# Patient Record
Sex: MALE | Race: BLACK OR AFRICAN AMERICAN | NOT HISPANIC OR LATINO
[De-identification: names, ages, dates, MRNs, and addresses within clinical notes are randomized per-mention and may not be internally consistent; named-entity substitution may affect disease eponyms.]

---

## 2021-10-08 ENCOUNTER — APPOINTMENT (OUTPATIENT)
Age: 61
End: 2021-10-08

## 2021-10-27 ENCOUNTER — INPATIENT (INPATIENT)
Facility: HOSPITAL | Age: 61
LOS: 6 days | Discharge: HOME | DRG: 194 | End: 2021-11-03
Attending: FAMILY MEDICINE | Admitting: INTERNAL MEDICINE
Payer: MEDICAID

## 2021-10-27 VITALS — WEIGHT: 188.05 LBS | HEIGHT: 72 IN

## 2021-10-27 DIAGNOSIS — I50.23 ACUTE ON CHRONIC SYSTOLIC (CONGESTIVE) HEART FAILURE: ICD-10-CM

## 2021-10-27 LAB
ALBUMIN SERPL ELPH-MCNC: 3.6 G/DL — SIGNIFICANT CHANGE UP (ref 3.3–5)
ALP SERPL-CCNC: 194 U/L — HIGH (ref 40–120)
ALT FLD-CCNC: 34 U/L — SIGNIFICANT CHANGE UP (ref 12–78)
AMPHET UR-MCNC: NEGATIVE — SIGNIFICANT CHANGE UP
ANION GAP SERPL CALC-SCNC: 3 MMOL/L — LOW (ref 5–17)
ANION GAP SERPL CALC-SCNC: 5 MMOL/L — SIGNIFICANT CHANGE UP (ref 5–17)
AST SERPL-CCNC: 39 U/L — HIGH (ref 15–37)
B PERT DNA SPEC QL NAA+PROBE: SIGNIFICANT CHANGE UP
BARBITURATES UR SCN-MCNC: NEGATIVE — SIGNIFICANT CHANGE UP
BASOPHILS # BLD AUTO: 0.04 K/UL — SIGNIFICANT CHANGE UP (ref 0–0.2)
BASOPHILS NFR BLD AUTO: 1.1 % — SIGNIFICANT CHANGE UP (ref 0–2)
BENZODIAZ UR-MCNC: NEGATIVE — SIGNIFICANT CHANGE UP
BILIRUB SERPL-MCNC: 1.3 MG/DL — HIGH (ref 0.2–1.2)
BUN SERPL-MCNC: 26 MG/DL — HIGH (ref 7–23)
BUN SERPL-MCNC: 30 MG/DL — HIGH (ref 7–23)
C PNEUM DNA SPEC QL NAA+PROBE: SIGNIFICANT CHANGE UP
CALCIUM SERPL-MCNC: 8.4 MG/DL — LOW (ref 8.5–10.1)
CALCIUM SERPL-MCNC: 8.7 MG/DL — SIGNIFICANT CHANGE UP (ref 8.5–10.1)
CHLORIDE SERPL-SCNC: 104 MMOL/L — SIGNIFICANT CHANGE UP (ref 96–108)
CHLORIDE SERPL-SCNC: 107 MMOL/L — SIGNIFICANT CHANGE UP (ref 96–108)
CO2 SERPL-SCNC: 29 MMOL/L — SIGNIFICANT CHANGE UP (ref 22–31)
CO2 SERPL-SCNC: 31 MMOL/L — SIGNIFICANT CHANGE UP (ref 22–31)
COCAINE METAB.OTHER UR-MCNC: NEGATIVE — SIGNIFICANT CHANGE UP
CREAT SERPL-MCNC: 1.31 MG/DL — HIGH (ref 0.5–1.3)
CREAT SERPL-MCNC: 1.32 MG/DL — HIGH (ref 0.5–1.3)
EOSINOPHIL # BLD AUTO: 0.06 K/UL — SIGNIFICANT CHANGE UP (ref 0–0.5)
EOSINOPHIL NFR BLD AUTO: 1.7 % — SIGNIFICANT CHANGE UP (ref 0–6)
FLUAV H1 2009 PAND RNA SPEC QL NAA+PROBE: SIGNIFICANT CHANGE UP
FLUAV H1 RNA SPEC QL NAA+PROBE: SIGNIFICANT CHANGE UP
FLUAV H3 RNA SPEC QL NAA+PROBE: SIGNIFICANT CHANGE UP
FLUAV SUBTYP SPEC NAA+PROBE: SIGNIFICANT CHANGE UP
FLUBV RNA SPEC QL NAA+PROBE: SIGNIFICANT CHANGE UP
GLUCOSE SERPL-MCNC: 105 MG/DL — HIGH (ref 70–99)
GLUCOSE SERPL-MCNC: 113 MG/DL — HIGH (ref 70–99)
HADV DNA SPEC QL NAA+PROBE: SIGNIFICANT CHANGE UP
HCOV PNL SPEC NAA+PROBE: SIGNIFICANT CHANGE UP
HCT VFR BLD CALC: 48.5 % — SIGNIFICANT CHANGE UP (ref 39–50)
HGB BLD-MCNC: 15.3 G/DL — SIGNIFICANT CHANGE UP (ref 13–17)
HMPV RNA SPEC QL NAA+PROBE: SIGNIFICANT CHANGE UP
HPIV1 RNA SPEC QL NAA+PROBE: SIGNIFICANT CHANGE UP
HPIV2 RNA SPEC QL NAA+PROBE: SIGNIFICANT CHANGE UP
HPIV3 RNA SPEC QL NAA+PROBE: SIGNIFICANT CHANGE UP
HPIV4 RNA SPEC QL NAA+PROBE: SIGNIFICANT CHANGE UP
IMM GRANULOCYTES NFR BLD AUTO: 0 % — SIGNIFICANT CHANGE UP (ref 0–1.5)
LYMPHOCYTES # BLD AUTO: 1.03 K/UL — SIGNIFICANT CHANGE UP (ref 1–3.3)
LYMPHOCYTES # BLD AUTO: 29.2 % — SIGNIFICANT CHANGE UP (ref 13–44)
MAGNESIUM SERPL-MCNC: 2.3 MG/DL — SIGNIFICANT CHANGE UP (ref 1.6–2.6)
MCHC RBC-ENTMCNC: 30.2 PG — SIGNIFICANT CHANGE UP (ref 27–34)
MCHC RBC-ENTMCNC: 31.5 GM/DL — LOW (ref 32–36)
MCV RBC AUTO: 95.8 FL — SIGNIFICANT CHANGE UP (ref 80–100)
METHADONE UR-MCNC: NEGATIVE — SIGNIFICANT CHANGE UP
MONOCYTES # BLD AUTO: 0.4 K/UL — SIGNIFICANT CHANGE UP (ref 0–0.9)
MONOCYTES NFR BLD AUTO: 11.3 % — SIGNIFICANT CHANGE UP (ref 2–14)
NEUTROPHILS # BLD AUTO: 2 K/UL — SIGNIFICANT CHANGE UP (ref 1.8–7.4)
NEUTROPHILS NFR BLD AUTO: 56.7 % — SIGNIFICANT CHANGE UP (ref 43–77)
NT-PROBNP SERPL-SCNC: HIGH PG/ML (ref 0–125)
OPIATES UR-MCNC: NEGATIVE — SIGNIFICANT CHANGE UP
PCP SPEC-MCNC: SIGNIFICANT CHANGE UP
PCP UR-MCNC: NEGATIVE — SIGNIFICANT CHANGE UP
PHOSPHATE SERPL-MCNC: 3.4 MG/DL — SIGNIFICANT CHANGE UP (ref 2.5–4.5)
PLATELET # BLD AUTO: 168 K/UL — SIGNIFICANT CHANGE UP (ref 150–400)
POTASSIUM SERPL-MCNC: 4.2 MMOL/L — SIGNIFICANT CHANGE UP (ref 3.5–5.3)
POTASSIUM SERPL-MCNC: 4.3 MMOL/L — SIGNIFICANT CHANGE UP (ref 3.5–5.3)
POTASSIUM SERPL-SCNC: 4.2 MMOL/L — SIGNIFICANT CHANGE UP (ref 3.5–5.3)
POTASSIUM SERPL-SCNC: 4.3 MMOL/L — SIGNIFICANT CHANGE UP (ref 3.5–5.3)
PROT SERPL-MCNC: 6.9 GM/DL — SIGNIFICANT CHANGE UP (ref 6–8.3)
RAPID RVP RESULT: SIGNIFICANT CHANGE UP
RBC # BLD: 5.06 M/UL — SIGNIFICANT CHANGE UP (ref 4.2–5.8)
RBC # FLD: 16.3 % — HIGH (ref 10.3–14.5)
RSV RNA SPEC QL NAA+PROBE: SIGNIFICANT CHANGE UP
RV+EV RNA SPEC QL NAA+PROBE: SIGNIFICANT CHANGE UP
SARS-COV-2 RNA SPEC QL NAA+PROBE: SIGNIFICANT CHANGE UP
SODIUM SERPL-SCNC: 138 MMOL/L — SIGNIFICANT CHANGE UP (ref 135–145)
SODIUM SERPL-SCNC: 141 MMOL/L — SIGNIFICANT CHANGE UP (ref 135–145)
THC UR QL: POSITIVE — SIGNIFICANT CHANGE UP
TROPONIN I, HIGH SENSITIVITY RESULT: 52.52 NG/L — SIGNIFICANT CHANGE UP
WBC # BLD: 3.53 K/UL — LOW (ref 3.8–10.5)
WBC # FLD AUTO: 3.53 K/UL — LOW (ref 3.8–10.5)

## 2021-10-27 PROCEDURE — 93005 ELECTROCARDIOGRAM TRACING: CPT

## 2021-10-27 PROCEDURE — 83735 ASSAY OF MAGNESIUM: CPT

## 2021-10-27 PROCEDURE — 83880 ASSAY OF NATRIURETIC PEPTIDE: CPT

## 2021-10-27 PROCEDURE — 80048 BASIC METABOLIC PNL TOTAL CA: CPT

## 2021-10-27 PROCEDURE — 86769 SARS-COV-2 COVID-19 ANTIBODY: CPT

## 2021-10-27 PROCEDURE — 80061 LIPID PANEL: CPT

## 2021-10-27 PROCEDURE — 80053 COMPREHEN METABOLIC PANEL: CPT

## 2021-10-27 PROCEDURE — 85027 COMPLETE CBC AUTOMATED: CPT

## 2021-10-27 PROCEDURE — U0003: CPT

## 2021-10-27 PROCEDURE — 84100 ASSAY OF PHOSPHORUS: CPT

## 2021-10-27 PROCEDURE — 99285 EMERGENCY DEPT VISIT HI MDM: CPT

## 2021-10-27 PROCEDURE — 99223 1ST HOSP IP/OBS HIGH 75: CPT

## 2021-10-27 PROCEDURE — U0005: CPT

## 2021-10-27 PROCEDURE — 93010 ELECTROCARDIOGRAM REPORT: CPT | Mod: 76

## 2021-10-27 PROCEDURE — 93306 TTE W/DOPPLER COMPLETE: CPT

## 2021-10-27 PROCEDURE — 80307 DRUG TEST PRSMV CHEM ANLYZR: CPT

## 2021-10-27 PROCEDURE — 36415 COLL VENOUS BLD VENIPUNCTURE: CPT

## 2021-10-27 PROCEDURE — 83036 HEMOGLOBIN GLYCOSYLATED A1C: CPT

## 2021-10-27 PROCEDURE — 71045 X-RAY EXAM CHEST 1 VIEW: CPT | Mod: 26

## 2021-10-27 RX ORDER — FUROSEMIDE 40 MG
40 TABLET ORAL DAILY
Refills: 0 | Status: DISCONTINUED | OUTPATIENT
Start: 2021-10-27 | End: 2021-10-28

## 2021-10-27 RX ORDER — CARVEDILOL PHOSPHATE 80 MG/1
3.12 CAPSULE, EXTENDED RELEASE ORAL EVERY 12 HOURS
Refills: 0 | Status: DISCONTINUED | OUTPATIENT
Start: 2021-10-27 | End: 2021-10-29

## 2021-10-27 RX ORDER — FUROSEMIDE 40 MG
80 TABLET ORAL ONCE
Refills: 0 | Status: COMPLETED | OUTPATIENT
Start: 2021-10-27 | End: 2021-10-27

## 2021-10-27 RX ORDER — HEPARIN SODIUM 5000 [USP'U]/ML
5000 INJECTION INTRAVENOUS; SUBCUTANEOUS EVERY 12 HOURS
Refills: 0 | Status: DISCONTINUED | OUTPATIENT
Start: 2021-10-27 | End: 2021-11-03

## 2021-10-27 RX ORDER — ASPIRIN/CALCIUM CARB/MAGNESIUM 324 MG
162 TABLET ORAL ONCE
Refills: 0 | Status: COMPLETED | OUTPATIENT
Start: 2021-10-27 | End: 2021-10-27

## 2021-10-27 RX ORDER — SENNA PLUS 8.6 MG/1
2 TABLET ORAL AT BEDTIME
Refills: 0 | Status: DISCONTINUED | OUTPATIENT
Start: 2021-10-27 | End: 2021-11-03

## 2021-10-27 RX ORDER — NICOTINE POLACRILEX 2 MG
1 GUM BUCCAL DAILY
Refills: 0 | Status: DISCONTINUED | OUTPATIENT
Start: 2021-10-27 | End: 2021-11-03

## 2021-10-27 RX ORDER — ASPIRIN/CALCIUM CARB/MAGNESIUM 324 MG
81 TABLET ORAL DAILY
Refills: 0 | Status: DISCONTINUED | OUTPATIENT
Start: 2021-10-27 | End: 2021-11-03

## 2021-10-27 RX ADMIN — Medication 1 PATCH: at 11:44

## 2021-10-27 RX ADMIN — CARVEDILOL PHOSPHATE 3.12 MILLIGRAM(S): 80 CAPSULE, EXTENDED RELEASE ORAL at 16:17

## 2021-10-27 RX ADMIN — Medication 162 MILLIGRAM(S): at 11:44

## 2021-10-27 RX ADMIN — Medication 80 MILLIGRAM(S): at 11:44

## 2021-10-27 NOTE — H&P ADULT - HISTORY OF PRESENT ILLNESS
62 y/o male with a PMHx of CAD, Nonischemic Cardiomyopathy - CHF (EF in 2016 15-20%), no stents per prior caths noted, HTN, MI presents to the ED c/o worsening SOB since yesterday. Pt unable to walk short distances without feeling SOB. Pt lives at the shelter where they have a high sodium diet. No other complaints at this time. 60 y/o male with a PMHx of CAD, Nonischemic Cardiomyopathy - CHF (EF in 2016 15-20%), no stents per prior caths noted, HTN, MI presents to the ED c/o worsening SOB. Pt unable to walk short distances without feeling SOB, +orthopnea, +productive cough. Pt lives at the shelter where they have a high sodium diet. No chest pain, palp, fever, Gi issues, urinary issues.  Cardio - [facility] Crissy Rosas

## 2021-10-27 NOTE — ED STATDOCS - NSICDXPASTMEDICALHX_GEN_ALL_CORE_FT
PAST MEDICAL HISTORY:  CAD (coronary artery disease)     Heart failure, systolic     Hypertension

## 2021-10-27 NOTE — ED ADULT NURSE REASSESSMENT NOTE - NS ED NURSE REASSESS COMMENT FT1
Pt had 1 episode of svt, hr into 160's. Pt endorsed chest discomfort at time. EKG performed which shows pt went back into normal sinus rhythm. Pt reports chest discomfort has subsided. Dr. Stevens, night time senior made aware. No additional orders at this time.

## 2021-10-27 NOTE — H&P ADULT - NSHPLABSRESULTS_GEN_ALL_CORE
LABS: All Labs Reviewed:                        15.3   3.53  )-----------( 168      ( 27 Oct 2021 11:07 )             48.5     10-27    141  |  107  |  26<H>  ----------------------------<  105<H>  4.3   |  29  |  1.31<H>    Ca    8.7      27 Oct 2021 11:07    TPro  6.9  /  Alb  3.6  /  TBili  1.3<H>  /  DBili  x   /  AST  39<H>  /  ALT  34  /  AlkPhos  194<H>  10-27    Serum Pro-Brain Natriuretic Peptide: 72174 pg/mL (10.27.21 @ 11:07)     RADIOLOGY:  CXR pending    EKG:    TELEMETRY REVIEW: LABS: All Labs Reviewed:                        15.3   3.53  )-----------( 168      ( 27 Oct 2021 11:07 )             48.5     10-27    141  |  107  |  26<H>  ----------------------------<  105<H>  4.3   |  29  |  1.31<H>    Ca    8.7      27 Oct 2021 11:07    TPro  6.9  /  Alb  3.6  /  TBili  1.3<H>  /  DBili  x   /  AST  39<H>  /  ALT  34  /  AlkPhos  194<H>  10-27    Serum Pro-Brain Natriuretic Peptide: 56751 pg/mL (10.27.21 @ 11:07)     RADIOLOGY:  CXR pending    EKG: N/A, will order for AM    TELEMETRY REVIEW: NSR, no events

## 2021-10-27 NOTE — ED PROVIDER NOTE - CHPI ED SYMPTOMS POS
----- Message from Renny Bear MD sent at 1/18/2017  1:01 PM CST -----  Looks ok so far  
Patient called in regards to the messages that were submitted over the weekend.  Patient feels that he needs to have a catheter put in due to issues urinating.  Patient would like to have Bernie, to have a catheter put in.    Please contact the patient in regards to this.    Patient stated that he was going to be headed the the Emergency Room due to unable to urinate & constipation.    Patient would prefer to have Dr Medrano nurse put in the catheter.      
Pt notified of final ua results and recommendations, to finish antibx from [facility], then start cipro as prescribed prior to cysto, bx. Pt aware.  
Spoke with pt- dawna to come in for catheter placement, \"unable to go\". Will make MD aware.  
SHORTNESS OF BREATH

## 2021-10-27 NOTE — H&P ADULT - NSHPPHYSICALEXAM_GEN_ALL_CORE
PHYSICAL EXAM:  Constitutional: NAD, awake and alert  HEENT: PERR, EOMI, Normal Hearing, MMM  Neck: Soft and supple, No LAD, No JVD  Respiratory: Breath sounds are clear bilaterally, No wheezing, rales or rhonchi  Cardiovascular: S1 and S2, regular rate and rhythm, no Murmurs, gallops or rubs  Gastrointestinal: Bowel Sounds present, soft, nontender, nondistended, no guarding, no rebound  Extremities: No peripheral edema  Vascular: 2+ peripheral pulses  Neurological: A/O x 3, no focal deficits  Musculoskeletal: 5/5 strength b/l upper and lower extremities  Skin: No rashes    Vital Signs Last 24 Hrs  T(C): --  T(F): --  HR: 112 (27 Oct 2021 10:36) (112 - 112)  BP: 118/87 (27 Oct 2021 10:36) (118/87 - 118/87)  BP(mean): 97 (27 Oct 2021 10:36) (97 - 97)  RR: 18 (27 Oct 2021 10:36) (18 - 18)  SpO2: 100% (27 Oct 2021 10:36) (100% - 100%) PHYSICAL EXAM:  Constitutional: Awake and alert  HEENT: PERR, EOMI, Normal Hearing, MMM  Neck: Soft and supple, No LAD, No JVD  Respiratory: Breath sounds are slight crackles b/l  Cardiovascular: S1 and S2, regular rate and rhythm, no Murmurs, gallops or rubs. ICD  Gastrointestinal: Bowel Sounds present, soft, nontender, nondistended, no guarding, no rebound  Extremities: No peripheral edema  Vascular: 2+ peripheral pulses  Neurological: A/O x 3, no focal deficits  Musculoskeletal: 5/5 strength b/l upper and lower extremities  Skin: No rashes    Vital Signs Last 24 Hrs  T(C): --  T(F): --  HR: 112 (27 Oct 2021 10:36) (112 - 112)  BP: 118/87 (27 Oct 2021 10:36) (118/87 - 118/87)  BP(mean): 97 (27 Oct 2021 10:36) (97 - 97)  RR: 18 (27 Oct 2021 10:36) (18 - 18)  SpO2: 100% (27 Oct 2021 10:36) (100% - 100%) ROOM AIR

## 2021-10-27 NOTE — ED ADULT NURSE REASSESSMENT NOTE - NS ED NURSE REASSESS COMMENT FT1
Pt with 4 min run of SVT, pt broke on his own.  Currently NSR in the 90's.  Pt with no complaints at this time.  Repeat EKG completed.  Night Senior Rodney made aware.  No new orders at this time. Cyclophosphamide Pregnancy And Lactation Text: This medication is Pregnancy Category D and it isn't considered safe during pregnancy. This medication is excreted in breast milk.

## 2021-10-27 NOTE — ED PROVIDER NOTE - PROGRESS NOTE DETAILS
Saran Hernandez for attending Dr. Aburto: Rectal Exam: Lot [id number]. Expiration 2/28/22. Brown stool. Guaiac negative. QC passed. Criselda DURAN: patient to be admitted for chf; endorsed to Dr. Winters

## 2021-10-27 NOTE — ED PROVIDER NOTE - OBJECTIVE STATEMENT
62 y/o male with a PMHx of CAD, CHF, heart failure, HTN, MI  presents to the ED c/o worsening SOB since yesterday. Pt unable to walk short distances without feeling SOB. Pt lives at the shelter where they have a high sodium diet. No other complaints at this time.

## 2021-10-27 NOTE — ED ADULT NURSE REASSESSMENT NOTE - NS ED NURSE REASSESS COMMENT FT1
Received pt from prior RN. Pt resting comfortably. Pt's lungs diminished at bases but sat on RA 95%. Pt has been voiding large amts after lasix, 1000ml emptied from urinal. Pt resp unlabored. Monitored brandy. Call bell in reach.

## 2021-10-27 NOTE — ED STATDOCS - PROGRESS NOTE DETAILS
Saran MARTINEZ for Dr. Odell   60 y/o male with PMHx of HTN, CAD, CHF, presents to the ED c/o LE swelling, increased SOB, increased abdominal distension. Pt lives at a shelter. Received first Moderna vaccine 2 weeks. has been having diarrhea x2 days and worsening cough. No fever. Sat low 90s, Will send pt to main ED for further evaluation.

## 2021-10-27 NOTE — H&P ADULT - NSICDXPASTMEDICALHX_GEN_ALL_CORE_FT
PAST MEDICAL HISTORY:  CAD (coronary artery disease)     Heart failure, systolic     Hypertension     Nonischemic cardiomyopathy

## 2021-10-27 NOTE — H&P ADULT - NSHPSOCIALHISTORY_GEN_ALL_CORE
Living Situation: Shelter  Tobacco Use  Alcohol Use  Drug Use    COVID VACCINE > Living Situation: Shelter in [place]  Tobacco Use: Cigarettes, 3 per days x 35 years  Alcohol Use: occasionally  Drug Use: THC occasionally    COVID VACCINE > Moderna x1, will get second dose in 2 weeks

## 2021-10-27 NOTE — ED ADULT NURSE NOTE - PRIMARY CARE PROVIDER
Strong peripheral pulses/Capillary refill less/equal to 2 seconds/No visible significant external bleeding
see md note

## 2021-10-27 NOTE — H&P ADULT - ASSESSMENT
60 y/o male with a PMHx of CAD, Nonischemic Cardiomyopathy - CHF (EF in 2016 15-20%), no stents per prior caths noted, HTN, MI presents to the ED c/o worsening SOB. Admitted for:    #Acute hypoxic respiratory failure due to acute on chronic HFrEF exacerbation  #Hx Nonischemic Cardiomyopathy  Last known EF from 2016 (25-20%)  Supplemental 02 - wean as tolerated  Cont. IV Lasix - on PO torsemide outpatient  Cont. BB  Hold ARB for now given elevated scr  Daily weights, low sodium diet. FR 1500mL  F/u repeat echo  Nutrition eval for HF education  Cardio consult     #HTN  Cont. BB  Hold ARB for now given elevated scr    #DVT ppx  SQ Heparin    Dispo: Monitor on tele. Cardio eval. IV diuresis for HF exacerbation   60 y/o male with a PMHx of CAD, Nonischemic Cardiomyopathy - CHF (EF in 2016 15-20%), no stents per prior caths noted, HTN, MI presents to the ED c/o worsening SOB. Admitted for:    #Acute hypoxic respiratory failure due to acute on chronic HFrEF exacerbation  #Hx Nonischemic Cardiomyopathy  Last known EF from 2016 (25-20%)  Supplemental 02 - wean as tolerated  Cont. IV Lasix - on PO torsemide outpatient  Cont. BB  Hold ARB for now given elevated scr  Daily weights, low sodium diet. FR 1500mL  F/u repeat echo  Nutrition eval for HF education  Cardio consult: Lance    #HTN  Cont. BB  Hold ARB for now given elevated scr    #DVT ppx  SQ Heparin    GOC/ ADVANCED DIRECTIVES: DNR/DNI, MOLST    Dispo: Monitor on tele. Cardio eval. IV diuresis for HF exacerbation   60 y/o male with a PMHx of CAD, Nonischemic Cardiomyopathy - CHF (EF in 2016 15-20%), no stents per prior caths noted, HTN, MI presents to the ED c/o worsening SOB. Admitted for:    #Acute hypoxic respiratory failure due to acute on chronic HFrEF exacerbation  #Hx Nonischemic Cardiomyopathy  Last known EF from 2016 (25-20%)  Supplemental 02 - wean as tolerated  Cont. IV Lasix - on PO torsemide outpatient  Cont. BB  Hold ARB for now given elevated scr  Daily weights, low sodium diet. FR 1500mL  F/u repeat echo  Nutrition eval for HF education  Cardio consult: Lance    #HTN  Cont. BB  Hold ARB for now given elevated scr    #Nicotine Dependence  Cessation encouraged, nicotine patch     #DVT ppx  SQ Heparin    GOC/ ADVANCED DIRECTIVES: DNR/DNI, MOLST    Dispo: Monitor on tele. Cardio eval. IV diuresis for HF exacerbation   62 y/o male with a PMHx of CAD, Nonischemic Cardiomyopathy - CHF (EF in 2016 15-20%), no stents per prior caths noted, HTN, MI presents to the ED c/o worsening SOB. Admitted for:    #Acute hypoxic respiratory failure due to acute on chronic HFrEF exacerbation  #Hx Nonischemic Cardiomyopathy  Last known EF from 2016 (25-20%)  Supplemental 02 - wean as tolerated  Cont. IV Lasix - on PO torsemide outpatient  Cont. BB  Hold ARB for now given elevated scr  Daily weights, low sodium diet. FR 1500mL  F/u repeat echo  Nutrition eval for HF education  Cardio consult: Lance    #HTN  Cont. BB  Hold ARB for now given elevated scr    #Nicotine Dependence  Cessation encouraged, nicotine patch   F/U UTOX    #DVT ppx  SQ Heparin    GOC/ ADVANCED DIRECTIVES: DNR/DNI, MOLST    Dispo: Monitor on tele. Cardio eval. IV diuresis for HF exacerbation   60 y/o male with a PMHx of CAD, Nonischemic Cardiomyopathy - CHF (EF in 2016 15-20%), no stents per prior caths noted, HTN, MI presents to the ED c/o worsening SOB. Admitted for:    #Acute hypoxic respiratory failure due to acute on chronic HFrEF exacerbation  #Hx Nonischemic Cardiomyopathy  Last known EF from 2016 (25-20%)  Supplemental 02 - wean as tolerated  Cont. IV Lasix - on PO torsemide outpatient  Cont. BB  Hold ARB for now given elevated scr  Daily weights, low sodium diet. FR 1500mL  F/u repeat echo  Nutrition eval for HF education  Cardio consult: Lance    #DERRICK- potentially cardiorenal   -trend Cr w diuresis  -hold ARB    #HTN  Cont. BB  Hold ARB for now given elevated scr    #Nicotine Dependence  Cessation encouraged, nicotine patch   F/U UTOX    #DVT ppx  SQ Heparin    GOC/ ADVANCED DIRECTIVES: DNR/DNI, MOLST    Dispo: Monitor on tele. Cardio eval. IV diuresis for HF exacerbation

## 2021-10-28 PROBLEM — Z00.00 ENCOUNTER FOR PREVENTIVE HEALTH EXAMINATION: Status: ACTIVE | Noted: 2021-10-28

## 2021-10-28 LAB
A1C WITH ESTIMATED AVERAGE GLUCOSE RESULT: 6.2 % — HIGH (ref 4–5.6)
ANION GAP SERPL CALC-SCNC: 5 MMOL/L — SIGNIFICANT CHANGE UP (ref 5–17)
BUN SERPL-MCNC: 28 MG/DL — HIGH (ref 7–23)
CALCIUM SERPL-MCNC: 8.4 MG/DL — LOW (ref 8.5–10.1)
CHLORIDE SERPL-SCNC: 107 MMOL/L — SIGNIFICANT CHANGE UP (ref 96–108)
CHOLEST SERPL-MCNC: 104 MG/DL — SIGNIFICANT CHANGE UP
CO2 SERPL-SCNC: 28 MMOL/L — SIGNIFICANT CHANGE UP (ref 22–31)
COVID-19 SPIKE DOMAIN AB INTERP: POSITIVE
COVID-19 SPIKE DOMAIN ANTIBODY RESULT: 2.49 U/ML — HIGH
CREAT SERPL-MCNC: 1.2 MG/DL — SIGNIFICANT CHANGE UP (ref 0.5–1.3)
ESTIMATED AVERAGE GLUCOSE: 131 MG/DL — HIGH (ref 68–114)
GLUCOSE SERPL-MCNC: 90 MG/DL — SIGNIFICANT CHANGE UP (ref 70–99)
HCT VFR BLD CALC: 43.4 % — SIGNIFICANT CHANGE UP (ref 39–50)
HDLC SERPL-MCNC: 23 MG/DL — LOW
HGB BLD-MCNC: 14.1 G/DL — SIGNIFICANT CHANGE UP (ref 13–17)
LIPID PNL WITH DIRECT LDL SERPL: 71 MG/DL — SIGNIFICANT CHANGE UP
MAGNESIUM SERPL-MCNC: 2.3 MG/DL — SIGNIFICANT CHANGE UP (ref 1.6–2.6)
MCHC RBC-ENTMCNC: 30.3 PG — SIGNIFICANT CHANGE UP (ref 27–34)
MCHC RBC-ENTMCNC: 32.5 GM/DL — SIGNIFICANT CHANGE UP (ref 32–36)
MCV RBC AUTO: 93.3 FL — SIGNIFICANT CHANGE UP (ref 80–100)
NON HDL CHOLESTEROL: 82 MG/DL — SIGNIFICANT CHANGE UP
PLATELET # BLD AUTO: 148 K/UL — LOW (ref 150–400)
POTASSIUM SERPL-MCNC: 4.3 MMOL/L — SIGNIFICANT CHANGE UP (ref 3.5–5.3)
POTASSIUM SERPL-SCNC: 4.3 MMOL/L — SIGNIFICANT CHANGE UP (ref 3.5–5.3)
RBC # BLD: 4.65 M/UL — SIGNIFICANT CHANGE UP (ref 4.2–5.8)
RBC # FLD: 16 % — HIGH (ref 10.3–14.5)
SARS-COV-2 IGG+IGM SERPL QL IA: 2.49 U/ML — HIGH
SARS-COV-2 IGG+IGM SERPL QL IA: POSITIVE
SODIUM SERPL-SCNC: 140 MMOL/L — SIGNIFICANT CHANGE UP (ref 135–145)
TRIGL SERPL-MCNC: 55 MG/DL — SIGNIFICANT CHANGE UP
WBC # BLD: 3.78 K/UL — LOW (ref 3.8–10.5)
WBC # FLD AUTO: 3.78 K/UL — LOW (ref 3.8–10.5)

## 2021-10-28 PROCEDURE — 93010 ELECTROCARDIOGRAM REPORT: CPT

## 2021-10-28 PROCEDURE — 99233 SBSQ HOSP IP/OBS HIGH 50: CPT

## 2021-10-28 PROCEDURE — 93306 TTE W/DOPPLER COMPLETE: CPT | Mod: 26

## 2021-10-28 RX ORDER — LANOLIN ALCOHOL/MO/W.PET/CERES
5 CREAM (GRAM) TOPICAL AT BEDTIME
Refills: 0 | Status: DISCONTINUED | OUTPATIENT
Start: 2021-10-28 | End: 2021-11-03

## 2021-10-28 RX ORDER — FUROSEMIDE 40 MG
40 TABLET ORAL
Refills: 0 | Status: DISCONTINUED | OUTPATIENT
Start: 2021-10-28 | End: 2021-10-31

## 2021-10-28 RX ORDER — LOSARTAN POTASSIUM 100 MG/1
25 TABLET, FILM COATED ORAL DAILY
Refills: 0 | Status: DISCONTINUED | OUTPATIENT
Start: 2021-10-28 | End: 2021-10-28

## 2021-10-28 RX ORDER — ATORVASTATIN CALCIUM 80 MG/1
40 TABLET, FILM COATED ORAL AT BEDTIME
Refills: 0 | Status: DISCONTINUED | OUTPATIENT
Start: 2021-10-28 | End: 2021-11-03

## 2021-10-28 RX ORDER — SACUBITRIL AND VALSARTAN 24; 26 MG/1; MG/1
1 TABLET, FILM COATED ORAL
Refills: 0 | Status: DISCONTINUED | OUTPATIENT
Start: 2021-10-29 | End: 2021-11-03

## 2021-10-28 RX ORDER — LANOLIN ALCOHOL/MO/W.PET/CERES
5 CREAM (GRAM) TOPICAL AT BEDTIME
Refills: 0 | Status: COMPLETED | OUTPATIENT
Start: 2021-10-28 | End: 2021-10-28

## 2021-10-28 RX ORDER — SACUBITRIL AND VALSARTAN 24; 26 MG/1; MG/1
1 TABLET, FILM COATED ORAL
Qty: 60 | Refills: 0
Start: 2021-10-28 | End: 2021-11-26

## 2021-10-28 RX ADMIN — LOSARTAN POTASSIUM 25 MILLIGRAM(S): 100 TABLET, FILM COATED ORAL at 10:51

## 2021-10-28 RX ADMIN — Medication 1 PATCH: at 07:00

## 2021-10-28 RX ADMIN — Medication 81 MILLIGRAM(S): at 10:50

## 2021-10-28 RX ADMIN — CARVEDILOL PHOSPHATE 3.12 MILLIGRAM(S): 80 CAPSULE, EXTENDED RELEASE ORAL at 05:40

## 2021-10-28 RX ADMIN — ATORVASTATIN CALCIUM 40 MILLIGRAM(S): 80 TABLET, FILM COATED ORAL at 21:04

## 2021-10-28 RX ADMIN — Medication 40 MILLIGRAM(S): at 10:50

## 2021-10-28 RX ADMIN — Medication 5 MILLIGRAM(S): at 01:25

## 2021-10-28 RX ADMIN — CARVEDILOL PHOSPHATE 3.12 MILLIGRAM(S): 80 CAPSULE, EXTENDED RELEASE ORAL at 17:52

## 2021-10-28 NOTE — PHARMACOTHERAPY INTERVENTION NOTE - COMMENTS
Medication history complete, [facility]
As per [facility] copay for one month supply of Entresto is $3

## 2021-10-28 NOTE — DIETITIAN INITIAL EVALUATION ADULT. - MALNUTRITION
Meets criteria for mild protein-calorie malnutrition in the context of social and environmental circumstance.

## 2021-10-28 NOTE — DIETITIAN INITIAL EVALUATION ADULT. - PERTINENT MEDS FT
MEDICATIONS  (STANDING):  aspirin enteric coated 81 milliGRAM(s) Oral daily  carvedilol 3.125 milliGRAM(s) Oral every 12 hours  furosemide   Injectable 40 milliGRAM(s) IV Push daily  heparin   Injectable 5000 Unit(s) SubCutaneous every 12 hours  nicotine - 21 mG/24Hr(s) Patch 1 patch Transdermal daily    MEDICATIONS  (PRN):  senna 2 Tablet(s) Oral at bedtime PRN Constipation

## 2021-10-28 NOTE — PROGRESS NOTE ADULT - ASSESSMENT
61M hx  CAD, prior MI, NICM w HFrEF (15%), s/p AICD, HTN, p/w worsening SOB, found to have acute on chronic HFrEF in setting of dietary indiscretion.    #Acute on chronic HFrEF, hx NICM  -reports diet at shelter is high in salt, pt has limited options for fruit/veg. Alot of cold cut sandwiches  -tele  -on RA at rest, should ambulate for pulsox on RA  -trend daily weight, i/o  -BNP 15K on admission, repeat tmrw  -lipids checked  -A1c 6.2 (pre DM)  -CXR and echo as above, should review echo w cards  -AICD interrogation as above  -on torsemide 20BID at home, will give lasix 40iv BID here  -continue home BB, coreg  -will change losartan to entresto to optimize CHF management, cost run $3    #DERRICK   -Cr improving, ok to resume Losartan today and will change to Entresto tmrw    #thrombocytopenia- borderline, trend    #CAD, hx MI, HTN  -continue BB  -start entresto  -continue ASA  -start statin    #nicotine dependence  -refusing patch    #DVT ppx- SQH    #DNR/DNI, MOSLT done    #from shelter, return after adequate diuresis

## 2021-10-28 NOTE — DIETITIAN NUTRITION RISK NOTIFICATION - TREATMENT: THE FOLLOWING DIET HAS BEEN RECOMMENDED
Diet, DASH/TLC:   Sodium & Cholesterol Restricted  1500mL Fluid Restriction ([id number])  Supplement Feeding Modality:  Oral  Ensure Enlive Cans or Servings Per Day:  1       Frequency:  Two Times a day (10-27-21 @ 14:47) [Active]

## 2021-10-28 NOTE — PROGRESS NOTE ADULT - SUBJECTIVE AND OBJECTIVE BOX
HOSPITALIST ATTENDING PROGRESS NOTE    Chart and meds reviewed.  Patient seen and examined.    CC: SOB    Subjective: Reports urinated alot yesterday but not as much today. Reports breathing improving, reports hard to maintain low salt diet at shelter, never being given a choice of fruit or veg    All other systems reviewed and found to be negative with the exception of what has been described above.    MEDICATIONS  (STANDING):  aspirin enteric coated 81 milliGRAM(s) Oral daily  atorvastatin 40 milliGRAM(s) Oral at bedtime  carvedilol 3.125 milliGRAM(s) Oral every 12 hours  furosemide   Injectable 40 milliGRAM(s) IV Push two times a day  heparin   Injectable 5000 Unit(s) SubCutaneous every 12 hours  nicotine - 21 mG/24Hr(s) Patch 1 patch Transdermal daily    MEDICATIONS  (PRN):  melatonin 5 milliGRAM(s) Oral at bedtime PRN Insomnia  senna 2 Tablet(s) Oral at bedtime PRN Constipation      VITALS:  T(F): 99.1 (10-28-21 @ 19:52), Max: 99.1 (10-28-21 @ 19:52)  HR: 85 (10-28-21 @ 19:52) (85 - 97)  BP: 111/73 (10-28-21 @ 19:52) (111/73 - 115/85)  RR: 18 (10-28-21 @ 19:52) (17 - 18)  SpO2: 97% (10-28-21 @ 19:52) (91% - 97%)  Wt(kg): --    I&O's Summary    28 Oct 2021 07:01  -  28 Oct 2021 20:59  --------------------------------------------------------  IN: 924 mL / OUT: 500 mL / NET: 424 mL        CAPILLARY BLOOD GLUCOSE          PHYSICAL EXAM:  Gen: NAD  HEENT:  pupils equal and reactive, EOMI, no oropharyngeal lesions, erythema, exudates, oral thrush  NECK:   supple, no carotid bruits, no palpable lymph nodes, no thyromegaly  CV:  +S1, +S2, regular, no murmurs or rubs  RESP:   lungs clear to auscultation bilaterally, no wheezing, rales, rhonchi, good air entry bilaterally, + bibasilar crackles  BREAST:  not examined  GI:  abdomen soft, non-tender, non-distended, normal BS, no bruits, no abdominal masses, no palpable masses  RECTAL:  not examined  :  not examined  MSK:   normal muscle tone, no atrophy, no rigidity, no contractions  EXT:  no clubbing, no cyanosis, no edema, no calf pain, swelling or erythema  VASCULAR:  pulses equal and symmetric in the upper and lower extremities  NEURO:  AAOX3, no focal neurological deficits, follows all commands, able to move extremities spontaneously  SKIN:  no ulcers, lesions or rashes    LABS:                            14.1   3.78  )-----------( 148      ( 28 Oct 2021 06:58 )             43.4     10-28    140  |  107  |  28<H>  ----------------------------<  90  4.3   |  28  |  1.20    Ca    8.4<L>      28 Oct 2021 06:58  Phos  3.4     10-27  Mg     2.3     10-28    TPro  6.9  /  Alb  3.6  /  TBili  1.3<H>  /  DBili  x   /  AST  39<H>  /  ALT  34  /  AlkPhos  194<H>  10-27        LIVER FUNCTIONS - ( 27 Oct 2021 11:07 )  Alb: 3.6 g/dL / Pro: 6.9 gm/dL / ALK PHOS: 194 U/L / ALT: 34 U/L / AST: 39 U/L / GGT: x           CULTURES:  no new    Additional results/Imaging, I have personally reviewed:  CXR 10/27/21: Heart enlargement defibrillator. Right base pleuropulmonary reaction.    Echo 10/28/21: The left ventricle cavity is severely dilated. Left ventricle systolic function appears severely and globally impaired; segmental wall motion abnormalities noted. Estimated EjectionFraction is 16 %. The left atrium is mildly dilated. The right atrium appears moderately dilated. A device wire is seen in the RV and RA. Normal appearing right ventricle structure and function. A device wire is seen in the RV and RA. Fibrocalcific changes noted to the Aortic valve leaflets with preserved leaflet excursion. Fibrocalcific changes noted to the mitral valve leaflets with preserved leaflet excursion. EA reversal of the mitral inflow consistent with reduced compliance of the left ventricle. Moderate to severe (3+) mitral regurgitation is present. Normal appearing tricuspid valve structure. Moderate to severe (3+) tricuspid valve regurgitation is present. Moderate pulmonary hypertension. No evidence of pericardial effusion. IVC is dilated and not collapsing with inspiration.    AICD interrogation 10/28/21:  Episodes: episodes of NSVT, most recent on 10/19/21, no ICD therapies delivered.   Changes made: None  Conclusion: Normal device function.    Telemetry, personally reviewed:  10/28/21: sinus 90-110s, APCs

## 2021-10-28 NOTE — DIETITIAN INITIAL EVALUATION ADULT. - OTHER INFO
60 y/o male with a PMHx of CAD, Nonischemic Cardiomyopathy - CHF (EF in 2016 15-20%), no stents per prior caths noted, HTN, MI presents to the ED c/o worsening SOB. Pt unable to walk short distances without feeling SOB, +orthopnea, +productive cough. Pt lives at the shelter where they have a high sodium diet. No chest pain, palp, fever, Gi issues, urinary issues.  Cardio - [facility] [place]     Pt seen for assessment and education. Pt in difficult situation 2/2 to living in shelter and unable to get food that meets therapeutic diet needs. Pt states a lot of the food at the shelter is high in sodium. Pt has no cold storage and only a microwave. Pt overall knows he needs to avoid high sodium foods, but is often provided with deli meats. Pt also noted as thin and shows some mild signs of muscle and fat wasting. Pt with possible weight loss as pt states he avoids a lot of food and tries to be mindful about fluid intake. Pt denies c/s difficulty and denied n/v/d/c. Pt does not eat pork, no food allergies. Pt provided support for challenging social/economic situation. Recommend Ensure Enlive Daily to help meet protein needs. Will continue to monitor pt

## 2021-10-28 NOTE — CONSULT NOTE ADULT - ASSESSMENT
62 y/o male with a PMHx of CAD, Nonischemic Cardiomyopathy - CHF (EF in 2016 15-20%) s/p ICD (reports placed about 5 months ago by Dr. Shaikh), nonobstructive CAD, HTN presents to the ED c/o worsening SOB 2/2 acute on chronic systolic congestive heart failure exacerbation 2/2 dietary noncompliance    -Patient struggles with dietary compliance since at his shelter they only service high salt foods such as deli meats. ?if able to get him low salt foods, may need  to help get him a better diet so he does not get re-admitted due to his diet.  -Symptoms improving but still hypervolemic.  -At home on torsemide 20mg BID which is equivalent to about lasix PO 40mg BID or lasix 40 IV Qday, therefore not on higher dose than at home. Would monitor strict I/O and daily weights with goal to be net negative 1.5-2 liters in the next 24 hours, will likely need BID dosing of his lasix if he is not meeting this goal.  -On losartan, ?if he is able to afford entresto. If he is would switch him to entresto 24-26 BID, which is a better HF medications  -C/W Coreg, currently tolerating. Will hold off on increasing during his acute CHF exacerbation.

## 2021-10-28 NOTE — CHART NOTE - NSCHARTNOTEFT_GEN_A_CORE
ICD interrogation performed by [employer] rep.     Device implanted 6/9/21    Device :    [employer] - Acticor 7 VR single chamber ICD      Programmed VDI 40         Battery status: beginning of service                       Voltage: 3.11 V                               Underlying rhythm: sinus rhythm    Mode:  VDI    Lower rate: 40    Tachy Settings:  VT1 (bpm) 182bpm  VF (bpm): 214bpm                 Leads:    RV    Sensing: P-wave  2mV, R-wave 6.0mV    Impedance:  500ohms    Threshold:  0.5V @ 0.4ms    Shock Impedance 61ohms       Pulse amplitude 3.5V @0.4ms               Episodes: episodes of NSVT, most recent on 10/19/21, no ICD therapies delivered.     Changes made: None    Conclusion: Normal device function

## 2021-10-29 LAB
ANION GAP SERPL CALC-SCNC: 5 MMOL/L — SIGNIFICANT CHANGE UP (ref 5–17)
BUN SERPL-MCNC: 26 MG/DL — HIGH (ref 7–23)
CALCIUM SERPL-MCNC: 8.4 MG/DL — LOW (ref 8.5–10.1)
CHLORIDE SERPL-SCNC: 107 MMOL/L — SIGNIFICANT CHANGE UP (ref 96–108)
CO2 SERPL-SCNC: 28 MMOL/L — SIGNIFICANT CHANGE UP (ref 22–31)
CREAT SERPL-MCNC: 1.16 MG/DL — SIGNIFICANT CHANGE UP (ref 0.5–1.3)
GLUCOSE SERPL-MCNC: 120 MG/DL — HIGH (ref 70–99)
HCT VFR BLD CALC: 45.3 % — SIGNIFICANT CHANGE UP (ref 39–50)
HGB BLD-MCNC: 14.4 G/DL — SIGNIFICANT CHANGE UP (ref 13–17)
MAGNESIUM SERPL-MCNC: 2.2 MG/DL — SIGNIFICANT CHANGE UP (ref 1.6–2.6)
MCHC RBC-ENTMCNC: 30 PG — SIGNIFICANT CHANGE UP (ref 27–34)
MCHC RBC-ENTMCNC: 31.8 GM/DL — LOW (ref 32–36)
MCV RBC AUTO: 94.4 FL — SIGNIFICANT CHANGE UP (ref 80–100)
NT-PROBNP SERPL-SCNC: HIGH PG/ML (ref 0–125)
PLATELET # BLD AUTO: 146 K/UL — LOW (ref 150–400)
POTASSIUM SERPL-MCNC: 4.1 MMOL/L — SIGNIFICANT CHANGE UP (ref 3.5–5.3)
POTASSIUM SERPL-SCNC: 4.1 MMOL/L — SIGNIFICANT CHANGE UP (ref 3.5–5.3)
RBC # BLD: 4.8 M/UL — SIGNIFICANT CHANGE UP (ref 4.2–5.8)
RBC # FLD: 16.1 % — HIGH (ref 10.3–14.5)
SODIUM SERPL-SCNC: 140 MMOL/L — SIGNIFICANT CHANGE UP (ref 135–145)
WBC # BLD: 3.76 K/UL — LOW (ref 3.8–10.5)
WBC # FLD AUTO: 3.76 K/UL — LOW (ref 3.8–10.5)

## 2021-10-29 PROCEDURE — 99232 SBSQ HOSP IP/OBS MODERATE 35: CPT

## 2021-10-29 RX ORDER — CARVEDILOL PHOSPHATE 80 MG/1
6.25 CAPSULE, EXTENDED RELEASE ORAL EVERY 12 HOURS
Refills: 0 | Status: DISCONTINUED | OUTPATIENT
Start: 2021-10-29 | End: 2021-10-31

## 2021-10-29 RX ADMIN — SACUBITRIL AND VALSARTAN 1 TABLET(S): 24; 26 TABLET, FILM COATED ORAL at 10:09

## 2021-10-29 RX ADMIN — CARVEDILOL PHOSPHATE 6.25 MILLIGRAM(S): 80 CAPSULE, EXTENDED RELEASE ORAL at 21:17

## 2021-10-29 RX ADMIN — CARVEDILOL PHOSPHATE 3.12 MILLIGRAM(S): 80 CAPSULE, EXTENDED RELEASE ORAL at 05:30

## 2021-10-29 RX ADMIN — Medication 81 MILLIGRAM(S): at 10:08

## 2021-10-29 RX ADMIN — Medication 40 MILLIGRAM(S): at 16:59

## 2021-10-29 RX ADMIN — ATORVASTATIN CALCIUM 40 MILLIGRAM(S): 80 TABLET, FILM COATED ORAL at 21:17

## 2021-10-29 RX ADMIN — SACUBITRIL AND VALSARTAN 1 TABLET(S): 24; 26 TABLET, FILM COATED ORAL at 21:17

## 2021-10-29 RX ADMIN — Medication 40 MILLIGRAM(S): at 10:09

## 2021-10-29 NOTE — PROGRESS NOTE ADULT - ASSESSMENT
61M hx  CAD, prior MI, NICM w HFrEF (15%), s/p AICD, HTN, p/w worsening SOB, found to have acute on chronic HFrEF in setting of dietary indiscretion.    #Acute on chronic HFrEF, hx NICM  -reports diet at shelter is high in salt, pt has limited options for fruit/veg. Alot of cold cut sandwiches  -tele  -on RA at rest, should ambulate for pulsox on RA  -trend daily weight, i/o  -BNP 15K on admission, repeat tmrw  -lipids checked  -A1c 6.2 (pre DM)  -CXR and echo as above, should review echo w cards  -AICD interrogation as above  -on torsemide 20BID at home, will give Lasix 40iv BID here  -continue home BB, coreg  -off losartan now on entresto to optimize CHF management, cost run $3  10/29: up titrate coreg and monitor BP    #DERRICK   -Cr improving, ok to resume Losartan today and will change to Entresto tmrw    #thrombocytopenia- borderline, trend    #CAD, hx MI, HTN  -continue BB  -start entresto  -continue ASA  -start statin    #nicotine dependence  -refusing patch    #Moderate protein-calorie malnutrition    #DVT ppx- SQH    #DNR/DNI, MOSLT done    #from shelter, return after adequate diuresis

## 2021-10-29 NOTE — PROGRESS NOTE ADULT - SUBJECTIVE AND OBJECTIVE BOX
CHIEF COMPLAINT:  Patient is a 61y old  Male who presents with a chief complaint of Shortness of breath    HPI:  60 y/o male with a PMHx of CAD, Nonischemic Cardiomyopathy - CHF (EF in 2016 15-20%) s/p ICD (reports placed about 5 months ago by Dr. Shaikh), nonobstructive CAD, HTN presents to the ED c/o worsening SOB. His symptoms progressed over the last few days. Pt unable to walk short distances without feeling SOB, +orthopnea, +productive cough and abd bloating. Pt lives at the shelter where they have a high sodium diet. No chest pain, palp, fever, Gi issues, urinary issues.  Cardio - [facility]     Patient dx with CHF and started on lasix and admitted to tele.    10/29: Patient's nausea is resolving and his SOB is improving but not at baseline. He still has some abd bloating.      He denies fevers, chills, CP, palp, dizziness, syncope, orthopnea, PND.    PMHx:  PAST MEDICAL & SURGICAL HISTORY:  Heart failure, systolic s/p ICD    CAD (coronary artery disease)    Hypertension    Nonischemic cardiomyopathy    Social History:  Living Situation: Shelter in [place]  Tobacco Use: Cigarettes, 3 per days x 35 years - used to be more  Alcohol Use: occasionally  Drug Use: THC occasionally    COVID VACCINE > Moderna x1, will get second dose in 2 weeks      FAMILY HISTORY:   FAMILY HISTORY:  No pertinent family history in first degree relatives    ALLERGIES:  Allergies  No Known Allergies    Intolerances  pork (Other)    REVIEW OF SYSTEMS:  10 system ROS was obtained, all pertinent positives and negatives are in HPI otherwise they were negative.    Daily Weight in k (29 Oct 2021 08:00)  Daily Weight in k.4 (28 Oct 2021 07:07)    Vital Signs Last 24 Hrs  T(C): 37.3 (28 Oct 2021 19:52), Max: 37.3 (28 Oct 2021 19:52)  T(F): 99.1 (28 Oct 2021 19:52), Max: 99.1 (28 Oct 2021 19:52)  HR: 85 (28 Oct 2021 19:52) (85 - 97)  BP: 111/73 (28 Oct 2021 19:52) (111/73 - 115/85)  BP(mean): 91 (28 Oct 2021 17:50) (91 - 91)  RR: 18 (28 Oct 2021 19:52) (18 - 18)  SpO2: 97% (28 Oct 2021 19:52) (95% - 97%)    I&O's Summary    28 Oct 2021 07:01  -  29 Oct 2021 07:00  --------------------------------------------------------  IN: 924 mL / OUT: 500 mL / NET: 424 mL  -?accurate      PHYSICAL EXAM:   Constitutional: awake and alert in NAD  HEENT: EOMI, Normal Hearing, MMM  Neck: Soft and supple, No LAD, + JVD, no carotid bruit  Respiratory: Breath sounds are clear bilaterally, No wheezing, rales or rhonchi, good air movement  Cardiovascular: S1 and S2, regular rate and rhythm, soft systolic murmur at LSB  Gastrointestinal: RLL crackles  Extremities: Warm and well perfused, no peripheral edema  Neurological: A/O x 3, no focal deficits appreciated  Skin: No rashes appreciated    MEDICATIONS  (STANDING):  aspirin enteric coated 81 milliGRAM(s) Oral daily  atorvastatin 40 milliGRAM(s) Oral at bedtime  carvedilol 3.125 milliGRAM(s) Oral every 12 hours  furosemide   Injectable 40 milliGRAM(s) IV Push two times a day  heparin   Injectable 5000 Unit(s) SubCutaneous every 12 hours  nicotine - 21 mG/24Hr(s) Patch 1 patch Transdermal daily  sacubitril 24 mG/valsartan 26 mG 1 Tablet(s) Oral two times a day    MEDICATIONS  (PRN):  melatonin 5 milliGRAM(s) Oral at bedtime PRN Insomnia  senna 2 Tablet(s) Oral at bedtime PRN Constipation        LABS: All Labs Reviewed:                          14.4   3.76  )-----------( 146      ( 29 Oct 2021 07:43 )             45.3                       14.1   3.78  )-----------( 148      ( 28 Oct 2021 06:58 )             43.4     10-    140  |  107  |  26<H>  ----------------------------<  120<H>  4.1   |  28  |  1.16    Ca    8.4<L>      29 Oct 2021 07:43  Phos  3.4     10  Mg     2.2     10-29    TPro  6.9  /  Alb  3.6  /  TBili  1.3<H>  /  DBili  x   /  AST  39<H>  /  ALT  34  /  AlkPhos  194<H>  10-27    10-28    140  |  107  |  28<H>  ----------------------------<  90  4.3   |  28  |  1.20    Ca    8.4<L>      28 Oct 2021 06:58  Phos  3.4     10-27  Mg     2.3     10-28    TPro  6.9  /  Alb  3.6  /  TBili  1.3<H>  /  DBili  x   /  AST  39<H>  /  ALT  34  /  AlkPhos  194<H>  10-27    Serum Pro-Brain Natriuretic Peptide: 41981 pg/mL (10-27 @ 11:07)    RADIOLOGY:    Reviewed in EMR    EKG:    10/27/21, my interpretation: NSR at 92bpm, LAE, LAD, PRWP    TELEMETRY:    Reviewed. Remains in sinus with few episodes of NSVT (5 beats and 7 beats), asymptomatic    ECHO:    EXAM:  ECHO TTE WO CON COMP W DOPP    PROCEDURE DATE:  10/28/2021      M-Mode Measurements (cm)     LVEDd: 7.38 cm            LVESd: 6.75 cm   IVSEd: 0.7 cm   LVPWd: 0.77 cm            AO Root Dimension: 3.4 cm                ACS: 2.2 cm                             LA: 4.7 cm    Doppler Measurements:     AV Velocity:59.7 cm/s              MV Peak E-Wave: 24.7 cm/s   AV Peak Gradient: 1.43 mmHg        MV Peak A-Wave: 40 cm/s    MV E/A Ratio: 0.62 %   TR Velocity:312 cm/s               MV Peak Gradient: 0.24 mmHg   TR Gradient:38.9376 mmHg   Estimated RAP:15 mmHg   RVSP:53 mmHg     Summary     The left ventricle cavity is severely dilated.   Left ventricle systolic function appears severely and globally impaired;   segmental wall motion abnormalities noted.   Estimated EjectionFraction is 16 %.   The left atrium is mildly dilated.   The right atrium appears moderately dilated.   A device wire is seen in the RV and RA.   Normal appearing right ventricle structure and function.   A device wire is seen in the RV and RA.   Fibrocalcific changes noted to the Aortic valve leaflets with preserved   leaflet excursion.   Fibrocalcific changes noted to the mitral valve leaflets with preserved   leaflet excursion.   EA reversal of the mitral inflow consistent with reduced compliance of the   left ventricle.   Moderate to severe (3+) mitral regurgitation is present.   Normal appearing tricuspid valve structure.   Moderate to severe (3+) tricuspid valve regurgitation is present.   Moderate pulmonary hypertension.   No evidence of pericardial effusion.   IVC is dilated and not collapsing with inspiration.

## 2021-10-29 NOTE — PROGRESS NOTE ADULT - ASSESSMENT
60 y/o male with a PMHx of CAD, Nonischemic Cardiomyopathy - CHF (EF in 2016 15-20%) s/p ICD (reports placed about 5 months ago by Dr. Shaikh), nonobstructive CAD, HTN presents to the ED c/o worsening SOB 2/2 acute on chronic systolic congestive heart failure exacerbation 2/2 dietary noncompliance    -Improving. agree with increase to lasix BID (patient refused yesterday's PM dose because it was offered at 9PM but he will take it this afternoon as long as it is not too late).  -Agree with switching his losartan to entresto  -Will monitor BP on entresto. If BP remains stable and can tolerate will consider increasing his coreg.  -C/W diuresis. I/O not accurate. Recommend making sure both the intake and output are accurate. C/W Daily standing weights.  -Patient has plan with two of his friends from the shelter to try to buy fresh food and share so that they aren't eating the high salt foods. Explained importance of dietary compliance.  -NSVT - has NICM s/p ICD, asymptomatic. C/W BB. Continue to monitor electrolytes which are WNL.

## 2021-10-29 NOTE — PROGRESS NOTE ADULT - SUBJECTIVE AND OBJECTIVE BOX
NP HOSPITALIST PROGRESS NOTE    Chart and meds reviewed.  Patient seen and examined.    CC: SOB    Subjective:  Reports breathing improving, reports hard to maintain low salt diet at shelter, never being given a choice of fruit or veg. "eats a lot of sodium sandwiches"     All other systems reviewed and found to be negative with the exception of what has been described above.    MEDICATIONS  (STANDING):  aspirin enteric coated 81 milliGRAM(s) Oral daily  atorvastatin 40 milliGRAM(s) Oral at bedtime  carvedilol 6.25 milliGRAM(s) Oral every 12 hours  furosemide   Injectable 40 milliGRAM(s) IV Push two times a day  heparin   Injectable 5000 Unit(s) SubCutaneous every 12 hours  nicotine - 21 mG/24Hr(s) Patch 1 patch Transdermal daily  sacubitril 24 mG/valsartan 26 mG 1 Tablet(s) Oral two times a day    MEDICATIONS  (PRN):  melatonin 5 milliGRAM(s) Oral at bedtime PRN Insomnia  senna 2 Tablet(s) Oral at bedtime PRN Constipation    Home Medications:  carvedilol 3.125 mg oral tablet: 1 tab(s) orally 2 times a day (27 Oct 2021 13:50)  losartan 25 mg oral tablet: 1 tab(s) orally once a day (27 Oct 2021 13:50)  Moderna COVID-19 Vaccine  mcg/0.5 mL intramuscular suspension: 0.5 milliliter(s) intramuscular once  ***2nd dose 10/17*** (27 Oct 2021 13:50)  torsemide 20 mg oral tablet: 1 tab(s) orally 2 times a day (27 Oct 2021 13:50)    Vital Signs Last 24 Hrs  T(C): 37.3 (28 Oct 2021 19:52), Max: 37.3 (28 Oct 2021 19:52)  T(F): 99.1 (28 Oct 2021 19:52), Max: 99.1 (28 Oct 2021 19:52)  HR: 85 (28 Oct 2021 19:52) (85 - 97)  BP: 111/73 (28 Oct 2021 19:52) (111/73 - 115/85)  BP(mean): 91 (28 Oct 2021 17:50) (91 - 91)  RR: 18 (28 Oct 2021 19:52) (18 - 18)  SpO2: 97% (28 Oct 2021 19:52) (95% - 97%) ROOM AIR    PHYSICAL EXAM:  Gen: NAD  HEENT:  pupils equal and reactive, EOMI, no oropharyngeal lesions, erythema, exudates, oral thrush  NECK:   supple, no carotid bruits, no palpable lymph nodes, no thyromegaly  CV:  +S1, +S2, regular, no murmurs or rubs  RESP:   lungs w/ + SLIGHT bibasilar crackles  GI:  abdomen soft, non-tender, non-distended, normal BS, no bruits, no abdominal masses, no palpable masses  MSK:   normal muscle tone, no atrophy, no rigidity, no contractions  EXT:  no clubbing, no cyanosis, no edema, no calf pain, swelling or erythema  VASCULAR:  pulses equal and symmetric in the upper and lower extremities  NEURO:  AAOX3, no focal neurological deficits, follows all commands, able to move extremities spontaneously  SKIN:  no ulcers, lesions or rashes    LABS: All Labs Reviewed:                        14.4   3.76  )-----------( 146      ( 29 Oct 2021 07:43 )             45.3     10-29    140  |  107  |  26<H>  ----------------------------<  120<H>  4.1   |  28  |  1.16    Ca    8.4<L>      29 Oct 2021 07:43  Phos  3.4     10-27  Mg     2.2     10-29    CULTURES:  no new    Additional results/Imaging, I have personally reviewed:  CXR 10/27/21: Heart enlargement defibrillator. Right base pleuropulmonary reaction.    Echo 10/28/21: The left ventricle cavity is severely dilated. Left ventricle systolic function appears severely and globally impaired; segmental wall motion abnormalities noted. Estimated EjectionFraction is 16 %. The left atrium is mildly dilated. The right atrium appears moderately dilated. A device wire is seen in the RV and RA. Normal appearing right ventricle structure and function. A device wire is seen in the RV and RA. Fibrocalcific changes noted to the Aortic valve leaflets with preserved leaflet excursion. Fibrocalcific changes noted to the mitral valve leaflets with preserved leaflet excursion. EA reversal of the mitral inflow consistent with reduced compliance of the left ventricle. Moderate to severe (3+) mitral regurgitation is present. Normal appearing tricuspid valve structure. Moderate to severe (3+) tricuspid valve regurgitation is present. Moderate pulmonary hypertension. No evidence of pericardial effusion. IVC is dilated and not collapsing with inspiration.    AICD interrogation 10/28/21:  Episodes: episodes of NSVT, most recent on 10/19/21, no ICD therapies delivered.   Changes made: None  Conclusion: Normal device function.    Telemetry, personally reviewed:  10/29/21: sinus 90-110s, APCs

## 2021-10-30 LAB
ANION GAP SERPL CALC-SCNC: 5 MMOL/L — SIGNIFICANT CHANGE UP (ref 5–17)
BUN SERPL-MCNC: 31 MG/DL — HIGH (ref 7–23)
CALCIUM SERPL-MCNC: 8.3 MG/DL — LOW (ref 8.5–10.1)
CHLORIDE SERPL-SCNC: 106 MMOL/L — SIGNIFICANT CHANGE UP (ref 96–108)
CO2 SERPL-SCNC: 29 MMOL/L — SIGNIFICANT CHANGE UP (ref 22–31)
CREAT SERPL-MCNC: 1.23 MG/DL — SIGNIFICANT CHANGE UP (ref 0.5–1.3)
GLUCOSE SERPL-MCNC: 107 MG/DL — HIGH (ref 70–99)
POTASSIUM SERPL-MCNC: 4.1 MMOL/L — SIGNIFICANT CHANGE UP (ref 3.5–5.3)
POTASSIUM SERPL-SCNC: 4.1 MMOL/L — SIGNIFICANT CHANGE UP (ref 3.5–5.3)
SODIUM SERPL-SCNC: 140 MMOL/L — SIGNIFICANT CHANGE UP (ref 135–145)

## 2021-10-30 PROCEDURE — 99232 SBSQ HOSP IP/OBS MODERATE 35: CPT

## 2021-10-30 RX ADMIN — SACUBITRIL AND VALSARTAN 1 TABLET(S): 24; 26 TABLET, FILM COATED ORAL at 10:43

## 2021-10-30 RX ADMIN — Medication 40 MILLIGRAM(S): at 10:44

## 2021-10-30 RX ADMIN — SACUBITRIL AND VALSARTAN 1 TABLET(S): 24; 26 TABLET, FILM COATED ORAL at 20:56

## 2021-10-30 RX ADMIN — ATORVASTATIN CALCIUM 40 MILLIGRAM(S): 80 TABLET, FILM COATED ORAL at 20:56

## 2021-10-30 RX ADMIN — CARVEDILOL PHOSPHATE 6.25 MILLIGRAM(S): 80 CAPSULE, EXTENDED RELEASE ORAL at 20:56

## 2021-10-30 RX ADMIN — Medication 81 MILLIGRAM(S): at 10:44

## 2021-10-30 RX ADMIN — Medication 40 MILLIGRAM(S): at 17:59

## 2021-10-30 NOTE — PROGRESS NOTE ADULT - SUBJECTIVE AND OBJECTIVE BOX
HOSPITALIST ATTENDING PROGRESS NOTE    Chart and meds reviewed.  Patient seen and examined.    CC: SOB    Subjective: Pt reports good UOP, improved SOB and decreasing ZABRINA.    All other systems reviewed and found to be negative with the exception of what has been described above.    MEDICATIONS  (STANDING):  aspirin enteric coated 81 milliGRAM(s) Oral daily  atorvastatin 40 milliGRAM(s) Oral at bedtime  carvedilol 6.25 milliGRAM(s) Oral every 12 hours  furosemide   Injectable 40 milliGRAM(s) IV Push two times a day  heparin   Injectable 5000 Unit(s) SubCutaneous every 12 hours  nicotine - 21 mG/24Hr(s) Patch 1 patch Transdermal daily  sacubitril 24 mG/valsartan 26 mG 1 Tablet(s) Oral two times a day    MEDICATIONS  (PRN):  melatonin 5 milliGRAM(s) Oral at bedtime PRN Insomnia  senna 2 Tablet(s) Oral at bedtime PRN Constipation      VITALS:  T(F): 98.4 (10-30-21 @ 20:28), Max: 98.4 (10-30-21 @ 20:28)  HR: 89 (10-30-21 @ 20:28) (77 - 92)  BP: 108/83 (10-30-21 @ 20:28) (96/69 - 113/76)  RR: 18 (10-30-21 @ 20:28) (18 - 19)  SpO2: 96% (10-30-21 @ 20:28) (94% - 96%)  Wt(kg): --    I&O's Summary      CAPILLARY BLOOD GLUCOSE          PHYSICAL EXAM:  Gen: NAD  HEENT:  pupils equal and reactive, EOMI, no oropharyngeal lesions, erythema, exudates, oral thrush  NECK:   supple, no carotid bruits, no palpable lymph nodes, no thyromegaly  CV:  +S1, +S2, regular, no murmurs or rubs  RESP:   lungs clear to auscultation bilaterally, no wheezing, rales, rhonchi, good air entry bilaterally, decreased BS at bases b/l  BREAST:  not examined  GI:  abdomen soft, non-tender, non-distended, normal BS, no bruits, no abdominal masses, no palpable masses  RECTAL:  not examined  :  not examined  MSK:   normal muscle tone, no atrophy, no rigidity, no contractions  EXT:  no clubbing, no cyanosis, 1+ LE edema, no calf pain, swelling or erythema  VASCULAR:  pulses equal and symmetric in the upper and lower extremities  NEURO:  AAOX3, no focal neurological deficits, follows all commands, able to move extremities spontaneously  SKIN:  no ulcers, lesions or rashes    LABS:                            14.4   3.76  )-----------( 146      ( 29 Oct 2021 07:43 )             45.3     10-30    140  |  106  |  31<H>  ----------------------------<  107<H>  4.1   |  29  |  1.23    Ca    8.3<L>      30 Oct 2021 07:20  Mg     2.2     10-29    CULTURES:  no new    Additional results/Imaging, I have personally reviewed:  CXR 10/27/21: Heart enlargement defibrillator. Right base pleuropulmonary reaction.    Echo 10/28/21: The left ventricle cavity is severely dilated. Left ventricle systolic function appears severely and globally impaired; segmental wall motion abnormalities noted. Estimated EjectionFraction is 16 %. The left atrium is mildly dilated. The right atrium appears moderately dilated. A device wire is seen in the RV and RA. Normal appearing right ventricle structure and function. A device wire is seen in the RV and RA. Fibrocalcific changes noted to the Aortic valve leaflets with preserved leaflet excursion. Fibrocalcific changes noted to the mitral valve leaflets with preserved leaflet excursion. EA reversal of the mitral inflow consistent with reduced compliance of the left ventricle. Moderate to severe (3+) mitral regurgitation is present. Normal appearing tricuspid valve structure. Moderate to severe (3+) tricuspid valve regurgitation is present. Moderate pulmonary hypertension. No evidence of pericardial effusion. IVC is dilated and not collapsing with inspiration.    AICD interrogation 10/28/21:  Episodes: episodes of NSVT, most recent on 10/19/21, no ICD therapies delivered.   Changes made: None  Conclusion: Normal device function.    Telemetry, personally reviewed:  10/29/21: sinus 90-110s, APCs  10/30/21: NSR, NSVT

## 2021-10-30 NOTE — PROGRESS NOTE ADULT - ASSESSMENT
61M hx  CAD, prior MI, NICM w HFrEF (15%), s/p AICD, HTN, p/w worsening SOB, found to have acute on chronic HFrEF in setting of dietary indiscretion.    #Acute on chronic HFrEF, hx NICM  -reports diet at shelter is high in salt, pt has limited options for fruit/veg. Alot of cold cut sandwiches  -tele  -on RA at rest, should ambulate for pulsox on RA  -trend daily weight, i/o  -BNP 15K on admission, improing  -lipids checked  -A1c 6.2 (pre DM)  -CXR and echo as above  -AICD interrogation as above  -on torsemide 20BID at home, giving Lasix 40iv BID here, potentially change back to torsemide tmrw  -continue coreg, uptitrate as needed/tolerated  -off losartan now on entresto to optimize CHF management, cost run $3    #DERRICK   -Cr improving, trend w diuresis    #thrombocytopenia- borderline, trend    #CAD, hx MI, HTN  -coreg  -start entresto  -continue ASA  -started statin    #nicotine dependence  -refusing patch    #Moderate protein-calorie malnutrition    #DVT ppx- SQH    #DNR/DNI, MOSLT done    #from shelter, return after adequate diuresis

## 2021-10-30 NOTE — PROGRESS NOTE ADULT - SUBJECTIVE AND OBJECTIVE BOX
CHIEF COMPLAINT:  Patient is a 61y old  Male who presents with a chief complaint of Shortness of breath    HPI:  60 y/o male with a PMHx of CAD, Nonischemic Cardiomyopathy - CHF (EF in 2016 15-20%) s/p ICD (reports placed about 5 months ago by Dr. Shaikh), nonobstructive CAD, HTN presents to the ED c/o worsening SOB. His symptoms progressed over the last few days. Pt unable to walk short distances without feeling SOB, +orthopnea, +productive cough and abd bloating. Pt lives at the shelter where they have a high sodium diet. No chest pain, palp, fever, Gi issues, urinary issues.  Cardio - [facility]     Patient dx with CHF and started on lasix and admitted to tele.    10/29: Patient's nausea is resolving and his SOB is improving but not at baseline. He still has some abd bloating.  10/30: patient continues to improve. He has some episodes of asymptomatic NSVT on tele.      He denies fevers, chills, CP, palp, dizziness, syncope, orthopnea, PND.    PMHx:  PAST MEDICAL & SURGICAL HISTORY:  Heart failure, systolic s/p ICD    CAD (coronary artery disease)    Hypertension    Nonischemic cardiomyopathy    Social History:  Living Situation: Shelter in [place]  Tobacco Use: Cigarettes, 3 per days x 35 years - used to be more  Alcohol Use: occasionally  Drug Use: THC occasionally    COVID VACCINE > Moderna x1, will get second dose in 2 weeks      FAMILY HISTORY:   FAMILY HISTORY:  No pertinent family history in first degree relatives    ALLERGIES:  Allergies  No Known Allergies    Intolerances  pork (Other)    REVIEW OF SYSTEMS:  10 system ROS was obtained, all pertinent positives and negatives are in HPI otherwise they were negative.    Weight not done yet today 10/30  Daily Weight in k (29 Oct 2021 08:00)  Daily Weight in k.4 (28 Oct 2021 07:07)    Vital Signs Last 24 Hrs  T(C): 36.6 (30 Oct 2021 07:38), Max: 36.8 (29 Oct 2021 21:08)  T(F): 97.8 (30 Oct 2021 07:38), Max: 98.2 (29 Oct 2021 21:08)  HR: 77 (30 Oct 2021 07:38) (77 - 150)  BP: 96/69 (30 Oct 2021 07:38) (96/69 - 113/76)  BP(mean): --  RR: 19 (30 Oct 2021 07:38) (18 - 19)  SpO2: 96% (30 Oct 2021 07:38) (94% - 96%)    I&O's Summary  -not being done    PHYSICAL EXAM:   Constitutional: awake and alert in NAD  HEENT: EOMI, Normal Hearing, MMM  Neck: Soft and supple, No LAD, + JVD, no carotid bruit  Respiratory: Breath sounds are clear bilaterally, No wheezing, rales or rhonchi, good air movement  Cardiovascular: S1 and S2, regular rate and rhythm, III/VI systolic murmur at LSB and apex  Gastrointestinal: Decreased BS on RLL  Extremities: Warm and well perfused, no peripheral edema  Neurological: A/O x 3, no focal deficits appreciated  Skin: No rashes appreciated    MEDICATIONS  (STANDING):  aspirin enteric coated 81 milliGRAM(s) Oral daily  atorvastatin 40 milliGRAM(s) Oral at bedtime  carvedilol 6.25 milliGRAM(s) Oral every 12 hours  furosemide   Injectable 40 milliGRAM(s) IV Push two times a day  heparin   Injectable 5000 Unit(s) SubCutaneous every 12 hours  nicotine - 21 mG/24Hr(s) Patch 1 patch Transdermal daily  sacubitril 24 mG/valsartan 26 mG 1 Tablet(s) Oral two times a day    MEDICATIONS  (PRN):  melatonin 5 milliGRAM(s) Oral at bedtime PRN Insomnia  senna 2 Tablet(s) Oral at bedtime PRN Constipation      LABS: All Labs Reviewed:                                     14.4   3.76  )-----------( 146      ( 29 Oct 2021 07:43 )             45.3                       14.1   3.78  )-----------( 148      ( 28 Oct 2021 06:58 )             43.4     10-30    140  |  106  |  31<H>  ----------------------------<  107<H>  4.1   |  29  |  1.23    Ca    8.3<L>      30 Oct 2021 07:20  Mg     2.2     10-29      10-29    140  |  107  |  26<H>  ----------------------------<  120<H>  4.1   |  28  |  1.16    Ca    8.4<L>      29 Oct 2021 07:43  Phos  3.4     10-27  Mg     2.2     10-29    TPro  6.9  /  Alb  3.6  /  TBili  1.3<H>  /  DBili  x   /  AST  39<H>  /  ALT  34  /  AlkPhos  194<H>  10-27    10-28    140  |  107  |  28<H>  ----------------------------<  90  4.3   |  28  |  1.20    Ca    8.4<L>      28 Oct 2021 06:58  Phos  3.4     10-27  Mg     2.3     10-28    TPro  6.9  /  Alb  3.6  /  TBili  1.3<H>  /  DBili  x   /  AST  39<H>  /  ALT  34  /  AlkPhos  194<H>  10-27    Serum Pro-Brain Natriuretic Peptide: 64867 pg/mL (10-27 @ 11:07)    RADIOLOGY:    Reviewed in EMR    EKG:    10/27/21, my interpretation: NSR at 92bpm, LAE, LAD, PRWP    TELEMETRY:    Reviewed. Remains in sinus with few episodes of NSVT, asymptomatic    ECHO:    EXAM:  ECHO TTE WO CON COMP W DOPP    PROCEDURE DATE:  10/28/2021      M-Mode Measurements (cm)     LVEDd: 7.38 cm            LVESd: 6.75 cm   IVSEd: 0.7 cm   LVPWd: 0.77 cm            AO Root Dimension: 3.4 cm                ACS: 2.2 cm                             LA: 4.7 cm    Doppler Measurements:     AV Velocity:59.7 cm/s              MV Peak E-Wave: 24.7 cm/s   AV Peak Gradient: 1.43 mmHg        MV Peak A-Wave: 40 cm/s    MV E/A Ratio: 0.62 %   TR Velocity:312 cm/s               MV Peak Gradient: 0.24 mmHg   TR Gradient:38.9376 mmHg   Estimated RAP:15 mmHg   RVSP:53 mmHg     Summary     The left ventricle cavity is severely dilated.   Left ventricle systolic function appears severely and globally impaired;   segmental wall motion abnormalities noted.   Estimated EjectionFraction is 16 %.   The left atrium is mildly dilated.   The right atrium appears moderately dilated.   A device wire is seen in the RV and RA.   Normal appearing right ventricle structure and function.   A device wire is seen in the RV and RA.   Fibrocalcific changes noted to the Aortic valve leaflets with preserved   leaflet excursion.   Fibrocalcific changes noted to the mitral valve leaflets with preserved   leaflet excursion.   EA reversal of the mitral inflow consistent with reduced compliance of the   left ventricle.   Moderate to severe (3+) mitral regurgitation is present.   Normal appearing tricuspid valve structure.   Moderate to severe (3+) tricuspid valve regurgitation is present.   Moderate pulmonary hypertension.   No evidence of pericardial effusion.   IVC is dilated and not collapsing with inspiration.

## 2021-10-30 NOTE — PROGRESS NOTE ADULT - ASSESSMENT
62 y/o male with a PMHx of CAD, Nonischemic Cardiomyopathy - CHF (EF in 2016 15-20%) s/p ICD (reports placed about 5 months ago by Dr. Shaikh), nonobstructive CAD, HTN presents to the ED c/o worsening SOB 2/2 acute on chronic systolic congestive heart failure exacerbation 2/2 dietary noncompliance    -Continues to improve.  -BP on lower side but tolerating well  -Still a little hypervolemic, BUN/Cr slightly increase. Agree with another day of IV lasix, but will likely need to transition to PO tomorrow. Can go back on his home torsemide.  -With low BP will not make any other adjustments to his CM medications, C/W entresto and Coreg at current doses.  -daily weights and strict I/O are not being done despite them being ordered and patient urinating into urinal. Need to make sure this is getting done to help determine how well he is diuresing.  -C/W monitoring renal function and electrolytes.

## 2021-10-31 LAB
ANION GAP SERPL CALC-SCNC: 6 MMOL/L — SIGNIFICANT CHANGE UP (ref 5–17)
BUN SERPL-MCNC: 33 MG/DL — HIGH (ref 7–23)
CALCIUM SERPL-MCNC: 8.4 MG/DL — LOW (ref 8.5–10.1)
CHLORIDE SERPL-SCNC: 105 MMOL/L — SIGNIFICANT CHANGE UP (ref 96–108)
CO2 SERPL-SCNC: 30 MMOL/L — SIGNIFICANT CHANGE UP (ref 22–31)
CREAT SERPL-MCNC: 1.17 MG/DL — SIGNIFICANT CHANGE UP (ref 0.5–1.3)
GLUCOSE SERPL-MCNC: 95 MG/DL — SIGNIFICANT CHANGE UP (ref 70–99)
MAGNESIUM SERPL-MCNC: 2.3 MG/DL — SIGNIFICANT CHANGE UP (ref 1.6–2.6)
POTASSIUM SERPL-MCNC: 4.3 MMOL/L — SIGNIFICANT CHANGE UP (ref 3.5–5.3)
POTASSIUM SERPL-SCNC: 4.3 MMOL/L — SIGNIFICANT CHANGE UP (ref 3.5–5.3)
SODIUM SERPL-SCNC: 141 MMOL/L — SIGNIFICANT CHANGE UP (ref 135–145)

## 2021-10-31 PROCEDURE — 99232 SBSQ HOSP IP/OBS MODERATE 35: CPT

## 2021-10-31 RX ORDER — CARVEDILOL PHOSPHATE 80 MG/1
12.5 CAPSULE, EXTENDED RELEASE ORAL EVERY 12 HOURS
Refills: 0 | Status: DISCONTINUED | OUTPATIENT
Start: 2021-10-31 | End: 2021-11-03

## 2021-10-31 RX ADMIN — Medication 20 MILLIGRAM(S): at 21:08

## 2021-10-31 RX ADMIN — SACUBITRIL AND VALSARTAN 1 TABLET(S): 24; 26 TABLET, FILM COATED ORAL at 11:39

## 2021-10-31 RX ADMIN — Medication 81 MILLIGRAM(S): at 11:43

## 2021-10-31 RX ADMIN — CARVEDILOL PHOSPHATE 12.5 MILLIGRAM(S): 80 CAPSULE, EXTENDED RELEASE ORAL at 21:09

## 2021-10-31 RX ADMIN — ATORVASTATIN CALCIUM 40 MILLIGRAM(S): 80 TABLET, FILM COATED ORAL at 21:08

## 2021-10-31 RX ADMIN — SACUBITRIL AND VALSARTAN 1 TABLET(S): 24; 26 TABLET, FILM COATED ORAL at 21:08

## 2021-10-31 RX ADMIN — Medication 20 MILLIGRAM(S): at 11:40

## 2021-10-31 RX ADMIN — CARVEDILOL PHOSPHATE 6.25 MILLIGRAM(S): 80 CAPSULE, EXTENDED RELEASE ORAL at 11:43

## 2021-10-31 NOTE — PROGRESS NOTE ADULT - ASSESSMENT
60 y/o male with a PMHx of CAD, Nonischemic Cardiomyopathy - CHF (EF in 2016 15-20%) s/p ICD (reports placed about 5 months ago by Dr. Shaikh), nonobstructive CAD, HTN presents to the ED c/o worsening SOB 2/2 acute on chronic systolic congestive heart failure exacerbation 2/2 dietary noncompliance    -NSVT - asymptomatic, agree with increasing coreg. electrolytes WNL  -diuresed well and much improved, transitioned to his home torsimide  -C/W low salt diet.  -C/w entresto at current dose with lower BP  -Dispo as per primary team. Plan for possible D/C tomorrow. Recommend close outpatient f/u with his outside cardiologist.

## 2021-10-31 NOTE — PROGRESS NOTE ADULT - SUBJECTIVE AND OBJECTIVE BOX
CHIEF COMPLAINT:  Patient is a 61y old  Male who presents with a chief complaint of Shortness of breath    HPI:  60 y/o male with a PMHx of CAD, Nonischemic Cardiomyopathy - CHF (EF in 2016 15-20%) s/p ICD (reports placed about 5 months ago by Dr. Shaikh), nonobstructive CAD, HTN presents to the ED c/o worsening SOB. His symptoms progressed over the last few days. Pt unable to walk short distances without feeling SOB, +orthopnea, +productive cough and abd bloating. Pt lives at the shelter where they have a high sodium diet. No chest pain, palp, fever, Gi issues, urinary issues.  Cardio - [facility]     Patient dx with CHF and started on lasix and admitted to tele.    10/29: Patient's nausea is resolving and his SOB is improving but not at baseline. He still has some abd bloating.  10/30: patient continues to improve. He has some episodes of asymptomatic NSVT on tele.  10/31: SOB and abd bloating continue to improve. Had some asymptomatic NSVT      He denies fevers, chills, CP, palp, dizziness, syncope, orthopnea, PND.    PMHx:  PAST MEDICAL & SURGICAL HISTORY:  Heart failure, systolic s/p ICD    CAD (coronary artery disease)    Hypertension    Nonischemic cardiomyopathy    Social History:  Living Situation: Shelter in [place]  Tobacco Use: Cigarettes, 3 per days x 35 years - used to be more  Alcohol Use: occasionally  Drug Use: THC occasionally    COVID VACCINE > Moderna x1, will get second dose in 2 weeks      FAMILY HISTORY:   FAMILY HISTORY:  No pertinent family history in first degree relatives    ALLERGIES:  Allergies  No Known Allergies    Intolerances  pork (Other)    REVIEW OF SYSTEMS:  10 system ROS was obtained, all pertinent positives and negatives are in HPI otherwise they were negative.       Daily Weight in k.3 (31 Oct 2021 06:57)  Weight not done yet today 10/30  Daily Weight in k (29 Oct 2021 08:00)  Daily Weight in k.4 (28 Oct 2021 07:07)    Vital Signs Last 24 Hrs  T(C): 37.1 (31 Oct 2021 08:49), Max: 37.1 (31 Oct 2021 08:49)  T(F): 98.8 (31 Oct 2021 08:49), Max: 98.8 (31 Oct 2021 08:49)  HR: 82 (31 Oct 2021 08:49) (82 - 89)  BP: 110/78 (31 Oct 2021 08:49) (108/83 - 110/78)  BP(mean): --  RR: 18 (31 Oct 2021 08:49) (18 - 18)  SpO2: 93% (31 Oct 2021 08:49) (93% - 96%)    I/O not accurate    PHYSICAL EXAM:   Constitutional: awake and alert in NAD  HEENT: EOMI, Normal Hearing, MMM  Neck: Soft and supple, No LAD, + JVD, no carotid bruit  Respiratory: Breath sounds are clear bilaterally, No wheezing, rales or rhonchi, good air movement  Cardiovascular: S1 and S2, regular rate and rhythm, III/VI systolic murmur at LSB and apex  Gastrointestinal: Decreased BS on RLL  Extremities: Warm and well perfused, no peripheral edema  Neurological: A/O x 3, no focal deficits appreciated  Skin: No rashes appreciated    MEDICATIONS  (STANDING):  aspirin enteric coated 81 milliGRAM(s) Oral daily  atorvastatin 40 milliGRAM(s) Oral at bedtime  carvedilol 6.25 milliGRAM(s) Oral every 12 hours  heparin   Injectable 5000 Unit(s) SubCutaneous every 12 hours  nicotine - 21 mG/24Hr(s) Patch 1 patch Transdermal daily  sacubitril 24 mG/valsartan 26 mG 1 Tablet(s) Oral two times a day  torsemide 20 milliGRAM(s) Oral two times a day    MEDICATIONS  (PRN):  melatonin 5 milliGRAM(s) Oral at bedtime PRN Insomnia  senna 2 Tablet(s) Oral at bedtime PRN Constipation      LABS: All Labs Reviewed:                                     14.4   3.76  )-----------( 146      ( 29 Oct 2021 07:43 )             45.3                       14.1   3.78  )-----------( 148      ( 28 Oct 2021 06:58 )             43.4     10-31    141  |  105  |  33<H>  ----------------------------<  95  4.3   |  30  |  1.17    Ca    8.4<L>      31 Oct 2021 06:40  Mg     2.3     10-31      10-30    140  |  106  |  31<H>  ----------------------------<  107<H>  4.1   |  29  |  1.23    Ca    8.3<L>      30 Oct 2021 07:20  Mg     2.2     10-29      10-29    140  |  107  |  26<H>  ----------------------------<  120<H>  4.1   |  28  |  1.16    Ca    8.4<L>      29 Oct 2021 07:43  Phos  3.4     10-27  Mg     2.2     10-29    TPro  6.9  /  Alb  3.6  /  TBili  1.3<H>  /  DBili  x   /  AST  39<H>  /  ALT  34  /  AlkPhos  194<H>  10-27    10-28    140  |  107  |  28<H>  ----------------------------<  90  4.3   |  28  |  1.20    Ca    8.4<L>      28 Oct 2021 06:58  Phos  3.4     10-27  Mg     2.3     10-28    TPro  6.9  /  Alb  3.6  /  TBili  1.3<H>  /  DBili  x   /  AST  39<H>  /  ALT  34  /  AlkPhos  194<H>  10-27    Serum Pro-Brain Natriuretic Peptide: 80704 pg/mL (10-27 @ 11:07)    RADIOLOGY:    Reviewed in EMR    EKG:    10/27/21, my interpretation: NSR at 92bpm, LAE, LAD, PRWP    TELEMETRY:    Reviewed. Remains in sinus with few episodes of NSVT, asymptomatic    ECHO:    EXAM:  ECHO TTE WO CON COMP W DOPP    PROCEDURE DATE:  10/28/2021      M-Mode Measurements (cm)     LVEDd: 7.38 cm            LVESd: 6.75 cm   IVSEd: 0.7 cm   LVPWd: 0.77 cm            AO Root Dimension: 3.4 cm                ACS: 2.2 cm                             LA: 4.7 cm    Doppler Measurements:     AV Velocity:59.7 cm/s              MV Peak E-Wave: 24.7 cm/s   AV Peak Gradient: 1.43 mmHg        MV Peak A-Wave: 40 cm/s    MV E/A Ratio: 0.62 %   TR Velocity:312 cm/s               MV Peak Gradient: 0.24 mmHg   TR Gradient:38.9376 mmHg   Estimated RAP:15 mmHg   RVSP:53 mmHg     Summary     The left ventricle cavity is severely dilated.   Left ventricle systolic function appears severely and globally impaired;   segmental wall motion abnormalities noted.   Estimated EjectionFraction is 16 %.   The left atrium is mildly dilated.   The right atrium appears moderately dilated.   A device wire is seen in the RV and RA.   Normal appearing right ventricle structure and function.   A device wire is seen in the RV and RA.   Fibrocalcific changes noted to the Aortic valve leaflets with preserved   leaflet excursion.   Fibrocalcific changes noted to the mitral valve leaflets with preserved   leaflet excursion.   EA reversal of the mitral inflow consistent with reduced compliance of the   left ventricle.   Moderate to severe (3+) mitral regurgitation is present.   Normal appearing tricuspid valve structure.   Moderate to severe (3+) tricuspid valve regurgitation is present.   Moderate pulmonary hypertension.   No evidence of pericardial effusion.   IVC is dilated and not collapsing with inspiration.

## 2021-10-31 NOTE — PROGRESS NOTE ADULT - ASSESSMENT
61M hx  CAD, prior MI, NICM w HFrEF (15%), s/p AICD, HTN, p/w worsening SOB, found to have acute on chronic HFrEF in setting of dietary indiscretion.    #Acute on chronic HFrEF, hx NICM  -reports diet at shelter is high in salt, pt has limited options for fruit/veg. Alot of cold cut sandwiches  -tele  -on RA at rest, should ambulate for pulsox on RA prior to d/c  -trend daily weight, i/o  -BNP 15K on admission, improving  -lipids checked  -A1c 6.2 (pre DM)  -CXR and echo as above  -AICD interrogation as above  -on torsemide 20BID at home, giving Lasix 40iv BID here, changed back to home torsemide dose today  -continue coreg, uptitrate as needed/tolerated  -off losartan, now on entresto to optimize CHF management, cost run $3    #NSVT  -uptitrated coreg again today    #DERRICK   -Cr improving, trend w diuresis    #thrombocytopenia- borderline, trend    #CAD, hx MI, HTN  -coreg  -start entresto  -continue ASA  -started statin    #nicotine dependence  -refusing patch    #Moderate protein-calorie malnutrition    #DVT ppx- SQH    #DNR/DNI, MOSLT done    #from shelter, anticipate return on 11/1

## 2021-10-31 NOTE — PROGRESS NOTE ADULT - SUBJECTIVE AND OBJECTIVE BOX
HOSPITALIST ATTENDING PROGRESS NOTE    Chart and meds reviewed.  Patient seen and examined.    CC: SOB    Subjective: Resting comfortably today. Changed diuresis back to po torsemide, increased coreg.    All other systems reviewed and found to be negative with the exception of what has been described above.    MEDICATIONS  (STANDING):  aspirin enteric coated 81 milliGRAM(s) Oral daily  atorvastatin 40 milliGRAM(s) Oral at bedtime  carvedilol 12.5 milliGRAM(s) Oral every 12 hours  heparin   Injectable 5000 Unit(s) SubCutaneous every 12 hours  nicotine - 21 mG/24Hr(s) Patch 1 patch Transdermal daily  sacubitril 24 mG/valsartan 26 mG 1 Tablet(s) Oral two times a day  torsemide 20 milliGRAM(s) Oral two times a day    MEDICATIONS  (PRN):  melatonin 5 milliGRAM(s) Oral at bedtime PRN Insomnia  senna 2 Tablet(s) Oral at bedtime PRN Constipation      VITALS:  T(F): 98.8 (10-31-21 @ 08:49), Max: 98.8 (10-31-21 @ 08:49)  HR: 82 (10-31-21 @ 08:49) (82 - 89)  BP: 110/78 (10-31-21 @ 08:49) (108/83 - 110/78)  RR: 18 (10-31-21 @ 08:49) (18 - 18)  SpO2: 93% (10-31-21 @ 08:49) (93% - 96%)  Wt(kg): --    I&O's Summary    31 Oct 2021 07:01  -  31 Oct 2021 15:27  --------------------------------------------------------  IN: 0 mL / OUT: 300 mL / NET: -300 mL        CAPILLARY BLOOD GLUCOSE          PHYSICAL EXAM:  Gen: NAD  HEENT:  pupils equal and reactive, EOMI, no oropharyngeal lesions, erythema, exudates, oral thrush  NECK:   supple, no carotid bruits, no palpable lymph nodes, no thyromegaly  CV:  +S1, +S2, regular, no murmurs or rubs  RESP:   lungs clear to auscultation bilaterally, no wheezing, rales, rhonchi, good air entry bilaterally, scattered crackles b/l  BREAST:  not examined  GI:  abdomen soft, non-tender, non-distended, normal BS, no bruits, no abdominal masses, no palpable masses  RECTAL:  not examined  :  not examined  MSK:   normal muscle tone, no atrophy, no rigidity, no contractions  EXT:  no clubbing, no cyanosis, no edema, no calf pain, swelling or erythema  VASCULAR:  pulses equal and symmetric in the upper and lower extremities  NEURO:  AAOX3, no focal neurological deficits, follows all commands, able to move extremities spontaneously  SKIN:  no ulcers, lesions or rashes    LABS:        10-31    141  |  105  |  33<H>  ----------------------------<  95  4.3   |  30  |  1.17    Ca    8.4<L>      31 Oct 2021 06:40  Mg     2.3     10-31    CULTURES:  no new    Additional results/Imaging, I have personally reviewed:  CXR 10/27/21: Heart enlargement defibrillator. Right base pleuropulmonary reaction.    Echo 10/28/21: The left ventricle cavity is severely dilated. Left ventricle systolic function appears severely and globally impaired; segmental wall motion abnormalities noted. Estimated EjectionFraction is 16 %. The left atrium is mildly dilated. The right atrium appears moderately dilated. A device wire is seen in the RV and RA. Normal appearing right ventricle structure and function. A device wire is seen in the RV and RA. Fibrocalcific changes noted to the Aortic valve leaflets with preserved leaflet excursion. Fibrocalcific changes noted to the mitral valve leaflets with preserved leaflet excursion. EA reversal of the mitral inflow consistent with reduced compliance of the left ventricle. Moderate to severe (3+) mitral regurgitation is present. Normal appearing tricuspid valve structure. Moderate to severe (3+) tricuspid valve regurgitation is present. Moderate pulmonary hypertension. No evidence of pericardial effusion. IVC is dilated and not collapsing with inspiration.    AICD interrogation 10/28/21:  Episodes: episodes of NSVT, most recent on 10/19/21, no ICD therapies delivered.   Changes made: None  Conclusion: Normal device function.    Telemetry, personally reviewed:  10/29/21: sinus 90-110s, APCs  10/30/21: NSR, NSVT  10/31/21: sinus 70-90, NSVT, SVT earlier in stay

## 2021-11-01 ENCOUNTER — OUTPATIENT (OUTPATIENT)
Dept: OUTPATIENT SERVICES | Facility: HOSPITAL | Age: 61
LOS: 1 days | End: 2021-11-01
Payer: MEDICAID

## 2021-11-01 ENCOUNTER — TRANSCRIPTION ENCOUNTER (OUTPATIENT)
Age: 61
End: 2021-11-01

## 2021-11-01 DIAGNOSIS — I47.2 VENTRICULAR TACHYCARDIA: ICD-10-CM

## 2021-11-01 DIAGNOSIS — I25.10 ATHEROSCLEROTIC HEART DISEASE OF NATIVE CORONARY ARTERY WITHOUT ANGINA PECTORIS: ICD-10-CM

## 2021-11-01 DIAGNOSIS — I42.8 OTHER CARDIOMYOPATHIES: ICD-10-CM

## 2021-11-01 DIAGNOSIS — F17.200 NICOTINE DEPENDENCE, UNSPECIFIED, UNCOMPLICATED: ICD-10-CM

## 2021-11-01 DIAGNOSIS — I50.20 UNSPECIFIED SYSTOLIC (CONGESTIVE) HEART FAILURE: ICD-10-CM

## 2021-11-01 LAB
ANION GAP SERPL CALC-SCNC: 6 MMOL/L — SIGNIFICANT CHANGE UP (ref 5–17)
BUN SERPL-MCNC: 38 MG/DL — HIGH (ref 7–23)
CALCIUM SERPL-MCNC: 8.2 MG/DL — LOW (ref 8.5–10.1)
CHLORIDE SERPL-SCNC: 105 MMOL/L — SIGNIFICANT CHANGE UP (ref 96–108)
CO2 SERPL-SCNC: 29 MMOL/L — SIGNIFICANT CHANGE UP (ref 22–31)
CREAT SERPL-MCNC: 1.37 MG/DL — HIGH (ref 0.5–1.3)
GLUCOSE SERPL-MCNC: 87 MG/DL — SIGNIFICANT CHANGE UP (ref 70–99)
MAGNESIUM SERPL-MCNC: 2.2 MG/DL — SIGNIFICANT CHANGE UP (ref 1.6–2.6)
POTASSIUM SERPL-MCNC: 4.4 MMOL/L — SIGNIFICANT CHANGE UP (ref 3.5–5.3)
POTASSIUM SERPL-SCNC: 4.4 MMOL/L — SIGNIFICANT CHANGE UP (ref 3.5–5.3)
SARS-COV-2 RNA SPEC QL NAA+PROBE: SIGNIFICANT CHANGE UP
SODIUM SERPL-SCNC: 140 MMOL/L — SIGNIFICANT CHANGE UP (ref 135–145)

## 2021-11-01 PROCEDURE — 99232 SBSQ HOSP IP/OBS MODERATE 35: CPT

## 2021-11-01 RX ADMIN — Medication 20 MILLIGRAM(S): at 21:48

## 2021-11-01 RX ADMIN — HEPARIN SODIUM 5000 UNIT(S): 5000 INJECTION INTRAVENOUS; SUBCUTANEOUS at 21:48

## 2021-11-01 RX ADMIN — ATORVASTATIN CALCIUM 40 MILLIGRAM(S): 80 TABLET, FILM COATED ORAL at 21:49

## 2021-11-01 RX ADMIN — CARVEDILOL PHOSPHATE 12.5 MILLIGRAM(S): 80 CAPSULE, EXTENDED RELEASE ORAL at 10:17

## 2021-11-01 RX ADMIN — Medication 20 MILLIGRAM(S): at 10:15

## 2021-11-01 RX ADMIN — SACUBITRIL AND VALSARTAN 1 TABLET(S): 24; 26 TABLET, FILM COATED ORAL at 21:49

## 2021-11-01 RX ADMIN — Medication 81 MILLIGRAM(S): at 10:17

## 2021-11-01 RX ADMIN — CARVEDILOL PHOSPHATE 12.5 MILLIGRAM(S): 80 CAPSULE, EXTENDED RELEASE ORAL at 21:49

## 2021-11-01 RX ADMIN — SACUBITRIL AND VALSARTAN 1 TABLET(S): 24; 26 TABLET, FILM COATED ORAL at 10:14

## 2021-11-01 NOTE — DISCHARGE NOTE PROVIDER - NSDCMRMEDTOKEN_GEN_ALL_CORE_FT
aspirin 81 mg oral delayed release tablet: 1 tab(s) orally once a day  carvedilol 3.125 mg oral tablet: 1 tab(s) orally 2 times a day  Entresto 24 mg-26 mg oral tablet: 1 tab(s) orally 2 times a day   losartan 25 mg oral tablet: 1 tab(s) orally once a day  Moderna COVID-19 Vaccine  mcg/0.5 mL intramuscular suspension: 0.5 milliliter(s) intramuscular once  ***2nd dose 10/17***  torsemide 20 mg oral tablet: 1 tab(s) orally 2 times a day   aspirin 81 mg oral delayed release tablet: 1 tab(s) orally once a day  atorvastatin 40 mg oral tablet: 1 tab(s) orally once a day (at bedtime)  carvedilol 12.5 mg oral tablet: 1 tab(s) orally every 12 hours  losartan 25 mg oral tablet: 1 tab(s) orally once a day  nicotine 21 mg/24 hr transdermal film, extended release: 1 patch transdermal once a day   torsemide 20 mg oral tablet: 1 tab(s) orally 2 times a day

## 2021-11-01 NOTE — DISCHARGE NOTE NURSING/CASE MANAGEMENT/SOCIAL WORK - NSDCFUADDAPPT_GEN_ALL_CORE_FT
APPOINTMENT: Thursday 11/4/2021  9a.m; Dr Bella Rasmussen-[facility] [address] [place]; [phone] APPOINTMENT: Thursday 11/4/2021  9a.m; Dr Bella Rasmussen-[place] [id number] [facility]; [phone]  Appointment made for monday 11/08/21 at 12 noon with dr. cristino hanson.

## 2021-11-01 NOTE — PROGRESS NOTE ADULT - ASSESSMENT
62 y/o male with a PMHx of CAD, Nonischemic Cardiomyopathy - CHF (EF in 2016 15-20%) s/p ICD (reports placed about 5 months ago by Dr. Shaikh), nonobstructive CAD, HTN presents to the ED c/o worsening SOB 2/2 acute on chronic systolic congestive heart failure exacerbation 2/2 dietary noncompliance    -Patient much improved. Agree with current CM medication. Unable to make changes due to low BP  -C/W low salt diet.  -Dispo as per primary team. OK to D/C from cardiac standpoint with close outpatient f/u with his outside cardiologist.

## 2021-11-01 NOTE — DISCHARGE NOTE PROVIDER - NSDCCPCAREPLAN_GEN_ALL_CORE_FT
PRINCIPAL DISCHARGE DIAGNOSIS  Diagnosis: Acute on chronic systolic congestive heart failure  Assessment and Plan of Treatment: WHAT IS HEART FAILURE? Heart failure (HF) is a condition in which the heart cannot pump enough blood to meet the body’s needs. The weakening of the heart’s pumping ability results in the buildup of fluid in the feet, ankles and legs resulting in edema, tiredness, and shortness of breath.  THINGS TO DO: (1) Weigh yourself daily – keep a log for you cardiologist (2) Maintain a heart healthy diet and limit dietary sodium (3) Drink liquids as directed – you may need to limit the amount of liquid you drink to prevent fluid buildup (4) Maintain a healthy weight (5) Stay active with exercise  MONITOR THESE SIGNS AND SYMPTOMS: (1) Worsening shortness of breath at rest or at night (2) worsening leg swelling (3) Abdominal pain or swelling (4) Chest pain or palpitations (5) Weight gain of 5lbs or more in 1 week or 2-3lbs in 24hours. If you experience any of these, DO alert your primary care provider, or return to the Emergency Department if you feel very sick.      SECONDARY DISCHARGE DIAGNOSES  Diagnosis: Stage 2 chronic kidney disease  Assessment and Plan of Treatment: Follow up with your primary care provider after discharge, obtain repeat labs in 3 to 5 days. Creatinine on discharge is 1.31.     PRINCIPAL DISCHARGE DIAGNOSIS  Diagnosis: Acute on chronic systolic congestive heart failure  Assessment and Plan of Treatment: WHAT IS HEART FAILURE? Heart failure (HF) is a condition in which the heart cannot pump enough blood to meet the body’s needs. The weakening of the heart’s pumping ability results in the buildup of fluid in the feet, ankles and legs resulting in edema, tiredness, and shortness of breath.  THINGS TO DO: (1) Weigh yourself daily – keep a log for you cardiologist (2) Maintain a heart healthy diet and limit dietary sodium (3) Drink liquids as directed – you may need to limit the amount of liquid you drink to prevent fluid buildup (4) Maintain a healthy weight (5) Stay active with exercise  MONITOR THESE SIGNS AND SYMPTOMS: (1) Worsening shortness of breath at rest or at night (2) worsening leg swelling (3) Abdominal pain or swelling (4) Chest pain or palpitations (5) Weight gain of 5lbs or more in 1 week or 2-3lbs in 24hours. If you experience any of these, DO alert your primary care provider, or return to the Emergency Department if you feel very sick.  FOLLOW UP: Cardiologist (Dr. Rasmussen) after discharge in 1 to 2 weeks for further management.      SECONDARY DISCHARGE DIAGNOSES  Diagnosis: Stage 2 chronic kidney disease  Assessment and Plan of Treatment: Follow up with your primary care provider after discharge, obtain repeat labs in 3 to 5 days after discharge. Creatinine on discharge is 1.3    Diagnosis: CAD (coronary artery disease)  Assessment and Plan of Treatment: Continue aspirin and atorvastatin (new medication, sent to pharmacy)     PRINCIPAL DISCHARGE DIAGNOSIS  Diagnosis: Acute on chronic systolic congestive heart failure  Assessment and Plan of Treatment: WHAT IS HEART FAILURE? Heart failure (HF) is a condition in which the heart cannot pump enough blood to meet the body’s needs. The weakening of the heart’s pumping ability results in the buildup of fluid in the feet, ankles and legs resulting in edema, tiredness, and shortness of breath.  THINGS TO DO: (1) Weigh yourself daily – keep a log for you cardiologist (2) Maintain a heart healthy diet and limit dietary sodium (3) Drink liquids as directed – you may need to limit the amount of liquid you drink to prevent fluid buildup (4) Maintain a healthy weight (5) Stay active with exercise  MONITOR THESE SIGNS AND SYMPTOMS: (1) Worsening shortness of breath at rest or at night (2) worsening leg swelling (3) Abdominal pain or swelling (4) Chest pain or palpitations (5) Weight gain of 5lbs or more in 1 week or 2-3lbs in 24hours. If you experience any of these, DO alert your primary care provider, or return to the Emergency Department if you feel very sick.  FOLLOW UP: Cardiologist (Dr. Rosas) after discharge in 1 to 2 weeks for further management.      SECONDARY DISCHARGE DIAGNOSES  Diagnosis: Stage 2 chronic kidney disease  Assessment and Plan of Treatment: Follow up with your primary care provider after discharge, obtain repeat labs in 3 to 5 days after discharge. Creatinine on discharge is 1.3    Diagnosis: CAD (coronary artery disease)  Assessment and Plan of Treatment: Continue aspirin and atorvastatin (new medication, sent to pharmacy)

## 2021-11-01 NOTE — DISCHARGE NOTE PROVIDER - HOSPITAL COURSE
SEE FULL PROGRESS NOTE FROM 11/1/21:  ASSESSMENT AND PLAN:    61M hx  CAD, prior MI, NICM w HFrEF (15%), s/p AICD, HTN, p/w worsening SOB, found to have acute on chronic HFrEF in setting of dietary indiscretion.    #Acute on chronic HFrEF, hx NICM  -reports diet at shelter is high in salt, pt has limited options for fruit/veg. Alot of cold cut sandwiches  -tele  -on RA at rest, check pulse ox on RA prior to d/c  -trend daily weight, i/o  -BNP 15K on admission, improving  -lipids checked  -A1c 6.2 (pre DM)  -CXR and echo as above  -AICD interrogation as above  -on torsemide 20BID at home, giving Lasix 40iv BID here, changed back to home torsemide dose 20mg BID  -continue coreg, uptitrate as needed/tolerated  -off losartan, now on entresto to optimize CHF management, cost run $3    #NSVT  - no episodes today thus far. now on coreg 12.5mg BID  - need cardio f/u outpt.    #DERRICK, stable, suspect chronic component   - repeat labs w/ pcp in 3-5 days after d/c    #thrombocytopenia- borderline, trend    #CAD, hx MI, HTN  -coreg, Entresto, asa, statin    #nicotine dependence  -refusing patch    #Moderate protein-calorie malnutrition    #DVT ppx- SQH    #DNR/DNI, MOSLT done    #D/c shelter today.    Dispo: discharge to halfway in stable condition    Final diagnosis, treatment plan, and follow-up recommendations were discussed and explained to the patient. The patient was given an opportunity to ask questions concerning the diagnosis and treatment plan. The patient acknowledged understanding of the diagnosis, treatment, and follow-up recommendations. The patient was advised to seek urgent care upon discharge if worsening symptoms develop prior to scheduled follow-up. Time spent on discharge included time with the patient, and also coordinating discharge care as outlined below.    Total time spent: 50 min SEE FULL PROGRESS NOTE FROM 11/3/21:  ASSESSMENT AND PLAN:    61M hx  CAD, prior MI, NICM w HFrEF (15%), s/p AICD, HTN, p/w worsening SOB, found to have acute on chronic HFrEF in setting of dietary indiscretion.    #Acute on chronic HFrEF, hx NICM  -reports diet at shelter is high in salt, pt has limited options for fruit/veg. Alot of cold cut sandwiches  -tele  -on RA at rest, pulse ox on RA stable amb.  -trend daily weight, i/o  -BNP 15K on admission, improving  -lipids checked  -A1c 6.2 (pre DM)  -CXR and echo as above  -AICD interrogation as above  -on torsemide 20BID at home, giving Lasix 40iv BID here, changed back to home torsemide dose today  -continue coreg, uptitrate as needed/tolerated  -off losartan, now on entresto to optimize CHF management, cost run $3  **REFUSING ENTRESTO DUE TO GI S/E WILL PLACE BACK ON LOSARTAN ADVISED FOR CARDIO F/U OUTPATIENT W/ PRIMARY CARDIOLOGIST*    #NSVT  - no episodes today thus far. now on coreg 12.5mg BID  - need cardio f/u outpt.    #DERRICK   -Cr improving, trend w diuresis    #thrombocytopenia- borderline, trend    #CAD, hx MI, HTN  -coreg, entresto, asa, statin (refusing, advised importance of taking)    #nicotine dependence  -refusing patch    #Moderate protein-calorie malnutrition    #DVT ppx- SQH    #DNR/DNI, MOSLT done    #D/c shelter    ~Thursday 11/4/2021  9a.m; Dr Bella Rasmussen-[facility] [address] [place]; [phone]    [facility]    Final diagnosis, treatment plan, and follow-up recommendations were discussed and explained to the patient. The patient was given an opportunity to ask questions concerning the diagnosis and treatment plan. The patient acknowledged understanding of the diagnosis, treatment, and follow-up recommendations. The patient was advised to seek urgent care upon discharge if worsening symptoms develop prior to scheduled follow-up. Time spent on discharge included time with the patient, and also coordinating discharge care as outlined below.    Total time spent: 50 min SEE FULL PROGRESS NOTE FROM 11/3/21:  ASSESSMENT AND PLAN:    61M hx  CAD, prior MI, NICM w HFrEF (15%), s/p AICD, HTN, p/w worsening SOB, found to have acute on chronic HFrEF in setting of dietary indiscretion.    #Acute on chronic HFrEF, hx NICM  -reports diet at shelter is high in salt, pt has limited options for fruit/veg. Alot of cold cut sandwiches  -tele  -on RA at rest, pulse ox on RA stable amb.  -trend daily weight, i/o  -BNP 15K on admission, improving  -lipids checked  -A1c 6.2 (pre DM)  -CXR and echo as above  -AICD interrogation as above  -on torsemide 20BID at home, giving Lasix 40iv BID here, changed back to home torsemide dose today  -continue coreg, uptitrate as needed/tolerated  -off losartan, now on entresto to optimize CHF management, cost run $3  **REFUSING ENTRESTO DUE TO GI S/E WILL PLACE BACK ON LOSARTAN ADVISED FOR CARDIO F/U OUTPATIENT W/ PRIMARY CARDIOLOGIST (Dr. Rosas)*    #NSVT  - no episodes today thus far. now on coreg 12.5mg BID  - need cardio f/u outpt.    #DERRICK   -Cr improving, trend w diuresis    #thrombocytopenia- borderline, trend    #CAD, hx MI, HTN  -coreg, entresto, asa, statin (refusing, advised importance of taking)    #nicotine dependence  -refusing patch    #Moderate protein-calorie malnutrition    #DVT ppx- SQH    #DNR/DNI, MOSLT done    #D/c shelter    ~Thursday 11/4/2021  9a.m; Dr Bella Rasmussen-[facility] [address] [place]; [phone]    Dispo: discharge to [facility] in stable condition    Final diagnosis, treatment plan, and follow-up recommendations were discussed and explained to the patient. The patient was given an opportunity to ask questions concerning the diagnosis and treatment plan. The patient acknowledged understanding of the diagnosis, treatment, and follow-up recommendations. The patient was advised to seek urgent care upon discharge if worsening symptoms develop prior to scheduled follow-up. Time spent on discharge included time with the patient, and also coordinating discharge care as outlined below.    Total time spent: 50 min

## 2021-11-01 NOTE — DISCHARGE NOTE NURSING/CASE MANAGEMENT/SOCIAL WORK - NSDCVIVACCINE_GEN_ALL_CORE_FT
influenza, injectable, quadrivalent, preservative free; 27-Nov-2016 15:07; Tala Wynne (RN); [employer]; [id number]; IntraMuscular; Deltoid Right.; 0.5 milliLiter(s); VIS (VIS Published: 07-Aug-2015, VIS Presented: 27-Nov-2016);

## 2021-11-01 NOTE — DISCHARGE NOTE PROVIDER - NSDCFUADDAPPT_GEN_ALL_CORE_FT
APPOINTMENT: Thursday 11/4/2021  9a.m; Dr Bella Rasmussen-[place] [address] [facility]; [phone]  Appointment made for monday 11/08/21 at 12 noon with dr. cristino hanson. APPOINTMENT: Thursday 11/4/2021  9a.m; Dr Bella Rasmussen-[place] [address] [facility]; [phone]  Appointment made for Monday 11/08/21 at 12 noon with Dr. Asia Gaviria

## 2021-11-01 NOTE — DISCHARGE NOTE NURSING/CASE MANAGEMENT/SOCIAL WORK - PATIENT PORTAL LINK FT
You can access the FollowMyHealth Patient Portal offered by [facility] by registering at the following website: [email or web address]. By joining [facility]’s FollowMyHealth portal, you will also be able to view your health information using other applications (apps) compatible with our system.

## 2021-11-01 NOTE — PROGRESS NOTE ADULT - SUBJECTIVE AND OBJECTIVE BOX
NP HOSPITALIST PROGRESS NOTE    Chart and meds reviewed.  Patient seen and examined.    CC: SOB    Subjective: Resting comfortably today. no issues. d/c home today - advised cardio f/u    All other systems reviewed and found to be negative with the exception of what has been described above.    MEDICATIONS  (STANDING):  aspirin enteric coated 81 milliGRAM(s) Oral daily  atorvastatin 40 milliGRAM(s) Oral at bedtime  carvedilol 12.5 milliGRAM(s) Oral every 12 hours  heparin   Injectable 5000 Unit(s) SubCutaneous every 12 hours  nicotine - 21 mG/24Hr(s) Patch 1 patch Transdermal daily  sacubitril 24 mG/valsartan 26 mG 1 Tablet(s) Oral two times a day  torsemide 20 milliGRAM(s) Oral two times a day    MEDICATIONS  (PRN):  melatonin 5 milliGRAM(s) Oral at bedtime PRN Insomnia  senna 2 Tablet(s) Oral at bedtime PRN Constipation    Vital Signs Last 24 Hrs  T(C): 36.4 (01 Nov 2021 08:11), Max: 36.8 (01 Nov 2021 00:04)  T(F): 97.5 (01 Nov 2021 08:11), Max: 98.3 (01 Nov 2021 00:04)  HR: 74 (01 Nov 2021 08:11) (74 - 91)  BP: 102/75 (01 Nov 2021 08:11) (99/72 - 114/79)  BP(mean): --  RR: 18 (01 Nov 2021 08:11) (18 - 18)  SpO2: 98% (01 Nov 2021 08:11) (94% - 98%)    PHYSICAL EXAM:  Gen: NAD  HEENT:  pupils equal and reactive, EOMI, no oropharyngeal lesions, erythema, exudates, oral thrush  NECK:   supple, no carotid bruits, no palpable lymph nodes, no thyromegaly  CV:  +S1, +S2, regular, no murmurs or rubs  RESP:   lungs clear to auscultation bilaterally, no wheezing, rales, rhonchi, good air entry bilaterally, scattered crackles b/l  BREAST:  not examined  GI:  abdomen soft, non-tender, non-distended, normal BS, no bruits, no abdominal masses, no palpable masses  RECTAL:  not examined  :  not examined  MSK:   normal muscle tone, no atrophy, no rigidity, no contractions  EXT:  no clubbing, no cyanosis, no edema, no calf pain, swelling or erythema  VASCULAR:  pulses equal and symmetric in the upper and lower extremities  NEURO:  AAOX3, no focal neurological deficits, follows all commands, able to move extremities spontaneously  SKIN:  no ulcers, lesions or rashes    LABS: All Labs Reviewed:    11-01    140  |  105  |  38<H>  ----------------------------<  87  4.4   |  29  |  1.37<H>    Ca    8.2<L>      01 Nov 2021 07:27  Mg     2.2     11-01    CULTURES:  no new    Additional results/Imaging, I have personally reviewed:  CXR 10/27/21: Heart enlargement defibrillator. Right base pleuropulmonary reaction.    Echo 10/28/21: The left ventricle cavity is severely dilated. Left ventricle systolic function appears severely and globally impaired; segmental wall motion abnormalities noted. Estimated EjectionFraction is 16 %. The left atrium is mildly dilated. The right atrium appears moderately dilated. A device wire is seen in the RV and RA. Normal appearing right ventricle structure and function. A device wire is seen in the RV and RA. Fibrocalcific changes noted to the Aortic valve leaflets with preserved leaflet excursion. Fibrocalcific changes noted to the mitral valve leaflets with preserved leaflet excursion. EA reversal of the mitral inflow consistent with reduced compliance of the left ventricle. Moderate to severe (3+) mitral regurgitation is present. Normal appearing tricuspid valve structure. Moderate to severe (3+) tricuspid valve regurgitation is present. Moderate pulmonary hypertension. No evidence of pericardial effusion. IVC is dilated and not collapsing with inspiration.    AICD interrogation 10/28/21:  Episodes: episodes of NSVT, most recent on 10/19/21, no ICD therapies delivered.   Changes made: None  Conclusion: Normal device function.    Telemetry, personally reviewed:  10/29/21: sinus 90-110s, APCs  10/30/21: NSR, NSVT  10/31/21: sinus 70-90, NSVT, SVT earlier in stay   NP HOSPITALIST PROGRESS NOTE    Chart and meds reviewed.  Patient seen and examined.    CC: SOB    Subjective: Resting comfortably today. no issues. d/c home today - advised cardio f/u    All other systems reviewed and found to be negative with the exception of what has been described above.    MEDICATIONS  (STANDING):  aspirin enteric coated 81 milliGRAM(s) Oral daily  atorvastatin 40 milliGRAM(s) Oral at bedtime  carvedilol 12.5 milliGRAM(s) Oral every 12 hours  heparin   Injectable 5000 Unit(s) SubCutaneous every 12 hours  nicotine - 21 mG/24Hr(s) Patch 1 patch Transdermal daily  sacubitril 24 mG/valsartan 26 mG 1 Tablet(s) Oral two times a day  torsemide 20 milliGRAM(s) Oral two times a day    MEDICATIONS  (PRN):  melatonin 5 milliGRAM(s) Oral at bedtime PRN Insomnia  senna 2 Tablet(s) Oral at bedtime PRN Constipation    Vital Signs Last 24 Hrs  T(C): 36.4 (01 Nov 2021 08:11), Max: 36.8 (01 Nov 2021 00:04)  T(F): 97.5 (01 Nov 2021 08:11), Max: 98.3 (01 Nov 2021 00:04)  HR: 74 (01 Nov 2021 08:11) (74 - 91)  BP: 102/75 (01 Nov 2021 08:11) (99/72 - 114/79)  BP(mean): --  RR: 18 (01 Nov 2021 08:11) (18 - 18)  SpO2: 98% (01 Nov 2021 08:11) (94% - 98%)    PHYSICAL EXAM:  Gen: NAD  HEENT:  pupils equal and reactive, EOMI, no oropharyngeal lesions, erythema, exudates, oral thrush  NECK:   supple, no carotid bruits, no palpable lymph nodes, no thyromegaly  CV:  +S1, +S2, regular, no murmurs or rubs  RESP:   lungs clear to auscultation bilaterally, no wheezing, rales, rhonchi, good air entry bilaterally, scattered crackles b/l  BREAST:  not examined  GI:  abdomen soft, non-tender, non-distended, normal BS, no bruits, no abdominal masses, no palpable masses  RECTAL:  not examined  :  not examined  MSK:   normal muscle tone, no atrophy, no rigidity, no contractions  EXT:  no clubbing, no cyanosis, no edema, no calf pain, swelling or erythema  VASCULAR:  pulses equal and symmetric in the upper and lower extremities  NEURO:  AAOX3, no focal neurological deficits, follows all commands, able to move extremities spontaneously  SKIN:  no ulcers, lesions or rashes    LABS: All Labs Reviewed:    11-01    140  |  105  |  38<H>  ----------------------------<  87  4.4   |  29  |  1.37<H>    Ca    8.2<L>      01 Nov 2021 07:27  Mg     2.2     11-01    CULTURES:  no new    Additional results/Imaging, I have personally reviewed:  CXR 10/27/21: Heart enlargement defibrillator. Right base pleuropulmonary reaction.    Echo 10/28/21: The left ventricle cavity is severely dilated. Left ventricle systolic function appears severely and globally impaired; segmental wall motion abnormalities noted. Estimated EjectionFraction is 16 %. The left atrium is mildly dilated. The right atrium appears moderately dilated. A device wire is seen in the RV and RA. Normal appearing right ventricle structure and function. A device wire is seen in the RV and RA. Fibrocalcific changes noted to the Aortic valve leaflets with preserved leaflet excursion. Fibrocalcific changes noted to the mitral valve leaflets with preserved leaflet excursion. EA reversal of the mitral inflow consistent with reduced compliance of the left ventricle. Moderate to severe (3+) mitral regurgitation is present. Normal appearing tricuspid valve structure. Moderate to severe (3+) tricuspid valve regurgitation is present. Moderate pulmonary hypertension. No evidence of pericardial effusion. IVC is dilated and not collapsing with inspiration.    AICD interrogation 10/28/21:  Episodes: episodes of NSVT, most recent on 10/19/21, no ICD therapies delivered.   Changes made: None  Conclusion: Normal device function.    Telemetry, personally reviewed:  10/29/21: sinus 90-110s, APCs  10/30/21: NSR, NSVT  10/31/21: sinus 70-90, NSVT, SVT earlier in stay  11/1/21: no events, nsr

## 2021-11-01 NOTE — DISCHARGE NOTE PROVIDER - CARE PROVIDER_API CALL
Asia Gaviria (DO)  Cardiovascular Disease; Internal Medicine  [address], [address]  [place] [address]  Phone: [phone]  Fax: [phone]  Follow Up Time:    Asia Gaviria ()  Cardiovascular Disease; Internal Medicine  [address], [address]  [place] [address]  Phone: [phone]  Fax: [phone]  Follow Up Time:     Sanya Rosas)  Cardiovascular Disease  [address] [place] [address]  Phone: [phone]  Fax: [phone]  Follow Up Time:

## 2021-11-01 NOTE — PROGRESS NOTE ADULT - ASSESSMENT
61M hx  CAD, prior MI, NICM w HFrEF (15%), s/p AICD, HTN, p/w worsening SOB, found to have acute on chronic HFrEF in setting of dietary indiscretion.    #Acute on chronic HFrEF, hx NICM  -reports diet at shelter is high in salt, pt has limited options for fruit/veg. Alot of cold cut sandwiches  -tele  -on RA at rest, should ambulate for pulsox on RA prior to d/c  -trend daily weight, i/o  -BNP 15K on admission, improving  -lipids checked  -A1c 6.2 (pre DM)  -CXR and echo as above  -AICD interrogation as above  -on torsemide 20BID at home, giving Lasix 40iv BID here, changed back to home torsemide dose today  -continue coreg, uptitrate as needed/tolerated  -off losartan, now on entresto to optimize CHF management, cost run $3    #NSVT  -uptitrated coreg again today    #DERRICK   -Cr improving, trend w diuresis    #thrombocytopenia- borderline, trend    #CAD, hx MI, HTN  -coreg  -start entresto  -continue ASA  -started statin    #nicotine dependence  -refusing patch    #Moderate protein-calorie malnutrition    #DVT ppx- SQH    #DNR/DNI, MOSLT done    #from shelter, anticipate return on 11/1   61M hx  CAD, prior MI, NICM w HFrEF (15%), s/p AICD, HTN, p/w worsening SOB, found to have acute on chronic HFrEF in setting of dietary indiscretion.    #Acute on chronic HFrEF, hx NICM  -reports diet at shelter is high in salt, pt has limited options for fruit/veg. Alot of cold cut sandwiches  -tele  -on RA at rest, should ambulate for pulse ox on RA prior to d/c  -trend daily weight, i/o  -BNP 15K on admission, improving  -lipids checked  -A1c 6.2 (pre DM)  -CXR and echo as above  -AICD interrogation as above  -on torsemide 20BID at home, giving Lasix 40iv BID here, changed back to home torsemide dose today  -continue coreg, uptitrate as needed/tolerated  -off losartan, now on entresto to optimize CHF management, cost run $3    #NSVT  - no episodes today thus far. now on coreg 12.5mg BID  - need cardio f/u outpt.    #DERRICK   -Cr improving, trend w diuresis    #thrombocytopenia- borderline, trend    #CAD, hx MI, HTN  -coreg, entresto, asa, statin    #nicotine dependence  -refusing patch    #Moderate protein-calorie malnutrition    #DVT ppx- SQH    #DNR/DNI, MOSLT done    #D/c shelter today.

## 2021-11-01 NOTE — DISCHARGE NOTE PROVIDER - PROVIDER TOKENS
PROVIDER:[TOKEN:[[id number]]] PROVIDER:[TOKEN:[[id number]]],PROVIDER:[TOKEN:[[id number]]]

## 2021-11-02 LAB
ALBUMIN SERPL ELPH-MCNC: 3.3 G/DL — SIGNIFICANT CHANGE UP (ref 3.3–5)
ALP SERPL-CCNC: 189 U/L — HIGH (ref 40–120)
ALT FLD-CCNC: 30 U/L — SIGNIFICANT CHANGE UP (ref 12–78)
ANION GAP SERPL CALC-SCNC: 8 MMOL/L — SIGNIFICANT CHANGE UP (ref 5–17)
AST SERPL-CCNC: 33 U/L — SIGNIFICANT CHANGE UP (ref 15–37)
BILIRUB SERPL-MCNC: 0.8 MG/DL — SIGNIFICANT CHANGE UP (ref 0.2–1.2)
BUN SERPL-MCNC: 39 MG/DL — HIGH (ref 7–23)
CALCIUM SERPL-MCNC: 8.4 MG/DL — LOW (ref 8.5–10.1)
CHLORIDE SERPL-SCNC: 105 MMOL/L — SIGNIFICANT CHANGE UP (ref 96–108)
CO2 SERPL-SCNC: 31 MMOL/L — SIGNIFICANT CHANGE UP (ref 22–31)
CREAT SERPL-MCNC: 1.3 MG/DL — SIGNIFICANT CHANGE UP (ref 0.5–1.3)
GLUCOSE SERPL-MCNC: 94 MG/DL — SIGNIFICANT CHANGE UP (ref 70–99)
HCT VFR BLD CALC: 45.4 % — SIGNIFICANT CHANGE UP (ref 39–50)
HGB BLD-MCNC: 14.6 G/DL — SIGNIFICANT CHANGE UP (ref 13–17)
MCHC RBC-ENTMCNC: 30.2 PG — SIGNIFICANT CHANGE UP (ref 27–34)
MCHC RBC-ENTMCNC: 32.2 GM/DL — SIGNIFICANT CHANGE UP (ref 32–36)
MCV RBC AUTO: 94 FL — SIGNIFICANT CHANGE UP (ref 80–100)
PLATELET # BLD AUTO: 180 K/UL — SIGNIFICANT CHANGE UP (ref 150–400)
POTASSIUM SERPL-MCNC: 4.2 MMOL/L — SIGNIFICANT CHANGE UP (ref 3.5–5.3)
POTASSIUM SERPL-SCNC: 4.2 MMOL/L — SIGNIFICANT CHANGE UP (ref 3.5–5.3)
PROT SERPL-MCNC: 6.4 GM/DL — SIGNIFICANT CHANGE UP (ref 6–8.3)
RBC # BLD: 4.83 M/UL — SIGNIFICANT CHANGE UP (ref 4.2–5.8)
RBC # FLD: 16.1 % — HIGH (ref 10.3–14.5)
SODIUM SERPL-SCNC: 144 MMOL/L — SIGNIFICANT CHANGE UP (ref 135–145)
WBC # BLD: 3.73 K/UL — LOW (ref 3.8–10.5)
WBC # FLD AUTO: 3.73 K/UL — LOW (ref 3.8–10.5)

## 2021-11-02 PROCEDURE — 99231 SBSQ HOSP IP/OBS SF/LOW 25: CPT

## 2021-11-02 RX ADMIN — Medication 81 MILLIGRAM(S): at 10:45

## 2021-11-02 RX ADMIN — SACUBITRIL AND VALSARTAN 1 TABLET(S): 24; 26 TABLET, FILM COATED ORAL at 10:47

## 2021-11-02 RX ADMIN — Medication 20 MILLIGRAM(S): at 10:45

## 2021-11-02 RX ADMIN — CARVEDILOL PHOSPHATE 12.5 MILLIGRAM(S): 80 CAPSULE, EXTENDED RELEASE ORAL at 10:45

## 2021-11-02 NOTE — PROGRESS NOTE ADULT - SUBJECTIVE AND OBJECTIVE BOX
NP HOSPITALIST PROGRESS NOTE    Chart and meds reviewed.  Patient seen and examined.    CC: SOB    Subjective: Resting comfortably today. no issues. d/c when shelter available     All other systems reviewed and found to be negative with the exception of what has been described above.    MEDICATIONS  (STANDING):  aspirin enteric coated 81 milliGRAM(s) Oral daily  atorvastatin 40 milliGRAM(s) Oral at bedtime  carvedilol 12.5 milliGRAM(s) Oral every 12 hours  heparin   Injectable 5000 Unit(s) SubCutaneous every 12 hours  nicotine - 21 mG/24Hr(s) Patch 1 patch Transdermal daily  sacubitril 24 mG/valsartan 26 mG 1 Tablet(s) Oral two times a day  torsemide 20 milliGRAM(s) Oral two times a day    MEDICATIONS  (PRN):  melatonin 5 milliGRAM(s) Oral at bedtime PRN Insomnia  senna 2 Tablet(s) Oral at bedtime PRN Constipation    Home Medications:  carvedilol 3.125 mg oral tablet: 1 tab(s) orally 2 times a day (27 Oct 2021 13:50)  losartan 25 mg oral tablet: 1 tab(s) orally once a day (27 Oct 2021 13:50)  Moderna COVID-19 Vaccine  mcg/0.5 mL intramuscular suspension: 0.5 milliliter(s) intramuscular once  ***2nd dose 10/17*** (27 Oct 2021 13:50)  torsemide 20 mg oral tablet: 1 tab(s) orally 2 times a day (27 Oct 2021 13:50)    Vital Signs Last 24 Hrs  T(C): 36.6 (02 Nov 2021 08:33), Max: 36.6 (01 Nov 2021 20:07)  T(F): 97.8 (02 Nov 2021 08:33), Max: 97.9 (01 Nov 2021 20:07)  HR: 75 (02 Nov 2021 08:33) (75 - 78)  BP: 119/79 (02 Nov 2021 08:33) (103/76 - 119/79)  BP(mean): --  RR: 18 (02 Nov 2021 08:33) (18 - 19)  SpO2: 95% (02 Nov 2021 08:33) (95% - 99%)    PHYSICAL EXAM:  Gen: NAD  HEENT:  pupils equal and reactive, EOMI, no oropharyngeal lesions, erythema, exudates, oral thrush  NECK:   supple, no carotid bruits, no palpable lymph nodes, no thyromegaly  CV:  +S1, +S2, regular, no murmurs or rubs  RESP:   lungs clear to auscultation bilaterally, no wheezing, rales, rhonchi, good air entry bilaterally, scattered crackles b/l  BREAST:  not examined  GI:  abdomen soft, non-tender, non-distended, normal BS, no bruits, no abdominal masses, no palpable masses  RECTAL:  not examined  :  not examined  MSK:   normal muscle tone, no atrophy, no rigidity, no contractions  EXT:  no clubbing, no cyanosis, no edema, no calf pain, swelling or erythema  VASCULAR:  pulses equal and symmetric in the upper and lower extremities  NEURO:  AAOX3, no focal neurological deficits, follows all commands, able to move extremities spontaneously  SKIN:  no ulcers, lesions or rashes    LABS: All Labs Reviewed:                        14.6   3.73  )-----------( 180      ( 02 Nov 2021 06:39 )             45.4     11-02    144  |  105  |  39<H>  ----------------------------<  94  4.2   |  31  |  1.30    Ca    8.4<L>      02 Nov 2021 06:39  Mg     2.2     11-01    TPro  6.4  /  Alb  3.3  /  TBili  0.8  /  DBili  x   /  AST  33  /  ALT  30  /  AlkPhos  189<H>  11-02    CULTURES:  no new    Additional results/Imaging, I have personally reviewed:  CXR 10/27/21: Heart enlargement defibrillator. Right base pleuropulmonary reaction.    Echo 10/28/21: The left ventricle cavity is severely dilated. Left ventricle systolic function appears severely and globally impaired; segmental wall motion abnormalities noted. Estimated EjectionFraction is 16 %. The left atrium is mildly dilated. The right atrium appears moderately dilated. A device wire is seen in the RV and RA. Normal appearing right ventricle structure and function. A device wire is seen in the RV and RA. Fibrocalcific changes noted to the Aortic valve leaflets with preserved leaflet excursion. Fibrocalcific changes noted to the mitral valve leaflets with preserved leaflet excursion. EA reversal of the mitral inflow consistent with reduced compliance of the left ventricle. Moderate to severe (3+) mitral regurgitation is present. Normal appearing tricuspid valve structure. Moderate to severe (3+) tricuspid valve regurgitation is present. Moderate pulmonary hypertension. No evidence of pericardial effusion. IVC is dilated and not collapsing with inspiration.    AICD interrogation 10/28/21:  Episodes: episodes of NSVT, most recent on 10/19/21, no ICD therapies delivered.   Changes made: None  Conclusion: Normal device function.    Telemetry, personally reviewed:  10/29/21: sinus 90-110s, APCs  10/30/21: NSR, NSVT  10/31/21: sinus 70-90, NSVT, SVT earlier in stay  11/1/21: no events, nsr

## 2021-11-02 NOTE — PROGRESS NOTE ADULT - ASSESSMENT
61M hx  CAD, prior MI, NICM w HFrEF (15%), s/p AICD, HTN, p/w worsening SOB, found to have acute on chronic HFrEF in setting of dietary indiscretion.    #Acute on chronic HFrEF, hx NICM  -reports diet at shelter is high in salt, pt has limited options for fruit/veg. Alot of cold cut sandwiches  -tele  -on RA at rest, check pulse ox on RA prior to d/c  -trend daily weight, i/o  -BNP 15K on admission, improving  -lipids checked  -A1c 6.2 (pre DM)  -CXR and echo as above  -AICD interrogation as above  -on torsemide 20BID at home, giving Lasix 40iv BID here, changed back to home torsemide dose today  -continue coreg, uptitrate as needed/tolerated  -off losartan, now on entresto to optimize CHF management, cost run $3    #NSVT  - no episodes today thus far. now on coreg 12.5mg BID  - need cardio f/u outpt.    #DERRICK   -Cr improving, trend w diuresis    #thrombocytopenia- borderline, trend    #CAD, hx MI, HTN  -coreg, entresto, asa, statin    #nicotine dependence  -refusing patch    #Moderate protein-calorie malnutrition    #DVT ppx- SQH    #DNR/DNI, MOSLT done    #D/c shelter when available

## 2021-11-02 NOTE — PROGRESS NOTE ADULT - SUBJECTIVE AND OBJECTIVE BOX
CHIEF COMPLAINT:  Patient is a 61y old  Male who presents with a chief complaint of Shortness of breath    HPI:  60 y/o male with a PMHx of CAD, Nonischemic Cardiomyopathy - CHF (EF in 2016 15-20%) s/p ICD (reports placed about 5 months ago by Dr. hSaikh), nonobstructive CAD, HTN presents to the ED c/o worsening SOB. His symptoms progressed over the last few days. Pt unable to walk short distances without feeling SOB, +orthopnea, +productive cough and abd bloating. Pt lives at the shelter where they have a high sodium diet. No chest pain, palp, fever, Gi issues, urinary issues.  Cardio - [facility]     Patient dx with CHF and started on lasix and admitted to tele.    10/29: Patient's nausea is resolving and his SOB is improving but not at baseline. He still has some abd bloating.  10/30: patient continues to improve. He has some episodes of asymptomatic NSVT on tele.  10/31: SOB and abd bloating continue to improve. Had some asymptomatic NSVT  11/1: 8 beats of asymptomatic NSVT, continues to feel better each day.  11/2: No acute events O/N. Waiting for shelter placement, off tele. He is feeling well, rare palpitations.      He denies fevers, chills, CP, dizziness, syncope, orthopnea, PND.    PMHx:  PAST MEDICAL & SURGICAL HISTORY:  Heart failure, systolic s/p ICD    CAD (coronary artery disease)    Hypertension    Nonischemic cardiomyopathy    Social History:  Living Situation: Shelter in [place]  Tobacco Use: Cigarettes, 3 per days x 35 years - used to be more  Alcohol Use: occasionally  Drug Use: THC occasionally    COVID VACCINE > Moderna x1, will get second dose in 2 weeks      FAMILY HISTORY:   FAMILY HISTORY:  No pertinent family history in first degree relatives    ALLERGIES:  Allergies  No Known Allergies    Intolerances  pork (Other)    REVIEW OF SYSTEMS:  10 system ROS was obtained, all pertinent positives and negatives are in HPI otherwise they were negative.    Vital Signs Last 24 Hrs  T(C): 36.6 (01 Nov 2021 20:07), Max: 36.6 (01 Nov 2021 20:07)  T(F): 97.9 (01 Nov 2021 20:07), Max: 97.9 (01 Nov 2021 20:07)  HR: 78 (01 Nov 2021 20:07) (78 - 78)  BP: 103/76 (01 Nov 2021 20:07) (103/76 - 103/76)  BP(mean): --  RR: 19 (01 Nov 2021 20:07) (19 - 19)  SpO2: 99% (01 Nov 2021 20:07) (99% - 99%)    PHYSICAL EXAM:   Constitutional: awake and alert in NAD  HEENT: EOMI, Normal Hearing, MMM  Neck: Soft and supple, No LAD, + JVD, no carotid bruit  Respiratory: Breath sounds are clear bilaterally, No wheezing, rales or rhonchi, good air movement  Cardiovascular: S1 and S2, regular rate and rhythm, III/VI systolic murmur at LSB and apex  Gastrointestinal: Decreased BS on RLL  Extremities: Warm and well perfused, no peripheral edema  Neurological: A/O x 3, no focal deficits appreciated  Skin: No rashes appreciated    MEDICATIONS  (STANDING):  aspirin enteric coated 81 milliGRAM(s) Oral daily  atorvastatin 40 milliGRAM(s) Oral at bedtime  carvedilol 12.5 milliGRAM(s) Oral every 12 hours  heparin   Injectable 5000 Unit(s) SubCutaneous every 12 hours  nicotine - 21 mG/24Hr(s) Patch 1 patch Transdermal daily  sacubitril 24 mG/valsartan 26 mG 1 Tablet(s) Oral two times a day  torsemide 20 milliGRAM(s) Oral two times a day    MEDICATIONS  (PRN):  melatonin 5 milliGRAM(s) Oral at bedtime PRN Insomnia  senna 2 Tablet(s) Oral at bedtime PRN Constipation    LABS: All Labs Reviewed:                         14.6   3.73  )-----------( 180      ( 02 Nov 2021 06:39 )             45.4                                   14.4   3.76  )-----------( 146      ( 29 Oct 2021 07:43 )             45.3                       14.1   3.78  )-----------( 148      ( 28 Oct 2021 06:58 )             43.4     11-02    144  |  105  |  39<H>  ----------------------------<  94  4.2   |  31  |  1.30    Ca    8.4<L>      02 Nov 2021 06:39  Mg     2.2     11-01    TPro  6.4  /  Alb  3.3  /  TBili  0.8  /  DBili  x   /  AST  33  /  ALT  30  /  AlkPhos  189<H>  11-02 11-01    140  |  105  |  38<H>  ----------------------------<  87  4.4   |  29  |  1.37<H>    Ca    8.2<L>      01 Nov 2021 07:27  Mg     2.2     11-01      10-31    141  |  105  |  33<H>  ----------------------------<  95  4.3   |  30  |  1.17    Ca    8.4<L>      31 Oct 2021 06:40  Mg     2.3     10-31      10-30    140  |  106  |  31<H>  ----------------------------<  107<H>  4.1   |  29  |  1.23    Ca    8.3<L>      30 Oct 2021 07:20  Mg     2.2     10-29      10-29    140  |  107  |  26<H>  ----------------------------<  120<H>  4.1   |  28  |  1.16    Ca    8.4<L>      29 Oct 2021 07:43  Phos  3.4     10-27  Mg     2.2     10-29    TPro  6.9  /  Alb  3.6  /  TBili  1.3<H>  /  DBili  x   /  AST  39<H>  /  ALT  34  /  AlkPhos  194<H>  10-27    10-28    140  |  107  |  28<H>  ----------------------------<  90  4.3   |  28  |  1.20    Ca    8.4<L>      28 Oct 2021 06:58  Phos  3.4     10-27  Mg     2.3     10-28    TPro  6.9  /  Alb  3.6  /  TBili  1.3<H>  /  DBili  x   /  AST  39<H>  /  ALT  34  /  AlkPhos  194<H>  10-27    Serum Pro-Brain Natriuretic Peptide: 22742 pg/mL (10-27 @ 11:07)    RADIOLOGY:    Reviewed in EMR    EKG:    10/27/21, my interpretation: NSR at 92bpm, LAE, LAD, PRWP    TELEMETRY:    Off tele    ECHO:    EXAM:  ECHO TTE WO CON COMP W DOPP    PROCEDURE DATE:  10/28/2021      M-Mode Measurements (cm)     LVEDd: 7.38 cm            LVESd: 6.75 cm   IVSEd: 0.7 cm   LVPWd: 0.77 cm            AO Root Dimension: 3.4 cm                ACS: 2.2 cm                             LA: 4.7 cm    Doppler Measurements:     AV Velocity:59.7 cm/s              MV Peak E-Wave: 24.7 cm/s   AV Peak Gradient: 1.43 mmHg        MV Peak A-Wave: 40 cm/s    MV E/A Ratio: 0.62 %   TR Velocity:312 cm/s               MV Peak Gradient: 0.24 mmHg   TR Gradient:38.9376 mmHg   Estimated RAP:15 mmHg   RVSP:53 mmHg     Summary     The left ventricle cavity is severely dilated.   Left ventricle systolic function appears severely and globally impaired;   segmental wall motion abnormalities noted.   Estimated EjectionFraction is 16 %.   The left atrium is mildly dilated.   The right atrium appears moderately dilated.   A device wire is seen in the RV and RA.   Normal appearing right ventricle structure and function.   A device wire is seen in the RV and RA.   Fibrocalcific changes noted to the Aortic valve leaflets with preserved   leaflet excursion.   Fibrocalcific changes noted to the mitral valve leaflets with preserved   leaflet excursion.   EA reversal of the mitral inflow consistent with reduced compliance of the   left ventricle.   Moderate to severe (3+) mitral regurgitation is present.   Normal appearing tricuspid valve structure.   Moderate to severe (3+) tricuspid valve regurgitation is present.   Moderate pulmonary hypertension.   No evidence of pericardial effusion.   IVC is dilated and not collapsing with inspiration.

## 2021-11-02 NOTE — PROGRESS NOTE ADULT - ASSESSMENT
62 y/o male with a PMHx of CAD, Nonischemic Cardiomyopathy - CHF (EF in 2016 15-20%) s/p ICD (reports placed about 5 months ago by Dr. Shaikh), nonobstructive CAD, HTN presents to the ED c/o worsening SOB 2/2 acute on chronic systolic congestive heart failure exacerbation 2/2 dietary noncompliance    -Patient continues to improve and remains stable from a cardiac standpoint.  -Ready for D/C but awaiting bed from shelter. Should F/U with his outside cardiologist in the next 1-2 weeks.  -C/W CM medications. Patient's BP is on lower side and he also refuses any other medications to be added.  -C/W low salt diet.    Thank you for allowing me to partake in the care of this patient. I will continue to follow with you intermittently/PRN. If you have any cardiac questions or concerns please do not hesitate to call me or my service.

## 2021-11-03 VITALS
SYSTOLIC BLOOD PRESSURE: 109 MMHG | TEMPERATURE: 98 F | HEART RATE: 85 BPM | OXYGEN SATURATION: 100 % | DIASTOLIC BLOOD PRESSURE: 73 MMHG | RESPIRATION RATE: 18 BRPM

## 2021-11-03 PROCEDURE — 99238 HOSP IP/OBS DSCHRG MGMT 30/<: CPT

## 2021-11-03 RX ORDER — NICOTINE POLACRILEX 2 MG
1 GUM BUCCAL
Qty: 30 | Refills: 0
Start: 2021-11-03 | End: 2021-12-02

## 2021-11-03 RX ORDER — CARVEDILOL PHOSPHATE 80 MG/1
1 CAPSULE, EXTENDED RELEASE ORAL
Qty: 60 | Refills: 0
Start: 2021-11-03 | End: 2021-12-02

## 2021-11-03 RX ORDER — CARVEDILOL PHOSPHATE 80 MG/1
1 CAPSULE, EXTENDED RELEASE ORAL
Qty: 0 | Refills: 0 | DISCHARGE

## 2021-11-03 RX ORDER — CX-024414 0.2 MG/ML
0.5 INJECTION, SUSPENSION INTRAMUSCULAR
Qty: 0 | Refills: 0 | DISCHARGE

## 2021-11-03 RX ORDER — LOSARTAN POTASSIUM 100 MG/1
25 TABLET, FILM COATED ORAL DAILY
Refills: 0 | Status: DISCONTINUED | OUTPATIENT
Start: 2021-11-03 | End: 2021-11-03

## 2021-11-03 RX ORDER — ATORVASTATIN CALCIUM 80 MG/1
1 TABLET, FILM COATED ORAL
Qty: 30 | Refills: 0
Start: 2021-11-03 | End: 2021-12-02

## 2021-11-03 RX ADMIN — Medication 20 MILLIGRAM(S): at 09:25

## 2021-11-03 RX ADMIN — CARVEDILOL PHOSPHATE 12.5 MILLIGRAM(S): 80 CAPSULE, EXTENDED RELEASE ORAL at 09:24

## 2021-11-03 RX ADMIN — Medication 81 MILLIGRAM(S): at 09:30

## 2021-11-03 NOTE — PROVIDER CONTACT NOTE (OTHER) - SITUATION
I was told to called  for this consult.
8 beats of vtach on cardiac monitor
answering service aware of consult.
as per  he asked me to call  office for this consult
notified Dr Rasmussen's office of admission please fax over discharged paperwork to [phone] once pt is discharged

## 2021-11-03 NOTE — PROGRESS NOTE ADULT - NUTRITIONAL ASSESSMENT
This patient has been assessed with a concern for Malnutrition and has been determined to have a diagnosis/diagnoses of Moderate protein-calorie malnutrition.    This patient is being managed with:   Diet DASH/TLC-  Sodium & Cholesterol Restricted  Consistent Carbohydrate {Evening Snack} (CSTCHOSN)  1500mL Fluid Restriction ([id number])  Supplement Feeding Modality:  Oral  Ensure Enlive Cans or Servings Per Day:  1       Frequency:  Two Times a day  Entered: Oct 28 2021  2:52PM    
This patient has been assessed with a concern for Malnutrition and has been determined to have a diagnosis/diagnoses of Moderate protein-calorie malnutrition.    This patient is being managed with:   Diet DASH/TLC-  Sodium & Cholesterol Restricted  Consistent Carbohydrate {Evening Snack} (CSTCHOSN)  1500mL Fluid Restriction ([id number])  Supplement Feeding Modality:  Oral  Ensure Enlive Cans or Servings Per Day:  1       Frequency:  Two Times a day  Entered: Oct 28 2021  2:52PM    
This patient has been assessed with a concern for Malnutrition and has been determined to have a diagnosis/diagnoses of Moderate protein-calorie malnutrition.    This patient is being managed with:   Diet DASH/TLC-  Sodium & Cholesterol Restricted  Consistent Carbohydrate {Evening Snack} (CSTCHOSN)  1500mL Fluid Restriction ([id number])  Supplement Feeding Modality:  Oral  Ensure Enlive Cans or Servings Per Day:  1       Frequency:  Two Times a day  Entered: Oct 28 2021  2:52PM    
This patient has been assessed with a concern for Malnutrition and has been determined to have a diagnosis/diagnoses of Moderate protein-calorie malnutrition.    This patient is being managed with:   Diet DASH/TLC-  Sodium & Cholesterol Restricted  Consistent Carbohydrate {Evening Snack} (CSTCHOSN)  1500mL Fluid Restriction ([id number])  Supplement Feeding Modality:  Oral  Ensure Enlive Cans or Servings Per Day:  1       Frequency:  Two Times a day  Entered: Oct 28 2021  2:52PM    
This patient has been assessed with a concern for Malnutrition and has been determined to have a diagnosis/diagnoses of Moderate protein-calorie malnutrition.    This patient is being managed with:   Diet DASH/TLC-  Sodium & Cholesterol Restricted  Consistent Carbohydrate {Evening Snack} (CSTCHOSN)  1500mL Fluid Restriction ([id number])  Supplement Feeding Modality:  Oral  Ensure Enlive Cans or Servings Per Day:  1       Frequency:  Two Times a day  Entered: Oct 28 2021  2:52PM

## 2021-11-03 NOTE — PROGRESS NOTE ADULT - PROVIDER SPECIALTY LIST ADULT
Hospitalist
Cardiology
Hospitalist
Cardiology
Hospitalist
Cardiology

## 2021-11-03 NOTE — PROGRESS NOTE ADULT - ASSESSMENT
61M hx  CAD, prior MI, NICM w HFrEF (15%), s/p AICD, HTN, p/w worsening SOB, found to have acute on chronic HFrEF in setting of dietary indiscretion.    #Acute on chronic HFrEF, hx NICM  -reports diet at shelter is high in salt, pt has limited options for fruit/veg. Alot of cold cut sandwiches  -tele  -on RA at rest, pulse ox on RA stable amb.  -trend daily weight, i/o  -BNP 15K on admission, improving  -lipids checked  -A1c 6.2 (pre DM)  -CXR and echo as above  -AICD interrogation as above  -on torsemide 20BID at home, giving Lasix 40iv BID here, changed back to home torsemide dose today  -continue coreg, uptitrate as needed/tolerated  -off losartan, now on entresto to optimize CHF management, cost run $3  **REFUSING ENTRESTO DUE TO GI S/E WILL PLACE BACK ON LOSARTAN ADVISED FOR CARDIO F/U OUTPATIENT W/ PRIMARY CARDIOLOGIST*    #NSVT  - no episodes today thus far. now on coreg 12.5mg BID  - need cardio f/u outpt.    #DERRICK   -Cr improving, trend w diuresis    #thrombocytopenia- borderline, trend    #CAD, hx MI, HTN  -coreg, entresto, asa, statin    #nicotine dependence  -refusing patch    #Moderate protein-calorie malnutrition    #DVT ppx- SQH    #DNR/DNI, MOSLT done    #D/c shelter   61M hx  CAD, prior MI, NICM w HFrEF (15%), s/p AICD, HTN, p/w worsening SOB, found to have acute on chronic HFrEF in setting of dietary indiscretion.    #Acute on chronic HFrEF, hx NICM  -reports diet at shelter is high in salt, pt has limited options for fruit/veg. Alot of cold cut sandwiches  -tele  -on RA at rest, pulse ox on RA stable amb.  -trend daily weight, i/o  -BNP 15K on admission, improving  -lipids checked  -A1c 6.2 (pre DM)  -CXR and echo as above  -AICD interrogation as above  -on torsemide 20BID at home, giving Lasix 40iv BID here, changed back to home torsemide dose today  -continue coreg, uptitrate as needed/tolerated  -off losartan, now on entresto to optimize CHF management, cost run $3  **REFUSING ENTRESTO DUE TO GI S/E WILL PLACE BACK ON LOSARTAN ADVISED FOR CARDIO F/U OUTPATIENT W/ PRIMARY CARDIOLOGIST Dr. Rosas from [facility]*    #NSVT  - no episodes today thus far. now on coreg 12.5mg BID  - need cardio f/u outpt.    #DERRICK   -Cr improving, trend w diuresis    #thrombocytopenia- borderline, trend    #CAD, hx MI, HTN  -coreg, entresto, asa, statin    #nicotine dependence  -refusing patch    #Moderate protein-calorie malnutrition    #DVT ppx- SQH    #DNR/DNI, MOSLT done    #D/c shelter

## 2021-11-03 NOTE — PROGRESS NOTE ADULT - REASON FOR ADMISSION
Shortness of breath

## 2021-11-03 NOTE — PROVIDER CONTACT NOTE (OTHER) - DATE AND TIME:
03-Nov-2021 11:26
27-Oct-2021 14:59
27-Oct-2021 15:04
27-Oct-2021 18:00
27-Oct-2021 17:53
01-Nov-2021 01:41

## 2021-11-03 NOTE — PROGRESS NOTE ADULT - SUBJECTIVE AND OBJECTIVE BOX
NP HOSPITALIST PROGRESS NOTE    Chart and meds reviewed.  Patient seen and examined.    CC: SOB    Subjective: Resting comfortably today. no issues. d/c when shelter available  refusing to take entresto due to GI issues, requesting losartan     All other systems reviewed and found to be negative with the exception of what has been described above.    MEDICATIONS  (STANDING):  aspirin enteric coated 81 milliGRAM(s) Oral daily  atorvastatin 40 milliGRAM(s) Oral at bedtime  carvedilol 12.5 milliGRAM(s) Oral every 12 hours  heparin   Injectable 5000 Unit(s) SubCutaneous every 12 hours  nicotine - 21 mG/24Hr(s) Patch 1 patch Transdermal daily  sacubitril 24 mG/valsartan 26 mG 1 Tablet(s) Oral two times a day  torsemide 20 milliGRAM(s) Oral two times a day    MEDICATIONS  (PRN):  melatonin 5 milliGRAM(s) Oral at bedtime PRN Insomnia  senna 2 Tablet(s) Oral at bedtime PRN Constipation    Home Medications:  carvedilol 3.125 mg oral tablet: 1 tab(s) orally 2 times a day (27 Oct 2021 13:50)  losartan 25 mg oral tablet: 1 tab(s) orally once a day (27 Oct 2021 13:50)  Moderna COVID-19 Vaccine  mcg/0.5 mL intramuscular suspension: 0.5 milliliter(s) intramuscular once  ***2nd dose 10/17*** (27 Oct 2021 13:50)  torsemide 20 mg oral tablet: 1 tab(s) orally 2 times a day (27 Oct 2021 13:50)    Vital Signs Last 24 Hrs  T(C): 36.7 (03 Nov 2021 08:14), Max: 36.9 (02 Nov 2021 15:42)  T(F): 98.1 (03 Nov 2021 08:14), Max: 98.4 (02 Nov 2021 15:42)  HR: 85 (03 Nov 2021 08:14) (73 - 85)  BP: 109/73 (03 Nov 2021 08:14) (94/65 - 109/73)  BP(mean): --  RR: 18 (03 Nov 2021 08:14) (18 - 18)  SpO2: 100% (03 Nov 2021 08:14) (95% - 100%)    PHYSICAL EXAM:  Gen: NAD  HEENT:  pupils equal and reactive, EOMI, no oropharyngeal lesions, erythema, exudates, oral thrush  NECK:   supple, no carotid bruits, no palpable lymph nodes, no thyromegaly  CV:  +S1, +S2, regular, no murmurs or rubs  RESP:   lungs clear to auscultation bilaterally, no wheezing, rales, rhonchi, good air entry bilaterally, scattered crackles b/l  BREAST:  not examined  GI:  abdomen soft, non-tender, non-distended, normal BS, no bruits, no abdominal masses, no palpable masses  RECTAL:  not examined  :  not examined  MSK:   normal muscle tone, no atrophy, no rigidity, no contractions  EXT:  no clubbing, no cyanosis, no edema, no calf pain, swelling or erythema  VASCULAR:  pulses equal and symmetric in the upper and lower extremities  NEURO:  AAOX3, no focal neurological deficits, follows all commands, able to move extremities spontaneously  SKIN:  no ulcers, lesions or rashes    LABS: All Labs Reviewed:                        14.6   3.73  )-----------( 180      ( 02 Nov 2021 06:39 )             45.4     11-02    144  |  105  |  39<H>  ----------------------------<  94  4.2   |  31  |  1.30    Ca    8.4<L>      02 Nov 2021 06:39    TPro  6.4  /  Alb  3.3  /  TBili  0.8  /  DBili  x   /  AST  33  /  ALT  30  /  AlkPhos  189<H>  11-02    CULTURES:  no new    Additional results/Imaging, I have personally reviewed:  CXR 10/27/21: Heart enlargement defibrillator. Right base pleuropulmonary reaction.    Echo 10/28/21: The left ventricle cavity is severely dilated. Left ventricle systolic function appears severely and globally impaired; segmental wall motion abnormalities noted. Estimated EjectionFraction is 16 %. The left atrium is mildly dilated. The right atrium appears moderately dilated. A device wire is seen in the RV and RA. Normal appearing right ventricle structure and function. A device wire is seen in the RV and RA. Fibrocalcific changes noted to the Aortic valve leaflets with preserved leaflet excursion. Fibrocalcific changes noted to the mitral valve leaflets with preserved leaflet excursion. EA reversal of the mitral inflow consistent with reduced compliance of the left ventricle. Moderate to severe (3+) mitral regurgitation is present. Normal appearing tricuspid valve structure. Moderate to severe (3+) tricuspid valve regurgitation is present. Moderate pulmonary hypertension. No evidence of pericardial effusion. IVC is dilated and not collapsing with inspiration.    AICD interrogation 10/28/21:  Episodes: episodes of NSVT, most recent on 10/19/21, no ICD therapies delivered.   Changes made: None  Conclusion: Normal device function.    Telemetry, personally reviewed:  10/29/21: sinus 90-110s, APCs  10/30/21: NSR, NSVT  10/31/21: sinus 70-90, NSVT, SVT earlier in stay  11/1/21: no events, nsr   NP HOSPITALIST PROGRESS NOTE    Chart and meds reviewed.  Patient seen and examined.    CC: SOB    Subjective: Resting comfortably today. no issues. d/c when shelter available  refusing to take entresto due to GI issues, requesting losartan and refusing statin    All other systems reviewed and found to be negative with the exception of what has been described above.    MEDICATIONS  (STANDING):  aspirin enteric coated 81 milliGRAM(s) Oral daily  atorvastatin 40 milliGRAM(s) Oral at bedtime  carvedilol 12.5 milliGRAM(s) Oral every 12 hours  heparin   Injectable 5000 Unit(s) SubCutaneous every 12 hours  nicotine - 21 mG/24Hr(s) Patch 1 patch Transdermal daily  sacubitril 24 mG/valsartan 26 mG 1 Tablet(s) Oral two times a day  torsemide 20 milliGRAM(s) Oral two times a day    MEDICATIONS  (PRN):  melatonin 5 milliGRAM(s) Oral at bedtime PRN Insomnia  senna 2 Tablet(s) Oral at bedtime PRN Constipation    Home Medications:  carvedilol 3.125 mg oral tablet: 1 tab(s) orally 2 times a day (27 Oct 2021 13:50)  losartan 25 mg oral tablet: 1 tab(s) orally once a day (27 Oct 2021 13:50)  Moderna COVID-19 Vaccine  mcg/0.5 mL intramuscular suspension: 0.5 milliliter(s) intramuscular once  ***2nd dose 10/17*** (27 Oct 2021 13:50)  torsemide 20 mg oral tablet: 1 tab(s) orally 2 times a day (27 Oct 2021 13:50)    Vital Signs Last 24 Hrs  T(C): 36.7 (03 Nov 2021 08:14), Max: 36.9 (02 Nov 2021 15:42)  T(F): 98.1 (03 Nov 2021 08:14), Max: 98.4 (02 Nov 2021 15:42)  HR: 85 (03 Nov 2021 08:14) (73 - 85)  BP: 109/73 (03 Nov 2021 08:14) (94/65 - 109/73)  BP(mean): --  RR: 18 (03 Nov 2021 08:14) (18 - 18)  SpO2: 100% (03 Nov 2021 08:14) (95% - 100%)    PHYSICAL EXAM:  Gen: NAD  HEENT:  pupils equal and reactive, EOMI, no oropharyngeal lesions, erythema, exudates, oral thrush  NECK:   supple, no carotid bruits, no palpable lymph nodes, no thyromegaly  CV:  +S1, +S2, regular, no murmurs or rubs  RESP:   lungs clear to auscultation bilaterally, no wheezing, rales, rhonchi, good air entry bilaterally, scattered crackles b/l  BREAST:  not examined  GI:  abdomen soft, non-tender, non-distended, normal BS, no bruits, no abdominal masses, no palpable masses  RECTAL:  not examined  :  not examined  MSK:   normal muscle tone, no atrophy, no rigidity, no contractions  EXT:  no clubbing, no cyanosis, no edema, no calf pain, swelling or erythema  VASCULAR:  pulses equal and symmetric in the upper and lower extremities  NEURO:  AAOX3, no focal neurological deficits, follows all commands, able to move extremities spontaneously  SKIN:  no ulcers, lesions or rashes    LABS: All Labs Reviewed:                        14.6   3.73  )-----------( 180      ( 02 Nov 2021 06:39 )             45.4     11-02    144  |  105  |  39<H>  ----------------------------<  94  4.2   |  31  |  1.30    Ca    8.4<L>      02 Nov 2021 06:39    TPro  6.4  /  Alb  3.3  /  TBili  0.8  /  DBili  x   /  AST  33  /  ALT  30  /  AlkPhos  189<H>  11-02    CULTURES:  no new    Additional results/Imaging, I have personally reviewed:  CXR 10/27/21: Heart enlargement defibrillator. Right base pleuropulmonary reaction.    Echo 10/28/21: The left ventricle cavity is severely dilated. Left ventricle systolic function appears severely and globally impaired; segmental wall motion abnormalities noted. Estimated EjectionFraction is 16 %. The left atrium is mildly dilated. The right atrium appears moderately dilated. A device wire is seen in the RV and RA. Normal appearing right ventricle structure and function. A device wire is seen in the RV and RA. Fibrocalcific changes noted to the Aortic valve leaflets with preserved leaflet excursion. Fibrocalcific changes noted to the mitral valve leaflets with preserved leaflet excursion. EA reversal of the mitral inflow consistent with reduced compliance of the left ventricle. Moderate to severe (3+) mitral regurgitation is present. Normal appearing tricuspid valve structure. Moderate to severe (3+) tricuspid valve regurgitation is present. Moderate pulmonary hypertension. No evidence of pericardial effusion. IVC is dilated and not collapsing with inspiration.    AICD interrogation 10/28/21:  Episodes: episodes of NSVT, most recent on 10/19/21, no ICD therapies delivered.   Changes made: None  Conclusion: Normal device function.    Telemetry, personally reviewed:  10/29/21: sinus 90-110s, APCs  10/30/21: NSR, NSVT  10/31/21: sinus 70-90, NSVT, SVT earlier in stay  11/1/21: no events, nsr

## 2021-11-05 ENCOUNTER — APPOINTMENT (OUTPATIENT)
Age: 61
End: 2021-11-05

## 2021-11-08 DIAGNOSIS — E44.0 MODERATE PROTEIN-CALORIE MALNUTRITION: ICD-10-CM

## 2021-11-08 DIAGNOSIS — I25.10 ATHEROSCLEROTIC HEART DISEASE OF NATIVE CORONARY ARTERY WITHOUT ANGINA PECTORIS: ICD-10-CM

## 2021-11-08 DIAGNOSIS — I50.23 ACUTE ON CHRONIC SYSTOLIC (CONGESTIVE) HEART FAILURE: ICD-10-CM

## 2021-11-08 DIAGNOSIS — Z66 DO NOT RESUSCITATE: ICD-10-CM

## 2021-11-08 DIAGNOSIS — I47.2 VENTRICULAR TACHYCARDIA: ICD-10-CM

## 2021-11-08 DIAGNOSIS — Z95.810 PRESENCE OF AUTOMATIC (IMPLANTABLE) CARDIAC DEFIBRILLATOR: ICD-10-CM

## 2021-11-08 DIAGNOSIS — F12.90 CANNABIS USE, UNSPECIFIED, UNCOMPLICATED: ICD-10-CM

## 2021-11-08 DIAGNOSIS — J96.01 ACUTE RESPIRATORY FAILURE WITH HYPOXIA: ICD-10-CM

## 2021-11-08 DIAGNOSIS — D69.6 THROMBOCYTOPENIA, UNSPECIFIED: ICD-10-CM

## 2021-11-08 DIAGNOSIS — Z91.11 PATIENT'S NONCOMPLIANCE WITH DIETARY REGIMEN: ICD-10-CM

## 2021-11-08 DIAGNOSIS — Z79.82 LONG TERM (CURRENT) USE OF ASPIRIN: ICD-10-CM

## 2021-11-08 DIAGNOSIS — I13.0 HYPERTENSIVE HEART AND CHRONIC KIDNEY DISEASE WITH HEART FAILURE AND STAGE 1 THROUGH STAGE 4 CHRONIC KIDNEY DISEASE, OR UNSPECIFIED CHRONIC KIDNEY DISEASE: ICD-10-CM

## 2021-11-08 DIAGNOSIS — F17.210 NICOTINE DEPENDENCE, CIGARETTES, UNCOMPLICATED: ICD-10-CM

## 2021-11-08 DIAGNOSIS — N18.2 CHRONIC KIDNEY DISEASE, STAGE 2 (MILD): ICD-10-CM

## 2021-11-08 DIAGNOSIS — I42.8 OTHER CARDIOMYOPATHIES: ICD-10-CM

## 2021-11-08 DIAGNOSIS — I25.2 OLD MYOCARDIAL INFARCTION: ICD-10-CM

## 2021-11-08 DIAGNOSIS — N17.9 ACUTE KIDNEY FAILURE, UNSPECIFIED: ICD-10-CM

## 2021-11-18 ENCOUNTER — INPATIENT (INPATIENT)
Facility: HOSPITAL | Age: 61
LOS: 13 days | Discharge: HOME | DRG: 291 | End: 2021-12-02
Attending: INTERNAL MEDICINE | Admitting: STUDENT IN AN ORGANIZED HEALTH CARE EDUCATION/TRAINING PROGRAM
Payer: MEDICAID

## 2021-11-18 VITALS
OXYGEN SATURATION: 98 % | RESPIRATION RATE: 20 BRPM | DIASTOLIC BLOOD PRESSURE: 74 MMHG | TEMPERATURE: 98 F | SYSTOLIC BLOOD PRESSURE: 127 MMHG | HEIGHT: 72 IN | HEART RATE: 80 BPM | WEIGHT: 160.06 LBS

## 2021-11-18 DIAGNOSIS — I48.92 UNSPECIFIED ATRIAL FLUTTER: ICD-10-CM

## 2021-11-18 LAB
ALBUMIN SERPL ELPH-MCNC: 3.9 G/DL — SIGNIFICANT CHANGE UP (ref 3.3–5.2)
ALP SERPL-CCNC: 211 U/L — HIGH (ref 40–120)
ALT FLD-CCNC: 20 U/L — SIGNIFICANT CHANGE UP
ANION GAP SERPL CALC-SCNC: 17 MMOL/L — SIGNIFICANT CHANGE UP (ref 5–17)
APTT BLD: 38.8 SEC — HIGH (ref 27.5–35.5)
AST SERPL-CCNC: 31 U/L — SIGNIFICANT CHANGE UP
BASOPHILS # BLD AUTO: 0.02 K/UL — SIGNIFICANT CHANGE UP (ref 0–0.2)
BASOPHILS NFR BLD AUTO: 0.4 % — SIGNIFICANT CHANGE UP (ref 0–2)
BILIRUB SERPL-MCNC: 1 MG/DL — SIGNIFICANT CHANGE UP (ref 0.4–2)
BUN SERPL-MCNC: 28.1 MG/DL — HIGH (ref 8–20)
CALCIUM SERPL-MCNC: 8.7 MG/DL — SIGNIFICANT CHANGE UP (ref 8.6–10.2)
CHLORIDE SERPL-SCNC: 99 MMOL/L — SIGNIFICANT CHANGE UP (ref 98–107)
CO2 SERPL-SCNC: 24 MMOL/L — SIGNIFICANT CHANGE UP (ref 22–29)
CREAT SERPL-MCNC: 1.17 MG/DL — SIGNIFICANT CHANGE UP (ref 0.5–1.3)
EOSINOPHIL # BLD AUTO: 0.01 K/UL — SIGNIFICANT CHANGE UP (ref 0–0.5)
EOSINOPHIL NFR BLD AUTO: 0.2 % — SIGNIFICANT CHANGE UP (ref 0–6)
GLUCOSE SERPL-MCNC: 97 MG/DL — SIGNIFICANT CHANGE UP (ref 70–99)
HCT VFR BLD CALC: 47.8 % — SIGNIFICANT CHANGE UP (ref 39–50)
HGB BLD-MCNC: 15.4 G/DL — SIGNIFICANT CHANGE UP (ref 13–17)
IMM GRANULOCYTES NFR BLD AUTO: 0.2 % — SIGNIFICANT CHANGE UP (ref 0–1.5)
INR BLD: 1.55 RATIO — HIGH (ref 0.88–1.16)
LYMPHOCYTES # BLD AUTO: 0.76 K/UL — LOW (ref 1–3.3)
LYMPHOCYTES # BLD AUTO: 14.3 % — SIGNIFICANT CHANGE UP (ref 13–44)
MAGNESIUM SERPL-MCNC: 2 MG/DL — SIGNIFICANT CHANGE UP (ref 1.6–2.6)
MCHC RBC-ENTMCNC: 30 PG — SIGNIFICANT CHANGE UP (ref 27–34)
MCHC RBC-ENTMCNC: 32.2 GM/DL — SIGNIFICANT CHANGE UP (ref 32–36)
MCV RBC AUTO: 93 FL — SIGNIFICANT CHANGE UP (ref 80–100)
MONOCYTES # BLD AUTO: 0.6 K/UL — SIGNIFICANT CHANGE UP (ref 0–0.9)
MONOCYTES NFR BLD AUTO: 11.3 % — SIGNIFICANT CHANGE UP (ref 2–14)
NEUTROPHILS # BLD AUTO: 3.9 K/UL — SIGNIFICANT CHANGE UP (ref 1.8–7.4)
NEUTROPHILS NFR BLD AUTO: 73.6 % — SIGNIFICANT CHANGE UP (ref 43–77)
NT-PROBNP SERPL-SCNC: HIGH PG/ML (ref 0–300)
PHOSPHATE SERPL-MCNC: 2.9 MG/DL — SIGNIFICANT CHANGE UP (ref 2.4–4.7)
PLATELET # BLD AUTO: 182 K/UL — SIGNIFICANT CHANGE UP (ref 150–400)
POTASSIUM SERPL-MCNC: 3.7 MMOL/L — SIGNIFICANT CHANGE UP (ref 3.5–5.3)
POTASSIUM SERPL-SCNC: 3.7 MMOL/L — SIGNIFICANT CHANGE UP (ref 3.5–5.3)
PROT SERPL-MCNC: 6.9 G/DL — SIGNIFICANT CHANGE UP (ref 6.6–8.7)
PROTHROM AB SERPL-ACNC: 17.6 SEC — HIGH (ref 10.6–13.6)
RBC # BLD: 5.14 M/UL — SIGNIFICANT CHANGE UP (ref 4.2–5.8)
RBC # FLD: 15.2 % — HIGH (ref 10.3–14.5)
SODIUM SERPL-SCNC: 140 MMOL/L — SIGNIFICANT CHANGE UP (ref 135–145)
TROPONIN T SERPL-MCNC: 0.03 NG/ML — SIGNIFICANT CHANGE UP (ref 0–0.06)
WBC # BLD: 5.3 K/UL — SIGNIFICANT CHANGE UP (ref 3.8–10.5)
WBC # FLD AUTO: 5.3 K/UL — SIGNIFICANT CHANGE UP (ref 3.8–10.5)

## 2021-11-18 PROCEDURE — 93010 ELECTROCARDIOGRAM REPORT: CPT

## 2021-11-18 PROCEDURE — 71045 X-RAY EXAM CHEST 1 VIEW: CPT | Mod: 26

## 2021-11-18 PROCEDURE — 99285 EMERGENCY DEPT VISIT HI MDM: CPT

## 2021-11-18 RX ORDER — METOPROLOL TARTRATE 50 MG
5 TABLET ORAL ONCE
Refills: 0 | Status: COMPLETED | OUTPATIENT
Start: 2021-11-18 | End: 2021-11-18

## 2021-11-18 RX ADMIN — Medication 5 MILLIGRAM(S): at 20:45

## 2021-11-18 NOTE — ED PROVIDER NOTE - CLINICAL SUMMARY MEDICAL DECISION MAKING FREE TEXT BOX
History of ischemic cardiomyopathy with ICD reports 14 shocks prior to arrival, currently in rate controlled atrial flutter s/p diltiazem by EMS, currently asymptomatic. Unable to interrogate device, will be seen in early morning by device rep for interrogation. Labs with no actionable findings, will admit on telemetry, cardiology consulted.

## 2021-11-18 NOTE — ED ADULT NURSE REASSESSMENT NOTE - GENERAL PATIENT STATE
Patient is alert and oriented x 3, breathing 2L/min of oxygen via nasal cannula with no sign of acute distress noted. Patient is admitted for A. flutter with rapid ventricular response. Cardiology consult in place. Patient is in stable condition./comfortable appearance/cooperative/improvement verbalized

## 2021-11-18 NOTE — ED PROVIDER NOTE - PROGRESS NOTE DETAILS
Jered: [employer] rep contacted for interrogation Jered: [employer] rep to interrogate device tomorrow am ~7am

## 2021-11-18 NOTE — ED PROVIDER NOTE - OBJECTIVE STATEMENT
61yom w/ CHF s/p AICD p/w multiple defibrillations. Pt states he was at rest with his friends at home and suddenly felt his defibrillator fire. He states it fired 14 times over the course of a half hour or so. On EMS arrival pt in AF w/ RVR, given diltiazem IV with rate control into the 90s. Pt denies any symptoms prior to device activation.

## 2021-11-19 DIAGNOSIS — Z45.02 ENCOUNTER FOR ADJUSTMENT AND MANAGEMENT OF AUTOMATIC IMPLANTABLE CARDIAC DEFIBRILLATOR: ICD-10-CM

## 2021-11-19 DIAGNOSIS — I10 ESSENTIAL (PRIMARY) HYPERTENSION: ICD-10-CM

## 2021-11-19 DIAGNOSIS — I50.23 ACUTE ON CHRONIC SYSTOLIC (CONGESTIVE) HEART FAILURE: ICD-10-CM

## 2021-11-19 DIAGNOSIS — I48.92 UNSPECIFIED ATRIAL FLUTTER: ICD-10-CM

## 2021-11-19 PROBLEM — I42.8 OTHER CARDIOMYOPATHIES: Chronic | Status: ACTIVE | Noted: 2021-10-27

## 2021-11-19 LAB
ANION GAP SERPL CALC-SCNC: 17 MMOL/L — SIGNIFICANT CHANGE UP (ref 5–17)
BUN SERPL-MCNC: 32.1 MG/DL — HIGH (ref 8–20)
CALCIUM SERPL-MCNC: 8.7 MG/DL — SIGNIFICANT CHANGE UP (ref 8.6–10.2)
CHLORIDE SERPL-SCNC: 99 MMOL/L — SIGNIFICANT CHANGE UP (ref 98–107)
CO2 SERPL-SCNC: 22 MMOL/L — SIGNIFICANT CHANGE UP (ref 22–29)
CREAT SERPL-MCNC: 1.29 MG/DL — SIGNIFICANT CHANGE UP (ref 0.5–1.3)
GLUCOSE SERPL-MCNC: 161 MG/DL — HIGH (ref 70–99)
HCT VFR BLD CALC: 47.7 % — SIGNIFICANT CHANGE UP (ref 39–50)
HGB BLD-MCNC: 15.3 G/DL — SIGNIFICANT CHANGE UP (ref 13–17)
MAGNESIUM SERPL-MCNC: 1.9 MG/DL — SIGNIFICANT CHANGE UP (ref 1.6–2.6)
MCHC RBC-ENTMCNC: 29 PG — SIGNIFICANT CHANGE UP (ref 27–34)
MCHC RBC-ENTMCNC: 32.1 GM/DL — SIGNIFICANT CHANGE UP (ref 32–36)
MCV RBC AUTO: 90.3 FL — SIGNIFICANT CHANGE UP (ref 80–100)
PHOSPHATE SERPL-MCNC: 3.8 MG/DL — SIGNIFICANT CHANGE UP (ref 2.4–4.7)
PLATELET # BLD AUTO: 165 K/UL — SIGNIFICANT CHANGE UP (ref 150–400)
POTASSIUM SERPL-MCNC: 4.3 MMOL/L — SIGNIFICANT CHANGE UP (ref 3.5–5.3)
POTASSIUM SERPL-SCNC: 4.3 MMOL/L — SIGNIFICANT CHANGE UP (ref 3.5–5.3)
RBC # BLD: 5.28 M/UL — SIGNIFICANT CHANGE UP (ref 4.2–5.8)
RBC # FLD: 15.6 % — HIGH (ref 10.3–14.5)
SODIUM SERPL-SCNC: 138 MMOL/L — SIGNIFICANT CHANGE UP (ref 135–145)
WBC # BLD: 5.47 K/UL — SIGNIFICANT CHANGE UP (ref 3.8–10.5)
WBC # FLD AUTO: 5.47 K/UL — SIGNIFICANT CHANGE UP (ref 3.8–10.5)

## 2021-11-19 PROCEDURE — 71046 X-RAY EXAM CHEST 2 VIEWS: CPT | Mod: 26

## 2021-11-19 PROCEDURE — 99223 1ST HOSP IP/OBS HIGH 75: CPT

## 2021-11-19 PROCEDURE — 12345: CPT | Mod: NC

## 2021-11-19 RX ORDER — LOSARTAN POTASSIUM 100 MG/1
25 TABLET, FILM COATED ORAL DAILY
Refills: 0 | Status: DISCONTINUED | OUTPATIENT
Start: 2021-11-19 | End: 2021-11-21

## 2021-11-19 RX ORDER — BUDESONIDE AND FORMOTEROL FUMARATE DIHYDRATE 160; 4.5 UG/1; UG/1
2 AEROSOL RESPIRATORY (INHALATION)
Refills: 0 | Status: DISCONTINUED | OUTPATIENT
Start: 2021-11-19 | End: 2021-12-02

## 2021-11-19 RX ORDER — ONDANSETRON 8 MG/1
4 TABLET, FILM COATED ORAL EVERY 8 HOURS
Refills: 0 | Status: DISCONTINUED | OUTPATIENT
Start: 2021-11-19 | End: 2021-12-02

## 2021-11-19 RX ORDER — BISOPROLOL FUMARATE 10 MG/1
2.5 TABLET, FILM COATED ORAL ONCE
Refills: 0 | Status: COMPLETED | OUTPATIENT
Start: 2021-11-19 | End: 2021-11-19

## 2021-11-19 RX ORDER — FUROSEMIDE 40 MG
40 TABLET ORAL
Refills: 0 | Status: DISCONTINUED | OUTPATIENT
Start: 2021-11-19 | End: 2021-11-21

## 2021-11-19 RX ORDER — TIOTROPIUM BROMIDE 18 UG/1
1 CAPSULE ORAL; RESPIRATORY (INHALATION) DAILY
Refills: 0 | Status: DISCONTINUED | OUTPATIENT
Start: 2021-11-19 | End: 2021-12-02

## 2021-11-19 RX ORDER — ENOXAPARIN SODIUM 100 MG/ML
40 INJECTION SUBCUTANEOUS DAILY
Refills: 0 | Status: DISCONTINUED | OUTPATIENT
Start: 2021-11-19 | End: 2021-11-19

## 2021-11-19 RX ORDER — IPRATROPIUM/ALBUTEROL SULFATE 18-103MCG
3 AEROSOL WITH ADAPTER (GRAM) INHALATION EVERY 6 HOURS
Refills: 0 | Status: DISCONTINUED | OUTPATIENT
Start: 2021-11-19 | End: 2021-11-29

## 2021-11-19 RX ORDER — INFLUENZA VIRUS VACCINE 15; 15; 15; 15 UG/.5ML; UG/.5ML; UG/.5ML; UG/.5ML
0.5 SUSPENSION INTRAMUSCULAR ONCE
Refills: 0 | Status: DISCONTINUED | OUTPATIENT
Start: 2021-11-19 | End: 2021-12-02

## 2021-11-19 RX ORDER — ATORVASTATIN CALCIUM 80 MG/1
40 TABLET, FILM COATED ORAL AT BEDTIME
Refills: 0 | Status: DISCONTINUED | OUTPATIENT
Start: 2021-11-19 | End: 2021-12-02

## 2021-11-19 RX ORDER — CARVEDILOL PHOSPHATE 80 MG/1
6.25 CAPSULE, EXTENDED RELEASE ORAL EVERY 12 HOURS
Refills: 0 | Status: DISCONTINUED | OUTPATIENT
Start: 2021-11-19 | End: 2021-11-19

## 2021-11-19 RX ORDER — LANOLIN ALCOHOL/MO/W.PET/CERES
3 CREAM (GRAM) TOPICAL AT BEDTIME
Refills: 0 | Status: DISCONTINUED | OUTPATIENT
Start: 2021-11-19 | End: 2021-12-02

## 2021-11-19 RX ORDER — NICOTINE POLACRILEX 2 MG
1 GUM BUCCAL DAILY
Refills: 0 | Status: DISCONTINUED | OUTPATIENT
Start: 2021-11-19 | End: 2021-12-02

## 2021-11-19 RX ORDER — ASPIRIN/CALCIUM CARB/MAGNESIUM 324 MG
81 TABLET ORAL DAILY
Refills: 0 | Status: DISCONTINUED | OUTPATIENT
Start: 2021-11-19 | End: 2021-11-22

## 2021-11-19 RX ORDER — ENOXAPARIN SODIUM 100 MG/ML
70 INJECTION SUBCUTANEOUS
Refills: 0 | Status: DISCONTINUED | OUTPATIENT
Start: 2021-11-19 | End: 2021-11-21

## 2021-11-19 RX ORDER — BISOPROLOL FUMARATE 10 MG/1
5 TABLET, FILM COATED ORAL AT BEDTIME
Refills: 0 | Status: DISCONTINUED | OUTPATIENT
Start: 2021-11-20 | End: 2021-11-21

## 2021-11-19 RX ORDER — ALBUTEROL 90 UG/1
2 AEROSOL, METERED ORAL EVERY 6 HOURS
Refills: 0 | Status: COMPLETED | OUTPATIENT
Start: 2021-11-19 | End: 2022-01-01

## 2021-11-19 RX ORDER — ACETAMINOPHEN 500 MG
650 TABLET ORAL EVERY 6 HOURS
Refills: 0 | Status: DISCONTINUED | OUTPATIENT
Start: 2021-11-19 | End: 2021-12-02

## 2021-11-19 RX ADMIN — Medication 3 MILLILITER(S): at 21:02

## 2021-11-19 RX ADMIN — Medication 650 MILLIGRAM(S): at 03:17

## 2021-11-19 RX ADMIN — CARVEDILOL PHOSPHATE 6.25 MILLIGRAM(S): 80 CAPSULE, EXTENDED RELEASE ORAL at 06:46

## 2021-11-19 RX ADMIN — LOSARTAN POTASSIUM 25 MILLIGRAM(S): 100 TABLET, FILM COATED ORAL at 06:46

## 2021-11-19 RX ADMIN — ATORVASTATIN CALCIUM 40 MILLIGRAM(S): 80 TABLET, FILM COATED ORAL at 22:35

## 2021-11-19 RX ADMIN — Medication 40 MILLIGRAM(S): at 02:02

## 2021-11-19 RX ADMIN — Medication 40 MILLIGRAM(S): at 02:01

## 2021-11-19 RX ADMIN — Medication 81 MILLIGRAM(S): at 11:21

## 2021-11-19 RX ADMIN — Medication 40 MILLIGRAM(S): at 17:53

## 2021-11-19 RX ADMIN — Medication 3 MILLILITER(S): at 08:16

## 2021-11-19 RX ADMIN — Medication 650 MILLIGRAM(S): at 00:48

## 2021-11-19 RX ADMIN — Medication 40 MILLIGRAM(S): at 17:27

## 2021-11-19 NOTE — CONSULT NOTE ADULT - ATTENDING COMMENTS
Patient was seen and examined at bedside and notes that he was discharged from [facility] on 11/3 and since then was doing well with only exertional shortness of breath   Patient notes that yesterday in the evening that his AICD shocked him ? 14 times   Notes he smokes marijuana with no other elicit drug use and still smokes   Cardiac Cath 2016: CORONARY VESSELS: The coronary circulation is right dominant.  LM:   --  LM: Angiography showed minor luminal irregularities with no flow  limiting lesions.  LAD:   --  Mid LAD: There was a 50 % stenosis.  CX:   --  Circumflex: Angiography showed minor luminal irregularities with  no flow limiting lesions.  RCA:   --  Proximal RCA: There was a 60 % stenosis.  --  Mid RCA: There was a 85 % stenosis.  COMPLICATIONS: There were no complications.  DIAGNOSTIC RECOMMENDATIONS: The patient should continue with the present medications.    Dx: Obstructive CAD/ Ischemic cardiomyopathy (EF 16%) s/p Biotronik AICD (single lead device), New onset Atrial flutter (CHADS-VAsc 2 points  Stroke risk was 2.2% per year), and HTN   - patient is on exam short of breath and wheezing   - proBNP 35679  - s/p Lasix 40mg IVP q 12hrs and will continue, monitor I/Os and monitor electrolytes keep K ~4 and Mag ~2   - for new onset atrial flutter, will start Lovenox 70 mg  sq q 12hrs, awaiting interrogation of device, but likely defibrillation activated or shocked patient possibly due to AFl vs VT. will consult EP and may even consider DCCV vs ablation   - will consider repeat LHC in light of records showing obstructive disease of the RCA and non obstructive disease of the LAD  - continue ASA and start Lipitor 40mg po q daily   - continue with Coreg 6.25 mg po q 12hrs   - will follow up     Aracelis Thompson D.O. [facility]  Cardiology/Vascular Cardiology -[facility]   Telephone # [phone] Patient was seen and examined at bedside and notes that he was discharged from [facility] on 11/3 and since then was doing well with only exertional shortness of breath   Patient notes that yesterday in the evening that his AICD shocked him ? 14 times   Notes he smokes marijuana with no other elicit drug use and still smokes   Cardiac Cath 2016: CORONARY VESSELS: The coronary circulation is right dominant.  LM:   --  LM: Angiography showed minor luminal irregularities with no flow  limiting lesions.  LAD:   --  Mid LAD: There was a 50 % stenosis.  CX:   --  Circumflex: Angiography showed minor luminal irregularities with  no flow limiting lesions.  RCA:   --  Proximal RCA: There was a 60 % stenosis.  --  Mid RCA: There was a 85 % stenosis.  COMPLICATIONS: There were no complications.  DIAGNOSTIC RECOMMENDATIONS: The patient should continue with the present medications.    Dx: Obstructive CAD/Non- Ischemic cardiomyopathy (EF 16%) s/p Biotronik AICD (single lead device), New onset Atrial flutter (CHADS-VAsc 2 points  Stroke risk was 2.2% per year), and HTN   - patient is on exam short of breath and wheezing   - proBNP 46296  - s/p Lasix 40mg IVP q 12hrs and will continue, monitor I/Os and monitor electrolytes keep K ~4 and Mag ~2   - for new onset atrial flutter, will start Lovenox 70 mg  sq q 12hrs, awaiting interrogation of device, but likely defibrillation activated or shocked patient possibly due to AFl vs VT. will consult EP and may even consider DCCV vs ablation   - will consider repeat LHC in light of records showing obstructive disease of the RCA and non obstructive disease of the LAD  - continue ASA and start Lipitor 40mg po q daily   - continue with Coreg 6.25 mg po q 12hrs   - will follow up     Aracelis Thompson D.O. [facility]  Cardiology/Vascular Cardiology -[facility]   Telephone # [phone]

## 2021-11-19 NOTE — H&P ADULT - NSHPPHYSICALEXAM_GEN_ALL_CORE
T(C): 37.4 (11-18-21 @ 20:45), Max: 37.4 (11-18-21 @ 20:45)  HR: 92 (11-19-21 @ 00:35) (80 - 92)  BP: 109/73 (11-19-21 @ 00:35) (94/73 - 127/74)  RR: 20 (11-19-21 @ 00:35) (20 - 25)  SpO2: 94% (11-19-21 @ 00:35) (94% - 99%)    GENERAL: patient appears well, no acute distress, appropriate, pleasant  EYES: sclera clear, no exudates  ENMT: oropharynx clear without erythema, no exudates, moist mucous membranes  NECK: supple, soft, no thyromegaly noted  LUNGS: good air entry bilaterally, clear to auscultation, symmetric breath sounds, no wheezing or rhonchi appreciated  HEART: soft S1/S2, regular rate and rhythm, no murmurs noted, no lower extremity edema  GASTROINTESTINAL: abdomen is soft, nontender, nondistended, normoactive bowel sounds, no palpable masses  INTEGUMENT: good skin turgor, warm skin, appears well perfused  MUSCULOSKELETAL: no clubbing or cyanosis, no obvious deformity  NEUROLOGIC: awake, alert, oriented x3, good muscle tone in 4 extremities, no obvious sensory deficits  PSYCHIATRIC: mood is good, affect is congruent, linear and logical thought process  HEME/LYMPH: no palpable supraclavicular nodules, no obvious ecchymosis or petechiae T(C): 37.4 (11-18-21 @ 20:45), Max: 37.4 (11-18-21 @ 20:45)  HR: 92 (11-19-21 @ 00:35) (80 - 92)  BP: 109/73 (11-19-21 @ 00:35) (94/73 - 127/74)  RR: 20 (11-19-21 @ 00:35) (20 - 25)  SpO2: 94% (11-19-21 @ 00:35) (94% - 99%)    GENERAL: patient appears well, no acute distress, appropriate, pleasant  EYES: sclera clear, no exudates  ENMT: oropharynx clear without erythema, no exudates, moist mucous membranes  NECK: supple, soft, no thyromegaly noted  LUNGS: diffused wheezing, rhonchi and decreased breath sounds at the bases.    HEART: soft S1/S2, regular rate and rhythm, no murmurs noted, no lower extremity edema  GASTROINTESTINAL: abdomen is soft, nontender, nondistended, normoactive bowel sounds, no palpable masses  INTEGUMENT: chronic b/l LE venous skin changes   MUSCULOSKELETAL: no clubbing or cyanosis, no obvious deformity  NEUROLOGIC: awake, alert, oriented x3, good muscle tone in 4 extremities, no obvious sensory deficits  PSYCHIATRIC: mood is good, affect is congruent, linear and logical thought process  HEME/LYMPH: no palpable supraclavicular nodules, no obvious ecchymosis or petechiae

## 2021-11-19 NOTE — CONSULT NOTE ADULT - ASSESSMENT
61M with pmh of  HTN, nonobstructive CAD, NICM, HFrEF (15%) s/p Biotronik ICD (VDD; Shaikh; 6/2021),presented to [facility] ED w/ new atrial flutter and several inappropriate ICD shocks for AFL w/ periods of 1:1 VR. EP is consulted for new AFL and inappropriate ICD shocks.    Recommendations:   Atrial flutter w/ RVR:   - Pt would benefit from CTI ablation. Will plan for inpatient ablation Monday 11/22/21.   - Not previously on a/c; will require VANESSA prior to ablation/cardioversion.   - D/C Coreg in favor of bisoprolol for better rate management.   - Lovenox started; plan to transition to Eliquis.   - AF seen on device interrogation only following inappropriate shocks for AFL w/ RVR. AFL is clearly the predominant current arrhythmia and cause of inappropriate shocks. VDD lead will facilitate monitoring for future AF.      Inappropriate ICD shocks:   - Will adjust VT & VF zones to prevent further inappropriate shocks.   - Rate & rhythm control of atrial flutter as above.     Will d/w Dr. Tran; further recs to follow.  61M with pmh of  HTN, nonobstructive CAD, NICM, HFrEF (15%) s/p Biotronik ICD (VDD; Shaikh; 6/2021),presented to [facility] ED w/ new atrial flutter and several inappropriate ICD shocks for AFL w/ periods of 1:1 VR. EP is consulted for new AFL and inappropriate ICD shocks.    Recommendations:   Atrial flutter w/ RVR:   - Pt would benefit from CTI ablation. Will plan for inpatient ablation Monday 11/22/21.   - Not previously on a/c; will require VANESSA prior to ablation/cardioversion.   - D/C Coreg in favor of bisoprolol for better rate management.   - Lovenox started; plan to transition to Eliquis.   - AF seen on device interrogation only following inappropriate shocks for AFL w/ RVR. AFL is clearly the predominant current arrhythmia and cause of inappropriate shocks. VDD lead will facilitate monitoring for future AF.      Inappropriate ICD shocks:   - VT2 zone deactivated and VF zone detection rate increased to 240bpm to prevent further inappropriate shocks while awaiting ablation. Will re-optimize programming post ablation.   - Rate & rhythm control of atrial flutter as above.     Will d/w Dr. Tran; further recs to follow.  61M with pmh of  HTN, CAD (50%mLAD, 60%pRCA and 85% mRCA on cath 2016 - no intervention), NICM, HFrEF (15%) s/p Biotronik ICD (VDD; Neel; 6/2021),presented to [facility] ED w/ new atrial flutter and several inappropriate ICD shocks for AFL w/ periods of 1:1 VR. EP is consulted for new AFL and inappropriate ICD shocks.    Recommendations:   Atrial flutter w/ RVR:   - Pt would benefit from CTI ablation. Will plan for inpatient ablation Monday 11/22/21.   - Not previously on a/c; will require VANESSA prior to ablation/cardioversion.   - D/C Coreg in favor of bisoprolol for better rate management.   - Lovenox started; plan to transition to Eliquis.   - AF seen on device interrogation only following inappropriate shocks for AFL w/ RVR. AFL is clearly the predominant current arrhythmia and cause of inappropriate shocks. VDD lead will facilitate monitoring for future AF.      Inappropriate ICD shocks:   - VT2 zone deactivated and VF zone detection rate increased to 240bpm to prevent further inappropriate shocks while awaiting ablation. Will re-optimize programming post ablation.   - Rate & rhythm control of atrial flutter as above.     Will d/w Dr. Tran; further recs to follow.  61M with pmh of  HTN, CAD (50%mLAD, 60%pRCA and 85% mRCA on cath 2016 - no intervention), NICM, HFrEF (15%) s/p Biotronik ICD (VDD; Neel; 6/2021),presented to [facility] ED w/ new atrial flutter and several inappropriate ICD shocks for AFL w/ periods of 1:1 VR. EP is consulted for new AFL and inappropriate ICD shocks.    Recommendations:   Atrial flutter w/ RVR:   - Pt would benefit from AFL ablation. Will plan for inpatient ablation Monday 11/22/21.   - Not previously on a/c; will require VANESSA prior to ablation/cardioversion.   - D/C Coreg in favor of bisoprolol for better rate management.   - Lovenox started; plan to transition to Eliquis.   - AF seen on device interrogation only following inappropriate shocks for AFL w/ RVR. AFL is clearly the predominant current arrhythmia and cause of inappropriate shocks. VDD lead will facilitate monitoring for future AF.      Inappropriate ICD shocks:   - VT2 zone deactivated and VF zone detection rate increased to 240bpm to prevent further inappropriate shocks while awaiting ablation. Will re-optimize programming post ablation.   - Rate & rhythm control of atrial flutter as above.     Will d/w Dr. Tran; further recs to follow.

## 2021-11-19 NOTE — H&P ADULT - NSICDXFAMILYHX_GEN_ALL_CORE_FT
FAMILY HISTORY:  No pertinent family history in first degree relatives     FAMILY HISTORY:  FH: lung cancer

## 2021-11-19 NOTE — CONSULT NOTE ADULT - ATTENDING COMMENTS
Seen and examined, agree with above unless specified below. Spoke with Alexa Thompson and Neel. Offered patient the option of transfer to [facility] for his EP care, but he is refusing transfer and want his EP inpatient care to be done here and possibly even his outpatient care after.     1. New AFL, likely CTI dependant. Will be difficult to rate control, soft BP on Coreg, not a good candidate for AAT other than amiodarone. Has episodes of 1:1 AV conduction resulting in inappropriate ICD shocks. DCCV wont be helpful as patient was in and out of AFL several times with the ICD shocks, so likely to go back to AFL if did DCCV only.  - Change coreg to bisoprolol to allow for titration of BB with less drop in BP.  - LMWH,   - VANESSA-DCCV and AFL ablation on Monday PM, NPO Sunday MN.  - Counseled about new diagnosis of AFL. Pt has no awareness of his rapid HR    2. AFIB, only after ICDs hocks for AFL and rates were better controlled, so unlikely to lead to inappropriate ICD shocks.  - Will monitor rhythm over the weekend, if became predominantly in AFIb, then will consider adding AFIB ablation to the AFL ablation    3. NICM, will consider measuring filling pressures during ablation. Cardiology is involved. Could not tolerate Entresto earlier this month. High BNP. Possibly a component of AF induced as he had no symptoms during his AF. He has VDD ICD for almost 6 months and no prior AF reported, so this makes it less likely that he had high AF burden leading to NICM.   - Can consider genetic and/or cardiac MRI if not done  - Has primary prevention ICD. RV lead placement seems very high. Will do PA and lateral CXR to further assess lead position.      4. CAD, no current CP or signs of ACS. D/w Dr. Thompson, will defer repeat cath for now    5. Inappropriate ICD shocks. Device interrogation showed several episodes of AFL with atrial under-sensing leading to inappropriate identification of these events as VT and then ICD shocks.  - Changed his ICD setting to VF only zone that starts at 240 bpm to minimize risk of recurrent ICD shocks for AFL.   - Will reprogram post ablation. Will need to be on tele till then.    - If recurrent inappropriate shocks then contact EP and apply magnet and give IV BB.     Can NOT use CCB due to severe LV dysfunction Seen and examined, agree with above unless specified below. Spoke with Alexa Thompson and Neel. Offered patient the option of transfer to [facility] for his EP care, but he is refusing transfer and want his EP inpatient care to be done here and possibly even his outpatient care after.     1. New AFL, unlikely CTI dependant. Will be difficult to rate control, soft BP on Coreg, not a good candidate for AAT other than amiodarone. Has episodes of 1:1 AV conduction resulting in inappropriate ICD shocks. DCCV wont be helpful as patient was in and out of AFL several times with the ICD shocks, so likely to go back to AFL if did DCCV only.  - Change coreg to bisoprolol to allow for titration of BB with less drop in BP.  - LMWH,   - VANESSA-DCCV and AFL ablation on Monday PM, NPO Sunday MN.  - Counseled about new diagnosis of AFL. Pt has no awareness of his rapid HR    2. AFIB, only after ICDs hocks for AFL and rates were better controlled, so unlikely to lead to inappropriate ICD shocks.  - Will monitor rhythm over the weekend, if became predominantly in AFIb, then will consider adding AFIB ablation to the AFL ablation  - If the AFL turned out to be left sided then will favor adding PVI     3. NICM, will consider measuring filling pressures during ablation. Cardiology is involved. Could not tolerate Entresto earlier this month. High BNP. Possibly a component of AF induced as he had no symptoms during his AF. He has VDD ICD for almost 6 months and no prior AF reported, so this makes it less likely that he had high AF burden leading to NICM.   - Can consider genetic and/or cardiac MRI if not done  - Has primary prevention ICD. RV lead placement seems very high. Will do PA and lateral CXR to further assess lead position.      4. CAD, no current CP or signs of ACS. D/w Dr. Thompson, will defer repeat cath for now    5. Inappropriate ICD shocks. Device interrogation showed several episodes of AFL with atrial under-sensing leading to inappropriate identification of these events as VT and then ICD shocks.  - Changed his ICD setting to VF only zone that starts at 240 bpm to minimize risk of recurrent ICD shocks for AFL.   - Will reprogram post ablation. Will need to be on tele till then.    - If recurrent inappropriate shocks then contact EP and apply magnet and give IV BB.     Can NOT use CCB due to severe LV dysfunction

## 2021-11-19 NOTE — CONSULT NOTE ADULT - ASSESSMENT
62yo M w/ PMHx of CAD, Nonischemic Cardiomyopathy - CHF (EF in 2016 15-20%) s/p ICD (reports placed about 5 months ago by Dr. Shaikh), nonobstructive CAD, HTN admitted after his ICD fired multiple times, worsening dyspnea due to acute on chronic systolic congestive heart failure exacerbation likely due to dietary noncompliance

## 2021-11-19 NOTE — CONSULT NOTE ADULT - PROBLEM SELECTOR RECOMMENDATION 4
Patient w/ newly diagnosed A flutter, no prior history based on record  - currently in flutter on monitor  - [id number]: Patient w/ newly diagnosed A flutter, no prior history based on record  - currently in flutter on monitor  - [id number]: 2  Plan to control of the ventricular rate, reversion to normal sinus rhythm (NSR), maintenance of NSR and prevention of systemic embolization  - since patient is hemodynamically stable, he'll not require urgent catheter ablation, watchful waiting under anticoagulation and rate control medicines may be reasonable at this time  - sustained Atrial flutter while much less common than atrial fibrillation, carries an elevated thromboembolic risk  - start weight based Lovenox 1mg/kg BID  - rate control w/ Coreg 6.25mg oral BID w/ strict monitoring of BP and HR    case d/w Dr. Thompson

## 2021-11-19 NOTE — H&P ADULT - ASSESSMENT
61M with pmh of CAD NICM, HFrEF (15%) s/p AICD, HTN presented to ED c/o palpitations and defibrillator firing.    ICD discharge   -defibrillator discharged ~14 times at home per patient, no event on tele monitor so far   -lytes wnl, keep K>4.0 and Mg>2.0  -awaiting Biotronik rep to interrogate device in the am   -cont with tele monitor   -Cardiology consult noted     Atrial flutter with controlled response  -possibly w/ newly diagnosed A flutter as there is no prior history based on record  -currently in flutter with rate controlled on tele monitor  -s/p diltiazem and lopressor IVP   -[id number]: 2  -started on weight based Lovenox 1mg/Kg BID   -cont with oreg 6.25mg oral BID     Acute on chronic hypoxic respiratory failure   -multifactorial, in the setting of acute chf, underlying copd   -pt active smoker over 35yrs and recently started to cut down   -he states he been having smoker cough for several months now   -diffused wheezing on exam  -started on Duoneb, short course of Solu-Medrol, inhalers   -cont with NC for now   -need pulm eval as outpatient        Acute on chronic systolic CHF   -decompensated, orthopnea, +cough  -started Lasix 40 IV BID, cont lisinopril 25mg daily, increased coreg to 6.25mg BID  -strict I/Os, daily weights   -low Na diet, fluid restriction   -Cont NC 2L for now, titrated off as tolerated   -TTE from 10/28 noted, no need for repeat  -Cardiology following     HTN  -BP soft   -cont with Lasix, coreg and Lisinopril with strict holding parameter    DVT ppx   full dose lovenox       61M with pmh of CAD NICM, HFrEF (15%) s/p AICD, HTN presented to ED c/o palpitations and defibrillator firing.    ICD discharge   -defibrillator discharged ~14 times at home per patient, no event on tele monitor so far   -lytes wnl, keep K>4.0 and Mg>2.0  -awaiting Biotronik rep to interrogate device in the am   -cont with tele monitor   -Cardiology consult noted     Atrial flutter with controlled response  -possibly w/ newly diagnosed A flutter as there is no prior history based on record  -currently in flutter with rate controlled on tele monitor  -s/p diltiazem and lopressor IVP   -[id number]: 2  -started on weight based Lovenox 1mg/Kg BID   -cont with oreg 6.25mg oral BID     Acute on chronic hypoxic respiratory failure   -multifactorial, in the setting of acute chf, underlying copd   -pt active smoker over 35yrs and recently started to cut down   -he states he been having smoker cough for several months now   -diffused wheezing on exam  -started on Duoneb, short course of Solu-Medrol, inhalers   -cont with NC for now   -need pulm eval as outpatient        Acute on chronic systolic CHF   -decompensated, orthopnea, +cough  -started Lasix 40 IV BID, cont lisinopril 25mg daily, increased coreg to 6.25mg BID  -strict I/Os, daily weights   -low Na diet, fluid restriction   -Cont NC 2L for now, titrated off as tolerated   -TTE from 10/28 noted, no need for repeat  -Cardiology following     HTN  -BP soft   -cont with Lasix, coreg and Lisinopril with strict holding parameter    Nicotine use disorder   -active smoker   -nicotine patch ordered   -smoking cessation provided     DVT ppx   full dose lovenox

## 2021-11-19 NOTE — CONSULT NOTE ADULT - SUBJECTIVE AND OBJECTIVE BOX
[place] CARDIAC ELECTROPHYSIOLOGY  [facility]   Office: [address] [address]  Telephone: [phone] Fax:[phone]      61M with pmh of  HTN, nonobstructive CAD, NICM, HFrEF (15%) s/p Biotronik ICD (VDD; Neel; 6/2021),presented to [facility] ED c/o multiple ICD shocks. He states he was feeling well without complaint, seated outside of his new home talking to his roommate, when he suddenly felt like he had been punched in the chest. He was then shocked 13 more times over the next ~40 minutes. He states he felt fine prior to the first shock, with no chest pain, palpitation, dizziness, near/true syncope, although after a few shocks he began to feel shaky and lightheaded. On arrival, EMS reportedly found the pt to be in AFib w/ RVR and administered Diltiazem IV with rate control into the 90s. On arrival to [facility] ED, the patient was noted to be in atrial flutter and received Lopressor 5mg IVP. He currently remains in atrial flutter with general well controlled VR, but salvos of WCT most c/w AFL w/ aberrancy. ICD interrogation this AM demonstrated several ICD shocks for AFL w/ periods of 1:1 VR. EP is consulted for new AFL and inappropriate ICD shocks. Patient currently denies chest pain, shortness of breath at rest, near/true syncope, fevers/chills, N/V/D, or other cardiac or constitutional symptoms.       PAST MEDICAL & SURGICAL HISTORY:  Heart failure, systolic  CAD (coronary artery disease)  Hypertension  Nonischemic cardiomyopathy      REVIEW OF SYSTEMS:    CONSTITUTIONAL: No fever, weight loss, or fatigue  EYES: No visual disturbances  NECK: No pain or stiffness  RESPIRATORY: No cough, wheezing, chills or hemoptysis; No shortness of breath  CARDIOVASCULAR: see HPI  GASTROINTESTINAL: No abdominal or epigastric pain. No nausea, vomiting, or hematemesis; No diarrhea or constipation. No melena or hematochezia.  NEUROLOGICAL: No headaches, memory loss, loss of strength, numbness, or tremors  SKIN: No itching, burning, rashes, or lesions   LYMPH NODES: No enlarged glands  ENDOCRINE: No heat or cold intolerance; No hair loss  PSYCHIATRIC: No depression, anxiety, mood swings, or difficulty sleeping  HEME/LYMPH: No easy bruising, or bleeding gums      MEDICATIONS  (STANDING):  ALBUTerol    90 MICROgram(s) HFA Inhaler 2 Puff(s) Inhalation every 6 hours  albuterol/ipratropium for Nebulization 3 milliLiter(s) Nebulizer every 6 hours  aspirin enteric coated 81 milliGRAM(s) Oral daily  atorvastatin 40 milliGRAM(s) Oral at bedtime  budesonide 160 MICROgram(s)/formoterol 4.5 MICROgram(s) Inhaler 2 Puff(s) Inhalation two times a day  carvedilol 6.25 milliGRAM(s) Oral every 12 hours  enoxaparin Injectable 70 milliGRAM(s) SubCutaneous two times a day  furosemide   Injectable 40 milliGRAM(s) IV Push two times a day  influenza   Vaccine 0.5 milliLiter(s) IntraMuscular once  losartan 25 milliGRAM(s) Oral daily  methylPREDNISolone sodium succinate Injectable 40 milliGRAM(s) IV Push every 12 hours  nicotine -  14 mG/24Hr(s) Patch 1 patch Transdermal daily  tiotropium 18 MICROgram(s) Capsule 1 Capsule(s) Inhalation daily    MEDICATIONS  (PRN):  acetaminophen     Tablet .. 650 milliGRAM(s) Oral every 6 hours PRN Temp greater or equal to 38C (100.4F), Mild Pain (1 - 3)  aluminum hydroxide/magnesium hydroxide/simethicone Suspension 30 milliLiter(s) Oral every 4 hours PRN Dyspepsia  melatonin 3 milliGRAM(s) Oral at bedtime PRN Insomnia  ondansetron Injectable 4 milliGRAM(s) IV Push every 8 hours PRN Nausea and/or Vomiting      Allergies  No Known Allergies    Intolerances  pork (Other)      SOCIAL HISTORY: long-term smoker - currently smokes <1/2 pk per day; occasional ETOH; occasional marijuana; no illicit drug use.      FAMILY HISTORY:  lung cancer; No significant cardiac FHx      Vital Signs Last 24 Hrs  T(C): 37.1 (19 Nov 2021 11:42), Max: 37.4 (18 Nov 2021 20:45)  T(F): 98.7 (19 Nov 2021 11:42), Max: 99.3 (18 Nov 2021 20:45)  HR: 84 (19 Nov 2021 11:42) (80 - 99)  BP: 109/76 (19 Nov 2021 11:42) (94/73 - 127/74)  BP(mean): 85 (19 Nov 2021 00:35) (85 - 86)  RR: 18 (19 Nov 2021 11:42) (18 - 25)  SpO2: 100% (19 Nov 2021 11:42) (94% - 100%)    Physical Exam:  Constitutional: AAOx3, NAD  Neck: supple, No JVD  Cardiovascular: +S1S2 mildly tachycardic, mildly irregular, no m/g/r  Pulmonary: somewhat diminished to right base, but otherwise CTA b/l, unlabored, now/r/r  Abdomen: +BS, soft NTND  Extremities: trace pitting edema to b/l mid calves, +distal pulses b/l  Neuro: non focal, speech clear, JOHSNON x 4    LABS:                      15.3   5.47  )-----------( 165      ( 19 Nov 2021 07:49 )             47.7     138  |  99  |  32.1<H>  ----------------------------<  161<H>  4.3   |  22.0  |  1.29    Ca    8.7      19 Nov 2021 07:49  Phos  3.8     11-19  Mg     1.9     11-19    TPro  6.9  /  Alb  3.9  /  TBili  1.0  /  DBili  x   /  AST  31  /  ALT  20  /  AlkPhos  211<H>  11-18  LIVER FUNCTIONS - ( 18 Nov 2021 20:51 )  Alb: 3.9 g/dL / Pro: 6.9 g/dL / ALK PHOS: 211 U/L / ALT: 20 U/L / AST: 31 U/L / GGT: x           PT/INR - ( 18 Nov 2021 20:51 )   PT: 17.6 sec;   INR: 1.55 ratio       PTT - ( 18 Nov 2021 20:51 )  PTT:38.8 secCARDIAC MARKERS ( 18 Nov 2021 20:51 )  x     / 0.03 ng/mL / x     / x     / x          RADIOLOGY & ADDITIONAL STUDIES:  < from: TTE Echo Complete w/o Contrast w/ Doppler (10.28.21 @ 10:00) >  Summary     The left ventricle cavity is severely dilated.   Left ventricle systolic function appears severely and globally impaired;   segmental wall motion abnormalities noted.   Estimated EjectionFraction is 16 %.   The left atrium is mildly dilated.   The right atrium appears moderately dilated.   A device wire is seen in the RV and RA.   Normal appearing right ventricle structure and function.   A device wire is seen in the RV and RA.   Fibrocalcific changes noted to the Aortic valve leaflets with preserved   leaflet excursion.   Fibrocalcific changes noted to the mitral valve leaflets with preserved   leaflet excursion.   EA reversal of the mitral inflow consistent with reduced compliance of the   left ventricle.   Moderate to severe (3+) mitral regurgitation is present.   Normal appearing tricuspid valve structure.   Moderate to severe (3+) tricuspid valve regurgitation is present.   Moderate pulmonary hypertension.   No evidence of pericardial effusion.   IVC is dilated and not collapsing with inspiration.     Signature     ----------------------------------------------------------------   Electronically signed by Gordy Gould MD(Interpreting physician)   on 10/28/2021 06:55 PM  < end of copied text >      < from: Cardiac Cath Lab - Adult (06.22.16 @ 12:51) >  CORONARY VESSELS: The coronary circulation is right dominant.  LM:   --  LM: Angiography showed minor luminal irregularities with no flow  limiting lesions.  LAD:   --  Mid LAD: There was a 50 % stenosis.  CX:   --  Circumflex: Angiography showed minor luminal irregularities with  no flow limiting lesions.  RCA:   --  Proximal RCA: There was a 60 % stenosis.  --  Mid RCA: There was a 85 % stenosis.  COMPLICATIONS: There were no complications.  DIAGNOSTIC RECOMMENDATIONS: The patient should continue with the present  medications.  Prepared and signed by  Tahir Wilks M.D.    < end of copied text >     [place] CARDIAC ELECTROPHYSIOLOGY  [facility]   Office: [address] [address]  Telephone: [phone] Fax:[phone]      61M with pmh of  HTN, CAD (50%mLAD, 60%pRCA and 85% mRCA on cath 2016 - no intervention), NICM, HFrEF (15%) s/p Biotronik ICD (VDD; Neel; 6/2021),presented to [facility] ED c/o multiple ICD shocks. He states he was feeling well without complaint, seated outside of his new home talking to his roommate, when he suddenly felt like he had been punched in the chest. He was then shocked 13 more times over the next ~40 minutes. He states he felt fine prior to the first shock, with no chest pain, palpitation, dizziness, near/true syncope, although after a few shocks he began to feel shaky and lightheaded. On arrival, EMS reportedly found the pt to be in AFib w/ RVR and administered Diltiazem IV with rate control into the 90s. On arrival to [facility] ED, the patient was noted to be in atrial flutter and received Lopressor 5mg IVP. He currently remains in atrial flutter with general well controlled VR, but salvos of WCT most c/w AFL w/ aberrancy. ICD interrogation this AM demonstrated several ICD shocks for AFL w/ periods of 1:1 VR. EP is consulted for new AFL and inappropriate ICD shocks. Patient currently denies chest pain, shortness of breath at rest, near/true syncope, fevers/chills, N/V/D, or other cardiac or constitutional symptoms.       PAST MEDICAL & SURGICAL HISTORY:  Heart failure, systolic  CAD (coronary artery disease)  Hypertension  Nonischemic cardiomyopathy      REVIEW OF SYSTEMS:    CONSTITUTIONAL: No fever, weight loss, or fatigue  EYES: No visual disturbances  NECK: No pain or stiffness  RESPIRATORY: No cough, wheezing, chills or hemoptysis; No shortness of breath  CARDIOVASCULAR: see HPI  GASTROINTESTINAL: No abdominal or epigastric pain. No nausea, vomiting, or hematemesis; No diarrhea or constipation. No melena or hematochezia.  NEUROLOGICAL: No headaches, memory loss, loss of strength, numbness, or tremors  SKIN: No itching, burning, rashes, or lesions   LYMPH NODES: No enlarged glands  ENDOCRINE: No heat or cold intolerance; No hair loss  PSYCHIATRIC: No depression, anxiety, mood swings, or difficulty sleeping  HEME/LYMPH: No easy bruising, or bleeding gums      MEDICATIONS  (STANDING):  ALBUTerol    90 MICROgram(s) HFA Inhaler 2 Puff(s) Inhalation every 6 hours  albuterol/ipratropium for Nebulization 3 milliLiter(s) Nebulizer every 6 hours  aspirin enteric coated 81 milliGRAM(s) Oral daily  atorvastatin 40 milliGRAM(s) Oral at bedtime  budesonide 160 MICROgram(s)/formoterol 4.5 MICROgram(s) Inhaler 2 Puff(s) Inhalation two times a day  carvedilol 6.25 milliGRAM(s) Oral every 12 hours  enoxaparin Injectable 70 milliGRAM(s) SubCutaneous two times a day  furosemide   Injectable 40 milliGRAM(s) IV Push two times a day  influenza   Vaccine 0.5 milliLiter(s) IntraMuscular once  losartan 25 milliGRAM(s) Oral daily  methylPREDNISolone sodium succinate Injectable 40 milliGRAM(s) IV Push every 12 hours  nicotine -  14 mG/24Hr(s) Patch 1 patch Transdermal daily  tiotropium 18 MICROgram(s) Capsule 1 Capsule(s) Inhalation daily    MEDICATIONS  (PRN):  acetaminophen     Tablet .. 650 milliGRAM(s) Oral every 6 hours PRN Temp greater or equal to 38C (100.4F), Mild Pain (1 - 3)  aluminum hydroxide/magnesium hydroxide/simethicone Suspension 30 milliLiter(s) Oral every 4 hours PRN Dyspepsia  melatonin 3 milliGRAM(s) Oral at bedtime PRN Insomnia  ondansetron Injectable 4 milliGRAM(s) IV Push every 8 hours PRN Nausea and/or Vomiting      Allergies  No Known Allergies    Intolerances  pork (Other)      SOCIAL HISTORY: long-term smoker - currently smokes <1/2 pk per day; occasional ETOH; occasional marijuana; no illicit drug use.      FAMILY HISTORY:  lung cancer; No significant cardiac FHx      Vital Signs Last 24 Hrs  T(C): 37.1 (19 Nov 2021 11:42), Max: 37.4 (18 Nov 2021 20:45)  T(F): 98.7 (19 Nov 2021 11:42), Max: 99.3 (18 Nov 2021 20:45)  HR: 84 (19 Nov 2021 11:42) (80 - 99)  BP: 109/76 (19 Nov 2021 11:42) (94/73 - 127/74)  BP(mean): 85 (19 Nov 2021 00:35) (85 - 86)  RR: 18 (19 Nov 2021 11:42) (18 - 25)  SpO2: 100% (19 Nov 2021 11:42) (94% - 100%)    Physical Exam:  Constitutional: AAOx3, NAD  Neck: supple, No JVD  Cardiovascular: +S1S2 mildly tachycardic, mildly irregular, no m/g/r  Pulmonary: somewhat diminished to right base, but otherwise CTA b/l, unlabored, now/r/r  Abdomen: +BS, soft NTND  Extremities: trace pitting edema to b/l mid calves, +distal pulses b/l  Neuro: non focal, speech clear, JOHNSON x 4    LABS:                      15.3   5.47  )-----------( 165      ( 19 Nov 2021 07:49 )             47.7     138  |  99  |  32.1<H>  ----------------------------<  161<H>  4.3   |  22.0  |  1.29    Ca    8.7      19 Nov 2021 07:49  Phos  3.8     11-19  Mg     1.9     11-19    TPro  6.9  /  Alb  3.9  /  TBili  1.0  /  DBili  x   /  AST  31  /  ALT  20  /  AlkPhos  211<H>  11-18  LIVER FUNCTIONS - ( 18 Nov 2021 20:51 )  Alb: 3.9 g/dL / Pro: 6.9 g/dL / ALK PHOS: 211 U/L / ALT: 20 U/L / AST: 31 U/L / GGT: x           PT/INR - ( 18 Nov 2021 20:51 )   PT: 17.6 sec;   INR: 1.55 ratio       PTT - ( 18 Nov 2021 20:51 )  PTT:38.8 secCARDIAC MARKERS ( 18 Nov 2021 20:51 )  x     / 0.03 ng/mL / x     / x     / x          RADIOLOGY & ADDITIONAL STUDIES:  < from: TTE Echo Complete w/o Contrast w/ Doppler (10.28.21 @ 10:00) >  Summary     The left ventricle cavity is severely dilated.   Left ventricle systolic function appears severely and globally impaired;   segmental wall motion abnormalities noted.   Estimated EjectionFraction is 16 %.   The left atrium is mildly dilated.   The right atrium appears moderately dilated.   A device wire is seen in the RV and RA.   Normal appearing right ventricle structure and function.   A device wire is seen in the RV and RA.   Fibrocalcific changes noted to the Aortic valve leaflets with preserved   leaflet excursion.   Fibrocalcific changes noted to the mitral valve leaflets with preserved   leaflet excursion.   EA reversal of the mitral inflow consistent with reduced compliance of the   left ventricle.   Moderate to severe (3+) mitral regurgitation is present.   Normal appearing tricuspid valve structure.   Moderate to severe (3+) tricuspid valve regurgitation is present.   Moderate pulmonary hypertension.   No evidence of pericardial effusion.   IVC is dilated and not collapsing with inspiration.     Signature     ----------------------------------------------------------------   Electronically signed by Gordy Gould MD(Interpreting physician)   on 10/28/2021 06:55 PM  < end of copied text >      < from: Cardiac Cath Lab - Adult (06.22.16 @ 12:51) >  CORONARY VESSELS: The coronary circulation is right dominant.  LM:   --  LM: Angiography showed minor luminal irregularities with no flow  limiting lesions.  LAD:   --  Mid LAD: There was a 50 % stenosis.  CX:   --  Circumflex: Angiography showed minor luminal irregularities with  no flow limiting lesions.  RCA:   --  Proximal RCA: There was a 60 % stenosis.  --  Mid RCA: There was a 85 % stenosis.  COMPLICATIONS: There were no complications.  DIAGNOSTIC RECOMMENDATIONS: The patient should continue with the present  medications.  Prepared and signed by  Tahir Wilks M.D.    < end of copied text >

## 2021-11-19 NOTE — CONSULT NOTE ADULT - SUBJECTIVE AND OBJECTIVE BOX
[facility]                                                        Office: [address] [address]                                                       Telephone: [phone]. Fax:[phone]                                                              CARDIOLOGY CONSULTATION NOTE                                                                                             Consult requested by:      Reason for Consultation:     History obtained by: Patient and medical record     obtained: No    Chief complaint:  AICD fired     HPI: Patient is a 60yo M with PMHx of CAD, Nonischemic Cardiomyopathy - CHF (EF in 2016 15-20%) s/p ICD (reports placed about 5 months ago by Dr. Shaikh), nonobstructive CAD, HTN admitted after his ICD fired multiple times last night.  Patient states he was home with friends and suddenly felt his defibrillator fire.  Patient remembers device "fired 14 times over the course of a half hour or so."  On monitor EMS found patient in A Fib w/ RVR and administered Diltiazem IV with rate control into the 90s.  Currently w/ A Flutter on Tele monitor and ECG also new onset A Flutter.  Patient denies any symptoms prior to device activation.  Admits to feeling SOB, +orthopnea, +cough and led swelling.  Patient lives at a shelter where he has a high sodium diet.  No chest pain, fever, sick contacts reported.  Goes to Cardio - [facility] - Dr. Rosas    REVIEW OF SYMPTOMS:   Cardiovascular: See HPI. No chest pain, + dyspnea, No syncope, + palpitations, no dizziness, no Orthopnea, no Paroxsymal nocturnal dyspnea;    Respiratory: + Dyspnea, No cough,     Genitourinary: No dysuria, no hematuria;   Gastrointestinal: No nausea, no vomiting. No diarrhea, No abdominal pain, No dark color stool, no melena;   Neurological: No headache, no dizziness, no slurred speech;    Psychiatric: No agitation, no anxiety.  ALL OTHER REVIEW OF SYSTEMS ARE NEGATIVE.    ALLERGIES: No Known Allergies    CURRENT MEDICATIONS:  enoxaparin Injectable    HOME MEDICATIONS:    PAST MEDICAL HISTORY  Heart failure, systolic  CAD (coronary artery disease)  Hypertension  Nonischemic cardiomyopathy    PAST SURGICAL HISTORY  s/p ICD    FAMILY HISTORY:  No pertinent family history in first degree relatives    SOCIAL HISTORY:  Denies smoking/alcohol/drugs    Vital Signs Last 24 Hrs  T(C): 37.4 (18 Nov 2021 20:45), Max: 37.4 (18 Nov 2021 20:45)  T(F): 99.3 (18 Nov 2021 20:45), Max: 99.3 (18 Nov 2021 20:45)  HR: 92 (19 Nov 2021 00:35) (80 - 92)  BP: 109/73 (19 Nov 2021 00:35) (94/73 - 127/74)  BP(mean): 85 (19 Nov 2021 00:35) (85 - 86)  RR: 20 (19 Nov 2021 00:35) (20 - 25)  SpO2: 94% (19 Nov 2021 00:35) (94% - 99%)    PHYSICAL EXAM:  Constitutional: Comfortable . No acute distress.   HEENT: Atraumatic and normcephalic , neck is supple . no JVD. No carotid bruit. PEERL   CNS: A&Ox3. No focal deficits. EOMI. Cranial nerves II-IX are intact.   Lymph Nodes: Cervical : Not palpable.  Respiratory: CTAB  Cardiovascular: S1S2 RRR. No murmur/rubs or gallop.  Gastrointestinal: Soft non-tender and non distended . +Bowel sounds. negative Diaz's sign.  Extremities: No edema.   Psychiatric: Calm . no agitation.  Skin: No skin rash/ulcers visualized to face, hands or feet.    Intake and output:     LABS:                        15.4   5.30  )-----------( 182      ( 18 Nov 2021 20:51 )             47.8     11-18    140  |  99  |  28.1<H>  ----------------------------<  97  3.7   |  24.0  |  1.17    Ca    8.7      18 Nov 2021 20:51  Phos  2.9     11-18  Mg     2.0     11-18    TPro  6.9  /  Alb  3.9  /  TBili  1.0  /  DBili  x   /  AST  31  /  ALT  20  /  AlkPhos  211<H>  11-18    CARDIAC MARKERS ( 18 Nov 2021 20:51 )  x     / 0.03 ng/mL / x     / x     / x        ;p-BNP=Serum Pro-Brain Natriuretic Peptide: 70788 pg/mL (11-18 @ 20:51)    PT: 17.6 sec;   INR: 1.55 ratio   PTT:38.8 sec    INTERPRETATION OF TELEMETRY:  A Flutter   ECG:  A flutter at 80bpm, LAD, non specific T and ST changes    RADIOLOGY & ADDITIONAL STUDIES:   X-ray: Pen      ECHO FINDINGS: Date:    Summary   The left ventricle cavity is severely dilated.   Left ventricle systolic function appears severely and globally impaired;   segmental wall motion abnormalities noted.   Estimated Ejection Fraction is 16 %.   The left atrium is mildly dilated.   The right atrium appears moderately dilated.   A device wire is seen in the RV and RA.   Normal appearing right ventricle structure and function.   A device wire is seen in the RV and RA.   Fibrocalcific changes noted to the Aortic valve leaflets with preserved   leaflet excursion.   Fibrocalcific changes noted to the mitral valve leaflets with preserved   leaflet excursion.   EA reversal of the mitral inflow consistent with reduced compliance of the   left ventricle.   Moderate to severe (3+) mitral regurgitation is present.   Normal appearing tricuspid valve structure.   Moderate to severe (3+) tricuspid valve regurgitation is present.   Moderate pulmonary hypertension.   No evidence of pericardial effusion.   IVC is dilated and not collapsing with inspiration.     Electronically signed by Gordy Gould MD(Interpreting physician) on 10/28/2021                                                                       [facility]                                                        Office: [address] [address]                                                       Telephone: [phone]. Fax:[phone]                                                              CARDIOLOGY CONSULTATION NOTE                                                                                             Consult requested by:  ER     Reason for Consultation: Nonischemic cardiomyopathy (EF 15-20%)      History obtained by: Patient and medical record     obtained: No    Chief complaint:  AICD fired     HPI: Patient is a 62yo M with PMHx of CAD, Nonischemic Cardiomyopathy - CHF (EF in 2016 15-20%) s/p ICD (reports placed about 5 months ago by Dr. Shaikh), nonobstructive CAD, HTN admitted after his ICD fired multiple times last night.  Patient states he was home with friends and suddenly felt his defibrillator fire.  Patient remembers device "fired 14 times over the course of a half hour or so."  On monitor EMS found patient in A Fib w/ RVR and administered Diltiazem IV with rate control into the 90s.  Currently w/ A Flutter on Tele monitor and ECG also new onset A Flutter.  Patient denies any symptoms prior to device activation.  Admits to feeling SOB, +orthopnea, +cough and led swelling.  Patient lives at a shelter where he has a high sodium diet.  No chest pain, fever, sick contacts reported.  Goes to Cardio - [facility] - Dr. Rosas    REVIEW OF SYMPTOMS:   Cardiovascular: See HPI. No chest pain, + dyspnea, No syncope, + palpitations, no dizziness, no Orthopnea, no Paroxsymal nocturnal dyspnea;    Respiratory: + Dyspnea, No cough,     Genitourinary: No dysuria, no hematuria;   Gastrointestinal: No nausea, no vomiting. No diarrhea, No abdominal pain, No dark color stool, no melena;   Neurological: No headache, no dizziness, no slurred speech;    Psychiatric: No agitation, no anxiety.  ALL OTHER REVIEW OF SYSTEMS ARE NEGATIVE.    ALLERGIES: No Known Allergies    CURRENT MEDICATIONS:  enoxaparin Injectable    HOME MEDICATIONS:    PAST MEDICAL HISTORY  Heart failure, systolic  CAD (coronary artery disease)  Hypertension  Nonischemic cardiomyopathy    PAST SURGICAL HISTORY  s/p ICD    FAMILY HISTORY:  No pertinent family history in first degree relatives    SOCIAL HISTORY:  Denies smoking/alcohol/drugs    Vital Signs Last 24 Hrs  T(C): 37.4 (18 Nov 2021 20:45), Max: 37.4 (18 Nov 2021 20:45)  T(F): 99.3 (18 Nov 2021 20:45), Max: 99.3 (18 Nov 2021 20:45)  HR: 92 (19 Nov 2021 00:35) (80 - 92)  BP: 109/73 (19 Nov 2021 00:35) (94/73 - 127/74)  BP(mean): 85 (19 Nov 2021 00:35) (85 - 86)  RR: 20 (19 Nov 2021 00:35) (20 - 25)  SpO2: 94% (19 Nov 2021 00:35) (94% - 99%)    PHYSICAL EXAM:  Constitutional: Comfortable . No acute distress.   HEENT: Atraumatic and normcephalic , neck is supple . no JVD. No carotid bruit. PEERL   CNS: A&Ox3. No focal deficits. EOMI. Cranial nerves II-IX are intact.   Lymph Nodes: Cervical : Not palpable.  Respiratory: CTAB  Cardiovascular: S1S2 RRR. No murmur/rubs or gallop.  Gastrointestinal: Soft non-tender and non distended . +Bowel sounds. negative Diaz's sign.  Extremities: No edema.   Psychiatric: Calm . no agitation.  Skin: No skin rash/ulcers visualized to face, hands or feet.    Intake and output:     LABS:                        15.4   5.30  )-----------( 182      ( 18 Nov 2021 20:51 )             47.8     11-18    140  |  99  |  28.1<H>  ----------------------------<  97  3.7   |  24.0  |  1.17    Ca    8.7      18 Nov 2021 20:51  Phos  2.9     11-18  Mg     2.0     11-18    TPro  6.9  /  Alb  3.9  /  TBili  1.0  /  DBili  x   /  AST  31  /  ALT  20  /  AlkPhos  211<H>  11-18    CARDIAC MARKERS ( 18 Nov 2021 20:51 )  x     / 0.03 ng/mL / x     / x     / x        ;p-BNP=Serum Pro-Brain Natriuretic Peptide: 93121 pg/mL (11-18 @ 20:51)    PT: 17.6 sec;   INR: 1.55 ratio   PTT:38.8 sec    INTERPRETATION OF TELEMETRY:  A Flutter   ECG:  A flutter at 80bpm, LAD, non specific T and ST changes    RADIOLOGY & ADDITIONAL STUDIES:   X-ray: Pen      ECHO FINDINGS: Date:    Summary   The left ventricle cavity is severely dilated.   Left ventricle systolic function appears severely and globally impaired;   segmental wall motion abnormalities noted.   Estimated Ejection Fraction is 16 %.   The left atrium is mildly dilated.   The right atrium appears moderately dilated.   A device wire is seen in the RV and RA.   Normal appearing right ventricle structure and function.   A device wire is seen in the RV and RA.   Fibrocalcific changes noted to the Aortic valve leaflets with preserved   leaflet excursion.   Fibrocalcific changes noted to the mitral valve leaflets with preserved   leaflet excursion.   EA reversal of the mitral inflow consistent with reduced compliance of the   left ventricle.   Moderate to severe (3+) mitral regurgitation is present.   Normal appearing tricuspid valve structure.   Moderate to severe (3+) tricuspid valve regurgitation is present.   Moderate pulmonary hypertension.   No evidence of pericardial effusion.   IVC is dilated and not collapsing with inspiration.     Electronically signed by Gordy Gould MD(Interpreting physician) on 10/28/2021

## 2021-11-19 NOTE — CONSULT NOTE ADULT - TIME BILLING
Obstructive CAD/ Ischemic cardiomyopathy (EF 16%) s/p Biotronik AICD (single lead device), New onset Atrial flutter (CHADS-VAsc 2 points  Stroke risk was 2.2% per year), and HTN

## 2021-11-19 NOTE — H&P ADULT - HISTORY OF PRESENT ILLNESS
61M with pmh of CAD NICM, HFrEF (15%) s/p AICD, HTN presented to ED c/o palpitations and AICD firing.  61M with pmh of CAD NICM, HFrEF (15%) s/p AICD, HTN presented to ED c/o palpitations and AICD firing. Pt reports to ICD firing 15 times. Pt states he just arrived to [facility] and was outside talking to one of the guys that lives in the same house as him when he suddenly felt sharp electrical shock in his chest, this happed 14 times within 40 minutes with associated palpitations and lightheadedness. Pt stated he had AICD placed 6month ago by Dr. Shaikh at [facility] and never experiencing defibrillator firing until tody. Pt also reports to productive cough, worsening exertional dyspnea. He was admitted to  for chf discharged on 11/3 to [facility], states his dietary indiscretion has been very poor as he lives from [facility]. He admits to medication compliance. Denies cp, fever, chills, abdominal pain.   61M with pmh of CAD NICM, HFrEF (15%) s/p AICD, HTN presented to ED c/o palpitations and defibrillator firing. Pt reports to ICD firing 15 times. Pt states he just arrived to [facility] and was outside talking to one of the guys that lives in the same house as him when he suddenly felt sharp electrical shock in his chest, this happed 14 times within 40 minutes with associated palpitations and lightheadedness. Pt stated he had AICD placed 6month ago by Dr. Shaikh at [facility] and never experiencing defibrillator firing until today. Pt also reports to productive cough, worsening exertional dyspnea. He was admitted to  for chf discharged on 11/3 to [facility], states his dietary indiscretion has been very poor as he lives from [facility]. He admits to medications compliance. Denies cp, fever, chills, abdominal pain. as per EMS pt was in  AFib w/ RVR and administered Diltiazem IV with rate control into the 90s and pt was given lopressor 5mg IVP in the ED.  Currently w/ A Flutter on Tele monitor and ECG also new onset A Flutter as there is no reports of prior hx.

## 2021-11-19 NOTE — CHART NOTE - NSCHARTNOTEFT_GEN_A_CORE
61 year old male with PMH HTN, CAD, NICM s/p ICD admitted with ICD shocks.  patient chart reviewed, patient seen and examined in ER CDU 15L  Awaiting interrogation, no recurrent shocks  A/P as outlines in admission H&P  Hospital service will follow

## 2021-11-20 LAB
A1C WITH ESTIMATED AVERAGE GLUCOSE RESULT: 6.3 % — HIGH (ref 4–5.6)
ALBUMIN SERPL ELPH-MCNC: 3.9 G/DL — SIGNIFICANT CHANGE UP (ref 3.3–5.2)
ALP SERPL-CCNC: 202 U/L — HIGH (ref 40–120)
ALT FLD-CCNC: 23 U/L — SIGNIFICANT CHANGE UP
ANION GAP SERPL CALC-SCNC: 15 MMOL/L — SIGNIFICANT CHANGE UP (ref 5–17)
AST SERPL-CCNC: 39 U/L — SIGNIFICANT CHANGE UP
BILIRUB SERPL-MCNC: 0.9 MG/DL — SIGNIFICANT CHANGE UP (ref 0.4–2)
BUN SERPL-MCNC: 43.6 MG/DL — HIGH (ref 8–20)
CALCIUM SERPL-MCNC: 8.8 MG/DL — SIGNIFICANT CHANGE UP (ref 8.6–10.2)
CHLORIDE SERPL-SCNC: 98 MMOL/L — SIGNIFICANT CHANGE UP (ref 98–107)
CK MB CFR SERPL CALC: 4.4 NG/ML — SIGNIFICANT CHANGE UP (ref 0–6.7)
CK SERPL-CCNC: 458 U/L — HIGH (ref 30–200)
CO2 SERPL-SCNC: 22 MMOL/L — SIGNIFICANT CHANGE UP (ref 22–29)
COVID-19 NUCLEOCAPSID GAM AB INTERP: NEGATIVE — SIGNIFICANT CHANGE UP
COVID-19 NUCLEOCAPSID TOTAL GAM ANTIBODY RESULT: 0.09 INDEX — SIGNIFICANT CHANGE UP
COVID-19 SPIKE DOMAIN AB INTERP: POSITIVE
COVID-19 SPIKE DOMAIN ANTIBODY RESULT: 249 U/ML — HIGH
CREAT SERPL-MCNC: 1.28 MG/DL — SIGNIFICANT CHANGE UP (ref 0.5–1.3)
ESTIMATED AVERAGE GLUCOSE: 134 MG/DL — HIGH (ref 68–114)
GLUCOSE SERPL-MCNC: 170 MG/DL — HIGH (ref 70–99)
HCT VFR BLD CALC: 46.9 % — SIGNIFICANT CHANGE UP (ref 39–50)
HGB BLD-MCNC: 15 G/DL — SIGNIFICANT CHANGE UP (ref 13–17)
MAGNESIUM SERPL-MCNC: 2.2 MG/DL — SIGNIFICANT CHANGE UP (ref 1.6–2.6)
MCHC RBC-ENTMCNC: 29.2 PG — SIGNIFICANT CHANGE UP (ref 27–34)
MCHC RBC-ENTMCNC: 32 GM/DL — SIGNIFICANT CHANGE UP (ref 32–36)
MCV RBC AUTO: 91.2 FL — SIGNIFICANT CHANGE UP (ref 80–100)
PLATELET # BLD AUTO: 174 K/UL — SIGNIFICANT CHANGE UP (ref 150–400)
POTASSIUM SERPL-MCNC: 5.1 MMOL/L — SIGNIFICANT CHANGE UP (ref 3.5–5.3)
POTASSIUM SERPL-SCNC: 5.1 MMOL/L — SIGNIFICANT CHANGE UP (ref 3.5–5.3)
PROT SERPL-MCNC: 6.8 G/DL — SIGNIFICANT CHANGE UP (ref 6.6–8.7)
RBC # BLD: 5.14 M/UL — SIGNIFICANT CHANGE UP (ref 4.2–5.8)
RBC # FLD: 15.5 % — HIGH (ref 10.3–14.5)
SARS-COV-2 IGG+IGM SERPL QL IA: 0.09 INDEX — SIGNIFICANT CHANGE UP
SARS-COV-2 IGG+IGM SERPL QL IA: 249 U/ML — HIGH
SARS-COV-2 IGG+IGM SERPL QL IA: NEGATIVE — SIGNIFICANT CHANGE UP
SARS-COV-2 IGG+IGM SERPL QL IA: POSITIVE
SODIUM SERPL-SCNC: 135 MMOL/L — SIGNIFICANT CHANGE UP (ref 135–145)
TROPONIN T SERPL-MCNC: 0.04 NG/ML — SIGNIFICANT CHANGE UP (ref 0–0.06)
TROPONIN T SERPL-MCNC: 0.04 NG/ML — SIGNIFICANT CHANGE UP (ref 0–0.06)
TSH SERPL-MCNC: 1.67 UIU/ML — SIGNIFICANT CHANGE UP (ref 0.27–4.2)
WBC # BLD: 8.14 K/UL — SIGNIFICANT CHANGE UP (ref 3.8–10.5)
WBC # FLD AUTO: 8.14 K/UL — SIGNIFICANT CHANGE UP (ref 3.8–10.5)

## 2021-11-20 PROCEDURE — 99233 SBSQ HOSP IP/OBS HIGH 50: CPT

## 2021-11-20 PROCEDURE — 93010 ELECTROCARDIOGRAM REPORT: CPT

## 2021-11-20 PROCEDURE — 99232 SBSQ HOSP IP/OBS MODERATE 35: CPT

## 2021-11-20 PROCEDURE — 93306 TTE W/DOPPLER COMPLETE: CPT | Mod: 26

## 2021-11-20 RX ORDER — MAGNESIUM SULFATE 500 MG/ML
2 VIAL (ML) INJECTION ONCE
Refills: 0 | Status: COMPLETED | OUTPATIENT
Start: 2021-11-20 | End: 2021-11-30

## 2021-11-20 RX ORDER — METOPROLOL TARTRATE 50 MG
5 TABLET ORAL ONCE
Refills: 0 | Status: COMPLETED | OUTPATIENT
Start: 2021-11-20 | End: 2021-11-20

## 2021-11-20 RX ADMIN — Medication 40 MILLIGRAM(S): at 18:07

## 2021-11-20 RX ADMIN — Medication 3 MILLILITER(S): at 16:04

## 2021-11-20 RX ADMIN — Medication 3 MILLILITER(S): at 20:28

## 2021-11-20 RX ADMIN — ATORVASTATIN CALCIUM 40 MILLIGRAM(S): 80 TABLET, FILM COATED ORAL at 22:06

## 2021-11-20 RX ADMIN — ENOXAPARIN SODIUM 70 MILLIGRAM(S): 100 INJECTION SUBCUTANEOUS at 18:06

## 2021-11-20 RX ADMIN — Medication 40 MILLIGRAM(S): at 18:06

## 2021-11-20 RX ADMIN — Medication 81 MILLIGRAM(S): at 13:16

## 2021-11-20 RX ADMIN — BISOPROLOL FUMARATE 5 MILLIGRAM(S): 10 TABLET, FILM COATED ORAL at 22:06

## 2021-11-20 RX ADMIN — Medication 40 MILLIGRAM(S): at 05:26

## 2021-11-20 NOTE — PROGRESS NOTE ADULT - ASSESSMENT
· Assessment	  61M with pmh of  HTN, CAD (50%mLAD, 60%pRCA and 85% mRCA on cath 2016 - no intervention), NICM, HFrEF (15%) s/p Biotronik ICD (VDD; [facility]; 6/2021),presented to [facility] ED w/ new atrial tachycardia (atrial flutter- possibly atypical) and several inappropriate ICD shocks for AFL w/ periods of rapid conduction.     Planning for ablation of atrial arrhythmia on Monday. Mainteance of sinus rhythm needed to improve CHF status, improve symptoms, and reduce indicidence of further ICD shocks.   -VANESSA and ablation Monday, keep NPO p MD. arrhythmia may be atypical flutter, in which case LA ablation including PVI may be needed.   -continue bisoprolol for HR control. increase dose to 5mg tonight. can increase 5mg bid if more HR needed in interim and BP can tolerate.   -continue lovenox through sunday night. do not dose monday am. Will start DOAC medication following ablation.

## 2021-11-20 NOTE — PROGRESS NOTE ADULT - SUBJECTIVE AND OBJECTIVE BOX
[facility]                                                        Office: [address] [address]                                                       Telephone: [phone]. Fax:[phone]                                                                             PROGRESS NOTE   Reason for follow up:    Palipations                            Overnight: No new events.   Update:   No further palpitations seen by EP and planned for ablation on Monday and a RHC.  TTE today EF < 20%,  Moderate to severe mitral valve regurgitation, and boarder line Pulmonary HTN.    Subjective: "My friend had this as well"   Complains of:  Nothing  Review of symptoms: Cardiac:  No chest pain. No dyspnea. No palpitations.  Respiratory: no cough. No dyspnea  Gastrointestinal: No diarrhea. No abdominal pain. No bleeding.     Past medical history: No updates.   Chronic conditions:  PAST MEDICAL & SURGICAL HISTORY:  Heart failure, systolic  CAD (coronary artery disease)  Hypertension  Nonischemic cardiomyopathy  No significant past surgical history  	  Vitals:  T(C): 36.6 (11-20-21 @ 10:15), Max: 36.8 (11-19-21 @ 19:23)  HR: 93 (11-20-21 @ 16:04) (66 - 133)  BP: 113/76 (11-20-21 @ 10:15) (98/71 - 113/76)  RR: 17 (11-20-21 @ 10:15) (17 - 20)  SpO2: 95% (11-20-21 @ 16:04) (92% - 100%)    I&O's Summary  19 Nov 2021 07:01  -  20 Nov 2021 07:00  --------------------------------------------------------  IN: 0 mL / OUT: 100 mL / NET: -100 mL    Weight (kg): 72.6 (11-19 @ 22:21)    PHYSICAL EXAM:  Appearance: Comfortable. No acute distress, thin male  HEENT:  Head and neck: Atraumatic. Normocephalic.  Normal oral mucosa, PERRL, Neck is supple. No JVD  Neurologic: A & O x 3, no focal deficits. EOMI , Cranial nerves are intact.  Lymphatic: No cervical lymphadenopathy  Cardiovascular: Normal S1 S2, No murmur, rubs/gallops. No JVD, No edema  Respiratory: Lungs clear to auscultation  Gastrointestinal:  Soft, Non-tender, + BS  Lower Extremities: No edema  Psychiatry: Patient is calm. No agitation. Mood & affect appropriate  Skin: No rashes/ ecchymoses/cyanosis/ulcers visualized on the face, hands or feet.    CURRENT MEDICATIONS:  bisoprolol   Tablet 2.5 milliGRAM(s) Oral once  bisoprolol   Tablet 5 milliGRAM(s) Oral at bedtime  furosemide   Injectable 40 milliGRAM(s) IV Push two times a day  losartan 25 milliGRAM(s) Oral daily  ALBUTerol    90 MICROgram(s) HFA Inhaler  albuterol/ipratropium for Nebulization  budesonide 160 MICROgram(s)/formoterol 4.5 MICROgram(s) Inhaler  tiotropium 18 MICROgram(s) Capsule  atorvastatin  methylPREDNISolone sodium succinate Injectable  aspirin enteric coated  enoxaparin Injectable  influenza   Vaccine  magnesium sulfate  IVPB    LABS:	 	  CARDIAC MARKERS ( 20 Nov 2021 14:02 )  x     / 0.04 ng/mL / x     / x     / x      p-BNP 20 Nov 2021 14:02: x    , CARDIAC MARKERS ( 20 Nov 2021 07:58 )  x     / 0.04 ng/mL / 458 U/L / x     / 4.4 ng/mL  p-BNP 20 Nov 2021 07:58: x    , CARDIAC MARKERS ( 18 Nov 2021 20:51 )  x     / 0.03 ng/mL / x     / x     / x      p-BNP 18 Nov 2021 20:51: 52724 pg/mL                       15.0   8.14  )-----------( 174      ( 20 Nov 2021 07:58 )             46.9   11-20  135  |  98  |  43.6<H>  ----------------------------<  170<H>  5.1   |  22.0  |  1.28  Ca    8.8      20 Nov 2021 07:58  Phos  3.8     11-19  Mg     2.2     11-20  TPro  6.8  /  Alb  3.9  /  TBili  0.9  /  DBili  x   /  AST  39  /  ALT  23  /  AlkPhos  202<H>  11-20  proBNP: Serum Pro-Brain Natriuretic Peptide: 23889 pg/mL (11-18 @ 20:51)  HgA1c: TSH: Thyroid Stimulating Hormone, Serum: 1.67 uIU/mL    TELEMETRY: Reviewed    ECG:  Reviewed by me. 	    Summary:   1. Left ventricular ejection fraction, by visual estimation, is <20%.   2. Technically good study.   3. Severely enlarged left atrium.   4. Severely enlarged right atrium.   5. Severely enlarged right ventricle.   6. Severely reduced RV systolic function.   7. Moderate to severe mitral valve regurgitation.   8. Thickening of the anterior mitral valve leaflet.   9. Moderate tricuspid regurgitation.  10. Estimated pulmonary artery systolic pressure is 37.8 mmHg assuming a right atrial pressure of 8 mmHg, which is consistent with borderline pulmonary hypertension.

## 2021-11-20 NOTE — PROGRESS NOTE ADULT - ASSESSMENT
62yo M w/ PMHx of CAD, Nonischemic Cardiomyopathy - CHF (EF in 2016 15-20%) s/p ICD (reports placed about 5 months ago by Dr. Shaikh),and  HTN admitted after his ICD fired multiple times, worsening dyspnea , pt is seen by cardiology and EPS found to have A flutter       1-ICD discharge   -defibrillator discharged ~14 times at home per patient  EP consult appreciated, interrogation adjustment to AICD done   monitor electrolytes  keep K>4.0 and Mg>2.0  -cont with tele monitor   for ablation on monday     2-Atrial flutter with high rate - NSR   -s/p diltiazem and lopressor IVP   cont  Lovenox 1mg/Kg BID   -on Coreg  6.25mg oral BID   EP follow up re rate control, antiarrythmic use     3- CAD with CP this am associated   -troponin neg , serial cardiac enzymes ordered        4-Acute on chronic systolic CHF   -decompensated, orthopnea, +cough  cont  Lasix 40 IV BID, cont lisinopril 25mg daily and coreg   -strict I/Os, daily weights   -low Na diet, fluid restriction   -Cont NC 2L for now, titrated off as tolerated   -TTE from 10/28 noted  -Cardiology following     5- Respiratory failure acute hypoxic   likely  multifactorial CHF , A flutter , underlying lung disease ? active smoker   cont neb therapy s/p short course iv steroid   counseling provided to quite smoking     6-HTN, soft controlled   -cont with Lasix, coreg and Lisinopril with strict holding parameter    7-Nicotine use disorder   -nicotine patch ordered   -smoking cessation provided     DVT ppx   full dose lovenox for Atrial flutter

## 2021-11-20 NOTE — PROGRESS NOTE ADULT - SUBJECTIVE AND OBJECTIVE BOX
Patient is a 61y old  Male admitted for Aflutter, ICD firing   seen earlier today ., he is with c/o not getting good sleep , mild left sided CP since this am associated with palpitations     monitor reviewed ,  SR at present     Vital Signs Last 24 Hrs  T(C): 36.6 (20 Nov 2021 10:15), Max: 37.2 (19 Nov 2021 17:41)  T(F): 97.9 (20 Nov 2021 10:15), Max: 99 (19 Nov 2021 17:41)  HR: 93 (20 Nov 2021 10:15) (66 - 133)  BP: 113/76 (20 Nov 2021 10:15) (94/77 - 113/76)  BP(mean): 80 (20 Nov 2021 06:03) (80 - 80)  RR: 17 (20 Nov 2021 10:15) (17 - 20)  SpO2: 95% (20 Nov 2021 10:15) (92% - 100%)    PHYSICAL EXAM:  General: awake alert lying in the bed   ENT: moist , oxygen via NC   Neck: supple ,no JVD   Lungs: CTA bilateral   Cardio: RRR, S1/S2, No murmur  Abdomen: Soft, Nontender, Nondistended; Bowel sounds present  Extremities: No calf tenderness, No pitting edema        LABS:                        15.0   8.14  )-----------( 174      ( 20 Nov 2021 07:58 )             46.9     11-20    135  |  98  |  43.6  ----------------------------<  170  5.1   |  22.0  |  1.28    Ca    8.8      20 Nov 2021 07:58  Phos  3.8     11-19  Mg     1.9     11-19    TPro  6.8  /  Alb  3.9  /  TBili  0.9  /  DBili  x   /  AST  39  /  ALT  23  /  AlkPhos  202  11-20        eGFR if Non African American: 61 mL/min/1.73M2 (11-20-21 @ 07:58)  eGFR if : 71 mL/min/1.73M2 (11-20-21 @ 07:58)    PT/INR - ( 18 Nov 2021 20:51 )   PT: 17.6 sec;   INR: 1.55 ratio         PTT - ( 18 Nov 2021 20:51 )  PTT:38.8 sec      CARDIAC MARKERS ( 20 Nov 2021 07:58 )  x     / 0.04 ng/mL / 458 U/L / x     / 4.4 ng/mL  CARDIAC MARKERS ( 18 Nov 2021 20:51 )  x     / 0.03 ng/mL / x     / x     / x          10-28 Chol 104 mg/dL LDL -- HDL 23 mg/dL Trig 55 mg/dL                      COVID-19 PCR: NotDetec (11-18-21 @ 20:48)  COVID-19 PCR: NotDetec (11-01-21 @ 06:21)    RADIOLOGY & ADDITIONAL TESTS:

## 2021-11-20 NOTE — CHART NOTE - NSCHARTNOTEFT_GEN_A_CORE
RN called to report patient having chest pain and HR accelerated and sustaining 135 afib.    Patient seen at bedside was up in bathroom ambulated with mild sob noted.  States pain is subsiding midsternal 2/10.  RN held B/P medications for hypotension    Vital Signs Last 24 Hrs  T(C): 36.6 (20 Nov 2021 06:03), Max: 37.2 (19 Nov 2021 17:41)  T(F): 97.9 (20 Nov 2021 06:03), Max: 99 (19 Nov 2021 17:41)  HR: 133 (20 Nov 2021 06:03) (66 - 133)  BP: 98/71 (20 Nov 2021 06:03) (94/77 - 109/76)  BP(mean): 80 (20 Nov 2021 06:03) (80 - 80)  RR: 20 (20 Nov 2021 06:03) (18 - 20)  SpO2: 94% (20 Nov 2021 06:03) (92% - 100%)    Plan:  Stat EKG ordered  CE ordered  Cardio Consult called spoke with JOSE CRUZ De who will follow up.

## 2021-11-20 NOTE — CHART NOTE - NSCHARTNOTEFT_GEN_A_CORE
Call by medicine PA pt co chest pain    At my arrival pt asleep conformably. States chest pain and palpitations stated after he received IV steroids this AM. Chest pain was midsternal, non-radiated, 3/10. Pt currently asymptomatic. Chest pain resolved                                    REVIEW OF SYMPTOMS: See HPI.  Cardiovascular:  No chest pain,  No dyspnea,  No fatigue, No syncope,  No palpitations, No dizziness, No Orthopnea, No Paroxsymal nocturnal dyspnea.  Respiratory:  No Dyspnea, No cough.  Genitourinary:  No dysuria, no hematuria.  Gastrointestinal:  No nausea, no vomiting. No diarrhea.  No abdominal pain.   Neurological: No headache, no dizziness, no slurred speech.  Psychiatric: No agitation, no anxiety.    ALL OTHER REVIEW OF SYSTEMS ARE NEGATIVE.    Vital Signs Last 24 Hrs  T(C): 36.6 (20 Nov 2021 06:03), Max: 37.2 (19 Nov 2021 17:41)  T(F): 97.9 (20 Nov 2021 06:03), Max: 99 (19 Nov 2021 17:41)  HR: 133 (20 Nov 2021 06:03) (66 - 133)  BP: 98/71 (20 Nov 2021 06:03) (94/77 - 109/76)  BP(mean): 80 (20 Nov 2021 06:03) (80 - 80)  RR: 20 (20 Nov 2021 06:03) (18 - 20)  SpO2: 94% (20 Nov 2021 06:03) (92% - 100%)    PHYSICAL EXAM:  Constitutional: Comfortable . No acute distress.   HEENT: Atraumatic and normcephalic , neck is supple . no JVD.  PEERL   CNS: A&O x3. No focal neurologic deficits.   Respiratory: CTAB. No wheezing, rhonchi or crackles   Cardiovascular: Tachycardia.  S1S2  No murmur or rubs  Gastrointestinal: Soft non-tender and non distended . +Bowel sounds.   Extremities: No pitting  edema x 4 extremities  Psychiatric: Calm . no agitation.    A&P  Chest pain and palpitation sp methylPREDNISolone IVP  Continue telemonitor   EKG was ordered and still pending  Trend trops, ck, sed rate, serial EKGs  Continue monitoring  Pt hemodynamically stable Call by medicine PA pt co chest pain    At my arrival pt asleep conformably. States chest pain and palpitations stated after he received IV steroids this AM. Chest pain was midsternal, non-radiated, 3/10. Pt currently asymptomatic. Chest pain resolved                                    REVIEW OF SYMPTOMS: See HPI.  Cardiovascular:  No chest pain,  No dyspnea,  No fatigue, No syncope,  No palpitations, No dizziness, No Orthopnea, No Paroxsymal nocturnal dyspnea.  Respiratory:  No Dyspnea, No cough.  Genitourinary:  No dysuria, no hematuria.  Gastrointestinal:  No nausea, no vomiting. No diarrhea.  No abdominal pain.   Neurological: No headache, no dizziness, no slurred speech.  Psychiatric: No agitation, no anxiety.    ALL OTHER REVIEW OF SYSTEMS ARE NEGATIVE.    Vital Signs Last 24 Hrs  T(C): 36.6 (20 Nov 2021 06:03), Max: 37.2 (19 Nov 2021 17:41)  T(F): 97.9 (20 Nov 2021 06:03), Max: 99 (19 Nov 2021 17:41)  HR: 133 (20 Nov 2021 06:03) (66 - 133)  BP: 98/71 (20 Nov 2021 06:03) (94/77 - 109/76)  BP(mean): 80 (20 Nov 2021 06:03) (80 - 80)  RR: 20 (20 Nov 2021 06:03) (18 - 20)  SpO2: 94% (20 Nov 2021 06:03) (92% - 100%)    PHYSICAL EXAM:  Constitutional: Comfortable . No acute distress.   HEENT: Atraumatic and normcephalic , neck is supple . no JVD.  PEERL   CNS: A&O x3. No focal neurologic deficits.   Respiratory: CTAB. No wheezing, rhonchi or crackles   Cardiovascular: Tachycardia.  S1S2  No murmur or rubs  Gastrointestinal: Soft non-tender and non distended . +Bowel sounds.   Extremities: No pitting  edema x 4 extremities  Psychiatric: Calm . no agitation.    A&P  Chest pain and palpitation sp methylPREDNISolone IVP  Continue telemonitor   EKG was ordered and still pending  Trend trops, ck, sed rate, serial EKGs  Heart rate improved 115 BPM  Continue monitoring  Pt hemodynamically stable  Day JOSE CRUZ leach f/u results

## 2021-11-20 NOTE — PROGRESS NOTE ADULT - SUBJECTIVE AND OBJECTIVE BOX
Pt resting comfortably.   Tele with ongoing atrial flutter/atrial tachycardia- HRs 90s occasionally goes to 2:1 conduction and HRs 130s bpm.     MEDICATIONS  (STANDING):  ALBUTerol    90 MICROgram(s) HFA Inhaler 2 Puff(s) Inhalation every 6 hours  albuterol/ipratropium for Nebulization 3 milliLiter(s) Nebulizer every 6 hours  aspirin enteric coated 81 milliGRAM(s) Oral daily  atorvastatin 40 milliGRAM(s) Oral at bedtime  bisoprolol   Tablet 2.5 milliGRAM(s) Oral once  bisoprolol   Tablet 5 milliGRAM(s) Oral at bedtime  budesonide 160 MICROgram(s)/formoterol 4.5 MICROgram(s) Inhaler 2 Puff(s) Inhalation two times a day  enoxaparin Injectable 70 milliGRAM(s) SubCutaneous two times a day  furosemide   Injectable 40 milliGRAM(s) IV Push two times a day  influenza   Vaccine 0.5 milliLiter(s) IntraMuscular once  losartan 25 milliGRAM(s) Oral daily  magnesium sulfate  IVPB 2 Gram(s) IV Intermittent once  methylPREDNISolone sodium succinate Injectable 40 milliGRAM(s) IV Push every 12 hours  nicotine -  14 mG/24Hr(s) Patch 1 patch Transdermal daily  tiotropium 18 MICROgram(s) Capsule 1 Capsule(s) Inhalation daily    MEDICATIONS  (PRN):  acetaminophen     Tablet .. 650 milliGRAM(s) Oral every 6 hours PRN Temp greater or equal to 38C (100.4F), Mild Pain (1 - 3)  aluminum hydroxide/magnesium hydroxide/simethicone Suspension 30 milliLiter(s) Oral every 4 hours PRN Dyspepsia  melatonin 3 milliGRAM(s) Oral at bedtime PRN Insomnia  ondansetron Injectable 4 milliGRAM(s) IV Push every 8 hours PRN Nausea and/or Vomiting      Allergies    No Known Allergies    Intolerances    pork (Other)    PAST MEDICAL & SURGICAL HISTORY:  Heart failure, systolic    CAD (coronary artery disease)    Hypertension    Nonischemic cardiomyopathy    No significant past surgical history        Vital Signs Last 24 Hrs  T(C): 36.6 (20 Nov 2021 10:15), Max: 37.2 (19 Nov 2021 17:41)  T(F): 97.9 (20 Nov 2021 10:15), Max: 99 (19 Nov 2021 17:41)  HR: 93 (20 Nov 2021 10:15) (66 - 133)  BP: 113/76 (20 Nov 2021 10:15) (94/77 - 113/76)  BP(mean): 80 (20 Nov 2021 06:03) (80 - 80)  RR: 17 (20 Nov 2021 10:15) (17 - 20)  SpO2: 95% (20 Nov 2021 10:15) (92% - 100%)    Physical Exam:    Constitutional: NAD, AAOx3  Cardiovascular: regularly irregular  Pulmonary: CTA b/l, unlabored  GI: soft NTND +BS  Extremities: trace-1+ pitting edema to calfs    LABS:                        15.0   8.14  )-----------( 174      ( 20 Nov 2021 07:58 )             46.9     11-20    135  |  98  |  43.6<H>  ----------------------------<  170<H>  5.1   |  22.0  |  1.28    Ca    8.8      20 Nov 2021 07:58  Phos  3.8     11-19  Mg     1.9     11-19    TPro  6.8  /  Alb  3.9  /  TBili  0.9  /  DBili  x   /  AST  39  /  ALT  23  /  AlkPhos  202<H>  11-20    PT/INR - ( 18 Nov 2021 20:51 )   PT: 17.6 sec;   INR: 1.55 ratio         PTT - ( 18 Nov 2021 20:51 )  PTT:38.8 sec      RADIOLOGY & ADDITIONAL TESTS:  < from: TTE Echo Complete w/o Contrast w/ Doppler (11.20.21 @ 08:33) >   1. Left ventricular ejection fraction, by visual estimation, is <20%.   2. Technically good study.   3. Severely enlarged left atrium.   4. Severely enlarged right atrium.   5. Severely enlarged right ventricle.   6. Severely reduced RV systolic function.   7. Moderate to severe mitral valve regurgitation.   8. Thickening of the anterior mitral valve leaflet.   9. Moderate tricuspid regurgitation.  10. Estimated pulmonary artery systolic pressure is 37.8 mmHg assuming a right atrial pressure of 8 mmHg, which is consistent with borderline pulmonary hypertension.    < end of copied text >    < from: Cardiac Cath Lab - Adult (06.22.16 @ 12:51) >  CORONARY VESSELS: The coronary circulation is right dominant.  LM:   --  LM: Angiography showed minor luminal irregularities with no flow  limiting lesions.  LAD:   --  Mid LAD: There was a 50 % stenosis.  CX:   --  Circumflex: Angiography showed minor luminal irregularities with  no flow limiting lesions.  RCA:   --  Proximal RCA: There was a 60 % stenosis.  --  Mid RCA: There was a 85 % stenosis.  COMPLICATIONS: There were no complications.  DIAGNOSTIC RECOMMENDATIONS: The patient should continue with the present  medications.  Prepared and signed by  Tahir Wilks M.D.

## 2021-11-20 NOTE — PROGRESS NOTE ADULT - ATTENDING COMMENTS
Pt is seen, examined, chart reviewed, d/w np/pa.  Management as outlined above.  Seen by EP and plan for an ablation on Monday   Hold Lovenox Monday morning  NPO Sunday after midnight.  VANESSA on monday

## 2021-11-20 NOTE — PROGRESS NOTE ADULT - ASSESSMENT
62yo M w/ PMHx of CAD, Nonischemic Cardiomyopathy - CHF (EF in 2016 15-20%) s/p ICD (reports placed about 5 months ago by Dr. Shaikh), nonobstructive CAD, HTN admitted after his ICD fired multiple times, worsening dyspnea due to acute on chronic systolic congestive heart failure exacerbation likely due to dietary noncompliance.  TTE with EF < 20%.      EP Recommendations:    VANESSA and ablation Monday, keep NPO p MD. arrhythmia may be atypical flutter, in which case LA ablation including PVI may be needed.   -continue bisoprolol for HR control. increase dose to 5mg tonight. can increase 5mg bid if more HR needed in interim and BP can tolerate.   -continue lovenox through sunday night. do not dose Monday am. Will start DOAC medication following ablation.      Problem/Recommendation - 1:  ·  Problem: Implantable cardioverter-defibrillator (ICD) discharge.   ·  Recommendation:   keep K>4.0 and Mg>2.0  - Seen by EP and plan for an ablation on Monday   Hold Lovenox Monday morning  NPO Sunday after midnight.      Problem/Recommendation - 2:  ·  Problem: Acute on chronic systolic congestive heart failure.   ·  Recommendation: A  - Continue Lasix 40 IV 2 x daily,   monitor strict I's & O's and daily weights with goal to be net negative 1.5-2 liters in the next 24 hours - I and O's not accurate - d/w nurisng  - low Na diet, fluid restriction 1200ml daily  - continue Losartan for now, might benefit better from HF perspective if he is able to start Entresto  - Lipitor 40mg po q daily        Problem/Recommendation - 3:  ·  Problem: HTN (hypertension).   ·  Recommendation: Low Na diet, 105/77 - stable.    - continue Lasix, and Losartan.     Problem/Recommendation - 4:  ·  Problem: Atrial flutter with controlled response.   ·  Recommendation: Patient w/ newly diagnosed A flutter  - currently in flutter on monitor  - [id number]: 2  - continue weight based Lovenox 1mg/kg BID - hold Monday morning for procedure.    - Hold coreg and start bisprophol as recommended by EP.

## 2021-11-21 LAB
ANION GAP SERPL CALC-SCNC: 17 MMOL/L — SIGNIFICANT CHANGE UP (ref 5–17)
BUN SERPL-MCNC: 52 MG/DL — HIGH (ref 8–20)
CALCIUM SERPL-MCNC: 9.3 MG/DL — SIGNIFICANT CHANGE UP (ref 8.6–10.2)
CHLORIDE SERPL-SCNC: 96 MMOL/L — LOW (ref 98–107)
CO2 SERPL-SCNC: 21 MMOL/L — LOW (ref 22–29)
CREAT SERPL-MCNC: 1.6 MG/DL — HIGH (ref 0.5–1.3)
GLUCOSE BLDC GLUCOMTR-MCNC: 138 MG/DL — HIGH (ref 70–99)
GLUCOSE BLDC GLUCOMTR-MCNC: 153 MG/DL — HIGH (ref 70–99)
GLUCOSE SERPL-MCNC: 145 MG/DL — HIGH (ref 70–99)
MAGNESIUM SERPL-MCNC: 2.3 MG/DL — SIGNIFICANT CHANGE UP (ref 1.6–2.6)
PHOSPHATE SERPL-MCNC: 4.9 MG/DL — HIGH (ref 2.4–4.7)
POTASSIUM SERPL-MCNC: 5.4 MMOL/L — HIGH (ref 3.5–5.3)
POTASSIUM SERPL-SCNC: 5.4 MMOL/L — HIGH (ref 3.5–5.3)
SARS-COV-2 RNA SPEC QL NAA+PROBE: SIGNIFICANT CHANGE UP
SODIUM SERPL-SCNC: 134 MMOL/L — LOW (ref 135–145)
TROPONIN T SERPL-MCNC: 0.06 NG/ML — SIGNIFICANT CHANGE UP (ref 0–0.06)

## 2021-11-21 PROCEDURE — 99232 SBSQ HOSP IP/OBS MODERATE 35: CPT

## 2021-11-21 PROCEDURE — 71045 X-RAY EXAM CHEST 1 VIEW: CPT | Mod: 26

## 2021-11-21 PROCEDURE — 93010 ELECTROCARDIOGRAM REPORT: CPT

## 2021-11-21 PROCEDURE — 99233 SBSQ HOSP IP/OBS HIGH 50: CPT

## 2021-11-21 PROCEDURE — 76705 ECHO EXAM OF ABDOMEN: CPT | Mod: 26

## 2021-11-21 RX ORDER — INSULIN LISPRO 100/ML
VIAL (ML) SUBCUTANEOUS
Refills: 0 | Status: DISCONTINUED | OUTPATIENT
Start: 2021-11-21 | End: 2021-12-02

## 2021-11-21 RX ORDER — BISOPROLOL FUMARATE 10 MG/1
5 TABLET, FILM COATED ORAL DAILY
Refills: 0 | Status: DISCONTINUED | OUTPATIENT
Start: 2021-11-21 | End: 2021-12-02

## 2021-11-21 RX ORDER — SODIUM ZIRCONIUM CYCLOSILICATE 10 G/10G
5 POWDER, FOR SUSPENSION ORAL
Refills: 0 | Status: DISCONTINUED | OUTPATIENT
Start: 2021-11-21 | End: 2021-11-26

## 2021-11-21 RX ORDER — DEXTROSE 50 % IN WATER 50 %
25 SYRINGE (ML) INTRAVENOUS ONCE
Refills: 0 | Status: DISCONTINUED | OUTPATIENT
Start: 2021-11-21 | End: 2021-12-02

## 2021-11-21 RX ORDER — FUROSEMIDE 40 MG
20 TABLET ORAL DAILY
Refills: 0 | Status: DISCONTINUED | OUTPATIENT
Start: 2021-11-22 | End: 2021-11-22

## 2021-11-21 RX ORDER — DEXTROSE 50 % IN WATER 50 %
12.5 SYRINGE (ML) INTRAVENOUS ONCE
Refills: 0 | Status: DISCONTINUED | OUTPATIENT
Start: 2021-11-21 | End: 2021-12-02

## 2021-11-21 RX ORDER — SODIUM CHLORIDE 9 MG/ML
1000 INJECTION, SOLUTION INTRAVENOUS
Refills: 0 | Status: DISCONTINUED | OUTPATIENT
Start: 2021-11-21 | End: 2021-12-02

## 2021-11-21 RX ORDER — CALCIUM ACETATE 667 MG
667 TABLET ORAL
Refills: 0 | Status: DISCONTINUED | OUTPATIENT
Start: 2021-11-21 | End: 2021-11-28

## 2021-11-21 RX ORDER — OXYCODONE AND ACETAMINOPHEN 5; 325 MG/1; MG/1
1 TABLET ORAL ONCE
Refills: 0 | Status: DISCONTINUED | OUTPATIENT
Start: 2021-11-21 | End: 2021-11-21

## 2021-11-21 RX ORDER — GLUCAGON INJECTION, SOLUTION 0.5 MG/.1ML
1 INJECTION, SOLUTION SUBCUTANEOUS ONCE
Refills: 0 | Status: DISCONTINUED | OUTPATIENT
Start: 2021-11-21 | End: 2021-12-02

## 2021-11-21 RX ORDER — ENOXAPARIN SODIUM 100 MG/ML
70 INJECTION SUBCUTANEOUS ONCE
Refills: 0 | Status: COMPLETED | OUTPATIENT
Start: 2021-11-21 | End: 2021-11-21

## 2021-11-21 RX ORDER — DEXTROSE 50 % IN WATER 50 %
15 SYRINGE (ML) INTRAVENOUS ONCE
Refills: 0 | Status: DISCONTINUED | OUTPATIENT
Start: 2021-11-21 | End: 2021-12-02

## 2021-11-21 RX ORDER — LANOLIN ALCOHOL/MO/W.PET/CERES
3 CREAM (GRAM) TOPICAL AT BEDTIME
Refills: 0 | Status: DISCONTINUED | OUTPATIENT
Start: 2021-11-21 | End: 2021-12-02

## 2021-11-21 RX ADMIN — OXYCODONE AND ACETAMINOPHEN 1 TABLET(S): 5; 325 TABLET ORAL at 01:08

## 2021-11-21 RX ADMIN — Medication 81 MILLIGRAM(S): at 17:30

## 2021-11-21 RX ADMIN — Medication 3 MILLILITER(S): at 09:08

## 2021-11-21 RX ADMIN — Medication 3 MILLILITER(S): at 15:47

## 2021-11-21 RX ADMIN — Medication 5 MILLIGRAM(S): at 00:13

## 2021-11-21 RX ADMIN — Medication 650 MILLIGRAM(S): at 21:50

## 2021-11-21 RX ADMIN — OXYCODONE AND ACETAMINOPHEN 1 TABLET(S): 5; 325 TABLET ORAL at 00:20

## 2021-11-21 RX ADMIN — BISOPROLOL FUMARATE 5 MILLIGRAM(S): 10 TABLET, FILM COATED ORAL at 21:49

## 2021-11-21 RX ADMIN — LOSARTAN POTASSIUM 25 MILLIGRAM(S): 100 TABLET, FILM COATED ORAL at 05:24

## 2021-11-21 RX ADMIN — ENOXAPARIN SODIUM 70 MILLIGRAM(S): 100 INJECTION SUBCUTANEOUS at 05:33

## 2021-11-21 RX ADMIN — SODIUM ZIRCONIUM CYCLOSILICATE 5 GRAM(S): 10 POWDER, FOR SUSPENSION ORAL at 16:15

## 2021-11-21 RX ADMIN — Medication 2: at 17:31

## 2021-11-21 RX ADMIN — Medication 3 MILLILITER(S): at 20:35

## 2021-11-21 RX ADMIN — Medication 3 MILLIGRAM(S): at 00:19

## 2021-11-21 NOTE — PROGRESS NOTE ADULT - SUBJECTIVE AND OBJECTIVE BOX
[facility]                                                        Office: [address] [address]                                                       Telephone: [phone]. Fax:[phone]                                                                             PROGRESS NOTE   Reason for follow up:   Aflutter                            Overnight: No new events.   Update:   stefania states his abdomen is slight more distended, states his last paracentesis  was two years ago, would recommend Abdominal ultrasound.  Also new diabetes, A1C is 6.3, recommend adding sliding scale, carb controlled diet, and  nutritional diabetic assessment.  Patients with elevated bun/creatinine 52/1.60, recommend lowering Lasix dosage.    D/W with MD Bush  Subjective: "I am very upset, my bathroom is a mess"   Complains of: Nothing  Review of symptoms: Cardiac:  No chest pain. No dyspnea. No palpitations.  Respiratory: no cough. No dyspnea  Gastrointestinal: No diarrhea. No abdominal pain. No bleeding.     Past medical history: No updates.   Chronic conditions: PAST MEDICAL & SURGICAL HISTORY:  Heart failure, systolic  CAD (coronary artery disease)  Hypertension  Nonischemic cardiomyopathy  No significant past surgical history  	  Vitals:  T(C): 36.8 (11-20-21 @ 20:45), Max: 36.8 (11-20-21 @ 20:45)  HR: 85 (11-21-21 @ 09:08) (85 - 138)  BP: 96/78 (11-21-21 @ 09:02) (96/78 - 116/58)  RR: 18 (11-21-21 @ 02:54) (18 - 20)  SpO2: 96% (11-21-21 @ 09:08) (95% - 99%)    I&O's Summary  20 Nov 2021 07:01  -  21 Nov 2021 07:00  --------------------------------------------------------  IN: 0 mL / OUT: 100 mL / NET: -100 mL  21 Nov 2021 07:01  -  21 Nov 2021 14:25  --------------------------------------------------------  IN: 0 mL / OUT: 300 mL / NET: -300 mL    Weight (kg): 72.6 (11-19 @ 22:21)    PHYSICAL EXAM:  Appearance: Comfortable. No acute distress, thin frail [place]  HEENT:  Head and neck: Atraumatic. Normocephalic.  Normal oral mucosa, PERRL, Neck is supple.    Neurologic: A & O x 3, no focal deficits  Cardiovascular: Irregular, tachycardia,  S1 S2, No murmur, rubs/gallops. No JVD, No edema  Respiratory: Lungs clear to auscultation, diminished bilaterally bases.    Gastrointestinal:  Slightly hard, distended, Non-tender, + BS  Lower Extremities: No edema  Psychiatry: Patient is calm. No agitation. Mood & affect appropriate  Skin: No rash, warm and dry.      CURRENT MEDICATIONS:  bisoprolol   Tablet 5 milliGRAM(s) Oral daily  ALBUTerol    90 MICROgram(s) HFA Inhaler  albuterol/ipratropium for Nebulization  budesonide 160 MICROgram(s)/formoterol 4.5 MICROgram(s) Inhaler  tiotropium 18 MICROgram(s) Capsule  melatonin  atorvastatin  aspirin enteric coated  enoxaparin Injectable  influenza   Vaccine  magnesium sulfate  IVPB    LABS:	 	  CARDIAC MARKERS ( 21 Nov 2021 07:20 )  x     / 0.06 ng/mL / x     / x     / x      p-BNP 21 Nov 2021 07:20: x    , CARDIAC MARKERS ( 20 Nov 2021 14:02 )  x     / 0.04 ng/mL / x     / x     / x      p-BNP 20 Nov 2021 14:02: x    , CARDIAC MARKERS ( 20 Nov 2021 07:58 )  x     / 0.04 ng/mL / 458 U/L / x     / 4.4 ng/mL  p-BNP 20 Nov 2021 07:58: x    , CARDIAC MARKERS ( 18 Nov 2021 20:51 )  x     / 0.03 ng/mL / x     / x     / x      p-BNP 18 Nov 2021 20:51: 79249 pg/mL                       15.0   8.14  )-----------( 174      ( 20 Nov 2021 07:58 )             46.9   11-21  134<L>  |  96<L>  |  52.0<H>  ----------------------------<  145<H>  5.4<H>   |  21.0<L>  |  1.60<H>  Ca    9.3      21 Nov 2021 07:20  Phos  4.9     11-21  Mg     2.3     11-21  TPro  6.8  /  Alb  3.9  /  TBili  0.9  /  DBili  x   /  AST  39  /  ALT  23  /  AlkPhos  202<H>  11-20  proBNP: Serum Pro-Brain Natriuretic Peptide: 14593 pg/mL (11-18 @ 20:51)    HgA1c: TSH: Thyroid Stimulating Hormone, Serum: 1.67 uIU/mL    TELEMETRY: Reviewed

## 2021-11-21 NOTE — PROGRESS NOTE ADULT - ASSESSMENT
61M with pmh of  HTN, CAD (50%mLAD, 60%pRCA and 85% mRCA on cath 2016 - no intervention), NICM, HFrEF (15%) s/p Biotronik ICD (VDD; [place]el; 6/2021),presented to [facility] ED w/ new atrial tachycardia (atrial flutter- possibly atypical) and several inappropriate ICD shocks for AFL w/ periods of rapid conduction.   HRs now better controlled on bisoprolol.   Planning for ablation of atrial arrhythmia on Monday. Maintenance of sinus rhythm needed to improve CHF status, improve symptoms, and reduce likelihood of further ICD shocks.   -VANESSA and ablation Monday, keep NPO p MD. Arrhythmia may be atypical flutter, in which case LA ablation including PVI may be needed.   -continue bisoprolol for HR control.   -continue lovenox through sunday night. do not dose monday am. Will start DOAC medication following ablation.

## 2021-11-21 NOTE — PROGRESS NOTE ADULT - ASSESSMENT
60yo M w/ PMHx of CAD, Nonischemic Cardiomyopathy - CHF (EF in 2016 15-20%) s/p ICD (reports placed about 5 months ago by Dr. Shaikh),and  HTN admitted after his ICD fired multiple times, worsening dyspnea , pt is seen by cardiology and EPS found to have A flutter       1-ICD discharge   -defibrillator discharged ~14 times at home per patient  EP consult appreciated, interrogation adjustment to AICD done   monitor electrolytes  keep K>4.0 and Mg>2.0  -cont with tele monitor   for ablation on monday     2-Atrial flutter with high rate - NSR   -s/p diltiazem and lopressor IVP   cont  Lovenox 1mg/Kg BID   -on Coreg  6.25mg oral BID   EP follow up re rate control, antiarrythmic use     3- CAD with CP this am associated   -troponin neg , serial cardiac enzymes ordered        4-Acute on chronic systolic CHF   -decompensated, orthopnea, +cough  cont  Lasix 40 IV BID, cont lisinopril 25mg daily and coreg   -strict I/Os, daily weights   -low Na diet, fluid restriction   -Cont NC 2L for now, titrated off as tolerated   -TTE from 10/28 noted  -Cardiology following     5- Respiratory failure acute hypoxic   likely  multifactorial CHF , A flutter , underlying lung disease ? active smoker   cont neb therapy s/p short course iv steroid   counseling provided to quite smoking     6-HTN, soft controlled   -cont with Lasix, coreg and Lisinopril with strict holding parameter    7-Nicotine use disorder   -nicotine patch ordered   -smoking cessation provided     DVT ppx   full dose lovenox for Atrial flutter         60yo M w/ PMHx of CAD, Nonischemic Cardiomyopathy - CHF (EF in 2016 15-20%) s/p ICD (reports placed about 5 months ago by Dr. Shaikh),and  HTN admitted after his ICD fired multiple times, worsening dyspnea ,found to have rapid atrial f;lutter  pt is seen by cardiology and EPS , treated with iv lasix , oxygen ., medication adjusted for rate conctrol,m pt is with unco      1-ICD discharge   -defibrillator discharged ~14 times at home per patient. interrogated seen by EP   stable   -cont with tele monitor      2-Atrial flutter with high rate , new onset   for [place] in am per EP   NPO after MN ,. will DC lovenox after pm dose   on bisoprolol 5 daily per EP     3- CAD with CP, resolved pain   serial troponin neg   cont aspirin       4-Acute on chronic systolic CHF   on iv lasix , cr is up , will change to daily 40 mg  cont  Lasix 40 IV BID, cont lisinopril 25mg daily and coreg   -strict I/Os, daily weights   -low Na diet, fluid restriction   -TTE from 10/28 noted  -Cardiology following     5- Respiratory failure acute hypoxic   likely  multifactorial CHF , A flutter , underlying lung disease ?COPD ,  active smoker   cont neb therapy s/p short course iv steroid , discontinue   counseling provided to quite smoking     6- ARF with hyperkalemia   decrease dose of lasix   bladder scan cont to monitor closely     7-HTN, soft  cont to monitor on bisoprolol     8-Nicotine use disorder   -nicotine patch ordered   -smoking cessation provided     DVT ppx   full dose lovenox for Atrial flutter           62yo M w/ PMHx of CAD, Nonischemic Cardiomyopathy - CHF (EF in 2016 15-20%) s/p ICD (reports placed about 5 months ago by Dr. Shaikh),and  HTN admitted after his ICD fired multiple times, worsening dyspnea ,found to have rapid atrial f;lutter  pt is seen by cardiology and EPS , treated with iv lasix , oxygen ., medication adjusted for rate conctrol,m pt is with unco      1-ICD discharge   -defibrillator discharged ~14 times at home per patient. interrogated seen by EP   stable   -cont with tele monitor      2-Atrial flutter with high rate , new onset   for [place] in am per EP   NPO after MN ,. will DC lovenox after pm dose   on bisoprolol 5 daily per EP     3- CAD with CP, resolved pain   serial troponin neg   cont aspirin       4-Acute on chronic systolic CHF   on iv lasix , cr is up , will change to daily 40 mg  cont  Lasix 40 IV BID, cont lisinopril 25mg daily and coreg   -strict I/Os, daily weights   -low Na diet, fluid restriction   -TTE from 10/28 noted  -Cardiology following     5- Respiratory failure acute hypoxic   likely  multifactorial CHF , A flutter , underlying lung disease ?COPD ,  active smoker   cont neb therapy s/p short course iv steroid , discontinue   counseling provided to quite smoking     6- ARF with hyperkalemia   decrease dose of lasix   bladder scan cont to monitor closely     7-HTN, soft  cont to monitor on bisoprolol    8- - New dx of Diabetes Mellitus , hba1c 6.3   fasting  in labs  change diet add accucheck and sliding scale   monitor   diabetic education     9-Nicotine use disorder   -nicotine patch ordered   -smoking cessation provided     DVT ppx   full dose lovenox for Atrial flutter

## 2021-11-21 NOTE — PROGRESS NOTE ADULT - SUBJECTIVE AND OBJECTIVE BOX
resting comfortably.     remains in atrial tachycardia, rates improved today mostly 90s-100s bpm.     MEDICATIONS  (STANDING):  ALBUTerol    90 MICROgram(s) HFA Inhaler 2 Puff(s) Inhalation every 6 hours  albuterol/ipratropium for Nebulization 3 milliLiter(s) Nebulizer every 6 hours  aspirin enteric coated 81 milliGRAM(s) Oral daily  atorvastatin 40 milliGRAM(s) Oral at bedtime  bisoprolol   Tablet 5 milliGRAM(s) Oral daily  budesonide 160 MICROgram(s)/formoterol 4.5 MICROgram(s) Inhaler 2 Puff(s) Inhalation two times a day  enoxaparin Injectable 70 milliGRAM(s) SubCutaneous two times a day  influenza   Vaccine 0.5 milliLiter(s) IntraMuscular once  magnesium sulfate  IVPB 2 Gram(s) IV Intermittent once  melatonin 3 milliGRAM(s) Oral at bedtime  nicotine -  14 mG/24Hr(s) Patch 1 patch Transdermal daily  sodium zirconium cyclosilicate 5 Gram(s) Oral two times a day  tiotropium 18 MICROgram(s) Capsule 1 Capsule(s) Inhalation daily    MEDICATIONS  (PRN):  acetaminophen     Tablet .. 650 milliGRAM(s) Oral every 6 hours PRN Temp greater or equal to 38C (100.4F), Mild Pain (1 - 3)  aluminum hydroxide/magnesium hydroxide/simethicone Suspension 30 milliLiter(s) Oral every 4 hours PRN Dyspepsia  melatonin 3 milliGRAM(s) Oral at bedtime PRN Insomnia  ondansetron Injectable 4 milliGRAM(s) IV Push every 8 hours PRN Nausea and/or Vomiting      Allergies    No Known Allergies    Intolerances    pork (Other)    PAST MEDICAL & SURGICAL HISTORY:  Heart failure, systolic    CAD (coronary artery disease)    Hypertension    Nonischemic cardiomyopathy    No significant past surgical history        Vital Signs Last 24 Hrs  T(C): 36.8 (20 Nov 2021 20:45), Max: 36.8 (20 Nov 2021 20:45)  T(F): 98.2 (20 Nov 2021 20:45), Max: 98.2 (20 Nov 2021 20:45)  HR: 98 (21 Nov 2021 02:54) (93 - 138)  BP: 96/78 (21 Nov 2021 09:02) (96/78 - 116/58)  BP(mean): --  RR: 18 (21 Nov 2021 02:54) (18 - 20)  SpO2: 96% (21 Nov 2021 02:54) (95% - 99%)    Physical Exam:  Constitutional: NAD, AAOx3  Cardiovascular: regularly irregular  Pulmonary: CTA b/l, unlabored  GI: soft NTND +BS  Extremities: trace-1+ pitting edema to calfs    LABS:                        15.0   8.14  )-----------( 174      ( 20 Nov 2021 07:58 )             46.9     11-21    134<L>  |  96<L>  |  52.0<H>  ----------------------------<  145<H>  5.4<H>   |  21.0<L>  |  1.60<H>    Ca    9.3      21 Nov 2021 07:20  Phos  4.9     11-21  Mg     2.3     11-21    TPro  6.8  /  Alb  3.9  /  TBili  0.9  /  DBili  x   /  AST  39  /  ALT  23  /  AlkPhos  202<H>  11-20          RADIOLOGY & ADDITIONAL TESTS:  < from: TTE Echo Complete w/o Contrast w/ Doppler (11.20.21 @ 08:33) >   1. Left ventricular ejection fraction, by visual estimation, is <20%.   2. Technically good study.   3. Severely enlarged left atrium.   4. Severely enlarged right atrium.   5. Severely enlarged right ventricle.   6. Severely reduced RV systolic function.   7. Moderate to severe mitral valve regurgitation.   8. Thickening of the anterior mitral valve leaflet.   9. Moderate tricuspid regurgitation.  10. Estimated pulmonary artery systolic pressure is 37.8 mmHg assuming a right atrial pressure of 8 mmHg, which is consistent with borderline pulmonary hypertension.    < end of copied text >    < from: Cardiac Cath Lab - Adult (06.22.16 @ 12:51) >  CORONARY VESSELS: The coronary circulation is right dominant.  LM:   --  LM: Angiography showed minor luminal irregularities with no flow  limiting lesions.  LAD:   --  Mid LAD: There was a 50 % stenosis.  CX:   --  Circumflex: Angiography showed minor luminal irregularities with  no flow limiting lesions.  RCA:   --  Proximal RCA: There was a 60 % stenosis.  --  Mid RCA: There was a 85 % stenosis.  COMPLICATIONS: There were no complications.  DIAGNOSTIC RECOMMENDATIONS: The patient should continue with the present  medications.  Prepared and signed by

## 2021-11-21 NOTE — PROGRESS NOTE ADULT - ATTENDING COMMENTS
Pt is seen, examined, chart reviewed, d/w np/pa.  Management as outlined above.  Atrial flutter  VANESSA/ablation Monday morning  NPO Jeison night

## 2021-11-21 NOTE — PROGRESS NOTE ADULT - SUBJECTIVE AND OBJECTIVE BOX
Patient is a 61y old  Male admitted for Aflutter, ICD firing   Pt is seen at the bedside   earlier today , c/o not urinating much despite diuretics   no CP , cardiac monitor reviewed 's     Pt denies SOB         PHYSICAL EXAM:  General: awake alert lying in the bed   ENT: moist , oxygen via NC   Neck: supple ,no JVD   Lungs: CTA bilateral   Cardio: RRR, S1/S2, No murmur  Abdomen: Soft, Nontender, Nondistended; Bowel sounds present  Extremities: No calf tenderness, No pitting edema        LABS:                        15.0   8.14  )-----------( 174      ( 20 Nov 2021 07:58 )             46.9     11-20    135  |  98  |  43.6  ----------------------------<  170  5.1   |  22.0  |  1.28    Ca    8.8      20 Nov 2021 07:58  Phos  3.8     11-19  Mg     1.9     11-19    TPro  6.8  /  Alb  3.9  /  TBili  0.9  /  DBili  x   /  AST  39  /  ALT  23  /  AlkPhos  202  11-20        eGFR if Non African American: 61 mL/min/1.73M2 (11-20-21 @ 07:58)  eGFR if : 71 mL/min/1.73M2 (11-20-21 @ 07:58)    PT/INR - ( 18 Nov 2021 20:51 )   PT: 17.6 sec;   INR: 1.55 ratio         PTT - ( 18 Nov 2021 20:51 )  PTT:38.8 sec      CARDIAC MARKERS ( 20 Nov 2021 07:58 )  x     / 0.04 ng/mL / 458 U/L / x     / 4.4 ng/mL  CARDIAC MARKERS ( 18 Nov 2021 20:51 )  x     / 0.03 ng/mL / x     / x     / x          10-28 Chol 104 mg/dL LDL -- HDL 23 mg/dL Trig 55 mg/dL                      COVID-19 PCR: NotDetec (11-18-21 @ 20:48)  COVID-19 PCR: NotDetec (11-01-21 @ 06:21)    RADIOLOGY & ADDITIONAL TESTS:       Patient is a 61y old  Male admitted for Aflutter, ICD firing   Pt is seen at the bedside   earlier today , c/o not urinating much despite diuretics   no CP , cardiac monitor reviewed 's     Pt denies SOB at present     EP and cardiology input appreciated     Vital Signs Last 24 Hrs  T(C): 36.8 (20 Nov 2021 20:45), Max: 36.8 (20 Nov 2021 20:45)  T(F): 98.2 (20 Nov 2021 20:45), Max: 98.2 (20 Nov 2021 20:45)  HR: 98 (21 Nov 2021 02:54) (93 - 138)  BP: 96/78 (21 Nov 2021 09:02) (96/78 - 116/58)  BP(mean): --  RR: 18 (21 Nov 2021 02:54) (18 - 20)  SpO2: 96% (21 Nov 2021 02:54) (95% - 99%)    PHYSICAL EXAM:  General: awake alert lying in the bed   ENT: moist , oxygen via NC   Neck: supple ,no JVD   Lungs: CTA bilateral anteriorly diminished BS   Cardio: RRR ,tachycardic   S1/S2, No murmur  Abdomen: Soft, Nontender, Nondistended; Bowel sounds present  Extremities: No calf tenderness, No pitting edema  Skin : warm , normal color                           15.0   8.14  )-----------( 174      ( 20 Nov 2021 07:58 )             46.9   11-21    134<L>  |  96<L>  |  52.0<H>  ----------------------------<  145<H>  5.4<H>   |  21.0<L>  |  1.60<H>    Ca    9.3      21 Nov 2021 07:20  Phos  4.9     11-21  Mg     2.3     11-21    TPro  6.8  /  Alb  3.9  /  TBili  0.9  /  DBili  x   /  AST  39  /  ALT  23  /  AlkPhos  202<H>  11-20      CAPILLARY BLOOD GLUCOSE       Patient is a 61y old  Male admitted for Aflutter, ICD firing   Pt is seen at the bedside   earlier today , c/o not urinating much despite diuretics   no CP , cardiac monitor reviewed 's     Pt denies SOB at present     EP and cardiology input appreciated     Vital Signs Last 24 Hrs  T(C): 36.8 (20 Nov 2021 20:45), Max: 36.8 (20 Nov 2021 20:45)  T(F): 98.2 (20 Nov 2021 20:45), Max: 98.2 (20 Nov 2021 20:45)  HR: 98 (21 Nov 2021 02:54) (93 - 138)  BP: 96/78 (21 Nov 2021 09:02) (96/78 - 116/58)  BP(mean): --  RR: 18 (21 Nov 2021 02:54) (18 - 20)  SpO2: 96% (21 Nov 2021 02:54) (95% - 99%)    PHYSICAL EXAM:  General: awake alert lying in the bed   ENT: moist , oxygen via NC   Neck: supple ,no JVD   Lungs: CTA bilateral anteriorly diminished BS   Cardio: RRR ,tachycardic   S1/S2, No murmur  Abdomen: Soft, Nontender, Nondistended; Bowel sounds present  Extremities: No calf tenderness, No pitting edema  Skin : warm , normal color                           15.0   8.14  )-----------( 174      ( 20 Nov 2021 07:58 )             46.9   11-21    134<L>  |  96<L>  |  52.0<H>  ----------------------------<  145<H>  5.4<H>   |  21.0<L>  |  1.60<H>    Ca    9.3      21 Nov 2021 07:20  Phos  4.9     11-21  Mg     2.3     11-21    TPro  6.8  /  Alb  3.9  /  TBili  0.9  /  DBili  x   /  AST  39  /  ALT  23  /  AlkPhos  202<H>  11-20      hmoA1C with Estimated Average Glucose Result: 6.3 % (11.20.21 @ 15:24)

## 2021-11-21 NOTE — PROGRESS NOTE ADULT - ASSESSMENT
60yo M w/ PMHx of CAD, Nonischemic Cardiomyopathy - CHF (EF in 2016 15-20%) s/p ICD (reports placed about 5 months ago by Dr. Shaikh), nonobstructive CAD, HTN admitted after his ICD fired multiple times, worsening dyspnea due to acute on chronic systolic congestive heart failure exacerbation likely due to dietary noncompliance.  TTE with EF < 20%.      Patient states his abdomen is slight more distended, states his last paracentesis  was two years ago, would recommend Abdominal ultrasound.  Also new diabetes, A1C is 6.3, recommend adding sliding scale, carb controlled diet, and  nutritional diabetic assessment.  Patients with elevated bun/creatinine 52/1.60, recommend lowering Lasix dosage.    D/W with MD Zachariah Bush     Problem/Recommendation - 1:  ·  Problem: Implantable cardioverter-defibrillator (ICD) discharge.   ·  Recommendation:   keep K>4.0 and Mg>2.0  - EP following     Problem/Recommendation - 2:  ·  Problem: Acute on chronic systolic congestive heart failure.   ·  Recommendation:   - elevated bun/creatinine 52/1.60, recommend lowering Lasix dosage.     Repeat bmp in am   monitor strict I's & O's and daily weights   - low Na diet, fluid restriction 1200ml daily  - Lipitor 40mg po q daily   -VANESSA Monday morning  NPO Sunday night  -High phosphorous/hyperkalemia/hyponatremia - decrease Lasix/repeat labs in am/if no improvement renal consult     Problem/Recommendation - 3:  ·  Problem: HTN (hypertension).  96//78  ·  Recommendation: Low Na diet, -   - Decrease Lasix, and Losartan.     Problem/Recommendation - 4:  ·  Problem: Atrial flutter  - continue bisoprolol for HR control - aflutter rates upto 130 with movement/activity  - [id number]: 2  - continue weight based Lovenox 1mg/kg BID - hold Monday morning for procedure.      Problem/recommendations - 5  Abd distention - patient states his last paracentesis was 2 years ago.   Recommend ultrasound abdomen, may need paracentesis      Problem / recommendations - 6  New diabetes  insulin sliding scale  carb controlled diet  diabetic education with nutritional consult    Please for attending for finale recomendations

## 2021-11-22 LAB
ALBUMIN SERPL ELPH-MCNC: 3.5 G/DL — SIGNIFICANT CHANGE UP (ref 3.3–5.2)
ALP SERPL-CCNC: 144 U/L — HIGH (ref 40–120)
ALT FLD-CCNC: 19 U/L — SIGNIFICANT CHANGE UP
AMPHET UR-MCNC: NEGATIVE — SIGNIFICANT CHANGE UP
ANION GAP SERPL CALC-SCNC: 18 MMOL/L — HIGH (ref 5–17)
ANION GAP SERPL CALC-SCNC: 18 MMOL/L — HIGH (ref 5–17)
APPEARANCE UR: CLEAR — SIGNIFICANT CHANGE UP
AST SERPL-CCNC: 22 U/L — SIGNIFICANT CHANGE UP
BACTERIA # UR AUTO: ABNORMAL
BARBITURATES UR SCN-MCNC: NEGATIVE — SIGNIFICANT CHANGE UP
BASE EXCESS BLDV CALC-SCNC: -8.3 MMOL/L — LOW (ref -2–3)
BENZODIAZ UR-MCNC: NEGATIVE — SIGNIFICANT CHANGE UP
BILIRUB SERPL-MCNC: 1.2 MG/DL — SIGNIFICANT CHANGE UP (ref 0.4–2)
BILIRUB UR-MCNC: NEGATIVE — SIGNIFICANT CHANGE UP
BUN SERPL-MCNC: 67.5 MG/DL — HIGH (ref 8–20)
BUN SERPL-MCNC: 67.6 MG/DL — HIGH (ref 8–20)
CA-I SERPL-SCNC: 0.92 MMOL/L — LOW (ref 1.15–1.33)
CALCIUM SERPL-MCNC: 7.5 MG/DL — LOW (ref 8.6–10.2)
CALCIUM SERPL-MCNC: 8.9 MG/DL — SIGNIFICANT CHANGE UP (ref 8.6–10.2)
CHLORIDE BLDV-SCNC: 108 MMOL/L — HIGH (ref 98–107)
CHLORIDE SERPL-SCNC: 102 MMOL/L — SIGNIFICANT CHANGE UP (ref 98–107)
CHLORIDE SERPL-SCNC: 97 MMOL/L — LOW (ref 98–107)
CO2 SERPL-SCNC: 18 MMOL/L — LOW (ref 22–29)
CO2 SERPL-SCNC: 19 MMOL/L — LOW (ref 22–29)
COCAINE METAB.OTHER UR-MCNC: POSITIVE
COLOR SPEC: YELLOW — SIGNIFICANT CHANGE UP
CREAT SERPL-MCNC: 1.67 MG/DL — HIGH (ref 0.5–1.3)
CREAT SERPL-MCNC: 1.83 MG/DL — HIGH (ref 0.5–1.3)
DIFF PNL FLD: NEGATIVE — SIGNIFICANT CHANGE UP
EPI CELLS # UR: SIGNIFICANT CHANGE UP
GAS PNL BLDV: 132 MMOL/L — LOW (ref 136–145)
GAS PNL BLDV: SIGNIFICANT CHANGE UP
GAS PNL BLDV: SIGNIFICANT CHANGE UP
GLUCOSE BLDC GLUCOMTR-MCNC: 130 MG/DL — HIGH (ref 70–99)
GLUCOSE BLDC GLUCOMTR-MCNC: 141 MG/DL — HIGH (ref 70–99)
GLUCOSE BLDC GLUCOMTR-MCNC: 79 MG/DL — SIGNIFICANT CHANGE UP (ref 70–99)
GLUCOSE BLDC GLUCOMTR-MCNC: 88 MG/DL — SIGNIFICANT CHANGE UP (ref 70–99)
GLUCOSE BLDC GLUCOMTR-MCNC: 98 MG/DL — SIGNIFICANT CHANGE UP (ref 70–99)
GLUCOSE BLDV-MCNC: 65 MG/DL — LOW (ref 70–99)
GLUCOSE SERPL-MCNC: 104 MG/DL — HIGH (ref 70–99)
GLUCOSE SERPL-MCNC: 71 MG/DL — SIGNIFICANT CHANGE UP (ref 70–99)
GLUCOSE UR QL: NEGATIVE MG/DL — SIGNIFICANT CHANGE UP
HCO3 BLDV-SCNC: 19 MMOL/L — LOW (ref 22–29)
HCT VFR BLD CALC: 49.1 % — SIGNIFICANT CHANGE UP (ref 39–50)
HCT VFR BLDA CALC: 41 % — SIGNIFICANT CHANGE UP (ref 39–51)
HGB BLD CALC-MCNC: 13.7 G/DL — SIGNIFICANT CHANGE UP (ref 12.6–17.4)
HGB BLD-MCNC: 16 G/DL — SIGNIFICANT CHANGE UP (ref 13–17)
HYALINE CASTS # UR AUTO: ABNORMAL /LPF
KETONES UR-MCNC: ABNORMAL
LACTATE BLDV-MCNC: 1.5 MMOL/L — SIGNIFICANT CHANGE UP (ref 0.5–2)
LEUKOCYTE ESTERASE UR-ACNC: ABNORMAL
MCHC RBC-ENTMCNC: 30.1 PG — SIGNIFICANT CHANGE UP (ref 27–34)
MCHC RBC-ENTMCNC: 32.6 GM/DL — SIGNIFICANT CHANGE UP (ref 32–36)
MCV RBC AUTO: 92.3 FL — SIGNIFICANT CHANGE UP (ref 80–100)
METHADONE UR-MCNC: NEGATIVE — SIGNIFICANT CHANGE UP
NITRITE UR-MCNC: NEGATIVE — SIGNIFICANT CHANGE UP
OPIATES UR-MCNC: NEGATIVE — SIGNIFICANT CHANGE UP
OSMOLALITY UR: 417 MOSM/KG — SIGNIFICANT CHANGE UP (ref 300–1000)
PCO2 BLDV: 42 MMHG — SIGNIFICANT CHANGE UP (ref 42–55)
PCP SPEC-MCNC: SIGNIFICANT CHANGE UP
PCP UR-MCNC: NEGATIVE — SIGNIFICANT CHANGE UP
PH BLDV: 7.26 — LOW (ref 7.32–7.43)
PH UR: 5 — SIGNIFICANT CHANGE UP (ref 5–8)
PLATELET # BLD AUTO: 178 K/UL — SIGNIFICANT CHANGE UP (ref 150–400)
PO2 BLDV: 44 MMHG — SIGNIFICANT CHANGE UP (ref 25–45)
POTASSIUM BLDV-SCNC: 3.6 MMOL/L — SIGNIFICANT CHANGE UP (ref 3.5–5.1)
POTASSIUM SERPL-MCNC: 4.3 MMOL/L — SIGNIFICANT CHANGE UP (ref 3.5–5.3)
POTASSIUM SERPL-MCNC: 5.9 MMOL/L — HIGH (ref 3.5–5.3)
POTASSIUM SERPL-SCNC: 4.3 MMOL/L — SIGNIFICANT CHANGE UP (ref 3.5–5.3)
POTASSIUM SERPL-SCNC: 5.9 MMOL/L — HIGH (ref 3.5–5.3)
POTASSIUM UR-SCNC: 54 MMOL/L — SIGNIFICANT CHANGE UP
PROT SERPL-MCNC: 5.6 G/DL — LOW (ref 6.6–8.7)
PROT UR-MCNC: 30 MG/DL
RBC # BLD: 5.32 M/UL — SIGNIFICANT CHANGE UP (ref 4.2–5.8)
RBC # FLD: 15.4 % — HIGH (ref 10.3–14.5)
RBC CASTS # UR COMP ASSIST: SIGNIFICANT CHANGE UP /HPF (ref 0–4)
SAO2 % BLDV: 68.7 % — SIGNIFICANT CHANGE UP
SODIUM SERPL-SCNC: 134 MMOL/L — LOW (ref 135–145)
SODIUM SERPL-SCNC: 138 MMOL/L — SIGNIFICANT CHANGE UP (ref 135–145)
SODIUM UR-SCNC: <30 MMOL/L — SIGNIFICANT CHANGE UP
SP GR SPEC: 1.02 — SIGNIFICANT CHANGE UP (ref 1.01–1.02)
THC UR QL: POSITIVE
UROBILINOGEN FLD QL: NEGATIVE MG/DL — SIGNIFICANT CHANGE UP
WBC # BLD: 12.42 K/UL — HIGH (ref 3.8–10.5)
WBC # FLD AUTO: 12.42 K/UL — HIGH (ref 3.8–10.5)
WBC UR QL: SIGNIFICANT CHANGE UP

## 2021-11-22 PROCEDURE — 71250 CT THORAX DX C-: CPT | Mod: 26

## 2021-11-22 PROCEDURE — 74176 CT ABD & PELVIS W/O CONTRAST: CPT | Mod: 26

## 2021-11-22 PROCEDURE — 93010 ELECTROCARDIOGRAM REPORT: CPT

## 2021-11-22 PROCEDURE — 93320 DOPPLER ECHO COMPLETE: CPT | Mod: 26

## 2021-11-22 PROCEDURE — 93312 ECHO TRANSESOPHAGEAL: CPT | Mod: 26,59

## 2021-11-22 PROCEDURE — 92960 CARDIOVERSION ELECTRIC EXT: CPT | Mod: 59

## 2021-11-22 PROCEDURE — 99233 SBSQ HOSP IP/OBS HIGH 50: CPT

## 2021-11-22 PROCEDURE — 99233 SBSQ HOSP IP/OBS HIGH 50: CPT | Mod: 25

## 2021-11-22 PROCEDURE — 93325 DOPPLER ECHO COLOR FLOW MAPG: CPT | Mod: 26

## 2021-11-22 RX ORDER — FUROSEMIDE 40 MG
80 TABLET ORAL ONCE
Refills: 0 | Status: DISCONTINUED | OUTPATIENT
Start: 2021-11-22 | End: 2021-11-22

## 2021-11-22 RX ORDER — ENOXAPARIN SODIUM 100 MG/ML
70 INJECTION SUBCUTANEOUS ONCE
Refills: 0 | Status: DISCONTINUED | OUTPATIENT
Start: 2021-11-22 | End: 2021-11-22

## 2021-11-22 RX ORDER — HEPARIN SODIUM 5000 [USP'U]/ML
6000 INJECTION INTRAVENOUS; SUBCUTANEOUS ONCE
Refills: 0 | Status: COMPLETED | OUTPATIENT
Start: 2021-11-22 | End: 2021-11-22

## 2021-11-22 RX ORDER — DEXTROSE 50 % IN WATER 50 %
50 SYRINGE (ML) INTRAVENOUS ONCE
Refills: 0 | Status: COMPLETED | OUTPATIENT
Start: 2021-11-22 | End: 2021-11-22

## 2021-11-22 RX ORDER — PIPERACILLIN AND TAZOBACTAM 4; .5 G/20ML; G/20ML
3.38 INJECTION, POWDER, LYOPHILIZED, FOR SOLUTION INTRAVENOUS ONCE
Refills: 0 | Status: COMPLETED | OUTPATIENT
Start: 2021-11-22 | End: 2021-11-22

## 2021-11-22 RX ORDER — LIDOCAINE 4 G/100G
1 CREAM TOPICAL ONCE
Refills: 0 | Status: COMPLETED | OUTPATIENT
Start: 2021-11-22 | End: 2021-11-22

## 2021-11-22 RX ORDER — AMIODARONE HYDROCHLORIDE 400 MG/1
200 TABLET ORAL DAILY
Refills: 0 | Status: DISCONTINUED | OUTPATIENT
Start: 2021-11-28 | End: 2021-12-02

## 2021-11-22 RX ORDER — FUROSEMIDE 40 MG
40 TABLET ORAL ONCE
Refills: 0 | Status: COMPLETED | OUTPATIENT
Start: 2021-11-22 | End: 2021-11-22

## 2021-11-22 RX ORDER — FUROSEMIDE 40 MG
60 TABLET ORAL ONCE
Refills: 0 | Status: COMPLETED | OUTPATIENT
Start: 2021-11-22 | End: 2021-11-22

## 2021-11-22 RX ORDER — HEPARIN SODIUM 5000 [USP'U]/ML
3000 INJECTION INTRAVENOUS; SUBCUTANEOUS EVERY 6 HOURS
Refills: 0 | Status: DISCONTINUED | OUTPATIENT
Start: 2021-11-22 | End: 2021-12-01

## 2021-11-22 RX ORDER — FUROSEMIDE 40 MG
40 TABLET ORAL ONCE
Refills: 0 | Status: DISCONTINUED | OUTPATIENT
Start: 2021-11-22 | End: 2021-11-22

## 2021-11-22 RX ORDER — HEPARIN SODIUM 5000 [USP'U]/ML
6000 INJECTION INTRAVENOUS; SUBCUTANEOUS EVERY 6 HOURS
Refills: 0 | Status: DISCONTINUED | OUTPATIENT
Start: 2021-11-22 | End: 2021-12-01

## 2021-11-22 RX ORDER — INSULIN HUMAN 100 [IU]/ML
10 INJECTION, SOLUTION SUBCUTANEOUS ONCE
Refills: 0 | Status: COMPLETED | OUTPATIENT
Start: 2021-11-22 | End: 2021-11-22

## 2021-11-22 RX ORDER — PIPERACILLIN AND TAZOBACTAM 4; .5 G/20ML; G/20ML
3.38 INJECTION, POWDER, LYOPHILIZED, FOR SOLUTION INTRAVENOUS EVERY 8 HOURS
Refills: 0 | Status: DISCONTINUED | OUTPATIENT
Start: 2021-11-22 | End: 2021-11-22

## 2021-11-22 RX ORDER — HEPARIN SODIUM 5000 [USP'U]/ML
INJECTION INTRAVENOUS; SUBCUTANEOUS
Qty: 25000 | Refills: 0 | Status: DISCONTINUED | OUTPATIENT
Start: 2021-11-22 | End: 2021-11-26

## 2021-11-22 RX ORDER — AMIODARONE HYDROCHLORIDE 400 MG/1
400 TABLET ORAL THREE TIMES A DAY
Refills: 0 | Status: COMPLETED | OUTPATIENT
Start: 2021-11-22 | End: 2021-11-27

## 2021-11-22 RX ORDER — OXYCODONE AND ACETAMINOPHEN 5; 325 MG/1; MG/1
1 TABLET ORAL ONCE
Refills: 0 | Status: DISCONTINUED | OUTPATIENT
Start: 2021-11-22 | End: 2021-11-22

## 2021-11-22 RX ADMIN — LIDOCAINE 1 PATCH: 4 CREAM TOPICAL at 03:13

## 2021-11-22 RX ADMIN — ATORVASTATIN CALCIUM 40 MILLIGRAM(S): 80 TABLET, FILM COATED ORAL at 22:02

## 2021-11-22 RX ADMIN — LIDOCAINE 1 PATCH: 4 CREAM TOPICAL at 16:15

## 2021-11-22 RX ADMIN — PIPERACILLIN AND TAZOBACTAM 200 GRAM(S): 4; .5 INJECTION, POWDER, LYOPHILIZED, FOR SOLUTION INTRAVENOUS at 11:04

## 2021-11-22 RX ADMIN — Medication 1 PATCH: at 22:05

## 2021-11-22 RX ADMIN — Medication 3 MILLILITER(S): at 16:22

## 2021-11-22 RX ADMIN — Medication 3 MILLILITER(S): at 03:27

## 2021-11-22 RX ADMIN — AMIODARONE HYDROCHLORIDE 400 MILLIGRAM(S): 400 TABLET ORAL at 22:01

## 2021-11-22 RX ADMIN — Medication 40 MILLIGRAM(S): at 11:03

## 2021-11-22 RX ADMIN — OXYCODONE AND ACETAMINOPHEN 1 TABLET(S): 5; 325 TABLET ORAL at 12:30

## 2021-11-22 RX ADMIN — SODIUM ZIRCONIUM CYCLOSILICATE 5 GRAM(S): 10 POWDER, FOR SUSPENSION ORAL at 05:16

## 2021-11-22 RX ADMIN — INSULIN HUMAN 10 UNIT(S): 100 INJECTION, SOLUTION SUBCUTANEOUS at 13:08

## 2021-11-22 RX ADMIN — OXYCODONE AND ACETAMINOPHEN 1 TABLET(S): 5; 325 TABLET ORAL at 11:03

## 2021-11-22 RX ADMIN — Medication 50 MILLILITER(S): at 12:56

## 2021-11-22 RX ADMIN — Medication 3 MILLIGRAM(S): at 22:01

## 2021-11-22 RX ADMIN — Medication 60 MILLIGRAM(S): at 19:37

## 2021-11-22 RX ADMIN — Medication 3 MILLILITER(S): at 21:36

## 2021-11-22 RX ADMIN — Medication 40 MILLIGRAM(S): at 15:49

## 2021-11-22 RX ADMIN — HEPARIN SODIUM 6000 UNIT(S): 5000 INJECTION INTRAVENOUS; SUBCUTANEOUS at 17:09

## 2021-11-22 RX ADMIN — SODIUM ZIRCONIUM CYCLOSILICATE 5 GRAM(S): 10 POWDER, FOR SUSPENSION ORAL at 22:02

## 2021-11-22 RX ADMIN — HEPARIN SODIUM 1300 UNIT(S)/HR: 5000 INJECTION INTRAVENOUS; SUBCUTANEOUS at 17:24

## 2021-11-22 RX ADMIN — Medication 1 PATCH: at 11:04

## 2021-11-22 NOTE — PROGRESS NOTE ADULT - SUBJECTIVE AND OBJECTIVE BOX
[facility]                                                               Office: [address] [address]                                                              Telephone: [phone]. Fax:[phone]                                                                             PROGRESS NOTE  Reason for follow up: Atrial flutter, Decompensated HFrEF   Update: Patient feels like he is retaining a lot more fluid today then he was yesterday with worsening dyspnea. Patient states that the vein in his neck, with very apparent JVD, is causing him pain as well. Patient also with worsening ascites, awaiting to be evaluated for paracentesis. EP following as well, plan to defer ablation today due to respiratory status.   Covid Status: Negative 11/21/21    Review of symptoms:   Cardiac:  No chest pain. +dyspnea. No palpitations.  Respiratory: No cough. + dyspnea  Gastrointestinal: No diarrhea. No abdominal pain. No bleeding.     Past medical history: No updates.   	  Vitals:  T(C): 36.8 (11-22-21 @ 09:46), Max: 36.8 (11-22-21 @ 09:46)  HR: 55 (11-22-21 @ 09:46) (54 - 100)  BP: 94/61 (11-22-21 @ 09:46) (89/51 - 99/70)  RR: 20 (11-22-21 @ 09:46) (16 - 22)  SpO2: 96% (11-22-21 @ 09:46) (94% - 98%)  Wt(kg): --  I&O's Summary    21 Nov 2021 07:01  -  22 Nov 2021 07:00  --------------------------------------------------------  IN: 0 mL / OUT: 300 mL / NET: -300 mL    22 Nov 2021 07:01  -  22 Nov 2021 15:15  --------------------------------------------------------  IN: 0 mL / OUT: 200 mL / NET: -200 mL      Weight (kg): 72.6 (11-19 @ 22:21)      PHYSICAL EXAM:  Appearance: Comfortable. No acute distress  HEENT:  Head and neck: Atraumatic. Normocephalic.  Normal oral mucosa, PERRL, Neck is supple. + JVD, No carotid bruit.   Neurologic: A&Ox 3, no focal deficits. EOMI, Cranial nerves are intact.  Lymphatic: No cervical lymphadenopathy  Cardiovascular: Tachycardic S1 S2, No rubs/gallops. + JVD,   Respiratory: Coarse rhonchi with rales at the bases  Gastrointestinal:  Soft, Non-tender, + BS +ascites   Lower Extremities: 1+ b/l LE edema  Psychiatry: Patient is calm. No agitation. Mood & affect appropriate  Skin: No rashes/ecchymoses/cyanosis/ulcers visualized on the face, hands or feet.      CURRENT MEDICATIONS:  bisoprolol   Tablet 5 milliGRAM(s) Oral daily  furosemide   Injectable 40 milliGRAM(s) IV Push once    ALBUTerol    90 MICROgram(s) HFA Inhaler  albuterol/ipratropium for Nebulization  budesonide 160 MICROgram(s)/formoterol 4.5 MICROgram(s) Inhaler  tiotropium 18 MICROgram(s) Capsule  melatonin  atorvastatin  dextrose 40% Gel  dextrose 50% Injectable  dextrose 50% Injectable  dextrose 50% Injectable  glucagon  Injectable  insulin lispro (ADMELOG) corrective regimen sliding scale  predniSONE   Tablet  predniSONE   Tablet  aspirin enteric coated  calcium acetate  dextrose 5%.  dextrose 5%.  enoxaparin Injectable  influenza   Vaccine  magnesium sulfate  IVPB      DIAGNOSTIC TESTING:  [ ] Echocardiogram:   < from: TTE Echo Complete w/o Contrast w/ Doppler (11.20.21 @ 08:33) >  PHYSICIAN INTERPRETATION:  Left Ventricle: The left ventricular internal cavity size is severely increased.  Left ventricular ejection fraction, by visual estimation, is <20%.  Right Ventricle: The right ventricular size is severely enlarged. RV systolic function is severely reduced. TV S' 0.1 m/s.  Left Atrium: Severely enlarged left atrium.  Right Atrium: Severely enlarged right atrium.  Pericardium: There is no evidence of pericardial effusion.  Mitral Valve: Thickening of the anterior mitral valve leaflet. Moderate to severe mitral valve regurgitation is seen.  Tricuspid Valve: Moderate tricuspid regurgitation is visualized. Estimated pulmonary artery systolic pressureis 37.8 mmHg assuming a right atrial pressure of 8 mmHg, which is consistent with borderline pulmonary hypertension.  Aortic Valve: Peak transaortic gradient equals 1.0 mmHg, mean transaortic gradient equals 0.8 mmHg, the calculated aortic valve areaequals 2.85 cm² by the continuity equation consistent with normally opening aortic valve. No evidence of aortic valve regurgitation is seen.  Pulmonic Valve: The pulmonic valve was not well visualized. No indication of pulmonic valve regurgitation.  Aorta: The aortic root is normal in size and structure.  Pulmonary Artery: The pulmonary artery is of normal size and origin.  Venous: The inferior vena cava was dilated, with respiratory size variation less than 50%.      Summary:   1. Left ventricular ejection fraction, by visual estimation, is <20%.   2. Technically good study.   3. Severely enlarged left atrium.   4. Severely enlarged right atrium.   5. Severely enlarged right ventricle.   6. Severely reduced RV systolic function.   7. Moderate to severe mitral valve regurgitation.   8. Thickening of the anterior mitral valve leaflet.   9. Moderate tricuspid regurgitation.  10. Estimated pulmonary artery systolic pressure is 37.8 mmHg assuming a right atrial pressure of 8 mmHg, which is consistent with borderline pulmonary hypertension.    MD Greyson Electronically signed on 11/20/2021 at 12:51:39 PM      < end of copied text >    [ ]  Catheterization:  < from: Cardiac Cath Lab - Adult (06.22.16 @ 12:51) >  VENTRICLES: EF estimated was 15 %.  CORONARY VESSELS: The coronary circulation is right dominant.  LM:   --  LM: Angiography showed minor luminal irregularities with no flow  limiting lesions.  LAD:   --  Mid LAD: There was a 50 % stenosis.  CX:   --  Circumflex: Angiography showed minor luminal irregularities with  no flow limiting lesions.  RCA:   --  Proximal RCA: There was a 60 % stenosis.  --  Mid RCA: There was a 85 % stenosis.  COMPLICATIONS: There were no complications.  DIAGNOSTIC RECOMMENDATIONS: The patient should continue with the present  medications.  Prepared and signed by  Tahir Wilks M.D.  Signed 06/22/2016 15:44:49    < end of copied text >    [ ] Stress Test:    OTHER: 	      LABS:	 	  CARDIAC MARKERS ( 21 Nov 2021 07:20 )  x     / 0.06 ng/mL / x     / x     / x      p-BNP 21 Nov 2021 07:20: x    , CARDIAC MARKERS ( 20 Nov 2021 14:02 )  x     / 0.04 ng/mL / x     / x     / x      p-BNP 20 Nov 2021 14:02: x    , CARDIAC MARKERS ( 20 Nov 2021 07:58 )  x     / 0.04 ng/mL / 458 U/L / x     / 4.4 ng/mL  p-BNP 20 Nov 2021 07:58: x                              16.0   12.42 )-----------( 178      ( 22 Nov 2021 07:19 )             49.1     11-22    134<L>  |  97<L>  |  67.5<H>  ----------------------------<  104<H>  5.9<H>   |  19.0<L>  |  1.83<H>    Ca    8.9      22 Nov 2021 07:19  Phos  4.9     11-21  Mg     2.3     11-21      proBNP: Serum Pro-Brain Natriuretic Peptide: 00320 pg/mL (11-18 @ 20:51)    Lipid Profile:   HgA1c:   TSH: Thyroid Stimulating Hormone, Serum: 1.67 uIU/mL        TELEMETRY: Reviewed  atrial flutter, 90-130s

## 2021-11-22 NOTE — CONSULT NOTE ADULT - SUBJECTIVE AND OBJECTIVE BOX
Patient is a 61y old  Male who presents with a chief complaint of Aflutter, AICD firing (22 Nov 2021 11:56)    61M with pmh of CAD NICM, HFrEF (15%) s/p AICD, HTN presented to ED c/o palpitations and defibrillator firing. Pt reports to ICD firing 15 times. Pt states he just arrived to [facility] and was outside talking to one of the guys that lives in the same house as him when he suddenly felt sharp electrical shock in his chest, this happed 14 times within 40 minutes with associated palpitations and lightheadedness. Pt stated he had AICD placed 6month ago by Dr. Shaikh at [facility] and never experiencing defibrillator firing until today. Pt also reports to productive cough, worsening exertional dyspnea. He was admitted to  for chf discharged on 11/3 to [facility], states his dietary indiscretion has been very poor as he lives from [facility] to [facility]. He admits to medications compliance. Denies cp, fever, chills, abdominal pain. as per EMS pt was in  AFib w/ RVR and administered Diltiazem IV with rate control into the 90s and pt was given lopressor 5mg IVP in the ED.  Currently w/ A Flutter on Tele monitor and ECG also new onset A Flutter as there is no reports of prior hx.  (19 Nov 2021 00:35)  Now creat K high for which called    PAST MEDICAL & SURGICAL HISTORY:  Heart failure, systolic    CAD (coronary artery disease)    Hypertension    Nonischemic cardiomyopathy    No significant past surgical history except  AICD    FAMILY HISTORY:  FH: lung cancer         REVIEW OF SYSTEMS:  CONSTITUTIONAL: No fever, weight loss, or fatigue  EYES: No eye pain, No visual disturbances,   RESPIRATORY: No cough, hemoptysis; - SOB  CARDIOVASCULAR: No chest pain, No palpitations,   -leg swelling  GASTROINTESTINAL: No abdominal pain but feels distended , NVD or GIB  GENITOURINARY: No UTI sx/hematuria  NEUROLOGICAL: No HA/wk/numbness  MUSCULOSKELETAL: No joint pain, No swelling      Vital Signs Last 24 Hrs  T(C): 36.8 (22 Nov 2021 09:46), Max: 36.8 (22 Nov 2021 09:46)  T(F): 98.2 (22 Nov 2021 09:46), Max: 98.2 (22 Nov 2021 09:46)  HR: 55 (22 Nov 2021 09:46) (54 - 100)  BP: 94/61 (22 Nov 2021 09:46) (89/51 - 99/70)  BP(mean): --  RR: 20 (22 Nov 2021 09:46) (16 - 22)  SpO2: 96% (22 Nov 2021 09:46) (94% - 98%)    PHYSICAL EXAM:    GENERAL: NAD,   EYES:  conjunctiva and sclera clear  NECK: Supple, No JVD/Carotid Bruit -ve   NERVOUS SYSTEM:  A/O x3,   Lungs : No rales, No rhonchi,   HEART:  No murmur,  No rub, No gallops  ABDOMEN: Soft, NT/distended, ascitis likely BS+  EXTREMITIES:  + Peripheral Pulses, - edema  SKIN: No rashes or lesions      LABS:                        16.0   12.42 )-----------( 178      ( 22 Nov 2021 07:19 )             49.1     11-22    134<L>  |  97<L>  |  67.5<H>  ----------------------------<  104<H>  5.9<H>   |  19.0<L>  |  1.83<H>    Ca    8.9      22 Nov 2021 07:19  Phos  4.9     11-21  Mg     2.3     11-21              RADIOLOGY & ADDITIONAL TESTS:    MEDICATIONS  (STANDING):  acetaminophen     Tablet .. PRN  ALBUTerol    90 MICROgram(s) HFA Inhaler  albuterol/ipratropium for Nebulization  aluminum hydroxide/magnesium hydroxide/simethicone Suspension PRN  aspirin enteric coated  atorvastatin  bisoprolol   Tablet  budesonide 160 MICROgram(s)/formoterol 4.5 MICROgram(s) Inhaler  calcium acetate  dextrose 40% Gel  dextrose 5%.  dextrose 5%.  dextrose 50% Injectable  dextrose 50% Injectable  dextrose 50% Injectable  furosemide   Injectable  glucagon  Injectable  influenza   Vaccine  insulin lispro (ADMELOG) corrective regimen sliding scale  magnesium sulfate  IVPB  melatonin  melatonin PRN  nicotine -  14 mG/24Hr(s) Patch  ondansetron Injectable PRN  piperacillin/tazobactam IVPB..  predniSONE   Tablet  predniSONE   Tablet  sodium zirconium cyclosilicate  tiotropium 18 MICROgram(s) Capsule

## 2021-11-22 NOTE — CONSULT NOTE ADULT - ATTENDING COMMENTS
61M w/ PMHx HTN, multi vessel CAD, ICM, biventricular heart failure (~EF 15%) s/p AICD admitted w/ multiple ICD discharges on Friday in setting of AT/ AFL w/ rapid conduction. Hospital stay c/b progressive heart failure in setting of tachycardia and DERRICK. Currently s/p DCCV today w/ conversion back to NSR. ICU consulted post procedure ?   On eval pt is asymptomatic and had already been transferred back to 5T, HDS w/ HR < high 90s, NSR. Not in any distress. VANESSA w/ no FADUMO thrombus w/ low EF. Cocaine/ THC + on Utox. CT w/ cirrhosis and mod ascites   Will need to d/w Cards as pt is on Bisoprolol. Continue PO amiodarone load and hep gtt   No ICU needs at this time. Called and left msg w/ EP service (awaiting call back)

## 2021-11-22 NOTE — PROGRESS NOTE ADULT - SUBJECTIVE AND OBJECTIVE BOX
Pt doing well s/p uncomplicated VANESSA & DCCV. Remains very sleepy; otherwise denies complaint.     Exam:   VSS, NAD, lethargic  Skin: no erythema/edema/blistering at defib pad sites.   Card: S1/S2, RRR  Resp: diminished to right base, b/l crackles R>L  Abd: moderately distended, nontender  Ext: 1+ pitting edema to b/l mid calves, distal pulses intact  Neuro: non-focal, JOHNSON w/ strength intact    EKG: sinus rhythm w/ intact conduction; left axis, mild IVCD (QRS 100ms)      Assessment:   61M with pmh of  HTN, CAD (50%mLAD, 60%pRCA and 85% mRCA on cath 2016 - no intervention), NICM, HFrEF (15%) s/p Biotronik ICD (VDD; Neel; 6/2021),presented to [facility] ED w/ new atrial tachycardia (atrial flutter- possibly atypical) and several inappropriate ICD shocks for AFL w/ periods of rapid conduction. He was subsequently noted to have progressive heart failure, likely exacerbated by difficult to control RVR , now status post VANESSA negative for FADUMO thrombus and uncomplicated DCCV with restoration of sinus rhythm.     Plan:   Observation on telemetry per post op protocol.    Start amiodarone 400mg TID x 5 days, then reduce to 200mg daily. TFTs & LFTs should be monitored q 3-6 months while on amiodarone therapy. Anticipate <1 year of amiodarone therapy for this patient; however if > 1 year, patient will need annual fundoscopic exam and PFTs.  Resume PO intake, ambulate w/ assist once fully awake & back to baseline mental status w/ VSS.  Lasix to resume, increase to 60mg IV BID.   EP will follow.  Pt doing well s/p uncomplicated VANESSA & DCCV. Remains very sleepy; otherwise denies complaint.     Exam:   VSS, NAD, lethargic  Skin: no erythema/edema/blistering at defib pad sites.   ENT: Oropharynx clear; no erythema/edema.   Card: S1/S2, RRR  Resp: diminished to right base, b/l crackles R>L  Abd: moderately distended, nontender  Ext: 1+ pitting edema to b/l mid calves, distal pulses intact  Neuro: non-focal, JOHNSON w/ strength intact    EKG: sinus rhythm w/ intact conduction; left axis, mild IVCD (QRS 100ms)      Assessment:   61M with pmh of  HTN, CAD (50%mLAD, 60%pRCA and 85% mRCA on cath 2016 - no intervention), NICM, HFrEF (15%) s/p Biotronik ICD (VDD; Neel; 6/2021),presented to [facility] ED w/ new atrial tachycardia (atrial flutter- possibly atypical) and several inappropriate ICD shocks for AFL w/ periods of rapid conduction. He was subsequently noted to have progressive heart failure, likely exacerbated by difficult to control RVR , now status post VANESSA negative for FADUMO thrombus and uncomplicated DCCV with restoration of sinus rhythm.     Plan:   Observation on telemetry per post op protocol.    Given worsening renal function, pt transitioned to heparin for now.   Continue bisoprolol as tolerated.   Start amiodarone 400mg TID x 5 days, then reduce to 200mg daily. TFTs & LFTs should be monitored q 3-6 months while on amiodarone therapy. Anticipate <1 year of amiodarone therapy for this patient; however if > 1 year, patient will need annual fundoscopic exam and PFTs.  Resume PO intake, ambulate w/ assist once fully awake & back to baseline mental status w/ VSS.  Lasix to resume, increase to 60mg IV BID.   EP will follow.

## 2021-11-22 NOTE — CHART NOTE - NSCHARTNOTEFT_GEN_A_CORE
I called renal consult on this patient as per Dr maradiaga. Patient has acute renal failure with BUN 67.5 and Cr 1.83. I spoke with Dr Rain, Barry @ [phone]. Dr Rain will arrive to evalute patient on 5-T asap. I called renal consult on this patient as per Dr maradiaga. Patient has new onset Diabetes Mellitus and acute renal failure with BUN 67.5 and Cr 1.83. I spoke with Dr Rain, Barry @ [phone]. Dr Rain will arrive to evalute patient on 5-T asap.

## 2021-11-22 NOTE — PROGRESS NOTE ADULT - SUBJECTIVE AND OBJECTIVE BOX
Today's history:   - seen and examined, remains in AFL with sub-optimal rate control despite max tolerated BB (bisoprolol). Now with DERRICK and hyper-kalemia, probably cardio-renal in the setting of severe biventricular dysfunction, right pleural effusion, new/worsening ascites. He is also having worse SOB at rest and feels full in his abdomin. His chest and abdo/pelvic CT scans showed right side pleural effusion and ascitics but no obstruction or acute findings that require surgical intervention.   - Did not get this morning Lovenox in preparation for AFL ablation. He is not in a good shape for ablation today due to worsening HF, probably early cardiogenic shock, DERRICK, high potassium, and symptomatic ascitis/pleural effusion.  - Got Zosyn empirically yesterday for ? pneumonia and also started on high dose steroid yesterday.   - Seen by renal, and started on treatment for hyperkalemia, losartan held.  - Still NPO  - PA - LATERAL CXR showed RV lead in the RVOT       MEDICATIONS  (STANDING):  ALBUTerol    90 MICROgram(s) HFA Inhaler 2 Puff(s) Inhalation every 6 hours  albuterol/ipratropium for Nebulization 3 milliLiter(s) Nebulizer every 6 hours  aspirin enteric coated 81 milliGRAM(s) Oral daily  atorvastatin 40 milliGRAM(s) Oral at bedtime  bisoprolol   Tablet 5 milliGRAM(s) Oral daily  budesonide 160 MICROgram(s)/formoterol 4.5 MICROgram(s) Inhaler 2 Puff(s) Inhalation two times a day  calcium acetate 667 milliGRAM(s) Oral three times a day with meals  dextrose 40% Gel 15 Gram(s) Oral once  dextrose 5%. 1000 milliLiter(s) (50 mL/Hr) IV Continuous <Continuous>  dextrose 5%. 1000 milliLiter(s) (100 mL/Hr) IV Continuous <Continuous>  dextrose 50% Injectable 25 Gram(s) IV Push once  dextrose 50% Injectable 12.5 Gram(s) IV Push once  dextrose 50% Injectable 25 Gram(s) IV Push once  enoxaparin Injectable 70 milliGRAM(s) SubCutaneous once  glucagon  Injectable 1 milliGRAM(s) IntraMuscular once  influenza   Vaccine 0.5 milliLiter(s) IntraMuscular once  insulin lispro (ADMELOG) corrective regimen sliding scale   SubCutaneous three times a day before meals  magnesium sulfate  IVPB 2 Gram(s) IV Intermittent once  melatonin 3 milliGRAM(s) Oral at bedtime  nicotine -  14 mG/24Hr(s) Patch 1 patch Transdermal daily  predniSONE   Tablet 40 milliGRAM(s) Oral daily  predniSONE   Tablet   Oral   sodium zirconium cyclosilicate 5 Gram(s) Oral two times a day  tiotropium 18 MICROgram(s) Capsule 1 Capsule(s) Inhalation daily    MEDICATIONS  (PRN):  acetaminophen     Tablet .. 650 milliGRAM(s) Oral every 6 hours PRN Temp greater or equal to 38C (100.4F), Mild Pain (1 - 3)  aluminum hydroxide/magnesium hydroxide/simethicone Suspension 30 milliLiter(s) Oral every 4 hours PRN Dyspepsia  melatonin 3 milliGRAM(s) Oral at bedtime PRN Insomnia  ondansetron Injectable 4 milliGRAM(s) IV Push every 8 hours PRN Nausea and/or Vomiting        Allergies    No Known Allergies    Intolerances    pork (Other)    PAST MEDICAL & SURGICAL HISTORY:  Heart failure, systolic    CAD (coronary artery disease)    Hypertension    Nonischemic cardiomyopathy    No significant past surgical history        Vital Signs Last 24 Hrs  T(C): 36.8 (22 Nov 2021 09:46), Max: 36.8 (22 Nov 2021 09:46)  T(F): 98.2 (22 Nov 2021 09:46), Max: 98.2 (22 Nov 2021 09:46)  HR: 55 (22 Nov 2021 09:46) (54 - 100)  BP: 94/61 (22 Nov 2021 09:46) (89/51 - 99/70)  BP(mean): --  RR: 20 (22 Nov 2021 09:46) (16 - 22)  SpO2: 96% (22 Nov 2021 09:46) (94% - 98%)    Physical Exam:  Constitutional: NAD, AAOx3  Cardiovascular: regularly irregular  Pulmonary: decrease BS to right base, mild resp distress  GI: distended, + ascites   Extremities: trace-1+ pitting edema to calfs    LABS:                        16.0   12.42 )-----------( 178      ( 22 Nov 2021 07:19 )             49.1   11-22    134<L>  |  97<L>  |  67.5<H>  ----------------------------<  104<H>  5.9<H>   |  19.0<L>  |  1.83<H>    Ca    8.9      22 Nov 2021 07:19  Phos  4.9     11-21  Mg     2.3     11-21              RADIOLOGY & ADDITIONAL TESTS:  < from: TTE Echo Complete w/o Contrast w/ Doppler (11.20.21 @ 08:33) >   1. Left ventricular ejection fraction, by visual estimation, is <20%.   2. Technically good study.   3. Severely enlarged left atrium.   4. Severely enlarged right atrium.   5. Severely enlarged right ventricle.   6. Severely reduced RV systolic function.   7. Moderate to severe mitral valve regurgitation.   8. Thickening of the anterior mitral valve leaflet.   9. Moderate tricuspid regurgitation.  10. Estimated pulmonary artery systolic pressure is 37.8 mmHg assuming a right atrial pressure of 8 mmHg, which is consistent with borderline pulmonary hypertension.    < end of copied text >    < from: CT Abdomen and Pelvis No Cont (11.22.21 @ 11:51) >  CHEST:  LUNGS AND LARGE AIRWAYS: Patent central airways. Moderate centrilobular emphysematous changes, most prominent in the right upper lobe. Stable 4 mm pleural tag in the left lower lobe on image 89. No new discrete pulmonary nodule or confluent airspace opacity is identified.  PLEURA: Stable mild to moderate layering right pleural effusion with moderate rounded subpleural atelectasis in the right middle lobe and right lower lobe.  VESSELS: Within normal limits.  HEART: Left-sided dual-lead cardiac pacer. The heart is enlarged. No pericardial effusion.  MEDIASTINUM AND AIDA: No lymphadenopathy.  CHEST WALL AND LOWER NECK: Severe bilateral gynecomastia.    ABDOMEN AND PELVIS:  LIVER: The liver is bulbous in contour with prominence of the left lobe laterally and caudate lobe, suggestive of a cirrhotic morphology.  BILE DUCTS: Normal caliber.  GALLBLADDER: Small layering gallstones in the gallbladder.  SPLEEN: Within normal limits.  PANCREAS: Moderate atrophy and fatty replacement of the pancreas.  ADRENALS: Within normal limits.  KIDNEYS/URETERS: Within normal limits.    BLADDER: Minimally distended.  REPRODUCTIVE ORGANS: The prostate is not enlarged.    BOWEL: Scattered sigmoid diverticula. No bowel obstruction.  PERITONEUM: Moderate free fluid in the peritoneal cavity, suggestive of ascites.  VESSELS: Within normal limits.  RETROPERITONEUM/LYMPH NODES: No lymphadenopathy.  ABDOMINAL WALL: Right inguinal hernia containing peritoneal fluid.  BONES: Within normallimits.    IMPRESSION:  1. Stable mild to moderate right pleural effusion with moderate rounded atelectasis in the right middle lobe and right lower lobe.  2. Stable cardiomegaly.  3. Cirrhotic liver morphology with moderate ascites.    < end of copied text >    < from: Cardiac Cath Lab - Adult (06.22.16 @ 12:51) >  CORONARY VESSELS: The coronary circulation is right dominant.  LM:   --  LM: Angiography showed minor luminal irregularities with no flow  limiting lesions.  LAD:   --  Mid LAD: There was a 50 % stenosis.  CX:   --  Circumflex: Angiography showed minor luminal irregularities with  no flow limiting lesions.  RCA:   --  Proximal RCA: There was a 60 % stenosis.  --  Mid RCA: There was a 85 % stenosis.  COMPLICATIONS: There were no complications.  DIAGNOSTIC RECOMMENDATIONS: The patient should continue with the present  medications.  Prepared and signed by

## 2021-11-22 NOTE — CHART NOTE - NSCHARTNOTEFT_GEN_A_CORE
Medicine PA-  Cd for pt c/o R. shoulder/ neck pain, same pain he's had intermittently, denies trauma/ injury. Pt's hx aflutter, CHF w/ EF 20%, pending a VANESSA today, has ICD. Pt's been hypotension, last received bisoprolol  82/52> 97/64.    On monitor- +aflutter 80's  Vital Signs Last 24 Hrs  T(C): 36.4 (22 Nov 2021 04:17), Max: 36.6 (21 Nov 2021 19:30)  T(F): 97.5 (22 Nov 2021 04:17), Max: 97.9 (21 Nov 2021 19:30)  HR: 54 (22 Nov 2021 04:17) (54 - 96)  BP: 89/51 (22 Nov 2021 04:17) (89/51 - 99/70)  BP(mean): --  RR: 16 (22 Nov 2021 04:17) (16 - 18)  SpO2: 98% (22 Nov 2021 04:17) (94% - 98%)     Exam:  R shoulder/ neck- + tenderness along trapezius/ deltoid, no swelling or redness, FROM    >Muscle spasm R trapezius / shoulder  -tylenol  -lidoderm patch  -continue to monitor VS, call PA if no improvement

## 2021-11-22 NOTE — PROGRESS NOTE ADULT - ASSESSMENT
60yo M w/ PMHx of CAD, Nonischemic Cardiomyopathy - CHF (EF in 2016 15-20%) s/p ICD (reports placed about 5 months ago by Dr. Shaikh),and  HTN admitted after his ICD fired multiple times, worsening dyspnea ,found to have rapid atrial f;lutter  pt is seen by cardiology and EPS , treated with iv lasix , oxygen ., medication adjusted for rate conctrol,m pt is with unco    1- Acute on cronic sytolic herat failure   on iv diuretics   ace ing held due to worsening cr   cont diuretics monitor cr closely   pt is with ascities pn US   will d/w  IR for possible paracentesis     2- Right chest pain / neck pain  CT of chest stat ordered   r/o pneumonia   start empirical iv zosyn   cont neb oxygen     3-Acute hypoxic resp failure   likely  multifactorial CHF , A flutter , underlying lung disease ?COPD ,  active smoker   cont neb therapy s/p iv steroid   cont po taper and neb therapy   cont oxygen supplement   incentive spirometry     4- DERRICK with hyperkalemia   on diuretics   avoid nephrotoxic meds , lisinopril stopped     treat hyperkalemia  , insulin dextrose treatment ordered   worsening K   add lokelma daily   repeat K later and in am   nephrology consulted     5--ICD discharge   -defibrillator discharged ~14 times at home per patient. interrogated seen by EP on admission   cont to follow   stable   -cont with tele monitor      6--Atrial flutter with high rate , new onset   fablation today on hold due to CP , abd distention  NPO after MN ,. will DC lovenox after pm dose   on bisoprolol 5 daily per EP     7- CAD   serial troponin neg   cont aspirin       8- - New dx of Diabetes Mellitus , hba1c 6.3   fasting  in labs  change diet add accucheck and sliding scale   monitor   diabetic education     9-Nicotine use disorder   -nicotine patch ordered   -smoking cessation provided     DVT ppx   lovenox held for possible ablation   contacted IR re paracentesis   resume after procedure

## 2021-11-22 NOTE — PROGRESS NOTE ADULT - ATTENDING COMMENTS
Patient was seen and examined prior to and after VANESSA/ DCCV and noted to very volume overloaded with distended abdomen and fluid wave   s/p Lasix 40mg IVP x 1 and Recommend to give Lasix 60mg IVP stat  DERRICK likely seocndary to under perfusion due to volume overload Patient was seen and examined prior to and after VANESSA/ DCCV and noted to very volume overloaded with distended abdomen and fluid wave   s/p Lasix 40mg IVP x 1 and Recommend to give Lasix 60mg IVP stat, may even consider Lasix ggt   HF consulted will eval   DERRICK likely secondary to under perfusion due to volume overload, will recommend to follow up Cr later tonight   Aflutter with RVR, on Hep ggt s/p DCCV now in sinus and continue with Bisoprolol and starting Amiodarone as per EP   VBG obtain lactate 1.5  consider ICU eval, if in ICU would recommend swan placement and assess of PCWP     will follow up in PEARL Thompson D.O. [facility]  Cardiology/Vascular Cardiology -[facility]   Telephone # [phone]

## 2021-11-22 NOTE — CONSULT NOTE ADULT - ASSESSMENT
Impression:  1. atrial flutter with RVR sp DCCV with restoration of NSR -tele now show NSR at 92bpm- ? exacerbated by cocaine + tox screen  2. acute on chronic systolic heart failure - hemodynamics stable, on RA, resting comfortably in bed, no CP/anginal equivalents  3. DERRICK   4. R pleural effusion  5. emphysema    Plan:  pt does not meet ICU criteria at this time, please reconsult if status changes, case and plan discussed with Dr Park.    Neuro - avoid neurosuppressants, nonfocal    CV -  hemodynamics stable currently, remains in NSR on tele, lactate normal          cont amio as per EP          ongoing GDT for HF, HF consult pending, cont IV lasix          keep K~4 and Mg>2 for optimal arrhythmia suppresion          full AC with IV heparin as per nomogram          statin            Pulm -  stable on RA, nonlabored in bed currently              at this point no acute indication for drainage of effusion, given need to leave on AC would drain only if decompensates from respiratory standpoint              aggressive smoking cessation counseling, CT with mod emphysematous changes    GI -  PO diet with renal and cardiac restrictions,     Renal - Cr downtrending, avoid MAP<65, renally adjust all meds, no nephrotoxins (ACE/NSAIDS/ARB) for now, strict I/Os, trend BMP    Heme -  Full AC with IV heparin, adjustments as per nomogram, no signs of active bleeding, H/H stable    ID - stable    Endo -  TSH normal, BS stable, A1c 6.3

## 2021-11-22 NOTE — PROGRESS NOTE ADULT - ASSESSMENT
60yo M w/ PMHx of CAD, Nonischemic Cardiomyopathy - CHF (EF in 2016 15-20%) s/p ICD (reports placed about 5 months ago by Dr. Shaikh), nonobstructive CAD, HTN admitted after his ICD fired multiple times, worsening dyspnea due to acute on chronic systolic congestive heart failure exacerbation likely due to dietary noncompliance.  TTE with EF < 20%.      Acute Decompensated HFrEF  - EF <20%.   - Patient with worsening fluid overload now with moderate ascites, rales on exam, and JVD.   - Lasix 40mg IV x 1 stat.   - If with no significant improvement, would give another dose of Lasix 40mg IV x 1 this evening.   - Check lactate and VBG.   - Continue bisoprolol.   - Nephrology consulted.   - Paracentesis for abdominal distension.   - Will add further GDMT when renal function allows.   - Advanced Heart Failure consult placed.     Atrial Flutter  - EP following.   - Initially planned for ablation today, but patient is not euvolemic for the procedure.   - NPO.   - Possible VANESSA/DCCV today.   - Continue bisoprolol.   - Lovenox for AC.     Non-Obstructive CAD  - Continue aspirin, statin, and beta blocker.     Assessment and recommendations are final when note is signed by the attending.

## 2021-11-22 NOTE — PROGRESS NOTE ADULT - SUBJECTIVE AND OBJECTIVE BOX
Patient is a 61y old  Male who presents with a chief complaint of Aflutter, AICD firing     Patient seen and examined at bedside in am, overnight events reported   pt is with dyspnea , abd distention , c/o neck pain right neck and chest upper   labs reviewed , Pt is with worsening cr and hyperkalemia         ALLERGIES:  No Known Allergies  pork (Other)    MEDICATIONS  (STANDING):  ALBUTerol    90 MICROgram(s) HFA Inhaler 2 Puff(s) Inhalation every 6 hours  albuterol/ipratropium for Nebulization 3 milliLiter(s) Nebulizer every 6 hours  aspirin enteric coated 81 milliGRAM(s) Oral daily  atorvastatin 40 milliGRAM(s) Oral at bedtime  bisoprolol   Tablet 5 milliGRAM(s) Oral daily  budesonide 160 MICROgram(s)/formoterol 4.5 MICROgram(s) Inhaler 2 Puff(s) Inhalation two times a day  calcium acetate 667 milliGRAM(s) Oral three times a day with meals  dextrose 40% Gel 15 Gram(s) Oral once  dextrose 5%. 1000 milliLiter(s) (50 mL/Hr) IV Continuous <Continuous>  dextrose 5%. 1000 milliLiter(s) (100 mL/Hr) IV Continuous <Continuous>  dextrose 50% Injectable 50 milliLiter(s) IV Push once  dextrose 50% Injectable 25 Gram(s) IV Push once  dextrose 50% Injectable 12.5 Gram(s) IV Push once  dextrose 50% Injectable 25 Gram(s) IV Push once  glucagon  Injectable 1 milliGRAM(s) IntraMuscular once  influenza   Vaccine 0.5 milliLiter(s) IntraMuscular once  insulin lispro (ADMELOG) corrective regimen sliding scale   SubCutaneous three times a day before meals  insulin regular  human recombinant 10 Unit(s) IV Push once  magnesium sulfate  IVPB 2 Gram(s) IV Intermittent once  melatonin 3 milliGRAM(s) Oral at bedtime  nicotine -  14 mG/24Hr(s) Patch 1 patch Transdermal daily  piperacillin/tazobactam IVPB.. 3.375 Gram(s) IV Intermittent every 8 hours  predniSONE   Tablet 40 milliGRAM(s) Oral daily  predniSONE   Tablet   Oral   sodium zirconium cyclosilicate 5 Gram(s) Oral two times a day  tiotropium 18 MICROgram(s) Capsule 1 Capsule(s) Inhalation daily    MEDICATIONS  (PRN):  acetaminophen     Tablet .. 650 milliGRAM(s) Oral every 6 hours PRN Temp greater or equal to 38C (100.4F), Mild Pain (1 - 3)  aluminum hydroxide/magnesium hydroxide/simethicone Suspension 30 milliLiter(s) Oral every 4 hours PRN Dyspepsia  melatonin 3 milliGRAM(s) Oral at bedtime PRN Insomnia  ondansetron Injectable 4 milliGRAM(s) IV Push every 8 hours PRN Nausea and/or Vomiting    Vital Signs Last 24 Hrs  T(F): 98.2 (22 Nov 2021 09:46), Max: 98.2 (22 Nov 2021 09:46)  HR: 55 (22 Nov 2021 09:46) (54 - 100)  BP: 94/61 (22 Nov 2021 09:46) (89/51 - 99/70)  RR: 20 (22 Nov 2021 09:46) (16 - 22)  SpO2: 96% (22 Nov 2021 09:46) (94% - 98%)  I&O's Summary    21 Nov 2021 07:01  -  22 Nov 2021 07:00  --------------------------------------------------------  IN: 0 mL / OUT: 300 mL / NET: -300 mL    22 Nov 2021 07:01  -  22 Nov 2021 11:57  --------------------------------------------------------  IN: 0 mL / OUT: 200 mL / NET: -200 mL        PHYSICAL EXAM:  General: awake alert , no distress  Neck: right sided discomfort on the right neck   Lungs: CTA bilateral , poor inspiratory effort   Cardio: RRR, S1/S2, No murmur  Abdomen: Soft, Nontender, Nondistended; Bowel sounds present  Extremities: No calf tenderness, No pitting edema  Skin warm color     LABS:                        16.0   12.42 )-----------( 178      ( 22 Nov 2021 07:19 )             49.1     11-22    134  |  97  |  67.5  ----------------------------<  104  5.9   |  19.0  |  1.83    Ca    8.9      22 Nov 2021 07:19  Phos  4.9     11-21  Mg     2.3     11-21    TPro  6.8  /  Alb  3.9  /  TBili  0.9  /  DBili  x   /  AST  39  /  ALT  23  /  AlkPhos  202  11-20        eGFR if Non African American: 40 mL/min/1.73M2 (11-22-21 @ 07:19)  eGFR if African American: 46 mL/min/1.73M2 (11-22-21 @ 07:19)          CARDIAC MARKERS ( 21 Nov 2021 07:20 )  x     / 0.06 ng/mL / x     / x     / x      CARDIAC MARKERS ( 20 Nov 2021 14:02 )  x     / 0.04 ng/mL / x     / x     / x      CARDIAC MARKERS ( 20 Nov 2021 07:58 )  x     / 0.04 ng/mL / 458 U/L / x     / 4.4 ng/mL      10-28 Chol 104 mg/dL LDL -- HDL 23 mg/dL Trig 55 mg/dL  TSH 1.67   TSH with FT4 reflex --  Total T3 --              POCT Blood Glucose.: 98 mg/dL (22 Nov 2021 09:00)  POCT Blood Glucose.: 138 mg/dL (21 Nov 2021 21:42)  POCT Blood Glucose.: 153 mg/dL (21 Nov 2021 17:28)          COVID-19 PCR: NotDetec (11-21-21 @ 06:51)  COVID-19 PCR: NotDetec (11-18-21 @ 20:48)  COVID-19 PCR: NotDetec (11-01-21 @ 06:21)    RADIOLOGY & ADDITIONAL TESTS:      u< from: US Abdomen Complete (US Abdomen Complete .) (11.16.16 @ 10:36) >  Impression: Mild to moderate ascites with largest pockets in the lower   quadrants.    Right pleural effusion.      < end of copied text >

## 2021-11-22 NOTE — CHART NOTE - NSCHARTNOTEFT_GEN_A_CORE
seen and examined, remains in AFL with sub-optimal rate control despite max tolerated BB (bisoprolol). Now with DERRICK and hyper-kalemia, probably cardio-renal in the setting of severe biventricular dysfunction, right pleural effusion, new/worsening ascites. He is also having worse SOB at rest and feels full in his abdomin. His chest and abdo/pelvic CT scans showed right side pleural effusion and ascitics but no obstruction or acute findings that require surgical intervention.     Did not get this morning Lovenox in preparation for AFL ablation. He is not in a good shape for ablation today due to worsening HF, probably early cardiogenic shock, DERRICK, high potassium, and symptomatic ascitis/pleural effusion.    Seen by renal, and started on treatment for hyperkalemia, losartan held.  Still NPO    D/W anesthesia, cardiology, primary team, and pt.    1. Keep NPO  2. VANESSA/DCCV today, benefits of conscious sedation outweigh risk of keeping him in rapid AFL given the high risk of worsening HF and cardiogenic shock without rhythm control. unable to rate control   3. Consider HF consult after  4. Change Lovenox to full dose IV heparin with bolus at time of DCCV.   5. May need inotropic support +/- PAC for HF management guidance if did not respond to IV diuresis and rhythm control.   6. Should consider paracentesis and/or pleural drainage (if needed MUST be done on full dose heparin). Can NOT stop anticoagulation to do this.   7. Hyperkalemia measures and monitor blood work   8. Will consider starting oral amiodarone after DCCV.   9. Will need ICD reprogramming prior to discharge

## 2021-11-22 NOTE — CONSULT NOTE ADULT - ASSESSMENT
61M with pmh of CAD NICM, HFrEF (15%) s/p AICD, HTN presented to ED c/o palpitations and defibrillator firing. Pt reports to ICD firing 15 times. Pt states he just arrived to [facility] and was outside talking to one of the guys that lives in the same house as him when he suddenly felt sharp electrical shock in his chest, this happed 14 times within 40 minutes with associated palpitations and lightheadedness. Pt stated he had AICD placed 6month ago by Dr. Shaikh at [facility] and never experiencing defibrillator firing until today. Pt also reports to productive cough, worsening exertional dyspnea. He was admitted to  for chf discharged on 11/3 to [facility], states his dietary indiscretion has been very poor as he lives from [facility] to [facility]. He admits to medications compliance. Denies cp, fever, chills, abdominal pain. as per EMS pt was in  AFib w/ RVR and administered Diltiazem IV with rate control into the 90s and pt was given lopressor 5mg IVP in the ED.  Currently w/ A Flutter on Tele monitor and ECG also new onset A  Now has rising creatinine- prev 1.17 to 1.37> 1.83  Likely 2/2 Losartan and Lasix   K also high - off Losartan after yesterdy's dose; Recd D50 and Lokelma  CT > Kidneys bladder ok. Bladder scan - no retention  Continue to watch, hopefully improves off ARB  check UA, Urine Na/Osm/K  Lasix only as needed  shall follow; Thanks

## 2021-11-22 NOTE — CONSULT NOTE ADULT - SUBJECTIVE AND OBJECTIVE BOX
Patient is a 61y old  Male who presents with a chief complaint of Aflutter, AICD firing (2021 19:11)      BRIEF HOSPITAL COURSE:   61M with PMHx  HTN, CAD (50%mLAD, 60%pRCA and 85% mRCA on cath 2016 - no intervention), ICM, HFrEF (EF 15%) s/p Biotronik ICD (VDD; Shaikh; 2021) who was admitted with new atrial tachycardia following multiple inappropriate ICD shocks for AFL w/ periods of rapid conduction at home. Course complicated by DERRICK, acute on chronic HF exacerbated by RVR.  He was subsequently noted to have progressive heart failure, likely exacerbated by difficult to control RVR , now status post VANESSA negative for FADUMO thrombus and uncomplicated DCCV with restoration of sinus rhythm.     Events last 24 hours: earlier today underwent DCCV following negative VANESSA (no FADUMO thrombus) with restoration of SR, no intra/post procedural complications. Remains hemodynamically stable in cath lab recovery in NSR, AC continued with IV heparin. ICU consulted to evaluate following transfer to floor by cath lab service.     PAST MEDICAL & SURGICAL HISTORY:  Heart failure, systolic    CAD (coronary artery disease)    Hypertension    Nonischemic cardiomyopathy    No significant past surgical history        Review of Systems:  12 pt ROS negative unless otherwise stated above      Medications:    aMIOdarone    Tablet 400 milliGRAM(s) Oral three times a day  bisoprolol   Tablet 5 milliGRAM(s) Oral daily    ALBUTerol    90 MICROgram(s) HFA Inhaler 2 Puff(s) Inhalation every 6 hours  albuterol/ipratropium for Nebulization 3 milliLiter(s) Nebulizer every 6 hours  budesonide 160 MICROgram(s)/formoterol 4.5 MICROgram(s) Inhaler 2 Puff(s) Inhalation two times a day  tiotropium 18 MICROgram(s) Capsule 1 Capsule(s) Inhalation daily    acetaminophen     Tablet .. 650 milliGRAM(s) Oral every 6 hours PRN  melatonin 3 milliGRAM(s) Oral at bedtime  melatonin 3 milliGRAM(s) Oral at bedtime PRN  ondansetron Injectable 4 milliGRAM(s) IV Push every 8 hours PRN      heparin   Injectable 6000 Unit(s) IV Push every 6 hours PRN  heparin   Injectable 3000 Unit(s) IV Push every 6 hours PRN  heparin  Infusion.  Unit(s)/Hr IV Continuous <Continuous>    aluminum hydroxide/magnesium hydroxide/simethicone Suspension 30 milliLiter(s) Oral every 4 hours PRN      atorvastatin 40 milliGRAM(s) Oral at bedtime  dextrose 40% Gel 15 Gram(s) Oral once  dextrose 50% Injectable 25 Gram(s) IV Push once  dextrose 50% Injectable 12.5 Gram(s) IV Push once  dextrose 50% Injectable 25 Gram(s) IV Push once  glucagon  Injectable 1 milliGRAM(s) IntraMuscular once  insulin lispro (ADMELOG) corrective regimen sliding scale   SubCutaneous three times a day before meals    calcium acetate 667 milliGRAM(s) Oral three times a day with meals  dextrose 5%. 1000 milliLiter(s) IV Continuous <Continuous>  dextrose 5%. 1000 milliLiter(s) IV Continuous <Continuous>  magnesium sulfate  IVPB 2 Gram(s) IV Intermittent once    influenza   Vaccine 0.5 milliLiter(s) IntraMuscular once      nicotine -  14 mG/24Hr(s) Patch 1 patch Transdermal daily  sodium zirconium cyclosilicate 5 Gram(s) Oral two times a day          ICU Vital Signs Last 24 Hrs  T(C): 36.6 (2021 21:00), Max: 36.8 (2021 09:46)  T(F): 97.8 (2021 21:00), Max: 98.2 (2021 09:46)  HR: 100 (2021 21:00) (54 - 131)  BP: 98/68 (2021 21:00) (89/51 - 118/63)  BP(mean): 82 (2021 19:30) (81 - 82)  ABP: --  ABP(mean): --  RR: 15 (2021 21:00) (13 - 22)  SpO2: 100% (2021 21:00) (94% - 100%)      I&O's Summary    2021 07:  -  2021 07:00  --------------------------------------------------------  IN: 0 mL / OUT: 300 mL / NET: -300 mL    2021 07:01  -  2021 21:56  --------------------------------------------------------  IN: 0 mL / OUT: 300 mL / NET: -300 mL        LABS:                        16.0   12.42 )-----------( 178      ( 2021 07:19 )             49.1         138  |  102  |  67.6<H>  ----------------------------<  71  4.3   |  18.0<L>  |  1.67<H>    Ca    7.5<L>      2021 18:02  Phos  4.9       Mg     2.3         TPro  5.6<L>  /  Alb  3.5  /  TBili  1.2  /  DBili  x   /  AST  22  /  ALT  19  /  AlkPhos  144<H>        CARDIAC MARKERS ( 2021 07:20 )  x     / 0.06 ng/mL / x     / x     / x          CAPILLARY BLOOD GLUCOSE      POCT Blood Glucose.: 79 mg/dL (2021 16:14)      Urinalysis Basic - ( 2021 20:37 )    Color: Yellow / Appearance: Clear / S.020 / pH: x  Gluc: x / Ketone: Trace  / Bili: Negative / Urobili: Negative mg/dL   Blood: x / Protein: 30 mg/dL / Nitrite: Negative   Leuk Esterase: Trace / RBC: 0-2 /HPF / WBC 0-2   Sq Epi: x / Non Sq Epi: Occasional / Bacteria: Few          Physical Examination:    General: No acute distress.  Alert, oriented, interactive, nonfocal    HEENT: Pupils equal, reactive to light.  Symmetric.    PULM: mildly diminished at bases, no salena rales or wheezing    CVS: Regular rate and rhythm, NSR on tele    ABD: Softly distended, nontender, normoactive bowel sounds, no rebound or guarding    EXT: + LE edema, nontender    SKIN: Warm and well perfused, no rashes noted.    RADIOLOGY:   EXAM:  ECHO TRANSESOPHAGEAL    EXAM:  DOPPLER ECHO COMP W SPECTRAL    EXAM:  DOPPLER ECHOCARD COLOR FLOW      PROCEDURE DATE:  2021   .      INTERPRETATION:  TRANSESOPHAGEAL ECHOCARDIOGRAM REPORT        Patient Name:   DOROTHY VOSS Patient Location: Inpatient  Medical Rec #:  [id number]       Accession #:      [id number]  Account #:                     Height:           72.0 in 182.9 cm  YOB: 1960      Weight:           160.0 lb 72.58 kg  Patient Age:    61 years       BSA:              1.94 m²  Patient Gender: M              BP:               63/29 mmHg      Date of Exam:        2021 6:34:52 PM  Sonographer:         Merlin Leyva  Referring Physician: Romie Tran MD  Copy report sent to: [id number] Aracelis Thompson MD    Procedure:   Transesophageal Echocardiogram.  Indications: Unspecified atrial flutter - I48.92  Diagnosis:   Cardiomyopathy, unspecified - I42.9    SPECTRAL DOPPLER ANALYSIS (where applicable):    PHYSICIAN INTERPRETATION:  Left Ventricle:  Global LV systolic function was severely decreased. Left ventricular ejection fraction, by visual estimation, is <20%.  In comparison to the previous echocardiogram(s): There are no prior VANESSA studies on this patient for comparison purposes.      Summary:   1. Very limited study due to patient hemodynamic instability.   2. Left ventricular ejection fraction, by visual estimation, is <20% with dilated cardiomyopathy   3. Limited visualization of the left atrial appendage, no evidence of thrombus.    Micah Lua DO Electronically signed on 2021 at 6:58:45 PM        *** Final ***              MISAEL ALBERTO   This document has been electronically signed. 2021  6:34PM      EXAM:  CT ABDOMEN AND PELVIS                         EXAM:  CT CHEST                          PROCEDURE DATE:  2021          INTERPRETATION:  CLINICAL INFORMATION: Shortness of breath, right-sided chest pain with breathing.    COMPARISON: 2016.    CONTRAST/COMPLICATIONS:  IV Contrast: NONE  Oral Contrast: NONE  Complications: None reported at time of study completion    PROCEDURE:  CT of the Chest, Abdomen and Pelvis was performed.  Sagittal and coronal reformats were performed.    FINDINGS:  CHEST:  LUNGS AND LARGE AIRWAYS: Patent central airways. Moderate centrilobular emphysematous changes, most prominent in the right upper lobe. Stable 4 mm pleural tag in the left lower lobe on image 89. No new discrete pulmonary nodule or confluent airspace opacity is identified.  PLEURA: Stable mild to moderate layering right pleural effusion with moderate rounded subpleural atelectasis in the right middle lobe and right lower lobe.  VESSELS: Within normal limits.  HEART: Left-sided dual-lead cardiac pacer. The heart is enlarged. No pericardial effusion.  MEDIASTINUM AND AIDA: No lymphadenopathy.  CHEST WALL AND LOWER NECK: Severe bilateral gynecomastia.    ABDOMEN AND PELVIS:  LIVER: The liver is bulbous in contour with prominence of the left lobe laterally and caudate lobe, suggestive of a cirrhotic morphology.  BILE DUCTS: Normal caliber.  GALLBLADDER: Small layering gallstones in the gallbladder.  SPLEEN: Within normal limits.  PANCREAS: Moderate atrophy and fatty replacement of the pancreas.  ADRENALS: Within normal limits.  KIDNEYS/URETERS: Within normal limits.    BLADDER: Minimally distended.  REPRODUCTIVE ORGANS: The prostate is not enlarged.    BOWEL: Scattered sigmoid diverticula. No bowel obstruction.  PERITONEUM: Moderate free fluid in the peritoneal cavity, suggestive of ascites.  VESSELS: Within normal limits.  RETROPERITONEUM/LYMPH NODES: No lymphadenopathy.  ABDOMINAL WALL: Right inguinal hernia containing peritoneal fluid.  BONES: Within normal limits.    IMPRESSION:  1. Stable mild to moderate right pleural effusion with moderate rounded atelectasis in the right middle lobe and right lower lobe.  2. Stable cardiomegaly.  3. Cirrhotic liver morphology with moderate ascites.    --- End of Report ---            ELLIOTT HICKEY MD; Attending Radiologist  This document has been electronically signed. 2021  2:46PM

## 2021-11-23 LAB
ANION GAP SERPL CALC-SCNC: 22 MMOL/L — HIGH (ref 5–17)
APTT BLD: 115.6 SEC — HIGH (ref 27.5–35.5)
APTT BLD: 122.3 SEC — CRITICAL HIGH (ref 27.5–35.5)
APTT BLD: >200 SEC — CRITICAL HIGH (ref 27.5–35.5)
BUN SERPL-MCNC: 88.1 MG/DL — HIGH (ref 8–20)
CALCIUM SERPL-MCNC: 9 MG/DL — SIGNIFICANT CHANGE UP (ref 8.6–10.2)
CHLORIDE SERPL-SCNC: 93 MMOL/L — LOW (ref 98–107)
CO2 SERPL-SCNC: 18 MMOL/L — LOW (ref 22–29)
CREAT SERPL-MCNC: 2.18 MG/DL — HIGH (ref 0.5–1.3)
GLUCOSE BLDC GLUCOMTR-MCNC: 118 MG/DL — HIGH (ref 70–99)
GLUCOSE BLDC GLUCOMTR-MCNC: 135 MG/DL — HIGH (ref 70–99)
GLUCOSE BLDC GLUCOMTR-MCNC: 149 MG/DL — HIGH (ref 70–99)
GLUCOSE BLDC GLUCOMTR-MCNC: 151 MG/DL — HIGH (ref 70–99)
GLUCOSE SERPL-MCNC: 135 MG/DL — HIGH (ref 70–99)
HCT VFR BLD CALC: 48 % — SIGNIFICANT CHANGE UP (ref 39–50)
HGB BLD-MCNC: 15.6 G/DL — SIGNIFICANT CHANGE UP (ref 13–17)
MCHC RBC-ENTMCNC: 29.8 PG — SIGNIFICANT CHANGE UP (ref 27–34)
MCHC RBC-ENTMCNC: 32.5 GM/DL — SIGNIFICANT CHANGE UP (ref 32–36)
MCV RBC AUTO: 91.8 FL — SIGNIFICANT CHANGE UP (ref 80–100)
PLATELET # BLD AUTO: 176 K/UL — SIGNIFICANT CHANGE UP (ref 150–400)
POTASSIUM SERPL-MCNC: 5.3 MMOL/L — SIGNIFICANT CHANGE UP (ref 3.5–5.3)
POTASSIUM SERPL-SCNC: 5.3 MMOL/L — SIGNIFICANT CHANGE UP (ref 3.5–5.3)
RBC # BLD: 5.23 M/UL — SIGNIFICANT CHANGE UP (ref 4.2–5.8)
RBC # FLD: 15.3 % — HIGH (ref 10.3–14.5)
SODIUM SERPL-SCNC: 133 MMOL/L — LOW (ref 135–145)
WBC # BLD: 8.42 K/UL — SIGNIFICANT CHANGE UP (ref 3.8–10.5)
WBC # FLD AUTO: 8.42 K/UL — SIGNIFICANT CHANGE UP (ref 3.8–10.5)

## 2021-11-23 PROCEDURE — 99232 SBSQ HOSP IP/OBS MODERATE 35: CPT | Mod: 24

## 2021-11-23 PROCEDURE — 99232 SBSQ HOSP IP/OBS MODERATE 35: CPT

## 2021-11-23 PROCEDURE — 99233 SBSQ HOSP IP/OBS HIGH 50: CPT

## 2021-11-23 PROCEDURE — 93880 EXTRACRANIAL BILAT STUDY: CPT | Mod: 26

## 2021-11-23 RX ORDER — HYDROMORPHONE HYDROCHLORIDE 2 MG/ML
0.5 INJECTION INTRAMUSCULAR; INTRAVENOUS; SUBCUTANEOUS ONCE
Refills: 0 | Status: DISCONTINUED | OUTPATIENT
Start: 2021-11-23 | End: 2021-11-23

## 2021-11-23 RX ORDER — MORPHINE SULFATE 50 MG/1
1 CAPSULE, EXTENDED RELEASE ORAL ONCE
Refills: 0 | Status: DISCONTINUED | OUTPATIENT
Start: 2021-11-23 | End: 2021-11-23

## 2021-11-23 RX ORDER — TRAMADOL HYDROCHLORIDE 50 MG/1
50 TABLET ORAL EVERY 4 HOURS
Refills: 0 | Status: DISCONTINUED | OUTPATIENT
Start: 2021-11-23 | End: 2021-11-24

## 2021-11-23 RX ORDER — ASPIRIN/CALCIUM CARB/MAGNESIUM 324 MG
81 TABLET ORAL DAILY
Refills: 0 | Status: DISCONTINUED | OUTPATIENT
Start: 2021-11-23 | End: 2021-12-02

## 2021-11-23 RX ORDER — METHOCARBAMOL 500 MG/1
500 TABLET, FILM COATED ORAL THREE TIMES A DAY
Refills: 0 | Status: COMPLETED | OUTPATIENT
Start: 2021-11-23 | End: 2021-11-26

## 2021-11-23 RX ADMIN — HEPARIN SODIUM 700 UNIT(S)/HR: 5000 INJECTION INTRAVENOUS; SUBCUTANEOUS at 19:51

## 2021-11-23 RX ADMIN — Medication 667 MILLIGRAM(S): at 10:40

## 2021-11-23 RX ADMIN — HEPARIN SODIUM 900 UNIT(S)/HR: 5000 INJECTION INTRAVENOUS; SUBCUTANEOUS at 13:44

## 2021-11-23 RX ADMIN — HEPARIN SODIUM 1100 UNIT(S)/HR: 5000 INJECTION INTRAVENOUS; SUBCUTANEOUS at 03:40

## 2021-11-23 RX ADMIN — Medication 3 MILLILITER(S): at 16:22

## 2021-11-23 RX ADMIN — METHOCARBAMOL 500 MILLIGRAM(S): 500 TABLET, FILM COATED ORAL at 21:18

## 2021-11-23 RX ADMIN — Medication 81 MILLIGRAM(S): at 21:17

## 2021-11-23 RX ADMIN — Medication 1 PATCH: at 14:02

## 2021-11-23 RX ADMIN — HYDROMORPHONE HYDROCHLORIDE 0.5 MILLIGRAM(S): 2 INJECTION INTRAMUSCULAR; INTRAVENOUS; SUBCUTANEOUS at 09:45

## 2021-11-23 RX ADMIN — Medication 1 PATCH: at 11:59

## 2021-11-23 RX ADMIN — Medication 3 MILLILITER(S): at 08:59

## 2021-11-23 RX ADMIN — Medication 1 PATCH: at 11:00

## 2021-11-23 RX ADMIN — Medication 650 MILLIGRAM(S): at 06:24

## 2021-11-23 RX ADMIN — Medication 1 PATCH: at 19:00

## 2021-11-23 RX ADMIN — Medication 667 MILLIGRAM(S): at 17:42

## 2021-11-23 RX ADMIN — ATORVASTATIN CALCIUM 40 MILLIGRAM(S): 80 TABLET, FILM COATED ORAL at 21:17

## 2021-11-23 RX ADMIN — Medication 2: at 17:41

## 2021-11-23 RX ADMIN — TRAMADOL HYDROCHLORIDE 50 MILLIGRAM(S): 50 TABLET ORAL at 21:09

## 2021-11-23 RX ADMIN — Medication 3 MILLILITER(S): at 21:31

## 2021-11-23 RX ADMIN — TRAMADOL HYDROCHLORIDE 50 MILLIGRAM(S): 50 TABLET ORAL at 20:09

## 2021-11-23 RX ADMIN — MORPHINE SULFATE 1 MILLIGRAM(S): 50 CAPSULE, EXTENDED RELEASE ORAL at 23:36

## 2021-11-23 RX ADMIN — AMIODARONE HYDROCHLORIDE 400 MILLIGRAM(S): 400 TABLET ORAL at 21:17

## 2021-11-23 RX ADMIN — Medication 3 MILLILITER(S): at 03:49

## 2021-11-23 RX ADMIN — AMIODARONE HYDROCHLORIDE 400 MILLIGRAM(S): 400 TABLET ORAL at 14:05

## 2021-11-23 RX ADMIN — HYDROMORPHONE HYDROCHLORIDE 0.5 MILLIGRAM(S): 2 INJECTION INTRAMUSCULAR; INTRAVENOUS; SUBCUTANEOUS at 08:47

## 2021-11-23 RX ADMIN — AMIODARONE HYDROCHLORIDE 400 MILLIGRAM(S): 400 TABLET ORAL at 05:24

## 2021-11-23 RX ADMIN — SODIUM ZIRCONIUM CYCLOSILICATE 5 GRAM(S): 10 POWDER, FOR SUSPENSION ORAL at 17:42

## 2021-11-23 RX ADMIN — Medication 3 MILLIGRAM(S): at 21:18

## 2021-11-23 RX ADMIN — Medication 650 MILLIGRAM(S): at 05:24

## 2021-11-23 RX ADMIN — SODIUM ZIRCONIUM CYCLOSILICATE 5 GRAM(S): 10 POWDER, FOR SUSPENSION ORAL at 11:59

## 2021-11-23 RX ADMIN — HEPARIN SODIUM 0 UNIT(S)/HR: 5000 INJECTION INTRAVENOUS; SUBCUTANEOUS at 02:19

## 2021-11-23 RX ADMIN — BISOPROLOL FUMARATE 5 MILLIGRAM(S): 10 TABLET, FILM COATED ORAL at 05:25

## 2021-11-23 NOTE — DIETITIAN NUTRITION RISK NOTIFICATION - TREATMENT: THE FOLLOWING DIET HAS BEEN RECOMMENDED
Diet, DASH/TLC:   Sodium & Cholesterol Restricted  Consistent Carbohydrate {Evening Snack} (CSTCHOSN) (11-23-21 @ 14:00) [Active]

## 2021-11-23 NOTE — PROGRESS NOTE ADULT - ASSESSMENT
61M with pmh of CAD NICM, HFrEF (15%) s/p AICD, HTN presented to ED c/o palpitations and defibrillator firing. Pt reports to ICD firing 15 times. Pt states he just arrived to [facility] and was outside talking to one of the guys that lives in the same house as him when he suddenly felt sharp electrical shock in his chest, this happed 14 times within 40 minutes with associated palpitations and lightheadedness. Pt stated he had AICD placed 6month ago by Dr. Shaikh at [facility] and never experiencing defibrillator firing until today. Pt also reports to productive cough, worsening exertional dyspnea. He was admitted to  for chf discharged on 11/3 to [facility], states his dietary indiscretion has been very poor as he lives from [facility] to [facility]. He admits to medications compliance. Denies cp, fever, chills, abdominal pain. as per EMS pt was in  AFib w/ RVR and administered Diltiazem IV with rate control into the 90s and pt was given lopressor 5mg IVP in the ED.  Currently w/ A Flutter on Tele monitor and ECG also new onset A  Now has rising creatinine- prev 1.17 to 1.37> 1.83  Likely 2/2 Losartan and Lasix   K also high - off Losartan after yesterdy's dose; Recd D50 and Lokelma  CT > Kidneys bladder ok. Bladder scan - no retention  Continue to watch, hopefully improves off ARB - last evening K creat better  Pt concerned regarding ascitis  UA, Urine Na/Osm/K reviewed  EP noted  labs am

## 2021-11-23 NOTE — DIETITIAN INITIAL EVALUATION ADULT. - PERTINENT LABORATORY DATA
11-22 Na138 mmol/L Glu 71 mg/dL K+ 4.3 mmol/L Cr  1.67 mg/dL<H> BUN 67.6 mg/dL<H> Phos n/a   Alb 3.5 g/dL PAB n/a

## 2021-11-23 NOTE — CHART NOTE - NSCHARTNOTEFT_GEN_A_CORE
Called by RN after pt c/o right sided neck pain.  Reviewed chart, pt been c/o neck pain, on tramadol prn pain.  Pt c/o right sided neck pain for last 3 days, PMD aware, states pain is due to JVD.  Denies trauma.  Rates @ 5/10.    VSS  Neck - JVD b/l, ROM intact, no point tenderness  Neck pain likely musculoskeletal  JVD from RH failure  Tramadol @20:30, will reassess and medicate prn.    23:15  Called by RN after pt c/o pain not improved with tramadol; Morphine 1mg IVPx1 ordered.  Monitor and escalate prn

## 2021-11-23 NOTE — DIETITIAN INITIAL EVALUATION ADULT. - OTHER INFO
60yo M w/ PMHx of CAD, Nonischemic Cardiomyopathy - CHF (EF in 2016 15-20%) s/p ICD (reports placed about 5 months ago by Dr. Shaikh),and  HTN admitted after his ICD fired multiple times, worsening dyspnea ,found to have rapid atrial f;christianer  pt is seen by cardiology and EPS , treated with iv lasix, oxygen ., medication adjusted for rate control. Pt had VANESSA with cardio conversion.

## 2021-11-23 NOTE — PROGRESS NOTE ADULT - ASSESSMENT
62yo M w/ PMHx of CAD, Nonischemic Cardiomyopathy - CHF (EF in 2016 15-20%) s/p ICD (reports placed about 5 months ago by Dr. Shaikh),and  HTN admitted after his ICD fired multiple times, worsening dyspnea ,found to have rapid atrial f;lutter  pt is seen by cardiology and EPS , treated with iv lasix , oxygen ., medication adjusted , pt is with worsening renal disease , fluid overload Rate uncontrolled , VANESSA CV performed , cont anticoagulation v heparin     1- Acute on cronic systolic heart failure   cont IV lasix , rate control  moderate ascites may benefit from paracentesis however can not stop anticoagulation per EP   will d/w IR if can do with anticoagulation on   otherwise can be done later in 1 week if he remains in the hospital      2- Right neck pain,CP on the right  musculoskletal ?   US of doppler ordered   CT of chest result  reviewed   stable effusion   pneumonia is ruled out     3-Acute hypoxic resp failure   likely  multifactorial CHF , A flutter , underlying lung disease ?COPD ,  active smoker   cont neb therapy s/p iv steroid   stop[ed  prednisone may exacerbate CHF   cont oxygen supplement   incentive spirometry   likely  will need to go home with oxygen if no improvement     4- DERRICK with hyperkalemia   on diuretics   avoid nephrotoxic meds , losartan stopped 11/21   cont to monitor closely   BMP in am    hyperkalemia treated   on po lokelma daily   repeat BMP is P   renal consult appreciated     5--ICD discharge   -defibrillator discharged ~14 times at home per patient. interrogated seen by EP on admission   cont to follow   stable   -cont with tele monitor      6--Atrial flutter with high rate , new onset   s/p VANESSA cardioversion   cont bisoprolol and amio added post ablation   cont anticoagulation do not stop per EP for paracentesis     7- CAD with CP   stable , cont aspirin statin   pain atypical resolved   troponin neg     8- - Controlled Diabetes Mellitus , hba1c 6.3   cont diet , diabeic education   ISS     9-Nicotine use disorder   -nicotine patch   -smoking cessation provided     10- Moderate Protein spencer malnutrition   cont diet      DVT ppx on iv heparin     discharge planning   home once improved stable      ambulate           62yo M w/ PMHx of CAD, Nonischemic Cardiomyopathy - CHF (EF in 2016 15-20%) s/p ICD (reports placed about 5 months ago by Dr. Shaikh),and  HTN admitted after his ICD fired multiple times, worsening dyspnea ,found to have rapid atrial f;lutter  pt is seen by cardiology and EPS , treated with iv lasix , oxygen ., medication adjusted , pt is with worsening renal disease , fluid overload Rate uncontrolled , VANESSA CV performed , cont anticoagulation v heparin     1- Acute on cronic systolic heart failure   cont IV lasix , rate control  moderate ascites may benefit from paracentesis however can not stop anticoagulation per EP   will defer paracentesis for now , IR informed   cardiology / HF team follow up     2- ARF / hyperkalemia   cr is rising likely due to losartan / diuretics   avoid ARB/ ACE inh   off lasix diuretics as needed   nephrology follow up   lokelma dextrose insulin given for high K       3 Right neck pain,CP on the right likely muscular ?   US of C doppler ordered  CT of chest result  reviewed   stable effusion   pneumonia is ruled out   4-Atrial flutter with high rate , new onset   s/p VANESSA cardioversion   NSR controlled , cont amio,bystolic , anticoagulation iv heparin     5-Acute hypoxic resp failure   likely  multifactorial CHF , A flutter , underlying lung disease ?COPD ,  active smoker   cont neb therapy s/p iv steroid   stoppd  prednisone may exacerbate CHF   cont oxygen supplement   incentive spirometry   likely  will need to go home with oxygen if no improvement     6--ICD discharge   -defibrillator discharged ~14 times at home per patient. interrogated seen by EP on admission   cont to follow   stable   -cont with tele monitor      7- CAD , non obstructive    cont aspirin statin   CP atypical resolved   troponin neg     8- - Controlled Diabetes Mellitus , new ?   hba1c 6.3   cont diet , diabetic education   ISS     9-Nicotine use disorder   -nicotine patch   -smoking cessation provided     10- Substance use   urine tox cocaine and THC +     11-Moderate protein spencer malnutrition   cont diet   cont diet      DVT ppx on iv heparin     discharge   home once improved stable

## 2021-11-23 NOTE — PROGRESS NOTE ADULT - ASSESSMENT
61M with pmh of  HTN, CAD (50%mLAD, 60%pRCA and 85% mRCA on cath 2016 - no intervention), NICM, HFrEF (15%) s/p Biotronik ICD (VDD; Neel; 6/2021),presented to [facility] ED w/ new atrial tachycardia (atrial flutter- possibly atypical) and several inappropriate ICD shocks for AFL w/ periods of rapid conduction.     Today's history: s/p VANESSA DCCV yesterday  - seen and examined, remains in sinus, feeling a bit better but still SOB and feels abdominal fullness. C/O right neck pain  - Tele, no events  - started on oral amiodarone, remains on IV heparin full dose. counseled about use of amiodarone  - Urine tox screen last night positive for cocaine and THC. Cocaine was negative on 10/27. Pt has been admitted since 11/18   - Still concerns about very little UOP    1. AFL, s/p VANESSA DCCV yesterday, in sinus now  - c/w amiodarone 400 mg TID for total of one week, then 200 mg daily, will need periodic testing, and skin protection. Will provide written education material about use of amiodarone  - c/w bisoprolol 5 mg daily  - c/w full  AC, cannot stop for any reason, unless life threatening cause/need    2. HF, still in massive volume overload, will defer to primary team, cardiology and HF. Pt reports very little UOP    3. Inappropriate ICD shocks,  - will reprogram ICD tomorrow      Will follow up. Will advise pt to go back to follow up with his primary EP doctor (Dr. Shaikh) upon discharge. Possibly to benefit from AF ablation.

## 2021-11-23 NOTE — PROGRESS NOTE ADULT - SUBJECTIVE AND OBJECTIVE BOX
Today's history: s/p VANESSA DCCV yesterday  - seen and examined, remains in sinus, feeling a bit better but still SOB and feels abdominal fullness. C/O right neck pain  - Tele, no events  - started on oral amiodarone, remains on IV heparin full dose. counseled about use of amiodarone  - Urine tox screen last night positive for cocaine and THC. Cocaine was negative on 10/27. Pt has been admitted since 11/18   - Still concerns about very little UOP      MEDICATIONS  (STANDING):  ALBUTerol    90 MICROgram(s) HFA Inhaler 2 Puff(s) Inhalation every 6 hours  albuterol/ipratropium for Nebulization 3 milliLiter(s) Nebulizer every 6 hours  aMIOdarone    Tablet 400 milliGRAM(s) Oral three times a day  atorvastatin 40 milliGRAM(s) Oral at bedtime  bisoprolol   Tablet 5 milliGRAM(s) Oral daily  budesonide 160 MICROgram(s)/formoterol 4.5 MICROgram(s) Inhaler 2 Puff(s) Inhalation two times a day  calcium acetate 667 milliGRAM(s) Oral three times a day with meals  dextrose 40% Gel 15 Gram(s) Oral once  dextrose 5%. 1000 milliLiter(s) (100 mL/Hr) IV Continuous <Continuous>  dextrose 5%. 1000 milliLiter(s) (50 mL/Hr) IV Continuous <Continuous>  dextrose 50% Injectable 25 Gram(s) IV Push once  dextrose 50% Injectable 12.5 Gram(s) IV Push once  dextrose 50% Injectable 25 Gram(s) IV Push once  glucagon  Injectable 1 milliGRAM(s) IntraMuscular once  heparin  Infusion.  Unit(s)/Hr (13 mL/Hr) IV Continuous <Continuous>  influenza   Vaccine 0.5 milliLiter(s) IntraMuscular once  insulin lispro (ADMELOG) corrective regimen sliding scale   SubCutaneous three times a day before meals  magnesium sulfate  IVPB 2 Gram(s) IV Intermittent once  melatonin 3 milliGRAM(s) Oral at bedtime  nicotine -  14 mG/24Hr(s) Patch 1 patch Transdermal daily  sodium zirconium cyclosilicate 5 Gram(s) Oral two times a day  tiotropium 18 MICROgram(s) Capsule 1 Capsule(s) Inhalation daily    MEDICATIONS  (PRN):  acetaminophen     Tablet .. 650 milliGRAM(s) Oral every 6 hours PRN Temp greater or equal to 38C (100.4F), Mild Pain (1 - 3)  aluminum hydroxide/magnesium hydroxide/simethicone Suspension 30 milliLiter(s) Oral every 4 hours PRN Dyspepsia  heparin   Injectable 6000 Unit(s) IV Push every 6 hours PRN For aPTT less than 40  heparin   Injectable 3000 Unit(s) IV Push every 6 hours PRN For aPTT between 40 - 57  melatonin 3 milliGRAM(s) Oral at bedtime PRN Insomnia  ondansetron Injectable 4 milliGRAM(s) IV Push every 8 hours PRN Nausea and/or Vomiting  traMADol 50 milliGRAM(s) Oral every 4 hours PRN Severe Pain (7 - 10)        Allergies  - No Known Allergies    Intolerances  - pork (Other)    PAST MEDICAL & SURGICAL HISTORY:  Heart failure, systolic, NICM  CAD (coronary artery disease)  Hypertension  Nonischemic cardiomyopathy  AF, diagnosed this admission     No significant past surgical history        Vital Signs Last 24 Hrs  T(C): 36.8 (22 Nov 2021 09:46), Max: 36.8 (22 Nov 2021 09:46)  T(F): 98.2 (22 Nov 2021 09:46), Max: 98.2 (22 Nov 2021 09:46)  HR: 55 (22 Nov 2021 09:46) (54 - 100)  BP: 94/61 (22 Nov 2021 09:46) (89/51 - 99/70)  BP(mean): --  RR: 20 (22 Nov 2021 09:46) (16 - 22)  SpO2: 96% (22 Nov 2021 09:46) (94% - 98%)    Physical Exam:  Constitutional: NAD, AAOx3  Cardiovascular: regular pulse, + JVD   Pulmonary: decrease BS to right base, mild resp distress.     GI: distended, + ascites   Extremities: trace-1+ pitting edema to calfs    LABS:                        16.0   12.42 )-----------( 178      ( 22 Nov 2021 07:19 )             49.1   11-22    134<L>  |  97<L>  |  67.5<H>  ----------------------------<  104<H>  5.9<H>   |  19.0<L>  |  1.83<H>    Ca    8.9      22 Nov 2021 07:19  Phos  4.9     11-21  Mg     2.3     11-21              RADIOLOGY & ADDITIONAL TESTS:  < from: TTE Echo Complete w/o Contrast w/ Doppler (11.20.21 @ 08:33) >   1. Left ventricular ejection fraction, by visual estimation, is <20%.   2. Technically good study.   3. Severely enlarged left atrium.   4. Severely enlarged right atrium.   5. Severely enlarged right ventricle.   6. Severely reduced RV systolic function.   7. Moderate to severe mitral valve regurgitation.   8. Thickening of the anterior mitral valve leaflet.   9. Moderate tricuspid regurgitation.  10. Estimated pulmonary artery systolic pressure is 37.8 mmHg assuming a right atrial pressure of 8 mmHg, which is consistent with borderline pulmonary hypertension.    < end of copied text >    < from: CT Abdomen and Pelvis No Cont (11.22.21 @ 11:51) >  CHEST:  LUNGS AND LARGE AIRWAYS: Patent central airways. Moderate centrilobular emphysematous changes, most prominent in the right upper lobe. Stable 4 mm pleural tag in the left lower lobe on image 89. No new discrete pulmonary nodule or confluent airspace opacity is identified.  PLEURA: Stable mild to moderate layering right pleural effusion with moderate rounded subpleural atelectasis in the right middle lobe and right lower lobe.  VESSELS: Within normal limits.  HEART: Left-sided dual-lead cardiac pacer. The heart is enlarged. No pericardial effusion.  MEDIASTINUM AND AIDA: No lymphadenopathy.  CHEST WALL AND LOWER NECK: Severe bilateral gynecomastia.    ABDOMEN AND PELVIS:  LIVER: The liver is bulbous in contour with prominence of the left lobe laterally and caudate lobe, suggestive of a cirrhotic morphology.  BILE DUCTS: Normal caliber.  GALLBLADDER: Small layering gallstones in the gallbladder.  SPLEEN: Within normal limits.  PANCREAS: Moderate atrophy and fatty replacement of the pancreas.  ADRENALS: Within normal limits.  KIDNEYS/URETERS: Within normal limits.    BLADDER: Minimally distended.  REPRODUCTIVE ORGANS: The prostate is not enlarged.    BOWEL: Scattered sigmoid diverticula. No bowel obstruction.  PERITONEUM: Moderate free fluid in the peritoneal cavity, suggestive of ascites.  VESSELS: Within normal limits.  RETROPERITONEUM/LYMPH NODES: No lymphadenopathy.  ABDOMINAL WALL: Right inguinal hernia containing peritoneal fluid.  BONES: Within normallimits.    IMPRESSION:  1. Stable mild to moderate right pleural effusion with moderate rounded atelectasis in the right middle lobe and right lower lobe.  2. Stable cardiomegaly.  3. Cirrhotic liver morphology with moderate ascites.    < end of copied text >    < from: Cardiac Cath Lab - Adult (06.22.16 @ 12:51) >  CORONARY VESSELS: The coronary circulation is right dominant.  LM:   --  LM: Angiography showed minor luminal irregularities with no flow  limiting lesions.  LAD:   --  Mid LAD: There was a 50 % stenosis.  CX:   --  Circumflex: Angiography showed minor luminal irregularities with  no flow limiting lesions.  RCA:   --  Proximal RCA: There was a 60 % stenosis.  --  Mid RCA: There was a 85 % stenosis.  COMPLICATIONS: There were no complications.  DIAGNOSTIC RECOMMENDATIONS: The patient should continue with the present  medications.  Prepared and signed by

## 2021-11-23 NOTE — DIETITIAN INITIAL EVALUATION ADULT. - ORAL INTAKE PTA/DIET HISTORY
Pt with 15# weight loss x 3 mo. Pt lives in [facility] housing. plan is [place] vs home.

## 2021-11-23 NOTE — PROGRESS NOTE ADULT - SUBJECTIVE AND OBJECTIVE BOX
Patient is a 61y old  Male who presents with a chief complaint of Aflutter, AICD firing , CHF exacerbation     pt is with c/o severe neck pain seen in am   d/w EP DR Tran   Pt is resting in the bedside , + dyspnea , on oxygen via NC     s/p VANESSA CV yesterday tolerated procedure     Vital Signs Last 24 Hrs  T(C): 36.7 (23 Nov 2021 09:15), Max: 36.7 (23 Nov 2021 09:15)  T(F): 98 (23 Nov 2021 09:15), Max: 98 (23 Nov 2021 09:15)  HR: 78 (23 Nov 2021 09:15) (54 - 131)  BP: 100/78 (23 Nov 2021 09:15) (91/73 - 118/63)  BP(mean): 82 (22 Nov 2021 19:30) (81 - 82)  RR: 18 (23 Nov 2021 09:15) (13 - 20)  SpO2: 98% (23 Nov 2021 09:15) (94% - 100%)  21 Nov 2021 07:01  -  22 Nov 2021 07:00  -  PHYSICAL EXAM:  General: awake alert , no distress  Neck: right sided neck tenderness on palpation , JVD +   Lungs: CTA bilateral , poor inspiratory effort rare rhonchi   Cardio: RRR, S1/S2, No murmur  Abdomen: Soft, Nontender, Nondistended; Bowel sounds present  Extremities: No calf tenderness, No pitting edema  Skin warm color       CAPILLARY BLOOD GLUCOSE      POCT Blood Glucose.: 135 mg/dL (23 Nov 2021 11:58)  POCT Blood Glucose.: 149 mg/dL (23 Nov 2021 08:41)  POCT Blood Glucose.: 141 mg/dL (22 Nov 2021 23:19)  POCT Blood Glucose.: 79 mg/dL (22 Nov 2021 16:14)                          15.6   8.42  )-----------( 176      ( 23 Nov 2021 01:29 )             48.0     BMP is pending

## 2021-11-23 NOTE — PROGRESS NOTE ADULT - ASSESSMENT
62yo M w/ PMHx of CAD, Nonischemic Cardiomyopathy - CHF (EF in 2016 15-20%) s/p ICD (reports placed about 5 months ago by Dr. Shaikh), nonobstructive CAD, HTN admitted after his ICD fired multiple times, worsening dyspnea due to acute on chronic systolic congestive heart failure exacerbation likely due to dietary noncompliance.  TTE with EF < 20%.      Acute Decompensated HFrEF EF <20%.   - Patient is still fluid overload now with moderate ascites, rales on exam, and JVD.   - s/p Lasix 100mg with no significant improvement in fluid status   - Continue bisoprolol and Amiodarone   - Nephrology consulted and recs appreciated   - Paracentesis for abdominal distension.   - Will add further GDMT when renal function allows.   - Advanced Heart Failure consult placed.     Atrial Flutter  - s/p VAENSSA and DCCV   - - Continue bisoprolol and Amiodarone   - EP following   - continue with Hep ggt     Obstructive CAD  - Continue aspirin, statin, and beta blocker.     DERRICK likely secondary to diuresis/ prerenal azotemia vs ATN / hypoperfusion   - patient was hypotensive yesterday and this could contribute to elevation in Cr as well   - Cr 2.18  - will follow up in AM   - continue to hold diuretics ACEI/ARBs     Aracelis Thompson D.O. FAC  Cardiology/Vascular Cardiology -[facility]   Telephone # [phone]

## 2021-11-23 NOTE — PROGRESS NOTE ADULT - SUBJECTIVE AND OBJECTIVE BOX
[place] [facility]                                                               Office: [address] [address]                                                              Telephone: [phone]. Fax:[phone]                                                                             PROGRESS NOTE  Reason for follow up: Aflutter and Acute on chronic decompensated HFpEF   Overnight: No new events.   Update:   Patient is s/p VANESSA and DCCV yesterday and noted to be in sinus rhythm   patient still noted to be volume overloaded this AM  S/P Lasix 100mg IVP (60mg and 40mg)   Patient is cocaine positive   Subjective: "  ______________________"      	  Vitals:  T(C): 35.8 (11-23-21 @ 16:39), Max: 36.7 (11-23-21 @ 09:15)  HR: 80 (11-23-21 @ 16:39) (78 - 100)  BP: 102/73 (11-23-21 @ 16:39) (91/73 - 118/63)  RR: 18 (11-23-21 @ 09:15) (13 - 18)  SpO2: 94% (11-23-21 @ 16:39) (94% - 100%)  Wt(kg): --  I&O's Summary    22 Nov 2021 07:01  -  23 Nov 2021 07:00  --------------------------------------------------------  IN: 0 mL / OUT: 500 mL / NET: -500 mL      Weight (kg): 72.6 (11-19 @ 22:21)      PHYSICAL EXAM:  Appearance: Comfortable. No acute distress  HEENT:  Head and neck: Atraumatic. Normocephalic.  Normal oral mucosa, PERRL, Neck is supple. + JVD, No carotid bruit.   Neurologic: A & O x 3, no focal deficits. EOMI.  Lymphatic: No cervical lymphadenopathy  Cardiovascular: Normal S1 S2, No murmur, rubs/gallops. No JVD, No edema  Respiratory: decreased breath sounds on the RL lung field   Gastrointestinal:  Soft, Non-tender, + BS + distention   Lower Extremities: No edema  Psychiatry: Patient is calm. No agitation. Mood & affect appropriate  Skin: No rashes/ ecchymoses/cyanosis/ulcers visualized on the face, hands or feet.      CURRENT MEDICATIONS:  aMIOdarone    Tablet 400 milliGRAM(s) Oral three times a day  bisoprolol   Tablet 5 milliGRAM(s) Oral daily    ALBUTerol    90 MICROgram(s) HFA Inhaler  albuterol/ipratropium for Nebulization  budesonide 160 MICROgram(s)/formoterol 4.5 MICROgram(s) Inhaler  tiotropium 18 MICROgram(s) Capsule  melatonin  methocarbamol  atorvastatin  dextrose 40% Gel  dextrose 50% Injectable  dextrose 50% Injectable  dextrose 50% Injectable  glucagon  Injectable  insulin lispro (ADMELOG) corrective regimen sliding scale  calcium acetate  dextrose 5%.  dextrose 5%.  heparin  Infusion.  influenza   Vaccine  magnesium sulfate  IVPB      DIAGNOSTIC TESTING:  [x ] Echocardiogram:   < from: TTE Echo Complete w/o Contrast w/ Doppler (11.20.21 @ 08:33) >    Summary:   1. Left ventricular ejection fraction, by visual estimation, is <20%.   2. Technically good study.   3. Severely enlarged left atrium.   4. Severely enlarged right atrium.   5. Severely enlarged right ventricle.   6. Severely reduced RV systolic function.   7. Moderate to severe mitral valve regurgitation.   8. Thickening of the anterior mitral valve leaflet.   9. Moderate tricuspid regurgitation.  10. Estimated pulmonary artery systolic pressure is 37.8 mmHg assuming a right atrial pressure of 8 mmHg, which is consistent with borderline pulmonary hypertension.    MD Greyson Electronically signed on 11/20/2021 at 12:51:39 PM    < end of copied text >    [ ]  Catheterization:  [ ] Stress Test:    OTHER: 	      LABS:	 	  CARDIAC MARKERS ( 21 Nov 2021 07:20 )  x     / 0.06 ng/mL / x     / x     / x      p-BNP 21 Nov 2021 07:20: x                              15.6   8.42  )-----------( 176      ( 23 Nov 2021 01:29 )             48.0     11-23    133<L>  |  93<L>  |  88.1<H>  ----------------------------<  135<H>  5.3   |  18.0<L>  |  2.18<H>    Ca    9.0      23 Nov 2021 17:42    TPro  5.6<L>  /  Alb  3.5  /  TBili  1.2  /  DBili  x   /  AST  22  /  ALT  19  /  AlkPhos  144<H>  11-22    proBNP: Serum Pro-Brain Natriuretic Peptide: 42095 pg/mL (11-18 @ 20:51)    Lipid Profile:   HgA1c:   TSH: Thyroid Stimulating Hormone, Serum: 1.67 uIU/mL        TELEMETRY: Reviewed    ECG:  Reviewed by me.

## 2021-11-23 NOTE — PROGRESS NOTE ADULT - SUBJECTIVE AND OBJECTIVE BOX
Patient seen and examined    Feels better ex pain still present R side neck  no c/o CP SOB NV or palp  no further shocks  No swelling feet    Vital Signs Last 24 Hrs  T(C): 36.7 (23 Nov 2021 09:15), Max: 36.7 (23 Nov 2021 09:15)  T(F): 98 (23 Nov 2021 09:15), Max: 98 (23 Nov 2021 09:15)  HR: 78 (23 Nov 2021 09:15) (78 - 131)  BP: 100/78 (23 Nov 2021 09:15) (91/73 - 118/63)  BP(mean): 82 (22 Nov 2021 19:30) (81 - 82)  RR: 18 (23 Nov 2021 09:15) (13 - 20)  SpO2: 98% (23 Nov 2021 09:15) (97% - 100%)    PHYSICAL EXAM    GENERAL: NAD,   EYES:  conjunctiva and sclera clear  NECK: Supple, No JVD/Bruit  NERVOUS SYSTEM:  A/O x3,   CHEST:  No rales, No rhonchi  HEART:  RRR, gr 2 murmur  ABDOMEN: Soft, NT, mod ascitis + BS+  EXTREMITIES:  No Edema;  SKIN: No rashes    22 Nov 2021 18:02    138    |  102    |  67.6   ----------------------------<  71     4.3     |  18.0   |  1.67     Ca    7.5        22 Nov 2021 18:02    TPro  5.6    /  Alb  3.5    /  TBili  1.2    /  DBili  x      /  AST  22     /  ALT  19     /  AlkPhos  144    22 Nov 2021 18:02                          15.6   8.42  )-----------( 176      ( 23 Nov 2021 01:29 )             48.0     Urine Cocaine and THC +

## 2021-11-24 DIAGNOSIS — I25.10 ATHEROSCLEROTIC HEART DISEASE OF NATIVE CORONARY ARTERY WITHOUT ANGINA PECTORIS: ICD-10-CM

## 2021-11-24 DIAGNOSIS — F14.10 COCAINE ABUSE, UNCOMPLICATED: ICD-10-CM

## 2021-11-24 DIAGNOSIS — I48.92 UNSPECIFIED ATRIAL FLUTTER: ICD-10-CM

## 2021-11-24 DIAGNOSIS — E80.6 OTHER DISORDERS OF BILIRUBIN METABOLISM: ICD-10-CM

## 2021-11-24 DIAGNOSIS — R73.09 OTHER ABNORMAL GLUCOSE: ICD-10-CM

## 2021-11-24 LAB
ALBUMIN SERPL ELPH-MCNC: 4.1 G/DL — SIGNIFICANT CHANGE UP (ref 3.3–5.2)
ALP SERPL-CCNC: 197 U/L — HIGH (ref 40–120)
ALT FLD-CCNC: 21 U/L — SIGNIFICANT CHANGE UP
ANION GAP SERPL CALC-SCNC: 16 MMOL/L — SIGNIFICANT CHANGE UP (ref 5–17)
ANION GAP SERPL CALC-SCNC: 21 MMOL/L — HIGH (ref 5–17)
APTT BLD: 79 SEC — HIGH (ref 27.5–35.5)
APTT BLD: 80.3 SEC — HIGH (ref 27.5–35.5)
AST SERPL-CCNC: 25 U/L — SIGNIFICANT CHANGE UP
BILIRUB SERPL-MCNC: 2.1 MG/DL — HIGH (ref 0.4–2)
BUN SERPL-MCNC: 95.5 MG/DL — HIGH (ref 8–20)
BUN SERPL-MCNC: 98.5 MG/DL — HIGH (ref 8–20)
CALCIUM SERPL-MCNC: 8.8 MG/DL — SIGNIFICANT CHANGE UP (ref 8.6–10.2)
CALCIUM SERPL-MCNC: 8.9 MG/DL — SIGNIFICANT CHANGE UP (ref 8.6–10.2)
CHLORIDE SERPL-SCNC: 92 MMOL/L — LOW (ref 98–107)
CHLORIDE SERPL-SCNC: 92 MMOL/L — LOW (ref 98–107)
CO2 SERPL-SCNC: 17 MMOL/L — LOW (ref 22–29)
CO2 SERPL-SCNC: 24 MMOL/L — SIGNIFICANT CHANGE UP (ref 22–29)
CREAT SERPL-MCNC: 2.57 MG/DL — HIGH (ref 0.5–1.3)
CREAT SERPL-MCNC: 2.64 MG/DL — HIGH (ref 0.5–1.3)
GLUCOSE BLDC GLUCOMTR-MCNC: 119 MG/DL — HIGH (ref 70–99)
GLUCOSE BLDC GLUCOMTR-MCNC: 141 MG/DL — HIGH (ref 70–99)
GLUCOSE BLDC GLUCOMTR-MCNC: 150 MG/DL — HIGH (ref 70–99)
GLUCOSE BLDC GLUCOMTR-MCNC: 170 MG/DL — HIGH (ref 70–99)
GLUCOSE SERPL-MCNC: 123 MG/DL — HIGH (ref 70–99)
GLUCOSE SERPL-MCNC: 142 MG/DL — HIGH (ref 70–99)
HCT VFR BLD CALC: 50 % — SIGNIFICANT CHANGE UP (ref 39–50)
HGB BLD-MCNC: 16.4 G/DL — SIGNIFICANT CHANGE UP (ref 13–17)
MCHC RBC-ENTMCNC: 29.8 PG — SIGNIFICANT CHANGE UP (ref 27–34)
MCHC RBC-ENTMCNC: 32.8 GM/DL — SIGNIFICANT CHANGE UP (ref 32–36)
MCV RBC AUTO: 90.7 FL — SIGNIFICANT CHANGE UP (ref 80–100)
PHOSPHATE SERPL-MCNC: 6.5 MG/DL — HIGH (ref 2.4–4.7)
PLATELET # BLD AUTO: 144 K/UL — LOW (ref 150–400)
POTASSIUM SERPL-MCNC: 4.5 MMOL/L — SIGNIFICANT CHANGE UP (ref 3.5–5.3)
POTASSIUM SERPL-MCNC: 5.1 MMOL/L — SIGNIFICANT CHANGE UP (ref 3.5–5.3)
POTASSIUM SERPL-SCNC: 4.5 MMOL/L — SIGNIFICANT CHANGE UP (ref 3.5–5.3)
POTASSIUM SERPL-SCNC: 5.1 MMOL/L — SIGNIFICANT CHANGE UP (ref 3.5–5.3)
PROT SERPL-MCNC: 6.9 G/DL — SIGNIFICANT CHANGE UP (ref 6.6–8.7)
RBC # BLD: 5.51 M/UL — SIGNIFICANT CHANGE UP (ref 4.2–5.8)
RBC # FLD: 15.5 % — HIGH (ref 10.3–14.5)
SODIUM SERPL-SCNC: 130 MMOL/L — LOW (ref 135–145)
SODIUM SERPL-SCNC: 132 MMOL/L — LOW (ref 135–145)
TSH SERPL-MCNC: 4.18 UIU/ML — SIGNIFICANT CHANGE UP (ref 0.27–4.2)
WBC # BLD: 6.24 K/UL — SIGNIFICANT CHANGE UP (ref 3.8–10.5)
WBC # FLD AUTO: 6.24 K/UL — SIGNIFICANT CHANGE UP (ref 3.8–10.5)

## 2021-11-24 PROCEDURE — 99232 SBSQ HOSP IP/OBS MODERATE 35: CPT

## 2021-11-24 PROCEDURE — 93282 PRGRMG EVAL IMPLANTABLE DFB: CPT | Mod: 26

## 2021-11-24 PROCEDURE — 99231 SBSQ HOSP IP/OBS SF/LOW 25: CPT

## 2021-11-24 PROCEDURE — 99223 1ST HOSP IP/OBS HIGH 75: CPT

## 2021-11-24 RX ORDER — BUMETANIDE 0.25 MG/ML
0.5 INJECTION INTRAMUSCULAR; INTRAVENOUS
Qty: 20 | Refills: 0 | Status: DISCONTINUED | OUTPATIENT
Start: 2021-11-24 | End: 2021-11-30

## 2021-11-24 RX ORDER — MILRINONE LACTATE 1 MG/ML
0.25 INJECTION, SOLUTION INTRAVENOUS
Qty: 20 | Refills: 0 | Status: DISCONTINUED | OUTPATIENT
Start: 2021-11-24 | End: 2021-11-30

## 2021-11-24 RX ADMIN — Medication 667 MILLIGRAM(S): at 08:58

## 2021-11-24 RX ADMIN — HEPARIN SODIUM 700 UNIT(S)/HR: 5000 INJECTION INTRAVENOUS; SUBCUTANEOUS at 16:14

## 2021-11-24 RX ADMIN — Medication 3 MILLILITER(S): at 04:16

## 2021-11-24 RX ADMIN — Medication 1 PATCH: at 19:00

## 2021-11-24 RX ADMIN — ATORVASTATIN CALCIUM 40 MILLIGRAM(S): 80 TABLET, FILM COATED ORAL at 21:32

## 2021-11-24 RX ADMIN — HEPARIN SODIUM 700 UNIT(S)/HR: 5000 INJECTION INTRAVENOUS; SUBCUTANEOUS at 02:19

## 2021-11-24 RX ADMIN — Medication 81 MILLIGRAM(S): at 11:12

## 2021-11-24 RX ADMIN — TRAMADOL HYDROCHLORIDE 50 MILLIGRAM(S): 50 TABLET ORAL at 11:15

## 2021-11-24 RX ADMIN — Medication 3 MILLILITER(S): at 15:49

## 2021-11-24 RX ADMIN — AMIODARONE HYDROCHLORIDE 400 MILLIGRAM(S): 400 TABLET ORAL at 05:35

## 2021-11-24 RX ADMIN — TRAMADOL HYDROCHLORIDE 50 MILLIGRAM(S): 50 TABLET ORAL at 06:35

## 2021-11-24 RX ADMIN — Medication 1 PATCH: at 11:12

## 2021-11-24 RX ADMIN — TRAMADOL HYDROCHLORIDE 50 MILLIGRAM(S): 50 TABLET ORAL at 11:11

## 2021-11-24 RX ADMIN — BUMETANIDE 2.5 MG/HR: 0.25 INJECTION INTRAMUSCULAR; INTRAVENOUS at 17:01

## 2021-11-24 RX ADMIN — Medication 3 MILLILITER(S): at 21:13

## 2021-11-24 RX ADMIN — SODIUM ZIRCONIUM CYCLOSILICATE 5 GRAM(S): 10 POWDER, FOR SUSPENSION ORAL at 17:39

## 2021-11-24 RX ADMIN — Medication 667 MILLIGRAM(S): at 11:11

## 2021-11-24 RX ADMIN — METHOCARBAMOL 500 MILLIGRAM(S): 500 TABLET, FILM COATED ORAL at 21:33

## 2021-11-24 RX ADMIN — MILRINONE LACTATE 5.45 MICROGRAM(S)/KG/MIN: 1 INJECTION, SOLUTION INTRAVENOUS at 17:01

## 2021-11-24 RX ADMIN — TRAMADOL HYDROCHLORIDE 50 MILLIGRAM(S): 50 TABLET ORAL at 05:36

## 2021-11-24 RX ADMIN — SODIUM ZIRCONIUM CYCLOSILICATE 5 GRAM(S): 10 POWDER, FOR SUSPENSION ORAL at 05:36

## 2021-11-24 RX ADMIN — METHOCARBAMOL 500 MILLIGRAM(S): 500 TABLET, FILM COATED ORAL at 05:35

## 2021-11-24 RX ADMIN — MORPHINE SULFATE 1 MILLIGRAM(S): 50 CAPSULE, EXTENDED RELEASE ORAL at 00:36

## 2021-11-24 RX ADMIN — Medication 3 MILLIGRAM(S): at 21:32

## 2021-11-24 RX ADMIN — Medication 3 MILLILITER(S): at 10:55

## 2021-11-24 RX ADMIN — BISOPROLOL FUMARATE 5 MILLIGRAM(S): 10 TABLET, FILM COATED ORAL at 05:35

## 2021-11-24 RX ADMIN — Medication 1 PATCH: at 06:50

## 2021-11-24 RX ADMIN — AMIODARONE HYDROCHLORIDE 400 MILLIGRAM(S): 400 TABLET ORAL at 21:32

## 2021-11-24 RX ADMIN — Medication 667 MILLIGRAM(S): at 17:39

## 2021-11-24 RX ADMIN — METHOCARBAMOL 500 MILLIGRAM(S): 500 TABLET, FILM COATED ORAL at 15:57

## 2021-11-24 NOTE — CONSULT NOTE ADULT - SUBJECTIVE AND OBJECTIVE BOX
HPI:    PMH:     Medications:  acetaminophen     Tablet .. 650 milliGRAM(s) Oral every 6 hours PRN  ALBUTerol    90 MICROgram(s) HFA Inhaler 2 Puff(s) Inhalation every 6 hours  albuterol/ipratropium for Nebulization 3 milliLiter(s) Nebulizer every 6 hours  aluminum hydroxide/magnesium hydroxide/simethicone Suspension 30 milliLiter(s) Oral every 4 hours PRN  aMIOdarone    Tablet 400 milliGRAM(s) Oral three times a day  aspirin enteric coated 81 milliGRAM(s) Oral daily  atorvastatin 40 milliGRAM(s) Oral at bedtime  bisoprolol   Tablet 5 milliGRAM(s) Oral daily  budesonide 160 MICROgram(s)/formoterol 4.5 MICROgram(s) Inhaler 2 Puff(s) Inhalation two times a day  calcium acetate 667 milliGRAM(s) Oral three times a day with meals  dextrose 40% Gel 15 Gram(s) Oral once  dextrose 5%. 1000 milliLiter(s) IV Continuous <Continuous>  dextrose 5%. 1000 milliLiter(s) IV Continuous <Continuous>  dextrose 50% Injectable 25 Gram(s) IV Push once  dextrose 50% Injectable 12.5 Gram(s) IV Push once  dextrose 50% Injectable 25 Gram(s) IV Push once  glucagon  Injectable 1 milliGRAM(s) IntraMuscular once  heparin   Injectable 6000 Unit(s) IV Push every 6 hours PRN  heparin   Injectable 3000 Unit(s) IV Push every 6 hours PRN  heparin  Infusion.  Unit(s)/Hr IV Continuous <Continuous>  influenza   Vaccine 0.5 milliLiter(s) IntraMuscular once  insulin lispro (ADMELOG) corrective regimen sliding scale   SubCutaneous three times a day before meals  magnesium sulfate  IVPB 2 Gram(s) IV Intermittent once  melatonin 3 milliGRAM(s) Oral at bedtime  melatonin 3 milliGRAM(s) Oral at bedtime PRN  methocarbamol 500 milliGRAM(s) Oral three times a day  nicotine -  14 mG/24Hr(s) Patch 1 patch Transdermal daily  ondansetron Injectable 4 milliGRAM(s) IV Push every 8 hours PRN  sodium zirconium cyclosilicate 5 Gram(s) Oral two times a day  tiotropium 18 MICROgram(s) Capsule 1 Capsule(s) Inhalation daily  traMADol 50 milliGRAM(s) Oral every 4 hours PRN    Vitals:  T(C): 36.4 (11-24-21 @ 04:12), Max: 36.7 (11-23-21 @ 09:15)  HR: 78 (11-24-21 @ 04:17) (78 - 90)  BP: 106/82 (11-24-21 @ 04:12) (100/78 - 106/82)  BP(mean): --  ABP: --  ABP(mean): --  RR: 18 (11-24-21 @ 04:12) (18 - 18)  SpO2: 96% (11-24-21 @ 04:17) (94% - 99%)  Wt(kg): --  CVP(cm H2O): --  CO: --  CI: --  PA: --  PA(mean): --  PCWP: --  SVR: --  PVR: --    Daily     Daily         I&O's Summary    23 Nov 2021 07:01  -  24 Nov 2021 07:00  --------------------------------------------------------  IN: 0 mL / OUT: 50 mL / NET: -50 mL        Physical Exam:  Appearance: No Acute Distress  HEENT: JVP ___ cm H2O, no HJR  Cardiovascular: RRR, Normal S1 S2, No murmurs/rubs/gallops  Respiratory: Clear to auscultation bilaterally  Gastrointestinal: Soft, Non-tender, non-distended	  Skin: no skin lesions  Neurologic: Non-focal  Extremities: No LE edema, warm and well perfused  Psychiatry: A & O x 3, Mood & affect appropriate      Labs:                        16.4   6.24  )-----------( 144      ( 24 Nov 2021 01:58 )             50.0     11-24    130<L>  |  92<L>  |  95.5<H>  ----------------------------<  123<H>  5.1   |  17.0<L>  |  2.57<H>    Ca    8.8      24 Nov 2021 01:58  Phos  6.5     11-24    TPro  6.9  /  Alb  4.1  /  TBili  2.1<H>  /  DBili  x   /  AST  25  /  ALT  21  /  AlkPhos  197<H>  11-24      PTT - ( 24 Nov 2021 01:57 )  PTT:80.3 sec      Serum Pro-Brain Natriuretic Peptide: 48274 pg/mL (11-18 @ 20:51)              TELEMETRY:    [ ] Echocardiogram:   HPI:  Chronic systolic HF ACC/AHA stage C, NICMP LVEF <20% LVIDd, CAD, s/p ICD, HTN, active tobacco use, ?COPD who p/w inappropriate shocks in setting of new onset aflutter with RVR.   Admitted to OSH with CHF exac discharged on 11/3. Unfortunately, has dietary indiscretion as he lives from shelter to shelter.       PMH:     Medications:  acetaminophen     Tablet .. 650 milliGRAM(s) Oral every 6 hours PRN  ALBUTerol    90 MICROgram(s) HFA Inhaler 2 Puff(s) Inhalation every 6 hours  albuterol/ipratropium for Nebulization 3 milliLiter(s) Nebulizer every 6 hours  aluminum hydroxide/magnesium hydroxide/simethicone Suspension 30 milliLiter(s) Oral every 4 hours PRN  aMIOdarone    Tablet 400 milliGRAM(s) Oral three times a day  aspirin enteric coated 81 milliGRAM(s) Oral daily  atorvastatin 40 milliGRAM(s) Oral at bedtime  bisoprolol   Tablet 5 milliGRAM(s) Oral daily  budesonide 160 MICROgram(s)/formoterol 4.5 MICROgram(s) Inhaler 2 Puff(s) Inhalation two times a day  calcium acetate 667 milliGRAM(s) Oral three times a day with meals  dextrose 40% Gel 15 Gram(s) Oral once  dextrose 5%. 1000 milliLiter(s) IV Continuous <Continuous>  dextrose 5%. 1000 milliLiter(s) IV Continuous <Continuous>  dextrose 50% Injectable 25 Gram(s) IV Push once  dextrose 50% Injectable 12.5 Gram(s) IV Push once  dextrose 50% Injectable 25 Gram(s) IV Push once  glucagon  Injectable 1 milliGRAM(s) IntraMuscular once  heparin   Injectable 6000 Unit(s) IV Push every 6 hours PRN  heparin   Injectable 3000 Unit(s) IV Push every 6 hours PRN  heparin  Infusion.  Unit(s)/Hr IV Continuous <Continuous>  influenza   Vaccine 0.5 milliLiter(s) IntraMuscular once  insulin lispro (ADMELOG) corrective regimen sliding scale   SubCutaneous three times a day before meals  magnesium sulfate  IVPB 2 Gram(s) IV Intermittent once  melatonin 3 milliGRAM(s) Oral at bedtime  melatonin 3 milliGRAM(s) Oral at bedtime PRN  methocarbamol 500 milliGRAM(s) Oral three times a day  nicotine -  14 mG/24Hr(s) Patch 1 patch Transdermal daily  ondansetron Injectable 4 milliGRAM(s) IV Push every 8 hours PRN  sodium zirconium cyclosilicate 5 Gram(s) Oral two times a day  tiotropium 18 MICROgram(s) Capsule 1 Capsule(s) Inhalation daily  traMADol 50 milliGRAM(s) Oral every 4 hours PRN    Vitals:  T(C): 36.4 (11-24-21 @ 04:12), Max: 36.7 (11-23-21 @ 09:15)  HR: 78 (11-24-21 @ 04:17) (78 - 90)  BP: 106/82 (11-24-21 @ 04:12) (100/78 - 106/82)  BP(mean): --  ABP: --  ABP(mean): --  RR: 18 (11-24-21 @ 04:12) (18 - 18)  SpO2: 96% (11-24-21 @ 04:17) (94% - 99%)  Wt(kg): --  CVP(cm H2O): --  CO: --  CI: --  PA: --  PA(mean): --  PCWP: --  SVR: --  PVR: --    Daily     Daily         I&O's Summary    23 Nov 2021 07:01  -  24 Nov 2021 07:00  --------------------------------------------------------  IN: 0 mL / OUT: 50 mL / NET: -50 mL        Physical Exam:  Appearance: No Acute Distress  HEENT: JVP ___ cm H2O, no HJR  Cardiovascular: RRR, Normal S1 S2, No murmurs/rubs/gallops  Respiratory: Clear to auscultation bilaterally  Gastrointestinal: Soft, Non-tender, non-distended	  Skin: no skin lesions  Neurologic: Non-focal  Extremities: No LE edema, warm and well perfused  Psychiatry: A & O x 3, Mood & affect appropriate      Labs:                        16.4   6.24  )-----------( 144      ( 24 Nov 2021 01:58 )             50.0     11-24    130<L>  |  92<L>  |  95.5<H>  ----------------------------<  123<H>  5.1   |  17.0<L>  |  2.57<H>    Ca    8.8      24 Nov 2021 01:58  Phos  6.5     11-24    TPro  6.9  /  Alb  4.1  /  TBili  2.1<H>  /  DBili  x   /  AST  25  /  ALT  21  /  AlkPhos  197<H>  11-24      PTT - ( 24 Nov 2021 01:57 )  PTT:80.3 sec      Serum Pro-Brain Natriuretic Peptide: 16722 pg/mL (11-18 @ 20:51)              TELEMETRY:    [ ] Echocardiogram:   HPI:  60 y/o with h/o chronic systolic HF ACC/AHA stage C, NICMP LVEF <20% LVIDd 6.96cm, CAD, s/p ICD, HTN, active tobacco use, ?COPD who p/w inappropriate shocks in setting of new onset aflutter with RVR. He was also found to be in anasarca with ascites. Was started on low dose diuretics with no significant diuresis and worsening creatinine. Patient states he take shis medictaions regularly. Unfortunately, he's had dietary indiscretion as he lives from shelter to shelter. This is his 3rd HF related hospitalization in the past 12 months. He was last admitted to an OSH with CHF exac in October and he was discharged on 11/3.       PMH:   As above    Medications:  acetaminophen     Tablet .. 650 milliGRAM(s) Oral every 6 hours PRN  ALBUTerol    90 MICROgram(s) HFA Inhaler 2 Puff(s) Inhalation every 6 hours  albuterol/ipratropium for Nebulization 3 milliLiter(s) Nebulizer every 6 hours  aluminum hydroxide/magnesium hydroxide/simethicone Suspension 30 milliLiter(s) Oral every 4 hours PRN  aMIOdarone    Tablet 400 milliGRAM(s) Oral three times a day  aspirin enteric coated 81 milliGRAM(s) Oral daily  atorvastatin 40 milliGRAM(s) Oral at bedtime  bisoprolol   Tablet 5 milliGRAM(s) Oral daily  budesonide 160 MICROgram(s)/formoterol 4.5 MICROgram(s) Inhaler 2 Puff(s) Inhalation two times a day  calcium acetate 667 milliGRAM(s) Oral three times a day with meals  dextrose 40% Gel 15 Gram(s) Oral once  dextrose 5%. 1000 milliLiter(s) IV Continuous <Continuous>  dextrose 5%. 1000 milliLiter(s) IV Continuous <Continuous>  dextrose 50% Injectable 25 Gram(s) IV Push once  dextrose 50% Injectable 12.5 Gram(s) IV Push once  dextrose 50% Injectable 25 Gram(s) IV Push once  glucagon  Injectable 1 milliGRAM(s) IntraMuscular once  heparin   Injectable 6000 Unit(s) IV Push every 6 hours PRN  heparin   Injectable 3000 Unit(s) IV Push every 6 hours PRN  heparin  Infusion.  Unit(s)/Hr IV Continuous <Continuous>  influenza   Vaccine 0.5 milliLiter(s) IntraMuscular once  insulin lispro (ADMELOG) corrective regimen sliding scale   SubCutaneous three times a day before meals  magnesium sulfate  IVPB 2 Gram(s) IV Intermittent once  melatonin 3 milliGRAM(s) Oral at bedtime  melatonin 3 milliGRAM(s) Oral at bedtime PRN  methocarbamol 500 milliGRAM(s) Oral three times a day  nicotine -  14 mG/24Hr(s) Patch 1 patch Transdermal daily  ondansetron Injectable 4 milliGRAM(s) IV Push every 8 hours PRN  sodium zirconium cyclosilicate 5 Gram(s) Oral two times a day  tiotropium 18 MICROgram(s) Capsule 1 Capsule(s) Inhalation daily  traMADol 50 milliGRAM(s) Oral every 4 hours PRN    Vitals:  T(C): 36.4 (11-24-21 @ 04:12), Max: 36.7 (11-23-21 @ 09:15)  HR: 78 (11-24-21 @ 04:17) (78 - 90)  BP: 106/82 (11-24-21 @ 04:12) (100/78 - 106/82)  BP(mean): --  ABP: --  ABP(mean): --  RR: 18 (11-24-21 @ 04:12) (18 - 18)  SpO2: 96% (11-24-21 @ 04:17) (94% - 99%)  Wt(kg): --  CVP(cm H2O): --  CO: --  CI: --  PA: --  PA(mean): --  PCWP: --  SVR: --  PVR: --    Daily     Daily         I&O's Summary    23 Nov 2021 07:01  -  24 Nov 2021 07:00  --------------------------------------------------------  IN: 0 mL / OUT: 50 mL / NET: -50 mL        Physical Exam:  Appearance: No Acute Distress  HEENT: JVP jaw level   Cardiovascular: RRR, Normal S1 S2, 3/6 holosyst @ apex  Respiratory: decreased breath sounds at the bases  Gastrointestinal: +ascites  Neurologic: Non-focal  Extremities: coool extremities, +2 LE edema  Psychiatry: A & O x 3, Mood & affect appropriate      Labs:                        16.4   6.24  )-----------( 144      ( 24 Nov 2021 01:58 )             50.0     11-24    130<L>  |  92<L>  |  95.5<H>  ----------------------------<  123<H>  5.1   |  17.0<L>  |  2.57<H>    Ca    8.8      24 Nov 2021 01:58  Phos  6.5     11-24    TPro  6.9  /  Alb  4.1  /  TBili  2.1<H>  /  DBili  x   /  AST  25  /  ALT  21  /  AlkPhos  197<H>  11-24      PTT - ( 24 Nov 2021 01:57 )  PTT:80.3 sec      Serum Pro-Brain Natriuretic Peptide: 88016 pg/mL (11-18 @ 20:51)

## 2021-11-24 NOTE — CONSULT NOTE ADULT - TIME BILLING
- Review of records, telemetry, vital signs and daily labs.   - General and cardiovascular physical examination.  - Generation of cardiovascular treatment plan.  - Coordination of care with primary team.

## 2021-11-24 NOTE — CONSULT NOTE ADULT - REASON FOR ADMISSION
Aflutter, AICD firing
Aflutter, ICD firing
Aflutter, AICD firing
Aflutter, AICD firing
Afib with rvr, AICD fire

## 2021-11-24 NOTE — PROCEDURE NOTE - ADDITIONAL PROCEDURE DETAILS
Pt is admitted with inappropriate shocks due to 1:1 AFL and under-sensing of the AFL, now s/p VANESSA-DCCV and amiodarone. Was programmed to single zone  BPM while in hospital to minimize risk of shocks.     Changed VT/VF to VT monitor to start from 150, VT from 188 and VF from 231 bpm  Confirmed that inappropriate shocks happened due to atrial under-sensing when in AFL. Changed discriminators to single chamber (turned SMART detection off)

## 2021-11-24 NOTE — CONSULT NOTE ADULT - CONSULT REASON
ICD shocks, atrial flutter
Shortness of breath and ICD firing
HF
CKD/DERRICK, High K
Decompensated HF

## 2021-11-24 NOTE — PROGRESS NOTE ADULT - SUBJECTIVE AND OBJECTIVE BOX
NEPHROLOGY INTERVAL HPI/OVERNIGHT EVENTS:    Examined  No distress  Denies HA CP, no SOB  events noted    MEDICATIONS  (STANDING):  ALBUTerol    90 MICROgram(s) HFA Inhaler 2 Puff(s) Inhalation every 6 hours  albuterol/ipratropium for Nebulization 3 milliLiter(s) Nebulizer every 6 hours  aMIOdarone    Tablet 400 milliGRAM(s) Oral three times a day  aspirin enteric coated 81 milliGRAM(s) Oral daily  atorvastatin 40 milliGRAM(s) Oral at bedtime  bisoprolol   Tablet 5 milliGRAM(s) Oral daily  budesonide 160 MICROgram(s)/formoterol 4.5 MICROgram(s) Inhaler 2 Puff(s) Inhalation two times a day  buMETAnide Infusion 0.5 mG/Hr (2.5 mL/Hr) IV Continuous <Continuous>  calcium acetate 667 milliGRAM(s) Oral three times a day with meals  dextrose 40% Gel 15 Gram(s) Oral once  dextrose 5%. 1000 milliLiter(s) (50 mL/Hr) IV Continuous <Continuous>  dextrose 5%. 1000 milliLiter(s) (100 mL/Hr) IV Continuous <Continuous>  dextrose 50% Injectable 25 Gram(s) IV Push once  dextrose 50% Injectable 12.5 Gram(s) IV Push once  dextrose 50% Injectable 25 Gram(s) IV Push once  glucagon  Injectable 1 milliGRAM(s) IntraMuscular once  heparin  Infusion.  Unit(s)/Hr (13 mL/Hr) IV Continuous <Continuous>  influenza   Vaccine 0.5 milliLiter(s) IntraMuscular once  insulin lispro (ADMELOG) corrective regimen sliding scale   SubCutaneous three times a day before meals  magnesium sulfate  IVPB 2 Gram(s) IV Intermittent once  melatonin 3 milliGRAM(s) Oral at bedtime  methocarbamol 500 milliGRAM(s) Oral three times a day  milrinone Infusion 0.25 MICROgram(s)/kG/Min (5.45 mL/Hr) IV Continuous <Continuous>  nicotine -  14 mG/24Hr(s) Patch 1 patch Transdermal daily  sodium zirconium cyclosilicate 5 Gram(s) Oral two times a day  tiotropium 18 MICROgram(s) Capsule 1 Capsule(s) Inhalation daily    MEDICATIONS  (PRN):  acetaminophen     Tablet .. 650 milliGRAM(s) Oral every 6 hours PRN Temp greater or equal to 38C (100.4F), Mild Pain (1 - 3)  aluminum hydroxide/magnesium hydroxide/simethicone Suspension 30 milliLiter(s) Oral every 4 hours PRN Dyspepsia  heparin   Injectable 6000 Unit(s) IV Push every 6 hours PRN For aPTT less than 40  heparin   Injectable 3000 Unit(s) IV Push every 6 hours PRN For aPTT between 40 - 57  melatonin 3 milliGRAM(s) Oral at bedtime PRN Insomnia  ondansetron Injectable 4 milliGRAM(s) IV Push every 8 hours PRN Nausea and/or Vomiting  traMADol 50 milliGRAM(s) Oral every 4 hours PRN Severe Pain (7 - 10)      Allergies    No Known Allergies    Intolerances    pork (Other)      Vital Signs Last 24 Hrs  T(C): 36.4 (2021 10:55), Max: 36.5 (2021 20:00)  T(F): 97.6 (2021 10:55), Max: 97.7 (2021 20:00)  HR: 72 (2021 10:55) (72 - 80)  BP: 103/41 (2021 10:55) (102/73 - 106/82)  BP(mean): --  RR: 18 (2021 10:55) (18 - 18)  SpO2: 99% (2021 10:55) (94% - 99%)  Daily     Daily     PHYSICAL EXAM:  GENERAL: NAD  EYES:  EOMI  NECK: Supple, No JVD/Bruit  NERVOUS SYSTEM:  A/O x3,   CHEST:  No rales, No rhonchi  HEART:  RRR, gr 2 murmur  ABDOMEN: Soft, NT, mod ascitis + BS+  EXTREMITIES:  No Edema    LABS:                        16.4   6.24  )-----------( 144      ( 2021 01:58 )             50.0     11-24    130<L>  |  92<L>  |  95.5<H>  ----------------------------<  123<H>  5.1   |  17.0<L>  |  2.57<H>    Ca    8.8      2021 01:58  Phos  6.5     11-24    TPro  6.9  /  Alb  4.1  /  TBili  2.1<H>  /  DBili  x   /  AST  25  /  ALT  21  /  AlkPhos  197<H>  11-24    PTT - ( 2021 09:24 )  PTT:79.0 sec  Urinalysis Basic - ( 2021 20:37 )    Color: Yellow / Appearance: Clear / S.020 / pH: x  Gluc: x / Ketone: Trace  / Bili: Negative / Urobili: Negative mg/dL   Blood: x / Protein: 30 mg/dL / Nitrite: Negative   Leuk Esterase: Trace / RBC: 0-2 /HPF / WBC 0-2   Sq Epi: x / Non Sq Epi: Occasional / Bacteria: Few      Phosphorus Level, Serum: 6.5 mg/dL ( @ 01:58)          RADIOLOGY & ADDITIONAL TESTS:

## 2021-11-24 NOTE — CONSULT NOTE ADULT - ASSESSMENT
Cards: Alison ([facility])    Pertinent Cardiac Studies    11/18/21 EKG Aflutter LAD. PRWP. NS T wave changes  11/20/21 LVEF <20% LVIDd 6.96cm VTI 5cm, RV severely enlarged with severely reduced systolic HF, sev biatrial enlargement, mod-sev MR, moderate TR, RVSP 38mmHg  11/22/21 VANESSA limited studye, LVEF <20%, no FADUMO thrombus  2016 [facility] LM minor irreg, mid LAD 50%, LCx minor irreg, RCA prox 60% mid 85%

## 2021-11-24 NOTE — PROVIDER CONTACT NOTE (CRITICAL VALUE NOTIFICATION) - ACTION/TREATMENT ORDERED:
Following heparin nomogram, drop heparin rate down 2cc. Infusion is now running at 7cc per hr.
Following nomogram, heparin is to be help for 1 hour and when resumed decrease the rate by 2 ml per hour. New rate will be 11 ml/hr after 1 hour of holding.

## 2021-11-24 NOTE — CONSULT NOTE ADULT - PROBLEM SELECTOR PROBLEM 1
Acute on chronic systolic congestive heart failure
Implantable cardioverter-defibrillator (ICD) discharge

## 2021-11-24 NOTE — PROGRESS NOTE ADULT - SUBJECTIVE AND OBJECTIVE BOX
Today's history: s/p VANESSA DCCV 11/22/21  - seen and examined, remains in sinus. C/O right neck and face pain as well as SOB. Still very low UOP as by pt  - Tele, no events  - started on oral amiodarone, remains on IV heparin full dose. counseled about use of amiodarone again today, discussed potential risks of neuro SEs, vision injury, thyroid injury, lung injury, skin burns,. liver injury and others.   - Urine tox screen last night positive for cocaine and THC. Cocaine was negative on 10/27. Pt has been admitted since 11/18       MEDICATIONS  (STANDING):  ALBUTerol    90 MICROgram(s) HFA Inhaler 2 Puff(s) Inhalation every 6 hours  albuterol/ipratropium for Nebulization 3 milliLiter(s) Nebulizer every 6 hours  aMIOdarone    Tablet 400 milliGRAM(s) Oral three times a day  aspirin enteric coated 81 milliGRAM(s) Oral daily  atorvastatin 40 milliGRAM(s) Oral at bedtime  bisoprolol   Tablet 5 milliGRAM(s) Oral daily  budesonide 160 MICROgram(s)/formoterol 4.5 MICROgram(s) Inhaler 2 Puff(s) Inhalation two times a day  calcium acetate 667 milliGRAM(s) Oral three times a day with meals  dextrose 40% Gel 15 Gram(s) Oral once  dextrose 5%. 1000 milliLiter(s) (50 mL/Hr) IV Continuous <Continuous>  dextrose 5%. 1000 milliLiter(s) (100 mL/Hr) IV Continuous <Continuous>  dextrose 50% Injectable 25 Gram(s) IV Push once  dextrose 50% Injectable 12.5 Gram(s) IV Push once  dextrose 50% Injectable 25 Gram(s) IV Push once  glucagon  Injectable 1 milliGRAM(s) IntraMuscular once  heparin  Infusion.  Unit(s)/Hr (13 mL/Hr) IV Continuous <Continuous>  influenza   Vaccine 0.5 milliLiter(s) IntraMuscular once  insulin lispro (ADMELOG) corrective regimen sliding scale   SubCutaneous three times a day before meals  magnesium sulfate  IVPB 2 Gram(s) IV Intermittent once  melatonin 3 milliGRAM(s) Oral at bedtime  methocarbamol 500 milliGRAM(s) Oral three times a day  nicotine -  14 mG/24Hr(s) Patch 1 patch Transdermal daily  sodium zirconium cyclosilicate 5 Gram(s) Oral two times a day  tiotropium 18 MICROgram(s) Capsule 1 Capsule(s) Inhalation daily    MEDICATIONS  (PRN):  acetaminophen     Tablet .. 650 milliGRAM(s) Oral every 6 hours PRN Temp greater or equal to 38C (100.4F), Mild Pain (1 - 3)  aluminum hydroxide/magnesium hydroxide/simethicone Suspension 30 milliLiter(s) Oral every 4 hours PRN Dyspepsia  heparin   Injectable 6000 Unit(s) IV Push every 6 hours PRN For aPTT less than 40  heparin   Injectable 3000 Unit(s) IV Push every 6 hours PRN For aPTT between 40 - 57  melatonin 3 milliGRAM(s) Oral at bedtime PRN Insomnia  ondansetron Injectable 4 milliGRAM(s) IV Push every 8 hours PRN Nausea and/or Vomiting  traMADol 50 milliGRAM(s) Oral every 4 hours PRN Severe Pain (7 - 10)        Allergies  - No Known Allergies    Intolerances  - pork (Other)    PAST MEDICAL & SURGICAL HISTORY:  Heart failure, systolic, NICM  CAD (coronary artery disease)  Hypertension  Nonischemic cardiomyopathy  AF, diagnosed this admission     No significant past surgical history      Vital Signs Last 24 Hrs  T(C): 36.4 (24 Nov 2021 04:12), Max: 36.5 (23 Nov 2021 20:00)  T(F): 97.6 (24 Nov 2021 04:12), Max: 97.7 (23 Nov 2021 20:00)  HR: 78 (24 Nov 2021 04:17) (78 - 80)  BP: 106/82 (24 Nov 2021 04:12) (102/73 - 106/82)  BP(mean): --  RR: 18 (24 Nov 2021 04:12) (18 - 18)  SpO2: 96% (24 Nov 2021 04:17) (94% - 99%)    Physical Exam:  Constitutional: NAD, AAOx3  Cardiovascular: regular pulse, + JVD   Pulmonary: decrease BS to right base, mild resp distress.     GI: distended, + ascites   Extremities: trace-1+ pitting edema to calfs    LABS:                                   16.4   6.24  )-----------( 144      ( 24 Nov 2021 01:58 )             50.0   11-24    130<L>  |  92<L>  |  95.5<H>  ----------------------------<  123<H>  5.1   |  17.0<L>  |  2.57<H>    Ca    8.8      24 Nov 2021 01:58  Phos  6.5     11-24    TPro  6.9  /  Alb  4.1  /  TBili  2.1<H>  /  DBili  x   /  AST  25  /  ALT  21  /  AlkPhos  197<H>  11-24              RADIOLOGY & ADDITIONAL TESTS:  < from: TTE Echo Complete w/o Contrast w/ Doppler (11.20.21 @ 08:33) >   1. Left ventricular ejection fraction, by visual estimation, is <20%.   2. Technically good study.   3. Severely enlarged left atrium.   4. Severely enlarged right atrium.   5. Severely enlarged right ventricle.   6. Severely reduced RV systolic function.   7. Moderate to severe mitral valve regurgitation.   8. Thickening of the anterior mitral valve leaflet.   9. Moderate tricuspid regurgitation.  10. Estimated pulmonary artery systolic pressure is 37.8 mmHg assuming a right atrial pressure of 8 mmHg, which is consistent with borderline pulmonary hypertension.    < end of copied text >    < from: CT Abdomen and Pelvis No Cont (11.22.21 @ 11:51) >  CHEST:  LUNGS AND LARGE AIRWAYS: Patent central airways. Moderate centrilobular emphysematous changes, most prominent in the right upper lobe. Stable 4 mm pleural tag in the left lower lobe on image 89. No new discrete pulmonary nodule or confluent airspace opacity is identified.  PLEURA: Stable mild to moderate layering right pleural effusion with moderate rounded subpleural atelectasis in the right middle lobe and right lower lobe.  VESSELS: Within normal limits.  HEART: Left-sided dual-lead cardiac pacer. The heart is enlarged. No pericardial effusion.  MEDIASTINUM AND AIDA: No lymphadenopathy.  CHEST WALL AND LOWER NECK: Severe bilateral gynecomastia.    ABDOMEN AND PELVIS:  LIVER: The liver is bulbous in contour with prominence of the left lobe laterally and caudate lobe, suggestive of a cirrhotic morphology.  BILE DUCTS: Normal caliber.  GALLBLADDER: Small layering gallstones in the gallbladder.  SPLEEN: Within normal limits.  PANCREAS: Moderate atrophy and fatty replacement of the pancreas.  ADRENALS: Within normal limits.  KIDNEYS/URETERS: Within normal limits.    BLADDER: Minimally distended.  REPRODUCTIVE ORGANS: The prostate is not enlarged.    BOWEL: Scattered sigmoid diverticula. No bowel obstruction.  PERITONEUM: Moderate free fluid in the peritoneal cavity, suggestive of ascites.  VESSELS: Within normal limits.  RETROPERITONEUM/LYMPH NODES: No lymphadenopathy.  ABDOMINAL WALL: Right inguinal hernia containing peritoneal fluid.  BONES: Within normallimits.    IMPRESSION:  1. Stable mild to moderate right pleural effusion with moderate rounded atelectasis in the right middle lobe and right lower lobe.  2. Stable cardiomegaly.  3. Cirrhotic liver morphology with moderate ascites.    < end of copied text >    < from: Cardiac Cath Lab - Adult (06.22.16 @ 12:51) >  CORONARY VESSELS: The coronary circulation is right dominant.  LM:   --  LM: Angiography showed minor luminal irregularities with no flow  limiting lesions.  LAD:   --  Mid LAD: There was a 50 % stenosis.  CX:   --  Circumflex: Angiography showed minor luminal irregularities with  no flow limiting lesions.  RCA:   --  Proximal RCA: There was a 60 % stenosis.  --  Mid RCA: There was a 85 % stenosis.  COMPLICATIONS: There were no complications.  DIAGNOSTIC RECOMMENDATIONS: The patient should continue with the present  medications.  Prepared and signed by

## 2021-11-24 NOTE — CONSULT NOTE ADULT - PROBLEM SELECTOR RECOMMENDATION 2
Acute decompensated HF symptoms patient still hypervolemic  - start Lasix 40 IV 2 x daily, monitor strict I's & O's and daily weights with goal to be net negative 1.5-2 liters in the next 24 hours  - low Na diet, fluid restriction 1200ml daily  - continue Losartan for now, might benefit better from HF perspective if he is able to start Entresto  - continue Coreg, and Os NC 2L for now  - monitor urine output- no need for repeat TTE
Currently SR.   Bisoprolol for rate control  Monitor arrhythmia burden while on inotropic support  AC: heparin gtt  EP is following and is planning ablation next week.

## 2021-11-24 NOTE — PROGRESS NOTE ADULT - ASSESSMENT
61M with pmh of  HTN, CAD (50%mLAD, 60%pRCA and 85% mRCA on cath 2016 - no intervention), NICM, HFrEF (15%) s/p Biotronik ICD (VDD; [place]el; 6/2021),presented to [facility] ED w/ new atrial tachycardia (atrial flutter- possibly atypical) and several inappropriate ICD shocks for AFL w/ periods of rapid conduction.     Today's history: s/p VANESSA DCCV 11/22/21  - seen and examined, remains in sinus. C/O right neck and face pain as well as SOB. Still very low UOP as by pt  - Tele, no events  - started on oral amiodarone, remains on IV heparin full dose. counseled about use of amiodarone again today, discussed potential risks of neuro SEs, vision injury, thyroid injury, lung injury, skin burns,. liver injury and others.   - Urine tox screen last night positive for cocaine and THC. Cocaine was negative on 10/27. Pt has been admitted since 11/18     1. AFL, s/p VANESSA DCCV 11/22/21, in sinus now  - c/w amiodarone 400 mg TID till 11/30/21, then 200 mg daily, will need periodic testing, and skin protection. Will provide written education material about use of amiodarone  - c/w bisoprolol 5 mg daily  - c/w full  AC, cannot stop for any reason, unless life threatening cause/need. change to NOAC prior to DC when renal function improves and stabilize Strict adherence with AC discussed in length with pt and primary team     2. HF, still in massive volume overload, will defer to primary team, cardiology and HF. Pt reports very little UOP    3. Inappropriate ICD shocks,  - will reprogram ICD today. Discussed potential risk of recurrent inappropriate shocks, hope this will be lower now we started amiodarone.     No EP intervention as an inpatient, will sign off after ICD reprogramming, call us if needed   Advised to follow up with his primary EP doctor (Dr. Shaikh) upon discharge. Possibly to benefit from AF ablation.

## 2021-11-24 NOTE — CONSULT NOTE ADULT - PROBLEM SELECTOR RECOMMENDATION 9
Admit to minitord settings, check lytes and keep K>4.0 and Mg>2.0  - check TFTs and FLP, ECG  - awaiting [employer] rep to interrogate device this morning ~7am   - bedside monitor, bedrest and Cardiology will follow in am
NICMP with biventricular involvement LVEF <20% LVIDd 6.96cm  Clinically in anasarca and low output state  Check lactic acid at next blood draw as well as iron panel  Inotropes: start milrinone 0.250 mcg/kg/min  Diuretics: start bumex gtt @ 0.5mg/h. Will uptitrate as BP allows  Check BMP Q12h while on bumex gtt. Keep K > 4 and Mg > 2  GDMT: bisoprolol 5mg daily. Will optimize as BP/kidney function allow.   Strict I/O, daily weights  Not a candidate for advanced therapies due to +cocaine use and unstable housing.

## 2021-11-24 NOTE — PROGRESS NOTE ADULT - ASSESSMENT
62yo M w/ PMHx of CAD, Nonischemic Cardiomyopathy - CHF (EF in 2016 15-20%) s/p ICD (reports placed about 5 months ago by Dr. Shaikh),and  HTN admitted after his ICD fired multiple times, worsening dyspnea ,found to have rapid atrial f;lutter  pt is seen by cardiology and EPS , treated with iv lasix , oxygen ., medication adjusted , pt is with worsening renal disease , fluid overload Rate uncontrolled , VANESSA CV performed , cont anticoagulation v heparin     1- Acute on cronic systolic heart failure   cont IV lasix , rate control  moderate ascites may benefit from paracentesis however can not stop anticoagulation per EP   will defer paracentesis for now , IR informed   cardiology / HF team follow up   Start Bumex and milrinone gtt today   f/u BMP BID     2- ARF / hyperkalemia likely secondary to cardiorenal syndrome   cr is rising likely due to losartan / diuretics   avoid ARB/ ACE inh   nephrology follow up   lokelma dextrose insulin given for high K       3 Right neck pain,CP on the right likely muscular ?   US of C doppler ordered  CT of chest result  reviewed   stable effusion   pneumonia is ruled out   4-Atrial flutter with high rate , new onset   s/p VANESSA cardioversion   NSR controlled , cont amio, bystolic , anticoagulation iv heparin     5-Acute hypoxic resp failure   likely  multifactorial CHF , A flutter , underlying lung disease ?COPD ,  active smoker   cont neb therapy s/p iv steroid   stoppd  prednisone may exacerbate CHF   cont oxygen supplement   incentive spirometry   likely  will need to go home with oxygen if no improvement     6--ICD discharge   -defibrillator discharged ~14 times at home per patient. interrogated seen by EP on admission   cont to follow   stable   -cont with tele monitor      7- CAD , non obstructive    cont aspirin statin   CP atypical resolved   troponin neg     8- - Controlled Diabetes Mellitus , new ?   hba1c 6.3   cont diet , diabetic education   ISS     9-Nicotine use disorder   -nicotine patch   -smoking cessation provided     10- Substance use   urine tox cocaine and THC +     11-Moderate protein spencer malnutrition   cont diet   cont diet      DVT ppx on iv heparin     discharge   home once improved stable

## 2021-11-24 NOTE — PROGRESS NOTE ADULT - SUBJECTIVE AND OBJECTIVE BOX
HPI  Pt is a 60yo M admitted to [facility] for ICD firing and HFr EF and Afib RVR  Pt was seen and examined at bedside. Pt states his belly is getting bigger and pee about 3-4x a day.     Vital Signs Last 24 Hrs  T(C): 36.4 (24 Nov 2021 10:55), Max: 36.5 (23 Nov 2021 20:00)  T(F): 97.6 (24 Nov 2021 10:55), Max: 97.7 (23 Nov 2021 20:00)  HR: 72 (24 Nov 2021 10:55) (72 - 80)  BP: 103/41 (24 Nov 2021 10:55) (102/73 - 106/82)  BP(mean): --  RR: 18 (24 Nov 2021 10:55) (18 - 18)  SpO2: 99% (24 Nov 2021 10:55) (94% - 99%)    I&O's Summary    23 Nov 2021 07:01  -  24 Nov 2021 07:00  --------------------------------------------------------  IN: 0 mL / OUT: 50 mL / NET: -50 mL        CAPILLARY BLOOD GLUCOSE      POCT Blood Glucose.: 150 mg/dL (24 Nov 2021 12:29)  POCT Blood Glucose.: 119 mg/dL (24 Nov 2021 08:53)  POCT Blood Glucose.: 118 mg/dL (23 Nov 2021 21:22)  POCT Blood Glucose.: 151 mg/dL (23 Nov 2021 17:40)      PHYSICAL EXAM:    Constitutional: NAD,   HEENT: PERR, EOMI, Normal Hearing, MMM  Neck: Soft and supple, No LAD, No JVD  Respiratory: Breath sounds are clear bilaterally, No wheezing, rales or rhonchi  Cardiovascular: S1 and S2,   Gastrointestinal: Bowel Sounds present, soft, nontender, distension noted   Extremities: No peripheral edema  Vascular: 2+ peripheral pulses, bilateral lower ext pitting edema noted   Neurological: A/O x 3, no focal deficits  Musculoskeletal: 5/5 strength b/l upper and lower extremities  Skin: No rashes    MEDICATIONS:  MEDICATIONS  (STANDING):  ALBUTerol    90 MICROgram(s) HFA Inhaler 2 Puff(s) Inhalation every 6 hours  albuterol/ipratropium for Nebulization 3 milliLiter(s) Nebulizer every 6 hours  aMIOdarone    Tablet 400 milliGRAM(s) Oral three times a day  aspirin enteric coated 81 milliGRAM(s) Oral daily  atorvastatin 40 milliGRAM(s) Oral at bedtime  bisoprolol   Tablet 5 milliGRAM(s) Oral daily  budesonide 160 MICROgram(s)/formoterol 4.5 MICROgram(s) Inhaler 2 Puff(s) Inhalation two times a day  buMETAnide Infusion 0.5 mG/Hr (2.5 mL/Hr) IV Continuous <Continuous>  calcium acetate 667 milliGRAM(s) Oral three times a day with meals  dextrose 40% Gel 15 Gram(s) Oral once  dextrose 5%. 1000 milliLiter(s) (50 mL/Hr) IV Continuous <Continuous>  dextrose 5%. 1000 milliLiter(s) (100 mL/Hr) IV Continuous <Continuous>  dextrose 50% Injectable 25 Gram(s) IV Push once  dextrose 50% Injectable 12.5 Gram(s) IV Push once  dextrose 50% Injectable 25 Gram(s) IV Push once  glucagon  Injectable 1 milliGRAM(s) IntraMuscular once  heparin  Infusion.  Unit(s)/Hr (13 mL/Hr) IV Continuous <Continuous>  influenza   Vaccine 0.5 milliLiter(s) IntraMuscular once  insulin lispro (ADMELOG) corrective regimen sliding scale   SubCutaneous three times a day before meals  magnesium sulfate  IVPB 2 Gram(s) IV Intermittent once  melatonin 3 milliGRAM(s) Oral at bedtime  methocarbamol 500 milliGRAM(s) Oral three times a day  milrinone Infusion 0.25 MICROgram(s)/kG/Min (5.45 mL/Hr) IV Continuous <Continuous>  nicotine -  14 mG/24Hr(s) Patch 1 patch Transdermal daily  sodium zirconium cyclosilicate 5 Gram(s) Oral two times a day  tiotropium 18 MICROgram(s) Capsule 1 Capsule(s) Inhalation daily      LABS: All Labs Reviewed:                        16.4   6.24  )-----------( 144      ( 24 Nov 2021 01:58 )             50.0     11-24    130<L>  |  92<L>  |  95.5<H>  ----------------------------<  123<H>  5.1   |  17.0<L>  |  2.57<H>    Ca    8.8      24 Nov 2021 01:58  Phos  6.5     11-24    TPro  6.9  /  Alb  4.1  /  TBili  2.1<H>  /  DBili  x   /  AST  25  /  ALT  21  /  AlkPhos  197<H>  11-24    PTT - ( 24 Nov 2021 09:24 )  PTT:79.0 sec      Blood Culture:     RADIOLOGY/EKG:    DVT PPX:    ADVANCED DIRECTIVE:    DISPOSITION:

## 2021-11-25 LAB
ANION GAP SERPL CALC-SCNC: 18 MMOL/L — HIGH (ref 5–17)
ANION GAP SERPL CALC-SCNC: 19 MMOL/L — HIGH (ref 5–17)
APTT BLD: 192.8 SEC — CRITICAL HIGH (ref 27.5–35.5)
APTT BLD: 54.9 SEC — HIGH (ref 27.5–35.5)
BUN SERPL-MCNC: 83.5 MG/DL — HIGH (ref 8–20)
BUN SERPL-MCNC: 94.1 MG/DL — HIGH (ref 8–20)
CALCIUM SERPL-MCNC: 9 MG/DL — SIGNIFICANT CHANGE UP (ref 8.6–10.2)
CALCIUM SERPL-MCNC: 9.1 MG/DL — SIGNIFICANT CHANGE UP (ref 8.6–10.2)
CHLORIDE SERPL-SCNC: 90 MMOL/L — LOW (ref 98–107)
CHLORIDE SERPL-SCNC: 92 MMOL/L — LOW (ref 98–107)
CO2 SERPL-SCNC: 25 MMOL/L — SIGNIFICANT CHANGE UP (ref 22–29)
CO2 SERPL-SCNC: 27 MMOL/L — SIGNIFICANT CHANGE UP (ref 22–29)
CREAT SERPL-MCNC: 1.91 MG/DL — HIGH (ref 0.5–1.3)
CREAT SERPL-MCNC: 2.25 MG/DL — HIGH (ref 0.5–1.3)
GLUCOSE BLDC GLUCOMTR-MCNC: 112 MG/DL — HIGH (ref 70–99)
GLUCOSE BLDC GLUCOMTR-MCNC: 99 MG/DL — SIGNIFICANT CHANGE UP (ref 70–99)
GLUCOSE SERPL-MCNC: 110 MG/DL — HIGH (ref 70–99)
GLUCOSE SERPL-MCNC: 94 MG/DL — SIGNIFICANT CHANGE UP (ref 70–99)
HCT VFR BLD CALC: 43.6 % — SIGNIFICANT CHANGE UP (ref 39–50)
HGB BLD-MCNC: 14.9 G/DL — SIGNIFICANT CHANGE UP (ref 13–17)
MAGNESIUM SERPL-MCNC: 2.4 MG/DL — SIGNIFICANT CHANGE UP (ref 1.6–2.6)
MCHC RBC-ENTMCNC: 29.9 PG — SIGNIFICANT CHANGE UP (ref 27–34)
MCHC RBC-ENTMCNC: 34.2 GM/DL — SIGNIFICANT CHANGE UP (ref 32–36)
MCV RBC AUTO: 87.6 FL — SIGNIFICANT CHANGE UP (ref 80–100)
PLATELET # BLD AUTO: 165 K/UL — SIGNIFICANT CHANGE UP (ref 150–400)
POTASSIUM SERPL-MCNC: 3.5 MMOL/L — SIGNIFICANT CHANGE UP (ref 3.5–5.3)
POTASSIUM SERPL-MCNC: 3.7 MMOL/L — SIGNIFICANT CHANGE UP (ref 3.5–5.3)
POTASSIUM SERPL-SCNC: 3.5 MMOL/L — SIGNIFICANT CHANGE UP (ref 3.5–5.3)
POTASSIUM SERPL-SCNC: 3.7 MMOL/L — SIGNIFICANT CHANGE UP (ref 3.5–5.3)
RBC # BLD: 4.98 M/UL — SIGNIFICANT CHANGE UP (ref 4.2–5.8)
RBC # FLD: 15.5 % — HIGH (ref 10.3–14.5)
SARS-COV-2 RNA SPEC QL NAA+PROBE: SIGNIFICANT CHANGE UP
SODIUM SERPL-SCNC: 135 MMOL/L — SIGNIFICANT CHANGE UP (ref 135–145)
SODIUM SERPL-SCNC: 136 MMOL/L — SIGNIFICANT CHANGE UP (ref 135–145)
WBC # BLD: 6.11 K/UL — SIGNIFICANT CHANGE UP (ref 3.8–10.5)
WBC # FLD AUTO: 6.11 K/UL — SIGNIFICANT CHANGE UP (ref 3.8–10.5)

## 2021-11-25 PROCEDURE — 99232 SBSQ HOSP IP/OBS MODERATE 35: CPT

## 2021-11-25 RX ADMIN — ATORVASTATIN CALCIUM 40 MILLIGRAM(S): 80 TABLET, FILM COATED ORAL at 21:34

## 2021-11-25 RX ADMIN — Medication 3 MILLILITER(S): at 03:31

## 2021-11-25 RX ADMIN — MILRINONE LACTATE 5.45 MICROGRAM(S)/KG/MIN: 1 INJECTION, SOLUTION INTRAVENOUS at 05:37

## 2021-11-25 RX ADMIN — Medication 3 MILLILITER(S): at 15:57

## 2021-11-25 RX ADMIN — Medication 3 MILLIGRAM(S): at 21:38

## 2021-11-25 RX ADMIN — Medication 667 MILLIGRAM(S): at 09:20

## 2021-11-25 RX ADMIN — BUMETANIDE 2.5 MG/HR: 0.25 INJECTION INTRAMUSCULAR; INTRAVENOUS at 05:37

## 2021-11-25 RX ADMIN — AMIODARONE HYDROCHLORIDE 400 MILLIGRAM(S): 400 TABLET ORAL at 05:35

## 2021-11-25 RX ADMIN — SODIUM ZIRCONIUM CYCLOSILICATE 5 GRAM(S): 10 POWDER, FOR SUSPENSION ORAL at 18:24

## 2021-11-25 RX ADMIN — MILRINONE LACTATE 5.45 MICROGRAM(S)/KG/MIN: 1 INJECTION, SOLUTION INTRAVENOUS at 11:06

## 2021-11-25 RX ADMIN — METHOCARBAMOL 500 MILLIGRAM(S): 500 TABLET, FILM COATED ORAL at 21:33

## 2021-11-25 RX ADMIN — METHOCARBAMOL 500 MILLIGRAM(S): 500 TABLET, FILM COATED ORAL at 17:32

## 2021-11-25 RX ADMIN — Medication 3 MILLILITER(S): at 21:36

## 2021-11-25 RX ADMIN — METHOCARBAMOL 500 MILLIGRAM(S): 500 TABLET, FILM COATED ORAL at 05:35

## 2021-11-25 RX ADMIN — Medication 667 MILLIGRAM(S): at 11:03

## 2021-11-25 RX ADMIN — Medication 1 PATCH: at 07:00

## 2021-11-25 RX ADMIN — HEPARIN SODIUM 0 UNIT(S)/HR: 5000 INJECTION INTRAVENOUS; SUBCUTANEOUS at 20:11

## 2021-11-25 RX ADMIN — Medication 667 MILLIGRAM(S): at 17:32

## 2021-11-25 RX ADMIN — HEPARIN SODIUM 900 UNIT(S)/HR: 5000 INJECTION INTRAVENOUS; SUBCUTANEOUS at 12:08

## 2021-11-25 RX ADMIN — BISOPROLOL FUMARATE 5 MILLIGRAM(S): 10 TABLET, FILM COATED ORAL at 05:36

## 2021-11-25 RX ADMIN — HEPARIN SODIUM 3000 UNIT(S): 5000 INJECTION INTRAVENOUS; SUBCUTANEOUS at 12:14

## 2021-11-25 RX ADMIN — Medication 81 MILLIGRAM(S): at 11:03

## 2021-11-25 RX ADMIN — SODIUM ZIRCONIUM CYCLOSILICATE 5 GRAM(S): 10 POWDER, FOR SUSPENSION ORAL at 05:36

## 2021-11-25 RX ADMIN — Medication 3 MILLILITER(S): at 10:36

## 2021-11-25 RX ADMIN — BUMETANIDE 2.5 MG/HR: 0.25 INJECTION INTRAMUSCULAR; INTRAVENOUS at 21:34

## 2021-11-25 NOTE — PROGRESS NOTE ADULT - ASSESSMENT
60yo M w/ PMHx of CAD, Nonischemic Cardiomyopathy - CHF (EF in 2016 15-20%) s/p ICD (reports placed about 5 months ago by Dr. Shaikh),and  HTN admitted after his ICD fired multiple times, worsening dyspnea ,found to have rapid atrial f;lutter  pt is seen by cardiology and EPS , treated with iv lasix , oxygen ., medication adjusted , pt is with worsening renal disease , fluid overload Rate uncontrolled , VANESSA CV performed , cont anticoagulation v heparin     1- Acute on cronic systolic heart failure   on Milrinone drip , rate controlled   moderate ascites may benefit from paracentesis however can not stop anticoagulation per EP   will defer paracentesis for now , IR informed   cardiology / HF team follow up   Start Bumex and milrinone gtt 11/24, tolerate well   f/u BMP     2- ARF / hyperkalemia likely secondary to cardiorenal syndrome   cr is rising likely due to losartan / diuretics   avoid ARB/ ACE inh   nephrology follow up   lokelma dextrose insulin given for high K   Crt 2.6, if continue to be high, might need to have diuretic break       3 Right neck pain,CP on the right likely muscular ?   US of C doppler ordered  CT of chest result  reviewed   stable effusion   pneumonia is ruled out     4-Atrial flutter with high rate , new onset   s/p VANESSA cardioversion   NSR controlled , cont amino, bystolic , anticoagulation iv heparin     5-Acute hypoxic resp failure   likely  multifactorial CHF , A flutter , underlying lung disease ?COPD ,  active smoker   cont neb therapy s/p iv steroid   stoppd  prednisone may exacerbate CHF   cont oxygen supplement   incentive spirometry   likely  will need to go home with oxygen if no improvement     6--ICD discharge   -defibrillator discharged ~14 times at home per patient. interrogated seen by EP on admission   cont to follow   stable   -cont with tele monitor      7- CAD , non obstructive    cont aspirin statin   CP atypical resolved   troponin neg     8- - Controlled Diabetes Mellitus , new ?   hba1c 6.3   cont diet , diabetic education   ISS     9-Nicotine use disorder   -nicotine patch   -smoking cessation provided     10- Substance use   urine tox cocaine and THC +     11-Moderate protein spencer malnutrition   cont diet   cont diet      DVT ppx on iv heparin   Cont Milrinone and Bumex drip

## 2021-11-25 NOTE — PROGRESS NOTE ADULT - SUBJECTIVE AND OBJECTIVE BOX
[facility]                                                               Office: [address] [address]                                                              Telephone: [phone]. Fax:[phone]                                                                             PROGRESS NOTE  Reason for follow up: Decompensated HFrEF  Update: Patient feels much better today, states his breathing has greatly improved and his neck pain is resolving. Patient negative almost 2 L from yesterday, and BUN/Cr are downtrending. Patient a little hypotensive this morning.   Covid Status: Negative 11/21/21    Review of symptoms:   Cardiac:  No chest pain. No dyspnea. No palpitations.  Respiratory: No cough. No dyspnea  Gastrointestinal: No diarrhea. No abdominal pain. No bleeding.     Past medical history: No updates.   	  Vitals:  T(C): 36.4 (11-25-21 @ 11:10), Max: 37.2 (11-24-21 @ 22:26)  HR: 78 (11-25-21 @ 11:10) (66 - 78)  BP: 103/68 (11-25-21 @ 11:10) (90/65 - 113/70)  RR: 20 (11-25-21 @ 11:10) (17 - 20)  SpO2: 96% (11-25-21 @ 11:10) (83% - 99%)  Wt(kg): --  I&O's Summary    24 Nov 2021 07:01  -  25 Nov 2021 07:00  --------------------------------------------------------  IN: 0 mL / OUT: 2675 mL / NET: -2675 mL    25 Nov 2021 07:01  -  25 Nov 2021 13:40  --------------------------------------------------------  IN: 0 mL / OUT: 600 mL / NET: -600 mL      PHYSICAL EXAM:  Appearance: Comfortable. No acute distress  HEENT:  Head and neck: Atraumatic. Normocephalic.  Normal oral mucosa, PERRL, Neck is supple. + JVD, No carotid bruit.   Neurologic: A&Ox 3, no focal deficits. EOMI, Cranial nerves are intact.  Lymphatic: No cervical lymphadenopathy  Cardiovascular: Normal S1 S2, No rubs/gallops. III/VI systolic ejection murmur lsb  Respiratory: Decreased at b/l bases, L>R  Gastrointestinal:  Soft, Non-tender, + BS, + ascite  Lower Extremities: 2+ b/l LE edema   Psychiatry: Patient is calm. No agitation. Mood & affect appropriate  Skin: No rashes/ecchymoses/cyanosis/ulcers visualized on the face, hands or feet.      CURRENT MEDICATIONS:  aMIOdarone    Tablet 400 milliGRAM(s) Oral three times a day  bisoprolol   Tablet 5 milliGRAM(s) Oral daily  buMETAnide Infusion 0.5 mG/Hr IV Continuous <Continuous>  milrinone Infusion 0.25 MICROgram(s)/kG/Min IV Continuous <Continuous>    ALBUTerol    90 MICROgram(s) HFA Inhaler  albuterol/ipratropium for Nebulization  budesonide 160 MICROgram(s)/formoterol 4.5 MICROgram(s) Inhaler  tiotropium 18 MICROgram(s) Capsule  melatonin  methocarbamol  atorvastatin  dextrose 40% Gel  dextrose 50% Injectable  dextrose 50% Injectable  dextrose 50% Injectable  glucagon  Injectable  insulin lispro (ADMELOG) corrective regimen sliding scale  aspirin enteric coated  calcium acetate  dextrose 5%.  dextrose 5%.  heparin  Infusion.  influenza   Vaccine  magnesium sulfate  IVPB      DIAGNOSTIC TESTING:  [ ] Echocardiogram:   < from: VANESSA Echo Doppler (11.22.21 @ 18:34) >  PHYSICIAN INTERPRETATION:  Left Ventricle:  Global LV systolic function was severely decreased. Left ventricular ejection fraction, by visual estimation, is <20%.  In comparison to the previous echocardiogram(s): There are no prior VANESSA studies on this patient for comparison purposes.      Summary:   1. Very limited study due to patient hemodynamic instability.   2. Left ventricular ejection fraction, by visual estimation, is <20% with dilated cardiomyopathy   3. Limited visualization of the left atrial appendage, no evidence of thrombus.    Micah Lua DO Electronically signed on 11/22/2021 at 6:58:45 PM    < end of copied text >    [ ]  Catheterization:  < from: Cardiac Cath Lab - Adult (06.22.16 @ 12:51) >  VENTRICLES: EF estimated was 15 %.  CORONARY VESSELS: The coronary circulation is right dominant.  LM:   --  LM: Angiography showed minor luminal irregularities with no flow  limiting lesions.  LAD:   --  Mid LAD: There was a 50 % stenosis.  CX:   --  Circumflex: Angiography showed minor luminal irregularities with  no flow limiting lesions.  RCA:   --  Proximal RCA: There was a 60 % stenosis.  --  Mid RCA: There was a 85 % stenosis.  COMPLICATIONS: There were no complications.  DIAGNOSTIC RECOMMENDATIONS: The patient should continue with the present  medications.  Prepared and signed by  Tahir Wilks M.D.  Signed 06/22/2016 15:44:49    < end of copied text >      OTHER: 	      LABS:	 	                            14.9   6.11  )-----------( 165      ( 25 Nov 2021 08:33 )             43.6     11-25    136  |  92<L>  |  94.1<H>  ----------------------------<  110<H>  3.7   |  25.0  |  2.25<H>    Ca    9.0      25 Nov 2021 08:33  Phos  6.5     11-24  Mg     2.4     11-25    TPro  6.9  /  Alb  4.1  /  TBili  2.1<H>  /  DBili  x   /  AST  25  /  ALT  21  /  AlkPhos  197<H>  11-24    proBNP:   Lipid Profile:   HgA1c:   TSH: Thyroid Stimulating Hormone, Serum: 4.18 uIU/mL  Thyroid Stimulating Hormone, Serum: 1.67 uIU/mL        TELEMETRY: Reviewed  SR, PVCs

## 2021-11-25 NOTE — CHART NOTE - NSCHARTNOTEFT_GEN_A_CORE
Pt on Heparin Infusion  Paused x 1 hr and restated at 8pm  PTT ordered for 2 am  Continue to monitor

## 2021-11-25 NOTE — PROGRESS NOTE ADULT - ASSESSMENT
61M with pmh of CAD NICM, HFrEF (15%) s/p AICD, HTN presented to ED c/o palpitations and defibrillator firing. Pt reports to ICD firing 15 times. Pt states he just arrived to [facility] and was outside talking to one of the guys that lives in the same house as him when he suddenly felt sharp electrical shock in his chest, this happed 14 times within 40 minutes with associated palpitations and lightheadedness. Pt stated he had AICD placed 6month ago by Dr. Shaikh at [facility] and never experiencing defibrillator firing before. Utox + Cocaine    DERRICK on CKD suspect stage III  worsening renal function Cr 2.1--> 2.5--> 2.6  Agree w bumex drip- if renal function cont to worsens may need diuretic deescalation   HyperKalemia treated medically- better  CT > Kidneys bladder ok. No hydronephrosis  Bladder scan - no retention  Continue to watch, hopefully improves off ARB    AM labs, will follow

## 2021-11-25 NOTE — PROGRESS NOTE ADULT - SUBJECTIVE AND OBJECTIVE BOX
NEPHROLOGY INTERVAL HPI/OVERNIGHT EVENTS:    Examined  No distress  denies HA CP, no SOB    MEDICATIONS  (STANDING):  ALBUTerol    90 MICROgram(s) HFA Inhaler 2 Puff(s) Inhalation every 6 hours  albuterol/ipratropium for Nebulization 3 milliLiter(s) Nebulizer every 6 hours  aMIOdarone    Tablet 400 milliGRAM(s) Oral three times a day  aspirin enteric coated 81 milliGRAM(s) Oral daily  atorvastatin 40 milliGRAM(s) Oral at bedtime  bisoprolol   Tablet 5 milliGRAM(s) Oral daily  budesonide 160 MICROgram(s)/formoterol 4.5 MICROgram(s) Inhaler 2 Puff(s) Inhalation two times a day  buMETAnide Infusion 0.5 mG/Hr (2.5 mL/Hr) IV Continuous <Continuous>  calcium acetate 667 milliGRAM(s) Oral three times a day with meals  dextrose 40% Gel 15 Gram(s) Oral once  dextrose 5%. 1000 milliLiter(s) (50 mL/Hr) IV Continuous <Continuous>  dextrose 5%. 1000 milliLiter(s) (100 mL/Hr) IV Continuous <Continuous>  dextrose 50% Injectable 25 Gram(s) IV Push once  dextrose 50% Injectable 12.5 Gram(s) IV Push once  dextrose 50% Injectable 25 Gram(s) IV Push once  glucagon  Injectable 1 milliGRAM(s) IntraMuscular once  heparin  Infusion.  Unit(s)/Hr (13 mL/Hr) IV Continuous <Continuous>  influenza   Vaccine 0.5 milliLiter(s) IntraMuscular once  insulin lispro (ADMELOG) corrective regimen sliding scale   SubCutaneous three times a day before meals  magnesium sulfate  IVPB 2 Gram(s) IV Intermittent once  melatonin 3 milliGRAM(s) Oral at bedtime  methocarbamol 500 milliGRAM(s) Oral three times a day  milrinone Infusion 0.25 MICROgram(s)/kG/Min (5.45 mL/Hr) IV Continuous <Continuous>  nicotine -  14 mG/24Hr(s) Patch 1 patch Transdermal daily  sodium zirconium cyclosilicate 5 Gram(s) Oral two times a day  tiotropium 18 MICROgram(s) Capsule 1 Capsule(s) Inhalation daily    MEDICATIONS  (PRN):  acetaminophen     Tablet .. 650 milliGRAM(s) Oral every 6 hours PRN Temp greater or equal to 38C (100.4F), Mild Pain (1 - 3)  aluminum hydroxide/magnesium hydroxide/simethicone Suspension 30 milliLiter(s) Oral every 4 hours PRN Dyspepsia  heparin   Injectable 6000 Unit(s) IV Push every 6 hours PRN For aPTT less than 40  heparin   Injectable 3000 Unit(s) IV Push every 6 hours PRN For aPTT between 40 - 57  melatonin 3 milliGRAM(s) Oral at bedtime PRN Insomnia  ondansetron Injectable 4 milliGRAM(s) IV Push every 8 hours PRN Nausea and/or Vomiting  traMADol 50 milliGRAM(s) Oral every 4 hours PRN Severe Pain (7 - 10)      Allergies    No Known Allergies    Intolerances    pork (Other)      Vital Signs Last 24 Hrs  T(C): 36.6 (25 Nov 2021 05:12), Max: 37.2 (24 Nov 2021 22:26)  T(F): 97.9 (25 Nov 2021 05:12), Max: 98.9 (24 Nov 2021 22:26)  HR: 71 (25 Nov 2021 05:12) (66 - 75)  BP: 95/57 (25 Nov 2021 05:12) (95/57 - 113/70)  BP(mean): --  RR: 18 (25 Nov 2021 05:12) (17 - 18)  SpO2: 83% (25 Nov 2021 05:12) (83% - 99%)  Daily     Daily     PHYSICAL EXAM:  GENERAL: NAD  EYES:  EOMI  NECK: Supple, No JVD/Bruit  NERVOUS SYSTEM:  A/O x3,   CHEST:  No rales, No rhonchi  HEART:  RRR, gr 2 murmur  ABDOMEN: Soft, NT, mod ascitis + BS+  EXTREMITIES:  No Edema    LABS:                        16.4   6.24  )-----------( 144      ( 24 Nov 2021 01:58 )             50.0     11-24    132<L>  |  92<L>  |  98.5<H>  ----------------------------<  142<H>  4.5   |  24.0  |  2.64<H>    Ca    8.9      24 Nov 2021 21:26  Phos  6.5     11-24    TPro  6.9  /  Alb  4.1  /  TBili  2.1<H>  /  DBili  x   /  AST  25  /  ALT  21  /  AlkPhos  197<H>  11-24    PTT - ( 24 Nov 2021 09:24 )  PTT:79.0 sec            RADIOLOGY & ADDITIONAL TESTS:

## 2021-11-25 NOTE — CHART NOTE - NSCHARTNOTEFT_GEN_A_CORE
noted that pt had positive tox screen for cocaine. He is on BB. Discussed with pt, denied any intentional use of cocaine "maybe there was some accidentally in the weed I smoked". Emphasized the importance of avoiding cocaine and potential life threatening complications if cocaine combined with BB. He is aware of this and will avoid cocaine for sure.   remains in sinus  no other active EP issues, follow up with Dr. Shaikh on discharge

## 2021-11-25 NOTE — PROGRESS NOTE ADULT - ASSESSMENT
60yo M w/ PMHx of CAD, Nonischemic Cardiomyopathy - CHF (EF in 2016 15-20%) s/p ICD (reports placed about 5 months ago by Dr. Shaikh), nonobstructive CAD, HTN admitted after his ICD fired multiple times, worsening dyspnea due to acute on chronic systolic congestive heart failure exacerbation likely due to dietary noncompliance.  TTE with EF < 20%.      Acute Decompensated HFrEF EF <20%.   - Improving.   - Continue Bumex gtt at 0.5mg/hr, but unable to increase at this time due to hypotension.   - Continue milrinone 0.25mcg/hr.   - Renal function improving.   - Check BID BMP while on aggressive diuresis.   - Continue bisoprolol and Amiodarone   - Nephrology consulted and recs appreciated   - Will add further GDMT when renal function allows.   - Advanced Heart Failure following.     Atrial Flutter  - s/p VANESSA and DCCV   - Continue bisoprolol and Amiodarone   - EP following   - Continue with Hep ggt     Obstructive CAD  - Continue aspirin, statin, and beta blocker.     Assessment and recommendations are final when note is signed by the attending.

## 2021-11-25 NOTE — PROGRESS NOTE ADULT - SUBJECTIVE AND OBJECTIVE BOX
HPI  Pt is a 62yo M admitted to [facility] for ICD firing and HFr EF and Afib RVR  Pt was seen and examined at bedside. Pt states his belly is getting slightly smaller and has been feeling better compared to yesterday     Vital Signs Last 24 Hrs  T(C): 36.4 (25 Nov 2021 11:10), Max: 37.2 (24 Nov 2021 22:26)  T(F): 97.5 (25 Nov 2021 11:10), Max: 98.9 (24 Nov 2021 22:26)  HR: 78 (25 Nov 2021 11:10) (66 - 78)  BP: 103/68 (25 Nov 2021 11:10) (90/65 - 113/70)  BP(mean): --  RR: 20 (25 Nov 2021 11:10) (17 - 20)  SpO2: 96% (25 Nov 2021 11:10) (83% - 99%)    I&O's Summary    24 Nov 2021 07:01  -  25 Nov 2021 07:00  --------------------------------------------------------  IN: 0 mL / OUT: 2675 mL / NET: -2675 mL    25 Nov 2021 07:01  -  25 Nov 2021 13:03  --------------------------------------------------------  IN: 0 mL / OUT: 600 mL / NET: -600 mL        CAPILLARY BLOOD GLUCOSE      POCT Blood Glucose.: 112 mg/dL (25 Nov 2021 08:23)  POCT Blood Glucose.: 170 mg/dL (24 Nov 2021 21:31)  POCT Blood Glucose.: 141 mg/dL (24 Nov 2021 17:33)      PHYSICAL EXAM:  Constitutional: NAD,   HEENT: PERR, EOMI, Normal Hearing, MMM  Neck: Soft and supple, No LAD, No JVD  Respiratory: Breath sounds are clear bilaterally, No wheezing, rales or rhonchi  Cardiovascular: S1 and S2,   Gastrointestinal: Bowel Sounds present, soft, nontender, distension noted   Extremities: +2 bilateral lower ext swelling noted   Vascular: 2+ peripheral pulses, bilateral lower ext pitting edema noted   Neurological: A/O x 3, no focal deficits  Musculoskeletal: 5/5 strength b/l upper and lower extremities  Skin: No rashes      MEDICATIONS:  MEDICATIONS  (STANDING):  ALBUTerol    90 MICROgram(s) HFA Inhaler 2 Puff(s) Inhalation every 6 hours  albuterol/ipratropium for Nebulization 3 milliLiter(s) Nebulizer every 6 hours  aMIOdarone    Tablet 400 milliGRAM(s) Oral three times a day  aspirin enteric coated 81 milliGRAM(s) Oral daily  atorvastatin 40 milliGRAM(s) Oral at bedtime  bisoprolol   Tablet 5 milliGRAM(s) Oral daily  budesonide 160 MICROgram(s)/formoterol 4.5 MICROgram(s) Inhaler 2 Puff(s) Inhalation two times a day  buMETAnide Infusion 0.5 mG/Hr (2.5 mL/Hr) IV Continuous <Continuous>  calcium acetate 667 milliGRAM(s) Oral three times a day with meals  dextrose 40% Gel 15 Gram(s) Oral once  dextrose 5%. 1000 milliLiter(s) (50 mL/Hr) IV Continuous <Continuous>  dextrose 5%. 1000 milliLiter(s) (100 mL/Hr) IV Continuous <Continuous>  dextrose 50% Injectable 12.5 Gram(s) IV Push once  dextrose 50% Injectable 25 Gram(s) IV Push once  dextrose 50% Injectable 25 Gram(s) IV Push once  glucagon  Injectable 1 milliGRAM(s) IntraMuscular once  heparin  Infusion.  Unit(s)/Hr (13 mL/Hr) IV Continuous <Continuous>  influenza   Vaccine 0.5 milliLiter(s) IntraMuscular once  insulin lispro (ADMELOG) corrective regimen sliding scale   SubCutaneous three times a day before meals  magnesium sulfate  IVPB 2 Gram(s) IV Intermittent once  melatonin 3 milliGRAM(s) Oral at bedtime  methocarbamol 500 milliGRAM(s) Oral three times a day  milrinone Infusion 0.25 MICROgram(s)/kG/Min (5.45 mL/Hr) IV Continuous <Continuous>  nicotine -  14 mG/24Hr(s) Patch 1 patch Transdermal daily  sodium zirconium cyclosilicate 5 Gram(s) Oral two times a day  tiotropium 18 MICROgram(s) Capsule 1 Capsule(s) Inhalation daily      LABS: All Labs Reviewed:                        14.9   6.11  )-----------( 165      ( 25 Nov 2021 08:33 )             43.6     11-25    136  |  92<L>  |  94.1<H>  ----------------------------<  110<H>  3.7   |  25.0  |  2.25<H>    Ca    9.0      25 Nov 2021 08:33  Phos  6.5     11-24  Mg     2.4     11-25    TPro  6.9  /  Alb  4.1  /  TBili  2.1<H>  /  DBili  x   /  AST  25  /  ALT  21  /  AlkPhos  197<H>  11-24    PTT - ( 25 Nov 2021 08:35 )  PTT:54.9 sec      Blood Culture:     RADIOLOGY/EKG:    DVT PPX:    ADVANCED DIRECTIVE:    DISPOSITION:

## 2021-11-25 NOTE — PROGRESS NOTE ADULT - ATTENDING COMMENTS
Patient is feeling fine. No complaints.  Patient is diuresing on current medical regimen.   Unable to increase the medication dose due low BP  Continue current medical therapy

## 2021-11-26 LAB
ANION GAP SERPL CALC-SCNC: 18 MMOL/L — HIGH (ref 5–17)
APTT BLD: 182 SEC — CRITICAL HIGH (ref 27.5–35.5)
APTT BLD: 38.4 SEC — HIGH (ref 27.5–35.5)
BUN SERPL-MCNC: 69.3 MG/DL — HIGH (ref 8–20)
CALCIUM SERPL-MCNC: 8.9 MG/DL — SIGNIFICANT CHANGE UP (ref 8.6–10.2)
CHLORIDE SERPL-SCNC: 93 MMOL/L — LOW (ref 98–107)
CO2 SERPL-SCNC: 28 MMOL/L — SIGNIFICANT CHANGE UP (ref 22–29)
CREAT SERPL-MCNC: 1.59 MG/DL — HIGH (ref 0.5–1.3)
GLUCOSE BLDC GLUCOMTR-MCNC: 100 MG/DL — HIGH (ref 70–99)
GLUCOSE BLDC GLUCOMTR-MCNC: 162 MG/DL — HIGH (ref 70–99)
GLUCOSE BLDC GLUCOMTR-MCNC: 98 MG/DL — SIGNIFICANT CHANGE UP (ref 70–99)
GLUCOSE SERPL-MCNC: 94 MG/DL — SIGNIFICANT CHANGE UP (ref 70–99)
HCT VFR BLD CALC: 46.4 % — SIGNIFICANT CHANGE UP (ref 39–50)
HCT VFR BLD CALC: 49.6 % — SIGNIFICANT CHANGE UP (ref 39–50)
HGB BLD-MCNC: 15.6 G/DL — SIGNIFICANT CHANGE UP (ref 13–17)
HGB BLD-MCNC: 16.1 G/DL — SIGNIFICANT CHANGE UP (ref 13–17)
MAGNESIUM SERPL-MCNC: 1.9 MG/DL — SIGNIFICANT CHANGE UP (ref 1.6–2.6)
MCHC RBC-ENTMCNC: 29.4 PG — SIGNIFICANT CHANGE UP (ref 27–34)
MCHC RBC-ENTMCNC: 29.5 PG — SIGNIFICANT CHANGE UP (ref 27–34)
MCHC RBC-ENTMCNC: 32.5 GM/DL — SIGNIFICANT CHANGE UP (ref 32–36)
MCHC RBC-ENTMCNC: 33.6 GM/DL — SIGNIFICANT CHANGE UP (ref 32–36)
MCV RBC AUTO: 87.4 FL — SIGNIFICANT CHANGE UP (ref 80–100)
MCV RBC AUTO: 90.8 FL — SIGNIFICANT CHANGE UP (ref 80–100)
PHOSPHATE SERPL-MCNC: 2.4 MG/DL — SIGNIFICANT CHANGE UP (ref 2.4–4.7)
PLATELET # BLD AUTO: 146 K/UL — LOW (ref 150–400)
PLATELET # BLD AUTO: SIGNIFICANT CHANGE UP K/UL (ref 150–400)
POTASSIUM SERPL-MCNC: 3 MMOL/L — LOW (ref 3.5–5.3)
POTASSIUM SERPL-SCNC: 3 MMOL/L — LOW (ref 3.5–5.3)
RBC # BLD: 5.31 M/UL — SIGNIFICANT CHANGE UP (ref 4.2–5.8)
RBC # BLD: 5.46 M/UL — SIGNIFICANT CHANGE UP (ref 4.2–5.8)
RBC # FLD: 15.3 % — HIGH (ref 10.3–14.5)
RBC # FLD: 16 % — HIGH (ref 10.3–14.5)
SODIUM SERPL-SCNC: 139 MMOL/L — SIGNIFICANT CHANGE UP (ref 135–145)
WBC # BLD: 6 K/UL — SIGNIFICANT CHANGE UP (ref 3.8–10.5)
WBC # BLD: 6 K/UL — SIGNIFICANT CHANGE UP (ref 3.8–10.5)
WBC # FLD AUTO: 6 K/UL — SIGNIFICANT CHANGE UP (ref 3.8–10.5)
WBC # FLD AUTO: 6 K/UL — SIGNIFICANT CHANGE UP (ref 3.8–10.5)

## 2021-11-26 PROCEDURE — 99233 SBSQ HOSP IP/OBS HIGH 50: CPT

## 2021-11-26 RX ORDER — HEPARIN SODIUM 5000 [USP'U]/ML
1000 INJECTION INTRAVENOUS; SUBCUTANEOUS
Qty: 25000 | Refills: 0 | Status: DISCONTINUED | OUTPATIENT
Start: 2021-11-26 | End: 2021-12-01

## 2021-11-26 RX ORDER — POTASSIUM CHLORIDE 20 MEQ
40 PACKET (EA) ORAL EVERY 4 HOURS
Refills: 0 | Status: DISCONTINUED | OUTPATIENT
Start: 2021-11-26 | End: 2021-11-26

## 2021-11-26 RX ORDER — SPIRONOLACTONE 25 MG/1
25 TABLET, FILM COATED ORAL DAILY
Refills: 0 | Status: DISCONTINUED | OUTPATIENT
Start: 2021-11-26 | End: 2021-12-02

## 2021-11-26 RX ADMIN — Medication 3 MILLILITER(S): at 21:42

## 2021-11-26 RX ADMIN — Medication 667 MILLIGRAM(S): at 13:03

## 2021-11-26 RX ADMIN — HEPARIN SODIUM 800 UNIT(S)/HR: 5000 INJECTION INTRAVENOUS; SUBCUTANEOUS at 18:15

## 2021-11-26 RX ADMIN — Medication 3 MILLILITER(S): at 03:48

## 2021-11-26 RX ADMIN — HEPARIN SODIUM 0 UNIT(S)/HR: 5000 INJECTION INTRAVENOUS; SUBCUTANEOUS at 17:15

## 2021-11-26 RX ADMIN — AMIODARONE HYDROCHLORIDE 400 MILLIGRAM(S): 400 TABLET ORAL at 05:10

## 2021-11-26 RX ADMIN — Medication 81 MILLIGRAM(S): at 10:22

## 2021-11-26 RX ADMIN — Medication 3 MILLIGRAM(S): at 22:11

## 2021-11-26 RX ADMIN — MILRINONE LACTATE 5.45 MICROGRAM(S)/KG/MIN: 1 INJECTION, SOLUTION INTRAVENOUS at 23:36

## 2021-11-26 RX ADMIN — Medication 40 MILLIEQUIVALENT(S): at 13:19

## 2021-11-26 RX ADMIN — Medication 3 MILLILITER(S): at 16:04

## 2021-11-26 RX ADMIN — Medication 3 MILLILITER(S): at 10:23

## 2021-11-26 RX ADMIN — BISOPROLOL FUMARATE 5 MILLIGRAM(S): 10 TABLET, FILM COATED ORAL at 05:09

## 2021-11-26 RX ADMIN — Medication 2: at 17:26

## 2021-11-26 RX ADMIN — Medication 667 MILLIGRAM(S): at 17:27

## 2021-11-26 RX ADMIN — SPIRONOLACTONE 25 MILLIGRAM(S): 25 TABLET, FILM COATED ORAL at 17:30

## 2021-11-26 RX ADMIN — ATORVASTATIN CALCIUM 40 MILLIGRAM(S): 80 TABLET, FILM COATED ORAL at 22:12

## 2021-11-26 RX ADMIN — METHOCARBAMOL 500 MILLIGRAM(S): 500 TABLET, FILM COATED ORAL at 05:10

## 2021-11-26 RX ADMIN — MILRINONE LACTATE 5.45 MICROGRAM(S)/KG/MIN: 1 INJECTION, SOLUTION INTRAVENOUS at 05:10

## 2021-11-26 RX ADMIN — HEPARIN SODIUM 6000 UNIT(S): 5000 INJECTION INTRAVENOUS; SUBCUTANEOUS at 07:02

## 2021-11-26 RX ADMIN — AMIODARONE HYDROCHLORIDE 400 MILLIGRAM(S): 400 TABLET ORAL at 13:03

## 2021-11-26 RX ADMIN — Medication 667 MILLIGRAM(S): at 08:59

## 2021-11-26 RX ADMIN — Medication 40 MILLIEQUIVALENT(S): at 10:22

## 2021-11-26 RX ADMIN — METHOCARBAMOL 500 MILLIGRAM(S): 500 TABLET, FILM COATED ORAL at 13:17

## 2021-11-26 RX ADMIN — HEPARIN SODIUM 1000 UNIT(S)/HR: 5000 INJECTION INTRAVENOUS; SUBCUTANEOUS at 07:09

## 2021-11-26 RX ADMIN — AMIODARONE HYDROCHLORIDE 400 MILLIGRAM(S): 400 TABLET ORAL at 22:11

## 2021-11-26 NOTE — PROGRESS NOTE ADULT - SUBJECTIVE AND OBJECTIVE BOX
Subjective:  No overnight events  S/p VANESSA/DCCV 11/22    Medications:  acetaminophen     Tablet .. 650 milliGRAM(s) Oral every 6 hours PRN  ALBUTerol    90 MICROgram(s) HFA Inhaler 2 Puff(s) Inhalation every 6 hours  albuterol/ipratropium for Nebulization 3 milliLiter(s) Nebulizer every 6 hours  aluminum hydroxide/magnesium hydroxide/simethicone Suspension 30 milliLiter(s) Oral every 4 hours PRN  aMIOdarone    Tablet 400 milliGRAM(s) Oral three times a day  aspirin enteric coated 81 milliGRAM(s) Oral daily  atorvastatin 40 milliGRAM(s) Oral at bedtime  bisoprolol   Tablet 5 milliGRAM(s) Oral daily  budesonide 160 MICROgram(s)/formoterol 4.5 MICROgram(s) Inhaler 2 Puff(s) Inhalation two times a day  buMETAnide Infusion 0.5 mG/Hr IV Continuous <Continuous>  calcium acetate 667 milliGRAM(s) Oral three times a day with meals  dextrose 40% Gel 15 Gram(s) Oral once  dextrose 5%. 1000 milliLiter(s) IV Continuous <Continuous>  dextrose 5%. 1000 milliLiter(s) IV Continuous <Continuous>  dextrose 50% Injectable 25 Gram(s) IV Push once  dextrose 50% Injectable 12.5 Gram(s) IV Push once  dextrose 50% Injectable 25 Gram(s) IV Push once  glucagon  Injectable 1 milliGRAM(s) IntraMuscular once  heparin   Injectable 6000 Unit(s) IV Push every 6 hours PRN  heparin   Injectable 3000 Unit(s) IV Push every 6 hours PRN  heparin  Infusion. 1000 Unit(s)/Hr IV Continuous <Continuous>  influenza   Vaccine 0.5 milliLiter(s) IntraMuscular once  insulin lispro (ADMELOG) corrective regimen sliding scale   SubCutaneous three times a day before meals  magnesium sulfate  IVPB 2 Gram(s) IV Intermittent once  melatonin 3 milliGRAM(s) Oral at bedtime  melatonin 3 milliGRAM(s) Oral at bedtime PRN  methocarbamol 500 milliGRAM(s) Oral three times a day  milrinone Infusion 0.25 MICROgram(s)/kG/Min IV Continuous <Continuous>  nicotine -  14 mG/24Hr(s) Patch 1 patch Transdermal daily  ondansetron Injectable 4 milliGRAM(s) IV Push every 8 hours PRN  potassium chloride    Tablet ER 40 milliEquivalent(s) Oral every 4 hours  tiotropium 18 MICROgram(s) Capsule 1 Capsule(s) Inhalation daily  traMADol 50 milliGRAM(s) Oral every 4 hours PRN    Vitals:  T(C): 36.2 (11-26-21 @ 04:49), Max: 36.6 (11-25-21 @ 21:20)  HR: 79 (11-26-21 @ 04:49) (66 - 93)  BP: 106/62 (11-26-21 @ 04:49) (103/68 - 110/80)  BP(mean): --  RR: 18 (11-26-21 @ 04:49) (18 - 20)  SpO2: 92% (11-26-21 @ 04:49) (92% - 97%)    Daily     Daily         I&O's Summary    25 Nov 2021 07:01  -  26 Nov 2021 07:00  --------------------------------------------------------  IN: 0 mL / OUT: 1800 mL / NET: -1800 mL        Physical Exam:  Appearance: No Acute Distress  HEENT: JVP 8-9cm  Cardiovascular: RRR, Normal S1 S2, 3/6 holosyst LLSB  Respiratory: Clear to auscultation bilaterally  Gastrointestinal: Soft, Non-tender, mildly distended	  Skin: no skin lesions  Neurologic: Non-focal  Extremities: +1-2 LE edema  Psychiatry: A & O x 3, Mood & affect appropriate      Labs:                        15.6   6.00  )-----------( 146      ( 26 Nov 2021 05:26 )             46.4     11-26    139  |  93<L>  |  69.3<H>  ----------------------------<  94  3.0<L>   |  28.0  |  1.59<H>    Ca    8.9      26 Nov 2021 05:26  Phos  2.4     11-26  Mg     1.9     11-26        PTT - ( 26 Nov 2021 05:26 )  PTT:38.4 sec

## 2021-11-26 NOTE — PROGRESS NOTE ADULT - ASSESSMENT
62yo M w/ PMHx of CAD, Nonischemic Cardiomyopathy - CHF (EF in 2016 15-20%) s/p ICD (reports placed about 5 months ago by Dr. Shaikh),and  HTN admitted after his ICD fired multiple times, worsening dyspnea ,found to have rapid atrial f;lutter  pt is seen by cardiology and EPS , treated with iv lasix , oxygen ., medication adjusted , pt is with worsening renal disease , fluid overload Rate uncontrolled , VANESSA CV performed , cont anticoagulation v heparin     1- Acute on cronic systolic heart failure   on Milrinone drip , rate controlled   moderate ascites may benefit from paracentesis however can not stop anticoagulation per EP   will defer paracentesis for now , IR informed   cardiology / HF team follow up   Bumex and milrinone gtt 11/24, tolerate well   f/u BMP BID f/u electrolyte   likely will need to cont Bumex and milrinone drip over the weekend  Start Spironolactone today   Likely ok to start Losartan 25mg tomorrow     2- ARF / hyperkalemia likely secondary to cardiorenal syndrome   cr is rising likely due to losartan / diuretics   nephrology follow up   crt improving       3 Right neck pain,CP on the right likely muscular ?   US of C doppler ordered  CT of chest result  reviewed   stable effusion   pneumonia is ruled out     4-Atrial flutter with high rate , new onset   s/p VANESSA cardioversion   NSR controlled , cont amino, bystolic , anticoagulation iv heparin     5-Acute hypoxic resp failure   likely  multifactorial CHF , A flutter , underlying lung disease ?COPD ,  active smoker   cont neb therapy s/p iv steroid   stoppd  prednisone may exacerbate CHF   cont oxygen supplement   incentive spirometry   likely  will need to go home with oxygen if no improvement     6--ICD discharge   -defibrillator discharged ~14 times at home per patient. interrogated seen by EP on admission   cont to follow   stable   -cont with tele monitor      7- CAD , non obstructive    cont aspirin statin   CP atypical resolved   troponin neg     8- - Controlled Diabetes Mellitus , new ?   hba1c 6.3   cont diet , diabetic education   ISS     9-Nicotine use disorder   -nicotine patch   -smoking cessation provided     10- Substance use   urine tox cocaine and THC +   pt aware not use cocaine on BB     11-Moderate protein spencer malnutrition   cont diet   cont diet      DVT ppx on iv heparin, likely transition to PO at time of discharge pending renal function   Cont Milrinone and Bumex drip over the weekend

## 2021-11-26 NOTE — CHART NOTE - NSCHARTNOTEFT_GEN_A_CORE
Pt on heparin infusion for afib. Notified by RN pt heparin infusion held by day RN x1 hr per heparin nomogram and infusion resumed at 19:00. Will order repeat aPTT for 01:00 11/27. RN to continue to monitor pt and escalate PRN.

## 2021-11-26 NOTE — PROGRESS NOTE ADULT - ASSESSMENT
DERRICK: continues to recover  Prerenal--> CM (EF < 20%) with decompensated CHF  No hydronephrosis seen on CT scan  - avoid potential nephrotoxins  - cont Bumex infusion, inotrope as per cardiology  - follow labs--> will need to tolerate a degree of azotemia

## 2021-11-26 NOTE — PROGRESS NOTE ADULT - ASSESSMENT
60 y/o with h/o chronic systolic HF ACC/AHA stage C, NICMP LVEF <20% LVIDd 6.96cm, CAD, s/p ICD, HTN, active tobacco use, ?COPD who p/w inappropriate shocks in setting of new onset aflutter with RVR. He was also found to be in anasarca with ascites. Was started on low dose diuretics with no significant diuresis and worsening creatinine. Patient states he take shis medictaions regularly. Unfortunately, he's had dietary indiscretion as he lives from shelter to shelter. This is his 3rd HF related hospitalization in the past 12 months. He was last admitted to an OSH with CHF exac in October and he was discharged on 11/3.   He was started on milrinone gtt and bumex gtt with excellent response. He also underwent successful VANESSA/DCCV on 11/22 and i on amiodarone for rhythm control. Will introduce GDMT as tolerated.     Cards: Alison ([facility])    Pertinent Cardiac Studies  11/18/21 EKG Aflutter LAD. PRWP. NS T wave changes  11/20/21 LVEF <20% LVIDd 6.96cm VTI 5cm, RV severely enlarged with severely reduced systolic HF, sev biatrial enlargement, mod-sev MR, moderate TR, RVSP 38mmHg  11/22/21 VANESSA limited studye, LVEF <20%, no FADUMO thrombus  2016 [facility] LM minor irreg, mid LAD 50%, LCx minor irreg, RCA prox 60% mid 85%

## 2021-11-26 NOTE — PROGRESS NOTE ADULT - SUBJECTIVE AND OBJECTIVE BOX
NEPHROLOGY INTERVAL HPI/OVERNIGHT EVENTS:  pt resting comfortably  no acute distress noted    MEDICATIONS  (STANDING):  ALBUTerol    90 MICROgram(s) HFA Inhaler 2 Puff(s) Inhalation every 6 hours  albuterol/ipratropium for Nebulization 3 milliLiter(s) Nebulizer every 6 hours  aMIOdarone    Tablet 400 milliGRAM(s) Oral three times a day  aspirin enteric coated 81 milliGRAM(s) Oral daily  atorvastatin 40 milliGRAM(s) Oral at bedtime  bisoprolol   Tablet 5 milliGRAM(s) Oral daily  budesonide 160 MICROgram(s)/formoterol 4.5 MICROgram(s) Inhaler 2 Puff(s) Inhalation two times a day  buMETAnide Infusion 0.5 mG/Hr (2.5 mL/Hr) IV Continuous <Continuous>  calcium acetate 667 milliGRAM(s) Oral three times a day with meals  dextrose 40% Gel 15 Gram(s) Oral once  dextrose 5%. 1000 milliLiter(s) (50 mL/Hr) IV Continuous <Continuous>  dextrose 5%. 1000 milliLiter(s) (100 mL/Hr) IV Continuous <Continuous>  dextrose 50% Injectable 25 Gram(s) IV Push once  dextrose 50% Injectable 12.5 Gram(s) IV Push once  dextrose 50% Injectable 25 Gram(s) IV Push once  glucagon  Injectable 1 milliGRAM(s) IntraMuscular once  heparin  Infusion. 1000 Unit(s)/Hr (10 mL/Hr) IV Continuous <Continuous>  influenza   Vaccine 0.5 milliLiter(s) IntraMuscular once  insulin lispro (ADMELOG) corrective regimen sliding scale   SubCutaneous three times a day before meals  magnesium sulfate  IVPB 2 Gram(s) IV Intermittent once  melatonin 3 milliGRAM(s) Oral at bedtime  methocarbamol 500 milliGRAM(s) Oral three times a day  milrinone Infusion 0.25 MICROgram(s)/kG/Min (5.45 mL/Hr) IV Continuous <Continuous>  nicotine -  14 mG/24Hr(s) Patch 1 patch Transdermal daily  potassium chloride    Tablet ER 40 milliEquivalent(s) Oral every 4 hours  tiotropium 18 MICROgram(s) Capsule 1 Capsule(s) Inhalation daily    MEDICATIONS  (PRN):  acetaminophen     Tablet .. 650 milliGRAM(s) Oral every 6 hours PRN Temp greater or equal to 38C (100.4F), Mild Pain (1 - 3)  aluminum hydroxide/magnesium hydroxide/simethicone Suspension 30 milliLiter(s) Oral every 4 hours PRN Dyspepsia  heparin   Injectable 6000 Unit(s) IV Push every 6 hours PRN For aPTT less than 40  heparin   Injectable 3000 Unit(s) IV Push every 6 hours PRN For aPTT between 40 - 57  melatonin 3 milliGRAM(s) Oral at bedtime PRN Insomnia  ondansetron Injectable 4 milliGRAM(s) IV Push every 8 hours PRN Nausea and/or Vomiting  traMADol 50 milliGRAM(s) Oral every 4 hours PRN Severe Pain (7 - 10)      Allergies    No Known Allergies        Vital Signs Last 24 Hrs  T(C): 36.2 (26 Nov 2021 04:49), Max: 36.6 (25 Nov 2021 21:20)  T(F): 97.1 (26 Nov 2021 04:49), Max: 97.8 (25 Nov 2021 21:20)  HR: 82 (26 Nov 2021 10:28) (66 - 93)  BP: 106/62 (26 Nov 2021 04:49) (106/62 - 110/80)  BP(mean): --  RR: 18 (26 Nov 2021 04:49) (18 - 18)  SpO2: 93% (26 Nov 2021 10:28) (92% - 97%)    PHYSICAL EXAM:  GENERAL: Weak, tired  EYES:  EOMI  NECK: Supple, No JVD  NERVOUS SYSTEM:  A/O x3, non focal  CHEST: Diminished BS noted  HEART:  RRR, no rub  ABDOMEN: Soft, + distension +BS  EXTREMITIES: + dependent edema    LABS:                        15.6   6.00  )-----------( 146      ( 26 Nov 2021 05:26 )             46.4     11-26    139  |  93<L>  |  69.3<H>  ----------------------------<  94  3.0<L>   |  28.0  |  1.59<H>    Creatinine, Serum: 1.91 mg/dL (11.25.21)    Ca    8.9      26 Nov 2021 05:26  Phos  2.4     11-26  Mg     1.9     11-26      PTT - ( 26 Nov 2021 05:26 )  PTT:38.4 sec    Magnesium, Serum: 1.9 mg/dL (11-26 @ 05:26)  Phosphorus Level, Serum: 2.4 mg/dL (11-26 @ 05:26)      RADIOLOGY & ADDITIONAL TESTS:  < from: CT Abdomen and Pelvis No Cont (11.22.21 @ 11:51) >     EXAM:  CT ABDOMEN AND PELVIS                         EXAM:  CT CHEST                          PROCEDURE DATE:  11/22/2021          INTERPRETATION:  CLINICAL INFORMATION: Shortness of breath, right-sided chest pain with breathing.    COMPARISON: November 23, 2016.    CONTRAST/COMPLICATIONS:  IV Contrast: NONE  Oral Contrast: NONE  Complications: None reported at time of study completion    PROCEDURE:  CT of the Chest, Abdomen and Pelvis was performed.  Sagittal and coronal reformats were performed.    FINDINGS:  CHEST:  LUNGS AND LARGE AIRWAYS: Patent central airways. Moderate centrilobular emphysematous changes, most prominent in the right upper lobe. Stable 4 mm pleural tag in the left lower lobe on image 89. No new discrete pulmonary nodule or confluent airspace opacity is identified.  PLEURA: Stable mild to moderate layering right pleural effusion with moderate rounded subpleural atelectasis in the right middle lobe and right lower lobe.  VESSELS: Within normal limits.  HEART: Left-sided dual-lead cardiac pacer. The heart is enlarged. No pericardial effusion.  MEDIASTINUM AND AIDA: No lymphadenopathy.  CHEST WALL AND LOWER NECK: Severe bilateral gynecomastia.    ABDOMEN AND PELVIS:  LIVER: The liver is bulbous in contour with prominence of the left lobe laterally and caudate lobe, suggestive of a cirrhotic morphology.  BILE DUCTS: Normal caliber.  GALLBLADDER: Small layering gallstones in the gallbladder.  SPLEEN: Within normal limits.  PANCREAS: Moderate atrophy and fatty replacement of the pancreas.  ADRENALS: Within normal limits.  KIDNEYS/URETERS: Within normal limits.    BLADDER: Minimally distended.  REPRODUCTIVE ORGANS: The prostate is not enlarged.    BOWEL: Scattered sigmoid diverticula. No bowel obstruction.  PERITONEUM: Moderate free fluid in the peritoneal cavity, suggestive of ascites.  VESSELS: Within normal limits.  RETROPERITONEUM/LYMPH NODES: No lymphadenopathy.  ABDOMINAL WALL: Right inguinal hernia containing peritoneal fluid.  BONES: Within normallimits.    IMPRESSION:  1. Stable mild to moderate right pleural effusion with moderate rounded atelectasis in the right middle lobe and right lower lobe.  2. Stable cardiomegaly.  3. Cirrhotic liver morphology with moderate ascites.    < end of copied text >   regular

## 2021-11-26 NOTE — PROGRESS NOTE ADULT - SUBJECTIVE AND OBJECTIVE BOX
HPI  Pt is a 60yo M admitted to [facility] for ICD firing and HFr EF and Afib RVR  Pt was seen and examined at bedside. Pt states he is peeing a lot, however still have distended abdomen and leg swelling.     Vital Signs Last 24 Hrs  T(C): 36.2 (26 Nov 2021 04:49), Max: 36.6 (25 Nov 2021 21:20)  T(F): 97.1 (26 Nov 2021 04:49), Max: 97.8 (25 Nov 2021 21:20)  HR: 82 (26 Nov 2021 10:28) (66 - 93)  BP: 106/62 (26 Nov 2021 04:49) (106/62 - 110/80)  BP(mean): --  RR: 18 (26 Nov 2021 04:49) (18 - 18)  SpO2: 93% (26 Nov 2021 10:28) (92% - 97%)    I&O's Summary    25 Nov 2021 07:01  -  26 Nov 2021 07:00  --------------------------------------------------------  IN: 0 mL / OUT: 1800 mL / NET: -1800 mL        CAPILLARY BLOOD GLUCOSE      POCT Blood Glucose.: 98 mg/dL (26 Nov 2021 11:25)  POCT Blood Glucose.: 100 mg/dL (26 Nov 2021 08:58)  POCT Blood Glucose.: 99 mg/dL (25 Nov 2021 17:36)      PHYSICAL EXAM:  Constitutional: NAD,   HEENT: PERR, EOMI, Normal Hearing, MMM  Neck: Soft and supple, No LAD, No JVD  Respiratory: Breath sounds are clear bilaterally, No wheezing, rales or rhonchi  Cardiovascular: S1 and S2,   Gastrointestinal: Bowel Sounds present, soft, nontender, distension noted   Extremities: +2 bilateral lower ext swelling noted   Vascular: 2+ peripheral pulses, bilateral lower ext pitting edema noted   Neurological: A/O x 3, no focal deficits  Musculoskeletal: 5/5 strength b/l upper and lower extremities  Skin: No rashes      MEDICATIONS:  MEDICATIONS  (STANDING):  ALBUTerol    90 MICROgram(s) HFA Inhaler 2 Puff(s) Inhalation every 6 hours  albuterol/ipratropium for Nebulization 3 milliLiter(s) Nebulizer every 6 hours  aMIOdarone    Tablet 400 milliGRAM(s) Oral three times a day  aspirin enteric coated 81 milliGRAM(s) Oral daily  atorvastatin 40 milliGRAM(s) Oral at bedtime  bisoprolol   Tablet 5 milliGRAM(s) Oral daily  budesonide 160 MICROgram(s)/formoterol 4.5 MICROgram(s) Inhaler 2 Puff(s) Inhalation two times a day  buMETAnide Infusion 0.5 mG/Hr (2.5 mL/Hr) IV Continuous <Continuous>  calcium acetate 667 milliGRAM(s) Oral three times a day with meals  dextrose 40% Gel 15 Gram(s) Oral once  dextrose 5%. 1000 milliLiter(s) (50 mL/Hr) IV Continuous <Continuous>  dextrose 5%. 1000 milliLiter(s) (100 mL/Hr) IV Continuous <Continuous>  dextrose 50% Injectable 25 Gram(s) IV Push once  dextrose 50% Injectable 12.5 Gram(s) IV Push once  dextrose 50% Injectable 25 Gram(s) IV Push once  glucagon  Injectable 1 milliGRAM(s) IntraMuscular once  heparin  Infusion. 1000 Unit(s)/Hr (10 mL/Hr) IV Continuous <Continuous>  influenza   Vaccine 0.5 milliLiter(s) IntraMuscular once  insulin lispro (ADMELOG) corrective regimen sliding scale   SubCutaneous three times a day before meals  magnesium sulfate  IVPB 2 Gram(s) IV Intermittent once  melatonin 3 milliGRAM(s) Oral at bedtime  methocarbamol 500 milliGRAM(s) Oral three times a day  milrinone Infusion 0.25 MICROgram(s)/kG/Min (5.45 mL/Hr) IV Continuous <Continuous>  nicotine -  14 mG/24Hr(s) Patch 1 patch Transdermal daily  potassium chloride    Tablet ER 40 milliEquivalent(s) Oral every 4 hours  tiotropium 18 MICROgram(s) Capsule 1 Capsule(s) Inhalation daily      LABS: All Labs Reviewed:                        15.6   6.00  )-----------( 146      ( 26 Nov 2021 05:26 )             46.4     11-26    139  |  93<L>  |  69.3<H>  ----------------------------<  94  3.0<L>   |  28.0  |  1.59<H>    Ca    8.9      26 Nov 2021 05:26  Phos  2.4     11-26  Mg     1.9     11-26      PTT - ( 26 Nov 2021 05:26 )  PTT:38.4 sec      Blood Culture:     RADIOLOGY/EKG:    DVT PPX:    ADVANCED DIRECTIVE:    DISPOSITION:

## 2021-11-27 LAB
ANION GAP SERPL CALC-SCNC: 14 MMOL/L — SIGNIFICANT CHANGE UP (ref 5–17)
APTT BLD: 59.4 SEC — HIGH (ref 27.5–35.5)
APTT BLD: 69.8 SEC — HIGH (ref 27.5–35.5)
BUN SERPL-MCNC: 45.6 MG/DL — HIGH (ref 8–20)
CALCIUM SERPL-MCNC: 9.2 MG/DL — SIGNIFICANT CHANGE UP (ref 8.6–10.2)
CHLORIDE SERPL-SCNC: 89 MMOL/L — LOW (ref 98–107)
CO2 SERPL-SCNC: 33 MMOL/L — HIGH (ref 22–29)
CREAT SERPL-MCNC: 1.47 MG/DL — HIGH (ref 0.5–1.3)
GLUCOSE BLDC GLUCOMTR-MCNC: 109 MG/DL — HIGH (ref 70–99)
GLUCOSE BLDC GLUCOMTR-MCNC: 118 MG/DL — HIGH (ref 70–99)
GLUCOSE BLDC GLUCOMTR-MCNC: 133 MG/DL — HIGH (ref 70–99)
GLUCOSE BLDC GLUCOMTR-MCNC: 99 MG/DL — SIGNIFICANT CHANGE UP (ref 70–99)
GLUCOSE SERPL-MCNC: 159 MG/DL — HIGH (ref 70–99)
HCT VFR BLD CALC: 47.5 % — SIGNIFICANT CHANGE UP (ref 39–50)
HGB BLD-MCNC: 15.6 G/DL — SIGNIFICANT CHANGE UP (ref 13–17)
IRON SATN MFR SERPL: 44 UG/DL — LOW (ref 59–158)
IRON SATN MFR SERPL: 9 % — LOW (ref 16–55)
MAGNESIUM SERPL-MCNC: 1.9 MG/DL — SIGNIFICANT CHANGE UP (ref 1.6–2.6)
MCHC RBC-ENTMCNC: 29.1 PG — SIGNIFICANT CHANGE UP (ref 27–34)
MCHC RBC-ENTMCNC: 32.8 GM/DL — SIGNIFICANT CHANGE UP (ref 32–36)
MCV RBC AUTO: 88.5 FL — SIGNIFICANT CHANGE UP (ref 80–100)
PHOSPHATE SERPL-MCNC: 2 MG/DL — LOW (ref 2.4–4.7)
PLATELET # BLD AUTO: 148 K/UL — LOW (ref 150–400)
POTASSIUM SERPL-MCNC: 4.3 MMOL/L — SIGNIFICANT CHANGE UP (ref 3.5–5.3)
POTASSIUM SERPL-SCNC: 4.3 MMOL/L — SIGNIFICANT CHANGE UP (ref 3.5–5.3)
RBC # BLD: 5.37 M/UL — SIGNIFICANT CHANGE UP (ref 4.2–5.8)
RBC # FLD: 15.9 % — HIGH (ref 10.3–14.5)
SODIUM SERPL-SCNC: 136 MMOL/L — SIGNIFICANT CHANGE UP (ref 135–145)
TIBC SERPL-MCNC: 473 UG/DL — HIGH (ref 220–430)
TRANSFERRIN SERPL-MCNC: 331 MG/DL — HIGH (ref 180–329)
WBC # BLD: 5.56 K/UL — SIGNIFICANT CHANGE UP (ref 3.8–10.5)
WBC # FLD AUTO: 5.56 K/UL — SIGNIFICANT CHANGE UP (ref 3.8–10.5)

## 2021-11-27 PROCEDURE — 99233 SBSQ HOSP IP/OBS HIGH 50: CPT

## 2021-11-27 RX ADMIN — Medication 3 MILLIGRAM(S): at 21:53

## 2021-11-27 RX ADMIN — Medication 3 MILLILITER(S): at 10:46

## 2021-11-27 RX ADMIN — MILRINONE LACTATE 5.45 MICROGRAM(S)/KG/MIN: 1 INJECTION, SOLUTION INTRAVENOUS at 18:03

## 2021-11-27 RX ADMIN — AMIODARONE HYDROCHLORIDE 400 MILLIGRAM(S): 400 TABLET ORAL at 06:07

## 2021-11-27 RX ADMIN — Medication 3 MILLILITER(S): at 16:41

## 2021-11-27 RX ADMIN — BUMETANIDE 2.5 MG/HR: 0.25 INJECTION INTRAMUSCULAR; INTRAVENOUS at 06:09

## 2021-11-27 RX ADMIN — Medication 81 MILLIGRAM(S): at 12:32

## 2021-11-27 RX ADMIN — BISOPROLOL FUMARATE 5 MILLIGRAM(S): 10 TABLET, FILM COATED ORAL at 06:08

## 2021-11-27 RX ADMIN — HEPARIN SODIUM 800 UNIT(S)/HR: 5000 INJECTION INTRAVENOUS; SUBCUTANEOUS at 09:39

## 2021-11-27 RX ADMIN — ATORVASTATIN CALCIUM 40 MILLIGRAM(S): 80 TABLET, FILM COATED ORAL at 21:54

## 2021-11-27 RX ADMIN — HEPARIN SODIUM 800 UNIT(S)/HR: 5000 INJECTION INTRAVENOUS; SUBCUTANEOUS at 01:32

## 2021-11-27 RX ADMIN — Medication 667 MILLIGRAM(S): at 08:53

## 2021-11-27 RX ADMIN — Medication 667 MILLIGRAM(S): at 17:15

## 2021-11-27 RX ADMIN — SPIRONOLACTONE 25 MILLIGRAM(S): 25 TABLET, FILM COATED ORAL at 06:08

## 2021-11-27 RX ADMIN — Medication 667 MILLIGRAM(S): at 12:32

## 2021-11-27 NOTE — PROGRESS NOTE ADULT - ASSESSMENT
DERRICK: improved  Prerenal--> CM (EF < 20%), decompensated CHF  No hydronephrosis seen on CT scan  - avoid potential nephrotoxins  - cont Bumex infusion, inotrope as per cardiology  - daily weights, follow labs--> will need to tolerate a degree of azotemia long term

## 2021-11-27 NOTE — PROGRESS NOTE ADULT - ATTENDING COMMENTS
pt was seen and examined. plan of care DW NP.  Pt with HFrEF, on GDMT. Add low dose losartan 12.5 mg as tolerated.   Cont with diuretics and primacor for now.  We will follow with you.

## 2021-11-27 NOTE — PROGRESS NOTE ADULT - SUBJECTIVE AND OBJECTIVE BOX
CC: Jhonny, DAVEYD firing (27 Nov 2021 06:36)    INTERVAL HPI/OVERNIGHT EVENTS:  no acute events overnight  without acute complaints    Vital Signs Last 24 Hrs  T(C): 36.8 (27 Nov 2021 13:00), Max: 37.2 (27 Nov 2021 04:28)  T(F): 98.2 (27 Nov 2021 13:00), Max: 98.9 (27 Nov 2021 04:28)  HR: 68 (27 Nov 2021 13:00) (68 - 85)  BP: 96/68 (27 Nov 2021 13:00) (96/68 - 116/81)  BP(mean): --  RR: 18 (27 Nov 2021 13:00) (18 - 18)  SpO2: 97% (27 Nov 2021 13:00) (91% - 99%)    PHYSICAL EXAM:  General: in no acute distress  Eyes: PERRLA, EOMI; conjunctiva and sclera clear  Head: Normocephalic; atraumatic  ENMT: No nasal discharge; airway clear  Neck: Supple; non tender; no masses  Respiratory: No wheezes, rales or rhonchi  Cardiovascular: Regular rate and rhythm. S1 and S2 Normal; No murmurs, gallops or rubs  Gastrointestinal: Soft non-tender non-distended; Normal bowel sounds  Genitourinary: No costovertebral angle tenderness  Extremities: bilateral pitting edema to the shins  Vascular: Peripheral pulses palpable 2+ bilaterally  Neurological: Alert and oriented x4  Skin: Warm and dry. No acute rash  Psychiatric: Cooperative and appropriate    I&O's Detail    26 Nov 2021 07:01  -  27 Nov 2021 07:00  --------------------------------------------------------  IN:  Total IN: 0 mL    OUT:    Voided (mL): 3350 mL  Total OUT: 3350 mL    Total NET: -3350 mL               15.6   5.56  )-----------( 148      ( 27 Nov 2021 08:26 )             47.5     26 Nov 2021 05:26    139    |  93     |  69.3   ----------------------------<  94     3.0     |  28.0   |  1.59     Ca    8.9        26 Nov 2021 05:26  Phos  2.0       27 Nov 2021 08:26  Mg     1.9       27 Nov 2021 08:26    PTT - ( 27 Nov 2021 08:26 )  PTT:69.8 sec    CAPILLARY BLOOD GLUCOSE  POCT Blood Glucose.: 118 mg/dL (27 Nov 2021 12:31)  POCT Blood Glucose.: 99 mg/dL (27 Nov 2021 08:51)  POCT Blood Glucose.: 162 mg/dL (26 Nov 2021 16:29)    MEDICATIONS  (STANDING):  ALBUTerol    90 MICROgram(s) HFA Inhaler 2 Puff(s) Inhalation every 6 hours  albuterol/ipratropium for Nebulization 3 milliLiter(s) Nebulizer every 6 hours  aMIOdarone    Tablet 400 milliGRAM(s) Oral three times a day  aspirin enteric coated 81 milliGRAM(s) Oral daily  atorvastatin 40 milliGRAM(s) Oral at bedtime  bisoprolol   Tablet 5 milliGRAM(s) Oral daily  budesonide 160 MICROgram(s)/formoterol 4.5 MICROgram(s) Inhaler 2 Puff(s) Inhalation two times a day  buMETAnide Infusion 0.5 mG/Hr (2.5 mL/Hr) IV Continuous <Continuous>  calcium acetate 667 milliGRAM(s) Oral three times a day with meals  dextrose 40% Gel 15 Gram(s) Oral once  dextrose 5%. 1000 milliLiter(s) (50 mL/Hr) IV Continuous <Continuous>  dextrose 5%. 1000 milliLiter(s) (100 mL/Hr) IV Continuous <Continuous>  dextrose 50% Injectable 25 Gram(s) IV Push once  dextrose 50% Injectable 12.5 Gram(s) IV Push once  dextrose 50% Injectable 25 Gram(s) IV Push once  glucagon  Injectable 1 milliGRAM(s) IntraMuscular once  heparin  Infusion. 1000 Unit(s)/Hr (10 mL/Hr) IV Continuous <Continuous>  influenza   Vaccine 0.5 milliLiter(s) IntraMuscular once  insulin lispro (ADMELOG) corrective regimen sliding scale   SubCutaneous three times a day before meals  magnesium sulfate  IVPB 2 Gram(s) IV Intermittent once  melatonin 3 milliGRAM(s) Oral at bedtime  milrinone Infusion 0.25 MICROgram(s)/kG/Min (5.45 mL/Hr) IV Continuous <Continuous>  nicotine -  14 mG/24Hr(s) Patch 1 patch Transdermal daily  spironolactone 25 milliGRAM(s) Oral daily  tiotropium 18 MICROgram(s) Capsule 1 Capsule(s) Inhalation daily    MEDICATIONS  (PRN):  acetaminophen     Tablet .. 650 milliGRAM(s) Oral every 6 hours PRN Temp greater or equal to 38C (100.4F), Mild Pain (1 - 3)  aluminum hydroxide/magnesium hydroxide/simethicone Suspension 30 milliLiter(s) Oral every 4 hours PRN Dyspepsia  heparin   Injectable 6000 Unit(s) IV Push every 6 hours PRN For aPTT less than 40  heparin   Injectable 3000 Unit(s) IV Push every 6 hours PRN For aPTT between 40 - 57  melatonin 3 milliGRAM(s) Oral at bedtime PRN Insomnia  ondansetron Injectable 4 milliGRAM(s) IV Push every 8 hours PRN Nausea and/or Vomiting  traMADol 50 milliGRAM(s) Oral every 4 hours PRN Severe Pain (7 - 10)    RADIOLOGY & ADDITIONAL TESTS:

## 2021-11-27 NOTE — PROGRESS NOTE ADULT - SUBJECTIVE AND OBJECTIVE BOX
NEPHROLOGY INTERVAL HPI/OVERNIGHT EVENTS:  pt clinically stable  no adverse overnight events    MEDICATIONS  (STANDING):  ALBUTerol    90 MICROgram(s) HFA Inhaler 2 Puff(s) Inhalation every 6 hours  albuterol/ipratropium for Nebulization 3 milliLiter(s) Nebulizer every 6 hours  aMIOdarone    Tablet 400 milliGRAM(s) Oral three times a day  aspirin enteric coated 81 milliGRAM(s) Oral daily  atorvastatin 40 milliGRAM(s) Oral at bedtime  bisoprolol   Tablet 5 milliGRAM(s) Oral daily  budesonide 160 MICROgram(s)/formoterol 4.5 MICROgram(s) Inhaler 2 Puff(s) Inhalation two times a day  buMETAnide Infusion 0.5 mG/Hr (2.5 mL/Hr) IV Continuous <Continuous>  calcium acetate 667 milliGRAM(s) Oral three times a day with meals  dextrose 40% Gel 15 Gram(s) Oral once  dextrose 5%. 1000 milliLiter(s) (50 mL/Hr) IV Continuous <Continuous>  dextrose 5%. 1000 milliLiter(s) (100 mL/Hr) IV Continuous <Continuous>  dextrose 50% Injectable 25 Gram(s) IV Push once  dextrose 50% Injectable 12.5 Gram(s) IV Push once  dextrose 50% Injectable 25 Gram(s) IV Push once  glucagon  Injectable 1 milliGRAM(s) IntraMuscular once  heparin  Infusion. 1000 Unit(s)/Hr (10 mL/Hr) IV Continuous <Continuous>  influenza   Vaccine 0.5 milliLiter(s) IntraMuscular once  insulin lispro (ADMELOG) corrective regimen sliding scale   SubCutaneous three times a day before meals  magnesium sulfate  IVPB 2 Gram(s) IV Intermittent once  melatonin 3 milliGRAM(s) Oral at bedtime  milrinone Infusion 0.25 MICROgram(s)/kG/Min (5.45 mL/Hr) IV Continuous <Continuous>  nicotine -  14 mG/24Hr(s) Patch 1 patch Transdermal daily  spironolactone 25 milliGRAM(s) Oral daily  tiotropium 18 MICROgram(s) Capsule 1 Capsule(s) Inhalation daily    MEDICATIONS  (PRN):  acetaminophen     Tablet .. 650 milliGRAM(s) Oral every 6 hours PRN Temp greater or equal to 38C (100.4F), Mild Pain (1 - 3)  aluminum hydroxide/magnesium hydroxide/simethicone Suspension 30 milliLiter(s) Oral every 4 hours PRN Dyspepsia  heparin   Injectable 6000 Unit(s) IV Push every 6 hours PRN For aPTT less than 40  heparin   Injectable 3000 Unit(s) IV Push every 6 hours PRN For aPTT between 40 - 57  melatonin 3 milliGRAM(s) Oral at bedtime PRN Insomnia  ondansetron Injectable 4 milliGRAM(s) IV Push every 8 hours PRN Nausea and/or Vomiting  traMADol 50 milliGRAM(s) Oral every 4 hours PRN Severe Pain (7 - 10)      Allergies    No Known Allergies    Intolerances    pork (Other)        Vital Signs Last 24 Hrs  T(C): 37.2 (27 Nov 2021 04:28), Max: 37.2 (27 Nov 2021 04:28)  T(F): 98.9 (27 Nov 2021 04:28), Max: 98.9 (27 Nov 2021 04:28)  HR: 70 (27 Nov 2021 04:28) (70 - 85)  BP: 100/60 (27 Nov 2021 04:28) (96/61 - 116/81)  BP(mean): --  RR: 18 (27 Nov 2021 04:28) (18 - 18)  SpO2: 99% (27 Nov 2021 04:28) (91% - 99%)    PHYSICAL EXAM:  GENERAL: Debilitated  NECK: Supple, No JVD  NERVOUS SYSTEM:  A/O x3, non focal  CHEST: Diminished BS noted  HEART:  RRR, no rub  ABDOMEN: Soft, + distension +BS  EXTREMITIES: + dependent edema    LABS:                        16.1   6.00  )-----------( CLUMPED    ( 26 Nov 2021 13:57 )             49.6     11-26    139  |  93<L>  |  69.3<H>  ----------------------------<  94  3.0<L>   |  28.0  |  1.59<H>    Ca    8.9      26 Nov 2021 05:26  Phos  2.4     11-26  Mg     1.9     11-26      PTT - ( 27 Nov 2021 00:01 )  PTT:59.4 sec        RADIOLOGY & ADDITIONAL TESTS:  < from: CT Abdomen and Pelvis No Cont (11.22.21 @ 11:51) >     EXAM:  CT ABDOMEN AND PELVIS                         EXAM:  CT CHEST                          PROCEDURE DATE:  11/22/2021          INTERPRETATION:  CLINICAL INFORMATION: Shortness of breath, right-sided chest pain with breathing.    COMPARISON: November 23, 2016.    CONTRAST/COMPLICATIONS:  IV Contrast: NONE  Oral Contrast: NONE  Complications: None reported at time of study completion    PROCEDURE:  CT of the Chest, Abdomen and Pelvis was performed.  Sagittal and coronal reformats were performed.    FINDINGS:  CHEST:  LUNGS AND LARGE AIRWAYS: Patent central airways. Moderate centrilobular emphysematous changes, most prominent in the right upper lobe. Stable 4 mm pleural tag in the left lower lobe on image 89. No new discrete pulmonary nodule or confluent airspace opacity is identified.  PLEURA: Stable mild to moderate layering right pleural effusion with moderate rounded subpleural atelectasis in the right middle lobe and right lower lobe.  VESSELS: Within normal limits.  HEART: Left-sided dual-lead cardiac pacer. The heart is enlarged. No pericardial effusion.  MEDIASTINUM AND AIDA: No lymphadenopathy.  CHEST WALL AND LOWER NECK: Severe bilateral gynecomastia.    ABDOMEN AND PELVIS:  LIVER: The liver is bulbous in contour with prominence of the left lobe laterally and caudate lobe, suggestive of a cirrhotic morphology.  BILE DUCTS: Normal caliber.  GALLBLADDER: Small layering gallstones in the gallbladder.  SPLEEN: Within normal limits.  PANCREAS: Moderate atrophy and fatty replacement of the pancreas.  ADRENALS: Within normal limits.  KIDNEYS/URETERS: Within normal limits.    BLADDER: Minimally distended.  REPRODUCTIVE ORGANS: The prostate is not enlarged.    BOWEL: Scattered sigmoid diverticula. No bowel obstruction.  PERITONEUM: Moderate free fluid in the peritoneal cavity, suggestive of ascites.  VESSELS: Within normal limits.  RETROPERITONEUM/LYMPH NODES: No lymphadenopathy.  ABDOMINAL WALL: Right inguinal hernia containing peritoneal fluid.  BONES: Within normallimits.    IMPRESSION:  1. Stable mild to moderate right pleural effusion with moderate rounded atelectasis in the right middle lobe and right lower lobe.  2. Stable cardiomegaly.  3. Cirrhotic liver morphology with moderate ascites.    < end of copied text >

## 2021-11-27 NOTE — PROGRESS NOTE ADULT - SUBJECTIVE AND OBJECTIVE BOX
[place] [facility]                                                        Office: [address] [address]                                                       Telephone: [phone]. Fax:[phone]                                                                             PROGRESS NOTE   Reason for follow up:    Aflutter, AICD firing                            Overnight: No new events.   Update:   Sitting up in bed, Bumex heparin and milrinone infusing.  Telemetry continues in SR with PVC's  Subjective: "Nothing"   Complains of:  Nothing  Review of symptoms: Cardiac:  No chest pain. No dyspnea. No palpitations.  Respiratory: no cough. No dyspnea  Gastrointestinal: No diarrhea. No abdominal pain. No bleeding.     Past medical history: No updates.   Chronic conditions:  PAST MEDICAL & SURGICAL HISTORY:  Heart failure, systolic  CAD (coronary artery disease)  Hypertension  Nonischemic cardiomyopathy  No significant past surgical history  	  Vitals:  T(C): 36.4 (11-27-21 @ 16:15), Max: 37.2 (11-27-21 @ 04:28)  HR: 69 (11-27-21 @ 16:15) (68 - 85)  BP: 102/73 (11-27-21 @ 16:15) (96/68 - 116/81)  RR: 18 (11-27-21 @ 16:15) (18 - 18)  SpO2: 95% (11-27-21 @ 16:15) (91% - 99%)  Wt(kg): --  I&O's Summary    26 Nov 2021 07:01  -  27 Nov 2021 07:00  --------------------------------------------------------  IN: 0 mL / OUT: 3350 mL / NET: -3350 mL    PHYSICAL EXAM:  Appearance: Comfortable. No acute distress, ill appearing male  HEENT:  Head and neck: Atraumatic, , Neck is supple.    Neurologic: A & O x 3, no focal deficits.   Cardiovascular: Normal S1 S2, No murmur, rubs/gallops.  Respiratory: Lungs clear to auscultation  Gastrointestinal:  Soft, Non-tender, + BS, distended.   Lower Extremities: + 1 edema  Psychiatry: Patient is calm. No agitation. Mood & affect appropriate  Skin: No rash , warm and dry.     CURRENT MEDICATIONS:  bisoprolol   Tablet 5 milliGRAM(s) Oral daily  buMETAnide Infusion 0.5 mG/Hr IV Continuous <Continuous>  milrinone Infusion 0.25 MICROgram(s)/kG/Min IV Continuous <Continuous>  spironolactone 25 milliGRAM(s) Oral daily  ALBUTerol    90 MICROgram(s) HFA Inhaler  albuterol/ipratropium for Nebulization  budesonide 160 MICROgram(s)/formoterol 4.5 MICROgram(s) Inhaler  tiotropium 18 MICROgram(s) Capsule  melatonin  atorvastatin  glucagon  Injectable  insulin lispro (ADMELOG) corrective regimen sliding scale  aspirin enteric coated  calcium acetate  heparin  Infusion.  influenza   Vaccine  magnesium sulfate  IVPB    LABS:	 	                       15.6   5.56  )-----------( 148      ( 27 Nov 2021 08:26 )             47.5   11/26  139  |  93<L>  |  69.3<H>  ----------------------------<  94  3.0<L>   |  28.0  |  1.59<H>  Ca    8.9      26 Nov 2021 05:26  Phos  2.0     11-27  Mg     1.9     11-27    HgA1c: TSH: Thyroid Stimulating Hormone, Serum: 4.18 uIU/mL    TELEMETRY:  Sr with occassional PVC's   	                                                                [place] [facility]                                                        Office: [address] [address]                                                       Telephone: [phone]. Fax:[phone]                                                                             PROGRESS NOTE   Reason for follow up:    Aflutter, AICD firing/Decompensated HFrEF                            Overnight: No new events.   Update:   Sitting up in bed, Bumex heparin and milrinone infusing.  Telemetry continues in SR with PVC's  Subjective: "Nothing"   Complains of:  Nothing  Review of symptoms: Cardiac:  No chest pain. No dyspnea. No palpitations.  Respiratory: no cough. No dyspnea  Gastrointestinal: No diarrhea. No abdominal pain. No bleeding.     Past medical history: No updates.   Chronic conditions:  PAST MEDICAL & SURGICAL HISTORY:  Heart failure, systolic  CAD (coronary artery disease)  Hypertension  Nonischemic cardiomyopathy  No significant past surgical history  	  Vitals:  T(C): 36.4 (11-27-21 @ 16:15), Max: 37.2 (11-27-21 @ 04:28)  HR: 69 (11-27-21 @ 16:15) (68 - 85)  BP: 102/73 (11-27-21 @ 16:15) (96/68 - 116/81)  RR: 18 (11-27-21 @ 16:15) (18 - 18)  SpO2: 95% (11-27-21 @ 16:15) (91% - 99%)  Wt(kg): --  I&O's Summary    26 Nov 2021 07:01  -  27 Nov 2021 07:00  --------------------------------------------------------  IN: 0 mL / OUT: 3350 mL / NET: -3350 mL    PHYSICAL EXAM:  Appearance: Comfortable. No acute distress, ill appearing male  HEENT:  Head and neck: Atraumatic, , Neck is supple.    Neurologic: A & O x 3, no focal deficits.   Cardiovascular: Normal S1 S2, No murmur, rubs/gallops.  Respiratory: Lungs clear to auscultation  Gastrointestinal:  Soft, Non-tender, + BS, distended, ascites    Lower Extremities: + 1 edema  Psychiatry: Patient is calm. No agitation. Mood & affect appropriate  Skin: No rash , warm and dry.     CURRENT MEDICATIONS:  bisoprolol   Tablet 5 milliGRAM(s) Oral daily  buMETAnide Infusion 0.5 mG/Hr IV Continuous <Continuous>  milrinone Infusion 0.25 MICROgram(s)/kG/Min IV Continuous <Continuous>  spironolactone 25 milliGRAM(s) Oral daily  ALBUTerol    90 MICROgram(s) HFA Inhaler  albuterol/ipratropium for Nebulization  budesonide 160 MICROgram(s)/formoterol 4.5 MICROgram(s) Inhaler  tiotropium 18 MICROgram(s) Capsule  melatonin  atorvastatin  glucagon  Injectable  insulin lispro (ADMELOG) corrective regimen sliding scale  aspirin enteric coated  calcium acetate  heparin  Infusion.  influenza   Vaccine  magnesium sulfate  IVPB    LABS:	 	                       15.6   5.56  )-----------( 148      ( 27 Nov 2021 08:26 )             47.5   11/26  139  |  93<L>  |  69.3<H>  ----------------------------<  94  3.0<L>   |  28.0  |  1.59<H>  Ca    8.9      26 Nov 2021 05:26  Phos  2.0     11-27  Mg     1.9     11-27    HgA1c: TSH: Thyroid Stimulating Hormone, Serum: 4.18 uIU/mL    TELEMETRY:  Sr with occasional PVC's

## 2021-11-27 NOTE — PROGRESS NOTE ADULT - ASSESSMENT
60yo M w/ PMHx of CAD, Nonischemic Cardiomyopathy - CHF (EF in 2016 15-20%) s/p ICD (reports placed about 5 months ago by Dr. Shaikh), nonobstructive CAD, HTN admitted after his ICD fired multiple times, worsening dyspnea due to acute on chronic systolic congestive heart failure exacerbation likely due to dietary noncompliance.  TTE with EF < 20%.       Acute Decompensated HFrEF EF <20%.   - Improving.  bp remains soft low 100's.    - Continue Bumex gtt at 0.5mg/hr, but unable to increase at this time due to hypotension.   - Continue milrinone 0.25mcg/hr.   - continue spironolactone  - IN: 0 mL / OUT: 3350 mL / NET: -3350 mL  - Daily bmp  - Continue bisoprolol  - Nephrology following  - Advanced Heart Failure following.     Atrial Flutter - Sr controlled.   - s/p VANESSA and DCCV - rate controlled in SR today.    - Continue bisoprolol and Amiodarone   - Continue with Heprin   -DOAC for before discharge.     Wait for MD for final recommendations

## 2021-11-27 NOTE — PROGRESS NOTE ADULT - ASSESSMENT
60yo M w/ PMHx of CAD, Nonischemic Cardiomyopathy - CHF (EF in 2016 15-20%) s/p ICD (reports placed about 5 months ago by Dr. Shaikh),and  HTN admitted after his ICD fired multiple times, worsening dyspnea ,found to have rapid atrial f;lutter  pt is seen by cardiology and EPS , treated with iv lasix , oxygen ., medication adjusted , pt is with worsening renal disease , fluid overload Rate uncontrolled , VANESSA CV performed , cont anticoagulation v heparin     #Acute on cronic systolic heart failure   - on Milrinone drip , rate controlled   - moderate ascites may benefit from paracentesis however can not stop anticoagulation per EP   - will defer paracentesis for now , continue diuresis  - cardiology / HF team follow up   - Bumex and milrinone gtt 11/24, tolerate well - continue for now  - daily BMP  - Started on Spironolactone  - losartan 25mg daily today  - continue CM for now    #ARF / hyperkalemia likely secondary to cardiorenal syndrome   - cr is rising likely due to losartan / diuretics   - nephrology follow up   - crt improving     #Atrial flutter with high rate , new onset   - s/p VANESSA cardioversion   - NSR controlled , cont amino, bystolic , anticoagulation iv heparin     #Acute hypoxic resp failure   - likely  multifactorial CHF , A flutter , underlying lung disease ?COPD ,  active smoker   - continue to monitor for now    #ICD discharge - innappropriate shocks as per EP; adjusted  - cont to follow   - stable     #CAD , non obstructive   - cont aspirin statin   - CP atypical resolved   - troponin neg     #Controlled Diabetes Mellitus , new ?   - hba1c 6.3   - cont diet , diabetic education   - ISS     #Nicotine use disorder   - nicotine patch   - smoking cessation provided     #Substance use   - urine tox cocaine and THC +   - pt aware not use cocaine on BB     #Moderate protein spencer malnutrition   - cont diet   - cont diet      DVT ppx on iv heparin, likely transition to PO at time of discharge pending renal function   Cont Milrinone and Bumex drip over the weekend

## 2021-11-28 DIAGNOSIS — Z71.89 OTHER SPECIFIED COUNSELING: ICD-10-CM

## 2021-11-28 LAB
ANION GAP SERPL CALC-SCNC: 13 MMOL/L — SIGNIFICANT CHANGE UP (ref 5–17)
APTT BLD: 40 SEC — HIGH (ref 27.5–35.5)
APTT BLD: 57.1 SEC — HIGH (ref 27.5–35.5)
BUN SERPL-MCNC: 44.9 MG/DL — HIGH (ref 8–20)
CALCIUM SERPL-MCNC: 9.2 MG/DL — SIGNIFICANT CHANGE UP (ref 8.6–10.2)
CHLORIDE SERPL-SCNC: 90 MMOL/L — LOW (ref 98–107)
CO2 SERPL-SCNC: 28 MMOL/L — SIGNIFICANT CHANGE UP (ref 22–29)
CREAT SERPL-MCNC: 1.36 MG/DL — HIGH (ref 0.5–1.3)
GLUCOSE BLDC GLUCOMTR-MCNC: 112 MG/DL — HIGH (ref 70–99)
GLUCOSE BLDC GLUCOMTR-MCNC: 118 MG/DL — HIGH (ref 70–99)
GLUCOSE BLDC GLUCOMTR-MCNC: 132 MG/DL — HIGH (ref 70–99)
GLUCOSE SERPL-MCNC: 119 MG/DL — HIGH (ref 70–99)
HCT VFR BLD CALC: 45.7 % — SIGNIFICANT CHANGE UP (ref 39–50)
HGB BLD-MCNC: 15.3 G/DL — SIGNIFICANT CHANGE UP (ref 13–17)
MCHC RBC-ENTMCNC: 29.5 PG — SIGNIFICANT CHANGE UP (ref 27–34)
MCHC RBC-ENTMCNC: 33.5 GM/DL — SIGNIFICANT CHANGE UP (ref 32–36)
MCV RBC AUTO: 88.2 FL — SIGNIFICANT CHANGE UP (ref 80–100)
PLATELET # BLD AUTO: 131 K/UL — LOW (ref 150–400)
POTASSIUM SERPL-MCNC: 4.3 MMOL/L — SIGNIFICANT CHANGE UP (ref 3.5–5.3)
POTASSIUM SERPL-SCNC: 4.3 MMOL/L — SIGNIFICANT CHANGE UP (ref 3.5–5.3)
RBC # BLD: 5.18 M/UL — SIGNIFICANT CHANGE UP (ref 4.2–5.8)
RBC # FLD: 15.6 % — HIGH (ref 10.3–14.5)
SODIUM SERPL-SCNC: 131 MMOL/L — LOW (ref 135–145)
WBC # BLD: 4.47 K/UL — SIGNIFICANT CHANGE UP (ref 3.8–10.5)
WBC # FLD AUTO: 4.47 K/UL — SIGNIFICANT CHANGE UP (ref 3.8–10.5)

## 2021-11-28 PROCEDURE — 99233 SBSQ HOSP IP/OBS HIGH 50: CPT

## 2021-11-28 PROCEDURE — 99232 SBSQ HOSP IP/OBS MODERATE 35: CPT

## 2021-11-28 RX ORDER — LOSARTAN POTASSIUM 100 MG/1
12.5 TABLET, FILM COATED ORAL DAILY
Refills: 0 | Status: DISCONTINUED | OUTPATIENT
Start: 2021-11-28 | End: 2021-11-29

## 2021-11-28 RX ADMIN — Medication 81 MILLIGRAM(S): at 10:35

## 2021-11-28 RX ADMIN — SPIRONOLACTONE 25 MILLIGRAM(S): 25 TABLET, FILM COATED ORAL at 05:45

## 2021-11-28 RX ADMIN — HEPARIN SODIUM 1200 UNIT(S)/HR: 5000 INJECTION INTRAVENOUS; SUBCUTANEOUS at 17:12

## 2021-11-28 RX ADMIN — HEPARIN SODIUM 3000 UNIT(S): 5000 INJECTION INTRAVENOUS; SUBCUTANEOUS at 10:12

## 2021-11-28 RX ADMIN — Medication 3 MILLILITER(S): at 21:38

## 2021-11-28 RX ADMIN — Medication 3 MILLILITER(S): at 16:35

## 2021-11-28 RX ADMIN — HEPARIN SODIUM 1000 UNIT(S)/HR: 5000 INJECTION INTRAVENOUS; SUBCUTANEOUS at 10:09

## 2021-11-28 RX ADMIN — Medication 3 MILLILITER(S): at 09:23

## 2021-11-28 RX ADMIN — Medication 3 MILLIGRAM(S): at 23:21

## 2021-11-28 RX ADMIN — BISOPROLOL FUMARATE 5 MILLIGRAM(S): 10 TABLET, FILM COATED ORAL at 05:45

## 2021-11-28 RX ADMIN — HEPARIN SODIUM 3000 UNIT(S): 5000 INJECTION INTRAVENOUS; SUBCUTANEOUS at 17:19

## 2021-11-28 RX ADMIN — Medication 3 MILLILITER(S): at 04:47

## 2021-11-28 RX ADMIN — AMIODARONE HYDROCHLORIDE 200 MILLIGRAM(S): 400 TABLET ORAL at 05:45

## 2021-11-28 RX ADMIN — ATORVASTATIN CALCIUM 40 MILLIGRAM(S): 80 TABLET, FILM COATED ORAL at 23:21

## 2021-11-28 NOTE — PROGRESS NOTE ADULT - ATTENDING COMMENTS
Above noted, pt was seen and examined  Plan of care NP  Pt is on primacor 0.25 mcg/kg/min and bumex 0.5 mg/hr  Making good urine  HF meds are increased as tolerated.  Nephrology input is appreciated.  s/p TTE/DCC and is on AC with heparin.

## 2021-11-28 NOTE — PROGRESS NOTE ADULT - ASSESSMENT
DERRICK: improved but still with superimposed prerenal component  CM (EF < 20%), decompensated CHF---> compensated  No hydronephrosis seen on CT scan  - cont to avoid potential nephrotoxins  - cont Bumex infusion, inotrope, aldactone as per cardiology  - daily weights, follow labs--> accept a degree of azotemia for long term benefits  - monitor K+ closely on Aldactone; would be cautious with ACE or ARB    RO: serum phos low  - d/c Phoslo

## 2021-11-28 NOTE — PROGRESS NOTE ADULT - SUBJECTIVE AND OBJECTIVE BOX
NEPHROLOGY INTERVAL HPI/OVERNIGHT EVENTS:  pt clinically stable  no acute sob, cp, n/v/d    MEDICATIONS  (STANDING):  ALBUTerol    90 MICROgram(s) HFA Inhaler 2 Puff(s) Inhalation every 6 hours  albuterol/ipratropium for Nebulization 3 milliLiter(s) Nebulizer every 6 hours  aMIOdarone    Tablet 200 milliGRAM(s) Oral daily  aspirin enteric coated 81 milliGRAM(s) Oral daily  atorvastatin 40 milliGRAM(s) Oral at bedtime  bisoprolol   Tablet 5 milliGRAM(s) Oral daily  budesonide 160 MICROgram(s)/formoterol 4.5 MICROgram(s) Inhaler 2 Puff(s) Inhalation two times a day  buMETAnide Infusion 0.5 mG/Hr (2.5 mL/Hr) IV Continuous <Continuous>  calcium acetate 667 milliGRAM(s) Oral three times a day with meals  dextrose 40% Gel 15 Gram(s) Oral once  dextrose 5%. 1000 milliLiter(s) (50 mL/Hr) IV Continuous <Continuous>  dextrose 5%. 1000 milliLiter(s) (100 mL/Hr) IV Continuous <Continuous>  dextrose 50% Injectable 25 Gram(s) IV Push once  dextrose 50% Injectable 12.5 Gram(s) IV Push once  dextrose 50% Injectable 25 Gram(s) IV Push once  glucagon  Injectable 1 milliGRAM(s) IntraMuscular once  heparin  Infusion. 1000 Unit(s)/Hr (10 mL/Hr) IV Continuous <Continuous>  influenza   Vaccine 0.5 milliLiter(s) IntraMuscular once  insulin lispro (ADMELOG) corrective regimen sliding scale   SubCutaneous three times a day before meals  magnesium sulfate  IVPB 2 Gram(s) IV Intermittent once  melatonin 3 milliGRAM(s) Oral at bedtime  milrinone Infusion 0.25 MICROgram(s)/kG/Min (5.45 mL/Hr) IV Continuous <Continuous>  nicotine -  14 mG/24Hr(s) Patch 1 patch Transdermal daily  spironolactone 25 milliGRAM(s) Oral daily  tiotropium 18 MICROgram(s) Capsule 1 Capsule(s) Inhalation daily    MEDICATIONS  (PRN):  acetaminophen     Tablet .. 650 milliGRAM(s) Oral every 6 hours PRN Temp greater or equal to 38C (100.4F), Mild Pain (1 - 3)  aluminum hydroxide/magnesium hydroxide/simethicone Suspension 30 milliLiter(s) Oral every 4 hours PRN Dyspepsia  heparin   Injectable 6000 Unit(s) IV Push every 6 hours PRN For aPTT less than 40  heparin   Injectable 3000 Unit(s) IV Push every 6 hours PRN For aPTT between 40 - 57  melatonin 3 milliGRAM(s) Oral at bedtime PRN Insomnia  ondansetron Injectable 4 milliGRAM(s) IV Push every 8 hours PRN Nausea and/or Vomiting  traMADol 50 milliGRAM(s) Oral every 4 hours PRN Severe Pain (7 - 10)      Allergies    No Known Allergies    Intolerances        Vital Signs Last 24 Hrs  T(C): 36.4 (28 Nov 2021 04:44), Max: 36.9 (28 Nov 2021 00:08)  T(F): 97.6 (28 Nov 2021 04:44), Max: 98.5 (28 Nov 2021 00:08)  HR: 71 (28 Nov 2021 04:44) (68 - 80)  BP: 100/64 (28 Nov 2021 04:44) (96/62 - 110/72)  BP(mean): --  RR: 18 (28 Nov 2021 04:44) (16 - 18)  SpO2: 95% (28 Nov 2021 04:50) (94% - 100%)    PHYSICAL EXAM:  GENERAL: Comfortable, fatigued  NECK: Supple, No JVD  NERVOUS SYSTEM:  A/O x3, non focal  CHEST: Diminished BS noted  HEART:  RRR, no rub  ABDOMEN: Soft, + distension +BS  EXTREMITIES: + dependent edema improved    LABS:                        15.6   5.56  )-----------( 148      ( 27 Nov 2021 08:26 )             47.5     11-27    136  |  89<L>  |  45.6<H>  ----------------------------<  159<H>  4.3   |  33.0<H>  |  1.47<H>    Ca    9.2      27 Nov 2021 19:19  Phos  2.0     11-27  Mg     1.9     11-27      PTT - ( 27 Nov 2021 08:26 )  PTT:69.8 sec    Magnesium, Serum: 1.9 mg/dL (11-27 @ 08:26)  Phosphorus Level, Serum: 2.0 mg/dL (11-27 @ 08:26)      RADIOLOGY & ADDITIONAL TESTS:  < from: CT Abdomen and Pelvis No Cont (11.22.21 @ 11:51) >   EXAM:  CT ABDOMEN AND PELVIS                         EXAM:  CT CHEST                          PROCEDURE DATE:  11/22/2021          INTERPRETATION:  CLINICAL INFORMATION: Shortness of breath, right-sided chest pain with breathing.    COMPARISON: November 23, 2016.    CONTRAST/COMPLICATIONS:  IV Contrast: NONE  Oral Contrast: NONE  Complications: None reported at time of study completion    PROCEDURE:  CT of the Chest, Abdomen and Pelvis was performed.  Sagittal and coronal reformats were performed.    FINDINGS:  CHEST:  LUNGS AND LARGE AIRWAYS: Patent central airways. Moderate centrilobular emphysematous changes, most prominent in the right upper lobe. Stable 4 mm pleural tag in the left lower lobe on image 89. No new discrete pulmonary nodule or confluent airspace opacity is identified.  PLEURA: Stable mild to moderate layering right pleural effusion with moderate rounded subpleural atelectasis in the right middle lobe and right lower lobe.  VESSELS: Within normal limits.  HEART: Left-sided dual-lead cardiac pacer. The heart is enlarged. No pericardial effusion.  MEDIASTINUM AND AIDA: No lymphadenopathy.  CHEST WALL AND LOWER NECK: Severe bilateral gynecomastia.    ABDOMEN AND PELVIS:  LIVER: The liver is bulbous in contour with prominence of the left lobe laterally and caudate lobe, suggestive of a cirrhotic morphology.  BILE DUCTS: Normal caliber.  GALLBLADDER: Small layering gallstones in the gallbladder.  SPLEEN: Within normal limits.  PANCREAS: Moderate atrophy and fatty replacement of the pancreas.  ADRENALS: Within normal limits.  KIDNEYS/URETERS: Within normal limits.    BLADDER: Minimally distended.  REPRODUCTIVE ORGANS: The prostate is not enlarged.    BOWEL: Scattered sigmoid diverticula. No bowel obstruction.  PERITONEUM: Moderate free fluid in the peritoneal cavity, suggestive of ascites.  VESSELS: Within normal limits.  RETROPERITONEUM/LYMPH NODES: No lymphadenopathy.  ABDOMINAL WALL: Right inguinal hernia containing peritoneal fluid.  BONES: Within normallimits.    IMPRESSION:  1. Stable mild to moderate right pleural effusion with moderate rounded atelectasis in the right middle lobe and right lower lobe.  2. Stable cardiomegaly.  3. Cirrhotic liver morphology with moderate ascites.    < end of copied text >

## 2021-11-28 NOTE — PROGRESS NOTE ADULT - SUBJECTIVE AND OBJECTIVE BOX
[place] [facility]                                                        Office: [address] [address]                                                       Telephone: [phone]. Fax:[phone]                                                                             PROGRESS NOTE   Reason for follow up:   Aflutter, AICD firing/Decompensated HFrEF                            Overnight: No new events.   Update:   Patient lying in bed, continues with Bumex milrinone and spirolactone  patient states he is putting out of urine, clinically appears uvolemic, lungs clear, legs are not swolline and ascites has decreased.  Renal states to monitor K+ closely on Aldactone; would be cautious with ACE or ARB.  IN: 875.6 mL / OUT: 2000 mL / NET: -1124.4 mL  Subjective: "I feel the best I felt, been walking around my room"   Complains of:  Room  Review of symptoms: Cardiac:  No chest pain. No dyspnea. No palpitations.  Respiratory: no cough. No dyspnea  Gastrointestinal: No diarrhea. No abdominal pain. No bleeding.     Past medical history: No updates.   Chronic conditions: PAST MEDICAL & SURGICAL HISTORY:  Heart failure, systolic  CAD (coronary artery disease)  Hypertension  Nonischemic cardiomyopathy  No significant past surgical history    Vitals:  T(C): 36.5 (11-28-21 @ 16:07), Max: 36.9 (11-28-21 @ 00:08)  HR: 75 (11-28-21 @ 16:07) (69 - 78)  BP: 99/63 (11-28-21 @ 16:07) (96/62 - 106/62)  RR: 18 (11-28-21 @ 16:07) (16 - 18)  SpO2: 95% (11-28-21 @ 16:07) (93% - 100%)    I&O's Summary  27 Nov 2021 07:01  -  28 Nov 2021 07:00  --------------------------------------------------------  IN: 875.6 mL / OUT: 2000 mL / NET: -1124.4 mL  28 Nov 2021 07:01  -  28 Nov 2021 16:13  --------------------------------------------------------  IN: 160.7 mL / OUT: 800 mL / NET: -639.3 mL    PHYSICAL EXAM:  Appearance: Comfortable. No acute distress  HEENT:  Head and neck: Atraumatic. Normocephalic.  Normal oral mucosa, PERRL, Neck is supple.  Neurologic: A & O x 3, no focal deficits  Cardiovascular: Normal S1 S2, No murmur, rubs/gallops. No JVD, No edema  Respiratory: Lungs clear to auscultation  Gastrointestinal:  Soft, Non-tender, + BS, decreasing ascites abdomen still remains dull  Lower Extremities: No edema  Psychiatry: Patient is calm. No agitation. Mood & affect appropriate  Skin: No rash, warm and dry      CURRENT MEDICATIONS:  aMIOdarone    Tablet 200 milliGRAM(s) Oral daily  bisoprolol   Tablet 5 milliGRAM(s) Oral daily  buMETAnide Infusion 0.5 mG/Hr IV Continuous <Continuous>  milrinone Infusion 0.25 MICROgram(s)/kG/Min IV Continuous <Continuous>  spironolactone 25 milliGRAM(s) Oral daily  ALBUTerol    90 MICROgram(s) HFA Inhaler  albuterol/ipratropium for Nebulization  budesonide 160 MICROgram(s)/formoterol 4.5 MICROgram(s) Inhaler  tiotropium 18 MICROgram(s) Capsule  melatonin  atorvastatin  insulin lispro (ADMELOG) corrective regimen sliding scale  aspirin enteric coated  heparin  Infusion.  influenza   Vaccine  magnesium sulfate  IVPB    LABS:	 	                       15.3   4.47  )-----------( 131      ( 28 Nov 2021 08:24 )             45.7   11-28  131<L>  |  90<L>  |  44.9<H>  ----------------------------<  119<H>  4.3   |  28.0  |  1.36<H>  Ca    9.2      28 Nov 2021 08:21  Phos  2.0     11-27  Mg     1.9     11-27  HgA1c: TSH: Thyroid Stimulating Hormone, Serum: 4.18 uIU/mL    TELEMETRY: Reviewed

## 2021-11-28 NOTE — PROGRESS NOTE ADULT - ASSESSMENT
60yo M w/ PMHx of CAD, Nonischemic Cardiomyopathy - CHF (EF in 2016 15-20%) s/p ICD (reports placed about 5 months ago by Dr. Shaikh), nonobstructive CAD, HTN admitted after his ICD fired multiple times, worsening dyspnea due to acute on chronic systolic congestive heart failure exacerbation likely due to dietary noncompliance.  TTE with EF < 20%.       11/28/21:  Patient lying in bed, continues with Bumex milrinone and spirolactone  patient states he is putting out of urine, clinically appears uvolemic, lungs clear, legs are not swollen and ascites has decreased.  Renal states to monitor K+ closely on Aldactone; would be cautious with ACE or ARB.  IN: 875.6 mL / OUT: 2000 mL / NET: -1124.4 mL      Acute Decompensated HFrEF EF <20%.   - Improving.  bp remains soft low 100's.    -Added losartan 12.5 po daily   - Continue Bumex gtt at 0.5mg/hr, but unable to increase at this time due to hypotension.   - Continue milrinone 0.25mcg/hr.   - continue spironolactone  - Monitor K closely as recommended by nephrology with ARB, spironolactone    -IN: 875.6 mL / OUT: 2000 mL / NET: -1124.4 mL  - Daily bmp  - Continue bisoprolol  - Nephrology following  - Advanced Heart Failure following.     Atrial Flutter - Sr controlled.   - s/p VANESSA and DCCV - Continues with SR with occasional PVC.    - Continue bisoprolol and Amiodarone   - Continue with Heprin   -DOAC for before discharge.     Wait for MD for final recommendations

## 2021-11-28 NOTE — PROGRESS NOTE ADULT - ASSESSMENT
60yo M w/ PMHx of CAD, Nonischemic Cardiomyopathy - CHF (EF in 2016 15-20%) s/p ICD (reports placed about 5 months ago by Dr. Shaikh),and  HTN admitted after his ICD fired multiple times, worsening dyspnea ,found to have rapid atrial f;lutter  pt is seen by cardiology and EPS , treated with iv lasix , oxygen ., medication adjusted , pt is with worsening renal disease , fluid overload Rate uncontrolled , VANESSA CV performed , cont anticoagulation v heparin     #Acute on cronic systolic heart failure   - on Milrinone drip , rate controlled   - moderate ascites may benefit from paracentesis however can not stop anticoagulation per EP   - will defer paracentesis for now , continue diuresis  - cardiology / HF team follow up   - Bumex and milrinone gtt 11/24, tolerate well - bumex dose decreased as per nephrology  - daily BMP  - Started on Spironolactone  - losartan 25mg daily today  - continue CM for now    #ARF / hyperkalemia likely secondary to cardiorenal syndrome   - cr is rising likely due to losartan / diuretics   - nephrology follow up   - crt improving     #Atrial flutter with high rate , new onset   - s/p VANESSA cardioversion   - NSR controlled , cont amino, bystolic , anticoagulation iv heparin     #Acute hypoxic resp failure   - likely  multifactorial CHF , A flutter , underlying lung disease ?COPD ,  active smoker   - continue to monitor for now    #ICD discharge - innappropriate shocks as per EP; adjusted  - cont to follow   - stable     #CAD , non obstructive   - cont aspirin statin   - CP atypical resolved   - troponin neg     #Controlled Diabetes Mellitus , new ?   - hba1c 6.3   - cont diet , diabetic education   - ISS     #Nicotine use disorder   - nicotine patch   - smoking cessation provided     #Substance use   - urine tox cocaine and THC +   - pt aware not use cocaine on BB     #Moderate protein spencer malnutrition   - cont diet   - cont diet      DVT ppx on iv heparin, likely transition to PO at time of discharge pending renal function   Cont Milrinone and Bumex drip over the weekend

## 2021-11-28 NOTE — PROGRESS NOTE ADULT - SUBJECTIVE AND OBJECTIVE BOX
CC: Jhonny, AICD firing (28 Nov 2021 07:00)    INTERVAL HPI/OVERNIGHT EVENTS:  no acute events overnight  without acute complaints aside from frequent urination    Vital Signs Last 24 Hrs  T(C): 36.7 (28 Nov 2021 10:36), Max: 36.9 (28 Nov 2021 00:08)  T(F): 98 (28 Nov 2021 10:36), Max: 98.5 (28 Nov 2021 00:08)  HR: 78 (28 Nov 2021 14:04) (69 - 78)  BP: 106/62 (28 Nov 2021 14:04) (96/62 - 106/62)  BP(mean): --  RR: 18 (28 Nov 2021 10:36) (16 - 18)  SpO2: 93% (28 Nov 2021 10:36) (93% - 100%)    PHYSICAL EXAM:  General: in no acute distress  Eyes: PERRLA, EOMI; conjunctiva and sclera clear  Head: Normocephalic; atraumatic  ENMT: No nasal discharge; airway clear  Neck: Supple; non tender; no masses  Respiratory: No wheezes, rales or rhonchi  Cardiovascular: Regular rate and rhythm. S1 and S2 Normal; No murmurs, gallops or rubs  Gastrointestinal: Soft non-tender non-distended; Normal bowel sounds  Genitourinary: No costovertebral angle tenderness  Extremities: Normal range of motion, No clubbing, cyanosis; pitting edema in the lower ext appears improved  Neurological: Alert and oriented x4  Skin: Warm and dry. No acute rash  Psychiatric: Cooperative and appropriate    I&O's Detail    27 Nov 2021 07:01  -  28 Nov 2021 07:00  --------------------------------------------------------  IN:    Bumetanide: 30 mL    Heparin Infusion: 96 mL    Milrinone: 69.6 mL    Oral Fluid: 680 mL  Total IN: 875.6 mL    OUT:    Voided (mL): 2000 mL  Total OUT: 2000 mL    Total NET: -1124.4 mL    28 Nov 2021 07:01  -  28 Nov 2021 15:01  --------------------------------------------------------  IN:    Bumetanide: 17.5 mL    Heparin Infusion: 66 mL    Milrinone: 40.6 mL  Total IN: 124.1 mL    OUT:    Voided (mL): 800 mL  Total OUT: 800 mL    Total NET: -675.9 mL                        15.3   4.47  )-----------( 131      ( 28 Nov 2021 08:24 )             45.7     28 Nov 2021 08:21    131    |  90     |  44.9   ----------------------------<  119    4.3     |  28.0   |  1.36     Ca    9.2        28 Nov 2021 08:21  Phos  2.0       27 Nov 2021 08:26  Mg     1.9       27 Nov 2021 08:26    PTT - ( 28 Nov 2021 08:22 )  PTT:57.1 sec    CAPILLARY BLOOD GLUCOSE  POCT Blood Glucose.: 132 mg/dL (28 Nov 2021 12:23)  POCT Blood Glucose.: 112 mg/dL (28 Nov 2021 08:42)  POCT Blood Glucose.: 133 mg/dL (27 Nov 2021 21:52)  POCT Blood Glucose.: 109 mg/dL (27 Nov 2021 16:56)    MEDICATIONS  (STANDING):  ALBUTerol    90 MICROgram(s) HFA Inhaler 2 Puff(s) Inhalation every 6 hours  albuterol/ipratropium for Nebulization 3 milliLiter(s) Nebulizer every 6 hours  aMIOdarone    Tablet 200 milliGRAM(s) Oral daily  aspirin enteric coated 81 milliGRAM(s) Oral daily  atorvastatin 40 milliGRAM(s) Oral at bedtime  bisoprolol   Tablet 5 milliGRAM(s) Oral daily  budesonide 160 MICROgram(s)/formoterol 4.5 MICROgram(s) Inhaler 2 Puff(s) Inhalation two times a day  buMETAnide Infusion 0.5 mG/Hr (2.5 mL/Hr) IV Continuous <Continuous>  dextrose 40% Gel 15 Gram(s) Oral once  dextrose 5%. 1000 milliLiter(s) (50 mL/Hr) IV Continuous <Continuous>  dextrose 5%. 1000 milliLiter(s) (100 mL/Hr) IV Continuous <Continuous>  dextrose 50% Injectable 25 Gram(s) IV Push once  dextrose 50% Injectable 12.5 Gram(s) IV Push once  dextrose 50% Injectable 25 Gram(s) IV Push once  glucagon  Injectable 1 milliGRAM(s) IntraMuscular once  heparin  Infusion. 1000 Unit(s)/Hr (10 mL/Hr) IV Continuous <Continuous>  influenza   Vaccine 0.5 milliLiter(s) IntraMuscular once  insulin lispro (ADMELOG) corrective regimen sliding scale   SubCutaneous three times a day before meals  magnesium sulfate  IVPB 2 Gram(s) IV Intermittent once  melatonin 3 milliGRAM(s) Oral at bedtime  milrinone Infusion 0.25 MICROgram(s)/kG/Min (5.45 mL/Hr) IV Continuous <Continuous>  nicotine -  14 mG/24Hr(s) Patch 1 patch Transdermal daily  spironolactone 25 milliGRAM(s) Oral daily  tiotropium 18 MICROgram(s) Capsule 1 Capsule(s) Inhalation daily    MEDICATIONS  (PRN):  acetaminophen     Tablet .. 650 milliGRAM(s) Oral every 6 hours PRN Temp greater or equal to 38C (100.4F), Mild Pain (1 - 3)  aluminum hydroxide/magnesium hydroxide/simethicone Suspension 30 milliLiter(s) Oral every 4 hours PRN Dyspepsia  heparin   Injectable 6000 Unit(s) IV Push every 6 hours PRN For aPTT less than 40  heparin   Injectable 3000 Unit(s) IV Push every 6 hours PRN For aPTT between 40 - 57  melatonin 3 milliGRAM(s) Oral at bedtime PRN Insomnia  ondansetron Injectable 4 milliGRAM(s) IV Push every 8 hours PRN Nausea and/or Vomiting  traMADol 50 milliGRAM(s) Oral every 4 hours PRN Severe Pain (7 - 10)      RADIOLOGY & ADDITIONAL TESTS:

## 2021-11-29 LAB
ANION GAP SERPL CALC-SCNC: 16 MMOL/L — SIGNIFICANT CHANGE UP (ref 5–17)
APTT BLD: 110.4 SEC — HIGH (ref 27.5–35.5)
APTT BLD: 74.1 SEC — HIGH (ref 27.5–35.5)
APTT BLD: 76.4 SEC — HIGH (ref 27.5–35.5)
APTT BLD: 90.6 SEC — HIGH (ref 27.5–35.5)
BUN SERPL-MCNC: 46.2 MG/DL — HIGH (ref 8–20)
CALCIUM SERPL-MCNC: 9.3 MG/DL — SIGNIFICANT CHANGE UP (ref 8.6–10.2)
CHLORIDE SERPL-SCNC: 90 MMOL/L — LOW (ref 98–107)
CO2 SERPL-SCNC: 28 MMOL/L — SIGNIFICANT CHANGE UP (ref 22–29)
CREAT SERPL-MCNC: 1.63 MG/DL — HIGH (ref 0.5–1.3)
GLUCOSE BLDC GLUCOMTR-MCNC: 115 MG/DL — HIGH (ref 70–99)
GLUCOSE BLDC GLUCOMTR-MCNC: 99 MG/DL — SIGNIFICANT CHANGE UP (ref 70–99)
GLUCOSE SERPL-MCNC: 132 MG/DL — HIGH (ref 70–99)
HCT VFR BLD CALC: 45.9 % — SIGNIFICANT CHANGE UP (ref 39–50)
HGB BLD-MCNC: 15.1 G/DL — SIGNIFICANT CHANGE UP (ref 13–17)
MCHC RBC-ENTMCNC: 29.2 PG — SIGNIFICANT CHANGE UP (ref 27–34)
MCHC RBC-ENTMCNC: 32.9 GM/DL — SIGNIFICANT CHANGE UP (ref 32–36)
MCV RBC AUTO: 88.6 FL — SIGNIFICANT CHANGE UP (ref 80–100)
PLATELET # BLD AUTO: 149 K/UL — LOW (ref 150–400)
POTASSIUM SERPL-MCNC: 3.9 MMOL/L — SIGNIFICANT CHANGE UP (ref 3.5–5.3)
POTASSIUM SERPL-SCNC: 3.9 MMOL/L — SIGNIFICANT CHANGE UP (ref 3.5–5.3)
RBC # BLD: 5.18 M/UL — SIGNIFICANT CHANGE UP (ref 4.2–5.8)
RBC # FLD: 15.4 % — HIGH (ref 10.3–14.5)
SODIUM SERPL-SCNC: 134 MMOL/L — LOW (ref 135–145)
WBC # BLD: 5.37 K/UL — SIGNIFICANT CHANGE UP (ref 3.8–10.5)
WBC # FLD AUTO: 5.37 K/UL — SIGNIFICANT CHANGE UP (ref 3.8–10.5)

## 2021-11-29 PROCEDURE — 99233 SBSQ HOSP IP/OBS HIGH 50: CPT

## 2021-11-29 RX ORDER — ALBUTEROL 90 UG/1
2 AEROSOL, METERED ORAL EVERY 6 HOURS
Refills: 0 | Status: DISCONTINUED | OUTPATIENT
Start: 2021-11-29 | End: 2021-12-02

## 2021-11-29 RX ORDER — LOSARTAN POTASSIUM 100 MG/1
12.5 TABLET, FILM COATED ORAL DAILY
Refills: 0 | Status: DISCONTINUED | OUTPATIENT
Start: 2021-11-29 | End: 2021-12-02

## 2021-11-29 RX ORDER — LOSARTAN POTASSIUM 100 MG/1
25 TABLET, FILM COATED ORAL DAILY
Refills: 0 | Status: DISCONTINUED | OUTPATIENT
Start: 2021-11-29 | End: 2021-11-29

## 2021-11-29 RX ADMIN — Medication 3 MILLILITER(S): at 03:15

## 2021-11-29 RX ADMIN — BISOPROLOL FUMARATE 5 MILLIGRAM(S): 10 TABLET, FILM COATED ORAL at 06:08

## 2021-11-29 RX ADMIN — MILRINONE LACTATE 5.45 MICROGRAM(S)/KG/MIN: 1 INJECTION, SOLUTION INTRAVENOUS at 11:10

## 2021-11-29 RX ADMIN — AMIODARONE HYDROCHLORIDE 200 MILLIGRAM(S): 400 TABLET ORAL at 06:12

## 2021-11-29 RX ADMIN — LOSARTAN POTASSIUM 12.5 MILLIGRAM(S): 100 TABLET, FILM COATED ORAL at 15:35

## 2021-11-29 RX ADMIN — Medication 650 MILLIGRAM(S): at 06:56

## 2021-11-29 RX ADMIN — HEPARIN SODIUM 1200 UNIT(S)/HR: 5000 INJECTION INTRAVENOUS; SUBCUTANEOUS at 01:41

## 2021-11-29 RX ADMIN — Medication 650 MILLIGRAM(S): at 01:46

## 2021-11-29 RX ADMIN — HEPARIN SODIUM 1000 UNIT(S)/HR: 5000 INJECTION INTRAVENOUS; SUBCUTANEOUS at 23:26

## 2021-11-29 RX ADMIN — Medication 81 MILLIGRAM(S): at 11:11

## 2021-11-29 RX ADMIN — HEPARIN SODIUM 1000 UNIT(S)/HR: 5000 INJECTION INTRAVENOUS; SUBCUTANEOUS at 16:51

## 2021-11-29 RX ADMIN — HEPARIN SODIUM 1000 UNIT(S)/HR: 5000 INJECTION INTRAVENOUS; SUBCUTANEOUS at 10:08

## 2021-11-29 RX ADMIN — ALBUTEROL 2 PUFF(S): 90 AEROSOL, METERED ORAL at 08:46

## 2021-11-29 RX ADMIN — SPIRONOLACTONE 25 MILLIGRAM(S): 25 TABLET, FILM COATED ORAL at 06:09

## 2021-11-29 RX ADMIN — BUMETANIDE 2.5 MG/HR: 0.25 INJECTION INTRAMUSCULAR; INTRAVENOUS at 22:01

## 2021-11-29 NOTE — PHYSICAL THERAPY INITIAL EVALUATION ADULT - ADDITIONAL COMMENTS
Pt reports independent PTA, owns no DME. Pt resides at [facility], unsure of d/c plan

## 2021-11-29 NOTE — PROGRESS NOTE ADULT - ASSESSMENT
60yo M w/ PMHx of CAD, Nonischemic Cardiomyopathy - CHF (EF in 2016 15-20%) s/p ICD (reports placed about 5 months ago by Dr. Shaikh),and  HTN admitted after his ICD fired multiple times, worsening dyspnea ,found to have rapid atrial f;lutter  pt is seen by cardiology and EPS , treated with iv lasix , oxygen ., medication adjusted , pt is with worsening renal disease , fluid overload Rate uncontrolled , VANESSA CV performed , cont anticoagulation v heparin     #Acute on cronic systolic heart failure with edema, ascites; acute hypoxic resp failure present on admission is now improved (off O2)  - on Milrinone drip , rate controlled   - moderate ascites - paracentesis was deferred since couldn't stop AC - appears to be improving with diuresis  - cardiology / HF team follow up   - Bumex and milrinone gtt 11/24, tolerate well - bumex dose decreased as per nephrology  - daily BMP  - check mag/phos in the AM  - c/w Spironolactone  - losartan 25mg daily today  - continue CM for now until off milrinone/bumex drip    #ARF / hyperkalemia likely secondary to cardiorenal syndrome   - cr is rising likely due to losartan / diuretics   - nephrology follow up   - crt improving     #Atrial flutter with high rate , new onset   - s/p VANESSA cardioversion   - NSR controlled , cont amino, bystolic , anticoagulation iv heparin     #Acute hypoxic resp failure   - likely  multifactorial CHF , A flutter , underlying lung disease ?COPD ,  active smoker   - continue to monitor for now    #ICD discharge - inappropriate shocks as per EP; adjusted  - cont to follow   - stable     #CAD , non obstructive   - cont aspirin statin   - CP atypical resolved   - troponin neg     #Controlled Diabetes Mellitus , new ?   - hba1c 6.3   - cont diet , diabetic education   - ISS     #Nicotine use disorder   - nicotine patch   - smoking cessation provided     #Substance use   - urine tox cocaine and THC +   - pt aware not use cocaine on BB     #Moderate protein spencer malnutrition   - cont diet   - cont diet      DVT ppx on iv heparin, likely transition to PO at time of discharge pending renal function   Cont Milrinone and Bumex drip; HF attending to adjust

## 2021-11-29 NOTE — PROGRESS NOTE ADULT - SUBJECTIVE AND OBJECTIVE BOX
Subjective:  No overnight events    Medications:  acetaminophen     Tablet .. 650 milliGRAM(s) Oral every 6 hours PRN  ALBUTerol    90 MICROgram(s) HFA Inhaler 2 Puff(s) Inhalation every 6 hours  albuterol/ipratropium for Nebulization 3 milliLiter(s) Nebulizer every 6 hours  aluminum hydroxide/magnesium hydroxide/simethicone Suspension 30 milliLiter(s) Oral every 4 hours PRN  aMIOdarone    Tablet 400 milliGRAM(s) Oral three times a day  aspirin enteric coated 81 milliGRAM(s) Oral daily  atorvastatin 40 milliGRAM(s) Oral at bedtime  bisoprolol   Tablet 5 milliGRAM(s) Oral daily  budesonide 160 MICROgram(s)/formoterol 4.5 MICROgram(s) Inhaler 2 Puff(s) Inhalation two times a day  buMETAnide Infusion 0.5 mG/Hr IV Continuous <Continuous>  calcium acetate 667 milliGRAM(s) Oral three times a day with meals  dextrose 40% Gel 15 Gram(s) Oral once  dextrose 5%. 1000 milliLiter(s) IV Continuous <Continuous>  dextrose 5%. 1000 milliLiter(s) IV Continuous <Continuous>  dextrose 50% Injectable 25 Gram(s) IV Push once  dextrose 50% Injectable 12.5 Gram(s) IV Push once  dextrose 50% Injectable 25 Gram(s) IV Push once  glucagon  Injectable 1 milliGRAM(s) IntraMuscular once  heparin   Injectable 6000 Unit(s) IV Push every 6 hours PRN  heparin   Injectable 3000 Unit(s) IV Push every 6 hours PRN  heparin  Infusion. 1000 Unit(s)/Hr IV Continuous <Continuous>  influenza   Vaccine 0.5 milliLiter(s) IntraMuscular once  insulin lispro (ADMELOG) corrective regimen sliding scale   SubCutaneous three times a day before meals  magnesium sulfate  IVPB 2 Gram(s) IV Intermittent once  melatonin 3 milliGRAM(s) Oral at bedtime  melatonin 3 milliGRAM(s) Oral at bedtime PRN  methocarbamol 500 milliGRAM(s) Oral three times a day  milrinone Infusion 0.25 MICROgram(s)/kG/Min IV Continuous <Continuous>  nicotine -  14 mG/24Hr(s) Patch 1 patch Transdermal daily  ondansetron Injectable 4 milliGRAM(s) IV Push every 8 hours PRN  potassium chloride    Tablet ER 40 milliEquivalent(s) Oral every 4 hours  tiotropium 18 MICROgram(s) Capsule 1 Capsule(s) Inhalation daily  traMADol 50 milliGRAM(s) Oral every 4 hours PRN    Vitals:  T(C): 36.2 (11-26-21 @ 04:49), Max: 36.6 (11-25-21 @ 21:20)  HR: 79 (11-26-21 @ 04:49) (66 - 93)  BP: 106/62 (11-26-21 @ 04:49) (103/68 - 110/80)  BP(mean): --  RR: 18 (11-26-21 @ 04:49) (18 - 20)  SpO2: 92% (11-26-21 @ 04:49) (92% - 97%)    Daily     Daily         I&O's Summary    25 Nov 2021 07:01  -  26 Nov 2021 07:00  --------------------------------------------------------  IN: 0 mL / OUT: 1800 mL / NET: -1800 mL        Physical Exam:  Appearance: No Acute Distress  HEENT: JVP 5cm  Cardiovascular: RRR, Normal S1 S2, 3/6 holosyst LLSB  Respiratory: Clear to auscultation bilaterally  Gastrointestinal: Soft, Non-tender, mildly distended	  Skin: no skin lesions  Neurologic: Non-focal  Extremities: trace lower extremity edema  Psychiatry: A & O x 3, Mood & affect appropriate      Labs:                        15.6   6.00  )-----------( 146      ( 26 Nov 2021 05:26 )             46.4     11-26    139  |  93<L>  |  69.3<H>  ----------------------------<  94  3.0<L>   |  28.0  |  1.59<H>    Ca    8.9      26 Nov 2021 05:26  Phos  2.4     11-26  Mg     1.9     11-26        PTT - ( 26 Nov 2021 05:26 )  PTT:38.4 sec

## 2021-11-29 NOTE — PROGRESS NOTE ADULT - ASSESSMENT
DERRICK: improved   Superimposed prerenal component  CM (EF < 20%), decompensated CHF---> compensated  No hydronephrosis seen on CT scan  - cont to avoid potential nephrotoxins  - cont Bumex infusion, inotrope, aldactone as per cardiology  - monitor K+ and Screat closely on Aldactone and ARB    RO: serum phos low  - d/c'd Phoslo  - trend serum phos

## 2021-11-29 NOTE — PROGRESS NOTE ADULT - SUBJECTIVE AND OBJECTIVE BOX
CC: Aflutter, AICD firing (29 Nov 2021 06:49)    HPI:  61M with pmh of CAD NICM, HFrEF (15%) s/p AICD, HTN presented to ED c/o palpitations and defibrillator firing. Pt reports to ICD firing 15 times. Pt states he just arrived to [facility] and was outside talking to one of the guys that lives in the same house as him when he suddenly felt sharp electrical shock in his chest, this happed 14 times within 40 minutes with associated palpitations and lightheadedness. Pt stated he had AICD placed 6month ago by Dr. Shaikh at [facility] and never experiencing defibrillator firing until today. Pt also reports to productive cough, worsening exertional dyspnea. He was admitted to  for chf discharged on 11/3 to [facility], states his dietary indiscretion has been very poor as he lives from [facility] to [facility]. He admits to medications compliance. Denies cp, fever, chills, abdominal pain. as per EMS pt was in  AFib w/ RVR and administered Diltiazem IV with rate control into the 90s and pt was given lopressor 5mg IVP in the ED.  Currently w/ A Flutter on Tele monitor and ECG also new onset A Flutter as there is no reports of prior hx.  (19 Nov 2021 00:35)    INTERVAL HPI/OVERNIGHT EVENTS:    Vital Signs Last 24 Hrs  T(C): 36.4 (29 Nov 2021 06:12), Max: 37.2 (28 Nov 2021 19:36)  T(F): 97.6 (29 Nov 2021 06:12), Max: 99 (28 Nov 2021 19:36)  HR: 55 (29 Nov 2021 06:12) (55 - 83)  BP: 100/65 (29 Nov 2021 06:12) (95/64 - 106/62)  BP(mean): --  RR: 18 (29 Nov 2021 06:12) (16 - 18)  SpO2: 99% (29 Nov 2021 06:12) (92% - 99%)      PHYSICAL EXAM:  General: in no acute distress  Eyes: PERRLA, EOMI; conjunctiva and sclera clear  Head: Normocephalic; atraumatic  ENMT: No nasal discharge; airway clear  Neck: Supple; non tender; no masses  Respiratory: No wheezes, rales or rhonchi  Cardiovascular: Regular rate and rhythm. S1 and S2 Normal; No murmurs, gallops or rubs  Gastrointestinal: Soft non-tender non-distended; Normal bowel sounds  Genitourinary: No costovertebral angle tenderness  Extremities: Normal range of motion, No clubbing, cyanosis; pitting edema in the lower ext appears improved  Neurological: Alert and oriented x4  Skin: Warm and dry. No acute rash  Psychiatric: Cooperative and appropriate    I&O's Detail    28 Nov 2021 07:01  -  29 Nov 2021 07:00  --------------------------------------------------------  IN:    Bumetanide: 32.5 mL    Heparin Infusion: 134 mL    Milrinone: 75.4 mL    Oral Fluid: 120 mL  Total IN: 361.9 mL    OUT:    Voided (mL): 3000 mL  Total OUT: 3000 mL    Total NET: -2638.1 mL                        15.1   5.37  )-----------( 149      ( 29 Nov 2021 08:08 )             45.9     29 Nov 2021 08:08    134    |  90     |  46.2   ----------------------------<  132    3.9     |  28.0   |  1.63     Ca    9.3        29 Nov 2021 08:08      PTT - ( 29 Nov 2021 08:08 )  PTT:110.4 sec    CAPILLARY BLOOD GLUCOSE  POCT Blood Glucose.: 115 mg/dL (29 Nov 2021 08:33)  POCT Blood Glucose.: 118 mg/dL (28 Nov 2021 17:29)  POCT Blood Glucose.: 132 mg/dL (28 Nov 2021 12:23)    MEDICATIONS  (STANDING):  ALBUTerol    90 MICROgram(s) HFA Inhaler 2 Puff(s) Inhalation every 6 hours  aMIOdarone    Tablet 200 milliGRAM(s) Oral daily  aspirin enteric coated 81 milliGRAM(s) Oral daily  atorvastatin 40 milliGRAM(s) Oral at bedtime  bisoprolol   Tablet 5 milliGRAM(s) Oral daily  budesonide 160 MICROgram(s)/formoterol 4.5 MICROgram(s) Inhaler 2 Puff(s) Inhalation two times a day  buMETAnide Infusion 0.5 mG/Hr (2.5 mL/Hr) IV Continuous <Continuous>  dextrose 40% Gel 15 Gram(s) Oral once  dextrose 5%. 1000 milliLiter(s) (100 mL/Hr) IV Continuous <Continuous>  dextrose 5%. 1000 milliLiter(s) (50 mL/Hr) IV Continuous <Continuous>  dextrose 50% Injectable 25 Gram(s) IV Push once  dextrose 50% Injectable 12.5 Gram(s) IV Push once  dextrose 50% Injectable 25 Gram(s) IV Push once  glucagon  Injectable 1 milliGRAM(s) IntraMuscular once  heparin  Infusion. 1000 Unit(s)/Hr (10 mL/Hr) IV Continuous <Continuous>  influenza   Vaccine 0.5 milliLiter(s) IntraMuscular once  insulin lispro (ADMELOG) corrective regimen sliding scale   SubCutaneous three times a day before meals  losartan 12.5 milliGRAM(s) Oral daily  magnesium sulfate  IVPB 2 Gram(s) IV Intermittent once  melatonin 3 milliGRAM(s) Oral at bedtime  milrinone Infusion 0.25 MICROgram(s)/kG/Min (5.45 mL/Hr) IV Continuous <Continuous>  nicotine -  14 mG/24Hr(s) Patch 1 patch Transdermal daily  spironolactone 25 milliGRAM(s) Oral daily  tiotropium 18 MICROgram(s) Capsule 1 Capsule(s) Inhalation daily    MEDICATIONS  (PRN):  acetaminophen     Tablet .. 650 milliGRAM(s) Oral every 6 hours PRN Temp greater or equal to 38C (100.4F), Mild Pain (1 - 3)  aluminum hydroxide/magnesium hydroxide/simethicone Suspension 30 milliLiter(s) Oral every 4 hours PRN Dyspepsia  heparin   Injectable 6000 Unit(s) IV Push every 6 hours PRN For aPTT less than 40  heparin   Injectable 3000 Unit(s) IV Push every 6 hours PRN For aPTT between 40 - 57  melatonin 3 milliGRAM(s) Oral at bedtime PRN Insomnia  ondansetron Injectable 4 milliGRAM(s) IV Push every 8 hours PRN Nausea and/or Vomiting  traMADol 50 milliGRAM(s) Oral every 4 hours PRN Severe Pain (7 - 10)      RADIOLOGY & ADDITIONAL TESTS:

## 2021-11-29 NOTE — PHYSICAL THERAPY INITIAL EVALUATION ADULT - PERTINENT HX OF CURRENT PROBLEM, REHAB EVAL
60yo M w/ PMHx of CAD, Nonischemic Cardiomyopathy - CHF (EF in 2016 15-20%) s/p ICD (reports placed about 5 months ago by Dr. Shaikh),and  HTN admitted after his ICD fired multiple times, worsening dyspnea ,found to have rapid atrial f;lutter  pt is seen by cardiology and EPS , treated with iv lasix , oxygen ., medication adjusted , pt is with worsening renal disease , fluid overload Rate uncontrolled , VANESSA CV performed , cont anticoagulation v heparin

## 2021-11-29 NOTE — PROGRESS NOTE ADULT - SUBJECTIVE AND OBJECTIVE BOX
NEPHROLOGY INTERVAL HPI/OVERNIGHT EVENTS:  pt remains comfortable  no adverse overnight events noted    MEDICATIONS  (STANDING):  ALBUTerol    90 MICROgram(s) HFA Inhaler 2 Puff(s) Inhalation every 6 hours  albuterol/ipratropium for Nebulization 3 milliLiter(s) Nebulizer every 6 hours  aMIOdarone    Tablet 200 milliGRAM(s) Oral daily  aspirin enteric coated 81 milliGRAM(s) Oral daily  atorvastatin 40 milliGRAM(s) Oral at bedtime  bisoprolol   Tablet 5 milliGRAM(s) Oral daily  budesonide 160 MICROgram(s)/formoterol 4.5 MICROgram(s) Inhaler 2 Puff(s) Inhalation two times a day  buMETAnide Infusion 0.5 mG/Hr (2.5 mL/Hr) IV Continuous <Continuous>  dextrose 40% Gel 15 Gram(s) Oral once  dextrose 5%. 1000 milliLiter(s) (100 mL/Hr) IV Continuous <Continuous>  dextrose 5%. 1000 milliLiter(s) (50 mL/Hr) IV Continuous <Continuous>  dextrose 50% Injectable 25 Gram(s) IV Push once  dextrose 50% Injectable 12.5 Gram(s) IV Push once  dextrose 50% Injectable 25 Gram(s) IV Push once  glucagon  Injectable 1 milliGRAM(s) IntraMuscular once  heparin  Infusion. 1000 Unit(s)/Hr (10 mL/Hr) IV Continuous <Continuous>  influenza   Vaccine 0.5 milliLiter(s) IntraMuscular once  insulin lispro (ADMELOG) corrective regimen sliding scale   SubCutaneous three times a day before meals  losartan 12.5 milliGRAM(s) Oral daily  magnesium sulfate  IVPB 2 Gram(s) IV Intermittent once  melatonin 3 milliGRAM(s) Oral at bedtime  milrinone Infusion 0.25 MICROgram(s)/kG/Min (5.45 mL/Hr) IV Continuous <Continuous>  nicotine -  14 mG/24Hr(s) Patch 1 patch Transdermal daily  spironolactone 25 milliGRAM(s) Oral daily  tiotropium 18 MICROgram(s) Capsule 1 Capsule(s) Inhalation daily    MEDICATIONS  (PRN):  acetaminophen     Tablet .. 650 milliGRAM(s) Oral every 6 hours PRN Temp greater or equal to 38C (100.4F), Mild Pain (1 - 3)  aluminum hydroxide/magnesium hydroxide/simethicone Suspension 30 milliLiter(s) Oral every 4 hours PRN Dyspepsia  heparin   Injectable 6000 Unit(s) IV Push every 6 hours PRN For aPTT less than 40  heparin   Injectable 3000 Unit(s) IV Push every 6 hours PRN For aPTT between 40 - 57  melatonin 3 milliGRAM(s) Oral at bedtime PRN Insomnia  ondansetron Injectable 4 milliGRAM(s) IV Push every 8 hours PRN Nausea and/or Vomiting  traMADol 50 milliGRAM(s) Oral every 4 hours PRN Severe Pain (7 - 10)      Allergies    No Known Allergies    Intolerances          Vital Signs Last 24 Hrs  T(C): 36.4 (29 Nov 2021 06:12), Max: 37.2 (28 Nov 2021 19:36)  T(F): 97.6 (29 Nov 2021 06:12), Max: 99 (28 Nov 2021 19:36)  HR: 55 (29 Nov 2021 06:12) (55 - 83)  BP: 100/65 (29 Nov 2021 06:12) (95/64 - 106/62)  BP(mean): --  RR: 18 (29 Nov 2021 06:12) (16 - 18)  SpO2: 99% (29 Nov 2021 06:12) (92% - 99%)    PHYSICAL EXAM:  GENERAL: Enervated  NECK: Supple, No JVD  NERVOUS SYSTEM:  A/O x3, non focal  CHEST: Diminished BS noted  HEART:  RRR, no rub  ABDOMEN: Soft, + distension +BS  EXTREMITIES: + dependent edema better    LABS:                        15.3   4.47  )-----------( 131      ( 28 Nov 2021 08:24 )             45.7     11-28    131<L>  |  90<L>  |  44.9<H>  ----------------------------<  119<H>  4.3   |  28.0  |  1.36<H>    Ca    9.2      28 Nov 2021 08:21  Phos  2.0     11-27  Mg     1.9     11-27      PTT - ( 29 Nov 2021 00:16 )  PTT:90.6 sec        RADIOLOGY & ADDITIONAL TESTS:

## 2021-11-29 NOTE — CHART NOTE - NSCHARTNOTEFT_GEN_A_CORE
RN called in regard to pt refusing meds. He is A&Ox4 and is aware of the consequences of refusing his meds. RN called in regard to pt refusing meds and routine bp monitoring. He is A&Ox4 and is aware of the consequences of refusing his meds.

## 2021-11-29 NOTE — PROGRESS NOTE ADULT - ASSESSMENT
62 y/o with h/o chronic systolic HF ACC/AHA stage C, NICMP LVEF <20% LVIDd 6.96cm, CAD, s/p ICD, HTN, active tobacco use, ?COPD who p/w inappropriate shocks in setting of new onset aflutter with RVR. He was also found to be in anasarca with ascites. Was started on low dose diuretics with no significant diuresis and worsening creatinine. Patient states he take shis medictaions regularly. Unfortunately, he's had dietary indiscretion as [place] from shelter to shelter. This is his 3rd HF related hospitalization in the past 12 months. He was last admitted to an OSH with CHF exac in October and he was discharged on 11/3.   He was started on milrinone gtt and bumex gtt with excellent response. He also underwent successful VANESSA/DCCV on 11/22 and i on amiodarone for rhythm control. Will introduce GDMT as tolerated.     Cards: Alison ([facility])    Pertinent Cardiac Studies  11/18/21 EKG Aflutter LAD. PRWP. NS T wave changes  11/20/21 LVEF <20% LVIDd 6.96cm VTI 5cm, RV severely enlarged with severely reduced systolic HF, sev biatrial enlargement, mod-sev MR, moderate TR, RVSP 38mmHg  11/22/21 VANESSA limited studye, LVEF <20%, no FADUMO thrombus  2016 [facility] LM minor irreg, mid LAD 50%, LCx minor irreg, RCA prox 60% mid 85%

## 2021-11-29 NOTE — CHART NOTE - NSCHARTNOTEFT_GEN_A_CORE
Source: Patient [ ]  Family [ ]   other [x ]EMR    Current Diet: dash, Consistent CHO  r:     PO intake:  < 50% [ ]   50-75%  [x ]   %  [ ]  other :    Source for PO intake [ ] Patient [ ] family [x ] chart [ ] staff [ ] other    Enteral /Parenteral Nutrition:     Current Weight: 160# 11/19  no edema    % Weight Change     Pertinent Medications: MEDICATIONS  (STANDING):  ALBUTerol    90 MICROgram(s) HFA Inhaler 2 Puff(s) Inhalation every 6 hours  aMIOdarone    Tablet 200 milliGRAM(s) Oral daily  aspirin enteric coated 81 milliGRAM(s) Oral daily  atorvastatin 40 milliGRAM(s) Oral at bedtime  bisoprolol   Tablet 5 milliGRAM(s) Oral daily  budesonide 160 MICROgram(s)/formoterol 4.5 MICROgram(s) Inhaler 2 Puff(s) Inhalation two times a day  buMETAnide Infusion 0.5 mG/Hr (2.5 mL/Hr) IV Continuous <Continuous>  dextrose 40% Gel 15 Gram(s) Oral once  dextrose 5%. 1000 milliLiter(s) (100 mL/Hr) IV Continuous <Continuous>  dextrose 5%. 1000 milliLiter(s) (50 mL/Hr) IV Continuous <Continuous>  dextrose 50% Injectable 25 Gram(s) IV Push once  dextrose 50% Injectable 12.5 Gram(s) IV Push once  dextrose 50% Injectable 25 Gram(s) IV Push once  glucagon  Injectable 1 milliGRAM(s) IntraMuscular once  heparin  Infusion. 1000 Unit(s)/Hr (10 mL/Hr) IV Continuous <Continuous>  influenza   Vaccine 0.5 milliLiter(s) IntraMuscular once  insulin lispro (ADMELOG) corrective regimen sliding scale   SubCutaneous three times a day before meals  losartan 12.5 milliGRAM(s) Oral daily  losartan 25 milliGRAM(s) Oral daily  magnesium sulfate  IVPB 2 Gram(s) IV Intermittent once  melatonin 3 milliGRAM(s) Oral at bedtime  milrinone Infusion 0.25 MICROgram(s)/kG/Min (5.45 mL/Hr) IV Continuous <Continuous>  nicotine -  14 mG/24Hr(s) Patch 1 patch Transdermal daily  spironolactone 25 milliGRAM(s) Oral daily  tiotropium 18 MICROgram(s) Capsule 1 Capsule(s) Inhalation daily    MEDICATIONS  (PRN):  acetaminophen     Tablet .. 650 milliGRAM(s) Oral every 6 hours PRN Temp greater or equal to 38C (100.4F), Mild Pain (1 - 3)  aluminum hydroxide/magnesium hydroxide/simethicone Suspension 30 milliLiter(s) Oral every 4 hours PRN Dyspepsia  heparin   Injectable 6000 Unit(s) IV Push every 6 hours PRN For aPTT less than 40  heparin   Injectable 3000 Unit(s) IV Push every 6 hours PRN For aPTT between 40 - 57  melatonin 3 milliGRAM(s) Oral at bedtime PRN Insomnia  ondansetron Injectable 4 milliGRAM(s) IV Push every 8 hours PRN Nausea and/or Vomiting  traMADol 50 milliGRAM(s) Oral every 4 hours PRN Severe Pain (7 - 10)    Pertinent Labs: CBC Full  -  ( 29 Nov 2021 08:08 )  WBC Count : 5.37 K/uL  RBC Count : 5.18 M/uL  Hemoglobin : 15.1 g/dL  Hematocrit : 45.9 %  Platelet Count - Automated : 149 K/uL  Mean Cell Volume : 88.6 fl  Mean Cell Hemoglobin : 29.2 pg  Mean Cell Hemoglobin Concentration : 32.9 gm/dL  Auto Neutrophil # : x  Auto Lymphocyte # : x  Auto Monocyte # : x  Auto Eosinophil # : x  Auto Basophil # : x  Auto Neutrophil % : x  Auto Lymphocyte % : x  Auto Monocyte % : x  Auto Eosinophil % : x  Auto Basophil % : x      11-29 Na134 mmol/L<L> Glu 132 mg/dL<H> K+ 3.9 mmol/L Cr  1.63 mg/dL<H> BUN 46.2 mg/dL<H> Phos n/a   Alb n/a   PAB n/a           Skin:     Nutrition focused physical exam conducted - found signs of malnutrition [ ]absent [x ]present    Subcutaneous fat loss: [ ] Orbital fat pads region, [ ]Buccal fat region, [x ]Triceps region,  [ x]Ribs region    Muscle wasting: [ x]Temples region, [x ]Clavicle region, [x ]Shoulder region, [ ]Scapula region, [ ]Interosseous region,  [ x]thigh region, [x ]Calf region    Estimated Needs:   [ x] no change since previous assessment  [ ] recalculated:     Current Nutrition Diagnosis: Malnutrition Mod chronic related to inadequate energy intake in setting of CHF, ICD discharge as evidenced by mild to mod fat/muscle depletion, 15# (8.5%) weight loss x 3 mo. Pt from a shelter, PT states plan is home.       Recommendations: Glucerna shake TID    Monitoring and Evaluation:   [x ] PO intake [x ] Tolerance to diet prescription [X] Weights  [X] Follow up per protocol [X] Labs:

## 2021-11-29 NOTE — PHYSICAL THERAPY INITIAL EVALUATION ADULT - LEVEL OF INDEPENDENCE, REHAB EVAL
independent
Ears: no ear pain and no hearing problems.Nose: no nasal congestion and no nasal drainage.Mouth/Throat: no dysphagia, no hoarseness and no throat pain.Neck: no lumps, no pain, no stiffness and no swollen glands.

## 2021-11-30 LAB
ANION GAP SERPL CALC-SCNC: 15 MMOL/L — SIGNIFICANT CHANGE UP (ref 5–17)
APTT BLD: 63.2 SEC — HIGH (ref 27.5–35.5)
BUN SERPL-MCNC: 46.5 MG/DL — HIGH (ref 8–20)
CALCIUM SERPL-MCNC: 8.4 MG/DL — LOW (ref 8.6–10.2)
CHLORIDE SERPL-SCNC: 91 MMOL/L — LOW (ref 98–107)
CO2 SERPL-SCNC: 27 MMOL/L — SIGNIFICANT CHANGE UP (ref 22–29)
CREAT SERPL-MCNC: 1.63 MG/DL — HIGH (ref 0.5–1.3)
GLUCOSE BLDC GLUCOMTR-MCNC: 132 MG/DL — HIGH (ref 70–99)
GLUCOSE BLDC GLUCOMTR-MCNC: 90 MG/DL — SIGNIFICANT CHANGE UP (ref 70–99)
GLUCOSE BLDC GLUCOMTR-MCNC: 92 MG/DL — SIGNIFICANT CHANGE UP (ref 70–99)
GLUCOSE SERPL-MCNC: 136 MG/DL — HIGH (ref 70–99)
HCT VFR BLD CALC: 42.8 % — SIGNIFICANT CHANGE UP (ref 39–50)
HGB BLD-MCNC: 14.4 G/DL — SIGNIFICANT CHANGE UP (ref 13–17)
LACTATE BLDV-MCNC: 1.8 MMOL/L — SIGNIFICANT CHANGE UP (ref 0.5–2)
MAGNESIUM SERPL-MCNC: 1.6 MG/DL — SIGNIFICANT CHANGE UP (ref 1.6–2.6)
MCHC RBC-ENTMCNC: 29.3 PG — SIGNIFICANT CHANGE UP (ref 27–34)
MCHC RBC-ENTMCNC: 33.6 GM/DL — SIGNIFICANT CHANGE UP (ref 32–36)
MCV RBC AUTO: 87.2 FL — SIGNIFICANT CHANGE UP (ref 80–100)
PHOSPHATE SERPL-MCNC: 3.2 MG/DL — SIGNIFICANT CHANGE UP (ref 2.4–4.7)
PLATELET # BLD AUTO: 145 K/UL — LOW (ref 150–400)
POTASSIUM SERPL-MCNC: 3.7 MMOL/L — SIGNIFICANT CHANGE UP (ref 3.5–5.3)
POTASSIUM SERPL-SCNC: 3.7 MMOL/L — SIGNIFICANT CHANGE UP (ref 3.5–5.3)
RBC # BLD: 4.91 M/UL — SIGNIFICANT CHANGE UP (ref 4.2–5.8)
RBC # FLD: 15.7 % — HIGH (ref 10.3–14.5)
SARS-COV-2 RNA SPEC QL NAA+PROBE: SIGNIFICANT CHANGE UP
SODIUM SERPL-SCNC: 133 MMOL/L — LOW (ref 135–145)
WBC # BLD: 5.1 K/UL — SIGNIFICANT CHANGE UP (ref 3.8–10.5)
WBC # FLD AUTO: 5.1 K/UL — SIGNIFICANT CHANGE UP (ref 3.8–10.5)

## 2021-11-30 PROCEDURE — 99233 SBSQ HOSP IP/OBS HIGH 50: CPT

## 2021-11-30 RX ORDER — MILRINONE LACTATE 1 MG/ML
0.12 INJECTION, SOLUTION INTRAVENOUS
Qty: 20 | Refills: 0 | Status: DISCONTINUED | OUTPATIENT
Start: 2021-11-30 | End: 2021-12-01

## 2021-11-30 RX ORDER — MAGNESIUM SULFATE 500 MG/ML
2 VIAL (ML) INJECTION ONCE
Refills: 0 | Status: COMPLETED | OUTPATIENT
Start: 2021-11-30 | End: 2021-11-30

## 2021-11-30 RX ORDER — MAGNESIUM SULFATE 500 MG/ML
2 VIAL (ML) INJECTION DAILY
Refills: 0 | Status: DISCONTINUED | OUTPATIENT
Start: 2021-11-30 | End: 2021-11-30

## 2021-11-30 RX ORDER — BUMETANIDE 0.25 MG/ML
4 INJECTION INTRAMUSCULAR; INTRAVENOUS
Refills: 0 | Status: DISCONTINUED | OUTPATIENT
Start: 2021-11-30 | End: 2021-12-02

## 2021-11-30 RX ADMIN — MILRINONE LACTATE 5.45 MICROGRAM(S)/KG/MIN: 1 INJECTION, SOLUTION INTRAVENOUS at 06:50

## 2021-11-30 RX ADMIN — ALBUTEROL 2 PUFF(S): 90 AEROSOL, METERED ORAL at 14:40

## 2021-11-30 RX ADMIN — Medication 3 MILLIGRAM(S): at 23:05

## 2021-11-30 RX ADMIN — BUMETANIDE 4 MILLIGRAM(S): 0.25 INJECTION INTRAMUSCULAR; INTRAVENOUS at 17:34

## 2021-11-30 RX ADMIN — Medication 50 GRAM(S): at 17:33

## 2021-11-30 RX ADMIN — MILRINONE LACTATE 2.72 MICROGRAM(S)/KG/MIN: 1 INJECTION, SOLUTION INTRAVENOUS at 11:52

## 2021-11-30 RX ADMIN — ALBUTEROL 2 PUFF(S): 90 AEROSOL, METERED ORAL at 08:16

## 2021-11-30 RX ADMIN — ATORVASTATIN CALCIUM 40 MILLIGRAM(S): 80 TABLET, FILM COATED ORAL at 23:04

## 2021-11-30 RX ADMIN — HEPARIN SODIUM 1000 UNIT(S)/HR: 5000 INJECTION INTRAVENOUS; SUBCUTANEOUS at 12:00

## 2021-11-30 RX ADMIN — Medication 81 MILLIGRAM(S): at 08:15

## 2021-11-30 NOTE — PROGRESS NOTE ADULT - SUBJECTIVE AND OBJECTIVE BOX
NEPHROLOGY INTERVAL HPI/OVERNIGHT EVENTS:  pt comfortable  no adverse overnight events noted    MEDICATIONS  (STANDING):  ALBUTerol    90 MICROgram(s) HFA Inhaler 2 Puff(s) Inhalation every 6 hours  aMIOdarone    Tablet 200 milliGRAM(s) Oral daily  aspirin enteric coated 81 milliGRAM(s) Oral daily  atorvastatin 40 milliGRAM(s) Oral at bedtime  bisoprolol   Tablet 5 milliGRAM(s) Oral daily  budesonide 160 MICROgram(s)/formoterol 4.5 MICROgram(s) Inhaler 2 Puff(s) Inhalation two times a day  buMETAnide Infusion 0.5 mG/Hr (2.5 mL/Hr) IV Continuous <Continuous>  dextrose 40% Gel 15 Gram(s) Oral once  dextrose 5%. 1000 milliLiter(s) (50 mL/Hr) IV Continuous <Continuous>  dextrose 5%. 1000 milliLiter(s) (100 mL/Hr) IV Continuous <Continuous>  dextrose 50% Injectable 25 Gram(s) IV Push once  dextrose 50% Injectable 12.5 Gram(s) IV Push once  dextrose 50% Injectable 25 Gram(s) IV Push once  glucagon  Injectable 1 milliGRAM(s) IntraMuscular once  heparin  Infusion. 1000 Unit(s)/Hr (10 mL/Hr) IV Continuous <Continuous>  influenza   Vaccine 0.5 milliLiter(s) IntraMuscular once  insulin lispro (ADMELOG) corrective regimen sliding scale   SubCutaneous three times a day before meals  losartan 12.5 milliGRAM(s) Oral daily  magnesium sulfate  IVPB 2 Gram(s) IV Intermittent once  melatonin 3 milliGRAM(s) Oral at bedtime  milrinone Infusion 0.25 MICROgram(s)/kG/Min (5.45 mL/Hr) IV Continuous <Continuous>  nicotine -  14 mG/24Hr(s) Patch 1 patch Transdermal daily  spironolactone 25 milliGRAM(s) Oral daily  tiotropium 18 MICROgram(s) Capsule 1 Capsule(s) Inhalation daily    MEDICATIONS  (PRN):  acetaminophen     Tablet .. 650 milliGRAM(s) Oral every 6 hours PRN Temp greater or equal to 38C (100.4F), Mild Pain (1 - 3)  aluminum hydroxide/magnesium hydroxide/simethicone Suspension 30 milliLiter(s) Oral every 4 hours PRN Dyspepsia  heparin   Injectable 6000 Unit(s) IV Push every 6 hours PRN For aPTT less than 40  heparin   Injectable 3000 Unit(s) IV Push every 6 hours PRN For aPTT between 40 - 57  melatonin 3 milliGRAM(s) Oral at bedtime PRN Insomnia  ondansetron Injectable 4 milliGRAM(s) IV Push every 8 hours PRN Nausea and/or Vomiting  traMADol 50 milliGRAM(s) Oral every 4 hours PRN Severe Pain (7 - 10)      Allergies    No Known Allergies    Intolerances          Vital Signs Last 24 Hrs  T(C): 36.9 (30 Nov 2021 05:06), Max: 37.2 (29 Nov 2021 17:47)  T(F): 98.5 (30 Nov 2021 05:06), Max: 99 (29 Nov 2021 17:47)  HR: 75 (30 Nov 2021 05:06) (75 - 78)  BP: 93/61 (30 Nov 2021 05:06) (93/61 - 106/70)  BP(mean): --  RR: 18 (30 Nov 2021 05:06) (18 - 20)  SpO2: 91% (30 Nov 2021 05:06) (91% - 96%)    PHYSICAL EXAM:  GENERAL: Comfortable  NECK: Supple, No JVD  NERVOUS SYSTEM:  A/O x3, non focal  CHEST: Clear bilaterally  HEART:  RRR, no rub  ABDOMEN: Soft, + distension +BS  EXTREMITIES: + LE edema improved    LABS:                        15.1   5.37  )-----------( 149      ( 29 Nov 2021 08:08 )             45.9     11-29    134<L>  |  90<L>  |  46.2<H>  ----------------------------<  132<H>  3.9   |  28.0  |  1.63<H>        Ca    9.3      29 Nov 2021 08:08      PTT - ( 29 Nov 2021 22:52 )  PTT:76.4 sec        RADIOLOGY & ADDITIONAL TESTS:

## 2021-11-30 NOTE — PROGRESS NOTE ADULT - ASSESSMENT
60 y/o with h/o chronic systolic HF ACC/AHA stage C, NICMP LVEF <20% LVIDd 6.96cm, CAD, s/p ICD, HTN, active tobacco use, ?COPD who p/w inappropriate shocks in setting of new onset aflutter with RVR. He was also found to be in anasarca with ascites. Was started on low dose diuretics with no significant diuresis and worsening creatinine. Patient states he take shis medictaions regularly. Unfortunately, he's had dietary indiscretion as he lives from shelter to shelter. This is his 3rd HF related hospitalization in the past 12 months. He was last admitted to an OSH with CHF exac in October and he was discharged on 11/3.   He was started on milrinone gtt and bumex gtt with excellent response. He also underwent successful VANESSA/DCCV on 11/22 and i on amiodarone for rhythm control. Will introduce GDMT as tolerated.     Cards: Alison ([facility])    Pertinent Cardiac Studies  11/18/21 EKG Aflutter LAD. PRWP. NS T wave changes  11/20/21 LVEF <20% LVIDd 6.96cm VTI 5cm, RV severely enlarged with severely reduced systolic HF, sev biatrial enlargement, mod-sev MR, moderate TR, RVSP 38mmHg  11/22/21 VANESSA limited studye, LVEF <20%, no FADUMO thrombus  2016 [facility] LM minor irreg, mid LAD 50%, LCx minor irreg, RCA prox 60% mid 85%

## 2021-11-30 NOTE — PROGRESS NOTE ADULT - ASSESSMENT
DERRICK: improved since admission  CM (EF < 20%), decompensated CHF---> superimposed prerenal component due to diuretics; further renal hypoperfusion from ARB  No hydronephrosis seen on CT scan  - cont to avoid potential nephrotoxins  - cont Bumex infusion, inotrope, aldactone as per cardiology  - monitoring K+ and Screat closely on Aldactone and ARB  - will need to accept a significant degree of azotemia to keep CHF compensated    RO: hypophosphatemia  - monitor off Phoslo

## 2021-11-30 NOTE — PROGRESS NOTE ADULT - ASSESSMENT
62yo M w/ PMHx of CAD, Nonischemic Cardiomyopathy - CHF (EF in 2016 15-20%) s/p ICD (reports placed about 5 months ago by Dr. Shaikh),and  HTN admitted after his ICD fired multiple times, worsening dyspnea ,found to have rapid atrial f;lutter  pt is seen by cardiology and EPS , treated with iv lasix , oxygen ., medication adjusted , pt is with worsening renal disease , fluid overload Rate uncontrolled , VANESSA CV performed , cont anticoagulation v heparin     #Acute on cronic systolic heart failure with edema, ascites; acute hypoxic resp failure present on admission is now improved (off O2)  - on Milrinone drip , rate controlled   - moderate ascites - paracentesis was deferred since couldn't stop AC - appears to be improving with diuresis  - cardiology / HF team follow up   - Bumex and milrinone gtt 11/24, tolerate well  - changed bumex to 4mg BID  - decreased milrinone drip as well  - daily BMP  - replace mangesium today  - c/w Spironolactone  - losartan 12.5mg daily     #ARF / hyperkalemia likely secondary to cardiorenal syndrome   - cr stable today  - nephrology follow up     #Atrial flutter with high rate , new onset   - s/p VANESSA cardioversion   - NSR controlled , cont amino, bystolic , anticoagulation iv heparin  - eventual change to DOAC    #Acute hypoxic resp failure   - likely  multifactorial CHF , A flutter , underlying lung disease ?COPD ,  active smoker   - continue to monitor for now    #ICD discharge - inappropriate shocks as per EP; adjusted  - cont to follow   - stable     #CAD , non obstructive   - cont aspirin statin   - CP atypical resolved   - troponin neg     #Controlled Diabetes Mellitus , new ?   - hba1c 6.3   - cont diet , diabetic education   - ISS     #Nicotine use disorder   - nicotine patch   - smoking cessation provided     #Substance use   - urine tox cocaine and THC +   - pt aware not use cocaine on BB     #Moderate protein spencer malnutrition   - cont diet   - cont diet      DVT ppx on iv heparin, likely transition to PO at time of discharge pending renal function   Cont Milrinone drip; HF attending consulting

## 2021-11-30 NOTE — PROGRESS NOTE ADULT - SUBJECTIVE AND OBJECTIVE BOX
Interval History: No acute events    Medications:  acetaminophen     Tablet .. 650 milliGRAM(s) Oral every 6 hours PRN  ALBUTerol    90 MICROgram(s) HFA Inhaler 2 Puff(s) Inhalation every 6 hours  aluminum hydroxide/magnesium hydroxide/simethicone Suspension 30 milliLiter(s) Oral every 4 hours PRN  aMIOdarone    Tablet 200 milliGRAM(s) Oral daily  aspirin enteric coated 81 milliGRAM(s) Oral daily  atorvastatin 40 milliGRAM(s) Oral at bedtime  bisoprolol   Tablet 5 milliGRAM(s) Oral daily  budesonide 160 MICROgram(s)/formoterol 4.5 MICROgram(s) Inhaler 2 Puff(s) Inhalation two times a day  buMETAnide 4 milliGRAM(s) Oral two times a day  dextrose 40% Gel 15 Gram(s) Oral once  dextrose 5%. 1000 milliLiter(s) IV Continuous <Continuous>  dextrose 5%. 1000 milliLiter(s) IV Continuous <Continuous>  dextrose 50% Injectable 25 Gram(s) IV Push once  dextrose 50% Injectable 12.5 Gram(s) IV Push once  dextrose 50% Injectable 25 Gram(s) IV Push once  glucagon  Injectable 1 milliGRAM(s) IntraMuscular once  heparin   Injectable 6000 Unit(s) IV Push every 6 hours PRN  heparin   Injectable 3000 Unit(s) IV Push every 6 hours PRN  heparin  Infusion. 1000 Unit(s)/Hr IV Continuous <Continuous>  influenza   Vaccine 0.5 milliLiter(s) IntraMuscular once  insulin lispro (ADMELOG) corrective regimen sliding scale   SubCutaneous three times a day before meals  losartan 12.5 milliGRAM(s) Oral daily  magnesium sulfate  IVPB 2 Gram(s) IV Intermittent once  magnesium sulfate  IVPB 2 Gram(s) IV Intermittent daily  melatonin 3 milliGRAM(s) Oral at bedtime  melatonin 3 milliGRAM(s) Oral at bedtime PRN  milrinone Infusion 0.125 MICROgram(s)/kG/Min IV Continuous <Continuous>  nicotine -  14 mG/24Hr(s) Patch 1 patch Transdermal daily  ondansetron Injectable 4 milliGRAM(s) IV Push every 8 hours PRN  spironolactone 25 milliGRAM(s) Oral daily  tiotropium 18 MICROgram(s) Capsule 1 Capsule(s) Inhalation daily  traMADol 50 milliGRAM(s) Oral every 4 hours PRN      Vitals:  T(C): 37.2 (11-30-21 @ 11:09), Max: 37.2 (11-29-21 @ 17:47)  HR: 81 (11-30-21 @ 11:09) (75 - 81)  BP: 88/52 (11-30-21 @ 13:19) (83/50 - 102/69)  BP(mean): --  RR: 18 (11-30-21 @ 11:09) (18 - 18)  SpO2: 90% (11-30-21 @ 11:09) (90% - 96%)    Daily     Daily         I&O's Summary    29 Nov 2021 07:01  -  30 Nov 2021 07:00  --------------------------------------------------------  IN: 0 mL / OUT: 1400 mL / NET: -1400 mL        Physical Exam:  Appearance: No Acute Distress  HEENT: PERRL  Neck: No JVD  Cardiovascular: Normal S1 S2, No murmurs/rubs/gallops  Respiratory: Clear to auscultation bilaterally  Gastrointestinal: Soft, Non-tender	  Skin: No cyanosis	  Neurologic: Non-focal  Extremities: No LE edema  Psychiatry: A & O x 3, Mood & affect appropriate    Labs:                        14.4   5.10  )-----------( 145      ( 30 Nov 2021 07:38 )             42.8     11-30    133<L>  |  91<L>  |  46.5<H>  ----------------------------<  136<H>  3.7   |  27.0  |  1.63<H>    Ca    8.4<L>      30 Nov 2021 07:38  Phos  3.2     11-30  Mg     1.6     11-30      PTT - ( 30 Nov 2021 07:38 )  PTT:63.2 sec

## 2021-11-30 NOTE — PROGRESS NOTE ADULT - SUBJECTIVE AND OBJECTIVE BOX
Has Your Skin Lesion Been Treated?: not been treated Is This A New Presentation, Or A Follow-Up?: Skin Lesion CC: Jhonny, AICD firing (29 Nov 2021 06:49)    INTERVAL HPI/OVERNIGHT EVENTS:  no acute events overnight  without acute complaints    Vital Signs Last 24 Hrs  T(C): 37.2 (30 Nov 2021 11:09), Max: 37.2 (29 Nov 2021 17:47)  T(F): 99 (30 Nov 2021 11:09), Max: 99 (29 Nov 2021 17:47)  HR: 81 (30 Nov 2021 11:09) (75 - 81)  BP: 83/50 (30 Nov 2021 11:09) (83/50 - 106/70)  BP(mean): --  RR: 18 (30 Nov 2021 11:09) (18 - 20)  SpO2: 90% (30 Nov 2021 11:09) (90% - 96%)    PHYSICAL EXAM:  General: in no acute distress  Eyes: PERRLA, EOMI; conjunctiva and sclera clear  Head: Normocephalic; atraumatic  ENMT: No nasal discharge; airway clear  Neck: Supple; non tender; no masses  Respiratory: No wheezes, rales or rhonchi  Cardiovascular: Regular rate and rhythm. S1 and S2 Normal; No murmurs, gallops or rubs  Gastrointestinal: Soft non-tender non-distended; Normal bowel sounds  Genitourinary: No costovertebral angle tenderness  Extremities: Normal range of motion, No clubbing, cyanosis; pitting edema in the lower ext appears improved  Neurological: Alert and oriented x4  Skin: Warm and dry. No acute rash  Psychiatric: Cooperative and appropriate    I&O's Detail    28 Nov 2021 07:01  -  29 Nov 2021 07:00  --------------------------------------------------------  IN:    Bumetanide: 32.5 mL    Heparin Infusion: 134 mL    Milrinone: 75.4 mL    Oral Fluid: 120 mL  Total IN: 361.9 mL    OUT:    Voided (mL): 3000 mL  Total OUT: 3000 mL    Total NET: -2638.1 mL                                   14.4   5.10  )-----------( 145      ( 30 Nov 2021 07:38 )             42.8     11-30    133<L>  |  91<L>  |  46.5<H>  ----------------------------<  136<H>  3.7   |  27.0  |  1.63<H>    Ca    8.4<L>      30 Nov 2021 07:38  Phos  3.2     11-30  Mg     1.6     11-30    MEDICATIONS  (STANDING):  ALBUTerol    90 MICROgram(s) HFA Inhaler 2 Puff(s) Inhalation every 6 hours  aMIOdarone    Tablet 200 milliGRAM(s) Oral daily  aspirin enteric coated 81 milliGRAM(s) Oral daily  atorvastatin 40 milliGRAM(s) Oral at bedtime  bisoprolol   Tablet 5 milliGRAM(s) Oral daily  budesonide 160 MICROgram(s)/formoterol 4.5 MICROgram(s) Inhaler 2 Puff(s) Inhalation two times a day  buMETAnide Infusion 0.5 mG/Hr (2.5 mL/Hr) IV Continuous <Continuous>  dextrose 40% Gel 15 Gram(s) Oral once  dextrose 5%. 1000 milliLiter(s) (100 mL/Hr) IV Continuous <Continuous>  dextrose 5%. 1000 milliLiter(s) (50 mL/Hr) IV Continuous <Continuous>  dextrose 50% Injectable 25 Gram(s) IV Push once  dextrose 50% Injectable 12.5 Gram(s) IV Push once  dextrose 50% Injectable 25 Gram(s) IV Push once  glucagon  Injectable 1 milliGRAM(s) IntraMuscular once  heparin  Infusion. 1000 Unit(s)/Hr (10 mL/Hr) IV Continuous <Continuous>  influenza   Vaccine 0.5 milliLiter(s) IntraMuscular once  insulin lispro (ADMELOG) corrective regimen sliding scale   SubCutaneous three times a day before meals  losartan 12.5 milliGRAM(s) Oral daily  magnesium sulfate  IVPB 2 Gram(s) IV Intermittent once  melatonin 3 milliGRAM(s) Oral at bedtime  milrinone Infusion 0.25 MICROgram(s)/kG/Min (5.45 mL/Hr) IV Continuous <Continuous>  nicotine -  14 mG/24Hr(s) Patch 1 patch Transdermal daily  spironolactone 25 milliGRAM(s) Oral daily  tiotropium 18 MICROgram(s) Capsule 1 Capsule(s) Inhalation daily    MEDICATIONS  (PRN):  acetaminophen     Tablet .. 650 milliGRAM(s) Oral every 6 hours PRN Temp greater or equal to 38C (100.4F), Mild Pain (1 - 3)  aluminum hydroxide/magnesium hydroxide/simethicone Suspension 30 milliLiter(s) Oral every 4 hours PRN Dyspepsia  heparin   Injectable 6000 Unit(s) IV Push every 6 hours PRN For aPTT less than 40  heparin   Injectable 3000 Unit(s) IV Push every 6 hours PRN For aPTT between 40 - 57  melatonin 3 milliGRAM(s) Oral at bedtime PRN Insomnia  ondansetron Injectable 4 milliGRAM(s) IV Push every 8 hours PRN Nausea and/or Vomiting  traMADol 50 milliGRAM(s) Oral every 4 hours PRN Severe Pain (7 - 10)      RADIOLOGY & ADDITIONAL TESTS:

## 2021-12-01 ENCOUNTER — TRANSCRIPTION ENCOUNTER (OUTPATIENT)
Age: 61
End: 2021-12-01

## 2021-12-01 LAB
ALBUMIN SERPL ELPH-MCNC: 4 G/DL — SIGNIFICANT CHANGE UP (ref 3.3–5.2)
ALP SERPL-CCNC: 225 U/L — HIGH (ref 40–120)
ALT FLD-CCNC: 31 U/L — SIGNIFICANT CHANGE UP
ANION GAP SERPL CALC-SCNC: 15 MMOL/L — SIGNIFICANT CHANGE UP (ref 5–17)
APTT BLD: 61.5 SEC — HIGH (ref 27.5–35.5)
AST SERPL-CCNC: 41 U/L — HIGH
BASE EXCESS BLDA CALC-SCNC: 6.6 MMOL/L — HIGH (ref -2–3)
BILIRUB SERPL-MCNC: 1.3 MG/DL — SIGNIFICANT CHANGE UP (ref 0.4–2)
BLOOD GAS COMMENTS ARTERIAL: SIGNIFICANT CHANGE UP
BUN SERPL-MCNC: 48.1 MG/DL — HIGH (ref 8–20)
CALCIUM SERPL-MCNC: 8.6 MG/DL — SIGNIFICANT CHANGE UP (ref 8.6–10.2)
CHLORIDE SERPL-SCNC: 90 MMOL/L — LOW (ref 98–107)
CO2 SERPL-SCNC: 28 MMOL/L — SIGNIFICANT CHANGE UP (ref 22–29)
CREAT SERPL-MCNC: 1.5 MG/DL — HIGH (ref 0.5–1.3)
GAS PNL BLDA: SIGNIFICANT CHANGE UP
GLUCOSE BLDC GLUCOMTR-MCNC: 114 MG/DL — HIGH (ref 70–99)
GLUCOSE BLDC GLUCOMTR-MCNC: 116 MG/DL — HIGH (ref 70–99)
GLUCOSE BLDC GLUCOMTR-MCNC: 118 MG/DL — HIGH (ref 70–99)
GLUCOSE SERPL-MCNC: 129 MG/DL — HIGH (ref 70–99)
HCO3 BLDA-SCNC: 31 MMOL/L — HIGH (ref 21–28)
HCT VFR BLD CALC: 43.5 % — SIGNIFICANT CHANGE UP (ref 39–50)
HGB BLD-MCNC: 14.7 G/DL — SIGNIFICANT CHANGE UP (ref 13–17)
HOROWITZ INDEX BLDA+IHG-RTO: SIGNIFICANT CHANGE UP
MCHC RBC-ENTMCNC: 29.3 PG — SIGNIFICANT CHANGE UP (ref 27–34)
MCHC RBC-ENTMCNC: 33.8 GM/DL — SIGNIFICANT CHANGE UP (ref 32–36)
MCV RBC AUTO: 86.7 FL — SIGNIFICANT CHANGE UP (ref 80–100)
PCO2 BLDA: 44 MMHG — SIGNIFICANT CHANGE UP (ref 35–48)
PH BLDA: 7.45 — SIGNIFICANT CHANGE UP (ref 7.35–7.45)
PLATELET # BLD AUTO: 157 K/UL — SIGNIFICANT CHANGE UP (ref 150–400)
PO2 BLDA: 87 MMHG — SIGNIFICANT CHANGE UP (ref 83–108)
POTASSIUM SERPL-MCNC: 4.2 MMOL/L — SIGNIFICANT CHANGE UP (ref 3.5–5.3)
POTASSIUM SERPL-SCNC: 4.2 MMOL/L — SIGNIFICANT CHANGE UP (ref 3.5–5.3)
PROT SERPL-MCNC: 7 G/DL — SIGNIFICANT CHANGE UP (ref 6.6–8.7)
RBC # BLD: 5.02 M/UL — SIGNIFICANT CHANGE UP (ref 4.2–5.8)
RBC # FLD: 15.6 % — HIGH (ref 10.3–14.5)
SAO2 % BLDA: 97.5 % — SIGNIFICANT CHANGE UP (ref 94–98)
SODIUM SERPL-SCNC: 133 MMOL/L — LOW (ref 135–145)
WBC # BLD: 10.44 K/UL — SIGNIFICANT CHANGE UP (ref 3.8–10.5)
WBC # FLD AUTO: 10.44 K/UL — SIGNIFICANT CHANGE UP (ref 3.8–10.5)

## 2021-12-01 PROCEDURE — 93971 EXTREMITY STUDY: CPT | Mod: 26,RT

## 2021-12-01 PROCEDURE — 71045 X-RAY EXAM CHEST 1 VIEW: CPT | Mod: 26

## 2021-12-01 PROCEDURE — 99233 SBSQ HOSP IP/OBS HIGH 50: CPT

## 2021-12-01 PROCEDURE — G9005: CPT

## 2021-12-01 RX ORDER — LIDOCAINE 4 G/100G
1 CREAM TOPICAL DAILY
Refills: 0 | Status: DISCONTINUED | OUTPATIENT
Start: 2021-12-01 | End: 2021-12-02

## 2021-12-01 RX ORDER — APIXABAN 2.5 MG/1
5 TABLET, FILM COATED ORAL EVERY 12 HOURS
Refills: 0 | Status: DISCONTINUED | OUTPATIENT
Start: 2021-12-01 | End: 2021-12-02

## 2021-12-01 RX ORDER — DAPAGLIFLOZIN 10 MG/1
10 TABLET, FILM COATED ORAL DAILY
Refills: 0 | Status: DISCONTINUED | OUTPATIENT
Start: 2021-12-01 | End: 2021-12-02

## 2021-12-01 RX ADMIN — Medication 3 MILLIGRAM(S): at 21:40

## 2021-12-01 RX ADMIN — AMIODARONE HYDROCHLORIDE 200 MILLIGRAM(S): 400 TABLET ORAL at 07:00

## 2021-12-01 RX ADMIN — APIXABAN 5 MILLIGRAM(S): 2.5 TABLET, FILM COATED ORAL at 18:17

## 2021-12-01 RX ADMIN — ATORVASTATIN CALCIUM 40 MILLIGRAM(S): 80 TABLET, FILM COATED ORAL at 21:41

## 2021-12-01 RX ADMIN — BUMETANIDE 4 MILLIGRAM(S): 0.25 INJECTION INTRAMUSCULAR; INTRAVENOUS at 18:18

## 2021-12-01 RX ADMIN — ALBUTEROL 2 PUFF(S): 90 AEROSOL, METERED ORAL at 12:02

## 2021-12-01 RX ADMIN — BUMETANIDE 4 MILLIGRAM(S): 0.25 INJECTION INTRAMUSCULAR; INTRAVENOUS at 06:59

## 2021-12-01 RX ADMIN — Medication 81 MILLIGRAM(S): at 12:02

## 2021-12-01 RX ADMIN — DAPAGLIFLOZIN 10 MILLIGRAM(S): 10 TABLET, FILM COATED ORAL at 18:18

## 2021-12-01 RX ADMIN — HEPARIN SODIUM 1000 UNIT(S)/HR: 5000 INJECTION INTRAVENOUS; SUBCUTANEOUS at 05:57

## 2021-12-01 RX ADMIN — ALBUTEROL 2 PUFF(S): 90 AEROSOL, METERED ORAL at 21:50

## 2021-12-01 RX ADMIN — SPIRONOLACTONE 25 MILLIGRAM(S): 25 TABLET, FILM COATED ORAL at 07:00

## 2021-12-01 RX ADMIN — LIDOCAINE 1 PATCH: 4 CREAM TOPICAL at 21:40

## 2021-12-01 NOTE — PROGRESS NOTE ADULT - PROBLEM SELECTOR PLAN 1
NICMP with biventricular involvement LVEF <20% LVIDd 6.96cm  Clinically in anasarca and low output state  Check iron panel, may benefit from IV iron based on results  Inotropes: milrinone 0.250 mcg/kg/min  Diuretics:  bumex gtt @ 0.5mg/h. Will uptitrate as BP allows  Check BMP Q12h while on bumex gtt. Keep K > 4 and Mg > 2  GDMT: bisoprolol 5mg daily. Start aldactone 25mg daily. Will optimize as BP/kidney function allow.   Strict I/O, daily weights  Should keep milrinone/bumex gtt over the weekend. PLan to add low dose ARB and slowly downtitrate milrinone gtt. Can consider Losartan 25mg QHS starting Sunday 11/28  Not a candidate for advanced therapies due to +cocaine use and unstable housing.
NICMP with biventricular involvement LVEF <20% LVIDd 6.96cm  Clinically in anasarca and low output state  Check iron panel, may benefit from IV iron based on results  Inotropes: milrinone 0.250 mcg/kg/min, after initiation of losartan will attempt to wean to 0.125  Diuretics:  switch to bumex 4mg IV BID, appears euvolemic today  Check BMP Q12h while on bumex gtt. Keep K > 4 and Mg > 2  GDMT: c/w bisoprolol and aldactone 25mg daily. Will start losartan 12.5mg daily today, was held due to higher holding parameters yesterday, will lower BP holding parameter  Not a candidate for advanced therapies due to +cocaine use and unstable housing.
NICMP with biventricular involvement LVEF <20% LVIDd 6.96cm  Clinically in anasarca and low output state  Check iron panel, may benefit from IV iron based on results  Inotropes:  wean to 0.125  Diuretics:  switch to bumex 4mg BID, appears euvolemic today  Check BMP Q12h while on bumex gtt. Keep K > 4 and Mg > 2  GDMT: c/w bisoprolol and aldactone 25mg daily. Will start losartan 12.5mg daily today, was held due to higher holding parameters yesterday, will lower BP holding parameter  Not a candidate for advanced therapies due to +cocaine use and unstable housing.
NICMP with biventricular involvement LVEF <20% LVIDd 6.96cm  Appears euvolemic now  Inotropes:  wean off milrinone today  Diuretics:  c/w bumex 4mg BID   Keep K > 4 and Mg > 2  GDMT: c/w bisoprolol, aldactone 25mg daily, losartan 12.5mg daily  Not a candidate for advanced therapies due to +cocaine use and unstable housing.

## 2021-12-01 NOTE — PROGRESS NOTE ADULT - SUBJECTIVE AND OBJECTIVE BOX
NEPHROLOGY INTERVAL HPI/OVERNIGHT EVENTS:    No new events.    MEDICATIONS  (STANDING):  ALBUTerol    90 MICROgram(s) HFA Inhaler 2 Puff(s) Inhalation every 6 hours  aMIOdarone    Tablet 200 milliGRAM(s) Oral daily  aspirin enteric coated 81 milliGRAM(s) Oral daily  atorvastatin 40 milliGRAM(s) Oral at bedtime  bisoprolol   Tablet 5 milliGRAM(s) Oral daily  budesonide 160 MICROgram(s)/formoterol 4.5 MICROgram(s) Inhaler 2 Puff(s) Inhalation two times a day  buMETAnide Infusion 0.5 mG/Hr (2.5 mL/Hr) IV Continuous <Continuous>  dextrose 40% Gel 15 Gram(s) Oral once  dextrose 5%. 1000 milliLiter(s) (50 mL/Hr) IV Continuous <Continuous>  dextrose 5%. 1000 milliLiter(s) (100 mL/Hr) IV Continuous <Continuous>  dextrose 50% Injectable 25 Gram(s) IV Push once  dextrose 50% Injectable 12.5 Gram(s) IV Push once  dextrose 50% Injectable 25 Gram(s) IV Push once  glucagon  Injectable 1 milliGRAM(s) IntraMuscular once  heparin  Infusion. 1000 Unit(s)/Hr (10 mL/Hr) IV Continuous <Continuous>  influenza   Vaccine 0.5 milliLiter(s) IntraMuscular once  insulin lispro (ADMELOG) corrective regimen sliding scale   SubCutaneous three times a day before meals  losartan 12.5 milliGRAM(s) Oral daily  magnesium sulfate  IVPB 2 Gram(s) IV Intermittent once  melatonin 3 milliGRAM(s) Oral at bedtime  milrinone Infusion 0.25 MICROgram(s)/kG/Min (5.45 mL/Hr) IV Continuous <Continuous>  nicotine -  14 mG/24Hr(s) Patch 1 patch Transdermal daily  spironolactone 25 milliGRAM(s) Oral daily  tiotropium 18 MICROgram(s) Capsule 1 Capsule(s) Inhalation daily    MEDICATIONS  (PRN):  acetaminophen     Tablet .. 650 milliGRAM(s) Oral every 6 hours PRN Temp greater or equal to 38C (100.4F), Mild Pain (1 - 3)  aluminum hydroxide/magnesium hydroxide/simethicone Suspension 30 milliLiter(s) Oral every 4 hours PRN Dyspepsia  heparin   Injectable 6000 Unit(s) IV Push every 6 hours PRN For aPTT less than 40  heparin   Injectable 3000 Unit(s) IV Push every 6 hours PRN For aPTT between 40 - 57  melatonin 3 milliGRAM(s) Oral at bedtime PRN Insomnia  ondansetron Injectable 4 milliGRAM(s) IV Push every 8 hours PRN Nausea and/or Vomiting  traMADol 50 milliGRAM(s) Oral every 4 hours PRN Severe Pain (7 - 10)      Allergies    No Known Allergies            Vital Signs Last 24 Hrs  T(C): 36.6 (30 Nov 2021 21:35), Max: 36.9 (30 Nov 2021 13:55)  T(F): 97.8 (30 Nov 2021 21:35), Max: 98.4 (30 Nov 2021 13:55)  HR: 82 (01 Dec 2021 04:33) (62 - 82)  BP: 99/65 (01 Dec 2021 04:33) (88/52 - 113/73)  BP(mean): --  RR: 20 (01 Dec 2021 04:33) (18 - 20)  SpO2: 95% (01 Dec 2021 04:33) (94% - 95%)  T(C): 36.9 (30 Nov 2021 05:06), Max: 37.2 (29 Nov 2021 17:47)  T(F): 98.5 (30 Nov 2021 05:06), Max: 99 (29 Nov 2021 17:47)  HR: 75 (30 Nov 2021 05:06) (75 - 78)  BP: 93/61 (30 Nov 2021 05:06) (93/61 - 106/70)      PHYSICAL EXAM:      GENERAL: Comfortable  NECK: Supple, No JVD  NERVOUS SYSTEM:  awake in bed  CHEST: Clear bilaterally  HEART:  RRR, no rub  ABDOMEN: Soft, + distension +BS  EXTREMITIES: legs  same  : neg    LABS:    12-01    133<L>  |  90<L>  |  48.1<H>  ----------------------------<  129<H>  4.2   |  28.0  |  1.50<H>    Ca    8.6      01 Dec 2021 10:16  Phos  3.2     11-30  Mg     1.6     11-30    TPro  7.0  /  Alb  4.0  /  TBili  1.3  /  DBili  x   /  AST  41<H>  /  ALT  31  /  AlkPhos  225<H>  12-01                          15.1   5.37  )-----------( 149      ( 29 Nov 2021 08:08 )             45.9     11-29    134<L>  |  90<L>  |  46.2<H>  ----------------------------<  132<H>  3.9   |  28.0  |  1.63<H>        Ca    9.3      29 Nov 2021 08:08      PTT - ( 29 Nov 2021 22:52 )  PTT:76.4 sec        RADIOLOGY & ADDITIONAL TESTS:

## 2021-12-01 NOTE — PROGRESS NOTE ADULT - PROBLEM SELECTOR PLAN 4
Discussed importance of abstinence.

## 2021-12-01 NOTE — PROGRESS NOTE ADULT - PROBLEM SELECTOR PLAN 2
Currently SR. VANESSA/DCCV on 11/22.   Bisoprolol and amiodarone  Monitor arrhythmia burden while on inotropic support  AC: heparin gtt, eventually can transition to NOAC

## 2021-12-01 NOTE — PROGRESS NOTE ADULT - PROBLEM SELECTOR PLAN 6
?Congestive hepatopathy  Cirrhotic liver reported on recent CT  Consider RUQ US  Optimize HF.

## 2021-12-01 NOTE — CHART NOTE - NSCHARTNOTEFT_GEN_A_CORE
RN called in regard to edema w/ pain of the R arm. Pt was seen at bedside reports that he had an IV removed previously from the site of edema and noticed the area today when he attempted to stand up. He also complains of SOB that is worse than his baseline. VSS.    ROS: No chest pain, palpitations, lightheadedness, dizziness, headache, nausea/vomiting, fevers/chills, abdominal pain, dysuria or increased urinary frequency.      Vital Signs Last 24 Hrs  T(C): 36.6 (30 Nov 2021 21:35), Max: 37.2 (30 Nov 2021 11:09)  T(F): 97.8 (30 Nov 2021 21:35), Max: 99 (30 Nov 2021 11:09)  HR: 82 (01 Dec 2021 04:33) (62 - 82)  BP: 99/65 (01 Dec 2021 04:33) (83/50 - 113/73)  BP(mean): --  RR: 20 (01 Dec 2021 04:33) (18 - 20)  SpO2: 95% (01 Dec 2021 04:33) (90% - 95%)      GENERAL: NAD, sitting comfortably  HEAD: Atraumatic, Normocephalic  EYES: EOMI, conjunctiva and sclera clear  NERVOUS SYSTEM: Alert and awake, Good concentration  CHEST/LUNG: Clear to auscultation b/l; Accessory muscle use  HEART: Regular rate and rhythm  ABDOMEN: Soft, Nontender, Nondistended;   EXTREMITIES:  2+ Peripheral Pulses, significant edema and ecchymosis of the RUE, area is TTP and boggy, distal UE sensation is intact and symmetrical    EXAM:  US DPLX UPR EXT VEINS LTD RT                        PROCEDURE DATE:  12/01/2021    INTERPRETATION:  CLINICAL INDICATION: Edema, pain.  TECHNIQUE: Grayscale, color Doppler and spectral Doppler ultrasound was utilized to evaluate the right upper extremity deep venous system.  COMPARISON: None.  FINDINGS: This study was technically difficult due to patient's inability to cooperate.  There is no obvious thrombus in the right internal jugular vein, subclavian vein, axillary vein or brachial vein. Visualized right radial vein and ulnar vein are patent. Visualized right basilic vein and cephalic vein are patent without thrombosis.  There is right upper extremity soft tissue edema.  IMPRESSION:  No obvious deep vein thrombosis in the right upper extremity.    ABG pending  CXR official read pending, shows possible worsening of L sided pulmonary edema  Bumex and Milrenone already ordered   RN to notify for any changes RN called in regard to edema w/ pain of the R arm. Pt was seen at bedside reports that he had an IV removed previously from the site of edema and noticed the area today when he attempted to stand up. He also complains of SOB that is worse than his baseline. VSS.    ROS: No chest pain, palpitations, lightheadedness, dizziness, headache, nausea/vomiting, fevers/chills, abdominal pain, dysuria or increased urinary frequency.      Vital Signs Last 24 Hrs  T(C): 36.6 (30 Nov 2021 21:35), Max: 37.2 (30 Nov 2021 11:09)  T(F): 97.8 (30 Nov 2021 21:35), Max: 99 (30 Nov 2021 11:09)  HR: 82 (01 Dec 2021 04:33) (62 - 82)  BP: 99/65 (01 Dec 2021 04:33) (83/50 - 113/73)  BP(mean): --  RR: 20 (01 Dec 2021 04:33) (18 - 20)  SpO2: 95% (01 Dec 2021 04:33) (90% - 95%)      GENERAL: NAD, sitting comfortably  HEAD: Atraumatic, Normocephalic  EYES: EOMI, conjunctiva and sclera clear  NERVOUS SYSTEM: Alert and awake, Good concentration  CHEST/LUNG: Clear to auscultation b/l; Accessory muscle use  HEART: Regular rate and rhythm  ABDOMEN: Soft, Nontender, Nondistended;   EXTREMITIES:  2+ Peripheral Pulses, significant edema and ecchymosis of the RUE, area is TTP and boggy, distal UE sensation is intact and symmetrical    EXAM:  US DPLX UPR EXT VEINS LTD RT                        PROCEDURE DATE:  12/01/2021    INTERPRETATION:  CLINICAL INDICATION: Edema, pain.  TECHNIQUE: Grayscale, color Doppler and spectral Doppler ultrasound was utilized to evaluate the right upper extremity deep venous system.  COMPARISON: None.  FINDINGS: This study was technically difficult due to patient's inability to cooperate.  There is no obvious thrombus in the right internal jugular vein, subclavian vein, axillary vein or brachial vein. Visualized right radial vein and ulnar vein are patent. Visualized right basilic vein and cephalic vein are patent without thrombosis.  There is right upper extremity soft tissue edema.  IMPRESSION:  No obvious deep vein thrombosis in the right upper extremity.    ABG pending  CXR official read pending, shows possible worsening of L sided pulmonary edema  Bumex and Milrenone already ordered   Warm compress was applied and elevated  RN to notify for any changes

## 2021-12-01 NOTE — PROGRESS NOTE ADULT - SUBJECTIVE AND OBJECTIVE BOX
Interval History: no acute events overnight    Medications:  acetaminophen     Tablet .. 650 milliGRAM(s) Oral every 6 hours PRN  ALBUTerol    90 MICROgram(s) HFA Inhaler 2 Puff(s) Inhalation every 6 hours  aluminum hydroxide/magnesium hydroxide/simethicone Suspension 30 milliLiter(s) Oral every 4 hours PRN  aMIOdarone    Tablet 200 milliGRAM(s) Oral daily  aspirin enteric coated 81 milliGRAM(s) Oral daily  atorvastatin 40 milliGRAM(s) Oral at bedtime  bisoprolol   Tablet 5 milliGRAM(s) Oral daily  budesonide 160 MICROgram(s)/formoterol 4.5 MICROgram(s) Inhaler 2 Puff(s) Inhalation two times a day  buMETAnide 4 milliGRAM(s) Oral two times a day  dextrose 40% Gel 15 Gram(s) Oral once  dextrose 5%. 1000 milliLiter(s) IV Continuous <Continuous>  dextrose 5%. 1000 milliLiter(s) IV Continuous <Continuous>  dextrose 50% Injectable 25 Gram(s) IV Push once  dextrose 50% Injectable 12.5 Gram(s) IV Push once  dextrose 50% Injectable 25 Gram(s) IV Push once  glucagon  Injectable 1 milliGRAM(s) IntraMuscular once  heparin   Injectable 6000 Unit(s) IV Push every 6 hours PRN  heparin   Injectable 3000 Unit(s) IV Push every 6 hours PRN  heparin  Infusion. 1000 Unit(s)/Hr IV Continuous <Continuous>  influenza   Vaccine 0.5 milliLiter(s) IntraMuscular once  insulin lispro (ADMELOG) corrective regimen sliding scale   SubCutaneous three times a day before meals  losartan 12.5 milliGRAM(s) Oral daily  melatonin 3 milliGRAM(s) Oral at bedtime  melatonin 3 milliGRAM(s) Oral at bedtime PRN  milrinone Infusion 0.125 MICROgram(s)/kG/Min IV Continuous <Continuous>  nicotine -  14 mG/24Hr(s) Patch 1 patch Transdermal daily  ondansetron Injectable 4 milliGRAM(s) IV Push every 8 hours PRN  spironolactone 25 milliGRAM(s) Oral daily  tiotropium 18 MICROgram(s) Capsule 1 Capsule(s) Inhalation daily      Vitals:  T(C): 36.6 (11-30-21 @ 21:35), Max: 36.9 (11-30-21 @ 13:55)  HR: 82 (12-01-21 @ 04:33) (62 - 82)  BP: 99/65 (12-01-21 @ 04:33) (88/52 - 113/73)  BP(mean): --  RR: 20 (12-01-21 @ 04:33) (18 - 20)  SpO2: 95% (12-01-21 @ 04:33) (94% - 95%)    Daily     Daily         I&O's Summary    30 Nov 2021 07:01  -  01 Dec 2021 07:00  --------------------------------------------------------  IN: 0 mL / OUT: 800 mL / NET: -800 mL        Physical Exam:  Appearance: No Acute Distress  HEENT: PERRL  Neck: No JVD  Cardiovascular: Normal S1 S2, No murmurs/rubs/gallops  Respiratory: Clear to auscultation bilaterally  Gastrointestinal: Soft, Non-tender	  Skin: No cyanosis	  Neurologic: Non-focal  Extremities: No LE edema  Psychiatry: A & O x 3, Mood & affect appropriate    Labs:                        14.7   10.44 )-----------( 157      ( 01 Dec 2021 04:30 )             43.5     12-01    133<L>  |  90<L>  |  48.1<H>  ----------------------------<  129<H>  4.2   |  28.0  |  1.50<H>    Ca    8.6      01 Dec 2021 10:16  Phos  3.2     11-30  Mg     1.6     11-30    TPro  7.0  /  Alb  4.0  /  TBili  1.3  /  DBili  x   /  AST  41<H>  /  ALT  31  /  AlkPhos  225<H>  12-01    PTT - ( 01 Dec 2021 04:23 )  PTT:61.5 sec

## 2021-12-01 NOTE — DISCHARGE NOTE NURSING/CASE MANAGEMENT/SOCIAL WORK - NSDCPEFALRISK_GEN_ALL_CORE
For information on Fall & Injury Prevention, visit: [email or web address] OR  [email or web address] OR  https://www.cdc.gov/steadi/patient.html

## 2021-12-01 NOTE — DISCHARGE NOTE NURSING/CASE MANAGEMENT/SOCIAL WORK - NSDCFUADDAPPT_GEN_ALL_CORE_FT
FOLLOW-UP APPOINTMENTS:    Friday 12/10/2021 at 10:00am, Dr. Rasmussen, [facility] ([place]).     FOLLOW-UP APPOINTMENTS:    CARDIOLOGY: Tuesday 12/7/2021 at 2:45pm, JOSE CRUZ [place] & Dr. Thompson, [address] [place] [address], Phone: [phone]    [facility] CLINIC: Friday 12/10/2021 at 10:00am, Dr. Rasmussen, [facility] ([place]).

## 2021-12-01 NOTE — CHART NOTE - NSCHARTNOTESELECT_GEN_ALL_CORE
Event Note
Nutrition Services
Event Note
Off Service Note

## 2021-12-01 NOTE — PROGRESS NOTE ADULT - ASSESSMENT
62yo M w/ PMHx of CAD, Nonischemic Cardiomyopathy - CHF (EF in 2016 15-20%) s/p ICD (reports placed about 5 months ago by Dr. Shaikh),and  HTN admitted after his ICD fired multiple times, worsening dyspnea ,found to have rapid atrial f;lutter  pt is seen by cardiology and EPS , treated with iv lasix , oxygen ., medication adjusted , pt is with worsening renal disease , fluid overload Rate uncontrolled , VANESSA CV performed , cont anticoagulation v heparin     #Acute on cronic systolic heart failure with edema, ascites; acute hypoxic resp failure present on admission is now improved (off O2)  - on Milrinone drip , rate controlled   - moderate ascites - paracentesis was deferred since couldn't stop AC - appears to be improving with diuresis  - cardiology / HF team follow up   - Bumex and milrinone gtt 11/24, tolerate well  - changed bumex to 4mg BID  - discontinued milrinone drip  - daily BMP  - c/w Spironolactone  - losartan 12.5mg daily    #hematoma of the left arm  - tender to touch  - likely hemotoma  - lidocaine patch  - change heparin drip to DOAC  - US without obvious collection, appears to be a soft tissue edema  - continue to monitor for now    #ARF / hyperkalemia likely secondary to cardiorenal syndrome   - cr stable today  - nephrology follow up     #Atrial flutter with high rate , new onset   - s/p VANESSA cardioversion   - NSR controlled , cont amino, bystolic , anticoagulation iv heparin  - eventual change to DOAC    #Acute hypoxic resp failure   - likely  multifactorial CHF , A flutter , underlying lung disease ?COPD ,  active smoker   - continue to monitor for now    #ICD discharge - inappropriate shocks as per EP; adjusted  - cont to follow   - stable     #CAD , non obstructive   - cont aspirin statin   - CP atypical resolved   - troponin neg     #Controlled Diabetes Mellitus , new ?   - hba1c 6.3   - cont diet , diabetic education   - ISS     #Nicotine use disorder   - nicotine patch   - smoking cessation provided     #Substance use   - urine tox cocaine and THC +   - pt aware not use cocaine on BB     #Moderate protein spencer malnutrition   - cont diet   - cont diet      DISPO: transition to PO eliquis today; off milrinone, continue bumex; discharge planning

## 2021-12-01 NOTE — PROGRESS NOTE ADULT - SUBJECTIVE AND OBJECTIVE BOX
CC: HANNAH Barry firing (29 Nov 2021 06:49)    INTERVAL HPI/OVERNIGHT EVENTS:  no acute events overnight  without acute complaints    Vital Signs Last 24 Hrs  T(C): 36.8 (01 Dec 2021 12:34), Max: 36.8 (01 Dec 2021 12:34)  T(F): 98.2 (01 Dec 2021 12:34), Max: 98.2 (01 Dec 2021 12:34)  HR: 76 (01 Dec 2021 12:34) (62 - 82)  BP: 94/65 (01 Dec 2021 12:34) (94/65 - 113/73)  BP(mean): --  RR: 20 (01 Dec 2021 04:33) (18 - 20)  SpO2: 96% (01 Dec 2021 12:34) (95% - 96%)    PHYSICAL EXAM:  General: in no acute distress  Eyes: PERRLA, EOMI; conjunctiva and sclera clear  Head: Normocephalic; atraumatic  ENMT: No nasal discharge; airway clear  Neck: Supple; non tender; no masses  Respiratory: No wheezes, rales or rhonchi  Cardiovascular: Regular rate and rhythm. S1 and S2 Normal; No murmurs, gallops or rubs  Gastrointestinal: Soft non-tender non-distended; Normal bowel sounds  Genitourinary: No costovertebral angle tenderness  Extremities: Normal range of motion, No clubbing, cyanosis; pitting edema in the lower ext appears improved  Neurological: Alert and oriented x4  Skin: Warm and dry. No acute rash  Psychiatric: Cooperative and appropriate    I&O's Detail    28 Nov 2021 07:01  -  29 Nov 2021 07:00  --------------------------------------------------------  IN:    Bumetanide: 32.5 mL    Heparin Infusion: 134 mL    Milrinone: 75.4 mL    Oral Fluid: 120 mL  Total IN: 361.9 mL    OUT:    Voided (mL): 3000 mL  Total OUT: 3000 mL    Total NET: -2638.1 mL                                   14.4   5.10  )-----------( 145      ( 30 Nov 2021 07:38 )             42.8     11-30    133<L>  |  91<L>  |  46.5<H>  ----------------------------<  136<H>  3.7   |  27.0  |  1.63<H>    Ca    8.4<L>      30 Nov 2021 07:38  Phos  3.2     11-30  Mg     1.6     11-30    MEDICATIONS  (STANDING):  ALBUTerol    90 MICROgram(s) HFA Inhaler 2 Puff(s) Inhalation every 6 hours  aMIOdarone    Tablet 200 milliGRAM(s) Oral daily  aspirin enteric coated 81 milliGRAM(s) Oral daily  atorvastatin 40 milliGRAM(s) Oral at bedtime  bisoprolol   Tablet 5 milliGRAM(s) Oral daily  budesonide 160 MICROgram(s)/formoterol 4.5 MICROgram(s) Inhaler 2 Puff(s) Inhalation two times a day  buMETAnide Infusion 0.5 mG/Hr (2.5 mL/Hr) IV Continuous <Continuous>  dextrose 40% Gel 15 Gram(s) Oral once  dextrose 5%. 1000 milliLiter(s) (100 mL/Hr) IV Continuous <Continuous>  dextrose 5%. 1000 milliLiter(s) (50 mL/Hr) IV Continuous <Continuous>  dextrose 50% Injectable 25 Gram(s) IV Push once  dextrose 50% Injectable 12.5 Gram(s) IV Push once  dextrose 50% Injectable 25 Gram(s) IV Push once  glucagon  Injectable 1 milliGRAM(s) IntraMuscular once  heparin  Infusion. 1000 Unit(s)/Hr (10 mL/Hr) IV Continuous <Continuous>  influenza   Vaccine 0.5 milliLiter(s) IntraMuscular once  insulin lispro (ADMELOG) corrective regimen sliding scale   SubCutaneous three times a day before meals  losartan 12.5 milliGRAM(s) Oral daily  magnesium sulfate  IVPB 2 Gram(s) IV Intermittent once  melatonin 3 milliGRAM(s) Oral at bedtime  milrinone Infusion 0.25 MICROgram(s)/kG/Min (5.45 mL/Hr) IV Continuous <Continuous>  nicotine -  14 mG/24Hr(s) Patch 1 patch Transdermal daily  spironolactone 25 milliGRAM(s) Oral daily  tiotropium 18 MICROgram(s) Capsule 1 Capsule(s) Inhalation daily    MEDICATIONS  (PRN):  acetaminophen     Tablet .. 650 milliGRAM(s) Oral every 6 hours PRN Temp greater or equal to 38C (100.4F), Mild Pain (1 - 3)  aluminum hydroxide/magnesium hydroxide/simethicone Suspension 30 milliLiter(s) Oral every 4 hours PRN Dyspepsia  heparin   Injectable 6000 Unit(s) IV Push every 6 hours PRN For aPTT less than 40  heparin   Injectable 3000 Unit(s) IV Push every 6 hours PRN For aPTT between 40 - 57  melatonin 3 milliGRAM(s) Oral at bedtime PRN Insomnia  ondansetron Injectable 4 milliGRAM(s) IV Push every 8 hours PRN Nausea and/or Vomiting  traMADol 50 milliGRAM(s) Oral every 4 hours PRN Severe Pain (7 - 10)      RADIOLOGY & ADDITIONAL TESTS:

## 2021-12-01 NOTE — PROGRESS NOTE ADULT - TIME BILLING
- Review of records, telemetry, vital signs and daily labs.   - General and cardiovascular physical examination.  - Generation of cardiovascular treatment plan.  - Coordination of care with primary team.
Acute decompensated HFrEF and New onset Atrial flutter
- Review of records, telemetry, vital signs and daily labs.   - General and cardiovascular physical examination.  - Generation of cardiovascular treatment plan.  - Coordination of care with primary team.

## 2021-12-01 NOTE — DISCHARGE NOTE NURSING/CASE MANAGEMENT/SOCIAL WORK - PATIENT PORTAL LINK FT
You can access the FollowMyHealth Patient Portal offered by [facility] by registering at the following website: [email or web address]. By joining [facility]’s FollowMyHealth portal, you will also be able to view your health information using other applications (apps) compatible with our system.

## 2021-12-01 NOTE — PROGRESS NOTE ADULT - PROBLEM SELECTOR PLAN 3
Nonobstructive  Medical therapy.

## 2021-12-01 NOTE — PROGRESS NOTE ADULT - ASSESSMENT
62 y/o with h/o chronic systolic HF ACC/AHA stage C, NICMP LVEF <20% LVIDd 6.96cm, CAD, s/p ICD, HTN, active tobacco use, ?COPD who p/w inappropriate shocks in setting of new onset aflutter with RVR. He was also found to be in anasarca with ascites. Was started on low dose diuretics with no significant diuresis and worsening creatinine. Patient states he take shis medictaions regularly. Unfortunately, he's had dietary indiscretion as he lives from shelter to shelter. This is his 3rd HF related hospitalization in the past 12 months. He was last admitted to an OSH with CHF exac in October and he was discharged on 11/3.   He was started on milrinone gtt and bumex gtt with excellent response. He also underwent successful VANESSA/DCCV on 11/22 and i on amiodarone for rhythm control. Will introduce GDMT as tolerated.     Cards: Alison ([facility])    Pertinent Cardiac Studies  11/18/21 EKG Aflutter LAD. PRWP. NS T wave changes  11/20/21 LVEF <20% LVIDd 6.96cm VTI 5cm, RV severely enlarged with severely reduced systolic HF, sev biatrial enlargement, mod-sev MR, moderate TR, RVSP 38mmHg  11/22/21 VANESSA limited studye, LVEF <20%, no FADUMO thrombus  2016 [facility] LM minor irreg, mid LAD 50%, LCx minor irreg, RCA prox 60% mid 85%

## 2021-12-01 NOTE — PROGRESS NOTE ADULT - NUTRITIONAL ASSESSMENT
This patient has been assessed with a concern for Malnutrition and has been determined to have a diagnosis/diagnoses of Moderate protein-calorie malnutrition.    This patient is being managed with:   Diet DASH/TLC-  Sodium & Cholesterol Restricted  Consistent Carbohydrate {Evening Snack} (CSTCHOSN)  Entered: Nov 23 2021 12:41PM    

## 2021-12-01 NOTE — DISCHARGE NOTE NURSING/CASE MANAGEMENT/SOCIAL WORK - NSDCVIVACCINE_GEN_ALL_CORE_FT
influenza, injectable, quadrivalent, preservative free; 27-Nov-2016 15:07; Tala Wynne (RN); [employer]; [id number]; IntraMuscular; Deltoid Right.; 0.5 milliLiter(s); VIS (VIS Published: 07-Aug-2015, VIS Presented: 27-Nov-2016);

## 2021-12-01 NOTE — PROGRESS NOTE ADULT - PROBLEM SELECTOR PLAN 5
?new - 6.3  As per medicine.
6.3  As per medicine.

## 2021-12-02 ENCOUNTER — TRANSCRIPTION ENCOUNTER (OUTPATIENT)
Age: 61
End: 2021-12-02

## 2021-12-02 VITALS
TEMPERATURE: 99 F | DIASTOLIC BLOOD PRESSURE: 71 MMHG | SYSTOLIC BLOOD PRESSURE: 94 MMHG | RESPIRATION RATE: 20 BRPM | HEART RATE: 78 BPM | OXYGEN SATURATION: 93 %

## 2021-12-02 LAB
ANION GAP SERPL CALC-SCNC: 16 MMOL/L — SIGNIFICANT CHANGE UP (ref 5–17)
APTT BLD: 38.2 SEC — HIGH (ref 27.5–35.5)
BUN SERPL-MCNC: 47 MG/DL — HIGH (ref 8–20)
CALCIUM SERPL-MCNC: 8.8 MG/DL — SIGNIFICANT CHANGE UP (ref 8.6–10.2)
CHLORIDE SERPL-SCNC: 92 MMOL/L — LOW (ref 98–107)
CO2 SERPL-SCNC: 26 MMOL/L — SIGNIFICANT CHANGE UP (ref 22–29)
CREAT SERPL-MCNC: 1.61 MG/DL — HIGH (ref 0.5–1.3)
GLUCOSE BLDC GLUCOMTR-MCNC: 118 MG/DL — HIGH (ref 70–99)
GLUCOSE BLDC GLUCOMTR-MCNC: 123 MG/DL — HIGH (ref 70–99)
GLUCOSE SERPL-MCNC: 112 MG/DL — HIGH (ref 70–99)
HCT VFR BLD CALC: 45.3 % — SIGNIFICANT CHANGE UP (ref 39–50)
HGB BLD-MCNC: 15 G/DL — SIGNIFICANT CHANGE UP (ref 13–17)
MCHC RBC-ENTMCNC: 29.3 PG — SIGNIFICANT CHANGE UP (ref 27–34)
MCHC RBC-ENTMCNC: 33.1 GM/DL — SIGNIFICANT CHANGE UP (ref 32–36)
MCV RBC AUTO: 88.5 FL — SIGNIFICANT CHANGE UP (ref 80–100)
PLATELET # BLD AUTO: 163 K/UL — SIGNIFICANT CHANGE UP (ref 150–400)
POTASSIUM SERPL-MCNC: 4.5 MMOL/L — SIGNIFICANT CHANGE UP (ref 3.5–5.3)
POTASSIUM SERPL-SCNC: 4.5 MMOL/L — SIGNIFICANT CHANGE UP (ref 3.5–5.3)
RBC # BLD: 5.12 M/UL — SIGNIFICANT CHANGE UP (ref 4.2–5.8)
RBC # FLD: 15.3 % — HIGH (ref 10.3–14.5)
SODIUM SERPL-SCNC: 134 MMOL/L — LOW (ref 135–145)
WBC # BLD: 7.02 K/UL — SIGNIFICANT CHANGE UP (ref 3.8–10.5)
WBC # FLD AUTO: 7.02 K/UL — SIGNIFICANT CHANGE UP (ref 3.8–10.5)

## 2021-12-02 PROCEDURE — 93306 TTE W/DOPPLER COMPLETE: CPT

## 2021-12-02 PROCEDURE — 82947 ASSAY GLUCOSE BLOOD QUANT: CPT

## 2021-12-02 PROCEDURE — 87635 SARS-COV-2 COVID-19 AMP PRB: CPT

## 2021-12-02 PROCEDURE — 84133 ASSAY OF URINE POTASSIUM: CPT

## 2021-12-02 PROCEDURE — U0003: CPT

## 2021-12-02 PROCEDURE — 83036 HEMOGLOBIN GLYCOSYLATED A1C: CPT

## 2021-12-02 PROCEDURE — 83935 ASSAY OF URINE OSMOLALITY: CPT

## 2021-12-02 PROCEDURE — 84132 ASSAY OF SERUM POTASSIUM: CPT

## 2021-12-02 PROCEDURE — 82550 ASSAY OF CK (CPK): CPT

## 2021-12-02 PROCEDURE — 93005 ELECTROCARDIOGRAM TRACING: CPT

## 2021-12-02 PROCEDURE — 80053 COMPREHEN METABOLIC PANEL: CPT

## 2021-12-02 PROCEDURE — 96374 THER/PROPH/DIAG INJ IV PUSH: CPT

## 2021-12-02 PROCEDURE — 94640 AIRWAY INHALATION TREATMENT: CPT

## 2021-12-02 PROCEDURE — U0005: CPT

## 2021-12-02 PROCEDURE — 99285 EMERGENCY DEPT VISIT HI MDM: CPT | Mod: 25

## 2021-12-02 PROCEDURE — 85014 HEMATOCRIT: CPT

## 2021-12-02 PROCEDURE — 84484 ASSAY OF TROPONIN QUANT: CPT

## 2021-12-02 PROCEDURE — 82553 CREATINE MB FRACTION: CPT

## 2021-12-02 PROCEDURE — 83605 ASSAY OF LACTIC ACID: CPT

## 2021-12-02 PROCEDURE — 71250 CT THORAX DX C-: CPT

## 2021-12-02 PROCEDURE — 92960 CARDIOVERSION ELECTRIC EXT: CPT

## 2021-12-02 PROCEDURE — 94760 N-INVAS EAR/PLS OXIMETRY 1: CPT

## 2021-12-02 PROCEDURE — 93971 EXTREMITY STUDY: CPT

## 2021-12-02 PROCEDURE — 83550 IRON BINDING TEST: CPT

## 2021-12-02 PROCEDURE — 86769 SARS-COV-2 COVID-19 ANTIBODY: CPT

## 2021-12-02 PROCEDURE — 97530 THERAPEUTIC ACTIVITIES: CPT

## 2021-12-02 PROCEDURE — 71046 X-RAY EXAM CHEST 2 VIEWS: CPT

## 2021-12-02 PROCEDURE — 80307 DRUG TEST PRSMV CHEM ANLYZR: CPT

## 2021-12-02 PROCEDURE — 85027 COMPLETE CBC AUTOMATED: CPT

## 2021-12-02 PROCEDURE — 93320 DOPPLER ECHO COMPLETE: CPT

## 2021-12-02 PROCEDURE — 82962 GLUCOSE BLOOD TEST: CPT

## 2021-12-02 PROCEDURE — 84466 ASSAY OF TRANSFERRIN: CPT

## 2021-12-02 PROCEDURE — 36415 COLL VENOUS BLD VENIPUNCTURE: CPT

## 2021-12-02 PROCEDURE — 99239 HOSP IP/OBS DSCHRG MGMT >30: CPT

## 2021-12-02 PROCEDURE — 84300 ASSAY OF URINE SODIUM: CPT

## 2021-12-02 PROCEDURE — 83880 ASSAY OF NATRIURETIC PEPTIDE: CPT

## 2021-12-02 PROCEDURE — 71045 X-RAY EXAM CHEST 1 VIEW: CPT

## 2021-12-02 PROCEDURE — 93312 ECHO TRANSESOPHAGEAL: CPT

## 2021-12-02 PROCEDURE — 85610 PROTHROMBIN TIME: CPT

## 2021-12-02 PROCEDURE — 82435 ASSAY OF BLOOD CHLORIDE: CPT

## 2021-12-02 PROCEDURE — 82803 BLOOD GASES ANY COMBINATION: CPT

## 2021-12-02 PROCEDURE — 76705 ECHO EXAM OF ABDOMEN: CPT

## 2021-12-02 PROCEDURE — 93325 DOPPLER ECHO COLOR FLOW MAPG: CPT

## 2021-12-02 PROCEDURE — 97116 GAIT TRAINING THERAPY: CPT

## 2021-12-02 PROCEDURE — 74176 CT ABD & PELVIS W/O CONTRAST: CPT

## 2021-12-02 PROCEDURE — 82330 ASSAY OF CALCIUM: CPT

## 2021-12-02 PROCEDURE — 85730 THROMBOPLASTIN TIME PARTIAL: CPT

## 2021-12-02 PROCEDURE — 85025 COMPLETE CBC W/AUTO DIFF WBC: CPT

## 2021-12-02 PROCEDURE — 83540 ASSAY OF IRON: CPT

## 2021-12-02 PROCEDURE — 84443 ASSAY THYROID STIM HORMONE: CPT

## 2021-12-02 PROCEDURE — 84100 ASSAY OF PHOSPHORUS: CPT

## 2021-12-02 PROCEDURE — 81001 URINALYSIS AUTO W/SCOPE: CPT

## 2021-12-02 PROCEDURE — 85018 HEMOGLOBIN: CPT

## 2021-12-02 PROCEDURE — 93880 EXTRACRANIAL BILAT STUDY: CPT

## 2021-12-02 PROCEDURE — 83735 ASSAY OF MAGNESIUM: CPT

## 2021-12-02 PROCEDURE — 84295 ASSAY OF SERUM SODIUM: CPT

## 2021-12-02 PROCEDURE — 80048 BASIC METABOLIC PNL TOTAL CA: CPT

## 2021-12-02 RX ORDER — LOSARTAN POTASSIUM 100 MG/1
1 TABLET, FILM COATED ORAL
Qty: 0 | Refills: 0 | DISCHARGE

## 2021-12-02 RX ORDER — SPIRONOLACTONE 25 MG/1
1 TABLET, FILM COATED ORAL
Qty: 0 | Refills: 0 | DISCHARGE
Start: 2021-12-02

## 2021-12-02 RX ORDER — LOSARTAN POTASSIUM 100 MG/1
0.5 TABLET, FILM COATED ORAL
Qty: 15 | Refills: 0
Start: 2021-12-02 | End: 2021-12-31

## 2021-12-02 RX ORDER — CARVEDILOL PHOSPHATE 80 MG/1
1 CAPSULE, EXTENDED RELEASE ORAL
Qty: 0 | Refills: 0 | DISCHARGE

## 2021-12-02 RX ORDER — ATORVASTATIN CALCIUM 80 MG/1
1 TABLET, FILM COATED ORAL
Qty: 0 | Refills: 0 | DISCHARGE
Start: 2021-12-02

## 2021-12-02 RX ORDER — BISOPROLOL FUMARATE 10 MG/1
1 TABLET, FILM COATED ORAL
Qty: 0 | Refills: 0 | DISCHARGE
Start: 2021-12-02

## 2021-12-02 RX ORDER — AMIODARONE HYDROCHLORIDE 400 MG/1
1 TABLET ORAL
Qty: 0 | Refills: 0 | DISCHARGE
Start: 2021-12-02

## 2021-12-02 RX ORDER — AMIODARONE HYDROCHLORIDE 400 MG/1
1 TABLET ORAL
Qty: 30 | Refills: 0
Start: 2021-12-02 | End: 2021-12-31

## 2021-12-02 RX ORDER — LOSARTAN POTASSIUM 100 MG/1
0.5 TABLET, FILM COATED ORAL
Qty: 0 | Refills: 0 | DISCHARGE

## 2021-12-02 RX ORDER — BUMETANIDE 0.25 MG/ML
2 INJECTION INTRAMUSCULAR; INTRAVENOUS
Qty: 0 | Refills: 0 | DISCHARGE
Start: 2021-12-02

## 2021-12-02 RX ORDER — BISOPROLOL FUMARATE 10 MG/1
1 TABLET, FILM COATED ORAL
Qty: 30 | Refills: 0
Start: 2021-12-02 | End: 2021-12-31

## 2021-12-02 RX ORDER — APIXABAN 2.5 MG/1
1 TABLET, FILM COATED ORAL
Qty: 0 | Refills: 0 | DISCHARGE
Start: 2021-12-02

## 2021-12-02 RX ORDER — ASPIRIN/CALCIUM CARB/MAGNESIUM 324 MG
1 TABLET ORAL
Qty: 30 | Refills: 0
Start: 2021-12-02 | End: 2021-12-31

## 2021-12-02 RX ORDER — SPIRONOLACTONE 25 MG/1
1 TABLET, FILM COATED ORAL
Qty: 30 | Refills: 0
Start: 2021-12-02 | End: 2021-12-31

## 2021-12-02 RX ORDER — APIXABAN 2.5 MG/1
1 TABLET, FILM COATED ORAL
Qty: 60 | Refills: 0
Start: 2021-12-02 | End: 2021-12-31

## 2021-12-02 RX ORDER — BUMETANIDE 0.25 MG/ML
2 INJECTION INTRAMUSCULAR; INTRAVENOUS
Qty: 120 | Refills: 0
Start: 2021-12-02 | End: 2021-12-31

## 2021-12-02 RX ORDER — ATORVASTATIN CALCIUM 80 MG/1
1 TABLET, FILM COATED ORAL
Qty: 30 | Refills: 0
Start: 2021-12-02 | End: 2021-12-31

## 2021-12-02 RX ADMIN — APIXABAN 5 MILLIGRAM(S): 2.5 TABLET, FILM COATED ORAL at 17:56

## 2021-12-02 RX ADMIN — BUMETANIDE 4 MILLIGRAM(S): 0.25 INJECTION INTRAMUSCULAR; INTRAVENOUS at 17:45

## 2021-12-02 RX ADMIN — ALBUTEROL 2 PUFF(S): 90 AEROSOL, METERED ORAL at 16:27

## 2021-12-02 RX ADMIN — Medication 81 MILLIGRAM(S): at 11:58

## 2021-12-02 RX ADMIN — ALBUTEROL 2 PUFF(S): 90 AEROSOL, METERED ORAL at 11:59

## 2021-12-02 RX ADMIN — LIDOCAINE 1 PATCH: 4 CREAM TOPICAL at 10:32

## 2021-12-02 RX ADMIN — Medication 1 PATCH: at 11:58

## 2021-12-02 NOTE — DISCHARGE NOTE PROVIDER - PROVIDER TOKENS
PROVIDER:[TOKEN:[[id number]],FOLLOWUP:[2 weeks]] FREE:[LAST:[Samara],FIRST:[Rolando],PHONE:[phone]],FAX:[phone]],ADDRESS:[[address] [place] [address]],SCHEDULEDAPPT:[12/07/2021],SCHEDULEDAPPTTIME:[01:00 PM]]

## 2021-12-02 NOTE — DISCHARGE NOTE PROVIDER - CARE PROVIDER_API CALL
Rolando Pascual)  Cardiovascular Disease; Internal Medicine  [address] [place] [id number]  Phone: [phone]  Fax: [phone]  Follow Up Time: 2 weeks   Rolando Pascual  [address] [place] [address]  Phone: [phone]  Fax: [phone]  Scheduled Appointment: 12/07/2021 01:00 PM

## 2021-12-02 NOTE — PROGRESS NOTE ADULT - ASSESSMENT
DERRICK: improved since admission  Renal function stavle Cr 1.6 suspect new baseline  CM (EF < 20%), decompensated CHF- improved  No hydronephrosis seen on CT scan  - cont to avoid potential nephrotoxins  - cont Bumex infusion, inotrope, aldactone as per cardiology  - monitoring K+ and Screat closely on Aldactone and ARB  - will need to accept a significant degree of azotemia to keep CHF compensated    RO: hypophosphatemia  - monitor off Phoslo    AM labs, will follow

## 2021-12-02 NOTE — PROGRESS NOTE ADULT - SUBJECTIVE AND OBJECTIVE BOX
NEPHROLOGY INTERVAL HPI/OVERNIGHT EVENTS:    Examined  No distress    MEDICATIONS  (STANDING):  ALBUTerol    90 MICROgram(s) HFA Inhaler 2 Puff(s) Inhalation every 6 hours  aMIOdarone    Tablet 200 milliGRAM(s) Oral daily  apixaban 5 milliGRAM(s) Oral every 12 hours  aspirin enteric coated 81 milliGRAM(s) Oral daily  atorvastatin 40 milliGRAM(s) Oral at bedtime  bisoprolol   Tablet 5 milliGRAM(s) Oral daily  budesonide 160 MICROgram(s)/formoterol 4.5 MICROgram(s) Inhaler 2 Puff(s) Inhalation two times a day  buMETAnide 4 milliGRAM(s) Oral two times a day  dextrose 40% Gel 15 Gram(s) Oral once  dextrose 5%. 1000 milliLiter(s) (50 mL/Hr) IV Continuous <Continuous>  dextrose 5%. 1000 milliLiter(s) (100 mL/Hr) IV Continuous <Continuous>  dextrose 50% Injectable 25 Gram(s) IV Push once  dextrose 50% Injectable 12.5 Gram(s) IV Push once  dextrose 50% Injectable 25 Gram(s) IV Push once  glucagon  Injectable 1 milliGRAM(s) IntraMuscular once  influenza   Vaccine 0.5 milliLiter(s) IntraMuscular once  insulin lispro (ADMELOG) corrective regimen sliding scale   SubCutaneous three times a day before meals  lidocaine   4% Patch 1 Patch Transdermal daily  losartan 12.5 milliGRAM(s) Oral daily  melatonin 3 milliGRAM(s) Oral at bedtime  nicotine -  14 mG/24Hr(s) Patch 1 patch Transdermal daily  spironolactone 25 milliGRAM(s) Oral daily  tiotropium 18 MICROgram(s) Capsule 1 Capsule(s) Inhalation daily    MEDICATIONS  (PRN):  acetaminophen     Tablet .. 650 milliGRAM(s) Oral every 6 hours PRN Temp greater or equal to 38C (100.4F), Mild Pain (1 - 3)  aluminum hydroxide/magnesium hydroxide/simethicone Suspension 30 milliLiter(s) Oral every 4 hours PRN Dyspepsia  melatonin 3 milliGRAM(s) Oral at bedtime PRN Insomnia  ondansetron Injectable 4 milliGRAM(s) IV Push every 8 hours PRN Nausea and/or Vomiting      Allergies    No Known Allergies    Intolerances    pork (Other)      Vital Signs Last 24 Hrs  T(C): 36.6 (02 Dec 2021 12:28), Max: 37 (02 Dec 2021 05:01)  T(F): 97.9 (02 Dec 2021 12:28), Max: 98.6 (02 Dec 2021 05:01)  HR: 80 (02 Dec 2021 12:28) (75 - 89)  BP: 98/76 (02 Dec 2021 12:28) (92/62 - 100/67)  BP(mean): --  RR: 18 (02 Dec 2021 05:01) (18 - 20)  SpO2: 95% (02 Dec 2021 12:28) (90% - 95%)  Daily     Daily     PHYSICAL EXAM:  GENERAL: NAD  NECK: Supple, No JVD  NERVOUS SYSTEM:  A/O x3, non focal  CHEST: Clear bilaterally  HEART:  RRR, no rub  ABDOMEN: Soft, + distension +BS  EXTREMITIES: + LE edema improved    LABS:                        15.0   7.02  )-----------( 163      ( 02 Dec 2021 07:40 )             45.3     12-02    134<L>  |  92<L>  |  47.0<H>  ----------------------------<  112<H>  4.5   |  26.0  |  1.61<H>    Ca    8.8      02 Dec 2021 07:40    TPro  7.0  /  Alb  4.0  /  TBili  1.3  /  DBili  x   /  AST  41<H>  /  ALT  31  /  AlkPhos  225<H>  12-01    PTT - ( 02 Dec 2021 07:40 )  PTT:38.2 sec      ABG - ( 01 Dec 2021 07:00 )  pH, Arterial: 7.450 pH, Blood: x     /  pCO2: 44    /  pO2: 87    / HCO3: 31    / Base Excess: 6.6   /  SaO2: 97.5                  RADIOLOGY & ADDITIONAL TESTS:

## 2021-12-02 NOTE — PROGRESS NOTE ADULT - REASON FOR ADMISSION
Aflutter, AICD firing

## 2021-12-02 NOTE — DISCHARGE NOTE PROVIDER - HOSPITAL COURSE
60 y/o with h/o chronic systolic HF ACC/AHA stage C, NICMP LVEF <20% LVIDd 6.96cm, CAD, s/p ICD, HTN, active tobacco use, ?COPD who p/w inappropriate shocks of ICD in setting of new onset aflutter with RVR. He was also found to be anasarca with ascites. Pt was started on low dose diuretics with no significant diuresis and worsening creatinine. Pt was started on a milrinone gtt and bumex gtt with good response. Eventually weaned off milrinone and bumex GTT and transitioned to GDMT per cardio. Pt also underwent successfull VANESSA/DCCV on 11/22 for rapid afib and was place don amiodarone for rhythm control. Pt is now medically stable for DC home. Seen in consult by sw for dispo issues. Plan to DC to a place where he rents a room.     Vital Signs Last 24 Hrs  T(C): 36.6 (02 Dec 2021 12:28), Max: 37 (02 Dec 2021 05:01)  T(F): 97.9 (02 Dec 2021 12:28), Max: 98.6 (02 Dec 2021 05:01)  HR: 80 (02 Dec 2021 12:28) (75 - 89)  BP: 98/76 (02 Dec 2021 12:28) (92/62 - 100/67)  BP(mean): --  RR: 18 (02 Dec 2021 05:01) (18 - 20)  SpO2: 95% (02 Dec 2021 12:28) (90% - 95%)       DC 35 mins

## 2021-12-02 NOTE — DISCHARGE NOTE PROVIDER - DETAILS OF MALNUTRITION DIAGNOSIS/DIAGNOSES
This patient has been assessed with a concern for Malnutrition and was treated during this hospitalization for the following Nutrition diagnosis/diagnoses:     -  11/23/2021: Moderate protein-calorie malnutrition

## 2021-12-02 NOTE — DISCHARGE NOTE PROVIDER - NSDCFUADDAPPT_GEN_ALL_CORE_FT
FOLLOW-UP APPOINTMENTS:    CARDIOLOGY: Tuesday 12/7/2021 at 2:45pm, JOSE [facility] & Dr. Thompson, [address] [place] [address], Phone: [phone]    [facility] CLINIC: Friday 12/10/2021 at 10:00am, Dr. Rasmussen, [facility] ([place]

## 2021-12-02 NOTE — PROGRESS NOTE ADULT - PROVIDER SPECIALTY LIST ADULT
Cardiology
Electrophysiology
Nephrology
Nephrology
Cardiology
Cardiology
Electrophysiology
Electrophysiology
Hospitalist
Hospitalist
Nephrology
Cardiology
Cardiology
Electrophysiology
Electrophysiology
Hospitalist
Nephrology
Nephrology
Cardiology
Cardiology
Electrophysiology
Hospitalist
Nephrology
Nephrology
Hospitalist
Heart Failure

## 2021-12-02 NOTE — DISCHARGE NOTE PROVIDER - NSDCMRMEDTOKEN_GEN_ALL_CORE_FT
amiodarone 200 mg oral tablet: 1 tab(s) orally once a day  apixaban 5 mg oral tablet: 1 tab(s) orally every 12 hours  aspirin 81 mg oral delayed release tablet: 1 tab(s) orally once a day  atorvastatin 40 mg oral tablet: 1 tab(s) orally once a day (at bedtime)  bisoprolol 5 mg oral tablet: 1 tab(s) orally once a day  bumetanide 2 mg oral tablet: 2 tab(s) orally 2 times a day  losartan 25 mg oral tablet: 0.5 tab(s) orally once a day  spironolactone 25 mg oral tablet: 1 tab(s) orally once a day

## 2021-12-02 NOTE — DISCHARGE NOTE PROVIDER - NSDCCPCAREPLAN_GEN_ALL_CORE_FT
PRINCIPAL DISCHARGE DIAGNOSIS  Diagnosis: Atrial flutter with rapid ventricular response  Assessment and Plan of Treatment: S/p VANESSA/DCCV, continue amiodarone and eliquis      SECONDARY DISCHARGE DIAGNOSES  Diagnosis: Acute systolic congestive heart failure  Assessment and Plan of Treatment: EF of < 20%. Continue with bisoprolol, aldactone and  losartan as prescribed. Outpatient cardio follow up, contact info provided.    Diagnosis: CAD (coronary artery disease)  Assessment and Plan of Treatment: Continue ASA statin     PRINCIPAL DISCHARGE DIAGNOSIS  Diagnosis: Atrial flutter with rapid ventricular response  Assessment and Plan of Treatment: S/p VANESSA/DCCV, continue amiodarone and eliquis      SECONDARY DISCHARGE DIAGNOSES  Diagnosis: Acute systolic congestive heart failure  Assessment and Plan of Treatment: EF of < 20%. Continue with bisoprolol, aldactone and  losartan as prescribed. Outpatient cardio follow up, contact info provided.    Diagnosis: CAD (coronary artery disease)  Assessment and Plan of Treatment: Continue ASA statin    Diagnosis: Prediabetes  Assessment and Plan of Treatment: hga1c of 6.3. Pt counselled on diet and exercise. Repeat hga1c in 3 months with PMD at clinic.     PRINCIPAL DISCHARGE DIAGNOSIS  Diagnosis: Atrial flutter with rapid ventricular response  Assessment and Plan of Treatment: S/p VANESSA/DCCV, continue amiodarone and eliquis      SECONDARY DISCHARGE DIAGNOSES  Diagnosis: Acute systolic congestive heart failure  Assessment and Plan of Treatment: EF of < 20%. Continue with bisoprolol, aldactone and  losartan as prescribed. Outpatient cardio follow up, contact info provided.    Diagnosis: CAD (coronary artery disease)  Assessment and Plan of Treatment: Continue ASA statin    Diagnosis: Prediabetes  Assessment and Plan of Treatment: hga1c of 6.3. Pt counselled on diet and exercise. Repeat hga1c in 3 months with PMD at clinic.    Diagnosis: DERRICK (acute kidney injury)  Assessment and Plan of Treatment: Now improved. Outpatient follow up [facility] PMD at clinic

## 2021-12-07 ENCOUNTER — APPOINTMENT (OUTPATIENT)
Dept: HEART FAILURE | Facility: CLINIC | Age: 61
End: 2021-12-07

## 2021-12-07 ENCOUNTER — APPOINTMENT (OUTPATIENT)
Dept: CARDIOLOGY | Facility: CLINIC | Age: 61
End: 2021-12-07

## 2021-12-09 DIAGNOSIS — I25.10 ATHEROSCLEROTIC HEART DISEASE OF NATIVE CORONARY ARTERY W/OUT ANGINA PECTORIS: ICD-10-CM

## 2021-12-09 DIAGNOSIS — N04.9 NEPHROTIC SYNDROME WITH UNSPECIFIED MORPHOLOGIC CHANGES: ICD-10-CM

## 2021-12-09 DIAGNOSIS — I50.9 HEART FAILURE, UNSPECIFIED: ICD-10-CM

## 2021-12-09 DIAGNOSIS — Z72.89 OTHER PROBLEMS RELATED TO LIFESTYLE: ICD-10-CM

## 2021-12-09 DIAGNOSIS — F14.90 COCAINE USE, UNSPECIFIED, UNCOMPLICATED: ICD-10-CM

## 2021-12-09 DIAGNOSIS — I48.92 UNSPECIFIED ATRIAL FLUTTER: ICD-10-CM

## 2021-12-09 DIAGNOSIS — F17.200 NICOTINE DEPENDENCE, UNSPECIFIED, UNCOMPLICATED: ICD-10-CM

## 2021-12-09 DIAGNOSIS — I10 ESSENTIAL (PRIMARY) HYPERTENSION: ICD-10-CM

## 2021-12-09 DIAGNOSIS — J44.9 CHRONIC OBSTRUCTIVE PULMONARY DISEASE, UNSPECIFIED: ICD-10-CM

## 2021-12-09 DIAGNOSIS — Z87.898 PERSONAL HISTORY OF OTHER SPECIFIED CONDITIONS: ICD-10-CM

## 2021-12-09 RX ORDER — NICOTINE TRANSDERMAL SYSTEM 21 MG/24H
21 PATCH, EXTENDED RELEASE TRANSDERMAL
Qty: 28 | Refills: 0 | Status: ACTIVE | COMMUNITY
Start: 2021-11-03

## 2021-12-09 RX ORDER — BISOPROLOL FUMARATE 5 MG/1
5 TABLET, FILM COATED ORAL DAILY
Qty: 90 | Refills: 1 | Status: ACTIVE | COMMUNITY
Start: 2021-12-02

## 2021-12-09 RX ORDER — TORSEMIDE 20 MG/1
20 TABLET ORAL
Qty: 60 | Refills: 0 | Status: ACTIVE | COMMUNITY
Start: 2021-11-15

## 2021-12-09 RX ORDER — ATORVASTATIN CALCIUM 40 MG/1
40 TABLET, FILM COATED ORAL
Qty: 1 | Refills: 1 | Status: ACTIVE | COMMUNITY
Start: 2021-12-02

## 2021-12-09 RX ORDER — AMIODARONE HYDROCHLORIDE 200 MG/1
200 TABLET ORAL
Qty: 90 | Refills: 0 | Status: ACTIVE | COMMUNITY
Start: 2021-12-02

## 2021-12-09 RX ORDER — CARVEDILOL 12.5 MG/1
12.5 TABLET, FILM COATED ORAL
Qty: 60 | Refills: 0 | Status: ACTIVE | COMMUNITY
Start: 2021-11-03

## 2021-12-09 RX ORDER — BUMETANIDE 2 MG/1
2 TABLET ORAL TWICE DAILY
Refills: 0 | Status: ACTIVE | COMMUNITY
Start: 2021-12-02

## 2021-12-09 RX ORDER — ASPIRIN ENTERIC COATED TABLETS 81 MG 81 MG/1
81 TABLET, DELAYED RELEASE ORAL
Qty: 90 | Refills: 3 | Status: ACTIVE | COMMUNITY
Start: 2021-12-02

## 2021-12-09 RX ORDER — SACUBITRIL AND VALSARTAN 24; 26 MG/1; MG/1
24-26 TABLET, FILM COATED ORAL
Qty: 60 | Refills: 0 | Status: ACTIVE | COMMUNITY
Start: 2021-11-13

## 2021-12-09 RX ORDER — SPIRONOLACTONE 25 MG/1
25 TABLET ORAL DAILY
Qty: 30 | Refills: 3 | Status: ACTIVE | COMMUNITY
Start: 2021-12-02

## 2021-12-09 RX ORDER — APIXABAN 5 MG/1
5 TABLET, FILM COATED ORAL
Qty: 30 | Refills: 6 | Status: ACTIVE | COMMUNITY
Start: 2021-12-02

## 2021-12-09 RX ORDER — LOSARTAN POTASSIUM 25 MG/1
25 TABLET, FILM COATED ORAL DAILY
Refills: 0 | Status: ACTIVE | COMMUNITY
Start: 2021-11-15

## 2021-12-09 RX ORDER — CARVEDILOL 3.12 MG/1
3.12 TABLET, FILM COATED ORAL
Qty: 60 | Refills: 0 | Status: ACTIVE | COMMUNITY
Start: 2021-11-15

## 2021-12-10 NOTE — HISTORY OF PRESENT ILLNESS
[de-identified] : This is a 61 years old man with CAD, non-ischemic CM, severe biventricular failure, primary prevention ICD at OSH (RV ICD lead in the RVOT), and HTN. He got admitted (with recurrent ICD shocks due to 1:1 AFL. He was in and out of the AFL after ICD shocks. He was admitted on the night of 11/18 and was seen on by us 11/19. He was started on rate control (coreg changed to bisoprolol) and started on Lovenox. His HF was managed by cardiology. Iinital plan was for inpatient VANESSA-ablation today (Monday 11/22), but over the weekend he had worsening of HF, new DERRICK (probable cardio-renal), ascites, and worsening pleural effusion. It was deemed unsafe to proceed with ablation and instead a VANESSA-DCCV was planned. Lovenox changed to heparin IV given the DERRICK. He was seen by renal. His ICD rates were elevated to minimize risk of recurrent ICD shocks while admitted. He subsequently underwent cardioversion followed by amiodarone initiation on 11/22/21. \par \par Pre procedure VANESSA revealed EF <20%.\par \par INCOMPLETE NOTE NOTE YET SEEN

## 2021-12-13 ENCOUNTER — APPOINTMENT (OUTPATIENT)
Dept: ELECTROPHYSIOLOGY | Facility: CLINIC | Age: 61
End: 2021-12-13

## 2021-12-23 ENCOUNTER — INPATIENT (INPATIENT)
Facility: HOSPITAL | Age: 61
LOS: 7 days | Discharge: HOME | DRG: 291 | End: 2021-12-31
Attending: GENERAL ACUTE CARE HOSPITAL | Admitting: HOSPITALIST
Payer: MEDICAID

## 2021-12-23 VITALS
OXYGEN SATURATION: 97 % | DIASTOLIC BLOOD PRESSURE: 76 MMHG | TEMPERATURE: 98 F | SYSTOLIC BLOOD PRESSURE: 118 MMHG | HEIGHT: 72 IN | RESPIRATION RATE: 18 BRPM | HEART RATE: 83 BPM

## 2021-12-23 LAB
ALBUMIN SERPL ELPH-MCNC: 3.8 G/DL — SIGNIFICANT CHANGE UP (ref 3.3–5.2)
ALP SERPL-CCNC: 227 U/L — HIGH (ref 40–120)
ALT FLD-CCNC: 19 U/L — SIGNIFICANT CHANGE UP
ANION GAP SERPL CALC-SCNC: 16 MMOL/L — SIGNIFICANT CHANGE UP (ref 5–17)
APTT BLD: 34 SEC — SIGNIFICANT CHANGE UP (ref 27.5–35.5)
AST SERPL-CCNC: 42 U/L — HIGH
BASOPHILS # BLD AUTO: 0.03 K/UL — SIGNIFICANT CHANGE UP (ref 0–0.2)
BASOPHILS NFR BLD AUTO: 0.6 % — SIGNIFICANT CHANGE UP (ref 0–2)
BILIRUB SERPL-MCNC: 1.4 MG/DL — SIGNIFICANT CHANGE UP (ref 0.4–2)
BUN SERPL-MCNC: 71.2 MG/DL — HIGH (ref 8–20)
CALCIUM SERPL-MCNC: 8.8 MG/DL — SIGNIFICANT CHANGE UP (ref 8.6–10.2)
CHLORIDE SERPL-SCNC: 90 MMOL/L — LOW (ref 98–107)
CO2 SERPL-SCNC: 20 MMOL/L — LOW (ref 22–29)
CREAT SERPL-MCNC: 1.94 MG/DL — HIGH (ref 0.5–1.3)
EOSINOPHIL # BLD AUTO: 0.03 K/UL — SIGNIFICANT CHANGE UP (ref 0–0.5)
EOSINOPHIL NFR BLD AUTO: 0.6 % — SIGNIFICANT CHANGE UP (ref 0–6)
GLUCOSE SERPL-MCNC: 97 MG/DL — SIGNIFICANT CHANGE UP (ref 70–99)
HCT VFR BLD CALC: 45.7 % — SIGNIFICANT CHANGE UP (ref 39–50)
HGB BLD-MCNC: 14.8 G/DL — SIGNIFICANT CHANGE UP (ref 13–17)
IMM GRANULOCYTES NFR BLD AUTO: 0.6 % — SIGNIFICANT CHANGE UP (ref 0–1.5)
INR BLD: 2.06 RATIO — HIGH (ref 0.88–1.16)
LYMPHOCYTES # BLD AUTO: 0.79 K/UL — LOW (ref 1–3.3)
LYMPHOCYTES # BLD AUTO: 14.8 % — SIGNIFICANT CHANGE UP (ref 13–44)
MAGNESIUM SERPL-MCNC: 2.5 MG/DL — SIGNIFICANT CHANGE UP (ref 1.8–2.6)
MCHC RBC-ENTMCNC: 28.3 PG — SIGNIFICANT CHANGE UP (ref 27–34)
MCHC RBC-ENTMCNC: 32.4 GM/DL — SIGNIFICANT CHANGE UP (ref 32–36)
MCV RBC AUTO: 87.4 FL — SIGNIFICANT CHANGE UP (ref 80–100)
MONOCYTES # BLD AUTO: 0.87 K/UL — SIGNIFICANT CHANGE UP (ref 0–0.9)
MONOCYTES NFR BLD AUTO: 16.3 % — HIGH (ref 2–14)
NEUTROPHILS # BLD AUTO: 3.59 K/UL — SIGNIFICANT CHANGE UP (ref 1.8–7.4)
NEUTROPHILS NFR BLD AUTO: 67.1 % — SIGNIFICANT CHANGE UP (ref 43–77)
NT-PROBNP SERPL-SCNC: HIGH PG/ML (ref 0–300)
PLATELET # BLD AUTO: 283 K/UL — SIGNIFICANT CHANGE UP (ref 150–400)
POTASSIUM SERPL-MCNC: 5.9 MMOL/L — HIGH (ref 3.5–5.3)
POTASSIUM SERPL-SCNC: 5.9 MMOL/L — HIGH (ref 3.5–5.3)
PROT SERPL-MCNC: 6.8 G/DL — SIGNIFICANT CHANGE UP (ref 6.6–8.7)
PROTHROM AB SERPL-ACNC: 23.1 SEC — HIGH (ref 10.6–13.6)
RBC # BLD: 5.23 M/UL — SIGNIFICANT CHANGE UP (ref 4.2–5.8)
RBC # FLD: 15.5 % — HIGH (ref 10.3–14.5)
SODIUM SERPL-SCNC: 126 MMOL/L — LOW (ref 135–145)
TROPONIN T SERPL-MCNC: <0.01 NG/ML — SIGNIFICANT CHANGE UP (ref 0–0.06)
WBC # BLD: 5.34 K/UL — SIGNIFICANT CHANGE UP (ref 3.8–10.5)
WBC # FLD AUTO: 5.34 K/UL — SIGNIFICANT CHANGE UP (ref 3.8–10.5)

## 2021-12-23 PROCEDURE — 93971 EXTREMITY STUDY: CPT | Mod: 26,RT

## 2021-12-23 PROCEDURE — 71045 X-RAY EXAM CHEST 1 VIEW: CPT | Mod: 26

## 2021-12-23 PROCEDURE — 73630 X-RAY EXAM OF FOOT: CPT | Mod: 26,RT

## 2021-12-23 PROCEDURE — 99285 EMERGENCY DEPT VISIT HI MDM: CPT

## 2021-12-23 PROCEDURE — 93970 EXTREMITY STUDY: CPT | Mod: 26

## 2021-12-23 RX ORDER — ACETAMINOPHEN 500 MG
975 TABLET ORAL ONCE
Refills: 0 | Status: COMPLETED | OUTPATIENT
Start: 2021-12-23 | End: 2021-12-24

## 2021-12-23 RX ORDER — OXYCODONE HYDROCHLORIDE 5 MG/1
5 TABLET ORAL ONCE
Refills: 0 | Status: DISCONTINUED | OUTPATIENT
Start: 2021-12-23 | End: 2021-12-23

## 2021-12-23 RX ADMIN — OXYCODONE HYDROCHLORIDE 5 MILLIGRAM(S): 5 TABLET ORAL at 21:44

## 2021-12-23 RX ADMIN — OXYCODONE HYDROCHLORIDE 5 MILLIGRAM(S): 5 TABLET ORAL at 21:20

## 2021-12-23 NOTE — ED ADULT NURSE REASSESSMENT NOTE - NS ED NURSE REASSESS COMMENT FT1
patient A&Ox3 in no acute distress no pain or disc at this time , no difficulty breathing at this time no chest pain , keep on cardiac monitor continue to monitor

## 2021-12-23 NOTE — ED PROVIDER NOTE - MUSCULOSKELETAL, MLM
RUE atraumatic, TTP along the medial biceps. RLE with pitting edema, strong DP pulse and normal sensation, ttp to the plantar surface around 1st/2nd toes proximal to MTP

## 2021-12-23 NOTE — ED PROVIDER NOTE - CLINICAL SUMMARY MEDICAL DECISION MAKING FREE TEXT BOX
Pt with right upper arm pain after recent line place, DVT ruled out by US. Also with atraumatic foot pain, no obvious fractures on XR but still unable to bear weight, will get Ct to evaluate for occult fractures. Respiratory and volume status appear at baseline.

## 2021-12-23 NOTE — ED PROVIDER NOTE - OBJECTIVE STATEMENT
61yom with HF p/w right upper arm pain, right foot pain and increased shortness of breath from baseline. Admitted last month for exacerbation of CHF and new onset AF, was discharged 3 weeks ago and since then has been having pain in the right upper arm, he states he had a procedure there (IV access?) and since has been having pain. This morning he woke up with a sharp, burning pain in the plantar side of the foot proximal to the toes. Denies any trauma or inciting event. Legs are a little more swollen than usual and he does endorse some increased dyspnea.

## 2021-12-23 NOTE — ED ADULT NURSE NOTE - CCCP TRG CHIEF CMPLNT
shortness of breath PAST SURGICAL HISTORY:  H/O:  x 76071,    History of partial thyroidectomy  PAST SURGICAL HISTORY:  Class 1 obesity with body mass index (BMI) of 33.0 to 33.9 in adult     H/O:  x 2 -    H/O: hysterectomy partial     H/O: osteoarthritis     S/P laminectomy 19- Anterior Cervical Laminectomy

## 2021-12-23 NOTE — ED PROVIDER NOTE - RESPIRATORY, MLM
Chart reviewed.   Requested updates from Care Everywhere.  Immunizations not able to be reconciled. Patient not in LINKS.   HM updated.      
Bibasilar crackles

## 2021-12-23 NOTE — ED PROVIDER NOTE - CARE PLAN
1 Principal Discharge DX:	Acute pulmonary edema with congestive heart failure  Secondary Diagnosis:	Hypoxia  Secondary Diagnosis:	Acute hyponatremia  Secondary Diagnosis:	Inability to ambulate due to right ankle or foot

## 2021-12-23 NOTE — ED ADULT NURSE REASSESSMENT NOTE - NS ED NURSE REASSESS COMMENT FT1
patient in sono at this time , Dr Lawton awake patient BNP, K+ and Na level no other orders at this time

## 2021-12-23 NOTE — ED ADULT NURSE NOTE - OBJECTIVE STATEMENT
patient A&Ox3 in no acute diasters c/o of SOB and R foot pain for 1 week , patient denied chest pain at this time

## 2021-12-23 NOTE — ED PROVIDER NOTE - NS ED MD TWO NIGHTS YN
How Severe Is Your Skin Lesion?: mild Has Your Skin Lesion Been Treated?: not been treated Is This A New Presentation, Or A Follow-Up?: Skin Lesion Yes

## 2021-12-24 DIAGNOSIS — I50.1 LEFT VENTRICULAR FAILURE, UNSPECIFIED: ICD-10-CM

## 2021-12-24 LAB
ANION GAP SERPL CALC-SCNC: 17 MMOL/L — SIGNIFICANT CHANGE UP (ref 5–17)
BUN SERPL-MCNC: 72.4 MG/DL — HIGH (ref 8–20)
CALCIUM SERPL-MCNC: 8.9 MG/DL — SIGNIFICANT CHANGE UP (ref 8.6–10.2)
CHLORIDE SERPL-SCNC: 90 MMOL/L — LOW (ref 98–107)
CO2 SERPL-SCNC: 17 MMOL/L — LOW (ref 22–29)
CREAT SERPL-MCNC: 2.22 MG/DL — HIGH (ref 0.5–1.3)
GLUCOSE BLDC GLUCOMTR-MCNC: 116 MG/DL — HIGH (ref 70–99)
GLUCOSE BLDC GLUCOMTR-MCNC: 93 MG/DL — SIGNIFICANT CHANGE UP (ref 70–99)
GLUCOSE SERPL-MCNC: 124 MG/DL — HIGH (ref 70–99)
POTASSIUM SERPL-MCNC: 5.2 MMOL/L — SIGNIFICANT CHANGE UP (ref 3.5–5.3)
POTASSIUM SERPL-SCNC: 5.2 MMOL/L — SIGNIFICANT CHANGE UP (ref 3.5–5.3)
RAPID RVP RESULT: SIGNIFICANT CHANGE UP
SARS-COV-2 RNA SPEC QL NAA+PROBE: SIGNIFICANT CHANGE UP
SODIUM SERPL-SCNC: 124 MMOL/L — LOW (ref 135–145)

## 2021-12-24 PROCEDURE — 99221 1ST HOSP IP/OBS SF/LOW 40: CPT

## 2021-12-24 PROCEDURE — 99223 1ST HOSP IP/OBS HIGH 75: CPT

## 2021-12-24 PROCEDURE — 73700 CT LOWER EXTREMITY W/O DYE: CPT | Mod: 26,RT,MA

## 2021-12-24 RX ORDER — INSULIN LISPRO 100/ML
VIAL (ML) SUBCUTANEOUS AT BEDTIME
Refills: 0 | Status: DISCONTINUED | OUTPATIENT
Start: 2021-12-24 | End: 2021-12-31

## 2021-12-24 RX ORDER — SODIUM CHLORIDE 9 MG/ML
1000 INJECTION, SOLUTION INTRAVENOUS
Refills: 0 | Status: DISCONTINUED | OUTPATIENT
Start: 2021-12-24 | End: 2021-12-31

## 2021-12-24 RX ORDER — FUROSEMIDE 40 MG
20 TABLET ORAL ONCE
Refills: 0 | Status: COMPLETED | OUTPATIENT
Start: 2021-12-24 | End: 2021-12-24

## 2021-12-24 RX ORDER — MORPHINE SULFATE 50 MG/1
4 CAPSULE, EXTENDED RELEASE ORAL ONCE
Refills: 0 | Status: DISCONTINUED | OUTPATIENT
Start: 2021-12-24 | End: 2021-12-24

## 2021-12-24 RX ORDER — LANOLIN ALCOHOL/MO/W.PET/CERES
3 CREAM (GRAM) TOPICAL AT BEDTIME
Refills: 0 | Status: DISCONTINUED | OUTPATIENT
Start: 2021-12-24 | End: 2021-12-31

## 2021-12-24 RX ORDER — DEXTROSE 50 % IN WATER 50 %
25 SYRINGE (ML) INTRAVENOUS ONCE
Refills: 0 | Status: DISCONTINUED | OUTPATIENT
Start: 2021-12-24 | End: 2021-12-31

## 2021-12-24 RX ORDER — BUMETANIDE 0.25 MG/ML
2 INJECTION INTRAMUSCULAR; INTRAVENOUS
Refills: 0 | Status: DISCONTINUED | OUTPATIENT
Start: 2021-12-24 | End: 2021-12-24

## 2021-12-24 RX ORDER — ATORVASTATIN CALCIUM 80 MG/1
40 TABLET, FILM COATED ORAL AT BEDTIME
Refills: 0 | Status: DISCONTINUED | OUTPATIENT
Start: 2021-12-24 | End: 2021-12-31

## 2021-12-24 RX ORDER — DEXTROSE 50 % IN WATER 50 %
12.5 SYRINGE (ML) INTRAVENOUS ONCE
Refills: 0 | Status: DISCONTINUED | OUTPATIENT
Start: 2021-12-24 | End: 2021-12-31

## 2021-12-24 RX ORDER — SODIUM BICARBONATE 1 MEQ/ML
1300 SYRINGE (ML) INTRAVENOUS THREE TIMES A DAY
Refills: 0 | Status: DISCONTINUED | OUTPATIENT
Start: 2021-12-24 | End: 2021-12-28

## 2021-12-24 RX ORDER — INSULIN LISPRO 100/ML
VIAL (ML) SUBCUTANEOUS
Refills: 0 | Status: DISCONTINUED | OUTPATIENT
Start: 2021-12-24 | End: 2021-12-31

## 2021-12-24 RX ORDER — INFLUENZA VIRUS VACCINE 15; 15; 15; 15 UG/.5ML; UG/.5ML; UG/.5ML; UG/.5ML
0.5 SUSPENSION INTRAMUSCULAR ONCE
Refills: 0 | Status: DISCONTINUED | OUTPATIENT
Start: 2021-12-24 | End: 2021-12-31

## 2021-12-24 RX ORDER — GLUCAGON INJECTION, SOLUTION 0.5 MG/.1ML
1 INJECTION, SOLUTION SUBCUTANEOUS ONCE
Refills: 0 | Status: DISCONTINUED | OUTPATIENT
Start: 2021-12-24 | End: 2021-12-31

## 2021-12-24 RX ORDER — AMIODARONE HYDROCHLORIDE 400 MG/1
200 TABLET ORAL DAILY
Refills: 0 | Status: DISCONTINUED | OUTPATIENT
Start: 2021-12-24 | End: 2021-12-31

## 2021-12-24 RX ORDER — APIXABAN 2.5 MG/1
5 TABLET, FILM COATED ORAL EVERY 12 HOURS
Refills: 0 | Status: DISCONTINUED | OUTPATIENT
Start: 2021-12-24 | End: 2021-12-27

## 2021-12-24 RX ORDER — GABAPENTIN 400 MG/1
100 CAPSULE ORAL THREE TIMES A DAY
Refills: 0 | Status: DISCONTINUED | OUTPATIENT
Start: 2021-12-24 | End: 2021-12-31

## 2021-12-24 RX ORDER — GABAPENTIN 400 MG/1
300 CAPSULE ORAL ONCE
Refills: 0 | Status: COMPLETED | OUTPATIENT
Start: 2021-12-24 | End: 2021-12-24

## 2021-12-24 RX ORDER — BUMETANIDE 0.25 MG/ML
2 INJECTION INTRAMUSCULAR; INTRAVENOUS EVERY 12 HOURS
Refills: 0 | Status: DISCONTINUED | OUTPATIENT
Start: 2021-12-24 | End: 2021-12-27

## 2021-12-24 RX ORDER — DEXTROSE 50 % IN WATER 50 %
15 SYRINGE (ML) INTRAVENOUS ONCE
Refills: 0 | Status: DISCONTINUED | OUTPATIENT
Start: 2021-12-24 | End: 2021-12-31

## 2021-12-24 RX ORDER — ONDANSETRON 8 MG/1
4 TABLET, FILM COATED ORAL EVERY 8 HOURS
Refills: 0 | Status: DISCONTINUED | OUTPATIENT
Start: 2021-12-24 | End: 2021-12-31

## 2021-12-24 RX ORDER — ASPIRIN/CALCIUM CARB/MAGNESIUM 324 MG
81 TABLET ORAL DAILY
Refills: 0 | Status: DISCONTINUED | OUTPATIENT
Start: 2021-12-24 | End: 2021-12-31

## 2021-12-24 RX ADMIN — GABAPENTIN 300 MILLIGRAM(S): 400 CAPSULE ORAL at 01:41

## 2021-12-24 RX ADMIN — MORPHINE SULFATE 4 MILLIGRAM(S): 50 CAPSULE, EXTENDED RELEASE ORAL at 08:33

## 2021-12-24 RX ADMIN — Medication 975 MILLIGRAM(S): at 22:40

## 2021-12-24 RX ADMIN — APIXABAN 5 MILLIGRAM(S): 2.5 TABLET, FILM COATED ORAL at 18:12

## 2021-12-24 RX ADMIN — Medication 20 MILLIGRAM(S): at 13:09

## 2021-12-24 RX ADMIN — Medication 975 MILLIGRAM(S): at 22:09

## 2021-12-24 RX ADMIN — MORPHINE SULFATE 4 MILLIGRAM(S): 50 CAPSULE, EXTENDED RELEASE ORAL at 09:10

## 2021-12-24 RX ADMIN — Medication 1300 MILLIGRAM(S): at 22:09

## 2021-12-24 RX ADMIN — ATORVASTATIN CALCIUM 40 MILLIGRAM(S): 80 TABLET, FILM COATED ORAL at 22:09

## 2021-12-24 RX ADMIN — BUMETANIDE 2 MILLIGRAM(S): 0.25 INJECTION INTRAMUSCULAR; INTRAVENOUS at 18:12

## 2021-12-24 RX ADMIN — GABAPENTIN 100 MILLIGRAM(S): 400 CAPSULE ORAL at 22:09

## 2021-12-24 NOTE — CONSULT NOTE ADULT - SUBJECTIVE AND OBJECTIVE BOX
[facility] CARDIOLOGY-[facility]                                                       [facility]                                                           Office:  [address] [place] [address]                                                          Telephone: [phone]. Fax:[phone]                                                                        CARDIOLOGY CONSULTATION NOTE                                                                                               History obtained by: Patient and medical record   obtained: No  Cardiologist: Kash, has not followed up    Chief complaint:    Patient is a 61y old  Male who presents with a chief complaint of arm pain      HPI: 60 y/o M PMHx of CAD, Nonischemic Cardiomyopathy - CHF (EF in 2016 15-20%) s/p ICD (reports placed about 5 months ago by Dr. Shaikh), nonobstructive CAD, HTN, Afib s/p VANESSA DCCV 11/22/2021 presents to ED with c/o R arm pain, R foot pain and increaseing SOB. ProBNP >86078. CXR with pulm congestion.       REVIEW OF SYMPTOMS:   CONSTITUTIONAL: No fever, no weight loss, no fatigue, no weight gain   ENMT:  No difficulty hearing, tinnitus, vertigo; No sinus or throat pain  NECK: No pain or stiffness  CARDIOVASCULAR: No chest pain, no dyspnea, no syncope, no palpitations, no dizziness, no Orthopnea, no Paroxsymal nocturnal dyspnea  RESPIRATORY: No Dyspnea on exertion, no Shortness of breath, no cough, no wheezing  : No dysuria, no hematuria   GI: No dark color stool, no melena, no diarrhea, no constipation, no abdominal pain   NEURO: No headache, no dizziness, no slurred speech   MUSCULOSKELETAL: No joint pain or swelling; No muscle, back, or extremity pain  PSYCH: No agitation, no anxiety.    ALL OTHER REVIEW OF SYSTEMS ARE NEGATIVE.      PREVIOUS DIAGNOSTIC TESTING  ECHO FINDINGS:  < from: VANESSA Echo Doppler (11.22.21 @ 18:34) >  PHYSICIAN INTERPRETATION:  Left Ventricle:  Global LV systolic function was severely decreased. Left ventricular ejection fraction, by visual estimation, is <20%.  In comparison to the previous echocardiogram(s): There are no prior VANESSA studies on this patient for comparison purposes.      Summary:   1. Very limited study due to patient hemodynamic instability.   2. Left ventricular ejection fraction, by visual estimation, is <20% with dilated cardiomyopathy   3. Limited visualization of the left atrial appendage, no evidence of thrombus.    Micah Lua DO Electronically signed on 11/22/2021 at 6:58:45 PM    < end of copied text >  < from: TTE Echo Complete w/o Contrast w/ Doppler (11.20.21 @ 08:33) >    Summary:   1. Left ventricular ejection fraction, by visual estimation, is <20%.   2. Technically good study.   3. Severely enlarged left atrium.   4. Severely enlarged right atrium.   5. Severely enlarged right ventricle.   6. Severely reduced RV systolic function.   7. Moderate to severe mitral valve regurgitation.   8. Thickening of the anterior mitral valve leaflet.   9. Moderate tricuspid regurgitation.  10. Estimated pulmonary artery systolic pressure is 37.8 mmHg assuming a right atrial pressure of 8 mmHg, which is consistent with borderline pulmonary hypertension.    MD Greyson Electronically signed on 11/20/2021 at 12:51:39 PM    < end of copied text >        ALLERGIES: Allergies  No Known Allergies    Intolerances  pork (Other)        PAST MEDICAL HISTORY  Heart failure, systolic  CAD (coronary artery disease)  Hypertension  Nonischemic cardiomyopathy        PAST SURGICAL HISTORY  ICD        FAMILY HISTORY:  FH: lung cancer        SOCIAL HISTORY:   CIGARETTES: current smoker    ALCOHOL: denies  DRUGS: cocaine       CURRENT MEDICATIONS:   acetaminophen     Tablet .      HOME MEDICATIONS:      Vital Signs Last 24 Hrs  T(C): 36.8 (24 Dec 2021 11:36), Max: 36.8 (24 Dec 2021 11:36)  T(F): 98.3 (24 Dec 2021 11:36), Max: 98.3 (24 Dec 2021 11:36)  HR: 80 (24 Dec 2021 13:17) (71 - 83)  BP: 124/62 (24 Dec 2021 13:17) (115/80 - 125/84)  RR: 20 (24 Dec 2021 11:36) (18 - 27)  SpO2: 88% (24 Dec 2021 13:17) (88% - 98%)      PHYSICAL EXAM:  Constitutional: Comfortable . No acute distress.   HEENT: Atraumatic and normocephalic , neck is supple . no JVD. No carotid bruit. PEERL   CNS: A&Ox3. No focal deficits. EOMI. Cranial nerves II-IX are intact.   Respiratory: CTAB, unlabored   Cardiovascular: S1S2 RRR. No murmur/rubs or gallop.  Gastrointestinal: Soft non-tender and non distended . +Bowel sounds.  Extremities: No edema.   Psychiatric: Calm . no agitation.  Skin: No skin rash/ulcers visualized to face, hands or feet.      LABS:                        14.8   5.34  )-----------( 283      ( 23 Dec 2021 21:40 )             45.7     12-24    124<L>  |  90<L>  |  72.4<H>  ----------------------------<  124<H>  5.2   |  17.0<L>  |  2.22<H>    Ca    8.9      24 Dec 2021 09:45  Mg     2.5     12-23    TPro  6.8  /  Alb  3.8  /  TBili  1.4  /  DBili  x   /  AST  42<H>  /  ALT  19  /  AlkPhos  227<H>  12-23    CARDIAC MARKERS ( 23 Dec 2021 21:40 )  x     / <0.01 ng/mL / x     / x     / x        ;p-BNP=Serum Pro-Brain Natriuretic Peptide: 84982 pg/mL (12-23 @ 21:40)    PT/INR - ( 23 Dec 2021 21:40 )   PT: 23.1 sec;   INR: 2.06 ratio         PTT - ( 23 Dec 2021 21:40 )  PTT:34.0 sec      INTERPRETATION OF TELEMETRY: Reviewed by me.   ECG: Reviewed by me.     RADIOLOGY & ADDITIONAL STUDIES:    X-ray:  reviewed by me.                                                          [facility] CARDIOLOGY[facility]                                                       [facility]                                                           Office:  [address] [place] [address]                                                          Telephone: [phone]. Fax:[phone]                                                                        CARDIOLOGY CONSULTATION NOTE                                                                                               History obtained by: Patient and medical record   obtained: No  Cardiologist: Kash, has not followed up    Chief complaint:    Patient is a 61y old  Male who presents with a chief complaint of arm pain      HPI: 62 y/o M PMHx of CAD, Nonischemic Cardiomyopathy - CHF (EF in 2016 15-20%) s/p ICD (reports placed about 5 months ago by Dr. Shaikh), nonobstructive CAD, HTN, Afib s/p VANESSA DCCV 11/22/2021 presents to ED with c/o R arm pain, R foot pain and increaseing SOB. ProBNP >20735. CXR with pulm congestion. Pt states taking all medications, but when probed further admits to only taking 1/2 doses Bumex at night, unclear if compliant with other medications, also states has not used cocaine/crack "in years".       REVIEW OF SYMPTOMS:   CONSTITUTIONAL: No fever, no weight loss, no fatigue, no weight gain   ENMT:  No difficulty hearing, tinnitus, vertigo; No sinus or throat pain  NECK: No pain or stiffness  CARDIOVASCULAR: No chest pain, no dyspnea, no syncope, no palpitations, no dizziness, no Orthopnea, no Paroxsymal nocturnal dyspnea  RESPIRATORY: No Dyspnea on exertion, no Shortness of breath, no cough, no wheezing  : No dysuria, no hematuria   GI: No dark color stool, no melena, no diarrhea, no constipation, no abdominal pain   NEURO: No headache, no dizziness, no slurred speech   MUSCULOSKELETAL: No joint pain. + R foot pain, + r arm pain  PSYCH: No agitation, no anxiety.    ALL OTHER REVIEW OF SYSTEMS ARE NEGATIVE.      PREVIOUS DIAGNOSTIC TESTING  ECHO FINDINGS:  < from: VANESSA Echo Doppler (11.22.21 @ 18:34) >  PHYSICIAN INTERPRETATION:  Left Ventricle:  Global LV systolic function was severely decreased. Left ventricular ejection fraction, by visual estimation, is <20%.  In comparison to the previous echocardiogram(s): There are no prior VANESSA studies on this patient for comparison purposes.      Summary:   1. Very limited study due to patient hemodynamic instability.   2. Left ventricular ejection fraction, by visual estimation, is <20% with dilated cardiomyopathy   3. Limited visualization of the left atrial appendage, no evidence of thrombus.    Micah Lua DO Electronically signed on 11/22/2021 at 6:58:45 PM    < end of copied text >  < from: TTE Echo Complete w/o Contrast w/ Doppler (11.20.21 @ 08:33) >    Summary:   1. Left ventricular ejection fraction, by visual estimation, is <20%.   2. Technically good study.   3. Severely enlarged left atrium.   4. Severely enlarged right atrium.   5. Severely enlarged right ventricle.   6. Severely reduced RV systolic function.   7. Moderate to severe mitral valve regurgitation.   8. Thickening of the anterior mitral valve leaflet.   9. Moderate tricuspid regurgitation.  10. Estimated pulmonary artery systolic pressure is 37.8 mmHg assuming a right atrial pressure of 8 mmHg, which is consistent with borderline pulmonary hypertension.    MD Greyson Electronically signed on 11/20/2021 at 12:51:39 PM    < end of copied text >        ALLERGIES: Allergies  No Known Allergies    Intolerances  pork (Other)        PAST MEDICAL HISTORY  Heart failure, systolic  CAD (coronary artery disease)  Hypertension  Nonischemic cardiomyopathy        PAST SURGICAL HISTORY  ICD        FAMILY HISTORY:  FH: lung cancer        SOCIAL HISTORY:   CIGARETTES: current smoker    ALCOHOL: denies  DRUGS: cocaine       CURRENT MEDICATIONS:   acetaminophen     Tablet .      HOME MEDICATIONS:      Vital Signs Last 24 Hrs  T(C): 36.8 (24 Dec 2021 11:36), Max: 36.8 (24 Dec 2021 11:36)  T(F): 98.3 (24 Dec 2021 11:36), Max: 98.3 (24 Dec 2021 11:36)  HR: 80 (24 Dec 2021 13:17) (71 - 83)  BP: 124/62 (24 Dec 2021 13:17) (115/80 - 125/84)  RR: 20 (24 Dec 2021 11:36) (18 - 27)  SpO2: 88% (24 Dec 2021 13:17) (88% - 98%)      PHYSICAL EXAM:  Constitutional:  No acute distress.   HEENT: Atraumatic and normocephalic , neck is supple . + JVD. PEERL   CNS: A&Ox3. No focal deficits. EOMI.   Respiratory: diminished, unlabored   Cardiovascular: S1S2 RRR. No murmur/rubs or gallop.  Gastrointestinal: Soft non-tender and distended . +Bowel sounds.  Extremities: + edema.   Psychiatric: Calm . no agitation.  Skin: No skin rash/ulcers visualized to face, hands or feet.      LABS:                        14.8   5.34  )-----------( 283      ( 23 Dec 2021 21:40 )             45.7     12-24    124<L>  |  90<L>  |  72.4<H>  ----------------------------<  124<H>  5.2   |  17.0<L>  |  2.22<H>    Ca    8.9      24 Dec 2021 09:45  Mg     2.5     12-23    TPro  6.8  /  Alb  3.8  /  TBili  1.4  /  DBili  x   /  AST  42<H>  /  ALT  19  /  AlkPhos  227<H>  12-23    CARDIAC MARKERS ( 23 Dec 2021 21:40 )  x     / <0.01 ng/mL / x     / x     / x        ;p-BNP=Serum Pro-Brain Natriuretic Peptide: 65201 pg/mL (12-23 @ 21:40)    PT/INR - ( 23 Dec 2021 21:40 )   PT: 23.1 sec;   INR: 2.06 ratio         PTT - ( 23 Dec 2021 21:40 )  PTT:34.0 sec      INTERPRETATION OF TELEMETRY: Reviewed by me.   ECG: Reviewed by me.     RADIOLOGY & ADDITIONAL STUDIES:    X-ray:  reviewed by me.

## 2021-12-24 NOTE — PROVIDER CONTACT NOTE (OTHER) - BACKGROUND
61yom with HF p/w right upper arm pain, right foot pain and increased shortness of breath from baseline. Admitted last month for exacerbation of CHF and new onset AF.

## 2021-12-24 NOTE — PHYSICAL THERAPY INITIAL EVALUATION ADULT - ADDITIONAL COMMENTS
Pt lives alone in an apartment. 8-9 steps to enter without handrails, no steps inside. Pt was ambulatory PTA without an assist device. Pt owns no DME.

## 2021-12-24 NOTE — PROVIDER CONTACT NOTE (OTHER) - ASSESSMENT
supine to Sit at EOB with Min Assist, pt c/o nonresolving dizziness and SPO2 decreased to 88% on 3Lnc. further functional assessment deferred.

## 2021-12-24 NOTE — PATIENT PROFILE ADULT - FALL [place] RISK - UNIVERSAL INTERVENTIONS
Bed in lowest position, wheels locked, appropriate side rails in place/Call bell, personal items and telephone in reach/Instruct patient to call for assistance before getting out of bed or chair/Non-slip footwear when patient is out of bed/[place] to call system/Physically safe environment - no spills, clutter or unnecessary equipment/Purposeful Proactive Rounding/Room/bathroom lighting operational, light cord in reach

## 2021-12-24 NOTE — H&P ADULT - NSHPREVIEWOFSYSTEMS_GEN_ALL_CORE
Review of Systems:  CONSTITUTIONAL: No fevers or chills; +weakness  EYES/ENT: No visual changes;  No vertigo or throat pain   NECK: No pain or stiffness  RESPIRATORY: No cough, wheezing, hemoptysis; +shortness of breath  CARDIOVASCULAR: No chest pain or palpitations  GASTROINTESTINAL: No abdominal or epigastric pain. No nausea, vomiting, or hematemesis; No diarrhea or constipation.   GENITOURINARY: No dysuria, frequency or hematuria  NEUROLOGICAL: No numbness or weakness  SKIN: No itching, burning, rashes, or lesions   MSK +right arm and leg pain

## 2021-12-24 NOTE — PHYSICAL THERAPY INITIAL EVALUATION ADULT - CRITERIA FOR SKILLED THERAPEUTIC INTERVENTIONS
[facility] vs home with assist, Home PT, RW (pending progress)/impairments found/anticipated discharge recommendation

## 2021-12-24 NOTE — CONSULT NOTE ADULT - ASSESSMENT
A) CRF with metabolic acidosis, hyponatremia, Cardiomyopathy with edema plus ascites.    P) Fluid restriction, po Sodium bicarbonate, follow up labs, sono abdomin.

## 2021-12-24 NOTE — CONSULT NOTE ADULT - ASSESSMENT
62 y/o M PMHx of CAD, Nonischemic Cardiomyopathy - CHF (EF in 2016 15-20%) s/p ICD (reports placed about 5 months ago by Dr. Shaikh), nonobstructive CAD, HTN, Afib s/p VANESSA DCCV 11/22/2021 presents to ED with c/o R arm pain, R foot pain and increaseing SOB. ProBNP >37930. CXR with pulm congestion.     Heart failure, acute decompensated  pro BNP 11799  Bumex 2mg IV BID  monitor I and Os  no BB 2/2 cocaine use  hold losartan, spironolactone 2/2 DERRICK  cont bisoprolol     DERRICK on CKD  worsening renal fucnction   nephrology consult    AFib  VANESSA/DCCV 11/22/21  cont amiodarone  cont eliquis     Substance use disorder  send u tox 60 y/o M PMHx of CAD, Nonischemic Cardiomyopathy - CHF (EF in 2016 15-20%) s/p ICD (reports placed about 5 months ago by Dr. Shaikh), nonobstructive CAD, HTN, Afib s/p VANESSA DCCV 11/22/2021 presents to ED with c/o R arm pain, R foot pain and increaseing SOB. ProBNP >59383. CXR with pulm congestion. Pt states taking all medications, but when probed further admits to only taking 1/2 doses Bumex at night, unclear if compliant with other medications, also states has not used cocaine/crack "in years".     Heart failure, acute decompensated  pro BNP 86856  Bumex 2mg IV BID  monitor I and Os  no BB 2/2 cocaine use  hold losartan, spironolactone 2/2 DERRICK  cont bisoprolol     DERRICK on CKD  worsening renal fucnction   nephrology consult    AFib  VANESSA/DCCV 11/22/21  cont amiodarone  cont eliquis     Substance use disorder  denies cocaine "haven't used in years"  send u tox

## 2021-12-24 NOTE — PROVIDER CONTACT NOTE (OTHER) - SITUATION
This morning he woke up with a sharp, burning pain in the plantar side of the foot proximal to the toes. CT foot (-) for fx

## 2021-12-24 NOTE — H&P ADULT - HISTORY OF PRESENT ILLNESS
61 year old male withpmh of cad, chf (combined) w/ reduced ef s/p ICD, substance abuse, coming to hospital with complaints of right arm and foot pain with increased shortness of breath. per patient states started 2 days prior and has been taking most of his medications however does not remember all of the names states has not changed since last discharge. states sob worsens with activity improves with rest. denies nausea vomiting diarrea or chest pain at this time. of note patient never followed up with cardiology since last discharge. right leg and arm pain described as electric and intermittent in nature no correlation to activity.

## 2021-12-24 NOTE — PHYSICAL THERAPY INITIAL EVALUATION ADULT - IMPAIRMENTS CONTRIBUTING IMPAIRED BED MOBILITY, REHAB EVAL
ANDREWS, SPO2 decreased to 88% upon sitting at EOB on 3L O2nc. Pt c/o dizziness, unresolved with seated rest break therefore, pt was assisted back to supine position and remaining functional assessment deferred./impaired postural control/decreased strength

## 2021-12-24 NOTE — CONSULT NOTE ADULT - SUBJECTIVE AND OBJECTIVE BOX
Patient is a 61y old  Male who presents with a chief complaint of SOB.    HPI:   Hx Cardiomyopathy with pacer presents with sob and abdominal distention. Pt has CRF as well but has not been seen by his nephrologist for several months he states. No fever, no hematuria. Pt states legs have become swollen. Gives Hx of pacer  shocks in past as well but not recent. Hx of severly depressed EF.    PAST MEDICAL & SURGICAL HISTORY:  Heart failure, systolic    CAD (coronary artery disease)    Hypertension    Nonischemic cardiomyopathy    No significant past surgical history     DM 2, MO, hypothyroidism, prior DVT and IVC filter; Cholecystectomy    FAMILY HISTORY:  FH: lung cancer    [place]    Social History:  Active  smoker    MEDICATIONS  (STANDING):  acetaminophen     Tablet .. 975 milliGRAM(s) Oral once  aMIOdarone    Tablet 200 milliGRAM(s) Oral daily  apixaban 5 milliGRAM(s) Oral every 12 hours  aspirin enteric coated 81 milliGRAM(s) Oral daily  atorvastatin 40 milliGRAM(s) Oral at bedtime  buMETAnide Injectable 2 milliGRAM(s) IV Push every 12 hours  sodium bicarbonate 1300 milliGRAM(s) Oral three times a day    MEDICATIONS  (PRN):   Meds reviewed    Allergies    No Known Allergies    Intolerances    pork (Other)      REVIEW OF SYSTEMS:    CONSTITUTIONAL:  sob, weight gain, feels weak  EYES: No eye pain, visual disturbances, or discharge  ENMT:  No difficulty hearing, tinnitus, vertigo; No sinus or throat pain  NECK: No pain or stiffness  BREASTS: No pain, masses, or nipple discharge  RESPIRATORY: sobe  CARDIOVASCULAR: No chest pain, palpitations, dizziness,   GASTROINTESTINAL: No abdominal or epigastric pain. No nausea, vomiting, or hematemesis; No diarrhea or constipation. No melena   GENITOURINARY: No dysuria, frequency, hematuria, or incontinence  NEUROLOGICAL: No headaches, memory loss, loss of strength, numbness, or tremors  SKIN: neg  LYMPH NODES: No enlarged glands  ENDOCRINE: No heat or cold intolerance; No hair loss  MUSCULOSKELETAL: Right  shoulder pain; swelling legs  PSYCHIATRIC: + depression  HEME/LYMPH: No easy bruising, or bleeding gums  ALLERY AND IMMUNOLOGIC: No hives or eczema      Vital Signs Last 24 Hrs  T(C): 36.8 (24 Dec 2021 11:36), Max: 36.8 (24 Dec 2021 11:36)  T(F): 98.3 (24 Dec 2021 11:36), Max: 98.3 (24 Dec 2021 11:36)  HR: 80 (24 Dec 2021 13:17) (71 - 83)  BP: 124/62 (24 Dec 2021 13:17) (115/80 - 125/84)  BP(mean): --  RR: 20 (24 Dec 2021 11:36) (18 - 27)  SpO2: 88% (24 Dec 2021 13:17) (88% - 98%)  Daily Height in cm: 182.88 (23 Dec 2021 18:27)        PHYSICAL EXAM:    GENERAL: appears chronically ill, oriented.  HEAD:  Atraumatic, Normocephalic  EYES: EOMI, PERRLA, conjunctiva and sclera clear  ENMT: No tonsillar erythema, exudates, or enlargement; Moist mucous membranes,   NECK: Supple, neck  veins wnl sitting up  NERVOUS SYSTEM:  Alert & Oriented X3, Good concentration; Motor Strength wnl upper and lower extremities;  CHEST/LUNG: Nasal 02, decreased bs both lower lungs  HEART: Regular rate and rhythm; No rub, pacer left chest  ABDOMEN: Soft, Nontender, distended; Bowel sounds present, body wall edema   EXTREMITIES:  +2 - +3 leg edema   LYMPH: No lymphadenopathy noted  SKIN: No rashes or lesions, pale  : Neg    LABS:                        14.8   5.34  )-----------( 283      ( 23 Dec 2021 21:40 )             45.7     12-24    124<L>  |  90<L>  |  72.4<H>  ----------------------------<  124<H>  5.2   |  17.0<L>  |  2.22<H>    Ca    8.9      24 Dec 2021 09:45  Mg     2.5     12-23    TPro  6.8  /  Alb  3.8  /  TBili  1.4  /  DBili  x   /  AST  42<H>  /  ALT  19  /  AlkPhos  227<H>  12-23    PT/INR - ( 23 Dec 2021 21:40 )   PT: 23.1 sec;   INR: 2.06 ratio         PTT - ( 23 Dec 2021 21:40 )  PTT:34.0 sec    Magnesium, Serum: 2.5 mg/dL (12-23 @ 21:40)          RADIOLOGY & ADDITIONAL TESTS:

## 2021-12-24 NOTE — PHYSICAL THERAPY INITIAL EVALUATION ADULT - PERTINENT HX OF CURRENT PROBLEM, REHAB EVAL
61yom with HF p/w right upper arm pain, right foot pain and increased shortness of breath from baseline. Admitted last month for exacerbation of CHF and new onset AF. This morning he woke up with a sharp, burning pain in the plantar side of the foot proximal to the toes. CT foot (-) for fx.

## 2021-12-24 NOTE — H&P ADULT - ASSESSMENT
61 year old male withpmh of cad, chf (combined) w/ reduced ef s/p ICD, substance abuse, coming to hospital with compalints of right arm and foot pain with increased shortness of breath. per patient states started 2 days prior and has been taking most of his medications however does not remember all of the names states has not changed since last discharge. states sob worsens with activity improves with rest. denies nausea vomiting diarrea or chest pain at this time.     #acute hypoxic respiratory failure   - 2/2 acute on chronic combined systolic and diastolic heart failure ?noncompliance  - echo 11/20/2021 moderate tricupic regurg lvef <20%  - cardio consult appreciated  - bumex  - strict i and o   - daily weight  - in patient with icd    #hyponatremia  - monitor na   - fluid restrict   - nephro consult    #DERRICK on CKD3  - monitor cr   - avoid nephrotoxic meds   - nephro consult pending   - hold losartan and spironolactone    #Atrial flutter  - amiodarone, eliquis  - hold bisoprolol given ?cocaine use in past -> check utox if negative can possible start coreg and metoprolol    #CAD  - aspirin statin     #Type 2 Diabetes  - a1c 6.3 11/20/2021  - iss   - monitor fingersticks   - w/ neuropathy- start gabapentin    #Substance use  - hx of cocaine use- patient stating has not used for long time   - patient stating thc use   - patient counselled on cessation       #DVT Prophylaxis  - eliquis  IMPROVE VTE Individual Risk Assessment  RISK                                                                Points  [  ] Previous VTE                                                  3  [  ] Thrombophilia                                               2  [  ] Lower limb paralysis                                      2        (unable to hold up >15 seconds)    [  ] Current Cancer                                              2         (within 6 months)  [  ] Immobilization > 24 hrs                                1  [  ] ICU/CCU stay > 24 hours                              1  [  ] Age > 60                                                      1    IMPROVE VTE Score ____0_____    IMPROVE Score 0-1: Low Risk, No VTE prophylaxis required for most patients, encourage ambulation.   IMPROVE Score 2-3: At risk, pharmacologic VTE prophylaxis is indicated for most patients (in the absence of a contraindication)  IMPROVE Score > or = 4: High Risk, pharmacologic VTE prophylaxis is indicated for most patients (in the absence of a contraindication)

## 2021-12-24 NOTE — H&P ADULT - NSHPPHYSICALEXAM_GEN_ALL_CORE
Vital Signs Last 24 Hrs  T(F): 98.3 (24 Dec 2021 11:36), Max: 98.3 (24 Dec 2021 11:36)  HR: 80 (24 Dec 2021 13:17) (71 - 83)  BP: 124/62 (24 Dec 2021 13:17) (115/80 - 125/84)  RR: 20 (24 Dec 2021 11:36) (18 - 27)  SpO2: 88% (24 Dec 2021 13:17) (88% - 98%)    Physical Exam:  Constitutional: NAD, awake and alert, well-developed  Neck: Soft and supple, No LAD, No JVD  Respiratory: +rhonchi; decreased breath sounds b/l base  Cardiovascular: +s1/s2 +1 edema b/l le  Gastrointestinal: soft nt nd bs+  Vascular: 2+ peripheral pulses  Neurological: A/O x 3, no focal deficits  Musculoskeletal: 4/5 strength b/l upper and lower extremities  : Contraindicated  Breasts: Contraindicated  Rectal: Contraindicated

## 2021-12-25 LAB
ALBUMIN SERPL ELPH-MCNC: 3.9 G/DL — SIGNIFICANT CHANGE UP (ref 3.3–5.2)
ALP SERPL-CCNC: 214 U/L — HIGH (ref 40–120)
ALT FLD-CCNC: 15 U/L — SIGNIFICANT CHANGE UP
AMPHET UR-MCNC: NEGATIVE — SIGNIFICANT CHANGE UP
ANION GAP SERPL CALC-SCNC: 18 MMOL/L — HIGH (ref 5–17)
APPEARANCE UR: CLEAR — SIGNIFICANT CHANGE UP
AST SERPL-CCNC: 22 U/L — SIGNIFICANT CHANGE UP
BARBITURATES UR SCN-MCNC: NEGATIVE — SIGNIFICANT CHANGE UP
BENZODIAZ UR-MCNC: NEGATIVE — SIGNIFICANT CHANGE UP
BILIRUB SERPL-MCNC: 2.3 MG/DL — HIGH (ref 0.4–2)
BILIRUB UR-MCNC: NEGATIVE — SIGNIFICANT CHANGE UP
BUN SERPL-MCNC: 77.1 MG/DL — HIGH (ref 8–20)
CALCIUM SERPL-MCNC: 8.6 MG/DL — SIGNIFICANT CHANGE UP (ref 8.6–10.2)
CHLORIDE SERPL-SCNC: 92 MMOL/L — LOW (ref 98–107)
CO2 SERPL-SCNC: 20 MMOL/L — LOW (ref 22–29)
COCAINE METAB.OTHER UR-MCNC: NEGATIVE — SIGNIFICANT CHANGE UP
COLOR SPEC: YELLOW — SIGNIFICANT CHANGE UP
CREAT SERPL-MCNC: 2.29 MG/DL — HIGH (ref 0.5–1.3)
DIFF PNL FLD: NEGATIVE — SIGNIFICANT CHANGE UP
GLUCOSE BLDC GLUCOMTR-MCNC: 119 MG/DL — HIGH (ref 70–99)
GLUCOSE BLDC GLUCOMTR-MCNC: 127 MG/DL — HIGH (ref 70–99)
GLUCOSE BLDC GLUCOMTR-MCNC: 141 MG/DL — HIGH (ref 70–99)
GLUCOSE BLDC GLUCOMTR-MCNC: 170 MG/DL — HIGH (ref 70–99)
GLUCOSE SERPL-MCNC: 119 MG/DL — HIGH (ref 70–99)
GLUCOSE UR QL: NEGATIVE MG/DL — SIGNIFICANT CHANGE UP
KETONES UR-MCNC: NEGATIVE — SIGNIFICANT CHANGE UP
LEUKOCYTE ESTERASE UR-ACNC: NEGATIVE — SIGNIFICANT CHANGE UP
MAGNESIUM SERPL-MCNC: 2.5 MG/DL — SIGNIFICANT CHANGE UP (ref 1.6–2.6)
METHADONE UR-MCNC: NEGATIVE — SIGNIFICANT CHANGE UP
NITRITE UR-MCNC: NEGATIVE — SIGNIFICANT CHANGE UP
OPIATES UR-MCNC: POSITIVE
OSMOLALITY UR: 349 MOSM/KG — SIGNIFICANT CHANGE UP (ref 300–1000)
PCP SPEC-MCNC: SIGNIFICANT CHANGE UP
PCP UR-MCNC: NEGATIVE — SIGNIFICANT CHANGE UP
PH UR: 6 — SIGNIFICANT CHANGE UP (ref 5–8)
PHOSPHATE SERPL-MCNC: 4.5 MG/DL — SIGNIFICANT CHANGE UP (ref 2.4–4.7)
POTASSIUM SERPL-MCNC: 5.1 MMOL/L — SIGNIFICANT CHANGE UP (ref 3.5–5.3)
POTASSIUM SERPL-SCNC: 5.1 MMOL/L — SIGNIFICANT CHANGE UP (ref 3.5–5.3)
POTASSIUM UR-SCNC: 30 MMOL/L — SIGNIFICANT CHANGE UP
PROT SERPL-MCNC: 6.7 G/DL — SIGNIFICANT CHANGE UP (ref 6.6–8.7)
PROT UR-MCNC: NEGATIVE — SIGNIFICANT CHANGE UP
SODIUM SERPL-SCNC: 130 MMOL/L — LOW (ref 135–145)
SODIUM UR-SCNC: <30 MMOL/L — SIGNIFICANT CHANGE UP
SP GR SPEC: 1.02 — SIGNIFICANT CHANGE UP (ref 1.01–1.02)
THC UR QL: POSITIVE
TSH SERPL-MCNC: 6.78 UIU/ML — HIGH (ref 0.27–4.2)
UROBILINOGEN FLD QL: NEGATIVE MG/DL — SIGNIFICANT CHANGE UP

## 2021-12-25 PROCEDURE — 99233 SBSQ HOSP IP/OBS HIGH 50: CPT

## 2021-12-25 PROCEDURE — 99232 SBSQ HOSP IP/OBS MODERATE 35: CPT

## 2021-12-25 PROCEDURE — 76700 US EXAM ABDOM COMPLETE: CPT | Mod: 26

## 2021-12-25 RX ORDER — ACETAMINOPHEN 500 MG
975 TABLET ORAL ONCE
Refills: 0 | Status: COMPLETED | OUTPATIENT
Start: 2021-12-25 | End: 2021-12-25

## 2021-12-25 RX ADMIN — Medication 81 MILLIGRAM(S): at 12:12

## 2021-12-25 RX ADMIN — APIXABAN 5 MILLIGRAM(S): 2.5 TABLET, FILM COATED ORAL at 16:40

## 2021-12-25 RX ADMIN — BUMETANIDE 2 MILLIGRAM(S): 0.25 INJECTION INTRAMUSCULAR; INTRAVENOUS at 05:30

## 2021-12-25 RX ADMIN — Medication 975 MILLIGRAM(S): at 23:45

## 2021-12-25 RX ADMIN — BUMETANIDE 2 MILLIGRAM(S): 0.25 INJECTION INTRAMUSCULAR; INTRAVENOUS at 20:42

## 2021-12-25 RX ADMIN — Medication 1300 MILLIGRAM(S): at 20:46

## 2021-12-25 RX ADMIN — GABAPENTIN 100 MILLIGRAM(S): 400 CAPSULE ORAL at 06:22

## 2021-12-25 RX ADMIN — GABAPENTIN 100 MILLIGRAM(S): 400 CAPSULE ORAL at 16:40

## 2021-12-25 RX ADMIN — ATORVASTATIN CALCIUM 40 MILLIGRAM(S): 80 TABLET, FILM COATED ORAL at 20:45

## 2021-12-25 RX ADMIN — GABAPENTIN 100 MILLIGRAM(S): 400 CAPSULE ORAL at 20:44

## 2021-12-25 RX ADMIN — Medication 1300 MILLIGRAM(S): at 05:29

## 2021-12-25 RX ADMIN — APIXABAN 5 MILLIGRAM(S): 2.5 TABLET, FILM COATED ORAL at 05:29

## 2021-12-25 RX ADMIN — Medication 1300 MILLIGRAM(S): at 16:40

## 2021-12-25 RX ADMIN — AMIODARONE HYDROCHLORIDE 200 MILLIGRAM(S): 400 TABLET ORAL at 05:29

## 2021-12-25 NOTE — PROGRESS NOTE ADULT - ASSESSMENT
60 y/o M PMHx of CAD, Nonischemic Cardiomyopathy - CHF (EF in 2016 15-20%) s/p ICD (reports placed about 5 months ago by Dr. Shaikh), nonobstructive CAD, HTN, Afib s/p VANESSA DCCV 11/22/2021 presents to ED with c/o R arm pain, R foot pain and increaseing SOB. ProBNP >27733. CXR with pulm congestion. Pt states taking all medications, but when probed further admits to only taking 1/2 doses Bumex at night, unclear if compliant with other medications, also states has not used cocaine/crack "in years".    12/25: Pt denies chest pain, palpitations, SOB. Tele-NSR HR 60-70s. Cont. current medication regimen.     Heart failure, acute decompensated  pro BNP >22K  Bumex 2mg IV BID  monitor I and Os  no BB 2/2 cocaine use  Hold losartan, spironolactone 2/2 DERRICK      DERRICK on CKD  BUN/Creatnin-77.1/2.29  nephrology consult appreciated  Hold losartan, spironolactone 2/2 DERRICK    AFib  VANESSA/DCCV 11/22/21  Cont amiodarone  Cont eliquis     Substance Use Disorder  denies cocaine "haven't used in years"  Urine Tox-  + THC, +Opiates

## 2021-12-25 NOTE — PROGRESS NOTE ADULT - SUBJECTIVE AND OBJECTIVE BOX
CHIEF COMPLAINT/INTERVAL HISTORY:    Patient is a 61y old  Male who presents with a chief complaint of CHF (24 Dec 2021 16:13)    SUBJECTIVE & OBJECTIVE: Pt seen and examined at bedside. No overnight events. Patient is a poor historian; sitting in bed. NAD. States breathing is "on and off." Continue IV bumex.     ROS: No chest pain, palpitations, light headedness, dizziness, headache, nausea/vomiting, fevers/chills, abdominal pain, dysuria.    ICU Vital Signs Last 24 Hrs  T(C): 37 (25 Dec 2021 08:46), Max: 37 (25 Dec 2021 08:46)  T(F): 98.6 (25 Dec 2021 08:46), Max: 98.6 (25 Dec 2021 08:46)  HR: 85 (25 Dec 2021 08:46) (69 - 100)  BP: 119/78 (25 Dec 2021 08:46) (103/72 - 125/84)  RR: 18 (25 Dec 2021 08:46) (18 - 20)  SpO2: 96% (25 Dec 2021 08:46) (93% - 97%)    MEDICATIONS  (STANDING):  aMIOdarone    Tablet 200 milliGRAM(s) Oral daily  apixaban 5 milliGRAM(s) Oral every 12 hours  aspirin enteric coated 81 milliGRAM(s) Oral daily  atorvastatin 40 milliGRAM(s) Oral at bedtime  buMETAnide Injectable 2 milliGRAM(s) IV Push every 12 hours  dextrose 40% Gel 15 Gram(s) Oral once  dextrose 5%. 1000 milliLiter(s) (50 mL/Hr) IV Continuous <Continuous>  dextrose 5%. 1000 milliLiter(s) (100 mL/Hr) IV Continuous <Continuous>  dextrose 50% Injectable 25 Gram(s) IV Push once  dextrose 50% Injectable 12.5 Gram(s) IV Push once  dextrose 50% Injectable 25 Gram(s) IV Push once  gabapentin 100 milliGRAM(s) Oral three times a day  glucagon  Injectable 1 milliGRAM(s) IntraMuscular once  influenza   Vaccine 0.5 milliLiter(s) IntraMuscular once  insulin lispro (ADMELOG) corrective regimen sliding scale   SubCutaneous three times a day before meals  insulin lispro (ADMELOG) corrective regimen sliding scale   SubCutaneous at bedtime  sodium bicarbonate 1300 milliGRAM(s) Oral three times a day    MEDICATIONS  (PRN):  aluminum hydroxide/magnesium hydroxide/simethicone Suspension 30 milliLiter(s) Oral every 4 hours PRN Dyspepsia  melatonin 3 milliGRAM(s) Oral at bedtime PRN Insomnia  ondansetron Injectable 4 milliGRAM(s) IV Push every 8 hours PRN Nausea and/or Vomiting      LABS:                        14.8   5.34  )-----------( 283      ( 23 Dec 2021 21:40 )             45.7     12-    130<L>  |  92<L>  |  77.1<H>  ----------------------------<  119<H>  5.1   |  20.0<L>  |  2.29<H>    Ca    8.6      25 Dec 2021 07:33  Phos  4.5     12  Mg     2.5         TPro  6.7  /  Alb  3.9  /  TBili  2.3<H>  /  DBili  x   /  AST  22  /  ALT  15  /  AlkPhos  214<H>  1225    PT/INR - ( 23 Dec 2021 21:40 )   PT: 23.1 sec;   INR: 2.06 ratio         PTT - ( 23 Dec 2021 21:40 )  PTT:34.0 sec  Urinalysis Basic - ( 25 Dec 2021 05:53 )    Color: Yellow / Appearance: Clear / S.020 / pH: x  Gluc: x / Ketone: Negative  / Bili: Negative / Urobili: Negative mg/dL   Blood: x / Protein: Negative / Nitrite: Negative   Leuk Esterase: Negative / RBC: x / WBC x   Sq Epi: x / Non Sq Epi: x / Bacteria: x      CAPILLARY BLOOD GLUCOSE      POCT Blood Glucose.: 141 mg/dL (25 Dec 2021 12:09)  POCT Blood Glucose.: 119 mg/dL (25 Dec 2021 08:20)  POCT Blood Glucose.: 116 mg/dL (24 Dec 2021 22:07)  POCT Blood Glucose.: 93 mg/dL (24 Dec 2021 16:47)      PHYSICAL EXAM:    GENERAL: elderly male, sitting in bed, NAD  HEAD:  Atraumatic, Normocephalic  EYES: EOMI, PERRLA, conjunctiva and sclera clear  ENMT: Moist mucous membranes  NECK: Supple   NERVOUS SYSTEM:  Alert & Oriented X3   CHEST/LUNG: bilateral air entry  HEART: Regular rate and rhythm; +S1/S2  ABDOMEN: Soft, Nontender, Nondistended; Bowel sounds present  EXTREMITIES:  + pedal edema

## 2021-12-25 NOTE — PROGRESS NOTE ADULT - SUBJECTIVE AND OBJECTIVE BOX
[facility]                                                               Office: [address] [address]                                                              Telephone: [phone]. Fax:[phone]                                                                             PROGRESS NOTE  Reason for follow up: CHF  Overnight: No new events.   Update:  12/25: Pt denies chest pain, palpitations, SOB. Tele-NSR HR 60-70s. Cont. current medication regimen.       Subjective: No new events      	  Vitals:  T(C): 36.6 (12-25-21 @ 16:40), Max: 37 (12-25-21 @ 08:46)  HR: 76 (12-25-21 @ 16:40) (69 - 85)  BP: 109/74 (12-25-21 @ 16:40) (103/72 - 119/78)  RR: 18 (12-25-21 @ 16:40) (18 - 18)  SpO2: 96% (12-25-21 @ 16:40) (93% - 97%)    I&O's Summary      PHYSICAL EXAM:  Appearance: Comfortable. No acute distress  HEENT:  Head and neck: Atraumatic. Normocephalic.  Normal oral mucosa, PERRL, Neck is supple. No JVD, No carotid bruit.   Neurologic: A & O x 3, no focal deficits. EOMI.  Lymphatic: No cervical lymphadenopathy  Cardiovascular: Normal S1 S2, No murmur, rubs/gallops. No JVD, No edema  Respiratory: Lungs clear to auscultation  Gastrointestinal:  Soft, Non-tender, + BS  Lower Extremities: bilateral lower extremity pitting +3/+3 edema  Psychiatry: Patient is calm. No agitation. Mood & affect appropriate  Skin: No rashes/ ecchymoses/cyanosis/ulcers visualized on the face, hands or feet      CURRENT MEDICATIONS:  aMIOdarone    Tablet 200 milliGRAM(s) Oral daily  buMETAnide Injectable 2 milliGRAM(s) IV Push every 12 hours  gabapentin  atorvastatin  dextrose 40% Gel  dextrose 50% Injectable  dextrose 50% Injectable  dextrose 50% Injectable  glucagon  Injectable  insulin lispro (ADMELOG) corrective regimen sliding scale  insulin lispro (ADMELOG) corrective regimen sliding scale  apixaban  aspirin enteric coated  dextrose 5%.  dextrose 5%.  influenza   Vaccine  sodium bicarbonate      DIAGNOSTIC TESTING:    [ ] Echocardiogram: < from: VANESSA Echo Doppler (11.22.21 @ 18:34) >  Summary:   1. Very limited study due to patient hemodynamic instability.   2. Left ventricular ejection fraction, by visual estimation, is <20% with dilated cardiomyopathy   3. Limited visualization of the left atrial appendage, no evidence of thrombus.      [ ]  Catheterization:< from: Cardiac Cath Lab - Adult (06.22.16 @ 12:51) >  CORONARY VESSELS: The coronary circulation is right dominant.  LM:   --  LM: Angiography showed minor luminal irregularities with no flow  limiting lesions.  LAD:   --  Mid LAD: There was a 50 % stenosis.  CX:   --  Circumflex: Angiography showed minor luminal irregularities with  no flow limiting lesions.  RCA:   --  Proximal RCA: There was a 60 % stenosis.  --  Mid RCA: There was a 85 % stenosis.  COMPLICATIONS: There were no complications.      OTHER: 	    US:< from: US Abdomen Complete (US Abdomen Complete .) (12.25.21 @ 11:36) >  IMPRESSION: Nodular liver suggests cirrhosis  Moderate amount of ascites      LABS:	 	  CARDIAC MARKERS ( 23 Dec 2021 21:40 )  x     / <0.01 ng/mL / x     / x     / x      p-BNP 23 Dec 2021 21:40: 23007 pg/mL                          14.8   5.34  )-----------( 283      ( 23 Dec 2021 21:40 )             45.7     12-25    130<L>  |  92<L>  |  77.1<H>  ----------------------------<  119<H>  5.1   |  20.0<L>  |  2.29<H>    Ca    8.6      25 Dec 2021 07:33  Phos  4.5     12-25  Mg     2.5     12-25    TPro  6.7  /  Alb  3.9  /  TBili  2.3<H>  /  DBili  x   /  AST  22  /  ALT  15  /  AlkPhos  214<H>  12-25    proBNP: Serum Pro-Brain Natriuretic Peptide: 33382 pg/mL (12-23 @ 21:40)    TSH: Thyroid Stimulating Hormone, Serum: 6.78 uIU/mL    TELEMETRY: NSR HR 60-70s

## 2021-12-25 NOTE — PROGRESS NOTE ADULT - ASSESSMENT
61 year old male with history of CAD, Severely Dilated CM/HFrEF s/p ICD, Substance abuse. Atrial Flutter on Eliquis, ETOH use who presented to the ED with complaints of right arm and foot pain with increased shortness of breath due to medication noncompliace and admitted with decompensated heart failure.     #Acute Hypoxic Respiratory Failure secondary to Severely Dilated Cardiomyopathy/HFrEF  - remains hypervolemic although hypoxia has resolved  - continue humex  - TTE 11/20/2021 moderate TR and EF <20%  - avoid ACE/ARB due to DERRICK  - strict I/os, daily weights  - f/u further cardio recs    #Hypervolemic Hyponatremia in the setting of decompensated heart failure  -sodium improved to 130 from 124  -urine lytes reviewed  -continue bumex  -free water restriction  -monitor I/os  -renal consult appreciated    #DERRICK on CKD stage 3  -likely due to cardiorenal syndrome and use of ARB  -baseline creatinine 1.5-1.6  -creatinine 2.25 today  -continue bumex and will need to accept a degree of azotemia  -avoid ACE/ARB at this time  -UA bland, urine sodium < 30  -continue to hold losartan and aldactone  -continue sodium bicarb TID  -avoid nephrotoxic agents and renally dose medications   -strict I/os, daily weights  -f/u further renal recommendations    #Atrial flutter  - continue amiodarone and eliquis    #CAD  -asymptomatic  - continue aspirin and statin      #DM  -HbA1c 6.3  -ISS  -ADA diet    #Neuropathy  -continue gabapentin    #Substance use  - hx of cocaine use- patient stating has not used for long time   - UDS positive for THC and opoids  - cessation discussed     #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 PT home with assist vs [facility]; f/u final recs

## 2021-12-26 LAB
ALBUMIN SERPL ELPH-MCNC: 3.8 G/DL — SIGNIFICANT CHANGE UP (ref 3.3–5.2)
ALP SERPL-CCNC: 239 U/L — HIGH (ref 40–120)
ALT FLD-CCNC: 17 U/L — SIGNIFICANT CHANGE UP
ANION GAP SERPL CALC-SCNC: 16 MMOL/L — SIGNIFICANT CHANGE UP (ref 5–17)
APTT BLD: 37 SEC — HIGH (ref 27.5–35.5)
AST SERPL-CCNC: 27 U/L — SIGNIFICANT CHANGE UP
BASOPHILS # BLD AUTO: 0.03 K/UL — SIGNIFICANT CHANGE UP (ref 0–0.2)
BASOPHILS NFR BLD AUTO: 0.5 % — SIGNIFICANT CHANGE UP (ref 0–2)
BILIRUB SERPL-MCNC: 1.1 MG/DL — SIGNIFICANT CHANGE UP (ref 0.4–2)
BUN SERPL-MCNC: 76.6 MG/DL — HIGH (ref 8–20)
CALCIUM SERPL-MCNC: 8.5 MG/DL — LOW (ref 8.6–10.2)
CHLORIDE SERPL-SCNC: 92 MMOL/L — LOW (ref 98–107)
CO2 SERPL-SCNC: 22 MMOL/L — SIGNIFICANT CHANGE UP (ref 22–29)
CREAT SERPL-MCNC: 2.02 MG/DL — HIGH (ref 0.5–1.3)
EOSINOPHIL # BLD AUTO: 0.04 K/UL — SIGNIFICANT CHANGE UP (ref 0–0.5)
EOSINOPHIL NFR BLD AUTO: 0.7 % — SIGNIFICANT CHANGE UP (ref 0–6)
GLUCOSE BLDC GLUCOMTR-MCNC: 103 MG/DL — HIGH (ref 70–99)
GLUCOSE BLDC GLUCOMTR-MCNC: 113 MG/DL — HIGH (ref 70–99)
GLUCOSE BLDC GLUCOMTR-MCNC: 146 MG/DL — HIGH (ref 70–99)
GLUCOSE BLDC GLUCOMTR-MCNC: 171 MG/DL — HIGH (ref 70–99)
GLUCOSE SERPL-MCNC: 99 MG/DL — SIGNIFICANT CHANGE UP (ref 70–99)
HCT VFR BLD CALC: 43.7 % — SIGNIFICANT CHANGE UP (ref 39–50)
HGB BLD-MCNC: 14.3 G/DL — SIGNIFICANT CHANGE UP (ref 13–17)
IMM GRANULOCYTES NFR BLD AUTO: 0.3 % — SIGNIFICANT CHANGE UP (ref 0–1.5)
INR BLD: 3.2 RATIO — HIGH (ref 0.88–1.16)
LYMPHOCYTES # BLD AUTO: 0.86 K/UL — LOW (ref 1–3.3)
LYMPHOCYTES # BLD AUTO: 14.6 % — SIGNIFICANT CHANGE UP (ref 13–44)
MAGNESIUM SERPL-MCNC: 2.4 MG/DL — SIGNIFICANT CHANGE UP (ref 1.6–2.6)
MCHC RBC-ENTMCNC: 28.4 PG — SIGNIFICANT CHANGE UP (ref 27–34)
MCHC RBC-ENTMCNC: 32.7 GM/DL — SIGNIFICANT CHANGE UP (ref 32–36)
MCV RBC AUTO: 86.7 FL — SIGNIFICANT CHANGE UP (ref 80–100)
MONOCYTES # BLD AUTO: 0.81 K/UL — SIGNIFICANT CHANGE UP (ref 0–0.9)
MONOCYTES NFR BLD AUTO: 13.8 % — SIGNIFICANT CHANGE UP (ref 2–14)
NEUTROPHILS # BLD AUTO: 4.12 K/UL — SIGNIFICANT CHANGE UP (ref 1.8–7.4)
NEUTROPHILS NFR BLD AUTO: 70.1 % — SIGNIFICANT CHANGE UP (ref 43–77)
PHOSPHATE SERPL-MCNC: 3 MG/DL — SIGNIFICANT CHANGE UP (ref 2.4–4.7)
PLATELET # BLD AUTO: 239 K/UL — SIGNIFICANT CHANGE UP (ref 150–400)
POTASSIUM SERPL-MCNC: 4.6 MMOL/L — SIGNIFICANT CHANGE UP (ref 3.5–5.3)
POTASSIUM SERPL-SCNC: 4.6 MMOL/L — SIGNIFICANT CHANGE UP (ref 3.5–5.3)
PROT SERPL-MCNC: 6.6 G/DL — SIGNIFICANT CHANGE UP (ref 6.6–8.7)
PROTHROM AB SERPL-ACNC: 35.1 SEC — HIGH (ref 10.6–13.6)
RBC # BLD: 5.04 M/UL — SIGNIFICANT CHANGE UP (ref 4.2–5.8)
RBC # FLD: 15.8 % — HIGH (ref 10.3–14.5)
SODIUM SERPL-SCNC: 130 MMOL/L — LOW (ref 135–145)
T3FREE SERPL-MCNC: 2 PG/ML — SIGNIFICANT CHANGE UP (ref 1.8–4.6)
T4 FREE SERPL-MCNC: 1.5 NG/DL — SIGNIFICANT CHANGE UP (ref 0.9–1.8)
WBC # BLD: 5.88 K/UL — SIGNIFICANT CHANGE UP (ref 3.8–10.5)
WBC # FLD AUTO: 5.88 K/UL — SIGNIFICANT CHANGE UP (ref 3.8–10.5)

## 2021-12-26 PROCEDURE — 99233 SBSQ HOSP IP/OBS HIGH 50: CPT

## 2021-12-26 RX ORDER — ACETAMINOPHEN 500 MG
650 TABLET ORAL ONCE
Refills: 0 | Status: COMPLETED | OUTPATIENT
Start: 2021-12-26 | End: 2021-12-26

## 2021-12-26 RX ADMIN — Medication 1300 MILLIGRAM(S): at 12:09

## 2021-12-26 RX ADMIN — Medication 3 MILLIGRAM(S): at 21:28

## 2021-12-26 RX ADMIN — Medication 650 MILLIGRAM(S): at 22:08

## 2021-12-26 RX ADMIN — Medication 1300 MILLIGRAM(S): at 21:28

## 2021-12-26 RX ADMIN — GABAPENTIN 100 MILLIGRAM(S): 400 CAPSULE ORAL at 05:07

## 2021-12-26 RX ADMIN — Medication 650 MILLIGRAM(S): at 21:27

## 2021-12-26 RX ADMIN — BUMETANIDE 2 MILLIGRAM(S): 0.25 INJECTION INTRAMUSCULAR; INTRAVENOUS at 05:04

## 2021-12-26 RX ADMIN — APIXABAN 5 MILLIGRAM(S): 2.5 TABLET, FILM COATED ORAL at 05:05

## 2021-12-26 RX ADMIN — ATORVASTATIN CALCIUM 40 MILLIGRAM(S): 80 TABLET, FILM COATED ORAL at 21:28

## 2021-12-26 RX ADMIN — BUMETANIDE 2 MILLIGRAM(S): 0.25 INJECTION INTRAMUSCULAR; INTRAVENOUS at 17:56

## 2021-12-26 RX ADMIN — Medication 81 MILLIGRAM(S): at 12:09

## 2021-12-26 RX ADMIN — AMIODARONE HYDROCHLORIDE 200 MILLIGRAM(S): 400 TABLET ORAL at 05:05

## 2021-12-26 RX ADMIN — Medication 1300 MILLIGRAM(S): at 05:05

## 2021-12-26 RX ADMIN — GABAPENTIN 100 MILLIGRAM(S): 400 CAPSULE ORAL at 21:27

## 2021-12-26 RX ADMIN — APIXABAN 5 MILLIGRAM(S): 2.5 TABLET, FILM COATED ORAL at 17:06

## 2021-12-26 RX ADMIN — GABAPENTIN 100 MILLIGRAM(S): 400 CAPSULE ORAL at 12:08

## 2021-12-26 NOTE — PROGRESS NOTE ADULT - ASSESSMENT
61 year old male with history of CAD, Severely Dilated CM/HFrEF s/p ICD, Substance abuse. Atrial Flutter on Eliquis, ETOH use who presented to the ED with complaints of right arm and foot pain with increased shortness of breath due to medication noncompliace and admitted with decompensated heart failure.     #Acute Hypoxic Respiratory Failure secondary to Severely Dilated Cardiomyopathy/HFrEF  - remains hypervolemic although hypoxia has resolved  - continue bumex IV BID  - TTE 11/20/2021 moderate TR and EF <20%  - avoid ACE/ARB due to DERRICK  - strict I/os, daily weights  - cardio recs appreciated    #Hypervolemic Hyponatremia in the setting of decompensated heart failure  -sodium stable at 130  -urine lytes reviewed  -continue bumex  -free water restriction reinforced  -monitor I/os  -renal consult appreciated    #DERRICK on CKD stage 3  -likely due to cardiorenal syndrome and use of ARB  -baseline creatinine 1.5-1.6  -creatinine 2.02 today  -continue bumex and monitor renal function closely  -avoid ACE/ARB at this time  -UA bland, urine sodium < 30  -continue to hold losartan and aldactone  -continue sodium bicarb TID; serum sodium 22  -avoid nephrotoxic agents and renally dose medications   -strict I/os, daily weights  -f/u further renal recommendations    #Atrial flutter  - continue amiodarone and eliquis  - INR elevated in the setting of liver disease  - Child Veras Score B as discussed with cardio, therefore can proceed with eliquis    #CAD  -asymptomatic  - continue aspirin and statin      #DM  -HbA1c 6.3  -ISS  -ADA diet    #Neuropathy  -continue gabapentin    #Substance use  - hx of cocaine use- patient stating has not used for long time   - UDS positive for THC and opoids  - cessation discussed     #Cirrhosis likely ETOH related   -patient reports cutting down ETOH intake  -abdominal sono with cirrhotic morphology and moderate ascites  -INR elevated; will hold off on vitamin K given the patient is on eliquis  -monitor LFTs  -continue diuretics  -outpatient screening EGD for varices  -will need hepatology referral as outpatient  -alcohol cessation  -will discuss paracentesis with IR given elevated INR    #Elevated TSH  -normal free T3/T4  -no indication for synthroid    DVT ppx - Eliquis    Dispo - Continue IV bumex. PT home with assist vs [facility]IN[facility]O; f/u final recs.    Plan discussed with patient, Dr. Pelletier, RN

## 2021-12-26 NOTE — PROGRESS NOTE ADULT - SUBJECTIVE AND OBJECTIVE BOX
CHIEF COMPLAINT/INTERVAL HISTORY:    Patient is a 61y old  Male who presents with a chief complaint of CHF (26 Dec 2021 14:36)    SUBJECTIVE & OBJECTIVE: Pt seen and examined at bedside. No overnight events. Sitting in bed, denies CP or SOB. Complaining of right arm pain where previous IV was. Ecchymosis noted; no hematoma and no swelling.     ROS: No chest pain, palpitations, SOB, light headedness, dizziness, headache, nausea/vomiting, fevers/chills, abdominal pain, dysuria.    ICU Vital Signs Last 24 Hrs  T(C): 36.4 (26 Dec 2021 13:13), Max: 36.6 (25 Dec 2021 16:40)  T(F): 97.5 (26 Dec 2021 13:13), Max: 97.9 (25 Dec 2021 16:40)  HR: 87 (26 Dec 2021 13:13) (68 - 87)  BP: 116/83 (26 Dec 2021 13:13) (109/74 - 116/83)  RR: 18 (26 Dec 2021 13:13) (18 - 18)  SpO2: 99% (26 Dec 2021 13:13) (91% - 99%)      MEDICATIONS  (STANDING):  aMIOdarone    Tablet 200 milliGRAM(s) Oral daily  apixaban 5 milliGRAM(s) Oral every 12 hours  aspirin enteric coated 81 milliGRAM(s) Oral daily  atorvastatin 40 milliGRAM(s) Oral at bedtime  buMETAnide Injectable 2 milliGRAM(s) IV Push every 12 hours  dextrose 40% Gel 15 Gram(s) Oral once  dextrose 5%. 1000 milliLiter(s) (50 mL/Hr) IV Continuous <Continuous>  dextrose 5%. 1000 milliLiter(s) (100 mL/Hr) IV Continuous <Continuous>  dextrose 50% Injectable 25 Gram(s) IV Push once  dextrose 50% Injectable 12.5 Gram(s) IV Push once  dextrose 50% Injectable 25 Gram(s) IV Push once  gabapentin 100 milliGRAM(s) Oral three times a day  glucagon  Injectable 1 milliGRAM(s) IntraMuscular once  influenza   Vaccine 0.5 milliLiter(s) IntraMuscular once  insulin lispro (ADMELOG) corrective regimen sliding scale   SubCutaneous three times a day before meals  insulin lispro (ADMELOG) corrective regimen sliding scale   SubCutaneous at bedtime  sodium bicarbonate 1300 milliGRAM(s) Oral three times a day    MEDICATIONS  (PRN):  aluminum hydroxide/magnesium hydroxide/simethicone Suspension 30 milliLiter(s) Oral every 4 hours PRN Dyspepsia  melatonin 3 milliGRAM(s) Oral at bedtime PRN Insomnia  ondansetron Injectable 4 milliGRAM(s) IV Push every 8 hours PRN Nausea and/or Vomiting      LABS:                        14.3   5.88  )-----------( 239      ( 26 Dec 2021 09:22 )             43.7         130<L>  |  92<L>  |  76.6<H>  ----------------------------<  99  4.6   |  22.0  |  2.02<H>    Ca    8.5<L>      26 Dec 2021 09:22  Phos  3.0       Mg     2.4         TPro  6.6  /  Alb  3.8  /  TBili  1.1  /  DBili  x   /  AST  27  /  ALT  17  /  AlkPhos  239<H>  12-    PT/INR - ( 26 Dec 2021 09:22 )   PT: 35.1 sec;   INR: 3.20 ratio         PTT - ( 26 Dec 2021 09:22 )  PTT:37.0 sec  Urinalysis Basic - ( 25 Dec 2021 05:53 )    Color: Yellow / Appearance: Clear / S.020 / pH: x  Gluc: x / Ketone: Negative  / Bili: Negative / Urobili: Negative mg/dL   Blood: x / Protein: Negative / Nitrite: Negative   Leuk Esterase: Negative / RBC: x / WBC x   Sq Epi: x / Non Sq Epi: x / Bacteria: x      CAPILLARY BLOOD GLUCOSE      POCT Blood Glucose.: 146 mg/dL (26 Dec 2021 11:37)  POCT Blood Glucose.: 113 mg/dL (26 Dec 2021 07:46)  POCT Blood Glucose.: 170 mg/dL (25 Dec 2021 20:34)  POCT Blood Glucose.: 127 mg/dL (25 Dec 2021 16:38)      PHYSICAL EXAM:    GENERAL: elderly male, sitting in bed, NAD  HEAD:  Atraumatic, Normocephalic  EYES: EOMI, PERRLA, conjunctiva and sclera clear  ENMT: Moist mucous membranes  NECK: Supple   NERVOUS SYSTEM:  Alert & Oriented X3   CHEST/LUNG: bilateral air entry  HEART: Regular rate and rhythm; +S1/S2  ABDOMEN: Soft, Nontender, Nondistended; Bowel sounds present  EXTREMITIES:  + pedal edema

## 2021-12-26 NOTE — PROGRESS NOTE ADULT - SUBJECTIVE AND OBJECTIVE BOX
[place] [facility]                                                               Office: [address] [address]                                                              Telephone: [phone]. Fax:[phone]                                                                             PROGRESS NOTE  Reason for follow up: CHF  Overnight: No new events.   Update: 12/26: Pt denies chest pain, palpitations, SOB. Tele-NSR HR @ 60-70s, intermittent pacing. Cont. current medication regimen. Start Ace Wrap.      Subjective: No new events    	  Vitals:  T(C): 36.4 (12-26-21 @ 13:13), Max: 36.6 (12-25-21 @ 16:40)  HR: 87 (12-26-21 @ 13:13) (68 - 87)  BP: 116/83 (12-26-21 @ 13:13) (109/74 - 116/83)  RR: 18 (12-26-21 @ 13:13) (18 - 18)  SpO2: 99% (12-26-21 @ 13:13) (91% - 99%)    I&O's Summary    25 Dec 2021 07:01  -  26 Dec 2021 07:00  --------------------------------------------------------  IN: 0 mL / OUT: 250 mL / NET: -250 mL      PHYSICAL EXAM:  Appearance: Comfortable. No acute distress  HEENT:  Head and neck: Atraumatic. Normocephalic.  Normal oral mucosa, PERRL, Neck is supple. No JVD, No carotid bruit.   Neurologic: A & O x 3, no focal deficits. EOMI.  Lymphatic: No cervical lymphadenopathy  Cardiovascular: Normal S1 S2, No murmur, rubs/gallops. No JVD, No edema  Respiratory: RLL rales, Right lung diminished  Gastrointestinal:  Soft, Non-tender, + BS, slight abdomen distaention  Lower Extremities: lower extremity edema, +2/+2 pitting  Psychiatry: Patient is calm. No agitation. Mood & affect appropriate  Skin: No rashes/ ecchymoses/cyanosis/ulcers visualized on the face, hands or feet.      CURRENT MEDICATIONS:  aMIOdarone    Tablet 200 milliGRAM(s) Oral daily  buMETAnide Injectable 2 milliGRAM(s) IV Push every 12 hours  gabapentin  atorvastatin  dextrose 40% Gel  dextrose 50% Injectable  dextrose 50% Injectable  dextrose 50% Injectable  glucagon  Injectable  insulin lispro (ADMELOG) corrective regimen sliding scale  insulin lispro (ADMELOG) corrective regimen sliding scale  apixaban  aspirin enteric coated  dextrose 5%.  dextrose 5%.  influenza   Vaccine  sodium bicarbonate      DIAGNOSTIC TESTING:    [ ] Echocardiogram: < from: VANESSA Echo Doppler (11.22.21 @ 18:34) >  Summary:   1. Very limited study due to patient hemodynamic instability.   2. Left ventricular ejection fraction, by visual estimation, is <20% with dilated cardiomyopathy   3. Limited visualization of the left atrial appendage, no evidence of thrombus.      [ ]  Catheterization:< from: Cardiac Cath Lab - Adult (06.22.16 @ 12:51) >  CORONARY VESSELS: The coronary circulation is right dominant.  LM:   --  LM: Angiography showed minor luminal irregularities with no flow  limiting lesions.  LAD:   --  Mid LAD: There was a 50 % stenosis.  CX:   --  Circumflex: Angiography showed minor luminal irregularities with  no flow limiting lesions.  RCA:   --  Proximal RCA: There was a 60 % stenosis.  --  Mid RCA: There was a 85 % stenosis.  COMPLICATIONS: There were no complications.  DIAGNOSTIC RECOMMENDATIONS: The patient should continue with the present  medications.      OTHER: 	    US:< from: US Abdomen Complete (US Abdomen Complete .) (12.25.21 @ 11:36) >  IMPRESSION: Nodular liver suggests cirrhosis  Moderate amount of ascites    < from: US Duplex Venous Lower Ext Complete, Bilateral (12.23.21 @ 23:08) >  IMPRESSION:  No evidence of deep venous thrombosis in either lower extremity.      Xray:< from: Xray Foot AP + Lateral + Oblique, Right (12.23.21 @ 20:25) >  FINDINGS:    The bones and joint spaces are intact.  No fracture or dislocation.  Soft tissues unremarkable.  There are diffuse arterial vascular wall calcifications.  IMPRESSION:    No acute radiographic osseous pathology.      LABS:	 	  CARDIAC MARKERS ( 23 Dec 2021 21:40 )  x     / <0.01 ng/mL / x     / x     / x      p-BNP 23 Dec 2021 21:40: 25026 pg/mL                          14.3   5.88  )-----------( 239      ( 26 Dec 2021 09:22 )             43.7     12-26    130<L>  |  92<L>  |  76.6<H>  ----------------------------<  99  4.6   |  22.0  |  2.02<H>    Ca    8.5<L>      26 Dec 2021 09:22  Phos  3.0     12-26  Mg     2.4     12-26    TPro  6.6  /  Alb  3.8  /  TBili  1.1  /  DBili  x   /  AST  27  /  ALT  17  /  AlkPhos  239<H>  12-26    proBNP: Serum Pro-Brain Natriuretic Peptide: 20529 pg/mL (12-23 @ 21:40)       TSH: Thyroid Stimulating Hormone, Serum: 6.78 uIU/mL    TELEMETRY: NSR HR @ 60-70s, intermittent pacing

## 2021-12-26 NOTE — PROGRESS NOTE ADULT - ASSESSMENT
62 y/o M PMHx of CAD, Nonischemic Cardiomyopathy - CHF (EF in 2016 15-20%) s/p ICD (reports placed about 5 months ago by Dr. Shaikh), nonobstructive CAD, HTN, Afib s/p VANESSA DCCV 11/22/2021 presents to ED with c/o R arm pain, R foot pain and increaseing SOB. ProBNP >61545. CXR with pulm congestion. Pt states taking all medications, but when probed further admits to only taking 1/2 doses Bumex at night, unclear if compliant with other medications, also states has not used cocaine/crack "in years".    12/26: Pt denies chest pain, palpitations, SOB. Tele-NSR HR @ 60-70s, intermittent pacing. Cont. current medication regimen. Start Ace Wrap.      Heart failure, acute decompensated  pro BNP >22K  Bumex 2mg IV BID  monitor I and Os  no BB 2/2 cocaine use  Hold losartan, spironolactone 2/2 DERRICK      DERRICK on CKD  BUN/Creatnin-76.6/2.02  nephrology consult appreciated   Hold losartan, spironolactone 2/2 DERRICK    AFib  VANESSA/DCCV 11/22/21  Cont Amiodarone  Cont Eliquis     Substance Use Disorder  denies cocaine "haven't used in years"  Urine Tox-  + THC, +Opiates

## 2021-12-27 DIAGNOSIS — R18.8 OTHER ASCITES: ICD-10-CM

## 2021-12-27 DIAGNOSIS — M79.601 PAIN IN RIGHT ARM: ICD-10-CM

## 2021-12-27 DIAGNOSIS — Z86.79 PERSONAL HISTORY OF OTHER DISEASES OF THE CIRCULATORY SYSTEM: ICD-10-CM

## 2021-12-27 DIAGNOSIS — I50.23 ACUTE ON CHRONIC SYSTOLIC (CONGESTIVE) HEART FAILURE: ICD-10-CM

## 2021-12-27 DIAGNOSIS — N17.9 ACUTE KIDNEY FAILURE, UNSPECIFIED: ICD-10-CM

## 2021-12-27 LAB
ANION GAP SERPL CALC-SCNC: 14 MMOL/L — SIGNIFICANT CHANGE UP (ref 5–17)
APTT BLD: 36 SEC — HIGH (ref 27.5–35.5)
BASOPHILS # BLD AUTO: 0.04 K/UL — SIGNIFICANT CHANGE UP (ref 0–0.2)
BASOPHILS NFR BLD AUTO: 0.7 % — SIGNIFICANT CHANGE UP (ref 0–2)
BUN SERPL-MCNC: 71.9 MG/DL — HIGH (ref 8–20)
CALCIUM SERPL-MCNC: 8.1 MG/DL — LOW (ref 8.6–10.2)
CHLORIDE SERPL-SCNC: 93 MMOL/L — LOW (ref 98–107)
CO2 SERPL-SCNC: 23 MMOL/L — SIGNIFICANT CHANGE UP (ref 22–29)
CREAT SERPL-MCNC: 1.75 MG/DL — HIGH (ref 0.5–1.3)
EOSINOPHIL # BLD AUTO: 0.04 K/UL — SIGNIFICANT CHANGE UP (ref 0–0.5)
EOSINOPHIL NFR BLD AUTO: 0.7 % — SIGNIFICANT CHANGE UP (ref 0–6)
GLUCOSE BLDC GLUCOMTR-MCNC: 118 MG/DL — HIGH (ref 70–99)
GLUCOSE BLDC GLUCOMTR-MCNC: 119 MG/DL — HIGH (ref 70–99)
GLUCOSE BLDC GLUCOMTR-MCNC: 120 MG/DL — HIGH (ref 70–99)
GLUCOSE BLDC GLUCOMTR-MCNC: 99 MG/DL — SIGNIFICANT CHANGE UP (ref 70–99)
GLUCOSE SERPL-MCNC: 138 MG/DL — HIGH (ref 70–99)
HCT VFR BLD CALC: 41.5 % — SIGNIFICANT CHANGE UP (ref 39–50)
HGB BLD-MCNC: 13.3 G/DL — SIGNIFICANT CHANGE UP (ref 13–17)
IMM GRANULOCYTES NFR BLD AUTO: 0.6 % — SIGNIFICANT CHANGE UP (ref 0–1.5)
INR BLD: 2.15 RATIO — HIGH (ref 0.88–1.16)
LYMPHOCYTES # BLD AUTO: 0.58 K/UL — LOW (ref 1–3.3)
LYMPHOCYTES # BLD AUTO: 10.7 % — LOW (ref 13–44)
MAGNESIUM SERPL-MCNC: 2.3 MG/DL — SIGNIFICANT CHANGE UP (ref 1.6–2.6)
MCHC RBC-ENTMCNC: 27.9 PG — SIGNIFICANT CHANGE UP (ref 27–34)
MCHC RBC-ENTMCNC: 32 GM/DL — SIGNIFICANT CHANGE UP (ref 32–36)
MCV RBC AUTO: 87.2 FL — SIGNIFICANT CHANGE UP (ref 80–100)
MONOCYTES # BLD AUTO: 0.75 K/UL — SIGNIFICANT CHANGE UP (ref 0–0.9)
MONOCYTES NFR BLD AUTO: 13.9 % — SIGNIFICANT CHANGE UP (ref 2–14)
NEUTROPHILS # BLD AUTO: 3.97 K/UL — SIGNIFICANT CHANGE UP (ref 1.8–7.4)
NEUTROPHILS NFR BLD AUTO: 73.4 % — SIGNIFICANT CHANGE UP (ref 43–77)
PHOSPHATE SERPL-MCNC: 3.3 MG/DL — SIGNIFICANT CHANGE UP (ref 2.4–4.7)
PLATELET # BLD AUTO: 237 K/UL — SIGNIFICANT CHANGE UP (ref 150–400)
POTASSIUM SERPL-MCNC: 4.3 MMOL/L — SIGNIFICANT CHANGE UP (ref 3.5–5.3)
POTASSIUM SERPL-SCNC: 4.3 MMOL/L — SIGNIFICANT CHANGE UP (ref 3.5–5.3)
PROTHROM AB SERPL-ACNC: 24 SEC — HIGH (ref 10.6–13.6)
RBC # BLD: 4.76 M/UL — SIGNIFICANT CHANGE UP (ref 4.2–5.8)
RBC # FLD: 15.9 % — HIGH (ref 10.3–14.5)
SODIUM SERPL-SCNC: 130 MMOL/L — LOW (ref 135–145)
WBC # BLD: 5.41 K/UL — SIGNIFICANT CHANGE UP (ref 3.8–10.5)
WBC # FLD AUTO: 5.41 K/UL — SIGNIFICANT CHANGE UP (ref 3.8–10.5)

## 2021-12-27 PROCEDURE — 99233 SBSQ HOSP IP/OBS HIGH 50: CPT

## 2021-12-27 PROCEDURE — 99223 1ST HOSP IP/OBS HIGH 75: CPT

## 2021-12-27 RX ORDER — TRAMADOL HYDROCHLORIDE 50 MG/1
25 TABLET ORAL EVERY 6 HOURS
Refills: 0 | Status: DISCONTINUED | OUTPATIENT
Start: 2021-12-27 | End: 2021-12-31

## 2021-12-27 RX ORDER — BUMETANIDE 0.25 MG/ML
1 INJECTION INTRAMUSCULAR; INTRAVENOUS
Qty: 20 | Refills: 0 | Status: DISCONTINUED | OUTPATIENT
Start: 2021-12-27 | End: 2021-12-31

## 2021-12-27 RX ORDER — ACETAMINOPHEN 500 MG
650 TABLET ORAL ONCE
Refills: 0 | Status: COMPLETED | OUTPATIENT
Start: 2021-12-27 | End: 2021-12-27

## 2021-12-27 RX ORDER — MILRINONE LACTATE 1 MG/ML
0.12 INJECTION, SOLUTION INTRAVENOUS
Qty: 20 | Refills: 0 | Status: DISCONTINUED | OUTPATIENT
Start: 2021-12-27 | End: 2021-12-31

## 2021-12-27 RX ADMIN — GABAPENTIN 100 MILLIGRAM(S): 400 CAPSULE ORAL at 18:39

## 2021-12-27 RX ADMIN — Medication 650 MILLIGRAM(S): at 05:22

## 2021-12-27 RX ADMIN — ATORVASTATIN CALCIUM 40 MILLIGRAM(S): 80 TABLET, FILM COATED ORAL at 22:18

## 2021-12-27 RX ADMIN — Medication 81 MILLIGRAM(S): at 09:59

## 2021-12-27 RX ADMIN — Medication 975 MILLIGRAM(S): at 18:15

## 2021-12-27 RX ADMIN — APIXABAN 5 MILLIGRAM(S): 2.5 TABLET, FILM COATED ORAL at 05:24

## 2021-12-27 RX ADMIN — Medication 1300 MILLIGRAM(S): at 14:02

## 2021-12-27 RX ADMIN — BUMETANIDE 5 MG/HR: 0.25 INJECTION INTRAMUSCULAR; INTRAVENOUS at 15:38

## 2021-12-27 RX ADMIN — MILRINONE LACTATE 2.92 MICROGRAM(S)/KG/MIN: 1 INJECTION, SOLUTION INTRAVENOUS at 15:38

## 2021-12-27 RX ADMIN — Medication 650 MILLIGRAM(S): at 05:27

## 2021-12-27 RX ADMIN — AMIODARONE HYDROCHLORIDE 200 MILLIGRAM(S): 400 TABLET ORAL at 05:24

## 2021-12-27 RX ADMIN — GABAPENTIN 100 MILLIGRAM(S): 400 CAPSULE ORAL at 05:27

## 2021-12-27 RX ADMIN — GABAPENTIN 100 MILLIGRAM(S): 400 CAPSULE ORAL at 14:02

## 2021-12-27 RX ADMIN — TRAMADOL HYDROCHLORIDE 25 MILLIGRAM(S): 50 TABLET ORAL at 21:18

## 2021-12-27 RX ADMIN — TRAMADOL HYDROCHLORIDE 25 MILLIGRAM(S): 50 TABLET ORAL at 20:34

## 2021-12-27 RX ADMIN — BUMETANIDE 2 MILLIGRAM(S): 0.25 INJECTION INTRAMUSCULAR; INTRAVENOUS at 05:23

## 2021-12-27 RX ADMIN — Medication 1300 MILLIGRAM(S): at 18:39

## 2021-12-27 RX ADMIN — Medication 1300 MILLIGRAM(S): at 05:24

## 2021-12-27 NOTE — PROGRESS NOTE ADULT - SUBJECTIVE AND OBJECTIVE BOX
[place] [facility]                                                               Office: [address] [address]                                                              Telephone: [phone]. Fax:[phone]                                                                             PROGRESS NOTE  Reason for follow up: Decompensated HFrEF  Update: Patient states that he doesn't feel like he is urinating as much today as he did initially with IV Bumex, and he still feels short of breath. Patient denies any chest pain at this time.   Covid Status: Negative 12/23/21    Review of symptoms:   Cardiac:  No chest pain. + dyspnea. No palpitations.  Respiratory: No cough. + dyspnea  Gastrointestinal: No diarrhea. No abdominal pain. No bleeding.     Past medical history: No updates.   	  Vitals:  T(C): 36.6 (12-27-21 @ 04:13), Max: 36.6 (12-26-21 @ 15:54)  HR: 76 (12-27-21 @ 04:13) (68 - 87)  BP: 118/83 (12-27-21 @ 04:13) (110/70 - 118/83)  RR: 18 (12-27-21 @ 04:13) (18 - 18)  SpO2: 94% (12-27-21 @ 04:13) (94% - 99%)  Wt(kg): --  I&O's Summary    26 Dec 2021 07:01  -  27 Dec 2021 07:00  --------------------------------------------------------  IN: 0 mL / OUT: 650 mL / NET: -650 mL        PHYSICAL EXAM:  Appearance: Comfortable. No acute distress  HEENT:  Head and neck: Atraumatic. Normocephalic.  Normal oral mucosa, PERRL, Neck is supple. + JVD, No carotid bruit.   Neurologic: A&Ox 3, no focal deficits. EOMI, Cranial nerves are intact.  Lymphatic: No cervical lymphadenopathy  Cardiovascular: Normal S1 S2, No rubs/gallops. II/VI systolic murmur lsb   Respiratory: Bibasilar rales  Gastrointestinal:  Soft, Non-tender, + BS  Lower Extremities: 2+ b/l LE edema  Psychiatry: Patient is calm. No agitation. Mood & affect appropriate  Skin: No rashes/ecchymoses/cyanosis/ulcers visualized on the face, hands or feet.      CURRENT MEDICATIONS:  aMIOdarone    Tablet 200 milliGRAM(s) Oral daily  buMETAnide Injectable 2 milliGRAM(s) IV Push every 12 hours    gabapentin  atorvastatin  dextrose 40% Gel  dextrose 50% Injectable  dextrose 50% Injectable  dextrose 50% Injectable  glucagon  Injectable  insulin lispro (ADMELOG) corrective regimen sliding scale  insulin lispro (ADMELOG) corrective regimen sliding scale  apixaban  aspirin enteric coated  dextrose 5%.  dextrose 5%.  influenza   Vaccine  sodium bicarbonate      DIAGNOSTIC TESTING:  [ ] Echocardiogram:   < from: VANESSA Echo Doppler (11.22.21 @ 18:34) >  PHYSICIAN INTERPRETATION:  Left Ventricle:  Global LV systolic function was severely decreased. Left ventricular ejection fraction, by visual estimation, is <20%.  In comparison to the previous echocardiogram(s): There are no prior VANESSA studies on this patient for comparison purposes.      Summary:   1. Very limited study due to patient hemodynamic instability.   2. Left ventricular ejection fraction, by visual estimation, is <20% with dilated cardiomyopathy   3. Limited visualization of the left atrial appendage, no evidence of thrombus.    Micah Lua DO Electronically signed on 11/22/2021 at 6:58:45 PM    < end of copied text >    [ ]  Catheterization:  < from: Cardiac Cath Lab - Adult (06.22.16 @ 12:51) >  VENTRICLES: EF estimated was 15 %.  CORONARY VESSELS: The coronary circulation is right dominant.  LM:   --  LM: Angiography showed minor luminal irregularities with no flow  limiting lesions.  LAD:   --  Mid LAD: There was a 50 % stenosis.  CX:   --  Circumflex: Angiography showed minor luminal irregularities with  no flow limiting lesions.  RCA:   --  Proximal RCA: There was a 60 % stenosis.  --  Mid RCA: There was a 85 % stenosis.  COMPLICATIONS: There were no complications.  DIAGNOSTIC RECOMMENDATIONS: The patient should continue with the present  medications.  Prepared and signed by  Tahir Wilks M.D.  Signed 06/22/2016 15:44:49    < end of copied text >    OTHER: 	      LABS:	 	                            13.3   5.41  )-----------( 237      ( 27 Dec 2021 07:29 )             41.5     12-27    130<L>  |  93<L>  |  71.9<H>  ----------------------------<  138<H>  4.3   |  23.0  |  1.75<H>    Ca    8.1<L>      27 Dec 2021 07:29  Phos  3.3     12-27  Mg     2.3     12-27    TPro  6.6  /  Alb  3.8  /  TBili  1.1  /  DBili  x   /  AST  27  /  ALT  17  /  AlkPhos  239<H>  12-26    proBNP: Serum Pro-Brain Natriuretic Peptide: 66969 pg/mL (12-23 @ 21:40)    Lipid Profile:   HgA1c:   TSH: Thyroid Stimulating Hormone, Serum: 6.78 uIU/mL        TELEMETRY: Reviewed  SR, PVCs, NSVT,

## 2021-12-27 NOTE — PROGRESS NOTE ADULT - ASSESSMENT
62 y/o M PMHx of CAD, Nonischemic Cardiomyopathy - CHF (EF in 2016 15-20%) s/p ICD (reports placed about 5 months ago by Dr. Shaikh), nonobstructive CAD, HTN, Afib s/p VANESSA DCCV 11/22/2021 presents to ED with c/o R arm pain, R foot pain and increaseing SOB. ProBNP >80558. CXR with pulm congestion. Pt states taking all medications, but when probed further admits to only taking 1/2 doses Bumex at night, unclear if compliant with other medications, also states has not used cocaine/crack "in years".    Acute Decompensate HFrEF  - EF <20%.   - Patient states no longer urinating as much on IV Bumex.   - Will likely need to add inotropic support.   - Advanced Heart Failure consult pending.   - Continue IV Bumex at this time, can consider switching to Bumex gtt.   - GDMT: losartan and aldactone on hold for DERRICK.   - Unable to add beta blockade due to cocaine use (noted on previous admission).   - Monitor on telemetry.   - Strict i/o and daily weights.   - Keep K > 4, Mg > 2.   - Monitor renal function with ongoing diuresis.      DERRICK on CKD  - BUN/Creatnine improving with diuresis.   - Nephrology consult appreciated   - Hold losartan, spironolactone 2/2 DERRICK    AFib  - VANESSA/DCCV 11/22/21  - Cont Amiodarone  - Cont Eliquis     Substance Use Disorder  - denies cocaine "haven't used in years" despite positive Utox in 11/2021  - Urine Tox-  + THC, +Opiates  - Social work consult.     Assessment and recommendations are final when note is signed by the attending.

## 2021-12-27 NOTE — CONSULT NOTE ADULT - ASSESSMENT
62 y/o with h/o chronic systolic HF ACC/AHA stage C, NICMP LVEF <20% LVIDd 6.96cm, CAD, s/p ICD, HTN, active tobacco use, ?COPD who presented with right arm pain and SOB. He also endorses increasing lower extremity edema.     Of note, the patient was recently admitted for acute decompensated HF in the setting of aflutter w/ RVR. He underwent successful VANESSA/DCCV on 11/22/21. This is his 4th hospitalization in the past year.     Cards: Alison ([facility])    Pertinent Cardiac Studies  11/18/21 EKG Aflutter LAD. PRWP. NS T wave changes  11/20/21 LVEF <20% LVIDd 6.96cm VTI 5cm, RV severely enlarged with severely reduced systolic HF, sev biatrial enlargement, mod-sev MR, moderate TR, RVSP 38mmHg  11/22/21 VANESSA limited study, LVEF <20%, no FADUMO thrombus  2016 [facility] LM minor irreg, mid LAD 50%, LCx minor irreg, RCA prox 60% mid 85% 62 y/o with h/o chronic systolic HF ACC/AHA stage C, NICMP LVEF <20% LVIDd 6.96cm, CAD, s/p ICD, HTN, active tobacco use, ?COPD who presented with right arm pain and SOB. He also endorses increasing lower extremity edema.     Of note, the patient was recently admitted for acute decompensated HF in the setting of aflutter w/ RVR. He underwent successful VANESSA/DCCV on 11/22/21. This is his 4th hospitalization in the past year.     Cards: Alison ([facility])    12/27/21: Pt. denies cp, palpitations, sob at rest. C/O right arm discomfort and discoloration. US negative for DVT. No fevers. Denies N/V/D. Clinically appears to be cool and wet. Hyponatremia likely in setting of hypervolemia. Recommend initiating low dose inotropic support and IV diuresis.     Pertinent Cardiac Studies  11/18/21 EKG Aflutter LAD. PRWP. NS T wave changes  11/20/21 LVEF <20% LVIDd 6.96cm VTI 5cm, RV severely enlarged with severely reduced systolic HF, sev biatrial enlargement, mod-sev MR, moderate TR, RVSP 38mmHg  11/22/21 VANESSA limited study, LVEF <20%, no FADUMO thrombus  2016 [facility] LM minor irreg, mid LAD 50%, LCx minor irreg, RCA prox 60% mid 85%

## 2021-12-27 NOTE — CONSULT NOTE ADULT - PROBLEM SELECTOR RECOMMENDATION 2
In setting of ADHF  Improving, sCr 1.75 today (peak 2.6)  Continue aggressive diuresis as above  Monitor closely with diuresis  Avoid nephrotoxics

## 2021-12-27 NOTE — PROGRESS NOTE ADULT - SUBJECTIVE AND OBJECTIVE BOX
NEPHROLOGY INTERVAL HPI/OVERNIGHT EVENTS:    MEDICATIONS  (STANDING):  aMIOdarone    Tablet 200 milliGRAM(s) Oral daily  aspirin enteric coated 81 milliGRAM(s) Oral daily  atorvastatin 40 milliGRAM(s) Oral at bedtime  dextrose 40% Gel 15 Gram(s) Oral once  dextrose 5%. 1000 milliLiter(s) (50 mL/Hr) IV Continuous <Continuous>  dextrose 5%. 1000 milliLiter(s) (100 mL/Hr) IV Continuous <Continuous>  dextrose 50% Injectable 25 Gram(s) IV Push once  dextrose 50% Injectable 12.5 Gram(s) IV Push once  dextrose 50% Injectable 25 Gram(s) IV Push once  gabapentin 100 milliGRAM(s) Oral three times a day  glucagon  Injectable 1 milliGRAM(s) IntraMuscular once  influenza   Vaccine 0.5 milliLiter(s) IntraMuscular once  insulin lispro (ADMELOG) corrective regimen sliding scale   SubCutaneous three times a day before meals  insulin lispro (ADMELOG) corrective regimen sliding scale   SubCutaneous at bedtime  sodium bicarbonate 1300 milliGRAM(s) Oral three times a day    MEDICATIONS  (PRN):  aluminum hydroxide/magnesium hydroxide/simethicone Suspension 30 milliLiter(s) Oral every 4 hours PRN Dyspepsia  melatonin 3 milliGRAM(s) Oral at bedtime PRN Insomnia  ondansetron Injectable 4 milliGRAM(s) IV Push every 8 hours PRN Nausea and/or Vomiting      Allergies    No Known Allergies    Intolerances    pork (Other)      Vital Signs Last 24 Hrs  T(C): 36.6 (27 Dec 2021 04:13), Max: 36.6 (26 Dec 2021 15:54)  T(F): 97.8 (27 Dec 2021 04:13), Max: 97.9 (26 Dec 2021 15:54)  HR: 77 (27 Dec 2021 10:04) (68 - 77)  BP: 125/88 (27 Dec 2021 10:04) (110/70 - 125/88)  BP(mean): --  RR: 18 (27 Dec 2021 10:04) (18 - 18)  SpO2: 95% (27 Dec 2021 10:04) (94% - 95%)  Daily     Daily     PHYSICAL EXAM:    GENERAL: comfortable  supine in bed  HEAD:  wnl  EYES:   ENMT:   NECK: veins  same  NERVOUS SYSTEM: alert, oriented   CHEST/LUNG: no 02, decreased bs bases  HEART: Same  ABDOMEN: Distended , ascites   EXTREMITIES:  Muscle wasting  same  LYMPH:   SKIN: no rash  : Neg    LABS:                        13.3   5.41  )-----------( 237      ( 27 Dec 2021 07:29 )             41.5     12-27    130<L>  |  93<L>  |  71.9<H>  ----------------------------<  138<H>  4.3   |  23.0  |  1.75<H>    Ca    8.1<L>      27 Dec 2021 07:29  Phos  3.3     12-27  Mg     2.3     12-27    TPro  6.6  /  Alb  3.8  /  TBili  1.1  /  DBili  x   /  AST  27  /  ALT  17  /  AlkPhos  239<H>  12-26    PT/INR - ( 27 Dec 2021 07:29 )   PT: 24.0 sec;   INR: 2.15 ratio         PTT - ( 27 Dec 2021 07:29 )  PTT:36.0 sec    Magnesium, Serum: 2.3 mg/dL (12-27 @ 07:29)  Phosphorus Level, Serum: 3.3 mg/dL (12-27 @ 07:29)          RADIOLOGY & ADDITIONAL TESTS:

## 2021-12-27 NOTE — CONSULT NOTE ADULT - PROBLEM SELECTOR RECOMMENDATION 4
s/p VANESSA cardioversion 11/22/21  Remains in SR  AC: Eliquis s/p VANESSA cardioversion 11/22/21  Remains in SR  On Amiodarone 200mg PO daily  AC: Eliquis

## 2021-12-27 NOTE — CONSULT NOTE ADULT - PROBLEM SELECTOR RECOMMENDATION 9
ACC/AHA stage C, NICMP (LVEF <20%, LVIDd 6.96cm)  Clinically hypervolemic, cool extremities  Recommend initiating inotropic support w/ Milrinone 0.125mcg/kg/min  Diuretics: Start Bumex infusion at 1mg/hr. Please check BMP q12 hrs while on gtt. Maintain K+ >4.0 and Mg >2.0  Device: ICD  Will reintroduce GDMT as tolerated after diuresis.  Not candidate for advanced therapies due to smoking/drug use history and non-compliance. ACC/AHA stage C, NICMP (LVEF <20%, LVIDd 6.96cm)  Clinically hypervolemic, cool extremities  Recommend initiating inotropic support w/ Milrinone 0.125mcg/kg/min  Diuretics: Start Bumex infusion at 1mg/hr. Please check BMP q12 hrs while on gtt. Maintain K+ >4.0 and Mg >2.0.  Monitor markers of perfusion daily including lactate, LFTs  Device: ICD  Will reintroduce GDMT as tolerated after diuresis.  Not candidate for advanced therapies due to smoking/drug use history and non-compliance.

## 2021-12-27 NOTE — CONSULT NOTE ADULT - ATTENDING COMMENTS
61M history as listed significant for chronic severely dilated cardiomyopathy, HFrEF <20% with prior cocaine abuse, recently admitted in 11/2021 in setting of inappropriate ICD shocks due to Aflutter RVR s/p very limited VANESSA/CV with low output HF, noncompliant with office follow up and meds (was only taking Bumex once daily at times), p/w L-arm/leg pain, found hypoxic 80s, grossly fluid overloaded on exam, hyponatremia  -start IV Bumex 2mg BID  -may need inotrope assisted diuresis, hold beta-blocker for now  -continue Amio/Eliquis for recent pAflutter s/p CV  -hold ARB/MRA in setting of DERRICK  -if worsening hyponatremia consider tolvaptan use      Micah Mullins DO, [facility]  Faculty Non-Invasive Cardiologist  [phone]
Patient known to use from prior admission.   Presents again with decompensated HF and low output state in setting of medication and dietary noncompliance. He states he is unhappy with his current living situation. Of note he did not showed up for his post hospitalization follow up visit.  Clinically is hypervolemic with ascites, pleural effusions and cool extremities.   Needs aggressive diuresis with inotrope assistance.   Has clear evidence of advanced stage D HF. He is not a candidate due to recent cocaine use and non compliance.

## 2021-12-27 NOTE — PROGRESS NOTE ADULT - ASSESSMENT
61 year old male with history of CAD, Severely Dilated CM/HFrEF s/p ICD, Substance abuse. Atrial Flutter on Eliquis, ETOH use who presented to the ED with complaints of right arm and foot pain with increased shortness of breath due to medication noncompliace and admitted with decompensated heart failure.     #Acute Hypoxic Respiratory Failure secondary to Severely Dilated Cardiomyopathy/HFrEF  - remains hypervolemic and hypoxic today requiring O2  - start bumex gtt and milrinone gtt per heart failure team  - TTE 11/20/2021 moderate TR and EF <20%  - avoid ACE/ARB due to DERRICK  - telemonitoring   - strict I/os, daily weights  - will discuss possible ischemic evaluation once optimized  - cardio/HF recs appreciated    #Hypervolemic Hyponatremia in the setting of decompensated heart failure  -sodium stable at 130  -urine lytes reviewed  -continue diuretics as above  -free water restriction    -monitor I/os  -renal consult appreciated    #DERRICK on CKD stage 3  -likely due to cardiorenal syndrome and use of ARB  -baseline creatinine 1.5-1.6  -creatinine improved to 1.75 today  -UA bland, urine sodium < 30  -continue bumex and monitor renal function closely  -continue to hold losartan and aldactone  -continue sodium bicarb TID; serum sodium 23  -avoid nephrotoxic agents and renally dose medications   -strict I/os, daily weights  -renal recommendations appreciated    #Atrial flutter  - continue amiodarone  - INR 2.12; hold eliquis tonight for paracentesis    #CAD  -asymptomatic  -continue aspirin and statin      #DM  -HbA1c 6.3  -ISS  -ADA diet    #Neuropathy  -continue gabapentin    #Substance use  - hx of cocaine use- patient stating has not used for long time   - UDS positive for THC and opoids  - cessation discussed     #Cirrhosis likely ETOH related   -patient reports cutting down ETOH intake  -abdominal sono with cirrhotic morphology and moderate ascites  -monitor LFTs  -continue diuretics  -outpatient screening EGD for varices  -will need hepatology referral as outpatient  -alcohol cessation  -tentative plans for paracentesis tomorrow if INR < 2 per IR    #Elevated TSH  -normal free T3/T4  -no indication for synthroid    DVT ppx - Eliquis held tonight for paracentesis    Dispo - Started on bumex and milrinone gtts. PT home with assist vs CHAPINCITO; f/u final recs.    Plan discussed with patient, heart failure and cardio teams, RN

## 2021-12-27 NOTE — CONSULT NOTE ADULT - SUBJECTIVE AND OBJECTIVE BOX
[facility] Advanced Heart Failure  Office: [place] [address]  Telephone: [phone]/Fax: [phone]    HPI:  61 year old male with pmh of cad, chf (combined) w/ reduced ef s/p ICD, substance abuse, coming to hospital with complaints of right arm and foot pain with increased shortness of breath. per patient states started 2 days prior and has been taking most of his medications however does not remember all of the names states has not changed since last discharge. states sob worsens with activity improves with rest. denies nausea vomiting diarrea or chest pain at this time. of note patient never followed up with cardiology since last discharge. right leg and arm pain described as electric and intermittent in nature no correlation to activity.  (24 Dec 2021 16:13)    PAST MEDICAL & SURGICAL HISTORY:  Heart failure, systolic    CAD (coronary artery disease)    Hypertension    Nonischemic cardiomyopathy    No significant past surgical history      REVIEW OF SYSTEMS:  14 point ROS negative in detail apart from as documented in HPI.    MEDICATIONS  (STANDING):  aMIOdarone    Tablet 200 milliGRAM(s) Oral daily  aspirin enteric coated 81 milliGRAM(s) Oral daily  atorvastatin 40 milliGRAM(s) Oral at bedtime  buMETAnide Injectable 2 milliGRAM(s) IV Push every 12 hours  dextrose 40% Gel 15 Gram(s) Oral once  dextrose 5%. 1000 milliLiter(s) (50 mL/Hr) IV Continuous <Continuous>  dextrose 5%. 1000 milliLiter(s) (100 mL/Hr) IV Continuous <Continuous>  dextrose 50% Injectable 25 Gram(s) IV Push once  dextrose 50% Injectable 12.5 Gram(s) IV Push once  dextrose 50% Injectable 25 Gram(s) IV Push once  gabapentin 100 milliGRAM(s) Oral three times a day  glucagon  Injectable 1 milliGRAM(s) IntraMuscular once  influenza   Vaccine 0.5 milliLiter(s) IntraMuscular once  insulin lispro (ADMELOG) corrective regimen sliding scale   SubCutaneous three times a day before meals  insulin lispro (ADMELOG) corrective regimen sliding scale   SubCutaneous at bedtime  sodium bicarbonate 1300 milliGRAM(s) Oral three times a day    MEDICATIONS  (PRN):  aluminum hydroxide/magnesium hydroxide/simethicone Suspension 30 milliLiter(s) Oral every 4 hours PRN Dyspepsia  melatonin 3 milliGRAM(s) Oral at bedtime PRN Insomnia  ondansetron Injectable 4 milliGRAM(s) IV Push every 8 hours PRN Nausea and/or Vomiting    Allergies: No Known Allergies    Intolerances: pork (Other)    Social History:  Lives at apartment with 3 roommates, reports exposure frequent drug activity  Denies tobacco use  +marijuana use  Denies etoh use (24 Dec 2021 16:13)    FAMILY HISTORY:  FH: lung cancer    Vital Signs Last 24 Hrs  T(C): 36.6, Max: 36.6 (12-26 @ 15:54)  HR: 77 (68 - 87)  BP: 125/88 (110/70 - 125/88)  RR: 18 (18 - 18)  SpO2: 95% (94% - 99%)    I&O's Summary Last 24 Hrs    IN: 0 mL / OUT: 650 mL / NET: -650 mL    Tele:    Physical Exam:  General: No distress. Comfortable.  HEENT: EOM intact.  Neck: Neck supple. JVP not elevated. No masses  Chest: Clear to auscultation bilaterally  CV: Normal S1 and S2. No murmurs, rub, or gallops. Radial pulses normal.  Abdomen: Soft, non-distended, non-tender  Skin: No rashes or skin breakdown  Neurology: Alert and oriented times three. Sensation intact  Psych: Affect normal    Labs:    ( 12-27-21 @ 07:29 )               13.3   5.41  )--------( 237                  41.5     ( 12-27-21 @ 07:29 )     130  |  93  |  71.9  ---------------------<  138  4.3  |  23.0  |  1.75    Ca 8.1  Phos 3.3  Mg 2.3    ( 12-26-21 @ 09:22 )  TPro  6.6  /  Alb  3.8  /  TBili  1.1  /  DBili  x   /  AST  27  /  ALT  17  /  AlkPhos  239  ( 12-25-21 @ 07:33 )  TPro  6.7  /  Alb  3.9  /  TBili  2.3  /  DBili  x   /  AST  22  /  ALT  15  /  AlkPhos  214    PTT/PT/INR - ( 12-27-21 @ 07:29 )  PTT: 36.0 sec / PT: 24.0 sec / INR: 2.15 ratio      Serum Pro-Brain Natriuretic Peptide: [id number] (12-23-21 @ 21:40)     [facility] Advanced Heart Failure  Office: [place] [address]  Telephone: [phone]/Fax: [phone]    HPI:  60 y/o AA male with systolic HF s/p ICD, CAD, Aflutter s/p cardioversion, HTN, active tobacco use, ?COPD and substance abuse history who presented with c/o right upper arm pain, LE edema and shortness of breath. He reports that he has been taking his Bumex at home although he finds that his living situation makes it tough to always be compliant.      PAST MEDICAL & SURGICAL HISTORY:  Heart failure, systolic    CAD (coronary artery disease)    Hypertension    Nonischemic cardiomyopathy    No significant past surgical history      REVIEW OF SYSTEMS:  14 point ROS negative in detail apart from as documented in HPI.    MEDICATIONS  (STANDING):  aMIOdarone    Tablet 200 milliGRAM(s) Oral daily  aspirin enteric coated 81 milliGRAM(s) Oral daily  atorvastatin 40 milliGRAM(s) Oral at bedtime  buMETAnide Injectable 2 milliGRAM(s) IV Push every 12 hours  dextrose 40% Gel 15 Gram(s) Oral once  dextrose 5%. 1000 milliLiter(s) (50 mL/Hr) IV Continuous <Continuous>  dextrose 5%. 1000 milliLiter(s) (100 mL/Hr) IV Continuous <Continuous>  dextrose 50% Injectable 25 Gram(s) IV Push once  dextrose 50% Injectable 12.5 Gram(s) IV Push once  dextrose 50% Injectable 25 Gram(s) IV Push once  gabapentin 100 milliGRAM(s) Oral three times a day  glucagon  Injectable 1 milliGRAM(s) IntraMuscular once  influenza   Vaccine 0.5 milliLiter(s) IntraMuscular once  insulin lispro (ADMELOG) corrective regimen sliding scale   SubCutaneous three times a day before meals  insulin lispro (ADMELOG) corrective regimen sliding scale   SubCutaneous at bedtime  sodium bicarbonate 1300 milliGRAM(s) Oral three times a day    MEDICATIONS  (PRN):  aluminum hydroxide/magnesium hydroxide/simethicone Suspension 30 milliLiter(s) Oral every 4 hours PRN Dyspepsia  melatonin 3 milliGRAM(s) Oral at bedtime PRN Insomnia  ondansetron Injectable 4 milliGRAM(s) IV Push every 8 hours PRN Nausea and/or Vomiting    Allergies: No Known Allergies    Intolerances: pork (Other)    Social History:  Lives at apartment with 3 roommates, reports exposure frequent drug activity  Denies tobacco use  +marijuana use  Denies etoh use (24 Dec 2021 16:13)    FAMILY HISTORY:  FH: lung cancer    Vital Signs Last 24 Hrs  T(C): 36.6, Max: 36.6 (12-26 @ 15:54)  HR: 77 (68 - 87)  BP: 125/88 (110/70 - 125/88)  RR: 18 (18 - 18)  SpO2: 95% (94% - 99%)    I&O's Summary Last 24 Hrs    IN: 0 mL / OUT: 650 mL / NET: -650 mL    Tele: Sinus rhythm, APC    Physical Exam:  General: No distress. Comfortable.  HEENT: EOMs intact, sclera non-icteric   Neck: JVP significantly elevated  Chest: expiratory wheezes appreciated posteriorly   CV: RRR. Normal S1 and S2. No murmurs, rub, or gallops. Cool peripherally.   PV: 2+ pitting bilaterally LE edema  Abdomen: distended  Skin: cool, dry, intact  Neurology: Alert and oriented times three. Sensation intact  Psych: Affect normal    Labs:    ( 12-27-21 @ 07:29 )               13.3   5.41  )--------( 237                  41.5     ( 12-27-21 @ 07:29 )     130  |  93  |  71.9  ---------------------<  138  4.3  |  23.0  |  1.75    Ca 8.1  Phos 3.3  Mg 2.3    ( 12-26-21 @ 09:22 )  TPro  6.6  /  Alb  3.8  /  TBili  1.1  /  DBili  x   /  AST  27  /  ALT  17  /  AlkPhos  239  ( 12-25-21 @ 07:33 )  TPro  6.7  /  Alb  3.9  /  TBili  2.3  /  DBili  x   /  AST  22  /  ALT  15  /  AlkPhos  214    PTT/PT/INR - ( 12-27-21 @ 07:29 )  PTT: 36.0 sec / PT: 24.0 sec / INR: 2.15 ratio      Serum Pro-Brain Natriuretic Peptide: 34670 (12-23-21 @ 21:40)    US Abdomen Complete (US Abdomen Complete .) (12.25.21 @ 11:36)   IMPRESSION: Nodular liver suggests cirrhosis  Moderate amount of ascites    US Duplex Venous Lower Ext Complete, Bilateral (12.23.21 @ 23:08)   IMPRESSION:  No evidence of deep venous thrombosis in either lower extremity.    US Duplex Venous Upper Ext Ltd, Right (12.23.21 @ 23:06)   COMPARISON: Right upper extremity venous duplex 12/1/2021    FINDINGS:  The right internal jugular, subclavian, axillary and brachial veins are   patent and compressible where applicable.  The basilic and cephalic veins   (superficial veins) are patent and without thrombus.    Doppler examination shows normal spontaneous and phasic flow.    IMPRESSION:  No evidence of right upper extremity deep venous thrombosis.    TTE Echo Complete w/o Contrast w/ Doppler (11.20.21 @ 08:33)   Summary:   1. Left ventricular ejection fraction, by visual estimation, is <20%.   2. Technically good study.   3. Severely enlarged left atrium.   4. Severely enlarged right atrium.   5. Severely enlarged right ventricle.   6. Severely reduced RV systolic function.   7. Moderate to severe mitral valve regurgitation.   8. Thickening of the anterior mitral valve leaflet.   9. Moderate tricuspid regurgitation.  10. Estimated pulmonary artery systolic pressure is 37.8 mmHg assuming a right atrial pressure of 8 mmHg, which is consistent with borderline pulmonary hypertension.    < end of copied text >

## 2021-12-27 NOTE — PROGRESS NOTE ADULT - SUBJECTIVE AND OBJECTIVE BOX
CHIEF COMPLAINT/INTERVAL HISTORY:    Patient is a 61y old  Male who presents with a chief complaint of CHF (27 Dec 2021 13:31)    SUBJECTIVE & OBJECTIVE: Pt seen and examined at bedside. No overnight events. Patient complaining of right arm pain, no obvious hematoma; full ROM. Remains hypervolemic; started on bumex/milrinone gtt.     ROS: No chest pain, palpitations, SOB, light headedness, dizziness, headache, nausea/vomiting, fevers/chills, abdominal pain, dysuria or increased urinary frequency.    ICU Vital Signs Last 24 Hrs  T(C): 36.6 (27 Dec 2021 04:13), Max: 36.6 (26 Dec 2021 15:54)  T(F): 97.8 (27 Dec 2021 04:13), Max: 97.9 (26 Dec 2021 15:54)  HR: 77 (27 Dec 2021 10:04) (68 - 77)  BP: 125/88 (27 Dec 2021 10:04) (110/70 - 125/88)  RR: 18 (27 Dec 2021 10:04) (18 - 18)  SpO2: 95% (27 Dec 2021 10:04) (94% - 95%)    MEDICATIONS  (STANDING):  aMIOdarone    Tablet 200 milliGRAM(s) Oral daily  aspirin enteric coated 81 milliGRAM(s) Oral daily  atorvastatin 40 milliGRAM(s) Oral at bedtime  buMETAnide Infusion 1 mG/Hr (5 mL/Hr) IV Continuous <Continuous>  dextrose 40% Gel 15 Gram(s) Oral once  dextrose 5%. 1000 milliLiter(s) (50 mL/Hr) IV Continuous <Continuous>  dextrose 5%. 1000 milliLiter(s) (100 mL/Hr) IV Continuous <Continuous>  dextrose 50% Injectable 25 Gram(s) IV Push once  dextrose 50% Injectable 25 Gram(s) IV Push once  dextrose 50% Injectable 12.5 Gram(s) IV Push once  gabapentin 100 milliGRAM(s) Oral three times a day  glucagon  Injectable 1 milliGRAM(s) IntraMuscular once  influenza   Vaccine 0.5 milliLiter(s) IntraMuscular once  insulin lispro (ADMELOG) corrective regimen sliding scale   SubCutaneous three times a day before meals  insulin lispro (ADMELOG) corrective regimen sliding scale   SubCutaneous at bedtime  milrinone Infusion 0.125 MICROgram(s)/kG/Min (2.92 mL/Hr) IV Continuous <Continuous>  sodium bicarbonate 1300 milliGRAM(s) Oral three times a day    MEDICATIONS  (PRN):  aluminum hydroxide/magnesium hydroxide/simethicone Suspension 30 milliLiter(s) Oral every 4 hours PRN Dyspepsia  melatonin 3 milliGRAM(s) Oral at bedtime PRN Insomnia  ondansetron Injectable 4 milliGRAM(s) IV Push every 8 hours PRN Nausea and/or Vomiting  traMADol 25 milliGRAM(s) Oral every 6 hours PRN Severe Pain (7 - 10)      LABS:                        13.3   5.41  )-----------( 237      ( 27 Dec 2021 07:29 )             41.5     12-27    130<L>  |  93<L>  |  71.9<H>  ----------------------------<  138<H>  4.3   |  23.0  |  1.75<H>    Ca    8.1<L>      27 Dec 2021 07:29  Phos  3.3     12-27  Mg     2.3     12-27    TPro  6.6  /  Alb  3.8  /  TBili  1.1  /  DBili  x   /  AST  27  /  ALT  17  /  AlkPhos  239<H>  12-26    PT/INR - ( 27 Dec 2021 07:29 )   PT: 24.0 sec;   INR: 2.15 ratio         PTT - ( 27 Dec 2021 07:29 )  PTT:36.0 sec      CAPILLARY BLOOD GLUCOSE      POCT Blood Glucose.: 120 mg/dL (27 Dec 2021 11:50)  POCT Blood Glucose.: 99 mg/dL (27 Dec 2021 08:27)  POCT Blood Glucose.: 171 mg/dL (26 Dec 2021 21:27)  POCT Blood Glucose.: 103 mg/dL (26 Dec 2021 16:33)       PHYSICAL EXAM:    GENERAL: elderly male, sitting in bed, NAD  HEAD:  Atraumatic, Normocephalic  EYES: EOMI, PERRLA, conjunctiva and sclera clear  ENMT: Moist mucous membranes  NECK: Supple   NERVOUS SYSTEM:  Alert & Oriented X3   CHEST/LUNG: bilateral air entry  HEART: Regular rate and rhythm; +S1/S2  ABDOMEN: Soft, Nontender, distended, + ascites; Bowel sounds present  EXTREMITIES:  + pedal edema

## 2021-12-28 LAB
ALBUMIN SERPL ELPH-MCNC: 3.8 G/DL — SIGNIFICANT CHANGE UP (ref 3.3–5.2)
ALP SERPL-CCNC: 237 U/L — HIGH (ref 40–120)
ALT FLD-CCNC: 19 U/L — SIGNIFICANT CHANGE UP
ANION GAP SERPL CALC-SCNC: 16 MMOL/L — SIGNIFICANT CHANGE UP (ref 5–17)
AST SERPL-CCNC: 32 U/L — SIGNIFICANT CHANGE UP
BASOPHILS # BLD AUTO: 0.03 K/UL — SIGNIFICANT CHANGE UP (ref 0–0.2)
BASOPHILS NFR BLD AUTO: 0.5 % — SIGNIFICANT CHANGE UP (ref 0–2)
BILIRUB SERPL-MCNC: 1 MG/DL — SIGNIFICANT CHANGE UP (ref 0.4–2)
BUN SERPL-MCNC: 57.8 MG/DL — HIGH (ref 8–20)
CALCIUM SERPL-MCNC: 8.1 MG/DL — LOW (ref 8.6–10.2)
CHLORIDE SERPL-SCNC: 95 MMOL/L — LOW (ref 98–107)
CO2 SERPL-SCNC: 27 MMOL/L — SIGNIFICANT CHANGE UP (ref 22–29)
CREAT SERPL-MCNC: 1.41 MG/DL — HIGH (ref 0.5–1.3)
EOSINOPHIL # BLD AUTO: 0.03 K/UL — SIGNIFICANT CHANGE UP (ref 0–0.5)
EOSINOPHIL NFR BLD AUTO: 0.5 % — SIGNIFICANT CHANGE UP (ref 0–6)
GLUCOSE BLDC GLUCOMTR-MCNC: 100 MG/DL — HIGH (ref 70–99)
GLUCOSE BLDC GLUCOMTR-MCNC: 112 MG/DL — HIGH (ref 70–99)
GLUCOSE BLDC GLUCOMTR-MCNC: 117 MG/DL — HIGH (ref 70–99)
GLUCOSE BLDC GLUCOMTR-MCNC: 120 MG/DL — HIGH (ref 70–99)
GLUCOSE SERPL-MCNC: 97 MG/DL — SIGNIFICANT CHANGE UP (ref 70–99)
HCT VFR BLD CALC: 43.6 % — SIGNIFICANT CHANGE UP (ref 39–50)
HGB BLD-MCNC: 14 G/DL — SIGNIFICANT CHANGE UP (ref 13–17)
IMM GRANULOCYTES NFR BLD AUTO: 0.5 % — SIGNIFICANT CHANGE UP (ref 0–1.5)
INR BLD: 1.47 RATIO — HIGH (ref 0.88–1.16)
LDH SERPL L TO P-CCNC: 262 U/L — HIGH (ref 98–192)
LYMPHOCYTES # BLD AUTO: 0.61 K/UL — LOW (ref 1–3.3)
LYMPHOCYTES # BLD AUTO: 10.6 % — LOW (ref 13–44)
MAGNESIUM SERPL-MCNC: 2.1 MG/DL — SIGNIFICANT CHANGE UP (ref 1.6–2.6)
MCHC RBC-ENTMCNC: 28.3 PG — SIGNIFICANT CHANGE UP (ref 27–34)
MCHC RBC-ENTMCNC: 32.1 GM/DL — SIGNIFICANT CHANGE UP (ref 32–36)
MCV RBC AUTO: 88.1 FL — SIGNIFICANT CHANGE UP (ref 80–100)
MONOCYTES # BLD AUTO: 0.76 K/UL — SIGNIFICANT CHANGE UP (ref 0–0.9)
MONOCYTES NFR BLD AUTO: 13.2 % — SIGNIFICANT CHANGE UP (ref 2–14)
NEUTROPHILS # BLD AUTO: 4.31 K/UL — SIGNIFICANT CHANGE UP (ref 1.8–7.4)
NEUTROPHILS NFR BLD AUTO: 74.7 % — SIGNIFICANT CHANGE UP (ref 43–77)
NT-PROBNP SERPL-SCNC: HIGH PG/ML (ref 0–300)
PHOSPHATE SERPL-MCNC: 2.6 MG/DL — SIGNIFICANT CHANGE UP (ref 2.4–4.7)
PLATELET # BLD AUTO: 188 K/UL — SIGNIFICANT CHANGE UP (ref 150–400)
POTASSIUM SERPL-MCNC: 4.3 MMOL/L — SIGNIFICANT CHANGE UP (ref 3.5–5.3)
POTASSIUM SERPL-SCNC: 4.3 MMOL/L — SIGNIFICANT CHANGE UP (ref 3.5–5.3)
PROT SERPL-MCNC: 6.7 G/DL — SIGNIFICANT CHANGE UP (ref 6.6–8.7)
PROTHROM AB SERPL-ACNC: 16.7 SEC — HIGH (ref 10.6–13.6)
RBC # BLD: 4.95 M/UL — SIGNIFICANT CHANGE UP (ref 4.2–5.8)
RBC # FLD: 15.7 % — HIGH (ref 10.3–14.5)
SODIUM SERPL-SCNC: 138 MMOL/L — SIGNIFICANT CHANGE UP (ref 135–145)
WBC # BLD: 5.77 K/UL — SIGNIFICANT CHANGE UP (ref 3.8–10.5)
WBC # FLD AUTO: 5.77 K/UL — SIGNIFICANT CHANGE UP (ref 3.8–10.5)

## 2021-12-28 PROCEDURE — 99233 SBSQ HOSP IP/OBS HIGH 50: CPT

## 2021-12-28 RX ORDER — APIXABAN 2.5 MG/1
5 TABLET, FILM COATED ORAL
Refills: 0 | Status: DISCONTINUED | OUTPATIENT
Start: 2021-12-28 | End: 2021-12-31

## 2021-12-28 RX ADMIN — BUMETANIDE 5 MG/HR: 0.25 INJECTION INTRAMUSCULAR; INTRAVENOUS at 09:09

## 2021-12-28 RX ADMIN — GABAPENTIN 100 MILLIGRAM(S): 400 CAPSULE ORAL at 05:38

## 2021-12-28 RX ADMIN — Medication 1300 MILLIGRAM(S): at 05:38

## 2021-12-28 RX ADMIN — GABAPENTIN 100 MILLIGRAM(S): 400 CAPSULE ORAL at 23:32

## 2021-12-28 RX ADMIN — MILRINONE LACTATE 2.92 MICROGRAM(S)/KG/MIN: 1 INJECTION, SOLUTION INTRAVENOUS at 20:07

## 2021-12-28 RX ADMIN — Medication 81 MILLIGRAM(S): at 13:31

## 2021-12-28 RX ADMIN — GABAPENTIN 100 MILLIGRAM(S): 400 CAPSULE ORAL at 13:31

## 2021-12-28 RX ADMIN — ATORVASTATIN CALCIUM 40 MILLIGRAM(S): 80 TABLET, FILM COATED ORAL at 23:32

## 2021-12-28 RX ADMIN — APIXABAN 5 MILLIGRAM(S): 2.5 TABLET, FILM COATED ORAL at 17:14

## 2021-12-28 RX ADMIN — Medication 1300 MILLIGRAM(S): at 13:31

## 2021-12-28 RX ADMIN — AMIODARONE HYDROCHLORIDE 200 MILLIGRAM(S): 400 TABLET ORAL at 05:39

## 2021-12-28 NOTE — CHART NOTE - NSCHARTNOTEFT_GEN_A_CORE
IR paracentesis was to be done bedside. Pt states that he thinks his abdomen has been improving due to medications he's been on so he doesn't think he needs a paracentesis now. His abdomen appears only mildly distended.  Will hold off on paracentesis for now. Discussed with Dr. Chacon.

## 2021-12-28 NOTE — CHART NOTE - NSCHARTNOTEFT_GEN_A_CORE
Vascular & Interventional Radiology Pre-Procedure Note    Procedure Name: ___paracentesis____    HPI: 61y Male with __ascites_____    61 year old male with history of CAD, Severely Dilated CM/HFrEF s/p ICD, Substance abuse. Atrial Flutter on Eliquis, ETOH use, cirrhosis and ascites who presented to the ED with complaints of right arm and foot pain with increased shortness of breath due to medication noncompliace and admitted with decompensated heart failure.  Now for paracentesis    PMH/PSH  CAD (coronary artery disease)  Hypertension  Nonischemic cardiomyopathy  Acute on chronic systolic heart failure, NYHA class 3  DERRICK (acute kidney injury)  Ascites  H/O atrial flutter      Allergies: No Known Allergies  pork (Other)      Medications (Abx/Cardiac/Anticoagulation/Blood Products)  aluminum hydroxide/magnesium hydroxide/simethicone Suspension 30 milliLiter(s) Oral every 4 hours PRN  aMIOdarone    Tablet 200 milliGRAM(s) Oral daily  aspirin enteric coated 81 milliGRAM(s) Oral daily  atorvastatin 40 milliGRAM(s) Oral at bedtime  buMETAnide Infusion 1 mG/Hr IV Continuous <Continuous>  dextrose 40% Gel 15 Gram(s) Oral once  dextrose 5%. 1000 milliLiter(s) IV Continuous <Continuous>  dextrose 5%. 1000 milliLiter(s) IV Continuous <Continuous>  dextrose 50% Injectable 25 Gram(s) IV Push once  dextrose 50% Injectable 12.5 Gram(s) IV Push once  dextrose 50% Injectable 25 Gram(s) IV Push once  gabapentin 100 milliGRAM(s) Oral three times a day  glucagon  Injectable 1 milliGRAM(s) IntraMuscular once  influenza   Vaccine 0.5 milliLiter(s) IntraMuscular once  insulin lispro (ADMELOG) corrective regimen sliding scale   SubCutaneous three times a day before meals  insulin lispro (ADMELOG) corrective regimen sliding scale   SubCutaneous at bedtime  melatonin 3 milliGRAM(s) Oral at bedtime PRN  milrinone Infusion 0.125 MICROgram(s)/kG/Min IV Continuous <Continuous>  ondansetron Injectable 4 milliGRAM(s) IV Push every 8 hours PRN  sodium bicarbonate 1300 milliGRAM(s) Oral three times a day  traMADol 25 milliGRAM(s) Oral every 6 hours PRN  Was on Eliquis (last dose 12/27 5AM)    Data:  182.9  77.8    T(C): 36.5  HR: 75  BP: 114/74  RR: 18  SpO2: 98%        -WBC 5.77 / HgB 14.0 / Hct 43.6 / Plt 188  -Na 138 / Cl 95 / BUN 57.8 / Glucose 97  -K 4.3 / CO2 27.0 / Cr 1.41  -ALT 19 / Alk Phos 237 / T.Bili 1.0  -INR1.47      Imaging: ___ascites____    Plan:   -61y Male presents for ___paracentesis____  -Risks/Benefits/alternatives explained with the patient and witnessed informed consent obtained. Vascular & Interventional Radiology Pre-Procedure Note    Procedure Name: ___paracentesis____    HPI: 61y Male with __ascites_____    61 year old male with history of CAD, Severely Dilated CM/HFrEF s/p ICD, Substance abuse. Atrial Flutter on Eliquis, ETOH use, cirrhosis and ascites who presented to the ED with complaints of right arm and foot pain with increased shortness of breath due to medication noncompliace and admitted with decompensated heart failure.  Now for paracentesis    PMH/PSH  CAD (coronary artery disease)  Hypertension  Nonischemic cardiomyopathy  Acute on chronic systolic heart failure, NYHA class 3  DERRICK (acute kidney injury)  Ascites  H/O atrial flutter      Allergies: No Known Allergies  pork (Other)      Medications (Abx/Cardiac/Anticoagulation/Blood Products)  aluminum hydroxide/magnesium hydroxide/simethicone Suspension 30 milliLiter(s) Oral every 4 hours PRN  aMIOdarone    Tablet 200 milliGRAM(s) Oral daily  aspirin enteric coated 81 milliGRAM(s) Oral daily  atorvastatin 40 milliGRAM(s) Oral at bedtime  buMETAnide Infusion 1 mG/Hr IV Continuous <Continuous>  dextrose 40% Gel 15 Gram(s) Oral once  dextrose 5%. 1000 milliLiter(s) IV Continuous <Continuous>  dextrose 5%. 1000 milliLiter(s) IV Continuous <Continuous>  dextrose 50% Injectable 25 Gram(s) IV Push once  dextrose 50% Injectable 12.5 Gram(s) IV Push once  dextrose 50% Injectable 25 Gram(s) IV Push once  gabapentin 100 milliGRAM(s) Oral three times a day  glucagon  Injectable 1 milliGRAM(s) IntraMuscular once  influenza   Vaccine 0.5 milliLiter(s) IntraMuscular once  insulin lispro (ADMELOG) corrective regimen sliding scale   SubCutaneous three times a day before meals  insulin lispro (ADMELOG) corrective regimen sliding scale   SubCutaneous at bedtime  melatonin 3 milliGRAM(s) Oral at bedtime PRN  milrinone Infusion 0.125 MICROgram(s)/kG/Min IV Continuous <Continuous>  ondansetron Injectable 4 milliGRAM(s) IV Push every 8 hours PRN  sodium bicarbonate 1300 milliGRAM(s) Oral three times a day  traMADol 25 milliGRAM(s) Oral every 6 hours PRN  Was on Eliquis (last dose 12/27 5AM)    Data:  182.9  77.8    T(C): 36.5  HR: 75  BP: 114/74  RR: 18  SpO2: 98%        -WBC 5.77 / HgB 14.0 / Hct 43.6 / Plt 188  -Na 138 / Cl 95 / BUN 57.8 / Glucose 97  -K 4.3 / CO2 27.0 / Cr 1.41  -ALT 19 / Alk Phos 237 / T.Bili 1.0  -INR1.47      Imaging: ___ascites____    Plan:   -61y Male presents for ___paracentesis____

## 2021-12-28 NOTE — PROGRESS NOTE ADULT - ASSESSMENT
61 year old male with history of CAD, Severely Dilated CM/HFrEF s/p ICD, Substance abuse. Atrial Flutter on Eliquis, ETOH use who presented to the ED with complaints of right arm and foot pain with increased shortness of breath due to medication non-compliance and admitted with decompensated heart failure.     #Acute Hypoxic Respiratory Failure secondary to Severely Dilated Cardiomyopathy/HFrEF  - remains hypervolemic but improving  - continue bumex and milrinone gtt per heart failure team  - TTE 11/20/2021 moderate TR and EF <20%  - avoid ACE/ARB due to DERRICK  - telemonitoring   - strict I/os, daily weights  - will discuss possible ischemic evaluation once optimized  - cardio/HF recs appreciated    #Hypervolemic Hyponatremia in the setting of decompensated heart failure - resolved  -sodium stable at 138  -urine lytes reviewed  -continue diuretics as above  -free water restriction    -monitor I/os  -renal consult appreciated    #DERRICK on CKD stage 3  -likely due to cardiorenal syndrome and use of ARB  -baseline creatinine 1.5-1.6  -creatinine improved to 1.4 today  -UA bland, urine sodium < 30  -continue bumex and monitor renal function closely  -continue to hold losartan and aldactone  -continue sodium bicarb TID   -avoid nephrotoxic agents and renally dose medications   -strict I/os, daily weights  -renal recommendations appreciated    #Atrial flutter  - continue amiodarone  - resume eliquis    #CAD  -asymptomatic  -continue aspirin and statin      #DM  -HbA1c 6.3  -ISS  -ADA diet    #Neuropathy  -continue gabapentin    #Substance use  - hx of cocaine use- patient stating has not used for long time   - UDS positive for THC and opoids  - cessation discussed     #Cirrhosis 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 PT home with assist vs [facility]; f/u final recs.    Plan discussed with patient, Dr. Hull, medicine PA, RN

## 2021-12-28 NOTE — PROGRESS NOTE ADULT - SUBJECTIVE AND OBJECTIVE BOX
Subjective:  INCOMPLETE     Medications:  aluminum hydroxide/magnesium hydroxide/simethicone Suspension 30 milliLiter(s) Oral every 4 hours PRN  aMIOdarone    Tablet 200 milliGRAM(s) Oral daily  aspirin enteric coated 81 milliGRAM(s) Oral daily  atorvastatin 40 milliGRAM(s) Oral at bedtime  buMETAnide Infusion 1 mG/Hr IV Continuous <Continuous>  dextrose 40% Gel 15 Gram(s) Oral once  dextrose 5%. 1000 milliLiter(s) IV Continuous <Continuous>  dextrose 5%. 1000 milliLiter(s) IV Continuous <Continuous>  dextrose 50% Injectable 25 Gram(s) IV Push once  dextrose 50% Injectable 12.5 Gram(s) IV Push once  dextrose 50% Injectable 25 Gram(s) IV Push once  gabapentin 100 milliGRAM(s) Oral three times a day  glucagon  Injectable 1 milliGRAM(s) IntraMuscular once  influenza   Vaccine 0.5 milliLiter(s) IntraMuscular once  insulin lispro (ADMELOG) corrective regimen sliding scale   SubCutaneous three times a day before meals  insulin lispro (ADMELOG) corrective regimen sliding scale   SubCutaneous at bedtime  melatonin 3 milliGRAM(s) Oral at bedtime PRN  milrinone Infusion 0.125 MICROgram(s)/kG/Min IV Continuous <Continuous>  ondansetron Injectable 4 milliGRAM(s) IV Push every 8 hours PRN  sodium bicarbonate 1300 milliGRAM(s) Oral three times a day  traMADol 25 milliGRAM(s) Oral every 6 hours PRN    Vitals:  T(C): 36.5 (12-28-21 @ 04:32), Max: 36.7 (12-27-21 @ 19:08)  HR: 75 (12-28-21 @ 04:32) (60 - 77)  BP: 114/74 (12-28-21 @ 04:32) (114/74 - 125/88)  BP(mean): --  RR: 18 (12-28-21 @ 04:32) (18 - 18)  SpO2: 98% (12-28-21 @ 04:32) (92% - 100%)    Daily Height in cm: 182.88 (27 Dec 2021 14:10)    Daily     Weight (kg): 77.8 (12-27 @ 14:10)    I&O's Summary    27 Dec 2021 07:01  -  28 Dec 2021 07:00  --------------------------------------------------------  IN: 480 mL / OUT: 3750 mL / NET: -3270 mL        Physical Exam:  Appearance: No Acute Distress  HEENT: JVP non elevated  Cardiovascular: RRR, Normal S1 S2, No murmurs/rubs/gallops  Respiratory: Clear to auscultation bilaterally  Gastrointestinal: Soft, Non-tender, non-distended	  Skin: no skin lesions  Neurologic: Non-focal  Extremities: No LE edema, warm and well perfused  Psychiatry: A & O x 3, Mood & affect appropriate      Labs:                        14.0   5.77  )-----------( 188      ( 28 Dec 2021 05:52 )             43.6     12-28    138  |  95<L>  |  57.8<H>  ----------------------------<  97  4.3   |  27.0  |  1.41<H>    Ca    8.1<L>      28 Dec 2021 05:48  Phos  2.6     12-28  Mg     2.1     12-28    TPro  6.7  /  Alb  3.8  /  TBili  1.0  /  DBili  x   /  AST  32  /  ALT  19  /  AlkPhos  237<H>  12-28      PT/INR - ( 28 Dec 2021 05:48 )   PT: 16.7 sec;   INR: 1.47 ratio         PTT - ( 27 Dec 2021 07:29 )  PTT:36.0 sec      Serum Pro-Brain Natriuretic Peptide: 41379 pg/mL (12-28 @ 05:48)  Serum Pro-Brain Natriuretic Peptide: 85513 pg/mL (12-23 @ 21:40)      Lactate Dehydrogenase, Serum: 262 U/L (12-28 @ 05:48)       Subjective:  No acute events overnight.   Feels better.     Medications:  aluminum hydroxide/magnesium hydroxide/simethicone Suspension 30 milliLiter(s) Oral every 4 hours PRN  aMIOdarone    Tablet 200 milliGRAM(s) Oral daily  aspirin enteric coated 81 milliGRAM(s) Oral daily  atorvastatin 40 milliGRAM(s) Oral at bedtime  buMETAnide Infusion 1 mG/Hr IV Continuous <Continuous>  dextrose 40% Gel 15 Gram(s) Oral once  dextrose 5%. 1000 milliLiter(s) IV Continuous <Continuous>  dextrose 5%. 1000 milliLiter(s) IV Continuous <Continuous>  dextrose 50% Injectable 25 Gram(s) IV Push once  dextrose 50% Injectable 12.5 Gram(s) IV Push once  dextrose 50% Injectable 25 Gram(s) IV Push once  gabapentin 100 milliGRAM(s) Oral three times a day  glucagon  Injectable 1 milliGRAM(s) IntraMuscular once  influenza   Vaccine 0.5 milliLiter(s) IntraMuscular once  insulin lispro (ADMELOG) corrective regimen sliding scale   SubCutaneous three times a day before meals  insulin lispro (ADMELOG) corrective regimen sliding scale   SubCutaneous at bedtime  melatonin 3 milliGRAM(s) Oral at bedtime PRN  milrinone Infusion 0.125 MICROgram(s)/kG/Min IV Continuous <Continuous>  ondansetron Injectable 4 milliGRAM(s) IV Push every 8 hours PRN  sodium bicarbonate 1300 milliGRAM(s) Oral three times a day  traMADol 25 milliGRAM(s) Oral every 6 hours PRN    Vitals:  T(C): 36.5 (12-28-21 @ 04:32), Max: 36.7 (12-27-21 @ 19:08)  HR: 75 (12-28-21 @ 04:32) (60 - 77)  BP: 114/74 (12-28-21 @ 04:32) (114/74 - 125/88)  BP(mean): --  RR: 18 (12-28-21 @ 04:32) (18 - 18)  SpO2: 98% (12-28-21 @ 04:32) (92% - 100%)    Daily Height in cm: 182.88 (27 Dec 2021 14:10)    Daily     Weight (kg): 77.8 (12-27 @ 14:10)    I&O's Summary    27 Dec 2021 07:01  -  28 Dec 2021 07:00  --------------------------------------------------------  IN: 480 mL / OUT: 3750 mL / NET: -3270 mL        Physical Exam:  Appearance: No Acute Distress  HEENT: JVP 9cm  Cardiovascular: RRR, Normal S1 S2, No murmurs/rubs/gallops  Respiratory: Clear to auscultation bilaterally  Gastrointestinal: Soft, Non-tender, mildly-distended	  Skin: no skin lesions  Neurologic: Non-focal  Extremities: +1-2 LE edema, lukewarm extremities  Psychiatry: A & O x 3, Mood & affect appropriate      Labs:                        14.0   5.77  )-----------( 188      ( 28 Dec 2021 05:52 )             43.6     12-28    138  |  95<L>  |  57.8<H>  ----------------------------<  97  4.3   |  27.0  |  1.41<H>    Ca    8.1<L>      28 Dec 2021 05:48  Phos  2.6     12-28  Mg     2.1     12-28    TPro  6.7  /  Alb  3.8  /  TBili  1.0  /  DBili  x   /  AST  32  /  ALT  19  /  AlkPhos  237<H>  12-28      PT/INR - ( 28 Dec 2021 05:48 )   PT: 16.7 sec;   INR: 1.47 ratio         PTT - ( 27 Dec 2021 07:29 )  PTT:36.0 sec      Serum Pro-Brain Natriuretic Peptide: 95845 pg/mL (12-28 @ 05:48)  Serum Pro-Brain Natriuretic Peptide: 98695 pg/mL (12-23 @ 21:40)      Lactate Dehydrogenase, Serum: 262 U/L (12-28 @ 05:48)

## 2021-12-28 NOTE — PROGRESS NOTE ADULT - ASSESSMENT
We have attempted to contact your patient three times by telephone and have mailed a letter to their home address.  We requested they contact our office to schedule their colonoscopy.  Patient has not returned our messages.  Please discuss with your patient at their next office visit. Please submit a new referral when patient is ready to schedule.           60 y/o with h/o chronic systolic HF ACC/AHA stage C, NICMP LVEF <20% LVIDd 6.96cm, nonobstructive CAD, s/p ICD, HTN, active tobacco use, ?COPD who presented with decompensated HF. HE reports being compliant with medications but not so much with low Na diet due to living in a group home.   Of note, the patient was recently admitted for acute decompensated HF in the setting of aflutter w/ RVR. He underwent successful VANESSA/DCCV on 11/22/21. This is his 4th hospitalization in the past year.     Cards: Alison ([facility])    Pertinent Cardiac Studies  11/18/21 EKG Aflutter LAD. PRWP. NS T wave changes  11/20/21 LVEF <20% LVIDd 6.96cm VTI 5cm, RV severely enlarged with severely reduced systolic HF, sev biatrial enlargement, mod-sev MR, moderate TR, RVSP 38mmHg  11/22/21 VANESSA limited study, LVEF <20%, no FADUMO thrombus  2016 [facility] LM minor irreg, mid LAD 50%, LCx minor irreg, RCA prox 60% mid 85%

## 2021-12-28 NOTE — PROGRESS NOTE ADULT - SUBJECTIVE AND OBJECTIVE BOX
NEPHROLOGY INTERVAL HPI/OVERNIGHT EVENTS:    No new events.    MEDICATIONS  (STANDING):  aMIOdarone    Tablet 200 milliGRAM(s) Oral daily  aspirin enteric coated 81 milliGRAM(s) Oral daily  atorvastatin 40 milliGRAM(s) Oral at bedtime  dextrose 40% Gel 15 Gram(s) Oral once  dextrose 5%. 1000 milliLiter(s) (50 mL/Hr) IV Continuous <Continuous>  dextrose 5%. 1000 milliLiter(s) (100 mL/Hr) IV Continuous <Continuous>  dextrose 50% Injectable 25 Gram(s) IV Push once  dextrose 50% Injectable 12.5 Gram(s) IV Push once  dextrose 50% Injectable 25 Gram(s) IV Push once  gabapentin 100 milliGRAM(s) Oral three times a day  glucagon  Injectable 1 milliGRAM(s) IntraMuscular once  influenza   Vaccine 0.5 milliLiter(s) IntraMuscular once  insulin lispro (ADMELOG) corrective regimen sliding scale   SubCutaneous three times a day before meals  insulin lispro (ADMELOG) corrective regimen sliding scale   SubCutaneous at bedtime  sodium bicarbonate 1300 milliGRAM(s) Oral three times a day    MEDICATIONS  (PRN):  aluminum hydroxide/magnesium hydroxide/simethicone Suspension 30 milliLiter(s) Oral every 4 hours PRN Dyspepsia  melatonin 3 milliGRAM(s) Oral at bedtime PRN Insomnia  ondansetron Injectable 4 milliGRAM(s) IV Push every 8 hours PRN Nausea and/or Vomiting      Allergies    No Known Allergies    Intolerances    pork (Other)      Vital Signs Last 24 Hrs  T(C): 36.3 (28 Dec 2021 10:53), Max: 36.7 (27 Dec 2021 19:08)  T(F): 97.3 (28 Dec 2021 10:53), Max: 98 (27 Dec 2021 19:08)  HR: 80 (28 Dec 2021 10:53) (60 - 80)  BP: 122/85 (28 Dec 2021 10:53) (114/74 - 122/85)  BP(mean): --  RR: 18 (28 Dec 2021 10:53) (18 - 18)  SpO2: 99% (28 Dec 2021 10:53) (92% - 100%)  T(C): 36.6 (27 Dec 2021 04:13), Max: 36.6 (26 Dec 2021 15:54)  T(F): 97.8 (27 Dec 2021 04:13), Max: 97.9 (26 Dec 2021 15:54)  HR: 77 (27 Dec 2021 10:04) (68 - 77)  BP: 125/88 (27 Dec 2021 10:04) (110/70 - 125/88)      PHYSICAL EXAM:    GENERAL: comfortable in bed  HEAD:  wnl  EYES:   ENMT:   NECK: veins  same  NERVOUS SYSTEM: alert, oriented   CHEST/LUNG: no 02, decreased bs bases  HEART: Same  ABDOMEN: Distended , ascites   EXTREMITIES:  Muscle wasting  same  LYMPH:   SKIN: no rash  : Neg    LABS:    12-28    138  |  95<L>  |  57.8<H>  ----------------------------<  97  4.3   |  27.0  |  1.41<H>    Ca    8.1<L>      28 Dec 2021 05:48  Phos  2.6     12-28  Mg     2.1     12-28    TPro  6.7  /  Alb  3.8  /  TBili  1.0  /  DBili  x   /  AST  32  /  ALT  19  /  AlkPhos  237<H>  12-28                          13.3   5.41  )-----------( 237      ( 27 Dec 2021 07:29 )             41.5     12-27    130<L>  |  93<L>  |  71.9<H>  ----------------------------<  138<H>  4.3   |  23.0  |  1.75<H>    Ca    8.1<L>      27 Dec 2021 07:29  Phos  3.3     12-27  Mg     2.3     12-27    TPro  6.6  /  Alb  3.8  /  TBili  1.1  /  DBili  x   /  AST  27  /  ALT  17  /  AlkPhos  239<H>  12-26    PT/INR - ( 27 Dec 2021 07:29 )   PT: 24.0 sec;   INR: 2.15 ratio         PTT - ( 27 Dec 2021 07:29 )  PTT:36.0 sec    Magnesium, Serum: 2.3 mg/dL (12-27 @ 07:29)  Phosphorus Level, Serum: 3.3 mg/dL (12-27 @ 07:29)          RADIOLOGY & ADDITIONAL TESTS:

## 2021-12-28 NOTE — PROGRESS NOTE ADULT - SUBJECTIVE AND OBJECTIVE BOX
CHIEF COMPLAINT/INTERVAL HISTORY:    Patient is a 61y old  Male who presents with a chief complaint of CHF (28 Dec 2021 08:13)      SUBJECTIVE & OBJECTIVE: Pt seen and examined at bedside. No overnight events. Reports feeling better; diuresing well. Off O2. Abdomen improved; declined paracentesis.     ROS: No chest pain, palpitations, SOB, light headedness, dizziness, headache, nausea/vomiting, fevers/chills, abdominal pain, dysuria or increased urinary frequency.    ICU Vital Signs Last 24 Hrs  T(C): 36.3 (28 Dec 2021 10:53), Max: 36.7 (27 Dec 2021 19:08)  T(F): 97.3 (28 Dec 2021 10:53), Max: 98 (27 Dec 2021 19:08)  HR: 80 (28 Dec 2021 10:53) (60 - 80)  BP: 122/85 (28 Dec 2021 10:53) (114/74 - 122/85)  RR: 18 (28 Dec 2021 10:53) (18 - 18)  SpO2: 99% (28 Dec 2021 10:53) (92% - 100%)    MEDICATIONS  (STANDING):  aMIOdarone    Tablet 200 milliGRAM(s) Oral daily  apixaban 5 milliGRAM(s) Oral two times a day  aspirin enteric coated 81 milliGRAM(s) Oral daily  atorvastatin 40 milliGRAM(s) Oral at bedtime  buMETAnide Infusion 1 mG/Hr (5 mL/Hr) IV Continuous <Continuous>  dextrose 40% Gel 15 Gram(s) Oral once  dextrose 5%. 1000 milliLiter(s) (50 mL/Hr) IV Continuous <Continuous>  dextrose 5%. 1000 milliLiter(s) (100 mL/Hr) IV Continuous <Continuous>  dextrose 50% Injectable 25 Gram(s) IV Push once  dextrose 50% Injectable 12.5 Gram(s) IV Push once  dextrose 50% Injectable 25 Gram(s) IV Push once  gabapentin 100 milliGRAM(s) Oral three times a day  glucagon  Injectable 1 milliGRAM(s) IntraMuscular once  influenza   Vaccine 0.5 milliLiter(s) IntraMuscular once  insulin lispro (ADMELOG) corrective regimen sliding scale   SubCutaneous three times a day before meals  insulin lispro (ADMELOG) corrective regimen sliding scale   SubCutaneous at bedtime  milrinone Infusion 0.125 MICROgram(s)/kG/Min (2.92 mL/Hr) IV Continuous <Continuous>  sodium bicarbonate 1300 milliGRAM(s) Oral three times a day    MEDICATIONS  (PRN):  aluminum hydroxide/magnesium hydroxide/simethicone Suspension 30 milliLiter(s) Oral every 4 hours PRN Dyspepsia  melatonin 3 milliGRAM(s) Oral at bedtime PRN Insomnia  ondansetron Injectable 4 milliGRAM(s) IV Push every 8 hours PRN Nausea and/or Vomiting  traMADol 25 milliGRAM(s) Oral every 6 hours PRN Severe Pain (7 - 10)      LABS:                        14.0   5.77  )-----------( 188      ( 28 Dec 2021 05:52 )             43.6     12-28    138  |  95<L>  |  57.8<H>  ----------------------------<  97  4.3   |  27.0  |  1.41<H>    Ca    8.1<L>      28 Dec 2021 05:48  Phos  2.6     12-28  Mg     2.1     12-28    TPro  6.7  /  Alb  3.8  /  TBili  1.0  /  DBili  x   /  AST  32  /  ALT  19  /  AlkPhos  237<H>  12-28    PT/INR - ( 28 Dec 2021 05:48 )   PT: 16.7 sec;   INR: 1.47 ratio         PTT - ( 27 Dec 2021 07:29 )  PTT:36.0 sec      CAPILLARY BLOOD GLUCOSE      POCT Blood Glucose.: 117 mg/dL (28 Dec 2021 11:56)  POCT Blood Glucose.: 120 mg/dL (28 Dec 2021 08:28)  POCT Blood Glucose.: 119 mg/dL (27 Dec 2021 22:16)  POCT Blood Glucose.: 118 mg/dL (27 Dec 2021 17:02)    PHYSICAL EXAM:    GENERAL: elderly male, sitting in bed, NAD  HEAD:  Atraumatic, Normocephalic  EYES: EOMI, PERRLA, conjunctiva and sclera clear  ENMT: Moist mucous membranes  NECK: Supple   NERVOUS SYSTEM:  Alert & Oriented X3   CHEST/LUNG: bilateral air entry  HEART: Regular rate and rhythm; +S1/S2  ABDOMEN: Soft, Nontender, distended, + ascites; Bowel sounds present  EXTREMITIES:  + pedal edema

## 2021-12-29 LAB
ANION GAP SERPL CALC-SCNC: 12 MMOL/L — SIGNIFICANT CHANGE UP (ref 5–17)
APPEARANCE UR: CLEAR — SIGNIFICANT CHANGE UP
BACTERIA # UR AUTO: ABNORMAL
BASOPHILS # BLD AUTO: 0.03 K/UL — SIGNIFICANT CHANGE UP (ref 0–0.2)
BASOPHILS NFR BLD AUTO: 0.6 % — SIGNIFICANT CHANGE UP (ref 0–2)
BILIRUB UR-MCNC: NEGATIVE — SIGNIFICANT CHANGE UP
BUN SERPL-MCNC: 43.7 MG/DL — HIGH (ref 8–20)
CALCIUM SERPL-MCNC: 8.1 MG/DL — LOW (ref 8.6–10.2)
CHLORIDE SERPL-SCNC: 93 MMOL/L — LOW (ref 98–107)
CO2 SERPL-SCNC: 31 MMOL/L — HIGH (ref 22–29)
COLOR SPEC: YELLOW — SIGNIFICANT CHANGE UP
CREAT SERPL-MCNC: 1.19 MG/DL — SIGNIFICANT CHANGE UP (ref 0.5–1.3)
DIFF PNL FLD: NEGATIVE — SIGNIFICANT CHANGE UP
EOSINOPHIL # BLD AUTO: 0.03 K/UL — SIGNIFICANT CHANGE UP (ref 0–0.5)
EOSINOPHIL NFR BLD AUTO: 0.6 % — SIGNIFICANT CHANGE UP (ref 0–6)
EPI CELLS # UR: SIGNIFICANT CHANGE UP
GLUCOSE BLDC GLUCOMTR-MCNC: 113 MG/DL — HIGH (ref 70–99)
GLUCOSE BLDC GLUCOMTR-MCNC: 114 MG/DL — HIGH (ref 70–99)
GLUCOSE BLDC GLUCOMTR-MCNC: 147 MG/DL — HIGH (ref 70–99)
GLUCOSE SERPL-MCNC: 95 MG/DL — SIGNIFICANT CHANGE UP (ref 70–99)
GLUCOSE UR QL: NEGATIVE MG/DL — SIGNIFICANT CHANGE UP
HCT VFR BLD CALC: 42.5 % — SIGNIFICANT CHANGE UP (ref 39–50)
HGB BLD-MCNC: 13.5 G/DL — SIGNIFICANT CHANGE UP (ref 13–17)
IMM GRANULOCYTES NFR BLD AUTO: 0.4 % — SIGNIFICANT CHANGE UP (ref 0–1.5)
KETONES UR-MCNC: NEGATIVE — SIGNIFICANT CHANGE UP
LEUKOCYTE ESTERASE UR-ACNC: NEGATIVE — SIGNIFICANT CHANGE UP
LYMPHOCYTES # BLD AUTO: 0.5 K/UL — LOW (ref 1–3.3)
LYMPHOCYTES # BLD AUTO: 9.3 % — LOW (ref 13–44)
MAGNESIUM SERPL-MCNC: 2 MG/DL — SIGNIFICANT CHANGE UP (ref 1.8–2.6)
MCHC RBC-ENTMCNC: 28.1 PG — SIGNIFICANT CHANGE UP (ref 27–34)
MCHC RBC-ENTMCNC: 31.8 GM/DL — LOW (ref 32–36)
MCV RBC AUTO: 88.5 FL — SIGNIFICANT CHANGE UP (ref 80–100)
MONOCYTES # BLD AUTO: 0.7 K/UL — SIGNIFICANT CHANGE UP (ref 0–0.9)
MONOCYTES NFR BLD AUTO: 13 % — SIGNIFICANT CHANGE UP (ref 2–14)
NEUTROPHILS # BLD AUTO: 4.1 K/UL — SIGNIFICANT CHANGE UP (ref 1.8–7.4)
NEUTROPHILS NFR BLD AUTO: 76.1 % — SIGNIFICANT CHANGE UP (ref 43–77)
NITRITE UR-MCNC: NEGATIVE — SIGNIFICANT CHANGE UP
PH UR: 7 — SIGNIFICANT CHANGE UP (ref 5–8)
PHOSPHATE SERPL-MCNC: 2.4 MG/DL — SIGNIFICANT CHANGE UP (ref 2.4–4.7)
PLATELET # BLD AUTO: 177 K/UL — SIGNIFICANT CHANGE UP (ref 150–400)
POTASSIUM SERPL-MCNC: 4.1 MMOL/L — SIGNIFICANT CHANGE UP (ref 3.5–5.3)
POTASSIUM SERPL-SCNC: 4.1 MMOL/L — SIGNIFICANT CHANGE UP (ref 3.5–5.3)
PROT UR-MCNC: 15 MG/DL
RBC # BLD: 4.8 M/UL — SIGNIFICANT CHANGE UP (ref 4.2–5.8)
RBC # FLD: 15.7 % — HIGH (ref 10.3–14.5)
RBC CASTS # UR COMP ASSIST: NEGATIVE /HPF — SIGNIFICANT CHANGE UP (ref 0–4)
SODIUM SERPL-SCNC: 136 MMOL/L — SIGNIFICANT CHANGE UP (ref 135–145)
SP GR SPEC: 1 — LOW (ref 1.01–1.02)
UROBILINOGEN FLD QL: NEGATIVE MG/DL — SIGNIFICANT CHANGE UP
WBC # BLD: 5.38 K/UL — SIGNIFICANT CHANGE UP (ref 3.8–10.5)
WBC # FLD AUTO: 5.38 K/UL — SIGNIFICANT CHANGE UP (ref 3.8–10.5)
WBC UR QL: SIGNIFICANT CHANGE UP

## 2021-12-29 PROCEDURE — 99233 SBSQ HOSP IP/OBS HIGH 50: CPT

## 2021-12-29 RX ORDER — METOPROLOL TARTRATE 50 MG
100 TABLET ORAL DAILY
Refills: 0 | Status: DISCONTINUED | OUTPATIENT
Start: 2021-12-29 | End: 2021-12-31

## 2021-12-29 RX ORDER — ACETAZOLAMIDE 250 MG/1
500 TABLET ORAL ONCE
Refills: 0 | Status: COMPLETED | OUTPATIENT
Start: 2021-12-29 | End: 2021-12-29

## 2021-12-29 RX ORDER — METOPROLOL TARTRATE 50 MG
100 TABLET ORAL DAILY
Refills: 0 | Status: DISCONTINUED | OUTPATIENT
Start: 2021-12-29 | End: 2021-12-29

## 2021-12-29 RX ADMIN — APIXABAN 5 MILLIGRAM(S): 2.5 TABLET, FILM COATED ORAL at 16:04

## 2021-12-29 RX ADMIN — BUMETANIDE 5 MG/HR: 0.25 INJECTION INTRAMUSCULAR; INTRAVENOUS at 05:23

## 2021-12-29 RX ADMIN — AMIODARONE HYDROCHLORIDE 200 MILLIGRAM(S): 400 TABLET ORAL at 06:03

## 2021-12-29 RX ADMIN — Medication 100 MILLIGRAM(S): at 16:06

## 2021-12-29 RX ADMIN — Medication 81 MILLIGRAM(S): at 11:20

## 2021-12-29 RX ADMIN — APIXABAN 5 MILLIGRAM(S): 2.5 TABLET, FILM COATED ORAL at 06:03

## 2021-12-29 RX ADMIN — ACETAZOLAMIDE 110 MILLIGRAM(S): 250 TABLET ORAL at 17:29

## 2021-12-29 RX ADMIN — GABAPENTIN 100 MILLIGRAM(S): 400 CAPSULE ORAL at 11:17

## 2021-12-29 RX ADMIN — GABAPENTIN 100 MILLIGRAM(S): 400 CAPSULE ORAL at 06:03

## 2021-12-29 NOTE — DIETITIAN INITIAL EVALUATION ADULT. - SIGNS/SYMPTOMS
as evidenced by Acute Hypoxic Respiratory Failure secondary to Severely Dilated Cardiomyopathy/HFrEF

## 2021-12-29 NOTE — PROGRESS NOTE ADULT - SUBJECTIVE AND OBJECTIVE BOX
CC:   INTERVAL HPI/OVERNIGHT EVENTS: Pt seen and examined at bedside this AM by PA. Pt without any acute complaints. Pt denies CP, SOB, abdominal pain, HA, N/V.     REVIEW OF SYSTEMS:  Negative     Allergies: No Known Allergies  Intolerances: pork (Other)    HEALTH ISSUES - PROBLEM Dx:  Acute on chronic systolic heart failure, NYHA class 3  DERRICK (acute kidney injury)  Ascites  H/O atrial flutter  Right arm pain    PAST MEDICAL & SURGICAL HISTORY:  Heart failure, systolic  CAD (coronary artery disease)  Hypertension  Nonischemic cardiomyopathy  No significant past surgical history    VITAL SIGNS:  T(C): 36.7 (12-29-21 @ 11:19), Max: 37 (12-28-21 @ 23:07)  HR: 82 (12-29-21 @ 11:19) (68 - 84)  BP: 105/68 (12-29-21 @ 11:19) (105/68 - 130/78)  RR: 18 (12-29-21 @ 11:19) (18 - 18)  SpO2: 94% (12-29-21 @ 11:19) (94% - 96%)  Wt(kg): --Vital Signs Last 24 Hrs  T(C): 36.7 (29 Dec 2021 11:19), Max: 37 (28 Dec 2021 23:07)  T(F): 98.1 (29 Dec 2021 11:19), Max: 98.6 (28 Dec 2021 23:07)  HR: 82 (29 Dec 2021 11:19) (68 - 84)  BP: 105/68 (29 Dec 2021 11:19) (105/68 - 130/78)  BP(mean): --  RR: 18 (29 Dec 2021 11:19) (18 - 18)  SpO2: 94% (29 Dec 2021 11:19) (94% - 96%)    PHYSICAL EXAM:  GENERAL: Pt lying in bed comfortably in NAD  HEAD:  Atraumatic  EYES: EOMI, PERRL, conjunctiva and sclera clear  ENT: Moist mucous membranes  NECK: Supple, No JVD  CHEST/LUNG: Clear to auscultation bilaterally; No rales, rhonchi, wheezing, or rubs. Unlabored respirations  HEART: Regular rate and rhythm; No murmurs, rubs, or gallops  ABDOMEN: Bowel sounds present; Soft, Nontender, Nondistended. No guarding or rigidity   EXTREMITIES:  2+ Peripheral Pulses, brisk capillary refill. No clubbing, cyanosis, or edema. FROM and strength x 4 extremities.  NERVOUS SYSTEM:  Alert & Oriented X3, speech clear,  No deficits   SKIN: Warm and dry     LABS:                        13.5   5.38  )-----------( 177      ( 29 Dec 2021 07:05 )             42.5     12-29    136  |  93<L>  |  43.7<H>  ----------------------------<  95  4.1   |  31.0<H>  |  1.19    Ca    8.1<L>      29 Dec 2021 07:05  Phos  2.4     12-29  Mg     2.0     12-29    TPro  6.7  /  Alb  3.8  /  TBili  1.0  /  DBili  x   /  AST  32  /  ALT  19  /  AlkPhos  237<H>  12-28    PT/INR - ( 28 Dec 2021 05:48 )   PT: 16.7 sec;   INR: 1.47 ratio              CC:   INTERVAL HPI/OVERNIGHT EVENTS: Pt seen and examined at bedside this AM by PA. Pt without any acute complaints. Pt with 6 beats Vtach overnight, Asx, VSS. Pt denies CP, SOB, abdominal pain, HA, N/V.     REVIEW OF SYSTEMS:  Negative     Allergies: No Known Allergies  Intolerances: pork (Other)    HEALTH ISSUES - PROBLEM Dx:  Acute on chronic systolic heart failure, NYHA class 3  DERRICK (acute kidney injury)  Ascites  H/O atrial flutter  Right arm pain    PAST MEDICAL & SURGICAL HISTORY:  Heart failure, systolic  CAD (coronary artery disease)  Hypertension  Nonischemic cardiomyopathy  No significant past surgical history    VITAL SIGNS:  T(C): 36.7 (12-29-21 @ 11:19), Max: 37 (12-28-21 @ 23:07)  HR: 82 (12-29-21 @ 11:19) (68 - 84)  BP: 105/68 (12-29-21 @ 11:19) (105/68 - 130/78)  RR: 18 (12-29-21 @ 11:19) (18 - 18)  SpO2: 94% (12-29-21 @ 11:19) (94% - 96%)  Wt(kg): --Vital Signs Last 24 Hrs  T(C): 36.7 (29 Dec 2021 11:19), Max: 37 (28 Dec 2021 23:07)  T(F): 98.1 (29 Dec 2021 11:19), Max: 98.6 (28 Dec 2021 23:07)  HR: 82 (29 Dec 2021 11:19) (68 - 84)  BP: 105/68 (29 Dec 2021 11:19) (105/68 - 130/78)  BP(mean): --  RR: 18 (29 Dec 2021 11:19) (18 - 18)  SpO2: 94% (29 Dec 2021 11:19) (94% - 96%)    PHYSICAL EXAM:  GENERAL: Pt lying in bed comfortably in NAD  HEAD:  Atraumatic  CHEST/LUNG: Decreased breath sounds b/l; Unlabored respirations  HEART: Regular rate and rhythm  ABDOMEN: Bowel sounds present; Soft, Nontender, Nondistended  NERVOUS SYSTEM:  Alert & Oriented X3, speech clear  SKIN: Warm and dry     LABS:                        13.5   5.38  )-----------( 177      ( 29 Dec 2021 07:05 )             42.5     12-29    136  |  93<L>  |  43.7<H>  ----------------------------<  95  4.1   |  31.0<H>  |  1.19    Ca    8.1<L>      29 Dec 2021 07:05  Phos  2.4     12-29  Mg     2.0     12-29    TPro  6.7  /  Alb  3.8  /  TBili  1.0  /  DBili  x   /  AST  32  /  ALT  19  /  AlkPhos  237<H>  12-28    PT/INR - ( 28 Dec 2021 05:48 )   PT: 16.7 sec;   INR: 1.47 ratio

## 2021-12-29 NOTE — CHART NOTE - NSCHARTNOTEFT_GEN_A_CORE
Medicine PA- Cd for pt that had x6 beats Vtach, asymptomatic, baseline SR, VSS, labs are pending, continue to monitor. Also earlier shift pt had a nosebleed L. nare that spontaneously stopped with applied pressure, placed on humidified O2, on eliquis, no bleeding since.

## 2021-12-29 NOTE — DIETITIAN INITIAL EVALUATION ADULT. - PERTINENT LABORATORY DATA
12-29 Na136 mmol/L Glu 95 mg/dL K+ 4.1 mmol/L Cr  1.19 mg/dL BUN 43.7 mg/dL<H> Phos 2.4 mg/dL Alb n/a   PAB n/a

## 2021-12-29 NOTE — PROGRESS NOTE ADULT - SUBJECTIVE AND OBJECTIVE BOX
NEPHROLOGY INTERVAL HPI/OVERNIGHT EVENTS:    feels better    + Primacor and Bumex drips  He says he filled up 3 urinals in 5 hours  Cardio follow up noted     MEDICATIONS  (STANDING):  aMIOdarone    Tablet 200 milliGRAM(s) Oral daily  apixaban 5 milliGRAM(s) Oral two times a day  aspirin enteric coated 81 milliGRAM(s) Oral daily  atorvastatin 40 milliGRAM(s) Oral at bedtime  buMETAnide Infusion 1 mG/Hr (5 mL/Hr) IV Continuous <Continuous>  dextrose 40% Gel 15 Gram(s) Oral once  dextrose 5%. 1000 milliLiter(s) (50 mL/Hr) IV Continuous <Continuous>  dextrose 5%. 1000 milliLiter(s) (100 mL/Hr) IV Continuous <Continuous>  dextrose 50% Injectable 25 Gram(s) IV Push once  dextrose 50% Injectable 12.5 Gram(s) IV Push once  dextrose 50% Injectable 25 Gram(s) IV Push once  gabapentin 100 milliGRAM(s) Oral three times a day  glucagon  Injectable 1 milliGRAM(s) IntraMuscular once  influenza   Vaccine 0.5 milliLiter(s) IntraMuscular once  insulin lispro (ADMELOG) corrective regimen sliding scale   SubCutaneous three times a day before meals  insulin lispro (ADMELOG) corrective regimen sliding scale   SubCutaneous at bedtime  metoprolol succinate  milliGRAM(s) Oral daily  milrinone Infusion 0.125 MICROgram(s)/kG/Min (2.92 mL/Hr) IV Continuous <Continuous>    MEDICATIONS  (PRN):  aluminum hydroxide/magnesium hydroxide/simethicone Suspension 30 milliLiter(s) Oral every 4 hours PRN Dyspepsia  melatonin 3 milliGRAM(s) Oral at bedtime PRN Insomnia  ondansetron Injectable 4 milliGRAM(s) IV Push every 8 hours PRN Nausea and/or Vomiting  traMADol 25 milliGRAM(s) Oral every 6 hours PRN Severe Pain (7 - 10)      Allergies    No Known Allergies    Intolerances    pork (Other)          Vital Signs Last 24 Hrs  T(C): 36.7 (29 Dec 2021 11:19), Max: 37 (28 Dec 2021 23:07)  T(F): 98.1 (29 Dec 2021 11:19), Max: 98.6 (28 Dec 2021 23:07)  HR: 82 (29 Dec 2021 11:19) (68 - 84)  BP: 105/68 (29 Dec 2021 11:19) (105/68 - 130/78)  BP(mean): --  RR: 18 (29 Dec 2021 11:19) (18 - 18)  SpO2: 94% (29 Dec 2021 11:19) (94% - 96%)  Daily     Daily   I&O's Detail    28 Dec 2021 07:01  -  29 Dec 2021 07:00  --------------------------------------------------------  IN:    Bumetanide: 15 mL    Milrinone: 8.8 mL    Oral Fluid: 480 mL  Total IN: 503.8 mL    OUT:    Voided (mL): 3150 mL  Total OUT: 3150 mL    Total NET: -2646.2 mL      29 Dec 2021 07:  -  29 Dec 2021 13:49  --------------------------------------------------------  IN:    Bumetanide: 25 mL    Milrinone: 14.5 mL    Oral Fluid: 275 mL  Total IN: 314.5 mL    OUT:    Voided (mL): 450 mL  Total OUT: 450 mL    Total NET: -135.5 mL        I&O's Summary    28 Dec 2021 07:01  -  29 Dec 2021 07:00  --------------------------------------------------------  IN: 503.8 mL / OUT: 3150 mL / NET: -2646.2 mL    29 Dec 2021 07:01  -  29 Dec 2021 13:49  --------------------------------------------------------  IN: 314.5 mL / OUT: 450 mL / NET: -135.5 mL        PHYSICAL EXAM:    GENERAL: NAD,   HEAD:  Atraumatic, No edema   NECK: Supple, + JVP   CHEST/LUNG: EAE , Dec BS at bases   HEART: RRR , No rub   ABDOMEN: decreased distention , no rigidity or rebound   EXTREMITIES:  pitting edema , improved perfusion         LABS:                        13.5   5.38  )-----------( 177      ( 29 Dec 2021 07:05 )             42.5         136  |  93<L>  |  43.7<H>  ----------------------------<  95  4.1   |  31.0<H>  |  1.19    Ca    8.1<L>      29 Dec 2021 07:05  Phos  2.4       Mg     2.0         TPro  6.7  /  Alb  3.8  /  TBili  1.0  /  DBili  x   /  AST  32  /  ALT  19  /  AlkPhos  237<H>      PT/INR - ( 28 Dec 2021 05:48 )   PT: 16.7 sec;   INR: 1.47 ratio           Urinalysis Basic - ( 29 Dec 2021 13:24 )    Color: Yellow / Appearance: Clear / S.005 / pH: x  Gluc: x / Ketone: Negative  / Bili: Negative / Urobili: Negative mg/dL   Blood: x / Protein: 15 mg/dL / Nitrite: Negative   Leuk Esterase: Negative / RBC: Negative /HPF / WBC 0-2   Sq Epi: x / Non Sq Epi: Occasional / Bacteria: Occasional      Magnesium, Serum: 2.0 mg/dL ( @ 07:05)  Phosphorus Level, Serum: 2.4 mg/dL ( @ 07:05)      < from: US Abdomen Complete (US Abdomen Complete .) (21 @ 11:36) >    ACC: [id number] EXAM:  US ABDOMEN COMPLETE                          PROCEDURE DATE:  2021          INTERPRETATION:  CLINICAL INFORMATION: Abdominal distention. Chronic   renal failure    COMPARISON: 2021, ultrasound and CT scan.    TECHNIQUE: Sonography of the abdomen.    FINDINGS:    Liver: Nodular liver suggesting cirrhosis.  Bile ducts: Normal caliber. Common bile duct was not seen.  Gallbladder: Within normal limits.  Pancreas: Not well seen.  Spleen: 11.4 cm. Within normal limits.  Right kidney: 9.7 cm. No hydronephrosis.  Left kidney: 9.1 cm.  No hydronephrosis.  Ascites: Moderate.  Aorta and IVC: Visualized portions are within normal limits.    IMPRESSION: Nodular liver suggests cirrhosis  Moderate amount of ascites    --- End of Report ---            UNIQUE GAMING MD; Attending Radiologist  This document has been electronically signed. Dec 25 2021  1:12PM      Pertinent Cardiac Studies  21 EKG Aflutter LAD. PRWP. NS T wave changes  21 LVEF <20% LVIDd 6.96cm VTI 5cm, RV severely enlarged with severely reduced systolic HF, sev biatrial enlargement, mod-sev MR, moderate TR, RVSP 38mmHg  21 VANESSA limited study, LVEF <20%, no FADUMO thrombus   [facility] LM minor irreg, mid LAD 50%, LCx minor irreg, RCA prox 60% mid 85%      RADIOLOGY & ADDITIONAL TESTS:

## 2021-12-29 NOTE — PROGRESS NOTE ADULT - PROBLEM SELECTOR PLAN 1
ACC/AHA stage C, NICMP (LVEF <20%, LVIDd 6.96cm)  Clinically hypervolemic, cool extremities  Inotropes: Milrinone 0.125mcg/kg/min  Diuretics: Bumex gtt @ 1mg/hr.   Please check BMP q12 hrs while on gtt. Maintain K+ >4.0 and Mg >2.0.  Monitor markers of perfusion daily including lactate, LFTs  Device: s/p ICD  Will reintroduce GDMT as tolerated after diuresis.  Not candidate for advanced therapies due to smoking/cocaine use history and non-compliance. ACC/AHA stage C, NICMP (LVEF <20%, LVIDd 6.96cm)  Clinically hypervolemic, cool extremities. Improving.   Inotropes: Milrinone 0.125mcg/kg/min  Diuretics: Bumex gtt @ 1mg/hr.   Please check BMP q12 hrs while on gtt. Maintain K+ >4.0 and Mg >2.0.  Monitor markers of perfusion daily including lactate, LFTs  Device: s/p ICD  Will reintroduce GDMT as tolerated after diuresis.   GDMT: Please restart bisoprolol 5mg daily today. PLan to add aldactone 25mg daily tomorrow and eventually ACEi/ARB/ARNi if BP allows.   Not candidate for advanced therapies due to smoking/cocaine use history and non-compliance. ACC/AHA stage C, NICMP (LVEF <20%, LVIDd 6.96cm)  Clinically hypervolemic, cool extremities. Improving.   Inotropes: Milrinone 0.125mcg/kg/min  Diuretics: Bumex gtt @ 1mg/hr.   Please check BMP q12 hrs while on gtt. Maintain K+ >4.0 and Mg >2.0.  Monitor markers of perfusion daily including lactate, LFTs  Device: s/p ICD  Will reintroduce GDMT as tolerated after diuresis.   GDMT: Please restart bisoprolol 5mg daily today. PLan to add aldactone 25mg daily tomorrow and eventually ACEi/ARB/ARNi if BP allows.   PLan to start weaning inotropes/bumex gtt in 1-2 days  Not candidate for advanced therapies due to smoking/cocaine use history and non-compliance. ACC/AHA stage C, NICMP (LVEF <20%, LVIDd 6.96cm)  Clinically hypervolemic, cool extremities. Improving.   Inotropes: Milrinone 0.125mcg/kg/min  Diuretics: Bumex gtt @ 1mg/hr. PLease add diamox 500mg IV x 1.  Please check BMP q12 hrs while on gtt. Maintain K+ >4.0 and Mg >2.0.  Monitor markers of perfusion daily including lactate, LFTs  Device: s/p ICD  Will reintroduce GDMT as tolerated after diuresis.   GDMT: Please restart bisoprolol 5mg daily today. PLan to add aldactone 25mg daily tomorrow and eventually ACEi/ARB/ARNi if BP allows.   PLan to start weaning inotropes/bumex gtt in 1-2 days  Not candidate for advanced therapies due to smoking/cocaine use history and non-compliance.

## 2021-12-29 NOTE — PROGRESS NOTE ADULT - ASSESSMENT
Improved DERRICK   Acute on chronic decompensated NICM   Clinically improving   Normal kidneys on ultrasound   [place] [facility]  Cardio plans noted   labs in am

## 2021-12-29 NOTE — DIETITIAN INITIAL EVALUATION ADULT. - ADD RECOMMEND
Continue diet as tolerated. Add glucerna TID to optimize po intake and provide an additional 220kcal, 10g protein per serving. Monitor wts and labs.

## 2021-12-29 NOTE — PROGRESS NOTE ADULT - ASSESSMENT
61 year old male with history of CAD, Severely Dilated CM/HFrEF s/p ICD, Substance abuse. Atrial Flutter on Eliquis, ETOH use who presented to the ED with complaints of right arm and foot pain with increased shortness of breath due to medication non-compliance and admitted with decompensated heart failure.     Acute Hypoxic Respiratory Failure secondary to Severely Dilated Cardiomyopathy/ HFrEF  - Remains hypervolemic but improving  - Continue bumex and milrinone gtt per heart failure team  - TTE 11/20/2021 moderate TR and EF <20%  - Avoid ACE/ARB due to DERRICK  - C/w tele- monitoring   - Strict I/os, daily weights  - Will reintroduce GDMT as tolerated after diuresis   - GDMT: Please restart bisoprolol 5mg daily today. Plan to add aldactone 25mg daily tomorrow and eventually ACEi/ARB/ARNi if BP allows.   - Plan to start weaning inotropes/bumex gtt in 1-2 days  - Not candidate for advanced therapies due to smoking/cocaine use history and non-compliance.  - Cardio/HF reccs appreciated    #Hypervolemic Hyponatremia in the setting of decompensated heart failure - resolved  -sodium stable at 138  -urine lytes reviewed  -continue diuretics as above  -free water restriction    -monitor I/os  -renal consult appreciated    #DERRICK on CKD stage 3  -likely due to cardiorenal syndrome and use of ARB  -baseline creatinine 1.5-1.6  -creatinine improved to 1.4 today  -UA bland, urine sodium < 30  -continue bumex and monitor renal function closely  -continue to hold losartan and aldactone  -continue sodium bicarb TID   -avoid nephrotoxic agents and renally dose medications   -strict I/os, daily weights  -renal recommendations appreciated    #Atrial flutter  - continue amiodarone  - resume eliquis    #CAD  -asymptomatic  -continue aspirin and statin      #DM  -HbA1c 6.3  -ISS  -ADA diet    #Neuropathy  -continue gabapentin    #Substance use  - hx of cocaine use- patient stating has not used for long time   - UDS positive for THC and opoids  - cessation discussed     #Cirrhosis 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 PT home with assist vs [facility]; f/u final recs. 61 year old male with history of CAD, Severely Dilated CM/HFrEF s/p ICD, Substance abuse. Atrial Flutter on Eliquis, ETOH use who presented to the ED with complaints of right arm and foot pain with increased shortness of breath due to medication non-compliance and admitted with decompensated heart failure.     Acute Hypoxic Respiratory Failure secondary to Severely Dilated Cardiomyopathy/ HFrEF  - Remains hypervolemic but improving  - Continue bumex and milrinone gtt per heart failure team  - TTE 11/20/2021 moderate TR and EF <20%  - Avoid ACE/ARB due to DERRICK  - C/w tele- monitoring   - Strict I/os, daily weights  - Restarted bisoprolol 5mg daily today. Plan to add aldactone 25mg daily tomorrow and eventually ACEi/ARB/ARNi if BP allows.   - Plan to start weaning inotropes/bumex gtt in 1-2 days  - Not candidate for advanced therapies due to smoking/cocaine use history and non-compliance.  - Cardio/HF reccs appreciated    Hypervolemic Hyponatremia in the setting of decompensated heart failure - resolved  - Na 136 today   - Urine lytes reviewed  - Continue diuretics as above  - Free water restriction    - Monitor I/os  - Renal consult appreciated    DERRICK on CKD stage 3  - Likely due to cardiorenal syndrome and use of ARB  - Baseline creatinine 1.5-1.6  - Creatinine improved  - UA bland, urine sodium < 30  - Continue bumex and monitor renal function closely  - Continue to hold losartan and aldactone  - Continue sodium bicarb TID   - Avoid nephrotoxic agents and renally dose medications   - Strict I/os, daily weights  - Renal recommendations appreciated    Atrial flutter  - Continue amiodarone  - Resume eliquis    CAD  - Asymptomatic  - Continue aspirin and statin      DM  - HbA1c 6.3  - ISS  - ADA diet    Neuropathy  - Continue gabapentin    Substance use  - Hx of cocaine use- patient stating has not used for long time   - UDS positive for THC and opioids  - Cessation discussed     Cirrhosis likely ETOH related   - Patient reports cutting down ETOH intake  - Abdominal sono with cirrhotic morphology and moderate ascites  - Monitor LFTs  - Continue diuretics  - Outpatient screening EGD for varices  - Will need hepatology referral as outpatient  - Alcohol cessation  - Refused paracentesis    Elevated TSH  - Normal free T3/T4  - No indication for synthroid    DVT ppx - Eliquis      Dispo - Continue bumex and milrinone gtts. PT home with assist vs [facility]; f/u final recs. 61 year old male with history of CAD, Severely Dilated CM/HFrEF s/p ICD, Substance abuse. Atrial Flutter on Eliquis, ETOH use who presented to the ED with complaints of right arm and foot pain with increased shortness of breath due to medication non-compliance and admitted with decompensated heart failure.     Acute Hypoxic Respiratory Failure secondary to Severely Dilated Cardiomyopathy/ HFrEF  - Remains hypervolemic but improving  - Continue bumex and milrinone gtt per heart failure team  - TTE 11/20/2021 moderate TR and EF <20%  - Avoid ACE/ARB due to DERRICK  - C/w tele- monitoring   - Strict I/os, daily weights  - Restarted bisoprolol 5mg daily today. Plan to add aldactone 25mg daily tomorrow and eventually ACEi/ARB/ARNi if BP allows.   - Plan to start weaning inotropes/bumex gtt in 1-2 days  - Will check LFTs and lactate in AM  - Maintain K > 4 and Mag > 2  - Not candidate for advanced therapies due to smoking/cocaine use history and non-compliance.  - Cardio/HF reccs appreciated    Hypervolemic Hyponatremia in the setting of decompensated heart failure - resolved  - Na 136 today   - Urine lytes reviewed  - Continue diuretics as above  - Free water restriction    - Monitor I/os  - Renal consult appreciated    DERRICK on CKD stage 3  - Likely due to cardiorenal syndrome and use of ARB  - Baseline creatinine 1.5-1.6  - Creatinine improved to 1.19  - UA bland, urine sodium < 30  - Continue bumex and monitor renal function closely  - Continue to hold losartan and aldactone  - Continue sodium bicarb TID   - Avoid nephrotoxic agents and renally dose medications   - Strict I/os, daily weights  - Renal recommendations appreciated    Atrial flutter  - Continue amiodarone  - Resume eliquis    CAD  - Asymptomatic  - Continue aspirin and statin      DM  - HbA1c 6.3  - ISS  - ADA diet    Neuropathy  - Continue gabapentin    Substance use  - Hx of cocaine use- patient stating has not used for long time   - UDS positive for THC and opioids  - Cessation discussed     Cirrhosis likely ETOH related   - Patient reports cutting down ETOH intake  - Abdominal sono with cirrhotic morphology and moderate ascites  - Monitor LFTs  - Continue diuretics  - Outpatient screening EGD for varices  - Will need hepatology referral as outpatient  - Alcohol cessation  - Refused paracentesis    Elevated TSH  - Normal free T3/T4  - No indication for synthroid    DVT ppx - Eliquis      Dispo - Continue bumex and milrinone gtts. PT home with assist vs [facility]; f/u final recs.

## 2021-12-29 NOTE — PROGRESS NOTE ADULT - ASSESSMENT
62 y/o with h/o chronic systolic HF ACC/AHA stage C, NICMP LVEF <20% LVIDd 6.96cm, nonobstructive CAD, s/p ICD, HTN, active tobacco use, ?COPD who presented with decompensated HF. HE reports being compliant with medications but not so much with low Na diet due to living in a group home.   Of note, the patient was recently admitted for acute decompensated HF in the setting of aflutter w/ RVR. He underwent successful VANESSA/DCCV on 11/22/21. This is his 4th hospitalization in the past year.     Cards: Alison ([facility])    Pertinent Cardiac Studies  11/18/21 EKG Aflutter LAD. PRWP. NS T wave changes  11/20/21 LVEF <20% LVIDd 6.96cm VTI 5cm, RV severely enlarged with severely reduced systolic HF, sev biatrial enlargement, mod-sev MR, moderate TR, RVSP 38mmHg  11/22/21 VANESSA limited study, LVEF <20%, no FADUMO thrombus  2016 [facility] LM minor irreg, mid LAD 50%, LCx minor irreg, RCA prox 60% mid 85%

## 2021-12-29 NOTE — PROGRESS NOTE ADULT - SUBJECTIVE AND OBJECTIVE BOX
Subjective:  INCOMPLETE NOTE    Medications:  aluminum hydroxide/magnesium hydroxide/simethicone Suspension 30 milliLiter(s) Oral every 4 hours PRN  aMIOdarone    Tablet 200 milliGRAM(s) Oral daily  apixaban 5 milliGRAM(s) Oral two times a day  aspirin enteric coated 81 milliGRAM(s) Oral daily  atorvastatin 40 milliGRAM(s) Oral at bedtime  buMETAnide Infusion 1 mG/Hr IV Continuous <Continuous>  dextrose 40% Gel 15 Gram(s) Oral once  dextrose 5%. 1000 milliLiter(s) IV Continuous <Continuous>  dextrose 5%. 1000 milliLiter(s) IV Continuous <Continuous>  dextrose 50% Injectable 25 Gram(s) IV Push once  dextrose 50% Injectable 12.5 Gram(s) IV Push once  dextrose 50% Injectable 25 Gram(s) IV Push once  gabapentin 100 milliGRAM(s) Oral three times a day  glucagon  Injectable 1 milliGRAM(s) IntraMuscular once  influenza   Vaccine 0.5 milliLiter(s) IntraMuscular once  insulin lispro (ADMELOG) corrective regimen sliding scale   SubCutaneous three times a day before meals  insulin lispro (ADMELOG) corrective regimen sliding scale   SubCutaneous at bedtime  melatonin 3 milliGRAM(s) Oral at bedtime PRN  milrinone Infusion 0.125 MICROgram(s)/kG/Min IV Continuous <Continuous>  ondansetron Injectable 4 milliGRAM(s) IV Push every 8 hours PRN  traMADol 25 milliGRAM(s) Oral every 6 hours PRN    Vitals:  T(C): 36.8 (12-29-21 @ 04:57), Max: 37 (12-28-21 @ 23:07)  HR: 82 (12-29-21 @ 04:57) (68 - 84)  BP: 130/78 (12-29-21 @ 04:57) (110/67 - 130/78)  BP(mean): --  RR: 18 (12-29-21 @ 04:57) (18 - 18)  SpO2: 95% (12-29-21 @ 04:57) (94% - 99%)    Daily     Daily     Weight (kg): 77.8 (12-27 @ 14:10)    I&O's Summary    28 Dec 2021 07:01  -  29 Dec 2021 07:00  --------------------------------------------------------  IN: 503.8 mL / OUT: 3150 mL / NET: -2646.2 mL        Physical Exam:  Appearance: No Acute Distress  HEENT: JVP non elevated  Cardiovascular: RRR, Normal S1 S2, No murmurs/rubs/gallops  Respiratory: Clear to auscultation bilaterally  Gastrointestinal: Soft, Non-tender, non-distended	  Skin: no skin lesions  Neurologic: Non-focal  Extremities: No LE edema, warm and well perfused  Psychiatry: A & O x 3, Mood & affect appropriate      Labs:                        13.5   5.38  )-----------( 177      ( 29 Dec 2021 07:05 )             42.5     12-29    136  |  93<L>  |  43.7<H>  ----------------------------<  95  4.1   |  31.0<H>  |  1.19    Ca    8.1<L>      29 Dec 2021 07:05  Phos  2.4     12-29  Mg     2.0     12-29    TPro  6.7  /  Alb  3.8  /  TBili  1.0  /  DBili  x   /  AST  32  /  ALT  19  /  AlkPhos  237<H>  12-28      PT/INR - ( 28 Dec 2021 05:48 )   PT: 16.7 sec;   INR: 1.47 ratio               Serum Pro-Brain Natriuretic Peptide: 69319 pg/mL (12-28 @ 05:48)  Serum Pro-Brain Natriuretic Peptide: 98611 pg/mL (12-23 @ 21:40)      Lactate Dehydrogenase, Serum: 262 U/L (12-28 @ 05:48)       Subjective:  No acute events overnight. Feels much improved.     Medications:  aluminum hydroxide/magnesium hydroxide/simethicone Suspension 30 milliLiter(s) Oral every 4 hours PRN  aMIOdarone    Tablet 200 milliGRAM(s) Oral daily  apixaban 5 milliGRAM(s) Oral two times a day  aspirin enteric coated 81 milliGRAM(s) Oral daily  atorvastatin 40 milliGRAM(s) Oral at bedtime  buMETAnide Infusion 1 mG/Hr IV Continuous <Continuous>  dextrose 40% Gel 15 Gram(s) Oral once  dextrose 5%. 1000 milliLiter(s) IV Continuous <Continuous>  dextrose 5%. 1000 milliLiter(s) IV Continuous <Continuous>  dextrose 50% Injectable 25 Gram(s) IV Push once  dextrose 50% Injectable 12.5 Gram(s) IV Push once  dextrose 50% Injectable 25 Gram(s) IV Push once  gabapentin 100 milliGRAM(s) Oral three times a day  glucagon  Injectable 1 milliGRAM(s) IntraMuscular once  influenza   Vaccine 0.5 milliLiter(s) IntraMuscular once  insulin lispro (ADMELOG) corrective regimen sliding scale   SubCutaneous three times a day before meals  insulin lispro (ADMELOG) corrective regimen sliding scale   SubCutaneous at bedtime  melatonin 3 milliGRAM(s) Oral at bedtime PRN  milrinone Infusion 0.125 MICROgram(s)/kG/Min IV Continuous <Continuous>  ondansetron Injectable 4 milliGRAM(s) IV Push every 8 hours PRN  traMADol 25 milliGRAM(s) Oral every 6 hours PRN    Vitals:  T(C): 36.8 (12-29-21 @ 04:57), Max: 37 (12-28-21 @ 23:07)  HR: 82 (12-29-21 @ 04:57) (68 - 84)  BP: 130/78 (12-29-21 @ 04:57) (110/67 - 130/78)  BP(mean): --  RR: 18 (12-29-21 @ 04:57) (18 - 18)  SpO2: 95% (12-29-21 @ 04:57) (94% - 99%)    Daily     Daily     Weight (kg): 77.8 (12-27 @ 14:10)    I&O's Summary    28 Dec 2021 07:01  -  29 Dec 2021 07:00  --------------------------------------------------------  IN: 503.8 mL / OUT: 3150 mL / NET: -2646.2 mL        Physical Exam:  Appearance: No Acute Distress  HEENT: JVP 9cm  Cardiovascular: RRR, Normal S1 S2, No murmurs/rubs/gallops  Respiratory: decreased breath sounds at the bases  Gastrointestinal: Soft, Non-tender, minimally-distended	  Skin: no skin lesions  Neurologic: Non-focal  Extremities: +1 LE edema. lukewarm extremities  Psychiatry: A & O x 3, Mood & affect appropriate      Labs:                        13.5   5.38  )-----------( 177      ( 29 Dec 2021 07:05 )             42.5     12-29    136  |  93<L>  |  43.7<H>  ----------------------------<  95  4.1   |  31.0<H>  |  1.19    Ca    8.1<L>      29 Dec 2021 07:05  Phos  2.4     12-29  Mg     2.0     12-29    TPro  6.7  /  Alb  3.8  /  TBili  1.0  /  DBili  x   /  AST  32  /  ALT  19  /  AlkPhos  237<H>  12-28      PT/INR - ( 28 Dec 2021 05:48 )   PT: 16.7 sec;   INR: 1.47 ratio               Serum Pro-Brain Natriuretic Peptide: 82120 pg/mL (12-28 @ 05:48)  Serum Pro-Brain Natriuretic Peptide: 47948 pg/mL (12-23 @ 21:40)      Lactate Dehydrogenase, Serum: 262 U/L (12-28 @ 05:48)

## 2021-12-30 LAB
ALBUMIN SERPL ELPH-MCNC: 4.1 G/DL — SIGNIFICANT CHANGE UP (ref 3.3–5.2)
ALP SERPL-CCNC: 253 U/L — HIGH (ref 40–120)
ALT FLD-CCNC: 27 U/L — SIGNIFICANT CHANGE UP
ANION GAP SERPL CALC-SCNC: 15 MMOL/L — SIGNIFICANT CHANGE UP (ref 5–17)
AST SERPL-CCNC: 43 U/L — HIGH
BASOPHILS # BLD AUTO: 0.05 K/UL — SIGNIFICANT CHANGE UP (ref 0–0.2)
BASOPHILS NFR BLD AUTO: 0.9 % — SIGNIFICANT CHANGE UP (ref 0–2)
BILIRUB DIRECT SERPL-MCNC: 0.6 MG/DL — HIGH (ref 0–0.3)
BILIRUB INDIRECT FLD-MCNC: 0.7 MG/DL — SIGNIFICANT CHANGE UP (ref 0.2–1)
BILIRUB SERPL-MCNC: 1.3 MG/DL — SIGNIFICANT CHANGE UP (ref 0.4–2)
BUN SERPL-MCNC: 42.5 MG/DL — HIGH (ref 8–20)
CALCIUM SERPL-MCNC: 8.7 MG/DL — SIGNIFICANT CHANGE UP (ref 8.6–10.2)
CHLORIDE SERPL-SCNC: 92 MMOL/L — LOW (ref 98–107)
CO2 SERPL-SCNC: 28 MMOL/L — SIGNIFICANT CHANGE UP (ref 22–29)
CREAT SERPL-MCNC: 1.3 MG/DL — SIGNIFICANT CHANGE UP (ref 0.5–1.3)
EOSINOPHIL # BLD AUTO: 0.06 K/UL — SIGNIFICANT CHANGE UP (ref 0–0.5)
EOSINOPHIL NFR BLD AUTO: 1.1 % — SIGNIFICANT CHANGE UP (ref 0–6)
GLUCOSE BLDC GLUCOMTR-MCNC: 106 MG/DL — HIGH (ref 70–99)
GLUCOSE BLDC GLUCOMTR-MCNC: 125 MG/DL — HIGH (ref 70–99)
GLUCOSE BLDC GLUCOMTR-MCNC: 135 MG/DL — HIGH (ref 70–99)
GLUCOSE BLDC GLUCOMTR-MCNC: 148 MG/DL — HIGH (ref 70–99)
GLUCOSE BLDC GLUCOMTR-MCNC: 243 MG/DL — HIGH (ref 70–99)
GLUCOSE SERPL-MCNC: 97 MG/DL — SIGNIFICANT CHANGE UP (ref 70–99)
HCT VFR BLD CALC: 43.9 % — SIGNIFICANT CHANGE UP (ref 39–50)
HGB BLD-MCNC: 14 G/DL — SIGNIFICANT CHANGE UP (ref 13–17)
IMM GRANULOCYTES NFR BLD AUTO: 0.4 % — SIGNIFICANT CHANGE UP (ref 0–1.5)
LACTATE SERPL-SCNC: 1.7 MMOL/L — SIGNIFICANT CHANGE UP (ref 0.5–2)
LYMPHOCYTES # BLD AUTO: 0.78 K/UL — LOW (ref 1–3.3)
LYMPHOCYTES # BLD AUTO: 14.3 % — SIGNIFICANT CHANGE UP (ref 13–44)
MAGNESIUM SERPL-MCNC: 2 MG/DL — SIGNIFICANT CHANGE UP (ref 1.6–2.6)
MCHC RBC-ENTMCNC: 27.9 PG — SIGNIFICANT CHANGE UP (ref 27–34)
MCHC RBC-ENTMCNC: 31.9 GM/DL — LOW (ref 32–36)
MCV RBC AUTO: 87.6 FL — SIGNIFICANT CHANGE UP (ref 80–100)
MONOCYTES # BLD AUTO: 0.8 K/UL — SIGNIFICANT CHANGE UP (ref 0–0.9)
MONOCYTES NFR BLD AUTO: 14.7 % — HIGH (ref 2–14)
NEUTROPHILS # BLD AUTO: 3.74 K/UL — SIGNIFICANT CHANGE UP (ref 1.8–7.4)
NEUTROPHILS NFR BLD AUTO: 68.6 % — SIGNIFICANT CHANGE UP (ref 43–77)
PHOSPHATE SERPL-MCNC: 2.8 MG/DL — SIGNIFICANT CHANGE UP (ref 2.4–4.7)
PLATELET # BLD AUTO: 209 K/UL — SIGNIFICANT CHANGE UP (ref 150–400)
POTASSIUM SERPL-MCNC: 3.4 MMOL/L — LOW (ref 3.5–5.3)
POTASSIUM SERPL-SCNC: 3.4 MMOL/L — LOW (ref 3.5–5.3)
PROT SERPL-MCNC: 7.3 G/DL — SIGNIFICANT CHANGE UP (ref 6.6–8.7)
RBC # BLD: 5.01 M/UL — SIGNIFICANT CHANGE UP (ref 4.2–5.8)
RBC # FLD: 16 % — HIGH (ref 10.3–14.5)
SODIUM SERPL-SCNC: 135 MMOL/L — SIGNIFICANT CHANGE UP (ref 135–145)
WBC # BLD: 5.45 K/UL — SIGNIFICANT CHANGE UP (ref 3.8–10.5)
WBC # FLD AUTO: 5.45 K/UL — SIGNIFICANT CHANGE UP (ref 3.8–10.5)

## 2021-12-30 PROCEDURE — 99233 SBSQ HOSP IP/OBS HIGH 50: CPT

## 2021-12-30 RX ORDER — SPIRONOLACTONE 25 MG/1
25 TABLET, FILM COATED ORAL DAILY
Refills: 0 | Status: DISCONTINUED | OUTPATIENT
Start: 2021-12-30 | End: 2021-12-31

## 2021-12-30 RX ORDER — POTASSIUM CHLORIDE 20 MEQ
40 PACKET (EA) ORAL EVERY 4 HOURS
Refills: 0 | Status: COMPLETED | OUTPATIENT
Start: 2021-12-30 | End: 2021-12-30

## 2021-12-30 RX ADMIN — MILRINONE LACTATE 2.92 MICROGRAM(S)/KG/MIN: 1 INJECTION, SOLUTION INTRAVENOUS at 03:22

## 2021-12-30 RX ADMIN — ATORVASTATIN CALCIUM 40 MILLIGRAM(S): 80 TABLET, FILM COATED ORAL at 21:56

## 2021-12-30 RX ADMIN — APIXABAN 5 MILLIGRAM(S): 2.5 TABLET, FILM COATED ORAL at 17:40

## 2021-12-30 RX ADMIN — SPIRONOLACTONE 25 MILLIGRAM(S): 25 TABLET, FILM COATED ORAL at 17:50

## 2021-12-30 RX ADMIN — Medication 40 MILLIEQUIVALENT(S): at 21:55

## 2021-12-30 RX ADMIN — APIXABAN 5 MILLIGRAM(S): 2.5 TABLET, FILM COATED ORAL at 06:11

## 2021-12-30 RX ADMIN — GABAPENTIN 100 MILLIGRAM(S): 400 CAPSULE ORAL at 13:26

## 2021-12-30 RX ADMIN — GABAPENTIN 100 MILLIGRAM(S): 400 CAPSULE ORAL at 00:13

## 2021-12-30 RX ADMIN — BUMETANIDE 5 MG/HR: 0.25 INJECTION INTRAMUSCULAR; INTRAVENOUS at 03:14

## 2021-12-30 RX ADMIN — Medication 40 MILLIEQUIVALENT(S): at 17:40

## 2021-12-30 RX ADMIN — Medication 100 MILLIGRAM(S): at 06:11

## 2021-12-30 RX ADMIN — ATORVASTATIN CALCIUM 40 MILLIGRAM(S): 80 TABLET, FILM COATED ORAL at 00:13

## 2021-12-30 RX ADMIN — GABAPENTIN 100 MILLIGRAM(S): 400 CAPSULE ORAL at 06:11

## 2021-12-30 RX ADMIN — Medication 81 MILLIGRAM(S): at 11:58

## 2021-12-30 RX ADMIN — AMIODARONE HYDROCHLORIDE 200 MILLIGRAM(S): 400 TABLET ORAL at 06:11

## 2021-12-30 RX ADMIN — GABAPENTIN 100 MILLIGRAM(S): 400 CAPSULE ORAL at 21:58

## 2021-12-30 RX ADMIN — Medication 4: at 11:58

## 2021-12-30 NOTE — PROGRESS NOTE ADULT - ATTENDING COMMENTS
-grossly edematous with +2 pitting LE edema and distended abdomen despite IV Bumex x3 days, agree with HF team switching to Bumex gtt and milrinone for inotrope assisted diuresis and augment renal perfusion, Cr. stable  -remains in sinus on tele, continue PO Amio and Eliquis on hold for possible paracentesis for ascites tomorrow if INR <2 per IR, if >24hr interruption in anticoagulant consider LMWH bridge as recent cardioversion 1 month ago  -still with R-arm pain from bicep region with ecchymosis, warm compress, pain control  -HF team will continue follow, general cardiology will sign off, reconsult if new issue arise            Micah Mullins DO, [facility]  Faculty Non-Invasive Cardiologist  [phone]
I have seen and examined the patient and agree with the above assessment and plan. No overnight events. Off O2 and feels better; serum bicarb improved after diamox. Continue milrinone and bumex per heart failure team. Start aldactone. Replace potassium while on bumex. Rest of the management as above. Prognosis guarded. Patient states he will need to sign out AMA on 12/31 due to personal matters.
61M admitted with acute CHF  Continue IV Bumex 2mg BID  Monitor I's and O's, daily weights, renal function and electrolytes   Continue Amio/Eliquis for recent pAflutter s/p CV  Hold ARB/MRA in setting of DERRICK
61M admitted with acute CHF  Continue IV Bumex 2mg BID  Recorded output not accurate  Monitor I's and O's, daily weights, renal function and electrolytes   Continue Amio/Eliquis for recent pAflutter s/p CV  Hold ARB/MRA in setting of DERRICK (Cr 2 today)  Hyponatremia stable; continue diuresis     Discussed with Dr. Chacon
I have seen and examined the patient and agree with the above and assessment and plan. Patient's edema and abdominal distention improving; continue bumex and milrinone gtts. Restarted on beta-blocker. Monitor BMP, LFTs and lactate. Start aldactone in AM. Renal function continues to improve. Complaining of intermittent right arm pain; no swelling or hematoma appreciated. RUE duplex and XRAY were negative. Rest of the management as outlined above.     PHYSICAL EXAM:    GENERAL: elderly male, sitting in bed, NAD  HEAD:  Atraumatic, Normocephalic  EYES: EOMI, PERRLA, conjunctiva and sclera clear  ENMT: Moist mucous membranes  NECK: Supple   NERVOUS SYSTEM:  Alert & Oriented X3   CHEST/LUNG: bilateral air entry  HEART: Regular rate and rhythm; +S1/S2  ABDOMEN: Soft, Nontender, distended, + ascites; Bowel sounds present  EXTREMITIES:  + pedal edema    Plan discussed with patient, medicine PA, RN

## 2021-12-30 NOTE — PROGRESS NOTE ADULT - SUBJECTIVE AND OBJECTIVE BOX
CC:   INTERVAL HPI/OVERNIGHT EVENTS: Pt seen and examined at bedside this AM by PA. Pt without any acute complaints. Pt denies CP, SOB, abdominal pain, fevers/ chills.    REVIEW OF SYSTEMS:  Negative     Allergies: No Known Allergies    Intolerances: pork (Other)    HEALTH ISSUES - PROBLEM Dx:  Acute on chronic systolic heart failure, NYHA class 3  DERRICK (acute kidney injury)  Ascites  H/O atrial flutter  Right arm pain    PAST MEDICAL & SURGICAL HISTORY:  Heart failure, systolic  CAD (coronary artery disease)  Hypertension  Nonischemic cardiomyopathy  No significant past surgical history    VITAL SIGNS:  T(C): 36.4 (21 @ 11:42), Max: 36.6 (21 @ 05:59)  HR: 72 (21 @ 11:42) (60 - 72)  BP: 101/74 (21 @ 11:42) (101/74 - 114/68)  RR: 18 (21 @ 11:42) (18 - 18)  SpO2: 95% (21 @ 11:42) (95% - 95%)  Wt(kg): --Vital Signs Last 24 Hrs  T(C): 36.4 (30 Dec 2021 11:42), Max: 36.6 (30 Dec 2021 05:59)  T(F): 97.5 (30 Dec 2021 11:42), Max: 97.9 (30 Dec 2021 05:59)  HR: 72 (30 Dec 2021 11:42) (60 - 72)  BP: 101/74 (30 Dec 2021 11:42) (101/74 - 114/68)  BP(mean): --  RR: 18 (30 Dec 2021 11:42) (18 - 18)  SpO2: 95% (30 Dec 2021 11:42) (95% - 95%)    PHYSICAL EXAM:  GENERAL: Pt lying in bed comfortably in NAD  HEAD:  Atraumatic  CHEST/LUNG: Clear to auscultation bilaterally; Unlabored respirations  HEART: Regular rate and rhythm  ABDOMEN: Bowel sounds present; Soft, Nontender, Nondistended  EXTREMITIES:  b/l LE wrapped   NERVOUS SYSTEM:  Alert & Oriented X3, speech clear  SKIN: Warm and dry     LABS:                        14.0   5.45  )-----------( 209      ( 30 Dec 2021 08:22 )             43.9     12    135  |  92<L>  |  42.5<H>  ----------------------------<  97  3.4<L>   |  28.0  |  1.30    Ca    8.7      30 Dec 2021 08:22  Phos  2.8     12  Mg     2.0         TPro  7.3  /  Alb  4.1  /  TBili  1.3  /  DBili  0.6<H>  /  AST  43<H>  /  ALT  27  /  AlkPhos  253<H>  1230        Urinalysis Basic - ( 29 Dec 2021 13:24 )    Color: Yellow / Appearance: Clear / S.005 / pH: x  Gluc: x / Ketone: Negative  / Bili: Negative / Urobili: Negative mg/dL   Blood: x / Protein: 15 mg/dL / Nitrite: Negative   Leuk Esterase: Negative / RBC: Negative /HPF / WBC 0-2   Sq Epi: x / Non Sq Epi: Occasional / Bacteria: Occasional

## 2021-12-30 NOTE — PROGRESS NOTE ADULT - ASSESSMENT
60 y/o with h/o chronic systolic HF ACC/AHA stage C, NICMP LVEF <20% LVIDd 6.96cm, nonobstructive CAD, s/p ICD, HTN, active tobacco use, ?COPD who presented with decompensated HF. HE reports being compliant with medications but not so much with low Na diet due to living in a group home.   Of note, the patient was recently admitted for acute decompensated HF in the setting of aflutter w/ RVR. He underwent successful VANESSA/DCCV on 11/22/21. This is his 4th hospitalization in the past year.     Cards: Alison ([facility])    Pertinent Cardiac Studies  11/18/21 EKG Aflutter LAD. PRWP. NS T wave changes  11/20/21 LVEF <20% LVIDd 6.96cm VTI 5cm, RV severely enlarged with severely reduced systolic HF, sev biatrial enlargement, mod-sev MR, moderate TR, RVSP 38mmHg  11/22/21 VANESSA limited study, LVEF <20%, no FADUMO thrombus  2016 [facility] LM minor irreg, mid LAD 50%, LCx minor irreg, RCA prox 60% mid 85%   62 y/o with h/o chronic systolic HF ACC/AHA stage C, NICMP LVEF <20% LVIDd 6.96cm, nonobstructive CAD, s/p ICD, HTN, active tobacco use, ?COPD who presented with decompensated HF. He reports being compliant with medications but not so much with low Na diet due to living in a group home.   Of note, the patient was recently admitted for acute decompensated HF in the setting of aflutter w/ RVR. He underwent successful VANESSA/DCCV on 11/22/21. This is his 4th hospitalization in the past year. He was started on bumex/milrinone gtt with good response.     Cards: Alison ([facility])    Pertinent Cardiac Studies  11/18/21 EKG Aflutter LAD. PRWP. NS T wave changes  11/20/21 LVEF <20% LVIDd 6.96cm VTI 5cm, RV severely enlarged with severely reduced systolic HF, sev biatrial enlargement, mod-sev MR, moderate TR, RVSP 38mmHg  11/22/21 VANESSA limited study, LVEF <20%, no FADUMO thrombus  2016 [facility] LM minor irreg, mid LAD 50%, LCx minor irreg, RCA prox 60% mid 85%

## 2021-12-30 NOTE — PROGRESS NOTE ADULT - PROBLEM SELECTOR PLAN 1
ACC/AHA stage C, NICMP (LVEF <20%, LVIDd 6.96cm)  Clinically hypervolemic, cool extremities. Improving.   Inotropes: Milrinone 0.125mcg/kg/min  Diuretics: Bumex gtt @ 1mg/hr. PLease add diamox 500mg IV x 1.  Please check BMP q12 hrs while on gtt. Maintain K+ >4.0 and Mg >2.0.  Monitor markers of perfusion daily including lactate, LFTs  Device: s/p ICD  Will reintroduce GDMT as tolerated after diuresis.   GDMT: Please restart bisoprolol 5mg daily today. PLan to add aldactone 25mg daily tomorrow and eventually ACEi/ARB/ARNi if BP allows.   PLan to start weaning inotropes/bumex gtt in 1-2 days  Not candidate for advanced therapies due to smoking/cocaine use history and non-compliance. ACC/AHA stage C, NICMP (LVEF <20%, LVIDd 6.96cm)  Clinically hypervolemic, cool extremities. Improving.   Inotropes: Milrinone 0.125mcg/kg/min  Diuretics: Bumex gtt @ 1mg/hr.   Please check BMP q12 hrs while on gtt. Maintain K+ >4.0 and Mg >2.0.  Monitor markers of perfusion daily including lactate, LFTs  Device: s/p ICD  Will reintroduce GDMT as tolerated after diuresis.   GDMT: bisoprolol 5mg daily today (started on Toprol 100mg daily instead-should monitor BP). Continue aldactone 25mg daily. PLan to ACEi/ARB/ARNi if BP allows. SGLT2i as outpt.  PLan to start weaning inotropes/bumex gtt in 1-2 days  Not candidate for advanced therapies due to smoking/cocaine use history and non-compliance.

## 2021-12-30 NOTE — PROGRESS NOTE ADULT - ASSESSMENT
Improved DERRICK   Acute on chronic decompensated NICM   Clinically improving   Normal kidneys on ultrasound   Lima UA  Cardio plans and med adjustments noted     Hypo K + Supplement     labs in am

## 2021-12-30 NOTE — PROGRESS NOTE ADULT - SUBJECTIVE AND OBJECTIVE BOX
Subjective:  INCOMPLETE NOTE    Medications:  aluminum hydroxide/magnesium hydroxide/simethicone Suspension 30 milliLiter(s) Oral every 4 hours PRN  aMIOdarone    Tablet 200 milliGRAM(s) Oral daily  apixaban 5 milliGRAM(s) Oral two times a day  aspirin enteric coated 81 milliGRAM(s) Oral daily  atorvastatin 40 milliGRAM(s) Oral at bedtime  buMETAnide Infusion 1 mG/Hr IV Continuous <Continuous>  dextrose 40% Gel 15 Gram(s) Oral once  dextrose 5%. 1000 milliLiter(s) IV Continuous <Continuous>  dextrose 5%. 1000 milliLiter(s) IV Continuous <Continuous>  dextrose 50% Injectable 25 Gram(s) IV Push once  dextrose 50% Injectable 12.5 Gram(s) IV Push once  dextrose 50% Injectable 25 Gram(s) IV Push once  gabapentin 100 milliGRAM(s) Oral three times a day  glucagon  Injectable 1 milliGRAM(s) IntraMuscular once  influenza   Vaccine 0.5 milliLiter(s) IntraMuscular once  insulin lispro (ADMELOG) corrective regimen sliding scale   SubCutaneous three times a day before meals  insulin lispro (ADMELOG) corrective regimen sliding scale   SubCutaneous at bedtime  melatonin 3 milliGRAM(s) Oral at bedtime PRN  metoprolol succinate  milliGRAM(s) Oral daily  milrinone Infusion 0.125 MICROgram(s)/kG/Min IV Continuous <Continuous>  ondansetron Injectable 4 milliGRAM(s) IV Push every 8 hours PRN  traMADol 25 milliGRAM(s) Oral every 6 hours PRN    Vitals:  T(C): 36.6 (12-30-21 @ 05:59), Max: 36.7 (12-29-21 @ 11:19)  HR: 68 (12-30-21 @ 05:59) (60 - 82)  BP: 114/68 (12-30-21 @ 05:59) (105/68 - 114/68)  BP(mean): --  RR: 18 (12-30-21 @ 05:59) (18 - 18)  SpO2: 95% (12-30-21 @ 05:59) (94% - 95%)    Daily     Daily     Weight (kg): 72.6 (12-29 @ 10:28)    I&O's Summary    29 Dec 2021 07:01  -  30 Dec 2021 07:00  --------------------------------------------------------  IN: 954 mL / OUT: 2000 mL / NET: -1046 mL        Physical Exam:  Appearance: No Acute Distress  HEENT: JVP non elevated  Cardiovascular: RRR, Normal S1 S2, No murmurs/rubs/gallops  Respiratory: Clear to auscultation bilaterally  Gastrointestinal: Soft, Non-tender, non-distended	  Skin: no skin lesions  Neurologic: Non-focal  Extremities: No LE edema, warm and well perfused  Psychiatry: A & O x 3, Mood & affect appropriate      Labs:                        13.5   5.38  )-----------( 177      ( 29 Dec 2021 07:05 )             42.5     12-29    136  |  93<L>  |  43.7<H>  ----------------------------<  95  4.1   |  31.0<H>  |  1.19    Ca    8.1<L>      29 Dec 2021 07:05  Phos  2.4     12-29  Mg     2.0     12-29              Serum Pro-Brain Natriuretic Peptide: 34456 pg/mL (12-28 @ 05:48)  Serum Pro-Brain Natriuretic Peptide: 23103 pg/mL (12-23 @ 21:40)      Lactate Dehydrogenase, Serum: 262 U/L (12-28 @ 05:48)       Subjective:  No acute events overnight.   Telemetry showed NSVT 3 beats.     Medications:  aluminum hydroxide/magnesium hydroxide/simethicone Suspension 30 milliLiter(s) Oral every 4 hours PRN  aMIOdarone    Tablet 200 milliGRAM(s) Oral daily  apixaban 5 milliGRAM(s) Oral two times a day  aspirin enteric coated 81 milliGRAM(s) Oral daily  atorvastatin 40 milliGRAM(s) Oral at bedtime  buMETAnide Infusion 1 mG/Hr IV Continuous <Continuous>  dextrose 40% Gel 15 Gram(s) Oral once  dextrose 5%. 1000 milliLiter(s) IV Continuous <Continuous>  dextrose 5%. 1000 milliLiter(s) IV Continuous <Continuous>  dextrose 50% Injectable 25 Gram(s) IV Push once  dextrose 50% Injectable 12.5 Gram(s) IV Push once  dextrose 50% Injectable 25 Gram(s) IV Push once  gabapentin 100 milliGRAM(s) Oral three times a day  glucagon  Injectable 1 milliGRAM(s) IntraMuscular once  influenza   Vaccine 0.5 milliLiter(s) IntraMuscular once  insulin lispro (ADMELOG) corrective regimen sliding scale   SubCutaneous three times a day before meals  insulin lispro (ADMELOG) corrective regimen sliding scale   SubCutaneous at bedtime  melatonin 3 milliGRAM(s) Oral at bedtime PRN  metoprolol succinate  milliGRAM(s) Oral daily  milrinone Infusion 0.125 MICROgram(s)/kG/Min IV Continuous <Continuous>  ondansetron Injectable 4 milliGRAM(s) IV Push every 8 hours PRN  traMADol 25 milliGRAM(s) Oral every 6 hours PRN    Vitals:  T(C): 36.6 (12-30-21 @ 05:59), Max: 36.7 (12-29-21 @ 11:19)  HR: 68 (12-30-21 @ 05:59) (60 - 82)  BP: 114/68 (12-30-21 @ 05:59) (105/68 - 114/68)  BP(mean): --  RR: 18 (12-30-21 @ 05:59) (18 - 18)  SpO2: 95% (12-30-21 @ 05:59) (94% - 95%)    Daily     Daily     Weight (kg): 72.6 (12-29 @ 10:28)    I&O's Summary    29 Dec 2021 07:01  -  30 Dec 2021 07:00  --------------------------------------------------------  IN: 954 mL / OUT: 2000 mL / NET: -1046 mL        Physical Exam:  Appearance: No Acute Distress  HEENT: JVP 9cm  Cardiovascular: RRR, Normal S1 S2, No murmurs/rubs/gallops  Respiratory: Clear to auscultation bilaterally  Gastrointestinal: Soft, Non-tender, non-distended	  Skin: no skin lesions  Neurologic: Non-focal  Extremities: +1 LE edema, lukewarm extremities  Psychiatry: A & O x 3, Mood & affect appropriate      Labs:                        13.5   5.38  )-----------( 177      ( 29 Dec 2021 07:05 )             42.5     12-29    136  |  93<L>  |  43.7<H>  ----------------------------<  95  4.1   |  31.0<H>  |  1.19    Ca    8.1<L>      29 Dec 2021 07:05  Phos  2.4     12-29  Mg     2.0     12-29              Serum Pro-Brain Natriuretic Peptide: 06476 pg/mL (12-28 @ 05:48)  Serum Pro-Brain Natriuretic Peptide: 86512 pg/mL (12-23 @ 21:40)      Lactate Dehydrogenase, Serum: 262 U/L (12-28 @ 05:48)

## 2021-12-30 NOTE — PROGRESS NOTE ADULT - ASSESSMENT
61 year old male with history of CAD, Severely Dilated CM/HFrEF s/p ICD, Substance abuse. Atrial Flutter on Eliquis, ETOH use who presented to the ED with complaints of right arm and foot pain with increased shortness of breath due to medication non-compliance and admitted with decompensated heart failure.     Acute Hypoxic Respiratory Failure secondary to Severely Dilated Cardiomyopathy/ HFrEF  - Remains hypervolemic but improving  - Continue bumex and milrinone gtt per heart failure team  - TTE 11/20/2021 moderate TR and EF <20%  - Avoid ACE/ARB due to DERRICK  - C/w tele- monitoring   - Strict I/os, daily weights  - C/w bisoprolol 5mg daily today  - Added aldactone 25mg daily with holding parameters and eventually ACEi/ARB/ARNi if BP allows.   - Plan to start weaning inotropes/ bumex gtt in 1-2 days  - Maintain K > 4 and Mag > 2  - Not candidate for advanced therapies due to smoking/cocaine use history and non-compliance  - Cardio/HF reccs appreciated    Hypervolemic Hyponatremia in the setting of decompensated heart failure - resolved  - Na 135 today   - Urine lytes reviewed  - Continue diuretics as above  - Free water restriction    - Monitor I/os  - Renal consult appreciated    DERRICK on CKD stage 3  - Likely due to cardiorenal syndrome and use of ARB  - Baseline creatinine 1.5-1.6  - Creatinine improved to 1.30  - UA bland, urine sodium < 30  - Continue bumex and monitor renal function closely  - Continue to hold losartan and aldactone  - Continue sodium bicarb TID   - Avoid nephrotoxic agents and renally dose medications   - Strict I/os, daily weights  - Renal recommendations appreciated    Atrial flutter  - Continue amiodarone  - Resume eliquis    CAD  - Asymptomatic  - Continue aspirin and statin      DM  - HbA1c 6.3  - ISS  - ADA diet    Neuropathy  - Continue gabapentin    Substance use  - Hx of cocaine use- patient stating has not used for long time   - UDS positive for THC and opioids  - Cessation discussed     Cirrhosis 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

## 2021-12-30 NOTE — PROGRESS NOTE ADULT - SUBJECTIVE AND OBJECTIVE BOX
NEPHROLOGY INTERVAL HPI/OVERNIGHT EVENTS:    Feels better   cardio follow up and med adjustments noted   No new events     MEDICATIONS  (STANDING):  aMIOdarone    Tablet 200 milliGRAM(s) Oral daily  apixaban 5 milliGRAM(s) Oral two times a day  aspirin enteric coated 81 milliGRAM(s) Oral daily  atorvastatin 40 milliGRAM(s) Oral at bedtime  buMETAnide Infusion 1 mG/Hr (5 mL/Hr) IV Continuous <Continuous>  dextrose 40% Gel 15 Gram(s) Oral once  dextrose 5%. 1000 milliLiter(s) (50 mL/Hr) IV Continuous <Continuous>  dextrose 5%. 1000 milliLiter(s) (100 mL/Hr) IV Continuous <Continuous>  dextrose 50% Injectable 25 Gram(s) IV Push once  dextrose 50% Injectable 12.5 Gram(s) IV Push once  dextrose 50% Injectable 25 Gram(s) IV Push once  gabapentin 100 milliGRAM(s) Oral three times a day  glucagon  Injectable 1 milliGRAM(s) IntraMuscular once  influenza   Vaccine 0.5 milliLiter(s) IntraMuscular once  insulin lispro (ADMELOG) corrective regimen sliding scale   SubCutaneous three times a day before meals  insulin lispro (ADMELOG) corrective regimen sliding scale   SubCutaneous at bedtime  metoprolol succinate  milliGRAM(s) Oral daily  milrinone Infusion 0.125 MICROgram(s)/kG/Min (2.92 mL/Hr) IV Continuous <Continuous>    MEDICATIONS  (PRN):  aluminum hydroxide/magnesium hydroxide/simethicone Suspension 30 milliLiter(s) Oral every 4 hours PRN Dyspepsia  melatonin 3 milliGRAM(s) Oral at bedtime PRN Insomnia  ondansetron Injectable 4 milliGRAM(s) IV Push every 8 hours PRN Nausea and/or Vomiting  traMADol 25 milliGRAM(s) Oral every 6 hours PRN Severe Pain (7 - 10)      Allergies    No Known Allergies    Intolerances    pork (Other)      Vital Signs Last 24 Hrs  T(C): 36.4 (30 Dec 2021 11:42), Max: 36.6 (30 Dec 2021 05:59)  T(F): 97.5 (30 Dec 2021 11:42), Max: 97.9 (30 Dec 2021 05:59)  HR: 72 (30 Dec 2021 11:42) (60 - 72)  BP: 101/74 (30 Dec 2021 11:42) (101/74 - 114/68)  BP(mean): --  RR: 18 (30 Dec 2021 11:42) (18 - 18)  SpO2: 95% (30 Dec 2021 11:42) (95% - 95%)  Daily     Daily   I&O's Detail    29 Dec 2021 07:01  -  30 Dec 2021 07:00  --------------------------------------------------------  IN:    Bumetanide: 50 mL    Milrinone: 29 mL    Oral Fluid: 875 mL  Total IN: 954 mL    OUT:    Voided (mL): 2000 mL  Total OUT: 2000 mL    Total NET: -1046 mL        I&O's Summary    29 Dec 2021 07:01  -  30 Dec 2021 07:00  --------------------------------------------------------  IN: 954 mL / OUT: 2000 mL / NET: -1046 mL      GENERAL: NAD,   HEAD:  Atraumatic, No edema   NECK: Supple, + JVP   CHEST/LUNG: EAE , Dec BS at bases   HEART: RRR , No rub   ABDOMEN: decreased distention , no rigidity or rebound   EXTREMITIES:  pitting edema , improved perfusion   LABS:                        14.0   5.45  )-----------( 209      ( 30 Dec 2021 08:22 )             43.9         135  |  92<L>  |  42.5<H>  ----------------------------<  97  3.4<L>   |  28.0  |  1.30    Ca    8.7      30 Dec 2021 08:22  Phos  2.8       Mg     2.0         TPro  7.3  /  Alb  4.1  /  TBili  1.3  /  DBili  0.6<H>  /  AST  43<H>  /  ALT  27  /  AlkPhos  253<H>  12      Urinalysis Basic - ( 29 Dec 2021 13:24 )    Color: Yellow / Appearance: Clear / S.005 / pH: x  Gluc: x / Ketone: Negative  / Bili: Negative / Urobili: Negative mg/dL   Blood: x / Protein: 15 mg/dL / Nitrite: Negative   Leuk Esterase: Negative / RBC: Negative /HPF / WBC 0-2   Sq Epi: x / Non Sq Epi: Occasional / Bacteria: Occasional      Magnesium, Serum: 2.0 mg/dL ( @ 08:22)  Phosphorus Level, Serum: 2.8 mg/dL ( @ 08:22)          RADIOLOGY & ADDITIONAL TESTS:

## 2021-12-31 ENCOUNTER — TRANSCRIPTION ENCOUNTER (OUTPATIENT)
Age: 61
End: 2021-12-31

## 2021-12-31 ENCOUNTER — INPATIENT (INPATIENT)
Facility: HOSPITAL | Age: 61
LOS: 17 days | Discharge: NURSING, REHAB OR LONG-TERM CARE | DRG: 291 | End: 2022-01-18
Attending: HOSPITALIST | Admitting: HOSPITALIST
Payer: MEDICAID

## 2021-12-31 VITALS
SYSTOLIC BLOOD PRESSURE: 106 MMHG | DIASTOLIC BLOOD PRESSURE: 69 MMHG | HEIGHT: 72 IN | RESPIRATION RATE: 18 BRPM | TEMPERATURE: 98 F | HEART RATE: 68 BPM | OXYGEN SATURATION: 99 %

## 2021-12-31 VITALS
SYSTOLIC BLOOD PRESSURE: 108 MMHG | DIASTOLIC BLOOD PRESSURE: 73 MMHG | OXYGEN SATURATION: 94 % | TEMPERATURE: 98 F | HEART RATE: 70 BPM | RESPIRATION RATE: 18 BRPM

## 2021-12-31 DIAGNOSIS — I42.0 DILATED CARDIOMYOPATHY: ICD-10-CM

## 2021-12-31 LAB
ANION GAP SERPL CALC-SCNC: 18 MMOL/L — HIGH (ref 5–17)
BASOPHILS # BLD AUTO: 0.06 K/UL — SIGNIFICANT CHANGE UP (ref 0–0.2)
BASOPHILS NFR BLD AUTO: 1.2 % — SIGNIFICANT CHANGE UP (ref 0–2)
BUN SERPL-MCNC: 50.8 MG/DL — HIGH (ref 8–20)
CALCIUM SERPL-MCNC: 8.4 MG/DL — LOW (ref 8.6–10.2)
CHLORIDE SERPL-SCNC: 94 MMOL/L — LOW (ref 98–107)
CO2 SERPL-SCNC: 22 MMOL/L — SIGNIFICANT CHANGE UP (ref 22–29)
CREAT SERPL-MCNC: 1.5 MG/DL — HIGH (ref 0.5–1.3)
EOSINOPHIL # BLD AUTO: 0.07 K/UL — SIGNIFICANT CHANGE UP (ref 0–0.5)
EOSINOPHIL NFR BLD AUTO: 1.4 % — SIGNIFICANT CHANGE UP (ref 0–6)
GLUCOSE BLDC GLUCOMTR-MCNC: 111 MG/DL — HIGH (ref 70–99)
GLUCOSE BLDC GLUCOMTR-MCNC: 127 MG/DL — HIGH (ref 70–99)
GLUCOSE SERPL-MCNC: 102 MG/DL — HIGH (ref 70–99)
HCT VFR BLD CALC: 43.2 % — SIGNIFICANT CHANGE UP (ref 39–50)
HGB BLD-MCNC: 13.9 G/DL — SIGNIFICANT CHANGE UP (ref 13–17)
IMM GRANULOCYTES NFR BLD AUTO: 0.2 % — SIGNIFICANT CHANGE UP (ref 0–1.5)
LYMPHOCYTES # BLD AUTO: 0.69 K/UL — LOW (ref 1–3.3)
LYMPHOCYTES # BLD AUTO: 13.9 % — SIGNIFICANT CHANGE UP (ref 13–44)
MAGNESIUM SERPL-MCNC: 2 MG/DL — SIGNIFICANT CHANGE UP (ref 1.8–2.6)
MCHC RBC-ENTMCNC: 27.9 PG — SIGNIFICANT CHANGE UP (ref 27–34)
MCHC RBC-ENTMCNC: 32.2 GM/DL — SIGNIFICANT CHANGE UP (ref 32–36)
MCV RBC AUTO: 86.6 FL — SIGNIFICANT CHANGE UP (ref 80–100)
MONOCYTES # BLD AUTO: 0.83 K/UL — SIGNIFICANT CHANGE UP (ref 0–0.9)
MONOCYTES NFR BLD AUTO: 16.7 % — HIGH (ref 2–14)
NEUTROPHILS # BLD AUTO: 3.32 K/UL — SIGNIFICANT CHANGE UP (ref 1.8–7.4)
NEUTROPHILS NFR BLD AUTO: 66.6 % — SIGNIFICANT CHANGE UP (ref 43–77)
PHOSPHATE SERPL-MCNC: 3.4 MG/DL — SIGNIFICANT CHANGE UP (ref 2.4–4.7)
PLATELET # BLD AUTO: 209 K/UL — SIGNIFICANT CHANGE UP (ref 150–400)
POTASSIUM SERPL-MCNC: 3.8 MMOL/L — SIGNIFICANT CHANGE UP (ref 3.5–5.3)
POTASSIUM SERPL-SCNC: 3.8 MMOL/L — SIGNIFICANT CHANGE UP (ref 3.5–5.3)
RBC # BLD: 4.99 M/UL — SIGNIFICANT CHANGE UP (ref 4.2–5.8)
RBC # FLD: 15.9 % — HIGH (ref 10.3–14.5)
SODIUM SERPL-SCNC: 134 MMOL/L — LOW (ref 135–145)
WBC # BLD: 4.98 K/UL — SIGNIFICANT CHANGE UP (ref 3.8–10.5)
WBC # FLD AUTO: 4.98 K/UL — SIGNIFICANT CHANGE UP (ref 3.8–10.5)

## 2021-12-31 PROCEDURE — 84484 ASSAY OF TROPONIN QUANT: CPT

## 2021-12-31 PROCEDURE — 80307 DRUG TEST PRSMV CHEM ANLYZR: CPT

## 2021-12-31 PROCEDURE — 71045 X-RAY EXAM CHEST 1 VIEW: CPT

## 2021-12-31 PROCEDURE — 80076 HEPATIC FUNCTION PANEL: CPT

## 2021-12-31 PROCEDURE — 84133 ASSAY OF URINE POTASSIUM: CPT

## 2021-12-31 PROCEDURE — 80048 BASIC METABOLIC PNL TOTAL CA: CPT

## 2021-12-31 PROCEDURE — 83605 ASSAY OF LACTIC ACID: CPT

## 2021-12-31 PROCEDURE — 81003 URINALYSIS AUTO W/O SCOPE: CPT

## 2021-12-31 PROCEDURE — 85730 THROMBOPLASTIN TIME PARTIAL: CPT

## 2021-12-31 PROCEDURE — 73630 X-RAY EXAM OF FOOT: CPT

## 2021-12-31 PROCEDURE — 84100 ASSAY OF PHOSPHORUS: CPT

## 2021-12-31 PROCEDURE — 73700 CT LOWER EXTREMITY W/O DYE: CPT | Mod: MA

## 2021-12-31 PROCEDURE — 99233 SBSQ HOSP IP/OBS HIGH 50: CPT

## 2021-12-31 PROCEDURE — 93005 ELECTROCARDIOGRAM TRACING: CPT

## 2021-12-31 PROCEDURE — 36415 COLL VENOUS BLD VENIPUNCTURE: CPT

## 2021-12-31 PROCEDURE — 99285 EMERGENCY DEPT VISIT HI MDM: CPT

## 2021-12-31 PROCEDURE — 80053 COMPREHEN METABOLIC PANEL: CPT

## 2021-12-31 PROCEDURE — 85610 PROTHROMBIN TIME: CPT

## 2021-12-31 PROCEDURE — 76700 US EXAM ABDOM COMPLETE: CPT

## 2021-12-31 PROCEDURE — 84443 ASSAY THYROID STIM HORMONE: CPT

## 2021-12-31 PROCEDURE — 84439 ASSAY OF FREE THYROXINE: CPT

## 2021-12-31 PROCEDURE — 83615 LACTATE (LD) (LDH) ENZYME: CPT

## 2021-12-31 PROCEDURE — 93971 EXTREMITY STUDY: CPT

## 2021-12-31 PROCEDURE — 0225U NFCT DS DNA&RNA 21 SARSCOV2: CPT

## 2021-12-31 PROCEDURE — 81001 URINALYSIS AUTO W/SCOPE: CPT

## 2021-12-31 PROCEDURE — 83880 ASSAY OF NATRIURETIC PEPTIDE: CPT

## 2021-12-31 PROCEDURE — 84300 ASSAY OF URINE SODIUM: CPT

## 2021-12-31 PROCEDURE — 96374 THER/PROPH/DIAG INJ IV PUSH: CPT

## 2021-12-31 PROCEDURE — 83735 ASSAY OF MAGNESIUM: CPT

## 2021-12-31 PROCEDURE — 93970 EXTREMITY STUDY: CPT

## 2021-12-31 PROCEDURE — 93010 ELECTROCARDIOGRAM REPORT: CPT

## 2021-12-31 PROCEDURE — 85025 COMPLETE CBC W/AUTO DIFF WBC: CPT

## 2021-12-31 PROCEDURE — 82962 GLUCOSE BLOOD TEST: CPT

## 2021-12-31 PROCEDURE — 97163 PT EVAL HIGH COMPLEX 45 MIN: CPT

## 2021-12-31 PROCEDURE — 84481 FREE ASSAY (FT-3): CPT

## 2021-12-31 PROCEDURE — 99239 HOSP IP/OBS DSCHRG MGMT >30: CPT

## 2021-12-31 PROCEDURE — 83935 ASSAY OF URINE OSMOLALITY: CPT

## 2021-12-31 RX ADMIN — APIXABAN 5 MILLIGRAM(S): 2.5 TABLET, FILM COATED ORAL at 05:54

## 2021-12-31 RX ADMIN — Medication 81 MILLIGRAM(S): at 11:22

## 2021-12-31 RX ADMIN — BUMETANIDE 5 MG/HR: 0.25 INJECTION INTRAMUSCULAR; INTRAVENOUS at 05:12

## 2021-12-31 RX ADMIN — SPIRONOLACTONE 25 MILLIGRAM(S): 25 TABLET, FILM COATED ORAL at 06:09

## 2021-12-31 RX ADMIN — GABAPENTIN 100 MILLIGRAM(S): 400 CAPSULE ORAL at 05:53

## 2021-12-31 RX ADMIN — AMIODARONE HYDROCHLORIDE 200 MILLIGRAM(S): 400 TABLET ORAL at 05:54

## 2021-12-31 RX ADMIN — Medication 100 MILLIGRAM(S): at 06:09

## 2021-12-31 NOTE — PROGRESS NOTE ADULT - PROBLEM SELECTOR PLAN 4
s/p VANESSA cardioversion 11/22/21  Remains in SR  On Amiodarone 200mg PO daily  AC: Eliquis.

## 2021-12-31 NOTE — ED PROVIDER NOTE - OBJECTIVE STATEMENT
60 y/o male with PMHx of CHF c/o SOB. Pt was an admitted pt and signed out AMA earlier today to get his stuff his out of his house because his landlord was going to throw it out. Pt denies chest pain at this time.

## 2021-12-31 NOTE — PROGRESS NOTE ADULT - ASSESSMENT
62 y/o with h/o chronic systolic HF ACC/AHA stage C, NICMP LVEF <20% LVIDd 6.96cm, nonobstructive CAD, s/p ICD, HTN, active tobacco use, ?COPD who presented with decompensated HF. He reports being compliant with medications but not so much with low Na diet due to living in a group home.   Of note, the patient was recently admitted for acute decompensated HF in the setting of aflutter w/ RVR. He underwent successful VANESSA/DCCV on 11/22/21. This is his 4th hospitalization in the past year. He was started on bumex/milrinone gtt with good response.     Cards: Alison ([facility])    Pertinent Cardiac Studies  11/18/21 EKG Aflutter LAD. PRWP. NS T wave changes  11/20/21 LVEF <20% LVIDd 6.96cm VTI 5cm, RV severely enlarged with severely reduced systolic HF, sev biatrial enlargement, mod-sev MR, moderate TR, RVSP 38mmHg  11/22/21 VANESSA limited study, LVEF <20%, no FADUMO thrombus  2016 [facility] LM minor irreg, mid LAD 50%, LCx minor irreg, RCA prox 60% mid 85%

## 2021-12-31 NOTE — DISCHARGE NOTE PROVIDER - HOSPITAL COURSE
61 year old male with history of CAD, Severely Dilated CM/HFrEF s/p ICD, Substance abuse. Atrial Flutter on Eliquis, ETOH use who presented to the ED with complaints of right arm and foot pain with increased shortness of breath due to medication non-compliance and admitted with decompensated heart failure.     Acute Hypoxic Respiratory Failure secondary to Severely Dilated Cardiomyopathy/ HFrEF  - improving on milrinone and bumex drips   - TTE 11/20/2021 moderate TR and EF <20%  - Avoid ACE/ARB due to DERRICK  - Strict I/os, daily weights  - C/w toprol 100 mg daily and aldactone 25 mg daily  - Not candidate for advanced therapies due to smoking/cocaine use history and non-compliance  - Cardio/HF reccs appreciated; patient is signing out AMA given personal issues and states he will return back to the ED later today    Hypervolemic Hyponatremia in the setting of decompensated heart failure - resolved  - Na 134  - Urine lytes reviewed  - Continue diuretics as above  - Free water restriction    - Monitor I/os  - Renal consult appreciated    DERRICK on CKD stage 3  - Likely due to cardiorenal syndrome and use of ARB  - Baseline creatinine 1.5-1.6  - Creatinine uptrending to 1.5 after reinitiation of aldactone but patient is signing out AMA given personal issues  - UA bland, urine sodium < 30  - Continue to hold losartan    - Continue sodium bicarb TID   - Avoid nephrotoxic agents and renally dose medications   - Renal recommendations appreciated    Atrial flutter  - Continue amiodarone and eliquis     CAD  - Asymptomatic  - Continue aspirin and statin      DM  - HbA1c 6.3  - ISS  - ADA diet    Neuropathy  - Continue gabapentin    Substance use  - Hx of cocaine use- patient stating has not used for long time   - UDS positive for THC and opioids  - Cessation discussed     Cirrhosis 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 related   - Patient reports cutting down ETOH intake  - Abdominal sono with cirrhotic morphology and moderate ascites  - Monitor LFTs  - Continue diuretics  - Outpatient screening EGD for varices  - Will need hepatology referral as outpatient  - Alcohol cessation  - Refused paracentesis    Elevated TSH  - Normal free T3/T4  - No indication for synthroid    Patient remains acute requiring management of heart failure, but has a personal matter (getting evicted and needs to get all his belongings) that he must attend to and is opting to sign out against medical advice despite knowing the risks of further cardiac decompensation, including arrhthymias and death. Patient states he will return back to the ED later tonight after he moves out all his belongings. 61 year old male with history of CAD, Severely Dilated CM/HFrEF s/p ICD, Substance abuse. Atrial Flutter on Eliquis, ETOH use who presented to the ED with complaints of right arm and foot pain with increased shortness of breath due to medication non-compliance and admitted with decompensated heart failure.     Acute Hypoxic Respiratory Failure secondary to Severely Dilated Cardiomyopathy/ HFrEF  - improving on milrinone and bumex drips   - TTE 11/20/2021 moderate TR and EF <20%  - Avoid ACE/ARB due to DERRICK  - Strict I/os, daily weights  - C/w toprol 100 mg daily    - Not candidate for advanced therapies due to smoking/cocaine use history and non-compliance  - Cardio/HF reccs appreciated; patient is signing out AMA given personal issues and states he will return back to the ED later today    Hypervolemic Hyponatremia in the setting of decompensated heart failure - resolved  - Na 134  - Urine lytes reviewed  - Continue diuretics as above  - Free water restriction    - Monitor I/os  - Renal consult appreciated    DERRICK on CKD stage 3  - Likely due to cardiorenal syndrome and use of ARB  - Baseline creatinine 1.5-1.6  - Creatinine uptrending to 1.5 after reinitiation of aldactone; hold bumex and aldactone   - UA bland, urine sodium < 30  - Continue to hold losartan, aldactone, bumex  - Continue sodium bicarb TID   - Avoid nephrotoxic agents and renally dose medications   - Renal recommendations appreciated    Atrial flutter  - Continue amiodarone and eliquis     CAD  - Asymptomatic  - Continue aspirin and statin      DM  - HbA1c 6.3  - ISS  - ADA diet    Neuropathy  - Continue gabapentin    Substance use  - Hx of cocaine use- patient stating has not used for long time   - UDS positive for THC and opioids  - Cessation discussed     Cirrhosis likely ETOH related   - Patient reports cutting down ETOH intake  - Abdominal sono with cirrhotic morphology and moderate ascites  - Monitor LFTs  - Continue diuretics  - Outpatient screening EGD for varices  - Will need hepatology referral as outpatient  - Alcohol cessation  - Refused paracentesis    Elevated TSH  - Normal free T3/T4  - No indication for synthroid    Patient remains acute requiring management of heart failure, but has a personal matter (getting evicted and needs to get all his belongings) that he must attend to and is opting to sign out against medical advice despite knowing the risks of further cardiac decompensation, including arrhthymias and death. Patient states he will return back to the ED later tonight after he moves out all his belongings.

## 2021-12-31 NOTE — DISCHARGE NOTE PROVIDER - CARE PROVIDER_API CALL
Genesis Hewitt)  [facility]; Cardiovascular Disease; Internal Medicine  [address] [place] [address]  Phone: [phone]  Fax: [phone]  Follow Up Time:

## 2021-12-31 NOTE — PROGRESS NOTE ADULT - SUBJECTIVE AND OBJECTIVE BOX
NEPHROLOGY INTERVAL HPI/OVERNIGHT EVENTS:  pt comfortable  no adverse overnight events noted    MEDICATIONS  (STANDING):  aMIOdarone    Tablet 200 milliGRAM(s) Oral daily  apixaban 5 milliGRAM(s) Oral two times a day  aspirin enteric coated 81 milliGRAM(s) Oral daily  atorvastatin 40 milliGRAM(s) Oral at bedtime  buMETAnide Infusion 1 mG/Hr (5 mL/Hr) IV Continuous <Continuous>  dextrose 40% Gel 15 Gram(s) Oral once  dextrose 5%. 1000 milliLiter(s) (50 mL/Hr) IV Continuous <Continuous>  dextrose 5%. 1000 milliLiter(s) (100 mL/Hr) IV Continuous <Continuous>  dextrose 50% Injectable 25 Gram(s) IV Push once  dextrose 50% Injectable 12.5 Gram(s) IV Push once  dextrose 50% Injectable 25 Gram(s) IV Push once  gabapentin 100 milliGRAM(s) Oral three times a day  glucagon  Injectable 1 milliGRAM(s) IntraMuscular once  influenza   Vaccine 0.5 milliLiter(s) IntraMuscular once  insulin lispro (ADMELOG) corrective regimen sliding scale   SubCutaneous three times a day before meals  insulin lispro (ADMELOG) corrective regimen sliding scale   SubCutaneous at bedtime  metoprolol succinate  milliGRAM(s) Oral daily  milrinone Infusion 0.125 MICROgram(s)/kG/Min (2.92 mL/Hr) IV Continuous <Continuous>  spironolactone 25 milliGRAM(s) Oral daily    MEDICATIONS  (PRN):  aluminum hydroxide/magnesium hydroxide/simethicone Suspension 30 milliLiter(s) Oral every 4 hours PRN Dyspepsia  melatonin 3 milliGRAM(s) Oral at bedtime PRN Insomnia  ondansetron Injectable 4 milliGRAM(s) IV Push every 8 hours PRN Nausea and/or Vomiting  traMADol 25 milliGRAM(s) Oral every 6 hours PRN Severe Pain (7 - 10)      Allergies    No Known Allergies          Vital Signs Last 24 Hrs  T(C): 36.3 (31 Dec 2021 04:57), Max: 36.8 (30 Dec 2021 18:01)  T(F): 97.3 (31 Dec 2021 04:57), Max: 98.2 (30 Dec 2021 18:01)  HR: 71 (31 Dec 2021 04:57) (71 - 74)  BP: 107/75 (31 Dec 2021 04:57) (101/74 - 109/68)  BP(mean): --  RR: 17 (31 Dec 2021 04:57) (17 - 18)  SpO2: 96% (31 Dec 2021 04:57) (91% - 96%)    PHYSICAL EXAM:  GENERAL: Comfortable  HEENT:  Moist mucous membranes  NECK: Supple, + JVD  CHEST/LUNG: Diminished BS   HEART: RRR , No rub   ABDOMEN: Distended, nontender +BS  EXTREMITIES:  + pitting edema    LABS:                        14.0   5.45  )-----------( 209      ( 30 Dec 2021 08:22 )             43.9     12    135  |  92<L>  |  42.5<H>  ----------------------------<  97  3.4<L>   |  28.0  |  1.30    Ca    8.7      30 Dec 2021 08:22  Phos  2.8       Mg     2.0         TPro  7.3  /  Alb  4.1  /  TBili  1.3  /  DBili  0.6<H>  /  AST  43<H>  /  ALT  27  /  AlkPhos  253<H>  1230      Urinalysis Basic - ( 29 Dec 2021 13:24 )    Color: Yellow / Appearance: Clear / S.005 / pH: x  Gluc: x / Ketone: Negative  / Bili: Negative / Urobili: Negative mg/dL   Blood: x / Protein: 15 mg/dL / Nitrite: Negative   Leuk Esterase: Negative / RBC: Negative /HPF / WBC 0-2   Sq Epi: x / Non Sq Epi: Occasional / Bacteria: Occasional      Magnesium, Serum: 2.0 mg/dL ( @ 08:22)  Phosphorus Level, Serum: 2.8 mg/dL ( @ 08:22)      RADIOLOGY & ADDITIONAL TESTS:  < from: US Abdomen Complete (US Abdomen Complete .) (21 @ 11:36) >    ACC: [id number] EXAM:  US ABDOMEN COMPLETE                          PROCEDURE DATE:  2021          INTERPRETATION:  CLINICAL INFORMATION: Abdominal distention. Chronic   renal failure    COMPARISON: 2021, ultrasound and CT scan.    TECHNIQUE: Sonography of the abdomen.    FINDINGS:    Liver: Nodular liver suggesting cirrhosis.  Bile ducts: Normal caliber. Common bile duct was not seen.  Gallbladder: Within normal limits.  Pancreas: Not well seen.  Spleen: 11.4 cm. Within normal limits.  Right kidney: 9.7 cm. No hydronephrosis.  Left kidney: 9.1 cm.  No hydronephrosis.  Ascites: Moderate.  Aorta and IVC: Visualized portions are within normal limits.    IMPRESSION: Nodular liver suggests cirrhosis  Moderate amount of ascites    < end of copied text >   NEPHROLOGY INTERVAL HPI/OVERNIGHT EVENTS:  pt comfortable  no adverse overnight events noted    MEDICATIONS  (STANDING):  aMIOdarone    Tablet 200 milliGRAM(s) Oral daily  apixaban 5 milliGRAM(s) Oral two times a day  aspirin enteric coated 81 milliGRAM(s) Oral daily  atorvastatin 40 milliGRAM(s) Oral at bedtime  buMETAnide Infusion 1 mG/Hr (5 mL/Hr) IV Continuous <Continuous>  dextrose 40% Gel 15 Gram(s) Oral once  dextrose 5%. 1000 milliLiter(s) (50 mL/Hr) IV Continuous <Continuous>  dextrose 5%. 1000 milliLiter(s) (100 mL/Hr) IV Continuous <Continuous>  dextrose 50% Injectable 25 Gram(s) IV Push once  dextrose 50% Injectable 12.5 Gram(s) IV Push once  dextrose 50% Injectable 25 Gram(s) IV Push once  gabapentin 100 milliGRAM(s) Oral three times a day  glucagon  Injectable 1 milliGRAM(s) IntraMuscular once  influenza   Vaccine 0.5 milliLiter(s) IntraMuscular once  insulin lispro (ADMELOG) corrective regimen sliding scale   SubCutaneous three times a day before meals  insulin lispro (ADMELOG) corrective regimen sliding scale   SubCutaneous at bedtime  metoprolol succinate  milliGRAM(s) Oral daily  milrinone Infusion 0.125 MICROgram(s)/kG/Min (2.92 mL/Hr) IV Continuous <Continuous>  spironolactone 25 milliGRAM(s) Oral daily    MEDICATIONS  (PRN):  aluminum hydroxide/magnesium hydroxide/simethicone Suspension 30 milliLiter(s) Oral every 4 hours PRN Dyspepsia  melatonin 3 milliGRAM(s) Oral at bedtime PRN Insomnia  ondansetron Injectable 4 milliGRAM(s) IV Push every 8 hours PRN Nausea and/or Vomiting  traMADol 25 milliGRAM(s) Oral every 6 hours PRN Severe Pain (7 - 10)      Allergies    No Known Allergies          Vital Signs Last 24 Hrs  T(C): 36.3 (31 Dec 2021 04:57), Max: 36.8 (30 Dec 2021 18:01)  T(F): 97.3 (31 Dec 2021 04:57), Max: 98.2 (30 Dec 2021 18:01)  HR: 71 (31 Dec 2021 04:57) (71 - 74)  BP: 107/75 (31 Dec 2021 04:57) (101/74 - 109/68)  BP(mean): --  RR: 17 (31 Dec 2021 04:57) (17 - 18)  SpO2: 96% (31 Dec 2021 04:57) (91% - 96%)    PHYSICAL EXAM:  GENERAL: Comfortable  HEENT:  Moist mucous membranes  NECK: Supple, + JVD  CHEST/LUNG: Diminished BS   HEART: RRR , No rub   ABDOMEN: Distended, nontender +BS  EXTREMITIES:  + pitting edema; legs wrapped    LABS:                        14.0   5.45  )-----------( 209      ( 30 Dec 2021 08:22 )             43.9     12    135  |  92<L>  |  42.5<H>  ----------------------------<  97  3.4<L>   |  28.0  |  1.30    Ca    8.7      30 Dec 2021 08:22  Phos  2.8       Mg     2.0         TPro  7.3  /  Alb  4.1  /  TBili  1.3  /  DBili  0.6<H>  /  AST  43<H>  /  ALT  27  /  AlkPhos  253<H>        Urinalysis Basic - ( 29 Dec 2021 13:24 )    Color: Yellow / Appearance: Clear / S.005 / pH: x  Gluc: x / Ketone: Negative  / Bili: Negative / Urobili: Negative mg/dL   Blood: x / Protein: 15 mg/dL / Nitrite: Negative   Leuk Esterase: Negative / RBC: Negative /HPF / WBC 0-2   Sq Epi: x / Non Sq Epi: Occasional / Bacteria: Occasional      Magnesium, Serum: 2.0 mg/dL ( @ 08:22)  Phosphorus Level, Serum: 2.8 mg/dL ( @ 08:22)      RADIOLOGY & ADDITIONAL TESTS:  < from: US Abdomen Complete (US Abdomen Complete .) (21 @ 11:36) >    ACC: [id number] EXAM:  US ABDOMEN COMPLETE                          PROCEDURE DATE:  2021          INTERPRETATION:  CLINICAL INFORMATION: Abdominal distention. Chronic   renal failure    COMPARISON: 2021, ultrasound and CT scan.    TECHNIQUE: Sonography of the abdomen.    FINDINGS:    Liver: Nodular liver suggesting cirrhosis.  Bile ducts: Normal caliber. Common bile duct was not seen.  Gallbladder: Within normal limits.  Pancreas: Not well seen.  Spleen: 11.4 cm. Within normal limits.  Right kidney: 9.7 cm. No hydronephrosis.  Left kidney: 9.1 cm.  No hydronephrosis.  Ascites: Moderate.  Aorta and IVC: Visualized portions are within normal limits.    IMPRESSION: Nodular liver suggests cirrhosis  Moderate amount of ascites    < end of copied text >

## 2021-12-31 NOTE — DISCHARGE NOTE PROVIDER - ATTENDING DISCHARGE PHYSICAL EXAMINATION:
PHYSICAL EXAM:    GENERAL: elderly male, sitting in bed, NAD  HEAD:  Atraumatic, Normocephalic  EYES: EOMI, PERRLA, conjunctiva and sclera clear  ENMT: Moist mucous membranes  NECK: Supple   NERVOUS SYSTEM:  Alert & Oriented X3   CHEST/LUNG: bilateral air entry, coarse breath sounds  HEART: Regular rate and rhythm; +S1/S2  ABDOMEN: Soft, Nontender, distended, ascites improved; Bowel sounds present  EXTREMITIES:  + pedal edema

## 2021-12-31 NOTE — PROGRESS NOTE ADULT - PROBLEM SELECTOR PLAN 2
Cardiorenal component  Improving with diuresis and inotropes  Avoid nephrotoxics. Cardiorenal component  Will plan for diuretics holiday today and start oral diuretics tomorrow  Avoid nephrotoxics.

## 2021-12-31 NOTE — PROGRESS NOTE ADULT - PROBLEM SELECTOR PROBLEM 2
DERRICK (acute kidney injury)

## 2021-12-31 NOTE — PROGRESS NOTE ADULT - ASSESSMENT
DERRICK better  CM/CHF with prerenal azotemia   Normal kidneys on ultrasound   [place] UA  Hypokalemia  - avoid potential nephrotoxins  - adjust diuretics as needed clinically  - cont Aldactone; supplement K+   - inotrope as per cardiology  - trend labs

## 2021-12-31 NOTE — PROGRESS NOTE ADULT - PROVIDER SPECIALTY LIST ADULT
Cardiology
Hospitalist
Nephrology
Heart Failure
Hospitalist
Cardiology
Hospitalist
Hospitalist
Nephrology
Cardiology
Hospitalist
Hospitalist
Nephrology
Heart Failure

## 2021-12-31 NOTE — DISCHARGE NOTE PROVIDER - NSDCCPCAREPLAN_GEN_ALL_CORE_FT
PRINCIPAL DISCHARGE DIAGNOSIS  Diagnosis: Acute pulmonary edema with congestive heart failure  Assessment and Plan of Treatment: signing out AMA  will resume treatment once he returns to the ED later today      SECONDARY DISCHARGE DIAGNOSES  Diagnosis: Hypoxia  Assessment and Plan of Treatment: resolved  signing out AMA    Diagnosis: Acute hyponatremia  Assessment and Plan of Treatment: sodium 134  signing out AMA    Diagnosis: H/O atrial flutter  Assessment and Plan of Treatment: signing out AMA     PRINCIPAL DISCHARGE DIAGNOSIS  Diagnosis: Acute pulmonary edema with congestive heart failure  Assessment and Plan of Treatment: signing out AMA  will resume treatment once he returns to the ED later today      SECONDARY DISCHARGE DIAGNOSES  Diagnosis: Hypoxia  Assessment and Plan of Treatment: resolved  signing out AMA    Diagnosis: Acute hyponatremia  Assessment and Plan of Treatment: sodium 134  signing out AMA    Diagnosis: H/O atrial flutter  Assessment and Plan of Treatment: signing out AMA    Diagnosis: DERRICK (acute kidney injury)  Assessment and Plan of Treatment: creatinine 1.5  diuretics held today  signing out AMA but will return to the ED later

## 2021-12-31 NOTE — PROGRESS NOTE ADULT - TIME BILLING
Acute on chronic HFrEF, Ascites, Acute on CKD, pAflutter
Acute on chronic HFrEF, Hyponatremia, pAflutter, DERRICK on CKD
- Review of notes/records, telemetry, vital signs and daily labs.   - General and cardiovascular physical examination.  - Generation of cardiovascular treatment plan.  - Coordination of care with primary team.
Acute on chronic HFrEF, Hyponatremia, pAflutter, DERRICK on CKD
- Review of records, telemetry, vital signs and daily labs.   - General and cardiovascular physical examination.  - Generation of cardiovascular treatment plan.  - Coordination of care with primary team.
- Review of notes/records, telemetry, vital signs and daily labs.   - General and cardiovascular physical examination.  - Generation of cardiovascular treatment plan.  - Coordination of care with primary team.
- Review of notes/records, telemetry, vital signs and daily labs.   - General and cardiovascular physical examination.  - Generation of cardiovascular treatment plan.  - Coordination of care with primary team.

## 2021-12-31 NOTE — PROGRESS NOTE ADULT - SUBJECTIVE AND OBJECTIVE BOX
Subjective:  INCOMPLETE NOTE    Medications:  aluminum hydroxide/magnesium hydroxide/simethicone Suspension 30 milliLiter(s) Oral every 4 hours PRN  aMIOdarone    Tablet 200 milliGRAM(s) Oral daily  apixaban 5 milliGRAM(s) Oral two times a day  aspirin enteric coated 81 milliGRAM(s) Oral daily  atorvastatin 40 milliGRAM(s) Oral at bedtime  buMETAnide Infusion 1 mG/Hr IV Continuous <Continuous>  dextrose 40% Gel 15 Gram(s) Oral once  dextrose 5%. 1000 milliLiter(s) IV Continuous <Continuous>  dextrose 5%. 1000 milliLiter(s) IV Continuous <Continuous>  dextrose 50% Injectable 25 Gram(s) IV Push once  dextrose 50% Injectable 12.5 Gram(s) IV Push once  dextrose 50% Injectable 25 Gram(s) IV Push once  gabapentin 100 milliGRAM(s) Oral three times a day  glucagon  Injectable 1 milliGRAM(s) IntraMuscular once  influenza   Vaccine 0.5 milliLiter(s) IntraMuscular once  insulin lispro (ADMELOG) corrective regimen sliding scale   SubCutaneous three times a day before meals  insulin lispro (ADMELOG) corrective regimen sliding scale   SubCutaneous at bedtime  melatonin 3 milliGRAM(s) Oral at bedtime PRN  metoprolol succinate  milliGRAM(s) Oral daily  milrinone Infusion 0.125 MICROgram(s)/kG/Min IV Continuous <Continuous>  ondansetron Injectable 4 milliGRAM(s) IV Push every 8 hours PRN  spironolactone 25 milliGRAM(s) Oral daily  traMADol 25 milliGRAM(s) Oral every 6 hours PRN    Vitals:  T(C): 36.3 (12-31-21 @ 04:57), Max: 36.8 (12-30-21 @ 18:01)  HR: 71 (12-31-21 @ 04:57) (71 - 74)  BP: 107/75 (12-31-21 @ 04:57) (101/74 - 109/68)  BP(mean): --  RR: 17 (12-31-21 @ 04:57) (17 - 18)  SpO2: 96% (12-31-21 @ 04:57) (91% - 96%)    Daily     Daily     Weight (kg): 72.6 (12-29 @ 10:28)    I&O's Summary      Physical Exam:  Appearance: No Acute Distress  HEENT: JVP non elevated  Cardiovascular: RRR, Normal S1 S2, No murmurs/rubs/gallops  Respiratory: Clear to auscultation bilaterally  Gastrointestinal: Soft, Non-tender, non-distended	  Skin: no skin lesions  Neurologic: Non-focal  Extremities: No LE edema, warm and well perfused  Psychiatry: A & O x 3, Mood & affect appropriate      Labs:                        14.0   5.45  )-----------( 209      ( 30 Dec 2021 08:22 )             43.9     12-30    135  |  92<L>  |  42.5<H>  ----------------------------<  97  3.4<L>   |  28.0  |  1.30    Ca    8.7      30 Dec 2021 08:22  Phos  2.8     12-30  Mg     2.0     12-30    TPro  7.3  /  Alb  4.1  /  TBili  1.3  /  DBili  0.6<H>  /  AST  43<H>  /  ALT  27  /  AlkPhos  253<H>  12-30            Serum Pro-Brain Natriuretic Peptide: 95816 pg/mL (12-28 @ 05:48)        Lactate, Blood: 1.7 mmol/L (12-30 @ 08:22)     Subjective:  No acute events overnight.       Medications:  aluminum hydroxide/magnesium hydroxide/simethicone Suspension 30 milliLiter(s) Oral every 4 hours PRN  aMIOdarone    Tablet 200 milliGRAM(s) Oral daily  apixaban 5 milliGRAM(s) Oral two times a day  aspirin enteric coated 81 milliGRAM(s) Oral daily  atorvastatin 40 milliGRAM(s) Oral at bedtime  buMETAnide Infusion 1 mG/Hr IV Continuous <Continuous>  dextrose 40% Gel 15 Gram(s) Oral once  dextrose 5%. 1000 milliLiter(s) IV Continuous <Continuous>  dextrose 5%. 1000 milliLiter(s) IV Continuous <Continuous>  dextrose 50% Injectable 25 Gram(s) IV Push once  dextrose 50% Injectable 12.5 Gram(s) IV Push once  dextrose 50% Injectable 25 Gram(s) IV Push once  gabapentin 100 milliGRAM(s) Oral three times a day  glucagon  Injectable 1 milliGRAM(s) IntraMuscular once  influenza   Vaccine 0.5 milliLiter(s) IntraMuscular once  insulin lispro (ADMELOG) corrective regimen sliding scale   SubCutaneous three times a day before meals  insulin lispro (ADMELOG) corrective regimen sliding scale   SubCutaneous at bedtime  melatonin 3 milliGRAM(s) Oral at bedtime PRN  metoprolol succinate  milliGRAM(s) Oral daily  milrinone Infusion 0.125 MICROgram(s)/kG/Min IV Continuous <Continuous>  ondansetron Injectable 4 milliGRAM(s) IV Push every 8 hours PRN  spironolactone 25 milliGRAM(s) Oral daily  traMADol 25 milliGRAM(s) Oral every 6 hours PRN    Vitals:  T(C): 36.3 (12-31-21 @ 04:57), Max: 36.8 (12-30-21 @ 18:01)  HR: 71 (12-31-21 @ 04:57) (71 - 74)  BP: 107/75 (12-31-21 @ 04:57) (101/74 - 109/68)  BP(mean): --  RR: 17 (12-31-21 @ 04:57) (17 - 18)  SpO2: 96% (12-31-21 @ 04:57) (91% - 96%)    Daily     Daily     Weight (kg): 72.6 (12-29 @ 10:28)    I&O's Summary      Physical Exam:  Appearance: No Acute Distress  HEENT: JVP 8cm  Cardiovascular: RRR, Normal S1 S2, No murmurs/rubs/gallops  Respiratory: Clear to auscultation bilaterally  Gastrointestinal: Soft, Non-tender, non-distended	  Skin: no skin lesions  Neurologic: Non-focal  Extremities: +1 LE edema  Psychiatry: A & O x 3, Mood & affect appropriate      Labs:                        14.0   5.45  )-----------( 209      ( 30 Dec 2021 08:22 )             43.9     12-30    135  |  92<L>  |  42.5<H>  ----------------------------<  97  3.4<L>   |  28.0  |  1.30    Ca    8.7      30 Dec 2021 08:22  Phos  2.8     12-30  Mg     2.0     12-30    TPro  7.3  /  Alb  4.1  /  TBili  1.3  /  DBili  0.6<H>  /  AST  43<H>  /  ALT  27  /  AlkPhos  253<H>  12-30            Serum Pro-Brain Natriuretic Peptide: 39235 pg/mL (12-28 @ 05:48)        Lactate, Blood: 1.7 mmol/L (12-30 @ 08:22)

## 2021-12-31 NOTE — CHART NOTE - NSCHARTNOTEFT_GEN_A_CORE
Late entry - patient seen for AMA at 1340    Was called by RN due to patient wanting to sign out AMA. Asked patient why she wanted to leave, reports..........................    Patient is AAO x 3. I explained at length to the patient the risks of signing out AMA including but not limited to harm, injury or death. I explained the risks, benefits and alternatives to treatment as well as the attendant risks of refusing treatment at this time. I offered to answer any questions and fully answered any such questions. We believe that the patient fully understands what has been explained and answered. I informed hospitalist  .........of this, aware. Patient signed form to sign out AMA and accepts responsibility for any and all results of this decision. Late entry - patient seen for AMA at 1440    Was called by RN due to patient wanting to sign out AMA. Asked patient why she wanted to leave, reports he needs to return home to get personal items out of house,  will not let others in to get his effects. He states his intent is to collect his items, put them in storage and then return to the hospital. He understands he needs more treatment for his cardiac condition. He is aware he will have to come back in through the ER once he leaves and will not be brought back to his current room.     Patient is AAO x 3. Dr Chacon and I had explained at length to the patient the risks of signing out AMA including but not limited to harm, injury or death. I offered to answer any questions and fully answered any such questions. We believe that the patient fully understands what has been explained and answered.  Patient signed form to sign out AMA and accepts responsibility for any and all results of this decision.

## 2021-12-31 NOTE — PROGRESS NOTE ADULT - PROBLEM SELECTOR PROBLEM 1
Acute on chronic systolic heart failure, NYHA class 3

## 2022-01-01 ENCOUNTER — TRANSCRIPTION ENCOUNTER (OUTPATIENT)
Age: 62
End: 2022-01-01

## 2022-01-01 ENCOUNTER — INPATIENT (INPATIENT)
Facility: HOSPITAL | Age: 62
LOS: 13 days | Discharge: NURSING, REHAB OR LONG-TERM CARE | DRG: 871 | End: 2022-02-25
Attending: INTERNAL MEDICINE | Admitting: INTERNAL MEDICINE
Payer: MEDICAID

## 2022-01-01 ENCOUNTER — APPOINTMENT (OUTPATIENT)
Dept: ULTRASOUND IMAGING | Facility: CLINIC | Age: 62
End: 2022-01-01

## 2022-01-01 ENCOUNTER — APPOINTMENT (OUTPATIENT)
Dept: MAMMOGRAPHY | Facility: CLINIC | Age: 62
End: 2022-01-01

## 2022-01-01 ENCOUNTER — INPATIENT (INPATIENT)
Facility: HOSPITAL | Age: 62
LOS: 24 days | DRG: 207 | End: 2023-01-17
Attending: INTERNAL MEDICINE | Admitting: INTERNAL MEDICINE
Payer: MEDICAID

## 2022-01-01 VITALS
SYSTOLIC BLOOD PRESSURE: 114 MMHG | OXYGEN SATURATION: 97 % | HEART RATE: 76 BPM | DIASTOLIC BLOOD PRESSURE: 70 MMHG | TEMPERATURE: 98 F | RESPIRATION RATE: 16 BRPM

## 2022-01-01 VITALS
RESPIRATION RATE: 47 BRPM | OXYGEN SATURATION: 87 % | SYSTOLIC BLOOD PRESSURE: 146 MMHG | HEIGHT: 69 IN | WEIGHT: 179.9 LBS | HEART RATE: 107 BPM | DIASTOLIC BLOOD PRESSURE: 102 MMHG | TEMPERATURE: 100 F

## 2022-01-01 VITALS — DIASTOLIC BLOOD PRESSURE: 71 MMHG | SYSTOLIC BLOOD PRESSURE: 99 MMHG | HEART RATE: 54 BPM

## 2022-01-01 VITALS
OXYGEN SATURATION: 100 % | TEMPERATURE: 100 F | WEIGHT: 160.06 LBS | SYSTOLIC BLOOD PRESSURE: 118 MMHG | DIASTOLIC BLOOD PRESSURE: 85 MMHG | HEART RATE: 101 BPM | RESPIRATION RATE: 20 BRPM | HEIGHT: 72 IN

## 2022-01-01 DIAGNOSIS — Z95.810 PRESENCE OF AUTOMATIC (IMPLANTABLE) CARDIAC DEFIBRILLATOR: Chronic | ICD-10-CM

## 2022-01-01 DIAGNOSIS — I50.22 CHRONIC SYSTOLIC (CONGESTIVE) HEART FAILURE: ICD-10-CM

## 2022-01-01 DIAGNOSIS — U07.1 COVID-19: ICD-10-CM

## 2022-01-01 DIAGNOSIS — J96.01 ACUTE RESPIRATORY FAILURE WITH HYPOXIA: ICD-10-CM

## 2022-01-01 DIAGNOSIS — R50.9 FEVER, UNSPECIFIED: ICD-10-CM

## 2022-01-01 DIAGNOSIS — Z29.9 ENCOUNTER FOR PROPHYLACTIC MEASURES, UNSPECIFIED: ICD-10-CM

## 2022-01-01 DIAGNOSIS — E87.1 HYPO-OSMOLALITY AND HYPONATREMIA: ICD-10-CM

## 2022-01-01 DIAGNOSIS — R18.8 OTHER ASCITES: ICD-10-CM

## 2022-01-01 DIAGNOSIS — N17.9 ACUTE KIDNEY FAILURE, UNSPECIFIED: ICD-10-CM

## 2022-01-01 DIAGNOSIS — R09.89 OTHER SPECIFIED SYMPTOMS AND SIGNS INVOLVING THE CIRCULATORY AND RESPIRATORY SYSTEMS: Chronic | ICD-10-CM

## 2022-01-01 DIAGNOSIS — I50.9 HEART FAILURE, UNSPECIFIED: ICD-10-CM

## 2022-01-01 DIAGNOSIS — A41.9 SEPSIS, UNSPECIFIED ORGANISM: ICD-10-CM

## 2022-01-01 DIAGNOSIS — R78.81 BACTEREMIA: ICD-10-CM

## 2022-01-01 DIAGNOSIS — I50.23 ACUTE ON CHRONIC SYSTOLIC (CONGESTIVE) HEART FAILURE: ICD-10-CM

## 2022-01-01 DIAGNOSIS — I48.92 UNSPECIFIED ATRIAL FLUTTER: ICD-10-CM

## 2022-01-01 LAB
-  AMIKACIN: SIGNIFICANT CHANGE UP
-  AMIKACIN: SIGNIFICANT CHANGE UP
-  AMOXICILLIN/CLAVULANIC ACID: SIGNIFICANT CHANGE UP
-  AMPICILLIN/SULBACTAM: SIGNIFICANT CHANGE UP
-  AMPICILLIN/SULBACTAM: SIGNIFICANT CHANGE UP
-  AMPICILLIN: SIGNIFICANT CHANGE UP
-  AZTREONAM: SIGNIFICANT CHANGE UP
-  AZTREONAM: SIGNIFICANT CHANGE UP
-  CEFAZOLIN: SIGNIFICANT CHANGE UP
-  CEFAZOLIN: SIGNIFICANT CHANGE UP
-  CEFEPIME: SIGNIFICANT CHANGE UP
-  CEFEPIME: SIGNIFICANT CHANGE UP
-  CEFOXITIN: SIGNIFICANT CHANGE UP
-  CEFTAZIDIME: SIGNIFICANT CHANGE UP
-  CEFTRIAXONE: SIGNIFICANT CHANGE UP
-  CIPROFLOXACIN: SIGNIFICANT CHANGE UP
-  CIPROFLOXACIN: SIGNIFICANT CHANGE UP
-  CLINDAMYCIN: SIGNIFICANT CHANGE UP
-  ERTAPENEM: SIGNIFICANT CHANGE UP
-  ERYTHROMYCIN: SIGNIFICANT CHANGE UP
-  GENTAMICIN: SIGNIFICANT CHANGE UP
-  IMIPENEM: SIGNIFICANT CHANGE UP
-  IMIPENEM: SIGNIFICANT CHANGE UP
-  LEVOFLOXACIN: SIGNIFICANT CHANGE UP
-  LEVOFLOXACIN: SIGNIFICANT CHANGE UP
-  MEROPENEM: SIGNIFICANT CHANGE UP
-  MEROPENEM: SIGNIFICANT CHANGE UP
-  NITROFURANTOIN: SIGNIFICANT CHANGE UP
-  OXACILLIN: SIGNIFICANT CHANGE UP
-  PENICILLIN: SIGNIFICANT CHANGE UP
-  PIPERACILLIN/TAZOBACTAM: SIGNIFICANT CHANGE UP
-  PIPERACILLIN/TAZOBACTAM: SIGNIFICANT CHANGE UP
-  RIFAMPIN: SIGNIFICANT CHANGE UP
-  TETRACYCLINE: SIGNIFICANT CHANGE UP
-  TOBRAMYCIN: SIGNIFICANT CHANGE UP
-  TOBRAMYCIN: SIGNIFICANT CHANGE UP
-  TRIMETHOPRIM/SULFAMETHOXAZOLE: SIGNIFICANT CHANGE UP
-  TRIMETHOPRIM/SULFAMETHOXAZOLE: SIGNIFICANT CHANGE UP
-  VANCOMYCIN: SIGNIFICANT CHANGE UP
A1C WITH ESTIMATED AVERAGE GLUCOSE RESULT: 6.4 % — HIGH (ref 4–5.6)
A1C WITH ESTIMATED AVERAGE GLUCOSE RESULT: 7.4 % — HIGH (ref 4–5.6)
ALBUMIN SERPL ELPH-MCNC: 1.9 G/DL — LOW (ref 3.3–5)
ALBUMIN SERPL ELPH-MCNC: 2.1 G/DL — LOW (ref 3.3–5)
ALBUMIN SERPL ELPH-MCNC: 2.3 G/DL — LOW (ref 3.3–5)
ALBUMIN SERPL ELPH-MCNC: 2.4 G/DL — LOW (ref 3.3–5)
ALBUMIN SERPL ELPH-MCNC: 2.5 G/DL — LOW (ref 3.3–5)
ALBUMIN SERPL ELPH-MCNC: 2.7 G/DL — LOW (ref 3.3–5)
ALBUMIN SERPL ELPH-MCNC: 2.9 G/DL — LOW (ref 3.3–5)
ALBUMIN SERPL ELPH-MCNC: 3.2 G/DL — LOW (ref 3.3–5)
ALBUMIN SERPL ELPH-MCNC: 3.7 G/DL — SIGNIFICANT CHANGE UP (ref 3.3–5)
ALBUMIN SERPL ELPH-MCNC: 3.9 G/DL — SIGNIFICANT CHANGE UP (ref 3.3–5)
ALBUMIN SERPL ELPH-MCNC: 4.7 G/DL — SIGNIFICANT CHANGE UP (ref 3.3–5)
ALP SERPL-CCNC: 250 U/L — HIGH (ref 30–120)
ALP SERPL-CCNC: 287 U/L — HIGH (ref 30–120)
ALP SERPL-CCNC: 293 U/L — HIGH (ref 40–120)
ALP SERPL-CCNC: 295 U/L — HIGH (ref 30–120)
ALP SERPL-CCNC: 306 U/L — HIGH (ref 30–120)
ALP SERPL-CCNC: 312 U/L — HIGH (ref 30–120)
ALP SERPL-CCNC: 326 U/L — HIGH (ref 30–120)
ALP SERPL-CCNC: 363 U/L — HIGH (ref 40–120)
ALP SERPL-CCNC: 390 U/L — HIGH (ref 30–120)
ALP SERPL-CCNC: 414 U/L — HIGH (ref 40–120)
ALP SERPL-CCNC: 434 U/L — HIGH (ref 30–120)
ALP SERPL-CCNC: 458 U/L — HIGH (ref 30–120)
ALP SERPL-CCNC: 552 U/L — HIGH (ref 40–120)
ALT FLD-CCNC: 1041 U/L DA — HIGH (ref 10–60)
ALT FLD-CCNC: 122 U/L DA — HIGH (ref 10–60)
ALT FLD-CCNC: 1673 U/L DA — HIGH (ref 10–60)
ALT FLD-CCNC: 1824 U/L DA — HIGH (ref 10–60)
ALT FLD-CCNC: 20 U/L — SIGNIFICANT CHANGE UP (ref 12–78)
ALT FLD-CCNC: 2066 U/L DA — HIGH (ref 10–60)
ALT FLD-CCNC: 22 U/L — SIGNIFICANT CHANGE UP (ref 12–78)
ALT FLD-CCNC: 236 U/L DA — HIGH (ref 10–60)
ALT FLD-CCNC: 2405 U/L DA — HIGH (ref 10–60)
ALT FLD-CCNC: 411 U/L DA — HIGH (ref 10–60)
ALT FLD-CCNC: 42 U/L — SIGNIFICANT CHANGE UP (ref 12–78)
ALT FLD-CCNC: 53 U/L — SIGNIFICANT CHANGE UP (ref 12–78)
ALT FLD-CCNC: 735 U/L DA — HIGH (ref 10–60)
AMMONIA BLD-MCNC: 43 UMOL/L — HIGH (ref 11–32)
AMPHET UR-MCNC: NEGATIVE — SIGNIFICANT CHANGE UP
ANION GAP SERPL CALC-SCNC: 11 MMOL/L — SIGNIFICANT CHANGE UP (ref 5–17)
ANION GAP SERPL CALC-SCNC: 12 MMOL/L — SIGNIFICANT CHANGE UP (ref 5–17)
ANION GAP SERPL CALC-SCNC: 14 MMOL/L — SIGNIFICANT CHANGE UP (ref 5–17)
ANION GAP SERPL CALC-SCNC: 16 MMOL/L — SIGNIFICANT CHANGE UP (ref 5–17)
ANION GAP SERPL CALC-SCNC: 17 MMOL/L — SIGNIFICANT CHANGE UP (ref 5–17)
ANION GAP SERPL CALC-SCNC: 19 MMOL/L — HIGH (ref 5–17)
ANION GAP SERPL CALC-SCNC: 20 MMOL/L — HIGH (ref 5–17)
ANION GAP SERPL CALC-SCNC: 3 MMOL/L — LOW (ref 5–17)
ANION GAP SERPL CALC-SCNC: 4 MMOL/L — LOW (ref 5–17)
ANION GAP SERPL CALC-SCNC: 5 MMOL/L — SIGNIFICANT CHANGE UP (ref 5–17)
ANION GAP SERPL CALC-SCNC: 6 MMOL/L — SIGNIFICANT CHANGE UP (ref 5–17)
ANION GAP SERPL CALC-SCNC: 6 MMOL/L — SIGNIFICANT CHANGE UP (ref 5–17)
ANION GAP SERPL CALC-SCNC: 7 MMOL/L — SIGNIFICANT CHANGE UP (ref 5–17)
ANION GAP SERPL CALC-SCNC: 8 MMOL/L — SIGNIFICANT CHANGE UP (ref 5–17)
ANION GAP SERPL CALC-SCNC: 9 MMOL/L — SIGNIFICANT CHANGE UP (ref 5–17)
ANION GAP SERPL CALC-SCNC: 9 MMOL/L — SIGNIFICANT CHANGE UP (ref 5–17)
APPEARANCE UR: ABNORMAL
APPEARANCE UR: CLEAR — SIGNIFICANT CHANGE UP
APTT BLD: 34.9 SEC — SIGNIFICANT CHANGE UP (ref 27.5–35.5)
APTT BLD: 38.7 SEC — HIGH (ref 27.5–35.5)
AST SERPL-CCNC: 117 U/L — HIGH (ref 10–40)
AST SERPL-CCNC: 1766 U/L — HIGH (ref 10–40)
AST SERPL-CCNC: 212 U/L — HIGH (ref 10–40)
AST SERPL-CCNC: 231 U/L — HIGH (ref 10–40)
AST SERPL-CCNC: 2349 U/L — HIGH (ref 10–40)
AST SERPL-CCNC: 2524 U/L — HIGH (ref 10–40)
AST SERPL-CCNC: 34 U/L — SIGNIFICANT CHANGE UP (ref 15–37)
AST SERPL-CCNC: 53 U/L — HIGH (ref 15–37)
AST SERPL-CCNC: 55 U/L — HIGH (ref 10–40)
AST SERPL-CCNC: 55 U/L — HIGH (ref 15–37)
AST SERPL-CCNC: 670 U/L — HIGH (ref 10–40)
AST SERPL-CCNC: 72 U/L — HIGH (ref 15–37)
AST SERPL-CCNC: 80 U/L — HIGH (ref 10–40)
BARBITURATES UR SCN-MCNC: NEGATIVE — SIGNIFICANT CHANGE UP
BASE EXCESS BLDA CALC-SCNC: -3.8 MMOL/L — LOW (ref -2–3)
BASE EXCESS BLDA CALC-SCNC: -4.2 MMOL/L — LOW (ref -2–3)
BASE EXCESS BLDA CALC-SCNC: 0.4 MMOL/L — SIGNIFICANT CHANGE UP (ref -2–3)
BASE EXCESS BLDA CALC-SCNC: 4.9 MMOL/L — HIGH (ref -2–3)
BASE EXCESS BLDA CALC-SCNC: 5.4 MMOL/L — HIGH (ref -2–3)
BASE EXCESS BLDA CALC-SCNC: 9.9 MMOL/L — HIGH (ref -2–3)
BASOPHILS # BLD AUTO: 0 K/UL — SIGNIFICANT CHANGE UP (ref 0–0.2)
BASOPHILS # BLD AUTO: 0.01 K/UL — SIGNIFICANT CHANGE UP (ref 0–0.2)
BASOPHILS # BLD AUTO: 0.02 K/UL — SIGNIFICANT CHANGE UP (ref 0–0.2)
BASOPHILS # BLD AUTO: 0.03 K/UL — SIGNIFICANT CHANGE UP (ref 0–0.2)
BASOPHILS # BLD AUTO: 0.04 K/UL — SIGNIFICANT CHANGE UP (ref 0–0.2)
BASOPHILS # BLD AUTO: 0.08 K/UL — SIGNIFICANT CHANGE UP (ref 0–0.2)
BASOPHILS NFR BLD AUTO: 0 % — SIGNIFICANT CHANGE UP (ref 0–2)
BASOPHILS NFR BLD AUTO: 0.1 % — SIGNIFICANT CHANGE UP (ref 0–2)
BASOPHILS NFR BLD AUTO: 0.1 % — SIGNIFICANT CHANGE UP (ref 0–2)
BASOPHILS NFR BLD AUTO: 0.2 % — SIGNIFICANT CHANGE UP (ref 0–2)
BASOPHILS NFR BLD AUTO: 0.4 % — SIGNIFICANT CHANGE UP (ref 0–2)
BASOPHILS NFR BLD AUTO: 0.8 % — SIGNIFICANT CHANGE UP (ref 0–2)
BENZODIAZ UR-MCNC: NEGATIVE — SIGNIFICANT CHANGE UP
BILIRUB DIRECT SERPL-MCNC: 1.6 MG/DL — HIGH (ref 0–0.3)
BILIRUB DIRECT SERPL-MCNC: 1.6 MG/DL — HIGH (ref 0–0.3)
BILIRUB INDIRECT FLD-MCNC: 0.7 MG/DL — SIGNIFICANT CHANGE UP (ref 0.2–1)
BILIRUB INDIRECT FLD-MCNC: 0.9 MG/DL — SIGNIFICANT CHANGE UP (ref 0.2–1)
BILIRUB SERPL-MCNC: 0.8 MG/DL — SIGNIFICANT CHANGE UP (ref 0.2–1.2)
BILIRUB SERPL-MCNC: 1.1 MG/DL — SIGNIFICANT CHANGE UP (ref 0.2–1.2)
BILIRUB SERPL-MCNC: 1.2 MG/DL — SIGNIFICANT CHANGE UP (ref 0.2–1.2)
BILIRUB SERPL-MCNC: 1.4 MG/DL — HIGH (ref 0.2–1.2)
BILIRUB SERPL-MCNC: 1.5 MG/DL — HIGH (ref 0.2–1.2)
BILIRUB SERPL-MCNC: 1.8 MG/DL — HIGH (ref 0.2–1.2)
BILIRUB SERPL-MCNC: 2.1 MG/DL — HIGH (ref 0.2–1.2)
BILIRUB SERPL-MCNC: 2.2 MG/DL — HIGH (ref 0.2–1.2)
BILIRUB SERPL-MCNC: 2.3 MG/DL — HIGH (ref 0.2–1.2)
BILIRUB SERPL-MCNC: 2.5 MG/DL — HIGH (ref 0.2–1.2)
BILIRUB SERPL-MCNC: 2.7 MG/DL — HIGH (ref 0.2–1.2)
BILIRUB SERPL-MCNC: 2.8 MG/DL — HIGH (ref 0.2–1.2)
BILIRUB SERPL-MCNC: 2.9 MG/DL — HIGH (ref 0.2–1.2)
BILIRUB UR-MCNC: NEGATIVE — SIGNIFICANT CHANGE UP
BILIRUB UR-MCNC: NEGATIVE — SIGNIFICANT CHANGE UP
BLOOD GAS COMMENTS ARTERIAL: SIGNIFICANT CHANGE UP
BUN SERPL-MCNC: 114 MG/DL — HIGH (ref 7–23)
BUN SERPL-MCNC: 115 MG/DL — HIGH (ref 7–23)
BUN SERPL-MCNC: 119 MG/DL — HIGH (ref 7–23)
BUN SERPL-MCNC: 123 MG/DL — HIGH (ref 7–23)
BUN SERPL-MCNC: 124 MG/DL — HIGH (ref 7–23)
BUN SERPL-MCNC: 127 MG/DL — HIGH (ref 7–23)
BUN SERPL-MCNC: 32 MG/DL — HIGH (ref 7–23)
BUN SERPL-MCNC: 39 MG/DL — HIGH (ref 7–23)
BUN SERPL-MCNC: 51 MG/DL — HIGH (ref 7–23)
BUN SERPL-MCNC: 54 MG/DL — HIGH (ref 7–23)
BUN SERPL-MCNC: 55 MG/DL — HIGH (ref 7–23)
BUN SERPL-MCNC: 55 MG/DL — HIGH (ref 8–20)
BUN SERPL-MCNC: 58 MG/DL — HIGH (ref 7–23)
BUN SERPL-MCNC: 58 MG/DL — HIGH (ref 7–23)
BUN SERPL-MCNC: 59 MG/DL — HIGH (ref 7–23)
BUN SERPL-MCNC: 62 MG/DL — HIGH (ref 7–23)
BUN SERPL-MCNC: 65 MG/DL — HIGH (ref 7–23)
BUN SERPL-MCNC: 67 MG/DL — HIGH (ref 7–23)
BUN SERPL-MCNC: 68 MG/DL — HIGH (ref 7–23)
BUN SERPL-MCNC: 71 MG/DL — HIGH (ref 7–23)
BUN SERPL-MCNC: 76 MG/DL — HIGH (ref 7–23)
BUN SERPL-MCNC: 94 MG/DL — HIGH (ref 7–23)
BUN SERPL-MCNC: 99.1 MG/DL — HIGH (ref 8–20)
CALCIUM SERPL-MCNC: 7.8 MG/DL — LOW (ref 8.5–10.1)
CALCIUM SERPL-MCNC: 7.9 MG/DL — LOW (ref 8.5–10.1)
CALCIUM SERPL-MCNC: 7.9 MG/DL — LOW (ref 8.5–10.1)
CALCIUM SERPL-MCNC: 8 MG/DL — LOW (ref 8.5–10.1)
CALCIUM SERPL-MCNC: 8.2 MG/DL — LOW (ref 8.5–10.1)
CALCIUM SERPL-MCNC: 8.3 MG/DL — LOW (ref 8.4–10.5)
CALCIUM SERPL-MCNC: 8.3 MG/DL — LOW (ref 8.4–10.5)
CALCIUM SERPL-MCNC: 8.3 MG/DL — LOW (ref 8.6–10.2)
CALCIUM SERPL-MCNC: 8.4 MG/DL — LOW (ref 8.5–10.1)
CALCIUM SERPL-MCNC: 8.4 MG/DL — SIGNIFICANT CHANGE UP (ref 8.4–10.5)
CALCIUM SERPL-MCNC: 8.6 MG/DL — SIGNIFICANT CHANGE UP (ref 8.5–10.1)
CALCIUM SERPL-MCNC: 8.7 MG/DL — SIGNIFICANT CHANGE UP (ref 8.4–10.5)
CALCIUM SERPL-MCNC: 8.7 MG/DL — SIGNIFICANT CHANGE UP (ref 8.4–10.5)
CALCIUM SERPL-MCNC: 8.7 MG/DL — SIGNIFICANT CHANGE UP (ref 8.5–10.1)
CALCIUM SERPL-MCNC: 8.8 MG/DL — SIGNIFICANT CHANGE UP (ref 8.4–10.5)
CALCIUM SERPL-MCNC: 8.8 MG/DL — SIGNIFICANT CHANGE UP (ref 8.4–10.5)
CALCIUM SERPL-MCNC: 8.8 MG/DL — SIGNIFICANT CHANGE UP (ref 8.5–10.1)
CALCIUM SERPL-MCNC: 8.9 MG/DL — SIGNIFICANT CHANGE UP (ref 8.4–10.5)
CALCIUM SERPL-MCNC: 9 MG/DL — SIGNIFICANT CHANGE UP (ref 8.5–10.1)
CALCIUM SERPL-MCNC: 9 MG/DL — SIGNIFICANT CHANGE UP (ref 8.5–10.1)
CALCIUM SERPL-MCNC: 9 MG/DL — SIGNIFICANT CHANGE UP (ref 8.6–10.2)
CALCIUM SERPL-MCNC: 9.1 MG/DL — SIGNIFICANT CHANGE UP (ref 8.4–10.5)
CALCIUM SERPL-MCNC: 9.7 MG/DL — SIGNIFICANT CHANGE UP (ref 8.4–10.5)
CHLORIDE SERPL-SCNC: 100 MMOL/L — SIGNIFICANT CHANGE UP (ref 96–108)
CHLORIDE SERPL-SCNC: 100 MMOL/L — SIGNIFICANT CHANGE UP (ref 96–108)
CHLORIDE SERPL-SCNC: 101 MMOL/L — SIGNIFICANT CHANGE UP (ref 96–108)
CHLORIDE SERPL-SCNC: 103 MMOL/L — SIGNIFICANT CHANGE UP (ref 96–108)
CHLORIDE SERPL-SCNC: 104 MMOL/L — SIGNIFICANT CHANGE UP (ref 96–108)
CHLORIDE SERPL-SCNC: 104 MMOL/L — SIGNIFICANT CHANGE UP (ref 96–108)
CHLORIDE SERPL-SCNC: 105 MMOL/L — SIGNIFICANT CHANGE UP (ref 96–108)
CHLORIDE SERPL-SCNC: 105 MMOL/L — SIGNIFICANT CHANGE UP (ref 96–108)
CHLORIDE SERPL-SCNC: 106 MMOL/L — SIGNIFICANT CHANGE UP (ref 96–108)
CHLORIDE SERPL-SCNC: 107 MMOL/L — SIGNIFICANT CHANGE UP (ref 96–108)
CHLORIDE SERPL-SCNC: 108 MMOL/L — SIGNIFICANT CHANGE UP (ref 96–108)
CHLORIDE SERPL-SCNC: 109 MMOL/L — HIGH (ref 96–108)
CHLORIDE SERPL-SCNC: 109 MMOL/L — HIGH (ref 96–108)
CHLORIDE SERPL-SCNC: 85 MMOL/L — LOW (ref 98–107)
CHLORIDE SERPL-SCNC: 95 MMOL/L — LOW (ref 98–107)
CHLORIDE SERPL-SCNC: 97 MMOL/L — SIGNIFICANT CHANGE UP (ref 96–108)
CHLORIDE SERPL-SCNC: 97 MMOL/L — SIGNIFICANT CHANGE UP (ref 96–108)
CHLORIDE SERPL-SCNC: 98 MMOL/L — SIGNIFICANT CHANGE UP (ref 96–108)
CHLORIDE SERPL-SCNC: 99 MMOL/L — SIGNIFICANT CHANGE UP (ref 96–108)
CHLORIDE SERPL-SCNC: 99 MMOL/L — SIGNIFICANT CHANGE UP (ref 96–108)
CHOLEST SERPL-MCNC: 139 MG/DL — SIGNIFICANT CHANGE UP
CK MB BLD-MCNC: 1.2 % — SIGNIFICANT CHANGE UP (ref 0–3.5)
CK MB CFR SERPL CALC: 0.5 NG/ML — SIGNIFICANT CHANGE UP (ref 0–3.6)
CK SERPL-CCNC: 40 U/L — SIGNIFICANT CHANGE UP (ref 39–308)
CO2 SERPL-SCNC: 21 MMOL/L — LOW (ref 22–31)
CO2 SERPL-SCNC: 22 MMOL/L — SIGNIFICANT CHANGE UP (ref 22–29)
CO2 SERPL-SCNC: 22 MMOL/L — SIGNIFICANT CHANGE UP (ref 22–31)
CO2 SERPL-SCNC: 24 MMOL/L — SIGNIFICANT CHANGE UP (ref 22–31)
CO2 SERPL-SCNC: 24 MMOL/L — SIGNIFICANT CHANGE UP (ref 22–31)
CO2 SERPL-SCNC: 25 MMOL/L — SIGNIFICANT CHANGE UP (ref 22–29)
CO2 SERPL-SCNC: 26 MMOL/L — SIGNIFICANT CHANGE UP (ref 22–31)
CO2 SERPL-SCNC: 26 MMOL/L — SIGNIFICANT CHANGE UP (ref 22–31)
CO2 SERPL-SCNC: 27 MMOL/L — SIGNIFICANT CHANGE UP (ref 22–31)
CO2 SERPL-SCNC: 28 MMOL/L — SIGNIFICANT CHANGE UP (ref 22–31)
CO2 SERPL-SCNC: 29 MMOL/L — SIGNIFICANT CHANGE UP (ref 22–31)
CO2 SERPL-SCNC: 29 MMOL/L — SIGNIFICANT CHANGE UP (ref 22–31)
CO2 SERPL-SCNC: 30 MMOL/L — SIGNIFICANT CHANGE UP (ref 22–31)
CO2 SERPL-SCNC: 31 MMOL/L — SIGNIFICANT CHANGE UP (ref 22–31)
CO2 SERPL-SCNC: 32 MMOL/L — HIGH (ref 22–31)
CO2 SERPL-SCNC: 32 MMOL/L — HIGH (ref 22–31)
CO2 SERPL-SCNC: 33 MMOL/L — HIGH (ref 22–31)
CO2 SERPL-SCNC: 34 MMOL/L — HIGH (ref 22–31)
COCAINE METAB.OTHER UR-MCNC: NEGATIVE — SIGNIFICANT CHANGE UP
COLOR SPEC: YELLOW — SIGNIFICANT CHANGE UP
COLOR SPEC: YELLOW — SIGNIFICANT CHANGE UP
CREAT SERPL-MCNC: 1.1 MG/DL — SIGNIFICANT CHANGE UP (ref 0.5–1.3)
CREAT SERPL-MCNC: 1.2 MG/DL — SIGNIFICANT CHANGE UP (ref 0.5–1.3)
CREAT SERPL-MCNC: 1.4 MG/DL — HIGH (ref 0.5–1.3)
CREAT SERPL-MCNC: 1.4 MG/DL — HIGH (ref 0.5–1.3)
CREAT SERPL-MCNC: 1.5 MG/DL — HIGH (ref 0.5–1.3)
CREAT SERPL-MCNC: 1.6 MG/DL — HIGH (ref 0.5–1.3)
CREAT SERPL-MCNC: 1.6 MG/DL — HIGH (ref 0.5–1.3)
CREAT SERPL-MCNC: 1.62 MG/DL — HIGH (ref 0.5–1.3)
CREAT SERPL-MCNC: 1.7 MG/DL — HIGH (ref 0.5–1.3)
CREAT SERPL-MCNC: 1.72 MG/DL — HIGH (ref 0.5–1.3)
CREAT SERPL-MCNC: 2.65 MG/DL — HIGH (ref 0.5–1.3)
CREAT SERPL-MCNC: 3.19 MG/DL — HIGH (ref 0.5–1.3)
CREAT SERPL-MCNC: 3.85 MG/DL — HIGH (ref 0.5–1.3)
CREAT SERPL-MCNC: 3.88 MG/DL — HIGH (ref 0.5–1.3)
CREAT SERPL-MCNC: 3.93 MG/DL — HIGH (ref 0.5–1.3)
CREAT SERPL-MCNC: 4.04 MG/DL — HIGH (ref 0.5–1.3)
CREAT SERPL-MCNC: 4.04 MG/DL — HIGH (ref 0.5–1.3)
CREAT SERPL-MCNC: 4.2 MG/DL — HIGH (ref 0.5–1.3)
CREAT SERPL-MCNC: 4.58 MG/DL — HIGH (ref 0.5–1.3)
CULTURE RESULTS: NO GROWTH — SIGNIFICANT CHANGE UP
CULTURE RESULTS: SIGNIFICANT CHANGE UP
DIFF PNL FLD: ABNORMAL
DIFF PNL FLD: NEGATIVE — SIGNIFICANT CHANGE UP
EGFR: 14 ML/MIN/1.73M2 — LOW
EGFR: 15 ML/MIN/1.73M2 — LOW
EGFR: 16 ML/MIN/1.73M2 — LOW
EGFR: 16 ML/MIN/1.73M2 — LOW
EGFR: 17 ML/MIN/1.73M2 — LOW
EGFR: 21 ML/MIN/1.73M2 — LOW
EGFR: 27 ML/MIN/1.73M2 — LOW
EGFR: 46 ML/MIN/1.73M2 — LOW
EOSINOPHIL # BLD AUTO: 0 K/UL — SIGNIFICANT CHANGE UP (ref 0–0.5)
EOSINOPHIL # BLD AUTO: 0.02 K/UL — SIGNIFICANT CHANGE UP (ref 0–0.5)
EOSINOPHIL # BLD AUTO: 0.18 K/UL — SIGNIFICANT CHANGE UP (ref 0–0.5)
EOSINOPHIL # BLD AUTO: 0.31 K/UL — SIGNIFICANT CHANGE UP (ref 0–0.5)
EOSINOPHIL NFR BLD AUTO: 0 % — SIGNIFICANT CHANGE UP (ref 0–6)
EOSINOPHIL NFR BLD AUTO: 0.3 % — SIGNIFICANT CHANGE UP (ref 0–6)
EOSINOPHIL NFR BLD AUTO: 1.9 % — SIGNIFICANT CHANGE UP (ref 0–6)
EOSINOPHIL NFR BLD AUTO: 2.8 % — SIGNIFICANT CHANGE UP (ref 0–6)
ESTIMATED AVERAGE GLUCOSE: 137 MG/DL — HIGH (ref 68–114)
ESTIMATED AVERAGE GLUCOSE: 166 MG/DL — HIGH (ref 68–114)
FLUAV AG NPH QL: SIGNIFICANT CHANGE UP
FLUAV AG NPH QL: SIGNIFICANT CHANGE UP
FLUBV AG NPH QL: SIGNIFICANT CHANGE UP
FLUBV AG NPH QL: SIGNIFICANT CHANGE UP
GAS PNL BLDA: SIGNIFICANT CHANGE UP
GAS PNL BLDA: SIGNIFICANT CHANGE UP
GLUCOSE BLDC GLUCOMTR-MCNC: 101 MG/DL — HIGH (ref 70–99)
GLUCOSE BLDC GLUCOMTR-MCNC: 117 MG/DL — HIGH (ref 70–99)
GLUCOSE BLDC GLUCOMTR-MCNC: 117 MG/DL — HIGH (ref 70–99)
GLUCOSE BLDC GLUCOMTR-MCNC: 118 MG/DL — HIGH (ref 70–99)
GLUCOSE BLDC GLUCOMTR-MCNC: 121 MG/DL — HIGH (ref 70–99)
GLUCOSE BLDC GLUCOMTR-MCNC: 124 MG/DL — HIGH (ref 70–99)
GLUCOSE BLDC GLUCOMTR-MCNC: 126 MG/DL — HIGH (ref 70–99)
GLUCOSE BLDC GLUCOMTR-MCNC: 127 MG/DL — HIGH (ref 70–99)
GLUCOSE BLDC GLUCOMTR-MCNC: 127 MG/DL — HIGH (ref 70–99)
GLUCOSE BLDC GLUCOMTR-MCNC: 130 MG/DL — HIGH (ref 70–99)
GLUCOSE BLDC GLUCOMTR-MCNC: 131 MG/DL — HIGH (ref 70–99)
GLUCOSE BLDC GLUCOMTR-MCNC: 131 MG/DL — HIGH (ref 70–99)
GLUCOSE BLDC GLUCOMTR-MCNC: 132 MG/DL — HIGH (ref 70–99)
GLUCOSE BLDC GLUCOMTR-MCNC: 135 MG/DL — HIGH (ref 70–99)
GLUCOSE BLDC GLUCOMTR-MCNC: 138 MG/DL — HIGH (ref 70–99)
GLUCOSE BLDC GLUCOMTR-MCNC: 138 MG/DL — HIGH (ref 70–99)
GLUCOSE BLDC GLUCOMTR-MCNC: 144 MG/DL — HIGH (ref 70–99)
GLUCOSE BLDC GLUCOMTR-MCNC: 145 MG/DL — HIGH (ref 70–99)
GLUCOSE BLDC GLUCOMTR-MCNC: 146 MG/DL — HIGH (ref 70–99)
GLUCOSE BLDC GLUCOMTR-MCNC: 148 MG/DL — HIGH (ref 70–99)
GLUCOSE BLDC GLUCOMTR-MCNC: 152 MG/DL — HIGH (ref 70–99)
GLUCOSE BLDC GLUCOMTR-MCNC: 155 MG/DL — HIGH (ref 70–99)
GLUCOSE BLDC GLUCOMTR-MCNC: 156 MG/DL — HIGH (ref 70–99)
GLUCOSE BLDC GLUCOMTR-MCNC: 157 MG/DL — HIGH (ref 70–99)
GLUCOSE BLDC GLUCOMTR-MCNC: 159 MG/DL — HIGH (ref 70–99)
GLUCOSE BLDC GLUCOMTR-MCNC: 163 MG/DL — HIGH (ref 70–99)
GLUCOSE BLDC GLUCOMTR-MCNC: 163 MG/DL — HIGH (ref 70–99)
GLUCOSE BLDC GLUCOMTR-MCNC: 164 MG/DL — HIGH (ref 70–99)
GLUCOSE BLDC GLUCOMTR-MCNC: 167 MG/DL — HIGH (ref 70–99)
GLUCOSE BLDC GLUCOMTR-MCNC: 174 MG/DL — HIGH (ref 70–99)
GLUCOSE BLDC GLUCOMTR-MCNC: 175 MG/DL — HIGH (ref 70–99)
GLUCOSE BLDC GLUCOMTR-MCNC: 175 MG/DL — HIGH (ref 70–99)
GLUCOSE BLDC GLUCOMTR-MCNC: 185 MG/DL — HIGH (ref 70–99)
GLUCOSE BLDC GLUCOMTR-MCNC: 198 MG/DL — HIGH (ref 70–99)
GLUCOSE BLDC GLUCOMTR-MCNC: 235 MG/DL — HIGH (ref 70–99)
GLUCOSE BLDC GLUCOMTR-MCNC: 98 MG/DL — SIGNIFICANT CHANGE UP (ref 70–99)
GLUCOSE SERPL-MCNC: 103 MG/DL — HIGH (ref 70–99)
GLUCOSE SERPL-MCNC: 105 MG/DL — HIGH (ref 70–99)
GLUCOSE SERPL-MCNC: 111 MG/DL — HIGH (ref 70–99)
GLUCOSE SERPL-MCNC: 116 MG/DL — HIGH (ref 70–99)
GLUCOSE SERPL-MCNC: 122 MG/DL — HIGH (ref 70–99)
GLUCOSE SERPL-MCNC: 130 MG/DL — HIGH (ref 70–99)
GLUCOSE SERPL-MCNC: 132 MG/DL — HIGH (ref 70–99)
GLUCOSE SERPL-MCNC: 135 MG/DL — HIGH (ref 70–99)
GLUCOSE SERPL-MCNC: 137 MG/DL — HIGH (ref 70–99)
GLUCOSE SERPL-MCNC: 151 MG/DL — HIGH (ref 70–99)
GLUCOSE SERPL-MCNC: 161 MG/DL — HIGH (ref 70–99)
GLUCOSE SERPL-MCNC: 162 MG/DL — HIGH (ref 70–99)
GLUCOSE SERPL-MCNC: 164 MG/DL — HIGH (ref 70–99)
GLUCOSE SERPL-MCNC: 164 MG/DL — HIGH (ref 70–99)
GLUCOSE SERPL-MCNC: 165 MG/DL — HIGH (ref 70–99)
GLUCOSE SERPL-MCNC: 168 MG/DL — HIGH (ref 70–99)
GLUCOSE SERPL-MCNC: 179 MG/DL — HIGH (ref 70–99)
GLUCOSE SERPL-MCNC: 182 MG/DL — HIGH (ref 70–99)
GLUCOSE SERPL-MCNC: 192 MG/DL — HIGH (ref 70–99)
GLUCOSE SERPL-MCNC: 198 MG/DL — HIGH (ref 70–99)
GLUCOSE SERPL-MCNC: 198 MG/DL — HIGH (ref 70–99)
GLUCOSE SERPL-MCNC: 213 MG/DL — HIGH (ref 70–99)
GLUCOSE SERPL-MCNC: 238 MG/DL — HIGH (ref 70–99)
GLUCOSE UR QL: NEGATIVE MG/DL — SIGNIFICANT CHANGE UP
GLUCOSE UR QL: NEGATIVE — SIGNIFICANT CHANGE UP
GRAM STN FLD: SIGNIFICANT CHANGE UP
HAV IGM SER-ACNC: SIGNIFICANT CHANGE UP
HBV CORE IGM SER-ACNC: SIGNIFICANT CHANGE UP
HBV SURFACE AG SER-ACNC: SIGNIFICANT CHANGE UP
HCO3 BLDA-SCNC: 22 MMOL/L — SIGNIFICANT CHANGE UP (ref 21–28)
HCO3 BLDA-SCNC: 24 MMOL/L — SIGNIFICANT CHANGE UP (ref 21–28)
HCO3 BLDA-SCNC: 26 MMOL/L — SIGNIFICANT CHANGE UP (ref 21–28)
HCO3 BLDA-SCNC: 29 MMOL/L — HIGH (ref 21–28)
HCO3 BLDA-SCNC: 29 MMOL/L — HIGH (ref 21–28)
HCO3 BLDA-SCNC: 34 MMOL/L — HIGH (ref 21–28)
HCT VFR BLD CALC: 39.4 % — SIGNIFICANT CHANGE UP (ref 39–50)
HCT VFR BLD CALC: 40.4 % — SIGNIFICANT CHANGE UP (ref 39–50)
HCT VFR BLD CALC: 41.2 % — SIGNIFICANT CHANGE UP (ref 39–50)
HCT VFR BLD CALC: 42 % — SIGNIFICANT CHANGE UP (ref 39–50)
HCT VFR BLD CALC: 42.3 % — SIGNIFICANT CHANGE UP (ref 39–50)
HCT VFR BLD CALC: 42.7 % — SIGNIFICANT CHANGE UP (ref 39–50)
HCT VFR BLD CALC: 43.7 % — SIGNIFICANT CHANGE UP (ref 39–50)
HCT VFR BLD CALC: 43.7 % — SIGNIFICANT CHANGE UP (ref 39–50)
HCT VFR BLD CALC: 44 % — SIGNIFICANT CHANGE UP (ref 39–50)
HCT VFR BLD CALC: 44.3 % — SIGNIFICANT CHANGE UP (ref 39–50)
HCT VFR BLD CALC: 45.2 % — SIGNIFICANT CHANGE UP (ref 39–50)
HCT VFR BLD CALC: 47.2 % — SIGNIFICANT CHANGE UP (ref 39–50)
HCT VFR BLD CALC: 47.2 % — SIGNIFICANT CHANGE UP (ref 39–50)
HCT VFR BLD CALC: 47.6 % — SIGNIFICANT CHANGE UP (ref 39–50)
HCT VFR BLD CALC: 48 % — SIGNIFICANT CHANGE UP (ref 39–50)
HCT VFR BLD CALC: 48.4 % — SIGNIFICANT CHANGE UP (ref 39–50)
HCT VFR BLD CALC: 49.4 % — SIGNIFICANT CHANGE UP (ref 39–50)
HCT VFR BLD CALC: 49.6 % — SIGNIFICANT CHANGE UP (ref 39–50)
HCT VFR BLD CALC: 50.6 % — HIGH (ref 39–50)
HCT VFR BLD CALC: 51.7 % — HIGH (ref 39–50)
HCT VFR BLD CALC: 52.2 % — HIGH (ref 39–50)
HCT VFR BLD CALC: 54.9 % — HIGH (ref 39–50)
HCV AB S/CO SERPL IA: 0.1 S/CO — SIGNIFICANT CHANGE UP (ref 0–0.99)
HCV AB SERPL-IMP: SIGNIFICANT CHANGE UP
HDLC SERPL-MCNC: 20 MG/DL — LOW
HGB BLD-MCNC: 12.7 G/DL — LOW (ref 13–17)
HGB BLD-MCNC: 13 G/DL — SIGNIFICANT CHANGE UP (ref 13–17)
HGB BLD-MCNC: 13.2 G/DL — SIGNIFICANT CHANGE UP (ref 13–17)
HGB BLD-MCNC: 13.3 G/DL — SIGNIFICANT CHANGE UP (ref 13–17)
HGB BLD-MCNC: 13.6 G/DL — SIGNIFICANT CHANGE UP (ref 13–17)
HGB BLD-MCNC: 13.6 G/DL — SIGNIFICANT CHANGE UP (ref 13–17)
HGB BLD-MCNC: 13.7 G/DL — SIGNIFICANT CHANGE UP (ref 13–17)
HGB BLD-MCNC: 13.8 G/DL — SIGNIFICANT CHANGE UP (ref 13–17)
HGB BLD-MCNC: 13.8 G/DL — SIGNIFICANT CHANGE UP (ref 13–17)
HGB BLD-MCNC: 13.9 G/DL — SIGNIFICANT CHANGE UP (ref 13–17)
HGB BLD-MCNC: 14 G/DL — SIGNIFICANT CHANGE UP (ref 13–17)
HGB BLD-MCNC: 14 G/DL — SIGNIFICANT CHANGE UP (ref 13–17)
HGB BLD-MCNC: 14.5 G/DL — SIGNIFICANT CHANGE UP (ref 13–17)
HGB BLD-MCNC: 14.9 G/DL — SIGNIFICANT CHANGE UP (ref 13–17)
HGB BLD-MCNC: 15.2 G/DL — SIGNIFICANT CHANGE UP (ref 13–17)
HGB BLD-MCNC: 15.2 G/DL — SIGNIFICANT CHANGE UP (ref 13–17)
HGB BLD-MCNC: 15.4 G/DL — SIGNIFICANT CHANGE UP (ref 13–17)
HGB BLD-MCNC: 15.5 G/DL — SIGNIFICANT CHANGE UP (ref 13–17)
HGB BLD-MCNC: 15.6 G/DL — SIGNIFICANT CHANGE UP (ref 13–17)
HGB BLD-MCNC: 15.9 G/DL — SIGNIFICANT CHANGE UP (ref 13–17)
HGB BLD-MCNC: 16.3 G/DL — SIGNIFICANT CHANGE UP (ref 13–17)
HGB BLD-MCNC: 16.4 G/DL — SIGNIFICANT CHANGE UP (ref 13–17)
HGB BLD-MCNC: 16.6 G/DL — SIGNIFICANT CHANGE UP (ref 13–17)
HGB BLD-MCNC: 16.8 G/DL — SIGNIFICANT CHANGE UP (ref 13–17)
HOROWITZ INDEX BLDA+IHG-RTO: 100 — SIGNIFICANT CHANGE UP
HOROWITZ INDEX BLDA+IHG-RTO: 100 — SIGNIFICANT CHANGE UP
HOROWITZ INDEX BLDA+IHG-RTO: 32 — SIGNIFICANT CHANGE UP
HOROWITZ INDEX BLDA+IHG-RTO: 40 — SIGNIFICANT CHANGE UP
HOROWITZ INDEX BLDA+IHG-RTO: 50 — SIGNIFICANT CHANGE UP
HOROWITZ INDEX BLDA+IHG-RTO: 60 — SIGNIFICANT CHANGE UP
IMM GRANULOCYTES NFR BLD AUTO: 0.5 % — SIGNIFICANT CHANGE UP (ref 0–0.9)
IMM GRANULOCYTES NFR BLD AUTO: 0.5 % — SIGNIFICANT CHANGE UP (ref 0–0.9)
IMM GRANULOCYTES NFR BLD AUTO: 0.5 % — SIGNIFICANT CHANGE UP (ref 0–1.5)
IMM GRANULOCYTES NFR BLD AUTO: 1 % — SIGNIFICANT CHANGE UP (ref 0–1.5)
IMM GRANULOCYTES NFR BLD AUTO: 1.1 % — HIGH (ref 0–0.9)
IMM GRANULOCYTES NFR BLD AUTO: 1.5 % — SIGNIFICANT CHANGE UP (ref 0–1.5)
IMM GRANULOCYTES NFR BLD AUTO: 3.3 % — HIGH (ref 0–0.9)
INR BLD: 1.46 RATIO — HIGH (ref 0.88–1.16)
INR BLD: 2.69 RATIO — HIGH (ref 0.88–1.16)
INR BLD: 2.85 RATIO — HIGH (ref 0.88–1.16)
INR BLD: 3.12 RATIO — HIGH (ref 0.88–1.16)
KETONES UR-MCNC: NEGATIVE — SIGNIFICANT CHANGE UP
KETONES UR-MCNC: NEGATIVE — SIGNIFICANT CHANGE UP
LACTATE SERPL-SCNC: 0.7 MMOL/L — SIGNIFICANT CHANGE UP (ref 0.7–2)
LACTATE SERPL-SCNC: 1.8 MMOL/L — SIGNIFICANT CHANGE UP (ref 0.7–2)
LACTATE SERPL-SCNC: 1.9 MMOL/L — SIGNIFICANT CHANGE UP (ref 0.7–2)
LACTATE SERPL-SCNC: 2.5 MMOL/L — HIGH (ref 0.7–2)
LACTATE SERPL-SCNC: 3.2 MMOL/L — HIGH (ref 0.7–2)
LACTATE SERPL-SCNC: 3.2 MMOL/L — HIGH (ref 0.7–2)
LACTATE SERPL-SCNC: 5.5 MMOL/L — CRITICAL HIGH (ref 0.7–2)
LDH SERPL L TO P-CCNC: 224 U/L — SIGNIFICANT CHANGE UP (ref 50–242)
LEUKOCYTE ESTERASE UR-ACNC: ABNORMAL
LEUKOCYTE ESTERASE UR-ACNC: NEGATIVE — SIGNIFICANT CHANGE UP
LIDOCAIN IGE QN: 106 U/L — SIGNIFICANT CHANGE UP (ref 73–393)
LIPID PNL WITH DIRECT LDL SERPL: 101 MG/DL — HIGH
LYMPHOCYTES # BLD AUTO: 0.45 K/UL — LOW (ref 1–3.3)
LYMPHOCYTES # BLD AUTO: 0.53 K/UL — LOW (ref 1–3.3)
LYMPHOCYTES # BLD AUTO: 0.6 K/UL — LOW (ref 1–3.3)
LYMPHOCYTES # BLD AUTO: 0.61 K/UL — LOW (ref 1–3.3)
LYMPHOCYTES # BLD AUTO: 0.63 K/UL — LOW (ref 1–3.3)
LYMPHOCYTES # BLD AUTO: 0.7 K/UL — LOW (ref 1–3.3)
LYMPHOCYTES # BLD AUTO: 0.7 K/UL — LOW (ref 1–3.3)
LYMPHOCYTES # BLD AUTO: 10 % — LOW (ref 13–44)
LYMPHOCYTES # BLD AUTO: 11.6 % — LOW (ref 13–44)
LYMPHOCYTES # BLD AUTO: 2.3 % — LOW (ref 13–44)
LYMPHOCYTES # BLD AUTO: 2.9 % — LOW (ref 13–44)
LYMPHOCYTES # BLD AUTO: 3 % — LOW (ref 13–44)
LYMPHOCYTES # BLD AUTO: 3.8 % — LOW (ref 13–44)
LYMPHOCYTES # BLD AUTO: 4.89 K/UL — HIGH (ref 1–3.3)
LYMPHOCYTES # BLD AUTO: 5.4 % — LOW (ref 13–44)
LYMPHOCYTES # BLD AUTO: 50.6 % — HIGH (ref 13–44)
MAGNESIUM SERPL-MCNC: 2.1 MG/DL — SIGNIFICANT CHANGE UP (ref 1.6–2.6)
MAGNESIUM SERPL-MCNC: 2.2 MG/DL — SIGNIFICANT CHANGE UP (ref 1.6–2.6)
MAGNESIUM SERPL-MCNC: 2.6 MG/DL — SIGNIFICANT CHANGE UP (ref 1.6–2.6)
MAGNESIUM SERPL-MCNC: 2.7 MG/DL — HIGH (ref 1.6–2.6)
MAGNESIUM SERPL-MCNC: 5.8 MG/DL — HIGH (ref 1.6–2.6)
MCHC RBC-ENTMCNC: 26.6 PG — LOW (ref 27–34)
MCHC RBC-ENTMCNC: 26.6 PG — LOW (ref 27–34)
MCHC RBC-ENTMCNC: 26.7 PG — LOW (ref 27–34)
MCHC RBC-ENTMCNC: 26.7 PG — LOW (ref 27–34)
MCHC RBC-ENTMCNC: 26.8 PG — LOW (ref 27–34)
MCHC RBC-ENTMCNC: 26.9 PG — LOW (ref 27–34)
MCHC RBC-ENTMCNC: 27 PG — SIGNIFICANT CHANGE UP (ref 27–34)
MCHC RBC-ENTMCNC: 27 PG — SIGNIFICANT CHANGE UP (ref 27–34)
MCHC RBC-ENTMCNC: 27.1 PG — SIGNIFICANT CHANGE UP (ref 27–34)
MCHC RBC-ENTMCNC: 27.1 PG — SIGNIFICANT CHANGE UP (ref 27–34)
MCHC RBC-ENTMCNC: 27.2 PG — SIGNIFICANT CHANGE UP (ref 27–34)
MCHC RBC-ENTMCNC: 27.9 PG — SIGNIFICANT CHANGE UP (ref 27–34)
MCHC RBC-ENTMCNC: 29.9 PG — SIGNIFICANT CHANGE UP (ref 27–34)
MCHC RBC-ENTMCNC: 30.1 PG — SIGNIFICANT CHANGE UP (ref 27–34)
MCHC RBC-ENTMCNC: 30.1 PG — SIGNIFICANT CHANGE UP (ref 27–34)
MCHC RBC-ENTMCNC: 30.2 GM/DL — LOW (ref 32–36)
MCHC RBC-ENTMCNC: 30.2 PG — SIGNIFICANT CHANGE UP (ref 27–34)
MCHC RBC-ENTMCNC: 30.3 PG — SIGNIFICANT CHANGE UP (ref 27–34)
MCHC RBC-ENTMCNC: 30.4 PG — SIGNIFICANT CHANGE UP (ref 27–34)
MCHC RBC-ENTMCNC: 30.6 PG — SIGNIFICANT CHANGE UP (ref 27–34)
MCHC RBC-ENTMCNC: 30.7 PG — SIGNIFICANT CHANGE UP (ref 27–34)
MCHC RBC-ENTMCNC: 30.8 GM/DL — LOW (ref 32–36)
MCHC RBC-ENTMCNC: 30.9 GM/DL — LOW (ref 32–36)
MCHC RBC-ENTMCNC: 31 GM/DL — LOW (ref 32–36)
MCHC RBC-ENTMCNC: 31.1 GM/DL — LOW (ref 32–36)
MCHC RBC-ENTMCNC: 31.4 GM/DL — LOW (ref 32–36)
MCHC RBC-ENTMCNC: 31.4 GM/DL — LOW (ref 32–36)
MCHC RBC-ENTMCNC: 31.5 GM/DL — LOW (ref 32–36)
MCHC RBC-ENTMCNC: 31.5 GM/DL — LOW (ref 32–36)
MCHC RBC-ENTMCNC: 31.6 GM/DL — LOW (ref 32–36)
MCHC RBC-ENTMCNC: 31.6 GM/DL — LOW (ref 32–36)
MCHC RBC-ENTMCNC: 31.8 GM/DL — LOW (ref 32–36)
MCHC RBC-ENTMCNC: 31.8 GM/DL — LOW (ref 32–36)
MCHC RBC-ENTMCNC: 32.1 GM/DL — SIGNIFICANT CHANGE UP (ref 32–36)
MCHC RBC-ENTMCNC: 32.2 GM/DL — SIGNIFICANT CHANGE UP (ref 32–36)
MCHC RBC-ENTMCNC: 32.3 GM/DL — SIGNIFICANT CHANGE UP (ref 32–36)
MCHC RBC-ENTMCNC: 32.4 GM/DL — SIGNIFICANT CHANGE UP (ref 32–36)
MCHC RBC-ENTMCNC: 32.6 GM/DL — SIGNIFICANT CHANGE UP (ref 32–36)
MCHC RBC-ENTMCNC: 32.7 GM/DL — SIGNIFICANT CHANGE UP (ref 32–36)
MCHC RBC-ENTMCNC: 33 GM/DL — SIGNIFICANT CHANGE UP (ref 32–36)
MCHC RBC-ENTMCNC: 33 GM/DL — SIGNIFICANT CHANGE UP (ref 32–36)
MCV RBC AUTO: 82.2 FL — SIGNIFICANT CHANGE UP (ref 80–100)
MCV RBC AUTO: 82.4 FL — SIGNIFICANT CHANGE UP (ref 80–100)
MCV RBC AUTO: 83 FL — SIGNIFICANT CHANGE UP (ref 80–100)
MCV RBC AUTO: 84.4 FL — SIGNIFICANT CHANGE UP (ref 80–100)
MCV RBC AUTO: 84.5 FL — SIGNIFICANT CHANGE UP (ref 80–100)
MCV RBC AUTO: 85.4 FL — SIGNIFICANT CHANGE UP (ref 80–100)
MCV RBC AUTO: 85.5 FL — SIGNIFICANT CHANGE UP (ref 80–100)
MCV RBC AUTO: 85.9 FL — SIGNIFICANT CHANGE UP (ref 80–100)
MCV RBC AUTO: 86.1 FL — SIGNIFICANT CHANGE UP (ref 80–100)
MCV RBC AUTO: 86.3 FL — SIGNIFICANT CHANGE UP (ref 80–100)
MCV RBC AUTO: 86.3 FL — SIGNIFICANT CHANGE UP (ref 80–100)
MCV RBC AUTO: 86.6 FL — SIGNIFICANT CHANGE UP (ref 80–100)
MCV RBC AUTO: 87.2 FL — SIGNIFICANT CHANGE UP (ref 80–100)
MCV RBC AUTO: 87.8 FL — SIGNIFICANT CHANGE UP (ref 80–100)
MCV RBC AUTO: 93 FL — SIGNIFICANT CHANGE UP (ref 80–100)
MCV RBC AUTO: 93.5 FL — SIGNIFICANT CHANGE UP (ref 80–100)
MCV RBC AUTO: 93.5 FL — SIGNIFICANT CHANGE UP (ref 80–100)
MCV RBC AUTO: 93.6 FL — SIGNIFICANT CHANGE UP (ref 80–100)
MCV RBC AUTO: 94 FL — SIGNIFICANT CHANGE UP (ref 80–100)
MCV RBC AUTO: 94.7 FL — SIGNIFICANT CHANGE UP (ref 80–100)
MCV RBC AUTO: 94.9 FL — SIGNIFICANT CHANGE UP (ref 80–100)
MCV RBC AUTO: 95.2 FL — SIGNIFICANT CHANGE UP (ref 80–100)
MCV RBC AUTO: 95.7 FL — SIGNIFICANT CHANGE UP (ref 80–100)
MCV RBC AUTO: 99.6 FL — SIGNIFICANT CHANGE UP (ref 80–100)
METHADONE UR-MCNC: NEGATIVE — SIGNIFICANT CHANGE UP
METHOD TYPE: SIGNIFICANT CHANGE UP
MONOCYTES # BLD AUTO: 0.7 K/UL — SIGNIFICANT CHANGE UP (ref 0–0.9)
MONOCYTES # BLD AUTO: 0.74 K/UL — SIGNIFICANT CHANGE UP (ref 0–0.9)
MONOCYTES # BLD AUTO: 0.78 K/UL — SIGNIFICANT CHANGE UP (ref 0–0.9)
MONOCYTES # BLD AUTO: 0.79 K/UL — SIGNIFICANT CHANGE UP (ref 0–0.9)
MONOCYTES # BLD AUTO: 0.87 K/UL — SIGNIFICANT CHANGE UP (ref 0–0.9)
MONOCYTES # BLD AUTO: 0.9 K/UL — SIGNIFICANT CHANGE UP (ref 0–0.9)
MONOCYTES # BLD AUTO: 0.91 K/UL — HIGH (ref 0–0.9)
MONOCYTES # BLD AUTO: 1.18 K/UL — HIGH (ref 0–0.9)
MONOCYTES NFR BLD AUTO: 15.1 % — HIGH (ref 2–14)
MONOCYTES NFR BLD AUTO: 17 % — HIGH (ref 2–14)
MONOCYTES NFR BLD AUTO: 3.2 % — SIGNIFICANT CHANGE UP (ref 2–14)
MONOCYTES NFR BLD AUTO: 4.1 % — SIGNIFICANT CHANGE UP (ref 2–14)
MONOCYTES NFR BLD AUTO: 5 % — SIGNIFICANT CHANGE UP (ref 2–14)
MONOCYTES NFR BLD AUTO: 5.1 % — SIGNIFICANT CHANGE UP (ref 2–14)
MONOCYTES NFR BLD AUTO: 7.7 % — SIGNIFICANT CHANGE UP (ref 2–14)
MONOCYTES NFR BLD AUTO: 7.7 % — SIGNIFICANT CHANGE UP (ref 2–14)
MRSA PCR RESULT.: DETECTED
MSSA DNA SPEC QL NAA+PROBE: SIGNIFICANT CHANGE UP
NEUTROPHILS # BLD AUTO: 14.2 K/UL — HIGH (ref 1.8–7.4)
NEUTROPHILS # BLD AUTO: 15.84 K/UL — HIGH (ref 1.8–7.4)
NEUTROPHILS # BLD AUTO: 17.87 K/UL — HIGH (ref 1.8–7.4)
NEUTROPHILS # BLD AUTO: 20.16 K/UL — HIGH (ref 1.8–7.4)
NEUTROPHILS # BLD AUTO: 3.72 K/UL — SIGNIFICANT CHANGE UP (ref 1.8–7.4)
NEUTROPHILS # BLD AUTO: 4.36 K/UL — SIGNIFICANT CHANGE UP (ref 1.8–7.4)
NEUTROPHILS # BLD AUTO: 5.09 K/UL — SIGNIFICANT CHANGE UP (ref 1.8–7.4)
NEUTROPHILS # BLD AUTO: 9.05 K/UL — HIGH (ref 1.8–7.4)
NEUTROPHILS NFR BLD AUTO: 38.5 % — LOW (ref 43–77)
NEUTROPHILS NFR BLD AUTO: 72.3 % — SIGNIFICANT CHANGE UP (ref 43–77)
NEUTROPHILS NFR BLD AUTO: 73 % — SIGNIFICANT CHANGE UP (ref 43–77)
NEUTROPHILS NFR BLD AUTO: 80.4 % — HIGH (ref 43–77)
NEUTROPHILS NFR BLD AUTO: 90.1 % — HIGH (ref 43–77)
NEUTROPHILS NFR BLD AUTO: 90.2 % — HIGH (ref 43–77)
NEUTROPHILS NFR BLD AUTO: 92.7 % — HIGH (ref 43–77)
NEUTROPHILS NFR BLD AUTO: 92.9 % — HIGH (ref 43–77)
NITRITE UR-MCNC: NEGATIVE — SIGNIFICANT CHANGE UP
NITRITE UR-MCNC: NEGATIVE — SIGNIFICANT CHANGE UP
NON HDL CHOLESTEROL: 119 MG/DL — SIGNIFICANT CHANGE UP
NRBC # BLD: 0 /100 WBCS — SIGNIFICANT CHANGE UP (ref 0–0)
NRBC # BLD: SIGNIFICANT CHANGE UP /100 WBCS (ref 0–0)
NT-PROBNP SERPL-SCNC: HIGH PG/ML (ref 0–125)
OPIATES UR-MCNC: NEGATIVE — SIGNIFICANT CHANGE UP
ORGANISM # SPEC MICROSCOPIC CNT: SIGNIFICANT CHANGE UP
PCO2 BLDA: 34 MMHG — LOW (ref 35–48)
PCO2 BLDA: 41 MMHG — SIGNIFICANT CHANGE UP (ref 35–48)
PCO2 BLDA: 42 MMHG — SIGNIFICANT CHANGE UP (ref 35–48)
PCO2 BLDA: 44 MMHG — SIGNIFICANT CHANGE UP (ref 35–48)
PCO2 BLDA: 53 MMHG — HIGH (ref 35–48)
PCO2 BLDA: 95 MMHG — CRITICAL HIGH (ref 35–48)
PCP SPEC-MCNC: SIGNIFICANT CHANGE UP
PCP UR-MCNC: NEGATIVE — SIGNIFICANT CHANGE UP
PH BLDA: 7.05 — CRITICAL LOW (ref 7.35–7.45)
PH BLDA: 7.33 — LOW (ref 7.35–7.45)
PH BLDA: 7.42 — SIGNIFICANT CHANGE UP (ref 7.35–7.45)
PH BLDA: 7.43 — SIGNIFICANT CHANGE UP (ref 7.35–7.45)
PH BLDA: 7.46 — HIGH (ref 7.35–7.45)
PH BLDA: 7.46 — HIGH (ref 7.35–7.45)
PH UR: 5 — SIGNIFICANT CHANGE UP (ref 5–8)
PH UR: 6 — SIGNIFICANT CHANGE UP (ref 5–8)
PHOSPHATE SERPL-MCNC: 4.1 MG/DL — SIGNIFICANT CHANGE UP (ref 2.5–4.5)
PHOSPHATE SERPL-MCNC: 4.1 MG/DL — SIGNIFICANT CHANGE UP (ref 2.5–4.5)
PHOSPHATE SERPL-MCNC: 4.4 MG/DL — SIGNIFICANT CHANGE UP (ref 2.5–4.5)
PHOSPHATE SERPL-MCNC: 5.1 MG/DL — HIGH (ref 2.4–4.7)
PLATELET # BLD AUTO: 100 K/UL — LOW (ref 150–400)
PLATELET # BLD AUTO: 102 K/UL — LOW (ref 150–400)
PLATELET # BLD AUTO: 116 K/UL — LOW (ref 150–400)
PLATELET # BLD AUTO: 132 K/UL — LOW (ref 150–400)
PLATELET # BLD AUTO: 136 K/UL — LOW (ref 150–400)
PLATELET # BLD AUTO: 146 K/UL — LOW (ref 150–400)
PLATELET # BLD AUTO: 146 K/UL — LOW (ref 150–400)
PLATELET # BLD AUTO: 154 K/UL — SIGNIFICANT CHANGE UP (ref 150–400)
PLATELET # BLD AUTO: 157 K/UL — SIGNIFICANT CHANGE UP (ref 150–400)
PLATELET # BLD AUTO: 162 K/UL — SIGNIFICANT CHANGE UP (ref 150–400)
PLATELET # BLD AUTO: 173 K/UL — SIGNIFICANT CHANGE UP (ref 150–400)
PLATELET # BLD AUTO: 174 K/UL — SIGNIFICANT CHANGE UP (ref 150–400)
PLATELET # BLD AUTO: 175 K/UL — SIGNIFICANT CHANGE UP (ref 150–400)
PLATELET # BLD AUTO: 176 K/UL — SIGNIFICANT CHANGE UP (ref 150–400)
PLATELET # BLD AUTO: 176 K/UL — SIGNIFICANT CHANGE UP (ref 150–400)
PLATELET # BLD AUTO: 180 K/UL — SIGNIFICANT CHANGE UP (ref 150–400)
PLATELET # BLD AUTO: 182 K/UL — SIGNIFICANT CHANGE UP (ref 150–400)
PLATELET # BLD AUTO: 182 K/UL — SIGNIFICANT CHANGE UP (ref 150–400)
PLATELET # BLD AUTO: 184 K/UL — SIGNIFICANT CHANGE UP (ref 150–400)
PLATELET # BLD AUTO: 189 K/UL — SIGNIFICANT CHANGE UP (ref 150–400)
PLATELET # BLD AUTO: 193 K/UL — SIGNIFICANT CHANGE UP (ref 150–400)
PLATELET # BLD AUTO: 195 K/UL — SIGNIFICANT CHANGE UP (ref 150–400)
PLATELET # BLD AUTO: 204 K/UL — SIGNIFICANT CHANGE UP (ref 150–400)
PLATELET # BLD AUTO: 206 K/UL — SIGNIFICANT CHANGE UP (ref 150–400)
PO2 BLDA: 108 MMHG — SIGNIFICANT CHANGE UP (ref 83–108)
PO2 BLDA: 190 MMHG — HIGH (ref 83–108)
PO2 BLDA: 414 MMHG — HIGH (ref 83–108)
PO2 BLDA: 68 MMHG — LOW (ref 83–108)
PO2 BLDA: 78 MMHG — LOW (ref 83–108)
PO2 BLDA: 85 MMHG — SIGNIFICANT CHANGE UP (ref 83–108)
POTASSIUM SERPL-MCNC: 3.5 MMOL/L — SIGNIFICANT CHANGE UP (ref 3.5–5.3)
POTASSIUM SERPL-MCNC: 3.6 MMOL/L — SIGNIFICANT CHANGE UP (ref 3.5–5.3)
POTASSIUM SERPL-MCNC: 4.1 MMOL/L — SIGNIFICANT CHANGE UP (ref 3.5–5.3)
POTASSIUM SERPL-MCNC: 4.3 MMOL/L — SIGNIFICANT CHANGE UP (ref 3.5–5.3)
POTASSIUM SERPL-MCNC: 4.3 MMOL/L — SIGNIFICANT CHANGE UP (ref 3.5–5.3)
POTASSIUM SERPL-MCNC: 4.4 MMOL/L — SIGNIFICANT CHANGE UP (ref 3.5–5.3)
POTASSIUM SERPL-MCNC: 4.4 MMOL/L — SIGNIFICANT CHANGE UP (ref 3.5–5.3)
POTASSIUM SERPL-MCNC: 4.5 MMOL/L — SIGNIFICANT CHANGE UP (ref 3.5–5.3)
POTASSIUM SERPL-MCNC: 4.7 MMOL/L — SIGNIFICANT CHANGE UP (ref 3.5–5.3)
POTASSIUM SERPL-MCNC: 4.8 MMOL/L — SIGNIFICANT CHANGE UP (ref 3.5–5.3)
POTASSIUM SERPL-MCNC: 4.9 MMOL/L — SIGNIFICANT CHANGE UP (ref 3.5–5.3)
POTASSIUM SERPL-MCNC: 4.9 MMOL/L — SIGNIFICANT CHANGE UP (ref 3.5–5.3)
POTASSIUM SERPL-MCNC: 5 MMOL/L — SIGNIFICANT CHANGE UP (ref 3.5–5.3)
POTASSIUM SERPL-MCNC: 5.1 MMOL/L — SIGNIFICANT CHANGE UP (ref 3.5–5.3)
POTASSIUM SERPL-MCNC: 5.7 MMOL/L — HIGH (ref 3.5–5.3)
POTASSIUM SERPL-SCNC: 3.5 MMOL/L — SIGNIFICANT CHANGE UP (ref 3.5–5.3)
POTASSIUM SERPL-SCNC: 3.6 MMOL/L — SIGNIFICANT CHANGE UP (ref 3.5–5.3)
POTASSIUM SERPL-SCNC: 4.1 MMOL/L — SIGNIFICANT CHANGE UP (ref 3.5–5.3)
POTASSIUM SERPL-SCNC: 4.3 MMOL/L — SIGNIFICANT CHANGE UP (ref 3.5–5.3)
POTASSIUM SERPL-SCNC: 4.3 MMOL/L — SIGNIFICANT CHANGE UP (ref 3.5–5.3)
POTASSIUM SERPL-SCNC: 4.4 MMOL/L — SIGNIFICANT CHANGE UP (ref 3.5–5.3)
POTASSIUM SERPL-SCNC: 4.4 MMOL/L — SIGNIFICANT CHANGE UP (ref 3.5–5.3)
POTASSIUM SERPL-SCNC: 4.5 MMOL/L — SIGNIFICANT CHANGE UP (ref 3.5–5.3)
POTASSIUM SERPL-SCNC: 4.7 MMOL/L — SIGNIFICANT CHANGE UP (ref 3.5–5.3)
POTASSIUM SERPL-SCNC: 4.8 MMOL/L — SIGNIFICANT CHANGE UP (ref 3.5–5.3)
POTASSIUM SERPL-SCNC: 4.9 MMOL/L — SIGNIFICANT CHANGE UP (ref 3.5–5.3)
POTASSIUM SERPL-SCNC: 4.9 MMOL/L — SIGNIFICANT CHANGE UP (ref 3.5–5.3)
POTASSIUM SERPL-SCNC: 5 MMOL/L — SIGNIFICANT CHANGE UP (ref 3.5–5.3)
POTASSIUM SERPL-SCNC: 5.1 MMOL/L — SIGNIFICANT CHANGE UP (ref 3.5–5.3)
POTASSIUM SERPL-SCNC: 5.7 MMOL/L — HIGH (ref 3.5–5.3)
PROCALCITONIN SERPL-MCNC: 0.23 NG/ML — HIGH (ref 0.02–0.1)
PROCALCITONIN SERPL-MCNC: 0.94 NG/ML — HIGH (ref 0–0.04)
PROCALCITONIN SERPL-MCNC: 24.04 NG/ML — HIGH (ref 0–0.04)
PROCALCITONIN SERPL-MCNC: 24.5 NG/ML — HIGH (ref 0.02–0.1)
PROCALCITONIN SERPL-MCNC: 31.1 NG/ML — HIGH (ref 0.02–0.1)
PROT SERPL-MCNC: 5.3 G/DL — LOW (ref 6–8.3)
PROT SERPL-MCNC: 5.3 G/DL — LOW (ref 6–8.3)
PROT SERPL-MCNC: 5.6 G/DL — LOW (ref 6–8.3)
PROT SERPL-MCNC: 5.8 G/DL — LOW (ref 6–8.3)
PROT SERPL-MCNC: 6 G/DL — SIGNIFICANT CHANGE UP (ref 6–8.3)
PROT SERPL-MCNC: 6.1 G/DL — SIGNIFICANT CHANGE UP (ref 6–8.3)
PROT SERPL-MCNC: 6.4 G/DL — SIGNIFICANT CHANGE UP (ref 6–8.3)
PROT SERPL-MCNC: 6.5 G/DL — SIGNIFICANT CHANGE UP (ref 6–8.3)
PROT SERPL-MCNC: 6.5 G/DL — SIGNIFICANT CHANGE UP (ref 6–8.3)
PROT SERPL-MCNC: 6.9 G/DL — SIGNIFICANT CHANGE UP (ref 6–8.3)
PROT SERPL-MCNC: 7.4 G/DL — SIGNIFICANT CHANGE UP (ref 6–8.3)
PROT SERPL-MCNC: 7.5 G/DL — SIGNIFICANT CHANGE UP (ref 6–8.3)
PROT SERPL-MCNC: 9.2 G/DL — HIGH (ref 6–8.3)
PROT UR-MCNC: 100 MG/DL
PROT UR-MCNC: 15
PROTHROM AB SERPL-ACNC: 16.9 SEC — HIGH (ref 10.5–13.4)
PROTHROM AB SERPL-ACNC: 30 SEC — HIGH (ref 10.6–13.6)
PROTHROM AB SERPL-ACNC: 31.7 SEC — HIGH (ref 10.6–13.6)
PROTHROM AB SERPL-ACNC: 37.2 SEC — HIGH (ref 10.5–13.4)
RBC # BLD: 4.32 M/UL — SIGNIFICANT CHANGE UP (ref 4.2–5.8)
RBC # BLD: 4.5 M/UL — SIGNIFICANT CHANGE UP (ref 4.2–5.8)
RBC # BLD: 4.67 M/UL — SIGNIFICANT CHANGE UP (ref 4.2–5.8)
RBC # BLD: 4.88 M/UL — SIGNIFICANT CHANGE UP (ref 4.2–5.8)
RBC # BLD: 4.9 M/UL — SIGNIFICANT CHANGE UP (ref 4.2–5.8)
RBC # BLD: 5.01 M/UL — SIGNIFICANT CHANGE UP (ref 4.2–5.8)
RBC # BLD: 5.01 M/UL — SIGNIFICANT CHANGE UP (ref 4.2–5.8)
RBC # BLD: 5.02 M/UL — SIGNIFICANT CHANGE UP (ref 4.2–5.8)
RBC # BLD: 5.07 M/UL — SIGNIFICANT CHANGE UP (ref 4.2–5.8)
RBC # BLD: 5.08 M/UL — SIGNIFICANT CHANGE UP (ref 4.2–5.8)
RBC # BLD: 5.09 M/UL — SIGNIFICANT CHANGE UP (ref 4.2–5.8)
RBC # BLD: 5.11 M/UL — SIGNIFICANT CHANGE UP (ref 4.2–5.8)
RBC # BLD: 5.12 M/UL — SIGNIFICANT CHANGE UP (ref 4.2–5.8)
RBC # BLD: 5.21 M/UL — SIGNIFICANT CHANGE UP (ref 4.2–5.8)
RBC # BLD: 5.21 M/UL — SIGNIFICANT CHANGE UP (ref 4.2–5.8)
RBC # BLD: 5.22 M/UL — SIGNIFICANT CHANGE UP (ref 4.2–5.8)
RBC # BLD: 5.28 M/UL — SIGNIFICANT CHANGE UP (ref 4.2–5.8)
RBC # BLD: 5.34 M/UL — SIGNIFICANT CHANGE UP (ref 4.2–5.8)
RBC # BLD: 5.4 M/UL — SIGNIFICANT CHANGE UP (ref 4.2–5.8)
RBC # BLD: 5.41 M/UL — SIGNIFICANT CHANGE UP (ref 4.2–5.8)
RBC # BLD: 5.47 M/UL — SIGNIFICANT CHANGE UP (ref 4.2–5.8)
RBC # BLD: 5.51 M/UL — SIGNIFICANT CHANGE UP (ref 4.2–5.8)
RBC # BLD: 5.67 M/UL — SIGNIFICANT CHANGE UP (ref 4.2–5.8)
RBC # BLD: 6.29 M/UL — HIGH (ref 4.2–5.8)
RBC # FLD: 14.6 % — HIGH (ref 10.3–14.5)
RBC # FLD: 14.7 % — HIGH (ref 10.3–14.5)
RBC # FLD: 14.7 % — HIGH (ref 10.3–14.5)
RBC # FLD: 15 % — HIGH (ref 10.3–14.5)
RBC # FLD: 15.1 % — HIGH (ref 10.3–14.5)
RBC # FLD: 15.1 % — HIGH (ref 10.3–14.5)
RBC # FLD: 15.2 % — HIGH (ref 10.3–14.5)
RBC # FLD: 15.2 % — HIGH (ref 10.3–14.5)
RBC # FLD: 15.4 % — HIGH (ref 10.3–14.5)
RBC # FLD: 15.5 % — HIGH (ref 10.3–14.5)
RBC # FLD: 16 % — HIGH (ref 10.3–14.5)
RBC # FLD: 19.7 % — HIGH (ref 10.3–14.5)
RBC # FLD: 19.8 % — HIGH (ref 10.3–14.5)
RBC # FLD: 19.9 % — HIGH (ref 10.3–14.5)
RBC # FLD: 20.1 % — HIGH (ref 10.3–14.5)
RBC # FLD: 20.5 % — HIGH (ref 10.3–14.5)
RBC # FLD: 20.7 % — HIGH (ref 10.3–14.5)
RBC # FLD: 20.9 % — HIGH (ref 10.3–14.5)
RBC # FLD: 21 % — HIGH (ref 10.3–14.5)
RBC # FLD: 21.7 % — HIGH (ref 10.3–14.5)
RBC # FLD: 21.7 % — HIGH (ref 10.3–14.5)
RBC # FLD: 21.9 % — HIGH (ref 10.3–14.5)
RSV RNA NPH QL NAA+NON-PROBE: SIGNIFICANT CHANGE UP
RSV RNA NPH QL NAA+NON-PROBE: SIGNIFICANT CHANGE UP
S AUREUS DNA NOSE QL NAA+PROBE: DETECTED
SAO2 % BLDA: 89.3 % — LOW (ref 94–98)
SAO2 % BLDA: 97.3 % — SIGNIFICANT CHANGE UP (ref 94–98)
SAO2 % BLDA: 97.8 % — SIGNIFICANT CHANGE UP (ref 94–98)
SAO2 % BLDA: 98.8 % — HIGH (ref 94–98)
SAO2 % BLDA: 99.3 % — HIGH (ref 94–98)
SAO2 % BLDA: 99.7 % — HIGH (ref 94–98)
SARS-COV-2 RNA SPEC QL NAA+PROBE: DETECTED
SARS-COV-2 RNA SPEC QL NAA+PROBE: SIGNIFICANT CHANGE UP
SODIUM SERPL-SCNC: 127 MMOL/L — LOW (ref 135–145)
SODIUM SERPL-SCNC: 129 MMOL/L — LOW (ref 135–145)
SODIUM SERPL-SCNC: 133 MMOL/L — LOW (ref 135–145)
SODIUM SERPL-SCNC: 133 MMOL/L — LOW (ref 135–145)
SODIUM SERPL-SCNC: 134 MMOL/L — LOW (ref 135–145)
SODIUM SERPL-SCNC: 134 MMOL/L — LOW (ref 135–145)
SODIUM SERPL-SCNC: 135 MMOL/L — SIGNIFICANT CHANGE UP (ref 135–145)
SODIUM SERPL-SCNC: 138 MMOL/L — SIGNIFICANT CHANGE UP (ref 135–145)
SODIUM SERPL-SCNC: 138 MMOL/L — SIGNIFICANT CHANGE UP (ref 135–145)
SODIUM SERPL-SCNC: 139 MMOL/L — SIGNIFICANT CHANGE UP (ref 135–145)
SODIUM SERPL-SCNC: 139 MMOL/L — SIGNIFICANT CHANGE UP (ref 135–145)
SODIUM SERPL-SCNC: 141 MMOL/L — SIGNIFICANT CHANGE UP (ref 135–145)
SODIUM SERPL-SCNC: 142 MMOL/L — SIGNIFICANT CHANGE UP (ref 135–145)
SODIUM SERPL-SCNC: 142 MMOL/L — SIGNIFICANT CHANGE UP (ref 135–145)
SODIUM SERPL-SCNC: 144 MMOL/L — SIGNIFICANT CHANGE UP (ref 135–145)
SODIUM SERPL-SCNC: 144 MMOL/L — SIGNIFICANT CHANGE UP (ref 135–145)
SODIUM SERPL-SCNC: 146 MMOL/L — HIGH (ref 135–145)
SODIUM SERPL-SCNC: 147 MMOL/L — HIGH (ref 135–145)
SODIUM SERPL-SCNC: 148 MMOL/L — HIGH (ref 135–145)
SP GR SPEC: 1.01 — SIGNIFICANT CHANGE UP (ref 1.01–1.02)
SP GR SPEC: 1.01 — SIGNIFICANT CHANGE UP (ref 1.01–1.02)
SPECIMEN SOURCE: SIGNIFICANT CHANGE UP
THC UR QL: NEGATIVE — SIGNIFICANT CHANGE UP
TRIGL SERPL-MCNC: 90 MG/DL — SIGNIFICANT CHANGE UP
TROPONIN I, HIGH SENSITIVITY RESULT: 139.8 NG/L — HIGH
TROPONIN I, HIGH SENSITIVITY RESULT: 259.2 NG/L — HIGH
TROPONIN I, HIGH SENSITIVITY RESULT: 41.9 NG/L — SIGNIFICANT CHANGE UP
TROPONIN I, HIGH SENSITIVITY RESULT: 77.4 NG/L — HIGH
TSH SERPL-MCNC: 1.46 UIU/ML — SIGNIFICANT CHANGE UP (ref 0.36–3.74)
UROBILINOGEN FLD QL: NEGATIVE MG/DL — SIGNIFICANT CHANGE UP
UROBILINOGEN FLD QL: NEGATIVE — SIGNIFICANT CHANGE UP
WBC # BLD: 11.25 K/UL — HIGH (ref 3.8–10.5)
WBC # BLD: 11.78 K/UL — HIGH (ref 3.8–10.5)
WBC # BLD: 11.96 K/UL — HIGH (ref 3.8–10.5)
WBC # BLD: 12.51 K/UL — HIGH (ref 3.8–10.5)
WBC # BLD: 13.56 K/UL — HIGH (ref 3.8–10.5)
WBC # BLD: 13.69 K/UL — HIGH (ref 3.8–10.5)
WBC # BLD: 14.34 K/UL — HIGH (ref 3.8–10.5)
WBC # BLD: 15.77 K/UL — HIGH (ref 3.8–10.5)
WBC # BLD: 17.56 K/UL — HIGH (ref 3.8–10.5)
WBC # BLD: 19.23 K/UL — HIGH (ref 3.8–10.5)
WBC # BLD: 20.28 K/UL — HIGH (ref 3.8–10.5)
WBC # BLD: 21.77 K/UL — HIGH (ref 3.8–10.5)
WBC # BLD: 5.18 K/UL — SIGNIFICANT CHANGE UP (ref 3.8–10.5)
WBC # BLD: 5.24 K/UL — SIGNIFICANT CHANGE UP (ref 3.8–10.5)
WBC # BLD: 5.52 K/UL — SIGNIFICANT CHANGE UP (ref 3.8–10.5)
WBC # BLD: 6.03 K/UL — SIGNIFICANT CHANGE UP (ref 3.8–10.5)
WBC # BLD: 6.17 K/UL — SIGNIFICANT CHANGE UP (ref 3.8–10.5)
WBC # BLD: 6.33 K/UL — SIGNIFICANT CHANGE UP (ref 3.8–10.5)
WBC # BLD: 6.97 K/UL — SIGNIFICANT CHANGE UP (ref 3.8–10.5)
WBC # BLD: 9.66 K/UL — SIGNIFICANT CHANGE UP (ref 3.8–10.5)
WBC # BLD: 9.7 K/UL — SIGNIFICANT CHANGE UP (ref 3.8–10.5)
WBC # BLD: 9.75 K/UL — SIGNIFICANT CHANGE UP (ref 3.8–10.5)
WBC # BLD: 9.76 K/UL — SIGNIFICANT CHANGE UP (ref 3.8–10.5)
WBC # BLD: 9.9 K/UL — SIGNIFICANT CHANGE UP (ref 3.8–10.5)
WBC # FLD AUTO: 11.25 K/UL — HIGH (ref 3.8–10.5)
WBC # FLD AUTO: 11.78 K/UL — HIGH (ref 3.8–10.5)
WBC # FLD AUTO: 11.96 K/UL — HIGH (ref 3.8–10.5)
WBC # FLD AUTO: 12.51 K/UL — HIGH (ref 3.8–10.5)
WBC # FLD AUTO: 13.56 K/UL — HIGH (ref 3.8–10.5)
WBC # FLD AUTO: 13.69 K/UL — HIGH (ref 3.8–10.5)
WBC # FLD AUTO: 14.34 K/UL — HIGH (ref 3.8–10.5)
WBC # FLD AUTO: 15.77 K/UL — HIGH (ref 3.8–10.5)
WBC # FLD AUTO: 17.56 K/UL — HIGH (ref 3.8–10.5)
WBC # FLD AUTO: 19.23 K/UL — HIGH (ref 3.8–10.5)
WBC # FLD AUTO: 20.28 K/UL — HIGH (ref 3.8–10.5)
WBC # FLD AUTO: 21.77 K/UL — HIGH (ref 3.8–10.5)
WBC # FLD AUTO: 5.18 K/UL — SIGNIFICANT CHANGE UP (ref 3.8–10.5)
WBC # FLD AUTO: 5.24 K/UL — SIGNIFICANT CHANGE UP (ref 3.8–10.5)
WBC # FLD AUTO: 5.52 K/UL — SIGNIFICANT CHANGE UP (ref 3.8–10.5)
WBC # FLD AUTO: 6.03 K/UL — SIGNIFICANT CHANGE UP (ref 3.8–10.5)
WBC # FLD AUTO: 6.17 K/UL — SIGNIFICANT CHANGE UP (ref 3.8–10.5)
WBC # FLD AUTO: 6.33 K/UL — SIGNIFICANT CHANGE UP (ref 3.8–10.5)
WBC # FLD AUTO: 6.97 K/UL — SIGNIFICANT CHANGE UP (ref 3.8–10.5)
WBC # FLD AUTO: 9.66 K/UL — SIGNIFICANT CHANGE UP (ref 3.8–10.5)
WBC # FLD AUTO: 9.7 K/UL — SIGNIFICANT CHANGE UP (ref 3.8–10.5)
WBC # FLD AUTO: 9.75 K/UL — SIGNIFICANT CHANGE UP (ref 3.8–10.5)
WBC # FLD AUTO: 9.76 K/UL — SIGNIFICANT CHANGE UP (ref 3.8–10.5)
WBC # FLD AUTO: 9.9 K/UL — SIGNIFICANT CHANGE UP (ref 3.8–10.5)

## 2022-01-01 PROCEDURE — 99291 CRITICAL CARE FIRST HOUR: CPT

## 2022-01-01 PROCEDURE — 93005 ELECTROCARDIOGRAM TRACING: CPT

## 2022-01-01 PROCEDURE — 87635 SARS-COV-2 COVID-19 AMP PRB: CPT

## 2022-01-01 PROCEDURE — U0005: CPT

## 2022-01-01 PROCEDURE — 99223 1ST HOSP IP/OBS HIGH 75: CPT

## 2022-01-01 PROCEDURE — 36573 INSJ PICC RS&I 5 YR+: CPT

## 2022-01-01 PROCEDURE — 81001 URINALYSIS AUTO W/SCOPE: CPT

## 2022-01-01 PROCEDURE — 85025 COMPLETE CBC W/AUTO DIFF WBC: CPT

## 2022-01-01 PROCEDURE — 83735 ASSAY OF MAGNESIUM: CPT

## 2022-01-01 PROCEDURE — 99285 EMERGENCY DEPT VISIT HI MDM: CPT | Mod: 25

## 2022-01-01 PROCEDURE — 71045 X-RAY EXAM CHEST 1 VIEW: CPT | Mod: 26

## 2022-01-01 PROCEDURE — 99233 SBSQ HOSP IP/OBS HIGH 50: CPT

## 2022-01-01 PROCEDURE — 94640 AIRWAY INHALATION TREATMENT: CPT

## 2022-01-01 PROCEDURE — 82962 GLUCOSE BLOOD TEST: CPT

## 2022-01-01 PROCEDURE — 12345: CPT | Mod: NC

## 2022-01-01 PROCEDURE — 97110 THERAPEUTIC EXERCISES: CPT

## 2022-01-01 PROCEDURE — 77001 FLUOROGUIDE FOR VEIN DEVICE: CPT

## 2022-01-01 PROCEDURE — U0003: CPT

## 2022-01-01 PROCEDURE — 87186 SC STD MICRODIL/AGAR DIL: CPT

## 2022-01-01 PROCEDURE — 80048 BASIC METABOLIC PNL TOTAL CA: CPT

## 2022-01-01 PROCEDURE — C1751: CPT

## 2022-01-01 PROCEDURE — 76937 US GUIDE VASCULAR ACCESS: CPT | Mod: 26

## 2022-01-01 PROCEDURE — 85730 THROMBOPLASTIN TIME PARTIAL: CPT

## 2022-01-01 PROCEDURE — 80053 COMPREHEN METABOLIC PANEL: CPT

## 2022-01-01 PROCEDURE — 99232 SBSQ HOSP IP/OBS MODERATE 35: CPT

## 2022-01-01 PROCEDURE — 85027 COMPLETE CBC AUTOMATED: CPT

## 2022-01-01 PROCEDURE — 70450 CT HEAD/BRAIN W/O DYE: CPT | Mod: 26

## 2022-01-01 PROCEDURE — 82248 BILIRUBIN DIRECT: CPT

## 2022-01-01 PROCEDURE — 87150 DNA/RNA AMPLIFIED PROBE: CPT

## 2022-01-01 PROCEDURE — 74176 CT ABD & PELVIS W/O CONTRAST: CPT | Mod: 26

## 2022-01-01 PROCEDURE — 76937 US GUIDE VASCULAR ACCESS: CPT

## 2022-01-01 PROCEDURE — 97112 NEUROMUSCULAR REEDUCATION: CPT

## 2022-01-01 PROCEDURE — 85610 PROTHROMBIN TIME: CPT

## 2022-01-01 PROCEDURE — 84100 ASSAY OF PHOSPHORUS: CPT

## 2022-01-01 PROCEDURE — 71250 CT THORAX DX C-: CPT | Mod: 26

## 2022-01-01 PROCEDURE — 87040 BLOOD CULTURE FOR BACTERIA: CPT

## 2022-01-01 PROCEDURE — 93306 TTE W/DOPPLER COMPLETE: CPT

## 2022-01-01 PROCEDURE — 83880 ASSAY OF NATRIURETIC PEPTIDE: CPT

## 2022-01-01 PROCEDURE — 84443 ASSAY THYROID STIM HORMONE: CPT

## 2022-01-01 PROCEDURE — 78802 RP LOCLZJ TUM WHBDY 1 D IMG: CPT

## 2022-01-01 PROCEDURE — 84145 PROCALCITONIN (PCT): CPT

## 2022-01-01 PROCEDURE — 83615 LACTATE (LD) (LDH) ENZYME: CPT

## 2022-01-01 PROCEDURE — 93010 ELECTROCARDIOGRAM REPORT: CPT

## 2022-01-01 PROCEDURE — 83605 ASSAY OF LACTIC ACID: CPT

## 2022-01-01 PROCEDURE — 99239 HOSP IP/OBS DSCHRG MGMT >30: CPT

## 2022-01-01 PROCEDURE — 97116 GAIT TRAINING THERAPY: CPT

## 2022-01-01 PROCEDURE — 99233 SBSQ HOSP IP/OBS HIGH 50: CPT | Mod: GC

## 2022-01-01 PROCEDURE — 87086 URINE CULTURE/COLONY COUNT: CPT

## 2022-01-01 PROCEDURE — 70491 CT SOFT TISSUE NECK W/DYE: CPT | Mod: 26

## 2022-01-01 PROCEDURE — 87077 CULTURE AEROBIC IDENTIFY: CPT

## 2022-01-01 PROCEDURE — 71045 X-RAY EXAM CHEST 1 VIEW: CPT | Mod: 26,76,77

## 2022-01-01 PROCEDURE — 71045 X-RAY EXAM CHEST 1 VIEW: CPT

## 2022-01-01 PROCEDURE — 97163 PT EVAL HIGH COMPLEX 45 MIN: CPT

## 2022-01-01 PROCEDURE — 72125 CT NECK SPINE W/O DYE: CPT | Mod: 26

## 2022-01-01 PROCEDURE — 36415 COLL VENOUS BLD VENIPUNCTURE: CPT

## 2022-01-01 PROCEDURE — 87637 SARSCOV2&INF A&B&RSV AMP PRB: CPT

## 2022-01-01 PROCEDURE — A9556: CPT

## 2022-01-01 PROCEDURE — 36600 WITHDRAWAL OF ARTERIAL BLOOD: CPT

## 2022-01-01 PROCEDURE — 82803 BLOOD GASES ANY COMBINATION: CPT

## 2022-01-01 PROCEDURE — 70491 CT SOFT TISSUE NECK W/DYE: CPT

## 2022-01-01 PROCEDURE — 80061 LIPID PANEL: CPT

## 2022-01-01 PROCEDURE — 72125 CT NECK SPINE W/O DYE: CPT

## 2022-01-01 PROCEDURE — 83036 HEMOGLOBIN GLYCOSYLATED A1C: CPT

## 2022-01-01 PROCEDURE — 97162 PT EVAL MOD COMPLEX 30 MIN: CPT

## 2022-01-01 PROCEDURE — 94760 N-INVAS EAR/PLS OXIMETRY 1: CPT

## 2022-01-01 PROCEDURE — 78802 RP LOCLZJ TUM WHBDY 1 D IMG: CPT | Mod: 26

## 2022-01-01 RX ORDER — SIMETHICONE 80 MG/1
80 TABLET, CHEWABLE ORAL THREE TIMES A DAY
Refills: 0 | Status: DISCONTINUED | OUTPATIENT
Start: 2022-01-01 | End: 2022-01-01

## 2022-01-01 RX ORDER — BUDESONIDE AND FORMOTEROL FUMARATE DIHYDRATE 160; 4.5 UG/1; UG/1
2 AEROSOL RESPIRATORY (INHALATION)
Qty: 0 | Refills: 0 | DISCHARGE
Start: 2022-01-01

## 2022-01-01 RX ORDER — METOPROLOL TARTRATE 50 MG
1 TABLET ORAL
Qty: 0 | Refills: 0 | DISCHARGE
Start: 2022-01-01

## 2022-01-01 RX ORDER — ATORVASTATIN CALCIUM 80 MG/1
40 TABLET, FILM COATED ORAL AT BEDTIME
Refills: 0 | Status: DISCONTINUED | OUTPATIENT
Start: 2022-01-01 | End: 2022-01-01

## 2022-01-01 RX ORDER — CEFAZOLIN SODIUM 1 G
2000 VIAL (EA) INJECTION EVERY 8 HOURS
Refills: 0 | Status: DISCONTINUED | OUTPATIENT
Start: 2022-01-01 | End: 2022-01-01

## 2022-01-01 RX ORDER — LACTOBACILLUS ACIDOPHILUS 100MM CELL
1 CAPSULE ORAL
Refills: 0 | Status: DISCONTINUED | OUTPATIENT
Start: 2022-01-01 | End: 2022-01-01

## 2022-01-01 RX ORDER — INFLUENZA VIRUS VACCINE 15; 15; 15; 15 UG/.5ML; UG/.5ML; UG/.5ML; UG/.5ML
0.5 SUSPENSION INTRAMUSCULAR ONCE
Refills: 0 | Status: DISCONTINUED | OUTPATIENT
Start: 2022-01-01 | End: 2022-01-01

## 2022-01-01 RX ORDER — FENTANYL CITRATE 50 UG/ML
100 INJECTION INTRAVENOUS EVERY 4 HOURS
Refills: 0 | Status: DISCONTINUED | OUTPATIENT
Start: 2022-01-01 | End: 2022-01-01

## 2022-01-01 RX ORDER — DEXTROSE 50 % IN WATER 50 %
25 SYRINGE (ML) INTRAVENOUS ONCE
Refills: 0 | Status: DISCONTINUED | OUTPATIENT
Start: 2022-01-01 | End: 2023-01-01

## 2022-01-01 RX ORDER — SIMETHICONE 80 MG/1
2 TABLET, CHEWABLE ORAL
Qty: 0 | Refills: 0 | DISCHARGE

## 2022-01-01 RX ORDER — SODIUM CHLORIDE 9 MG/ML
1000 INJECTION, SOLUTION INTRAVENOUS
Refills: 0 | Status: DISCONTINUED | OUTPATIENT
Start: 2022-01-01 | End: 2022-01-01

## 2022-01-01 RX ORDER — ACETAMINOPHEN 500 MG
650 TABLET ORAL EVERY 6 HOURS
Refills: 0 | Status: DISCONTINUED | OUTPATIENT
Start: 2022-01-01 | End: 2022-01-01

## 2022-01-01 RX ORDER — ASPIRIN/CALCIUM CARB/MAGNESIUM 324 MG
81 TABLET ORAL DAILY
Refills: 0 | Status: DISCONTINUED | OUTPATIENT
Start: 2022-01-01 | End: 2022-01-01

## 2022-01-01 RX ORDER — MIDODRINE HYDROCHLORIDE 2.5 MG/1
5 TABLET ORAL ONCE
Refills: 0 | Status: DISCONTINUED | OUTPATIENT
Start: 2022-01-01 | End: 2022-01-01

## 2022-01-01 RX ORDER — SODIUM CHLORIDE 9 MG/ML
10 INJECTION INTRAMUSCULAR; INTRAVENOUS; SUBCUTANEOUS
Refills: 0 | Status: DISCONTINUED | OUTPATIENT
Start: 2022-01-01 | End: 2022-01-01

## 2022-01-01 RX ORDER — ALBUTEROL 90 UG/1
2 AEROSOL, METERED ORAL
Qty: 0 | Refills: 0 | DISCHARGE
Start: 2022-01-01

## 2022-01-01 RX ORDER — PIPERACILLIN AND TAZOBACTAM 4; .5 G/20ML; G/20ML
3.38 INJECTION, POWDER, LYOPHILIZED, FOR SOLUTION INTRAVENOUS EVERY 12 HOURS
Refills: 0 | Status: COMPLETED | OUTPATIENT
Start: 2022-01-01 | End: 2023-01-01

## 2022-01-01 RX ORDER — VANCOMYCIN HCL 1 G
1000 VIAL (EA) INTRAVENOUS ONCE
Refills: 0 | Status: COMPLETED | OUTPATIENT
Start: 2022-01-01 | End: 2022-01-01

## 2022-01-01 RX ORDER — SODIUM ZIRCONIUM CYCLOSILICATE 10 G/10G
5 POWDER, FOR SUSPENSION ORAL ONCE
Refills: 0 | Status: COMPLETED | OUTPATIENT
Start: 2022-01-01 | End: 2022-01-01

## 2022-01-01 RX ORDER — BUMETANIDE 0.25 MG/ML
2 INJECTION INTRAMUSCULAR; INTRAVENOUS EVERY 12 HOURS
Refills: 0 | Status: DISCONTINUED | OUTPATIENT
Start: 2022-01-01 | End: 2022-01-01

## 2022-01-01 RX ORDER — TRAMADOL HYDROCHLORIDE 50 MG/1
25 TABLET ORAL EVERY 6 HOURS
Refills: 0 | Status: DISCONTINUED | OUTPATIENT
Start: 2022-01-01 | End: 2022-01-01

## 2022-01-01 RX ORDER — SPIRONOLACTONE 25 MG/1
25 TABLET, FILM COATED ORAL DAILY
Refills: 0 | Status: DISCONTINUED | OUTPATIENT
Start: 2022-01-01 | End: 2022-01-01

## 2022-01-01 RX ORDER — SODIUM CHLORIDE 9 MG/ML
250 INJECTION INTRAMUSCULAR; INTRAVENOUS; SUBCUTANEOUS ONCE
Refills: 0 | Status: COMPLETED | OUTPATIENT
Start: 2022-01-01 | End: 2022-01-01

## 2022-01-01 RX ORDER — SACUBITRIL AND VALSARTAN 24; 26 MG/1; MG/1
1 TABLET, FILM COATED ORAL
Qty: 0 | Refills: 0 | DISCHARGE
Start: 2022-01-01

## 2022-01-01 RX ORDER — ONDANSETRON 8 MG/1
4 TABLET, FILM COATED ORAL EVERY 8 HOURS
Refills: 0 | Status: DISCONTINUED | OUTPATIENT
Start: 2022-01-01 | End: 2022-01-01

## 2022-01-01 RX ORDER — SODIUM CHLORIDE 9 MG/ML
500 INJECTION, SOLUTION INTRAVENOUS ONCE
Refills: 0 | Status: COMPLETED | OUTPATIENT
Start: 2022-01-01 | End: 2022-01-01

## 2022-01-01 RX ORDER — LACTULOSE 10 G/15ML
20 SOLUTION ORAL
Refills: 0 | Status: DISCONTINUED | OUTPATIENT
Start: 2022-01-01 | End: 2023-01-01

## 2022-01-01 RX ORDER — DEXAMETHASONE 0.5 MG/5ML
6 ELIXIR ORAL DAILY
Refills: 0 | Status: ACTIVE | OUTPATIENT
Start: 2022-01-01 | End: 2022-01-01

## 2022-01-01 RX ORDER — LACTOBACILLUS ACIDOPHILUS 100MM CELL
2 CAPSULE ORAL
Qty: 0 | Refills: 0 | DISCHARGE

## 2022-01-01 RX ORDER — GABAPENTIN 400 MG/1
100 CAPSULE ORAL THREE TIMES A DAY
Refills: 0 | Status: DISCONTINUED | OUTPATIENT
Start: 2022-01-01 | End: 2022-01-01

## 2022-01-01 RX ORDER — GLUCAGON INJECTION, SOLUTION 0.5 MG/.1ML
1 INJECTION, SOLUTION SUBCUTANEOUS ONCE
Refills: 0 | Status: DISCONTINUED | OUTPATIENT
Start: 2022-01-01 | End: 2022-01-12

## 2022-01-01 RX ORDER — METOPROLOL TARTRATE 50 MG
100 TABLET ORAL DAILY
Refills: 0 | Status: DISCONTINUED | OUTPATIENT
Start: 2022-01-01 | End: 2022-01-01

## 2022-01-01 RX ORDER — ETOMIDATE 2 MG/ML
10 INJECTION INTRAVENOUS ONCE
Refills: 0 | Status: COMPLETED | OUTPATIENT
Start: 2022-01-01 | End: 2022-01-01

## 2022-01-01 RX ORDER — SODIUM CHLORIDE 9 MG/ML
1000 INJECTION, SOLUTION INTRAVENOUS
Refills: 0 | Status: DISCONTINUED | OUTPATIENT
Start: 2022-01-01 | End: 2022-01-12

## 2022-01-01 RX ORDER — CEFAZOLIN SODIUM 1 G
1000 VIAL (EA) INJECTION ONCE
Refills: 0 | Status: COMPLETED | OUTPATIENT
Start: 2022-01-01 | End: 2022-01-01

## 2022-01-01 RX ORDER — APIXABAN 2.5 MG/1
5 TABLET, FILM COATED ORAL EVERY 12 HOURS
Refills: 0 | Status: DISCONTINUED | OUTPATIENT
Start: 2022-01-01 | End: 2022-01-01

## 2022-01-01 RX ORDER — DEXTROSE 50 % IN WATER 50 %
25 SYRINGE (ML) INTRAVENOUS ONCE
Refills: 0 | Status: DISCONTINUED | OUTPATIENT
Start: 2022-01-01 | End: 2022-01-12

## 2022-01-01 RX ORDER — MIDODRINE HYDROCHLORIDE 2.5 MG/1
5 TABLET ORAL ONCE
Refills: 0 | Status: COMPLETED | OUTPATIENT
Start: 2022-01-01 | End: 2022-01-01

## 2022-01-01 RX ORDER — ASPIRIN/CALCIUM CARB/MAGNESIUM 324 MG
81 TABLET ORAL DAILY
Refills: 0 | Status: DISCONTINUED | OUTPATIENT
Start: 2022-01-01 | End: 2023-01-01

## 2022-01-01 RX ORDER — PANTOPRAZOLE SODIUM 20 MG/1
40 TABLET, DELAYED RELEASE ORAL
Refills: 0 | Status: DISCONTINUED | OUTPATIENT
Start: 2022-01-01 | End: 2022-01-01

## 2022-01-01 RX ORDER — AMIODARONE HYDROCHLORIDE 400 MG/1
200 TABLET ORAL DAILY
Refills: 0 | Status: DISCONTINUED | OUTPATIENT
Start: 2022-01-01 | End: 2022-01-01

## 2022-01-01 RX ORDER — LANOLIN ALCOHOL/MO/W.PET/CERES
1 CREAM (GRAM) TOPICAL
Qty: 0 | Refills: 0 | DISCHARGE
Start: 2022-01-01

## 2022-01-01 RX ORDER — BUDESONIDE AND FORMOTEROL FUMARATE DIHYDRATE 160; 4.5 UG/1; UG/1
2 AEROSOL RESPIRATORY (INHALATION)
Refills: 0 | Status: DISCONTINUED | OUTPATIENT
Start: 2022-01-01 | End: 2022-01-01

## 2022-01-01 RX ORDER — MULTIVIT-MIN/FERROUS GLUCONATE 9 MG/15 ML
1 LIQUID (ML) ORAL
Qty: 0 | Refills: 0 | DISCHARGE
Start: 2022-01-01

## 2022-01-01 RX ORDER — LIDOCAINE 4 G/100G
1 CREAM TOPICAL DAILY
Refills: 0 | Status: DISCONTINUED | OUTPATIENT
Start: 2022-01-01 | End: 2022-01-01

## 2022-01-01 RX ORDER — CHLORHEXIDINE GLUCONATE 213 G/1000ML
15 SOLUTION TOPICAL EVERY 12 HOURS
Refills: 0 | Status: DISCONTINUED | OUTPATIENT
Start: 2022-01-01 | End: 2023-01-01

## 2022-01-01 RX ORDER — PANTOPRAZOLE SODIUM 20 MG/1
40 TABLET, DELAYED RELEASE ORAL DAILY
Refills: 0 | Status: DISCONTINUED | OUTPATIENT
Start: 2022-01-01 | End: 2023-01-01

## 2022-01-01 RX ORDER — INSULIN LISPRO 100/ML
VIAL (ML) SUBCUTANEOUS EVERY 6 HOURS
Refills: 0 | Status: DISCONTINUED | OUTPATIENT
Start: 2022-01-01 | End: 2023-01-01

## 2022-01-01 RX ORDER — CEFEPIME 1 G/1
2000 INJECTION, POWDER, FOR SOLUTION INTRAMUSCULAR; INTRAVENOUS ONCE
Refills: 0 | Status: COMPLETED | OUTPATIENT
Start: 2022-01-01 | End: 2022-01-01

## 2022-01-01 RX ORDER — DEXTROSE 50 % IN WATER 50 %
15 SYRINGE (ML) INTRAVENOUS ONCE
Refills: 0 | Status: DISCONTINUED | OUTPATIENT
Start: 2022-01-01 | End: 2023-01-01

## 2022-01-01 RX ORDER — LANOLIN ALCOHOL/MO/W.PET/CERES
3 CREAM (GRAM) TOPICAL AT BEDTIME
Refills: 0 | Status: DISCONTINUED | OUTPATIENT
Start: 2022-01-01 | End: 2022-01-01

## 2022-01-01 RX ORDER — CEFAZOLIN SODIUM 1 G
2 VIAL (EA) INJECTION
Qty: 0 | Refills: 0 | DISCHARGE
Start: 2022-01-01

## 2022-01-01 RX ORDER — SACUBITRIL AND VALSARTAN 24; 26 MG/1; MG/1
1 TABLET, FILM COATED ORAL
Refills: 0 | Status: DISCONTINUED | OUTPATIENT
Start: 2022-01-01 | End: 2022-01-01

## 2022-01-01 RX ORDER — SUCCINYLCHOLINE CHLORIDE 100 MG/5ML
100 SYRINGE (ML) INTRAVENOUS ONCE
Refills: 0 | Status: COMPLETED | OUTPATIENT
Start: 2022-01-01 | End: 2022-01-01

## 2022-01-01 RX ORDER — DEXTROSE 50 % IN WATER 50 %
12.5 SYRINGE (ML) INTRAVENOUS ONCE
Refills: 0 | Status: DISCONTINUED | OUTPATIENT
Start: 2022-01-01 | End: 2023-01-01

## 2022-01-01 RX ORDER — ASCORBIC ACID 60 MG
1 TABLET,CHEWABLE ORAL
Qty: 0 | Refills: 0 | DISCHARGE
Start: 2022-01-01

## 2022-01-01 RX ORDER — BUDESONIDE, MICRONIZED 100 %
0.5 POWDER (GRAM) MISCELLANEOUS
Refills: 0 | Status: DISCONTINUED | OUTPATIENT
Start: 2022-01-01 | End: 2022-01-01

## 2022-01-01 RX ORDER — ACETAMINOPHEN 500 MG
650 TABLET ORAL ONCE
Refills: 0 | Status: COMPLETED | OUTPATIENT
Start: 2022-01-01 | End: 2022-01-01

## 2022-01-01 RX ORDER — BUDESONIDE AND FORMOTEROL FUMARATE DIHYDRATE 160; 4.5 UG/1; UG/1
2 AEROSOL RESPIRATORY (INHALATION)
Qty: 0 | Refills: 0 | DISCHARGE

## 2022-01-01 RX ORDER — SPIRONOLACTONE 25 MG/1
1 TABLET, FILM COATED ORAL
Qty: 0 | Refills: 0 | DISCHARGE
Start: 2022-01-01

## 2022-01-01 RX ORDER — GLUCAGON INJECTION, SOLUTION 0.5 MG/.1ML
1 INJECTION, SOLUTION SUBCUTANEOUS ONCE
Refills: 0 | Status: DISCONTINUED | OUTPATIENT
Start: 2022-01-01 | End: 2023-01-01

## 2022-01-01 RX ORDER — IPRATROPIUM/ALBUTEROL SULFATE 18-103MCG
1 AEROSOL WITH ADAPTER (GRAM) INHALATION EVERY 6 HOURS
Refills: 0 | Status: DISCONTINUED | OUTPATIENT
Start: 2022-01-01 | End: 2022-01-01

## 2022-01-01 RX ORDER — METOPROLOL TARTRATE 50 MG
25 TABLET ORAL DAILY
Refills: 0 | Status: DISCONTINUED | OUTPATIENT
Start: 2022-01-01 | End: 2022-01-01

## 2022-01-01 RX ORDER — MIDODRINE HYDROCHLORIDE 2.5 MG/1
1 TABLET ORAL
Qty: 0 | Refills: 0 | DISCHARGE
Start: 2022-01-01

## 2022-01-01 RX ORDER — IPRATROPIUM/ALBUTEROL SULFATE 18-103MCG
3 AEROSOL WITH ADAPTER (GRAM) INHALATION
Qty: 0 | Refills: 0 | DISCHARGE

## 2022-01-01 RX ORDER — CEFAZOLIN SODIUM 1 G
VIAL (EA) INJECTION
Refills: 0 | Status: DISCONTINUED | OUTPATIENT
Start: 2022-01-01 | End: 2022-01-01

## 2022-01-01 RX ORDER — SODIUM CHLORIDE 9 MG/ML
1000 INJECTION, SOLUTION INTRAVENOUS
Refills: 0 | Status: DISCONTINUED | OUTPATIENT
Start: 2022-01-01 | End: 2023-01-01

## 2022-01-01 RX ORDER — APIXABAN 2.5 MG/1
1 TABLET, FILM COATED ORAL
Qty: 0 | Refills: 0 | DISCHARGE
Start: 2022-01-01

## 2022-01-01 RX ORDER — HEPARIN SODIUM 5000 [USP'U]/ML
5000 INJECTION INTRAVENOUS; SUBCUTANEOUS EVERY 12 HOURS
Refills: 0 | Status: DISCONTINUED | OUTPATIENT
Start: 2022-01-01 | End: 2023-01-01

## 2022-01-01 RX ORDER — DEXTROSE 50 % IN WATER 50 %
12.5 SYRINGE (ML) INTRAVENOUS ONCE
Refills: 0 | Status: DISCONTINUED | OUTPATIENT
Start: 2022-01-01 | End: 2022-01-12

## 2022-01-01 RX ORDER — IPRATROPIUM/ALBUTEROL SULFATE 18-103MCG
3 AEROSOL WITH ADAPTER (GRAM) INHALATION ONCE
Refills: 0 | Status: COMPLETED | OUTPATIENT
Start: 2022-01-01 | End: 2022-01-01

## 2022-01-01 RX ORDER — ALBUTEROL 90 UG/1
2 AEROSOL, METERED ORAL
Qty: 0 | Refills: 0 | DISCHARGE

## 2022-01-01 RX ORDER — GABAPENTIN 400 MG/1
100 CAPSULE ORAL ONCE
Refills: 0 | Status: COMPLETED | OUTPATIENT
Start: 2022-01-01 | End: 2022-01-01

## 2022-01-01 RX ORDER — BUMETANIDE 0.25 MG/ML
2 INJECTION INTRAMUSCULAR; INTRAVENOUS
Refills: 0 | Status: DISCONTINUED | OUTPATIENT
Start: 2022-01-01 | End: 2022-01-01

## 2022-01-01 RX ORDER — BUMETANIDE 0.25 MG/ML
1 INJECTION INTRAMUSCULAR; INTRAVENOUS
Qty: 0 | Refills: 0 | DISCHARGE
Start: 2022-01-01

## 2022-01-01 RX ORDER — ASCORBIC ACID 60 MG
500 TABLET,CHEWABLE ORAL DAILY
Refills: 0 | Status: CANCELLED | OUTPATIENT
Start: 2022-01-01 | End: 2022-01-01

## 2022-01-01 RX ORDER — FUROSEMIDE 40 MG
40 TABLET ORAL ONCE
Refills: 0 | Status: COMPLETED | OUTPATIENT
Start: 2022-01-01 | End: 2022-01-01

## 2022-01-01 RX ORDER — ACETAMINOPHEN 500 MG
2 TABLET ORAL
Qty: 0 | Refills: 0 | DISCHARGE
Start: 2022-01-01

## 2022-01-01 RX ORDER — AMIODARONE HYDROCHLORIDE 400 MG/1
200 TABLET ORAL DAILY
Refills: 0 | Status: DISCONTINUED | OUTPATIENT
Start: 2022-01-01 | End: 2023-01-01

## 2022-01-01 RX ORDER — MUPIROCIN 20 MG/G
1 OINTMENT TOPICAL
Refills: 0 | Status: DISCONTINUED | OUTPATIENT
Start: 2022-01-01 | End: 2022-01-01

## 2022-01-01 RX ORDER — PANTOPRAZOLE SODIUM 20 MG/1
1 TABLET, DELAYED RELEASE ORAL
Qty: 0 | Refills: 0 | DISCHARGE
Start: 2022-01-01

## 2022-01-01 RX ORDER — INSULIN LISPRO 100/ML
VIAL (ML) SUBCUTANEOUS
Refills: 0 | Status: DISCONTINUED | OUTPATIENT
Start: 2022-01-01 | End: 2022-01-12

## 2022-01-01 RX ORDER — BUMETANIDE 0.25 MG/ML
4 INJECTION INTRAMUSCULAR; INTRAVENOUS EVERY 12 HOURS
Refills: 0 | Status: DISCONTINUED | OUTPATIENT
Start: 2022-01-01 | End: 2022-01-02

## 2022-01-01 RX ORDER — MULTIVIT-MIN/FERROUS GLUCONATE 9 MG/15 ML
1 LIQUID (ML) ORAL DAILY
Refills: 0 | Status: CANCELLED | OUTPATIENT
Start: 2022-01-01 | End: 2022-01-01

## 2022-01-01 RX ORDER — IPRATROPIUM BROMIDE 0.2 MG/ML
1 SOLUTION, NON-ORAL INHALATION EVERY 6 HOURS
Refills: 0 | Status: DISCONTINUED | OUTPATIENT
Start: 2022-01-01 | End: 2023-01-01

## 2022-01-01 RX ORDER — MUPIROCIN 20 MG/G
1 OINTMENT TOPICAL
Refills: 0 | Status: COMPLETED | OUTPATIENT
Start: 2022-01-01 | End: 2023-01-01

## 2022-01-01 RX ORDER — PIPERACILLIN AND TAZOBACTAM 4; .5 G/20ML; G/20ML
3.38 INJECTION, POWDER, LYOPHILIZED, FOR SOLUTION INTRAVENOUS EVERY 8 HOURS
Refills: 0 | Status: DISCONTINUED | OUTPATIENT
Start: 2022-01-01 | End: 2022-01-01

## 2022-01-01 RX ORDER — FUROSEMIDE 40 MG
1 TABLET ORAL
Qty: 0 | Refills: 0 | DISCHARGE

## 2022-01-01 RX ORDER — SODIUM CHLORIDE 9 MG/ML
1000 INJECTION INTRAMUSCULAR; INTRAVENOUS; SUBCUTANEOUS
Refills: 0 | Status: DISCONTINUED | OUTPATIENT
Start: 2022-01-01 | End: 2022-01-01

## 2022-01-01 RX ORDER — PETROLATUM,WHITE
0 JELLY (GRAM) TOPICAL
Qty: 0 | Refills: 0 | DISCHARGE

## 2022-01-01 RX ORDER — ALBUTEROL 90 UG/1
2 AEROSOL, METERED ORAL EVERY 6 HOURS
Refills: 0 | Status: DISCONTINUED | OUTPATIENT
Start: 2022-01-01 | End: 2023-01-01

## 2022-01-01 RX ORDER — DEXTROSE 50 % IN WATER 50 %
15 SYRINGE (ML) INTRAVENOUS ONCE
Refills: 0 | Status: DISCONTINUED | OUTPATIENT
Start: 2022-01-01 | End: 2022-01-12

## 2022-01-01 RX ORDER — GABAPENTIN 400 MG/1
1 CAPSULE ORAL
Qty: 0 | Refills: 0 | DISCHARGE
Start: 2022-01-01

## 2022-01-01 RX ORDER — CEFAZOLIN SODIUM 1 G
1000 VIAL (EA) INJECTION EVERY 8 HOURS
Refills: 0 | Status: DISCONTINUED | OUTPATIENT
Start: 2022-01-01 | End: 2022-01-01

## 2022-01-01 RX ORDER — ZINC SULFATE TAB 220 MG (50 MG ZINC EQUIVALENT) 220 (50 ZN) MG
220 TAB ORAL DAILY
Refills: 0 | Status: CANCELLED | OUTPATIENT
Start: 2022-01-01 | End: 2022-01-01

## 2022-01-01 RX ORDER — ALBUTEROL 90 UG/1
2 AEROSOL, METERED ORAL EVERY 6 HOURS
Refills: 0 | Status: DISCONTINUED | OUTPATIENT
Start: 2022-01-01 | End: 2022-01-01

## 2022-01-01 RX ORDER — DEXMEDETOMIDINE HYDROCHLORIDE IN 0.9% SODIUM CHLORIDE 4 UG/ML
0.2 INJECTION INTRAVENOUS
Qty: 200 | Refills: 0 | Status: DISCONTINUED | OUTPATIENT
Start: 2022-01-01 | End: 2023-01-01

## 2022-01-01 RX ORDER — PIPERACILLIN AND TAZOBACTAM 4; .5 G/20ML; G/20ML
3.38 INJECTION, POWDER, LYOPHILIZED, FOR SOLUTION INTRAVENOUS ONCE
Refills: 0 | Status: COMPLETED | OUTPATIENT
Start: 2022-01-01 | End: 2022-01-01

## 2022-01-01 RX ORDER — CHLORHEXIDINE GLUCONATE 213 G/1000ML
1 SOLUTION TOPICAL
Refills: 0 | Status: DISCONTINUED | OUTPATIENT
Start: 2022-01-01 | End: 2022-01-01

## 2022-01-01 RX ADMIN — PIPERACILLIN AND TAZOBACTAM 25 GRAM(S): 4; .5 INJECTION, POWDER, LYOPHILIZED, FOR SOLUTION INTRAVENOUS at 05:51

## 2022-01-01 RX ADMIN — SPIRONOLACTONE 25 MILLIGRAM(S): 25 TABLET, FILM COATED ORAL at 05:08

## 2022-01-01 RX ADMIN — Medication 40 MILLIGRAM(S): at 05:52

## 2022-01-01 RX ADMIN — Medication 1: at 17:20

## 2022-01-01 RX ADMIN — APIXABAN 5 MILLIGRAM(S): 2.5 TABLET, FILM COATED ORAL at 18:25

## 2022-01-01 RX ADMIN — Medication 40 MILLIGRAM(S): at 14:05

## 2022-01-01 RX ADMIN — PANTOPRAZOLE SODIUM 40 MILLIGRAM(S): 20 TABLET, DELAYED RELEASE ORAL at 12:35

## 2022-01-01 RX ADMIN — BUMETANIDE 2 MILLIGRAM(S): 0.25 INJECTION INTRAMUSCULAR; INTRAVENOUS at 05:43

## 2022-01-01 RX ADMIN — Medication 81 MILLIGRAM(S): at 11:35

## 2022-01-01 RX ADMIN — PIPERACILLIN AND TAZOBACTAM 25 GRAM(S): 4; .5 INJECTION, POWDER, LYOPHILIZED, FOR SOLUTION INTRAVENOUS at 05:15

## 2022-01-01 RX ADMIN — Medication 1 PUFF(S): at 20:35

## 2022-01-01 RX ADMIN — CHLORHEXIDINE GLUCONATE 15 MILLILITER(S): 213 SOLUTION TOPICAL at 17:19

## 2022-01-01 RX ADMIN — Medication 1 PUFF(S): at 19:55

## 2022-01-01 RX ADMIN — PANTOPRAZOLE SODIUM 40 MILLIGRAM(S): 20 TABLET, DELAYED RELEASE ORAL at 05:43

## 2022-01-01 RX ADMIN — ETOMIDATE 10 MILLIGRAM(S): 2 INJECTION INTRAVENOUS at 11:37

## 2022-01-01 RX ADMIN — Medication 1 PUFF(S): at 08:34

## 2022-01-01 RX ADMIN — ALBUTEROL 2 PUFF(S): 90 AEROSOL, METERED ORAL at 12:45

## 2022-01-01 RX ADMIN — SACUBITRIL AND VALSARTAN 1 TABLET(S): 24; 26 TABLET, FILM COATED ORAL at 17:31

## 2022-01-01 RX ADMIN — Medication 1 TABLET(S): at 06:03

## 2022-01-01 RX ADMIN — Medication 1 PUFF(S): at 13:15

## 2022-01-01 RX ADMIN — HEPARIN SODIUM 5000 UNIT(S): 5000 INJECTION INTRAVENOUS; SUBCUTANEOUS at 05:37

## 2022-01-01 RX ADMIN — SACUBITRIL AND VALSARTAN 1 TABLET(S): 24; 26 TABLET, FILM COATED ORAL at 05:15

## 2022-01-01 RX ADMIN — LIDOCAINE 1 PATCH: 4 CREAM TOPICAL at 12:55

## 2022-01-01 RX ADMIN — Medication 81 MILLIGRAM(S): at 13:20

## 2022-01-01 RX ADMIN — Medication 650 MILLIGRAM(S): at 10:44

## 2022-01-01 RX ADMIN — Medication 100 MILLIGRAM(S): at 21:37

## 2022-01-01 RX ADMIN — ATORVASTATIN CALCIUM 40 MILLIGRAM(S): 80 TABLET, FILM COATED ORAL at 21:55

## 2022-01-01 RX ADMIN — CHLORHEXIDINE GLUCONATE 1 APPLICATION(S): 213 SOLUTION TOPICAL at 05:29

## 2022-01-01 RX ADMIN — ATORVASTATIN CALCIUM 40 MILLIGRAM(S): 80 TABLET, FILM COATED ORAL at 22:42

## 2022-01-01 RX ADMIN — Medication 25 MILLIGRAM(S): at 05:53

## 2022-01-01 RX ADMIN — Medication 100 MILLIGRAM(S): at 13:15

## 2022-01-01 RX ADMIN — GABAPENTIN 100 MILLIGRAM(S): 400 CAPSULE ORAL at 05:41

## 2022-01-01 RX ADMIN — PANTOPRAZOLE SODIUM 40 MILLIGRAM(S): 20 TABLET, DELAYED RELEASE ORAL at 05:28

## 2022-01-01 RX ADMIN — Medication 25 MILLIGRAM(S): at 05:11

## 2022-01-01 RX ADMIN — SODIUM ZIRCONIUM CYCLOSILICATE 5 GRAM(S): 10 POWDER, FOR SUSPENSION ORAL at 01:53

## 2022-01-01 RX ADMIN — Medication 1 PUFF(S): at 02:14

## 2022-01-01 RX ADMIN — SODIUM CHLORIDE 75 MILLILITER(S): 9 INJECTION, SOLUTION INTRAVENOUS at 02:34

## 2022-01-01 RX ADMIN — Medication 1 TABLET(S): at 18:30

## 2022-01-01 RX ADMIN — Medication 1: at 08:36

## 2022-01-01 RX ADMIN — MIDODRINE HYDROCHLORIDE 5 MILLIGRAM(S): 2.5 TABLET ORAL at 19:49

## 2022-01-01 RX ADMIN — PIPERACILLIN AND TAZOBACTAM 25 GRAM(S): 4; .5 INJECTION, POWDER, LYOPHILIZED, FOR SOLUTION INTRAVENOUS at 05:25

## 2022-01-01 RX ADMIN — ATORVASTATIN CALCIUM 40 MILLIGRAM(S): 80 TABLET, FILM COATED ORAL at 23:02

## 2022-01-01 RX ADMIN — ATORVASTATIN CALCIUM 40 MILLIGRAM(S): 80 TABLET, FILM COATED ORAL at 22:08

## 2022-01-01 RX ADMIN — PIPERACILLIN AND TAZOBACTAM 25 GRAM(S): 4; .5 INJECTION, POWDER, LYOPHILIZED, FOR SOLUTION INTRAVENOUS at 00:42

## 2022-01-01 RX ADMIN — Medication 100 MILLIGRAM(S): at 06:04

## 2022-01-01 RX ADMIN — PANTOPRAZOLE SODIUM 40 MILLIGRAM(S): 20 TABLET, DELAYED RELEASE ORAL at 05:30

## 2022-01-01 RX ADMIN — Medication 81 MILLIGRAM(S): at 12:56

## 2022-01-01 RX ADMIN — AMIODARONE HYDROCHLORIDE 200 MILLIGRAM(S): 400 TABLET ORAL at 05:07

## 2022-01-01 RX ADMIN — Medication 1 TABLET(S): at 05:17

## 2022-01-01 RX ADMIN — SPIRONOLACTONE 25 MILLIGRAM(S): 25 TABLET, FILM COATED ORAL at 05:33

## 2022-01-01 RX ADMIN — SPIRONOLACTONE 25 MILLIGRAM(S): 25 TABLET, FILM COATED ORAL at 05:15

## 2022-01-01 RX ADMIN — Medication 81 MILLIGRAM(S): at 11:33

## 2022-01-01 RX ADMIN — LIDOCAINE 1 PATCH: 4 CREAM TOPICAL at 13:20

## 2022-01-01 RX ADMIN — SPIRONOLACTONE 25 MILLIGRAM(S): 25 TABLET, FILM COATED ORAL at 05:43

## 2022-01-01 RX ADMIN — SPIRONOLACTONE 25 MILLIGRAM(S): 25 TABLET, FILM COATED ORAL at 05:09

## 2022-01-01 RX ADMIN — Medication 100 MILLIGRAM(S): at 21:15

## 2022-01-01 RX ADMIN — BUMETANIDE 2 MILLIGRAM(S): 0.25 INJECTION INTRAMUSCULAR; INTRAVENOUS at 05:30

## 2022-01-01 RX ADMIN — PANTOPRAZOLE SODIUM 40 MILLIGRAM(S): 20 TABLET, DELAYED RELEASE ORAL at 12:09

## 2022-01-01 RX ADMIN — LIDOCAINE 1 PATCH: 4 CREAM TOPICAL at 11:06

## 2022-01-01 RX ADMIN — Medication 0.5 MILLIGRAM(S): at 20:37

## 2022-01-01 RX ADMIN — AMIODARONE HYDROCHLORIDE 200 MILLIGRAM(S): 400 TABLET ORAL at 05:30

## 2022-01-01 RX ADMIN — Medication 0.5 MILLIGRAM(S): at 08:01

## 2022-01-01 RX ADMIN — Medication 1 PUFF(S): at 13:30

## 2022-01-01 RX ADMIN — Medication 100 MILLIGRAM(S): at 22:50

## 2022-01-01 RX ADMIN — GABAPENTIN 100 MILLIGRAM(S): 400 CAPSULE ORAL at 22:05

## 2022-01-01 RX ADMIN — APIXABAN 5 MILLIGRAM(S): 2.5 TABLET, FILM COATED ORAL at 18:48

## 2022-01-01 RX ADMIN — Medication 100 MILLIGRAM(S): at 13:01

## 2022-01-01 RX ADMIN — ATORVASTATIN CALCIUM 40 MILLIGRAM(S): 80 TABLET, FILM COATED ORAL at 21:54

## 2022-01-01 RX ADMIN — APIXABAN 5 MILLIGRAM(S): 2.5 TABLET, FILM COATED ORAL at 05:43

## 2022-01-01 RX ADMIN — SODIUM CHLORIDE 250 MILLILITER(S): 9 INJECTION INTRAMUSCULAR; INTRAVENOUS; SUBCUTANEOUS at 14:44

## 2022-01-01 RX ADMIN — Medication 1 TABLET(S): at 17:22

## 2022-01-01 RX ADMIN — Medication 650 MILLIGRAM(S): at 21:30

## 2022-01-01 RX ADMIN — Medication 100 MILLIGRAM(S): at 15:20

## 2022-01-01 RX ADMIN — Medication 81 MILLIGRAM(S): at 11:16

## 2022-01-01 RX ADMIN — GABAPENTIN 100 MILLIGRAM(S): 400 CAPSULE ORAL at 21:15

## 2022-01-01 RX ADMIN — GABAPENTIN 100 MILLIGRAM(S): 400 CAPSULE ORAL at 21:55

## 2022-01-01 RX ADMIN — APIXABAN 5 MILLIGRAM(S): 2.5 TABLET, FILM COATED ORAL at 05:41

## 2022-01-01 RX ADMIN — SODIUM CHLORIDE 75 MILLILITER(S): 9 INJECTION, SOLUTION INTRAVENOUS at 01:05

## 2022-01-01 RX ADMIN — Medication 650 MILLIGRAM(S): at 14:58

## 2022-01-01 RX ADMIN — AMIODARONE HYDROCHLORIDE 200 MILLIGRAM(S): 400 TABLET ORAL at 06:05

## 2022-01-01 RX ADMIN — ALBUTEROL 2 PUFF(S): 90 AEROSOL, METERED ORAL at 12:31

## 2022-01-01 RX ADMIN — LACTULOSE 20 GRAM(S): 10 SOLUTION ORAL at 05:37

## 2022-01-01 RX ADMIN — APIXABAN 5 MILLIGRAM(S): 2.5 TABLET, FILM COATED ORAL at 17:19

## 2022-01-01 RX ADMIN — AMIODARONE HYDROCHLORIDE 200 MILLIGRAM(S): 400 TABLET ORAL at 05:38

## 2022-01-01 RX ADMIN — APIXABAN 5 MILLIGRAM(S): 2.5 TABLET, FILM COATED ORAL at 06:04

## 2022-01-01 RX ADMIN — AMIODARONE HYDROCHLORIDE 200 MILLIGRAM(S): 400 TABLET ORAL at 06:03

## 2022-01-01 RX ADMIN — Medication 40 MILLIGRAM(S): at 12:36

## 2022-01-01 RX ADMIN — AMIODARONE HYDROCHLORIDE 200 MILLIGRAM(S): 400 TABLET ORAL at 05:44

## 2022-01-01 RX ADMIN — APIXABAN 5 MILLIGRAM(S): 2.5 TABLET, FILM COATED ORAL at 05:33

## 2022-01-01 RX ADMIN — FENTANYL CITRATE 100 MICROGRAM(S): 50 INJECTION INTRAVENOUS at 14:43

## 2022-01-01 RX ADMIN — SODIUM CHLORIDE 75 MILLILITER(S): 9 INJECTION, SOLUTION INTRAVENOUS at 15:22

## 2022-01-01 RX ADMIN — Medication 81 MILLIGRAM(S): at 13:27

## 2022-01-01 RX ADMIN — SODIUM CHLORIDE 75 MILLILITER(S): 9 INJECTION, SOLUTION INTRAVENOUS at 18:16

## 2022-01-01 RX ADMIN — Medication 100 MILLIGRAM(S): at 05:44

## 2022-01-01 RX ADMIN — BUMETANIDE 2 MILLIGRAM(S): 0.25 INJECTION INTRAMUSCULAR; INTRAVENOUS at 18:05

## 2022-01-01 RX ADMIN — Medication 81 MILLIGRAM(S): at 12:10

## 2022-01-01 RX ADMIN — Medication 25 MILLIGRAM(S): at 05:43

## 2022-01-01 RX ADMIN — GABAPENTIN 100 MILLIGRAM(S): 400 CAPSULE ORAL at 13:11

## 2022-01-01 RX ADMIN — Medication 81 MILLIGRAM(S): at 12:47

## 2022-01-01 RX ADMIN — Medication 2: at 00:02

## 2022-01-01 RX ADMIN — Medication 100 MILLIGRAM(S): at 17:33

## 2022-01-01 RX ADMIN — Medication 40 MILLIGRAM(S): at 05:10

## 2022-01-01 RX ADMIN — HEPARIN SODIUM 5000 UNIT(S): 5000 INJECTION INTRAVENOUS; SUBCUTANEOUS at 17:40

## 2022-01-01 RX ADMIN — CHLORHEXIDINE GLUCONATE 1 APPLICATION(S): 213 SOLUTION TOPICAL at 05:39

## 2022-01-01 RX ADMIN — GABAPENTIN 100 MILLIGRAM(S): 400 CAPSULE ORAL at 13:01

## 2022-01-01 RX ADMIN — ALBUTEROL 2 PUFF(S): 90 AEROSOL, METERED ORAL at 20:24

## 2022-01-01 RX ADMIN — LIDOCAINE 1 PATCH: 4 CREAM TOPICAL at 19:31

## 2022-01-01 RX ADMIN — LIDOCAINE 1 PATCH: 4 CREAM TOPICAL at 13:28

## 2022-01-01 RX ADMIN — Medication 100 MILLIGRAM(S): at 06:32

## 2022-01-01 RX ADMIN — Medication 1: at 01:00

## 2022-01-01 RX ADMIN — PANTOPRAZOLE SODIUM 40 MILLIGRAM(S): 20 TABLET, DELAYED RELEASE ORAL at 12:19

## 2022-01-01 RX ADMIN — APIXABAN 5 MILLIGRAM(S): 2.5 TABLET, FILM COATED ORAL at 05:17

## 2022-01-01 RX ADMIN — GABAPENTIN 100 MILLIGRAM(S): 400 CAPSULE ORAL at 13:20

## 2022-01-01 RX ADMIN — ATORVASTATIN CALCIUM 40 MILLIGRAM(S): 80 TABLET, FILM COATED ORAL at 21:36

## 2022-01-01 RX ADMIN — FENTANYL CITRATE 100 MICROGRAM(S): 50 INJECTION INTRAVENOUS at 17:19

## 2022-01-01 RX ADMIN — APIXABAN 5 MILLIGRAM(S): 2.5 TABLET, FILM COATED ORAL at 17:02

## 2022-01-01 RX ADMIN — CHLORHEXIDINE GLUCONATE 15 MILLILITER(S): 213 SOLUTION TOPICAL at 17:15

## 2022-01-01 RX ADMIN — Medication 100 MILLIGRAM(S): at 05:09

## 2022-01-01 RX ADMIN — GABAPENTIN 100 MILLIGRAM(S): 400 CAPSULE ORAL at 15:52

## 2022-01-01 RX ADMIN — ALBUTEROL 2 PUFF(S): 90 AEROSOL, METERED ORAL at 01:35

## 2022-01-01 RX ADMIN — Medication 6 MILLIGRAM(S): at 05:43

## 2022-01-01 RX ADMIN — SODIUM CHLORIDE 100 MILLILITER(S): 9 INJECTION, SOLUTION INTRAVENOUS at 05:51

## 2022-01-01 RX ADMIN — Medication 40 MILLIGRAM(S): at 21:41

## 2022-01-01 RX ADMIN — Medication 250 MILLIGRAM(S): at 13:34

## 2022-01-01 RX ADMIN — SACUBITRIL AND VALSARTAN 1 TABLET(S): 24; 26 TABLET, FILM COATED ORAL at 05:16

## 2022-01-01 RX ADMIN — CHLORHEXIDINE GLUCONATE 15 MILLILITER(S): 213 SOLUTION TOPICAL at 06:49

## 2022-01-01 RX ADMIN — Medication 40 MILLIGRAM(S): at 16:26

## 2022-01-01 RX ADMIN — BUMETANIDE 2 MILLIGRAM(S): 0.25 INJECTION INTRAMUSCULAR; INTRAVENOUS at 18:48

## 2022-01-01 RX ADMIN — PIPERACILLIN AND TAZOBACTAM 25 GRAM(S): 4; .5 INJECTION, POWDER, LYOPHILIZED, FOR SOLUTION INTRAVENOUS at 12:10

## 2022-01-01 RX ADMIN — Medication 3 MILLIGRAM(S): at 00:34

## 2022-01-01 RX ADMIN — Medication 1 PUFF(S): at 08:03

## 2022-01-01 RX ADMIN — ALBUTEROL 2 PUFF(S): 90 AEROSOL, METERED ORAL at 19:39

## 2022-01-01 RX ADMIN — GABAPENTIN 100 MILLIGRAM(S): 400 CAPSULE ORAL at 07:28

## 2022-01-01 RX ADMIN — SACUBITRIL AND VALSARTAN 1 TABLET(S): 24; 26 TABLET, FILM COATED ORAL at 05:28

## 2022-01-01 RX ADMIN — PIPERACILLIN AND TAZOBACTAM 25 GRAM(S): 4; .5 INJECTION, POWDER, LYOPHILIZED, FOR SOLUTION INTRAVENOUS at 00:59

## 2022-01-01 RX ADMIN — Medication 0.5 MILLIGRAM(S): at 08:47

## 2022-01-01 RX ADMIN — LIDOCAINE 1 PATCH: 4 CREAM TOPICAL at 19:49

## 2022-01-01 RX ADMIN — PIPERACILLIN AND TAZOBACTAM 25 GRAM(S): 4; .5 INJECTION, POWDER, LYOPHILIZED, FOR SOLUTION INTRAVENOUS at 13:01

## 2022-01-01 RX ADMIN — BUMETANIDE 2 MILLIGRAM(S): 0.25 INJECTION INTRAMUSCULAR; INTRAVENOUS at 17:32

## 2022-01-01 RX ADMIN — CHLORHEXIDINE GLUCONATE 15 MILLILITER(S): 213 SOLUTION TOPICAL at 17:16

## 2022-01-01 RX ADMIN — Medication 40 MILLIGRAM(S): at 17:03

## 2022-01-01 RX ADMIN — Medication 6 MILLIGRAM(S): at 06:33

## 2022-01-01 RX ADMIN — Medication 1 TABLET(S): at 18:45

## 2022-01-01 RX ADMIN — APIXABAN 5 MILLIGRAM(S): 2.5 TABLET, FILM COATED ORAL at 18:30

## 2022-01-01 RX ADMIN — ATORVASTATIN CALCIUM 40 MILLIGRAM(S): 80 TABLET, FILM COATED ORAL at 21:07

## 2022-01-01 RX ADMIN — Medication 650 MILLIGRAM(S): at 21:08

## 2022-01-01 RX ADMIN — LIDOCAINE 1 PATCH: 4 CREAM TOPICAL at 00:00

## 2022-01-01 RX ADMIN — HEPARIN SODIUM 5000 UNIT(S): 5000 INJECTION INTRAVENOUS; SUBCUTANEOUS at 17:06

## 2022-01-01 RX ADMIN — PIPERACILLIN AND TAZOBACTAM 25 GRAM(S): 4; .5 INJECTION, POWDER, LYOPHILIZED, FOR SOLUTION INTRAVENOUS at 20:38

## 2022-01-01 RX ADMIN — GABAPENTIN 100 MILLIGRAM(S): 400 CAPSULE ORAL at 23:02

## 2022-01-01 RX ADMIN — GABAPENTIN 100 MILLIGRAM(S): 400 CAPSULE ORAL at 13:45

## 2022-01-01 RX ADMIN — Medication 100 MILLIGRAM(S): at 21:26

## 2022-01-01 RX ADMIN — Medication 40 MILLIGRAM(S): at 13:01

## 2022-01-01 RX ADMIN — Medication 100 MILLIGRAM(S): at 13:28

## 2022-01-01 RX ADMIN — SACUBITRIL AND VALSARTAN 1 TABLET(S): 24; 26 TABLET, FILM COATED ORAL at 18:48

## 2022-01-01 RX ADMIN — ATORVASTATIN CALCIUM 40 MILLIGRAM(S): 80 TABLET, FILM COATED ORAL at 21:28

## 2022-01-01 RX ADMIN — LIDOCAINE 1 PATCH: 4 CREAM TOPICAL at 11:27

## 2022-01-01 RX ADMIN — Medication 200 MILLIGRAM(S): at 11:28

## 2022-01-01 RX ADMIN — GABAPENTIN 100 MILLIGRAM(S): 400 CAPSULE ORAL at 05:36

## 2022-01-01 RX ADMIN — BUMETANIDE 2 MILLIGRAM(S): 0.25 INJECTION INTRAMUSCULAR; INTRAVENOUS at 05:16

## 2022-01-01 RX ADMIN — BUMETANIDE 4 MILLIGRAM(S): 0.25 INJECTION INTRAMUSCULAR; INTRAVENOUS at 16:27

## 2022-01-01 RX ADMIN — SODIUM CHLORIDE 50 MILLILITER(S): 9 INJECTION, SOLUTION INTRAVENOUS at 19:34

## 2022-01-01 RX ADMIN — PIPERACILLIN AND TAZOBACTAM 25 GRAM(S): 4; .5 INJECTION, POWDER, LYOPHILIZED, FOR SOLUTION INTRAVENOUS at 01:08

## 2022-01-01 RX ADMIN — SODIUM CHLORIDE 100 MILLILITER(S): 9 INJECTION, SOLUTION INTRAVENOUS at 19:57

## 2022-01-01 RX ADMIN — LIDOCAINE 1 PATCH: 4 CREAM TOPICAL at 13:11

## 2022-01-01 RX ADMIN — SPIRONOLACTONE 25 MILLIGRAM(S): 25 TABLET, FILM COATED ORAL at 05:28

## 2022-01-01 RX ADMIN — SACUBITRIL AND VALSARTAN 1 TABLET(S): 24; 26 TABLET, FILM COATED ORAL at 18:04

## 2022-01-01 RX ADMIN — PIPERACILLIN AND TAZOBACTAM 25 GRAM(S): 4; .5 INJECTION, POWDER, LYOPHILIZED, FOR SOLUTION INTRAVENOUS at 01:00

## 2022-01-01 RX ADMIN — Medication 1 TABLET(S): at 06:05

## 2022-01-01 RX ADMIN — CHLORHEXIDINE GLUCONATE 15 MILLILITER(S): 213 SOLUTION TOPICAL at 05:43

## 2022-01-01 RX ADMIN — Medication 1 TABLET(S): at 18:48

## 2022-01-01 RX ADMIN — GABAPENTIN 100 MILLIGRAM(S): 400 CAPSULE ORAL at 05:30

## 2022-01-01 RX ADMIN — GABAPENTIN 100 MILLIGRAM(S): 400 CAPSULE ORAL at 05:07

## 2022-01-01 RX ADMIN — HEPARIN SODIUM 5000 UNIT(S): 5000 INJECTION INTRAVENOUS; SUBCUTANEOUS at 05:52

## 2022-01-01 RX ADMIN — ALBUTEROL 2 PUFF(S): 90 AEROSOL, METERED ORAL at 08:34

## 2022-01-01 RX ADMIN — ALBUTEROL 2 PUFF(S): 90 AEROSOL, METERED ORAL at 01:53

## 2022-01-01 RX ADMIN — Medication 0.5 MILLIGRAM(S): at 20:07

## 2022-01-01 RX ADMIN — GABAPENTIN 100 MILLIGRAM(S): 400 CAPSULE ORAL at 21:08

## 2022-01-01 RX ADMIN — PIPERACILLIN AND TAZOBACTAM 25 GRAM(S): 4; .5 INJECTION, POWDER, LYOPHILIZED, FOR SOLUTION INTRAVENOUS at 21:34

## 2022-01-01 RX ADMIN — ALBUTEROL 2 PUFF(S): 90 AEROSOL, METERED ORAL at 01:12

## 2022-01-01 RX ADMIN — HEPARIN SODIUM 5000 UNIT(S): 5000 INJECTION INTRAVENOUS; SUBCUTANEOUS at 05:30

## 2022-01-01 RX ADMIN — Medication 1 PUFF(S): at 07:30

## 2022-01-01 RX ADMIN — GABAPENTIN 100 MILLIGRAM(S): 400 CAPSULE ORAL at 14:35

## 2022-01-01 RX ADMIN — Medication 1 PUFF(S): at 20:31

## 2022-01-01 RX ADMIN — SPIRONOLACTONE 25 MILLIGRAM(S): 25 TABLET, FILM COATED ORAL at 05:38

## 2022-01-01 RX ADMIN — Medication 1 TABLET(S): at 05:41

## 2022-01-01 RX ADMIN — Medication 1 TABLET(S): at 17:35

## 2022-01-01 RX ADMIN — Medication 1: at 12:45

## 2022-01-01 RX ADMIN — AMIODARONE HYDROCHLORIDE 200 MILLIGRAM(S): 400 TABLET ORAL at 05:41

## 2022-01-01 RX ADMIN — Medication 650 MILLIGRAM(S): at 19:20

## 2022-01-01 RX ADMIN — Medication 81 MILLIGRAM(S): at 08:40

## 2022-01-01 RX ADMIN — GABAPENTIN 100 MILLIGRAM(S): 400 CAPSULE ORAL at 05:17

## 2022-01-01 RX ADMIN — BUMETANIDE 2 MILLIGRAM(S): 0.25 INJECTION INTRAMUSCULAR; INTRAVENOUS at 18:46

## 2022-01-01 RX ADMIN — PANTOPRAZOLE SODIUM 40 MILLIGRAM(S): 20 TABLET, DELAYED RELEASE ORAL at 06:33

## 2022-01-01 RX ADMIN — Medication 1 PUFF(S): at 19:32

## 2022-01-01 RX ADMIN — AMIODARONE HYDROCHLORIDE 200 MILLIGRAM(S): 400 TABLET ORAL at 06:31

## 2022-01-01 RX ADMIN — SACUBITRIL AND VALSARTAN 1 TABLET(S): 24; 26 TABLET, FILM COATED ORAL at 22:08

## 2022-01-01 RX ADMIN — CHLORHEXIDINE GLUCONATE 15 MILLILITER(S): 213 SOLUTION TOPICAL at 17:03

## 2022-01-01 RX ADMIN — LIDOCAINE 1 PATCH: 4 CREAM TOPICAL at 18:30

## 2022-01-01 RX ADMIN — Medication 1 TABLET(S): at 12:37

## 2022-01-01 RX ADMIN — Medication 1: at 06:36

## 2022-01-01 RX ADMIN — APIXABAN 5 MILLIGRAM(S): 2.5 TABLET, FILM COATED ORAL at 17:32

## 2022-01-01 RX ADMIN — GABAPENTIN 100 MILLIGRAM(S): 400 CAPSULE ORAL at 05:09

## 2022-01-01 RX ADMIN — PANTOPRAZOLE SODIUM 40 MILLIGRAM(S): 20 TABLET, DELAYED RELEASE ORAL at 06:05

## 2022-01-01 RX ADMIN — Medication 81 MILLIGRAM(S): at 10:53

## 2022-01-01 RX ADMIN — GABAPENTIN 100 MILLIGRAM(S): 400 CAPSULE ORAL at 12:37

## 2022-01-01 RX ADMIN — PANTOPRAZOLE SODIUM 40 MILLIGRAM(S): 20 TABLET, DELAYED RELEASE ORAL at 13:27

## 2022-01-01 RX ADMIN — BUMETANIDE 2 MILLIGRAM(S): 0.25 INJECTION INTRAMUSCULAR; INTRAVENOUS at 05:17

## 2022-01-01 RX ADMIN — Medication 250 MILLIGRAM(S): at 17:06

## 2022-01-01 RX ADMIN — HEPARIN SODIUM 5000 UNIT(S): 5000 INJECTION INTRAVENOUS; SUBCUTANEOUS at 05:42

## 2022-01-01 RX ADMIN — Medication 100 MILLIGRAM(S): at 05:14

## 2022-01-01 RX ADMIN — Medication 81 MILLIGRAM(S): at 14:35

## 2022-01-01 RX ADMIN — Medication 1: at 05:37

## 2022-01-01 RX ADMIN — Medication 100 MILLIGRAM(S): at 13:21

## 2022-01-01 RX ADMIN — LIDOCAINE 1 PATCH: 4 CREAM TOPICAL at 07:43

## 2022-01-01 RX ADMIN — CHLORHEXIDINE GLUCONATE 15 MILLILITER(S): 213 SOLUTION TOPICAL at 17:23

## 2022-01-01 RX ADMIN — ALBUTEROL 2 PUFF(S): 90 AEROSOL, METERED ORAL at 07:30

## 2022-01-01 RX ADMIN — AMIODARONE HYDROCHLORIDE 200 MILLIGRAM(S): 400 TABLET ORAL at 05:17

## 2022-01-01 RX ADMIN — ALBUTEROL 2 PUFF(S): 90 AEROSOL, METERED ORAL at 01:30

## 2022-01-01 RX ADMIN — Medication 6 MILLIGRAM(S): at 05:44

## 2022-01-01 RX ADMIN — Medication 1 TABLET(S): at 06:33

## 2022-01-01 RX ADMIN — BUMETANIDE 2 MILLIGRAM(S): 0.25 INJECTION INTRAMUSCULAR; INTRAVENOUS at 05:28

## 2022-01-01 RX ADMIN — ALBUTEROL 2 PUFF(S): 90 AEROSOL, METERED ORAL at 00:36

## 2022-01-01 RX ADMIN — Medication 650 MILLIGRAM(S): at 00:34

## 2022-01-01 RX ADMIN — Medication 100 MILLIGRAM(S): at 22:26

## 2022-01-01 RX ADMIN — APIXABAN 5 MILLIGRAM(S): 2.5 TABLET, FILM COATED ORAL at 16:27

## 2022-01-01 RX ADMIN — HEPARIN SODIUM 5000 UNIT(S): 5000 INJECTION INTRAVENOUS; SUBCUTANEOUS at 05:16

## 2022-01-01 RX ADMIN — LIDOCAINE 1 PATCH: 4 CREAM TOPICAL at 19:45

## 2022-01-01 RX ADMIN — Medication 1: at 05:41

## 2022-01-01 RX ADMIN — BUMETANIDE 2 MILLIGRAM(S): 0.25 INJECTION INTRAMUSCULAR; INTRAVENOUS at 17:31

## 2022-01-01 RX ADMIN — Medication 200 MILLIGRAM(S): at 18:48

## 2022-01-01 RX ADMIN — Medication 1 TABLET(S): at 05:15

## 2022-01-01 RX ADMIN — GABAPENTIN 100 MILLIGRAM(S): 400 CAPSULE ORAL at 05:29

## 2022-01-01 RX ADMIN — LACTULOSE 20 GRAM(S): 10 SOLUTION ORAL at 17:19

## 2022-01-01 RX ADMIN — PIPERACILLIN AND TAZOBACTAM 25 GRAM(S): 4; .5 INJECTION, POWDER, LYOPHILIZED, FOR SOLUTION INTRAVENOUS at 12:32

## 2022-01-01 RX ADMIN — PANTOPRAZOLE SODIUM 40 MILLIGRAM(S): 20 TABLET, DELAYED RELEASE ORAL at 05:11

## 2022-01-01 RX ADMIN — SODIUM CHLORIDE 75 MILLILITER(S): 9 INJECTION, SOLUTION INTRAVENOUS at 09:11

## 2022-01-01 RX ADMIN — APIXABAN 5 MILLIGRAM(S): 2.5 TABLET, FILM COATED ORAL at 17:30

## 2022-01-01 RX ADMIN — ALBUTEROL 2 PUFF(S): 90 AEROSOL, METERED ORAL at 13:39

## 2022-01-01 RX ADMIN — SODIUM CHLORIDE 75 MILLILITER(S): 9 INJECTION, SOLUTION INTRAVENOUS at 05:43

## 2022-01-01 RX ADMIN — LIDOCAINE 1 PATCH: 4 CREAM TOPICAL at 18:23

## 2022-01-01 RX ADMIN — BUMETANIDE 2 MILLIGRAM(S): 0.25 INJECTION INTRAMUSCULAR; INTRAVENOUS at 17:23

## 2022-01-01 RX ADMIN — ATORVASTATIN CALCIUM 40 MILLIGRAM(S): 80 TABLET, FILM COATED ORAL at 21:15

## 2022-01-01 RX ADMIN — Medication 1 PUFF(S): at 12:45

## 2022-01-01 RX ADMIN — CHLORHEXIDINE GLUCONATE 15 MILLILITER(S): 213 SOLUTION TOPICAL at 17:06

## 2022-01-01 RX ADMIN — Medication 6 MILLIGRAM(S): at 05:11

## 2022-01-01 RX ADMIN — Medication 1 TABLET(S): at 05:11

## 2022-01-01 RX ADMIN — CHLORHEXIDINE GLUCONATE 15 MILLILITER(S): 213 SOLUTION TOPICAL at 06:47

## 2022-01-01 RX ADMIN — Medication 100 MILLIGRAM(S): at 13:02

## 2022-01-01 RX ADMIN — AMIODARONE HYDROCHLORIDE 200 MILLIGRAM(S): 400 TABLET ORAL at 05:16

## 2022-01-01 RX ADMIN — Medication 1 PUFF(S): at 20:39

## 2022-01-01 RX ADMIN — CHLORHEXIDINE GLUCONATE 1 APPLICATION(S): 213 SOLUTION TOPICAL at 05:37

## 2022-01-01 RX ADMIN — Medication 81 MILLIGRAM(S): at 11:06

## 2022-01-01 RX ADMIN — Medication 25 MILLIGRAM(S): at 05:44

## 2022-01-01 RX ADMIN — HEPARIN SODIUM 5000 UNIT(S): 5000 INJECTION INTRAVENOUS; SUBCUTANEOUS at 05:40

## 2022-01-01 RX ADMIN — Medication 40 MILLIGRAM(S): at 05:07

## 2022-01-01 RX ADMIN — Medication 100 MILLIGRAM(S): at 21:54

## 2022-01-01 RX ADMIN — DEXMEDETOMIDINE HYDROCHLORIDE IN 0.9% SODIUM CHLORIDE 4.08 MICROGRAM(S)/KG/HR: 4 INJECTION INTRAVENOUS at 23:30

## 2022-01-01 RX ADMIN — CEFEPIME 100 MILLIGRAM(S): 1 INJECTION, POWDER, FOR SOLUTION INTRAMUSCULAR; INTRAVENOUS at 12:46

## 2022-01-01 RX ADMIN — HEPARIN SODIUM 5000 UNIT(S): 5000 INJECTION INTRAVENOUS; SUBCUTANEOUS at 06:47

## 2022-01-01 RX ADMIN — GABAPENTIN 100 MILLIGRAM(S): 400 CAPSULE ORAL at 13:10

## 2022-01-01 RX ADMIN — Medication 1 PUFF(S): at 13:39

## 2022-01-01 RX ADMIN — CHLORHEXIDINE GLUCONATE 1 APPLICATION(S): 213 SOLUTION TOPICAL at 05:17

## 2022-01-01 RX ADMIN — PANTOPRAZOLE SODIUM 40 MILLIGRAM(S): 20 TABLET, DELAYED RELEASE ORAL at 05:33

## 2022-01-01 RX ADMIN — APIXABAN 5 MILLIGRAM(S): 2.5 TABLET, FILM COATED ORAL at 05:44

## 2022-01-01 RX ADMIN — Medication 1 TABLET(S): at 17:03

## 2022-01-01 RX ADMIN — Medication 100 MILLIGRAM(S): at 21:57

## 2022-01-01 RX ADMIN — Medication 100 MILLIGRAM(S): at 05:07

## 2022-01-01 RX ADMIN — GABAPENTIN 100 MILLIGRAM(S): 400 CAPSULE ORAL at 12:12

## 2022-01-01 RX ADMIN — GABAPENTIN 100 MILLIGRAM(S): 400 CAPSULE ORAL at 21:54

## 2022-01-01 RX ADMIN — ALBUTEROL 2 PUFF(S): 90 AEROSOL, METERED ORAL at 02:13

## 2022-01-01 RX ADMIN — LIDOCAINE 1 PATCH: 4 CREAM TOPICAL at 11:34

## 2022-01-01 RX ADMIN — SPIRONOLACTONE 25 MILLIGRAM(S): 25 TABLET, FILM COATED ORAL at 12:26

## 2022-01-01 RX ADMIN — AMIODARONE HYDROCHLORIDE 200 MILLIGRAM(S): 400 TABLET ORAL at 05:43

## 2022-01-01 RX ADMIN — FENTANYL CITRATE 100 MICROGRAM(S): 50 INJECTION INTRAVENOUS at 17:41

## 2022-01-01 RX ADMIN — Medication 100 MILLIGRAM(S): at 05:32

## 2022-01-01 RX ADMIN — Medication 100 MILLIGRAM(S): at 21:39

## 2022-01-01 RX ADMIN — Medication 1 TABLET(S): at 17:12

## 2022-01-01 RX ADMIN — Medication 40 MILLIGRAM(S): at 05:16

## 2022-01-01 RX ADMIN — GABAPENTIN 100 MILLIGRAM(S): 400 CAPSULE ORAL at 22:26

## 2022-01-01 RX ADMIN — PIPERACILLIN AND TAZOBACTAM 25 GRAM(S): 4; .5 INJECTION, POWDER, LYOPHILIZED, FOR SOLUTION INTRAVENOUS at 12:19

## 2022-01-01 RX ADMIN — Medication 1 PUFF(S): at 19:39

## 2022-01-01 RX ADMIN — Medication 6 MILLIGRAM(S): at 05:15

## 2022-01-01 RX ADMIN — ALBUTEROL 2 PUFF(S): 90 AEROSOL, METERED ORAL at 20:35

## 2022-01-01 RX ADMIN — MUPIROCIN 1 APPLICATION(S): 20 OINTMENT TOPICAL at 17:20

## 2022-01-01 RX ADMIN — APIXABAN 5 MILLIGRAM(S): 2.5 TABLET, FILM COATED ORAL at 06:31

## 2022-01-01 RX ADMIN — FENTANYL CITRATE 100 MICROGRAM(S): 50 INJECTION INTRAVENOUS at 19:29

## 2022-01-01 RX ADMIN — HEPARIN SODIUM 5000 UNIT(S): 5000 INJECTION INTRAVENOUS; SUBCUTANEOUS at 17:24

## 2022-01-01 RX ADMIN — APIXABAN 5 MILLIGRAM(S): 2.5 TABLET, FILM COATED ORAL at 05:16

## 2022-01-01 RX ADMIN — Medication 1 PUFF(S): at 13:05

## 2022-01-01 RX ADMIN — Medication 25 MILLIGRAM(S): at 05:38

## 2022-01-01 RX ADMIN — MUPIROCIN 1 APPLICATION(S): 20 OINTMENT TOPICAL at 06:50

## 2022-01-01 RX ADMIN — SPIRONOLACTONE 25 MILLIGRAM(S): 25 TABLET, FILM COATED ORAL at 05:52

## 2022-01-01 RX ADMIN — SACUBITRIL AND VALSARTAN 1 TABLET(S): 24; 26 TABLET, FILM COATED ORAL at 18:45

## 2022-01-01 RX ADMIN — MUPIROCIN 1 APPLICATION(S): 20 OINTMENT TOPICAL at 05:43

## 2022-01-01 RX ADMIN — Medication 40 MILLIGRAM(S): at 05:30

## 2022-01-01 RX ADMIN — Medication 1 PUFF(S): at 12:31

## 2022-01-01 RX ADMIN — GABAPENTIN 100 MILLIGRAM(S): 400 CAPSULE ORAL at 12:45

## 2022-01-01 RX ADMIN — PANTOPRAZOLE SODIUM 40 MILLIGRAM(S): 20 TABLET, DELAYED RELEASE ORAL at 05:38

## 2022-01-01 RX ADMIN — SACUBITRIL AND VALSARTAN 1 TABLET(S): 24; 26 TABLET, FILM COATED ORAL at 05:30

## 2022-01-01 RX ADMIN — Medication 1: at 17:18

## 2022-01-01 RX ADMIN — Medication 100 MILLIGRAM(S): at 05:15

## 2022-01-01 RX ADMIN — Medication 100 MILLIGRAM(S): at 13:10

## 2022-01-01 RX ADMIN — PANTOPRAZOLE SODIUM 40 MILLIGRAM(S): 20 TABLET, DELAYED RELEASE ORAL at 06:31

## 2022-01-01 RX ADMIN — Medication 1 TABLET(S): at 08:40

## 2022-01-01 RX ADMIN — ALBUTEROL 2 PUFF(S): 90 AEROSOL, METERED ORAL at 14:21

## 2022-01-01 RX ADMIN — Medication 1 PUFF(S): at 15:23

## 2022-01-01 RX ADMIN — Medication 1 TABLET(S): at 05:30

## 2022-01-01 RX ADMIN — PANTOPRAZOLE SODIUM 40 MILLIGRAM(S): 20 TABLET, DELAYED RELEASE ORAL at 11:29

## 2022-01-01 RX ADMIN — Medication 1 TABLET(S): at 05:43

## 2022-01-01 RX ADMIN — Medication 1 TABLET(S): at 05:44

## 2022-01-01 RX ADMIN — APIXABAN 5 MILLIGRAM(S): 2.5 TABLET, FILM COATED ORAL at 05:30

## 2022-01-01 RX ADMIN — Medication 650 MILLIGRAM(S): at 18:55

## 2022-01-01 RX ADMIN — Medication 1 TABLET(S): at 05:38

## 2022-01-01 RX ADMIN — GABAPENTIN 100 MILLIGRAM(S): 400 CAPSULE ORAL at 05:43

## 2022-01-01 RX ADMIN — Medication 100 MILLIGRAM(S): at 22:03

## 2022-01-01 RX ADMIN — Medication 1 PUFF(S): at 06:32

## 2022-01-01 RX ADMIN — BUMETANIDE 2 MILLIGRAM(S): 0.25 INJECTION INTRAMUSCULAR; INTRAVENOUS at 17:35

## 2022-01-01 RX ADMIN — ALBUTEROL 2 PUFF(S): 90 AEROSOL, METERED ORAL at 07:47

## 2022-01-01 RX ADMIN — LIDOCAINE 1 PATCH: 4 CREAM TOPICAL at 01:38

## 2022-01-01 RX ADMIN — PIPERACILLIN AND TAZOBACTAM 25 GRAM(S): 4; .5 INJECTION, POWDER, LYOPHILIZED, FOR SOLUTION INTRAVENOUS at 05:29

## 2022-01-01 RX ADMIN — Medication 81 MILLIGRAM(S): at 12:45

## 2022-01-01 RX ADMIN — Medication 25 MILLIGRAM(S): at 05:33

## 2022-01-01 RX ADMIN — Medication 81 MILLIGRAM(S): at 12:26

## 2022-01-01 RX ADMIN — Medication 1 TABLET(S): at 06:31

## 2022-01-01 RX ADMIN — ALBUTEROL 2 PUFF(S): 90 AEROSOL, METERED ORAL at 20:39

## 2022-01-01 RX ADMIN — GABAPENTIN 100 MILLIGRAM(S): 400 CAPSULE ORAL at 06:36

## 2022-01-01 RX ADMIN — Medication 25 MILLIGRAM(S): at 05:28

## 2022-01-01 RX ADMIN — Medication 81 MILLIGRAM(S): at 13:28

## 2022-01-01 RX ADMIN — APIXABAN 5 MILLIGRAM(S): 2.5 TABLET, FILM COATED ORAL at 05:09

## 2022-01-01 RX ADMIN — ALBUTEROL 2 PUFF(S): 90 AEROSOL, METERED ORAL at 13:30

## 2022-01-01 RX ADMIN — Medication 1 TABLET(S): at 05:33

## 2022-01-01 RX ADMIN — SODIUM CHLORIDE 50 MILLILITER(S): 9 INJECTION, SOLUTION INTRAVENOUS at 05:36

## 2022-01-01 RX ADMIN — APIXABAN 5 MILLIGRAM(S): 2.5 TABLET, FILM COATED ORAL at 05:38

## 2022-01-01 RX ADMIN — Medication 1 TABLET(S): at 05:28

## 2022-01-01 RX ADMIN — Medication 1 PUFF(S): at 07:51

## 2022-01-01 RX ADMIN — APIXABAN 5 MILLIGRAM(S): 2.5 TABLET, FILM COATED ORAL at 05:08

## 2022-01-01 RX ADMIN — PIPERACILLIN AND TAZOBACTAM 25 GRAM(S): 4; .5 INJECTION, POWDER, LYOPHILIZED, FOR SOLUTION INTRAVENOUS at 13:49

## 2022-01-01 RX ADMIN — LIDOCAINE 1 PATCH: 4 CREAM TOPICAL at 11:33

## 2022-01-01 RX ADMIN — MUPIROCIN 1 APPLICATION(S): 20 OINTMENT TOPICAL at 05:38

## 2022-01-01 RX ADMIN — CHLORHEXIDINE GLUCONATE 1 APPLICATION(S): 213 SOLUTION TOPICAL at 07:36

## 2022-01-01 RX ADMIN — Medication 100 MILLIGRAM(S): at 05:45

## 2022-01-01 RX ADMIN — HEPARIN SODIUM 5000 UNIT(S): 5000 INJECTION INTRAVENOUS; SUBCUTANEOUS at 17:16

## 2022-01-01 RX ADMIN — AMIODARONE HYDROCHLORIDE 200 MILLIGRAM(S): 400 TABLET ORAL at 05:09

## 2022-01-01 RX ADMIN — BUMETANIDE 2 MILLIGRAM(S): 0.25 INJECTION INTRAMUSCULAR; INTRAVENOUS at 18:51

## 2022-01-01 RX ADMIN — HEPARIN SODIUM 5000 UNIT(S): 5000 INJECTION INTRAVENOUS; SUBCUTANEOUS at 06:49

## 2022-01-01 RX ADMIN — Medication 650 MILLIGRAM(S): at 20:00

## 2022-01-01 RX ADMIN — GABAPENTIN 100 MILLIGRAM(S): 400 CAPSULE ORAL at 13:02

## 2022-01-01 RX ADMIN — Medication 81 MILLIGRAM(S): at 12:19

## 2022-01-01 RX ADMIN — PIPERACILLIN AND TAZOBACTAM 25 GRAM(S): 4; .5 INJECTION, POWDER, LYOPHILIZED, FOR SOLUTION INTRAVENOUS at 21:41

## 2022-01-01 RX ADMIN — AMIODARONE HYDROCHLORIDE 200 MILLIGRAM(S): 400 TABLET ORAL at 06:33

## 2022-01-01 RX ADMIN — GABAPENTIN 100 MILLIGRAM(S): 400 CAPSULE ORAL at 22:42

## 2022-01-01 RX ADMIN — ALBUTEROL 2 PUFF(S): 90 AEROSOL, METERED ORAL at 15:23

## 2022-01-01 RX ADMIN — PANTOPRAZOLE SODIUM 40 MILLIGRAM(S): 20 TABLET, DELAYED RELEASE ORAL at 05:41

## 2022-01-01 RX ADMIN — GABAPENTIN 100 MILLIGRAM(S): 400 CAPSULE ORAL at 21:27

## 2022-01-01 RX ADMIN — Medication 100 MILLIGRAM(S): at 13:14

## 2022-01-01 RX ADMIN — APIXABAN 5 MILLIGRAM(S): 2.5 TABLET, FILM COATED ORAL at 18:52

## 2022-01-01 RX ADMIN — GABAPENTIN 100 MILLIGRAM(S): 400 CAPSULE ORAL at 21:30

## 2022-01-01 RX ADMIN — Medication 100 MILLIGRAM(S): at 05:29

## 2022-01-01 RX ADMIN — ALBUTEROL 2 PUFF(S): 90 AEROSOL, METERED ORAL at 19:55

## 2022-01-01 RX ADMIN — PANTOPRAZOLE SODIUM 40 MILLIGRAM(S): 20 TABLET, DELAYED RELEASE ORAL at 12:11

## 2022-01-01 RX ADMIN — ALBUTEROL 2 PUFF(S): 90 AEROSOL, METERED ORAL at 07:37

## 2022-01-01 RX ADMIN — GABAPENTIN 100 MILLIGRAM(S): 400 CAPSULE ORAL at 21:36

## 2022-01-01 RX ADMIN — GABAPENTIN 100 MILLIGRAM(S): 400 CAPSULE ORAL at 21:39

## 2022-01-01 RX ADMIN — HEPARIN SODIUM 5000 UNIT(S): 5000 INJECTION INTRAVENOUS; SUBCUTANEOUS at 17:19

## 2022-01-01 RX ADMIN — Medication 1 PUFF(S): at 07:37

## 2022-01-01 RX ADMIN — Medication 1 TABLET(S): at 17:32

## 2022-01-01 RX ADMIN — CHLORHEXIDINE GLUCONATE 15 MILLILITER(S): 213 SOLUTION TOPICAL at 05:30

## 2022-01-01 RX ADMIN — ATORVASTATIN CALCIUM 40 MILLIGRAM(S): 80 TABLET, FILM COATED ORAL at 22:05

## 2022-01-01 RX ADMIN — LIDOCAINE 1 PATCH: 4 CREAM TOPICAL at 01:55

## 2022-01-01 RX ADMIN — CHLORHEXIDINE GLUCONATE 15 MILLILITER(S): 213 SOLUTION TOPICAL at 05:52

## 2022-01-01 RX ADMIN — Medication 81 MILLIGRAM(S): at 13:11

## 2022-01-01 RX ADMIN — PIPERACILLIN AND TAZOBACTAM 25 GRAM(S): 4; .5 INJECTION, POWDER, LYOPHILIZED, FOR SOLUTION INTRAVENOUS at 13:27

## 2022-01-01 RX ADMIN — GABAPENTIN 100 MILLIGRAM(S): 400 CAPSULE ORAL at 22:08

## 2022-01-01 RX ADMIN — ATORVASTATIN CALCIUM 40 MILLIGRAM(S): 80 TABLET, FILM COATED ORAL at 21:30

## 2022-01-01 RX ADMIN — APIXABAN 5 MILLIGRAM(S): 2.5 TABLET, FILM COATED ORAL at 05:11

## 2022-01-01 RX ADMIN — ATORVASTATIN CALCIUM 40 MILLIGRAM(S): 80 TABLET, FILM COATED ORAL at 22:03

## 2022-01-01 RX ADMIN — FENTANYL CITRATE 100 MICROGRAM(S): 50 INJECTION INTRAVENOUS at 13:04

## 2022-01-01 RX ADMIN — GABAPENTIN 100 MILLIGRAM(S): 400 CAPSULE ORAL at 06:31

## 2022-01-01 RX ADMIN — Medication 100 MILLIGRAM(S): at 15:52

## 2022-01-01 RX ADMIN — Medication 100 MILLIGRAM(S): at 05:17

## 2022-01-01 RX ADMIN — PIPERACILLIN AND TAZOBACTAM 200 GRAM(S): 4; .5 INJECTION, POWDER, LYOPHILIZED, FOR SOLUTION INTRAVENOUS at 13:03

## 2022-01-01 RX ADMIN — Medication 650 MILLIGRAM(S): at 22:40

## 2022-01-01 RX ADMIN — Medication 81 MILLIGRAM(S): at 12:38

## 2022-01-01 RX ADMIN — SACUBITRIL AND VALSARTAN 1 TABLET(S): 24; 26 TABLET, FILM COATED ORAL at 17:32

## 2022-01-01 RX ADMIN — LACTULOSE 20 GRAM(S): 10 SOLUTION ORAL at 17:23

## 2022-01-01 RX ADMIN — Medication 40 MILLIGRAM(S): at 05:41

## 2022-01-01 RX ADMIN — LIDOCAINE 1 PATCH: 4 CREAM TOPICAL at 05:57

## 2022-01-01 RX ADMIN — SODIUM CHLORIDE 75 MILLILITER(S): 9 INJECTION, SOLUTION INTRAVENOUS at 17:29

## 2022-01-01 RX ADMIN — Medication 40 MILLIGRAM(S): at 17:19

## 2022-01-01 RX ADMIN — Medication 1 TABLET(S): at 17:31

## 2022-01-01 RX ADMIN — Medication 1 PUFF(S): at 00:36

## 2022-01-01 RX ADMIN — GABAPENTIN 100 MILLIGRAM(S): 400 CAPSULE ORAL at 06:38

## 2022-01-01 RX ADMIN — SPIRONOLACTONE 25 MILLIGRAM(S): 25 TABLET, FILM COATED ORAL at 05:11

## 2022-01-01 RX ADMIN — ALBUTEROL 2 PUFF(S): 90 AEROSOL, METERED ORAL at 08:03

## 2022-01-01 RX ADMIN — GABAPENTIN 100 MILLIGRAM(S): 400 CAPSULE ORAL at 21:37

## 2022-01-01 RX ADMIN — PIPERACILLIN AND TAZOBACTAM 25 GRAM(S): 4; .5 INJECTION, POWDER, LYOPHILIZED, FOR SOLUTION INTRAVENOUS at 12:36

## 2022-01-01 RX ADMIN — Medication 100 MILLIGRAM(S): at 22:08

## 2022-01-01 RX ADMIN — ALBUTEROL 2 PUFF(S): 90 AEROSOL, METERED ORAL at 13:16

## 2022-01-01 RX ADMIN — Medication 250 MILLIGRAM(S): at 06:31

## 2022-01-01 RX ADMIN — Medication 1: at 17:47

## 2022-01-01 RX ADMIN — Medication 100 MILLIGRAM(S): at 21:36

## 2022-01-01 RX ADMIN — BUMETANIDE 2 MILLIGRAM(S): 0.25 INJECTION INTRAMUSCULAR; INTRAVENOUS at 06:34

## 2022-01-01 RX ADMIN — GABAPENTIN 100 MILLIGRAM(S): 400 CAPSULE ORAL at 13:28

## 2022-01-01 RX ADMIN — Medication 100 MILLIGRAM(S): at 05:38

## 2022-01-01 RX ADMIN — Medication 0.5 MILLIGRAM(S): at 08:08

## 2022-01-01 RX ADMIN — Medication 81 MILLIGRAM(S): at 12:12

## 2022-01-01 RX ADMIN — SACUBITRIL AND VALSARTAN 1 TABLET(S): 24; 26 TABLET, FILM COATED ORAL at 05:43

## 2022-01-01 RX ADMIN — AMIODARONE HYDROCHLORIDE 200 MILLIGRAM(S): 400 TABLET ORAL at 05:33

## 2022-01-01 RX ADMIN — BUMETANIDE 2 MILLIGRAM(S): 0.25 INJECTION INTRAMUSCULAR; INTRAVENOUS at 17:12

## 2022-01-01 RX ADMIN — MUPIROCIN 1 APPLICATION(S): 20 OINTMENT TOPICAL at 17:24

## 2022-01-01 RX ADMIN — Medication 3 MILLIGRAM(S): at 21:27

## 2022-01-01 RX ADMIN — PANTOPRAZOLE SODIUM 40 MILLIGRAM(S): 20 TABLET, DELAYED RELEASE ORAL at 06:03

## 2022-01-01 RX ADMIN — SODIUM CHLORIDE 75 MILLILITER(S): 9 INJECTION, SOLUTION INTRAVENOUS at 19:44

## 2022-01-01 RX ADMIN — Medication 100 MILLIGRAM(S): at 22:42

## 2022-01-01 RX ADMIN — LIDOCAINE 1 PATCH: 4 CREAM TOPICAL at 11:05

## 2022-01-01 RX ADMIN — GABAPENTIN 100 MILLIGRAM(S): 400 CAPSULE ORAL at 13:15

## 2022-01-01 RX ADMIN — APIXABAN 5 MILLIGRAM(S): 2.5 TABLET, FILM COATED ORAL at 17:22

## 2022-01-01 RX ADMIN — LIDOCAINE 1 PATCH: 4 CREAM TOPICAL at 19:54

## 2022-01-01 RX ADMIN — Medication 100 MILLIGRAM(S): at 13:03

## 2022-01-01 RX ADMIN — Medication 3 MILLIGRAM(S): at 21:55

## 2022-01-01 RX ADMIN — FENTANYL CITRATE 100 MICROGRAM(S): 50 INJECTION INTRAVENOUS at 19:07

## 2022-01-01 RX ADMIN — SODIUM CHLORIDE 1000 MILLILITER(S): 9 INJECTION, SOLUTION INTRAVENOUS at 23:35

## 2022-01-01 RX ADMIN — APIXABAN 5 MILLIGRAM(S): 2.5 TABLET, FILM COATED ORAL at 03:51

## 2022-01-01 RX ADMIN — LACTULOSE 20 GRAM(S): 10 SOLUTION ORAL at 05:42

## 2022-01-01 RX ADMIN — Medication 1 PUFF(S): at 01:12

## 2022-01-01 RX ADMIN — LIDOCAINE 1 PATCH: 4 CREAM TOPICAL at 19:56

## 2022-01-01 RX ADMIN — Medication 650 MILLIGRAM(S): at 08:04

## 2022-01-01 RX ADMIN — ATORVASTATIN CALCIUM 40 MILLIGRAM(S): 80 TABLET, FILM COATED ORAL at 21:40

## 2022-01-01 RX ADMIN — ALBUTEROL 2 PUFF(S): 90 AEROSOL, METERED ORAL at 20:31

## 2022-01-01 RX ADMIN — Medication 1 TABLET(S): at 18:26

## 2022-01-01 RX ADMIN — Medication 1 TABLET(S): at 18:06

## 2022-01-01 RX ADMIN — CHLORHEXIDINE GLUCONATE 15 MILLILITER(S): 213 SOLUTION TOPICAL at 17:40

## 2022-01-01 RX ADMIN — LIDOCAINE 1 PATCH: 4 CREAM TOPICAL at 23:22

## 2022-01-01 RX ADMIN — Medication 1 PUFF(S): at 01:30

## 2022-01-01 RX ADMIN — Medication 100 MILLIGRAM(S): at 05:28

## 2022-01-01 RX ADMIN — Medication 1: at 11:29

## 2022-01-01 RX ADMIN — Medication 250 MILLIGRAM(S): at 17:03

## 2022-01-01 RX ADMIN — Medication 100 MILLIGRAM(S): at 11:38

## 2022-01-01 RX ADMIN — Medication 650 MILLIGRAM(S): at 19:16

## 2022-01-01 RX ADMIN — Medication 25 MILLIGRAM(S): at 06:33

## 2022-01-01 RX ADMIN — PANTOPRAZOLE SODIUM 40 MILLIGRAM(S): 20 TABLET, DELAYED RELEASE ORAL at 05:17

## 2022-01-01 RX ADMIN — Medication 1 PUFF(S): at 01:58

## 2022-01-01 RX ADMIN — LACTULOSE 20 GRAM(S): 10 SOLUTION ORAL at 21:38

## 2022-01-01 RX ADMIN — LACTULOSE 20 GRAM(S): 10 SOLUTION ORAL at 06:47

## 2022-01-01 RX ADMIN — Medication 125 MILLIGRAM(S): at 13:03

## 2022-01-01 RX ADMIN — Medication 25 MILLIGRAM(S): at 12:12

## 2022-01-01 RX ADMIN — Medication 81 MILLIGRAM(S): at 11:27

## 2022-01-01 RX ADMIN — PANTOPRAZOLE SODIUM 40 MILLIGRAM(S): 20 TABLET, DELAYED RELEASE ORAL at 05:44

## 2022-01-01 RX ADMIN — LIDOCAINE 1 PATCH: 4 CREAM TOPICAL at 12:26

## 2022-01-01 RX ADMIN — PANTOPRAZOLE SODIUM 40 MILLIGRAM(S): 20 TABLET, DELAYED RELEASE ORAL at 11:16

## 2022-01-01 RX ADMIN — AMIODARONE HYDROCHLORIDE 200 MILLIGRAM(S): 400 TABLET ORAL at 05:11

## 2022-01-01 RX ADMIN — PIPERACILLIN AND TAZOBACTAM 25 GRAM(S): 4; .5 INJECTION, POWDER, LYOPHILIZED, FOR SOLUTION INTRAVENOUS at 12:09

## 2022-01-01 RX ADMIN — Medication 1: at 23:12

## 2022-01-01 RX ADMIN — BUMETANIDE 2 MILLIGRAM(S): 0.25 INJECTION INTRAMUSCULAR; INTRAVENOUS at 17:03

## 2022-01-01 RX ADMIN — GABAPENTIN 100 MILLIGRAM(S): 400 CAPSULE ORAL at 22:48

## 2022-01-01 RX ADMIN — APIXABAN 5 MILLIGRAM(S): 2.5 TABLET, FILM COATED ORAL at 06:33

## 2022-01-01 RX ADMIN — LIDOCAINE 1 PATCH: 4 CREAM TOPICAL at 12:45

## 2022-01-01 RX ADMIN — APIXABAN 5 MILLIGRAM(S): 2.5 TABLET, FILM COATED ORAL at 18:46

## 2022-01-01 RX ADMIN — Medication 1: at 17:52

## 2022-01-01 RX ADMIN — Medication 81 MILLIGRAM(S): at 11:05

## 2022-01-01 RX ADMIN — Medication 1 PUFF(S): at 14:21

## 2022-01-01 RX ADMIN — BUMETANIDE 2 MILLIGRAM(S): 0.25 INJECTION INTRAMUSCULAR; INTRAVENOUS at 05:44

## 2022-01-01 RX ADMIN — GABAPENTIN 100 MILLIGRAM(S): 400 CAPSULE ORAL at 05:37

## 2022-01-01 RX ADMIN — Medication 40 MILLIGRAM(S): at 21:09

## 2022-01-01 RX ADMIN — Medication 1 PUFF(S): at 01:35

## 2022-01-01 RX ADMIN — MUPIROCIN 1 APPLICATION(S): 20 OINTMENT TOPICAL at 17:19

## 2022-01-01 RX ADMIN — CHLORHEXIDINE GLUCONATE 15 MILLILITER(S): 213 SOLUTION TOPICAL at 05:40

## 2022-01-01 RX ADMIN — Medication 1 PUFF(S): at 20:23

## 2022-01-01 RX ADMIN — Medication 650 MILLIGRAM(S): at 14:14

## 2022-01-01 RX ADMIN — Medication 6 MILLIGRAM(S): at 06:31

## 2022-01-01 RX ADMIN — CHLORHEXIDINE GLUCONATE 1 APPLICATION(S): 213 SOLUTION TOPICAL at 05:38

## 2022-01-01 RX ADMIN — GABAPENTIN 100 MILLIGRAM(S): 400 CAPSULE ORAL at 13:04

## 2022-01-01 RX ADMIN — CHLORHEXIDINE GLUCONATE 15 MILLILITER(S): 213 SOLUTION TOPICAL at 05:37

## 2022-01-01 RX ADMIN — GABAPENTIN 100 MILLIGRAM(S): 400 CAPSULE ORAL at 05:15

## 2022-01-01 RX ADMIN — ATORVASTATIN CALCIUM 40 MILLIGRAM(S): 80 TABLET, FILM COATED ORAL at 22:48

## 2022-01-01 RX ADMIN — ATORVASTATIN CALCIUM 40 MILLIGRAM(S): 80 TABLET, FILM COATED ORAL at 21:37

## 2022-01-01 RX ADMIN — MUPIROCIN 1 APPLICATION(S): 20 OINTMENT TOPICAL at 21:39

## 2022-01-01 RX ADMIN — APIXABAN 5 MILLIGRAM(S): 2.5 TABLET, FILM COATED ORAL at 17:04

## 2022-01-01 RX ADMIN — APIXABAN 5 MILLIGRAM(S): 2.5 TABLET, FILM COATED ORAL at 17:31

## 2022-01-01 RX ADMIN — MUPIROCIN 1 APPLICATION(S): 20 OINTMENT TOPICAL at 06:47

## 2022-01-01 RX ADMIN — APIXABAN 5 MILLIGRAM(S): 2.5 TABLET, FILM COATED ORAL at 17:12

## 2022-01-01 RX ADMIN — AMIODARONE HYDROCHLORIDE 200 MILLIGRAM(S): 400 TABLET ORAL at 05:28

## 2022-01-01 RX ADMIN — APIXABAN 5 MILLIGRAM(S): 2.5 TABLET, FILM COATED ORAL at 18:04

## 2022-01-01 RX ADMIN — CHLORHEXIDINE GLUCONATE 15 MILLILITER(S): 213 SOLUTION TOPICAL at 05:16

## 2022-01-01 RX ADMIN — Medication 1 PUFF(S): at 00:37

## 2022-01-01 RX ADMIN — Medication 1 PUFF(S): at 07:47

## 2022-01-01 RX ADMIN — APIXABAN 5 MILLIGRAM(S): 2.5 TABLET, FILM COATED ORAL at 17:35

## 2022-01-01 RX ADMIN — GABAPENTIN 100 MILLIGRAM(S): 400 CAPSULE ORAL at 06:03

## 2022-01-01 RX ADMIN — PANTOPRAZOLE SODIUM 40 MILLIGRAM(S): 20 TABLET, DELAYED RELEASE ORAL at 05:16

## 2022-01-01 RX ADMIN — Medication 40 MILLIGRAM(S): at 10:15

## 2022-01-01 RX ADMIN — GABAPENTIN 100 MILLIGRAM(S): 400 CAPSULE ORAL at 22:03

## 2022-01-01 RX ADMIN — Medication 100 MILLIGRAM(S): at 14:13

## 2022-01-01 RX ADMIN — MUPIROCIN 1 APPLICATION(S): 20 OINTMENT TOPICAL at 17:16

## 2022-01-01 RX ADMIN — ALBUTEROL 2 PUFF(S): 90 AEROSOL, METERED ORAL at 07:51

## 2022-01-01 RX ADMIN — GABAPENTIN 100 MILLIGRAM(S): 400 CAPSULE ORAL at 14:13

## 2022-01-01 RX ADMIN — ALBUTEROL 2 PUFF(S): 90 AEROSOL, METERED ORAL at 06:33

## 2022-01-01 RX ADMIN — Medication 25 MILLIGRAM(S): at 05:15

## 2022-01-01 RX ADMIN — APIXABAN 5 MILLIGRAM(S): 2.5 TABLET, FILM COATED ORAL at 05:28

## 2022-01-01 RX ADMIN — Medication 100 MILLIGRAM(S): at 22:05

## 2022-01-01 RX ADMIN — ATORVASTATIN CALCIUM 40 MILLIGRAM(S): 80 TABLET, FILM COATED ORAL at 22:26

## 2022-01-01 RX ADMIN — Medication 650 MILLIGRAM(S): at 01:25

## 2022-01-01 RX ADMIN — GABAPENTIN 100 MILLIGRAM(S): 400 CAPSULE ORAL at 05:16

## 2022-01-01 RX ADMIN — PANTOPRAZOLE SODIUM 40 MILLIGRAM(S): 20 TABLET, DELAYED RELEASE ORAL at 11:35

## 2022-01-01 RX ADMIN — BUMETANIDE 2 MILLIGRAM(S): 0.25 INJECTION INTRAMUSCULAR; INTRAVENOUS at 06:32

## 2022-01-01 NOTE — CONSULT NOTE ADULT - PROBLEM SELECTOR RECOMMENDATION 3
s/p VANESSA cardioversion 11/22/21  Remains in SR  - continue Amiodarone 200mg PO daily  - continue Eliquis

## 2022-01-01 NOTE — PROGRESS NOTE ADULT - SUBJECTIVE AND OBJECTIVE BOX
INTERVAL HPI/OVERNIGHT EVENTS:    CC:    Vital Signs Last 24 Hrs  T(C): 37 (01 Jan 2022 11:37), Max: 37.1 (01 Jan 2022 08:15)  T(F): 98.6 (01 Jan 2022 11:37), Max: 98.8 (01 Jan 2022 08:15)  HR: 73 (01 Jan 2022 11:37) (63 - 74)  BP: 107/78 (01 Jan 2022 11:37) (97/73 - 107/78)  BP(mean): --  RR: 20 (01 Jan 2022 11:37) (18 - 20)  SpO2: 95% (01 Jan 2022 11:37) (95% - 99%)    PHYSICAL EXAM:    GENERAL:   CHEST/LUNG:   HEART:   ABDOMEN:   EXTREMITIES:      MEDICATIONS  (STANDING):  aMIOdarone    Tablet 200 milliGRAM(s) Oral daily  apixaban 5 milliGRAM(s) Oral every 12 hours  aspirin enteric coated 81 milliGRAM(s) Oral daily  atorvastatin 40 milliGRAM(s) Oral at bedtime  buMETAnide 4 milliGRAM(s) Oral every 12 hours  dextrose 40% Gel 15 Gram(s) Oral once  dextrose 5%. 1000 milliLiter(s) (50 mL/Hr) IV Continuous <Continuous>  dextrose 5%. 1000 milliLiter(s) (100 mL/Hr) IV Continuous <Continuous>  dextrose 50% Injectable 25 Gram(s) IV Push once  dextrose 50% Injectable 12.5 Gram(s) IV Push once  dextrose 50% Injectable 25 Gram(s) IV Push once  gabapentin 100 milliGRAM(s) Oral three times a day  glucagon  Injectable 1 milliGRAM(s) IntraMuscular once  insulin lispro (ADMELOG) corrective regimen sliding scale   SubCutaneous three times a day before meals  metoprolol succinate  milliGRAM(s) Oral daily    MEDICATIONS  (PRN):  acetaminophen     Tablet .. 650 milliGRAM(s) Oral every 6 hours PRN Temp greater or equal to 38C (100.4F), Mild Pain (1 - 3)  aluminum hydroxide/magnesium hydroxide/simethicone Suspension 30 milliLiter(s) Oral every 4 hours PRN Dyspepsia  melatonin 3 milliGRAM(s) Oral at bedtime PRN Insomnia  ondansetron Injectable 4 milliGRAM(s) IV Push every 8 hours PRN Nausea and/or Vomiting  traMADol 25 milliGRAM(s) Oral every 6 hours PRN Severe Pain (7 - 10)      Allergies    No Known Allergies    Intolerances    pork (Other)        LABS:                          14.0   5.18  )-----------( 184      ( 01 Jan 2022 09:36 )             44.0     01-01    133<L>  |  95<L>  |  55.0<H>  ----------------------------<  164<H>  4.1   |  22.0  |  1.62<H>    Ca    8.3<L>      01 Jan 2022 09:36  Phos  3.4     12-31  Mg     2.0     12-31            RADIOLOGY & ADDITIONAL TESTS:   INTERVAL HPI/OVERNIGHT EVENTS:    CC: decompensated CHF, substance abuse, CAD, atrial flutter    Chart and course reviewed. No complaints at present. Reports was evicted and has no home now. Denies shortness of breath.    Vital Signs Last 24 Hrs  T(C): 37 (01 Jan 2022 11:37), Max: 37.1 (01 Jan 2022 08:15)  T(F): 98.6 (01 Jan 2022 11:37), Max: 98.8 (01 Jan 2022 08:15)  HR: 73 (01 Jan 2022 11:37) (63 - 74)  BP: 107/78 (01 Jan 2022 11:37) (97/73 - 107/78)  BP(mean): --  RR: 20 (01 Jan 2022 11:37) (18 - 20)  SpO2: 95% (01 Jan 2022 11:37) (95% - 99%)    PHYSICAL EXAM:    GENERAL: alert, not in distress  CHEST/LUNG: b/l air entry, decreased in bases  HEART: reg  ABDOMEN: distended, non tender, soft, bs+  EXTREMITIES:  dressing in place, 1+ edema, non tender    MEDICATIONS  (STANDING):  aMIOdarone    Tablet 200 milliGRAM(s) Oral daily  apixaban 5 milliGRAM(s) Oral every 12 hours  aspirin enteric coated 81 milliGRAM(s) Oral daily  atorvastatin 40 milliGRAM(s) Oral at bedtime  buMETAnide 4 milliGRAM(s) Oral every 12 hours  dextrose 40% Gel 15 Gram(s) Oral once  dextrose 5%. 1000 milliLiter(s) (50 mL/Hr) IV Continuous <Continuous>  dextrose 5%. 1000 milliLiter(s) (100 mL/Hr) IV Continuous <Continuous>  dextrose 50% Injectable 25 Gram(s) IV Push once  dextrose 50% Injectable 12.5 Gram(s) IV Push once  dextrose 50% Injectable 25 Gram(s) IV Push once  gabapentin 100 milliGRAM(s) Oral three times a day  glucagon  Injectable 1 milliGRAM(s) IntraMuscular once  insulin lispro (ADMELOG) corrective regimen sliding scale   SubCutaneous three times a day before meals  metoprolol succinate  milliGRAM(s) Oral daily    MEDICATIONS  (PRN):  acetaminophen     Tablet .. 650 milliGRAM(s) Oral every 6 hours PRN Temp greater or equal to 38C (100.4F), Mild Pain (1 - 3)  aluminum hydroxide/magnesium hydroxide/simethicone Suspension 30 milliLiter(s) Oral every 4 hours PRN Dyspepsia  melatonin 3 milliGRAM(s) Oral at bedtime PRN Insomnia  ondansetron Injectable 4 milliGRAM(s) IV Push every 8 hours PRN Nausea and/or Vomiting  traMADol 25 milliGRAM(s) Oral every 6 hours PRN Severe Pain (7 - 10)      Allergies    No Known Allergies    Intolerances    pork (Other)        LABS:                          14.0   5.18  )-----------( 184      ( 01 Jan 2022 09:36 )             44.0     01-01    133<L>  |  95<L>  |  55.0<H>  ----------------------------<  164<H>  4.1   |  22.0  |  1.62<H>    Ca    8.3<L>      01 Jan 2022 09:36  Phos  3.4     12-31  Mg     2.0     12-31            RADIOLOGY & ADDITIONAL TESTS:

## 2022-01-01 NOTE — CONSULT NOTE ADULT - SUBJECTIVE AND OBJECTIVE BOX
[place] [facility]                                                        Office: [address] [address]                                                       Telephone: [phone]. Fax:[phone]                                                              CARDIOLOGY CONSULTATION NOTE                                                                                               History obtained by: Patient and medical record     obtained: No    Chief complaint:  dyspnea and lower extremity edema    HPI: Patient is a 62yo M with PMHx of CAD, severely Dilated CM/HFrEF s/p ICD, Substance abuse, atrial Flutter on Eliquis, ETOH use initially admitted on 12/24 with decompensated heart failure agfter c/o right arm and foot pain, swelling and increased shortness of breath, due to medication non-compliance.  Signed out on 12/31 against medical advice for personal reasons and readmitted overnight.  States he was getting evicted and needs to get all his belongings including a photo frame of him and his mother that means a lot to him.  He states that he dropped his stuff off at his friend's house, then took taxi back to the hospital.  Currently speaking in full sentences, admits to abdominal and LE edema, but denies CP, palpitations, HA, N/V, orthopnea, PND or leg pain.    REVIEW OF SYMPTOMS:   Cardiovascular: See HPI. No chest pain, + dyspnea, No syncope, No palpitations, No dizziness, No Orthopnea, No Paroxsymal nocturnal dyspnea;    Respiratory:  +Dyspnea, No cough,     Genitourinary: No dysuria, no hematuria;   Gastrointestinal: no nausea, no vomiting, no diarrhea, no abdominal pain. No dark color stool, no melena;   Neurological: No headache, no dizziness, no slurred speech;    Extremities: LE edema  Psychiatric: No agitation, no anxiety.  ALL OTHER REVIEW OF SYSTEMS ARE NEGATIVE.    ALLERGIES: No Known Allergies    Intolerances: pork (Other)    CURRENT MEDICATIONS:  aMIOdarone    Tablet 200 milliGRAM(s) Oral daily  buMETAnide 4 milliGRAM(s) Oral every 12 hours  metoprolol succinate  milliGRAM(s) Oral daily  gabapentin  apixaban  aspirin enteric coated  atorvastatin  glucagon  Injectable  insulin lispro (ADMELOG) corrective regimen sliding scale    HOME MEDICATIONS:  Spironolactone 25 mg oral tablet: Last Dose Taken:  , 1 tab(s) orally once a day  Bumetanide 2 mg oral tablet: Last Dose Taken:  , 2 tab(s) orally 2 times a day  Bisoprolol 5 mg oral tablet: Last Dose Taken:  , 1 tab(s) orally once a day  Aspirin 81 mg oral delayed release tablet: Last Dose Taken:  , 1 tab(s) orally once a day  Atorvastatin 40 mg oral tablet: Last Dose Taken:  , 1 tab(s) orally once a day (at bedtime)  Apixaban 5 mg oral tablet: Last Dose Taken:  , 1 tab(s) orally every 12 hours  Amiodarone 200 mg oral tablet: Last Dose Taken:  , 1 tab(s) orally once a day  Losartan 25 mg oral tablet: Last Dose Taken:  , 0.5 tab(s) orally once a day    PAST MEDICAL HISTORY  Heart failure, systolic  CAD (coronary artery disease)  Hypertension  Nonischemic cardiomyopathy    PAST SURGICAL HISTORY  No significant past surgical history    FAMILY HISTORY: FH: lung cancer    SOCIAL HISTORY: + smoking/alcohol/drugs    Vital Signs Last 24 Hrs  T(C): 36.9 (31 Dec 2021 22:40), Max: 36.9 (31 Dec 2021 22:40)  T(F): 98.4 (31 Dec 2021 22:40), Max: 98.4 (31 Dec 2021 22:40)  HR: 65 (01 Jan 2022 01:58) (65 - 74)  BP: 103/70 (01 Jan 2022 01:58) (103/70 - 108/73)  BP(mean): --  RR: 20 (01 Jan 2022 01:58) (18 - 20)  SpO2: 98% (01 Jan 2022 01:58) (94% - 99%)    PHYSICAL EXAM:  Constitutional: Comfortable and no acute distress   HEENT: Atraumatic, EOMI, neck is supple, +JVD   CNS: A&Ox3. No focal motor deficits  Respiratory: CTAB  Cardiovascular: S1S2 RRR. No murmur/rubs  Gastrointestinal: Soft, non distended, +Bowel sounds  Extremities: 1+ LE edema   Psychiatric: Calm, no agitation  Skin: warm to touch, no skin rash visualized to face, hands    Intake and output:     LABS:                        13.9   4.98  )-----------( 209      ( 31 Dec 2021 07:42 )             43.2     12-31    134<L>  |  94<L>  |  50.8<H>  ----------------------------<  102<H>  3.8   |  22.0  |  1.50<H>    Ca    8.4<L>      31 Dec 2021 07:42  Phos  3.4     12-31  Mg     2.0     12-31    TPro  7.3  /  Alb  4.1  /  TBili  1.3  /  DBili  0.6<H>  /  AST  43<H>  /  ALT  27  /  AlkPhos  253<H>  12-30    ;p-BNP=    INTERPRETATION OF TELEMETRY:  ECG: NSR @ 70 bpm    RADIOLOGY & ADDITIONAL STUDIES:     ECHO FINDINGS: Date: Summary:   1. Very limited study due to patient hemodynamic instability.   2. Left ventricular ejection fraction, by visual estimation, is <20% with dilated cardiomyopathy   3. Limited visualization of the left atrial appendage, no evidence of thrombus.    Micah Lua DO Electronically signed on 11/22/2021

## 2022-01-01 NOTE — CONSULT NOTE ADULT - ASSESSMENT
60 y/o with h/o chronic systolic HF ACC/AHA stage C, NICMP LVEF <20% LVIDd 6.96cm, nonobstructive CAD, s/p ICD, HTN, active tobacco use, signed out AMA last night and readmitted this morning with decompensated HF.  Recently admitted for acute decompensated HF in the setting of aflutter w/ RVR. He underwent successful VANESSA/DCCV on 11/22/21. This is his 4th hospitalization in the past year. Placed on Bumex/Milrinone gtt with good response.     Pertinent Cardiac Studies:  11/18/21 EKG Aflutter LAD. PRWP. NS T wave changes  11/20/21 LVEF <20% LVIDd 6.96cm VTI 5cm, RV severely enlarged with severely reduced systolic HF, sev biatrial enlargement, mod-sev MR, moderate TR, RVSP 38mmHg  11/22/21 VANESSA limited study, LVEF <20%, no FADUMO thrombus  2016 [facility] LM minor irreg, mid LAD 50%, LCx minor irreg, RCA prox 60% mid 85%

## 2022-01-01 NOTE — H&P ADULT - HISTORY OF PRESENT ILLNESS
61 year old male with history of CAD, Severely Dilated CM/HFrEF s/p ICD, Substance abuse. Atrial Flutter on Eliquis, ETOH use who presented to the ED with complaints of right arm and foot pain with increased shortness of breath due to medication non-compliance and admitted with decompensated heart failure on 12/24 then pt left against medical advice on 12/31 for personal matter. Pt returned this evening to be readmitted. Pt states he was getting evicted and needs to get all his belongings including a photo frame of him and his mother that means a lot to him. He states that he dropped his stuff off at his friend house then took taxi back to the hospital. currently stable without cp, sob, denies substance abuse or drinking alcoholic beverages.

## 2022-01-01 NOTE — PROGRESS NOTE ADULT - ASSESSMENT
61 yr old male with CAD, dilated cardiomyopathy, s/p ICD, atrial flutter on Eliquis, substance abuse was admitted initially from 12/24 for decompensated CHF, was on Milrinone and Bumex drips, left AMA on 12/31 and returned to the ED later in the day as he had to take care of personal matter. He reported shortness of breath and right arm and foot pain on presentation. Cardiology was consulted, he was placed on oral Bumex.  61 yr old male with CAD, dilated cardiomyopathy, s/p ICD, atrial flutter on Eliquis, substance abuse was admitted initially from 12/24 for decompensated CHF, was on Milrinone and Bumex drips, left AMA on 12/31 and returned to the ED later in the day as he had to take care of personal matter. He reported shortness of breath and right arm and foot pain on presentation. Cardiology was consulted, he was placed on oral Bumex.     1. Decompensated CHF s/p ICD:  Cardiology following  On PO Bumex  Continue statin, metoprolol    2. Atrial flutter:  Continue Amiodarone,  Metoprolol, Eliquis.    3. Diabetes mellitus:  Sliding scale coverage, monitor fingersticks.    4. DVT prophylaxis:  On Eliquis.    Social work eval for placement, discussed with patient.

## 2022-01-01 NOTE — PATIENT PROFILE ADULT - FALL HARM RISK - UNIVERSAL INTERVENTIONS
Bed in lowest position, wheels locked, appropriate side rails in place/Call bell, personal items and telephone in reach/Instruct patient to call for assistance before getting out of bed or chair/Non-slip footwear when patient is out of bed/[place] to call system/Physically safe environment - no spills, clutter or unnecessary equipment/Purposeful Proactive Rounding/Room/bathroom lighting operational, light cord in reach

## 2022-01-01 NOTE — CONSULT NOTE ADULT - PROBLEM SELECTOR RECOMMENDATION 9
Monitor on Tele, labs, ECG  - ACC/AHA stage C, NICMP (LVEF <20%, LVIDd 6.96cm)  Clinically hypervolemic, cool extremities. Improving.   Inotropes: Milrinone 0.125mcg/kg/min  Diuretics: stopped Bumex gtt and will start patient on oral diuretics   Please check BMP q12 hrs while on gtt. Maintain K+ >4.0 and Mg >2.0.  Monitor markers of perfusion daily including lactate, LFTs  Device: s/p ICD  Will reintroduce GDMT as tolerated after diuresis.   GDMT: bisoprolol 5mg daily (started on Toprol 100mg daily instead-should monitor BP). STOP aldactone 25mg daily. Plan to ACEi/ARB/ARNi if BP and renal function allows. SGLT2i as outpt.  Not candidate for advanced therapies due to smoking/cocaine use history and non-compliance.

## 2022-01-01 NOTE — ED ADULT NURSE REASSESSMENT NOTE - NS ED NURSE REASSESS COMMENT FT1
patient A&Ox3 in no acute distress no pain or disc at  this time no SOB at this time , continue to monitor

## 2022-01-01 NOTE — H&P ADULT - ASSESSMENT
61 year old male with history of CAD, Severely Dilated CM/HFrEF s/p ICD, Substance abuse. Atrial Flutter on Eliquis, ETOH use who presented to the ED with complaints of right arm and foot pain with increased shortness of breath due to medication non-compliance and admitted with decompensated heart failure on 12/24 then pt left against medical advice on 12/31 for personal matter.    Severely Dilated Cardiomyopathy/HFrEF  -Left AMA on 12/31, returned tonight   -s/p milrinone and bumex drips   -resumed Bumex 4mg bid   -holding ACE/ARB due to DERRICK  -Strict I/os, daily weights  -C/w toprol 100 mg daily    -as per chart tot candidate for advanced therapies due to smoking/cocaine use history and non-compliance  -cardiology reconsulted     Hypervolemic Hyponatremia in the setting of decompensated heart failure - resolved  - Na 134  - Urine lytes reviewed  - Continue diuretics as above  - Free water restriction    - Monitor I/os  - Renal consult appreciated    DERRICK on CKD stage 3  - Likely due to cardiorenal syndrome and use of ARB  - Baseline creatinine 1.5-1.6  - Creatinine uptrending to 1.5 after reinitiation of aldactone; hold bumex and aldactone   - UA bland, urine sodium < 30  - Continue to hold losartan, aldactone, bumex  - Continue sodium bicarb TID   - Avoid nephrotoxic agents and renally dose medications   - Renal recommendations appreciated    Atrial flutter  - Continue amiodarone and eliquis     CAD  - Asymptomatic  - Continue aspirin and statin      DM  - HbA1c 6.3  - ISS  - ADA diet    Neuropathy  - Continue gabapentin    Substance use  - Hx of cocaine use- patient stating has not used for long time   - UDS positive for THC and opioids  - Cessation discussed     Cirrhosis 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 Atrial Flutter on Eliquis, ETOH use who presented to the ED with complaints of right arm and foot pain with increased shortness of breath due to medication non-compliance and admitted with decompensated heart failure on 12/24 then pt left against medical advice on 12/31 for personal matter.    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

## 2022-01-02 LAB
ANION GAP SERPL CALC-SCNC: 15 MMOL/L — SIGNIFICANT CHANGE UP (ref 5–17)
ANISOCYTOSIS BLD QL: SLIGHT — SIGNIFICANT CHANGE UP
BASOPHILS # BLD AUTO: 0.05 K/UL — SIGNIFICANT CHANGE UP (ref 0–0.2)
BASOPHILS NFR BLD AUTO: 0.9 % — SIGNIFICANT CHANGE UP (ref 0–2)
BUN SERPL-MCNC: 53.8 MG/DL — HIGH (ref 8–20)
BURR CELLS BLD QL SMEAR: PRESENT — SIGNIFICANT CHANGE UP
CALCIUM SERPL-MCNC: 8 MG/DL — LOW (ref 8.6–10.2)
CHLORIDE SERPL-SCNC: 97 MMOL/L — LOW (ref 98–107)
CO2 SERPL-SCNC: 24 MMOL/L — SIGNIFICANT CHANGE UP (ref 22–29)
CREAT SERPL-MCNC: 1.57 MG/DL — HIGH (ref 0.5–1.3)
EOSINOPHIL # BLD AUTO: 0.2 K/UL — SIGNIFICANT CHANGE UP (ref 0–0.5)
EOSINOPHIL NFR BLD AUTO: 3.5 % — SIGNIFICANT CHANGE UP (ref 0–6)
GIANT PLATELETS BLD QL SMEAR: PRESENT — SIGNIFICANT CHANGE UP
GLUCOSE BLDC GLUCOMTR-MCNC: 110 MG/DL — HIGH (ref 70–99)
GLUCOSE BLDC GLUCOMTR-MCNC: 123 MG/DL — HIGH (ref 70–99)
GLUCOSE BLDC GLUCOMTR-MCNC: 139 MG/DL — HIGH (ref 70–99)
GLUCOSE SERPL-MCNC: 97 MG/DL — SIGNIFICANT CHANGE UP (ref 70–99)
HCT VFR BLD CALC: 41.4 % — SIGNIFICANT CHANGE UP (ref 39–50)
HGB BLD-MCNC: 13.1 G/DL — SIGNIFICANT CHANGE UP (ref 13–17)
LYMPHOCYTES # BLD AUTO: 0.44 K/UL — LOW (ref 1–3.3)
LYMPHOCYTES # BLD AUTO: 7.9 % — LOW (ref 13–44)
MAGNESIUM SERPL-MCNC: 2.1 MG/DL — SIGNIFICANT CHANGE UP (ref 1.8–2.6)
MANUAL SMEAR VERIFICATION: SIGNIFICANT CHANGE UP
MCHC RBC-ENTMCNC: 27.6 PG — SIGNIFICANT CHANGE UP (ref 27–34)
MCHC RBC-ENTMCNC: 31.6 GM/DL — LOW (ref 32–36)
MCV RBC AUTO: 87.3 FL — SIGNIFICANT CHANGE UP (ref 80–100)
MONOCYTES # BLD AUTO: 0.64 K/UL — SIGNIFICANT CHANGE UP (ref 0–0.9)
MONOCYTES NFR BLD AUTO: 11.4 % — SIGNIFICANT CHANGE UP (ref 2–14)
NEUTROPHILS # BLD AUTO: 4.03 K/UL — SIGNIFICANT CHANGE UP (ref 1.8–7.4)
NEUTROPHILS NFR BLD AUTO: 71.9 % — SIGNIFICANT CHANGE UP (ref 43–77)
OVALOCYTES BLD QL SMEAR: SLIGHT — SIGNIFICANT CHANGE UP
PHOSPHATE SERPL-MCNC: 3.6 MG/DL — SIGNIFICANT CHANGE UP (ref 2.4–4.7)
PLAT MORPH BLD: NORMAL — SIGNIFICANT CHANGE UP
PLATELET # BLD AUTO: 194 K/UL — SIGNIFICANT CHANGE UP (ref 150–400)
POIKILOCYTOSIS BLD QL AUTO: SLIGHT — SIGNIFICANT CHANGE UP
POTASSIUM SERPL-MCNC: 3.7 MMOL/L — SIGNIFICANT CHANGE UP (ref 3.5–5.3)
POTASSIUM SERPL-SCNC: 3.7 MMOL/L — SIGNIFICANT CHANGE UP (ref 3.5–5.3)
RBC # BLD: 4.74 M/UL — SIGNIFICANT CHANGE UP (ref 4.2–5.8)
RBC # FLD: 15.9 % — HIGH (ref 10.3–14.5)
RBC BLD AUTO: ABNORMAL
SODIUM SERPL-SCNC: 136 MMOL/L — SIGNIFICANT CHANGE UP (ref 135–145)
VARIANT LYMPHS # BLD: 4.4 % — SIGNIFICANT CHANGE UP (ref 0–6)
WBC # BLD: 5.61 K/UL — SIGNIFICANT CHANGE UP (ref 3.8–10.5)
WBC # FLD AUTO: 5.61 K/UL — SIGNIFICANT CHANGE UP (ref 3.8–10.5)

## 2022-01-02 PROCEDURE — 99233 SBSQ HOSP IP/OBS HIGH 50: CPT

## 2022-01-02 RX ORDER — BUMETANIDE 0.25 MG/ML
0.5 INJECTION INTRAMUSCULAR; INTRAVENOUS
Qty: 20 | Refills: 0 | Status: DISCONTINUED | OUTPATIENT
Start: 2022-01-02 | End: 2022-01-03

## 2022-01-02 RX ORDER — BUMETANIDE 0.25 MG/ML
1 INJECTION INTRAMUSCULAR; INTRAVENOUS ONCE
Refills: 0 | Status: COMPLETED | OUTPATIENT
Start: 2022-01-02 | End: 2022-01-02

## 2022-01-02 RX ADMIN — BUMETANIDE 4 MILLIGRAM(S): 0.25 INJECTION INTRAMUSCULAR; INTRAVENOUS at 05:24

## 2022-01-02 RX ADMIN — AMIODARONE HYDROCHLORIDE 200 MILLIGRAM(S): 400 TABLET ORAL at 05:24

## 2022-01-02 RX ADMIN — BUMETANIDE 2.5 MG/HR: 0.25 INJECTION INTRAMUSCULAR; INTRAVENOUS at 17:07

## 2022-01-02 RX ADMIN — ATORVASTATIN CALCIUM 40 MILLIGRAM(S): 80 TABLET, FILM COATED ORAL at 21:20

## 2022-01-02 RX ADMIN — GABAPENTIN 100 MILLIGRAM(S): 400 CAPSULE ORAL at 05:28

## 2022-01-02 RX ADMIN — Medication 81 MILLIGRAM(S): at 11:02

## 2022-01-02 RX ADMIN — Medication 100 MILLIGRAM(S): at 05:53

## 2022-01-02 RX ADMIN — GABAPENTIN 100 MILLIGRAM(S): 400 CAPSULE ORAL at 15:05

## 2022-01-02 RX ADMIN — APIXABAN 5 MILLIGRAM(S): 2.5 TABLET, FILM COATED ORAL at 17:07

## 2022-01-02 RX ADMIN — BUMETANIDE 1 MILLIGRAM(S): 0.25 INJECTION INTRAMUSCULAR; INTRAVENOUS at 17:07

## 2022-01-02 RX ADMIN — GABAPENTIN 100 MILLIGRAM(S): 400 CAPSULE ORAL at 21:19

## 2022-01-02 RX ADMIN — APIXABAN 5 MILLIGRAM(S): 2.5 TABLET, FILM COATED ORAL at 05:24

## 2022-01-02 RX ADMIN — BUMETANIDE 2.5 MG/HR: 0.25 INJECTION INTRAMUSCULAR; INTRAVENOUS at 21:19

## 2022-01-02 NOTE — PROGRESS NOTE ADULT - SUBJECTIVE AND OBJECTIVE BOX
[facility]                                                         CARDIOLOGY AT [facility]                                                                  [address], [place] [address]                                                         Telephone: [phone]. Fax:[phone]                                                                             PROGRESS NOTE    Reason for follow up: Heart failure   Update:  c/o difficulty breathing. States went to sleep woke up because i couldn't breath.       Review of symptoms:   Cardiac:  No chest pain. No dyspnea. No palpitations. + orthopnea  Respiratory: no cough. No dyspnea  Gastrointestinal: No diarrhea. No abdominal pain. No bleeding.   Neuro: No focal neuro complaints.      Vitals:  T(C): 36.8 (01-02-22 @ 05:30), Max: 37.3 (01-01-22 @ 16:00)  HR: 70 (01-02-22 @ 05:30) (61 - 73)  BP: 132/78 (01-02-22 @ 05:30) (97/73 - 135/72)  RR: 18 (01-02-22 @ 05:30) (18 - 20)  SpO2: 98% (01-02-22 @ 05:30) (95% - 98%)      Weight (kg): 72.6 (12-29 @ 10:28)      PHYSICAL EXAM:  Appearance: Comfortable. No acute distress  HEENT:  Atraumatic. Normocephalic.  Normal oral mucosa  Neurologic: A & O x 3, no focal deficits. EOMI.  Cardiovascular: Normal S1 S2, No murmur, no rubs/gallops.   Respiratory: R side diminished, unlabored   Gastrointestinal:  Soft, Non-tender, + BS  Lower Extremities: + edema, Ace wraps b/l LE   Psychiatry: Patient is calm. No agitation.   Skin: No rashes/ ecchymoses/cyanosis/ulcers visualized on the face, hands or feet.      CURRENT MEDICATIONS:  aMIOdarone    Tablet 200 milliGRAM(s) Oral daily  buMETAnide 4 milliGRAM(s) Oral every 12 hours  metoprolol succinate  milliGRAM(s) Oral daily    gabapentin  atorvastatin  dextrose 40% Gel  dextrose 50% Injectable  glucagon  Injectable  insulin lispro (ADMELOG) corrective regimen sliding scale  apixaban  aspirin enteric coated  dextrose 5%.  influenza   Vaccine        LABS:	 	             13.1   5.61  )-----------( 194      ( 02 Jan 2022 03:41 )             41.4     01-02    136  |  97<L>  |  53.8<H>  ----------------------------<  97  3.7   |  24.0  |  1.57<H>    Ca    8.0<L>      02 Jan 2022 03:41  Phos  3.6     01-02  Mg     2.1     01-02      TELEMETRY: SR rate 69      DIAGNOSTIC TESTING:  [ ] Echocardiogram:   < from: TTE Echo Complete w/o Contrast w/ Doppler (11.20.21 @ 08:33) >    Summary:   1. Left ventricular ejection fraction, by visual estimation, is <20%.   2. Technically good study.   3. Severely enlarged left atrium.   4. Severely enlarged right atrium.   5. Severely enlarged right ventricle.   6. Severely reduced RV systolic function.   7. Moderate to severe mitral valve regurgitation.   8. Thickening of the anterior mitral valve leaflet.   9. Moderate tricuspid regurgitation.  10. Estimated pulmonary artery systolic pressure is 37.8 mmHg assuming a right atrial pressure of 8 mmHg, which is consistent with borderline pulmonary hypertension.    MD Greyson Electronically signed on 11/20/2021 at 12:51:39 PM    < end of copied text >

## 2022-01-02 NOTE — PROGRESS NOTE ADULT - SUBJECTIVE AND OBJECTIVE BOX
INTERVAL HPI/OVERNIGHT EVENTS:    CC: decompensated CHF, substance abuse, CAD, atrial flutter      Feels better now, earlier this am had sudden onset of shortness of breath per patient which improved when he lay on his side. Denies cough or chest pain.    Vital Signs Last 24 Hrs  T(C): 36.7 (02 Jan 2022 12:53), Max: 37.3 (01 Jan 2022 16:00)  T(F): 98.1 (02 Jan 2022 12:53), Max: 99.2 (01 Jan 2022 16:00)  HR: 71 (02 Jan 2022 12:53) (61 - 71)  BP: 110/83 (02 Jan 2022 12:53) (106/69 - 135/72)  BP(mean): --  RR: 18 (02 Jan 2022 12:53) (18 - 18)  SpO2: 98% (02 Jan 2022 12:53) (97% - 99%)    PHYSICAL EXAM:    GENERAL: alert, not in distress  CHEST/LUNG: b/l air entry, decreased in bases  HEART: reg  ABDOMEN: distended, soft, non tender  EXTREMITIES:  2+ edema, non tender.    MEDICATIONS  (STANDING):  aMIOdarone    Tablet 200 milliGRAM(s) Oral daily  apixaban 5 milliGRAM(s) Oral every 12 hours  aspirin enteric coated 81 milliGRAM(s) Oral daily  atorvastatin 40 milliGRAM(s) Oral at bedtime  buMETAnide Infusion 0.5 mG/Hr (2.5 mL/Hr) IV Continuous <Continuous>  buMETAnide Injectable 1 milliGRAM(s) IV Push once  dextrose 40% Gel 15 Gram(s) Oral once  dextrose 5%. 1000 milliLiter(s) (50 mL/Hr) IV Continuous <Continuous>  dextrose 5%. 1000 milliLiter(s) (100 mL/Hr) IV Continuous <Continuous>  dextrose 50% Injectable 25 Gram(s) IV Push once  dextrose 50% Injectable 12.5 Gram(s) IV Push once  dextrose 50% Injectable 25 Gram(s) IV Push once  gabapentin 100 milliGRAM(s) Oral three times a day  glucagon  Injectable 1 milliGRAM(s) IntraMuscular once  influenza   Vaccine 0.5 milliLiter(s) IntraMuscular once  insulin lispro (ADMELOG) corrective regimen sliding scale   SubCutaneous three times a day before meals  metoprolol succinate  milliGRAM(s) Oral daily    MEDICATIONS  (PRN):  acetaminophen     Tablet .. 650 milliGRAM(s) Oral every 6 hours PRN Temp greater or equal to 38C (100.4F), Mild Pain (1 - 3)  aluminum hydroxide/magnesium hydroxide/simethicone Suspension 30 milliLiter(s) Oral every 4 hours PRN Dyspepsia  melatonin 3 milliGRAM(s) Oral at bedtime PRN Insomnia  ondansetron Injectable 4 milliGRAM(s) IV Push every 8 hours PRN Nausea and/or Vomiting  traMADol 25 milliGRAM(s) Oral every 6 hours PRN Severe Pain (7 - 10)      Allergies    No Known Allergies    Intolerances    pork (Other)        LABS:                          13.1   5.61  )-----------( 194      ( 02 Jan 2022 03:41 )             41.4     01-02    136  |  97<L>  |  53.8<H>  ----------------------------<  97  3.7   |  24.0  |  1.57<H>    Ca    8.0<L>      02 Jan 2022 03:41  Phos  3.6     01-02  Mg     2.1     01-02            RADIOLOGY & ADDITIONAL TESTS:

## 2022-01-02 NOTE — PROGRESS NOTE ADULT - ASSESSMENT
61 yr old male with CAD, dilated cardiomyopathy, s/p ICD, atrial flutter on Eliquis, substance abuse was admitted initially from 12/24 for decompensated CHF, was on Milrinone and Bumex drips, left AMA on 12/31 and returned to the ED later in the day as he had to take care of personal matter. He reported shortness of breath and right arm and foot pain on presentation. Cardiology was consulted, he was placed on oral Bumex., subsequently transitioned to Bumex gtt. Patient reported he was recently evicted from his apartment and is homeless, social work consult placed.      1. Decompensated CHF s/p ICD:  Start Bumex gtt  daily weight and strict I/O, cardiology following.  Continue statin, metoprolol    2. Atrial flutter:  Continue Amiodarone,  Metoprolol, Eliquis.    3. Diabetes mellitus:  Sliding scale coverage, monitor fingersticks.    4. DVT prophylaxis:  On Eliquis.    Social work eval for placement, discussed with patient.

## 2022-01-02 NOTE — PROGRESS NOTE ADULT - ASSESSMENT
60 y/o with h/o chronic systolic HF ACC/AHA stage C, NICMP LVEF <20% LVIDd 6.96cm, nonobstructive CAD, s/p ICD, HTN, active tobacco use, signed out AMA last night and readmitted this morning with decompensated HF.  Recently admitted for acute decompensated HF in the setting of aflutter w/ RVR. He underwent successful VANESSA/DCCV on 11/22/21.     Pertinent Cardiac Studies:  11/18/21 EKG Aflutter LAD. PRWP. NS T wave changes  11/20/21 LVEF <20% LVIDd 6.96cm VTI 5cm, RV severely enlarged with severely reduced systolic HF, sev biatrial enlargement, mod-sev MR, moderate TR, RVSP 38mmHg  11/22/21 VANESSA limited study, LVEF <20%, no FADUMO thrombus  2016 [facility] LM minor irreg, mid LAD 50%, LCx minor irreg, RCA prox 60% mid 85%    Acute on Chronic HFrEF  previously on milrinone prior to AMA  cont bumex 4mg PO BID  cont metorpolol   volume overload  c/o orthopnea, remains without oxygen  Cr 1.57 today, hold on ARB/ARNi    Aflutter  s/p VANESSA  DCCV 11/22  cont Eliquis   cont amiodarone    DERRICK  monitor renal function closely  BMP daily, avoid nephrotoxic agents    Ascities  improving on diuretics  60 y/o with h/o chronic systolic HF ACC/AHA stage C, NICMP LVEF <20% LVIDd 6.96cm, nonobstructive CAD, s/p ICD, HTN, active tobacco use, signed out AMA last night and readmitted this morning with decompensated HF.  Recently admitted for acute decompensated HF in the setting of aflutter w/ RVR. He underwent successful VANESSA/DCCV on 11/22/21.     Pertinent Cardiac Studies:  11/18/21 EKG Aflutter LAD. PRWP. NS T wave changes  11/20/21 LVEF <20% LVIDd 6.96cm VTI 5cm, RV severely enlarged with severely reduced systolic HF, sev biatrial enlargement, mod-sev MR, moderate TR, RVSP 38mmHg  11/22/21 VANESAS limited study, LVEF <20%, no FADUMO thrombus  2016 [facility] LM minor irreg, mid LAD 50%, LCx minor irreg, RCA prox 60% mid 85%    Acute on Chronic HFrEF  previously on milrinone prior to AMA  pt refusing IV at this time  if agreeable change PO bumex to BUMEX gtt 1mg/hr  cont metorpolol   volume overload  c/o orthopnea, remains without oxygen  Cr 1.57 today, hold on ARB/ARNi    Aflutter  s/p VANESSA  DCCV 11/22  cont Eliquis   cont amiodarone    DERRICK  monitor renal function closely  BMP daily, avoid nephrotoxic agents    Ascities  improving on diuretics

## 2022-01-02 NOTE — PROGRESS NOTE ADULT - ATTENDING COMMENTS
Still volume overloaded. IV is out. only taking PO diuresis.   JVD and Leg edema nd ascitis.   discussed with patietn to increase his diuresis.   He is ok to get IV access now.  will put him on bumez drip. aggressive diuresis potasisum suppllment.  efrain [place]

## 2022-01-03 LAB
ANION GAP SERPL CALC-SCNC: 15 MMOL/L — SIGNIFICANT CHANGE UP (ref 5–17)
BUN SERPL-MCNC: 51 MG/DL — HIGH (ref 8–20)
CALCIUM SERPL-MCNC: 8.2 MG/DL — LOW (ref 8.6–10.2)
CHLORIDE SERPL-SCNC: 98 MMOL/L — SIGNIFICANT CHANGE UP (ref 98–107)
CO2 SERPL-SCNC: 22 MMOL/L — SIGNIFICANT CHANGE UP (ref 22–29)
CREAT SERPL-MCNC: 1.44 MG/DL — HIGH (ref 0.5–1.3)
DRUG SCREEN, SERUM: SIGNIFICANT CHANGE UP
GLUCOSE BLDC GLUCOMTR-MCNC: 111 MG/DL — HIGH (ref 70–99)
GLUCOSE BLDC GLUCOMTR-MCNC: 112 MG/DL — HIGH (ref 70–99)
GLUCOSE BLDC GLUCOMTR-MCNC: 130 MG/DL — HIGH (ref 70–99)
GLUCOSE BLDC GLUCOMTR-MCNC: 88 MG/DL — SIGNIFICANT CHANGE UP (ref 70–99)
GLUCOSE SERPL-MCNC: 100 MG/DL — HIGH (ref 70–99)
HCT VFR BLD CALC: 42 % — SIGNIFICANT CHANGE UP (ref 39–50)
HGB BLD-MCNC: 13.5 G/DL — SIGNIFICANT CHANGE UP (ref 13–17)
MAGNESIUM SERPL-MCNC: 2.2 MG/DL — SIGNIFICANT CHANGE UP (ref 1.6–2.6)
MCHC RBC-ENTMCNC: 27.7 PG — SIGNIFICANT CHANGE UP (ref 27–34)
MCHC RBC-ENTMCNC: 32.1 GM/DL — SIGNIFICANT CHANGE UP (ref 32–36)
MCV RBC AUTO: 86.1 FL — SIGNIFICANT CHANGE UP (ref 80–100)
PHOSPHATE SERPL-MCNC: 3.2 MG/DL — SIGNIFICANT CHANGE UP (ref 2.4–4.7)
PLATELET # BLD AUTO: 195 K/UL — SIGNIFICANT CHANGE UP (ref 150–400)
POTASSIUM SERPL-MCNC: 3.6 MMOL/L — SIGNIFICANT CHANGE UP (ref 3.5–5.3)
POTASSIUM SERPL-SCNC: 3.6 MMOL/L — SIGNIFICANT CHANGE UP (ref 3.5–5.3)
RBC # BLD: 4.88 M/UL — SIGNIFICANT CHANGE UP (ref 4.2–5.8)
RBC # FLD: 16.1 % — HIGH (ref 10.3–14.5)
SODIUM SERPL-SCNC: 135 MMOL/L — SIGNIFICANT CHANGE UP (ref 135–145)
WBC # BLD: 5.77 K/UL — SIGNIFICANT CHANGE UP (ref 3.8–10.5)
WBC # FLD AUTO: 5.77 K/UL — SIGNIFICANT CHANGE UP (ref 3.8–10.5)

## 2022-01-03 PROCEDURE — 99233 SBSQ HOSP IP/OBS HIGH 50: CPT

## 2022-01-03 RX ORDER — BUMETANIDE 0.25 MG/ML
1 INJECTION INTRAMUSCULAR; INTRAVENOUS
Qty: 20 | Refills: 0 | Status: DISCONTINUED | OUTPATIENT
Start: 2022-01-03 | End: 2022-01-05

## 2022-01-03 RX ADMIN — APIXABAN 5 MILLIGRAM(S): 2.5 TABLET, FILM COATED ORAL at 17:05

## 2022-01-03 RX ADMIN — GABAPENTIN 100 MILLIGRAM(S): 400 CAPSULE ORAL at 13:09

## 2022-01-03 RX ADMIN — ATORVASTATIN CALCIUM 40 MILLIGRAM(S): 80 TABLET, FILM COATED ORAL at 21:21

## 2022-01-03 RX ADMIN — AMIODARONE HYDROCHLORIDE 200 MILLIGRAM(S): 400 TABLET ORAL at 06:26

## 2022-01-03 RX ADMIN — Medication 100 MILLIGRAM(S): at 05:25

## 2022-01-03 RX ADMIN — APIXABAN 5 MILLIGRAM(S): 2.5 TABLET, FILM COATED ORAL at 05:26

## 2022-01-03 RX ADMIN — GABAPENTIN 100 MILLIGRAM(S): 400 CAPSULE ORAL at 06:26

## 2022-01-03 RX ADMIN — GABAPENTIN 100 MILLIGRAM(S): 400 CAPSULE ORAL at 21:22

## 2022-01-03 RX ADMIN — Medication 81 MILLIGRAM(S): at 11:03

## 2022-01-03 NOTE — PROGRESS NOTE ADULT - ATTENDING COMMENTS
Patient known to us. He left the hospital AMA because he was getting evicted and he needed to get all his belongings.   He is now more hypervolemic than when before he left.  Will need aggressive diuresis with inotrope assistance.   Needs SW c/s for placement, he is currently homeless.

## 2022-01-03 NOTE — PROGRESS NOTE ADULT - ASSESSMENT
60 y/o with h/o chronic systolic HF ACC/AHA stage C, NICMP LVEF <20% LVIDd 6.96cm, nonobstructive CAD, s/p ICD, HTN, active tobacco use, ?COPD who presented with decompensated HF. He reports being compliant with medications but not so much with low Na diet due to living in a group home.   Of note, the patient was recently admitted for acute decompensated HF in the setting of aflutter w/ RVR. He underwent successful VANESSA/DCCV on 11/22/21. This is his 4th hospitalization in the past year. He was started on bumex/milrinone gtt with good response.     Cards: Alison ([facility])    Pertinent Cardiac Studies  11/18/21 EKG Aflutter LAD. PRWP. NS T wave changes  11/20/21 LVEF <20% LVIDd 6.96cm VTI 5cm, RV severely enlarged with severely reduced systolic HF, sev biatrial enlargement, mod-sev MR, moderate TR, RVSP 38mmHg  11/22/21 VANESSA limited study, LVEF <20%, no FADUMO thrombus  2016 [facility] LM minor irreg, mid LAD 50%, LCx minor irreg, RCA prox 60% mid 85%

## 2022-01-03 NOTE — PROGRESS NOTE ADULT - SUBJECTIVE AND OBJECTIVE BOX
NOTE INCOMPLETE    [facility] Advanced Heart Failure  Office: [address] [place] [address]  Telephone: [phone]/Fax: [phone]    Subjective:    Medications:  acetaminophen     Tablet .. 650 milliGRAM(s) Oral every 6 hours PRN  aluminum hydroxide/magnesium hydroxide/simethicone Suspension 30 milliLiter(s) Oral every 4 hours PRN  aMIOdarone    Tablet 200 milliGRAM(s) Oral daily  apixaban 5 milliGRAM(s) Oral every 12 hours  aspirin enteric coated 81 milliGRAM(s) Oral daily  atorvastatin 40 milliGRAM(s) Oral at bedtime  buMETAnide Infusion 0.5 mG/Hr IV Continuous <Continuous>  dextrose 40% Gel 15 Gram(s) Oral once  dextrose 5%. 1000 milliLiter(s) IV Continuous <Continuous>  dextrose 5%. 1000 milliLiter(s) IV Continuous <Continuous>  dextrose 50% Injectable 25 Gram(s) IV Push once  dextrose 50% Injectable 12.5 Gram(s) IV Push once  dextrose 50% Injectable 25 Gram(s) IV Push once  gabapentin 100 milliGRAM(s) Oral three times a day  glucagon  Injectable 1 milliGRAM(s) IntraMuscular once  influenza   Vaccine 0.5 milliLiter(s) IntraMuscular once  insulin lispro (ADMELOG) corrective regimen sliding scale   SubCutaneous three times a day before meals  melatonin 3 milliGRAM(s) Oral at bedtime PRN  metoprolol succinate  milliGRAM(s) Oral daily  ondansetron Injectable 4 milliGRAM(s) IV Push every 8 hours PRN  traMADol 25 milliGRAM(s) Oral every 6 hours PRN    Vital Signs Last 24 Hours  T(C): 37 (01-03-22 @ 07:36), Max: 37.3 (01-02-22 @ 15:31)  HR: 66 (01-03-22 @ 07:36) (66 - 74)  BP: 110/81 (01-03-22 @ 07:36) (99/73 - 111/77)  RR: 18 (01-03-22 @ 07:36) (18 - 18)  SpO2: 93% (01-03-22 @ 07:36) (92% - 98%)    I&O's Summary    02 Jan 2022 07:01  -  03 Jan 2022 07:00  --------------------------------------------------------  IN: 722.5 mL / OUT: 300 mL / NET: 422.5 mL    Tele:    Physical Exam:  General: No distress. Comfortable.  HEENT: EOM intact.  Neck: Neck supple. JVP not elevated. No masses  Chest: Clear to auscultation bilaterally  CV: Normal S1 and S2. No murmurs, rub, or gallops. Radial pulses normal.  Abdomen: Soft, non-distended, non-tender  Skin: No rashes or skin breakdown  Neurology: Alert and oriented times three. Sensation intact  Psych: Affect normal    Labs:                        13.5   5.77  )-----------( 195      ( 03 Jan 2022 07:12 )             42.0     01-03    135  |  98  |  51.0<H>  ----------------------------<  100<H>  3.6   |  22.0  |  1.44<H>    Ca    8.2<L>      03 Jan 2022 07:12  Phos  3.2     01-03  Mg     2.2     01-03    Serum Pro-Brain Natriuretic Peptide: 86161 pg/mL (12-28 @ 05:48)    TTE Echo Complete w/o Contrast w/ Doppler (11.20.21 @ 08:33)   Summary:   1. Left ventricular ejection fraction, by visual estimation, is <20%.   2. Technically good study.   3. Severely enlarged left atrium.   4. Severely enlarged right atrium.   5. Severely enlarged right ventricle.   6. Severely reduced RV systolic function.   7. Moderate to severe mitral valve regurgitation.   8. Thickening of the anterior mitral valve leaflet.   9. Moderate tricuspid regurgitation.  10. Estimated pulmonary artery systolic pressure is 37.8 mmHg assuming a right atrial pressure of 8 mmHg, which is consistent with borderline pulmonary hypertension.    < end of copied text >               [facility] Advanced Heart Failure  Office: [address] [place] [address]  Telephone: [phone]/Fax: [phone]    Subjective: NAEO. Pt. denies CP, palpitations, SOB at rest.    Medications:  acetaminophen     Tablet .. 650 milliGRAM(s) Oral every 6 hours PRN  aluminum hydroxide/magnesium hydroxide/simethicone Suspension 30 milliLiter(s) Oral every 4 hours PRN  aMIOdarone    Tablet 200 milliGRAM(s) Oral daily  apixaban 5 milliGRAM(s) Oral every 12 hours  aspirin enteric coated 81 milliGRAM(s) Oral daily  atorvastatin 40 milliGRAM(s) Oral at bedtime  buMETAnide Infusion 0.5 mG/Hr IV Continuous <Continuous>  dextrose 40% Gel 15 Gram(s) Oral once  dextrose 5%. 1000 milliLiter(s) IV Continuous <Continuous>  dextrose 5%. 1000 milliLiter(s) IV Continuous <Continuous>  dextrose 50% Injectable 25 Gram(s) IV Push once  dextrose 50% Injectable 12.5 Gram(s) IV Push once  dextrose 50% Injectable 25 Gram(s) IV Push once  gabapentin 100 milliGRAM(s) Oral three times a day  glucagon  Injectable 1 milliGRAM(s) IntraMuscular once  influenza   Vaccine 0.5 milliLiter(s) IntraMuscular once  insulin lispro (ADMELOG) corrective regimen sliding scale   SubCutaneous three times a day before meals  melatonin 3 milliGRAM(s) Oral at bedtime PRN  metoprolol succinate  milliGRAM(s) Oral daily  ondansetron Injectable 4 milliGRAM(s) IV Push every 8 hours PRN  traMADol 25 milliGRAM(s) Oral every 6 hours PRN    Vital Signs Last 24 Hours  T(C): 37 (01-03-22 @ 07:36), Max: 37.3 (01-02-22 @ 15:31)  HR: 66 (01-03-22 @ 07:36) (66 - 74)  BP: 110/81 (01-03-22 @ 07:36) (99/73 - 111/77)  RR: 18 (01-03-22 @ 07:36) (18 - 18)  SpO2: 93% (01-03-22 @ 07:36) (92% - 98%)    I&O's Summary    02 Jan 2022 07:01  -  03 Jan 2022 07:00  --------------------------------------------------------  IN: 722.5 mL / OUT: 300 mL / NET: 422.5 mL    Tele: Sinus rhythm, rare PVC    Physical Exam:  General: No distress. Comfortable.  HEENT: EOMs intact.  Neck: Neck supple. JVP elevated.  Chest: RLL diminished  CV: RRR. Normal S1 and S2. No murmurs, rub, or gallops. Cool peripherally.  PV: 2+ B/L LE pitting edema upto knees. Palpable pulses in all four extremities.  Abdomen: abdominal ascites   Skin: No rashes, dry  Neurology: Alert and oriented times three. Sensation intact  Psych: Affect normal    Labs:                        13.5   5.77  )-----------( 195      ( 03 Jan 2022 07:12 )             42.0     01-03    135  |  98  |  51.0<H>  ----------------------------<  100<H>  3.6   |  22.0  |  1.44<H>    Ca    8.2<L>      03 Jan 2022 07:12  Phos  3.2     01-03  Mg     2.2     01-03    Serum Pro-Brain Natriuretic Peptide: 05421 pg/mL (12-28 @ 05:48)    TTE Echo Complete w/o Contrast w/ Doppler (11.20.21 @ 08:33)   Summary:   1. Left ventricular ejection fraction, by visual estimation, is <20%.   2. Technically good study.   3. Severely enlarged left atrium.   4. Severely enlarged right atrium.   5. Severely enlarged right ventricle.   6. Severely reduced RV systolic function.   7. Moderate to severe mitral valve regurgitation.   8. Thickening of the anterior mitral valve leaflet.   9. Moderate tricuspid regurgitation.  10. Estimated pulmonary artery systolic pressure is 37.8 mmHg assuming a right atrial pressure of 8 mmHg, which is consistent with borderline pulmonary hypertension.    < end of copied text >

## 2022-01-03 NOTE — PROGRESS NOTE ADULT - SUBJECTIVE AND OBJECTIVE BOX
INTERVAL HPI/OVERNIGHT EVENTS:    CC: decompensated CHF, substance abuse, CAD, atrial flutter    No new complaints, feels the same. No cough. No overnight events.    Vital Signs Last 24 Hrs  T(C): 36.7 (03 Jan 2022 15:24), Max: 37.2 (03 Jan 2022 04:15)  T(F): 98 (03 Jan 2022 15:24), Max: 99 (03 Jan 2022 04:15)  HR: 70 (03 Jan 2022 15:24) (66 - 70)  BP: 109/77 (03 Jan 2022 15:24) (108/72 - 111/77)  BP(mean): --  RR: 18 (03 Jan 2022 15:24) (18 - 18)  SpO2: 92% (03 Jan 2022 15:24) (92% - 93%)    PHYSICAL EXAM:    GENERAL: alert, not in distress  CHEST/LUNG: b/l air entry, decreased in bases  HEART: reg  ABDOMEN: soft, bs+  EXTREMITIES:  2+ edema, non tender    MEDICATIONS  (STANDING):  aMIOdarone    Tablet 200 milliGRAM(s) Oral daily  apixaban 5 milliGRAM(s) Oral every 12 hours  aspirin enteric coated 81 milliGRAM(s) Oral daily  atorvastatin 40 milliGRAM(s) Oral at bedtime  buMETAnide Infusion 1 mG/Hr (5 mL/Hr) IV Continuous <Continuous>  dextrose 40% Gel 15 Gram(s) Oral once  dextrose 5%. 1000 milliLiter(s) (50 mL/Hr) IV Continuous <Continuous>  dextrose 5%. 1000 milliLiter(s) (100 mL/Hr) IV Continuous <Continuous>  dextrose 50% Injectable 25 Gram(s) IV Push once  dextrose 50% Injectable 12.5 Gram(s) IV Push once  dextrose 50% Injectable 25 Gram(s) IV Push once  gabapentin 100 milliGRAM(s) Oral three times a day  glucagon  Injectable 1 milliGRAM(s) IntraMuscular once  influenza   Vaccine 0.5 milliLiter(s) IntraMuscular once  insulin lispro (ADMELOG) corrective regimen sliding scale   SubCutaneous three times a day before meals  metoprolol succinate  milliGRAM(s) Oral daily    MEDICATIONS  (PRN):  acetaminophen     Tablet .. 650 milliGRAM(s) Oral every 6 hours PRN Temp greater or equal to 38C (100.4F), Mild Pain (1 - 3)  aluminum hydroxide/magnesium hydroxide/simethicone Suspension 30 milliLiter(s) Oral every 4 hours PRN Dyspepsia  melatonin 3 milliGRAM(s) Oral at bedtime PRN Insomnia  ondansetron Injectable 4 milliGRAM(s) IV Push every 8 hours PRN Nausea and/or Vomiting  traMADol 25 milliGRAM(s) Oral every 6 hours PRN Severe Pain (7 - 10)      Allergies    No Known Allergies    Intolerances    pork (Other)        LABS:                          13.5   5.77  )-----------( 195      ( 03 Jan 2022 07:12 )             42.0     01-03    135  |  98  |  51.0<H>  ----------------------------<  100<H>  3.6   |  22.0  |  1.44<H>    Ca    8.2<L>      03 Jan 2022 07:12  Phos  3.2     01-03  Mg     2.2     01-03            RADIOLOGY & ADDITIONAL TESTS:

## 2022-01-03 NOTE — PROGRESS NOTE ADULT - PROBLEM SELECTOR PLAN 1
ACC/AHA stage C, NICMP (LVEF <20%, LVIDd 6.96cm)  Clinically hypervolemic, cool extremities.    Diuretics: Bumex gtt. resumed @ 0.5mg/hr on 1/2  Please check BMP q12 hrs while on gtt. Maintain K+ >4.0 and Mg >2.0.  Monitor markers of perfusion daily including lactate, LFTs  Device: s/p ICD  Will reintroduce GDMT as tolerated after diuresis.   GDMT: bisoprolol 5mg daily (started on Toprol 100mg daily instead-should monitor BP). Plan to ACEi/ARB/ARNi if BP and renal function allows. SGLT2i as outpt.  Not candidate for advanced therapies due to smoking/cocaine use history and non-compliance. ACC/AHA stage C, NICMP (LVEF <20%, LVIDd 6.96cm)  Clinically hypervolemic, cool extremities.    Recommend resuming inotropic support-Dobutamine 0.125mcg/kg/min.  Monitor markers of perfusion daily including lactate, LFTs  Diuretics: Increase Bumex infusion to 1mg/hr.   Please check BMP q12 hrs while on gtt. Maintain K+ >4.0 and Mg >2.0.  1.5L Fluid restriction, strict I&O and daily STANDING weights.  Device: s/p ICD   Will reintroduce GDMT as tolerated after diuresis.   GDMT: bisoprolol 5mg daily (started on Toprol 100mg daily instead-should monitor BP). Plan to ACEi/ARB/ARNi if BP and renal function allows. SGLT2i as outpt.  Not candidate for advanced therapies due to smoking/cocaine use history and non-compliance. ACC/AHA stage C, NICMP (LVEF <20%, LVIDd 6.96cm)  Clinically hypervolemic, cool extremities.    Recommend resuming inotropic support-Milrinone 0.125mcg/kg/min.  Monitor markers of perfusion daily including lactate, LFTs  Diuretics: Increase Bumex infusion to 1mg/hr.   Please check BMP q12 hrs while on gtt. Maintain K+ >4.0 and Mg >2.0.  1.5L Fluid restriction, strict I&O and daily STANDING weights.  Device: s/p ICD   Will reintroduce GDMT as tolerated after diuresis.   GDMT: bisoprolol 5mg daily (started on Toprol 100mg daily instead-should monitor BP). Plan to ACEi/ARB/ARNi if BP and renal function allows. SGLT2i as outpt.  Not candidate for advanced therapies due to smoking/cocaine use history and non-compliance. ACC/AHA stage C, NICMP (LVEF <20%, LVIDd 6.96cm)  Clinically hypervolemic, cool extremities.    Recommend resuming inotropic support: Milrinone 0.125mcg/kg/min.  Monitor markers of perfusion daily including lactate, LFTs  Diuretics: Increase Bumex infusion to 1mg/hr.   Please check BMP q12 hrs while on gtt. Maintain K+ >4.0 and Mg >2.0.  1.5L Fluid restriction, strict I&O and daily STANDING weights.  Device: s/p ICD   Will reintroduce GDMT as tolerated after diuresis.   GDMT: bisoprolol 5mg daily (started on Toprol 100mg daily instead-should monitor BP). Plan to ACEi/ARB/ARNi if BP and renal function allows. SGLT2i as outpt.  Not candidate for advanced therapies due to smoking/cocaine use history and non-compliance.

## 2022-01-03 NOTE — PROGRESS NOTE ADULT - ASSESSMENT
61 yr old male with CAD, dilated cardiomyopathy, s/p ICD, atrial flutter on Eliquis, substance abuse was admitted initially from 12/24 for decompensated CHF, was on Milrinone and Bumex drips, left AMA on 12/31 and returned to the ED later in the day as he had to take care of personal matter. He reported shortness of breath and right arm and foot pain on presentation. Cardiology was consulted, he was placed on oral Bumex., subsequently transitioned to Bumex gtt. Patient reported he was recently evicted from his apartment and is homeless, social work consult placed.      1. Decompensated CHF s/p ICD:  On  Bumex gtt  Start Milrinone as per cardiology.  daily weight and strict I/O, cardiology following.  Continue statin, metoprolol    2. Atrial flutter:  Continue Amiodarone,  Metoprolol, Eliquis.    3. Diabetes mellitus:  Sliding scale coverage, monitor fingersticks.    4. DVT prophylaxis:  On Eliquis.    Discussed with patient and cardiology.

## 2022-01-04 LAB
ANION GAP SERPL CALC-SCNC: 17 MMOL/L — SIGNIFICANT CHANGE UP (ref 5–17)
BUN SERPL-MCNC: 50.4 MG/DL — HIGH (ref 8–20)
CALCIUM SERPL-MCNC: 7.9 MG/DL — LOW (ref 8.6–10.2)
CHLORIDE SERPL-SCNC: 99 MMOL/L — SIGNIFICANT CHANGE UP (ref 98–107)
CO2 SERPL-SCNC: 21 MMOL/L — LOW (ref 22–29)
CREAT SERPL-MCNC: 1.35 MG/DL — HIGH (ref 0.5–1.3)
GLUCOSE BLDC GLUCOMTR-MCNC: 121 MG/DL — HIGH (ref 70–99)
GLUCOSE BLDC GLUCOMTR-MCNC: 131 MG/DL — HIGH (ref 70–99)
GLUCOSE BLDC GLUCOMTR-MCNC: 154 MG/DL — HIGH (ref 70–99)
GLUCOSE BLDC GLUCOMTR-MCNC: 99 MG/DL — SIGNIFICANT CHANGE UP (ref 70–99)
GLUCOSE SERPL-MCNC: 118 MG/DL — HIGH (ref 70–99)
POTASSIUM SERPL-MCNC: 4 MMOL/L — SIGNIFICANT CHANGE UP (ref 3.5–5.3)
POTASSIUM SERPL-SCNC: 4 MMOL/L — SIGNIFICANT CHANGE UP (ref 3.5–5.3)
SODIUM SERPL-SCNC: 136 MMOL/L — SIGNIFICANT CHANGE UP (ref 135–145)

## 2022-01-04 PROCEDURE — 99233 SBSQ HOSP IP/OBS HIGH 50: CPT

## 2022-01-04 RX ORDER — MILRINONE LACTATE 1 MG/ML
0.12 INJECTION, SOLUTION INTRAVENOUS
Qty: 20 | Refills: 0 | Status: DISCONTINUED | OUTPATIENT
Start: 2022-01-04 | End: 2022-01-05

## 2022-01-04 RX ADMIN — Medication 81 MILLIGRAM(S): at 15:56

## 2022-01-04 RX ADMIN — BUMETANIDE 5 MG/HR: 0.25 INJECTION INTRAMUSCULAR; INTRAVENOUS at 11:23

## 2022-01-04 RX ADMIN — GABAPENTIN 100 MILLIGRAM(S): 400 CAPSULE ORAL at 05:31

## 2022-01-04 RX ADMIN — APIXABAN 5 MILLIGRAM(S): 2.5 TABLET, FILM COATED ORAL at 16:20

## 2022-01-04 RX ADMIN — MILRINONE LACTATE 2.81 MICROGRAM(S)/KG/MIN: 1 INJECTION, SOLUTION INTRAVENOUS at 16:18

## 2022-01-04 RX ADMIN — GABAPENTIN 100 MILLIGRAM(S): 400 CAPSULE ORAL at 15:56

## 2022-01-04 RX ADMIN — ATORVASTATIN CALCIUM 40 MILLIGRAM(S): 80 TABLET, FILM COATED ORAL at 21:16

## 2022-01-04 RX ADMIN — BUMETANIDE 5 MG/HR: 0.25 INJECTION INTRAMUSCULAR; INTRAVENOUS at 20:00

## 2022-01-04 RX ADMIN — Medication 100 MILLIGRAM(S): at 05:31

## 2022-01-04 RX ADMIN — BUMETANIDE 5 MG/HR: 0.25 INJECTION INTRAMUSCULAR; INTRAVENOUS at 00:18

## 2022-01-04 RX ADMIN — APIXABAN 5 MILLIGRAM(S): 2.5 TABLET, FILM COATED ORAL at 05:32

## 2022-01-04 RX ADMIN — AMIODARONE HYDROCHLORIDE 200 MILLIGRAM(S): 400 TABLET ORAL at 05:32

## 2022-01-04 RX ADMIN — MILRINONE LACTATE 2.81 MICROGRAM(S)/KG/MIN: 1 INJECTION, SOLUTION INTRAVENOUS at 20:00

## 2022-01-04 RX ADMIN — Medication 3 MILLIGRAM(S): at 21:17

## 2022-01-04 RX ADMIN — GABAPENTIN 100 MILLIGRAM(S): 400 CAPSULE ORAL at 21:16

## 2022-01-04 NOTE — PROGRESS NOTE ADULT - PROBLEM SELECTOR PLAN 1
ACC/AHA stage C, NICMP (LVEF <20%, LVIDd 6.96cm)  Clinically hypervolemic, cool extremities.    Recommend resuming inotropic support: Milrinone 0.125mcg/kg/min.  Monitor markers of perfusion daily including lactate, LFTs  Diuretics: Increase Bumex infusion to 1mg/hr.   Please check BMP q12 hrs while on gtt. Maintain K+ >4.0 and Mg >2.0.  1.5L Fluid restriction, strict I&O and daily STANDING weights.  Device: s/p ICD   Will reintroduce GDMT as tolerated after diuresis.   GDMT: bisoprolol 5mg daily (started on Toprol 100mg daily instead-should monitor BP). Plan to ACEi/ARB/ARNi if BP and renal function allows. SGLT2i as outpt.  Not candidate for advanced therapies due to smoking/cocaine use history and non-compliance. ACC/AHA stage C, NICMP (LVEF <20%, LVIDd 6.96cm)  Clinically hypervolemic, cool extremities.    Recommend resuming inotropic support: Milrinone 0.125mcg/kg/min (not started yet)  Monitor markers of perfusion daily including lactate, LFTs  Diuretics: Increase Bumex infusion 1 mg/hr.   Please check BMP q12 hrs while on gtt. Maintain K+ >4.0 and Mg >2.0.  1.5L Fluid restriction, strict I&O and daily STANDING weights.  Device: s/p ICD   Will reintroduce GDMT as tolerated after diuresis.   GDMT: bisoprolol 5mg daily (started on Toprol 100mg daily instead-should monitor BP). Plan to ACEi/ARB/ARNi if BP and renal function allows. SGLT2i as outpt.  Not candidate for advanced therapies due to smoking/cocaine use history and non-compliance.

## 2022-01-04 NOTE — PROGRESS NOTE ADULT - SUBJECTIVE AND OBJECTIVE BOX
INTERVAL HPI/OVERNIGHT EVENTS:    CC: : decompensated CHF, substance abuse, CAD, atrial flutter    No overnight events, was short of breath prior to my assessment patient reports, feels better on supplemental oxygen. saturating 98% on 2l      Vital Signs Last 24 Hrs  T(C): 36.7 (04 Jan 2022 11:17), Max: 37 (04 Jan 2022 04:29)  T(F): 98 (04 Jan 2022 11:17), Max: 98.6 (04 Jan 2022 04:29)  HR: 68 (04 Jan 2022 11:17) (68 - 96)  BP: 105/76 (04 Jan 2022 11:17) (90/63 - 113/69)  BP(mean): --  RR: 18 (04 Jan 2022 11:17) (18 - 18)  SpO2: 94% (04 Jan 2022 11:17) (91% - 96%)    PHYSICAL EXAM:    GENERAL: alert, not in distress  CHEST/LUNG: b/l air entry, decreased in bases  HEART: reg  ABDOMEN: soft, bs+  EXTREMITIES:  2+ edema, non tender    MEDICATIONS  (STANDING):  aMIOdarone    Tablet 200 milliGRAM(s) Oral daily  apixaban 5 milliGRAM(s) Oral every 12 hours  aspirin enteric coated 81 milliGRAM(s) Oral daily  atorvastatin 40 milliGRAM(s) Oral at bedtime  buMETAnide Infusion 1 mG/Hr (5 mL/Hr) IV Continuous <Continuous>  dextrose 40% Gel 15 Gram(s) Oral once  dextrose 5%. 1000 milliLiter(s) (50 mL/Hr) IV Continuous <Continuous>  dextrose 5%. 1000 milliLiter(s) (100 mL/Hr) IV Continuous <Continuous>  dextrose 50% Injectable 25 Gram(s) IV Push once  dextrose 50% Injectable 12.5 Gram(s) IV Push once  dextrose 50% Injectable 25 Gram(s) IV Push once  gabapentin 100 milliGRAM(s) Oral three times a day  glucagon  Injectable 1 milliGRAM(s) IntraMuscular once  influenza   Vaccine 0.5 milliLiter(s) IntraMuscular once  insulin lispro (ADMELOG) corrective regimen sliding scale   SubCutaneous three times a day before meals  metoprolol succinate  milliGRAM(s) Oral daily  milrinone Infusion 0.125 MICROgram(s)/kG/Min (2.81 mL/Hr) IV Continuous <Continuous>    MEDICATIONS  (PRN):  acetaminophen     Tablet .. 650 milliGRAM(s) Oral every 6 hours PRN Temp greater or equal to 38C (100.4F), Mild Pain (1 - 3)  aluminum hydroxide/magnesium hydroxide/simethicone Suspension 30 milliLiter(s) Oral every 4 hours PRN Dyspepsia  melatonin 3 milliGRAM(s) Oral at bedtime PRN Insomnia  ondansetron Injectable 4 milliGRAM(s) IV Push every 8 hours PRN Nausea and/or Vomiting  traMADol 25 milliGRAM(s) Oral every 6 hours PRN Severe Pain (7 - 10)      Allergies    No Known Allergies    Intolerances    pork (Other)        LABS:                          13.5   5.77  )-----------( 195      ( 03 Jan 2022 07:12 )             42.0     01-04    136  |  99  |  50.4<H>  ----------------------------<  118<H>  4.0   |  21.0<L>  |  1.35<H>    Ca    7.9<L>      04 Jan 2022 09:13  Phos  3.2     01-03  Mg     2.2     01-03            RADIOLOGY & ADDITIONAL TESTS:

## 2022-01-04 NOTE — PROGRESS NOTE ADULT - ASSESSMENT
60 y/o with h/o chronic systolic HF ACC/AHA stage C, NICMP LVEF <20% LVIDd 6.96cm, nonobstructive CAD, s/p ICD, HTN, active tobacco use, ?COPD who presented with decompensated HF. He reports being compliant with medications but not so much with low Na diet due to living in a group home.   Of note, the patient was recently admitted for acute decompensated HF in the setting of aflutter w/ RVR. He underwent successful VANESSA/DCCV on 11/22/21. This is his 4th hospitalization in the past year. He was started on bumex/milrinone gtt with good response.     Cards: Alison ([facility])    Pertinent Cardiac Studies  11/18/21 EKG Aflutter LAD. PRWP. NS T wave changes  11/20/21 LVEF <20% LVIDd 6.96cm VTI 5cm, RV severely enlarged with severely reduced systolic HF, sev biatrial enlargement, mod-sev MR, moderate TR, RVSP 38mmHg  11/22/21 VANESSA limited study, LVEF <20%, no FADUMO thrombus  2016 [facility] LM minor irreg, mid LAD 50%, LCx minor irreg, RCA prox 60% mid 85%

## 2022-01-04 NOTE — PROGRESS NOTE ADULT - SUBJECTIVE AND OBJECTIVE BOX
Subjective:  INCOMPLETE NOTe    Medications:  acetaminophen     Tablet .. 650 milliGRAM(s) Oral every 6 hours PRN  aluminum hydroxide/magnesium hydroxide/simethicone Suspension 30 milliLiter(s) Oral every 4 hours PRN  aMIOdarone    Tablet 200 milliGRAM(s) Oral daily  apixaban 5 milliGRAM(s) Oral every 12 hours  aspirin enteric coated 81 milliGRAM(s) Oral daily  atorvastatin 40 milliGRAM(s) Oral at bedtime  buMETAnide Infusion 1 mG/Hr IV Continuous <Continuous>  dextrose 40% Gel 15 Gram(s) Oral once  dextrose 5%. 1000 milliLiter(s) IV Continuous <Continuous>  dextrose 5%. 1000 milliLiter(s) IV Continuous <Continuous>  dextrose 50% Injectable 25 Gram(s) IV Push once  dextrose 50% Injectable 12.5 Gram(s) IV Push once  dextrose 50% Injectable 25 Gram(s) IV Push once  gabapentin 100 milliGRAM(s) Oral three times a day  glucagon  Injectable 1 milliGRAM(s) IntraMuscular once  influenza   Vaccine 0.5 milliLiter(s) IntraMuscular once  insulin lispro (ADMELOG) corrective regimen sliding scale   SubCutaneous three times a day before meals  melatonin 3 milliGRAM(s) Oral at bedtime PRN  metoprolol succinate  milliGRAM(s) Oral daily  ondansetron Injectable 4 milliGRAM(s) IV Push every 8 hours PRN  traMADol 25 milliGRAM(s) Oral every 6 hours PRN    Vitals:  T(C): 36.6 (01-04-22 @ 07:24), Max: 37.2 (01-03-22 @ 11:05)  HR: 96 (01-04-22 @ 07:24) (66 - 96)  BP: 107/73 (01-04-22 @ 07:24) (90/63 - 113/69)  BP(mean): --  RR: 18 (01-04-22 @ 07:24) (18 - 18)  SpO2: 94% (01-04-22 @ 07:24) (91% - 96%)    Daily     Daily         I&O's Summary      Physical Exam:  Appearance: No Acute Distress  HEENT: JVP non elevated  Cardiovascular: RRR, Normal S1 S2, No murmurs/rubs/gallops  Respiratory: Clear to auscultation bilaterally  Gastrointestinal: Soft, Non-tender, non-distended	  Skin: no skin lesions  Neurologic: Non-focal  Extremities: No LE edema, warm and well perfused  Psychiatry: A & O x 3, Mood & affect appropriate      Labs:                        13.5   5.77  )-----------( 195      ( 03 Jan 2022 07:12 )             42.0     01-03    135  |  98  |  51.0<H>  ----------------------------<  100<H>  3.6   |  22.0  |  1.44<H>    Ca    8.2<L>      03 Jan 2022 07:12  Phos  3.2     01-03  Mg     2.2     01-03                       Subjective:  No complaints.   Feels better, remains with LE edema.     Medications:  acetaminophen     Tablet .. 650 milliGRAM(s) Oral every 6 hours PRN  aluminum hydroxide/magnesium hydroxide/simethicone Suspension 30 milliLiter(s) Oral every 4 hours PRN  aMIOdarone    Tablet 200 milliGRAM(s) Oral daily  apixaban 5 milliGRAM(s) Oral every 12 hours  aspirin enteric coated 81 milliGRAM(s) Oral daily  atorvastatin 40 milliGRAM(s) Oral at bedtime  buMETAnide Infusion 1 mG/Hr IV Continuous <Continuous>  dextrose 40% Gel 15 Gram(s) Oral once  dextrose 5%. 1000 milliLiter(s) IV Continuous <Continuous>  dextrose 5%. 1000 milliLiter(s) IV Continuous <Continuous>  dextrose 50% Injectable 25 Gram(s) IV Push once  dextrose 50% Injectable 12.5 Gram(s) IV Push once  dextrose 50% Injectable 25 Gram(s) IV Push once  gabapentin 100 milliGRAM(s) Oral three times a day  glucagon  Injectable 1 milliGRAM(s) IntraMuscular once  influenza   Vaccine 0.5 milliLiter(s) IntraMuscular once  insulin lispro (ADMELOG) corrective regimen sliding scale   SubCutaneous three times a day before meals  melatonin 3 milliGRAM(s) Oral at bedtime PRN  metoprolol succinate  milliGRAM(s) Oral daily  ondansetron Injectable 4 milliGRAM(s) IV Push every 8 hours PRN  traMADol 25 milliGRAM(s) Oral every 6 hours PRN    Vitals:  T(C): 36.6 (01-04-22 @ 07:24), Max: 37.2 (01-03-22 @ 11:05)  HR: 96 (01-04-22 @ 07:24) (66 - 96)  BP: 107/73 (01-04-22 @ 07:24) (90/63 - 113/69)  BP(mean): --  RR: 18 (01-04-22 @ 07:24) (18 - 18)  SpO2: 94% (01-04-22 @ 07:24) (91% - 96%)    Daily     Daily         I&O's Summary      Physical Exam:  Appearance: No Acute Distress  HEENT: JVP 9cm  Cardiovascular: RRR, Normal S1 S2, No murmurs/rubs/gallops  Respiratory: decreased BS at the bases b/  Gastrointestinal: Soft, Non-tender, non-distended	  Skin: no skin lesions  Neurologic: Non-focal  Extremities: LE edema b/l up to knees, cool extremities  Psychiatry: A & O x 3, Mood & affect appropriate      Labs:                        13.5   5.77  )-----------( 195      ( 03 Jan 2022 07:12 )             42.0     01-03    135  |  98  |  51.0<H>  ----------------------------<  100<H>  3.6   |  22.0  |  1.44<H>    Ca    8.2<L>      03 Jan 2022 07:12  Phos  3.2     01-03  Mg     2.2     01-03

## 2022-01-04 NOTE — PROGRESS NOTE ADULT - ASSESSMENT
61 yr old male with CAD, dilated cardiomyopathy, s/p ICD, atrial flutter on Eliquis, substance abuse was admitted initially from 12/24 for decompensated CHF, was on Milrinone and Bumex drips, left AMA on 12/31 and returned to the ED later in the day as he had to take care of personal matter. He reported shortness of breath and right arm and foot pain on presentation. Cardiology was consulted, he was placed on oral Bumex., subsequently transitioned to Bumex gtt. Patient reported he was recently evicted from his apartment and is homeless, social work consult placed.      1. Decompensated CHF s/p ICD:  On  Bumex gtt  Milrinone as per cardiology.  daily weight and strict I/O, cardiology following.  Continue statin, metoprolol  Supplemental oxygen  monitor lytes and renal function  continue telemetry    2. Atrial flutter:  Continue Amiodarone,  Metoprolol, Eliquis.    3. Diabetes mellitus:  Sliding scale coverage, monitor fingersticks.    4. DVT prophylaxis:  On Eliquis.    Discussed with patient.

## 2022-01-05 LAB
ANION GAP SERPL CALC-SCNC: 13 MMOL/L — SIGNIFICANT CHANGE UP (ref 5–17)
BUN SERPL-MCNC: 60.3 MG/DL — HIGH (ref 8–20)
CALCIUM SERPL-MCNC: 8.8 MG/DL — SIGNIFICANT CHANGE UP (ref 8.6–10.2)
CHLORIDE SERPL-SCNC: 96 MMOL/L — LOW (ref 98–107)
CO2 SERPL-SCNC: 27 MMOL/L — SIGNIFICANT CHANGE UP (ref 22–29)
CREAT SERPL-MCNC: 1.89 MG/DL — HIGH (ref 0.5–1.3)
GLUCOSE BLDC GLUCOMTR-MCNC: 100 MG/DL — HIGH (ref 70–99)
GLUCOSE BLDC GLUCOMTR-MCNC: 112 MG/DL — HIGH (ref 70–99)
GLUCOSE BLDC GLUCOMTR-MCNC: 124 MG/DL — HIGH (ref 70–99)
GLUCOSE BLDC GLUCOMTR-MCNC: 137 MG/DL — HIGH (ref 70–99)
GLUCOSE SERPL-MCNC: 121 MG/DL — HIGH (ref 70–99)
MAGNESIUM SERPL-MCNC: 2.4 MG/DL — SIGNIFICANT CHANGE UP (ref 1.6–2.6)
POTASSIUM SERPL-MCNC: 4.8 MMOL/L — SIGNIFICANT CHANGE UP (ref 3.5–5.3)
POTASSIUM SERPL-SCNC: 4.8 MMOL/L — SIGNIFICANT CHANGE UP (ref 3.5–5.3)
SODIUM SERPL-SCNC: 136 MMOL/L — SIGNIFICANT CHANGE UP (ref 135–145)

## 2022-01-05 PROCEDURE — 99233 SBSQ HOSP IP/OBS HIGH 50: CPT

## 2022-01-05 RX ORDER — BUMETANIDE 0.25 MG/ML
2 INJECTION INTRAMUSCULAR; INTRAVENOUS
Qty: 20 | Refills: 0 | Status: DISCONTINUED | OUTPATIENT
Start: 2022-01-05 | End: 2022-01-12

## 2022-01-05 RX ORDER — MILRINONE LACTATE 1 MG/ML
0.25 INJECTION, SOLUTION INTRAVENOUS
Qty: 20 | Refills: 0 | Status: DISCONTINUED | OUTPATIENT
Start: 2022-01-05 | End: 2022-01-13

## 2022-01-05 RX ADMIN — GABAPENTIN 100 MILLIGRAM(S): 400 CAPSULE ORAL at 05:33

## 2022-01-05 RX ADMIN — APIXABAN 5 MILLIGRAM(S): 2.5 TABLET, FILM COATED ORAL at 18:39

## 2022-01-05 RX ADMIN — BUMETANIDE 10 MG/HR: 0.25 INJECTION INTRAMUSCULAR; INTRAVENOUS at 14:26

## 2022-01-05 RX ADMIN — GABAPENTIN 100 MILLIGRAM(S): 400 CAPSULE ORAL at 15:18

## 2022-01-05 RX ADMIN — AMIODARONE HYDROCHLORIDE 200 MILLIGRAM(S): 400 TABLET ORAL at 05:33

## 2022-01-05 RX ADMIN — APIXABAN 5 MILLIGRAM(S): 2.5 TABLET, FILM COATED ORAL at 05:33

## 2022-01-05 RX ADMIN — MILRINONE LACTATE 5.63 MICROGRAM(S)/KG/MIN: 1 INJECTION, SOLUTION INTRAVENOUS at 14:27

## 2022-01-05 RX ADMIN — GABAPENTIN 100 MILLIGRAM(S): 400 CAPSULE ORAL at 21:06

## 2022-01-05 RX ADMIN — Medication 100 MILLIGRAM(S): at 05:33

## 2022-01-05 RX ADMIN — ATORVASTATIN CALCIUM 40 MILLIGRAM(S): 80 TABLET, FILM COATED ORAL at 21:06

## 2022-01-05 RX ADMIN — Medication 81 MILLIGRAM(S): at 15:18

## 2022-01-05 NOTE — PROGRESS NOTE ADULT - SUBJECTIVE AND OBJECTIVE BOX
INTERVAL HPI/OVERNIGHT EVENTS:    CC:  decompensated CHF, substance abuse, CAD, atrial flutter    No overnight events, feels the same. No chest pain. On supplemental oxygen.    Vital Signs Last 24 Hrs  T(C): 36.3 (05 Jan 2022 11:36), Max: 37 (05 Jan 2022 04:35)  T(F): 97.3 (05 Jan 2022 11:36), Max: 98.6 (05 Jan 2022 04:35)  HR: 70 (05 Jan 2022 11:36) (61 - 70)  BP: 117/79 (05 Jan 2022 11:36) (101/75 - 118/71)  BP(mean): --  RR: 18 (05 Jan 2022 11:36) (18 - 20)  SpO2: 92% (05 Jan 2022 11:36) (91% - 95%)    PHYSICAL EXAM:    GENERAL: alert, not in distress  CHEST/LUNG: b/l air entry, decreased in bases  HEART: reg  ABDOMEN: soft, non tender, bs+  EXTREMITIES:  2+ edema, non tender    MEDICATIONS  (STANDING):  aMIOdarone    Tablet 200 milliGRAM(s) Oral daily  apixaban 5 milliGRAM(s) Oral every 12 hours  aspirin enteric coated 81 milliGRAM(s) Oral daily  atorvastatin 40 milliGRAM(s) Oral at bedtime  buMETAnide Infusion 2 mG/Hr (10 mL/Hr) IV Continuous <Continuous>  dextrose 40% Gel 15 Gram(s) Oral once  dextrose 5%. 1000 milliLiter(s) (50 mL/Hr) IV Continuous <Continuous>  dextrose 5%. 1000 milliLiter(s) (100 mL/Hr) IV Continuous <Continuous>  dextrose 50% Injectable 25 Gram(s) IV Push once  dextrose 50% Injectable 12.5 Gram(s) IV Push once  dextrose 50% Injectable 25 Gram(s) IV Push once  gabapentin 100 milliGRAM(s) Oral three times a day  glucagon  Injectable 1 milliGRAM(s) IntraMuscular once  influenza   Vaccine 0.5 milliLiter(s) IntraMuscular once  insulin lispro (ADMELOG) corrective regimen sliding scale   SubCutaneous three times a day before meals  metoprolol succinate  milliGRAM(s) Oral daily  milrinone Infusion 0.25 MICROgram(s)/kG/Min (5.63 mL/Hr) IV Continuous <Continuous>    MEDICATIONS  (PRN):  acetaminophen     Tablet .. 650 milliGRAM(s) Oral every 6 hours PRN Temp greater or equal to 38C (100.4F), Mild Pain (1 - 3)  aluminum hydroxide/magnesium hydroxide/simethicone Suspension 30 milliLiter(s) Oral every 4 hours PRN Dyspepsia  melatonin 3 milliGRAM(s) Oral at bedtime PRN Insomnia  ondansetron Injectable 4 milliGRAM(s) IV Push every 8 hours PRN Nausea and/or Vomiting  traMADol 25 milliGRAM(s) Oral every 6 hours PRN Severe Pain (7 - 10)      Allergies    No Known Allergies    Intolerances    pork (Other)        LABS:      01-05    136  |  96<L>  |  60.3<H>  ----------------------------<  121<H>  4.8   |  27.0  |  1.89<H>    Ca    8.8      05 Jan 2022 04:31  Mg     2.4     01-05            RADIOLOGY & ADDITIONAL TESTS:

## 2022-01-05 NOTE — PROGRESS NOTE ADULT - SUBJECTIVE AND OBJECTIVE BOX
Subjective:  INCOMPLETE NOTE    Medications:  acetaminophen     Tablet .. 650 milliGRAM(s) Oral every 6 hours PRN  aluminum hydroxide/magnesium hydroxide/simethicone Suspension 30 milliLiter(s) Oral every 4 hours PRN  aMIOdarone    Tablet 200 milliGRAM(s) Oral daily  apixaban 5 milliGRAM(s) Oral every 12 hours  aspirin enteric coated 81 milliGRAM(s) Oral daily  atorvastatin 40 milliGRAM(s) Oral at bedtime  buMETAnide Infusion 1 mG/Hr IV Continuous <Continuous>  dextrose 40% Gel 15 Gram(s) Oral once  dextrose 5%. 1000 milliLiter(s) IV Continuous <Continuous>  dextrose 5%. 1000 milliLiter(s) IV Continuous <Continuous>  dextrose 50% Injectable 25 Gram(s) IV Push once  dextrose 50% Injectable 12.5 Gram(s) IV Push once  dextrose 50% Injectable 25 Gram(s) IV Push once  gabapentin 100 milliGRAM(s) Oral three times a day  glucagon  Injectable 1 milliGRAM(s) IntraMuscular once  influenza   Vaccine 0.5 milliLiter(s) IntraMuscular once  insulin lispro (ADMELOG) corrective regimen sliding scale   SubCutaneous three times a day before meals  melatonin 3 milliGRAM(s) Oral at bedtime PRN  metoprolol succinate  milliGRAM(s) Oral daily  milrinone Infusion 0.125 MICROgram(s)/kG/Min IV Continuous <Continuous>  ondansetron Injectable 4 milliGRAM(s) IV Push every 8 hours PRN  traMADol 25 milliGRAM(s) Oral every 6 hours PRN    Vitals:  T(C): 37 (01-05-22 @ 04:35), Max: 37 (01-05-22 @ 04:35)  HR: 61 (01-05-22 @ 04:35) (61 - 68)  BP: 118/71 (01-05-22 @ 04:35) (101/75 - 118/71)  BP(mean): --  RR: 18 (01-05-22 @ 04:35) (18 - 20)  SpO2: 95% (01-05-22 @ 04:35) (91% - 95%)    Daily     Daily     Weight (kg): 75 (01-04 @ 10:12)    I&O's Summary    04 Jan 2022 07:01  -  05 Jan 2022 07:00  --------------------------------------------------------  IN: 281.4 mL / OUT: 1345 mL / NET: -1063.6 mL        Physical Exam:  Appearance: No Acute Distress  HEENT: JVP non elevated  Cardiovascular: RRR, Normal S1 S2, No murmurs/rubs/gallops  Respiratory: Clear to auscultation bilaterally  Gastrointestinal: Soft, Non-tender, non-distended	  Skin: no skin lesions  Neurologic: Non-focal  Extremities: No LE edema, warm and well perfused  Psychiatry: A & O x 3, Mood & affect appropriate      Labs:    01-05    136  |  96<L>  |  60.3<H>  ----------------------------<  121<H>  4.8   |  27.0  |  1.89<H>    Ca    8.8      05 Jan 2022 04:31  Mg     2.4     01-05                       Subjective:  Feels 'okay'    Medications:  acetaminophen     Tablet .. 650 milliGRAM(s) Oral every 6 hours PRN  aluminum hydroxide/magnesium hydroxide/simethicone Suspension 30 milliLiter(s) Oral every 4 hours PRN  aMIOdarone    Tablet 200 milliGRAM(s) Oral daily  apixaban 5 milliGRAM(s) Oral every 12 hours  aspirin enteric coated 81 milliGRAM(s) Oral daily  atorvastatin 40 milliGRAM(s) Oral at bedtime  buMETAnide Infusion 1 mG/Hr IV Continuous <Continuous>  dextrose 40% Gel 15 Gram(s) Oral once  dextrose 5%. 1000 milliLiter(s) IV Continuous <Continuous>  dextrose 5%. 1000 milliLiter(s) IV Continuous <Continuous>  dextrose 50% Injectable 25 Gram(s) IV Push once  dextrose 50% Injectable 12.5 Gram(s) IV Push once  dextrose 50% Injectable 25 Gram(s) IV Push once  gabapentin 100 milliGRAM(s) Oral three times a day  glucagon  Injectable 1 milliGRAM(s) IntraMuscular once  influenza   Vaccine 0.5 milliLiter(s) IntraMuscular once  insulin lispro (ADMELOG) corrective regimen sliding scale   SubCutaneous three times a day before meals  melatonin 3 milliGRAM(s) Oral at bedtime PRN  metoprolol succinate  milliGRAM(s) Oral daily  milrinone Infusion 0.125 MICROgram(s)/kG/Min IV Continuous <Continuous>  ondansetron Injectable 4 milliGRAM(s) IV Push every 8 hours PRN  traMADol 25 milliGRAM(s) Oral every 6 hours PRN    Vitals:  T(C): 37 (01-05-22 @ 04:35), Max: 37 (01-05-22 @ 04:35)  HR: 61 (01-05-22 @ 04:35) (61 - 68)  BP: 118/71 (01-05-22 @ 04:35) (101/75 - 118/71)  BP(mean): --  RR: 18 (01-05-22 @ 04:35) (18 - 20)  SpO2: 95% (01-05-22 @ 04:35) (91% - 95%)    Daily     Daily     Weight (kg): 75 (01-04 @ 10:12)    I&O's Summary    04 Jan 2022 07:01  -  05 Jan 2022 07:00  --------------------------------------------------------  IN: 281.4 mL / OUT: 1345 mL / NET: -1063.6 mL        Physical Exam:  Appearance: No Acute Distress  HEENT: JVP non 11cm  Cardiovascular: RRR, Normal S1 S2, No murmurs/rubs/gallops  Respiratory: Clear to auscultation bilaterally  Gastrointestinal: Soft, Non-tender, mildly-distended	  Skin: no skin lesions  Neurologic: Non-focal  Extremities: +1 LE edema up to knees, lukewarm extremities  Psychiatry: A & O x 3, Mood & affect appropriate      Labs:    01-05    136  |  96<L>  |  60.3<H>  ----------------------------<  121<H>  4.8   |  27.0  |  1.89<H>    Ca    8.8      05 Jan 2022 04:31  Mg     2.4     01-05

## 2022-01-05 NOTE — PROGRESS NOTE ADULT - ASSESSMENT
62 y/o with h/o chronic systolic HF ACC/AHA stage C, NICMP LVEF <20% LVIDd 6.96cm, nonobstructive CAD, s/p ICD, HTN, active tobacco use, ?COPD who presented with decompensated HF. He reports being compliant with medications but not so much with low Na diet due to living in a group home.   Of note, the patient was recently admitted for acute decompensated HF in the setting of aflutter w/ RVR. He underwent successful VANESSA/DCCV on 11/22/21. This is his 4th hospitalization in the past year. He was started on bumex/milrinone gtt with good response.     Cards: Alison ([facility])    Pertinent Cardiac Studies  11/18/21 EKG Aflutter LAD. PRWP. NS T wave changes  11/20/21 LVEF <20% LVIDd 6.96cm VTI 5cm, RV severely enlarged with severely reduced systolic HF, sev biatrial enlargement, mod-sev MR, moderate TR, RVSP 38mmHg  11/22/21 VANESSA limited study, LVEF <20%, no FADUMO thrombus  2016 [facility] LM minor irreg, mid LAD 50%, LCx minor irreg, RCA prox 60% mid 85%

## 2022-01-05 NOTE — PROGRESS NOTE ADULT - PROBLEM SELECTOR PLAN 1
ACC/AHA stage C, NICMP (LVEF <20%, LVIDd 6.96cm)  Clinically hypervolemic, cool extremities.    Recommend resuming inotropic support: Milrinone 0.125mcg/kg/min (not started yet)  Monitor markers of perfusion daily including lactate, LFTs  Diuretics: Increase Bumex infusion 1 mg/hr.   Please check BMP q12 hrs while on gtt. Maintain K+ >4.0 and Mg >2.0.  1.5L Fluid restriction, strict I&O and daily STANDING weights.  Device: s/p ICD   Will reintroduce GDMT as tolerated after diuresis.   GDMT: bisoprolol 5mg daily (started on Toprol 100mg daily instead-should monitor BP). Plan to ACEi/ARB/ARNi if BP and renal function allows. SGLT2i as outpt.  Not candidate for advanced therapies due to smoking/cocaine use history and non-compliance. ACC/AHA stage C, NICMP (LVEF <20%, LVIDd 6.96cm)  Clinically hypervolemic, cool extremities.    Recommend resuming inotropic support: Milrinone increase to 0.250 mcg/kg/min  Monitor markers of perfusion daily including lactate, LFTs  Diuretics: Increase Bumex infusion increase to 2 mg/hr.   Please check BMP q12 hrs while on gtt. Maintain K+ >4.0 and Mg >2.0.  1.5L Fluid restriction, strict I&O and daily STANDING weights.  Device: s/p ICD   Will reintroduce GDMT as tolerated after diuresis.   GDMT: bisoprolol 5mg daily (started on Toprol 100mg daily instead-should monitor BP). Plan to ACEi/ARB/ARNi if BP and renal function allows. SGLT2i as outpt.  Not candidate for advanced therapies due to smoking/cocaine use history and non-compliance.

## 2022-01-05 NOTE — PROGRESS NOTE ADULT - ASSESSMENT
61 yr old male with CAD, dilated cardiomyopathy, s/p ICD, atrial flutter on Eliquis, substance abuse was admitted initially from 12/24 for decompensated CHF, was on Milrinone and Bumex drips, left AMA on 12/31 and returned to the ED later in the day as he had to take care of personal matter. He reported shortness of breath and right arm and foot pain on presentation. Cardiology was consulted, he was placed on oral Bumex., subsequently transitioned to Bumex gtt. Patient reported he was recently evicted from his apartment and is homeless, social work consult placed.      1. Decompensated CHF s/p ICD:  On Bumex and Milrinone, rates adjusted as per cardiology recommendations  in negative fluid balance today, but still with crackles on exam,  daily weight and strict I/O  Continue statin, metoprolol  Supplemental oxygen  monitor lytes and renal function  continue telemetry    2. Atrial flutter:  Continue Amiodarone,  Metoprolol, Eliquis.    3. Diabetes mellitus:  Sliding scale coverage, monitor fingersticks.    4. DVT prophylaxis:  On Eliquis.    5. DERRICK on CKD:  Likely pre renal sec to underlying cardiomyopathy  monitor renal function.    Discussed with patient and cardiology.

## 2022-01-06 LAB
ANION GAP SERPL CALC-SCNC: 13 MMOL/L — SIGNIFICANT CHANGE UP (ref 5–17)
BUN SERPL-MCNC: 46.6 MG/DL — HIGH (ref 8–20)
CALCIUM SERPL-MCNC: 6.9 MG/DL — LOW (ref 8.6–10.2)
CHLORIDE SERPL-SCNC: 98 MMOL/L — SIGNIFICANT CHANGE UP (ref 98–107)
CO2 SERPL-SCNC: 29 MMOL/L — SIGNIFICANT CHANGE UP (ref 22–29)
CREAT SERPL-MCNC: 1.46 MG/DL — HIGH (ref 0.5–1.3)
GLUCOSE BLDC GLUCOMTR-MCNC: 127 MG/DL — HIGH (ref 70–99)
GLUCOSE BLDC GLUCOMTR-MCNC: 91 MG/DL — SIGNIFICANT CHANGE UP (ref 70–99)
GLUCOSE BLDC GLUCOMTR-MCNC: 97 MG/DL — SIGNIFICANT CHANGE UP (ref 70–99)
GLUCOSE SERPL-MCNC: 110 MG/DL — HIGH (ref 70–99)
HCT VFR BLD CALC: 41.9 % — SIGNIFICANT CHANGE UP (ref 39–50)
HGB BLD-MCNC: 13.1 G/DL — SIGNIFICANT CHANGE UP (ref 13–17)
MAGNESIUM SERPL-MCNC: 2.3 MG/DL — SIGNIFICANT CHANGE UP (ref 1.6–2.6)
MCHC RBC-ENTMCNC: 27.6 PG — SIGNIFICANT CHANGE UP (ref 27–34)
MCHC RBC-ENTMCNC: 31.3 GM/DL — LOW (ref 32–36)
MCV RBC AUTO: 88.2 FL — SIGNIFICANT CHANGE UP (ref 80–100)
PLATELET # BLD AUTO: 203 K/UL — SIGNIFICANT CHANGE UP (ref 150–400)
POTASSIUM SERPL-MCNC: 4.2 MMOL/L — SIGNIFICANT CHANGE UP (ref 3.5–5.3)
POTASSIUM SERPL-SCNC: 4.2 MMOL/L — SIGNIFICANT CHANGE UP (ref 3.5–5.3)
RBC # BLD: 4.75 M/UL — SIGNIFICANT CHANGE UP (ref 4.2–5.8)
RBC # FLD: 16.3 % — HIGH (ref 10.3–14.5)
SODIUM SERPL-SCNC: 140 MMOL/L — SIGNIFICANT CHANGE UP (ref 135–145)
WBC # BLD: 5.69 K/UL — SIGNIFICANT CHANGE UP (ref 3.8–10.5)
WBC # FLD AUTO: 5.69 K/UL — SIGNIFICANT CHANGE UP (ref 3.8–10.5)

## 2022-01-06 PROCEDURE — 99233 SBSQ HOSP IP/OBS HIGH 50: CPT

## 2022-01-06 RX ORDER — SPIRONOLACTONE 25 MG/1
25 TABLET, FILM COATED ORAL DAILY
Refills: 0 | Status: DISCONTINUED | OUTPATIENT
Start: 2022-01-06 | End: 2022-01-01

## 2022-01-06 RX ADMIN — ATORVASTATIN CALCIUM 40 MILLIGRAM(S): 80 TABLET, FILM COATED ORAL at 21:00

## 2022-01-06 RX ADMIN — APIXABAN 5 MILLIGRAM(S): 2.5 TABLET, FILM COATED ORAL at 05:10

## 2022-01-06 RX ADMIN — BUMETANIDE 10 MG/HR: 0.25 INJECTION INTRAMUSCULAR; INTRAVENOUS at 05:09

## 2022-01-06 RX ADMIN — GABAPENTIN 100 MILLIGRAM(S): 400 CAPSULE ORAL at 05:09

## 2022-01-06 RX ADMIN — APIXABAN 5 MILLIGRAM(S): 2.5 TABLET, FILM COATED ORAL at 19:51

## 2022-01-06 RX ADMIN — Medication 100 MILLIGRAM(S): at 05:09

## 2022-01-06 RX ADMIN — Medication 81 MILLIGRAM(S): at 12:42

## 2022-01-06 RX ADMIN — GABAPENTIN 100 MILLIGRAM(S): 400 CAPSULE ORAL at 16:28

## 2022-01-06 RX ADMIN — GABAPENTIN 100 MILLIGRAM(S): 400 CAPSULE ORAL at 21:00

## 2022-01-06 RX ADMIN — MILRINONE LACTATE 5.63 MICROGRAM(S)/KG/MIN: 1 INJECTION, SOLUTION INTRAVENOUS at 05:08

## 2022-01-06 RX ADMIN — SPIRONOLACTONE 25 MILLIGRAM(S): 25 TABLET, FILM COATED ORAL at 19:52

## 2022-01-06 RX ADMIN — AMIODARONE HYDROCHLORIDE 200 MILLIGRAM(S): 400 TABLET ORAL at 05:09

## 2022-01-06 NOTE — PROGRESS NOTE ADULT - SUBJECTIVE AND OBJECTIVE BOX
INTERVAL HPI/OVERNIGHT EVENTS:    CC:  decompensated CHF, substance abuse, CAD, atrial flutter    No overnight events, feels the same.     Vital Signs Last 24 Hrs  T(C): 36.7 (06 Jan 2022 11:04), Max: 36.8 (05 Jan 2022 21:50)  T(F): 98.1 (06 Jan 2022 11:04), Max: 98.2 (05 Jan 2022 21:50)  HR: 68 (06 Jan 2022 11:04) (63 - 76)  BP: 112/74 (06 Jan 2022 11:04) (106/71 - 136/78)  BP(mean): --  RR: 20 (06 Jan 2022 11:04) (20 - 20)  SpO2: 93% (06 Jan 2022 11:04) (93% - 99%)    PHYSICAL EXAM:    GENERAL: alert, not in distress  CHEST/LUNG: b/l air entry, decreased in bases  HEART: reg  ABDOMEN: soft, bs+  EXTREMITIES:  2+ edema, non tender    MEDICATIONS  (STANDING):  aMIOdarone    Tablet 200 milliGRAM(s) Oral daily  apixaban 5 milliGRAM(s) Oral every 12 hours  aspirin enteric coated 81 milliGRAM(s) Oral daily  atorvastatin 40 milliGRAM(s) Oral at bedtime  buMETAnide Infusion 2 mG/Hr (10 mL/Hr) IV Continuous <Continuous>  dextrose 40% Gel 15 Gram(s) Oral once  dextrose 5%. 1000 milliLiter(s) (50 mL/Hr) IV Continuous <Continuous>  dextrose 5%. 1000 milliLiter(s) (100 mL/Hr) IV Continuous <Continuous>  dextrose 50% Injectable 25 Gram(s) IV Push once  dextrose 50% Injectable 12.5 Gram(s) IV Push once  dextrose 50% Injectable 25 Gram(s) IV Push once  gabapentin 100 milliGRAM(s) Oral three times a day  glucagon  Injectable 1 milliGRAM(s) IntraMuscular once  influenza   Vaccine 0.5 milliLiter(s) IntraMuscular once  insulin lispro (ADMELOG) corrective regimen sliding scale   SubCutaneous three times a day before meals  metoprolol succinate  milliGRAM(s) Oral daily  milrinone Infusion 0.25 MICROgram(s)/kG/Min (5.63 mL/Hr) IV Continuous <Continuous>  spironolactone 25 milliGRAM(s) Oral daily    MEDICATIONS  (PRN):  acetaminophen     Tablet .. 650 milliGRAM(s) Oral every 6 hours PRN Temp greater or equal to 38C (100.4F), Mild Pain (1 - 3)  aluminum hydroxide/magnesium hydroxide/simethicone Suspension 30 milliLiter(s) Oral every 4 hours PRN Dyspepsia  melatonin 3 milliGRAM(s) Oral at bedtime PRN Insomnia  ondansetron Injectable 4 milliGRAM(s) IV Push every 8 hours PRN Nausea and/or Vomiting  traMADol 25 milliGRAM(s) Oral every 6 hours PRN Severe Pain (7 - 10)      Allergies    No Known Allergies    Intolerances    pork (Other)        LABS:                          13.1   5.69  )-----------( 203      ( 06 Jan 2022 07:43 )             41.9     01-06    140  |  98  |  46.6<H>  ----------------------------<  110<H>  4.2   |  29.0  |  1.46<H>    Ca    6.9<L>      06 Jan 2022 07:43  Mg     2.3     01-06            RADIOLOGY & ADDITIONAL TESTS:

## 2022-01-06 NOTE — PROGRESS NOTE ADULT - SUBJECTIVE AND OBJECTIVE BOX
Subjective:  No overnight events  Tele shows NSVT 4 beats    Medications:  acetaminophen     Tablet .. 650 milliGRAM(s) Oral every 6 hours PRN  aluminum hydroxide/magnesium hydroxide/simethicone Suspension 30 milliLiter(s) Oral every 4 hours PRN  aMIOdarone    Tablet 200 milliGRAM(s) Oral daily  apixaban 5 milliGRAM(s) Oral every 12 hours  aspirin enteric coated 81 milliGRAM(s) Oral daily  atorvastatin 40 milliGRAM(s) Oral at bedtime  buMETAnide Infusion 2 mG/Hr IV Continuous <Continuous>  dextrose 40% Gel 15 Gram(s) Oral once  dextrose 5%. 1000 milliLiter(s) IV Continuous <Continuous>  dextrose 5%. 1000 milliLiter(s) IV Continuous <Continuous>  dextrose 50% Injectable 25 Gram(s) IV Push once  dextrose 50% Injectable 12.5 Gram(s) IV Push once  dextrose 50% Injectable 25 Gram(s) IV Push once  gabapentin 100 milliGRAM(s) Oral three times a day  glucagon  Injectable 1 milliGRAM(s) IntraMuscular once  influenza   Vaccine 0.5 milliLiter(s) IntraMuscular once  insulin lispro (ADMELOG) corrective regimen sliding scale   SubCutaneous three times a day before meals  melatonin 3 milliGRAM(s) Oral at bedtime PRN  metoprolol succinate  milliGRAM(s) Oral daily  milrinone Infusion 0.25 MICROgram(s)/kG/Min IV Continuous <Continuous>  ondansetron Injectable 4 milliGRAM(s) IV Push every 8 hours PRN  traMADol 25 milliGRAM(s) Oral every 6 hours PRN    Vitals:  T(C): 36.4 (01-06-22 @ 07:43), Max: 36.8 (01-05-22 @ 21:50)  HR: 63 (01-06-22 @ 07:43) (63 - 76)  BP: 106/71 (01-06-22 @ 07:43) (106/71 - 136/78)  BP(mean): --  RR: 20 (01-06-22 @ 07:43) (18 - 20)  SpO2: 99% (01-06-22 @ 07:43) (92% - 99%)    Daily     Daily         I&O's Summary      Physical Exam:  Appearance: No Acute Distress  HEENT: JVP 9cm  Cardiovascular: RRR, Normal S1 S2, 3/6 holosyst LLSB  Respiratory: Clear to auscultation bilaterally  Gastrointestinal: Soft, Non-tender, non-distended	  Skin: no skin lesions  Neurologic: Non-focal  Extremities: +1-2 LE edema  Psychiatry: A & O x 3, Mood & affect appropriate      Labs:                        13.1   5.69  )-----------( 203      ( 06 Jan 2022 07:43 )             41.9     01-06    140  |  98  |  46.6<H>  ----------------------------<  110<H>  4.2   |  29.0  |  1.46<H>    Ca    6.9<L>      06 Jan 2022 07:43  Mg     2.3     01-06

## 2022-01-06 NOTE — PROGRESS NOTE ADULT - ASSESSMENT
61 yr old male with CAD, dilated cardiomyopathy, s/p ICD, atrial flutter on Eliquis, substance abuse was admitted initially from 12/24 for decompensated CHF, was on Milrinone and Bumex drips, left AMA on 12/31 and returned to the ED later in the day as he had to take care of personal matter. He reported shortness of breath and right arm and foot pain on presentation. Cardiology was consulted, he was placed on oral Bumex., subsequently transitioned to Bumex gtt. Patient reported he was recently evicted from his apartment and is homeless, social work consult placed.      1. Decompensated CHF s/p ICD:  Continue Bumex and Milrinone at current rates  Add Aldactone.  daily weight and strict I/O  Continue statin, metoprolol  Supplemental oxygen  monitor lytes and renal function  continue telemetry  Not candidate for advanced therapies due to smoking/cocaine use history and non-compliance.    2. Atrial flutter:  Continue Amiodarone,  Metoprolol, Eliquis.    3. Diabetes mellitus:  Sliding scale coverage, monitor fingersticks.    4. DVT prophylaxis:  On Eliquis.    5. DERRICK on CKD:  Improving  Pre renal  Monitor renal function on Aldactone.    Discussed with patient and cardiology.

## 2022-01-06 NOTE — PROGRESS NOTE ADULT - PROBLEM SELECTOR PLAN 1
ACC/AHA stage C, NICMP (LVEF <20%, LVIDd 6.96cm)  Clinically hypervolemic, cool extremities.    Inotropic support: Milrinone gtt 0.250 mcg/kg/min  Monitor markers of perfusion daily including lactate, LFTs  Diuretics: Bumex gtt 2 mg/hr.   Please check BMP q12 hrs while on gtt. Maintain K+ >4.0 and Mg >2.0.  1.5L Fluid restriction, strict I&O and daily STANDING weights.  Please START aldactone 25mg daily today  Device: s/p ICD   Will reintroduce GDMT as tolerated after diuresis.   GDMT: bisoprolol 5mg daily (started on Toprol 100mg daily instead-should monitor BP). Plan to ACEi/ARB/ARNi if BP and renal function allows. SGLT2i as outpt.  Not candidate for advanced therapies due to smoking/cocaine use history and non-compliance.

## 2022-01-06 NOTE — PROGRESS NOTE ADULT - ASSESSMENT
62 y/o with h/o chronic systolic HF ACC/AHA stage C, NICMP LVEF <20% LVIDd 6.96cm, nonobstructive CAD, s/p ICD, HTN, active tobacco use, ?COPD who presented with decompensated HF. He reports being compliant with medications but not so much with low Na diet due to living in a group home.   Of note, the patient was recently admitted for acute decompensated HF in the setting of aflutter w/ RVR. He underwent successful VANESSA/DCCV on 11/22/21. This is his 4th hospitalization in the past year. He was started on bumex/milrinone gtt with good response.     Cards: Alison ([facility])    Pertinent Cardiac Studies  11/18/21 EKG Aflutter LAD. PRWP. NS T wave changes  11/20/21 LVEF <20% LVIDd 6.96cm VTI 5cm, RV severely enlarged with severely reduced systolic HF, sev biatrial enlargement, mod-sev MR, moderate TR, RVSP 38mmHg  11/22/21 VANESSA limited study, LVEF <20%, no FADUMO thrombus  2016 [facility] LM minor irreg, mid LAD 50%, LCx minor irreg, RCA prox 60% mid 85%

## 2022-01-07 LAB
ALBUMIN SERPL ELPH-MCNC: 3.7 G/DL — SIGNIFICANT CHANGE UP (ref 3.3–5.2)
ALP SERPL-CCNC: 294 U/L — HIGH (ref 40–120)
ALT FLD-CCNC: 39 U/L — SIGNIFICANT CHANGE UP
ANION GAP SERPL CALC-SCNC: 16 MMOL/L — SIGNIFICANT CHANGE UP (ref 5–17)
AST SERPL-CCNC: 56 U/L — HIGH
BILIRUB SERPL-MCNC: 1.2 MG/DL — SIGNIFICANT CHANGE UP (ref 0.4–2)
BUN SERPL-MCNC: 42.1 MG/DL — HIGH (ref 8–20)
CALCIUM SERPL-MCNC: 8.2 MG/DL — LOW (ref 8.6–10.2)
CHLORIDE SERPL-SCNC: 94 MMOL/L — LOW (ref 98–107)
CO2 SERPL-SCNC: 27 MMOL/L — SIGNIFICANT CHANGE UP (ref 22–29)
CREAT SERPL-MCNC: 1.19 MG/DL — SIGNIFICANT CHANGE UP (ref 0.5–1.3)
GLUCOSE BLDC GLUCOMTR-MCNC: 112 MG/DL — HIGH (ref 70–99)
GLUCOSE BLDC GLUCOMTR-MCNC: 119 MG/DL — HIGH (ref 70–99)
GLUCOSE BLDC GLUCOMTR-MCNC: 120 MG/DL — HIGH (ref 70–99)
GLUCOSE BLDC GLUCOMTR-MCNC: 83 MG/DL — SIGNIFICANT CHANGE UP (ref 70–99)
GLUCOSE SERPL-MCNC: 112 MG/DL — HIGH (ref 70–99)
MAGNESIUM SERPL-MCNC: 2.1 MG/DL — SIGNIFICANT CHANGE UP (ref 1.6–2.6)
POTASSIUM SERPL-MCNC: 3.6 MMOL/L — SIGNIFICANT CHANGE UP (ref 3.5–5.3)
POTASSIUM SERPL-SCNC: 3.6 MMOL/L — SIGNIFICANT CHANGE UP (ref 3.5–5.3)
PROT SERPL-MCNC: 6.8 G/DL — SIGNIFICANT CHANGE UP (ref 6.6–8.7)
SODIUM SERPL-SCNC: 137 MMOL/L — SIGNIFICANT CHANGE UP (ref 135–145)

## 2022-01-07 PROCEDURE — 99233 SBSQ HOSP IP/OBS HIGH 50: CPT

## 2022-01-07 RX ADMIN — AMIODARONE HYDROCHLORIDE 200 MILLIGRAM(S): 400 TABLET ORAL at 05:26

## 2022-01-07 RX ADMIN — GABAPENTIN 100 MILLIGRAM(S): 400 CAPSULE ORAL at 05:26

## 2022-01-07 RX ADMIN — GABAPENTIN 100 MILLIGRAM(S): 400 CAPSULE ORAL at 13:25

## 2022-01-07 RX ADMIN — Medication 650 MILLIGRAM(S): at 01:39

## 2022-01-07 RX ADMIN — APIXABAN 5 MILLIGRAM(S): 2.5 TABLET, FILM COATED ORAL at 17:49

## 2022-01-07 RX ADMIN — BUMETANIDE 10 MG/HR: 0.25 INJECTION INTRAMUSCULAR; INTRAVENOUS at 13:50

## 2022-01-07 RX ADMIN — Medication 81 MILLIGRAM(S): at 13:25

## 2022-01-07 RX ADMIN — APIXABAN 5 MILLIGRAM(S): 2.5 TABLET, FILM COATED ORAL at 05:27

## 2022-01-07 RX ADMIN — SPIRONOLACTONE 25 MILLIGRAM(S): 25 TABLET, FILM COATED ORAL at 05:26

## 2022-01-07 RX ADMIN — ATORVASTATIN CALCIUM 40 MILLIGRAM(S): 80 TABLET, FILM COATED ORAL at 21:13

## 2022-01-07 RX ADMIN — Medication 650 MILLIGRAM(S): at 02:10

## 2022-01-07 RX ADMIN — GABAPENTIN 100 MILLIGRAM(S): 400 CAPSULE ORAL at 21:13

## 2022-01-07 NOTE — DIETITIAN NUTRITION RISK NOTIFICATION - TREATMENT: THE FOLLOWING DIET HAS BEEN RECOMMENDED
Diet, DASH/TLC:   Sodium & Cholesterol Restricted  1500mL Fluid Restriction ([id number]) (01-03-22 @ 14:13) [Active]

## 2022-01-07 NOTE — DIETITIAN INITIAL EVALUATION ADULT. - ORAL INTAKE PTA/DIET HISTORY
Pt reports good po intake at this time.  Pt with inconsistent po intake PTA due to housing issues.  Pt states possible wt loss PTA, but unsure how much.  Pt states he is unable to follow low na meal plan consistently due to housing inconsistencies and limited access to healthful foods.  Pt demonstrates good understanding of heart failure nutrition guidelines and would like to follow when able to.  Reinforced meal plan and provided nutrition literature.  Notified social work per pt request.

## 2022-01-07 NOTE — PROGRESS NOTE ADULT - ASSESSMENT
61 yr old male with CAD, dilated cardiomyopathy, s/p ICD, atrial flutter on Eliquis, substance abuse was admitted initially from 12/24 for decompensated CHF, was on Milrinone and Bumex drips, left AMA on 12/31 and returned to the ED later in the day as he had to take care of personal matter. He reported shortness of breath and right arm and foot pain on presentation. Cardiology was consulted, he was placed on oral Bumex., subsequently transitioned to Bumex gtt. Patient reported he was recently evicted from his apartment and is homeless, social work consult placed.      1. Decompensated CHF s/p ICD:  Continue Bumex and Milrinone at current rates  Continue Aldactone.  daily weight and strict I/O  Continue statin, metoprolol  Supplemental oxygen  monitor lytes and renal function  continue telemetry  Not candidate for advanced therapies due to smoking/cocaine use history and non-compliance.    2. Atrial flutter:  Continue Amiodarone,  Metoprolol, Eliquis.    3. Diabetes mellitus:  Sliding scale coverage, monitor fingersticks.    4. DVT prophylaxis:  On Eliquis.    5. DERRICK on CKD:  Improving  Pre renal  Monitor renal function on Aldactone and Lasix.

## 2022-01-07 NOTE — PROGRESS NOTE ADULT - PROBLEM SELECTOR PLAN 1
ACC/AHA stage C, NICMP (LVEF <20%, LVIDd 6.96cm)  Clinically hypervolemic, cool extremities.    Inotropic support: Continue Milrinone gtt 0.250 mcg/kg/min  Monitor markers of perfusion daily including lactate, LFTs  Diuretics: Bumex gtt 2 mg/hr.   Please check BMP q12 hrs while on gtt. Maintain K+ >4.0 and Mg >2.0.  1.5L Fluid restriction, strict I&O and daily STANDING weights.  GDMT: Continue Toprol-XL 100mg daily and aldactone 25mg daily. Plan to optimize w/ ACEi/ARB/ARNi once labwork is obtained and reviewed. SGLT2i as outpt.  Device: s/p ICD   Not candidate for advanced therapies due to smoking/cocaine use history and non-compliance.

## 2022-01-07 NOTE — DIETITIAN INITIAL EVALUATION ADULT. - OTHER INFO
Pt with h/o CAD, dilated cardiomyopathy, s/p ICD, atrial flutter on Eliquis, substance abuse was admitted initially from 12/24 for decompensated CHF, was on Milrinone and Bumex drips, left AMA on 12/31 and returned to the ED later in the day as he had to take care of personal matter. He reported shortness of breath and right arm and foot pain on presentation.  Patient reported he was recently evicted from his apartment and is homeless, social work consult placed.

## 2022-01-07 NOTE — PROGRESS NOTE ADULT - ATTENDING COMMENTS
Feels better.   Bumex and milrinone gtt increased yesterday. Should continue same regimen until euvolemic. May need sequential nephron blockade based on response.   Plan as above.

## 2022-01-07 NOTE — PROGRESS NOTE ADULT - SUBJECTIVE AND OBJECTIVE BOX
[facility] Advanced Heart Failure  Office: [facility] [place] [address]  Telephone: [phone]/Fax: [phone]    Subjective: NAEO. Pt. reported some difficulty sleeping last night related to breathing. Currently denies SOB, CP, palpitations, dizziness/LH. Endorses good UOP.     Medications:  acetaminophen     Tablet .. 650 milliGRAM(s) Oral every 6 hours PRN  aluminum hydroxide/magnesium hydroxide/simethicone Suspension 30 milliLiter(s) Oral every 4 hours PRN  aMIOdarone    Tablet 200 milliGRAM(s) Oral daily  apixaban 5 milliGRAM(s) Oral every 12 hours  aspirin enteric coated 81 milliGRAM(s) Oral daily  atorvastatin 40 milliGRAM(s) Oral at bedtime  buMETAnide Infusion 2 mG/Hr IV Continuous <Continuous>  dextrose 40% Gel 15 Gram(s) Oral once  dextrose 5%. 1000 milliLiter(s) IV Continuous <Continuous>  dextrose 5%. 1000 milliLiter(s) IV Continuous <Continuous>  dextrose 50% Injectable 25 Gram(s) IV Push once  dextrose 50% Injectable 12.5 Gram(s) IV Push once  dextrose 50% Injectable 25 Gram(s) IV Push once  gabapentin 100 milliGRAM(s) Oral three times a day  glucagon  Injectable 1 milliGRAM(s) IntraMuscular once  influenza   Vaccine 0.5 milliLiter(s) IntraMuscular once  insulin lispro (ADMELOG) corrective regimen sliding scale   SubCutaneous three times a day before meals  melatonin 3 milliGRAM(s) Oral at bedtime PRN  metoprolol succinate  milliGRAM(s) Oral daily  milrinone Infusion 0.25 MICROgram(s)/kG/Min IV Continuous <Continuous>  ondansetron Injectable 4 milliGRAM(s) IV Push every 8 hours PRN  spironolactone 25 milliGRAM(s) Oral daily  traMADol 25 milliGRAM(s) Oral every 6 hours PRN    Vital Signs Last 24 Hours  T(C): 36.3 (22 @ 08:05), Max: 37.1 (22 @ 15:14)  HR: 69 (22 @ 08:05) (68 - 73)  BP: 122/80 (22 @ 08:05) (106/70 - 134/94)  RR: 18 (22 @ 08:05) (18 - 20)  SpO2: 98% (22 @ 08:05) (91% - 98%)    Weight in k.7 ( @ 06:49)    I&O's Summary    2022 07:01  -  2022 07:00  --------------------------------------------------------  IN: 380.7 mL / OUT: 1240 mL / NET: -859.3 mL    Tele: Sinus rhythm    Physical Exam:  General: No distress. Comfortable.  HEENT: EOMs intact.  Neck: JVP ~8cm H20  Chest: Clear to auscultation bilaterally  CV: RRR. Normal S1 and S2. II/VI CLARA LLSB  PV: 2+ B/L LE edema slightly improved  Abdomen: Soft, mildly distended  Skin: warm, dry  Neurology: Alert and oriented times three. Sensation intact  Psych: Affect normal    Labs:                        13.1   5.69  )-----------( 203      ( 2022 07:43 )             41.9         140  |  98  |  46.6<H>  ----------------------------<  110<H>  4.2   |  29.0  |  1.46<H>    Ca    6.9<L>      2022 07:43  Mg     2.3           Serum Pro-Brain Natriuretic Peptide: 37421 pg/mL (12- @ 05:48)    TTE Echo Complete w/o Contrast w/ Doppler (21 @ 08:33)   Summary:   1. Left ventricular ejection fraction, by visual estimation, is <20%.   2. Technically good study.   3. Severely enlarged left atrium.   4. Severely enlarged right atrium.   5. Severely enlarged right ventricle.   6. Severely reduced RV systolic function.   7. Moderate to severe mitral valve regurgitation.   8. Thickening of the anterior mitral valve leaflet.   9. Moderate tricuspid regurgitation.  10. Estimated pulmonary artery systolic pressure is 37.8 mmHg assuming a right atrial pressure of 8 mmHg, which is consistent with borderline pulmonary hypertension.    < end of copied text >

## 2022-01-07 NOTE — PROGRESS NOTE ADULT - ASSESSMENT
60 y/o with h/o chronic systolic HF ACC/AHA stage C, NICMP LVEF <20% LVIDd 6.96cm, nonobstructive CAD, s/p ICD, HTN, active tobacco use, ?COPD who presented with decompensated HF. He reports being compliant with medications but not so much with low Na diet due to living in a group home.   Of note, the patient was recently admitted for acute decompensated HF in the setting of aflutter w/ RVR. He underwent successful VANESSA/DCCV on 11/22/21. This is his 4th hospitalization in the past year. He was started on bumex/milrinone gtt with good response.     Cards: Alison ([facility])    Pertinent Cardiac Studies  11/18/21 EKG Aflutter LAD. PRWP. NS T wave changes    11/20/21 LVEF <20% LVIDd 6.96cm VTI 5cm, RV severely enlarged with severely reduced systolic HF, sev biatrial enlargement, mod-sev MR, moderate TR, RVSP 38mmHg    11/22/21 VANESSA limited study, LVEF <20%, no FADUMO thrombus    2016 [facility] LM minor irreg, mid LAD 50%, LCx minor irreg, RCA prox 60% mid 85%

## 2022-01-07 NOTE — DIETITIAN INITIAL EVALUATION ADULT. - ETIOLOGY
related to inability to consume sufficient protein energy intake in setting of limited access to healthful food at times due to housing issues

## 2022-01-07 NOTE — DIETITIAN INITIAL EVALUATION ADULT. - PERTINENT LABORATORY DATA
01-06 Na140 mmol/L Glu 110 mg/dL<H> K+ 4.2 mmol/L Cr  1.46 mg/dL<H> BUN 46.6 mg/dL<H> Phos n/a   Alb n/a   PAB n/a

## 2022-01-07 NOTE — PROGRESS NOTE ADULT - SUBJECTIVE AND OBJECTIVE BOX
CC: CHF (07 Jan 2022 11:57)    HPI:  61 year old male with history of CAD, Severely Dilated CM/HFrEF s/p ICD, Substance abuse, Atrial Flutter on Eliquis, ETOH use who presented to the ED with complaints of right arm and foot pain with increased shortness of breath due to medication non-compliance and admitted with decompensated heart failure on 12/24 then pt left against medical advice on 12/31 for personal matter. Pt returned the next evening to be readmitted. Pt states he was getting evicted and needs to get all his belongings including a photo frame of him and his mother that means a lot to him. He states that he dropped his stuff off at his friend house then took taxi back to the hospital.     INTERVAL HPI/OVERNIGHT EVENTS: Patient seen and examined sitting up on side of bed.  Patient complaining of epistaxis, intermittently refusing O2.  Patient denies any headache, dizziness, SOB, CP, abdominal pain, nausea, vomiting, dysuria.  Other ROS reviewed and are negative.  Tele with NSVT.    Vital Signs Last 24 Hrs  T(C): 36.3 (07 Jan 2022 08:05), Max: 36.9 (06 Jan 2022 21:53)  T(F): 97.4 (07 Jan 2022 08:05), Max: 98.5 (06 Jan 2022 21:53)  HR: 69 (07 Jan 2022 08:05) (68 - 73)  BP: 122/80 (07 Jan 2022 08:05) (106/70 - 126/77)  BP(mean): --  RR: 18 (07 Jan 2022 08:05) (18 - 19)  SpO2: 98% (07 Jan 2022 08:05) (96% - 98%)  I&O's Detail    06 Jan 2022 07:01  -  07 Jan 2022 07:00  --------------------------------------------------------  IN:    Bumetanide: 90 mL    Milrinone: 50.7 mL    Oral Fluid: 240 mL  Total IN: 380.7 mL    OUT:    Voided (mL): 1240 mL  Total OUT: 1240 mL    Total NET: -859.3 mL      07 Jan 2022 07:01  -  07 Jan 2022 15:36  --------------------------------------------------------  IN:    Oral Fluid: 340 mL  Total IN: 340 mL    OUT:    Voided (mL): 1250 mL  Total OUT: 1250 mL    Total NET: -910 mL      PHYSICAL EXAM:  GENERAL: NAD  HEAD:  Atraumatic, Normocephalic  NECK: Supple, No JVD, Normal thyroid  NERVOUS SYSTEM:  Alert & Oriented X3, Good concentration; Motor Strength 5/5 B/L upper and lower extremities  CHEST/LUNG: Bilateral air entry  HEART: Regular rate and rhythm; No murmurs, rubs, or gallops  ABDOMEN: Soft, Nontender, Nondistended; Bowel sounds present  EXTREMITIES:  2+ Peripheral Pulses, LE edema                            13.1   5.69  )-----------( 203      ( 06 Jan 2022 07:43 )             41.9     06 Jan 2022 07:43    140    |  98     |  46.6   ----------------------------<  110    4.2     |  29.0   |  1.46     Ca    6.9        06 Jan 2022 07:43  Mg     2.3       06 Jan 2022 07:43        CAPILLARY BLOOD GLUCOSE  POCT Blood Glucose.: 112 mg/dL (07 Jan 2022 12:05)  POCT Blood Glucose.: 83 mg/dL (07 Jan 2022 07:56)  POCT Blood Glucose.: 127 mg/dL (06 Jan 2022 17:34)          MEDICATIONS  (STANDING):  aMIOdarone    Tablet 200 milliGRAM(s) Oral daily  apixaban 5 milliGRAM(s) Oral every 12 hours  aspirin enteric coated 81 milliGRAM(s) Oral daily  atorvastatin 40 milliGRAM(s) Oral at bedtime  buMETAnide Infusion 2 mG/Hr (10 mL/Hr) IV Continuous <Continuous>  dextrose 40% Gel 15 Gram(s) Oral once  dextrose 5%. 1000 milliLiter(s) (50 mL/Hr) IV Continuous <Continuous>  dextrose 5%. 1000 milliLiter(s) (100 mL/Hr) IV Continuous <Continuous>  dextrose 50% Injectable 25 Gram(s) IV Push once  dextrose 50% Injectable 12.5 Gram(s) IV Push once  dextrose 50% Injectable 25 Gram(s) IV Push once  gabapentin 100 milliGRAM(s) Oral three times a day  glucagon  Injectable 1 milliGRAM(s) IntraMuscular once  influenza   Vaccine 0.5 milliLiter(s) IntraMuscular once  insulin lispro (ADMELOG) corrective regimen sliding scale   SubCutaneous three times a day before meals  metoprolol succinate  milliGRAM(s) Oral daily  milrinone Infusion 0.25 MICROgram(s)/kG/Min (5.63 mL/Hr) IV Continuous <Continuous>  spironolactone 25 milliGRAM(s) Oral daily    MEDICATIONS  (PRN):  acetaminophen     Tablet .. 650 milliGRAM(s) Oral every 6 hours PRN Temp greater or equal to 38C (100.4F), Mild Pain (1 - 3)  aluminum hydroxide/magnesium hydroxide/simethicone Suspension 30 milliLiter(s) Oral every 4 hours PRN Dyspepsia  melatonin 3 milliGRAM(s) Oral at bedtime PRN Insomnia  ondansetron Injectable 4 milliGRAM(s) IV Push every 8 hours PRN Nausea and/or Vomiting  traMADol 25 milliGRAM(s) Oral every 6 hours PRN Severe Pain (7 - 10)

## 2022-01-08 LAB
ALBUMIN SERPL ELPH-MCNC: 3.6 G/DL — SIGNIFICANT CHANGE UP (ref 3.3–5.2)
ALP SERPL-CCNC: 310 U/L — HIGH (ref 40–120)
ALT FLD-CCNC: 40 U/L — SIGNIFICANT CHANGE UP
ANION GAP SERPL CALC-SCNC: 14 MMOL/L — SIGNIFICANT CHANGE UP (ref 5–17)
AST SERPL-CCNC: 58 U/L — HIGH
BASOPHILS # BLD AUTO: 0.04 K/UL — SIGNIFICANT CHANGE UP (ref 0–0.2)
BASOPHILS NFR BLD AUTO: 0.7 % — SIGNIFICANT CHANGE UP (ref 0–2)
BILIRUB DIRECT SERPL-MCNC: 0.5 MG/DL — HIGH (ref 0–0.3)
BILIRUB INDIRECT FLD-MCNC: 0.6 MG/DL — SIGNIFICANT CHANGE UP (ref 0.2–1)
BILIRUB SERPL-MCNC: 1.1 MG/DL — SIGNIFICANT CHANGE UP (ref 0.4–2)
BUN SERPL-MCNC: 41.1 MG/DL — HIGH (ref 8–20)
CALCIUM SERPL-MCNC: 8.2 MG/DL — LOW (ref 8.6–10.2)
CHLORIDE SERPL-SCNC: 95 MMOL/L — LOW (ref 98–107)
CO2 SERPL-SCNC: 25 MMOL/L — SIGNIFICANT CHANGE UP (ref 22–29)
CREAT SERPL-MCNC: 1.11 MG/DL — SIGNIFICANT CHANGE UP (ref 0.5–1.3)
EOSINOPHIL # BLD AUTO: 0.08 K/UL — SIGNIFICANT CHANGE UP (ref 0–0.5)
EOSINOPHIL NFR BLD AUTO: 1.4 % — SIGNIFICANT CHANGE UP (ref 0–6)
GLUCOSE BLDC GLUCOMTR-MCNC: 101 MG/DL — HIGH (ref 70–99)
GLUCOSE BLDC GLUCOMTR-MCNC: 102 MG/DL — HIGH (ref 70–99)
GLUCOSE BLDC GLUCOMTR-MCNC: 114 MG/DL — HIGH (ref 70–99)
GLUCOSE BLDC GLUCOMTR-MCNC: 131 MG/DL — HIGH (ref 70–99)
GLUCOSE SERPL-MCNC: 109 MG/DL — HIGH (ref 70–99)
HCT VFR BLD CALC: 40.8 % — SIGNIFICANT CHANGE UP (ref 39–50)
HGB BLD-MCNC: 13.1 G/DL — SIGNIFICANT CHANGE UP (ref 13–17)
IMM GRANULOCYTES NFR BLD AUTO: 0.4 % — SIGNIFICANT CHANGE UP (ref 0–1.5)
LYMPHOCYTES # BLD AUTO: 0.63 K/UL — LOW (ref 1–3.3)
LYMPHOCYTES # BLD AUTO: 11.1 % — LOW (ref 13–44)
MAGNESIUM SERPL-MCNC: 2.1 MG/DL — SIGNIFICANT CHANGE UP (ref 1.6–2.6)
MCHC RBC-ENTMCNC: 27.5 PG — SIGNIFICANT CHANGE UP (ref 27–34)
MCHC RBC-ENTMCNC: 32.1 GM/DL — SIGNIFICANT CHANGE UP (ref 32–36)
MCV RBC AUTO: 85.5 FL — SIGNIFICANT CHANGE UP (ref 80–100)
MONOCYTES # BLD AUTO: 0.74 K/UL — SIGNIFICANT CHANGE UP (ref 0–0.9)
MONOCYTES NFR BLD AUTO: 13.1 % — SIGNIFICANT CHANGE UP (ref 2–14)
NEUTROPHILS # BLD AUTO: 4.16 K/UL — SIGNIFICANT CHANGE UP (ref 1.8–7.4)
NEUTROPHILS NFR BLD AUTO: 73.3 % — SIGNIFICANT CHANGE UP (ref 43–77)
PLATELET # BLD AUTO: 205 K/UL — SIGNIFICANT CHANGE UP (ref 150–400)
POTASSIUM SERPL-MCNC: 4 MMOL/L — SIGNIFICANT CHANGE UP (ref 3.5–5.3)
POTASSIUM SERPL-SCNC: 4 MMOL/L — SIGNIFICANT CHANGE UP (ref 3.5–5.3)
PROT SERPL-MCNC: 6.7 G/DL — SIGNIFICANT CHANGE UP (ref 6.6–8.7)
RBC # BLD: 4.77 M/UL — SIGNIFICANT CHANGE UP (ref 4.2–5.8)
RBC # FLD: 16.3 % — HIGH (ref 10.3–14.5)
SARS-COV-2 RNA SPEC QL NAA+PROBE: SIGNIFICANT CHANGE UP
SODIUM SERPL-SCNC: 134 MMOL/L — LOW (ref 135–145)
WBC # BLD: 5.67 K/UL — SIGNIFICANT CHANGE UP (ref 3.8–10.5)
WBC # FLD AUTO: 5.67 K/UL — SIGNIFICANT CHANGE UP (ref 3.8–10.5)

## 2022-01-08 PROCEDURE — 99233 SBSQ HOSP IP/OBS HIGH 50: CPT

## 2022-01-08 RX ADMIN — Medication 81 MILLIGRAM(S): at 12:57

## 2022-01-08 RX ADMIN — ATORVASTATIN CALCIUM 40 MILLIGRAM(S): 80 TABLET, FILM COATED ORAL at 21:33

## 2022-01-08 RX ADMIN — AMIODARONE HYDROCHLORIDE 200 MILLIGRAM(S): 400 TABLET ORAL at 05:56

## 2022-01-08 RX ADMIN — APIXABAN 5 MILLIGRAM(S): 2.5 TABLET, FILM COATED ORAL at 05:56

## 2022-01-08 RX ADMIN — SPIRONOLACTONE 25 MILLIGRAM(S): 25 TABLET, FILM COATED ORAL at 05:56

## 2022-01-08 RX ADMIN — GABAPENTIN 100 MILLIGRAM(S): 400 CAPSULE ORAL at 05:56

## 2022-01-08 RX ADMIN — GABAPENTIN 100 MILLIGRAM(S): 400 CAPSULE ORAL at 21:33

## 2022-01-08 RX ADMIN — APIXABAN 5 MILLIGRAM(S): 2.5 TABLET, FILM COATED ORAL at 17:24

## 2022-01-08 RX ADMIN — GABAPENTIN 100 MILLIGRAM(S): 400 CAPSULE ORAL at 12:56

## 2022-01-08 NOTE — PROGRESS NOTE ADULT - SUBJECTIVE AND OBJECTIVE BOX
[facility] Division of Hospital Medicine    Chief Complaint:  CHF    SUBJECTIVE / OVERNIGHT EVENTS: Patient seen and examined. Breathing is getting better. He is making urine.     Patient denies chest pain, SOB, abd pain, N/V, fever, chills, dysuria or any other complaints. All remainder ROS negative.     MEDICATIONS  (STANDING):  aMIOdarone    Tablet 200 milliGRAM(s) Oral daily  apixaban 5 milliGRAM(s) Oral every 12 hours  aspirin enteric coated 81 milliGRAM(s) Oral daily  atorvastatin 40 milliGRAM(s) Oral at bedtime  buMETAnide Infusion 2 mG/Hr (10 mL/Hr) IV Continuous <Continuous>  dextrose 40% Gel 15 Gram(s) Oral once  dextrose 5%. 1000 milliLiter(s) (50 mL/Hr) IV Continuous <Continuous>  dextrose 5%. 1000 milliLiter(s) (100 mL/Hr) IV Continuous <Continuous>  dextrose 50% Injectable 25 Gram(s) IV Push once  dextrose 50% Injectable 12.5 Gram(s) IV Push once  dextrose 50% Injectable 25 Gram(s) IV Push once  gabapentin 100 milliGRAM(s) Oral three times a day  glucagon  Injectable 1 milliGRAM(s) IntraMuscular once  influenza   Vaccine 0.5 milliLiter(s) IntraMuscular once  insulin lispro (ADMELOG) corrective regimen sliding scale   SubCutaneous three times a day before meals  metoprolol succinate  milliGRAM(s) Oral daily  milrinone Infusion 0.25 MICROgram(s)/kG/Min (5.63 mL/Hr) IV Continuous <Continuous>  spironolactone 25 milliGRAM(s) Oral daily    MEDICATIONS  (PRN):  acetaminophen     Tablet .. 650 milliGRAM(s) Oral every 6 hours PRN Temp greater or equal to 38C (100.4F), Mild Pain (1 - 3)  aluminum hydroxide/magnesium hydroxide/simethicone Suspension 30 milliLiter(s) Oral every 4 hours PRN Dyspepsia  melatonin 3 milliGRAM(s) Oral at bedtime PRN Insomnia  ondansetron Injectable 4 milliGRAM(s) IV Push every 8 hours PRN Nausea and/or Vomiting  traMADol 25 milliGRAM(s) Oral every 6 hours PRN Severe Pain (7 - 10)        I&O's Summary    07 Jan 2022 07:01  -  08 Jan 2022 07:00  --------------------------------------------------------  IN: 1435.1 mL / OUT: 3820 mL / NET: -2384.9 mL    08 Jan 2022 07:01  -  08 Jan 2022 14:25  --------------------------------------------------------  IN: 240 mL / OUT: 1400 mL / NET: -1160 mL        PHYSICAL EXAM:  Vital Signs Last 24 Hrs  T(C): 36.7 (08 Jan 2022 05:50), Max: 36.9 (07 Jan 2022 21:10)  T(F): 98.1 (08 Jan 2022 05:50), Max: 98.4 (07 Jan 2022 21:10)  HR: 88 (08 Jan 2022 05:50) (80 - 88)  BP: 105/65 (08 Jan 2022 05:50) (103/65 - 113/72)  BP(mean): --  RR: 18 (08 Jan 2022 05:50) (18 - 19)  SpO2: 97% (08 Jan 2022 05:50) (97% - 98%)        CONSTITUTIONAL: NAD  ENMT: Moist oral mucosa, no pharyngeal injection or exudates  RESPIRATORY: Normal respiratory effort; lungs are clear to auscultation bilaterally  CARDIOVASCULAR: Regular rate and rhythm, normal S1 and S2, + lower extremity edema;  ABDOMEN: Nontender to palpation, normoactive bowel sounds, no rebound/guarding;   MUSCLOSKELETAL:  no clubbing or cyanosis of digits; no joint swelling or tenderness to palpation  PSYCH: A+O to person, place, and time; affect appropriate  NEUROLOGY: CN 2-12 are intact and symmetric; no gross sensory deficits;   SKIN: No rashes; no palpable lesions    LABS:                        13.1   5.67  )-----------( 205      ( 08 Jan 2022 05:58 )             40.8     01-08    134<L>  |  95<L>  |  41.1<H>  ----------------------------<  109<H>  4.0   |  25.0  |  1.11    Ca    8.2<L>      08 Jan 2022 05:58  Mg     2.1     01-08    TPro  6.7  /  Alb  3.6  /  TBili  1.1  /  DBili  0.5<H>  /  AST  58<H>  /  ALT  40  /  AlkPhos  310<H>  01-08              CAPILLARY BLOOD GLUCOSE      POCT Blood Glucose.: 101 mg/dL (08 Jan 2022 12:17)  POCT Blood Glucose.: 131 mg/dL (08 Jan 2022 07:53)  POCT Blood Glucose.: 120 mg/dL (07 Jan 2022 21:12)  POCT Blood Glucose.: 119 mg/dL (07 Jan 2022 18:15)        RADIOLOGY & ADDITIONAL TESTS:  Results Reviewed:   Imaging Personally Reviewed:  Electrocardiogram Personally Reviewed:

## 2022-01-08 NOTE — PROGRESS NOTE ADULT - ATTENDING COMMENTS
-diuresing well on milrinone assisted Bumex gtt, remains grossly fluid overloaded, continue current regimen likely another 10 liters to go; Cr. improving with diuresis  -poor social living situation and no outpatient follow up, at risk for recurrent ADHF, advised compliance with meds/outpatient follow up  -pAflutter s/p prior VANESSA/CV- remains in sinus on Amio, metoprolol and Eliquis        Micah Mullins DO, [facility]  Faculty Non-Invasive Cardiologist  [phone]

## 2022-01-08 NOTE — PROGRESS NOTE ADULT - SUBJECTIVE AND OBJECTIVE BOX
[facility] NXROIPACNY-OSOG-B            [facility]                          [address] [address]                       Phone: [phone]. Fax:[phone]                      ________________________________________________    HPI:  61 year old male with history of CAD, Severely Dilated CM/HFrEF s/p ICD, Substance abuse. Atrial Flutter on Eliquis, ETOH use who presented to the ED with complaints of right arm and foot pain with increased shortness of breath due to medication non-compliance and admitted with decompensated heart failure on  then pt left against medical advice on  for personal matter. Pt returned this evening to be readmitted. Pt states he was getting evicted and needs to get all his belongings including a photo frame of him and his mother that means a lot to him. He states that he dropped his stuff off at his friend house then took taxi back to the hospital. currently stable without cp, sob, denies substance abuse or drinking alcoholic beverages.    (2022 03:37)    HOME MEDICATIONS:      ROS: All review of systems negative unless indicated otherwise below.                         PHYSICAL EXAM:    GENERAL: NAD  NECK: Supple, significant JVD but improving   NERVOUS SYSTEM:  Alert & Oriented X3, non focal neuro exam.   CHEST/LUNG: diminished lungs, bibasilar crackles   HEART: Regular rate and rhythm; s1 and s2 auscultated, No murmurs, rubs, or gallops  ABDOMEN: Soft, Nontender, Nondistended; Bowel sounds present and normoactive.   EXTREMITIES:  2+ Peripheral Pulses, No clubbing, cyanosis. LE edema +2 wrapped in ACE       Vital Signs Last 24 Hrs  T(C): 36.7 (2022 05:50), Max: 36.9 (2022 21:10)  T(F): 98.1 (2022 05:50), Max: 98.4 (2022 21:10)  HR: 88 (2022 05:50) (80 - 88)  BP: 105/65 (2022 05:50) (103/65 - 113/72)  BP(mean): --  RR: 18 (2022 05:50) (18 - 19)  SpO2: 97% (2022 05:50) (97% - 98%)                                                   DAILY WEIGHTS - 48 HOUR TREND   Daily Weight in k.7 (2022 06:49)                             INTAKE AND OUTPUT - 48 HOUR TREND   22 @ 07:01  -  22 @ 07:00  --------------------------------------------------------  IN:  Total IN: 0 mL    OUT:    Voided (mL): 1240 mL  Total OUT: 1240 mL    Total NET: -1240 mL      22 @ 07:01  -  22 @ 07:00  --------------------------------------------------------  IN:  Total IN: 0 mL    OUT:    Voided (mL): 3820 mL  Total OUT: 3820 mL    Total NET: -3820 mL                                                           LAB RESULTS                 COMPLETE BLOOD COUNT( 2022 05:58 )                            13.1 g/dL  5.67 K/uL )---------------( 205 K/uL                        40.8 %      Automated Differential     Auto Basophil # - 0.04 K/uL  Auto Basophil % - 0.7 %  Auto Eosinophil # - 0.08 K/uL  Auto Eosinophil % - 1.4 %  Auto Immature Granulocyte # - X      Auto Immature Granulocyte % - 0.4 %  Auto Lymphocyte # - 0.63 K/uL<L>  Auto Lymphocyte % - 11.1 %<L>  Auto Monocyte # - 0.74 K/uL  Auto Monocyte % - 13.1 %  Auto Neutrophil # - 4.16 K/uL  Auto Neutrophil % - 73.3 %                                  CHEMISTRY                 Basic Metabolic Panel (22 @ 05:58)    134<L>  |  95<L>  |  41.1<H>  ----------------------------<  109<H>  4.0   |  25.0  |  1.11    Ca    8.2<L>      2022 05:58  Phos  3.2       Mg     2.1                         Liver Functions (22 @ 05:58))  TPro  6.7  /  Alb  3.6  /  TBili  1.1  /  DBili  0.5  /  AST  58  /  ALT  40  /  AlkPhos  310     PT/INR/PTT ( 28 Dec 2021 05:48 )                        :                       :      16.7         :       X                     .        .                   .              .           .       1.47        .                                                                 Cardiac Enzymes   ( 28 Dec 2021 05:48 )  Troponin T  X    ,  CPK  X    , CKMB  X    , BNP 70973<H>                                       Current Admission Active Medications    acetaminophen     Tablet .. 650 milliGRAM(s) Oral every 6 hours PRN Temp greater or equal to 38C (100.4F), Mild Pain (1 - 3)  aluminum hydroxide/magnesium hydroxide/simethicone Suspension 30 milliLiter(s) Oral every 4 hours PRN Dyspepsia  aMIOdarone    Tablet 200 milliGRAM(s) Oral daily  apixaban 5 milliGRAM(s) Oral every 12 hours  aspirin enteric coated 81 milliGRAM(s) Oral daily  atorvastatin 40 milliGRAM(s) Oral at bedtime  buMETAnide Infusion 2 mG/Hr (10 mL/Hr) IV Continuous <Continuous>  dextrose 40% Gel 15 Gram(s) Oral once  dextrose 5%. 1000 milliLiter(s) (50 mL/Hr) IV Continuous <Continuous>  dextrose 5%. 1000 milliLiter(s) (100 mL/Hr) IV Continuous <Continuous>  dextrose 50% Injectable 25 Gram(s) IV Push once  dextrose 50% Injectable 12.5 Gram(s) IV Push once  dextrose 50% Injectable 25 Gram(s) IV Push once  gabapentin 100 milliGRAM(s) Oral three times a day  glucagon  Injectable 1 milliGRAM(s) IntraMuscular once  influenza   Vaccine 0.5 milliLiter(s) IntraMuscular once  insulin lispro (ADMELOG) corrective regimen sliding scale   SubCutaneous three times a day before meals  melatonin 3 milliGRAM(s) Oral at bedtime PRN Insomnia  metoprolol succinate  milliGRAM(s) Oral daily  milrinone Infusion 0.25 MICROgram(s)/kG/Min (5.63 mL/Hr) IV Continuous <Continuous>  ondansetron Injectable 4 milliGRAM(s) IV Push every 8 hours PRN Nausea and/or Vomiting  spironolactone 25 milliGRAM(s) Oral daily  traMADol 25 milliGRAM(s) Oral every 6 hours PRN Severe Pain (7 - 10)

## 2022-01-08 NOTE — PROGRESS NOTE ADULT - ASSESSMENT
62 y/o with h/o chronic systolic HF ACC/AHA stage C, NICMP LVEF <20% LVIDd 6.96cm, nonobstructive CAD, s/p ICD, HTN, active tobacco use, ?COPD who presented with decompensated HF. He reports being compliant with medications but not so much with low Na diet due to living in a group home.   Of note, the patient was recently admitted for acute decompensated HF in the setting of aflutter w/ RVR. He underwent successful VANESSA/DCCV on 11/22/21. This is his 4th hospitalization in the past year. He was started on bumex/milrinone gtt with good response.     Cards: Alison ([facility])    Pertinent Cardiac Studies  11/18/21 EKG Aflutter LAD. PRWP. NS T wave changes    11/20/21 LVEF <20% LVIDd 6.96cm VTI 5cm, RV severely enlarged with severely reduced systolic HF, sev biatrial enlargement, mod-sev MR, moderate TR, RVSP 38mmHg    11/22/21 VANESSA limited study, LVEF <20%, no FADUMO thrombus    2016 [facility] LM minor irreg, mid LAD 50%, LCx minor irreg, RCA prox 60% mid 85%

## 2022-01-08 NOTE — PROGRESS NOTE ADULT - ASSESSMENT
61 yr old male with CAD, dilated cardiomyopathy, s/p ICD, atrial flutter on Eliquis, substance abuse was admitted initially from 12/24 for decompensated CHF, was on Milrinone and Bumex drips, left AMA on 12/31 and returned to the ED later in the day as he had to take care of personal matter. He reported shortness of breath and right arm and foot pain on presentation. Cardiology was consulted, he was placed on oral Bumex., subsequently transitioned to Bumex gtt. Patient reported he was recently evicted from his apartment and is homeless, social work consult placed.      Decompensated CHF s/p ICD:  Continue Bumex and Milrinone at current rates  Continue Aldactone.  daily weight and strict I/O  Continue statin, metoprolol  Supplemental oxygen  monitor lytes and renal function  continue telemetry  Not candidate for advanced therapies due to smoking/cocaine use history and non-compliance.    Atrial flutter:  Continue Amiodarone,  Metoprolol, Eliquis.    Diabetes mellitus:  Sliding scale coverage, monitor fingersticks.    DERRICK on CKD:  Improving  Pre renal  Monitor renal function on Aldactone and Lasix.    DVT prophylaxis: On Eliquis.           61 yr old male with CAD, dilated cardiomyopathy, s/p ICD, atrial flutter on Eliquis, substance abuse was admitted initially from 12/24 for decompensated CHF, was on Milrinone and Bumex drips, left AMA on 12/31 and returned to the ED later in the day as he had to take care of personal matter. He reported shortness of breath and right arm and foot pain on presentation. Cardiology was consulted, he was placed on oral Bumex., subsequently transitioned to Bumex gtt. Patient reported he was recently evicted from his apartment and is homeless, social work consult placed.      Decompensated HFrEF s/p ICD:  Continue Bumex and Milrinone at current rates  Continue Aldactone.  daily weight and strict I/O  Continue statin, metoprolol  Supplemental oxygen  monitor lytes and renal function  continue telemetry  Not candidate for advanced therapies due to smoking/cocaine use history and non-compliance.    Atrial flutter:  Continue Amiodarone,  Metoprolol, Eliquis.    Diabetes mellitus:  Sliding scale coverage, monitor fingersticks.    DERRICK on CKD:  Improving  Pre renal  Monitor renal function on Aldactone and Lasix.    DVT prophylaxis: On Eliquis.

## 2022-01-08 NOTE — PROGRESS NOTE ADULT - PROBLEM SELECTOR PLAN 1
ACC/AHA stage C, NICMP (LVEF <20%, LVIDd 6.96cm)  previously w/ cool extremities, now warm on inotropic support  Inotropic support: Continue Milrinone gtt 0.250 mcg/kg/min  Monitor markers of perfusion daily including lactate, LFTs  Diuretics: Bumex gtt 2 mg/hr.   Please check BMP q12 hrs while on gtt. Maintain K+ >4.0 and Mg >2.0.  1.5L Fluid restriction, strict I&O and daily STANDING weights.  GDMT: Continue Toprol-XL 100mg daily and aldactone 25mg daily.  Renal function has normalized, start low dose entresto 24/26 BID when off inotropic support. start  SGLT2i as outpt.  Device: s/p ICD   Not candidate for advanced therapies due to smoking/cocaine use history and non-compliance.

## 2022-01-09 LAB
ANION GAP SERPL CALC-SCNC: 16 MMOL/L — SIGNIFICANT CHANGE UP (ref 5–17)
BUN SERPL-MCNC: 36.5 MG/DL — HIGH (ref 8–20)
CALCIUM SERPL-MCNC: 8.5 MG/DL — LOW (ref 8.6–10.2)
CHLORIDE SERPL-SCNC: 97 MMOL/L — LOW (ref 98–107)
CO2 SERPL-SCNC: 25 MMOL/L — SIGNIFICANT CHANGE UP (ref 22–29)
CREAT SERPL-MCNC: 1.05 MG/DL — SIGNIFICANT CHANGE UP (ref 0.5–1.3)
GLUCOSE BLDC GLUCOMTR-MCNC: 110 MG/DL — HIGH (ref 70–99)
GLUCOSE BLDC GLUCOMTR-MCNC: 90 MG/DL — SIGNIFICANT CHANGE UP (ref 70–99)
GLUCOSE BLDC GLUCOMTR-MCNC: 97 MG/DL — SIGNIFICANT CHANGE UP (ref 70–99)
GLUCOSE SERPL-MCNC: 104 MG/DL — HIGH (ref 70–99)
HCT VFR BLD CALC: 40.6 % — SIGNIFICANT CHANGE UP (ref 39–50)
HGB BLD-MCNC: 12.8 G/DL — LOW (ref 13–17)
MAGNESIUM SERPL-MCNC: 2.1 MG/DL — SIGNIFICANT CHANGE UP (ref 1.6–2.6)
MCHC RBC-ENTMCNC: 27.4 PG — SIGNIFICANT CHANGE UP (ref 27–34)
MCHC RBC-ENTMCNC: 31.5 GM/DL — LOW (ref 32–36)
MCV RBC AUTO: 86.8 FL — SIGNIFICANT CHANGE UP (ref 80–100)
PLATELET # BLD AUTO: 222 K/UL — SIGNIFICANT CHANGE UP (ref 150–400)
POTASSIUM SERPL-MCNC: 3.7 MMOL/L — SIGNIFICANT CHANGE UP (ref 3.5–5.3)
POTASSIUM SERPL-SCNC: 3.7 MMOL/L — SIGNIFICANT CHANGE UP (ref 3.5–5.3)
RBC # BLD: 4.68 M/UL — SIGNIFICANT CHANGE UP (ref 4.2–5.8)
RBC # FLD: 16.4 % — HIGH (ref 10.3–14.5)
SARS-COV-2 RNA SPEC QL NAA+PROBE: SIGNIFICANT CHANGE UP
SODIUM SERPL-SCNC: 138 MMOL/L — SIGNIFICANT CHANGE UP (ref 135–145)
WBC # BLD: 6 K/UL — SIGNIFICANT CHANGE UP (ref 3.8–10.5)
WBC # FLD AUTO: 6 K/UL — SIGNIFICANT CHANGE UP (ref 3.8–10.5)

## 2022-01-09 PROCEDURE — 99234 HOSP IP/OBS SM DT SF/LOW 45: CPT

## 2022-01-09 PROCEDURE — 99233 SBSQ HOSP IP/OBS HIGH 50: CPT

## 2022-01-09 RX ADMIN — GABAPENTIN 100 MILLIGRAM(S): 400 CAPSULE ORAL at 05:08

## 2022-01-09 RX ADMIN — APIXABAN 5 MILLIGRAM(S): 2.5 TABLET, FILM COATED ORAL at 05:09

## 2022-01-09 RX ADMIN — ATORVASTATIN CALCIUM 40 MILLIGRAM(S): 80 TABLET, FILM COATED ORAL at 21:27

## 2022-01-09 RX ADMIN — Medication 81 MILLIGRAM(S): at 09:03

## 2022-01-09 RX ADMIN — BUMETANIDE 10 MG/HR: 0.25 INJECTION INTRAMUSCULAR; INTRAVENOUS at 21:27

## 2022-01-09 RX ADMIN — GABAPENTIN 100 MILLIGRAM(S): 400 CAPSULE ORAL at 21:27

## 2022-01-09 RX ADMIN — Medication 100 MILLIGRAM(S): at 05:09

## 2022-01-09 RX ADMIN — Medication 650 MILLIGRAM(S): at 05:09

## 2022-01-09 RX ADMIN — AMIODARONE HYDROCHLORIDE 200 MILLIGRAM(S): 400 TABLET ORAL at 05:08

## 2022-01-09 RX ADMIN — APIXABAN 5 MILLIGRAM(S): 2.5 TABLET, FILM COATED ORAL at 17:35

## 2022-01-09 RX ADMIN — SPIRONOLACTONE 25 MILLIGRAM(S): 25 TABLET, FILM COATED ORAL at 05:08

## 2022-01-09 RX ADMIN — Medication 650 MILLIGRAM(S): at 05:39

## 2022-01-09 RX ADMIN — MILRINONE LACTATE 5.63 MICROGRAM(S)/KG/MIN: 1 INJECTION, SOLUTION INTRAVENOUS at 09:03

## 2022-01-09 RX ADMIN — GABAPENTIN 100 MILLIGRAM(S): 400 CAPSULE ORAL at 13:58

## 2022-01-09 NOTE — PROGRESS NOTE ADULT - SUBJECTIVE AND OBJECTIVE BOX
[facility] Division of Hospital Medicine    Chief Complaint:  CHF    SUBJECTIVE / OVERNIGHT EVENTS: Patient seen and examined. Reports less leg swelling and improvement in breathing. Having BMs. Asking for shrimp and tartar sauce,     Patient denies chest pain, SOB, abd pain, N/V, fever, chills, dysuria or any other complaints. All remainder ROS negative.     MEDICATIONS  (STANDING):  aMIOdarone    Tablet 200 milliGRAM(s) Oral daily  apixaban 5 milliGRAM(s) Oral every 12 hours  aspirin enteric coated 81 milliGRAM(s) Oral daily  atorvastatin 40 milliGRAM(s) Oral at bedtime  buMETAnide Infusion 2 mG/Hr (10 mL/Hr) IV Continuous <Continuous>  dextrose 40% Gel 15 Gram(s) Oral once  dextrose 5%. 1000 milliLiter(s) (100 mL/Hr) IV Continuous <Continuous>  dextrose 5%. 1000 milliLiter(s) (50 mL/Hr) IV Continuous <Continuous>  dextrose 50% Injectable 25 Gram(s) IV Push once  dextrose 50% Injectable 12.5 Gram(s) IV Push once  dextrose 50% Injectable 25 Gram(s) IV Push once  gabapentin 100 milliGRAM(s) Oral three times a day  glucagon  Injectable 1 milliGRAM(s) IntraMuscular once  influenza   Vaccine 0.5 milliLiter(s) IntraMuscular once  insulin lispro (ADMELOG) corrective regimen sliding scale   SubCutaneous three times a day before meals  metoprolol succinate  milliGRAM(s) Oral daily  milrinone Infusion 0.25 MICROgram(s)/kG/Min (5.63 mL/Hr) IV Continuous <Continuous>  spironolactone 25 milliGRAM(s) Oral daily    MEDICATIONS  (PRN):  acetaminophen     Tablet .. 650 milliGRAM(s) Oral every 6 hours PRN Temp greater or equal to 38C (100.4F), Mild Pain (1 - 3)  aluminum hydroxide/magnesium hydroxide/simethicone Suspension 30 milliLiter(s) Oral every 4 hours PRN Dyspepsia  melatonin 3 milliGRAM(s) Oral at bedtime PRN Insomnia  ondansetron Injectable 4 milliGRAM(s) IV Push every 8 hours PRN Nausea and/or Vomiting        I&O's Summary    08 Jan 2022 07:01  -  09 Jan 2022 07:00  --------------------------------------------------------  IN: 583.2 mL / OUT: 5150 mL / NET: -4566.8 mL        PHYSICAL EXAM:  Vital Signs Last 24 Hrs  T(C): 36.4 (09 Jan 2022 09:06), Max: 36.7 (08 Jan 2022 21:09)  T(F): 97.5 (09 Jan 2022 09:06), Max: 98 (08 Jan 2022 21:09)  HR: 74 (09 Jan 2022 09:06) (74 - 95)  BP: 108/72 (09 Jan 2022 09:06) (104/62 - 124/68)  BP(mean): --  RR: 18 (09 Jan 2022 09:06) (18 - 18)  SpO2: 98% (09 Jan 2022 09:06) (95% - 98%)        CONSTITUTIONAL: NAD  ENMT: Moist oral mucosa, no pharyngeal injection or exudates  RESPIRATORY: Normal respiratory effort; lungs are clear to auscultation bilaterally  CARDIOVASCULAR: Regular rate and rhythm, normal S1 and S2, + lower extremity edema;  ABDOMEN: Nontender to palpation, normoactive bowel sounds, no rebound/guarding;   MUSCLOSKELETAL:  no clubbing or cyanosis of digits; no joint swelling or tenderness to palpation  PSYCH: A+O to person, place, and time; affect appropriate  NEUROLOGY: CN 2-12 are intact and symmetric; no gross sensory deficits;   SKIN: No rashes; no palpable lesions    LABS:                        12.8   6.00  )-----------( 222      ( 09 Jan 2022 05:58 )             40.6     01-09    138  |  97<L>  |  36.5<H>  ----------------------------<  104<H>  3.7   |  25.0  |  1.05    Ca    8.5<L>      09 Jan 2022 05:58  Mg     2.1     01-09    TPro  6.7  /  Alb  3.6  /  TBili  1.1  /  DBili  0.5<H>  /  AST  58<H>  /  ALT  40  /  AlkPhos  310<H>  01-08              CAPILLARY BLOOD GLUCOSE      POCT Blood Glucose.: 90 mg/dL (09 Jan 2022 08:08)  POCT Blood Glucose.: 114 mg/dL (08 Jan 2022 21:06)  POCT Blood Glucose.: 102 mg/dL (08 Jan 2022 17:15)  POCT Blood Glucose.: 101 mg/dL (08 Jan 2022 12:17)        RADIOLOGY & ADDITIONAL TESTS:  Results Reviewed:   Imaging Personally Reviewed:  Electrocardiogram Personally Reviewed:

## 2022-01-09 NOTE — PROGRESS NOTE ADULT - ATTENDING COMMENTS
-diuresing very well, >5 liters urine output continue current milrinone/bumex gtt as still with >20Kg fluid build up  -continue Eliquis, metoprolol/Amio for pAflutter s/p prior VANESSA/CV  -continue spironolactone, eventual ARNi when near euvolemic   -patient has no family/relative and living in shelter housing and not able to follow up outpatient, lack of social support limiting future advanced HF therapy, need social work intervention        Micah Mullins DO, [facility]  Faculty Non-Invasive Cardiologist  [phone]

## 2022-01-09 NOTE — PROGRESS NOTE ADULT - ASSESSMENT
61 yr old male with CAD, dilated cardiomyopathy, s/p ICD, atrial flutter on Eliquis, substance abuse was admitted initially from 12/24 for decompensated CHF, was on Milrinone and Bumex drips, left AMA on 12/31 and returned to the ED later in the day as he had to take care of personal matter. He reported shortness of breath and right arm and foot pain on presentation. Cardiology was consulted, he was placed on oral Bumex., subsequently transitioned to Bumex gtt. Patient reported he was recently evicted from his apartment and is homeless, social work consult placed.      Decompensated HFrEF s/p ICD:  Continue Bumex and Milrinone at current rates  Continue Aldactone.  daily weight and strict I/O  Continue statin, metoprolol  Supplemental oxygen  monitor lytes and renal function  continue telemetry  Not candidate for advanced therapies due to smoking/cocaine use history and non-compliance.    Atrial flutter:  Continue Amiodarone,  Metoprolol, Eliquis.    Diabetes mellitus:  Sliding scale coverage, monitor fingersticks.    DERRICK on CKD:  Improving  Pre renal  Monitor renal function on Aldactone and Lasix.    DVT prophylaxis: On Eliquis.  Dispo: patient remains medically acute on IV Bumex and Milrinone

## 2022-01-09 NOTE — PROGRESS NOTE ADULT - PROBLEM SELECTOR PLAN 1
ACC/AHA stage C, NICMP (LVEF <20%, LVIDd 6.96cm)  previously w/ cool extremities, now warm on inotropic support  still grossly volume overloaded but with good urine output   goal net negative 2-3L net negative in 24h   Inotropic support: Continue Milrinone gtt 0.250 mcg/kg/min  Monitor markers of perfusion daily including lactate, LFTs  Diuretics: Bumex gtt 2 mg/hr.   Please check BMP q12 hrs while on gtt. Maintain K+ >4.0 and Mg >2.0.  1.5L Fluid restriction, strict I&O and daily STANDING weights.  GDMT: Continue Toprol-XL 100mg daily and aldactone 25mg daily.  Renal function has normalized, start low dose entresto 24/26 BID when off inotropic support. start  SGLT2i as outpt.  Device: s/p ICD   Not candidate for advanced therapies due to smoking/cocaine use history and non-compliance.

## 2022-01-09 NOTE — PROGRESS NOTE ADULT - ASSESSMENT
60 y/o with h/o chronic systolic HF ACC/AHA stage C, NICMP LVEF <20% LVIDd 6.96cm, nonobstructive CAD, s/p ICD, HTN, active tobacco use, ?COPD who presented with decompensated HF. He reports being compliant with medications but not so much with low Na diet due to living in a group home.   Of note, the patient was recently admitted for acute decompensated HF in the setting of aflutter w/ RVR. He underwent successful VANESSA/DCCV on 11/22/21. This is his 4th hospitalization in the past year. He was started on bumex/milrinone gtt with good response.     Cards: Alison ([facility])    Pertinent Cardiac Studies  11/18/21 EKG Aflutter LAD. PRWP. NS T wave changes    11/20/21 LVEF <20% LVIDd 6.96cm VTI 5cm, RV severely enlarged with severely reduced systolic HF, sev biatrial enlargement, mod-sev MR, moderate TR, RVSP 38mmHg    11/22/21 VANESSA limited study, LVEF <20%, no FADUMO thrombus    2016 [facility] LM minor irreg, mid LAD 50%, LCx minor irreg, RCA prox 60% mid 85%

## 2022-01-09 NOTE — PROGRESS NOTE ADULT - SUBJECTIVE AND OBJECTIVE BOX
[facility] [id number]            [facility]                          [address] [address]                       Phone: [phone]. Fax:[phone]                      ________________________________________________    HPI:  61 year old male with history of CAD, Severely Dilated CM/HFrEF s/p ICD, Substance abuse. Atrial Flutter on Eliquis, ETOH use who presented to the ED with complaints of right arm and foot pain with increased shortness of breath due to medication non-compliance and admitted with decompensated heart failure on  then pt left against medical advice on  for personal matter. Pt returned this evening to be readmitted. Pt states he was getting evicted and needs to get all his belongings including a photo frame of him and his mother that means a lot to him. He states that he dropped his stuff off at his friend house then took taxi back to the hospital. currently stable without cp, sob, denies substance abuse or drinking alcoholic beverages.    (2022 03:37)    ROS: All review of systems negative unless indicated otherwise below.                         PHYSICAL EXAM:    GENERAL: NAD  NECK: Supple, significant JVD but improving   NERVOUS SYSTEM:  Alert & Oriented X3, non focal neuro exam.   CHEST/LUNG: diminished lungs, bibasilar crackles   HEART: Regular rate and rhythm; s1 and s2 auscultated, No murmurs, rubs, or gallops  ABDOMEN: Soft, Nontender, Nondistended; Bowel sounds present and normoactive.   EXTREMITIES:  2+ Peripheral Pulses, No clubbing, cyanosis. LE edema +2 wrapped in ACE       Vital Signs Last 24 Hrs  T(C): 36.4 (2022 09:06), Max: 36.7 (2022 21:09)  T(F): 97.5 (2022 09:06), Max: 98 (2022 21:09)  HR: 74 (2022 09:06) (74 - 95)  BP: 108/72 (2022 09:06) (104/62 - 124/68)  BP(mean): --  RR: 18 (2022 09:06) (18 - 18)  SpO2: 98% (2022 09:06) (95% - 98%)                                                   DAILY WEIGHTS - 48 HOUR TREND     Daily Weight in k.9 (2022 05:36)                             INTAKE AND OUTPUT - 48 HOUR TREND     22 @ 07:01  -  22 @ 07:00  --------------------------------------------------------  IN:  Total IN: 0 mL    OUT:    Voided (mL): 3820 mL  Total OUT: 3820 mL    Total NET: -3820 mL      22 @ 07:01  -  22 @ 07:00  --------------------------------------------------------  IN:  Total IN: 0 mL    OUT:    Voided (mL): 5150 mL  Total OUT: 5150 mL    Total NET: -5150 mL                                                           LAB RESULTS                 COMPLETE BLOOD COUNT( 2022 05:58 )                            12.8 g/dL<L>  6.00 K/uL )---------------( 222 K/uL                        40.6 %      Automated Differential     Auto Basophil # - X      Auto Basophil % - X      Auto Eosinophil # - X      Auto Eosinophil % - X      Auto Immature Granulocyte # - X      Auto Immature Granulocyte % - X      Auto Lymphocyte # - X      Auto Lymphocyte % - X      Auto Monocyte # - X      Auto Monocyte % - X      Auto Neutrophil # - X      Auto Neutrophil % - X                                      CHEMISTRY                 Basic Metabolic Panel (22 @ 05:58)    138  |  97<L>  |  36.5<H>  ----------------------------<  104<H>  3.7   |  25.0  |  1.05    Ca    8.5<L>      2022 05:58  Phos  3.2     01-03  Mg     2.1     01-09                    Liver Functions (22 @ 05:58))  TPro  6.7  /  Alb  3.6  /  TBili  1.1  /  DBili  0.5  /  AST  58  /  ALT  40  /  AlkPhos  310     PT/INR/PTT ( 28 Dec 2021 05:48 )                        :                       :      16.7         :       X                     .        .                   .              .           .       1.47        .                                                                 Cardiac Enzymes   ( 28 Dec 2021 05:48 )  Troponin T  X    ,  CPK  X    , CKMB  X    , BNP 12393<H>                             Current Admission Active Medications  acetaminophen     Tablet .. 650 milliGRAM(s) Oral every 6 hours PRN Temp greater or equal to 38C (100.4F), Mild Pain (1 - 3)  aluminum hydroxide/magnesium hydroxide/simethicone Suspension 30 milliLiter(s) Oral every 4 hours PRN Dyspepsia  aMIOdarone    Tablet 200 milliGRAM(s) Oral daily  apixaban 5 milliGRAM(s) Oral every 12 hours  aspirin enteric coated 81 milliGRAM(s) Oral daily  atorvastatin 40 milliGRAM(s) Oral at bedtime  buMETAnide Infusion 2 mG/Hr (10 mL/Hr) IV Continuous <Continuous>  dextrose 40% Gel 15 Gram(s) Oral once  dextrose 5%. 1000 milliLiter(s) (50 mL/Hr) IV Continuous <Continuous>  dextrose 5%. 1000 milliLiter(s) (100 mL/Hr) IV Continuous <Continuous>  dextrose 50% Injectable 25 Gram(s) IV Push once  dextrose 50% Injectable 12.5 Gram(s) IV Push once  dextrose 50% Injectable 25 Gram(s) IV Push once  gabapentin 100 milliGRAM(s) Oral three times a day  glucagon  Injectable 1 milliGRAM(s) IntraMuscular once  influenza   Vaccine 0.5 milliLiter(s) IntraMuscular once  insulin lispro (ADMELOG) corrective regimen sliding scale   SubCutaneous three times a day before meals  melatonin 3 milliGRAM(s) Oral at bedtime PRN Insomnia  metoprolol succinate  milliGRAM(s) Oral daily  milrinone Infusion 0.25 MICROgram(s)/kG/Min (5.63 mL/Hr) IV Continuous <Continuous>  ondansetron Injectable 4 milliGRAM(s) IV Push every 8 hours PRN Nausea and/or Vomiting  spironolactone 25 milliGRAM(s) Oral daily

## 2022-01-10 LAB
ALBUMIN SERPL ELPH-MCNC: 3.8 G/DL — SIGNIFICANT CHANGE UP (ref 3.3–5.2)
ALP SERPL-CCNC: 346 U/L — HIGH (ref 40–120)
ALT FLD-CCNC: 45 U/L — HIGH
ANION GAP SERPL CALC-SCNC: 15 MMOL/L — SIGNIFICANT CHANGE UP (ref 5–17)
AST SERPL-CCNC: 52 U/L — HIGH
BILIRUB SERPL-MCNC: 1 MG/DL — SIGNIFICANT CHANGE UP (ref 0.4–2)
BUN SERPL-MCNC: 38 MG/DL — HIGH (ref 8–20)
CALCIUM SERPL-MCNC: 8.7 MG/DL — SIGNIFICANT CHANGE UP (ref 8.6–10.2)
CHLORIDE SERPL-SCNC: 96 MMOL/L — LOW (ref 98–107)
CO2 SERPL-SCNC: 26 MMOL/L — SIGNIFICANT CHANGE UP (ref 22–29)
CREAT SERPL-MCNC: 1.21 MG/DL — SIGNIFICANT CHANGE UP (ref 0.5–1.3)
GLUCOSE BLDC GLUCOMTR-MCNC: 103 MG/DL — HIGH (ref 70–99)
GLUCOSE BLDC GLUCOMTR-MCNC: 126 MG/DL — HIGH (ref 70–99)
GLUCOSE BLDC GLUCOMTR-MCNC: 136 MG/DL — HIGH (ref 70–99)
GLUCOSE BLDC GLUCOMTR-MCNC: 94 MG/DL — SIGNIFICANT CHANGE UP (ref 70–99)
GLUCOSE SERPL-MCNC: 96 MG/DL — SIGNIFICANT CHANGE UP (ref 70–99)
HCT VFR BLD CALC: 43.7 % — SIGNIFICANT CHANGE UP (ref 39–50)
HGB BLD-MCNC: 13.9 G/DL — SIGNIFICANT CHANGE UP (ref 13–17)
MAGNESIUM SERPL-MCNC: 2 MG/DL — SIGNIFICANT CHANGE UP (ref 1.6–2.6)
MCHC RBC-ENTMCNC: 27.9 PG — SIGNIFICANT CHANGE UP (ref 27–34)
MCHC RBC-ENTMCNC: 31.8 GM/DL — LOW (ref 32–36)
MCV RBC AUTO: 87.8 FL — SIGNIFICANT CHANGE UP (ref 80–100)
PLATELET # BLD AUTO: 233 K/UL — SIGNIFICANT CHANGE UP (ref 150–400)
POTASSIUM SERPL-MCNC: 4 MMOL/L — SIGNIFICANT CHANGE UP (ref 3.5–5.3)
POTASSIUM SERPL-SCNC: 4 MMOL/L — SIGNIFICANT CHANGE UP (ref 3.5–5.3)
PROT SERPL-MCNC: 7.4 G/DL — SIGNIFICANT CHANGE UP (ref 6.6–8.7)
RBC # BLD: 4.98 M/UL — SIGNIFICANT CHANGE UP (ref 4.2–5.8)
RBC # FLD: 16.4 % — HIGH (ref 10.3–14.5)
SODIUM SERPL-SCNC: 137 MMOL/L — SIGNIFICANT CHANGE UP (ref 135–145)
WBC # BLD: 6.28 K/UL — SIGNIFICANT CHANGE UP (ref 3.8–10.5)
WBC # FLD AUTO: 6.28 K/UL — SIGNIFICANT CHANGE UP (ref 3.8–10.5)

## 2022-01-10 PROCEDURE — 99233 SBSQ HOSP IP/OBS HIGH 50: CPT

## 2022-01-10 RX ADMIN — Medication 81 MILLIGRAM(S): at 18:07

## 2022-01-10 RX ADMIN — GABAPENTIN 100 MILLIGRAM(S): 400 CAPSULE ORAL at 05:30

## 2022-01-10 RX ADMIN — APIXABAN 5 MILLIGRAM(S): 2.5 TABLET, FILM COATED ORAL at 18:07

## 2022-01-10 RX ADMIN — GABAPENTIN 100 MILLIGRAM(S): 400 CAPSULE ORAL at 13:18

## 2022-01-10 RX ADMIN — BUMETANIDE 10 MG/HR: 0.25 INJECTION INTRAMUSCULAR; INTRAVENOUS at 20:15

## 2022-01-10 RX ADMIN — SPIRONOLACTONE 25 MILLIGRAM(S): 25 TABLET, FILM COATED ORAL at 05:28

## 2022-01-10 RX ADMIN — Medication 100 MILLIGRAM(S): at 05:28

## 2022-01-10 RX ADMIN — AMIODARONE HYDROCHLORIDE 200 MILLIGRAM(S): 400 TABLET ORAL at 05:28

## 2022-01-10 RX ADMIN — Medication 1 TABLET(S): at 18:08

## 2022-01-10 RX ADMIN — GABAPENTIN 100 MILLIGRAM(S): 400 CAPSULE ORAL at 20:14

## 2022-01-10 RX ADMIN — APIXABAN 5 MILLIGRAM(S): 2.5 TABLET, FILM COATED ORAL at 05:28

## 2022-01-10 RX ADMIN — ATORVASTATIN CALCIUM 40 MILLIGRAM(S): 80 TABLET, FILM COATED ORAL at 20:14

## 2022-01-10 NOTE — PROGRESS NOTE ADULT - ASSESSMENT
62 y/o with h/o chronic systolic HF ACC/AHA stage C, NICMP LVEF <20% LVIDd 6.96cm, nonobstructive CAD, s/p ICD, HTN, active tobacco use, ?COPD who presented with decompensated HF. He reports being compliant with medications but not so much with low Na diet due to living in a group home.   Of note, the patient was recently admitted for acute decompensated HF in the setting of aflutter w/ RVR. He underwent successful VANESSA/DCCV on 11/22/21. This is his 4th hospitalization in the past year. He was started on bumex/milrinone gtt with good response.     Cards: Alison ([facility])    Pertinent Cardiac Studies  11/18/21 EKG Aflutter LAD. PRWP. NS T wave changes    11/20/21 LVEF <20% LVIDd 6.96cm VTI 5cm, RV severely enlarged with severely reduced systolic HF, sev biatrial enlargement, mod-sev MR, moderate TR, RVSP 38mmHg    11/22/21 VANESSA limited study, LVEF <20%, no FADUMO thrombus    2016 [facility] LM minor irreg, mid LAD 50%, LCx minor irreg, RCA prox 60% mid 85%

## 2022-01-10 NOTE — PROGRESS NOTE ADULT - ASSESSMENT
61 yr old male with CAD, dilated cardiomyopathy, s/p ICD, atrial flutter on Eliquis, substance abuse was admitted initially from 12/24 for decompensated CHF, was on Milrinone and Bumex drips, left AMA on 12/31 and returned to the ED later in the day as he had to take care of personal matter. He reported shortness of breath and right arm and foot pain on presentation. Cardiology was consulted, he was placed on oral Bumex., subsequently transitioned to Bumex gtt. Patient reported he was recently evicted from his apartment and is homeless, social work consult placed.      Decompensated HFrEF s/p ICD:  Continue Bumex and Milrinone at current rates  Metoloazone 5mg x1 dose today  Continue Aldactone.  daily weight and strict I/O  Continue statin, metoprolol  Supplemental oxygen  monitor lytes and renal function  continue telemetry  Not candidate for advanced therapies due to smoking/cocaine use history and non-compliance.    Atrial flutter:  Continue Amiodarone,  Metoprolol, Eliquis.    Diabetes mellitus:  Sliding scale coverage, monitor fingersticks.    DERRICK on CKD:  Improving  Pre renal  Monitor renal function on Aldactone and Lasix.    DVT prophylaxis: On Eliquis.  Dispo: patient remains medically acute on IV Bumex and Milrinone  Discussed with HF team.

## 2022-01-10 NOTE — CHART NOTE - NSCHARTNOTEFT_GEN_A_CORE
Source: Patient [ ]  Family [ ]   other [ x]    Current Diet: Diet, DASH/TLC:   Sodium & Cholesterol Restricted  1500mL Fluid Restriction ([id number])  Supplement Feeding Modality:  Oral  Ensure Enlive Cans or Servings Per Day:  1       Frequency:  Two Times a day (01-10-22 @ 07:48)    PO intake:  < 50% [ ]   50-75%  [ ]   %  [x ]      Current Weight:   (1/10) 163.5 lbs  (1/8) 166.2 lbs    % Weight Change - minimal wt loss noted, likely due to fluid loss.  Aware pt with 1+ trace generalized edema noted per documentation.     Pertinent Medications: MEDICATIONS  (STANDING):  aMIOdarone    Tablet 200 milliGRAM(s) Oral daily  apixaban 5 milliGRAM(s) Oral every 12 hours  aspirin enteric coated 81 milliGRAM(s) Oral daily  atorvastatin 40 milliGRAM(s) Oral at bedtime  buMETAnide Infusion 2 mG/Hr (10 mL/Hr) IV Continuous <Continuous>  dextrose 40% Gel 15 Gram(s) Oral once  dextrose 5%. 1000 milliLiter(s) (50 mL/Hr) IV Continuous <Continuous>  dextrose 5%. 1000 milliLiter(s) (100 mL/Hr) IV Continuous <Continuous>  dextrose 50% Injectable 25 Gram(s) IV Push once  dextrose 50% Injectable 12.5 Gram(s) IV Push once  dextrose 50% Injectable 25 Gram(s) IV Push once  gabapentin 100 milliGRAM(s) Oral three times a day  glucagon  Injectable 1 milliGRAM(s) IntraMuscular once  influenza   Vaccine 0.5 milliLiter(s) IntraMuscular once  insulin lispro (ADMELOG) corrective regimen sliding scale   SubCutaneous three times a day before meals  metolazone 5 milliGRAM(s) Oral once  metoprolol succinate  milliGRAM(s) Oral daily  milrinone Infusion 0.25 MICROgram(s)/kG/Min (5.63 mL/Hr) IV Continuous <Continuous>  spironolactone 25 milliGRAM(s) Oral daily    MEDICATIONS  (PRN):  acetaminophen     Tablet .. 650 milliGRAM(s) Oral every 6 hours PRN Temp greater or equal to 38C (100.4F), Mild Pain (1 - 3)  aluminum hydroxide/magnesium hydroxide/simethicone Suspension 30 milliLiter(s) Oral every 4 hours PRN Dyspepsia  melatonin 3 milliGRAM(s) Oral at bedtime PRN Insomnia  ondansetron Injectable 4 milliGRAM(s) IV Push every 8 hours PRN Nausea and/or Vomiting    Pertinent Labs: CBC Full  -  ( 10 David 2022 06:15 )  WBC Count : 6.28 K/uL  RBC Count : 4.98 M/uL  Hemoglobin : 13.9 g/dL  Hematocrit : 43.7 %  Platelet Count - Automated : 233 K/uL  Mean Cell Volume : 87.8 fl  Mean Cell Hemoglobin : 27.9 pg  Mean Cell Hemoglobin Concentration : 31.8 gm/dL  01-10 Na137 mmol/L Glu 96 mg/dL K+ 4.0 mmol/L Cr  1.21 mg/dL BUN 38.0 mg/dL<H> Phos n/a   Alb 3.8 g/dL PAB n/a       Skin: no skin breakdown noted     Nutrition focused physical exam conducted - found signs of malnutrition [ ]absent [ ]present    Subcutaneous fat loss: [x ] Orbital fat pads region, [ ]Buccal fat region, [ ]Triceps region,  [ ]Ribs region    Muscle wasting: [ x]Temples region, [x ]Clavicle region, [x ]Shoulder region, [ ]Scapula region, [ ]Interosseous region,  [ ]thigh region, [ ]Calf region    Current Nutrition Diagnosis: Pt remains at nutrition risk secondary to moderate protein calorie malnutrition (chronic) related to inability to consume sufficient protein energy intake in setting of limited access to healthful food at times due to housing issues as evidenced by pt meeting <75% estimated energy intake > 1 month and moderate muscle wasting.  Pt sleeping at time of interview, but continues with good po intake at meals.  RD to remain available.     Recommendations:   1. Continue with Ensure BID  2. RX: MVI daily   3. Obtain daily weight and monitor trend     Monitoring and Evaluation:   [x ] PO intake [x ] Tolerance to diet prescription [X] Weights  [X] Follow up per protocol [X] Labs:

## 2022-01-10 NOTE — PROGRESS NOTE ADULT - PROBLEM SELECTOR PLAN 1
ACC/AHA stage C, NICMP (LVEF <20%, LVIDd 6.96cm)  Clinically hypervolemic, cool extremities.    Inotropic support: Continue Milrinone gtt 0.250 mcg/kg/min  Monitor markers of perfusion daily including lactate, LFTs  Diuretics: Bumex gtt 2 mg/hr. PLease give Metolazone 5mg PO x 1.   Please check BMP q12 hrs while on gtt. Maintain K+ >4.0 and Mg >2.0.  1.5L Fluid restriction, strict I&O and daily STANDING weights.  GDMT: Continue Toprol-XL 100mg daily and aldactone 25mg daily. Plan to optimize w/ ACEi/ARB/ARNi once euvolemic. SGLT2i as outpt.  Device: s/p ICD   Not candidate for advanced therapies due to smoking/cocaine use history and non-compliance.  Please obtain CxR today

## 2022-01-10 NOTE — PROGRESS NOTE ADULT - SUBJECTIVE AND OBJECTIVE BOX
[facility] Division of Hospital Medicine    Chief Complaint:  CHF    SUBJECTIVE / OVERNIGHT EVENTS: Patient seen and examined. Legs are getting less swollen but his breathing waxes and wanes.     Patient denies chest pain, abd pain, N/V, fever, chills, dysuria or any other complaints. All remainder ROS negative.     MEDICATIONS  (STANDING):  aMIOdarone    Tablet 200 milliGRAM(s) Oral daily  apixaban 5 milliGRAM(s) Oral every 12 hours  aspirin enteric coated 81 milliGRAM(s) Oral daily  atorvastatin 40 milliGRAM(s) Oral at bedtime  buMETAnide Infusion 2 mG/Hr (10 mL/Hr) IV Continuous <Continuous>  dextrose 40% Gel 15 Gram(s) Oral once  dextrose 5%. 1000 milliLiter(s) (50 mL/Hr) IV Continuous <Continuous>  dextrose 5%. 1000 milliLiter(s) (100 mL/Hr) IV Continuous <Continuous>  dextrose 50% Injectable 25 Gram(s) IV Push once  dextrose 50% Injectable 12.5 Gram(s) IV Push once  dextrose 50% Injectable 25 Gram(s) IV Push once  gabapentin 100 milliGRAM(s) Oral three times a day  glucagon  Injectable 1 milliGRAM(s) IntraMuscular once  influenza   Vaccine 0.5 milliLiter(s) IntraMuscular once  insulin lispro (ADMELOG) corrective regimen sliding scale   SubCutaneous three times a day before meals  metolazone 5 milliGRAM(s) Oral once  metoprolol succinate  milliGRAM(s) Oral daily  milrinone Infusion 0.25 MICROgram(s)/kG/Min (5.63 mL/Hr) IV Continuous <Continuous>  spironolactone 25 milliGRAM(s) Oral daily    MEDICATIONS  (PRN):  acetaminophen     Tablet .. 650 milliGRAM(s) Oral every 6 hours PRN Temp greater or equal to 38C (100.4F), Mild Pain (1 - 3)  aluminum hydroxide/magnesium hydroxide/simethicone Suspension 30 milliLiter(s) Oral every 4 hours PRN Dyspepsia  melatonin 3 milliGRAM(s) Oral at bedtime PRN Insomnia  ondansetron Injectable 4 milliGRAM(s) IV Push every 8 hours PRN Nausea and/or Vomiting        I&O's Summary    09 Jan 2022 07:01  -  10 David 2022 07:00  --------------------------------------------------------  IN: 669.4 mL / OUT: 1900 mL / NET: -1230.6 mL        PHYSICAL EXAM:  Vital Signs Last 24 Hrs  T(C): 36.7 (10 David 2022 08:00), Max: 36.9 (09 Jan 2022 21:20)  T(F): 98.1 (10 David 2022 08:00), Max: 98.4 (09 Jan 2022 21:20)  HR: 81 (10 David 2022 08:00) (78 - 82)  BP: 115/80 (10 David 2022 08:00) (109/75 - 116/72)  BP(mean): 87 (09 Jan 2022 15:50) (87 - 87)  RR: 17 (10 David 2022 08:00) (17 - 18)  SpO2: 97% (10 David 2022 08:00) (92% - 98%)      CONSTITUTIONAL: NAD  ENMT: Moist oral mucosa, no pharyngeal injection or exudates  RESPIRATORY: Normal respiratory effort; lungs are clear to auscultation bilaterally  CARDIOVASCULAR: Regular rate and rhythm, normal S1 and S2, + lower extremity edema;  ABDOMEN: Nontender to palpation, normoactive bowel sounds, no rebound/guarding;   MUSCLOSKELETAL:  no clubbing or cyanosis of digits; no joint swelling or tenderness to palpation  PSYCH: A+O to person, place, and time; affect appropriate  NEUROLOGY: CN 2-12 are intact and symmetric; no gross sensory deficits;   SKIN: No rashes; no palpable lesions    LABS:                        13.9   6.28  )-----------( 233      ( 10 David 2022 06:15 )             43.7     01-10    137  |  96<L>  |  38.0<H>  ----------------------------<  96  4.0   |  26.0  |  1.21    Ca    8.7      10 David 2022 06:15  Mg     2.0     01-10    TPro  7.4  /  Alb  3.8  /  TBili  1.0  /  DBili  x   /  AST  52<H>  /  ALT  45<H>  /  AlkPhos  346<H>  01-10              CAPILLARY BLOOD GLUCOSE      POCT Blood Glucose.: 103 mg/dL (10 David 2022 08:31)  POCT Blood Glucose.: 110 mg/dL (09 Jan 2022 16:59)  POCT Blood Glucose.: 97 mg/dL (09 Jan 2022 13:17)        RADIOLOGY & ADDITIONAL TESTS:  Results Reviewed:   Imaging Personally Reviewed:  Electrocardiogram Personally Reviewed:

## 2022-01-10 NOTE — PROGRESS NOTE ADULT - SUBJECTIVE AND OBJECTIVE BOX
Subjective:  No overnight events    Medications:  acetaminophen     Tablet .. 650 milliGRAM(s) Oral every 6 hours PRN  aluminum hydroxide/magnesium hydroxide/simethicone Suspension 30 milliLiter(s) Oral every 4 hours PRN  aMIOdarone    Tablet 200 milliGRAM(s) Oral daily  apixaban 5 milliGRAM(s) Oral every 12 hours  aspirin enteric coated 81 milliGRAM(s) Oral daily  atorvastatin 40 milliGRAM(s) Oral at bedtime  buMETAnide Infusion 2 mG/Hr IV Continuous <Continuous>  dextrose 40% Gel 15 Gram(s) Oral once  dextrose 5%. 1000 milliLiter(s) IV Continuous <Continuous>  dextrose 5%. 1000 milliLiter(s) IV Continuous <Continuous>  dextrose 50% Injectable 25 Gram(s) IV Push once  dextrose 50% Injectable 12.5 Gram(s) IV Push once  dextrose 50% Injectable 25 Gram(s) IV Push once  gabapentin 100 milliGRAM(s) Oral three times a day  glucagon  Injectable 1 milliGRAM(s) IntraMuscular once  influenza   Vaccine 0.5 milliLiter(s) IntraMuscular once  insulin lispro (ADMELOG) corrective regimen sliding scale   SubCutaneous three times a day before meals  melatonin 3 milliGRAM(s) Oral at bedtime PRN  metolazone 5 milliGRAM(s) Oral once  metoprolol succinate  milliGRAM(s) Oral daily  milrinone Infusion 0.25 MICROgram(s)/kG/Min IV Continuous <Continuous>  ondansetron Injectable 4 milliGRAM(s) IV Push every 8 hours PRN  spironolactone 25 milliGRAM(s) Oral daily    Vitals:  T(C): 36.7 (01-10-22 @ 08:00), Max: 36.9 (22 @ 21:20)  HR: 81 (01-10-22 @ 08:00) (78 - 82)  BP: 115/80 (01-10-22 @ 08:00) (109/75 - 116/72)  BP(mean): 87 (22 @ 15:50) (87 - 87)  RR: 17 (01-10-22 @ 08:00) (17 - 18)  SpO2: 97% (01-10-22 @ 08:00) (92% - 98%)    Daily     Daily Weight in k.2 (10 David 2022 05:20)        I&O's Summary    2022 07:01  -  10 2022 07:00  --------------------------------------------------------  IN: 669.4 mL / OUT: 1900 mL / NET: -1230.6 mL        Physical Exam:  Appearance: No Acute Distress  HEENT: JVP ~9cm  Cardiovascular: RRR, Normal S1 S2, No murmurs/rubs/gallops  Respiratory: decreased BS R>L  Gastrointestinal: Soft, Non-tender, non-distended	  Skin: no skin lesions  Neurologic: Non-focal  Extremities: +1 LE edema up to mid thigh, lukewarm extremities  Psychiatry: A & O x 3, Mood & affect appropriate      Labs:                        13.9   6.28  )-----------( 233      ( 10 David 2022 06:15 )             43.7     01-10    137  |  96<L>  |  38.0<H>  ----------------------------<  96  4.0   |  26.0  |  1.21    Ca    8.7      10 David 2022 06:15  Mg     2.0     01-10    TPro  7.4  /  Alb  3.8  /  TBili  1.0  /  DBili  x   /  AST  52<H>  /  ALT  45<H>  /  AlkPhos  346<H>  01-10

## 2022-01-11 LAB
ALBUMIN SERPL ELPH-MCNC: 3.7 G/DL — SIGNIFICANT CHANGE UP (ref 3.3–5.2)
ALP SERPL-CCNC: 334 U/L — HIGH (ref 40–120)
ALT FLD-CCNC: 41 U/L — HIGH
ANION GAP SERPL CALC-SCNC: 13 MMOL/L — SIGNIFICANT CHANGE UP (ref 5–17)
ANION GAP SERPL CALC-SCNC: 18 MMOL/L — HIGH (ref 5–17)
AST SERPL-CCNC: 48 U/L — HIGH
BILIRUB SERPL-MCNC: 1 MG/DL — SIGNIFICANT CHANGE UP (ref 0.4–2)
BUN SERPL-MCNC: 45.9 MG/DL — HIGH (ref 8–20)
BUN SERPL-MCNC: 56.5 MG/DL — HIGH (ref 8–20)
CALCIUM SERPL-MCNC: 8.8 MG/DL — SIGNIFICANT CHANGE UP (ref 8.6–10.2)
CALCIUM SERPL-MCNC: 9.2 MG/DL — SIGNIFICANT CHANGE UP (ref 8.6–10.2)
CHLORIDE SERPL-SCNC: 90 MMOL/L — LOW (ref 98–107)
CHLORIDE SERPL-SCNC: 93 MMOL/L — LOW (ref 98–107)
CO2 SERPL-SCNC: 25 MMOL/L — SIGNIFICANT CHANGE UP (ref 22–29)
CO2 SERPL-SCNC: 29 MMOL/L — SIGNIFICANT CHANGE UP (ref 22–29)
CREAT SERPL-MCNC: 1.38 MG/DL — HIGH (ref 0.5–1.3)
CREAT SERPL-MCNC: 1.42 MG/DL — HIGH (ref 0.5–1.3)
GLUCOSE BLDC GLUCOMTR-MCNC: 113 MG/DL — HIGH (ref 70–99)
GLUCOSE BLDC GLUCOMTR-MCNC: 117 MG/DL — HIGH (ref 70–99)
GLUCOSE BLDC GLUCOMTR-MCNC: 129 MG/DL — HIGH (ref 70–99)
GLUCOSE BLDC GLUCOMTR-MCNC: 181 MG/DL — HIGH (ref 70–99)
GLUCOSE SERPL-MCNC: 122 MG/DL — HIGH (ref 70–99)
GLUCOSE SERPL-MCNC: 145 MG/DL — HIGH (ref 70–99)
HCT VFR BLD CALC: 42.5 % — SIGNIFICANT CHANGE UP (ref 39–50)
HGB BLD-MCNC: 13.2 G/DL — SIGNIFICANT CHANGE UP (ref 13–17)
MAGNESIUM SERPL-MCNC: 2.1 MG/DL — SIGNIFICANT CHANGE UP (ref 1.6–2.6)
MAGNESIUM SERPL-MCNC: 2.1 MG/DL — SIGNIFICANT CHANGE UP (ref 1.6–2.6)
MCHC RBC-ENTMCNC: 27.2 PG — SIGNIFICANT CHANGE UP (ref 27–34)
MCHC RBC-ENTMCNC: 31.1 GM/DL — LOW (ref 32–36)
MCV RBC AUTO: 87.4 FL — SIGNIFICANT CHANGE UP (ref 80–100)
PLATELET # BLD AUTO: 232 K/UL — SIGNIFICANT CHANGE UP (ref 150–400)
POTASSIUM SERPL-MCNC: 3.8 MMOL/L — SIGNIFICANT CHANGE UP (ref 3.5–5.3)
POTASSIUM SERPL-MCNC: 4.2 MMOL/L — SIGNIFICANT CHANGE UP (ref 3.5–5.3)
POTASSIUM SERPL-SCNC: 3.8 MMOL/L — SIGNIFICANT CHANGE UP (ref 3.5–5.3)
POTASSIUM SERPL-SCNC: 4.2 MMOL/L — SIGNIFICANT CHANGE UP (ref 3.5–5.3)
PROT SERPL-MCNC: 6.9 G/DL — SIGNIFICANT CHANGE UP (ref 6.6–8.7)
RBC # BLD: 4.86 M/UL — SIGNIFICANT CHANGE UP (ref 4.2–5.8)
RBC # FLD: 16.5 % — HIGH (ref 10.3–14.5)
SODIUM SERPL-SCNC: 133 MMOL/L — LOW (ref 135–145)
SODIUM SERPL-SCNC: 134 MMOL/L — LOW (ref 135–145)
WBC # BLD: 6.01 K/UL — SIGNIFICANT CHANGE UP (ref 3.8–10.5)
WBC # FLD AUTO: 6.01 K/UL — SIGNIFICANT CHANGE UP (ref 3.8–10.5)

## 2022-01-11 PROCEDURE — 99233 SBSQ HOSP IP/OBS HIGH 50: CPT

## 2022-01-11 RX ORDER — POTASSIUM CHLORIDE 20 MEQ
20 PACKET (EA) ORAL ONCE
Refills: 0 | Status: COMPLETED | OUTPATIENT
Start: 2022-01-11 | End: 2022-01-11

## 2022-01-11 RX ADMIN — AMIODARONE HYDROCHLORIDE 200 MILLIGRAM(S): 400 TABLET ORAL at 05:48

## 2022-01-11 RX ADMIN — Medication 20 MILLIEQUIVALENT(S): at 09:24

## 2022-01-11 RX ADMIN — MILRINONE LACTATE 5.63 MICROGRAM(S)/KG/MIN: 1 INJECTION, SOLUTION INTRAVENOUS at 16:35

## 2022-01-11 RX ADMIN — BUMETANIDE 10 MG/HR: 0.25 INJECTION INTRAMUSCULAR; INTRAVENOUS at 17:55

## 2022-01-11 RX ADMIN — SPIRONOLACTONE 25 MILLIGRAM(S): 25 TABLET, FILM COATED ORAL at 05:48

## 2022-01-11 RX ADMIN — APIXABAN 5 MILLIGRAM(S): 2.5 TABLET, FILM COATED ORAL at 17:55

## 2022-01-11 RX ADMIN — Medication 1 TABLET(S): at 09:24

## 2022-01-11 RX ADMIN — Medication 100 MILLIGRAM(S): at 05:48

## 2022-01-11 RX ADMIN — Medication 81 MILLIGRAM(S): at 09:24

## 2022-01-11 RX ADMIN — ATORVASTATIN CALCIUM 40 MILLIGRAM(S): 80 TABLET, FILM COATED ORAL at 21:03

## 2022-01-11 RX ADMIN — GABAPENTIN 100 MILLIGRAM(S): 400 CAPSULE ORAL at 13:56

## 2022-01-11 RX ADMIN — APIXABAN 5 MILLIGRAM(S): 2.5 TABLET, FILM COATED ORAL at 05:48

## 2022-01-11 RX ADMIN — GABAPENTIN 100 MILLIGRAM(S): 400 CAPSULE ORAL at 05:48

## 2022-01-11 RX ADMIN — GABAPENTIN 100 MILLIGRAM(S): 400 CAPSULE ORAL at 21:03

## 2022-01-11 NOTE — PROGRESS NOTE ADULT - ASSESSMENT
61 yr old male with CAD, dilated cardiomyopathy, s/p ICD, atrial flutter on Eliquis, substance abuse was admitted initially from 12/24 for decompensated CHF, was on Milrinone and Bumex drips, left AMA on 12/31 and returned to the ED later in the day as he had to take care of personal matter. He reported shortness of breath and right arm and foot pain on presentation. Cardiology was consulted, he was placed on oral Bumex., subsequently transitioned to Bumex gtt. Patient reported he was recently evicted from his apartment and is homeless, social work consult placed.    Decompensated HFrEF s/p ICD:  Continue Bumex and Milrinone at current rates  Continue Aldactone.  daily weight and strict I/O  Continue statin, metoprolol  Supplemental oxygen  monitor lytes and renal function  continue telemetry  Not candidate for advanced therapies due to smoking/cocaine use history and non-compliance.  Wrap legs with ACE wraps    Atrial flutter:  Continue Amiodarone,  Metoprolol, Eliquis.    Diabetes mellitus:  Sliding scale coverage, monitor fingersticks.    DERIRCK on CKD:  increase in cr  but has been making Urine.   Monitor renal function on Aldactone and Lasix.    DVT prophylaxis: On Eliquis.  Dispo: patient remains medically acute on IV Bumex and Milrinone.  Discussed with HF team.

## 2022-01-11 NOTE — PROGRESS NOTE ADULT - SUBJECTIVE AND OBJECTIVE BOX
Subjective:  INCOMPLETE NOTE    Medications:  acetaminophen     Tablet .. 650 milliGRAM(s) Oral every 6 hours PRN  aluminum hydroxide/magnesium hydroxide/simethicone Suspension 30 milliLiter(s) Oral every 4 hours PRN  aMIOdarone    Tablet 200 milliGRAM(s) Oral daily  apixaban 5 milliGRAM(s) Oral every 12 hours  aspirin enteric coated 81 milliGRAM(s) Oral daily  atorvastatin 40 milliGRAM(s) Oral at bedtime  buMETAnide Infusion 2 mG/Hr IV Continuous <Continuous>  dextrose 40% Gel 15 Gram(s) Oral once  dextrose 5%. 1000 milliLiter(s) IV Continuous <Continuous>  dextrose 5%. 1000 milliLiter(s) IV Continuous <Continuous>  dextrose 50% Injectable 25 Gram(s) IV Push once  dextrose 50% Injectable 12.5 Gram(s) IV Push once  dextrose 50% Injectable 25 Gram(s) IV Push once  gabapentin 100 milliGRAM(s) Oral three times a day  glucagon  Injectable 1 milliGRAM(s) IntraMuscular once  influenza   Vaccine 0.5 milliLiter(s) IntraMuscular once  insulin lispro (ADMELOG) corrective regimen sliding scale   SubCutaneous three times a day before meals  melatonin 3 milliGRAM(s) Oral at bedtime PRN  metoprolol succinate  milliGRAM(s) Oral daily  milrinone Infusion 0.25 MICROgram(s)/kG/Min IV Continuous <Continuous>  multivitamin 1 Tablet(s) Oral daily  ondansetron Injectable 4 milliGRAM(s) IV Push every 8 hours PRN  spironolactone 25 milliGRAM(s) Oral daily    Vitals:  T(C): 36.6 (22 @ 05:22), Max: 36.7 (01-10-22 @ 20:18)  HR: 76 (22 @ 09:34) (76 - 86)  BP: 113/74 (22 @ 09:34) (109/74 - 117/81)  BP(mean): --  RR: 18 (22 @ 09:34) (18 - 18)  SpO2: 99% (22 @ 09:34) (99% - 100%)    Daily     Daily Weight in k.2 (2022 05:05)        I&O's Summary    10 David 2022 07:01  -  2022 07:00  --------------------------------------------------------  IN: 745.2 mL / OUT: 2125 mL / NET: -1379.8 mL        Physical Exam:  Appearance: No Acute Distress  HEENT: JVP non elevated  Cardiovascular: RRR, Normal S1 S2, No murmurs/rubs/gallops  Respiratory: Clear to auscultation bilaterally  Gastrointestinal: Soft, Non-tender, non-distended	  Skin: no skin lesions  Neurologic: Non-focal  Extremities: No LE edema, warm and well perfused  Psychiatry: A & O x 3, Mood & affect appropriate      Labs:                        13.2   6.01  )-----------( 232      ( 2022 06:11 )             42.5     -    134<L>  |  93<L>  |  45.9<H>  ----------------------------<  122<H>  3.8   |  29.0  |  1.42<H>    Ca    8.8      2022 06:11  Mg     2.1         TPro  6.9  /  Alb  3.7  /  TBili  1.0  /  DBili  x   /  AST  48<H>  /  ALT  41<H>  /  AlkPhos  334<H>                       Subjective:  Feels well. No acute events overnight.     Medications:  acetaminophen     Tablet .. 650 milliGRAM(s) Oral every 6 hours PRN  aluminum hydroxide/magnesium hydroxide/simethicone Suspension 30 milliLiter(s) Oral every 4 hours PRN  aMIOdarone    Tablet 200 milliGRAM(s) Oral daily  apixaban 5 milliGRAM(s) Oral every 12 hours  aspirin enteric coated 81 milliGRAM(s) Oral daily  atorvastatin 40 milliGRAM(s) Oral at bedtime  buMETAnide Infusion 2 mG/Hr IV Continuous <Continuous>  dextrose 40% Gel 15 Gram(s) Oral once  dextrose 5%. 1000 milliLiter(s) IV Continuous <Continuous>  dextrose 5%. 1000 milliLiter(s) IV Continuous <Continuous>  dextrose 50% Injectable 25 Gram(s) IV Push once  dextrose 50% Injectable 12.5 Gram(s) IV Push once  dextrose 50% Injectable 25 Gram(s) IV Push once  gabapentin 100 milliGRAM(s) Oral three times a day  glucagon  Injectable 1 milliGRAM(s) IntraMuscular once  influenza   Vaccine 0.5 milliLiter(s) IntraMuscular once  insulin lispro (ADMELOG) corrective regimen sliding scale   SubCutaneous three times a day before meals  melatonin 3 milliGRAM(s) Oral at bedtime PRN  metoprolol succinate  milliGRAM(s) Oral daily  milrinone Infusion 0.25 MICROgram(s)/kG/Min IV Continuous <Continuous>  multivitamin 1 Tablet(s) Oral daily  ondansetron Injectable 4 milliGRAM(s) IV Push every 8 hours PRN  spironolactone 25 milliGRAM(s) Oral daily    Vitals:  T(C): 36.6 (22 @ 05:22), Max: 36.7 (01-10-22 @ 20:18)  HR: 76 (22 @ 09:34) (76 - 86)  BP: 113/74 (22 @ 09:34) (109/74 - 117/81)  BP(mean): --  RR: 18 (22 @ 09:34) (18 - 18)  SpO2: 99% (22 @ 09:34) (99% - 100%)    Daily     Daily Weight in k.2 (2022 05:05)        I&O's Summary    10 David 2022 07:01  -  2022 07:00  --------------------------------------------------------  IN: 745.2 mL / OUT: 2125 mL / NET: -1379.8 mL        Physical Exam:  Appearance: No Acute Distress  HEENT: JVP 9cm  Cardiovascular: RRR, Normal S1 S2, No murmurs/rubs/gallops  Respiratory: Clear to auscultation bilaterally  Gastrointestinal: Soft, Non-tender, non-distended	  Skin: no skin lesions  Neurologic: Non-focal  Extremities: b/l LE edema minimal change from yesterday, [place] extremities  Psychiatry: A & O x 3, Mood & affect appropriate      Labs:                        13.2   6.01  )-----------( 232      ( 2022 06:11 )             42.5         134<L>  |  93<L>  |  45.9<H>  ----------------------------<  122<H>  3.8   |  29.0  |  1.42<H>    Ca    8.8      2022 06:11  Mg     2.1         TPro  6.9  /  Alb  3.7  /  TBili  1.0  /  DBili  x   /  AST  48<H>  /  ALT  41<H>  /  AlkPhos  334<H>

## 2022-01-11 NOTE — PHYSICAL THERAPY INITIAL EVALUATION ADULT - ADDITIONAL COMMENTS
Pt reports that he was living in social security housing/shelter. States that he can no longer return there. Is currently homeless. Pt independent at baseline. States he was able to negotiate 5 steps with rail. Denies owning any DME.

## 2022-01-11 NOTE — PROGRESS NOTE ADULT - SUBJECTIVE AND OBJECTIVE BOX
[facility] Division of Hospital Medicine    Chief Complaint:  CHF    SUBJECTIVE / OVERNIGHT EVENTS: Patient seen and examined. Reports he is urinating a lot. No chest pain and no shortness of breath.     Patient denies chest pain, SOB, abd pain, N/V, fever, chills, dysuria or any other complaints. All remainder ROS negative.     MEDICATIONS  (STANDING):  aMIOdarone    Tablet 200 milliGRAM(s) Oral daily  apixaban 5 milliGRAM(s) Oral every 12 hours  aspirin enteric coated 81 milliGRAM(s) Oral daily  atorvastatin 40 milliGRAM(s) Oral at bedtime  buMETAnide Infusion 2 mG/Hr (10 mL/Hr) IV Continuous <Continuous>  dextrose 40% Gel 15 Gram(s) Oral once  dextrose 5%. 1000 milliLiter(s) (50 mL/Hr) IV Continuous <Continuous>  dextrose 5%. 1000 milliLiter(s) (100 mL/Hr) IV Continuous <Continuous>  dextrose 50% Injectable 25 Gram(s) IV Push once  dextrose 50% Injectable 12.5 Gram(s) IV Push once  dextrose 50% Injectable 25 Gram(s) IV Push once  gabapentin 100 milliGRAM(s) Oral three times a day  glucagon  Injectable 1 milliGRAM(s) IntraMuscular once  influenza   Vaccine 0.5 milliLiter(s) IntraMuscular once  insulin lispro (ADMELOG) corrective regimen sliding scale   SubCutaneous three times a day before meals  metoprolol succinate  milliGRAM(s) Oral daily  milrinone Infusion 0.25 MICROgram(s)/kG/Min (5.63 mL/Hr) IV Continuous <Continuous>  multivitamin 1 Tablet(s) Oral daily  spironolactone 25 milliGRAM(s) Oral daily    MEDICATIONS  (PRN):  acetaminophen     Tablet .. 650 milliGRAM(s) Oral every 6 hours PRN Temp greater or equal to 38C (100.4F), Mild Pain (1 - 3)  aluminum hydroxide/magnesium hydroxide/simethicone Suspension 30 milliLiter(s) Oral every 4 hours PRN Dyspepsia  melatonin 3 milliGRAM(s) Oral at bedtime PRN Insomnia  ondansetron Injectable 4 milliGRAM(s) IV Push every 8 hours PRN Nausea and/or Vomiting        I&O's Summary    10 David 2022 07:01  -  11 Jan 2022 07:00  --------------------------------------------------------  IN: 745.2 mL / OUT: 2125 mL / NET: -1379.8 mL        PHYSICAL EXAM:  Vital Signs Last 24 Hrs  T(C): 36.6 (11 Jan 2022 05:22), Max: 36.7 (10 David 2022 20:18)  T(F): 97.9 (11 Jan 2022 05:22), Max: 98 (10 David 2022 20:18)  HR: 76 (11 Jan 2022 09:34) (76 - 86)  BP: 113/74 (11 Jan 2022 09:34) (109/74 - 117/81)  BP(mean): --  RR: 18 (11 Jan 2022 09:34) (18 - 18)  SpO2: 99% (11 Jan 2022 09:34) (99% - 100%)    CONSTITUTIONAL: NAD  ENMT: Moist oral mucosa, no pharyngeal injection or exudates  RESPIRATORY: Normal respiratory effort; lungs are clear to auscultation bilaterally  CARDIOVASCULAR: Regular rate and rhythm, normal S1 and S2, + lower extremity edema;  ABDOMEN: Nontender to palpation, normoactive bowel sounds, no rebound/guarding;   MUSCLOSKELETAL:  no clubbing or cyanosis of digits; no joint swelling or tenderness to palpation  PSYCH: A+O to person, place, and time; affect appropriate  NEUROLOGY: CN 2-12 are intact and symmetric; no gross sensory deficits;   SKIN: No rashes; no palpable lesions     LABS:                        13.2   6.01  )-----------( 232      ( 11 Jan 2022 06:11 )             42.5     01-11    134<L>  |  93<L>  |  45.9<H>  ----------------------------<  122<H>  3.8   |  29.0  |  1.42<H>    Ca    8.8      11 Jan 2022 06:11  Mg     2.1     01-11    TPro  6.9  /  Alb  3.7  /  TBili  1.0  /  DBili  x   /  AST  48<H>  /  ALT  41<H>  /  AlkPhos  334<H>  01-11              CAPILLARY BLOOD GLUCOSE      POCT Blood Glucose.: 117 mg/dL (11 Jan 2022 09:17)  POCT Blood Glucose.: 136 mg/dL (10 David 2022 21:45)  POCT Blood Glucose.: 94 mg/dL (10 David 2022 17:18)  POCT Blood Glucose.: 126 mg/dL (10 David 2022 12:27)        RADIOLOGY & ADDITIONAL TESTS:  Results Reviewed:   Imaging Personally Reviewed:  Electrocardiogram Personally Reviewed:                                           [facility] Division of Hospital Medicine    Chief Complaint:  CHF    SUBJECTIVE / OVERNIGHT EVENTS: Patient seen and examined. Reports he is urinating a lot. No chest pain and no shortness of breath.     Patient denies chest pain, SOB, abd pain, N/V, fever, chills, dysuria or any other complaints. All remainder ROS negative.     MEDICATIONS  (STANDING):  aMIOdarone    Tablet 200 milliGRAM(s) Oral daily  apixaban 5 milliGRAM(s) Oral every 12 hours  aspirin enteric coated 81 milliGRAM(s) Oral daily  atorvastatin 40 milliGRAM(s) Oral at bedtime  buMETAnide Infusion 2 mG/Hr (10 mL/Hr) IV Continuous <Continuous>  dextrose 40% Gel 15 Gram(s) Oral once  dextrose 5%. 1000 milliLiter(s) (50 mL/Hr) IV Continuous <Continuous>  dextrose 5%. 1000 milliLiter(s) (100 mL/Hr) IV Continuous <Continuous>  dextrose 50% Injectable 25 Gram(s) IV Push once  dextrose 50% Injectable 12.5 Gram(s) IV Push once  dextrose 50% Injectable 25 Gram(s) IV Push once  gabapentin 100 milliGRAM(s) Oral three times a day  glucagon  Injectable 1 milliGRAM(s) IntraMuscular once  influenza   Vaccine 0.5 milliLiter(s) IntraMuscular once  insulin lispro (ADMELOG) corrective regimen sliding scale   SubCutaneous three times a day before meals  metoprolol succinate  milliGRAM(s) Oral daily  milrinone Infusion 0.25 MICROgram(s)/kG/Min (5.63 mL/Hr) IV Continuous <Continuous>  multivitamin 1 Tablet(s) Oral daily  spironolactone 25 milliGRAM(s) Oral daily    MEDICATIONS  (PRN):  acetaminophen     Tablet .. 650 milliGRAM(s) Oral every 6 hours PRN Temp greater or equal to 38C (100.4F), Mild Pain (1 - 3)  aluminum hydroxide/magnesium hydroxide/simethicone Suspension 30 milliLiter(s) Oral every 4 hours PRN Dyspepsia  melatonin 3 milliGRAM(s) Oral at bedtime PRN Insomnia  ondansetron Injectable 4 milliGRAM(s) IV Push every 8 hours PRN Nausea and/or Vomiting        I&O's Summary    10 David 2022 07:01  -  11 Jan 2022 07:00  --------------------------------------------------------  IN: 745.2 mL / OUT: 2125 mL / NET: -1379.8 mL        PHYSICAL EXAM:  Vital Signs Last 24 Hrs  T(C): 36.6 (11 Jan 2022 05:22), Max: 36.7 (10 David 2022 20:18)  T(F): 97.9 (11 Jan 2022 05:22), Max: 98 (10 David 2022 20:18)  HR: 76 (11 Jan 2022 09:34) (76 - 86)  BP: 113/74 (11 Jan 2022 09:34) (109/74 - 117/81)  BP(mean): --  RR: 18 (11 Jan 2022 09:34) (18 - 18)  SpO2: 99% (11 Jan 2022 09:34) (99% - 100%)    CONSTITUTIONAL: NAD  ENMT: Moist oral mucosa, no pharyngeal injection or exudates  RESPIRATORY: Normal respiratory effort; lungs are clear to auscultation bilaterally  CARDIOVASCULAR: Regular rate and rhythm, normal S1 and S2, + lower extremity edema; legs wrapped in ace bandages  ABDOMEN: Nontender to palpation, normoactive bowel sounds, no rebound/guarding;   MUSCLOSKELETAL:  no clubbing or cyanosis of digits; no joint swelling or tenderness to palpation  PSYCH: A+O to person, place, and time; affect appropriate  NEUROLOGY: CN 2-12 are intact and symmetric; no gross sensory deficits;   SKIN: No rashes; no palpable lesions     LABS:                        13.2   6.01  )-----------( 232      ( 11 Jan 2022 06:11 )             42.5     01-11    134<L>  |  93<L>  |  45.9<H>  ----------------------------<  122<H>  3.8   |  29.0  |  1.42<H>    Ca    8.8      11 Jan 2022 06:11  Mg     2.1     01-11    TPro  6.9  /  Alb  3.7  /  TBili  1.0  /  DBili  x   /  AST  48<H>  /  ALT  41<H>  /  AlkPhos  334<H>  01-11              CAPILLARY BLOOD GLUCOSE      POCT Blood Glucose.: 117 mg/dL (11 Jan 2022 09:17)  POCT Blood Glucose.: 136 mg/dL (10 David 2022 21:45)  POCT Blood Glucose.: 94 mg/dL (10 David 2022 17:18)  POCT Blood Glucose.: 126 mg/dL (10 David 2022 12:27)        RADIOLOGY & ADDITIONAL TESTS:  Results Reviewed:   Imaging Personally Reviewed:  Electrocardiogram Personally Reviewed:

## 2022-01-11 NOTE — PHYSICAL THERAPY INITIAL EVALUATION ADULT - ACTIVE RANGE OF MOTION EXAMINATION, REHAB EVAL
vamshi. upper extremity Active ROM was WNL (within normal limits)/bilateral  lower extremity Active ROM was WFL (within functional limits)

## 2022-01-11 NOTE — PHYSICAL THERAPY INITIAL EVALUATION ADULT - GENERAL OBSERVATIONS, REHAB EVAL
Pt received sitting at edge of bed on 4 tower, (+) IV left UE, (+) cardiac monitor, (+) ACE wrap right LE, NAD, Agreeable to PT with encouragement.

## 2022-01-11 NOTE — PHYSICAL THERAPY INITIAL EVALUATION ADULT - PERTINENT HX OF CURRENT PROBLEM, REHAB EVAL
62 y/o male admitted with c/o right LE and UE pain, SOB. Found to have acute on chronic CHF (Pt initially presented on 12/24, left AMA 12/31 due to personal issues). Has h/o ETOH and substance abuse. Currently homeless.  CT LE (-), LE dopplers (-).

## 2022-01-11 NOTE — PROGRESS NOTE ADULT - PROBLEM SELECTOR PLAN 1
ACC/AHA stage C, NICMP (LVEF <20%, LVIDd 6.96cm)  Clinically hypervolemic, cool extremities.    Inotropic support: Continue Milrinone gtt 0.250 mcg/kg/min  Monitor markers of perfusion daily including lactate, LFTs  Diuretics: Bumex gtt 2 mg/hr. PLease give Metolazone 5mg PO x 1.   Please check BMP q12 hrs while on gtt. Maintain K+ >4.0 and Mg >2.0.  1.5L Fluid restriction, strict I&O and daily STANDING weights.  GDMT: Continue Toprol-XL 100mg daily and aldactone 25mg daily. Plan to optimize w/ ACEi/ARB/ARNi once euvolemic. SGLT2i as outpt.  Device: s/p ICD   Not candidate for advanced therapies due to smoking/cocaine use history and non-compliance.  Please obtain CxR today ACC/AHA stage C, NICMP (LVEF <20%, LVIDd 6.96cm)  Clinically hypervolemic, cool extremities.    Inotropic support: Continue Milrinone gtt 0.250 mcg/kg/min  Monitor markers of perfusion daily including lactate, LFTs  Diuretics: Bumex gtt 2 mg/hr  Please check BMP q12 hrs while on gtt. Maintain K+ >4.0 and Mg >2.0.  1.5L Fluid restriction, strict I&O and daily STANDING weights.  GDMT: Continue Toprol-XL 100mg daily and aldactone 25mg daily. Plan to optimize w/ ACEi/ARB/ARNi once euvolemic. SGLT2i as outpt.  Device: s/p ICD   Not candidate for advanced therapies due to smoking/cocaine use history and non-compliance.

## 2022-01-11 NOTE — PROGRESS NOTE ADULT - ASSESSMENT
62 y/o with h/o chronic systolic HF ACC/AHA stage C, NICMP LVEF <20% LVIDd 6.96cm, nonobstructive CAD, s/p ICD, HTN, active tobacco use, ?COPD who presented with decompensated HF. He reports being compliant with medications but not so much with low Na diet due to living in a group home.   Of note, the patient was recently admitted for acute decompensated HF in the setting of aflutter w/ RVR. He underwent successful VANESSA/DCCV on 11/22/21. This is his 4th hospitalization in the past year. He was started on bumex/milrinone gtt with good response.     Cards: Alison ([facility])    Pertinent Cardiac Studies  11/18/21 EKG Aflutter LAD. PRWP. NS T wave changes    11/20/21 LVEF <20% LVIDd 6.96cm VTI 5cm, RV severely enlarged with severely reduced systolic HF, sev biatrial enlargement, mod-sev MR, moderate TR, RVSP 38mmHg    11/22/21 VANESSA limited study, LVEF <20%, no FADUMO thrombus    2016 [facility] LM minor irreg, mid LAD 50%, LCx minor irreg, RCA prox 60% mid 85%

## 2022-01-12 LAB
ANION GAP SERPL CALC-SCNC: 18 MMOL/L — HIGH (ref 5–17)
BUN SERPL-MCNC: 60.6 MG/DL — HIGH (ref 8–20)
CALCIUM SERPL-MCNC: 9.3 MG/DL — SIGNIFICANT CHANGE UP (ref 8.6–10.2)
CHLORIDE SERPL-SCNC: 86 MMOL/L — LOW (ref 98–107)
CO2 SERPL-SCNC: 28 MMOL/L — SIGNIFICANT CHANGE UP (ref 22–29)
CREAT SERPL-MCNC: 1.46 MG/DL — HIGH (ref 0.5–1.3)
GLUCOSE BLDC GLUCOMTR-MCNC: 100 MG/DL — HIGH (ref 70–99)
GLUCOSE BLDC GLUCOMTR-MCNC: 114 MG/DL — HIGH (ref 70–99)
GLUCOSE BLDC GLUCOMTR-MCNC: 116 MG/DL — HIGH (ref 70–99)
GLUCOSE BLDC GLUCOMTR-MCNC: 126 MG/DL — HIGH (ref 70–99)
GLUCOSE SERPL-MCNC: 102 MG/DL — HIGH (ref 70–99)
HCT VFR BLD CALC: 43.6 % — SIGNIFICANT CHANGE UP (ref 39–50)
HGB BLD-MCNC: 13.8 G/DL — SIGNIFICANT CHANGE UP (ref 13–17)
MAGNESIUM SERPL-MCNC: 2.3 MG/DL — SIGNIFICANT CHANGE UP (ref 1.8–2.6)
MCHC RBC-ENTMCNC: 26.8 PG — LOW (ref 27–34)
MCHC RBC-ENTMCNC: 31.7 GM/DL — LOW (ref 32–36)
MCV RBC AUTO: 84.8 FL — SIGNIFICANT CHANGE UP (ref 80–100)
PLATELET # BLD AUTO: 253 K/UL — SIGNIFICANT CHANGE UP (ref 150–400)
POTASSIUM SERPL-MCNC: 4.2 MMOL/L — SIGNIFICANT CHANGE UP (ref 3.5–5.3)
POTASSIUM SERPL-SCNC: 4.2 MMOL/L — SIGNIFICANT CHANGE UP (ref 3.5–5.3)
RBC # BLD: 5.14 M/UL — SIGNIFICANT CHANGE UP (ref 4.2–5.8)
RBC # FLD: 16.7 % — HIGH (ref 10.3–14.5)
SODIUM SERPL-SCNC: 132 MMOL/L — LOW (ref 135–145)
WBC # BLD: 6.1 K/UL — SIGNIFICANT CHANGE UP (ref 3.8–10.5)
WBC # FLD AUTO: 6.1 K/UL — SIGNIFICANT CHANGE UP (ref 3.8–10.5)

## 2022-01-12 PROCEDURE — 99232 SBSQ HOSP IP/OBS MODERATE 35: CPT

## 2022-01-12 PROCEDURE — 99233 SBSQ HOSP IP/OBS HIGH 50: CPT

## 2022-01-12 RX ORDER — BUMETANIDE 0.25 MG/ML
2 INJECTION INTRAMUSCULAR; INTRAVENOUS
Qty: 20 | Refills: 0 | Status: DISCONTINUED | OUTPATIENT
Start: 2022-01-12 | End: 2022-01-12

## 2022-01-12 RX ADMIN — SPIRONOLACTONE 25 MILLIGRAM(S): 25 TABLET, FILM COATED ORAL at 05:17

## 2022-01-12 RX ADMIN — Medication 3 MILLIGRAM(S): at 22:19

## 2022-01-12 RX ADMIN — APIXABAN 5 MILLIGRAM(S): 2.5 TABLET, FILM COATED ORAL at 05:17

## 2022-01-12 RX ADMIN — GABAPENTIN 100 MILLIGRAM(S): 400 CAPSULE ORAL at 05:17

## 2022-01-12 RX ADMIN — GABAPENTIN 100 MILLIGRAM(S): 400 CAPSULE ORAL at 14:35

## 2022-01-12 RX ADMIN — Medication 1 TABLET(S): at 08:42

## 2022-01-12 RX ADMIN — GABAPENTIN 100 MILLIGRAM(S): 400 CAPSULE ORAL at 22:19

## 2022-01-12 RX ADMIN — AMIODARONE HYDROCHLORIDE 200 MILLIGRAM(S): 400 TABLET ORAL at 05:17

## 2022-01-12 RX ADMIN — APIXABAN 5 MILLIGRAM(S): 2.5 TABLET, FILM COATED ORAL at 17:17

## 2022-01-12 RX ADMIN — BUMETANIDE 10 MG/HR: 0.25 INJECTION INTRAMUSCULAR; INTRAVENOUS at 18:01

## 2022-01-12 RX ADMIN — MILRINONE LACTATE 5.63 MICROGRAM(S)/KG/MIN: 1 INJECTION, SOLUTION INTRAVENOUS at 08:43

## 2022-01-12 RX ADMIN — Medication 100 MILLIGRAM(S): at 05:17

## 2022-01-12 RX ADMIN — BUMETANIDE 10 MG/HR: 0.25 INJECTION INTRAMUSCULAR; INTRAVENOUS at 08:47

## 2022-01-12 RX ADMIN — Medication 81 MILLIGRAM(S): at 08:42

## 2022-01-12 RX ADMIN — ATORVASTATIN CALCIUM 40 MILLIGRAM(S): 80 TABLET, FILM COATED ORAL at 22:20

## 2022-01-12 NOTE — PROGRESS NOTE ADULT - PROBLEM SELECTOR PLAN 1
ACC/AHA stage C, NICMP (LVEF <20%, LVIDd 6.96cm)  Clinically hypervolemic, cool extremities.    Inotropic support: Continue Milrinone gtt 0.250 mcg/kg/min  Monitor markers of perfusion daily including lactate, LFTs  Diuretics: Bumex gtt 2 mg/hr  Please check BMP q12 hrs while on gtt. Maintain K+ >4.0 and Mg >2.0.  1.5L Fluid restriction, strict I&O and daily STANDING weights.  GDMT: Continue Toprol-XL 100mg daily and aldactone 25mg daily. Plan to optimize w/ ACEi/ARB/ARNi once euvolemic. SGLT2i as outpt.  Device: s/p ICD   Not candidate for advanced therapies due to smoking/cocaine use history and non-compliance. ACC/AHA stage C, NICMP (LVEF <20%, LVIDd 6.96cm)  Clinically hypervolemic, cool extremities.    Inotropic support: Continue Milrinone gtt 0.250 mcg/kg/min  Monitor markers of perfusion daily including lactate, LFTs  Diuretics: Bumex gtt 2 mg/hr until 6pm. PLan to start Torsemide 60mg PO BID tomorrow.  Please check BMP q12 hrs while on gtt. Maintain K+ >4.0 and Mg >2.0.  1.5L Fluid restriction, strict I&O and daily STANDING weights.  GDMT: Continue Toprol-XL 100mg daily and aldactone 25mg daily. Plan to optimize w/ ACEi/ARB/ARNi once euvolemic. SGLT2i as outpt.  Device: s/p ICD   Not candidate for advanced therapies due to smoking/cocaine use history and non-compliance.

## 2022-01-12 NOTE — PROGRESS NOTE ADULT - SUBJECTIVE AND OBJECTIVE BOX
Subjective:  INCOMPLETE NOTE    Medications:  acetaminophen     Tablet .. 650 milliGRAM(s) Oral every 6 hours PRN  aluminum hydroxide/magnesium hydroxide/simethicone Suspension 30 milliLiter(s) Oral every 4 hours PRN  aMIOdarone    Tablet 200 milliGRAM(s) Oral daily  apixaban 5 milliGRAM(s) Oral every 12 hours  aspirin enteric coated 81 milliGRAM(s) Oral daily  atorvastatin 40 milliGRAM(s) Oral at bedtime  buMETAnide Infusion 2 mG/Hr IV Continuous <Continuous>  dextrose 40% Gel 15 Gram(s) Oral once  dextrose 5%. 1000 milliLiter(s) IV Continuous <Continuous>  dextrose 5%. 1000 milliLiter(s) IV Continuous <Continuous>  dextrose 50% Injectable 25 Gram(s) IV Push once  dextrose 50% Injectable 12.5 Gram(s) IV Push once  dextrose 50% Injectable 25 Gram(s) IV Push once  gabapentin 100 milliGRAM(s) Oral three times a day  glucagon  Injectable 1 milliGRAM(s) IntraMuscular once  influenza   Vaccine 0.5 milliLiter(s) IntraMuscular once  insulin lispro (ADMELOG) corrective regimen sliding scale   SubCutaneous three times a day before meals  melatonin 3 milliGRAM(s) Oral at bedtime PRN  metoprolol succinate  milliGRAM(s) Oral daily  milrinone Infusion 0.25 MICROgram(s)/kG/Min IV Continuous <Continuous>  multivitamin 1 Tablet(s) Oral daily  ondansetron Injectable 4 milliGRAM(s) IV Push every 8 hours PRN  spironolactone 25 milliGRAM(s) Oral daily    Vitals:  T(C): 36.6 (22 @ 08:00), Max: 36.9 (22 @ 15:45)  HR: 71 (22 @ 08:00) (71 - 82)  BP: 109/78 (22 @ 08:00) (109/78 - 124/77)  BP(mean): --  RR: 17 (22 @ 08:00) (17 - 18)  SpO2: 98% (22 @ 08:00) (98% - 100%)    Daily     Daily Weight in k.5 (2022 04:40)        I&O's Summary    2022 07:01  -  2022 07:00  --------------------------------------------------------  IN: 1064.4 mL / OUT: 3950 mL / NET: -2885.6 mL    2022 07:01  -  2022 12:06  --------------------------------------------------------  IN: 5.6 mL / OUT: 100 mL / NET: -94.4 mL        Physical Exam:  Appearance: No Acute Distress  HEENT: JVP non elevated  Cardiovascular: RRR, Normal S1 S2, No murmurs/rubs/gallops  Respiratory: Clear to auscultation bilaterally  Gastrointestinal: Soft, Non-tender, non-distended	  Skin: no skin lesions  Neurologic: Non-focal  Extremities: No LE edema, warm and well perfused  Psychiatry: A & O x 3, Mood & affect appropriate      Labs:                        13.8   6.10  )-----------( 253      ( 2022 06:10 )             43.6         132<L>  |  86<L>  |  60.6<H>  ----------------------------<  102<H>  4.2   |  28.0  |  1.46<H>    Ca    9.3      2022 06:10  Mg     2.3         TPro  6.9  /  Alb  3.7  /  TBili  1.0  /  DBili  x   /  AST  48<H>  /  ALT  41<H>  /  AlkPhos  334<H>                       Subjective:  No acute events overnight.  Feels well, he would like to walk more.     Medications:  acetaminophen     Tablet .. 650 milliGRAM(s) Oral every 6 hours PRN  aluminum hydroxide/magnesium hydroxide/simethicone Suspension 30 milliLiter(s) Oral every 4 hours PRN  aMIOdarone    Tablet 200 milliGRAM(s) Oral daily  apixaban 5 milliGRAM(s) Oral every 12 hours  aspirin enteric coated 81 milliGRAM(s) Oral daily  atorvastatin 40 milliGRAM(s) Oral at bedtime  buMETAnide Infusion 2 mG/Hr IV Continuous <Continuous>  dextrose 40% Gel 15 Gram(s) Oral once  dextrose 5%. 1000 milliLiter(s) IV Continuous <Continuous>  dextrose 5%. 1000 milliLiter(s) IV Continuous <Continuous>  dextrose 50% Injectable 25 Gram(s) IV Push once  dextrose 50% Injectable 12.5 Gram(s) IV Push once  dextrose 50% Injectable 25 Gram(s) IV Push once  gabapentin 100 milliGRAM(s) Oral three times a day  glucagon  Injectable 1 milliGRAM(s) IntraMuscular once  influenza   Vaccine 0.5 milliLiter(s) IntraMuscular once  insulin lispro (ADMELOG) corrective regimen sliding scale   SubCutaneous three times a day before meals  melatonin 3 milliGRAM(s) Oral at bedtime PRN  metoprolol succinate  milliGRAM(s) Oral daily  milrinone Infusion 0.25 MICROgram(s)/kG/Min IV Continuous <Continuous>  multivitamin 1 Tablet(s) Oral daily  ondansetron Injectable 4 milliGRAM(s) IV Push every 8 hours PRN  spironolactone 25 milliGRAM(s) Oral daily    Vitals:  T(C): 36.6 (22 @ 08:00), Max: 36.9 (22 @ 15:45)  HR: 71 (22 @ 08:00) (71 - 82)  BP: 109/78 (22 @ 08:00) (109/78 - 124/77)  BP(mean): --  RR: 17 (22 @ 08:00) (17 - 18)  SpO2: 98% (22 @ 08:00) (98% - 100%)    Daily     Daily Weight in k.5 (2022 04:40)        I&O's Summary    2022 07:01  -  2022 07:00  --------------------------------------------------------  IN: 1064.4 mL / OUT: 3950 mL / NET: -2885.6 mL    2022 07:01  -  2022 12:06  --------------------------------------------------------  IN: 5.6 mL / OUT: 100 mL / NET: -94.4 mL        Physical Exam:  Appearance: No Acute Distress  HEENT: JVP 9cm  Cardiovascular: RRR, Normal S1 S2, No murmurs/rubs/gallops  Respiratory: Clear to auscultation bilaterally  Gastrointestinal: Soft, Non-tender, non-distended	  Skin: no skin lesions  Neurologic: Non-focal  Extremities: +1 LE edema b/l  Psychiatry: A & O x 3, Mood & affect appropriate      Labs:                        13.8   6.10  )-----------( 253      ( 2022 06:10 )             43.6         132<L>  |  86<L>  |  60.6<H>  ----------------------------<  102<H>  4.2   |  28.0  |  1.46<H>    Ca    9.3      2022 06:10  Mg     2.3         TPro  6.9  /  Alb  3.7  /  TBili  1.0  /  DBili  x   /  AST  48<H>  /  ALT  41<H>  /  AlkPhos  334<H>

## 2022-01-12 NOTE — PROGRESS NOTE ADULT - ATTENDING COMMENTS
Patient seen and exmined  Discussed with NP Nilda and Agree with above.     PHYSICAL EXAM:  Constitutional: No acute distress, alert and oriented by 3  HEENT: AT/NC, EOMI, PERRLA, Normal conjunctiva, no pharyngeal erythema, moist oral mucosa  Respiratory: CTA BL, equal breath sounds, no crackles or wheezing  Cardiovascular: RRR, + edema, legs wrapped in ace wraps  Gastrointestinal: soft, Non-tender, Non-distended + Bowel sounds, no rebound or guarding  Musculoskeletal: No joint edema  Neurological: CN 2-12 grossly intact, no focal deficits  Skin: warm, dry and intact  Psychiatric: normal mood and affect      Agree with above assessement and plan Patient seen and exmined  Discussed with NP Nilda and Agree with above.     PHYSICAL EXAM:  Constitutional: No acute distress, alert and oriented by 3  HEENT: AT/NC, EOMI, PERRLA, Normal conjunctiva, no pharyngeal erythema, moist oral mucosa  Respiratory: CTA BL, equal breath sounds, no crackles or wheezing  Cardiovascular: RRR, + edema, legs wrapped in ace wraps  Gastrointestinal: soft, Non-tender, Non-distended + Bowel sounds, no rebound or guarding  Musculoskeletal: No joint edema  Neurological: CN 2-12 grossly intact, no focal deficits  Skin: warm, dry and intact  Psychiatric: normal mood and affect      Agree with above assessment and plan  Discussed with HF Dr. Hewitt, planning to stop Bumex at 6pm and start Torsemide tomorrow.

## 2022-01-12 NOTE — PROGRESS NOTE ADULT - SUBJECTIVE AND OBJECTIVE BOX
CC: CHF   INTERVAL HPI/OVERNIGHT EVENTS: Patient seen and examined. No acute events overnight. Remains on Milrinone and Lasix drips. Voiding in good amounts. Denies chest pain, SOB, dizziness, nausea, vomiting, fever, chills. ACE wraps in place with some improvement in leg swelling.     Vital Signs Last 24 Hrs  T(C): 36.6 (12 Jan 2022 08:00), Max: 36.9 (11 Jan 2022 15:45)  T(F): 97.8 (12 Jan 2022 08:00), Max: 98.4 (11 Jan 2022 15:45)  HR: 71 (12 Jan 2022 08:00) (71 - 82)  BP: 109/78 (12 Jan 2022 08:00) (109/78 - 124/77)  BP(mean): --  RR: 17 (12 Jan 2022 08:00) (17 - 18)  SpO2: 98% (12 Jan 2022 08:00) (98% - 100%)    PE:  CONSTITUTIONAL: NAD, sitting up at side of bed  ENMT: Moist oral mucosa, no pharyngeal injection or exudates  RESPIRATORY: Normal respiratory effort; lungs are clear to auscultation bilaterally  CARDIOVASCULAR: Regular rate and rhythm, normal S1 and S2, + lower extremity edema; legs wrapped in ace bandages  ABDOMEN: Nontender to palpation, normoactive bowel sounds, no rebound/guarding;   MUSCLOSKELETAL:  no clubbing or cyanosis of digits; no joint swelling or tenderness to palpation  PSYCH: Calm and cooperative  NEUROLOGY: CN 2-12 are intact and symmetric; no gross sensory deficits;   SKIN: No rashes; no palpable lesions      I&O's Detail    11 Jan 2022 07:01  -  12 Jan 2022 07:00  --------------------------------------------------------  IN:    Bumetanide: 240 mL    Milrinone: 134.4 mL    Oral Fluid: 690 mL  Total IN: 1064.4 mL    OUT:    Voided (mL): 3950 mL  Total OUT: 3950 mL    Total NET: -2885.6 mL      12 Jan 2022 07:01  -  12 Jan 2022 13:13  --------------------------------------------------------  IN:    Milrinone: 5.6 mL  Total IN: 5.6 mL    OUT:    Voided (mL): 100 mL  Total OUT: 100 mL    Total NET: -94.4 mL                                    13.8   6.10  )-----------( 253      ( 12 Jan 2022 06:10 )             43.6     12 Jan 2022 06:10    132    |  86     |  60.6   ----------------------------<  102    4.2     |  28.0   |  1.46     Ca    9.3        12 Jan 2022 06:10  Mg     2.3       12 Jan 2022 06:10    TPro  6.9    /  Alb  3.7    /  TBili  1.0    /  DBili  x      /  AST  48     /  ALT  41     /  AlkPhos  334    11 Jan 2022 06:11      CAPILLARY BLOOD GLUCOSE      POCT Blood Glucose.: 114 mg/dL (12 Jan 2022 12:11)  POCT Blood Glucose.: 126 mg/dL (12 Jan 2022 08:19)  POCT Blood Glucose.: 181 mg/dL (11 Jan 2022 21:08)  POCT Blood Glucose.: 113 mg/dL (11 Jan 2022 17:03)    LIVER FUNCTIONS - ( 11 Jan 2022 06:11 )  Alb: 3.7 g/dL / Pro: 6.9 g/dL / ALK PHOS: 334 U/L / ALT: 41 U/L / AST: 48 U/L / GGT: x               MEDICATIONS  (STANDING):  aMIOdarone    Tablet 200 milliGRAM(s) Oral daily  apixaban 5 milliGRAM(s) Oral every 12 hours  aspirin enteric coated 81 milliGRAM(s) Oral daily  atorvastatin 40 milliGRAM(s) Oral at bedtime  buMETAnide Infusion 2 mG/Hr (10 mL/Hr) IV Continuous <Continuous>  dextrose 40% Gel 15 Gram(s) Oral once  dextrose 5%. 1000 milliLiter(s) (50 mL/Hr) IV Continuous <Continuous>  dextrose 5%. 1000 milliLiter(s) (100 mL/Hr) IV Continuous <Continuous>  dextrose 50% Injectable 25 Gram(s) IV Push once  dextrose 50% Injectable 12.5 Gram(s) IV Push once  dextrose 50% Injectable 25 Gram(s) IV Push once  gabapentin 100 milliGRAM(s) Oral three times a day  glucagon  Injectable 1 milliGRAM(s) IntraMuscular once  influenza   Vaccine 0.5 milliLiter(s) IntraMuscular once  insulin lispro (ADMELOG) corrective regimen sliding scale   SubCutaneous three times a day before meals  metoprolol succinate  milliGRAM(s) Oral daily  milrinone Infusion 0.25 MICROgram(s)/kG/Min (5.63 mL/Hr) IV Continuous <Continuous>  multivitamin 1 Tablet(s) Oral daily  spironolactone 25 milliGRAM(s) Oral daily    MEDICATIONS  (PRN):  acetaminophen     Tablet .. 650 milliGRAM(s) Oral every 6 hours PRN Temp greater or equal to 38C (100.4F), Mild Pain (1 - 3)  aluminum hydroxide/magnesium hydroxide/simethicone Suspension 30 milliLiter(s) Oral every 4 hours PRN Dyspepsia  melatonin 3 milliGRAM(s) Oral at bedtime PRN Insomnia  ondansetron Injectable 4 milliGRAM(s) IV Push every 8 hours PRN Nausea and/or Vomiting      RADIOLOGY & ADDITIONAL TESTS:

## 2022-01-12 NOTE — PROGRESS NOTE ADULT - ASSESSMENT
61 yr old male with CAD, dilated cardiomyopathy, s/p ICD, atrial flutter on Eliquis, substance abuse was admitted initially from 12/24 for decompensated CHF, was on Milrinone and Bumex drips, left AMA on 12/31 and returned to the ED later in the day as he had to take care of personal matter. He reported shortness of breath and right arm and foot pain on presentation. Cardiology was consulted, he was placed on oral Bumex., subsequently transitioned to Bumex gtt. Patient reported he was recently evicted from his apartment and is homeless, social work consult placed. Seen by PT and recommend  CHAPINCITO    Decompensated HFrEF s/p ICD:  Continue Bumex and Milrinone at current rates  Continue Aldactone.  daily weight and strict I/O  Continue statin, metoprolol  Supplemental oxygen  monitor lytes and renal function  continue telemetry  Not candidate for advanced therapies due to smoking/cocaine use history and non-compliance.  Wrap legs with ACE wraps    Atrial flutter:  Continue Amiodarone,  Metoprolol, Eliquis.    Diabetes mellitus:  HGA1c 6.2 in November, blood sugar tolerable on current diet, will dc accuchecks    DERRICK on CKD:  increase in cr  but has been making Urine.   Monitor renal function on Aldactone and Lasix.    DVT prophylaxis: On Eliquis.  Dispo: patient remains medically acute on IV Bumex and Milrinone.

## 2022-01-12 NOTE — PROGRESS NOTE ADULT - ASSESSMENT
62 y/o with h/o chronic systolic HF ACC/AHA stage C, NICMP LVEF <20% LVIDd 6.96cm, nonobstructive CAD, s/p ICD, HTN, active tobacco use, ?COPD who presented with decompensated HF. He reports being compliant with medications but not so much with low Na diet due to living in a group home.   Of note, the patient was recently admitted for acute decompensated HF in the setting of aflutter w/ RVR. He underwent successful VANESSA/DCCV on 11/22/21. This is his 4th hospitalization in the past year. He was started on bumex/milrinone gtt with good response.     Cards: Alison ([facility])    Pertinent Cardiac Studies  11/18/21 EKG Aflutter LAD. PRWP. NS T wave changes    11/20/21 LVEF <20% LVIDd 6.96cm VTI 5cm, RV severely enlarged with severely reduced systolic HF, sev biatrial enlargement, mod-sev MR, moderate TR, RVSP 38mmHg    11/22/21 VANESSA limited study, LVEF <20%, no FADUMO thrombus    2016 [facility] LM minor irreg, mid LAD 50%, LCx minor irreg, RCA prox 60% mid 85%   60 y/o with h/o chronic systolic HF ACC/AHA stage C, NICMP LVEF <20% LVIDd 6.96cm, nonobstructive CAD, s/p ICD, HTN, active tobacco use, ?COPD who presented with decompensated HF. He reports being compliant with medications but not so much with low Na diet due to living in a group home.   Of note, the patient was recently admitted for acute decompensated HF in the setting of aflutter w/ RVR. He underwent successful VANESSA/DCCV on 11/22/21. This is his 4th hospitalization in the past year. He was started on bumex/milrinone gtt with moderate response.     Cards: Alison ([facility])    Pertinent Cardiac Studies  11/18/21 EKG Aflutter LAD. PRWP. NS T wave changes    11/20/21 LVEF <20% LVIDd 6.96cm VTI 5cm, RV severely enlarged with severely reduced systolic HF, sev biatrial enlargement, mod-sev MR, moderate TR, RVSP 38mmHg    11/22/21 VANESSA limited study, LVEF <20%, no FADUOM thrombus    2016 [facility] LM minor irreg, mid LAD 50%, LCx minor irreg, RCA prox 60% mid 85%

## 2022-01-13 LAB
ANION GAP SERPL CALC-SCNC: 15 MMOL/L — SIGNIFICANT CHANGE UP (ref 5–17)
BUN SERPL-MCNC: 69.8 MG/DL — HIGH (ref 8–20)
CALCIUM SERPL-MCNC: 9.1 MG/DL — SIGNIFICANT CHANGE UP (ref 8.6–10.2)
CHLORIDE SERPL-SCNC: 87 MMOL/L — LOW (ref 98–107)
CO2 SERPL-SCNC: 31 MMOL/L — HIGH (ref 22–29)
CREAT SERPL-MCNC: 1.74 MG/DL — HIGH (ref 0.5–1.3)
GLUCOSE SERPL-MCNC: 103 MG/DL — HIGH (ref 70–99)
HCT VFR BLD CALC: 41.9 % — SIGNIFICANT CHANGE UP (ref 39–50)
HGB BLD-MCNC: 13.4 G/DL — SIGNIFICANT CHANGE UP (ref 13–17)
MAGNESIUM SERPL-MCNC: 2.3 MG/DL — SIGNIFICANT CHANGE UP (ref 1.8–2.6)
MCHC RBC-ENTMCNC: 27 PG — SIGNIFICANT CHANGE UP (ref 27–34)
MCHC RBC-ENTMCNC: 32 GM/DL — SIGNIFICANT CHANGE UP (ref 32–36)
MCV RBC AUTO: 84.5 FL — SIGNIFICANT CHANGE UP (ref 80–100)
PLATELET # BLD AUTO: 267 K/UL — SIGNIFICANT CHANGE UP (ref 150–400)
POTASSIUM SERPL-MCNC: 4.2 MMOL/L — SIGNIFICANT CHANGE UP (ref 3.5–5.3)
POTASSIUM SERPL-SCNC: 4.2 MMOL/L — SIGNIFICANT CHANGE UP (ref 3.5–5.3)
RBC # BLD: 4.96 M/UL — SIGNIFICANT CHANGE UP (ref 4.2–5.8)
RBC # FLD: 16.4 % — HIGH (ref 10.3–14.5)
SARS-COV-2 RNA SPEC QL NAA+PROBE: SIGNIFICANT CHANGE UP
SODIUM SERPL-SCNC: 133 MMOL/L — LOW (ref 135–145)
WBC # BLD: 5.12 K/UL — SIGNIFICANT CHANGE UP (ref 3.8–10.5)
WBC # FLD AUTO: 5.12 K/UL — SIGNIFICANT CHANGE UP (ref 3.8–10.5)

## 2022-01-13 PROCEDURE — 99233 SBSQ HOSP IP/OBS HIGH 50: CPT

## 2022-01-13 PROCEDURE — 99232 SBSQ HOSP IP/OBS MODERATE 35: CPT

## 2022-01-13 RX ORDER — MILRINONE LACTATE 1 MG/ML
0.12 INJECTION, SOLUTION INTRAVENOUS
Qty: 20 | Refills: 0 | Status: DISCONTINUED | OUTPATIENT
Start: 2022-01-13 | End: 2022-01-14

## 2022-01-13 RX ADMIN — ATORVASTATIN CALCIUM 40 MILLIGRAM(S): 80 TABLET, FILM COATED ORAL at 21:47

## 2022-01-13 RX ADMIN — Medication 100 MILLIGRAM(S): at 11:16

## 2022-01-13 RX ADMIN — APIXABAN 5 MILLIGRAM(S): 2.5 TABLET, FILM COATED ORAL at 16:32

## 2022-01-13 RX ADMIN — MILRINONE LACTATE 5.63 MICROGRAM(S)/KG/MIN: 1 INJECTION, SOLUTION INTRAVENOUS at 01:10

## 2022-01-13 RX ADMIN — GABAPENTIN 100 MILLIGRAM(S): 400 CAPSULE ORAL at 05:42

## 2022-01-13 RX ADMIN — Medication 81 MILLIGRAM(S): at 11:16

## 2022-01-13 RX ADMIN — GABAPENTIN 100 MILLIGRAM(S): 400 CAPSULE ORAL at 21:46

## 2022-01-13 RX ADMIN — AMIODARONE HYDROCHLORIDE 200 MILLIGRAM(S): 400 TABLET ORAL at 05:39

## 2022-01-13 RX ADMIN — GABAPENTIN 100 MILLIGRAM(S): 400 CAPSULE ORAL at 16:28

## 2022-01-13 RX ADMIN — SPIRONOLACTONE 25 MILLIGRAM(S): 25 TABLET, FILM COATED ORAL at 11:16

## 2022-01-13 RX ADMIN — Medication 1 TABLET(S): at 11:16

## 2022-01-13 RX ADMIN — MILRINONE LACTATE 2.58 MICROGRAM(S)/KG/MIN: 1 INJECTION, SOLUTION INTRAVENOUS at 20:10

## 2022-01-13 RX ADMIN — APIXABAN 5 MILLIGRAM(S): 2.5 TABLET, FILM COATED ORAL at 05:33

## 2022-01-13 NOTE — PROGRESS NOTE ADULT - ASSESSMENT
61 yr old male with CAD, dilated cardiomyopathy, s/p ICD, atrial flutter on Eliquis, substance abuse was admitted initially from 12/24 for decompensated CHF, was on Milrinone and Bumex drips, left AMA on 12/31 and returned to the ED later in the day as he had to take care of personal matter. He reported shortness of breath and right arm and foot pain on presentation. Cardiology was consulted, he was placed on oral Bumex., subsequently transitioned to Bumex gtt. Patient reported he was recently evicted from his apartment and is homeless, social work consult placed. Seen by PT and recommend  CHAPINCITO    Decompensated HFrEF s/p ICD:  Bumex Drip stopped on 1/12.  Decrease Milrinone today and hopefully will stop tomorrow.   Diuretic holiday today  Continue Aldactone.  daily weight and strict I/O  Continue statin, metoprolol  monitor lytes and renal function  continue telemetry  Not candidate for advanced therapies due to smoking/cocaine use history and non-compliance.  Wrap legs with ACE wraps    Atrial flutter:  Continue Amiodarone,  Metoprolol, Eliquis.    Diabetes mellitus:  HGA1c 6.2 in November, blood sugar tolerable on current diet, will dc accuchecks    DERRICK on CKD:  increase in cr  but has been making Urine.   Monitor renal function on Aldactone  Diuretic holiday today.     DVT prophylaxis: On Eliquis.  Dispo: LASHONDA planning in 48 hours, SW notified plan is for [facility]IN[facility]O.

## 2022-01-13 NOTE — PROGRESS NOTE ADULT - PROBLEM SELECTOR PLAN 1
ACC/AHA stage C, NICMP (LVEF <20%, LVIDd 6.96cm)  Clinically hypervolemic, cool extremities.    Inotropic support: Continue Milrinone gtt 0.250 mcg/kg/min  Monitor markers of perfusion daily including lactate, LFTs  Diuretics: Bumex gtt 2 mg/hr until 6pm. PLan to start Torsemide 60mg PO BID tomorrow.  Please check BMP q12 hrs while on gtt. Maintain K+ >4.0 and Mg >2.0.  1.5L Fluid restriction, strict I&O and daily STANDING weights.  GDMT: Continue Toprol-XL 100mg daily and aldactone 25mg daily. Plan to optimize w/ ACEi/ARB/ARNi once euvolemic. SGLT2i as outpt.  Device: s/p ICD   Not candidate for advanced therapies due to smoking/cocaine use history and non-compliance. ACC/AHA stage C, NICMP (LVEF <20%, LVIDd 6.96cm)  Clinically close to euvolemia, cool extremities.    Inotropic support: Begin to wean. Reduce Milrinone gtt  to 0.125 mcg/kg/min  Monitor markers of perfusion daily including lactate, LFTs  Diuretics: Diuretic holiday today. Plan to start Torsemide 60mg PO daily tomorrow if sCr remains stable.  Please check BMP q12 hrs while on gtt. Maintain K+ >4.0 and Mg >2.0.  1.5L Fluid restriction, strict I&O and daily STANDING weights.  GDMT: Continue Toprol-XL 100mg daily and aldactone 25mg daily. Plan to optimize w/ ACEi/ARB/ARNi once euvolemic. SGLT2i as outpt.  Device: s/p ICD   Not candidate for advanced therapies due to smoking/cocaine use history and non-compliance. ACC/AHA stage C, NICMP (LVEF <20%, LVIDd 6.96cm)  Clinically close to euvolemia, cool extremities.    Inotropic support: Begin to wean. Reduce Milrinone gtt  to 0.125 mcg/kg/min  Monitor markers of perfusion daily including lactate, LFTs  Diuretics: Diuretic holiday today. Plan to start Torsemide 60mg PO daily tomorrow if sCr remains stable.  1.5L Fluid restriction, strict I&O and daily STANDING weights.  GDMT: Continue Toprol-XL 100mg daily and aldactone 25mg daily. Plan to optimize w/ ACEi/ARB/ARNi once euvolemic. SGLT2i as outpt.  Device: s/p ICD   Not candidate for advanced therapies due to smoking/cocaine use history and non-compliance.

## 2022-01-13 NOTE — PROGRESS NOTE ADULT - SUBJECTIVE AND OBJECTIVE BOX
[facility] Division of Hospital Medicine    Chief Complaint:  CHF    SUBJECTIVE / OVERNIGHT EVENTS: Patient seen and examined. His leg swelling is much better. He has concerns about his medications telling me a nurse told him he is  on alot of blood pressures pills but his blood pressure is not high. I explained that for him these medications are for his heart failure.     Patient denies chest pain, SOB, abd pain, N/V, fever, chills, dysuria or any other complaints. All remainder ROS negative.     MEDICATIONS  (STANDING):  aMIOdarone    Tablet 200 milliGRAM(s) Oral daily  apixaban 5 milliGRAM(s) Oral every 12 hours  aspirin enteric coated 81 milliGRAM(s) Oral daily  atorvastatin 40 milliGRAM(s) Oral at bedtime  gabapentin 100 milliGRAM(s) Oral three times a day  influenza   Vaccine 0.5 milliLiter(s) IntraMuscular once  metoprolol succinate  milliGRAM(s) Oral daily  milrinone Infusion 0.125 MICROgram(s)/kG/Min (2.58 mL/Hr) IV Continuous <Continuous>  multivitamin 1 Tablet(s) Oral daily  spironolactone 25 milliGRAM(s) Oral daily    MEDICATIONS  (PRN):  acetaminophen     Tablet .. 650 milliGRAM(s) Oral every 6 hours PRN Temp greater or equal to 38C (100.4F), Mild Pain (1 - 3)  aluminum hydroxide/magnesium hydroxide/simethicone Suspension 30 milliLiter(s) Oral every 4 hours PRN Dyspepsia  melatonin 3 milliGRAM(s) Oral at bedtime PRN Insomnia  ondansetron Injectable 4 milliGRAM(s) IV Push every 8 hours PRN Nausea and/or Vomiting        I&O's Summary    12 Jan 2022 07:01  -  13 Jan 2022 07:00  --------------------------------------------------------  IN: 1684.4 mL / OUT: 3270 mL / NET: -1585.6 mL        PHYSICAL EXAM:  Vital Signs Last 24 Hrs  T(C): 36.7 (13 Jan 2022 11:00), Max: 36.8 (12 Jan 2022 15:23)  T(F): 98.1 (13 Jan 2022 11:00), Max: 98.2 (12 Jan 2022 15:23)  HR: 75 (13 Jan 2022 11:00) (70 - 76)  BP: 111/71 (13 Jan 2022 11:00) (101/72 - 111/71)  BP(mean): 85 (13 Jan 2022 11:00) (85 - 85)  RR: 18 (13 Jan 2022 11:00) (18 - 18)  SpO2: 94% (13 Jan 2022 11:00) (93% - 95%)        CONSTITUTIONAL: NAD  ENMT: Moist oral mucosa, no pharyngeal injection or exudates  RESPIRATORY: Normal respiratory effort; lungs are clear to auscultation bilaterally  CARDIOVASCULAR: Regular rate and rhythm, normal S1 and S2, 1 + lower extremity edema  ABDOMEN: Nontender to palpation, normoactive bowel sounds, no rebound/guarding;   MUSCLOSKELETAL:  no clubbing or cyanosis of digits; no joint swelling or tenderness to palpation  PSYCH: A+O to person, place, and time; affect appropriate  NEUROLOGY: CN 2-12 are intact and symmetric; no gross sensory deficits;   SKIN: No rashes; no palpable lesions       LABS:                        13.4   5.12  )-----------( 267      ( 13 Jan 2022 06:45 )             41.9     01-13    133<L>  |  87<L>  |  69.8<H>  ----------------------------<  103<H>  4.2   |  31.0<H>  |  1.74<H>    Ca    9.1      13 Jan 2022 06:45  Mg     2.3     01-13                CAPILLARY BLOOD GLUCOSE      POCT Blood Glucose.: 116 mg/dL (12 Jan 2022 20:58)  POCT Blood Glucose.: 100 mg/dL (12 Jan 2022 16:54)        RADIOLOGY & ADDITIONAL TESTS:  Results Reviewed:   Imaging Personally Reviewed:  Electrocardiogram Personally Reviewed:

## 2022-01-13 NOTE — PROGRESS NOTE ADULT - ASSESSMENT
62 y/o with h/o chronic systolic HF ACC/AHA stage C, NICMP LVEF <20% LVIDd 6.96cm, nonobstructive CAD, s/p ICD, HTN, active tobacco use, ?COPD who presented with decompensated HF. He reports being compliant with medications but not so much with low Na diet due to living in a group home.   Of note, the patient was recently admitted for acute decompensated HF in the setting of aflutter w/ RVR. He underwent successful VANESSA/DCCV on 11/22/21. This is his 4th hospitalization in the past year. He was started on bumex/milrinone gtt with moderate response.     Cards: Alison ([facility])    Pertinent Cardiac Studies  11/18/21 EKG Aflutter LAD. PRWP. NS T wave changes    11/20/21 LVEF <20% LVIDd 6.96cm VTI 5cm, RV severely enlarged with severely reduced systolic HF, sev biatrial enlargement, mod-sev MR, moderate TR, RVSP 38mmHg    11/22/21 VANESSA limited study, LVEF <20%, no FADUMO thrombus    2016 [facility] LM minor irreg, mid LAD 50%, LCx minor irreg, RCA prox 60% mid 85%

## 2022-01-13 NOTE — PROGRESS NOTE ADULT - SUBJECTIVE AND OBJECTIVE BOX
[facility] Advanced Heart Failure  Office: [address] [place] [address]  Telephone: [phone]/Fax: [phone]    Subjective:    Medications:  acetaminophen     Tablet .. 650 milliGRAM(s) Oral every 6 hours PRN  aluminum hydroxide/magnesium hydroxide/simethicone Suspension 30 milliLiter(s) Oral every 4 hours PRN  aMIOdarone    Tablet 200 milliGRAM(s) Oral daily  apixaban 5 milliGRAM(s) Oral every 12 hours  aspirin enteric coated 81 milliGRAM(s) Oral daily  atorvastatin 40 milliGRAM(s) Oral at bedtime  gabapentin 100 milliGRAM(s) Oral three times a day  influenza   Vaccine 0.5 milliLiter(s) IntraMuscular once  melatonin 3 milliGRAM(s) Oral at bedtime PRN  metoprolol succinate  milliGRAM(s) Oral daily  milrinone Infusion 0.25 MICROgram(s)/kG/Min IV Continuous <Continuous>  multivitamin 1 Tablet(s) Oral daily  ondansetron Injectable 4 milliGRAM(s) IV Push every 8 hours PRN  spironolactone 25 milliGRAM(s) Oral daily    Vital Signs Last 24 Hours  T(C): 36.7 (22 @ 11:00), Max: 36.8 (22 @ 15:23)  HR: 75 (22 @ 11:00) (70 - 76)  BP: 111/71 (22 @ 11:00) (101/72 - 111/71)  RR: 18 (22 @ 11:00) (18 - 18)  SpO2: 94% (22 @ 11:00) (93% - 95%)    Weight in k.3 ( @ 05:47)    I&O's Summary    2022 07:01  -  2022 07:00  --------------------------------------------------------  IN: 1684.4 mL / OUT: 3270 mL / NET: -1585.6 mL    Tele: Sinus rhythm    Physical Exam:  General: No distress. Comfortable.  HEENT: EOMs intact. Sclera non-icteric   Neck: JVP ~9-10cm H20  Chest: fine bibasilar crackles  CV: RRR. Normal S1 and S2. No murmurs, rub, or gallops.   LE: Cool to touch, trace B/L  LE edema - significantly improved  Abdomen: Soft  Skin: dry, intact  Neurology: Alert and oriented times three. Sensation intact  Psych: appears frustrated    Labs:                        13.4   5.12  )-----------( 267      ( 2022 06:45 )             41.9         133<L>  |  87<L>  |  69.8<H>  ----------------------------<  103<H>  4.2   |  31.0<H>  |  1.74<H>    Ca    9.1      2022 06:45  Mg     2.3                              [facility] Advanced Heart Failure  Office: [address] [place] [address]  Telephone: [phone]/Fax: [phone]    Subjective: NAEO. Pt. c/o several bloody noses during this admission, no longer wearing supplemental 02. Endorses very good UOP and intermittent dizziness upon standing. Denies SOB.     Medications:  acetaminophen     Tablet .. 650 milliGRAM(s) Oral every 6 hours PRN  aluminum hydroxide/magnesium hydroxide/simethicone Suspension 30 milliLiter(s) Oral every 4 hours PRN  aMIOdarone    Tablet 200 milliGRAM(s) Oral daily  apixaban 5 milliGRAM(s) Oral every 12 hours  aspirin enteric coated 81 milliGRAM(s) Oral daily  atorvastatin 40 milliGRAM(s) Oral at bedtime  gabapentin 100 milliGRAM(s) Oral three times a day  influenza   Vaccine 0.5 milliLiter(s) IntraMuscular once  melatonin 3 milliGRAM(s) Oral at bedtime PRN  metoprolol succinate  milliGRAM(s) Oral daily  milrinone Infusion 0.25 MICROgram(s)/kG/Min IV Continuous <Continuous>  multivitamin 1 Tablet(s) Oral daily  ondansetron Injectable 4 milliGRAM(s) IV Push every 8 hours PRN  spironolactone 25 milliGRAM(s) Oral daily    Vital Signs Last 24 Hours  T(C): 36.7 (22 @ 11:00), Max: 36.8 (22 @ 15:23)  HR: 75 (22 @ 11:00) (70 - 76)  BP: 111/71 (22 @ 11:00) (101/72 - 111/71)  RR: 18 (22 @ 11:00) (18 - 18)  SpO2: 94% (22 @ 11:00) (93% - 95%)    Weight in k.3 ( @ 05:47)    I&O's Summary    2022 07:01  -  2022 07:00  --------------------------------------------------------  IN: 1684.4 mL / OUT: 3270 mL / NET: -1585.6 mL    Tele: Sinus rhythm    Physical Exam:  General: No distress. Comfortable.  HEENT: EOMs intact. Sclera non-icteric   Neck: JVP ~9-10cm H20  Chest: fine bibasilar crackles  CV: RRR. Normal S1 and S2. No murmurs, rub, or gallops.   LE: Cool to touch, trace B/L  LE edema - significantly improved  Abdomen: Soft  Skin: dry, intact  Neurology: Alert and oriented times three. Sensation intact  Psych: appears frustrated    Labs:                        13.4   5.12  )-----------( 267      ( 2022 06:45 )             41.9         133<L>  |  87<L>  |  69.8<H>  ----------------------------<  103<H>  4.2   |  31.0<H>  |  1.74<H>    Ca    9.1      2022 06:45  Mg     2.3

## 2022-01-14 LAB
ALBUMIN SERPL ELPH-MCNC: 3.8 G/DL — SIGNIFICANT CHANGE UP (ref 3.3–5.2)
ALP SERPL-CCNC: 387 U/L — HIGH (ref 40–120)
ALT FLD-CCNC: 44 U/L — HIGH
ANION GAP SERPL CALC-SCNC: 18 MMOL/L — HIGH (ref 5–17)
AST SERPL-CCNC: 51 U/L — HIGH
BILIRUB SERPL-MCNC: 1 MG/DL — SIGNIFICANT CHANGE UP (ref 0.4–2)
BUN SERPL-MCNC: 80.9 MG/DL — HIGH (ref 8–20)
CALCIUM SERPL-MCNC: 9.2 MG/DL — SIGNIFICANT CHANGE UP (ref 8.6–10.2)
CHLORIDE SERPL-SCNC: 89 MMOL/L — LOW (ref 98–107)
CO2 SERPL-SCNC: 27 MMOL/L — SIGNIFICANT CHANGE UP (ref 22–29)
CREAT SERPL-MCNC: 1.65 MG/DL — HIGH (ref 0.5–1.3)
GLUCOSE SERPL-MCNC: 130 MG/DL — HIGH (ref 70–99)
HCT VFR BLD CALC: 42.6 % — SIGNIFICANT CHANGE UP (ref 39–50)
HGB BLD-MCNC: 13.4 G/DL — SIGNIFICANT CHANGE UP (ref 13–17)
MAGNESIUM SERPL-MCNC: 2.4 MG/DL — SIGNIFICANT CHANGE UP (ref 1.6–2.6)
MCHC RBC-ENTMCNC: 27.1 PG — SIGNIFICANT CHANGE UP (ref 27–34)
MCHC RBC-ENTMCNC: 31.5 GM/DL — LOW (ref 32–36)
MCV RBC AUTO: 86.1 FL — SIGNIFICANT CHANGE UP (ref 80–100)
PLATELET # BLD AUTO: 301 K/UL — SIGNIFICANT CHANGE UP (ref 150–400)
POTASSIUM SERPL-MCNC: 4.3 MMOL/L — SIGNIFICANT CHANGE UP (ref 3.5–5.3)
POTASSIUM SERPL-SCNC: 4.3 MMOL/L — SIGNIFICANT CHANGE UP (ref 3.5–5.3)
PROT SERPL-MCNC: 7.3 G/DL — SIGNIFICANT CHANGE UP (ref 6.6–8.7)
RBC # BLD: 4.95 M/UL — SIGNIFICANT CHANGE UP (ref 4.2–5.8)
RBC # FLD: 16.9 % — HIGH (ref 10.3–14.5)
SARS-COV-2 RNA SPEC QL NAA+PROBE: SIGNIFICANT CHANGE UP
SODIUM SERPL-SCNC: 134 MMOL/L — LOW (ref 135–145)
WBC # BLD: 5.54 K/UL — SIGNIFICANT CHANGE UP (ref 3.8–10.5)
WBC # FLD AUTO: 5.54 K/UL — SIGNIFICANT CHANGE UP (ref 3.8–10.5)

## 2022-01-14 PROCEDURE — 99232 SBSQ HOSP IP/OBS MODERATE 35: CPT

## 2022-01-14 PROCEDURE — 71045 X-RAY EXAM CHEST 1 VIEW: CPT | Mod: 26

## 2022-01-14 PROCEDURE — 99233 SBSQ HOSP IP/OBS HIGH 50: CPT

## 2022-01-14 RX ADMIN — APIXABAN 5 MILLIGRAM(S): 2.5 TABLET, FILM COATED ORAL at 17:29

## 2022-01-14 RX ADMIN — ATORVASTATIN CALCIUM 40 MILLIGRAM(S): 80 TABLET, FILM COATED ORAL at 22:26

## 2022-01-14 RX ADMIN — GABAPENTIN 100 MILLIGRAM(S): 400 CAPSULE ORAL at 13:10

## 2022-01-14 RX ADMIN — Medication 1 TABLET(S): at 08:25

## 2022-01-14 RX ADMIN — GABAPENTIN 100 MILLIGRAM(S): 400 CAPSULE ORAL at 05:32

## 2022-01-14 RX ADMIN — APIXABAN 5 MILLIGRAM(S): 2.5 TABLET, FILM COATED ORAL at 05:32

## 2022-01-14 RX ADMIN — SPIRONOLACTONE 25 MILLIGRAM(S): 25 TABLET, FILM COATED ORAL at 05:32

## 2022-01-14 RX ADMIN — Medication 81 MILLIGRAM(S): at 08:25

## 2022-01-14 RX ADMIN — GABAPENTIN 100 MILLIGRAM(S): 400 CAPSULE ORAL at 22:27

## 2022-01-14 RX ADMIN — AMIODARONE HYDROCHLORIDE 200 MILLIGRAM(S): 400 TABLET ORAL at 05:32

## 2022-01-14 RX ADMIN — Medication 3 MILLIGRAM(S): at 22:27

## 2022-01-14 NOTE — PROGRESS NOTE ADULT - PROBLEM SELECTOR PROBLEM 2
DERRICK (acute kidney injury)

## 2022-01-14 NOTE — PROGRESS NOTE ADULT - ASSESSMENT
61 yr old male with CAD, dilated cardiomyopathy, s/p ICD, atrial flutter on Eliquis, substance abuse was admitted initially from 12/24 for decompensated CHF, was on Milrinone and Bumex drips, left AMA on 12/31 and returned to the ED later in the day as he had to take care of personal matter. He reported shortness of breath and right arm and foot pain on presentation. Cardiology was consulted, he was placed on oral Bumex., subsequently transitioned to Bumex gtt. Patient reported he was recently evicted from his apartment and is homeless, social work consult placed. Seen by PT and recommend  CHAPINCITO    Decompensated HFrEF s/p ICD: improving  Bumex Drip stopped on 1/12.   Stop Milrinone 1/14  Diuretic holiday again today  Start Torsemdie 60mg daily tomorrow.   Continue Aldactone.  daily weight and strict I/O  Continue statin, metoprolol  monitor lytes and renal function  continue telemetry  Not candidate for advanced therapies due to smoking/cocaine use history and non-compliance.  Wrap legs with ACE wraps  repeat CXR    Atrial flutter:  Continue Amiodarone,  Metoprolol, Eliquis.    Diabetes mellitus:  HGA1c 6.2 in November, blood sugar tolerable on current diet,     DERRICK on CKD:  increase in cr  but has been making Urine.   Monitor renal function on Aldactone  Diuretic holiday today.     DVT prophylaxis: On Eliquis.  Dispo: LASHONDA planing to rehab Sunday or Monday. SW working on this.  61 yr old male with CAD, dilated cardiomyopathy, s/p ICD, atrial flutter on Eliquis, substance abuse was admitted initially from 12/24 for decompensated CHF, was on Milrinone and Bumex drips, left AMA on 12/31 and returned to the ED later in the day as he had to take care of personal matter. He reported shortness of breath and right arm and foot pain on presentation. Cardiology was consulted, he was placed on oral Bumex., subsequently transitioned to Bumex gtt. Patient reported he was recently evicted from his apartment and is homeless, social work consult placed. Seen by PT and recommend  CHAPINCITO    Decompensated HFrEF s/p ICD: improving  Bumex Drip stopped on 1/12.   Stop Milrinone 1/14  Diuretic holiday again today  Start Torsemdie 40mg daily tomorrow.   Continue Aldactone.  daily weight and strict I/O  Continue statin, metoprolol  monitor lytes and renal function  continue telemetry  Not candidate for advanced therapies due to smoking/cocaine use history and non-compliance.  Wrap legs with ACE wraps  repeat CXR    Atrial flutter:  Continue Amiodarone,  Metoprolol, Eliquis.    Diabetes mellitus:  HGA1c 6.2 in November, blood sugar tolerable on current diet,     DERRICK on CKD:  increase in cr  but has been making Urine.   Monitor renal function on Aldactone  Diuretic holiday today.     DVT prophylaxis: On Eliquis.  Dispo: LASHONDA planing to rehab Sunday or Monday. SW working on this.

## 2022-01-14 NOTE — PROGRESS NOTE ADULT - PROBLEM SELECTOR PROBLEM 4
H/O atrial flutter

## 2022-01-14 NOTE — PROGRESS NOTE ADULT - SUBJECTIVE AND OBJECTIVE BOX
[facility] Division of Hospital Medicine    Chief Complaint:  CHF    SUBJECTIVE / OVERNIGHT EVENTS: Patient seen and examined. Off Bumex drip. He offers no complaints. Agreeable to going to rehab on discharge.      Patient denies chest pain, SOB, abd pain, N/V, fever, chills, dysuria or any other complaints. All remainder ROS negative.     MEDICATIONS  (STANDING):  aMIOdarone    Tablet 200 milliGRAM(s) Oral daily  apixaban 5 milliGRAM(s) Oral every 12 hours  aspirin enteric coated 81 milliGRAM(s) Oral daily  atorvastatin 40 milliGRAM(s) Oral at bedtime  gabapentin 100 milliGRAM(s) Oral three times a day  influenza   Vaccine 0.5 milliLiter(s) IntraMuscular once  metoprolol succinate  milliGRAM(s) Oral daily  multivitamin 1 Tablet(s) Oral daily  spironolactone 25 milliGRAM(s) Oral daily    MEDICATIONS  (PRN):  acetaminophen     Tablet .. 650 milliGRAM(s) Oral every 6 hours PRN Temp greater or equal to 38C (100.4F), Mild Pain (1 - 3)  aluminum hydroxide/magnesium hydroxide/simethicone Suspension 30 milliLiter(s) Oral every 4 hours PRN Dyspepsia  melatonin 3 milliGRAM(s) Oral at bedtime PRN Insomnia  ondansetron Injectable 4 milliGRAM(s) IV Push every 8 hours PRN Nausea and/or Vomiting        I&O's Summary    13 Jan 2022 07:01  -  14 Jan 2022 07:00  --------------------------------------------------------  IN: 1741.4 mL / OUT: 1850 mL / NET: -108.6 mL    14 Jan 2022 07:01  -  14 Jan 2022 11:23  --------------------------------------------------------  IN: 240 mL / OUT: 200 mL / NET: 40 mL        PHYSICAL EXAM:  Vital Signs Last 24 Hrs  T(C): 36.8 (14 Jan 2022 08:22), Max: 36.9 (13 Jan 2022 21:48)  T(F): 98.2 (14 Jan 2022 08:22), Max: 98.5 (13 Jan 2022 21:48)  HR: 67 (14 Jan 2022 08:22) (67 - 75)  BP: 97/70 (14 Jan 2022 08:22) (97/70 - 119/84)  BP(mean): --  RR: 18 (14 Jan 2022 08:22) (18 - 18)  SpO2: 96% (14 Jan 2022 08:22) (94% - 96%)      CONSTITUTIONAL: NAD  ENMT: Moist oral mucosa, no pharyngeal injection or exudates  RESPIRATORY: Normal respiratory effort; lungs are clear to auscultation bilaterally  CARDIOVASCULAR: Regular rate and rhythm, normal S1 and S2, 1 + lower extremity edema  ABDOMEN: Nontender to palpation, normoactive bowel sounds, no rebound/guarding;   MUSCLOSKELETAL:  no clubbing or cyanosis of digits; no joint swelling or tenderness to palpation  PSYCH: A+O to person, place, and time; affect appropriate  NEUROLOGY: CN 2-12 are intact and symmetric; no gross sensory deficits;   SKIN: No rashes; no palpable lesions       LABS:                        13.4   5.54  )-----------( 301      ( 14 Jan 2022 06:45 )             42.6     01-14    134<L>  |  89<L>  |  80.9<H>  ----------------------------<  130<H>  4.3   |  27.0  |  1.65<H>    Ca    9.2      14 Jan 2022 06:45  Mg     2.4     01-14    TPro  7.3  /  Alb  3.8  /  TBili  1.0  /  DBili  x   /  AST  51<H>  /  ALT  44<H>  /  AlkPhos  387<H>  01-14              CAPILLARY BLOOD GLUCOSE            RADIOLOGY & ADDITIONAL TESTS:  Results Reviewed:   Imaging Personally Reviewed:  Electrocardiogram Personally Reviewed:

## 2022-01-14 NOTE — PROGRESS NOTE ADULT - TIME BILLING
- Review of notes/records, telemetry, vital signs and daily labs.   - General and cardiovascular physical examination.  - Generation of cardiovascular treatment plan.  - Coordination of care with primary team.
- Review of notes/records, telemetry, vital signs and daily labs.   - General and cardiovascular physical examination.  - Generation of cardiovascular treatment plan.  - Coordination of care with primary team.
as above.
- Review of notes/records, telemetry, vital signs and daily labs.   - General and cardiovascular physical examination.  - Generation of cardiovascular treatment plan.  - Coordination of care with primary team.
- Review of notes/records, telemetry, vital signs and daily labs.   - General and cardiovascular physical examination.  - Generation of cardiovascular treatment plan.  - Coordination of care with primary team.
ACC/AHD stage D acute on chronic HFrEF, pAflutter
Closer to euvolemia. Total I/O since admission -16 liters.   Labs with some DERRICK and contraction alkalosis. Diuretic holiday today.  Probable DC over the weekend.
- Review of notes/records, telemetry, vital signs and daily labs.   - General and cardiovascular physical examination.  - Generation of cardiovascular treatment plan.  - Coordination of care with primary team.
- Review of notes/records, telemetry, vital signs and daily labs.   - General and cardiovascular physical examination.  - Generation of cardiovascular treatment plan.  - Coordination of care with primary team.
ACC/AHA stage D HFrEF, pAflutter
- Review of notes/records, telemetry, vital signs and daily labs.   - General and cardiovascular physical examination.  - Generation of cardiovascular treatment plan.  - Coordination of care with primary team.

## 2022-01-14 NOTE — PROGRESS NOTE ADULT - PROBLEM SELECTOR PROBLEM 1
Acute on chronic systolic heart failure, NYHA class 3

## 2022-01-14 NOTE — PROGRESS NOTE ADULT - PROBLEM SELECTOR PLAN 2
Cardiorenal component  Monitor closely with diuresis  Avoid nephrotoxics Cardiorenal component  sCr 1.74 yesterday, 1.65 today   Monitor closely   Avoid nephrotoxics

## 2022-01-14 NOTE — PROGRESS NOTE ADULT - PROBLEM SELECTOR PLAN 4
s/p VANESSA cardioversion 11/22/21  Remains in SR  On Amiodarone 200mg PO daily  AC: Eliquis.

## 2022-01-14 NOTE — PROGRESS NOTE ADULT - SUBJECTIVE AND OBJECTIVE BOX
NOTE INCOMPLETE    [facility] Advanced Heart Failure  Office: [address] [place] [address]  Telephone: [phone]/Fax: [phone]    Subjective:    Medications:  acetaminophen     Tablet .. 650 milliGRAM(s) Oral every 6 hours PRN  aluminum hydroxide/magnesium hydroxide/simethicone Suspension 30 milliLiter(s) Oral every 4 hours PRN  aMIOdarone    Tablet 200 milliGRAM(s) Oral daily  apixaban 5 milliGRAM(s) Oral every 12 hours  aspirin enteric coated 81 milliGRAM(s) Oral daily  atorvastatin 40 milliGRAM(s) Oral at bedtime  gabapentin 100 milliGRAM(s) Oral three times a day  influenza   Vaccine 0.5 milliLiter(s) IntraMuscular once  melatonin 3 milliGRAM(s) Oral at bedtime PRN  metoprolol succinate  milliGRAM(s) Oral daily  milrinone Infusion 0.125 MICROgram(s)/kG/Min IV Continuous <Continuous>  multivitamin 1 Tablet(s) Oral daily  ondansetron Injectable 4 milliGRAM(s) IV Push every 8 hours PRN  spironolactone 25 milliGRAM(s) Oral daily    Vital Signs Last 24 Hours  T(C): 36.8 (22 @ 08:22), Max: 36.9 (22 @ 21:48)  HR: 67 (22 @ 08:22) (67 - 75)  BP: 97/70 (22 @ 08:22) (97/70 - 119/84)  RR: 18 (22 @ 08:22) (18 - 18)  SpO2: 96% (22 @ 08:22) (94% - 96%)    Weight in k.4 ( @ 04:28)    I&O's Summary    2022 07:01  -  2022 07:00  --------------------------------------------------------  IN: 1741.4 mL / OUT: 1850 mL / NET: -108.6 mL    Tele:    Physical Exam:  General: No distress. Comfortable.  HEENT: EOM intact.  Neck: Neck supple. JVP not elevated. No masses  Chest: Clear to auscultation bilaterally  CV: Normal S1 and S2. No murmurs, rub, or gallops. Radial pulses normal.  Abdomen: Soft, non-distended, non-tender  Skin: No rashes or skin breakdown  Neurology: Alert and oriented times three. Sensation intact  Psych: Affect normal    Labs:                        13.4   5.54  )-----------( 301      ( 2022 06:45 )             42.6         134<L>  |  89<L>  |  80.9<H>  ----------------------------<  130<H>  4.3   |  27.0  |  1.65<H>    Ca    9.2      2022 06:45  Mg     2.4         TPro  7.3  /  Alb  3.8  /  TBili  1.0  /  DBili  x   /  AST  51<H>  /  ALT  44<H>  /  AlkPhos  387<H>                         [facility] Advanced Heart Failure  Office: [address] [place] [address]  Telephone: [phone]/Fax: [phone]    Subjective: NAEO. Pt sitting up in chair. Reports feeling generally well.    Medications:  acetaminophen     Tablet .. 650 milliGRAM(s) Oral every 6 hours PRN  aluminum hydroxide/magnesium hydroxide/simethicone Suspension 30 milliLiter(s) Oral every 4 hours PRN  aMIOdarone    Tablet 200 milliGRAM(s) Oral daily  apixaban 5 milliGRAM(s) Oral every 12 hours  aspirin enteric coated 81 milliGRAM(s) Oral daily  atorvastatin 40 milliGRAM(s) Oral at bedtime  gabapentin 100 milliGRAM(s) Oral three times a day  influenza   Vaccine 0.5 milliLiter(s) IntraMuscular once  melatonin 3 milliGRAM(s) Oral at bedtime PRN  metoprolol succinate  milliGRAM(s) Oral daily  milrinone Infusion 0.125 MICROgram(s)/kG/Min IV Continuous <Continuous>  multivitamin 1 Tablet(s) Oral daily  ondansetron Injectable 4 milliGRAM(s) IV Push every 8 hours PRN  spironolactone 25 milliGRAM(s) Oral daily    Vital Signs Last 24 Hours  T(C): 36.8 (22 @ 08:22), Max: 36.9 (22 @ 21:48)  HR: 67 (22 @ 08:22) (67 - 75)  BP: 97/70 (22 @ 08:22) (97/70 - 119/84)  RR: 18 (22 @ 08:22) (18 - 18)  SpO2: 96% (22 @ 08:22) (94% - 96%)    Weight in k.4 ( @ 04:28)    I&O's Summary    2022 07:01  -  2022 07:00  --------------------------------------------------------  IN: 1741.4 mL / OUT: 1850 mL / NET: -108.6 mL    Tele: Sinus rhythm    Physical Exam:  General: No distress. Comfortable.  HEENT: EOMs intact. Sclera non-icteric   Neck: JVP ~9cm H20  Chest: RLL diminished  CV: RRR. Normal S1 and S2. No murmurs, rub, or gallops. Lukewarm peripherally.  LE: Trace B/L  LE edema - significantly improved  Abdomen: Soft  Skin: dry, intact  Neurology: Alert and oriented times three. Sensation intact  Psych: appears frustrated    Labs:                        13.4   5.54  )-----------( 301      ( 2022 06:45 )             42.6         134<L>  |  89<L>  |  80.9<H>  ----------------------------<  130<H>  4.3   |  27.0  |  1.65<H>    Ca    9.2      2022 06:45  Mg     2.4         TPro  7.3  /  Alb  3.8  /  TBili  1.0  /  DBili  x   /  AST  51<H>  /  ALT  44<H>  /  AlkPhos  387<H>

## 2022-01-14 NOTE — PROGRESS NOTE ADULT - ATTENDING COMMENTS
Clinically close to euvolemia, lukewarm extremities.   Will add PO diuretics tomorrow.   Medicine team plans to DC to rehab over the weekend. Recommend SW/CM assistance with dispo as patient is homeless.   High risk of readmission.   OK to DC home over the weekend if creatinine remains stable and pt has good urine output on oral diuretics.

## 2022-01-14 NOTE — PROGRESS NOTE ADULT - PROBLEM SELECTOR PLAN 1
ACC/AHA stage C, NICMP (LVEF <20%, LVIDd 6.96cm)  Clinically close to euvolemia, cool extremities.    Inotropic support: Begin to wean. Reduce Milrinone gtt  to 0.125 mcg/kg/min  Monitor markers of perfusion daily including lactate, LFTs  Diuretics: Start Torsemide 60mg PO daily   1.5L Fluid restriction, strict I&O and daily STANDING weights.  GDMT: Continue Toprol-XL 100mg daily and aldactone 25mg daily. Plan to optimize w/ ACEi/ARB/ARNi once euvolemic. SGLT2i as outpt.  Device: s/p ICD   Not candidate for advanced therapies due to smoking/cocaine use history and non-compliance. ACC/AHA stage C, NICMP (LVEF <20%, LVIDd 6.96cm)  Clinically close to euvolemia, lukewarm extremities.    Inotropic support: Discontinue Milrinone infusion. Likely inotrope dependent but not candidate for home inotropes given non-compliance.  Monitor markers of perfusion daily including lactate, LFTs  Diuretics: Diuretic holiday today. sCr improving. Plan to start Torsemide 40mg PO daily tomorrow.   Repeat CXR for comparison post aggressive diuresis (h/o right pleural effusion).  1.5L Fluid restriction, strict I&O and daily STANDING weights.  GDMT: Continue Toprol-XL 100mg daily and aldactone 25mg daily. Will eventually need ACEi/ARB/ARNi and SGLT2i as outpt.  Device: s/p ICD   Not candidate for advanced therapies due to smoking/cocaine use history and non-compliance.

## 2022-01-14 NOTE — PROGRESS NOTE ADULT - ASSESSMENT
62 y/o with h/o chronic systolic HF ACC/AHA stage C, NICMP LVEF <20% LVIDd 6.96cm, nonobstructive CAD, s/p ICD, HTN, active tobacco use, ?COPD who presented with decompensated HF. He reports being compliant with medications but not so much with low Na diet due to living in a group home.   Of note, the patient was recently admitted for acute decompensated HF in the setting of aflutter w/ RVR. He underwent successful VANESSA/DCCV on 11/22/21. This is his 4th hospitalization in the past year. He was started on bumex/milrinone gtt with moderate response.     Cards: Alison ([facility])    Pertinent Cardiac Studies  11/18/21 EKG Aflutter LAD. PRWP. NS T wave changes    11/20/21 LVEF <20% LVIDd 6.96cm VTI 5cm, RV severely enlarged with severely reduced systolic HF, sev biatrial enlargement, mod-sev MR, moderate TR, RVSP 38mmHg    11/22/21 VANESSA limited study, LVEF <20%, no FADUMO thrombus    2016 [facility] LM minor irreg, mid LAD 50%, LCx minor irreg, RCA prox 60% mid 85%

## 2022-01-15 LAB
ALBUMIN SERPL ELPH-MCNC: 3.8 G/DL — SIGNIFICANT CHANGE UP (ref 3.3–5.2)
ALP SERPL-CCNC: 342 U/L — HIGH (ref 40–120)
ALT FLD-CCNC: 38 U/L — SIGNIFICANT CHANGE UP
ANION GAP SERPL CALC-SCNC: 12 MMOL/L — SIGNIFICANT CHANGE UP (ref 5–17)
AST SERPL-CCNC: 42 U/L — HIGH
BILIRUB SERPL-MCNC: 1 MG/DL — SIGNIFICANT CHANGE UP (ref 0.4–2)
BUN SERPL-MCNC: 74.8 MG/DL — HIGH (ref 8–20)
CALCIUM SERPL-MCNC: 8.8 MG/DL — SIGNIFICANT CHANGE UP (ref 8.6–10.2)
CHLORIDE SERPL-SCNC: 88 MMOL/L — LOW (ref 98–107)
CO2 SERPL-SCNC: 27 MMOL/L — SIGNIFICANT CHANGE UP (ref 22–29)
CREAT SERPL-MCNC: 1.44 MG/DL — HIGH (ref 0.5–1.3)
GLUCOSE SERPL-MCNC: 106 MG/DL — HIGH (ref 70–99)
HCT VFR BLD CALC: 43.5 % — SIGNIFICANT CHANGE UP (ref 39–50)
HGB BLD-MCNC: 13.8 G/DL — SIGNIFICANT CHANGE UP (ref 13–17)
MAGNESIUM SERPL-MCNC: 2.6 MG/DL — SIGNIFICANT CHANGE UP (ref 1.8–2.6)
MCHC RBC-ENTMCNC: 27.4 PG — SIGNIFICANT CHANGE UP (ref 27–34)
MCHC RBC-ENTMCNC: 31.7 GM/DL — LOW (ref 32–36)
MCV RBC AUTO: 86.3 FL — SIGNIFICANT CHANGE UP (ref 80–100)
PLATELET # BLD AUTO: 289 K/UL — SIGNIFICANT CHANGE UP (ref 150–400)
POTASSIUM SERPL-MCNC: 4.3 MMOL/L — SIGNIFICANT CHANGE UP (ref 3.5–5.3)
POTASSIUM SERPL-SCNC: 4.3 MMOL/L — SIGNIFICANT CHANGE UP (ref 3.5–5.3)
PROT SERPL-MCNC: 6.9 G/DL — SIGNIFICANT CHANGE UP (ref 6.6–8.7)
RBC # BLD: 5.04 M/UL — SIGNIFICANT CHANGE UP (ref 4.2–5.8)
RBC # FLD: 16.9 % — HIGH (ref 10.3–14.5)
SODIUM SERPL-SCNC: 127 MMOL/L — LOW (ref 135–145)
WBC # BLD: 5.54 K/UL — SIGNIFICANT CHANGE UP (ref 3.8–10.5)
WBC # FLD AUTO: 5.54 K/UL — SIGNIFICANT CHANGE UP (ref 3.8–10.5)

## 2022-01-15 PROCEDURE — 99232 SBSQ HOSP IP/OBS MODERATE 35: CPT

## 2022-01-15 RX ORDER — FUROSEMIDE 40 MG
60 TABLET ORAL ONCE
Refills: 0 | Status: COMPLETED | OUTPATIENT
Start: 2022-01-15 | End: 2022-01-15

## 2022-01-15 RX ORDER — IPRATROPIUM/ALBUTEROL SULFATE 18-103MCG
3 AEROSOL WITH ADAPTER (GRAM) INHALATION EVERY 6 HOURS
Refills: 0 | Status: DISCONTINUED | OUTPATIENT
Start: 2022-01-15 | End: 2022-01-01

## 2022-01-15 RX ADMIN — SPIRONOLACTONE 25 MILLIGRAM(S): 25 TABLET, FILM COATED ORAL at 05:53

## 2022-01-15 RX ADMIN — APIXABAN 5 MILLIGRAM(S): 2.5 TABLET, FILM COATED ORAL at 05:53

## 2022-01-15 RX ADMIN — Medication 81 MILLIGRAM(S): at 13:04

## 2022-01-15 RX ADMIN — Medication 100 MILLIGRAM(S): at 05:53

## 2022-01-15 RX ADMIN — APIXABAN 5 MILLIGRAM(S): 2.5 TABLET, FILM COATED ORAL at 16:53

## 2022-01-15 RX ADMIN — Medication 3 MILLILITER(S): at 22:05

## 2022-01-15 RX ADMIN — GABAPENTIN 100 MILLIGRAM(S): 400 CAPSULE ORAL at 05:53

## 2022-01-15 RX ADMIN — GABAPENTIN 100 MILLIGRAM(S): 400 CAPSULE ORAL at 13:04

## 2022-01-15 RX ADMIN — ATORVASTATIN CALCIUM 40 MILLIGRAM(S): 80 TABLET, FILM COATED ORAL at 21:24

## 2022-01-15 RX ADMIN — Medication 650 MILLIGRAM(S): at 20:40

## 2022-01-15 RX ADMIN — AMIODARONE HYDROCHLORIDE 200 MILLIGRAM(S): 400 TABLET ORAL at 06:37

## 2022-01-15 RX ADMIN — Medication 60 MILLIGRAM(S): at 16:53

## 2022-01-15 RX ADMIN — Medication 1 TABLET(S): at 13:05

## 2022-01-15 RX ADMIN — GABAPENTIN 100 MILLIGRAM(S): 400 CAPSULE ORAL at 21:24

## 2022-01-15 RX ADMIN — Medication 650 MILLIGRAM(S): at 19:38

## 2022-01-15 RX ADMIN — Medication 3 MILLILITER(S): at 16:25

## 2022-01-15 NOTE — PROGRESS NOTE ADULT - ASSESSMENT
61 yr old male with CAD, dilated cardiomyopathy, s/p ICD, atrial flutter on Eliquis, substance abuse was admitted initially from 12/24 for decompensated CHF, was on Milrinone and Bumex drips, left AMA on 12/31 and returned to the ED later in the day as he had to take care of personal matter. He reported shortness of breath and right arm and foot pain on presentation. Cardiology was consulted, he was placed on oral Bumex., subsequently transitioned to Bumex gtt. Patient reported he was recently evicted from his apartment and is homeless, social work consult placed. Seen by PT and recommend  CHAPINCITO    1-Decompensated HFrEF s/p ICD:   improving  s/p iv bumex infusion   Stopped  Milrinone 1/14    Start Torsemdie 40mg daily today    daily weight and strict I/O  Continue statin, metoprolol  monitor lytes and renal function  counseling given re diet and medication compliance   Not candidate for advanced therapies due to smoking/cocaine use history and non-compliance.    2- Attrial flutter:  Continue Amiodarone,  Metoprolol, Eliquis.  controlled     3-DM type 2 , no on insulin at home     HGA1c 6.2 in November,   blood sugar tolerable on current diet,     4-DERRICK and CRF   Monitor renal function on Aldactone and torsemide    5- h/o smoking stopped 1 year ago   COPD suspected   will start neb therapy   6- Moderate protein spencer malnutrition   cont diet add glucerna    DVT prophylaxis: On Eliquis.  Dispo: DC planing to rehab Sunday or Monday. TRACE on board

## 2022-01-15 NOTE — PROGRESS NOTE ADULT - SUBJECTIVE AND OBJECTIVE BOX
[facility]  [facility]ACT[facility]  [address] [id number]    REASON FOR VISIT:  Follow up on chf  UPDATE:  TELEMETRY:  I 360   0 600          01-15    127<L>  |  88<L>  |  74.8<H>  ----------------------------<  106<H>  4.3   |  27.0  |  1.44<H>    Ca    8.8      15 David 2022 06:37  Mg     2.6     01-15    TPro  6.9  /  Alb  3.8  /  TBili  1.0  /  DBili  x   /  AST  42<H>  /  ALT  38  /  AlkPhos  342<H>  01-15    LIVER FUNCTIONS - ( 15 David 2022 06:37 )  Alb: 3.8 g/dL / Pro: 6.9 g/dL / ALK PHOS: 342 U/L / ALT: 38 U/L / AST: 42 U/L / GGT: x             MEDICATIONS  (STANDING):  albuterol/ipratropium for Nebulization 3 milliLiter(s) Nebulizer every 6 hours  aMIOdarone    Tablet 200 milliGRAM(s) Oral daily  apixaban 5 milliGRAM(s) Oral every 12 hours  aspirin enteric coated 81 milliGRAM(s) Oral daily  atorvastatin 40 milliGRAM(s) Oral at bedtime  gabapentin 100 milliGRAM(s) Oral three times a day  influenza   Vaccine 0.5 milliLiter(s) IntraMuscular once  metoprolol succinate  milliGRAM(s) Oral daily  multivitamin 1 Tablet(s) Oral daily  spironolactone 25 milliGRAM(s) Oral daily  torsemide 40 milliGRAM(s) Oral daily    ROS:    No fever chills  Cardiac  No cp sob or palp  Resp  no cough no mucus production  Gi  no abd pain no melana  Ext No calf tenderness, no edema    Vital Signs Last 24 Hrs  T(C): 36.7 (15 David 2022 10:00), Max: 37.1 (14 Jan 2022 22:29)  T(F): 98.1 (15 David 2022 10:00), Max: 98.7 (14 Jan 2022 22:29)  HR: 77 (15 David 2022 10:00) (76 - 78)  BP: 113/82 (15 David 2022 10:00) (109/75 - 113/82)  BP(mean): --  RR: 18 (15 David 2022 10:00) (18 - 18)  SpO2: 94% (15 David 2022 10:00) (94% - 95%)  T(C): 36.7 (01-15-22 @ 10:00), Max: 37.1 (01-14-22 @ 22:29)  HR: 77 (01-15-22 @ 10:00) (76 - 78)  BP: 113/82 (01-15-22 @ 10:00) (109/75 - 113/82)  RR: 18 (01-15-22 @ 10:00) (18 - 18)  SpO2: 94% (01-15-22 @ 10:00) (94% - 95%)    HEENT Head atraumatic eomi, oral mucosa moist  CV S1&S2      No r/g/ m   RESP    GI  Soft active bowel sounds non tender  EXT  No clubbing/Cyanosis /Edema  NEURO  Alert oriented  No gross motor or sensory deficits       [facility]  [facility]  [address], [place] [address]    REASON FOR VISIT:  Follow up on chf  UPDATE:  I cant get air  i feel sob  was on a diuretic holiday and he did not restart diuretic this am due to scheduling error  TELEMETRY:  vvi pacing  I 360   0 600      01-15    127<L>  |  88<L>  |  74.8<H>  ----------------------------<  106<H>  4.3   |  27.0  |  1.44<H>    Ca    8.8      15 David 2022 06:37  Mg     2.6     01-15    TPro  6.9  /  Alb  3.8  /  TBili  1.0  /  DBili  x   /  AST  42<H>  /  ALT  38  /  AlkPhos  342<H>  01-15    LIVER FUNCTIONS - ( 15 David 2022 06:37 )  Alb: 3.8 g/dL / Pro: 6.9 g/dL / ALK PHOS: 342 U/L / ALT: 38 U/L / AST: 42 U/L / GGT: x             MEDICATIONS  (STANDING):  albuterol/ipratropium for Nebulization 3 milliLiter(s) Nebulizer every 6 hours  aMIOdarone    Tablet 200 milliGRAM(s) Oral daily  apixaban 5 milliGRAM(s) Oral every 12 hours  aspirin enteric coated 81 milliGRAM(s) Oral daily  atorvastatin 40 milliGRAM(s) Oral at bedtime  gabapentin 100 milliGRAM(s) Oral three times a day  influenza   Vaccine 0.5 milliLiter(s) IntraMuscular once  metoprolol succinate  milliGRAM(s) Oral daily  multivitamin 1 Tablet(s) Oral daily  spironolactone 25 milliGRAM(s) Oral daily  torsemide 40 milliGRAM(s) Oral daily    ROS:    No fever chills  Cardiac  No cp sob or palp  Resp  no cough no mucus production  Gi  no abd pain no melana  Ext No calf tenderness, no edema    Vital Signs Last 24 Hrs  T(C): 36.7 (15 David 2022 10:00), Max: 37.1 (2022 22:29)  T(F): 98.1 (15 David 2022 10:00), Max: 98.7 (2022 22:29)  HR: 77 (15 David 2022 10:00) (76 - 78)  BP: 113/82 (15 David 2022 10:00) (109/75 - 113/82)  BP(mean): --  RR: 18 (15 David 2022 10:00) (18 - 18)  SpO2: 94% (15 David 2022 10:00) (94% - 95%)  T(C): 36.7 (01-15-22 @ 10:00), Max: 37.1 (22 @ 22:29)  HR: 77 (01-15-22 @ 10:00) (76 - 78)  BP: 113/82 (01-15-22 @ 10:00) (109/75 - 113/82)  RR: 18 (01-15-22 @ 10:00) (18 - 18)  SpO2: 94% (01-15-22 @ 10:00) (94% - 95%)    HEENT Head atraumatic eomi, oral mucosa moist  CV S1&S2      No r/g/ m   RESP  ins/exp wheeze  GI  Soft active bowel sounds non tender  EXT  No clubbing/Cyanosis /trace Edema  NEURO  Alert oriented  No gross motor or sensory deficits    < from: TTE Echo Complete w/o Contrast w/ Doppler (21 @ 08:33) >  Summary:   1. Left ventricular ejection fraction, by visual estimation, is <20%.   2. Technically good study.   3. Severely enlarged left atrium.   4. Severely enlarged right atrium.   5. Severely enlarged right ventricle.   6. Severely reduced RV systolic function.   7. Moderate to severe mitral valve regurgitation.   8. Thickening of the anterior mitral valve leaflet.   9. Moderate tricuspid regurgitation.  10. Estimated pulmonary artery systolic pressure is 37.8 mmHg assuming a right atrial pressure of 8 mmHg, which is consistent with borderline pulmonary hypertension.    < end of copied text >  < from: Cardiac Cath Lab - Adult (16 @ 12:51) >  CORONARY VESSELS: The coronary circulation is right dominant.  LM:   --  LM: Angiography showed minor luminal irregularities with no flow  limiting lesions.  LAD:   --  Mid LAD: There was a 50 % stenosis.  CX:   --  Circumflex: Angiography showed minor luminal irregularities with  no flow limiting lesions.  RCA:   --  Proximal RCA: There was a 60 % stenosis.  --  Mid RCA: There was a 85 % stenosis.  COMPLICATIONS: There were no complications.  DIAGNOSTIC RECOMMENDATIONS: The patient should continue with the present  medications.  Prepared and signed by  Tahir Wilks M.D.  Signed 2016 15:44:49  HEMODYNAMIC TABLES  Pressures:  Baseline/ Room Air  Pressures:  - HR: 94  Pressures:  - Rhythm:  Pressures:  -- Left Ventricle (s/edp): 107/39/--  Pressures:  -- Pulmonary Artery (S/D/M): 58/29/40  Pressures:  -- Right Atrium (a/v/M): 29/28/26  Pressures:  -- Right Ventricle (s/edp): 56/25/--  O2 Sats:  Baseline/ Room Air  O2 Sats:  - HR: 94  O2 Sats:  - Rhythm:  O2 Sats:  -- AO: 13.1/95/16.93  O2 Sats:  -- PA: 13.1/35.5/6.32  Outputs:  Baseline/ Room Air  Outputs:  -- CALCULATIONS[age]  Outputs:  -- CALCULATIONS: Body Surface Area: 2.01  Outputs:  -- CALCULATIONS: Height in cm: 183.00  Outputs:  -- CALCULATIONS: Sex: Male  Outputs:  -- CALCULATIONS: Weight in k.40  Outputs:  -- OUTPUTS:Blood Oxygen Difference: 10.60  Outputs:  -- OUTPUTS: CO by Nahid: 2.54  Outputs:  -- OUTPUTS: Nahid cardiac index: 1.26  Outputs:  -- OUTPUTS: O2 consumption: 269.00  Outputs:  -- OUTPUTS: Vo2 Indexed: 133.56  Outputs:  -- RESISTANCES: Right ventricular stroke work: 5.89  Outputs:  -- RESISTANCES: Right ventricular stroke work index: 2.93  Outputs:  -- RESISTANCES: Total pulmonary index (dsc): 2539.12  Outputs:  -- RESISTANCES: Total pulmonary index (Wood Units): 31.75  Outputs:  -- RESISTANCES: Total pulmonary resistance (dsc): 1260.71  Outputs:  -- RESISTANCES: Total pulmonary resistance (Wood Units): 15.76    < end of copied text >

## 2022-01-15 NOTE — PROGRESS NOTE ADULT - ASSESSMENT
62 y/o with h/o chronic systolic HF ACC/AHA stage C, NICMP LVEF <20% LVIDd 6.96cm, nonobstructive CAD, s/p ICD, HTN, active tobacco use, ?COPD who presented with decompensated HF. He reports being compliant with medications but not so much with low Na diet due to living in a group home. Of note, the patient was recently admitted for acute decompensated HF in the setting of aflutter w/ RVR. He underwent successful VANESSA/DCCV on 11/22/21. This is his 4th hospitalization in the past year. He was started on bumex/milrinone gtt with good response. S.P A drug holiday now starting back on demaex today              CARDIAC MEDS  aMIOdarone    Tablet 200 milliGRAM(s) Oral daily  apixaban 5 milliGRAM(s) Oral every 12 hours  aspirin enteric coated 81 milliGRAM(s) Oral daily  atorvastatin 40 milliGRAM(s) Oral at bedtime  metoprolol succinate  milliGRAM(s) Oral daily  spironolactone 25 milliGRAM(s) Oral daily  torsemide 40 milliGRAM(s) Oral daily 60 y/o with h/o chronic systolic HF ACC/AHA stage C, NICMP LVEF <20% LVIDd 6.96cm, nonobstructive CAD, s/p ICD, HTN, active tobacco use, ?COPD who presented with decompensated HF. He reports being compliant with medications but not so much with low Na diet due to living in a group home. Of note, the patient was recently admitted for acute decompensated HF in the setting of aflutter w/ RVR. He underwent successful VANESSA/DCCV on 11/22/21. This is his 4th hospitalization in the past year. He was started on bumex/milrinone gtt with good response. S.P A drug holiday now starting back on Demadex today but he is dyspneic     DECOMPENSATED CHF  IV Lasix now  restart torsemide in the am  keep I > O  Spirolactone  s/p milrinone   ACEi/ARB/ARNi once euvolemic. SGLT2i as outpt.  Fluid restriction  Metoprolol 100 qd    ATRIAL FLUTTER  S/P CARDIOVERSION 11/22/21  Eliquis  Aminodarone    HLD    Lipitor    CARDIAC MEDS  aMIOdarone    Tablet 200 milliGRAM(s) Oral daily  apixaban 5 milliGRAM(s) Oral every 12 hours  aspirin enteric coated 81 milliGRAM(s) Oral daily  atorvastatin 40 milliGRAM(s) Oral at bedtime  metoprolol succinate  milliGRAM(s) Oral daily  spironolactone 25 milliGRAM(s) Oral daily  torsemide 40 milliGRAM(s) Oral daily

## 2022-01-15 NOTE — PROGRESS NOTE ADULT - SUBJECTIVE AND OBJECTIVE BOX
Pt is seen for  CHF , dsypnea   chart reviewed   he is seen this am   c/w SOB on minimal  exertion , no CP         Patient denies chest pain, SOB, abd pain, N/V, fever, chills, dysuria or any other complaints. All remainder ROS negative.       MEDICATIONS  (STANDING):  aMIOdarone    Tablet 200 milliGRAM(s) Oral daily  apixaban 5 milliGRAM(s) Oral every 12 hours  aspirin enteric coated 81 milliGRAM(s) Oral daily  atorvastatin 40 milliGRAM(s) Oral at bedtime  gabapentin 100 milliGRAM(s) Oral three times a day  influenza   Vaccine 0.5 milliLiter(s) IntraMuscular once  metoprolol succinate  milliGRAM(s) Oral daily  multivitamin 1 Tablet(s) Oral daily  spironolactone 25 milliGRAM(s) Oral daily  torsemide 40 milliGRAM(s) Oral daily      PHYSICAL EXAM:  Vital Signs Last 24 Hrs  T(C): 36.8 (14 Jan 2022 08:22), Max: 36.9 (13 Jan 2022 21:48)  T(F): 98.2 (14 Jan 2022 08:22), Max: 98.5 (13 Jan 2022 21:48)  HR: 67 (14 Jan 2022 08:22) (67 - 75)  BP: 97/70 (14 Jan 2022 08:22) (97/70 - 119/84)  BP(mean): --  RR: 18 (14 Jan 2022 08:22) (18 - 18)  SpO2: 96% (14 Jan 2022 08:22) (94% - 96%)      CONSTITUTIONAL: NAD  ENMT: Moist oral mucosa, no pharyngeal injection or exudates  RESPIRATORY: Normal respiratory effort; lungs are clear to auscultation bilaterally  CARDIOVASCULAR: Regular rate and rhythm, normal S1 and S2  ABDOMEN: Nontender to palpation, normoactive bowel sounds, no rebound/guarding;   MUSCLOSKELETAL: no pretibial edema ,  no clubbing or cyanosis of digits; no joint swelling or tenderness to palpation  PSYCH: A+O to person, place, and time; affect appropriate  NEUROLOGY: CN 2-12 are intact and symmetric; no gross sensory deficits;          LABS:                        13.4   5.54  )-----------( 301      ( 14 Jan 2022 06:45 )             42.6     01-14    134<L>  |  89<L>  |  80.9<H>  ----------------------------<  130<H>  4.3   |  27.0  |  1.65<H>    Ca    9.2      14 Jan 2022 06:45  Mg     2.4     01-14    TPro  7.3  /  Alb  3.8  /  TBili  1.0  /  DBili  x   /  AST  51<H>  /  ALT  44<H>  /  AlkPhos  387<H>  01-14              CAPILLARY BLOOD GLUCOSE

## 2022-01-15 NOTE — PROGRESS NOTE ADULT - ATTENDING COMMENTS
pt seen and examined  NICM, getting more short of breath, received IV diuretics as well  Cont with HF meds  I/Os.

## 2022-01-16 LAB
ALBUMIN SERPL ELPH-MCNC: 3.8 G/DL — SIGNIFICANT CHANGE UP (ref 3.3–5.2)
ALP SERPL-CCNC: 323 U/L — HIGH (ref 40–120)
ALT FLD-CCNC: 34 U/L — SIGNIFICANT CHANGE UP
ANION GAP SERPL CALC-SCNC: 16 MMOL/L — SIGNIFICANT CHANGE UP (ref 5–17)
AST SERPL-CCNC: 34 U/L — SIGNIFICANT CHANGE UP
BILIRUB SERPL-MCNC: 0.9 MG/DL — SIGNIFICANT CHANGE UP (ref 0.4–2)
BUN SERPL-MCNC: 80.7 MG/DL — HIGH (ref 8–20)
CALCIUM SERPL-MCNC: 8.9 MG/DL — SIGNIFICANT CHANGE UP (ref 8.6–10.2)
CHLORIDE SERPL-SCNC: 88 MMOL/L — LOW (ref 98–107)
CO2 SERPL-SCNC: 26 MMOL/L — SIGNIFICANT CHANGE UP (ref 22–29)
CREAT SERPL-MCNC: 1.65 MG/DL — HIGH (ref 0.5–1.3)
GLUCOSE SERPL-MCNC: 109 MG/DL — HIGH (ref 70–99)
HCT VFR BLD CALC: 44.8 % — SIGNIFICANT CHANGE UP (ref 39–50)
HGB BLD-MCNC: 13.9 G/DL — SIGNIFICANT CHANGE UP (ref 13–17)
MCHC RBC-ENTMCNC: 26.8 PG — LOW (ref 27–34)
MCHC RBC-ENTMCNC: 31 GM/DL — LOW (ref 32–36)
MCV RBC AUTO: 86.5 FL — SIGNIFICANT CHANGE UP (ref 80–100)
NT-PROBNP SERPL-SCNC: HIGH PG/ML (ref 0–300)
PLATELET # BLD AUTO: 346 K/UL — SIGNIFICANT CHANGE UP (ref 150–400)
POTASSIUM SERPL-MCNC: 4.3 MMOL/L — SIGNIFICANT CHANGE UP (ref 3.5–5.3)
POTASSIUM SERPL-SCNC: 4.3 MMOL/L — SIGNIFICANT CHANGE UP (ref 3.5–5.3)
PROT SERPL-MCNC: 6.8 G/DL — SIGNIFICANT CHANGE UP (ref 6.6–8.7)
RBC # BLD: 5.18 M/UL — SIGNIFICANT CHANGE UP (ref 4.2–5.8)
RBC # FLD: 16.8 % — HIGH (ref 10.3–14.5)
SODIUM SERPL-SCNC: 130 MMOL/L — LOW (ref 135–145)
WBC # BLD: 6.26 K/UL — SIGNIFICANT CHANGE UP (ref 3.8–10.5)
WBC # FLD AUTO: 6.26 K/UL — SIGNIFICANT CHANGE UP (ref 3.8–10.5)

## 2022-01-16 PROCEDURE — 99232 SBSQ HOSP IP/OBS MODERATE 35: CPT

## 2022-01-16 RX ORDER — FUROSEMIDE 40 MG
40 TABLET ORAL EVERY 12 HOURS
Refills: 0 | Status: DISCONTINUED | OUTPATIENT
Start: 2022-01-16 | End: 2022-01-01

## 2022-01-16 RX ADMIN — APIXABAN 5 MILLIGRAM(S): 2.5 TABLET, FILM COATED ORAL at 20:06

## 2022-01-16 RX ADMIN — Medication 40 MILLIGRAM(S): at 04:50

## 2022-01-16 RX ADMIN — Medication 3 MILLILITER(S): at 21:04

## 2022-01-16 RX ADMIN — SPIRONOLACTONE 25 MILLIGRAM(S): 25 TABLET, FILM COATED ORAL at 04:50

## 2022-01-16 RX ADMIN — Medication 3 MILLILITER(S): at 08:59

## 2022-01-16 RX ADMIN — GABAPENTIN 100 MILLIGRAM(S): 400 CAPSULE ORAL at 20:05

## 2022-01-16 RX ADMIN — Medication 81 MILLIGRAM(S): at 13:06

## 2022-01-16 RX ADMIN — Medication 40 MILLIGRAM(S): at 20:05

## 2022-01-16 RX ADMIN — APIXABAN 5 MILLIGRAM(S): 2.5 TABLET, FILM COATED ORAL at 04:51

## 2022-01-16 RX ADMIN — Medication 1 TABLET(S): at 13:07

## 2022-01-16 RX ADMIN — Medication 100 MILLIGRAM(S): at 06:20

## 2022-01-16 RX ADMIN — AMIODARONE HYDROCHLORIDE 200 MILLIGRAM(S): 400 TABLET ORAL at 04:50

## 2022-01-16 RX ADMIN — Medication 3 MILLILITER(S): at 03:50

## 2022-01-16 RX ADMIN — Medication 650 MILLIGRAM(S): at 13:01

## 2022-01-16 RX ADMIN — GABAPENTIN 100 MILLIGRAM(S): 400 CAPSULE ORAL at 13:06

## 2022-01-16 RX ADMIN — Medication 3 MILLILITER(S): at 15:23

## 2022-01-16 RX ADMIN — GABAPENTIN 100 MILLIGRAM(S): 400 CAPSULE ORAL at 04:50

## 2022-01-16 RX ADMIN — Medication 650 MILLIGRAM(S): at 11:27

## 2022-01-16 NOTE — PROGRESS NOTE ADULT - ATTENDING COMMENTS
I saw and examined Mr Allen today.   His sat is normal but with slight movement he feels short of breath and can not take full breaths.  Agree with IV diuretics today. pt is s/p cardioversion, on AC as well.  We will follow with you.

## 2022-01-16 NOTE — PROGRESS NOTE ADULT - ASSESSMENT
61 yr old male with CAD, dilated cardiomyopathy, s/p ICD, atrial flutter on Eliquis, substance abuse was admitted initially from 12/24 for decompensated CHF, was on Milrinone and Bumex drips, left AMA on 12/31 and returned to the ED later in the day as he had to take care of personal matter. He reported shortness of breath and right arm and foot pain on presentation. Cardiology was consulted, he was placed on oral Bumex., subsequently transitioned to Bumex gtt. Patient reported he was recently evicted from his apartment and is homeless, social work consult placed. Seen by PT and recommend  CHAPINCITO    1-Decompensated HFrEF s/p ICD  s/p iv bumex infusion   Stopped  Milrinone 1/14  cont Torsemide , metoprolol   monitor lytes and renal function  counseling given re diet and medication compliance   Not candidate for advanced therapies due to smoking/cocaine use history and non-compliance.    2- Atrial  flutter  Continue Amiodarone,  Metoprolol, Eliquis.  controlled     3-DM type 2 , no on insulin at home   BG is normal range   HGA1c 6.2 in November,      4-DERRICK and CRF stage 3b  Monitor renal function on Aldactone and torsemide    5- h/o smoking stopped 1 year ago   COPD suspected   will start neb therapy     6- Moderate protein spencer malnutrition   cont diet and  glucerna    DVT prophylaxis: On Eliquis.  Dispo: [facility] planing to rehab Sunday or Monday. TRACE on board

## 2022-01-16 NOTE — PROGRESS NOTE ADULT - ASSESSMENT
60 y/o with h/o chronic systolic HF ACC/AHA stage C, NICMP LVEF <20% LVIDd 6.96cm, nonobstructive CAD, s/p ICD, HTN, active tobacco use, ?COPD who presented with decompensated HF. He reports being compliant with medications but not so much with low Na diet due to living in a group home. Of note, the patient was recently admitted for acute decompensated HF in the setting of aflutter w/ RVR. He underwent successful VANESSA/DCCV on 11/22/21. This is his 4th hospitalization in the past year. He was started on bumex/milrinone gtt with good response. S.P A drug holiday now starting back on Demadex today but he is dyspneic     DILATED CARDIOMYOPATHY  torsemide 40MG QD  keep I > O  Spirolactone  ACEi/ARB/ARNi once euvolemic. SGLT2i as outpt.  Fluid restriction  Metoprolol 100 qd  Hyponatremia due to fluid overload    ATRIAL FLUTTER  S/P CARDIOVERSION 11/22/21  Eliquis  Amiodarone    HLD    Lipitor    RENAL INSUFF  Stable    ELEVATED LFT  Stable due to hepatic congestion due to chf                CARDIAC MEDS  aMIOdarone    Tablet 200 milliGRAM(s) Oral daily  apixaban 5 milliGRAM(s) Oral every 12 hours  aspirin enteric coated 81 milliGRAM(s) Oral daily  atorvastatin 40 milliGRAM(s) Oral at bedtime  metoprolol succinate  milliGRAM(s) Oral daily  spironolactone 25 milliGRAM(s) Oral daily  torsemide 40 milliGRAM(s) Oral daily 60 y/o with h/o chronic systolic HF ACC/AHA stage C, NICMP LVEF <20% LVIDd 6.96cm, nonobstructive CAD, s/p ICD, HTN, active tobacco use, ?COPD who presented with decompensated HF. He reports being compliant with medications but not so much with low Na diet due to living in a group home. Of note, the patient was recently admitted for acute decompensated HF in the setting of aflutter w/ RVR. He underwent successful VANESSA/DCCV on 11/22/21. This is his 4th hospitalization in the past year. He was started on bumex/milrinone gtt with good response. S.P A drug holiday now starting back on Demadex today but he is dyspneic     DILATED CARDIOMYOPATHY  increased edema will initiated lasix 40 ivp bid  keep I > O  Spirolactone  ACEi/ARB/ARNi once euvolemic. SGLT2i as outpt.  Fluid restriction  Metoprolol 100 qd  Hyponatremia due to fluid overload    ATRIAL FLUTTER  S/P CARDIOVERSION 11/22/21  Eliquis  Amiodarone    HLD    Lipitor    RENAL INSUFF  Stable    ELEVATED LFT  Stable due to hepatic congestion due to chf                CARDIAC MEDS  aMIOdarone    Tablet 200 milliGRAM(s) Oral daily  apixaban 5 milliGRAM(s) Oral every 12 hours  aspirin enteric coated 81 milliGRAM(s) Oral daily  atorvastatin 40 milliGRAM(s) Oral at bedtime  metoprolol succinate  milliGRAM(s) Oral daily  spironolactone 25 milliGRAM(s) Oral daily  torsemide 40 milliGRAM(s) Oral daily

## 2022-01-16 NOTE — PROGRESS NOTE ADULT - SUBJECTIVE AND OBJECTIVE BOX
Pt is seen for  CHF , dyspnea      chart reviewed , he is sitting in the bedside   he is feeling well   off oxygen       Vital Signs Last 24 Hrs  T(C): 36.5 (16 Jan 2022 08:17), Max: 36.8 (15 David 2022 21:30)  T(F): 97.7 (16 Jan 2022 08:17), Max: 98.2 (15 David 2022 21:30)  HR: 67 (16 Jan 2022 09:03) (63 - 82)  BP: 104/68 (16 Jan 2022 08:17) (96/70 - 124/78)  BP(mean): --  RR: 18 (16 Jan 2022 08:17) (18 - 19)  SpO2: 97% (16 Jan 2022 09:03) (96% - 100%)    CONSTITUTIONAL: NAD  ENMT: Moist oral mucosa, no pharyngeal injection or exudates  RESPIRATORY: Normal respiratory effort; lungs are clear to auscultation bilaterally  CARDIOVASCULAR: Regular rate and rhythm, normal S1 and S2  ABDOMEN: Nontender to palpation, normoactive bowel sounds, no rebound/guarding;   MUSCLOSKELETAL: no pretibial edema ,  no clubbing or cyanosis of digits; no joint swelling or tenderness to palpation  PSYCH: A+O to person, place, and time; affect appropriate  NEUROLOGY: CN 2-12 are intact and symmetric; no gross sensory deficits;                              13.9   6.26  )-----------( 346      ( 16 Jan 2022 07:16 )             44.8     01-16    130<L>  |  88<L>  |  80.7<H>  ----------------------------<  109<H>  4.3   |  26.0  |  1.65<H>    Ca    8.9      16 Jan 2022 07:16  Mg     2.6     01-15    TPro  6.8  /  Alb  3.8  /  TBili  0.9  /  DBili  x   /  AST  34  /  ALT  34  /  AlkPhos  323<H>  01-16            CAPILLARY BLOOD GLUCOSE

## 2022-01-16 NOTE — PROGRESS NOTE ADULT - SUBJECTIVE AND OBJECTIVE BOX
[facility]  [address], [place] [address]    REASON FOR VISIT:  Follow up on chf  UPDATE:  patient is much better this am much dyspenic  TELEMETRY:  Sinus  Intake 480  O 1625 cc          01-16    130<L>  |  88<L>  |  80.7<H>  ----------------------------<  109<H>  4.3   |  26.0  |  1.65<H>    Ca    8.9      16 Jan 2022 07:16  Mg     2.6     01-15    TPro  6.8  /  Alb  3.8  /  TBili  0.9  /  DBili  x   /  AST  34  /  ALT  34  /  AlkPhos  323<H>  01-16    LIVER FUNCTIONS - ( 16 Jan 2022 07:16 )  Alb: 3.8 g/dL / Pro: 6.8 g/dL / ALK PHOS: 323 U/L / ALT: 34 U/L / AST: 34 U/L / GGT: x             MEDICATIONS  (STANDING):  albuterol/ipratropium for Nebulization 3 milliLiter(s) Nebulizer every 6 hours  aMIOdarone    Tablet 200 milliGRAM(s) Oral daily  apixaban 5 milliGRAM(s) Oral every 12 hours  aspirin enteric coated 81 milliGRAM(s) Oral daily  atorvastatin 40 milliGRAM(s) Oral at bedtime  gabapentin 100 milliGRAM(s) Oral three times a day  influenza   Vaccine 0.5 milliLiter(s) IntraMuscular once  metoprolol succinate  milliGRAM(s) Oral daily  multivitamin 1 Tablet(s) Oral daily  spironolactone 25 milliGRAM(s) Oral daily  torsemide 40 milliGRAM(s) Oral daily    ROS:    No fever chills  Cardiac  No cp sob or palp  Resp  no cough no mucus production  Gi  no abd pain no melana  Ext No calf tenderness, no edema    Vital Signs Last 24 Hrs  T(C): 36.8 (16 Jan 2022 04:57), Max: 36.8 (15 David 2022 21:30)  T(F): 98.2 (16 Jan 2022 04:57), Max: 98.2 (15 David 2022 21:30)  HR: 82 (16 Jan 2022 06:15) (63 - 82)  BP: 124/78 (16 Jan 2022 06:15) (96/70 - 124/78)  BP(mean): --  RR: 18 (16 Jan 2022 04:57) (18 - 19)  SpO2: 100% (16 Jan 2022 04:57) (94% - 100%)  T(C): 36.8 (01-16-22 @ 04:57), Max: 36.8 (01-15-22 @ 21:30)  HR: 82 (01-16-22 @ 06:15) (63 - 82)  BP: 124/78 (01-16-22 @ 06:15) (96/70 - 124/78)  RR: 18 (01-16-22 @ 04:57) (18 - 19)  SpO2: 100% (01-16-22 @ 04:57) (94% - 100%)    HEENT Head atraumatic eomi, oral mucosa moist  CV S1&S2      No r/g/ m   RESP    GI  Soft active bowel sounds non tender  EXT  No clubbing/Cyanosis /Edema  NEURO  Alert oriented  No gross motor or sensory deficits       [facility]  [address], [place] [address]    REASON FOR VISIT:  Follow up on chf  UPDATE:  patient is much better this am, dyspnea improved with iv lasix  Diuresed to start torsemide today  + edema today  No further wheezing  TELEMETRY:  Sinus  Intake 480  O 1625 cc      01-16    130<L>  |  88<L>  |  80.7<H>  ----------------------------<  109<H>  4.3   |  26.0  |  1.65<H>    Ca    8.9      16 Jan 2022 07:16  Mg     2.6     01-15    TPro  6.8  /  Alb  3.8  /  TBili  0.9  /  DBili  x   /  AST  34  /  ALT  34  /  AlkPhos  323<H>  01-16    LIVER FUNCTIONS - ( 16 Jan 2022 07:16 )  Alb: 3.8 g/dL / Pro: 6.8 g/dL / ALK PHOS: 323 U/L / ALT: 34 U/L / AST: 34 U/L / GGT: x             MEDICATIONS  (STANDING):  albuterol/ipratropium for Nebulization 3 milliLiter(s) Nebulizer every 6 hours  aMIOdarone    Tablet 200 milliGRAM(s) Oral daily  apixaban 5 milliGRAM(s) Oral every 12 hours  aspirin enteric coated 81 milliGRAM(s) Oral daily  atorvastatin 40 milliGRAM(s) Oral at bedtime  gabapentin 100 milliGRAM(s) Oral three times a day  influenza   Vaccine 0.5 milliLiter(s) IntraMuscular once  metoprolol succinate  milliGRAM(s) Oral daily  multivitamin 1 Tablet(s) Oral daily  spironolactone 25 milliGRAM(s) Oral daily  torsemide 40 milliGRAM(s) Oral daily    ROS:  No fever chills  Cardiac  No cp + sob with exertion or palp  Resp  no cough no mucus production  Gi  no abd pain no melana  Ext No calf tenderness, no edema    Vital Signs Last 24 Hrs  T(C): 36.8 (16 Jan 2022 04:57), Max: 36.8 (15 David 2022 21:30)  T(F): 98.2 (16 Jan 2022 04:57), Max: 98.2 (15 David 2022 21:30)  HR: 82 (16 Jan 2022 06:15) (63 - 82)  BP: 124/78 (16 Jan 2022 06:15) (96/70 - 124/78)  BP(mean): --  RR: 18 (16 Jan 2022 04:57) (18 - 19)  SpO2: 100% (16 Jan 2022 04:57) (94% - 100%)  T(C): 36.8 (01-16-22 @ 04:57), Max: 36.8 (01-15-22 @ 21:30)  HR: 82 (01-16-22 @ 06:15) (63 - 82)  BP: 124/78 (01-16-22 @ 06:15) (96/70 - 124/78)  RR: 18 (01-16-22 @ 04:57) (18 - 19)  SpO2: 100% (01-16-22 @ 04:57) (94% - 100%)    HEENT Head atraumatic eomi, oral mucosa moist  CV S1&S2      No r/g/ m   RESP  CLEAR NO RALES  GI  Soft active bowel sounds non tender  EXT  No clubbing/Cyanosis /Edema 1+ EDEMA TODAY  NEURO  Alert oriented  No gross motor or sensory deficits    < from: TTE Echo Complete w/o Contrast w/ Doppler (11.20.21 @ 08:33) >  Summary:   1. Left ventricular ejection fraction, by visual estimation, is <20%.   2. Technically good study.   3. Severely enlarged left atrium.   4. Severely enlarged right atrium.   5. Severely enlarged right ventricle.   6. Severely reduced RV systolic function.   7. Moderate to severe mitral valve regurgitation.   8. Thickening of the anterior mitral valve leaflet.   9. Moderate tricuspid regurgitation.  10. Estimated pulmonary artery systolic pressure is 37.8 mmHg assuming a right atrial pressure of 8 mmHg, which is consistent with borderline pulmonary hypertension.    < end of copied text >  < from: Cardiac Cath Lab - Adult (06.22.16 @ 12:51) >  CORONARY VESSELS: The coronary circulation is right dominant.  LM:   --  LM: Angiography showed minor luminal irregularities with no flow  limiting lesions.  LAD:   --  Mid LAD: There was a 50 % stenosis.  CX:   --  Circumflex: Angiography showed minor luminal irregularities with  no flow limiting lesions.  RCA:   --  Proximal RCA: There was a 60 % stenosis.  --  Mid RCA: There was a 85 % stenosis.  COMPLICATIONS: There were no complications.  DIAGNOSTIC RECOMMENDATIONS: The patient should continue with the present  medications.  Prepared and signed by  Tahir Wilks M.D.  Signed 06/22/2016 15:44:49  HEMODYNAMIC TABLES  Pressures:  Baseline/ Room Air  Pressures:  - HR: 94  Pressures:  - Rhythm:  Pressures:  -- Left Ventricle (s/edp): 107/39/--  Pressures:  -- Pulmonary Artery (S/D/M): 58/29/40  Pressures:  -- Right Atrium (a/v/M): 29/28/26  Pressures:  -- Right Ventricle (s/edp): 56/25/--  O2 Sats:  Baseline/ Room Air  O2 Sats:  - HR: 94  O2 Sats:  - Rhythm:  O2 Sats:  -- AO: 13.1/95/16.93  O2 Sats:  -- PA: 13.1/35.5/6.32       [facility]  [address], [place] [address]    REASON FOR VISIT:  Follow up on chf  UPDATE:  patient is much better this am, dyspnea improved with iv lasix  Diuresed to start torsemide today  + edema today  No further wheezing  TELEMETRY:  Sinus  Intake 480  O 1625 cc      01-16    130<L>  |  88<L>  |  80.7<H>  ----------------------------<  109<H>  4.3   |  26.0  |  1.65<H>    Ca    8.9      16 Jan 2022 07:16  Mg     2.6     01-15    TPro  6.8  /  Alb  3.8  /  TBili  0.9  /  DBili  x   /  AST  34  /  ALT  34  /  AlkPhos  323<H>  01-16    LIVER FUNCTIONS - ( 16 Jan 2022 07:16 )  Alb: 3.8 g/dL / Pro: 6.8 g/dL / ALK PHOS: 323 U/L / ALT: 34 U/L / AST: 34 U/L / GGT: x             MEDICATIONS  (STANDING):  albuterol/ipratropium for Nebulization 3 milliLiter(s) Nebulizer every 6 hours  aMIOdarone    Tablet 200 milliGRAM(s) Oral daily  apixaban 5 milliGRAM(s) Oral every 12 hours  aspirin enteric coated 81 milliGRAM(s) Oral daily  atorvastatin 40 milliGRAM(s) Oral at bedtime  gabapentin 100 milliGRAM(s) Oral three times a day  influenza   Vaccine 0.5 milliLiter(s) IntraMuscular once  metoprolol succinate  milliGRAM(s) Oral daily  multivitamin 1 Tablet(s) Oral daily  spironolactone 25 milliGRAM(s) Oral daily  torsemide 40 milliGRAM(s) Oral daily    ROS:  No fever chills  Cardiac  No cp + sob with exertion or palp  Resp  no cough no mucus production  Gi  no abd pain no melana  Ext No calf tenderness, no edema    Vital Signs Last 24 Hrs  T(C): 36.8 (16 Jan 2022 04:57), Max: 36.8 (15 David 2022 21:30)  T(F): 98.2 (16 Jan 2022 04:57), Max: 98.2 (15 David 2022 21:30)  HR: 82 (16 Jan 2022 06:15) (63 - 82)  BP: 124/78 (16 Jan 2022 06:15) (96/70 - 124/78)  BP(mean): --  RR: 18 (16 Jan 2022 04:57) (18 - 19)  SpO2: 100% (16 Jan 2022 04:57) (94% - 100%)  T(C): 36.8 (01-16-22 @ 04:57), Max: 36.8 (01-15-22 @ 21:30)  HR: 82 (01-16-22 @ 06:15) (63 - 82)  BP: 124/78 (01-16-22 @ 06:15) (96/70 - 124/78)  RR: 18 (01-16-22 @ 04:57) (18 - 19)  SpO2: 100% (01-16-22 @ 04:57) (94% - 100%)    HEENT Head atraumatic eomi, oral mucosa moist  CV S1&S2      No r/g/ m   RESP  CLEAR NO RALES  GI  Soft active bowel sounds non tender  EXT  No clubbing/Cyanosis /Edema 1+ EDEMA TODAY  NEURO  Alert oriented  No gross motor or sensory deficits  Skin warm well perfused    < from: TTE Echo Complete w/o Contrast w/ Doppler (11.20.21 @ 08:33) >  Summary:   1. Left ventricular ejection fraction, by visual estimation, is <20%.   2. Technically good study.   3. Severely enlarged left atrium.   4. Severely enlarged right atrium.   5. Severely enlarged right ventricle.   6. Severely reduced RV systolic function.   7. Moderate to severe mitral valve regurgitation.   8. Thickening of the anterior mitral valve leaflet.   9. Moderate tricuspid regurgitation.  10. Estimated pulmonary artery systolic pressure is 37.8 mmHg assuming a right atrial pressure of 8 mmHg, which is consistent with borderline pulmonary hypertension.    < end of copied text >  < from: Cardiac Cath Lab - Adult (06.22.16 @ 12:51) >  CORONARY VESSELS: The coronary circulation is right dominant.  LM:   --  LM: Angiography showed minor luminal irregularities with no flow  limiting lesions.  LAD:   --  Mid LAD: There was a 50 % stenosis.  CX:   --  Circumflex: Angiography showed minor luminal irregularities with  no flow limiting lesions.  RCA:   --  Proximal RCA: There was a 60 % stenosis.  --  Mid RCA: There was a 85 % stenosis.  COMPLICATIONS: There were no complications.  DIAGNOSTIC RECOMMENDATIONS: The patient should continue with the present  medications.  Prepared and signed by  Tahir Wilks M.D.  Signed 06/22/2016 15:44:49  HEMODYNAMIC TABLES  Pressures:  Baseline/ Room Air  Pressures:  - HR: 94  Pressures:  - Rhythm:  Pressures:  -- Left Ventricle (s/edp): 107/39/--  Pressures:  -- Pulmonary Artery (S/D/M): 58/29/40  Pressures:  -- Right Atrium (a/v/M): 29/28/26  Pressures:  -- Right Ventricle (s/edp): 56/25/--  O2 Sats:  Baseline/ Room Air  O2 Sats:  - HR: 94  O2 Sats:  - Rhythm:  O2 Sats:  -- AO: 13.1/95/16.93  O2 Sats:  -- PA: 13.1/35.5/6.32

## 2022-01-17 NOTE — PROGRESS NOTE ADULT - REASON FOR ADMISSION
CHF

## 2022-01-17 NOTE — PROGRESS NOTE ADULT - PROVIDER SPECIALTY LIST ADULT
Hospitalist
Hospitalist
Cardiology
Heart Failure
Hospitalist
Cardiology
Cardiology
Heart Failure
Heart Failure
Hospitalist
Cardiology
Heart Failure
Hospitalist
Heart Failure
Hospitalist
Heart Failure
Cardiology
Heart Failure
Cardiology
Heart Failure

## 2022-01-17 NOTE — PROGRESS NOTE ADULT - SUBJECTIVE AND OBJECTIVE BOX
[id number]            [facility]                          [address] [address]                       Phone: [phone]. Fax:[phone]                      ________________________________________________    HPI:  61 year old male with history of CAD, Severely Dilated CM/HFrEF s/p ICD, Substance abuse. Atrial Flutter on Eliquis, ETOH use who presented to the ED with complaints of right arm and foot pain with increased shortness of breath due to medication non-compliance and admitted with decompensated heart failure on  then pt left against medical advice on  for personal matter. Pt returned this evening to be readmitted. Pt states he was getting evicted and needs to get all his belongings including a photo frame of him and his mother that means a lot to him. He states that he dropped his stuff off at his friend house then took taxi back to the hospital. currently stable without cp, sob, denies substance abuse or drinking alcoholic beverages.    (2022 03:37)    HOME MEDICATIONS:      ROS: All review of systems negative unless indicated otherwise below.                         PHYSICAL EXAM:    GENERAL: Volume overloaded, NAD   NECK: Supple, No JVD  NERVOUS SYSTEM:  Alert & Oriented X3, non focal neuro exam.   CHEST/LUNG: clear lungs, No rales, rhonchi, wheezing, or rubs  HEART: Regular rate and rhythm; s1 and s2 auscultated, No murmurs, rubs, or gallops  ABDOMEN: Soft, Nontender, Nondistended; Bowel sounds present and normoactive.   EXTREMITIES:  2+ Peripheral Pulses, No clubbing, cyanosis, LE edema        Vital Signs Last 24 Hrs  T(C): 36.6 (2022 10:00), Max: 36.6 (2022 10:00)  T(F): 97.8 (2022 10:00), Max: 97.8 (2022 10:00)  HR: 76 (2022 10:00) (68 - 76)  BP: 117/71 (2022 10:00) (90/57 - 117/71)  BP(mean): --  RR: 18 (2022 10:00) (18 - 18)  SpO2: 95% (2022 10:00) (93% - 96%)                                                   DAILY WEIGHTS - 48 HOUR TREND     Daily Weight in k.2 (2022 05:48), Weight in k.9 (2022 05:57)                             INTAKE AND OUTPUT - 48 HOUR TREND     01-15-22 @ 07:01  -  22 @ 07:00  --------------------------------------------------------  IN:  Total IN: 0 mL    OUT:    Voided (mL): 1625 mL  Total OUT: 1625 mL    Total NET: -1625 mL      22 @ 07:01  -  22 @ 07:00  --------------------------------------------------------  IN:  Total IN: 0 mL    OUT:    Voided (mL): 600 mL  Total OUT: 600 mL    Total NET: -600 mL                                                           LAB RESULTS                 COMPLETE BLOOD COUNT( 2022 07:16 )                            13.9 g/dL  6.26 K/uL )---------------( 346 K/uL                        44.8 %      Automated Differential     Auto Basophil # - X      Auto Basophil % - X      Auto Eosinophil # - X      Auto Eosinophil % - X      Auto Immature Granulocyte # - X      Auto Immature Granulocyte % - X      Auto Lymphocyte # - X      Auto Lymphocyte % - X      Auto Monocyte # - X      Auto Monocyte % - X      Auto Neutrophil # - X      Auto Neutrophil % - X                                      CHEMISTRY                 Basic Metabolic Panel (22 @ 07:16)    130<L>  |  88<L>  |  80.7<H>  ----------------------------<  109<H>  4.3   |  26.0  |  1.65<H>    Ca    8.9      2022 07:16  Mg     2.6     01-15                    Liver Functions (22 @ 07:16))  TPro  6.8  /  Alb  3.8  /  TBili  0.9  /  DBili  x   /  AST  34  /  ALT  34  /  AlkPhos  323     PT/INR/PTT ( 28 Dec 2021 05:48 )                        :                       :      16.7         :       X                     .        .                   .              .           .       1.47        .                                                                 Cardiac Enzymes   ( 2022 07:16 )  Troponin T  X    ,  CPK  X    , CKMB  X    , BNP 74751<H>                             Current Admission Active Medications    acetaminophen     Tablet .. 650 milliGRAM(s) Oral every 6 hours PRN Temp greater or equal to 38C (100.4F), Mild Pain (1 - 3)  albuterol/ipratropium for Nebulization 3 milliLiter(s) Nebulizer every 6 hours  aluminum hydroxide/magnesium hydroxide/simethicone Suspension 30 milliLiter(s) Oral every 4 hours PRN Dyspepsia  aMIOdarone    Tablet 200 milliGRAM(s) Oral daily  apixaban 5 milliGRAM(s) Oral every 12 hours  aspirin enteric coated 81 milliGRAM(s) Oral daily  atorvastatin 40 milliGRAM(s) Oral at bedtime  furosemide   Injectable 40 milliGRAM(s) IV Push every 12 hours  gabapentin 100 milliGRAM(s) Oral three times a day  influenza   Vaccine 0.5 milliLiter(s) IntraMuscular once  melatonin 3 milliGRAM(s) Oral at bedtime PRN Insomnia  methylPREDNISolone sodium succinate Injectable 40 milliGRAM(s) IV Push every 8 hours  metoprolol succinate  milliGRAM(s) Oral daily  multivitamin 1 Tablet(s) Oral daily  ondansetron Injectable 4 milliGRAM(s) IV Push every 8 hours PRN Nausea and/or Vomiting  spironolactone 25 milliGRAM(s) Oral daily

## 2022-01-17 NOTE — PROGRESS NOTE ADULT - ASSESSMENT
61 yr old male with CAD, dilated cardiomyopathy, s/p ICD, atrial flutter on Eliquis, substance abuse was admitted initially from 12/24 for decompensated CHF, was on Milrinone and Bumex drips, left AMA on 12/31 and returned to the ED later in the day as he had to take care of personal matter. He reported shortness of breath and right arm and foot pain on presentation. Cardiology was consulted, he was placed on oral Bumex., subsequently transitioned to Bumex gtt. Patient reported he was recently evicted from his apartment and is homeless, social work consult placed. Seen by PT and recommend  [facility]IN[facility]O    1-Decompensated HFrEF s/p ICD  s/p iv bumex infusion   Stopped  Milrinone 1/14  cont Torsemide , metoprolol   monitor lytes and renal function  counseling given re diet and medication compliance   Not candidate for advanced therapies due to smoking/cocaine use history and non-compliance.    2- Atrial  flutter  Continue Amiodarone,  Metoprolol, Eliquis.  controlled     3- Dyspnea 'likely multifactorial   CHF and COPD   add short course of steroind   on neb   'will add inhaler     4-DM type 2 , no on insulin at home   BG is normal range   HGA1c 6.2 in November,      5--DERRICK and CRF stage 3b  Monitor renal function on Aldactone and torsemide  stable     6- h/o smoking stopped 1 year ago   emphyseam . COPD   refer to pulmonologist on discharge for PFTS   add symbicort inahler on discharge     7- Moderate protein spencer malnutrition   cont diet and  glucerna    DVT prophylaxis: On Eliquis.  Dispo: [facility] planing to rehab    SW on board

## 2022-01-17 NOTE — PROGRESS NOTE ADULT - SUBJECTIVE AND OBJECTIVE BOX
Pt is seen for  CHF , dyspnea  , COPD     Pt is seen examined   c/o SOB on off at rest and miniaml exertion   worse today per pt   lying in  the bed looks comfortable , no SOB noted   off oxygen        MEDICATIONS  (STANDING):  albuterol/ipratropium for Nebulization 3 milliLiter(s) Nebulizer every 6 hours    apixaban 5 milliGRAM(s) Oral every 12 hours  aspirin enteric coated 81 milliGRAM(s) Oral daily  atorvastatin 40 milliGRAM(s) Oral at bedtime  furosemide   Injectable 40 milliGRAM(s) IV Push every 12 hours  gabapentin 100 milliGRAM(s) Oral three times a day  influenza   Vaccine 0.5 milliLiter(s) IntraMuscular once  methylPREDNISolone sodium succinate Injectable 40 milliGRAM(s) IV Push every 8 hours  metoprolol succinate  milliGRAM(s) Oral daily  multivitamin 1 Tablet(s) Oral daily  spironolactone 25 milliGRAM(s) Oral daily  MIOdarone    Tablet 200 milliGRAM(s) Oral daily      Vital Signs Last 24 Hrs  T(C): 36.6 (17 Jan 2022 10:00), Max: 36.8 (16 Jan 2022 15:40)  T(F): 97.8 (17 Jan 2022 10:00), Max: 98.3 (16 Jan 2022 15:40)  HR: 76 (17 Jan 2022 10:00) (61 - 76)  BP: 117/71 (17 Jan 2022 10:00) (90/57 - 117/71)  BP(mean): --  RR: 18 (17 Jan 2022 10:00) (18 - 18)  SpO2: 95% (17 Jan 2022 10:00) (93% - 99%)    CONSTITUTIONAL: NAD  ENMT: Moist oral mucosa, no pharyngeal injection or exudates  RESPIRATORY: Normal respiratory effort; lungs are clear to auscultation bilaterally  CARDIOVASCULAR: Regular rate and rhythm, normal S1 and S2  ABDOMEN: Nontender to palpation, normoactive bowel sounds, no rebound/guarding;   MUSCLOSKELETAL: no pretibial edema ,  no clubbing or cyanosis of digits; no joint swelling or tenderness to palpation  PSYCH: A+O to person, place, and time; affect appropriate  NEUROLOGY: CN 2-12 are intact and symmetric; no gross sensory deficits;                                    13.9   6.26  )-----------( 346      ( 16 Jan 2022 07:16 )             44.8   01-16    130<L>  |  88<L>  |  80.7<H>  ----------------------------<  109<H>  4.3   |  26.0  |  1.65<H>    Ca    8.9      16 Jan 2022 07:16    TPro  6.8  /  Alb  3.8  /  TBili  0.9  /  DBili  x   /  AST  34  /  ALT  34  /  AlkPhos  323<H>  01-16

## 2022-01-17 NOTE — PROGRESS NOTE ADULT - ASSESSMENT
62 y/o with h/o chronic systolic HF ACC/AHA stage C, NICMP LVEF <20% LVIDd 6.96cm, nonobstructive CAD, s/p ICD, HTN, active tobacco use, ?COPD who presented with decompensated HF. He reports being compliant with medications but not so much with low Na diet due to living in a group home. Of note, the patient was recently admitted for acute decompensated HF in the setting of aflutter w/ RVR. He underwent successful VANESSA/DCCV on 11/22/21. This is his 4th hospitalization in the past year. He was started on bumex/milrinone gtt with good response. S.P A drug holiday now starting back on Demadex today but he is dyspneic     DILATED CARDIOMYOPATHY  increased edema will continue lasix 40 ivp bid  loculated effusion on CXR, consider CTS eval   goal net negative urine output 1-2L in 24h   GDMT: continue metoprolol, Spirolactone  ACEi/ARB/ARNi once euvolemic. SGLT2i as outpt.  Fluid restriction  Hyponatremia due to fluid overload    ATRIAL FLUTTER  S/P CARDIOVERSION 11/22/21  Eliquis  Amiodarone    HLD    Lipitor    RENAL INSUFF  Stable    ELEVATED LFT  Stable due to hepatic congestion due to chf

## 2022-01-17 NOTE — PROGRESS NOTE ADULT - ATTENDING COMMENTS
pt see and examined  plan of care gemma np  pt remains dyspneic despite having good o2 sat.   on ac with afib s/p cardioversion. Cont with iv lasix.  If needed add metolazone tomorrow  monitor pt's i/os. gemma nurse.

## 2022-01-17 NOTE — PROGRESS NOTE ADULT - NUTRITIONAL ASSESSMENT
This patient has been assessed with a concern for Malnutrition and has been determined to have a diagnosis/diagnoses of Moderate protein-calorie malnutrition.    This patient is being managed with:   Diet DASH/TLC-  Sodium & Cholesterol Restricted  1500mL Fluid Restriction ([id number])  Supplement Feeding Modality:  Oral  Ensure Enlive Cans or Servings Per Day:  1       Frequency:  Two Times a day  Entered: David 10 2022  7:48AM    
This patient has been assessed with a concern for Malnutrition and has been determined to have a diagnosis/diagnoses of Moderate protein-calorie malnutrition.    This patient is being managed with:   Diet DASH/TLC-  Sodium & Cholesterol Restricted  1500mL Fluid Restriction ([id number])  Supplement Feeding Modality:  Oral  Ensure Enlive Cans or Servings Per Day:  1       Frequency:  Two Times a day  Entered: David 10 2022  7:48AM    
This patient has been assessed with a concern for Malnutrition and has been determined to have a diagnosis/diagnoses of Moderate protein-calorie malnutrition.    This patient is being managed with:   Diet DASH/TLC-  Sodium & Cholesterol Restricted  1500mL Fluid Restriction ([id number])  Entered: David  3 2022  2:13PM    
This patient has been assessed with a concern for Malnutrition and has been determined to have a diagnosis/diagnoses of Moderate protein-calorie malnutrition.    This patient is being managed with:   Diet DASH/TLC-  Sodium & Cholesterol Restricted  1500mL Fluid Restriction ([id number])  Supplement Feeding Modality:  Oral  Ensure Enlive Cans or Servings Per Day:  1       Frequency:  Two Times a day  Entered: David 10 2022  7:48AM    
This patient has been assessed with a concern for Malnutrition and has been determined to have a diagnosis/diagnoses of Moderate protein-calorie malnutrition.    This patient is being managed with:   Diet DASH/TLC-  Sodium & Cholesterol Restricted  1500mL Fluid Restriction ([id number])  Supplement Feeding Modality:  Oral  Ensure Enlive Cans or Servings Per Day:  1       Frequency:  Two Times a day  Entered: David 10 2022  7:48AM    
This patient has been assessed with a concern for Malnutrition and has been determined to have a diagnosis/diagnoses of Moderate protein-calorie malnutrition.    This patient is being managed with:   Diet DASH/TLC-  Sodium & Cholesterol Restricted  1500mL Fluid Restriction ([id number])  Entered: David  3 2022  2:13PM    
This patient has been assessed with a concern for Malnutrition and has been determined to have a diagnosis/diagnoses of Moderate protein-calorie malnutrition.    This patient is being managed with:   Diet DASH/TLC-  Sodium & Cholesterol Restricted  1500mL Fluid Restriction ([id number])  Supplement Feeding Modality:  Oral  Ensure Enlive Cans or Servings Per Day:  1       Frequency:  Two Times a day  Entered: David 10 2022  7:48AM    
This patient has been assessed with a concern for Malnutrition and has been determined to have a diagnosis/diagnoses of Moderate protein-calorie malnutrition.    This patient is being managed with:   Diet DASH/TLC-  Sodium & Cholesterol Restricted  1500mL Fluid Restriction ([id number])  Supplement Feeding Modality:  Oral  Ensure Enlive Cans or Servings Per Day:  1       Frequency:  Two Times a day  Entered: David 10 2022  7:48AM    
This patient has been assessed with a concern for Malnutrition and has been determined to have a diagnosis/diagnoses of Moderate protein-calorie malnutrition.    This patient is being managed with:   Diet DASH/TLC-  Sodium & Cholesterol Restricted  1500mL Fluid Restriction ([id number])  Supplement Feeding Modality:  Oral  Ensure Enlive Cans or Servings Per Day:  1       Frequency:  Two Times a day  Entered: David 10 2022  7:48AM    
This patient has been assessed with a concern for Malnutrition and has been determined to have a diagnosis/diagnoses of Moderate protein-calorie malnutrition.    This patient is being managed with:   Diet DASH/TLC-  Sodium & Cholesterol Restricted  1500mL Fluid Restriction ([id number])  Supplement Feeding Modality:  Oral  Ensure Enlive Cans or Servings Per Day:  1       Frequency:  Two Times a day  Entered: David 10 2022  7:48AM    
This patient has been assessed with a concern for Malnutrition and has been determined to have a diagnosis/diagnoses of Moderate protein-calorie malnutrition.    This patient is being managed with:   Diet DASH/TLC-  Sodium & Cholesterol Restricted  1500mL Fluid Restriction ([id number])  Supplement Feeding Modality:  Oral  Ensure Enlive Cans or Servings Per Day:  1       Frequency:  Two Times a day  Entered: David 10 2022  7:48AM    
This patient has been assessed with a concern for Malnutrition and has been determined to have a diagnosis/diagnoses of Moderate protein-calorie malnutrition.    This patient is being managed with:   Diet DASH/TLC-  Sodium & Cholesterol Restricted  1500mL Fluid Restriction ([id number])  Supplement Feeding Modality:  Oral  Ensure Enlive Cans or Servings Per Day:  1       Frequency:  Two Times a day  Entered: David 10 2022  7:48AM    
This patient has been assessed with a concern for Malnutrition and has been determined to have a diagnosis/diagnoses of Moderate protein-calorie malnutrition.    This patient is being managed with:   Diet DASH/TLC-  Sodium & Cholesterol Restricted  1500mL Fluid Restriction ([id number])  Entered: David  3 2022  2:13PM

## 2022-01-18 NOTE — DISCHARGE NOTE PROVIDER - CARE PROVIDER_API CALL
Hugo Shabazz)  Cardiovascular Disease  [address], [address]  [place] [address]  Phone: [phone]  Fax: [phone]  Follow Up Time:     Genesis Hewitt)  Adv Heart Fail Trnsplnt Cardio; Cardiovascular Disease; Internal Medicine  [address] [place] [address]  Phone: [phone]  Fax: [phone]  Follow Up Time:    Hugo Shabazz)  Cardiovascular Disease  [address], [address]  [place] [address]  Phone: [phone]  Fax: [phone]  Follow Up Time:     Genesis Hewitt)  Adv Heart Fail Trnsplnt Cardio; Cardiovascular Disease; Internal Medicine  [address] [place] [address]  Phone: [phone]  Fax: [phone]  Follow Up Time:     Mynor Calzada)  Internal Medicine; Pulmonary Disease  [address], [address]  [place], [place] [address]  Phone: [phone]  Fax: [phone]  Follow Up Time: 2 weeks

## 2022-01-18 NOTE — DISCHARGE NOTE NURSING/CASE MANAGEMENT/SOCIAL WORK - PATIENT PORTAL LINK FT
You can access the FollowMyHealth Patient Portal offered by [facility] by registering at the following website: [email or web address]. By joining [employer]’s FollowMyHealth portal, you will also be able to view your health information using other applications (apps) compatible with our system.

## 2022-01-18 NOTE — DISCHARGE NOTE PROVIDER - DETAILS OF MALNUTRITION DIAGNOSIS/DIAGNOSES
This patient has been assessed with a concern for Malnutrition and was treated during this hospitalization for the following Nutrition diagnosis/diagnoses:     -  01/07/2022: Moderate protein-calorie malnutrition

## 2022-01-18 NOTE — DISCHARGE NOTE PROVIDER - NSDCCPCAREPLAN_GEN_ALL_CORE_FT
PRINCIPAL DISCHARGE DIAGNOSIS  Diagnosis: Dilated cardiomyopathy  Assessment and Plan of Treatment: Continue your current medications as prescribed  Limit fluid intake to 1500 cc's daily  Weigh yourself daily and call your Heart specialist if you gain more than 2 lbs in one day or 5 lbs in one week.      SECONDARY DISCHARGE DIAGNOSES  Diagnosis: Atrial flutter  Assessment and Plan of Treatment: Continue amiodarone/BB for rate control  Continue Eliquis for AC    Diagnosis: Acute on chronic systolic congestive heart failure  Assessment and Plan of Treatment: Plan as above    Diagnosis: DERRICK (acute kidney injury)  Assessment and Plan of Treatment: Your renal function is at baseline  Monitor BMP on diuretics/aldactone     PRINCIPAL DISCHARGE DIAGNOSIS  Diagnosis: Dilated cardiomyopathy  Assessment and Plan of Treatment: Continue your current medications as prescribed  Limit fluid intake to 1500 cc's daily  Weigh yourself daily and call your Heart specialist if you gain more than 2 lbs in one day or 5 lbs in one week.      SECONDARY DISCHARGE DIAGNOSES  Diagnosis: Atrial flutter  Assessment and Plan of Treatment: Continue amiodarone/BB for rate control  Continue Eliquis for AC    Diagnosis: DERRICK (acute kidney injury)  Assessment and Plan of Treatment: Your renal function is at baseline  Monitor BMP on diuretics/aldactone    Diagnosis: Acute on chronic systolic congestive heart failure  Assessment and Plan of Treatment: Plan as above    Diagnosis: COPD without exacerbation  Assessment and Plan of Treatment: do not smoke   take your inahler   follow up with pulmonologist after discharge from rehab

## 2022-01-18 NOTE — DISCHARGE NOTE PROVIDER - PROVIDER TOKENS
PROVIDER:[TOKEN:[[id number]]],PROVIDER:[TOKEN:[[id number]]] PROVIDER:[TOKEN:[[id number]]],PROVIDER:[TOKEN:[[id number]]],PROVIDER:[TOKEN:[[id number]],FOLLOWUP:[2 weeks]]

## 2022-01-18 NOTE — DISCHARGE NOTE PROVIDER - CARE PROVIDERS DIRECT ADDRESSES
,[email or web address],DirectAddress_Unknown ,[email or web address],DirectAddress_Unknown,DirectAddress_Unknown

## 2022-01-18 NOTE — DISCHARGE NOTE NURSING/CASE MANAGEMENT/SOCIAL WORK - NSDCPEFALRISK_GEN_ALL_CORE
For information on Fall & Injury Prevention, visit: [email or web address] OR  [email or web address] OR  https://www.cdc.gov/steadi/patient.html

## 2022-01-18 NOTE — DISCHARGE NOTE PROVIDER - NSDCPNSUBOBJ_GEN_ALL_CORE
pt is seen in am , off oxygen   doing well resting in the bedside   no overnight events     MEDICATIONS  (STANDING):  albuterol/ipratropium for Nebulization 3 milliLiter(s) Nebulizer every 6 hours  aMIOdarone    Tablet 200 milliGRAM(s) Oral daily  apixaban 5 milliGRAM(s) Oral every 12 hours  aspirin enteric coated 81 milliGRAM(s) Oral daily  atorvastatin 40 milliGRAM(s) Oral at bedtime  budesonide 160 MICROgram(s)/formoterol 4.5 MICROgram(s) Inhaler 2 Puff(s) Inhalation two times a day  gabapentin 100 milliGRAM(s) Oral three times a day  influenza   Vaccine 0.5 milliLiter(s) IntraMuscular once  metoprolol succinate  milliGRAM(s) Oral daily  multivitamin 1 Tablet(s) Oral daily  spironolactone 25 milliGRAM(s) Oral daily  torsemide 40 milliGRAM(s) Oral daily          Vital Signs Last 24 Hrs  T(C): 36.4 (18 Jan 2022 10:06), Max: 36.6 (17 Jan 2022 20:00)  T(F): 97.6 (18 Jan 2022 10:06), Max: 97.8 (17 Jan 2022 20:00)  HR: 70 (18 Jan 2022 10:06) (65 - 75)  BP: 115/82 (18 Jan 2022 10:06) (100/67 - 115/82)  BP(mean): --  RR: 16 (18 Jan 2022 10:06) (16 - 18)  SpO2: 95% (18 Jan 2022 05:00) (93% - 95%)        18 Jan 2022 08:    127    |  85     |  99.1   ----------------------------<  192    5.1     |  25.0   |  1.72     Ca    9.0        18 Jan 2022 08:18  Phos  5.1       18 Jan 2022 08:18  Mg     2.7       18 Jan 2022 08:18        CAPILLARY BLOOD GLUCOSE      CONSTITUTIONAL: NAD  ENMT: Moist oral mucosa, no pharyngeal injection or exudates  RESPIRATORY: Normal respiratory effort; lungs are clear to auscultation bilaterally  CARDIOVASCULAR: Regular rate and rhythm, no  Abdo soft no tenderness   EXt : no pretibial edema

## 2022-01-18 NOTE — DISCHARGE NOTE PROVIDER - NSDCMRMEDTOKEN_GEN_ALL_CORE_FT
acetaminophen 325 mg oral tablet: 2 tab(s) orally every 6 hours, As needed, Temp greater or equal to 38C (100.4F), Mild Pain (1 - 3)  aluminum hydroxide-magnesium hydroxide 200 mg-200 mg/5 mL oral suspension: 30 milliliter(s) orally every 4 hours, As needed, Dyspepsia  amiodarone 200 mg oral tablet: 1 tab(s) orally once a day  apixaban 5 mg oral tablet: 1 tab(s) orally every 12 hours  aspirin 81 mg oral delayed release tablet: 1 tab(s) orally once a day  atorvastatin 40 mg oral tablet: 1 tab(s) orally once a day (at bedtime)  budesonide-formoterol 160 mcg-4.5 mcg/inh inhalation aerosol: 2 puff(s) inhaled 2 times a day  gabapentin 100 mg oral capsule: 1 cap(s) orally 3 times a day  melatonin 3 mg oral tablet: 1 tab(s) orally once a day (at bedtime), As needed, Insomnia  metoprolol succinate 100 mg oral tablet, extended release: 1 tab(s) orally once a day  Multiple Vitamins oral tablet: 1 tab(s) orally once a day  spironolactone 25 mg oral tablet: 1 tab(s) orally once a day  torsemide 20 mg oral tablet: 2 tab(s) orally once a day

## 2022-01-18 NOTE — DISCHARGE NOTE PROVIDER - HOSPITAL COURSE
61 yr old male with CAD, dilated cardiomyopathy, s/p ICD, atrial flutter on Eliquis, substance abuse was admitted initially from 12/24 for decompensated CHF, was on Milrinone and Bumex drips, left AMA on 12/31 and returned to the ED later in the day as he had to take care of personal matter. He reported shortness of breath and right arm and foot pain on presentation. Cardiology and Heart Failure specialist were consulted, he was placed on oral Bumex., subsequently transitioned to Bumex gtt. Bumex drip stopped on 1/12. Stopped  Milrinone 1/14 transitioned to oral Torsemide , metoprolol . Counseling given re diet and medication compliance .Not candidate for advanced therapies due to smoking/cocaine use history and non-compliance. Patient reported he was recently evicted from his apartment and is homeless, social work consult placed. Seen by PT and recommend  CHAPINCITO. The patient is medically stable for discharge.   Vital Signs Last 24 Hrs  T(C): 36.4 (18 Jan 2022 10:06), Max: 36.6 (17 Jan 2022 20:00)  T(F): 97.6 (18 Jan 2022 10:06), Max: 97.8 (17 Jan 2022 20:00)  HR: 70 (18 Jan 2022 10:06) (65 - 75)  BP: 115/82 (18 Jan 2022 10:06) (100/67 - 115/82)  BP(mean): --  RR: 16 (18 Jan 2022 10:06) (16 - 18)  SpO2: 95% (18 Jan 2022 05:00) (93% - 95%)        18 Jan 2022 08:18    127    |  85     |  99.1   ----------------------------<  192    5.1     |  25.0   |  1.72     Ca    9.0        18 Jan 2022 08:18  Phos  5.1       18 Jan 2022 08:18  Mg     2.7       18 Jan 2022 08:18        CAPILLARY BLOOD GLUCOSE      CONSTITUTIONAL: NAD  ENMT: Moist oral mucosa, no pharyngeal injection or exudates  RESPIRATORY: Normal respiratory effort; lungs are clear to auscultation bilaterally  CARDIOVASCULAR: Regular rate and rhythm, normal S1 and S2  ABDOMEN: Nontender to palpation, normoactive bowel sounds, no rebound/guarding;   MUSCLOSKELETAL: no pretibial edema ,  no clubbing or cyanosis of digits; no joint swelling or tenderness to palpation  PSYCH: A+O to person, place, and time; affect appropriate  NEUROLOGY: CN 2-12 are intact and symmetric; no gross sensory deficits;

## 2022-01-18 NOTE — DISCHARGE NOTE NURSING/CASE MANAGEMENT/SOCIAL WORK - NSDCVIVACCINE_GEN_ALL_CORE_FT
influenza, injectable, quadrivalent, preservative free; 27-Nov-2016 15:07; Tala Wynne (RN); [employer]; [id number]; IntraMuscular; Deltoid Right.; 0.5 milliLiter(s); VIS (VIS Published: 07-Aug-2015, VIS Presented: 27-Nov-2016);

## 2022-02-11 NOTE — H&P ADULT - TIME BILLING
75minutes spent on this visit, 50% visit time spent in care co-ordination with other attendings and counselling patient ,writing admission orders ( see complete and current orders and order section) ,requesting necessary consults ,informing family about status & plan of care .I have discussed care plan with [facility], [facility] wellness/admitting /nursing   department ,outpatient PCP , hospital consultants , ER physician & med staff .

## 2022-02-11 NOTE — CONSULT NOTE ADULT - CONSULT REASON
ckd and ki
dysp
Acute hypoxic respiratory failure
Acute on chronic systolic heart failure
resp distress  THC use  homeless  sob  melendez  weakness

## 2022-02-11 NOTE — ED PROVIDER NOTE - OBJECTIVE STATEMENT
61 yr old male with CAD, dilated cardiomyopathy, s/p ICD, atrial flutter on Eliquis, substance abuse was admitted initially from 12/24 for decompensated CHF, was on Milrinone and Bumex drips, left AMA on 12/31 and returned to the ED later in the day as he had to take care of personal matter. He reported shortness of breath and right arm and foot pain on presentation. Cardiology and Heart Failure specialist were consulted, he was placed on oral Bumex., subsequently transitioned to Bumex gtt. Bumex drip stopped on 1/12. Stopped  Milrinone 1/14 transitioned to oral Torsemide , metoprolol . Counseling given re diet and medication compliance .Not candidate for advanced therapies due to smoking/cocaine use history and non-compliance. Patient reported he was recently evicted from his apartment and is homeless, social work consult placed. Seen by PT and recommend  CHAPINCITO. The patient is medically stable for discharge. 61 yr old male with CAD, dilated cardiomyopathy, s/p ICD, atrial flutter on Eliquis, substance abuse was admitted initially from 12/24 for decompensated CHF, was on Milrinone and Bumex drips, left AMA on 12/31 and returned to the ED later in the day as he had to take care of personal matter. He reported shortness of breath and right arm and foot pain on presentation. Cardiology and Heart Failure specialist were consulted, he was placed on oral Bumex., subsequently transitioned to Bumex gtt. Bumex drip stopped on 1/12. Stopped  Milrinone 1/14 transitioned to oral Torsemide , metoprolol . Counseling given re diet and medication compliance .Not candidate for advanced therapies due to smoking/cocaine use history and non-compliance. Patient reported he was recently evicted from his apartment and is homeless, social work consult placed. Seen by PT and recommend  CHAPINCITO. The patient was medically stable for discharge.  Subsequently pt developed COVID-19 infection was admitted 61 yr old male with CAD, dilated cardiomyopathy, s/p ICD, atrial flutter on Eliquis, substance abuse was admitted initially from 12/24 for decompensated CHF, was on Milrinone and Bumex drips, left AMA on 12/31 and returned to the ED later in the day as he had to take care of personal matter. He reported shortness of breath and right arm and foot pain on presentation. Cardiology and Heart Failure specialist were consulted, he was placed on oral Bumex., subsequently transitioned to Bumex gtt. Bumex drip stopped on 1/12. Stopped  Milrinone 1/14 transitioned to oral Torsemide , metoprolol . Counseling given re diet and medication compliance .Not candidate for advanced therapies due to smoking/cocaine use history and non-compliance. Patient reported he was recently evicted from his apartment and is homeless, social work consult placed. Seen by PT and recommend  CHAPINCITO. The patient was medically stable for discharge.  Subsequently pt developed COVID-19 infection was admitted to [facility] and discharged a few days ago, now returns to ER because of increasing sob and fevers.

## 2022-02-11 NOTE — CONSULT NOTE ADULT - ASSESSMENT
61 yr old male with CAD, dilated cardiomyopathy, s/p ICD, atrial flutter on Eliquis, substance abuse was admitted initially from 12/24 for decompensated CHF, was on Milrinone and Bumex drips, left AMA on 12/31 and returned to the ED later in the day as he had to take care of personal matter. He reported shortness of breath and right arm and foot pain on presentation. Cardiology and Heart Failure specialist were consulted, he was placed on oral Bumex., subsequently transitioned to Bumex gtt. Bumex drip stopped on 1/12. Stopped  Milrinone 1/14 transitioned to oral Torsemide , metoprolol . Counseling given re diet and medication compliance .Not candidate for advanced therapies due to smoking/cocaine use history and non-compliance. Patient reported he was recently evicted from his apartment and is homeless, social work consult placed. Seen by PT and recommend  CHAPINCITO. The patient was medically stable for discharge.  Subsequently pt developed COVID-19 infection was admitted to [facility] and discharged a few days ago, now returns to ER because of increasing sob and fevers.    remains COVID positive  Hypoxemia - resp distress  CKD -   Cardiomyopathy - Systolic CHF  Non - Domicile  AF hx  Substance Use Disorder     61 yr old male with CAD, dilated cardiomyopathy, s/p ICD, atrial flutter on Eliquis, substance abuse was admitted initially from 12/24 for decompensated CHF, was on Milrinone and Bumex drips, left AMA on 12/31 and returned to the ED later in the day as he had to take care of personal matter. He reported shortness of breath and right arm and foot pain on presentation. Cardiology and Heart Failure specialist were consulted, he was placed on oral Bumex., subsequently transitioned to Bumex gtt. Bumex drip stopped on 1/12. Stopped  Milrinone 1/14 transitioned to oral Torsemide , metoprolol . Counseling given re diet and medication compliance .Not candidate for advanced therapies due to smoking/cocaine use history and non-compliance. Patient reported he was recently evicted from his apartment and is homeless, social work consult placed. Seen by PT and recommend  CHAPINCITO. The patient was medically stable for discharge.  Subsequently pt developed COVID-19 infection was admitted to [facility] and discharged a few days ago, now returns to ER because of increasing sob and fevers.    remains COVID positive  Hypoxemia - resp distress  CKD -   Cardiomyopathy - Systolic CHF  Non - Domicile  AF hx  Substance Use Disorder    old records reviewed  tox screen reviewed  pt is homeless at present - no immediate family  uses [facility] - THC - Smoker - Opioids   remains Covid Positive  advanced Heart disease and end stage CHF  recent hospitalization at [facility]  fio2 support  counseling  emotional support  SW involvement  SBIRT  assist with needs  prognosis guarded

## 2022-02-11 NOTE — H&P ADULT - ASSESSMENT
61 yr old male with CAD, dilated cardiomyopathy, s/p ICD, atrial flutter on Eliquis, substance abuse was admitted initially from 12/24 for decompensated CHF, was on Milrinone and Bumex drips, left AMA on 12/31 and returned to the ED later in the day as he had to take care of personal matter. He reported shortness of breath and right arm and foot pain on presentation. Cardiology and Heart Failure specialist were consulted, he was placed on oral Bumex., subsequently transitioned to Bumex gtt. Bumex drip stopped on 1/12. Stopped  Milrinone 1/14 transitioned to oral Torsemide , metoprolol . Counseling given re diet and medication compliance .Not candidate for advanced therapies due to smoking/cocaine use history and non-compliance. Patient reported he was recently evicted from his apartment and is homeless, social work consult placed. Seen by PT and recommend  [facility]. The patient was medically stable for discharge.  Subsequently pt developed COVID-19 infection was admitted to [facility] and discharged a few days ago, now returns to ER from rehab  because of increasing sob and fevers .Found to have positive COVID test ,seen by ID & pulm consult requested Palliative care consult requested ,to discuss advance directives and complete MOLST

## 2022-02-11 NOTE — H&P ADULT - NSICDXPASTSURGICALHX_GEN_ALL_CORE_FT
PAST SURGICAL HISTORY:  No significant past surgical history      PAST SURGICAL HISTORY:  AICD (automatic cardioverter/defibrillator) present     Cardiac LV ejection fraction 10-20%

## 2022-02-11 NOTE — CONSULT NOTE ADULT - SUBJECTIVE AND OBJECTIVE BOX
History of Present Illness: The patient is a 61 year old male with a history of CAD, chronic systolic heart failure s/p ICD, atrial flutter s/p DCCV, substance abuse, CKD who presents with shortness of breath and fevers. He has had multiple recent admissions for decompensated and end stage heart failure. He was hospitalized at [facility] for COVID and discharged earlier in the week.    Past Medical/Surgical History:  CAD, chronic systolic heart failure s/p ICD, atrial flutter s/p DCCV, substance abuse, CKD     Medications:  Home Medications:  acetaminophen 325 mg oral tablet: 2 tab(s) orally every 6 hours, As needed, Temp greater or equal to 38C (100.4F), Mild Pain (1 - 3) (18 Jan 2022 12:51)  aluminum hydroxide-magnesium hydroxide 200 mg-200 mg/5 mL oral suspension: 30 milliliter(s) orally every 4 hours, As needed, Dyspepsia (18 Jan 2022 12:51)  budesonide-formoterol 160 mcg-4.5 mcg/inh inhalation aerosol: 2 puff(s) inhaled 2 times a day (18 Jan 2022 12:51)  gabapentin 100 mg oral capsule: 1 cap(s) orally 3 times a day (18 Jan 2022 12:51)  melatonin 3 mg oral tablet: 1 tab(s) orally once a day (at bedtime), As needed, Insomnia (18 Jan 2022 12:51)  metoprolol succinate 100 mg oral tablet, extended release: 1 tab(s) orally once a day (18 Jan 2022 12:51)  Multiple Vitamins oral tablet: 1 tab(s) orally once a day (18 Jan 2022 12:51)  spironolactone 25 mg oral tablet: 1 tab(s) orally once a day (18 Jan 2022 12:51)  torsemide 20 mg oral tablet: 2 tab(s) orally once a day (18 Jan 2022 12:51)      Family History: Non-contributory family history of premature cardiovascular atherosclerotic disease    Social History: No tobacco, alcohol or drug use    Review of Systems:  General: No fevers, chills, weight gain  Skin: No rashes, color changes  Cardiovascular: No chest pain, orthopnea  Respiratory: No shortness of breath, cough  Gastrointestinal: No nausea, abdominal pain  Genitourinary: No incontinence, pain with urination  Musculoskeletal: No pain, swelling, decreased range of motion  Neurological: No headache, weakness  Psychiatric: No depression, anxiety  Endocrine: No weight gain, increased thirst  All other systems are comprehensively negative.    Physical Exam:  Vitals:        Vital Signs Last 24 Hrs  T(C): 37.4 (11 Feb 2022 12:53), Max: 37.5 (11 Feb 2022 09:20)  T(F): 99.3 (11 Feb 2022 12:53), Max: 99.5 (11 Feb 2022 09:20)  HR: 78 (11 Feb 2022 12:53) (78 - 101)  BP: 102/68 (11 Feb 2022 12:53) (102/68 - 118/85)  BP(mean): --  RR: 20 (11 Feb 2022 12:53) (20 - 20)  SpO2: 94% (11 Feb 2022 12:53) (93% - 100%)  General: NAD  HEENT: MMM  Neck: No JVD, no carotid bruit  Lungs: CTAB  CV: RRR, nl S1/S2, no M/R/G  Abdomen: S/NT/ND, +BS  Extremities: No LE edema, no cyanosis  Neuro: AAOx3, non-focal  Skin: No rash    Labs:                        14.5   17.56 )-----------( 204      ( 11 Feb 2022 10:10 )             44.0     02-11    129<L>  |  99  |  55<H>  ----------------------------<  164<H>  5.7<H>   |  21<L>  |  1.60<H>    Ca    8.0<L>      11 Feb 2022 10:10    TPro  6.4  /  Alb  2.5<L>  /  TBili  2.7<H>  /  DBili  x   /  AST  55<H>  /  ALT  53  /  AlkPhos  363<H>  02-11        PT/INR - ( 11 Feb 2022 10:10 )   PT: 31.7 sec;   INR: 2.85 ratio         PTT - ( 11 Feb 2022 10:10 )  PTT:38.7 sec    ECG: NSR, LAD, IVCD, LVH with secondary repolarization abnormality     History of Present Illness: The patient is a 61 year old male with a history of CAD, chronic systolic heart failure s/p ICD, atrial flutter s/p DCCV, substance abuse, CKD who presents with shortness of breath and fevers. He has had multiple recent admissions for decompensated and end stage heart failure. He was hospitalized at [facility] for COVID and discharged earlier in the week.    Past Medical/Surgical History:  CAD, chronic systolic heart failure s/p ICD, atrial flutter s/p DCCV, substance abuse, CKD     Medications:  Home Medications:  acetaminophen 325 mg oral tablet: 2 tab(s) orally every 6 hours, As needed, Temp greater or equal to 38C (100.4F), Mild Pain (1 - 3) (18 Jan 2022 12:51)  aluminum hydroxide-magnesium hydroxide 200 mg-200 mg/5 mL oral suspension: 30 milliliter(s) orally every 4 hours, As needed, Dyspepsia (18 Jan 2022 12:51)  budesonide-formoterol 160 mcg-4.5 mcg/inh inhalation aerosol: 2 puff(s) inhaled 2 times a day (18 Jan 2022 12:51)  gabapentin 100 mg oral capsule: 1 cap(s) orally 3 times a day (18 Jan 2022 12:51)  melatonin 3 mg oral tablet: 1 tab(s) orally once a day (at bedtime), As needed, Insomnia (18 Jan 2022 12:51)  metoprolol succinate 100 mg oral tablet, extended release: 1 tab(s) orally once a day (18 Jan 2022 12:51)  Multiple Vitamins oral tablet: 1 tab(s) orally once a day (18 Jan 2022 12:51)  spironolactone 25 mg oral tablet: 1 tab(s) orally once a day (18 Jan 2022 12:51)  torsemide 20 mg oral tablet: 2 tab(s) orally once a day (18 Jan 2022 12:51)      Family History: Non-contributory family history of premature cardiovascular atherosclerotic disease    Social History: No tobacco, alcohol or drug use    Review of Systems:  General: No fevers, chills, weight gain  Skin: No rashes, color changes  Cardiovascular: No chest pain, orthopnea  Respiratory: No shortness of breath, cough  Gastrointestinal: No nausea, abdominal pain  Genitourinary: No incontinence, pain with urination  Musculoskeletal: No pain, swelling, decreased range of motion  Neurological: No headache, weakness  Psychiatric: No depression, anxiety  Endocrine: No weight gain, increased thirst  All other systems are comprehensively negative.    Physical Exam:  Vitals:        Vital Signs Last 24 Hrs  T(C): 37.4 (11 Feb 2022 12:53), Max: 37.5 (11 Feb 2022 09:20)  T(F): 99.3 (11 Feb 2022 12:53), Max: 99.5 (11 Feb 2022 09:20)  HR: 78 (11 Feb 2022 12:53) (78 - 101)  BP: 102/68 (11 Feb 2022 12:53) (102/68 - 118/85)  BP(mean): --  RR: 20 (11 Feb 2022 12:53) (20 - 20)  SpO2: 94% (11 Feb 2022 12:53) (93% - 100%)  General: NAD  HEENT: MMM  Neck: No JVD, no carotid bruit  Lungs: CTAB  CV: RRR, nl S1/S2, no M/R/G  Abdomen: S/NT/ND, +BS  Extremities: 2+ LE edema, no cyanosis  Neuro: AAOx3, non-focal  Skin: No rash    Labs:                        14.5   17.56 )-----------( 204      ( 11 Feb 2022 10:10 )             44.0     02-11    129<L>  |  99  |  55<H>  ----------------------------<  164<H>  5.7<H>   |  21<L>  |  1.60<H>    Ca    8.0<L>      11 Feb 2022 10:10    TPro  6.4  /  Alb  2.5<L>  /  TBili  2.7<H>  /  DBili  x   /  AST  55<H>  /  ALT  53  /  AlkPhos  363<H>  02-11        PT/INR - ( 11 Feb 2022 10:10 )   PT: 31.7 sec;   INR: 2.85 ratio         PTT - ( 11 Feb 2022 10:10 )  PTT:38.7 sec    ECG: NSR, LAD, IVCD, LVH with secondary repolarization abnormality

## 2022-02-11 NOTE — CONSULT NOTE ADULT - ASSESSMENT
The patient is a 61 year old male with a history of CAD, chronic systolic heart failure s/p ICD, atrial flutter s/p DCCV, substance abuse, CKD who is admitted with acute on chronic systolic heart failure, COVID.    Plan:  - ECG with IVCD and LVH related abnormality  - BNP significantly elevated at 17[address] - was [place] [address] on discharge last month  - Echo 11/21 with severely reduced LV and RV function, mod/severe MR, mod TR  - CXR with bilateral pleural effusions  - Last admission required bumetanide and milrinone drips  - Start bumetanide 2 mg IV q12h - if poor urine output then may need to change to bumetanide drip  - Hold metoprolol for now  - Hold spironolactone due to hyperkalemia  - Continue amiodarone 200 mg daily  - Continue apixaban 5 mg bid  - Continue aspirin 81 mg daily  - Continue atorvastatin 40 mg daily  - COVID-19 positive  - On dexamethasone  - Due to substance abuse and poor compliance, the patient was deemed by the heart failure team at [facility] to not be a candidate for advanced heart failure therapies. Given that the patient is end stage heart failure with multiple recent admissions for decompensated heart failure, palliative care options are likely the most suitable for the patient.

## 2022-02-11 NOTE — CONSULT NOTE ADULT - ASSESSMENT
61 yr old male ,known to me from [facility]  with med hx of  CAD, dilated cardiomyopathy, s/p ICD, atrial flutter on Eliquis, substance abuse was admitted initially from 12/24 for decompensated CHF, was on Milrinone and Bumex drips, left AMA on 12/31 and returned to the ED later in the day as he had to take care of personal matter. He reported shortness of breath and right arm and foot pain on presentation. Cardiology and Heart Failure specialist were consulted, he was placed on oral Bumex., subsequently transitioned to Bumex gtt. Bumex drip stopped on 1/12. Stopped  Milrinone 1/14 transitioned to oral Torsemide , metoprolol . Counseling given re diet and medication compliance .Not candidate for advanced therapies due to smoking/cocaine use history and non-compliance. Patient reported he was recently evicted from his apartment and is homeless, social work consult placed. Seen by PT and recommend  [facility]. The patient was medically stable for discharge.  Subsequently pt developed COVID-19 infection was admitted to [facility] and discharged a few days ago, now returns to ER from rehab  because of increasing sob and fevers .Found to have positive COVID test ,seen by ID & pulm consult requested Palliative care consult requested ,to discuss advance directives and complete MOLST  (11 Feb 2022 13:41)      ACUTE RENAL FAILURE:   Serum creatinine is  at 1.6    , approximating GFR decreased .   There is no progression . No uremic symptoms  No evidence of anemia .  Fluid status stable.  Will continue to avoid nephrotoxic drugs.  Patient remains asymptomatic.   Continue current therapy.  hold  diuretic.  hold   ACE inhibitor.  hold   ARB.  Additional evaluation:   ECG,    echocardiogram,     CXR,  will obtained recent   renal ultrasound to evalaute kidney size and possible stones ,    BP monitoring,continue current antihypertensive meds, low salt diet,followup with PMD in 1-2 weeks

## 2022-02-11 NOTE — CONSULT NOTE ADULT - SUBJECTIVE AND OBJECTIVE BOX
Patient is a 61y Male whom presented to the hospital with     PAST MEDICAL & SURGICAL HISTORY:  Heart failure, systolic    CAD (coronary artery disease)    Hypertension    Nonischemic cardiomyopathy    COPD, moderate    2019 novel coronavirus disease (COVID-19)    Substance abuse    HLD (hyperlipidemia)    Cardiac LV ejection fraction 10-20%    AICD (automatic cardioverter/defibrillator) present    Cardiac LV ejection fraction 10-20%        MEDICATIONS  (STANDING):  apixaban 5 milliGRAM(s) Oral every 12 hours  atorvastatin 40 milliGRAM(s) Oral at bedtime  buDESOnide    Inhalation Suspension 0.5 milliGRAM(s) Inhalation two times a day  buMETAnide Injectable 2 milliGRAM(s) IV Push every 12 hours  dexAMETHasone     Tablet 6 milliGRAM(s) Oral daily  gabapentin 100 milliGRAM(s) Oral three times a day  lactobacillus acidophilus 1 Tablet(s) Oral two times a day  pantoprazole    Tablet 40 milliGRAM(s) Oral before breakfast      Allergies    No Known Allergies    Intolerances    pork (Other)      SOCIAL HISTORY:  Denies ETOh,Smoking,     FAMILY HISTORY:  FH: lung cancer        REVIEW OF SYSTEMS:    CONSTITUTIONAL: No weakness, fevers or chills  EYES/ENT: No visual changes;  no throat pain   NECK: No pain or stiffness  RESPIRATORY: No cough, wheezing, hemoptysis; No shortness of breath  CARDIOVASCULAR: No chest pain or palpitations  GASTROINTESTINAL: No abdominal or epigastric pain. No nausea, vomiting,     No diarrhea or constipation. No melena   GENITOURINARY: No dysuria, frequency or hematuria  NEUROLOGICAL: No numbness or weakness  SKIN: dry      VITAL:  T(C): , Max: 37.5 (02-11-22 @ 09:20)  T(F): , Max: 99.5 (02-11-22 @ 09:20)  HR: 82 (02-11-22 @ 19:00)  BP: 102/68 (02-11-22 @ 12:53)  BP(mean): --  RR: 20 (02-11-22 @ 19:00)  SpO2: 100% (02-11-22 @ 19:00)  Wt(kg): --    I and O's:    Height (cm): 182.9 (02-11 @ 09:20)  Weight (kg): 72.6 (02-11 @ 09:24)  BMI (kg/m2): 21.7 (02-11 @ 09:24)  BSA (m2): 1.94 (02-11 @ 09:24)    PHYSICAL EXAM:    Constitutional: NAD  HEENT: conjunctive   clear   Neck:  No JVD  Respiratory: CTAB  Cardiovascular: S1 and S2  Gastrointestinal: BS+, soft, NT/ND  Extremities: No peripheral edema  Neurological: A/O x 3, no focal deficits  Psychiatric: Normal mood, normal affect  : No Bailey  Skin: No rashes  Access: Not applicable    LABS:                        14.5   17.56 )-----------( 204      ( 11 Feb 2022 10:10 )             44.0     02-11    129<L>  |  99  |  55<H>  ----------------------------<  164<H>  5.7<H>   |  21<L>  |  1.60<H>    Ca    8.0<L>      11 Feb 2022 10:10    TPro  6.4  /  Alb  2.5<L>  /  TBili  2.7<H>  /  DBili  x   /  AST  55<H>  /  ALT  53  /  AlkPhos  363<H>  02-11      Urine Studies:          RADIOLOGY & ADDITIONAL STUDIES:                   Patient is a 61y Male whom presented to the hospital with ckd and ki     PAST MEDICAL & SURGICAL HISTORY:  Heart failure, systolic    CAD (coronary artery disease)    Hypertension    Nonischemic cardiomyopathy    COPD, moderate    2019 novel coronavirus disease (COVID-19)    Substance abuse    HLD (hyperlipidemia)    Cardiac LV ejection fraction 10-20%    AICD (automatic cardioverter/defibrillator) present    Cardiac LV ejection fraction 10-20%        MEDICATIONS  (STANDING):  apixaban 5 milliGRAM(s) Oral every 12 hours  atorvastatin 40 milliGRAM(s) Oral at bedtime  buDESOnide    Inhalation Suspension 0.5 milliGRAM(s) Inhalation two times a day  buMETAnide Injectable 2 milliGRAM(s) IV Push every 12 hours  dexAMETHasone     Tablet 6 milliGRAM(s) Oral daily  gabapentin 100 milliGRAM(s) Oral three times a day  lactobacillus acidophilus 1 Tablet(s) Oral two times a day  pantoprazole    Tablet 40 milliGRAM(s) Oral before breakfast      Allergies    No Known Allergies    Intolerances    pork (Other)      SOCIAL HISTORY:  Denies ETOh,Smoking,     FAMILY HISTORY:  FH: lung cancer        REVIEW OF SYSTEMS:  unable to obtained a good review system        VITAL:  T(C): , Max: 37.5 (02-11-22 @ 09:20)  T(F): , Max: 99.5 (02-11-22 @ 09:20)  HR: 82 (02-11-22 @ 19:00)  BP: 102/68 (02-11-22 @ 12:53)  BP(mean): --  RR: 20 (02-11-22 @ 19:00)  SpO2: 100% (02-11-22 @ 19:00)  Wt(kg): --    I and O's:    Height (cm): 182.9 (02-11 @ 09:20)  Weight (kg): 72.6 (02-11 @ 09:24)  BMI (kg/m2): 21.7 (02-11 @ 09:24)  BSA (m2): 1.94 (02-11 @ 09:24)    PHYSICAL EXAM:    Constitutional: NAD  HEENT: conjunctive   clear   Neck:  No JVD  Respiratory: decrease bs b/l   Cardiovascular: S1 and S2  Gastrointestinal: BS+, soft,   Extremities: No peripheral edema    LABS:                        14.5   17.56 )-----------( 204      ( 11 Feb 2022 10:10 )             44.0     02-11    129<L>  |  99  |  55<H>  ----------------------------<  164<H>  5.7<H>   |  21<L>  |  1.60<H>    Ca    8.0<L>      11 Feb 2022 10:10    TPro  6.4  /  Alb  2.5<L>  /  TBili  2.7<H>  /  DBili  x   /  AST  55<H>  /  ALT  53  /  AlkPhos  363<H>  02-11      Urine Studies:          RADIOLOGY & ADDITIONAL STUDIES:

## 2022-02-11 NOTE — ED ADULT TRIAGE NOTE - NS ED NURSE AMBULANCES
[facility] 42 yo obese male with pmh COPD, chronic ETOH, Lupus, AFIB, chronic abd pain, chronic diarrhea/constipation, presents with dizziness. Pt also with LE edema x 1 week seen by PMD placed on Lasix. Being managed for ETOH withdrawal, electrolytes imbalance, Rhabdo and SANDRA. Initially IV fluid given, later dc-ed because of CHF. Pt declined TTE as he wants to follow up with his cardiology. Electrolytes were replaced. Ask to rpt before discharge but Pt wants to get dc-ed before checking labs. Pt signed AMA. script sent to the pharmacy.

## 2022-02-11 NOTE — H&P ADULT - PROBLEM SELECTOR PLAN 2
Monitor for fevers and hypoxia ,monitor  route of 02 and saturation closely ,trend CBC  AC plan as  per pulmonologist Dr Goins   Critical period for decompensation  (7-14 days post symptom onset), Continue avoid aerosolizing procedures, NSAIDs   -isolation precautions per facility protocol  -Try to avoid excessive blood draws, blood cultures and frequent CXRs  -monitor biomarkers- CBC w diff  for NLR <3 low vs >5 high) Ferritin (lower risk <450 vs >850) CRP (low risk <2 and higher risk >6) and LDH, D Dimer, procalcitonin serum levels   -antibiotics only if superimosed  bacterial process is suspected   -Steroids short course (usually  5 days)  --for hypoxia/ARDS /shock

## 2022-02-11 NOTE — ED PROVIDER NOTE - CARE PLAN
Principal Discharge DX:	Acute respiratory failure with hypoxia  Secondary Diagnosis:	2019 novel coronavirus disease (COVID-19)   1

## 2022-02-11 NOTE — ED PROVIDER NOTE - CLINICAL SUMMARY MEDICAL DECISION MAKING FREE TEXT BOX
pt with covid 19 infection, now with hypoxia and fever, will need admission for continued O2 support

## 2022-02-11 NOTE — H&P ADULT - NSHPLABSRESULTS_GEN_ALL_CORE
EXAM:  ECHO TRANSESOPHAGEAL    EXAM:  DOPPLER ECHO COMP W SPECTRAL    EXAM:  DOPPLER ECHOCARD COLOR FLOW      PROCEDURE DATE:  Nov 22 2021   .      INTERPRETATION:  TRANSESOPHAGEAL ECHOCARDIOGRAM REPORT        Patient Name:   DOROTHY VOSS PatientLocation: Inpatient  Medical Rec #:  [id number]       Accession #:      [id number]  Account #:                     Height:           72.0 in 182.9 cm  YOB: 1960      Weight:           160.0 lb 72.58 kg  Patient Age:    61 years       BSA:             1.94 m²  Patient Gender: M              BP:               63/29 mmHg      Date of Exam:        11/22/2021 6:34:52 PM  Sonographer:         Merlin Leyva  Referring Physician: Romie Tran MD  Copy report sent to: [id number] Aracelis Thompson MD    Procedure:   Transesophageal Echocardiogram.  Indications: Unspecified atrial flutter - I48.92  Diagnosis:   Cardiomyopathy, unspecified - I42.9    SPECTRAL DOPPLER ANALYSIS (where applicable):    PHYSICIAN INTERPRETATION:  Left Ventricle:  Global LV systolic function was severely decreased. Left ventricular ejection fraction, by visual estimation, is <20%.  In comparison to the previous echocardiogram(s): There are no prior VANESSA studies on this patient for comparison purposes.      Summary:   1. Very limited study due to patient hemodynamic instability.   2. Left ventricular ejection fraction, by visual estimation, is <20% with dilated cardiomyopathy   3. Limited visualization of the left atrial appendage, no evidence of thrombus.    < end of copied text >

## 2022-02-11 NOTE — H&P ADULT - NSCORESITESY/N_GEN_A_CORE_RD
Yes [Grade: ____] : Grade: [unfilled] [Has had sexual intercourse] : has had sexual intercourse [Vaginal] : vaginal [de-identified] : Lives with father [de-identified] : Last sex 11/9/18 (coitarche), used condom, never had testing for sexually transmitted infections

## 2022-02-11 NOTE — ED PROVIDER NOTE - PHYSICAL EXAMINATION
Gen: alert, NAD  HEENT:  NC/AT, PERR, no exophthalmos  CV:  well perfused, rrr, moderate pedal edema (chronic)  Pulm:  increased RR, normal I/E ratio and chest excursion  Abd: s/nt/nd  MSK: moving all extremities  Neuro:  non-focal  Skin:  visualized areas intact  Psych: AOx3

## 2022-02-11 NOTE — H&P ADULT - HISTORY OF PRESENT ILLNESS
61 yr old male ,known to me from [facility]  with med hx of  CAD, dilated cardiomyopathy, s/p ICD, atrial flutter on Eliquis, substance abuse was admitted initially from 12/24 for decompensated CHF, was on Milrinone and Bumex drips, left AMA on 12/31 and returned to the ED later in the day as he had to take care of personal matter. He reported shortness of breath and right arm and foot pain on presentation. Cardiology and Heart Failure specialist were consulted, he was placed on oral Bumex., subsequently transitioned to Bumex gtt. Bumex drip stopped on 1/12. Stopped  Milrinone 1/14 transitioned to oral Torsemide , metoprolol . Counseling given re diet and medication compliance .Not candidate for advanced therapies due to smoking/cocaine use history and non-compliance. Patient reported he was recently evicted from his apartment and is homeless, social work consult placed. Seen by PT and recommend  [facility]. The patient was medically stable for discharge.  Subsequently pt developed COVID-19 infection was admitted to [facility] and discharged a few days ago, now returns to ER from rehab  because of increasing sob and fevers .Found to have positive COVID test ,seen by ID & pulm consult requested Palliative care consult requested ,to discuss advance directives and complete MOLST

## 2022-02-11 NOTE — ED ADULT NURSE NOTE - OBJECTIVE STATEMENT
pt to er by ambulance sent from facility for eval sob upon arrival is awake and alert resp labored placed on nrm o2 sat on ra 85 rectal temp 102 given tylenol 2 stage 2 sacral ulcer noted to buttocks approx dime sized right bittocks and mid sacral dressings applied trace pedal edema noted

## 2022-02-11 NOTE — H&P ADULT - NSICDXPASTMEDICALHX_GEN_ALL_CORE_FT
PAST MEDICAL HISTORY:  CAD (coronary artery disease)     Heart failure, systolic     Hypertension     Nonischemic cardiomyopathy      PAST MEDICAL HISTORY:  2019 novel coronavirus disease (COVID-19)     CAD (coronary artery disease)     Cardiac LV ejection fraction 10-20%     COPD, moderate     Heart failure, systolic     HLD (hyperlipidemia)     Hypertension     Nonischemic cardiomyopathy     Substance abuse

## 2022-02-11 NOTE — CONSULT NOTE ADULT - SUBJECTIVE AND OBJECTIVE BOX
DOROTHY VOSS    [facility] APER 23    Allergies    No Known Allergies    Intolerances    pork (Other)      PAST MEDICAL & SURGICAL HISTORY:  Heart failure, systolic    CAD (coronary artery disease)    Hypertension    Nonischemic cardiomyopathy    COPD, moderate    2019 novel coronavirus disease (COVID-19)    Substance abuse    HLD (hyperlipidemia)    Cardiac LV ejection fraction 10-20%    AICD (automatic cardioverter/defibrillator) present    Cardiac LV ejection fraction 10-20%        FAMILY HISTORY:  FH: lung cancer        Home Medications:  acetaminophen 325 mg oral tablet: 2 tab(s) orally every 6 hours, As needed, Temp greater or equal to 38C (100.4F), Mild Pain (1 - 3) (11 Feb 2022 13:52)  Aquaphor Healing topical ointment: Apply topically to affected area once a day (11 Feb 2022 13:52)  Bacid (LAC) oral capsule: 2 cap(s) orally once a day (11 Feb 2022 13:52)  DuoNeb 0.5 mg-2.5 mg/3 mL inhalation solution: 3 milliliter(s) inhaled 4 times a day, As Needed (11 Feb 2022 13:52)  furosemide 20 mg oral tablet: 1 tab(s) orally once a day (11 Feb 2022 13:52)  gabapentin 100 mg oral capsule: 1 cap(s) orally 3 times a day (11 Feb 2022 13:52)  melatonin 3 mg oral tablet: 1 tab(s) orally once a day (at bedtime), As needed, Insomnia (11 Feb 2022 13:52)  metoprolol succinate 100 mg oral tablet, extended release: 1 tab(s) orally once a day (11 Feb 2022 13:52)  simethicone 80 mg oral tablet: 2 tab(s) orally every 6 hours, As Needed (11 Feb 2022 13:52)  Symbicort 160 mcg-4.5 mcg/inh inhalation aerosol: 2 puff(s) inhaled 2 times a day (11 Feb 2022 13:52)  Ventolin HFA 90 mcg/inh inhalation aerosol: 2 puff(s) inhaled 3 times a day for 5 days. Start: 02/10/22 (11 Feb 2022 13:52)      MEDICATIONS  (STANDING):  apixaban 5 milliGRAM(s) Oral every 12 hours  atorvastatin 40 milliGRAM(s) Oral at bedtime  buDESOnide    Inhalation Suspension 0.5 milliGRAM(s) Inhalation two times a day  buMETAnide Injectable 2 milliGRAM(s) IV Push every 12 hours  dexAMETHasone     Tablet 6 milliGRAM(s) Oral daily  gabapentin 100 milliGRAM(s) Oral three times a day  lactobacillus acidophilus 1 Tablet(s) Oral two times a day  pantoprazole    Tablet 40 milliGRAM(s) Oral before breakfast    MEDICATIONS  (PRN):  acetaminophen     Tablet .. 650 milliGRAM(s) Oral every 6 hours PRN Temp greater or equal to 38C (100.4F), Mild Pain (1 - 3)  aluminum hydroxide/magnesium hydroxide/simethicone Suspension 30 milliLiter(s) Oral every 4 hours PRN Dyspepsia  melatonin 3 milliGRAM(s) Oral at bedtime PRN Insomnia  ondansetron Injectable 4 milliGRAM(s) IV Push every 8 hours PRN Nausea and/or Vomiting  simethicone 80 milliGRAM(s) Chew three times a day PRN Indigestion              Vital Signs Last 24 Hrs  T(C): 37.4 (11 Feb 2022 19:00), Max: 37.5 (11 Feb 2022 09:20)  T(F): 99.4 (11 Feb 2022 19:00), Max: 99.5 (11 Feb 2022 09:20)  HR: 82 (11 Feb 2022 19:00) (73 - 101)  BP: 102/68 (11 Feb 2022 12:53) (102/68 - 118/85)  BP(mean): --  RR: 20 (11 Feb 2022 19:00) (20 - 20)  SpO2: 100% (11 Feb 2022 19:00) (93% - 100%)              LABS:                        14.5   17.56 )-----------( 204      ( 11 Feb 2022 10:10 )             44.0     02-11    129<L>  |  99  |  55<H>  ----------------------------<  164<H>  5.7<H>   |  21<L>  |  1.60<H>    Ca    8.0<L>      11 Feb 2022 10:10    TPro  6.4  /  Alb  2.5<L>  /  TBili  2.7<H>  /  DBili  x   /  AST  55<H>  /  ALT  53  /  AlkPhos  363<H>  02-11    PT/INR - ( 11 Feb 2022 10:10 )   PT: 31.7 sec;   INR: 2.85 ratio         PTT - ( 11 Feb 2022 10:10 )  PTT:38.7 sec          WBC:  WBC Count: 17.56 K/uL (02-11 @ 10:10)      MICROBIOLOGY:  RECENT CULTURES:              PT/INR - ( 11 Feb 2022 10:10 )   PT: 31.7 sec;   INR: 2.85 ratio         PTT - ( 11 Feb 2022 10:10 )  PTT:38.7 sec    Sodium:  Sodium, Serum: 129 mmol/L (02-11 @ 10:10)      1.60 mg/dL 02-11 @ 10:10      Hemoglobin:  Hemoglobin: 14.5 g/dL (02-11 @ 10:10)      Platelets: Platelet Count - Automated: 204 K/uL (02-11 @ 10:10)      LIVER FUNCTIONS - ( 11 Feb 2022 10:10 )  Alb: 2.5 g/dL / Pro: 6.4 g/dL / ALK PHOS: 363 U/L / ALT: 53 U/L / AST: 55 U/L / GGT: x                 RADIOLOGY & ADDITIONAL STUDIES:      MICROBIOLOGY:  RECENT CULTURES:

## 2022-02-11 NOTE — CONSULT NOTE ADULT - SUBJECTIVE AND OBJECTIVE BOX
Date/Time Patient Seen:  		  Referring MD:   Data Reviewed	       Patient is a 61y old  Male who presents with a chief complaint of     Subjective/HPI     PAST MEDICAL & SURGICAL HISTORY:  Heart failure, systolic    CAD (coronary artery disease)    Hypertension    Nonischemic cardiomyopathy    No significant past surgical history          Medication list         MEDICATIONS  (STANDING):  acetaminophen     Tablet .. 650 milliGRAM(s) Oral once  cefepime   IVPB 2000 milliGRAM(s) IV Intermittent once    MEDICATIONS  (PRN):         Vitals log        ICU Vital Signs Last 24 Hrs  T(C): 37.5 (11 Feb 2022 09:20), Max: 37.5 (11 Feb 2022 09:20)  T(F): 99.5 (11 Feb 2022 09:20), Max: 99.5 (11 Feb 2022 09:20)  HR: 101 (11 Feb 2022 09:20) (101 - 101)  BP: 118/85 (11 Feb 2022 09:20) (118/85 - 118/85)  BP(mean): --  ABP: --  ABP(mean): --  RR: 20 (11 Feb 2022 09:20) (20 - 20)  SpO2: 100% (11 Feb 2022 09:20) (100% - 100%)           Input and Output:  I&O's Detail      Lab Data                        14.5   17.56 )-----------( 204      ( 11 Feb 2022 10:10 )             44.0                   Review of Systems	      Objective     Physical Examination        Pertinent Lab findings & Imaging      Bailey:  NO   Adequate UO     I&O's Detail           Discussed with:     Cultures:	        Radiology                             Date/Time Patient Seen:  		  Referring MD:   Data Reviewed	       Patient is a 61y old  Male who presents with a chief complaint of     Subjective/HPI  in bed  seen and examined  old records reviewed  imaging reviewed  ECHO reviewed  pt is homeless         PAST MEDICAL & SURGICAL HISTORY:  Heart failure, systolic    CAD (coronary artery disease)    Hypertension    Nonischemic cardiomyopathy    No significant past surgical history          Medication list         MEDICATIONS  (STANDING):  acetaminophen     Tablet .. 650 milliGRAM(s) Oral once  cefepime   IVPB 2000 milliGRAM(s) IV Intermittent once    MEDICATIONS  (PRN):         Vitals log        ICU Vital Signs Last 24 Hrs  T(C): 37.5 (11 Feb 2022 09:20), Max: 37.5 (11 Feb 2022 09:20)  T(F): 99.5 (11 Feb 2022 09:20), Max: 99.5 (11 Feb 2022 09:20)  HR: 101 (11 Feb 2022 09:20) (101 - 101)  BP: 118/85 (11 Feb 2022 09:20) (118/85 - 118/85)  BP(mean): --  ABP: --  ABP(mean): --  RR: 20 (11 Feb 2022 09:20) (20 - 20)  SpO2: 100% (11 Feb 2022 09:20) (100% - 100%)           Input and Output:  I&O's Detail      Lab Data                        14.5   17.56 )-----------( 204      ( 11 Feb 2022 10:10 )             44.0                   Review of Systems	  sob  melendez  weakness      Objective     Physical Examination    verbal  alert  head nc  head at  heart s1s2  lung dec BS      Pertinent Lab findings & Imaging      Bailey:  NO   Adequate UO     I&O's Detail           Discussed with:     Cultures:	        Radiology        Summary:   1. Very limited study due to patient hemodynamic instability.   2. Left ventricular ejection fraction, by visual estimation, is <20% with dilated cardiomyopathy   3. Limited visualization of the left atrial appendage, no evidence of thrombus.    Micah Lua DO Electronically signed on 11/22/2021 at 6:58:45 PM        *** Final ***              MISAEL ALBERTO   This document has been electronically signed. Nov 22 2021  6:34PM                       Date/Time Patient Seen:  		  Referring MD:   Data Reviewed	       Patient is a 61y old  Male who presents with a chief complaint of     Subjective/HPI  in bed  seen and examined  old records reviewed  imaging reviewed  ECHO reviewed  pt is homeless    61 yr old male with CAD, dilated cardiomyopathy, s/p ICD, atrial flutter on Eliquis, substance abuse was admitted initially from 12/24 for decompensated CHF, was on Milrinone and Bumex drips, left AMA on 12/31 and returned to the ED later in the day as he had to take care of personal matter. He reported shortness of breath and right arm and foot pain on presentation. Cardiology and Heart Failure specialist were consulted, he was placed on oral Bumex., subsequently transitioned to Bumex gtt. Bumex drip stopped on 1/12. Stopped  Milrinone 1/14 transitioned to oral Torsemide , metoprolol . Counseling given re diet and medication compliance .Not candidate for advanced therapies due to smoking/cocaine use history and non-compliance. Patient reported he was recently evicted from his apartment and is homeless, social work consult placed. Seen by PT and recommend  CHAPINCITO. The patient was medically stable for discharge.  Subsequently pt developed COVID-19 infection was admitted to [facility] and discharged a few days ago, now returns to ER because of increasing sob and fevers.     PAST MEDICAL & SURGICAL HISTORY:  Heart failure, systolic    CAD (coronary artery disease)    Hypertension    Nonischemic cardiomyopathy    No significant past surgical history          Medication list         MEDICATIONS  (STANDING):  acetaminophen     Tablet .. 650 milliGRAM(s) Oral once  cefepime   IVPB 2000 milliGRAM(s) IV Intermittent once    MEDICATIONS  (PRN):         Vitals log        ICU Vital Signs Last 24 Hrs  T(C): 37.5 (11 Feb 2022 09:20), Max: 37.5 (11 Feb 2022 09:20)  T(F): 99.5 (11 Feb 2022 09:20), Max: 99.5 (11 Feb 2022 09:20)  HR: 101 (11 Feb 2022 09:20) (101 - 101)  BP: 118/85 (11 Feb 2022 09:20) (118/85 - 118/85)  BP(mean): --  ABP: --  ABP(mean): --  RR: 20 (11 Feb 2022 09:20) (20 - 20)  SpO2: 100% (11 Feb 2022 09:20) (100% - 100%)           Input and Output:  I&O's Detail      Lab Data                        14.5   17.56 )-----------( 204      ( 11 Feb 2022 10:10 )             44.0         smoker  THC use  no family  homeless            Review of Systems	  sob  melendez  weakness      Objective     Physical Examination    verbal  alert  head nc  head at  heart s1s2  lung dec BS      Pertinent Lab findings & Imaging      Bailey:  NO   Adequate UO     I&O's Detail           Discussed with:     Cultures:	        Radiology        Summary:   1. Very limited study due to patient hemodynamic instability.   2. Left ventricular ejection fraction, by visual estimation, is <20% with dilated cardiomyopathy   3. Limited visualization of the left atrial appendage, no evidence of thrombus.    Micah Lua DO Electronically signed on 11/22/2021 at 6:58:45 PM        *** Final ***          Ventricular Rate 98 BPM    Atrial Rate 98 BPM    P-R Interval 204 ms    QRS Duration 140 ms    Q-T Interval 386 ms    QTC Calculation(Bazett) 492 ms    P Axis 73 degrees    R Axis -55 degrees    T Axis 93 degrees    Diagnosis Line Normal sinus rhythm  Possible Left atrial enlargement  Left axis deviation  Nonspecific intraventricular block  T wave abnormality, consider lateral ischemia    Confirmed by BILL MAY ([id number]) on 2/11/2022 10:56:42 AM      MISAEL ALBERTO   This document has been electronically signed. Nov 22 2021  6:34PM

## 2022-02-11 NOTE — CONSULT NOTE ADULT - ASSESSMENT
BIPIN DE LA CRUZ 61 m 2/11/2022 1960 DR KELVIN VANESSA     Initial evaluation/Pulmonary Critical Care consultation requested on  2/11/2022 by Dr Vanessa    from Dr Goins   Patient examined chart reviewed    HOSPITAL ADMISSION   PATIENT CAME  FROM (if information available)      BIPIN DE LA CRUZ 61 m 2/11/2022 1960 DR KELVIN VANESSA     REVIEW OF SYMPTOMS      Able to give (reliable) ROS  NO     PHYSICAL EXAM    HEENT Unremarkable  atraumatic   RESP Fair air entry EXP prolonged    Harsh breath sound Resp distres mild   CARDIAC S1 S2 No S3     NO JVD    ABDOMEN SOFT BS PRESENT NOT DISTENDED No hepatosplenomegaly   PEDAL EDEMA present No calf tenderness  NO rash     PATIENT PRESENTATION.  2/11/2022 ·  61 yr old male with CAD, dilated cardiomyopathy, s/p ICD, atrial flutter on Eliquis, substance abuse was admitted initially from 12/24 for decompensated CHF, was on Milrinone and Bumex drips, left AMA on 12/31 and returned to the ED later in the day as he had to take care of personal matter. He reported shortness of breath and right arm and foot pain on presentation. Cardiology and Heart Failure specialist were consulted, he was placed on oral Bumex., subsequently transitioned to Bumex gtt. Bumex drip stopped on 1/12. Stopped  Milrinone 1/14 transitioned to oral Torsemide , metoprolol . Counseling given re diet and medication compliance .Not candidate for advanced therapies due to smoking/cocaine use history and non-compliance. Patient reported he was recently evicted from his apartment and is homeless, social work consult placed. Seen by PT and recommend  CHAPINCITO. The patient was medically stable for discharge.  Subsequently pt developed COVID-19 infection was admitted to [facility] and discharged a few days ago, now returns to ER because of increasing sob and fevers.  pulm consulted 2/11/2022                                            DOA/CC/PROBLEMS poa .  2/11/2022 A61 m as per EMS, SOB (D/C'D from [facility] 2/1 for covid)  shortness of breath    PMH A fl eliquis cmpthy recent covid dced 2/1       PMH-PSH .  pmh CAD  pmh Dilated cmpthy  pmh AICD  pmh Atrial fluttr on eliquis   pmh substance abuse cocaine   pmh admission CHF  12/24-12/31 left ama Rxed with milrinone and bumex drips left AMA   Readmitted bumex drip till 1/12 miltrinone stoppd 1/14   pmh COVID dced from [facility] 2/1       PROBLEMS.    Resp failure poa 2/11/2022  HFNC 50% on 2/11/2022   COPD pmh    COVID (+) 2/11/2022     Lacticemia poa 2/11/2022 la 2.5   AOC CHF poa 2/11/2022 bnp [id number]  A fib pmh     Hyponatremia poa 2/11/2022   Hyperkalemia poa 2/11/2022   DERRICK poa 2/11/2022       MISC ISSUES.                       COVID  STATUS.   scv2 2/11/2022 (+)         ICU STAY. none  GOC.      BEST PRACTICE ISSUES.                                                  HEAD OF BED ELEVATION. Yes  DVT PROPHYLAXIS.  2/11/2022 apixaba 5.2  a fib     WHITMORE PROPHYLAXIS.     2/11/2022 protonix 40                                                                                    DIET.   soft bite 1.2 l fr     GENERAL ISSUES .                                       ALLERGY. nka pork                            WEIGHT.     2/11/2022 72                               BMI.                 2/11/2022 21         RHYTHM/EKG.  2/11/2022 ekg nsr t wave abd consider lateral ischemia       VITALS/PO/IO/VENT/DRIPS.     2/11/2022 99 78 100/60         ASSESSMENT/RECOMMENDATIONS.    HEMODYNAMICS.   Monitor bp Target MAP 65 (+)      PMH/PSH issues  were addressed.  Management continued/adjusted as indicated         RESP.   Monitor po Target po 90-95%    OXYGEN REQUIREMENTS.  2/11/2022 hfnc 50% po 94%     COVID.  2/11/2022 Dexa 6x 10 d  (Dr Dyson)       RESP FAILURE.  2/11/2022 Pt has been COVID (+) since 1/23 so ID does not think resp failure is sec to covid     COPD.  2/11/2022 pulmicort .5x2         PROSTH/TUBES/LINES.  poa AICD     INFECTION.  W 2/11/2022 w 17  cxr 2/11/2022 cxr nsc 1/14/2022   nsc mod r pl effsn or basal airspace disease   cm  Flu AB 2/11/2022 (-)   RSV 2/11/2022 (-)   abio 2/11/2022 cefepime 2g one dose given already   ID Dr Phan on case   2/11/2022 ID wants to defer abio       CARDIAC.  HEMODYNAMICS.  Lacticemia poa 2/11/2022   la 2/11/2022 la 2.  AOC CHF poa 2/11/2022  ECHO 11/20/2021 ef 20% deidra rve all severely enlargd pasp 37 mod to severe mr   bnp 2/11/2022 bnp [id number]  cxr 2/11/2022 cxr nsc 1/14/2022   nsc mod r pl effsn or basal airspace disease   cm  2/11/2022 bumetanide 2.2   A fib   2/11/2022 apixaba 5.2  2/11/2022 amiodarine    CAD  2/11/2022 ekg nsr t wave abd consider lateral ischemia   2/11/2022 ASA 81   2/11/2022 atorvastat 40  A/R  Rx CHF   Cont amiodarone apixaban   Cardio on case           Hyponatremia   Na 2/11/2022 Na 129   monitor     Hyperkalemia   K 2/11/2022 K 5.7   monitor     DERRICK   Cr 2/11/2022 Cr 1.6   monitor     Elevated LFTS   LFTS 2/11/2022     AST 55  ALT 53   Likely sec to chf      TIME SPENT   Over 55 minutes aggregate care time spent on encounter; activities included   direct patient care, counseling and/or coordinating care reviewing notes, lab data/ imaging , discussion with multidisciplinary team/ patient  /family and explaining in detail risks, benefits, alternatives  of the recommendations     BIPIN DE LA CRUZ 61 m 2/11/2022 1960 DR KELVIN VANESSA

## 2022-02-11 NOTE — CONSULT NOTE ADULT - SUBJECTIVE AND OBJECTIVE BOX
HPI:  61 year old male with a history of CAD, end stage chronic systolic heart failure s/p ICD, atrial flutter s/p DCCV, substance abuse, CKD COVID 19 1/23/22 who presents with worsening shortness of breath and fevers. He has had multiple recent admissions for decompensated and end stage heart failure. He was hospitalized at [facility] for COVID and discharged earlier in the week. On HFNC. Afebrile in ED. WBC 17K. No n/v/d abd pain urinary symptoms.    Infectious Disease consult was called to evaluate pt.    Past Medical & Surgical Hx:  PAST MEDICAL & SURGICAL HISTORY:  Heart failure, systolic  CAD (coronary artery disease)  Hypertension  Nonischemic cardiomyopathy  No significant past surgical history      Social History--  EtOH: denies   Tobacco: denies  Drug Use: denies     FAMILY HISTORY:  FH: lung cancer      Allergies  No Known Allergies    Intolerances  pork (Other)    Home Medications:  acetaminophen 325 mg oral tablet: 2 tab(s) orally every 6 hours, As needed, Temp greater or equal to 38C (100.4F), Mild Pain (1 - 3) (11 Feb 2022 13:52)  Aquaphor Healing topical ointment: Apply topically to affected area once a day (11 Feb 2022 13:52)  Bacid (LAC) oral capsule: 2 cap(s) orally once a day (11 Feb 2022 13:52)  DuoNeb 0.5 mg-2.5 mg/3 mL inhalation solution: 3 milliliter(s) inhaled 4 times a day, As Needed (11 Feb 2022 13:52)  furosemide 20 mg oral tablet: 1 tab(s) orally once a day (11 Feb 2022 13:52)  gabapentin 100 mg oral capsule: 1 cap(s) orally 3 times a day (11 Feb 2022 13:52)  melatonin 3 mg oral tablet: 1 tab(s) orally once a day (at bedtime), As needed, Insomnia (11 Feb 2022 13:52)  metoprolol succinate 100 mg oral tablet, extended release: 1 tab(s) orally once a day (11 Feb 2022 13:52)  simethicone 80 mg oral tablet: 2 tab(s) orally every 6 hours, As Needed (11 Feb 2022 13:52)  Symbicort 160 mcg-4.5 mcg/inh inhalation aerosol: 2 puff(s) inhaled 2 times a day (11 Feb 2022 13:52)  Ventolin HFA 90 mcg/inh inhalation aerosol: 2 puff(s) inhaled 3 times a day for 5 days. Start: 02/10/22 (11 Feb 2022 13:52)    Current Inpatient Medications :    ANTIBIOTICS:       OTHER RELEVANT MEDICATIONS :  acetaminophen     Tablet .. 650 milliGRAM(s) Oral every 6 hours PRN  aluminum hydroxide/magnesium hydroxide/simethicone Suspension 30 milliLiter(s) Oral every 4 hours PRN  apixaban 5 milliGRAM(s) Oral every 12 hours  atorvastatin 40 milliGRAM(s) Oral at bedtime  buMETAnide Injectable 2 milliGRAM(s) IV Push every 12 hours  dexAMETHasone     Tablet 6 milliGRAM(s) Oral daily  melatonin 3 milliGRAM(s) Oral at bedtime PRN  ondansetron Injectable 4 milliGRAM(s) IV Push every 8 hours PRN      ROS:  CONSTITUTIONAL:  Negative fever or chills  EYES:  Negative  blurry vision or double vision  CARDIOVASCULAR:  Negative for chest pain or palpitations  RESPIRATORY:  Negative for cough, wheezing, + SOB   GASTROINTESTINAL:  Negative for nausea, vomiting, diarrhea, constipation, or abdominal pain  GENITOURINARY:  Negative frequency, urgency , dysuria or hematuria   NEUROLOGIC:  No headache, confusion, dizziness, lightheadedness  All other systems were reviewed and are negative      Physical Exam:  Vital Signs Last 24 Hrs  T(C): 37.4 (11 Feb 2022 12:53), Max: 37.5 (11 Feb 2022 09:20)  T(F): 99.3 (11 Feb 2022 12:53), Max: 99.5 (11 Feb 2022 09:20)  HR: 78 (11 Feb 2022 12:53) (78 - 101)  BP: 102/68 (11 Feb 2022 12:53) (102/68 - 118/85)  RR: 20 (11 Feb 2022 12:53) (20 - 20)  SpO2: 94% (11 Feb 2022 12:53) (93% - 100%)  Height (cm): 182.9 (02-11 @ 09:20)  Weight (kg): 72.6 (02-11 @ 09:24)  BMI (kg/m2): 21.7 (02-11 @ 09:24)  BSA (m2): 1.94 (02-11 @ 09:24)    General: On HFNC no acute distress  Neck: supple, trachea midline  Lungs: Decreased no wheeze/rhonchi  Cardiovascular: regular rate and rhythm, S1 S2  Abdomen: soft, nontender, ND, bowel sounds normal  Neurological:  alert and oriented x3  Skin: no rash  Extremities: + edema    Labs:                         14.5   17.56 )-----------( 204      ( 11 Feb 2022 10:10 )             44.0   02-11    129<L>  |  99  |  55<H>  ----------------------------<  164<H>  5.7<H>   |  21<L>  |  1.60<H>    Ca    8.0<L>      11 Feb 2022 10:10    TPro  6.4  /  Alb  2.5<L>  /  TBili  2.7<H>  /  DBili  x   /  AST  55<H>  /  ALT  53  /  AlkPhos  363<H>  02-11      RECENT CULTURES:          RADIOLOGY & ADDITIONAL STUDIES:  ACC: [id number] EXAM:  XR CHEST PORTABLE IMMED 1V                          PROCEDURE DATE:  02/11/2022          INTERPRETATION:  Portable chest radiograph    CLINICAL INFORMATION: Dyspnea, shortness of breath.    TECHNIQUE:  Portable  AP chest radiograph.    COMPARISON: 1/14/2022 chest .    FINDINGS:  CATHETERS AND TUBES: None    PULMONARY: Unchanged moderate RIGHT pleural effusion and/or basilar   airspace disease obscuring diaphragm contour. RIGHT upper zone and LEFT   lung parenchyma clear..  No pneumothorax.    HEART/VASCULAR: The  heart is grossly enlarged in transverse diameter. No   hilar mass.  Cardiac device wire lead is within right ventricle. .    BONES: Visualized osseous structures are intact.    IMPRESSION:   No interval change..      Assessment :   61 year old male with a history of CAD, end stage chronic systolic heart failure s/p ICD, atrial flutter s/p DCCV, substance abuse, CKD COVID 19 1/23/22 admitted with Acute hypoxic respiratory failure likely sec CHF exacerbation Markedly elevated BNP. On HFNC. Afebrile in ED. WBC 17K.   Leukocytosis likely reactive  Doubt resp failure sec COVID 19- pt had been positive since 1/23/22.  Pt is vaccinated against COVID 19.    Plan :   Defer antibiotics  Cont Decadron 6mg po daily short course  No need for Remdesivir  Diurese per cardiology  Fu cultures  Trend temps and cbc  Wean down 02 requirements as tolerated  Trend inflammatory biomarkers    Continue with present regiment .  Approptiate use of antibiotics and adverse effects reviewed.        > 45 minutes spent in direct patient care reviewing  the notes, lab data/ imaging , discussion with multidisciplinary team. All questions were addressed and answered to the best of my capacity .    Thank you for allowing me to participate in the care of your patient .      Kelley Phan MD  Infectious Disease  [phone]

## 2022-02-12 NOTE — PHYSICAL THERAPY INITIAL EVALUATION ADULT - ADDITIONAL COMMENTS
Patient was recently at [facility], prior was homeless, was independent in all ADLS and ambulated independently without device

## 2022-02-12 NOTE — PROGRESS NOTE ADULT - ASSESSMENT
BIPIN DE LA CRUZ 61 m 2/11/2022 1960 DR KELVIN VANESSA     REVIEW OF SYMPTOMS      Able to give (reliable) ROS  NO     PHYSICAL EXAM    HEENT Unremarkable  atraumatic   RESP Fair air entry EXP prolonged    Harsh breath sound Resp distres mild   CARDIAC S1 S2 No S3     NO JVD    ABDOMEN SOFT BS PRESENT NOT DISTENDED No hepatosplenomegaly   PEDAL EDEMA present No calf tenderness  NO rash       DOA/CC/PROBLEMS poa .  2/11/2022 61 m as per EMS, SOB (D/C'D from [facility] 2/1 for covid)  shortness of breath    PROBLEMS.    Resp failure poa 2/11/2022  HFNC 50% on 2/11/2022   r Pl effs 2/11 cxr   COPD pmh    COVID (+) 2/11/2022   AICD  S aureus bacteremia mssa 2/11  Ancef 2/12/2022 Dr Phan    Lacticemia poa 2/11/2022 la 2.5   AOC CHF poa 2/11/2022 bnp 29672  pmh Dilated cmpthy  ECHO 11/20/2021 ef 20%  A fib (on eliquis) pmh   2/11/2022 apixaba 5.2     Hyponatremia poa 2/11/2022 Na 129-133  Hyperkalemia poa 2/11-2/12/2022 K 5.7-5  DERRICK poa 2/11-2/12/2022 Cr 1.6-1.6      MISC ISSUES.                       COVID  STATUS.   scv2 2/11/2022 (+)         ICU STAY. none  GOC.      BEST PRACTICE ISSUES.                                                  HEAD OF BED ELEVATION. Yes  DVT PROPHYLAXIS.  2/11/2022 apixaba 5.2  a fib     WHITMORE PROPHYLAXIS.     2/11/2022 protonix 40                                                                                    DIET.   soft bite 1.2 l fr              VITALS/PO/IO/VENT/DRIPS.  2/12/2022 afeb 90 120/80         ASSESSMENT/RECOMMENDATIONS.    HEMODYNAMICS.   Monitor bp Target MAP 65 (+)      PMH/PSH issues  were addressed.  Management continued/adjusted as indicated         RESP.   Monitor po Target po 90-95%    ABG.   2/11 50% hfnc 746/34/78    OXYGEN REQUIREMENTS.  2/12/2022 50% 30l po 95%    COVID.  Pt has been COVID (+) since 1/23 2/11/2022 Dexa 6x 10 d  (Dr Dyson)       RESP FAILURE.  pt has hypoxemic failure   2/11/2022 Pt has been COVID (+) since 1/23 so ID does not think resp failure is sec to covid     COPD.  2/11/2022 pulmicort .5x2         PROSTH/TUBES/LINES.  poa AICD     INFECTION.  Staph aureus bacteremia 2/11  W 2/11-2/12/2022 w 17-15   pr 2/12/2022 24  cxr 2/11/2022 cxr nsc 1/14/2022   nsc mod r pl effsn or basal airspace disease   cm  Flu AB 2/11/2022 (-)   RSV 2/11/2022 (-)   blod c 2/11 mssa   abio 2/12/2022 ancef 1.3 Dr Garcia sa     CARDIAC.  HEMODYNAMICS.  Lacticemia poa 2/11/2022   la 2/11/2022 la 2.  AOC CHF poa 2/11/2022  ECHO 11/20/2021 ef 20% deidra rve all severely enlargd pasp 37 mod to severe mr   bnp 2/11-2/12/2022 bnp 47954 - 68158   cxr 2/11/2022 cxr nsc 1/14/2022   nsc mod r pl effsn or basal airspace disease   cm  2/11/2022 bumetanide 2.2 iv   A fib   2/11/2022 apixaba 5.2  2/11/2022 amiodarine  200   2/12/2022 metoprolol 25   CAD  2/11/2022 ekg nsr t wave abd consider lateral ischemia   2/11/2022 ASA 81   2/11/2022 atorvastat 40  A/R  Rx CHF   Cont amiodarone apixaban   Cardio on case           Hyponatremia   Na 2/11-2/12/2022 Na 129 - 133  monitor     Hyperkalemia   K 2/11-2/12/2022 K 5.7 - 5   monitor     DERRICK   Cr 2/11-2/12/2022 Cr 1.6 - 1.6  monitor     Elevated LFTS   LFTS 2/11-2/12/2022   -293  AST 55-34  ALT 53 -42  Likely sec to chf      TIME SPENT   Over 25 minutes aggregate care time spent on encounter; activities included   direct patient care, counseling and/or coordinating care reviewing notes, lab data/ imaging , discussion with multidisciplinary team/ patient  /family and explaining in detail risks, benefits, alternatives  of the recommendations     BIPIN Narvaez m 2/11/2022 1960 DR KELVIN VANESSA

## 2022-02-12 NOTE — PHYSICAL THERAPY INITIAL EVALUATION ADULT - PERTINENT HX OF CURRENT PROBLEM, REHAB EVAL
61 yr old male ,known to me from [facility]  with med hx of  CAD, dilated cardiomyopathy, s/p ICD, atrial flutter on Eliquis, substance abuse was admitted initially from 12/24 for decompensated CHF, was on Milrinone and Bumex drips, left AMA on 12/31 and returned to the ED later in the day as he had to take care of personal matter.

## 2022-02-12 NOTE — PROGRESS NOTE ADULT - ASSESSMENT
The patient is a 61 year old male with a history of CAD, chronic systolic heart failure s/p ICD, atrial flutter s/p DCCV, substance abuse, CKD who is admitted with acute on chronic systolic heart failure, COVID.    Plan:  - ECG with IVCD and LVH related abnormality  - [place] elevated at [address] - was down [place] 48041 on discharge last month  - Echo 11/21 with severely reduced LV and RV function, mod/severe MR, mod TR  - CXR with bilateral pleural effusions  - Has required inotropes during previous admissions  - Continue bumetanide 2 mg IV q12h  - Hold metoprolol for now  - Hold spironolactone due to hyperkalemia  - Continue amiodarone 200 mg daily  - Continue apixaban 5 mg bid  - Continue aspirin 81 mg daily  - Continue atorvastatin 40 mg daily  - COVID-19 positive  - On dexamethasone  - Staph bacteremia - on vancomycin. ID follow-up.  - Due to substance abuse and poor compliance, the patient was deemed by the heart failure team at [facility] to not be a candidate for advanced heart failure therapies. Given that the patient is end stage heart failure with multiple recent admissions for decompensated heart failure, palliative care options are likely the most suitable for the patient. Recommend palliative care evaluation. The patient is a 61 year old male with a history of CAD, chronic systolic heart failure s/p ICD, atrial flutter s/p DCCV, substance abuse, CKD who is admitted with acute on chronic systolic heart failure, COVID.    Plan:  - ECG with IVCD and LVH related abnormality  - BNP significantly elevated at 34961 - was down to 54678 on discharge last month  - Echo 11/21 with severely reduced LV and RV function, mod/severe MR, mod TR  - CXR with bilateral pleural effusions  - Has required inotropes during previous admissions  - Continue bumetanide 2 mg IV q12h  - Will resume metoprolol succinate at 25 mg daily for now  - Hold spironolactone due to hyperkalemia  - Continue amiodarone 200 mg daily  - Continue apixaban 5 mg bid  - Continue aspirin 81 mg daily  - Continue atorvastatin 40 mg daily  - COVID-19 positive  - On dexamethasone  - Staph bacteremia - on vancomycin. ID follow-up.  - Due to substance abuse and poor compliance, the patient was deemed by the heart failure team at [facility] to not be a candidate for advanced heart failure therapies. Given that the patient is end stage heart failure with multiple recent admissions for decompensated heart failure, palliative care options are likely the most suitable for the patient. Recommend palliative care evaluation.

## 2022-02-12 NOTE — DIETITIAN INITIAL EVALUATION ADULT. - PERTINENT MEDS FT
eliquis, amiodarone, atorvastatin, decadron, neurontin, lactobacillus, melatonin, pantoprazole, simethicone

## 2022-02-12 NOTE — PATIENT PROFILE ADULT - FALL HARM RISK - UNIVERSAL INTERVENTIONS
Bed in lowest position, wheels locked, appropriate side rails in place/Call bell, personal items and telephone in reach/Instruct patient to call for assistance before getting out of bed or chair/Non-slip footwear when patient is out of bed/[place] to call system/Physically safe environment - no spills, clutter or unnecessary equipment/Purposeful Proactive Rounding/Room/bathroom lighting operational, light cord in reach

## 2022-02-12 NOTE — PROGRESS NOTE ADULT - ASSESSMENT
61 yr old male ,known to me from [facility]  with med hx of  CAD, dilated cardiomyopathy, s/p ICD, atrial flutter on Eliquis, substance abuse was admitted initially from 12/24 for decompensated CHF, was on Milrinone and Bumex drips, left AMA on 12/31 and returned to the ED later in the day as he had to take care of personal matter. He reported shortness of breath and right arm and foot pain on presentation. Cardiology and Heart Failure specialist were consulted, he was placed on oral Bumex., subsequently transitioned to Bumex gtt. Bumex drip stopped on 1/12. Stopped  Milrinone 1/14 transitioned to oral Torsemide , metoprolol . Counseling given re diet and medication compliance .Not candidate for advanced therapies due to smoking/cocaine use history and non-compliance. Patient reported he was recently evicted from his apartment and is homeless, social work consult placed. Seen by PT and recommend  [facility]. The patient was medically stable for discharge.  Subsequently pt developed COVID-19 infection was admitted to [facility] and discharged a few days ago, now returns to ER from rehab  because of increasing sob and fevers .Found to have positive COVID test ,seen by ID & pulm consult requested Palliative care consult requested ,to discuss advance directives and complete MOLST  (11 Feb 2022 13:41)      ACUTE RENAL FAILURE:   Serum creatinine is  at 1.6    , approximating GFR decreased .   There is no progression . No uremic symptoms  No evidence of anemia .  Fluid status stable.  Will continue to avoid nephrotoxic drugs.  Patient remains asymptomatic.   Continue current therapy.  hold  diuretic.  hold   ACE inhibitor.  hold   ARB.  Additional evaluation:   ECG,    echocardiogram,     CXR,  will obtained recent   renal ultrasound to evalaute kidney size and possible stones ,    BP monitoring,continue current antihypertensive meds, low salt diet,followup with PMD in 1-2 weeks

## 2022-02-12 NOTE — PROVIDER CONTACT NOTE (CRITICAL VALUE NOTIFICATION) - TEST AND RESULT REPORTED:
Blood culture from 2/11/22 growth in aerobic bottle gram positive cocci in clusters
lactic acid 2.5
blood culture positive  : gram positive cocci in clusters

## 2022-02-12 NOTE — DIETITIAN INITIAL EVALUATION ADULT. - OTHER INFO
The patient is a 61 year old male with a history of CAD, chronic systolic heart failure s/p ICD, atrial flutter s/p DCCV, substance abuse, CKD who is admitted with acute on chronic systolic heart failure, COVID. The patient is a 61 year old male with a history of CAD, chronic systolic heart failure s/p ICD, atrial flutter s/p DCCV, substance abuse, CKD who is admitted with acute on chronic systolic heart failure, COVID.  In view of ht/wt , covid dx  and no edema at this time FR appears to severe, would liberalize. would change diet to cho consistent JHONNY soft bite sized and d/c FR at this time. Would supplement diet with Glucerna 8 oz po BID until po intake is markedly improved. Pt is not very learner ready for diet education at this time. He has ^ nutrient needs due to the above pressure injuries. Would rx with mvi with minerals 1 tab daily, vit C 500 mg daily and Zns04 220 mg po for 10 days

## 2022-02-12 NOTE — PHYSICAL THERAPY INITIAL EVALUATION ADULT - GAIT TRAINING, PT EVAL
Patient will ambulate x 100 feet with appropriate device in 2 weeks in order to increase mobility at home

## 2022-02-12 NOTE — PROGRESS NOTE ADULT - ASSESSMENT
61 yr old male with CAD, dilated cardiomyopathy, s/p ICD, atrial flutter on Eliquis, substance abuse was admitted initially from 12/24 for decompensated CHF, was on Milrinone and Bumex drips, left AMA on 12/31 and returned to the ED later in the day as he had to take care of personal matter. He reported shortness of breath and right arm and foot pain on presentation. Cardiology and Heart Failure specialist were consulted, he was placed on oral Bumex., subsequently transitioned to Bumex gtt. Bumex drip stopped on 1/12. Stopped  Milrinone 1/14 transitioned to oral Torsemide , metoprolol . Counseling given re diet and medication compliance .Not candidate for advanced therapies due to smoking/cocaine use history and non-compliance. Patient reported he was recently evicted from his apartment and is homeless, social work consult placed. Seen by PT and recommend  [facility]. The patient was medically stable for discharge.  Subsequently pt developed COVID-19 infection was admitted to [facility] and discharged a few days ago, now returns to ER because of increasing sob and fevers.    remains COVID positive  Hypoxemia - resp distress  CKD -   Cardiomyopathy - Systolic CHF  Non - Domicile  AF hx  Substance Use Disorder    on HF  on ABX  Blood cx noted  VS noted  LABS reviewed    old records reviewed  tox screen reviewed  pt is homeless at present - no immediate family  uses [facility] - Smoker - Opioids   remains Covid Positive  advanced Heart disease and end stage CHF  recent hospitalization at [facility]  fio2 support  counseling  emotional support  SW involvement  SBIRT  assist with needs  prognosis guarded

## 2022-02-12 NOTE — DIETITIAN INITIAL EVALUATION ADULT. - PERTINENT LABORATORY DATA
( 2/12/22)  133  99  116                   5.0  28   58/1.6( H)   alb 2.3( L) ca ( 8.4) 9.76   ( 11/ 20/21) A1c 6.3 c/w pre DM

## 2022-02-12 NOTE — PHYSICAL THERAPY INITIAL EVALUATION ADULT - TRANSFER TRAINING, PT EVAL
Patient will transfer independently from all surfaces in 2 weeks in order to increase mobility at home

## 2022-02-12 NOTE — CHART NOTE - NSCHARTNOTEFT_GEN_A_CORE
Called by RN for overnight telemetry monitor event. Around 12am patient had 28 beats of vtach on telemonitor. Patient asymptomatic at this time and resting comfortably in bed. Patient has ICD in place. Patient in rate controlled afib on monitor at this time. Will add mag and phos to morning labs    Vital Signs Last 24 Hrs  T(C): 37.1 (12 Feb 2022 06:28), Max: 37.6 (11 Feb 2022 21:23)  T(F): 98.7 (12 Feb 2022 06:28), Max: 99.6 (11 Feb 2022 21:23)  HR: 90 (12 Feb 2022 06:28) (73 - 101)  BP: 146/81 (12 Feb 2022 06:28) (102/68 - 146/81)  BP(mean): --  RR: 19 (12 Feb 2022 06:28) (17 - 20)  SpO2: 99% (12 Feb 2022 06:28) (93% - 100%)

## 2022-02-12 NOTE — PHYSICAL THERAPY INITIAL EVALUATION ADULT - GENERAL OBSERVATIONS, REHAB EVAL
Patient received supine in bed, cooperative with physical therapy, + O2 via high flow, + heart monitor

## 2022-02-12 NOTE — PROGRESS NOTE ADULT - ASSESSMENT
61 yr old male with CAD, dilated cardiomyopathy, s/p ICD, atrial flutter on Eliquis, substance abuse was admitted initially from 12/24 for decompensated CHF, was on Milrinone and Bumex drips, left AMA on 12/31 and returned to the ED later in the day as he had to take care of personal matter. He reported shortness of breath and right arm and foot pain on presentation. Cardiology and Heart Failure specialist were consulted, he was placed on oral Bumex., subsequently transitioned to Bumex gtt. Bumex drip stopped on 1/12. Stopped  Milrinone 1/14 transitioned to oral Torsemide , metoprolol . Counseling given re diet and medication compliance .Not candidate for advanced therapies due to smoking/cocaine use history and non-compliance. Patient reported he was recently evicted from his apartment and is homeless, social work consult placed. Seen by PT and recommend  [facility]. The patient was medically stable for discharge.  Subsequently pt developed COVID-19 infection was admitted to [facility] and discharged a few days ago, now returns to ER from rehab  because of increasing sob and fevers .Found to have positive COVID test ,seen by ID & pulm consult requested Palliative care consult requested ,to discuss advance directives and complete MOLST

## 2022-02-13 NOTE — PROGRESS NOTE ADULT - ASSESSMENT
61 yr old male ,known to me from [facility]  with med hx of  CAD, dilated cardiomyopathy, s/p ICD, atrial flutter on Eliquis, substance abuse was admitted initially from 12/24 for decompensated CHF, was on Milrinone and Bumex drips, left AMA on 12/31 and returned to the ED later in the day as he had to take care of personal matter. He reported shortness of breath and right arm and foot pain on presentation. Cardiology and Heart Failure specialist were consulted, he was placed on oral Bumex., subsequently transitioned to Bumex gtt. Bumex drip stopped on 1/12. Stopped  Milrinone 1/14 transitioned to oral Torsemide , metoprolol . Counseling given re diet and medication compliance .Not candidate for advanced therapies due to smoking/cocaine use history and non-compliance. Patient reported he was recently evicted from his apartment and is homeless, social work consult placed. Seen by PT and recommend  [facility]. The patient was medically stable for discharge.  Subsequently pt developed COVID-19 infection was admitted to [facility] and discharged a few days ago, now returns to ER from rehab  because of increasing sob and fevers .Found to have positive COVID test ,seen by ID & pulm consult requested Palliative care consult requested ,to discuss advance directives and complete MOL  (11 Feb 2022 13:41)      ACUTE RENAL FAILURE:   Serum creatinine is  at 1.6    , approximating GFR decreased .   There is no progression . No uremic symptoms  No evidence of anemia .  Fluid status stable.  Will continue to avoid nephrotoxic drugs.  Patient remains asymptomatic.   Continue current therapy.    BP monitoring,continue current antihypertensive meds, low salt diet,followup with PMD in 1-2 weeks  metoprolol succinate ER 25 milliGRAM(s) Oral daily    Admit for septic workup and ID evaluation,send blood and urine cx,serial lactate levels,monitor vitals closley,ivfs hydration,monitor urine output and renal profile,iv abx as per id cons  ceFAZolin   IVPB 2000 milliGRAM(s) IV Intermittent every 8 hours

## 2022-02-13 NOTE — PROGRESS NOTE ADULT - ASSESSMENT
61 yr old male with CAD, dilated cardiomyopathy, s/p ICD, atrial flutter on Eliquis, substance abuse was admitted initially from 12/24 for decompensated CHF, was on Milrinone and Bumex drips, left AMA on 12/31 and returned to the ED later in the day as he had to take care of personal matter. He reported shortness of breath and right arm and foot pain on presentation. Cardiology and Heart Failure specialist were consulted, he was placed on oral Bumex., subsequently transitioned to Bumex gtt. Bumex drip stopped on 1/12. Stopped  Milrinone 1/14 transitioned to oral Torsemide , metoprolol . Counseling given re diet and medication compliance .Not candidate for advanced therapies due to smoking/cocaine use history and non-compliance. Patient reported he was recently evicted from his apartment and is homeless, social work consult placed. Seen by PT and recommend  [facility]. The patient was medically stable for discharge.  Subsequently pt developed COVID-19 infection was admitted to [facility] and discharged a few days ago, now returns to ER because of increasing sob and fevers.    remains COVID positive  Hypoxemia - resp distress  CKD -   Cardiomyopathy - Systolic CHF  Non - Domicile  AF hx  Substance Use Disorder    on HF  on ABX  Blood cx noted  VS noted  LABS reviewed    old records reviewed  tox screen reviewed  pt is homeless at present - no immediate family  uses [facility] - Smoker - Opioids   remains Covid Positive  advanced Heart disease and end stage CHF  recent hospitalization at [facility]  fio2 support  counseling  emotional support  SW involvement  SBIRT  assist with needs  prognosis guarded

## 2022-02-13 NOTE — PROGRESS NOTE ADULT - ASSESSMENT
BIPIN DE LA CRUZ 61 m 2/11/2022 1960 DR KELVIN VANESSA     REVIEW OF SYMPTOMS      Able to give (reliable) ROS  NO     PHYSICAL EXAM    HEENT Unremarkable  atraumatic   RESP Fair air entry EXP prolonged    Harsh breath sound Resp distres mild   CARDIAC S1 S2 No S3     NO JVD    ABDOMEN SOFT BS PRESENT NOT DISTENDED No hepatosplenomegaly   PEDAL EDEMA present No calf tenderness  NO rash       DOA/CC/PROBLEMS poa .  2/11/2022 61 m as per EMS, SOB (D/C'D from [facility] 2/1 for covid)  shortness of breath    PROBLEMS.    Resp failure poa 2/11/2022  HFNC 50% on 2/11/2022   r Pl effs 2/11 cxr   COPD pmh    COVID (+) 2/11/2022   AICD  S aureus bacteremia mssa 2/11  Ancef 2/12/2022 Dr Phan    Lacticemia poa 2/11/2022 la 2.5   AOC CHF poa 2/11/2022 bnp 65644  pmh Dilated cmpthy  ECHO 11/20/2021 ef 20%  A fib (on eliquis) pmh   2/11/2022 apixaba 5.2       Hyponatremia poa 2/11/2022 Na 129-133  Hyperkalemia poa 2/11-2/12/2022 K 5.7-5  DERRICK poa 2/11-2/12/2022 Cr 1.6-1.6      MISC ISSUES.                       COVID  STATUS.   scv2 2/11/2022 (+)         ICU STAY. none  GOC.  2/13/2022 full code     BEST PRACTICE ISSUES.                                                  HEAD OF BED ELEVATION. Yes  DVT PROPHYLAXIS.  2/11/2022 apixaba 5.2  a fib     WHITMORE PROPHYLAXIS.     2/11/2022 protonix 40                                                                                    DIET.   soft bite 1.2 l fr              VITALS/PO/IO/VENT/DRIPS.  2/13/2022 afeb 71 120/90      ASSESSMENT/RECOMMENDATIONS.    HEMODYNAMICS.   Monitor bp Target MAP 65 (+)      PMH/PSH issues  were addressed.  Management continued/adjusted as indicated         RESP.   Monitor po Target po 90-95%    ABG.   2/11 50% hfnc 746/34/78    OXYGEN REQUIREMENTS.  2/13/2022 50% 30l po 96%    COVID.  Pt has been COVID (+) since 1/23 2/11/2022 Dexa 6x 10 d  (Dr Dyson)       RESP FAILURE.  pt has hypoxemic failure   2/11/2022 Pt has been COVID (+) since 1/23 so ID does not think resp failure is sec to covid     COPD.  2/11/2022 pulmicort .5x2       PROSTH/TUBES/LINES.  poa AICD     INFECTION.  Staph aureus bacteremia 2/11  W 2/11-2/12-2/13/2022 w 17-15 - 13   pr 2/12/2022 24  cxr 2/11/2022 cxr nsc 1/14/2022   nsc mod r pl effsn or basal airspace disease   cm  Flu AB 2/11/2022 (-)   RSV 2/11/2022 (-)  blod c 2/12 staph a   blod c 2/11 mssa   abio 2/12/2022 ancef 1.3 Dr Jose hernandez     CARDIAC.  HEMODYNAMICS.  Lacticemia poa 2/11/2022   la 2/11/2022 la 2.  AOC CHF poa 2/11/2022  ECHO 11/20/2021 ef 20% deidra rve all severely enlargd pasp 37 mod to severe mr   bnp 2/11-2/12/2022 bnp 58645 - 98392   cxr 2/11/2022 cxr nsc 1/14/2022   nsc mod r pl effsn or basal airspace disease   cm  2/11/2022 bumetanide 2.2 iv   A fib   2/11/2022 apixaba 5.2  2/11/2022 amiodarine  200   2/12/2022 metoprolol 25   CAD  2/11/2022 ekg nsr t wave abd consider lateral ischemia   2/11/2022 ASA 81   2/11/2022 atorvastat 40  A/R  Rx CHF   Cont amiodarone apixaban   Cardio on case             Hyponatremia   Na 2/11-2/12-2/13/2022 Na 129 - 133- 134   monitor     Hyperkalemia   K 2/11-2/12/2022 K 5.7 - 5   monitor     DERRICK   Cr 2/11-2/12-2/13/2022 Cr 1.6 - 1.6- 1.5  monitor     Elevated LFTS   LFTS 2/11-2/12/2022   -293  AST 55-34  ALT 53 -42  Likely sec to chf      TIME SPENT   Over 25 minutes aggregate care time spent on encounter; activities included   direct patient care, counseling and/or coordinating care reviewing notes, lab data/ imaging , discussion with multidisciplinary team/ patient  /family and explaining in detail risks, benefits, alternatives  of the recommendations     BIPIN DE LA CRUZ 61 m 2/11/2022 1960 DR KELVIN VANESSA

## 2022-02-13 NOTE — PROGRESS NOTE ADULT - ASSESSMENT
61 yr old male with CAD, dilated cardiomyopathy, s/p ICD, atrial flutter on Eliquis, substance abuse was admitted initially from 12/24 for decompensated CHF, was on Milrinone and Bumex drips, left AMA on 12/31 and returned to the ED later in the day as he had to take care of personal matter. He reported shortness of breath and right arm and foot pain on presentation. Cardiology and Heart Failure specialist were consulted, he was placed on oral Bumex., subsequently transitioned to Bumex gtt. Bumex drip stopped on 1/12. Stopped  Milrinone 1/14 transitioned to oral Torsemide , metoprolol . Counseling given re diet and medication compliance .Not candidate for advanced therapies due to smoking/cocaine use history and non-compliance. Patient reported he was recently evicted from his apartment and is homeless, social work consult placed. Seen by PT and recommend  [facility]. The patient was medically stable for discharge.  Subsequently pt developed COVID-19 infection was admitted to [facility] and discharged a few days ago, now returns to ER from rehab  because of increasing sob and fevers .Found to have positive COVID test ,seen by ID & pulm consult requested Palliative care consult requested ,to discuss advance directives and complete MOLST

## 2022-02-13 NOTE — PROGRESS NOTE ADULT - ASSESSMENT
The patient is a 61 year old male with a history of CAD, chronic systolic heart failure s/p ICD, atrial flutter s/p DCCV, substance abuse, CKD who is admitted with acute on chronic systolic heart failure, COVID.    Plan:  - ECG with IVCD and LVH related abnormality  - B[place] significantly elevated at 264[address] - was down [place] 61849 on discharge last month  - Echo 11/21 with severely reduced LV and RV function, mod/severe MR, mod TR  - CXR with bilateral pleural effusions  - Has required inotropes during previous admissions  - Continue bumetanide 2 mg IV q12h  - Continue metoprolol succinate 25 mg daily  - Hold spironolactone due to hyperkalemia  - Continue amiodarone 200 mg daily  - Continue apixaban 5 mg bid  - Continue aspirin 81 mg daily  - Continue atorvastatin 40 mg daily  - COVID-19 positive  - On dexamethasone  - Staph bacteremia - on vancomycin. ID follow-up. Echo pending. If any additional imaging needed (i.e. VANESSA), recommend transfer back to [facility].  - Due to substance abuse and poor compliance, the patient was deemed by the heart failure team at [facility] to not be a candidate for advanced heart failure therapies. Given that the patient is end stage heart failure with multiple recent admissions for decompensated heart failure, palliative care options are likely the most suitable for the patient. Recommend palliative care evaluation.

## 2022-02-14 NOTE — PROGRESS NOTE ADULT - ASSESSMENT
BIPIN DE LA CRUZ 61 m 2/11/2022 1960 DR KELVIN VANESSA     REVIEW OF SYMPTOMS      Able to give (reliable) ROS  NO     PHYSICAL EXAM    HEENT Unremarkable  atraumatic   RESP Fair air entry EXP prolonged    Harsh breath sound Resp distres mild   CARDIAC S1 S2 No S3     NO JVD    ABDOMEN SOFT BS PRESENT NOT DISTENDED No hepatosplenomegaly   PEDAL EDEMA present No calf tenderness  NO rash       DOA/CC/PROBLEMS poa .  2/11/2022 61 m as per EMS, SOB (D/C'D from [facility] 2/1 for covid)  shortness of breath    PROBLEMS.    Resp failure poa 2/11/2022  HFNC 50% on 2/11/2022   r Pl effs 2/11 cxr   COPD pmh    COVID (+) 2/11/2022   AICD  S aureus bacteremia mssa 2/11  Ancef 2/12/2022 Dr Phan    Lacticemia poa 2/11/2022 la 2.5   AOC CHF poa 2/11/2022 bnp 74963  pmh Dilated cmpthy  ECHO 11/20/2021 ef 20%  A fib (on eliquis) pmh   2/11/2022 apixaba 5.2       Hyponatremia poa 2/11/2022 Na 129-133  Hyperkalemia poa 2/11-2/12/2022 K 5.7-5  DERRICK poa 2/11-2/12/2022 Cr 1.6-1.6    MISC ISSUES.                       COVID  STATUS.   scv2 2/11/2022 (+)         ICU STAY. none  GOC.  2/13/2022 full code     BEST PRACTICE ISSUES.                                                  HEAD OF BED ELEVATION. Yes  DVT PROPHYLAXIS.  2/11/2022 apixaba 5.2  a fib     WHITMORE PROPHYLAXIS.     2/11/2022 protonix 40                                                                                    DIET.   soft bite 1.2 l fr              VITALS/PO/IO/VENT/DRIPS.  2/14/2022 afeb 68 110/70      ASSESSMENT/RECOMMENDATIONS.    HEMODYNAMICS.   Monitor bp Target MAP 65 (+)      PMH/PSH issues  were addressed.  Management continued/adjusted as indicated         RESP.   Monitor po Target po 90-95%    ABG.   2/11 50% hfnc 746/34/78    OXYGEN REQUIREMENTS.  2/14/2022 50% 30l po 99%    COVID.  Pt has been COVID (+) since 1/23 2/11/2022 Dexa 6x 10 d  (Dr Dyson)       RESP FAILURE.  pt has hypoxemic failure   2/11/2022 Pt has been COVID (+) since 1/23 so ID does not think resp failure is sec to covid     COPD.  2/11/2022 pulmicort .5x2 DCed 2/14/2022      PROSTH/TUBES/LINES.  poa AICD     INFECTION.  Staph aureus bacteremia 2/11  W 2/11-2/12-2/13/2022 w 17-15 - 13   pr 2/12/2022 24  ECHO 2/13/2022 no endocarditis  cxr 2/11/2022 cxr nsc 1/14/2022   nsc mod r pl effsn or basal airspace disease   cm  Flu AB 2/11/2022 (-)   RSV 2/11/2022 (-)  blod c 2/12 staph a   blod c 2/11 mssa   abio 2/12/2022 ancef 1.3 Dr Garcia sa     CARDIAC.  HEMODYNAMICS.  Lacticemia poa 2/11/2022   la 2/11/2022 la 2.  AOC CHF poa 2/11/2022  ECHO 2/13/2022 no endocarditis severe lvsd mod mr mild ph pa 42   ECHO 11/20/2021 ef 20% deidra rve all severely enlargd pasp 37 mod to severe mr   bnp 2/11-2/12/2022 bnp 50048 - 01602   cxr 2/11/2022 cxr nsc 1/14/2022   nsc mod r pl effsn or basal airspace disease   cm  2/11/2022 bumetanide 2.2 iv   2/14/2022 sacubitril 24 valsartan 25 bid   2/14/2022 spironolact 25   A fib   2/11/2022 apixaba 5.2  2/11/2022 amiodarine  200   2/12/2022 metoprolol 25   CAD  2/11/2022 ekg nsr t wave abd consider lateral ischemia   2/11/2022 ASA 81   2/11/2022 atorvastat 40  A/R  Rx CHF   monitor k   Cont amiodarone apixaban   Cardio on case             Hyponatremia   Na 2/11-2/12-2/13-2/14/2022 Na 129 - 133- 134 -135  monitor     Hyperkalemia   K 2/11-2/12-2/14/2022 K 5.7 - 5 - 4.8  monitor     DERRICK   Cr 2/11-2/12-2/13-2/14/2022 Cr 1.6 - 1.6- 1.5-1.4  monitor     Elevated LFTS   LFTS 2/11-2/12/2022   -293  AST 55-34  ALT 53 -42  Likely sec to chf      TIME SPENT   Over 25 minutes aggregate care time spent on encounter; activities included   direct patient care, counseling and/or coordinating care reviewing notes, lab data/ imaging , discussion with multidisciplinary team/ patient  /family and explaining in detail risks, benefits, alternatives  of the recommendations     BIPIN DE LA CRUZ 61 m 2/11/2022 1960 DR KELVIN VANESSA

## 2022-02-14 NOTE — PROGRESS NOTE ADULT - ASSESSMENT
61 yr old male ,known to me from [facility]  with med hx of  CAD, dilated cardiomyopathy, s/p ICD, atrial flutter on Eliquis, substance abuse was admitted initially from 12/24 for decompensated CHF, was on Milrinone and Bumex drips, left AMA on 12/31 and returned to the ED later in the day as he had to take care of personal matter. He reported shortness of breath and right arm and foot pain on presentation. Cardiology and Heart Failure specialist were consulted, he was placed on oral Bumex., subsequently transitioned to Bumex gtt. Bumex drip stopped on 1/12. Stopped  Milrinone 1/14 transitioned to oral Torsemide , metoprolol . Counseling given re diet and medication compliance .Not candidate for advanced therapies due to smoking/cocaine use history and non-compliance. Patient reported he was recently evicted from his apartment and is homeless, social work consult placed. Seen by PT and recommend  [facility]. The patient was medically stable for discharge.  Subsequently pt developed COVID-19 infection was admitted to [facility] and discharged a few days ago, now returns to ER from rehab  because of increasing sob and fevers .Found to have positive COVID test ,seen by ID & pulm consult requested Palliative care consult requested ,to discuss advance directives and complete MOL  (11 Feb 2022 13:41)      ACUTE RENAL FAILURE:   Serum creatinine is  at 1.6    , approximating GFR decreased .   There is no progression . No uremic symptoms  No evidence of anemia .  Fluid status stable.  Will continue to avoid nephrotoxic drugs.  Patient remains asymptomatic.   Continue current therapy.    BP monitoring,continue current antihypertensive meds, low salt diet,followup with PMD in 1-2 weeks  metoprolol succinate ER 25 milliGRAM(s) Oral daily    Admit for septic workup and ID evaluation,send blood and urine cx,serial lactate levels,monitor vitals closley,ivfs hydration,monitor urine output and renal profile,iv abx as per id cons  ceFAZolin   IVPB 2000 milliGRAM(s) IV Intermittent every 8 hours

## 2022-02-14 NOTE — PROGRESS NOTE ADULT - ASSESSMENT
The patient is a 61 year old male with a history of CAD, chronic systolic heart failure s/p ICD, atrial flutter s/p DCCV, substance abuse, CKD who is admitted with acute on chronic systolic heart failure, COVID.    Plan:  - ECG with IVCD and LVH related abnormality  - [place] significantly elevated at [address] - was down to 33353 on discharge last month  - Echo 11/21 with severely reduced LV and RV function, mod/severe MR, mod TR  - CXR with bilateral pleural effusions  - Has required inotropes during previous admissions  - Continue bumetanide 2 mg IV q12h  - Continue metoprolol succinate 25 mg daily  - Resume spironolactone 25 mg daily  - Add Entresto 24-26 mg bid  - Monitor potassium  - Continue amiodarone 200 mg daily  - Continue apixaban 5 mg bid  - Continue aspirin 81 mg daily  - Continue atorvastatin 40 mg daily  - COVID-19 positive  - On dexamethasone  - Staph bacteremia - on cefazolin. ID follow-up. Echo pending. If any additional imaging needed (i.e. VANESSA), recommend transfer back to [facility].  - Due to substance abuse and poor compliance, the patient was deemed by the heart failure team at [facility] to not be a candidate for advanced heart failure therapies. Given that the patient is end stage heart failure with multiple recent admissions for decompensated heart failure, palliative care options are likely the most suitable for the patient. Recommend palliative care evaluation.

## 2022-02-14 NOTE — PROGRESS NOTE ADULT - ASSESSMENT
61 yr old male with CAD, dilated cardiomyopathy, s/p ICD, atrial flutter on Eliquis, substance abuse was admitted initially from 12/24 for decompensated CHF, was on Milrinone and Bumex drips, left AMA on 12/31 and returned to the ED later in the day as he had to take care of personal matter. He reported shortness of breath and right arm and foot pain on presentation. Cardiology and Heart Failure specialist were consulted, he was placed on oral Bumex., subsequently transitioned to Bumex gtt. Bumex drip stopped on 1/12. Stopped  Milrinone 1/14 transitioned to oral Torsemide , metoprolol . Counseling given re diet and medication compliance .Not candidate for advanced therapies due to smoking/cocaine use history and non-compliance. Patient reported he was recently evicted from his apartment and is homeless, social work consult placed. Seen by PT and recommend  [facility]. The patient was medically stable for discharge.  Subsequently pt developed COVID-19 infection was admitted to [facility] and discharged a few days ago, now returns to ER because of increasing sob and fevers.    remains COVID positive  Hypoxemia - resp distress  CKD -   Cardiomyopathy - Systolic CHF  Non - Domicile  AF hx  Substance Use Disorder    on HF  on ABX - Ancef  Blood cx noted  VS noted  LABS reviewed    old records reviewed  tox screen reviewed  pt is homeless at present - no immediate family  uses [facility] - [facility] - Smoker - Opioids   remains Covid Positive  advanced Heart disease and end stage CHF  recent hospitalization at [facility]  fio2 support  counseling  emotional support  SW involvement  SBIRT  assist with needs  prognosis guarded

## 2022-02-14 NOTE — PROGRESS NOTE ADULT - ASSESSMENT
61 yr old male with CAD, dilated cardiomyopathy, s/p ICD, atrial flutter on Eliquis, substance abuse was admitted initially from 12/24 for decompensated CHF, was on Milrinone and Bumex drips, left AMA on 12/31 and returned to the ED later in the day as he had to take care of personal matter. He reported shortness of breath and right arm and foot pain on presentation. Cardiology and Heart Failure specialist were consulted, he was placed on oral Bumex., subsequently transitioned to Bumex gtt. Bumex drip stopped on 1/12. Stopped  Milrinone 1/14 transitioned to oral Torsemide , metoprolol . Counseling given re diet and medication compliance .Not candidate for advanced therapies due to smoking/cocaine use history and non-compliance. Patient reported he was recently evicted from his apartment and is homeless, social work consult placed. Seen by PT and recommend  [facility]. The patient was medically stable for discharge.  Subsequently pt developed COVID-19 infection was admitted to [facility] and discharged a few days ago, now returns to ER from rehab  because of increasing sob and fevers .Found to have positive COVID test ,seen by ID & pulm consult requested Palliative care consult requested ,to discuss advance directives and complete MOLST

## 2022-02-15 NOTE — PROGRESS NOTE ADULT - ASSESSMENT
61 yr old male with CAD, dilated cardiomyopathy, s/p ICD, atrial flutter on Eliquis, substance abuse was admitted initially from 12/24 for decompensated CHF, was on Milrinone and Bumex drips, left AMA on 12/31 and returned to the ED later in the day as he had to take care of personal matter. He reported shortness of breath and right arm and foot pain on presentation. Cardiology and Heart Failure specialist were consulted, he was placed on oral Bumex., subsequently transitioned to Bumex gtt. Bumex drip stopped on 1/12. Stopped  Milrinone 1/14 transitioned to oral Torsemide , metoprolol . Counseling given re diet and medication compliance .Not candidate for advanced therapies due to smoking/cocaine use history and non-compliance. Patient reported he was recently evicted from his apartment and is homeless, social work consult placed. Seen by PT and recommend  [facility]. The patient was medically stable for discharge.  Subsequently pt developed COVID-19 infection was admitted to [facility] and discharged a few days ago, now returns to ER from rehab  because of increasing sob and fevers .Found to have positive COVID test ,seen by ID & pulm consult requested Palliative care consult requested ,to discuss advance directives and complete MOLST

## 2022-02-15 NOTE — PROGRESS NOTE ADULT - ASSESSMENT
61 yr old male ,known to me from [facility]  with med hx of  CAD, dilated cardiomyopathy, s/p ICD, atrial flutter on Eliquis, substance abuse was admitted initially from 12/24 for decompensated CHF, was on Milrinone and Bumex drips, left AMA on 12/31 and returned to the ED later in the day as he had to take care of personal matter. He reported shortness of breath and right arm and foot pain on presentation. Cardiology and Heart Failure specialist were consulted, he was placed on oral Bumex., subsequently transitioned to Bumex gtt. Bumex drip stopped on 1/12. Stopped  Milrinone 1/14 transitioned to oral Torsemide , metoprolol . Counseling given re diet and medication compliance .Not candidate for advanced therapies due to smoking/cocaine use history and non-compliance. Patient reported he was recently evicted from his apartment and is homeless, social work consult placed. Seen by PT and recommend  [facility]. The patient was medically stable for discharge.  Subsequently pt developed COVID-19 infection was admitted to [facility] and discharged a few days ago, now returns to ER from rehab  because of increasing sob and fevers .Found to have positive COVID test ,seen by ID & pulm consult requested Palliative care consult requested ,to discuss advance directives and complete MOL  (11 Feb 2022 13:41)      ACUTE RENAL FAILURE:   Serum creatinine is  at 1.6    , approximating GFR decreased .   There is no progression . No uremic symptoms  No evidence of anemia .  Fluid status stable.  Will continue to avoid nephrotoxic drugs.  Patient remains asymptomatic.   Continue current therapy.    BP monitoring,continue current antihypertensive meds, low salt diet,followup with PMD in 1-2 weeks  metoprolol succinate ER 25 milliGRAM(s) Oral daily    Admit for septic workup and ID evaluation,send blood and urine cx,serial lactate levels,monitor vitals closley,ivfs hydration,monitor urine output and renal profile,iv abx as per id cons  ceFAZolin   IVPB 2000 milliGRAM(s) IV Intermittent every 8 hours

## 2022-02-15 NOTE — PROGRESS NOTE ADULT - ASSESSMENT
The patient is a 61 year old male with a history of CAD, chronic systolic heart failure s/p ICD, atrial flutter s/p DCCV, substance abuse, CKD who is admitted with acute on chronic systolic heart failure, COVID.    Plan:  - ECG with IVCD and LVH related abnormality  - BNP significantly elevated at 26737 - was down to 08532 on discharge last month  - Echo 11/21 with severely reduced LV and RV function, mod/severe MR, mod TR  - CXR with bilateral pleural effusions  - Has required inotropes during previous admissions  - Continue bumetanide 2 mg IV q12h  - Continue metoprolol succinate 25 mg daily  - Resume spironolactone 25 mg daily  - Continue Entresto 24-26 mg bid  - Monitor potassium  - Continue amiodarone 200 mg daily  - Continue apixaban 5 mg bid  - Continue aspirin 81 mg daily  - Continue atorvastatin 40 mg daily  - COVID-19 positive  - On dexamethasone  - Staph bacteremia - on cefazolin. ID follow-up. Echo pending. If any additional imaging needed (i.e. VANESSA), recommend transfer back to [facility].  - Wean off HFNC

## 2022-02-15 NOTE — PROGRESS NOTE ADULT - ASSESSMENT
61 yr old male with CAD, dilated cardiomyopathy, s/p ICD, atrial flutter on Eliquis, substance abuse was admitted initially from 12/24 for decompensated CHF, was on Milrinone and Bumex drips, left AMA on 12/31 and returned to the ED later in the day as he had to take care of personal matter. He reported shortness of breath and right arm and foot pain on presentation. Cardiology and Heart Failure specialist were consulted, he was placed on oral Bumex., subsequently transitioned to Bumex gtt. Bumex drip stopped on 1/12. Stopped  Milrinone 1/14 transitioned to oral Torsemide , metoprolol . Counseling given re diet and medication compliance .Not candidate for advanced therapies due to smoking/cocaine use history and non-compliance. Patient reported he was recently evicted from his apartment and is homeless, social work consult placed. Seen by PT and recommend  [facility]. The patient was medically stable for discharge.  Subsequently pt developed COVID-19 infection was admitted to [facility] and discharged a few days ago, now returns to ER because of increasing sob and fevers.    remains COVID positive  Hypoxemia - resp distress  CKD -   Cardiomyopathy - Systolic CHF  Non - Domicile  AF hx  Substance Use Disorder    Emtresto added - Aldactone added -   covid pcr repeat NEG  on HF  on ABX - Ancef  Blood cx noted  VS noted  LABS reviewed    old records reviewed  tox screen reviewed  pt is homeless at present - no immediate family  uses [facility] - [facility] - Smoker - Opioids   remains Covid Positive  advanced Heart disease and end stage CHF  recent hospitalization at [facility]  fio2 support  counseling  emotional support  SW involvement  SBIRT  assist with needs  prognosis guarded

## 2022-02-15 NOTE — PROGRESS NOTE ADULT - ASSESSMENT
BIPIN DE LA CRUZ 61 m 2/11/2022 1960 DR KELVIN VANESSA     REVIEW OF SYMPTOMS      Able to give (reliable) ROS  NO     PHYSICAL EXAM    HEENT Unremarkable  atraumatic   RESP Fair air entry EXP prolonged    Harsh breath sound Resp distres mild   CARDIAC S1 S2 No S3     NO JVD    ABDOMEN SOFT BS PRESENT NOT DISTENDED No hepatosplenomegaly   PEDAL EDEMA present No calf tenderness  NO rash       DOA/CC/PROBLEMS poa .  2/11/2022 61 m as per EMS, SOB (D/C'D from [facility] 2/1 for covid)  shortness of breath    PROBLEMS.    Resp failure poa 2/11/2022  HFNC 50% on 2/11/2022   r Pl effs 2/11 cxr   COPD pmh    COVID (+) 2/11/2022   AICD  S aureus bacteremia mssa 2/11  Ancef 2/12/2022 Dr Phan    Lacticemia poa 2/11/2022 la 2.5   AOC CHF poa 2/11/2022 bnp 32537  pmh Dilated cmpthy  ECHO 11/20/2021 ef 20%  A fib (on eliquis) pmh   2/11/2022 apixaba 5.2       Hyponatremia poa 2/11/2022 Na 129-133  Hyperkalemia poa 2/11-2/12/2022 K 5.7-5  DERRICK poa 2/11-2/12/2022 Cr 1.6-1.6      MISC ISSUES.                       COVID  STATUS.   scv2 2/11/2022 (+)         ICU STAY. none  GOC.  2/13/2022 full code     BEST PRACTICE ISSUES.                                                  HEAD OF BED ELEVATION. Yes  DVT PROPHYLAXIS.  2/11/2022 apixaba 5.2  a fib     WHITMORE PROPHYLAXIS.     2/11/2022 protonix 40                                                                                    DIET.   soft bite 1.2 l fr              VITALS/PO/IO/VENT/DRIPS.  2/15/2022 afeb 70 120/80      ASSESSMENT/RECOMMENDATIONS.    HEMODYNAMICS.   Monitor bp Target MAP 65 (+)      PMH/PSH issues  were addressed.  Management continued/adjusted as indicated         RESP.   Monitor po Target po 90-95%    ABG.   2/11 50% hfnc 746/34/78    OXYGEN REQUIREMENTS.  2/15/2022  40% 20l 92%     COVID.  Pt has been COVID (+) since 1/23 2/11/2022 Dexa 6x 10 d  (Dr Dyson)       RESP FAILURE.  pt has hypoxemic failure   2/11/2022 Pt has been COVID (+) since 1/23 so ID does not think resp failure is sec to covid     COPD.  2/11/2022 pulmicort .5x2 DCed 2/14/2022        PROSTH/TUBES/LINES.  poa AICD     INFECTION.  Staph aureus bacteremia 2/11  W 2/11-2/12-2/13-2/15/2022 w 17-15 - 13 - 9.7   pr 2/12/2022 24  ECHO 2/13/2022 no endocarditis  cxr 2/11/2022 cxr nsc 1/14/2022   nsc mod r pl effsn or basal airspace disease   cm  Flu AB 2/11/2022 (-)   RSV 2/11/2022 (-)  blod c 2/14 (-)  blod c 2/12 staph a   blod c 2/11 mssa   abio 2/12/2022 ancef 1.3 Dr Garcia sa     CARDIAC.  HEMODYNAMICS.  Lacticemia poa 2/11/2022   la 2/11/2022 la 2.  AOC CHF poa 2/11/2022  ECHO 2/13/2022 no endocarditis severe lvsd mod mr mild ph pa 42   ECHO 11/20/2021 ef 20% deidra rve all severely enlargd pasp 37 mod to severe mr   bnp 2/11-2/12/2022 bnp 32407 - 73173   cxr 2/11/2022 cxr nsc 1/14/2022   nsc mod r pl effsn or basal airspace disease   cm  2/11/2022 bumetanide 2.2 iv   2/14/2022 sacubitril 24 valsartan 25 bid   2/14/2022 spironolact 25   A fib   2/11/2022 apixaba 5.2  2/11/2022 amiodarine  200   2/12/2022 metoprolol 25   CAD  2/11/2022 ekg nsr t wave abd consider lateral ischemia   2/11/2022 ASA 81   2/11/2022 atorvastat 40  A/R  Rx CHF   monitor k   Cont amiodarone apixaban   Cardio on case     TIME SPENT   Over 25 minutes aggregate care time spent on encounter; activities included   direct patient care, counseling and/or coordinating care reviewing notes, lab data/ imaging , discussion with multidisciplinary team/ patient  /family and explaining in detail risks, benefits, alternatives  of the recommendations     BIPIN DE LA CRUZ 61 m 2/11/2022 1960 DR KELVIN VANESSA

## 2022-02-16 NOTE — PROGRESS NOTE ADULT - ASSESSMENT
61 yr old male ,known to me from [facility]  with med hx of  CAD, dilated cardiomyopathy, s/p ICD, atrial flutter on Eliquis, substance abuse was admitted initially from 12/24 for decompensated CHF, was on Milrinone and Bumex drips, left AMA on 12/31 and returned to the ED later in the day as he had to take care of personal matter. He reported shortness of breath and right arm and foot pain on presentation. Cardiology and Heart Failure specialist were consulted, he was placed on oral Bumex., subsequently transitioned to Bumex gtt. Bumex drip stopped on 1/12. Stopped  Milrinone 1/14 transitioned to oral Torsemide , metoprolol . Counseling given re diet and medication compliance .Not candidate for advanced therapies due to smoking/cocaine use history and non-compliance. Patient reported he was recently evicted from his apartment and is homeless, social work consult placed. Seen by PT and recommend  [facility]. The patient was medically stable for discharge.  Subsequently pt developed COVID-19 infection was admitted to [facility] and discharged a few days ago, now returns to ER from rehab  because of increasing sob and fevers .Found to have positive COVID test ,seen by ID & pulm consult requested Palliative care consult requested ,to discuss advance directives and complete MOL  (11 Feb 2022 13:41)      ACUTE RENAL FAILURE:   Serum creatinine is  at 1.6    , approximating GFR decreased .   There is no progression . No uremic symptoms  No evidence of anemia .  Fluid status stable.  Will continue to avoid nephrotoxic drugs.  Patient remains asymptomatic.   Continue current therapy.    BP monitoring,continue current antihypertensive meds, low salt diet,followup with PMD in 1-2 weeks  metoprolol succinate ER 25 milliGRAM(s) Oral daily    Admit for septic workup and ID evaluation,send blood and urine cx,serial lactate levels,monitor vitals closley,ivfs hydration,monitor urine output and renal profile,iv abx as per id cons  ceFAZolin   IVPB 2000 milliGRAM(s) IV Intermittent every 8 hours

## 2022-02-16 NOTE — PROGRESS NOTE ADULT - ASSESSMENT
61 yr old male with CAD, dilated cardiomyopathy, s/p ICD, atrial flutter on Eliquis, substance abuse was admitted initially from 12/24 for decompensated CHF, was on Milrinone and Bumex drips, left AMA on 12/31 and returned to the ED later in the day as he had to take care of personal matter. He reported shortness of breath and right arm and foot pain on presentation. Cardiology and Heart Failure specialist were consulted, he was placed on oral Bumex., subsequently transitioned to Bumex gtt. Bumex drip stopped on 1/12. Stopped  Milrinone 1/14 transitioned to oral Torsemide , metoprolol . Counseling given re diet and medication compliance .Not candidate for advanced therapies due to smoking/cocaine use history and non-compliance. Patient reported he was recently evicted from his apartment and is homeless, social work consult placed. Seen by PT and recommend  [facility]. The patient was medically stable for discharge.  Subsequently pt developed COVID-19 infection was admitted to [facility] and discharged a few days ago, now returns to ER because of increasing sob and fevers.    remains COVID positive  Hypoxemia - resp distress  CKD -   Cardiomyopathy - Systolic CHF  Non - Domicile  AF hx  Substance Use Disorder    UA noted - Blood Cx noted - Covid PCR neg  Emtresto added - Aldactone added -   covid pcr repeat NEG  on HF  on ABX - Ancef  Blood cx noted  VS noted  LABS reviewed    old records reviewed  tox screen reviewed  pt is homeless at present - no immediate family  uses [facility] - [facility] - Smoker - Opioids   remains Covid Positive  advanced Heart disease and end stage CHF  recent hospitalization at [facility]  fio2 support  counseling  emotional support  SW involvement  SBIRT  assist with needs  prognosis guarded

## 2022-02-16 NOTE — CHART NOTE - NSCHARTNOTEFT_GEN_A_CORE
Called by RN for patient complaining of chest tightness. Patient admitted for acute hypoxic respiratory distress. He was most recently admitted at [facility] for management of COVID PNA and remains COVID positive during this admission. He also has a PMH/o chronic HFrEF s/p AICD and atrial flutter. Patient seen and examined at bedside. Endorses chest tightness, dyspnea, and cough but difficulty with expectoration. He otherwise denies acute chest pain, abd pain, N/V.      T(C): 36.3 (22 @ 07:12), Max: 36.8 (02-15-22 @ 19:54)  HR: 66 (22 @ 09:31) (61 - 78)  BP: 107/75 (22 @ 07:12) (103/68 - 120/86)  RR: 17 (22 @ 05:05) (17 - 18)  SpO2: 98% (22 @ 09:31) (92% - 98%)    Physical :  Gen- NAD, ncat  Cardio - s+1,s+2, rrr, no murmur  Lung - decreased breath sounds throughout bilateral lung fields, no w/r/r  Abdomen- +BS, NT/ND, no guarding, no rebound, no masses  Ext- no edema, 2+ pulses b/l      LABS:                        13.8   9.90  )-----------( 193      ( 2022 07:27 )             43.7     02-16    138  |  101  |  54<H>  ----------------------------<  135<H>  4.9   |  31  |  1.10    Ca    7.8<L>      2022 07:27      Urinalysis Basic - ( 15 Feb 2022 09:54 )  Color: Yellow / Appearance: Clear / S.015 / pH: x  Gluc: x / Ketone: Negative  / Bili: Negative / Urobili: Negative   Blood: x / Protein: 15 / Nitrite: Negative   Leuk Esterase: Negative / RBC: x / WBC 0-2   Sq Epi: x / Non Sq Epi: Occasional / Bacteria: x        Assessment/Plan  61 year old male with a history of CAD, end stage chronic systolic heart failure s/p ICD, atrial flutter s/p DCCV, substance abuse, CKD, COVID 19 22 admitted with acute hypoxic respiratory failure likely secondary to CHF exacerbation, now with S aureus bacteremia with unclear source. Now called by RN for chest tightness.    - Repeat EKG performed, reveals sinus rhythm with PVC, no concerning ST segment abnormalities though Tw inversion noted on aVL which has been seen on prior EKGs  - Start guaifenesin for help with sputum expectoration  - Start albuterol MDI prn for chest tightness/SOB  - RN to call with any changes

## 2022-02-16 NOTE — PROGRESS NOTE ADULT - ASSESSMENT
BIPIN DE LA CRUZ 61 m 2/11/2022 1960 DR KELVIN VANESSA     REVIEW OF SYMPTOMS      Able to give (reliable) ROS  NO     PHYSICAL EXAM    HEENT Unremarkable  atraumatic   RESP Fair air entry EXP prolonged    Harsh breath sound Resp distres mild   CARDIAC S1 S2 No S3     NO JVD    ABDOMEN SOFT BS PRESENT NOT DISTENDED No hepatosplenomegaly   PEDAL EDEMA present No calf tenderness  NO rash       DOA/CC/PROBLEMS poa .  2/11/2022 61 m as per EMS, SOB (D/C'D from [facility] 2/1 for covid)  shortness of breath    PROBLEMS.    Resp failure poa 2/11/2022  HFNC 50% on 2/11/2022 2/16/2022 4l   r Pl effs 2/11 cxr   COPD pmh    COVID (+) 2/11/2022   AICD  S aureus bacteremia mssa 2/11  Ancef 2/12/2022 Dr Phan    Lacticemia poa 2/11/2022 la 2.5   AOC CHF poa 2/11/2022 bnp 20759  pmh Dilated cmpthy  ECHO 11/20/2021 ef 20%  A fib (on eliquis) pmh   2/11/2022 apixaba 5.2     Hyponatremia poa 2/11/2022 Na 129-133  Hyperkalemia poa 2/11-2/12/2022 K 5.7-5  DERRICK poa 2/11-2/12/2022 Cr 1.6-1.6        MISC ISSUES.                       COVID  STATUS.   scv2 2/11/2022 (+)         ICU STAY. none  GOC.  2/13/2022 full code     BEST PRACTICE ISSUES.                                                  HEAD OF BED ELEVATION. Yes  DVT PROPHYLAXIS.  2/11/2022 apixaba 5.2  a fib     WHITMORE PROPHYLAXIS.     2/11/2022 protonix 40                                                                                    DIET.   soft bite 1.2 l fr              VITALS/PO/IO/VENT/DRIPS.  2/16/2022 afeb 67 100/70        ASSESSMENT/RECOMMENDATIONS.    HEMODYNAMICS.   Monitor bp Target MAP 65 (+)      PMH/PSH issues  were addressed.  Management continued/adjusted as indicated         RESP.   Monitor po Target po 90-95%    ABG.   2/11 50% hfnc 746/34/78    OXYGEN REQUIREMENTS.  2/16/2022 4l 98%     COVID.  Pt has been COVID (+) since 1/23 2/11/2022 Dexa 6x 10 d  (Dr Dyson)       RESP FAILURE.  pt has hypoxemic failure   2/11/2022 Pt has been COVID (+) since 1/23 so ID does not think resp failure is sec to covid     COPD.  2/11/2022 pulmicort .5x2 DCed 2/14/2022        PROSTH/TUBES/LINES.  poa AICD     INFECTION.  Staph aureus bacteremia 2/11  W 2/11-2/12-2/13-2/15/2022 w 17-15 - 13 - 9.7   pr 2/12/2022 24  ECHO 2/13/2022 no endocarditis  cxr 2/11/2022 cxr nsc 1/14/2022   nsc mod r pl effsn or basal airspace disease   cm  Flu AB 2/11/2022 (-)   RSV 2/11/2022 (-)  blod c 2/14 (-)  blod c 2/12 staph a   blod c 2/11 mssa   abio 2/12/2022 ancef 1.3 Dr Garcia sa     CARDIAC.  HEMODYNAMICS.  Lacticemia poa 2/11/2022   la 2/11/2022 la 2.  AOC CHF poa 2/11/2022  cr 2/16/2022 cr 1.1   ECHO 2/13/2022 no endocarditis severe lvsd mod mr mild ph pa 42   ECHO 11/20/2021 ef 20% deidra rve all severely enlargd pasp 37 mod to severe mr   bnp 2/11-2/12/2022 bnp 67396 - 44491   cxr 2/11/2022 cxr nsc 1/14/2022   nsc mod r pl effsn or basal airspace disease   cm  2/11/2022 bumetanide 2.2 iv   2/14/2022 sacubitril 24 valsartan 25 bid   2/14/2022 spironolact 25   A fib   2/11/2022 apixaba 5.2  2/11/2022 amiodarine  200   2/12/2022 metoprolol 25   CAD  2/11/2022 ekg nsr t wave abd consider lateral ischemia   2/11/2022 ASA 81   2/11/2022 atorvastat 40  A/R  Rx CHF   monitor k   Cont amiodarone apixaban   Cardio on case     TIME SPENT   Over 25 minutes aggregate care time spent on encounter; activities included   direct patient care, counseling and/or coordinating care reviewing notes, lab data/ imaging , discussion with multidisciplinary team/ patient  /family and explaining in detail risks, benefits, alternatives  of the recommendations     BIPIN DE LA CRUZ 61 m 2/11/2022 1960 DR KELVIN VANESSA

## 2022-02-16 NOTE — PROGRESS NOTE ADULT - ASSESSMENT
61 yr old male with CAD, dilated cardiomyopathy, s/p ICD, atrial flutter on Eliquis, substance abuse was admitted initially from 12/24 for decompensated CHF, was on Milrinone and Bumex drips, left AMA on 12/31 and returned to the ED later in the day as he had to take care of personal matter. He reported shortness of breath and right arm and foot pain on presentation. Cardiology and Heart Failure specialist were consulted, he was placed on oral Bumex., subsequently transitioned to Bumex gtt. Bumex drip stopped on 1/12. Stopped  Milrinone 1/14 transitioned to oral Torsemide , metoprolol . Counseling given re diet and medication compliance .Not candidate for advanced therapies due to smoking/cocaine use history and non-compliance. Patient reported he was recently evicted from his apartment and is homeless, social work consult placed. Seen by PT and recommend  [facility]. The patient was medically stable for discharge.  Subsequently pt developed COVID-19 infection was admitted to [facility] and discharged a few days ago, now returns to ER from rehab  because of increasing sob and fevers .Found to have positive COVID test ,seen by ID & pulm consult requested Palliative care consult requested ,to discuss advance directives and complete MOLST

## 2022-02-16 NOTE — PROGRESS NOTE ADULT - ASSESSMENT
The patient is a 61 year old male with a history of CAD, chronic systolic heart failure s/p ICD, atrial flutter s/p DCCV, substance abuse, CKD who is admitted with acute on chronic systolic heart failure, COVID.    Plan:  - ECG with IVCD and LVH related abnormality  - BNP significantly elevated at 9[address] - was down to 01662 on discharge last month  - Echo 11/21 with severely reduced LV and RV function, mod/severe MR, mod TR  - CXR with bilateral pleural effusions  - Has required inotropes during previous admissions  - Continue bumetanide 2 mg IV q12h  - Continue metoprolol succinate 25 mg daily  - Resume spironolactone 25 mg daily  - Continue Entresto 24-26 mg bid  - Monitor potassium  - Continue amiodarone 200 mg daily  - Continue apixaban 5 mg bid  - Continue aspirin 81 mg daily  - Continue atorvastatin 40 mg daily  - COVID-19 positive  - On dexamethasone  - Staph bacteremia - on cefazolin. ID follow-up. Echo pending. If any additional imaging needed (i.e. VANESSA), recommend transfer back to [facility].  - Wean off HFNC - will lower diuretics when off of HFNC

## 2022-02-17 NOTE — PROGRESS NOTE ADULT - ASSESSMENT
The patient is a 61 year old male with a history of CAD, chronic systolic heart failure s/p ICD, atrial flutter s/p DCCV, substance abuse, CKD who is admitted with acute on chronic systolic heart failure, COVID.    Plan:  - Echo 11/21 with severely reduced LV and RV function, mod/severe MR, mod TR  - Continue metoprolol succinate 25 mg daily  - Continue spironolactone 25 mg daily  - Continue Entresto 24-26 mg bid  - Continue amiodarone 200 mg daily  - Continue apixaban 5 mg bid  - Continue aspirin 81 mg daily  - Continue atorvastatin 40 mg daily  - COVID-19 positive  - On dexamethasone  - Staph bacteremia - on cefazolin. ID follow-up. Echo pending. If any additional imaging needed (i.e. VANESSA), recommend transfer back to [facility].  - Mild DERRICK noted - will lower bumetanide to 2 mg PO bid  - O2 requirements have improved - symptoms have improved as well

## 2022-02-17 NOTE — PROGRESS NOTE ADULT - ASSESSMENT
61 yr old male with CAD, dilated cardiomyopathy, s/p ICD, atrial flutter on Eliquis, substance abuse was admitted initially from 12/24 for decompensated CHF, was on Milrinone and Bumex drips, left AMA on 12/31 and returned to the ED later in the day as he had to take care of personal matter. He reported shortness of breath and right arm and foot pain on presentation. Cardiology and Heart Failure specialist were consulted, he was placed on oral Bumex., subsequently transitioned to Bumex gtt. Bumex drip stopped on 1/12. Stopped  Milrinone 1/14 transitioned to oral Torsemide , metoprolol . Counseling given re diet and medication compliance .Not candidate for advanced therapies due to smoking/cocaine use history and non-compliance. Patient reported he was recently evicted from his apartment and is homeless, social work consult placed. Seen by PT and recommend  [facility]. The patient was medically stable for discharge.  Subsequently pt developed COVID-19 infection was admitted to [facility] and discharged a few days ago, now returns to ER because of increasing sob and fevers.    remains COVID positive  Hypoxemia - resp distress  CKD -   Cardiomyopathy - Systolic CHF  Non - Domicile  AF hx  Substance Use Disorder    Albuterol - Robitussin Added -   UA noted - Blood Cx noted - Covid PCR neg  Emtresto added - Aldactone added -   on o2 support  on ABX - Ancef  Blood cx noted  VS noted  LABS reviewed    old records reviewed  tox screen reviewed  pt is homeless at present - no immediate family  uses [facility] - [facility] - Smoker - Opioids   remains Covid Positive  advanced Heart disease and end stage CHF  recent hospitalization at [facility]  fio2 support  counseling  emotional support  SW involvement  SBIRT  assist with needs  prognosis guarded

## 2022-02-17 NOTE — CHART NOTE - NSCHARTNOTEFT_GEN_A_CORE
Assessment:     Factors impacting intake: [ ] none [ ] nausea  [ ] vomiting [ ] diarrhea [ ] constipation  [ ]chewing problems [ ] swallowing issues  [ ] other:     Diet Presciption: Diet, Consistent Carbohydrate w/Evening Snack:   Soft and Bite Sized (SOFTBTSZ)  Low Sodium  Supplement Feeding Modality:  Oral  Glucerna Shake Cans or Servings Per Day:  1       Frequency:  Two Times a day (02-12-22 @ 11:55)    Intake:     Current Weight: Weight (kg): 71.6 (02-11 @ 21:23)  % Weight Change    Pertinent Medications: MEDICATIONS  (STANDING):  aMIOdarone    Tablet 200 milliGRAM(s) Oral daily  apixaban 5 milliGRAM(s) Oral every 12 hours  aspirin enteric coated 81 milliGRAM(s) Oral daily  atorvastatin 40 milliGRAM(s) Oral at bedtime  buMETAnide Injectable 2 milliGRAM(s) IV Push every 12 hours  ceFAZolin   IVPB 2000 milliGRAM(s) IV Intermittent every 8 hours  dexAMETHasone     Tablet 6 milliGRAM(s) Oral daily  gabapentin 100 milliGRAM(s) Oral three times a day  lactobacillus acidophilus 1 Tablet(s) Oral two times a day  lidocaine   4% Patch 1 Patch Transdermal daily  metoprolol succinate ER 25 milliGRAM(s) Oral daily  pantoprazole    Tablet 40 milliGRAM(s) Oral before breakfast  sacubitril 24 mG/valsartan 26 mG 1 Tablet(s) Oral two times a day  spironolactone 25 milliGRAM(s) Oral daily    MEDICATIONS  (PRN):  acetaminophen     Tablet .. 650 milliGRAM(s) Oral every 6 hours PRN Temp greater or equal to 38C (100.4F), Mild Pain (1 - 3)  ALBUTerol    90 MICROgram(s) HFA Inhaler 2 Puff(s) Inhalation every 6 hours PRN Shortness of Breath and/or Wheezing  aluminum hydroxide/magnesium hydroxide/simethicone Suspension 30 milliLiter(s) Oral every 4 hours PRN Dyspepsia  guaiFENesin Oral Liquid (Sugar-Free) 200 milliGRAM(s) Oral every 6 hours PRN Cough  melatonin 3 milliGRAM(s) Oral at bedtime PRN Insomnia  ondansetron Injectable 4 milliGRAM(s) IV Push every 8 hours PRN Nausea and/or Vomiting  simethicone 80 milliGRAM(s) Chew three times a day PRN Indigestion    Pertinent Labs: 02-16 Na138 mmol/L Glu 135 mg/dL<H> K+ 4.9 mmol/L Cr  1.10 mg/dL BUN 54 mg/dL<H> 02-12 Phos 4.1 mg/dL 02-12 Alb 2.3 g/dL<L> 02-12 Chol 139 mg/dL LDL --    HDL 20 mg/dL<L> Trig 90 mg/dL     CAPILLARY BLOOD GLUCOSE        Skin:     Estimated Needs:   [ ] no change since previous assessment  [ ] recalculated:     Previous Nutrition Diagnosis:   [ ] Inadequate Energy Intake [ ]Inadequate Oral Intake [ ] Excessive Energy Intake   [ ] Underweight [ ] Increased Nutrient Needs [ ] Overweight/Obesity   [ ] Altered GI Function [ ] Unintended Weight Loss [ ] Food & Nutrition Related Knowledge Deficit [ ] Malnutrition     Nutrition Diagnosis is [ ] ongoing  [ ] resolved [ ] not applicable     New Nutrition Diagnosis: [ ] not applicable       Interventions:   Recommend  [ ] Change Diet To:  [ ] Nutrition Supplement  [ ] Nutrition Support  [ ] Other:     Monitoring and Evaluation:   [ ] PO intake [ x ] Tolerance to diet prescription [ x ] weights [ x ] labs[ x ] follow up per protocol  [ ] other: Assessment:   61 yr old male ,known to me from [facility]  with med hx of  CAD, dilated cardiomyopathy, s/p ICD, atrial flutter on Eliquis, substance abuse was admitted initially from 12/24 for decompensated CHF, was on Milrinone and Bumex drips, left AMA on 12/31 and returned to the ED later in the day as he had to take care of personal matter. He reported shortness of breath and right arm and foot pain on presentation. Cardiology and Heart Failure specialist were consulted, he was placed on oral Bumex., subsequently transitioned to Bumex gtt. Bumex drip stopped on 1/12. Stopped  Milrinone 1/14 transitioned to oral Torsemide , metoprolol . Counseling given re diet and medication compliance .Not candidate for advanced therapies due to smoking/cocaine use history and non-compliance. Patient reported he was recently evicted from his apartment and is homeless, social work consult placed. Seen by PT and recommend  [facility]. The patient was medically stable for discharge.  Subsequently pt developed COVID-19 infection was admitted to [facility] and discharged a few days ago, now returns to ER from rehab  because of increasing sob and fevers .Found to have positive COVID test ,seen by ID & pulm consult requested Palliative care consult requested ,to discuss advance directives and complete MOLST  (11 Feb 2022 13:41)      Factors impacting intake: [ ] none [ ] nausea  [ ] vomiting [ ] diarrhea [ ] constipation  [ ]chewing problems [ ] swallowing issues  [ ] other:     Diet Presciption: Diet, Consistent Carbohydrate w/Evening Snack:   Soft and Bite Sized (SOFTBTSZ)  Low Sodium  Supplement Feeding Modality:  Oral  Glucerna Shake Cans or Servings Per Day:  1       Frequency:  Two Times a day (02-12-22 @ 11:55)    Intake:     Current Weight: Weight (kg): 71.6 (02-11 @ 21:23)  % Weight Change    Pertinent Medications: MEDICATIONS  (STANDING):  aMIOdarone    Tablet 200 milliGRAM(s) Oral daily  apixaban 5 milliGRAM(s) Oral every 12 hours  aspirin enteric coated 81 milliGRAM(s) Oral daily  atorvastatin 40 milliGRAM(s) Oral at bedtime  buMETAnide Injectable 2 milliGRAM(s) IV Push every 12 hours  ceFAZolin   IVPB 2000 milliGRAM(s) IV Intermittent every 8 hours  dexAMETHasone     Tablet 6 milliGRAM(s) Oral daily  gabapentin 100 milliGRAM(s) Oral three times a day  lactobacillus acidophilus 1 Tablet(s) Oral two times a day  lidocaine   4% Patch 1 Patch Transdermal daily  metoprolol succinate ER 25 milliGRAM(s) Oral daily  pantoprazole    Tablet 40 milliGRAM(s) Oral before breakfast  sacubitril 24 mG/valsartan 26 mG 1 Tablet(s) Oral two times a day  spironolactone 25 milliGRAM(s) Oral daily    MEDICATIONS  (PRN):  acetaminophen     Tablet .. 650 milliGRAM(s) Oral every 6 hours PRN Temp greater or equal to 38C (100.4F), Mild Pain (1 - 3)  ALBUTerol    90 MICROgram(s) HFA Inhaler 2 Puff(s) Inhalation every 6 hours PRN Shortness of Breath and/or Wheezing  aluminum hydroxide/magnesium hydroxide/simethicone Suspension 30 milliLiter(s) Oral every 4 hours PRN Dyspepsia  guaiFENesin Oral Liquid (Sugar-Free) 200 milliGRAM(s) Oral every 6 hours PRN Cough  melatonin 3 milliGRAM(s) Oral at bedtime PRN Insomnia  ondansetron Injectable 4 milliGRAM(s) IV Push every 8 hours PRN Nausea and/or Vomiting  simethicone 80 milliGRAM(s) Chew three times a day PRN Indigestion    Pertinent Labs: 02-16 Na138 mmol/L Glu 135 mg/dL<H> K+ 4.9 mmol/L Cr  1.10 mg/dL BUN 54 mg/dL<H> 02-12 Phos 4.1 mg/dL 02-12 Alb 2.3 g/dL<L> 02-12 Chol 139 mg/dL LDL --    HDL 20 mg/dL<L> Trig 90 mg/dL     CAPILLARY BLOOD GLUCOSE        Skin:     Estimated Needs:   [ ] no change since previous assessment  [ ] recalculated:     Previous Nutrition Diagnosis:   [ ] Inadequate Energy Intake [ ]Inadequate Oral Intake [ ] Excessive Energy Intake   [ ] Underweight [ ] Increased Nutrient Needs [ ] Overweight/Obesity   [ ] Altered GI Function [ ] Unintended Weight Loss [ ] Food & Nutrition Related Knowledge Deficit [ ] Malnutrition     Nutrition Diagnosis is [ ] ongoing  [ ] resolved [ ] not applicable     New Nutrition Diagnosis: [ ] not applicable       Interventions:   Recommend  [ ] Change Diet To:  [ ] Nutrition Supplement  [ ] Nutrition Support  [ ] Other:     Monitoring and Evaluation:   [ ] PO intake [ x ] Tolerance to diet prescription [ x ] weights [ x ] labs[ x ] follow up per protocol  [ ] other: Assessment: Pt Mr. Bruno Allen is a 60 YO homeless M  w/ pmhx of   CAD, dilated cardiomyopathy, s/p ICD, atrial flutter on Eliquis, substance abuse being treated for covid -19 and acute on chronic heart failure. Pt reports his appetite is good, likes the Glucerna , understands rationale for diet, unhappy bc he wants to be able to eat cake, wants larger breakfast and provided addtl food preferences . RN  Evita made aware and she will call pt doctor to place new SLP eval to see if diet texture can be upgraded. Diet office provided with updated preferences and requests within diet rx. Pt had a normal BM as recently as yesterday 2/16/22      Factors impacting intake: [ X] none [ ] nausea  [ ] vomiting [ ] diarrhea [ ] constipation  [ ]chewing problems [ ] swallowing issues  [ ] other:     Diet Prescription: Diet, Consistent Carbohydrate w/Evening Snack:   Soft and Bite Sized (SOFTBTSZ)  Low Sodium  Supplement Feeding Modality:  Oral  Glucerna Shake Cans or Servings Per Day:  1       Frequency:  Two Times a day (02-12-22 @ 11:55)    Intake: consistenty > 75%    Current Weight: Weight (kg): 71.6 (02-11 @ 21:23), ( 2/17/22) wt 70.5 kg /155.4#  % Weight Change: -1.1#/-1.5%    Pertinent Medications: MEDICATIONS  (STANDING):  aMIOdarone    Tablet 200 milliGRAM(s) Oral daily  apixaban 5 milliGRAM(s) Oral every 12 hours  aspirin enteric coated 81 milliGRAM(s) Oral daily  atorvastatin 40 milliGRAM(s) Oral at bedtime  buMETAnide Injectable 2 milliGRAM(s) IV Push every 12 hours  ceFAZolin   IVPB 2000 milliGRAM(s) IV Intermittent every 8 hours  dexAMETHasone     Tablet 6 milliGRAM(s) Oral daily  gabapentin 100 milliGRAM(s) Oral three times a day  lactobacillus acidophilus 1 Tablet(s) Oral two times a day  lidocaine   4% Patch 1 Patch Transdermal daily  metoprolol succinate ER 25 milliGRAM(s) Oral daily  pantoprazole    Tablet 40 milliGRAM(s) Oral before breakfast  sacubitril 24 mG/valsartan 26 mG 1 Tablet(s) Oral two times a day  spironolactone 25 milliGRAM(s) Oral daily    MEDICATIONS  (PRN):  acetaminophen     Tablet .. 650 milliGRAM(s) Oral every 6 hours PRN Temp greater or equal to 38C (100.4F), Mild Pain (1 - 3)  ALBUTerol    90 MICROgram(s) HFA Inhaler 2 Puff(s) Inhalation every 6 hours PRN Shortness of Breath and/or Wheezing  aluminum hydroxide/magnesium hydroxide/simethicone Suspension 30 milliLiter(s) Oral every 4 hours PRN Dyspepsia  guaiFENesin Oral Liquid (Sugar-Free) 200 milliGRAM(s) Oral every 6 hours PRN Cough  melatonin 3 milliGRAM(s) Oral at bedtime PRN Insomnia  ondansetron Injectable 4 milliGRAM(s) IV Push every 8 hours PRN Nausea and/or Vomiting  simethicone 80 milliGRAM(s) Chew three times a day PRN Indigestion    Pertinent Labs: 02-16 Na138 mmol/L Glu 135 mg/dL<H> K+ 4.9 mmol/L Cr  1.10 mg/dL BUN 54 mg/dL<H> 02-12 Phos 4.1 mg/dL 02-12 Alb 2.3 g/dL<L> 02-12 Chol 139 mg/dL LDL --    HDL 20 mg/dL<L> Trig 90 mg/dL     CAPILLARY BLOOD GLUCOSE        Skin: PI'S- stage II to sacrum, stage II to L buttock , stage II to coccyx    Estimated Needs:   [X ] no change since previous assessment  [ ] recalculated:     Previous Nutrition Diagnosis:   [ ] Inadequate Energy Intake [X ]Inadequate Oral Intake [ ] Excessive Energy Intake   [ ] Underweight [ ] Increased Nutrient Needs [ ] Overweight/Obesity [x] altered nutr related labs ( 2/16/22) na 138 wnl  [ ] Altered GI Function [ ] Unintended Weight Loss [ ] Food & Nutrition Related Knowledge Deficit [ ] Malnutrition     Nutrition Diagnosis is [ ] ongoing  [ X] resolved [ ] not applicable     New Nutrition Diagnosis: [ ] not applicable       Interventions:   Recommend  [ ] Change Diet To:  [ ] Nutrition Supplement  [ ] Nutrition Support  [ ] Other:     Monitoring and Evaluation:   [ ] PO intake [ x ] Tolerance to diet prescription [ x ] weights [ x ] labs[ x ] follow up per protocol  [ ] other: Assessment: Pt Mr. Bruno Allen is a 62 YO homeless M  w/ pmhx of   CAD, dilated cardiomyopathy, s/p ICD, atrial flutter on Eliquis, substance abuse being treated for covid -19 and acute on chronic heart failure. Pt reports his appetite is good, likes the Glucerna , understands rationale for diet, unhappy bc he wants to be able to eat cake, wants larger breakfast and provided addtl food preferences . RN  Evita made aware and she will call pt doctor to place new SLP eval to see if diet texture can be upgraded. Diet office provided with updated preferences and requests within diet rx. Pt had a normal BM as recently as yesterday 2/16/22      Factors impacting intake: [ X] none [ ] nausea  [ ] vomiting [ ] diarrhea [ ] constipation  [ ]chewing problems [ ] swallowing issues  [ ] other:     Diet Prescription: Diet, Consistent Carbohydrate w/Evening Snack:   Soft and Bite Sized (SOFTBTSZ)  Low Sodium  Supplement Feeding Modality:  Oral  Glucerna Shake Cans or Servings Per Day:  1       Frequency:  Two Times a day (02-12-22 @ 11:55)    Intake: consistenty > 75%    Current Weight: Weight (kg): 71.6 (02-11 @ 21:23), ( 2/17/22) wt 70.5 kg /155.4#  % Weight Change: -1.1#/-1.5%    Pertinent Medications: MEDICATIONS  (STANDING):  aMIOdarone    Tablet 200 milliGRAM(s) Oral daily  apixaban 5 milliGRAM(s) Oral every 12 hours  aspirin enteric coated 81 milliGRAM(s) Oral daily  atorvastatin 40 milliGRAM(s) Oral at bedtime  buMETAnide Injectable 2 milliGRAM(s) IV Push every 12 hours  ceFAZolin   IVPB 2000 milliGRAM(s) IV Intermittent every 8 hours  dexAMETHasone     Tablet 6 milliGRAM(s) Oral daily  gabapentin 100 milliGRAM(s) Oral three times a day  lactobacillus acidophilus 1 Tablet(s) Oral two times a day  lidocaine   4% Patch 1 Patch Transdermal daily  metoprolol succinate ER 25 milliGRAM(s) Oral daily  pantoprazole    Tablet 40 milliGRAM(s) Oral before breakfast  sacubitril 24 mG/valsartan 26 mG 1 Tablet(s) Oral two times a day  spironolactone 25 milliGRAM(s) Oral daily    MEDICATIONS  (PRN):  acetaminophen     Tablet .. 650 milliGRAM(s) Oral every 6 hours PRN Temp greater or equal to 38C (100.4F), Mild Pain (1 - 3)  ALBUTerol    90 MICROgram(s) HFA Inhaler 2 Puff(s) Inhalation every 6 hours PRN Shortness of Breath and/or Wheezing  aluminum hydroxide/magnesium hydroxide/simethicone Suspension 30 milliLiter(s) Oral every 4 hours PRN Dyspepsia  guaiFENesin Oral Liquid (Sugar-Free) 200 milliGRAM(s) Oral every 6 hours PRN Cough  melatonin 3 milliGRAM(s) Oral at bedtime PRN Insomnia  ondansetron Injectable 4 milliGRAM(s) IV Push every 8 hours PRN Nausea and/or Vomiting  simethicone 80 milliGRAM(s) Chew three times a day PRN Indigestion    Pertinent Labs: 02-16 Na138 mmol/L Glu 135 mg/dL<H> K+ 4.9 mmol/L Cr  1.10 mg/dL BUN 54 mg/dL<H> 02-12 Phos 4.1 mg/dL 02-12 Alb 2.3 g/dL<L> 02-12 Chol 139 mg/dL LDL --    HDL 20 mg/dL<L> Trig 90 mg/dL     CAPILLARY BLOOD GLUCOSE        Skin: PI'S- stage II to sacrum, stage II to L buttock , stage II to coccyx    Estimated Needs:   [X ] no change since previous assessment  [ ] recalculated:     Previous Nutrition Diagnosis:   [ ] Inadequate Energy Intake [X ]Inadequate Oral Intake [ ] Excessive Energy Intake   [ ] Underweight [ ] Increased Nutrient Needs [ ] Overweight/Obesity [x] altered nutr related labs ( 2/16/22) na 138 wnl  [ ] Altered GI Function [ ] Unintended Weight Loss [ ] Food & Nutrition Related Knowledge Deficit [ ] Malnutrition     Nutrition Diagnosis is [ ] ongoing  [ X] resolved [ ] not applicable     New Nutrition Diagnosis: [ ] not applicable       Interventions:   Recommend  [ ] Change Diet To:  [ ] Nutrition Supplement  [ ] Nutrition Support  [X ] Other: cont POC    Monitoring and Evaluation:   [ X] PO intake [ x ] Tolerance to diet prescription [ x ] weights [ x ] labs[ x ] follow up per protocol  [ ] other:

## 2022-02-17 NOTE — PROGRESS NOTE ADULT - ASSESSMENT
BIPIN DE LA CRUZ 61 m 2/11/2022 1960 DR KELVIN VANESSA     REVIEW OF SYMPTOMS      Able to give (reliable) ROS  NO     PHYSICAL EXAM    HEENT Unremarkable  atraumatic   RESP Fair air entry EXP prolonged    Harsh breath sound Resp distres mild   CARDIAC S1 S2 No S3     NO JVD    ABDOMEN SOFT BS PRESENT NOT DISTENDED No hepatosplenomegaly   PEDAL EDEMA present No calf tenderness  NO rash       DOA/CC/PROBLEMS poa .  2/11/2022 61 m as per EMS, SOB (D/C'D from [facility] 2/1 for covid)  shortness of breath    PROBLEMS.    Resp failure poa 2/11/2022  HFNC 50% on 2/11/2022 2/16/2022 4l   r Pl effs 2/11 cxr   COPD pmh    COVID (+) 2/11/2022   AICD  S aureus bacteremia mssa 2/11  Ancef 2/12/2022 Dr Phan    Lacticemia poa 2/11/2022 la 2.5   AOC CHF poa 2/11/2022 bnp [id number]  pmh Dilated cmpthy  ECHO 11/20/2021 ef 20%  A fib (on eliquis) pmh   2/11/2022 apixaba 5.2     Hyponatremia poa 2/11/2022 Na 129-133  Hyperkalemia poa 2/11-2/12/2022 K 5.7-5  EDRRICK poa 2/11-2/12/2022 Cr 1.6-1.6    MISC ISSUES.                       COVID  STATUS.   scv2 2/11/2022 (+)         ICU STAY. none  GOC.  2/13/2022 full code     BEST PRACTICE ISSUES.                                                  HEAD OF BED ELEVATION. Yes  DVT PROPHYLAXIS.  2/11/2022 apixaba 5.2  a fib     WHITMORE PROPHYLAXIS.     2/11/2022 protonix 40                                                                                    DIET.   soft bite 1.2 l fr      ASSESSMENT/RECOMMENDATIONS.    HEMODYNAMICS.   Monitor bp Target MAP 65 (+)      PMH/PSH issues  were addressed.  Management continued/adjusted as indicated         RESP.   Monitor po Target po 90-95%    ABG.   2/11 50% hfnc 746/34/78    OXYGEN REQUIREMENTS.  2/17/2022 4l 97%       PROSTH/TUBES/LINES.  poa AICD     INFECTION.  Staph aureus bacteremia 2/11  W 2/11-2/12-2/13-2/15/2022 w 17-15 - 13 - 9.7   pr 2/12/2022 24  ECHO 2/13/2022 no endocarditis  cxr 2/11/2022 cxr nsc 1/14/2022   nsc mod r pl effsn or basal airspace disease   cm  Flu AB 2/11/2022 (-)   RSV 2/11/2022 (-)  blod c 2/14 (-)  blod c 2/12 staph a   blod c 2/11 mssa   abio 2/12/2022 ancef 1.3 Dr Jose hernandez     CARDIAC.  HEMODYNAMICS.  Lacticemia poa 2/11/2022   la 2/11/2022 la 2.  AOC CHF poa 2/11/2022  cr 2/16/2022 cr 1.1   ECHO 2/13/2022 no endocarditis severe lvsd mod mr mild ph pa 42   ECHO 11/20/2021 ef 20% deidra rve all severely enlargd pasp 37 mod to severe mr   bnp 2/11-2/12/2022 bnp [id number]   cxr 2/11/2022 cxr nsc 1/14/2022   nsc mod r pl effsn or basal airspace disease   cm  2/11/2022 bumetanide 2.2 iv   2/14/2022 sacubitril 24 valsartan 25 bid   2/14/2022 spironolact 25   A fib   2/11/2022 apixaba 5.2  2/11/2022 amiodarine  200   2/12/2022 metoprolol 25   CAD  2/11/2022 ekg nsr t wave abd consider lateral ischemia   2/11/2022 ASA 81   2/11/2022 atorvastat 40  A/R  Rx CHF   monitor k   Cont amiodarone apixaban   Cardio on case       TIME SPENT   Over 25 minutes aggregate care time spent on encounter; activities included   direct patient care, counseling and/or coordinating care reviewing notes, lab data/ imaging , discussion with multidisciplinary team/ patient  /family and explaining in detail risks, benefits, alternatives  of the recommendations     BIPIN DE LA CRUZ 61 m 2/11/2022 1960 DR KELVIN VANESSA

## 2022-02-17 NOTE — PROGRESS NOTE ADULT - ASSESSMENT
61 yr old male with CAD, dilated cardiomyopathy, s/p ICD, atrial flutter on Eliquis, substance abuse was admitted initially from 12/24 for decompensated CHF, was on Milrinone and Bumex drips, left AMA on 12/31 and returned to the ED later in the day as he had to take care of personal matter. He reported shortness of breath and right arm and foot pain on presentation. Cardiology and Heart Failure specialist were consulted, he was placed on oral Bumex., subsequently transitioned to Bumex gtt. Bumex drip stopped on 1/12. Stopped  Milrinone 1/14 transitioned to oral Torsemide , metoprolol . Counseling given re diet and medication compliance .Not candidate for advanced therapies due to smoking/cocaine use history and non-compliance. Patient reported he was recently evicted from his apartment and is homeless, social work consult placed. Seen by PT and recommend  [facility]. The patient was medically stable for discharge.  Subsequently pt developed COVID-19 infection was admitted to [facility] and discharged a few days ago, now returns to ER from rehab  because of increasing sob and fevers .Found to have positive COVID test ,seen by ID & pulm consult requested Palliative care consult requested ,to discuss advance directives and complete MOLST

## 2022-02-17 NOTE — SBIRT NOTE ADULT - NSSBIRTDRGPOSREINDET_GEN_A_CORE
pt currently living in a nursing home, reports he last smoked marijuana 8 months ago, uses no other drugs at this time. Support provided, pt will return back to SNF on DC.

## 2022-02-17 NOTE — PROGRESS NOTE ADULT - ASSESSMENT
61 yr old male ,known to me from [facility]  with med hx of  CAD, dilated cardiomyopathy, s/p ICD, atrial flutter on Eliquis, substance abuse was admitted initially from 12/24 for decompensated CHF, was on Milrinone and Bumex drips, left AMA on 12/31 and returned to the ED later in the day as he had to take care of personal matter. He reported shortness of breath and right arm and foot pain on presentation. Cardiology and Heart Failure specialist were consulted, he was placed on oral Bumex., subsequently transitioned to Bumex gtt. Bumex drip stopped on 1/12. Stopped  Milrinone 1/14 transitioned to oral Torsemide , metoprolol . Counseling given re diet and medication compliance .Not candidate for advanced therapies due to smoking/cocaine use history and non-compliance. Patient reported he was recently evicted from his apartment and is homeless, social work consult placed. Seen by PT and recommend  [facility]. The patient was medically stable for discharge.  Subsequently pt developed COVID-19 infection was admitted to [facility] and discharged a few days ago, now returns to ER from rehab  because of increasing sob and fevers .Found to have positive COVID test ,seen by ID & pulm consult requested Palliative care consult requested ,to discuss advance directives and complete MOL  (11 Feb 2022 13:41)      ACUTE RENAL FAILURE:   Serum creatinine is  at 1.6    , approximating GFR decreased .   There is no progression . No uremic symptoms  No evidence of anemia .  Fluid status stable.  Will continue to avoid nephrotoxic drugs.  Patient remains asymptomatic.   Continue current therapy.    BP monitoring,continue current antihypertensive meds, low salt diet,followup with PMD in 1-2 weeks  metoprolol succinate ER 25 milliGRAM(s) Oral daily    Admit for septic workup and ID evaluation,send blood and urine cx,serial lactate levels,monitor vitals closley,ivfs hydration,monitor urine output and renal profile,iv abx as per id cons  ceFAZolin   IVPB 2000 milliGRAM(s) IV Intermittent every 8 hours

## 2022-02-18 NOTE — PROGRESS NOTE ADULT - ASSESSMENT
The patient is a 61 year old male with a history of CAD, chronic systolic heart failure s/p ICD, atrial flutter s/p DCCV, substance abuse, CKD who is admitted with acute on chronic systolic heart failure, COVID.    Plan:  - Echo 11/21 with severely reduced LV and RV function, mod/severe MR, mod TR  - Continue metoprolol succinate 25 mg daily  - Continue spironolactone 25 mg daily  - Continue Entresto 24-26 mg bid  - Continue amiodarone 200 mg daily  - Continue apixaban 5 mg bid  - Continue aspirin 81 mg daily  - Continue atorvastatin 40 mg daily  - COVID-19 positive  - On dexamethasone  - Staph bacteremia - on cefazolin. ID follow-up. Echo pending. If any additional imaging needed (i.e. VANESSA), recommend transfer back to [facility].  - Continue bumetanide 2 mg PO bid  - Wean off O2

## 2022-02-18 NOTE — PROGRESS NOTE ADULT - ASSESSMENT
61 yr old male with CAD, dilated cardiomyopathy, s/p ICD, atrial flutter on Eliquis, substance abuse was admitted initially from 12/24 for decompensated CHF, was on Milrinone and Bumex drips, left AMA on 12/31 and returned to the ED later in the day as he had to take care of personal matter. He reported shortness of breath and right arm and foot pain on presentation. Cardiology and Heart Failure specialist were consulted, he was placed on oral Bumex., subsequently transitioned to Bumex gtt. Bumex drip stopped on 1/12. Stopped  Milrinone 1/14 transitioned to oral Torsemide , metoprolol . Counseling given re diet and medication compliance .Not candidate for advanced therapies due to smoking/cocaine use history and non-compliance. Patient reported he was recently evicted from his apartment and is homeless, social work consult placed. Seen by PT and recommend  [facility]. The patient was medically stable for discharge.  Subsequently pt developed COVID-19 infection was admitted to [facility] and discharged a few days ago, now returns to ER because of increasing sob and fevers.    remains COVID positive  Hypoxemia - resp distress  CKD -   Cardiomyopathy - Systolic CHF  Non - Domicile  AF hx  Substance Use Disorder    BUMEX added - Diuresis -   Albuterol - Robitussin Added -   UA noted - Blood Cx noted - Covid PCR neg  Emtresto added - Aldactone added -   on o2 support  on ABX - Ancef  Blood cx noted  VS noted  LABS reviewed    old records reviewed  tox screen reviewed  pt is homeless at present - no immediate family  uses Etoh - [facility] - Smoker - Opioids   remains Covid Positive  advanced Heart disease and end stage CHF  recent hospitalization at [facility]  fio2 support  counseling  emotional support  SW involvement  SBIRT  assist with needs  prognosis guarded

## 2022-02-18 NOTE — PROGRESS NOTE ADULT - ASSESSMENT
61 yr old male with CAD, dilated cardiomyopathy, s/p ICD, atrial flutter on Eliquis, substance abuse was admitted initially from 12/24 for decompensated CHF, was on Milrinone and Bumex drips, left AMA on 12/31 and returned to the ED later in the day as he had to take care of personal matter. He reported shortness of breath and right arm and foot pain on presentation. Cardiology and Heart Failure specialist were consulted, he was placed on oral Bumex., subsequently transitioned to Bumex gtt. Bumex drip stopped on 1/12. Stopped  Milrinone 1/14 transitioned to oral Torsemide , metoprolol . Counseling given re diet and medication compliance .Not candidate for advanced therapies due to smoking/cocaine use history and non-compliance. Patient reported he was recently evicted from his apartment and is homeless, social work consult placed. Seen by PT and recommend  [facility]INCITO. The patient was medically stable for discharge.  Subsequently pt developed COVID-19 infection was admitted to [facility] and discharged a few days ago, now returns to ER from rehab  because of increasing sob and fevers .Found to have positive COVID test ,seen by ID & pulm consult requested Palliative care consult requested ,to discuss advance directives and complete MOLST

## 2022-02-18 NOTE — PROGRESS NOTE ADULT - ASSESSMENT
61 yr old male ,known to me from [facility]  with med hx of  CAD, dilated cardiomyopathy, s/p ICD, atrial flutter on Eliquis, substance abuse was admitted initially from 12/24 for decompensated CHF, was on Milrinone and Bumex drips, left AMA on 12/31 and returned to the ED later in the day as he had to take care of personal matter. He reported shortness of breath and right arm and foot pain on presentation. Cardiology and Heart Failure specialist were consulted, he was placed on oral Bumex., subsequently transitioned to Bumex gtt. Bumex drip stopped on 1/12. Stopped  Milrinone 1/14 transitioned to oral Torsemide , metoprolol . Counseling given re diet and medication compliance .Not candidate for advanced therapies due to smoking/cocaine use history and non-compliance. Patient reported he was recently evicted from his apartment and is homeless, social work consult placed. Seen by PT and recommend  [facility]. The patient was medically stable for discharge.  Subsequently pt developed COVID-19 infection was admitted to [facility] and discharged a few days ago, now returns to ER from rehab  because of increasing sob and fevers .Found to have positive COVID test ,seen by ID & pulm consult requested Palliative care consult requested ,to discuss advance directives and complete MOL  (11 Feb 2022 13:41)      ACUTE RENAL FAILURE:   Serum creatinine is  at 1.6    , approximating GFR decreased .   There is no progression . No uremic symptoms  No evidence of anemia .  Fluid status stable.  Will continue to avoid nephrotoxic drugs.  Patient remains asymptomatic.   Continue current therapy.    BP monitoring,continue current antihypertensive meds, low salt diet,followup with PMD in 1-2 weeks  metoprolol succinate ER 25 milliGRAM(s) Oral daily    Admit for septic workup and ID evaluation,send blood and urine cx,serial lactate levels,monitor vitals closley,ivfs hydration,monitor urine output and renal profile,iv abx as per id cons  ceFAZolin   IVPB 2000 milliGRAM(s) IV Intermittent every 8 hours

## 2022-02-18 NOTE — PROCEDURE NOTE - NSICDXPROCEDURE_GEN_ALL_CORE_FT
PROCEDURES:  Placement of peripherally inserted central catheter (PICC) with imaging guidance 18-Feb-2022 18:08:43  Adarsh Valadez

## 2022-02-18 NOTE — PROGRESS NOTE ADULT - ASSESSMENT
BIPIN DE LA CRUZ 61 m 2/11/2022 1960 DR KELVIN VANESSA     REVIEW OF SYMPTOMS      Able to give (reliable) ROS  NO     PHYSICAL EXAM    HEENT Unremarkable  atraumatic   RESP Fair air entry EXP prolonged    Harsh breath sound Resp distres mild   CARDIAC S1 S2 No S3     NO JVD    ABDOMEN SOFT BS PRESENT NOT DISTENDED No hepatosplenomegaly   PEDAL EDEMA present No calf tenderness  NO rash       DOA/CC/PROBLEMS poa .  2/11/2022 61 m as per EMS, SOB (D/C'D from [facility] 2/1 for covid)  shortness of breath    PROBLEMS.    Resp failure poa 2/11/2022  HFNC 50% on 2/11/2022 2/16/2022 4l   r Pl effs 2/11 cxr   COPD pmh    COVID (+) 2/11/2022   AICD  S aureus bacteremia mssa 2/11  Ancef 2/12/2022 Dr Phan    Lacticemia poa 2/11/2022 la 2.5   AOC CHF poa 2/11/2022 bnp 29181  pmh Dilated cmpthy  ECHO 11/20/2021 ef 20%  A fib (on eliquis) pmh   2/11/2022 apixaba 5.2     Hyponatremia poa 2/11/2022 Na 129-133  Hyperkalemia poa 2/11-2/12/2022 K 5.7-5  DERRICK poa 2/11-2/12/2022 Cr 1.6-1.6     ASSESSMENT/RECOMMENDATIONS.    HEMODYNAMICS.   Monitor bp Target MAP 65 (+)      PMH/PSH issues  were addressed.  Management continued/adjusted as indicated         RESP.   Monitor po Target po 90-95%    ABG.   2/11 50% hfnc 746/34/78    OXYGEN REQUIREMENTS.  2/17/2022 4l 97%       PROSTH/TUBES/LINES.  poa AICD     INFECTION.  Staph aureus bacteremia 2/11  W 2/11-2/12-2/13-2/15/2022 w 17-15 - 13 - 9.7   pr 2/12/2022 24  ECHO 2/13/2022 no endocarditis  cxr 2/11/2022 cxr nsc 1/14/2022   nsc mod r pl effsn or basal airspace disease   cm  Flu AB 2/11/2022 (-)   RSV 2/11/2022 (-)  blod c 2/14 (-)  blod c 2/12 staph a   blod c 2/11 mssa   abio 2/12/2022 ancef 1.3 Dr Jose hernandez     CARDIAC.  HEMODYNAMICS.  Lacticemia poa 2/11/2022   la 2/11/2022 la 2.  AOC CHF poa 2/11/2022  cr 2/16/2022 cr 1.1   ECHO 2/13/2022 no endocarditis severe lvsd mod mr mild ph pa 42   ECHO 11/20/2021 ef 20% deidra rve all severely enlargd pasp 37 mod to severe mr   bnp 2/11-2/12/2022 bnp 70806 - 26814   cxr 2/11/2022 cxr nsc 1/14/2022   nsc mod r pl effsn or basal airspace disease   cm  2/11/2022 bumetanide 2.2 iv   2/14/2022 sacubitril 24 valsartan 25 bid   2/14/2022 spironolact 25   A fib   2/11/2022 apixaba 5.2  2/11/2022 amiodarine  200   2/12/2022 metoprolol 25   CAD  2/11/2022 ekg nsr t wave abd consider lateral ischemia   2/11/2022 ASA 81   2/11/2022 atorvastat 40  A/R  Rx CHF   monitor k   Cont amiodarone apixaban   Cardio on case     TIME SPENT   Over 25 minutes aggregate care time spent on encounter; activities included   direct patient care, counseling and/or coordinating care reviewing notes, lab data/ imaging , discussion with multidisciplinary team/ patient  /family and explaining in detail risks, benefits, alternatives  of the recommendations     BIPIN DE LA CRUZ 61 m 2/11/2022 1960 DR KELVIN VANESSA

## 2022-02-18 NOTE — PROCEDURE NOTE - PROCEDURE FINDINGS AND DETAILS
38cm bard power picc inserted into patent right basilic vein under sterile conditions and ultrasound and fluoroscopic guidance.

## 2022-02-19 NOTE — PROGRESS NOTE ADULT - ASSESSMENT
BIPIN DE LA CRUZ 61 m 2/11/2022 1960 DR KELVIN VANESSA     REVIEW OF SYMPTOMS      Able to give (reliable) ROS  NO     PHYSICAL EXAM    HEENT Unremarkable  atraumatic   RESP Fair air entry EXP prolonged    Harsh breath sound Resp distres mild   CARDIAC S1 S2 No S3     NO JVD    ABDOMEN SOFT BS PRESENT NOT DISTENDED No hepatosplenomegaly   PEDAL EDEMA present No calf tenderness  NO rash       DOA/CC/PROBLEMS poa .  2/11/2022 61 m as per EMS, SOB (D/C'D from [facility] 2/1 for covid)  shortness of breath    PROBLEMS.    Resp failure poa 2/11/2022  HFNC 50% on 2/11/2022 2/16/2022 4l   2/17 32%  742/53/85   r Pl effs 2/11 cxr   COPD pmh    COVID (+) 2/11/2022   AICD  S aureus bacteremia mssa 2/11  Ancef 2/12/2022 Dr Phan    Lacticemia poa 2/11/2022 la 2.5   AOC CHF poa 2/11/2022 bnp 40447  pmh Dilated cmpthy  ECHO 11/20/2021 ef 20%  A fib (on eliquis) pmh   2/11/2022 apixaba 5.2     Hyponatremia poa 2/11/2022 Na 129-133  Hyperkalemia poa 2/11-2/12/2022 K 5.7-5  DERRICK poa 2/11-2/12/2022 Cr 1.6-1.6      MISC ISSUES.                       COVID  STATUS.   scv2 2/11/2022 (+)         ICU STAY. none  GOC.  2/13/2022 full code     BEST PRACTICE ISSUES.                                                  HEAD OF BED ELEVATION. Yes  DVT PROPHYLAXIS.  2/11/2022 apixaba 5.2  a fib     WHITMORE PROPHYLAXIS.     2/11/2022 protonix 40                                                                                    DIET.   soft bite 1.2 l fr               ASSESSMENT/RECOMMENDATIONS.    HEMODYNAMICS.   Monitor bp Target MAP 65 (+)      PMH/PSH issues  were addressed.  Management continued/adjusted as indicated         RESP.   Monitor po Target po 90-95%    ABG.   2/11 50% hfnc 746/34/78    OXYGEN REQUIREMENTS.  2/17/2022 4l 97%     COVID.  Pt has been COVID (+) since 1/23 2/11/2022 Dexa 6x 10 d  (Dr Dyson)       RESP FAILURE.  pt has hypoxemic failure   2/11/2022 Pt has been COVID (+) since 1/23 so ID does not think resp failure is sec to covid     COPD.  2/11/2022 pulmicort .5x2 DCed 2/14/2022          PROSTH/TUBES/LINES.  poa AICD     INFECTION.  Staph aureus bacteremia 2/11  W 2/11-2/12-2/13-2/15/2022 w 17-15 - 13 - 9.7   pr 2/12/2022 24  ECHO 2/13/2022 no endocarditis  cxr 2/11/2022 cxr nsc 1/14/2022   nsc mod r pl effsn or basal airspace disease   cm  Flu AB 2/11/2022 (-)   RSV 2/11/2022 (-)  blod c 2/14 (-)  blod c 2/12 staph a   blod c 2/11 mssa   abio 2/12/2022 ancef 1.3 Dr Garcia sa     CARDIAC.  HEMODYNAMICS.  Lacticemia poa 2/11/2022   la 2/11/2022 la 2.  AOC CHF poa 2/11/2022  cr 2/16/2022 cr 1.1   ECHO 2/13/2022 no endocarditis severe lvsd mod mr mild ph pa 42   ECHO 11/20/2021 ef 20% deidra rve all severely enlargd pasp 37 mod to severe mr   bnp 2/11-2/12/2022 bnp 99700 - 53783   cxr 2/11/2022 cxr nsc 1/14/2022   nsc mod r pl effsn or basal airspace disease   cm  2/11/2022 bumetanide 2.2 iv   2/14/2022 sacubitril 24 valsartan 25 bid   2/14/2022 spironolact 25   A fib   2/11/2022 apixaba 5.2  2/11/2022 amiodarine  200   2/12/2022 metoprolol 25   CAD  2/11/2022 ekg nsr t wave abd consider lateral ischemia   2/11/2022 ASA 81   2/11/2022 atorvastat 40  A/R  Rx CHF   monitor k   Cont amiodarone apixaban   Cardio on case       DERRICK   Cr 2/11-2/12-2/13-2/14-2/15/2022 Cr 1.6 - 1.6- 1.5-1.4- 1.2   monitor     Elevated LFTS   LFTS 2/11-2/12/2022   -293  AST 55-34  ALT 53 -42  Likely sec to chf        TIME SPENT   Over 25 minutes aggregate care time spent on encounter; activities included   direct patient care, counseling and/or coordinating care reviewing notes, lab data/ imaging , discussion with multidisciplinary team/ patient  /family and explaining in detail risks, benefits, alternatives  of the recommendations     BIPIN DE LA CRUZ 61 m 2/11/2022 1960 DR KELVIN VANESSA

## 2022-02-19 NOTE — PROVIDER CONTACT NOTE (OTHER) - ACTION/TREATMENT ORDERED:
Pt educated on the risks of refusing PICC, but refusing PICC at this time. MD notified
Provider verbalized understanding and midodrine to be ordered.

## 2022-02-19 NOTE — PROGRESS NOTE ADULT - ASSESSMENT
61 year old male with a history of CAD, end stage chronic systolic heart failure s/p ICD, atrial flutter s/p DCCV, substance abuse, CKD COVID 19 1/23/22 admitted with Acute hypoxic respiratory failure likely sec CHF exacerbation Markedly elevated BNP. On HFNC with Staph Aureus bacteremia  Source of bacteremia unclear   Prob skin source- pt c/o mild tenderness at old IV sites on left arm though no appreciable induration erythema or palpable cord  Has AICD Echo neg for endocarditis  C/o back pain  Had gallium scan ? cervical discitis  Unable to do MRI due to AICD  COVID 19- pt had been positive since 1/23/22.  Pt is vaccinated against COVID 19.    Plan :  Cont Ancef  2grams q8 x 6 weeks 3/26/22  PICC line  Fu repeat blood cultures- NGTD  Off Decadron   Diurese per cardiology  Trend temps and cbc    Infectious Diseases will continue to follow. Please call with any questions.   Shila Frazier M.D.  [facility], Division of Infectious Diseases [phone] 61 year old male with a history of CAD, end stage chronic systolic heart failure s/p ICD, atrial flutter s/p DCCV, substance abuse, CKD COVID 19 1/23/22 admitted with Acute hypoxic respiratory failure likely sec CHF exacerbation Markedly elevated BNP. On HFNC with Staph Aureus bacteremia  Source of bacteremia unclear   Prob skin source- pt c/o mild tenderness at old IV sites on left arm though no appreciable induration erythema or palpable cord  Has AICD Echo neg for endocarditis  C/o back pain  Had gallium scan ? cervical discitis  Unable to do MRI due to AICD  COVID 19- pt had been positive since 1/23/22.  Pt is vaccinated against COVID 19.    Plan :  Cont Ancef  2grams q8 x 6 weeks 3/26/22  PICC line placed on 2/18  Fu repeat blood cultures- NGTD  Off Decadron   Diurese per cardiology  Trend temps and cbc    Infectious Diseases will continue to follow. Please call with any questions.   Shila Frazier M.D.  [facility], Division of Infectious Diseases [phone]

## 2022-02-19 NOTE — DISCHARGE NOTE PROVIDER - CARE PROVIDER_API CALL
Nikita Roblero  Infectious Diseases  [address] [place] [address]  Phone: [phone]  Fax: [phone]  Follow Up Time: 1 week    Cesar Dyson)  Critical Care Medicine; HospicePalliative Medicine; Internal Medicine; Pulmonary Disease  [address] [place] [address]  Phone: [phone]  Fax: [phone]  Follow Up Time: 1 week    Sanya Zapien)  Cardiovascular Disease; Internal Medicine  [address], [address]  [place] [address]  Phone: [phone]  Fax: [phone]  Follow Up Time: 2 weeks    Pahlavan, Mohsen (MD)  Medicine  [address], [address]  [place] [address]  Phone: [phone]  Fax: [phone]  Follow Up Time: 2 weeks

## 2022-02-19 NOTE — PROGRESS NOTE ADULT - ASSESSMENT
61 yr old male with CAD, dilated cardiomyopathy, s/p ICD, atrial flutter on Eliquis, substance abuse was admitted initially from 12/24 for decompensated CHF, was on Milrinone and Bumex drips, left AMA on 12/31 and returned to the ED later in the day as he had to take care of personal matter. He reported shortness of breath and right arm and foot pain on presentation. Cardiology and Heart Failure specialist were consulted, he was placed on oral Bumex., subsequently transitioned to Bumex gtt. Bumex drip stopped on 1/12. Stopped  Milrinone 1/14 transitioned to oral Torsemide , metoprolol . Counseling given re diet and medication compliance .Not candidate for advanced therapies due to smoking/cocaine use history and non-compliance. Patient reported he was recently evicted from his apartment and is homeless, social work consult placed. Seen by PT and recommend  [facility]. The patient was medically stable for discharge.  Subsequently pt developed COVID-19 infection was admitted to [facility] and discharged a few days ago, now returns to ER because of increasing sob and fevers.    remains COVID positive  Hypoxemia - resp distress  CKD -   Cardiomyopathy - Systolic CHF  Non - Domicile  AF hx  Substance Use Disorder    PICC inserted  BUMEX added - Diuresis -   Albuterol - Robitussin Added -   UA noted - Blood Cx noted - Covid PCR neg  Emtresto added - Aldactone added -   on o2 support  on ABX - Ancef  Blood cx noted  VS noted  LABS reviewed    old records reviewed  tox screen reviewed  pt is homeless at present - no immediate family  uses Etoh - [facility] - Smoker - Opioids   remains Covid Positive  advanced Heart disease and end stage CHF  recent hospitalization at [facility]  fio2 support  counseling  emotional support  SW involvement  SBIRT  assist with needs  prognosis guarded

## 2022-02-19 NOTE — PROGRESS NOTE ADULT - ASSESSMENT
The patient is a 61 year old male with a history of CAD, chronic systolic heart failure s/p ICD, atrial flutter s/p DCCV, substance abuse, CKD who is admitted with acute on chronic systolic heart failure, COVID.    Plan:  - Echo 11/21 with severely reduced LV and RV function, mod/severe MR, mod TR  - Continue metoprolol succinate 25 mg daily  - Continue spironolactone 25 mg daily  - Continue Entresto 24-26 mg bid  - Continue amiodarone 200 mg daily  - Continue apixaban 5 mg bid  - Continue aspirin 81 mg daily  - Continue atorvastatin 40 mg daily  - COVID-19 positive  - On dexamethasone  - Staph bacteremia - on cefazolin. TTE with no obvious endocarditis. PICC placed.  - Continue bumetanide 2 mg PO bid  - Discharge planning

## 2022-02-19 NOTE — CHART NOTE - NSCHARTNOTEFT_GEN_A_CORE
Called by RN for hypotension, BP 82/60; MAP 67. BPs persistently low throughout the day with MAPs >65  - Patient asymptomatic from CV standpoint though little to no ambulation   - Echo 11/21 with severely reduced LV and RV function, mod/severe MR, mod TR; on Bumex BID for diuresis   - Given severe cardiomyopathy will avoid IVF, give 5mg midodrine x1 and reassess BP; other VS stable at this time  - Will follow closely   - RN to call with any changes

## 2022-02-19 NOTE — DISCHARGE NOTE PROVIDER - CARE PROVIDERS DIRECT ADDRESSES
,DirectAddress_Unknown,DirectAddress_Unknown,DirectAddress_Unknown,[email or web address]

## 2022-02-19 NOTE — PROVIDER CONTACT NOTE (OTHER) - SITUATION
Pt refusing PICC needed for long term IV abx
Patient with bacteremia noted to be hypotensive at time of medication administration. Patient denies symptoms and remains alerted oriented and interacting with staff.

## 2022-02-19 NOTE — DISCHARGE NOTE PROVIDER - NSDCCPCAREPLAN_GEN_ALL_CORE_FT
PRINCIPAL DISCHARGE DIAGNOSIS  Diagnosis: Acute respiratory failure with hypoxia  Assessment and Plan of Treatment: 2/2 to CHF  EXACERBATION AND COVID PNA ,POA - improved      SECONDARY DISCHARGE DIAGNOSES  Diagnosis: Chronic systolic congestive heart failure  Assessment and Plan of Treatment: acute on chronic exacerbation ,poa    Diagnosis: 2019 novel coronavirus disease (COVID-19)  Assessment and Plan of Treatment: stable    Diagnosis: Pneumonia due to virus  Assessment and Plan of Treatment:     Diagnosis: Bacteremia  Assessment and Plan of Treatment: STAPH AUREUS likely from skin source as per ID ,no evidence of endocarditis or discitis .    Diagnosis: CHF with cardiomyopathy  Assessment and Plan of Treatment:     Diagnosis: DERRICK (acute kidney injury)  Assessment and Plan of Treatment:     Diagnosis: Hyponatremia  Assessment and Plan of Treatment:

## 2022-02-19 NOTE — DISCHARGE NOTE PROVIDER - NSDCCAREPROVSEEN_GEN_ALL_CORE_FT
Sylvester, Kelley Zapien, Sanya Muller, Vita Benavidez, Mohsen Schmuter, Charley Dyson, Cesar Frazier, Shila Goins, Vick

## 2022-02-19 NOTE — DISCHARGE NOTE PROVIDER - PROVIDER TOKENS
PROVIDER:[TOKEN:[[id number]],FOLLOWUP:[1 week]],PROVIDER:[TOKEN:[[id number]],FOLLOWUP:[1 week]],PROVIDER:[TOKEN:[[id number]],FOLLOWUP:[2 weeks]],PROVIDER:[TOKEN:[[id number]],FOLLOWUP:[2 weeks]]

## 2022-02-19 NOTE — DISCHARGE NOTE PROVIDER - NSDCMRMEDTOKEN_GEN_ALL_CORE_FT
acetaminophen 325 mg oral tablet: 2 tab(s) orally every 6 hours, As needed, Temp greater or equal to 38C (100.4F), Mild Pain (1 - 3)  amiodarone 200 mg oral tablet: 1 tab(s) orally once a day  apixaban 5 mg oral tablet: 1 tab(s) orally every 12 hours  Aquaphor Healing topical ointment: Apply topically to affected area once a day  ascorbic acid 500 mg oral tablet: 1 tab(s) orally once a day  aspirin 81 mg oral delayed release tablet: 1 tab(s) orally once a day  atorvastatin 40 mg oral tablet: 1 tab(s) orally once a day (at bedtime)  Bacid (LAC) oral capsule: 2 cap(s) orally once a day  bumetanide 2 mg oral tablet: 1 tab(s) orally 2 times a day  ceFAZolin: 2 gram(s) intravenous every 8 hours ,last day 03/26/22  gabapentin 100 mg oral capsule: 1 cap(s) orally 3 times a day  melatonin 3 mg oral tablet: 1 tab(s) orally once a day (at bedtime), As needed, Insomnia  metoprolol succinate 25 mg oral tablet, extended release: 1 tab(s) orally once a day  Multiple Vitamins with Minerals oral tablet: 1 tab(s) orally once a day  pantoprazole 40 mg oral delayed release tablet: 1 tab(s) orally once a day (before a meal)  simethicone 80 mg oral tablet: 2 tab(s) orally every 6 hours, As Needed  spironolactone 25 mg oral tablet: 1 tab(s) orally once a day  Symbicort 160 mcg-4.5 mcg/inh inhalation aerosol: 2 puff(s) inhaled 2 times a day  Ventolin HFA 90 mcg/inh inhalation aerosol: 2 puff(s) inhaled every 6 hours, As Needed   acetaminophen 325 mg oral tablet: 2 tab(s) orally every 6 hours, As needed, Temp greater or equal to 38C (100.4F), Mild Pain (1 - 3)  amiodarone 200 mg oral tablet: 1 tab(s) orally once a day  apixaban 5 mg oral tablet: 1 tab(s) orally every 12 hours  Aquaphor Healing topical ointment: Apply topically to affected area once a day  ascorbic acid 500 mg oral tablet: 1 tab(s) orally once a day  aspirin 81 mg oral delayed release tablet: 1 tab(s) orally once a day  atorvastatin 40 mg oral tablet: 1 tab(s) orally once a day (at bedtime)  Bacid (LAC) oral capsule: 2 cap(s) orally once a day  bumetanide 2 mg oral tablet: 1 tab(s) orally 2 times a day  ceFAZolin: 2 gram(s) intravenous every 8 hours ,last day 03/26/22  gabapentin 100 mg oral capsule: 1 cap(s) orally 3 times a day  melatonin 3 mg oral tablet: 1 tab(s) orally once a day (at bedtime), As needed, Insomnia  metoprolol succinate 25 mg oral tablet, extended release: 1 tab(s) orally once a day  midodrine 5 mg oral tablet: 1 tab(s) orally every 8 hours, As Needed sbp below 90  Multiple Vitamins with Minerals oral tablet: 1 tab(s) orally once a day  pantoprazole 40 mg oral delayed release tablet: 1 tab(s) orally once a day (before a meal)  sacubitril-valsartan 24 mg-26 mg oral tablet: 1 tab(s) orally 2 times a day  simethicone 80 mg oral tablet: 2 tab(s) orally every 6 hours, As Needed  spironolactone 25 mg oral tablet: 1 tab(s) orally once a day  Symbicort 160 mcg-4.5 mcg/inh inhalation aerosol: 2 puff(s) inhaled 2 times a day  Ventolin HFA 90 mcg/inh inhalation aerosol: 2 puff(s) inhaled every 6 hours, As Needed   acetaminophen 325 mg oral tablet: 2 tab(s) orally every 6 hours, As needed, Temp greater or equal to 38C (100.4F), Mild Pain (1 - 3)  amiodarone 200 mg oral tablet: 1 tab(s) orally once a day  apixaban 5 mg oral tablet: 1 tab(s) orally every 12 hours  Aquaphor Healing topical ointment: Apply topically to affected area once a day  ascorbic acid 500 mg oral tablet: 1 tab(s) orally once a day  aspirin 81 mg oral delayed release tablet: 1 tab(s) orally once a day  atorvastatin 40 mg oral tablet: 1 tab(s) orally once a day (at bedtime)  Bacid (LAC) oral capsule: 2 cap(s) orally once a day  bumetanide 2 mg oral tablet: 1 tab(s) orally 2 times a day  ceFAZolin: 2 gram(s) intravenous every 8 hours ,last day 03/26/22  gabapentin 100 mg oral capsule: 1 cap(s) orally 3 times a day  melatonin 3 mg oral tablet: 1 tab(s) orally once a day (at bedtime), As needed, Insomnia  metoprolol succinate 25 mg oral tablet, extended release: 1 tab(s) orally once a day  Multiple Vitamins with Minerals oral tablet: 1 tab(s) orally once a day  pantoprazole 40 mg oral delayed release tablet: 1 tab(s) orally once a day (before a meal)  sacubitril-valsartan 24 mg-26 mg oral tablet: 1 tab(s) orally 2 times a day  simethicone 80 mg oral tablet: 2 tab(s) orally every 6 hours, As Needed  spironolactone 25 mg oral tablet: 1 tab(s) orally once a day  Symbicort 160 mcg-4.5 mcg/inh inhalation aerosol: 2 puff(s) inhaled 2 times a day  Ventolin HFA 90 mcg/inh inhalation aerosol: 2 puff(s) inhaled every 6 hours, As Needed

## 2022-02-19 NOTE — PROGRESS NOTE ADULT - ASSESSMENT
61 yr old male with CAD, dilated cardiomyopathy, s/p ICD, atrial flutter on Eliquis, substance abuse was admitted initially from 12/24 for decompensated CHF, was on Milrinone and Bumex drips, left AMA on 12/31 and returned to the ED later in the day as he had to take care of personal matter. He reported shortness of breath and right arm and foot pain on presentation. Cardiology and Heart Failure specialist were consulted, he was placed on oral Bumex., subsequently transitioned to Bumex gtt. Bumex drip stopped on 1/12. Stopped  Milrinone 1/14 transitioned to oral Torsemide , metoprolol . Counseling given re diet and medication compliance .Not candidate for advanced therapies due to smoking/cocaine use history and non-compliance. Patient reported he was recently evicted from his apartment and is homeless, social work consult placed. Seen by PT and recommend  [facility]. The patient was medically stable for discharge.  Subsequently pt developed COVID-19 infection was admitted to [facility] and discharged a few days ago, now returns to ER from rehab  because of increasing sob and fevers .Found to have positive COVID test ,seen by ID & pulm consult requested Palliative care consult requested ,to discuss advance directives and complete MOLST

## 2022-02-19 NOTE — PROGRESS NOTE ADULT - ASSESSMENT
61 yr old male ,known to me from [facility]  with med hx of  CAD, dilated cardiomyopathy, s/p ICD, atrial flutter on Eliquis, substance abuse was admitted initially from 12/24 for decompensated CHF, was on Milrinone and Bumex drips, left AMA on 12/31 and returned to the ED later in the day as he had to take care of personal matter. He reported shortness of breath and right arm and foot pain on presentation. Cardiology and Heart Failure specialist were consulted, he was placed on oral Bumex., subsequently transitioned to Bumex gtt. Bumex drip stopped on 1/12. Stopped  Milrinone 1/14 transitioned to oral Torsemide , metoprolol . Counseling given re diet and medication compliance .Not candidate for advanced therapies due to smoking/cocaine use history and non-compliance. Patient reported he was recently evicted from his apartment and is homeless, social work consult placed. Seen by PT and recommend  [facility]. The patient was medically stable for discharge.  Subsequently pt developed COVID-19 infection was admitted to [facility] and discharged a few days ago, now returns to ER from rehab  because of increasing sob and fevers .Found to have positive COVID test ,seen by ID & pulm consult requested Palliative care consult requested ,to discuss advance directives and complete MOL  (11 Feb 2022 13:41)      ACUTE RENAL FAILURE:   Serum creatinine is  at 1.6    , approximating GFR decreased .   There is no progression . No uremic symptoms  No evidence of anemia .  Fluid status stable.  Will continue to avoid nephrotoxic drugs.  Patient remains asymptomatic.   Continue current therapy.    BP monitoring,continue current antihypertensive meds, low salt diet,followup with PMD in 1-2 weeks  metoprolol succinate ER 25 milliGRAM(s) Oral daily    Admit for septic workup and ID evaluation,send blood and urine cx,serial lactate levels,monitor vitals closley,ivfs hydration,monitor urine output and renal profile,iv abx as per id cons  ceFAZolin   IVPB 2000 milliGRAM(s) IV Intermittent every 8 hours

## 2022-02-19 NOTE — DISCHARGE NOTE PROVIDER - HOSPITAL COURSE
61 yr old male with CAD, dilated cardiomyopathy, s/p ICD, atrial flutter on Eliquis, substance abuse was admitted initially from 12/24 for decompensated CHF, was on Milrinone and Bumex drips, left AMA on 12/31 and returned to the ED later in the day as he had to take care of personal matter. He reported shortness of breath and right arm and foot pain on presentation. Cardiology and Heart Failure specialist were consulted, he was placed on oral Bumex., subsequently transitioned to Bumex gtt. Bumex drip stopped on 1/12. Stopped  Milrinone 1/14 transitioned to oral Torsemide , metoprolol . Counseling given re diet and medication compliance .Not candidate for advanced therapies due to smoking/cocaine use history and non-compliance. Patient reported he was recently evicted from his apartment and is homeless, social work consult placed. Seen by PT and recommend  [facility]. The patient was medically stable for discharge.  Subsequently pt developed COVID-19 infection was admitted to [facility] and discharged a few days ago, now returns to ER from rehab  because of increasing sob and fevers .Found to have positive COVID test ,seen by ID & pulm consult requested Palliative care consult requested ,to discuss advance directives and complete MOLST     Problem/Plan - 1:  ·  Problem: Acute respiratory failure with hypoxia.   ·  Plan: oxygen supply as per pulm Dr Goins ,serial abgs ,chest xrays ,tele monitoring.    Problem/Plan - 2:  ·  Problem: 2019 novel coronavirus disease (COVID-19).   ·  Plan: Monitor for fevers and hypoxia ,monitor  route of 02 and saturation closely ,trend CBC  AC plan as  per pulmonologist Dr Goins   Critical period for decompensation  (7-14 days post symptom onset), Continue avoid aerosolizing procedures, NSAIDs   -isolation precautions per facility protocol  -Try to avoid excessive blood draws, blood cultures and frequent CXRs  -monitor biomarkers- CBC w diff  for NLR <3 low vs >5 high) Ferritin (lower risk <450 vs >850) CRP (low risk <2 and higher risk >6) and LDH, D Dimer, procalcitonin serum levels   -antibiotics only if superimosed  bacterial process is suspected   -Steroids short course (usually  5 days)  --for hypoxia/ARDS /shock.    Problem/Plan - 3:  ·  Problem: Bacteremia.   ·  Plan: Staph Aureus bacteremia  Source of bacteremia unclear   Prob skin source- pt c/o mild tenderness at old IV sites on left arm though no appreciable induration erythema or palpable cordSp Vanc 1 gram x 1 dose  Start Ancef  Fu cultures  Repeat blood cultures.    Problem/Plan - 4:  ·  Problem: Chronic systolic congestive heart failure.   ·  Plan: Admit to monitored unit for cardiac monitoring, obtain echo to evaluate LVEF, intravenous diuresis as per card consult , monitor ins/outs, monitor renal profile and electrolytes closely ,send 3 sets of enzymes, O2 supply, serial chest xrays, monitor weights and oral intake of fluids, nutritionist consult.    Problem/Plan - 5:  ·  Problem: CHF with cardiomyopathy.   ·  Plan: Admitted  to telemetry unit for monitoring , send 3 sets of cardiac enzymes to rule out acute coronary event, obtain ECHO to evaluate LVEF, cardiology consult  ,continue current management, O2 supply, anticoagulation plan as per cardiology consult.    Problem/Plan - 6:  ·  Problem: DERRICK (acute kidney injury).   ·  Plan: Admit for iv hydration,monitor renal profile and urine output,nutritionist consult,prealbumin level,serial bmp,nutritional supplements,multivitamins,palliative care evaluuation regarding MOLST completion and artificial nutrition discussion.    Problem/Plan - 7:  ·  Problem: Hyponatremia.   ·  Plan: serial bmp ,ins/outs ,nephrology consult f/up.    Problem/Plan - 8:  ·  Problem: Prophylactic measure.   ·  Plan: Gastrointestinal stress ulcer prophylaxis and DVT prophylaxis administered.

## 2022-02-20 NOTE — PROGRESS NOTE ADULT - ASSESSMENT
61 yr old male with CAD, dilated cardiomyopathy, s/p ICD, atrial flutter on Eliquis, substance abuse was admitted initially from 12/24 for decompensated CHF, was on Milrinone and Bumex drips, left AMA on 12/31 and returned to the ED later in the day as he had to take care of personal matter. He reported shortness of breath and right arm and foot pain on presentation. Cardiology and Heart Failure specialist were consulted, he was placed on oral Bumex., subsequently transitioned to Bumex gtt. Bumex drip stopped on 1/12. Stopped  Milrinone 1/14 transitioned to oral Torsemide , metoprolol . Counseling given re diet and medication compliance .Not candidate for advanced therapies due to smoking/cocaine use history and non-compliance. Patient reported he was recently evicted from his apartment and is homeless, social work consult placed. Seen by PT and recommend  [facility]. The patient was medically stable for discharge.  Subsequently pt developed COVID-19 infection was admitted to [facility] and discharged a few days ago, now returns to ER because of increasing sob and fevers.    remains COVID positive  Hypoxemia - resp distress  CKD -   Cardiomyopathy - Systolic CHF  Non - Domicile  AF hx  Substance Use Disorder    PICC inserted  BUMEX added - Diuresis -   Albuterol - Robitussin Added -   UA noted - Blood Cx noted - Covid PCR neg  Emtresto added - Aldactone added -   on o2 support  on ABX - Ancef  Blood cx noted  VS noted  LABS reviewed    old records reviewed  tox screen reviewed  pt is homeless at present - no immediate family  uses [facility] - [facility] - Smoker - Opioids   remains Covid Positive  advanced Heart disease and end stage CHF  recent hospitalization at [facility]  fio2 support  counseling  emotional support  SW involvement  SBIRT  assist with needs  prognosis guarded

## 2022-02-20 NOTE — PROGRESS NOTE ADULT - ASSESSMENT
61 yr old male ,known to me from [facility]  with med hx of  CAD, dilated cardiomyopathy, s/p ICD, atrial flutter on Eliquis, substance abuse was admitted initially from 12/24 for decompensated CHF, was on Milrinone and Bumex drips, left AMA on 12/31 and returned to the ED later in the day as he had to take care of personal matter. He reported shortness of breath and right arm and foot pain on presentation. Cardiology and Heart Failure specialist were consulted, he was placed on oral Bumex., subsequently transitioned to Bumex gtt. Bumex drip stopped on 1/12. Stopped  Milrinone 1/14 transitioned to oral Torsemide , metoprolol . Counseling given re diet and medication compliance .Not candidate for advanced therapies due to smoking/cocaine use history and non-compliance. Patient reported he was recently evicted from his apartment and is homeless, social work consult placed. Seen by PT and recommend  [facility]. The patient was medically stable for discharge.  Subsequently pt developed COVID-19 infection was admitted to [facility] and discharged a few days ago, now returns to ER from rehab  because of increasing sob and fevers .Found to have positive COVID test ,seen by ID & pulm consult requested Palliative care consult requested ,to discuss advance directives and complete MOL  (11 Feb 2022 13:41)      ACUTE RENAL FAILURE:   Serum creatinine is  at 1.6    , approximating GFR decreased .   There is no progression . No uremic symptoms  No evidence of anemia .  Fluid status stable.  Will continue to avoid nephrotoxic drugs.  Patient remains asymptomatic.   Continue current therapy.    BP monitoring,continue current antihypertensive meds, low salt diet,followup with PMD in 1-2 weeks  metoprolol succinate ER 25 milliGRAM(s) Oral daily    Admit for septic workup and ID evaluation,send blood and urine cx,serial lactate levels,monitor vitals closley,ivfs hydration,monitor urine output and renal profile,iv abx as per id cons  ceFAZolin   IVPB 2000 milliGRAM(s) IV Intermittent every 8 hours

## 2022-02-20 NOTE — PROGRESS NOTE ADULT - ASSESSMENT
BIPIN DE LA CRUZ 61 m 2/11/2022 1960 DR KELVIN VANESSA     REVIEW OF SYMPTOMS      Able to give (reliable) ROS  NO     PHYSICAL EXAM    HEENT Unremarkable  atraumatic   RESP Fair air entry EXP prolonged    Harsh breath sound Resp distres mild   CARDIAC S1 S2 No S3     NO JVD    ABDOMEN SOFT BS PRESENT NOT DISTENDED No hepatosplenomegaly   PEDAL EDEMA present No calf tenderness  NO rash       DOA/CC/PROBLEMS poa .  2/11/2022 61 m as per EMS, SOB (D/C'D from [facility] 2/1 for covid)  shortness of breath    PROBLEMS.    Resp failure poa 2/11/2022  HFNC 50% on 2/11/2022 2/16/2022 4l   2/17 32%  742/53/85   r Pl effs 2/11 cxr   COPD pmh    COVID (+) 2/11/2022   AICD  S aureus bacteremia mssa 2/11  Ancef 2/12/2022 Dr Phan    Lacticemia poa 2/11/2022 la 2.5   AOC CHF poa 2/11/2022 bnp 00928  pmh Dilated cmpthy  ECHO 11/20/2021 ef 20%  A fib (on eliquis) pmh   2/11/2022 apixaba 5.2     Hyponatremia poa 2/11/2022 Na 129-133  Hyperkalemia poa 2/11-2/12/2022 K 5.7-5  DERRICK poa 2/11-2/12/2022 Cr 1.6-1.6      MISC ISSUES.                       COVID  STATUS.   scv2 2/11/2022 (+)         ICU STAY. none  GOC.  2/13/2022 full code     BEST PRACTICE ISSUES.                                                  HEAD OF BED ELEVATION. Yes  DVT PROPHYLAXIS.  2/11/2022 apixaba 5.2  a fib     WHITMORE PROPHYLAXIS.     2/11/2022 protonix 40                                                                                    DIET.   soft bite 1.2 l fr               ASSESSMENT/RECOMMENDATIONS.    HEMODYNAMICS.   Monitor bp Target MAP 65 (+)      PMH/PSH issues  were addressed.  Management continued/adjusted as indicated         RESP.   Monitor po Target po 90-95%    ABG.   2/11 50% hfnc 746/34/78    OXYGEN REQUIREMENTS.  2/20/2022 3l 93%     COVID.  Pt has been COVID (+) since 1/23 2/11/2022 Dexa 6x 10 d  (Dr Dyson)       RESP FAILURE.  pt has hypoxemic failure   2/11/2022 Pt has been COVID (+) since 1/23 so ID does not think resp failure is sec to covid     COPD.  2/11/2022 pulmicort .5x2 DCed 2/14/2022        PROSTH/TUBES/LINES.  poa AICD     INFECTION.  Staph aureus bacteremia 2/11  W 2/11-2/12-2/13-2/15/2022 w 17-15 - 13 - 9.7   pr 2/12/2022 24  ECHO 2/13/2022 no endocarditis  cxr 2/11/2022 cxr nsc 1/14/2022   nsc mod r pl effsn or basal airspace disease   cm  Flu AB 2/11/2022 (-)   RSV 2/11/2022 (-)  blod c 2/14 (-)  blod c 2/12 staph a   blod c 2/11 mssa   abio 2/12/2022 ancef 1.3 Dr Garcia sa 6 w 3/26    CARDIAC.  HEMODYNAMICS.  Lacticemia poa 2/11/2022   la 2/11/2022 la 2.  AOC CHF poa 2/11/2022  cr 2/16/2022 cr 1.1   ECHO 2/13/2022 no endocarditis severe lvsd mod mr mild ph pa 42   ECHO 11/20/2021 ef 20% deidra rve all severely enlargd pasp 37 mod to severe mr   bnp 2/11-2/12/2022 bnp [id number] - [id number]   cxr 2/11/2022 cxr nsc 1/14/2022   nsc mod r pl effsn or basal airspace disease   cm  2/11/2022 bumetanide 2.2 iv   2/14/2022 sacubitril 24 valsartan 25 bid   2/14/2022 spironolact 25   A fib   2/11/2022 apixaba 5.2  2/11/2022 amiodarine  200   2/12/2022 metoprolol 25   CAD  2/11/2022 ekg nsr t wave abd consider lateral ischemia   2/11/2022 ASA 81   2/11/2022 atorvastat 40  A/R  Rx CHF   monitor k   Cont amiodarone apixaban   Cardio on case       DERRICK   Cr 2/11-2/12-2/13-2/14-2/15/2022 Cr 1.6 - 1.6- 1.5-1.4- 1.2   monitor     Elevated LFTS   LFTS 2/11-2/12/2022   -293  AST 55-34  ALT 53 -42  Likely sec to chf      TIME SPENT   Over 25 minutes aggregate care time spent on encounter; activities included   direct patient care, counseling and/or coordinating care reviewing notes, lab data/ imaging , discussion with multidisciplinary team/ patient  /family and explaining in detail risks, benefits, alternatives  of the recommendations     BIPIN DE LA CRUZ 61 m 2/11/2022 1960 DR KELVIN VANESSA

## 2022-02-20 NOTE — PROVIDER CONTACT NOTE (MEDICATION) - SITUATION
Patient scheduled for antihypertensive medications noted to be HYPOtensive at time of medication administration. MD Ferro at patient bedside earlier in the day and made aware of patient vital signs.

## 2022-02-20 NOTE — CHART NOTE - NSCHARTNOTEFT_GEN_A_CORE
Called by RN for hypotension. Patient noted to have previous episodes of hypotension with low MAP requiring midodrine.   At present, BP 80/57, with a MAP of 65. Pt remains asymptomatic.   - Echo 11/21 with severely reduced LV and RV function, mod/severe MR, mod TR.   - Currently on Bumex BID for diuresis, entresto, toprol, and aldactone however also hasn't been getting doses for past several days due to hold parameters.   - Cardiology following   - Given severe cardiomyopathy will avoid IVF  - Ordered 5mg midodrine x1 STAT  - Will reassess Called by RN for hypotension. Patient noted to have previous episodes of hypotension with low MAP requiring midodrine.   At present, BP 80/57, with a MAP of 65. Pt remains asymptomatic.   - Echo 11/21 with severely reduced LV and RV function, mod/severe MR, mod TR.   - BP likely low due to severe heart failure.   - Currently on Bumex BID for diuresis, entresto, toprol, and aldactone however also hasn't been getting doses for past several days due to hold parameters.   - Cardiology following. Aware of low BP. Planning to change parameters if BP remains low.   - Given severe cardiomyopathy will avoid IVF  - Ordered 5mg midodrine x1 STAT  - Will reassess

## 2022-02-20 NOTE — PROGRESS NOTE ADULT - ASSESSMENT
61 year old male with a history of CAD, end stage chronic systolic heart failure s/p ICD, atrial flutter s/p DCCV, substance abuse, CKD COVID 19 1/23/22 admitted with Acute hypoxic respiratory failure likely sec CHF exacerbation Markedly elevated BNP. On HFNC with Staph Aureus bacteremia  Source of bacteremia unclear   Prob skin source- pt c/o mild tenderness at old IV sites on left arm though no appreciable induration erythema or palpable cord  Has AICD Echo neg for endocarditis  C/o back pain  Had gallium scan ? cervical discitis  Unable to do MRI due to AICD  COVID 19- pt had been positive since 1/23/22.  Pt is vaccinated against COVID 19.    Plan :  Cont Ancef  2grams q8 x 6 weeks 3/26/22  PICC line  Fu repeat blood cultures- NGTD  Off Decadron   Diurese per cardiology  Trend temps and cbc    Infectious Diseases will continue to follow. Please call with any questions.   Shila Frazier M.D.  [facility], Division of Infectious Diseases [phone] 61 year old male with a history of CAD, end stage chronic systolic heart failure s/p ICD, atrial flutter s/p DCCV, substance abuse, CKD COVID 19 1/23/22 admitted with Acute hypoxic respiratory failure likely sec CHF exacerbation Markedly elevated BNP. On HFNC with Staph Aureus bacteremia  Source of bacteremia unclear   Prob skin source- pt c/o mild tenderness at old IV sites on left arm though no appreciable induration erythema or palpable cord  Has AICD Echo neg for endocarditis  C/o back pain  Had gallium scan ? cervical discitis  Unable to do MRI due to AICD  COVID 19- pt had been positive since 1/23/22.  Pt is vaccinated against COVID 19.    Plan :  Cont Ancef  2grams q8 x 6 weeks 3/26/22  PICC line placed 2/18  Fu repeat blood cultures- NGTD  Off Decadron   Diurese per cardiology  Trend temps and cbc    Infectious Diseases will continue to follow. Please call with any questions.   Shila Frazier M.D.  [facility] [address], Division of Infectious Diseases [phone]

## 2022-02-20 NOTE — PROGRESS NOTE ADULT - ASSESSMENT
The patient is a 61 year old male with a history of CAD, chronic systolic heart failure s/p ICD, atrial flutter s/p DCCV, substance abuse, CKD who is admitted with acute on chronic systolic heart failure, COVID.    Plan:  - Echo 11/21 with severely reduced LV and RV function, mod/severe MR, mod TR  - Continue metoprolol succinate 25 mg daily  - Continue spironolactone 25 mg daily  - Continue Entresto 24-26 mg bid  - Continue bumetanide 2 mg PO bid  - Continue amiodarone 200 mg daily  - Continue apixaban 5 mg bid  - Continue aspirin 81 mg daily  - Continue atorvastatin 40 mg daily  - COVID-19 positive  - On dexamethasone  - Staph bacteremia - on cefazolin. TTE with no obvious endocarditis. PICC placed.  - Low BPs expected in this patient with severe heart failure on heart failure medications. AM medications held, if BP remains the same, will change holding parameters to systolic of 90.  - Discharge planning

## 2022-02-21 NOTE — PROGRESS NOTE ADULT - ASSESSMENT
The patient is a 61 year old male with a history of CAD, chronic systolic heart failure s/p ICD, atrial flutter s/p DCCV, substance abuse, CKD who is admitted with acute on chronic systolic heart failure, COVID.    Plan:  - Echo 11/21 with severely reduced LV and RV function, mod/severe MR, mod TR  - Continue metoprolol succinate 25 mg daily  - Continue spironolactone 25 mg daily  - Hold Entresto due to low BPs - resume as outpatient  - Continue bumetanide 2 mg PO bid  - Continue amiodarone 200 mg daily  - Continue apixaban 5 mg bid  - Continue aspirin 81 mg daily  - Continue atorvastatin 40 mg daily  - COVID-19 positive  - On dexamethasone  - Staph bacteremia - on cefazolin. TTE with no obvious endocarditis. PICC placed.  - Discharge planning

## 2022-02-21 NOTE — PROGRESS NOTE ADULT - ASSESSMENT
61 yr old male with CAD, dilated cardiomyopathy, s/p ICD, atrial flutter on Eliquis, substance abuse was admitted initially from 12/24 for decompensated CHF, was on Milrinone and Bumex drips, left AMA on 12/31 and returned to the ED later in the day as he had to take care of personal matter. He reported shortness of breath and right arm and foot pain on presentation. Cardiology and Heart Failure specialist were consulted, he was placed on oral Bumex., subsequently transitioned to Bumex gtt. Bumex drip stopped on 1/12. Stopped  Milrinone 1/14 transitioned to oral Torsemide , metoprolol . Counseling given re diet and medication compliance .Not candidate for advanced therapies due to smoking/cocaine use history and non-compliance. Patient reported he was recently evicted from his apartment and is homeless, social work consult placed. Seen by PT and recommend  [facility]. The patient was medically stable for discharge.  Subsequently pt developed COVID-19 infection was admitted to [facility] and discharged a few days ago, now returns to ER because of increasing sob and fevers.    remains COVID positive  Hypoxemia - resp distress  CKD -   Cardiomyopathy - Systolic CHF  Non - Domicile  AF hx  Substance Use Disorder    overnight events noted - vs noted - labs reviewed - Midodrine dosing   remains on ABX  Bumex Diuresis with Hold Parameters    old records reviewed  tox screen reviewed  pt is homeless at present - no immediate family  uses [facility] - [facility] - Smoker - Opioids   advanced Heart disease and end stage CHF  recent hospitalization at [facility]  fio2 support  counseling  emotional support  SW involvement  SBIRT  assist with needs  prognosis guarded

## 2022-02-21 NOTE — PROGRESS NOTE ADULT - ASSESSMENT
61 year old male with a history of CAD, end stage chronic systolic heart failure s/p ICD, atrial flutter s/p DCCV, substance abuse, CKD COVID 19 1/23/22 admitted with Acute hypoxic respiratory failure likely sec CHF exacerbation Markedly elevated BNP. S/p HFNC.   Staph Aureus bacteremia -source of bacteremia unclear   Prob skin source- pt c/o mild tenderness at old IV sites on left arm though no appreciable induration erythema or palpable cord  Has AICD Echo neg for endocarditis  C/o back pain  Had gallium scan ? cervical discitis  Unable to do MRI due to AICD  COVID 19- pt had been positive since 1/23/22.  Pt is vaccinated against COVID 19.  COVID negative as 2/14    Plan :  2/14 Repeat blood cultures negative  Cont Ancef  2grams q8 x 6 weeks 3/26/22  S/p RUE PICC line 2/18  Off Decadron   Diurese per cardiology  Trend temps and cbc      Indu Rivas M.D.  [facility], Division of Infectious Diseases  [phone]  After 5pm on weekdays and all day on weekends - please call [phone]

## 2022-02-21 NOTE — PROGRESS NOTE ADULT - ASSESSMENT
61 yr old male ,known to me from [facility]  with med hx of  CAD, dilated cardiomyopathy, s/p ICD, atrial flutter on Eliquis, substance abuse was admitted initially from 12/24 for decompensated CHF, was on Milrinone and Bumex drips, left AMA on 12/31 and returned to the ED later in the day as he had to take care of personal matter. He reported shortness of breath and right arm and foot pain on presentation. Cardiology and Heart Failure specialist were consulted, he was placed on oral Bumex., subsequently transitioned to Bumex gtt. Bumex drip stopped on 1/12. Stopped  Milrinone 1/14 transitioned to oral Torsemide , metoprolol . Counseling given re diet and medication compliance .Not candidate for advanced therapies due to smoking/cocaine use history and non-compliance. Patient reported he was recently evicted from his apartment and is homeless, social work consult placed. Seen by PT and recommend  [facility]. The patient was medically stable for discharge.  Subsequently pt developed COVID-19 infection was admitted to [facility] and discharged a few days ago, now returns to ER from rehab  because of increasing sob and fevers .Found to have positive COVID test ,seen by ID & pulm consult requested Palliative care consult requested ,to discuss advance directives and complete MOL  (11 Feb 2022 13:41)      ACUTE RENAL FAILURE:   Serum creatinine is  at 1.6    , approximating GFR decreased .   There is no progression . No uremic symptoms  No evidence of anemia .  Fluid status stable.  Will continue to avoid nephrotoxic drugs.  Patient remains asymptomatic.   Continue current therapy.    BP monitoring,continue current antihypertensive meds, low salt diet,followup with PMD in 1-2 weeks  metoprolol succinate ER 25 milliGRAM(s) Oral daily    Admit for septic workup and ID evaluation,send blood and urine cx,serial lactate levels,monitor vitals closley,ivfs hydration,monitor urine output and renal profile,iv abx as per id cons  ceFAZolin   IVPB 2000 milliGRAM(s) IV Intermittent every 8 hours

## 2022-02-21 NOTE — PROGRESS NOTE ADULT - ASSESSMENT
BIPIN DE LA CRUZ 61 m 2/11/2022 1960 DR KELVIN VANESSA     REVIEW OF SYMPTOMS      Able to give (reliable) ROS  NO     PHYSICAL EXAM    HEENT Unremarkable  atraumatic   RESP Fair air entry EXP prolonged    Harsh breath sound Resp distres mild   CARDIAC S1 S2 No S3     NO JVD    ABDOMEN SOFT BS PRESENT NOT DISTENDED No hepatosplenomegaly   PEDAL EDEMA present No calf tenderness  NO rash     DOA/CC/PROBLEMS poa .  2/11/2022 61 m as per EMS, SOB (D/C'D from [facility] 2/1 for covid)  shortness of breath    PROBLEMS.    Resp failure poa 2/11/2022  HFNC 50% on 2/11/2022 2/16/2022 4l   2/17 32%  742/53/85   r Pl effs 2/11 cxr   COPD pmh    COVID (+) 2/11/2022   AICD  S aureus bacteremia mssa 2/11  Ancef 2/12/2022 Dr Phan    Lacticemia poa 2/11/2022 la 2.5   AOC CHF poa 2/11/2022 bnp 20555  pmh Dilated cmpthy  ECHO 11/20/2021 ef 20%  A fib (on eliquis) pmh   2/11/2022 apixaba 5.2     MISC ISSUES.                       COVID  STATUS.   scv2 2/11/2022 (+)         ICU STAY. none  GOC.  2/13/2022 full code     BEST PRACTICE ISSUES.                                                  HEAD OF BED ELEVATION. Yes  DVT PROPHYLAXIS.  2/11/2022 apixaba 5.2  a fib     WHITMORE PROPHYLAXIS.     2/11/2022 protonix 40                                                                                    DIET.   soft bite 1.2 l fr               ASSESSMENT/RECOMMENDATIONS.    HEMODYNAMICS.   Monitor bp Target MAP 65 (+)      PMH/PSH issues  were addressed.  Management continued/adjusted as indicated         RESP.   Monitor po Target po 90-95%    ABG.   2/11 50% hfnc 746/34/78    OXYGEN REQUIREMENTS.  2/21/2022 3l 91%    COVID.  Pt has been COVID (+) since 1/23 2/11/2022 Dexa 6x 10 d  (Dr Dyson)       RESP FAILURE.  pt has hypoxemic failure   2/11/2022 Pt has been COVID (+) since 1/23 so ID does not think resp failure is sec to covid     COPD.  2/11/2022 pulmicort .5x2 DCed 2/14/2022      PROSTH/TUBES/LINES.  poa AICD     INFECTION.  Staph aureus bacteremia 2/11  T 2/21/2022 T 100f   W 2/11-2/12-2/13-2/15/2022 w 17-15 - 13 - 9.7   pr 2/12/2022 24  ECHO 2/13/2022 no endocarditis  cxr 2/11/2022 cxr nsc 1/14/2022   nsc mod r pl effsn or basal airspace disease   cm  Flu AB 2/11/2022 (-)   RSV 2/11/2022 (-)  blod c 2/14 (-)  blod c 2/12 staph a   blod c 2/11 mssa   abio 2/12/2022 ancef 1.3 Dr Jose hernandez 6 w 3/26    CARDIAC.  HEMODYNAMICS.  Lacticemia poa 2/11/2022   la 2/11/2022 la 2.  AOC CHF poa 2/11/2022  cr 2/16/2022 cr 1.1   ECHO 2/13/2022 no endocarditis severe lvsd mod mr mild ph pa 42   ECHO 11/20/2021 ef 20% deidra rve all severely enlargd pasp 37 mod to severe mr   bnp 2/11-2/12/2022 bnp 51535 - 05197   cxr 2/11/2022 cxr nsc 1/14/2022   nsc mod r pl effsn or basal airspace disease   cm  2/11/2022 bumetanide 2.2 iv   2/14/2022 sacubitril 24 valsartan 25 bid   2/14/2022 spironolact 25   A fib   2/11/2022 apixaba 5.2  2/11/2022 amiodarine  200   2/12/2022 metoprolol 25   CAD  2/11/2022 ekg nsr t wave abd consider lateral ischemia   2/11/2022 ASA 81   2/11/2022 atorvastat 40  A/R  Rx CHF   monitor k   Cont amiodarone apixaban   Cardio on case     TIME SPENT   Over 25 minutes aggregate care time spent on encounter; activities included   direct patient care, counseling and/or coordinating care reviewing notes, lab data/ imaging , discussion with multidisciplinary team/ patient  /family and explaining in detail risks, benefits, alternatives  of the recommendations     BIPIN DE LA CRUZ 61 m 2/11/2022 1960 DR KELVIN VANESSA

## 2022-02-22 NOTE — PROGRESS NOTE ADULT - ASSESSMENT
61 yr old male with CAD, dilated cardiomyopathy, s/p ICD, atrial flutter on Eliquis, substance abuse was admitted initially from 12/24 for decompensated CHF, was on Milrinone and Bumex drips, left AMA on 12/31 and returned to the ED later in the day as he had to take care of personal matter. He reported shortness of breath and right arm and foot pain on presentation. Cardiology and Heart Failure specialist were consulted, he was placed on oral Bumex., subsequently transitioned to Bumex gtt. Bumex drip stopped on 1/12. Stopped  Milrinone 1/14 transitioned to oral Torsemide , metoprolol . Counseling given re diet and medication compliance .Not candidate for advanced therapies due to smoking/cocaine use history and non-compliance. Patient reported he was recently evicted from his apartment and is homeless, social work consult placed. Seen by PT and recommend  [facility]. The patient was medically stable for discharge.  Subsequently pt developed COVID-19 infection was admitted to [facility] and discharged a few days ago, now returns to ER from rehab  because of increasing sob and fevers .Found to have positive COVID test ,seen by ID & pulm consult requested Palliative care consult requested ,to discuss advance directives and complete MOLST  Cont Ancef 2grams q8 x 6 weeks 3/26/22  S/p RUE PICC line 2/18  Off Decadron   Diurese per cardiology  Trend temps and cbc  Monitor fever curve. If fever >101F, will have to repeat cx

## 2022-02-22 NOTE — PROGRESS NOTE ADULT - ASSESSMENT
61 yr old male with CAD, dilated cardiomyopathy, s/p ICD, atrial flutter on Eliquis, substance abuse was admitted initially from 12/24 for decompensated CHF, was on Milrinone and Bumex drips, left AMA on 12/31 and returned to the ED later in the day as he had to take care of personal matter. He reported shortness of breath and right arm and foot pain on presentation. Cardiology and Heart Failure specialist were consulted, he was placed on oral Bumex., subsequently transitioned to Bumex gtt. Bumex drip stopped on 1/12. Stopped  Milrinone 1/14 transitioned to oral Torsemide , metoprolol . Counseling given re diet and medication compliance .Not candidate for advanced therapies due to smoking/cocaine use history and non-compliance. Patient reported he was recently evicted from his apartment and is homeless, social work consult placed. Seen by PT and recommend  [facility]. The patient was medically stable for discharge.  Subsequently pt developed COVID-19 infection was admitted to [facility] and discharged a few days ago, now returns to ER because of increasing sob and fevers.    remains COVID positive  Hypoxemia - resp distress  CKD -   Cardiomyopathy - Systolic CHF  Non - Domicile  AF hx  Substance Use Disorder    overnight events noted - vs noted - labs reviewed - Midodrine dosing   remains on ABX - Febrile overnight -   Bumex Diuresis with Hold Parameters    old records reviewed  tox screen reviewed  pt is homeless at present - no immediate family  uses [facility] - [facility] - Smoker - Opioids   advanced Heart disease and end stage CHF  recent hospitalization at [facility]  fio2 support  counseling  emotional support  SW involvement  SBIRT  assist with needs  prognosis guarded

## 2022-02-22 NOTE — CHART NOTE - NSCHARTNOTEFT_GEN_A_CORE
Called by RN for fever T 100.5F. Chart reviewed at length; Tmax 100.7F earlier in shift.   - Currently being treated with Ancef for staph aureus bacteremia without clear source; due to complete course 3/26  - Given new fever, will repeat blood cultures x2, UA, CBC w/ diff, cmp  - Further workup pending results of above; if unrevealing, further evaluation per primary team  - RN to call with any changes

## 2022-02-22 NOTE — PROGRESS NOTE ADULT - ASSESSMENT
61 year old male with a history of CAD, end stage chronic systolic heart failure s/p ICD, atrial flutter s/p DCCV, substance abuse, CKD COVID 19 1/23/22 admitted with Acute hypoxic respiratory failure likely sec CHF exacerbation Markedly elevated BNP. S/p HFNC.   Staph Aureus bacteremia -source of bacteremia unclear   Prob skin source- pt c/o mild tenderness at old IV sites on left arm though no appreciable induration erythema or palpable cord  Has AICD Echo neg for endocarditis  C/o back pain  Had gallium scan ? cervical discitis  Unable to do MRI due to AICD  COVID 19- pt had been positive since 1/23/22.  Pt is vaccinated against COVID 19.  COVID negative as 2/14 2/22 febrile.     Plan :  2/14 Repeat blood cultures negative  Cont Ancef 2grams q8 x 6 weeks 3/26/22  S/p RUE PICC line 2/18  Off Decadron   Diurese per cardiology  Trend temps and cbc    Monitor fever curve. If fever >101F, will have to repeat cx    Infectious Diseases will continue to follow. Please call with any questions.   Shila Frazier M.D.  [facility], Division of Infectious Diseases [phone]

## 2022-02-22 NOTE — PROGRESS NOTE ADULT - ASSESSMENT
61 yr old male ,known to me from [facility]  with med hx of  CAD, dilated cardiomyopathy, s/p ICD, atrial flutter on Eliquis, substance abuse was admitted initially from 12/24 for decompensated CHF, was on Milrinone and Bumex drips, left AMA on 12/31 and returned to the ED later in the day as he had to take care of personal matter. He reported shortness of breath and right arm and foot pain on presentation. Cardiology and Heart Failure specialist were consulted, he was placed on oral Bumex., subsequently transitioned to Bumex gtt. Bumex drip stopped on 1/12. Stopped  Milrinone 1/14 transitioned to oral Torsemide , metoprolol . Counseling given re diet and medication compliance .Not candidate for advanced therapies due to smoking/cocaine use history and non-compliance. Patient reported he was recently evicted from his apartment and is homeless, social work consult placed. Seen by PT and recommend  [facility]. The patient was medically stable for discharge.  Subsequently pt developed COVID-19 infection was admitted to [facility] and discharged a few days ago, now returns to ER from rehab  because of increasing sob and fevers .Found to have positive COVID test ,seen by ID & pulm consult requested Palliative care consult requested ,to discuss advance directives and complete MOL  (11 Feb 2022 13:41)      ACUTE RENAL FAILURE:   Serum creatinine is  at 1.6    , approximating GFR decreased .   There is no progression . No uremic symptoms  No evidence of anemia .  Fluid status stable.  Will continue to avoid nephrotoxic drugs.  Patient remains asymptomatic.   Continue current therapy.    BP monitoring,continue current antihypertensive meds, low salt diet,followup with PMD in 1-2 weeks  metoprolol succinate ER 25 milliGRAM(s) Oral daily    Admit for septic workup and ID evaluation,send blood and urine cx,serial lactate levels,monitor vitals closley,ivfs hydration,monitor urine output and renal profile,iv abx as per id cons  ceFAZolin   IVPB 2000 milliGRAM(s) IV Intermittent every 8 hours

## 2022-02-22 NOTE — PROGRESS NOTE ADULT - ASSESSMENT
The patient is a 61 year old male with a history of CAD, chronic systolic heart failure s/p ICD, atrial flutter s/p DCCV, substance abuse, CKD who is admitted with acute on chronic systolic heart failure, COVID.    Plan:  - Echo 11/21 with severely reduced LV and RV function, mod/severe MR, mod TR  - Continue metoprolol succinate 25 mg daily  - Continue spironolactone 25 mg daily  - Hold Entresto due to low BPs - resume as outpatient  - Continue bumetanide 2 mg PO bid  - Continue amiodarone 200 mg daily  - Continue apixaban 5 mg bid  - Continue aspirin 81 mg daily  - Continue atorvastatin 40 mg daily  - Staph bacteremia - on cefazolin. TTE with no obvious endocarditis. PICC placed. Recurrent fevers - if VANESSA needed for further work-up, recommend transfer to [facility], where the patient is well known to their cardiology service.

## 2022-02-22 NOTE — PROGRESS NOTE ADULT - ASSESSMENT
BIPIN DE LA CRUZ 61 m 2/11/2022 1960 DR KELVIN VANESSA     REVIEW OF SYMPTOMS      Able to give (reliable) ROS  NO     PHYSICAL EXAM    HEENT Unremarkable  atraumatic   RESP Fair air entry EXP prolonged    Harsh breath sound Resp distres mild   CARDIAC S1 S2 No S3     NO JVD    ABDOMEN SOFT BS PRESENT NOT DISTENDED No hepatosplenomegaly   PEDAL EDEMA present No calf tenderness  NO rash       DOA/CC/PROBLEMS poa .  2/11/2022 61 m as per EMS, SOB (D/C'D from [facility] 2/1 for covid)  shortness of breath    PROBLEMS.    Resp failure poa 2/11/2022  HFNC 50% on 2/11/2022 2/16/2022 4l   2/17 32%  742/53/85   r Pl effs 2/11 cxr   COPD pmh    COVID (+) 2/11/2022   AICD  S aureus bacteremia mssa 2/11  Ancef 2/12/2022 Dr Phan    Lacticemia poa 2/11/2022 la 2.5   AOC CHF poa 2/11/2022 bnp 20423  pmh Dilated cmpthy  ECHO 11/20/2021 ef 20%  A fib (on eliquis) pmh   2/11/2022 apixaba 5.2     MISC ISSUES.                       COVID  STATUS.   scv2 2/11/2022 (+)         ICU STAY. none  GOC.  2/13/2022 full code     BEST PRACTICE ISSUES.                                                  HEAD OF BED ELEVATION. Yes  DVT PROPHYLAXIS.  2/11/2022 apixaba 5.2  a fib     WHITMORE PROPHYLAXIS.     2/11/2022 protonix 40                                                                                    DIET.   soft bite 1.2 l fr               ASSESSMENT/RECOMMENDATIONS.    HEMODYNAMICS.   Monitor bp Target MAP 65 (+)      PMH/PSH issues  were addressed.  Management continued/adjusted as indicated         RESP.   Monitor po Target po 90-95%    ABG.   2/11 50% hfnc 746/34/78    OXYGEN REQUIREMENTS.  2/21/2022 3l 91%    COVID.  Pt has been COVID (+) since 1/23 2/11/2022 Dexa 6x 10 d  (Dr Dyson)       RESP FAILURE.  pt has hypoxemic failure   2/11/2022 Pt has been COVID (+) since 1/23 so ID does not think resp failure is sec to covid     COPD.  2/11/2022 pulmicort .5x2 DCed 2/14/2022        PROSTH/TUBES/LINES.  poa AICD     INFECTION.  Staph aureus bacteremia 2/11  T 2/21/2022 T 100f   W 2/11-2/12-2/13-2/15/2022 w 17-15 - 13 - 9.7   pr 2/12/2022 24  ECHO 2/13/2022 no endocarditis  cxr 2/11/2022 cxr nsc 1/14/2022   nsc mod r pl effsn or basal airspace disease   cm  Flu AB 2/11/2022 (-)   RSV 2/11/2022 (-)  blod c 2/14 (-)  blod c 2/12 staph a   blod c 2/11 mssa   abio 2/12/2022 ancef 1.3 Dr Jose hernandez 6 w 3/26    CARDIAC.  HEMODYNAMICS.  Lacticemia poa 2/11/2022   la 2/11/2022 la 2.  AOC CHF poa 2/11/2022  cr 2/16/2022 cr 1.1   ECHO 2/13/2022 no endocarditis severe lvsd mod mr mild ph pa 42   ECHO 11/20/2021 ef 20% deidra rve all severely enlargd pasp 37 mod to severe mr   bnp 2/11-2/12/2022 bnp 73908 - 18351   cxr 2/11/2022 cxr nsc 1/14/2022   nsc mod r pl effsn or basal airspace disease   cm  2/11/2022 bumetanide 2.2 iv   2/14/2022 sacubitril 24 valsartan 25 bid   2/14/2022 spironolact 25   A fib   2/11/2022 apixaba 5.2  2/11/2022 amiodarine  200   2/12/2022 metoprolol 25   CAD  2/11/2022 ekg nsr t wave abd consider lateral ischemia   2/11/2022 ASA 81   2/11/2022 atorvastat 40  A/R  Rx CHF   monitor k   Cont amiodarone apixaban   Cardio on case         DERRICK   Cr 2/11-2/12-2/13-2/14-2/15/2022 Cr 1.6 - 1.6- 1.5-1.4- 1.2   monitor     Elevated LFTS   LFTS 2/11-2/12/2022   -293  AST 55-34  ALT 53 -42  Likely sec to chf      TIME SPENT   Over 25 minutes aggregate care time spent on encounter; activities included   direct patient care, counseling and/or coordinating care reviewing notes, lab data/ imaging , discussion with multidisciplinary team/ patient  /family and explaining in detail risks, benefits, alternatives  of the recommendations     BIPIN DE LA CRUZ 61 m 2/11/2022 1960 DR KELVIN VANESSA

## 2022-02-22 NOTE — CHART NOTE - NSCHARTNOTEFT_GEN_A_CORE
Nutrition Follow Up Note    Seen for follow up     Source: EMR, Patient     Chart reviewed, events noted.     Per chart, 60 YO homeless M  w/ pmhx of   CAD, dilated cardiomyopathy, s/p ICD, atrial flutter on Eliquis, substance abuse being treated for covid -19 and acute on chronic heart failure.    Diet :  Diet, Consistent Carbohydrate w/Evening Snack:   Low Sodium  Supplement Feeding Modality:  Oral  Glucerna Shake Cans or Servings Per Day:  1       Frequency:  Three Times a day (02-18-22 @ 15:52)      Nutrition Events:   -Intake: per flowsheet, % intake noted; pt reports good appetite and obtained preferences   -GI: last BM reported 2/22; no bowel regimen ordered   -Endo: no insulin ordered; Consistent carbohydrate diet   -Renal: elevated BUN; continue to monitor   -Resp: acute on chronic CHF; COVID negative (2/21)         Pertinent Medications:  MEDICATIONS  (STANDING):  aMIOdarone    Tablet 200 milliGRAM(s) Oral daily  apixaban 5 milliGRAM(s) Oral every 12 hours  aspirin enteric coated 81 milliGRAM(s) Oral daily  atorvastatin 40 milliGRAM(s) Oral at bedtime  buMETAnide 2 milliGRAM(s) Oral two times a day  ceFAZolin   IVPB 2000 milliGRAM(s) IV Intermittent every 8 hours  chlorhexidine 4% Liquid 1 Application(s) Topical <User Schedule>  gabapentin 100 milliGRAM(s) Oral three times a day  lactobacillus acidophilus 1 Tablet(s) Oral two times a day  lidocaine   4% Patch 1 Patch Transdermal daily  metoprolol succinate ER 25 milliGRAM(s) Oral daily  midodrine. 5 milliGRAM(s) Oral once  pantoprazole    Tablet 40 milliGRAM(s) Oral before breakfast  spironolactone 25 milliGRAM(s) Oral daily    MEDICATIONS  (PRN):  acetaminophen     Tablet .. 650 milliGRAM(s) Oral every 6 hours PRN Temp greater or equal to 38C (100.4F), Mild Pain (1 - 3)  ALBUTerol    90 MICROgram(s) HFA Inhaler 2 Puff(s) Inhalation every 6 hours PRN Shortness of Breath and/or Wheezing  aluminum hydroxide/magnesium hydroxide/simethicone Suspension 30 milliLiter(s) Oral every 4 hours PRN Dyspepsia  guaiFENesin Oral Liquid (Sugar-Free) 200 milliGRAM(s) Oral every 6 hours PRN Cough  melatonin 3 milliGRAM(s) Oral at bedtime PRN Insomnia  ondansetron Injectable 4 milliGRAM(s) IV Push every 8 hours PRN Nausea and/or Vomiting  simethicone 80 milliGRAM(s) Chew three times a day PRN Indigestion  sodium chloride 0.9% lock flush 10 milliLiter(s) IV Push every 1 hour PRN Pre/post blood products, medications, blood draw, and to maintain line patency        Pertinent Labs:  02-22 Na141 mmol/L Glu 111 mg/dL<H> K+ 4.4 mmol/L Cr  1.20 mg/dL BUN 59 mg/dL<H> 02-22 Alb 2.1 g/dL<L> 02-12 Chol 139 mg/dL LDL --    HDL 20 mg/dL<L> Trig 90 mg/dL     CAPILLARY BLOOD GLUCOSE           Pressure Injury per chart:   -Sacrum: stage 2   -L buttocks: stage 2   -Coccyx: stage 2     Edema per chart:   -1+ L/R leg     Estimated Needs:   [x] no change since previous assessment        Previous Nutrition Diagnosis: altered nutrition related labs   Nutrition Diagnosis is: ongoing     New Nutrition Diagnosis: N/A    Interventions and Recommendations  1) Continue Consistent carbohydrate, low sodium diet with Glucerna TID      Monitoring and Evaluation:     [X] PO intake [X] Tolerance to diet prescription [X] weight trends [x] skin integrity     RD remains available and will follow up per protocol.

## 2022-02-22 NOTE — PROGRESS NOTE ADULT - NSPROGADDITIONALINFOA_GEN_ALL_CORE
25 minutes aggregate time was spent on this visit, 50% visit time spent in care co-ordination with other attendings and counselling patient .I have discussed care plan with patient / HCP/family member ,who expressed understanding of problems treatment and their effect and side effects, alternatives in details. I have asked if they have any questions and concerns and appropriately addressed them to best of my ability.

## 2022-02-23 NOTE — PROGRESS NOTE ADULT - ASSESSMENT
61 yr old male with CAD, dilated cardiomyopathy, s/p ICD, atrial flutter on Eliquis, substance abuse was admitted initially from 12/24 for decompensated CHF, was on Milrinone and Bumex drips, left AMA on 12/31 and returned to the ED later in the day as he had to take care of personal matter. He reported shortness of breath and right arm and foot pain on presentation. Cardiology and Heart Failure specialist were consulted, he was placed on oral Bumex., subsequently transitioned to Bumex gtt. Bumex drip stopped on 1/12. Stopped  Milrinone 1/14 transitioned to oral Torsemide , metoprolol . Counseling given re diet and medication compliance .Not candidate for advanced therapies due to smoking/cocaine use history and non-compliance. Patient reported he was recently evicted from his apartment and is homeless, social work consult placed. Seen by PT and recommend  [facility]. The patient was medically stable for discharge.  Subsequently pt developed COVID-19 infection was admitted to [facility] and discharged a few days ago, now returns to ER because of increasing sob and fevers.    remains COVID positive  Hypoxemia - resp distress  CKD -   Cardiomyopathy - Systolic CHF  Non - Domicile  AF hx  Substance Use Disorder    fever noted - remains on ABX  Bumex Diuresis    old records reviewed  tox screen reviewed  pt is homeless at present - no immediate family  uses [facility] - [facility] - Smoker - Opioids   advanced Heart disease and end stage CHF  recent hospitalization at [facility]  fio2 support  counseling  emotional support  SW involvement  SBIRT  assist with needs  prognosis guarded

## 2022-02-23 NOTE — PROGRESS NOTE ADULT - ASSESSMENT
The patient is a 61 year old male with a history of CAD, chronic systolic heart failure s/p ICD, atrial flutter s/p DCCV, substance abuse, CKD who is admitted with acute on chronic systolic heart failure, COVID.    Plan:  - Echo 11/21 with severely reduced LV and RV function, mod/severe MR, mod TR  - Continue metoprolol succinate 25 mg daily  - Continue spironolactone 25 mg daily  - Hold Entresto due to low BPs - resume as outpatient  - Continue bumetanide 2 mg PO bid  - Continue amiodarone 200 mg daily  - Continue apixaban 5 mg bid  - Continue aspirin 81 mg daily  - Continue atorvastatin 40 mg daily  - Staph bacteremia - on cefazolin. TTE with no obvious endocarditis. PICC placed. Recurrent fevers - if VANESSA needed for further work-up, recommend transfer to [facility], where the patient is well known to their cardiology service.

## 2022-02-23 NOTE — PROGRESS NOTE ADULT - ASSESSMENT
61 yr old male ,known to me from [facility]  with med hx of  CAD, dilated cardiomyopathy, s/p ICD, atrial flutter on Eliquis, substance abuse was admitted initially from 12/24 for decompensated CHF, was on Milrinone and Bumex drips, left AMA on 12/31 and returned to the ED later in the day as he had to take care of personal matter. He reported shortness of breath and right arm and foot pain on presentation. Cardiology and Heart Failure specialist were consulted, he was placed on oral Bumex., subsequently transitioned to Bumex gtt. Bumex drip stopped on 1/12. Stopped  Milrinone 1/14 transitioned to oral Torsemide , metoprolol . Counseling given re diet and medication compliance .Not candidate for advanced therapies due to smoking/cocaine use history and non-compliance. Patient reported he was recently evicted from his apartment and is homeless, social work consult placed. Seen by PT and recommend  [facility]. The patient was medically stable for discharge.  Subsequently pt developed COVID-19 infection was admitted to [facility] and discharged a few days ago, now returns to ER from rehab  because of increasing sob and fevers .Found to have positive COVID test ,seen by ID & pulm consult requested Palliative care consult requested ,to discuss advance directives and complete MOL  (11 Feb 2022 13:41)      ACUTE RENAL FAILURE:   Serum creatinine is  at 1.6    , approximating GFR decreased .   There is no progression . No uremic symptoms  No evidence of anemia .  Fluid status stable.  Will continue to avoid nephrotoxic drugs.  Patient remains asymptomatic.   Continue current therapy.    BP monitoring,continue current antihypertensive meds, low salt diet,followup with PMD in 1-2 weeks  metoprolol succinate ER 25 milliGRAM(s) Oral daily    Admit for septic workup and ID evaluation,send blood and urine cx,serial lactate levels,monitor vitals closley,ivfs hydration,monitor urine output and renal profile,iv abx as per id cons  ceFAZolin   IVPB 2000 milliGRAM(s) IV Intermittent every 8 hours

## 2022-02-23 NOTE — PROGRESS NOTE ADULT - ASSESSMENT
61 year old male with a history of CAD, end stage chronic systolic heart failure s/p ICD, atrial flutter s/p DCCV, substance abuse, CKD COVID 19 1/23/22 admitted with Acute hypoxic respiratory failure likely sec CHF exacerbation Markedly elevated BNP. S/p HFNC.   Staph Aureus bacteremia -source of bacteremia unclear   Prob skin source- pt c/o mild tenderness at old IV sites on left arm though no appreciable induration erythema or palpable cord  Has AICD Echo neg for endocarditis  C/o back pain  Had gallium scan ? cervical discitis  Unable to do MRI due to AICD  COVID 19- pt had been positive since 1/23/22.  Pt is vaccinated against COVID 19.  COVID negative as 2/14 2/22 febrile Tm 102.1F, asymptomatic, no new complaints or exam findings, PICC line looks ok    Plan :  2/14 Repeat blood cultures negative  Repeat blood cultures today given fevers - ordered   Cont Ancef 2grams q8 x 6 weeks 3/26/22  S/p RUE PICC line 2/18  Off Decadron   Diurese per cardiology  Trend temps and cbc      Indu Rivas M.D.  [facility], Division of Infectious Diseases  [phone]  After 5pm on weekdays and all day on weekends - please call [phone]

## 2022-02-23 NOTE — PROGRESS NOTE ADULT - ASSESSMENT
BIPIN DE LA CRUZ 61 m 2/11/2022 1960 DR KELVIN VANESSA     REVIEW OF SYMPTOMS      Able to give (reliable) ROS  NO     PHYSICAL EXAM    HEENT Unremarkable  atraumatic   RESP Fair air entry EXP prolonged    Harsh breath sound Resp distres mild   CARDIAC S1 S2 No S3     NO JVD    ABDOMEN SOFT BS PRESENT NOT DISTENDED No hepatosplenomegaly   PEDAL EDEMA present No calf tenderness  NO rash       DOA/CC/PROBLEMS poa .  2/11/2022 61 m as per EMS, SOB (D/C'D from [facility] 2/1 for covid)  shortness of breath    PROBLEMS.    Resp failure poa 2/11/2022  HFNC 50% on 2/11/2022 2/16/2022 4l   2/17 32%  742/53/85   r Pl effs 2/11 cxr   COPD pmh    COVID (+) 2/11/2022   AICD  S aureus bacteremia mssa 2/11  Ancef 2/12/2022 Dr Phan    Lacticemia poa 2/11/2022 la 2.5   AOC CHF poa 2/11/2022 bnp 14087  pmh Dilated cmpthy  ECHO 11/20/2021 ef 20%  A fib (on eliquis) pmh   2/11/2022 apixaba 5.2     MISC ISSUES.                       COVID  STATUS.   scv2 2/11/2022 (+)         ICU STAY. none  GOC.  2/13/2022 full code     BEST PRACTICE ISSUES.                                                  HEAD OF BED ELEVATION. Yes  DVT PROPHYLAXIS.  2/11/2022 apixaba 5.2  a fib     WHITMORE PROPHYLAXIS.     2/11/2022 protonix 40                                                                                    DIET.   soft bite 1.2 l fr               ASSESSMENT/RECOMMENDATIONS.    HEMODYNAMICS.   Monitor bp Target MAP 65 (+)      PMH/PSH issues  were addressed.  Management continued/adjusted as indicated         RESP.   Monitor po Target po 90-95%    ABG.   2/11 50% hfnc 746/34/78    OXYGEN REQUIREMENTS.  2/21/2022 3l 91%      PROSTH/TUBES/LINES.  poa AICD     INFECTION.  Staph aureus bacteremia 2/11  T 2/21/2022 T 100f   W 2/11-2/12-2/13-2/15/2022 w 17-15 - 13 - 9.7   pr 2/12/2022 24  ECHO 2/13/2022 no endocarditis  cxr 2/11/2022 cxr nsc 1/14/2022   nsc mod r pl effsn or basal airspace disease   cm  Flu AB 2/11/2022 (-)   RSV 2/11/2022 (-)  blod c 2/14 (-)  blod c 2/12 staph a   blod c 2/11 mssa   abio 2/12/2022 ancef 1.3 Dr Garcia sa 6 w 3/26    CARDIAC.  HEMODYNAMICS.  Lacticemia poa 2/11/2022   la 2/11/2022 la 2.  AOC CHF poa 2/11/2022  cr 2/16/2022 cr 1.1   ECHO 2/13/2022 no endocarditis severe lvsd mod mr mild ph pa 42   ECHO 11/20/2021 ef 20% deidra rve all severely enlargd pasp 37 mod to severe mr   bnp 2/11-2/12/2022 bnp 45020 - 96538   cxr 2/11/2022 cxr nsc 1/14/2022   nsc mod r pl effsn or basal airspace disease   cm  2/11/2022 bumetanide 2.2 iv   2/14/2022 sacubitril 24 valsartan 25 bid   2/14/2022 spironolact 25   A fib   2/11/2022 apixaba 5.2  2/11/2022 amiodarine  200   2/12/2022 metoprolol 25   CAD  2/11/2022 ekg nsr t wave abd consider lateral ischemia   2/11/2022 ASA 81   2/11/2022 atorvastat 40  A/R  Rx CHF   monitor k   Cont amiodarone apixaban   Cardio on case       TIME SPENT   Over 25 minutes aggregate care time spent on encounter; activities included   direct patient care, counseling and/or coordinating care reviewing notes, lab data/ imaging , discussion with multidisciplinary team/ patient  /family and explaining in detail risks, benefits, alternatives  of the recommendations     BIPIN DE LA CRUZ 61 m 2/11/2022 1960 DR KELVIN VANESSA

## 2022-02-23 NOTE — PROGRESS NOTE ADULT - ASSESSMENT
61 yr old male with CAD, dilated cardiomyopathy, s/p ICD, atrial flutter on Eliquis, substance abuse was admitted initially from 12/24 for decompensated CHF, was on Milrinone and Bumex drips, left AMA on 12/31 and returned to the ED later in the day as he had to take care of personal matter. He reported shortness of breath and right arm and foot pain on presentation. Cardiology and Heart Failure specialist were consulted, he was placed on oral Bumex., subsequently transitioned to Bumex gtt. Bumex drip stopped on 1/12. Stopped  Milrinone 1/14 transitioned to oral Torsemide , metoprolol . Counseling given re diet and medication compliance .Not candidate for advanced therapies due to smoking/cocaine use history and non-compliance. Patient reported he was recently evicted from his apartment and is homeless, social work consult placed. Seen by PT and recommend  [facility]. The patient was medically stable for discharge.  Subsequently pt developed COVID-19 infection was admitted to [facility] and discharged a few days ago, now returns to ER from rehab  because of increasing sob and fevers .Found to have positive COVID test ,seen by ID & pulm consult requested Palliative care consult requested ,to discuss advance directives and complete MOLST  Cont Ancef 2grams q8 x 6 weeks 3/26/22  S/p RUE PICC line 2/18  Off Decadron   Diurese per cardiology  Trend temps and cbc  Monitor fever curve. If fever >101F, will have to repeat cx

## 2022-02-24 NOTE — PROGRESS NOTE ADULT - ASSESSMENT
61 year old male with a history of CAD, end stage chronic systolic heart failure s/p ICD, atrial flutter s/p DCCV, substance abuse, CKD COVID 19 1/23/22 admitted with Acute hypoxic respiratory failure likely sec CHF exacerbation Markedly elevated BNP. S/p HFNC.   Staph Aureus bacteremia -source of bacteremia unclear   Prob skin source- pt c/o mild tenderness at old IV sites on left arm though no appreciable induration erythema or palpable cord  Has AICD Echo neg for endocarditis  C/o back pain  Had gallium scan ? cervical discitis  Unable to do MRI due to AICD  COVID 19- pt had been positive since 1/23/22.  Pt is vaccinated against COVID 19.  COVID negative as 2/14 2/22 febrile Tm 102.1F, asymptomatic, no new complaints or exam findings, PICC line looks ok  2/23 - afebrile x24h    Plan :  2/14 Repeat blood cultures negative  Follow repeat blood cultures, in process  CT neck ordered to r/o infectious source   Cont Ancef 2grams q8 x 6 weeks 3/26/22  S/p RUE PICC line 2/18  Off Decadron   Cardiology following  Trend temps and cbc      Indu Rivas M.D.  [facility], Division of Infectious Diseases  [phone]  After 5pm on weekdays and all day on weekends - please call [phone]

## 2022-02-24 NOTE — PROGRESS NOTE ADULT - ASSESSMENT
The patient is a 61 year old male with a history of CAD, chronic systolic heart failure s/p ICD, atrial flutter s/p DCCV, substance abuse, CKD who is admitted with acute on chronic systolic heart failure, COVID.    Plan:  - Echo 11/21 with severely reduced LV and RV function, mod/severe MR, mod TR  - Continue metoprolol succinate 25 mg daily  - Continue spironolactone 25 mg daily  - BPs slightly higher - resume Entresto 24-26 mg bid  - Continue bumetanide 2 mg PO bid  - Continue amiodarone 200 mg daily  - Continue apixaban 5 mg bid  - Continue aspirin 81 mg daily  - Continue atorvastatin 40 mg daily  - Staph bacteremia - on cefazolin. TTE with no obvious endocarditis. PICC placed. Recurrent fevers - if VANESSA needed for further work-up, recommend transfer to [facility], where the patient is well known to their cardiology service.  - ID follow-up

## 2022-02-24 NOTE — PROGRESS NOTE ADULT - ASSESSMENT
BIPIN DE LA CRUZ 61 m 2/11/2022 1960 DR KELVIN VANESSA     REVIEW OF SYMPTOMS      Able to give (reliable) ROS  NO     PHYSICAL EXAM    HEENT Unremarkable  atraumatic   RESP Fair air entry EXP prolonged    Harsh breath sound Resp distres mild   CARDIAC S1 S2 No S3     NO JVD    ABDOMEN SOFT BS PRESENT NOT DISTENDED No hepatosplenomegaly   PEDAL EDEMA present No calf tenderness  NO rash       DOA/CC/PROBLEMS poa .  2/11/2022 61 m as per EMS, SOB (D/C'D from [facility] 2/1 for covid)  shortness of breath    PROBLEMS.    Resp failure poa 2/11/2022  HFNC 50% on 2/11/2022 2/16/2022 4l   2/17 32%  742/53/85   r Pl effs 2/11 cxr   COPD pmh    COVID (+) 2/11/2022   AICD  S aureus bacteremia mssa 2/11  Ancef 2/12/2022 Dr Phan    Lacticemia poa 2/11/2022 la 2.5   AOC CHF poa 2/11/2022 bnp 55355  pmh Dilated cmpthy  ECHO 11/20/2021 ef 20%  A fib (on eliquis) pmh   2/11/2022 apixaba 5.2     MISC ISSUES.                       COVID  STATUS.   scv2 2/11/2022 (+)         ICU STAY. none  GOC.  2/13/2022 full code     BEST PRACTICE ISSUES.                                                  HEAD OF BED ELEVATION. Yes  DVT PROPHYLAXIS.  2/11/2022 apixaba 5.2  a fib     WHITMORE PROPHYLAXIS.     2/11/2022 protonix 40                                                                                    DIET.   soft bite 1.2 l fr      ASSESSMENT/RECOMMENDATIONS.    HEMODYNAMICS.   Monitor bp Target MAP 65 (+)      PMH/PSH issues  were addressed.  Management continued/adjusted as indicated         RESP.   Monitor po Target po 90-95%    ABG.   2/11 50% hfnc 746/34/78    OXYGEN REQUIREMENTS.  2/21/2022 3l 91%      PROSTH/TUBES/LINES.  poa AICD     INFECTION.  Staph aureus bacteremia 2/11  T 2/21/2022 T 100f   W 2/11-2/12-2/13-2/15/2022 w 17-15 - 13 - 9.7   pr 2/12-2/24/2022 24 - .9   ECHO 2/13/2022 no endocarditis  cxr 2/11/2022 cxr nsc 1/14/2022   nsc mod r pl effsn or basal airspace disease   cm  Flu AB 2/11/2022 (-)   RSV 2/11/2022 (-)  blod c 2/23 (-)  blod c 2/14 (-)  blod c 2/12 staph a   blod c 2/11 mssa   abio 2/12/2022 ancef 1.3 Dr Garcia sa 6 w 3/26    CARDIAC.  HEMODYNAMICS.  Lacticemia poa 2/11/2022   la 2/11/2022 la 2.  AOC CHF poa 2/11/2022  cr 2/16/2022 cr 1.1   ECHO 2/13/2022 no endocarditis severe lvsd mod mr mild ph pa 42   ECHO 11/20/2021 ef 20% deidra rve all severely enlargd pasp 37 mod to severe mr   bnp 2/11-2/12/2022 bnp 35812 - 76102   cxr 2/11/2022 cxr nsc 1/14/2022   nsc mod r pl effsn or basal airspace disease   cm  2/11/2022 bumetanide 2.2 iv   2/14/2022 sacubitril 24 valsartan 25 bid   2/14/2022 spironolact 25   A fib   2/11/2022 apixaba 5.2  2/11/2022 amiodarine  200   2/12/2022 metoprolol 25   CAD  2/11/2022 ekg nsr t wave abd consider lateral ischemia   2/11/2022 ASA 81   2/11/2022 atorvastat 40  A/R  Rx CHF   monitor k   Cont amiodarone apixaban   Cardio on case       TIME SPENT   Over 25 minutes aggregate care time spent on encounter; activities included   direct patient care, counseling and/or coordinating care reviewing notes, lab data/ imaging , discussion with multidisciplinary team/ patient  /family and explaining in detail risks, benefits, alternatives  of the recommendations     BIPIN DE LA CRUZ 61 m 2/11/2022 1960 DR KELVIN VANESSA

## 2022-02-24 NOTE — PROGRESS NOTE ADULT - ASSESSMENT
61 yr old male with CAD, dilated cardiomyopathy, s/p ICD, atrial flutter on Eliquis, substance abuse was admitted initially from 12/24 for decompensated CHF, was on Milrinone and Bumex drips, left AMA on 12/31 and returned to the ED later in the day as he had to take care of personal matter. He reported shortness of breath and right arm and foot pain on presentation. Cardiology and Heart Failure specialist were consulted, he was placed on oral Bumex., subsequently transitioned to Bumex gtt. Bumex drip stopped on 1/12. Stopped  Milrinone 1/14 transitioned to oral Torsemide , metoprolol . Counseling given re diet and medication compliance .Not candidate for advanced therapies due to smoking/cocaine use history and non-compliance. Patient reported he was recently evicted from his apartment and is homeless, social work consult placed. Seen by PT and recommend  [facility]. The patient was medically stable for discharge.  Subsequently pt developed COVID-19 infection was admitted to [facility] and discharged a few days ago, now returns to ER because of increasing sob and fevers.    remains COVID positive  Hypoxemia - resp distress  CKD -   Cardiomyopathy - Systolic CHF  Non - Domicile  AF hx  Substance Use Disorder    Bumex Diuresis  Plan for CT neck     old records reviewed  tox screen reviewed  pt is homeless at present - no immediate family  uses [facility] - [facility] - Smoker - Opioids   advanced Heart disease and end stage CHF  recent hospitalization at [facility]  fio2 support  counseling  emotional support  SW involvement  SBIRT  assist with needs  prognosis guarded

## 2022-02-24 NOTE — PROGRESS NOTE ADULT - ASSESSMENT
61 yr old male ,known to me from [facility]  with med hx of  CAD, dilated cardiomyopathy, s/p ICD, atrial flutter on Eliquis, substance abuse was admitted initially from 12/24 for decompensated CHF, was on Milrinone and Bumex drips, left AMA on 12/31 and returned to the ED later in the day as he had to take care of personal matter. He reported shortness of breath and right arm and foot pain on presentation. Cardiology and Heart Failure specialist were consulted, he was placed on oral Bumex., subsequently transitioned to Bumex gtt. Bumex drip stopped on 1/12. Stopped  Milrinone 1/14 transitioned to oral Torsemide , metoprolol . Counseling given re diet and medication compliance .Not candidate for advanced therapies due to smoking/cocaine use history and non-compliance. Patient reported he was recently evicted from his apartment and is homeless, social work consult placed. Seen by PT and recommend  [facility]. The patient was medically stable for discharge.  Subsequently pt developed COVID-19 infection was admitted to [facility] and discharged a few days ago, now returns to ER from rehab  because of increasing sob and fevers .Found to have positive COVID test ,seen by ID & pulm consult requested Palliative care consult requested ,to discuss advance directives and complete MOL  (11 Feb 2022 13:41)      ACUTE RENAL FAILURE:   Serum creatinine is  at 1.6    , approximating GFR decreased .   There is no progression . No uremic symptoms  No evidence of anemia .  Fluid status stable.  Will continue to avoid nephrotoxic drugs.  Patient remains asymptomatic.   Continue current therapy.    BP monitoring,continue current antihypertensive meds, low salt diet,followup with PMD in 1-2 weeks  metoprolol succinate ER 25 milliGRAM(s) Oral daily    Admit for septic workup and ID evaluation,send blood and urine cx,serial lactate levels,monitor vitals closley,ivfs hydration,monitor urine output and renal profile,iv abx as per id cons  ceFAZolin   IVPB 2000 milliGRAM(s) IV Intermittent every 8 hours

## 2022-02-25 NOTE — PROGRESS NOTE ADULT - PROBLEM SELECTOR PLAN 10
Gastrointestinal stress ulcer prophylaxis and DVT prophylaxis administered

## 2022-02-25 NOTE — PROGRESS NOTE ADULT - ASSESSMENT
61 yr old male with CAD, dilated cardiomyopathy, s/p ICD, atrial flutter on Eliquis, substance abuse was admitted initially from 12/24 for decompensated CHF, was on Milrinone and Bumex drips, left AMA on 12/31 and returned to the ED later in the day as he had to take care of personal matter. He reported shortness of breath and right arm and foot pain on presentation. Cardiology and Heart Failure specialist were consulted, he was placed on oral Bumex., subsequently transitioned to Bumex gtt. Bumex drip stopped on 1/12. Stopped  Milrinone 1/14 transitioned to oral Torsemide , metoprolol . Counseling given re diet and medication compliance .Not candidate for advanced therapies due to smoking/cocaine use history and non-compliance. Patient reported he was recently evicted from his apartment and is homeless, social work consult placed. Seen by PT and recommend  [facility]APIN[facility]O. The patient was medically stable for discharge.  Subsequently pt developed COVID-19 infection was admitted to [facility] and discharged a few days ago, now returns to ER because of increasing sob and fevers.    remains COVID positive  Hypoxemia - resp distress  CKD -   Cardiomyopathy - Systolic CHF  Non - Domicile  AF hx  Substance Use Disorder    entresto added  vs noted    old records reviewed  tox screen reviewed  pt is homeless at present - no immediate family  uses [facility] - [facility] - Smoker - Opioids   advanced Heart disease and end stage CHF  recent hospitalization at [facility]  fio2 support  counseling  emotional support  SW involvement  SBIRT  assist with needs  prognosis guarded

## 2022-02-25 NOTE — PROGRESS NOTE ADULT - PROBLEM SELECTOR PLAN 8
Gastrointestinal stress ulcer prophylaxis and DVT prophylaxis administered
Admit for iv hydration,monitor renal profile and urine output,nutritionist consult,prealbumin level,serial bmp,nutritional supplements,multivitamins,palliative care evaluuation regarding MOLST completion and artificial nutrition discussion
Admit for iv hydration,monitor renal profile and urine output,nutritionist consult,prealbumin level,serial bmp,nutritional supplements,multivitamins,palliative care evaluuation regarding MOLST completion and artificial nutrition discussion
Gastrointestinal stress ulcer prophylaxis and DVT prophylaxis administered
Admit for iv hydration,monitor renal profile and urine output,nutritionist consult,prealbumin level,serial bmp,nutritional supplements,multivitamins,palliative care evaluuation regarding MOLST completion and artificial nutrition discussion
Gastrointestinal stress ulcer prophylaxis and DVT prophylaxis administered
Admit for iv hydration,monitor renal profile and urine output,nutritionist consult,prealbumin level,serial bmp,nutritional supplements,multivitamins,palliative care evaluuation regarding MOLST completion and artificial nutrition discussion

## 2022-02-25 NOTE — PROGRESS NOTE ADULT - PROBLEM SELECTOR PROBLEM 4
Chronic systolic congestive heart failure
Chronic systolic congestive heart failure
Sepsis
Chronic systolic congestive heart failure
Chronic systolic congestive heart failure
Sepsis
Chronic systolic congestive heart failure
Sepsis
Chronic systolic congestive heart failure
Chronic systolic congestive heart failure
Sepsis
Chronic systolic congestive heart failure

## 2022-02-25 NOTE — DISCHARGE NOTE NURSING/CASE MANAGEMENT/SOCIAL WORK - PATIENT PORTAL LINK FT
You can access the FollowMyHealth Patient Portal offered by [facility] by registering at the following website: [email or web address]. By joining [employer]’s FollowMyHealth portal, you will also be able to view your health information using other applications (apps) compatible with our system.

## 2022-02-25 NOTE — DISCHARGE NOTE NURSING/CASE MANAGEMENT/SOCIAL WORK - NSDCVIVACCINE_GEN_ALL_CORE_FT
influenza, injectable, quadrivalent, preservative free; 27-Nov-2016 15:07; Tala Wynne (RN); [employer]; [id number]; IntraMuscular; Deltoid Right.; 0.5 milliLiter(s); VIS (VIS Published: 07-Aug-2015, VIS Presented: 27-Nov-2016);

## 2022-02-25 NOTE — PROGRESS NOTE ADULT - NUTRITIONAL ASSESSMENT
61 year old male with a history of CAD, end stage chronic systolic heart failure s/p ICD, atrial flutter s/p DCCV, substance abuse, CKD COVID 19 1/23/22 admitted with Acute hypoxic respiratory failure likely sec CHF exacerbation Markedly elevated BNP. On HFNC with Staph Aureus bacteremia  Source of bacteremia unclear   Prob skin source- pt c/o mild tenderness at old IV sites on left arm though no appreciable induration erythema or palpable cord  Has AICD Echo neg for endocarditis  C/o back pain  Had gallium scan ? cervical discitis  Unable to do MRI due to AICD  COVID 19- pt had been positive since 1/23/22.  Pt is vaccinated against COVID 19.    Plan :  Cont Ancef  2grams q8 x 6 weeks 3/26/22  PICC line  Fu repeat blood cultures- NGTD  Off Decadron   Diurese per cardiology  Trend temps and cbc    Infectious Diseases will continue to follow. Please call with any questions.   Shila Frazier M.D.  [facility], Division of Infectious Diseases [phone]
MEDICATIONS  (STANDING):  aMIOdarone    Tablet 200 milliGRAM(s) Oral daily  apixaban 5 milliGRAM(s) Oral every 12 hours  aspirin enteric coated 81 milliGRAM(s) Oral daily  atorvastatin 40 milliGRAM(s) Oral at bedtime  buDESOnide    Inhalation Suspension 0.5 milliGRAM(s) Inhalation two times a day  buMETAnide Injectable 2 milliGRAM(s) IV Push every 12 hours  ceFAZolin   IVPB 2000 milliGRAM(s) IV Intermittent every 8 hours  dexAMETHasone     Tablet 6 milliGRAM(s) Oral daily  gabapentin 100 milliGRAM(s) Oral three times a day  lactobacillus acidophilus 1 Tablet(s) Oral two times a day  metoprolol succinate ER 25 milliGRAM(s) Oral daily  pantoprazole    Tablet 40 milliGRAM(s) Oral before breakfast
61 year old male with a history of CAD, end stage chronic systolic heart failure s/p ICD, atrial flutter s/p DCCV, substance abuse, CKD COVID 19 1/23/22 admitted with Acute hypoxic respiratory failure likely sec CHF exacerbation Markedly elevated BNP. On HFNC with Staph Aureus bacteremia  Source of bacteremia unclear   Prob skin source- pt c/o mild tenderness at old IV sites on left arm though no appreciable induration erythema or palpable cord  Has AICD Echo neg for endocarditis  C/o back pain  Had gallium scan ? cervical discitis  Unable to do MRI due to AICD  COVID 19- pt had been positive since 1/23/22.  Pt is vaccinated against COVID 19.    Plan :  Cont Ancef  2grams q8 x 6 weeks 3/26/22  PICC line  Fu repeat blood cultures- NGTD  Off Decadron   Diurese per cardiology  Trend temps and cbc    Infectious Diseases will continue to follow. Please call with any questions.   Shila Frazier M.D.  [facility], Division of Infectious Diseases [phone]
MEDICATIONS  (STANDING):  aMIOdarone    Tablet 200 milliGRAM(s) Oral daily  apixaban 5 milliGRAM(s) Oral every 12 hours  aspirin enteric coated 81 milliGRAM(s) Oral daily  atorvastatin 40 milliGRAM(s) Oral at bedtime  buDESOnide    Inhalation Suspension 0.5 milliGRAM(s) Inhalation two times a day  buMETAnide Injectable 2 milliGRAM(s) IV Push every 12 hours  ceFAZolin   IVPB 2000 milliGRAM(s) IV Intermittent every 8 hours  dexAMETHasone     Tablet 6 milliGRAM(s) Oral daily  gabapentin 100 milliGRAM(s) Oral three times a day  lactobacillus acidophilus 1 Tablet(s) Oral two times a day  metoprolol succinate ER 25 milliGRAM(s) Oral daily  pantoprazole    Tablet 40 milliGRAM(s) Oral before breakfast
MEDICATIONS  (STANDING):  aMIOdarone    Tablet 200 milliGRAM(s) Oral daily  apixaban 5 milliGRAM(s) Oral every 12 hours  aspirin enteric coated 81 milliGRAM(s) Oral daily  atorvastatin 40 milliGRAM(s) Oral at bedtime  buMETAnide 2 milliGRAM(s) Oral two times a day  ceFAZolin   IVPB 2000 milliGRAM(s) IV Intermittent every 8 hours  chlorhexidine 4% Liquid 1 Application(s) Topical <User Schedule>  gabapentin 100 milliGRAM(s) Oral three times a day  lactobacillus acidophilus 1 Tablet(s) Oral two times a day  lidocaine   4% Patch 1 Patch Transdermal daily  metoprolol succinate ER 25 milliGRAM(s) Oral daily  midodrine. 5 milliGRAM(s) Oral once  pantoprazole    Tablet 40 milliGRAM(s) Oral before breakfast

## 2022-02-25 NOTE — PROGRESS NOTE ADULT - PROBLEM SELECTOR PROBLEM 8
Prophylactic measure
DERRICK (acute kidney injury)
DERRICK (acute kidney injury)
Prophylactic measure
DERRICK (acute kidney injury)
Prophylactic measure
DERRICK (acute kidney injury)

## 2022-02-25 NOTE — PROGRESS NOTE ADULT - PROBLEM SELECTOR PROBLEM 1
Acute respiratory failure with hypoxia
Acute respiratory failure with hypoxia
Low grade fever
Acute respiratory failure with hypoxia
Acute febrile illness
Acute respiratory failure with hypoxia
Acute febrile illness
Acute respiratory failure with hypoxia
Acute febrile illness

## 2022-02-25 NOTE — PROGRESS NOTE ADULT - PROBLEM SELECTOR PLAN 5
Admitted  to telemetry unit for monitoring , send 3 sets of cardiac enzymes to rule out acute coronary event, obtain ECHO to evaluate LVEF, cardiology consult  ,continue current management, O2 supply, anticoagulation plan as per cardiology consult
Admitted  to telemetry unit for monitoring , send 3 sets of cardiac enzymes to rule out acute coronary event, obtain ECHO to evaluate LVEF, cardiology consult  ,continue current management, O2 supply, anticoagulation plan as per cardiology consult
Source of bacteremia unclear   Prob skin source- pt c/o mild tenderness at old IV sites on left arm though no appreciable induration erythema or palpable cord  Has AICD Echo neg for endocarditis  C/o back pain  Had gallium scan ? cervical discitis Spoke to ortho team on a phone ,imaging reviewed -no bone pathology or discitis identified ,as per ortho team -no consult needed , CT neck done -no soft tissue infection identified ,cleared by ID for d/c ,d/w Dr Phan 02/18/22 ON IV ANCEF UNTIL 03/26/22   Unable to do MRI due to AICD  COVID 19- pt had been positive since 1/23/22.  Pt is vaccinated against COVID 19.
Source of bacteremia unclear   Prob skin source- pt c/o mild tenderness at old IV sites on left arm though no appreciable induration erythema or palpable cord  Has AICD Echo neg for endocarditis  C/o back pain  Had gallium scan ? cervical discitis Spoke to ortho team on a phone ,imaging reviewed -no bone pathology or discitis identified ,as per ortho team -no consult needed , CT neck done -no soft tissue infection identified ,cleared by ID for d/c ,d/w Dr Phan 02/18/22 ON IV ANCEF UNTIL 03/26/22   Unable to do MRI due to AICD  COVID 19- pt had been positive since 1/23/22.  Pt is vaccinated against COVID 19.
Admitted  to telemetry unit for monitoring , send 3 sets of cardiac enzymes to rule out acute coronary event, obtain ECHO to evaluate LVEF, cardiology consult  ,continue current management, O2 supply, anticoagulation plan as per cardiology consult
Source of bacteremia unclear   Prob skin source- pt c/o mild tenderness at old IV sites on left arm though no appreciable induration erythema or palpable cord  Has AICD Echo neg for endocarditis  C/o back pain  Had gallium scan ? cervical discitis Spoke to ortho team on a phone ,imaging reviewed -no bone pathology or discitis identified ,as per ortho team -no consult needed , CT neck done -no soft tissue infection identified ,cleared by ID for d/c ,d/w Dr Phan 02/18/22 ON IV ANCEF UNTIL 03/26/22   Unable to do MRI due to AICD  COVID 19- pt had been positive since 1/23/22.  Pt is vaccinated against COVID 19.
Admitted  to telemetry unit for monitoring , send 3 sets of cardiac enzymes to rule out acute coronary event, obtain ECHO to evaluate LVEF, cardiology consult  ,continue current management, O2 supply, anticoagulation plan as per cardiology consult
Source of bacteremia unclear   Prob skin source- pt c/o mild tenderness at old IV sites on left arm though no appreciable induration erythema or palpable cord  Has AICD Echo neg for endocarditis  C/o back pain  Had gallium scan ? cervical discitis Spoke to ortho team on a phone ,imaging reviewed -no bone pathology or discitis identified ,as per ortho team -no consult needed , CT neck done -no soft tissue infection identified ,cleared by ID for d/c ,d/w Dr Phan 02/18/22 ON IV ANCEF UNTIL 03/26/22   Unable to do MRI due to AICD  COVID 19- pt had been positive since 1/23/22.  Pt is vaccinated against COVID 19.

## 2022-02-25 NOTE — PROGRESS NOTE ADULT - PROBLEM SELECTOR PLAN 4
Cont Ancef 2grams q8 x 6 weeks 3/26/22  S/p RUE PICC line 2/18  Off Decadron   Diurese per cardiology  Trend temps and cbc  Monitor fever curve. If fever >101F, will have to repeat cx
Admit to monitored unit for cardiac monitoring, obtain echo to evaluate LVEF, intravenous diuresis as per card consult , monitor ins/outs, monitor renal profile and electrolytes closely ,send 3 sets of enzymes, O2 supply, serial chest xrays, monitor weights and oral intake of fluids, nutritionist consult
Admit to monitored unit for cardiac monitoring, obtain echo to evaluate LVEF, intravenous diuresis as per card consult , monitor ins/outs, monitor renal profile and electrolytes closely ,send 3 sets of enzymes, O2 supply, serial chest xrays, monitor weights and oral intake of fluids, nutritionist consult
S/P ACUTE ON CHRONIC EXACERBATION ,POA
Admit to monitored unit for cardiac monitoring, obtain echo to evaluate LVEF, intravenous diuresis as per card consult , monitor ins/outs, monitor renal profile and electrolytes closely ,send 3 sets of enzymes, O2 supply, serial chest xrays, monitor weights and oral intake of fluids, nutritionist consult
S/P ACUTE ON CHRONIC EXACERBATION ,POA
Cont Ancef 2grams q8 x 6 weeks 3/26/22  S/p RUE PICC line 2/18  Off Decadron   Diurese per cardiology  Trend temps and cbc  Monitor fever curve. If fever >101F, will have to repeat cx
S/P ACUTE ON CHRONIC EXACERBATION ,POA
Admit to monitored unit for cardiac monitoring, obtain echo to evaluate LVEF, intravenous diuresis as per card consult , monitor ins/outs, monitor renal profile and electrolytes closely ,send 3 sets of enzymes, O2 supply, serial chest xrays, monitor weights and oral intake of fluids, nutritionist consult
Cont Ancef 2grams q8 x 6 weeks 3/26/22  S/p RUE PICC line 2/18  Off Decadron   Diurese per cardiology  Trend temps and cbc  Monitor fever curve. If fever >101F, will have to repeat cx
Cont Ancef 2grams q8 x 6 weeks 3/26/22  S/p RUE PICC line 2/18  Off Decadron   Diurese per cardiology  Trend temps and cbc  Monitor fever curve. If fever >101F, will have to repeat cx

## 2022-02-25 NOTE — PROGRESS NOTE ADULT - PROBLEM SELECTOR PLAN 3
Staph Aureus bacteremia  Source of bacteremia unclear   Prob skin source- pt c/o mild tenderness at old IV sites on left arm though no appreciable induration erythema or palpable cordSp Vanc 1 gram x 1 dose  Start Ancef  Fu cultures  Repeat blood cultures
Staph Aureus bacteremia  Source of bacteremia unclear   Prob skin source- pt c/o mild tenderness at old IV sites on left arm though no appreciable induration erythema or palpable cordSp Vanc 1 gram x 1 dose  Start Ancef  Fu cultures  Repeat blood cultures
Monitor for fevers and hypoxia ,monitor  route of 02 and saturation closely ,trend CBC  AC plan as  per pulmonologist Dr Goins   Critical period for decompensation  (7-14 days post symptom onset), Continue avoid aerosolizing procedures, NSAIDs   -isolation precautions per facility protocol  -Try to avoid excessive blood draws, blood cultures and frequent CXRs  -monitor biomarkers- CBC w diff  for NLR <3 low vs >5 high) Ferritin (lower risk <450 vs >850) CRP (low risk <2 and higher risk >6) and LDH, D Dimer, procalcitonin serum levels   -antibiotics only if superimosed  bacterial process is suspected   -Steroids short course (usually  5 days)  --for hypoxia/ARDS /shock
Staph Aureus bacteremia  Source of bacteremia unclear   Prob skin source- pt c/o mild tenderness at old IV sites on left arm though no appreciable induration erythema or palpable cordSp Vanc 1 gram x 1 dose  Start Ancef  Fu cultures  Repeat blood cultures
Monitor for fevers and hypoxia ,monitor  route of 02 and saturation closely ,trend CBC  AC plan as  per pulmonologist Dr Goins   Critical period for decompensation  (7-14 days post symptom onset), Continue avoid aerosolizing procedures, NSAIDs   -isolation precautions per facility protocol  -Try to avoid excessive blood draws, blood cultures and frequent CXRs  -monitor biomarkers- CBC w diff  for NLR <3 low vs >5 high) Ferritin (lower risk <450 vs >850) CRP (low risk <2 and higher risk >6) and LDH, D Dimer, procalcitonin serum levels   -antibiotics only if superimosed  bacterial process is suspected   -Steroids short course (usually  5 days)  --for hypoxia/ARDS /shock
Source of bacteremia unclear   Prob skin source- pt c/o mild tenderness at old IV sites on left arm though no appreciable induration erythema or palpable cord  Has AICD Echo neg for endocarditis  C/o back pain  Had gallium scan ? cervical discitis Spoke to ortho team on a phone ,imaging reviewed -no bone pathology or discitis identified ,as per ortho team -no consult needed , CT neck done -no soft tissue infection identified ,cleared by ID for d/c ,d/w Dr Phan 02/18/22 ON IV ANCEF UNTIL 03/26/22   Unable to do MRI due to AICD  COVID 19- pt had been positive since 1/23/22.  Pt is vaccinated against COVID 19.
Staph Aureus bacteremia  Source of bacteremia unclear   Prob skin source- pt c/o mild tenderness at old IV sites on left arm though no appreciable induration erythema or palpable cordSp Vanc 1 gram x 1 dose  Start Ancef  Fu cultures  Repeat blood cultures
Monitor for fevers and hypoxia ,monitor  route of 02 and saturation closely ,trend CBC  AC plan as  per pulmonologist Dr Goins   Critical period for decompensation  (7-14 days post symptom onset), Continue avoid aerosolizing procedures, NSAIDs   -isolation precautions per facility protocol  -Try to avoid excessive blood draws, blood cultures and frequent CXRs  -monitor biomarkers- CBC w diff  for NLR <3 low vs >5 high) Ferritin (lower risk <450 vs >850) CRP (low risk <2 and higher risk >6) and LDH, D Dimer, procalcitonin serum levels   -antibiotics only if superimosed  bacterial process is suspected   -Steroids short course (usually  5 days)  --for hypoxia/ARDS /shock
Source of bacteremia unclear   Prob skin source- pt c/o mild tenderness at old IV sites on left arm though no appreciable induration erythema or palpable cord  Has AICD Echo neg for endocarditis  C/o back pain  Had gallium scan ? cervical discitis Spoke to ortho team on a phone ,imaging reviewed -no bone pathology or discitis identified ,as per ortho team -no consult needed , CT neck done -no soft tissue infection identified ,cleared by ID for d/c ,d/w Dr Phan 02/18/22 ON IV ANCEF UNTIL 03/26/22   Unable to do MRI due to AICD  COVID 19- pt had been positive since 1/23/22.  Pt is vaccinated against COVID 19.
Staph Aureus bacteremia  Source of bacteremia unclear   Prob skin source- pt c/o mild tenderness at old IV sites on left arm though no appreciable induration erythema or palpable cordSp Vanc 1 gram x 1 dose  Start Ancef  Fu cultures  Repeat blood cultures
Admitted for septic workup and evaluation,send blood and urine cx,serial lactate levels,monitor vitals closley,ivfs hydration,monitor urine output and renal profile,iv abx initiated
Staph Aureus bacteremia  Source of bacteremia unclear   Prob skin source- pt c/o mild tenderness at old IV sites on left arm though no appreciable induration erythema or palpable cordSp Vanc 1 gram x 1 dose  Start Ancef  Fu cultures  Repeat blood cultures
Source of bacteremia unclear   Prob skin source- pt c/o mild tenderness at old IV sites on left arm though no appreciable induration erythema or palpable cord  Has AICD Echo neg for endocarditis  C/o back pain  Had gallium scan ? cervical discitis Spoke to ortho team on a phone ,imaging reviewed -no bone pathology or discitis identified ,as per ortho team -no consult needed , CT neck done -no soft tissue infection identified ,cleared by ID for d/c ,d/w Dr Phan 02/18/22 ON IV ANCEF UNTIL 03/26/22   Unable to do MRI due to AICD  COVID 19- pt had been positive since 1/23/22.  Pt is vaccinated against COVID 19.
Monitor for fevers and hypoxia ,monitor  route of 02 and saturation closely ,trend CBC  AC plan as  per pulmonologist Dr Goins   Critical period for decompensation  (7-14 days post symptom onset), Continue avoid aerosolizing procedures, NSAIDs   -isolation precautions per facility protocol  -Try to avoid excessive blood draws, blood cultures and frequent CXRs  -monitor biomarkers- CBC w diff  for NLR <3 low vs >5 high) Ferritin (lower risk <450 vs >850) CRP (low risk <2 and higher risk >6) and LDH, D Dimer, procalcitonin serum levels   -antibiotics only if superimosed  bacterial process is suspected   -Steroids short course (usually  5 days)  --for hypoxia/ARDS /shock

## 2022-02-25 NOTE — PROGRESS NOTE ADULT - PROBLEM SELECTOR PROBLEM 2
2019 novel coronavirus disease (COVID-19)
Acute respiratory failure with hypoxia
2019 novel coronavirus disease (COVID-19)
Acute respiratory failure with hypoxia

## 2022-02-25 NOTE — PROGRESS NOTE ADULT - ASSESSMENT
BIPIN DE LA CRUZ 61 m 2/11/2022 1962 DR KELVIN VANESSA     REVIEW OF SYMPTOMS      Able to give (reliable) ROS  NO     PHYSICAL EXAM    HEENT Unremarkable  atraumatic   RESP Fair air entry EXP prolonged    Harsh breath sound Resp distres mild   CARDIAC S1 S2 No S3     NO JVD    ABDOMEN SOFT BS PRESENT NOT DISTENDED No hepatosplenomegaly   PEDAL EDEMA present No calf tenderness  NO rash       DOA/CC/PROBLEMS poa .  2/11/2022 61 m as per EMS, SOB (D/C'D from [facility] 2/1 for covid)  shortness of breath    PROBLEMS.    Resp failure poa 2/11/2022  HFNC 50% on 2/11/2022 2/16/2022 4l   2/17 32%  742/53/85   r Pl effs 2/11 cxr   COPD pmh    COVID (+) 2/11/2022   AICD  S aureus bacteremia mssa 2/11  Ancef 2/12/2022 Dr Phan    Lacticemia poa 2/11/2022 la 2.5   AOC CHF poa 2/11/2022 bnp 23029  pmh Dilated cmpthy  ECHO 11/20/2021 ef 20%  A fib (on eliquis) pmh   2/11/2022 apixaba 5.2       MISC ISSUES.                       COVID  STATUS.   scv2 2/11/2022 (+)         ICU STAY. none  GOC.  2/13/2022 full code     BEST PRACTICE ISSUES.                                                  HEAD OF BED ELEVATION. Yes  DVT PROPHYLAXIS.  2/11/2022 apixaba 5.2  a fib     WHITMORE PROPHYLAXIS.     2/11/2022 protonix 40                                                                                    DIET.   soft bite 1.2 l fr      ASSESSMENT/RECOMMENDATIONS.    HEMODYNAMICS.   Monitor bp Target MAP 65 (+)      PMH/PSH issues  were addressed.  Management continued/adjusted as indicated         RESP.   Monitor po Target po 90-95%    ABG.   2/11 50% hfnc 746/34/78    OXYGEN REQUIREMENTS.  2/21/2022 3l 91%      PROSTH/TUBES/LINES.  poa AICD     INFECTION.  Staph aureus bacteremia 2/11  T 2/21/2022 T 100f   W 2/11-2/12-2/13-2/15/2022 w 17-15 - 13 - 9.7   pr 2/12-2/24/2022 24 - .9   ECHO 2/13/2022 no endocarditis  cxr 2/11/2022 cxr nsc 1/14/2022   nsc mod r pl effsn or basal airspace disease   cm  Flu AB 2/11/2022 (-)   RSV 2/11/2022 (-)  blod c 2/23 (-)  blod c 2/14 (-)  blod c 2/12 staph a   blod c 2/11 mssa   abio 2/12/2022 ancef 1.3 Dr Garcia sa 6 w 3/26    CARDIAC.  HEMODYNAMICS.  Lacticemia poa 2/11/2022   la 2/11/2022 la 2.  AOC CHF poa 2/11/2022  cr 2/16/2022 cr 1.1   ECHO 2/13/2022 no endocarditis severe lvsd mod mr mild ph pa 42   ECHO 11/20/2021 ef 20% deidra rve all severely enlargd pasp 37 mod to severe mr   bnp 2/11-2/12/2022 bnp 17413 - 93129   cxr 2/11/2022 cxr nsc 1/14/2022   nsc mod r pl effsn or basal airspace disease   cm  2/11/2022 bumetanide 2.2 iv   2/14/2022 sacubitril 24 valsartan 25 bid   2/14/2022 spironolact 25   A fib   2/11/2022 apixaba 5.2  2/11/2022 amiodarine  200   2/12/2022 metoprolol 25   CAD  2/11/2022 ekg nsr t wave abd consider lateral ischemia   2/11/2022 ASA 81   2/11/2022 atorvastat 40  A/R  Rx CHF   monitor k   Cont amiodarone apixaban   Cardio on case       TIME SPENT   Over 25 minutes aggregate care time spent on encounter; activities included   direct patient care, counseling and/or coordinating care reviewing notes, lab data/ imaging , discussion with multidisciplinary team/ patient  /family and explaining in detail risks, benefits, alternatives  of the recommendations     BIPIN DE LA CRUZ 61 m 2/11/2022 1962 DR KELVIN VANESSA

## 2022-02-25 NOTE — PROGRESS NOTE ADULT - PROBLEM SELECTOR PLAN 1
oxygen supply as per pulm Dr Goins ,serial abgs ,chest xrays ,tele monitoring
Cont Ancef 2grams q8 x 6 weeks 3/26/22  S/p RUE PICC line 2/18  Off Decadron   Diurese per cardiology  Trend temps and cbc  Monitor fever curve. If fever >101F, will have to repeat cx
oxygen supply as per pulm Dr Goins ,serial abgs ,chest xrays ,tele monitoring
Cont Ancef 2grams q8 x 6 weeks 3/26/22  S/p RUE PICC line 2/18 ,looks ok   CT NECK SOFT TISSUES -no  collection ,has sinusitis   Trend temps and cbc  Monitor fever curve. If fever >101F, will have to repeat cx
Cont Ancef 2grams q8 x 6 weeks 3/26/22  S/p RUE PICC line 2/18 ,looks ok   CT NECK SOFT TISSUES ordered to rule out collection ( after nephrology clearance obtained ,with iv hydration before and after ) POC d/w    Trend temps and cbc  Monitor fever curve. If fever >101F, will have to repeat cx
Cont Ancef 2grams q8 x 6 weeks 3/26/22  S/p RUE PICC line 2/18 ,looks ok   CT NECK SOFT TISSUES -no  collection ,has sinusitis   Trend temps and cbc  Monitor fever curve. If fever >101F, will have to repeat cx

## 2022-02-25 NOTE — PROGRESS NOTE ADULT - PROVIDER SPECIALTY LIST ADULT
Addiction Medicine
Cardiology
Infectious Disease
Nephrology
Nephrology
Pulmonology
Pulmonology
Addiction Medicine
Cardiology
Infectious Disease
Nephrology
Pulmonology
Addiction Medicine
Cardiology
Hospitalist
Infectious Disease
Nephrology
Pulmonology
Addiction Medicine
Addiction Medicine
Cardiology
Infectious Disease
Nephrology
Addiction Medicine
Nephrology
Nephrology
Internal Medicine
Hospitalist

## 2022-02-25 NOTE — PROGRESS NOTE ADULT - ASSESSMENT
61 yr old male ,known to me from [facility]  with med hx of  CAD, dilated cardiomyopathy, s/p ICD, atrial flutter on Eliquis, substance abuse was admitted initially from 12/24 for decompensated CHF, was on Milrinone and Bumex drips, left AMA on 12/31 and returned to the ED later in the day as he had to take care of personal matter. He reported shortness of breath and right arm and foot pain on presentation. Cardiology and Heart Failure specialist were consulted, he was placed on oral Bumex., subsequently transitioned to Bumex gtt. Bumex drip stopped on 1/12. Stopped  Milrinone 1/14 transitioned to oral Torsemide , metoprolol . Counseling given re diet and medication compliance .Not candidate for advanced therapies due to smoking/cocaine use history and non-compliance. Patient reported he was recently evicted from his apartment and is homeless, social work consult placed. Seen by PT and recommend  [facility]. The patient was medically stable for discharge.  Subsequently pt developed COVID-19 infection was admitted to [facility] and discharged a few days ago, now returns to ER from rehab  because of increasing sob and fevers .Found to have positive COVID test ,seen by ID & pulm consult requested Palliative care consult requested ,to discuss advance directives and complete MOL  (11 Feb 2022 13:41)      ACUTE RENAL FAILURE:   Serum creatinine is  at 1.2    , approximating GFR decreased .   There is no progression . No uremic symptoms  No evidence of anemia .  Fluid status stable.  Will continue to avoid nephrotoxic drugs.  Patient remains asymptomatic.   Continue current therapy.  sacubitril 24 mG/valsartan 26 mG 1 Tablet(s) Oral two times a day  spironolactone 25 milliGRAM(s) Oral daily    BP monitoring,continue current antihypertensive meds, low salt diet,followup with PMD in 1-2 weeks  metoprolol succinate ER 25 milliGRAM(s) Oral daily    Admit for septic workup and ID evaluation,send blood and urine cx,serial lactate levels,monitor vitals closley,ivfs hydration,monitor urine output and renal profile,iv abx as per id cons  ceFAZolin   IVPB 2000 milliGRAM(s) IV Intermittent every 8 hours

## 2022-02-25 NOTE — PROGRESS NOTE ADULT - PROBLEM SELECTOR PLAN 6
Admit for iv hydration,monitor renal profile and urine output,nutritionist consult,prealbumin level,serial bmp,nutritional supplements,multivitamins,palliative care evaluuation regarding MOLST completion and artificial nutrition discussion
S/P ACUTE ON CHRONIC EXACERBATION ,POA
Admit for iv hydration,monitor renal profile and urine output,nutritionist consult,prealbumin level,serial bmp,nutritional supplements,multivitamins,palliative care evaluuation regarding MOLST completion and artificial nutrition discussion
S/P ACUTE ON CHRONIC EXACERBATION ,POA
S/P ACUTE ON CHRONIC EXACERBATION ,POA
Admit for iv hydration,monitor renal profile and urine output,nutritionist consult,prealbumin level,serial bmp,nutritional supplements,multivitamins,palliative care evaluuation regarding MOLST completion and artificial nutrition discussion
S/P ACUTE ON CHRONIC EXACERBATION ,POA

## 2022-02-25 NOTE — PROGRESS NOTE ADULT - REASON FOR ADMISSION
SOB

## 2022-02-25 NOTE — PROGRESS NOTE ADULT - PROBLEM SELECTOR PROBLEM 7
CHF with cardiomyopathy
Hyponatremia
Hyponatremia
CHF with cardiomyopathy
Hyponatremia
CHF with cardiomyopathy
CHF with cardiomyopathy
Hyponatremia

## 2022-02-25 NOTE — PROGRESS NOTE ADULT - PROBLEM/PLAN-4
DISPLAY PLAN FREE TEXT
[phone]

## 2022-02-25 NOTE — PROGRESS NOTE ADULT - PROBLEM SELECTOR PROBLEM 5
Bacteremia
CHF with cardiomyopathy
Bacteremia
CHF with cardiomyopathy
Bacteremia
CHF with cardiomyopathy
Bacteremia

## 2022-02-25 NOTE — PROGRESS NOTE ADULT - ASSESSMENT
The patient is a 61 year old male with a history of CAD, chronic systolic heart failure s/p ICD, atrial flutter s/p DCCV, substance abuse, CKD who is admitted with acute on chronic systolic heart failure, COVID.    2/25/22  Seen at [facility] telemetry  Awake and alert  Sitting semi-erect  Complaining of some shortness of breath    Plan:  - Echo 2/12/22 with severely reduced LV and RV function, mod MR, mod TR  - Continue metoprolol succinate 25 mg daily  - Continue spironolactone 25 mg daily  - BPs slightly higher - resume Entresto 24-26 mg bid  - Continue bumetanide 2 mg PO bid  - Continue amiodarone 200 mg daily  - Continue apixaban 5 mg bid  - Continue aspirin 81 mg daily  - Continue atorvastatin 40 mg daily  - Staph bacteremia - on cefazolin. TTE with no obvious endocarditis. PICC placed. Recurrent fevers - if VANESSA needed for further work-up, recommend transfer to [facility], where the patient is well known to their cardiology service.  - ID follow-up

## 2022-02-25 NOTE — PROGRESS NOTE ADULT - ASSESSMENT
61 year old male with a history of CAD, end stage chronic systolic heart failure s/p ICD, atrial flutter s/p DCCV, substance abuse, CKD COVID 19 1/23/22 admitted with Acute hypoxic respiratory failure likely sec CHF exacerbation Markedly elevated BNP. S/p HFNC.   Staph Aureus bacteremia -source of bacteremia unclear   Prob skin source- pt c/o mild tenderness at old IV sites on left arm though no appreciable induration erythema or palpable cord  Has AICD Echo neg for endocarditis  C/o back pain  Had gallium scan ? cervical discitis  Unable to do MRI due to AICD  COVID 19- pt had been positive since 1/23/22.  Pt is vaccinated against COVID 19.  COVID negative as 2/14 2/22 febrile Tm 102.1F, asymptomatic, no new complaints or exam findings, PICC line looks ok  2/23 - afebrile x24h  2/24 - afebrile, CT neck reviewed as above with multiple carious teeth, no rim enhancing fluid collection.   Repeat cultures negative to date    Plan :  2/14 Repeat blood cultures negative  Repeat blood cultures NGTD  S/p RUE PICC line 2/18  Cont Ancef 2grams q8 x 6 weeks 3/26/22  Off Decadron   Cardiology following  Consider dental evaluation  Trend temps and cbc  Discharge planning per primary team      Indu Rivas M.D.  [facility], Division of Infectious Diseases  [phone]  After 5pm on weekdays and all day on weekends - please call [phone]

## 2022-02-25 NOTE — DISCHARGE NOTE NURSING/CASE MANAGEMENT/SOCIAL WORK - NSDCPEFALRISK_GEN_ALL_CORE
For information on Fall & Injury Prevention, visit: [email or web address] OR  [email or web address] OR  https://www.cdc.gov/steadi/patient.html

## 2022-02-25 NOTE — PROGRESS NOTE ADULT - ASSESSMENT
61 yr old male with CAD, dilated cardiomyopathy, s/p ICD, atrial flutter on Eliquis, substance abuse was admitted initially from 12/24 for decompensated CHF, was on Milrinone and Bumex drips, left AMA on 12/31 and returned to the ED later in the day as he had to take care of personal matter. He reported shortness of breath and right arm and foot pain on presentation. Cardiology and Heart Failure specialist were consulted, he was placed on oral Bumex., subsequently transitioned to Bumex gtt. Bumex drip stopped on 1/12. Stopped  Milrinone 1/14 transitioned to oral Torsemide , metoprolol . Counseling given re diet and medication compliance .Not candidate for advanced therapies due to smoking/cocaine use history and non-compliance. Patient reported he was recently evicted from his apartment and is homeless, social work consult placed. Seen by PT and recommend  [facility]. The patient was medically stable for discharge.  Subsequently pt developed COVID-19 infection was admitted to [facility] and discharged a few days ago, now returns to ER from rehab  because of increasing sob and fevers .Found to have positive COVID test ,seen by ID & pulm consult requested Palliative care consult requested ,to discuss advance directives and complete MOLST  Cont Ancef 2grams q8 x 6 weeks 3/26/22  S/p RUE PICC line 2/18  Off Decadron   Diurese per cardiology  Trend temps and cbc  Monitor fever curve. If fever >101F, will have to repeat cx

## 2022-02-25 NOTE — PROGRESS NOTE ADULT - PROBLEM SELECTOR PLAN 2
Monitor for fevers and hypoxia ,monitor  route of 02 and saturation closely ,trend CBC  AC plan as  per pulmonologist Dr Goins   Critical period for decompensation  (7-14 days post symptom onset), Continue avoid aerosolizing procedures, NSAIDs   -isolation precautions per facility protocol  -Try to avoid excessive blood draws, blood cultures and frequent CXRs  -monitor biomarkers- CBC w diff  for NLR <3 low vs >5 high) Ferritin (lower risk <450 vs >850) CRP (low risk <2 and higher risk >6) and LDH, D Dimer, procalcitonin serum levels   -antibiotics only if superimosed  bacterial process is suspected   -Steroids short course (usually  5 days)  --for hypoxia/ARDS /shock
oxygen supply as per pulm Dr Goins ,serial abgs ,chest xrays ,tele monitoring
oxygen supply as per pulm Dr Goins ,serial abgs ,chest xrays ,tele monitoring
Monitor for fevers and hypoxia ,monitor  route of 02 and saturation closely ,trend CBC  AC plan as  per pulmonologist Dr Goins   Critical period for decompensation  (7-14 days post symptom onset), Continue avoid aerosolizing procedures, NSAIDs   -isolation precautions per facility protocol  -Try to avoid excessive blood draws, blood cultures and frequent CXRs  -monitor biomarkers- CBC w diff  for NLR <3 low vs >5 high) Ferritin (lower risk <450 vs >850) CRP (low risk <2 and higher risk >6) and LDH, D Dimer, procalcitonin serum levels   -antibiotics only if superimosed  bacterial process is suspected   -Steroids short course (usually  5 days)  --for hypoxia/ARDS /shock
oxygen supply as per pulm Dr Goins ,serial abgs ,chest xrays ,tele monitoring
Monitor for fevers and hypoxia ,monitor  route of 02 and saturation closely ,trend CBC  AC plan as  per pulmonologist Dr Goins   Critical period for decompensation  (7-14 days post symptom onset), Continue avoid aerosolizing procedures, NSAIDs   -isolation precautions per facility protocol  -Try to avoid excessive blood draws, blood cultures and frequent CXRs  -monitor biomarkers- CBC w diff  for NLR <3 low vs >5 high) Ferritin (lower risk <450 vs >850) CRP (low risk <2 and higher risk >6) and LDH, D Dimer, procalcitonin serum levels   -antibiotics only if superimosed  bacterial process is suspected   -Steroids short course (usually  5 days)  --for hypoxia/ARDS /shock
oxygen supply as per pulm Dr Goins ,serial abgs ,chest xrays ,tele monitoring

## 2022-02-25 NOTE — PROGRESS NOTE ADULT - PROBLEM SELECTOR PLAN 7
serial bmp ,ins/outs ,nephrology consult f/up
Admitted  to telemetry unit for monitoring , send 3 sets of cardiac enzymes to rule out acute coronary event, obtain ECHO to evaluate LVEF, cardiology consult  ,continue current management, O2 supply, anticoagulation plan as per cardiology consult
serial bmp ,ins/outs ,nephrology consult f/up
Admitted  to telemetry unit for monitoring , send 3 sets of cardiac enzymes to rule out acute coronary event, obtain ECHO to evaluate LVEF, cardiology consult  ,continue current management, O2 supply, anticoagulation plan as per cardiology consult
serial bmp ,ins/outs ,nephrology consult f/up
Admitted  to telemetry unit for monitoring , send 3 sets of cardiac enzymes to rule out acute coronary event, obtain ECHO to evaluate LVEF, cardiology consult  ,continue current management, O2 supply, anticoagulation plan as per cardiology consult
serial bmp ,ins/outs ,nephrology consult f/up
Admitted  to telemetry unit for monitoring , send 3 sets of cardiac enzymes to rule out acute coronary event, obtain ECHO to evaluate LVEF, cardiology consult  ,continue current management, O2 supply, anticoagulation plan as per cardiology consult

## 2022-02-25 NOTE — PROGRESS NOTE ADULT - PROBLEM SELECTOR PROBLEM 6
Chronic systolic congestive heart failure
DERRICK (acute kidney injury)
Chronic systolic congestive heart failure
DERRICK (acute kidney injury)
Chronic systolic congestive heart failure
DERRICK (acute kidney injury)
DERRICK (acute kidney injury)
Chronic systolic congestive heart failure

## 2022-02-25 NOTE — PROGRESS NOTE ADULT - SUBJECTIVE AND OBJECTIVE BOX
DOROTHY MCKEONSON    [facility] TELE 314 D1    Allergies    No Known Allergies    Intolerances    pork (Other)      PAST MEDICAL & SURGICAL HISTORY:  Heart failure, systolic    CAD (coronary artery disease)    Hypertension    Nonischemic cardiomyopathy    COPD, moderate    2019 novel coronavirus disease (COVID-19)    Substance abuse    HLD (hyperlipidemia)    Cardiac LV ejection fraction 10-20%    AICD (automatic cardioverter/defibrillator) present    Cardiac LV ejection fraction 10-20%        FAMILY HISTORY:  FH: lung cancer        Home Medications:  acetaminophen 325 mg oral tablet: 2 tab(s) orally every 6 hours, As needed, Temp greater or equal to 38C (100.4F), Mild Pain (1 - 3) (11 Feb 2022 13:52)  Aquaphor Healing topical ointment: Apply topically to affected area once a day (11 Feb 2022 13:52)  Bacid (LAC) oral capsule: 2 cap(s) orally once a day (11 Feb 2022 13:52)  DuoNeb 0.5 mg-2.5 mg/3 mL inhalation solution: 3 milliliter(s) inhaled 4 times a day, As Needed (11 Feb 2022 13:52)  furosemide 20 mg oral tablet: 1 tab(s) orally once a day (11 Feb 2022 13:52)  gabapentin 100 mg oral capsule: 1 cap(s) orally 3 times a day (11 Feb 2022 13:52)  melatonin 3 mg oral tablet: 1 tab(s) orally once a day (at bedtime), As needed, Insomnia (11 Feb 2022 13:52)  metoprolol succinate 100 mg oral tablet, extended release: 1 tab(s) orally once a day (11 Feb 2022 13:52)  simethicone 80 mg oral tablet: 2 tab(s) orally every 6 hours, As Needed (11 Feb 2022 13:52)  Symbicort 160 mcg-4.5 mcg/inh inhalation aerosol: 2 puff(s) inhaled 2 times a day (11 Feb 2022 13:52)  Ventolin HFA 90 mcg/inh inhalation aerosol: 2 puff(s) inhaled 3 times a day for 5 days. Start: 02/10/22 (11 Feb 2022 13:52)      MEDICATIONS  (STANDING):  aMIOdarone    Tablet 200 milliGRAM(s) Oral daily  apixaban 5 milliGRAM(s) Oral every 12 hours  aspirin enteric coated 81 milliGRAM(s) Oral daily  atorvastatin 40 milliGRAM(s) Oral at bedtime  buDESOnide    Inhalation Suspension 0.5 milliGRAM(s) Inhalation two times a day  buMETAnide Injectable 2 milliGRAM(s) IV Push every 12 hours  dexAMETHasone     Tablet 6 milliGRAM(s) Oral daily  gabapentin 100 milliGRAM(s) Oral three times a day  lactobacillus acidophilus 1 Tablet(s) Oral two times a day  pantoprazole    Tablet 40 milliGRAM(s) Oral before breakfast    MEDICATIONS  (PRN):  acetaminophen     Tablet .. 650 milliGRAM(s) Oral every 6 hours PRN Temp greater or equal to 38C (100.4F), Mild Pain (1 - 3)  aluminum hydroxide/magnesium hydroxide/simethicone Suspension 30 milliLiter(s) Oral every 4 hours PRN Dyspepsia  melatonin 3 milliGRAM(s) Oral at bedtime PRN Insomnia  ondansetron Injectable 4 milliGRAM(s) IV Push every 8 hours PRN Nausea and/or Vomiting  simethicone 80 milliGRAM(s) Chew three times a day PRN Indigestion              Vital Signs Last 24 Hrs  T(C): 37.1 (12 Feb 2022 06:28), Max: 37.6 (11 Feb 2022 21:23)  T(F): 98.7 (12 Feb 2022 06:28), Max: 99.6 (11 Feb 2022 21:23)  HR: 83 (12 Feb 2022 08:18) (73 - 101)  BP: 146/81 (12 Feb 2022 06:28) (102/68 - 146/81)  BP(mean): --  RR: 19 (12 Feb 2022 06:28) (17 - 20)  SpO2: 96% (12 Feb 2022 08:18) (93% - 100%)      02-11-22 @ 07:01  -  02-12-22 @ 07:00  --------------------------------------------------------  IN: 0 mL / OUT: 200 mL / NET: -200 mL              LABS:                        13.6   15.77 )-----------( 173      ( 12 Feb 2022 05:48 )             41.2     02-12    133<L>  |  99  |  58<H>  ----------------------------<  116<H>  5.0   |  28  |  1.60<H>    Ca    8.4<L>      12 Feb 2022 05:48  Phos  4.1     02-12  Mg     2.6     02-12    TPro  5.6<L>  /  Alb  2.3<L>  /  TBili  2.9<H>  /  DBili  x   /  AST  34  /  ALT  42  /  AlkPhos  293<H>  02-12    PT/INR - ( 12 Feb 2022 05:48 )   PT: 30.0 sec;   INR: 2.69 ratio         PTT - ( 11 Feb 2022 10:10 )  PTT:38.7 sec      ABG - ( 11 Feb 2022 20:45 )  pH, Arterial: 7.46  pH, Blood: x     /  pCO2: 34    /  pO2: 78    / HCO3: 24    / Base Excess: 0.4   /  SaO2: 97.3                WBC:  WBC Count: 15.77 K/uL (02-12 @ 05:48)  WBC Count: 17.56 K/uL (02-11 @ 10:10)      MICROBIOLOGY:  RECENT CULTURES:  02-11 .Blood Blood-Peripheral Blood Culture PCR   Growth in aerobic bottle: Gram Positive Cocci in Clusters  Growth in anaerobic bottle: Gram Positive Cocci in Clusters   Growth in aerobic bottle: Gram Positive Cocci in Clusters  Growth in anaerobic bottle: Gram Positive Cocci in Clusters                PT/INR - ( 12 Feb 2022 05:48 )   PT: 30.0 sec;   INR: 2.69 ratio         PTT - ( 11 Feb 2022 10:10 )  PTT:38.7 sec    Sodium:  Sodium, Serum: 133 mmol/L (02-12 @ 05:48)  Sodium, Serum: 129 mmol/L (02-11 @ 10:10)      1.60 mg/dL 02-12 @ 05:48  1.60 mg/dL 02-11 @ 10:10      Hemoglobin:  Hemoglobin: 13.6 g/dL (02-12 @ 05:48)  Hemoglobin: 14.5 g/dL (02-11 @ 10:10)      Platelets: Platelet Count - Automated: 173 K/uL (02-12 @ 05:48)  Platelet Count - Automated: 204 K/uL (02-11 @ 10:10)      LIVER FUNCTIONS - ( 12 Feb 2022 05:48 )  Alb: 2.3 g/dL / Pro: 5.6 g/dL / ALK PHOS: 293 U/L / ALT: 42 U/L / AST: 34 U/L / GGT: x                 RADIOLOGY & ADDITIONAL STUDIES:      MICROBIOLOGY:  RECENT CULTURES:  02-11 .Blood Blood-Peripheral Blood Culture PCR   Growth in aerobic bottle: Gram Positive Cocci in Clusters  Growth in anaerobic bottle: Gram Positive Cocci in Clusters   Growth in aerobic bottle: Gram Positive Cocci in Clusters  Growth in anaerobic bottle: Gram Positive Cocci in Clusters            
    DOROTHY MCKEONSON    [facility] TELE 314 D1    Allergies    No Known Allergies    Intolerances    pork (Other)      PAST MEDICAL & SURGICAL HISTORY:  Heart failure, systolic    CAD (coronary artery disease)    Hypertension    Nonischemic cardiomyopathy    COPD, moderate    2019 novel coronavirus disease (COVID-19)    Substance abuse    HLD (hyperlipidemia)    Cardiac LV ejection fraction 10-20%    AICD (automatic cardioverter/defibrillator) present    Cardiac LV ejection fraction 10-20%        FAMILY HISTORY:  FH: lung cancer        Home Medications:  acetaminophen 325 mg oral tablet: 2 tab(s) orally every 6 hours, As needed, Temp greater or equal to 38C (100.4F), Mild Pain (1 - 3) (13 Feb 2022 10:49)  Aquaphor Healing topical ointment: Apply topically to affected area once a day (13 Feb 2022 10:49)  Bacid (LAC) oral capsule: 2 cap(s) orally once a day (13 Feb 2022 10:49)  DuoNeb 0.5 mg-2.5 mg/3 mL inhalation solution: 3 milliliter(s) inhaled 4 times a day, As Needed (13 Feb 2022 10:49)  furosemide 20 mg oral tablet: 1 tab(s) orally once a day (13 Feb 2022 10:49)  gabapentin 100 mg oral capsule: 1 cap(s) orally 3 times a day (13 Feb 2022 10:49)  melatonin 3 mg oral tablet: 1 tab(s) orally once a day (at bedtime), As needed, Insomnia (13 Feb 2022 10:49)  metoprolol succinate 100 mg oral tablet, extended release: 1 tab(s) orally once a day (13 Feb 2022 10:49)  simethicone 80 mg oral tablet: 2 tab(s) orally every 6 hours, As Needed (13 Feb 2022 10:49)  Symbicort 160 mcg-4.5 mcg/inh inhalation aerosol: 2 puff(s) inhaled 2 times a day (13 Feb 2022 10:49)  Ventolin HFA 90 mcg/inh inhalation aerosol: 2 puff(s) inhaled 3 times a day for 5 days. Start: 02/10/22 (13 Feb 2022 10:49)  Ventolin HFA 90 mcg/inh inhalation aerosol: 2 puff(s) inhaled every 6 hours, As Needed (13 Feb 2022 10:49)      MEDICATIONS  (STANDING):  aMIOdarone    Tablet 200 milliGRAM(s) Oral daily  apixaban 5 milliGRAM(s) Oral every 12 hours  aspirin enteric coated 81 milliGRAM(s) Oral daily  atorvastatin 40 milliGRAM(s) Oral at bedtime  buDESOnide    Inhalation Suspension 0.5 milliGRAM(s) Inhalation two times a day  buMETAnide Injectable 2 milliGRAM(s) IV Push every 12 hours  ceFAZolin   IVPB 2000 milliGRAM(s) IV Intermittent every 8 hours  dexAMETHasone     Tablet 6 milliGRAM(s) Oral daily  gabapentin 100 milliGRAM(s) Oral three times a day  lactobacillus acidophilus 1 Tablet(s) Oral two times a day  metoprolol succinate ER 25 milliGRAM(s) Oral daily  pantoprazole    Tablet 40 milliGRAM(s) Oral before breakfast    MEDICATIONS  (PRN):  acetaminophen     Tablet .. 650 milliGRAM(s) Oral every 6 hours PRN Temp greater or equal to 38C (100.4F), Mild Pain (1 - 3)  aluminum hydroxide/magnesium hydroxide/simethicone Suspension 30 milliLiter(s) Oral every 4 hours PRN Dyspepsia  melatonin 3 milliGRAM(s) Oral at bedtime PRN Insomnia  ondansetron Injectable 4 milliGRAM(s) IV Push every 8 hours PRN Nausea and/or Vomiting  simethicone 80 milliGRAM(s) Chew three times a day PRN Indigestion      Diet, Consistent Carbohydrate w/Evening Snack:   Soft and Bite Sized (SOFTBTSZ)  Low Sodium  Supplement Feeding Modality:  Oral  Glucerna Shake Cans or Servings Per Day:  1       Frequency:  Two Times a day (02-12-22 @ 11:55) [Active]          Vital Signs Last 24 Hrs  T(C): 36.4 (14 Feb 2022 06:06), Max: 37.1 (13 Feb 2022 19:06)  T(F): 97.5 (14 Feb 2022 06:06), Max: 98.7 (13 Feb 2022 19:06)  HR: 66 (14 Feb 2022 09:03) (63 - 71)  BP: 111/77 (14 Feb 2022 06:06) (111/77 - 125/88)  BP(mean): --  RR: 18 (14 Feb 2022 06:06) (18 - 21)  SpO2: 96% (14 Feb 2022 09:03) (96% - 100%)      02-13-22 @ 07:01  -  02-14-22 @ 07:00  --------------------------------------------------------  IN: 360 mL / OUT: 1750 mL / NET: -1390 mL              LABS:                        13.7   9.75  )-----------( 180      ( 14 Feb 2022 07:48 )             44.3     02-14    135  |  98  |  71<H>  ----------------------------<  130<H>  4.8   |  32<H>  |  1.40<H>    Ca    9.0      14 Feb 2022 07:48                WBC:  WBC Count: 9.75 K/uL (02-14 @ 07:48)  WBC Count: 11.78 K/uL (02-13 @ 08:21)  WBC Count: 15.77 K/uL (02-12 @ 05:48)  WBC Count: 17.56 K/uL (02-11 @ 10:10)      MICROBIOLOGY:  RECENT CULTURES:  02-12 .Blood Blood-Peripheral XXXX   Growth in aerobic bottle: Gram Positive Cocci in Clusters  Growth in anaerobic bottle: Gram Positive Cocci in Clusters   Growth in aerobic and anaerobic bottles: Staphylococcus aureus  See previous culture [id number]    02-11 .Blood Blood-Peripheral Blood Culture PCR  Staphylococcus aureus   Growth in aerobic bottle: Gram Positive Cocci in Clusters  Growth in anaerobic bottle: Gram Positive Cocci in Clusters   Growth in aerobic and anaerobic bottles: Staphylococcus aureus  See previous culture [id number]                    Sodium:  Sodium, Serum: 135 mmol/L (02-14 @ 07:48)  Sodium, Serum: 134 mmol/L (02-13 @ 08:21)  Sodium, Serum: 133 mmol/L (02-12 @ 05:48)  Sodium, Serum: 129 mmol/L (02-11 @ 10:10)      1.40 mg/dL 02-14 @ 07:48  1.50 mg/dL 02-13 @ 08:21  1.60 mg/dL 02-12 @ 05:48  1.60 mg/dL 02-11 @ 10:10      Hemoglobin:  Hemoglobin: 13.7 g/dL (02-14 @ 07:48)  Hemoglobin: 12.7 g/dL (02-13 @ 08:21)  Hemoglobin: 13.6 g/dL (02-12 @ 05:48)  Hemoglobin: 14.5 g/dL (02-11 @ 10:10)      Platelets: Platelet Count - Automated: 180 K/uL (02-14 @ 07:48)  Platelet Count - Automated: 175 K/uL (02-13 @ 08:21)  Platelet Count - Automated: 173 K/uL (02-12 @ 05:48)  Platelet Count - Automated: 204 K/uL (02-11 @ 10:10)              RADIOLOGY & ADDITIONAL STUDIES:      MICROBIOLOGY:  RECENT CULTURES:  02-12 .Blood Blood-Peripheral XXXX   Growth in aerobic bottle: Gram Positive Cocci in Clusters  Growth in anaerobic bottle: Gram Positive Cocci in Clusters   Growth in aerobic and anaerobic bottles: Staphylococcus aureus  See previous culture [id number]    02-11 .Blood Blood-Peripheral Blood Culture PCR  Staphylococcus aureus   Growth in aerobic bottle: Gram Positive Cocci in Clusters  Growth in anaerobic bottle: Gram Positive Cocci in Clusters   Growth in aerobic and anaerobic bottles: Staphylococcus aureus  See previous culture [id number]            
    DOROTHY VOSS    [facility] TELE 304 W1    Allergies    No Known Allergies    Intolerances    pork (Other)      PAST MEDICAL & SURGICAL HISTORY:  Heart failure, systolic    CAD (coronary artery disease)    Hypertension    Nonischemic cardiomyopathy    COPD, moderate    2019 novel coronavirus disease (COVID-19)    Substance abuse    HLD (hyperlipidemia)    Cardiac LV ejection fraction 10-20%    AICD (automatic cardioverter/defibrillator) present    Cardiac LV ejection fraction 10-20%        FAMILY HISTORY:  FH: lung cancer        Home Medications:  acetaminophen 325 mg oral tablet: 2 tab(s) orally every 6 hours, As needed, Temp greater or equal to 38C (100.4F), Mild Pain (1 - 3) (13 Feb 2022 10:49)  Aquaphor Healing topical ointment: Apply topically to affected area once a day (13 Feb 2022 10:49)  ascorbic acid 500 mg oral tablet: 1 tab(s) orally once a day (19 Feb 2022 13:16)  Bacid (LAC) oral capsule: 2 cap(s) orally once a day (13 Feb 2022 10:49)  bumetanide 2 mg oral tablet: 1 tab(s) orally 2 times a day (19 Feb 2022 13:16)  ceFAZolin: 2 gram(s) intravenous every 8 hours ,last day 03/26/22 (19 Feb 2022 13:16)  gabapentin 100 mg oral capsule: 1 cap(s) orally 3 times a day (13 Feb 2022 10:49)  melatonin 3 mg oral tablet: 1 tab(s) orally once a day (at bedtime), As needed, Insomnia (13 Feb 2022 10:49)  metoprolol succinate 25 mg oral tablet, extended release: 1 tab(s) orally once a day (19 Feb 2022 13:16)  Multiple Vitamins with Minerals oral tablet: 1 tab(s) orally once a day (19 Feb 2022 13:16)  pantoprazole 40 mg oral delayed release tablet: 1 tab(s) orally once a day (before a meal) (19 Feb 2022 13:16)  simethicone 80 mg oral tablet: 2 tab(s) orally every 6 hours, As Needed (13 Feb 2022 10:49)  spironolactone 25 mg oral tablet: 1 tab(s) orally once a day (19 Feb 2022 13:16)  Symbicort 160 mcg-4.5 mcg/inh inhalation aerosol: 2 puff(s) inhaled 2 times a day (13 Feb 2022 10:49)  Ventolin HFA 90 mcg/inh inhalation aerosol: 2 puff(s) inhaled every 6 hours, As Needed (13 Feb 2022 10:49)      MEDICATIONS  (STANDING):  aMIOdarone    Tablet 200 milliGRAM(s) Oral daily  apixaban 5 milliGRAM(s) Oral every 12 hours  aspirin enteric coated 81 milliGRAM(s) Oral daily  atorvastatin 40 milliGRAM(s) Oral at bedtime  buMETAnide 2 milliGRAM(s) Oral two times a day  ceFAZolin   IVPB 2000 milliGRAM(s) IV Intermittent every 8 hours  chlorhexidine 4% Liquid 1 Application(s) Topical <User Schedule>  gabapentin 100 milliGRAM(s) Oral three times a day  lactobacillus acidophilus 1 Tablet(s) Oral two times a day  lidocaine   4% Patch 1 Patch Transdermal daily  metoprolol succinate ER 25 milliGRAM(s) Oral daily  midodrine. 5 milliGRAM(s) Oral once  pantoprazole    Tablet 40 milliGRAM(s) Oral before breakfast  sacubitril 24 mG/valsartan 26 mG 1 Tablet(s) Oral two times a day  spironolactone 25 milliGRAM(s) Oral daily    MEDICATIONS  (PRN):  acetaminophen     Tablet .. 650 milliGRAM(s) Oral every 6 hours PRN Temp greater or equal to 38C (100.4F), Mild Pain (1 - 3)  ALBUTerol    90 MICROgram(s) HFA Inhaler 2 Puff(s) Inhalation every 6 hours PRN Shortness of Breath and/or Wheezing  aluminum hydroxide/magnesium hydroxide/simethicone Suspension 30 milliLiter(s) Oral every 4 hours PRN Dyspepsia  guaiFENesin Oral Liquid (Sugar-Free) 200 milliGRAM(s) Oral every 6 hours PRN Cough  melatonin 3 milliGRAM(s) Oral at bedtime PRN Insomnia  ondansetron Injectable 4 milliGRAM(s) IV Push every 8 hours PRN Nausea and/or Vomiting  simethicone 80 milliGRAM(s) Chew three times a day PRN Indigestion  sodium chloride 0.9% lock flush 10 milliLiter(s) IV Push every 1 hour PRN Pre/post blood products, medications, blood draw, and to maintain line patency      Diet, Consistent Carbohydrate w/Evening Snack:   Low Sodium  Supplement Feeding Modality:  Oral  Glucerna Shake Cans or Servings Per Day:  1       Frequency:  Three Times a day (02-18-22 @ 15:52) [Active]          Vital Signs Last 24 Hrs  T(C): 36.8 (24 Feb 2022 20:02), Max: 36.8 (24 Feb 2022 20:02)  T(F): 98.3 (24 Feb 2022 20:02), Max: 98.3 (24 Feb 2022 20:02)  HR: 96 (24 Feb 2022 20:02) (95 - 96)  BP: 95/60 (25 Feb 2022 05:44) (95/60 - 109/75)  BP(mean): --  RR: 18 (24 Feb 2022 20:02) (17 - 18)  SpO2: 96% (24 Feb 2022 20:02) (92% - 96%)      02-24-22 @ 07:01  -  02-25-22 @ 07:00  --------------------------------------------------------  IN: 0 mL / OUT: 800 mL / NET: -800 mL              LABS:                        13.0   6.97  )-----------( 174      ( 24 Feb 2022 07:52 )             42.0     02-24    139  |  105  |  39<H>  ----------------------------<  132<H>  4.4   |  29  |  1.20    Ca    8.8      24 Feb 2022 07:52    TPro  6.0  /  Alb  2.3<L>  /  TBili  1.2  /  DBili  x   /  AST  72<H>  /  ALT  22  /  AlkPhos  552<H>  02-24              WBC:  WBC Count: 6.97 K/uL (02-24 @ 07:52)  WBC Count: 5.52 K/uL (02-22 @ 07:55)  WBC Count: 6.03 K/uL (02-22 @ 00:43)  WBC Count: 5.24 K/uL (02-21 @ 08:04)      MICROBIOLOGY:  RECENT CULTURES:  02-23 .Blood Blood-Peripheral XXXX XXXX   No growth to date.    02-22 .Blood Blood XXXX XXXX   No growth to date.                    Sodium:  Sodium, Serum: 139 mmol/L (02-24 @ 07:52)  Sodium, Serum: 141 mmol/L (02-22 @ 07:55)  Sodium, Serum: 141 mmol/L (02-22 @ 00:43)  Sodium, Serum: 140 mmol/L (02-21 @ 09:32)      1.20 mg/dL 02-24 @ 07:52  1.20 mg/dL 02-22 @ 07:55  1.20 mg/dL 02-22 @ 00:43  1.40 mg/dL 02-21 @ 09:32      Hemoglobin:  Hemoglobin: 13.0 g/dL (02-24 @ 07:52)  Hemoglobin: 13.6 g/dL (02-22 @ 07:55)  Hemoglobin: 14.0 g/dL (02-22 @ 00:43)  Hemoglobin: 15.2 g/dL (02-21 @ 08:04)      Platelets: Platelet Count - Automated: 174 K/uL (02-24 @ 07:52)  Platelet Count - Automated: 154 K/uL (02-22 @ 07:55)  Platelet Count - Automated: 146 K/uL (02-22 @ 00:43)  Platelet Count - Automated: 132 K/uL (02-21 @ 08:04)      LIVER FUNCTIONS - ( 24 Feb 2022 07:52 )  Alb: 2.3 g/dL / Pro: 6.0 g/dL / ALK PHOS: 552 U/L / ALT: 22 U/L / AST: 72 U/L / GGT: x                 RADIOLOGY & ADDITIONAL STUDIES:      MICROBIOLOGY:  RECENT CULTURES:  02-23 .Blood Blood-Peripheral XXXX XXXX   No growth to date.    02-22 .Blood Blood XXXX XXXX   No growth to date.            
    DOROTHY VOSS    [facility] TELE 304 W1    Allergies    No Known Allergies    Intolerances    pork (Other)      PAST MEDICAL & SURGICAL HISTORY:  Heart failure, systolic    CAD (coronary artery disease)    Hypertension    Nonischemic cardiomyopathy    COPD, moderate    2019 novel coronavirus disease (COVID-19)    Substance abuse    HLD (hyperlipidemia)    Cardiac LV ejection fraction 10-20%    AICD (automatic cardioverter/defibrillator) present    Cardiac LV ejection fraction 10-20%        FAMILY HISTORY:  FH: lung cancer        Home Medications:  acetaminophen 325 mg oral tablet: 2 tab(s) orally every 6 hours, As needed, Temp greater or equal to 38C (100.4F), Mild Pain (1 - 3) (13 Feb 2022 10:49)  Aquaphor Healing topical ointment: Apply topically to affected area once a day (13 Feb 2022 10:49)  ascorbic acid 500 mg oral tablet: 1 tab(s) orally once a day (19 Feb 2022 13:16)  Bacid (LAC) oral capsule: 2 cap(s) orally once a day (13 Feb 2022 10:49)  bumetanide 2 mg oral tablet: 1 tab(s) orally 2 times a day (19 Feb 2022 13:16)  ceFAZolin: 2 gram(s) intravenous every 8 hours ,last day 03/26/22 (19 Feb 2022 13:16)  gabapentin 100 mg oral capsule: 1 cap(s) orally 3 times a day (13 Feb 2022 10:49)  melatonin 3 mg oral tablet: 1 tab(s) orally once a day (at bedtime), As needed, Insomnia (13 Feb 2022 10:49)  metoprolol succinate 25 mg oral tablet, extended release: 1 tab(s) orally once a day (19 Feb 2022 13:16)  Multiple Vitamins with Minerals oral tablet: 1 tab(s) orally once a day (19 Feb 2022 13:16)  pantoprazole 40 mg oral delayed release tablet: 1 tab(s) orally once a day (before a meal) (19 Feb 2022 13:16)  simethicone 80 mg oral tablet: 2 tab(s) orally every 6 hours, As Needed (13 Feb 2022 10:49)  spironolactone 25 mg oral tablet: 1 tab(s) orally once a day (19 Feb 2022 13:16)  Symbicort 160 mcg-4.5 mcg/inh inhalation aerosol: 2 puff(s) inhaled 2 times a day (13 Feb 2022 10:49)  Ventolin HFA 90 mcg/inh inhalation aerosol: 2 puff(s) inhaled every 6 hours, As Needed (13 Feb 2022 10:49)      MEDICATIONS  (STANDING):  aMIOdarone    Tablet 200 milliGRAM(s) Oral daily  apixaban 5 milliGRAM(s) Oral every 12 hours  aspirin enteric coated 81 milliGRAM(s) Oral daily  atorvastatin 40 milliGRAM(s) Oral at bedtime  buMETAnide 2 milliGRAM(s) Oral two times a day  ceFAZolin   IVPB 2000 milliGRAM(s) IV Intermittent every 8 hours  chlorhexidine 4% Liquid 1 Application(s) Topical <User Schedule>  gabapentin 100 milliGRAM(s) Oral three times a day  lactobacillus acidophilus 1 Tablet(s) Oral two times a day  lidocaine   4% Patch 1 Patch Transdermal daily  metoprolol succinate ER 25 milliGRAM(s) Oral daily  midodrine. 5 milliGRAM(s) Oral once  pantoprazole    Tablet 40 milliGRAM(s) Oral before breakfast  spironolactone 25 milliGRAM(s) Oral daily    MEDICATIONS  (PRN):  acetaminophen     Tablet .. 650 milliGRAM(s) Oral every 6 hours PRN Temp greater or equal to 38C (100.4F), Mild Pain (1 - 3)  ALBUTerol    90 MICROgram(s) HFA Inhaler 2 Puff(s) Inhalation every 6 hours PRN Shortness of Breath and/or Wheezing  aluminum hydroxide/magnesium hydroxide/simethicone Suspension 30 milliLiter(s) Oral every 4 hours PRN Dyspepsia  guaiFENesin Oral Liquid (Sugar-Free) 200 milliGRAM(s) Oral every 6 hours PRN Cough  melatonin 3 milliGRAM(s) Oral at bedtime PRN Insomnia  ondansetron Injectable 4 milliGRAM(s) IV Push every 8 hours PRN Nausea and/or Vomiting  simethicone 80 milliGRAM(s) Chew three times a day PRN Indigestion  sodium chloride 0.9% lock flush 10 milliLiter(s) IV Push every 1 hour PRN Pre/post blood products, medications, blood draw, and to maintain line patency      Diet, Consistent Carbohydrate w/Evening Snack:   Low Sodium  Supplement Feeding Modality:  Oral  Glucerna Shake Cans or Servings Per Day:  1       Frequency:  Three Times a day (02-18-22 @ 15:52) [Active]          Vital Signs Last 24 Hrs  T(C): 37.2 (23 Feb 2022 05:30), Max: 38.9 (22 Feb 2022 19:13)  T(F): 98.9 (23 Feb 2022 05:30), Max: 102.1 (22 Feb 2022 19:13)  HR: 101 (23 Feb 2022 05:30) (80 - 101)  BP: 102/66 (23 Feb 2022 05:30) (85/56 - 102/66)  BP(mean): --  RR: 20 (23 Feb 2022 05:30) (18 - 20)  SpO2: 95% (23 Feb 2022 05:30) (93% - 98%)      02-22-22 @ 07:01  -  02-23-22 @ 07:00  --------------------------------------------------------  IN: 0 mL / OUT: 1600 mL / NET: -1600 mL              LABS:                        13.6   5.52  )-----------( 154      ( 22 Feb 2022 07:55 )             43.7     02-22    141  |  104  |  59<H>  ----------------------------<  111<H>  4.4   |  33<H>  |  1.20    Ca    8.2<L>      22 Feb 2022 07:55    TPro  5.3<L>  /  Alb  2.1<L>  /  TBili  0.8  /  DBili  x   /  AST  53<H>  /  ALT  20  /  AlkPhos  414<H>  02-22              WBC:  WBC Count: 5.52 K/uL (02-22 @ 07:55)  WBC Count: 6.03 K/uL (02-22 @ 00:43)  WBC Count: 5.24 K/uL (02-21 @ 08:04)  WBC Count: 6.17 K/uL (02-19 @ 08:07)      MICROBIOLOGY:  RECENT CULTURES:  02-22 .Blood Blood XXXX XXXX   No growth to date.                    Sodium:  Sodium, Serum: 141 mmol/L (02-22 @ 07:55)  Sodium, Serum: 141 mmol/L (02-22 @ 00:43)  Sodium, Serum: 140 mmol/L (02-21 @ 09:32)  Sodium, Serum: 135 mmol/L (02-19 @ 09:28)      1.20 mg/dL 02-22 @ 07:55  1.20 mg/dL 02-22 @ 00:43  1.40 mg/dL 02-21 @ 09:32  1.80 mg/dL 02-19 @ 09:28      Hemoglobin:  Hemoglobin: 13.6 g/dL (02-22 @ 07:55)  Hemoglobin: 14.0 g/dL (02-22 @ 00:43)  Hemoglobin: 15.2 g/dL (02-21 @ 08:04)  Hemoglobin: 16.8 g/dL (02-19 @ 08:07)      Platelets: Platelet Count - Automated: 154 K/uL (02-22 @ 07:55)  Platelet Count - Automated: 146 K/uL (02-22 @ 00:43)  Platelet Count - Automated: 132 K/uL (02-21 @ 08:04)  Platelet Count - Automated: 189 K/uL (02-19 @ 08:07)      LIVER FUNCTIONS - ( 22 Feb 2022 00:43 )  Alb: 2.1 g/dL / Pro: 5.3 g/dL / ALK PHOS: 414 U/L / ALT: 20 U/L / AST: 53 U/L / GGT: x                 RADIOLOGY & ADDITIONAL STUDIES:      MICROBIOLOGY:  RECENT CULTURES:  02-22 .Blood Blood XXXX XXXX   No growth to date.            
    DOROTHY VOSS    [facility] TELE 304 W1    Allergies    No Known Allergies    Intolerances    pork (Other)      PAST MEDICAL & SURGICAL HISTORY:  Heart failure, systolic    CAD (coronary artery disease)    Hypertension    Nonischemic cardiomyopathy    COPD, moderate    2019 novel coronavirus disease (COVID-19)    Substance abuse    HLD (hyperlipidemia)    Cardiac LV ejection fraction 10-20%    AICD (automatic cardioverter/defibrillator) present    Cardiac LV ejection fraction 10-20%        FAMILY HISTORY:  FH: lung cancer        Home Medications:  acetaminophen 325 mg oral tablet: 2 tab(s) orally every 6 hours, As needed, Temp greater or equal to 38C (100.4F), Mild Pain (1 - 3) (2022 10:49)  Aquaphor Healing topical ointment: Apply topically to affected area once a day (2022 10:49)  Bacid (LAC) oral capsule: 2 cap(s) orally once a day (2022 10:49)  DuoNeb 0.5 mg-2.5 mg/3 mL inhalation solution: 3 milliliter(s) inhaled 4 times a day, As Needed (2022 10:49)  furosemide 20 mg oral tablet: 1 tab(s) orally once a day (2022 10:49)  gabapentin 100 mg oral capsule: 1 cap(s) orally 3 times a day (2022 10:49)  melatonin 3 mg oral tablet: 1 tab(s) orally once a day (at bedtime), As needed, Insomnia (2022 10:49)  metoprolol succinate 100 mg oral tablet, extended release: 1 tab(s) orally once a day (2022 10:49)  simethicone 80 mg oral tablet: 2 tab(s) orally every 6 hours, As Needed (2022 10:49)  Symbicort 160 mcg-4.5 mcg/inh inhalation aerosol: 2 puff(s) inhaled 2 times a day (2022 10:49)  Ventolin HFA 90 mcg/inh inhalation aerosol: 2 puff(s) inhaled 3 times a day for 5 days. Start: 02/10/22 (2022 10:49)  Ventolin HFA 90 mcg/inh inhalation aerosol: 2 puff(s) inhaled every 6 hours, As Needed (2022 10:49)      MEDICATIONS  (STANDING):  aMIOdarone    Tablet 200 milliGRAM(s) Oral daily  apixaban 5 milliGRAM(s) Oral every 12 hours  aspirin enteric coated 81 milliGRAM(s) Oral daily  atorvastatin 40 milliGRAM(s) Oral at bedtime  buMETAnide Injectable 2 milliGRAM(s) IV Push every 12 hours  ceFAZolin   IVPB 2000 milliGRAM(s) IV Intermittent every 8 hours  dexAMETHasone     Tablet 6 milliGRAM(s) Oral daily  gabapentin 100 milliGRAM(s) Oral three times a day  lactobacillus acidophilus 1 Tablet(s) Oral two times a day  lidocaine   4% Patch 1 Patch Transdermal daily  metoprolol succinate ER 25 milliGRAM(s) Oral daily  pantoprazole    Tablet 40 milliGRAM(s) Oral before breakfast  sacubitril 24 mG/valsartan 26 mG 1 Tablet(s) Oral two times a day  spironolactone 25 milliGRAM(s) Oral daily    MEDICATIONS  (PRN):  acetaminophen     Tablet .. 650 milliGRAM(s) Oral every 6 hours PRN Temp greater or equal to 38C (100.4F), Mild Pain (1 - 3)  ALBUTerol    90 MICROgram(s) HFA Inhaler 2 Puff(s) Inhalation every 6 hours PRN Shortness of Breath and/or Wheezing  aluminum hydroxide/magnesium hydroxide/simethicone Suspension 30 milliLiter(s) Oral every 4 hours PRN Dyspepsia  guaiFENesin Oral Liquid (Sugar-Free) 200 milliGRAM(s) Oral every 6 hours PRN Cough  melatonin 3 milliGRAM(s) Oral at bedtime PRN Insomnia  ondansetron Injectable 4 milliGRAM(s) IV Push every 8 hours PRN Nausea and/or Vomiting  simethicone 80 milliGRAM(s) Chew three times a day PRN Indigestion      Diet, Consistent Carbohydrate w/Evening Snack:   Soft and Bite Sized (SOFTBTSZ)  Low Sodium  Supplement Feeding Modality:  Oral  Glucerna Shake Cans or Servings Per Day:  1       Frequency:  Two Times a day (22 @ 11:55) [Active]          Vital Signs Last 24 Hrs  T(C): 36.3 (2022 05:08), Max: 36.8 (2022 11:40)  T(F): 97.4 (2022 05:08), Max: 98.3 (2022 11:40)  HR: 67 (2022 05:08) (61 - 67)  BP: 108/71 (2022 05:08) (105/73 - 117/81)  BP(mean): --  RR: 17 (2022 05:08) (17 - 18)  SpO2: 97% (2022 05:08) (94% - 98%)      22 @ 07:01  -  22 @ 07:00  --------------------------------------------------------  IN: 0 mL / OUT: 2850 mL / NET: -2850 mL              LABS:                        14.9   9.70  )-----------( 206      ( 2022 07:27 )             47.2         138  |  101  |  54<H>  ----------------------------<  135<H>  4.9   |  31  |  1.10    Ca    7.8<L>      2022 07:27        Urinalysis Basic - ( 15 Feb 2022 09:54 )    Color: Yellow / Appearance: Clear / S.015 / pH: x  Gluc: x / Ketone: Negative  / Bili: Negative / Urobili: Negative   Blood: x / Protein: 15 / Nitrite: Negative   Leuk Esterase: Negative / RBC: x / WBC 0-2   Sq Epi: x / Non Sq Epi: Occasional / Bacteria: x            WBC:  WBC Count: 9.70 K/uL ( @ 07:27)  WBC Count: 9.90 K/uL ( @ 07:27)  WBC Count: 9.76 K/uL (02-15 @ 07:53)  WBC Count: 9.75 K/uL ( @ 07:48)  WBC Count: 11.78 K/uL ( @ 08:21)      MICROBIOLOGY:  RECENT CULTURES:  02-15 Clean Catch Clean Catch (Midstream) XXXX XXXX   No growth     .Blood Blood-Peripheral XXXX XXXX   No growth to date.     .Blood Blood-Peripheral XXXX   Growth in aerobic bottle: Gram Positive Cocci in Clusters  Growth in anaerobic bottle: Gram Positive Cocci in Clusters   Growth in aerobic and anaerobic bottles: Staphylococcus aureus  See previous culture [id number]     .Blood Blood-Peripheral Blood Culture PCR  Staphylococcus aureus   Growth in aerobic bottle: Gram Positive Cocci in Clusters  Growth in anaerobic bottle: Gram Positive Cocci in Clusters   Growth in aerobic and anaerobic bottles: Staphylococcus aureus  See previous culture [id number]                    Sodium:  Sodium, Serum: 138 mmol/L ( @ 07:27)  Sodium, Serum: 139 mmol/L (02-15 @ 07:53)  Sodium, Serum: 135 mmol/L ( @ 07:48)  Sodium, Serum: 134 mmol/L ( @ 08:21)      1.10 mg/dL  07:27  1.20 mg/dL 02-15 @ 07:53  1.40 mg/dL  07:48  1.50 mg/dL  @ 08:21      Hemoglobin:  Hemoglobin: 14.9 g/dL ( @ 07:27)  Hemoglobin: 13.8 g/dL ( @ 07:27)  Hemoglobin: 13.3 g/dL (02-15 @ 07:53)  Hemoglobin: 13.7 g/dL ( @ 07:48)  Hemoglobin: 12.7 g/dL ( @ 08:21)      Platelets: Platelet Count - Automated: 206 K/uL ( @ 07:27)  Platelet Count - Automated: 193 K/uL ( @ 07:27)  Platelet Count - Automated: 162 K/uL (02-15 @ 07:53)  Platelet Count - Automated: 180 K/uL ( @ 07:48)  Platelet Count - Automated: 175 K/uL ( @ 08:21)          Urinalysis Basic - ( 15 Feb 2022 09:54 )    Color: Yellow / Appearance: Clear / S.015 / pH: x  Gluc: x / Ketone: Negative  / Bili: Negative / Urobili: Negative   Blood: x / Protein: 15 / Nitrite: Negative   Leuk Esterase: Negative / RBC: x / WBC 0-2   Sq Epi: x / Non Sq Epi: Occasional / Bacteria: x        RADIOLOGY & ADDITIONAL STUDIES:      MICROBIOLOGY:  RECENT CULTURES:  02-15 Clean Catch Clean Catch (Midstream) XXXX XXXX   No growth     .Blood Blood-Peripheral XXXX XXXX   No growth to date.     .Blood Blood-Peripheral XXXX   Growth in aerobic bottle: Gram Positive Cocci in Clusters  Growth in anaerobic bottle: Gram Positive Cocci in Clusters   Growth in aerobic and anaerobic bottles: Staphylococcus aureus  See previous culture [id number]     .Blood Blood-Peripheral Blood Culture PCR  Staphylococcus aureus   Growth in aerobic bottle: Gram Positive Cocci in Clusters  Growth in anaerobic bottle: Gram Positive Cocci in Clusters   Growth in aerobic and anaerobic bottles: Staphylococcus aureus  See previous culture [id number]            
    DOROTHY VOSS    [facility] TELE 304 W1    Allergies    No Known Allergies    Intolerances    pork (Other)      PAST MEDICAL & SURGICAL HISTORY:  Heart failure, systolic    CAD (coronary artery disease)    Hypertension    Nonischemic cardiomyopathy    COPD, moderate    2019 novel coronavirus disease (COVID-19)    Substance abuse    HLD (hyperlipidemia)    Cardiac LV ejection fraction 10-20%    AICD (automatic cardioverter/defibrillator) present    Cardiac LV ejection fraction 10-20%        FAMILY HISTORY:  FH: lung cancer        Home Medications:  acetaminophen 325 mg oral tablet: 2 tab(s) orally every 6 hours, As needed, Temp greater or equal to 38C (100.4F), Mild Pain (1 - 3) (2022 10:49)  Aquaphor Healing topical ointment: Apply topically to affected area once a day (2022 10:49)  Bacid (LAC) oral capsule: 2 cap(s) orally once a day (2022 10:49)  DuoNeb 0.5 mg-2.5 mg/3 mL inhalation solution: 3 milliliter(s) inhaled 4 times a day, As Needed (2022 10:49)  furosemide 20 mg oral tablet: 1 tab(s) orally once a day (2022 10:49)  gabapentin 100 mg oral capsule: 1 cap(s) orally 3 times a day (2022 10:49)  melatonin 3 mg oral tablet: 1 tab(s) orally once a day (at bedtime), As needed, Insomnia (2022 10:49)  metoprolol succinate 100 mg oral tablet, extended release: 1 tab(s) orally once a day (2022 10:49)  simethicone 80 mg oral tablet: 2 tab(s) orally every 6 hours, As Needed (2022 10:49)  Symbicort 160 mcg-4.5 mcg/inh inhalation aerosol: 2 puff(s) inhaled 2 times a day (2022 10:49)  Ventolin HFA 90 mcg/inh inhalation aerosol: 2 puff(s) inhaled 3 times a day for 5 days. Start: 02/10/22 (2022 10:49)  Ventolin HFA 90 mcg/inh inhalation aerosol: 2 puff(s) inhaled every 6 hours, As Needed (2022 10:49)      MEDICATIONS  (STANDING):  aMIOdarone    Tablet 200 milliGRAM(s) Oral daily  apixaban 5 milliGRAM(s) Oral every 12 hours  aspirin enteric coated 81 milliGRAM(s) Oral daily  atorvastatin 40 milliGRAM(s) Oral at bedtime  buMETAnide Injectable 2 milliGRAM(s) IV Push every 12 hours  ceFAZolin   IVPB 2000 milliGRAM(s) IV Intermittent every 8 hours  dexAMETHasone     Tablet 6 milliGRAM(s) Oral daily  gabapentin 100 milliGRAM(s) Oral three times a day  lactobacillus acidophilus 1 Tablet(s) Oral two times a day  lidocaine   4% Patch 1 Patch Transdermal daily  metoprolol succinate ER 25 milliGRAM(s) Oral daily  pantoprazole    Tablet 40 milliGRAM(s) Oral before breakfast  sacubitril 24 mG/valsartan 26 mG 1 Tablet(s) Oral two times a day  spironolactone 25 milliGRAM(s) Oral daily    MEDICATIONS  (PRN):  acetaminophen     Tablet .. 650 milliGRAM(s) Oral every 6 hours PRN Temp greater or equal to 38C (100.4F), Mild Pain (1 - 3)  aluminum hydroxide/magnesium hydroxide/simethicone Suspension 30 milliLiter(s) Oral every 4 hours PRN Dyspepsia  melatonin 3 milliGRAM(s) Oral at bedtime PRN Insomnia  ondansetron Injectable 4 milliGRAM(s) IV Push every 8 hours PRN Nausea and/or Vomiting  simethicone 80 milliGRAM(s) Chew three times a day PRN Indigestion      Diet, Consistent Carbohydrate w/Evening Snack:   Soft and Bite Sized (SOFTBTSZ)  Low Sodium  Supplement Feeding Modality:  Oral  Glucerna Shake Cans or Servings Per Day:  1       Frequency:  Two Times a day (22 @ 11:55) [Active]          Vital Signs Last 24 Hrs  T(C): 36.3 (2022 07:12), Max: 36.8 (15 Feb 2022 19:54)  T(F): 97.4 (2022 07:12), Max: 98.2 (15 Feb 2022 19:54)  HR: 64 (2022 07:12) (61 - 78)  BP: 107/75 (2022 07:12) (103/68 - 120/86)  BP(mean): --  RR: 17 (2022 05:05) (17 - 18)  SpO2: 94% (2022 07:12) (92% - 98%)      02-15-22 @ 07:01  -  22 @ 07:00  --------------------------------------------------------  IN: 0 mL / OUT: 1400 mL / NET: -1400 mL              LABS:                        13.8   9.90  )-----------( 193      ( 2022 07:27 )             43.7     02-15    139  |  100  |  58<H>  ----------------------------<  161<H>  4.5   |  34<H>  |  1.20    Ca    8.6      15 Feb 2022 07:53        Urinalysis Basic - ( 15 Feb 2022 09:54 )    Color: Yellow / Appearance: Clear / S.015 / pH: x  Gluc: x / Ketone: Negative  / Bili: Negative / Urobili: Negative   Blood: x / Protein: 15 / Nitrite: Negative   Leuk Esterase: Negative / RBC: x / WBC 0-2   Sq Epi: x / Non Sq Epi: Occasional / Bacteria: x            WBC:  WBC Count: 9.90 K/uL ( @ 07:27)  WBC Count: 9.76 K/uL (02-15 @ 07:53)  WBC Count: 9.75 K/uL ( @ 07:48)  WBC Count: 11.78 K/uL ( @ 08:21)      MICROBIOLOGY:  RECENT CULTURES:   .Blood Blood-Peripheral XXXX XXXX   No growth to date.     .Blood Blood-Peripheral XXXX   Growth in aerobic bottle: Gram Positive Cocci in Clusters  Growth in anaerobic bottle: Gram Positive Cocci in Clusters   Growth in aerobic and anaerobic bottles: Staphylococcus aureus  See previous culture [id number]     .Blood Blood-Peripheral Blood Culture PCR  Staphylococcus aureus   Growth in aerobic bottle: Gram Positive Cocci in Clusters  Growth in anaerobic bottle: Gram Positive Cocci in Clusters   Growth in aerobic and anaerobic bottles: Staphylococcus aureus  See previous culture [id number]                    Sodium:  Sodium, Serum: 139 mmol/L (02-15 @ 07:53)  Sodium, Serum: 135 mmol/L ( @ 07:48)  Sodium, Serum: 134 mmol/L ( @ 08:21)      1.20 mg/dL 02-15 @ 07:53  1.40 mg/dL  @ 07:48  1.50 mg/dL  @ 08:21      Hemoglobin:  Hemoglobin: 13.8 g/dL ( @ 07:27)  Hemoglobin: 13.3 g/dL (02-15 @ 07:53)  Hemoglobin: 13.7 g/dL ( @ 07:48)  Hemoglobin: 12.7 g/dL ( @ 08:21)      Platelets: Platelet Count - Automated: 193 K/uL ( @ 07:27)  Platelet Count - Automated: 162 K/uL (02-15 @ 07:53)  Platelet Count - Automated: 180 K/uL ( @ 07:48)  Platelet Count - Automated: 175 K/uL ( @ 08:21)          Urinalysis Basic - ( 15 Feb 2022 09:54 )    Color: Yellow / Appearance: Clear / S.015 / pH: x  Gluc: x / Ketone: Negative  / Bili: Negative / Urobili: Negative   Blood: x / Protein: 15 / Nitrite: Negative   Leuk Esterase: Negative / RBC: x / WBC 0-2   Sq Epi: x / Non Sq Epi: Occasional / Bacteria: x        RADIOLOGY & ADDITIONAL STUDIES:      MICROBIOLOGY:  RECENT CULTURES:   .Blood Blood-Peripheral XXXX XXXX   No growth to date.     .Blood Blood-Peripheral XXXX   Growth in aerobic bottle: Gram Positive Cocci in Clusters  Growth in anaerobic bottle: Gram Positive Cocci in Clusters   Growth in aerobic and anaerobic bottles: Staphylococcus aureus  See previous culture [id number]     .Blood Blood-Peripheral Blood Culture PCR  Staphylococcus aureus   Growth in aerobic bottle: Gram Positive Cocci in Clusters  Growth in anaerobic bottle: Gram Positive Cocci in Clusters   Growth in aerobic and anaerobic bottles: Staphylococcus aureus  See previous culture [id number]            
  Patient is a 61y Male whom presented to the hospital with ckd and ki     PAST MEDICAL & SURGICAL HISTORY:  Heart failure, systolic    CAD (coronary artery disease)    Hypertension    Nonischemic cardiomyopathy    COPD, moderate    2019 novel coronavirus disease (COVID-19)    Substance abuse    HLD (hyperlipidemia)    Cardiac LV ejection fraction 10-20%    AICD (automatic cardioverter/defibrillator) present    Cardiac LV ejection fraction 10-20%        MEDICATIONS  (STANDING):  apixaban 5 milliGRAM(s) Oral every 12 hours  atorvastatin 40 milliGRAM(s) Oral at bedtime  buDESOnide    Inhalation Suspension 0.5 milliGRAM(s) Inhalation two times a day  buMETAnide Injectable 2 milliGRAM(s) IV Push every 12 hours  dexAMETHasone     Tablet 6 milliGRAM(s) Oral daily  gabapentin 100 milliGRAM(s) Oral three times a day  lactobacillus acidophilus 1 Tablet(s) Oral two times a day  pantoprazole    Tablet 40 milliGRAM(s) Oral before breakfast                                                                                        13.6   5.52  )-----------( 154      ( 22 Feb 2022 07:55 )             43.7       CBC Full  -  ( 22 Feb 2022 07:55 )  WBC Count : 5.52 K/uL  RBC Count : 5.09 M/uL  Hemoglobin : 13.6 g/dL  Hematocrit : 43.7 %  Platelet Count - Automated : 154 K/uL  Mean Cell Volume : 85.9 fl  Mean Cell Hemoglobin : 26.7 pg  Mean Cell Hemoglobin Concentration : 31.1 gm/dL  Auto Neutrophil # : x  Auto Lymphocyte # : x  Auto Monocyte # : x  Auto Eosinophil # : x  Auto Basophil # : x  Auto Neutrophil % : x  Auto Lymphocyte % : x  Auto Monocyte % : x  Auto Eosinophil % : x  Auto Basophil % : x      02-22    141  |  104  |  59<H>  ----------------------------<  111<H>  4.4   |  33<H>  |  1.20    Ca    8.2<L>      22 Feb 2022 07:55    TPro  5.3<L>  /  Alb  2.1<L>  /  TBili  0.8  /  DBili  x   /  AST  53<H>  /  ALT  20  /  AlkPhos  414<H>  02-22      CAPILLARY BLOOD GLUCOSE          Vital Signs Last 24 Hrs  T(C): 37.4 (23 Feb 2022 19:56), Max: 37.4 (23 Feb 2022 19:56)  T(F): 99.3 (23 Feb 2022 19:56), Max: 99.3 (23 Feb 2022 19:56)  HR: 97 (23 Feb 2022 19:56) (94 - 101)  BP: 118/71 (23 Feb 2022 19:56) (93/65 - 118/71)  BP(mean): --  RR: 17 (23 Feb 2022 19:56) (17 - 20)  SpO2: 90% (23 Feb 2022 19:56) (90% - 95%)                Allergies    No Known Allergies    Intolerances    pork (Other)      SOCIAL HISTORY:  Denies ETOh,Smoking,     FAMILY HISTORY:  FH: lung cancer        REVIEW OF SYSTEMS:  unable to obtained a good review system                                PHYSICAL EXAM:    Constitutional: NAD  HEENT: conjunctive   clear   Neck:  No JVD  Respiratory: decrease bs b/l   Cardiovascular: S1 and S2  Gastrointestinal: BS+, soft,   Extremities: No peripheral edema    
  Patient is a 61y Male whom presented to the hospital with ckd and ki     PAST MEDICAL & SURGICAL HISTORY:  Heart failure, systolic    CAD (coronary artery disease)    Hypertension    Nonischemic cardiomyopathy    COPD, moderate    2019 novel coronavirus disease (COVID-19)    Substance abuse    HLD (hyperlipidemia)    Cardiac LV ejection fraction 10-20%    AICD (automatic cardioverter/defibrillator) present    Cardiac LV ejection fraction 10-20%        MEDICATIONS  (STANDING):  apixaban 5 milliGRAM(s) Oral every 12 hours  atorvastatin 40 milliGRAM(s) Oral at bedtime  buDESOnide    Inhalation Suspension 0.5 milliGRAM(s) Inhalation two times a day  buMETAnide Injectable 2 milliGRAM(s) IV Push every 12 hours  dexAMETHasone     Tablet 6 milliGRAM(s) Oral daily  gabapentin 100 milliGRAM(s) Oral three times a day  lactobacillus acidophilus 1 Tablet(s) Oral two times a day  pantoprazole    Tablet 40 milliGRAM(s) Oral before breakfast      Allergies    No Known Allergies    Intolerances    pork (Other)      SOCIAL HISTORY:  Denies ETOh,Smoking,     FAMILY HISTORY:  FH: lung cancer        REVIEW OF SYSTEMS:  unable to obtained a good review system        VITAL:  T(C): , Max: 37.5 (02-11-22 @ 09:20)  T(F): , Max: 99.5 (02-11-22 @ 09:20)  HR: 82 (02-11-22 @ 19:00)  BP: 102/68 (02-11-22 @ 12:53)  BP(mean): --  RR: 20 (02-11-22 @ 19:00)  SpO2: 100% (02-11-22 @ 19:00)  Wt(kg): --    I and O's:    Height (cm): 182.9 (02-11 @ 09:20)  Weight (kg): 72.6 (02-11 @ 09:24)  BMI (kg/m2): 21.7 (02-11 @ 09:24)  BSA (m2): 1.94 (02-11 @ 09:24)    PHYSICAL EXAM:    Constitutional: NAD  HEENT: conjunctive   clear   Neck:  No JVD  Respiratory: decrease bs b/l   Cardiovascular: S1 and S2  Gastrointestinal: BS+, soft,   Extremities: No peripheral edema    
Chief Complaint: Shortness of breath    Interval Events: No events overnight.    Review of Systems:  General: No fevers, chills, weight gain  Skin: No rashes, color changes  Cardiovascular: No chest pain, orthopnea  Respiratory: No shortness of breath, cough  Gastrointestinal: No nausea, abdominal pain  Genitourinary: No incontinence, pain with urination  Musculoskeletal: No pain, swelling, decreased range of motion  Neurological: No headache, weakness  Psychiatric: No depression, anxiety  Endocrine: No weight gain, increased thirst  All other systems are comprehensively negative.    Physical Exam:  Vital Signs Last 24 Hrs  T(C): 36.4 (21 Feb 2022 04:30), Max: 37.7 (20 Feb 2022 20:33)  T(F): 97.6 (21 Feb 2022 04:30), Max: 99.8 (20 Feb 2022 20:33)  HR: 92 (21 Feb 2022 04:30) (92 - 98)  BP: 128/87 (21 Feb 2022 04:30) (80/57 - 128/87)  BP(mean): --  RR: 18 (21 Feb 2022 04:30) (18 - 18)  SpO2: 92% (21 Feb 2022 04:30) (92% - 96%)  General: NAD  HEENT: MMM  Neck: No JVD, no carotid bruit  Lungs: CTAB  CV: RRR, nl S1/S2, no M/R/G  Abdomen: S/NT/ND, +BS  Extremities: Trace LE edema, no cyanosis  Neuro: AAOx3, non-focal  Skin: No rash    Labs:    02-21    140  |  103  |  64<H>  ----------------------------<  123<H>  4.5   |  33<H>  |  1.40<H>    Ca    8.5      21 Feb 2022 09:32    TPro  5.9<L>  /  Alb  2.4<L>  /  TBili  1.2  /  DBili  x   /  AST  46<H>  /  ALT  17  /  AlkPhos  339<H>  02-21                        15.2   5.24  )-----------( 132      ( 21 Feb 2022 08:04 )             48.4       Telemetry: Sinus rhythm
Chief Complaint: Shortness of breath    Interval Events: No events overnight.    Review of Systems:  General: No fevers, chills, weight gain  Skin: No rashes, color changes  Cardiovascular: No chest pain, orthopnea  Respiratory: No shortness of breath, cough  Gastrointestinal: No nausea, abdominal pain  Genitourinary: No incontinence, pain with urination  Musculoskeletal: No pain, swelling, decreased range of motion  Neurological: No headache, weakness  Psychiatric: No depression, anxiety  Endocrine: No weight gain, increased thirst  All other systems are comprehensively negative.    Physical Exam:  Vital Signs Last 24 Hrs  T(C): 36.9 (20 Feb 2022 04:35), Max: 37.1 (19 Feb 2022 21:06)  T(F): 98.5 (20 Feb 2022 04:35), Max: 98.7 (19 Feb 2022 21:06)  HR: 81 (20 Feb 2022 04:35) (80 - 83)  BP: 94/58 (20 Feb 2022 04:35) (82/60 - 94/58)  BP(mean): --  RR: 17 (20 Feb 2022 04:35) (17 - 18)  SpO2: 95% (20 Feb 2022 04:35) (91% - 95%)  General: NAD  HEENT: MMM  Neck: No JVD, no carotid bruit  Lungs: CTAB  CV: RRR, nl S1/S2, no M/R/G  Abdomen: S/NT/ND, +BS  Extremities: Trace LE edema, no cyanosis  Neuro: AAOx3, non-focal  Skin: No rash    Labs:    02-19    135  |  96  |  95<H>  ----------------------------<  115<H>  5.7<H>   |  36<H>  |  1.80<H>    Ca    7.9<L>      19 Feb 2022 09:28                          16.8   6.17  )-----------( 189      ( 19 Feb 2022 08:07 )             52.2       Telemetry: Sinus rhythm
Chief Complaint: Shortness of breath    Interval Events: No events overnight.    Review of Systems:  General: No fevers, chills, weight gain  Skin: No rashes, color changes  Cardiovascular: No chest pain, orthopnea  Respiratory: No shortness of breath, cough  Gastrointestinal: No nausea, abdominal pain  Genitourinary: No incontinence, pain with urination  Musculoskeletal: No pain, swelling, decreased range of motion  Neurological: No headache, weakness  Psychiatric: No depression, anxiety  Endocrine: No weight gain, increased thirst  All other systems are comprehensively negative.    Physical Exam:  Vitals:        Vital Signs Last 24 Hrs  T(C): 37.1 (12 Feb 2022 06:28), Max: 37.6 (11 Feb 2022 21:23)  T(F): 98.7 (12 Feb 2022 06:28), Max: 99.6 (11 Feb 2022 21:23)  HR: 83 (12 Feb 2022 08:18) (73 - 97)  BP: 146/81 (12 Feb 2022 06:28) (102/68 - 146/81)  BP(mean): --  RR: 19 (12 Feb 2022 06:28) (17 - 20)  SpO2: 96% (12 Feb 2022 08:18) (93% - 100%)  General: NAD  HEENT: MMM  Neck: No JVD, no carotid bruit  Lungs: CTAB  CV: RRR, nl S1/S2, no M/R/G  Abdomen: S/NT/ND, +BS  Extremities: No LE edema, no cyanosis  Neuro: AAOx3, non-focal  Skin: No rash    Labs:                        13.6   15.77 )-----------( 173      ( 12 Feb 2022 05:48 )             41.2     02-12    133<L>  |  99  |  58<H>  ----------------------------<  116<H>  5.0   |  28  |  1.60<H>    Ca    8.4<L>      12 Feb 2022 05:48  Phos  4.1     02-12  Mg     2.6     02-12    TPro  5.6<L>  /  Alb  2.3<L>  /  TBili  2.9<H>  /  DBili  x   /  AST  34  /  ALT  42  /  AlkPhos  293<H>  02-12        PT/INR - ( 12 Feb 2022 05:48 )   PT: 30.0 sec;   INR: 2.69 ratio         PTT - ( 11 Feb 2022 10:10 )  PTT:38.7 sec    Telemetry: Sinus rhythm, NSVT
Chief Complaint: Shortness of breath    Interval Events: No events overnight. Sleeping.    Review of Systems:  General: No fevers, chills, weight gain  Skin: No rashes, color changes  Cardiovascular: No chest pain, orthopnea  Respiratory: No shortness of breath, cough  Gastrointestinal: No nausea, abdominal pain  Genitourinary: No incontinence, pain with urination  Musculoskeletal: No pain, swelling, decreased range of motion  Neurological: No headache, weakness  Psychiatric: No depression, anxiety  Endocrine: No weight gain, increased thirst  All other systems are comprehensively negative.    Physical Exam:  Vital Signs Last 24 Hrs  T(C): 36.2 (18 Feb 2022 05:03), Max: 36.8 (17 Feb 2022 10:55)  T(F): 97.2 (18 Feb 2022 05:03), Max: 98.3 (17 Feb 2022 10:55)  HR: 71 (18 Feb 2022 05:03) (61 - 71)  BP: 114/79 (18 Feb 2022 05:03) (99/64 - 114/79)  BP(mean): --  RR: 17 (18 Feb 2022 05:03) (17 - 18)  SpO2: 97% (18 Feb 2022 05:03) (95% - 97%)  General: NAD  HEENT: MMM  Neck: No JVD, no carotid bruit  Lungs: CTAB  CV: RRR, nl S1/S2, no M/R/G  Abdomen: S/NT/ND, +BS  Extremities: Trace LE edema, no cyanosis  Neuro: AAOx3, non-focal  Skin: No rash    Labs:    02-17    134<L>  |  97  |  68<H>  ----------------------------<  213<H>  4.7   |  29  |  1.40<H>    Ca    7.9<L>      17 Feb 2022 07:27                          14.9   9.70  )-----------( 206      ( 17 Feb 2022 07:27 )             47.2         Telemetry: Sinus rhythm
Date/Time Patient Seen:  		  Referring MD:   Data Reviewed	       Patient is a 61y old  Male who presents with a chief complaint of SOB (14 Feb 2022 14:38)      Subjective/HPI     PAST MEDICAL & SURGICAL HISTORY:  Heart failure, systolic    CAD (coronary artery disease)    Hypertension    Nonischemic cardiomyopathy    COPD, moderate    2019 novel coronavirus disease (COVID-19)    Substance abuse    HLD (hyperlipidemia)    Cardiac LV ejection fraction 10-20%    No significant past surgical history    AICD (automatic cardioverter/defibrillator) present    Cardiac LV ejection fraction 10-20%          Medication list         MEDICATIONS  (STANDING):  aMIOdarone    Tablet 200 milliGRAM(s) Oral daily  apixaban 5 milliGRAM(s) Oral every 12 hours  aspirin enteric coated 81 milliGRAM(s) Oral daily  atorvastatin 40 milliGRAM(s) Oral at bedtime  buMETAnide Injectable 2 milliGRAM(s) IV Push every 12 hours  ceFAZolin   IVPB 2000 milliGRAM(s) IV Intermittent every 8 hours  dexAMETHasone     Tablet 6 milliGRAM(s) Oral daily  gabapentin 100 milliGRAM(s) Oral three times a day  lactobacillus acidophilus 1 Tablet(s) Oral two times a day  lidocaine   4% Patch 1 Patch Transdermal daily  metoprolol succinate ER 25 milliGRAM(s) Oral daily  pantoprazole    Tablet 40 milliGRAM(s) Oral before breakfast  sacubitril 24 mG/valsartan 26 mG 1 Tablet(s) Oral two times a day  spironolactone 25 milliGRAM(s) Oral daily    MEDICATIONS  (PRN):  acetaminophen     Tablet .. 650 milliGRAM(s) Oral every 6 hours PRN Temp greater or equal to 38C (100.4F), Mild Pain (1 - 3)  aluminum hydroxide/magnesium hydroxide/simethicone Suspension 30 milliLiter(s) Oral every 4 hours PRN Dyspepsia  melatonin 3 milliGRAM(s) Oral at bedtime PRN Insomnia  ondansetron Injectable 4 milliGRAM(s) IV Push every 8 hours PRN Nausea and/or Vomiting  simethicone 80 milliGRAM(s) Chew three times a day PRN Indigestion         Vitals log        ICU Vital Signs Last 24 Hrs  T(C): 36.6 (15 Feb 2022 03:44), Max: 36.8 (14 Feb 2022 17:40)  T(F): 97.8 (15 Feb 2022 03:44), Max: 98.3 (14 Feb 2022 17:40)  HR: 63 (15 Feb 2022 03:44) (61 - 70)  BP: 109/71 (15 Feb 2022 03:44) (109/71 - 120/77)  BP(mean): --  ABP: --  ABP(mean): --  RR: 17 (15 Feb 2022 03:44) (17 - 19)  SpO2: 93% (15 Feb 2022 03:44) (93% - 99%)           Input and Output:  I&O's Detail    13 Feb 2022 07:01  -  14 Feb 2022 07:00  --------------------------------------------------------  IN:    Oral Fluid: 360 mL  Total IN: 360 mL    OUT:    Incontinent per Condom Catheter (mL): 1750 mL  Total OUT: 1750 mL    Total NET: -1390 mL      14 Feb 2022 07:01  -  15 Feb 2022 05:58  --------------------------------------------------------  IN:    Oral Fluid: 240 mL  Total IN: 240 mL    OUT:    Incontinent per Condom Catheter (mL): 1275 mL  Total OUT: 1275 mL    Total NET: -1035 mL          Lab Data                        13.7   9.75  )-----------( 180      ( 14 Feb 2022 07:48 )             44.3     02-14    135  |  98  |  71<H>  ----------------------------<  130<H>  4.8   |  32<H>  |  1.40<H>    Ca    9.0      14 Feb 2022 07:48              Review of Systems	      Objective     Physical Examination  heart s1s2  lung dec BS  abd soft        Pertinent Lab findings & Imaging      Leo:  NO   Adequate UO     I&O's Detail    13 Feb 2022 07:01  -  14 Feb 2022 07:00  --------------------------------------------------------  IN:    Oral Fluid: 360 mL  Total IN: 360 mL    OUT:    Incontinent per Condom Catheter (mL): 1750 mL  Total OUT: 1750 mL    Total NET: -1390 mL      14 Feb 2022 07:01  -  15 Feb 2022 05:58  --------------------------------------------------------  IN:    Oral Fluid: 240 mL  Total IN: 240 mL    OUT:    Incontinent per Condom Catheter (mL): 1275 mL  Total OUT: 1275 mL    Total NET: -1035 mL               Discussed with:     Cultures:	        Radiology                            
Date/Time Patient Seen:  		  Referring MD:   Data Reviewed	       Patient is a 61y old  Male who presents with a chief complaint of SOB (16 Feb 2022 14:19)      Subjective/HPI     PAST MEDICAL & SURGICAL HISTORY:  Heart failure, systolic    CAD (coronary artery disease)    Hypertension    Nonischemic cardiomyopathy    COPD, moderate    2019 novel coronavirus disease (COVID-19)    Substance abuse    HLD (hyperlipidemia)    Cardiac LV ejection fraction 10-20%    No significant past surgical history    AICD (automatic cardioverter/defibrillator) present    Cardiac LV ejection fraction 10-20%          Medication list         MEDICATIONS  (STANDING):  aMIOdarone    Tablet 200 milliGRAM(s) Oral daily  apixaban 5 milliGRAM(s) Oral every 12 hours  aspirin enteric coated 81 milliGRAM(s) Oral daily  atorvastatin 40 milliGRAM(s) Oral at bedtime  buMETAnide Injectable 2 milliGRAM(s) IV Push every 12 hours  ceFAZolin   IVPB 2000 milliGRAM(s) IV Intermittent every 8 hours  dexAMETHasone     Tablet 6 milliGRAM(s) Oral daily  gabapentin 100 milliGRAM(s) Oral three times a day  lactobacillus acidophilus 1 Tablet(s) Oral two times a day  lidocaine   4% Patch 1 Patch Transdermal daily  metoprolol succinate ER 25 milliGRAM(s) Oral daily  pantoprazole    Tablet 40 milliGRAM(s) Oral before breakfast  sacubitril 24 mG/valsartan 26 mG 1 Tablet(s) Oral two times a day  spironolactone 25 milliGRAM(s) Oral daily    MEDICATIONS  (PRN):  acetaminophen     Tablet .. 650 milliGRAM(s) Oral every 6 hours PRN Temp greater or equal to 38C (100.4F), Mild Pain (1 - 3)  ALBUTerol    90 MICROgram(s) HFA Inhaler 2 Puff(s) Inhalation every 6 hours PRN Shortness of Breath and/or Wheezing  aluminum hydroxide/magnesium hydroxide/simethicone Suspension 30 milliLiter(s) Oral every 4 hours PRN Dyspepsia  guaiFENesin Oral Liquid (Sugar-Free) 200 milliGRAM(s) Oral every 6 hours PRN Cough  melatonin 3 milliGRAM(s) Oral at bedtime PRN Insomnia  ondansetron Injectable 4 milliGRAM(s) IV Push every 8 hours PRN Nausea and/or Vomiting  simethicone 80 milliGRAM(s) Chew three times a day PRN Indigestion         Vitals log        ICU Vital Signs Last 24 Hrs  T(C): 36.3 (17 Feb 2022 05:08), Max: 36.8 (16 Feb 2022 11:40)  T(F): 97.4 (17 Feb 2022 05:08), Max: 98.3 (16 Feb 2022 11:40)  HR: 67 (17 Feb 2022 05:08) (61 - 67)  BP: 108/71 (17 Feb 2022 05:08) (105/73 - 117/81)  BP(mean): --  ABP: --  ABP(mean): --  RR: 17 (17 Feb 2022 05:08) (17 - 18)  SpO2: 97% (17 Feb 2022 05:08) (94% - 98%)           Input and Output:  I&O's Detail    15 Feb 2022 07:01  -  16 Feb 2022 07:00  --------------------------------------------------------  IN:  Total IN: 0 mL    OUT:    Incontinent per Condom Catheter (mL): 600 mL    Voided (mL): 800 mL  Total OUT: 1400 mL    Total NET: -1400 mL      16 Feb 2022 07:01  -  17 Feb 2022 06:23  --------------------------------------------------------  IN:  Total IN: 0 mL    OUT:    Incontinent per Condom Catheter (mL): 2850 mL  Total OUT: 2850 mL    Total NET: -2850 mL          Lab Data                        13.8   9.90  )-----------( 193      ( 16 Feb 2022 07:27 )             43.7     02-16    138  |  101  |  54<H>  ----------------------------<  135<H>  4.9   |  31  |  1.10    Ca    7.8<L>      16 Feb 2022 07:27              Review of Systems	      Objective     Physical Examination    heart s1s2  lung dec BS  abd soft      Pertinent Lab findings & Imaging      Leo:  NO   Adequate UO     I&O's Detail    15 Feb 2022 07:01  -  16 Feb 2022 07:00  --------------------------------------------------------  IN:  Total IN: 0 mL    OUT:    Incontinent per Condom Catheter (mL): 600 mL    Voided (mL): 800 mL  Total OUT: 1400 mL    Total NET: -1400 mL      16 Feb 2022 07:01  -  17 Feb 2022 06:23  --------------------------------------------------------  IN:  Total IN: 0 mL    OUT:    Incontinent per Condom Catheter (mL): 2850 mL  Total OUT: 2850 mL    Total NET: -2850 mL               Discussed with:     Cultures:	        Radiology                            
Date/Time Patient Seen:  		  Referring MD:   Data Reviewed	       Patient is a 61y old  Male who presents with a chief complaint of SOB (20 Feb 2022 11:06)      Subjective/HPI     PAST MEDICAL & SURGICAL HISTORY:  Heart failure, systolic    CAD (coronary artery disease)    Hypertension    Nonischemic cardiomyopathy    COPD, moderate    2019 novel coronavirus disease (COVID-19)    Substance abuse    HLD (hyperlipidemia)    Cardiac LV ejection fraction 10-20%    No significant past surgical history    AICD (automatic cardioverter/defibrillator) present    Cardiac LV ejection fraction 10-20%          Medication list         MEDICATIONS  (STANDING):  aMIOdarone    Tablet 200 milliGRAM(s) Oral daily  apixaban 5 milliGRAM(s) Oral every 12 hours  aspirin enteric coated 81 milliGRAM(s) Oral daily  atorvastatin 40 milliGRAM(s) Oral at bedtime  buMETAnide 2 milliGRAM(s) Oral two times a day  ceFAZolin   IVPB 2000 milliGRAM(s) IV Intermittent every 8 hours  chlorhexidine 4% Liquid 1 Application(s) Topical <User Schedule>  gabapentin 100 milliGRAM(s) Oral three times a day  lactobacillus acidophilus 1 Tablet(s) Oral two times a day  lidocaine   4% Patch 1 Patch Transdermal daily  metoprolol succinate ER 25 milliGRAM(s) Oral daily  midodrine. 5 milliGRAM(s) Oral once  pantoprazole    Tablet 40 milliGRAM(s) Oral before breakfast  sacubitril 24 mG/valsartan 26 mG 1 Tablet(s) Oral two times a day  spironolactone 25 milliGRAM(s) Oral daily    MEDICATIONS  (PRN):  acetaminophen     Tablet .. 650 milliGRAM(s) Oral every 6 hours PRN Temp greater or equal to 38C (100.4F), Mild Pain (1 - 3)  ALBUTerol    90 MICROgram(s) HFA Inhaler 2 Puff(s) Inhalation every 6 hours PRN Shortness of Breath and/or Wheezing  aluminum hydroxide/magnesium hydroxide/simethicone Suspension 30 milliLiter(s) Oral every 4 hours PRN Dyspepsia  guaiFENesin Oral Liquid (Sugar-Free) 200 milliGRAM(s) Oral every 6 hours PRN Cough  melatonin 3 milliGRAM(s) Oral at bedtime PRN Insomnia  ondansetron Injectable 4 milliGRAM(s) IV Push every 8 hours PRN Nausea and/or Vomiting  simethicone 80 milliGRAM(s) Chew three times a day PRN Indigestion  sodium chloride 0.9% lock flush 10 milliLiter(s) IV Push every 1 hour PRN Pre/post blood products, medications, blood draw, and to maintain line patency         Vitals log        ICU Vital Signs Last 24 Hrs  T(C): 36.4 (21 Feb 2022 04:30), Max: 37.7 (20 Feb 2022 20:33)  T(F): 97.6 (21 Feb 2022 04:30), Max: 99.8 (20 Feb 2022 20:33)  HR: 92 (21 Feb 2022 04:30) (92 - 98)  BP: 128/87 (21 Feb 2022 04:30) (79/54 - 128/87)  BP(mean): --  ABP: --  ABP(mean): --  RR: 18 (21 Feb 2022 04:30) (17 - 18)  SpO2: 92% (21 Feb 2022 04:30) (92% - 96%)           Input and Output:  I&O's Detail    19 Feb 2022 07:01  -  20 Feb 2022 07:00  --------------------------------------------------------  IN:  Total IN: 0 mL    OUT:    Incontinent per Condom Catheter (mL): 700 mL    Voided (mL): 400 mL  Total OUT: 1100 mL    Total NET: -1100 mL      20 Feb 2022 07:01  -  21 Feb 2022 06:17  --------------------------------------------------------  IN:  Total IN: 0 mL    OUT:    Incontinent per Condom Catheter (mL): 400 mL    Voided (mL): 600 mL  Total OUT: 1000 mL    Total NET: -1000 mL          Lab Data                        16.8   6.17  )-----------( 189      ( 19 Feb 2022 08:07 )             52.2     02-19    135  |  96  |  95<H>  ----------------------------<  115<H>  5.7<H>   |  36<H>  |  1.80<H>    Ca    7.9<L>      19 Feb 2022 09:28              Review of Systems	      Objective     Physical Examination  heart s1s2  lung dec BS  abd soft        Pertinent Lab findings & Imaging      Leo:  NO   Adequate UO     I&O's Detail    19 Feb 2022 07:01  -  20 Feb 2022 07:00  --------------------------------------------------------  IN:  Total IN: 0 mL    OUT:    Incontinent per Condom Catheter (mL): 700 mL    Voided (mL): 400 mL  Total OUT: 1100 mL    Total NET: -1100 mL      20 Feb 2022 07:01  -  21 Feb 2022 06:17  --------------------------------------------------------  IN:  Total IN: 0 mL    OUT:    Incontinent per Condom Catheter (mL): 400 mL    Voided (mL): 600 mL  Total OUT: 1000 mL    Total NET: -1000 mL               Discussed with:     Cultures:	        Radiology                            
Date/Time Patient Seen:  		  Referring MD:   Data Reviewed	       Patient is a 61y old  Male who presents with a chief complaint of SOB (23 Feb 2022 12:34)      Subjective/HPI     PAST MEDICAL & SURGICAL HISTORY:  Heart failure, systolic    CAD (coronary artery disease)    Hypertension    Nonischemic cardiomyopathy    COPD, moderate    2019 novel coronavirus disease (COVID-19)    Substance abuse    HLD (hyperlipidemia)    Cardiac LV ejection fraction 10-20%    No significant past surgical history    AICD (automatic cardioverter/defibrillator) present    Cardiac LV ejection fraction 10-20%          Medication list         MEDICATIONS  (STANDING):  aMIOdarone    Tablet 200 milliGRAM(s) Oral daily  apixaban 5 milliGRAM(s) Oral every 12 hours  aspirin enteric coated 81 milliGRAM(s) Oral daily  atorvastatin 40 milliGRAM(s) Oral at bedtime  buMETAnide 2 milliGRAM(s) Oral two times a day  ceFAZolin   IVPB 2000 milliGRAM(s) IV Intermittent every 8 hours  chlorhexidine 4% Liquid 1 Application(s) Topical <User Schedule>  gabapentin 100 milliGRAM(s) Oral three times a day  lactobacillus acidophilus 1 Tablet(s) Oral two times a day  lidocaine   4% Patch 1 Patch Transdermal daily  metoprolol succinate ER 25 milliGRAM(s) Oral daily  midodrine. 5 milliGRAM(s) Oral once  pantoprazole    Tablet 40 milliGRAM(s) Oral before breakfast  sodium chloride 0.45%. 1000 milliLiter(s) (50 mL/Hr) IV Continuous <Continuous>  spironolactone 25 milliGRAM(s) Oral daily    MEDICATIONS  (PRN):  acetaminophen     Tablet .. 650 milliGRAM(s) Oral every 6 hours PRN Temp greater or equal to 38C (100.4F), Mild Pain (1 - 3)  ALBUTerol    90 MICROgram(s) HFA Inhaler 2 Puff(s) Inhalation every 6 hours PRN Shortness of Breath and/or Wheezing  aluminum hydroxide/magnesium hydroxide/simethicone Suspension 30 milliLiter(s) Oral every 4 hours PRN Dyspepsia  guaiFENesin Oral Liquid (Sugar-Free) 200 milliGRAM(s) Oral every 6 hours PRN Cough  melatonin 3 milliGRAM(s) Oral at bedtime PRN Insomnia  ondansetron Injectable 4 milliGRAM(s) IV Push every 8 hours PRN Nausea and/or Vomiting  simethicone 80 milliGRAM(s) Chew three times a day PRN Indigestion  sodium chloride 0.9% lock flush 10 milliLiter(s) IV Push every 1 hour PRN Pre/post blood products, medications, blood draw, and to maintain line patency         Vitals log        ICU Vital Signs Last 24 Hrs  T(C): 37 (24 Feb 2022 05:12), Max: 37.4 (23 Feb 2022 19:56)  T(F): 98.6 (24 Feb 2022 05:12), Max: 99.3 (23 Feb 2022 19:56)  HR: 103 (24 Feb 2022 05:12) (94 - 103)  BP: 104/72 (24 Feb 2022 05:12) (93/65 - 118/71)  BP(mean): --  ABP: --  ABP(mean): --  RR: 17 (24 Feb 2022 05:12) (17 - 18)  SpO2: 94% (24 Feb 2022 05:12) (90% - 94%)           Input and Output:  I&O's Detail    22 Feb 2022 07:01  -  23 Feb 2022 07:00  --------------------------------------------------------  IN:  Total IN: 0 mL    OUT:    Incontinent per Condom Catheter (mL): 1600 mL  Total OUT: 1600 mL    Total NET: -1600 mL      23 Feb 2022 07:01  -  24 Feb 2022 06:33  --------------------------------------------------------  IN:    IV PiggyBack: 50 mL  Total IN: 50 mL    OUT:    Incontinent per Condom Catheter (mL): 2600 mL    Voided (mL): 700 mL  Total OUT: 3300 mL    Total NET: -3250 mL          Lab Data                        13.6   5.52  )-----------( 154      ( 22 Feb 2022 07:55 )             43.7     02-22    141  |  104  |  59<H>  ----------------------------<  111<H>  4.4   |  33<H>  |  1.20    Ca    8.2<L>      22 Feb 2022 07:55              Review of Systems	      Objective     Physical Examination    heart s1s2  lung dec bS  abd soft      Pertinent Lab findings & Imaging      Leo:  NO   Adequate UO     I&O's Detail    22 Feb 2022 07:01  -  23 Feb 2022 07:00  --------------------------------------------------------  IN:  Total IN: 0 mL    OUT:    Incontinent per Condom Catheter (mL): 1600 mL  Total OUT: 1600 mL    Total NET: -1600 mL      23 Feb 2022 07:01  -  24 Feb 2022 06:33  --------------------------------------------------------  IN:    IV PiggyBack: 50 mL  Total IN: 50 mL    OUT:    Incontinent per Condom Catheter (mL): 2600 mL    Voided (mL): 700 mL  Total OUT: 3300 mL    Total NET: -3250 mL               Discussed with:     Cultures:	        Radiology                            
Date/Time Patient Seen:  		  Referring MD:   Data Reviewed	       Patient is a 61y old  Male who presents with a chief complaint of SOB (24 Feb 2022 11:34)      Subjective/HPI     PAST MEDICAL & SURGICAL HISTORY:  Heart failure, systolic    CAD (coronary artery disease)    Hypertension    Nonischemic cardiomyopathy    COPD, moderate    2019 novel coronavirus disease (COVID-19)    Substance abuse    HLD (hyperlipidemia)    Cardiac LV ejection fraction 10-20%    No significant past surgical history    AICD (automatic cardioverter/defibrillator) present    Cardiac LV ejection fraction 10-20%          Medication list         MEDICATIONS  (STANDING):  aMIOdarone    Tablet 200 milliGRAM(s) Oral daily  apixaban 5 milliGRAM(s) Oral every 12 hours  aspirin enteric coated 81 milliGRAM(s) Oral daily  atorvastatin 40 milliGRAM(s) Oral at bedtime  buMETAnide 2 milliGRAM(s) Oral two times a day  ceFAZolin   IVPB 2000 milliGRAM(s) IV Intermittent every 8 hours  chlorhexidine 4% Liquid 1 Application(s) Topical <User Schedule>  gabapentin 100 milliGRAM(s) Oral three times a day  lactobacillus acidophilus 1 Tablet(s) Oral two times a day  lidocaine   4% Patch 1 Patch Transdermal daily  metoprolol succinate ER 25 milliGRAM(s) Oral daily  midodrine. 5 milliGRAM(s) Oral once  pantoprazole    Tablet 40 milliGRAM(s) Oral before breakfast  sacubitril 24 mG/valsartan 26 mG 1 Tablet(s) Oral two times a day  sodium chloride 0.45%. 1000 milliLiter(s) (50 mL/Hr) IV Continuous <Continuous>  spironolactone 25 milliGRAM(s) Oral daily    MEDICATIONS  (PRN):  acetaminophen     Tablet .. 650 milliGRAM(s) Oral every 6 hours PRN Temp greater or equal to 38C (100.4F), Mild Pain (1 - 3)  ALBUTerol    90 MICROgram(s) HFA Inhaler 2 Puff(s) Inhalation every 6 hours PRN Shortness of Breath and/or Wheezing  aluminum hydroxide/magnesium hydroxide/simethicone Suspension 30 milliLiter(s) Oral every 4 hours PRN Dyspepsia  guaiFENesin Oral Liquid (Sugar-Free) 200 milliGRAM(s) Oral every 6 hours PRN Cough  melatonin 3 milliGRAM(s) Oral at bedtime PRN Insomnia  ondansetron Injectable 4 milliGRAM(s) IV Push every 8 hours PRN Nausea and/or Vomiting  simethicone 80 milliGRAM(s) Chew three times a day PRN Indigestion  sodium chloride 0.9% lock flush 10 milliLiter(s) IV Push every 1 hour PRN Pre/post blood products, medications, blood draw, and to maintain line patency         Vitals log        ICU Vital Signs Last 24 Hrs  T(C): 36.8 (24 Feb 2022 20:02), Max: 36.8 (24 Feb 2022 20:02)  T(F): 98.3 (24 Feb 2022 20:02), Max: 98.3 (24 Feb 2022 20:02)  HR: 96 (24 Feb 2022 20:02) (95 - 96)  BP: 95/60 (25 Feb 2022 05:44) (95/60 - 109/75)  BP(mean): --  ABP: --  ABP(mean): --  RR: 18 (24 Feb 2022 20:02) (17 - 18)  SpO2: 96% (24 Feb 2022 20:02) (92% - 96%)           Input and Output:  I&O's Detail    23 Feb 2022 07:01  -  24 Feb 2022 07:00  --------------------------------------------------------  IN:    IV PiggyBack: 100 mL    sodium chloride 0.45%: 50 mL  Total IN: 150 mL    OUT:    Incontinent per Condom Catheter (mL): 2600 mL    Voided (mL): 700 mL  Total OUT: 3300 mL    Total NET: -3150 mL      24 Feb 2022 07:01  -  25 Feb 2022 06:05  --------------------------------------------------------  IN:  Total IN: 0 mL    OUT:    Incontinent per Condom Catheter (mL): 800 mL  Total OUT: 800 mL    Total NET: -800 mL          Lab Data                        13.0   6.97  )-----------( 174      ( 24 Feb 2022 07:52 )             42.0     02-24    139  |  105  |  39<H>  ----------------------------<  132<H>  4.4   |  29  |  1.20    Ca    8.8      24 Feb 2022 07:52    TPro  6.0  /  Alb  2.3<L>  /  TBili  1.2  /  DBili  x   /  AST  72<H>  /  ALT  22  /  AlkPhos  552<H>  02-24            Review of Systems	      Objective     Physical Examination      heart s1s2  lung dec BS  abd soft    Pertinent Lab findings & Imaging      Leo:  NO   Adequate UO     I&O's Detail    23 Feb 2022 07:01  -  24 Feb 2022 07:00  --------------------------------------------------------  IN:    IV PiggyBack: 100 mL    sodium chloride 0.45%: 50 mL  Total IN: 150 mL    OUT:    Incontinent per Condom Catheter (mL): 2600 mL    Voided (mL): 700 mL  Total OUT: 3300 mL    Total NET: -3150 mL      24 Feb 2022 07:01  -  25 Feb 2022 06:05  --------------------------------------------------------  IN:  Total IN: 0 mL    OUT:    Incontinent per Condom Catheter (mL): 800 mL  Total OUT: 800 mL    Total NET: -800 mL               Discussed with:     Cultures:	        Radiology                            
[facility], Division of Infectious Diseases  MARINA Mccray Y. Patel, S. Shah, G. Casimir  [phone]  ([phone] - weekdays after 5pm and weekends)    Name: DOROTHY VOSS  Age/Gender: 61y Male  MRN: [id number]    Interval History:  Patient seen this morning.   Feels ok, no new complaints.  Notes reviewed.   No concerning overnight events.  Afebrile.     Allergies: No Known Allergies    Objective:  Vitals:   T(F): 98.3 (02-24-22 @ 20:02), Max: 98.3 (02-24-22 @ 20:02)  HR: 96 (02-24-22 @ 20:02) (95 - 96)  BP: 95/60 (02-25-22 @ 05:44) (95/60 - 109/75)  RR: 18 (02-24-22 @ 20:02) (17 - 18)  SpO2: 96% (02-24-22 @ 20:02) (92% - 96%)  Physical Examination:  General: no acute distress, nontoxic appearing  HEENT: NC/AT, anicteric, neck supple  Respiratory: decreased breath sounds b/l  Cardiovascular: S1 and S2 present  Gastrointestinal: soft, nontender, nondistended  Extremities: no edema, no cyanosis  Skin: no visible rash, healing excoriations on arms  Lines: RUE PICC line     Laboratory Studies:  CBC:                       13.9   6.33  )-----------( 182      ( 25 Feb 2022 08:56 )             45.2     WBC Trend:  6.33 02-25-22 @ 08:56  6.97 02-24-22 @ 07:52  5.52 02-22-22 @ 07:55  6.03 02-22-22 @ 00:43  5.24 02-21-22 @ 08:04  6.17 02-19-22 @ 08:07    CMP: 02-25    141  |  106  |  32<H>  ----------------------------<  105<H>  4.7   |  30  |  1.20    Ca    9.0      25 Feb 2022 08:56    TPro  6.0  /  Alb  2.3<L>  /  TBili  1.2  /  DBili  x   /  AST  72<H>  /  ALT  22  /  AlkPhos  552<H>  02-24    LIVER FUNCTIONS - ( 24 Feb 2022 07:52 )  Alb: 2.3 g/dL / Pro: 6.0 g/dL / ALK PHOS: 552 U/L / ALT: 22 U/L / AST: 72 U/L / GGT: x           Microbiology: reviewed   2/25 - COVID-19 PCR - negative    Culture - Blood (collected 02-23-22 @ 18:14)  Source: .Blood Blood-Peripheral  Preliminary Report (02-24-22 @ 19:01):    No growth to date.    Culture - Blood (collected 02-23-22 @ 18:14)  Source: .Blood Blood-Peripheral  Preliminary Report (02-24-22 @ 19:01):    No growth to date.    Culture - Blood (collected 02-22-22 @ 06:35)  Source: .Blood Blood  Preliminary Report (02-23-22 @ 07:01):    No growth to date.    Culture - Blood (collected 02-22-22 @ 06:35)  Source: .Blood Blood  Preliminary Report (02-23-22 @ 07:01):    No growth to date.    Culture - Urine (collected 02-15-22 @ 16:24)  Source: Clean Catch Clean Catch (Midstream)  Final Report (02-16-22 @ 13:24):    No growth    Culture - Blood (collected 02-14-22 @ 12:17)  Source: .Blood Blood-Peripheral  Final Report (02-19-22 @ 13:00):    No Growth Final    Culture - Blood (collected 02-12-22 @ 12:06)  Source: .Blood Blood-Peripheral  Gram Stain (02-13-22 @ 08:12):    Growth in aerobic bottle: Gram Positive Cocci in Clusters    Growth in anaerobic bottle: Gram Positive Cocci in Clusters  Final Report (02-14-22 @ 07:16):    Growth in aerobic and anaerobic bottles: Staphylococcus aureus    See previous culture [id number]    Culture - Blood (collected 02-12-22 @ 12:06)  Source: .Blood Blood-Peripheral  Gram Stain (02-13-22 @ 08:15):    Growth in aerobic bottle: Gram Positive Cocci in Clusters    Growth in anaerobic bottle: Gram Positive Cocci in Clusters  Final Report (02-14-22 @ 07:15):    Growth in aerobic and anaerobic bottles: Staphylococcus aureus    See previous culture [id number]    Culture - Blood (collected 02-11-22 @ 16:19)  Source: .Blood Blood-Peripheral  Gram Stain (02-12-22 @ 07:00):    Growth in aerobic bottle: Gram Positive Cocci in Clusters    Growth in anaerobic bottle: Gram Positive Cocci in Clusters  Final Report (02-14-22 @ 07:13):    Growth in aerobic and anaerobic bottles: Staphylococcus aureus    ***Blood Panel PCR results on this specimen are available    approximately 3 hours after the Gram stain result.***    Gram stain, PCR, and/or culture results may not always    correspond dueto difference in methodologies.    ************************************************************    This PCR assay was performed by multiplex PCR. This    Assay tests for 66 bacterial and resistance gene targets.    Please refer to the [facility] test directory    at [email or web address] for details.  Organism: Blood Culture PCR  Staphylococcus aureus (02-14-22 @ 07:13)  Organism: Staphylococcus aureus (02-14-22 @ 07:13)      -  Ampicillin/Sulbactam: S <=8/4      -  Cefazolin: S <=4      -  Clindamycin: R <=0.25 This isolate is presumed to be clindamycin resistant based on detection of inducible resistance. Clindamycin may still be effective in some patients.      -  Erythromycin: R >4      -  Gentamicin: S <=1 Should not be used as monotherapy      -  Oxacillin: S 0.5      -  Penicillin: R 4      -  Rifampin: S <=1 Should not be used as monotherapy      -  Tetra/Doxy: S <=1      -  Trimethoprim/Sulfamethoxazole: S <=0.5/9.5      -  Vancomycin: S 1      Method Type: BAY  Organism: Blood Culture PCR (02-14-22 @ 07:13)      -  Staphylococcus aureus: Detec Any isolate of Staphylococcus aureus from a blood culture is NOT considered a contaminant.      Method Type: PCR    Culture - Blood (collected 02-11-22 @ 16:19)  Source: .Blood Blood-Peripheral  Gram Stain (02-12-22 @ 06:59):    Growth in aerobic bottle: Gram Positive Cocci in Clusters    Growth in anaerobic bottle: Gram Positive Cocci in Clusters  Final Report (02-14-22 @ 07:13):    Growth in aerobic and anaerobic bottles: Staphylococcus aureus    See previous culture [id number]    Radiology: reviewed   CT Neck Soft Tissue w/ IV Cont (02.24.22 @ 12:17) >IMPRESSION: Multiple carious maxillary and mandibular teeth with periapical  lucencies. In particular, there is dehiscence of the buccal cortex of the right mandible at the level of carious right mandibular canine and first  premolar. No rim-enhancing fluid collection.    Medications:  acetaminophen     Tablet .. 650 milliGRAM(s) Oral every 6 hours PRN  ALBUTerol    90 MICROgram(s) HFA Inhaler 2 Puff(s) Inhalation every 6 hours PRN  aluminum hydroxide/magnesium hydroxide/simethicone Suspension 30 milliLiter(s) Oral every 4 hours PRN  aMIOdarone    Tablet 200 milliGRAM(s) Oral daily  apixaban 5 milliGRAM(s) Oral every 12 hours  aspirin enteric coated 81 milliGRAM(s) Oral daily  atorvastatin 40 milliGRAM(s) Oral at bedtime  buMETAnide 2 milliGRAM(s) Oral two times a day  ceFAZolin   IVPB 2000 milliGRAM(s) IV Intermittent every 8 hours  chlorhexidine 4% Liquid 1 Application(s) Topical <User Schedule>  gabapentin 100 milliGRAM(s) Oral three times a day  guaiFENesin Oral Liquid (Sugar-Free) 200 milliGRAM(s) Oral every 6 hours PRN  lactobacillus acidophilus 1 Tablet(s) Oral two times a day  lidocaine   4% Patch 1 Patch Transdermal daily  melatonin 3 milliGRAM(s) Oral at bedtime PRN  metoprolol succinate ER 25 milliGRAM(s) Oral daily  midodrine. 5 milliGRAM(s) Oral once  ondansetron Injectable 4 milliGRAM(s) IV Push every 8 hours PRN  pantoprazole    Tablet 40 milliGRAM(s) Oral before breakfast  sacubitril 24 mG/valsartan 26 mG 1 Tablet(s) Oral two times a day  simethicone 80 milliGRAM(s) Chew three times a day PRN  sodium chloride 0.9% lock flush 10 milliLiter(s) IV Push every 1 hour PRN  spironolactone 25 milliGRAM(s) Oral daily    Antimicrobials:  ceFAZolin   IVPB 2000 milliGRAM(s) IV Intermittent every 8 hours  
PROGRESS NOTE  Patient is a 61y old  Male who presents with a chief complaint of SOB (22 Feb 2022 14:19)  Chart and available morning labs /imaging are reviewed electronically , urgent issues addressed . More information  is being added upon completion of rounds , when more information is collected and management discussed with consultants , medical staff and social service/case management on the floor     OVERNIGHT  No new issues reported by medical staff . All above noted Patient is resting in a bed comfortably . .No distress noted   Had fever 100.5 and 100.7 ,ID is following ,d/c is on hold   HPI:  61 yr old male ,known to me from [facility]  with med hx of  CAD, dilated cardiomyopathy, s/p ICD, atrial flutter on Eliquis, substance abuse was admitted initially from 12/24 for decompensated CHF, was on Milrinone and Bumex drips, left AMA on 12/31 and returned to the ED later in the day as he had to take care of personal matter. He reported shortness of breath and right arm and foot pain on presentation. Cardiology and Heart Failure specialist were consulted, he was placed on oral Bumex., subsequently transitioned to Bumex gtt. Bumex drip stopped on 1/12. Stopped  Milrinone 1/14 transitioned to oral Torsemide , metoprolol . Counseling given re diet and medication compliance .Not candidate for advanced therapies due to smoking/cocaine use history and non-compliance. Patient reported he was recently evicted from his apartment and is homeless, social work consult placed. Seen by PT and recommend  [facility]. The patient was medically stable for discharge.  Subsequently pt developed COVID-19 infection was admitted to [facility] and discharged a few days ago, now returns to ER from rehab  because of increasing sob and fevers .Found to have positive COVID test ,seen by ID & pulm consult requested Palliative care consult requested ,to discuss advance directives and complete MOLST  (11 Feb 2022 13:41)    PAST MEDICAL & SURGICAL HISTORY:  Heart failure, systolic    CAD (coronary artery disease)    Hypertension    Nonischemic cardiomyopathy    COPD, moderate    2019 novel coronavirus disease (COVID-19)    Substance abuse    HLD (hyperlipidemia)    Cardiac LV ejection fraction 10-20%    AICD (automatic cardioverter/defibrillator) present    Cardiac LV ejection fraction 10-20%        MEDICATIONS  (STANDING):  aMIOdarone    Tablet 200 milliGRAM(s) Oral daily  apixaban 5 milliGRAM(s) Oral every 12 hours  aspirin enteric coated 81 milliGRAM(s) Oral daily  atorvastatin 40 milliGRAM(s) Oral at bedtime  buMETAnide 2 milliGRAM(s) Oral two times a day  ceFAZolin   IVPB 2000 milliGRAM(s) IV Intermittent every 8 hours  chlorhexidine 4% Liquid 1 Application(s) Topical <User Schedule>  gabapentin 100 milliGRAM(s) Oral three times a day  lactobacillus acidophilus 1 Tablet(s) Oral two times a day  lidocaine   4% Patch 1 Patch Transdermal daily  metoprolol succinate ER 25 milliGRAM(s) Oral daily  midodrine. 5 milliGRAM(s) Oral once  pantoprazole    Tablet 40 milliGRAM(s) Oral before breakfast  spironolactone 25 milliGRAM(s) Oral daily    MEDICATIONS  (PRN):  acetaminophen     Tablet .. 650 milliGRAM(s) Oral every 6 hours PRN Temp greater or equal to 38C (100.4F), Mild Pain (1 - 3)  ALBUTerol    90 MICROgram(s) HFA Inhaler 2 Puff(s) Inhalation every 6 hours PRN Shortness of Breath and/or Wheezing  aluminum hydroxide/magnesium hydroxide/simethicone Suspension 30 milliLiter(s) Oral every 4 hours PRN Dyspepsia  guaiFENesin Oral Liquid (Sugar-Free) 200 milliGRAM(s) Oral every 6 hours PRN Cough  melatonin 3 milliGRAM(s) Oral at bedtime PRN Insomnia  ondansetron Injectable 4 milliGRAM(s) IV Push every 8 hours PRN Nausea and/or Vomiting  simethicone 80 milliGRAM(s) Chew three times a day PRN Indigestion  sodium chloride 0.9% lock flush 10 milliLiter(s) IV Push every 1 hour PRN Pre/post blood products, medications, blood draw, and to maintain line patency      OBJECTIVE    T(C): 36.8 (02-22-22 @ 12:01), Max: 38.2 (02-21-22 @ 20:11)  HR: 80 (02-22-22 @ 12:01) (78 - 97)  BP: 97/64 (02-22-22 @ 12:01) (95/- - 126/88)  RR: 18 (02-22-22 @ 12:01) (18 - 20)  SpO2: 98% (02-22-22 @ 12:01) (90% - 98%)  Wt(kg): --  I&O's Summary    21 Feb 2022 07:01  -  22 Feb 2022 07:00  --------------------------------------------------------  IN: 0 mL / OUT: 1700 mL / NET: -1700 mL          REVIEW OF SYSTEMS:  CONSTITUTIONAL: No fever, weight loss, or fatigue  EYES: No eye pain, visual disturbances, or discharge  ENMT:   No sinus or throat pain  NECK: No pain or stiffness  RESPIRATORY: No cough, wheezing, chills or hemoptysis; No shortness of breath  CARDIOVASCULAR: No chest pain, palpitations, dizziness, or leg swelling  GASTROINTESTINAL: No abdominal pain. No nausea, vomiting; No diarrhea or constipation. No melena or hematochezia.  GENITOURINARY: No dysuria, frequency, hematuria, or incontinence  NEUROLOGICAL: No headaches, memory loss, loss of strength, numbness, or tremors  SKIN: No itching, burning, rashes, or lesions   MUSCULOSKELETAL: No joint pain or swelling; No muscle, back, or extremity pain    PHYSICAL EXAM:  Appearance: NAD. VS past 24 hrs -as above   HEENT:   Moist oral mucosa. Conjunctiva clear b/l.   Neck : supple  Respiratory: Lungs CTAB.  Gastrointestinal:  Soft, nontender. No rebound. No rigidity. BS present	  Cardiovascular: RRR ,S1S2 present  Neurologic: Non-focal. Moving all extremities.  Extremities: No edema. No erythema. No calf tenderness.  Skin: No rashes, No ecchymoses, No cyanosis.	  wounds ,skin lesions-See skin assesment flow sheet   LABS:                        13.6   5.52  )-----------( 154      ( 22 Feb 2022 07:55 )             43.7     02-22    141  |  104  |  59<H>  ----------------------------<  111<H>  4.4   |  33<H>  |  1.20    Ca    8.2<L>      22 Feb 2022 07:55    TPro  5.3<L>  /  Alb  2.1<L>  /  TBili  0.8  /  DBili  x   /  AST  53<H>  /  ALT  20  /  AlkPhos  414<H>  02-22    CAPILLARY BLOOD GLUCOSE              Culture - Urine (collected 15 Feb 2022 16:24)  Source: Clean Catch Clean Catch (Midstream)  Final Report (16 Feb 2022 13:24):    No growth    Culture - Blood (collected 14 Feb 2022 12:17)  Source: .Blood Blood-Peripheral  Final Report (19 Feb 2022 13:00):    No Growth Final      RADIOLOGY & ADDITIONAL TESTS:   reviewed elctronically  ASSESSMENT/PLAN: 	    25 minutes aggregate time was spent on this visit, 50% visit time spent in care co-ordination with other attendings and counselling patient .I have discussed care plan with patient / HCP/family member ,who expressed understanding of problems treatment and their effect and side effects, alternatives in details. I have asked if they have any questions and concerns and appropriately addressed them to best of my ability. 
     DOROTHY VOSS is a 61yMale , patient examined and chart reviewed.     INTERVAL HPI/ OVERNIGHT EVENTS:   Refused PICC line placement.  On 4L NC.   Afebrile.    PAST MEDICAL & SURGICAL HISTORY:  Heart failure, systolic  CAD (coronary artery disease)  Hypertension  Nonischemic cardiomyopathy  COPD, moderate  2019 novel coronavirus disease (COVID-19)  Substance abuse  HLD (hyperlipidemia)  Cardiac LV ejection fraction 10-20%  AICD (automatic cardioverter/defibrillator) present  Cardiac LV ejection fraction 10-20%      For details regarding the patient's social history, family history, and other miscellaneous elements, please refer the initial infectious diseases consultation and/or the admitting history and physical examination for this admission.    ROS:  CONSTITUTIONAL:  Negative fever or chills + back pain  EYES:  Negative  blurry vision or double vision  CARDIOVASCULAR:  Negative for chest pain or palpitations  RESPIRATORY:  Negative for cough, wheezing, or SOB   GASTROINTESTINAL:  Negative for nausea, vomiting, diarrhea, constipation, or abdominal pain  GENITOURINARY:  Negative frequency, urgency or dysuria  NEUROLOGIC:  No headache, confusion, dizziness, lightheadedness  All other systems were reviewed and are negative     ALLERGIES:  pork (Other)      Current inpatient medications :    ANTIBIOTICS/RELEVANT:  ceFAZolin   IVPB 1000 milliGRAM(s) IV Intermittent every 8 hours    MEDICATIONS  (STANDING):  aMIOdarone    Tablet 200 milliGRAM(s) Oral daily  apixaban 5 milliGRAM(s) Oral every 12 hours  aspirin enteric coated 81 milliGRAM(s) Oral daily  atorvastatin 40 milliGRAM(s) Oral at bedtime  buMETAnide 2 milliGRAM(s) Oral two times a day  gabapentin 100 milliGRAM(s) Oral three times a day  lactobacillus acidophilus 1 Tablet(s) Oral two times a day  lidocaine   4% Patch 1 Patch Transdermal daily  metoprolol succinate ER 25 milliGRAM(s) Oral daily  pantoprazole    Tablet 40 milliGRAM(s) Oral before breakfast  sacubitril 24 mG/valsartan 26 mG 1 Tablet(s) Oral two times a day  spironolactone 25 milliGRAM(s) Oral daily    MEDICATIONS  (PRN):  acetaminophen     Tablet .. 650 milliGRAM(s) Oral every 6 hours PRN Temp greater or equal to 38C (100.4F), Mild Pain (1 - 3)  ALBUTerol    90 MICROgram(s) HFA Inhaler 2 Puff(s) Inhalation every 6 hours PRN Shortness of Breath and/or Wheezing  aluminum hydroxide/magnesium hydroxide/simethicone Suspension 30 milliLiter(s) Oral every 4 hours PRN Dyspepsia  guaiFENesin Oral Liquid (Sugar-Free) 200 milliGRAM(s) Oral every 6 hours PRN Cough  melatonin 3 milliGRAM(s) Oral at bedtime PRN Insomnia  ondansetron Injectable 4 milliGRAM(s) IV Push every 8 hours PRN Nausea and/or Vomiting  simethicone 80 milliGRAM(s) Chew three times a day PRN Indigestion          Objective:  Vital Signs Last 24 Hrs  T(C): 36.6 (18 Feb 2022 11:51), Max: 36.6 (17 Feb 2022 18:35)  T(F): 97.9 (18 Feb 2022 11:51), Max: 97.9 (18 Feb 2022 11:51)  HR: 70 (18 Feb 2022 11:51) (61 - 71)  BP: 108/70 (18 Feb 2022 11:51) (108/67 - 114/79)  RR: 18 (18 Feb 2022 11:51) (17 - 18)  SpO2: 95% (18 Feb 2022 11:51) (95% - 97%)      Physical Exam:  General: no acute distress  Neck: supple, trachea midline  Lungs: Decreased, no wheeze/rhonchi  Cardiovascular: regular rate and rhythm, S1 S2  Abdomen: soft, nontender,  bowel sounds normal  Neurological: alert and oriented x3  Skin: no rash  Extremities: + edema      LABS:                        14.9   9.70  )-----------( 206      ( 17 Feb 2022 07:27 )             47.2   02-17    134<L>  |  97  |  68<H>  ----------------------------<  213<H>  4.7   |  29  |  1.40<H>    Ca    7.9<L>      17 Feb 2022 07:27      MICROBIOLOGY:  Culture - Urine (collected 15 Feb 2022 16:24)  Source: Clean Catch Clean Catch (Midstream)  Final Report (16 Feb 2022 13:24):    No growth    Culture - Blood (collected 14 Feb 2022 12:17)  Source: .Blood Blood-Peripheral  Preliminary Report (15 Feb 2022 13:02):    No growth to date.    Culture - Blood (collected 12 Feb 2022 12:06)  Source: .Blood Blood-Peripheral  Gram Stain (13 Feb 2022 08:12):    Growth in aerobic bottle: Gram Positive Cocci in Clusters    Growth in anaerobic bottle: Gram Positive Cocci in Clusters  Final Report (14 Feb 2022 07:16):    Growth in aerobic and anaerobic bottles: Staphylococcus aureus    See previous culture [id number]    Culture - Blood (collected 12 Feb 2022 12:06)  Source: .Blood Blood-Peripheral  Gram Stain (13 Feb 2022 08:15):    Growth in aerobic bottle: Gram Positive Cocci in Clusters    Growth in anaerobic bottle: Gram Positive Cocci in Clusters  Final Report (14 Feb 2022 07:15):    Growth in aerobic and anaerobic bottles: Staphylococcus aureus    See previous culture [id number]    Culture - Blood (collected 11 Feb 2022 16:19)  Source: .Blood Blood-Peripheral  Gram Stain (12 Feb 2022 07:00):    Growth in aerobic bottle: Gram Positive Cocci in Clusters    Growth in anaerobic bottle: Gram Positive Cocci in Clusters  Final Report (14 Feb 2022 07:13):    Growth in aerobic and anaerobic bottles: Staphylococcus aureus    ***Blood Panel PCR results on this specimen are available    approximately 3 hours after the Gram stain result.***    Gram stain, PCR, and/or culture results may not always    correspond dueto difference in methodologies.    ************************************************************    This PCR assay was performed by multiplex PCR. This    Assay tests for 66 bacterial and resistance gene targets.    Please refer to the [facility] test directory    at [email or web address] for details.  Organism: Blood Culture PCR  Staphylococcus aureus (14 Feb 2022 07:13)  Organism: Staphylococcus aureus (14 Feb 2022 07:13)      -  Ampicillin/Sulbactam: S <=8/4      -  Cefazolin: S <=4      -  Clindamycin: R <=0.25 This isolate is presumed to be clindamycin resistant based on detection of inducible resistance. Clindamycin may still be effective in some patients.      -  Erythromycin: R >4      -  Gentamicin: S <=1 Should not be used as monotherapy      -  Oxacillin: S 0.5      -  Penicillin: R 4      -  Rifampin: S <=1 Should not be used as monotherapy      -  Tetra/Doxy: S <=1      -  Trimethoprim/Sulfamethoxazole: S <=0.5/9.5      -  Vancomycin: S 1      Method Type: BAY  Organism: Blood Culture PCR (14 Feb 2022 07:13)      -  Staphylococcus aureus: Detec Any isolate of Staphylococcus aureus from a blood culture is NOT considered a contaminant.      Method Type: PCR    Culture - Blood (collected 11 Feb 2022 16:19)  Source: .Blood Blood-Peripheral  Gram Stain (12 Feb 2022 06:59):    Growth in aerobic bottle: Gram Positive Cocci in Clusters    Growth in anaerobic bottle: Gram Positive Cocci in Clusters  Final Report (14 Feb 2022 07:13):    Growth in aerobic and anaerobic bottles: Staphylococcus aureus    See previous culture [id number]    RADIOLOGY & ADDITIONAL STUDIES:  ACC: [id number] EXAM:  XR CHEST PORTABLE IMMED 1V                          PROCEDURE DATE:  02/11/2022          INTERPRETATION:  Portable chest radiograph    CLINICAL INFORMATION: Dyspnea, shortness of breath.    TECHNIQUE:  Portable  AP chest radiograph.    COMPARISON: 1/14/2022 chest .    FINDINGS:  CATHETERS AND TUBES: None    PULMONARY: Unchanged moderate RIGHT pleural effusion and/or basilar   airspace disease obscuring diaphragm contour. RIGHT upper zone and LEFT   lung parenchyma clear..  No pneumothorax.    HEART/VASCULAR: The  heart is grossly enlarged in transverse diameter. No   hilar mass.  Cardiac device wire lead is within right ventricle. .    BONES: Visualized osseous structures are intact.    IMPRESSION:   No interval change..      ACC: [id number] EXAM:  NM MULTI DAY PROCEDURE                        ACC: [id number] EXAM:  NM INFLAMM LOC GALLIUM WB SD                          PROCEDURE DATE:  02/17/2022          INTERPRETATION:  RADIOPHARMACEUTICAL: 4.5 mCi Ga-67 citrate, I.V.   injected on 2/15/2022    CLINICAL INFORMATION: 61 year-old male with staph aureus bacteremia and   back pain, Covid positive, referred to evaluate for infection    TECHNIQUE: Whole-body planar images in the anterior and posterior   projections were obtained 48 hours following administration of   radiopharmaceutical. This study is limited by patient motion. The patient   refused additional imaging.    COMPARISON: No previous gallium scan for comparison    FINDINGS: The study is limited due to patient motion. There is a focal   increased gallium accumulation in the left lateral neck/cervical region   and possibly some diffuse uptake in the buccal cavity. There is a   photopenic area in the left upper chest likely secondary to cardiac   device. There is physiologic tracer distribution in the remainder of the   visualized structures.    IMPRESSION: Limited Gallium scan demonstrates:    Focal increased gallium accumulation in the left lateral neck/cervical   region, nonspecific, may represent site of infection.    Assessment :   61 year old male with a history of CAD, end stage chronic systolic heart failure s/p ICD, atrial flutter s/p DCCV, substance abuse, CKD COVID 19 1/23/22 admitted with Acute hypoxic respiratory failure likely sec CHF exacerbation Markedly elevated BNP. On HFNC with Staph Aureus bacteremia  Source of bacteremia unclear   Prob skin source- pt c/o mild tenderness at old IV sites on left arm though no appreciable induration erythema or palpable cord  Has AICD Echo neg for endocarditis  C/o back pain  Had gallium scan ? cervical discitis  Unable to do MRI due to AICD  COVID 19- pt had been positive since 1/23/22.  Pt is vaccinated against COVID 19.    Plan :  Cont Ancef  2grams q8 x 6 weeks 3/26/22  PICC line-  Pt refused today  Fu repeat blood cultures- NGTD  Off Decadron   Diurese per cardiology  Trend temps and cbc  Wean down 02 requirements as tolerated    I will be away 2/19 to 2/27/22. [facility] will be covering.    Continue with present regiment.  Appropriate use of antibiotics and adverse effects reviewed.      > 35 minutes were spent in direct patient care reviewing notes, medications ,labs data/ imaging , discussion with multidisciplinary team.    Thank you for allowing me to participate in care of your patient .    Kelley Phan MD  Infectious Disease  [phone]
     DOROTHY VOSS is a 61yMale , patient examined and chart reviewed.     INTERVAL HPI/ OVERNIGHT EVENTS:   Remains on HFNC.  Blood cultures with Staph Aureus.    PAST MEDICAL & SURGICAL HISTORY:  Heart failure, systolic  CAD (coronary artery disease)  Hypertension  Nonischemic cardiomyopathy  COPD, moderate  2019 novel coronavirus disease (COVID-19)  Substance abuse  HLD (hyperlipidemia)  Cardiac LV ejection fraction 10-20%  AICD (automatic cardioverter/defibrillator) present  Cardiac LV ejection fraction 10-20%      For details regarding the patient's social history, family history, and other miscellaneous elements, please refer the initial infectious diseases consultation and/or the admitting history and physical examination for this admission.    ROS:  CONSTITUTIONAL:  Negative fever or chills  EYES:  Negative  blurry vision or double vision  CARDIOVASCULAR:  Negative for chest pain or palpitations  RESPIRATORY:  Negative for cough, wheezing, or SOB   GASTROINTESTINAL:  Negative for nausea, vomiting, diarrhea, constipation, or abdominal pain  GENITOURINARY:  Negative frequency, urgency or dysuria  NEUROLOGIC:  No headache, confusion, dizziness, lightheadedness  All other systems were reviewed and are negative     ALLERGIES:  pork (Other)      Current inpatient medications :    ANTIBIOTICS/RELEVANT:  lactobacillus acidophilus 1 Tablet(s) Oral two times a day      acetaminophen     Tablet .. 650 milliGRAM(s) Oral every 6 hours PRN  aluminum hydroxide/magnesium hydroxide/simethicone Suspension 30 milliLiter(s) Oral every 4 hours PRN  aMIOdarone    Tablet 200 milliGRAM(s) Oral daily  apixaban 5 milliGRAM(s) Oral every 12 hours  aspirin enteric coated 81 milliGRAM(s) Oral daily  atorvastatin 40 milliGRAM(s) Oral at bedtime  buDESOnide    Inhalation Suspension 0.5 milliGRAM(s) Inhalation two times a day  buMETAnide Injectable 2 milliGRAM(s) IV Push every 12 hours  dexAMETHasone     Tablet 6 milliGRAM(s) Oral daily  gabapentin 100 milliGRAM(s) Oral three times a day  melatonin 3 milliGRAM(s) Oral at bedtime PRN  metoprolol succinate ER 25 milliGRAM(s) Oral daily  ondansetron Injectable 4 milliGRAM(s) IV Push every 8 hours PRN  pantoprazole    Tablet 40 milliGRAM(s) Oral before breakfast  simethicone 80 milliGRAM(s) Chew three times a day PRN      Objective:    02-11 @ 07:01 - 02-12 @ 07:00  --------------------------------------------------------  IN: 0 mL / OUT: 200 mL / NET: -200 mL    02-12 @ 07:01  -  02-12 @ 19:20  --------------------------------------------------------  IN: 0 mL / OUT: 700 mL / NET: -700 mL      T(C): 36.7 (02-12-22 @ 11:23), Max: 37.6 (02-11-22 @ 21:23)  HR: 70 (02-12-22 @ 17:07) (70 - 97)  BP: 121/88 (02-12-22 @ 11:23) (116/82 - 146/81)  RR: 25 (02-12-22 @ 11:23) (17 - 25)  SpO2: 99% (02-12-22 @ 17:07) (95% - 99%)    Physical Exam:  General: no acute distress HFNC  Neck: supple, trachea midline  Lungs: Decreased, no wheeze/rhonchi  Cardiovascular: regular rate and rhythm, S1 S2  Abdomen: soft, nontender,  bowel sounds normal  Neurological: alert and oriented x3  Skin: no rash  Extremities: + edema      LABS:                          13.6   15.77 )-----------( 173      ( 12 Feb 2022 05:48 )             41.2       02-12    133<L>  |  99  |  58<H>  ----------------------------<  116<H>  5.0   |  28  |  1.60<H>    Ca    8.4<L>      12 Feb 2022 05:48  Phos  4.1     02-12  Mg     2.6     02-12    TPro  5.6<L>  /  Alb  2.3<L>  /  TBili  2.9<H>  /  DBili  x   /  AST  34  /  ALT  42  /  AlkPhos  293<H>  02-12      PT/INR - ( 12 Feb 2022 05:48 )   PT: 30.0 sec;   INR: 2.69 ratio         PTT - ( 11 Feb 2022 10:10 )  PTT:38.7 sec      ABG - ( 11 Feb 2022 20:45 )  pH, Arterial: 7.46  pH, Blood: x     /  pCO2: 34    /  pO2: 78    / HCO3: 24    / Base Excess: 0.4   /  SaO2: 97.3        MICROBIOLOGY:    Culture - Blood (collected 11 Feb 2022 16:19)  Source: .Blood Blood-Peripheral  Gram Stain (12 Feb 2022 07:00):    Growth in aerobic bottle: Gram Positive Cocci in Clusters    Growth in anaerobic bottle: Gram Positive Cocci in Clusters  Preliminary Report (12 Feb 2022 07:00):    Growth in aerobic bottle: Gram Positive Cocci in Clusters    Growth in anaerobic bottle: Gram Positive Cocci in Clusters    ***Blood Panel PCR results on this specimen are available    approximately 3 hours after the Gram stain result.***    Gram stain, PCR, and/or culture results may not always    correspond due to difference in methodologies.    ************************************************************    This PCR assay was performed by multiplex PCR. This    Assay tests for 66 bacterial and resistance gene targets.    Please refer to the [facility] test directory    at [email or web address] for details.  Organism: Blood Culture PCR (12 Feb 2022 05:44)  Organism: Blood Culture PCR (12 Feb 2022 05:44)      -  Staphylococcus aureus: Detec Any isolate of Staphylococcus aureus from a blood culture is NOT considered a contaminant.      Method Type: PCR    Culture - Blood (collected 11 Feb 2022 16:19)  Source: .Blood Blood-Peripheral  Gram Stain (12 Feb 2022 06:59):    Growth in aerobic bottle: Gram Positive Cocci in Clusters    Growth in anaerobic bottle: Gram Positive Cocci in Clusters  Preliminary Report (12 Feb 2022 06:59):    Growth in aerobic bottle: Gram Positive Cocci in Clusters    Growth in anaerobic bottle: Gram Positive Cocci in Clusters      RADIOLOGY & ADDITIONAL STUDIES:  ACC: [id number] EXAM:  XR CHEST PORTABLE IMMED 1V                          PROCEDURE DATE:  02/11/2022          INTERPRETATION:  Portable chest radiograph    CLINICAL INFORMATION: Dyspnea, shortness of breath.    TECHNIQUE:  Portable  AP chest radiograph.    COMPARISON: 1/14/2022 chest .    FINDINGS:  CATHETERS AND TUBES: None    PULMONARY: Unchanged moderate RIGHT pleural effusion and/or basilar   airspace disease obscuring diaphragm contour. RIGHT upper zone and LEFT   lung parenchyma clear..  No pneumothorax.    HEART/VASCULAR: The  heart is grossly enlarged in transverse diameter. No   hilar mass.  Cardiac device wire lead is within right ventricle. .    BONES: Visualized osseous structures are intact.    IMPRESSION:   No interval change..      Assessment :   61 year old male with a history of CAD, end stage chronic systolic heart failure s/p ICD, atrial flutter s/p DCCV, substance abuse, CKD COVID 19 1/23/22 admitted with Acute hypoxic respiratory failure likely sec CHF exacerbation Markedly elevated BNP. On HFNC with Staph Aureus bacteremia  Source of bacteremia unclear   Prob skin source- pt c/o mild tenderness at old IV sites on left arm though no appreciable induration erythema or palpable cord  Has AICD  Doubt resp failure sec COVID 19- pt had been positive since 1/23/22.  Pt is vaccinated against COVID 19.    Plan :   Sp Vanc 1 gram x 1 dose  Start Ancef  Fu cultures  Repeat blood cultures  Cont Decadron 6mg po daily short course  No need for Remdesivir  Diurese per cardiology  Trend temps and cbc  Check echo      Continue with present regiment.  Appropriate use of antibiotics and adverse effects reviewed.      > 35 minutes were spent in direct patient care reviewing notes, medications ,labs data/ imaging , discussion with multidisciplinary team.    Thank you for allowing me to participate in care of your patient .    Kelley Phan MD  Infectious Disease  [phone]
     DOROTHY VOSS is a 61yMale , patient examined and chart reviewed.     INTERVAL HPI/ OVERNIGHT EVENTS:   Remains on HFNC.  No events.    PAST MEDICAL & SURGICAL HISTORY:  Heart failure, systolic  CAD (coronary artery disease)  Hypertension  Nonischemic cardiomyopathy  COPD, moderate  2019 novel coronavirus disease (COVID-19)  Substance abuse  HLD (hyperlipidemia)  Cardiac LV ejection fraction 10-20%  AICD (automatic cardioverter/defibrillator) present  Cardiac LV ejection fraction 10-20%      For details regarding the patient's social history, family history, and other miscellaneous elements, please refer the initial infectious diseases consultation and/or the admitting history and physical examination for this admission.    ROS:  CONSTITUTIONAL:  Negative fever or chills + back pain  EYES:  Negative  blurry vision or double vision  CARDIOVASCULAR:  Negative for chest pain or palpitations  RESPIRATORY:  Negative for cough, wheezing, or SOB   GASTROINTESTINAL:  Negative for nausea, vomiting, diarrhea, constipation, or abdominal pain  GENITOURINARY:  Negative frequency, urgency or dysuria  NEUROLOGIC:  No headache, confusion, dizziness, lightheadedness  All other systems were reviewed and are negative     ALLERGIES:  pork (Other)      Current inpatient medications :    ANTIBIOTICS/RELEVANT:  ceFAZolin   IVPB 1000 milliGRAM(s) IV Intermittent every 8 hours    MEDICATIONS  (STANDING):  aMIOdarone    Tablet 200 milliGRAM(s) Oral daily  apixaban 5 milliGRAM(s) Oral every 12 hours  aspirin enteric coated 81 milliGRAM(s) Oral daily  atorvastatin 40 milliGRAM(s) Oral at bedtime  buDESOnide    Inhalation Suspension 0.5 milliGRAM(s) Inhalation two times a day  buMETAnide Injectable 2 milliGRAM(s) IV Push every 12 hours  dexAMETHasone     Tablet 6 milliGRAM(s) Oral daily  gabapentin 100 milliGRAM(s) Oral three times a day  lactobacillus acidophilus 1 Tablet(s) Oral two times a day  lidocaine   4% Patch 1 Patch Transdermal daily  metoprolol succinate ER 25 milliGRAM(s) Oral daily  pantoprazole    Tablet 40 milliGRAM(s) Oral before breakfast  sacubitril 24 mG/valsartan 26 mG 1 Tablet(s) Oral two times a day  spironolactone 25 milliGRAM(s) Oral daily    MEDICATIONS  (PRN):  acetaminophen     Tablet .. 650 milliGRAM(s) Oral every 6 hours PRN Temp greater or equal to 38C (100.4F), Mild Pain (1 - 3)  aluminum hydroxide/magnesium hydroxide/simethicone Suspension 30 milliLiter(s) Oral every 4 hours PRN Dyspepsia  melatonin 3 milliGRAM(s) Oral at bedtime PRN Insomnia  ondansetron Injectable 4 milliGRAM(s) IV Push every 8 hours PRN Nausea and/or Vomiting  simethicone 80 milliGRAM(s) Chew three times a day PRN Indigestion      Objective:  Vital Signs Last 24 Hrs  T(C): 36.5 (14 Feb 2022 11:45), Max: 37.1 (13 Feb 2022 19:06)  T(F): 97.7 (14 Feb 2022 11:45), Max: 98.7 (13 Feb 2022 19:06)  HR: 64 (14 Feb 2022 11:45) (63 - 70)  BP: 116/75 (14 Feb 2022 11:45) (111/77 - 116/83)  RR: 19 (14 Feb 2022 11:45) (18 - 21)  SpO2: 97% (14 Feb 2022 11:45) (96% - 99%)      Physical Exam:  General: no acute distress HFNC  Neck: supple, trachea midline  Lungs: Decreased, no wheeze/rhonchi  Cardiovascular: regular rate and rhythm, S1 S2  Abdomen: soft, nontender,  bowel sounds normal  Neurological: alert and oriented x3  Skin: no rash  Extremities: + edema      LABS:                        13.7   9.75  )-----------( 180      ( 14 Feb 2022 07:48 )             44.3   02-14    135  |  98  |  71<H>  ----------------------------<  130<H>  4.8   |  32<H>  |  1.40<H>    Ca    9.0      14 Feb 2022 07:48      MICROBIOLOGY:    Culture - Blood (collected 12 Feb 2022 12:06)  Source: .Blood Blood-Peripheral  Gram Stain (13 Feb 2022 08:12):    Growth in aerobic bottle: Gram Positive Cocci in Clusters    Growth in anaerobic bottle: Gram Positive Cocci in Clusters  Final Report (14 Feb 2022 07:16):    Growth in aerobic and anaerobic bottles: Staphylococcus aureus    See previous culture [id number]    Culture - Blood (collected 12 Feb 2022 12:06)  Source: .Blood Blood-Peripheral  Gram Stain (13 Feb 2022 08:15):    Growth in aerobic bottle: Gram Positive Cocci in Clusters    Growth in anaerobic bottle: Gram Positive Cocci in Clusters  Final Report (14 Feb 2022 07:15):    Growth in aerobic and anaerobic bottles: Staphylococcus aureus    See previous culture [id number]    Culture - Blood (collected 11 Feb 2022 16:19)  Source: .Blood Blood-Peripheral  Gram Stain (12 Feb 2022 07:00):    Growth in aerobic bottle: Gram Positive Cocci in Clusters    Growth in anaerobic bottle: Gram Positive Cocci in Clusters  Final Report (14 Feb 2022 07:13):    Growth in aerobic and anaerobic bottles: Staphylococcus aureus    ***Blood Panel PCR results on this specimen are available    approximately 3 hours after the Gram stain result.***    Gram stain, PCR, and/or culture results may not always    correspond dueto difference in methodologies.    ************************************************************    This PCR assay was performed by multiplex PCR. This    Assay tests for 66 bacterial and resistance gene targets.    Please refer to the [facility] test directory    at [email or web address] for details.  Organism: Blood Culture PCR  Staphylococcus aureus (14 Feb 2022 07:13)  Organism: Staphylococcus aureus (14 Feb 2022 07:13)      -  Ampicillin/Sulbactam: S <=8/4      -  Cefazolin: S <=4      -  Clindamycin: R <=0.25 This isolate is presumed to be clindamycin resistant based on detection of inducible resistance. Clindamycin may still be effective in some patients.      -  Erythromycin: R >4      -  Gentamicin: S <=1 Should not be used as monotherapy      -  Oxacillin: S 0.5      -  Penicillin: R 4      -  Rifampin: S <=1 Should not be used as monotherapy      -  Tetra/Doxy: S <=1      -  Trimethoprim/Sulfamethoxazole: S <=0.5/9.5      -  Vancomycin: S 1      Method Type: BAY  Organism: Blood Culture PCR (14 Feb 2022 07:13)      -  Staphylococcus aureus: Detec Any isolate of Staphylococcus aureus from a blood culture is NOT considered a contaminant.      Method Type: PCR    Culture - Blood (collected 11 Feb 2022 16:19)  Source: .Blood Blood-Peripheral  Gram Stain (12 Feb 2022 06:59):    Growth in aerobic bottle: Gram Positive Cocci in Clusters    Growth in anaerobic bottle: Gram Positive Cocci in Clusters  Final Report (14 Feb 2022 07:13):    Growth in aerobic and anaerobic bottles: Staphylococcus aureus    See previous culture [id number]    RADIOLOGY & ADDITIONAL STUDIES:  ACC: [id number] EXAM:  XR CHEST PORTABLE IMMED 1V                          PROCEDURE DATE:  02/11/2022          INTERPRETATION:  Portable chest radiograph    CLINICAL INFORMATION: Dyspnea, shortness of breath.    TECHNIQUE:  Portable  AP chest radiograph.    COMPARISON: 1/14/2022 chest .    FINDINGS:  CATHETERS AND TUBES: None    PULMONARY: Unchanged moderate RIGHT pleural effusion and/or basilar   airspace disease obscuring diaphragm contour. RIGHT upper zone and LEFT   lung parenchyma clear..  No pneumothorax.    HEART/VASCULAR: The  heart is grossly enlarged in transverse diameter. No   hilar mass.  Cardiac device wire lead is within right ventricle. .    BONES: Visualized osseous structures are intact.    IMPRESSION:   No interval change..      Assessment :   61 year old male with a history of CAD, end stage chronic systolic heart failure s/p ICD, atrial flutter s/p DCCV, substance abuse, CKD COVID 19 1/23/22 admitted with Acute hypoxic respiratory failure likely sec CHF exacerbation Markedly elevated BNP. On HFNC with Staph Aureus bacteremia  Source of bacteremia unclear   Prob skin source- pt c/o mild tenderness at old IV sites on left arm though no appreciable induration erythema or palpable cord  Has AICD  Doubt resp failure sec COVID 19- pt had been positive since 1/23/22.  Pt is vaccinated against COVID 19.  C/o back pain  DERRICK better  Echo neg for endocarditis    Plan :   Will get WBC scan to rule out discitis  Unable to do MRI due to AICD  Cont Ancef, increase to 2grams q8  Fu repeat blood cultures  Cont Decadron 6mg po daily short course  No need for Remdesivir as doubt active COVID 19 infection  Diurese per cardiology  Trend temps and cbc  Wean down 02 requirements as tolerated    Continue with present regiment.  Appropriate use of antibiotics and adverse effects reviewed.      > 35 minutes were spent in direct patient care reviewing notes, medications ,labs data/ imaging , discussion with multidisciplinary team.    Thank you for allowing me to participate in care of your patient .    Kelley Phan MD  Infectious Disease  [phone]
    DOROTHY MCKEONSON    PLV TELE 314 D1    Allergies    No Known Allergies    Intolerances    pork (Other)      PAST MEDICAL & SURGICAL HISTORY:  Heart failure, systolic    CAD (coronary artery disease)    Hypertension    Nonischemic cardiomyopathy    COPD, moderate    2019 novel coronavirus disease (COVID-19)    Substance abuse    HLD (hyperlipidemia)    Cardiac LV ejection fraction 10-20%    AICD (automatic cardioverter/defibrillator) present    Cardiac LV ejection fraction 10-20%        FAMILY HISTORY:  FH: lung cancer        Home Medications:  acetaminophen 325 mg oral tablet: 2 tab(s) orally every 6 hours, As needed, Temp greater or equal to 38C (100.4F), Mild Pain (1 - 3) (13 Feb 2022 10:49)  Aquaphor Healing topical ointment: Apply topically to affected area once a day (13 Feb 2022 10:49)  Bacid (LAC) oral capsule: 2 cap(s) orally once a day (13 Feb 2022 10:49)  DuoNeb 0.5 mg-2.5 mg/3 mL inhalation solution: 3 milliliter(s) inhaled 4 times a day, As Needed (13 Feb 2022 10:49)  furosemide 20 mg oral tablet: 1 tab(s) orally once a day (13 Feb 2022 10:49)  gabapentin 100 mg oral capsule: 1 cap(s) orally 3 times a day (13 Feb 2022 10:49)  melatonin 3 mg oral tablet: 1 tab(s) orally once a day (at bedtime), As needed, Insomnia (13 Feb 2022 10:49)  metoprolol succinate 100 mg oral tablet, extended release: 1 tab(s) orally once a day (13 Feb 2022 10:49)  simethicone 80 mg oral tablet: 2 tab(s) orally every 6 hours, As Needed (13 Feb 2022 10:49)  Symbicort 160 mcg-4.5 mcg/inh inhalation aerosol: 2 puff(s) inhaled 2 times a day (13 Feb 2022 10:49)  Ventolin HFA 90 mcg/inh inhalation aerosol: 2 puff(s) inhaled 3 times a day for 5 days. Start: 02/10/22 (13 Feb 2022 10:49)  Ventolin HFA 90 mcg/inh inhalation aerosol: 2 puff(s) inhaled every 6 hours, As Needed (13 Feb 2022 10:49)      MEDICATIONS  (STANDING):  aMIOdarone    Tablet 200 milliGRAM(s) Oral daily  apixaban 5 milliGRAM(s) Oral every 12 hours  aspirin enteric coated 81 milliGRAM(s) Oral daily  atorvastatin 40 milliGRAM(s) Oral at bedtime  buMETAnide Injectable 2 milliGRAM(s) IV Push every 12 hours  ceFAZolin   IVPB 2000 milliGRAM(s) IV Intermittent every 8 hours  dexAMETHasone     Tablet 6 milliGRAM(s) Oral daily  gabapentin 100 milliGRAM(s) Oral three times a day  lactobacillus acidophilus 1 Tablet(s) Oral two times a day  lidocaine   4% Patch 1 Patch Transdermal daily  metoprolol succinate ER 25 milliGRAM(s) Oral daily  pantoprazole    Tablet 40 milliGRAM(s) Oral before breakfast  sacubitril 24 mG/valsartan 26 mG 1 Tablet(s) Oral two times a day  spironolactone 25 milliGRAM(s) Oral daily    MEDICATIONS  (PRN):  acetaminophen     Tablet .. 650 milliGRAM(s) Oral every 6 hours PRN Temp greater or equal to 38C (100.4F), Mild Pain (1 - 3)  aluminum hydroxide/magnesium hydroxide/simethicone Suspension 30 milliLiter(s) Oral every 4 hours PRN Dyspepsia  melatonin 3 milliGRAM(s) Oral at bedtime PRN Insomnia  ondansetron Injectable 4 milliGRAM(s) IV Push every 8 hours PRN Nausea and/or Vomiting  simethicone 80 milliGRAM(s) Chew three times a day PRN Indigestion      Diet, Consistent Carbohydrate w/Evening Snack:   Soft and Bite Sized (SOFTBTSZ)  Low Sodium  Supplement Feeding Modality:  Oral  Glucerna Shake Cans or Servings Per Day:  1       Frequency:  Two Times a day (02-12-22 @ 11:55) [Active]          Vital Signs Last 24 Hrs  T(C): 36.6 (15 Feb 2022 03:44), Max: 36.8 (14 Feb 2022 17:40)  T(F): 97.8 (15 Feb 2022 03:44), Max: 98.3 (14 Feb 2022 17:40)  HR: 63 (15 Feb 2022 03:44) (61 - 70)  BP: 109/71 (15 Feb 2022 03:44) (109/71 - 120/77)  BP(mean): --  RR: 17 (15 Feb 2022 03:44) (17 - 19)  SpO2: 93% (15 Feb 2022 03:44) (93% - 99%)      02-14-22 @ 07:01  -  02-15-22 @ 07:00  --------------------------------------------------------  IN: 340 mL / OUT: 1925 mL / NET: -1585 mL              LABS:                        13.3   9.76  )-----------( 162      ( 15 Feb 2022 07:53 )             42.3     02-14    135  |  98  |  71<H>  ----------------------------<  130<H>  4.8   |  32<H>  |  1.40<H>    Ca    9.0      14 Feb 2022 07:48                WBC:  WBC Count: 9.76 K/uL (02-15 @ 07:53)  WBC Count: 9.75 K/uL (02-14 @ 07:48)  WBC Count: 11.78 K/uL (02-13 @ 08:21)  WBC Count: 15.77 K/uL (02-12 @ 05:48)  WBC Count: 17.56 K/uL (02-11 @ 10:10)      MICROBIOLOGY:  RECENT CULTURES:  02-12 .Blood Blood-Peripheral XXXX   Growth in aerobic bottle: Gram Positive Cocci in Clusters  Growth in anaerobic bottle: Gram Positive Cocci in Clusters   Growth in aerobic and anaerobic bottles: Staphylococcus aureus  See previous culture [id number]    02-11 .Blood Blood-Peripheral Blood Culture PCR  Staphylococcus aureus   Growth in aerobic bottle: Gram Positive Cocci in Clusters  Growth in anaerobic bottle: Gram Positive Cocci in Clusters   Growth in aerobic and anaerobic bottles: Staphylococcus aureus  See previous culture [id number]                    Sodium:  Sodium, Serum: 135 mmol/L (02-14 @ 07:48)  Sodium, Serum: 134 mmol/L (02-13 @ 08:21)  Sodium, Serum: 133 mmol/L (02-12 @ 05:48)  Sodium, Serum: 129 mmol/L (02-11 @ 10:10)      1.40 mg/dL 02-14 @ 07:48  1.50 mg/dL 02-13 @ 08:21  1.60 mg/dL 02-12 @ 05:48  1.60 mg/dL 02-11 @ 10:10      Hemoglobin:  Hemoglobin: 13.3 g/dL (02-15 @ 07:53)  Hemoglobin: 13.7 g/dL (02-14 @ 07:48)  Hemoglobin: 12.7 g/dL (02-13 @ 08:21)  Hemoglobin: 13.6 g/dL (02-12 @ 05:48)  Hemoglobin: 14.5 g/dL (02-11 @ 10:10)      Platelets: Platelet Count - Automated: 162 K/uL (02-15 @ 07:53)  Platelet Count - Automated: 180 K/uL (02-14 @ 07:48)  Platelet Count - Automated: 175 K/uL (02-13 @ 08:21)  Platelet Count - Automated: 173 K/uL (02-12 @ 05:48)  Platelet Count - Automated: 204 K/uL (02-11 @ 10:10)              RADIOLOGY & ADDITIONAL STUDIES:      MICROBIOLOGY:  RECENT CULTURES:  02-12 .Blood Blood-Peripheral XXXX   Growth in aerobic bottle: Gram Positive Cocci in Clusters  Growth in anaerobic bottle: Gram Positive Cocci in Clusters   Growth in aerobic and anaerobic bottles: Staphylococcus aureus  See previous culture [id number]    02-11 .Blood Blood-Peripheral Blood Culture PCR  Staphylococcus aureus   Growth in aerobic bottle: Gram Positive Cocci in Clusters  Growth in anaerobic bottle: Gram Positive Cocci in Clusters   Growth in aerobic and anaerobic bottles: Staphylococcus aureus  See previous culture [id number]            
    DOROTHY VOSS    PLV TELE 304 W1    Allergies    No Known Allergies    Intolerances    pork (Other)      PAST MEDICAL & SURGICAL HISTORY:  Heart failure, systolic    CAD (coronary artery disease)    Hypertension    Nonischemic cardiomyopathy    COPD, moderate    2019 novel coronavirus disease (COVID-19)    Substance abuse    HLD (hyperlipidemia)    Cardiac LV ejection fraction 10-20%    AICD (automatic cardioverter/defibrillator) present    Cardiac LV ejection fraction 10-20%        FAMILY HISTORY:  FH: lung cancer        Home Medications:  acetaminophen 325 mg oral tablet: 2 tab(s) orally every 6 hours, As needed, Temp greater or equal to 38C (100.4F), Mild Pain (1 - 3) (13 Feb 2022 10:49)  Aquaphor Healing topical ointment: Apply topically to affected area once a day (13 Feb 2022 10:49)  Bacid (LAC) oral capsule: 2 cap(s) orally once a day (13 Feb 2022 10:49)  DuoNeb 0.5 mg-2.5 mg/3 mL inhalation solution: 3 milliliter(s) inhaled 4 times a day, As Needed (13 Feb 2022 10:49)  furosemide 20 mg oral tablet: 1 tab(s) orally once a day (13 Feb 2022 10:49)  gabapentin 100 mg oral capsule: 1 cap(s) orally 3 times a day (13 Feb 2022 10:49)  melatonin 3 mg oral tablet: 1 tab(s) orally once a day (at bedtime), As needed, Insomnia (13 Feb 2022 10:49)  metoprolol succinate 100 mg oral tablet, extended release: 1 tab(s) orally once a day (13 Feb 2022 10:49)  simethicone 80 mg oral tablet: 2 tab(s) orally every 6 hours, As Needed (13 Feb 2022 10:49)  Symbicort 160 mcg-4.5 mcg/inh inhalation aerosol: 2 puff(s) inhaled 2 times a day (13 Feb 2022 10:49)  Ventolin HFA 90 mcg/inh inhalation aerosol: 2 puff(s) inhaled 3 times a day for 5 days. Start: 02/10/22 (13 Feb 2022 10:49)  Ventolin HFA 90 mcg/inh inhalation aerosol: 2 puff(s) inhaled every 6 hours, As Needed (13 Feb 2022 10:49)      MEDICATIONS  (STANDING):  aMIOdarone    Tablet 200 milliGRAM(s) Oral daily  apixaban 5 milliGRAM(s) Oral every 12 hours  aspirin enteric coated 81 milliGRAM(s) Oral daily  atorvastatin 40 milliGRAM(s) Oral at bedtime  buMETAnide 2 milliGRAM(s) Oral two times a day  ceFAZolin   IVPB 2000 milliGRAM(s) IV Intermittent every 8 hours  chlorhexidine 4% Liquid 1 Application(s) Topical <User Schedule>  gabapentin 100 milliGRAM(s) Oral three times a day  lactobacillus acidophilus 1 Tablet(s) Oral two times a day  lidocaine   4% Patch 1 Patch Transdermal daily  metoprolol succinate ER 25 milliGRAM(s) Oral daily  pantoprazole    Tablet 40 milliGRAM(s) Oral before breakfast  sacubitril 24 mG/valsartan 26 mG 1 Tablet(s) Oral two times a day  spironolactone 25 milliGRAM(s) Oral daily    MEDICATIONS  (PRN):  acetaminophen     Tablet .. 650 milliGRAM(s) Oral every 6 hours PRN Temp greater or equal to 38C (100.4F), Mild Pain (1 - 3)  ALBUTerol    90 MICROgram(s) HFA Inhaler 2 Puff(s) Inhalation every 6 hours PRN Shortness of Breath and/or Wheezing  aluminum hydroxide/magnesium hydroxide/simethicone Suspension 30 milliLiter(s) Oral every 4 hours PRN Dyspepsia  guaiFENesin Oral Liquid (Sugar-Free) 200 milliGRAM(s) Oral every 6 hours PRN Cough  melatonin 3 milliGRAM(s) Oral at bedtime PRN Insomnia  ondansetron Injectable 4 milliGRAM(s) IV Push every 8 hours PRN Nausea and/or Vomiting  simethicone 80 milliGRAM(s) Chew three times a day PRN Indigestion  sodium chloride 0.9% lock flush 10 milliLiter(s) IV Push every 1 hour PRN Pre/post blood products, medications, blood draw, and to maintain line patency      Diet, Consistent Carbohydrate w/Evening Snack:   Low Sodium  Supplement Feeding Modality:  Oral  Glucerna Shake Cans or Servings Per Day:  1       Frequency:  Three Times a day (02-18-22 @ 15:52) [Active]          Vital Signs Last 24 Hrs  T(C): 36.7 (19 Feb 2022 04:07), Max: 36.7 (19 Feb 2022 04:07)  T(F): 98.1 (19 Feb 2022 04:07), Max: 98.1 (19 Feb 2022 04:07)  HR: 68 (19 Feb 2022 04:07) (64 - 70)  BP: 98/65 (19 Feb 2022 04:07) (98/65 - 115/74)  BP(mean): --  RR: 16 (19 Feb 2022 04:07) (16 - 18)  SpO2: 93% (19 Feb 2022 04:07) (93% - 97%)      02-18-22 @ 07:01  -  02-19-22 @ 07:00  --------------------------------------------------------  IN: 0 mL / OUT: 1900 mL / NET: -1900 mL              LABS:                        16.8   6.17  )-----------( 189      ( 19 Feb 2022 08:07 )             52.2                 ABG - ( 17 Feb 2022 10:03 )  pH, Arterial: 7.42  pH, Blood: x     /  pCO2: 53    /  pO2: 85    / HCO3: 34    / Base Excess: 9.9   /  SaO2: 97.8                WBC:  WBC Count: 6.17 K/uL (02-19 @ 08:07)  WBC Count: 9.70 K/uL (02-17 @ 07:27)  WBC Count: 9.90 K/uL (02-16 @ 07:27)      MICROBIOLOGY:  RECENT CULTURES:  02-15 Clean Catch Clean Catch (Midstream) XXXX XXXX   No growth    02-14 .Blood Blood-Peripheral XXXX XXXX   No growth to date.    02-12 .Blood Blood-Peripheral XXXX   Growth in aerobic bottle: Gram Positive Cocci in Clusters  Growth in anaerobic bottle: Gram Positive Cocci in Clusters   Growth in aerobic and anaerobic bottles: Staphylococcus aureus  See previous culture [id number]                    Sodium:  Sodium, Serum: 134 mmol/L (02-17 @ 07:27)  Sodium, Serum: 138 mmol/L (02-16 @ 07:27)      1.40 mg/dL 02-17 @ 07:27  1.10 mg/dL 02-16 @ 07:27      Hemoglobin:  Hemoglobin: 16.8 g/dL (02-19 @ 08:07)  Hemoglobin: 14.9 g/dL (02-17 @ 07:27)  Hemoglobin: 13.8 g/dL (02-16 @ 07:27)      Platelets: Platelet Count - Automated: 189 K/uL (02-19 @ 08:07)  Platelet Count - Automated: 206 K/uL (02-17 @ 07:27)  Platelet Count - Automated: 193 K/uL (02-16 @ 07:27)              RADIOLOGY & ADDITIONAL STUDIES:      MICROBIOLOGY:  RECENT CULTURES:  02-15 Clean Catch Clean Catch (Midstream) XXXX XXXX   No growth    02-14 .Blood Blood-Peripheral XXXX XXXX   No growth to date.    02-12 .Blood Blood-Peripheral XXXX   Growth in aerobic bottle: Gram Positive Cocci in Clusters  Growth in anaerobic bottle: Gram Positive Cocci in Clusters   Growth in aerobic and anaerobic bottles: Staphylococcus aureus  See previous culture [id number]            
    DOROTHY VOSS    [facility] TELE 304 W1    Allergies    No Known Allergies    Intolerances    pork (Other)      PAST MEDICAL & SURGICAL HISTORY:  Heart failure, systolic    CAD (coronary artery disease)    Hypertension    Nonischemic cardiomyopathy    COPD, moderate    2019 novel coronavirus disease (COVID-19)    Substance abuse    HLD (hyperlipidemia)    Cardiac LV ejection fraction 10-20%    AICD (automatic cardioverter/defibrillator) present    Cardiac LV ejection fraction 10-20%        FAMILY HISTORY:  FH: lung cancer        Home Medications:  acetaminophen 325 mg oral tablet: 2 tab(s) orally every 6 hours, As needed, Temp greater or equal to 38C (100.4F), Mild Pain (1 - 3) (13 Feb 2022 10:49)  Aquaphor Healing topical ointment: Apply topically to affected area once a day (13 Feb 2022 10:49)  ascorbic acid 500 mg oral tablet: 1 tab(s) orally once a day (19 Feb 2022 13:16)  Bacid (LAC) oral capsule: 2 cap(s) orally once a day (13 Feb 2022 10:49)  bumetanide 2 mg oral tablet: 1 tab(s) orally 2 times a day (19 Feb 2022 13:16)  ceFAZolin: 2 gram(s) intravenous every 8 hours ,last day 03/26/22 (19 Feb 2022 13:16)  gabapentin 100 mg oral capsule: 1 cap(s) orally 3 times a day (13 Feb 2022 10:49)  melatonin 3 mg oral tablet: 1 tab(s) orally once a day (at bedtime), As needed, Insomnia (13 Feb 2022 10:49)  metoprolol succinate 25 mg oral tablet, extended release: 1 tab(s) orally once a day (19 Feb 2022 13:16)  Multiple Vitamins with Minerals oral tablet: 1 tab(s) orally once a day (19 Feb 2022 13:16)  pantoprazole 40 mg oral delayed release tablet: 1 tab(s) orally once a day (before a meal) (19 Feb 2022 13:16)  simethicone 80 mg oral tablet: 2 tab(s) orally every 6 hours, As Needed (13 Feb 2022 10:49)  spironolactone 25 mg oral tablet: 1 tab(s) orally once a day (19 Feb 2022 13:16)  Symbicort 160 mcg-4.5 mcg/inh inhalation aerosol: 2 puff(s) inhaled 2 times a day (13 Feb 2022 10:49)  Ventolin HFA 90 mcg/inh inhalation aerosol: 2 puff(s) inhaled every 6 hours, As Needed (13 Feb 2022 10:49)      MEDICATIONS  (STANDING):  aMIOdarone    Tablet 200 milliGRAM(s) Oral daily  apixaban 5 milliGRAM(s) Oral every 12 hours  aspirin enteric coated 81 milliGRAM(s) Oral daily  atorvastatin 40 milliGRAM(s) Oral at bedtime  buMETAnide 2 milliGRAM(s) Oral two times a day  ceFAZolin   IVPB 2000 milliGRAM(s) IV Intermittent every 8 hours  chlorhexidine 4% Liquid 1 Application(s) Topical <User Schedule>  gabapentin 100 milliGRAM(s) Oral three times a day  lactobacillus acidophilus 1 Tablet(s) Oral two times a day  lidocaine   4% Patch 1 Patch Transdermal daily  metoprolol succinate ER 25 milliGRAM(s) Oral daily  midodrine. 5 milliGRAM(s) Oral once  pantoprazole    Tablet 40 milliGRAM(s) Oral before breakfast  spironolactone 25 milliGRAM(s) Oral daily    MEDICATIONS  (PRN):  acetaminophen     Tablet .. 650 milliGRAM(s) Oral every 6 hours PRN Temp greater or equal to 38C (100.4F), Mild Pain (1 - 3)  ALBUTerol    90 MICROgram(s) HFA Inhaler 2 Puff(s) Inhalation every 6 hours PRN Shortness of Breath and/or Wheezing  aluminum hydroxide/magnesium hydroxide/simethicone Suspension 30 milliLiter(s) Oral every 4 hours PRN Dyspepsia  guaiFENesin Oral Liquid (Sugar-Free) 200 milliGRAM(s) Oral every 6 hours PRN Cough  melatonin 3 milliGRAM(s) Oral at bedtime PRN Insomnia  ondansetron Injectable 4 milliGRAM(s) IV Push every 8 hours PRN Nausea and/or Vomiting  simethicone 80 milliGRAM(s) Chew three times a day PRN Indigestion  sodium chloride 0.9% lock flush 10 milliLiter(s) IV Push every 1 hour PRN Pre/post blood products, medications, blood draw, and to maintain line patency      Diet, Consistent Carbohydrate w/Evening Snack:   Low Sodium  Supplement Feeding Modality:  Oral  Glucerna Shake Cans or Servings Per Day:  1       Frequency:  Three Times a day (02-18-22 @ 15:52) [Active]          Vital Signs Last 24 Hrs  T(C): 36.4 (21 Feb 2022 04:30), Max: 37.7 (20 Feb 2022 20:33)  T(F): 97.6 (21 Feb 2022 04:30), Max: 99.8 (20 Feb 2022 20:33)  HR: 92 (21 Feb 2022 04:30) (92 - 98)  BP: 128/87 (21 Feb 2022 04:30) (79/54 - 128/87)  BP(mean): --  RR: 18 (21 Feb 2022 04:30) (17 - 18)  SpO2: 92% (21 Feb 2022 04:30) (92% - 96%)      02-20-22 @ 07:01  -  02-21-22 @ 07:00  --------------------------------------------------------  IN: 0 mL / OUT: 1200 mL / NET: -1200 mL    02-21-22 @ 07:01  -  02-21-22 @ 09:40  --------------------------------------------------------  IN: 0 mL / OUT: 500 mL / NET: -500 mL              LABS:                        15.2   5.24  )-----------( 132      ( 21 Feb 2022 08:04 )             48.4                     WBC:  WBC Count: 5.24 K/uL (02-21 @ 08:04)  WBC Count: 6.17 K/uL (02-19 @ 08:07)      MICROBIOLOGY:  RECENT CULTURES:  02-15 Clean Catch Clean Catch (Midstream) XXXX XXXX   No growth    02-14 .Blood Blood-Peripheral XXXX XXXX   No Growth Final                    Sodium:  Sodium, Serum: 135 mmol/L (02-19 @ 09:28)      1.80 mg/dL 02-19 @ 09:28      Hemoglobin:  Hemoglobin: 15.2 g/dL (02-21 @ 08:04)  Hemoglobin: 16.8 g/dL (02-19 @ 08:07)      Platelets: Platelet Count - Automated: 132 K/uL (02-21 @ 08:04)  Platelet Count - Automated: 189 K/uL (02-19 @ 08:07)              RADIOLOGY & ADDITIONAL STUDIES:      MICROBIOLOGY:  RECENT CULTURES:  02-15 Clean Catch Clean Catch (Midstream) XXXX XXXX   No growth    02-14 .Blood Blood-Peripheral XXXX XXXX   No Growth Final            
  Patient is a 61y Male whom presented to the hospital with ckd and ki     PAST MEDICAL & SURGICAL HISTORY:  Heart failure, systolic    CAD (coronary artery disease)    Hypertension    Nonischemic cardiomyopathy    COPD, moderate    2019 novel coronavirus disease (COVID-19)    Substance abuse    HLD (hyperlipidemia)    Cardiac LV ejection fraction 10-20%    AICD (automatic cardioverter/defibrillator) present    Cardiac LV ejection fraction 10-20%        MEDICATIONS  (STANDING):  apixaban 5 milliGRAM(s) Oral every 12 hours  atorvastatin 40 milliGRAM(s) Oral at bedtime  buDESOnide    Inhalation Suspension 0.5 milliGRAM(s) Inhalation two times a day  buMETAnide Injectable 2 milliGRAM(s) IV Push every 12 hours  dexAMETHasone     Tablet 6 milliGRAM(s) Oral daily  gabapentin 100 milliGRAM(s) Oral three times a day  lactobacillus acidophilus 1 Tablet(s) Oral two times a day  pantoprazole    Tablet 40 milliGRAM(s) Oral before breakfast      Allergies    No Known Allergies    Intolerances    pork (Other)      SOCIAL HISTORY:  Denies ETOh,Smoking,     FAMILY HISTORY:  FH: lung cancer        REVIEW OF SYSTEMS:  unable to obtained a good review system                                                                                       13.8   9.90  )-----------( 193      ( 2022 07:27 )             43.7       CBC Full  -  ( 2022 07:27 )  WBC Count : 9.90 K/uL  RBC Count : 5.11 M/uL  Hemoglobin : 13.8 g/dL  Hematocrit : 43.7 %  Platelet Count - Automated : 193 K/uL  Mean Cell Volume : 85.5 fl  Mean Cell Hemoglobin : 27.0 pg  Mean Cell Hemoglobin Concentration : 31.6 gm/dL  Auto Neutrophil # : x  Auto Lymphocyte # : x  Auto Monocyte # : x  Auto Eosinophil # : x  Auto Basophil # : x  Auto Neutrophil % : x  Auto Lymphocyte % : x  Auto Monocyte % : x  Auto Eosinophil % : x  Auto Basophil % : x      02-16    138  |  101  |  54<H>  ----------------------------<  135<H>  4.9   |  31  |  1.10    Ca    7.8<L>      2022 07:27        CAPILLARY BLOOD GLUCOSE          Vital Signs Last 24 Hrs  T(C): 36.7 (2022 19:31), Max: 36.8 (2022 11:40)  T(F): 98 (2022 19:31), Max: 98.3 (2022 11:40)  HR: 66 (2022 19:31) (61 - 78)  BP: 117/81 (2022 19:31) (103/68 - 117/81)  BP(mean): --  RR: 17 (2022 19:31) (17 - 18)  SpO2: 98% (2022 19:31) (92% - 98%)    Urinalysis Basic - ( 15 Feb 2022 09:54 )    Color: Yellow / Appearance: Clear / S.015 / pH: x  Gluc: x / Ketone: Negative  / Bili: Negative / Urobili: Negative   Blood: x / Protein: 15 / Nitrite: Negative   Leuk Esterase: Negative / RBC: x / WBC 0-2   Sq Epi: x / Non Sq Epi: Occasional / Bacteria: x            PHYSICAL EXAM:    Constitutional: NAD  HEENT: conjunctive   clear   Neck:  No JVD  Respiratory: decrease bs b/l   Cardiovascular: S1 and S2  Gastrointestinal: BS+, soft,   Extremities: No peripheral edema    
  Patient is a 61y Male whom presented to the hospital with ckd and ki     PAST MEDICAL & SURGICAL HISTORY:  Heart failure, systolic    CAD (coronary artery disease)    Hypertension    Nonischemic cardiomyopathy    COPD, moderate    2019 novel coronavirus disease (COVID-19)    Substance abuse    HLD (hyperlipidemia)    Cardiac LV ejection fraction 10-20%    AICD (automatic cardioverter/defibrillator) present    Cardiac LV ejection fraction 10-20%        MEDICATIONS  (STANDING):  apixaban 5 milliGRAM(s) Oral every 12 hours  atorvastatin 40 milliGRAM(s) Oral at bedtime  buDESOnide    Inhalation Suspension 0.5 milliGRAM(s) Inhalation two times a day  buMETAnide Injectable 2 milliGRAM(s) IV Push every 12 hours  dexAMETHasone     Tablet 6 milliGRAM(s) Oral daily  gabapentin 100 milliGRAM(s) Oral three times a day  lactobacillus acidophilus 1 Tablet(s) Oral two times a day  pantoprazole    Tablet 40 milliGRAM(s) Oral before breakfast      Allergies    No Known Allergies    Intolerances    pork (Other)      SOCIAL HISTORY:  Denies ETOh,Smoking,     FAMILY HISTORY:  FH: lung cancer        REVIEW OF SYSTEMS:  unable to obtained a good review system                                                       14.9   9.70  )-----------( 206      ( 17 Feb 2022 07:27 )             47.2       CBC Full  -  ( 17 Feb 2022 07:27 )  WBC Count : 9.70 K/uL  RBC Count : 5.47 M/uL  Hemoglobin : 14.9 g/dL  Hematocrit : 47.2 %  Platelet Count - Automated : 206 K/uL  Mean Cell Volume : 86.3 fl  Mean Cell Hemoglobin : 27.2 pg  Mean Cell Hemoglobin Concentration : 31.6 gm/dL  Auto Neutrophil # : x  Auto Lymphocyte # : x  Auto Monocyte # : x  Auto Eosinophil # : x  Auto Basophil # : x  Auto Neutrophil % : x  Auto Lymphocyte % : x  Auto Monocyte % : x  Auto Eosinophil % : x  Auto Basophil % : x      02-17    134<L>  |  97  |  68<H>  ----------------------------<  213<H>  4.7   |  29  |  1.40<H>    Ca    7.9<L>      17 Feb 2022 07:27        CAPILLARY BLOOD GLUCOSE          Vital Signs Last 24 Hrs  T(C): 36.6 (17 Feb 2022 19:03), Max: 36.8 (17 Feb 2022 10:55)  T(F): 97.8 (17 Feb 2022 19:03), Max: 98.3 (17 Feb 2022 10:55)  HR: 61 (17 Feb 2022 19:03) (61 - 67)  BP: 108/67 (17 Feb 2022 19:03) (99/64 - 117/81)  BP(mean): --  RR: 17 (17 Feb 2022 19:03) (17 - 18)  SpO2: 97% (17 Feb 2022 19:03) (95% - 98%)              PHYSICAL EXAM:    Constitutional: NAD  HEENT: conjunctive   clear   Neck:  No JVD  Respiratory: decrease bs b/l   Cardiovascular: S1 and S2  Gastrointestinal: BS+, soft,   Extremities: No peripheral edema    
  Patient is a 61y Male whom presented to the hospital with ckd and ki     PAST MEDICAL & SURGICAL HISTORY:  Heart failure, systolic    CAD (coronary artery disease)    Hypertension    Nonischemic cardiomyopathy    COPD, moderate    2019 novel coronavirus disease (COVID-19)    Substance abuse    HLD (hyperlipidemia)    Cardiac LV ejection fraction 10-20%    AICD (automatic cardioverter/defibrillator) present    Cardiac LV ejection fraction 10-20%        MEDICATIONS  (STANDING):  apixaban 5 milliGRAM(s) Oral every 12 hours  atorvastatin 40 milliGRAM(s) Oral at bedtime  buDESOnide    Inhalation Suspension 0.5 milliGRAM(s) Inhalation two times a day  buMETAnide Injectable 2 milliGRAM(s) IV Push every 12 hours  dexAMETHasone     Tablet 6 milliGRAM(s) Oral daily  gabapentin 100 milliGRAM(s) Oral three times a day  lactobacillus acidophilus 1 Tablet(s) Oral two times a day  pantoprazole    Tablet 40 milliGRAM(s) Oral before breakfast      Allergies    No Known Allergies    Intolerances    pork (Other)      SOCIAL HISTORY:  Denies ETOh,Smoking,     FAMILY HISTORY:  FH: lung cancer        REVIEW OF SYSTEMS:  unable to obtained a good review system                          12.7   11.78 )-----------( 175      ( 13 Feb 2022 08:21 )             39.4       CBC Full  -  ( 13 Feb 2022 08:21 )  WBC Count : 11.78 K/uL  RBC Count : 4.67 M/uL  Hemoglobin : 12.7 g/dL  Hematocrit : 39.4 %  Platelet Count - Automated : 175 K/uL  Mean Cell Volume : 84.4 fl  Mean Cell Hemoglobin : 27.2 pg  Mean Cell Hemoglobin Concentration : 32.2 gm/dL  Auto Neutrophil # : x  Auto Lymphocyte # : x  Auto Monocyte # : x  Auto Eosinophil # : x  Auto Basophil # : x  Auto Neutrophil % : x  Auto Lymphocyte % : x  Auto Monocyte % : x  Auto Eosinophil % : x  Auto Basophil % : x      02-13    134<L>  |  97  |  65<H>  ----------------------------<  162<H>  4.5   |  30  |  1.50<H>    Ca    8.7      13 Feb 2022 08:21  Phos  4.1     02-12  Mg     2.6     02-12    TPro  5.6<L>  /  Alb  2.3<L>  /  TBili  2.9<H>  /  DBili  x   /  AST  34  /  ALT  42  /  AlkPhos  293<H>  02-12      CAPILLARY BLOOD GLUCOSE          Vital Signs Last 24 Hrs  T(C): 36.6 (13 Feb 2022 11:05), Max: 36.7 (12 Feb 2022 20:16)  T(F): 97.8 (13 Feb 2022 11:05), Max: 98 (12 Feb 2022 20:16)  HR: 71 (13 Feb 2022 11:05) (68 - 78)  BP: 125/88 (13 Feb 2022 11:05) (120/90 - 125/88)  BP(mean): --  RR: 21 (13 Feb 2022 11:05) (17 - 21)  SpO2: 100% (13 Feb 2022 11:05) (95% - 100%)        PT/INR - ( 12 Feb 2022 05:48 )   PT: 30.0 sec;   INR: 2.69 ratio             PHYSICAL EXAM:    Constitutional: NAD  HEENT: conjunctive   clear   Neck:  No JVD  Respiratory: decrease bs b/l   Cardiovascular: S1 and S2  Gastrointestinal: BS+, soft,   Extremities: No peripheral edema    
Chief Complaint: Shortness of breath    Interval Events: No events overnight.    Review of Systems:  General: No fevers, chills, weight gain  Skin: No rashes, color changes  Cardiovascular: No chest pain, orthopnea  Respiratory: No shortness of breath, cough  Gastrointestinal: No nausea, abdominal pain  Genitourinary: No incontinence, pain with urination  Musculoskeletal: No pain, swelling, decreased range of motion  Neurological: No headache, weakness  Psychiatric: No depression, anxiety  Endocrine: No weight gain, increased thirst  All other systems are comprehensively negative.    Physical Exam:  Vital Signs Last 24 Hrs  T(C): 36.4 (14 Feb 2022 06:06), Max: 37.1 (13 Feb 2022 19:06)  T(F): 97.5 (14 Feb 2022 06:06), Max: 98.7 (13 Feb 2022 19:06)  HR: 66 (14 Feb 2022 09:03) (63 - 71)  BP: 111/77 (14 Feb 2022 06:06) (111/77 - 125/88)  BP(mean): --  RR: 18 (14 Feb 2022 06:06) (18 - 21)  SpO2: 96% (14 Feb 2022 09:03) (96% - 100%)  General: NAD  HEENT: MMM  Neck: No JVD, no carotid bruit  Lungs: CTAB  CV: RRR, nl S1/S2, no M/R/G  Abdomen: S/NT/ND, +BS  Extremities: 2+ LE edema, no cyanosis  Neuro: AAOx3, non-focal  Skin: No rash    Labs:    02-14    135  |  98  |  71<H>  ----------------------------<  130<H>  4.8   |  32<H>  |  1.40<H>    Ca    9.0      14 Feb 2022 07:48                          13.7   9.75  )-----------( 180      ( 14 Feb 2022 07:48 )             44.3       Telemetry: Sinus rhythm
[facility], Division of Infectious Diseases  MARINA Mccray Y. Patel, S. Shah, G. [facility]  [phone]    Name: DOROTHY VOSS  Age: 61y  Gender: Male  MRN: [id number]    Interval History:  Patient seen and examined at bedside this morning  No acute overnight events. Afebrile  Notes reviewed    Antibiotics:  ceFAZolin   IVPB 2000 milliGRAM(s) IV Intermittent every 8 hours      Medications:  acetaminophen     Tablet .. 650 milliGRAM(s) Oral every 6 hours PRN  ALBUTerol    90 MICROgram(s) HFA Inhaler 2 Puff(s) Inhalation every 6 hours PRN  aluminum hydroxide/magnesium hydroxide/simethicone Suspension 30 milliLiter(s) Oral every 4 hours PRN  aMIOdarone    Tablet 200 milliGRAM(s) Oral daily  apixaban 5 milliGRAM(s) Oral every 12 hours  aspirin enteric coated 81 milliGRAM(s) Oral daily  atorvastatin 40 milliGRAM(s) Oral at bedtime  buMETAnide 2 milliGRAM(s) Oral two times a day  ceFAZolin   IVPB 2000 milliGRAM(s) IV Intermittent every 8 hours  chlorhexidine 4% Liquid 1 Application(s) Topical <User Schedule>  gabapentin 100 milliGRAM(s) Oral three times a day  guaiFENesin Oral Liquid (Sugar-Free) 200 milliGRAM(s) Oral every 6 hours PRN  lactobacillus acidophilus 1 Tablet(s) Oral two times a day  lidocaine   4% Patch 1 Patch Transdermal daily  melatonin 3 milliGRAM(s) Oral at bedtime PRN  metoprolol succinate ER 25 milliGRAM(s) Oral daily  ondansetron Injectable 4 milliGRAM(s) IV Push every 8 hours PRN  pantoprazole    Tablet 40 milliGRAM(s) Oral before breakfast  sacubitril 24 mG/valsartan 26 mG 1 Tablet(s) Oral two times a day  simethicone 80 milliGRAM(s) Chew three times a day PRN  sodium chloride 0.9% lock flush 10 milliLiter(s) IV Push every 1 hour PRN  spironolactone 25 milliGRAM(s) Oral daily      Review of Systems:    Review of systems otherwise negative except as previously noted.    Allergies: No Known Allergies    For details regarding the patient's past medical history, social history, family history, and other miscellaneous elements, please refer the initial infectious diseases consultation and/or the admitting history and physical examination for this admission.    Objective:  Vital Signs Last 24 Hrs  T(C): 36.9 (20 Feb 2022 04:35), Max: 37.1 (19 Feb 2022 21:06)  T(F): 98.5 (20 Feb 2022 04:35), Max: 98.7 (19 Feb 2022 21:06)  HR: 81 (20 Feb 2022 04:35) (80 - 83)  BP: 94/58 (20 Feb 2022 04:35) (82/60 - 94/58)  BP(mean): --  RR: 17 (20 Feb 2022 04:35) (17 - 18)  SpO2: 95% (20 Feb 2022 04:35) (91% - 95%)    Physical Examination:  General: no acute distress  HEENT: NC/AT, EOMI  Cardio: S1, S2 heard, RRR, no murmurs  Resp: decreased b/l breath sounds  Abd: soft, NT, ND, + bowel sounds  Ext: no edema or cyanosis  Skin: warm, dry, no visible rash      Laboratory Studies:                        16.8   6.17  )-----------( 189      ( 19 Feb 2022 08:07 )             52.2   02-19    135  |  96  |  95<H>  ----------------------------<  115<H>  5.7<H>   |  36<H>  |  1.80<H>    Ca    7.9<L>      19 Feb 2022 09:28    Microbiology: reviewed    Culture - Urine (collected 02-15-22 @ 16:24)  Source: Clean Catch Clean Catch (Midstream)  Final Report (02-16-22 @ 13:24):    No growth    Culture - Blood (collected 02-14-22 @ 12:17)  Source: .Blood Blood-Peripheral  Preliminary Report (02-15-22 @ 13:02):    No growth to date.    Culture - Blood (collected 02-12-22 @ 12:06)  Source: .Blood Blood-Peripheral  Gram Stain (02-13-22 @ 08:12):    Growth in aerobic bottle: Gram Positive Cocci in Clusters    Growth in anaerobic bottle: Gram Positive Cocci in Clusters  Final Report (02-14-22 @ 07:16):    Growth in aerobic and anaerobic bottles: Staphylococcus aureus    See previous culture [id number]    Culture - Blood (collected 02-12-22 @ 12:06)  Source: .Blood Blood-Peripheral  Gram Stain (02-13-22 @ 08:15):    Growth in aerobic bottle: Gram Positive Cocci in Clusters    Growth in anaerobic bottle: Gram Positive Cocci in Clusters  Final Report (02-14-22 @ 07:15):    Growth in aerobic and anaerobic bottles: Staphylococcus aureus    See previous culture [id number]    Culture - Blood (collected 02-11-22 @ 16:19)  Source: .Blood Blood-Peripheral  Gram Stain (02-12-22 @ 07:00):    Growth in aerobic bottle: Gram Positive Cocci in Clusters    Growth in anaerobic bottle: Gram Positive Cocci in Clusters  Final Report (02-14-22 @ 07:13):    Growth in aerobic and anaerobic bottles: Staphylococcus aureus    ***Blood Panel PCR results on this specimen are available    approximately 3 hours after the Gram stain result.***    Gram stain, PCR, and/or culture results may not always    correspond dueto difference in methodologies.    ************************************************************    This PCR assay was performed by multiplex PCR. This    Assay tests for 66 bacterial and resistance gene targets.    Please refer to the [facility] test directory    at [email or web address] for details.  Organism: Blood Culture PCR  Staphylococcus aureus (02-14-22 @ 07:13)  Organism: Staphylococcus aureus (02-14-22 @ 07:13)      -  Ampicillin/Sulbactam: S <=8/4      -  Cefazolin: S <=4      -  Clindamycin: R <=0.25 This isolate is presumed to be clindamycin resistant based on detection of inducible resistance. Clindamycin may still be effective in some patients.      -  Erythromycin: R >4      -  Gentamicin: S <=1 Should not be used as monotherapy      -  Oxacillin: S 0.5      -  Penicillin: R 4      -  Rifampin: S <=1 Should not be used as monotherapy      -  Tetra/Doxy: S <=1      -  Trimethoprim/Sulfamethoxazole: S <=0.5/9.5      -  Vancomycin: S 1      Method Type: BAY  Organism: Blood Culture PCR (02-14-22 @ 07:13)      -  Staphylococcus aureus: Detec Any isolate of Staphylococcus aureus from a blood culture is NOT considered a contaminant.      Method Type: PCR    Culture - Blood (collected 02-11-22 @ 16:19)  Source: .Blood Blood-Peripheral  Gram Stain (02-12-22 @ 06:59):    Growth in aerobic bottle: Gram Positive Cocci in Clusters    Growth in anaerobic bottle: Gram Positive Cocci in Clusters  Final Report (02-14-22 @ 07:13):    Growth in aerobic and anaerobic bottles: Staphylococcus aureus    See previous culture [id number]        Radiology: reviewed      
     DOROTHY VOSS is a 61yMale , patient examined and chart reviewed.     INTERVAL HPI/ OVERNIGHT EVENTS:   Improved.  No events.    PAST MEDICAL & SURGICAL HISTORY:  Heart failure, systolic  CAD (coronary artery disease)  Hypertension  Nonischemic cardiomyopathy  COPD, moderate  2019 novel coronavirus disease (COVID-19)  Substance abuse  HLD (hyperlipidemia)  Cardiac LV ejection fraction 10-20%  AICD (automatic cardioverter/defibrillator) present  Cardiac LV ejection fraction 10-20%      For details regarding the patient's social history, family history, and other miscellaneous elements, please refer the initial infectious diseases consultation and/or the admitting history and physical examination for this admission.    ROS:  CONSTITUTIONAL:  Negative fever or chills + back pain  EYES:  Negative  blurry vision or double vision  CARDIOVASCULAR:  Negative for chest pain or palpitations  RESPIRATORY:  Negative for cough, wheezing, or SOB   GASTROINTESTINAL:  Negative for nausea, vomiting, diarrhea, constipation, or abdominal pain  GENITOURINARY:  Negative frequency, urgency or dysuria  NEUROLOGIC:  No headache, confusion, dizziness, lightheadedness  All other systems were reviewed and are negative     ALLERGIES:  pork (Other)      Current inpatient medications :    ANTIBIOTICS/RELEVANT:  ceFAZolin   IVPB 1000 milliGRAM(s) IV Intermittent every 8 hours    MEDICATIONS  (STANDING):  aMIOdarone    Tablet 200 milliGRAM(s) Oral daily  apixaban 5 milliGRAM(s) Oral every 12 hours  aspirin enteric coated 81 milliGRAM(s) Oral daily  atorvastatin 40 milliGRAM(s) Oral at bedtime  buMETAnide Injectable 2 milliGRAM(s) IV Push every 12 hours  dexAMETHasone     Tablet 6 milliGRAM(s) Oral daily  gabapentin 100 milliGRAM(s) Oral three times a day  lactobacillus acidophilus 1 Tablet(s) Oral two times a day  lidocaine   4% Patch 1 Patch Transdermal daily  metoprolol succinate ER 25 milliGRAM(s) Oral daily  pantoprazole    Tablet 40 milliGRAM(s) Oral before breakfast  sacubitril 24 mG/valsartan 26 mG 1 Tablet(s) Oral two times a day  spironolactone 25 milliGRAM(s) Oral daily    MEDICATIONS  (PRN):  acetaminophen     Tablet .. 650 milliGRAM(s) Oral every 6 hours PRN Temp greater or equal to 38C (100.4F), Mild Pain (1 - 3)  aluminum hydroxide/magnesium hydroxide/simethicone Suspension 30 milliLiter(s) Oral every 4 hours PRN Dyspepsia  melatonin 3 milliGRAM(s) Oral at bedtime PRN Insomnia  ondansetron Injectable 4 milliGRAM(s) IV Push every 8 hours PRN Nausea and/or Vomiting  simethicone 80 milliGRAM(s) Chew three times a day PRN Indigestion      Objective:  Vital Signs Last 24 Hrs  T(C): 36.5 (15 Feb 2022 13:58), Max: 36.8 (14 Feb 2022 17:40)  T(F): 97.7 (15 Feb 2022 13:58), Max: 98.3 (14 Feb 2022 17:40)  HR: 64 (15 Feb 2022 13:58) (61 - 70)  BP: 107/72 (15 Feb 2022 13:58) (107/72 - 120/77)  RR: 18 (15 Feb 2022 13:58) (17 - 19)  SpO2: 97% (15 Feb 2022 13:58) (93% - 98%)      Physical Exam:  General: no acute distress HFNC  Neck: supple, trachea midline  Lungs: Decreased, no wheeze/rhonchi  Cardiovascular: regular rate and rhythm, S1 S2  Abdomen: soft, nontender,  bowel sounds normal  Neurological: alert and oriented x3  Skin: no rash  Extremities: + edema      LABS:                                 13.3   9.76  )-----------( 162      ( 15 Feb 2022 07:53 )             42.3   02-15    139  |  100  |  58<H>  ----------------------------<  161<H>  4.5   |  34<H>  |  1.20    Ca    8.6      15 Feb 2022 07:53    MICROBIOLOGY:    Culture - Blood (collected 14 Feb 2022 12:17)  Source: .Blood Blood-Peripheral  Preliminary Report (15 Feb 2022 13:02):    No growth to date.      Culture - Blood (collected 12 Feb 2022 12:06)  Source: .Blood Blood-Peripheral  Gram Stain (13 Feb 2022 08:12):    Growth in aerobic bottle: Gram Positive Cocci in Clusters    Growth in anaerobic bottle: Gram Positive Cocci in Clusters  Final Report (14 Feb 2022 07:16):    Growth in aerobic and anaerobic bottles: Staphylococcus aureus    See previous culture [id number]    Culture - Blood (collected 12 Feb 2022 12:06)  Source: .Blood Blood-Peripheral  Gram Stain (13 Feb 2022 08:15):    Growth in aerobic bottle: Gram Positive Cocci in Clusters    Growth in anaerobic bottle: Gram Positive Cocci in Clusters  Final Report (14 Feb 2022 07:15):    Growth in aerobic and anaerobic bottles: Staphylococcus aureus    See previous culture [id number]    Culture - Blood (collected 11 Feb 2022 16:19)  Source: .Blood Blood-Peripheral  Gram Stain (12 Feb 2022 07:00):    Growth in aerobic bottle: Gram Positive Cocci in Clusters    Growth in anaerobic bottle: Gram Positive Cocci in Clusters  Final Report (14 Feb 2022 07:13):    Growth in aerobic and anaerobic bottles: Staphylococcus aureus    ***Blood Panel PCR results on this specimen are available    approximately 3 hours after the Gram stain result.***    Gram stain, PCR, and/or culture results may not always    correspond dueto difference in methodologies.    ************************************************************    This PCR assay was performed by multiplex PCR. This    Assay tests for 66 bacterial and resistance gene targets.    Please refer to the [facility] Labs test directory    at [email or web address] for details.  Organism: Blood Culture PCR  Staphylococcus aureus (14 Feb 2022 07:13)  Organism: Staphylococcus aureus (14 Feb 2022 07:13)      -  Ampicillin/Sulbactam: S <=8/4      -  Cefazolin: S <=4      -  Clindamycin: R <=0.25 This isolate is presumed to be clindamycin resistant based on detection of inducible resistance. Clindamycin may still be effective in some patients.      -  Erythromycin: R >4      -  Gentamicin: S <=1 Should not be used as monotherapy      -  Oxacillin: S 0.5      -  Penicillin: R 4      -  Rifampin: S <=1 Should not be used as monotherapy      -  Tetra/Doxy: S <=1      -  Trimethoprim/Sulfamethoxazole: S <=0.5/9.5      -  Vancomycin: S 1      Method Type: BAY  Organism: Blood Culture PCR (14 Feb 2022 07:13)      -  Staphylococcus aureus: Detec Any isolate of Staphylococcus aureus from a blood culture is NOT considered a contaminant.      Method Type: PCR    Culture - Blood (collected 11 Feb 2022 16:19)  Source: .Blood Blood-Peripheral  Gram Stain (12 Feb 2022 06:59):    Growth in aerobic bottle: Gram Positive Cocci in Clusters    Growth in anaerobic bottle: Gram Positive Cocci in Clusters  Final Report (14 Feb 2022 07:13):    Growth in aerobic and anaerobic bottles: Staphylococcus aureus    See previous culture [id number]    RADIOLOGY & ADDITIONAL STUDIES:  ACC: [id number] EXAM:  XR CHEST PORTABLE IMMED 1V                          PROCEDURE DATE:  02/11/2022          INTERPRETATION:  Portable chest radiograph    CLINICAL INFORMATION: Dyspnea, shortness of breath.    TECHNIQUE:  Portable  AP chest radiograph.    COMPARISON: 1/14/2022 chest .    FINDINGS:  CATHETERS AND TUBES: None    PULMONARY: Unchanged moderate RIGHT pleural effusion and/or basilar   airspace disease obscuring diaphragm contour. RIGHT upper zone and LEFT   lung parenchyma clear..  No pneumothorax.    HEART/VASCULAR: The  heart is grossly enlarged in transverse diameter. No   hilar mass.  Cardiac device wire lead is within right ventricle. .    BONES: Visualized osseous structures are intact.    IMPRESSION:   No interval change..      Assessment :   61 year old male with a history of CAD, end stage chronic systolic heart failure s/p ICD, atrial flutter s/p DCCV, substance abuse, CKD COVID 19 1/23/22 admitted with Acute hypoxic respiratory failure likely sec CHF exacerbation Markedly elevated BNP. On HFNC with Staph Aureus bacteremia  Source of bacteremia unclear   Prob skin source- pt c/o mild tenderness at old IV sites on left arm though no appreciable induration erythema or palpable cord  Has AICD  Doubt resp failure sec COVID 19- pt had been positive since 1/23/22.  Pt is vaccinated against COVID 19.  C/o back pain  DERRICK better  Echo neg for endocarditis    Plan :   Will get WBC scan to rule out discitis  Unable to do MRI due to AICD  Cont Ancef  2grams q8  Fu repeat blood cultures  Cont Decadron 6mg po daily short course  No need for Remdesivir as doubt active COVID 19 infection  Diurese per cardiology  Trend temps and cbc  Wean down 02 requirements as tolerated    Continue with present regiment.  Appropriate use of antibiotics and adverse effects reviewed.      > 35 minutes were spent in direct patient care reviewing notes, medications ,labs data/ imaging , discussion with multidisciplinary team.    Thank you for allowing me to participate in care of your patient .    Kelley Phan MD  Infectious Disease  [phone]
     DOROTHY VOSS is a 61yMale , patient examined and chart reviewed.     INTERVAL HPI/ OVERNIGHT EVENTS:   Improved. Off HFNC on 4L NC.  Afebrile.    PAST MEDICAL & SURGICAL HISTORY:  Heart failure, systolic  CAD (coronary artery disease)  Hypertension  Nonischemic cardiomyopathy  COPD, moderate  2019 novel coronavirus disease (COVID-19)  Substance abuse  HLD (hyperlipidemia)  Cardiac LV ejection fraction 10-20%  AICD (automatic cardioverter/defibrillator) present  Cardiac LV ejection fraction 10-20%      For details regarding the patient's social history, family history, and other miscellaneous elements, please refer the initial infectious diseases consultation and/or the admitting history and physical examination for this admission.    ROS:  CONSTITUTIONAL:  Negative fever or chills + back pain  EYES:  Negative  blurry vision or double vision  CARDIOVASCULAR:  Negative for chest pain or palpitations  RESPIRATORY:  Negative for cough, wheezing, or SOB   GASTROINTESTINAL:  Negative for nausea, vomiting, diarrhea, constipation, or abdominal pain  GENITOURINARY:  Negative frequency, urgency or dysuria  NEUROLOGIC:  No headache, confusion, dizziness, lightheadedness  All other systems were reviewed and are negative     ALLERGIES:  pork (Other)      Current inpatient medications :    ANTIBIOTICS/RELEVANT:  ceFAZolin   IVPB 1000 milliGRAM(s) IV Intermittent every 8 hours    MEDICATIONS  (STANDING):  aMIOdarone    Tablet 200 milliGRAM(s) Oral daily  apixaban 5 milliGRAM(s) Oral every 12 hours  aspirin enteric coated 81 milliGRAM(s) Oral daily  atorvastatin 40 milliGRAM(s) Oral at bedtime  buMETAnide Injectable 2 milliGRAM(s) IV Push every 12 hours  dexAMETHasone     Tablet 6 milliGRAM(s) Oral daily  gabapentin 100 milliGRAM(s) Oral three times a day  lactobacillus acidophilus 1 Tablet(s) Oral two times a day  lidocaine   4% Patch 1 Patch Transdermal daily  metoprolol succinate ER 25 milliGRAM(s) Oral daily  pantoprazole    Tablet 40 milliGRAM(s) Oral before breakfast  sacubitril 24 mG/valsartan 26 mG 1 Tablet(s) Oral two times a day  spironolactone 25 milliGRAM(s) Oral daily    MEDICATIONS  (PRN):  acetaminophen     Tablet .. 650 milliGRAM(s) Oral every 6 hours PRN Temp greater or equal to 38C (100.4F), Mild Pain (1 - 3)  ALBUTerol    90 MICROgram(s) HFA Inhaler 2 Puff(s) Inhalation every 6 hours PRN Shortness of Breath and/or Wheezing  aluminum hydroxide/magnesium hydroxide/simethicone Suspension 30 milliLiter(s) Oral every 4 hours PRN Dyspepsia  guaiFENesin Oral Liquid (Sugar-Free) 200 milliGRAM(s) Oral every 6 hours PRN Cough  melatonin 3 milliGRAM(s) Oral at bedtime PRN Insomnia  ondansetron Injectable 4 milliGRAM(s) IV Push every 8 hours PRN Nausea and/or Vomiting  simethicone 80 milliGRAM(s) Chew three times a day PRN Indigestion        Objective:  Vital Signs Last 24 Hrs  T(C): 36.8 (16 Feb 2022 11:40), Max: 36.8 (15 Feb 2022 19:54)  T(F): 98.3 (16 Feb 2022 11:40), Max: 98.3 (16 Feb 2022 11:40)  HR: 67 (16 Feb 2022 11:40) (61 - 78)  BP: 105/73 (16 Feb 2022 11:40) (103/68 - 120/86)  RR: 18 (16 Feb 2022 11:40) (17 - 18)  SpO2: 98% (16 Feb 2022 11:40) (92% - 98%)      Physical Exam:  General: no acute distress  Neck: supple, trachea midline  Lungs: Decreased, no wheeze/rhonchi  Cardiovascular: regular rate and rhythm, S1 S2  Abdomen: soft, nontender,  bowel sounds normal  Neurological: alert and oriented x3  Skin: no rash  Extremities: + edema      LABS:                        13.8   9.90  )-----------( 193      ( 16 Feb 2022 07:27 )             43.7   02-16    138  |  101  |  54<H>  ----------------------------<  135<H>  4.9   |  31  |  1.10    Ca    7.8<L>      16 Feb 2022 07:27      MICROBIOLOGY:    Culture - Urine (collected 15 Feb 2022 16:24)  Source: Clean Catch Clean Catch (Midstream)  Final Report (16 Feb 2022 13:24):    No growth    Culture - Blood (collected 14 Feb 2022 12:17)  Source: .Blood Blood-Peripheral  Preliminary Report (15 Feb 2022 13:02):    No growth to date.    Culture - Blood (collected 12 Feb 2022 12:06)  Source: .Blood Blood-Peripheral  Gram Stain (13 Feb 2022 08:12):    Growth in aerobic bottle: Gram Positive Cocci in Clusters    Growth in anaerobic bottle: Gram Positive Cocci in Clusters  Final Report (14 Feb 2022 07:16):    Growth in aerobic and anaerobic bottles: Staphylococcus aureus    See previous culture [id number]    Culture - Blood (collected 12 Feb 2022 12:06)  Source: .Blood Blood-Peripheral  Gram Stain (13 Feb 2022 08:15):    Growth in aerobic bottle: Gram Positive Cocci in Clusters    Growth in anaerobic bottle: Gram Positive Cocci in Clusters  Final Report (14 Feb 2022 07:15):    Growth in aerobic and anaerobic bottles: Staphylococcus aureus    See previous culture [id number]    Culture - Blood (collected 11 Feb 2022 16:19)  Source: .Blood Blood-Peripheral  Gram Stain (12 Feb 2022 07:00):    Growth in aerobic bottle: Gram Positive Cocci in Clusters    Growth in anaerobic bottle: Gram Positive Cocci in Clusters  Final Report (14 Feb 2022 07:13):    Growth in aerobic and anaerobic bottles: Staphylococcus aureus    ***Blood Panel PCR results on this specimen are available    approximately 3 hours after the Gram stain result.***    Gram stain, PCR, and/or culture results may not always    correspond dueto difference in methodologies.    ************************************************************    This PCR assay was performed by multiplex PCR. This    Assay tests for 66 bacterial and resistance gene targets.    Please refer to the [facility] Labs test directory    at [email or web address] for details.  Organism: Blood Culture PCR  Staphylococcus aureus (14 Feb 2022 07:13)  Organism: Staphylococcus aureus (14 Feb 2022 07:13)      -  Ampicillin/Sulbactam: S <=8/4      -  Cefazolin: S <=4      -  Clindamycin: R <=0.25 This isolate is presumed to be clindamycin resistant based on detection of inducible resistance. Clindamycin may still be effective in some patients.      -  Erythromycin: R >4      -  Gentamicin: S <=1 Should not be used as monotherapy      -  Oxacillin: S 0.5      -  Penicillin: R 4      -  Rifampin: S <=1 Should not be used as monotherapy      -  Tetra/Doxy: S <=1      -  Trimethoprim/Sulfamethoxazole: S <=0.5/9.5      -  Vancomycin: S 1      Method Type: BAY  Organism: Blood Culture PCR (14 Feb 2022 07:13)      -  Staphylococcus aureus: Detec Any isolate of Staphylococcus aureus from a blood culture is NOT considered a contaminant.      Method Type: PCR    Culture - Blood (collected 11 Feb 2022 16:19)  Source: .Blood Blood-Peripheral  Gram Stain (12 Feb 2022 06:59):    Growth in aerobic bottle: Gram Positive Cocci in Clusters    Growth in anaerobic bottle: Gram Positive Cocci in Clusters  Final Report (14 Feb 2022 07:13):    Growth in aerobic and anaerobic bottles: Staphylococcus aureus    See previous culture [id number]    RADIOLOGY & ADDITIONAL STUDIES:  ACC: [id number] EXAM:  XR CHEST PORTABLE IMMED 1V                          PROCEDURE DATE:  02/11/2022          INTERPRETATION:  Portable chest radiograph    CLINICAL INFORMATION: Dyspnea, shortness of breath.    TECHNIQUE:  Portable  AP chest radiograph.    COMPARISON: 1/14/2022 chest .    FINDINGS:  CATHETERS AND TUBES: None    PULMONARY: Unchanged moderate RIGHT pleural effusion and/or basilar   airspace disease obscuring diaphragm contour. RIGHT upper zone and LEFT   lung parenchyma clear..  No pneumothorax.    HEART/VASCULAR: The  heart is grossly enlarged in transverse diameter. No   hilar mass.  Cardiac device wire lead is within right ventricle. .    BONES: Visualized osseous structures are intact.    IMPRESSION:   No interval change..      Assessment :   61 year old male with a history of CAD, end stage chronic systolic heart failure s/p ICD, atrial flutter s/p DCCV, substance abuse, CKD COVID 19 1/23/22 admitted with Acute hypoxic respiratory failure likely sec CHF exacerbation Markedly elevated BNP. On HFNC with Staph Aureus bacteremia  Source of bacteremia unclear   Prob skin source- pt c/o mild tenderness at old IV sites on left arm though no appreciable induration erythema or palpable cord  Has AICD  Doubt resp failure sec COVID 19- pt had been positive since 1/23/22.  Pt is vaccinated against COVID 19.  C/o back pain  DERRICK better  Echo neg for endocarditis    Plan :   For WBC scan to rule out discitis  Unable to do MRI due to AICD  Cont Ancef  2grams q8  Fu repeat blood cultures- NGTD  Cont Decadron 6mg po daily short course day 5  No need for Remdesivir as doubt active COVID 19 infection  Diurese per cardiology  Trend temps and cbc  Wean down 02 requirements as tolerated    Continue with present regiment.  Appropriate use of antibiotics and adverse effects reviewed.      > 35 minutes were spent in direct patient care reviewing notes, medications ,labs data/ imaging , discussion with multidisciplinary team.    Thank you for allowing me to participate in care of your patient .    Kelley Phan MD  Infectious Disease  [phone]
     DOROTHY VOSS is a 61yMale , patient examined and chart reviewed.     INTERVAL HPI/ OVERNIGHT EVENTS:   On 4L NC. Feeling better.  Afebrile.    PAST MEDICAL & SURGICAL HISTORY:  Heart failure, systolic  CAD (coronary artery disease)  Hypertension  Nonischemic cardiomyopathy  COPD, moderate  2019 novel coronavirus disease (COVID-19)  Substance abuse  HLD (hyperlipidemia)  Cardiac LV ejection fraction 10-20%  AICD (automatic cardioverter/defibrillator) present  Cardiac LV ejection fraction 10-20%      For details regarding the patient's social history, family history, and other miscellaneous elements, please refer the initial infectious diseases consultation and/or the admitting history and physical examination for this admission.    ROS:  CONSTITUTIONAL:  Negative fever or chills + back pain  EYES:  Negative  blurry vision or double vision  CARDIOVASCULAR:  Negative for chest pain or palpitations  RESPIRATORY:  Negative for cough, wheezing, or SOB   GASTROINTESTINAL:  Negative for nausea, vomiting, diarrhea, constipation, or abdominal pain  GENITOURINARY:  Negative frequency, urgency or dysuria  NEUROLOGIC:  No headache, confusion, dizziness, lightheadedness  All other systems were reviewed and are negative     ALLERGIES:  pork (Other)      Current inpatient medications :    ANTIBIOTICS/RELEVANT:  ceFAZolin   IVPB 1000 milliGRAM(s) IV Intermittent every 8 hours    MEDICATIONS  (STANDING):  aMIOdarone    Tablet 200 milliGRAM(s) Oral daily  apixaban 5 milliGRAM(s) Oral every 12 hours  aspirin enteric coated 81 milliGRAM(s) Oral daily  atorvastatin 40 milliGRAM(s) Oral at bedtime  buMETAnide 2 milliGRAM(s) Oral two times a day  dexAMETHasone     Tablet 6 milliGRAM(s) Oral daily  gabapentin 100 milliGRAM(s) Oral three times a day  lactobacillus acidophilus 1 Tablet(s) Oral two times a day  lidocaine   4% Patch 1 Patch Transdermal daily  metoprolol succinate ER 25 milliGRAM(s) Oral daily  pantoprazole    Tablet 40 milliGRAM(s) Oral before breakfast  sacubitril 24 mG/valsartan 26 mG 1 Tablet(s) Oral two times a day  spironolactone 25 milliGRAM(s) Oral daily    MEDICATIONS  (PRN):  acetaminophen     Tablet .. 650 milliGRAM(s) Oral every 6 hours PRN Temp greater or equal to 38C (100.4F), Mild Pain (1 - 3)  ALBUTerol    90 MICROgram(s) HFA Inhaler 2 Puff(s) Inhalation every 6 hours PRN Shortness of Breath and/or Wheezing  aluminum hydroxide/magnesium hydroxide/simethicone Suspension 30 milliLiter(s) Oral every 4 hours PRN Dyspepsia  guaiFENesin Oral Liquid (Sugar-Free) 200 milliGRAM(s) Oral every 6 hours PRN Cough  melatonin 3 milliGRAM(s) Oral at bedtime PRN Insomnia  ondansetron Injectable 4 milliGRAM(s) IV Push every 8 hours PRN Nausea and/or Vomiting  simethicone 80 milliGRAM(s) Chew three times a day PRN Indigestion        Objective:  Vital Signs Last 24 Hrs  T(C): 36.6 (17 Feb 2022 18:35), Max: 36.8 (17 Feb 2022 10:55)  T(F): 97.8 (17 Feb 2022 18:35), Max: 98.3 (17 Feb 2022 10:55)  HR: 66 (17 Feb 2022 18:35) (61 - 67)  BP: 108/73 (17 Feb 2022 18:35) (99/64 - 117/81)  RR: 17 (17 Feb 2022 18:35) (17 - 18)  SpO2: 95% (17 Feb 2022 18:35) (95% - 98%)      Physical Exam:  General: no acute distress  Neck: supple, trachea midline  Lungs: Decreased, no wheeze/rhonchi  Cardiovascular: regular rate and rhythm, S1 S2  Abdomen: soft, nontender,  bowel sounds normal  Neurological: alert and oriented x3  Skin: no rash  Extremities: + edema      LABS:                        14.9   9.70  )-----------( 206      ( 17 Feb 2022 07:27 )             47.2   02-17    134<L>  |  97  |  68<H>  ----------------------------<  213<H>  4.7   |  29  |  1.40<H>    Ca    7.9<L>      17 Feb 2022 07:27    MICROBIOLOGY:  Culture - Urine (collected 15 Feb 2022 16:24)  Source: Clean Catch Clean Catch (Midstream)  Final Report (16 Feb 2022 13:24):    No growth    Culture - Blood (collected 14 Feb 2022 12:17)  Source: .Blood Blood-Peripheral  Preliminary Report (15 Feb 2022 13:02):    No growth to date.    Culture - Blood (collected 12 Feb 2022 12:06)  Source: .Blood Blood-Peripheral  Gram Stain (13 Feb 2022 08:12):    Growth in aerobic bottle: Gram Positive Cocci in Clusters    Growth in anaerobic bottle: Gram Positive Cocci in Clusters  Final Report (14 Feb 2022 07:16):    Growth in aerobic and anaerobic bottles: Staphylococcus aureus    See previous culture [id number]    Culture - Blood (collected 12 Feb 2022 12:06)  Source: .Blood Blood-Peripheral  Gram Stain (13 Feb 2022 08:15):    Growth in aerobic bottle: Gram Positive Cocci in Clusters    Growth in anaerobic bottle: Gram Positive Cocci in Clusters  Final Report (14 Feb 2022 07:15):    Growth in aerobic and anaerobic bottles: Staphylococcus aureus    See previous culture [id number]    Culture - Blood (collected 11 Feb 2022 16:19)  Source: .Blood Blood-Peripheral  Gram Stain (12 Feb 2022 07:00):    Growth in aerobic bottle: Gram Positive Cocci in Clusters    Growth in anaerobic bottle: Gram Positive Cocci in Clusters  Final Report (14 Feb 2022 07:13):    Growth in aerobic and anaerobic bottles: Staphylococcus aureus    ***Blood Panel PCR results on this specimen are available    approximately 3 hours after the Gram stain result.***    Gram stain, PCR, and/or culture results may not always    correspond dueto difference in methodologies.    ************************************************************    This PCR assay was performed by multiplex PCR. This    Assay tests for 66 bacterial and resistance gene targets.    Please refer to the [facility] Labs test directory    at [email or web address] for details.  Organism: Blood Culture PCR  Staphylococcus aureus (14 Feb 2022 07:13)  Organism: Staphylococcus aureus (14 Feb 2022 07:13)      -  Ampicillin/Sulbactam: S <=8/4      -  Cefazolin: S <=4      -  Clindamycin: R <=0.25 This isolate is presumed to be clindamycin resistant based on detection of inducible resistance. Clindamycin may still be effective in some patients.      -  Erythromycin: R >4      -  Gentamicin: S <=1 Should not be used as monotherapy      -  Oxacillin: S 0.5      -  Penicillin: R 4      -  Rifampin: S <=1 Should not be used as monotherapy      -  Tetra/Doxy: S <=1      -  Trimethoprim/Sulfamethoxazole: S <=0.5/9.5      -  Vancomycin: S 1      Method Type: BAY  Organism: Blood Culture PCR (14 Feb 2022 07:13)      -  Staphylococcus aureus: Detec Any isolate of Staphylococcus aureus from a blood culture is NOT considered a contaminant.      Method Type: PCR    Culture - Blood (collected 11 Feb 2022 16:19)  Source: .Blood Blood-Peripheral  Gram Stain (12 Feb 2022 06:59):    Growth in aerobic bottle: Gram Positive Cocci in Clusters    Growth in anaerobic bottle: Gram Positive Cocci in Clusters  Final Report (14 Feb 2022 07:13):    Growth in aerobic and anaerobic bottles: Staphylococcus aureus    See previous culture [id number]    RADIOLOGY & ADDITIONAL STUDIES:  ACC: [id number] EXAM:  XR CHEST PORTABLE IMMED 1V                          PROCEDURE DATE:  02/11/2022          INTERPRETATION:  Portable chest radiograph    CLINICAL INFORMATION: Dyspnea, shortness of breath.    TECHNIQUE:  Portable  AP chest radiograph.    COMPARISON: 1/14/2022 chest .    FINDINGS:  CATHETERS AND TUBES: None    PULMONARY: Unchanged moderate RIGHT pleural effusion and/or basilar   airspace disease obscuring diaphragm contour. RIGHT upper zone and LEFT   lung parenchyma clear..  No pneumothorax.    HEART/VASCULAR: The  heart is grossly enlarged in transverse diameter. No   hilar mass.  Cardiac device wire lead is within right ventricle. .    BONES: Visualized osseous structures are intact.    IMPRESSION:   No interval change..      Assessment :   61 year old male with a history of CAD, end stage chronic systolic heart failure s/p ICD, atrial flutter s/p DCCV, substance abuse, CKD COVID 19 1/23/22 admitted with Acute hypoxic respiratory failure likely sec CHF exacerbation Markedly elevated BNP. On HFNC with Staph Aureus bacteremia  Source of bacteremia unclear   Prob skin source- pt c/o mild tenderness at old IV sites on left arm though no appreciable induration erythema or palpable cord  Has AICD  Doubt resp failure sec COVID 19- pt had been positive since 1/23/22.  Pt is vaccinated against COVID 19.  C/o back pain  DERRICK better  Echo neg for endocarditis  Had WBC scan today to rule out discitis  Unable to do MRI due to AICD    Plan :  Cont Ancef  2grams q8 x 6 weeks 3/26/22  PICC line in am  Fu Gallium scan  Fu repeat blood cultures- NGTD  Stop Decadron today  Diurese per cardiology  Trend temps and cbc  Wean down 02 requirements as tolerated    Continue with present regiment.  Appropriate use of antibiotics and adverse effects reviewed.      > 35 minutes were spent in direct patient care reviewing notes, medications ,labs data/ imaging , discussion with multidisciplinary team.    Thank you for allowing me to participate in care of your patient .    Kelley Phan MD  Infectious Disease  [phone]
     DOROTHY VOSS is a 61yMale , patient examined and chart reviewed.     INTERVAL HPI/ OVERNIGHT EVENTS:   Remains on HFNC.  Blood cultures still positive with Staph Aureus.  C/o back pain.    PAST MEDICAL & SURGICAL HISTORY:  Heart failure, systolic  CAD (coronary artery disease)  Hypertension  Nonischemic cardiomyopathy  COPD, moderate  2019 novel coronavirus disease (COVID-19)  Substance abuse  HLD (hyperlipidemia)  Cardiac LV ejection fraction 10-20%  AICD (automatic cardioverter/defibrillator) present  Cardiac LV ejection fraction 10-20%      For details regarding the patient's social history, family history, and other miscellaneous elements, please refer the initial infectious diseases consultation and/or the admitting history and physical examination for this admission.    ROS:  CONSTITUTIONAL:  Negative fever or chills + back pain  EYES:  Negative  blurry vision or double vision  CARDIOVASCULAR:  Negative for chest pain or palpitations  RESPIRATORY:  Negative for cough, wheezing, or SOB   GASTROINTESTINAL:  Negative for nausea, vomiting, diarrhea, constipation, or abdominal pain  GENITOURINARY:  Negative frequency, urgency or dysuria  NEUROLOGIC:  No headache, confusion, dizziness, lightheadedness  All other systems were reviewed and are negative     ALLERGIES:  pork (Other)      Current inpatient medications :    ANTIBIOTICS/RELEVANT:  ceFAZolin   IVPB 1000 milliGRAM(s) IV Intermittent every 8 hours    MEDICATIONS  (STANDING):  aMIOdarone    Tablet 200 milliGRAM(s) Oral daily  apixaban 5 milliGRAM(s) Oral every 12 hours  aspirin enteric coated 81 milliGRAM(s) Oral daily  atorvastatin 40 milliGRAM(s) Oral at bedtime  buDESOnide    Inhalation Suspension 0.5 milliGRAM(s) Inhalation two times a day  buMETAnide Injectable 2 milliGRAM(s) IV Push every 12 hours  dexAMETHasone     Tablet 6 milliGRAM(s) Oral daily  gabapentin 100 milliGRAM(s) Oral three times a day  lactobacillus acidophilus 1 Tablet(s) Oral two times a day  metoprolol succinate ER 25 milliGRAM(s) Oral daily  pantoprazole    Tablet 40 milliGRAM(s) Oral before breakfast    MEDICATIONS  (PRN):  acetaminophen     Tablet .. 650 milliGRAM(s) Oral every 6 hours PRN Temp greater or equal to 38C (100.4F), Mild Pain (1 - 3)  aluminum hydroxide/magnesium hydroxide/simethicone Suspension 30 milliLiter(s) Oral every 4 hours PRN Dyspepsia  melatonin 3 milliGRAM(s) Oral at bedtime PRN Insomnia  ondansetron Injectable 4 milliGRAM(s) IV Push every 8 hours PRN Nausea and/or Vomiting  simethicone 80 milliGRAM(s) Chew three times a day PRN Indigestion          Objective:  Vital Signs Last 24 Hrs  T(C): 36.6 (13 Feb 2022 11:05), Max: 36.7 (12 Feb 2022 20:16)  T(F): 97.8 (13 Feb 2022 11:05), Max: 98 (12 Feb 2022 20:16)  HR: 71 (13 Feb 2022 11:05) (68 - 78)  BP: 125/88 (13 Feb 2022 11:05) (120/90 - 125/88)-  RR: 21 (13 Feb 2022 11:05) (17 - 21)  SpO2: 100% (13 Feb 2022 11:05) (95% - 100%)      Physical Exam:  General: no acute distress HFNC  Neck: supple, trachea midline  Lungs: Decreased, no wheeze/rhonchi  Cardiovascular: regular rate and rhythm, S1 S2  Abdomen: soft, nontender,  bowel sounds normal  Neurological: alert and oriented x3  Skin: no rash  Extremities: + edema      LABS:                        12.7   11.78 )-----------( 175      ( 13 Feb 2022 08:21 )             39.4   02-13    134<L>  |  97  |  65<H>  ----------------------------<  162<H>  4.5   |  30  |  1.50<H>    Ca    8.7      13 Feb 2022 08:21  Phos  4.1     02-12  Mg     2.6     02-12    TPro  5.6<L>  /  Alb  2.3<L>  /  TBili  2.9<H>  /  DBili  x   /  AST  34  /  ALT  42  /  AlkPhos  293<H>  02-12      MICROBIOLOGY:    Culture - Blood (collected 12 Feb 2022 12:06)  Source: .Blood Blood-Peripheral  Gram Stain (13 Feb 2022 08:12):    Growth in aerobic bottle: Gram Positive Cocci in Clusters    Growth in anaerobic bottle: Gram Positive Cocci in Clusters  Preliminary Report (13 Feb 2022 08:12):    Growth in aerobic bottle: Gram Positive Cocci in Clusters    Growth in anaerobic bottle: Gram Positive Cocci in Clusters    Culture - Blood (collected 12 Feb 2022 12:06)  Source: .Blood Blood-Peripheral  Gram Stain (13 Feb 2022 08:15):    Growth in aerobic bottle: Gram Positive Cocci in Clusters    Growth in anaerobic bottle: Gram Positive Cocci in Clusters  Preliminary Report (13 Feb 2022 08:15):    Growth in aerobic bottle: Gram Positive Cocci in Clusters    Growth in anaerobic bottle: Gram Positive Cocci in Clusters    Culture - Blood (collected 11 Feb 2022 16:19)  Source: .Blood Blood-Peripheral  Gram Stain (12 Feb 2022 07:00):    Growth in aerobic bottle: Gram Positive Cocci in Clusters    Growth in anaerobic bottle: Gram Positive Cocci in Clusters  Preliminary Report (13 Feb 2022 10:57):    Growth in aerobic and anaerobic bottles: Staphylococcus aureus    ***Blood Panel PCR results on this specimen are available    approximately 3 hours after the Gram stain result.***    Gram stain, PCR, and/or culture results may not always    correspond dueto difference in methodologies.    ************************************************************    This PCR assay was performed by multiplex PCR. This    Assay tests for 66 bacterial and resistance gene targets.    Please refer to the [facility] test directory    at [email or web address] for details.  Organism: Blood Culture PCR (12 Feb 2022 05:44)  Organism: Blood Culture PCR (12 Feb 2022 05:44)      -  Staphylococcus aureus: Detec Any isolate of Staphylococcus aureus from a blood culture is NOT considered a contaminant.      Method Type: PCR    Culture - Blood (collected 11 Feb 2022 16:19)  Source: .Blood Blood-Peripheral  Gram Stain (12 Feb 2022 06:59):    Growth in aerobic bottle: Gram Positive Cocci in Clusters    Growth in anaerobic bottle: Gram Positive Cocci in Clusters  Preliminary Report (13 Feb 2022 10:29):    Growth in aerobic and anaerobic bottles: Staphylococcus aureus    See previous culture [id number]    RADIOLOGY & ADDITIONAL STUDIES:  ACC: [id number] EXAM:  XR CHEST PORTABLE IMMED 1V                          PROCEDURE DATE:  02/11/2022          INTERPRETATION:  Portable chest radiograph    CLINICAL INFORMATION: Dyspnea, shortness of breath.    TECHNIQUE:  Portable  AP chest radiograph.    COMPARISON: 1/14/2022 chest .    FINDINGS:  CATHETERS AND TUBES: None    PULMONARY: Unchanged moderate RIGHT pleural effusion and/or basilar   airspace disease obscuring diaphragm contour. RIGHT upper zone and LEFT   lung parenchyma clear..  No pneumothorax.    HEART/VASCULAR: The  heart is grossly enlarged in transverse diameter. No   hilar mass.  Cardiac device wire lead is within right ventricle. .    BONES: Visualized osseous structures are intact.    IMPRESSION:   No interval change..      Assessment :   61 year old male with a history of CAD, end stage chronic systolic heart failure s/p ICD, atrial flutter s/p DCCV, substance abuse, CKD COVID 19 1/23/22 admitted with Acute hypoxic respiratory failure likely sec CHF exacerbation Markedly elevated BNP. On HFNC with Staph Aureus bacteremia  Source of bacteremia unclear   Prob skin source- pt c/o mild tenderness at old IV sites on left arm though no appreciable induration erythema or palpable cord  Has AICD  Doubt resp failure sec COVID 19- pt had been positive since 1/23/22.  Pt is vaccinated against COVID 19.  C/o back pain  DERRICK better    Plan :   Will get WBC scan to rule out discitis  Unable to do MRI due to AICD  Cont Ancef, increase to 2grams q8  Fu repeat blood cultures  Cont Decadron 6mg po daily short course  No need for Remdesivir  Diurese per cardiology  Trend temps and cbc  Check echo  Wean down 02 requirements as tolerated    Continue with present regiment.  Appropriate use of antibiotics and adverse effects reviewed.      > 35 minutes were spent in direct patient care reviewing notes, medications ,labs data/ imaging , discussion with multidisciplinary team.    Thank you for allowing me to participate in care of your patient .    Kelley Phan MD  Infectious Disease  [phone]
    DOROTHY MCKEONSON    PLV TELE 314 D1    Allergies    No Known Allergies    Intolerances    pork (Other)      PAST MEDICAL & SURGICAL HISTORY:  Heart failure, systolic    CAD (coronary artery disease)    Hypertension    Nonischemic cardiomyopathy    COPD, moderate    2019 novel coronavirus disease (COVID-19)    Substance abuse    HLD (hyperlipidemia)    Cardiac LV ejection fraction 10-20%    AICD (automatic cardioverter/defibrillator) present    Cardiac LV ejection fraction 10-20%        FAMILY HISTORY:  FH: lung cancer        Home Medications:  acetaminophen 325 mg oral tablet: 2 tab(s) orally every 6 hours, As needed, Temp greater or equal to 38C (100.4F), Mild Pain (1 - 3) (13 Feb 2022 10:49)  Aquaphor Healing topical ointment: Apply topically to affected area once a day (13 Feb 2022 10:49)  Bacid (LAC) oral capsule: 2 cap(s) orally once a day (13 Feb 2022 10:49)  DuoNeb 0.5 mg-2.5 mg/3 mL inhalation solution: 3 milliliter(s) inhaled 4 times a day, As Needed (13 Feb 2022 10:49)  furosemide 20 mg oral tablet: 1 tab(s) orally once a day (13 Feb 2022 10:49)  gabapentin 100 mg oral capsule: 1 cap(s) orally 3 times a day (13 Feb 2022 10:49)  melatonin 3 mg oral tablet: 1 tab(s) orally once a day (at bedtime), As needed, Insomnia (13 Feb 2022 10:49)  metoprolol succinate 100 mg oral tablet, extended release: 1 tab(s) orally once a day (13 Feb 2022 10:49)  simethicone 80 mg oral tablet: 2 tab(s) orally every 6 hours, As Needed (13 Feb 2022 10:49)  Symbicort 160 mcg-4.5 mcg/inh inhalation aerosol: 2 puff(s) inhaled 2 times a day (13 Feb 2022 10:49)  Ventolin HFA 90 mcg/inh inhalation aerosol: 2 puff(s) inhaled 3 times a day for 5 days. Start: 02/10/22 (13 Feb 2022 10:49)  Ventolin HFA 90 mcg/inh inhalation aerosol: 2 puff(s) inhaled every 6 hours, As Needed (13 Feb 2022 10:49)      MEDICATIONS  (STANDING):  aMIOdarone    Tablet 200 milliGRAM(s) Oral daily  apixaban 5 milliGRAM(s) Oral every 12 hours  aspirin enteric coated 81 milliGRAM(s) Oral daily  atorvastatin 40 milliGRAM(s) Oral at bedtime  buDESOnide    Inhalation Suspension 0.5 milliGRAM(s) Inhalation two times a day  buMETAnide Injectable 2 milliGRAM(s) IV Push every 12 hours  ceFAZolin   IVPB      ceFAZolin   IVPB 1000 milliGRAM(s) IV Intermittent every 8 hours  dexAMETHasone     Tablet 6 milliGRAM(s) Oral daily  gabapentin 100 milliGRAM(s) Oral three times a day  lactobacillus acidophilus 1 Tablet(s) Oral two times a day  metoprolol succinate ER 25 milliGRAM(s) Oral daily  pantoprazole    Tablet 40 milliGRAM(s) Oral before breakfast    MEDICATIONS  (PRN):  acetaminophen     Tablet .. 650 milliGRAM(s) Oral every 6 hours PRN Temp greater or equal to 38C (100.4F), Mild Pain (1 - 3)  aluminum hydroxide/magnesium hydroxide/simethicone Suspension 30 milliLiter(s) Oral every 4 hours PRN Dyspepsia  melatonin 3 milliGRAM(s) Oral at bedtime PRN Insomnia  ondansetron Injectable 4 milliGRAM(s) IV Push every 8 hours PRN Nausea and/or Vomiting  simethicone 80 milliGRAM(s) Chew three times a day PRN Indigestion      Diet, Consistent Carbohydrate w/Evening Snack:   Soft and Bite Sized (SOFTBTSZ)  Low Sodium  Supplement Feeding Modality:  Oral  Glucerna Shake Cans or Servings Per Day:  1       Frequency:  Two Times a day (02-12-22 @ 11:55) [Pending Verification By Attending]          Vital Signs Last 24 Hrs  T(C): 36.7 (12 Feb 2022 20:16), Max: 36.7 (12 Feb 2022 11:23)  T(F): 98 (12 Feb 2022 20:16), Max: 98.1 (12 Feb 2022 11:23)  HR: 68 (13 Feb 2022 08:09) (68 - 91)  BP: 120/90 (12 Feb 2022 20:16) (120/90 - 121/88)  BP(mean): --  RR: 17 (12 Feb 2022 20:16) (17 - 25)  SpO2: 99% (13 Feb 2022 08:09) (95% - 99%)      02-12-22 @ 07:01  -  02-13-22 @ 07:00  --------------------------------------------------------  IN: 50 mL / OUT: 1200 mL / NET: -1150 mL              LABS:                        12.7   11.78 )-----------( 175      ( 13 Feb 2022 08:21 )             39.4     02-13    134<L>  |  97  |  65<H>  ----------------------------<  162<H>  4.5   |  30  |  1.50<H>    Ca    8.7      13 Feb 2022 08:21  Phos  4.1     02-12  Mg     2.6     02-12    TPro  5.6<L>  /  Alb  2.3<L>  /  TBili  2.9<H>  /  DBili  x   /  AST  34  /  ALT  42  /  AlkPhos  293<H>  02-12    PT/INR - ( 12 Feb 2022 05:48 )   PT: 30.0 sec;   INR: 2.69 ratio               ABG - ( 11 Feb 2022 20:45 )  pH, Arterial: 7.46  pH, Blood: x     /  pCO2: 34    /  pO2: 78    / HCO3: 24    / Base Excess: 0.4   /  SaO2: 97.3                WBC:  WBC Count: 11.78 K/uL (02-13 @ 08:21)  WBC Count: 15.77 K/uL (02-12 @ 05:48)  WBC Count: 17.56 K/uL (02-11 @ 10:10)      MICROBIOLOGY:  RECENT CULTURES:  02-12 .Blood Blood-Peripheral XXXX   Growth in aerobic bottle: Gram Positive Cocci in Clusters  Growth in anaerobic bottle: Gram Positive Cocci in Clusters   Growth in aerobic bottle: Gram Positive Cocci in Clusters  Growth in anaerobic bottle: Gram Positive Cocci in Clusters    02-11 .Blood Blood-Peripheral Blood Culture PCR   Growth in aerobic bottle: Gram Positive Cocci in Clusters  Growth in anaerobic bottle: Gram Positive Cocci in Clusters   Growth in aerobic and anaerobic bottles: Staphylococcus aureus  See previous culture [id number]                PT/INR - ( 12 Feb 2022 05:48 )   PT: 30.0 sec;   INR: 2.69 ratio             Sodium:  Sodium, Serum: 134 mmol/L (02-13 @ 08:21)  Sodium, Serum: 133 mmol/L (02-12 @ 05:48)  Sodium, Serum: 129 mmol/L (02-11 @ 10:10)      1.50 mg/dL 02-13 @ 08:21  1.60 mg/dL 02-12 @ 05:48  1.60 mg/dL 02-11 @ 10:10      Hemoglobin:  Hemoglobin: 12.7 g/dL (02-13 @ 08:21)  Hemoglobin: 13.6 g/dL (02-12 @ 05:48)  Hemoglobin: 14.5 g/dL (02-11 @ 10:10)      Platelets: Platelet Count - Automated: 175 K/uL (02-13 @ 08:21)  Platelet Count - Automated: 173 K/uL (02-12 @ 05:48)  Platelet Count - Automated: 204 K/uL (02-11 @ 10:10)      LIVER FUNCTIONS - ( 12 Feb 2022 05:48 )  Alb: 2.3 g/dL / Pro: 5.6 g/dL / ALK PHOS: 293 U/L / ALT: 42 U/L / AST: 34 U/L / GGT: x                 RADIOLOGY & ADDITIONAL STUDIES:      MICROBIOLOGY:  RECENT CULTURES:  02-12 .Blood Blood-Peripheral XXXX   Growth in aerobic bottle: Gram Positive Cocci in Clusters  Growth in anaerobic bottle: Gram Positive Cocci in Clusters   Growth in aerobic bottle: Gram Positive Cocci in Clusters  Growth in anaerobic bottle: Gram Positive Cocci in Clusters    02-11 .Blood Blood-Peripheral Blood Culture PCR   Growth in aerobic bottle: Gram Positive Cocci in Clusters  Growth in anaerobic bottle: Gram Positive Cocci in Clusters   Growth in aerobic and anaerobic bottles: Staphylococcus aureus  See previous culture [id number]            
    DOROTHY VOSS    PLV TELE 304 W1    Allergies    No Known Allergies    Intolerances    pork (Other)      PAST MEDICAL & SURGICAL HISTORY:  Heart failure, systolic    CAD (coronary artery disease)    Hypertension    Nonischemic cardiomyopathy    COPD, moderate    2019 novel coronavirus disease (COVID-19)    Substance abuse    HLD (hyperlipidemia)    Cardiac LV ejection fraction 10-20%    AICD (automatic cardioverter/defibrillator) present    Cardiac LV ejection fraction 10-20%        FAMILY HISTORY:  FH: lung cancer        Home Medications:  acetaminophen 325 mg oral tablet: 2 tab(s) orally every 6 hours, As needed, Temp greater or equal to 38C (100.4F), Mild Pain (1 - 3) (13 Feb 2022 10:49)  Aquaphor Healing topical ointment: Apply topically to affected area once a day (13 Feb 2022 10:49)  Bacid (LAC) oral capsule: 2 cap(s) orally once a day (13 Feb 2022 10:49)  DuoNeb 0.5 mg-2.5 mg/3 mL inhalation solution: 3 milliliter(s) inhaled 4 times a day, As Needed (13 Feb 2022 10:49)  furosemide 20 mg oral tablet: 1 tab(s) orally once a day (13 Feb 2022 10:49)  gabapentin 100 mg oral capsule: 1 cap(s) orally 3 times a day (13 Feb 2022 10:49)  melatonin 3 mg oral tablet: 1 tab(s) orally once a day (at bedtime), As needed, Insomnia (13 Feb 2022 10:49)  metoprolol succinate 100 mg oral tablet, extended release: 1 tab(s) orally once a day (13 Feb 2022 10:49)  simethicone 80 mg oral tablet: 2 tab(s) orally every 6 hours, As Needed (13 Feb 2022 10:49)  Symbicort 160 mcg-4.5 mcg/inh inhalation aerosol: 2 puff(s) inhaled 2 times a day (13 Feb 2022 10:49)  Ventolin HFA 90 mcg/inh inhalation aerosol: 2 puff(s) inhaled 3 times a day for 5 days. Start: 02/10/22 (13 Feb 2022 10:49)  Ventolin HFA 90 mcg/inh inhalation aerosol: 2 puff(s) inhaled every 6 hours, As Needed (13 Feb 2022 10:49)      MEDICATIONS  (STANDING):  aMIOdarone    Tablet 200 milliGRAM(s) Oral daily  apixaban 5 milliGRAM(s) Oral every 12 hours  aspirin enteric coated 81 milliGRAM(s) Oral daily  atorvastatin 40 milliGRAM(s) Oral at bedtime  buMETAnide 2 milliGRAM(s) Oral two times a day  ceFAZolin   IVPB 2000 milliGRAM(s) IV Intermittent every 8 hours  gabapentin 100 milliGRAM(s) Oral three times a day  lactobacillus acidophilus 1 Tablet(s) Oral two times a day  lidocaine   4% Patch 1 Patch Transdermal daily  metoprolol succinate ER 25 milliGRAM(s) Oral daily  pantoprazole    Tablet 40 milliGRAM(s) Oral before breakfast  sacubitril 24 mG/valsartan 26 mG 1 Tablet(s) Oral two times a day  spironolactone 25 milliGRAM(s) Oral daily    MEDICATIONS  (PRN):  acetaminophen     Tablet .. 650 milliGRAM(s) Oral every 6 hours PRN Temp greater or equal to 38C (100.4F), Mild Pain (1 - 3)  ALBUTerol    90 MICROgram(s) HFA Inhaler 2 Puff(s) Inhalation every 6 hours PRN Shortness of Breath and/or Wheezing  aluminum hydroxide/magnesium hydroxide/simethicone Suspension 30 milliLiter(s) Oral every 4 hours PRN Dyspepsia  guaiFENesin Oral Liquid (Sugar-Free) 200 milliGRAM(s) Oral every 6 hours PRN Cough  melatonin 3 milliGRAM(s) Oral at bedtime PRN Insomnia  ondansetron Injectable 4 milliGRAM(s) IV Push every 8 hours PRN Nausea and/or Vomiting  simethicone 80 milliGRAM(s) Chew three times a day PRN Indigestion      Diet, Consistent Carbohydrate w/Evening Snack:   Soft and Bite Sized (SOFTBTSZ)  Low Sodium  Supplement Feeding Modality:  Oral  Glucerna Shake Cans or Servings Per Day:  1       Frequency:  Two Times a day (02-12-22 @ 11:55) [Active]          Vital Signs Last 24 Hrs  T(C): 36.2 (18 Feb 2022 05:03), Max: 36.8 (17 Feb 2022 10:55)  T(F): 97.2 (18 Feb 2022 05:03), Max: 98.3 (17 Feb 2022 10:55)  HR: 71 (18 Feb 2022 05:03) (61 - 71)  BP: 114/79 (18 Feb 2022 05:03) (99/64 - 114/79)  BP(mean): --  RR: 17 (18 Feb 2022 05:03) (17 - 18)  SpO2: 97% (18 Feb 2022 05:03) (95% - 97%)      02-17-22 @ 07:01  -  02-18-22 @ 07:00  --------------------------------------------------------  IN: 0 mL / OUT: 850 mL / NET: -850 mL              LABS:                        14.9   9.70  )-----------( 206      ( 17 Feb 2022 07:27 )             47.2     02-17    134<L>  |  97  |  68<H>  ----------------------------<  213<H>  4.7   |  29  |  1.40<H>    Ca    7.9<L>      17 Feb 2022 07:27            ABG - ( 17 Feb 2022 10:03 )  pH, Arterial: 7.42  pH, Blood: x     /  pCO2: 53    /  pO2: 85    / HCO3: 34    / Base Excess: 9.9   /  SaO2: 97.8                WBC:  WBC Count: 9.70 K/uL (02-17 @ 07:27)  WBC Count: 9.90 K/uL (02-16 @ 07:27)  WBC Count: 9.76 K/uL (02-15 @ 07:53)      MICROBIOLOGY:  RECENT CULTURES:  02-15 Clean Catch Clean Catch (Midstream) XXXX XXXX   No growth    02-14 .Blood Blood-Peripheral XXXX XXXX   No growth to date.    02-12 .Blood Blood-Peripheral XXXX   Growth in aerobic bottle: Gram Positive Cocci in Clusters  Growth in anaerobic bottle: Gram Positive Cocci in Clusters   Growth in aerobic and anaerobic bottles: Staphylococcus aureus  See previous culture [id number]    02-11 .Blood Blood-Peripheral Blood Culture PCR  Staphylococcus aureus   Growth in aerobic bottle: Gram Positive Cocci in Clusters  Growth in anaerobic bottle: Gram Positive Cocci in Clusters   Growth in aerobic and anaerobic bottles: Staphylococcus aureus  See previous culture [id number]                    Sodium:  Sodium, Serum: 134 mmol/L (02-17 @ 07:27)  Sodium, Serum: 138 mmol/L (02-16 @ 07:27)  Sodium, Serum: 139 mmol/L (02-15 @ 07:53)      1.40 mg/dL 02-17 @ 07:27  1.10 mg/dL 02-16 @ 07:27  1.20 mg/dL 02-15 @ 07:53      Hemoglobin:  Hemoglobin: 14.9 g/dL (02-17 @ 07:27)  Hemoglobin: 13.8 g/dL (02-16 @ 07:27)  Hemoglobin: 13.3 g/dL (02-15 @ 07:53)      Platelets: Platelet Count - Automated: 206 K/uL (02-17 @ 07:27)  Platelet Count - Automated: 193 K/uL (02-16 @ 07:27)  Platelet Count - Automated: 162 K/uL (02-15 @ 07:53)              RADIOLOGY & ADDITIONAL STUDIES:      MICROBIOLOGY:  RECENT CULTURES:  02-15 Clean Catch Clean Catch (Midstream) XXXX XXXX   No growth    02-14 .Blood Blood-Peripheral XXXX XXXX   No growth to date.    02-12 .Blood Blood-Peripheral XXXX   Growth in aerobic bottle: Gram Positive Cocci in Clusters  Growth in anaerobic bottle: Gram Positive Cocci in Clusters   Growth in aerobic and anaerobic bottles: Staphylococcus aureus  See previous culture [id number]    02-11 .Blood Blood-Peripheral Blood Culture PCR  Staphylococcus aureus   Growth in aerobic bottle: Gram Positive Cocci in Clusters  Growth in anaerobic bottle: Gram Positive Cocci in Clusters   Growth in aerobic and anaerobic bottles: Staphylococcus aureus  See previous culture [id number]            
    DOROTHY VOSS    PLV TELE 304 W1    Allergies    No Known Allergies    Intolerances    pork (Other)      PAST MEDICAL & SURGICAL HISTORY:  Heart failure, systolic    CAD (coronary artery disease)    Hypertension    Nonischemic cardiomyopathy    COPD, moderate    2019 novel coronavirus disease (COVID-19)    Substance abuse    HLD (hyperlipidemia)    Cardiac LV ejection fraction 10-20%    AICD (automatic cardioverter/defibrillator) present    Cardiac LV ejection fraction 10-20%        FAMILY HISTORY:  FH: lung cancer        Home Medications:  acetaminophen 325 mg oral tablet: 2 tab(s) orally every 6 hours, As needed, Temp greater or equal to 38C (100.4F), Mild Pain (1 - 3) (13 Feb 2022 10:49)  Aquaphor Healing topical ointment: Apply topically to affected area once a day (13 Feb 2022 10:49)  ascorbic acid 500 mg oral tablet: 1 tab(s) orally once a day (19 Feb 2022 13:16)  Bacid (LAC) oral capsule: 2 cap(s) orally once a day (13 Feb 2022 10:49)  bumetanide 2 mg oral tablet: 1 tab(s) orally 2 times a day (19 Feb 2022 13:16)  ceFAZolin: 2 gram(s) intravenous every 8 hours ,last day 03/26/22 (19 Feb 2022 13:16)  gabapentin 100 mg oral capsule: 1 cap(s) orally 3 times a day (13 Feb 2022 10:49)  melatonin 3 mg oral tablet: 1 tab(s) orally once a day (at bedtime), As needed, Insomnia (13 Feb 2022 10:49)  metoprolol succinate 25 mg oral tablet, extended release: 1 tab(s) orally once a day (19 Feb 2022 13:16)  Multiple Vitamins with Minerals oral tablet: 1 tab(s) orally once a day (19 Feb 2022 13:16)  pantoprazole 40 mg oral delayed release tablet: 1 tab(s) orally once a day (before a meal) (19 Feb 2022 13:16)  sacubitril-valsartan 24 mg-26 mg oral tablet: 1 tab(s) orally 2 times a day (19 Feb 2022 13:26)  simethicone 80 mg oral tablet: 2 tab(s) orally every 6 hours, As Needed (13 Feb 2022 10:49)  spironolactone 25 mg oral tablet: 1 tab(s) orally once a day (19 Feb 2022 13:16)  Symbicort 160 mcg-4.5 mcg/inh inhalation aerosol: 2 puff(s) inhaled 2 times a day (13 Feb 2022 10:49)  Ventolin HFA 90 mcg/inh inhalation aerosol: 2 puff(s) inhaled every 6 hours, As Needed (13 Feb 2022 10:49)      MEDICATIONS  (STANDING):  aMIOdarone    Tablet 200 milliGRAM(s) Oral daily  apixaban 5 milliGRAM(s) Oral every 12 hours  aspirin enteric coated 81 milliGRAM(s) Oral daily  atorvastatin 40 milliGRAM(s) Oral at bedtime  buMETAnide 2 milliGRAM(s) Oral two times a day  ceFAZolin   IVPB 2000 milliGRAM(s) IV Intermittent every 8 hours  chlorhexidine 4% Liquid 1 Application(s) Topical <User Schedule>  gabapentin 100 milliGRAM(s) Oral three times a day  lactobacillus acidophilus 1 Tablet(s) Oral two times a day  lidocaine   4% Patch 1 Patch Transdermal daily  metoprolol succinate ER 25 milliGRAM(s) Oral daily  pantoprazole    Tablet 40 milliGRAM(s) Oral before breakfast  sacubitril 24 mG/valsartan 26 mG 1 Tablet(s) Oral two times a day  spironolactone 25 milliGRAM(s) Oral daily    MEDICATIONS  (PRN):  acetaminophen     Tablet .. 650 milliGRAM(s) Oral every 6 hours PRN Temp greater or equal to 38C (100.4F), Mild Pain (1 - 3)  ALBUTerol    90 MICROgram(s) HFA Inhaler 2 Puff(s) Inhalation every 6 hours PRN Shortness of Breath and/or Wheezing  aluminum hydroxide/magnesium hydroxide/simethicone Suspension 30 milliLiter(s) Oral every 4 hours PRN Dyspepsia  guaiFENesin Oral Liquid (Sugar-Free) 200 milliGRAM(s) Oral every 6 hours PRN Cough  melatonin 3 milliGRAM(s) Oral at bedtime PRN Insomnia  ondansetron Injectable 4 milliGRAM(s) IV Push every 8 hours PRN Nausea and/or Vomiting  simethicone 80 milliGRAM(s) Chew three times a day PRN Indigestion  sodium chloride 0.9% lock flush 10 milliLiter(s) IV Push every 1 hour PRN Pre/post blood products, medications, blood draw, and to maintain line patency      Diet, Consistent Carbohydrate w/Evening Snack:   Low Sodium  Supplement Feeding Modality:  Oral  Glucerna Shake Cans or Servings Per Day:  1       Frequency:  Three Times a day (02-18-22 @ 15:52) [Active]          Vital Signs Last 24 Hrs  T(C): 36.9 (20 Feb 2022 04:35), Max: 37.1 (19 Feb 2022 21:06)  T(F): 98.5 (20 Feb 2022 04:35), Max: 98.7 (19 Feb 2022 21:06)  HR: 81 (20 Feb 2022 04:35) (80 - 83)  BP: 94/58 (20 Feb 2022 04:35) (82/60 - 94/58)  BP(mean): --  RR: 17 (20 Feb 2022 04:35) (17 - 18)  SpO2: 95% (20 Feb 2022 04:35) (91% - 95%)      02-19-22 @ 07:01  -  02-20-22 @ 07:00  --------------------------------------------------------  IN: 0 mL / OUT: 1100 mL / NET: -1100 mL    02-20-22 @ 07:01  -  02-20-22 @ 09:25  --------------------------------------------------------  IN: 0 mL / OUT: 400 mL / NET: -400 mL              LABS:                        16.8   6.17  )-----------( 189      ( 19 Feb 2022 08:07 )             52.2     02-19    135  |  96  |  95<H>  ----------------------------<  115<H>  5.7<H>   |  36<H>  |  1.80<H>    Ca    7.9<L>      19 Feb 2022 09:28                WBC:  WBC Count: 6.17 K/uL (02-19 @ 08:07)  WBC Count: 9.70 K/uL (02-17 @ 07:27)      MICROBIOLOGY:  RECENT CULTURES:  02-15 Clean Catch Clean Catch (Midstream) XXXX XXXX   No growth    02-14 .Blood Blood-Peripheral XXXX XXXX   No Growth Final                    Sodium:  Sodium, Serum: 135 mmol/L (02-19 @ 09:28)  Sodium, Serum: 134 mmol/L (02-17 @ 07:27)      1.80 mg/dL 02-19 @ 09:28  1.40 mg/dL 02-17 @ 07:27      Hemoglobin:  Hemoglobin: 16.8 g/dL (02-19 @ 08:07)  Hemoglobin: 14.9 g/dL (02-17 @ 07:27)      Platelets: Platelet Count - Automated: 189 K/uL (02-19 @ 08:07)  Platelet Count - Automated: 206 K/uL (02-17 @ 07:27)              RADIOLOGY & ADDITIONAL STUDIES:      MICROBIOLOGY:  RECENT CULTURES:  02-15 Clean Catch Clean Catch (Midstream) XXXX XXXX   No growth    02-14 .Blood Blood-Peripheral XXXX XXXX   No Growth Final            
    DOROTHY VOSS    [facility] TELE 304 W1    Allergies    No Known Allergies    Intolerances    pork (Other)      PAST MEDICAL & SURGICAL HISTORY:  Heart failure, systolic    CAD (coronary artery disease)    Hypertension    Nonischemic cardiomyopathy    COPD, moderate    2019 novel coronavirus disease (COVID-19)    Substance abuse    HLD (hyperlipidemia)    Cardiac LV ejection fraction 10-20%    AICD (automatic cardioverter/defibrillator) present    Cardiac LV ejection fraction 10-20%        FAMILY HISTORY:  FH: lung cancer        Home Medications:  acetaminophen 325 mg oral tablet: 2 tab(s) orally every 6 hours, As needed, Temp greater or equal to 38C (100.4F), Mild Pain (1 - 3) (13 Feb 2022 10:49)  Aquaphor Healing topical ointment: Apply topically to affected area once a day (13 Feb 2022 10:49)  ascorbic acid 500 mg oral tablet: 1 tab(s) orally once a day (19 Feb 2022 13:16)  Bacid (LAC) oral capsule: 2 cap(s) orally once a day (13 Feb 2022 10:49)  bumetanide 2 mg oral tablet: 1 tab(s) orally 2 times a day (19 Feb 2022 13:16)  ceFAZolin: 2 gram(s) intravenous every 8 hours ,last day 03/26/22 (19 Feb 2022 13:16)  gabapentin 100 mg oral capsule: 1 cap(s) orally 3 times a day (13 Feb 2022 10:49)  melatonin 3 mg oral tablet: 1 tab(s) orally once a day (at bedtime), As needed, Insomnia (13 Feb 2022 10:49)  metoprolol succinate 25 mg oral tablet, extended release: 1 tab(s) orally once a day (19 Feb 2022 13:16)  Multiple Vitamins with Minerals oral tablet: 1 tab(s) orally once a day (19 Feb 2022 13:16)  pantoprazole 40 mg oral delayed release tablet: 1 tab(s) orally once a day (before a meal) (19 Feb 2022 13:16)  simethicone 80 mg oral tablet: 2 tab(s) orally every 6 hours, As Needed (13 Feb 2022 10:49)  spironolactone 25 mg oral tablet: 1 tab(s) orally once a day (19 Feb 2022 13:16)  Symbicort 160 mcg-4.5 mcg/inh inhalation aerosol: 2 puff(s) inhaled 2 times a day (13 Feb 2022 10:49)  Ventolin HFA 90 mcg/inh inhalation aerosol: 2 puff(s) inhaled every 6 hours, As Needed (13 Feb 2022 10:49)      MEDICATIONS  (STANDING):  aMIOdarone    Tablet 200 milliGRAM(s) Oral daily  apixaban 5 milliGRAM(s) Oral every 12 hours  aspirin enteric coated 81 milliGRAM(s) Oral daily  atorvastatin 40 milliGRAM(s) Oral at bedtime  buMETAnide 2 milliGRAM(s) Oral two times a day  ceFAZolin   IVPB 2000 milliGRAM(s) IV Intermittent every 8 hours  chlorhexidine 4% Liquid 1 Application(s) Topical <User Schedule>  gabapentin 100 milliGRAM(s) Oral three times a day  lactobacillus acidophilus 1 Tablet(s) Oral two times a day  lidocaine   4% Patch 1 Patch Transdermal daily  metoprolol succinate ER 25 milliGRAM(s) Oral daily  midodrine. 5 milliGRAM(s) Oral once  pantoprazole    Tablet 40 milliGRAM(s) Oral before breakfast  sacubitril 24 mG/valsartan 26 mG 1 Tablet(s) Oral two times a day  sodium chloride 0.45%. 1000 milliLiter(s) (50 mL/Hr) IV Continuous <Continuous>  spironolactone 25 milliGRAM(s) Oral daily    MEDICATIONS  (PRN):  acetaminophen     Tablet .. 650 milliGRAM(s) Oral every 6 hours PRN Temp greater or equal to 38C (100.4F), Mild Pain (1 - 3)  ALBUTerol    90 MICROgram(s) HFA Inhaler 2 Puff(s) Inhalation every 6 hours PRN Shortness of Breath and/or Wheezing  aluminum hydroxide/magnesium hydroxide/simethicone Suspension 30 milliLiter(s) Oral every 4 hours PRN Dyspepsia  guaiFENesin Oral Liquid (Sugar-Free) 200 milliGRAM(s) Oral every 6 hours PRN Cough  melatonin 3 milliGRAM(s) Oral at bedtime PRN Insomnia  ondansetron Injectable 4 milliGRAM(s) IV Push every 8 hours PRN Nausea and/or Vomiting  simethicone 80 milliGRAM(s) Chew three times a day PRN Indigestion  sodium chloride 0.9% lock flush 10 milliLiter(s) IV Push every 1 hour PRN Pre/post blood products, medications, blood draw, and to maintain line patency      Diet, Consistent Carbohydrate w/Evening Snack:   Low Sodium  Supplement Feeding Modality:  Oral  Glucerna Shake Cans or Servings Per Day:  1       Frequency:  Three Times a day (02-18-22 @ 15:52) [Active]          Vital Signs Last 24 Hrs  T(C): 37 (24 Feb 2022 05:12), Max: 37.4 (23 Feb 2022 19:56)  T(F): 98.6 (24 Feb 2022 05:12), Max: 99.3 (23 Feb 2022 19:56)  HR: 103 (24 Feb 2022 05:12) (94 - 103)  BP: 104/72 (24 Feb 2022 05:12) (93/65 - 118/71)  BP(mean): --  RR: 17 (24 Feb 2022 05:12) (17 - 18)  SpO2: 94% (24 Feb 2022 05:12) (90% - 94%)      02-23-22 @ 07:01  -  02-24-22 @ 07:00  --------------------------------------------------------  IN: 150 mL / OUT: 3300 mL / NET: -3150 mL              LABS:                        13.0   6.97  )-----------( 174      ( 24 Feb 2022 07:52 )             42.0     02-24    139  |  105  |  39<H>  ----------------------------<  132<H>  4.4   |  29  |  1.20    Ca    8.8      24 Feb 2022 07:52    TPro  6.0  /  Alb  2.3<L>  /  TBili  1.2  /  DBili  x   /  AST  72<H>  /  ALT  22  /  AlkPhos  552<H>  02-24              WBC:  WBC Count: 6.97 K/uL (02-24 @ 07:52)  WBC Count: 5.52 K/uL (02-22 @ 07:55)  WBC Count: 6.03 K/uL (02-22 @ 00:43)  WBC Count: 5.24 K/uL (02-21 @ 08:04)      MICROBIOLOGY:  RECENT CULTURES:  02-22 .Blood Blood XXXX XXXX   No growth to date.                    Sodium:  Sodium, Serum: 139 mmol/L (02-24 @ 07:52)  Sodium, Serum: 141 mmol/L (02-22 @ 07:55)  Sodium, Serum: 141 mmol/L (02-22 @ 00:43)  Sodium, Serum: 140 mmol/L (02-21 @ 09:32)      1.20 mg/dL 02-24 @ 07:52  1.20 mg/dL 02-22 @ 07:55  1.20 mg/dL 02-22 @ 00:43  1.40 mg/dL 02-21 @ 09:32      Hemoglobin:  Hemoglobin: 13.0 g/dL (02-24 @ 07:52)  Hemoglobin: 13.6 g/dL (02-22 @ 07:55)  Hemoglobin: 14.0 g/dL (02-22 @ 00:43)  Hemoglobin: 15.2 g/dL (02-21 @ 08:04)      Platelets: Platelet Count - Automated: 174 K/uL (02-24 @ 07:52)  Platelet Count - Automated: 154 K/uL (02-22 @ 07:55)  Platelet Count - Automated: 146 K/uL (02-22 @ 00:43)  Platelet Count - Automated: 132 K/uL (02-21 @ 08:04)      LIVER FUNCTIONS - ( 24 Feb 2022 07:52 )  Alb: 2.3 g/dL / Pro: 6.0 g/dL / ALK PHOS: 552 U/L / ALT: 22 U/L / AST: 72 U/L / GGT: x                 RADIOLOGY & ADDITIONAL STUDIES:      MICROBIOLOGY:  RECENT CULTURES:  02-22 .Blood Blood XXXX XXXX   No growth to date.            
    DOROTHY VOSS    [facility] TELE [facility] W1    Allergies    No Known Allergies    Intolerances    pork (Other)      PAST MEDICAL & SURGICAL HISTORY:  Heart failure, systolic    CAD (coronary artery disease)    Hypertension    Nonischemic cardiomyopathy    COPD, moderate    2019 novel coronavirus disease (COVID-19)    Substance abuse    HLD (hyperlipidemia)    Cardiac LV ejection fraction 10-20%    AICD (automatic cardioverter/defibrillator) present    Cardiac LV ejection fraction 10-20%        FAMILY HISTORY:  FH: lung cancer        Home Medications:  acetaminophen 325 mg oral tablet: 2 tab(s) orally every 6 hours, As needed, Temp greater or equal to 38C (100.4F), Mild Pain (1 - 3) (13 Feb 2022 10:49)  Aquaphor Healing topical ointment: Apply topically to affected area once a day (13 Feb 2022 10:49)  ascorbic acid 500 mg oral tablet: 1 tab(s) orally once a day (19 Feb 2022 13:16)  Bacid (LAC) oral capsule: 2 cap(s) orally once a day (13 Feb 2022 10:49)  bumetanide 2 mg oral tablet: 1 tab(s) orally 2 times a day (19 Feb 2022 13:16)  ceFAZolin: 2 gram(s) intravenous every 8 hours ,last day 03/26/22 (19 Feb 2022 13:16)  gabapentin 100 mg oral capsule: 1 cap(s) orally 3 times a day (13 Feb 2022 10:49)  melatonin 3 mg oral tablet: 1 tab(s) orally once a day (at bedtime), As needed, Insomnia (13 Feb 2022 10:49)  metoprolol succinate 25 mg oral tablet, extended release: 1 tab(s) orally once a day (19 Feb 2022 13:16)  Multiple Vitamins with Minerals oral tablet: 1 tab(s) orally once a day (19 Feb 2022 13:16)  pantoprazole 40 mg oral delayed release tablet: 1 tab(s) orally once a day (before a meal) (19 Feb 2022 13:16)  simethicone 80 mg oral tablet: 2 tab(s) orally every 6 hours, As Needed (13 Feb 2022 10:49)  spironolactone 25 mg oral tablet: 1 tab(s) orally once a day (19 Feb 2022 13:16)  Symbicort 160 mcg-4.5 mcg/inh inhalation aerosol: 2 puff(s) inhaled 2 times a day (13 Feb 2022 10:49)  Ventolin HFA 90 mcg/inh inhalation aerosol: 2 puff(s) inhaled every 6 hours, As Needed (13 Feb 2022 10:49)      MEDICATIONS  (STANDING):  aMIOdarone    Tablet 200 milliGRAM(s) Oral daily  apixaban 5 milliGRAM(s) Oral every 12 hours  aspirin enteric coated 81 milliGRAM(s) Oral daily  atorvastatin 40 milliGRAM(s) Oral at bedtime  buMETAnide 2 milliGRAM(s) Oral two times a day  ceFAZolin   IVPB 2000 milliGRAM(s) IV Intermittent every 8 hours  chlorhexidine 4% Liquid 1 Application(s) Topical <User Schedule>  gabapentin 100 milliGRAM(s) Oral three times a day  lactobacillus acidophilus 1 Tablet(s) Oral two times a day  lidocaine   4% Patch 1 Patch Transdermal daily  metoprolol succinate ER 25 milliGRAM(s) Oral daily  midodrine. 5 milliGRAM(s) Oral once  pantoprazole    Tablet 40 milliGRAM(s) Oral before breakfast  spironolactone 25 milliGRAM(s) Oral daily    MEDICATIONS  (PRN):  acetaminophen     Tablet .. 650 milliGRAM(s) Oral every 6 hours PRN Temp greater or equal to 38C (100.4F), Mild Pain (1 - 3)  ALBUTerol    90 MICROgram(s) HFA Inhaler 2 Puff(s) Inhalation every 6 hours PRN Shortness of Breath and/or Wheezing  aluminum hydroxide/magnesium hydroxide/simethicone Suspension 30 milliLiter(s) Oral every 4 hours PRN Dyspepsia  guaiFENesin Oral Liquid (Sugar-Free) 200 milliGRAM(s) Oral every 6 hours PRN Cough  melatonin 3 milliGRAM(s) Oral at bedtime PRN Insomnia  ondansetron Injectable 4 milliGRAM(s) IV Push every 8 hours PRN Nausea and/or Vomiting  simethicone 80 milliGRAM(s) Chew three times a day PRN Indigestion  sodium chloride 0.9% lock flush 10 milliLiter(s) IV Push every 1 hour PRN Pre/post blood products, medications, blood draw, and to maintain line patency      Diet, Consistent Carbohydrate w/Evening Snack:   Low Sodium  Supplement Feeding Modality:  Oral  Glucerna Shake Cans or Servings Per Day:  1       Frequency:  Three Times a day (02-18-22 @ 15:52) [Active]          Vital Signs Last 24 Hrs  T(C): 36.7 (22 Feb 2022 04:26), Max: 38.2 (21 Feb 2022 20:11)  T(F): 98 (22 Feb 2022 04:26), Max: 100.7 (21 Feb 2022 20:11)  HR: 78 (22 Feb 2022 05:36) (78 - 97)  BP: 100/61 (22 Feb 2022 05:36) (93/66 - 126/88)  BP(mean): --  RR: 18 (22 Feb 2022 04:26) (18 - 20)  SpO2: 94% (22 Feb 2022 04:26) (90% - 94%)      02-21-22 @ 07:01  -  02-22-22 @ 07:00  --------------------------------------------------------  IN: 0 mL / OUT: 1700 mL / NET: -1700 mL              LABS:                        13.6   5.52  )-----------( 154      ( 22 Feb 2022 07:55 )             43.7     02-22    141  |  104  |  59<H>  ----------------------------<  111<H>  4.4   |  33<H>  |  1.20    Ca    8.2<L>      22 Feb 2022 07:55    TPro  5.3<L>  /  Alb  2.1<L>  /  TBili  0.8  /  DBili  x   /  AST  53<H>  /  ALT  20  /  AlkPhos  414<H>  02-22              WBC:  WBC Count: 5.52 K/uL (02-22 @ 07:55)  WBC Count: 6.03 K/uL (02-22 @ 00:43)  WBC Count: 5.24 K/uL (02-21 @ 08:04)  WBC Count: 6.17 K/uL (02-19 @ 08:07)      MICROBIOLOGY:  RECENT CULTURES:  02-15 Clean Catch Clean Catch (Midstream) XXXX XXXX   No growth                    Sodium:  Sodium, Serum: 141 mmol/L (02-22 @ 07:55)  Sodium, Serum: 141 mmol/L (02-22 @ 00:43)  Sodium, Serum: 140 mmol/L (02-21 @ 09:32)  Sodium, Serum: 135 mmol/L (02-19 @ 09:28)      1.20 mg/dL 02-22 @ 07:55  1.20 mg/dL 02-22 @ 00:43  1.40 mg/dL 02-21 @ 09:32  1.80 mg/dL 02-19 @ 09:28      Hemoglobin:  Hemoglobin: 13.6 g/dL (02-22 @ 07:55)  Hemoglobin: 14.0 g/dL (02-22 @ 00:43)  Hemoglobin: 15.2 g/dL (02-21 @ 08:04)  Hemoglobin: 16.8 g/dL (02-19 @ 08:07)      Platelets: Platelet Count - Automated: 154 K/uL (02-22 @ 07:55)  Platelet Count - Automated: 146 K/uL (02-22 @ 00:43)  Platelet Count - Automated: 132 K/uL (02-21 @ 08:04)  Platelet Count - Automated: 189 K/uL (02-19 @ 08:07)      LIVER FUNCTIONS - ( 22 Feb 2022 00:43 )  Alb: 2.1 g/dL / Pro: 5.3 g/dL / ALK PHOS: 414 U/L / ALT: 20 U/L / AST: 53 U/L / GGT: x                 RADIOLOGY & ADDITIONAL STUDIES:      MICROBIOLOGY:  RECENT CULTURES:  02-15 Clean Catch Clean Catch (Midstream) XXXX XXXX   No growth            
  Patient is a 61y Male whom presented to the hospital with ckd and ki     PAST MEDICAL & SURGICAL HISTORY:  Heart failure, systolic    CAD (coronary artery disease)    Hypertension    Nonischemic cardiomyopathy    COPD, moderate    2019 novel coronavirus disease (COVID-19)    Substance abuse    HLD (hyperlipidemia)    Cardiac LV ejection fraction 10-20%    AICD (automatic cardioverter/defibrillator) present    Cardiac LV ejection fraction 10-20%        MEDICATIONS  (STANDING):  apixaban 5 milliGRAM(s) Oral every 12 hours  atorvastatin 40 milliGRAM(s) Oral at bedtime  buDESOnide    Inhalation Suspension 0.5 milliGRAM(s) Inhalation two times a day  buMETAnide Injectable 2 milliGRAM(s) IV Push every 12 hours  dexAMETHasone     Tablet 6 milliGRAM(s) Oral daily  gabapentin 100 milliGRAM(s) Oral three times a day  lactobacillus acidophilus 1 Tablet(s) Oral two times a day  pantoprazole    Tablet 40 milliGRAM(s) Oral before breakfast                                                                 16.8   6.17  )-----------( 189      ( 19 Feb 2022 08:07 )             52.2       CBC Full  -  ( 19 Feb 2022 08:07 )  WBC Count : 6.17 K/uL  RBC Count : 6.29 M/uL  Hemoglobin : 16.8 g/dL  Hematocrit : 52.2 %  Platelet Count - Automated : 189 K/uL  Mean Cell Volume : 83.0 fl  Mean Cell Hemoglobin : 26.7 pg  Mean Cell Hemoglobin Concentration : 32.2 gm/dL  Auto Neutrophil # : x  Auto Lymphocyte # : x  Auto Monocyte # : x  Auto Eosinophil # : x  Auto Basophil # : x  Auto Neutrophil % : x  Auto Lymphocyte % : x  Auto Monocyte % : x  Auto Eosinophil % : x  Auto Basophil % : x      02-19    135  |  96  |  95<H>  ----------------------------<  115<H>  5.7<H>   |  36<H>  |  1.80<H>    Ca    7.9<L>      19 Feb 2022 09:28        CAPILLARY BLOOD GLUCOSE          Vital Signs Last 24 Hrs  T(C): 37.3 (20 Feb 2022 11:16), Max: 37.3 (20 Feb 2022 11:16)  T(F): 99.2 (20 Feb 2022 11:16), Max: 99.2 (20 Feb 2022 11:16)  HR: 98 (20 Feb 2022 17:35) (81 - 98)  BP: 80/57 (20 Feb 2022 17:35) (79/54 - 94/58)  BP(mean): --  RR: 18 (20 Feb 2022 17:35) (17 - 18)  SpO2: 94% (20 Feb 2022 17:35) (93% - 95%)                Allergies    No Known Allergies    Intolerances    pork (Other)      SOCIAL HISTORY:  Denies ETOh,Smoking,     FAMILY HISTORY:  FH: lung cancer        REVIEW OF SYSTEMS:  unable to obtained a good review system                                PHYSICAL EXAM:    Constitutional: NAD  HEENT: conjunctive   clear   Neck:  No JVD  Respiratory: decrease bs b/l   Cardiovascular: S1 and S2  Gastrointestinal: BS+, soft,   Extremities: No peripheral edema    
  Patient is a 61y Male whom presented to the hospital with ckd and ki     PAST MEDICAL & SURGICAL HISTORY:  Heart failure, systolic    CAD (coronary artery disease)    Hypertension    Nonischemic cardiomyopathy    COPD, moderate    2019 novel coronavirus disease (COVID-19)    Substance abuse    HLD (hyperlipidemia)    Cardiac LV ejection fraction 10-20%    AICD (automatic cardioverter/defibrillator) present    Cardiac LV ejection fraction 10-20%        MEDICATIONS  (STANDING):  apixaban 5 milliGRAM(s) Oral every 12 hours  atorvastatin 40 milliGRAM(s) Oral at bedtime  buDESOnide    Inhalation Suspension 0.5 milliGRAM(s) Inhalation two times a day  buMETAnide Injectable 2 milliGRAM(s) IV Push every 12 hours  dexAMETHasone     Tablet 6 milliGRAM(s) Oral daily  gabapentin 100 milliGRAM(s) Oral three times a day  lactobacillus acidophilus 1 Tablet(s) Oral two times a day  pantoprazole    Tablet 40 milliGRAM(s) Oral before breakfast                          14.9   9.70  )-----------( 206      ( 17 Feb 2022 07:27 )             47.2       CBC Full  -  ( 17 Feb 2022 07:27 )  WBC Count : 9.70 K/uL  RBC Count : 5.47 M/uL  Hemoglobin : 14.9 g/dL  Hematocrit : 47.2 %  Platelet Count - Automated : 206 K/uL  Mean Cell Volume : 86.3 fl  Mean Cell Hemoglobin : 27.2 pg  Mean Cell Hemoglobin Concentration : 31.6 gm/dL  Auto Neutrophil # : x  Auto Lymphocyte # : x  Auto Monocyte # : x  Auto Eosinophil # : x  Auto Basophil # : x  Auto Neutrophil % : x  Auto Lymphocyte % : x  Auto Monocyte % : x  Auto Eosinophil % : x  Auto Basophil % : x      02-17    134<L>  |  97  |  68<H>  ----------------------------<  213<H>  4.7   |  29  |  1.40<H>    Ca    7.9<L>      17 Feb 2022 07:27        CAPILLARY BLOOD GLUCOSE          Vital Signs Last 24 Hrs  T(C): 36.6 (18 Feb 2022 11:51), Max: 36.6 (18 Feb 2022 11:51)  T(F): 97.9 (18 Feb 2022 11:51), Max: 97.9 (18 Feb 2022 11:51)  HR: 64 (18 Feb 2022 18:41) (64 - 71)  BP: 104/75 (18 Feb 2022 18:41) (104/75 - 114/79)  BP(mean): --  RR: 17 (18 Feb 2022 18:41) (17 - 18)  SpO2: 97% (18 Feb 2022 18:41) (95% - 97%)          Allergies    No Known Allergies    Intolerances    pork (Other)      SOCIAL HISTORY:  Denies ETOh,Smoking,     FAMILY HISTORY:  FH: lung cancer        REVIEW OF SYSTEMS:  unable to obtained a good review system                                PHYSICAL EXAM:    Constitutional: NAD  HEENT: conjunctive   clear   Neck:  No JVD  Respiratory: decrease bs b/l   Cardiovascular: S1 and S2  Gastrointestinal: BS+, soft,   Extremities: No peripheral edema    
  Patient is a 61y Male whom presented to the hospital with ckd and ki     PAST MEDICAL & SURGICAL HISTORY:  Heart failure, systolic    CAD (coronary artery disease)    Hypertension    Nonischemic cardiomyopathy    COPD, moderate    2019 novel coronavirus disease (COVID-19)    Substance abuse    HLD (hyperlipidemia)    Cardiac LV ejection fraction 10-20%    AICD (automatic cardioverter/defibrillator) present    Cardiac LV ejection fraction 10-20%        MEDICATIONS  (STANDING):  apixaban 5 milliGRAM(s) Oral every 12 hours  atorvastatin 40 milliGRAM(s) Oral at bedtime  buDESOnide    Inhalation Suspension 0.5 milliGRAM(s) Inhalation two times a day  buMETAnide Injectable 2 milliGRAM(s) IV Push every 12 hours  dexAMETHasone     Tablet 6 milliGRAM(s) Oral daily  gabapentin 100 milliGRAM(s) Oral three times a day  lactobacillus acidophilus 1 Tablet(s) Oral two times a day  pantoprazole    Tablet 40 milliGRAM(s) Oral before breakfast      Allergies    No Known Allergies    Intolerances    pork (Other)      SOCIAL HISTORY:  Denies ETOh,Smoking,     FAMILY HISTORY:  FH: lung cancer        REVIEW OF SYSTEMS:  unable to obtained a good review system                                              13.7   9.75  )-----------( 180      ( 14 Feb 2022 07:48 )             44.3       CBC Full  -  ( 14 Feb 2022 07:48 )  WBC Count : 9.75 K/uL  RBC Count : 5.08 M/uL  Hemoglobin : 13.7 g/dL  Hematocrit : 44.3 %  Platelet Count - Automated : 180 K/uL  Mean Cell Volume : 87.2 fl  Mean Cell Hemoglobin : 27.0 pg  Mean Cell Hemoglobin Concentration : 30.9 gm/dL  Auto Neutrophil # : x  Auto Lymphocyte # : x  Auto Monocyte # : x  Auto Eosinophil # : x  Auto Basophil # : x  Auto Neutrophil % : x  Auto Lymphocyte % : x  Auto Monocyte % : x  Auto Eosinophil % : x  Auto Basophil % : x      02-14    135  |  98  |  71<H>  ----------------------------<  130<H>  4.8   |  32<H>  |  1.40<H>    Ca    9.0      14 Feb 2022 07:48        CAPILLARY BLOOD GLUCOSE          Vital Signs Last 24 Hrs  T(C): 36.5 (14 Feb 2022 11:45), Max: 37.1 (13 Feb 2022 19:06)  T(F): 97.7 (14 Feb 2022 11:45), Max: 98.7 (13 Feb 2022 19:06)  HR: 61 (14 Feb 2022 17:09) (61 - 70)  BP: 116/75 (14 Feb 2022 11:45) (111/77 - 116/83)  BP(mean): --  RR: 19 (14 Feb 2022 11:45) (18 - 21)  SpO2: 98% (14 Feb 2022 17:09) (96% - 99%)          PHYSICAL EXAM:    Constitutional: NAD  HEENT: conjunctive   clear   Neck:  No JVD  Respiratory: decrease bs b/l   Cardiovascular: S1 and S2  Gastrointestinal: BS+, soft,   Extremities: No peripheral edema    
Chief Complaint: Shortness of breath    Interval Events: Febrile overnight.    Review of Systems:  General: No fevers, chills, weight gain  Skin: No rashes, color changes  Cardiovascular: No chest pain, orthopnea  Respiratory: No shortness of breath, cough  Gastrointestinal: No nausea, abdominal pain  Genitourinary: No incontinence, pain with urination  Musculoskeletal: No pain, swelling, decreased range of motion  Neurological: No headache, weakness  Psychiatric: No depression, anxiety  Endocrine: No weight gain, increased thirst  All other systems are comprehensively negative.    Physical Exam:  Vital Signs Last 24 Hrs  T(C): 36.7 (22 Feb 2022 04:26), Max: 38.2 (21 Feb 2022 20:11)  T(F): 98 (22 Feb 2022 04:26), Max: 100.7 (21 Feb 2022 20:11)  HR: 78 (22 Feb 2022 05:36) (78 - 97)  BP: 100/61 (22 Feb 2022 05:36) (93/66 - 126/88)  BP(mean): --  RR: 18 (22 Feb 2022 04:26) (18 - 20)  SpO2: 94% (22 Feb 2022 04:26) (90% - 94%)  General: NAD  HEENT: MMM  Neck: No JVD, no carotid bruit  Lungs: CTAB  CV: RRR, nl S1/S2, no M/R/G  Abdomen: S/NT/ND, +BS  Extremities: Trace LE edema, no cyanosis  Neuro: AAOx3, non-focal  Skin: No rash    Labs:    02-22    141  |  104  |  59<H>  ----------------------------<  111<H>  4.4   |  33<H>  |  1.20    Ca    8.2<L>      22 Feb 2022 07:55    TPro  5.3<L>  /  Alb  2.1<L>  /  TBili  0.8  /  DBili  x   /  AST  53<H>  /  ALT  20  /  AlkPhos  414<H>  02-22                        13.6   5.52  )-----------( 154      ( 22 Feb 2022 07:55 )             43.7       Telemetry: Sinus rhythm
Chief Complaint: Shortness of breath    Interval Events: Febrile overnight.    Review of Systems:  General: No fevers, chills, weight gain  Skin: No rashes, color changes  Cardiovascular: No chest pain, orthopnea  Respiratory: No shortness of breath, cough  Gastrointestinal: No nausea, abdominal pain  Genitourinary: No incontinence, pain with urination  Musculoskeletal: No pain, swelling, decreased range of motion  Neurological: No headache, weakness  Psychiatric: No depression, anxiety  Endocrine: No weight gain, increased thirst  All other systems are comprehensively negative.    Physical Exam:  Vital Signs Last 24 Hrs  T(C): 37.2 (23 Feb 2022 05:30), Max: 38.9 (22 Feb 2022 19:13)  T(F): 98.9 (23 Feb 2022 05:30), Max: 102.1 (22 Feb 2022 19:13)  HR: 101 (23 Feb 2022 05:30) (80 - 101)  BP: 102/66 (23 Feb 2022 05:30) (85/56 - 102/66)  BP(mean): --  RR: 20 (23 Feb 2022 05:30) (18 - 20)  SpO2: 95% (23 Feb 2022 05:30) (93% - 98%)  General: NAD  HEENT: MMM  Neck: No JVD, no carotid bruit  Lungs: CTAB  CV: RRR, nl S1/S2, no M/R/G  Abdomen: S/NT/ND, +BS  Extremities: Trace LE edema, no cyanosis  Neuro: AAOx3, non-focal  Skin: No rash    Labs:    02-22    141  |  104  |  59<H>  ----------------------------<  111<H>  4.4   |  33<H>  |  1.20    Ca    8.2<L>      22 Feb 2022 07:55    TPro  5.3<L>  /  Alb  2.1<L>  /  TBili  0.8  /  DBili  x   /  AST  53<H>  /  ALT  20  /  AlkPhos  414<H>  02-22                        13.6   5.52  )-----------( 154      ( 22 Feb 2022 07:55 )             43.7       Telemetry: Sinus rhythm
Chief Complaint: Shortness of breath    Interval Events: No events overnight.    Review of Systems:  General: No fevers, chills, weight gain  Skin: No rashes, color changes  Cardiovascular: No chest pain, orthopnea  Respiratory: No shortness of breath, cough  Gastrointestinal: No nausea, abdominal pain  Genitourinary: No incontinence, pain with urination  Musculoskeletal: No pain, swelling, decreased range of motion  Neurological: No headache, weakness  Psychiatric: No depression, anxiety  Endocrine: No weight gain, increased thirst  All other systems are comprehensively negative.    Physical Exam:  Vital Signs Last 24 Hrs  T(C): 36.3 (16 Feb 2022 07:12), Max: 36.8 (15 Feb 2022 19:54)  T(F): 97.4 (16 Feb 2022 07:12), Max: 98.2 (15 Feb 2022 19:54)  HR: 66 (16 Feb 2022 09:21) (61 - 78)  BP: 107/75 (16 Feb 2022 07:12) (103/68 - 120/86)  BP(mean): --  RR: 17 (16 Feb 2022 05:05) (17 - 18)  SpO2: 97% (16 Feb 2022 09:21) (92% - 98%)  General: NAD  HEENT: MMM  Neck: No JVD, no carotid bruit  Lungs: CTAB  CV: RRR, nl S1/S2, no M/R/G  Abdomen: S/NT/ND, +BS  Extremities: 2+ LE edema, no cyanosis  Neuro: AAOx3, non-focal  Skin: No rash    Labs:    02-16    138  |  101  |  54<H>  ----------------------------<  135<H>  4.9   |  31  |  1.10    Ca    7.8<L>      16 Feb 2022 07:27                          13.8   9.90  )-----------( 193      ( 16 Feb 2022 07:27 )             43.7       Telemetry: Sinus rhythm
Chief Complaint: Shortness of breath    Interval Events: No events overnight.    Review of Systems:  General: No fevers, chills, weight gain  Skin: No rashes, color changes  Cardiovascular: No chest pain, orthopnea  Respiratory: No shortness of breath, cough  Gastrointestinal: No nausea, abdominal pain  Genitourinary: No incontinence, pain with urination  Musculoskeletal: No pain, swelling, decreased range of motion  Neurological: No headache, weakness  Psychiatric: No depression, anxiety  Endocrine: No weight gain, increased thirst  All other systems are comprehensively negative.    Physical Exam:  Vital Signs Last 24 Hrs  T(C): 36.3 (17 Feb 2022 05:08), Max: 36.8 (16 Feb 2022 11:40)  T(F): 97.4 (17 Feb 2022 05:08), Max: 98.3 (16 Feb 2022 11:40)  HR: 67 (17 Feb 2022 05:08) (61 - 67)  BP: 108/71 (17 Feb 2022 05:08) (105/73 - 117/81)  BP(mean): --  RR: 17 (17 Feb 2022 05:08) (17 - 18)  SpO2: 97% (17 Feb 2022 05:08) (94% - 98%)  General: NAD  HEENT: MMM  Neck: No JVD, no carotid bruit  Lungs: CTAB  CV: RRR, nl S1/S2, no M/R/G  Abdomen: S/NT/ND, +BS  Extremities: 2+ LE edema, no cyanosis  Neuro: AAOx3, non-focal  Skin: No rash    Labs:    02-17    x   |  x   |  68<H>  ----------------------------<  213<H>  x    |  29  |  1.40<H>    Ca    7.9<L>      17 Feb 2022 07:27                          14.9   9.70  )-----------( 206      ( 17 Feb 2022 07:27 )             47.2       Telemetry: Sinus rhythm
Chief Complaint: Shortness of breath    Interval Events: No events overnight.    Review of Systems:  General: No fevers, chills, weight gain  Skin: No rashes, color changes  Cardiovascular: No chest pain, orthopnea  Respiratory: No shortness of breath, cough  Gastrointestinal: No nausea, abdominal pain  Genitourinary: No incontinence, pain with urination  Musculoskeletal: No pain, swelling, decreased range of motion  Neurological: No headache, weakness  Psychiatric: No depression, anxiety  Endocrine: No weight gain, increased thirst  All other systems are comprehensively negative.    Physical Exam:  Vital Signs Last 24 Hrs  T(C): 36.6 (15 Feb 2022 03:44), Max: 36.8 (14 Feb 2022 17:40)  T(F): 97.8 (15 Feb 2022 03:44), Max: 98.3 (14 Feb 2022 17:40)  HR: 63 (15 Feb 2022 03:44) (61 - 70)  BP: 109/71 (15 Feb 2022 03:44) (109/71 - 120/77)  BP(mean): --  RR: 17 (15 Feb 2022 03:44) (17 - 19)  SpO2: 93% (15 Feb 2022 03:44) (93% - 98%)  General: NAD  HEENT: MMM  Neck: No JVD, no carotid bruit  Lungs: CTAB  CV: RRR, nl S1/S2, no M/R/G  Abdomen: S/NT/ND, +BS  Extremities: 2+ LE edema, no cyanosis  Neuro: AAOx3, non-focal  Skin: No rash    Labs:    02-15    139  |  100  |  58<H>  ----------------------------<  161<H>  4.5   |  34<H>  |  1.20    Ca    8.6      15 Feb 2022 07:53                          13.3   9.76  )-----------( 162      ( 15 Feb 2022 07:53 )             42.3       Telemetry: Sinus rhythm
Chief Complaint: Shortness of breath    Interval Events: No events overnight.    Review of Systems:  General: No fevers, chills, weight gain  Skin: No rashes, color changes  Cardiovascular: No chest pain, orthopnea  Respiratory: No shortness of breath, cough  Gastrointestinal: No nausea, abdominal pain  Genitourinary: No incontinence, pain with urination  Musculoskeletal: No pain, swelling, decreased range of motion  Neurological: No headache, weakness  Psychiatric: No depression, anxiety  Endocrine: No weight gain, increased thirst  All other systems are comprehensively negative.    Physical Exam:  Vital Signs Last 24 Hrs  T(C): 36.7 (12 Feb 2022 20:16), Max: 36.7 (12 Feb 2022 11:23)  T(F): 98 (12 Feb 2022 20:16), Max: 98.1 (12 Feb 2022 11:23)  HR: 68 (13 Feb 2022 08:09) (68 - 91)  BP: 120/90 (12 Feb 2022 20:16) (120/90 - 121/88)  BP(mean): --  RR: 17 (12 Feb 2022 20:16) (17 - 25)  SpO2: 99% (13 Feb 2022 08:09) (95% - 99%)  General: NAD  HEENT: MMM  Neck: No JVD, no carotid bruit  Lungs: CTAB  CV: RRR, nl S1/S2, no M/R/G  Abdomen: S/NT/ND, +BS  Extremities: 2+ LE edema, no cyanosis  Neuro: AAOx3, non-focal  Skin: No rash    Labs:    02-13    134<L>  |  97  |  65<H>  ----------------------------<  162<H>  4.5   |  30  |  1.50<H>    Ca    8.7      13 Feb 2022 08:21  Phos  4.1     02-12  Mg     2.6     02-12    TPro  5.6<L>  /  Alb  2.3<L>  /  TBili  2.9<H>  /  DBili  x   /  AST  34  /  ALT  42  /  AlkPhos  293<H>  02-12                        12.7   11.78 )-----------( 175      ( 13 Feb 2022 08:21 )             39.4       Telemetry: Sinus rhythm
Chief Complaint: Shortness of breath    Interval Events: No events overnight.    Review of Systems:  General: No fevers, chills, weight gain  Skin: No rashes, color changes  Cardiovascular: No chest pain, orthopnea  Respiratory: No shortness of breath, cough  Gastrointestinal: No nausea, abdominal pain  Genitourinary: No incontinence, pain with urination  Musculoskeletal: No pain, swelling, decreased range of motion  Neurological: No headache, weakness  Psychiatric: No depression, anxiety  Endocrine: No weight gain, increased thirst  All other systems are comprehensively negative.    Physical Exam:  Vital Signs Last 24 Hrs  T(C): 36.7 (19 Feb 2022 04:07), Max: 36.7 (19 Feb 2022 04:07)  T(F): 98.1 (19 Feb 2022 04:07), Max: 98.1 (19 Feb 2022 04:07)  HR: 68 (19 Feb 2022 04:07) (64 - 70)  BP: 98/65 (19 Feb 2022 04:07) (98/65 - 115/74)  BP(mean): --  RR: 16 (19 Feb 2022 04:07) (16 - 18)  SpO2: 93% (19 Feb 2022 04:07) (93% - 97%)  General: NAD  HEENT: MMM  Neck: No JVD, no carotid bruit  Lungs: CTAB  CV: RRR, nl S1/S2, no M/R/G  Abdomen: S/NT/ND, +BS  Extremities: Trace LE edema, no cyanosis  Neuro: AAOx3, non-focal  Skin: No rash    Labs:                          16.8   6.17  )-----------( 189      ( 19 Feb 2022 08:07 )             52.2         Telemetry: Sinus rhythm
Chief Complaint: Shortness of breath    Interval Events: No events overnight. Sleeping.    Review of Systems:  General: No fevers, chills, weight gain  Skin: No rashes, color changes  Cardiovascular: No chest pain, orthopnea  Respiratory: No shortness of breath, cough  Gastrointestinal: No nausea, abdominal pain  Genitourinary: No incontinence, pain with urination  Musculoskeletal: No pain, swelling, decreased range of motion  Neurological: No headache, weakness  Psychiatric: No depression, anxiety  Endocrine: No weight gain, increased thirst  All other systems are comprehensively negative.    Physical Exam:  Vital Signs Last 24 Hrs  T(C): 37 (24 Feb 2022 05:12), Max: 37.4 (23 Feb 2022 19:56)  T(F): 98.6 (24 Feb 2022 05:12), Max: 99.3 (23 Feb 2022 19:56)  HR: 103 (24 Feb 2022 05:12) (94 - 103)  BP: 104/72 (24 Feb 2022 05:12) (93/65 - 118/71)  BP(mean): --  RR: 17 (24 Feb 2022 05:12) (17 - 18)  SpO2: 94% (24 Feb 2022 05:12) (90% - 94%)  General: NAD  HEENT: MMM  Neck: No JVD, no carotid bruit  Lungs: CTAB  CV: RRR, nl S1/S2, no M/R/G  Abdomen: S/NT/ND, +BS  Extremities: Trace LE edema, no cyanosis  Neuro: AAOx3, non-focal  Skin: No rash    Labs:    02-22    141  |  104  |  59<H>  ----------------------------<  111<H>  4.4   |  33<H>  |  1.20    Ca    8.2<L>      22 Feb 2022 07:55                          13.6   5.52  )-----------( 154      ( 22 Feb 2022 07:55 )             43.7     Telemetry: Sinus rhythm
Date/Time Patient Seen:  		  Referring MD:   Data Reviewed	       Patient is a 61y old  Male who presents with a chief complaint of SOB (12 Feb 2022 17:20)      Subjective/HPI     PAST MEDICAL & SURGICAL HISTORY:  Heart failure, systolic    CAD (coronary artery disease)    Hypertension    Nonischemic cardiomyopathy    COPD, moderate    2019 novel coronavirus disease (COVID-19)    Substance abuse    HLD (hyperlipidemia)    Cardiac LV ejection fraction 10-20%    No significant past surgical history    AICD (automatic cardioverter/defibrillator) present    Cardiac LV ejection fraction 10-20%          Medication list         MEDICATIONS  (STANDING):  aMIOdarone    Tablet 200 milliGRAM(s) Oral daily  apixaban 5 milliGRAM(s) Oral every 12 hours  aspirin enteric coated 81 milliGRAM(s) Oral daily  atorvastatin 40 milliGRAM(s) Oral at bedtime  buDESOnide    Inhalation Suspension 0.5 milliGRAM(s) Inhalation two times a day  buMETAnide Injectable 2 milliGRAM(s) IV Push every 12 hours  ceFAZolin   IVPB      ceFAZolin   IVPB 1000 milliGRAM(s) IV Intermittent every 8 hours  dexAMETHasone     Tablet 6 milliGRAM(s) Oral daily  gabapentin 100 milliGRAM(s) Oral three times a day  lactobacillus acidophilus 1 Tablet(s) Oral two times a day  metoprolol succinate ER 25 milliGRAM(s) Oral daily  pantoprazole    Tablet 40 milliGRAM(s) Oral before breakfast    MEDICATIONS  (PRN):  acetaminophen     Tablet .. 650 milliGRAM(s) Oral every 6 hours PRN Temp greater or equal to 38C (100.4F), Mild Pain (1 - 3)  aluminum hydroxide/magnesium hydroxide/simethicone Suspension 30 milliLiter(s) Oral every 4 hours PRN Dyspepsia  melatonin 3 milliGRAM(s) Oral at bedtime PRN Insomnia  ondansetron Injectable 4 milliGRAM(s) IV Push every 8 hours PRN Nausea and/or Vomiting  simethicone 80 milliGRAM(s) Chew three times a day PRN Indigestion         Vitals log        ICU Vital Signs Last 24 Hrs  T(C): 36.7 (12 Feb 2022 20:16), Max: 36.7 (12 Feb 2022 11:23)  T(F): 98 (12 Feb 2022 20:16), Max: 98.1 (12 Feb 2022 11:23)  HR: 74 (12 Feb 2022 20:38) (70 - 91)  BP: 120/90 (12 Feb 2022 20:16) (120/90 - 121/88)  BP(mean): --  ABP: --  ABP(mean): --  RR: 17 (12 Feb 2022 20:16) (17 - 25)  SpO2: 99% (13 Feb 2022 06:00) (95% - 99%)           Input and Output:  I&O's Detail    12 Feb 2022 07:01  -  13 Feb 2022 07:00  --------------------------------------------------------  IN:    IV PiggyBack: 50 mL  Total IN: 50 mL    OUT:    Voided (mL): 1200 mL  Total OUT: 1200 mL    Total NET: -1150 mL          Lab Data                        13.6   15.77 )-----------( 173      ( 12 Feb 2022 05:48 )             41.2     02-12    133<L>  |  99  |  58<H>  ----------------------------<  116<H>  5.0   |  28  |  1.60<H>    Ca    8.4<L>      12 Feb 2022 05:48  Phos  4.1     02-12  Mg     2.6     02-12    TPro  5.6<L>  /  Alb  2.3<L>  /  TBili  2.9<H>  /  DBili  x   /  AST  34  /  ALT  42  /  AlkPhos  293<H>  02-12    ABG - ( 11 Feb 2022 20:45 )  pH, Arterial: 7.46  pH, Blood: x     /  pCO2: 34    /  pO2: 78    / HCO3: 24    / Base Excess: 0.4   /  SaO2: 97.3                    Review of Systems	      Objective     Physical Examination  heart s1s2  lung dec BS  abd soft        Pertinent Lab findings & Imaging      Leo:  NO   Adequate UO     I&O's Detail    12 Feb 2022 07:01  -  13 Feb 2022 07:00  --------------------------------------------------------  IN:    IV PiggyBack: 50 mL  Total IN: 50 mL    OUT:    Voided (mL): 1200 mL  Total OUT: 1200 mL    Total NET: -1150 mL               Discussed with:     Cultures:	  Culture Results:   Growth in aerobic bottle: Gram Positive Cocci in Clusters (02-12 @ 12:06)  Culture Results:   Growth in aerobic bottle: Gram Positive Cocci in Clusters (02-12 @ 12:06)        Radiology                            
Date/Time Patient Seen:  		  Referring MD:   Data Reviewed	       Patient is a 61y old  Male who presents with a chief complaint of SOB (18 Feb 2022 14:56)      Subjective/HPI     PAST MEDICAL & SURGICAL HISTORY:  Heart failure, systolic    CAD (coronary artery disease)    Hypertension    Nonischemic cardiomyopathy    COPD, moderate    2019 novel coronavirus disease (COVID-19)    Substance abuse    HLD (hyperlipidemia)    Cardiac LV ejection fraction 10-20%    No significant past surgical history    AICD (automatic cardioverter/defibrillator) present    Cardiac LV ejection fraction 10-20%          Medication list         MEDICATIONS  (STANDING):  aMIOdarone    Tablet 200 milliGRAM(s) Oral daily  apixaban 5 milliGRAM(s) Oral every 12 hours  aspirin enteric coated 81 milliGRAM(s) Oral daily  atorvastatin 40 milliGRAM(s) Oral at bedtime  buMETAnide 2 milliGRAM(s) Oral two times a day  ceFAZolin   IVPB 2000 milliGRAM(s) IV Intermittent every 8 hours  chlorhexidine 4% Liquid 1 Application(s) Topical <User Schedule>  gabapentin 100 milliGRAM(s) Oral three times a day  lactobacillus acidophilus 1 Tablet(s) Oral two times a day  lidocaine   4% Patch 1 Patch Transdermal daily  metoprolol succinate ER 25 milliGRAM(s) Oral daily  pantoprazole    Tablet 40 milliGRAM(s) Oral before breakfast  sacubitril 24 mG/valsartan 26 mG 1 Tablet(s) Oral two times a day  spironolactone 25 milliGRAM(s) Oral daily    MEDICATIONS  (PRN):  acetaminophen     Tablet .. 650 milliGRAM(s) Oral every 6 hours PRN Temp greater or equal to 38C (100.4F), Mild Pain (1 - 3)  ALBUTerol    90 MICROgram(s) HFA Inhaler 2 Puff(s) Inhalation every 6 hours PRN Shortness of Breath and/or Wheezing  aluminum hydroxide/magnesium hydroxide/simethicone Suspension 30 milliLiter(s) Oral every 4 hours PRN Dyspepsia  guaiFENesin Oral Liquid (Sugar-Free) 200 milliGRAM(s) Oral every 6 hours PRN Cough  melatonin 3 milliGRAM(s) Oral at bedtime PRN Insomnia  ondansetron Injectable 4 milliGRAM(s) IV Push every 8 hours PRN Nausea and/or Vomiting  simethicone 80 milliGRAM(s) Chew three times a day PRN Indigestion  sodium chloride 0.9% lock flush 10 milliLiter(s) IV Push every 1 hour PRN Pre/post blood products, medications, blood draw, and to maintain line patency         Vitals log        ICU Vital Signs Last 24 Hrs  T(C): 36.7 (19 Feb 2022 04:07), Max: 36.7 (19 Feb 2022 04:07)  T(F): 98.1 (19 Feb 2022 04:07), Max: 98.1 (19 Feb 2022 04:07)  HR: 68 (19 Feb 2022 04:07) (64 - 70)  BP: 98/65 (19 Feb 2022 04:07) (98/65 - 115/74)  BP(mean): --  ABP: --  ABP(mean): --  RR: 16 (19 Feb 2022 04:07) (16 - 18)  SpO2: 93% (19 Feb 2022 04:07) (93% - 97%)           Input and Output:  I&O's Detail    17 Feb 2022 07:01  -  18 Feb 2022 07:00  --------------------------------------------------------  IN:  Total IN: 0 mL    OUT:    Incontinent per Condom Catheter (mL): 850 mL  Total OUT: 850 mL    Total NET: -850 mL      18 Feb 2022 07:01  -  19 Feb 2022 06:30  --------------------------------------------------------  IN:  Total IN: 0 mL    OUT:    Voided (mL): 600 mL  Total OUT: 600 mL    Total NET: -600 mL          Lab Data                        14.9   9.70  )-----------( 206      ( 17 Feb 2022 07:27 )             47.2     02-17    134<L>  |  97  |  68<H>  ----------------------------<  213<H>  4.7   |  29  |  1.40<H>    Ca    7.9<L>      17 Feb 2022 07:27      ABG - ( 17 Feb 2022 10:03 )  pH, Arterial: 7.42  pH, Blood: x     /  pCO2: 53    /  pO2: 85    / HCO3: 34    / Base Excess: 9.9   /  SaO2: 97.8                    Review of Systems	      Objective     Physical Examination    heart s1s2  lung dec BS  abd soft      Pertinent Lab findings & Imaging      Leo:  NO   Adequate UO     I&O's Detail    17 Feb 2022 07:01  -  18 Feb 2022 07:00  --------------------------------------------------------  IN:  Total IN: 0 mL    OUT:    Incontinent per Condom Catheter (mL): 850 mL  Total OUT: 850 mL    Total NET: -850 mL      18 Feb 2022 07:01  -  19 Feb 2022 06:30  --------------------------------------------------------  IN:  Total IN: 0 mL    OUT:    Voided (mL): 600 mL  Total OUT: 600 mL    Total NET: -600 mL               Discussed with:     Cultures:	        Radiology                            
[facility], Division of Infectious Diseases  MARINA Mccray Y. Patel, S. Shah, G. [facility]  [phone]    Name: DOROTHY VOSS  Age: 61y  Gender: Male  MRN: [id number]    Interval History:  Patient seen and examined at bedside this morning  Febrile 100.7F this AM  Notes reviewed    Antibiotics:  ceFAZolin   IVPB 2000 milliGRAM(s) IV Intermittent every 8 hours      Medications:  acetaminophen     Tablet .. 650 milliGRAM(s) Oral every 6 hours PRN  ALBUTerol    90 MICROgram(s) HFA Inhaler 2 Puff(s) Inhalation every 6 hours PRN  aluminum hydroxide/magnesium hydroxide/simethicone Suspension 30 milliLiter(s) Oral every 4 hours PRN  aMIOdarone    Tablet 200 milliGRAM(s) Oral daily  apixaban 5 milliGRAM(s) Oral every 12 hours  aspirin enteric coated 81 milliGRAM(s) Oral daily  atorvastatin 40 milliGRAM(s) Oral at bedtime  buMETAnide 2 milliGRAM(s) Oral two times a day  ceFAZolin   IVPB 2000 milliGRAM(s) IV Intermittent every 8 hours  chlorhexidine 4% Liquid 1 Application(s) Topical <User Schedule>  gabapentin 100 milliGRAM(s) Oral three times a day  guaiFENesin Oral Liquid (Sugar-Free) 200 milliGRAM(s) Oral every 6 hours PRN  lactobacillus acidophilus 1 Tablet(s) Oral two times a day  lidocaine   4% Patch 1 Patch Transdermal daily  melatonin 3 milliGRAM(s) Oral at bedtime PRN  metoprolol succinate ER 25 milliGRAM(s) Oral daily  midodrine. 5 milliGRAM(s) Oral once  ondansetron Injectable 4 milliGRAM(s) IV Push every 8 hours PRN  pantoprazole    Tablet 40 milliGRAM(s) Oral before breakfast  simethicone 80 milliGRAM(s) Chew three times a day PRN  sodium chloride 0.9% lock flush 10 milliLiter(s) IV Push every 1 hour PRN  spironolactone 25 milliGRAM(s) Oral daily      Review of Systems:  A 10-point review of systems was obtained.     Pertinent positives and negatives--  Constitutional: No fevers. No Chills. No Rigors.   Cardiovascular: No chest pain. No palpitations.  Respiratory: No shortness of breath. No cough.  Gastrointestinal: No nausea or vomiting. No diarrhea or constipation.   Psychiatric: Pleasant. Appropriate affect.    Review of systems otherwise negative except as previously noted.    Allergies: No Known Allergies    For details regarding the patient's past medical history, social history, family history, and other miscellaneous elements, please refer the initial infectious diseases consultation and/or the admitting history and physical examination for this admission.    Objective:  Vitals:   T(C): 36.8 (02-22-22 @ 12:01), Max: 38.2 (02-21-22 @ 20:11)  HR: 80 (02-22-22 @ 12:01) (78 - 97)  BP: 97/64 (02-22-22 @ 12:01) (95/- - 126/88)  RR: 18 (02-22-22 @ 12:01) (18 - 20)  SpO2: 98% (02-22-22 @ 12:01) (90% - 98%)    Physical Examination:  General: no acute distress  HEENT: NC/AT, EOMI, anicteric  Cardio: S1, S2 heard, RRR, no murmurs  Resp: breath sounds heard bilaterally, no rales, wheezes or rhonchi  Abd: soft, NT, ND  Ext: no edema or cyanosis        Laboratory Studies:  CBC:                       13.6   5.52  )-----------( 154      ( 22 Feb 2022 07:55 )             43.7     CMP: 02-22    141  |  104  |  59<H>  ----------------------------<  111<H>  4.4   |  33<H>  |  1.20    Ca    8.2<L>      22 Feb 2022 07:55    TPro  5.3<L>  /  Alb  2.1<L>  /  TBili  0.8  /  DBili  x   /  AST  53<H>  /  ALT  20  /  AlkPhos  414<H>  02-22    LIVER FUNCTIONS - ( 22 Feb 2022 00:43 )  Alb: 2.1 g/dL / Pro: 5.3 g/dL / ALK PHOS: 414 U/L / ALT: 20 U/L / AST: 53 U/L / GGT: x               Microbiology: reviewed    Culture - Urine (collected 02-15-22 @ 16:24)  Source: Clean Catch Clean Catch (Midstream)  Final Report (02-16-22 @ 13:24):    No growth    Culture - Blood (collected 02-14-22 @ 12:17)  Source: .Blood Blood-Peripheral  Final Report (02-19-22 @ 13:00):    No Growth Final    Culture - Blood (collected 02-12-22 @ 12:06)  Source: .Blood Blood-Peripheral  Gram Stain (02-13-22 @ 08:12):    Growth in aerobic bottle: Gram Positive Cocci in Clusters    Growth in anaerobic bottle: Gram Positive Cocci in Clusters  Final Report (02-14-22 @ 07:16):    Growth in aerobic and anaerobic bottles: Staphylococcus aureus    See previous culture [id number]    Culture - Blood (collected 02-12-22 @ 12:06)  Source: .Blood Blood-Peripheral  Gram Stain (02-13-22 @ 08:15):    Growth in aerobic bottle: Gram Positive Cocci in Clusters    Growth in anaerobic bottle: Gram Positive Cocci in Clusters  Final Report (02-14-22 @ 07:15):    Growth in aerobic and anaerobic bottles: Staphylococcus aureus    See previous culture [id number]    Culture - Blood (collected 02-11-22 @ 16:19)  Source: .Blood Blood-Peripheral  Gram Stain (02-12-22 @ 07:00):    Growth in aerobic bottle: Gram Positive Cocci in Clusters    Growth in anaerobic bottle: Gram Positive Cocci in Clusters  Final Report (02-14-22 @ 07:13):    Growth in aerobic and anaerobic bottles: Staphylococcus aureus    ***Blood Panel PCR results on this specimen are available    approximately 3 hours after the Gram stain result.***    Gram stain, PCR, and/or culture results may not always    correspond dueto difference in methodologies.    ************************************************************    This PCR assay was performed by multiplex PCR. This    Assay tests for 66 bacterial and resistance gene targets.    Please refer to the [facility] test directory    at [email or web address] for details.  Organism: Blood Culture PCR  Staphylococcus aureus (02-14-22 @ 07:13)  Organism: Staphylococcus aureus (02-14-22 @ 07:13)      -  Ampicillin/Sulbactam: S <=8/4      -  Cefazolin: S <=4      -  Clindamycin: R <=0.25 This isolate is presumed to be clindamycin resistant based on detection of inducible resistance. Clindamycin may still be effective in some patients.      -  Erythromycin: R >4      -  Gentamicin: S <=1 Should not be used as monotherapy      -  Oxacillin: S 0.5      -  Penicillin: R 4      -  Rifampin: S <=1 Should not be used as monotherapy      -  Tetra/Doxy: S <=1      -  Trimethoprim/Sulfamethoxazole: S <=0.5/9.5      -  Vancomycin: S 1      Method Type: BAY  Organism: Blood Culture PCR (02-14-22 @ 07:13)      -  Staphylococcus aureus: Detec Any isolate of Staphylococcus aureus from a blood culture is NOT considered a contaminant.      Method Type: PCR    Culture - Blood (collected 02-11-22 @ 16:19)  Source: .Blood Blood-Peripheral  Gram Stain (02-12-22 @ 06:59):    Growth in aerobic bottle: Gram Positive Cocci in Clusters    Growth in anaerobic bottle: Gram Positive Cocci in Clusters  Final Report (02-14-22 @ 07:13):    Growth in aerobic and anaerobic bottles: Staphylococcus aureus    See previous culture [id number]        Radiology: reviewed      
[facility], Division of Infectious Diseases  MARINA Mccray Y. Patel, S. Shah, G. [facility]  [phone]    Name: DOROTHY VOSS  Age: 61y  Gender: Male  MRN: [id number]    Interval History:  Patient seen and examined at bedside this morning  No acute overnight events. Afebrile  Crying this AM, does not want to go to rehab  Notes reviewed    Antibiotics:  ceFAZolin   IVPB 2000 milliGRAM(s) IV Intermittent every 8 hours      Medications:  acetaminophen     Tablet .. 650 milliGRAM(s) Oral every 6 hours PRN  ALBUTerol    90 MICROgram(s) HFA Inhaler 2 Puff(s) Inhalation every 6 hours PRN  aluminum hydroxide/magnesium hydroxide/simethicone Suspension 30 milliLiter(s) Oral every 4 hours PRN  aMIOdarone    Tablet 200 milliGRAM(s) Oral daily  apixaban 5 milliGRAM(s) Oral every 12 hours  aspirin enteric coated 81 milliGRAM(s) Oral daily  atorvastatin 40 milliGRAM(s) Oral at bedtime  buMETAnide 2 milliGRAM(s) Oral two times a day  ceFAZolin   IVPB 2000 milliGRAM(s) IV Intermittent every 8 hours  chlorhexidine 4% Liquid 1 Application(s) Topical <User Schedule>  gabapentin 100 milliGRAM(s) Oral three times a day  guaiFENesin Oral Liquid (Sugar-Free) 200 milliGRAM(s) Oral every 6 hours PRN  lactobacillus acidophilus 1 Tablet(s) Oral two times a day  lidocaine   4% Patch 1 Patch Transdermal daily  melatonin 3 milliGRAM(s) Oral at bedtime PRN  metoprolol succinate ER 25 milliGRAM(s) Oral daily  ondansetron Injectable 4 milliGRAM(s) IV Push every 8 hours PRN  pantoprazole    Tablet 40 milliGRAM(s) Oral before breakfast  sacubitril 24 mG/valsartan 26 mG 1 Tablet(s) Oral two times a day  simethicone 80 milliGRAM(s) Chew three times a day PRN  sodium chloride 0.9% lock flush 10 milliLiter(s) IV Push every 1 hour PRN  spironolactone 25 milliGRAM(s) Oral daily      Review of Systems:    Review of systems otherwise negative except as previously noted.    Allergies: No Known Allergies    For details regarding the patient's past medical history, social history, family history, and other miscellaneous elements, please refer the initial infectious diseases consultation and/or the admitting history and physical examination for this admission.    Objective:  Vitals:   T(C): 36.7 (02-19-22 @ 04:07), Max: 36.7 (02-19-22 @ 04:07)  HR: 68 (02-19-22 @ 04:07) (64 - 70)  BP: 98/65 (02-19-22 @ 04:07) (98/65 - 115/74)  RR: 16 (02-19-22 @ 04:07) (16 - 18)  SpO2: 93% (02-19-22 @ 04:07) (93% - 97%)    Physical Examination:  General: no acute distress, crying   HEENT: NC/AT, EOMI  Cardio: S1, S2 heard, RRR, no murmurs  Resp: decreased b/l breath sounds  Abd: soft, NT, ND, + bowel sounds  Ext: no edema or cyanosis  Skin: warm, dry, no visible rash      Laboratory Studies:  CBC:                       16.8   6.17  )-----------( 189      ( 19 Feb 2022 08:07 )             52.2     CMP: 02-19    135  |  96  |  95<H>  ----------------------------<  115<H>  5.7<H>   |  36<H>  |  1.80<H>    Ca    7.9<L>      19 Feb 2022 09:28            Microbiology: reviewed    Culture - Urine (collected 02-15-22 @ 16:24)  Source: Clean Catch Clean Catch (Midstream)  Final Report (02-16-22 @ 13:24):    No growth    Culture - Blood (collected 02-14-22 @ 12:17)  Source: .Blood Blood-Peripheral  Preliminary Report (02-15-22 @ 13:02):    No growth to date.    Culture - Blood (collected 02-12-22 @ 12:06)  Source: .Blood Blood-Peripheral  Gram Stain (02-13-22 @ 08:12):    Growth in aerobic bottle: Gram Positive Cocci in Clusters    Growth in anaerobic bottle: Gram Positive Cocci in Clusters  Final Report (02-14-22 @ 07:16):    Growth in aerobic and anaerobic bottles: Staphylococcus aureus    See previous culture [id number]    Culture - Blood (collected 02-12-22 @ 12:06)  Source: .Blood Blood-Peripheral  Gram Stain (02-13-22 @ 08:15):    Growth in aerobic bottle: Gram Positive Cocci in Clusters    Growth in anaerobic bottle: Gram Positive Cocci in Clusters  Final Report (02-14-22 @ 07:15):    Growth in aerobic and anaerobic bottles: Staphylococcus aureus    See previous culture [id number]    Culture - Blood (collected 02-11-22 @ 16:19)  Source: .Blood Blood-Peripheral  Gram Stain (02-12-22 @ 07:00):    Growth in aerobic bottle: Gram Positive Cocci in Clusters    Growth in anaerobic bottle: Gram Positive Cocci in Clusters  Final Report (02-14-22 @ 07:13):    Growth in aerobic and anaerobic bottles: Staphylococcus aureus    ***Blood Panel PCR results on this specimen are available    approximately 3 hours after the Gram stain result.***    Gram stain, PCR, and/or culture results may not always    correspond dueto difference in methodologies.    ************************************************************    This PCR assay was performed by multiplex PCR. This    Assay tests for 66 bacterial and resistance gene targets.    Please refer to the [facility] Labs test directory    at [email or web address] for details.  Organism: Blood Culture PCR  Staphylococcus aureus (02-14-22 @ 07:13)  Organism: Staphylococcus aureus (02-14-22 @ 07:13)      -  Ampicillin/Sulbactam: S <=8/4      -  Cefazolin: S <=4      -  Clindamycin: R <=0.25 This isolate is presumed to be clindamycin resistant based on detection of inducible resistance. Clindamycin may still be effective in some patients.      -  Erythromycin: R >4      -  Gentamicin: S <=1 Should not be used as monotherapy      -  Oxacillin: S 0.5      -  Penicillin: R 4      -  Rifampin: S <=1 Should not be used as monotherapy      -  Tetra/Doxy: S <=1      -  Trimethoprim/Sulfamethoxazole: S <=0.5/9.5      -  Vancomycin: S 1      Method Type: BAY  Organism: Blood Culture PCR (02-14-22 @ 07:13)      -  Staphylococcus aureus: Detec Any isolate of Staphylococcus aureus from a blood culture is NOT considered a contaminant.      Method Type: PCR    Culture - Blood (collected 02-11-22 @ 16:19)  Source: .Blood Blood-Peripheral  Gram Stain (02-12-22 @ 06:59):    Growth in aerobic bottle: Gram Positive Cocci in Clusters    Growth in anaerobic bottle: Gram Positive Cocci in Clusters  Final Report (02-14-22 @ 07:13):    Growth in aerobic and anaerobic bottles: Staphylococcus aureus    See previous culture [id number]        Radiology: reviewed      
[facility], Division of Infectious Diseases  MARINA Mccray Y. Patel, S. Shah, G. [place]  [phone]  ([phone] - weekdays after 5pm and weekends)    Name: DOROTHY VOSS  Age/Gender: 61y Male  MRN: [id number]    Interval History:  Patient seen this morning.  Complains of back pain  Denies fever or any new complaints  Notes reviewed  No concerning overnight events  Afebrile     Allergies: No Known Allergies    Objective:  Vitals:   T(F): 98.6 (02-24-22 @ 05:12), Max: 99.3 (02-23-22 @ 19:56)  HR: 103 (02-24-22 @ 05:12) (94 - 103)  BP: 104/72 (02-24-22 @ 05:12) (93/65 - 118/71)  RR: 17 (02-24-22 @ 05:12) (17 - 18)  SpO2: 94% (02-24-22 @ 05:12) (90% - 94%)  Physical Examination:  General: no acute distress, nontoxic appearing  HEENT: NC/AT, anicteric, neck supple  Respiratory: no acc muscle use, breathing comfortably  Cardiovascular: S1 and S2 present  Gastrointestinal: normal appearing, nondistended  Extremities: no edema, no cyanosis  Skin: no visible rash  Lines: RUE PICC line     Laboratory Studies:  CBC:                       13.0   6.97  )-----------( 174      ( 24 Feb 2022 07:52 )             42.0     WBC Trend:  6.97 02-24-22 @ 07:52  5.52 02-22-22 @ 07:55  6.03 02-22-22 @ 00:43  5.24 02-21-22 @ 08:04  6.17 02-19-22 @ 08:07    CMP: 02-24    139  |  105  |  39<H>  ----------------------------<  132<H>  4.4   |  29  |  1.20    Ca    8.8      24 Feb 2022 07:52    TPro  6.0  /  Alb  2.3<L>  /  TBili  1.2  /  DBili  x   /  AST  72<H>  /  ALT  22  /  AlkPhos  552<H>  02-24    LIVER FUNCTIONS - ( 24 Feb 2022 07:52 )  Alb: 2.3 g/dL / Pro: 6.0 g/dL / ALK PHOS: 552 U/L / ALT: 22 U/L / AST: 72 U/L / GGT: x           Microbiology: reviewed   Culture - Blood (collected 02-22-22 @ 06:35)  Source: .Blood Blood  Preliminary Report (02-23-22 @ 07:01):    No growth to date.    Culture - Blood (collected 02-22-22 @ 06:35)  Source: .Blood Blood  Preliminary Report (02-23-22 @ 07:01):    No growth to date.    Culture - Urine (collected 02-15-22 @ 16:24)  Source: Clean Catch Clean Catch (Midstream)  Final Report (02-16-22 @ 13:24):    No growth    Culture - Blood (collected 02-14-22 @ 12:17)  Source: .Blood Blood-Peripheral  Final Report (02-19-22 @ 13:00):    No Growth Final    Culture - Blood (collected 02-12-22 @ 12:06)  Source: .Blood Blood-Peripheral  Gram Stain (02-13-22 @ 08:12):    Growth in aerobic bottle: Gram Positive Cocci in Clusters    Growth in anaerobic bottle: Gram Positive Cocci in Clusters  Final Report (02-14-22 @ 07:16):    Growth in aerobic and anaerobic bottles: Staphylococcus aureus    See previous culture [id number]    Culture - Blood (collected 02-12-22 @ 12:06)  Source: .Blood Blood-Peripheral  Gram Stain (02-13-22 @ 08:15):    Growth in aerobic bottle: Gram Positive Cocci in Clusters    Growth in anaerobic bottle: Gram Positive Cocci in Clusters  Final Report (02-14-22 @ 07:15):    Growth in aerobic and anaerobic bottles: Staphylococcus aureus    See previous culture [id number]    Culture - Blood (collected 02-11-22 @ 16:19)  Source: .Blood Blood-Peripheral  Gram Stain (02-12-22 @ 07:00):    Growth in aerobic bottle: Gram Positive Cocci in Clusters    Growth in anaerobic bottle: Gram Positive Cocci in Clusters  Final Report (02-14-22 @ 07:13):    Growth in aerobic and anaerobic bottles: Staphylococcus aureus    ***Blood Panel PCR results on this specimen are available    approximately 3 hours after the Gram stain result.***    Gram stain, PCR, and/or culture results may not always    correspond dueto difference in methodologies.    ************************************************************    This PCR assay was performed by multiplex PCR. This    Assay tests for 66 bacterial and resistance gene targets.    Please refer to the [facility] Labs test directory    at [email or web address] for details.  Organism: Blood Culture PCR  Staphylococcus aureus (02-14-22 @ 07:13)  Organism: Staphylococcus aureus (02-14-22 @ 07:13)      -  Ampicillin/Sulbactam: S <=8/4      -  Cefazolin: S <=4      -  Clindamycin: R <=0.25 This isolate is presumed to be clindamycin resistant based on detection of inducible resistance. Clindamycin may still be effective in some patients.      -  Erythromycin: R >4      -  Gentamicin: S <=1 Should not be used as monotherapy      -  Oxacillin: S 0.5      -  Penicillin: R 4      -  Rifampin: S <=1 Should not be used as monotherapy      -  Tetra/Doxy: S <=1      -  Trimethoprim/Sulfamethoxazole: S <=0.5/9.5      -  Vancomycin: S 1      Method Type: BAY  Organism: Blood Culture PCR (02-14-22 @ 07:13)      -  Staphylococcus aureus: Detec Any isolate of Staphylococcus aureus from a blood culture is NOT considered a contaminant.      Method Type: PCR    Culture - Blood (collected 02-11-22 @ 16:19)  Source: .Blood Blood-Peripheral  Gram Stain (02-12-22 @ 06:59):    Growth in aerobic bottle: Gram Positive Cocci in Clusters    Growth in anaerobic bottle: Gram Positive Cocci in Clusters  Final Report (02-14-22 @ 07:13):    Growth in aerobic and anaerobic bottles: Staphylococcus aureus    See previous culture [id number]    Radiology: reviewed     Medications:  acetaminophen     Tablet .. 650 milliGRAM(s) Oral every 6 hours PRN  ALBUTerol    90 MICROgram(s) HFA Inhaler 2 Puff(s) Inhalation every 6 hours PRN  aluminum hydroxide/magnesium hydroxide/simethicone Suspension 30 milliLiter(s) Oral every 4 hours PRN  aMIOdarone    Tablet 200 milliGRAM(s) Oral daily  apixaban 5 milliGRAM(s) Oral every 12 hours  aspirin enteric coated 81 milliGRAM(s) Oral daily  atorvastatin 40 milliGRAM(s) Oral at bedtime  buMETAnide 2 milliGRAM(s) Oral two times a day  ceFAZolin   IVPB 2000 milliGRAM(s) IV Intermittent every 8 hours  chlorhexidine 4% Liquid 1 Application(s) Topical <User Schedule>  gabapentin 100 milliGRAM(s) Oral three times a day  guaiFENesin Oral Liquid (Sugar-Free) 200 milliGRAM(s) Oral every 6 hours PRN  lactobacillus acidophilus 1 Tablet(s) Oral two times a day  lidocaine   4% Patch 1 Patch Transdermal daily  melatonin 3 milliGRAM(s) Oral at bedtime PRN  metoprolol succinate ER 25 milliGRAM(s) Oral daily  midodrine. 5 milliGRAM(s) Oral once  ondansetron Injectable 4 milliGRAM(s) IV Push every 8 hours PRN  pantoprazole    Tablet 40 milliGRAM(s) Oral before breakfast  sacubitril 24 mG/valsartan 26 mG 1 Tablet(s) Oral two times a day  simethicone 80 milliGRAM(s) Chew three times a day PRN  sodium chloride 0.45%. 1000 milliLiter(s) IV Continuous <Continuous>  sodium chloride 0.9% lock flush 10 milliLiter(s) IV Push every 1 hour PRN  spironolactone 25 milliGRAM(s) Oral daily    Antimicrobials:  ceFAZolin   IVPB 2000 milliGRAM(s) IV Intermittent every 8 hours  
Patient is a 61y Male with a known history of :  Acute respiratory failure with hypoxia [J96.01]    2019 novel coronavirus disease (COVID-19) [U07.1]    Chronic systolic congestive heart failure [I50.22]    CHF with cardiomyopathy [I50.9]    DERRICK (acute kidney injury) [N17.9]    Hyponatremia [E87.1]    Prophylactic measure [Z29.9]    Bacteremia [R78.81]    Sepsis [A41.9]    Low grade fever [R50.9]    Acute febrile illness [R50.9]      HPI:  61 yr old male ,known to me from [facility]  with med hx of  CAD, dilated cardiomyopathy, s/p ICD, atrial flutter on Eliquis, substance abuse was admitted initially from 12/24 for decompensated CHF, was on Milrinone and Bumex drips, left AMA on 12/31 and returned to the ED later in the day as he had to take care of personal matter. He reported shortness of breath and right arm and foot pain on presentation. Cardiology and Heart Failure specialist were consulted, he was placed on oral Bumex., subsequently transitioned to Bumex gtt. Bumex drip stopped on 1/12. Stopped  Milrinone 1/14 transitioned to oral Torsemide , metoprolol . Counseling given re diet and medication compliance .Not candidate for advanced therapies due to smoking/cocaine use history and non-compliance. Patient reported he was recently evicted from his apartment and is homeless, social work consult placed. Seen by PT and recommend  [facility] ([place]). The patient was medically stable for discharge.  Subsequently pt developed COVID-19 infection was admitted to [facility] and discharged a few days ago, now returns to ER from rehab  because of increasing sob and fevers .Found to have positive COVID test ,seen by ID & pulm consult requested Palliative care consult requested ,to discuss advance directives and complete [facility]  (11 Feb 2022 13:41)      REVIEW OF SYSTEMS:    CONSTITUTIONAL: No fever, weight loss, or fatigue  EYES: No eye pain, visual disturbances, or discharge  ENMT:  No difficulty hearing, tinnitus, vertigo; No sinus or throat pain  NECK: No pain or stiffness  BREASTS: No pain, masses, or nipple discharge  RESPIRATORY: No cough, wheezing, chills or hemoptysis; No shortness of breath  CARDIOVASCULAR: No chest pain, palpitations, dizziness, or leg swelling  GASTROINTESTINAL: No abdominal or epigastric pain. No nausea, vomiting, or hematemesis; No diarrhea or constipation. No melena or hematochezia.  GENITOURINARY: No dysuria, frequency, hematuria, or incontinence  NEUROLOGICAL: No headaches, memory loss, loss of strength, numbness, or tremors  SKIN: No itching, burning, rashes, or lesions   LYMPH NODES: No enlarged glands  ENDOCRINE: No heat or cold intolerance; No hair loss  MUSCULOSKELETAL: No joint pain or swelling; No muscle, back, or extremity pain  PSYCHIATRIC: No depression, anxiety, mood swings, or difficulty sleeping  HEME/LYMPH: No easy bruising, or bleeding gums  ALLERGY AND IMMUNOLOGIC: No hives or eczema    MEDICATIONS  (STANDING):  aMIOdarone    Tablet 200 milliGRAM(s) Oral daily  apixaban 5 milliGRAM(s) Oral every 12 hours  aspirin enteric coated 81 milliGRAM(s) Oral daily  atorvastatin 40 milliGRAM(s) Oral at bedtime  buMETAnide 2 milliGRAM(s) Oral two times a day  ceFAZolin   IVPB 2000 milliGRAM(s) IV Intermittent every 8 hours  chlorhexidine 4% Liquid 1 Application(s) Topical <User Schedule>  gabapentin 100 milliGRAM(s) Oral three times a day  lactobacillus acidophilus 1 Tablet(s) Oral two times a day  lidocaine   4% Patch 1 Patch Transdermal daily  metoprolol succinate ER 25 milliGRAM(s) Oral daily  midodrine. 5 milliGRAM(s) Oral once  pantoprazole    Tablet 40 milliGRAM(s) Oral before breakfast  sacubitril 24 mG/valsartan 26 mG 1 Tablet(s) Oral two times a day  spironolactone 25 milliGRAM(s) Oral daily    MEDICATIONS  (PRN):  acetaminophen     Tablet .. 650 milliGRAM(s) Oral every 6 hours PRN Temp greater or equal to 38C (100.4F), Mild Pain (1 - 3)  ALBUTerol    90 MICROgram(s) HFA Inhaler 2 Puff(s) Inhalation every 6 hours PRN Shortness of Breath and/or Wheezing  aluminum hydroxide/magnesium hydroxide/simethicone Suspension 30 milliLiter(s) Oral every 4 hours PRN Dyspepsia  guaiFENesin Oral Liquid (Sugar-Free) 200 milliGRAM(s) Oral every 6 hours PRN Cough  melatonin 3 milliGRAM(s) Oral at bedtime PRN Insomnia  ondansetron Injectable 4 milliGRAM(s) IV Push every 8 hours PRN Nausea and/or Vomiting  simethicone 80 milliGRAM(s) Chew three times a day PRN Indigestion  sodium chloride 0.9% lock flush 10 milliLiter(s) IV Push every 1 hour PRN Pre/post blood products, medications, blood draw, and to maintain line patency      ALLERGIES: No Known Allergies      FAMILY HISTORY:  FH: lung cancer        Social history:  Alochol:   Smoking:   Drug Use:   Marital Status:     PHYSICAL EXAMINATION:  -----------------------------  T(C): 36.8 (02-24-22 @ 20:02), Max: 36.8 (02-24-22 @ 20:02)  HR: 96 (02-24-22 @ 20:02) (95 - 96)  BP: 95/60 (02-25-22 @ 05:44) (95/60 - 109/75)  RR: 18 (02-24-22 @ 20:02) (17 - 18)  SpO2: 96% (02-24-22 @ 20:02) (92% - 96%)  Wt(kg): --    02-24 @ 07:01  -  02-25 @ 07:00  --------------------------------------------------------  IN:  Total IN: 0 mL    OUT:    Incontinent per Condom Catheter (mL): 800 mL  Total OUT: 800 mL    Total NET: -800 mL            Constitutional: well developed, normal appearance, well groomed, well nourished, no deformities and no acute distress.   Eyes: the conjunctiva exhibited no abnormalities and the eyelids demonstrated no xanthelasmas.   HEENT: normal oral mucosa, no oral pallor and no oral cyanosis.   Neck: normal jugular venous A waves present, normal jugular venous V waves present and no jugular venous malone A waves.   Pulmonary: no respiratory distress, normal respiratory rhythm and effort, no accessory muscle use and lungs were clear to auscultation bilaterally.   Cardiovascular: heart rate and rhythm were normal, normal S1 and S2 and no murmur, gallop, rub, heave or thrill are present.   Musculoskeletal: the gait could not be assessed.   Extremities: no clubbing of the fingernails, no localized cyanosis, no petechial hemorrhages and no ischemic changes.   Skin: normal skin color and pigmentation, no rash, no venous stasis, no skin lesions, no skin ulcer and no xanthoma was observed.   Psychiatric: oriented to person, place, and time, the affect was normal, the mood was normal and not feeling anxious.     LABS:   --------  02-24    139  |  105  |  39<H>  ----------------------------<  132<H>  4.4   |  29  |  1.20    Ca    8.8      24 Feb 2022 07:52    TPro  6.0  /  Alb  2.3<L>  /  TBili  1.2  /  DBili  x   /  AST  72<H>  /  ALT  22  /  AlkPhos  552<H>  02-24                         13.9   6.33  )-----------( 182      ( 25 Feb 2022 08:56 )             45.2             Culture Results:   No growth to date. (02-23 @ 18:14)  Culture Results:   No growth to date. (02-23 @ 18:14)    02-23 @ 18:14    Organism --   Gram Stain Blood -- Gram Stain --  Specimen Source .Blood Blood-Peripheral  Culture-Blood --        Radiology:    < from: TTE Echo Complete w/o Contrast w/ Doppler (02.13.22 @ 09:00) >     EXAM:  ECHO TTE WO CON COMP W DOPP         PROCEDURE DATE:  02/13/2022        INTERPRETATION:  Ordering Physician: BETTY TORO [id number]    Indication: Bacteremia, acute on chronic systolic heart failure    Technician: AS    Study Quality: Good  A complete echocardiographic study was performed utilizing standard   protocol including spectral and color Doppler in all echocardiographic   windows.    Height: 183 cm  Weight: 72 kg  BSA: 1.93 m2  Blood Pressure: 120/90 mmHg    MEASUREMENTS  IVS:1.1 cm  PWT: 1.1 cm  LA: 3.0 cm  AO: 3.6 cm  LVIDd: 6.6 cm  LVIDs: 6.2 cm    LVEF: 10%  RVSP: 42 mmHg  RA Pressure: 15 mmHg    FINDINGS  Left Ventricle: The left ventricle is dilated with increased wall   thickness. There is severe, global hypokinesis. The left ventricular   systolic function is severely reduced with an estimated EF of 10%.  Right Ventricle: The right ventricle is normal in size with reduced   function. A lead is present.  Left Atrium: The left atrium is dilated.  Right Atrium: The right atrium is dilated.  Mitral Valve: The mitral valve leaflets are tethered beyond the annular   plane. Moderate mitral regurgitation.  Aortic Valve: The aortic valve is structurally normal. No aortic   regurgitation.  Tricuspid Valve: The tricuspid valve is structurally normal. Moderate   tricuspid regurgitation. Estimated pulmonary artery systolic pressure is   42 mmHg.  Pulmonic Valve: The pulmonic valve is not well visualized. Mild pulmonic   regurgitation.  Diastolic Function: Indeterminate.  Pericardium/Pleura: No pericardial effusion visualized. Pleural effusion   present.  Aorta: The aortic root is normal in size.    IMPRESSION:  1. Severe left ventricular systolic dysfunction.  2. Dilated left ventricle, left atrium, right atrium.  3. Moderate mitral regurgitation.  4. Moderate tricuspid regurgitation.  5. Mild pulmonary hypertension.  6. No evidence of endocarditis on this transthoracic study.    --- End of Report ---              ANUM SOMERS MD; Attending Cardiologist  This document has been electronically signed. Feb 14 2022 10:07AM    < end of copied text >  
  Patient is a 61y Male whom presented to the hospital with ckd and ki     PAST MEDICAL & SURGICAL HISTORY:  Heart failure, systolic    CAD (coronary artery disease)    Hypertension    Nonischemic cardiomyopathy    COPD, moderate    2019 novel coronavirus disease (COVID-19)    Substance abuse    HLD (hyperlipidemia)    Cardiac LV ejection fraction 10-20%    AICD (automatic cardioverter/defibrillator) present    Cardiac LV ejection fraction 10-20%        MEDICATIONS  (STANDING):  apixaban 5 milliGRAM(s) Oral every 12 hours  atorvastatin 40 milliGRAM(s) Oral at bedtime  buDESOnide    Inhalation Suspension 0.5 milliGRAM(s) Inhalation two times a day  buMETAnide Injectable 2 milliGRAM(s) IV Push every 12 hours  dexAMETHasone     Tablet 6 milliGRAM(s) Oral daily  gabapentin 100 milliGRAM(s) Oral three times a day  lactobacillus acidophilus 1 Tablet(s) Oral two times a day  pantoprazole    Tablet 40 milliGRAM(s) Oral before breakfast                                                                                                  Allergies    No Known Allergies    Intolerances    pork (Other)      SOCIAL HISTORY:  Denies ETOh,Smoking,     FAMILY HISTORY:  FH: lung cancer        REVIEW OF SYSTEMS:  unable to obtained a good review system                                                   13.9   6.33  )-----------( 182      ( 25 Feb 2022 08:56 )             45.2       CBC Full  -  ( 25 Feb 2022 08:56 )  WBC Count : 6.33 K/uL  RBC Count : 5.22 M/uL  Hemoglobin : 13.9 g/dL  Hematocrit : 45.2 %  Platelet Count - Automated : 182 K/uL  Mean Cell Volume : 86.6 fl  Mean Cell Hemoglobin : 26.6 pg  Mean Cell Hemoglobin Concentration : 30.8 gm/dL  Auto Neutrophil # : x  Auto Lymphocyte # : x  Auto Monocyte # : x  Auto Eosinophil # : x  Auto Basophil # : x  Auto Neutrophil % : x  Auto Lymphocyte % : x  Auto Monocyte % : x  Auto Eosinophil % : x  Auto Basophil % : x      02-25    141  |  106  |  32<H>  ----------------------------<  105<H>  4.7   |  30  |  1.20    Ca    9.0      25 Feb 2022 08:56    TPro  6.0  /  Alb  2.3<L>  /  TBili  1.2  /  DBili  x   /  AST  72<H>  /  ALT  22  /  AlkPhos  552<H>  02-24      CAPILLARY BLOOD GLUCOSE          Vital Signs Last 24 Hrs  T(C): 37.1 (25 Feb 2022 17:08), Max: 37.2 (25 Feb 2022 13:22)  T(F): 98.8 (25 Feb 2022 17:08), Max: 98.9 (25 Feb 2022 13:22)  HR: 101 (25 Feb 2022 17:08) (92 - 101)  BP: 95/67 (25 Feb 2022 17:08) (95/60 - 111/80)  BP(mean): --  RR: 18 (25 Feb 2022 17:08) (18 - 18)  SpO2: 94% (25 Feb 2022 17:08) (92% - 96%)                             PHYSICAL EXAM:    Constitutional: NAD  HEENT: conjunctive   clear   Neck:  No JVD  Respiratory: decrease bs b/l   Cardiovascular: S1 and S2  Gastrointestinal: BS+, soft,   Extremities: No peripheral edema    
  Patient is a 61y Male whom presented to the hospital with ckd and ki     PAST MEDICAL & SURGICAL HISTORY:  Heart failure, systolic    CAD (coronary artery disease)    Hypertension    Nonischemic cardiomyopathy    COPD, moderate    2019 novel coronavirus disease (COVID-19)    Substance abuse    HLD (hyperlipidemia)    Cardiac LV ejection fraction 10-20%    AICD (automatic cardioverter/defibrillator) present    Cardiac LV ejection fraction 10-20%        MEDICATIONS  (STANDING):  apixaban 5 milliGRAM(s) Oral every 12 hours  atorvastatin 40 milliGRAM(s) Oral at bedtime  buDESOnide    Inhalation Suspension 0.5 milliGRAM(s) Inhalation two times a day  buMETAnide Injectable 2 milliGRAM(s) IV Push every 12 hours  dexAMETHasone     Tablet 6 milliGRAM(s) Oral daily  gabapentin 100 milliGRAM(s) Oral three times a day  lactobacillus acidophilus 1 Tablet(s) Oral two times a day  pantoprazole    Tablet 40 milliGRAM(s) Oral before breakfast                                                                                            13.6   5.52  )-----------( 154      ( 22 Feb 2022 07:55 )             43.7       CBC Full  -  ( 22 Feb 2022 07:55 )  WBC Count : 5.52 K/uL  RBC Count : 5.09 M/uL  Hemoglobin : 13.6 g/dL  Hematocrit : 43.7 %  Platelet Count - Automated : 154 K/uL  Mean Cell Volume : 85.9 fl  Mean Cell Hemoglobin : 26.7 pg  Mean Cell Hemoglobin Concentration : 31.1 gm/dL  Auto Neutrophil # : x  Auto Lymphocyte # : x  Auto Monocyte # : x  Auto Eosinophil # : x  Auto Basophil # : x  Auto Neutrophil % : x  Auto Lymphocyte % : x  Auto Monocyte % : x  Auto Eosinophil % : x  Auto Basophil % : x      02-22    141  |  104  |  59<H>  ----------------------------<  111<H>  4.4   |  33<H>  |  1.20    Ca    8.2<L>      22 Feb 2022 07:55    TPro  5.3<L>  /  Alb  2.1<L>  /  TBili  0.8  /  DBili  x   /  AST  53<H>  /  ALT  20  /  AlkPhos  414<H>  02-22      CAPILLARY BLOOD GLUCOSE          Vital Signs Last 24 Hrs  T(C): 36.8 (22 Feb 2022 12:01), Max: 38.2 (21 Feb 2022 20:11)  T(F): 98.3 (22 Feb 2022 12:01), Max: 100.7 (21 Feb 2022 20:11)  HR: 80 (22 Feb 2022 12:01) (78 - 97)  BP: 97/64 (22 Feb 2022 12:01) (95/- - 126/88)  BP(mean): --  RR: 18 (22 Feb 2022 12:01) (18 - 20)  SpO2: 98% (22 Feb 2022 12:01) (90% - 98%)                    Allergies    No Known Allergies    Intolerances    pork (Other)      SOCIAL HISTORY:  Denies ETOh,Smoking,     FAMILY HISTORY:  FH: lung cancer        REVIEW OF SYSTEMS:  unable to obtained a good review system                                PHYSICAL EXAM:    Constitutional: NAD  HEENT: conjunctive   clear   Neck:  No JVD  Respiratory: decrease bs b/l   Cardiovascular: S1 and S2  Gastrointestinal: BS+, soft,   Extremities: No peripheral edema    
Date/Time Patient Seen:  		  Referring MD:   Data Reviewed	       Patient is a 61y old  Male who presents with a chief complaint of SOB (17 Feb 2022 17:36)      Subjective/HPI     PAST MEDICAL & SURGICAL HISTORY:  Heart failure, systolic    CAD (coronary artery disease)    Hypertension    Nonischemic cardiomyopathy    COPD, moderate    2019 novel coronavirus disease (COVID-19)    Substance abuse    HLD (hyperlipidemia)    Cardiac LV ejection fraction 10-20%    No significant past surgical history    AICD (automatic cardioverter/defibrillator) present    Cardiac LV ejection fraction 10-20%          Medication list         MEDICATIONS  (STANDING):  aMIOdarone    Tablet 200 milliGRAM(s) Oral daily  apixaban 5 milliGRAM(s) Oral every 12 hours  aspirin enteric coated 81 milliGRAM(s) Oral daily  atorvastatin 40 milliGRAM(s) Oral at bedtime  buMETAnide 2 milliGRAM(s) Oral two times a day  ceFAZolin   IVPB 2000 milliGRAM(s) IV Intermittent every 8 hours  gabapentin 100 milliGRAM(s) Oral three times a day  lactobacillus acidophilus 1 Tablet(s) Oral two times a day  lidocaine   4% Patch 1 Patch Transdermal daily  metoprolol succinate ER 25 milliGRAM(s) Oral daily  pantoprazole    Tablet 40 milliGRAM(s) Oral before breakfast  sacubitril 24 mG/valsartan 26 mG 1 Tablet(s) Oral two times a day  spironolactone 25 milliGRAM(s) Oral daily    MEDICATIONS  (PRN):  acetaminophen     Tablet .. 650 milliGRAM(s) Oral every 6 hours PRN Temp greater or equal to 38C (100.4F), Mild Pain (1 - 3)  ALBUTerol    90 MICROgram(s) HFA Inhaler 2 Puff(s) Inhalation every 6 hours PRN Shortness of Breath and/or Wheezing  aluminum hydroxide/magnesium hydroxide/simethicone Suspension 30 milliLiter(s) Oral every 4 hours PRN Dyspepsia  guaiFENesin Oral Liquid (Sugar-Free) 200 milliGRAM(s) Oral every 6 hours PRN Cough  melatonin 3 milliGRAM(s) Oral at bedtime PRN Insomnia  ondansetron Injectable 4 milliGRAM(s) IV Push every 8 hours PRN Nausea and/or Vomiting  simethicone 80 milliGRAM(s) Chew three times a day PRN Indigestion         Vitals log        ICU Vital Signs Last 24 Hrs  T(C): 36.2 (18 Feb 2022 05:03), Max: 36.8 (17 Feb 2022 10:55)  T(F): 97.2 (18 Feb 2022 05:03), Max: 98.3 (17 Feb 2022 10:55)  HR: 71 (18 Feb 2022 05:03) (61 - 71)  BP: 114/79 (18 Feb 2022 05:03) (99/64 - 114/79)  BP(mean): --  ABP: --  ABP(mean): --  RR: 17 (18 Feb 2022 05:03) (17 - 18)  SpO2: 97% (18 Feb 2022 05:03) (95% - 97%)           Input and Output:  I&O's Detail    16 Feb 2022 07:01  -  17 Feb 2022 07:00  --------------------------------------------------------  IN:  Total IN: 0 mL    OUT:    Incontinent per Condom Catheter (mL): 2850 mL  Total OUT: 2850 mL    Total NET: -2850 mL      17 Feb 2022 07:01  -  18 Feb 2022 06:05  --------------------------------------------------------  IN:  Total IN: 0 mL    OUT:    Incontinent per Condom Catheter (mL): 850 mL  Total OUT: 850 mL    Total NET: -850 mL          Lab Data                        14.9   9.70  )-----------( 206      ( 17 Feb 2022 07:27 )             47.2     02-17    134<L>  |  97  |  68<H>  ----------------------------<  213<H>  4.7   |  29  |  1.40<H>    Ca    7.9<L>      17 Feb 2022 07:27      ABG - ( 17 Feb 2022 10:03 )  pH, Arterial: 7.42  pH, Blood: x     /  pCO2: 53    /  pO2: 85    / HCO3: 34    / Base Excess: 9.9   /  SaO2: 97.8                    Review of Systems	      Objective     Physical Examination    heart s1s2  lung dec BS  abd soft      Pertinent Lab findings & Imaging      Leo:  NO   Adequate UO     I&O's Detail    16 Feb 2022 07:01  -  17 Feb 2022 07:00  --------------------------------------------------------  IN:  Total IN: 0 mL    OUT:    Incontinent per Condom Catheter (mL): 2850 mL  Total OUT: 2850 mL    Total NET: -2850 mL      17 Feb 2022 07:01  -  18 Feb 2022 06:05  --------------------------------------------------------  IN:  Total IN: 0 mL    OUT:    Incontinent per Condom Catheter (mL): 850 mL  Total OUT: 850 mL    Total NET: -850 mL               Discussed with:     Cultures:	        Radiology                            
Date/Time Patient Seen:  		  Referring MD:   Data Reviewed	       Patient is a 61y old  Male who presents with a chief complaint of SOB (19 Feb 2022 13:16)      Subjective/HPI     PAST MEDICAL & SURGICAL HISTORY:  Heart failure, systolic    CAD (coronary artery disease)    Hypertension    Nonischemic cardiomyopathy    COPD, moderate    2019 novel coronavirus disease (COVID-19)    Substance abuse    HLD (hyperlipidemia)    Cardiac LV ejection fraction 10-20%    No significant past surgical history    AICD (automatic cardioverter/defibrillator) present    Cardiac LV ejection fraction 10-20%          Medication list         MEDICATIONS  (STANDING):  aMIOdarone    Tablet 200 milliGRAM(s) Oral daily  apixaban 5 milliGRAM(s) Oral every 12 hours  aspirin enteric coated 81 milliGRAM(s) Oral daily  atorvastatin 40 milliGRAM(s) Oral at bedtime  buMETAnide 2 milliGRAM(s) Oral two times a day  ceFAZolin   IVPB 2000 milliGRAM(s) IV Intermittent every 8 hours  chlorhexidine 4% Liquid 1 Application(s) Topical <User Schedule>  gabapentin 100 milliGRAM(s) Oral three times a day  lactobacillus acidophilus 1 Tablet(s) Oral two times a day  lidocaine   4% Patch 1 Patch Transdermal daily  metoprolol succinate ER 25 milliGRAM(s) Oral daily  pantoprazole    Tablet 40 milliGRAM(s) Oral before breakfast  sacubitril 24 mG/valsartan 26 mG 1 Tablet(s) Oral two times a day  spironolactone 25 milliGRAM(s) Oral daily    MEDICATIONS  (PRN):  acetaminophen     Tablet .. 650 milliGRAM(s) Oral every 6 hours PRN Temp greater or equal to 38C (100.4F), Mild Pain (1 - 3)  ALBUTerol    90 MICROgram(s) HFA Inhaler 2 Puff(s) Inhalation every 6 hours PRN Shortness of Breath and/or Wheezing  aluminum hydroxide/magnesium hydroxide/simethicone Suspension 30 milliLiter(s) Oral every 4 hours PRN Dyspepsia  guaiFENesin Oral Liquid (Sugar-Free) 200 milliGRAM(s) Oral every 6 hours PRN Cough  melatonin 3 milliGRAM(s) Oral at bedtime PRN Insomnia  ondansetron Injectable 4 milliGRAM(s) IV Push every 8 hours PRN Nausea and/or Vomiting  simethicone 80 milliGRAM(s) Chew three times a day PRN Indigestion  sodium chloride 0.9% lock flush 10 milliLiter(s) IV Push every 1 hour PRN Pre/post blood products, medications, blood draw, and to maintain line patency         Vitals log        ICU Vital Signs Last 24 Hrs  T(C): 36.9 (20 Feb 2022 04:35), Max: 37.1 (19 Feb 2022 21:06)  T(F): 98.5 (20 Feb 2022 04:35), Max: 98.7 (19 Feb 2022 21:06)  HR: 81 (20 Feb 2022 04:35) (80 - 83)  BP: 94/58 (20 Feb 2022 04:35) (82/60 - 94/58)  BP(mean): --  ABP: --  ABP(mean): --  RR: 17 (20 Feb 2022 04:35) (17 - 18)  SpO2: 95% (20 Feb 2022 04:35) (91% - 95%)           Input and Output:  I&O's Detail    18 Feb 2022 07:01  -  19 Feb 2022 07:00  --------------------------------------------------------  IN:  Total IN: 0 mL    OUT:    Incontinent per Condom Catheter (mL): 1300 mL    Voided (mL): 600 mL  Total OUT: 1900 mL    Total NET: -1900 mL      19 Feb 2022 07:01  -  20 Feb 2022 06:23  --------------------------------------------------------  IN:  Total IN: 0 mL    OUT:    Incontinent per Condom Catheter (mL): 700 mL    Voided (mL): 400 mL  Total OUT: 1100 mL    Total NET: -1100 mL          Lab Data                        16.8   6.17  )-----------( 189      ( 19 Feb 2022 08:07 )             52.2     02-19    135  |  96  |  95<H>  ----------------------------<  115<H>  5.7<H>   |  36<H>  |  1.80<H>    Ca    7.9<L>      19 Feb 2022 09:28              Review of Systems	      Objective     Physical Examination    heart s1s2  lung dc BS  abd soft      Pertinent Lab findings & Imaging      Leo:  NO   Adequate UO     I&O's Detail    18 Feb 2022 07:01  -  19 Feb 2022 07:00  --------------------------------------------------------  IN:  Total IN: 0 mL    OUT:    Incontinent per Condom Catheter (mL): 1300 mL    Voided (mL): 600 mL  Total OUT: 1900 mL    Total NET: -1900 mL      19 Feb 2022 07:01  -  20 Feb 2022 06:23  --------------------------------------------------------  IN:  Total IN: 0 mL    OUT:    Incontinent per Condom Catheter (mL): 700 mL    Voided (mL): 400 mL  Total OUT: 1100 mL    Total NET: -1100 mL               Discussed with:     Cultures:	        Radiology                            
Date/Time Patient Seen:  		  Referring MD:   Data Reviewed	       Patient is a 61y old  Male who presents with a chief complaint of SOB (21 Feb 2022 13:32)      Subjective/HPI     PAST MEDICAL & SURGICAL HISTORY:  Heart failure, systolic    CAD (coronary artery disease)    Hypertension    Nonischemic cardiomyopathy    COPD, moderate    2019 novel coronavirus disease (COVID-19)    Substance abuse    HLD (hyperlipidemia)    Cardiac LV ejection fraction 10-20%    No significant past surgical history    AICD (automatic cardioverter/defibrillator) present    Cardiac LV ejection fraction 10-20%          Medication list         MEDICATIONS  (STANDING):  aMIOdarone    Tablet 200 milliGRAM(s) Oral daily  apixaban 5 milliGRAM(s) Oral every 12 hours  aspirin enteric coated 81 milliGRAM(s) Oral daily  atorvastatin 40 milliGRAM(s) Oral at bedtime  buMETAnide 2 milliGRAM(s) Oral two times a day  ceFAZolin   IVPB 2000 milliGRAM(s) IV Intermittent every 8 hours  chlorhexidine 4% Liquid 1 Application(s) Topical <User Schedule>  gabapentin 100 milliGRAM(s) Oral three times a day  lactobacillus acidophilus 1 Tablet(s) Oral two times a day  lidocaine   4% Patch 1 Patch Transdermal daily  metoprolol succinate ER 25 milliGRAM(s) Oral daily  midodrine. 5 milliGRAM(s) Oral once  pantoprazole    Tablet 40 milliGRAM(s) Oral before breakfast  spironolactone 25 milliGRAM(s) Oral daily    MEDICATIONS  (PRN):  acetaminophen     Tablet .. 650 milliGRAM(s) Oral every 6 hours PRN Temp greater or equal to 38C (100.4F), Mild Pain (1 - 3)  ALBUTerol    90 MICROgram(s) HFA Inhaler 2 Puff(s) Inhalation every 6 hours PRN Shortness of Breath and/or Wheezing  aluminum hydroxide/magnesium hydroxide/simethicone Suspension 30 milliLiter(s) Oral every 4 hours PRN Dyspepsia  guaiFENesin Oral Liquid (Sugar-Free) 200 milliGRAM(s) Oral every 6 hours PRN Cough  melatonin 3 milliGRAM(s) Oral at bedtime PRN Insomnia  ondansetron Injectable 4 milliGRAM(s) IV Push every 8 hours PRN Nausea and/or Vomiting  simethicone 80 milliGRAM(s) Chew three times a day PRN Indigestion  sodium chloride 0.9% lock flush 10 milliLiter(s) IV Push every 1 hour PRN Pre/post blood products, medications, blood draw, and to maintain line patency         Vitals log        ICU Vital Signs Last 24 Hrs  T(C): 36.7 (22 Feb 2022 04:26), Max: 38.2 (21 Feb 2022 20:11)  T(F): 98 (22 Feb 2022 04:26), Max: 100.7 (21 Feb 2022 20:11)  HR: 78 (22 Feb 2022 05:36) (78 - 97)  BP: 100/61 (22 Feb 2022 05:36) (93/66 - 126/88)  BP(mean): --  ABP: --  ABP(mean): --  RR: 18 (22 Feb 2022 04:26) (18 - 20)  SpO2: 94% (22 Feb 2022 04:26) (90% - 94%)           Input and Output:  I&O's Detail    20 Feb 2022 07:01  -  21 Feb 2022 07:00  --------------------------------------------------------  IN:  Total IN: 0 mL    OUT:    Incontinent per Condom Catheter (mL): 400 mL    Voided (mL): 800 mL  Total OUT: 1200 mL    Total NET: -1200 mL      21 Feb 2022 07:01  -  22 Feb 2022 06:06  --------------------------------------------------------  IN:  Total IN: 0 mL    OUT:    Incontinent per Condom Catheter (mL): 400 mL    Voided (mL): 900 mL  Total OUT: 1300 mL    Total NET: -1300 mL          Lab Data                        14.0   6.03  )-----------( 146      ( 22 Feb 2022 00:43 )             44.0     02-22    141  |  106  |  62<H>  ----------------------------<  103<H>  4.9   |  32<H>  |  1.20    Ca    7.9<L>      22 Feb 2022 00:43    TPro  5.3<L>  /  Alb  2.1<L>  /  TBili  0.8  /  DBili  x   /  AST  53<H>  /  ALT  20  /  AlkPhos  414<H>  02-22            Review of Systems	      Objective     Physical Examination  heart s1s2  lung dec BS  abd soft        Pertinent Lab findings & Imaging      Leo:  NO   Adequate UO     I&O's Detail    20 Feb 2022 07:01  -  21 Feb 2022 07:00  --------------------------------------------------------  IN:  Total IN: 0 mL    OUT:    Incontinent per Condom Catheter (mL): 400 mL    Voided (mL): 800 mL  Total OUT: 1200 mL    Total NET: -1200 mL      21 Feb 2022 07:01  -  22 Feb 2022 06:06  --------------------------------------------------------  IN:  Total IN: 0 mL    OUT:    Incontinent per Condom Catheter (mL): 400 mL    Voided (mL): 900 mL  Total OUT: 1300 mL    Total NET: -1300 mL               Discussed with:     Cultures:	        Radiology                            
[facility], Division of Infectious Diseases  MARINA Mccray Y. Patel, S. Shah, G. Casimir  [phone]  ([phone] - weekdays after 5pm and weekends)    Name: DOROTHY OVSS  Age/Gender: 61y Male  MRN: [id number]    Interval History:  Patient seen this morning. Patient states he feels well.  Was febrile to 102.1F yesterday evening, denies feeling fever, chills or sweating.   States cough is overall changed, feels it is more when he moves.   Denies chest pain, dyspnea, abdominal pain, nausea, vomiting, diarrhea.  No pain when using PICC line, no pain at PICC line site.   Notes reviewed.     Allergies: No Known Allergies    Objective:  Vitals:   T(F): 98.6 (02-23-22 @ 12:10), Max: 102.1 (02-22-22 @ 19:13)  HR: 94 (02-23-22 @ 12:10) (94 - 101)  BP: 93/65 (02-23-22 @ 12:10) (85/56 - 102/66)  RR: 18 (02-23-22 @ 12:10) (18 - 20)  SpO2: 92% (02-23-22 @ 12:10) (92% - 95%)  Physical Examination:  General: no acute distress, nontoxic appearing  HEENT: NC/AT, EOMI, anicteric, neck supple  Cardio: S1, S2 present, normal rate, no murmur  Resp: clear to auscultation bilaterally  Abd: soft, NT, ND, + BS  Neuro: AAOx3, no obvious focal deficits  Ext: no edema or cyanosis, moving extremities  Skin: warm, dry, no visible rash  Psych: appropriate affect and mood for situation  Lines: RUE PICC line without erythema or TTP    Laboratory Studies:  CBC:                       13.6   5.52  )-----------( 154      ( 22 Feb 2022 07:55 )             43.7     WBC Trend:  5.52 02-22-22 @ 07:55  6.03 02-22-22 @ 00:43  5.24 02-21-22 @ 08:04  6.17 02-19-22 @ 08:07  9.70 02-17-22 @ 07:27    CMP: 02-22    141  |  104  |  59<H>  ----------------------------<  111<H>  4.4   |  33<H>  |  1.20    Ca    8.2<L>      22 Feb 2022 07:55    TPro  5.3<L>  /  Alb  2.1<L>  /  TBili  0.8  /  DBili  x   /  AST  53<H>  /  ALT  20  /  AlkPhos  414<H>  02-22    LIVER FUNCTIONS - ( 22 Feb 2022 00:43 )  Alb: 2.1 g/dL / Pro: 5.3 g/dL / ALK PHOS: 414 U/L / ALT: 20 U/L / AST: 53 U/L / GGT: x           Microbiology: reviewed   Culture - Blood (collected 02-22-22 @ 06:35)  Source: .Blood Blood  Preliminary Report (02-23-22 @ 07:01):    No growth to date.    Culture - Blood (collected 02-22-22 @ 06:35)  Source: .Blood Blood  Preliminary Report (02-23-22 @ 07:01):    No growth to date.    Culture - Urine (collected 02-15-22 @ 16:24)  Source: Clean Catch Clean Catch (Midstream)  Final Report (02-16-22 @ 13:24):    No growth    Culture - Blood (collected 02-14-22 @ 12:17)  Source: .Blood Blood-Peripheral  Final Report (02-19-22 @ 13:00):    No Growth Final    Culture - Blood (collected 02-12-22 @ 12:06)  Source: .Blood Blood-Peripheral  Gram Stain (02-13-22 @ 08:12):    Growth in aerobic bottle: Gram Positive Cocci in Clusters    Growth in anaerobic bottle: Gram Positive Cocci in Clusters  Final Report (02-14-22 @ 07:16):    Growth in aerobic and anaerobic bottles: Staphylococcus aureus    See previous culture [id number]    Culture - Blood (collected 02-12-22 @ 12:06)  Source: .Blood Blood-Peripheral  Gram Stain (02-13-22 @ 08:15):    Growth in aerobic bottle: Gram Positive Cocci in Clusters    Growth in anaerobic bottle: Gram Positive Cocci in Clusters  Final Report (02-14-22 @ 07:15):    Growth in aerobic and anaerobic bottles: Staphylococcus aureus    See previous culture [id number]    Culture - Blood (collected 02-11-22 @ 16:19)  Source: .Blood Blood-Peripheral  Gram Stain (02-12-22 @ 07:00):    Growth in aerobic bottle: Gram Positive Cocci in Clusters    Growth in anaerobic bottle: Gram Positive Cocci in Clusters  Final Report (02-14-22 @ 07:13):    Growth in aerobic and anaerobic bottles: Staphylococcus aureus    ***Blood Panel PCR results on this specimen are available    approximately 3 hours after the Gram stain result.***    Gram stain, PCR, and/or culture results may not always    correspond dueto difference in methodologies.    ************************************************************    This PCR assay was performed by multiplex PCR. This    Assay tests for 66 bacterial and resistance gene targets.    Please refer to the [facility] Labs test directory    at [email or web address] for details.  Organism: Blood Culture PCR  Staphylococcus aureus (02-14-22 @ 07:13)  Organism: Staphylococcus aureus (02-14-22 @ 07:13)      -  Ampicillin/Sulbactam: S <=8/4      -  Cefazolin: S <=4      -  Clindamycin: R <=0.25 This isolate is presumed to be clindamycin resistant based on detection of inducible resistance. Clindamycin may still be effective in some patients.      -  Erythromycin: R >4      -  Gentamicin: S <=1 Should not be used as monotherapy      -  Oxacillin: S 0.5      -  Penicillin: R 4      -  Rifampin: S <=1 Should not be used as monotherapy      -  Tetra/Doxy: S <=1      -  Trimethoprim/Sulfamethoxazole: S <=0.5/9.5      -  Vancomycin: S 1      Method Type: BAY  Organism: Blood Culture PCR (02-14-22 @ 07:13)      -  Staphylococcus aureus: Detec Any isolate of Staphylococcus aureus from a blood culture is NOT considered a contaminant.      Method Type: PCR    Culture - Blood (collected 02-11-22 @ 16:19)  Source: .Blood Blood-Peripheral  Gram Stain (02-12-22 @ 06:59):    Growth in aerobic bottle: Gram Positive Cocci in Clusters    Growth in anaerobic bottle: Gram Positive Cocci in Clusters  Final Report (02-14-22 @ 07:13):    Growth in aerobic and anaerobic bottles: Staphylococcus aureus    See previous culture [id number]    Radiology: reviewed     Medications:  acetaminophen     Tablet .. 650 milliGRAM(s) Oral every 6 hours PRN  ALBUTerol    90 MICROgram(s) HFA Inhaler 2 Puff(s) Inhalation every 6 hours PRN  aluminum hydroxide/magnesium hydroxide/simethicone Suspension 30 milliLiter(s) Oral every 4 hours PRN  aMIOdarone    Tablet 200 milliGRAM(s) Oral daily  apixaban 5 milliGRAM(s) Oral every 12 hours  aspirin enteric coated 81 milliGRAM(s) Oral daily  atorvastatin 40 milliGRAM(s) Oral at bedtime  buMETAnide 2 milliGRAM(s) Oral two times a day  ceFAZolin   IVPB 2000 milliGRAM(s) IV Intermittent every 8 hours  chlorhexidine 4% Liquid 1 Application(s) Topical <User Schedule>  gabapentin 100 milliGRAM(s) Oral three times a day  guaiFENesin Oral Liquid (Sugar-Free) 200 milliGRAM(s) Oral every 6 hours PRN  lactobacillus acidophilus 1 Tablet(s) Oral two times a day  lidocaine   4% Patch 1 Patch Transdermal daily  melatonin 3 milliGRAM(s) Oral at bedtime PRN  metoprolol succinate ER 25 milliGRAM(s) Oral daily  midodrine. 5 milliGRAM(s) Oral once  ondansetron Injectable 4 milliGRAM(s) IV Push every 8 hours PRN  pantoprazole    Tablet 40 milliGRAM(s) Oral before breakfast  simethicone 80 milliGRAM(s) Chew three times a day PRN  sodium chloride 0.9% lock flush 10 milliLiter(s) IV Push every 1 hour PRN  spironolactone 25 milliGRAM(s) Oral daily    Antimicrobials:  ceFAZolin   IVPB 2000 milliGRAM(s) IV Intermittent every 8 hours  
[facility], Division of Infectious Diseases  MARINA Mccray Y. Patel, S. Shah, G. Casimir  [phone]  ([phone] - weekdays after 5pm and weekends)    Name: DOROTHY VOSS  Age/Gender: 61y Male  MRN: [id number]    Interval History:  Patient seen this afternoon.   No new complaints  Notes reviewed  No concerning overnight events  Afebrile     Allergies: No Known Allergies    Objective:  Vitals:   T(F): 98.6 (02-21-22 @ 12:21), Max: 99.8 (02-20-22 @ 20:33)  HR: 86 (02-21-22 @ 12:21) (86 - 98)  BP: 93/66 (02-21-22 @ 12:21) (80/57 - 128/87)  RR: 18 (02-21-22 @ 12:21) (18 - 18)  SpO2: 92% (02-21-22 @ 12:21) (92% - 96%)  Physical Examination:  General: no acute distress  HEENT: NC/AT, anicteric, neck supple  Respiratory: decreased breath sounds b/l  Cardiovascular: S1 and S2 present  Gastrointestinal: soft, nontender, nondistended  Extremities: no edema, no cyanosis  Skin: no visible rash    Laboratory Studies:  CBC:                       15.2   5.24  )-----------( 132      ( 21 Feb 2022 08:04 )             48.4     WBC Trend:  5.24 02-21-22 @ 08:04  6.17 02-19-22 @ 08:07  9.70 02-17-22 @ 07:27  9.90 02-16-22 @ 07:27  9.76 02-15-22 @ 07:53    CMP: 02-21    140  |  103  |  64<H>  ----------------------------<  123<H>  4.5   |  33<H>  |  1.40<H>    Ca    8.5      21 Feb 2022 09:32    TPro  5.9<L>  /  Alb  2.4<L>  /  TBili  1.2  /  DBili  x   /  AST  46<H>  /  ALT  17  /  AlkPhos  339<H>  02-21    LIVER FUNCTIONS - ( 21 Feb 2022 09:32 )  Alb: 2.4 g/dL / Pro: 5.9 g/dL / ALK PHOS: 339 U/L / ALT: 17 U/L / AST: 46 U/L / GGT: x           Microbiology: reviewed   Culture - Urine (collected 02-15-22 @ 16:24)  Source: Clean Catch Clean Catch (Midstream)  Final Report (02-16-22 @ 13:24):    No growth    Culture - Blood (collected 02-14-22 @ 12:17)  Source: .Blood Blood-Peripheral  Final Report (02-19-22 @ 13:00):    No Growth Final    Culture - Blood (collected 02-12-22 @ 12:06)  Source: .Blood Blood-Peripheral  Gram Stain (02-13-22 @ 08:12):    Growth in aerobic bottle: Gram Positive Cocci in Clusters    Growth in anaerobic bottle: Gram Positive Cocci in Clusters  Final Report (02-14-22 @ 07:16):    Growth in aerobic and anaerobic bottles: Staphylococcus aureus    See previous culture [id number]    Culture - Blood (collected 02-12-22 @ 12:06)  Source: .Blood Blood-Peripheral  Gram Stain (02-13-22 @ 08:15):    Growth in aerobic bottle: Gram Positive Cocci in Clusters    Growth in anaerobic bottle: Gram Positive Cocci in Clusters  Final Report (02-14-22 @ 07:15):    Growth in aerobic and anaerobic bottles: Staphylococcus aureus    See previous culture [id number]    Culture - Blood (collected 02-11-22 @ 16:19)  Source: .Blood Blood-Peripheral  Gram Stain (02-12-22 @ 07:00):    Growth in aerobic bottle: Gram Positive Cocci in Clusters    Growth in anaerobic bottle: Gram Positive Cocci in Clusters  Final Report (02-14-22 @ 07:13):    Growth in aerobic and anaerobic bottles: Staphylococcus aureus    ***Blood Panel PCR results on this specimen are available    approximately 3 hours after the Gram stain result.***    Gram stain, PCR, and/or culture results may not always    correspond dueto difference in methodologies.    ************************************************************    This PCR assay was performed by multiplex PCR. This    Assay tests for 66 bacterial and resistance gene targets.    Please refer to the [facility] test directory    at [email or web address] for details.  Organism: Blood Culture PCR  Staphylococcus aureus (02-14-22 @ 07:13)  Organism: Staphylococcus aureus (02-14-22 @ 07:13)      -  Ampicillin/Sulbactam: S <=8/4      -  Cefazolin: S <=4      -  Clindamycin: R <=0.25 This isolate is presumed to be clindamycin resistant based on detection of inducible resistance. Clindamycin may still be effective in some patients.      -  Erythromycin: R >4      -  Gentamicin: S <=1 Should not be used as monotherapy      -  Oxacillin: S 0.5      -  Penicillin: R 4      -  Rifampin: S <=1 Should not be used as monotherapy      -  Tetra/Doxy: S <=1      -  Trimethoprim/Sulfamethoxazole: S <=0.5/9.5      -  Vancomycin: S 1      Method Type: BAY  Organism: Blood Culture PCR (02-14-22 @ 07:13)      -  Staphylococcus aureus: Detec Any isolate of Staphylococcus aureus from a blood culture is NOT considered a contaminant.      Method Type: PCR    Culture - Blood (collected 02-11-22 @ 16:19)  Source: .Blood Blood-Peripheral  Gram Stain (02-12-22 @ 06:59):    Growth in aerobic bottle: Gram Positive Cocci in Clusters    Growth in anaerobic bottle: Gram Positive Cocci in Clusters  Final Report (02-14-22 @ 07:13):    Growth in aerobic and anaerobic bottles: Staphylococcus aureus    See previous culture [id number]    Radiology: reviewed     Medications:  acetaminophen     Tablet .. 650 milliGRAM(s) Oral every 6 hours PRN  ALBUTerol    90 MICROgram(s) HFA Inhaler 2 Puff(s) Inhalation every 6 hours PRN  aluminum hydroxide/magnesium hydroxide/simethicone Suspension 30 milliLiter(s) Oral every 4 hours PRN  aMIOdarone    Tablet 200 milliGRAM(s) Oral daily  apixaban 5 milliGRAM(s) Oral every 12 hours  aspirin enteric coated 81 milliGRAM(s) Oral daily  atorvastatin 40 milliGRAM(s) Oral at bedtime  buMETAnide 2 milliGRAM(s) Oral two times a day  ceFAZolin   IVPB 2000 milliGRAM(s) IV Intermittent every 8 hours  chlorhexidine 4% Liquid 1 Application(s) Topical <User Schedule>  gabapentin 100 milliGRAM(s) Oral three times a day  guaiFENesin Oral Liquid (Sugar-Free) 200 milliGRAM(s) Oral every 6 hours PRN  lactobacillus acidophilus 1 Tablet(s) Oral two times a day  lidocaine   4% Patch 1 Patch Transdermal daily  melatonin 3 milliGRAM(s) Oral at bedtime PRN  metoprolol succinate ER 25 milliGRAM(s) Oral daily  midodrine. 5 milliGRAM(s) Oral once  ondansetron Injectable 4 milliGRAM(s) IV Push every 8 hours PRN  pantoprazole    Tablet 40 milliGRAM(s) Oral before breakfast  simethicone 80 milliGRAM(s) Chew three times a day PRN  sodium chloride 0.9% lock flush 10 milliLiter(s) IV Push every 1 hour PRN  spironolactone 25 milliGRAM(s) Oral daily    Antimicrobials:  ceFAZolin   IVPB 2000 milliGRAM(s) IV Intermittent every 8 hours  
  Patient is a 61y Male whom presented to the hospital with ckd and ki     PAST MEDICAL & SURGICAL HISTORY:  Heart failure, systolic    CAD (coronary artery disease)    Hypertension    Nonischemic cardiomyopathy    COPD, moderate    2019 novel coronavirus disease (COVID-19)    Substance abuse    HLD (hyperlipidemia)    Cardiac LV ejection fraction 10-20%    AICD (automatic cardioverter/defibrillator) present    Cardiac LV ejection fraction 10-20%        MEDICATIONS  (STANDING):  apixaban 5 milliGRAM(s) Oral every 12 hours  atorvastatin 40 milliGRAM(s) Oral at bedtime  buDESOnide    Inhalation Suspension 0.5 milliGRAM(s) Inhalation two times a day  buMETAnide Injectable 2 milliGRAM(s) IV Push every 12 hours  dexAMETHasone     Tablet 6 milliGRAM(s) Oral daily  gabapentin 100 milliGRAM(s) Oral three times a day  lactobacillus acidophilus 1 Tablet(s) Oral two times a day  pantoprazole    Tablet 40 milliGRAM(s) Oral before breakfast                                                                                                  Allergies    No Known Allergies    Intolerances    pork (Other)      SOCIAL HISTORY:  Denies ETOh,Smoking,     FAMILY HISTORY:  FH: lung cancer        REVIEW OF SYSTEMS:  unable to obtained a good review system                                   13.0   6.97  )-----------( 174      ( 24 Feb 2022 07:52 )             42.0       CBC Full  -  ( 24 Feb 2022 07:52 )  WBC Count : 6.97 K/uL  RBC Count : 4.88 M/uL  Hemoglobin : 13.0 g/dL  Hematocrit : 42.0 %  Platelet Count - Automated : 174 K/uL  Mean Cell Volume : 86.1 fl  Mean Cell Hemoglobin : 26.6 pg  Mean Cell Hemoglobin Concentration : 31.0 gm/dL  Auto Neutrophil # : 5.09 K/uL  Auto Lymphocyte # : 0.70 K/uL  Auto Monocyte # : 1.18 K/uL  Auto Eosinophil # : 0.00 K/uL  Auto Basophil # : 0.00 K/uL  Auto Neutrophil % : 73.0 %  Auto Lymphocyte % : 10.0 %  Auto Monocyte % : 17.0 %  Auto Eosinophil % : 0.0 %  Auto Basophil % : 0.0 %      02-24    139  |  105  |  39<H>  ----------------------------<  132<H>  4.4   |  29  |  1.20    Ca    8.8      24 Feb 2022 07:52    TPro  6.0  /  Alb  2.3<L>  /  TBili  1.2  /  DBili  x   /  AST  72<H>  /  ALT  22  /  AlkPhos  552<H>  02-24      CAPILLARY BLOOD GLUCOSE          Vital Signs Last 24 Hrs  T(C): 36.4 (24 Feb 2022 12:55), Max: 37 (24 Feb 2022 05:12)  T(F): 97.5 (24 Feb 2022 12:55), Max: 98.6 (24 Feb 2022 05:12)  HR: 95 (24 Feb 2022 12:55) (95 - 103)  BP: 109/75 (24 Feb 2022 12:55) (104/72 - 109/75)  BP(mean): --  RR: 17 (24 Feb 2022 12:55) (17 - 17)  SpO2: 92% (24 Feb 2022 12:55) (92% - 94%)                           PHYSICAL EXAM:    Constitutional: NAD  HEENT: conjunctive   clear   Neck:  No JVD  Respiratory: decrease bs b/l   Cardiovascular: S1 and S2  Gastrointestinal: BS+, soft,   Extremities: No peripheral edema    
  Patient is a 61y Male whom presented to the hospital with ckd and ki     PAST MEDICAL & SURGICAL HISTORY:  Heart failure, systolic    CAD (coronary artery disease)    Hypertension    Nonischemic cardiomyopathy    COPD, moderate    2019 novel coronavirus disease (COVID-19)    Substance abuse    HLD (hyperlipidemia)    Cardiac LV ejection fraction 10-20%    AICD (automatic cardioverter/defibrillator) present    Cardiac LV ejection fraction 10-20%        MEDICATIONS  (STANDING):  apixaban 5 milliGRAM(s) Oral every 12 hours  atorvastatin 40 milliGRAM(s) Oral at bedtime  buDESOnide    Inhalation Suspension 0.5 milliGRAM(s) Inhalation two times a day  buMETAnide Injectable 2 milliGRAM(s) IV Push every 12 hours  dexAMETHasone     Tablet 6 milliGRAM(s) Oral daily  gabapentin 100 milliGRAM(s) Oral three times a day  lactobacillus acidophilus 1 Tablet(s) Oral two times a day  pantoprazole    Tablet 40 milliGRAM(s) Oral before breakfast                                                                                   15.2   5.24  )-----------( 132      ( 21 Feb 2022 08:04 )             48.4       CBC Full  -  ( 21 Feb 2022 08:04 )  WBC Count : 5.24 K/uL  RBC Count : 5.67 M/uL  Hemoglobin : 15.2 g/dL  Hematocrit : 48.4 %  Platelet Count - Automated : 132 K/uL  Mean Cell Volume : 85.4 fl  Mean Cell Hemoglobin : 26.8 pg  Mean Cell Hemoglobin Concentration : 31.4 gm/dL  Auto Neutrophil # : x  Auto Lymphocyte # : x  Auto Monocyte # : x  Auto Eosinophil # : x  Auto Basophil # : x  Auto Neutrophil % : x  Auto Lymphocyte % : x  Auto Monocyte % : x  Auto Eosinophil % : x  Auto Basophil % : x      02-21    140  |  103  |  64<H>  ----------------------------<  123<H>  4.5   |  33<H>  |  1.40<H>    Ca    8.5      21 Feb 2022 09:32    TPro  5.9<L>  /  Alb  2.4<L>  /  TBili  1.2  /  DBili  x   /  AST  46<H>  /  ALT  17  /  AlkPhos  339<H>  02-21      CAPILLARY BLOOD GLUCOSE          Vital Signs Last 24 Hrs  T(C): 37 (21 Feb 2022 12:21), Max: 37.7 (20 Feb 2022 20:33)  T(F): 98.6 (21 Feb 2022 12:21), Max: 99.8 (20 Feb 2022 20:33)  HR: 86 (21 Feb 2022 12:21) (86 - 98)  BP: 93/66 (21 Feb 2022 12:21) (93/66 - 128/87)  BP(mean): --  RR: 18 (21 Feb 2022 12:21) (18 - 18)  SpO2: 92% (21 Feb 2022 12:21) (92% - 96%)                      Allergies    No Known Allergies    Intolerances    pork (Other)      SOCIAL HISTORY:  Denies ETOh,Smoking,     FAMILY HISTORY:  FH: lung cancer        REVIEW OF SYSTEMS:  unable to obtained a good review system                                PHYSICAL EXAM:    Constitutional: NAD  HEENT: conjunctive   clear   Neck:  No JVD  Respiratory: decrease bs b/l   Cardiovascular: S1 and S2  Gastrointestinal: BS+, soft,   Extremities: No peripheral edema    
  Patient is a 61y Male whom presented to the hospital with ckd and ki     PAST MEDICAL & SURGICAL HISTORY:  Heart failure, systolic    CAD (coronary artery disease)    Hypertension    Nonischemic cardiomyopathy    COPD, moderate    2019 novel coronavirus disease (COVID-19)    Substance abuse    HLD (hyperlipidemia)    Cardiac LV ejection fraction 10-20%    AICD (automatic cardioverter/defibrillator) present    Cardiac LV ejection fraction 10-20%        MEDICATIONS  (STANDING):  apixaban 5 milliGRAM(s) Oral every 12 hours  atorvastatin 40 milliGRAM(s) Oral at bedtime  buDESOnide    Inhalation Suspension 0.5 milliGRAM(s) Inhalation two times a day  buMETAnide Injectable 2 milliGRAM(s) IV Push every 12 hours  dexAMETHasone     Tablet 6 milliGRAM(s) Oral daily  gabapentin 100 milliGRAM(s) Oral three times a day  lactobacillus acidophilus 1 Tablet(s) Oral two times a day  pantoprazole    Tablet 40 milliGRAM(s) Oral before breakfast                                            16.8   6.17  )-----------( 189      ( 19 Feb 2022 08:07 )             52.2       CBC Full  -  ( 19 Feb 2022 08:07 )  WBC Count : 6.17 K/uL  RBC Count : 6.29 M/uL  Hemoglobin : 16.8 g/dL  Hematocrit : 52.2 %  Platelet Count - Automated : 189 K/uL  Mean Cell Volume : 83.0 fl  Mean Cell Hemoglobin : 26.7 pg  Mean Cell Hemoglobin Concentration : 32.2 gm/dL  Auto Neutrophil # : x  Auto Lymphocyte # : x  Auto Monocyte # : x  Auto Eosinophil # : x  Auto Basophil # : x  Auto Neutrophil % : x  Auto Lymphocyte % : x  Auto Monocyte % : x  Auto Eosinophil % : x  Auto Basophil % : x      02-19    135  |  96  |  95<H>  ----------------------------<  115<H>  5.7<H>   |  36<H>  |  1.80<H>    Ca    7.9<L>      19 Feb 2022 09:28        CAPILLARY BLOOD GLUCOSE          Vital Signs Last 24 Hrs  T(C): 36.8 (19 Feb 2022 11:40), Max: 36.8 (19 Feb 2022 11:40)  T(F): 98.3 (19 Feb 2022 11:40), Max: 98.3 (19 Feb 2022 11:40)  HR: 80 (19 Feb 2022 11:40) (64 - 80)  BP: 93/59 (19 Feb 2022 11:40) (93/59 - 115/74)  BP(mean): --  RR: 17 (19 Feb 2022 11:40) (16 - 17)  SpO2: 91% (19 Feb 2022 11:40) (91% - 97%)              Allergies    No Known Allergies    Intolerances    pork (Other)      SOCIAL HISTORY:  Denies ETOh,Smoking,     FAMILY HISTORY:  FH: lung cancer        REVIEW OF SYSTEMS:  unable to obtained a good review system                                PHYSICAL EXAM:    Constitutional: NAD  HEENT: conjunctive   clear   Neck:  No JVD  Respiratory: decrease bs b/l   Cardiovascular: S1 and S2  Gastrointestinal: BS+, soft,   Extremities: No peripheral edema    
  Patient is a 61y Male whom presented to the hospital with ckd and ki     PAST MEDICAL & SURGICAL HISTORY:  Heart failure, systolic    CAD (coronary artery disease)    Hypertension    Nonischemic cardiomyopathy    COPD, moderate    2019 novel coronavirus disease (COVID-19)    Substance abuse    HLD (hyperlipidemia)    Cardiac LV ejection fraction 10-20%    AICD (automatic cardioverter/defibrillator) present    Cardiac LV ejection fraction 10-20%        MEDICATIONS  (STANDING):  apixaban 5 milliGRAM(s) Oral every 12 hours  atorvastatin 40 milliGRAM(s) Oral at bedtime  buDESOnide    Inhalation Suspension 0.5 milliGRAM(s) Inhalation two times a day  buMETAnide Injectable 2 milliGRAM(s) IV Push every 12 hours  dexAMETHasone     Tablet 6 milliGRAM(s) Oral daily  gabapentin 100 milliGRAM(s) Oral three times a day  lactobacillus acidophilus 1 Tablet(s) Oral two times a day  pantoprazole    Tablet 40 milliGRAM(s) Oral before breakfast      Allergies    No Known Allergies    Intolerances    pork (Other)      SOCIAL HISTORY:  Denies ETOh,Smoking,     FAMILY HISTORY:  FH: lung cancer        REVIEW OF SYSTEMS:  unable to obtained a good review system                                                                 13.3   9.76  )-----------( 162      ( 15 Feb 2022 07:53 )             42.3       CBC Full  -  ( 15 Feb 2022 07:53 )  WBC Count : 9.76 K/uL  RBC Count : 4.90 M/uL  Hemoglobin : 13.3 g/dL  Hematocrit : 42.3 %  Platelet Count - Automated : 162 K/uL  Mean Cell Volume : 86.3 fl  Mean Cell Hemoglobin : 27.1 pg  Mean Cell Hemoglobin Concentration : 31.4 gm/dL  Auto Neutrophil # : x  Auto Lymphocyte # : x  Auto Monocyte # : x  Auto Eosinophil # : x  Auto Basophil # : x  Auto Neutrophil % : x  Auto Lymphocyte % : x  Auto Monocyte % : x  Auto Eosinophil % : x  Auto Basophil % : x      02-15    139  |  100  |  58<H>  ----------------------------<  161<H>  4.5   |  34<H>  |  1.20    Ca    8.6      15 Feb 2022 07:53        CAPILLARY BLOOD GLUCOSE          Vital Signs Last 24 Hrs  T(C): 36.8 (15 Feb 2022 19:54), Max: 36.8 (15 Feb 2022 19:54)  T(F): 98.2 (15 Feb 2022 19:54), Max: 98.2 (15 Feb 2022 19:54)  HR: 73 (15 Feb 2022 20:03) (63 - 73)  BP: 109/68 (15 Feb 2022 19:54) (107/72 - 120/86)  BP(mean): --  RR: 17 (15 Feb 2022 19:54) (17 - 18)  SpO2: 93% (15 Feb 2022 20:03) (92% - 98%)    Urinalysis Basic - ( 15 Feb 2022 09:54 )    Color: Yellow / Appearance: Clear / S.015 / pH: x  Gluc: x / Ketone: Negative  / Bili: Negative / Urobili: Negative   Blood: x / Protein: 15 / Nitrite: Negative   Leuk Esterase: Negative / RBC: x / WBC 0-2   Sq Epi: x / Non Sq Epi: Occasional / Bacteria: x            PHYSICAL EXAM:    Constitutional: NAD  HEENT: conjunctive   clear   Neck:  No JVD  Respiratory: decrease bs b/l   Cardiovascular: S1 and S2  Gastrointestinal: BS+, soft,   Extremities: No peripheral edema    
Date/Time Patient Seen:  		  Referring MD:   Data Reviewed	       Patient is a 61y old  Male who presents with a chief complaint of SOB (11 Feb 2022 19:13)      Subjective/HPI     PAST MEDICAL & SURGICAL HISTORY:  Heart failure, systolic    CAD (coronary artery disease)    Hypertension    Nonischemic cardiomyopathy    COPD, moderate    2019 novel coronavirus disease (COVID-19)    Substance abuse    HLD (hyperlipidemia)    Cardiac LV ejection fraction 10-20%    No significant past surgical history    AICD (automatic cardioverter/defibrillator) present    Cardiac LV ejection fraction 10-20%          Medication list         MEDICATIONS  (STANDING):  aMIOdarone    Tablet 200 milliGRAM(s) Oral daily  apixaban 5 milliGRAM(s) Oral every 12 hours  aspirin enteric coated 81 milliGRAM(s) Oral daily  atorvastatin 40 milliGRAM(s) Oral at bedtime  buDESOnide    Inhalation Suspension 0.5 milliGRAM(s) Inhalation two times a day  buMETAnide Injectable 2 milliGRAM(s) IV Push every 12 hours  dexAMETHasone     Tablet 6 milliGRAM(s) Oral daily  gabapentin 100 milliGRAM(s) Oral three times a day  lactobacillus acidophilus 1 Tablet(s) Oral two times a day  pantoprazole    Tablet 40 milliGRAM(s) Oral before breakfast  vancomycin  IVPB 1000 milliGRAM(s) IV Intermittent once    MEDICATIONS  (PRN):  acetaminophen     Tablet .. 650 milliGRAM(s) Oral every 6 hours PRN Temp greater or equal to 38C (100.4F), Mild Pain (1 - 3)  aluminum hydroxide/magnesium hydroxide/simethicone Suspension 30 milliLiter(s) Oral every 4 hours PRN Dyspepsia  melatonin 3 milliGRAM(s) Oral at bedtime PRN Insomnia  ondansetron Injectable 4 milliGRAM(s) IV Push every 8 hours PRN Nausea and/or Vomiting  simethicone 80 milliGRAM(s) Chew three times a day PRN Indigestion         Vitals log        ICU Vital Signs Last 24 Hrs  T(C): 37.1 (12 Feb 2022 00:39), Max: 37.6 (11 Feb 2022 21:23)  T(F): 98.8 (12 Feb 2022 00:39), Max: 99.6 (11 Feb 2022 21:23)  HR: 88 (12 Feb 2022 00:39) (73 - 101)  BP: 116/82 (12 Feb 2022 00:39) (102/68 - 118/85)  BP(mean): --  ABP: --  ABP(mean): --  RR: 18 (12 Feb 2022 00:39) (17 - 20)  SpO2: 99% (12 Feb 2022 06:13) (93% - 100%)           Input and Output:  I&O's Detail      Lab Data                        13.6   15.77 )-----------( 173      ( 12 Feb 2022 05:48 )             41.2     02-12    133<L>  |  99  |  58<H>  ----------------------------<  116<H>  5.0   |  28  |  1.60<H>    Ca    8.4<L>      12 Feb 2022 05:48  Phos  4.1     02-12  Mg     2.6     02-12    TPro  5.6<L>  /  Alb  2.3<L>  /  TBili  2.9<H>  /  DBili  x   /  AST  34  /  ALT  42  /  AlkPhos  x   02-12    ABG - ( 11 Feb 2022 20:45 )  pH, Arterial: 7.46  pH, Blood: x     /  pCO2: 34    /  pO2: 78    / HCO3: 24    / Base Excess: 0.4   /  SaO2: 97.3                    Review of Systems	      Objective     Physical Examination    heart s1s2  lung dec BS  abd soft      Pertinent Lab findings & Imaging      Bailey:  NO   Adequate UO     I&O's Detail           Discussed with:     Cultures:	  Culture Results:   Growth in aerobic bottle: Gram Positive Cocci in Clusters (02-11 @ 16:19)  Culture Results:   Growth in aerobic bottle: Gram Positive Cocci in Clusters  ***Blood Panel PCR results on this specimen are available  approximately 3 hours after the Gram stain result.***  Gram stain, PCR, and/or culture results may not always  correspond due to difference in methodologies.  ************************************************************  This PCR assay was performed by multiplex PCR. This  Assay tests for 66 bacterial and resistance gene targets.  Please refer to the [facility] test directory  at [email or web address] for details. (02-11 @ 16:19)        Radiology                            
Date/Time Patient Seen:  		  Referring MD:   Data Reviewed	       Patient is a 61y old  Male who presents with a chief complaint of SOB (13 Feb 2022 14:44)      Subjective/HPI     PAST MEDICAL & SURGICAL HISTORY:  Heart failure, systolic    CAD (coronary artery disease)    Hypertension    Nonischemic cardiomyopathy    COPD, moderate    2019 novel coronavirus disease (COVID-19)    Substance abuse    HLD (hyperlipidemia)    Cardiac LV ejection fraction 10-20%    No significant past surgical history    AICD (automatic cardioverter/defibrillator) present    Cardiac LV ejection fraction 10-20%          Medication list         MEDICATIONS  (STANDING):  aMIOdarone    Tablet 200 milliGRAM(s) Oral daily  apixaban 5 milliGRAM(s) Oral every 12 hours  aspirin enteric coated 81 milliGRAM(s) Oral daily  atorvastatin 40 milliGRAM(s) Oral at bedtime  buDESOnide    Inhalation Suspension 0.5 milliGRAM(s) Inhalation two times a day  buMETAnide Injectable 2 milliGRAM(s) IV Push every 12 hours  ceFAZolin   IVPB 2000 milliGRAM(s) IV Intermittent every 8 hours  dexAMETHasone     Tablet 6 milliGRAM(s) Oral daily  gabapentin 100 milliGRAM(s) Oral three times a day  lactobacillus acidophilus 1 Tablet(s) Oral two times a day  metoprolol succinate ER 25 milliGRAM(s) Oral daily  pantoprazole    Tablet 40 milliGRAM(s) Oral before breakfast    MEDICATIONS  (PRN):  acetaminophen     Tablet .. 650 milliGRAM(s) Oral every 6 hours PRN Temp greater or equal to 38C (100.4F), Mild Pain (1 - 3)  aluminum hydroxide/magnesium hydroxide/simethicone Suspension 30 milliLiter(s) Oral every 4 hours PRN Dyspepsia  melatonin 3 milliGRAM(s) Oral at bedtime PRN Insomnia  ondansetron Injectable 4 milliGRAM(s) IV Push every 8 hours PRN Nausea and/or Vomiting  simethicone 80 milliGRAM(s) Chew three times a day PRN Indigestion         Vitals log        ICU Vital Signs Last 24 Hrs  T(C): 36.4 (14 Feb 2022 06:06), Max: 37.1 (13 Feb 2022 19:06)  T(F): 97.5 (14 Feb 2022 06:06), Max: 98.7 (13 Feb 2022 19:06)  HR: 68 (14 Feb 2022 06:06) (68 - 71)  BP: 111/77 (14 Feb 2022 06:06) (111/77 - 125/88)  BP(mean): --  ABP: --  ABP(mean): --  RR: 18 (14 Feb 2022 06:06) (18 - 21)  SpO2: 99% (14 Feb 2022 06:06) (97% - 100%)           Input and Output:  I&O's Detail    12 Feb 2022 07:01  -  13 Feb 2022 07:00  --------------------------------------------------------  IN:    IV PiggyBack: 50 mL  Total IN: 50 mL    OUT:    Voided (mL): 1200 mL  Total OUT: 1200 mL    Total NET: -1150 mL      13 Feb 2022 07:01  -  14 Feb 2022 06:17  --------------------------------------------------------  IN:    Oral Fluid: 360 mL  Total IN: 360 mL    OUT:    Incontinent per Condom Catheter (mL): 1400 mL  Total OUT: 1400 mL    Total NET: -1040 mL          Lab Data                        12.7   11.78 )-----------( 175      ( 13 Feb 2022 08:21 )             39.4     02-13    134<L>  |  97  |  65<H>  ----------------------------<  162<H>  4.5   |  30  |  1.50<H>    Ca    8.7      13 Feb 2022 08:21              Review of Systems	      Objective     Physical Examination    heart s1s2  lung dc BS  abd soft      Pertinent Lab findings & Imaging      Leo:  NO   Adequate UO     I&O's Detail    12 Feb 2022 07:01  -  13 Feb 2022 07:00  --------------------------------------------------------  IN:    IV PiggyBack: 50 mL  Total IN: 50 mL    OUT:    Voided (mL): 1200 mL  Total OUT: 1200 mL    Total NET: -1150 mL      13 Feb 2022 07:01  -  14 Feb 2022 06:17  --------------------------------------------------------  IN:    Oral Fluid: 360 mL  Total IN: 360 mL    OUT:    Incontinent per Condom Catheter (mL): 1400 mL  Total OUT: 1400 mL    Total NET: -1040 mL               Discussed with:     Cultures:	        Radiology                            
Date/Time Patient Seen:  		  Referring MD:   Data Reviewed	       Patient is a 61y old  Male who presents with a chief complaint of SOB (15 Feb 2022 15:57)      Subjective/HPI     PAST MEDICAL & SURGICAL HISTORY:  Heart failure, systolic    CAD (coronary artery disease)    Hypertension    Nonischemic cardiomyopathy    COPD, moderate    2019 novel coronavirus disease (COVID-19)    Substance abuse    HLD (hyperlipidemia)    Cardiac LV ejection fraction 10-20%    No significant past surgical history    AICD (automatic cardioverter/defibrillator) present    Cardiac LV ejection fraction 10-20%          Medication list         MEDICATIONS  (STANDING):  aMIOdarone    Tablet 200 milliGRAM(s) Oral daily  apixaban 5 milliGRAM(s) Oral every 12 hours  aspirin enteric coated 81 milliGRAM(s) Oral daily  atorvastatin 40 milliGRAM(s) Oral at bedtime  buMETAnide Injectable 2 milliGRAM(s) IV Push every 12 hours  ceFAZolin   IVPB 2000 milliGRAM(s) IV Intermittent every 8 hours  dexAMETHasone     Tablet 6 milliGRAM(s) Oral daily  gabapentin 100 milliGRAM(s) Oral three times a day  lactobacillus acidophilus 1 Tablet(s) Oral two times a day  lidocaine   4% Patch 1 Patch Transdermal daily  metoprolol succinate ER 25 milliGRAM(s) Oral daily  pantoprazole    Tablet 40 milliGRAM(s) Oral before breakfast  sacubitril 24 mG/valsartan 26 mG 1 Tablet(s) Oral two times a day  spironolactone 25 milliGRAM(s) Oral daily    MEDICATIONS  (PRN):  acetaminophen     Tablet .. 650 milliGRAM(s) Oral every 6 hours PRN Temp greater or equal to 38C (100.4F), Mild Pain (1 - 3)  aluminum hydroxide/magnesium hydroxide/simethicone Suspension 30 milliLiter(s) Oral every 4 hours PRN Dyspepsia  melatonin 3 milliGRAM(s) Oral at bedtime PRN Insomnia  ondansetron Injectable 4 milliGRAM(s) IV Push every 8 hours PRN Nausea and/or Vomiting  simethicone 80 milliGRAM(s) Chew three times a day PRN Indigestion         Vitals log        ICU Vital Signs Last 24 Hrs  T(C): 36.4 (16 Feb 2022 05:05), Max: 36.8 (15 Feb 2022 19:54)  T(F): 97.5 (16 Feb 2022 05:05), Max: 98.2 (15 Feb 2022 19:54)  HR: 70 (16 Feb 2022 05:05) (61 - 78)  BP: 103/68 (16 Feb 2022 05:05) (103/68 - 120/86)  BP(mean): --  ABP: --  ABP(mean): --  RR: 17 (16 Feb 2022 05:05) (17 - 18)  SpO2: 92% (16 Feb 2022 05:05) (92% - 98%)           Input and Output:  I&O's Detail    14 Feb 2022 07:01  -  15 Feb 2022 07:00  --------------------------------------------------------  IN:    IV PiggyBack: 100 mL    Oral Fluid: 240 mL  Total IN: 340 mL    OUT:    Incontinent per Condom Catheter (mL): 1275 mL    Voided (mL): 650 mL  Total OUT: 1925 mL    Total NET: -1585 mL      15 Feb 2022 07:01  -  16 Feb 2022 06:19  --------------------------------------------------------  IN:  Total IN: 0 mL    OUT:    Incontinent per Condom Catheter (mL): 600 mL    Voided (mL): 800 mL  Total OUT: 1400 mL    Total NET: -1400 mL          Lab Data                        13.3   9.76  )-----------( 162      ( 15 Feb 2022 07:53 )             42.3     02-15    139  |  100  |  58<H>  ----------------------------<  161<H>  4.5   |  34<H>  |  1.20    Ca    8.6      15 Feb 2022 07:53              Review of Systems	      Objective     Physical Examination    heart s1s2  lung dec BS  abd soft      Pertinent Lab findings & Imaging      Leo:  NO   Adequate UO     I&O's Detail    14 Feb 2022 07:01  -  15 Feb 2022 07:00  --------------------------------------------------------  IN:    IV PiggyBack: 100 mL    Oral Fluid: 240 mL  Total IN: 340 mL    OUT:    Incontinent per Condom Catheter (mL): 1275 mL    Voided (mL): 650 mL  Total OUT: 1925 mL    Total NET: -1585 mL      15 Feb 2022 07:01  -  16 Feb 2022 06:19  --------------------------------------------------------  IN:  Total IN: 0 mL    OUT:    Incontinent per Condom Catheter (mL): 600 mL    Voided (mL): 800 mL  Total OUT: 1400 mL    Total NET: -1400 mL               Discussed with:     Cultures:	        Radiology                            
Date/Time Patient Seen:  		  Referring MD:   Data Reviewed	       Patient is a 61y old  Male who presents with a chief complaint of SOB (22 Feb 2022 14:19)      Subjective/HPI     PAST MEDICAL & SURGICAL HISTORY:  Heart failure, systolic    CAD (coronary artery disease)    Hypertension    Nonischemic cardiomyopathy    COPD, moderate    2019 novel coronavirus disease (COVID-19)    Substance abuse    HLD (hyperlipidemia)    Cardiac LV ejection fraction 10-20%    No significant past surgical history    AICD (automatic cardioverter/defibrillator) present    Cardiac LV ejection fraction 10-20%          Medication list         MEDICATIONS  (STANDING):  aMIOdarone    Tablet 200 milliGRAM(s) Oral daily  apixaban 5 milliGRAM(s) Oral every 12 hours  aspirin enteric coated 81 milliGRAM(s) Oral daily  atorvastatin 40 milliGRAM(s) Oral at bedtime  buMETAnide 2 milliGRAM(s) Oral two times a day  ceFAZolin   IVPB 2000 milliGRAM(s) IV Intermittent every 8 hours  chlorhexidine 4% Liquid 1 Application(s) Topical <User Schedule>  gabapentin 100 milliGRAM(s) Oral three times a day  lactobacillus acidophilus 1 Tablet(s) Oral two times a day  lidocaine   4% Patch 1 Patch Transdermal daily  metoprolol succinate ER 25 milliGRAM(s) Oral daily  midodrine. 5 milliGRAM(s) Oral once  pantoprazole    Tablet 40 milliGRAM(s) Oral before breakfast  spironolactone 25 milliGRAM(s) Oral daily    MEDICATIONS  (PRN):  acetaminophen     Tablet .. 650 milliGRAM(s) Oral every 6 hours PRN Temp greater or equal to 38C (100.4F), Mild Pain (1 - 3)  ALBUTerol    90 MICROgram(s) HFA Inhaler 2 Puff(s) Inhalation every 6 hours PRN Shortness of Breath and/or Wheezing  aluminum hydroxide/magnesium hydroxide/simethicone Suspension 30 milliLiter(s) Oral every 4 hours PRN Dyspepsia  guaiFENesin Oral Liquid (Sugar-Free) 200 milliGRAM(s) Oral every 6 hours PRN Cough  melatonin 3 milliGRAM(s) Oral at bedtime PRN Insomnia  ondansetron Injectable 4 milliGRAM(s) IV Push every 8 hours PRN Nausea and/or Vomiting  simethicone 80 milliGRAM(s) Chew three times a day PRN Indigestion  sodium chloride 0.9% lock flush 10 milliLiter(s) IV Push every 1 hour PRN Pre/post blood products, medications, blood draw, and to maintain line patency         Vitals log        ICU Vital Signs Last 24 Hrs  T(C): 37.2 (23 Feb 2022 05:30), Max: 38.9 (22 Feb 2022 19:13)  T(F): 98.9 (23 Feb 2022 05:30), Max: 102.1 (22 Feb 2022 19:13)  HR: 101 (23 Feb 2022 05:30) (80 - 101)  BP: 102/66 (23 Feb 2022 05:30) (85/56 - 102/66)  BP(mean): --  ABP: --  ABP(mean): --  RR: 20 (23 Feb 2022 05:30) (18 - 20)  SpO2: 95% (23 Feb 2022 05:30) (93% - 98%)           Input and Output:  I&O's Detail    21 Feb 2022 07:01  -  22 Feb 2022 07:00  --------------------------------------------------------  IN:  Total IN: 0 mL    OUT:    Incontinent per Condom Catheter (mL): 800 mL    Voided (mL): 900 mL  Total OUT: 1700 mL    Total NET: -1700 mL      22 Feb 2022 07:01  -  23 Feb 2022 06:16  --------------------------------------------------------  IN:  Total IN: 0 mL    OUT:    Incontinent per Condom Catheter (mL): 1600 mL  Total OUT: 1600 mL    Total NET: -1600 mL          Lab Data                        13.6   5.52  )-----------( 154      ( 22 Feb 2022 07:55 )             43.7     02-22    141  |  104  |  59<H>  ----------------------------<  111<H>  4.4   |  33<H>  |  1.20    Ca    8.2<L>      22 Feb 2022 07:55    TPro  5.3<L>  /  Alb  2.1<L>  /  TBili  0.8  /  DBili  x   /  AST  53<H>  /  ALT  20  /  AlkPhos  414<H>  02-22            Review of Systems	      Objective     Physical Examination    heart s1s2  lung dec BS  abd soft      Pertinent Lab findings & Imaging      Leo:  NO   Adequate UO     I&O's Detail    21 Feb 2022 07:01  -  22 Feb 2022 07:00  --------------------------------------------------------  IN:  Total IN: 0 mL    OUT:    Incontinent per Condom Catheter (mL): 800 mL    Voided (mL): 900 mL  Total OUT: 1700 mL    Total NET: -1700 mL      22 Feb 2022 07:01  -  23 Feb 2022 06:16  --------------------------------------------------------  IN:  Total IN: 0 mL    OUT:    Incontinent per Condom Catheter (mL): 1600 mL  Total OUT: 1600 mL    Total NET: -1600 mL               Discussed with:     Cultures:	        Radiology                            
PROGRESS NOTE  Patient is a 61y old  Male who presents with a chief complaint of SOB (2022 14:19)    Chart and available morning labs /imaging are reviewed electronically , urgent issues addressed . More information  is being added upon completion of rounds , when more information is collected and management discussed with consultants , medical staff and social service/case management on the floor   OVERNIGHT  No new issues reported by medical staff . All above noted Patient is resting in a bed comfortably .Confused ,poor mentation .No distress noted   Remains on hi flow   HPI:  61 yr old male ,known to me from [facility]  with med hx of  CAD, dilated cardiomyopathy, s/p ICD, atrial flutter on Eliquis, substance abuse was admitted initially from  for decompensated CHF, was on Milrinone and Bumex drips, left AMA on  and returned to the ED later in the day as he had to take care of personal matter. He reported shortness of breath and right arm and foot pain on presentation. Cardiology and Heart Failure specialist were consulted, he was placed on oral Bumex., subsequently transitioned to Bumex gtt. Bumex drip stopped on . Stopped  Milrinone  transitioned to oral Torsemide , metoprolol . Counseling given re diet and medication compliance .Not candidate for advanced therapies due to smoking/cocaine use history and non-compliance. Patient reported he was recently evicted from his apartment and is homeless, social work consult placed. Seen by PT and [facility]. The patient was medically stable for discharge.  Subsequently pt developed COVID-19 infection was admitted to [facility] and discharged a few days ago, now returns to ER from rehab  because of increasing sob and fevers .Found to have positive COVID test ,seen by ID & pulm consult requested Palliative care consult requested ,to discuss advance directives and complete MOLST  (2022 13:41)    PAST MEDICAL & SURGICAL HISTORY:  Heart failure, systolic    CAD (coronary artery disease)    Hypertension    Nonischemic cardiomyopathy    COPD, moderate    2019 novel coronavirus disease (COVID-19)    Substance abuse    HLD (hyperlipidemia)    Cardiac LV ejection fraction 10-20%    AICD (automatic cardioverter/defibrillator) present    Cardiac LV ejection fraction 10-20%        MEDICATIONS  (STANDING):  aMIOdarone    Tablet 200 milliGRAM(s) Oral daily  apixaban 5 milliGRAM(s) Oral every 12 hours  aspirin enteric coated 81 milliGRAM(s) Oral daily  atorvastatin 40 milliGRAM(s) Oral at bedtime  buMETAnide Injectable 2 milliGRAM(s) IV Push every 12 hours  ceFAZolin   IVPB 2000 milliGRAM(s) IV Intermittent every 8 hours  dexAMETHasone     Tablet 6 milliGRAM(s) Oral daily  gabapentin 100 milliGRAM(s) Oral three times a day  lactobacillus acidophilus 1 Tablet(s) Oral two times a day  lidocaine   4% Patch 1 Patch Transdermal daily  metoprolol succinate ER 25 milliGRAM(s) Oral daily  pantoprazole    Tablet 40 milliGRAM(s) Oral before breakfast  sacubitril 24 mG/valsartan 26 mG 1 Tablet(s) Oral two times a day  spironolactone 25 milliGRAM(s) Oral daily    MEDICATIONS  (PRN):  acetaminophen     Tablet .. 650 milliGRAM(s) Oral every 6 hours PRN Temp greater or equal to 38C (100.4F), Mild Pain (1 - 3)  ALBUTerol    90 MICROgram(s) HFA Inhaler 2 Puff(s) Inhalation every 6 hours PRN Shortness of Breath and/or Wheezing  aluminum hydroxide/magnesium hydroxide/simethicone Suspension 30 milliLiter(s) Oral every 4 hours PRN Dyspepsia  guaiFENesin Oral Liquid (Sugar-Free) 200 milliGRAM(s) Oral every 6 hours PRN Cough  melatonin 3 milliGRAM(s) Oral at bedtime PRN Insomnia  ondansetron Injectable 4 milliGRAM(s) IV Push every 8 hours PRN Nausea and/or Vomiting  simethicone 80 milliGRAM(s) Chew three times a day PRN Indigestion      OBJECTIVE    T(C): 36.8 (22 @ 11:40), Max: 36.8 (02-15-22 @ 19:54)  HR: 67 (22 @ 11:40) (61 - 78)  BP: 105/73 (22 @ 11:40) (103/68 - 120/86)  RR: 18 (22 @ 11:40) (17 - 18)  SpO2: 98% (22 @ 11:40) (92% - 98%)  Wt(kg): --  I&O's Summary    15 Feb 2022 07:01  -  2022 07:00  --------------------------------------------------------  IN: 0 mL / OUT: 1400 mL / NET: -1400 mL    2022 07:01  -  2022 16:09  --------------------------------------------------------  IN: 0 mL / OUT: 1500 mL / NET: -1500 mL          REVIEW OF SYSTEMS:  CONSTITUTIONAL: No fever, weight loss, or fatigue  EYES: No eye pain, visual disturbances, or discharge  ENMT:   No sinus or throat pain  NECK: No pain or stiffness  RESPIRATORY: No cough, wheezing, chills or hemoptysis; No shortness of breath  CARDIOVASCULAR: No chest pain, palpitations, dizziness, or leg swelling  GASTROINTESTINAL: No abdominal pain. No nausea, vomiting; No diarrhea or constipation. No melena or hematochezia.  GENITOURINARY: No dysuria, frequency, hematuria, or incontinence  NEUROLOGICAL: No headaches, memory loss, loss of strength, numbness, or tremors  SKIN: No itching, burning, rashes, or lesions   MUSCULOSKELETAL: No joint pain or swelling; No muscle, back, or extremity pain    PHYSICAL EXAM:  Appearance: NAD. VS past 24 hrs -as above   HEENT:   Moist oral mucosa. Conjunctiva clear b/l.   Neck : supple  Respiratory: Lungs CTAB.  Gastrointestinal:  Soft, nontender. No rebound. No rigidity. BS present	  Cardiovascular: RRR ,S1S2 present  Neurologic: Non-focal. Moving all extremities.  Extremities: No edema. No erythema. No calf tenderness.  Skin: No rashes, No ecchymoses, No cyanosis.	  wounds ,skin lesions-See skin assesment flow sheet   LABS:                        13.8   9.90  )-----------( 193      ( 2022 07:27 )             43.7     02-16    138  |  101  |  54<H>  ----------------------------<  135<H>  4.9   |  31  |  1.10    Ca    7.8<L>      2022 07:27      CAPILLARY BLOOD GLUCOSE          Urinalysis Basic - ( 15 Feb 2022 09:54 )    Color: Yellow / Appearance: Clear / S.015 / pH: x  Gluc: x / Ketone: Negative  / Bili: Negative / Urobili: Negative   Blood: x / Protein: 15 / Nitrite: Negative   Leuk Esterase: Negative / RBC: x / WBC 0-2   Sq Epi: x / Non Sq Epi: Occasional / Bacteria: x        Culture - Urine (collected 15 Feb 2022 16:24)  Source: Clean Catch Clean Catch (Midstream)  Final Report (2022 13:24):    No growth    Culture - Blood (collected 2022 12:17)  Source: .Blood Blood-Peripheral  Preliminary Report (15 Feb 2022 13:02):    No growth to date.    Culture - Blood (collected 2022 12:06)  Source: .Blood Blood-Peripheral  Gram Stain (2022 08:12):    Growth in aerobic bottle: Gram Positive Cocci in Clusters    Growth in anaerobic bottle: Gram Positive Cocci in Clusters  Final Report (2022 07:16):    Growth in aerobic and anaerobic bottles: Staphylococcus aureus    See previous culture [id number]    Culture - Blood (collected 2022 12:06)  Source: .Blood Blood-Peripheral  Gram Stain (2022 08:15):    Growth in aerobic bottle: Gram Positive Cocci in Clusters    Growth in anaerobic bottle: Gram Positive Cocci in Clusters  Final Report (2022 07:15):    Growth in aerobic and anaerobic bottles: Staphylococcus aureus    See previous culture [id number]    Culture - Blood (collected 2022 16:19)  Source: .Blood Blood-Peripheral  Gram Stain (2022 07:00):    Growth in aerobic bottle: Gram Positive Cocci in Clusters    Growth in anaerobic bottle: Gram Positive Cocci in Clusters  Final Report (2022 07:13):    Growth in aerobic and anaerobic bottles: Staphylococcus aureus    ***Blood Panel PCR results on this specimen are available    approximately 3 hours after the Gram stain result.***    Gram stain, PCR, and/or culture results may not always    correspond dueto difference in methodologies.    ************************************************************    This PCR assay was performed by multiplex PCR. This    Assay tests for 66 bacterial and resistance gene targets.    Please refer to the [facility] test directory    at [email or web address] for details.  Organism: Blood Culture PCR  Staphylococcus aureus (2022 07:13)  Organism: Staphylococcus aureus (2022 07:13)  Organism: Blood Culture PCR (2022 07:13)    Culture - Blood (collected 2022 16:19)  Source: .Blood Blood-Peripheral  Gram Stain (2022 06:59):    Growth in aerobic bottle: Gram Positive Cocci in Clusters    Growth in anaerobic bottle: Gram Positive Cocci in Clusters  Final Report (2022 07:13):    Growth in aerobic and anaerobic bottles: Staphylococcus aureus    See previous culture [id number]      RADIOLOGY & ADDITIONAL TESTS:   reviewed elctronically  ASSESSMENT/PLAN: 	  25 minutes aggregate time was spent on this visit, 50% visit time spent in care co-ordination with other attendings and counselling patient .I have discussed care plan with patient / HCP/family member ,who expressed understanding of problems treatment and their effect and side effects, alternatives in details. I have asked if they have any questions and concerns and appropriately addressed them to best of my ability.   
PROGRESS NOTE  Patient is a 61y old  Male who presents with a chief complaint of SOB (20 Feb 2022 10:12)  Chart and available morning labs /imaging are reviewed electronically , urgent issues addressed . More information  is being added upon completion of rounds , when more information is collected and management discussed with consultants , medical staff and social service/case management on the floor     OVERNIGHT  No new issues reported by medical staff . All above noted Patient is resting in a bed comfortably . .No distress noted     HPI:  61 yr old male ,known to me from [facility]  with med hx of  CAD, dilated cardiomyopathy, s/p ICD, atrial flutter on Eliquis, substance abuse was admitted initially from 12/24 for decompensated CHF, was on Milrinone and Bumex drips, left AMA on 12/31 and returned to the ED later in the day as he had to take care of personal matter. He reported shortness of breath and right arm and foot pain on presentation. Cardiology and Heart Failure specialist were consulted, he was placed on oral Bumex., subsequently transitioned to Bumex gtt. Bumex drip stopped on 1/12. Stopped  Milrinone 1/14 transitioned to oral Torsemide , metoprolol . Counseling given re diet and medication compliance .Not candidate for advanced therapies due to smoking/cocaine use history and non-compliance. Patient reported he was recently evicted from his apartment and is homeless, social work consult placed. Seen by PT and recommend  [facility]. The patient was medically stable for discharge.  Subsequently pt developed COVID-19 infection was admitted to [facility] and discharged a few days ago, now returns to ER from rehab  because of increasing sob and fevers .Found to have positive COVID test ,seen by ID & pulm consult requested Palliative care consult requested ,to discuss advance directives and complete MOLST  (11 Feb 2022 13:41)    PAST MEDICAL & SURGICAL HISTORY:  Heart failure, systolic    CAD (coronary artery disease)    Hypertension    Nonischemic cardiomyopathy    COPD, moderate    2019 novel coronavirus disease (COVID-19)    Substance abuse    HLD (hyperlipidemia)    Cardiac LV ejection fraction 10-20%    AICD (automatic cardioverter/defibrillator) present    Cardiac LV ejection fraction 10-20%        MEDICATIONS  (STANDING):  aMIOdarone    Tablet 200 milliGRAM(s) Oral daily  apixaban 5 milliGRAM(s) Oral every 12 hours  aspirin enteric coated 81 milliGRAM(s) Oral daily  atorvastatin 40 milliGRAM(s) Oral at bedtime  buMETAnide 2 milliGRAM(s) Oral two times a day  ceFAZolin   IVPB 2000 milliGRAM(s) IV Intermittent every 8 hours  chlorhexidine 4% Liquid 1 Application(s) Topical <User Schedule>  gabapentin 100 milliGRAM(s) Oral three times a day  lactobacillus acidophilus 1 Tablet(s) Oral two times a day  lidocaine   4% Patch 1 Patch Transdermal daily  metoprolol succinate ER 25 milliGRAM(s) Oral daily  pantoprazole    Tablet 40 milliGRAM(s) Oral before breakfast  sacubitril 24 mG/valsartan 26 mG 1 Tablet(s) Oral two times a day  spironolactone 25 milliGRAM(s) Oral daily    MEDICATIONS  (PRN):  acetaminophen     Tablet .. 650 milliGRAM(s) Oral every 6 hours PRN Temp greater or equal to 38C (100.4F), Mild Pain (1 - 3)  ALBUTerol    90 MICROgram(s) HFA Inhaler 2 Puff(s) Inhalation every 6 hours PRN Shortness of Breath and/or Wheezing  aluminum hydroxide/magnesium hydroxide/simethicone Suspension 30 milliLiter(s) Oral every 4 hours PRN Dyspepsia  guaiFENesin Oral Liquid (Sugar-Free) 200 milliGRAM(s) Oral every 6 hours PRN Cough  melatonin 3 milliGRAM(s) Oral at bedtime PRN Insomnia  ondansetron Injectable 4 milliGRAM(s) IV Push every 8 hours PRN Nausea and/or Vomiting  simethicone 80 milliGRAM(s) Chew three times a day PRN Indigestion  sodium chloride 0.9% lock flush 10 milliLiter(s) IV Push every 1 hour PRN Pre/post blood products, medications, blood draw, and to maintain line patency      OBJECTIVE    T(C): 37.3 (02-20-22 @ 11:16), Max: 37.3 (02-20-22 @ 11:16)  HR: 93 (02-20-22 @ 11:16) (80 - 93)  BP: 79/54 (02-20-22 @ 11:16) (79/54 - 94/58)  RR: 17 (02-20-22 @ 11:16) (17 - 18)  SpO2: 93% (02-20-22 @ 11:16) (91% - 95%)  Wt(kg): --  I&O's Summary    19 Feb 2022 07:01  -  20 Feb 2022 07:00  --------------------------------------------------------  IN: 0 mL / OUT: 1100 mL / NET: -1100 mL    20 Feb 2022 07:01  -  20 Feb 2022 12:29  --------------------------------------------------------  IN: 0 mL / OUT: 400 mL / NET: -400 mL          REVIEW OF SYSTEMS:  CONSTITUTIONAL: No fever, weight loss, or fatigue  EYES: No eye pain, visual disturbances, or discharge  ENMT:   No sinus or throat pain  NECK: No pain or stiffness  RESPIRATORY: No cough, wheezing, chills or hemoptysis; No shortness of breath  CARDIOVASCULAR: No chest pain, palpitations, dizziness, or leg swelling  GASTROINTESTINAL: No abdominal pain. No nausea, vomiting; No diarrhea or constipation. No melena or hematochezia.  GENITOURINARY: No dysuria, frequency, hematuria, or incontinence  NEUROLOGICAL: No headaches, memory loss, loss of strength, numbness, or tremors  SKIN: No itching, burning, rashes, or lesions   MUSCULOSKELETAL: No joint pain or swelling; No muscle, back, or extremity pain    PHYSICAL EXAM:  Appearance: NAD. VS past 24 hrs -as above   HEENT:   Moist oral mucosa. Conjunctiva clear b/l.   Neck : supple  Respiratory: Lungs CTAB.  Gastrointestinal:  Soft, nontender. No rebound. No rigidity. BS present	  Cardiovascular: RRR ,S1S2 present  Neurologic: Non-focal. Moving all extremities.  Extremities: No edema. No erythema. No calf tenderness.  Skin: No rashes, No ecchymoses, No cyanosis.	  wounds ,skin lesions-See skin assesment flow sheet   LABS:                        16.8   6.17  )-----------( 189      ( 19 Feb 2022 08:07 )             52.2     02-19    135  |  96  |  95<H>  ----------------------------<  115<H>  5.7<H>   |  36<H>  |  1.80<H>    Ca    7.9<L>      19 Feb 2022 09:28      CAPILLARY BLOOD GLUCOSE              Culture - Urine (collected 15 Feb 2022 16:24)  Source: Clean Catch Clean Catch (Midstream)  Final Report (16 Feb 2022 13:24):    No growth    Culture - Blood (collected 14 Feb 2022 12:17)  Source: .Blood Blood-Peripheral  Final Report (19 Feb 2022 13:00):    No Growth Final    Culture - Blood (collected 12 Feb 2022 12:06)  Source: .Blood Blood-Peripheral  Gram Stain (13 Feb 2022 08:12):    Growth in aerobic bottle: Gram Positive Cocci in Clusters    Growth in anaerobic bottle: Gram Positive Cocci in Clusters  Final Report (14 Feb 2022 07:16):    Growth in aerobic and anaerobic bottles: Staphylococcus aureus    See previous culture [id number]    Culture - Blood (collected 12 Feb 2022 12:06)  Source: .Blood Blood-Peripheral  Gram Stain (13 Feb 2022 08:15):    Growth in aerobic bottle: Gram Positive Cocci in Clusters    Growth in anaerobic bottle: Gram Positive Cocci in Clusters  Final Report (14 Feb 2022 07:15):    Growth in aerobic and anaerobic bottles: Staphylococcus aureus    See previous culture [id number]    Culture - Blood (collected 11 Feb 2022 16:19)  Source: .Blood Blood-Peripheral  Gram Stain (12 Feb 2022 07:00):    Growth in aerobic bottle: Gram Positive Cocci in Clusters    Growth in anaerobic bottle: Gram Positive Cocci in Clusters  Final Report (14 Feb 2022 07:13):    Growth in aerobic and anaerobic bottles: Staphylococcus aureus    ***Blood Panel PCR results on this specimen are available    approximately 3 hours after the Gram stain result.***    Gram stain, PCR, and/or culture results may not always    correspond dueto difference in methodologies.    ************************************************************    This PCR assay was performed by multiplex PCR. This    Assay tests for 66 bacterial and resistance gene targets.    Please refer to the [facility] test directory    at [email or web address] for details.  Organism: Blood Culture PCR  Staphylococcus aureus (14 Feb 2022 07:13)  Organism: Staphylococcus aureus (14 Feb 2022 07:13)  Organism: Blood Culture PCR (14 Feb 2022 07:13)    Culture - Blood (collected 11 Feb 2022 16:19)  Source: .Blood Blood-Peripheral  Gram Stain (12 Feb 2022 06:59):    Growth in aerobic bottle: Gram Positive Cocci in Clusters    Growth in anaerobic bottle: Gram Positive Cocci in Clusters  Final Report (14 Feb 2022 07:13):    Growth in aerobic and anaerobic bottles: Staphylococcus aureus    See previous culture [id number]      RADIOLOGY & ADDITIONAL TESTS:   reviewed elctronically  ASSESSMENT/PLAN: 	  25 minutes aggregate time was spent on this visit, 50% visit time spent in care co-ordination with other attendings and counselling patient .I have discussed care plan with patient / HCP/family member ,who expressed understanding of problems treatment and their effect and side effects, alternatives in details. I have asked if they have any questions and concerns and appropriately addressed them to best of my ability.   
PROGRESS NOTE  Patient is a 61y old  Male who presents with a chief complaint of SOB (14 Feb 2022 14:38)    Chart and available morning labs /imaging are reviewed electronically , urgent issues addressed . More information  is being added upon completion of rounds , when more information is collected and management discussed with consultants , medical staff and social service/case management on the floor   OVERNIGHT  No new issues reported by medical staff . All above noted Patient is resting in a bed comfortably .Feels much better ,oxygen is being taspered by pulm Dr Goins ,d/w RT  .No distress noted     HPI:  61 yr old male ,known to me from [facility]  with med hx of  CAD, dilated cardiomyopathy, s/p ICD, atrial flutter on Eliquis, substance abuse was admitted initially from 12/24 for decompensated CHF, was on Milrinone and Bumex drips, left AMA on 12/31 and returned to the ED later in the day as he had to take care of personal matter. He reported shortness of breath and right arm and foot pain on presentation. Cardiology and Heart Failure specialist were consulted, he was placed on oral Bumex., subsequently transitioned to Bumex gtt. Bumex drip stopped on 1/12. Stopped  Milrinone 1/14 transitioned to oral Torsemide , metoprolol . Counseling given re diet and medication compliance .Not candidate for advanced therapies due to smoking/cocaine use history and non-compliance. Patient reported he was recently evicted from his apartment and is homeless, social work consult placed. Seen by PT and recommend  [facility]. The patient was medically stable for discharge.  Subsequently pt developed COVID-19 infection was admitted to [facility] and discharged a few days ago, now returns to ER from rehab  because of increasing sob and fevers .Found to have positive COVID test ,seen by ID & pulm consult requested Palliative care consult requested ,to discuss advance directives and complete MOLST  (11 Feb 2022 13:41)    PAST MEDICAL & SURGICAL HISTORY:  Heart failure, systolic    CAD (coronary artery disease)    Hypertension    Nonischemic cardiomyopathy    COPD, moderate    2019 novel coronavirus disease (COVID-19)    Substance abuse    HLD (hyperlipidemia)    Cardiac LV ejection fraction 10-20%    AICD (automatic cardioverter/defibrillator) present    Cardiac LV ejection fraction 10-20%        MEDICATIONS  (STANDING):  aMIOdarone    Tablet 200 milliGRAM(s) Oral daily  apixaban 5 milliGRAM(s) Oral every 12 hours  aspirin enteric coated 81 milliGRAM(s) Oral daily  atorvastatin 40 milliGRAM(s) Oral at bedtime  buMETAnide Injectable 2 milliGRAM(s) IV Push every 12 hours  ceFAZolin   IVPB 2000 milliGRAM(s) IV Intermittent every 8 hours  dexAMETHasone     Tablet 6 milliGRAM(s) Oral daily  gabapentin 100 milliGRAM(s) Oral three times a day  lactobacillus acidophilus 1 Tablet(s) Oral two times a day  lidocaine   4% Patch 1 Patch Transdermal daily  metoprolol succinate ER 25 milliGRAM(s) Oral daily  pantoprazole    Tablet 40 milliGRAM(s) Oral before breakfast  sacubitril 24 mG/valsartan 26 mG 1 Tablet(s) Oral two times a day  spironolactone 25 milliGRAM(s) Oral daily    MEDICATIONS  (PRN):  acetaminophen     Tablet .. 650 milliGRAM(s) Oral every 6 hours PRN Temp greater or equal to 38C (100.4F), Mild Pain (1 - 3)  aluminum hydroxide/magnesium hydroxide/simethicone Suspension 30 milliLiter(s) Oral every 4 hours PRN Dyspepsia  melatonin 3 milliGRAM(s) Oral at bedtime PRN Insomnia  ondansetron Injectable 4 milliGRAM(s) IV Push every 8 hours PRN Nausea and/or Vomiting  simethicone 80 milliGRAM(s) Chew three times a day PRN Indigestion      OBJECTIVE    T(C): 36.5 (02-14-22 @ 11:45), Max: 37.1 (02-13-22 @ 19:06)  HR: 64 (02-14-22 @ 11:45) (63 - 70)  BP: 116/75 (02-14-22 @ 11:45) (111/77 - 116/83)  RR: 19 (02-14-22 @ 11:45) (18 - 21)  SpO2: 97% (02-14-22 @ 11:45) (96% - 99%)  Wt(kg): --  I&O's Summary    13 Feb 2022 07:01  -  14 Feb 2022 07:00  --------------------------------------------------------  IN: 360 mL / OUT: 1750 mL / NET: -1390 mL    14 Feb 2022 07:01  -  14 Feb 2022 17:05  --------------------------------------------------------  IN: 240 mL / OUT: 1275 mL / NET: -1035 mL          REVIEW OF SYSTEMS:  CONSTITUTIONAL: No fever, weight loss, or fatigue  EYES: No eye pain, visual disturbances, or discharge  ENMT:   No sinus or throat pain  NECK: No pain or stiffness  RESPIRATORY: No cough, wheezing, chills or hemoptysis; No shortness of breath  CARDIOVASCULAR: No chest pain, palpitations, dizziness, or leg swelling  GASTROINTESTINAL: No abdominal pain. No nausea, vomiting; No diarrhea or constipation. No melena or hematochezia.  GENITOURINARY: No dysuria, frequency, hematuria, or incontinence  NEUROLOGICAL: No headaches, memory loss, loss of strength, numbness, or tremors  SKIN: No itching, burning, rashes, or lesions   MUSCULOSKELETAL: No joint pain or swelling; No muscle, back, or extremity pain    PHYSICAL EXAM:  Appearance: NAD. VS past 24 hrs -as above   HEENT:   Moist oral mucosa. Conjunctiva clear b/l.   Neck : supple  Respiratory: Lungs CTAB.  Gastrointestinal:  Soft, nontender. No rebound. No rigidity. BS present	  Cardiovascular: RRR ,S1S2 present  Neurologic: Non-focal. Moving all extremities.  Extremities: No edema. No erythema. No calf tenderness.  Skin: No rashes, No ecchymoses, No cyanosis.	  wounds ,skin lesions-See skin assesment flow sheet   LABS:                        13.7   9.75  )-----------( 180      ( 14 Feb 2022 07:48 )             44.3     02-14    135  |  98  |  71<H>  ----------------------------<  130<H>  4.8   |  32<H>  |  1.40<H>    Ca    9.0      14 Feb 2022 07:48      CAPILLARY BLOOD GLUCOSE              Culture - Blood (collected 12 Feb 2022 12:06)  Source: .Blood Blood-Peripheral  Gram Stain (13 Feb 2022 08:12):    Growth in aerobic bottle: Gram Positive Cocci in Clusters    Growth in anaerobic bottle: Gram Positive Cocci in Clusters  Final Report (14 Feb 2022 07:16):    Growth in aerobic and anaerobic bottles: Staphylococcus aureus    See previous culture [id number]    Culture - Blood (collected 12 Feb 2022 12:06)  Source: .Blood Blood-Peripheral  Gram Stain (13 Feb 2022 08:15):    Growth in aerobic bottle: Gram Positive Cocci in Clusters    Growth in anaerobic bottle: Gram Positive Cocci in Clusters  Final Report (14 Feb 2022 07:15):    Growth in aerobic and anaerobic bottles: Staphylococcus aureus    See previous culture [id number]    Culture - Blood (collected 11 Feb 2022 16:19)  Source: .Blood Blood-Peripheral  Gram Stain (12 Feb 2022 07:00):    Growth in aerobic bottle: Gram Positive Cocci in Clusters    Growth in anaerobic bottle: Gram Positive Cocci in Clusters  Final Report (14 Feb 2022 07:13):    Growth in aerobic and anaerobic bottles: Staphylococcus aureus    ***Blood Panel PCR results on this specimen are available    approximately 3 hours after the Gram stain result.***    Gram stain, PCR, and/or culture results may not always    correspond dueto difference in methodologies.    ************************************************************    This PCR assay was performed by multiplex PCR. This    Assay tests for 66 bacterial and resistance gene targets.    Please refer to the [facility] test directory    at [email or web address] for details.  Organism: Blood Culture PCR  Staphylococcus aureus (14 Feb 2022 07:13)  Organism: Staphylococcus aureus (14 Feb 2022 07:13)  Organism: Blood Culture PCR (14 Feb 2022 07:13)    Culture - Blood (collected 11 Feb 2022 16:19)  Source: .Blood Blood-Peripheral  Gram Stain (12 Feb 2022 06:59):    Growth in aerobic bottle: Gram Positive Cocci in Clusters    Growth in anaerobic bottle: Gram Positive Cocci in Clusters  Final Report (14 Feb 2022 07:13):    Growth in aerobic and anaerobic bottles: Staphylococcus aureus    See previous culture [id number]      RADIOLOGY & ADDITIONAL TESTS:   reviewed elctronically  ASSESSMENT/PLAN: 	  25 minutes aggregate time was spent on this visit, 50% visit time spent in care co-ordination with other attendings and counselling patient .I have discussed care plan with patient / HCP/family member ,who expressed understanding of problems treatment and their effect and side effects, alternatives in details. I have asked if they have any questions and concerns and appropriately addressed them to best of my ability.   
PROGRESS NOTE  Patient is a 61y old  Male who presents with a chief complaint of SOB (15 Feb 2022 15:57)    Chart and available morning labs /imaging are reviewed electronically , urgent issues addressed . More information  is being added upon completion of rounds , when more information is collected and management discussed with consultants , medical staff and social service/case management on the floor   OVERNIGHT  No new issues reported by medical staff . All above noted Patient is resting in a bed comfortably .Confused ,poor mentation .No distress noted     HPI:  61 yr old male ,known to me from [facility]  with med hx of  CAD, dilated cardiomyopathy, s/p ICD, atrial flutter on Eliquis, substance abuse was admitted initially from  for decompensated CHF, was on Milrinone and Bumex drips, left AMA on  and returned to the ED later in the day as he had to take care of personal matter. He reported shortness of breath and right arm and foot pain on presentation. Cardiology and Heart Failure specialist were consulted, he was placed on oral Bumex., subsequently transitioned to Bumex gtt. Bumex drip stopped on . Stopped  Milrinone  transitioned to oral Torsemide , metoprolol . Counseling given re diet and medication compliance .Not candidate for advanced therapies due to smoking/cocaine use history and non-compliance. Patient reported he was recently evicted from his apartment and is homeless, social work consult placed. Seen by PT and recommend  [facility]. The patient was medically stable for discharge.  Subsequently pt developed COVID-19 infection was admitted to [facility] and discharged a few days ago, now returns to ER from rehab  because of increasing sob and fevers .Found to have positive COVID test ,seen by ID & pulm consult requested Palliative care consult requested ,to discuss advance directives and complete MOLST  (2022 13:41)    PAST MEDICAL & SURGICAL HISTORY:  Heart failure, systolic    CAD (coronary artery disease)    Hypertension    Nonischemic cardiomyopathy    COPD, moderate     novel coronavirus disease (COVID-19)    Substance abuse    HLD (hyperlipidemia)    Cardiac LV ejection fraction 10-20%    AICD (automatic cardioverter/defibrillator) present    Cardiac LV ejection fraction 10-20%        MEDICATIONS  (STANDING):  aMIOdarone    Tablet 200 milliGRAM(s) Oral daily  apixaban 5 milliGRAM(s) Oral every 12 hours  aspirin enteric coated 81 milliGRAM(s) Oral daily  atorvastatin 40 milliGRAM(s) Oral at bedtime  buMETAnide Injectable 2 milliGRAM(s) IV Push every 12 hours  ceFAZolin   IVPB 2000 milliGRAM(s) IV Intermittent every 8 hours  dexAMETHasone     Tablet 6 milliGRAM(s) Oral daily  gabapentin 100 milliGRAM(s) Oral three times a day  lactobacillus acidophilus 1 Tablet(s) Oral two times a day  lidocaine   4% Patch 1 Patch Transdermal daily  metoprolol succinate ER 25 milliGRAM(s) Oral daily  pantoprazole    Tablet 40 milliGRAM(s) Oral before breakfast  sacubitril 24 mG/valsartan 26 mG 1 Tablet(s) Oral two times a day  spironolactone 25 milliGRAM(s) Oral daily    MEDICATIONS  (PRN):  acetaminophen     Tablet .. 650 milliGRAM(s) Oral every 6 hours PRN Temp greater or equal to 38C (100.4F), Mild Pain (1 - 3)  aluminum hydroxide/magnesium hydroxide/simethicone Suspension 30 milliLiter(s) Oral every 4 hours PRN Dyspepsia  melatonin 3 milliGRAM(s) Oral at bedtime PRN Insomnia  ondansetron Injectable 4 milliGRAM(s) IV Push every 8 hours PRN Nausea and/or Vomiting  simethicone 80 milliGRAM(s) Chew three times a day PRN Indigestion      OBJECTIVE    T(C): 36.5 (02-15-22 @ 13:58), Max: 36.6 (02-15-22 @ 03:44)  HR: 70 (02-15-22 @ 17:33) (63 - 70)  BP: 120/86 (02-15-22 @ 17:33) (107/72 - 120/86)  RR: 18 (02-15-22 @ 17:33) (17 - 18)  SpO2: 94% (02-15-22 @ 17:33) (93% - 98%)  Wt(kg): --  I&O's Summary    2022 07:01  -  15 Feb 2022 07:00  --------------------------------------------------------  IN: 340 mL / OUT: 1925 mL / NET: -1585 mL    15 Feb 2022 07:01  -  15 Feb 2022 19:36  --------------------------------------------------------  IN: 0 mL / OUT: 800 mL / NET: -800 mL          REVIEW OF SYSTEMS:  CONSTITUTIONAL: No fever, weight loss, or fatigue  EYES: No eye pain, visual disturbances, or discharge  ENMT:   No sinus or throat pain  NECK: No pain or stiffness  RESPIRATORY: No cough, wheezing, chills or hemoptysis; No shortness of breath  CARDIOVASCULAR: No chest pain, palpitations, dizziness, or leg swelling  GASTROINTESTINAL: No abdominal pain. No nausea, vomiting; No diarrhea or constipation. No melena or hematochezia.  GENITOURINARY: No dysuria, frequency, hematuria, or incontinence  NEUROLOGICAL: No headaches, memory loss, loss of strength, numbness, or tremors  SKIN: No itching, burning, rashes, or lesions   MUSCULOSKELETAL: No joint pain or swelling; No muscle, back, or extremity pain    PHYSICAL EXAM:  Appearance: NAD. VS past 24 hrs -as above   HEENT:   Moist oral mucosa. Conjunctiva clear b/l.   Neck : supple  Respiratory: Lungs CTAB.  Gastrointestinal:  Soft, nontender. No rebound. No rigidity. BS present	  Cardiovascular: RRR ,S1S2 present  Neurologic: Non-focal. Moving all extremities.  Extremities: No edema. No erythema. No calf tenderness.  Skin: No rashes, No ecchymoses, No cyanosis.	  wounds ,skin lesions-See skin assesment flow sheet   LABS:                        13.3   9.76  )-----------( 162      ( 15 Feb 2022 07:53 )             42.3     02-15    139  |  100  |  58<H>  ----------------------------<  161<H>  4.5   |  34<H>  |  1.20    Ca    8.6      15 Feb 2022 07:53      CAPILLARY BLOOD GLUCOSE          Urinalysis Basic - ( 15 Feb 2022 09:54 )    Color: Yellow / Appearance: Clear / S.015 / pH: x  Gluc: x / Ketone: Negative  / Bili: Negative / Urobili: Negative   Blood: x / Protein: 15 / Nitrite: Negative   Leuk Esterase: Negative / RBC: x / WBC 0-2   Sq Epi: x / Non Sq Epi: Occasional / Bacteria: x        Culture - Blood (collected 2022 12:17)  Source: .Blood Blood-Peripheral  Preliminary Report (15 Feb 2022 13:02):    No growth to date.    Culture - Blood (collected 2022 12:06)  Source: .Blood Blood-Peripheral  Gram Stain (2022 08:12):    Growth in aerobic bottle: Gram Positive Cocci in Clusters    Growth in anaerobic bottle: Gram Positive Cocci in Clusters  Final Report (2022 07:16):    Growth in aerobic and anaerobic bottles: Staphylococcus aureus    See previous culture [id number]    Culture - Blood (collected 2022 12:06)  Source: .Blood Blood-Peripheral  Gram Stain (2022 08:15):    Growth in aerobic bottle: Gram Positive Cocci in Clusters    Growth in anaerobic bottle: Gram Positive Cocci in Clusters  Final Report (2022 07:15):    Growth in aerobic and anaerobic bottles: Staphylococcus aureus    See previous culture [id number]    Culture - Blood (collected 2022 16:19)  Source: .Blood Blood-Peripheral  Gram Stain (2022 07:00):    Growth in aerobic bottle: Gram Positive Cocci in Clusters    Growth in anaerobic bottle: Gram Positive Cocci in Clusters  Final Report (2022 07:13):    Growth in aerobic and anaerobic bottles: Staphylococcus aureus    ***Blood Panel PCR results on this specimen are available    approximately 3 hours after the Gram stain result.***    Gram stain, PCR, and/or culture results may not always    correspond dueto difference in methodologies.    ************************************************************    This PCR assay was performed by multiplex PCR. This    Assay tests for 66 bacterial and resistance gene targets.    Please refer to the [facility] test directory    at [email or web address] for details.  Organism: Blood Culture PCR  Staphylococcus aureus (2022 07:13)  Organism: Staphylococcus aureus (2022 07:13)  Organism: Blood Culture PCR (2022 07:13)    Culture - Blood (collected 2022 16:19)  Source: .Blood Blood-Peripheral  Gram Stain (2022 06:59):    Growth in aerobic bottle: Gram Positive Cocci in Clusters    Growth in anaerobic bottle: Gram Positive Cocci in Clusters  Final Report (2022 07:13):    Growth in aerobic and anaerobic bottles: Staphylococcus aureus    See previous culture [id number]      RADIOLOGY & ADDITIONAL TESTS:   reviewed elctronically  ASSESSMENT/PLAN: 	    
PROGRESS NOTE  Patient is a 61y old  Male who presents with a chief complaint of SOB (17 Feb 2022 17:36)    Chart and available morning labs /imaging are reviewed electronically , urgent issues addressed . More information  is being added upon completion of rounds , when more information is collected and management discussed with consultants , medical staff and social service/case management on the floor   OVERNIGHT  No new issues reported by medical staff . All above noted Patient is resting in a bed comfortably .On nasal cannula  .No distress noted     HPI:  61 yr old male ,known to me from [facility]  with med hx of  CAD, dilated cardiomyopathy, s/p ICD, atrial flutter on Eliquis, substance abuse was admitted initially from 12/24 for decompensated CHF, was on Milrinone and Bumex drips, left AMA on 12/31 and returned to the ED later in the day as he had to take care of personal matter. He reported shortness of breath and right arm and foot pain on presentation. Cardiology and Heart Failure specialist were consulted, he was placed on oral Bumex., subsequently transitioned to Bumex gtt. Bumex drip stopped on 1/12. Stopped  Milrinone 1/14 transitioned to oral Torsemide , metoprolol . Counseling given re diet and medication compliance .Not candidate for advanced therapies due to smoking/cocaine use history and non-compliance. Patient reported he was recently evicted from his apartment and is homeless, social work consult placed. Seen by PT and recommend  [facility]. The patient was medically stable for discharge.  Subsequently pt developed COVID-19 infection was admitted to [facility] and discharged a few days ago, now returns to ER from rehab  because of increasing sob and fevers .Found to have positive COVID test ,seen by ID & pulm consult requested Palliative care consult requested ,to discuss advance directives and complete MOLST  (11 Feb 2022 13:41)    PAST MEDICAL & SURGICAL HISTORY:  Heart failure, systolic    CAD (coronary artery disease)    Hypertension    Nonischemic cardiomyopathy    COPD, moderate    2019 novel coronavirus disease (COVID-19)    Substance abuse    HLD (hyperlipidemia)    Cardiac LV ejection fraction 10-20%    AICD (automatic cardioverter/defibrillator) present    Cardiac LV ejection fraction 10-20%        MEDICATIONS  (STANDING):  aMIOdarone    Tablet 200 milliGRAM(s) Oral daily  apixaban 5 milliGRAM(s) Oral every 12 hours  aspirin enteric coated 81 milliGRAM(s) Oral daily  atorvastatin 40 milliGRAM(s) Oral at bedtime  buMETAnide 2 milliGRAM(s) Oral two times a day  ceFAZolin   IVPB 2000 milliGRAM(s) IV Intermittent every 8 hours  gabapentin 100 milliGRAM(s) Oral three times a day  lactobacillus acidophilus 1 Tablet(s) Oral two times a day  lidocaine   4% Patch 1 Patch Transdermal daily  metoprolol succinate ER 25 milliGRAM(s) Oral daily  pantoprazole    Tablet 40 milliGRAM(s) Oral before breakfast  sacubitril 24 mG/valsartan 26 mG 1 Tablet(s) Oral two times a day  spironolactone 25 milliGRAM(s) Oral daily    MEDICATIONS  (PRN):  acetaminophen     Tablet .. 650 milliGRAM(s) Oral every 6 hours PRN Temp greater or equal to 38C (100.4F), Mild Pain (1 - 3)  ALBUTerol    90 MICROgram(s) HFA Inhaler 2 Puff(s) Inhalation every 6 hours PRN Shortness of Breath and/or Wheezing  aluminum hydroxide/magnesium hydroxide/simethicone Suspension 30 milliLiter(s) Oral every 4 hours PRN Dyspepsia  guaiFENesin Oral Liquid (Sugar-Free) 200 milliGRAM(s) Oral every 6 hours PRN Cough  melatonin 3 milliGRAM(s) Oral at bedtime PRN Insomnia  ondansetron Injectable 4 milliGRAM(s) IV Push every 8 hours PRN Nausea and/or Vomiting  simethicone 80 milliGRAM(s) Chew three times a day PRN Indigestion      OBJECTIVE    T(C): 36.6 (02-17-22 @ 19:03), Max: 36.8 (02-17-22 @ 10:55)  HR: 61 (02-17-22 @ 19:03) (61 - 67)  BP: 108/67 (02-17-22 @ 19:03) (99/64 - 108/73)  RR: 17 (02-17-22 @ 19:03) (17 - 18)  SpO2: 97% (02-17-22 @ 19:03) (95% - 98%)  Wt(kg): --  I&O's Summary    16 Feb 2022 07:01  -  17 Feb 2022 07:00  --------------------------------------------------------  IN: 0 mL / OUT: 2850 mL / NET: -2850 mL          REVIEW OF SYSTEMS:  CONSTITUTIONAL: No fever, weight loss, or fatigue  EYES: No eye pain, visual disturbances, or discharge  ENMT:   No sinus or throat pain  NECK: No pain or stiffness  RESPIRATORY: No cough, wheezing, chills or hemoptysis; No shortness of breath  CARDIOVASCULAR: No chest pain, palpitations, dizziness, or leg swelling  GASTROINTESTINAL: No abdominal pain. No nausea, vomiting; No diarrhea or constipation. No melena or hematochezia.  GENITOURINARY: No dysuria, frequency, hematuria, or incontinence  NEUROLOGICAL: No headaches, memory loss, loss of strength, numbness, or tremors  SKIN: No itching, burning, rashes, or lesions   MUSCULOSKELETAL: No joint pain or swelling; No muscle, back, or extremity pain    PHYSICAL EXAM:  Appearance: NAD. VS past 24 hrs -as above   HEENT:   Moist oral mucosa. Conjunctiva clear b/l.   Neck : supple  Respiratory: Lungs CTAB.  Gastrointestinal:  Soft, nontender. No rebound. No rigidity. BS present	  Cardiovascular: RRR ,S1S2 present  Neurologic: Non-focal. Moving all extremities.  Extremities: No edema. No erythema. No calf tenderness.  Skin: No rashes, No ecchymoses, No cyanosis.	  wounds ,skin lesions-See skin assesment flow sheet   LABS:                        14.9   9.70  )-----------( 206      ( 17 Feb 2022 07:27 )             47.2     02-17    134<L>  |  97  |  68<H>  ----------------------------<  213<H>  4.7   |  29  |  1.40<H>    Ca    7.9<L>      17 Feb 2022 07:27      CAPILLARY BLOOD GLUCOSE              Culture - Urine (collected 15 Feb 2022 16:24)  Source: Clean Catch Clean Catch (Midstream)  Final Report (16 Feb 2022 13:24):    No growth    Culture - Blood (collected 14 Feb 2022 12:17)  Source: .Blood Blood-Peripheral  Preliminary Report (15 Feb 2022 13:02):    No growth to date.    Culture - Blood (collected 12 Feb 2022 12:06)  Source: .Blood Blood-Peripheral  Gram Stain (13 Feb 2022 08:12):    Growth in aerobic bottle: Gram Positive Cocci in Clusters    Growth in anaerobic bottle: Gram Positive Cocci in Clusters  Final Report (14 Feb 2022 07:16):    Growth in aerobic and anaerobic bottles: Staphylococcus aureus    See previous culture [id number]    Culture - Blood (collected 12 Feb 2022 12:06)  Source: .Blood Blood-Peripheral  Gram Stain (13 Feb 2022 08:15):    Growth in aerobic bottle: Gram Positive Cocci in Clusters    Growth in anaerobic bottle: Gram Positive Cocci in Clusters  Final Report (14 Feb 2022 07:15):    Growth in aerobic and anaerobic bottles: Staphylococcus aureus    See previous culture [id number]    Culture - Blood (collected 11 Feb 2022 16:19)  Source: .Blood Blood-Peripheral  Gram Stain (12 Feb 2022 07:00):    Growth in aerobic bottle: Gram Positive Cocci in Clusters    Growth in anaerobic bottle: Gram Positive Cocci in Clusters  Final Report (14 Feb 2022 07:13):    Growth in aerobic and anaerobic bottles: Staphylococcus aureus    ***Blood Panel PCR results on this specimen are available    approximately 3 hours after the Gram stain result.***    Gram stain, PCR, and/or culture results may not always    correspond dueto difference in methodologies.    ************************************************************    This PCR assay was performed by multiplex PCR. This    Assay tests for 66 bacterial and resistance gene targets.    Please refer to the [facility] test directory    at [email or web address] for details.  Organism: Blood Culture PCR  Staphylococcus aureus (14 Feb 2022 07:13)  Organism: Staphylococcus aureus (14 Feb 2022 07:13)  Organism: Blood Culture PCR (14 Feb 2022 07:13)    Culture - Blood (collected 11 Feb 2022 16:19)  Source: .Blood Blood-Peripheral  Gram Stain (12 Feb 2022 06:59):    Growth in aerobic bottle: Gram Positive Cocci in Clusters    Growth in anaerobic bottle: Gram Positive Cocci in Clusters  Final Report (14 Feb 2022 07:13):    Growth in aerobic and anaerobic bottles: Staphylococcus aureus    See previous culture [id number]      RADIOLOGY & ADDITIONAL TESTS:   reviewed elctronically  ASSESSMENT/PLAN: 	  25 minutes aggregate time was spent on this visit, 50% visit time spent in care co-ordination with other attendings and counselling patient .I have discussed care plan with patient / HCP/family member ,who expressed understanding of problems treatment and their effect and side effects, alternatives in details. I have asked if they have any questions and concerns and appropriately addressed them to best of my ability.   
PROGRESS NOTE  Patient is a 61y old  Male who presents with a chief complaint of SOB (12 Feb 2022 10:08)  Chart and available morning labs /imaging are reviewed electronically , urgent issues addressed . More information  is being added upon completion of rounds , when more information is collected and management discussed with consultants , medical staff and social service/case management on the floor   OVERNIGHT  Bacteremia  reported by medical staff ,ID is informed  . All above noted Patient is resting in a bed comfortably .Remains on hi flow oxygen  .No distress noted   HPI:  61 yr old male ,known to me from [facility]  with med hx of  CAD, dilated cardiomyopathy, s/p ICD, atrial flutter on Eliquis, substance abuse was admitted initially from 12/24 for decompensated CHF, was on Milrinone and Bumex drips, left AMA on 12/31 and returned to the ED later in the day as he had to take care of personal matter. He reported shortness of breath and right arm and foot pain on presentation. Cardiology and Heart Failure specialist were consulted, he was placed on oral Bumex., subsequently transitioned to Bumex gtt. Bumex drip stopped on 1/12. Stopped  Milrinone 1/14 transitioned to oral Torsemide , metoprolol . Counseling given re diet and medication compliance .Not candidate for advanced therapies due to smoking/cocaine use history and non-compliance. Patient reported he was recently evicted from his apartment and is homeless, social work consult placed. Seen by PT and recommend  [facility]. The patient was medically stable for discharge.  Subsequently pt developed COVID-19 infection was admitted to [facility] and discharged a few days ago, now returns to ER from rehab  because of increasing sob and fevers .Found to have positive COVID test ,seen by ID & pulm consult requested Palliative care consult requested ,to discuss advance directives and complete [facility]  (11 Feb 2022 13:41)    PAST MEDICAL & SURGICAL HISTORY:  Heart failure, systolic    CAD (coronary artery disease)    Hypertension    Nonischemic cardiomyopathy    COPD, moderate    2019 novel coronavirus disease (COVID-19)    Substance abuse    Cardiac LV ejection fraction 10-20%    HLD (hyperlipidemia)    AICD (automatic cardioverter/defibrillator) present    Cardiac LV ejection fraction 10-20%        MEDICATIONS  (STANDING):  aMIOdarone    Tablet 200 milliGRAM(s) Oral daily  apixaban 5 milliGRAM(s) Oral every 12 hours  aspirin enteric coated 81 milliGRAM(s) Oral daily  atorvastatin 40 milliGRAM(s) Oral at bedtime  buDESOnide    Inhalation Suspension 0.5 milliGRAM(s) Inhalation two times a day  buMETAnide Injectable 2 milliGRAM(s) IV Push every 12 hours  dexAMETHasone     Tablet 6 milliGRAM(s) Oral daily  gabapentin 100 milliGRAM(s) Oral three times a day  lactobacillus acidophilus 1 Tablet(s) Oral two times a day  metoprolol succinate ER 25 milliGRAM(s) Oral daily  pantoprazole    Tablet 40 milliGRAM(s) Oral before breakfast    MEDICATIONS  (PRN):  acetaminophen     Tablet .. 650 milliGRAM(s) Oral every 6 hours PRN Temp greater or equal to 38C (100.4F), Mild Pain (1 - 3)  aluminum hydroxide/magnesium hydroxide/simethicone Suspension 30 milliLiter(s) Oral every 4 hours PRN Dyspepsia  melatonin 3 milliGRAM(s) Oral at bedtime PRN Insomnia  ondansetron Injectable 4 milliGRAM(s) IV Push every 8 hours PRN Nausea and/or Vomiting  simethicone 80 milliGRAM(s) Chew three times a day PRN Indigestion      OBJECTIVE    T(C): 36.7 (02-12-22 @ 11:23), Max: 37.6 (02-11-22 @ 21:23)  HR: 76 (02-12-22 @ 12:56) (73 - 97)  BP: 121/88 (02-12-22 @ 11:23) (116/82 - 146/81)  RR: 25 (02-12-22 @ 11:23) (17 - 25)  SpO2: 97% (02-12-22 @ 12:56) (95% - 100%)  Wt(kg): --  I&O's Summary    11 Feb 2022 07:01  -  12 Feb 2022 07:00  --------------------------------------------------------  IN: 0 mL / OUT: 200 mL / NET: -200 mL          REVIEW OF SYSTEMS:  CONSTITUTIONAL: No fever, weight loss, or fatigue  EYES: No eye pain, visual disturbances, or discharge  ENMT:   No sinus or throat pain  NECK: No pain or stiffness  RESPIRATORY: No cough, wheezing, chills or hemoptysis; No shortness of breath  CARDIOVASCULAR: No chest pain, palpitations, dizziness, or leg swelling  GASTROINTESTINAL: No abdominal pain. No nausea, vomiting; No diarrhea or constipation. No melena or hematochezia.  GENITOURINARY: No dysuria, frequency, hematuria, or incontinence  NEUROLOGICAL: No headaches, memory loss, loss of strength, numbness, or tremors  SKIN: No itching, burning, rashes, or lesions   MUSCULOSKELETAL: No joint pain or swelling; No muscle, back, or extremity pain    PHYSICAL EXAM:  Appearance: NAD. VS past 24 hrs -as above   HEENT:   Moist oral mucosa. Conjunctiva clear b/l.   Neck : supple  Respiratory: Lungs CTAB.  Gastrointestinal:  Soft, nontender. No rebound. No rigidity. BS present	ascites   Cardiovascular: RRR ,S1S2 present  Neurologic: Non-focal. Moving all extremities.  Extremities: b/l  edema. No erythema. No calf tenderness.  Skin: No rashes, No ecchymoses, No cyanosis.	  wounds ,skin lesions-See skin assesment flow sheet   LABS:                        13.6   15.77 )-----------( 173      ( 12 Feb 2022 05:48 )             41.2     02-12    133<L>  |  99  |  58<H>  ----------------------------<  116<H>  5.0   |  28  |  1.60<H>    Ca    8.4<L>      12 Feb 2022 05:48  Phos  4.1     02-12  Mg     2.6     02-12    TPro  5.6<L>  /  Alb  2.3<L>  /  TBili  2.9<H>  /  DBili  x   /  AST  34  /  ALT  42  /  AlkPhos  293<H>  02-12    CAPILLARY BLOOD GLUCOSE        PT/INR - ( 12 Feb 2022 05:48 )   PT: 30.0 sec;   INR: 2.69 ratio         PTT - ( 11 Feb 2022 10:10 )  PTT:38.7 sec      Culture - Blood (collected 11 Feb 2022 16:19)  Source: .Blood Blood-Peripheral  Gram Stain (12 Feb 2022 07:00):    Growth in aerobic bottle: Gram Positive Cocci in Clusters    Growth in anaerobic bottle: Gram Positive Cocci in Clusters  Preliminary Report (12 Feb 2022 07:00):    Growth in aerobic bottle: Gram Positive Cocci in Clusters    Growth in anaerobic bottle: Gram Positive Cocci in Clusters    ***Blood Panel PCR results on this specimen are available    approximately 3 hours after the Gram stain result.***    Gram stain, PCR, and/or culture results may not always    correspond due to difference in methodologies.    ************************************************************    This PCR assay was performed by multiplex PCR. This    Assay tests for 66 bacterial and resistance gene targets.    Please refer to the [facility] test directory    at [email or web address] for details.  Organism: Blood Culture PCR (12 Feb 2022 05:44)  Organism: Blood Culture PCR (12 Feb 2022 05:44)    Culture - Blood (collected 11 Feb 2022 16:19)  Source: .Blood Blood-Peripheral  Gram Stain (12 Feb 2022 06:59):    Growth in aerobic bottle: Gram Positive Cocci in Clusters    Growth in anaerobic bottle: Gram Positive Cocci in Clusters  Preliminary Report (12 Feb 2022 06:59):    Growth in aerobic bottle: Gram Positive Cocci in Clusters    Growth in anaerobic bottle: Gram Positive Cocci in Clusters      RADIOLOGY & ADDITIONAL TESTS: < from: Xray Chest 1 View-PORTABLE IMMEDIATE (02.11.22 @ 10:05) >  ACC: [id number] EXAM:  XR CHEST PORTABLE IMMED 1V                          PROCEDURE DATE:  02/11/2022          INTERPRETATION:  Portable chest radiograph    CLINICAL INFORMATION: Dyspnea, shortness of breath.    TECHNIQUE:  Portable  AP chest radiograph.    COMPARISON: 1/14/2022 chest .    FINDINGS:  CATHETERS AND TUBES: None    PULMONARY: Unchanged moderate RIGHT pleural effusion and/or basilar   airspace disease obscuring diaphragm contour. RIGHT upper zone and LEFT   lung parenchyma clear..  No pneumothorax.    HEART/VASCULAR: The  heart is grossly enlarged in transverse diameter. No   hilar mass.  Cardiac device wire lead is within right ventricle. .    BONES: Visualized osseous structures are intact.    IMPRESSION:   No interval change..    < end of copied text >     reviewed elctronically  ASSESSMENT/PLAN: 	    25 minutes aggregate time was spent on this visit, 50% visit time spent in care co-ordination with other attendings and counselling patient .I have discussed care plan with patient / HCP/family member ,who expressed understanding of problems treatment and their effect and side effects, alternatives in details. I have asked if they have any questions and concerns and appropriately addressed them to best of my ability. 
PROGRESS NOTE  Patient is a 61y old  Male who presents with a chief complaint of SOB (23 Feb 2022 12:34)    Chart and available morning labs /imaging are reviewed electronically , urgent issues addressed . More information  is being added upon completion of rounds , when more information is collected and management discussed with consultants , medical staff and social service/case management on the floor   OVERNIGHT  No new issues reported by medical staff . All above noted Patient is resting in a bed comfortably . .No distress noted   Had temp 102.1 ,septic workup ordered ,refused blood cx   HPI:  61 yr old male ,known to me from [facility]  with med hx of  CAD, dilated cardiomyopathy, s/p ICD, atrial flutter on Eliquis, substance abuse was admitted initially from 12/24 for decompensated CHF, was on Milrinone and Bumex drips, left AMA on 12/31 and returned to the ED later in the day as he had to take care of personal matter. He reported shortness of breath and right arm and foot pain on presentation. Cardiology and Heart Failure specialist were consulted, he was placed on oral Bumex., subsequently transitioned to Bumex gtt. Bumex drip stopped on 1/12. Stopped  Milrinone 1/14 transitioned to oral Torsemide , metoprolol . Counseling given re diet and medication compliance .Not candidate for advanced therapies due to smoking/cocaine use history and non-compliance. Patient reported he was recently evicted from his apartment and is homeless, social work consult placed. Seen by PT and recommend  [facility]. The patient was medically stable for discharge.  Subsequently pt developed COVID-19 infection was admitted to [facility] and discharged a few days ago, now returns to ER from rehab  because of increasing sob and fevers .Found to have positive COVID test ,seen by ID & pulm consult requested Palliative care consult requested ,to discuss advance directives and complete MOLST  (11 Feb 2022 13:41)    PAST MEDICAL & SURGICAL HISTORY:  Heart failure, systolic    CAD (coronary artery disease)    Hypertension    Nonischemic cardiomyopathy    COPD, moderate    2019 novel coronavirus disease (COVID-19)    Substance abuse    HLD (hyperlipidemia)    Cardiac LV ejection fraction 10-20%    AICD (automatic cardioverter/defibrillator) present    Cardiac LV ejection fraction 10-20%        MEDICATIONS  (STANDING):  aMIOdarone    Tablet 200 milliGRAM(s) Oral daily  apixaban 5 milliGRAM(s) Oral every 12 hours  aspirin enteric coated 81 milliGRAM(s) Oral daily  atorvastatin 40 milliGRAM(s) Oral at bedtime  buMETAnide 2 milliGRAM(s) Oral two times a day  ceFAZolin   IVPB 2000 milliGRAM(s) IV Intermittent every 8 hours  chlorhexidine 4% Liquid 1 Application(s) Topical <User Schedule>  gabapentin 100 milliGRAM(s) Oral three times a day  lactobacillus acidophilus 1 Tablet(s) Oral two times a day  lidocaine   4% Patch 1 Patch Transdermal daily  metoprolol succinate ER 25 milliGRAM(s) Oral daily  midodrine. 5 milliGRAM(s) Oral once  pantoprazole    Tablet 40 milliGRAM(s) Oral before breakfast  spironolactone 25 milliGRAM(s) Oral daily    MEDICATIONS  (PRN):  acetaminophen     Tablet .. 650 milliGRAM(s) Oral every 6 hours PRN Temp greater or equal to 38C (100.4F), Mild Pain (1 - 3)  ALBUTerol    90 MICROgram(s) HFA Inhaler 2 Puff(s) Inhalation every 6 hours PRN Shortness of Breath and/or Wheezing  aluminum hydroxide/magnesium hydroxide/simethicone Suspension 30 milliLiter(s) Oral every 4 hours PRN Dyspepsia  guaiFENesin Oral Liquid (Sugar-Free) 200 milliGRAM(s) Oral every 6 hours PRN Cough  melatonin 3 milliGRAM(s) Oral at bedtime PRN Insomnia  ondansetron Injectable 4 milliGRAM(s) IV Push every 8 hours PRN Nausea and/or Vomiting  simethicone 80 milliGRAM(s) Chew three times a day PRN Indigestion  sodium chloride 0.9% lock flush 10 milliLiter(s) IV Push every 1 hour PRN Pre/post blood products, medications, blood draw, and to maintain line patency      OBJECTIVE    T(C): 37 (02-23-22 @ 12:10), Max: 38.9 (02-22-22 @ 19:13)  HR: 94 (02-23-22 @ 12:10) (94 - 101)  BP: 93/65 (02-23-22 @ 12:10) (85/56 - 102/66)  RR: 18 (02-23-22 @ 12:10) (18 - 20)  SpO2: 92% (02-23-22 @ 12:10) (92% - 95%)  Wt(kg): --  I&O's Summary    22 Feb 2022 07:01  -  23 Feb 2022 07:00  --------------------------------------------------------  IN: 0 mL / OUT: 1600 mL / NET: -1600 mL    23 Feb 2022 07:01  -  23 Feb 2022 18:04  --------------------------------------------------------  IN: 0 mL / OUT: 2300 mL / NET: -2300 mL          REVIEW OF SYSTEMS:  CONSTITUTIONAL: No fever, weight loss, or fatigue  EYES: No eye pain, visual disturbances, or discharge  ENMT:   No sinus or throat pain  NECK: No pain or stiffness  RESPIRATORY: No cough, wheezing, chills or hemoptysis; No shortness of breath  CARDIOVASCULAR: No chest pain, palpitations, dizziness, or leg swelling  GASTROINTESTINAL: No abdominal pain. No nausea, vomiting; No diarrhea or constipation. No melena or hematochezia.  GENITOURINARY: No dysuria, frequency, hematuria, or incontinence  NEUROLOGICAL: No headaches, memory loss, loss of strength, numbness, or tremors  SKIN: No itching, burning, rashes, or lesions   MUSCULOSKELETAL: No joint pain or swelling; No muscle, back, or extremity pain    PHYSICAL EXAM:  Appearance: NAD. VS past 24 hrs -as above   HEENT:   Moist oral mucosa. Conjunctiva clear b/l.   Neck : supple  Respiratory: Lungs CTAB.  Gastrointestinal:  Soft, nontender. No rebound. No rigidity. BS present	  Cardiovascular: RRR ,S1S2 present  Neurologic: Non-focal. Moving all extremities.  Extremities: No edema. No erythema. No calf tenderness.  Skin: No rashes, No ecchymoses, No cyanosis.	  wounds ,skin lesions-See skin assesment flow sheet   LABS:                        13.6   5.52  )-----------( 154      ( 22 Feb 2022 07:55 )             43.7     02-22    141  |  104  |  59<H>  ----------------------------<  111<H>  4.4   |  33<H>  |  1.20    Ca    8.2<L>      22 Feb 2022 07:55    TPro  5.3<L>  /  Alb  2.1<L>  /  TBili  0.8  /  DBili  x   /  AST  53<H>  /  ALT  20  /  AlkPhos  414<H>  02-22    CAPILLARY BLOOD GLUCOSE              Culture - Blood (collected 22 Feb 2022 06:35)  Source: .Blood Blood  Preliminary Report (23 Feb 2022 07:01):    No growth to date.    Culture - Blood (collected 22 Feb 2022 06:35)  Source: .Blood Blood  Preliminary Report (23 Feb 2022 07:01):    No growth to date.    Culture - Urine (collected 15 Feb 2022 16:24)  Source: Clean Catch Clean Catch (Midstream)  Final Report (16 Feb 2022 13:24):    No growth    Culture - Blood (collected 14 Feb 2022 12:17)  Source: .Blood Blood-Peripheral  Final Report (19 Feb 2022 13:00):    No Growth Final      RADIOLOGY & ADDITIONAL TESTS:   reviewed elctronically  ASSESSMENT/PLAN: 	    25 minutes aggregate time was spent on this visit, 50% visit time spent in care co-ordination with other attendings and counselling patient .I have discussed care plan with patient / HCP/family member ,who expressed understanding of problems treatment and their effect and side effects, alternatives in details. I have asked if they have any questions and concerns and appropriately addressed them to best of my ability. 
PROGRESS NOTE  Patient is a 61y old  Male who presents with a chief complaint of SOB (18 Feb 2022 14:56)    Chart and available morning labs /imaging are reviewed electronically , urgent issues addressed . More information  is being added upon completion of rounds , when more information is collected and management discussed with consultants , medical staff and social service/case management on the floor   OVERNIGHT  No new issues reported by medical staff . All above noted Patient is resting in a bed comfortably .Refused PICC LINE ,me and ID cons spoke to the patient but he keeps refusing it " I dont want anything under my skin " .No distress noted ,d/c planned in 24 hrs ,awairing for authorization from medicaid     HPI:  61 yr old male ,known to me from [facility]  with med hx of  CAD, dilated cardiomyopathy, s/p ICD, atrial flutter on Eliquis, substance abuse was admitted initially from 12/24 for decompensated CHF, was on Milrinone and Bumex drips, left AMA on 12/31 and returned to the ED later in the day as he had to take care of personal matter. He reported shortness of breath and right arm and foot pain on presentation. Cardiology and Heart Failure specialist were consulted, he was placed on oral Bumex., subsequently transitioned to Bumex gtt. Bumex drip stopped on 1/12. Stopped  Milrinone 1/14 transitioned to oral Torsemide , metoprolol . Counseling given re diet and medication compliance .Not candidate for advanced therapies due to smoking/cocaine use history and non-compliance. Patient reported he was recently evicted from his apartment and is homeless, social work consult placed. Seen by PT and recommend  [facility]. The patient was medically stable for discharge.  Subsequently pt developed COVID-19 infection was admitted to [facility] and discharged a few days ago, now returns to ER from rehab  because of increasing sob and fevers .Found to have positive COVID test ,seen by ID & pulm consult requested Palliative care consult requested ,to discuss advance directives and complete MOLST  (11 Feb 2022 13:41)    PAST MEDICAL & SURGICAL HISTORY:  Heart failure, systolic    CAD (coronary artery disease)    Hypertension    Nonischemic cardiomyopathy    COPD, moderate    2019 novel coronavirus disease (COVID-19)    Substance abuse    HLD (hyperlipidemia)    Cardiac LV ejection fraction 10-20%    AICD (automatic cardioverter/defibrillator) present    Cardiac LV ejection fraction 10-20%        MEDICATIONS  (STANDING):  aMIOdarone    Tablet 200 milliGRAM(s) Oral daily  apixaban 5 milliGRAM(s) Oral every 12 hours  aspirin enteric coated 81 milliGRAM(s) Oral daily  atorvastatin 40 milliGRAM(s) Oral at bedtime  buMETAnide 2 milliGRAM(s) Oral two times a day  ceFAZolin   IVPB 2000 milliGRAM(s) IV Intermittent every 8 hours  chlorhexidine 4% Liquid 1 Application(s) Topical <User Schedule>  gabapentin 100 milliGRAM(s) Oral three times a day  lactobacillus acidophilus 1 Tablet(s) Oral two times a day  lidocaine   4% Patch 1 Patch Transdermal daily  metoprolol succinate ER 25 milliGRAM(s) Oral daily  pantoprazole    Tablet 40 milliGRAM(s) Oral before breakfast  sacubitril 24 mG/valsartan 26 mG 1 Tablet(s) Oral two times a day  spironolactone 25 milliGRAM(s) Oral daily    MEDICATIONS  (PRN):  acetaminophen     Tablet .. 650 milliGRAM(s) Oral every 6 hours PRN Temp greater or equal to 38C (100.4F), Mild Pain (1 - 3)  ALBUTerol    90 MICROgram(s) HFA Inhaler 2 Puff(s) Inhalation every 6 hours PRN Shortness of Breath and/or Wheezing  aluminum hydroxide/magnesium hydroxide/simethicone Suspension 30 milliLiter(s) Oral every 4 hours PRN Dyspepsia  guaiFENesin Oral Liquid (Sugar-Free) 200 milliGRAM(s) Oral every 6 hours PRN Cough  melatonin 3 milliGRAM(s) Oral at bedtime PRN Insomnia  ondansetron Injectable 4 milliGRAM(s) IV Push every 8 hours PRN Nausea and/or Vomiting  simethicone 80 milliGRAM(s) Chew three times a day PRN Indigestion  sodium chloride 0.9% lock flush 10 milliLiter(s) IV Push every 1 hour PRN Pre/post blood products, medications, blood draw, and to maintain line patency      OBJECTIVE    T(C): 36.6 (02-18-22 @ 11:51), Max: 36.6 (02-18-22 @ 11:51)  HR: 64 (02-18-22 @ 18:41) (64 - 71)  BP: 104/75 (02-18-22 @ 18:41) (104/75 - 114/79)  RR: 17 (02-18-22 @ 18:41) (17 - 18)  SpO2: 97% (02-18-22 @ 18:41) (95% - 97%)  Wt(kg): --  I&O's Summary    17 Feb 2022 07:01  -  18 Feb 2022 07:00  --------------------------------------------------------  IN: 0 mL / OUT: 850 mL / NET: -850 mL    18 Feb 2022 07:01  -  18 Feb 2022 19:05  --------------------------------------------------------  IN: 0 mL / OUT: 600 mL / NET: -600 mL          REVIEW OF SYSTEMS:  CONSTITUTIONAL: No fever, weight loss, or fatigue  EYES: No eye pain, visual disturbances, or discharge  ENMT:   No sinus or throat pain  NECK: No pain or stiffness  RESPIRATORY: No cough, wheezing, chills or hemoptysis; No shortness of breath  CARDIOVASCULAR: No chest pain, palpitations, dizziness, or leg swelling  GASTROINTESTINAL: No abdominal pain. No nausea, vomiting; No diarrhea or constipation. No melena or hematochezia.  GENITOURINARY: No dysuria, frequency, hematuria, or incontinence  NEUROLOGICAL: No headaches, memory loss, loss of strength, numbness, or tremors  SKIN: No itching, burning, rashes, or lesions   MUSCULOSKELETAL: No joint pain or swelling; No muscle, back, or extremity pain    PHYSICAL EXAM:  Appearance: NAD. VS past 24 hrs -as above   HEENT:   Moist oral mucosa. Conjunctiva clear b/l.   Neck : supple  Respiratory: Lungs CTAB.  Gastrointestinal:  Soft, nontender. No rebound. No rigidity. BS present	  Cardiovascular: RRR ,S1S2 present  Neurologic: Non-focal. Moving all extremities.  Extremities: No edema. No erythema. No calf tenderness.  Skin: No rashes, No ecchymoses, No cyanosis.	  wounds ,skin lesions-See skin assesment flow sheet   LABS:                        14.9   9.70  )-----------( 206      ( 17 Feb 2022 07:27 )             47.2     02-17    134<L>  |  97  |  68<H>  ----------------------------<  213<H>  4.7   |  29  |  1.40<H>    Ca    7.9<L>      17 Feb 2022 07:27      CAPILLARY BLOOD GLUCOSE              Culture - Urine (collected 15 Feb 2022 16:24)  Source: Clean Catch Clean Catch (Midstream)  Final Report (16 Feb 2022 13:24):    No growth    Culture - Blood (collected 14 Feb 2022 12:17)  Source: .Blood Blood-Peripheral  Preliminary Report (15 Feb 2022 13:02):    No growth to date.    Culture - Blood (collected 12 Feb 2022 12:06)  Source: .Blood Blood-Peripheral  Gram Stain (13 Feb 2022 08:12):    Growth in aerobic bottle: Gram Positive Cocci in Clusters    Growth in anaerobic bottle: Gram Positive Cocci in Clusters  Final Report (14 Feb 2022 07:16):    Growth in aerobic and anaerobic bottles: Staphylococcus aureus    See previous culture [id number]    Culture - Blood (collected 12 Feb 2022 12:06)  Source: .Blood Blood-Peripheral  Gram Stain (13 Feb 2022 08:15):    Growth in aerobic bottle: Gram Positive Cocci in Clusters    Growth in anaerobic bottle: Gram Positive Cocci in Clusters  Final Report (14 Feb 2022 07:15):    Growth in aerobic and anaerobic bottles: Staphylococcus aureus    See previous culture [id number]    Culture - Blood (collected 11 Feb 2022 16:19)  Source: .Blood Blood-Peripheral  Gram Stain (12 Feb 2022 07:00):    Growth in aerobic bottle: Gram Positive Cocci in Clusters    Growth in anaerobic bottle: Gram Positive Cocci in Clusters  Final Report (14 Feb 2022 07:13):    Growth in aerobic and anaerobic bottles: Staphylococcus aureus    ***Blood Panel PCR results on this specimen are available    approximately 3 hours after the Gram stain result.***    Gram stain, PCR, and/or culture results may not always    correspond dueto difference in methodologies.    ************************************************************    This PCR assay was performed by multiplex PCR. This    Assay tests for 66 bacterial and resistance gene targets.    Please refer to the [facility] test directory    at [email or web address] for details.  Organism: Blood Culture PCR  Staphylococcus aureus (14 Feb 2022 07:13)  Organism: Staphylococcus aureus (14 Feb 2022 07:13)  Organism: Blood Culture PCR (14 Feb 2022 07:13)    Culture - Blood (collected 11 Feb 2022 16:19)  Source: .Blood Blood-Peripheral  Gram Stain (12 Feb 2022 06:59):    Growth in aerobic bottle: Gram Positive Cocci in Clusters    Growth in anaerobic bottle: Gram Positive Cocci in Clusters  Final Report (14 Feb 2022 07:13):    Growth in aerobic and anaerobic bottles: Staphylococcus aureus    See previous culture [id number]      RADIOLOGY & ADDITIONAL TESTS:   reviewed elctronically  ASSESSMENT/PLAN: 	    25 minutes aggregate time was spent on this visit, 50% visit time spent in care co-ordination with other attendings and counselling patient .I have discussed care plan with patient / HCP/family member ,who expressed understanding of problems treatment and their effect and side effects, alternatives in details. I have asked if they have any questions and concerns and appropriately addressed them to best of my ability. 
Patient is a 61y Male with a known history of :  Acute respiratory failure with hypoxia [J96.01]    2019 novel coronavirus disease (COVID-19) [U07.1]    Chronic systolic congestive heart failure [I50.22]    CHF with cardiomyopathy [I50.9]    DERRICK (acute kidney injury) [N17.9]    Hyponatremia [E87.1]    Prophylactic measure [Z29.9]    Bacteremia [R78.81]    Sepsis [A41.9]      HPI:  61 yr old male ,known to me from [facility]  with med hx of  CAD, dilated cardiomyopathy, s/p ICD, atrial flutter on Eliquis, substance abuse was admitted initially from 12/24 for decompensated CHF, was on Milrinone and Bumex drips, left AMA on 12/31 and returned to the ED later in the day as he had to take care of personal matter. He reported shortness of breath and right arm and foot pain on presentation. Cardiology and Heart Failure specialist were consulted, he was placed on oral Bumex., subsequently transitioned to Bumex gtt. Bumex drip stopped on 1/12. Stopped  Milrinone 1/14 transitioned to oral Torsemide , metoprolol . Counseling given re diet and medication compliance .Not candidate for advanced therapies due to smoking/cocaine use history and non-compliance. Patient reported he was recently evicted from his apartment and is homeless, social work consult placed. Seen by PT and recommend  [facility] ([facility] The patient was medically stable for discharge.  Subsequently pt developed COVID-19 infection was admitted to [facility] and discharged a few days ago, now returns to ER from rehab  because of increasing sob and fevers .Found to have positive COVID test ,seen by ID & pulm consult requested Palliative care consult requested ,to discuss advance directives and complete MOLST  (11 Feb 2022 13:41)      REVIEW OF SYSTEMS:    CONSTITUTIONAL: No fever, weight loss, or fatigue  EYES: No eye pain, visual disturbances, or discharge  ENMT:  No difficulty hearing, tinnitus, vertigo; No sinus or throat pain  NECK: No pain or stiffness  BREASTS: No pain, masses, or nipple discharge  RESPIRATORY: No cough, wheezing, chills or hemoptysis; No shortness of breath  CARDIOVASCULAR: No chest pain, palpitations, dizziness, or leg swelling  GASTROINTESTINAL: No abdominal or epigastric pain. No nausea, vomiting, or hematemesis; No diarrhea or constipation. No melena or hematochezia.  GENITOURINARY: No dysuria, frequency, hematuria, or incontinence  NEUROLOGICAL: No headaches, memory loss, loss of strength, numbness, or tremors  SKIN: No itching, burning, rashes, or lesions   LYMPH NODES: No enlarged glands  ENDOCRINE: No heat or cold intolerance; No hair loss  MUSCULOSKELETAL: No joint pain or swelling; No muscle, back, or extremity pain  PSYCHIATRIC: No depression, anxiety, mood swings, or difficulty sleeping  HEME/LYMPH: No easy bruising, or bleeding gums  ALLERGY AND IMMUNOLOGIC: No hives or eczema    MEDICATIONS  (STANDING):  aMIOdarone    Tablet 200 milliGRAM(s) Oral daily  apixaban 5 milliGRAM(s) Oral every 12 hours  aspirin enteric coated 81 milliGRAM(s) Oral daily  atorvastatin 40 milliGRAM(s) Oral at bedtime  buMETAnide 2 milliGRAM(s) Oral two times a day  ceFAZolin   IVPB 2000 milliGRAM(s) IV Intermittent every 8 hours  chlorhexidine 4% Liquid 1 Application(s) Topical <User Schedule>  gabapentin 100 milliGRAM(s) Oral three times a day  lactobacillus acidophilus 1 Tablet(s) Oral two times a day  lidocaine   4% Patch 1 Patch Transdermal daily  metoprolol succinate ER 25 milliGRAM(s) Oral daily  midodrine. 5 milliGRAM(s) Oral once  pantoprazole    Tablet 40 milliGRAM(s) Oral before breakfast  spironolactone 25 milliGRAM(s) Oral daily    MEDICATIONS  (PRN):  acetaminophen     Tablet .. 650 milliGRAM(s) Oral every 6 hours PRN Temp greater or equal to 38C (100.4F), Mild Pain (1 - 3)  ALBUTerol    90 MICROgram(s) HFA Inhaler 2 Puff(s) Inhalation every 6 hours PRN Shortness of Breath and/or Wheezing  aluminum hydroxide/magnesium hydroxide/simethicone Suspension 30 milliLiter(s) Oral every 4 hours PRN Dyspepsia  guaiFENesin Oral Liquid (Sugar-Free) 200 milliGRAM(s) Oral every 6 hours PRN Cough  melatonin 3 milliGRAM(s) Oral at bedtime PRN Insomnia  ondansetron Injectable 4 milliGRAM(s) IV Push every 8 hours PRN Nausea and/or Vomiting  simethicone 80 milliGRAM(s) Chew three times a day PRN Indigestion  sodium chloride 0.9% lock flush 10 milliLiter(s) IV Push every 1 hour PRN Pre/post blood products, medications, blood draw, and to maintain line patency      ALLERGIES: No Known Allergies      FAMILY HISTORY:  FH: lung cancer        PHYSICAL EXAMINATION:  -----------------------------  T(C): 36.4 (02-21-22 @ 04:30), Max: 37.7 (02-20-22 @ 20:33)  HR: 92 (02-21-22 @ 04:30) (92 - 98)  BP: 128/87 (02-21-22 @ 04:30) (79/54 - 128/87)  RR: 18 (02-21-22 @ 04:30) (17 - 18)  SpO2: 92% (02-21-22 @ 04:30) (92% - 96%)  Wt(kg): --    02-20 @ 07:01  -  02-21 @ 07:00  --------------------------------------------------------  IN:  Total IN: 0 mL    OUT:    Incontinent per Condom Catheter (mL): 400 mL    Voided (mL): 800 mL  Total OUT: 1200 mL    Total NET: -1200 mL      02-21 @ 07:01  -  02-21 @ 09:28  --------------------------------------------------------  IN:  Total IN: 0 mL    OUT:    Voided (mL): 500 mL  Total OUT: 500 mL    Total NET: -500 mL            VITALS  T(C): 36.4 (02-21-22 @ 04:30), Max: 37.7 (02-20-22 @ 20:33)  HR: 92 (02-21-22 @ 04:30) (92 - 98)  BP: 128/87 (02-21-22 @ 04:30) (79/54 - 128/87)  RR: 18 (02-21-22 @ 04:30) (17 - 18)  SpO2: 92% (02-21-22 @ 04:30) (92% - 96%)    Constitutional: well developed, normal appearance, well groomed, well nourished, no deformities and no acute distress.   Eyes: the conjunctiva exhibited no abnormalities and the eyelids demonstrated no xanthelasmas.   HEENT: normal oral mucosa, no oral pallor and no oral cyanosis.   Neck: normal jugular venous A waves present, normal jugular venous V waves present and no jugular venous malone A waves.   Pulmonary: no respiratory distress, normal respiratory rhythm and effort, no accessory muscle use and lungs were clear to auscultation bilaterally.   Cardiovascular: heart rate and rhythm were normal, normal S1 and S2 and no murmur, gallop, rub, heave or thrill are present.   Abdomen: soft, non-tender, no hepato-splenomegaly and no abdominal mass palpated.   Musculoskeletal: the gait could not be assessed..   Extremities: no clubbing of the fingernails, no localized cyanosis, no petechial hemorrhages and no ischemic changes.   Skin: normal skin color and pigmentation, no rash, no venous stasis, no skin lesions, no skin ulcer and no xanthoma was observed.   Psychiatric: oriented to person, place, and time, the affect was normal, the mood was normal and not feeling anxious.     LABS:   --------                             15.2   5.24  )-----------( 132      ( 21 Feb 2022 08:04 )             48.4                 RADIOLOGY:  -----------------    ECG:     ECHO:
PROGRESS NOTE  Patient is a 61y old  Male who presents with a chief complaint of SOB (25 Feb 2022 11:27)    Chart and available morning labs /imaging are reviewed electronically , urgent issues addressed . More information  is being added upon completion of rounds , when more information is collected and management discussed with consultants , medical staff and social service/case management on the floor   OVERNIGHT  No new issues reported by medical staff . All above noted Patient is resting in a bed comfortably .Confused ,poor mentation .No distress noted     HPI:  61 yr old male ,known to me from [facility]  with med hx of  CAD, dilated cardiomyopathy, s/p ICD, atrial flutter on Eliquis, substance abuse was admitted initially from 12/24 for decompensated CHF, was on Milrinone and Bumex drips, left AMA on 12/31 and returned to the ED later in the day as he had to take care of personal matter. He reported shortness of breath and right arm and foot pain on presentation. Cardiology and Heart Failure specialist were consulted, he was placed on oral Bumex., subsequently transitioned to Bumex gtt. Bumex drip stopped on 1/12. Stopped  Milrinone 1/14 transitioned to oral Torsemide , metoprolol . Counseling given re diet and medication compliance .Not candidate for advanced therapies due to smoking/cocaine use history and non-compliance. Patient reported he was recently evicted from his apartment and is homeless, social work consult placed. Seen by PT and recommend  [facility]. The patient was medically stable for discharge.  Subsequently pt developed COVID-19 infection was admitted to [facility] and discharged a few days ago, now returns to ER from rehab  because of increasing sob and fevers .Found to have positive COVID test ,seen by ID & pulm consult requested Palliative care consult requested ,to discuss advance directives and complete MOLST  (11 Feb 2022 13:41)    PAST MEDICAL & SURGICAL HISTORY:  Heart failure, systolic    CAD (coronary artery disease)    Hypertension    Nonischemic cardiomyopathy    COPD, moderate    2019 novel coronavirus disease (COVID-19)    Substance abuse    HLD (hyperlipidemia)    Cardiac LV ejection fraction 10-20%    AICD (automatic cardioverter/defibrillator) present    Cardiac LV ejection fraction 10-20%        MEDICATIONS  (STANDING):  aMIOdarone    Tablet 200 milliGRAM(s) Oral daily  apixaban 5 milliGRAM(s) Oral every 12 hours  aspirin enteric coated 81 milliGRAM(s) Oral daily  atorvastatin 40 milliGRAM(s) Oral at bedtime  buMETAnide 2 milliGRAM(s) Oral two times a day  ceFAZolin   IVPB 2000 milliGRAM(s) IV Intermittent every 8 hours  chlorhexidine 4% Liquid 1 Application(s) Topical <User Schedule>  gabapentin 100 milliGRAM(s) Oral three times a day  lactobacillus acidophilus 1 Tablet(s) Oral two times a day  lidocaine   4% Patch 1 Patch Transdermal daily  metoprolol succinate ER 25 milliGRAM(s) Oral daily  midodrine. 5 milliGRAM(s) Oral once  pantoprazole    Tablet 40 milliGRAM(s) Oral before breakfast  sacubitril 24 mG/valsartan 26 mG 1 Tablet(s) Oral two times a day  spironolactone 25 milliGRAM(s) Oral daily    MEDICATIONS  (PRN):  acetaminophen     Tablet .. 650 milliGRAM(s) Oral every 6 hours PRN Temp greater or equal to 38C (100.4F), Mild Pain (1 - 3)  ALBUTerol    90 MICROgram(s) HFA Inhaler 2 Puff(s) Inhalation every 6 hours PRN Shortness of Breath and/or Wheezing  aluminum hydroxide/magnesium hydroxide/simethicone Suspension 30 milliLiter(s) Oral every 4 hours PRN Dyspepsia  guaiFENesin Oral Liquid (Sugar-Free) 200 milliGRAM(s) Oral every 6 hours PRN Cough  melatonin 3 milliGRAM(s) Oral at bedtime PRN Insomnia  ondansetron Injectable 4 milliGRAM(s) IV Push every 8 hours PRN Nausea and/or Vomiting  simethicone 80 milliGRAM(s) Chew three times a day PRN Indigestion  sodium chloride 0.9% lock flush 10 milliLiter(s) IV Push every 1 hour PRN Pre/post blood products, medications, blood draw, and to maintain line patency      OBJECTIVE    T(C): 36.8 (02-24-22 @ 20:02), Max: 36.8 (02-24-22 @ 20:02)  HR: 96 (02-24-22 @ 20:02) (96 - 96)  BP: 95/60 (02-25-22 @ 05:44) (95/60 - 98/73)  RR: 18 (02-24-22 @ 20:02) (18 - 18)  SpO2: 96% (02-24-22 @ 20:02) (96% - 96%)  Wt(kg): --  I&O's Summary    24 Feb 2022 07:01  -  25 Feb 2022 07:00  --------------------------------------------------------  IN: 0 mL / OUT: 800 mL / NET: -800 mL          REVIEW OF SYSTEMS:  CONSTITUTIONAL: No fever, weight loss, or fatigue  EYES: No eye pain, visual disturbances, or discharge  ENMT:   No sinus or throat pain  NECK: No pain or stiffness  RESPIRATORY: No cough, wheezing, chills or hemoptysis; No shortness of breath  CARDIOVASCULAR: No chest pain, palpitations, dizziness, or leg swelling  GASTROINTESTINAL: No abdominal pain. No nausea, vomiting; No diarrhea or constipation. No melena or hematochezia.  GENITOURINARY: No dysuria, frequency, hematuria, or incontinence  NEUROLOGICAL: No headaches, memory loss, loss of strength, numbness, or tremors  SKIN: No itching, burning, rashes, or lesions   MUSCULOSKELETAL: No joint pain or swelling; No muscle, back, or extremity pain    PHYSICAL EXAM:  Appearance: NAD. VS past 24 hrs -as above   HEENT:   Moist oral mucosa. Conjunctiva clear b/l.   Neck : supple  Respiratory: Lungs CTAB.  Gastrointestinal:  Soft, nontender. No rebound. No rigidity. BS present	  Cardiovascular: RRR ,S1S2 present  Neurologic: Non-focal. Moving all extremities.  Extremities: No edema. No erythema. No calf tenderness.  Skin: No rashes, No ecchymoses, No cyanosis.	  wounds ,skin lesions-See skin assesment flow sheet   LABS:                        13.9   6.33  )-----------( 182      ( 25 Feb 2022 08:56 )             45.2     02-25    141  |  106  |  32<H>  ----------------------------<  105<H>  4.7   |  30  |  1.20    Ca    9.0      25 Feb 2022 08:56    TPro  6.0  /  Alb  2.3<L>  /  TBili  1.2  /  DBili  x   /  AST  72<H>  /  ALT  22  /  AlkPhos  552<H>  02-24    CAPILLARY BLOOD GLUCOSE              Culture - Blood (collected 23 Feb 2022 18:14)  Source: .Blood Blood-Peripheral  Preliminary Report (24 Feb 2022 19:01):    No growth to date.    Culture - Blood (collected 23 Feb 2022 18:14)  Source: .Blood Blood-Peripheral  Preliminary Report (24 Feb 2022 19:01):    No growth to date.    Culture - Blood (collected 22 Feb 2022 06:35)  Source: .Blood Blood  Preliminary Report (23 Feb 2022 07:01):    No growth to date.    Culture - Blood (collected 22 Feb 2022 06:35)  Source: .Blood Blood  Preliminary Report (23 Feb 2022 07:01):    No growth to date.    Culture - Urine (collected 15 Feb 2022 16:24)  Source: Clean Catch Clean Catch (Midstream)  Final Report (16 Feb 2022 13:24):    No growth      RADIOLOGY & ADDITIONAL TESTS:   reviewed elctronically  ASSESSMENT/PLAN: 	  Patient was seen and examined on a day of discharge . Plan of care , discharge medications and recommendations discussed with consultants and clearance for discharge obtained .Social service , case management  and medical staff are aware of plan. Family is notified. Discharge summary  is  prepared electronically-see separate document prepared by me .75minutes spent on this visit, 50% visit time spent in care co-ordination with other attendings and counselling patient  I have discussed care plan with patient and HCP,expressed understanding of problems treatment and their effect and side effects, alternatives in detail,I have asked if they have any questions and concerns and appropriately addressed them to best of my ability   
PROGRESS NOTE  Patient is a 61y old  Male who presents with a chief complaint of SOB (19 Feb 2022 13:16)    Chart and available morning labs /imaging are reviewed electronically , urgent issues addressed . More information  is being added upon completion of rounds , when more information is collected and management discussed with consultants , medical staff and social service/case management on the floor   OVERNIGHT  No new issues reported by medical staff . All above noted Patient is resting in a bed comfortably . .No distress noted   Awaiting for authorisation for rehab placement   HPI:  61 yr old male ,known to me from [facility]  with med hx of  CAD, dilated cardiomyopathy, s/p ICD, atrial flutter on Eliquis, substance abuse was admitted initially from 12/24 for decompensated CHF, was on Milrinone and Bumex drips, left AMA on 12/31 and returned to the ED later in the day as he had to take care of personal matter. He reported shortness of breath and right arm and foot pain on presentation. Cardiology and Heart Failure specialist were consulted, he was placed on oral Bumex., subsequently transitioned to Bumex gtt. Bumex drip stopped on 1/12. Stopped  Milrinone 1/14 transitioned to oral Torsemide , metoprolol . Counseling given re diet and medication compliance .Not candidate for advanced therapies due to smoking/cocaine use history and non-compliance. Patient reported he was recently evicted from his apartment and is homeless, social work consult placed. Seen by PT and recommend  [facility]. The patient was medically stable for discharge.  Subsequently pt developed COVID-19 infection was admitted to [facility] and discharged a few days ago, now returns to ER from rehab  because of increasing sob and fevers .Found to have positive COVID test ,seen by ID & pulm consult requested Palliative care consult requested ,to discuss advance directives and complete MOLST  (11 Feb 2022 13:41)    PAST MEDICAL & SURGICAL HISTORY:  Heart failure, systolic    CAD (coronary artery disease)    Hypertension    Nonischemic cardiomyopathy    COPD, moderate    2019 novel coronavirus disease (COVID-19)    Substance abuse    HLD (hyperlipidemia)    Cardiac LV ejection fraction 10-20%    AICD (automatic cardioverter/defibrillator) present    Cardiac LV ejection fraction 10-20%        MEDICATIONS  (STANDING):  aMIOdarone    Tablet 200 milliGRAM(s) Oral daily  apixaban 5 milliGRAM(s) Oral every 12 hours  aspirin enteric coated 81 milliGRAM(s) Oral daily  atorvastatin 40 milliGRAM(s) Oral at bedtime  buMETAnide 2 milliGRAM(s) Oral two times a day  ceFAZolin   IVPB 2000 milliGRAM(s) IV Intermittent every 8 hours  chlorhexidine 4% Liquid 1 Application(s) Topical <User Schedule>  gabapentin 100 milliGRAM(s) Oral three times a day  lactobacillus acidophilus 1 Tablet(s) Oral two times a day  lidocaine   4% Patch 1 Patch Transdermal daily  metoprolol succinate ER 25 milliGRAM(s) Oral daily  pantoprazole    Tablet 40 milliGRAM(s) Oral before breakfast  sacubitril 24 mG/valsartan 26 mG 1 Tablet(s) Oral two times a day  spironolactone 25 milliGRAM(s) Oral daily    MEDICATIONS  (PRN):  acetaminophen     Tablet .. 650 milliGRAM(s) Oral every 6 hours PRN Temp greater or equal to 38C (100.4F), Mild Pain (1 - 3)  ALBUTerol    90 MICROgram(s) HFA Inhaler 2 Puff(s) Inhalation every 6 hours PRN Shortness of Breath and/or Wheezing  aluminum hydroxide/magnesium hydroxide/simethicone Suspension 30 milliLiter(s) Oral every 4 hours PRN Dyspepsia  guaiFENesin Oral Liquid (Sugar-Free) 200 milliGRAM(s) Oral every 6 hours PRN Cough  melatonin 3 milliGRAM(s) Oral at bedtime PRN Insomnia  ondansetron Injectable 4 milliGRAM(s) IV Push every 8 hours PRN Nausea and/or Vomiting  simethicone 80 milliGRAM(s) Chew three times a day PRN Indigestion  sodium chloride 0.9% lock flush 10 milliLiter(s) IV Push every 1 hour PRN Pre/post blood products, medications, blood draw, and to maintain line patency      OBJECTIVE    T(C): 36.8 (02-19-22 @ 11:40), Max: 36.8 (02-19-22 @ 11:40)  HR: 80 (02-19-22 @ 11:40) (64 - 80)  BP: 93/59 (02-19-22 @ 11:40) (93/59 - 115/74)  RR: 17 (02-19-22 @ 11:40) (16 - 17)  SpO2: 91% (02-19-22 @ 11:40) (91% - 97%)  Wt(kg): --  I&O's Summary    18 Feb 2022 07:01  -  19 Feb 2022 07:00  --------------------------------------------------------  IN: 0 mL / OUT: 1900 mL / NET: -1900 mL          REVIEW OF SYSTEMS:  CONSTITUTIONAL: No fever, weight loss, or fatigue  EYES: No eye pain, visual disturbances, or discharge  ENMT:   No sinus or throat pain  NECK: No pain or stiffness  RESPIRATORY: No cough, wheezing, chills or hemoptysis; No shortness of breath  CARDIOVASCULAR: No chest pain, palpitations, dizziness, or leg swelling  GASTROINTESTINAL: No abdominal pain. No nausea, vomiting; No diarrhea or constipation. No melena or hematochezia.  GENITOURINARY: No dysuria, frequency, hematuria, or incontinence  NEUROLOGICAL: No headaches, memory loss, loss of strength, numbness, or tremors  SKIN: No itching, burning, rashes, or lesions   MUSCULOSKELETAL: No joint pain or swelling; No muscle, back, or extremity pain    PHYSICAL EXAM:  Appearance: NAD. VS past 24 hrs -as above   HEENT:   Moist oral mucosa. Conjunctiva clear b/l.   Neck : supple  Respiratory: Lungs CTAB.  Gastrointestinal:  Soft, nontender. No rebound. No rigidity. BS present	  Cardiovascular: RRR ,S1S2 present  Neurologic: Non-focal. Moving all extremities.  Extremities: No edema. No erythema. No calf tenderness.  Skin: No rashes, No ecchymoses, No cyanosis.	  wounds ,skin lesions-See skin assesment flow sheet   LABS:                        16.8   6.17  )-----------( 189      ( 19 Feb 2022 08:07 )             52.2     02-19    135  |  96  |  95<H>  ----------------------------<  115<H>  5.7<H>   |  36<H>  |  1.80<H>    Ca    7.9<L>      19 Feb 2022 09:28      CAPILLARY BLOOD GLUCOSE              Culture - Urine (collected 15 Feb 2022 16:24)  Source: Clean Catch Clean Catch (Midstream)  Final Report (16 Feb 2022 13:24):    No growth    Culture - Blood (collected 14 Feb 2022 12:17)  Source: .Blood Blood-Peripheral  Final Report (19 Feb 2022 13:00):    No Growth Final    Culture - Blood (collected 12 Feb 2022 12:06)  Source: .Blood Blood-Peripheral  Gram Stain (13 Feb 2022 08:12):    Growth in aerobic bottle: Gram Positive Cocci in Clusters    Growth in anaerobic bottle: Gram Positive Cocci in Clusters  Final Report (14 Feb 2022 07:16):    Growth in aerobic and anaerobic bottles: Staphylococcus aureus    See previous culture [id number]    Culture - Blood (collected 12 Feb 2022 12:06)  Source: .Blood Blood-Peripheral  Gram Stain (13 Feb 2022 08:15):    Growth in aerobic bottle: Gram Positive Cocci in Clusters    Growth in anaerobic bottle: Gram Positive Cocci in Clusters  Final Report (14 Feb 2022 07:15):    Growth in aerobic and anaerobic bottles: Staphylococcus aureus    See previous culture [id number]    Culture - Blood (collected 11 Feb 2022 16:19)  Source: .Blood Blood-Peripheral  Gram Stain (12 Feb 2022 07:00):    Growth in aerobic bottle: Gram Positive Cocci in Clusters    Growth in anaerobic bottle: Gram Positive Cocci in Clusters  Final Report (14 Feb 2022 07:13):    Growth in aerobic and anaerobic bottles: Staphylococcus aureus    ***Blood Panel PCR results on this specimen are available    approximately 3 hours after the Gram stain result.***    Gram stain, PCR, and/or culture results may not always    correspond dueto difference in methodologies.    ************************************************************    This PCR assay was performed by multiplex PCR. This    Assay tests for 66 bacterial and resistance gene targets.    Please refer to the [facility] test directory    at [email or web address] for details.  Organism: Blood Culture PCR  Staphylococcus aureus (14 Feb 2022 07:13)  Organism: Staphylococcus aureus (14 Feb 2022 07:13)  Organism: Blood Culture PCR (14 Feb 2022 07:13)    Culture - Blood (collected 11 Feb 2022 16:19)  Source: .Blood Blood-Peripheral  Gram Stain (12 Feb 2022 06:59):    Growth in aerobic bottle: Gram Positive Cocci in Clusters    Growth in anaerobic bottle: Gram Positive Cocci in Clusters  Final Report (14 Feb 2022 07:13):    Growth in aerobic and anaerobic bottles: Staphylococcus aureus    See previous culture [id number]      RADIOLOGY & ADDITIONAL TESTS:   reviewed elctronically  ASSESSMENT/PLAN: 	    Patient was seen and examined on a day of discharge . Plan of care , discharge medications and recommendations discussed with consultants and clearance for discharge obtained .Social service , case management  and medical staff are aware of plan. Family is notified. Discharge summary  is  prepared electronically-see separate document prepared by me .75minutes spent on this visit, 50% visit time spent in care co-ordination with other attendings and counselling patient  I have discussed care plan with patient and HCP,expressed understanding of problems treatment and their effect and side effects, alternatives in detail,I have asked if they have any questions and concerns and appropriately addressed them to best of my ability 
PROGRESS NOTE  Patient is a 61y old  Male who presents with a chief complaint of SOB (13 Feb 2022 14:44)  Chart and available morning labs /imaging are reviewed electronically , urgent issues addressed . More information  is being added upon completion of rounds , when more information is collected and management discussed with consultants , medical staff and social service/case management on the floor   OVERNIGHT  No new issues reported by medical staff . All above noted Patient is resting in a bed comfortably  .No distress noted   HPI:  61 yr old male ,known to me from [facility]  with med hx of  CAD, dilated cardiomyopathy, s/p ICD, atrial flutter on Eliquis, substance abuse was admitted initially from 12/24 for decompensated CHF, was on Milrinone and Bumex drips, left AMA on 12/31 and returned to the ED later in the day as he had to take care of personal matter. He reported shortness of breath and right arm and foot pain on presentation. Cardiology and Heart Failure specialist were consulted, he was placed on oral Bumex., subsequently transitioned to Bumex gtt. Bumex drip stopped on 1/12. Stopped  Milrinone 1/14 transitioned to oral Torsemide , metoprolol . Counseling given re diet and medication compliance .Not candidate for advanced therapies due to smoking/cocaine use history and non-compliance. Patient reported he was recently evicted from his apartment and is homeless, social work consult placed. Seen by PT and recommend  CHAPINCITO. The patient was medically stable for discharge.  Subsequently pt developed COVID-19 infection was admitted to [facility] and discharged a few days ago, now returns to ER from rehab  because of increasing sob and fevers .Found to have positive COVID test ,seen by ID & pulm consult requested Palliative care consult requested ,to discuss advance directives and complete MOLST  (11 Feb 2022 13:41)    PAST MEDICAL & SURGICAL HISTORY:  Heart failure, systolic    CAD (coronary artery disease)    Hypertension    Nonischemic cardiomyopathy    COPD, moderate    2019 novel coronavirus disease (COVID-19)    Substance abuse    HLD (hyperlipidemia)    Cardiac LV ejection fraction 10-20%    AICD (automatic cardioverter/defibrillator) present    Cardiac LV ejection fraction 10-20%        MEDICATIONS  (STANDING):  aMIOdarone    Tablet 200 milliGRAM(s) Oral daily  apixaban 5 milliGRAM(s) Oral every 12 hours  aspirin enteric coated 81 milliGRAM(s) Oral daily  atorvastatin 40 milliGRAM(s) Oral at bedtime  buDESOnide    Inhalation Suspension 0.5 milliGRAM(s) Inhalation two times a day  buMETAnide Injectable 2 milliGRAM(s) IV Push every 12 hours  ceFAZolin   IVPB 2000 milliGRAM(s) IV Intermittent every 8 hours  dexAMETHasone     Tablet 6 milliGRAM(s) Oral daily  gabapentin 100 milliGRAM(s) Oral three times a day  lactobacillus acidophilus 1 Tablet(s) Oral two times a day  metoprolol succinate ER 25 milliGRAM(s) Oral daily  pantoprazole    Tablet 40 milliGRAM(s) Oral before breakfast    MEDICATIONS  (PRN):  acetaminophen     Tablet .. 650 milliGRAM(s) Oral every 6 hours PRN Temp greater or equal to 38C (100.4F), Mild Pain (1 - 3)  aluminum hydroxide/magnesium hydroxide/simethicone Suspension 30 milliLiter(s) Oral every 4 hours PRN Dyspepsia  melatonin 3 milliGRAM(s) Oral at bedtime PRN Insomnia  ondansetron Injectable 4 milliGRAM(s) IV Push every 8 hours PRN Nausea and/or Vomiting  simethicone 80 milliGRAM(s) Chew three times a day PRN Indigestion      OBJECTIVE    T(C): 36.6 (02-13-22 @ 11:05), Max: 36.7 (02-12-22 @ 20:16)  HR: 71 (02-13-22 @ 11:05) (68 - 78)  BP: 125/88 (02-13-22 @ 11:05) (120/90 - 125/88)  RR: 21 (02-13-22 @ 11:05) (17 - 21)  SpO2: 100% (02-13-22 @ 11:05) (95% - 100%)  Wt(kg): --  I&O's Summary    12 Feb 2022 07:01  -  13 Feb 2022 07:00  --------------------------------------------------------  IN: 50 mL / OUT: 1200 mL / NET: -1150 mL    13 Feb 2022 07:01  -  13 Feb 2022 15:32  --------------------------------------------------------  IN: 360 mL / OUT: 100 mL / NET: 260 mL          REVIEW OF SYSTEMS:  CONSTITUTIONAL: No fever, weight loss, or fatigue  A 10-point review of systems was obtained.   Review of systems otherwise unchanged compared to prior visit except as previously noted   PHYSICAL EXAM:  Appearance: NAD. VS past 24 hrs -as above   HEENT: NC atraumatic  Neck: supple  Respiratory: no accessory muscle use, breathing comfortably  Cardiovascular: distant  Gastrointestinal: normal appearing, nondistended  Extremities: no clubbing, no cyanosis,                         12.7   11.78 )-----------( 175      ( 13 Feb 2022 08:21 )             39.4     02-13    134<L>  |  97  |  65<H>  ----------------------------<  162<H>  4.5   |  30  |  1.50<H>    Ca    8.7      13 Feb 2022 08:21  Phos  4.1     02-12  Mg     2.6     02-12    TPro  5.6<L>  /  Alb  2.3<L>  /  TBili  2.9<H>  /  DBili  x   /  AST  34  /  ALT  42  /  AlkPhos  293<H>  02-12    CAPILLARY BLOOD GLUCOSE        PT/INR - ( 12 Feb 2022 05:48 )   PT: 30.0 sec;   INR: 2.69 ratio               Culture - Blood (collected 12 Feb 2022 12:06)  Source: .Blood Blood-Peripheral  Gram Stain (13 Feb 2022 08:12):    Growth in aerobic bottle: Gram Positive Cocci in Clusters    Growth in anaerobic bottle: Gram Positive Cocci in Clusters  Preliminary Report (13 Feb 2022 08:12):    Growth in aerobic bottle: Gram Positive Cocci in Clusters    Growth in anaerobic bottle: Gram Positive Cocci in Clusters    Culture - Blood (collected 12 Feb 2022 12:06)  Source: .Blood Blood-Peripheral  Gram Stain (13 Feb 2022 08:15):    Growth in aerobic bottle: Gram Positive Cocci in Clusters    Growth in anaerobic bottle: Gram Positive Cocci in Clusters  Preliminary Report (13 Feb 2022 08:15):    Growth in aerobic bottle: Gram Positive Cocci in Clusters    Growth in anaerobic bottle: Gram Positive Cocci in Clusters    Culture - Blood (collected 11 Feb 2022 16:19)  Source: .Blood Blood-Peripheral  Gram Stain (12 Feb 2022 07:00):    Growth in aerobic bottle: Gram Positive Cocci in Clusters    Growth in anaerobic bottle: Gram Positive Cocci in Clusters  Preliminary Report (13 Feb 2022 10:57):    Growth in aerobic and anaerobic bottles: Staphylococcus aureus    ***Blood Panel PCR results on this specimen are available    approximately 3 hours after the Gram stain result.***    Gram stain, PCR, and/or culture results may not always    correspond dueto difference in methodologies.    ************************************************************    This PCR assay was performed by multiplex PCR. This    Assay tests for 66 bacterial and resistance gene targets.    Please refer to the [facility] test directory    at [email or web address] for details.  Organism: Blood Culture PCR (12 Feb 2022 05:44)  Organism: Blood Culture PCR (12 Feb 2022 05:44)    Culture - Blood (collected 11 Feb 2022 16:19)  Source: .Blood Blood-Peripheral  Gram Stain (12 Feb 2022 06:59):    Growth in aerobic bottle: Gram Positive Cocci in Clusters    Growth in anaerobic bottle: Gram Positive Cocci in Clusters  Preliminary Report (13 Feb 2022 10:29):    Growth in aerobic and anaerobic bottles: Staphylococcus aureus    See previous culture [id number]      RADIOLOGY & ADDITIONAL TESTS:   reviewed elctronically  ASSESSMENT/PLAN: 	    25 minutes aggregate time was spent on this visit, 50% visit time spent in care co-ordination with other attendings and counselling patient .I have discussed care plan with patient / HCP/family member ,who expressed understanding of problems treatment and their effect and side effects, alternatives in details. I have asked if they have any questions and concerns and appropriately addressed them to best of my ability. 
PROGRESS NOTE  Patient is a 61y old  Male who presents with a chief complaint of SOB (24 Feb 2022 11:34)  Chart and available morning labs /imaging are reviewed electronically , urgent issues addressed . More information  is being added upon completion of rounds , when more information is collected and management discussed with consultants , medical staff and social service/case management on the floor     OVERNIGHT      HPI:  61 yr old male ,known to me from [facility]  with med hx of  CAD, dilated cardiomyopathy, s/p ICD, atrial flutter on Eliquis, substance abuse was admitted initially from 12/24 for decompensated CHF, was on Milrinone and Bumex drips, left AMA on 12/31 and returned to the ED later in the day as he had to take care of personal matter. He reported shortness of breath and right arm and foot pain on presentation. Cardiology and Heart Failure specialist were consulted, he was placed on oral Bumex., subsequently transitioned to Bumex gtt. Bumex drip stopped on 1/12. Stopped  Milrinone 1/14 transitioned to oral Torsemide , metoprolol . Counseling given re diet and medication compliance .Not candidate for advanced therapies due to smoking/cocaine use history and non-compliance. Patient reported he was recently evicted from his apartment and is homeless, social work consult placed. Seen by PT and recommend  CHAPINCITO. The patient was medically stable for discharge.  Subsequently pt developed COVID-19 infection was admitted to [facility] and discharged a few days ago, now returns to ER from rehab  because of increasing sob and fevers .Found to have positive COVID test ,seen by ID & pulm consult requested Palliative care consult requested ,to discuss advance directives and complete MOLST  (11 Feb 2022 13:41)    PAST MEDICAL & SURGICAL HISTORY:  Heart failure, systolic    CAD (coronary artery disease)    Hypertension    Nonischemic cardiomyopathy    COPD, moderate    2019 novel coronavirus disease (COVID-19)    Substance abuse    HLD (hyperlipidemia)    Cardiac LV ejection fraction 10-20%    AICD (automatic cardioverter/defibrillator) present    Cardiac LV ejection fraction 10-20%        MEDICATIONS  (STANDING):  aMIOdarone    Tablet 200 milliGRAM(s) Oral daily  apixaban 5 milliGRAM(s) Oral every 12 hours  aspirin enteric coated 81 milliGRAM(s) Oral daily  atorvastatin 40 milliGRAM(s) Oral at bedtime  buMETAnide 2 milliGRAM(s) Oral two times a day  ceFAZolin   IVPB 2000 milliGRAM(s) IV Intermittent every 8 hours  chlorhexidine 4% Liquid 1 Application(s) Topical <User Schedule>  gabapentin 100 milliGRAM(s) Oral three times a day  lactobacillus acidophilus 1 Tablet(s) Oral two times a day  lidocaine   4% Patch 1 Patch Transdermal daily  metoprolol succinate ER 25 milliGRAM(s) Oral daily  midodrine. 5 milliGRAM(s) Oral once  pantoprazole    Tablet 40 milliGRAM(s) Oral before breakfast  sacubitril 24 mG/valsartan 26 mG 1 Tablet(s) Oral two times a day  sodium chloride 0.45%. 1000 milliLiter(s) (50 mL/Hr) IV Continuous <Continuous>  spironolactone 25 milliGRAM(s) Oral daily    MEDICATIONS  (PRN):  acetaminophen     Tablet .. 650 milliGRAM(s) Oral every 6 hours PRN Temp greater or equal to 38C (100.4F), Mild Pain (1 - 3)  ALBUTerol    90 MICROgram(s) HFA Inhaler 2 Puff(s) Inhalation every 6 hours PRN Shortness of Breath and/or Wheezing  aluminum hydroxide/magnesium hydroxide/simethicone Suspension 30 milliLiter(s) Oral every 4 hours PRN Dyspepsia  guaiFENesin Oral Liquid (Sugar-Free) 200 milliGRAM(s) Oral every 6 hours PRN Cough  melatonin 3 milliGRAM(s) Oral at bedtime PRN Insomnia  ondansetron Injectable 4 milliGRAM(s) IV Push every 8 hours PRN Nausea and/or Vomiting  simethicone 80 milliGRAM(s) Chew three times a day PRN Indigestion  sodium chloride 0.9% lock flush 10 milliLiter(s) IV Push every 1 hour PRN Pre/post blood products, medications, blood draw, and to maintain line patency      OBJECTIVE    T(C): 36.4 (02-24-22 @ 12:55), Max: 37.4 (02-23-22 @ 19:56)  HR: 95 (02-24-22 @ 12:55) (95 - 103)  BP: 109/75 (02-24-22 @ 12:55) (104/72 - 118/71)  RR: 17 (02-24-22 @ 12:55) (17 - 17)  SpO2: 92% (02-24-22 @ 12:55) (90% - 94%)  Wt(kg): --  I&O's Summary    23 Feb 2022 07:01  -  24 Feb 2022 07:00  --------------------------------------------------------  IN: 150 mL / OUT: 3300 mL / NET: -3150 mL    24 Feb 2022 07:01  -  24 Feb 2022 18:33  --------------------------------------------------------  IN: 0 mL / OUT: 400 mL / NET: -400 mL          REVIEW OF SYSTEMS:  CONSTITUTIONAL: No fever, weight loss, or fatigue  EYES: No eye pain, visual disturbances, or discharge  ENMT:   No sinus or throat pain  NECK: No pain or stiffness  RESPIRATORY: No cough, wheezing, chills or hemoptysis; No shortness of breath  CARDIOVASCULAR: No chest pain, palpitations, dizziness, or leg swelling  GASTROINTESTINAL: No abdominal pain. No nausea, vomiting; No diarrhea or constipation. No melena or hematochezia.  GENITOURINARY: No dysuria, frequency, hematuria, or incontinence  NEUROLOGICAL: No headaches, memory loss, loss of strength, numbness, or tremors  SKIN: No itching, burning, rashes, or lesions   MUSCULOSKELETAL: No joint pain or swelling; No muscle, back, or extremity pain    PHYSICAL EXAM:  Appearance: NAD. VS past 24 hrs -as above   HEENT:   Moist oral mucosa. Conjunctiva clear b/l.   Neck : supple  Respiratory: Lungs CTAB.  Gastrointestinal:  Soft, nontender. No rebound. No rigidity. BS present	  Cardiovascular: RRR ,S1S2 present  Neurologic: Non-focal. Moving all extremities.  Extremities: No edema. No erythema. No calf tenderness.  Skin: No rashes, No ecchymoses, No cyanosis.	  wounds ,skin lesions-See skin assesment flow sheet   LABS:                        13.0   6.97  )-----------( 174      ( 24 Feb 2022 07:52 )             42.0     02-24    139  |  105  |  39<H>  ----------------------------<  132<H>  4.4   |  29  |  1.20    Ca    8.8      24 Feb 2022 07:52    TPro  6.0  /  Alb  2.3<L>  /  TBili  1.2  /  DBili  x   /  AST  72<H>  /  ALT  22  /  AlkPhos  552<H>  02-24    CAPILLARY BLOOD GLUCOSE              Culture - Blood (collected 22 Feb 2022 06:35)  Source: .Blood Blood  Preliminary Report (23 Feb 2022 07:01):    No growth to date.    Culture - Blood (collected 22 Feb 2022 06:35)  Source: .Blood Blood  Preliminary Report (23 Feb 2022 07:01):    No growth to date.    Culture - Urine (collected 15 Feb 2022 16:24)  Source: Clean Catch Clean Catch (Midstream)  Final Report (16 Feb 2022 13:24):    No growth      RADIOLOGY & ADDITIONAL TESTS: < from: CT Neck Soft Tissue w/ IV Cont (02.24.22 @ 12:17) >  ACC: [id number] EXAM:  CT NECK SOFT TISSUE IC                          PROCEDURE DATE:  02/24/2022          INTERPRETATION:  CLINICAL INFORMATION: Concerns for soft tissue infection   of the neck. Bacteremia.    TECHNIQUE: Contrast-enhanced CT of [facility] was performed. Helically   acquired images from the skull base to the aortic arch following the   administration of intravenous contrast were reformatted in coronal and   sagittal planes and viewed at bone and soft tissue windows.    90 cc of Omnipaque-350 were administered intravenously without   complication and 10 cc were discarded.    COMPARISON: No similar prior studies available for comparison.    FINDINGS:    Multiple carious maxillary and mandibular teeth with periapical   lucencies.In particular, there is dehiscence of the buccal cortex of the   right mandible at the level of carious right mandibular canine and first   premolar.    No masslike or nodular enhancement along the aerodigestive tract.    No enlarged cervical lymph nodes.    Bilateral parotid, submandibular, and thyroid glands are within normal   limits.    The vascular structures demonstrate normal enhancement.    There is mild mucosal thickening of the right maxillary sinus. Near   complete opacification of theright sphenoid sinus.    The lung apices demonstrate centrilobular emphysema.    There is a cardiac device in the left chest wall.      IMPRESSION:  Multiple carious maxillary and mandibular teeth with periapical   lucencies. In particular, there is dehiscence of the buccal cortex of the   right mandible at the level of carious right mandibular canine and first   premolar. No rim-enhancing fluid collection.    < end of copied text >     reviewed elctronically  ASSESSMENT/PLAN: 	    25 minutes aggregate time was spent on this visit, 50% visit time spent in care co-ordination with other attendings and counselling patient .I have discussed care plan with patient / HCP/family member ,who expressed understanding of problems treatment and their effect and side effects, alternatives in details. I have asked if they have any questions and concerns and appropriately addressed them to best of my ability.

## 2022-02-25 NOTE — PROGRESS NOTE ADULT - PROBLEM SELECTOR PROBLEM 3
Bacteremia
2019 novel coronavirus disease (COVID-19)
Bacteremia
2019 novel coronavirus disease (COVID-19)
2019 novel coronavirus disease (COVID-19)
Bacteremia
2019 novel coronavirus disease (COVID-19)

## 2022-02-25 NOTE — PROGRESS NOTE ADULT - PROBLEM SELECTOR PLAN 9
serial bmp ,ins/outs ,nephrology consult f/up

## 2022-03-14 NOTE — CONSULT NOTE ADULT - TIME BILLING
Called to ck the status of [id number] $& [id number] as of today sill pending per Michelle. .Ref# [id number].
Acute on chronic HFrEF, Hyponatremia, pAflutter, DERRICK on CKD
- Review of notes/records, telemetry, vital signs and daily labs.   - General and cardiovascular physical examination.  - Generation of cardiovascular treatment plan.  - Coordination of care with primary team.

## 2022-11-10 NOTE — PROGRESS NOTE ADULT - PROBLEM SELECTOR PLAN 1
Dilaudid--last seen 10/6/22 (related), toxisure and contract done on 1/4/22   ACC/AHA stage C, NICMP (LVEF <20%, LVIDd 6.96cm)  Clinically hypervolemic, cool extremities  Inotropes: Milrinone 0.125mcg/kg/min  Diuretics: Bumex gtt @ 1mg/hr.   Please check BMP q12 hrs while on gtt. Maintain K+ >4.0 and Mg >2.0.  Monitor markers of perfusion daily including lactate, LFTs  Device: s/p ICD  Will reintroduce GDMT as tolerated after diuresis.  Not candidate for advanced therapies due to smoking/cocaine use history and non-compliance.

## 2022-12-23 PROBLEM — J44.9 CHRONIC OBSTRUCTIVE PULMONARY DISEASE, UNSPECIFIED: Chronic | Status: ACTIVE | Noted: 2022-01-01

## 2022-12-23 PROBLEM — R09.89 OTHER SPECIFIED SYMPTOMS AND SIGNS INVOLVING THE CIRCULATORY AND RESPIRATORY SYSTEMS: Chronic | Status: ACTIVE | Noted: 2022-01-01

## 2022-12-23 PROBLEM — F19.10 OTHER PSYCHOACTIVE SUBSTANCE ABUSE, UNCOMPLICATED: Chronic | Status: ACTIVE | Noted: 2022-01-01

## 2022-12-23 PROBLEM — E78.5 HYPERLIPIDEMIA, UNSPECIFIED: Chronic | Status: ACTIVE | Noted: 2022-01-01

## 2022-12-23 PROBLEM — U07.1 COVID-19: Chronic | Status: ACTIVE | Noted: 2022-01-01

## 2022-12-23 NOTE — ED PROVIDER NOTE - NSICDXPASTSURGICALHX_GEN_ALL_CORE_FT
PAST SURGICAL HISTORY:  AICD (automatic cardioverter/defibrillator) present     Cardiac LV ejection fraction 10-20%

## 2022-12-23 NOTE — CONSULT NOTE ADULT - ASSESSMENT
Full note to follow pending evaluation    Covering Dr. Phan  Infectious Diseases will continue to follow. Please call with any questions.   Shila Frazier M.D.  [facility] Division of Infectious Diseases [phone]   Pt is a 62M CAD, Dilated cardiomyopathy, S/p AICD, Afib/flutter, h/o substance abuse, CKD baseline creat approx 1.4 last admitted to [facility] with MSSA bacteremia, and CHF.    Sent from [facility] with acute hypoxic respiratory failure. Was initially on BiPAP then became hypoxic.  Anesthesia intubated patient.  On post intubation Xray pneumothorax evident.  Patient with PEA arrest.  Chest tube placed by ED physician.  ROSC obtained.       S/p PEA arrest  AHRF requiring intubation  ?Aspiration PNA   Loculated pleural effusions  - imaging reviewed as above  - CT chest if possible  - f/u pending cx  - c/w zosyn  - trend temps/WBC  - maintain aspiration precautions  - ICU care    GOC per primary team    Covering Dr. Phan  Infectious Diseases will continue to follow. Please call with any questions.   Shila Frazier M.D.  [facility] Division of Infectious Diseases [phone]

## 2022-12-23 NOTE — ED ADULT NURSE NOTE - NSFALLRSKASSISTTYPE_ED_ALL_ED
Bedside and Verbal shift change report given to [address] [place] [address] Road (oncoming nurse) by Desiree Klein RN (offgoing nurse). Report included the following information SBAR, Kardex, ED Summary, MAR, Recent Results and Med Rec Status. Standing/Walking/Toileting

## 2022-12-23 NOTE — CONSULT NOTE ADULT - SUBJECTIVE AND OBJECTIVE BOX
[facility], Division of Infectious Diseases  MARINA Mccray S. Shah, Y. Patel, [facility]  [phone]    DOROTHY VOSS  62y, Male  [id number]    HPI--  HPI:  62M CAD, Dilated cardiomyopathy, S/p AICD, Afib/flutter, h/o substance abuse, CKD baseline creat approx 1.4 last admitted to [facility] with MSSA bacteremia, and CHF.  Sent from [facility] with acute hypoxic respiratory failure.  Was initially on BiPAP then became hypoxic.  Anesthesia intubated patient.  On post intubation Xray pneumothorax evident.  Patient with PEA arrest.  Chest tube placed by ED physician.  ROSC obtained.     Patient unable to give further history.     (23 Dec 2022 13:02)    Pt seen and examined in the SPCU      Active Medications--  albuterol/ipratropium (CFC free) Inhaler. 1 Puff(s) Inhalation every 6 hours  albuterol/ipratropium for Nebulization. 3 milliLiter(s) Nebulizer once  aspirin  chewable 81 milliGRAM(s) Oral daily  chlorhexidine 0.12% Liquid 15 milliLiter(s) Oral Mucosa every 12 hours  dextrose 5%. 1000 milliLiter(s) IV Continuous <Continuous>  dextrose 5%. 1000 milliLiter(s) IV Continuous <Continuous>  dextrose 50% Injectable 25 Gram(s) IV Push once  dextrose 50% Injectable 12.5 Gram(s) IV Push once  dextrose 50% Injectable 25 Gram(s) IV Push once  dextrose Oral Gel 15 Gram(s) Oral once PRN  fentaNYL    Injectable 100 MICROGram(s) IV Push every 4 hours PRN  glucagon  Injectable 1 milliGRAM(s) IntraMuscular once  insulin lispro (ADMELOG) corrective regimen sliding scale   SubCutaneous every 6 hours  methylPREDNISolone sodium succinate Injectable 40 milliGRAM(s) IV Push every 8 hours  pantoprazole  Injectable 40 milliGRAM(s) IV Push daily  piperacillin/tazobactam IVPB.. 3.375 Gram(s) IV Intermittent every 8 hours    Antimicrobials:   piperacillin/tazobactam IVPB.. 3.375 Gram(s) IV Intermittent every 8 hours    Immunologic:     ROS:  unable to obtain    Allergies: No Known Allergies    PMH -- Heart failure, systolic    CAD (coronary artery disease)    Hypertension    Nonischemic cardiomyopathy    COPD, moderate    2019 novel coronavirus disease (COVID-19)    Substance abuse    HLD (hyperlipidemia)    Cardiac LV ejection fraction 10-20%      PSH -- No significant past surgical history    AICD (automatic cardioverter/defibrillator) present    Cardiac LV ejection fraction 10-20%      FH -- No pertinent family history in first degree relatives    FH: lung cancer      Social History --  EtOH: denies   Tobacco: denies   Drug Use: denies     Travel/Environmental/Occupational History:    Physical Exam--  Vital Signs Last 24 Hrs  T(F): 98.4 (23 Dec 2022 14:38), Max: 100 (23 Dec 2022 10:02)  HR: 95 (23 Dec 2022 14:38) (95 - 110)  BP: 82/64 (23 Dec 2022 14:38) (82/64 - 146/102)  RR: 22 (23 Dec 2022 14:38) (22 - 445)  SpO2: 100% (23 Dec 2022 14:38) (87% - 100%)    General: elderly M, intubated  HEENT: NC/AT, ETT in place  Lungs: MV breath sounds  Chest: L CT in place  Heart: RRR  Abdomen: Soft.   Extremities: No cyanosis or clubbing. No edema.   Skin: Warm. Dry.   Laboratory & Imaging Data:  CBC:                       16.6   9.66  )-----------( 146      ( 23 Dec 2022 10:16 )             54.9     CMP:     142  |  100  |  51<H>  ----------------------------<  168<H>  4.7   |  22  |  1.70<H>    Ca    9.7      23 Dec 2022 10:16    TPro  9.2<H>  /  Alb  4.7  /  TBili  1.8<H>  /  DBili  x   /  AST  231<H>  /  ALT  122<H>  /  AlkPhos  434<H>      LIVER FUNCTIONS - ( 23 Dec 2022 10:16 )  Alb: 4.7 g/dL / Pro: 9.2 g/dL / ALK PHOS: 434 U/L / ALT: 122 U/L DA / AST: 231 U/L / GGT: x           Urinalysis Basic - ( 23 Dec 2022 11:14 )    Color: Yellow / Appearance: Turbid / S.010 / pH: x  Gluc: x / Ketone: Negative  / Bili: Negative / Urobili: Negative mg/dL   Blood: x / Protein: 100 mg/dL / Nitrite: Negative   Leuk Esterase: Moderate / RBC: 6-10 /HPF / WBC 26-50   Sq Epi: x / Non Sq Epi: Occasional / Bacteria: Occasional        Microbiology: reviewed        Radiology: reviewed    < from: Xray Chest 1 View- PORTABLE-Urgent (Xray Chest 1 View- PORTABLE-Urgent .) (22 @ 12:51) >    ACC: [id number] EXAM:  XR CHEST PORTABLE URGENT 1V                          PROCEDURE DATE:  2022          INTERPRETATION:  INDICATION: Assess chest tube placement    COMPARISON: Earlier 22 exam done 11:59AM    Technique: AP radiograph ofthe chest    FINDINGS:  Left defibrillator again noted.  Left chest tube with tip overlying left subapical region medially.  Enteric tube courses below diaphragm -tip is off the film.  ET tube tip is above the jennie  Heart/Vascular: Stable cardiomegaly.  Pulmonary: No appreciable left-sided pneumothorax. Persistent partly   loculated right pleural effusion. Subcutaneous emphysema in left chest.   Diaphragmatic subcutaneous emphysema outlines the lower heart border.  Bones: No acute bony finding.    Impression:  Left chest tube in place - no appreciable left-sided pneumothorax.        --- End of Report ---            ARI WONG MD; Attending Radiologist  This document has been electronically signed. Dec 23 2022 12:58PM    < end of copied text >

## 2022-12-23 NOTE — ED ADULT NURSE NOTE - OBJECTIVE STATEMENT
Pt BIBA from [facility] for respiratory distress  - Pt presented restless - unable to verbally communicated  - slurring speech and inappropriate sounds . (+) labored breathing and tachypneic - Pt placed on Bipap immediately upon arrival. Pt unable to given history Temp taken rectally - 100 F

## 2022-12-23 NOTE — ED PROVIDER NOTE - NSICDXPASTMEDICALHX_GEN_ALL_CORE_FT
PAST MEDICAL HISTORY:  2019 novel coronavirus disease (COVID-19)     CAD (coronary artery disease)     Cardiac LV ejection fraction 10-20%     COPD, moderate     Heart failure, systolic     HLD (hyperlipidemia)     Hypertension     Nonischemic cardiomyopathy     Substance abuse

## 2022-12-23 NOTE — ED PROVIDER NOTE - CRITICAL CARE ATTENDING CONTRIBUTION TO CARE
Patient is a 62-year-old gentleman who presents to the emergency room in significant respiratory distress.  Past medical history of COVID-19, CAD, dilated cardiomyopathy with an EF of 10 to 20% status post AICD, history of COPD, history of hyperlipidemia, hypertension, prior history of substance abuse.  At this time patient is unable to provide history.  Per EMS they received a call from [facility] stating that patient was in respiratory distress on their arrival patient was found to be hypoxic and altered.  During patient was transferred to the emergency room for further work-up.  On arrival patient was noted to have increased work of breathing and hypoxia.  Respiratory at bedside.  BiPAP applied with improvement of saturation and work of breathing.  Patient does remain lethargic but is maintaining an airway at this time.  Screening septic work-up was ordered we will check procalcitonin BNP will obtain ABG check UA urine culture and will image chest.  We will also obtain EKG.  At this time patient is not febrile and there is no active source of overt infection so he could possibly be suffering from volume overload.  Will avoid aggressive hydration at this time continue BiPAP.  We will diurese and provide nebs.  There is no active source of infection and antibiotics held at this time.  Prior to chest x-ray being shot and additional.  Critical patient came into the department this patient subsequently went into cardiac arrest.  While I was in the room attending to this patient received notification from the nursing staff that patient began to develop worsening respiratory status with increased lethargy and appeared to possibly need intubation.  So was unable to leave the current cardiac arrest anesthesiology and the hospitalist were called to the patient's bedside along with the eICU.  Ros with the other patient the decision was made by the use staff to intubate the patient.  Patient was intubated prior to my arrival into the room.  I arrived again shortly after the intubation.  Patient initially seems stable x-ray was being shot for postintubation almost immediately after this patient went into cardiac arrest.  X-ray was reviewed patient was noted to have a significant tension pneumo.  Needle decompression was performed.  ACLS was initiated and emergent chest tube was placed on patient's left lateral chest wall with ROSC.  Antibiotics are now initiated as there is a high likelihood that patient may have developed an aspiration during this event especially prior to the intubation.  Hospitalist remained at bedside patient accepted to the ICU.  Repeat chest imaging ordered chest tube in place with significant improvement in pneumothorax.  Cardiology pulmonary and palliative care consults were placed.  Patient will likely require CT imaging of his head chest abdomen and pelvis but as he is too unstable at this time in the CAT scan machine is not actively working at [place] the decision was made to admit the patient to the ICU and to obtain CT imaging later when patient is more stable or the CAT scan machine begins to function again at [place].  As patient was initially awake on arrival to the emergency room there is low suspicion for intracranial injury is likely more primarily of pulmonary process.

## 2022-12-23 NOTE — CONSULT NOTE ADULT - ASSESSMENT
62M CAD, Dilated cardiomyopathy, S/p AICD, Afib/flutter, h/o substance abuse,  presented with acute hypoxic and hypercarbic respiratory failure associated with pneumothorax.      ptx  resp failure  arnoldo hx  CM  AICD  AF    s/p ptx  s/p intubation  lung protective vent support  spoke with friend -  Meal - no HCP known - no family as per friend    oral and skin care  I and O  monitor VS and HD  CT in place -   emp ABX  COPD management  SW eval and follow up  assist with needs  monitor for needs - pain - sx -   SPCU care

## 2022-12-23 NOTE — CONSULT NOTE ADULT - SUBJECTIVE AND OBJECTIVE BOX
History of Present Illness:    Past Medical/Surgical History:    Medications:    Family History: Non-contributory family history of premature cardiovascular atherosclerotic disease    Social History: No tobacco, alcohol or drug use    Review of Systems:  General: No fevers, chills, weight gain  Skin: No rashes, color changes  Cardiovascular: No chest pain, orthopnea  Respiratory: No shortness of breath, cough  Gastrointestinal: No nausea, abdominal pain  Genitourinary: No incontinence, pain with urination  Musculoskeletal: No pain, swelling, decreased range of motion  Neurological: No headache, weakness  Psychiatric: No depression, anxiety  Endocrine: No weight gain, increased thirst  All other systems are comprehensively negative.    Physical Exam:  Vitals:        Vital Signs Last 24 Hrs  T(C): 37.8 (23 Dec 2022 10:02), Max: 37.8 (23 Dec 2022 10:02)  T(F): 100 (23 Dec 2022 10:02), Max: 100 (23 Dec 2022 10:02)  HR: 99 (23 Dec 2022 11:44) (99 - 110)  BP: 140/96 (23 Dec 2022 10:45) (131/79 - 146/102)  BP(mean): --  RR: 40 (23 Dec 2022 10:45) (40 - 445)  SpO2: 88% (23 Dec 2022 11:44) (87% - 98%)    Parameters below as of 23 Dec 2022 10:45  Patient On (Oxygen Delivery Method): BiPAP/CPAP    O2 Concentration (%): 100  General: NAD  HEENT: MMM  Neck: No JVD, no carotid bruit  Lungs: CTAB  CV: RRR, nl S1/S2, no M/R/G  Abdomen: S/NT/ND, +BS  Extremities: No LE edema, no cyanosis  Neuro: AAOx3, non-focal  Skin: No rash    Labs:                        16.6   9.66  )-----------( 146      ( 23 Dec 2022 10:16 )             54.9     12-23    142  |  100  |  51<H>  ----------------------------<  168<H>  4.7   |  22  |  1.70<H>    Ca    9.7      23 Dec 2022 10:16    TPro  9.2<H>  /  Alb  4.7  /  TBili  1.8<H>  /  DBili  x   /  AST  231<H>  /  ALT  122<H>  /  AlkPhos  434<H>  12-23        PT/INR - ( 23 Dec 2022 10:16 )   PT: 16.9 sec;   INR: 1.46 ratio         PTT - ( 23 Dec 2022 10:16 )  PTT:34.9 sec    ECG/Telemetry: Sinus tachycardia, LBBB     History of Present Illness: The patient is a 62 year old male with a history of CAD, chronic systolic heart failure s/p ICD, atrial flutter s/p DCCV, substance abuse, CKD who is admitted with respiratory failure. The patient was actively coding on my arrival and unable to provide any history. As per notes, he was found to be in respiratory distress at NH. He was emergently intubated. He then went into PEA arrest. He was found to have a pneumothorax and chest tube placed during ACLS. The patient received multiple rounds of CPR and epi. After chest tube placement, there was ROSC (after about 24 min).    Past Medical/Surgical History:  CAD, chronic systolic heart failure s/p ICD, atrial flutter s/p DCCV, substance abuse, CKD    Medications:  Home Medications:  acetaminophen 325 mg oral tablet: 2 tab(s) orally every 6 hours, As needed, Temp greater or equal to 38C (100.4F), Mild Pain (1 - 3) (23 Dec 2022 10:07)  apixaban 5 mg oral tablet: 1 tab(s) orally every 12 hours (23 Dec 2022 10:07)  Aquaphor Healing topical ointment: Apply topically to affected area once a day (23 Dec 2022 10:07)  ascorbic acid 500 mg oral tablet: 1 tab(s) orally once a day (23 Dec 2022 10:07)  Bacid (LAC) oral capsule: 2 cap(s) orally once a day (23 Dec 2022 10:07)  bumetanide 2 mg oral tablet: 1 tab(s) orally 2 times a day (23 Dec 2022 10:07)  gabapentin 100 mg oral capsule: 1 cap(s) orally 3 times a day (23 Dec 2022 10:07)  melatonin 3 mg oral tablet: 1 tab(s) orally once a day (at bedtime), As needed, Insomnia (23 Dec 2022 10:07)  Multiple Vitamins with Minerals oral tablet: 1 tab(s) orally once a day (23 Dec 2022 10:07)  pantoprazole 40 mg oral delayed release tablet: 1 tab(s) orally once a day (before a meal) (23 Dec 2022 10:07)  sacubitril-valsartan 24 mg-26 mg oral tablet: 1 tab(s) orally 2 times a day (23 Dec 2022 10:07)  simethicone 80 mg oral tablet: 2 tab(s) orally every 6 hours, As Needed (23 Dec 2022 10:07)  spironolactone 25 mg oral tablet: 1 tab(s) orally once a day (23 Dec 2022 10:07)  Symbicort 160 mcg-4.5 mcg/inh inhalation aerosol: 2 puff(s) inhaled 2 times a day (23 Dec 2022 10:07)  Ventolin HFA 90 mcg/inh inhalation aerosol: 2 puff(s) inhaled every 6 hours, As Needed (23 Dec 2022 10:07)      Family History: Non-contributory family history of premature cardiovascular atherosclerotic disease    Social History: Unable to obtain    Review of Systems:  Unable to obtain    Physical Exam:  Vitals:        Vital Signs Last 24 Hrs  T(C): 37.8 (23 Dec 2022 10:02), Max: 37.8 (23 Dec 2022 10:02)  T(F): 100 (23 Dec 2022 10:02), Max: 100 (23 Dec 2022 10:02)  HR: 99 (23 Dec 2022 11:44) (99 - 110)  BP: 140/96 (23 Dec 2022 10:45) (131/79 - 146/102)  BP(mean): --  RR: 40 (23 Dec 2022 10:45) (40 - 445)  SpO2: 88% (23 Dec 2022 11:44) (87% - 98%)  Parameters below as of 23 Dec 2022 10:45  Patient On (Oxygen Delivery Method): BiPAP/CPAP  O2 Concentration (%): 100  General: Unresponsive  HEENT: Intubated  Neck: No JVD, no carotid bruit  Lungs: CTAB  CV: RRR, nl S1/S2, no M/R/G  Abdomen: S/NT/ND, +BS  Extremities: No LE edema, no cyanosis  Neuro: AAOx0  Skin: No rash    Labs:                        16.6   9.66  )-----------( 146      ( 23 Dec 2022 10:16 )             54.9     12-23    142  |  100  |  51<H>  ----------------------------<  168<H>  4.7   |  22  |  1.70<H>    Ca    9.7      23 Dec 2022 10:16    TPro  9.2<H>  /  Alb  4.7  /  TBili  1.8<H>  /  DBili  x   /  AST  231<H>  /  ALT  122<H>  /  AlkPhos  434<H>  12-23        PT/INR - ( 23 Dec 2022 10:16 )   PT: 16.9 sec;   INR: 1.46 ratio         PTT - ( 23 Dec 2022 10:16 )  PTT:34.9 sec    ECG/Telemetry: Sinus tachycardia, LBBB

## 2022-12-23 NOTE — CONSULT NOTE ADULT - SUBJECTIVE AND OBJECTIVE BOX
DOROTHY VOSS    SY EDIP 01    Allergies    No Known Allergies    Intolerances    pork (Other)      PAST MEDICAL & SURGICAL HISTORY:  Heart failure, systolic      CAD (coronary artery disease)      Hypertension      Nonischemic cardiomyopathy      COPD, moderate      2019 novel coronavirus disease (COVID-19)      Substance abuse      HLD (hyperlipidemia)      Cardiac LV ejection fraction 10-20%      AICD (automatic cardioverter/defibrillator) present      Cardiac LV ejection fraction 10-20%          FAMILY HISTORY:  FH: lung cancer        Home Medications:  acetaminophen 325 mg oral tablet: 2 tab(s) orally every 6 hours, As needed, Temp greater or equal to 38C (100.4F), Mild Pain (1 - 3) (23 Dec 2022 10:07)  apixaban 5 mg oral tablet: 1 tab(s) orally every 12 hours (23 Dec 2022 10:07)  Aquaphor Healing topical ointment: Apply topically to affected area once a day (23 Dec 2022 10:07)  ascorbic acid 500 mg oral tablet: 1 tab(s) orally once a day (23 Dec 2022 10:07)  Bacid (LAC) oral capsule: 2 cap(s) orally once a day (23 Dec 2022 10:07)  bumetanide 2 mg oral tablet: 1 tab(s) orally 2 times a day (23 Dec 2022 10:07)  gabapentin 100 mg oral capsule: 1 cap(s) orally 3 times a day (23 Dec 2022 10:07)  melatonin 3 mg oral tablet: 1 tab(s) orally once a day (at bedtime), As needed, Insomnia (23 Dec 2022 10:07)  Multiple Vitamins with Minerals oral tablet: 1 tab(s) orally once a day (23 Dec 2022 10:07)  pantoprazole 40 mg oral delayed release tablet: 1 tab(s) orally once a day (before a meal) (23 Dec 2022 10:07)  sacubitril-valsartan 24 mg-26 mg oral tablet: 1 tab(s) orally 2 times a day (23 Dec 2022 10:07)  simethicone 80 mg oral tablet: 2 tab(s) orally every 6 hours, As Needed (23 Dec 2022 10:07)  spironolactone 25 mg oral tablet: 1 tab(s) orally once a day (23 Dec 2022 10:07)  Symbicort 160 mcg-4.5 mcg/inh inhalation aerosol: 2 puff(s) inhaled 2 times a day (23 Dec 2022 10:07)  Ventolin HFA 90 mcg/inh inhalation aerosol: 2 puff(s) inhaled every 6 hours, As Needed (23 Dec 2022 10:07)      MEDICATIONS  (STANDING):  albuterol/ipratropium (CFC free) Inhaler. 1 Puff(s) Inhalation every 6 hours  albuterol/ipratropium for Nebulization. 3 milliLiter(s) Nebulizer once  aspirin  chewable 81 milliGRAM(s) Oral daily  chlorhexidine 0.12% Liquid 15 milliLiter(s) Oral Mucosa every 12 hours  dextrose 5%. 1000 milliLiter(s) (100 mL/Hr) IV Continuous <Continuous>  dextrose 5%. 1000 milliLiter(s) (50 mL/Hr) IV Continuous <Continuous>  dextrose 50% Injectable 25 Gram(s) IV Push once  dextrose 50% Injectable 12.5 Gram(s) IV Push once  dextrose 50% Injectable 25 Gram(s) IV Push once  glucagon  Injectable 1 milliGRAM(s) IntraMuscular once  insulin lispro (ADMELOG) corrective regimen sliding scale   SubCutaneous every 6 hours  methylPREDNISolone sodium succinate Injectable 40 milliGRAM(s) IV Push every 8 hours  pantoprazole  Injectable 40 milliGRAM(s) IV Push daily  piperacillin/tazobactam IVPB.. 3.375 Gram(s) IV Intermittent every 8 hours    MEDICATIONS  (PRN):  dextrose Oral Gel 15 Gram(s) Oral once PRN Blood Glucose LESS THAN 70 milliGRAM(s)/deciliter  fentaNYL    Injectable 100 MICROGram(s) IV Push every 4 hours PRN MV synchr              Vital Signs Last 24 Hrs  T(C): 37.8 (23 Dec 2022 10:02), Max: 37.8 (23 Dec 2022 10:02)  T(F): 100 (23 Dec 2022 10:02), Max: 100 (23 Dec 2022 10:02)  HR: 99 (23 Dec 2022 11:44) (99 - 110)  BP: 140/96 (23 Dec 2022 10:45) (131/79 - 146/102)  BP(mean): --  RR: 40 (23 Dec 2022 10:45) (40 - 445)  SpO2: 88% (23 Dec 2022 11:44) (87% - 98%)    Parameters below as of 23 Dec 2022 10:45  Patient On (Oxygen Delivery Method): BiPAP/CPAP    O2 Concentration (%): 100        Mode: AC/ CMV (Assist Control/ Continuous Mandatory Ventilation), RR (machine): 14, TV (machine): 400, FiO2: 100, PEEP: 5, ITime: 1, MAP: 11, PIP: 31      LABS:                        16.6   9.66  )-----------( 146      ( 23 Dec 2022 10:16 )             54.9         142  |  100  |  51<H>  ----------------------------<  168<H>  4.7   |  22  |  1.70<H>    Ca    9.7      23 Dec 2022 10:16    TPro  9.2<H>  /  Alb  4.7  /  TBili  1.8<H>  /  DBili  x   /  AST  231<H>  /  ALT  122<H>  /  AlkPhos  434<H>      PT/INR - ( 23 Dec 2022 10:16 )   PT: 16.9 sec;   INR: 1.46 ratio         PTT - ( 23 Dec 2022 10:16 )  PTT:34.9 sec  Urinalysis Basic - ( 23 Dec 2022 11:14 )    Color: Yellow / Appearance: Turbid / S.010 / pH: x  Gluc: x / Ketone: Negative  / Bili: Negative / Urobili: Negative mg/dL   Blood: x / Protein: 100 mg/dL / Nitrite: Negative   Leuk Esterase: Moderate / RBC: 6-10 /HPF / WBC 26-50   Sq Epi: x / Non Sq Epi: Occasional / Bacteria: Occasional        ABG - ( 23 Dec 2022 11:54 )  pH, Arterial: 7.05  pH, Blood: x     /  pCO2: 95    /  pO2: 68    / HCO3: 26    / Base Excess: -4.2  /  SaO2: 89.3                WBC:  WBC Count: 9.66 K/uL ( @ 10:16)      MICROBIOLOGY:  RECENT CULTURES:              PT/INR - ( 23 Dec 2022 10:16 )   PT: 16.9 sec;   INR: 1.46 ratio         PTT - ( 23 Dec 2022 10:16 )  PTT:34.9 sec    Sodium:  Sodium, Serum: 142 mmol/L ( @ 10:16)      1.70 mg/dL  @ 10:16      Hemoglobin:  Hemoglobin: 16.6 g/dL ( @ 10:16)      Platelets: Platelet Count - Automated: 146 K/uL ( @ 10:16)      LIVER FUNCTIONS - ( 23 Dec 2022 10:16 )  Alb: 4.7 g/dL / Pro: 9.2 g/dL / ALK PHOS: 434 U/L / ALT: 122 U/L DA / AST: 231 U/L / GGT: x             Urinalysis Basic - ( 23 Dec 2022 11:14 )    Color: Yellow / Appearance: Turbid / S.010 / pH: x  Gluc: x / Ketone: Negative  / Bili: Negative / Urobili: Negative mg/dL   Blood: x / Protein: 100 mg/dL / Nitrite: Negative   Leuk Esterase: Moderate / RBC: 6-10 /HPF / WBC 26-50   Sq Epi: x / Non Sq Epi: Occasional / Bacteria: Occasional        RADIOLOGY & ADDITIONAL STUDIES:      MICROBIOLOGY:  RECENT CULTURES:

## 2022-12-23 NOTE — H&P ADULT - NSHPLABSRESULTS_GEN_ALL_CORE
Labs:                          16.6   9.66  )-----------( 146      ( 23 Dec 2022 10:16 )             54.9           142  |  100  |  51<H>  ----------------------------<  168<H>  4.7   |  22  |  1.70<H>    Ca    9.7      23 Dec 2022 10:16    TPro  9.2<H>  /  Alb  4.7  /  TBili  1.8<H>  /  DBili  x   /  AST  231<H>  /  ALT  122<H>  /  AlkPhos  434<H>            ABG - ( 23 Dec 2022 11:54 )  pH, Arterial: 7.05  pH, Blood: x     /  pCO2: 95    /  pO2: 68    / HCO3: 26    / Base Excess: -4.2  /  SaO2: 89.3      Urinalysis Basic - ( 23 Dec 2022 11:14 )  Color: Yellow / Appearance: Turbid / S.010 / pH: x  Gluc: x / Ketone: Negative  / Bili: Negative / Urobili: Negative mg/dL   Blood: x / Protein: 100 mg/dL / Nitrite: Negative   Leuk Esterase: Moderate / RBC: 6-10 /HPF / WBC 26-50   Sq Epi: x / Non Sq Epi: Occasional / Bacteria: Occasional    PT/INR - ( 23 Dec 2022 10:16 )   PT: 16.9 sec;   INR: 1.46 ratio      PTT - ( 23 Dec 2022 10:16 )  PTT:34.9 sec    Lactate Trend   @ 10:16 Lactate:3.2     CAPILLARY BLOOD GLUCOSE      EKG:   Personally Reviewed:  [ ] YES     Imaging:   CXR post intubation - pneumothorax,   cxr post code - Left defibrillator again noted.  Left chest tube with tip overlying left subapical region medially.  Enteric tube courses below diaphragm -tip is off the film.  ET tube tip is above the jennie  Heart/Vascular: Stable cardiomegaly.  Pulmonary: No appreciable left-sided pneumothorax. Persistent partly loculated right pleural effusion. Subcutaneous emphysema in left chest. Diaphragmatic subcutaneous emphysema outlines the lower heart border.  Personally Reviewed:  [ ] YES

## 2022-12-23 NOTE — CONSULT NOTE ADULT - SUBJECTIVE AND OBJECTIVE BOX
Date/Time Patient Seen:  		  Referring MD:   Data Reviewed	       Patient is a 62y old  Male who presents with a chief complaint of resp failure (23 Dec 2022 14:46)      Subjective/HPI  vs noted  labs reviewed  imaging reviewed  h and p reviewed  er provider note reviewed  s/p ct  s/p intubation  cxr noted  spoke with  Meal - emergency contact     History of Present Illness:  Reason for Admission: resp failure  History of Present Illness:   62M CAD, Dilated cardiomyopathy, S/p AICD, Afib/flutter, h/o substance abuse, CKD baseline creat approx 1.4 last admitted to [facility] with MSSA bacteremia, and CHF.  Sent from [facility] SNF with acute hypoxic respiratory failure.  Was initially on BiPAP then became hypoxic.  Anesthesia intubated patient.  On post intubation Xray pneumothorax evident.  Patient with PEA arrest.  Chest tube placed by ED physician.  ROSC obtained.     Patient unable to give further history.    PAST MEDICAL & SURGICAL HISTORY:  Heart failure, systolic    CAD (coronary artery disease)    Hypertension    Nonischemic cardiomyopathy    COPD, moderate    2019 novel coronavirus disease (COVID-19)    Substance abuse    HLD (hyperlipidemia)    Cardiac LV ejection fraction 10-20%    No significant past surgical history    AICD (automatic cardioverter/defibrillator) present    Cardiac LV ejection fraction 10-20%    PAST SURGICAL HISTORY:  AICD (automatic cardioverter/defibrillator) present     Cardiac LV ejection fraction 10-20%.     FAMILY HISTORY:  FH: lung cancer.     Social History:  · Substance use	Yes  · Alcohol use	no  · Substance use	poss history, now lives in SNF  · Tobacco use	as per SNF paperwork still smokes but no further information available.     Tobacco Screening:  · Core Measure Site	No  · Has the patient used tobacco in the past 30 days?	Unable to assess due to patient's cognitive impairment    Risk Assessment:    Present on Admission:  Deep Venous Thrombosis	no  Pulmonary Embolus	no     HIV Screening:  · In accordance with [place] State law, we offer every patient who comes to our ED an HIV test. Would you like to be tested today?	Unable to answer due to medical condition/unresponsive/etc...        Medication list         MEDICATIONS  (STANDING):  albuterol/ipratropium (CFC free) Inhaler. 1 Puff(s) Inhalation every 6 hours  albuterol/ipratropium for Nebulization. 3 milliLiter(s) Nebulizer once  aspirin  chewable 81 milliGRAM(s) Oral daily  chlorhexidine 0.12% Liquid 15 milliLiter(s) Oral Mucosa every 12 hours  dextrose 5%. 1000 milliLiter(s) (100 mL/Hr) IV Continuous <Continuous>  dextrose 5%. 1000 milliLiter(s) (50 mL/Hr) IV Continuous <Continuous>  dextrose 50% Injectable 25 Gram(s) IV Push once  dextrose 50% Injectable 12.5 Gram(s) IV Push once  dextrose 50% Injectable 25 Gram(s) IV Push once  glucagon  Injectable 1 milliGRAM(s) IntraMuscular once  insulin lispro (ADMELOG) corrective regimen sliding scale   SubCutaneous every 6 hours  methylPREDNISolone sodium succinate Injectable 40 milliGRAM(s) IV Push every 8 hours  pantoprazole  Injectable 40 milliGRAM(s) IV Push daily  piperacillin/tazobactam IVPB.. 3.375 Gram(s) IV Intermittent every 8 hours    MEDICATIONS  (PRN):  dextrose Oral Gel 15 Gram(s) Oral once PRN Blood Glucose LESS THAN 70 milliGRAM(s)/deciliter  fentaNYL    Injectable 100 MICROGram(s) IV Push every 4 hours PRN MV synchr         Vitals log        ICU Vital Signs Last 24 Hrs  T(C): 36.9 (23 Dec 2022 14:38), Max: 37.8 (23 Dec 2022 10:02)  T(F): 98.4 (23 Dec 2022 14:38), Max: 100 (23 Dec 2022 10:02)  HR: 95 (23 Dec 2022 14:38) (95 - 110)  BP: 82/64 (23 Dec 2022 14:38) (82/64 - 146/102)  BP(mean): 72 (23 Dec 2022 14:38) (72 - 72)  ABP: --  ABP(mean): --  RR: 22 (23 Dec 2022 14:38) (22 - 445)  SpO2: 100% (23 Dec 2022 14:38) (87% - 100%)    O2 Parameters below as of 23 Dec 2022 14:38  Patient On (Oxygen Delivery Method): conventional ventilator    O2 Concentration (%): 100         Mode: AC/ CMV (Assist Control/ Continuous Mandatory Ventilation)  RR (machine): 20  TV (machine): 400  FiO2: 100  PEEP: 5  ITime: 0.8  MAP: 9  PIP: 16      Input and Output:  I&O's Detail    23 Dec 2022 07:01  -  23 Dec 2022 15:32  --------------------------------------------------------  IN:    IV PiggyBack: 250 mL  Total IN: 250 mL    OUT:  Total OUT: 0 mL    Total NET: 250 mL          Lab Data                        16.6   9.66  )-----------( 146      ( 23 Dec 2022 10:16 )             54.9     12-23    142  |  100  |  51<H>  ----------------------------<  168<H>  4.7   |  22  |  1.70<H>    Ca    9.7      23 Dec 2022 10:16    TPro  9.2<H>  /  Alb  4.7  /  TBili  1.8<H>  /  DBili  x   /  AST  231<H>  /  ALT  122<H>  /  AlkPhos  434<H>  12-23    ABG - ( 23 Dec 2022 11:54 )  pH, Arterial: 7.05  pH, Blood: x     /  pCO2: 95    /  pO2: 68    / HCO3: 26    / Base Excess: -4.2  /  SaO2: 89.3                    Review of Systems	    resp failure  intubated    Objective     Physical Examination    heart s1s2  lung dc BS      Pertinent Lab findings & Imaging      Leo:  NO   Adequate UO     I&O's Detail    23 Dec 2022 07:01  -  23 Dec 2022 15:32  --------------------------------------------------------  IN:    IV PiggyBack: 250 mL  Total IN: 250 mL    OUT:  Total OUT: 0 mL    Total NET: 250 mL               Discussed with:     Cultures:	        Radiology        ACC: [id number] EXAM:  XR CHEST PORTABLE URGENT 1V                          PROCEDURE DATE:  12/23/2022          INTERPRETATION:  INDICATION: Assess chest tube placement    COMPARISON: Earlier 12/23/22 exam done 11:59AM    Technique: AP radiograph of the chest    FINDINGS:  Left defibrillator again noted.  Left chest tube with tip overlying left subapical region medially.  Enteric tube courses below diaphragm -tip is off the film.  ET tube tip is above the jennie  Heart/Vascular: Stable cardiomegaly.  Pulmonary: No appreciable left-sided pneumothorax. Persistent partly   loculated right pleural effusion. Subcutaneous emphysema in left chest.   Diaphragmatic subcutaneous emphysema outlines the lower heart border.  Bones: No acute bony finding.    Impression:  Left chest tube in place - no appreciable left-sided pneumothorax.        --- End of Report ---            ARI WONG MD; Attending Radiologist  This document has been electronically signed. Dec 23 2022 12:58PM

## 2022-12-23 NOTE — CONSULT NOTE ADULT - ASSESSMENT
Initial evaluation/Pulmonary Critical Care consultation requested on 12/23/2022  by Dr MATHEW   from Dr Goins   Patient examined chart reviewed    HOSPITAL ADMISSION   PATIENT CAME  FROM (if information available)      REVIEW OF SYMPTOMS      Able to give (reliable) ROS  NO     PHYSICAL EXAM    HEENT Unremarkable  atraumatic   RESP Fair air entry EXP prolonged    Harsh breath sound Resp distres mild   CARDIAC S1 S2 No S3     NO JVD    ABDOMEN SOFT BS PRESENT NOT DISTENDED No hepatosplenomegaly   PEDAL EDEMA present No calf tenderness  NO rash       AGE/SEX.   62 m   DOA.  12/23/2022  CC .  12/23/2022   respiratory distress      GENERAL DATA .   GOC.   12/23/2022 full code  ALLGY.     nka                  WT.                BMI.                 ICU STAY. .. 12/23/2022  COVID. ..  12/23/2022 scv2 (-)   BEST PRACTICE ISSUES.    HOB ELEVATN. Yes  DVT PPLX. ..    12/23/2022 hpsc   WHITMORE PPLX. ..    12/23/2022 protonix 40   INFN PPLX. ..  12/23/2022 chlorhexidine .12%   SP SW VIVIAN.         DIET.  ..  12/23/2022 npo   IV fl...      PROCEDURES...   12/23/2022  l chest tube   12/23/2022 intubated   12/23/2022 reed       ABGS.  12/23/2022 11 a 100% 705/95/68   VS/ PO/IO/ VENT/ DRIPS.   12/23/2022 107 140/100   12/23/2022 bpap    12/23/2022 14/400/5/100        PATIENT PRESENTATION.  62 m with short of breath found to have tension pneumothorax chest tube inserted er md admitted icu   Pulm crit care consulted 12/23/2022     ER meds given   12/23/2022 lasix 40  12/23/2022 duoneb     PREV ADMISSION 2/11-2/25/2022 [facility]    MAIN ISSUES.  62 m doa 12/23/2022 resp distress  PMH COPD  PMH COVID (+) 2/11/2022   PMH S aureus bacteremia mssa 2/11   .. Ancef 2/12/2022 Dr Phan  PMH AICD  PMH HFREF  .. ECHO 11/20/2021 ef 20%  PMH A fib (on eliquis)     Intubation 12/23/2022  Vent mgmt 12/23/2022   sedation.  .. 12/23/2022 fentanyl 100.4p   Pneumothorax.  .. 12/23/2022 cxr tension pntx  .. 12/23/2022 chets tube inserted   Infection.  Possible aspn pneum 12/23/2022  Possible uti 12/23/2022   .. ua 12/23/2022 w 26-50   .. 12/23/2022 zosyn   Lacticemia.  .. la 12/23/2022 la 3.2   elevated lfts.  .. LFTS 12/23/2022                     DERRICK.  .. Cr 12/23/2022 Cr 1.7     HOME MEDS INCLUDE.  apixaba 5.2   sacubitril valsartan 24 26    bumetanide 2   spironolactone 25  protonix 40   amiodarone 200     PROBLEMS ASSESSMENT RECOMMENDATIONS.  Intubation 12/23/2022  Vent mgmnt.   .. bundle dsv dsbt ltvv pplat 30 (-) PO2 60 (+) ph 7.3 (+)  sedation.  .. 12/23/2022 fentanyl 100.4p   COPD.  .. 12/23/2022 combivent .4     .. 12/23/2022 solumed 40.3   .. 12/23/2022 will taper to 40 on 12/24   Code 12/23/2022    .. 12/23/2022 was  restless when he came in No defib  .. 12/23/2022 coded at 12.13 p pea arrest about 25 min rosc   .. 12/23/2022 monitor neuro recovery  Pneumothorax.  .. 12/23/2022 cxr tension pntx  .. 12/23/2022 chets tube inserted  .. follow clinically and with imaging   Infection.   Possible aspn pneum 12/23/2022  Possible uti 12/23/2022   .. W 12/23/2022 w 9.6   .. ua 12/23/2022 w 26-50   .. flu ab 12/23/2022 (-)  .. rsv 12/23/2022 (-)   .. 12/23/2022 check ct forman  .. bsab follow cult   Lacticemia.  .. la 12/23/2022 la 3.2    .. Fluids and monitor  CAD.  .. 12/23/2022 asa 81   elevated lfts.  .. LFTS 12/23/2022                     .. 12/23/2022 check ct cap  .. 12/23/2022 check hep profile   .. 12/23/2022 follow serially  DERRICK.  .. Cr 12/23/2022 Cr 1.7   .. fluids and serial monitoring  AMS.  .. 12/23/2022 check ct head    TIME SPENT   Over 55 minutes aggregate critical care time spent on encounter; activities included   direct patient care, counseling and/or coordinating care reviewing notes, lab data/ imaging , discussion with multidisciplinary team/ patient  /family and explaining in detail risks, benefits, alternatives  of the recommendations     BIPIN DE LA CRUZ 61 m 12/23/2022 1962 DR KELVIN VANESSA

## 2022-12-23 NOTE — H&P ADULT - ASSESSMENT
62M CAD, Dilated cardiomyopathy, S/p AICD, Afib/flutter, h/o substance abuse,  presented with acute hypoxic and hypercarbic respiratory failure associated with pneumothorax.        Acute respiratory failure  - continue vent management  - continue chest tube to suction.  Discussed case with CT surgery - no need for other acute intervention at this time. Unable to do onsite evaluation, but if requires intervention will transfer patient to [facility].   monitor serial CXR.  Get Chest CT when able.    - repeat ABG post code  - May have COPD exacerbation - steroids, duoneb ordered.  - Empiric Abx for now, unclear if infection present.   - Dr Goins to evaluate patient    Cardiomyopathy  - current cardiac issues appear to be secondary to pulm issues at this time  - hold medications for now   - hold eliquis pending possible further procedures and hospital course    DM2  - not on meds at [facility]  - FS monitoring with lispro coverage scale while critically ill    DERRICK on CKD  - Due to above, possible HF as well  - monitor creatinine, fluid status unclear at this time if patient requires further diuresis or IVF hydration    Prophylactic measure  - DVT proph: heparin SQ for now  - GI proph: protonix    D/w Dr Buster EMANUEL,    62M CAD, Dilated cardiomyopathy, S/p AICD, Afib/flutter, h/o substance abuse,  presented with acute hypoxic and hypercarbic respiratory failure associated with pneumothorax.        Acute respiratory failure  - continue vent management  - continue chest tube to suction.  Discussed case with CT surgery - no need for other acute intervention at this time. Unable to do onsite evaluation, but if requires intervention will transfer patient to [facility].   monitor serial CXR.  Get Chest CT when able.    - repeat ABG post code  - May have COPD exacerbation - steroids, duoneb ordered.  - Empiric Abx for now, unclear if infection present.   - Dr Goins to evaluate patient    Cardiomyopathy  - current cardiac issues appear to be secondary to pulm issues at this time  - hold medications for now   - hold eliquis pending possible further procedures and hospital course    DM2  - not on meds at [facility]  - FS monitoring with lispro coverage scale while critically ill    DERRICK on CKD  - Due to above, possible HF as well  - monitor creatinine, fluid status unclear at this time if patient requires further diuresis or IVF hydration    Prophylactic measure  - DVT proph: heparin SQ for now  - GI proph: protonix    D/w Dr Buster EMANUEL, Dr Zapien

## 2022-12-23 NOTE — H&P ADULT - HISTORY OF PRESENT ILLNESS
62M CAD, Dilated cardiomyopathy, S/p AICD, Afib/flutter, h/o substance abuse, CKD baseline creat approx 1.4 last admitted to [facility] with MSSA bacteremia, and CHF.  Sent from [facility] SNF with acute hypoxic respiratory failure.  Was initially on BiPAP then became hypoxic.  Anesthesia intubated patient.  On post intubation Xray pneumothorax evident.  Patient with PEA arrest.  Chest tube placed by ED physician.  ROSC obtained.     Patient unable to give further history.

## 2022-12-23 NOTE — ED PROVIDER NOTE - CLINICAL SUMMARY MEDICAL DECISION MAKING FREE TEXT BOX
Patient is a 62-year-old gentleman who presents to the emergency room in significant respiratory distress.  Past medical history of COVID-19, CAD, dilated cardiomyopathy with an EF of 10 to 20% status post AICD, history of COPD, history of hyperlipidemia, hypertension, prior history of substance abuse.  At this time patient is unable to provide history.  Per EMS they received a call from [facility] stating that patient was in respiratory distress on their arrival patient was found to be hypoxic and altered.  During patient was transferred to the emergency room for further work-up.  On arrival patient was noted to have increased work of breathing and hypoxia.  Respiratory at bedside.  BiPAP applied with improvement of saturation and work of breathing.  Patient does remain lethargic but is maintaining an airway at this time.  Screening septic work-up was ordered we will check procalcitonin BNP will obtain ABG check UA urine culture and will image chest.  We will also obtain EKG.  At this time patient is not febrile and there is no active source of overt infection so he could possibly be suffering from volume overload.  Will avoid aggressive hydration at this time continue BiPAP.  We will diurese and provide nebs.  There is no active source of infection and antibiotics held at this time.  Prior to chest x-ray being shot and additional.  Critical patient came into the department this patient subsequently went into cardiac arrest.  While I was in the room attending to this patient received notification from the nursing staff that patient began to develop worsening respiratory status with increased lethargy and appeared to possibly need intubation.  So was unable to leave the current cardiac arrest anesthesiology and the hospitalist were called to the patient's bedside along with the eICU.  Ros with the other patient the decision was made by the use staff to intubate the patient.  Patient was intubated prior to my arrival into the room.  I arrived again shortly after the intubation.  Patient initially seems stable x-ray was being shot for postintubation almost immediately after this patient went into cardiac arrest.  X-ray was reviewed patient was noted to have a significant tension pneumo.  Needle decompression was performed.  ACLS was initiated and emergent chest tube was placed on patient's left lateral chest wall with ROSC.  Antibiotics are now initiated as there is a high likelihood that patient may have developed an aspiration during this event especially prior to the intubation.  Hospitalist remained at bedside patient accepted to the ICU.  Repeat chest imaging ordered chest tube in place with significant improvement in pneumothorax.  Cardiology pulmonary and palliative care consults were placed.  Patient will likely require CT imaging of his head chest abdomen and pelvis but as he is too unstable at this time in the CAT scan machine is not actively working at [place] the decision was made to admit the patient to the ICU and to obtain CT imaging later when patient is more stable or the CAT scan machine begins to function again at [place].  As patient was initially awake on arrival to the emergency room there is low suspicion for intracranial injury is likely more primarily of pulmonary process.

## 2022-12-23 NOTE — H&P ADULT - NSHPPHYSICALEXAM_GEN_ALL_CORE
PHYSICAL EXAM:  Vital Signs Last 24 Hrs  T(C): 37.8 (23 Dec 2022 10:02), Max: 37.8 (23 Dec 2022 10:02)  T(F): 100 (23 Dec 2022 10:02), Max: 100 (23 Dec 2022 10:02)  HR: 99 (23 Dec 2022 11:44) (99 - 110)  BP: 140/96 (23 Dec 2022 10:45) (131/79 - 146/102)  BP(mean): --  RR: 40 (23 Dec 2022 10:45) (40 - 445)  SpO2: 88% (23 Dec 2022 11:44) (87% - 98%)    Parameters below as of 23 Dec 2022 10:45  Patient On (Oxygen Delivery Method): BiPAP/CPAP    O2 Concentration (%): 100    GENERAL: respiratory distress  HEAD:  Atraumatic, Normocephalic  EYES:  conjunctiva and sclera clear  ENMT: intubated  NERVOUS SYSTEM:  no response to painful stimuli, is overbreathing the vent  CHEST/LUNG: diminished bilaterally  HEART: Regular rate and rhythm;   ABDOMEN: Soft, Nontender, Nondistended; Bowel sounds hypoactive  EXTREMITIES:  2+ Peripheral Pulses, No clubbing, cyanosis, or edema  SKIN: small sacral lesion

## 2022-12-23 NOTE — CONSULT NOTE ADULT - ASSESSMENT
The patient is a 62 year old male with a history of CAD, chronic systolic heart failure s/p ICD, atrial flutter s/p DCCV, substance abuse, CKD who is admitted with respiratory failure in the setting of tension pneumothorax complicated by cardiac arrest.    Plan:  - ECG with sinus tachycardia and known LBBB  - Echo 2/22 with severely reduced LV (EF 10%) and RV function, mod MR, mod TR, mild pulm HTN  - Hold all heart failure medications for now (BB, Entresto, spironolactone, bumetanide)  - BP ok for now but may trend down with sedation  - Hold apixaban given DERRICK and likely will need additional procedures  - Continue aspirin 81 mg daily  - Chest tube in place - will need to be replaced  - IV antibiotics  - Sedation  - Mechanical ventilation  - ICU care  - Overall poor prognosis      35 minutes of critical care time spent with the patient and coordinating care with nursing, hospitalist, and consultants. Patient is critically ill requiring ICU care. The patient is high risk for deterioration and death.

## 2022-12-23 NOTE — ED PROVIDER NOTE - OBJECTIVE STATEMENT
Patient is a 62-year-old gentleman who presents to the emergency room in significant respiratory distress.  Past medical history of COVID-19, CAD, dilated cardiomyopathy with an EF of 10 to 20% status post AICD, history of COPD, history of hyperlipidemia, hypertension, prior history of substance abuse.  At this time patient is unable to provide history.  Per EMS they received a call from [facility] stating that patient was in respiratory distress on their arrival patient was found to be hypoxic and altered.  During patient was transferred to the emergency room for further work-up.  On arrival patient was noted to have increased work of breathing and hypoxia.  Respiratory at bedside.  BiPAP applied with improvement of saturation and work of breathing.  Patient does remain lethargic but is maintaining an airway at this time.

## 2022-12-24 NOTE — DIETITIAN INITIAL EVALUATION ADULT - NSFNSPHYEXAMSKINFT_GEN_A_CORE
Pressure Injury 1: scrotum, Stage II  Pressure Injury 2: none, none  Pressure Injury 3: none, none  Pressure Injury 4: none, none  Pressure Injury 5: none, none  Pressure Injury 6: none, none  Pressure Injury 7: none, none  Pressure Injury 8: none, none  Pressure Injury 9: none, none  Pressure Injury 10: none, none  Pressure Injury 11: none, none

## 2022-12-24 NOTE — PROGRESS NOTE ADULT - SUBJECTIVE AND OBJECTIVE BOX
[facility], Division of Infectious Diseases  MARINA Mccray Y. Patel, S. Shah, G. [facility]  [phone]    Name: DOROTHY VOSS  Age: 62y  Gender: Male  MRN: [id number]    Interval History:  Patient seen and examined at bedside in SPCU  Intubated  Notes reviewed    Antibiotics:  piperacillin/tazobactam IVPB.. 3.375 Gram(s) IV Intermittent every 8 hours      Medications:  albuterol/ipratropium (CFC free) Inhaler. 1 Puff(s) Inhalation every 6 hours  albuterol/ipratropium for Nebulization. 3 milliLiter(s) Nebulizer once  aspirin  chewable 81 milliGRAM(s) Oral daily  chlorhexidine 0.12% Liquid 15 milliLiter(s) Oral Mucosa every 12 hours  dextrose 5%. 1000 milliLiter(s) IV Continuous <Continuous>  dextrose 5%. 1000 milliLiter(s) IV Continuous <Continuous>  dextrose 50% Injectable 25 Gram(s) IV Push once  dextrose 50% Injectable 12.5 Gram(s) IV Push once  dextrose 50% Injectable 25 Gram(s) IV Push once  dextrose Oral Gel 15 Gram(s) Oral once PRN  fentaNYL    Injectable 100 MICROGram(s) IV Push every 4 hours PRN  glucagon  Injectable 1 milliGRAM(s) IntraMuscular once  heparin   Injectable 5000 Unit(s) SubCutaneous every 12 hours  insulin lispro (ADMELOG) corrective regimen sliding scale   SubCutaneous every 6 hours  methylPREDNISolone sodium succinate Injectable 40 milliGRAM(s) IV Push daily  pantoprazole  Injectable 40 milliGRAM(s) IV Push daily  piperacillin/tazobactam IVPB.. 3.375 Gram(s) IV Intermittent every 8 hours      Review of Systems:  unable to obtain    Allergies: No Known Allergies    For details regarding the patient's past medical history, social history, family history, and other miscellaneous elements, please refer the initial infectious diseases consultation and/or the admitting history and physical examination for this admission.    Objective:  Vitals:   T(C): 36.7 (22 @ 16:45), Max: 37.2 (22 @ 08:34)  HR: 89 (22 @ 16:15) (89 - 118)  BP: 94/71 (22 @ 13:00) (87/66 - 128/94)  RR: 20 (22 @ 13:00) (17 - 22)  SpO2: 100% (22 @ 16:15) (60% - 100%)    Physical Examination:  General: elderly M, intubated  HEENT: NC/AT, ETT in place  Lungs: MV breath sounds  Chest: L CT in place  Heart: RRR  Abdomen: Soft.   Extremities: No cyanosis or clubbing. No edema.     Laboratory Studies:  CBC:                       15.4   21.77 )-----------( 176      ( 24 Dec 2022 07:03 )             48.0     CMP:     147<H>  |  105  |  76<H>  ----------------------------<  182<H>  4.5   |  30  |  3.19<H>    Ca    9.1      24 Dec 2022 07:03  Phos  4.1       Mg     2.2         TPro  7.5  /  Alb  3.7  /  TBili  2.5<H>  /  DBili  1.6<H>  /  AST  2524<H>  /  ALT  2066<H>  /  AlkPhos  390<H>  12-24    LIVER FUNCTIONS - ( 24 Dec 2022 07:03 )  Alb: 3.7 g/dL / Pro: 7.5 g/dL / ALK PHOS: 390 U/L / ALT: 2066 U/L DA / AST: 2524 U/L / GGT: x           Urinalysis Basic - ( 23 Dec 2022 11:14 )    Color: Yellow / Appearance: Turbid / S.010 / pH: x  Gluc: x / Ketone: Negative  / Bili: Negative / Urobili: Negative mg/dL   Blood: x / Protein: 100 mg/dL / Nitrite: Negative   Leuk Esterase: Moderate / RBC: 6-10 /HPF / WBC 26-50   Sq Epi: x / Non Sq Epi: Occasional / Bacteria: Occasional        Microbiology: reviewed        Radiology: reviewed    < from: Xray Chest 1 View- PORTABLE-Routine (Xray Chest 1 View- PORTABLE-Routine in AM.) (22 @ 08:08) >    ACC: [id number] EXAM:  XR CHEST PORTABLE ROUTINE 1V                          PROCEDURE DATE:  2022          INTERPRETATION:  HISTORY: Admitting Dxs: AURELIO96.01 RESP DISTRESS;  f/u tube   placement, pneumo;  TECHNIQUE: Portable frontal view of the chest, 1 view.  COMPARISON: Prior day at 12:44 PM.  FINDINGS/  IMPRESSION:      The endotracheal tube is above the jennie. The nasogastric tube courses   below the left hemidiaphragm, tip off edge of film. A cardiac device   overlies and obscures the left hemithorax with lead in place. Pacer pads   overlie the heart  HEART:  Enlarged  LUNGS: Small right effusion and basilar infiltrate. Left chest tube in   place. No pneumothorax. Subcutaneous emphysema has improved.  BONES: within normal limits      --- End of Report ---            EDGARD LOWERY MD; Attending Interventional Radiologist  This document has been electronically signed. Dec 24 2022  9:05AM    < end of copied text >  < from: CT Chest No Cont (22 @ 21:19) >    ******PRELIMINARY REPORT******      ******PRELIMINARY REPORT******       ACC: [id number] EXAM:  CT CHEST                          PROCEDURE DATE:  2022    ******PRELIMINARY REPORT******      ******PRELIMINARY REPORT******           INTERPRETATION:  chest - resolved tension ptx. right lower lobe   consolidation w/ effusion. pneumomediastinum.  abd/pelv: intraperitoneal and retroperitoneal free air. No discrete bowel   abnormality; given add'l chest findings, consider barotrauma as etiology.   suspect cystitis, single focus of intramural air likely related to   instrumentation.  Results relayed to DONNA Singer at 4:28 AM.        ******PRELIMINARY REPORT******      ******PRELIMINARY REPORT******         JOSELUIS MATHEWS MD; Attending Radiologist  This document is a PRELIMINARY interpretation and is pending final   attending approval. Dec 24 2022  4:33AM    < end of copied text >

## 2022-12-24 NOTE — PROGRESS NOTE ADULT - ASSESSMENT
62M CAD, Dilated cardiomyopathy, S/p AICD, Afib/flutter, h/o substance abuse,  presented with acute hypoxic and hypercarbic respiratory failure associated with pneumothorax which lled to cardiac arrest       Acute respiratory failure  - continue vent management  - continue chest tube to suction.  Discussed case with CT surgery - no need for other acute intervention at this time. Unable to do onsite evaluation, but if requires intervention will transfer patient to [facility].   monitor serial CXR.  Get Chest CT when able.    - repeat ABG post code  - May have COPD exacerbation - steroids, duoneb ordered.  - Empiric Abx for now, unclear if infection present.   - Dr Goins to evaluate patient    Cardiomyopathy  - current cardiac issues appear to be secondary to pulm issues at this time  - hold medications for now   - hold eliquis pending possible further procedures and hospital course    DM2  - not on meds at [facility]  - FS monitoring with lispro coverage scale while critically ill    DERRICK on CKD  - Due to above, possible HF as well  - monitor creatinine, fluid status unclear at this time if patient requires further diuresis or IVF hydration    Prophylactic measure  - DVT proph: heparin SQ for now  - GI proph: protonix    D/w ADELFO, Dr Goins, Dr Zapien

## 2022-12-24 NOTE — DIETITIAN INITIAL EVALUATION ADULT - REASON FOR ADMISSION
As per H&P "The pt is a History of Present Illness:   62M CAD, Dilated cardiomyopathy, S/p AICD, Afib/flutter, h/o substance abuse, CKD baseline creat approx 1.4 last admitted to [facility] with MSSA bacteremia, and CHF.  Sent from [facility] SNF with acute hypoxic respiratory failure.  Was initially on BiPAP then became hypoxic.  Anesthesia intubated patient.  On post intubation Xray pneumothorax evident.  Patient with PEA arrest.  Chest tube placed by ED physician. ROSC obtained. Patient unable to give further history."

## 2022-12-24 NOTE — PROGRESS NOTE ADULT - SUBJECTIVE AND OBJECTIVE BOX
Patient is a 62y old  Male who presents with a chief complaint of resp failure (24 Dec 2022 08:53)        HPI:  62M CAD, Dilated cardiomyopathy, S/p AICD, Afib/flutter, h/o substance abuse, CKD baseline creat approx 1.4 last admitted to [facility] with MSSA bacteremia, and CHF.  Sent from [facility] SNF with acute hypoxic respiratory failure.  Was initially on BiPAP then became hypoxic.  Anesthesia intubated patient.  On post intubation Xray pneumothorax evident.  Patient with PEA arrest.  Chest tube placed by ED physician.  ROSC obtained.     Patient unable to give further history.     (23 Dec 2022 13:02)      SUBJECTIVE & OBJECTIVE: Pt seen and examined at bedside. nad    PHYSICAL EXAM:  T(C): 37.2 (22 @ 08:34), Max: 37.2 (22 @ 08:34)  HR: 93 (22 @ 08:33) (91 - 118)  BP: 105/76 (22 @ 08:00) (81/68 - 694/-)  RR: 18 (22 @ 08:00) (17 - 40)  SpO2: 99% (22 @ 08:33) (60% - 100%)  Wt(kg): -- Height (cm): 175.3 ( @ 19:04)  Weight (kg): 81.6 ( @ 19:04)  BMI (kg/m2): 26.6 ( @ 19:04)  BSA (m2): 1.98 ( @ 19:04)  GENERAL: NAD, well-groomed, well-developed  NECK: Supple, No JVD  NERVOUS SYSTEM:  Alert   CHEST/LUNG: Clear to auscultation bilaterally; No rales, rhonchi, wheezing, or rubs  HEART: Regular rate and rhythm; No murmurs, rubs, or gallops  ABDOMEN: Soft, Nontender, Nondistended; Bowel sounds present  EXTREMITIES:  2+ Peripheral Pulses, No clubbing, cyanosis, or edema        MEDICATIONS  (STANDING):  albuterol/ipratropium (CFC free) Inhaler. 1 Puff(s) Inhalation every 6 hours  albuterol/ipratropium for Nebulization. 3 milliLiter(s) Nebulizer once  aspirin  chewable 81 milliGRAM(s) Oral daily  chlorhexidine 0.12% Liquid 15 milliLiter(s) Oral Mucosa every 12 hours  dextrose 5%. 1000 milliLiter(s) (100 mL/Hr) IV Continuous <Continuous>  dextrose 5%. 1000 milliLiter(s) (50 mL/Hr) IV Continuous <Continuous>  dextrose 50% Injectable 25 Gram(s) IV Push once  dextrose 50% Injectable 12.5 Gram(s) IV Push once  dextrose 50% Injectable 25 Gram(s) IV Push once  glucagon  Injectable 1 milliGRAM(s) IntraMuscular once  heparin   Injectable 5000 Unit(s) SubCutaneous every 12 hours  insulin lispro (ADMELOG) corrective regimen sliding scale   SubCutaneous every 6 hours  methylPREDNISolone sodium succinate Injectable 40 milliGRAM(s) IV Push every 8 hours  pantoprazole  Injectable 40 milliGRAM(s) IV Push daily  piperacillin/tazobactam IVPB.. 3.375 Gram(s) IV Intermittent every 8 hours    MEDICATIONS  (PRN):  dextrose Oral Gel 15 Gram(s) Oral once PRN Blood Glucose LESS THAN 70 milliGRAM(s)/deciliter  fentaNYL    Injectable 100 MICROGram(s) IV Push every 4 hours PRN MV synchr      LABS:                        15.4   21.77 )-----------( 176      ( 24 Dec 2022 07:03 )             48.0     12-24    147<H>  |  105  |  76<H>  ----------------------------<  182<H>  4.5   |  30  |  3.19<H>    Ca    9.1      24 Dec 2022 07:03  Phos  4.1     12-24  Mg     2.2     12-24    TPro  7.5  /  Alb  3.7  /  TBili  2.5<H>  /  DBili  1.6<H>  /  AST  2524<H>  /  ALT  2066<H>  /  AlkPhos  390<H>  12-24    PT/INR - ( 24 Dec 2022 07:03 )   PT: 37.2 sec;   INR: 3.12 ratio         PTT - ( 23 Dec 2022 10:16 )  PTT:34.9 sec  Urinalysis Basic - ( 23 Dec 2022 11:14 )    Color: Yellow / Appearance: Turbid / S.010 / pH: x  Gluc: x / Ketone: Negative  / Bili: Negative / Urobili: Negative mg/dL   Blood: x / Protein: 100 mg/dL / Nitrite: Negative   Leuk Esterase: Moderate / RBC: 6-10 /HPF / WBC 26-50   Sq Epi: x / Non Sq Epi: Occasional / Bacteria: Occasional      Magnesium, Serum: 2.2 mg/dL ( @ 07:03)  Magnesium, Serum: 2.1 mg/dL ( @ 20:37)    CAPILLARY BLOOD GLUCOSE      POCT Blood Glucose.: 157 mg/dL (24 Dec 2022 05:38)  POCT Blood Glucose.: 235 mg/dL (23 Dec 2022 23:53)  POCT Blood Glucose.: 164 mg/dL (23 Dec 2022 16:25)      CAPILLARY BLOOD GLUCOSE      POCT Blood Glucose.: 157 mg/dL (24 Dec 2022 05:38)  POCT Blood Glucose.: 235 mg/dL (23 Dec 2022 23:53)  POCT Blood Glucose.: 164 mg/dL (23 Dec 2022 16:25)    CAPILLARY BLOOD GLUCOSE      POCT Blood Glucose.: 157 mg/dL (24 Dec 2022 05:38)    ABG - ( 24 Dec 2022 06:41 )  pH, Arterial: 7.46  pH, Blood: x     /  pCO2: 41    /  pO2: 190   / HCO3: 29    / Base Excess: 5.4   /  SaO2: 99.3                    RECENT CULTURES:      RADIOLOGY & ADDITIONAL TESTS:                        DVT/GI ppx  Discussed with pt @ bedside

## 2022-12-24 NOTE — PROGRESS NOTE ADULT - ASSESSMENT
Pt is a 62M CAD, Dilated cardiomyopathy, S/p AICD, Afib/flutter, h/o substance abuse, CKD baseline creat approx 1.4 last admitted to [facility] with MSSA bacteremia, and CHF.    Sent from [facility] SNF with acute hypoxic respiratory failure. Was initially on BiPAP then became hypoxic.  Anesthesia intubated patient.  On post intubation Xray pneumothorax evident.  Patient with PEA arrest.  Chest tube placed by ED physician.  ROSC obtained.       S/p PEA arrest  AHRF requiring intubation  ?Aspiration PNA   Loculated pleural effusions  - imaging reviewed as above  - CT reviewed  - f/u pending cx  - c/w zosyn  - adding vanc x1 dose pending additional cx  - trend temps/WBC  - maintain aspiration precautions  - ICU care    GOC per primary team    Covering weekend/holiday service through 12/26  Covering Dr. Phan  Infectious Diseases will continue to follow. Please call with any questions.   Shila Frazier M.D.  [facility] Division of Infectious Diseases [phone]

## 2022-12-24 NOTE — PROGRESS NOTE ADULT - ASSESSMENT
62 year old male with dilated cardiomyopathy, S/p AICD, Afib/flutter who  presented with acute hypoxic and hypercarbic respiratory failure associated with pneumothorax now status post cardiac arrest on mechanical ventilation     acute hypoxic respiratory failure   -Patient currently on Full vent support  -titrate settings to maintain SaO2 >90%, or pH >7.25  -consider low titdal volume ventilation strategy w/ goal Tv 4-6 cc/kg of ideal body weight  -plateu pressure goal <30  -Peridex oral care and VAP prophylaxis with HOB 30 degrees   -aggressive chest PT and suctioning   -daily sedation vacation with spontaneous breathing trial if clinical condition warrants, discuss with respiratory therapy     Cardiac arrest   -Repeat EKG  -Order echocardiogram  -F/U and trend troponin levels  -modified hypothermia protocol not used     Pneumothorax   -left lung reexpanded   -continue chest tube to wall suction    aspiration PNA   -Aspiration PNA  with loculated pleural effusions  - f/u pending cultures   - continue antibiotics with  zosyn  - trend temps/WBC    DERRICK   -Hold nephrotoxic meds  -Continue aggressive hydration  -Trend urine output  -Follow up BUN/Creatinine/ electrolytes     chronic systolic heart failure s/p ICD  -Echo in [facility] ([place] showed severely reduced LV (EF 10%) and RV function, mod MR, mod TR, mild pulm HTN  -repeat echocardiogram   -EKG consistent with NSR to sinus tachycardia with known LBBB  -consider diuresis given high Probnp and clinical rales     Transaminitis   -likley secondary to poor perfusion during cardiac arrest  -will trend LFts and folllow     Prophylaxtic measures   -continue lovenox or  heparin SQ for DVT with SD'S  -continue protonix for GI  -contiue aspiration precautions by keeping head of bed at 30 degrees     Neurologic status   - of sedation not awake and no response to pain, pupils are 4mm and sluggish  - positive gag and overbreathing the vent at this time   -hold sedation to asses neurological progression         62 year old male with dilated cardiomyopathy, S/p AICD, Afib/flutter who  presented with acute hypoxic and hypercarbic respiratory failure associated with pneumothorax now status post cardiac arrest on mechanical ventilation     acute hypoxic respiratory failure   -Patient currently on Full vent support  -titrate settings to maintain SaO2 >90%, or pH >7.25  -consider low titdal volume ventilation strategy w/ goal Tv 4-6 cc/kg of ideal body weight  -plateu pressure goal <30  -Peridex oral care and VAP prophylaxis with HOB 30 degrees   -aggressive chest PT and suctioning   -daily sedation vacation with spontaneous breathing trial if clinical condition warrants, discuss with respiratory therapy     Cardiac arrest   -Repeat EKG  -Order echocardiogram  -F/U and trend troponin levels  -modified hypothermia protocol not used     Pneumothorax   -left lung reexpanded   -continue chest tube to wall suction    aspiration PNA   -Aspiration PNA  with loculated pleural effusions  - f/u pending cultures   - continue antibiotics with  zosyn  - trend temps/WBC    DERRICK   -Hold nephrotoxic meds  -Continue aggressive hydration  -Trend urine output  -Follow up BUN/Creatinine/ electrolytes     chronic systolic heart failure s/p ICD  -Echo in [facility] showed severely reduced LV (EF 10%) and RV function, mod MR, mod TR, mild pulm HTN  -repeat echocardiogram   -EKG consistent with NSR to sinus tachycardia with known LBBB  -consider diuresis given high Probnp and clinical rales     Transaminitis   -likley secondary to poor perfusion during cardiac arrest  -will trend LFts and folllow     DM  -Regular Insulin Slide Scale  -Finger sticks Q 6 hours  -Hemoglobin A1c     Prophylaxtic measures   -continue lovenox or  heparin SQ for DVT with SD'S  -continue protonix for GI  -contiue aspiration precautions by keeping head of bed at 30 degrees     Neurologic status   - of sedation not awake and no response to pain, pupils are 4mm and sluggish  - positive gag and overbreathing the vent at this time   -hold sedation to asses neurological progression

## 2022-12-24 NOTE — DIETITIAN INITIAL EVALUATION ADULT - OTHER INFO
Weight: Weight hx is unknown at this time, upon admission pt with a wt of 179.8 lbs (12/23) and daily 174.8 lbs (12/24). Will continue to monitor weights and trend as available.     Pt seen for nutrition assessment secondary to referral, pt admitted with acute respiratory failure currently intubated. Pt currently with no active diet ordered, defer initiation of diet to medical team. Per documents pt on modified texture diet in prior admissions, as medically feasible recommend SLP evaluation to determine appropriate texture/ consistency of foods. RD to follow up to continue to monitor pt's nutrition status an provide further recommendations based on pt's hospital course.   Weight: Weight hx is unknown at this time, upon admission pt with a wt of 179.8 lbs (12/23) and daily wt of 174.8 lbs (12/24). Will continue to monitor weights and trend as available.     Pt seen for nutrition assessment secondary to referral, pt admitted with acute respiratory failure currently intubated. Pt currently with no active diet ordered, defer initiation of diet to medical team. Per documents pt on modified texture diet on prior admissions, as medically feasible recommend SLP evaluation to determine appropriate texture/ consistency of foods. RD to follow up to continue to monitor pt's nutrition status an provide further recommendations based on pt's hospital course.

## 2022-12-24 NOTE — PROGRESS NOTE ADULT - SUBJECTIVE AND OBJECTIVE BOX
Date/Time Patient Seen:  		  Referring MD:   Data Reviewed	       Patient is a 62y old  Male who presents with a chief complaint of resp failure (24 Dec 2022 00:38)      Subjective/HPI     PAST MEDICAL & SURGICAL HISTORY:  Heart failure, systolic    CAD (coronary artery disease)    Hypertension    Nonischemic cardiomyopathy    COPD, moderate    2019 novel coronavirus disease (COVID-19)    Substance abuse    HLD (hyperlipidemia)    Cardiac LV ejection fraction 10-20%    No significant past surgical history    AICD (automatic cardioverter/defibrillator) present    Cardiac LV ejection fraction 10-20%          Medication list         MEDICATIONS  (STANDING):  albuterol/ipratropium (CFC free) Inhaler. 1 Puff(s) Inhalation every 6 hours  albuterol/ipratropium for Nebulization. 3 milliLiter(s) Nebulizer once  aspirin  chewable 81 milliGRAM(s) Oral daily  chlorhexidine 0.12% Liquid 15 milliLiter(s) Oral Mucosa every 12 hours  dextrose 5%. 1000 milliLiter(s) (100 mL/Hr) IV Continuous <Continuous>  dextrose 5%. 1000 milliLiter(s) (50 mL/Hr) IV Continuous <Continuous>  dextrose 50% Injectable 25 Gram(s) IV Push once  dextrose 50% Injectable 12.5 Gram(s) IV Push once  dextrose 50% Injectable 25 Gram(s) IV Push once  glucagon  Injectable 1 milliGRAM(s) IntraMuscular once  heparin   Injectable 5000 Unit(s) SubCutaneous every 12 hours  insulin lispro (ADMELOG) corrective regimen sliding scale   SubCutaneous every 6 hours  methylPREDNISolone sodium succinate Injectable 40 milliGRAM(s) IV Push every 8 hours  pantoprazole  Injectable 40 milliGRAM(s) IV Push daily  piperacillin/tazobactam IVPB.. 3.375 Gram(s) IV Intermittent every 8 hours    MEDICATIONS  (PRN):  dextrose Oral Gel 15 Gram(s) Oral once PRN Blood Glucose LESS THAN 70 milliGRAM(s)/deciliter  fentaNYL    Injectable 100 MICROGram(s) IV Push every 4 hours PRN MV synchr         Vitals log        ICU Vital Signs Last 24 Hrs  T(C): 36.7 (24 Dec 2022 04:00), Max: 37.8 (23 Dec 2022 10:02)  T(F): 98.1 (24 Dec 2022 04:00), Max: 100 (23 Dec 2022 10:02)  HR: 92 (24 Dec 2022 06:00) (91 - 118)  BP: 108/74 (24 Dec 2022 06:00) (81/68 - 694/-)  BP(mean): 85 (24 Dec 2022 06:00) (72 - 102)  ABP: --  ABP(mean): --  RR: 20 (24 Dec 2022 06:00) (17 - 445)  SpO2: 98% (24 Dec 2022 06:00) (60% - 100%)    O2 Parameters below as of 24 Dec 2022 06:00  Patient On (Oxygen Delivery Method): ventilator    O2 Concentration (%): 60         Mode: AC/ CMV (Assist Control/ Continuous Mandatory Ventilation)  RR (machine): 20  TV (machine): 400  FiO2: 60  PEEP: 5  ITime: 1  MAP: 9  PIP: 18      Input and Output:  I&O's Detail    23 Dec 2022 07:01  -  24 Dec 2022 06:45  --------------------------------------------------------  IN:    IV PiggyBack: 450 mL  Total IN: 450 mL    OUT:    Chest Tube (mL): 0 mL    Indwelling Catheter - Urethral (mL): 305 mL  Total OUT: 305 mL    Total NET: 145 mL          Lab Data                        16.3   19.23 )-----------( 195      ( 23 Dec 2022 20:37 )             51.7     12-23    146<H>  |  103  |  67<H>  ----------------------------<  238<H>  4.3   |  24  |  2.65<H>    Ca    8.7      23 Dec 2022 20:37  Mg     2.1     12-23    TPro  7.4  /  Alb  3.9  /  TBili  2.8<H>  /  DBili  x   /  AST  2349<H>  /  ALT  1673<H>  /  AlkPhos  458<H>  12-23    ABG - ( 24 Dec 2022 06:41 )  pH, Arterial: 7.46  pH, Blood: x     /  pCO2: 41    /  pO2: 190   / HCO3: 29    / Base Excess: 5.4   /  SaO2: 99.3                    Review of Systems	      Objective     Physical Examination    heart s1s2  lung dec bS  head nc      Pertinent Lab findings & Imaging      Leo:  NO   Adequate UO     I&O's Detail    23 Dec 2022 07:01  -  24 Dec 2022 06:45  --------------------------------------------------------  IN:    IV PiggyBack: 450 mL  Total IN: 450 mL    OUT:    Chest Tube (mL): 0 mL    Indwelling Catheter - Urethral (mL): 305 mL  Total OUT: 305 mL    Total NET: 145 mL               Discussed with:     Cultures:	        Radiology

## 2022-12-24 NOTE — PROGRESS NOTE ADULT - SUBJECTIVE AND OBJECTIVE BOX
Critical Care DONNA THOMPSON    CC; Patient is a 62y old  Male who presents with a chief complaint of resp failure       SUBJECTIVE / OVERNIGHT EVENTS:  Pt is a 62 year old male PMHx of CAD, Dilated cardiomyopathy, S/p AICD, Afib/flutter, h/o substance abuse, CKD baseline creat approx 1.4 last admitted to [facility] with MSSA bacteremia, and CHF.  He was Sent from [facility] with acute hypoxic respiratory failure. Was initially on BiPAP then became hypoxic.  Anesthesia intubated patient.  On post intubation Xray showed pneumothorax with worsening respiratory status and eventual P{EA arrest.  ACLS established Chest tube placed by ED physician then ROSC obtained.    Tonight he remains on mechanical ventilation not in shock state with maps in the 64 to 68 range, intial ABG showed ph of 7.05 with PCO2 of 95 with repeat improvement to Ph 7.33 Pco2 42. Serum lactate is trending downward from 5.5 to 3.2, He currently has likely Aspiration PNA  with loculated pleural effusions and is on antibiotics, aggressive IV fluid hydration is held due to volume overload with PRoBnp of 31,000 and physical exam tonight with rales in the lung bases bilaterally via ascultation. Left sided chest tube remains in place for tension pneumothorax during cardiac arrest resuscitated efforts, post chest xray shows the left lung expanded. Leukocytosis mixed component of reactive leukocytosis and aspiration PNA with cultures pending.         MEDICATIONS  (STANDING):  albuterol/ipratropium (CFC free) Inhaler. 1 Puff(s) Inhalation every 6 hours  albuterol/ipratropium for Nebulization. 3 milliLiter(s) Nebulizer once  aspirin  chewable 81 milliGRAM(s) Oral daily  chlorhexidine 0.12% Liquid 15 milliLiter(s) Oral Mucosa every 12 hours  dextrose 5%. 1000 milliLiter(s) (100 mL/Hr) IV Continuous <Continuous>  dextrose 5%. 1000 milliLiter(s) (50 mL/Hr) IV Continuous <Continuous>  dextrose 50% Injectable 25 Gram(s) IV Push once  dextrose 50% Injectable 12.5 Gram(s) IV Push once  dextrose 50% Injectable 25 Gram(s) IV Push once  glucagon  Injectable 1 milliGRAM(s) IntraMuscular once  heparin   Injectable 5000 Unit(s) SubCutaneous every 12 hours  insulin lispro (ADMELOG) corrective regimen sliding scale   SubCutaneous every 6 hours  methylPREDNISolone sodium succinate Injectable 40 milliGRAM(s) IV Push every 8 hours  pantoprazole  Injectable 40 milliGRAM(s) IV Push daily  piperacillin/tazobactam IVPB.. 3.375 Gram(s) IV Intermittent every 8 hours    MEDICATIONS  (PRN):  dextrose Oral Gel 15 Gram(s) Oral once PRN Blood Glucose LESS THAN 70 milliGRAM(s)/deciliter  fentaNYL    Injectable 100 MICROGram(s) IV Push every 4 hours PRN MV synchr    CAPILLARY BLOOD GLUCOSE  POCT Blood Glucose.: 235 mg/dL (23 Dec 2022 23:53)  POCT Blood Glucose.: 164 mg/dL (23 Dec 2022 16:25)    I&O's Summary  23 Dec 2022 07:01  -  24 Dec 2022 00:38  --------------------------------------------------------  IN: 350 mL / OUT: 5 mL / NET: 345 mL    PHYSICAL EXAM:  Vital Signs Last 24 Hrs  T(C): 36.8 (24 Dec 2022 00:00), Max: 37.8 (23 Dec 2022 10:02)  T(F): 98.2 (24 Dec 2022 00:00), Max: 100 (23 Dec 2022 10:02)  HR: 102 (24 Dec 2022 00:00) (91 - 118)  BP: 117/83 (24 Dec 2022 00:00) (81/68 - 694/-)  BP(mean): 93 (24 Dec 2022 00:00) (72 - 102)  RR: 17 (24 Dec 2022 00:00) (17 - 445)  SpO2: 99% (24 Dec 2022 00:00) (60% - 100%)    Parameters below as of 24 Dec 2022 00:00  Patient On (Oxygen Delivery Method): ventilator  O2 Concentration (%): 60    CONSTITUTIONAL: NAD, well-developed, well-groomed  EYES: PERRLA; conjunctiva and sclera clear  ENMT: Moist oral mucosa, no pharyngeal injection or exudates; normal dentition  NECK: Supple, no palpable masses; no thyromegaly  RESPIRATORY: Normal respiratory effort; lungs are clear to auscultation bilaterally  CARDIOVASCULAR: Regular rate and rhythm, normal S1 and S2, no murmur/rub/gallop; No lower extremity edema; Peripheral pulses are 2+ bilaterally  ABDOMEN: Nontender to palpation, normoactive bowel sounds, no rebound/guarding; No hepatosplenomegaly  MUSCLOSKELETAL:  Normal gait; no clubbing or cyanosis of digits; no joint swelling or tenderness to palpation  PSYCH: A+O to person, place, and time; affect appropriate  NEUROLOGY: CN 2-12 are intact and symmetric; no gross sensory deficits;   SKIN: No rashes; no palpable lesions    LABS:                       16.3   19.23 )-----------( 195      ( 23 Dec 2022 20:37 )             51.7   12  146<H>  |  103  |  67<H>  ----------------------------<  238<H>  4.3   |  24  |  2.65<H>  Ca    8.7      23 Dec 2022 20:37  Mg     2.1       Pro  7.4  /  Alb  3.9  /  TBili  2.8<H>  /  DBili  x   /  AST  2349<H>  /  ALT  1673<H>  /  AlkPhos  458<H>  12-23  PT/INR - ( 23 Dec 2022 10:16 )   PT: 16.9 sec;   INR: 1.46 ratio    PTT - ( 23 Dec 2022 10:16 )  PTT:34.9 sec    Urinalysis Basic - ( 23 Dec 2022 11:14 )  Color: Yellow / Appearance: Turbid / S.010 / pH: x  Gluc: x / Ketone: Negative  / Bili: Negative / Urobili: Negative mg/dL   Blood: x / Protein: 100 mg/dL / Nitrite: Negative   Leuk Esterase: Moderate / RBC: 6-10 /HPF / WBC 26-50   Sq Epi: x / Non Sq Epi: Occasional / Bacteria: Occasional    RADIOLOGY & ADDITIONAL TESTS:  Results Reviewed:   Imaging Personally Reviewed:  Electrocardiogram Personally Reviewed:    COORDINATION OF CARE:  Care Discussed with Consultants/Other Providers

## 2022-12-24 NOTE — DIETITIAN INITIAL EVALUATION ADULT - ORAL INTAKE PTA/DIET HISTORY
Pt seen at bedside in SPCU, unable to obtain subjective information at this time; pt currently intubated. Information obtained from EMR and transfer documents, presents from [facility], energy intake prior to admission is unknown at this time was following a NCS diet with regular texture & thin liquids; receiving vanilla Glucerna 2x/day. Per documents pt with allergy to pork and taking Vitamin C, Multivitamin supplementation priors to admission.  Pt seen at bedside in SPCU, unable to obtain subjective information at this time; pt currently intubated. Information obtained from EMR and transfer documents, presents from [facility], energy intake prior to admission is unknown at this time was following a NCS diet with regular texture & thin liquids; receiving vanilla Glucerna 2x/day. Per documents pt with allergy to pork and taking Vitamin C, Multivitamin supplementation priors to admission.

## 2022-12-24 NOTE — PROGRESS NOTE ADULT - ASSESSMENT
REVIEW OF SYMPTOMS      Able to give (reliable) ROS  NO     PHYSICAL EXAM    HEENT Unremarkable  atraumatic   RESP Fair air entry EXP prolonged    Harsh breath sound Resp distres mild   CARDIAC S1 S2 No S3     NO JVD    ABDOMEN SOFT BS PRESENT NOT DISTENDED No hepatosplenomegaly   PEDAL EDEMA present No calf tenderness  NO rash       GENERAL DATA .   GOC.   12/23/2022 full code  ALLGY.     nka                  WT.     12/24/2022 81           BMI.       12/24/2022 26          ICU STAY. .. 12/23/2022  COVID. ..  12/23/2022 scv2 (-)   BEST PRACTICE ISSUES.    HOB ELEVATN. Yes  DVT PPLX. ..    12/23/2022 hpsc   WHITMORE PPLX. ..    12/23/2022 protonix 40   INFN PPLX. ..  12/23/2022 chlorhexidine .12%   SP SW VIVIAN.         DIET.  ..  12/24/2022 pulmocare 30 ng   IV fl...      PROCEDURES...   12/23/2022  l chest tube   12/23/2022 intubated   12/23/2022 reed       ABGS.  12/24/2022 ac 60% 746/41/190   12/23/2022 11 a 100% 705/95/68     VS/ PO/IO/ VENT/ DRIPS.   12/24/2022 afeb 92 90/70   12/24/2022 ac 20/400/5/.6     PREV ADMISSION 2/11-2/25/2022 NW P    MAIN ISSUES.  62 m doa 12/23/2022 resp distress  PMH COPD  [facility] COVID (+) 2/11/2022   PM S aureus bacteremia mssa 2/11   .. Ancef 2/12/2022 Dr Phan  [facility] AICD  PM HFREF  .. ECHO 11/20/2021 ef 20%  PMH A fib (on eliquis)     CAC 12/23/2022 rosc 25 min   Intubation 12/23/2022  Vent mgmt 12/23/2022   sedation.  .. 12/23/2022 fentanyl 100.4p   Pneumothorax.  .. 12/23/2022 cxr tension pntx  .. 12/23/2022 chets tube inserted   .. cxr pntx reslvd   Infection.  Possible aspn pneum 12/23/2022  Possible uti 12/23/2022   .. ua 12/23/2022 w 26-50   .. mrsa 12/23(+)   .. 12/23/2022 zosyn   Lacticemia.  .. la 12/23-12/24/2022 la 3.2  - 1.9   elevated lfts.  .. LFTS 12/23-12/24/2022       - 390     - 2524       - 2066   DERRICK.  .. Cr 12/23-12/24/2022 Cr 1.7- 3.1    Hypernatremia.  .. Na 12/24/2022 Na 147     HOME MEDS INCLUDE.  apixaba 5.2   sacubitril valsartan 24 26    bumetanide 2   spironolactone 25  protonix 40   amiodarone 200   .     PROBLEMS ASSESSMENT RECOMMENDATIONS.  Intubation 12/23/2022  Vent mgmnt.   .. bundle dsv dsbt ltvv pplat 30 (-) PO2 60 (+) ph 7.3 (+)  sedation.  .. 12/23/2022 fentanyl 100.4p   COPD.  .. 12/23/2022 combivent .4     .. 12/23/2022 solumed 40.3 -> 12/24/2022 solumed 40   .. 12/23/2022 will taper to 40 on 12/24   Code 12/23/2022    .. 12/23/2022 was  restless when he came in No defib  .. 12/23/2022 coded at 12.13 p pea arrest about 25 min rosc   .. 12/23/2022 monitor neuro recovery  .. 12/24/2022 eyes open gag (+) does not follow commands   Pneumothorax.  .. 12/23/2022 cxr tension pntx  .. 12/23/2022 chets tube inserted  .. cxr 12/24/2022 et tube ng tube cardiac device cm sm r effs and basal infiltr l chest tube npo pntx sc emphysema improvd   .. 12/24/2022 bleeding at chest tube insertion site and scant drainage   .. 12/24/2022 above chest tube issues dw Dr Borges and she felkt bleeding likely sec recent apixaba and no drainage likely sec to resolution of pntx  and advised observation   .. follow clinically and with imaging   .. 12/24/2022 plan is to wean off vent and then plan chest tube remoival   Infection.   Possible aspn pneum 12/23/2022  Possible uti 12/23/2022   .. W 12/23- 12/24/2022 w 9.6 - 21   .. pr 12/24/2022 pr 31    .. ua 12/23/2022 w 26-50   .. flu ab 12/23/2022 (-)  .. rsv 12/23/2022 (-)   .. mrsa 12/23 (+)   .. 12/23 zosyn   .. 12/23/2022 check ct forman  .. bsab follow cult   .. 12/24/2022 dw Dr Frazier To add vanco   Lacticemia.  .. la 12/23-12/24/2022 la 3.2  - 1.9   .. Fluids and monitor  CAD.  .. Tr 12/24/2022 Tr 259   .. 12/23/2022 asa 81   .. monitor   CHF.  .. ho chf   .. chf meds to be reintroduced by cardio as allowed by bp   elevated lfts.  .. LFTS 12/23-12/24/2022       - 390     - 2524       - 2066         .. 12/23/2022 check ct cap  .. 12/23/2022 check hep profile   .. 12/23/2022 follow serially  .. elevcated lfts likely sec to anoxic hepatopathy during cac   DERRICK.  .. Cr 12/23-12/24/2022 Cr 1.7- 3.1    .. fluids and serial monitoring  Hypernatremia.  .. Na 12/24/2022 Na 147   AMS.  .. 12/23/2022 check ct head    TIME SPENT   Over 39 minutes aggregate critical care time spent on encounter; activities included   direct patient care, counseling and/or coordinating care reviewing notes, lab data/ imaging , discussion with multidisciplinary team/ patient  /family and explaining in detail risks, benefits, alternatives  of the recommendations     BIPIN DE LA CRUZ 61 m 12/23/2022 1962 DR KELVIN VANESSA

## 2022-12-24 NOTE — DIETITIAN INITIAL EVALUATION ADULT - PERTINENT MEDS FT
MEDICATIONS  (STANDING):  albuterol/ipratropium (CFC free) Inhaler. 1 Puff(s) Inhalation every 6 hours  albuterol/ipratropium for Nebulization. 3 milliLiter(s) Nebulizer once  aspirin  chewable 81 milliGRAM(s) Oral daily  chlorhexidine 0.12% Liquid 15 milliLiter(s) Oral Mucosa every 12 hours  dextrose 5%. 1000 milliLiter(s) (100 mL/Hr) IV Continuous <Continuous>  dextrose 5%. 1000 milliLiter(s) (50 mL/Hr) IV Continuous <Continuous>  dextrose 50% Injectable 25 Gram(s) IV Push once  dextrose 50% Injectable 12.5 Gram(s) IV Push once  dextrose 50% Injectable 25 Gram(s) IV Push once  glucagon  Injectable 1 milliGRAM(s) IntraMuscular once  heparin   Injectable 5000 Unit(s) SubCutaneous every 12 hours  insulin lispro (ADMELOG) corrective regimen sliding scale   SubCutaneous every 6 hours  methylPREDNISolone sodium succinate Injectable 40 milliGRAM(s) IV Push every 8 hours  pantoprazole  Injectable 40 milliGRAM(s) IV Push daily  piperacillin/tazobactam IVPB.. 3.375 Gram(s) IV Intermittent every 8 hours    MEDICATIONS  (PRN):  dextrose Oral Gel 15 Gram(s) Oral once PRN Blood Glucose LESS THAN 70 milliGRAM(s)/deciliter  fentaNYL    Injectable 100 MICROGram(s) IV Push every 4 hours PRN MV synchr

## 2022-12-24 NOTE — PROGRESS NOTE ADULT - SUBJECTIVE AND OBJECTIVE BOX
DOROTHY VOSS    [facility]    Allergies    No Known Allergies    Intolerances    pork (Other)      PAST MEDICAL & SURGICAL HISTORY:  Heart failure, systolic      CAD (coronary artery disease)      Hypertension      Nonischemic cardiomyopathy      COPD, moderate      2019 novel coronavirus disease (COVID-19)      Substance abuse      HLD (hyperlipidemia)      Cardiac LV ejection fraction 10-20%      AICD (automatic cardioverter/defibrillator) present      Cardiac LV ejection fraction 10-20%          FAMILY HISTORY:  FH: lung cancer        Home Medications:  acetaminophen 325 mg oral tablet: 2 tab(s) orally every 6 hours, As needed, Temp greater or equal to 38C (100.4F), Mild Pain (1 - 3) (23 Dec 2022 10:07)  apixaban 5 mg oral tablet: 1 tab(s) orally every 12 hours (23 Dec 2022 10:07)  Aquaphor Healing topical ointment: Apply topically to affected area once a day (23 Dec 2022 10:07)  ascorbic acid 500 mg oral tablet: 1 tab(s) orally once a day (23 Dec 2022 10:07)  Bacid (LAC) oral capsule: 2 cap(s) orally once a day (23 Dec 2022 10:07)  bumetanide 2 mg oral tablet: 1 tab(s) orally 2 times a day (23 Dec 2022 10:07)  gabapentin 100 mg oral capsule: 1 cap(s) orally 3 times a day (23 Dec 2022 10:07)  melatonin 3 mg oral tablet: 1 tab(s) orally once a day (at bedtime), As needed, Insomnia (23 Dec 2022 10:07)  Multiple Vitamins with Minerals oral tablet: 1 tab(s) orally once a day (23 Dec 2022 10:07)  pantoprazole 40 mg oral delayed release tablet: 1 tab(s) orally once a day (before a meal) (23 Dec 2022 10:07)  sacubitril-valsartan 24 mg-26 mg oral tablet: 1 tab(s) orally 2 times a day (23 Dec 2022 10:07)  simethicone 80 mg oral tablet: 2 tab(s) orally every 6 hours, As Needed (23 Dec 2022 10:07)  spironolactone 25 mg oral tablet: 1 tab(s) orally once a day (23 Dec 2022 10:07)  Symbicort 160 mcg-4.5 mcg/inh inhalation aerosol: 2 puff(s) inhaled 2 times a day (23 Dec 2022 10:07)  Ventolin HFA 90 mcg/inh inhalation aerosol: 2 puff(s) inhaled every 6 hours, As Needed (23 Dec 2022 10:07)      MEDICATIONS  (STANDING):  albuterol/ipratropium (CFC free) Inhaler. 1 Puff(s) Inhalation every 6 hours  albuterol/ipratropium for Nebulization. 3 milliLiter(s) Nebulizer once  aspirin  chewable 81 milliGRAM(s) Oral daily  chlorhexidine 0.12% Liquid 15 milliLiter(s) Oral Mucosa every 12 hours  dextrose 5%. 1000 milliLiter(s) (100 mL/Hr) IV Continuous <Continuous>  dextrose 5%. 1000 milliLiter(s) (50 mL/Hr) IV Continuous <Continuous>  dextrose 50% Injectable 25 Gram(s) IV Push once  dextrose 50% Injectable 12.5 Gram(s) IV Push once  dextrose 50% Injectable 25 Gram(s) IV Push once  glucagon  Injectable 1 milliGRAM(s) IntraMuscular once  heparin   Injectable 5000 Unit(s) SubCutaneous every 12 hours  insulin lispro (ADMELOG) corrective regimen sliding scale   SubCutaneous every 6 hours  methylPREDNISolone sodium succinate Injectable 40 milliGRAM(s) IV Push every 8 hours  pantoprazole  Injectable 40 milliGRAM(s) IV Push daily  piperacillin/tazobactam IVPB.. 3.375 Gram(s) IV Intermittent every 8 hours    MEDICATIONS  (PRN):  dextrose Oral Gel 15 Gram(s) Oral once PRN Blood Glucose LESS THAN 70 milliGRAM(s)/deciliter  fentaNYL    Injectable 100 MICROGram(s) IV Push every 4 hours PRN MV synchr              Vital Signs Last 24 Hrs  T(C): 37.2 (24 Dec 2022 08:34), Max: 37.8 (23 Dec 2022 10:02)  T(F): 98.9 (24 Dec 2022 08:34), Max: 100 (23 Dec 2022 10:02)  HR: 93 (24 Dec 2022 08:33) (91 - 118)  BP: 105/76 (24 Dec 2022 08:00) (81/68 - 694/-)  BP(mean): 86 (24 Dec 2022 08:00) (72 - 102)  RR: 18 (24 Dec 2022 08:00) (17 - 445)  SpO2: 99% (24 Dec 2022 08:33) (60% - 100%)    Parameters below as of 24 Dec 2022 06:00  Patient On (Oxygen Delivery Method): ventilator    O2 Concentration (%): 60      22 @ 07:01  -  22 @ 07:00  --------------------------------------------------------  IN: 450 mL / OUT: 305 mL / NET: 145 mL        Mode: AC/ CMV (Assist Control/ Continuous Mandatory Ventilation), RR (machine): 20, TV (machine): 400, FiO2: 60, PEEP: 5, ITime: 1, MAP: 9, PIP: 18      LABS:                        15.4   21.77 )-----------( 176      ( 24 Dec 2022 07:03 )             48.0     12-24    147<H>  |  105  |  76<H>  ----------------------------<  182<H>  4.5   |  30  |  3.19<H>    Ca    9.1      24 Dec 2022 07:03  Phos  4.1     12-  Mg     2.2     12-24    TPro  7.5  /  Alb  3.7  /  TBili  2.5<H>  /  DBili  1.6<H>  /  AST  2524<H>  /  ALT  2066<H>  /  AlkPhos  390<H>  12-24    PT/INR - ( 24 Dec 2022 07:03 )   PT: 37.2 sec;   INR: 3.12 ratio         PTT - ( 23 Dec 2022 10:16 )  PTT:34.9 sec  Urinalysis Basic - ( 23 Dec 2022 11:14 )    Color: Yellow / Appearance: Turbid / S.010 / pH: x  Gluc: x / Ketone: Negative  / Bili: Negative / Urobili: Negative mg/dL   Blood: x / Protein: 100 mg/dL / Nitrite: Negative   Leuk Esterase: Moderate / RBC: 6-10 /HPF / WBC 26-50   Sq Epi: x / Non Sq Epi: Occasional / Bacteria: Occasional        ABG - ( 24 Dec 2022 06:41 )  pH, Arterial: 7.46  pH, Blood: x     /  pCO2: 41    /  pO2: 190   / HCO3: 29    / Base Excess: 5.4   /  SaO2: 99.3                WBC:  WBC Count: 21.77 K/uL ( @ 07:03)  WBC Count: 19.23 K/uL ( @ 20:37)  WBC Count: 9.66 K/uL ( @ 10:16)      MICROBIOLOGY:  RECENT CULTURES:              PT/INR - ( 24 Dec 2022 07:03 )   PT: 37.2 sec;   INR: 3.12 ratio         PTT - ( 23 Dec 2022 10:16 )  PTT:34.9 sec    Sodium:  Sodium, Serum: 147 mmol/L ( @ 07:03)  Sodium, Serum: 146 mmol/L ( @ 20:37)  Sodium, Serum: 142 mmol/L ( @ 10:16)      3.19 mg/dL  @ 07:03  2.65 mg/dL  @ 20:37  1.70 mg/dL  @ 10:16      Hemoglobin:  Hemoglobin: 15.4 g/dL ( @ 07:03)  Hemoglobin: 16.3 g/dL ( @ 20:37)  Hemoglobin: 16.6 g/dL ( @ 10:16)      Platelets: Platelet Count - Automated: 176 K/uL ( @ 07:03)  Platelet Count - Automated: 195 K/uL ( @ 20:37)  Platelet Count - Automated: 146 K/uL ( @ 10:16)      LIVER FUNCTIONS - ( 24 Dec 2022 07:03 )  Alb: 3.7 g/dL / Pro: 7.5 g/dL / ALK PHOS: 390 U/L / ALT: 2066 U/L DA / AST: 2524 U/L / GGT: x             Urinalysis Basic - ( 23 Dec 2022 11:14 )    Color: Yellow / Appearance: Turbid / S.010 / pH: x  Gluc: x / Ketone: Negative  / Bili: Negative / Urobili: Negative mg/dL   Blood: x / Protein: 100 mg/dL / Nitrite: Negative   Leuk Esterase: Moderate / RBC: 6-10 /HPF / WBC 26-50   Sq Epi: x / Non Sq Epi: Occasional / Bacteria: Occasional        RADIOLOGY & ADDITIONAL STUDIES:      MICROBIOLOGY:  RECENT CULTURES:

## 2022-12-24 NOTE — PROGRESS NOTE ADULT - ASSESSMENT
The patient is a 62 year old male with a history of CAD, chronic systolic heart failure s/p ICD, atrial flutter s/p DCCV, substance abuse, CKD who is admitted with respiratory failure in the setting of tension pneumothorax complicated by cardiac arrest.    12/24/22  Seen at [facility] ICU  Intubated, sleeping comfortably  WBC-21.77  BUN/Creat-76/3.19  Troponin-259.2  Bili-2.5  Alk phos-390  SGOT-2524  SGPT-2066    Plan:  - ECG with sinus tachycardia and known LBBB  - Echo 2/22 with severely reduced LV (EF 10%) and RV function, mod MR, mod TR, mild pulm HTN  - Hold all heart failure medications for now (BB, Entresto, spironolactone, bumetanide)  - BP ok for now but may trend down with sedation  - Hold apixaban given DERRICK and likely will need additional procedures  - Continue aspirin 81 mg daily  - Chest tube in place  - IV antibiotics-zosyn, vancomycin  - Sedation  - Mechanical ventilation  - ICU care  - Overall very poor prognosis      35 minutes of critical care time spent with the patient and coordinating care with nursing, hospitalist, and consultants. Patient is critically ill requiring ICU care. The patient is high risk for deterioration and death.

## 2022-12-24 NOTE — PROGRESS NOTE ADULT - NSPROGADDITIONALINFOA_GEN_ALL_CORE
Critical Care time: 38 mins assessing presenting problems of acute illness that poses high probability of life threatening deterioration or end organ damage/dysfunction.  Medical decision making inculding Initiating plan of care, reviewing data, reviewing radiology,direct patient bedside evaluation and interpretation of vital signs, any necessary ventilator management , discusion with multidisciplinary team, discussing goals of care with patient/family, all non inclusive of procedures, COVID 19 specific considerations and therapeutic  options based on the available and rapidly changing literature

## 2022-12-24 NOTE — DIETITIAN INITIAL EVALUATION ADULT - NSFNSGIIOFT_GEN_A_CORE
12-23-22 @ 07:01  -  12-24-22 @ 07:00  --------------------------------------------------------  OUT:    Chest Tube (mL): 0 mL  Total OUT: 0 mL    Total NET: 0 mL

## 2022-12-24 NOTE — PROGRESS NOTE ADULT - SUBJECTIVE AND OBJECTIVE BOX
Patient is a 62y Male with a known history of :    HPI:  62M CAD, Dilated cardiomyopathy, S/p AICD, Afib/flutter, h/o substance abuse, CKD baseline creat approx 1.4 last admitted to [facility] with MSSA bacteremia, and CHF.  Sent from [facility] with acute hypoxic respiratory failure.  Was initially on BiPAP then became hypoxic.  Anesthesia intubated patient.  On post intubation Xray pneumothorax evident.  Patient with PEA arrest.  Chest tube placed by ED physician.  ROSC obtained.     Patient unable to give further history.     (23 Dec 2022 13:02)      REVIEW OF SYSTEMS:    CONSTITUTIONAL: No fever, weight loss, or fatigue  EYES: No eye pain, visual disturbances, or discharge  ENMT:  No difficulty hearing, tinnitus, vertigo; No sinus or throat pain  NECK: No pain or stiffness  BREASTS: No pain, masses, or nipple discharge  RESPIRATORY: No cough, wheezing, chills or hemoptysis; No shortness of breath  CARDIOVASCULAR: No chest pain, palpitations, dizziness, or leg swelling  GASTROINTESTINAL: No abdominal or epigastric pain. No nausea, vomiting, or hematemesis; No diarrhea or constipation. No melena or hematochezia.  GENITOURINARY: No dysuria, frequency, hematuria, or incontinence  NEUROLOGICAL: No headaches, memory loss, loss of strength, numbness, or tremors  SKIN: No itching, burning, rashes, or lesions   LYMPH NODES: No enlarged glands  ENDOCRINE: No heat or cold intolerance; No hair loss  MUSCULOSKELETAL: No joint pain or swelling; No muscle, back, or extremity pain  PSYCHIATRIC: No depression, anxiety, mood swings, or difficulty sleeping  HEME/LYMPH: No easy bruising, or bleeding gums  ALLERGY AND IMMUNOLOGIC: No hives or eczema    MEDICATIONS  (STANDING):  albuterol/ipratropium (CFC free) Inhaler. 1 Puff(s) Inhalation every 6 hours  albuterol/ipratropium for Nebulization. 3 milliLiter(s) Nebulizer once  aspirin  chewable 81 milliGRAM(s) Oral daily  chlorhexidine 0.12% Liquid 15 milliLiter(s) Oral Mucosa every 12 hours  dextrose 5%. 1000 milliLiter(s) (100 mL/Hr) IV Continuous <Continuous>  dextrose 5%. 1000 milliLiter(s) (50 mL/Hr) IV Continuous <Continuous>  dextrose 50% Injectable 25 Gram(s) IV Push once  dextrose 50% Injectable 12.5 Gram(s) IV Push once  dextrose 50% Injectable 25 Gram(s) IV Push once  glucagon  Injectable 1 milliGRAM(s) IntraMuscular once  heparin   Injectable 5000 Unit(s) SubCutaneous every 12 hours  insulin lispro (ADMELOG) corrective regimen sliding scale   SubCutaneous every 6 hours  methylPREDNISolone sodium succinate Injectable 40 milliGRAM(s) IV Push daily  pantoprazole  Injectable 40 milliGRAM(s) IV Push daily  piperacillin/tazobactam IVPB.. 3.375 Gram(s) IV Intermittent every 8 hours  vancomycin  IVPB 1000 milliGRAM(s) IV Intermittent once    MEDICATIONS  (PRN):  dextrose Oral Gel 15 Gram(s) Oral once PRN Blood Glucose LESS THAN 70 milliGRAM(s)/deciliter  fentaNYL    Injectable 100 MICROGram(s) IV Push every 4 hours PRN MV synchr      ALLERGIES: No Known Allergies      FAMILY HISTORY:  FH: lung cancer        Social history:  Alochol:   Smoking:   Drug Use:   Marital Status:     PHYSICAL EXAMINATION:  -----------------------------  T(C): 36.5 (12-24-22 @ 12:56), Max: 37.2 (12-24-22 @ 08:34)  HR: 89 (12-24-22 @ 16:15) (89 - 118)  BP: 94/71 (12-24-22 @ 13:00) (87/66 - 128/94)  RR: 20 (12-24-22 @ 13:00) (17 - 22)  SpO2: 100% (12-24-22 @ 16:15) (60% - 100%)  Wt(kg): --    12-23 @ 07:01  -  12-24 @ 07:00  --------------------------------------------------------  IN:    IV PiggyBack: 450 mL  Total IN: 450 mL    OUT:    Chest Tube (mL): 0 mL    Indwelling Catheter - Urethral (mL): 305 mL  Total OUT: 305 mL    Total NET: 145 mL        Height (cm): 175.3 (12-23 @ 19:04)  Weight (kg): 81.6 (12-23 @ 19:04)  BMI (kg/m2): 26.6 (12-23 @ 19:04)  BSA (m2): 1.98 (12-23 @ 19:04)    Constitutional: well developed, normal appearance, well groomed, well nourished, no deformities and no acute distress.   Eyes: the conjunctiva exhibited no abnormalities and the eyelids demonstrated no xanthelasmas.   HEENT: normal oral mucosa, no oral pallor and no oral cyanosis.   Neck: normal jugular venous A waves present, normal jugular venous V waves present and no jugular venous malone A waves.   Pulmonary: no respiratory distress, normal respiratory rhythm and effort, no accessory muscle use and lungs were clear to auscultation bilaterally. Anteriorly  Cardiovascular: heart rate and rhythm were normal, normal S1 and S2 and no murmur, gallop, rub, heave or thrill are present.   Musculoskeletal: the gait could not be assessed.   Extremities: no clubbing of the fingernails, no localized cyanosis, no petechial hemorrhages and no ischemic changes.   Skin: normal skin color and pigmentation, no rash, no venous stasis, no skin lesions, no skin ulcer and no xanthoma was observed.        LABS:   --------  12-24    147<H>  |  105  |  76<H>  ----------------------------<  182<H>  4.5   |  30  |  3.19<H>    Ca    9.1      24 Dec 2022 07:03  Phos  4.1     12-24  Mg     2.2     12-24    TPro  7.5  /  Alb  3.7  /  TBili  2.5<H>  /  DBili  1.6<H>  /  AST  2524<H>  /  ALT  2066<H>  /  AlkPhos  390<H>  12-24                         15.4   21.77 )-----------( 176      ( 24 Dec 2022 07:03 )             48.0     PT/INR - ( 24 Dec 2022 07:03 )   PT: 37.2 sec;   INR: 3.12 ratio         PTT - ( 23 Dec 2022 10:16 )  PTT:34.9 sec              Radiology:

## 2022-12-24 NOTE — DIETITIAN INITIAL EVALUATION ADULT - PERTINENT LABORATORY DATA
12-24    147<H>  |  105  |  76<H>  ----------------------------<  182<H>  4.5   |  30  |  3.19<H>    Ca    9.1      24 Dec 2022 07:03  Phos  4.1     12-24  Mg     2.2     12-24    TPro  7.5  /  Alb  3.7  /  TBili  2.5<H>  /  DBili  1.6<H>  /  AST  2524<H>  /  ALT  2066<H>  /  AlkPhos  390<H>  12-24  POCT Blood Glucose.: 130 mg/dL (12-24-22 @ 11:30)  A1C with Estimated Average Glucose Result: 7.4 % (02-12-22 @ 11:01)

## 2022-12-25 NOTE — PROGRESS NOTE ADULT - ASSESSMENT
Pt is a 62M CAD, Dilated cardiomyopathy, S/p AICD, Afib/flutter, h/o substance abuse, CKD baseline creat approx 1.4 last admitted to [facility] with MSSA bacteremia, and CHF.    Sent from [facility] with acute hypoxic respiratory failure. Was initially on BiPAP then became hypoxic.  Anesthesia intubated patient.  On post intubation Xray pneumothorax evident.  Patient with PEA arrest.  Chest tube placed by ED physician.  ROSC obtained.       S/p PEA arrest  AHRF requiring intubation  ?Aspiration PNA   Loculated pleural effusions  - imaging reviewed as above  - CT reviewed  - BCx NGTD  - UCx -- GNR  - c/w zosyn  - trend temps/WBC--elevated but stable  - maintain aspiration precautions  - ICU care    GOC per primary team    Covering weekend/holiday service through 12/26  Covering Dr. Phan  Infectious Diseases will continue to follow. Please call with any questions.   Shila Frazier M.D.  [facility] Division of Infectious Diseases [phone]

## 2022-12-25 NOTE — PROGRESS NOTE ADULT - SUBJECTIVE AND OBJECTIVE BOX
Patient is a 62y old  Male who presents with a chief complaint of resp failure (24 Dec 2022 17:32)      BRIEF HOSPITAL COURSE: Pt is a 62 year old male PMHx of CAD, Dilated cardiomyopathy, S/p AICD, Afib/flutter, h/o substance abuse, CKD baseline creat approx 1.4 last admitted to [facility] with MSSA bacteremia, and CHF.  He was Sent from [facility]F with acute hypoxic respiratory failure. Was initially on BiPAP then became hypoxic.  Anesthesia intubated patient.  On post intubation Xray showed pneumothorax with worsening respiratory status and eventual P{EA arrest.  ACLS established Chest tube placed by ED physician then ROSC obtained. Tonight he remains on mechanical ventilation not in shock state with maps in the 64 to 68 range, intial ABG showed ph of 7.05 with PCO2 of 95 with repeat improvement to Ph 7.33 Pco2 42. Serum lactate is trending downward from 5.5 to 3.2, He currently has likely Aspiration PNA  with loculated pleural effusions and is on antibiotics, aggressive IV fluid hydration is held due to volume overload with PRoBnp of 31,000 and physical exam tonight with rales in the lung bases bilaterally via ascultation. Left sided chest tube remains in place for tension pneumothorax during cardiac arrest resuscitated efforts, post chest xray shows the left lung expanded. Leukocytosis mixed component of reactive leukocytosis and aspiration PNA with cultures pending.     Events last 24 hours: Patient remains on full vent support, no sedation gtt,     PAST MEDICAL & SURGICAL HISTORY:  Heart failure, systolic      CAD (coronary artery disease)      Hypertension      Nonischemic cardiomyopathy      COPD, moderate      2019 novel coronavirus disease (COVID-19)      Substance abuse      HLD (hyperlipidemia)      Cardiac LV ejection fraction 10-20%      AICD (automatic cardioverter/defibrillator) present      Cardiac LV ejection fraction 10-20%            Medications:  piperacillin/tazobactam IVPB.. 3.375 Gram(s) IV Intermittent every 8 hours      albuterol    90 MICROgram(s) HFA Inhaler 2 Puff(s) Inhalation every 6 hours  albuterol/ipratropium for Nebulization. 3 milliLiter(s) Nebulizer once  ipratropium 17 MICROgram(s) HFA Inhaler 1 Puff(s) Inhalation every 6 hours    fentaNYL    Injectable 100 MICROGram(s) IV Push every 4 hours PRN      aspirin  chewable 81 milliGRAM(s) Oral daily  heparin   Injectable 5000 Unit(s) SubCutaneous every 12 hours    pantoprazole  Injectable 40 milliGRAM(s) IV Push daily      dextrose 50% Injectable 25 Gram(s) IV Push once  dextrose 50% Injectable 12.5 Gram(s) IV Push once  dextrose 50% Injectable 25 Gram(s) IV Push once  dextrose Oral Gel 15 Gram(s) Oral once PRN  glucagon  Injectable 1 milliGRAM(s) IntraMuscular once  insulin lispro (ADMELOG) corrective regimen sliding scale   SubCutaneous every 6 hours  methylPREDNISolone sodium succinate Injectable 40 milliGRAM(s) IV Push daily    dextrose 5%. 1000 milliLiter(s) IV Continuous <Continuous>  dextrose 5%. 1000 milliLiter(s) IV Continuous <Continuous>      chlorhexidine 0.12% Liquid 15 milliLiter(s) Oral Mucosa every 12 hours        Mode: AC/ CMV (Assist Control/ Continuous Mandatory Ventilation)  RR (machine): 20  TV (machine): 400  FiO2: 40  PEEP: 5  ITime: 1  MAP: 9  PIP: 20      ICU Vital Signs Last 24 Hrs  T(C): 36.8 (24 Dec 2022 23:53), Max: 37.2 (24 Dec 2022 08:34)  T(F): 98.2 (24 Dec 2022 23:53), Max: 98.9 (24 Dec 2022 08:34)  HR: 81 (25 Dec 2022 00:37) (79 - 104)  BP: 81/62 (24 Dec 2022 22:00) (80/60 - 116/84)  BP(mean): 69 (24 Dec 2022 22:00) (68 - 94)  ABP: --  ABP(mean): --  RR: 20 (24 Dec 2022 22:00) (17 - 20)  SpO2: 98% (25 Dec 2022 00:37) (98% - 100%)    O2 Parameters below as of 25 Dec 2022 00:37  Patient On (Oxygen Delivery Method): ventilator,.40            ABG - ( 24 Dec 2022 06:41 )  pH, Arterial: 7.46  pH, Blood: x     /  pCO2: 41    /  pO2: 190   / HCO3: 29    / Base Excess: 5.4   /  SaO2: 99.3                I&O's Detail    23 Dec 2022 07:01  -  24 Dec 2022 07:00  --------------------------------------------------------  IN:    IV PiggyBack: 450 mL  Total IN: 450 mL    OUT:    Chest Tube (mL): 0 mL    Indwelling Catheter - Urethral (mL): 305 mL  Total OUT: 305 mL    Total NET: 145 mL      24 Dec 2022 07:01  -  25 Dec 2022 00:59  --------------------------------------------------------  IN:    IV PiggyBack: 100 mL  Total IN: 100 mL    OUT:    Chest Tube (mL): 0 mL    Indwelling Catheter - Urethral (mL): 300 mL  Total OUT: 300 mL    Total NET: -200 mL            LABS:                        15.4   21.77 )-----------( 176      ( 24 Dec 2022 07:03 )             48.0     12-24    147<H>  |  105  |  76<H>  ----------------------------<  182<H>  4.5   |  30  |  3.19<H>    Ca    9.1      24 Dec 2022 07:03  Phos  4.1     12-24  Mg     2.2     12-24    TPro  7.5  /  Alb  3.7  /  TBili  2.5<H>  /  DBili  1.6<H>  /  AST  2524<H>  /  ALT  2066<H>  /  AlkPhos  390<H>  12-24          CAPILLARY BLOOD GLUCOSE      POCT Blood Glucose.: 138 mg/dL (24 Dec 2022 23:42)    PT/INR - ( 24 Dec 2022 07:03 )   PT: 37.2 sec;   INR: 3.12 ratio         PTT - ( 23 Dec 2022 10:16 )  PTT:34.9 sec  Urinalysis Basic - ( 23 Dec 2022 11:14 )    Color: Yellow / Appearance: Turbid / S.010 / pH: x  Gluc: x / Ketone: Negative  / Bili: Negative / Urobili: Negative mg/dL   Blood: x / Protein: 100 mg/dL / Nitrite: Negative   Leuk Esterase: Moderate / RBC: 6-10 /HPF / WBC 26-50   Sq Epi: x / Non Sq Epi: Occasional / Bacteria: Occasional      CULTURES:  Culture Results:   No growth to date. ( @ 14:06)  Culture Results:   No growth to date. ( @ 10:17)      Physical Examination:    General: No acute distress.      HEENT: Pupils equal, reactive to light.  Symmetric.    PULM: Clear to auscultation bilaterally, no significant sputum production    NECK: Supple, no lymphadenopathy, trachea midline    CVS: Regular rate and rhythm, no murmurs, rubs, or gallops    ABD: Soft, nondistended, nontender, normoactive bowel sounds, no masses    EXT: No edema, nontender    SKIN: Warm and well perfused, no rashes noted.    NEURO: Alert, oriented, interactive, nonfocal    DEVICES:     RADIOLOGY: ***    CRITICAL CARE TIME SPENT: ***   Patient is a 62y old  Male who presents with a chief complaint of resp failure (24 Dec 2022 17:32)      BRIEF HOSPITAL COURSE: Pt is a 62 year old male PMHx of CAD, Dilated cardiomyopathy, S/p AICD, Afib/flutter, h/o substance abuse, CKD baseline creat approx 1.4 last admitted to  with MSSA bacteremia, and CHF.  He was Sent from [facility]F with acute hypoxic respiratory failure. Was initially on BiPAP then became hypoxic.  Anesthesia intubated patient.  On post intubation Xray showed pneumothorax with worsening respiratory status and eventual P{EA arrest.  ACLS established Chest tube placed by ED physician then ROSC obtained. Tonight he remains on mechanical ventilation not in shock state with maps in the 64 to 68 range, intial ABG showed ph of 7.05 with PCO2 of 95 with repeat improvement to Ph 7.33 Pco2 42. Serum lactate is trending downward from 5.5 to 3.2, He currently has likely Aspiration PNA  with loculated pleural effusions and is on antibiotics, aggressive IV fluid hydration is held due to volume overload with PRoBnp of 31,000 and physical exam tonight with rales in the lung bases bilaterally via ascultation. Left sided chest tube remains in place for tension pneumothorax during cardiac arrest resuscitated efforts, post chest xray shows the left lung expanded. Leukocytosis mixed component of reactive leukocytosis and aspiration PNA with cultures pending.     Events last 24 hours: Patient remains on full vent support, no sedation gtt.     Unable to perform ROS 2/2 patient's current state    PAST MEDICAL & SURGICAL HISTORY:  Heart failure, systolic      CAD (coronary artery disease)      Hypertension      Nonischemic cardiomyopathy      COPD, moderate      2019 novel coronavirus disease (COVID-19)      Substance abuse      HLD (hyperlipidemia)      Cardiac LV ejection fraction 10-20%      AICD (automatic cardioverter/defibrillator) present      Cardiac LV ejection fraction 10-20%            Medications:  piperacillin/tazobactam IVPB.. 3.375 Gram(s) IV Intermittent every 8 hours      albuterol    90 MICROgram(s) HFA Inhaler 2 Puff(s) Inhalation every 6 hours  albuterol/ipratropium for Nebulization. 3 milliLiter(s) Nebulizer once  ipratropium 17 MICROgram(s) HFA Inhaler 1 Puff(s) Inhalation every 6 hours    fentaNYL    Injectable 100 MICROGram(s) IV Push every 4 hours PRN      aspirin  chewable 81 milliGRAM(s) Oral daily  heparin   Injectable 5000 Unit(s) SubCutaneous every 12 hours    pantoprazole  Injectable 40 milliGRAM(s) IV Push daily      dextrose 50% Injectable 25 Gram(s) IV Push once  dextrose 50% Injectable 12.5 Gram(s) IV Push once  dextrose 50% Injectable 25 Gram(s) IV Push once  dextrose Oral Gel 15 Gram(s) Oral once PRN  glucagon  Injectable 1 milliGRAM(s) IntraMuscular once  insulin lispro (ADMELOG) corrective regimen sliding scale   SubCutaneous every 6 hours  methylPREDNISolone sodium succinate Injectable 40 milliGRAM(s) IV Push daily    dextrose 5%. 1000 milliLiter(s) IV Continuous <Continuous>  dextrose 5%. 1000 milliLiter(s) IV Continuous <Continuous>      chlorhexidine 0.12% Liquid 15 milliLiter(s) Oral Mucosa every 12 hours        Mode: AC/ CMV (Assist Control/ Continuous Mandatory Ventilation)  RR (machine): 20  TV (machine): 400  FiO2: 40  PEEP: 5  ITime: 1  MAP: 9  PIP: 20      ICU Vital Signs Last 24 Hrs  T(C): 36.8 (24 Dec 2022 23:53), Max: 37.2 (24 Dec 2022 08:34)  T(F): 98.2 (24 Dec 2022 23:53), Max: 98.9 (24 Dec 2022 08:34)  HR: 81 (25 Dec 2022 00:37) (79 - 104)  BP: 81/62 (24 Dec 2022 22:00) (80/60 - 116/84)  BP(mean): 69 (24 Dec 2022 22:00) (68 - 94)  ABP: --  ABP(mean): --  RR: 20 (24 Dec 2022 22:00) (17 - 20)  SpO2: 98% (25 Dec 2022 00:37) (98% - 100%)    O2 Parameters below as of 25 Dec 2022 00:37  Patient On (Oxygen Delivery Method): ventilator,.40            ABG - ( 24 Dec 2022 06:41 )  pH, Arterial: 7.46  pH, Blood: x     /  pCO2: 41    /  pO2: 190   / HCO3: 29    / Base Excess: 5.4   /  SaO2: 99.3                I&O's Detail    23 Dec 2022 07:01  -  24 Dec 2022 07:00  --------------------------------------------------------  IN:    IV PiggyBack: 450 mL  Total IN: 450 mL    OUT:    Chest Tube (mL): 0 mL    Indwelling Catheter - Urethral (mL): 305 mL  Total OUT: 305 mL    Total NET: 145 mL      24 Dec 2022 07:01  -  25 Dec 2022 00:59  --------------------------------------------------------  IN:    IV PiggyBack: 100 mL  Total IN: 100 mL    OUT:    Chest Tube (mL): 0 mL    Indwelling Catheter - Urethral (mL): 300 mL  Total OUT: 300 mL    Total NET: -200 mL            LABS:                        15.4   21.77 )-----------( 176      ( 24 Dec 2022 07:03 )             48.0     12-24    147<H>  |  105  |  76<H>  ----------------------------<  182<H>  4.5   |  30  |  3.19<H>    Ca    9.1      24 Dec 2022 07:03  Phos  4.1     12-24  Mg     2.2     12-24    TPro  7.5  /  Alb  3.7  /  TBili  2.5<H>  /  DBili  1.6<H>  /  AST  2524<H>  /  ALT  2066<H>  /  AlkPhos  390<H>  12-24          CAPILLARY BLOOD GLUCOSE      POCT Blood Glucose.: 138 mg/dL (24 Dec 2022 23:42)    PT/INR - ( 24 Dec 2022 07:03 )   PT: 37.2 sec;   INR: 3.12 ratio         PTT - ( 23 Dec 2022 10:16 )  PTT:34.9 sec  Urinalysis Basic - ( 23 Dec 2022 11:14 )    Color: Yellow / Appearance: Turbid / S.010 / pH: x  Gluc: x / Ketone: Negative  / Bili: Negative / Urobili: Negative mg/dL   Blood: x / Protein: 100 mg/dL / Nitrite: Negative   Leuk Esterase: Moderate / RBC: 6-10 /HPF / WBC 26-50   Sq Epi: x / Non Sq Epi: Occasional / Bacteria: Occasional      CULTURES:  Culture Results:   No growth to date. ( @ 14:06)  Culture Results:   No growth to date. ( @ 10:17)      Physical Examination:    General: Intubated, LT CT, reed     HEENT: Pupils equal, reactive to light.  Symmetric.    PULM: Clear to auscultation bilaterally, +LT chest tube     NECK: Supple, no lymphadenopathy, trachea midline    CVS: Regular rate and rhythm, no murmurs, rubs, or gallops    ABD: Soft, nondistended, nontender, normoactive bowel sounds, no masses    EXT: No edema, nontender    SKIN: Warm and well perfused, no rashes noted.    NEURO: Does not respond to verbal cues or withdraw from painful stimuli      DEVICES:     RADIOLOGY:   < from: Xray Chest 1 View- PORTABLE-Routine (Xray Chest 1 View- PORTABLE-Routine in AM.) (22 @ 08:08) >  ACC: [id number] EXAM:  XR CHEST PORTABLE ROUTINE 1V                          PROCEDURE DATE:  2022          INTERPRETATION:  HISTORY: Admitting Dxs: J96.01 RESP DISTRESS;  f/u tube   placement, pneumo;  TECHNIQUE: Portable frontal view of the chest, 1 view.  COMPARISON: Prior day at 12:44 PM.  FINDINGS/  IMPRESSION:      The endotracheal tube is above the jennie. The nasogastric tube courses   below the left hemidiaphragm, tip off edge of film. A cardiac device   overlies and obscures the left hemithorax with lead in place. Pacer pads   overlie the heart  HEART:  Enlarged  LUNGS: Small right effusion and basilar infiltrate. Left chest tube in   place. No pneumothorax. Subcutaneous emphysema has improved.  BONES: within normal limits      --- End of Report ---      EDGARD LOWERY MD; Attending Interventional Radiologist  This document has been electronically signed. Dec 24 2022  9:05AM    Critical Care time: 35 mins assessing presenting problems of acute illness that poses high probability of life threatening deterioration or end organ damage/dysfunction.  Medical decision making including initiating plan of care, reviewing data, reviewing radiology, direct patient bedside evaluation and interpretation of vital signs, any necessary ventilator management, discussion with multidisciplinary team, discussing goals of care with patient/family, all non inclusive of procedures

## 2022-12-25 NOTE — PROGRESS NOTE ADULT - SUBJECTIVE AND OBJECTIVE BOX
Date/Time Patient Seen:  		  Referring MD:   Data Reviewed	       Patient is a 62y old  Male who presents with a chief complaint of resp failure (25 Dec 2022 00:59)      Subjective/HPI     PAST MEDICAL & SURGICAL HISTORY:  Heart failure, systolic    CAD (coronary artery disease)    Hypertension    Nonischemic cardiomyopathy    COPD, moderate    2019 novel coronavirus disease (COVID-19)    Substance abuse    HLD (hyperlipidemia)    Cardiac LV ejection fraction 10-20%    No significant past surgical history    AICD (automatic cardioverter/defibrillator) present    Cardiac LV ejection fraction 10-20%          Medication list         MEDICATIONS  (STANDING):  albuterol    90 MICROgram(s) HFA Inhaler 2 Puff(s) Inhalation every 6 hours  albuterol/ipratropium for Nebulization. 3 milliLiter(s) Nebulizer once  aspirin  chewable 81 milliGRAM(s) Oral daily  chlorhexidine 0.12% Liquid 15 milliLiter(s) Oral Mucosa every 12 hours  dextrose 5%. 1000 milliLiter(s) (50 mL/Hr) IV Continuous <Continuous>  dextrose 5%. 1000 milliLiter(s) (100 mL/Hr) IV Continuous <Continuous>  dextrose 50% Injectable 25 Gram(s) IV Push once  dextrose 50% Injectable 12.5 Gram(s) IV Push once  dextrose 50% Injectable 25 Gram(s) IV Push once  glucagon  Injectable 1 milliGRAM(s) IntraMuscular once  heparin   Injectable 5000 Unit(s) SubCutaneous every 12 hours  insulin lispro (ADMELOG) corrective regimen sliding scale   SubCutaneous every 6 hours  ipratropium 17 MICROgram(s) HFA Inhaler 1 Puff(s) Inhalation every 6 hours  methylPREDNISolone sodium succinate Injectable 40 milliGRAM(s) IV Push daily  pantoprazole  Injectable 40 milliGRAM(s) IV Push daily  piperacillin/tazobactam IVPB.. 3.375 Gram(s) IV Intermittent every 8 hours    MEDICATIONS  (PRN):  dextrose Oral Gel 15 Gram(s) Oral once PRN Blood Glucose LESS THAN 70 milliGRAM(s)/deciliter  fentaNYL    Injectable 100 MICROGram(s) IV Push every 4 hours PRN MV synchr         Vitals log        ICU Vital Signs Last 24 Hrs  T(C): 36.8 (25 Dec 2022 04:43), Max: 37.2 (24 Dec 2022 08:34)  T(F): 98.2 (25 Dec 2022 04:43), Max: 98.9 (24 Dec 2022 08:34)  HR: 86 (25 Dec 2022 06:09) (79 - 97)  BP: 101/80 (25 Dec 2022 05:00) (80/60 - 113/84)  BP(mean): 88 (25 Dec 2022 05:00) (68 - 94)  ABP: --  ABP(mean): --  RR: 19 (25 Dec 2022 05:00) (17 - 20)  SpO2: 97% (25 Dec 2022 06:09) (97% - 100%)    O2 Parameters below as of 25 Dec 2022 03:00  Patient On (Oxygen Delivery Method): ventilator    O2 Concentration (%): 40         Mode: AC/ CMV (Assist Control/ Continuous Mandatory Ventilation)  RR (machine): 20  TV (machine): 400  FiO2: 40  PEEP: 5  ITime: 1  MAP: 9  PIP: 20      Input and Output:  I&O's Detail    24 Dec 2022 07:01  -  25 Dec 2022 07:00  --------------------------------------------------------  IN:    IV PiggyBack: 200 mL  Total IN: 200 mL    OUT:    Chest Tube (mL): 0 mL    Indwelling Catheter - Urethral (mL): 700 mL  Total OUT: 700 mL    Total NET: -500 mL          Lab Data                        15.2   20.28 )-----------( 176      ( 25 Dec 2022 06:27 )             47.2     12-24    147<H>  |  105  |  76<H>  ----------------------------<  182<H>  4.5   |  30  |  3.19<H>    Ca    9.1      24 Dec 2022 07:03  Phos  4.1     12-24  Mg     2.2     12-24    TPro  7.5  /  Alb  3.7  /  TBili  2.5<H>  /  DBili  1.6<H>  /  AST  2524<H>  /  ALT  2066<H>  /  AlkPhos  390<H>  12-24    ABG - ( 25 Dec 2022 06:19 )  pH, Arterial: 7.43  pH, Blood: x     /  pCO2: 44    /  pO2: 108   / HCO3: 29    / Base Excess: 4.9   /  SaO2: 98.8                    Review of Systems	      Objective     Physical Examination    heart s1s2  lung dec BS  head nc      Pertinent Lab findings & Imaging      Leo:  NO   Adequate UO     I&O's Detail    24 Dec 2022 07:01  -  25 Dec 2022 07:00  --------------------------------------------------------  IN:    IV PiggyBack: 200 mL  Total IN: 200 mL    OUT:    Chest Tube (mL): 0 mL    Indwelling Catheter - Urethral (mL): 700 mL  Total OUT: 700 mL    Total NET: -500 mL               Discussed with:     Cultures:	        Radiology

## 2022-12-25 NOTE — PROGRESS NOTE ADULT - ASSESSMENT
62M CAD, Dilated cardiomyopathy, S/p AICD, Afib/flutter, h/o substance abuse,  presented with acute hypoxic and hypercarbic respiratory failure associated with pneumothorax which lled to cardiac arrest       Acute respiratory failure  - continue vent management  - continue chest tube to suction.  Discussed case with CT surgery - no need for other acute intervention at this time. Unable to do onsite evaluation, but if requires intervention will transfer patient to [facility].   monitor serial CXR.  Get Chest CT when able.    - May have COPD exacerbation - steroids, duoneb ordered.  continue on zosyn  - Dr Goins to evaluate patient    Cardiomyopathy  - current cardiac issues appear to be secondary to pulm issues at this time  - hold medications for now   - hold eliquis pending possible further procedures and hospital course    DM2  - not on meds at [facility]  - FS monitoring with lispro coverage scale while critically ill    DERRICK on CKD  - Due to above, possible HF as well  - monitor creatinine, fluid status unclear at this time if patient requires further diuresis or IVF hydration    Prophylactic measure  - DVT proph: heparin SQ for now  - GI proph: protonix    D/w Dr Buster EMANUEL, Dr Zapien

## 2022-12-25 NOTE — PROGRESS NOTE ADULT - ASSESSMENT
62M CAD, Dilated cardiomyopathy, S/p AICD, Afib/flutter, h/o substance abuse,  presented with acute hypoxic and hypercarbic respiratory failure associated with pneumothorax.      ptx  resp failure  arnoldo hx  CM  AICD  AF    s/p ptx  s/p intubation  lung protective vent support  spoke with friend -  Meal - no HCP known - no family as per friend  plan for CT   vs noted  labs reviewed    oral and skin care  I and O  monitor VS and HD  CT in place -   emp ABX  COPD management  SW eval and follow up  assist with needs  monitor for needs - pain - sx -   [facility] care

## 2022-12-25 NOTE — PROGRESS NOTE ADULT - SUBJECTIVE AND OBJECTIVE BOX
DOROTHY VOSS    [facility]    Allergies    No Known Allergies    Intolerances    pork (Other)      PAST MEDICAL & SURGICAL HISTORY:  Heart failure, systolic      CAD (coronary artery disease)      Hypertension      Nonischemic cardiomyopathy      COPD, moderate      2019 novel coronavirus disease (COVID-19)      Substance abuse      HLD (hyperlipidemia)      Cardiac LV ejection fraction 10-20%      AICD (automatic cardioverter/defibrillator) present      Cardiac LV ejection fraction 10-20%          FAMILY HISTORY:  FH: lung cancer        Home Medications:  acetaminophen 325 mg oral tablet: 2 tab(s) orally every 6 hours, As needed, Temp greater or equal to 38C (100.4F), Mild Pain (1 - 3) (23 Dec 2022 10:07)  apixaban 5 mg oral tablet: 1 tab(s) orally every 12 hours (23 Dec 2022 10:07)  Aquaphor Healing topical ointment: Apply topically to affected area once a day (23 Dec 2022 10:07)  ascorbic acid 500 mg oral tablet: 1 tab(s) orally once a day (23 Dec 2022 10:07)  Bacid (LAC) oral capsule: 2 cap(s) orally once a day (23 Dec 2022 10:07)  bumetanide 2 mg oral tablet: 1 tab(s) orally 2 times a day (23 Dec 2022 10:07)  gabapentin 100 mg oral capsule: 1 cap(s) orally 3 times a day (23 Dec 2022 10:07)  melatonin 3 mg oral tablet: 1 tab(s) orally once a day (at bedtime), As needed, Insomnia (23 Dec 2022 10:07)  Multiple Vitamins with Minerals oral tablet: 1 tab(s) orally once a day (23 Dec 2022 10:07)  pantoprazole 40 mg oral delayed release tablet: 1 tab(s) orally once a day (before a meal) (23 Dec 2022 10:07)  sacubitril-valsartan 24 mg-26 mg oral tablet: 1 tab(s) orally 2 times a day (23 Dec 2022 10:07)  simethicone 80 mg oral tablet: 2 tab(s) orally every 6 hours, As Needed (23 Dec 2022 10:07)  spironolactone 25 mg oral tablet: 1 tab(s) orally once a day (23 Dec 2022 10:07)  Symbicort 160 mcg-4.5 mcg/inh inhalation aerosol: 2 puff(s) inhaled 2 times a day (23 Dec 2022 10:07)  Ventolin HFA 90 mcg/inh inhalation aerosol: 2 puff(s) inhaled every 6 hours, As Needed (23 Dec 2022 10:07)      MEDICATIONS  (STANDING):  albuterol    90 MICROgram(s) HFA Inhaler 2 Puff(s) Inhalation every 6 hours  albuterol/ipratropium for Nebulization. 3 milliLiter(s) Nebulizer once  aspirin  chewable 81 milliGRAM(s) Oral daily  chlorhexidine 0.12% Liquid 15 milliLiter(s) Oral Mucosa every 12 hours  dextrose 5%. 1000 milliLiter(s) (100 mL/Hr) IV Continuous <Continuous>  dextrose 5%. 1000 milliLiter(s) (50 mL/Hr) IV Continuous <Continuous>  dextrose 50% Injectable 25 Gram(s) IV Push once  dextrose 50% Injectable 12.5 Gram(s) IV Push once  dextrose 50% Injectable 25 Gram(s) IV Push once  glucagon  Injectable 1 milliGRAM(s) IntraMuscular once  heparin   Injectable 5000 Unit(s) SubCutaneous every 12 hours  insulin lispro (ADMELOG) corrective regimen sliding scale   SubCutaneous every 6 hours  ipratropium 17 MICROgram(s) HFA Inhaler 1 Puff(s) Inhalation every 6 hours  methylPREDNISolone sodium succinate Injectable 40 milliGRAM(s) IV Push daily  pantoprazole  Injectable 40 milliGRAM(s) IV Push daily  piperacillin/tazobactam IVPB.. 3.375 Gram(s) IV Intermittent every 8 hours    MEDICATIONS  (PRN):  dextrose Oral Gel 15 Gram(s) Oral once PRN Blood Glucose LESS THAN 70 milliGRAM(s)/deciliter  fentaNYL    Injectable 100 MICROGram(s) IV Push every 4 hours PRN MV synchr      Diet, NPO with Tube Feed:   Tube Feeding Modality: Nasogastric  Pulmocare  Total Volume for 24 Hours (mL): 720  Continuous  Starting Tube Feed Rate mL per Hour: 30  Until Goal Tube Feed Rate (mL per Hour): 30  Tube Feed Duration (in Hours): 24  Tube Feed Start Time: 18:00 (22 @ 14:51) [Active]          Vital Signs Last 24 Hrs  T(C): 36.8 (25 Dec 2022 07:32), Max: 36.8 (24 Dec 2022 23:53)  T(F): 98.2 (25 Dec 2022 07:32), Max: 98.2 (24 Dec 2022 23:53)  HR: 83 (25 Dec 2022 09:14) (79 - 97)  BP: 99/79 (25 Dec 2022 08:00) (80/60 - 113/84)  BP(mean): 87 (25 Dec 2022 08:00) (68 - 94)  RR: 20 (25 Dec 2022 08:00) (17 - 20)  SpO2: 98% (25 Dec 2022 09:14) (97% - 100%)    Parameters below as of 25 Dec 2022 08:32  Patient On (Oxygen Delivery Method): ventilator,40          22 @ 07:01  -  22 @ 07:00  --------------------------------------------------------  IN: 200 mL / OUT: 700 mL / NET: -500 mL        Mode: AC/ CMV (Assist Control/ Continuous Mandatory Ventilation), RR (machine): 20, TV (machine): 400, FiO2: 40, PEEP: 5, ITime: 1, MAP: 9, PIP: 21      LABS:                        15.2   20.28 )-----------( 176      ( 25 Dec 2022 06:27 )             47.2     12-25    148<H>  |  106  |  94<H>  ----------------------------<  137<H>  4.7   |  28  |  4.04<H>    Ca    8.8      25 Dec 2022 06:27  Phos  4.1     12-24  Mg     2.2     12-24    TPro  6.5  /  Alb  3.2<L>  /  TBili  2.3<H>  /  DBili  1.6<H>  /  AST  1766<H>  /  ALT  2405<H>  /  AlkPhos  312<H>  12-25    PT/INR - ( 24 Dec 2022 07:03 )   PT: 37.2 sec;   INR: 3.12 ratio         PTT - ( 23 Dec 2022 10:16 )  PTT:34.9 sec  Urinalysis Basic - ( 23 Dec 2022 11:14 )    Color: Yellow / Appearance: Turbid / S.010 / pH: x  Gluc: x / Ketone: Negative  / Bili: Negative / Urobili: Negative mg/dL   Blood: x / Protein: 100 mg/dL / Nitrite: Negative   Leuk Esterase: Moderate / RBC: 6-10 /HPF / WBC 26-50   Sq Epi: x / Non Sq Epi: Occasional / Bacteria: Occasional        ABG - ( 25 Dec 2022 06:19 )  pH, Arterial: 7.43  pH, Blood: x     /  pCO2: 44    /  pO2: 108   / HCO3: 29    / Base Excess: 4.9   /  SaO2: 98.8                WBC:  WBC Count: 20.28 K/uL ( @ 06:27)  WBC Count: 21.77 K/uL ( @ 07:03)  WBC Count: 19.23 K/uL ( @ 20:37)  WBC Count: 9.66 K/uL ( @ 10:16)      MICROBIOLOGY:  RECENT CULTURES:   ET Tube ET Tube XXXX   Numerous polymorphonuclear leukocytes per low power field  No Squamous epithelial cells per low power field  Rare Gram Positive Rods seen per oil power field XXXX     .Blood Blood-Peripheral XXXX XXXX   No growth to date.     Clean Catch Clean Catch (Midstream) XXXX XXXX   >100,000 CFU/ml Gram Negative Rods     .Blood Blood-Peripheral XXXX XXXX   No growth to date.                PT/INR - ( 24 Dec 2022 07:03 )   PT: 37.2 sec;   INR: 3.12 ratio         PTT - ( 23 Dec 2022 10:16 )  PTT:34.9 sec    Sodium:  Sodium, Serum: 148 mmol/L ( @ 06:27)  Sodium, Serum: 147 mmol/L ( @ 07:03)  Sodium, Serum: 146 mmol/L ( @ 20:37)  Sodium, Serum: 142 mmol/L ( @ 10:16)      4.04 mg/dL  @ 06:27  3.19 mg/dL  @ 07:03  2.65 mg/dL  @ 20:37  1.70 mg/dL  @ 10:16      Hemoglobin:  Hemoglobin: 15.2 g/dL ( @ 06:27)  Hemoglobin: 15.4 g/dL ( @ 07:03)  Hemoglobin: 16.3 g/dL ( @ 20:37)  Hemoglobin: 16.6 g/dL ( @ 10:16)      Platelets: Platelet Count - Automated: 176 K/uL ( @ 06:27)  Platelet Count - Automated: 176 K/uL ( @ 07:03)  Platelet Count - Automated: 195 K/uL ( @ 20:37)  Platelet Count - Automated: 146 K/uL ( @ 10:16)      LIVER FUNCTIONS - ( 25 Dec 2022 06:27 )  Alb: 3.2 g/dL / Pro: 6.5 g/dL / ALK PHOS: 312 U/L / ALT: 2405 U/L DA / AST: 1766 U/L / GGT: x             Urinalysis Basic - ( 23 Dec 2022 11:14 )    Color: Yellow / Appearance: Turbid / S.010 / pH: x  Gluc: x / Ketone: Negative  / Bili: Negative / Urobili: Negative mg/dL   Blood: x / Protein: 100 mg/dL / Nitrite: Negative   Leuk Esterase: Moderate / RBC: 6-10 /HPF / WBC 26-50   Sq Epi: x / Non Sq Epi: Occasional / Bacteria: Occasional        RADIOLOGY & ADDITIONAL STUDIES:      MICROBIOLOGY:  RECENT CULTURES:   ET Tube ET Tube XXXX   Numerous polymorphonuclear leukocytes per low power field  No Squamous epithelial cells per low power field  Rare Gram Positive Rods seen per oil power field XXXX     .Blood Blood-Peripheral XXXX XXXX   No growth to date.     Clean Catch Clean Catch (Midstream) XXXX XXXX   >100,000 CFU/ml Gram Negative Rods     .Blood Blood-Peripheral XXXX XXXX   No growth to date.

## 2022-12-25 NOTE — PROGRESS NOTE ADULT - SUBJECTIVE AND OBJECTIVE BOX
Patient is a 62y old  Male who presents with a chief complaint of resp failure (25 Dec 2022 11:01)        HPI:  62M CAD, Dilated cardiomyopathy, S/p AICD, Afib/flutter, h/o substance abuse, CKD baseline creat approx 1.4 last admitted to [facility] with MSSA bacteremia, and CHF.  Sent from [facility] with acute hypoxic respiratory failure.  Was initially on BiPAP then became hypoxic.  Anesthesia intubated patient.  On post intubation Xray pneumothorax evident.  Patient with PEA arrest.  Chest tube placed by ED physician.  ROSC obtained.     Patient unable to give further history.     (23 Dec 2022 13:02)      SUBJECTIVE & OBJECTIVE: Pt seen and examined at bedside. intubated, vent     PHYSICAL EXAM:  T(C): 36.7 (12-25-22 @ 11:32), Max: 36.8 (12-24-22 @ 23:53)  HR: 91 (12-25-22 @ 11:36) (78 - 97)  BP: 107/81 (12-25-22 @ 11:36) (80/60 - 113/84)  RR: 18 (12-25-22 @ 11:36) (17 - 20)  SpO2: 97% (12-25-22 @ 11:36) (97% - 100%)  Wt(kg): --   GENERAL vent  NECK: Supple, No JVD  NERVOUS SYSTEM:  Alert  CHEST/LUNG: vent depednet, decrease air entry at hte bases   HEART: Regular rate and rhythm; No murmurs, rubs, or gallops  ABDOMEN: Soft, Nontender, Nondistended; Bowel sounds present  EXTREMITIES:  2+ Peripheral Pulses, No clubbing, cyanosis, or edema        MEDICATIONS  (STANDING):  albuterol    90 MICROgram(s) HFA Inhaler 2 Puff(s) Inhalation every 6 hours  albuterol/ipratropium for Nebulization. 3 milliLiter(s) Nebulizer once  aspirin  chewable 81 milliGRAM(s) Oral daily  chlorhexidine 0.12% Liquid 15 milliLiter(s) Oral Mucosa every 12 hours  dextrose 5%. 1000 milliLiter(s) (100 mL/Hr) IV Continuous <Continuous>  dextrose 5%. 1000 milliLiter(s) (50 mL/Hr) IV Continuous <Continuous>  dextrose 50% Injectable 25 Gram(s) IV Push once  dextrose 50% Injectable 12.5 Gram(s) IV Push once  dextrose 50% Injectable 25 Gram(s) IV Push once  glucagon  Injectable 1 milliGRAM(s) IntraMuscular once  heparin   Injectable 5000 Unit(s) SubCutaneous every 12 hours  insulin lispro (ADMELOG) corrective regimen sliding scale   SubCutaneous every 6 hours  ipratropium 17 MICROgram(s) HFA Inhaler 1 Puff(s) Inhalation every 6 hours  methylPREDNISolone sodium succinate Injectable 40 milliGRAM(s) IV Push daily  pantoprazole  Injectable 40 milliGRAM(s) IV Push daily  piperacillin/tazobactam IVPB.. 3.375 Gram(s) IV Intermittent every 8 hours    MEDICATIONS  (PRN):  dextrose Oral Gel 15 Gram(s) Oral once PRN Blood Glucose LESS THAN 70 milliGRAM(s)/deciliter  fentaNYL    Injectable 100 MICROGram(s) IV Push every 4 hours PRN MV synchr      LABS:                        15.2   20.28 )-----------( 176      ( 25 Dec 2022 06:27 )             47.2     12-25    148<H>  |  106  |  94<H>  ----------------------------<  137<H>  4.7   |  28  |  4.04<H>    Ca    8.8      25 Dec 2022 06:27  Phos  4.1     12-24  Mg     2.2     12-24    TPro  6.5  /  Alb  3.2<L>  /  TBili  2.3<H>  /  DBili  1.6<H>  /  AST  1766<H>  /  ALT  2405<H>  /  AlkPhos  312<H>  12-25    PT/INR - ( 24 Dec 2022 07:03 )   PT: 37.2 sec;   INR: 3.12 ratio               CAPILLARY BLOOD GLUCOSE      POCT Blood Glucose.: 118 mg/dL (25 Dec 2022 05:39)  POCT Blood Glucose.: 138 mg/dL (24 Dec 2022 23:42)  POCT Blood Glucose.: 146 mg/dL (24 Dec 2022 17:04)      CAPILLARY BLOOD GLUCOSE      POCT Blood Glucose.: 118 mg/dL (25 Dec 2022 05:39)  POCT Blood Glucose.: 138 mg/dL (24 Dec 2022 23:42)  POCT Blood Glucose.: 146 mg/dL (24 Dec 2022 17:04)    CAPILLARY BLOOD GLUCOSE      POCT Blood Glucose.: 118 mg/dL (25 Dec 2022 05:39)    ABG - ( 25 Dec 2022 06:19 )  pH, Arterial: 7.43  pH, Blood: x     /  pCO2: 44    /  pO2: 108   / HCO3: 29    / Base Excess: 4.9   /  SaO2: 98.8                    RECENT CULTURES:      RADIOLOGY & ADDITIONAL TESTS:                        DVT/GI ppx  Discussed with pt @ bedside

## 2022-12-25 NOTE — PROGRESS NOTE ADULT - ASSESSMENT
BIPIN DE LA CRUZ 61 m 2/11/2022 1960 DR KELVIN VANESSA     REVIEW OF SYMPTOMS      Able to give (reliable) ROS  NO     PHYSICAL EXAM    HEENT Unremarkable  atraumatic   RESP Fair air entry EXP prolonged    Harsh breath sound Resp distres mild   CARDIAC S1 S2 No S3     NO JVD    ABDOMEN SOFT BS PRESENT NOT DISTENDED No hepatosplenomegaly   PEDAL EDEMA present No calf tenderness  NO rash       GENERAL DATA .   GOC.   12/23/2022 full code  ALLGY.     nka                  WT.     12/24/2022 81           BMI.       12/24/2022 26          ICU STAY. .. 12/23/2022  COVID. ..  12/23/2022 scv2 (-)   BEST PRACTICE ISSUES.    HOB ELEVATN. Yes  DVT PPLX. ..    12/23/2022 hpsc   WHITMORE PPLX. ..    12/23/2022 protonix 40   INFN PPLX. ..  12/23/2022 chlorhexidine .12%   SP SW VIVIAN.         DIET.  ..  12/25/2022 glucerna 960 ng   IV fl...      PROCEDURES...   12/23/2022  l chest tube   12/23/2022 intubated   12/23/2022 reed       ABGS.  12/25/2022 ac 40% 743/44/108   12/24/2022 ac 60% 746/41/190   12/23/2022 11 a 100% 705/95/68     VS/ PO/IO/ VENT/ DRIPS.   12/25/2022 80 90/60   12/25/2022 ac 20 400 5 40%     PREV ADMISSION 2/11-2/25/2022 [facility]    MAIN ISSUES.  62 m doa 12/23/2022 resp distress  PMH COPD  PMH COVID (+) 2/11/2022   PMH S aureus bacteremia mssa 2/11   .. Ancef 2/12/2022 Dr Phan  [facility] AICD  PMH HFREF  .. ECHO 11/20/2021 ef 20%  PMH A fib (on eliquis)     CAC 12/23/2022 rosc 25 min   Intubation 12/23/2022  Vent mgmt 12/23/2022   sedation.  .. 12/23/2022 fentanyl 100.4p   Pneumothorax.  .. 12/23/2022 cxr tension pntx  .. 12/23/2022 chets tube inserted   .. cxr pntx reslvd   Infection.  Possible aspn pneum 12/23/2022  Possible uti 12/23/2022   .. ua 12/23/2022 w 26-50   .. mrsa 12/23(+)   .. 12/23/2022 zosyn   Lacticemia.  .. la 12/23-12/24/2022 la 3.2  - 1.9   elevated lfts.  .. LFTS 12/23-12/24-12/25/2022       - 390-312     - 0714 - 1766      - 2066 - 2405   DERRICK.  .. Cr 12/23-12/24/2022 Cr 1.7- 3.1    Hypernatremia.  .. Na 12/24/2022 Na 147   Anoxic encephalo  .. cac 12/23 rosc 25   .. 12/25/2022 neuro clld     HOME MEDS INCLUDE.  apixaba 5.2   sacubitril valsartan 24 26    bumetanide 2   spironolactone 25  protonix 40   amiodarone 200   .     PROBLEMS ASSESSMENT RECOMMENDATIONS.  Intubation 12/23/2022  Vent mgmnt.   .. bundle dsv dsbt ltvv pplat 30 (-) PO2 60 (+) ph 7.3 (+)  sedation.  .. 12/23/2022 fentanyl 100.4p   COPD.  .. 12/23/2022 combivent .4     .. 12/23/2022 solumed 40.3 -> 12/24/2022 solumed 40   .. 12/23/2022 will taper to 40 on 12/24   Code 12/23/2022    .. 12/23/2022 was  restless when he came in No defib  .. 12/23/2022 coded at 12.13 p pea arrest about 25 min rosc   .. 12/23/2022 monitor neuro recovery  .. 12/24/2022 eyes open gag (+) does not follow commands   Pneumothorax.  .. 12/23/2022 cxr tension pntx  .. 12/23/2022 chets tube inserted  .. cxr 12/24/2022 et tube ng tube cardiac device cm sm r effs and basal infiltr l chest tube npo pntx sc emphysema improvd   .. 12/24/2022 bleeding at chest tube insertion site and scant drainage   .. 12/24/2022 above chest tube issues dw Dr Borges and she felkt bleeding likely sec recent apixaba and no drainage likely sec to resolution of pntx  and advised observation   .. follow clinically and with imaging   .. 12/24/2022 plan is to wean off vent and then plan chest tube remoival   .. pt is unresponsive   Infection.   Possible aspn pneum 12/23/2022  Possible uti 12/23/2022   .. W 12/23- 12/24-12/25/2022 w 9.6 - 21 - 20   .. pr 12/24-12/25/2022 pr 31- 24     .. ua 12/23/2022 w 26-50   .. ct cap 12/23 l chest tube sm l pntx chr small loculated r pl effs mod retropneumoperitonuem cw barotrauma   .. flu ab 12/23/2022 (-)  .. rsv 12/23/2022 (-)   .. mrsa 12/23 (+)   .. 12/23 zosyn   .. 12/25/2022 Vanco 1 dose given by id   .. bsab follow cult   .. 12/24/2022 dw Dr Frazier To add vanco   Lacticemia.  .. la 12/23-12/24/2022 la 3.2  - 1.9   .. Fluids and monitor  CAD.  .. Tr 12/24-12/25/2022 Tr 259 -139  .. 12/23/2022 asa 81   .. monitor   CHF.  .. bnp 12/25/2022 70k   .. ho chf   .. chf meds to be reintroduced by cardio as allowed by bp   elevated lfts.  .. LFTS 12/23-12/24-12/25 /2022       - 390-312     - 2524 - 1766      - 2066 - 2405   .. 12/23/2022 check ct cap  .. 12/23/2022 check hep profile   .. 12/23/2022 follow serially  .. elevcated lfts likely sec to anoxic hepatopathy during cac   DERRICK.  .. Cr 12/23-12/24-12/25/2022 Cr 1.7- 3.1- 4   .. uo 12/25/2022 400     .. fluids and serial monitoring  .. 12/25/2022 renal calld   Hypernatremia.  .. Na 12/24-12/25/2022 Na 147 - 148    AMS.  .. 12/25/2022 remains unresponsive   .. 12/25/2022 Nurse tells me that no sedation is being required   .. 12/23/2022 check ct head  .. 12/25/2022 neuro consultd     TIME SPENT   Over 39 minutes aggregate critical care time spent on encounter; activities included   direct patient care, counseling and/or coordinating care reviewing notes, lab data/ imaging , discussion with multidisciplinary team/ patient  /family and explaining in detail risks, benefits, alternatives  of the recommendations     BIPIN DE LA CRUZ 61 m 12/23/2022 1960 DR KELVIN VANESSA

## 2022-12-25 NOTE — PROGRESS NOTE ADULT - ASSESSMENT
Impression - Pt is a 62 year old male PMHx of CAD, Dilated cardiomyopathy, S/p AICD, Afib/flutter, h/o substance abuse, CKD baseline creat approx 1.4 last admitted to [facility] with MSSA bacteremia, and CHF.  He was Sent from [facility] with acute hypoxic respiratory failure. Was initially on BiPAP then became hypoxic.  Anesthesia intubated patient.  On post intubation Xray showed pneumothorax with worsening respiratory status and eventual P{EA arrest.  ACLS established Chest tube placed by ED physician then ROSC obtained. Tonight he remains on mechanical ventilation not in shock state with maps in the 64 to 68 range, initial ABG showed ph of 7.05 with PCO2 of 95 with repeat improvement to Ph 7.33 Pco2 42. Serum lactate is trending downward from 5.5 to 3.2, He currently has likely Aspiration PNA with loculated pleural effusions and is on antibiotics, aggressive IV fluid hydration is held due to volume overload with PRoBnp of 31,000 and physical exam tonight with rales in the lung bases bilaterally via ascultation. Left sided chest tube remains in place for tension pneumothorax during cardiac arrest resuscitated efforts, post chest xray shows the left lung expanded. Leukocytosis mixed component of reactive leukocytosis and aspiration PNA with cultures pending. Patient remains on full vent support, no sedation gtt. Overall poor prognosis; day team unable to contact/communicate with any next of kin for GOC.     Acute hypoxic respiratory failure   Cardiac arrest   ASP PNA  LT PTX s/p chest tube   Transaminitis, worsening   DERRICK    Neuro - Patient not on any sedation and not responding to verbal cues or withdrawing from painful stimuli, pupils equal and reactive to light B/L, but no EOMs, CTH negative for acute pathology, Utox negative   CV - MAP>65, not on pressors, +trop, BNP 31K  Pulm - Full vent support, maintain 6cc/kg of IBW, actively titrating FIO2 for SPO2>90%, currently down to 40%, trend abg, LT PTX s/p chest tube, on suction, pneumomediastinum, ASP PNA on standing Zosyn, continue albuterol and Atrovent SBT as clinically warranted  GI - NPO, daily ppi  Renal - DERRICK w/CTN up-trending, continue IVF, trend bmp, trend lytes, replete as needed, reed cath for strict I/Os, avoid nephrotoxic agents   Endocrine - Likely shock liver 2/2 cardiac arrest, LFTs worsening, continue to trend, avoid hepatotoxic agents   Heme - DVT PPx w/UFH, on ASA  ID - Leukocytosis up-trending, trend cbc, on standing Zosyn, lactate cleared, BCx NGT, cystitis noted on CT already on Zosyn, ID following

## 2022-12-25 NOTE — PROGRESS NOTE ADULT - SUBJECTIVE AND OBJECTIVE BOX
Patient is a 62y Male with a known history of :    HPI:  62M CAD, Dilated cardiomyopathy, S/p AICD, Afib/flutter, h/o substance abuse, CKD baseline creat approx 1.4 last admitted to [facility] with MSSA bacteremia, and CHF.  Sent from [facility] with acute hypoxic respiratory failure.  Was initially on BiPAP then became hypoxic.  Anesthesia intubated patient.  On post intubation Xray pneumothorax evident.  Patient with PEA arrest.  Chest tube placed by ED physician.  ROSC obtained.     Patient unable to give further history.     (23 Dec 2022 13:02)      REVIEW OF SYSTEMS:    CONSTITUTIONAL: No fever, weight loss, or fatigue  EYES: No eye pain, visual disturbances, or discharge  ENMT:  No difficulty hearing, tinnitus, vertigo; No sinus or throat pain  NECK: No pain or stiffness  BREASTS: No pain, masses, or nipple discharge  RESPIRATORY: No cough, wheezing, chills or hemoptysis; No shortness of breath  CARDIOVASCULAR: No chest pain, palpitations, dizziness, or leg swelling  GASTROINTESTINAL: No abdominal or epigastric pain. No nausea, vomiting, or hematemesis; No diarrhea or constipation. No melena or hematochezia.  GENITOURINARY: No dysuria, frequency, hematuria, or incontinence  NEUROLOGICAL: No headaches, memory loss, loss of strength, numbness, or tremors  SKIN: No itching, burning, rashes, or lesions   LYMPH NODES: No enlarged glands  ENDOCRINE: No heat or cold intolerance; No hair loss  MUSCULOSKELETAL: No joint pain or swelling; No muscle, back, or extremity pain  PSYCHIATRIC: No depression, anxiety, mood swings, or difficulty sleeping  HEME/LYMPH: No easy bruising, or bleeding gums  ALLERGY AND IMMUNOLOGIC: No hives or eczema    MEDICATIONS  (STANDING):  albuterol    90 MICROgram(s) HFA Inhaler 2 Puff(s) Inhalation every 6 hours  albuterol/ipratropium for Nebulization. 3 milliLiter(s) Nebulizer once  aspirin  chewable 81 milliGRAM(s) Oral daily  chlorhexidine 0.12% Liquid 15 milliLiter(s) Oral Mucosa every 12 hours  dextrose 5%. 1000 milliLiter(s) (100 mL/Hr) IV Continuous <Continuous>  dextrose 5%. 1000 milliLiter(s) (50 mL/Hr) IV Continuous <Continuous>  dextrose 50% Injectable 25 Gram(s) IV Push once  dextrose 50% Injectable 12.5 Gram(s) IV Push once  dextrose 50% Injectable 25 Gram(s) IV Push once  glucagon  Injectable 1 milliGRAM(s) IntraMuscular once  heparin   Injectable 5000 Unit(s) SubCutaneous every 12 hours  insulin lispro (ADMELOG) corrective regimen sliding scale   SubCutaneous every 6 hours  ipratropium 17 MICROgram(s) HFA Inhaler 1 Puff(s) Inhalation every 6 hours  methylPREDNISolone sodium succinate Injectable 40 milliGRAM(s) IV Push daily  pantoprazole  Injectable 40 milliGRAM(s) IV Push daily  piperacillin/tazobactam IVPB.. 3.375 Gram(s) IV Intermittent every 8 hours    MEDICATIONS  (PRN):  dextrose Oral Gel 15 Gram(s) Oral once PRN Blood Glucose LESS THAN 70 milliGRAM(s)/deciliter  fentaNYL    Injectable 100 MICROGram(s) IV Push every 4 hours PRN MV synchr      ALLERGIES: No Known Allergies      FAMILY HISTORY:  FH: lung cancer        Social history:  Alochol:   Smoking:   Drug Use:   Marital Status:     PHYSICAL EXAMINATION:  -----------------------------  T(C): 36.8 (12-25-22 @ 07:32), Max: 36.8 (12-24-22 @ 23:53)  HR: 83 (12-25-22 @ 09:14) (79 - 97)  BP: 99/79 (12-25-22 @ 08:00) (80/60 - 113/84)  RR: 20 (12-25-22 @ 08:00) (17 - 20)  SpO2: 98% (12-25-22 @ 09:14) (97% - 100%)  Wt(kg): --    12-24 @ 07:01  -  12-25 @ 07:00  --------------------------------------------------------  IN:    IV PiggyBack: 200 mL  Total IN: 200 mL    OUT:    Chest Tube (mL): 0 mL    Indwelling Catheter - Urethral (mL): 700 mL  Total OUT: 700 mL    Total NET: -500 mL            Constitutional: well developed, normal appearance, well groomed, well nourished, no deformities and no acute distress.   Eyes: the conjunctiva exhibited no abnormalities and the eyelids demonstrated no xanthelasmas.   HEENT: normal oral mucosa, no oral pallor and no oral cyanosis.   Neck: normal jugular venous A waves present, normal jugular venous V waves present and no jugular venous malone A waves.   Pulmonary: no respiratory distress, normal respiratory rhythm and effort, no accessory muscle use and lungs were clear to auscultation bilaterally. Anteriorly  Cardiovascular: heart rate and rhythm were normal, normal S1 and S2 and no murmur, gallop, rub, heave or thrill are present.   Musculoskeletal: the gait could not be assessed.   Extremities: no clubbing of the fingernails, no localized cyanosis, no petechial hemorrhages and no ischemic changes.   Skin: normal skin color and pigmentation, no rash, no venous stasis, no skin lesions, no skin ulcer and no xanthoma was observed.       LABS:   --------  12-25    148<H>  |  106  |  94<H>  ----------------------------<  137<H>  4.7   |  28  |  4.04<H>    Ca    8.8      25 Dec 2022 06:27  Phos  4.1     12-24  Mg     2.2     12-24    TPro  6.5  /  Alb  3.2<L>  /  TBili  2.3<H>  /  DBili  1.6<H>  /  AST  1766<H>  /  ALT  2405<H>  /  AlkPhos  312<H>  12-25                         15.2   20.28 )-----------( 176      ( 25 Dec 2022 06:27 )             47.2     PT/INR - ( 24 Dec 2022 07:03 )   PT: 37.2 sec;   INR: 3.12 ratio           12-25 @ 06:27 BNP: >81921 pg/mL        Culture Results:   No growth to date. (12-23 @ 14:06)  Culture Results:   >100,000 CFU/ml Gram Negative Rods (12-23 @ 11:16)    12-24 @ 17:43    Organism --   Gram Stain Blood -- Gram Stain   Numerous polymorphonuclear leukocytes per low power field  No Squamous epithelial cells per low power field  Rare Gram Positive Rods seen per oil power field  Specimen Source ET Tube ET Tube  Culture-Blood --    12-23 @ 14:06    Organism --   Gram Stain Blood -- Gram Stain --  Specimen Source .Blood Blood-Peripheral  Culture-Blood --    12-23 @ 11:16    Organism --   Gram Stain Blood -- Gram Stain --  Specimen Source Clean Catch Clean Catch (Midstream)  Culture-Blood --        Radiology:

## 2022-12-25 NOTE — PROGRESS NOTE ADULT - SUBJECTIVE AND OBJECTIVE BOX
\[facility] [place] [facility]\"", Division of Infectious Diseases  MARINA Mccray Y. Patel, S. Shah, G. [facility]  [phone]    Name: DOROTHY VOSS  Age: 62y  Gender: Male  MRN: [id number]    Interval History:  Patient seen and examined at bedside  No acute overnight events. Afebrile  Continues to remain critically ill, intubated  Notes reviewed    Antibiotics:  piperacillin/tazobactam IVPB.. 3.375 Gram(s) IV Intermittent every 8 hours      Medications:  albuterol    90 MICROgram(s) HFA Inhaler 2 Puff(s) Inhalation every 6 hours  albuterol/ipratropium for Nebulization. 3 milliLiter(s) Nebulizer once  aspirin  chewable 81 milliGRAM(s) Oral daily  chlorhexidine 0.12% Liquid 15 milliLiter(s) Oral Mucosa every 12 hours  dextrose 5%. 1000 milliLiter(s) IV Continuous <Continuous>  dextrose 5%. 1000 milliLiter(s) IV Continuous <Continuous>  dextrose 50% Injectable 25 Gram(s) IV Push once  dextrose 50% Injectable 12.5 Gram(s) IV Push once  dextrose 50% Injectable 25 Gram(s) IV Push once  dextrose Oral Gel 15 Gram(s) Oral once PRN  fentaNYL    Injectable 100 MICROGram(s) IV Push every 4 hours PRN  glucagon  Injectable 1 milliGRAM(s) IntraMuscular once  heparin   Injectable 5000 Unit(s) SubCutaneous every 12 hours  insulin lispro (ADMELOG) corrective regimen sliding scale   SubCutaneous every 6 hours  ipratropium 17 MICROgram(s) HFA Inhaler 1 Puff(s) Inhalation every 6 hours  methylPREDNISolone sodium succinate Injectable 40 milliGRAM(s) IV Push daily  pantoprazole  Injectable 40 milliGRAM(s) IV Push daily  piperacillin/tazobactam IVPB.. 3.375 Gram(s) IV Intermittent every 8 hours      Review of Systems:  unable to obtain    Allergies: No Known Allergies    For details regarding the patient's past medical history, social history, family history, and other miscellaneous elements, please refer the initial infectious diseases consultation and/or the admitting history and physical examination for this admission.    Objective:  Vitals:   T(C): 36.7 (12-25-22 @ 11:32), Max: 36.8 (12-24-22 @ 23:53)  HR: 90 (12-25-22 @ 13:41) (78 - 91)  BP: 87/64 (12-25-22 @ 13:00) (80/60 - 107/81)  RR: 20 (12-25-22 @ 13:00) (18 - 20)  SpO2: 98% (12-25-22 @ 13:41) (97% - 100%)    Physical Examination:  General: elderly M, intubated  HEENT: NC/AT, ETT in place  Lungs: MV breath sounds  Chest: L CT in place  Heart: RRR  Abdomen: Soft.   Extremities: No cyanosis or clubbing. No edema.         Laboratory Studies:  CBC:                       15.2   20.28 )-----------( 176      ( 25 Dec 2022 06:27 )             47.2     CMP: 12-25    148<H>  |  106  |  94<H>  ----------------------------<  137<H>  4.7   |  28  |  4.04<H>    Ca    8.8      25 Dec 2022 06:27  Phos  4.1     12-24  Mg     2.2     12-24    TPro  6.5  /  Alb  3.2<L>  /  TBili  2.3<H>  /  DBili  1.6<H>  /  AST  1766<H>  /  ALT  2405<H>  /  AlkPhos  312<H>  12-25    LIVER FUNCTIONS - ( 25 Dec 2022 06:27 )  Alb: 3.2 g/dL / Pro: 6.5 g/dL / ALK PHOS: 312 U/L / ALT: 2405 U/L DA / AST: 1766 U/L / GGT: x               Microbiology: reviewed    Culture - Sputum (collected 12-24-22 @ 17:43)  Source: ET Tube ET Tube  Gram Stain (12-25-22 @ 07:58):    Numerous polymorphonuclear leukocytes per low power field    No Squamous epithelial cells per low power field    Rare Gram Positive Rods seen per oil power field    Culture - Blood (collected 12-23-22 @ 14:06)  Source: .Blood Blood-Peripheral  Preliminary Report (12-24-22 @ 19:01):    No growth to date.    Culture - Urine (collected 12-23-22 @ 11:16)  Source: Clean Catch Clean Catch (Midstream)  Preliminary Report (12-25-22 @ 09:14):    >100,000 CFU/ml Gram Negative Rods    Culture - Blood (collected 12-23-22 @ 10:17)  Source: .Blood Blood-Peripheral  Preliminary Report (12-24-22 @ 18:01):    No growth to date.          Radiology: reviewed

## 2022-12-25 NOTE — PROGRESS NOTE ADULT - ASSESSMENT
The patient is a 62 year old male with a history of CAD, chronic systolic heart failure s/p ICD, atrial flutter s/p DCCV, substance abuse, CKD who is admitted with respiratory failure in the setting of tension pneumothorax complicated by cardiac arrest.    12/25/22  Seen at [facility] ICU  Intubated, sleeping comfortably  WBC-20.28  BUN/Creat-94/4.04-worsening  Troponin-139.8 trending down  Bili-2.3  Alk phos-312  SGOT-1766  SGPT-2405    Plan:  - ECG with sinus tachycardia and known LBBB  - Echo 2/22 with severely reduced LV (EF 10%) and RV function, mod MR, mod TR, mild pulm HTN  - Hold all heart failure medications for now (BB, Entresto, spironolactone, bumetanide)  - BP low normal  - Hold apixaban given DERRICK and likely will need additional procedures  - Continue aspirin 81 mg daily  - Chest tube in place  - IV antibiotics-zosyn, vancomycin  - Sedation  - Mechanical ventilation  - ICU care  - Being followed by Palliative care, Pulmonary, ID  - Overall very poor prognosis      35 minutes of critical care time spent with the patient and coordinating care with nursing, hospitalist, and consultants. Patient is critically ill requiring ICU care. The patient is high risk for deterioration and death.

## 2022-12-26 NOTE — PROGRESS NOTE ADULT - SUBJECTIVE AND OBJECTIVE BOX
Patient is a 62y Male with a known history of :    HPI:  62M CAD, Dilated cardiomyopathy, S/p AICD, Afib/flutter, h/o substance abuse, CKD baseline creat approx 1.4 last admitted to [facility] with MSSA bacteremia, and CHF.  Sent from [facility] with acute hypoxic respiratory failure.  Was initially on BiPAP then became hypoxic.  Anesthesia intubated patient.  On post intubation Xray pneumothorax evident.  Patient with PEA arrest.  Chest tube placed by ED physician.  ROSC obtained.     Patient unable to give further history.     (23 Dec 2022 13:02)      REVIEW OF SYSTEMS:    CONSTITUTIONAL: No fever, weight loss, or fatigue  EYES: No eye pain, visual disturbances, or discharge  ENMT:  No difficulty hearing, tinnitus, vertigo; No sinus or throat pain  NECK: No pain or stiffness  BREASTS: No pain, masses, or nipple discharge  RESPIRATORY: No cough, wheezing, chills or hemoptysis; No shortness of breath  CARDIOVASCULAR: No chest pain, palpitations, dizziness, or leg swelling  GASTROINTESTINAL: No abdominal or epigastric pain. No nausea, vomiting, or hematemesis; No diarrhea or constipation. No melena or hematochezia.  GENITOURINARY: No dysuria, frequency, hematuria, or incontinence  NEUROLOGICAL: No headaches, memory loss, loss of strength, numbness, or tremors  SKIN: No itching, burning, rashes, or lesions   LYMPH NODES: No enlarged glands  ENDOCRINE: No heat or cold intolerance; No hair loss  MUSCULOSKELETAL: No joint pain or swelling; No muscle, back, or extremity pain  PSYCHIATRIC: No depression, anxiety, mood swings, or difficulty sleeping  HEME/LYMPH: No easy bruising, or bleeding gums  ALLERGY AND IMMUNOLOGIC: No hives or eczema    MEDICATIONS  (STANDING):  albuterol    90 MICROgram(s) HFA Inhaler 2 Puff(s) Inhalation every 6 hours  albuterol/ipratropium for Nebulization. 3 milliLiter(s) Nebulizer once  aspirin  chewable 81 milliGRAM(s) Oral daily  chlorhexidine 0.12% Liquid 15 milliLiter(s) Oral Mucosa every 12 hours  dextrose 5% + sodium chloride 0.45%. 1000 milliLiter(s) (75 mL/Hr) IV Continuous <Continuous>  dextrose 5%. 1000 milliLiter(s) (100 mL/Hr) IV Continuous <Continuous>  dextrose 50% Injectable 25 Gram(s) IV Push once  dextrose 50% Injectable 12.5 Gram(s) IV Push once  dextrose 50% Injectable 25 Gram(s) IV Push once  glucagon  Injectable 1 milliGRAM(s) IntraMuscular once  heparin   Injectable 5000 Unit(s) SubCutaneous every 12 hours  insulin lispro (ADMELOG) corrective regimen sliding scale   SubCutaneous every 6 hours  ipratropium 17 MICROgram(s) HFA Inhaler 1 Puff(s) Inhalation every 6 hours  methylPREDNISolone sodium succinate Injectable 40 milliGRAM(s) IV Push daily  pantoprazole  Injectable 40 milliGRAM(s) IV Push daily  piperacillin/tazobactam IVPB.. 3.375 Gram(s) IV Intermittent every 8 hours    MEDICATIONS  (PRN):  dextrose Oral Gel 15 Gram(s) Oral once PRN Blood Glucose LESS THAN 70 milliGRAM(s)/deciliter  fentaNYL    Injectable 100 MICROGram(s) IV Push every 4 hours PRN MV synchr      ALLERGIES: No Known Allergies      FAMILY HISTORY:  FH: lung cancer        Social history:  Alochol:   Smoking:   Drug Use:   Marital Status:     PHYSICAL EXAMINATION:  -----------------------------  T(C): 36.6 (12-26-22 @ 04:06), Max: 37.2 (12-25-22 @ 16:14)  HR: 91 (12-26-22 @ 07:00) (78 - 94)  BP: 112/83 (12-26-22 @ 07:00) (80/61 - 112/83)  RR: 20 (12-26-22 @ 07:00) (14 - 23)  SpO2: 99% (12-26-22 @ 07:00) (97% - 99%)  Wt(kg): --    12-25 @ 07:01  -  12-26 @ 07:00  --------------------------------------------------------  IN:    Enteral Tube Flush: 425 mL    Glucerna 1.5: 580 mL    IV PiggyBack: 450 mL  Total IN: 1455 mL    OUT:    Indwelling Catheter - Urethral (mL): 800 mL  Total OUT: 800 mL    Total NET: 655 mL            Constitutional: well developed, normal appearance, well groomed, well nourished, no deformities and no acute distress.   Eyes: the conjunctiva exhibited no abnormalities and the eyelids demonstrated no xanthelasmas.   HEENT: normal oral mucosa, no oral pallor and no oral cyanosis.   Neck: normal jugular venous A waves present, normal jugular venous V waves present and no jugular venous malone A waves.   Pulmonary: no respiratory distress, normal respiratory rhythm and effort, no accessory muscle use and lungs were clear to auscultation bilaterally. Anteriorly  Cardiovascular: heart rate and rhythm were normal, normal S1 and S2 and no murmur, gallop, rub, heave or thrill are present.   Musculoskeletal: the gait could not be assessed.   Extremities: no clubbing of the fingernails, no localized cyanosis, no petechial hemorrhages and no ischemic changes.   Skin: normal skin color and pigmentation, no rash, no venous stasis, no skin lesions, no skin ulcer and no xanthoma was observed.       LABS:   --------  12-26    147<H>  |  107  |  119<H>  ----------------------------<  198<H>  4.5   |  28  |  4.58<H>    Ca    8.8      26 Dec 2022 06:46    TPro  6.9  /  Alb  2.9<L>  /  TBili  2.2<H>  /  DBili  x   /  AST  670<H>  /  ALT  1824<H>  /  AlkPhos  306<H>  12-26                         15.9   13.56 )-----------( 182      ( 26 Dec 2022 06:46 )             49.4             Culture Results:   Rare Pseudomonas aeruginosa (12-24 @ 17:43)    12-24 @ 17:43    Organism --   Gram Stain Blood -- Gram Stain   Numerous polymorphonuclear leukocytes per low power field  No Squamous epithelial cells per low power field  Rare Gram Positive Rods seen per oil power field  Specimen Source ET Tube ET Tube  Culture-Blood --        Radiology:

## 2022-12-26 NOTE — PROGRESS NOTE ADULT - ASSESSMENT
Pt is a 62M CAD, Dilated cardiomyopathy, S/p AICD, Afib/flutter, h/o substance abuse, CKD baseline creat approx 1.4 last admitted to [facility] with MSSA bacteremia, and CHF.    Sent from [facility] with acute hypoxic respiratory failure. Was initially on BiPAP then became hypoxic.  Anesthesia intubated patient.  On post intubation Xray pneumothorax evident.  Patient with PEA arrest.  Chest tube placed by ED physician.  ROSC obtained.       S/p PEA arrest  AHRF requiring intubation  ?Aspiration PNA   Loculated pleural effusions  - imaging reviewed as above  - CT reviewed  - BCx NGTD  - Sputum Cx -- Pseudomonas  - UCx -- Klebsiella  - c/w zosyn  - can hold additional vancomycin  - trend temps/WBC--elevated but stable  - maintain aspiration precautions  - ICU care    GOC per primary team    Covering weekend/holiday service through 12/26  Covering Dr. Phan  Infectious Diseases will continue to follow. Please call with any questions.   Shila Frazier M.D.  [facility] Division of Infectious Diseases [phone]

## 2022-12-26 NOTE — CONSULT NOTE ADULT - ASSESSMENT
62M CAD, Dilated cardiomyopathy, S/p AICD, Afib/flutter, h/o substance abuse, CKD baseline creat approx 1.4 last admitted to [facility] with MSSA bacteremia, and CHF.  Sent from [facility] with acute hypoxic respiratory failure/PEA arrest.  CT Head - No acute pathology.  Positive brain stem functions. Pt is breathing over the vent.   Hypoxic/Ischemic Encephalopathy. Only time could tell regarding the severity.   No seizure reported.  Rec Repeat CT head in 72 hrs from now.  D/w covering nurse.  Would follow.

## 2022-12-26 NOTE — PROGRESS NOTE ADULT - SUBJECTIVE AND OBJECTIVE BOX
Patient is a 62y old  Male who presents with a chief complaint of resp failure (26 Dec 2022 06:10)        HPI:  62M CAD, Dilated cardiomyopathy, S/p AICD, Afib/flutter, h/o substance abuse, CKD baseline creat approx 1.4 last admitted to [facility] with MSSA bacteremia, and CHF.  Sent from [facility] with acute hypoxic respiratory failure.  Was initially on BiPAP then became hypoxic.  Anesthesia intubated patient.  On post intubation Xray pneumothorax evident.  Patient with PEA arrest.  Chest tube placed by ED physician.  ROSC obtained.     Patient unable to give further history.     (23 Dec 2022 13:02)      SUBJECTIVE & OBJECTIVE: Pt seen and examined at bedside. intubated on vent     PHYSICAL EXAM:  T(C): 36.6 (12-26-22 @ 04:06), Max: 37.2 (12-25-22 @ 16:14)  HR: 91 (12-26-22 @ 07:00) (78 - 94)  BP: 112/83 (12-26-22 @ 07:00) (80/61 - 112/83)  RR: 20 (12-26-22 @ 07:00) (14 - 23)  SpO2: 99% (12-26-22 @ 07:00) (97% - 99%)  Wt(kg): --   GENERAL: vent. chest tube +  HEAD:  Atraumatic, Normocephalic  NECK: Supple, No JVD  NERVOUS SYSTEM:  Alert  CHEST/LUNG: decrease air entry at the bases   HEART: Regular rate and rhythm; No murmurs, rubs, or gallops  ABDOMEN: Soft, Nontender, Nondistended; Bowel sounds present  EXTREMITIES:  2+ Peripheral Pulses, No clubbing, cyanosis, or edema        MEDICATIONS  (STANDING):  albuterol    90 MICROgram(s) HFA Inhaler 2 Puff(s) Inhalation every 6 hours  albuterol/ipratropium for Nebulization. 3 milliLiter(s) Nebulizer once  aspirin  chewable 81 milliGRAM(s) Oral daily  chlorhexidine 0.12% Liquid 15 milliLiter(s) Oral Mucosa every 12 hours  dextrose 5%. 1000 milliLiter(s) (100 mL/Hr) IV Continuous <Continuous>  dextrose 5%. 1000 milliLiter(s) (50 mL/Hr) IV Continuous <Continuous>  dextrose 50% Injectable 25 Gram(s) IV Push once  dextrose 50% Injectable 12.5 Gram(s) IV Push once  dextrose 50% Injectable 25 Gram(s) IV Push once  glucagon  Injectable 1 milliGRAM(s) IntraMuscular once  heparin   Injectable 5000 Unit(s) SubCutaneous every 12 hours  insulin lispro (ADMELOG) corrective regimen sliding scale   SubCutaneous every 6 hours  ipratropium 17 MICROgram(s) HFA Inhaler 1 Puff(s) Inhalation every 6 hours  methylPREDNISolone sodium succinate Injectable 40 milliGRAM(s) IV Push daily  pantoprazole  Injectable 40 milliGRAM(s) IV Push daily  piperacillin/tazobactam IVPB.. 3.375 Gram(s) IV Intermittent every 8 hours    MEDICATIONS  (PRN):  dextrose Oral Gel 15 Gram(s) Oral once PRN Blood Glucose LESS THAN 70 milliGRAM(s)/deciliter  fentaNYL    Injectable 100 MICROGram(s) IV Push every 4 hours PRN MV synchr      LABS:                        15.9   13.56 )-----------( 182      ( 26 Dec 2022 06:46 )             49.4     12-26    147<H>  |  107  |  119<H>  ----------------------------<  198<H>  4.5   |  28  |  4.58<H>    Ca    8.8      26 Dec 2022 06:46    TPro  6.9  /  Alb  2.9<L>  /  TBili  2.2<H>  /  DBili  x   /  AST  670<H>  /  ALT  1824<H>  /  AlkPhos  306<H>  12-26          CAPILLARY BLOOD GLUCOSE      POCT Blood Glucose.: 152 mg/dL (26 Dec 2022 05:32)  POCT Blood Glucose.: 148 mg/dL (26 Dec 2022 00:10)  POCT Blood Glucose.: 145 mg/dL (25 Dec 2022 17:05)  POCT Blood Glucose.: 127 mg/dL (25 Dec 2022 12:17)      CAPILLARY BLOOD GLUCOSE      POCT Blood Glucose.: 152 mg/dL (26 Dec 2022 05:32)  POCT Blood Glucose.: 148 mg/dL (26 Dec 2022 00:10)  POCT Blood Glucose.: 145 mg/dL (25 Dec 2022 17:05)  POCT Blood Glucose.: 127 mg/dL (25 Dec 2022 12:17)    CAPILLARY BLOOD GLUCOSE      POCT Blood Glucose.: 152 mg/dL (26 Dec 2022 05:32)    ABG - ( 25 Dec 2022 06:19 )  pH, Arterial: 7.43  pH, Blood: x     /  pCO2: 44    /  pO2: 108   / HCO3: 29    / Base Excess: 4.9   /  SaO2: 98.8                    RECENT CULTURES:      RADIOLOGY & ADDITIONAL TESTS:                        DVT/GI ppx  Discussed with pt @ bedside

## 2022-12-26 NOTE — PROGRESS NOTE ADULT - ASSESSMENT
REVIEW OF SYMPTOMS      Able to give (reliable) ROS  NO     PHYSICAL EXAM    HEENT Unremarkable  atraumatic   RESP Fair air entry EXP prolonged    Harsh breath sound Resp distres mild   CARDIAC S1 S2 No S3     NO JVD    ABDOMEN SOFT BS PRESENT NOT DISTENDED No hepatosplenomegaly   PEDAL EDEMA present No calf tenderness  NO rash       GENERAL DATA .   GOC.   12/23/2022 full code  ALLGY.     nka                  WT.     12/24/2022 81           BMI.       12/24/2022 26          ICU STAY. .. 12/23/2022  COVID. ..  12/23/2022 scv2 (-)   BEST PRACTICE ISSUES.    HOB ELEVATN. Yes  DVT PPLX. ..    12/23/2022 hpsc   WHITMORE PPLX. ..    12/23/2022 protonix 40   INFN PPLX. ..  12/23/2022 chlorhexidine .12%   SP SW VIVIAN.         DIET.  ..  12/25/2022 glucerna 960 ng   IV fl... 12/26/2022 d5 50      PROCEDURES...   12/23/2022  l chest tube   12/23/2022 intubated   12/23/2022 reed       ABGS.  12/25/2022 ac 40% 743/44/108   12/24/2022 ac 60% 746/41/190   12/23/2022 11 a 100% 705/95/68     VS/ PO/IO/ VENT/ DRIPS.   12/26/2022 97 99/70   12/26/2022 ac 20/400/5/.35     PREV ADMISSION 2/11-2/25/2022 [facility]    MAIN ISSUES.  62 m doa 12/23/2022 resp distress  PMH COPD  PMH COVID (+) 2/11/2022   PMH S aureus bacteremia mssa 2/11   .. Ancef 2/12/2022 Dr Phan  [facility] AICD  PMH HFREF  .. ECHO 11/20/2021 ef 20%  PMH A fib (on eliquis)     CAC 12/23/2022 rosc 25 min   Intubation 12/23/2022  Vent mgmt 12/23/2022   sedation.  .. 12/23/2022 fentanyl 100.4p   Pneumothorax.  .. 12/23/2022 cxr tension pntx  .. 12/23/2022 chets tube inserted   .. cxr pntx reslvd   Infection.  Possible aspn pneum 12/23/2022  Possible uti 12/23/2022   .. ua 12/23/2022 w 26-50   .. mrsa 12/23 (+)   .. 12/24 et pseudo  .. 12/23 uc klebsiella   .. 12/23 zosyn   Lacticemia.  .. la 12/23-12/24/2022 la 3.2  - 1.9   elevated lfts.  .. LFTS 12/23-12/24-12/25-12/26/2022       - [phone]     - 4594 - 1766- 670      - 1536 - 2405 - 1824  DERRICK.  .. Cr 12/23-12/24/2022 Cr 1.7- 3.1    Hypernatremia.  .. Na 12/24/2022 Na 147   Anoxic encephalo  .. cac 12/23 rosc 25   .. 12/25/2022 neuro clld     HOME MEDS INCLUDE.  apixaba 5.2   sacubitril valsartan 24 26    bumetanide 2   spironolactone 25  protonix 40   amiodarone 200     PROBLEMS ASSESSMENT RECOMMENDATIONS.  Intubation 12/23/2022  Vent mgmnt.   .. bundle dsv dsbt ltvv pplat 30 (-) PO2 60 (+) ph 7.3 (+)  sedation.  .. 12/23/2022 fentanyl 100.4p   .. 12/26/2022 is not requiring sedation (anoxic encephalo)   COPD.  .. 12/23/2022 combivent .4     .. 12/23/2022 solumed 40.3 -> 12/24/2022 solumed 40   .. 12/23/2022 will taper to 40 on 12/24   .. cont rx  Code 12/23/2022    .. 12/23/2022 was  restless when he came in No defib  .. 12/23/2022 coded at 12.13 p pea arrest about 25 min rosc   .. 12/23/2022 monitor neuro recovery  .. 12/24/2022 eyes open gag (+) does not follow commands   Pneumothorax.  .. 12/23/2022 cxr tension pntx  .. 12/23/2022 chets tube inserted  .. cxr 12/24/2022 et tube ng tube cardiac device cm sm r effs and basal infiltr l chest tube npo pntx sc emphysema improvd   .. 12/24/2022 bleeding at chest tube insertion site and scant drainage   .. 12/24/2022 above chest tube issues dw Dr Borges and she felkt bleeding likely sec recent apixaba and no drainage likely sec to resolution of pntx  and advised observation   .. follow clinically and with imaging   .. 12/24/2022 plan is to wean off vent and then plan chest tube remoival   .. 12/26/2022 pt is unresponsive may not be weanable and may need trach peg and placement   Infection.   Possible aspn pneum 12/23/2022  Possible uti 12/23/2022   .. W 12/23- 12/24-12/25-12/26/2022 w 9.6 - 21 - 20- 13   .. pr 12/24-12/25/2022 pr 31- 24     .. ua 12/23/2022 w 26-50   .. ct cap 12/23 l chest tube sm l pntx chr small loculated r pl effs mod retropneumoperitonuem cw barotrauma   .. flu ab 12/23/2022 (-)  .. rsv 12/23/2022 (-)   .. bc 12/23 (-)   .. mrsa 12/23 (+)   .. 12/24 et pseudo  .. 12/23 uc klebsiella   .. 12/23 zosyn   .. 12/25/2022 Vanco 1 dose given by id   .. bsab follow cult   .. 12/24/2022 dw Dr Frazier To add vanco   Lacticemia.  .. la 12/23-12/24/2022 la 3.2  - 1.9   .. Fluids and monitor  CAD.  .. Tr 12/24-12/25/2022 Tr 259 -139  .. 12/23/2022 asa 81   .. monitor   CHF.  .. bnp 12/25/2022 70k   .. ho chf   .. chf meds to be reintroduced by cardio as allowed by bp   elevated lfts.  .. LFTS 12/23-12/24-12/25-12/26/2022       - [phone]     - 8190 - 5896- 670      - 2496 - 2405 - 1824  .. 12/23/2022  ct cap pneumoretroperitoneum cw barotrauma   .. 12/23/2022 check hep profile   .. 12/23/2022 follow serially  .. elevcated lfts likely sec to anoxic hepatopathy during cac   DERRICK.  .. Cr 12/23-12/24-12/25-12/26/2022 Cr 1.7- 3.1- 4 - 4.5   .. uo 12/25-12/26/2022 400  - 500    .. fluids and serial monitoring  .. 12/25/2022 renal calld   .. 12/26/2022 seen Dr Escalante feels derrick sec ATN recommended d5w   Hypernatremia.  .. Na 12/24-12/25-12/26/2022 Na 147 - 148 - 147  .. 12/26/2022 iv changed to d5 50    AMS.  .. 12/25/2022 remains unresponsive   .. 12/25/2022 Nurse tells me that no sedation is being required   .. 12/23/2022 ct head(-)   .. 12/25/2022 neuro consultd   .. 12/26/2022 pt seen by Dr Hyatt To repeat ct in 72h  .     TIME SPENT   Over 39 minutes aggregate critical care time spent on encounter; activities included   direct patient care, counseling and/or coordinating care reviewing notes, lab data/ imaging , discussion with multidisciplinary team/ patient  /family and explaining in detail risks, benefits, alternatives  of the recommendations     BIPIN DE LA CRUZ 61 m 12/23/2022 1962 DR KELVIN VANESSA

## 2022-12-26 NOTE — PROGRESS NOTE ADULT - SUBJECTIVE AND OBJECTIVE BOX
[facility]HY [facility]OSS    [facility]    Allergies    No Known Allergies    Intolerances    pork (Other)      PAST MEDICAL & SURGICAL HISTORY:  Heart failure, systolic      CAD (coronary artery disease)      Hypertension      Nonischemic cardiomyopathy      COPD, moderate      2019 novel coronavirus disease (COVID-19)      Substance abuse      HLD (hyperlipidemia)      Cardiac LV ejection fraction 10-20%      AICD (automatic cardioverter/defibrillator) present      Cardiac LV ejection fraction 10-20%          FAMILY HISTORY:  FH: lung cancer        Home Medications:  acetaminophen 325 mg oral tablet: 2 tab(s) orally every 6 hours, As needed, Temp greater or equal to 38C (100.4F), Mild Pain (1 - 3) (23 Dec 2022 10:07)  apixaban 5 mg oral tablet: 1 tab(s) orally every 12 hours (23 Dec 2022 10:07)  Aquaphor Healing topical ointment: Apply topically to affected area once a day (23 Dec 2022 10:07)  ascorbic acid 500 mg oral tablet: 1 tab(s) orally once a day (23 Dec 2022 10:07)  Bacid (LAC) oral capsule: 2 cap(s) orally once a day (23 Dec 2022 10:07)  bumetanide 2 mg oral tablet: 1 tab(s) orally 2 times a day (23 Dec 2022 10:07)  gabapentin 100 mg oral capsule: 1 cap(s) orally 3 times a day (23 Dec 2022 10:07)  melatonin 3 mg oral tablet: 1 tab(s) orally once a day (at bedtime), As needed, Insomnia (23 Dec 2022 10:07)  Multiple Vitamins with Minerals oral tablet: 1 tab(s) orally once a day (23 Dec 2022 10:07)  pantoprazole 40 mg oral delayed release tablet: 1 tab(s) orally once a day (before a meal) (23 Dec 2022 10:07)  sacubitril-valsartan 24 mg-26 mg oral tablet: 1 tab(s) orally 2 times a day (23 Dec 2022 10:07)  simethicone 80 mg oral tablet: 2 tab(s) orally every 6 hours, As Needed (23 Dec 2022 10:07)  spironolactone 25 mg oral tablet: 1 tab(s) orally once a day (23 Dec 2022 10:07)  Symbicort 160 mcg-4.5 mcg/inh inhalation aerosol: 2 puff(s) inhaled 2 times a day (23 Dec 2022 10:07)  Ventolin HFA 90 mcg/inh inhalation aerosol: 2 puff(s) inhaled every 6 hours, As Needed (23 Dec 2022 10:07)      MEDICATIONS  (STANDING):  albuterol    90 MICROgram(s) HFA Inhaler 2 Puff(s) Inhalation every 6 hours  albuterol/ipratropium for Nebulization. 3 milliLiter(s) Nebulizer once  aspirin  chewable 81 milliGRAM(s) Oral daily  chlorhexidine 0.12% Liquid 15 milliLiter(s) Oral Mucosa every 12 hours  dextrose 5% + sodium chloride 0.45%. 1000 milliLiter(s) (75 mL/Hr) IV Continuous <Continuous>  dextrose 5%. 1000 milliLiter(s) (100 mL/Hr) IV Continuous <Continuous>  dextrose 50% Injectable 25 Gram(s) IV Push once  dextrose 50% Injectable 12.5 Gram(s) IV Push once  dextrose 50% Injectable 25 Gram(s) IV Push once  glucagon  Injectable 1 milliGRAM(s) IntraMuscular once  heparin   Injectable 5000 Unit(s) SubCutaneous every 12 hours  insulin lispro (ADMELOG) corrective regimen sliding scale   SubCutaneous every 6 hours  ipratropium 17 MICROgram(s) HFA Inhaler 1 Puff(s) Inhalation every 6 hours  methylPREDNISolone sodium succinate Injectable 40 milliGRAM(s) IV Push daily  pantoprazole  Injectable 40 milliGRAM(s) IV Push daily  piperacillin/tazobactam IVPB.. 3.375 Gram(s) IV Intermittent every 8 hours    MEDICATIONS  (PRN):  dextrose Oral Gel 15 Gram(s) Oral once PRN Blood Glucose LESS THAN 70 milliGRAM(s)/deciliter  fentaNYL    Injectable 100 MICROGram(s) IV Push every 4 hours PRN MV synchr      Diet, NPO with Tube Feed:   Tube Feeding Modality: Nasogastric  Glucerna 1.5 Joshua  Total Volume for 24 Hours (mL): 960  Continuous  Starting Tube Feed Rate mL per Hour: 20  Increase Tube Feed Rate by (mL): 10     Every 4 hours  Until Goal Tube Feed Rate (mL per Hour): 40  Tube Feed Duration (in Hours): 24  Tube Feed Start Time: 13:00 (12-25-22 @ 12:01) [Active]          Vital Signs Last 24 Hrs  T(C): 36.6 (26 Dec 2022 04:06), Max: 37.2 (25 Dec 2022 16:14)  T(F): 97.8 (26 Dec 2022 04:06), Max: 99 (25 Dec 2022 16:14)  HR: 91 (26 Dec 2022 07:00) (78 - 94)  BP: 112/83 (26 Dec 2022 07:00) (80/61 - 112/83)  BP(mean): 93 (26 Dec 2022 07:00) (69 - 94)  RR: 20 (26 Dec 2022 07:00) (14 - 23)  SpO2: 99% (26 Dec 2022 07:00) (97% - 99%)    Parameters below as of 26 Dec 2022 07:00  Patient On (Oxygen Delivery Method): ventilator    O2 Concentration (%): 35      12-25-22 @ 07:01  -  12-26-22 @ 07:00  --------------------------------------------------------  IN: 1455 mL / OUT: 800 mL / NET: 655 mL        Mode: AC/ CMV (Assist Control/ Continuous Mandatory Ventilation), RR (machine): 20, TV (machine): 400, FiO2: 35, PEEP: 5, ITime: 1, MAP: 10, PIP: 28      LABS:                        15.9   13.56 )-----------( 182      ( 26 Dec 2022 06:46 )             49.4     12-26    147<H>  |  107  |  119<H>  ----------------------------<  198<H>  4.5   |  28  |  4.58<H>    Ca    8.8      26 Dec 2022 06:46    TPro  6.9  /  Alb  2.9<L>  /  TBili  2.2<H>  /  DBili  x   /  AST  670<H>  /  ALT  1824<H>  /  AlkPhos  306<H>  12-26          ABG - ( 25 Dec 2022 06:19 )  pH, Arterial: 7.43  pH, Blood: x     /  pCO2: 44    /  pO2: 108   / HCO3: 29    / Base Excess: 4.9   /  SaO2: 98.8                WBC:  WBC Count: 13.56 K/uL (12-26 @ 06:46)  WBC Count: 20.28 K/uL (12-25 @ 06:27)  WBC Count: 21.77 K/uL (12-24 @ 07:03)  WBC Count: 19.23 K/uL (12-23 @ 20:37)  WBC Count: 9.66 K/uL (12-23 @ 10:16)      MICROBIOLOGY:  RECENT CULTURES:  12-24 ET Tube ET Tube XXXX   Numerous polymorphonuclear leukocytes per low power field  No Squamous epithelial cells per low power field  Rare Gram Positive Rods seen per oil power field   Rare Pseudomonas aeruginosa    12-23 .Blood Blood-Peripheral XXXX XXXX   No growth to date.    12-23 Clean Catch Clean Catch (Midstream) Klebsiella oxytoca /Raoutella ornithinolytica XXXX   >100,000 CFU/ml Klebsiella oxytoca/Raoultella ornithinolytica    12-23 .Blood Blood-Peripheral XXXX XXXX   No growth to date.                    Sodium:  Sodium, Serum: 147 mmol/L (12-26 @ 06:46)  Sodium, Serum: 148 mmol/L (12-25 @ 06:27)  Sodium, Serum: 147 mmol/L (12-24 @ 07:03)  Sodium, Serum: 146 mmol/L (12-23 @ 20:37)  Sodium, Serum: 142 mmol/L (12-23 @ 10:16)      4.58 mg/dL 12-26 @ 06:46  4.04 mg/dL 12-25 @ 06:27  3.19 mg/dL 12-24 @ 07:03  2.65 mg/dL 12-23 @ 20:37  1.70 mg/dL 12-23 @ 10:16      Hemoglobin:  Hemoglobin: 15.9 g/dL (12-26 @ 06:46)  Hemoglobin: 15.2 g/dL (12-25 @ 06:27)  Hemoglobin: 15.4 g/dL (12-24 @ 07:03)  Hemoglobin: 16.3 g/dL (12-23 @ 20:37)  Hemoglobin: 16.6 g/dL (12-23 @ 10:16)      Platelets: Platelet Count - Automated: 182 K/uL (12-26 @ 06:46)  Platelet Count - Automated: 176 K/uL (12-25 @ 06:27)  Platelet Count - Automated: 176 K/uL (12-24 @ 07:03)  Platelet Count - Automated: 195 K/uL (12-23 @ 20:37)  Platelet Count - Automated: 146 K/uL (12-23 @ 10:16)      LIVER FUNCTIONS - ( 26 Dec 2022 06:46 )  Alb: 2.9 g/dL / Pro: 6.9 g/dL / ALK PHOS: 306 U/L / ALT: 1824 U/L DA / AST: 670 U/L / GGT: x                 RADIOLOGY & ADDITIONAL STUDIES:      MICROBIOLOGY:  RECENT CULTURES:  12-24 ET Tube ET Tube XXXX   Numerous polymorphonuclear leukocytes per low power field  No Squamous epithelial cells per low power field  Rare Gram Positive Rods seen per oil power field   Rare Pseudomonas aeruginosa    12-23 .Blood Blood-Peripheral XXXX XXXX   No growth to date.    12-23 Clean Catch Clean Catch (Midstream) Klebsiella oxytoca /Raoutella ornithinolytica XXXX   >100,000 CFU/ml Klebsiella oxytoca/Raoultella ornithinolytica    12-23 .Blood Blood-Peripheral XXXX XXXX   No growth to date.

## 2022-12-26 NOTE — PROGRESS NOTE ADULT - SUBJECTIVE AND OBJECTIVE BOX
[facility], Division of Infectious Diseases  MARINA Mccray Y. Patel, S. Shah, G. [facility]  [phone]    Name: DOROTHY VOSS  Age: 62y  Gender: Male  MRN: [id number]    Interval History:  Patient seen and examined at bedside in SPCU  No acute overnight events. Afebrile  Continues to remain critically ill, intubated  Notes reviewed    Antibiotics:  piperacillin/tazobactam IVPB.. 3.375 Gram(s) IV Intermittent every 8 hours      Medications:  albuterol    90 MICROgram(s) HFA Inhaler 2 Puff(s) Inhalation every 6 hours  albuterol/ipratropium for Nebulization. 3 milliLiter(s) Nebulizer once  aspirin  chewable 81 milliGRAM(s) Oral daily  chlorhexidine 0.12% Liquid 15 milliLiter(s) Oral Mucosa every 12 hours  dextrose 5% + sodium chloride 0.45%. 1000 milliLiter(s) IV Continuous <Continuous>  dextrose 5%. 1000 milliLiter(s) IV Continuous <Continuous>  dextrose 50% Injectable 25 Gram(s) IV Push once  dextrose 50% Injectable 12.5 Gram(s) IV Push once  dextrose 50% Injectable 25 Gram(s) IV Push once  dextrose Oral Gel 15 Gram(s) Oral once PRN  fentaNYL    Injectable 100 MICROGram(s) IV Push every 4 hours PRN  glucagon  Injectable 1 milliGRAM(s) IntraMuscular once  heparin   Injectable 5000 Unit(s) SubCutaneous every 12 hours  insulin lispro (ADMELOG) corrective regimen sliding scale   SubCutaneous every 6 hours  ipratropium 17 MICROgram(s) HFA Inhaler 1 Puff(s) Inhalation every 6 hours  methylPREDNISolone sodium succinate Injectable 40 milliGRAM(s) IV Push daily  pantoprazole  Injectable 40 milliGRAM(s) IV Push daily  piperacillin/tazobactam IVPB.. 3.375 Gram(s) IV Intermittent every 8 hours      Review of Systems:  unable to obtain    Allergies: No Known Allergies    For details regarding the patient's past medical history, social history, family history, and other miscellaneous elements, please refer the initial infectious diseases consultation and/or the admitting history and physical examination for this admission.    Objective:  Vitals:   T(C): 36.7 (12-26-22 @ 15:35), Max: 37.2 (12-25-22 @ 16:14)  HR: 82 (12-26-22 @ 13:00) (79 - 92)  BP: 113/88 (12-26-22 @ 13:00) (90/63 - 113/88)  RR: 19 (12-26-22 @ 13:00) (14 - 23)  SpO2: 97% (12-26-22 @ 13:00) (97% - 100%)    Physical Examination:  General: elderly M, intubated  HEENT: NC/AT, ETT in place  Lungs: MV breath sounds  Chest: L CT in place  Heart: RRR  Abdomen: Soft.   Extremities: No cyanosis or clubbing. No edema.     Laboratory Studies:  CBC:                       15.9   13.56 )-----------( 182      ( 26 Dec 2022 06:46 )             49.4     CMP: 12-26    147<H>  |  107  |  119<H>  ----------------------------<  198<H>  4.5   |  28  |  4.58<H>    Ca    8.8      26 Dec 2022 06:46    TPro  6.9  /  Alb  2.9<L>  /  TBili  2.2<H>  /  DBili  x   /  AST  670<H>  /  ALT  1824<H>  /  AlkPhos  306<H>  12-26    LIVER FUNCTIONS - ( 26 Dec 2022 06:46 )  Alb: 2.9 g/dL / Pro: 6.9 g/dL / ALK PHOS: 306 U/L / ALT: 1824 U/L DA / AST: 670 U/L / GGT: x               Microbiology: reviewed    Culture - Sputum (collected 12-24-22 @ 17:43)  Source: ET Tube ET Tube  Gram Stain (12-25-22 @ 07:58):    Numerous polymorphonuclear leukocytes per low power field    No Squamous epithelial cells per low power field    Rare Gram Positive Rods seen per oil power field  Preliminary Report (12-25-22 @ 20:31):    Rare Pseudomonas aeruginosa    Culture - Blood (collected 12-23-22 @ 14:06)  Source: .Blood Blood-Peripheral  Preliminary Report (12-24-22 @ 19:01):    No growth to date.    Culture - Urine (collected 12-23-22 @ 11:16)  Source: Clean Catch Clean Catch (Midstream)  Final Report (12-26-22 @ 07:02):    >100,000 CFU/ml Klebsiella oxytoca/Raoultella ornithinolytica  Organism: Klebsiella oxytoca /Raoutella ornithinolytica (12-26-22 @ 07:02)  Organism: Klebsiella oxytoca /Raoutella ornithinolytica (12-26-22 @ 07:02)      -  Amikacin: S <=16      -  Amoxicillin/Clavulanic Acid: S <=8/4      -  Ampicillin: R 16 These ampicillin results predict results for amoxicillin      -  Ampicillin/Sulbactam: S <=4/2 Enterobacter, Klebsiella aerogenes, Citrobacter, and Serratia may develop resistance during prolonged therapy (3-4 days)      -  Aztreonam: S <=4      -  Cefazolin: S <=2      -  Cefepime: S <=2      -  Cefoxitin: S <=8      -  Ceftriaxone: S <=1 Enterobacter, Klebsiella aerogenes, Citrobacter, and Serratia may develop resistance during prolonged therapy      -  Ciprofloxacin: S <=0.25      -  Ertapenem: S <=0.5      -  Gentamicin: S <=2      -  Imipenem: S <=1      -  Levofloxacin: S <=0.5      -  Meropenem: S <=1      -  Nitrofurantoin: S <=32 Should not be used to treat pyelonephritis      -  Piperacillin/Tazobactam: S <=8      -  Tobramycin: S <=2      -  Trimethoprim/Sulfamethoxazole: S <=0.5/9.5      Method Type: BAY    Culture - Blood (collected 12-23-22 @ 10:17)  Source: .Blood Blood-Peripheral  Preliminary Report (12-24-22 @ 18:01):    No growth to date.          Radiology: reviewed

## 2022-12-26 NOTE — PROGRESS NOTE ADULT - ASSESSMENT
Assessment: Patient presented from [facility] with acute hypoxic respiratory failure requiring intubation. Course was complicated by a pneumothorax requiring chest tube placement and PEA arrest with achievement of ROSC. Patient has been stable from a cardiopulmonary perspective, however his mental status remains unresponsive to verbal or noxious cues without sedation on board. Patient continues to require mechanical ventilation and breathing support. Hemodynamically he remains stable without pressors.       Acute hypoxic respiratory failure   - Patient stable on mechanical ventilation   - ABG ordered to assess ventilation in the morning   - continue to titrate ventilatory parameters as clinically indicated  - SpO2 continuous and patient is saturating well     Cardiac arrest   - Patient remains in regular rate and rhythm with continuous telemetry monitoring   - Cardiac enzymes sent for am to assess for improvement   - hemodynamically stable without pressors at this time     Aspiration Pneumonia  - oxygenating well without clinical signs of shunting  - CXR reviewed and stable this morning   - continue zosyn   - vented     LT PTX s/p chest tube   - Chest tube remains in place  - no air leak noted  - lung re-expanded properly  - no re-expansion pulmonary edema noted   - serial CXRs     Transaminitis  - Resolving   - repeat CMP in am     DERRICK  - Bailey in place for strict I&O  - UOP approx 40-50ml / hr   - Uremia and creatinine worsening - IVF increased in rate to help clear uremia   - repeat labs in am   - CK ordered to assess for rhabdo component     Altered Mental status  - ammonia ordered for morning   - Brain stem reflexes still intact   - CTH did no reveal any intracranial pathology contributory to mental status   - repeat CTH as per neurology; would recommend MRI non con if cardiology deems the AICD to be MRI compatible     Nutrition  - Tube feeds started today  - finger sticks q6h   - Dextrose IVF discontinued     I have spent a total of 60 mins of nonconsecutive critical care time managing this patient with acute hypoxic respiratory failure, PEA arrest, pneumothorax, altered mental status, acute kidney injury and aspiration pneumonia.  This included review of relevant history, clinical examination, review of data and images, discussion of treatment with the multidisciplinary team and any consultants involved in this patient’s care as well as family discussion.     I affirm that this patient is critically ill and at high risk for sudden, fatal deterioration due to one or more of the above stated active issues. I managed/supervised life or organ support interventions that required frequent assessment. This time does not include bedside procedures that are documented separately.      Assessment: Patient presented from [facility] with acute hypoxic respiratory failure requiring intubation. Course was complicated by a pneumothorax requiring chest tube placement and PEA arrest with achievement of ROSC. Patient has been stable from a cardiopulmonary perspective, however his mental status remains unresponsive to verbal or noxious cues without sedation on board. Patient continues to require mechanical ventilation and breathing support. Hemodynamically he remains stable without pressors.       Acute hypoxic respiratory failure   - Patient stable on mechanical ventilation   - ABG ordered to assess ventilation in the morning   - continue to titrate ventilatory parameters as clinically indicated  - SpO2 continuous and patient is saturating well     Cardiac arrest   - Patient remains in regular rate and rhythm with continuous telemetry monitoring   - Cardiac enzymes sent for am to assess for improvement   - hemodynamically stable without pressors at this time     Aspiration Pneumonia  - oxygenating well without clinical signs of shunting  - CXR reviewed and stable this morning   - continue zosyn   - vented     LT PTX s/p chest tube   - Chest tube remains in place  - no air leak noted  - lung re-expanded properly  - no re-expansion pulmonary edema noted   - serial CXRs     Transaminitis  - Resolving   - repeat CMP in am     DERRICK  - Bailey in place for strict I&O  - UOP approx 40-50ml / hr   - Uremia and creatinine worsening - IVF increased in rate to help clear uremia and if there is a rhabdo component   - repeat labs in am   - CK ordered to assess for rhabdo component     Altered Mental status  - ammonia ordered for morning   - Brain stem reflexes still intact   - CTH did no reveal any intracranial pathology contributory to mental status   - repeat CTH as per neurology; would recommend MRI non con if cardiology deems the AICD to be MRI compatible     Nutrition  - Tube feeds started today - I switched to nepro in light of worsening creatinine   - finger sticks q6h   - Dextrose IVF discontinued     I have spent a total of 60 mins of nonconsecutive critical care time managing this patient with acute hypoxic respiratory failure, PEA arrest, pneumothorax, altered mental status, acute kidney injury and aspiration pneumonia.  This included review of relevant history, clinical examination, review of data and images, discussion of treatment with the multidisciplinary team and any consultants involved in this patient’s care as well as family discussion.     I affirm that this patient is critically ill and at high risk for sudden, fatal deterioration due to one or more of the above stated active issues. I managed/supervised life or organ support interventions that required frequent assessment. This time does not include bedside procedures that are documented separately.

## 2022-12-26 NOTE — PROGRESS NOTE ADULT - SUBJECTIVE AND OBJECTIVE BOX
Date/Time Patient Seen:  		  Referring MD:   Data Reviewed	       Patient is a 62y old  Male who presents with a chief complaint of resp failure (25 Dec 2022 15:22)      Subjective/HPI     PAST MEDICAL & SURGICAL HISTORY:  Heart failure, systolic    CAD (coronary artery disease)    Hypertension    Nonischemic cardiomyopathy    COPD, moderate    2019 novel coronavirus disease (COVID-19)    Substance abuse    HLD (hyperlipidemia)    Cardiac LV ejection fraction 10-20%    No significant past surgical history    AICD (automatic cardioverter/defibrillator) present    Cardiac LV ejection fraction 10-20%          Medication list         MEDICATIONS  (STANDING):  albuterol    90 MICROgram(s) HFA Inhaler 2 Puff(s) Inhalation every 6 hours  albuterol/ipratropium for Nebulization. 3 milliLiter(s) Nebulizer once  aspirin  chewable 81 milliGRAM(s) Oral daily  chlorhexidine 0.12% Liquid 15 milliLiter(s) Oral Mucosa every 12 hours  dextrose 5%. 1000 milliLiter(s) (50 mL/Hr) IV Continuous <Continuous>  dextrose 5%. 1000 milliLiter(s) (100 mL/Hr) IV Continuous <Continuous>  dextrose 50% Injectable 25 Gram(s) IV Push once  dextrose 50% Injectable 12.5 Gram(s) IV Push once  dextrose 50% Injectable 25 Gram(s) IV Push once  glucagon  Injectable 1 milliGRAM(s) IntraMuscular once  heparin   Injectable 5000 Unit(s) SubCutaneous every 12 hours  insulin lispro (ADMELOG) corrective regimen sliding scale   SubCutaneous every 6 hours  ipratropium 17 MICROgram(s) HFA Inhaler 1 Puff(s) Inhalation every 6 hours  methylPREDNISolone sodium succinate Injectable 40 milliGRAM(s) IV Push daily  pantoprazole  Injectable 40 milliGRAM(s) IV Push daily  piperacillin/tazobactam IVPB.. 3.375 Gram(s) IV Intermittent every 8 hours    MEDICATIONS  (PRN):  dextrose Oral Gel 15 Gram(s) Oral once PRN Blood Glucose LESS THAN 70 milliGRAM(s)/deciliter  fentaNYL    Injectable 100 MICROGram(s) IV Push every 4 hours PRN MV synchr         Vitals log        ICU Vital Signs Last 24 Hrs  T(C): 36.6 (26 Dec 2022 04:06), Max: 37.2 (25 Dec 2022 16:14)  T(F): 97.8 (26 Dec 2022 04:06), Max: 99 (25 Dec 2022 16:14)  HR: 87 (26 Dec 2022 05:45) (78 - 94)  BP: 100/73 (26 Dec 2022 05:20) (80/61 - 111/77)  BP(mean): 83 (26 Dec 2022 05:20) (69 - 94)  ABP: --  ABP(mean): --  RR: 20 (26 Dec 2022 05:20) (14 - 23)  SpO2: 98% (26 Dec 2022 05:45) (97% - 99%)    O2 Parameters below as of 26 Dec 2022 05:20  Patient On (Oxygen Delivery Method): ventilator    O2 Concentration (%): 35         Mode: AC/ CMV (Assist Control/ Continuous Mandatory Ventilation)  RR (machine): 20  TV (machine): 400  FiO2: 35  PEEP: 5  ITime: 1  MAP: 11  PIP: 27      Input and Output:  I&O's Detail    24 Dec 2022 07:01  -  25 Dec 2022 07:00  --------------------------------------------------------  IN:    IV PiggyBack: 200 mL  Total IN: 200 mL    OUT:    Chest Tube (mL): 0 mL    Indwelling Catheter - Urethral (mL): 700 mL  Total OUT: 700 mL    Total NET: -500 mL      25 Dec 2022 07:01  -  26 Dec 2022 06:10  --------------------------------------------------------  IN:    Enteral Tube Flush: 400 mL    Glucerna 1.5: 540 mL    IV PiggyBack: 450 mL  Total IN: 1390 mL    OUT:    Indwelling Catheter - Urethral (mL): 800 mL  Total OUT: 800 mL    Total NET: 590 mL          Lab Data                        15.2   20.28 )-----------( 176      ( 25 Dec 2022 06:27 )             47.2     12-25    148<H>  |  106  |  94<H>  ----------------------------<  137<H>  4.7   |  28  |  4.04<H>    Ca    8.8      25 Dec 2022 06:27  Phos  4.1     12-24  Mg     2.2     12-24    TPro  6.5  /  Alb  3.2<L>  /  TBili  2.3<H>  /  DBili  1.6<H>  /  AST  1766<H>  /  ALT  2405<H>  /  AlkPhos  312<H>  12-25    ABG - ( 25 Dec 2022 06:19 )  pH, Arterial: 7.43  pH, Blood: x     /  pCO2: 44    /  pO2: 108   / HCO3: 29    / Base Excess: 4.9   /  SaO2: 98.8                    Review of Systems	      Objective     Physical Examination  heart s1s2  lung dec BS  head nc        Pertinent Lab findings & Imaging      Leo:  NO   Adequate UO     I&O's Detail    24 Dec 2022 07:01  -  25 Dec 2022 07:00  --------------------------------------------------------  IN:    IV PiggyBack: 200 mL  Total IN: 200 mL    OUT:    Chest Tube (mL): 0 mL    Indwelling Catheter - Urethral (mL): 700 mL  Total OUT: 700 mL    Total NET: -500 mL      25 Dec 2022 07:01  -  26 Dec 2022 06:11  --------------------------------------------------------  IN:    Enteral Tube Flush: 400 mL    Glucerna 1.5: 540 mL    IV PiggyBack: 450 mL  Total IN: 1390 mL    OUT:    Indwelling Catheter - Urethral (mL): 800 mL  Total OUT: 800 mL    Total NET: 590 mL               Discussed with:     Cultures:	        Radiology

## 2022-12-26 NOTE — PROGRESS NOTE ADULT - SUBJECTIVE AND OBJECTIVE BOX
HPI:  62M CAD, Dilated cardiomyopathy, S/p AICD, Afib/flutter, h/o substance abuse, CKD baseline creat approx 1.4 last admitted to [facility] with MSSA bacteremia, and CHF.  Sent from [facility] SNF with acute hypoxic respiratory failure.  Was initially on BiPAP then became hypoxic.  Anesthesia intubated patient.  On post intubation Xray pneumothorax evident.  Patient with PEA arrest.  Chest tube placed by ED physician.  ROSC obtained.     Patient unable to give further history.     (23 Dec 2022 13:02)      Past Medical History, Family History, Social History:  PAST MEDICAL & SURGICAL HISTORY:  Heart failure, systolic      CAD (coronary artery disease)      Hypertension      Nonischemic cardiomyopathy      COPD, moderate      2019 novel coronavirus disease (COVID-19)      Substance abuse      HLD (hyperlipidemia)      Cardiac LV ejection fraction 10-20%      AICD (automatic cardioverter/defibrillator) present      Cardiac LV ejection fraction 10-20%          FAMILY HISTORY:  FH: lung cancer    Review of Systems:  Review of Systems is Limited due to patient's neurologic status.      Physical Exam:  Constitutional: NAD    Neuro  * Mental Status: Awake, intubated, non verbal, not following commands, no purposeful movement   * Cranial Nerves: Cnii-Cnxii grossly intact. PERRL, EOMI, no gaze deviation  * Motor: no motor movement noted   * Sensory: patient does not withdraw or grimace to noxious   * Reflexes: Not assessed  * Gait: Not assessed    Cardiovascular: Regular rate and rhythm.  Eyes: See neurologic examination with detailed examination of eyes.  ENT: No JVD, Trachea Midline, ETT in place   Respiratory: symmetric chest rise, non labored breathing   Gastrointestinal: Soft, nontender, nondistended.  Genitourinary: reed in place, urine straw colored    Musculoskeletal: No muscle wasting noted, No pretibial edema appreciated, no appreciable calf tenderness.  Skin:  CDI   Hematologic / Lymph / Immunologic: No bleeding from IV sites or wounds, No Hives or allergic type skin lesions      Vitals:  Vital Signs Last 24 Hrs  T(C): 36.7 (26 Dec 2022 21:14), Max: 36.7 (26 Dec 2022 08:30)  T(F): 98 (26 Dec 2022 21:14), Max: 98.1 (26 Dec 2022 12:31)  HR: 83 (26 Dec 2022 21:03) (79 - 97)  BP: 99/65 (26 Dec 2022 21:00) (90/67 - 128/89)  BP(mean): 77 (26 Dec 2022 21:00) (76 - 101)  RR: 12 (26 Dec 2022 21:00) (0 - 24)  SpO2: 98% (26 Dec 2022 21:03) (93% - 100%)    I&O:  I&O's Summary    25 Dec 2022 07:01  -  26 Dec 2022 07:00  --------------------------------------------------------  IN: 1455 mL / OUT: 800 mL / NET: 655 mL    26 Dec 2022 07:01  -  26 Dec 2022 22:43  --------------------------------------------------------  IN: 1920 mL / OUT: 500 mL / NET: 1420 mL        Labs & Radiology:                        15.9   13.56 )-----------( 182      ( 26 Dec 2022 06:46 )             49.4       12-26    147<H>  |  107  |  119<H>  ----------------------------<  198<H>  4.5   |  28  |  4.58<H>    Ca    8.8      26 Dec 2022 06:46    TPro  6.9  /  Alb  2.9<L>  /  TBili  2.2<H>  /  DBili  x   /  AST  670<H>  /  ALT  1824<H>  /  AlkPhos  306<H>  12-26      LIVER FUNCTIONS - ( 26 Dec 2022 06:46 )  Alb: 2.9 g/dL / Pro: 6.9 g/dL / ALK PHOS: 306 U/L / ALT: 1824 U/L DA / AST: 670 U/L / GGT: x           Culture - Sputum (collected 24 Dec 2022 17:43)  Source: ET Tube ET Tube  Gram Stain (25 Dec 2022 07:58):    Numerous polymorphonuclear leukocytes per low power field    No Squamous epithelial cells per low power field    Rare Gram Positive Rods seen per oil power field  Final Report (26 Dec 2022 16:49):    Rare Pseudomonas aeruginosa    Normal Respiratory Cathy absent  Organism: Pseudomonas aeruginosa (26 Dec 2022 16:49)  Organism: Pseudomonas aeruginosa (26 Dec 2022 16:49)        ABG - ( 25 Dec 2022 06:19 )  pH, Arterial: 7.43  pH, Blood: x     /  pCO2: 44    /  pO2: 108   / HCO3: 29    / Base Excess: 4.9   /  SaO2: 98.8        CAPILLARY BLOOD GLUCOSE  POCT Blood Glucose.: 155 mg/dL (26 Dec 2022 17:14)  POCT Blood Glucose.: 185 mg/dL (26 Dec 2022 12:34)  POCT Blood Glucose.: 152 mg/dL (26 Dec 2022 05:32)  POCT Blood Glucose.: 148 mg/dL (26 Dec 2022 00:10)    < from: CT Head No Cont (12.23.22 @ 21:18) >  ACC: [id number] EXAM:  CT BRAIN                          PROCEDURE DATE:  12/23/2022          INTERPRETATION:  CT HEAD    INDICATIONS: ro ic bleed, Admitting Dxs: J96.01 RESP DISTRESS, SCT    TECHNIQUE:  Serial axial images were obtained from the skull base to the vertex   without the use of intravenous contrast.    COMPARISON EXAMINATION: 11/23/2016    FINDINGS:  Brain parenchyma: Mild progression of bilateral periventricular and deep   white matter hypoattenuation. There is no mass effect or intracranial   hemorrhage.    Extra-axial compartments: No extra-axial fluid collections are present.   The ventricles and sulci are normal in size and configuration for   patient's stated age. The basal cisterns are patent. Craniocervical   junction and sella turcica are within normal limits.    Calvarium, paranasal sinuses, and orbits: The calvarium is intact.   Air-fluid levels present in the pneumatized portion of the right   pterygoid recess. The mastoid air cells are clear. The orbits are within   normal limits.    IMPRESSION:  No large territory acute infarct, intracranial hemorrhage, or mass effect.    Slight progression of chronic microangiopathic white matter changes as   compared to prior study from 2016.    --- End of Report ---             ROBBIE HOLDER MD; Attending Radiologist  This document has been electronically signed. Dec 24 2022  4:28AM    < end of copied text >        Medications:  MEDICATIONS  (STANDING):  albuterol    90 MICROgram(s) HFA Inhaler 2 Puff(s) Inhalation every 6 hours  albuterol/ipratropium for Nebulization. 3 milliLiter(s) Nebulizer once  aspirin  chewable 81 milliGRAM(s) Oral daily  chlorhexidine 0.12% Liquid 15 milliLiter(s) Oral Mucosa every 12 hours  dextrose 5%. 1000 milliLiter(s) (100 mL/Hr) IV Continuous <Continuous>  dextrose 50% Injectable 25 Gram(s) IV Push once  dextrose 50% Injectable 12.5 Gram(s) IV Push once  dextrose 50% Injectable 25 Gram(s) IV Push once  glucagon  Injectable 1 milliGRAM(s) IntraMuscular once  heparin   Injectable 5000 Unit(s) SubCutaneous every 12 hours  insulin lispro (ADMELOG) corrective regimen sliding scale   SubCutaneous every 6 hours  ipratropium 17 MICROgram(s) HFA Inhaler 1 Puff(s) Inhalation every 6 hours  lactated ringers. 1000 milliLiter(s) (100 mL/Hr) IV Continuous <Continuous>  methylPREDNISolone sodium succinate Injectable 40 milliGRAM(s) IV Push daily  pantoprazole  Injectable 40 milliGRAM(s) IV Push daily  piperacillin/tazobactam IVPB.. 3.375 Gram(s) IV Intermittent every 8 hours    MEDICATIONS  (PRN):  dextrose Oral Gel 15 Gram(s) Oral once PRN Blood Glucose LESS THAN 70 milliGRAM(s)/deciliter  fentaNYL    Injectable 100 MICROGram(s) IV Push every 4 hours PRN MV synchr

## 2022-12-26 NOTE — PROGRESS NOTE ADULT - ASSESSMENT
62M CAD, Dilated cardiomyopathy, S/p AICD, Afib/flutter, h/o substance abuse,  presented with acute hypoxic and hypercarbic respiratory failure associated with pneumothorax which lled to cardiac arrest       Acute respiratory failure secondary to tension pneumothorax with leading to cardiac arrest   - continue vent management  - continue chest tube to suction.  Discussed case with CT surgery - no need for other acute intervention at this time. Unable to do onsite evaluation, but if     minium outpt from chest tube  will order serial chest xray  contiue on zosyn, and solmuderol     hypernatremia  Na 149  will start on d5 1/2 NS 75 cc/hr         Cardiomyopathy  - current cardiac issues appear to be secondary to pulm issues at this time  - hold medications for now   - hold eliquis pending possible further procedures and hospital course    DM2  - not on meds at SNF  - FS monitoring with lispro coverage scale while critically ill    DERRICK on CKD  - Due to above, possible HF as well  - monitor creatinine,   will start on cautius hydraiton  trend cretine     Prophylactic measure  - DVT proph: heparin SQ for now  - GI proph: protonix    D/w Dr Buster EMANUEL, Dr Zapien

## 2022-12-26 NOTE — PROGRESS NOTE ADULT - ASSESSMENT
The patient is a 62 year old male with a history of CAD, chronic systolic heart failure s/p ICD, atrial flutter s/p DCCV, substance abuse, CKD who is admitted with respiratory failure in the setting of tension pneumothorax complicated by cardiac arrest.    12/26/22  Seen at [facility] ICU  Intubated, sleeping comfortably  WBC-13.56  BUN/Creat-119/4.58-worsening  Troponin-139.8 trending down  Bili-2.2  Alk phos-306  SGOT-670  SGPT-1824    Plan:  - ECG with sinus tachycardia and known LBBB  - Echo 2/22 with severely reduced LV (EF 10%) and RV function, mod MR, mod TR, mild pulm HTN  - Hold all heart failure medications for now (BB, Entresto, spironolactone, bumetanide)  - BP low normal  - Hold apixaban given DERRICK and likely will need additional procedures  - Continue aspirin 81 mg daily  - Chest tube in place  - IV antibiotics-zosyn, vancomycin  - Sedation  - Mechanical ventilation  - ICU care  - Being followed by Palliative care, Pulmonary, ID  - Overall very poor prognosis      35 minutes of critical care time spent with the patient and coordinating care with nursing, hospitalist, and consultants. Patient is critically ill requiring ICU care. The patient is high risk for deterioration and death.

## 2022-12-26 NOTE — CONSULT NOTE ADULT - SUBJECTIVE AND OBJECTIVE BOX
NEPHROLOGY CONSULTATION    CHIEF COMPLAINT:  DERRICK      HPI:  Admitted three days ago with respiratory failure that devolved into a PEA arrest.   Currently vented on low FiO2, BP soft but not on pressors.  BUN/Cr acutely rising with sluggish urine output c/w DERRICK.      ROS:  unable      PAST MEDICAL & SURGICAL HISTORY:  Heart failure, systolic      CAD (coronary artery disease)      Hypertension      Nonischemic cardiomyopathy      COPD, moderate      2019 novel coronavirus disease (COVID-19)      Substance abuse      HLD (hyperlipidemia)      Cardiac LV ejection fraction 10-20%      AICD (automatic cardioverter/defibrillator) present      Cardiac LV ejection fraction 10-20%          SOCIAL HISTORY:  NA    FAMILY HISTORY:  NA      MEDICATIONS  (STANDING):  albuterol    90 MICROgram(s) HFA Inhaler 2 Puff(s) Inhalation every 6 hours  albuterol/ipratropium for Nebulization. 3 milliLiter(s) Nebulizer once  aspirin  chewable 81 milliGRAM(s) Oral daily  chlorhexidine 0.12% Liquid 15 milliLiter(s) Oral Mucosa every 12 hours  dextrose 5% + sodium chloride 0.45%. 1000 milliLiter(s) (75 mL/Hr) IV Continuous <Continuous>  dextrose 5%. 1000 milliLiter(s) (100 mL/Hr) IV Continuous <Continuous>  dextrose 50% Injectable 25 Gram(s) IV Push once  dextrose 50% Injectable 12.5 Gram(s) IV Push once  dextrose 50% Injectable 25 Gram(s) IV Push once  glucagon  Injectable 1 milliGRAM(s) IntraMuscular once  heparin   Injectable 5000 Unit(s) SubCutaneous every 12 hours  insulin lispro (ADMELOG) corrective regimen sliding scale   SubCutaneous every 6 hours  ipratropium 17 MICROgram(s) HFA Inhaler 1 Puff(s) Inhalation every 6 hours  methylPREDNISolone sodium succinate Injectable 40 milliGRAM(s) IV Push daily  pantoprazole  Injectable 40 milliGRAM(s) IV Push daily  piperacillin/tazobactam IVPB.. 3.375 Gram(s) IV Intermittent every 8 hours      PHYSICAL EXAMINATION:  T(F): 98 (12-26-22 @ 15:35)  HR: 82 (12-26-22 @ 13:00)  BP: 113/88 (12-26-22 @ 13:00)  RR: 19 (12-26-22 @ 13:00)  SpO2: 97% (12-26-22 @ 13:00)  Unresponsive on  vent  PERRLA, pink conjunctivae, no ptosis  Good dentition, no pharyngeal erythema  Neck non tender, no mass, no thyromegaly or nodules  Normal respiratory effort, lungs clear to auscultation  Heart with RRR, no murmurs or rubs, no peripheral edema  Abdomen soft, no masses, no organomegaly  Skin no rashes, ulcers or lesions, normal turgor and temperature      LABS:                        15.9   13.56 )-----------( 182      ( 26 Dec 2022 06:46 )             49.4     12-26    147<H>  |  107  |  119<H>  ----------------------------<  198<H>  4.5   |  28  |  4.58<H>    Ca    8.8      26 Dec 2022 06:46    TPro  6.9  /  Alb  2.9<L>  /  TBili  2.2<H>  /  DBili  x   /  AST  670<H>  /  ALT  1824<H>  /  AlkPhos  306<H>  12-26        RADIOLOGY:  Chest X-Ray personally reviewed and shows severe cardiomegaly, small right effusion/infiltrate/atelectasis        ASSESSMENT:  1.  DERRICK due to ischemic ATN - oliguric?   2.  Acute respiratory failure and PEA arrest with ROSC  3.  Mild hypernatremia  4.  Status post large left sided tension pneumothorax    PLAN:  Low to no suspicion that he is prerenal so no good rationale to volume expand with sodium containing IVF  Favor D5W or enteral water flushes to mitigate hypernatremia, low rate approx 1 liter/day  Monitor for dialytic indications daily  Avoid new nephrotoxins and hypotension  Neuro prognosticate

## 2022-12-26 NOTE — CONSULT NOTE ADULT - SUBJECTIVE AND OBJECTIVE BOX
Patient is a 62y old  Male who presents with a chief complaint of resp failure (26 Dec 2022 09:47)    HPI: 62M CAD, Dilated cardiomyopathy, S/p AICD, Afib/flutter, h/o substance abuse, CKD baseline creat approx 1.4 last admitted to [facility] with MSSA bacteremia, and CHF.  Sent from [facility] SNF with acute hypoxic respiratory failure.  Was initially on BiPAP then became hypoxic.  Anesthesia intubated patient.  On post intubation Xray pneumothorax evident.  Patient with PEA arrest.  Chest tube placed by ED physician.  ROSC obtained.     Patient unable to give further history.        PAST MEDICAL & SURGICAL HISTORY:    Heart failure, systolic    CAD (coronary artery disease)    Hypertension    Nonischemic cardiomyopathy    COPD, moderate    2019 novel coronavirus disease (COVID-19)    Substance abuse    HLD (hyperlipidemia)    Cardiac LV ejection fraction 10-20%    AICD (automatic cardioverter/defibrillator) present    Cardiac LV ejection fraction 10-20%    MEDICATIONS  (STANDING):    albuterol    90 MICROgram(s) HFA Inhaler 2 Puff(s) Inhalation every 6 hours  albuterol/ipratropium for Nebulization. 3 milliLiter(s) Nebulizer once  aspirin  chewable 81 milliGRAM(s) Oral daily  chlorhexidine 0.12% Liquid 15 milliLiter(s) Oral Mucosa every 12 hours  dextrose 5% + sodium chloride 0.45%. 1000 milliLiter(s) (75 mL/Hr) IV Continuous <Continuous>  dextrose 5%. 1000 milliLiter(s) (100 mL/Hr) IV Continuous <Continuous>  dextrose 50% Injectable 25 Gram(s) IV Push once  dextrose 50% Injectable 12.5 Gram(s) IV Push once  dextrose 50% Injectable 25 Gram(s) IV Push once  glucagon  Injectable 1 milliGRAM(s) IntraMuscular once  heparin   Injectable 5000 Unit(s) SubCutaneous every 12 hours  insulin lispro (ADMELOG) corrective regimen sliding scale   SubCutaneous every 6 hours  ipratropium 17 MICROgram(s) HFA Inhaler 1 Puff(s) Inhalation every 6 hours  methylPREDNISolone sodium succinate Injectable 40 milliGRAM(s) IV Push daily  pantoprazole  Injectable 40 milliGRAM(s) IV Push daily  piperacillin/tazobactam IVPB.. 3.375 Gram(s) IV Intermittent every 8 hours    MEDICATIONS  (PRN):    dextrose Oral Gel 15 Gram(s) Oral once PRN Blood Glucose LESS THAN 70 milliGRAM(s)/deciliter  fentaNYL    Injectable 100 MICROGram(s) IV Push every 4 hours PRN MV synchr    Allergies    No Known Allergies    Intolerances    pork (Other)    SOCIAL HISTORY:    H/o substance abuse    FAMILY HISTORY:  FH: lung cancer    REVIEW OF SYSTEMS: UTO    PHYSICAL EXAM:  Vital Signs Last 24 Hrs  T(F): 98.1 (12-26-22 @ 12:31)  HR: 82 (12-26-22 @ 13:00)  BP: 113/88 (12-26-22 @ 13:00)  RR: 19 (12-26-22 @ 13:00)    On Neurological Examination:    Intubated on vent.  Not on any sedation.  Doesn't respond respond to pain.  Pupils are 3 mm, sluggishly reacting to light.  Corneal reflexes are positive.  Gag reflex is positive  Neck is supple.  No abn body movements.  Breathing over the vent. Vent set rate is 20, breathing at 21     LABS:                        15.9   13.56 )-----------( 182      ( 26 Dec 2022 06:46 )             49.4     12-26    147<H>  |  107  |  119<H>  ----------------------------<  198<H>  4.5   |  28  |  4.58<H>    Ca    8.8      26 Dec 2022 06:46    TPro  6.9  /  Alb  2.9<L>  /  TBili  2.2<H>  /  DBili  x   /  AST  670<H>  /  ALT  1824<H>  /  AlkPhos  306<H>  12-26      RADIOLOGY & ADDITIONAL STUDIES:    < from: CT Head No Cont (12.23.22 @ 21:18) >    IMPRESSION:    No large territory acute infarct, intracranial hemorrhage, or mass effect.    Slight progression of chronic microangiopathic white matter changes as compared to prior study from 2016.    < end of copied text >    < from: CT Chest No Cont (12.23.22 @ 21:19) >    IMPRESSION:    *  Left chest tube with residual small left pneumothorax. No evidence of tension.  *  Right basilar rounded atelectasis again noted with chronic small loculated right pleural effusion.  *  Small pneumomediastinum. Left chest and abdominal wall emphysema. Moderate pneumoretroperitoneum and properitoneal air. No pneumoperitoneum. Findings are compatible with barotrauma.  *  Evidence of urinary bladder cystitis.    < end of copied text >

## 2022-12-27 NOTE — PROGRESS NOTE ADULT - ASSESSMENT
62M CAD, Dilated cardiomyopathy, S/p AICD, Afib/flutter, h/o substance abuse,  presented with acute hypoxic and hypercarbic respiratory failure associated with pneumothorax.      ptx  resp failure  arnoldo hx  CM  AICD  AF    s/p ptx  s/p intubation  lung protective vent support  spoke with friend -  Meal - no HCP known - no family as per friend  plan for CT   vs noted  labs reviewed    renal - neuro eval noted  labs reviewed -   vs noted    oral and skin care  I and O  monitor VS and HD  CT in place -   emp ABX  COPD management  SW eval and follow up  assist with needs  monitor for needs - pain - sx -   SPCU care

## 2022-12-27 NOTE — PROGRESS NOTE ADULT - ASSESSMENT
The patient is a 62 year old male with a history of CAD, chronic systolic heart failure s/p ICD, atrial flutter s/p DCCV, substance abuse, CKD who is admitted with respiratory failure in the setting of tension pneumothorax complicated by cardiac arrest.    12/27/22  Seen at [facility] ICU  Intubated, sleeping comfortably  WBC-14.34  BUN/Creat-124/4.20  Troponin-74.4 trending down  Bili-2.1  Alk phos-287  SGOT-212  SGPT-1041    Plan:  - ECG with sinus tachycardia and known LBBB  - Echo 2/22 with severely reduced LV (EF 10%) and RV function, mod MR, mod TR, mild pulm HTN  - Hold all heart failure medications for now (BB, Entresto, spironolactone, bumetanide)  - BP low normal  - Hold apixaban given DERRICK and likely will need additional procedures  - Continue aspirin 81 mg daily  - Chest tube in place  - IV antibiotics-zosyn, vancomycin  - Sedation  - Mechanical ventilation  - ICU care  - Being followed by Palliative care, Pulmonary, ID  - Overall very poor prognosis      35 minutes of critical care time spent with the patient and coordinating care with nursing, hospitalist, and consultants. Patient is critically ill requiring ICU care. The patient is high risk for deterioration and death.

## 2022-12-27 NOTE — PROGRESS NOTE ADULT - ASSESSMENT
REVIEW OF SYMPTOMS      Able to give (reliable) ROS  NO     PHYSICAL EXAM    HEENT Unremarkable  atraumatic   RESP Fair air entry EXP prolonged    Harsh breath sound Resp distres mild   CARDIAC S1 S2 No S3     NO JVD    ABDOMEN SOFT BS PRESENT NOT DISTENDED No hepatosplenomegaly   PEDAL EDEMA present No calf tenderness  NO rash       GENERAL DATA .   GOC.   12/23/2022 full code  ALLGY.     nka                  WT.     12/24/2022 81           BMI.       12/24/2022 26          ICU STAY. .. 12/23/2022  COVID. ..  12/23/2022 scv2 (-)   BEST PRACTICE ISSUES.    HOB ELEVATN. Yes  DVT PPLX. ..    12/23/2022 hpsc   WHITMORE PPLX. ..    12/23/2022 protonix 40   INFN PPLX. ..  12/23/2022 chlorhexidine .12%   SP SW VIVIAN.         DIET.  ..  12/25/2022 glucerna 960 ng   IV fl... 12/26/2022 d5 50      PROCEDURES...   12/23/2022  l chest tube   12/23/2022 intubated     ABGS.  12/25/2022 ac 40% 743/44/108   12/24/2022 ac 60% 746/41/190   12/23/2022 11 a 100% 705/95/68     VS/ PO/IO/ VENT/ DRIPS.   12/27/2022 afeb 88 110/80   12/27/2022 ac 20/400/5/.35     PREV ADMISSION 2/11-2/25/2022 [facility]    MAIN ISSUES.  62 m doa 12/23/2022 resp distress  PMH COPD  PMH COVID (+) 2/11/2022   PMH S aureus bacteremia mssa 2/11   .. Ancef 2/12/2022 Dr Phan  PM AICD  PMH HFREF  .. ECHO 11/20/2021 ef 20%  PMH A fib (on eliquis)     CAC 12/23/2022 rosc 25 min   Intubation 12/23/2022  Vent mgmt 12/23/2022   sedation.  Pneumothorax... 12/23/2022 cxr tension pntx  Infection.  Possible aspn pneum 12/23/2022  Possible uti 12/23/2022   .. ua 12/23/2022 w 26-50   .. mrsa 12/23 (+)   .. 12/24 et pseudo  .. 12/23 uc klebsiella   .. 12/23 zosyn   Lacticemia.  elevated lfts.  DERRICK.  Hypernatremia.  Anoxic encephalo    HOME MEDS INCLUDE.  apixaba 5.2   sacubitril valsartan 24 26    bumetanide 2   spironolactone 25  protonix 40   amiodarone 200     PROBLEMS ASSESSMENT RECOMMENDATIONS.  Intubation 12/23/2022  Vent mgmnt.   .. bundle dsv dsbt ltvv pplat 30 (-) PO2 60 (+) ph 7.3 (+)  sedation.  .. 12/23/2022 fentanyl 100.4p   .. 12/26/2022 is not requiring sedation (anoxic encephalo)   COPD.  .. 12/23/2022 combivent .4     .. 12/23/2022 solumed 40.3 -> 12/24/2022 solumed 40   .. 12/23/2022 will taper to 40 on 12/24   .. cont rx  Code 12/23/2022    .. 12/23/2022 was  restless when he came in No defib  .. 12/23/2022 coded at 12.13 p pea arrest about 25 min rosc   .. 12/23/2022 monitor neuro recovery  .. 12/24/2022 eyes open gag (+) does not follow commands   Pneumothorax.  .. 12/23/2022 cxr tension pntx  .. 12/23/2022 chets tube inserted  .. cxr 12/24/2022 et tube ng tube cardiac device cm sm r effs and basal infiltr l chest tube npo pntx sc emphysema improvd   .. 12/24/2022 bleeding at chest tube insertion site and scant drainage   .. 12/24/2022 above chest tube issues dw Dr Borges and she felkt bleeding likely sec recent apixaba and no drainage likely sec to resolution of pntx  and advised observation   .. follow clinically and with imaging   .. 12/24/2022 plan is to wean off vent and then plan chest tube remoival   .. 12/26/2022 pt is unresponsive may not be weanable and may need trach peg and placement   Infection.   Possible aspn pneum 12/23/2022  Possible uti 12/23/2022   .. W 12/23- 12/24-12/25-12/26-12/27/2022   w 9.6 - 21 - 20- 13 - 14   .. pr 12/24-12/25/2022 pr 31- 24     .. ua 12/23/2022 w 26-50   .. ct cap 12/23 l chest tube sm l pntx chr small loculated r pl effs mod retropneumoperitonuem cw barotrauma   .. flu ab 12/23/2022 (-)  .. rsv 12/23/2022 (-)   .. bc 12/23 (-)   .. mrsa 12/23 (+)   .. 12/24 et pseudo  .. 12/23 uc klebsiella   .. 12/23 zosyn   .. 12/25/2022 Vanco 1 dose given by id   .. bsab follow cult   .. 12/24/2022 dw Dr Frazier To add vanco   Lacticemia.  .. la 12/23-12/24/2022 la 3.2  - 1.9   .. Fluids and monitor  CAD.  .. Tr 12/24-12/25/2022 Tr 259 -139  .. 12/23/2022 asa 81   .. monitor   CHF.  .. bnp 12/25-12/27/2022 70k - 51k   .. ho chf   .. chf meds to be reintroduced by cardio as allowed by bp   elevated lfts.  .. LFTS 12/23-12/24-12/25-12/26-12/27/2022       - 390-312- 306- 287     - 2524 - 1766- 670- 212      - 2066 - 2405 - 1824- 1041  .. 12/23/2022  ct cap pneumoretroperitoneum cw barotrauma   .. 12/23/2022 check hep profile   .. 12/23/2022 follow serially  .. elevcated lfts likely sec to anoxic hepatopathy during cac   DERRICK.  .. Cr 12/23-12/24-12/25-12/26-12/27/2022   Cr 1.7- 3.1- 4 - 4.5 - 4.2  .. uo 12/25-12/26/2022 400  - 500    .. fluids and serial monitoring  .. 12/25/2022 renal calld   .. 12/26/2022 seen Dr Escalante feels derrick sec ATN recommended d5w   Hypernatremia.  .. Na 12/24-12/25-12/26/2022 Na 147 - 148 - 147  .. 12/26/2022 iv changed to d5 50    AMS.  .. 12/25/2022 remains unresponsive   .. 12/25/2022 Nurse tells me that no sedation is being required   .. 12/23/2022 ct head(-)   .. 12/25/2022 neuro consultd   .. 12/26/2022 pt seen by Dr Hyatt To repeat ct in 72h    TIME SPENT   Over 39 minutes aggregate critical care time spent on encounter; activities included   direct patient care, counseling and/or coordinating care reviewing notes, lab data/ imaging , discussion with multidisciplinary team/ patient  /family and explaining in detail risks, benefits, alternatives  of the recommendations     BIPIN DE LA CRUZ 61 m 12/23/2022 1962 DR KELVIN VANESSA

## 2022-12-27 NOTE — PROGRESS NOTE ADULT - ASSESSMENT
62M CAD, Dilated cardiomyopathy, S/p AICD, Afib/flutter, h/o substance abuse,  presented with acute hypoxic and hypercarbic respiratory failure associated with pneumothorax which lled to cardiac arrest       Acute respiratory failure secondary to tension pneumothorax with leading to cardiac arrest   - continue vent management  - continue chest tube to suction.  Discussed case with CT surgery - no need for other acute intervention at this time. Unable to do onsite evaluation, but if     minium outpt from chest tube  will order serial chest xray  which shows resolving of pneumo  chest tube removal   contiue on zosyn,   no wheezing noted  will order to taper predisone     hypernatremia  Na 149,   started on  d5 1/2 NS 75 cc/hr   NA improved to 147         Cardiomyopathy  - current cardiac issues appear to be secondary to pulm issues at this time  - hold medications for now   - hold eliquis pending possible further procedures and hospital course    DM2  - not on meds at SNF  - FS monitoring with lispro coverage scale while critically ill    DERRICK on CKD  - Due to above, possible HF as well  - monitor creatinine,   will start on cautius hydraiton  trend cretine     Prophylactic measure  - DVT proph: heparin SQ for now  - GI proph: protonix    D/w Dr Buster EMANUEL, Dr Zapien

## 2022-12-27 NOTE — PROGRESS NOTE ADULT - SUBJECTIVE AND OBJECTIVE BOX
[facility]    Allergies    No Known Allergies    Intolerances    pork (Other)      PAST MEDICAL & SURGICAL HISTORY:  Heart failure, systolic      CAD (coronary artery disease)      Hypertension      Nonischemic cardiomyopathy      COPD, moderate      2019 novel coronavirus disease (COVID-19)      Substance abuse      HLD (hyperlipidemia)      Cardiac LV ejection fraction 10-20%      AICD (automatic cardioverter/defibrillator) present      Cardiac LV ejection fraction 10-20%          FAMILY HISTORY:  FH: lung cancer        Home Medications:  acetaminophen 325 mg oral tablet: 2 tab(s) orally every 6 hours, As needed, Temp greater or equal to 38C (100.4F), Mild Pain (1 - 3) (23 Dec 2022 10:07)  apixaban 5 mg oral tablet: 1 tab(s) orally every 12 hours (23 Dec 2022 10:07)  Aquaphor Healing topical ointment: Apply topically to affected area once a day (23 Dec 2022 10:07)  ascorbic acid 500 mg oral tablet: 1 tab(s) orally once a day (23 Dec 2022 10:07)  Bacid (LAC) oral capsule: 2 cap(s) orally once a day (23 Dec 2022 10:07)  bumetanide 2 mg oral tablet: 1 tab(s) orally 2 times a day (23 Dec 2022 10:07)  gabapentin 100 mg oral capsule: 1 cap(s) orally 3 times a day (23 Dec 2022 10:07)  melatonin 3 mg oral tablet: 1 tab(s) orally once a day (at bedtime), As needed, Insomnia (23 Dec 2022 10:07)  Multiple Vitamins with Minerals oral tablet: 1 tab(s) orally once a day (23 Dec 2022 10:07)  pantoprazole 40 mg oral delayed release tablet: 1 tab(s) orally once a day (before a meal) (23 Dec 2022 10:07)  sacubitril-valsartan 24 mg-26 mg oral tablet: 1 tab(s) orally 2 times a day (23 Dec 2022 10:07)  simethicone 80 mg oral tablet: 2 tab(s) orally every 6 hours, As Needed (23 Dec 2022 10:07)  spironolactone 25 mg oral tablet: 1 tab(s) orally once a day (23 Dec 2022 10:07)  Symbicort 160 mcg-4.5 mcg/inh inhalation aerosol: 2 puff(s) inhaled 2 times a day (23 Dec 2022 10:07)  Ventolin HFA 90 mcg/inh inhalation aerosol: 2 puff(s) inhaled every 6 hours, As Needed (23 Dec 2022 10:07)      MEDICATIONS  (STANDING):  albuterol    90 MICROgram(s) HFA Inhaler 2 Puff(s) Inhalation every 6 hours  albuterol/ipratropium for Nebulization. 3 milliLiter(s) Nebulizer once  aspirin  chewable 81 milliGRAM(s) Oral daily  chlorhexidine 0.12% Liquid 15 milliLiter(s) Oral Mucosa every 12 hours  dextrose 5%. 1000 milliLiter(s) (100 mL/Hr) IV Continuous <Continuous>  dextrose 50% Injectable 25 Gram(s) IV Push once  dextrose 50% Injectable 12.5 Gram(s) IV Push once  dextrose 50% Injectable 25 Gram(s) IV Push once  glucagon  Injectable 1 milliGRAM(s) IntraMuscular once  heparin   Injectable 5000 Unit(s) SubCutaneous every 12 hours  insulin lispro (ADMELOG) corrective regimen sliding scale   SubCutaneous every 6 hours  ipratropium 17 MICROgram(s) HFA Inhaler 1 Puff(s) Inhalation every 6 hours  lactated ringers. 1000 milliLiter(s) (100 mL/Hr) IV Continuous <Continuous>  pantoprazole  Injectable 40 milliGRAM(s) IV Push daily  piperacillin/tazobactam IVPB.. 3.375 Gram(s) IV Intermittent every 8 hours    MEDICATIONS  (PRN):  dextrose Oral Gel 15 Gram(s) Oral once PRN Blood Glucose LESS THAN 70 milliGRAM(s)/deciliter  fentaNYL    Injectable 100 MICROGram(s) IV Push every 4 hours PRN MV synchr      Diet, NPO with Tube Feed:   Tube Feeding Modality: Nasogastric  Nepro with Carb Steady  Total Volume for 24 Hours (mL): 960  Continuous  Starting Tube Feed Rate mL per Hour: 20  Increase Tube Feed Rate by (mL): 10     Every 4 hours  Until Goal Tube Feed Rate (mL per Hour): 40  Tube Feed Duration (in Hours): 24  Tube Feed Start Time: 13:00  Free Water Flush  Pump   Rate (mL per Hour): 30 (12-26-22 @ 23:12) [Active]          Vital Signs Last 24 Hrs  T(C): 36.5 (27 Dec 2022 04:06), Max: 36.7 (26 Dec 2022 08:30)  T(F): 97.7 (27 Dec 2022 04:06), Max: 98.1 (26 Dec 2022 12:31)  HR: 88 (27 Dec 2022 08:01) (79 - 97)  BP: 122/89 (27 Dec 2022 06:00) (93/71 - 128/89)  BP(mean): 100 (27 Dec 2022 06:00) (77 - 101)  RR: 17 (27 Dec 2022 06:00) (0 - 24)  SpO2: 99% (27 Dec 2022 08:01) (93% - 100%)    Parameters below as of 27 Dec 2022 07:54  Patient On (Oxygen Delivery Method): ventilator,35          12-26-22 @ 07:01  -  12-27-22 @ 07:00  --------------------------------------------------------  IN: 3380 mL / OUT: 1000 mL / NET: 2380 mL        Mode: AC/ CMV (Assist Control/ Continuous Mandatory Ventilation), RR (machine): 20, TV (machine): 400, FiO2: 35, PEEP: 5, ITime: 1, MAP: 11, PIP: 36      LABS:                        16.4   14.34 )-----------( 157      ( 27 Dec 2022 06:36 )             50.6     12-27    147<H>  |  109<H>  |  124<H>  ----------------------------<  198<H>  4.7   |  26  |  4.20<H>    Ca    8.7      27 Dec 2022 06:36  Phos  4.4     12-27  Mg     5.8     12-27    TPro  6.5  /  Alb  2.7<L>  /  TBili  2.1<H>  /  DBili  x   /  AST  212<H>  /  ALT  1041<H>  /  AlkPhos  287<H>  12-27              WBC:  WBC Count: 14.34 K/uL (12-27 @ 06:36)  WBC Count: 13.56 K/uL (12-26 @ 06:46)  WBC Count: 20.28 K/uL (12-25 @ 06:27)  WBC Count: 21.77 K/uL (12-24 @ 07:03)  WBC Count: 19.23 K/uL (12-23 @ 20:37)  WBC Count: 9.66 K/uL (12-23 @ 10:16)      MICROBIOLOGY:  RECENT CULTURES:  12-24 ET Tube ET Tube Pseudomonas aeruginosa   Numerous polymorphonuclear leukocytes per low power field  No Squamous epithelial cells per low power field  Rare Gram Positive Rods seen per oil power field   Rare Pseudomonas aeruginosa  Normal Respiratory Cathy absent    12-23 .Blood Blood-Peripheral XXXX XXXX   No growth to date.    12-23 Clean Catch Clean Catch (Midstream) Klebsiella oxytoca /Raoutella ornithinolytica XXXX   >100,000 CFU/ml Klebsiella oxytoca/Raoultella ornithinolytica    12-23 .Blood Blood-Peripheral XXXX XXXX   No growth to date.          CARDIAC MARKERS ( 27 Dec 2022 06:36 )  x     / x     / 40 U/L / x     / 0.5 ng/mL            Sodium:  Sodium, Serum: 147 mmol/L (12-27 @ 06:36)  Sodium, Serum: 147 mmol/L (12-26 @ 06:46)  Sodium, Serum: 148 mmol/L (12-25 @ 06:27)  Sodium, Serum: 147 mmol/L (12-24 @ 07:03)  Sodium, Serum: 146 mmol/L (12-23 @ 20:37)  Sodium, Serum: 142 mmol/L (12-23 @ 10:16)      4.20 mg/dL 12-27 @ 06:36  4.58 mg/dL 12-26 @ 06:46  4.04 mg/dL 12-25 @ 06:27  3.19 mg/dL 12-24 @ 07:03  2.65 mg/dL 12-23 @ 20:37  1.70 mg/dL 12-23 @ 10:16      Hemoglobin:  Hemoglobin: 16.4 g/dL (12-27 @ 06:36)  Hemoglobin: 15.9 g/dL (12-26 @ 06:46)  Hemoglobin: 15.2 g/dL (12-25 @ 06:27)  Hemoglobin: 15.4 g/dL (12-24 @ 07:03)  Hemoglobin: 16.3 g/dL (12-23 @ 20:37)  Hemoglobin: 16.6 g/dL (12-23 @ 10:16)      Platelets: Platelet Count - Automated: 157 K/uL (12-27 @ 06:36)  Platelet Count - Automated: 182 K/uL (12-26 @ 06:46)  Platelet Count - Automated: 176 K/uL (12-25 @ 06:27)  Platelet Count - Automated: 176 K/uL (12-24 @ 07:03)  Platelet Count - Automated: 195 K/uL (12-23 @ 20:37)  Platelet Count - Automated: 146 K/uL (12-23 @ 10:16)      LIVER FUNCTIONS - ( 27 Dec 2022 06:36 )  Alb: 2.7 g/dL / Pro: 6.5 g/dL / ALK PHOS: 287 U/L / ALT: 1041 U/L DA / AST: 212 U/L / GGT: x                 RADIOLOGY & ADDITIONAL STUDIES:      MICROBIOLOGY:  RECENT CULTURES:  12-24 ET Tube ET Tube Pseudomonas aeruginosa   Numerous polymorphonuclear leukocytes per low power field  No Squamous epithelial cells per low power field  Rare Gram Positive Rods seen per oil power field   Rare Pseudomonas aeruginosa  Normal Respiratory Cathy absent    12-23 .Blood Blood-Peripheral XXXX XXXX   No growth to date.    12-23 Clean Catch Clean Catch (Midstream) Klebsiella oxytoca /Raoutella ornithinolytica XXXX   >100,000 CFU/ml Klebsiella oxytoca/Raoultella ornithinolytica    12-23 .Blood Blood-Peripheral XXXX XXXX   No growth to date.

## 2022-12-27 NOTE — PROGRESS NOTE ADULT - ASSESSMENT
63 yo m pmxh HTN, Dilated cardiomyopathy, afib/flutter s/p AICD, CHD (baseline Cr ~1.4), former substance abuse, recent admission to [facility] with MSSA bacteremia and CHF exacerbation presented with acute hypoxic respiratory failure railed NIPPV ultimately requiring intubation, post intubation cxr with left sided ptx with deterioration into PEA arrest s/p left ct placement by ED physician with course complicated by aspiration, shock state, shock liver, AK on CKD and metabolic encephalopathy.     NEURO: Metabolic/Anoxic Encephalopathy, lactulose for elevated ammonia.  repeat head ct on 12/29 per neurology.    CV: HD monitoring  RESP: Hypoxic respiratory failure, ac/vc 4-6 cc/kg tv lung protective strategies, moderate amount of secretions appreciated, chest pt, pulm lavage/suctioning.  inh bronchodilators prn  RENAL: DERRICK on CKD, avoid nephrotoxic meds, renally dose meds, trend urine output, bun/cr and electrolytes.  replace lytes as needed. adequate urine output adequate, reed in place, nephrology following  GI: NPO with tf  ENDO: D5W changed to 1/2 NS for hypernatremia 2/2 hyperglycemia.   ID: [email or web address] for coverage  HEME: Heparin for vte ppx   DISPO: Full code.      Critical Care time: 45 mins assessing presenting problems of acute illness that poses high probability of life threatening deterioration or end organ damage/dysfunction.  Medical decision making including Initiating plan of care, reviewing data, reviewing radiology, discussing with multidisciplinary team, non inclusive of procedures, discussing goals of care with patient/family

## 2022-12-27 NOTE — PROGRESS NOTE ADULT - SUBJECTIVE AND OBJECTIVE BOX
Patient is a 62y Male with a known history of :    HPI:  62M CAD, Dilated cardiomyopathy, S/p AICD, Afib/flutter, h/o substance abuse, CKD baseline creat approx 1.4 last admitted to [facility] with MSSA bacteremia, and CHF.  Sent from [facility] with acute hypoxic respiratory failure.  Was initially on BiPAP then became hypoxic.  Anesthesia intubated patient.  On post intubation Xray pneumothorax evident.  Patient with PEA arrest.  Chest tube placed by ED physician.  ROSC obtained.     Patient unable to give further history.     (23 Dec 2022 13:02)      REVIEW OF SYSTEMS:    CONSTITUTIONAL: No fever, weight loss, or fatigue  EYES: No eye pain, visual disturbances, or discharge  ENMT:  No difficulty hearing, tinnitus, vertigo; No sinus or throat pain  NECK: No pain or stiffness  BREASTS: No pain, masses, or nipple discharge  RESPIRATORY: No cough, wheezing, chills or hemoptysis; No shortness of breath  CARDIOVASCULAR: No chest pain, palpitations, dizziness, or leg swelling  GASTROINTESTINAL: No abdominal or epigastric pain. No nausea, vomiting, or hematemesis; No diarrhea or constipation. No melena or hematochezia.  GENITOURINARY: No dysuria, frequency, hematuria, or incontinence  NEUROLOGICAL: No headaches, memory loss, loss of strength, numbness, or tremors  SKIN: No itching, burning, rashes, or lesions   LYMPH NODES: No enlarged glands  ENDOCRINE: No heat or cold intolerance; No hair loss  MUSCULOSKELETAL: No joint pain or swelling; No muscle, back, or extremity pain  PSYCHIATRIC: No depression, anxiety, mood swings, or difficulty sleeping  HEME/LYMPH: No easy bruising, or bleeding gums  ALLERGY AND IMMUNOLOGIC: No hives or eczema    MEDICATIONS  (STANDING):  albuterol    90 MICROgram(s) HFA Inhaler 2 Puff(s) Inhalation every 6 hours  albuterol/ipratropium for Nebulization. 3 milliLiter(s) Nebulizer once  aspirin  chewable 81 milliGRAM(s) Oral daily  chlorhexidine 0.12% Liquid 15 milliLiter(s) Oral Mucosa every 12 hours  dextrose 5%. 1000 milliLiter(s) (100 mL/Hr) IV Continuous <Continuous>  dextrose 50% Injectable 25 Gram(s) IV Push once  dextrose 50% Injectable 12.5 Gram(s) IV Push once  dextrose 50% Injectable 25 Gram(s) IV Push once  glucagon  Injectable 1 milliGRAM(s) IntraMuscular once  heparin   Injectable 5000 Unit(s) SubCutaneous every 12 hours  insulin lispro (ADMELOG) corrective regimen sliding scale   SubCutaneous every 6 hours  ipratropium 17 MICROgram(s) HFA Inhaler 1 Puff(s) Inhalation every 6 hours  lactated ringers. 1000 milliLiter(s) (100 mL/Hr) IV Continuous <Continuous>  pantoprazole  Injectable 40 milliGRAM(s) IV Push daily  piperacillin/tazobactam IVPB.. 3.375 Gram(s) IV Intermittent every 8 hours    MEDICATIONS  (PRN):  dextrose Oral Gel 15 Gram(s) Oral once PRN Blood Glucose LESS THAN 70 milliGRAM(s)/deciliter  fentaNYL    Injectable 100 MICROGram(s) IV Push every 4 hours PRN MV synchr      ALLERGIES: No Known Allergies      FAMILY HISTORY:  FH: lung cancer        Social history:  Alochol:   Smoking:   Drug Use:   Marital Status:     PHYSICAL EXAMINATION:  -----------------------------  T(C): 36.4 (12-27-22 @ 08:30), Max: 36.7 (12-26-22 @ 12:31)  HR: 84 (12-27-22 @ 08:45) (79 - 98)  BP: 113/88 (12-27-22 @ 08:45) (93/71 - 131/101)  RR: 21 (12-27-22 @ 08:45) (0 - 27)  SpO2: 99% (12-27-22 @ 08:45) (93% - 99%)  Wt(kg): --    12-26 @ 07:01  -  12-27 @ 07:00  --------------------------------------------------------  IN:    dextrose 5%: 50 mL    dextrose 5% + sodium chloride 0.45%: 750 mL    Enteral Tube Flush: 360 mL    Glucerna 1.5: 600 mL    IV PiggyBack: 100 mL    Lactated Ringers: 1200 mL    Nepro with Carb Steady: 320 mL  Total IN: 3380 mL    OUT:    Chest Tube (mL): 0 mL    Indwelling Catheter - Urethral (mL): 1000 mL  Total OUT: 1000 mL    Total NET: 2380 mL            Constitutional: well developed, normal appearance, well groomed, well nourished, no deformities and no acute distress.   Eyes: the conjunctiva exhibited no abnormalities and the eyelids demonstrated no xanthelasmas.   HEENT: normal oral mucosa, no oral pallor and no oral cyanosis.   Neck: normal jugular venous A waves present, normal jugular venous V waves present and no jugular venous malone A waves.   Pulmonary: no respiratory distress, normal respiratory rhythm and effort, no accessory muscle use and lungs were clear to auscultation bilaterally. Anteriorly  Cardiovascular: heart rate and rhythm were normal, normal S1 and S2 and no murmur, gallop, rub, heave or thrill are present.   Musculoskeletal: the gait could not be assessed.  Extremities: no clubbing of the fingernails, no localized cyanosis, no petechial hemorrhages and no ischemic changes.   Skin: normal skin color and pigmentation, no rash, no venous stasis, no skin lesions, no skin ulcer and no xanthoma was observed.       LABS:   --------  12-27    147<H>  |  109<H>  |  124<H>  ----------------------------<  198<H>  4.7   |  26  |  4.20<H>    Ca    8.7      27 Dec 2022 06:36  Phos  4.4     12-27  Mg     5.8     12-27    TPro  6.5  /  Alb  2.7<L>  /  TBili  2.1<H>  /  DBili  x   /  AST  212<H>  /  ALT  1041<H>  /  AlkPhos  287<H>  12-27                         16.4   14.34 )-----------( 157      ( 27 Dec 2022 06:36 )             50.6       12-27 @ 06:36 BNP: 27255 pg/mL              Radiology:

## 2022-12-27 NOTE — PROVIDER CONTACT NOTE (CRITICAL VALUE NOTIFICATION) - ACTION/TREATMENT ORDERED:
Continue sespsis protocol
no new order at this time
Pt reevaluated  - Pt intubated - Critical care protocol followed
cont current treatment

## 2022-12-27 NOTE — PROGRESS NOTE ADULT - SUBJECTIVE AND OBJECTIVE BOX
Patient is a 62y old  Male who presents with a chief complaint of resp failure (27 Dec 2022 08:18)        HPI:  62M CAD, Dilated cardiomyopathy, S/p AICD, Afib/flutter, h/o substance abuse, CKD baseline creat approx 1.4 last admitted to [facility] with MSSA bacteremia, and CHF.  Sent from [facility] with acute hypoxic respiratory failure.  Was initially on BiPAP then became hypoxic.  Anesthesia intubated patient.  On post intubation Xray pneumothorax evident.  Patient with PEA arrest.  Chest tube placed by ED physician.  ROSC obtained.     Patient unable to give further history.     (23 Dec 2022 13:02)      SUBJECTIVE & OBJECTIVE: Pt seen and examined at bedside. chest tube b/l   vent depedendent     PHYSICAL EXAM:  T(C): 36.5 (12-27-22 @ 04:06), Max: 36.7 (12-26-22 @ 12:31)  HR: 84 (12-27-22 @ 08:45) (79 - 98)  BP: 113/88 (12-27-22 @ 08:45) (93/71 - 131/101)  RR: 21 (12-27-22 @ 08:45) (0 - 27)  SpO2: 99% (12-27-22 @ 08:45) (93% - 99%)  Wt(kg): --   GENERAL:vent depedene   HEAD:  Atraumatic, Normocephalic  NECK: Supple, No JVD  NERVOUS SYSTEM:  Alert  CHEST/LUNG: decrease air entry at the bases   HEART: Regular rate and rhythm; No murmurs, rubs, or gallops  ABDOMEN: Soft, Nontender, Nondistended; Bowel sounds present  EXTREMITIES:  2+ Peripheral Pulses, No clubbing, cyanosis, or edema        MEDICATIONS  (STANDING):  albuterol    90 MICROgram(s) HFA Inhaler 2 Puff(s) Inhalation every 6 hours  albuterol/ipratropium for Nebulization. 3 milliLiter(s) Nebulizer once  aspirin  chewable 81 milliGRAM(s) Oral daily  chlorhexidine 0.12% Liquid 15 milliLiter(s) Oral Mucosa every 12 hours  dextrose 5%. 1000 milliLiter(s) (100 mL/Hr) IV Continuous <Continuous>  dextrose 50% Injectable 25 Gram(s) IV Push once  dextrose 50% Injectable 12.5 Gram(s) IV Push once  dextrose 50% Injectable 25 Gram(s) IV Push once  glucagon  Injectable 1 milliGRAM(s) IntraMuscular once  heparin   Injectable 5000 Unit(s) SubCutaneous every 12 hours  insulin lispro (ADMELOG) corrective regimen sliding scale   SubCutaneous every 6 hours  ipratropium 17 MICROgram(s) HFA Inhaler 1 Puff(s) Inhalation every 6 hours  lactated ringers. 1000 milliLiter(s) (100 mL/Hr) IV Continuous <Continuous>  pantoprazole  Injectable 40 milliGRAM(s) IV Push daily  piperacillin/tazobactam IVPB.. 3.375 Gram(s) IV Intermittent every 8 hours    MEDICATIONS  (PRN):  dextrose Oral Gel 15 Gram(s) Oral once PRN Blood Glucose LESS THAN 70 milliGRAM(s)/deciliter  fentaNYL    Injectable 100 MICROGram(s) IV Push every 4 hours PRN MV synchr      LABS:                        16.4   14.34 )-----------( 157      ( 27 Dec 2022 06:36 )             50.6     12-27    147<H>  |  109<H>  |  124<H>  ----------------------------<  198<H>  4.7   |  26  |  4.20<H>    Ca    8.7      27 Dec 2022 06:36  Phos  4.4     12-27  Mg     5.8     12-27    TPro  6.5  /  Alb  2.7<L>  /  TBili  2.1<H>  /  DBili  x   /  AST  212<H>  /  ALT  1041<H>  /  AlkPhos  287<H>  12-27        Magnesium, Serum: 5.8 mg/dL (12-27 @ 06:36)    CAPILLARY BLOOD GLUCOSE      POCT Blood Glucose.: 159 mg/dL (27 Dec 2022 05:54)  POCT Blood Glucose.: 163 mg/dL (26 Dec 2022 23:08)  POCT Blood Glucose.: 155 mg/dL (26 Dec 2022 17:14)  POCT Blood Glucose.: 185 mg/dL (26 Dec 2022 12:34)      CAPILLARY BLOOD GLUCOSE      POCT Blood Glucose.: 159 mg/dL (27 Dec 2022 05:54)  POCT Blood Glucose.: 163 mg/dL (26 Dec 2022 23:08)  POCT Blood Glucose.: 155 mg/dL (26 Dec 2022 17:14)  POCT Blood Glucose.: 185 mg/dL (26 Dec 2022 12:34)    CAPILLARY BLOOD GLUCOSE      POCT Blood Glucose.: 159 mg/dL (27 Dec 2022 05:54)      CARDIAC MARKERS ( 27 Dec 2022 06:36 )  x     / x     / 40 U/L / x     / 0.5 ng/mL        RECENT CULTURES:      RADIOLOGY & ADDITIONAL TESTS:                        DVT/GI ppx  Discussed with pt @ bedside

## 2022-12-27 NOTE — PROGRESS NOTE ADULT - SUBJECTIVE AND OBJECTIVE BOX
Patient is a 62y old  Male who presents with a chief complaint of resp failure (27 Dec 2022 06:33)        HPI:  62M CAD, Dilated cardiomyopathy, S/p AICD, Afib/flutter, h/o substance abuse, CKD baseline creat approx 1.4 last admitted to [facility] with MSSA bacteremia, and CHF.  Sent from [facility] with acute hypoxic respiratory failure.  Was initially on BiPAP then became hypoxic.  Anesthesia intubated patient.  On post intubation Xray pneumothorax evident.  Patient with PEA arrest.  Chest tube placed by ED physician.  ROSC obtained.     Patient unable to give further history.     (23 Dec 2022 13:02)      SUBJECTIVE & OBJECTIVE: Pt seen and examined at bedside. intubatd on vent/ chest tube b/l     PHYSICAL EXAM:  T(C): 36.5 (12-27-22 @ 04:06), Max: 36.7 (12-26-22 @ 08:30)  HR: 94 (12-27-22 @ 06:00) (79 - 97)  BP: 122/89 (12-27-22 @ 06:00) (93/71 - 128/89)  RR: 17 (12-27-22 @ 06:00) (0 - 24)  SpO2: 98% (12-27-22 @ 06:00) (93% - 100%)  Wt(kg): --   GENERAL: ches ttube   HEAD:  Atraumatic, Normocephalic  NERVOUS SYSTEM:  Alert   CHEST/LUNG: decrease air entry at the bases   HEART: Regular rate and rhythm; No murmurs, rubs, or gallops  ABDOMEN: Soft, Nontender, Nondistended; Bowel sounds present  EXTREMITIES:  2+ Peripheral Pulses, No clubbing, cyanosis, or edema        MEDICATIONS  (STANDING):  albuterol    90 MICROgram(s) HFA Inhaler 2 Puff(s) Inhalation every 6 hours  albuterol/ipratropium for Nebulization. 3 milliLiter(s) Nebulizer once  aspirin  chewable 81 milliGRAM(s) Oral daily  chlorhexidine 0.12% Liquid 15 milliLiter(s) Oral Mucosa every 12 hours  dextrose 5%. 1000 milliLiter(s) (100 mL/Hr) IV Continuous <Continuous>  dextrose 50% Injectable 25 Gram(s) IV Push once  dextrose 50% Injectable 12.5 Gram(s) IV Push once  dextrose 50% Injectable 25 Gram(s) IV Push once  glucagon  Injectable 1 milliGRAM(s) IntraMuscular once  heparin   Injectable 5000 Unit(s) SubCutaneous every 12 hours  insulin lispro (ADMELOG) corrective regimen sliding scale   SubCutaneous every 6 hours  ipratropium 17 MICROgram(s) HFA Inhaler 1 Puff(s) Inhalation every 6 hours  lactated ringers. 1000 milliLiter(s) (100 mL/Hr) IV Continuous <Continuous>  methylPREDNISolone sodium succinate Injectable 40 milliGRAM(s) IV Push daily  pantoprazole  Injectable 40 milliGRAM(s) IV Push daily  piperacillin/tazobactam IVPB.. 3.375 Gram(s) IV Intermittent every 8 hours    MEDICATIONS  (PRN):  dextrose Oral Gel 15 Gram(s) Oral once PRN Blood Glucose LESS THAN 70 milliGRAM(s)/deciliter  fentaNYL    Injectable 100 MICROGram(s) IV Push every 4 hours PRN MV synchr      LABS:                        16.4   14.34 )-----------( 157      ( 27 Dec 2022 06:36 )             50.6     12-27    147<H>  |  109<H>  |  124<H>  ----------------------------<  198<H>  4.7   |  26  |  4.20<H>    Ca    8.7      27 Dec 2022 06:36  Phos  4.4     12-27  Mg     5.8     12-27    TPro  6.5  /  Alb  2.7<L>  /  TBili  2.1<H>  /  DBili  x   /  AST  212<H>  /  ALT  1041<H>  /  AlkPhos  287<H>  12-27        Magnesium, Serum: 5.8 mg/dL (12-27 @ 06:36)    CAPILLARY BLOOD GLUCOSE      POCT Blood Glucose.: 159 mg/dL (27 Dec 2022 05:54)  POCT Blood Glucose.: 163 mg/dL (26 Dec 2022 23:08)  POCT Blood Glucose.: 155 mg/dL (26 Dec 2022 17:14)  POCT Blood Glucose.: 185 mg/dL (26 Dec 2022 12:34)      CAPILLARY BLOOD GLUCOSE      POCT Blood Glucose.: 159 mg/dL (27 Dec 2022 05:54)  POCT Blood Glucose.: 163 mg/dL (26 Dec 2022 23:08)  POCT Blood Glucose.: 155 mg/dL (26 Dec 2022 17:14)  POCT Blood Glucose.: 185 mg/dL (26 Dec 2022 12:34)    CAPILLARY BLOOD GLUCOSE      POCT Blood Glucose.: 159 mg/dL (27 Dec 2022 05:54)      CARDIAC MARKERS ( 27 Dec 2022 06:36 )  x     / x     / 40 U/L / x     / 0.5 ng/mL        RECENT CULTURES:      RADIOLOGY & ADDITIONAL TESTS:                        DVT/GI ppx  Discussed with pt @ bedside

## 2022-12-27 NOTE — PROGRESS NOTE ADULT - SUBJECTIVE AND OBJECTIVE BOX
Patient is a 62y old  Male who presents with a chief complaint of resp failure (27 Dec 2022 16:23)      BRIEF HOSPITAL COURSE: ***    Events last 24 hours: ***    PAST MEDICAL & SURGICAL HISTORY:  Heart failure, systolic      CAD (coronary artery disease)      Hypertension      Nonischemic cardiomyopathy      COPD, moderate      2019 novel coronavirus disease (COVID-19)      Substance abuse      HLD (hyperlipidemia)      Cardiac LV ejection fraction 10-20%      AICD (automatic cardioverter/defibrillator) present      Cardiac LV ejection fraction 10-20%        Allergies    No Known Allergies    Intolerances    pork (Other)    FAMILY HISTORY:  FH: lung cancer        Social History:       Review of Systems:  unable to obtain 2/2 mental status       Physical Examination:    General: adult male, lying in bed, nad    HEENT: nc/at, pupils 4 mm equal round and reactive, ett and ogt in place    PULM: Diminished to auscultation bilaterally, moderate clear sputum appreciated via oral and ett suctioning.    CVS: Regular rate and rhythm, no murmurs, rubs, or gallops    ABD: Soft, nondistended, nontender, normoactive bowel sounds, no masses appreciated    EXT: Nonpitting edema, nontender    SKIN: Warm     NEURO: unresponsive, +cough, +gag, +pupils, +withdraws to noxious stimuli      Medications:      albuterol    90 MICROgram(s) HFA Inhaler 2 Puff(s) Inhalation every 6 hours  albuterol/ipratropium for Nebulization. 3 milliLiter(s) Nebulizer once  ipratropium 17 MICROgram(s) HFA Inhaler 1 Puff(s) Inhalation every 6 hours    fentaNYL    Injectable 100 MICROGram(s) IV Push every 4 hours PRN      aspirin  chewable 81 milliGRAM(s) Oral daily  heparin   Injectable 5000 Unit(s) SubCutaneous every 12 hours    lactulose Syrup 20 Gram(s) Oral two times a day  pantoprazole  Injectable 40 milliGRAM(s) IV Push daily      dextrose 50% Injectable 25 Gram(s) IV Push once  dextrose 50% Injectable 12.5 Gram(s) IV Push once  dextrose 50% Injectable 25 Gram(s) IV Push once  dextrose Oral Gel 15 Gram(s) Oral once PRN  glucagon  Injectable 1 milliGRAM(s) IntraMuscular once  insulin lispro (ADMELOG) corrective regimen sliding scale   SubCutaneous every 6 hours    dextrose 5%. 1000 milliLiter(s) IV Continuous <Continuous>  dextrose 5%. 1000 milliLiter(s) IV Continuous <Continuous>      chlorhexidine 0.12% Liquid 15 milliLiter(s) Oral Mucosa every 12 hours  mupirocin 2% Nasal 1 Application(s) Both Nostrils two times a day        Mode: AC/ CMV (Assist Control/ Continuous Mandatory Ventilation)  RR (machine): 20  TV (machine): 400  FiO2: 35  PEEP: 5  ITime: 1  MAP: 13  PIP: 32      ICU Vital Signs Last 24 Hrs  T(C): 36.7 (27 Dec 2022 21:04), Max: 36.9 (27 Dec 2022 15:34)  T(F): 98 (27 Dec 2022 21:04), Max: 98.4 (27 Dec 2022 15:34)  HR: 90 (27 Dec 2022 21:12) (76 - 108)  BP: 113/82 (27 Dec 2022 20:00) (93/71 - 137/101)  BP(mean): 92 (27 Dec 2022 20:00) (79 - 114)  ABP: --  ABP(mean): --  RR: 20 (27 Dec 2022 20:00) (11 - 27)  SpO2: 99% (27 Dec 2022 21:12) (93% - 100%)    O2 Parameters below as of 27 Dec 2022 20:00  Patient On (Oxygen Delivery Method): ventilator    O2 Concentration (%): 35      Vital Signs Last 24 Hrs  T(C): 36.7 (27 Dec 2022 21:04), Max: 36.9 (27 Dec 2022 15:34)  T(F): 98 (27 Dec 2022 21:04), Max: 98.4 (27 Dec 2022 15:34)  HR: 90 (27 Dec 2022 21:12) (76 - 108)  BP: 113/82 (27 Dec 2022 20:00) (93/71 - 137/101)  BP(mean): 92 (27 Dec 2022 20:00) (79 - 114)  RR: 20 (27 Dec 2022 20:00) (11 - 27)  SpO2: 99% (27 Dec 2022 21:12) (93% - 100%)    Parameters below as of 27 Dec 2022 20:00  Patient On (Oxygen Delivery Method): ventilator    O2 Concentration (%): 35        I&O's Detail    26 Dec 2022 07:01  -  27 Dec 2022 07:00  --------------------------------------------------------  IN:    dextrose 5%: 50 mL    dextrose 5% + sodium chloride 0.45%: 750 mL    Enteral Tube Flush: 360 mL    Glucerna 1.5: 600 mL    IV PiggyBack: 100 mL    Lactated Ringers: 1200 mL    Nepro with Carb Steady: 320 mL  Total IN: 3380 mL    OUT:    Chest Tube (mL): 0 mL    Indwelling Catheter - Urethral (mL): 1000 mL  Total OUT: 1000 mL    Total NET: 2380 mL      27 Dec 2022 07:01  -  27 Dec 2022 21:29  --------------------------------------------------------  IN:    dextrose 5%: 300 mL    Enteral Tube Flush: 360 mL    Lactated Ringers: 800 mL    Nepro with Carb Steady: 480 mL  Total IN: 1940 mL    OUT:    Chest Tube (mL): 0 mL    Indwelling Catheter - Urethral (mL): 600 mL  Total OUT: 600 mL    Total NET: 1340 mL            LABS:                        16.4   14.34 )-----------( 157      ( 27 Dec 2022 06:36 )             50.6     12-27    147<H>  |  109<H>  |  124<H>  ----------------------------<  198<H>  4.7   |  26  |  4.20<H>    Ca    8.7      27 Dec 2022 06:36  Phos  4.4     12-27  Mg     5.8     12-27    TPro  6.5  /  Alb  2.7<L>  /  TBili  2.1<H>  /  DBili  x   /  AST  212<H>  /  ALT  1041<H>  /  AlkPhos  287<H>  12-27      CARDIAC MARKERS ( 27 Dec 2022 06:36 )  x     / x     / 40 U/L / x     / 0.5 ng/mL      CAPILLARY BLOOD GLUCOSE      POCT Blood Glucose.: 198 mg/dL (27 Dec 2022 17:39)        CULTURES:  Culture Results:   Rare Pseudomonas aeruginosa  Normal Respiratory Cathy absent (12-24 @ 17:43)  Culture Results:   No growth to date. (12-23 @ 14:06)  Culture Results:   >100,000 CFU/ml Klebsiella oxytoca/Raoultella ornithinolytica (12-23 @ 11:16)  Culture Results:   No growth to date. (12-23 @ 10:17)      RADIOLOGY:   < from: Xray Chest 1 View-PORTABLE IMMEDIATE (Xray Chest 1 View-PORTABLE IMMEDIATE .) (12.27.22 @ 15:39) >  ACC: [id number] EXAM:  XR CHEST PORTABLE IMMED 1V                          PROCEDURE DATE:  12/27/2022          INTERPRETATION:  AP chest on December 27, 2022 at 3:30 PM. Patient   requires intubation.    Heart enlargement and left-sided defibrillator again noted.    Endotracheal tube and nasogastric tube remain.    There is a persistent loculated small right effusion with adjacent   atelectasis.    Chest is similar to December 26.    IMPRESSION: Unchanged chest as above.    --- End of Report ---            JAYCOB LEBRON MD; Attending Radiologist  This document has been electronically signed. Dec 27 2022  4:34PM    < end of copied text >        SUPPLEMENTAL O2:   LINES:  IVF:  FABIEN:   PPx:   CONTACT: Patient is a 62y old  Male who presents with a chief complaint of resp failure (27 Dec 2022 16:23)      BRIEF HOSPITAL COURSE:   61 yo m pmxh HTN, Dilated cardiomyopathy, afib/flutter s/p AICD, CHD (baseline Cr ~1.4), former substance abuse, recent admission to [facility] with MSSA bacteremia and CHF exacerbation presented with acute hypoxic respiratory failure railed NIPPV ultimately requiring intubation, post intubation cxr with left sided ptx with deterioration into PEA arrest s/p left ct placement by ED physician with course complicated by aspiration, shock state, shock liver, AK on CKD and metabolic encephalopathy.     Events last 24 hours:   Patient remains encephalopathic.  ammonia level slightly this am, started on lactulose.  DERRICK on CKD-started on IVF, good urine output.      PAST MEDICAL & SURGICAL HISTORY:  Heart failure, systolic      CAD (coronary artery disease)      Hypertension      Nonischemic cardiomyopathy      COPD, moderate      2019 novel coronavirus disease (COVID-19)      Substance abuse      HLD (hyperlipidemia)      Cardiac LV ejection fraction 10-20%      AICD (automatic cardioverter/defibrillator) present      Cardiac LV ejection fraction 10-20%        Allergies    No Known Allergies    Intolerances    pork (Other)    FAMILY HISTORY:  FH: lung cancer        Social History:       Review of Systems:  unable to obtain 2/2 mental status       Physical Examination:    General: adult male, lying in bed, nad    HEENT: nc/at, pupils 4 mm equal round and reactive, ett and ogt in place    PULM: Diminished to auscultation bilaterally, moderate clear sputum appreciated via oral and ett suctioning.    CVS: Regular rate and rhythm, no murmurs, rubs, or gallops    ABD: Soft, nondistended, nontender, normoactive bowel sounds, no masses appreciated    EXT: Nonpitting edema, nontender    SKIN: Warm     NEURO: unresponsive, +cough, +gag, +pupils, +withdraws to noxious stimuli      Medications:      albuterol    90 MICROgram(s) HFA Inhaler 2 Puff(s) Inhalation every 6 hours  albuterol/ipratropium for Nebulization. 3 milliLiter(s) Nebulizer once  ipratropium 17 MICROgram(s) HFA Inhaler 1 Puff(s) Inhalation every 6 hours    fentaNYL    Injectable 100 MICROGram(s) IV Push every 4 hours PRN      aspirin  chewable 81 milliGRAM(s) Oral daily  heparin   Injectable 5000 Unit(s) SubCutaneous every 12 hours    lactulose Syrup 20 Gram(s) Oral two times a day  pantoprazole  Injectable 40 milliGRAM(s) IV Push daily      dextrose 50% Injectable 25 Gram(s) IV Push once  dextrose 50% Injectable 12.5 Gram(s) IV Push once  dextrose 50% Injectable 25 Gram(s) IV Push once  dextrose Oral Gel 15 Gram(s) Oral once PRN  glucagon  Injectable 1 milliGRAM(s) IntraMuscular once  insulin lispro (ADMELOG) corrective regimen sliding scale   SubCutaneous every 6 hours    dextrose 5%. 1000 milliLiter(s) IV Continuous <Continuous>  dextrose 5%. 1000 milliLiter(s) IV Continuous <Continuous>      chlorhexidine 0.12% Liquid 15 milliLiter(s) Oral Mucosa every 12 hours  mupirocin 2% Nasal 1 Application(s) Both Nostrils two times a day        Mode: AC/ CMV (Assist Control/ Continuous Mandatory Ventilation)  RR (machine): 20  TV (machine): 400  FiO2: 35  PEEP: 5  ITime: 1  MAP: 13  PIP: 32      ICU Vital Signs Last 24 Hrs  T(C): 36.7 (27 Dec 2022 21:04), Max: 36.9 (27 Dec 2022 15:34)  T(F): 98 (27 Dec 2022 21:04), Max: 98.4 (27 Dec 2022 15:34)  HR: 90 (27 Dec 2022 21:12) (76 - 108)  BP: 113/82 (27 Dec 2022 20:00) (93/71 - 137/101)  BP(mean): 92 (27 Dec 2022 20:00) (79 - 114)  ABP: --  ABP(mean): --  RR: 20 (27 Dec 2022 20:00) (11 - 27)  SpO2: 99% (27 Dec 2022 21:12) (93% - 100%)    O2 Parameters below as of 27 Dec 2022 20:00  Patient On (Oxygen Delivery Method): ventilator    O2 Concentration (%): 35      Vital Signs Last 24 Hrs  T(C): 36.7 (27 Dec 2022 21:04), Max: 36.9 (27 Dec 2022 15:34)  T(F): 98 (27 Dec 2022 21:04), Max: 98.4 (27 Dec 2022 15:34)  HR: 90 (27 Dec 2022 21:12) (76 - 108)  BP: 113/82 (27 Dec 2022 20:00) (93/71 - 137/101)  BP(mean): 92 (27 Dec 2022 20:00) (79 - 114)  RR: 20 (27 Dec 2022 20:00) (11 - 27)  SpO2: 99% (27 Dec 2022 21:12) (93% - 100%)    Parameters below as of 27 Dec 2022 20:00  Patient On (Oxygen Delivery Method): ventilator    O2 Concentration (%): 35        I&O's Detail    26 Dec 2022 07:01  -  27 Dec 2022 07:00  --------------------------------------------------------  IN:    dextrose 5%: 50 mL    dextrose 5% + sodium chloride 0.45%: 750 mL    Enteral Tube Flush: 360 mL    Glucerna 1.5: 600 mL    IV PiggyBack: 100 mL    Lactated Ringers: 1200 mL    Nepro with Carb Steady: 320 mL  Total IN: 3380 mL    OUT:    Chest Tube (mL): 0 mL    Indwelling Catheter - Urethral (mL): 1000 mL  Total OUT: 1000 mL    Total NET: 2380 mL      27 Dec 2022 07:01  -  27 Dec 2022 21:29  --------------------------------------------------------  IN:    dextrose 5%: 300 mL    Enteral Tube Flush: 360 mL    Lactated Ringers: 800 mL    Nepro with Carb Steady: 480 mL  Total IN: 1940 mL    OUT:    Chest Tube (mL): 0 mL    Indwelling Catheter - Urethral (mL): 600 mL  Total OUT: 600 mL    Total NET: 1340 mL            LABS:                        16.4   14.34 )-----------( 157      ( 27 Dec 2022 06:36 )             50.6     12-27    147<H>  |  109<H>  |  124<H>  ----------------------------<  198<H>  4.7   |  26  |  4.20<H>    Ca    8.7      27 Dec 2022 06:36  Phos  4.4     12-27  Mg     5.8     12-27    TPro  6.5  /  Alb  2.7<L>  /  TBili  2.1<H>  /  DBili  x   /  AST  212<H>  /  ALT  1041<H>  /  AlkPhos  287<H>  12-27      CARDIAC MARKERS ( 27 Dec 2022 06:36 )  x     / x     / 40 U/L / x     / 0.5 ng/mL      CAPILLARY BLOOD GLUCOSE      POCT Blood Glucose.: 198 mg/dL (27 Dec 2022 17:39)        CULTURES:  Culture Results:   Rare Pseudomonas aeruginosa  Normal Respiratory Cathy absent (12-24 @ 17:43)  Culture Results:   No growth to date. (12-23 @ 14:06)  Culture Results:   >100,000 CFU/ml Klebsiella oxytoca/Raoultella ornithinolytica (12-23 @ 11:16)  Culture Results:   No growth to date. (12-23 @ 10:17)      RADIOLOGY:   < from: Xray Chest 1 View-PORTABLE IMMEDIATE (Xray Chest 1 View-PORTABLE IMMEDIATE .) (12.27.22 @ 15:39) >  ACC: [id number] EXAM:  XR CHEST PORTABLE IMMED 1V                          PROCEDURE DATE:  12/27/2022          INTERPRETATION:  AP chest on December 27, 2022 at 3:30 PM. Patient   requires intubation.    Heart enlargement and left-sided defibrillator again noted.    Endotracheal tube and nasogastric tube remain.    There is a persistent loculated small right effusion with adjacent   atelectasis.    Chest is similar to December 26.    IMPRESSION: Unchanged chest as above.    --- End of Report ---            JAYCOB LEBRON MD; Attending Radiologist  This document has been electronically signed. Dec 27 2022  4:34PM    < end of copied text >        SUPPLEMENTAL O2:   LINES:  IVF:  FABIEN:   PPx:   CONTACT:

## 2022-12-27 NOTE — PROGRESS NOTE ADULT - SUBJECTIVE AND OBJECTIVE BOX
Date/Time Patient Seen:  		  Referring MD:   Data Reviewed	       Patient is a 62y old  Male who presents with a chief complaint of resp failure (26 Dec 2022 22:43)      Subjective/HPI     PAST MEDICAL & SURGICAL HISTORY:  Heart failure, systolic    CAD (coronary artery disease)    Hypertension    Nonischemic cardiomyopathy    COPD, moderate    2019 novel coronavirus disease (COVID-19)    Substance abuse    HLD (hyperlipidemia)    Cardiac LV ejection fraction 10-20%    No significant past surgical history    AICD (automatic cardioverter/defibrillator) present    Cardiac LV ejection fraction 10-20%          Medication list         MEDICATIONS  (STANDING):  albuterol    90 MICROgram(s) HFA Inhaler 2 Puff(s) Inhalation every 6 hours  albuterol/ipratropium for Nebulization. 3 milliLiter(s) Nebulizer once  aspirin  chewable 81 milliGRAM(s) Oral daily  chlorhexidine 0.12% Liquid 15 milliLiter(s) Oral Mucosa every 12 hours  dextrose 5%. 1000 milliLiter(s) (100 mL/Hr) IV Continuous <Continuous>  dextrose 50% Injectable 25 Gram(s) IV Push once  dextrose 50% Injectable 12.5 Gram(s) IV Push once  dextrose 50% Injectable 25 Gram(s) IV Push once  glucagon  Injectable 1 milliGRAM(s) IntraMuscular once  heparin   Injectable 5000 Unit(s) SubCutaneous every 12 hours  insulin lispro (ADMELOG) corrective regimen sliding scale   SubCutaneous every 6 hours  ipratropium 17 MICROgram(s) HFA Inhaler 1 Puff(s) Inhalation every 6 hours  lactated ringers. 1000 milliLiter(s) (100 mL/Hr) IV Continuous <Continuous>  methylPREDNISolone sodium succinate Injectable 40 milliGRAM(s) IV Push daily  pantoprazole  Injectable 40 milliGRAM(s) IV Push daily  piperacillin/tazobactam IVPB.. 3.375 Gram(s) IV Intermittent every 8 hours    MEDICATIONS  (PRN):  dextrose Oral Gel 15 Gram(s) Oral once PRN Blood Glucose LESS THAN 70 milliGRAM(s)/deciliter  fentaNYL    Injectable 100 MICROGram(s) IV Push every 4 hours PRN MV synchr         Vitals log        ICU Vital Signs Last 24 Hrs  T(C): 36.5 (27 Dec 2022 04:06), Max: 36.7 (26 Dec 2022 08:30)  T(F): 97.7 (27 Dec 2022 04:06), Max: 98.1 (26 Dec 2022 12:31)  HR: 94 (27 Dec 2022 06:00) (79 - 97)  BP: 122/89 (27 Dec 2022 06:00) (90/67 - 128/89)  BP(mean): 100 (27 Dec 2022 06:00) (76 - 101)  ABP: --  ABP(mean): --  RR: 17 (27 Dec 2022 06:00) (0 - 24)  SpO2: 98% (27 Dec 2022 06:00) (93% - 100%)    O2 Parameters below as of 27 Dec 2022 06:00  Patient On (Oxygen Delivery Method): ventilator    O2 Concentration (%): 35         Mode: AC/ CMV (Assist Control/ Continuous Mandatory Ventilation)  RR (machine): 20  TV (machine): 400  FiO2: 35  PEEP: 5  ITime: 1  MAP: 11  PIP: 28      Input and Output:  I&O's Detail    25 Dec 2022 07:01  -  26 Dec 2022 07:00  --------------------------------------------------------  IN:    Enteral Tube Flush: 425 mL    Glucerna 1.5: 580 mL    IV PiggyBack: 450 mL  Total IN: 1455 mL    OUT:    Indwelling Catheter - Urethral (mL): 800 mL  Total OUT: 800 mL    Total NET: 655 mL      26 Dec 2022 07:01  -  27 Dec 2022 06:34  --------------------------------------------------------  IN:    dextrose 5%: 50 mL    dextrose 5% + sodium chloride 0.45%: 750 mL    Enteral Tube Flush: 360 mL    Glucerna 1.5: 600 mL    IV PiggyBack: 100 mL    Lactated Ringers: 1200 mL    Nepro with Carb Steady: 320 mL  Total IN: 3380 mL    OUT:    Chest Tube (mL): 0 mL    Indwelling Catheter - Urethral (mL): 1000 mL  Total OUT: 1000 mL    Total NET: 2380 mL          Lab Data                        15.9   13.56 )-----------( 182      ( 26 Dec 2022 06:46 )             49.4     12-26    147<H>  |  107  |  119<H>  ----------------------------<  198<H>  4.5   |  28  |  4.58<H>    Ca    8.8      26 Dec 2022 06:46    TPro  6.9  /  Alb  2.9<L>  /  TBili  2.2<H>  /  DBili  x   /  AST  670<H>  /  ALT  1824<H>  /  AlkPhos  306<H>  12-26            Review of Systems	      Objective     Physical Examination    heart s1s2  lung dec bS  head nc      Pertinent Lab findings & Imaging      Leo:  NO   Adequate UO     I&O's Detail    25 Dec 2022 07:01  -  26 Dec 2022 07:00  --------------------------------------------------------  IN:    Enteral Tube Flush: 425 mL    Glucerna 1.5: 580 mL    IV PiggyBack: 450 mL  Total IN: 1455 mL    OUT:    Indwelling Catheter - Urethral (mL): 800 mL  Total OUT: 800 mL    Total NET: 655 mL      26 Dec 2022 07:01  -  27 Dec 2022 06:34  --------------------------------------------------------  IN:    dextrose 5%: 50 mL    dextrose 5% + sodium chloride 0.45%: 750 mL    Enteral Tube Flush: 360 mL    Glucerna 1.5: 600 mL    IV PiggyBack: 100 mL    Lactated Ringers: 1200 mL    Nepro with Carb Steady: 320 mL  Total IN: 3380 mL    OUT:    Chest Tube (mL): 0 mL    Indwelling Catheter - Urethral (mL): 1000 mL  Total OUT: 1000 mL    Total NET: 2380 mL               Discussed with:     Cultures:	        Radiology

## 2022-12-27 NOTE — PROGRESS NOTE ADULT - SUBJECTIVE AND OBJECTIVE BOX
[facility], Division of Infectious Diseases  [facility] Y. Patel, S. Shah, [facility]. [facility]  [phone]    Name: DOROTHY VOSS  Age: 62y  Gender: Male  MRN: [id number]    Interval History:  Patient seen and examined at bedside in SPCU  No acute overnight events. Afebrile  Notes reviewed    Antibiotics:  piperacillin/tazobactam IVPB.. 3.375 Gram(s) IV Intermittent every 8 hours      Medications:  albuterol    90 MICROgram(s) HFA Inhaler 2 Puff(s) Inhalation every 6 hours  albuterol/ipratropium for Nebulization. 3 milliLiter(s) Nebulizer once  aspirin  chewable 81 milliGRAM(s) Oral daily  chlorhexidine 0.12% Liquid 15 milliLiter(s) Oral Mucosa every 12 hours  dextrose 5%. 1000 milliLiter(s) IV Continuous <Continuous>  dextrose 50% Injectable 25 Gram(s) IV Push once  dextrose 50% Injectable 12.5 Gram(s) IV Push once  dextrose 50% Injectable 25 Gram(s) IV Push once  dextrose Oral Gel 15 Gram(s) Oral once PRN  fentaNYL    Injectable 100 MICROGram(s) IV Push every 4 hours PRN  glucagon  Injectable 1 milliGRAM(s) IntraMuscular once  heparin   Injectable 5000 Unit(s) SubCutaneous every 12 hours  insulin lispro (ADMELOG) corrective regimen sliding scale   SubCutaneous every 6 hours  ipratropium 17 MICROgram(s) HFA Inhaler 1 Puff(s) Inhalation every 6 hours  lactated ringers. 1000 milliLiter(s) IV Continuous <Continuous>  pantoprazole  Injectable 40 milliGRAM(s) IV Push daily  piperacillin/tazobactam IVPB.. 3.375 Gram(s) IV Intermittent every 8 hours      Review of Systems:  unable to obtain  Allergies: No Known Allergies    For details regarding the patient's past medical history, social history, family history, and other miscellaneous elements, please refer the initial infectious diseases consultation and/or the admitting history and physical examination for this admission.    Objective:  Vitals:   T(C): 36.4 (12-27-22 @ 08:30), Max: 36.7 (12-26-22 @ 15:35)  HR: 76 (12-27-22 @ 12:00) (76 - 98)  BP: 101/83 (12-27-22 @ 12:00) (93/71 - 131/101)  RR: 14 (12-27-22 @ 12:00) (0 - 27)  SpO2: 98% (12-27-22 @ 12:00) (93% - 99%)    Physical Examination:  General: elderly M, intubated  HEENT: NC/AT, ETT in place  Lungs: MV breath sounds  Chest: L CT in place  Heart: RRR  Abdomen: Soft.   Extremities: No cyanosis or clubbing. No edema.       Laboratory Studies:  CBC:                       16.4   14.34 )-----------( 157      ( 27 Dec 2022 06:36 )             50.6     CMP: 12-27    147<H>  |  109<H>  |  124<H>  ----------------------------<  198<H>  4.7   |  26  |  4.20<H>    Ca    8.7      27 Dec 2022 06:36  Phos  4.4     12-27  Mg     5.8     12-27    TPro  6.5  /  Alb  2.7<L>  /  TBili  2.1<H>  /  DBili  x   /  AST  212<H>  /  ALT  1041<H>  /  AlkPhos  287<H>  12-27    LIVER FUNCTIONS - ( 27 Dec 2022 06:36 )  Alb: 2.7 g/dL / Pro: 6.5 g/dL / ALK PHOS: 287 U/L / ALT: 1041 U/L DA / AST: 212 U/L / GGT: x               Microbiology: reviewed    Culture - Sputum (collected 12-24-22 @ 17:43)  Source: ET Tube ET Tube  Gram Stain (12-25-22 @ 07:58):    Numerous polymorphonuclear leukocytes per low power field    No Squamous epithelial cells per low power field    Rare Gram Positive Rods seen per oil power field  Final Report (12-26-22 @ 16:49):    Rare Pseudomonas aeruginosa    Normal Respiratory Cathy absent  Organism: Pseudomonas aeruginosa (12-26-22 @ 16:49)  Organism: Pseudomonas aeruginosa (12-26-22 @ 16:49)      -  Amikacin: S <=16      -  Aztreonam: S 8      -  Cefepime: S <=2      -  Ceftazidime: S 4      -  Ciprofloxacin: S <=0.25      -  Gentamicin: S 4      -  Imipenem: S <=1      -  Levofloxacin: S <=0.5      -  Meropenem: S <=1      -  Piperacillin/Tazobactam: S <=8      -  Tobramycin: S <=2      Method Type: BAY    Culture - Blood (collected 12-23-22 @ 14:06)  Source: .Blood Blood-Peripheral  Preliminary Report (12-24-22 @ 19:01):    No growth to date.    Culture - Urine (collected 12-23-22 @ 11:16)  Source: Clean Catch Clean Catch (Midstream)  Final Report (12-26-22 @ 07:02):    >100,000 CFU/ml Klebsiella oxytoca/Raoultella ornithinolytica  Organism: Klebsiella oxytoca /Raoutella ornithinolytica (12-26-22 @ 07:02)  Organism: Klebsiella oxytoca /Raoutella ornithinolytica (12-26-22 @ 07:02)      -  Amikacin: S <=16      -  Amoxicillin/Clavulanic Acid: S <=8/4      -  Ampicillin: R 16 These ampicillin results predict results for amoxicillin      -  Ampicillin/Sulbactam: S <=4/2 Enterobacter, Klebsiella aerogenes, Citrobacter, and Serratia may develop resistance during prolonged therapy (3-4 days)      -  Aztreonam: S <=4      -  Cefazolin: S <=2      -  Cefepime: S <=2      -  Cefoxitin: S <=8      -  Ceftriaxone: S <=1 Enterobacter, Klebsiella aerogenes, Citrobacter, and Serratia may develop resistance during prolonged therapy      -  Ciprofloxacin: S <=0.25      -  Ertapenem: S <=0.5      -  Gentamicin: S <=2      -  Imipenem: S <=1      -  Levofloxacin: S <=0.5      -  Meropenem: S <=1      -  Nitrofurantoin: S <=32 Should not be used to treat pyelonephritis      -  Piperacillin/Tazobactam: S <=8      -  Tobramycin: S <=2      -  Trimethoprim/Sulfamethoxazole: S <=0.5/9.5      Method Type: BAY    Culture - Blood (collected 12-23-22 @ 10:17)  Source: .Blood Blood-Peripheral  Preliminary Report (12-24-22 @ 18:01):    No growth to date.          Radiology: reviewed

## 2022-12-27 NOTE — PROGRESS NOTE ADULT - ASSESSMENT
62M CAD, Dilated cardiomyopathy, S/p AICD, Afib/flutter, h/o substance abuse, CKD baseline creat approx 1.4 last admitted to [facility] with MSSA bacteremia, and CHF.  Sent from [facility] with acute hypoxic respiratory failure/PEA arrest.  CT Head - No acute pathology.  Positive brain stem functions. Pt is breathing over the vent.   Hypoxic/Ischemic Encephalopathy. Only time could tell regarding the severity.   No seizure reported.  Rec Repeat CT head in 48 hrs from now.  D/w covering nurse.  Would follow.

## 2022-12-27 NOTE — PROGRESS NOTE ADULT - ASSESSMENT
Pt is a 62M CAD, Dilated cardiomyopathy, S/p AICD, Afib/flutter, h/o substance abuse, CKD baseline creat approx 1.4 last admitted to [facility] with MSSA bacteremia, and CHF.    Sent from [facility] with acute hypoxic respiratory failure. Was initially on BiPAP then became hypoxic.  Anesthesia intubated patient.  On post intubation Xray pneumothorax evident.  Patient with PEA arrest.  Chest tube placed by ED physician.  ROSC obtained.       S/p PEA arrest  AHRF requiring intubation  ?Aspiration PNA   Loculated pleural effusions  - imaging reviewed as above  - CT reviewed  - BCx NGTD  - Sputum Cx -- Pseudomonas  - UCx -- Klebsiella  - susceptibilities reviewed  - c/w zosyn, plan x7 day course  - can hold additional vancomycin  - trend temps/WBC--elevated but stable  - maintain aspiration precautions  - ICU care    GOC per primary team    Covering Dr. Phan  Infectious Diseases will continue to follow. Please call with any questions.   Shila Frazier M.D.  [facility] Division of Infectious Diseases [phone]

## 2022-12-27 NOTE — PROGRESS NOTE ADULT - SUBJECTIVE AND OBJECTIVE BOX
Patient is a 62y old  Male who presents with a chief complaint of resp failure (26 Dec 2022 09:47)    HPI: 62M CAD, Dilated cardiomyopathy, S/p AICD, Afib/flutter, h/o substance abuse, CKD baseline creat approx 1.4 last admitted to [facility] with MSSA bacteremia, and CHF.  Sent from [facility] with acute hypoxic respiratory failure.  Was initially on BiPAP then became hypoxic.  Anesthesia intubated patient.  On post intubation Xray pneumothorax evident.  Patient with PEA arrest.  Chest tube placed by ED physician.  ROSC obtained.     Patient unable to give further history.      Intetrval history -     Intubated on vent.  Was on Cpap earlier.  Breathing over the bent.  No seizure reported.    MEDICATIONS  (STANDING):    albuterol    90 MICROgram(s) HFA Inhaler 2 Puff(s) Inhalation every 6 hours  albuterol/ipratropium for Nebulization. 3 milliLiter(s) Nebulizer once  aspirin  chewable 81 milliGRAM(s) Oral daily  chlorhexidine 0.12% Liquid 15 milliLiter(s) Oral Mucosa every 12 hours  dextrose 5%. 1000 milliLiter(s) (75 mL/Hr) IV Continuous <Continuous>  dextrose 5%. 1000 milliLiter(s) (100 mL/Hr) IV Continuous <Continuous>  dextrose 50% Injectable 25 Gram(s) IV Push once  dextrose 50% Injectable 12.5 Gram(s) IV Push once  dextrose 50% Injectable 25 Gram(s) IV Push once  glucagon  Injectable 1 milliGRAM(s) IntraMuscular once  heparin   Injectable 5000 Unit(s) SubCutaneous every 12 hours  insulin lispro (ADMELOG) corrective regimen sliding scale   SubCutaneous every 6 hours  ipratropium 17 MICROgram(s) HFA Inhaler 1 Puff(s) Inhalation every 6 hours  pantoprazole  Injectable 40 milliGRAM(s) IV Push daily  piperacillin/tazobactam IVPB.. 3.375 Gram(s) IV Intermittent every 8 hours    MEDICATIONS  (PRN):  dextrose Oral Gel 15 Gram(s) Oral once PRN Blood Glucose LESS THAN 70 milliGRAM(s)/deciliter  fentaNYL    Injectable 100 MICROGram(s) IV Push every 4 hours PRN MV synchr    Allergies    No Known Allergies    Intolerances    pork (Other)      REVIEW OF SYSTEMS: UTO    PHYSICAL EXAM:    Vital Signs Last 24 Hrs    T(C): 36.9 (27 Dec 2022 15:34), Max: 36.9 (27 Dec 2022 15:34)  T(F): 98.4 (27 Dec 2022 15:34), Max: 98.4 (27 Dec 2022 15:34)  HR: 98 (27 Dec 2022 15:25) (76 - 103)  BP: 137/101 (27 Dec 2022 14:00) (93/71 - 137/101)  BP(mean): 114 (27 Dec 2022 14:00) (77 - 114)  RR: 22 (27 Dec 2022 14:00) (0 - 27)  SpO2: 98% (27 Dec 2022 15:25) (93% - 99%)    Parameters below as of 27 Dec 2022 14:00  Patient On (Oxygen Delivery Method): BiPAP/CPAP    O2 Concentration (%): 35    On Neurological Examination:    Intubated on vent.  Not on any sedation.  Doesn't respond responds to pain stimuli.  Pupils are 3 mm, sluggishly reacting to light.  Corneal reflexes are positive.  Gag reflex is positive  Neck is supple.  No abn body movements.  Breathing over the vent. Vent set rate is 20, breathing at 22    LABS:    CBC Full  -  ( 27 Dec 2022 06:36 )  WBC Count : 14.34 K/uL  RBC Count : 5.41 M/uL  Hemoglobin : 16.4 g/dL  Hematocrit : 50.6 %  Platelet Count - Automated : 157 K/uL  Mean Cell Volume : 93.5 fl  Mean Cell Hemoglobin : 30.3 pg  Mean Cell Hemoglobin Concentration : 32.4 gm/dL    12-27    147<H>  |  109<H>  |  124<H>  ----------------------------<  198<H>  4.7   |  26  |  4.20<H>    Ca    8.7      27 Dec 2022 06:36  Phos  4.4     12-27  Mg     5.8     12-27    TPro  6.5  /  Alb  2.7<L>  /  TBili  2.1<H>  /  DBili  x   /  AST  212<H>  /  ALT  1041<H>  /  AlkPhos  287<H>  12-27               RADIOLOGY & ADDITIONAL STUDIES:    < from: CT Head No Cont (12.23.22 @ 21:18) >    IMPRESSION:    No large territory acute infarct, intracranial hemorrhage, or mass effect.    Slight progression of chronic microangiopathic white matter changes as compared to prior study from 2016.    < end of copied text >    < from: CT Chest No Cont (12.23.22 @ 21:19) >    IMPRESSION:    *  Left chest tube with residual small left pneumothorax. No evidence of tension.  *  Right basilar rounded atelectasis again noted with chronic small loculated right pleural effusion.  *  Small pneumomediastinum. Left chest and abdominal wall emphysema. Moderate pneumoretroperitoneum and properitoneal air. No pneumoperitoneum. Findings are compatible with barotrauma.  *  Evidence of urinary bladder cystitis.    < end of copied text >

## 2022-12-27 NOTE — PROGRESS NOTE ADULT - ASSESSMENT
62M CAD, Dilated cardiomyopathy, S/p AICD, Afib/flutter, h/o substance abuse,  presented with acute hypoxic and hypercarbic respiratory failure associated with pneumothorax which lled to cardiac arrest       Acute respiratory failure secondary to tension pneumothorax with leading to cardiac arrest   - continue vent management  - continue chest tube to suction.  Discussed case with CT surgery - no need for other acute intervention at this time. Unable to do onsite evaluation, but if     minium outpt from chest tube  will order serial chest xray  contiue on zosyn,   no wheezing noted  will order to taper predisone     hypernatremia  Na 149,   started on  d5 1/2 NS 75 cc/hr   NA improved to 147         Cardiomyopathy  - current cardiac issues appear to be secondary to pulm issues at this time  - hold medications for now   - hold eliquis pending possible further procedures and hospital course    DM2  - not on meds at [facility]  - FS monitoring with lispro coverage scale while critically ill    DERRICK on CKD  - Due to above, possible HF as well  - monitor creatinine,   will start on cautius hydraiton  trend cretine     Prophylactic measure  - DVT proph: heparin SQ for now  - GI proph: protonix    D/w ADELFO, Dr Goins, Dr Zapien

## 2022-12-27 NOTE — PHARMACOTHERAPY INTERVENTION NOTE - COMMENTS
Recommended to adjust piperacillin/tazobactam dose from 3.375 g IV q8h to 3.375 g IV q12h since the eGFR is 15 mL/min/1.73 m2.    Ana Smith, PharmD, [facility]  Clinical Pharmacy Specialist, Infectious Diseases  Tele-Antimicrobial Stewardship Program (Tele-ASP)  Tele-ASP Phone: [phone]

## 2022-12-27 NOTE — PROGRESS NOTE ADULT - SUBJECTIVE AND OBJECTIVE BOX
NEPHROLOGY PROGRESS NOTE    CHIEF COMPLAINT:  DERRICK    HPI:  Renal function plateauing.  Non oliguric.  No pressors.  LR infusing at 100 mL/hr.      EXAM:  T(F): 97.6 (12-27-22 @ 12:54)  HR: 103 (12-27-22 @ 14:00)  BP: 137/101 (12-27-22 @ 14:00)  RR: 22 (12-27-22 @ 14:00)  SpO2: 98% (12-27-22 @ 14:00)    Unresponsive  Normal respiratory effort, lungs clear bilaterally  Heart RRR with no murmur, no peripheral edema         LABS                             16.4   14.34 )-----------( 157      ( 27 Dec 2022 06:36 )             50.6          12-27    147<H>  |  109<H>  |  124<H>  ----------------------------<  198<H>  4.7   |  26  |  4.20<H>    Ca    8.7      27 Dec 2022 06:36  Phos  4.4     12-27  Mg     5.8     12-27    TPro  6.5  /  Alb  2.7<L>  /  TBili  2.1<H>  /  DBili  x   /  AST  212<H>  /  ALT  1041<H>  /  AlkPhos  287<H>  12-27           ASSESSMENT:  1.  DERRICK due to ischemic ATN - maintenance phase - non oliguric  2.  Acute respiratory failure and PEA arrest with ROSC  3.  Mild hypernatremia  4.  Status post large left sided tension pneumothorax  5.  Anoxic encephalopathy    PLAN:  DC LR and start IV D5W  No acute dialytic indications  Neuro prognosticate daily

## 2022-12-28 NOTE — PROGRESS NOTE ADULT - ASSESSMENT
REVIEW OF SYMPTOMS      Able to give (reliable) ROS  NO     PHYSICAL EXAM    HEENT Unremarkable  atraumatic   RESP Fair air entry EXP prolonged    Harsh breath sound Resp distres mild   CARDIAC S1 S2 No S3     NO JVD    ABDOMEN SOFT BS PRESENT NOT DISTENDED No hepatosplenomegaly   PEDAL EDEMA present No calf tenderness  NO rash     GENERAL DATA .   GOC.   12/23/2022 full code  ALLGY.     nka                  WT.     12/24/2022 81           BMI.       12/24/2022 26          ICU STAY. .. 12/23/2022  COVID. ..  12/23/2022 scv2 (-)   BEST PRACTICE ISSUES.    HOB ELEVATN. Yes  DVT PPLX. ..    12/23/2022 hpsc   WHITMORE PPLX. ..    12/23/2022 protonix 40   INFN PPLX. ..  12/23/2022 chlorhexidine .12%   SP SW VIVIAN.         DIET.  .. 12/26 nepro 960 ng    IV fl... ; 1/2 ns 75   PROCEDURES...   12/23/2022  l chest tube   12/23/2022 intubated   12/23/2022 reed     PAST PROCEDURES.  .  12/25/2022 glucerna 960 ng .     ABGS.  12/25/2022 ac 40% 743/44/108   12/24/2022 ac 60% 746/41/190   12/23/2022 11 a 100% 705/95/68     VS/ PO/IO/ VENT/ DRIPS.   12/28/2022 84 100/80   12/28/2022 ac 20/400/5/.35     PREV ADMISSION 2/11-2/25/2022 [facility]    MAIN ISSUES.  62 m doa 12/23/2022 resp distress  PMH COPD  PMH COVID (+) 2/11/2022   PMH S aureus bacteremia mssa 2/11   .. Ancef 2/12/2022 Dr Phan  [facility] AICD  PMH HFREF  .. ECHO 11/20/2021 ef 20%  PMH A fib (on eliquis)     CAC 12/23/2022 rosc 25 min   Intubation 12/23/2022  Vent mgmt 12/23/2022   sedation.  Pneumothorax... 12/23/2022 cxr tension pntx  Infection.  Possible aspn pneum 12/23/2022  Possible uti 12/23/2022   .. ua 12/23/2022 w 26-50   .. mrsa 12/23 (+)   .. 12/24 et pseudo  .. 12/23 uc klebsiella   .. 12/23 zosyn   Lacticemia.  elevated lfts.  DERRICK.  Hypernatremia.  Anoxic encephalo    HOME MEDS INCLUDE.  apixaba 5.2   sacubitril valsartan 24 26    bumetanide 2   spironolactone 25  protonix 40   amiodarone 200     PROBLEMS ASSESSMENT RECOMMENDATIONS.  Intubation 12/23/2022  Vent mgmnt.   .. bundle dsv dsbt ltvv pplat 30 (-) PO2 60 (+) ph 7.3 (+)  sedation.  .. 12/23/2022 fentanyl 100.4p   .. 12/26/2022 is not requiring sedation (anoxic encephalo)   Weaning.  .. 12/28/2022 failed cpap   COPD.  .. 12/23/2022 combivent .4     .. 12/23/2022 solumed 40.3 -> 12/24/2022 solumed 40   .. 12/23/2022 will taper to 40 on 12/24   .. cont rx  Code 12/23/2022    .. 12/23/2022 was  restless when he came in No defib  .. 12/23/2022 coded at 12.13 p pea arrest about 25 min rosc   .. 12/23/2022 monitor neuro recovery  .. 12/24/2022 eyes open gag (+) does not follow commands   Pneumothorax.  .. 12/23/2022 cxr tension pntx  .. 12/23/2022 chets tube inserted  .. cxr 12/24/2022 et tube ng tube cardiac device cm sm r effs and basal infiltr l chest tube npo pntx sc emphysema improvd   .. 12/24/2022 bleeding at chest tube insertion site and scant drainage   .. 12/24/2022 above chest tube issues dw Dr Borges and she felkt bleeding likely sec recent apixaba and no drainage likely sec to resolution of pntx  and advised observation   .. follow clinically and with imaging   .. 12/24/2022 plan is to wean off vent and then plan chest tube remoival   .. 12/26/2022 pt is unresponsive may not be weanable and may need trach peg and placement   .. 12/28/2022 will gemma Borges re trach and ct removal   Infection.   Possible aspn pneum 12/23/2022  Possible uti 12/23/2022   .. W 12/23- 12/24-12/25-12/26-12/27-12/28/2022   w 9.6 - 21 - 20- 13 - 14 - 12   .. pr 12/24-12/25/2022 pr 31- 24     .. ua 12/23/2022 w 26-50   .. ct cap 12/23 l chest tube sm l pntx chr small loculated r pl effs mod retropneumoperitonuem cw barotrauma   .. flu ab 12/23/2022 (-)  .. rsv 12/23/2022 (-)   .. bc 12/23 (-)   .. mrsa 12/23 (+)   .. 12/24 et pseudo  .. 12/23 uc klebsiella   .. 12/23 zosyn   .. 12/25/2022 Vanco 1 dose given by id   .. bsab follow cult   .. 12/24/2022 dw Dr Frazier To add vanco   Lacticemia.  .. la 12/23-12/24/2022 la 3.2  - 1.9   .. Fluids and monitor  CAD.  .. Tr 12/24-12/25/2022 Tr 259 -139  .. 12/23/2022 asa 81   .. monitor   CHF.  .. bnp 12/25-12/27/2022 70k - 51k   .. ho chf   .. chf meds to be reintroduced by cardio as allowed by bp   elevated lfts.  .. LFTS 12/23-12/24-12/25-12/26-12/27/2022       - 390-312- 306- 287     - 6874 - 1766- 670- 212      - 2066 - 2405 - 1824- 1041  .. 12/23/2022  ct cap pneumoretroperitoneum cw barotrauma   .. 12/23/2022 check hep profile   .. 12/23/2022 follow serially  .. elevcated lfts likely sec to anoxic hepatopathy during cac   DERRICK.  .. Cr 12/23-12/24-12/25-12/26-12/27-12/28/2022   Cr 1.7- 3.1- 4 - 4.5 - 4.2-4   .. uo 12/25-12/26/2022 400  - 500    .. fluids and serial monitoring  .. 12/25/2022 renal calld   .. 12/26/2022 seen Dr Escalante feels derrick sec ATN recommended d5w   Hypernatremia.  .. Na 12/24-12/25-12/26/2022 Na 147 - 148 - 147  .. 12/26/2022 iv changed to d5 50    AMS.  .. 12/25/2022 remains unresponsive   .. 12/25/2022 Nurse tells me that no sedation is being required   .. 12/23/2022 ct head(-)   .. 12/25/2022 neuro consultd   .. 12/26/2022 pt seen by Dr Hyatt To repeat ct in 72h    TIME SPENT   Over 39 minutes aggregate critical care time spent on encounter; activities included   direct patient care, counseling and/or coordinating care reviewing notes, lab data/ imaging , discussion with multidisciplinary team/ patient  /family and explaining in detail risks, benefits, alternatives  of the recommendations     BIPIN DE LA CRUZ 61 m 12/23/2022 1962 DR KELVIN VANESSA

## 2022-12-28 NOTE — PROGRESS NOTE ADULT - SUBJECTIVE AND OBJECTIVE BOX
Patient is a 62y old  Male who presents with a chief complaint of resp failure (28 Dec 2022 19:43)      BRIEF HOSPITAL COURSE: 63 y/o M with a h/o HTN, dilated cardiomyopathy s/p AICD, afib/flutter, CKD (baseline Cr ~1.4), former substance abuse, admitted on 12/23 with acute hypoxic respiratory failure requiring intubation. Post-intubation CXR with left sided PTX and patient subsequently developed PEA cardiac arrest. Emergent chest tube placed by ED physician. Hospital course complicated by aspiration pneumonia, shock, DERRICK on CKD, severe transaminitis, and persistent encephalopathy.     Events last 24 hours: Patient remains on full mechanical vent support. Poor mental status off sedation. Progressive DERRICK and uremia, although UOP remains acceptable. Left sided chest tube without air leak.        PAST MEDICAL & SURGICAL HISTORY:  Heart failure, systolic      CAD (coronary artery disease)      Hypertension      Nonischemic cardiomyopathy      COPD, moderate      2019 novel coronavirus disease (COVID-19)      Substance abuse      HLD (hyperlipidemia)      Cardiac LV ejection fraction 10-20%      AICD (automatic cardioverter/defibrillator) present      Cardiac LV ejection fraction 10-20%          Review of Systems:  Unable to obtain secondary to AMS.    Medications:  piperacillin/tazobactam IVPB.. 3.375 Gram(s) IV Intermittent every 12 hours  albuterol    90 MICROgram(s) HFA Inhaler 2 Puff(s) Inhalation every 6 hours  albuterol/ipratropium for Nebulization. 3 milliLiter(s) Nebulizer once  ipratropium 17 MICROgram(s) HFA Inhaler 1 Puff(s) Inhalation every 6 hours  fentaNYL    Injectable 100 MICROGram(s) IV Push every 4 hours PRN  aspirin  chewable 81 milliGRAM(s) Oral daily  heparin   Injectable 5000 Unit(s) SubCutaneous every 12 hours  lactulose Syrup 20 Gram(s) Oral two times a day  pantoprazole  Injectable 40 milliGRAM(s) IV Push daily  dextrose 50% Injectable 25 Gram(s) IV Push once  dextrose 50% Injectable 12.5 Gram(s) IV Push once  dextrose 50% Injectable 25 Gram(s) IV Push once  dextrose Oral Gel 15 Gram(s) Oral once PRN  glucagon  Injectable 1 milliGRAM(s) IntraMuscular once  insulin lispro (ADMELOG) corrective regimen sliding scale   SubCutaneous every 6 hours  dextrose 5%. 1000 milliLiter(s) IV Continuous <Continuous>  sodium chloride 0.45%. 1000 milliLiter(s) IV Continuous <Continuous>  chlorhexidine 0.12% Liquid 15 milliLiter(s) Oral Mucosa every 12 hours  mupirocin 2% Nasal 1 Application(s) Both Nostrils two times a day        Mode: AC/ CMV (Assist Control/ Continuous Mandatory Ventilation)  RR (machine): 20  TV (machine): 400  FiO2: 35  PEEP: 5  ITime: 1  MAP: 10  PIP: 26      ICU Vital Signs Last 24 Hrs  T(C): 36.5 (28 Dec 2022 22:51), Max: 36.8 (28 Dec 2022 04:00)  T(F): 97.7 (28 Dec 2022 22:51), Max: 98.3 (28 Dec 2022 04:00)  HR: 78 (28 Dec 2022 22:00) (78 - 98)  BP: 88/68 (28 Dec 2022 22:00) (88/68 - 134/87)  BP(mean): 75 (28 Dec 2022 22:00) (75 - 102)  ABP: --  ABP(mean): --  RR: 20 (28 Dec 2022 22:00) (19 - 22)  SpO2: 99% (28 Dec 2022 22:00) (97% - 100%)    O2 Parameters below as of 28 Dec 2022 22:00  Patient On (Oxygen Delivery Method): ventilator    O2 Concentration (%): 35            I&O's Detail    27 Dec 2022 07:01  -  28 Dec 2022 07:00  --------------------------------------------------------  IN:    dextrose 5%: 600 mL    Enteral Tube Flush: 660 mL    IV PiggyBack: 100 mL    Lactated Ringers: 800 mL    Nepro with Carb Steady: 880 mL    sodium chloride 0.45%: 450 mL  Total IN: 3490 mL    OUT:    Chest Tube (mL): 0 mL    Indwelling Catheter - Urethral (mL): 1100 mL  Total OUT: 1100 mL    Total NET: 2390 mL      28 Dec 2022 07:01  -  28 Dec 2022 22:58  --------------------------------------------------------  IN:    Enteral Tube Flush: 120 mL    Nepro with Carb Steady: 160 mL    sodium chloride 0.45%: 300 mL  Total IN: 580 mL    OUT:  Total OUT: 0 mL    Total NET: 580 mL            LABS:                        15.6   12.51 )-----------( 136      ( 28 Dec 2022 06:40 )             49.6     12-28    144  |  108  |  127<H>  ----------------------------<  179<H>  4.5   |  27  |  4.04<H>    Ca    8.9      28 Dec 2022 06:40  Phos  4.4     12-27  Mg     5.8     12-27    TPro  6.1  /  Alb  2.4<L>  /  TBili  1.5<H>  /  DBili  x   /  AST  117<H>  /  ALT  735<H>  /  AlkPhos  326<H>  12-28      CARDIAC MARKERS ( 27 Dec 2022 06:36 )  x     / x     / 40 U/L / x     / 0.5 ng/mL      CAPILLARY BLOOD GLUCOSE      POCT Blood Glucose.: 163 mg/dL (28 Dec 2022 17:14)        CULTURES:  Culture Results:   Rare Pseudomonas aeruginosa  Normal Respiratory Cathy absent (12-24-22 @ 17:43)  Culture Results:   No Growth Final (12-23-22 @ 14:06)  Culture Results:   >100,000 CFU/ml Klebsiella oxytoca/Raoultella ornithinolytica (12-23-22 @ 11:16)  Culture Results:   No Growth Final (12-23-22 @ 10:17)        Physical Examination:    General: No acute distress.  unresponsive, intubated    HEENT: Pupils equal, reactive to light.  Symmetric.    PULM: Clear to auscultation bilaterally, no significant sputum production, (+)left sided chest tube    CVS: Regular rate and rhythm, no murmurs, rubs, or gallops    ABD: Soft, nondistended, nontender, normoactive bowel sounds, no masses    EXT: No edema, nontender    SKIN: Warm and well perfused, no rashes noted.    NEURO: periodically opens eyes, does not track, does not follow commands, minimal withdrawal to tactile stimuli in extremities, no purposeful movements        RADIOLOGY:     < from: Xray Chest 1 View-PORTABLE IMMEDIATE (Xray Chest 1 View-PORTABLE IMMEDIATE .) (12.27.22 @ 15:39) >  Heart enlargement and left-sided defibrillator again noted.    Endotracheal tube and nasogastric tube remain.    There is a persistent loculated small right effusion with adjacent   atelectasis.    Chest is similar to December 26.    IMPRESSION: Unchanged chest as above.          CRITICAL CARE TIME SPENT: 35 mins  Time spent evaluating/treating patient with medical issues that pose a high risk for life threatening deterioration and/or end-organ damage, reviewing data/labs/imaging, discussing case with multidisciplinary team, discussing plan/goals of care with patient/family. Non-inclusive of procedure time.

## 2022-12-28 NOTE — PROGRESS NOTE ADULT - ASSESSMENT
62M CAD, Dilated cardiomyopathy, S/p AICD, Afib/flutter, h/o substance abuse,  presented with acute hypoxic and hypercarbic respiratory failure associated with pneumothorax.      ptx  resp failure  arnoldo hx  CM  AICD  AF    s/p ptx  s/p intubation  lung protective vent support  spoke with friend -  Meal - no HCP known - no family as per friend  plan for CT   vs noted  labs reviewed    cxr noted  labs reviewed  vs noted    oral and skin care  I and O  monitor VS and HD  CT in place -   emp ABX  COPD management  SW eval and follow up  assist with needs  monitor for needs - pain - sx -   [facility] care

## 2022-12-28 NOTE — PROGRESS NOTE ADULT - SUBJECTIVE AND OBJECTIVE BOX
[facility], Division of Infectious Diseases  MARINA Mccray Y. Patel, S. Shah, G. [facility]  [phone]    Name: DOROTHY VOSS  Age: 62y  Gender: Male  MRN: [id number]    Interval History:  Patient seen and examined at bedside in [facility]  Afebrile  Notes reviewed    Antibiotics:  piperacillin/tazobactam IVPB.. 3.375 Gram(s) IV Intermittent every 12 hours      Medications:  albuterol    90 MICROgram(s) HFA Inhaler 2 Puff(s) Inhalation every 6 hours  albuterol/ipratropium for Nebulization. 3 milliLiter(s) Nebulizer once  aspirin  chewable 81 milliGRAM(s) Oral daily  chlorhexidine 0.12% Liquid 15 milliLiter(s) Oral Mucosa every 12 hours  dextrose 5%. 1000 milliLiter(s) IV Continuous <Continuous>  dextrose 50% Injectable 25 Gram(s) IV Push once  dextrose 50% Injectable 12.5 Gram(s) IV Push once  dextrose 50% Injectable 25 Gram(s) IV Push once  dextrose Oral Gel 15 Gram(s) Oral once PRN  fentaNYL    Injectable 100 MICROGram(s) IV Push every 4 hours PRN  glucagon  Injectable 1 milliGRAM(s) IntraMuscular once  heparin   Injectable 5000 Unit(s) SubCutaneous every 12 hours  insulin lispro (ADMELOG) corrective regimen sliding scale   SubCutaneous every 6 hours  ipratropium 17 MICROgram(s) HFA Inhaler 1 Puff(s) Inhalation every 6 hours  lactulose Syrup 20 Gram(s) Oral two times a day  mupirocin 2% Nasal 1 Application(s) Both Nostrils two times a day  pantoprazole  Injectable 40 milliGRAM(s) IV Push daily  piperacillin/tazobactam IVPB.. 3.375 Gram(s) IV Intermittent every 12 hours  sodium chloride 0.45%. 1000 milliLiter(s) IV Continuous <Continuous>      Review of Systems:  unable to obtain  Allergies: No Known Allergies    For details regarding the patient's past medical history, social history, family history, and other miscellaneous elements, please refer the initial infectious diseases consultation and/or the admitting history and physical examination for this admission.    Objective:  Vitals:   T(C): 36.1 (12-28-22 @ 08:14), Max: 36.9 (12-27-22 @ 15:34)  HR: 96 (12-28-22 @ 11:10) (76 - 108)  BP: 115/83 (12-28-22 @ 08:00) (94/75 - 137/101)  RR: 20 (12-28-22 @ 08:00) (11 - 22)  SpO2: 99% (12-28-22 @ 11:10) (96% - 100%)    Physical Examination:  General: elderly M, intubated  HEENT: NC/AT, ETT in place  Lungs: MV breath sounds  Chest: L CT in place  Heart: RRR  Abdomen: Soft.   Extremities: No cyanosis or clubbing. No edema.       Laboratory Studies:  CBC:                       15.6   12.51 )-----------( 136      ( 28 Dec 2022 06:40 )             49.6     CMP: 12-28    144  |  108  |  127<H>  ----------------------------<  179<H>  4.5   |  27  |  4.04<H>    Ca    8.9      28 Dec 2022 06:40  Phos  4.4     12-27  Mg     5.8     12-27    TPro  6.1  /  Alb  2.4<L>  /  TBili  1.5<H>  /  DBili  x   /  AST  117<H>  /  ALT  735<H>  /  AlkPhos  326<H>  12-28    LIVER FUNCTIONS - ( 28 Dec 2022 06:40 )  Alb: 2.4 g/dL / Pro: 6.1 g/dL / ALK PHOS: 326 U/L / ALT: 735 U/L DA / AST: 117 U/L / GGT: x               Microbiology: reviewed    Culture - Sputum (collected 12-24-22 @ 17:43)  Source: ET Tube ET Tube  Gram Stain (12-25-22 @ 07:58):    Numerous polymorphonuclear leukocytes per low power field    No Squamous epithelial cells per low power field    Rare Gram Positive Rods seen per oil power field  Final Report (12-26-22 @ 16:49):    Rare Pseudomonas aeruginosa    Normal Respiratory Cathy absent  Organism: Pseudomonas aeruginosa (12-26-22 @ 16:49)  Organism: Pseudomonas aeruginosa (12-26-22 @ 16:49)      -  Amikacin: S <=16      -  Aztreonam: S 8      -  Cefepime: S <=2      -  Ceftazidime: S 4      -  Ciprofloxacin: S <=0.25      -  Gentamicin: S 4      -  Imipenem: S <=1      -  Levofloxacin: S <=0.5      -  Meropenem: S <=1      -  Piperacillin/Tazobactam: S <=8      -  Tobramycin: S <=2      Method Type: BAY    Culture - Blood (collected 12-23-22 @ 14:06)  Source: .Blood Blood-Peripheral  Preliminary Report (12-24-22 @ 19:01):    No growth to date.    Culture - Urine (collected 12-23-22 @ 11:16)  Source: Clean Catch Clean Catch (Midstream)  Final Report (12-26-22 @ 07:02):    >100,000 CFU/ml Klebsiella oxytoca/Raoultella ornithinolytica  Organism: Klebsiella oxytoca /Raoutella ornithinolytica (12-26-22 @ 07:02)  Organism: Klebsiella oxytoca /Raoutella ornithinolytica (12-26-22 @ 07:02)      -  Amikacin: S <=16      -  Amoxicillin/Clavulanic Acid: S <=8/4      -  Ampicillin: R 16 These ampicillin results predict results for amoxicillin      -  Ampicillin/Sulbactam: S <=4/2 Enterobacter, Klebsiella aerogenes, Citrobacter, and Serratia may develop resistance during prolonged therapy (3-4 days)      -  Aztreonam: S <=4      -  Cefazolin: S <=2      -  Cefepime: S <=2      -  Cefoxitin: S <=8      -  Ceftriaxone: S <=1 Enterobacter, Klebsiella aerogenes, Citrobacter, and Serratia may develop resistance during prolonged therapy      -  Ciprofloxacin: S <=0.25      -  Ertapenem: S <=0.5      -  Gentamicin: S <=2      -  Imipenem: S <=1      -  Levofloxacin: S <=0.5      -  Meropenem: S <=1      -  Nitrofurantoin: S <=32 Should not be used to treat pyelonephritis      -  Piperacillin/Tazobactam: S <=8      -  Tobramycin: S <=2      -  Trimethoprim/Sulfamethoxazole: S <=0.5/9.5      Method Type: BAY    Culture - Blood (collected 12-23-22 @ 10:17)  Source: .Blood Blood-Peripheral  Preliminary Report (12-24-22 @ 18:01):    No growth to date.          Radiology: reviewed

## 2022-12-28 NOTE — PROGRESS NOTE ADULT - SUBJECTIVE AND OBJECTIVE BOX
Patient is a 62y Male with a known history of :    HPI:  62M CAD, Dilated cardiomyopathy, S/p AICD, Afib/flutter, h/o substance abuse, CKD baseline creat approx 1.4 last admitted to [facility] with MSSA bacteremia, and CHF.  Sent from [facility]F with acute hypoxic respiratory failure.  Was initially on BiPAP then became hypoxic.  Anesthesia intubated patient.  On post intubation Xray pneumothorax evident.  Patient with PEA arrest.  Chest tube placed by ED physician.  ROSC obtained.     Patient unable to give further history.     (23 Dec 2022 13:02)      REVIEW OF SYSTEMS:    CONSTITUTIONAL: No fever, weight loss, or fatigue  EYES: No eye pain, visual disturbances, or discharge  ENMT:  No difficulty hearing, tinnitus, vertigo; No sinus or throat pain  NECK: No pain or stiffness  BREASTS: No pain, masses, or nipple discharge  RESPIRATORY: No cough, wheezing, chills or hemoptysis; No shortness of breath  CARDIOVASCULAR: No chest pain, palpitations, dizziness, or leg swelling  GASTROINTESTINAL: No abdominal or epigastric pain. No nausea, vomiting, or hematemesis; No diarrhea or constipation. No melena or hematochezia.  GENITOURINARY: No dysuria, frequency, hematuria, or incontinence  NEUROLOGICAL: No headaches, memory loss, loss of strength, numbness, or tremors  SKIN: No itching, burning, rashes, or lesions   LYMPH NODES: No enlarged glands  ENDOCRINE: No heat or cold intolerance; No hair loss  MUSCULOSKELETAL: No joint pain or swelling; No muscle, back, or extremity pain  PSYCHIATRIC: No depression, anxiety, mood swings, or difficulty sleeping  HEME/LYMPH: No easy bruising, or bleeding gums  ALLERGY AND IMMUNOLOGIC: No hives or eczema    MEDICATIONS  (STANDING):  albuterol    90 MICROgram(s) HFA Inhaler 2 Puff(s) Inhalation every 6 hours  albuterol/ipratropium for Nebulization. 3 milliLiter(s) Nebulizer once  aspirin  chewable 81 milliGRAM(s) Oral daily  chlorhexidine 0.12% Liquid 15 milliLiter(s) Oral Mucosa every 12 hours  dextrose 5%. 1000 milliLiter(s) (100 mL/Hr) IV Continuous <Continuous>  dextrose 50% Injectable 25 Gram(s) IV Push once  dextrose 50% Injectable 12.5 Gram(s) IV Push once  dextrose 50% Injectable 25 Gram(s) IV Push once  glucagon  Injectable 1 milliGRAM(s) IntraMuscular once  heparin   Injectable 5000 Unit(s) SubCutaneous every 12 hours  insulin lispro (ADMELOG) corrective regimen sliding scale   SubCutaneous every 6 hours  ipratropium 17 MICROgram(s) HFA Inhaler 1 Puff(s) Inhalation every 6 hours  lactulose Syrup 20 Gram(s) Oral two times a day  mupirocin 2% Nasal 1 Application(s) Both Nostrils two times a day  pantoprazole  Injectable 40 milliGRAM(s) IV Push daily  piperacillin/tazobactam IVPB.. 3.375 Gram(s) IV Intermittent every 12 hours  sodium chloride 0.45%. 1000 milliLiter(s) (75 mL/Hr) IV Continuous <Continuous>    MEDICATIONS  (PRN):  dextrose Oral Gel 15 Gram(s) Oral once PRN Blood Glucose LESS THAN 70 milliGRAM(s)/deciliter  fentaNYL    Injectable 100 MICROGram(s) IV Push every 4 hours PRN MV synchr      ALLERGIES: No Known Allergies      FAMILY HISTORY:  FH: lung cancer        Social history:  Alochol:   Smoking:   Drug Use:   Marital Status:     PHYSICAL EXAMINATION:  -----------------------------  T(C): 36.1 (12-28-22 @ 08:14), Max: 36.9 (12-27-22 @ 15:34)  HR: 96 (12-28-22 @ 11:10) (78 - 108)  BP: 115/83 (12-28-22 @ 08:00) (94/75 - 137/101)  RR: 20 (12-28-22 @ 08:00) (11 - 22)  SpO2: 99% (12-28-22 @ 11:10) (96% - 100%)  Wt(kg): --    12-27 @ 07:01  -  12-28 @ 07:00  --------------------------------------------------------  IN:    dextrose 5%: 600 mL    Enteral Tube Flush: 660 mL    IV PiggyBack: 100 mL    Lactated Ringers: 800 mL    Nepro with Carb Steady: 880 mL    sodium chloride 0.45%: 450 mL  Total IN: 3490 mL    OUT:    Chest Tube (mL): 0 mL    Indwelling Catheter - Urethral (mL): 1100 mL  Total OUT: 1100 mL    Total NET: 2390 mL            Constitutional: well developed, normal appearance, well groomed, well nourished, no deformities and no acute distress.   Eyes: the conjunctiva exhibited no abnormalities and the eyelids demonstrated no xanthelasmas.   HEENT: normal oral mucosa, no oral pallor and no oral cyanosis.   Neck: normal jugular venous A waves present, normal jugular venous V waves present and no jugular venous malone A waves.   Pulmonary: no respiratory distress, normal respiratory rhythm and effort, no accessory muscle use and lungs were clear to auscultation bilaterally. Anteriorly  Cardiovascular: heart rate and rhythm were normal, normal S1 and S2 and no murmur, gallop, rub, heave or thrill are present.   Musculoskeletal: the gait could not be assessed.   Extremities: no clubbing of the fingernails, no localized cyanosis, no petechial hemorrhages and no ischemic changes.   Skin: normal skin color and pigmentation, no rash, no venous stasis, no skin lesions, no skin ulcer and no xanthoma was observed.     LABS:   --------  12-28    144  |  108  |  127<H>  ----------------------------<  179<H>  4.5   |  27  |  4.04<H>    Ca    8.9      28 Dec 2022 06:40  Phos  4.4     12-27  Mg     5.8     12-27    TPro  6.1  /  Alb  2.4<L>  /  TBili  1.5<H>  /  DBili  x   /  AST  117<H>  /  ALT  735<H>  /  AlkPhos  326<H>  12-28                         15.6   12.51 )-----------( 136      ( 28 Dec 2022 06:40 )             49.6                   Radiology:

## 2022-12-28 NOTE — PROGRESS NOTE ADULT - ASSESSMENT
Pt is a 62M CAD, Dilated cardiomyopathy, S/p AICD, Afib/flutter, h/o substance abuse, CKD baseline creat approx 1.4 last admitted to [facility] with MSSA bacteremia, and CHF.    Sent from [facility] SNF with acute hypoxic respiratory failure. Was initially on BiPAP then became hypoxic.  Anesthesia intubated patient.  On post intubation Xray pneumothorax evident.  Patient with PEA arrest.  Chest tube placed by ED physician.  ROSC obtained.       S/p PEA arrest  AHRF requiring intubation  ?Aspiration PNA   Loculated pleural effusions  - imaging reviewed as above  - CT reviewed  - BCx NGTD  - Sputum Cx -- Pseudomonas  - UCx -- Klebsiella  - susceptibilities reviewed  - c/w zosyn, plan x7-10 day course  - can hold additional vancomycin  - trend temps/WBC--elevated but stable  - maintain aspiration precautions  - ICU care    GOC per primary team    Covering Dr. Phan  Infectious Diseases will continue to follow. Please call with any questions.   Shila Frazier M.D.  [facility] Division of Infectious Diseases [phone]

## 2022-12-28 NOTE — PROGRESS NOTE ADULT - SUBJECTIVE AND OBJECTIVE BOX
[facility]HY V[facility]    Allergies    No Known Allergies    Intolerances    pork (Other)      PAST MEDICAL & SURGICAL HISTORY:  Heart failure, systolic      CAD (coronary artery disease)      Hypertension      Nonischemic cardiomyopathy      COPD, moderate      2019 novel coronavirus disease (COVID-19)      Substance abuse      HLD (hyperlipidemia)      Cardiac LV ejection fraction 10-20%      AICD (automatic cardioverter/defibrillator) present      Cardiac LV ejection fraction 10-20%          FAMILY HISTORY:  FH: lung cancer        Home Medications:  acetaminophen 325 mg oral tablet: 2 tab(s) orally every 6 hours, As needed, Temp greater or equal to 38C (100.4F), Mild Pain (1 - 3) (23 Dec 2022 10:07)  apixaban 5 mg oral tablet: 1 tab(s) orally every 12 hours (23 Dec 2022 10:07)  Aquaphor Healing topical ointment: Apply topically to affected area once a day (23 Dec 2022 10:07)  ascorbic acid 500 mg oral tablet: 1 tab(s) orally once a day (23 Dec 2022 10:07)  Bacid (LAC) oral capsule: 2 cap(s) orally once a day (23 Dec 2022 10:07)  bumetanide 2 mg oral tablet: 1 tab(s) orally 2 times a day (23 Dec 2022 10:07)  gabapentin 100 mg oral capsule: 1 cap(s) orally 3 times a day (23 Dec 2022 10:07)  melatonin 3 mg oral tablet: 1 tab(s) orally once a day (at bedtime), As needed, Insomnia (23 Dec 2022 10:07)  Multiple Vitamins with Minerals oral tablet: 1 tab(s) orally once a day (23 Dec 2022 10:07)  pantoprazole 40 mg oral delayed release tablet: 1 tab(s) orally once a day (before a meal) (23 Dec 2022 10:07)  sacubitril-valsartan 24 mg-26 mg oral tablet: 1 tab(s) orally 2 times a day (23 Dec 2022 10:07)  simethicone 80 mg oral tablet: 2 tab(s) orally every 6 hours, As Needed (23 Dec 2022 10:07)  spironolactone 25 mg oral tablet: 1 tab(s) orally once a day (23 Dec 2022 10:07)  Symbicort 160 mcg-4.5 mcg/inh inhalation aerosol: 2 puff(s) inhaled 2 times a day (23 Dec 2022 10:07)  Ventolin HFA 90 mcg/inh inhalation aerosol: 2 puff(s) inhaled every 6 hours, As Needed (23 Dec 2022 10:07)      MEDICATIONS  (STANDING):  albuterol    90 MICROgram(s) HFA Inhaler 2 Puff(s) Inhalation every 6 hours  albuterol/ipratropium for Nebulization. 3 milliLiter(s) Nebulizer once  aspirin  chewable 81 milliGRAM(s) Oral daily  chlorhexidine 0.12% Liquid 15 milliLiter(s) Oral Mucosa every 12 hours  dextrose 5%. 1000 milliLiter(s) (100 mL/Hr) IV Continuous <Continuous>  dextrose 50% Injectable 25 Gram(s) IV Push once  dextrose 50% Injectable 12.5 Gram(s) IV Push once  dextrose 50% Injectable 25 Gram(s) IV Push once  glucagon  Injectable 1 milliGRAM(s) IntraMuscular once  heparin   Injectable 5000 Unit(s) SubCutaneous every 12 hours  insulin lispro (ADMELOG) corrective regimen sliding scale   SubCutaneous every 6 hours  ipratropium 17 MICROgram(s) HFA Inhaler 1 Puff(s) Inhalation every 6 hours  lactulose Syrup 20 Gram(s) Oral two times a day  mupirocin 2% Nasal 1 Application(s) Both Nostrils two times a day  pantoprazole  Injectable 40 milliGRAM(s) IV Push daily  piperacillin/tazobactam IVPB.. 3.375 Gram(s) IV Intermittent every 12 hours  sodium chloride 0.45%. 1000 milliLiter(s) (75 mL/Hr) IV Continuous <Continuous>    MEDICATIONS  (PRN):  dextrose Oral Gel 15 Gram(s) Oral once PRN Blood Glucose LESS THAN 70 milliGRAM(s)/deciliter  fentaNYL    Injectable 100 MICROGram(s) IV Push every 4 hours PRN MV synchr      Diet, NPO with Tube Feed:   Tube Feeding Modality: Nasogastric  Nepro with Carb Steady  Total Volume for 24 Hours (mL): 960  Continuous  Starting Tube Feed Rate mL per Hour: 20  Increase Tube Feed Rate by (mL): 10     Every 4 hours  Until Goal Tube Feed Rate (mL per Hour): 40  Tube Feed Duration (in Hours): 24  Tube Feed Start Time: 13:00  Free Water Flush  Pump   Rate (mL per Hour): 30 (12-26-22 @ 23:12) [Active]          Vital Signs Last 24 Hrs  T(C): 36.8 (28 Dec 2022 04:00), Max: 36.9 (27 Dec 2022 15:34)  T(F): 98.3 (28 Dec 2022 04:00), Max: 98.4 (27 Dec 2022 15:34)  HR: 80 (28 Dec 2022 06:00) (76 - 108)  BP: 94/75 (28 Dec 2022 06:00) (94/75 - 137/101)  BP(mean): 83 (28 Dec 2022 06:00) (77 - 114)  RR: 20 (28 Dec 2022 06:00) (11 - 27)  SpO2: 99% (28 Dec 2022 06:00) (96% - 100%)    Parameters below as of 28 Dec 2022 06:00  Patient On (Oxygen Delivery Method): ventilator    O2 Concentration (%): 35      12-27-22 @ 07:01  -  12-28-22 @ 07:00  --------------------------------------------------------  IN: 3490 mL / OUT: 1100 mL / NET: 2390 mL        Mode: AC/ CMV (Assist Control/ Continuous Mandatory Ventilation), RR (machine): 20, TV (machine): 400, FiO2: 35, PEEP: 5, ITime: 1, MAP: 11, PIP: 28      LABS:                        15.6   12.51 )-----------( 136      ( 28 Dec 2022 06:40 )             49.6     12-28    144  |  108  |  127<H>  ----------------------------<  179<H>  4.5   |  27  |  4.04<H>    Ca    8.9      28 Dec 2022 06:40  Phos  4.4     12-27  Mg     5.8     12-27    TPro  6.1  /  Alb  2.4<L>  /  TBili  1.5<H>  /  DBili  x   /  AST  117<H>  /  ALT  735<H>  /  AlkPhos  326<H>  12-28              WBC:  WBC Count: 12.51 K/uL (12-28 @ 06:40)  WBC Count: 14.34 K/uL (12-27 @ 06:36)  WBC Count: 13.56 K/uL (12-26 @ 06:46)  WBC Count: 20.28 K/uL (12-25 @ 06:27)      MICROBIOLOGY:  RECENT CULTURES:  12-24 ET Tube ET Tube Pseudomonas aeruginosa   Numerous polymorphonuclear leukocytes per low power field  No Squamous epithelial cells per low power field  Rare Gram Positive Rods seen per oil power field   Rare Pseudomonas aeruginosa  Normal Respiratory Cathy absent    12-23 .Blood Blood-Peripheral XXXX XXXX   No growth to date.    12-23 Clean Catch Clean Catch (Midstream) Klebsiella oxytoca /Raoutella ornithinolytica XXXX   >100,000 CFU/ml Klebsiella oxytoca/Raoultella ornithinolytica    12-23 .Blood Blood-Peripheral XXXX XXXX   No growth to date.          CARDIAC MARKERS ( 27 Dec 2022 06:36 )  x     / x     / 40 U/L / x     / 0.5 ng/mL            Sodium:  Sodium, Serum: 144 mmol/L (12-28 @ 06:40)  Sodium, Serum: 147 mmol/L (12-27 @ 06:36)  Sodium, Serum: 147 mmol/L (12-26 @ 06:46)  Sodium, Serum: 148 mmol/L (12-25 @ 06:27)      4.04 mg/dL 12-28 @ 06:40  4.20 mg/dL 12-27 @ 06:36  4.58 mg/dL 12-26 @ 06:46  4.04 mg/dL 12-25 @ 06:27      Hemoglobin:  Hemoglobin: 15.6 g/dL (12-28 @ 06:40)  Hemoglobin: 16.4 g/dL (12-27 @ 06:36)  Hemoglobin: 15.9 g/dL (12-26 @ 06:46)  Hemoglobin: 15.2 g/dL (12-25 @ 06:27)      Platelets: Platelet Count - Automated: 136 K/uL (12-28 @ 06:40)  Platelet Count - Automated: 157 K/uL (12-27 @ 06:36)  Platelet Count - Automated: 182 K/uL (12-26 @ 06:46)  Platelet Count - Automated: 176 K/uL (12-25 @ 06:27)      LIVER FUNCTIONS - ( 28 Dec 2022 06:40 )  Alb: 2.4 g/dL / Pro: 6.1 g/dL / ALK PHOS: 326 U/L / ALT: 735 U/L DA / AST: 117 U/L / GGT: x                 RADIOLOGY & ADDITIONAL STUDIES:      MICROBIOLOGY:  RECENT CULTURES:  12-24 ET Tube ET Tube Pseudomonas aeruginosa   Numerous polymorphonuclear leukocytes per low power field  No Squamous epithelial cells per low power field  Rare Gram Positive Rods seen per oil power field   Rare Pseudomonas aeruginosa  Normal Respiratory Cathy absent    12-23 .Blood Blood-Peripheral XXXX XXXX   No growth to date.    12-23 Clean Catch Clean Catch (Midstream) Klebsiella oxytoca /Raoutella ornithinolytica XXXX   >100,000 CFU/ml Klebsiella oxytoca/Raoultella ornithinolytica    12-23 .Blood Blood-Peripheral XXXX XXXX   No growth to date.

## 2022-12-28 NOTE — PROGRESS NOTE ADULT - SUBJECTIVE AND OBJECTIVE BOX
Date/Time Patient Seen:  		  Referring MD:   Data Reviewed	       Patient is a 62y old  Male who presents with a chief complaint of resp failure (27 Dec 2022 21:28)      Subjective/HPI     PAST MEDICAL & SURGICAL HISTORY:  Heart failure, systolic    CAD (coronary artery disease)    Hypertension    Nonischemic cardiomyopathy    COPD, moderate    2019 novel coronavirus disease (COVID-19)    Substance abuse    HLD (hyperlipidemia)    Cardiac LV ejection fraction 10-20%    No significant past surgical history    AICD (automatic cardioverter/defibrillator) present    Cardiac LV ejection fraction 10-20%          Medication list         MEDICATIONS  (STANDING):  albuterol    90 MICROgram(s) HFA Inhaler 2 Puff(s) Inhalation every 6 hours  albuterol/ipratropium for Nebulization. 3 milliLiter(s) Nebulizer once  aspirin  chewable 81 milliGRAM(s) Oral daily  chlorhexidine 0.12% Liquid 15 milliLiter(s) Oral Mucosa every 12 hours  dextrose 5%. 1000 milliLiter(s) (100 mL/Hr) IV Continuous <Continuous>  dextrose 50% Injectable 25 Gram(s) IV Push once  dextrose 50% Injectable 12.5 Gram(s) IV Push once  dextrose 50% Injectable 25 Gram(s) IV Push once  glucagon  Injectable 1 milliGRAM(s) IntraMuscular once  heparin   Injectable 5000 Unit(s) SubCutaneous every 12 hours  insulin lispro (ADMELOG) corrective regimen sliding scale   SubCutaneous every 6 hours  ipratropium 17 MICROgram(s) HFA Inhaler 1 Puff(s) Inhalation every 6 hours  lactulose Syrup 20 Gram(s) Oral two times a day  mupirocin 2% Nasal 1 Application(s) Both Nostrils two times a day  pantoprazole  Injectable 40 milliGRAM(s) IV Push daily  piperacillin/tazobactam IVPB.. 3.375 Gram(s) IV Intermittent every 12 hours  sodium chloride 0.45%. 1000 milliLiter(s) (75 mL/Hr) IV Continuous <Continuous>    MEDICATIONS  (PRN):  dextrose Oral Gel 15 Gram(s) Oral once PRN Blood Glucose LESS THAN 70 milliGRAM(s)/deciliter  fentaNYL    Injectable 100 MICROGram(s) IV Push every 4 hours PRN MV synchr         Vitals log        ICU Vital Signs Last 24 Hrs  T(C): 36.8 (28 Dec 2022 04:00), Max: 36.9 (27 Dec 2022 15:34)  T(F): 98.3 (28 Dec 2022 04:00), Max: 98.4 (27 Dec 2022 15:34)  HR: 80 (28 Dec 2022 06:00) (76 - 108)  BP: 94/75 (28 Dec 2022 06:00) (94/75 - 137/101)  BP(mean): 83 (28 Dec 2022 06:00) (77 - 114)  ABP: --  ABP(mean): --  RR: 20 (28 Dec 2022 06:00) (11 - 27)  SpO2: 99% (28 Dec 2022 06:00) (96% - 100%)    O2 Parameters below as of 28 Dec 2022 06:00  Patient On (Oxygen Delivery Method): ventilator    O2 Concentration (%): 35         Mode: AC/ CMV (Assist Control/ Continuous Mandatory Ventilation)  RR (machine): 20  TV (machine): 400  FiO2: 35  PEEP: 5  ITime: 1  MAP: 11  PIP: 28      Input and Output:  I&O's Detail    26 Dec 2022 07:01  -  27 Dec 2022 07:00  --------------------------------------------------------  IN:    dextrose 5%: 50 mL    dextrose 5% + sodium chloride 0.45%: 750 mL    Enteral Tube Flush: 360 mL    Glucerna 1.5: 600 mL    IV PiggyBack: 100 mL    Lactated Ringers: 1200 mL    Nepro with Carb Steady: 320 mL  Total IN: 3380 mL    OUT:    Chest Tube (mL): 0 mL    Indwelling Catheter - Urethral (mL): 1000 mL  Total OUT: 1000 mL    Total NET: 2380 mL      27 Dec 2022 07:01  -  28 Dec 2022 06:59  --------------------------------------------------------  IN:    dextrose 5%: 600 mL    Enteral Tube Flush: 660 mL    IV PiggyBack: 100 mL    Lactated Ringers: 800 mL    Nepro with Carb Steady: 880 mL    sodium chloride 0.45%: 450 mL  Total IN: 3490 mL    OUT:    Chest Tube (mL): 0 mL    Indwelling Catheter - Urethral (mL): 1100 mL  Total OUT: 1100 mL    Total NET: 2390 mL          Lab Data                        15.6   12.51 )-----------( 136      ( 28 Dec 2022 06:40 )             49.6     12-27    147<H>  |  109<H>  |  124<H>  ----------------------------<  198<H>  4.7   |  26  |  4.20<H>    Ca    8.7      27 Dec 2022 06:36  Phos  4.4     12-27  Mg     5.8     12-27    TPro  6.5  /  Alb  2.7<L>  /  TBili  2.1<H>  /  DBili  x   /  AST  212<H>  /  ALT  1041<H>  /  AlkPhos  287<H>  12-27      CARDIAC MARKERS ( 27 Dec 2022 06:36 )  x     / x     / 40 U/L / x     / 0.5 ng/mL        Review of Systems	      Objective     Physical Examination    heart s1s2  lung dc BS  head nc      Pertinent Lab findings & Imaging      Leo:  NO   Adequate UO     I&O's Detail    26 Dec 2022 07:01  -  27 Dec 2022 07:00  --------------------------------------------------------  IN:    dextrose 5%: 50 mL    dextrose 5% + sodium chloride 0.45%: 750 mL    Enteral Tube Flush: 360 mL    Glucerna 1.5: 600 mL    IV PiggyBack: 100 mL    Lactated Ringers: 1200 mL    Nepro with Carb Steady: 320 mL  Total IN: 3380 mL    OUT:    Chest Tube (mL): 0 mL    Indwelling Catheter - Urethral (mL): 1000 mL  Total OUT: 1000 mL    Total NET: 2380 mL      27 Dec 2022 07:01  -  28 Dec 2022 06:59  --------------------------------------------------------  IN:    dextrose 5%: 600 mL    Enteral Tube Flush: 660 mL    IV PiggyBack: 100 mL    Lactated Ringers: 800 mL    Nepro with Carb Steady: 880 mL    sodium chloride 0.45%: 450 mL  Total IN: 3490 mL    OUT:    Chest Tube (mL): 0 mL    Indwelling Catheter - Urethral (mL): 1100 mL  Total OUT: 1100 mL    Total NET: 2390 mL               Discussed with:     Cultures:	        Radiology

## 2022-12-28 NOTE — PROGRESS NOTE ADULT - SUBJECTIVE AND OBJECTIVE BOX
[facility] NEPHROLOGY SERVICES, [facility]  NEPHROLOGY AND HYPERTENSION  [address]  [address]  [place] [address]  [phone]    MD FREDY RAWLS MD ANDREY GONCHARUK, MD MADHU KORRAPATI, MD YELENA ROSENBERG, MD BINNY KOSHY, MD CHRISTOPHER CAPUTO, MD EDWARD BOVER, MD          Patient events noted    MEDICATIONS  (STANDING):  albuterol    90 MICROgram(s) HFA Inhaler 2 Puff(s) Inhalation every 6 hours  albuterol/ipratropium for Nebulization. 3 milliLiter(s) Nebulizer once  aspirin  chewable 81 milliGRAM(s) Oral daily  chlorhexidine 0.12% Liquid 15 milliLiter(s) Oral Mucosa every 12 hours  dextrose 5%. 1000 milliLiter(s) (100 mL/Hr) IV Continuous <Continuous>  dextrose 50% Injectable 25 Gram(s) IV Push once  dextrose 50% Injectable 12.5 Gram(s) IV Push once  dextrose 50% Injectable 25 Gram(s) IV Push once  glucagon  Injectable 1 milliGRAM(s) IntraMuscular once  heparin   Injectable 5000 Unit(s) SubCutaneous every 12 hours  insulin lispro (ADMELOG) corrective regimen sliding scale   SubCutaneous every 6 hours  ipratropium 17 MICROgram(s) HFA Inhaler 1 Puff(s) Inhalation every 6 hours  lactulose Syrup 20 Gram(s) Oral two times a day  mupirocin 2% Nasal 1 Application(s) Both Nostrils two times a day  pantoprazole  Injectable 40 milliGRAM(s) IV Push daily  piperacillin/tazobactam IVPB.. 3.375 Gram(s) IV Intermittent every 12 hours  sodium chloride 0.45%. 1000 milliLiter(s) (75 mL/Hr) IV Continuous <Continuous>    MEDICATIONS  (PRN):  dextrose Oral Gel 15 Gram(s) Oral once PRN Blood Glucose LESS THAN 70 milliGRAM(s)/deciliter  fentaNYL    Injectable 100 MICROGram(s) IV Push every 4 hours PRN  synchr      12-27-22 @ 07:01  -  12-28-22 @ 07:00  --------------------------------------------------------  IN: 3490 mL / OUT: 1100 mL / NET: 2390 mL      PHYSICAL EXAM:      T(C): 36.7 (12-28-22 @ 12:42), Max: 36.8 (12-28-22 @ 04:00)  HR: 82 (12-28-22 @ 18:06) (78 - 98)  BP: 91/72 (12-28-22 @ 17:00) (91/72 - 134/87)  RR: 20 (12-28-22 @ 17:00) (19 - 22)  SpO2: 99% (12-28-22 @ 18:06) (97% - 100%)  Wt(kg): --  Lungs clear  Heart S1S2  Abd soft NT ND  Extremities:   tr edema                                    15.6   12.51 )-----------( 136      ( 28 Dec 2022 06:40 )             49.6     12-28    144  |  108  |  127<H>  ----------------------------<  179<H>  4.5   |  27  |  4.04<H>    Ca    8.9      28 Dec 2022 06:40  Phos  4.4     12-27  Mg     5.8     12-27    TPro  6.1  /  Alb  2.4<L>  /  TBili  1.5<H>  /  DBili  x   /  AST  117<H>  /  ALT  735<H>  /  AlkPhos  326<H>  12-28      LIVER FUNCTIONS - ( 28 Dec 2022 06:40 )  Alb: 2.4 g/dL / Pro: 6.1 g/dL / ALK PHOS: 326 U/L / ALT: 735 U/L DA / AST: 117 U/L / GGT: x           Creatinine Trend: 4.04<--, 4.20<--, 4.58<--, 4.04<--, 3.19<--, 2.65<--  Mode: AC/ CMV (Assist Control/ Continuous Mandatory Ventilation)  RR (machine): 20  TV (machine): 400  FiO2: 35  PEEP: 5  ITime: 1  MAP: 10  PIP: 25    ASSESSMENT:  1.  DERRICK due to ischemic ATN - maintenance phase - non oliguric  2.  Acute respiratory failure and PEA arrest with ROSC  3.  Mild hypernatremia  4.  Status post large left sided tension pneumothorax  5.  Anoxic encephalopathy    PLAN:  IV D5W  No acute dialytic indications  Neuro prognosticate daily      Paddy Lockhart MD

## 2022-12-28 NOTE — PROGRESS NOTE ADULT - ASSESSMENT
The patient is a 62 year old male with a history of CAD, chronic systolic heart failure s/p ICD, atrial flutter s/p DCCV, substance abuse, CKD who is admitted with respiratory failure in the setting of tension pneumothorax complicated by cardiac arrest.    12/28/22  Seen at [facility] ICU  Intubated, sleeping comfortably  WBC-12.51  BUN/Creat-127/4.04  Troponin-74.4 trending down    Plan:  - ECG with sinus tachycardia and known LBBB  - Echo 2/22 with severely reduced LV (EF 10%) and RV function, mod MR, mod TR, mild pulm HTN  - Hold all heart failure medications for now (BB, Entresto, spironolactone, bumetanide)  - BP low normal  - Hold apixaban given DERRICK and likely will need additional procedures  - Continue aspirin 81 mg daily  - Chest tube in place  - IV antibiotics-zosyn, vancomycin  - Sedation  - Mechanical ventilation  - ICU care  - Being followed by Palliative care, Pulmonary, ID  - Overall very poor prognosis      35 minutes of critical care time spent with the patient and coordinating care with nursing, hospitalist, and consultants. Patient is critically ill requiring ICU care. The patient is high risk for deterioration and death.

## 2022-12-28 NOTE — PROGRESS NOTE ADULT - SUBJECTIVE AND OBJECTIVE BOX
Patient is a 62y old  Male who presents with a chief complaint of resp failure (26 Dec 2022 09:47)    HPI: 62M CAD, Dilated cardiomyopathy, S/p AICD, Afib/flutter, h/o substance abuse, CKD baseline creat approx 1.4 last admitted to [facility] with MSSA bacteremia, and CHF.  Sent from [facility] SNF with acute hypoxic respiratory failure.  Was initially on BiPAP then became hypoxic.  Anesthesia intubated patient.  On post intubation Xray pneumothorax evident.  Patient with PEA arrest.  Chest tube placed by ED physician.  ROSC obtained.     Patient unable to give further history.      Intetrval history -     Intubated on vent.  No seizure reported.    MEDICATIONS  (STANDING):    albuterol    90 MICROgram(s) HFA Inhaler 2 Puff(s) Inhalation every 6 hours  albuterol/ipratropium for Nebulization. 3 milliLiter(s) Nebulizer once  aspirin  chewable 81 milliGRAM(s) Oral daily  chlorhexidine 0.12% Liquid 15 milliLiter(s) Oral Mucosa every 12 hours  dextrose 5%. 1000 milliLiter(s) (100 mL/Hr) IV Continuous <Continuous>  dextrose 50% Injectable 25 Gram(s) IV Push once  dextrose 50% Injectable 12.5 Gram(s) IV Push once  dextrose 50% Injectable 25 Gram(s) IV Push once  glucagon  Injectable 1 milliGRAM(s) IntraMuscular once  heparin   Injectable 5000 Unit(s) SubCutaneous every 12 hours  insulin lispro (ADMELOG) corrective regimen sliding scale   SubCutaneous every 6 hours  ipratropium 17 MICROgram(s) HFA Inhaler 1 Puff(s) Inhalation every 6 hours  lactulose Syrup 20 Gram(s) Oral two times a day  mupirocin 2% Nasal 1 Application(s) Both Nostrils two times a day  pantoprazole  Injectable 40 milliGRAM(s) IV Push daily  piperacillin/tazobactam IVPB.. 3.375 Gram(s) IV Intermittent every 12 hours  sodium chloride 0.45%. 1000 milliLiter(s) (75 mL/Hr) IV Continuous <Continuous>    MEDICATIONS  (PRN):    dextrose Oral Gel 15 Gram(s) Oral once PRN Blood Glucose LESS THAN 70 milliGRAM(s)/deciliter  fentaNYL    Injectable 100 MICROGram(s) IV Push every 4 hours PRN MV synchr    Allergies    No Known Allergies    Intolerances    pork (Other)    REVIEW OF SYSTEMS: UTO    PHYSICAL EXAM:    Vital Signs Last 24 Hrs    T(C): 36.7 (12-28-22 @ 12:42), Max: 36.8 (12-28-22 @ 04:00)  T(F): 98 (12-28-22 @ 12:42), Max: 98.3 (12-28-22 @ 04:00)  HR: 82 (12-28-22 @ 14:21) (78 - 108)  BP: 97/66 (12-28-22 @ 13:00) (94/75 - 134/87)  BP(mean): 77 (12-28-22 @ 13:00) (77 - 106)  RR: 20 (12-28-22 @ 13:00) (19 - 22)  SpO2: 98% (12-28-22 @ 14:21) (96% - 100%)    O2 Concentration (%): 35    On Neurological Examination:    Intubated on vent.  Not on any sedation.  Doesn't respond responds to pain stimuli.  Pupils are 3 mm, sluggishly reacting to light.  Corneal reflexes are positive.  Gag reflex is positive  Neck is supple.  No abn body movements.    LABS:    CBC Full  -  ( 28 Dec 2022 06:40 )  WBC Count : 12.51 K/uL  RBC Count : 5.21 M/uL  Hemoglobin : 15.6 g/dL  Hematocrit : 49.6 %  Platelet Count - Automated : 136 K/uL  Mean Cell Volume : 95.2 fl  Mean Cell Hemoglobin : 29.9 pg  Mean Cell Hemoglobin Concentration : 31.5 gm/dL    12-28    144  |  108  |  127<H>  ----------------------------<  179<H>  4.5   |  27  |  4.04<H>    Ca    8.9      28 Dec 2022 06:40  Phos  4.4     12-27  Mg     5.8     12-27    TPro  6.1  /  Alb  2.4<L>  /  TBili  1.5<H>  /  DBili  x   /  AST  117<H>  /  ALT  735<H>  /  AlkPhos  326<H>  12-28         RADIOLOGY & ADDITIONAL STUDIES:    < from: CT Head No Cont (12.23.22 @ 21:18) >    IMPRESSION:    No large territory acute infarct, intracranial hemorrhage, or mass effect.    Slight progression of chronic microangiopathic white matter changes as compared to prior study from 2016.    < end of copied text >    < from: CT Chest No Cont (12.23.22 @ 21:19) >    IMPRESSION:    *  Left chest tube with residual small left pneumothorax. No evidence of tension.  *  Right basilar rounded atelectasis again noted with chronic small loculated right pleural effusion.  *  Small pneumomediastinum. Left chest and abdominal wall emphysema. Moderate pneumoretroperitoneum and properitoneal air. No pneumoperitoneum. Findings are compatible with barotrauma.  *  Evidence of urinary bladder cystitis.    < end of copied text >

## 2022-12-28 NOTE — PROGRESS NOTE ADULT - ASSESSMENT
63 y/o M with a h/o HTN, dilated cardiomyopathy s/p AICD, afib/flutter, CKD (baseline Cr ~1.4), former substance abuse, with:    # Cardiac arrest  # Suspected anoxic encephalopathy  # Left PTX  # Pseudomonas pneumonia  # Klebsiella/raoutella UTI  # DERRICK  # Shock liver    - actively titrating ventilator settings to maintain SpO2 > 92% and adequate minute ventilation  - chest tube well positioned without recurrent PTX on repeat CXR  - suspect significant anoxic brain injury based on clinical findings, will send for repeat CT brain in the AM to help prognosticate  - no urgent indication for hemodialysis, however may need to consider this to clear severe uremia () as this may skew neuro exam  - trend BUN/Cr, electrolytes, acid-base balance, and monitor UOP (nonoliguric)  - continue lactulose for mild hyperammonemia, LFTs slowly downtrending  - continue Zosyn, blood cultures negative for growth

## 2022-12-28 NOTE — PROGRESS NOTE ADULT - SUBJECTIVE AND OBJECTIVE BOX
Patient is a 62y old  Male who presents with a chief complaint of resp failure (28 Dec 2022 12:14)      INTERVAL HPI/OVERNIGHT EVENTS: no events.     MEDICATIONS  (STANDING):  albuterol    90 MICROgram(s) HFA Inhaler 2 Puff(s) Inhalation every 6 hours  albuterol/ipratropium for Nebulization. 3 milliLiter(s) Nebulizer once  aspirin  chewable 81 milliGRAM(s) Oral daily  chlorhexidine 0.12% Liquid 15 milliLiter(s) Oral Mucosa every 12 hours  dextrose 5%. 1000 milliLiter(s) (100 mL/Hr) IV Continuous <Continuous>  dextrose 50% Injectable 25 Gram(s) IV Push once  dextrose 50% Injectable 12.5 Gram(s) IV Push once  dextrose 50% Injectable 25 Gram(s) IV Push once  glucagon  Injectable 1 milliGRAM(s) IntraMuscular once  heparin   Injectable 5000 Unit(s) SubCutaneous every 12 hours  insulin lispro (ADMELOG) corrective regimen sliding scale   SubCutaneous every 6 hours  ipratropium 17 MICROgram(s) HFA Inhaler 1 Puff(s) Inhalation every 6 hours  lactulose Syrup 20 Gram(s) Oral two times a day  mupirocin 2% Nasal 1 Application(s) Both Nostrils two times a day  pantoprazole  Injectable 40 milliGRAM(s) IV Push daily  piperacillin/tazobactam IVPB.. 3.375 Gram(s) IV Intermittent every 12 hours  sodium chloride 0.45%. 1000 milliLiter(s) (75 mL/Hr) IV Continuous <Continuous>    MEDICATIONS  (PRN):  dextrose Oral Gel 15 Gram(s) Oral once PRN Blood Glucose LESS THAN 70 milliGRAM(s)/deciliter  fentaNYL    Injectable 100 MICROGram(s) IV Push every 4 hours PRN MV synchr      Allergies  No Known Allergies    Intolerances  pork (Other)      REVIEW OF SYSTEMS:  unable to obtain    Vital Signs Last 24 Hrs  T(C): 36.7 (28 Dec 2022 12:42), Max: 36.9 (27 Dec 2022 15:34)  T(F): 98 (28 Dec 2022 12:42), Max: 98.4 (27 Dec 2022 15:34)  HR: 82 (28 Dec 2022 14:21) (78 - 108)  BP: 97/66 (28 Dec 2022 13:00) (94/75 - 134/87)  BP(mean): 77 (28 Dec 2022 13:00) (77 - 106)  RR: 20 (28 Dec 2022 13:00) (18 - 22)  SpO2: 98% (28 Dec 2022 14:21) (96% - 100%)    Parameters below as of 28 Dec 2022 08:05  Patient On (Oxygen Delivery Method): ventilator    PHYSICAL EXAM:  GENERAL: NAD  HEAD:  Atraumatic, Normocephalic  EYES: conjunctiva and sclera clear  ENMT: intubated, ngt.   NECK: Supple  NERVOUS SYSTEM:  opens eyes at times, no other responses noted.   CHEST/LUNG: Clear to auscultation bilaterally;  chest tube left chest.   HEART: Regular rate and rhythm;   ABDOMEN: Soft, Nontender, Nondistended; Bowel sounds present  EXTREMITIES:  2+ Peripheral Pulses, No clubbing, cyanosis, or edema      LABS:                        15.6   12.51 )-----------( 136      ( 28 Dec 2022 06:40 )             49.6     28 Dec 2022 06:40    144    |  108    |  127    ----------------------------<  179    4.5     |  27     |  4.04     Ca    8.9        28 Dec 2022 06:40    TPro  6.1    /  Alb  2.4    /  TBili  1.5    /  DBili  x      /  AST  117    /  ALT  735    /  AlkPhos  326    28 Dec 2022 06:40        CAPILLARY BLOOD GLUCOSE  POCT Blood Glucose.: 144 mg/dL (28 Dec 2022 12:10)  POCT Blood Glucose.: 138 mg/dL (28 Dec 2022 06:47)  POCT Blood Glucose.: 167 mg/dL (28 Dec 2022 00:59)  POCT Blood Glucose.: 198 mg/dL (27 Dec 2022 17:39)      RADIOLOGY & ADDITIONAL TESTS:    Imaging Personally Reviewed:  [ ] YES     Consultant(s) Notes Reviewed:      Care Discussed with Consultants/Other Providers:    Advanced Directives: [ ] DNR  [ ] No feeding tube  [ ] MOLST in chart  [ ] MOLST completed today  [ ] Unknown

## 2022-12-28 NOTE — PROGRESS NOTE ADULT - ASSESSMENT
62M CAD, Dilated cardiomyopathy, S/p AICD, Afib/flutter, h/o substance abuse,  presented with acute hypoxic and hypercarbic respiratory failure associated with pneumothorax which lled to cardiac arrest       Acute respiratory failure secondary to tension pneumothorax with leading to cardiac arrest   - continue vent management  - continue chest tube to water seal, will d/w CT surgery plans for chest tube.   - CXR today does not show pneumo.   - continue on zosyn as per ID  - no wheezing noted  - will order to taper predisone     hypernatremia  - improved  - cont IVF and continue to monitor      Cardiomyopathy  - current cardiac issues appear to be secondary to pulm issues at this time  - hold medications for now   - hold eliquis pending possible further procedures and hospital course    DM2  - not on meds at SNF  - FS monitoring with lispro coverage scale while critically ill    DERRICK on CKD  - Likely ATN due to above, possible HF as well  - monitor creatinine,   - avoid nephrotoxic agents    Prophylactic measure  - DVT proph: heparin SQ for now  - GI proph: protonix    D/w Dr Buster EMANUEL, Dr Zapien

## 2022-12-28 NOTE — PROGRESS NOTE ADULT - ASSESSMENT
62M CAD, Dilated cardiomyopathy, S/p AICD, Afib/flutter, h/o substance abuse, CKD baseline creat approx 1.4 last admitted to [facility] with MSSA bacteremia, and CHF.  Sent from [facility] SNF with acute hypoxic respiratory failure/PEA arrest.  CT Head - No acute pathology.  Positive brain stem functions. Pt is breathing over the vent.   Hypoxic/Ischemic Encephalopathy. Only time could tell regarding the severity.   No seizure reported.  Would get Repeat CT head in AM.  Would follow.

## 2022-12-29 NOTE — PROGRESS NOTE ADULT - ASSESSMENT
Pt is a 62M CAD, Dilated cardiomyopathy, S/p AICD, Afib/flutter, h/o substance abuse, CKD baseline creat approx 1.4 last admitted to [facility] with MSSA bacteremia, and CHF.    Sent from [facility] SNF with acute hypoxic respiratory failure. Was initially on BiPAP then became hypoxic.  Anesthesia intubated patient.  On post intubation Xray pneumothorax evident.  Patient with PEA arrest.  Chest tube placed by ED physician.  ROSC obtained.       S/p PEA arrest  AHRF requiring intubation  ?Aspiration PNA   Loculated pleural effusions  - imaging reviewed as above  - CT reviewed  - BCx NGTD  - Sputum Cx -- Pseudomonas  - UCx -- Klebsiella  - susceptibilities reviewed  - c/w zosyn, plan x10 day course until 1/1/2023  - trend temps/WBC--elevated but stable  - maintain aspiration precautions  - ICU care    GOC per primary team    Covering Dr. Phan  Infectious Diseases will continue to follow. Please call with any questions.   Shila Frazier M.D.  \[facility]\"" Division of Infectious Diseases [phone]

## 2022-12-29 NOTE — PROGRESS NOTE ADULT - SUBJECTIVE AND OBJECTIVE BOX
DOROTHY VOSS    [facility]    Allergies    No Known Allergies    Intolerances    pork (Other)      PAST MEDICAL & SURGICAL HISTORY:  Heart failure, systolic      CAD (coronary artery disease)      Hypertension      Nonischemic cardiomyopathy      COPD, moderate      2019 novel coronavirus disease (COVID-19)      Substance abuse      HLD (hyperlipidemia)      Cardiac LV ejection fraction 10-20%      AICD (automatic cardioverter/defibrillator) present      Cardiac LV ejection fraction 10-20%          FAMILY HISTORY:  FH: lung cancer        Home Medications:  acetaminophen 325 mg oral tablet: 2 tab(s) orally every 6 hours, As needed, Temp greater or equal to 38C (100.4F), Mild Pain (1 - 3) (23 Dec 2022 10:07)  apixaban 5 mg oral tablet: 1 tab(s) orally every 12 hours (23 Dec 2022 10:07)  Aquaphor Healing topical ointment: Apply topically to affected area once a day (23 Dec 2022 10:07)  ascorbic acid 500 mg oral tablet: 1 tab(s) orally once a day (23 Dec 2022 10:07)  Bacid (LAC) oral capsule: 2 cap(s) orally once a day (23 Dec 2022 10:07)  bumetanide 2 mg oral tablet: 1 tab(s) orally 2 times a day (23 Dec 2022 10:07)  gabapentin 100 mg oral capsule: 1 cap(s) orally 3 times a day (23 Dec 2022 10:07)  melatonin 3 mg oral tablet: 1 tab(s) orally once a day (at bedtime), As needed, Insomnia (23 Dec 2022 10:07)  Multiple Vitamins with Minerals oral tablet: 1 tab(s) orally once a day (23 Dec 2022 10:07)  pantoprazole 40 mg oral delayed release tablet: 1 tab(s) orally once a day (before a meal) (23 Dec 2022 10:07)  sacubitril-valsartan 24 mg-26 mg oral tablet: 1 tab(s) orally 2 times a day (23 Dec 2022 10:07)  simethicone 80 mg oral tablet: 2 tab(s) orally every 6 hours, As Needed (23 Dec 2022 10:07)  spironolactone 25 mg oral tablet: 1 tab(s) orally once a day (23 Dec 2022 10:07)  Symbicort 160 mcg-4.5 mcg/inh inhalation aerosol: 2 puff(s) inhaled 2 times a day (23 Dec 2022 10:07)  Ventolin HFA 90 mcg/inh inhalation aerosol: 2 puff(s) inhaled every 6 hours, As Needed (23 Dec 2022 10:07)      MEDICATIONS  (STANDING):  albuterol    90 MICROgram(s) HFA Inhaler 2 Puff(s) Inhalation every 6 hours  albuterol/ipratropium for Nebulization. 3 milliLiter(s) Nebulizer once  aspirin  chewable 81 milliGRAM(s) Oral daily  chlorhexidine 0.12% Liquid 15 milliLiter(s) Oral Mucosa every 12 hours  dextrose 5%. 1000 milliLiter(s) (100 mL/Hr) IV Continuous <Continuous>  dextrose 50% Injectable 25 Gram(s) IV Push once  dextrose 50% Injectable 12.5 Gram(s) IV Push once  dextrose 50% Injectable 25 Gram(s) IV Push once  glucagon  Injectable 1 milliGRAM(s) IntraMuscular once  heparin   Injectable 5000 Unit(s) SubCutaneous every 12 hours  insulin lispro (ADMELOG) corrective regimen sliding scale   SubCutaneous every 6 hours  ipratropium 17 MICROgram(s) HFA Inhaler 1 Puff(s) Inhalation every 6 hours  lactulose Syrup 20 Gram(s) Oral two times a day  mupirocin 2% Nasal 1 Application(s) Both Nostrils two times a day  pantoprazole  Injectable 40 milliGRAM(s) IV Push daily  piperacillin/tazobactam IVPB.. 3.375 Gram(s) IV Intermittent every 12 hours  sodium chloride 0.45%. 1000 milliLiter(s) (75 mL/Hr) IV Continuous <Continuous>    MEDICATIONS  (PRN):  dextrose Oral Gel 15 Gram(s) Oral once PRN Blood Glucose LESS THAN 70 milliGRAM(s)/deciliter  fentaNYL    Injectable 100 MICROGram(s) IV Push every 4 hours PRN MV synchr      Diet, NPO with Tube Feed:   Tube Feeding Modality: Nasogastric  Nepro with Carb Steady  Total Volume for 24 Hours (mL): 960  Continuous  Starting Tube Feed Rate mL per Hour: 20  Increase Tube Feed Rate by (mL): 10     Every 4 hours  Until Goal Tube Feed Rate (mL per Hour): 40  Tube Feed Duration (in Hours): 24  Tube Feed Start Time: 13:00  Free Water Flush  Pump   Rate (mL per Hour): 30 (12-26-22 @ 23:12) [Active]          Vital Signs Last 24 Hrs  T(C): 36.1 (29 Dec 2022 08:30), Max: 36.7 (28 Dec 2022 12:42)  T(F): 97 (29 Dec 2022 08:30), Max: 98 (28 Dec 2022 12:42)  HR: 78 (29 Dec 2022 09:05) (78 - 96)  BP: 103/78 (29 Dec 2022 08:00) (88/68 - 134/86)  BP(mean): 86 (29 Dec 2022 08:00) (75 - 102)  RR: 14 (29 Dec 2022 08:00) (14 - 22)  SpO2: 98% (29 Dec 2022 09:05) (98% - 99%)    Parameters below as of 29 Dec 2022 09:05  Patient On (Oxygen Delivery Method): ventilator,35          12-28-22 @ 07:01  -  12-29-22 @ 07:00  --------------------------------------------------------  IN: 1840 mL / OUT: 350 mL / NET: 1490 mL        Mode: CPAP with PS, FiO2: 35, PEEP: 5, PS: 10, ITime: 1, MAP: 11, PIP: 25      LABS:                        15.6   12.51 )-----------( 136      ( 28 Dec 2022 06:40 )             49.6     12-28    144  |  108  |  127<H>  ----------------------------<  179<H>  4.5   |  27  |  4.04<H>    Ca    8.9      28 Dec 2022 06:40    TPro  6.1  /  Alb  2.4<L>  /  TBili  1.5<H>  /  DBili  x   /  AST  117<H>  /  ALT  735<H>  /  AlkPhos  326<H>  12-28              WBC:  WBC Count: 12.51 K/uL (12-28 @ 06:40)  WBC Count: 14.34 K/uL (12-27 @ 06:36)  WBC Count: 13.56 K/uL (12-26 @ 06:46)      MICROBIOLOGY:  RECENT CULTURES:  12-24 ET Tube ET Tube Pseudomonas aeruginosa   Numerous polymorphonuclear leukocytes per low power field  No Squamous epithelial cells per low power field  Rare Gram Positive Rods seen per oil power field   Rare Pseudomonas aeruginosa  Normal Respiratory Cathy absent    12-23 .Blood Blood-Peripheral XXXX XXXX   No Growth Final    12-23 Clean Catch Clean Catch (Midstream) Klebsiella oxytoca /Raoutella ornithinolytica XXXX   >100,000 CFU/ml Klebsiella oxytoca/Raoultella ornithinolytica    12-23 .Blood Blood-Peripheral XXXX XXXX   No Growth Final                    Sodium:  Sodium, Serum: 144 mmol/L (12-28 @ 06:40)  Sodium, Serum: 147 mmol/L (12-27 @ 06:36)  Sodium, Serum: 147 mmol/L (12-26 @ 06:46)      4.04 mg/dL 12-28 @ 06:40  4.20 mg/dL 12-27 @ 06:36  4.58 mg/dL 12-26 @ 06:46      Hemoglobin:  Hemoglobin: 15.6 g/dL (12-28 @ 06:40)  Hemoglobin: 16.4 g/dL (12-27 @ 06:36)  Hemoglobin: 15.9 g/dL (12-26 @ 06:46)      Platelets: Platelet Count - Automated: 136 K/uL (12-28 @ 06:40)  Platelet Count - Automated: 157 K/uL (12-27 @ 06:36)  Platelet Count - Automated: 182 K/uL (12-26 @ 06:46)      LIVER FUNCTIONS - ( 28 Dec 2022 06:40 )  Alb: 2.4 g/dL / Pro: 6.1 g/dL / ALK PHOS: 326 U/L / ALT: 735 U/L DA / AST: 117 U/L / GGT: x                 RADIOLOGY & ADDITIONAL STUDIES:      MICROBIOLOGY:  RECENT CULTURES:  12-24 ET Tube ET Tube Pseudomonas aeruginosa   Numerous polymorphonuclear leukocytes per low power field  No Squamous epithelial cells per low power field  Rare Gram Positive Rods seen per oil power field   Rare Pseudomonas aeruginosa  Normal Respiratory Cathy absent    12-23 .Blood Blood-Peripheral XXXX XXXX   No Growth Final    12-23 Clean Catch Clean Catch (Midstream) Klebsiella oxytoca /Raoutella ornithinolytica XXXX   >100,000 CFU/ml Klebsiella oxytoca/Raoultella ornithinolytica    12-23 .Blood Blood-Peripheral XXXX XXXX   No Growth Final

## 2022-12-29 NOTE — PROGRESS NOTE ADULT - SUBJECTIVE AND OBJECTIVE BOX
Chief Complaint: Respiratory failure    Interval Events: No events overnight.    Review of Systems:  Unable to obtain    Physical Exam:  Vitals:        Vital Signs Last 24 Hrs  T(C): 36.1 (29 Dec 2022 08:30), Max: 36.7 (28 Dec 2022 12:42)  T(F): 97 (29 Dec 2022 08:30), Max: 98 (28 Dec 2022 12:42)  HR: 78 (29 Dec 2022 09:05) (78 - 89)  BP: 103/78 (29 Dec 2022 08:00) (88/68 - 134/86)  BP(mean): 86 (29 Dec 2022 08:00) (75 - 102)  RR: 14 (29 Dec 2022 08:00) (14 - 22)  SpO2: 98% (29 Dec 2022 09:05) (98% - 99%)  Parameters below as of 29 Dec 2022 09:05  Patient On (Oxygen Delivery Method): ventilator,35  General: NAD  HEENT: MMM  Neck: No JVD, no carotid bruit  Lungs: CTAB  CV: RRR, nl S1/S2, no M/R/G  Abdomen: S/NT/ND, +BS  Extremities: No LE edema, no cyanosis  Neuro: AAOx0  Skin: No rash    Labs:                        15.6   12.51 )-----------( 136      ( 28 Dec 2022 06:40 )             49.6     12-28    144  |  108  |  127<H>  ----------------------------<  179<H>  4.5   |  27  |  4.04<H>    Ca    8.9      28 Dec 2022 06:40    TPro  6.1  /  Alb  2.4<L>  /  TBili  1.5<H>  /  DBili  x   /  AST  117<H>  /  ALT  735<H>  /  AlkPhos  326<H>  12-28            ECG/Telemetry: Sinus rhythm

## 2022-12-29 NOTE — PROGRESS NOTE ADULT - ASSESSMENT
BIPIN DE LA CRUZ 61 m 2/11/2022 1960 DR KELVIN VANESSA     REVIEW OF SYMPTOMS      Able to give (reliable) ROS  NO     PHYSICAL EXAM    HEENT Unremarkable  atraumatic   RESP Fair air entry EXP prolonged    Harsh breath sound Resp distres mild   CARDIAC S1 S2 No S3     NO JVD    ABDOMEN SOFT BS PRESENT NOT DISTENDED No hepatosplenomegaly   PEDAL EDEMA present No calf tenderness  NO rash       GENERAL DATA .   GOC.   12/23/2022 full code  ALLGY.     nka                  WT.     12/24/2022 81           BMI.       12/24/2022 26          ICU STAY. .. 12/23/2022  COVID. ..  12/23/2022 scv2 (-)   BEST PRACTICE ISSUES.    HOB ELEVATN. Yes  DVT PPLX. ..    12/23/2022 hpsc   WHITMORE PPLX. ..    12/23/2022 protonix 40   INFN PPLX. ..  12/23/2022 chlorhexidine .12%   SP SW VIVIAN.         DIET.  .. 12/26 nepro 960 ng    IV fl... ; 1/2 ns 75   PROCEDURES...   12/23/2022  l chest tube   12/23/2022 intubated   12/23/2022 reed     PAST PROCEDURES.  .  12/25/2022 glucerna 960 ng .     ABGS.  12/25/2022 ac 40% 743/44/108   12/24/2022 ac 60% 746/41/190   12/23/2022 11 a 100% 705/95/68     VS/ PO/IO/ VENT/ DRIPS.   12/29/2022 afeb 85 100/90   12/29/2022 ac 20/400/5/.35     PREV ADMISSION 2/11-2/25/2022 [facility]    AGE doa cc.  62 m doa 12/23/2022 resp distress    MAIN ISSUES.  CAC 12/23/2022 rosc 25 min   Intubation 12/23/2022  Vent mgmt 12/23/2022   sedation.  Pneumothorax... 12/23/2022 cxr tension pntx  Infection.  Possible aspn pneum 12/23/2022  Possible uti 12/23/2022   .. ua 12/23/2022 w 26-50   .. mrsa 12/23 (+)   .. 12/24 et pseudo  .. 12/23 uc klebsiella   .. 12/23 zosyn   Lacticemia.  elevated lfts.  DERRICK.  Hypernatremia.  Anoxic encephalo    PMH  PMH COPD  PMH COVID (+) 2/11/2022   PMH S aureus bacteremia mssa 2/11   .. Ancef 2/12/2022 Dr Phan  PMH AICD  PMH HFREF  .. ECHO 11/20/2021 ef 20%  PMH A fib (on eliquis)     HOME MEDS INCLUDE.  apixaba 5.2   sacubitril valsartan 24 26    bumetanide 2   spironolactone 25  protonix 40   amiodarone 200     PROBLEMS ASSESSMENT RECOMMENDATIONS.  Intubation 12/23/2022  Vent mgmnt.   .. bundle dsv dsbt ltvv pplat 30 (-) PO2 60 (+) ph 7.3 (+)  sedation.  .. 12/23/2022 fentanyl 100.4p   .. 12/26/2022 is not requiring sedation (anoxic encephalo)   Weaning.  .. 12/28/2022 failed cpap   COPD.  .. 12/23/2022 combivent .4     .. 12/23/2022 solumed 40.3 -> 12/24/2022 solumed 40   .. 12/23/2022 will taper to 40 on 12/24   .. cont rx  Code 12/23/2022    .. 12/23/2022 was  restless when he came in No defib  .. 12/23/2022 coded at 12.13 p pea arrest about 25 min rosc   .. 12/23/2022 monitor neuro recovery  .. 12/24/2022 eyes open gag (+) does not follow commands   Pneumothorax.  .. 12/23/2022 cxr tension pntx  .. 12/29/2022 ct ch unchanged small loculatd l pntx   decr abd gas and sc emphysema   .. 12/23/2022 chets tube inserted  .. cxr 12/24/2022 et tube ng tube cardiac device cm sm r effs and basal infiltr l chest tube npo pntx sc emphysema improvd   .. 12/24/2022 bleeding at chest tube insertion site and scant drainage   .. 12/24/2022 above chest tube issues dw Dr Borges and she felkt bleeding likely sec recent apixaba and no drainage likely sec to resolution of pntx  and advised observation   .. follow clinically and with imaging   .. 12/24/2022 plan is to wean off vent and then plan chest tube remoival   .. 12/26/2022 pt is unresponsive may not be weanable and may need trach peg and placement   .. 12/28/2022 will gemma Borges re trach and ct removal   Infection.   Possible aspn pneum 12/23/2022  Possible uti 12/23/2022   .. W 12/23- 12/24-12/25-12/26-12/27-12/28/2022   w 9.6 - 21 - 20- 13 - 14 - 12   .. pr 12/24-12/25/2022 pr 31- 24     .. ua 12/23/2022 w 26-50   .. ct cap 12/23 l chest tube sm l pntx chr small loculated r pl effs mod retropneumoperitonuem cw barotrauma   .. flu ab 12/23/2022 (-)  .. rsv 12/23/2022 (-)   .. bc 12/23 (-)   .. mrsa 12/23 (+)   .. 12/24 et pseudo  .. 12/23 uc klebsiella   .. 12/23 zosyn   .. 12/25/2022 Vanco 1 dose given by id   .. bsab follow cult   .. 12/24/2022 gemma Frazier To add vanco   Lacticemia.  .. la 12/23-12/24/2022 la 3.2  - 1.9   .. Fluids and monitor  CAD.  .. Tr 12/24-12/25/2022 Tr 259 -139  .. 12/23/2022 asa 81   .. monitor   CHF.  .. bnp 12/25-12/27/2022 70k - 51k   .. ho chf   .. chf meds to be reintroduced by cardio as allowed by bp   elevated lfts.  .. LFTS 12/23-12/24-12/25-12/26-12/27/2022       - [phone]- 306- 287     - 1434 - 6786- 670- 212      - 0766 - 2405 - 1824- 1041  .. 12/23/2022  ct cap pneumoretroperitoneum cw barotrauma   .. 12/23/2022 check hep profile   .. 12/23/2022 follow serially  .. elevcated lfts likely sec to anoxic hepatopathy during cac   DERRICK.  .. Cr 12/23-12/24-12/25-12/26-12/27-12/28-12/29/2022   Cr 1.7- 3.1- 4 - 4.5 - 4.2-4 - 3.3   .. uo 12/25-12/26/2022 400  - 500    .. fluids and serial monitoring  .. 12/25/2022 renal calld   .. 12/26/2022 seen Dr Escalante feels derrick sec ATN recommended d5w   Hypernatremia.  .. Na 12/24-12/25-12/26/2022 Na 147 - 148 - 147  .. 12/26/2022 iv changed to d5 50    AMS.  .. 12/25/2022 remains unresponsive   .. 12/25/2022 Nurse tells me that no sedation is being required   .. 12/23/2022 ct head(-)   .. 12/25/2022 neuro consultd   .. 12/26/2022 pt seen by Dr Hyatt To repeat ct in 72h  .. 12/29/2022 pt is much more awake and is moving l arm   .     TIME SPENT   Over 39 minutes aggregate critical care time spent on encounter; activities included   direct patient care, counseling and/or coordinating care reviewing notes, lab data/ imaging , discussion with multidisciplinary team/ patient  /family and explaining in detail risks, benefits, alternatives  of the recommendations     BIPIN DE LA CRUZ 61 m 12/23/2022 1960 DR KELVIN VANESSA

## 2022-12-29 NOTE — PROGRESS NOTE ADULT - ASSESSMENT
61 y/o M with a h/o HTN, dilated cardiomyopathy s/p AICD, afib/flutter, CKD (baseline Cr ~1.4), former substance abuse, with:    # Cardiac arrest  # Suspected anoxic encephalopathy  # Left PTX  # Pseudomonas pneumonia  # Klebsiella/raoutella UTI  # DERRICK  # Shock liver    - actively titrating ventilator settings to maintain SpO2 > 92% and adequate minute ventilation  - repeat CT chest reveals residual small loculated anterior PTX, maintain chest tube while he is on positive pressure ventilation  - will likely require tracheostomy if his neuro exam does not improve  - possible degree of anoxic brain injury based on clinical findings vs prolonged metabolic encephalopathy, repeat CT brain remains unremarkable  - no urgent indication for hemodialysis, however may need to consider this to clear severe uremia () as this may skew neuro exam  - trend BUN/Cr, electrolytes, acid-base balance, and monitor UOP (nonoliguric)  - nephrology input appreciated  - continue lactulose for mild hyperammonemia, LFTs slowly downtrending  - continue Zosyn, blood cultures negative for growth

## 2022-12-29 NOTE — PROGRESS NOTE ADULT - ASSESSMENT
62M CAD, Dilated cardiomyopathy, S/p AICD, Afib/flutter, h/o substance abuse, CKD baseline creat approx 1.4 last admitted to [facility] with MSSA bacteremia, and CHF.  Sent from [facility] with acute hypoxic respiratory failure/PEA arrest.  CT Head - No acute pathology.  On C pap since 8 am.  Improvement is seen. Pt seem to make eye contact and is able to visually track at times.   Repeat CT head ordered for today is pending.  D/w Nurse is at bedside. She told me that pt was able to squeeze hand in left hand earlier. Didn't squeeze for me.  Would follow.

## 2022-12-29 NOTE — PROGRESS NOTE ADULT - SUBJECTIVE AND OBJECTIVE BOX
Patient is a 62y old  Male who presents with a chief complaint of resp failure (26 Dec 2022 09:47)    HPI: 62M CAD, Dilated cardiomyopathy, S/p AICD, Afib/flutter, h/o substance abuse, CKD baseline creat approx 1.4 last admitted to [facility] with MSSA bacteremia, and CHF.  Sent from [facility] with acute hypoxic respiratory failure.  Was initially on BiPAP then became hypoxic.  Anesthesia intubated patient.  On post intubation Xray pneumothorax evident.  Patient with PEA arrest.  Chest tube placed by ED physician.  ROSC obtained.     Patient unable to give further history.      Intetrval history -     Intubated on vent.   On C pap since 8 am.  Improvement is seen. Pt seem to make eye contact and is able to visually track at times.   No seizure reported.  Nurse is at bedside. She told me that pt was able to squeeze hand in left hand earlier.    MEDICATIONS  (STANDING):    albuterol    90 MICROgram(s) HFA Inhaler 2 Puff(s) Inhalation every 6 hours  albuterol/ipratropium for Nebulization. 3 milliLiter(s) Nebulizer once  aspirin  chewable 81 milliGRAM(s) Oral daily  chlorhexidine 0.12% Liquid 15 milliLiter(s) Oral Mucosa every 12 hours  dextrose 5%. 1000 milliLiter(s) (100 mL/Hr) IV Continuous <Continuous>  dextrose 50% Injectable 25 Gram(s) IV Push once  dextrose 50% Injectable 12.5 Gram(s) IV Push once  dextrose 50% Injectable 25 Gram(s) IV Push once  glucagon  Injectable 1 milliGRAM(s) IntraMuscular once  heparin   Injectable 5000 Unit(s) SubCutaneous every 12 hours  insulin lispro (ADMELOG) corrective regimen sliding scale   SubCutaneous every 6 hours  ipratropium 17 MICROgram(s) HFA Inhaler 1 Puff(s) Inhalation every 6 hours  lactulose Syrup 20 Gram(s) Oral two times a day  mupirocin 2% Nasal 1 Application(s) Both Nostrils two times a day  pantoprazole  Injectable 40 milliGRAM(s) IV Push daily  piperacillin/tazobactam IVPB.. 3.375 Gram(s) IV Intermittent every 12 hours  sodium chloride 0.45%. 1000 milliLiter(s) (75 mL/Hr) IV Continuous <Continuous>    MEDICATIONS  (PRN):    dextrose Oral Gel 15 Gram(s) Oral once PRN Blood Glucose LESS THAN 70 milliGRAM(s)/deciliter  fentaNYL    Injectable 100 MICROGram(s) IV Push every 4 hours PRN MV synchr    Allergies    No Known Allergies    Intolerances    pork (Other)    REVIEW OF SYSTEMS: UTO    PHYSICAL EXAM:    Vital Signs Last 24 Hrs    T(C): 36.1 (29 Dec 2022 08:30), Max: 36.6 (29 Dec 2022 04:00)  T(F): 97 (29 Dec 2022 08:30), Max: 97.9 (29 Dec 2022 04:00)  HR: 82 (29 Dec 2022 12:00) (78 - 89)  BP: 101/70 (29 Dec 2022 12:00) (88/68 - 121/80)  BP(mean): 80 (29 Dec 2022 12:00) (75 - 103)  RR: 25 (29 Dec 2022 12:00) (13 - 25)  SpO2: 98% (29 Dec 2022 12:00) (97% - 99%)    Parameters below as of 29 Dec 2022 09:05  Patient On (Oxygen Delivery Method): ventilator,35    On Neurological Examination:    Intubated on vent.  Not on any sedation.  On C pap since 8 am.  Improvement is seen. Pt seem to make eye contact and is able to visually track at times.   Pupils are 3 mm, sluggishly reacting to light.  Neck is supple.  No abn body movements.    LABS:    CBC Full  -  ( 29 Dec 2022 12:29 )  WBC Count : 11.96 K/uL  RBC Count : 5.12 M/uL  Hemoglobin : 15.5 g/dL  Hematocrit : 47.6 %  Platelet Count - Automated : 116 K/uL  Mean Cell Volume : 93.0 fl  Mean Cell Hemoglobin : 30.3 pg  Mean Cell Hemoglobin Concentration : 32.6 gm/dL    12-28    144  |  108  |  127<H>  ----------------------------<  179<H>  4.5   |  27  |  4.04<H>    Ca    8.9      28 Dec 2022 06:40    TPro  6.1  /  Alb  2.4<L>  /  TBili  1.5<H>  /  DBili  x   /  AST  117<H>  /  ALT  735<H>  /  AlkPhos  326<H>  12-28       RADIOLOGY & ADDITIONAL STUDIES:    < from: CT Head No Cont (12.23.22 @ 21:18) >    IMPRESSION:    No large territory acute infarct, intracranial hemorrhage, or mass effect.    Slight progression of chronic microangiopathic white matter changes as compared to prior study from 2016.    < end of copied text >    < from: CT Chest No Cont (12.23.22 @ 21:19) >    IMPRESSION:    *  Left chest tube with residual small left pneumothorax. No evidence of tension.  *  Right basilar rounded atelectasis again noted with chronic small loculated right pleural effusion.  *  Small pneumomediastinum. Left chest and abdominal wall emphysema. Moderate pneumoretroperitoneum and properitoneal air. No pneumoperitoneum. Findings are compatible with barotrauma.  *  Evidence of urinary bladder cystitis.    < end of copied text >

## 2022-12-29 NOTE — PROGRESS NOTE ADULT - SUBJECTIVE AND OBJECTIVE BOX
Date/Time Patient Seen:  		  Referring MD:   Data Reviewed	       Patient is a 62y old  Male who presents with a chief complaint of resp failure (28 Dec 2022 22:58)      Subjective/HPI     PAST MEDICAL & SURGICAL HISTORY:  Heart failure, systolic    CAD (coronary artery disease)    Hypertension    Nonischemic cardiomyopathy    COPD, moderate    2019 novel coronavirus disease (COVID-19)    Substance abuse    HLD (hyperlipidemia)    Cardiac LV ejection fraction 10-20%    No significant past surgical history    AICD (automatic cardioverter/defibrillator) present    Cardiac LV ejection fraction 10-20%          Medication list         MEDICATIONS  (STANDING):  albuterol    90 MICROgram(s) HFA Inhaler 2 Puff(s) Inhalation every 6 hours  albuterol/ipratropium for Nebulization. 3 milliLiter(s) Nebulizer once  aspirin  chewable 81 milliGRAM(s) Oral daily  chlorhexidine 0.12% Liquid 15 milliLiter(s) Oral Mucosa every 12 hours  dextrose 5%. 1000 milliLiter(s) (100 mL/Hr) IV Continuous <Continuous>  dextrose 50% Injectable 25 Gram(s) IV Push once  dextrose 50% Injectable 12.5 Gram(s) IV Push once  dextrose 50% Injectable 25 Gram(s) IV Push once  glucagon  Injectable 1 milliGRAM(s) IntraMuscular once  heparin   Injectable 5000 Unit(s) SubCutaneous every 12 hours  insulin lispro (ADMELOG) corrective regimen sliding scale   SubCutaneous every 6 hours  ipratropium 17 MICROgram(s) HFA Inhaler 1 Puff(s) Inhalation every 6 hours  lactulose Syrup 20 Gram(s) Oral two times a day  mupirocin 2% Nasal 1 Application(s) Both Nostrils two times a day  pantoprazole  Injectable 40 milliGRAM(s) IV Push daily  piperacillin/tazobactam IVPB.. 3.375 Gram(s) IV Intermittent every 12 hours  sodium chloride 0.45%. 1000 milliLiter(s) (75 mL/Hr) IV Continuous <Continuous>    MEDICATIONS  (PRN):  dextrose Oral Gel 15 Gram(s) Oral once PRN Blood Glucose LESS THAN 70 milliGRAM(s)/deciliter  fentaNYL    Injectable 100 MICROGram(s) IV Push every 4 hours PRN MV synchr         Vitals log        ICU Vital Signs Last 24 Hrs  T(C): 36.6 (29 Dec 2022 04:00), Max: 36.7 (28 Dec 2022 12:42)  T(F): 97.9 (29 Dec 2022 04:00), Max: 98 (28 Dec 2022 12:42)  HR: 82 (29 Dec 2022 06:00) (78 - 98)  BP: 113/82 (29 Dec 2022 06:00) (88/68 - 134/86)  BP(mean): 92 (29 Dec 2022 06:00) (75 - 102)  ABP: --  ABP(mean): --  RR: 20 (29 Dec 2022 06:00) (19 - 22)  SpO2: 99% (29 Dec 2022 06:00) (97% - 99%)    O2 Parameters below as of 29 Dec 2022 06:00  Patient On (Oxygen Delivery Method): ventilator    O2 Concentration (%): 35         Mode: AC/ CMV (Assist Control/ Continuous Mandatory Ventilation)  RR (machine): 20  TV (machine): 400  FiO2: 35  PEEP: 5  ITime: 1  MAP: 11  PIP: 28      Input and Output:  I&O's Detail    27 Dec 2022 07:01  -  28 Dec 2022 07:00  --------------------------------------------------------  IN:    dextrose 5%: 600 mL    Enteral Tube Flush: 660 mL    IV PiggyBack: 100 mL    Lactated Ringers: 800 mL    Nepro with Carb Steady: 880 mL    sodium chloride 0.45%: 450 mL  Total IN: 3490 mL    OUT:    Chest Tube (mL): 0 mL    Indwelling Catheter - Urethral (mL): 1100 mL  Total OUT: 1100 mL    Total NET: 2390 mL      28 Dec 2022 07:01  -  29 Dec 2022 06:41  --------------------------------------------------------  IN:    Enteral Tube Flush: 360 mL    IV PiggyBack: 100 mL    Nepro with Carb Steady: 480 mL    sodium chloride 0.45%: 900 mL  Total IN: 1840 mL    OUT:    Indwelling Catheter - Urethral (mL): 350 mL  Total OUT: 350 mL    Total NET: 1490 mL          Lab Data                        15.6   12.51 )-----------( 136      ( 28 Dec 2022 06:40 )             49.6     12-28    144  |  108  |  127<H>  ----------------------------<  179<H>  4.5   |  27  |  4.04<H>    Ca    8.9      28 Dec 2022 06:40    TPro  6.1  /  Alb  2.4<L>  /  TBili  1.5<H>  /  DBili  x   /  AST  117<H>  /  ALT  735<H>  /  AlkPhos  326<H>  12-28            Review of Systems	      Objective     Physical Examination    heart s1s2  lung dec BS  head nc      Pertinent Lab findings & Imaging      Leo:  NO   Adequate UO     I&O's Detail    27 Dec 2022 07:01  -  28 Dec 2022 07:00  --------------------------------------------------------  IN:    dextrose 5%: 600 mL    Enteral Tube Flush: 660 mL    IV PiggyBack: 100 mL    Lactated Ringers: 800 mL    Nepro with Carb Steady: 880 mL    sodium chloride 0.45%: 450 mL  Total IN: 3490 mL    OUT:    Chest Tube (mL): 0 mL    Indwelling Catheter - Urethral (mL): 1100 mL  Total OUT: 1100 mL    Total NET: 2390 mL      28 Dec 2022 07:01  -  29 Dec 2022 06:41  --------------------------------------------------------  IN:    Enteral Tube Flush: 360 mL    IV PiggyBack: 100 mL    Nepro with Carb Steady: 480 mL    sodium chloride 0.45%: 900 mL  Total IN: 1840 mL    OUT:    Indwelling Catheter - Urethral (mL): 350 mL  Total OUT: 350 mL    Total NET: 1490 mL               Discussed with:     Cultures:	        Radiology

## 2022-12-29 NOTE — PROGRESS NOTE ADULT - SUBJECTIVE AND OBJECTIVE BOX
Patient is a 62y old  Male who presents with a chief complaint of resp failure (29 Dec 2022 13:12)      INTERVAL HPI/OVERNIGHT EVENTS: events noted.     MEDICATIONS  (STANDING):  albuterol    90 MICROgram(s) HFA Inhaler 2 Puff(s) Inhalation every 6 hours  albuterol/ipratropium for Nebulization. 3 milliLiter(s) Nebulizer once  aspirin  chewable 81 milliGRAM(s) Oral daily  chlorhexidine 0.12% Liquid 15 milliLiter(s) Oral Mucosa every 12 hours  dextrose 5%. 1000 milliLiter(s) (100 mL/Hr) IV Continuous <Continuous>  dextrose 50% Injectable 25 Gram(s) IV Push once  dextrose 50% Injectable 12.5 Gram(s) IV Push once  dextrose 50% Injectable 25 Gram(s) IV Push once  glucagon  Injectable 1 milliGRAM(s) IntraMuscular once  heparin   Injectable 5000 Unit(s) SubCutaneous every 12 hours  insulin lispro (ADMELOG) corrective regimen sliding scale   SubCutaneous every 6 hours  ipratropium 17 MICROgram(s) HFA Inhaler 1 Puff(s) Inhalation every 6 hours  lactulose Syrup 20 Gram(s) Oral two times a day  mupirocin 2% Nasal 1 Application(s) Both Nostrils two times a day  pantoprazole  Injectable 40 milliGRAM(s) IV Push daily  piperacillin/tazobactam IVPB.. 3.375 Gram(s) IV Intermittent every 12 hours  sodium chloride 0.45%. 1000 milliLiter(s) (75 mL/Hr) IV Continuous <Continuous>    MEDICATIONS  (PRN):  dextrose Oral Gel 15 Gram(s) Oral once PRN Blood Glucose LESS THAN 70 milliGRAM(s)/deciliter  fentaNYL    Injectable 100 MICROGram(s) IV Push every 4 hours PRN MV synchr      Allergies  No Known Allergies    Intolerances  pork (Other)      REVIEW OF SYSTEMS:  unable to obtain    Vital Signs Last 24 Hrs  T(C): 36.1 (29 Dec 2022 08:30), Max: 36.6 (29 Dec 2022 04:00)  T(F): 97 (29 Dec 2022 08:30), Max: 97.9 (29 Dec 2022 04:00)  HR: 82 (29 Dec 2022 12:00) (78 - 89)  BP: 101/70 (29 Dec 2022 12:00) (88/68 - 121/80)  BP(mean): 80 (29 Dec 2022 12:00) (75 - 103)  RR: 25 (29 Dec 2022 12:00) (13 - 25)  SpO2: 98% (29 Dec 2022 12:00) (97% - 99%)    Parameters below as of 29 Dec 2022 09:05  Patient On (Oxygen Delivery Method): ventilator,35        PHYSICAL EXAM:  GENERAL: intubated.   HEAD:  Atraumatic, Normocephalic  EYES:  conjunctiva and sclera clear  ENMT: intubated, ngt in place  NECK: Supple  NERVOUS SYSTEM:  no response to pain   CHEST/LUNG: Clear to auscultation bilaterally;  HEART: Regular rate and rhythm;  ABDOMEN: Soft, Nontender, Nondistended; Bowel sounds present  EXTREMITIES:  2+ Peripheral Pulses, No clubbing, cyanosis, or edema      LABS:                        15.5   11.96 )-----------( 116      ( 29 Dec 2022 12:29 )             47.6     29 Dec 2022 12:29    144    |  109    |  123    ----------------------------<  122    4.3     |  24     |  3.93     Ca    8.3        29 Dec 2022 12:29    TPro  5.8    /  Alb  2.3    /  TBili  1.4    /  DBili  x      /  AST  80     /  ALT  411    /  AlkPhos  295    29 Dec 2022 12:29        CAPILLARY BLOOD GLUCOSE  POCT Blood Glucose.: 127 mg/dL (29 Dec 2022 12:03)  POCT Blood Glucose.: 135 mg/dL (29 Dec 2022 06:45)  POCT Blood Glucose.: 124 mg/dL (28 Dec 2022 23:51)  POCT Blood Glucose.: 163 mg/dL (28 Dec 2022 17:14)      RADIOLOGY & ADDITIONAL TESTS:    Imaging Personally Reviewed:  [ ] YES     Consultant(s) Notes Reviewed:      Care Discussed with Consultants/Other Providers:    Advanced Directives: [ ] DNR  [ ] No feeding tube  [ ] MOLST in chart  [ ] MOLST completed today  [ ] Unknown

## 2022-12-29 NOTE — PROGRESS NOTE ADULT - ASSESSMENT
The patient is a 62 year old male with a history of CAD, chronic systolic heart failure s/p ICD, atrial flutter s/p DCCV, substance abuse, CKD who is admitted with respiratory failure in the setting of tension pneumothorax complicated by cardiac arrest.    Plan:  - ECG with sinus tachycardia and known LBBB  - Echo 2/22 with severely reduced LV (EF 10%) and RV function, mod MR, mod TR, mild pulm HTN  - Hold all heart failure medications for now (BB, Entresto, spironolactone, bumetanide)  - When extubated and BP trends up, will attempt to resume heart failure medications  - Worsening renal function - hold apixaban  - Continue aspirin 81 mg daily  - Chest tube in place  - IV antibiotics  - Mechanical ventilation  - ICU care  - Overall poor prognosis      35 minutes of critical care time spent with the patient and coordinating care with nursing, hospitalist, and consultants. Patient is critically ill requiring ICU care. The patient is high risk for deterioration and death.

## 2022-12-29 NOTE — PROGRESS NOTE ADULT - SUBJECTIVE AND OBJECTIVE BOX
Patient is a 62y old  Male who presents with a chief complaint of resp failure (29 Dec 2022 14:08)      BRIEF HOSPITAL COURSE: 63 y/o M with a h/o HTN, dilated cardiomyopathy s/p AICD, afib/flutter, CKD (baseline Cr ~1.4), former substance abuse, admitted on 12/23 with acute hypoxic respiratory failure requiring intubation. Post-intubation CXR with left sided PTX and patient subsequently developed PEA cardiac arrest. Emergent chest tube placed by ED physician. Hospital course complicated by aspiration pneumonia, shock, DERRICK on CKD, severe transaminitis, and persistent encephalopathy.     Events last 24 hours: Patient remains on full mechanical vent support. Mental status improved today- will open eyes to verbal stimuli and track. Had reportedly been squeezing left hand lightly to command, but not tonight. He does wiggle his left toes briskly though. Repeat CT brain unremarkable. BUN/Cr very elevated but lateral, remains nonoliguric. Left sided chest tube without air leak. Repeat CT chest reveals residual small loculated anterior PTX.           PAST MEDICAL & SURGICAL HISTORY:  Heart failure, systolic      CAD (coronary artery disease)      Hypertension      Nonischemic cardiomyopathy      COPD, moderate      2019 novel coronavirus disease (COVID-19)      Substance abuse      HLD (hyperlipidemia)      Cardiac LV ejection fraction 10-20%      AICD (automatic cardioverter/defibrillator) present      Cardiac LV ejection fraction 10-20%          Review of Systems:  Unable to obtain secondary to AMS/intubation.    Medications:  piperacillin/tazobactam IVPB.. 3.375 Gram(s) IV Intermittent every 12 hours  albuterol    90 MICROgram(s) HFA Inhaler 2 Puff(s) Inhalation every 6 hours  albuterol/ipratropium for Nebulization. 3 milliLiter(s) Nebulizer once  ipratropium 17 MICROgram(s) HFA Inhaler 1 Puff(s) Inhalation every 6 hours  fentaNYL    Injectable 100 MICROGram(s) IV Push every 4 hours PRN  aspirin  chewable 81 milliGRAM(s) Oral daily  heparin   Injectable 5000 Unit(s) SubCutaneous every 12 hours  lactulose Syrup 20 Gram(s) Oral two times a day  pantoprazole  Injectable 40 milliGRAM(s) IV Push daily  dextrose 50% Injectable 25 Gram(s) IV Push once  dextrose 50% Injectable 12.5 Gram(s) IV Push once  dextrose 50% Injectable 25 Gram(s) IV Push once  dextrose Oral Gel 15 Gram(s) Oral once PRN  glucagon  Injectable 1 milliGRAM(s) IntraMuscular once  insulin lispro (ADMELOG) corrective regimen sliding scale   SubCutaneous every 6 hours  dextrose 5%. 1000 milliLiter(s) IV Continuous <Continuous>  sodium chloride 0.45%. 1000 milliLiter(s) IV Continuous <Continuous>  chlorhexidine 0.12% Liquid 15 milliLiter(s) Oral Mucosa every 12 hours  mupirocin 2% Nasal 1 Application(s) Both Nostrils two times a day        Mode: AC/ CMV (Assist Control/ Continuous Mandatory Ventilation)  RR (machine): 20  TV (machine): 400  FiO2: 35  PEEP: 5  ITime: 1  MAP: 11  PIP: 24      ICU Vital Signs Last 24 Hrs  T(C): 36.9 (29 Dec 2022 16:11), Max: 36.9 (29 Dec 2022 16:11)  T(F): 98.4 (29 Dec 2022 16:11), Max: 98.4 (29 Dec 2022 16:11)  HR: 85 (29 Dec 2022 20:20) (65 - 104)  BP: 101/79 (29 Dec 2022 20:00) (88/68 - 121/80)  BP(mean): 87 (29 Dec 2022 20:00) (75 - 103)  ABP: --  ABP(mean): --  RR: 21 (29 Dec 2022 20:00) (13 - 25)  SpO2: 98% (29 Dec 2022 20:20) (87% - 99%)    O2 Parameters below as of 29 Dec 2022 20:20  Patient On (Oxygen Delivery Method): ventilator,35%                I&O's Detail    28 Dec 2022 07:01  -  29 Dec 2022 07:00  --------------------------------------------------------  IN:    Enteral Tube Flush: 360 mL    IV PiggyBack: 100 mL    Nepro with Carb Steady: 480 mL    sodium chloride 0.45%: 900 mL  Total IN: 1840 mL    OUT:    Indwelling Catheter - Urethral (mL): 350 mL  Total OUT: 350 mL    Total NET: 1490 mL      29 Dec 2022 07:01  -  29 Dec 2022 20:32  --------------------------------------------------------  IN:    Nepro with Carb Steady: 80 mL    sodium chloride 0.45%: 600 mL  Total IN: 680 mL    OUT:    Indwelling Catheter - Urethral (mL): 800 mL  Total OUT: 800 mL    Total NET: -120 mL            LABS:                        15.5   11.96 )-----------( 116      ( 29 Dec 2022 12:29 )             47.6     12-29    144  |  109<H>  |  123<H>  ----------------------------<  122<H>  4.3   |  24  |  3.93<H>    Ca    8.3<L>      29 Dec 2022 12:29    TPro  5.8<L>  /  Alb  2.3<L>  /  TBili  1.4<H>  /  DBili  x   /  AST  80<H>  /  ALT  411<H>  /  AlkPhos  295<H>  12-29          CAPILLARY BLOOD GLUCOSE      POCT Blood Glucose.: 117 mg/dL (29 Dec 2022 16:55)        CULTURES:  Culture Results:   Rare Pseudomonas aeruginosa  Normal Respiratory Cathy absent (12-24-22 @ 17:43)  Culture Results:   No Growth Final (12-23-22 @ 14:06)  Culture Results:   >100,000 CFU/ml Klebsiella oxytoca/Raoultella ornithinolytica (12-23-22 @ 11:16)  Culture Results:   No Growth Final (12-23-22 @ 10:17)        Physical Examination:    General: No acute distress. resting but arousable, intubated    HEENT: Pupils equal, reactive to light.  Symmetric.    PULM: Clear to auscultation bilaterally, no significant sputum production, (+)left sided chest tube    CVS: Regular rate and rhythm, no murmurs, rubs, or gallops    ABD: Soft, nondistended, nontender, normoactive bowel sounds, no masses    EXT: No edema, nontender    SKIN: Warm and well perfused, no rashes noted.    NEURO: opens eyes to verbal stimuli with tracking, wiggles left toes briskly to command, RUE/LUE/RLE flaccid      RADIOLOGY:     < from: CT Chest No Cont (12.29.22 @ 13:20) >  FINDINGS:    AIRWAYS, LUNGS, PLEURA: Satisfactory positioning of endotracheal tube.   Mild central airways secretions. Unchanged small loculated left   pneumothorax. Small chronic loculated right pleural effusion unchanged.   Right basilar and right middlelobe atelectasis. Emphysema.    Left chest tube in place with distal tip along the medial and upper   pleural space.    MEDIASTINUM: Cardiomegaly. No pericardial effusion. Thoracic aorta normal   caliber.  No large mediastinal lymph nodes. ICD lead terminates in the   right ventricle.    IMAGED ABDOMEN: Enteric catheter terminates in the stomach.   Cholelithiasis. Decreased abdominal extraluminal gas.    SOFT TISSUES: Decreased subcutaneous soft tissue emphysema.    BONES: Unremarkable.      IMPRESSION:.    Unchanged small loculated left pneumothorax.    Unchanged small loculated and chronic right pleural effusion.    Decreased subcutaneous soft tissue emphysema and extraluminal abdominal   gas.        < from: CT Head No Cont (12.29.22 @ 13:20) >  FINDINGS: There is no acute intracranial hemorrhage, mass effect, shift   of the midline structures, herniation, extra-axial fluid collection, or   hydrocephalus.    There is diffuse cerebral volume loss with prominence of the sulci,   fissures, and cisternal spaces which is normal for the patient's age.   There is mild deep and periventricular white matter hypoattenuation   statistically compatible with microvascular changes given calcific   atherosclerotic disease of the intracranial arteries.    The paranasal sinuses and mastoid air cells are clear. The calvarium is   intact. The imaged orbits are unremarkable.    IMPRESSION: No acute intracranial hemorrhage, mass effect, or shift of   the midline structures.    Similar-appearing mild chronic white matter microvascular type changes.          CRITICAL CARE TIME SPENT: 36 mins  Time spent evaluating/treating patient with medical issues that pose a high risk for life threatening deterioration and/or end-organ damage, reviewing data/labs/imaging, discussing case with multidisciplinary team, discussing plan/goals of care with patient/family. Non-inclusive of procedure time.

## 2022-12-29 NOTE — PROGRESS NOTE ADULT - SUBJECTIVE AND OBJECTIVE BOX
Patient is a 62y old  Male who presents with a chief complaint of resp failure (29 Dec 2022 10:19)    Patient seen in follow up for DERRICK.        PAST MEDICAL HISTORY:  Heart failure, systolic    CAD (coronary artery disease)    Hypertension    Nonischemic cardiomyopathy    COPD, moderate    2019 novel coronavirus disease (COVID-19)    Substance abuse    HLD (hyperlipidemia)    Cardiac LV ejection fraction 10-20%      MEDICATIONS  (STANDING):  albuterol    90 MICROgram(s) HFA Inhaler 2 Puff(s) Inhalation every 6 hours  albuterol/ipratropium for Nebulization. 3 milliLiter(s) Nebulizer once  aspirin  chewable 81 milliGRAM(s) Oral daily  chlorhexidine 0.12% Liquid 15 milliLiter(s) Oral Mucosa every 12 hours  dextrose 5%. 1000 milliLiter(s) (100 mL/Hr) IV Continuous <Continuous>  dextrose 50% Injectable 25 Gram(s) IV Push once  dextrose 50% Injectable 12.5 Gram(s) IV Push once  dextrose 50% Injectable 25 Gram(s) IV Push once  glucagon  Injectable 1 milliGRAM(s) IntraMuscular once  heparin   Injectable 5000 Unit(s) SubCutaneous every 12 hours  insulin lispro (ADMELOG) corrective regimen sliding scale   SubCutaneous every 6 hours  ipratropium 17 MICROgram(s) HFA Inhaler 1 Puff(s) Inhalation every 6 hours  lactulose Syrup 20 Gram(s) Oral two times a day  mupirocin 2% Nasal 1 Application(s) Both Nostrils two times a day  pantoprazole  Injectable 40 milliGRAM(s) IV Push daily  piperacillin/tazobactam IVPB.. 3.375 Gram(s) IV Intermittent every 12 hours  sodium chloride 0.45%. 1000 milliLiter(s) (75 mL/Hr) IV Continuous <Continuous>    MEDICATIONS  (PRN):  dextrose Oral Gel 15 Gram(s) Oral once PRN Blood Glucose LESS THAN 70 milliGRAM(s)/deciliter  fentaNYL    Injectable 100 MICROGram(s) IV Push every 4 hours PRN MV synchr    T(C): 36.1 (12-29-22 @ 08:30), Max: 36.8 (12-28-22 @ 04:00)  HR: 78 (12-29-22 @ 09:05) (78 - 98)  BP: 103/78 (12-29-22 @ 08:00) (88/68 - 134/87)  RR: 14 (12-29-22 @ 08:00) (14 - 22)  SpO2: 98% (12-29-22 @ 09:05) (97% - 100%)  Wt(kg): --  I&O's Detail    28 Dec 2022 07:01  -  29 Dec 2022 07:00  --------------------------------------------------------  IN:    Enteral Tube Flush: 360 mL    IV PiggyBack: 100 mL    Nepro with Carb Steady: 480 mL    sodium chloride 0.45%: 900 mL  Total IN: 1840 mL    OUT:    Indwelling Catheter - Urethral (mL): 350 mL  Total OUT: 350 mL    Total NET: 1490 mL          PHYSICAL EXAM:  General: on vent  Respiratory: b/l air entry  Cardiovascular: S1 S2  Gastrointestinal: soft  Extremities:  no edema    Mode: CPAP with PS, FiO2: 35, PEEP: 5, PS: 10, ITime: 1, MAP: 11, PIP: 25                          15.6   12.51 )-----------( 136      ( 28 Dec 2022 06:40 )             49.6     12-28    144  |  108  |  127<H>  ----------------------------<  179<H>  4.5   |  27  |  4.04<H>    Ca    8.9      28 Dec 2022 06:40    TPro  6.1  /  Alb  2.4<L>  /  TBili  1.5<H>  /  DBili  x   /  AST  117<H>  /  ALT  735<H>  /  AlkPhos  326<H>  12-28        LIVER FUNCTIONS - ( 28 Dec 2022 06:40 )  Alb: 2.4 g/dL / Pro: 6.1 g/dL / ALK PHOS: 326 U/L / ALT: 735 U/L DA / AST: 117 U/L / GGT: x               Sodium, Serum: 144 (12-28 @ 06:40)  Sodium, Serum: 147 (12-27 @ 06:36)  Sodium, Serum: 147 (12-26 @ 06:46)    Creatinine, Serum: 4.04 (12-28 @ 06:40)  Creatinine, Serum: 4.20 (12-27 @ 06:36)  Creatinine, Serum: 4.58 (12-26 @ 06:46)    Potassium, Serum: 4.5 (12-28 @ 06:40)  Potassium, Serum: 4.7 (12-27 @ 06:36)  Potassium, Serum: 4.5 (12-26 @ 06:46)    Hemoglobin: 15.6 (12-28 @ 06:40)  Hemoglobin: 16.4 (12-27 @ 06:36)  Hemoglobin: 15.9 (12-26 @ 06:46)

## 2022-12-29 NOTE — PROGRESS NOTE ADULT - ASSESSMENT
62M CAD, Dilated cardiomyopathy, S/p AICD, Afib/flutter, h/o substance abuse,  presented with acute hypoxic and hypercarbic respiratory failure associated with pneumothorax which lled to cardiac arrest       Acute respiratory failure secondary to tension pneumothorax with leading to cardiac arrest   - continue vent management  - continue chest tube to water seal, d/w CT surgery -keep chest tube in until after extubation or trach.  can keep on water seal until then.  - continue on zosyn as per ID  - no wheezing noted  - tapered off steroids    hypernatremia  - improved  - cont IVF and continue to monitor      Cardiomyopathy  - current cardiac issues appear to be secondary to pulm issues at this time  - hold medications for now   - hold eliquis pending possible further procedures and hospital course    DM2  - not on meds at SNF  - FS monitoring with lispro coverage scale while critically ill    DERRICK on CKD  - Likely ATN due to above, possible HF as well  - monitor creatinine,   - avoid nephrotoxic agents    Prophylactic measure  - DVT proph: heparin SQ for now  - GI proph: protonix    D/w Dr Buster EMANUEL, Dr Zapien

## 2022-12-29 NOTE — PROGRESS NOTE ADULT - ASSESSMENT
1.  DERRICK due to ischemic ATN - maintenance phase - non oliguric  2.  Acute respiratory failure and PEA arrest with ROSC  3.  Mild hypernatremia  4.  Status post large left sided tension pneumothorax  5.  Diabetes    Repeat labs today. To continue current meds. Neurology follow up. Avoid nephrotoxic meds as possible.   Monitor blood sugar levels. Insulin coverage as needed. Free water with tube feeds.

## 2022-12-30 NOTE — PROGRESS NOTE ADULT - ASSESSMENT
62M CAD, Dilated cardiomyopathy, S/p AICD, Afib/flutter, h/o substance abuse, CKD baseline creat approx 1.4 last admitted to [facility] with MSSA bacteremia, and CHF.  Sent from [facility] with acute hypoxic respiratory failure/PEA arrest.  Initial CT Head - No acute pathology.  On C pap  Improvement is seen. Pt seem to make eye contact and is able to visually track at times.   Repeat CT head 12/29 - : No acute intracranial hemorrhage, mass effect, or shift of the midline structures. Similar-appearing mild chronic white matter microvascular type changes.  Would follow.

## 2022-12-30 NOTE — CONSULT NOTE ADULT - ASSESSMENT
post cardiac respiratory arrest with pneumothorax and acute change in mental status . to try and wean over long weekend. if unsuccessful then trach. Because of actue mental status change patient most likely can not handle is own secretions

## 2022-12-30 NOTE — PROGRESS NOTE ADULT - SUBJECTIVE AND OBJECTIVE BOX
Patient is a 62y old  Male who presents with a chief complaint of resp failure (30 Dec 2022 14:08)      INTERVAL HPI/OVERNIGHT EVENTS: no events.  on cpap trials.     MEDICATIONS  (STANDING):  albuterol    90 MICROgram(s) HFA Inhaler 2 Puff(s) Inhalation every 6 hours  albuterol/ipratropium for Nebulization. 3 milliLiter(s) Nebulizer once  aMIOdarone    Tablet 200 milliGRAM(s) Oral daily  aspirin  chewable 81 milliGRAM(s) Oral daily  chlorhexidine 0.12% Liquid 15 milliLiter(s) Oral Mucosa every 12 hours  dextrose 5%. 1000 milliLiter(s) (100 mL/Hr) IV Continuous <Continuous>  dextrose 50% Injectable 25 Gram(s) IV Push once  dextrose 50% Injectable 12.5 Gram(s) IV Push once  dextrose 50% Injectable 25 Gram(s) IV Push once  glucagon  Injectable 1 milliGRAM(s) IntraMuscular once  heparin   Injectable 5000 Unit(s) SubCutaneous every 12 hours  insulin lispro (ADMELOG) corrective regimen sliding scale   SubCutaneous every 6 hours  ipratropium 17 MICROgram(s) HFA Inhaler 1 Puff(s) Inhalation every 6 hours  lactulose Syrup 20 Gram(s) Oral two times a day  mupirocin 2% Nasal 1 Application(s) Both Nostrils two times a day  pantoprazole  Injectable 40 milliGRAM(s) IV Push daily  piperacillin/tazobactam IVPB.. 3.375 Gram(s) IV Intermittent every 12 hours  sodium chloride 0.45%. 1000 milliLiter(s) (75 mL/Hr) IV Continuous <Continuous>    MEDICATIONS  (PRN):  dextrose Oral Gel 15 Gram(s) Oral once PRN Blood Glucose LESS THAN 70 milliGRAM(s)/deciliter  fentaNYL    Injectable 100 MICROGram(s) IV Push every 4 hours PRN MV synchr      Allergies  No Known Allergies    Intolerances  pork (Other)      REVIEW OF SYSTEMS:  unable to obtain    Vital Signs Last 24 Hrs  T(C): 36.5 (30 Dec 2022 12:27), Max: 37.2 (30 Dec 2022 04:06)  T(F): 97.7 (30 Dec 2022 12:27), Max: 98.9 (30 Dec 2022 04:06)  HR: 80 (30 Dec 2022 13:17) (65 - 99)  BP: 116/81 (30 Dec 2022 13:00) (99/80 - 118/83)  BP(mean): 93 (30 Dec 2022 13:00) (82 - 95)  RR: 14 (30 Dec 2022 13:00) (14 - 21)  SpO2: 99% (30 Dec 2022 13:17) (87% - 100%)    Parameters below as of 30 Dec 2022 13:17  Patient On (Oxygen Delivery Method): ventilator,35        PHYSICAL EXAM:  GENERAL: NAD  HEAD:  Atraumatic, Normocephalic  EYES:  conjunctiva and sclera clear  ENMT: Moist mucous membranes, intubated  NECK: Supple, No JVD  NERVOUS SYSTEM:  Alert & Oriented X3, Good concentration; All 4 extremities mobile, no gross sensory deficits.   CHEST/LUNG: Clear to auscultation bilaterally;   HEART: Regular rate and rhythm;   ABDOMEN: Soft, Nontender, Nondistended; Bowel sounds present  EXTREMITIES:  2+ Peripheral Pulses, No clubbing, cyanosis, or edema      LABS:      Ca    8.3        29 Dec 2022 12:29          CAPILLARY BLOOD GLUCOSE  POCT Blood Glucose.: 131 mg/dL (30 Dec 2022 11:34)  POCT Blood Glucose.: 132 mg/dL (30 Dec 2022 05:40)  POCT Blood Glucose.: 145 mg/dL (29 Dec 2022 23:44)  POCT Blood Glucose.: 117 mg/dL (29 Dec 2022 16:55)      RADIOLOGY & ADDITIONAL TESTS:    Imaging Personally Reviewed:  [ ] YES     Consultant(s) Notes Reviewed:      Care Discussed with Consultants/Other Providers:    Advanced Directives: [ ] DNR  [ ] No feeding tube  [ ] MOLST in chart  [ ] MOLST completed today  [ ] Unknown

## 2022-12-30 NOTE — PROGRESS NOTE ADULT - ASSESSMENT
The patient is a 62 year old male with a history of CAD, chronic systolic heart failure s/p ICD, atrial flutter s/p DCCV, substance abuse, CKD who is admitted with respiratory failure in the setting of tension pneumothorax complicated by cardiac arrest.    Plan:  - ECG with sinus tachycardia and known LBBB  - Echo 2/22 with severely reduced LV (EF 10%) and RV function, mod MR, mod TR, mild pulm HTN  - When extubated and BP trends up, will attempt to resume Entresto and metoprolol succinate  - Poor renal function - hold apixaban  - Hold bumetanide and spironolactone due to DERRICK  - Resume amiodarone 200 mg daily  - Continue aspirin 81 mg daily  - IV antibiotics  - Mechanical ventilation  - ICU care  - Overall poor prognosis

## 2022-12-30 NOTE — PROGRESS NOTE ADULT - SUBJECTIVE AND OBJECTIVE BOX
Patient is a 62y old  Male who presents with a chief complaint of resp failure (26 Dec 2022 09:47)    HPI: 62M CAD, Dilated cardiomyopathy, S/p AICD, Afib/flutter, h/o substance abuse, CKD baseline creat approx 1.4 last admitted to [facility] with MSSA bacteremia, and CHF.  Sent from [facility] with acute hypoxic respiratory failure.  Was initially on BiPAP then became hypoxic.  Anesthesia intubated patient.  On post intubation Xray pneumothorax evident.  Patient with PEA arrest.  Chest tube placed by ED physician.  ROSC obtained.     Patient unable to give further history.      Intetrval history -     Intubated on vent.   On C pap     MEDICATIONS  (STANDING):    albuterol    90 MICROgram(s) HFA Inhaler 2 Puff(s) Inhalation every 6 hours  albuterol/ipratropium for Nebulization. 3 milliLiter(s) Nebulizer once  aspirin  chewable 81 milliGRAM(s) Oral daily  chlorhexidine 0.12% Liquid 15 milliLiter(s) Oral Mucosa every 12 hours  dextrose 5%. 1000 milliLiter(s) (100 mL/Hr) IV Continuous <Continuous>  dextrose 50% Injectable 25 Gram(s) IV Push once  dextrose 50% Injectable 12.5 Gram(s) IV Push once  dextrose 50% Injectable 25 Gram(s) IV Push once  glucagon  Injectable 1 milliGRAM(s) IntraMuscular once  heparin   Injectable 5000 Unit(s) SubCutaneous every 12 hours  insulin lispro (ADMELOG) corrective regimen sliding scale   SubCutaneous every 6 hours  ipratropium 17 MICROgram(s) HFA Inhaler 1 Puff(s) Inhalation every 6 hours  lactulose Syrup 20 Gram(s) Oral two times a day  mupirocin 2% Nasal 1 Application(s) Both Nostrils two times a day  pantoprazole  Injectable 40 milliGRAM(s) IV Push daily  piperacillin/tazobactam IVPB.. 3.375 Gram(s) IV Intermittent every 12 hours  sodium chloride 0.45%. 1000 milliLiter(s) (75 mL/Hr) IV Continuous <Continuous>    MEDICATIONS  (PRN):    dextrose Oral Gel 15 Gram(s) Oral once PRN Blood Glucose LESS THAN 70 milliGRAM(s)/deciliter  fentaNYL    Injectable 100 MICROGram(s) IV Push every 4 hours PRN MV synchr    Allergies    No Known Allergies    Intolerances    pork (Other)    REVIEW OF SYSTEMS: UTO    PHYSICAL EXAM:    Vital Signs Last 24 Hrs    T(C): 36.6 (30 Dec 2022 07:41), Max: 37.2 (30 Dec 2022 04:06)  T(F): 97.9 (30 Dec 2022 07:41), Max: 98.9 (30 Dec 2022 04:06)  HR: 82 (30 Dec 2022 10:21) (65 - 104)  BP: 108/84 (30 Dec 2022 10:00) (99/80 - 118/83)  BP(mean): 92 (30 Dec 2022 10:00) (80 - 95)  RR: 14 (30 Dec 2022 10:00) (14 - 25)  SpO2: 99% (30 Dec 2022 10:21) (87% - 100%)    Parameters below as of 30 Dec 2022 10:00  Patient On (Oxygen Delivery Method): ventilator,CPAP mode    On Neurological Examination:    Intubated on vent.  Not on any sedation.  On C pap   Pt seem to make eye contact at times.  Pupils are 3 mm, sluggishly reacting to light.  Neck is supple.  No abn body movements.    LABS:    CBC Full  -  ( 29 Dec 2022 12:29 )  WBC Count : 11.96 K/uL  RBC Count : 5.12 M/uL  Hemoglobin : 15.5 g/dL  Hematocrit : 47.6 %  Platelet Count - Automated : 116 K/uL  Mean Cell Volume : 93.0 fl  Mean Cell Hemoglobin : 30.3 pg  Mean Cell Hemoglobin Concentration : 32.6 gm/dL    12-28    144  |  108  |  127<H>  ----------------------------<  179<H>  4.5   |  27  |  4.04<H>    Ca    8.9      28 Dec 2022 06:40    TPro  6.1  /  Alb  2.4<L>  /  TBili  1.5<H>  /  DBili  x   /  AST  117<H>  /  ALT  735<H>  /  AlkPhos  326<H>  12-28       RADIOLOGY & ADDITIONAL STUDIES:    r< from: CT Head No Cont (12.29.22 @ 13:20) >    IMPRESSION: No acute intracranial hemorrhage, mass effect, or shift of   the midline structures.    Similar-appearing mild chronic white matter microvascular type changes.    < end of copied text >      < from: CT Head No Cont (12.23.22 @ 21:18) >    IMPRESSION:    No large territory acute infarct, intracranial hemorrhage, or mass effect.    Slight progression of chronic microangiopathic white matter changes as compared to prior study from 2016.    < end of copied text >    < from: CT Chest No Cont (12.23.22 @ 21:19) >    IMPRESSION:    *  Left chest tube with residual small left pneumothorax. No evidence of tension.  *  Right basilar rounded atelectasis again noted with chronic small loculated right pleural effusion.  *  Small pneumomediastinum. Left chest and abdominal wall emphysema. Moderate pneumoretroperitoneum and properitoneal air. No pneumoperitoneum. Findings are compatible with barotrauma.  *  Evidence of urinary bladder cystitis.    < end of copied text >

## 2022-12-30 NOTE — PROGRESS NOTE ADULT - ASSESSMENT
62M CAD, Dilated cardiomyopathy, S/p AICD, Afib/flutter, h/o substance abuse,  presented with acute hypoxic and hypercarbic respiratory failure associated with pneumothorax.      ptx  resp failure  arnoldo hx  CM  AICD  AF    no immediate family  friends only on listed number  no decision making capacity from friends  may need guardianship and decision from 2 MD's

## 2022-12-30 NOTE — PROGRESS NOTE ADULT - ASSESSMENT
62M CAD, Dilated cardiomyopathy, S/p AICD, Afib/flutter, h/o substance abuse,  presented with acute hypoxic and hypercarbic respiratory failure associated with pneumothorax which lled to cardiac arrest       Acute respiratory failure secondary to tension pneumothorax with leading to cardiac arrest   - continue vent management;  weaning vs. trach next week.  ENT aware of poss trach.   - continue chest tube to water seal, d/w CT surgery -keep chest tube in until after extubation or trach.  can keep on water seal until then.  - continue on zosyn as per ID  - no wheezing noted  - tapered off steroids    hypernatremia  - improved  - cont IVF and continue to monitor      Cardiomyopathy  - current cardiac issues appear to be secondary to pulm issues at this time  - hold medications for now   - hold eliquis pending possible further procedures and hospital course    DM2  - not on meds at SNF  - FS monitoring with lispro coverage scale while critically ill    DERRICK on CKD  - Likely ATN due to above, possible HF as well  - monitor creatinine,   - avoid nephrotoxic agents    Prophylactic measure  - DVT proph: heparin SQ for now  - GI proph: protonix    D/w Dr Buster EMANUEL, Dr Zapien

## 2022-12-30 NOTE — PROGRESS NOTE ADULT - ASSESSMENT
REVIEW OF SYMPTOMS      Able to give (reliable) ROS  NO     PHYSICAL EXAM    HEENT Unremarkable  atraumatic   RESP Fair air entry EXP prolonged    Harsh breath sound Resp distres mild   CARDIAC S1 S2 No S3     NO JVD    ABDOMEN SOFT BS PRESENT NOT DISTENDED No hepatosplenomegaly   PEDAL EDEMA present No calf tenderness  NO rash       GENERAL DATA .   GOC.   12/23/2022 full code  ALLGY.     nka                  WT.     12/24/2022 81           BMI.       12/24/2022 26          ICU STAY. .. 12/23/2022  COVID. ..  12/23/2022 scv2 (-)   BEST PRACTICE ISSUES.    HOB ELEVATN. Yes  DVT PPLX. ..    12/23/2022 hpsc   WHITMORE PPLX. ..    12/23/2022 protonix 40   INFN PPLX. ..  12/23/2022 chlorhexidine .12%   SP SW VIVIAN.         DIET.  .. 12/26 nepro 960 ng    IV fl... 12/28; 1/2 ns 75   PROCEDURES...   12/23/2022  l chest tube   12/23/2022 intubated   12/23/2022 reed     PAST PROCEDURES.  .  12/25/2022 glucerna 960 ng .     ABGS.  12/25/2022 ac 40% 743/44/108   12/24/2022 ac 60% 746/41/190   12/23/2022 11 a 100% 705/95/68     VS/ PO/IO/ VENT/ DRIPS.   12/30/2022 afeb 70 90/70   12/30/2022 ac 20/400/5/.35     PREV ADMISSION 2/11-2/25/2022 NW P    AGE doa cc.  62 m doa 12/23/2022 resp distress    MAIN ISSUES.  CAC 12/23/2022 rosc 25 min   Intubation 12/23/2022  Vent mgmt 12/23/2022   sedation.  Pneumothorax... 12/23/2022 cxr tension pntx-> ct  Infection.  Possible aspn pneum 12/23/2022  Possible uti 12/23/2022   .. ua 12/23/2022 w 26-50   .. mrsa 12/23 (+)   .. 12/24 et pseudo  .. 12/23 uc klebsiella   .. 12/23 zosyn   Lacticemia.  elevated lfts.  DERRICK.  Hypernatremia.  Anoxic encephalo    PMH  PMH COPD  PMH COVID (+) 2/11/2022   PMH S aureus bacteremia mssa 2/11   .. Ancef 2/12/2022 Dr Phan  [facility] AICD  PMH HFREF  .. ECHO 11/20/2021 ef 20%  PMH A fib (on eliquis)     HOME MEDS INCLUDE.  apixaba 5.2   sacubitril valsartan 24 26    bumetanide 2   spironolactone 25  protonix 40   amiodarone 200     PROBLEMS ASSESSMENT RECOMMENDATIONS.  Intubation 12/23/2022  Vent mgmnt.   .. bundle dsv dsbt ltvv pplat 30 (-) PO2 60 (+) ph 7.3 (+)  sedation.  .. 12/23/2022 fentanyl 100.4p   .. 12/26/2022 is not requiring sedation (anoxic encephalo)   Weaning.  .. 12/28/2022 failed cpap   COPD.  .. 12/23/2022 combivent .4     .. 12/23/2022 solumed 40.3 -> 12/24/2022 solumed 40   .. 12/23/2022 will taper to 40 on 12/24   .. cont rx  Code 12/23/2022    .. 12/23/2022 was  restless when he came in No defib  .. 12/23/2022 coded at 12.13 p pea arrest about 25 min rosc   .. 12/23/2022 monitor neuro recovery  .. 12/24/2022 eyes open gag (+) does not follow commands   Pneumothorax.  .. 12/23/2022 cxr tension pntx  .. 12/29/2022 ct ch unchanged small loculatd l pntx   decr abd gas and sc emphysema   .. 12/23/2022 chets tube inserted  .. cxr 12/24/2022 et tube ng tube cardiac device cm sm r effs and basal infiltr l chest tube npo pntx sc emphysema improvd   .. 12/24/2022 bleeding at chest tube insertion site and scant drainage   .. 12/24/2022 above chest tube issues dw Dr Borges and she felkt bleeding likely sec recent apixaba and no drainage likely sec to resolution of pntx  and advised observation   .. follow clinically and with imaging   .. 12/24/2022 plan is to wean off vent and then plan chest tube remoival   .. 12/26/2022 pt is unresponsive may not be weanable and may need trach peg and placement   .. 12/28/2022 will gemma Borges re trach and ct removal   Infection.   Possible aspn pneum 12/23/2022  Possible uti 12/23/2022   .. W 12/23- 12/24-12/25-12/26-12/27-12/28/2022   w 9.6 - 21 - 20- 13 - 14 - 12   .. pr 12/24-12/25/2022 pr 31- 24     .. ua 12/23/2022 w 26-50   .. ct cap 12/23 l chest tube sm l pntx chr small loculated r pl effs mod retropneumoperitonuem cw barotrauma   .. flu ab 12/23/2022 (-)  .. rsv 12/23/2022 (-)   .. bc 12/23 (-)   .. mrsa 12/23 (+)   .. 12/24 et pseudo  .. 12/23 uc klebsiella   .. 12/23 zosyn   .. 12/25/2022 Vanco 1 dose given by id   .. bsab follow cult   .. 12/24/2022 gemma Frazier To add vanco   Lacticemia.  .. la 12/23-12/24/2022 la 3.2  - 1.9   .. Fluids and monitor  CAD.  .. Tr 12/24-12/25/2022 Tr 259 -139  .. 12/23/2022 asa 81   .. monitor   CHF.  .. bnp 12/25-12/27/2022 70k - 51k   .. ho chf   .. chf meds to be reintroduced by cardio as allowed by bp   elevated lfts.  .. LFTS 12/23-12/24-12/25-12/26-12/27/2022       - 390-312- 306- 287     - 9964 - 1766- 670- 212      - 2066 - 2405 - 1824- 1041  .. 12/23/2022  ct cap pneumoretroperitoneum cw barotrauma   .. 12/23/2022 check hep profile   .. 12/23/2022 follow serially  .. elevcated lfts likely sec to anoxic hepatopathy during cac   DERRICK.  .. Cr 12/23-12/24-12/25-12/26-12/27-12/28-12/29-12/30/2022   Cr 1.7- 3.1- 4 - 4.5 - 4.2-4 - 3.3- 3.8    .. uo 12/25-12/26/2022 400  - 500    .. fluids and serial monitoring  .. 12/25/2022 renal calld   .. 12/26/2022 seen Dr Escalante feels derrick sec ATN recommended d5w   Hypernatremia.  .. Na 12/24-12/25-12/26/2022 Na 147 - 148 - 147  .. 12/26/2022 iv changed to d5 50    .. 12/29 free watr 250./4   AMS.  .. 12/25/2022 remains unresponsive   .. 12/25/2022 Nurse tells me that no sedation is being required   .. 12/23/2022 ct head(-)   .. 12/25/2022 neuro consultd   .. 12/26/2022 pt seen by Dr Hyatt To repeat ct in 72h  .. 12/29/2022 pt is much more awake and is moving l arm     TIME SPENT   Over 39 minutes aggregate critical care time spent on encounter; activities included   direct patient care, counseling and/or coordinating care reviewing notes, lab data/ imaging , discussion with multidisciplinary team/ patient  /family and explaining in detail risks, benefits, alternatives  of the recommendations     BIPIN DE LA CRUZ 61 m 12/23/2022 1962 DR KELVIN VANESSA

## 2022-12-30 NOTE — PROGRESS NOTE ADULT - ASSESSMENT
1.  DERRICK due to ischemic ATN - maintenance phase - non oliguric  2.  Acute respiratory failure and PEA arrest with ROSC  3.  Mild hypernatremia  4.  Status post large left sided tension pneumothorax  5.  Diabetes    Repeat labs today. Improving renal indices. To continue current meds. Neurology follow up. Avoid nephrotoxic meds as possible.   Monitor blood sugar levels. Insulin coverage as needed. Free water with tube feeds. Will follow electrolytes and renal function trend.

## 2022-12-30 NOTE — PROGRESS NOTE ADULT - SUBJECTIVE AND OBJECTIVE BOX
Chief Complaint: Respiratory failure    Interval Events: No events overnight.    Review of Systems:  Unable to obtain    Physical Exam:  Vital Signs Last 24 Hrs  T(C): 36.6 (30 Dec 2022 07:41), Max: 37.2 (30 Dec 2022 04:06)  T(F): 97.9 (30 Dec 2022 07:41), Max: 98.9 (30 Dec 2022 04:06)  HR: 82 (30 Dec 2022 10:21) (65 - 104)  BP: 108/84 (30 Dec 2022 10:00) (99/80 - 121/80)  BP(mean): 92 (30 Dec 2022 10:00) (80 - 95)  RR: 14 (30 Dec 2022 10:00) (14 - 25)  SpO2: 99% (30 Dec 2022 10:21) (87% - 100%)  Parameters below as of 30 Dec 2022 10:00  Patient On (Oxygen Delivery Method): ventilator,CPAP mode  General: NAD  HEENT: MMM  Neck: No JVD, no carotid bruit  Lungs: CTAB  CV: RRR, nl S1/S2, no M/R/G  Abdomen: S/NT/ND, +BS  Extremities: No LE edema, no cyanosis  Neuro: AAOx0  Skin: No rash    Labs:    12-29    144  |  109<H>  |  123<H>  ----------------------------<  122<H>  4.3   |  24  |  3.93<H>    Ca    8.3<L>      29 Dec 2022 12:29    TPro  5.8<L>  /  Alb  2.3<L>  /  TBili  1.4<H>  /  DBili  x   /  AST  80<H>  /  ALT  411<H>  /  AlkPhos  295<H>  12-29                        15.5   11.96 )-----------( 116      ( 29 Dec 2022 12:29 )             47.6       ECG/Telemetry: Sinus rhythm

## 2022-12-30 NOTE — CONSULT NOTE ADULT - SUBJECTIVE AND OBJECTIVE BOX
Patient admitted with pneumothorax and rtesultand cardiac and respiratory failure. acute change in mental status as well.  Chest tube in place and will remain until either weaned or trached.

## 2022-12-30 NOTE — PROGRESS NOTE ADULT - SUBJECTIVE AND OBJECTIVE BOX
Date/Time Patient Seen:  		  Referring MD:   Data Reviewed	       Patient is a 62y old  Male who presents with a chief complaint of resp failure (29 Dec 2022 20:32)      Subjective/HPI     PAST MEDICAL & SURGICAL HISTORY:  Heart failure, systolic    CAD (coronary artery disease)    Hypertension    Nonischemic cardiomyopathy    COPD, moderate    2019 novel coronavirus disease (COVID-19)    Substance abuse    HLD (hyperlipidemia)    Cardiac LV ejection fraction 10-20%    No significant past surgical history    AICD (automatic cardioverter/defibrillator) present    Cardiac LV ejection fraction 10-20%          Medication list         MEDICATIONS  (STANDING):  albuterol    90 MICROgram(s) HFA Inhaler 2 Puff(s) Inhalation every 6 hours  albuterol/ipratropium for Nebulization. 3 milliLiter(s) Nebulizer once  aspirin  chewable 81 milliGRAM(s) Oral daily  chlorhexidine 0.12% Liquid 15 milliLiter(s) Oral Mucosa every 12 hours  dextrose 5%. 1000 milliLiter(s) (100 mL/Hr) IV Continuous <Continuous>  dextrose 50% Injectable 25 Gram(s) IV Push once  dextrose 50% Injectable 12.5 Gram(s) IV Push once  dextrose 50% Injectable 25 Gram(s) IV Push once  glucagon  Injectable 1 milliGRAM(s) IntraMuscular once  heparin   Injectable 5000 Unit(s) SubCutaneous every 12 hours  insulin lispro (ADMELOG) corrective regimen sliding scale   SubCutaneous every 6 hours  ipratropium 17 MICROgram(s) HFA Inhaler 1 Puff(s) Inhalation every 6 hours  lactulose Syrup 20 Gram(s) Oral two times a day  mupirocin 2% Nasal 1 Application(s) Both Nostrils two times a day  pantoprazole  Injectable 40 milliGRAM(s) IV Push daily  piperacillin/tazobactam IVPB.. 3.375 Gram(s) IV Intermittent every 12 hours  sodium chloride 0.45%. 1000 milliLiter(s) (75 mL/Hr) IV Continuous <Continuous>    MEDICATIONS  (PRN):  dextrose Oral Gel 15 Gram(s) Oral once PRN Blood Glucose LESS THAN 70 milliGRAM(s)/deciliter  fentaNYL    Injectable 100 MICROGram(s) IV Push every 4 hours PRN MV synchr         Vitals log        ICU Vital Signs Last 24 Hrs  T(C): 37.2 (30 Dec 2022 04:06), Max: 37.2 (30 Dec 2022 04:06)  T(F): 98.9 (30 Dec 2022 04:06), Max: 98.9 (30 Dec 2022 04:06)  HR: 85 (30 Dec 2022 05:35) (65 - 104)  BP: 104/80 (30 Dec 2022 04:00) (101/70 - 121/80)  BP(mean): 88 (30 Dec 2022 04:00) (80 - 103)  ABP: --  ABP(mean): --  RR: 20 (30 Dec 2022 04:00) (13 - 25)  SpO2: 100% (30 Dec 2022 05:35) (87% - 100%)    O2 Parameters below as of 30 Dec 2022 04:00  Patient On (Oxygen Delivery Method): ventilator    O2 Concentration (%): 35         Mode: AC/ CMV (Assist Control/ Continuous Mandatory Ventilation)  RR (machine): 20  TV (machine): 400  FiO2: 35  PEEP: 5  ITime: 1  MAP: 10  PIP: 21      Input and Output:  I&O's Detail    28 Dec 2022 07:01  -  29 Dec 2022 07:00  --------------------------------------------------------  IN:    Enteral Tube Flush: 360 mL    IV PiggyBack: 100 mL    Nepro with Carb Steady: 480 mL    sodium chloride 0.45%: 900 mL  Total IN: 1840 mL    OUT:    Indwelling Catheter - Urethral (mL): 350 mL  Total OUT: 350 mL    Total NET: 1490 mL      29 Dec 2022 07:01  -  30 Dec 2022 06:36  --------------------------------------------------------  IN:    Enteral Tube Flush: 500 mL    IV PiggyBack: 100 mL    Nepro with Carb Steady: 400 mL    sodium chloride 0.45%: 1200 mL  Total IN: 2200 mL    OUT:    Indwelling Catheter - Urethral (mL): 1250 mL  Total OUT: 1250 mL    Total NET: 950 mL          Lab Data                        15.5   11.96 )-----------( 116      ( 29 Dec 2022 12:29 )             47.6     12-29    144  |  109<H>  |  123<H>  ----------------------------<  122<H>  4.3   |  24  |  3.93<H>    Ca    8.3<L>      29 Dec 2022 12:29    TPro  5.8<L>  /  Alb  2.3<L>  /  TBili  1.4<H>  /  DBili  x   /  AST  80<H>  /  ALT  411<H>  /  AlkPhos  295<H>  12-29            Review of Systems	      Objective     Physical Examination    heart s1s2  lung dec BS  head nc      Pertinent Lab findings & Imaging      Leo:  NO   Adequate UO     I&O's Detail    28 Dec 2022 07:01  -  29 Dec 2022 07:00  --------------------------------------------------------  IN:    Enteral Tube Flush: 360 mL    IV PiggyBack: 100 mL    Nepro with Carb Steady: 480 mL    sodium chloride 0.45%: 900 mL  Total IN: 1840 mL    OUT:    Indwelling Catheter - Urethral (mL): 350 mL  Total OUT: 350 mL    Total NET: 1490 mL      29 Dec 2022 07:01  -  30 Dec 2022 06:36  --------------------------------------------------------  IN:    Enteral Tube Flush: 500 mL    IV PiggyBack: 100 mL    Nepro with Carb Steady: 400 mL    sodium chloride 0.45%: 1200 mL  Total IN: 2200 mL    OUT:    Indwelling Catheter - Urethral (mL): 1250 mL  Total OUT: 1250 mL    Total NET: 950 mL               Discussed with:     Cultures:	        Radiology

## 2022-12-30 NOTE — PROGRESS NOTE ADULT - SUBJECTIVE AND OBJECTIVE BOX
[facility], Division of Infectious Diseases  MARINA Mccray Y. Patel, S. Shah, [facility]  [phone]    Name: DOROTHY VOSS  Age: 62y  Gender: Male  MRN: [id number]    Interval History:  Patient seen and examined at bedside in [facility]  Continues to remain intubated  No acute overnight events. Afebrile  Notes reviewed    Antibiotics:  piperacillin/tazobactam IVPB.. 3.375 Gram(s) IV Intermittent every 12 hours      Medications:  albuterol    90 MICROgram(s) HFA Inhaler 2 Puff(s) Inhalation every 6 hours  albuterol/ipratropium for Nebulization. 3 milliLiter(s) Nebulizer once  aMIOdarone    Tablet 200 milliGRAM(s) Oral daily  aspirin  chewable 81 milliGRAM(s) Oral daily  chlorhexidine 0.12% Liquid 15 milliLiter(s) Oral Mucosa every 12 hours  dextrose 5%. 1000 milliLiter(s) IV Continuous <Continuous>  dextrose 50% Injectable 25 Gram(s) IV Push once  dextrose 50% Injectable 12.5 Gram(s) IV Push once  dextrose 50% Injectable 25 Gram(s) IV Push once  dextrose Oral Gel 15 Gram(s) Oral once PRN  fentaNYL    Injectable 100 MICROGram(s) IV Push every 4 hours PRN  glucagon  Injectable 1 milliGRAM(s) IntraMuscular once  heparin   Injectable 5000 Unit(s) SubCutaneous every 12 hours  insulin lispro (ADMELOG) corrective regimen sliding scale   SubCutaneous every 6 hours  ipratropium 17 MICROgram(s) HFA Inhaler 1 Puff(s) Inhalation every 6 hours  lactulose Syrup 20 Gram(s) Oral two times a day  mupirocin 2% Nasal 1 Application(s) Both Nostrils two times a day  pantoprazole  Injectable 40 milliGRAM(s) IV Push daily  piperacillin/tazobactam IVPB.. 3.375 Gram(s) IV Intermittent every 12 hours  sodium chloride 0.45%. 1000 milliLiter(s) IV Continuous <Continuous>      Review of Systems:  unable to obtain    Allergies: No Known Allergies    For details regarding the patient's past medical history, social history, family history, and other miscellaneous elements, please refer the initial infectious diseases consultation and/or the admitting history and physical examination for this admission.    Objective:  Vitals:   T(C): 36.5 (12-30-22 @ 12:27), Max: 37.2 (12-30-22 @ 04:06)  HR: 82 (12-30-22 @ 10:21) (65 - 104)  BP: 108/84 (12-30-22 @ 10:00) (99/80 - 118/83)  RR: 14 (12-30-22 @ 10:00) (14 - 21)  SpO2: 99% (12-30-22 @ 10:21) (87% - 100%)    Physical Examination:  General:  intubated  HEENT: NC/AT, ETT in place  Lungs: MV breath sounds  Chest: L CT in place  Heart: RRR  Abdomen: Soft.   Extremities: No cyanosis or clubbing. No edema.     Laboratory Studies:  CBC:                       15.5   11.96 )-----------( 116      ( 29 Dec 2022 12:29 )             47.6     CMP: 12-29    144  |  109<H>  |  123<H>  ----------------------------<  122<H>  4.3   |  24  |  3.93<H>    Ca    8.3<L>      29 Dec 2022 12:29    TPro  5.8<L>  /  Alb  2.3<L>  /  TBili  1.4<H>  /  DBili  x   /  AST  80<H>  /  ALT  411<H>  /  AlkPhos  295<H>  12-29    LIVER FUNCTIONS - ( 29 Dec 2022 12:29 )  Alb: 2.3 g/dL / Pro: 5.8 g/dL / ALK PHOS: 295 U/L / ALT: 411 U/L DA / AST: 80 U/L / GGT: x               Microbiology: reviewed    Culture - Sputum (collected 12-24-22 @ 17:43)  Source: ET Tube ET Tube  Gram Stain (12-25-22 @ 07:58):    Numerous polymorphonuclear leukocytes per low power field    No Squamous epithelial cells per low power field    Rare Gram Positive Rods seen per oil power field  Final Report (12-26-22 @ 16:49):    Rare Pseudomonas aeruginosa    Normal Respiratory Cathy absent  Organism: Pseudomonas aeruginosa (12-26-22 @ 16:49)  Organism: Pseudomonas aeruginosa (12-26-22 @ 16:49)      -  Amikacin: S <=16      -  Aztreonam: S 8      -  Cefepime: S <=2      -  Ceftazidime: S 4      -  Ciprofloxacin: S <=0.25      -  Gentamicin: S 4      -  Imipenem: S <=1      -  Levofloxacin: S <=0.5      -  Meropenem: S <=1      -  Piperacillin/Tazobactam: S <=8      -  Tobramycin: S <=2      Method Type: BAY    Culture - Blood (collected 12-23-22 @ 14:06)  Source: .Blood Blood-Peripheral  Final Report (12-28-22 @ 19:01):    No Growth Final    Culture - Urine (collected 12-23-22 @ 11:16)  Source: Clean Catch Clean Catch (Midstream)  Final Report (12-26-22 @ 07:02):    >100,000 CFU/ml Klebsiella oxytoca/Raoultella ornithinolytica  Organism: Klebsiella oxytoca /Raoutella ornithinolytica (12-26-22 @ 07:02)  Organism: Klebsiella oxytoca /Raoutella ornithinolytica (12-26-22 @ 07:02)      -  Amikacin: S <=16      -  Amoxicillin/Clavulanic Acid: S <=8/4      -  Ampicillin: R 16 These ampicillin results predict results for amoxicillin      -  Ampicillin/Sulbactam: S <=4/2 Enterobacter, Klebsiella aerogenes, Citrobacter, and Serratia may develop resistance during prolonged therapy (3-4 days)      -  Aztreonam: S <=4      -  Cefazolin: S <=2      -  Cefepime: S <=2      -  Cefoxitin: S <=8      -  Ceftriaxone: S <=1 Enterobacter, Klebsiella aerogenes, Citrobacter, and Serratia may develop resistance during prolonged therapy      -  Ciprofloxacin: S <=0.25      -  Ertapenem: S <=0.5      -  Gentamicin: S <=2      -  Imipenem: S <=1      -  Levofloxacin: S <=0.5      -  Meropenem: S <=1      -  Nitrofurantoin: S <=32 Should not be used to treat pyelonephritis      -  Piperacillin/Tazobactam: S <=8      -  Tobramycin: S <=2      -  Trimethoprim/Sulfamethoxazole: S <=0.5/9.5      Method Type: BAY    Culture - Blood (collected 12-23-22 @ 10:17)  Source: .Blood Blood-Peripheral  Final Report (12-28-22 @ 18:01):    No Growth Final          Radiology: reviewed

## 2022-12-30 NOTE — PROGRESS NOTE ADULT - SUBJECTIVE AND OBJECTIVE BOX
[facility]OSS    [facility]    Allergies    No Known Allergies    Intolerances    pork (Other)      PAST MEDICAL & SURGICAL HISTORY:  Heart failure, systolic      CAD (coronary artery disease)      Hypertension      Nonischemic cardiomyopathy      COPD, moderate      2019 novel coronavirus disease (COVID-19)      Substance abuse      HLD (hyperlipidemia)      Cardiac LV ejection fraction 10-20%      AICD (automatic cardioverter/defibrillator) present      Cardiac LV ejection fraction 10-20%          FAMILY HISTORY:  FH: lung cancer        Home Medications:  acetaminophen 325 mg oral tablet: 2 tab(s) orally every 6 hours, As needed, Temp greater or equal to 38C (100.4F), Mild Pain (1 - 3) (23 Dec 2022 10:07)  apixaban 5 mg oral tablet: 1 tab(s) orally every 12 hours (23 Dec 2022 10:07)  Aquaphor Healing topical ointment: Apply topically to affected area once a day (23 Dec 2022 10:07)  ascorbic acid 500 mg oral tablet: 1 tab(s) orally once a day (23 Dec 2022 10:07)  Bacid (LAC) oral capsule: 2 cap(s) orally once a day (23 Dec 2022 10:07)  bumetanide 2 mg oral tablet: 1 tab(s) orally 2 times a day (23 Dec 2022 10:07)  gabapentin 100 mg oral capsule: 1 cap(s) orally 3 times a day (23 Dec 2022 10:07)  melatonin 3 mg oral tablet: 1 tab(s) orally once a day (at bedtime), As needed, Insomnia (23 Dec 2022 10:07)  Multiple Vitamins with Minerals oral tablet: 1 tab(s) orally once a day (23 Dec 2022 10:07)  pantoprazole 40 mg oral delayed release tablet: 1 tab(s) orally once a day (before a meal) (23 Dec 2022 10:07)  sacubitril-valsartan 24 mg-26 mg oral tablet: 1 tab(s) orally 2 times a day (23 Dec 2022 10:07)  simethicone 80 mg oral tablet: 2 tab(s) orally every 6 hours, As Needed (23 Dec 2022 10:07)  spironolactone 25 mg oral tablet: 1 tab(s) orally once a day (23 Dec 2022 10:07)  Symbicort 160 mcg-4.5 mcg/inh inhalation aerosol: 2 puff(s) inhaled 2 times a day (23 Dec 2022 10:07)  Ventolin HFA 90 mcg/inh inhalation aerosol: 2 puff(s) inhaled every 6 hours, As Needed (23 Dec 2022 10:07)      MEDICATIONS  (STANDING):  albuterol    90 MICROgram(s) HFA Inhaler 2 Puff(s) Inhalation every 6 hours  albuterol/ipratropium for Nebulization. 3 milliLiter(s) Nebulizer once  aspirin  chewable 81 milliGRAM(s) Oral daily  chlorhexidine 0.12% Liquid 15 milliLiter(s) Oral Mucosa every 12 hours  dextrose 5%. 1000 milliLiter(s) (100 mL/Hr) IV Continuous <Continuous>  dextrose 50% Injectable 25 Gram(s) IV Push once  dextrose 50% Injectable 12.5 Gram(s) IV Push once  dextrose 50% Injectable 25 Gram(s) IV Push once  glucagon  Injectable 1 milliGRAM(s) IntraMuscular once  heparin   Injectable 5000 Unit(s) SubCutaneous every 12 hours  insulin lispro (ADMELOG) corrective regimen sliding scale   SubCutaneous every 6 hours  ipratropium 17 MICROgram(s) HFA Inhaler 1 Puff(s) Inhalation every 6 hours  lactulose Syrup 20 Gram(s) Oral two times a day  mupirocin 2% Nasal 1 Application(s) Both Nostrils two times a day  pantoprazole  Injectable 40 milliGRAM(s) IV Push daily  piperacillin/tazobactam IVPB.. 3.375 Gram(s) IV Intermittent every 12 hours  sodium chloride 0.45%. 1000 milliLiter(s) (75 mL/Hr) IV Continuous <Continuous>    MEDICATIONS  (PRN):  dextrose Oral Gel 15 Gram(s) Oral once PRN Blood Glucose LESS THAN 70 milliGRAM(s)/deciliter  fentaNYL    Injectable 100 MICROGram(s) IV Push every 4 hours PRN MV synchr      Diet, NPO with Tube Feed:   Tube Feeding Modality: Nasogastric  Nepro with Carb Steady  Total Volume for 24 Hours (mL): 960  Continuous  Starting Tube Feed Rate mL per Hour: 20  Increase Tube Feed Rate by (mL): 10     Every 4 hours  Until Goal Tube Feed Rate (mL per Hour): 40  Tube Feed Duration (in Hours): 24  Tube Feed Start Time: 13:00  Free Water Flush  Pump   Rate (mL per Hour): 30 (12-26-22 @ 23:12) [Active]          Vital Signs Last 24 Hrs  T(C): 37.2 (30 Dec 2022 04:06), Max: 37.2 (30 Dec 2022 04:06)  T(F): 98.9 (30 Dec 2022 04:06), Max: 98.9 (30 Dec 2022 04:06)  HR: 85 (30 Dec 2022 06:00) (65 - 104)  BP: 109/80 (30 Dec 2022 06:00) (101/70 - 121/80)  BP(mean): 90 (30 Dec 2022 06:00) (80 - 103)  RR: 20 (30 Dec 2022 06:00) (13 - 25)  SpO2: 99% (30 Dec 2022 06:00) (87% - 100%)    Parameters below as of 30 Dec 2022 06:00  Patient On (Oxygen Delivery Method): ventilator    O2 Concentration (%): 35      12-29-22 @ 07:01  -  12-30-22 @ 07:00  --------------------------------------------------------  IN: 2200 mL / OUT: 1250 mL / NET: 950 mL        Mode: AC/ CMV (Assist Control/ Continuous Mandatory Ventilation), RR (machine): 20, TV (machine): 400, FiO2: 35, PEEP: 5, ITime: 1, MAP: 10, PIP: 21      LABS:                        15.5   11.96 )-----------( 116      ( 29 Dec 2022 12:29 )             47.6     12-29    144  |  109<H>  |  123<H>  ----------------------------<  122<H>  4.3   |  24  |  3.93<H>    Ca    8.3<L>      29 Dec 2022 12:29    TPro  5.8<L>  /  Alb  2.3<L>  /  TBili  1.4<H>  /  DBili  x   /  AST  80<H>  /  ALT  411<H>  /  AlkPhos  295<H>  12-29              WBC:  WBC Count: 11.96 K/uL (12-29 @ 12:29)  WBC Count: 12.51 K/uL (12-28 @ 06:40)  WBC Count: 14.34 K/uL (12-27 @ 06:36)      MICROBIOLOGY:  RECENT CULTURES:  12-24 ET Tube ET Tube Pseudomonas aeruginosa   Numerous polymorphonuclear leukocytes per low power field  No Squamous epithelial cells per low power field  Rare Gram Positive Rods seen per oil power field   Rare Pseudomonas aeruginosa  Normal Respiratory Cathy absent    12-23 .Blood Blood-Peripheral XXXX XXXX   No Growth Final    12-23 Clean Catch Clean Catch (Midstream) Klebsiella oxytoca /Raoutella ornithinolytica XXXX   >100,000 CFU/ml Klebsiella oxytoca/Raoultella ornithinolytica    12-23 .Blood Blood-Peripheral XXXX XXXX   No Growth Final                    Sodium:  Sodium, Serum: 144 mmol/L (12-29 @ 12:29)  Sodium, Serum: 144 mmol/L (12-28 @ 06:40)  Sodium, Serum: 147 mmol/L (12-27 @ 06:36)      3.93 mg/dL 12-29 @ 12:29  4.04 mg/dL 12-28 @ 06:40  4.20 mg/dL 12-27 @ 06:36      Hemoglobin:  Hemoglobin: 15.5 g/dL (12-29 @ 12:29)  Hemoglobin: 15.6 g/dL (12-28 @ 06:40)  Hemoglobin: 16.4 g/dL (12-27 @ 06:36)      Platelets: Platelet Count - Automated: 116 K/uL (12-29 @ 12:29)  Platelet Count - Automated: 136 K/uL (12-28 @ 06:40)  Platelet Count - Automated: 157 K/uL (12-27 @ 06:36)      LIVER FUNCTIONS - ( 29 Dec 2022 12:29 )  Alb: 2.3 g/dL / Pro: 5.8 g/dL / ALK PHOS: 295 U/L / ALT: 411 U/L DA / AST: 80 U/L / GGT: x                 RADIOLOGY & ADDITIONAL STUDIES:      MICROBIOLOGY:  RECENT CULTURES:  12-24 ET Tube ET Tube Pseudomonas aeruginosa   Numerous polymorphonuclear leukocytes per low power field  No Squamous epithelial cells per low power field  Rare Gram Positive Rods seen per oil power field   Rare Pseudomonas aeruginosa  Normal Respiratory Cathy absent    12-23 .Blood Blood-Peripheral XXXX XXXX   No Growth Final    12-23 Clean Catch Clean Catch (Midstream) Klebsiella oxytoca /Raoutella ornithinolytica XXXX   >100,000 CFU/ml Klebsiella oxytoca/Raoultella ornithinolytica    12-23 .Blood Blood-Peripheral XXXX XXXX   No Growth Final

## 2022-12-30 NOTE — PROGRESS NOTE ADULT - ASSESSMENT
Pt is a 62M CAD, Dilated cardiomyopathy, S/p AICD, Afib/flutter, h/o substance abuse, CKD baseline creat approx 1.4 last admitted to [facility] with MSSA bacteremia, and CHF.    Sent from [facility] with acute hypoxic respiratory failure. Was initially on BiPAP then became hypoxic.  Anesthesia intubated patient.  On post intubation Xray pneumothorax evident.  Patient with PEA arrest.  Chest tube placed by ED physician.  ROSC obtained.       S/p PEA arrest  AHRF requiring intubation  Aspiration PNA   Loculated pleural effusions  - imaging reviewed as above  - CT reviewed  - BCx NGTD  - Sputum Cx -- Pseudomonas  - UCx -- Klebsiella  - susceptibilities reviewed  - c/w zosyn, plan x10 day course until 1/1/2023  - trend temps/WBC--elevated but stable  - maintain aspiration precautions  - ICU care    GOC per primary team    Dr. Indu Rivas covering weekend service through 1/2/23  Infectious Diseases will continue to follow. Please call with any questions.   Shila Frazier M.D.  [facility] Division of Infectious Diseases [phone]

## 2022-12-30 NOTE — PROGRESS NOTE ADULT - SUBJECTIVE AND OBJECTIVE BOX
Patient is a 62y old  Male who presents with a chief complaint of resp failure (29 Dec 2022 10:19)    Patient seen in follow up for DERRICK.        PAST MEDICAL HISTORY:  Heart failure, systolic    CAD (coronary artery disease)    Hypertension    Nonischemic cardiomyopathy    COPD, moderate    2019 novel coronavirus disease (COVID-19)    Substance abuse    HLD (hyperlipidemia)    Cardiac LV ejection fraction 10-20%    MEDICATIONS  (STANDING):  albuterol    90 MICROgram(s) HFA Inhaler 2 Puff(s) Inhalation every 6 hours  albuterol/ipratropium for Nebulization. 3 milliLiter(s) Nebulizer once  aMIOdarone    Tablet 200 milliGRAM(s) Oral daily  aspirin  chewable 81 milliGRAM(s) Oral daily  chlorhexidine 0.12% Liquid 15 milliLiter(s) Oral Mucosa every 12 hours  dextrose 5%. 1000 milliLiter(s) (100 mL/Hr) IV Continuous <Continuous>  dextrose 50% Injectable 25 Gram(s) IV Push once  dextrose 50% Injectable 12.5 Gram(s) IV Push once  dextrose 50% Injectable 25 Gram(s) IV Push once  glucagon  Injectable 1 milliGRAM(s) IntraMuscular once  heparin   Injectable 5000 Unit(s) SubCutaneous every 12 hours  insulin lispro (ADMELOG) corrective regimen sliding scale   SubCutaneous every 6 hours  ipratropium 17 MICROgram(s) HFA Inhaler 1 Puff(s) Inhalation every 6 hours  lactulose Syrup 20 Gram(s) Oral two times a day  mupirocin 2% Nasal 1 Application(s) Both Nostrils two times a day  pantoprazole  Injectable 40 milliGRAM(s) IV Push daily  piperacillin/tazobactam IVPB.. 3.375 Gram(s) IV Intermittent every 12 hours  sodium chloride 0.45%. 1000 milliLiter(s) (75 mL/Hr) IV Continuous <Continuous>    MEDICATIONS  (PRN):  dextrose Oral Gel 15 Gram(s) Oral once PRN Blood Glucose LESS THAN 70 milliGRAM(s)/deciliter  fentaNYL    Injectable 100 MICROGram(s) IV Push every 4 hours PRN MV synchr    T(C): 36.5 (12-30-22 @ 12:27), Max: 37.2 (12-30-22 @ 04:06)  HR: 80 (12-30-22 @ 13:17) (65 - 104)  BP: 116/81 (12-30-22 @ 13:00) (88/68 - 121/80)  RR: 14 (12-30-22 @ 13:00)  SpO2: 99% (12-30-22 @ 13:17)  Wt(kg): --  I&O's Detail    29 Dec 2022 07:01  -  30 Dec 2022 07:00  --------------------------------------------------------  IN:    Enteral Tube Flush: 500 mL    IV PiggyBack: 100 mL    Nepro with Carb Steady: 480 mL    sodium chloride 0.45%: 1350 mL  Total IN: 2430 mL    OUT:    Indwelling Catheter - Urethral (mL): 1250 mL  Total OUT: 1250 mL    Total NET: 1180 mL      30 Dec 2022 07:01  -  30 Dec 2022 14:09  --------------------------------------------------------  IN:    Enteral Tube Flush: 250 mL    Nepro with Carb Steady: 240 mL    sodium chloride 0.45%: 450 mL  Total IN: 940 mL    OUT:    Indwelling Catheter - Urethral (mL): 490 mL  Total OUT: 490 mL    Total NET: 450 mL          PHYSICAL EXAM:  General: on vent  Respiratory: b/l air entry  Cardiovascular: S1 S2  Gastrointestinal: soft  Extremities:  no edema    Mode: CPAP with PS, FiO2: 35, PEEP: 5, PS: 10, MAP: 10, PIP: 16  LABORATORY:                        15.5   11.96 )-----------( 116      ( 29 Dec 2022 12:29 )             47.6     12-29    144  |  109<H>  |  123<H>  ----------------------------<  122<H>  4.3   |  24  |  3.93<H>    Ca    8.3<L>      29 Dec 2022 12:29    TPro  5.8<L>  /  Alb  2.3<L>  /  TBili  1.4<H>  /  DBili  x   /  AST  80<H>  /  ALT  411<H>  /  AlkPhos  295<H>  12-29    Sodium, Serum: 144 mmol/L (12-29 @ 12:29)    Potassium, Serum: 4.3 mmol/L (12-29 @ 12:29)    Hemoglobin: 15.5 g/dL (12-29 @ 12:29)  Hemoglobin: 15.6 g/dL (12-28 @ 06:40)    Creatinine, Serum 3.93 (12-29 @ 12:29)  Creatinine, Serum 4.04 (12-28 @ 06:40)        LIVER FUNCTIONS - ( 29 Dec 2022 12:29 )  Alb: 2.3 g/dL / Pro: 5.8 g/dL / ALK PHOS: 295 U/L / ALT: 411 U/L DA / AST: 80 U/L / GGT: x

## 2022-12-31 NOTE — PROGRESS NOTE ADULT - SUBJECTIVE AND OBJECTIVE BOX
Chief Complaint: Respiratory failure    Interval Events: No events overnight.    Review of Systems:  Unable to obtain    Physical Exam:  Vital Signs Last 24 Hrs  T(C): 36.3 (31 Dec 2022 03:00), Max: 36.5 (30 Dec 2022 12:27)  T(F): 97.4 (31 Dec 2022 03:00), Max: 97.7 (30 Dec 2022 12:27)  HR: 72 (31 Dec 2022 06:00) (67 - 92)  BP: 97/69 (31 Dec 2022 06:00) (83/60 - 119/104)  BP(mean): 78 (31 Dec 2022 06:00) (69 - 111)  RR: 15 (31 Dec 2022 06:00) (13 - 23)  SpO2: 95% (31 Dec 2022 06:00) (90% - 100%)  Parameters below as of 31 Dec 2022 01:31  Patient On (Oxygen Delivery Method): ventilator,21%  General: Sedated  HEENT: Intubated  Neck: No JVD, no carotid bruit  Lungs: CTAB  CV: RRR, nl S1/S2, no M/R/G  Abdomen: S/NT/ND, +BS  Extremities: No LE edema, no cyanosis  Neuro: AAOx0  Skin: No rash    Labs:    12-31    138  |  104  |  115<H>  ----------------------------<  165<H>  3.5   |  26  |  3.88<H>    Ca    8.4      31 Dec 2022 06:32    TPro  5.3<L>  /  Alb  1.9<L>  /  TBili  1.1  /  DBili  x   /  AST  55<H>  /  ALT  236<H>  /  AlkPhos  250<H>  12-31                        13.2   11.25 )-----------( 102      ( 31 Dec 2022 06:32 )             40.4     ECG/Telemetry: Sinus rhythm

## 2022-12-31 NOTE — PROGRESS NOTE ADULT - SUBJECTIVE AND OBJECTIVE BOX
Patient is a 62y old  Male who presents with a chief complaint of resp failure (29 Dec 2022 10:19)    Patient seen in follow up for DERRICK.        PAST MEDICAL HISTORY:  Heart failure, systolic    CAD (coronary artery disease)    Hypertension    Nonischemic cardiomyopathy    COPD, moderate    2019 novel coronavirus disease (COVID-19)    Substance abuse    HLD (hyperlipidemia)    Cardiac LV ejection fraction 10-20%    MEDICATIONS  (STANDING):  albuterol    90 MICROgram(s) HFA Inhaler 2 Puff(s) Inhalation every 6 hours  albuterol/ipratropium for Nebulization. 3 milliLiter(s) Nebulizer once  aMIOdarone    Tablet 200 milliGRAM(s) Oral daily  aspirin  chewable 81 milliGRAM(s) Oral daily  chlorhexidine 0.12% Liquid 15 milliLiter(s) Oral Mucosa every 12 hours  dexMEDEtomidine Infusion 0.2 MICROgram(s)/kG/Hr (4.08 mL/Hr) IV Continuous <Continuous>  dextrose 5%. 1000 milliLiter(s) (100 mL/Hr) IV Continuous <Continuous>  dextrose 50% Injectable 25 Gram(s) IV Push once  dextrose 50% Injectable 12.5 Gram(s) IV Push once  dextrose 50% Injectable 25 Gram(s) IV Push once  glucagon  Injectable 1 milliGRAM(s) IntraMuscular once  heparin   Injectable 5000 Unit(s) SubCutaneous every 12 hours  insulin lispro (ADMELOG) corrective regimen sliding scale   SubCutaneous every 6 hours  ipratropium 17 MICROgram(s) HFA Inhaler 1 Puff(s) Inhalation every 6 hours  lactulose Syrup 20 Gram(s) Oral two times a day  mupirocin 2% Nasal 1 Application(s) Both Nostrils two times a day  pantoprazole  Injectable 40 milliGRAM(s) IV Push daily  piperacillin/tazobactam IVPB.. 3.375 Gram(s) IV Intermittent every 12 hours  sodium chloride 0.45%. 1000 milliLiter(s) (75 mL/Hr) IV Continuous <Continuous>    MEDICATIONS  (PRN):  dextrose Oral Gel 15 Gram(s) Oral once PRN Blood Glucose LESS THAN 70 milliGRAM(s)/deciliter    T(C): 36.4 (12-31-22 @ 08:00), Max: 37.2 (12-30-22 @ 04:06)  HR: 74 (12-31-22 @ 11:00) (65 - 104)  BP: 85/64 (12-31-22 @ 11:00) (83/60 - 119/104)  RR: 16 (12-31-22 @ 11:00)  SpO2: 96% (12-31-22 @ 11:00)  Wt(kg): --  I&O's Detail    30 Dec 2022 07:01  -  31 Dec 2022 07:00  --------------------------------------------------------  IN:    Dexmedetomidine: 29.4 mL    Enteral Tube Flush: 750 mL    IV PiggyBack: 200 mL    Nepro with Carb Steady: 960 mL    sodium chloride 0.45%: 1800 mL  Total IN: 3739.4 mL    OUT:    Chest Tube (mL): 0 mL    Indwelling Catheter - Urethral (mL): 1030 mL  Total OUT: 1030 mL    Total NET: 2709.4 mL                PHYSICAL EXAM:  General: on vent  Respiratory: b/l air entry  Cardiovascular: S1 S2  Gastrointestinal: soft  Extremities:  no edema    Mode: AC/ CMV (Assist Control/ Continuous Mandatory Ventilation), RR (machine): 20, TV (machine): 400, FiO2: 30, PEEP: 5, ITime: 1, MAP: 12, PIP: 29  LABORATORY:                        13.2   11.25 )-----------( 102      ( 31 Dec 2022 06:32 )             40.4     12-31    138  |  104  |  115<H>  ----------------------------<  165<H>  3.5   |  26  |  3.88<H>    Ca    8.4      31 Dec 2022 06:32    TPro  5.3<L>  /  Alb  1.9<L>  /  TBili  1.1  /  DBili  x   /  AST  55<H>  /  ALT  236<H>  /  AlkPhos  250<H>  12-31    Sodium, Serum: 138 mmol/L (12-31 @ 06:32)  Sodium, Serum: 142 mmol/L (12-30 @ 16:30)  Sodium, Serum: 144 mmol/L (12-29 @ 12:29)    Potassium, Serum: 3.5 mmol/L (12-31 @ 06:32)  Potassium, Serum: 3.6 mmol/L (12-30 @ 16:30)  Potassium, Serum: 4.3 mmol/L (12-29 @ 12:29)    Hemoglobin: 13.2 g/dL (12-31 @ 06:32)  Hemoglobin: 13.8 g/dL (12-30 @ 16:30)  Hemoglobin: 15.5 g/dL (12-29 @ 12:29)    Creatinine, Serum 3.88 (12-31 @ 06:32)  Creatinine, Serum 3.85 (12-30 @ 16:30)  Creatinine, Serum 3.93 (12-29 @ 12:29)        LIVER FUNCTIONS - ( 31 Dec 2022 06:32 )  Alb: 1.9 g/dL / Pro: 5.3 g/dL / ALK PHOS: 250 U/L / ALT: 236 U/L DA / AST: 55 U/L / GGT: x

## 2022-12-31 NOTE — PROGRESS NOTE ADULT - SUBJECTIVE AND OBJECTIVE BOX
Date/Time Patient Seen:  		  Referring MD:   Data Reviewed	       Patient is a 62y old  Male who presents with a chief complaint of resp failure (31 Dec 2022 12:12)      Subjective/HPI     PAST MEDICAL & SURGICAL HISTORY:  Heart failure, systolic    CAD (coronary artery disease)    Hypertension    Nonischemic cardiomyopathy    COPD, moderate    2019 novel coronavirus disease (COVID-19)    Substance abuse    HLD (hyperlipidemia)    Cardiac LV ejection fraction 10-20%    No significant past surgical history    AICD (automatic cardioverter/defibrillator) present    Cardiac LV ejection fraction 10-20%          Medication list         MEDICATIONS  (STANDING):  albuterol    90 MICROgram(s) HFA Inhaler 2 Puff(s) Inhalation every 6 hours  albuterol/ipratropium for Nebulization. 3 milliLiter(s) Nebulizer once  aMIOdarone    Tablet 200 milliGRAM(s) Oral daily  aspirin  chewable 81 milliGRAM(s) Oral daily  chlorhexidine 0.12% Liquid 15 milliLiter(s) Oral Mucosa every 12 hours  dexMEDEtomidine Infusion 0.2 MICROgram(s)/kG/Hr (4.08 mL/Hr) IV Continuous <Continuous>  dextrose 5%. 1000 milliLiter(s) (100 mL/Hr) IV Continuous <Continuous>  dextrose 50% Injectable 25 Gram(s) IV Push once  dextrose 50% Injectable 12.5 Gram(s) IV Push once  dextrose 50% Injectable 25 Gram(s) IV Push once  glucagon  Injectable 1 milliGRAM(s) IntraMuscular once  heparin   Injectable 5000 Unit(s) SubCutaneous every 12 hours  insulin lispro (ADMELOG) corrective regimen sliding scale   SubCutaneous every 6 hours  ipratropium 17 MICROgram(s) HFA Inhaler 1 Puff(s) Inhalation every 6 hours  lactulose Syrup 20 Gram(s) Oral two times a day  mupirocin 2% Nasal 1 Application(s) Both Nostrils two times a day  pantoprazole  Injectable 40 milliGRAM(s) IV Push daily  piperacillin/tazobactam IVPB.. 3.375 Gram(s) IV Intermittent every 12 hours  sodium chloride 0.45%. 1000 milliLiter(s) (75 mL/Hr) IV Continuous <Continuous>    MEDICATIONS  (PRN):  dextrose Oral Gel 15 Gram(s) Oral once PRN Blood Glucose LESS THAN 70 milliGRAM(s)/deciliter         Vitals log        ICU Vital Signs Last 24 Hrs  T(C): 36.4 (31 Dec 2022 08:00), Max: 36.4 (30 Dec 2022 19:00)  T(F): 97.5 (31 Dec 2022 08:00), Max: 97.6 (30 Dec 2022 19:00)  HR: 85 (31 Dec 2022 12:52) (67 - 92)  BP: 85/64 (31 Dec 2022 11:00) (83/60 - 119/104)  BP(mean): 71 (31 Dec 2022 11:00) (69 - 111)  ABP: --  ABP(mean): --  RR: 16 (31 Dec 2022 11:00) (13 - 23)  SpO2: 98% (31 Dec 2022 12:52) (90% - 100%)    O2 Parameters below as of 31 Dec 2022 01:31  Patient On (Oxygen Delivery Method): ventilator,21%             Mode: AC/ CMV (Assist Control/ Continuous Mandatory Ventilation)  RR (machine): 20  TV (machine): 400  FiO2: 30  PEEP: 5  ITime: 1  MAP: 12  PIP: 29      Input and Output:  I&O's Detail    30 Dec 2022 07:01  -  31 Dec 2022 07:00  --------------------------------------------------------  IN:    Dexmedetomidine: 29.4 mL    Enteral Tube Flush: 750 mL    IV PiggyBack: 200 mL    Nepro with Carb Steady: 960 mL    sodium chloride 0.45%: 1800 mL  Total IN: 3739.4 mL    OUT:    Chest Tube (mL): 0 mL    Indwelling Catheter - Urethral (mL): 1030 mL  Total OUT: 1030 mL    Total NET: 2709.4 mL          Lab Data                        13.2   11.25 )-----------( 102      ( 31 Dec 2022 06:32 )             40.4     12-31    138  |  104  |  115<H>  ----------------------------<  165<H>  3.5   |  26  |  3.88<H>    Ca    8.4      31 Dec 2022 06:32    TPro  5.3<L>  /  Alb  1.9<L>  /  TBili  1.1  /  DBili  x   /  AST  55<H>  /  ALT  236<H>  /  AlkPhos  250<H>  12-31            Review of Systems	      Objective     Physical Examination    heart s1s2  lung dec BS  head nc      Pertinent Lab findings & Imaging      Leo:  NO   Adequate UO     I&O's Detail    30 Dec 2022 07:01  -  31 Dec 2022 07:00  --------------------------------------------------------  IN:    Dexmedetomidine: 29.4 mL    Enteral Tube Flush: 750 mL    IV PiggyBack: 200 mL    Nepro with Carb Steady: 960 mL    sodium chloride 0.45%: 1800 mL  Total IN: 3739.4 mL    OUT:    Chest Tube (mL): 0 mL    Indwelling Catheter - Urethral (mL): 1030 mL  Total OUT: 1030 mL    Total NET: 2709.4 mL               Discussed with:     Cultures:	        Radiology

## 2022-12-31 NOTE — PROGRESS NOTE ADULT - SUBJECTIVE AND OBJECTIVE BOX
[facility]  MARINA Mccray Y. Patel, S. Shah, G. Deni  [phone]  ([phone] - weekdays after 5pm and weekends)    Name: DOROTHY VOSS  Age/Gender: 62y Male  MRN: [id number]    Interval History:  Patient seen and examined   Intubated, awakens, unable to obtain ROS.   Notes reviewed. Afebrile   Allergies: No Known Allergies      Objective:  Vitals:   T(F): 98.3 (12-31-22 @ 21:52), Max: 98.3 (12-31-22 @ 21:52)  HR: 75 (12-31-22 @ 22:03) (67 - 104)  BP: 92/65 (12-31-22 @ 22:03) (83/60 - 137/83)  RR: 19 (12-31-22 @ 22:03) (13 - 31)  SpO2: 100% (12-31-22 @ 22:03) (90% - 100%)  Physical Examination:  General: no acute distress, intubated  HEENT: NC/AT, anicteric, ETT in place  Respiratory: decreased breath sounds b/l  Cardiovascular: S1 and S2 present, normal rate   Gastrointestinal: soft, nondistended  Extremities: no edema, no cyanosis  Skin: no visible rash    Laboratory Studies:  CBC:                       13.2   11.25 )-----------( 102      ( 31 Dec 2022 06:32 )             40.4     WBC Trend:  11.25 12-31-22 @ 06:32  13.69 12-30-22 @ 16:30  11.96 12-29-22 @ 12:29  12.51 12-28-22 @ 06:40  14.34 12-27-22 @ 06:36  13.56 12-26-22 @ 06:46  20.28 12-25-22 @ 06:27    CMP: 12-31    138  |  104  |  115<H>  ----------------------------<  165<H>  3.5   |  26  |  3.88<H>    Ca    8.4      31 Dec 2022 06:32    TPro  5.3<L>  /  Alb  1.9<L>  /  TBili  1.1  /  DBili  x   /  AST  55<H>  /  ALT  236<H>  /  AlkPhos  250<H>  12-31    Creatinine, Serum: 3.88 mg/dL (12-31-22 @ 06:32)  Creatinine, Serum: 3.85 mg/dL (12-30-22 @ 16:30)  Creatinine, Serum: 3.93 mg/dL (12-29-22 @ 12:29)  Creatinine, Serum: 4.04 mg/dL (12-28-22 @ 06:40)  Creatinine, Serum: 4.20 mg/dL (12-27-22 @ 06:36)  Creatinine, Serum: 4.58 mg/dL (12-26-22 @ 06:46)  Creatinine, Serum: 4.04 mg/dL (12-25-22 @ 06:27)    LIVER FUNCTIONS - ( 31 Dec 2022 06:32 )  Alb: 1.9 g/dL / Pro: 5.3 g/dL / ALK PHOS: 250 U/L / ALT: 236 U/L DA / AST: 55 U/L / GGT: x           Microbiology: reviewed   Culture - Sputum (collected 12-24-22 @ 17:43)  Source: ET Tube ET Tube  Gram Stain (12-25-22 @ 07:58):    Numerous polymorphonuclear leukocytes per low power field    No Squamous epithelial cells per low power field    Rare Gram Positive Rods seen per oil power field  Final Report (12-26-22 @ 16:49):    Rare Pseudomonas aeruginosa    Normal Respiratory Cathy absent  Organism: Pseudomonas aeruginosa (12-26-22 @ 16:49)  Organism: Pseudomonas aeruginosa (12-26-22 @ 16:49)      -  Amikacin: S <=16      -  Aztreonam: S 8      -  Cefepime: S <=2      -  Ceftazidime: S 4      -  Ciprofloxacin: S <=0.25      -  Gentamicin: S 4      -  Imipenem: S <=1      -  Levofloxacin: S <=0.5      -  Meropenem: S <=1      -  Piperacillin/Tazobactam: S <=8      -  Tobramycin: S <=2      Method Type: BAY    Culture - Blood (collected 12-23-22 @ 14:06)  Source: .Blood Blood-Peripheral  Final Report (12-28-22 @ 19:01):    No Growth Final    Culture - Urine (collected 12-23-22 @ 11:16)  Source: Clean Catch Clean Catch (Midstream)  Final Report (12-26-22 @ 07:02):    >100,000 CFU/ml Klebsiella oxytoca/Raoultella ornithinolytica  Organism: Klebsiella oxytoca /Raoutella ornithinolytica (12-26-22 @ 07:02)  Organism: Klebsiella oxytoca /Raoutella ornithinolytica (12-26-22 @ 07:02)      -  Amikacin: S <=16      -  Amoxicillin/Clavulanic Acid: S <=8/4      -  Ampicillin: R 16 These ampicillin results predict results for amoxicillin      -  Ampicillin/Sulbactam: S <=4/2 Enterobacter, Klebsiella aerogenes, Citrobacter, and Serratia may develop resistance during prolonged therapy (3-4 days)      -  Aztreonam: S <=4      -  Cefazolin: S <=2      -  Cefepime: S <=2      -  Cefoxitin: S <=8      -  Ceftriaxone: S <=1 Enterobacter, Klebsiella aerogenes, Citrobacter, and Serratia may develop resistance during prolonged therapy      -  Ciprofloxacin: S <=0.25      -  Ertapenem: S <=0.5      -  Gentamicin: S <=2      -  Imipenem: S <=1      -  Levofloxacin: S <=0.5      -  Meropenem: S <=1      -  Nitrofurantoin: S <=32 Should not be used to treat pyelonephritis      -  Piperacillin/Tazobactam: S <=8      -  Tobramycin: S <=2      -  Trimethoprim/Sulfamethoxazole: S <=0.5/9.5      Method Type: BAY    Culture - Blood (collected 12-23-22 @ 10:17)  Source: .Blood Blood-Peripheral  Final Report (12-28-22 @ 18:01):    No Growth Final    SARS-CoV-2 Result: NotDetec (23 Dec 2022 10:16)    Radiology: reviewed     Medications:  albuterol    90 MICROgram(s) HFA Inhaler 2 Puff(s) Inhalation every 6 hours  albuterol/ipratropium for Nebulization. 3 milliLiter(s) Nebulizer once  aMIOdarone    Tablet 200 milliGRAM(s) Oral daily  aspirin  chewable 81 milliGRAM(s) Oral daily  chlorhexidine 0.12% Liquid 15 milliLiter(s) Oral Mucosa every 12 hours  dexMEDEtomidine Infusion 0.2 MICROgram(s)/kG/Hr IV Continuous <Continuous>  dextrose 5%. 1000 milliLiter(s) IV Continuous <Continuous>  dextrose 50% Injectable 25 Gram(s) IV Push once  dextrose 50% Injectable 12.5 Gram(s) IV Push once  dextrose 50% Injectable 25 Gram(s) IV Push once  dextrose Oral Gel 15 Gram(s) Oral once PRN  glucagon  Injectable 1 milliGRAM(s) IntraMuscular once  heparin   Injectable 5000 Unit(s) SubCutaneous every 12 hours  insulin lispro (ADMELOG) corrective regimen sliding scale   SubCutaneous every 6 hours  ipratropium 17 MICROgram(s) HFA Inhaler 1 Puff(s) Inhalation every 6 hours  lactulose Syrup 20 Gram(s) Oral two times a day  mupirocin 2% Nasal 1 Application(s) Both Nostrils two times a day  pantoprazole  Injectable 40 milliGRAM(s) IV Push daily  piperacillin/tazobactam IVPB.. 3.375 Gram(s) IV Intermittent every 12 hours  sodium chloride 0.45%. 1000 milliLiter(s) IV Continuous <Continuous>    Current Antimicrobials:  piperacillin/tazobactam IVPB.. 3.375 Gram(s) IV Intermittent every 12 hours    Prior/Completed Antimicrobials:  piperacillin/tazobactam IVPB...  vancomycin  IVPB  vancomycin  IVPB  vancomycin  IVPB.

## 2022-12-31 NOTE — PROGRESS NOTE ADULT - SUBJECTIVE AND OBJECTIVE BOX
CC:  Patient is a 62y old  Male who presents with a chief complaint of resp failure (31 Dec 2022 14:41)      HPI/BRIEF HOSPITAL COURSE:   61 y/o M with a h/o HTN, dilated cardiomyopathy s/p AICD, afib/flutter, CKD (baseline Cr ~1.4), former substance abuse, admitted on 12/23 with acute hypoxic respiratory failure requiring intubation. Post-intubation CXR with left sided PTX and patient subsequently developed PEA cardiac arrest. Emergent chest tube placed by ED physician. Hospital course complicated by aspiration pneumonia, shock, DERRICK on CKD, severe transaminitis, and persistent encephalopathy.     Events last 24 hours:   Mental status improving, able to follow commands. Remains intubated, full support.   PAST MEDICAL & SURGICAL HISTORY:  Heart failure, systolic      CAD (coronary artery disease)      Hypertension      Nonischemic cardiomyopathy      COPD, moderate      2019 novel coronavirus disease (COVID-19)      Substance abuse      HLD (hyperlipidemia)      Cardiac LV ejection fraction 10-20%      AICD (automatic cardioverter/defibrillator) present      Cardiac LV ejection fraction 10-20%        Allergies    No Known Allergies    Intolerances    pork (Other)    FAMILY HISTORY:  FH: lung cancer        Review of Systems:  Negative 2/2 intubation       Medications:  piperacillin/tazobactam IVPB.. 3.375 Gram(s) IV Intermittent every 12 hours    aMIOdarone    Tablet 200 milliGRAM(s) Oral daily    albuterol    90 MICROgram(s) HFA Inhaler 2 Puff(s) Inhalation every 6 hours  albuterol/ipratropium for Nebulization. 3 milliLiter(s) Nebulizer once  ipratropium 17 MICROgram(s) HFA Inhaler 1 Puff(s) Inhalation every 6 hours    dexMEDEtomidine Infusion 0.2 MICROgram(s)/kG/Hr IV Continuous <Continuous>      aspirin  chewable 81 milliGRAM(s) Oral daily  heparin   Injectable 5000 Unit(s) SubCutaneous every 12 hours    lactulose Syrup 20 Gram(s) Oral two times a day  pantoprazole  Injectable 40 milliGRAM(s) IV Push daily      dextrose 50% Injectable 25 Gram(s) IV Push once  dextrose 50% Injectable 12.5 Gram(s) IV Push once  dextrose 50% Injectable 25 Gram(s) IV Push once  dextrose Oral Gel 15 Gram(s) Oral once PRN  glucagon  Injectable 1 milliGRAM(s) IntraMuscular once  insulin lispro (ADMELOG) corrective regimen sliding scale   SubCutaneous every 6 hours    dextrose 5%. 1000 milliLiter(s) IV Continuous <Continuous>  sodium chloride 0.45%. 1000 milliLiter(s) IV Continuous <Continuous>      chlorhexidine 0.12% Liquid 15 milliLiter(s) Oral Mucosa every 12 hours  mupirocin 2% Nasal 1 Application(s) Both Nostrils two times a day        Mode: AC/ CMV (Assist Control/ Continuous Mandatory Ventilation)  RR (machine): 20  TV (machine): 400  FiO2: 30  PEEP: 5  ITime: 1  MAP: 10  PIP: 21      ICU Vital Signs Last 24 Hrs  T(C): 36.7 (31 Dec 2022 16:00), Max: 36.7 (31 Dec 2022 16:00)  T(F): 98.1 (31 Dec 2022 16:00), Max: 98.1 (31 Dec 2022 16:00)  HR: 67 (31 Dec 2022 18:00) (67 - 90)  BP: 98/61 (31 Dec 2022 18:00) (83/60 - 136/88)  BP(mean): 74 (31 Dec 2022 18:00) (69 - 102)  ABP: --  ABP(mean): --  RR: 23 (31 Dec 2022 18:00) (13 - 25)  SpO2: 100% (31 Dec 2022 18:00) (90% - 100%)    O2 Parameters below as of 31 Dec 2022 14:00  Patient On (Oxygen Delivery Method): ventilator,CPAP mode          Vital Signs Last 24 Hrs  T(C): 36.7 (31 Dec 2022 16:00), Max: 36.7 (31 Dec 2022 16:00)  T(F): 98.1 (31 Dec 2022 16:00), Max: 98.1 (31 Dec 2022 16:00)  HR: 67 (31 Dec 2022 18:00) (67 - 90)  BP: 98/61 (31 Dec 2022 18:00) (83/60 - 136/88)  BP(mean): 74 (31 Dec 2022 18:00) (69 - 102)  RR: 23 (31 Dec 2022 18:00) (13 - 25)  SpO2: 100% (31 Dec 2022 18:00) (90% - 100%)    Parameters below as of 31 Dec 2022 14:00  Patient On (Oxygen Delivery Method): ventilator,CPAP mode            I&O's Detail    30 Dec 2022 07:01  -  31 Dec 2022 07:00  --------------------------------------------------------  IN:    Dexmedetomidine: 29.4 mL    Enteral Tube Flush: 750 mL    IV PiggyBack: 200 mL    Nepro with Carb Steady: 960 mL    sodium chloride 0.45%: 1800 mL  Total IN: 3739.4 mL    OUT:    Chest Tube (mL): 0 mL    Indwelling Catheter - Urethral (mL): 1030 mL  Total OUT: 1030 mL    Total NET: 2709.4 mL      31 Dec 2022 07:01  -  31 Dec 2022 19:35  --------------------------------------------------------  IN:    Dexmedetomidine: 26 mL    Enteral Tube Flush: 500 mL    IV PiggyBack: 100 mL    Nepro with Carb Steady: 440 mL    sodium chloride 0.45%: 825 mL  Total IN: 1891 mL    OUT:    Indwelling Catheter - Urethral (mL): 315 mL  Total OUT: 315 mL    Total NET: 1576 mL            LABS:                        13.2   11.25 )-----------( 102      ( 31 Dec 2022 06:32 )             40.4     12-31    138  |  104  |  115<H>  ----------------------------<  165<H>  3.5   |  26  |  3.88<H>    Ca    8.4      31 Dec 2022 06:32    TPro  5.3<L>  /  Alb  1.9<L>  /  TBili  1.1  /  DBili  x   /  AST  55<H>  /  ALT  236<H>  /  AlkPhos  250<H>  12-31          CAPILLARY BLOOD GLUCOSE      POCT Blood Glucose.: 174 mg/dL (31 Dec 2022 17:00)        CULTURES:    piperacillin/tazobactam IVPB.. 3.375 Gram(s) IV Intermittent every 12 hours      Physical Examination:  General: Intubated  NEURO: Mental status improving, following commands today. CN2-12 intact.   HEENT: Pupils equal, reactive to light.  Symmetric.  PULM: CTA BL, no significant sputum production, no wheezes, rales, rhonchi  CVS: Regular rate and rhythm, no murmurs, rubs, or gallops  ABD: Soft, nondistended, nontender, normoactive bowel sounds, no masses  EXT: No edema, nontender  SKIN: Warm and well perfused, no rashes noted      RADIOLOGY:   < from: CT Chest No Cont (12.29.22 @ 13:20) >  IMPRESSION:.    Unchanged small loculated left pneumothorax.    Unchanged small loculated and chronic right pleural effusion.    Decreased subcutaneous soft tissue emphysema and extraluminal abdominal   gas.    --- End of Report ---    < end of copied text >    < from: CT Head No Cont (12.29.22 @ 13:20) >    IMPRESSION: No acute intracranial hemorrhage, mass effect, or shift of   the midline structures.    Similar-appearing mild chronic white matter microvascular type changes.    --- End of Report ---      < end of copied text >

## 2022-12-31 NOTE — PROGRESS NOTE ADULT - ASSESSMENT
62M CAD, Dilated cardiomyopathy, S/p AICD, Afib/flutter, h/o substance abuse, CKD baseline creat approx 1.4 last admitted to [facility] with MSSA bacteremia, and CHF.  Sent from [facility] with acute hypoxic respiratory failure/PEA arrest.  Initial CT Head - No acute pathology.  On C pap  Improvement is seen. Pt seem to make eye contact and is able to visually track at times.   Repeat CT head 12/29 - : No acute intracranial hemorrhage, mass effect, or shift of the midline structures. Similar-appearing mild chronic white matter microvascular type changes.  Able to move left hand and foot some what. No movements seen on right hand or foot. Right hand is swollen. CT Headx2 - Are neg for any acute pathology -  If weakness persists, would get MRI B rain when feasible.  Would follow.

## 2022-12-31 NOTE — PROGRESS NOTE ADULT - ASSESSMENT
The patient is a 62 year old male with a history of CAD, chronic systolic heart failure s/p ICD, atrial flutter s/p DCCV, substance abuse, CKD who is admitted with respiratory failure in the setting of tension pneumothorax complicated by cardiac arrest.    Plan:  - ECG with sinus tachycardia and known LBBB  - Echo 2/22 with severely reduced LV (EF 10%) and RV function, mod MR, mod TR, mild pulm HTN  - When extubated and BP trends up, will attempt to resume metoprolol succinate. Not a candidate for ACEI/ARB/ARNI at the current time due to renal function.  - Poor renal function - hold apixaban  - Hold bumetanide and spironolactone due to DERRICK  - Continue amiodarone 200 mg daily  - Continue aspirin 81 mg daily  - IV antibiotics  - Mechanical ventilation  - ICU care  - Overall poor prognosis

## 2022-12-31 NOTE — PROGRESS NOTE ADULT - SUBJECTIVE AND OBJECTIVE BOX
DOROTHY VOSS    [facility]    Allergies    No Known Allergies    Intolerances    pork (Other)      PAST MEDICAL & SURGICAL HISTORY:  Heart failure, systolic      CAD (coronary artery disease)      Hypertension      Nonischemic cardiomyopathy      COPD, moderate      2019 novel coronavirus disease (COVID-19)      Substance abuse      HLD (hyperlipidemia)      Cardiac LV ejection fraction 10-20%      AICD (automatic cardioverter/defibrillator) present      Cardiac LV ejection fraction 10-20%          FAMILY HISTORY:  FH: lung cancer        Home Medications:  acetaminophen 325 mg oral tablet: 2 tab(s) orally every 6 hours, As needed, Temp greater or equal to 38C (100.4F), Mild Pain (1 - 3) (23 Dec 2022 10:07)  apixaban 5 mg oral tablet: 1 tab(s) orally every 12 hours (23 Dec 2022 10:07)  Aquaphor Healing topical ointment: Apply topically to affected area once a day (23 Dec 2022 10:07)  ascorbic acid 500 mg oral tablet: 1 tab(s) orally once a day (23 Dec 2022 10:07)  Bacid (LAC) oral capsule: 2 cap(s) orally once a day (23 Dec 2022 10:07)  bumetanide 2 mg oral tablet: 1 tab(s) orally 2 times a day (23 Dec 2022 10:07)  gabapentin 100 mg oral capsule: 1 cap(s) orally 3 times a day (23 Dec 2022 10:07)  melatonin 3 mg oral tablet: 1 tab(s) orally once a day (at bedtime), As needed, Insomnia (23 Dec 2022 10:07)  Multiple Vitamins with Minerals oral tablet: 1 tab(s) orally once a day (23 Dec 2022 10:07)  pantoprazole 40 mg oral delayed release tablet: 1 tab(s) orally once a day (before a meal) (23 Dec 2022 10:07)  sacubitril-valsartan 24 mg-26 mg oral tablet: 1 tab(s) orally 2 times a day (23 Dec 2022 10:07)  simethicone 80 mg oral tablet: 2 tab(s) orally every 6 hours, As Needed (23 Dec 2022 10:07)  spironolactone 25 mg oral tablet: 1 tab(s) orally once a day (23 Dec 2022 10:07)  Symbicort 160 mcg-4.5 mcg/inh inhalation aerosol: 2 puff(s) inhaled 2 times a day (23 Dec 2022 10:07)  Ventolin HFA 90 mcg/inh inhalation aerosol: 2 puff(s) inhaled every 6 hours, As Needed (23 Dec 2022 10:07)      MEDICATIONS  (STANDING):  albuterol    90 MICROgram(s) HFA Inhaler 2 Puff(s) Inhalation every 6 hours  albuterol/ipratropium for Nebulization. 3 milliLiter(s) Nebulizer once  aMIOdarone    Tablet 200 milliGRAM(s) Oral daily  aspirin  chewable 81 milliGRAM(s) Oral daily  chlorhexidine 0.12% Liquid 15 milliLiter(s) Oral Mucosa every 12 hours  dexMEDEtomidine Infusion 0.2 MICROgram(s)/kG/Hr (4.08 mL/Hr) IV Continuous <Continuous>  dextrose 5%. 1000 milliLiter(s) (100 mL/Hr) IV Continuous <Continuous>  dextrose 50% Injectable 25 Gram(s) IV Push once  dextrose 50% Injectable 12.5 Gram(s) IV Push once  dextrose 50% Injectable 25 Gram(s) IV Push once  glucagon  Injectable 1 milliGRAM(s) IntraMuscular once  heparin   Injectable 5000 Unit(s) SubCutaneous every 12 hours  insulin lispro (ADMELOG) corrective regimen sliding scale   SubCutaneous every 6 hours  ipratropium 17 MICROgram(s) HFA Inhaler 1 Puff(s) Inhalation every 6 hours  lactulose Syrup 20 Gram(s) Oral two times a day  mupirocin 2% Nasal 1 Application(s) Both Nostrils two times a day  pantoprazole  Injectable 40 milliGRAM(s) IV Push daily  piperacillin/tazobactam IVPB.. 3.375 Gram(s) IV Intermittent every 12 hours  sodium chloride 0.45%. 1000 milliLiter(s) (75 mL/Hr) IV Continuous <Continuous>    MEDICATIONS  (PRN):  dextrose Oral Gel 15 Gram(s) Oral once PRN Blood Glucose LESS THAN 70 milliGRAM(s)/deciliter      Diet, NPO with Tube Feed:   Tube Feeding Modality: Nasogastric  Nepro with Carb Steady  Total Volume for 24 Hours (mL): 960  Continuous  Starting Tube Feed Rate mL per Hour: 20  Increase Tube Feed Rate by (mL): 10     Every 4 hours  Until Goal Tube Feed Rate (mL per Hour): 40  Tube Feed Duration (in Hours): 24  Tube Feed Start Time: 13:00  Free Water Flush  Pump   Rate (mL per Hour): 30 (12-26-22 @ 23:12) [Active]          Vital Signs Last 24 Hrs  T(C): 36.3 (31 Dec 2022 03:00), Max: 36.5 (30 Dec 2022 12:27)  T(F): 97.4 (31 Dec 2022 03:00), Max: 97.7 (30 Dec 2022 12:27)  HR: 72 (31 Dec 2022 06:00) (67 - 92)  BP: 97/69 (31 Dec 2022 06:00) (83/60 - 119/104)  BP(mean): 78 (31 Dec 2022 06:00) (69 - 111)  RR: 15 (31 Dec 2022 06:00) (13 - 23)  SpO2: 95% (31 Dec 2022 06:00) (90% - 100%)    Parameters below as of 31 Dec 2022 01:31  Patient On (Oxygen Delivery Method): ventilator,21%          12-30-22 @ 07:01  -  12-31-22 @ 07:00  --------------------------------------------------------  IN: 3739.4 mL / OUT: 1030 mL / NET: 2709.4 mL        Mode: AC/ CMV (Assist Control/ Continuous Mandatory Ventilation), RR (machine): 20, TV (machine): 400, FiO2: 30, PEEP: 5, ITime: 1, MAP: 11, PIP: 28      LABS:                        13.2   11.25 )-----------( 102      ( 31 Dec 2022 06:32 )             40.4     12-31    138  |  104  |  115<H>  ----------------------------<  165<H>  3.5   |  26  |  3.88<H>    Ca    8.4      31 Dec 2022 06:32    TPro  5.3<L>  /  Alb  1.9<L>  /  TBili  1.1  /  DBili  x   /  AST  55<H>  /  ALT  236<H>  /  AlkPhos  250<H>  12-31              WBC:  WBC Count: 11.25 K/uL (12-31 @ 06:32)  WBC Count: 13.69 K/uL (12-30 @ 16:30)  WBC Count: 11.96 K/uL (12-29 @ 12:29)  WBC Count: 12.51 K/uL (12-28 @ 06:40)      MICROBIOLOGY:  RECENT CULTURES:  12-24 ET Tube ET Tube Pseudomonas aeruginosa   Numerous polymorphonuclear leukocytes per low power field  No Squamous epithelial cells per low power field  Rare Gram Positive Rods seen per oil power field   Rare Pseudomonas aeruginosa  Normal Respiratory Cathy absent                    Sodium:  Sodium, Serum: 138 mmol/L (12-31 @ 06:32)  Sodium, Serum: 142 mmol/L (12-30 @ 16:30)  Sodium, Serum: 144 mmol/L (12-29 @ 12:29)  Sodium, Serum: 144 mmol/L (12-28 @ 06:40)      3.88 mg/dL 12-31 @ 06:32  3.85 mg/dL 12-30 @ 16:30  3.93 mg/dL 12-29 @ 12:29  4.04 mg/dL 12-28 @ 06:40      Hemoglobin:  Hemoglobin: 13.2 g/dL (12-31 @ 06:32)  Hemoglobin: 13.8 g/dL (12-30 @ 16:30)  Hemoglobin: 15.5 g/dL (12-29 @ 12:29)  Hemoglobin: 15.6 g/dL (12-28 @ 06:40)      Platelets: Platelet Count - Automated: 102 K/uL (12-31 @ 06:32)  Platelet Count - Automated: 100 K/uL (12-30 @ 16:30)  Platelet Count - Automated: 116 K/uL (12-29 @ 12:29)  Platelet Count - Automated: 136 K/uL (12-28 @ 06:40)      LIVER FUNCTIONS - ( 31 Dec 2022 06:32 )  Alb: 1.9 g/dL / Pro: 5.3 g/dL / ALK PHOS: 250 U/L / ALT: 236 U/L DA / AST: 55 U/L / GGT: x                 RADIOLOGY & ADDITIONAL STUDIES:      MICROBIOLOGY:  RECENT CULTURES:  12-24 ET Tube ET Tube Pseudomonas aeruginosa   Numerous polymorphonuclear leukocytes per low power field  No Squamous epithelial cells per low power field  Rare Gram Positive Rods seen per oil power field   Rare Pseudomonas aeruginosa  Normal Respiratory Cathy absent

## 2022-12-31 NOTE — PROGRESS NOTE ADULT - SUBJECTIVE AND OBJECTIVE BOX
Patient is a 62y old  Male who presents with a chief complaint of resp failure (26 Dec 2022 09:47)    HPI: 62M CAD, Dilated cardiomyopathy, S/p AICD, Afib/flutter, h/o substance abuse, CKD baseline creat approx 1.4 last admitted to Health system with MSSA bacteremia, and CHF.  Sent from [facility] with acute hypoxic respiratory failure.  Was initially on BiPAP then became hypoxic.  Anesthesia intubated patient.  On post intubation Xray pneumothorax evident.  Patient with PEA arrest.  Chest tube placed by ED physician.  ROSC obtained.     Patient unable to give further history.      Intetrval history -     Intubated on vent.   On C pap   Opens eyes.  Makes eye contract.    MEDICATIONS  (STANDING):    albuterol    90 MICROgram(s) HFA Inhaler 2 Puff(s) Inhalation every 6 hours  albuterol/ipratropium for Nebulization. 3 milliLiter(s) Nebulizer once  aMIOdarone    Tablet 200 milliGRAM(s) Oral daily  aspirin  chewable 81 milliGRAM(s) Oral daily  chlorhexidine 0.12% Liquid 15 milliLiter(s) Oral Mucosa every 12 hours  dexMEDEtomidine Infusion 0.2 MICROgram(s)/kG/Hr (4.08 mL/Hr) IV Continuous <Continuous>  dextrose 5%. 1000 milliLiter(s) (100 mL/Hr) IV Continuous <Continuous>  dextrose 50% Injectable 25 Gram(s) IV Push once  dextrose 50% Injectable 12.5 Gram(s) IV Push once  dextrose 50% Injectable 25 Gram(s) IV Push once  glucagon  Injectable 1 milliGRAM(s) IntraMuscular once  heparin   Injectable 5000 Unit(s) SubCutaneous every 12 hours  insulin lispro (ADMELOG) corrective regimen sliding scale   SubCutaneous every 6 hours  ipratropium 17 MICROgram(s) HFA Inhaler 1 Puff(s) Inhalation every 6 hours  lactulose Syrup 20 Gram(s) Oral two times a day  mupirocin 2% Nasal 1 Application(s) Both Nostrils two times a day  pantoprazole  Injectable 40 milliGRAM(s) IV Push daily  piperacillin/tazobactam IVPB.. 3.375 Gram(s) IV Intermittent every 12 hours  sodium chloride 0.45%. 1000 milliLiter(s) (75 mL/Hr) IV Continuous <Continuous>    MEDICATIONS  (PRN):    dextrose Oral Gel 15 Gram(s) Oral once PRN Blood Glucose LESS THAN 70 milliGRAM(s)/deciliter    Allergies    No Known Allergies    Intolerances    pork (Other)    REVIEW OF SYSTEMS: UTO    PHYSICAL EXAM:    Vital Signs Last 24 Hrs    T(C): 36.4 (31 Dec 2022 08:00), Max: 36.5 (30 Dec 2022 12:27)  T(F): 97.5 (31 Dec 2022 08:00), Max: 97.7 (30 Dec 2022 12:27)  HR: 74 (31 Dec 2022 11:00) (67 - 92)  BP: 85/64 (31 Dec 2022 11:00) (83/60 - 119/104)  BP(mean): 71 (31 Dec 2022 11:00) (69 - 111)  RR: 16 (31 Dec 2022 11:00) (13 - 23)  SpO2: 96% (31 Dec 2022 11:00) (90% - 100%)    Parameters below as of 31 Dec 2022 01:31  Patient On (Oxygen Delivery Method): ventilator,21%    Parameters below as of 30 Dec 2022 10:00  Patient On (Oxygen Delivery Method): ventilator,CPAP mode    On Neurological Examination:    Intubated on vent.  Not on any sedation.  On C pap   Pt seem to make eye contact at times.  Pupils are 3 mm, sluggishly reacting to light.  Neck is supple.  Able to move left hand and foot some what. No movements seen on right hand or foot. Right hand is swollen.   No abn body movements.    LABS:    CBC Full  -  ( 31 Dec 2022 06:32 )  WBC Count : 11.25 K/uL  RBC Count : 4.32 M/uL  Hemoglobin : 13.2 g/dL  Hematocrit : 40.4 %  Platelet Count - Automated : 102 K/uL  Mean Cell Volume : 93.5 fl  Mean Cell Hemoglobin : 30.6 pg  Mean Cell Hemoglobin Concentration : 32.7 gm/dL  Auto Neutrophil # : 9.05 K/uL  Auto Lymphocyte # : 0.61 K/uL  Auto Monocyte # : 0.87 K/uL  Auto Eosinophil # : 0.31 K/uL  Auto Basophil # : 0.04 K/uL  Auto Neutrophil % : 80.4 %  Auto Lymphocyte % : 5.4 %  Auto Monocyte % : 7.7 %  Auto Eosinophil % : 2.8 %  Auto Basophil % : 0.4 %    12-31    138  |  104  |  115<H>  ----------------------------<  165<H>  3.5   |  26  |  3.88<H>    Ca    8.4      31 Dec 2022 06:32    TPro  5.3<L>  /  Alb  1.9<L>  /  TBili  1.1  /  DBili  x   /  AST  55<H>  /  ALT  236<H>  /  AlkPhos  250<H>  12-31    RADIOLOGY & ADDITIONAL STUDIES:    r< from: CT Head No Cont (12.29.22 @ 13:20) >    IMPRESSION: No acute intracranial hemorrhage, mass effect, or shift of   the midline structures.    Similar-appearing mild chronic white matter microvascular type changes.    < end of copied text >      < from: CT Head No Cont (12.23.22 @ 21:18) >    IMPRESSION:    No large territory acute infarct, intracranial hemorrhage, or mass effect.    Slight progression of chronic microangiopathic white matter changes as compared to prior study from 2016.    < end of copied text >    < from: CT Chest No Cont (12.23.22 @ 21:19) >    IMPRESSION:    *  Left chest tube with residual small left pneumothorax. No evidence of tension.  *  Right basilar rounded atelectasis again noted with chronic small loculated right pleural effusion.  *  Small pneumomediastinum. Left chest and abdominal wall emphysema. Moderate pneumoretroperitoneum and properitoneal air. No pneumoperitoneum. Findings are compatible with barotrauma.  *  Evidence of urinary bladder cystitis.    < end of copied text >

## 2022-12-31 NOTE — PROGRESS NOTE ADULT - ASSESSMENT
Pt is a 62M CAD, Dilated cardiomyopathy, S/p AICD, Afib/flutter, h/o substance abuse, CKD baseline creat approx 1.4 last admitted to [facility] with MSSA bacteremia, and CHF.    Sent from [facility] with acute hypoxic respiratory failure. Was initially on BiPAP then became hypoxic.  Anesthesia intubated patient.  On post intubation Xray pneumothorax evident.  Patient with PEA arrest.  Chest tube placed by ED physician.  ROSC obtained.       S/p PEA arrest  AHRF requiring intubation  Aspiration PNA   Loculated pleural effusions  - imaging reviewed  - BCx negative   - Sputum Cx -- Pseudomonas  - UCx -- Klebsiella  - susceptibilities reviewed  - WBC stable, afebrile  - c/w zosyn, plan x10 day course until 1/1/2023  - trend temps/WBC--elevated but stable  - maintain aspiration precautions  - ICU care    GOC per primary team    Indu Rivas M.D.  [facility], Division of Infectious Diseases  [phone]  After 5pm on weekdays and all day on weekends - please call [phone]

## 2022-12-31 NOTE — PROGRESS NOTE ADULT - SUBJECTIVE AND OBJECTIVE BOX
Patient is a 62y old  Male who presents with a chief complaint of resp failure (31 Dec 2022 13:39)      SUBJECTIVE / OVERNIGHT EVENTS: pt seen and examined. no fever, no shob, NAD, no c/o pain        Vital Signs Last 24 Hrs  T(C): 36.4 (31 Dec 2022 08:00), Max: 36.4 (30 Dec 2022 19:00)  T(F): 97.5 (31 Dec 2022 08:00), Max: 97.6 (30 Dec 2022 19:00)  HR: 85 (31 Dec 2022 12:52) (67 - 92)  BP: 85/64 (31 Dec 2022 11:00) (83/60 - 119/104)  BP(mean): 71 (31 Dec 2022 11:00) (69 - 111)  RR: 16 (31 Dec 2022 11:00) (13 - 23)  SpO2: 98% (31 Dec 2022 12:52) (90% - 100%)    Parameters below as of 31 Dec 2022 01:31  Patient On (Oxygen Delivery Method): ventilator,21%      I&O's Summary    30 Dec 2022 07:01  -  31 Dec 2022 07:00  --------------------------------------------------------  IN: 3739.4 mL / OUT: 1030 mL / NET: 2709.4 mL        PHYSICAL EXAM:  GENERAL: NAD  HEAD:  Atraumatic, Normocephalic  NECK: Supple  CHEST/LUNG: dec bs at bases  HEART: S1+S2  ABDOMEN: Soft, Nontender, Nondistended; Bowel sounds present  Neuro: naaox 0    LABS:                        13.2   11.25 )-----------( 102      ( 31 Dec 2022 06:32 )             40.4     12-31    138  |  104  |  115<H>  ----------------------------<  165<H>  3.5   |  26  |  3.88<H>    Ca    8.4      31 Dec 2022 06:32    TPro  5.3<L>  /  Alb  1.9<L>  /  TBili  1.1  /  DBili  x   /  AST  55<H>  /  ALT  236<H>  /  AlkPhos  250<H>  12-31      CAPILLARY BLOOD GLUCOSE      POCT Blood Glucose.: 145 mg/dL (31 Dec 2022 13:25)  POCT Blood Glucose.: 175 mg/dL (31 Dec 2022 06:35)  POCT Blood Glucose.: 121 mg/dL (31 Dec 2022 00:17)  POCT Blood Glucose.: 126 mg/dL (30 Dec 2022 17:26)            RADIOLOGY & ADDITIONAL TESTS:    Imaging Personally Reviewed:  [x] YES  [ ] NO    Consultant(s) Notes Reviewed:  [x] YES  [ ] NO      MEDICATIONS  (STANDING):  albuterol    90 MICROgram(s) HFA Inhaler 2 Puff(s) Inhalation every 6 hours  albuterol/ipratropium for Nebulization. 3 milliLiter(s) Nebulizer once  aMIOdarone    Tablet 200 milliGRAM(s) Oral daily  aspirin  chewable 81 milliGRAM(s) Oral daily  chlorhexidine 0.12% Liquid 15 milliLiter(s) Oral Mucosa every 12 hours  dexMEDEtomidine Infusion 0.2 MICROgram(s)/kG/Hr (4.08 mL/Hr) IV Continuous <Continuous>  dextrose 5%. 1000 milliLiter(s) (100 mL/Hr) IV Continuous <Continuous>  dextrose 50% Injectable 25 Gram(s) IV Push once  dextrose 50% Injectable 12.5 Gram(s) IV Push once  dextrose 50% Injectable 25 Gram(s) IV Push once  glucagon  Injectable 1 milliGRAM(s) IntraMuscular once  heparin   Injectable 5000 Unit(s) SubCutaneous every 12 hours  insulin lispro (ADMELOG) corrective regimen sliding scale   SubCutaneous every 6 hours  ipratropium 17 MICROgram(s) HFA Inhaler 1 Puff(s) Inhalation every 6 hours  lactulose Syrup 20 Gram(s) Oral two times a day  mupirocin 2% Nasal 1 Application(s) Both Nostrils two times a day  pantoprazole  Injectable 40 milliGRAM(s) IV Push daily  piperacillin/tazobactam IVPB.. 3.375 Gram(s) IV Intermittent every 12 hours  sodium chloride 0.45%. 1000 milliLiter(s) (75 mL/Hr) IV Continuous <Continuous>    MEDICATIONS  (PRN):  dextrose Oral Gel 15 Gram(s) Oral once PRN Blood Glucose LESS THAN 70 milliGRAM(s)/deciliter      Care Discussed with Consultants/Other Providers [x] YES  [ ] NO    HEALTH ISSUES - PROBLEM Dx:

## 2022-12-31 NOTE — PROGRESS NOTE ADULT - SUBJECTIVE AND OBJECTIVE BOX
DOROTHY VOSS    [facility]    Allergies    No Known Allergies    Intolerances    pork (Other)      PAST MEDICAL & SURGICAL HISTORY:  Heart failure, systolic      CAD (coronary artery disease)      Hypertension      Nonischemic cardiomyopathy      COPD, moderate      2019 novel coronavirus disease (COVID-19)      Substance abuse      HLD (hyperlipidemia)      Cardiac LV ejection fraction 10-20%      AICD (automatic cardioverter/defibrillator) present      Cardiac LV ejection fraction 10-20%          FAMILY HISTORY:  FH: lung cancer        Home Medications:  acetaminophen 325 mg oral tablet: 2 tab(s) orally every 6 hours, As needed, Temp greater or equal to 38C (100.4F), Mild Pain (1 - 3) (23 Dec 2022 10:07)  apixaban 5 mg oral tablet: 1 tab(s) orally every 12 hours (23 Dec 2022 10:07)  Aquaphor Healing topical ointment: Apply topically to affected area once a day (23 Dec 2022 10:07)  ascorbic acid 500 mg oral tablet: 1 tab(s) orally once a day (23 Dec 2022 10:07)  Bacid (LAC) oral capsule: 2 cap(s) orally once a day (23 Dec 2022 10:07)  bumetanide 2 mg oral tablet: 1 tab(s) orally 2 times a day (23 Dec 2022 10:07)  gabapentin 100 mg oral capsule: 1 cap(s) orally 3 times a day (23 Dec 2022 10:07)  melatonin 3 mg oral tablet: 1 tab(s) orally once a day (at bedtime), As needed, Insomnia (23 Dec 2022 10:07)  Multiple Vitamins with Minerals oral tablet: 1 tab(s) orally once a day (23 Dec 2022 10:07)  pantoprazole 40 mg oral delayed release tablet: 1 tab(s) orally once a day (before a meal) (23 Dec 2022 10:07)  sacubitril-valsartan 24 mg-26 mg oral tablet: 1 tab(s) orally 2 times a day (23 Dec 2022 10:07)  simethicone 80 mg oral tablet: 2 tab(s) orally every 6 hours, As Needed (23 Dec 2022 10:07)  spironolactone 25 mg oral tablet: 1 tab(s) orally once a day (23 Dec 2022 10:07)  Symbicort 160 mcg-4.5 mcg/inh inhalation aerosol: 2 puff(s) inhaled 2 times a day (23 Dec 2022 10:07)  Ventolin HFA 90 mcg/inh inhalation aerosol: 2 puff(s) inhaled every 6 hours, As Needed (23 Dec 2022 10:07)      MEDICATIONS  (STANDING):  albuterol    90 MICROgram(s) HFA Inhaler 2 Puff(s) Inhalation every 6 hours  albuterol/ipratropium for Nebulization. 3 milliLiter(s) Nebulizer once  aMIOdarone    Tablet 200 milliGRAM(s) Oral daily  aspirin  chewable 81 milliGRAM(s) Oral daily  chlorhexidine 0.12% Liquid 15 milliLiter(s) Oral Mucosa every 12 hours  dexMEDEtomidine Infusion 0.2 MICROgram(s)/kG/Hr (4.08 mL/Hr) IV Continuous <Continuous>  dextrose 5%. 1000 milliLiter(s) (100 mL/Hr) IV Continuous <Continuous>  dextrose 50% Injectable 25 Gram(s) IV Push once  dextrose 50% Injectable 12.5 Gram(s) IV Push once  dextrose 50% Injectable 25 Gram(s) IV Push once  glucagon  Injectable 1 milliGRAM(s) IntraMuscular once  heparin   Injectable 5000 Unit(s) SubCutaneous every 12 hours  insulin lispro (ADMELOG) corrective regimen sliding scale   SubCutaneous every 6 hours  ipratropium 17 MICROgram(s) HFA Inhaler 1 Puff(s) Inhalation every 6 hours  lactulose Syrup 20 Gram(s) Oral two times a day  mupirocin 2% Nasal 1 Application(s) Both Nostrils two times a day  pantoprazole  Injectable 40 milliGRAM(s) IV Push daily  piperacillin/tazobactam IVPB.. 3.375 Gram(s) IV Intermittent every 12 hours  sodium chloride 0.45%. 1000 milliLiter(s) (75 mL/Hr) IV Continuous <Continuous>    MEDICATIONS  (PRN):  dextrose Oral Gel 15 Gram(s) Oral once PRN Blood Glucose LESS THAN 70 milliGRAM(s)/deciliter      Diet, NPO with Tube Feed:   Tube Feeding Modality: Nasogastric  Nepro with Carb Steady  Total Volume for 24 Hours (mL): 960  Continuous  Starting Tube Feed Rate mL per Hour: 20  Increase Tube Feed Rate by (mL): 10     Every 4 hours  Until Goal Tube Feed Rate (mL per Hour): 40  Tube Feed Duration (in Hours): 24  Tube Feed Start Time: 13:00  Free Water Flush  Pump   Rate (mL per Hour): 30 (12-26-22 @ 23:12) [Active]          Vital Signs Last 24 Hrs  T(C): 36.3 (31 Dec 2022 03:00), Max: 36.5 (30 Dec 2022 12:27)  T(F): 97.4 (31 Dec 2022 03:00), Max: 97.7 (30 Dec 2022 12:27)  HR: 72 (31 Dec 2022 06:00) (67 - 92)  BP: 97/69 (31 Dec 2022 06:00) (83/60 - 119/104)  BP(mean): 78 (31 Dec 2022 06:00) (69 - 111)  RR: 15 (31 Dec 2022 06:00) (13 - 23)  SpO2: 95% (31 Dec 2022 06:00) (90% - 100%)    Parameters below as of 31 Dec 2022 01:31  Patient On (Oxygen Delivery Method): ventilator,21%          12-30-22 @ 07:01  -  12-31-22 @ 07:00  --------------------------------------------------------  IN: 3739.4 mL / OUT: 1030 mL / NET: 2709.4 mL        Mode: AC/ CMV (Assist Control/ Continuous Mandatory Ventilation), RR (machine): 20, TV (machine): 400, FiO2: 30, PEEP: 5, ITime: 1, MAP: 11, PIP: 28      LABS:                        13.2   11.25 )-----------( 102      ( 31 Dec 2022 06:32 )             40.4     12-31    138  |  104  |  115<H>  ----------------------------<  165<H>  3.5   |  26  |  3.88<H>    Ca    8.4      31 Dec 2022 06:32    TPro  5.3<L>  /  Alb  1.9<L>  /  TBili  1.1  /  DBili  x   /  AST  55<H>  /  ALT  236<H>  /  AlkPhos  250<H>  12-31              WBC:  WBC Count: 11.25 K/uL (12-31 @ 06:32)  WBC Count: 13.69 K/uL (12-30 @ 16:30)  WBC Count: 11.96 K/uL (12-29 @ 12:29)  WBC Count: 12.51 K/uL (12-28 @ 06:40)      MICROBIOLOGY:  RECENT CULTURES:  12-24 ET Tube ET Tube Pseudomonas aeruginosa   Numerous polymorphonuclear leukocytes per low power field  No Squamous epithelial cells per low power field  Rare Gram Positive Rods seen per oil power field   Rare Pseudomonas aeruginosa  Normal Respiratory Cathy absent                    Sodium:  Sodium, Serum: 138 mmol/L (12-31 @ 06:32)  Sodium, Serum: 142 mmol/L (12-30 @ 16:30)  Sodium, Serum: 144 mmol/L (12-29 @ 12:29)  Sodium, Serum: 144 mmol/L (12-28 @ 06:40)      3.88 mg/dL 12-31 @ 06:32  3.85 mg/dL 12-30 @ 16:30  3.93 mg/dL 12-29 @ 12:29  4.04 mg/dL 12-28 @ 06:40      Hemoglobin:  Hemoglobin: 13.2 g/dL (12-31 @ 06:32)  Hemoglobin: 13.8 g/dL (12-30 @ 16:30)  Hemoglobin: 15.5 g/dL (12-29 @ 12:29)  Hemoglobin: 15.6 g/dL (12-28 @ 06:40)      Platelets: Platelet Count - Automated: 102 K/uL (12-31 @ 06:32)  Platelet Count - Automated: 100 K/uL (12-30 @ 16:30)  Platelet Count - Automated: 116 K/uL (12-29 @ 12:29)  Platelet Count - Automated: 136 K/uL (12-28 @ 06:40)      LIVER FUNCTIONS - ( 31 Dec 2022 06:32 )  Alb: 1.9 g/dL / Pro: 5.3 g/dL / ALK PHOS: 250 U/L / ALT: 236 U/L DA / AST: 55 U/L / GGT: x                 RADIOLOGY & ADDITIONAL STUDIES:      MICROBIOLOGY:  RECENT CULTURES:  12-24 ET Tube ET Tube Pseudomonas aeruginosa   Numerous polymorphonuclear leukocytes per low power field  No Squamous epithelial cells per low power field  Rare Gram Positive Rods seen per oil power field   Rare Pseudomonas aeruginosa  Normal Respiratory Cathy absent

## 2022-12-31 NOTE — PROGRESS NOTE ADULT - ASSESSMENT
REVIEW OF SYMPTOMS      Able to give (reliable) ROS  NO     PHYSICAL EXAM    HEENT Unremarkable  atraumatic   RESP Fair air entry EXP prolonged    Harsh breath sound Resp distres mild   CARDIAC S1 S2 No S3     NO JVD    ABDOMEN SOFT BS PRESENT NOT DISTENDED No hepatosplenomegaly   PEDAL EDEMA present No calf tenderness  NO rash       GENERAL DATA .   GOC.   12/23/2022 full code  ALLGY.     nka                  WT.     12/24/2022 81           BMI.       12/24/2022 26          ICU STAY. .. 12/23/2022  COVID. ..  12/23/2022 scv2 (-)   BEST PRACTICE ISSUES.    HOB ELEVATN. Yes  DVT PPLX. ..    12/23/2022 hpsc   WHITMORE PPLX. ..    12/23/2022 protonix 40   INFN PPLX. ..  12/23/2022 chlorhexidine .12%   SP SW VIVIAN.         DIET.  .. 12/26 nepro 960 ng    IV fl... 12/28; 1/2 ns 75   PROCEDURES...   12/23/2022  l chest tube   12/23/2022 intubated   12/23/2022 reed     PAST PROCEDURES.  .  12/25/2022 glucerna 960 ng .     ABGS.  12/25/2022 ac 40% 743/44/108   12/24/2022 ac 60% 746/41/190   12/23/2022 11 a 100% 705/95/68     VS/ PO/IO/ VENT/ DRIPS.   12/31/2022 100 98/60   12/31/2022 ac 20/400/5/.3  12/31/2022 8p dexm .2 m/k/h    PREV ADMISSION 2/11-2/25/2022 [facility] P    AGE doa cc.  62 m doa 12/23/2022 resp distress    MAIN ISSUES.  CAC 12/23/2022 rosc 25 min   Intubation 12/23/2022  Vent mgmt 12/23/2022   sedation.  Pneumothorax... 12/23/2022 cxr tension pntx-> ct  Infection.  Possible aspn pneum 12/23/2022  Possible uti 12/23/2022   .. ua 12/23/2022 w 26-50   .. mrsa 12/23 (+)   .. 12/24 et pseudo  .. 12/23 uc klebsiella   .. 12/23 zosyn   Lacticemia.  elevated lfts.  DERRICK.  Hypernatremia.  Anoxic encephalo    PMH  PMH COPD  PMH COVID (+) 2/11/2022   PMH S aureus bacteremia mssa 2/11   .. Ancef 2/12/2022 Dr Phan  PMH AICD  PMH HFREF  .. ECHO 11/20/2021 ef 20%  PMH A fib (on eliquis)     HOME MEDS INCLUDE.  apixaba 5.2   sacubitril valsartan 24 26    bumetanide 2   spironolactone 25  protonix 40   amiodarone 200     PROBLEMS ASSESSMENT RECOMMENDATIONS.  Intubation 12/23/2022  Vent mgmnt.   .. bundle dsv dsbt ltvv pplat 30 (-) PO2 60 (+) ph 7.3 (+)  sedation.  .. 12/23/2022 fentanyl 100.4p   .. 12/26/2022 is not requiring sedation (anoxic encephalo)   .. 12/30 dexmedetomid 400 in 100   Weaning.  .. 12/28-12/31/2022 failed cpap   COPD.  .. 12/23/2022 combivent .4     .. 12/23/2022 solumed 40.3 -> 12/24/2022 solumed 40   .. 12/23/2022 will taper to 40 on 12/24   .. cont rx  Code 12/23/2022    .. 12/23/2022 was  restless when he came in No defib  .. 12/23/2022 coded at 12.13 p pea arrest about 25 min rosc   .. 12/23/2022 monitor neuro recovery  .. 12/24/2022 eyes open gag (+) does not follow commands   Pneumothorax.  .. 12/23/2022 cxr tension pntx  .. 12/29/2022 ct ch unchanged small loculatd l pntx   decr abd gas and sc emphysema   .. 12/23/2022 chets tube inserted  .. cxr 12/24/2022 et tube ng tube cardiac device cm sm r effs and basal infiltr l chest tube npo pntx sc emphysema improvd   .. 12/24/2022 bleeding at chest tube insertion site and scant drainage   .. 12/24/2022 above chest tube issues dw Dr Borges and she felkt bleeding likely sec recent apixaba and no drainage likely sec to resolution of pntx  and advised observation   .. follow clinically and with imaging   .. 12/24/2022 plan is to wean off vent and then plan chest tube remoival   .. 12/26/2022 pt is unresponsive may not be weanable and may need trach peg and placement   .. 12/28/2022 will dw Dr Borges re trach and ct removal   Infection.   Possible aspn pneum 12/23/2022  Possible uti 12/23/2022   .. W 12/23- 12/24-12/25-12/26-12/27-12/28/2022   w 9.6 - 21 - 20- 13 - 14 - 12   .. pr 12/24-12/25/2022 pr 31- 24     .. ua 12/23/2022 w 26-50   .. ct cap 12/23 l chest tube sm l pntx chr small loculated r pl effs mod retropneumoperitonuem cw barotrauma   .. flu ab 12/23/2022 (-)  .. rsv 12/23/2022 (-)   .. bc 12/23 (-)   .. mrsa 12/23 (+)   .. 12/24 et pseudo  .. 12/23 uc klebsiella   .. 12/23 zosyn     CAD.  .. Tr 12/24-12/25/2022 Tr 259 -139  .. 12/23/2022 asa 81   .. monitor   Arrhythmia.  .. 12/30 amiodarone 200  CHF.  .. bnp 12/25-12/27/2022 70k - 51k   .. ho chf   .. chf meds to be reintroduced by cardio as allowed by bp   elevated lfts.  .. LFTS 12/23-12/24-12/25-12/26-12/27/2022       - [phone]- 306- 287     - 4074 - 1766- 670- 212      - 2066 - 2405 - 1824- 1041  .. 12/23/2022  ct cap pneumoretroperitoneum cw barotrauma   .. 12/23/2022 check hep profile   .. 12/23/2022 follow serially  .. elevcated lfts likely sec to anoxic hepatopathy during cac   DERRICK.  .. Cr 12/23-12/24-12/25-12/26-12/27-12/28-12/29-12/30-12/31/2022   Cr 1.7- 3.1- 4 - 4.5 - 4.2-4 - 3.3- 3.8 - 3.8   .. uo 12/25-12/26/2022 400  - 500    .. fluids and serial monitoring  .. 12/25/2022 renal calld   .. 12/26/2022 seen Dr Escalante feels derrick sec ATN recommended d5w   Hypernatremia.  .. Na 12/24-12/25-12/26/2022 Na 147 - 148 - 147  .. 12/26/2022 iv changed to d5 50    .. 12/29 free watr 250./4   AMS.  .. 12/25/2022 remains unresponsive   .. 12/25/2022 Nurse tells me that no sedation is being required   .. 12/23/2022 ct head(-)   .. 12/25/2022 neuro consultd   .. 12/26/2022 pt seen by Dr Hyatt To repeat ct in 72h  .. 12/29/2022 pt is much more awake and is moving l arm       TIME SPENT   Over 39 minutes aggregate critical care time spent on encounter; activities included   direct patient care, counseling and/or coordinating care reviewing notes, lab data/ imaging , discussion with multidisciplinary team/ patient  /family and explaining in detail risks, benefits, alternatives  of the recommendations     BIPIN DE LA CRUZ 61 m 12/23/2022 1960 DR KELVIN VANESSA

## 2022-12-31 NOTE — PROGRESS NOTE ADULT - ASSESSMENT
1.  DERRICK on CKd 3 (Baseline creatinine 1.3-1.5): Ischemic ATN  2.  Acute respiratory failure and PEA arrest  3.  Mild hypernatremia  4.  S/p large left sided tension pneumothorax  5.  Diabetes    Improving renal indices. On IV hydration. To continue current meds. Neurology follow up. Avoid nephrotoxic meds as possible. PRN IV lasix if UO drops.   Monitor blood sugar levels. Insulin coverage as needed. Free water with tube feeds. Will follow electrolytes and renal function trend.

## 2022-12-31 NOTE — PROGRESS NOTE ADULT - ASSESSMENT
Assessment/Plan  63 y/o M with a h/o HTN, dilated cardiomyopathy s/p AICD, afib/flutter, CKD (baseline Cr ~1.4), former substance abuse, remains in [facility]PCU for active treatment of   1. Cardiac Arrest  2. L Pneumothorax   3. Encephalopathy likely metabolic   4. Ischemic ATN  5. transaminitis 2/2 Shock liver 2/2 arrest   6. PNA  7. UTI     Neuro: Mental status improving, now able to follow commands. CT head x 2 negative for anoxia or acute intra-cranial pathology. Likely uremic encephalopathy given BUN > 100. Neurology consult appreciated will likely need MRI in future for further evaluation   Cardio: HD stable. Maintain MAP > 65. Continue PO amio  Pulm: Intubated, actively titrating Fio2 to maintain SPo2 > 90 %. Lung protective tidal volume 6-8 cc/kg/IBW. Keep Plateau pressure < 30. Chest tube remains in place, imaging on 29th showed anterior loculated PTX. Continue nebs,  GI: NPO with tube feeds  Renal: ATN 2/2 ischemic from arrest Scr remains elevated 3.88, making minimal urine. No indication for urgent HD at this time, although if indices continue to decline would likely need at some point, nephrology following. I/O's. Avoid nephro toxic agents.  ID: Klebsiella in urine culture, Pseudomonas growing in Sputum. On Zosyn for coverage of both. WBC downtrending, will continue to trend.    Heme: Hep Sub Q for DVT prophylaxis  Endo: ISS. Goal -180  Dispo: Prognosis grim, remains intubated following arrest.

## 2022-12-31 NOTE — PROGRESS NOTE ADULT - ASSESSMENT
62M CAD, Dilated cardiomyopathy, S/p AICD, Afib/flutter, h/o substance abuse,  presented with acute hypoxic and hypercarbic respiratory failure associated with pneumothorax which lled to cardiac arrest       Acute respiratory failure secondary to tension pneumothorax with leading to cardiac arrest   - continue vent management;  weaning vs. trach next week.  ENT aware of poss trach.   - continue chest tube to water seal, d/w CT surgery -keep chest tube in until after extubation or trach.  can keep on water seal until then.  - continue on zosyn as per ID    hypernatremia  - improved  - cont IVF and continue to monitor      Cardiomyopathy  - current cardiac issues appear to be secondary to pulm issues at this time  - hold medications for now   - hold eliquis pending possible further procedures and hospital course    DM2  - not on meds at SNF  - FS monitoring with lispro coverage scale while critically ill    DERRICK on CKD  - Likely ATN due to above, possible HF as well  - monitor creatinine,   - avoid nephrotoxic agents    Prophylactic measure  - DVT proph: heparin SQ for now  - GI proph: protonix

## 2023-01-01 VITALS — RESPIRATION RATE: 20 BRPM | OXYGEN SATURATION: 77 %

## 2023-01-01 LAB
ALBUMIN SERPL ELPH-MCNC: 1.9 G/DL — LOW (ref 3.3–5)
ALBUMIN SERPL ELPH-MCNC: 1.9 G/DL — LOW (ref 3.3–5)
ALBUMIN SERPL ELPH-MCNC: 2.1 G/DL — LOW (ref 3.3–5)
ALBUMIN SERPL ELPH-MCNC: 2.3 G/DL — LOW (ref 3.3–5)
ALBUMIN SERPL ELPH-MCNC: 2.3 G/DL — LOW (ref 3.3–5)
ALP SERPL-CCNC: 197 U/L — HIGH (ref 30–120)
ALP SERPL-CCNC: 205 U/L — HIGH (ref 30–120)
ALP SERPL-CCNC: 270 U/L — HIGH (ref 30–120)
ALP SERPL-CCNC: 273 U/L — HIGH (ref 30–120)
ALP SERPL-CCNC: 273 U/L — HIGH (ref 30–120)
ALP SERPL-CCNC: 301 U/L — HIGH (ref 30–120)
ALP SERPL-CCNC: 316 U/L — HIGH (ref 30–120)
ALP SERPL-CCNC: 346 U/L — HIGH (ref 30–120)
ALP SERPL-CCNC: 374 U/L — HIGH (ref 30–120)
ALT FLD-CCNC: 121 U/L DA — HIGH (ref 10–60)
ALT FLD-CCNC: 130 U/L DA — HIGH (ref 10–60)
ALT FLD-CCNC: 149 U/L DA — HIGH (ref 10–60)
ALT FLD-CCNC: 36 U/L DA — SIGNIFICANT CHANGE UP (ref 10–60)
ALT FLD-CCNC: 48 U/L DA — SIGNIFICANT CHANGE UP (ref 10–60)
ALT FLD-CCNC: 67 U/L DA — HIGH (ref 10–60)
ALT FLD-CCNC: 72 U/L DA — HIGH (ref 10–60)
ALT FLD-CCNC: 81 U/L DA — HIGH (ref 10–60)
ALT FLD-CCNC: 91 U/L DA — HIGH (ref 10–60)
ANION GAP SERPL CALC-SCNC: 13 MMOL/L — SIGNIFICANT CHANGE UP (ref 5–17)
ANION GAP SERPL CALC-SCNC: 13 MMOL/L — SIGNIFICANT CHANGE UP (ref 5–17)
ANION GAP SERPL CALC-SCNC: 14 MMOL/L — SIGNIFICANT CHANGE UP (ref 5–17)
ANION GAP SERPL CALC-SCNC: 15 MMOL/L — SIGNIFICANT CHANGE UP (ref 5–17)
ANION GAP SERPL CALC-SCNC: 16 MMOL/L — SIGNIFICANT CHANGE UP (ref 5–17)
ANION GAP SERPL CALC-SCNC: 16 MMOL/L — SIGNIFICANT CHANGE UP (ref 5–17)
ANION GAP SERPL CALC-SCNC: 17 MMOL/L — SIGNIFICANT CHANGE UP (ref 5–17)
ANION GAP SERPL CALC-SCNC: 21 MMOL/L — HIGH (ref 5–17)
APPEARANCE UR: CLEAR — SIGNIFICANT CHANGE UP
APTT BLD: 32.2 SEC — SIGNIFICANT CHANGE UP (ref 27.5–35.5)
AST SERPL-CCNC: 39 U/L — SIGNIFICANT CHANGE UP (ref 10–40)
AST SERPL-CCNC: 42 U/L — HIGH (ref 10–40)
AST SERPL-CCNC: 44 U/L — HIGH (ref 10–40)
AST SERPL-CCNC: 44 U/L — HIGH (ref 10–40)
AST SERPL-CCNC: 50 U/L — HIGH (ref 10–40)
AST SERPL-CCNC: 51 U/L — HIGH (ref 10–40)
AST SERPL-CCNC: 59 U/L — HIGH (ref 10–40)
AST SERPL-CCNC: 62 U/L — HIGH (ref 10–40)
AST SERPL-CCNC: 63 U/L — HIGH (ref 10–40)
BACTERIA # UR AUTO: ABNORMAL
BASE EXCESS BLDA CALC-SCNC: -10.2 MMOL/L — LOW (ref -2–3)
BASE EXCESS BLDA CALC-SCNC: -8.1 MMOL/L — LOW (ref -2–3)
BASE EXCESS BLDA CALC-SCNC: -9 MMOL/L — LOW (ref -2–3)
BASE EXCESS BLDA CALC-SCNC: -9.6 MMOL/L — LOW (ref -2–3)
BASOPHILS # BLD AUTO: 0.03 K/UL — SIGNIFICANT CHANGE UP (ref 0–0.2)
BASOPHILS # BLD AUTO: 0.04 K/UL — SIGNIFICANT CHANGE UP (ref 0–0.2)
BASOPHILS # BLD AUTO: 0.05 K/UL — SIGNIFICANT CHANGE UP (ref 0–0.2)
BASOPHILS NFR BLD AUTO: 0.2 % — SIGNIFICANT CHANGE UP (ref 0–2)
BASOPHILS NFR BLD AUTO: 0.3 % — SIGNIFICANT CHANGE UP (ref 0–2)
BASOPHILS NFR BLD AUTO: 0.4 % — SIGNIFICANT CHANGE UP (ref 0–2)
BILIRUB SERPL-MCNC: 0.7 MG/DL — SIGNIFICANT CHANGE UP (ref 0.2–1.2)
BILIRUB SERPL-MCNC: 0.7 MG/DL — SIGNIFICANT CHANGE UP (ref 0.2–1.2)
BILIRUB SERPL-MCNC: 0.8 MG/DL — SIGNIFICANT CHANGE UP (ref 0.2–1.2)
BILIRUB SERPL-MCNC: 1 MG/DL — SIGNIFICANT CHANGE UP (ref 0.2–1.2)
BILIRUB SERPL-MCNC: 1 MG/DL — SIGNIFICANT CHANGE UP (ref 0.2–1.2)
BILIRUB UR-MCNC: NEGATIVE — SIGNIFICANT CHANGE UP
BLD GP AB SCN SERPL QL: SIGNIFICANT CHANGE UP
BUN SERPL-MCNC: 110 MG/DL — HIGH (ref 7–23)
BUN SERPL-MCNC: 112 MG/DL — HIGH (ref 7–23)
BUN SERPL-MCNC: 113 MG/DL — HIGH (ref 7–23)
BUN SERPL-MCNC: 120 MG/DL — HIGH (ref 7–23)
BUN SERPL-MCNC: 125 MG/DL — HIGH (ref 7–23)
BUN SERPL-MCNC: 126 MG/DL — HIGH (ref 7–23)
BUN SERPL-MCNC: 127 MG/DL — HIGH (ref 7–23)
BUN SERPL-MCNC: 129 MG/DL — HIGH (ref 7–23)
BUN SERPL-MCNC: 130 MG/DL — HIGH (ref 7–23)
BUN SERPL-MCNC: 131 MG/DL — HIGH (ref 7–23)
BUN SERPL-MCNC: 141 MG/DL — HIGH (ref 7–23)
BUN SERPL-MCNC: 142 MG/DL — HIGH (ref 7–23)
BUN SERPL-MCNC: 144 MG/DL — HIGH (ref 7–23)
BUN SERPL-MCNC: 88 MG/DL — HIGH (ref 7–23)
BUN SERPL-MCNC: 90 MG/DL — HIGH (ref 7–23)
BUN SERPL-MCNC: 93 MG/DL — HIGH (ref 7–23)
CALCIUM SERPL-MCNC: 10 MG/DL — SIGNIFICANT CHANGE UP (ref 8.4–10.5)
CALCIUM SERPL-MCNC: 7.9 MG/DL — LOW (ref 8.4–10.5)
CALCIUM SERPL-MCNC: 8.1 MG/DL — LOW (ref 8.4–10.5)
CALCIUM SERPL-MCNC: 8.3 MG/DL — LOW (ref 8.4–10.5)
CALCIUM SERPL-MCNC: 8.4 MG/DL — SIGNIFICANT CHANGE UP (ref 8.4–10.5)
CALCIUM SERPL-MCNC: 8.4 MG/DL — SIGNIFICANT CHANGE UP (ref 8.4–10.5)
CALCIUM SERPL-MCNC: 8.7 MG/DL — SIGNIFICANT CHANGE UP (ref 8.4–10.5)
CALCIUM SERPL-MCNC: 8.8 MG/DL — SIGNIFICANT CHANGE UP (ref 8.4–10.5)
CALCIUM SERPL-MCNC: 8.9 MG/DL — SIGNIFICANT CHANGE UP (ref 8.4–10.5)
CALCIUM SERPL-MCNC: 9.1 MG/DL — SIGNIFICANT CHANGE UP (ref 8.4–10.5)
CALCIUM SERPL-MCNC: 9.1 MG/DL — SIGNIFICANT CHANGE UP (ref 8.4–10.5)
CALCIUM SERPL-MCNC: 9.3 MG/DL — SIGNIFICANT CHANGE UP (ref 8.4–10.5)
CALCIUM SERPL-MCNC: 9.3 MG/DL — SIGNIFICANT CHANGE UP (ref 8.4–10.5)
CALCIUM SERPL-MCNC: 9.7 MG/DL — SIGNIFICANT CHANGE UP (ref 8.4–10.5)
CHLORIDE SERPL-SCNC: 101 MMOL/L — SIGNIFICANT CHANGE UP (ref 96–108)
CHLORIDE SERPL-SCNC: 103 MMOL/L — SIGNIFICANT CHANGE UP (ref 96–108)
CHLORIDE SERPL-SCNC: 103 MMOL/L — SIGNIFICANT CHANGE UP (ref 96–108)
CHLORIDE SERPL-SCNC: 104 MMOL/L — SIGNIFICANT CHANGE UP (ref 96–108)
CHLORIDE SERPL-SCNC: 105 MMOL/L — SIGNIFICANT CHANGE UP (ref 96–108)
CHLORIDE SERPL-SCNC: 106 MMOL/L — SIGNIFICANT CHANGE UP (ref 96–108)
CHLORIDE SERPL-SCNC: 108 MMOL/L — SIGNIFICANT CHANGE UP (ref 96–108)
CHLORIDE SERPL-SCNC: 95 MMOL/L — LOW (ref 96–108)
CHLORIDE SERPL-SCNC: 96 MMOL/L — SIGNIFICANT CHANGE UP (ref 96–108)
CHLORIDE SERPL-SCNC: 97 MMOL/L — SIGNIFICANT CHANGE UP (ref 96–108)
CHLORIDE SERPL-SCNC: 99 MMOL/L — SIGNIFICANT CHANGE UP (ref 96–108)
CHOLEST SERPL-MCNC: 160 MG/DL — SIGNIFICANT CHANGE UP
CO2 SERPL-SCNC: 18 MMOL/L — LOW (ref 22–31)
CO2 SERPL-SCNC: 19 MMOL/L — LOW (ref 22–31)
CO2 SERPL-SCNC: 19 MMOL/L — LOW (ref 22–31)
CO2 SERPL-SCNC: 20 MMOL/L — LOW (ref 22–31)
CO2 SERPL-SCNC: 21 MMOL/L — LOW (ref 22–31)
CO2 SERPL-SCNC: 22 MMOL/L — SIGNIFICANT CHANGE UP (ref 22–31)
CO2 SERPL-SCNC: 23 MMOL/L — SIGNIFICANT CHANGE UP (ref 22–31)
CO2 SERPL-SCNC: 24 MMOL/L — SIGNIFICANT CHANGE UP (ref 22–31)
COLOR SPEC: YELLOW — SIGNIFICANT CHANGE UP
CREAT SERPL-MCNC: 3.34 MG/DL — HIGH (ref 0.5–1.3)
CREAT SERPL-MCNC: 3.46 MG/DL — HIGH (ref 0.5–1.3)
CREAT SERPL-MCNC: 3.48 MG/DL — HIGH (ref 0.5–1.3)
CREAT SERPL-MCNC: 3.85 MG/DL — HIGH (ref 0.5–1.3)
CREAT SERPL-MCNC: 3.88 MG/DL — HIGH (ref 0.5–1.3)
CREAT SERPL-MCNC: 4.19 MG/DL — HIGH (ref 0.5–1.3)
CREAT SERPL-MCNC: 4.35 MG/DL — HIGH (ref 0.5–1.3)
CREAT SERPL-MCNC: 4.46 MG/DL — HIGH (ref 0.5–1.3)
CREAT SERPL-MCNC: 4.69 MG/DL — HIGH (ref 0.5–1.3)
CREAT SERPL-MCNC: 4.85 MG/DL — HIGH (ref 0.5–1.3)
CREAT SERPL-MCNC: 4.86 MG/DL — HIGH (ref 0.5–1.3)
CREAT SERPL-MCNC: 4.89 MG/DL — HIGH (ref 0.5–1.3)
CREAT SERPL-MCNC: 5.1 MG/DL — HIGH (ref 0.5–1.3)
CREAT SERPL-MCNC: 5.49 MG/DL — HIGH (ref 0.5–1.3)
CREAT SERPL-MCNC: 5.75 MG/DL — HIGH (ref 0.5–1.3)
CREAT SERPL-MCNC: 6.35 MG/DL — HIGH (ref 0.5–1.3)
DIFF PNL FLD: ABNORMAL
EGFR: 11 ML/MIN/1.73M2 — LOW
EGFR: 11 ML/MIN/1.73M2 — LOW
EGFR: 12 ML/MIN/1.73M2 — LOW
EGFR: 13 ML/MIN/1.73M2 — LOW
EGFR: 14 ML/MIN/1.73M2 — LOW
EGFR: 15 ML/MIN/1.73M2 — LOW
EGFR: 15 ML/MIN/1.73M2 — LOW
EGFR: 17 ML/MIN/1.73M2 — LOW
EGFR: 17 ML/MIN/1.73M2 — LOW
EGFR: 19 ML/MIN/1.73M2 — LOW
EGFR: 19 ML/MIN/1.73M2 — LOW
EGFR: 20 ML/MIN/1.73M2 — LOW
EGFR: 9 ML/MIN/1.73M2 — LOW
EOSINOPHIL # BLD AUTO: 0.07 K/UL — SIGNIFICANT CHANGE UP (ref 0–0.5)
EOSINOPHIL # BLD AUTO: 0.22 K/UL — SIGNIFICANT CHANGE UP (ref 0–0.5)
EOSINOPHIL # BLD AUTO: 0.4 K/UL — SIGNIFICANT CHANGE UP (ref 0–0.5)
EOSINOPHIL NFR BLD AUTO: 0.4 % — SIGNIFICANT CHANGE UP (ref 0–6)
EOSINOPHIL NFR BLD AUTO: 1.5 % — SIGNIFICANT CHANGE UP (ref 0–6)
EOSINOPHIL NFR BLD AUTO: 3.4 % — SIGNIFICANT CHANGE UP (ref 0–6)
EPI CELLS # UR: SIGNIFICANT CHANGE UP
GLUCOSE BLDC GLUCOMTR-MCNC: 100 MG/DL — HIGH (ref 70–99)
GLUCOSE BLDC GLUCOMTR-MCNC: 100 MG/DL — HIGH (ref 70–99)
GLUCOSE BLDC GLUCOMTR-MCNC: 102 MG/DL — HIGH (ref 70–99)
GLUCOSE BLDC GLUCOMTR-MCNC: 102 MG/DL — HIGH (ref 70–99)
GLUCOSE BLDC GLUCOMTR-MCNC: 103 MG/DL — HIGH (ref 70–99)
GLUCOSE BLDC GLUCOMTR-MCNC: 105 MG/DL — HIGH (ref 70–99)
GLUCOSE BLDC GLUCOMTR-MCNC: 105 MG/DL — HIGH (ref 70–99)
GLUCOSE BLDC GLUCOMTR-MCNC: 107 MG/DL — HIGH (ref 70–99)
GLUCOSE BLDC GLUCOMTR-MCNC: 108 MG/DL — HIGH (ref 70–99)
GLUCOSE BLDC GLUCOMTR-MCNC: 109 MG/DL — HIGH (ref 70–99)
GLUCOSE BLDC GLUCOMTR-MCNC: 110 MG/DL — HIGH (ref 70–99)
GLUCOSE BLDC GLUCOMTR-MCNC: 112 MG/DL — HIGH (ref 70–99)
GLUCOSE BLDC GLUCOMTR-MCNC: 113 MG/DL — HIGH (ref 70–99)
GLUCOSE BLDC GLUCOMTR-MCNC: 114 MG/DL — HIGH (ref 70–99)
GLUCOSE BLDC GLUCOMTR-MCNC: 115 MG/DL — HIGH (ref 70–99)
GLUCOSE BLDC GLUCOMTR-MCNC: 116 MG/DL — HIGH (ref 70–99)
GLUCOSE BLDC GLUCOMTR-MCNC: 117 MG/DL — HIGH (ref 70–99)
GLUCOSE BLDC GLUCOMTR-MCNC: 119 MG/DL — HIGH (ref 70–99)
GLUCOSE BLDC GLUCOMTR-MCNC: 122 MG/DL — HIGH (ref 70–99)
GLUCOSE BLDC GLUCOMTR-MCNC: 123 MG/DL — HIGH (ref 70–99)
GLUCOSE BLDC GLUCOMTR-MCNC: 124 MG/DL — HIGH (ref 70–99)
GLUCOSE BLDC GLUCOMTR-MCNC: 124 MG/DL — HIGH (ref 70–99)
GLUCOSE BLDC GLUCOMTR-MCNC: 127 MG/DL — HIGH (ref 70–99)
GLUCOSE BLDC GLUCOMTR-MCNC: 128 MG/DL — HIGH (ref 70–99)
GLUCOSE BLDC GLUCOMTR-MCNC: 130 MG/DL — HIGH (ref 70–99)
GLUCOSE BLDC GLUCOMTR-MCNC: 130 MG/DL — HIGH (ref 70–99)
GLUCOSE BLDC GLUCOMTR-MCNC: 132 MG/DL — HIGH (ref 70–99)
GLUCOSE BLDC GLUCOMTR-MCNC: 133 MG/DL — HIGH (ref 70–99)
GLUCOSE BLDC GLUCOMTR-MCNC: 134 MG/DL — HIGH (ref 70–99)
GLUCOSE BLDC GLUCOMTR-MCNC: 135 MG/DL — HIGH (ref 70–99)
GLUCOSE BLDC GLUCOMTR-MCNC: 137 MG/DL — HIGH (ref 70–99)
GLUCOSE BLDC GLUCOMTR-MCNC: 138 MG/DL — HIGH (ref 70–99)
GLUCOSE BLDC GLUCOMTR-MCNC: 138 MG/DL — HIGH (ref 70–99)
GLUCOSE BLDC GLUCOMTR-MCNC: 143 MG/DL — HIGH (ref 70–99)
GLUCOSE BLDC GLUCOMTR-MCNC: 146 MG/DL — HIGH (ref 70–99)
GLUCOSE BLDC GLUCOMTR-MCNC: 149 MG/DL — HIGH (ref 70–99)
GLUCOSE BLDC GLUCOMTR-MCNC: 149 MG/DL — HIGH (ref 70–99)
GLUCOSE BLDC GLUCOMTR-MCNC: 150 MG/DL — HIGH (ref 70–99)
GLUCOSE BLDC GLUCOMTR-MCNC: 154 MG/DL — HIGH (ref 70–99)
GLUCOSE BLDC GLUCOMTR-MCNC: 163 MG/DL — HIGH (ref 70–99)
GLUCOSE BLDC GLUCOMTR-MCNC: 166 MG/DL — HIGH (ref 70–99)
GLUCOSE BLDC GLUCOMTR-MCNC: 181 MG/DL — HIGH (ref 70–99)
GLUCOSE BLDC GLUCOMTR-MCNC: 86 MG/DL — SIGNIFICANT CHANGE UP (ref 70–99)
GLUCOSE BLDC GLUCOMTR-MCNC: 91 MG/DL — SIGNIFICANT CHANGE UP (ref 70–99)
GLUCOSE BLDC GLUCOMTR-MCNC: 96 MG/DL — SIGNIFICANT CHANGE UP (ref 70–99)
GLUCOSE BLDC GLUCOMTR-MCNC: 97 MG/DL — SIGNIFICANT CHANGE UP (ref 70–99)
GLUCOSE BLDC GLUCOMTR-MCNC: 98 MG/DL — SIGNIFICANT CHANGE UP (ref 70–99)
GLUCOSE BLDC GLUCOMTR-MCNC: 98 MG/DL — SIGNIFICANT CHANGE UP (ref 70–99)
GLUCOSE SERPL-MCNC: 107 MG/DL — HIGH (ref 70–99)
GLUCOSE SERPL-MCNC: 108 MG/DL — HIGH (ref 70–99)
GLUCOSE SERPL-MCNC: 111 MG/DL — HIGH (ref 70–99)
GLUCOSE SERPL-MCNC: 114 MG/DL — HIGH (ref 70–99)
GLUCOSE SERPL-MCNC: 115 MG/DL — HIGH (ref 70–99)
GLUCOSE SERPL-MCNC: 120 MG/DL — HIGH (ref 70–99)
GLUCOSE SERPL-MCNC: 121 MG/DL — HIGH (ref 70–99)
GLUCOSE SERPL-MCNC: 124 MG/DL — HIGH (ref 70–99)
GLUCOSE SERPL-MCNC: 125 MG/DL — HIGH (ref 70–99)
GLUCOSE SERPL-MCNC: 128 MG/DL — HIGH (ref 70–99)
GLUCOSE SERPL-MCNC: 129 MG/DL — HIGH (ref 70–99)
GLUCOSE SERPL-MCNC: 141 MG/DL — HIGH (ref 70–99)
GLUCOSE SERPL-MCNC: 174 MG/DL — HIGH (ref 70–99)
GLUCOSE SERPL-MCNC: 176 MG/DL — HIGH (ref 70–99)
GLUCOSE SERPL-MCNC: 202 MG/DL — HIGH (ref 70–99)
GLUCOSE SERPL-MCNC: 223 MG/DL — HIGH (ref 70–99)
GLUCOSE UR QL: NEGATIVE MG/DL — SIGNIFICANT CHANGE UP
GRAM STN FLD: SIGNIFICANT CHANGE UP
HCO3 BLDA-SCNC: 16 MMOL/L — LOW (ref 21–28)
HCO3 BLDA-SCNC: 16 MMOL/L — LOW (ref 21–28)
HCO3 BLDA-SCNC: 17 MMOL/L — LOW (ref 21–28)
HCO3 BLDA-SCNC: 17 MMOL/L — LOW (ref 21–28)
HCT VFR BLD CALC: 30.6 % — LOW (ref 39–50)
HCT VFR BLD CALC: 30.8 % — LOW (ref 39–50)
HCT VFR BLD CALC: 31 % — LOW (ref 39–50)
HCT VFR BLD CALC: 32.2 % — LOW (ref 39–50)
HCT VFR BLD CALC: 32.3 % — LOW (ref 39–50)
HCT VFR BLD CALC: 32.8 % — LOW (ref 39–50)
HCT VFR BLD CALC: 33.4 % — LOW (ref 39–50)
HCT VFR BLD CALC: 34.5 % — LOW (ref 39–50)
HCT VFR BLD CALC: 34.5 % — LOW (ref 39–50)
HCT VFR BLD CALC: 34.8 % — LOW (ref 39–50)
HCT VFR BLD CALC: 36.1 % — LOW (ref 39–50)
HCT VFR BLD CALC: 36.7 % — LOW (ref 39–50)
HCT VFR BLD CALC: 42.6 % — SIGNIFICANT CHANGE UP (ref 39–50)
HDLC SERPL-MCNC: 36 MG/DL — LOW
HGB BLD-MCNC: 10.1 G/DL — LOW (ref 13–17)
HGB BLD-MCNC: 10.2 G/DL — LOW (ref 13–17)
HGB BLD-MCNC: 10.3 G/DL — LOW (ref 13–17)
HGB BLD-MCNC: 10.5 G/DL — LOW (ref 13–17)
HGB BLD-MCNC: 10.8 G/DL — LOW (ref 13–17)
HGB BLD-MCNC: 10.9 G/DL — LOW (ref 13–17)
HGB BLD-MCNC: 11 G/DL — LOW (ref 13–17)
HGB BLD-MCNC: 11.2 G/DL — LOW (ref 13–17)
HGB BLD-MCNC: 11.4 G/DL — LOW (ref 13–17)
HGB BLD-MCNC: 11.6 G/DL — LOW (ref 13–17)
HGB BLD-MCNC: 11.7 G/DL — LOW (ref 13–17)
HGB BLD-MCNC: 13.9 G/DL — SIGNIFICANT CHANGE UP (ref 13–17)
HGB BLD-MCNC: 9.7 G/DL — LOW (ref 13–17)
HOROWITZ INDEX BLDA+IHG-RTO: 30 — SIGNIFICANT CHANGE UP
HOROWITZ INDEX BLDA+IHG-RTO: 40 — SIGNIFICANT CHANGE UP
IMM GRANULOCYTES NFR BLD AUTO: 1.3 % — HIGH (ref 0–0.9)
IMM GRANULOCYTES NFR BLD AUTO: 1.5 % — HIGH (ref 0–0.9)
IMM GRANULOCYTES NFR BLD AUTO: 4 % — HIGH (ref 0–0.9)
INR BLD: 0.97 RATIO — SIGNIFICANT CHANGE UP (ref 0.88–1.16)
INR BLD: 1.06 RATIO — SIGNIFICANT CHANGE UP (ref 0.88–1.16)
INR BLD: 1.2 RATIO — HIGH (ref 0.88–1.16)
KETONES UR-MCNC: NEGATIVE — SIGNIFICANT CHANGE UP
LACTATE SERPL-SCNC: 0.5 MMOL/L — LOW (ref 0.7–2)
LACTATE SERPL-SCNC: 0.5 MMOL/L — LOW (ref 0.7–2)
LEUKOCYTE ESTERASE UR-ACNC: NEGATIVE — SIGNIFICANT CHANGE UP
LIPID PNL WITH DIRECT LDL SERPL: 98 MG/DL — SIGNIFICANT CHANGE UP
LYMPHOCYTES # BLD AUTO: 0.53 K/UL — LOW (ref 1–3.3)
LYMPHOCYTES # BLD AUTO: 0.62 K/UL — LOW (ref 1–3.3)
LYMPHOCYTES # BLD AUTO: 1.84 K/UL — SIGNIFICANT CHANGE UP (ref 1–3.3)
LYMPHOCYTES # BLD AUTO: 15.5 % — SIGNIFICANT CHANGE UP (ref 13–44)
LYMPHOCYTES # BLD AUTO: 3.2 % — LOW (ref 13–44)
LYMPHOCYTES # BLD AUTO: 3.6 % — LOW (ref 13–44)
MAGNESIUM SERPL-MCNC: 2.1 MG/DL — SIGNIFICANT CHANGE UP (ref 1.6–2.6)
MAGNESIUM SERPL-MCNC: 2.3 MG/DL — SIGNIFICANT CHANGE UP (ref 1.6–2.6)
MAGNESIUM SERPL-MCNC: 2.4 MG/DL — SIGNIFICANT CHANGE UP (ref 1.6–2.6)
MAGNESIUM SERPL-MCNC: 2.6 MG/DL — SIGNIFICANT CHANGE UP (ref 1.6–2.6)
MAGNESIUM SERPL-MCNC: 2.8 MG/DL — HIGH (ref 1.6–2.6)
MAGNESIUM SERPL-MCNC: 2.9 MG/DL — HIGH (ref 1.6–2.6)
MAGNESIUM SERPL-MCNC: 3 MG/DL — HIGH (ref 1.6–2.6)
MAGNESIUM SERPL-MCNC: 3.6 MG/DL — HIGH (ref 1.6–2.6)
MCHC RBC-ENTMCNC: 29.6 PG — SIGNIFICANT CHANGE UP (ref 27–34)
MCHC RBC-ENTMCNC: 29.8 PG — SIGNIFICANT CHANGE UP (ref 27–34)
MCHC RBC-ENTMCNC: 29.9 PG — SIGNIFICANT CHANGE UP (ref 27–34)
MCHC RBC-ENTMCNC: 29.9 PG — SIGNIFICANT CHANGE UP (ref 27–34)
MCHC RBC-ENTMCNC: 30 PG — SIGNIFICANT CHANGE UP (ref 27–34)
MCHC RBC-ENTMCNC: 30.1 PG — SIGNIFICANT CHANGE UP (ref 27–34)
MCHC RBC-ENTMCNC: 30.1 PG — SIGNIFICANT CHANGE UP (ref 27–34)
MCHC RBC-ENTMCNC: 30.2 PG — SIGNIFICANT CHANGE UP (ref 27–34)
MCHC RBC-ENTMCNC: 30.2 PG — SIGNIFICANT CHANGE UP (ref 27–34)
MCHC RBC-ENTMCNC: 30.4 PG — SIGNIFICANT CHANGE UP (ref 27–34)
MCHC RBC-ENTMCNC: 30.6 PG — SIGNIFICANT CHANGE UP (ref 27–34)
MCHC RBC-ENTMCNC: 31.3 GM/DL — LOW (ref 32–36)
MCHC RBC-ENTMCNC: 31.4 GM/DL — LOW (ref 32–36)
MCHC RBC-ENTMCNC: 31.6 GM/DL — LOW (ref 32–36)
MCHC RBC-ENTMCNC: 31.6 GM/DL — LOW (ref 32–36)
MCHC RBC-ENTMCNC: 31.7 GM/DL — LOW (ref 32–36)
MCHC RBC-ENTMCNC: 31.9 GM/DL — LOW (ref 32–36)
MCHC RBC-ENTMCNC: 32.2 GM/DL — SIGNIFICANT CHANGE UP (ref 32–36)
MCHC RBC-ENTMCNC: 32.6 GM/DL — SIGNIFICANT CHANGE UP (ref 32–36)
MCHC RBC-ENTMCNC: 32.6 GM/DL — SIGNIFICANT CHANGE UP (ref 32–36)
MCHC RBC-ENTMCNC: 32.9 GM/DL — SIGNIFICANT CHANGE UP (ref 32–36)
MCHC RBC-ENTMCNC: 33.6 GM/DL — SIGNIFICANT CHANGE UP (ref 32–36)
MCHC RBC-ENTMCNC: 33.9 GM/DL — SIGNIFICANT CHANGE UP (ref 32–36)
MCHC RBC-ENTMCNC: 34.1 GM/DL — SIGNIFICANT CHANGE UP (ref 32–36)
MCV RBC AUTO: 89.8 FL — SIGNIFICANT CHANGE UP (ref 80–100)
MCV RBC AUTO: 90.4 FL — SIGNIFICANT CHANGE UP (ref 80–100)
MCV RBC AUTO: 91 FL — SIGNIFICANT CHANGE UP (ref 80–100)
MCV RBC AUTO: 91.5 FL — SIGNIFICANT CHANGE UP (ref 80–100)
MCV RBC AUTO: 91.6 FL — SIGNIFICANT CHANGE UP (ref 80–100)
MCV RBC AUTO: 92.5 FL — SIGNIFICANT CHANGE UP (ref 80–100)
MCV RBC AUTO: 92.8 FL — SIGNIFICANT CHANGE UP (ref 80–100)
MCV RBC AUTO: 94.2 FL — SIGNIFICANT CHANGE UP (ref 80–100)
MCV RBC AUTO: 94.3 FL — SIGNIFICANT CHANGE UP (ref 80–100)
MCV RBC AUTO: 94.8 FL — SIGNIFICANT CHANGE UP (ref 80–100)
MCV RBC AUTO: 95.6 FL — SIGNIFICANT CHANGE UP (ref 80–100)
MCV RBC AUTO: 95.8 FL — SIGNIFICANT CHANGE UP (ref 80–100)
MCV RBC AUTO: 96.3 FL — SIGNIFICANT CHANGE UP (ref 80–100)
MONOCYTES # BLD AUTO: 1.02 K/UL — HIGH (ref 0–0.9)
MONOCYTES # BLD AUTO: 1.08 K/UL — HIGH (ref 0–0.9)
MONOCYTES # BLD AUTO: 1.53 K/UL — HIGH (ref 0–0.9)
MONOCYTES NFR BLD AUTO: 6.9 % — SIGNIFICANT CHANGE UP (ref 2–14)
MONOCYTES NFR BLD AUTO: 7.8 % — SIGNIFICANT CHANGE UP (ref 2–14)
MONOCYTES NFR BLD AUTO: 9.1 % — SIGNIFICANT CHANGE UP (ref 2–14)
NEUTROPHILS # BLD AUTO: 12.82 K/UL — HIGH (ref 1.8–7.4)
NEUTROPHILS # BLD AUTO: 17.04 K/UL — HIGH (ref 1.8–7.4)
NEUTROPHILS # BLD AUTO: 8.04 K/UL — HIGH (ref 1.8–7.4)
NEUTROPHILS NFR BLD AUTO: 67.6 % — SIGNIFICANT CHANGE UP (ref 43–77)
NEUTROPHILS NFR BLD AUTO: 86.4 % — HIGH (ref 43–77)
NEUTROPHILS NFR BLD AUTO: 86.9 % — HIGH (ref 43–77)
NITRITE UR-MCNC: NEGATIVE — SIGNIFICANT CHANGE UP
NON HDL CHOLESTEROL: 125 MG/DL — SIGNIFICANT CHANGE UP
NRBC # BLD: 0 /100 WBCS — SIGNIFICANT CHANGE UP (ref 0–0)
PCO2 BLDA: 28 MMHG — LOW (ref 35–48)
PCO2 BLDA: 30 MMHG — LOW (ref 35–48)
PCO2 BLDA: 32 MMHG — LOW (ref 35–48)
PCO2 BLDA: 32 MMHG — LOW (ref 35–48)
PH BLDA: 7.31 — LOW (ref 7.35–7.45)
PH BLDA: 7.33 — LOW (ref 7.35–7.45)
PH BLDA: 7.34 — LOW (ref 7.35–7.45)
PH BLDA: 7.39 — SIGNIFICANT CHANGE UP (ref 7.35–7.45)
PH UR: 5 — SIGNIFICANT CHANGE UP (ref 5–8)
PHOSPHATE SERPL-MCNC: 4.8 MG/DL — HIGH (ref 2.5–4.5)
PHOSPHATE SERPL-MCNC: 4.9 MG/DL — HIGH (ref 2.5–4.5)
PHOSPHATE SERPL-MCNC: 5.5 MG/DL — HIGH (ref 2.5–4.5)
PHOSPHATE SERPL-MCNC: 5.8 MG/DL — HIGH (ref 2.5–4.5)
PHOSPHATE SERPL-MCNC: 7 MG/DL — HIGH (ref 2.5–4.5)
PHOSPHATE SERPL-MCNC: 7.1 MG/DL — HIGH (ref 2.5–4.5)
PHOSPHATE SERPL-MCNC: 7.1 MG/DL — HIGH (ref 2.5–4.5)
PHOSPHATE SERPL-MCNC: 7.3 MG/DL — HIGH (ref 2.5–4.5)
PHOSPHATE SERPL-MCNC: 7.3 MG/DL — HIGH (ref 2.5–4.5)
PHOSPHATE SERPL-MCNC: 7.4 MG/DL — HIGH (ref 2.5–4.5)
PHOSPHATE SERPL-MCNC: 8.1 MG/DL — HIGH (ref 2.5–4.5)
PLATELET # BLD AUTO: 141 K/UL — LOW (ref 150–400)
PLATELET # BLD AUTO: 151 K/UL — SIGNIFICANT CHANGE UP (ref 150–400)
PLATELET # BLD AUTO: 152 K/UL — SIGNIFICANT CHANGE UP (ref 150–400)
PLATELET # BLD AUTO: 157 K/UL — SIGNIFICANT CHANGE UP (ref 150–400)
PLATELET # BLD AUTO: 180 K/UL — SIGNIFICANT CHANGE UP (ref 150–400)
PLATELET # BLD AUTO: 211 K/UL — SIGNIFICANT CHANGE UP (ref 150–400)
PLATELET # BLD AUTO: 218 K/UL — SIGNIFICANT CHANGE UP (ref 150–400)
PLATELET # BLD AUTO: 219 K/UL — SIGNIFICANT CHANGE UP (ref 150–400)
PLATELET # BLD AUTO: 221 K/UL — SIGNIFICANT CHANGE UP (ref 150–400)
PLATELET # BLD AUTO: 242 K/UL — SIGNIFICANT CHANGE UP (ref 150–400)
PLATELET # BLD AUTO: 252 K/UL — SIGNIFICANT CHANGE UP (ref 150–400)
PLATELET # BLD AUTO: 264 K/UL — SIGNIFICANT CHANGE UP (ref 150–400)
PLATELET # BLD AUTO: 291 K/UL — SIGNIFICANT CHANGE UP (ref 150–400)
PO2 BLDA: 132 MMHG — HIGH (ref 83–108)
PO2 BLDA: 65 MMHG — LOW (ref 83–108)
PO2 BLDA: 70 MMHG — LOW (ref 83–108)
PO2 BLDA: 80 MMHG — LOW (ref 83–108)
POTASSIUM SERPL-MCNC: 3.6 MMOL/L — SIGNIFICANT CHANGE UP (ref 3.5–5.3)
POTASSIUM SERPL-MCNC: 3.7 MMOL/L — SIGNIFICANT CHANGE UP (ref 3.5–5.3)
POTASSIUM SERPL-MCNC: 3.7 MMOL/L — SIGNIFICANT CHANGE UP (ref 3.5–5.3)
POTASSIUM SERPL-MCNC: 3.8 MMOL/L — SIGNIFICANT CHANGE UP (ref 3.5–5.3)
POTASSIUM SERPL-MCNC: 3.9 MMOL/L — SIGNIFICANT CHANGE UP (ref 3.5–5.3)
POTASSIUM SERPL-MCNC: 4 MMOL/L — SIGNIFICANT CHANGE UP (ref 3.5–5.3)
POTASSIUM SERPL-MCNC: 4.1 MMOL/L — SIGNIFICANT CHANGE UP (ref 3.5–5.3)
POTASSIUM SERPL-MCNC: 4.4 MMOL/L — SIGNIFICANT CHANGE UP (ref 3.5–5.3)
POTASSIUM SERPL-MCNC: 4.4 MMOL/L — SIGNIFICANT CHANGE UP (ref 3.5–5.3)
POTASSIUM SERPL-MCNC: 4.5 MMOL/L — SIGNIFICANT CHANGE UP (ref 3.5–5.3)
POTASSIUM SERPL-MCNC: 4.9 MMOL/L — SIGNIFICANT CHANGE UP (ref 3.5–5.3)
POTASSIUM SERPL-MCNC: 5.3 MMOL/L — SIGNIFICANT CHANGE UP (ref 3.5–5.3)
POTASSIUM SERPL-SCNC: 3.6 MMOL/L — SIGNIFICANT CHANGE UP (ref 3.5–5.3)
POTASSIUM SERPL-SCNC: 3.7 MMOL/L — SIGNIFICANT CHANGE UP (ref 3.5–5.3)
POTASSIUM SERPL-SCNC: 3.7 MMOL/L — SIGNIFICANT CHANGE UP (ref 3.5–5.3)
POTASSIUM SERPL-SCNC: 3.8 MMOL/L — SIGNIFICANT CHANGE UP (ref 3.5–5.3)
POTASSIUM SERPL-SCNC: 3.9 MMOL/L — SIGNIFICANT CHANGE UP (ref 3.5–5.3)
POTASSIUM SERPL-SCNC: 4 MMOL/L — SIGNIFICANT CHANGE UP (ref 3.5–5.3)
POTASSIUM SERPL-SCNC: 4.1 MMOL/L — SIGNIFICANT CHANGE UP (ref 3.5–5.3)
POTASSIUM SERPL-SCNC: 4.4 MMOL/L — SIGNIFICANT CHANGE UP (ref 3.5–5.3)
POTASSIUM SERPL-SCNC: 4.4 MMOL/L — SIGNIFICANT CHANGE UP (ref 3.5–5.3)
POTASSIUM SERPL-SCNC: 4.5 MMOL/L — SIGNIFICANT CHANGE UP (ref 3.5–5.3)
POTASSIUM SERPL-SCNC: 4.9 MMOL/L — SIGNIFICANT CHANGE UP (ref 3.5–5.3)
POTASSIUM SERPL-SCNC: 5.3 MMOL/L — SIGNIFICANT CHANGE UP (ref 3.5–5.3)
PROCALCITONIN SERPL-MCNC: 12.2 NG/ML — HIGH (ref 0.02–0.1)
PROCALCITONIN SERPL-MCNC: 16.5 NG/ML — HIGH (ref 0.02–0.1)
PROT SERPL-MCNC: 5.1 G/DL — LOW (ref 6–8.3)
PROT SERPL-MCNC: 5.6 G/DL — LOW (ref 6–8.3)
PROT SERPL-MCNC: 6 G/DL — SIGNIFICANT CHANGE UP (ref 6–8.3)
PROT SERPL-MCNC: 6.3 G/DL — SIGNIFICANT CHANGE UP (ref 6–8.3)
PROT SERPL-MCNC: 6.6 G/DL — SIGNIFICANT CHANGE UP (ref 6–8.3)
PROT SERPL-MCNC: 6.9 G/DL — SIGNIFICANT CHANGE UP (ref 6–8.3)
PROT SERPL-MCNC: 7.5 G/DL — SIGNIFICANT CHANGE UP (ref 6–8.3)
PROT SERPL-MCNC: 8.1 G/DL — SIGNIFICANT CHANGE UP (ref 6–8.3)
PROT SERPL-MCNC: 8.3 G/DL — SIGNIFICANT CHANGE UP (ref 6–8.3)
PROT UR-MCNC: 100 MG/DL
PROTHROM AB SERPL-ACNC: 11.2 SEC — SIGNIFICANT CHANGE UP (ref 10.5–13.4)
PROTHROM AB SERPL-ACNC: 12.5 SEC — SIGNIFICANT CHANGE UP (ref 10.5–13.4)
PROTHROM AB SERPL-ACNC: 13.8 SEC — HIGH (ref 10.5–13.4)
RAPID RVP RESULT: SIGNIFICANT CHANGE UP
RBC # BLD: 3.25 M/UL — LOW (ref 4.2–5.8)
RBC # BLD: 3.35 M/UL — LOW (ref 4.2–5.8)
RBC # BLD: 3.38 M/UL — LOW (ref 4.2–5.8)
RBC # BLD: 3.43 M/UL — LOW (ref 4.2–5.8)
RBC # BLD: 3.48 M/UL — LOW (ref 4.2–5.8)
RBC # BLD: 3.56 M/UL — LOW (ref 4.2–5.8)
RBC # BLD: 3.6 M/UL — LOW (ref 4.2–5.8)
RBC # BLD: 3.65 M/UL — LOW (ref 4.2–5.8)
RBC # BLD: 3.75 M/UL — LOW (ref 4.2–5.8)
RBC # BLD: 3.75 M/UL — LOW (ref 4.2–5.8)
RBC # BLD: 3.79 M/UL — LOW (ref 4.2–5.8)
RBC # BLD: 3.87 M/UL — LOW (ref 4.2–5.8)
RBC # BLD: 4.65 M/UL — SIGNIFICANT CHANGE UP (ref 4.2–5.8)
RBC # FLD: 14.3 % — SIGNIFICANT CHANGE UP (ref 10.3–14.5)
RBC # FLD: 14.6 % — HIGH (ref 10.3–14.5)
RBC # FLD: 15.3 % — HIGH (ref 10.3–14.5)
RBC # FLD: 15.9 % — HIGH (ref 10.3–14.5)
RBC # FLD: 16 % — HIGH (ref 10.3–14.5)
RBC # FLD: 16.1 % — HIGH (ref 10.3–14.5)
RBC # FLD: 16.1 % — HIGH (ref 10.3–14.5)
RBC # FLD: 16.3 % — HIGH (ref 10.3–14.5)
RBC # FLD: 16.4 % — HIGH (ref 10.3–14.5)
RBC CASTS # UR COMP ASSIST: ABNORMAL /HPF (ref 0–4)
SAO2 % BLDA: 95.3 % — SIGNIFICANT CHANGE UP (ref 94–98)
SAO2 % BLDA: 96.1 % — SIGNIFICANT CHANGE UP (ref 94–98)
SAO2 % BLDA: 97.3 % — SIGNIFICANT CHANGE UP (ref 94–98)
SAO2 % BLDA: 99.8 % — HIGH (ref 94–98)
SARS-COV-2 RNA SPEC QL NAA+PROBE: SIGNIFICANT CHANGE UP
SODIUM SERPL-SCNC: 130 MMOL/L — LOW (ref 135–145)
SODIUM SERPL-SCNC: 132 MMOL/L — LOW (ref 135–145)
SODIUM SERPL-SCNC: 133 MMOL/L — LOW (ref 135–145)
SODIUM SERPL-SCNC: 134 MMOL/L — LOW (ref 135–145)
SODIUM SERPL-SCNC: 136 MMOL/L — SIGNIFICANT CHANGE UP (ref 135–145)
SODIUM SERPL-SCNC: 136 MMOL/L — SIGNIFICANT CHANGE UP (ref 135–145)
SODIUM SERPL-SCNC: 139 MMOL/L — SIGNIFICANT CHANGE UP (ref 135–145)
SODIUM SERPL-SCNC: 141 MMOL/L — SIGNIFICANT CHANGE UP (ref 135–145)
SODIUM SERPL-SCNC: 142 MMOL/L — SIGNIFICANT CHANGE UP (ref 135–145)
SODIUM SERPL-SCNC: 144 MMOL/L — SIGNIFICANT CHANGE UP (ref 135–145)
SP GR SPEC: 1.01 — SIGNIFICANT CHANGE UP (ref 1.01–1.02)
SPECIMEN SOURCE: SIGNIFICANT CHANGE UP
TRIGL SERPL-MCNC: 133 MG/DL — SIGNIFICANT CHANGE UP
UROBILINOGEN FLD QL: NEGATIVE MG/DL — SIGNIFICANT CHANGE UP
WBC # BLD: 11.05 K/UL — HIGH (ref 3.8–10.5)
WBC # BLD: 11.43 K/UL — HIGH (ref 3.8–10.5)
WBC # BLD: 11.8 K/UL — HIGH (ref 3.8–10.5)
WBC # BLD: 11.89 K/UL — HIGH (ref 3.8–10.5)
WBC # BLD: 11.92 K/UL — HIGH (ref 3.8–10.5)
WBC # BLD: 12.21 K/UL — HIGH (ref 3.8–10.5)
WBC # BLD: 13.61 K/UL — HIGH (ref 3.8–10.5)
WBC # BLD: 14.54 K/UL — HIGH (ref 3.8–10.5)
WBC # BLD: 14.82 K/UL — HIGH (ref 3.8–10.5)
WBC # BLD: 14.94 K/UL — HIGH (ref 3.8–10.5)
WBC # BLD: 16.61 K/UL — HIGH (ref 3.8–10.5)
WBC # BLD: 19.58 K/UL — HIGH (ref 3.8–10.5)
WBC # BLD: 9.83 K/UL — SIGNIFICANT CHANGE UP (ref 3.8–10.5)
WBC # FLD AUTO: 11.05 K/UL — HIGH (ref 3.8–10.5)
WBC # FLD AUTO: 11.43 K/UL — HIGH (ref 3.8–10.5)
WBC # FLD AUTO: 11.8 K/UL — HIGH (ref 3.8–10.5)
WBC # FLD AUTO: 11.89 K/UL — HIGH (ref 3.8–10.5)
WBC # FLD AUTO: 11.92 K/UL — HIGH (ref 3.8–10.5)
WBC # FLD AUTO: 12.21 K/UL — HIGH (ref 3.8–10.5)
WBC # FLD AUTO: 13.61 K/UL — HIGH (ref 3.8–10.5)
WBC # FLD AUTO: 14.54 K/UL — HIGH (ref 3.8–10.5)
WBC # FLD AUTO: 14.82 K/UL — HIGH (ref 3.8–10.5)
WBC # FLD AUTO: 14.94 K/UL — HIGH (ref 3.8–10.5)
WBC # FLD AUTO: 16.61 K/UL — HIGH (ref 3.8–10.5)
WBC # FLD AUTO: 19.58 K/UL — HIGH (ref 3.8–10.5)
WBC # FLD AUTO: 9.83 K/UL — SIGNIFICANT CHANGE UP (ref 3.8–10.5)
WBC UR QL: SIGNIFICANT CHANGE UP

## 2023-01-01 PROCEDURE — 84145 PROCALCITONIN (PCT): CPT

## 2023-01-01 PROCEDURE — 71045 X-RAY EXAM CHEST 1 VIEW: CPT | Mod: 26

## 2023-01-01 PROCEDURE — 87186 SC STD MICRODIL/AGAR DIL: CPT

## 2023-01-01 PROCEDURE — 87637 SARSCOV2&INF A&B&RSV AMP PRB: CPT

## 2023-01-01 PROCEDURE — 71250 CT THORAX DX C-: CPT

## 2023-01-01 PROCEDURE — 99233 SBSQ HOSP IP/OBS HIGH 50: CPT

## 2023-01-01 PROCEDURE — 80307 DRUG TEST PRSMV CHEM ANLYZR: CPT

## 2023-01-01 PROCEDURE — 71045 X-RAY EXAM CHEST 1 VIEW: CPT | Mod: 26,76

## 2023-01-01 PROCEDURE — 85610 PROTHROMBIN TIME: CPT

## 2023-01-01 PROCEDURE — 85025 COMPLETE CBC W/AUTO DIFF WBC: CPT

## 2023-01-01 PROCEDURE — 80061 LIPID PANEL: CPT

## 2023-01-01 PROCEDURE — 36600 WITHDRAWAL OF ARTERIAL BLOOD: CPT

## 2023-01-01 PROCEDURE — 94799 UNLISTED PULMONARY SVC/PX: CPT

## 2023-01-01 PROCEDURE — 87077 CULTURE AEROBIC IDENTIFY: CPT

## 2023-01-01 PROCEDURE — 81001 URINALYSIS AUTO W/SCOPE: CPT

## 2023-01-01 PROCEDURE — 83880 ASSAY OF NATRIURETIC PEPTIDE: CPT

## 2023-01-01 PROCEDURE — 85027 COMPLETE CBC AUTOMATED: CPT

## 2023-01-01 PROCEDURE — 87070 CULTURE OTHR SPECIMN AEROBIC: CPT

## 2023-01-01 PROCEDURE — 80053 COMPREHEN METABOLIC PANEL: CPT

## 2023-01-01 PROCEDURE — 0225U NFCT DS DNA&RNA 21 SARSCOV2: CPT

## 2023-01-01 PROCEDURE — 94667 MNPJ CHEST WALL 1ST: CPT

## 2023-01-01 PROCEDURE — 36415 COLL VENOUS BLD VENIPUNCTURE: CPT

## 2023-01-01 PROCEDURE — 96375 TX/PRO/DX INJ NEW DRUG ADDON: CPT

## 2023-01-01 PROCEDURE — 87040 BLOOD CULTURE FOR BACTERIA: CPT

## 2023-01-01 PROCEDURE — 96374 THER/PROPH/DIAG INJ IV PUSH: CPT

## 2023-01-01 PROCEDURE — 82962 GLUCOSE BLOOD TEST: CPT

## 2023-01-01 PROCEDURE — 83690 ASSAY OF LIPASE: CPT

## 2023-01-01 PROCEDURE — 99239 HOSP IP/OBS DSCHRG MGMT >30: CPT

## 2023-01-01 PROCEDURE — 93970 EXTREMITY STUDY: CPT | Mod: 26

## 2023-01-01 PROCEDURE — 84100 ASSAY OF PHOSPHORUS: CPT

## 2023-01-01 PROCEDURE — 71045 X-RAY EXAM CHEST 1 VIEW: CPT | Mod: 26,77,76

## 2023-01-01 PROCEDURE — 86850 RBC ANTIBODY SCREEN: CPT

## 2023-01-01 PROCEDURE — 99232 SBSQ HOSP IP/OBS MODERATE 35: CPT

## 2023-01-01 PROCEDURE — 87086 URINE CULTURE/COLONY COUNT: CPT

## 2023-01-01 PROCEDURE — 82140 ASSAY OF AMMONIA: CPT

## 2023-01-01 PROCEDURE — 83735 ASSAY OF MAGNESIUM: CPT

## 2023-01-01 PROCEDURE — 93970 EXTREMITY STUDY: CPT

## 2023-01-01 PROCEDURE — 94003 VENT MGMT INPAT SUBQ DAY: CPT

## 2023-01-01 PROCEDURE — 82803 BLOOD GASES ANY COMBINATION: CPT

## 2023-01-01 PROCEDURE — 93005 ELECTROCARDIOGRAM TRACING: CPT

## 2023-01-01 PROCEDURE — 87640 STAPH A DNA AMP PROBE: CPT

## 2023-01-01 PROCEDURE — 99291 CRITICAL CARE FIRST HOUR: CPT

## 2023-01-01 PROCEDURE — 80048 BASIC METABOLIC PNL TOTAL CA: CPT

## 2023-01-01 PROCEDURE — 86900 BLOOD TYPING SEROLOGIC ABO: CPT

## 2023-01-01 PROCEDURE — 94760 N-INVAS EAR/PLS OXIMETRY 1: CPT

## 2023-01-01 PROCEDURE — 80074 ACUTE HEPATITIS PANEL: CPT

## 2023-01-01 PROCEDURE — 94640 AIRWAY INHALATION TREATMENT: CPT

## 2023-01-01 PROCEDURE — 87641 MR-STAPH DNA AMP PROBE: CPT

## 2023-01-01 PROCEDURE — C1729: CPT

## 2023-01-01 PROCEDURE — 82550 ASSAY OF CK (CPK): CPT

## 2023-01-01 PROCEDURE — 86901 BLOOD TYPING SEROLOGIC RH(D): CPT

## 2023-01-01 PROCEDURE — 82248 BILIRUBIN DIRECT: CPT

## 2023-01-01 PROCEDURE — 94002 VENT MGMT INPAT INIT DAY: CPT

## 2023-01-01 PROCEDURE — 71045 X-RAY EXAM CHEST 1 VIEW: CPT

## 2023-01-01 PROCEDURE — 70450 CT HEAD/BRAIN W/O DYE: CPT

## 2023-01-01 PROCEDURE — 83036 HEMOGLOBIN GLYCOSYLATED A1C: CPT

## 2023-01-01 PROCEDURE — 32551 INSERTION OF CHEST TUBE: CPT

## 2023-01-01 PROCEDURE — 84484 ASSAY OF TROPONIN QUANT: CPT

## 2023-01-01 PROCEDURE — 82553 CREATINE MB FRACTION: CPT

## 2023-01-01 PROCEDURE — 94668 MNPJ CHEST WALL SBSQ: CPT

## 2023-01-01 PROCEDURE — 85730 THROMBOPLASTIN TIME PARTIAL: CPT

## 2023-01-01 PROCEDURE — 74176 CT ABD & PELVIS W/O CONTRAST: CPT

## 2023-01-01 PROCEDURE — 83605 ASSAY OF LACTIC ACID: CPT

## 2023-01-01 RX ORDER — CEFEPIME 1 G/1
1000 INJECTION, POWDER, FOR SOLUTION INTRAMUSCULAR; INTRAVENOUS ONCE
Refills: 0 | Status: COMPLETED | OUTPATIENT
Start: 2023-01-01 | End: 2023-01-01

## 2023-01-01 RX ORDER — HEPARIN SODIUM 5000 [USP'U]/ML
5000 INJECTION INTRAVENOUS; SUBCUTANEOUS EVERY 8 HOURS
Refills: 0 | Status: DISCONTINUED | OUTPATIENT
Start: 2023-01-01 | End: 2023-01-01

## 2023-01-01 RX ORDER — SODIUM CHLORIDE 9 MG/ML
1000 INJECTION INTRAMUSCULAR; INTRAVENOUS; SUBCUTANEOUS
Refills: 0 | Status: DISCONTINUED | OUTPATIENT
Start: 2023-01-01 | End: 2023-01-01

## 2023-01-01 RX ORDER — MIDODRINE HYDROCHLORIDE 2.5 MG/1
10 TABLET ORAL EVERY 8 HOURS
Refills: 0 | Status: DISCONTINUED | OUTPATIENT
Start: 2023-01-01 | End: 2023-01-01

## 2023-01-01 RX ORDER — DOBUTAMINE HCL 250MG/20ML
2.5 VIAL (ML) INTRAVENOUS
Qty: 500 | Refills: 0 | Status: DISCONTINUED | OUTPATIENT
Start: 2023-01-01 | End: 2023-01-01

## 2023-01-01 RX ORDER — PROPOFOL 10 MG/ML
10 INJECTION, EMULSION INTRAVENOUS
Qty: 1000 | Refills: 0 | Status: DISCONTINUED | OUTPATIENT
Start: 2023-01-01 | End: 2023-01-01

## 2023-01-01 RX ORDER — CEFEPIME 1 G/1
INJECTION, POWDER, FOR SOLUTION INTRAMUSCULAR; INTRAVENOUS
Refills: 0 | Status: DISCONTINUED | OUTPATIENT
Start: 2023-01-01 | End: 2023-01-01

## 2023-01-01 RX ORDER — SODIUM CHLORIDE 9 MG/ML
2000 INJECTION INTRAMUSCULAR; INTRAVENOUS; SUBCUTANEOUS ONCE
Refills: 0 | Status: COMPLETED | OUTPATIENT
Start: 2023-01-01 | End: 2023-01-01

## 2023-01-01 RX ORDER — MEROPENEM 1 G/30ML
500 INJECTION INTRAVENOUS EVERY 12 HOURS
Refills: 0 | Status: COMPLETED | OUTPATIENT
Start: 2023-01-01 | End: 2023-01-01

## 2023-01-01 RX ORDER — MIDAZOLAM HYDROCHLORIDE 1 MG/ML
2 INJECTION, SOLUTION INTRAMUSCULAR; INTRAVENOUS ONCE
Refills: 0 | Status: DISCONTINUED | OUTPATIENT
Start: 2023-01-01 | End: 2023-01-01

## 2023-01-01 RX ORDER — CEFEPIME 1 G/1
1000 INJECTION, POWDER, FOR SOLUTION INTRAMUSCULAR; INTRAVENOUS EVERY 24 HOURS
Refills: 0 | Status: DISCONTINUED | OUTPATIENT
Start: 2023-01-01 | End: 2023-01-01

## 2023-01-01 RX ORDER — PROPOFOL 10 MG/ML
15 INJECTION, EMULSION INTRAVENOUS
Qty: 1000 | Refills: 0 | Status: DISCONTINUED | OUTPATIENT
Start: 2023-01-01 | End: 2023-01-01

## 2023-01-01 RX ORDER — FENTANYL CITRATE 50 UG/ML
50 INJECTION INTRAVENOUS ONCE
Refills: 0 | Status: DISCONTINUED | OUTPATIENT
Start: 2023-01-01 | End: 2023-01-01

## 2023-01-01 RX ORDER — ACETAMINOPHEN 500 MG
650 TABLET ORAL EVERY 6 HOURS
Refills: 0 | Status: DISCONTINUED | OUTPATIENT
Start: 2023-01-01 | End: 2023-01-01

## 2023-01-01 RX ORDER — DESMOPRESSIN ACETATE 0.1 MG/1
24 TABLET ORAL ONCE
Refills: 0 | Status: COMPLETED | OUTPATIENT
Start: 2023-01-01 | End: 2023-01-01

## 2023-01-01 RX ORDER — ROBINUL 0.2 MG/ML
0.2 INJECTION INTRAMUSCULAR; INTRAVENOUS EVERY 6 HOURS
Refills: 0 | Status: DISCONTINUED | OUTPATIENT
Start: 2023-01-01 | End: 2023-01-01

## 2023-01-01 RX ORDER — FENTANYL CITRATE 50 UG/ML
25 INJECTION INTRAVENOUS
Refills: 0 | Status: DISCONTINUED | OUTPATIENT
Start: 2023-01-01 | End: 2023-01-01

## 2023-01-01 RX ORDER — DOBUTAMINE HCL 250MG/20ML
5 VIAL (ML) INTRAVENOUS
Qty: 500 | Refills: 0 | Status: DISCONTINUED | OUTPATIENT
Start: 2023-01-01 | End: 2023-01-01

## 2023-01-01 RX ORDER — NOREPINEPHRINE BITARTRATE/D5W 8 MG/250ML
0.05 PLASTIC BAG, INJECTION (ML) INTRAVENOUS
Qty: 8 | Refills: 0 | Status: DISCONTINUED | OUTPATIENT
Start: 2023-01-01 | End: 2023-01-01

## 2023-01-01 RX ORDER — MIDODRINE HYDROCHLORIDE 2.5 MG/1
20 TABLET ORAL ONCE
Refills: 0 | Status: COMPLETED | OUTPATIENT
Start: 2023-01-01 | End: 2023-01-01

## 2023-01-01 RX ORDER — NYSTATIN CREAM 100000 [USP'U]/G
1 CREAM TOPICAL
Refills: 0 | Status: DISCONTINUED | OUTPATIENT
Start: 2023-01-01 | End: 2023-01-01

## 2023-01-01 RX ORDER — VANCOMYCIN HCL 1 G
1500 VIAL (EA) INTRAVENOUS ONCE
Refills: 0 | Status: COMPLETED | OUTPATIENT
Start: 2023-01-01 | End: 2023-01-01

## 2023-01-01 RX ORDER — ROBINUL 0.2 MG/ML
0.1 INJECTION INTRAMUSCULAR; INTRAVENOUS ONCE
Refills: 0 | Status: COMPLETED | OUTPATIENT
Start: 2023-01-01 | End: 2023-01-01

## 2023-01-01 RX ORDER — ROBINUL 0.2 MG/ML
0.1 INJECTION INTRAMUSCULAR; INTRAVENOUS EVERY 6 HOURS
Refills: 0 | Status: DISCONTINUED | OUTPATIENT
Start: 2023-01-01 | End: 2023-01-01

## 2023-01-01 RX ORDER — MIDAZOLAM HYDROCHLORIDE 1 MG/ML
2 INJECTION, SOLUTION INTRAMUSCULAR; INTRAVENOUS
Refills: 0 | Status: DISCONTINUED | OUTPATIENT
Start: 2023-01-01 | End: 2023-01-01

## 2023-01-01 RX ADMIN — NYSTATIN CREAM 1 APPLICATION(S): 100000 CREAM TOPICAL at 05:01

## 2023-01-01 RX ADMIN — LACTULOSE 20 GRAM(S): 10 SOLUTION ORAL at 06:11

## 2023-01-01 RX ADMIN — MEROPENEM 100 MILLIGRAM(S): 1 INJECTION INTRAVENOUS at 17:44

## 2023-01-01 RX ADMIN — ROBINUL 0.2 MILLIGRAM(S): 0.2 INJECTION INTRAMUSCULAR; INTRAVENOUS at 03:52

## 2023-01-01 RX ADMIN — AMIODARONE HYDROCHLORIDE 200 MILLIGRAM(S): 400 TABLET ORAL at 05:14

## 2023-01-01 RX ADMIN — MEROPENEM 100 MILLIGRAM(S): 1 INJECTION INTRAVENOUS at 05:14

## 2023-01-01 RX ADMIN — HEPARIN SODIUM 5000 UNIT(S): 5000 INJECTION INTRAVENOUS; SUBCUTANEOUS at 14:00

## 2023-01-01 RX ADMIN — ALBUTEROL 2 PUFF(S): 90 AEROSOL, METERED ORAL at 00:45

## 2023-01-01 RX ADMIN — PANTOPRAZOLE SODIUM 40 MILLIGRAM(S): 20 TABLET, DELAYED RELEASE ORAL at 11:48

## 2023-01-01 RX ADMIN — CHLORHEXIDINE GLUCONATE 15 MILLILITER(S): 213 SOLUTION TOPICAL at 17:38

## 2023-01-01 RX ADMIN — AMIODARONE HYDROCHLORIDE 200 MILLIGRAM(S): 400 TABLET ORAL at 05:29

## 2023-01-01 RX ADMIN — ALBUTEROL 2 PUFF(S): 90 AEROSOL, METERED ORAL at 15:40

## 2023-01-01 RX ADMIN — Medication 81 MILLIGRAM(S): at 11:13

## 2023-01-01 RX ADMIN — PROPOFOL 4.9 MICROGRAM(S)/KG/MIN: 10 INJECTION, EMULSION INTRAVENOUS at 16:20

## 2023-01-01 RX ADMIN — PROPOFOL 4.9 MICROGRAM(S)/KG/MIN: 10 INJECTION, EMULSION INTRAVENOUS at 14:17

## 2023-01-01 RX ADMIN — Medication 650 MILLIGRAM(S): at 19:02

## 2023-01-01 RX ADMIN — Medication 1 PUFF(S): at 07:38

## 2023-01-01 RX ADMIN — Medication 1 PUFF(S): at 01:14

## 2023-01-01 RX ADMIN — Medication 1 PUFF(S): at 14:24

## 2023-01-01 RX ADMIN — CHLORHEXIDINE GLUCONATE 15 MILLILITER(S): 213 SOLUTION TOPICAL at 05:15

## 2023-01-01 RX ADMIN — PIPERACILLIN AND TAZOBACTAM 25 GRAM(S): 4; .5 INJECTION, POWDER, LYOPHILIZED, FOR SOLUTION INTRAVENOUS at 13:19

## 2023-01-01 RX ADMIN — ALBUTEROL 2 PUFF(S): 90 AEROSOL, METERED ORAL at 19:42

## 2023-01-01 RX ADMIN — HEPARIN SODIUM 5000 UNIT(S): 5000 INJECTION INTRAVENOUS; SUBCUTANEOUS at 05:16

## 2023-01-01 RX ADMIN — PROPOFOL 4.9 MICROGRAM(S)/KG/MIN: 10 INJECTION, EMULSION INTRAVENOUS at 06:07

## 2023-01-01 RX ADMIN — Medication 81 MILLIGRAM(S): at 11:48

## 2023-01-01 RX ADMIN — Medication 81 MILLIGRAM(S): at 11:25

## 2023-01-01 RX ADMIN — ALBUTEROL 2 PUFF(S): 90 AEROSOL, METERED ORAL at 13:33

## 2023-01-01 RX ADMIN — DESMOPRESSIN ACETATE 224 MICROGRAM(S): 0.1 TABLET ORAL at 19:56

## 2023-01-01 RX ADMIN — Medication 12.2 MICROGRAM(S)/KG/MIN: at 15:22

## 2023-01-01 RX ADMIN — ALBUTEROL 2 PUFF(S): 90 AEROSOL, METERED ORAL at 01:14

## 2023-01-01 RX ADMIN — CHLORHEXIDINE GLUCONATE 15 MILLILITER(S): 213 SOLUTION TOPICAL at 17:36

## 2023-01-01 RX ADMIN — Medication 1 PUFF(S): at 00:31

## 2023-01-01 RX ADMIN — HEPARIN SODIUM 5000 UNIT(S): 5000 INJECTION INTRAVENOUS; SUBCUTANEOUS at 18:20

## 2023-01-01 RX ADMIN — Medication 1 PUFF(S): at 20:22

## 2023-01-01 RX ADMIN — PANTOPRAZOLE SODIUM 40 MILLIGRAM(S): 20 TABLET, DELAYED RELEASE ORAL at 11:38

## 2023-01-01 RX ADMIN — Medication 12.2 MICROGRAM(S)/KG/MIN: at 22:51

## 2023-01-01 RX ADMIN — ALBUTEROL 2 PUFF(S): 90 AEROSOL, METERED ORAL at 06:39

## 2023-01-01 RX ADMIN — HEPARIN SODIUM 5000 UNIT(S): 5000 INJECTION INTRAVENOUS; SUBCUTANEOUS at 15:20

## 2023-01-01 RX ADMIN — ALBUTEROL 2 PUFF(S): 90 AEROSOL, METERED ORAL at 07:20

## 2023-01-01 RX ADMIN — HEPARIN SODIUM 5000 UNIT(S): 5000 INJECTION INTRAVENOUS; SUBCUTANEOUS at 15:33

## 2023-01-01 RX ADMIN — HEPARIN SODIUM 5000 UNIT(S): 5000 INJECTION INTRAVENOUS; SUBCUTANEOUS at 21:33

## 2023-01-01 RX ADMIN — PANTOPRAZOLE SODIUM 40 MILLIGRAM(S): 20 TABLET, DELAYED RELEASE ORAL at 11:09

## 2023-01-01 RX ADMIN — PANTOPRAZOLE SODIUM 40 MILLIGRAM(S): 20 TABLET, DELAYED RELEASE ORAL at 11:23

## 2023-01-01 RX ADMIN — FENTANYL CITRATE 25 MICROGRAM(S): 50 INJECTION INTRAVENOUS at 21:19

## 2023-01-01 RX ADMIN — HEPARIN SODIUM 5000 UNIT(S): 5000 INJECTION INTRAVENOUS; SUBCUTANEOUS at 13:55

## 2023-01-01 RX ADMIN — PROPOFOL 4.9 MICROGRAM(S)/KG/MIN: 10 INJECTION, EMULSION INTRAVENOUS at 12:32

## 2023-01-01 RX ADMIN — Medication 1 PUFF(S): at 20:12

## 2023-01-01 RX ADMIN — NYSTATIN CREAM 1 APPLICATION(S): 100000 CREAM TOPICAL at 05:18

## 2023-01-01 RX ADMIN — Medication 1 PUFF(S): at 19:38

## 2023-01-01 RX ADMIN — HEPARIN SODIUM 5000 UNIT(S): 5000 INJECTION INTRAVENOUS; SUBCUTANEOUS at 15:42

## 2023-01-01 RX ADMIN — PANTOPRAZOLE SODIUM 40 MILLIGRAM(S): 20 TABLET, DELAYED RELEASE ORAL at 11:25

## 2023-01-01 RX ADMIN — HEPARIN SODIUM 5000 UNIT(S): 5000 INJECTION INTRAVENOUS; SUBCUTANEOUS at 17:06

## 2023-01-01 RX ADMIN — Medication 1 PUFF(S): at 07:35

## 2023-01-01 RX ADMIN — MEROPENEM 100 MILLIGRAM(S): 1 INJECTION INTRAVENOUS at 05:15

## 2023-01-01 RX ADMIN — PIPERACILLIN AND TAZOBACTAM 25 GRAM(S): 4; .5 INJECTION, POWDER, LYOPHILIZED, FOR SOLUTION INTRAVENOUS at 00:37

## 2023-01-01 RX ADMIN — Medication 1 PUFF(S): at 06:46

## 2023-01-01 RX ADMIN — PROPOFOL 4.9 MICROGRAM(S)/KG/MIN: 10 INJECTION, EMULSION INTRAVENOUS at 04:35

## 2023-01-01 RX ADMIN — NYSTATIN CREAM 1 APPLICATION(S): 100000 CREAM TOPICAL at 17:16

## 2023-01-01 RX ADMIN — ALBUTEROL 2 PUFF(S): 90 AEROSOL, METERED ORAL at 20:17

## 2023-01-01 RX ADMIN — ALBUTEROL 2 PUFF(S): 90 AEROSOL, METERED ORAL at 08:19

## 2023-01-01 RX ADMIN — CHLORHEXIDINE GLUCONATE 15 MILLILITER(S): 213 SOLUTION TOPICAL at 17:27

## 2023-01-01 RX ADMIN — ALBUTEROL 2 PUFF(S): 90 AEROSOL, METERED ORAL at 20:23

## 2023-01-01 RX ADMIN — ALBUTEROL 2 PUFF(S): 90 AEROSOL, METERED ORAL at 13:49

## 2023-01-01 RX ADMIN — HEPARIN SODIUM 5000 UNIT(S): 5000 INJECTION INTRAVENOUS; SUBCUTANEOUS at 05:29

## 2023-01-01 RX ADMIN — Medication 1 PUFF(S): at 01:09

## 2023-01-01 RX ADMIN — DEXMEDETOMIDINE HYDROCHLORIDE IN 0.9% SODIUM CHLORIDE 4.08 MICROGRAM(S)/KG/HR: 4 INJECTION INTRAVENOUS at 11:55

## 2023-01-01 RX ADMIN — CHLORHEXIDINE GLUCONATE 15 MILLILITER(S): 213 SOLUTION TOPICAL at 18:01

## 2023-01-01 RX ADMIN — FENTANYL CITRATE 25 MICROGRAM(S): 50 INJECTION INTRAVENOUS at 21:32

## 2023-01-01 RX ADMIN — NYSTATIN CREAM 1 APPLICATION(S): 100000 CREAM TOPICAL at 18:14

## 2023-01-01 RX ADMIN — Medication 1 PUFF(S): at 20:17

## 2023-01-01 RX ADMIN — CHLORHEXIDINE GLUCONATE 15 MILLILITER(S): 213 SOLUTION TOPICAL at 05:18

## 2023-01-01 RX ADMIN — Medication 12.2 MICROGRAM(S)/KG/MIN: at 12:54

## 2023-01-01 RX ADMIN — CHLORHEXIDINE GLUCONATE 15 MILLILITER(S): 213 SOLUTION TOPICAL at 05:33

## 2023-01-01 RX ADMIN — ALBUTEROL 2 PUFF(S): 90 AEROSOL, METERED ORAL at 20:22

## 2023-01-01 RX ADMIN — CHLORHEXIDINE GLUCONATE 15 MILLILITER(S): 213 SOLUTION TOPICAL at 05:04

## 2023-01-01 RX ADMIN — PANTOPRAZOLE SODIUM 40 MILLIGRAM(S): 20 TABLET, DELAYED RELEASE ORAL at 11:13

## 2023-01-01 RX ADMIN — NYSTATIN CREAM 1 APPLICATION(S): 100000 CREAM TOPICAL at 06:16

## 2023-01-01 RX ADMIN — Medication 1: at 17:44

## 2023-01-01 RX ADMIN — SODIUM CHLORIDE 75 MILLILITER(S): 9 INJECTION, SOLUTION INTRAVENOUS at 18:20

## 2023-01-01 RX ADMIN — Medication 81 MILLIGRAM(S): at 11:32

## 2023-01-01 RX ADMIN — CHLORHEXIDINE GLUCONATE 15 MILLILITER(S): 213 SOLUTION TOPICAL at 17:34

## 2023-01-01 RX ADMIN — AMIODARONE HYDROCHLORIDE 200 MILLIGRAM(S): 400 TABLET ORAL at 05:18

## 2023-01-01 RX ADMIN — Medication 81 MILLIGRAM(S): at 11:59

## 2023-01-01 RX ADMIN — CHLORHEXIDINE GLUCONATE 15 MILLILITER(S): 213 SOLUTION TOPICAL at 06:13

## 2023-01-01 RX ADMIN — PROPOFOL 4.9 MICROGRAM(S)/KG/MIN: 10 INJECTION, EMULSION INTRAVENOUS at 01:14

## 2023-01-01 RX ADMIN — PROPOFOL 4.9 MICROGRAM(S)/KG/MIN: 10 INJECTION, EMULSION INTRAVENOUS at 19:32

## 2023-01-01 RX ADMIN — Medication 1 PUFF(S): at 13:13

## 2023-01-01 RX ADMIN — PROPOFOL 4.9 MICROGRAM(S)/KG/MIN: 10 INJECTION, EMULSION INTRAVENOUS at 22:22

## 2023-01-01 RX ADMIN — Medication 1 PUFF(S): at 01:32

## 2023-01-01 RX ADMIN — HEPARIN SODIUM 5000 UNIT(S): 5000 INJECTION INTRAVENOUS; SUBCUTANEOUS at 05:51

## 2023-01-01 RX ADMIN — Medication 1 PUFF(S): at 15:08

## 2023-01-01 RX ADMIN — Medication 650 MILLIGRAM(S): at 06:46

## 2023-01-01 RX ADMIN — Medication 1 PUFF(S): at 13:49

## 2023-01-01 RX ADMIN — CHLORHEXIDINE GLUCONATE 15 MILLILITER(S): 213 SOLUTION TOPICAL at 06:43

## 2023-01-01 RX ADMIN — FENTANYL CITRATE 25 MICROGRAM(S): 50 INJECTION INTRAVENOUS at 21:16

## 2023-01-01 RX ADMIN — CHLORHEXIDINE GLUCONATE 15 MILLILITER(S): 213 SOLUTION TOPICAL at 17:16

## 2023-01-01 RX ADMIN — PROPOFOL 4.9 MICROGRAM(S)/KG/MIN: 10 INJECTION, EMULSION INTRAVENOUS at 20:13

## 2023-01-01 RX ADMIN — FENTANYL CITRATE 25 MICROGRAM(S): 50 INJECTION INTRAVENOUS at 17:06

## 2023-01-01 RX ADMIN — Medication 1 PUFF(S): at 20:23

## 2023-01-01 RX ADMIN — MIDODRINE HYDROCHLORIDE 20 MILLIGRAM(S): 2.5 TABLET ORAL at 12:02

## 2023-01-01 RX ADMIN — Medication 1 PUFF(S): at 01:52

## 2023-01-01 RX ADMIN — MEROPENEM 100 MILLIGRAM(S): 1 INJECTION INTRAVENOUS at 18:01

## 2023-01-01 RX ADMIN — PANTOPRAZOLE SODIUM 40 MILLIGRAM(S): 20 TABLET, DELAYED RELEASE ORAL at 11:32

## 2023-01-01 RX ADMIN — ALBUTEROL 2 PUFF(S): 90 AEROSOL, METERED ORAL at 20:21

## 2023-01-01 RX ADMIN — ALBUTEROL 2 PUFF(S): 90 AEROSOL, METERED ORAL at 20:11

## 2023-01-01 RX ADMIN — DEXMEDETOMIDINE HYDROCHLORIDE IN 0.9% SODIUM CHLORIDE 4.08 MICROGRAM(S)/KG/HR: 4 INJECTION INTRAVENOUS at 10:55

## 2023-01-01 RX ADMIN — MEROPENEM 100 MILLIGRAM(S): 1 INJECTION INTRAVENOUS at 05:06

## 2023-01-01 RX ADMIN — HEPARIN SODIUM 5000 UNIT(S): 5000 INJECTION INTRAVENOUS; SUBCUTANEOUS at 13:42

## 2023-01-01 RX ADMIN — ALBUTEROL 2 PUFF(S): 90 AEROSOL, METERED ORAL at 20:39

## 2023-01-01 RX ADMIN — CHLORHEXIDINE GLUCONATE 15 MILLILITER(S): 213 SOLUTION TOPICAL at 18:41

## 2023-01-01 RX ADMIN — CHLORHEXIDINE GLUCONATE 15 MILLILITER(S): 213 SOLUTION TOPICAL at 05:23

## 2023-01-01 RX ADMIN — PROPOFOL 4.9 MICROGRAM(S)/KG/MIN: 10 INJECTION, EMULSION INTRAVENOUS at 21:26

## 2023-01-01 RX ADMIN — ALBUTEROL 2 PUFF(S): 90 AEROSOL, METERED ORAL at 20:34

## 2023-01-01 RX ADMIN — Medication 1 PUFF(S): at 00:45

## 2023-01-01 RX ADMIN — LACTULOSE 20 GRAM(S): 10 SOLUTION ORAL at 05:43

## 2023-01-01 RX ADMIN — ALBUTEROL 2 PUFF(S): 90 AEROSOL, METERED ORAL at 07:54

## 2023-01-01 RX ADMIN — ALBUTEROL 2 PUFF(S): 90 AEROSOL, METERED ORAL at 07:27

## 2023-01-01 RX ADMIN — HEPARIN SODIUM 5000 UNIT(S): 5000 INJECTION INTRAVENOUS; SUBCUTANEOUS at 22:07

## 2023-01-01 RX ADMIN — ALBUTEROL 2 PUFF(S): 90 AEROSOL, METERED ORAL at 07:28

## 2023-01-01 RX ADMIN — NYSTATIN CREAM 1 APPLICATION(S): 100000 CREAM TOPICAL at 05:52

## 2023-01-01 RX ADMIN — DEXMEDETOMIDINE HYDROCHLORIDE IN 0.9% SODIUM CHLORIDE 4.08 MICROGRAM(S)/KG/HR: 4 INJECTION INTRAVENOUS at 02:23

## 2023-01-01 RX ADMIN — CHLORHEXIDINE GLUCONATE 15 MILLILITER(S): 213 SOLUTION TOPICAL at 17:26

## 2023-01-01 RX ADMIN — HEPARIN SODIUM 5000 UNIT(S): 5000 INJECTION INTRAVENOUS; SUBCUTANEOUS at 22:00

## 2023-01-01 RX ADMIN — CHLORHEXIDINE GLUCONATE 15 MILLILITER(S): 213 SOLUTION TOPICAL at 05:29

## 2023-01-01 RX ADMIN — Medication 1 PUFF(S): at 13:45

## 2023-01-01 RX ADMIN — ALBUTEROL 2 PUFF(S): 90 AEROSOL, METERED ORAL at 00:35

## 2023-01-01 RX ADMIN — DEXMEDETOMIDINE HYDROCHLORIDE IN 0.9% SODIUM CHLORIDE 4.08 MICROGRAM(S)/KG/HR: 4 INJECTION INTRAVENOUS at 01:17

## 2023-01-01 RX ADMIN — MEROPENEM 100 MILLIGRAM(S): 1 INJECTION INTRAVENOUS at 17:34

## 2023-01-01 RX ADMIN — HEPARIN SODIUM 5000 UNIT(S): 5000 INJECTION INTRAVENOUS; SUBCUTANEOUS at 05:06

## 2023-01-01 RX ADMIN — PROPOFOL 4.9 MICROGRAM(S)/KG/MIN: 10 INJECTION, EMULSION INTRAVENOUS at 22:07

## 2023-01-01 RX ADMIN — HEPARIN SODIUM 5000 UNIT(S): 5000 INJECTION INTRAVENOUS; SUBCUTANEOUS at 05:25

## 2023-01-01 RX ADMIN — NYSTATIN CREAM 1 APPLICATION(S): 100000 CREAM TOPICAL at 06:26

## 2023-01-01 RX ADMIN — FENTANYL CITRATE 50 MICROGRAM(S): 50 INJECTION INTRAVENOUS at 00:15

## 2023-01-01 RX ADMIN — NYSTATIN CREAM 1 APPLICATION(S): 100000 CREAM TOPICAL at 17:44

## 2023-01-01 RX ADMIN — HEPARIN SODIUM 5000 UNIT(S): 5000 INJECTION INTRAVENOUS; SUBCUTANEOUS at 22:19

## 2023-01-01 RX ADMIN — PROPOFOL 4.9 MICROGRAM(S)/KG/MIN: 10 INJECTION, EMULSION INTRAVENOUS at 13:24

## 2023-01-01 RX ADMIN — ALBUTEROL 2 PUFF(S): 90 AEROSOL, METERED ORAL at 19:46

## 2023-01-01 RX ADMIN — MUPIROCIN 1 APPLICATION(S): 20 OINTMENT TOPICAL at 17:06

## 2023-01-01 RX ADMIN — Medication 1 PUFF(S): at 00:43

## 2023-01-01 RX ADMIN — ALBUTEROL 2 PUFF(S): 90 AEROSOL, METERED ORAL at 01:09

## 2023-01-01 RX ADMIN — NYSTATIN CREAM 1 APPLICATION(S): 100000 CREAM TOPICAL at 05:29

## 2023-01-01 RX ADMIN — ALBUTEROL 2 PUFF(S): 90 AEROSOL, METERED ORAL at 00:49

## 2023-01-01 RX ADMIN — ALBUTEROL 2 PUFF(S): 90 AEROSOL, METERED ORAL at 01:51

## 2023-01-01 RX ADMIN — Medication 12.2 MICROGRAM(S)/KG/MIN: at 09:25

## 2023-01-01 RX ADMIN — Medication 12.2 MICROGRAM(S)/KG/MIN: at 05:58

## 2023-01-01 RX ADMIN — FENTANYL CITRATE 25 MICROGRAM(S): 50 INJECTION INTRAVENOUS at 14:33

## 2023-01-01 RX ADMIN — ALBUTEROL 2 PUFF(S): 90 AEROSOL, METERED ORAL at 01:50

## 2023-01-01 RX ADMIN — Medication 81 MILLIGRAM(S): at 11:09

## 2023-01-01 RX ADMIN — MUPIROCIN 1 APPLICATION(S): 20 OINTMENT TOPICAL at 17:38

## 2023-01-01 RX ADMIN — AMIODARONE HYDROCHLORIDE 200 MILLIGRAM(S): 400 TABLET ORAL at 05:02

## 2023-01-01 RX ADMIN — PROPOFOL 4.9 MICROGRAM(S)/KG/MIN: 10 INJECTION, EMULSION INTRAVENOUS at 00:45

## 2023-01-01 RX ADMIN — Medication 1 PUFF(S): at 15:41

## 2023-01-01 RX ADMIN — Medication 1 PUFF(S): at 07:28

## 2023-01-01 RX ADMIN — MEROPENEM 100 MILLIGRAM(S): 1 INJECTION INTRAVENOUS at 17:12

## 2023-01-01 RX ADMIN — Medication 12.2 MICROGRAM(S)/KG/MIN: at 06:07

## 2023-01-01 RX ADMIN — NYSTATIN CREAM 1 APPLICATION(S): 100000 CREAM TOPICAL at 05:14

## 2023-01-01 RX ADMIN — Medication 1: at 06:31

## 2023-01-01 RX ADMIN — Medication 1 PUFF(S): at 13:39

## 2023-01-01 RX ADMIN — PANTOPRAZOLE SODIUM 40 MILLIGRAM(S): 20 TABLET, DELAYED RELEASE ORAL at 11:26

## 2023-01-01 RX ADMIN — HEPARIN SODIUM 5000 UNIT(S): 5000 INJECTION INTRAVENOUS; SUBCUTANEOUS at 05:01

## 2023-01-01 RX ADMIN — PROPOFOL 4.9 MICROGRAM(S)/KG/MIN: 10 INJECTION, EMULSION INTRAVENOUS at 06:46

## 2023-01-01 RX ADMIN — Medication 12.2 MICROGRAM(S)/KG/MIN: at 01:38

## 2023-01-01 RX ADMIN — ALBUTEROL 2 PUFF(S): 90 AEROSOL, METERED ORAL at 06:46

## 2023-01-01 RX ADMIN — Medication 1 PUFF(S): at 13:38

## 2023-01-01 RX ADMIN — Medication 1 PUFF(S): at 19:48

## 2023-01-01 RX ADMIN — Medication 1 PUFF(S): at 01:50

## 2023-01-01 RX ADMIN — ALBUTEROL 2 PUFF(S): 90 AEROSOL, METERED ORAL at 01:08

## 2023-01-01 RX ADMIN — ROBINUL 0.1 MILLIGRAM(S): 0.2 INJECTION INTRAMUSCULAR; INTRAVENOUS at 02:52

## 2023-01-01 RX ADMIN — ALBUTEROL 2 PUFF(S): 90 AEROSOL, METERED ORAL at 13:38

## 2023-01-01 RX ADMIN — AMIODARONE HYDROCHLORIDE 200 MILLIGRAM(S): 400 TABLET ORAL at 05:16

## 2023-01-01 RX ADMIN — Medication 1 PUFF(S): at 00:53

## 2023-01-01 RX ADMIN — AMIODARONE HYDROCHLORIDE 200 MILLIGRAM(S): 400 TABLET ORAL at 05:03

## 2023-01-01 RX ADMIN — ALBUTEROL 2 PUFF(S): 90 AEROSOL, METERED ORAL at 06:30

## 2023-01-01 RX ADMIN — MIDAZOLAM HYDROCHLORIDE 2 MILLIGRAM(S): 1 INJECTION, SOLUTION INTRAMUSCULAR; INTRAVENOUS at 01:01

## 2023-01-01 RX ADMIN — AMIODARONE HYDROCHLORIDE 200 MILLIGRAM(S): 400 TABLET ORAL at 05:05

## 2023-01-01 RX ADMIN — AMIODARONE HYDROCHLORIDE 200 MILLIGRAM(S): 400 TABLET ORAL at 05:04

## 2023-01-01 RX ADMIN — HEPARIN SODIUM 5000 UNIT(S): 5000 INJECTION INTRAVENOUS; SUBCUTANEOUS at 22:24

## 2023-01-01 RX ADMIN — AMIODARONE HYDROCHLORIDE 200 MILLIGRAM(S): 400 TABLET ORAL at 06:17

## 2023-01-01 RX ADMIN — Medication 81 MILLIGRAM(S): at 11:21

## 2023-01-01 RX ADMIN — HEPARIN SODIUM 5000 UNIT(S): 5000 INJECTION INTRAVENOUS; SUBCUTANEOUS at 05:15

## 2023-01-01 RX ADMIN — PROPOFOL 4.9 MICROGRAM(S)/KG/MIN: 10 INJECTION, EMULSION INTRAVENOUS at 11:34

## 2023-01-01 RX ADMIN — Medication 1 PUFF(S): at 00:49

## 2023-01-01 RX ADMIN — ALBUTEROL 2 PUFF(S): 90 AEROSOL, METERED ORAL at 01:32

## 2023-01-01 RX ADMIN — PROPOFOL 4.9 MICROGRAM(S)/KG/MIN: 10 INJECTION, EMULSION INTRAVENOUS at 16:15

## 2023-01-01 RX ADMIN — Medication 1 PUFF(S): at 08:48

## 2023-01-01 RX ADMIN — PIPERACILLIN AND TAZOBACTAM 25 GRAM(S): 4; .5 INJECTION, POWDER, LYOPHILIZED, FOR SOLUTION INTRAVENOUS at 01:06

## 2023-01-01 RX ADMIN — HEPARIN SODIUM 5000 UNIT(S): 5000 INJECTION INTRAVENOUS; SUBCUTANEOUS at 05:03

## 2023-01-01 RX ADMIN — Medication 81 MILLIGRAM(S): at 11:26

## 2023-01-01 RX ADMIN — CHLORHEXIDINE GLUCONATE 15 MILLILITER(S): 213 SOLUTION TOPICAL at 17:12

## 2023-01-01 RX ADMIN — AMIODARONE HYDROCHLORIDE 200 MILLIGRAM(S): 400 TABLET ORAL at 05:52

## 2023-01-01 RX ADMIN — Medication 1 PUFF(S): at 07:27

## 2023-01-01 RX ADMIN — ALBUTEROL 2 PUFF(S): 90 AEROSOL, METERED ORAL at 00:15

## 2023-01-01 RX ADMIN — ALBUTEROL 2 PUFF(S): 90 AEROSOL, METERED ORAL at 13:45

## 2023-01-01 RX ADMIN — MEROPENEM 100 MILLIGRAM(S): 1 INJECTION INTRAVENOUS at 05:42

## 2023-01-01 RX ADMIN — FENTANYL CITRATE 50 MICROGRAM(S): 50 INJECTION INTRAVENOUS at 00:05

## 2023-01-01 RX ADMIN — ALBUTEROL 2 PUFF(S): 90 AEROSOL, METERED ORAL at 13:41

## 2023-01-01 RX ADMIN — SODIUM CHLORIDE 75 MILLILITER(S): 9 INJECTION INTRAMUSCULAR; INTRAVENOUS; SUBCUTANEOUS at 15:22

## 2023-01-01 RX ADMIN — ALBUTEROL 2 PUFF(S): 90 AEROSOL, METERED ORAL at 00:43

## 2023-01-01 RX ADMIN — Medication 1 PUFF(S): at 00:44

## 2023-01-01 RX ADMIN — NYSTATIN CREAM 1 APPLICATION(S): 100000 CREAM TOPICAL at 18:10

## 2023-01-01 RX ADMIN — ROBINUL 0.2 MILLIGRAM(S): 0.2 INJECTION INTRAMUSCULAR; INTRAVENOUS at 18:06

## 2023-01-01 RX ADMIN — Medication 1 PUFF(S): at 13:41

## 2023-01-01 RX ADMIN — Medication 1 PUFF(S): at 19:42

## 2023-01-01 RX ADMIN — MIDAZOLAM HYDROCHLORIDE 2 MILLIGRAM(S): 1 INJECTION, SOLUTION INTRAMUSCULAR; INTRAVENOUS at 17:31

## 2023-01-01 RX ADMIN — ALBUTEROL 2 PUFF(S): 90 AEROSOL, METERED ORAL at 13:20

## 2023-01-01 RX ADMIN — HEPARIN SODIUM 5000 UNIT(S): 5000 INJECTION INTRAVENOUS; SUBCUTANEOUS at 21:16

## 2023-01-01 RX ADMIN — LACTULOSE 20 GRAM(S): 10 SOLUTION ORAL at 18:01

## 2023-01-01 RX ADMIN — FENTANYL CITRATE 50 MICROGRAM(S): 50 INJECTION INTRAVENOUS at 18:56

## 2023-01-01 RX ADMIN — PROPOFOL 4.9 MICROGRAM(S)/KG/MIN: 10 INJECTION, EMULSION INTRAVENOUS at 01:46

## 2023-01-01 RX ADMIN — DEXMEDETOMIDINE HYDROCHLORIDE IN 0.9% SODIUM CHLORIDE 4.08 MICROGRAM(S)/KG/HR: 4 INJECTION INTRAVENOUS at 00:59

## 2023-01-01 RX ADMIN — Medication 6.12 MICROGRAM(S)/KG/MIN: at 05:15

## 2023-01-01 RX ADMIN — ALBUTEROL 2 PUFF(S): 90 AEROSOL, METERED ORAL at 01:38

## 2023-01-01 RX ADMIN — HEPARIN SODIUM 5000 UNIT(S): 5000 INJECTION INTRAVENOUS; SUBCUTANEOUS at 15:31

## 2023-01-01 RX ADMIN — SODIUM CHLORIDE 1333.33 MILLILITER(S): 9 INJECTION INTRAMUSCULAR; INTRAVENOUS; SUBCUTANEOUS at 12:01

## 2023-01-01 RX ADMIN — Medication 1 PUFF(S): at 07:50

## 2023-01-01 RX ADMIN — NYSTATIN CREAM 1 APPLICATION(S): 100000 CREAM TOPICAL at 17:27

## 2023-01-01 RX ADMIN — Medication 1 PUFF(S): at 07:54

## 2023-01-01 RX ADMIN — ALBUTEROL 2 PUFF(S): 90 AEROSOL, METERED ORAL at 06:35

## 2023-01-01 RX ADMIN — AMIODARONE HYDROCHLORIDE 200 MILLIGRAM(S): 400 TABLET ORAL at 06:26

## 2023-01-01 RX ADMIN — PANTOPRAZOLE SODIUM 40 MILLIGRAM(S): 20 TABLET, DELAYED RELEASE ORAL at 12:00

## 2023-01-01 RX ADMIN — PANTOPRAZOLE SODIUM 40 MILLIGRAM(S): 20 TABLET, DELAYED RELEASE ORAL at 11:35

## 2023-01-01 RX ADMIN — CHLORHEXIDINE GLUCONATE 15 MILLILITER(S): 213 SOLUTION TOPICAL at 05:02

## 2023-01-01 RX ADMIN — FENTANYL CITRATE 25 MICROGRAM(S): 50 INJECTION INTRAVENOUS at 16:24

## 2023-01-01 RX ADMIN — CHLORHEXIDINE GLUCONATE 15 MILLILITER(S): 213 SOLUTION TOPICAL at 17:44

## 2023-01-01 RX ADMIN — ALBUTEROL 2 PUFF(S): 90 AEROSOL, METERED ORAL at 14:20

## 2023-01-01 RX ADMIN — Medication 1 PUFF(S): at 14:04

## 2023-01-01 RX ADMIN — Medication 1 PUFF(S): at 19:46

## 2023-01-01 RX ADMIN — ALBUTEROL 2 PUFF(S): 90 AEROSOL, METERED ORAL at 19:38

## 2023-01-01 RX ADMIN — PIPERACILLIN AND TAZOBACTAM 25 GRAM(S): 4; .5 INJECTION, POWDER, LYOPHILIZED, FOR SOLUTION INTRAVENOUS at 13:37

## 2023-01-01 RX ADMIN — AMIODARONE HYDROCHLORIDE 200 MILLIGRAM(S): 400 TABLET ORAL at 05:15

## 2023-01-01 RX ADMIN — SODIUM CHLORIDE 75 MILLILITER(S): 9 INJECTION, SOLUTION INTRAVENOUS at 05:06

## 2023-01-01 RX ADMIN — Medication 1: at 06:46

## 2023-01-01 RX ADMIN — DEXMEDETOMIDINE HYDROCHLORIDE IN 0.9% SODIUM CHLORIDE 4.08 MICROGRAM(S)/KG/HR: 4 INJECTION INTRAVENOUS at 20:08

## 2023-01-01 RX ADMIN — Medication 12.2 MICROGRAM(S)/KG/MIN: at 09:30

## 2023-01-01 RX ADMIN — MUPIROCIN 1 APPLICATION(S): 20 OINTMENT TOPICAL at 05:04

## 2023-01-01 RX ADMIN — Medication 12.2 MICROGRAM(S)/KG/MIN: at 01:10

## 2023-01-01 RX ADMIN — DEXMEDETOMIDINE HYDROCHLORIDE IN 0.9% SODIUM CHLORIDE 4.08 MICROGRAM(S)/KG/HR: 4 INJECTION INTRAVENOUS at 22:30

## 2023-01-01 RX ADMIN — PROPOFOL 4.9 MICROGRAM(S)/KG/MIN: 10 INJECTION, EMULSION INTRAVENOUS at 05:15

## 2023-01-01 RX ADMIN — AMIODARONE HYDROCHLORIDE 200 MILLIGRAM(S): 400 TABLET ORAL at 05:06

## 2023-01-01 RX ADMIN — Medication 12.2 MICROGRAM(S)/KG/MIN: at 16:15

## 2023-01-01 RX ADMIN — CHLORHEXIDINE GLUCONATE 15 MILLILITER(S): 213 SOLUTION TOPICAL at 05:41

## 2023-01-01 RX ADMIN — AMIODARONE HYDROCHLORIDE 200 MILLIGRAM(S): 400 TABLET ORAL at 05:41

## 2023-01-01 RX ADMIN — CHLORHEXIDINE GLUCONATE 15 MILLILITER(S): 213 SOLUTION TOPICAL at 05:51

## 2023-01-01 RX ADMIN — MEROPENEM 100 MILLIGRAM(S): 1 INJECTION INTRAVENOUS at 05:34

## 2023-01-01 RX ADMIN — Medication 81 MILLIGRAM(S): at 11:23

## 2023-01-01 RX ADMIN — Medication 1 PUFF(S): at 00:14

## 2023-01-01 RX ADMIN — PROPOFOL 7.34 MICROGRAM(S)/KG/MIN: 10 INJECTION, EMULSION INTRAVENOUS at 01:10

## 2023-01-01 RX ADMIN — ALBUTEROL 2 PUFF(S): 90 AEROSOL, METERED ORAL at 01:30

## 2023-01-01 RX ADMIN — ALBUTEROL 2 PUFF(S): 90 AEROSOL, METERED ORAL at 07:49

## 2023-01-01 RX ADMIN — Medication 1 PUFF(S): at 13:18

## 2023-01-01 RX ADMIN — MIDAZOLAM HYDROCHLORIDE 2 MILLIGRAM(S): 1 INJECTION, SOLUTION INTRAMUSCULAR; INTRAVENOUS at 02:52

## 2023-01-01 RX ADMIN — Medication 650 MILLIGRAM(S): at 07:30

## 2023-01-01 RX ADMIN — ALBUTEROL 2 PUFF(S): 90 AEROSOL, METERED ORAL at 08:08

## 2023-01-01 RX ADMIN — Medication 1 PUFF(S): at 07:11

## 2023-01-01 RX ADMIN — Medication 1 PUFF(S): at 07:20

## 2023-01-01 RX ADMIN — Medication 1 PUFF(S): at 01:07

## 2023-01-01 RX ADMIN — Medication 1 PUFF(S): at 20:34

## 2023-01-01 RX ADMIN — PROPOFOL 4.9 MICROGRAM(S)/KG/MIN: 10 INJECTION, EMULSION INTRAVENOUS at 08:28

## 2023-01-01 RX ADMIN — NYSTATIN CREAM 1 APPLICATION(S): 100000 CREAM TOPICAL at 06:29

## 2023-01-01 RX ADMIN — NYSTATIN CREAM 1 APPLICATION(S): 100000 CREAM TOPICAL at 17:36

## 2023-01-01 RX ADMIN — ALBUTEROL 2 PUFF(S): 90 AEROSOL, METERED ORAL at 13:13

## 2023-01-01 RX ADMIN — AMIODARONE HYDROCHLORIDE 200 MILLIGRAM(S): 400 TABLET ORAL at 06:12

## 2023-01-01 RX ADMIN — DEXMEDETOMIDINE HYDROCHLORIDE IN 0.9% SODIUM CHLORIDE 4.08 MICROGRAM(S)/KG/HR: 4 INJECTION INTRAVENOUS at 15:26

## 2023-01-01 RX ADMIN — HEPARIN SODIUM 5000 UNIT(S): 5000 INJECTION INTRAVENOUS; SUBCUTANEOUS at 14:10

## 2023-01-01 RX ADMIN — Medication 1 PUFF(S): at 01:38

## 2023-01-01 RX ADMIN — MIDAZOLAM HYDROCHLORIDE 2 MILLIGRAM(S): 1 INJECTION, SOLUTION INTRAMUSCULAR; INTRAVENOUS at 01:07

## 2023-01-01 RX ADMIN — CHLORHEXIDINE GLUCONATE 15 MILLILITER(S): 213 SOLUTION TOPICAL at 05:06

## 2023-01-01 RX ADMIN — ALBUTEROL 2 PUFF(S): 90 AEROSOL, METERED ORAL at 00:53

## 2023-01-01 RX ADMIN — ALBUTEROL 2 PUFF(S): 90 AEROSOL, METERED ORAL at 19:48

## 2023-01-01 RX ADMIN — DEXMEDETOMIDINE HYDROCHLORIDE IN 0.9% SODIUM CHLORIDE 4.08 MICROGRAM(S)/KG/HR: 4 INJECTION INTRAVENOUS at 08:11

## 2023-01-01 RX ADMIN — Medication 1 PUFF(S): at 13:20

## 2023-01-01 RX ADMIN — SODIUM CHLORIDE 75 MILLILITER(S): 9 INJECTION, SOLUTION INTRAVENOUS at 08:11

## 2023-01-01 RX ADMIN — CHLORHEXIDINE GLUCONATE 15 MILLILITER(S): 213 SOLUTION TOPICAL at 06:16

## 2023-01-01 RX ADMIN — PROPOFOL 4.9 MICROGRAM(S)/KG/MIN: 10 INJECTION, EMULSION INTRAVENOUS at 23:34

## 2023-01-01 RX ADMIN — PROPOFOL 4.9 MICROGRAM(S)/KG/MIN: 10 INJECTION, EMULSION INTRAVENOUS at 04:28

## 2023-01-01 RX ADMIN — PROPOFOL 4.9 MICROGRAM(S)/KG/MIN: 10 INJECTION, EMULSION INTRAVENOUS at 09:20

## 2023-01-01 RX ADMIN — Medication 1 PUFF(S): at 06:39

## 2023-01-01 RX ADMIN — PROPOFOL 4.9 MICROGRAM(S)/KG/MIN: 10 INJECTION, EMULSION INTRAVENOUS at 11:01

## 2023-01-01 RX ADMIN — PANTOPRAZOLE SODIUM 40 MILLIGRAM(S): 20 TABLET, DELAYED RELEASE ORAL at 11:37

## 2023-01-01 RX ADMIN — CHLORHEXIDINE GLUCONATE 15 MILLILITER(S): 213 SOLUTION TOPICAL at 17:19

## 2023-01-01 RX ADMIN — Medication 1 PUFF(S): at 13:33

## 2023-01-01 RX ADMIN — ALBUTEROL 2 PUFF(S): 90 AEROSOL, METERED ORAL at 01:00

## 2023-01-01 RX ADMIN — Medication 1 PUFF(S): at 20:39

## 2023-01-01 RX ADMIN — CEFEPIME 1000 MILLIGRAM(S): 1 INJECTION, POWDER, FOR SOLUTION INTRAMUSCULAR; INTRAVENOUS at 22:24

## 2023-01-01 RX ADMIN — CHLORHEXIDINE GLUCONATE 15 MILLILITER(S): 213 SOLUTION TOPICAL at 05:14

## 2023-01-01 RX ADMIN — ALBUTEROL 2 PUFF(S): 90 AEROSOL, METERED ORAL at 07:35

## 2023-01-01 RX ADMIN — ALBUTEROL 2 PUFF(S): 90 AEROSOL, METERED ORAL at 07:11

## 2023-01-01 RX ADMIN — ALBUTEROL 2 PUFF(S): 90 AEROSOL, METERED ORAL at 14:24

## 2023-01-01 RX ADMIN — MEROPENEM 100 MILLIGRAM(S): 1 INJECTION INTRAVENOUS at 17:22

## 2023-01-01 RX ADMIN — ALBUTEROL 2 PUFF(S): 90 AEROSOL, METERED ORAL at 13:18

## 2023-01-01 RX ADMIN — MEROPENEM 100 MILLIGRAM(S): 1 INJECTION INTRAVENOUS at 18:13

## 2023-01-01 RX ADMIN — PROPOFOL 4.9 MICROGRAM(S)/KG/MIN: 10 INJECTION, EMULSION INTRAVENOUS at 05:53

## 2023-01-01 RX ADMIN — ALBUTEROL 2 PUFF(S): 90 AEROSOL, METERED ORAL at 08:16

## 2023-01-01 RX ADMIN — Medication 12.2 MICROGRAM(S)/KG/MIN: at 09:38

## 2023-01-01 RX ADMIN — ALBUTEROL 2 PUFF(S): 90 AEROSOL, METERED ORAL at 08:48

## 2023-01-01 RX ADMIN — PROPOFOL 4.9 MICROGRAM(S)/KG/MIN: 10 INJECTION, EMULSION INTRAVENOUS at 23:13

## 2023-01-01 RX ADMIN — ALBUTEROL 2 PUFF(S): 90 AEROSOL, METERED ORAL at 00:31

## 2023-01-01 RX ADMIN — FENTANYL CITRATE 25 MICROGRAM(S): 50 INJECTION INTRAVENOUS at 21:54

## 2023-01-01 RX ADMIN — NYSTATIN CREAM 1 APPLICATION(S): 100000 CREAM TOPICAL at 05:35

## 2023-01-01 RX ADMIN — PANTOPRAZOLE SODIUM 40 MILLIGRAM(S): 20 TABLET, DELAYED RELEASE ORAL at 11:21

## 2023-01-01 RX ADMIN — ALBUTEROL 2 PUFF(S): 90 AEROSOL, METERED ORAL at 15:08

## 2023-01-01 RX ADMIN — Medication 1 PUFF(S): at 06:35

## 2023-01-01 RX ADMIN — CHLORHEXIDINE GLUCONATE 15 MILLILITER(S): 213 SOLUTION TOPICAL at 18:13

## 2023-01-01 RX ADMIN — PROPOFOL 4.9 MICROGRAM(S)/KG/MIN: 10 INJECTION, EMULSION INTRAVENOUS at 15:32

## 2023-01-01 RX ADMIN — CHLORHEXIDINE GLUCONATE 15 MILLILITER(S): 213 SOLUTION TOPICAL at 18:06

## 2023-01-01 RX ADMIN — NYSTATIN CREAM 1 APPLICATION(S): 100000 CREAM TOPICAL at 18:07

## 2023-01-01 RX ADMIN — ALBUTEROL 2 PUFF(S): 90 AEROSOL, METERED ORAL at 14:04

## 2023-01-01 RX ADMIN — CHLORHEXIDINE GLUCONATE 15 MILLILITER(S): 213 SOLUTION TOPICAL at 17:05

## 2023-01-01 RX ADMIN — MEROPENEM 100 MILLIGRAM(S): 1 INJECTION INTRAVENOUS at 21:11

## 2023-01-01 RX ADMIN — Medication 1 PUFF(S): at 08:09

## 2023-01-01 RX ADMIN — Medication 1 PUFF(S): at 07:00

## 2023-01-01 RX ADMIN — FENTANYL CITRATE 25 MICROGRAM(S): 50 INJECTION INTRAVENOUS at 14:18

## 2023-01-01 RX ADMIN — Medication 1 PUFF(S): at 20:37

## 2023-01-01 RX ADMIN — HEPARIN SODIUM 5000 UNIT(S): 5000 INJECTION INTRAVENOUS; SUBCUTANEOUS at 06:26

## 2023-01-01 RX ADMIN — AMIODARONE HYDROCHLORIDE 200 MILLIGRAM(S): 400 TABLET ORAL at 05:33

## 2023-01-01 RX ADMIN — Medication 81 MILLIGRAM(S): at 11:37

## 2023-01-01 RX ADMIN — Medication 1: at 00:03

## 2023-01-01 RX ADMIN — HEPARIN SODIUM 5000 UNIT(S): 5000 INJECTION INTRAVENOUS; SUBCUTANEOUS at 21:26

## 2023-01-01 RX ADMIN — Medication 1 PUFF(S): at 08:15

## 2023-01-01 RX ADMIN — Medication 1 PUFF(S): at 00:35

## 2023-01-01 RX ADMIN — FENTANYL CITRATE 25 MICROGRAM(S): 50 INJECTION INTRAVENOUS at 16:40

## 2023-01-01 RX ADMIN — SODIUM CHLORIDE 75 MILLILITER(S): 9 INJECTION INTRAMUSCULAR; INTRAVENOUS; SUBCUTANEOUS at 18:56

## 2023-01-01 RX ADMIN — ALBUTEROL 2 PUFF(S): 90 AEROSOL, METERED ORAL at 20:37

## 2023-01-01 RX ADMIN — Medication 81 MILLIGRAM(S): at 11:35

## 2023-01-01 RX ADMIN — Medication 1 PUFF(S): at 14:20

## 2023-01-01 RX ADMIN — MEROPENEM 100 MILLIGRAM(S): 1 INJECTION INTRAVENOUS at 06:11

## 2023-01-01 RX ADMIN — ALBUTEROL 2 PUFF(S): 90 AEROSOL, METERED ORAL at 07:38

## 2023-01-01 RX ADMIN — Medication 81 MILLIGRAM(S): at 11:38

## 2023-01-01 RX ADMIN — CHLORHEXIDINE GLUCONATE 15 MILLILITER(S): 213 SOLUTION TOPICAL at 05:03

## 2023-01-01 RX ADMIN — Medication 12.2 MICROGRAM(S)/KG/MIN: at 14:06

## 2023-01-01 RX ADMIN — MIDODRINE HYDROCHLORIDE 10 MILLIGRAM(S): 2.5 TABLET ORAL at 09:13

## 2023-01-01 RX ADMIN — CHLORHEXIDINE GLUCONATE 15 MILLILITER(S): 213 SOLUTION TOPICAL at 18:10

## 2023-01-01 RX ADMIN — Medication 81 MILLIGRAM(S): at 11:39

## 2023-01-01 RX ADMIN — PROPOFOL 4.9 MICROGRAM(S)/KG/MIN: 10 INJECTION, EMULSION INTRAVENOUS at 05:21

## 2023-01-01 RX ADMIN — ROBINUL 0.2 MILLIGRAM(S): 0.2 INJECTION INTRAMUSCULAR; INTRAVENOUS at 11:14

## 2023-01-01 RX ADMIN — FENTANYL CITRATE 25 MICROGRAM(S): 50 INJECTION INTRAVENOUS at 17:21

## 2023-01-01 RX ADMIN — HEPARIN SODIUM 5000 UNIT(S): 5000 INJECTION INTRAVENOUS; SUBCUTANEOUS at 06:16

## 2023-01-01 RX ADMIN — HEPARIN SODIUM 5000 UNIT(S): 5000 INJECTION INTRAVENOUS; SUBCUTANEOUS at 05:18

## 2023-01-01 RX ADMIN — PROPOFOL 4.9 MICROGRAM(S)/KG/MIN: 10 INJECTION, EMULSION INTRAVENOUS at 00:53

## 2023-01-01 RX ADMIN — Medication 300 MILLIGRAM(S): at 22:24

## 2023-01-01 RX ADMIN — Medication 1 PUFF(S): at 20:21

## 2023-01-01 RX ADMIN — HEPARIN SODIUM 5000 UNIT(S): 5000 INJECTION INTRAVENOUS; SUBCUTANEOUS at 05:04

## 2023-01-01 RX ADMIN — Medication 1 PUFF(S): at 01:00

## 2023-01-01 RX ADMIN — Medication 12.2 MICROGRAM(S)/KG/MIN: at 03:55

## 2023-01-01 RX ADMIN — MEROPENEM 100 MILLIGRAM(S): 1 INJECTION INTRAVENOUS at 05:02

## 2023-01-01 RX ADMIN — Medication 1 PUFF(S): at 06:30

## 2023-01-01 RX ADMIN — MEROPENEM 100 MILLIGRAM(S): 1 INJECTION INTRAVENOUS at 17:19

## 2023-01-01 RX ADMIN — Medication 1 PUFF(S): at 01:30

## 2023-01-01 RX ADMIN — HEPARIN SODIUM 5000 UNIT(S): 5000 INJECTION INTRAVENOUS; SUBCUTANEOUS at 05:34

## 2023-01-01 RX ADMIN — HEPARIN SODIUM 5000 UNIT(S): 5000 INJECTION INTRAVENOUS; SUBCUTANEOUS at 21:39

## 2023-01-01 RX ADMIN — ALBUTEROL 2 PUFF(S): 90 AEROSOL, METERED ORAL at 07:01

## 2023-01-01 RX ADMIN — NYSTATIN CREAM 1 APPLICATION(S): 100000 CREAM TOPICAL at 17:33

## 2023-01-01 RX ADMIN — ALBUTEROL 2 PUFF(S): 90 AEROSOL, METERED ORAL at 20:33

## 2023-01-01 RX ADMIN — Medication 1 PUFF(S): at 08:19

## 2023-01-01 NOTE — PROGRESS NOTE ADULT - ASSESSMENT
REVIEW OF SYMPTOMS      Able to give (reliable) ROS  NO     PHYSICAL EXAM    HEENT Unremarkable  atraumatic   RESP Fair air entry EXP prolonged    Harsh breath sound Resp distres mild   CARDIAC S1 S2 No S3     NO JVD    ABDOMEN SOFT BS PRESENT NOT DISTENDED No hepatosplenomegaly   PEDAL EDEMA present No calf tenderness  NO rash       GENERAL DATA .   GOC.   12/23/2022 full code  ALLGY.     nka                  WT.     12/24/2022 81           BMI.       12/24/2022 26          ICU STAY. .. 12/23/2022  COVID. ..  12/23/2022 scv2 (-)   BEST PRACTICE ISSUES.    HOB ELEVATN. Yes  DVT PPLX. ..    12/23/2022 hpsc   WHITMORE PPLX. ..    12/23/2022 protonix 40   INFN PPLX. ..  12/23/2022 chlorhexidine .12%   SP SW VIVIAN.         DIET.  .. 12/26 nepro 960 ng    IV fl... 12/28; 1/2 ns 75   PROCEDURES...   12/23/2022  l chest tube   12/23/2022 intubated   12/23/2022 reed       ABGS.  12/25/2022 ac 40% 743/44/108   12/24/2022 ac 60% 746/41/190   12/23/2022 11 a 100% 705/95/68     VS/ PO/IO/ VENT/ DRIPS.   1/1/2023 78 115/70   1/1/2023 4p dexm .014 m/k/h   1/1/2023 cpap 5/10/.3 14/520 rsbi 26     PREV ADMISSION 2/11-2/25/2022 [facility] P    AGE doa cc.  62 m doa 12/23/2022 resp distress    MAIN ISSUES.  CAC 12/23/2022 rosc 25 min   Intubation 12/23/2022  Vent mgmt 12/23/2022 -->   sedation.  Pneumothorax... 12/23/2022 cxr tension pntx-> ct  Infection.  Possible aspn pneum 12/23/2022  Possible uti 12/23/2022   .. ua 12/23/2022 w 26-50   .. mrsa 12/23 (+)   .. 12/24 et pseudo  .. 12/23 uc klebsiella   .. 12/23 zosyn -->   Lacticemia.  elevated lfts.  DERRICK.  Hypernatremia.  Anoxic encephalo    PMH  PMH COPD  PMH COVID (+) 2/11/2022   PMH S aureus bacteremia mssa 2/11   .. Ancef 2/12/2022 Dr Phan  PMH AICD  PMH HFREF  .. ECHO 11/20/2021 ef 20%  PMH A fib (on eliquis)     HOME MEDS INCLUDE.  apixaba 5.2   sacubitril valsartan 24 26    bumetanide 2   spironolactone 25  protonix 40   amiodarone 200     PROBLEMS ASSESSMENT RECOMMENDATIONS.  Intubation 12/23/2022  Vent mgmnt.   .. bundle dsv dsbt ltvv pplat 30 (-) PO2 60 (+) ph 7.3 (+)  sedation.  .. 12/23/2022 fentanyl 100.4p   .. 12/26/2022 is not requiring sedation (anoxic encephalo)   .. 12/30 dexmedetomid 400 in 100   Weaning.  .. 12/28-1/1/2023  failed cpap   COPD.  .. 12/23/2022 combivent .4     .. 12/23/2022 solumed 40.3 -> 12/24/2022 solumed 40   .. 12/23/2022 will taper to 40 on 12/24   .. cont rx  Code 12/23/2022    .. 12/23/2022 was  restless when he came in No defib  .. 12/23/2022 coded at 12.13 p pea arrest about 25 min rosc   .. 12/23/2022 monitor neuro recovery  .. 12/24/2022 eyes open gag (+) does not follow commands   Pneumothorax.  .. 12/23/2022 cxr tension pntx  .. 12/29/2022 ct ch unchanged small loculatd l pntx   decr abd gas and sc emphysema   .. 1/1/2023 plan is to remove ct after trach or after weaning off vent   Infection.   Possible aspn pneum 12/23/2022  Possible uti 12/23/2022   .. W 12/23- 12/24-12/25-12/26-12/27-12/28/2022   w 9.6 - 21 - 20- 13 - 14 - 12   .. pr 12/24-12/25/2022 pr 31- 24     .. ua 12/23/2022 w 26-50   .. ct cap 12/23 l chest tube sm l pntx chr small loculated r pl effs mod retropneumoperitonuem cw barotrauma   .. flu ab 12/23/2022 (-)  .. rsv 12/23/2022 (-)   .. bc 12/23 (-)   .. mrsa 12/23 (+)   .. 12/24 et pseudo  .. 12/23 uc klebsiella   .. 12/23 zosyn     CAD.  .. Tr 12/24-12/25/2022 Tr 259 -139  .. 12/23/2022 asa 81   .. monitor   Arrhythmia.  .. 12/30 amiodarone 200  CHF.  .. bnp 12/25-12/27/2022 70k - 51k   .. ho chf   .. chf meds to be reintroduced by cardio as allowed by bp   elevated lfts.  .. LFTS 12/23-12/24-12/25-12/26-12/27-12/31/2022       - [phone]- 306- 287-250     - 2524 - 1766- 670- 212-55      - 2066 - 2405 - 1824- 1041-236   .. 12/23/2022  ct cap pneumoretroperitoneum cw barotrauma   .. 12/23/2022 check hep profile   .. 12/23/2022 follow serially  .. elevcated lfts likely sec to anoxic hepatopathy during cac   DERRICK.  .. Cr 12/23-12/24-12/25-12/26-12/27-12/28-12/29-12/30-12/31/2022   Cr 1.7- 3.1- 4 - 4.5 - 4.2-4 - 3.3- 3.8 - 3.8   .. uo 12/25-12/26/2022 400  - 500    .. fluids and serial monitoring  .. 12/25/2022 renal calld   .. 12/26/2022 seen Dr Escalante feels derrick sec ATN recommended d5w   Hypernatremia.  .. Na 12/24-12/25-12/26/2022 Na 147 - 148 - 147  .. 12/26/2022 iv changed to d5 50    .. 12/29 free watr 250./4   AMS.  .. 12/25/2022 remains unresponsive   .. 12/25/2022 Nurse tells me that no sedation is being required   .. 12/23/2022 ct head(-)   .. 12/25/2022 neuro consultd   .. 12/26/2022 pt seen by Dr Hyatt To repeat ct in 72h  .. 12/29/2022 pt is much more awake and is moving l arm     TIME SPENT   Over 39 minutes aggregate critical care time spent on encounter; activities included   direct patient care, counseling and/or coordinating care reviewing notes, lab data/ imaging , discussion with multidisciplinary team/ patient  /family and explaining in detail risks, benefits, alternatives  of the recommendations     BIPIN DE LA CRUZ 61 m 12/23/2022 1960 DR KELVIN VANESSA

## 2023-01-01 NOTE — PROGRESS NOTE ADULT - SUBJECTIVE AND OBJECTIVE BOX
[facility] DIVISION OF INFECTIOUS DISEASES  MARINA Mccray Y. Patel, S. Shah, G. Casimir  [phone]  ([phone] - weekdays after 5pm and weekends)    Name: DOROTHY VOSS  Age/Gender: 62y Male  MRN: [id number]    Interval History:  Patient seen and examined  Remains intubated.   Notes reviewed. Afebrile   Allergies: No Known Allergies      Objective:  Vitals:   T(F): 97.4 (01-02-23 @ 00:00), Max: 98.2 (01-01-23 @ 21:00)  HR: 69 (01-02-23 @ 00:00) (65 - 94)  BP: 103/70 (01-02-23 @ 00:00) (87/62 - 125/82)  RR: 15 (01-02-23 @ 00:00) (14 - 21)  SpO2: 100% (01-02-23 @ 00:00) (98% - 100%)  Physical Examination:  General: no acute distress, intubated  HEENT: NC/AT, anicteric, ETT in place  Respiratory: decreased breath sounds b/l  Cardiovascular: S1 and S2 present, normal rate   Gastrointestinal: soft, nondistended  Extremities: no edema, no cyanosis  Skin: no visible rash    Laboratory Studies:  CBC:                       13.2   11.25 )-----------( 102      ( 31 Dec 2022 06:32 )             40.4     WBC Trend:  11.25 12-31-22 @ 06:32  13.69 12-30-22 @ 16:30  11.96 12-29-22 @ 12:29  12.51 12-28-22 @ 06:40  14.34 12-27-22 @ 06:36  13.56 12-26-22 @ 06:46    CMP: 12-31    138  |  104  |  115<H>  ----------------------------<  165<H>  3.5   |  26  |  3.88<H>    Ca    8.4      31 Dec 2022 06:32    TPro  5.3<L>  /  Alb  1.9<L>  /  TBili  1.1  /  DBili  x   /  AST  55<H>  /  ALT  236<H>  /  AlkPhos  250<H>  12-31    Creatinine, Serum: 3.88 mg/dL (12-31-22 @ 06:32)  Creatinine, Serum: 3.85 mg/dL (12-30-22 @ 16:30)  Creatinine, Serum: 3.93 mg/dL (12-29-22 @ 12:29)  Creatinine, Serum: 4.04 mg/dL (12-28-22 @ 06:40)  Creatinine, Serum: 4.20 mg/dL (12-27-22 @ 06:36)  Creatinine, Serum: 4.58 mg/dL (12-26-22 @ 06:46)      LIVER FUNCTIONS - ( 31 Dec 2022 06:32 )  Alb: 1.9 g/dL / Pro: 5.3 g/dL / ALK PHOS: 250 U/L / ALT: 236 U/L DA / AST: 55 U/L / GGT: x               Microbiology: reviewed     Culture - Sputum (collected 12-24-22 @ 17:43)  Source: ET Tube ET Tube  Gram Stain (12-25-22 @ 07:58):    Numerous polymorphonuclear leukocytes per low power field    No Squamous epithelial cells per low power field    Rare Gram Positive Rods seen per oil power field  Final Report (12-26-22 @ 16:49):    Rare Pseudomonas aeruginosa    Normal Respiratory Cathy absent  Organism: Pseudomonas aeruginosa (12-26-22 @ 16:49)  Organism: Pseudomonas aeruginosa (12-26-22 @ 16:49)      -  Amikacin: S <=16      -  Aztreonam: S 8      -  Cefepime: S <=2      -  Ceftazidime: S 4      -  Ciprofloxacin: S <=0.25      -  Gentamicin: S 4      -  Imipenem: S <=1      -  Levofloxacin: S <=0.5      -  Meropenem: S <=1      -  Piperacillin/Tazobactam: S <=8      -  Tobramycin: S <=2      Method Type: BAY    Culture - Blood (collected 12-23-22 @ 14:06)  Source: .Blood Blood-Peripheral  Final Report (12-28-22 @ 19:01):    No Growth Final    Culture - Urine (collected 12-23-22 @ 11:16)  Source: Clean Catch Clean Catch (Midstream)  Final Report (12-26-22 @ 07:02):    >100,000 CFU/ml Klebsiella oxytoca/Raoultella ornithinolytica  Organism: Klebsiella oxytoca /Raoutella ornithinolytica (12-26-22 @ 07:02)  Organism: Klebsiella oxytoca /Raoutella ornithinolytica (12-26-22 @ 07:02)      -  Amikacin: S <=16      -  Amoxicillin/Clavulanic Acid: S <=8/4      -  Ampicillin: R 16 These ampicillin results predict results for amoxicillin      -  Ampicillin/Sulbactam: S <=4/2 Enterobacter, Klebsiella aerogenes, Citrobacter, and Serratia may develop resistance during prolonged therapy (3-4 days)      -  Aztreonam: S <=4      -  Cefazolin: S <=2      -  Cefepime: S <=2      -  Cefoxitin: S <=8      -  Ceftriaxone: S <=1 Enterobacter, Klebsiella aerogenes, Citrobacter, and Serratia may develop resistance during prolonged therapy      -  Ciprofloxacin: S <=0.25      -  Ertapenem: S <=0.5      -  Gentamicin: S <=2      -  Imipenem: S <=1      -  Levofloxacin: S <=0.5      -  Meropenem: S <=1      -  Nitrofurantoin: S <=32 Should not be used to treat pyelonephritis      -  Piperacillin/Tazobactam: S <=8      -  Tobramycin: S <=2      -  Trimethoprim/Sulfamethoxazole: S <=0.5/9.5      Method Type: BAY    Culture - Blood (collected 12-23-22 @ 10:17)  Source: .Blood Blood-Peripheral  Final Report (12-28-22 @ 18:01):    No Growth Final        SARS-CoV-2 Result: NotDete (23 Dec 2022 10:16)    Radiology: reviewed     Medications:  albuterol    90 MICROgram(s) HFA Inhaler 2 Puff(s) Inhalation every 6 hours  albuterol/ipratropium for Nebulization. 3 milliLiter(s) Nebulizer once  aMIOdarone    Tablet 200 milliGRAM(s) Oral daily  aspirin  chewable 81 milliGRAM(s) Oral daily  chlorhexidine 0.12% Liquid 15 milliLiter(s) Oral Mucosa every 12 hours  dexMEDEtomidine Infusion 0.2 MICROgram(s)/kG/Hr IV Continuous <Continuous>  dextrose 5%. 1000 milliLiter(s) IV Continuous <Continuous>  dextrose 50% Injectable 25 Gram(s) IV Push once  dextrose 50% Injectable 12.5 Gram(s) IV Push once  dextrose 50% Injectable 25 Gram(s) IV Push once  dextrose Oral Gel 15 Gram(s) Oral once PRN  glucagon  Injectable 1 milliGRAM(s) IntraMuscular once  heparin   Injectable 5000 Unit(s) SubCutaneous every 12 hours  insulin lispro (ADMELOG) corrective regimen sliding scale   SubCutaneous every 6 hours  ipratropium 17 MICROgram(s) HFA Inhaler 1 Puff(s) Inhalation every 6 hours  lactulose Syrup 20 Gram(s) Oral two times a day  mupirocin 2% Nasal 1 Application(s) Both Nostrils two times a day  pantoprazole  Injectable 40 milliGRAM(s) IV Push daily  piperacillin/tazobactam IVPB.. 3.375 Gram(s) IV Intermittent every 12 hours  sodium chloride 0.45%. 1000 milliLiter(s) IV Continuous <Continuous>    Current Antimicrobials:  piperacillin/tazobactam IVPB.. 3.375 Gram(s) IV Intermittent every 12 hours    Prior/Completed Antimicrobials:  piperacillin/tazobactam IVPB...  vancomycin  IVPB  vancomycin  IVPB  vancomycin  IVPB.

## 2023-01-01 NOTE — PROGRESS NOTE ADULT - ASSESSMENT
Pt is a 62M CAD, Dilated cardiomyopathy, S/p AICD, Afib/flutter, h/o substance abuse, CKD baseline creat approx 1.4 last admitted to [facility] with MSSA bacteremia, and CHF.    Sent from [facility] SNF with acute hypoxic respiratory failure. Was initially on BiPAP then became hypoxic.  Anesthesia intubated patient.  On post intubation Xray pneumothorax evident.  Patient with PEA arrest.  Chest tube placed by ED physician.  ROSC obtained.       S/p PEA arrest  AHRF requiring intubation  Aspiration PNA   Loculated pleural effusions  - imaging reviewed  - BCx negative   - Sputum Cx -- Pseudomonas  - UCx -- Klebsiella  - susceptibilities reviewed  - WBC stable, afebrile  - c/w zosyn, plan x10 day course -- ends today 1/1/2023  - trend temps/WBC--elevated but stable  - maintain aspiration precautions  - ICU care    GOC per primary team    Indu Rivas M.D.  [facility], Division of Infectious Diseases  [phone]  After 5pm on weekdays and all day on weekends - please call [phone]

## 2023-01-01 NOTE — PROGRESS NOTE ADULT - SUBJECTIVE AND OBJECTIVE BOX
Chief Complaint: Respiratory failure    Interval Events: No events overnight.    Review of Systems:  Unable to obtain    Physical Exam:  Vital Signs Last 24 Hrs  T(C): 36.4 (01 Jan 2023 05:00), Max: 36.8 (31 Dec 2022 21:52)  T(F): 97.6 (01 Jan 2023 05:00), Max: 98.3 (31 Dec 2022 21:52)  HR: 72 (01 Jan 2023 09:46) (65 - 104)  BP: 107/71 (01 Jan 2023 09:30) (82/58 - 137/83)  BP(mean): 84 (01 Jan 2023 09:30) (67 - 102)  RR: 17 (01 Jan 2023 09:30) (15 - 31)  SpO2: 100% (01 Jan 2023 09:46) (95% - 100%)  Parameters below as of 01 Jan 2023 09:00  Patient On (Oxygen Delivery Method): ventilator  O2 Concentration (%): 30  General: Sedated  HEENT: Intubated  Neck: No JVD, no carotid bruit  Lungs: CTAB  CV: RRR, nl S1/S2, no M/R/G  Abdomen: S/NT/ND, +BS  Extremities: No LE edema, no cyanosis  Neuro: AAOx0  Skin: No rash    Labs:    12-31    138  |  104  |  115<H>  ----------------------------<  165<H>  3.5   |  26  |  3.88<H>    Ca    8.4      31 Dec 2022 06:32    TPro  5.3<L>  /  Alb  1.9<L>  /  TBili  1.1  /  DBili  x   /  AST  55<H>  /  ALT  236<H>  /  AlkPhos  250<H>  12-31                        13.2   11.25 )-----------( 102      ( 31 Dec 2022 06:32 )             40.4       ECG/Telemetry: Sinus rhythm

## 2023-01-01 NOTE — PROGRESS NOTE ADULT - ASSESSMENT
Pt is a 61 y/o M pmhx of HTN, dilated cardiomyopathy s/p AICD, CKD, opoid/substance abuse, afib/aflutter who presented to  ED w/ complaints of acute hypoxic respiratory failure requiring intubation. Complicated by L sided PTX following intubation, followed by PEA cardiac arrest, chest tube placed emergently in ED w/ ROSC, admitted to [facility]U. Course further complicated by ASP PNA, shock and DERRICK on CKD.     1. Sepsis   2. ASP PNA   3. Cardiac arrest   4. Hypoxic respiratory failure   5. L PTX   6. Metabolic encephalopathy   7. DERRICK on CKD   8. Transaminitis     Plan:   - Precedex for light sedation/agitation   - Intubated for respiratory failure Low TV ventilation using 4-6cc/kg of IBW for lung protective strategies, will titrate FiO2 to maintain SpO2>90%.   - Continue zosyn for ASP PNA.   - NPO w/ tube feeds, protonix for GI PPX   - Amio for hx of Afib/flutter   - Heparin for DVT PPX

## 2023-01-01 NOTE — PROGRESS NOTE ADULT - SUBJECTIVE AND OBJECTIVE BOX
Date/Time Patient Seen:  		  Referring MD:   Data Reviewed	       Patient is a 62y old  Male who presents with a chief complaint of resp failure (01 Jan 2023 10:08)      Subjective/HPI     PAST MEDICAL & SURGICAL HISTORY:  Heart failure, systolic    CAD (coronary artery disease)    Hypertension    Nonischemic cardiomyopathy    COPD, moderate    2019 novel coronavirus disease (COVID-19)    Substance abuse    HLD (hyperlipidemia)    Cardiac LV ejection fraction 10-20%    No significant past surgical history    AICD (automatic cardioverter/defibrillator) present    Cardiac LV ejection fraction 10-20%          Medication list         MEDICATIONS  (STANDING):  albuterol    90 MICROgram(s) HFA Inhaler 2 Puff(s) Inhalation every 6 hours  albuterol/ipratropium for Nebulization. 3 milliLiter(s) Nebulizer once  aMIOdarone    Tablet 200 milliGRAM(s) Oral daily  aspirin  chewable 81 milliGRAM(s) Oral daily  chlorhexidine 0.12% Liquid 15 milliLiter(s) Oral Mucosa every 12 hours  dexMEDEtomidine Infusion 0.2 MICROgram(s)/kG/Hr (4.08 mL/Hr) IV Continuous <Continuous>  dextrose 5%. 1000 milliLiter(s) (100 mL/Hr) IV Continuous <Continuous>  dextrose 50% Injectable 25 Gram(s) IV Push once  dextrose 50% Injectable 12.5 Gram(s) IV Push once  dextrose 50% Injectable 25 Gram(s) IV Push once  glucagon  Injectable 1 milliGRAM(s) IntraMuscular once  heparin   Injectable 5000 Unit(s) SubCutaneous every 12 hours  insulin lispro (ADMELOG) corrective regimen sliding scale   SubCutaneous every 6 hours  ipratropium 17 MICROgram(s) HFA Inhaler 1 Puff(s) Inhalation every 6 hours  lactulose Syrup 20 Gram(s) Oral two times a day  mupirocin 2% Nasal 1 Application(s) Both Nostrils two times a day  pantoprazole  Injectable 40 milliGRAM(s) IV Push daily  piperacillin/tazobactam IVPB.. 3.375 Gram(s) IV Intermittent every 12 hours  sodium chloride 0.45%. 1000 milliLiter(s) (75 mL/Hr) IV Continuous <Continuous>    MEDICATIONS  (PRN):  dextrose Oral Gel 15 Gram(s) Oral once PRN Blood Glucose LESS THAN 70 milliGRAM(s)/deciliter         Vitals log        ICU Vital Signs Last 24 Hrs  T(C): 36.6 (01 Jan 2023 12:17), Max: 36.8 (31 Dec 2022 21:52)  T(F): 97.9 (01 Jan 2023 12:17), Max: 98.3 (31 Dec 2022 21:52)  HR: 90 (01 Jan 2023 13:39) (65 - 104)  BP: 121/79 (01 Jan 2023 13:00) (82/58 - 137/83)  BP(mean): 92 (01 Jan 2023 13:00) (67 - 102)  ABP: --  ABP(mean): --  RR: 16 (01 Jan 2023 13:00) (14 - 31)  SpO2: 100% (01 Jan 2023 13:39) (97% - 100%)    O2 Parameters below as of 01 Jan 2023 13:39  Patient On (Oxygen Delivery Method): ventilator,30             Mode: CPAP with PS  FiO2: 30  PEEP: 5  PS: 10  MAP: 9  PIP: 16      Input and Output:  I&O's Detail    31 Dec 2022 07:01  -  01 Jan 2023 07:00  --------------------------------------------------------  IN:    Dexmedetomidine: 62 mL    Enteral Tube Flush: 1000 mL    IV PiggyBack: 200 mL    Lactated Ringers Bolus: 500 mL    Nepro with Carb Steady: 960 mL    sodium chloride 0.45%: 1725 mL  Total IN: 4447 mL    OUT:    Indwelling Catheter - Urethral (mL): 520 mL  Total OUT: 520 mL    Total NET: 3927 mL      01 Jan 2023 07:01  -  01 Jan 2023 15:14  --------------------------------------------------------  IN:    Dexmedetomidine: 14 mL    Enteral Tube Flush: 250 mL    Nepro with Carb Steady: 280 mL    sodium chloride 0.45%: 525 mL  Total IN: 1069 mL    OUT:  Total OUT: 0 mL    Total NET: 1069 mL          Lab Data                        13.2   11.25 )-----------( 102      ( 31 Dec 2022 06:32 )             40.4     12-31    138  |  104  |  115<H>  ----------------------------<  165<H>  3.5   |  26  |  3.88<H>    Ca    8.4      31 Dec 2022 06:32    TPro  5.3<L>  /  Alb  1.9<L>  /  TBili  1.1  /  DBili  x   /  AST  55<H>  /  ALT  236<H>  /  AlkPhos  250<H>  12-31            Review of Systems	      Objective     Physical Examination    heart s1s2  lung dc BS  head nc      Pertinent Lab findings & Imaging      Leo:  NO   Adequate UO     I&O's Detail    31 Dec 2022 07:01  -  01 Jan 2023 07:00  --------------------------------------------------------  IN:    Dexmedetomidine: 62 mL    Enteral Tube Flush: 1000 mL    IV PiggyBack: 200 mL    Lactated Ringers Bolus: 500 mL    Nepro with Carb Steady: 960 mL    sodium chloride 0.45%: 1725 mL  Total IN: 4447 mL    OUT:    Indwelling Catheter - Urethral (mL): 520 mL  Total OUT: 520 mL    Total NET: 3927 mL      01 Jan 2023 07:01  -  01 Jan 2023 15:14  --------------------------------------------------------  IN:    Dexmedetomidine: 14 mL    Enteral Tube Flush: 250 mL    Nepro with Carb Steady: 280 mL    sodium chloride 0.45%: 525 mL  Total IN: 1069 mL    OUT:  Total OUT: 0 mL    Total NET: 1069 mL               Discussed with:     Cultures:	        Radiology

## 2023-01-01 NOTE — PROGRESS NOTE ADULT - SUBJECTIVE AND OBJECTIVE BOX
Patient is a 62y old  Male who presents with a chief complaint of resp failure (01 Jan 2023 15:14)      SUBJECTIVE / OVERNIGHT EVENTS: pt seen and examined. NAD        Vital Signs Last 24 Hrs  T(C): 36.6 (01 Jan 2023 12:17), Max: 36.8 (31 Dec 2022 21:52)  T(F): 97.9 (01 Jan 2023 12:17), Max: 98.3 (31 Dec 2022 21:52)  HR: 90 (01 Jan 2023 13:39) (65 - 104)  BP: 121/79 (01 Jan 2023 13:00) (82/58 - 137/83)  BP(mean): 92 (01 Jan 2023 13:00) (67 - 102)  RR: 16 (01 Jan 2023 13:00) (14 - 31)  SpO2: 100% (01 Jan 2023 13:39) (97% - 100%)    Parameters below as of 01 Jan 2023 13:39  Patient On (Oxygen Delivery Method): ventilator,30      I&O's Summary    31 Dec 2022 07:01  -  01 Jan 2023 07:00  --------------------------------------------------------  IN: 4447 mL / OUT: 520 mL / NET: 3927 mL    01 Jan 2023 07:01  -  01 Jan 2023 15:21  --------------------------------------------------------  IN: 1069 mL / OUT: 0 mL / NET: 1069 mL        PHYSICAL EXAM:  GENERAL: NAD  HEAD:  Atraumatic, Normocephalic  NECK: Supple  CHEST/LUNG: CTABL  HEART: S1+S2  SKIN: No rashes or lesions    LABS:                        13.2   11.25 )-----------( 102      ( 31 Dec 2022 06:32 )             40.4     12-31    138  |  104  |  115<H>  ----------------------------<  165<H>  3.5   |  26  |  3.88<H>    Ca    8.4      31 Dec 2022 06:32    TPro  5.3<L>  /  Alb  1.9<L>  /  TBili  1.1  /  DBili  x   /  AST  55<H>  /  ALT  236<H>  /  AlkPhos  250<H>  12-31      CAPILLARY BLOOD GLUCOSE      POCT Blood Glucose.: 113 mg/dL (01 Jan 2023 11:26)  POCT Blood Glucose.: 122 mg/dL (01 Jan 2023 06:45)  POCT Blood Glucose.: 131 mg/dL (31 Dec 2022 23:33)  POCT Blood Glucose.: 174 mg/dL (31 Dec 2022 17:00)            RADIOLOGY & ADDITIONAL TESTS:    Imaging Personally Reviewed:  [x] YES  [ ] NO    Consultant(s) Notes Reviewed:  [x] YES  [ ] NO      MEDICATIONS  (STANDING):  albuterol    90 MICROgram(s) HFA Inhaler 2 Puff(s) Inhalation every 6 hours  albuterol/ipratropium for Nebulization. 3 milliLiter(s) Nebulizer once  aMIOdarone    Tablet 200 milliGRAM(s) Oral daily  aspirin  chewable 81 milliGRAM(s) Oral daily  chlorhexidine 0.12% Liquid 15 milliLiter(s) Oral Mucosa every 12 hours  dexMEDEtomidine Infusion 0.2 MICROgram(s)/kG/Hr (4.08 mL/Hr) IV Continuous <Continuous>  dextrose 5%. 1000 milliLiter(s) (100 mL/Hr) IV Continuous <Continuous>  dextrose 50% Injectable 25 Gram(s) IV Push once  dextrose 50% Injectable 12.5 Gram(s) IV Push once  dextrose 50% Injectable 25 Gram(s) IV Push once  glucagon  Injectable 1 milliGRAM(s) IntraMuscular once  heparin   Injectable 5000 Unit(s) SubCutaneous every 12 hours  insulin lispro (ADMELOG) corrective regimen sliding scale   SubCutaneous every 6 hours  ipratropium 17 MICROgram(s) HFA Inhaler 1 Puff(s) Inhalation every 6 hours  lactulose Syrup 20 Gram(s) Oral two times a day  mupirocin 2% Nasal 1 Application(s) Both Nostrils two times a day  pantoprazole  Injectable 40 milliGRAM(s) IV Push daily  piperacillin/tazobactam IVPB.. 3.375 Gram(s) IV Intermittent every 12 hours  sodium chloride 0.45%. 1000 milliLiter(s) (75 mL/Hr) IV Continuous <Continuous>    MEDICATIONS  (PRN):  dextrose Oral Gel 15 Gram(s) Oral once PRN Blood Glucose LESS THAN 70 milliGRAM(s)/deciliter      Care Discussed with Consultants/Other Providers [x] YES  [ ] NO    HEALTH ISSUES - PROBLEM Dx:

## 2023-01-01 NOTE — PROGRESS NOTE ADULT - SUBJECTIVE AND OBJECTIVE BOX
DOR[facility] VOSS    [facility]    Allergies    No Known Allergies    Intolerances    pork (Other)      PAST MEDICAL & SURGICAL HISTORY:  Heart failure, systolic      CAD (coronary artery disease)      Hypertension      Nonischemic cardiomyopathy      COPD, moderate      2019 novel coronavirus disease (COVID-19)      Substance abuse      HLD (hyperlipidemia)      Cardiac LV ejection fraction 10-20%      AICD (automatic cardioverter/defibrillator) present      Cardiac LV ejection fraction 10-20%          FAMILY HISTORY:  FH: lung cancer        Home Medications:  acetaminophen 325 mg oral tablet: 2 tab(s) orally every 6 hours, As needed, Temp greater or equal to 38C (100.4F), Mild Pain (1 - 3) (23 Dec 2022 10:07)  apixaban 5 mg oral tablet: 1 tab(s) orally every 12 hours (23 Dec 2022 10:07)  Aquaphor Healing topical ointment: Apply topically to affected area once a day (23 Dec 2022 10:07)  ascorbic acid 500 mg oral tablet: 1 tab(s) orally once a day (23 Dec 2022 10:07)  Bacid (LAC) oral capsule: 2 cap(s) orally once a day (23 Dec 2022 10:07)  bumetanide 2 mg oral tablet: 1 tab(s) orally 2 times a day (23 Dec 2022 10:07)  gabapentin 100 mg oral capsule: 1 cap(s) orally 3 times a day (23 Dec 2022 10:07)  melatonin 3 mg oral tablet: 1 tab(s) orally once a day (at bedtime), As needed, Insomnia (23 Dec 2022 10:07)  Multiple Vitamins with Minerals oral tablet: 1 tab(s) orally once a day (23 Dec 2022 10:07)  pantoprazole 40 mg oral delayed release tablet: 1 tab(s) orally once a day (before a meal) (23 Dec 2022 10:07)  sacubitril-valsartan 24 mg-26 mg oral tablet: 1 tab(s) orally 2 times a day (23 Dec 2022 10:07)  simethicone 80 mg oral tablet: 2 tab(s) orally every 6 hours, As Needed (23 Dec 2022 10:07)  spironolactone 25 mg oral tablet: 1 tab(s) orally once a day (23 Dec 2022 10:07)  Symbicort 160 mcg-4.5 mcg/inh inhalation aerosol: 2 puff(s) inhaled 2 times a day (23 Dec 2022 10:07)  Ventolin HFA 90 mcg/inh inhalation aerosol: 2 puff(s) inhaled every 6 hours, As Needed (23 Dec 2022 10:07)      MEDICATIONS  (STANDING):  albuterol    90 MICROgram(s) HFA Inhaler 2 Puff(s) Inhalation every 6 hours  albuterol/ipratropium for Nebulization. 3 milliLiter(s) Nebulizer once  aMIOdarone    Tablet 200 milliGRAM(s) Oral daily  aspirin  chewable 81 milliGRAM(s) Oral daily  chlorhexidine 0.12% Liquid 15 milliLiter(s) Oral Mucosa every 12 hours  dexMEDEtomidine Infusion 0.2 MICROgram(s)/kG/Hr (4.08 mL/Hr) IV Continuous <Continuous>  dextrose 5%. 1000 milliLiter(s) (100 mL/Hr) IV Continuous <Continuous>  dextrose 50% Injectable 25 Gram(s) IV Push once  dextrose 50% Injectable 12.5 Gram(s) IV Push once  dextrose 50% Injectable 25 Gram(s) IV Push once  glucagon  Injectable 1 milliGRAM(s) IntraMuscular once  heparin   Injectable 5000 Unit(s) SubCutaneous every 12 hours  insulin lispro (ADMELOG) corrective regimen sliding scale   SubCutaneous every 6 hours  ipratropium 17 MICROgram(s) HFA Inhaler 1 Puff(s) Inhalation every 6 hours  lactulose Syrup 20 Gram(s) Oral two times a day  mupirocin 2% Nasal 1 Application(s) Both Nostrils two times a day  pantoprazole  Injectable 40 milliGRAM(s) IV Push daily  piperacillin/tazobactam IVPB.. 3.375 Gram(s) IV Intermittent every 12 hours  sodium chloride 0.45%. 1000 milliLiter(s) (75 mL/Hr) IV Continuous <Continuous>    MEDICATIONS  (PRN):  dextrose Oral Gel 15 Gram(s) Oral once PRN Blood Glucose LESS THAN 70 milliGRAM(s)/deciliter      Diet, NPO with Tube Feed:   Tube Feeding Modality: Nasogastric  Nepro with Carb Steady  Total Volume for 24 Hours (mL): 960  Continuous  Starting Tube Feed Rate mL per Hour: 20  Increase Tube Feed Rate by (mL): 10     Every 4 hours  Until Goal Tube Feed Rate (mL per Hour): 40  Tube Feed Duration (in Hours): 24  Tube Feed Start Time: 13:00  Free Water Flush  Pump   Rate (mL per Hour): 30 (12-26-22 @ 23:12) [Active]          Vital Signs Last 24 Hrs  T(C): 36.4 (01 Jan 2023 05:00), Max: 36.8 (31 Dec 2022 21:52)  T(F): 97.6 (01 Jan 2023 05:00), Max: 98.3 (31 Dec 2022 21:52)  HR: 72 (01 Jan 2023 09:46) (65 - 104)  BP: 107/71 (01 Jan 2023 09:30) (82/58 - 137/83)  BP(mean): 84 (01 Jan 2023 09:30) (67 - 102)  RR: 17 (01 Jan 2023 09:30) (15 - 31)  SpO2: 100% (01 Jan 2023 09:46) (95% - 100%)    Parameters below as of 01 Jan 2023 09:00  Patient On (Oxygen Delivery Method): ventilator    O2 Concentration (%): 30      12-31-22 @ 07:01  -  01-01-23 @ 07:00  --------------------------------------------------------  IN: 4447 mL / OUT: 520 mL / NET: 3927 mL        Mode: CPAP with PS, FiO2: 30, PEEP: 5, PS: 10, MAP: 9, PIP: 16      LABS:                        13.2   11.25 )-----------( 102      ( 31 Dec 2022 06:32 )             40.4     12-31    138  |  104  |  115<H>  ----------------------------<  165<H>  3.5   |  26  |  3.88<H>    Ca    8.4      31 Dec 2022 06:32    TPro  5.3<L>  /  Alb  1.9<L>  /  TBili  1.1  /  DBili  x   /  AST  55<H>  /  ALT  236<H>  /  AlkPhos  250<H>  12-31              WBC:  WBC Count: 11.25 K/uL (12-31 @ 06:32)  WBC Count: 13.69 K/uL (12-30 @ 16:30)  WBC Count: 11.96 K/uL (12-29 @ 12:29)      MICROBIOLOGY:  RECENT CULTURES:                  Sodium:  Sodium, Serum: 138 mmol/L (12-31 @ 06:32)  Sodium, Serum: 142 mmol/L (12-30 @ 16:30)  Sodium, Serum: 144 mmol/L (12-29 @ 12:29)      3.88 mg/dL 12-31 @ 06:32  3.85 mg/dL 12-30 @ 16:30  3.93 mg/dL 12-29 @ 12:29      Hemoglobin:  Hemoglobin: 13.2 g/dL (12-31 @ 06:32)  Hemoglobin: 13.8 g/dL (12-30 @ 16:30)  Hemoglobin: 15.5 g/dL (12-29 @ 12:29)      Platelets: Platelet Count - Automated: 102 K/uL (12-31 @ 06:32)  Platelet Count - Automated: 100 K/uL (12-30 @ 16:30)  Platelet Count - Automated: 116 K/uL (12-29 @ 12:29)      LIVER FUNCTIONS - ( 31 Dec 2022 06:32 )  Alb: 1.9 g/dL / Pro: 5.3 g/dL / ALK PHOS: 250 U/L / ALT: 236 U/L DA / AST: 55 U/L / GGT: x                 RADIOLOGY & ADDITIONAL STUDIES:      MICROBIOLOGY:  RECENT CULTURES:

## 2023-01-01 NOTE — PROGRESS NOTE ADULT - ASSESSMENT
62M CAD, Dilated cardiomyopathy, S/p AICD, Afib/flutter, h/o substance abuse,  presented with acute hypoxic and hypercarbic respiratory failure associated with pneumothorax.      ptx  resp failure  arnoldo hx  CM  AICD  AF    on zosyn  on ivf  on amio  vs noted    no immediate family  friends only on listed number  no decision making capacity from friends  may need guardianship and decision from 2 MD's

## 2023-01-01 NOTE — PROGRESS NOTE ADULT - SUBJECTIVE AND OBJECTIVE BOX
Patient is a 62y old  Male who presents with a chief complaint of resp failure (01 Jan 2023 15:18)      BRIEF HOSPITAL COURSE: Pt is a 61 y/o M pmhx of HTN, dilated cardiomyopathy s/p AICD, CKD, opoid/substance abuse, afib/aflutter who presented to  ED w/ complaints of acute hypoxic respiratory failure requiring intubation. Complicated by L sided PTX following intubation, followed by PEA cardiac arrest, chest tube placed emergently in ED w/ ROSC, admitted to [facility]. Course further complicated by ASP PNA, shock and DERRICK on CKD.     Events last 24 hours: Remains intubated on full vent support.     PAST MEDICAL & SURGICAL HISTORY:  Heart failure, systolic      CAD (coronary artery disease)      Hypertension      Nonischemic cardiomyopathy      COPD, moderate      2019 novel coronavirus disease (COVID-19)      Substance abuse      HLD (hyperlipidemia)      Cardiac LV ejection fraction 10-20%      AICD (automatic cardioverter/defibrillator) present      Cardiac LV ejection fraction 10-20%          Review of Systems:  Unable to assess secondary to mentation      Medications:  piperacillin/tazobactam IVPB.. 3.375 Gram(s) IV Intermittent every 12 hours    aMIOdarone    Tablet 200 milliGRAM(s) Oral daily    albuterol    90 MICROgram(s) HFA Inhaler 2 Puff(s) Inhalation every 6 hours  albuterol/ipratropium for Nebulization. 3 milliLiter(s) Nebulizer once  ipratropium 17 MICROgram(s) HFA Inhaler 1 Puff(s) Inhalation every 6 hours    dexMEDEtomidine Infusion 0.2 MICROgram(s)/kG/Hr IV Continuous <Continuous>      aspirin  chewable 81 milliGRAM(s) Oral daily  heparin   Injectable 5000 Unit(s) SubCutaneous every 12 hours    lactulose Syrup 20 Gram(s) Oral two times a day  pantoprazole  Injectable 40 milliGRAM(s) IV Push daily      dextrose 50% Injectable 25 Gram(s) IV Push once  dextrose 50% Injectable 12.5 Gram(s) IV Push once  dextrose 50% Injectable 25 Gram(s) IV Push once  dextrose Oral Gel 15 Gram(s) Oral once PRN  glucagon  Injectable 1 milliGRAM(s) IntraMuscular once  insulin lispro (ADMELOG) corrective regimen sliding scale   SubCutaneous every 6 hours    dextrose 5%. 1000 milliLiter(s) IV Continuous <Continuous>  sodium chloride 0.45%. 1000 milliLiter(s) IV Continuous <Continuous>      chlorhexidine 0.12% Liquid 15 milliLiter(s) Oral Mucosa every 12 hours  mupirocin 2% Nasal 1 Application(s) Both Nostrils two times a day        Mode: AC/ CMV (Assist Control/ Continuous Mandatory Ventilation)  RR (machine): 20  TV (machine): 400  FiO2: 30  PEEP: 5  ITime: 1  MAP: 14  PIP: 40      ICU Vital Signs Last 24 Hrs  T(C): 36.8 (01 Jan 2023 21:00), Max: 36.8 (01 Jan 2023 21:00)  T(F): 98.2 (01 Jan 2023 21:00), Max: 98.2 (01 Jan 2023 21:00)  HR: 74 (01 Jan 2023 23:00) (65 - 94)  BP: 115/83 (01 Jan 2023 23:00) (86/65 - 125/82)  BP(mean): 93 (01 Jan 2023 23:00) (70 - 94)  ABP: --  ABP(mean): --  RR: 19 (01 Jan 2023 23:00) (14 - 21)  SpO2: 99% (01 Jan 2023 23:00) (98% - 100%)    O2 Parameters below as of 01 Jan 2023 23:00  Patient On (Oxygen Delivery Method): ventilator                I&O's Detail    31 Dec 2022 07:01  -  01 Jan 2023 07:00  --------------------------------------------------------  IN:    Dexmedetomidine: 62 mL    Enteral Tube Flush: 1000 mL    IV PiggyBack: 200 mL    Lactated Ringers Bolus: 500 mL    Nepro with Carb Steady: 960 mL    sodium chloride 0.45%: 1725 mL  Total IN: 4447 mL    OUT:    Indwelling Catheter - Urethral (mL): 520 mL  Total OUT: 520 mL    Total NET: 3927 mL      01 Jan 2023 07:01  -  01 Jan 2023 23:19  --------------------------------------------------------  IN:    Dexmedetomidine: 38.9 mL    Enteral Tube Flush: 500 mL    Nepro with Carb Steady: 640 mL    sodium chloride 0.45%: 1200 mL  Total IN: 2378.9 mL    OUT:    Indwelling Catheter - Urethral (mL): 70 mL  Total OUT: 70 mL    Total NET: 2308.9 mL            LABS:                        13.2   11.25 )-----------( 102      ( 31 Dec 2022 06:32 )             40.4     12-31    138  |  104  |  115<H>  ----------------------------<  165<H>  3.5   |  26  |  3.88<H>    Ca    8.4      31 Dec 2022 06:32    TPro  5.3<L>  /  Alb  1.9<L>  /  TBili  1.1  /  DBili  x   /  AST  55<H>  /  ALT  236<H>  /  AlkPhos  250<H>  12-31          CAPILLARY BLOOD GLUCOSE      POCT Blood Glucose.: 115 mg/dL (01 Jan 2023 17:07)        CULTURES:      Physical Examination:    General: Pt laying in hospital bed in no acute distress.  Follows commands on vent.     HEENT: Pupils equal, reactive to light.  Symmetric.    PULM: Clear to auscultation bilaterally, no significant sputum production    CVS: Regular rate and rhythm, no murmurs, rubs, or gallops    ABD: Soft, nondistended, nontender, normoactive bowel sounds, no masses    EXT: No edema, nontender    SKIN: Warm and well perfused.       RADIOLOGY: < from: CT Chest No Cont (12.29.22 @ 13:20) >  ACC: [id number] EXAM:  CT CHEST                          PROCEDURE DATE:  12/29/2022          INTERPRETATION:  HISTORY: Admitting Dxs: J96.01 RESP DISTRESS    EXAMINATION: CT CHEST was performed without IV contrast.    COMPARISON: 12/3/2022.    FINDINGS:    AIRWAYS, LUNGS, PLEURA: Satisfactory positioning of endotracheal tube.   Mild central airways secretions. Unchanged small loculated left   pneumothorax. Small chronic loculated right pleural effusion unchanged.   Right basilar and right middlelobe atelectasis. Emphysema.    Left chest tube in place with distal tip along the medial and upper   pleural space.    MEDIASTINUM: Cardiomegaly. No pericardial effusion. Thoracic aorta normal   caliber.  No large mediastinal lymph nodes. ICD lead terminates in the   right ventricle.    IMAGED ABDOMEN: Enteric catheter terminates in the stomach.   Cholelithiasis. Decreased abdominal extraluminal gas.    SOFT TISSUES: Decreased subcutaneous soft tissue emphysema.    BONES: Unremarkable.      IMPRESSION:.    Unchanged small loculated left pneumothorax.    Unchanged small loculated and chronic right pleural effusion.    Decreased subcutaneous soft tissue emphysema and extraluminal abdominal   gas.    --- End of Report ---             MARILYN WEST MD; Attending Radiologist  This document has been electronically signed. Dec 29 2022  1:57PM

## 2023-01-01 NOTE — PROGRESS NOTE ADULT - ASSESSMENT
62M CAD, Dilated cardiomyopathy, S/p AICD, Afib/flutter, h/o substance abuse,  presented with acute hypoxic and hypercarbic respiratory failure associated with pneumothorax which lled to cardiac arrest       Acute respiratory failure secondary to tension pneumothorax with leading to cardiac arrest   - continue vent management;  weaning vs. trach next week.  ENT aware of poss trach.   - continue chest tube to water seal, d/w CT surgery -keep chest tube in until after extubation or trach.  can keep on water seal until then.  - continue on zosyn as per ID till 1/1/23 to complete 10 day course    hypernatremia  - improved  - cont IVF and continue to monitor      Cardiomyopathy  - current cardiac issues appear to be secondary to pulm issues at this time  - hold eliquis pending possible further procedures and hospital course    DM2  - not on meds at SNF  - FS monitoring with lispro coverage scale while critically ill    DERRICK on CKD  - Likely ATN due to above, possible HF as well  - monitor creatinine,   - avoid nephrotoxic agents    Prophylactic measure  - DVT proph: heparin SQ for now  - GI proph: protonix

## 2023-01-02 NOTE — PROGRESS NOTE ADULT - ASSESSMENT
Pt is a 62M CAD, Dilated cardiomyopathy, S/p AICD, Afib/flutter, h/o substance abuse, CKD baseline creat approx 1.4 last admitted to [facility] with MSSA bacteremia, and CHF.    Sent from [facility] with acute hypoxic respiratory failure. Was initially on BiPAP then became hypoxic.  Anesthesia intubated patient.  On post intubation Xray pneumothorax evident.  Patient with PEA arrest.  Chest tube placed by ED physician.  ROSC obtained.       S/p PEA arrest  AHRF requiring intubation  Aspiration PNA   Loculated pleural effusions  - imaging reviewed  - BCx negative   - Sputum Cx -- Pseudomonas  - UCx -- Klebsiella  - susceptibilities reviewed  - WBC stable, afebrile  - s/p zosyn x10d course completed 1/1/2023  - monitor off abx  - trend temps/WBC--stable  - maintain aspiration precautions  - ICU care  GOC per primary team      Dr. Frazier to follow from tomorrow  Indu Rivas M.D.  [facility] [place], Division of Infectious Diseases  [phone]  After 5pm on weekdays and all day on weekends - please call [phone]

## 2023-01-02 NOTE — PROGRESS NOTE ADULT - SUBJECTIVE AND OBJECTIVE BOX
N  EPHROLOGY PROGRESS NOTE    CHIEF COMPLAINT:  DERRICK/CKD    HPI:  Renal function slowly better.  Remains vented, tube fed.  Urine sluggish.    EXAM:  T(F): 97.4 (01-02-23 @ 12:53)  HR: 61 (01-02-23 @ 14:00)  BP: 101/70 (01-02-23 @ 14:00)  RR: 23 (01-02-23 @ 14:00)  SpO2: 100% (01-02-23 @ 14:00)    Sedated  Normal respiratory effort, lungs clear bilaterally  Heart RRR with no murmur, no peripheral edema         Impression  1.  DERRICK on CKd 3 (Baseline creatinine 1.3-1.5): Ischemic ATN  2.  Acute respiratory failure and PEA arrest  3.  Mild hypernatremia, resolved  4.  S/p large left sided tension pneumothorax    Recommend  Update labs  Prognosis guarded

## 2023-01-02 NOTE — PROGRESS NOTE ADULT - ASSESSMENT
62M CAD, Dilated cardiomyopathy, S/p AICD, Afib/flutter, h/o substance abuse,  presented with acute hypoxic and hypercarbic respiratory failure associated with pneumothorax which lled to cardiac arrest       Acute respiratory failure secondary to tension pneumothorax with leading to cardiac arrest   - continue vent management;  weaning vs. trach .  ENT aware of poss trach.   - continue chest tube to water seal, d/w CT surgery -keep chest tube in until after extubation or trach.  can keep on water seal until then.  - Zosyn total 10 days.     hypernatremia  - improved  - cont IVF and continue to monitor    Cardiomyopathy  - current cardiac issues appear to be secondary to pulm issues at this time  - hold eliquis pending possible further procedures and hospital course    DM2  - not on meds at SNF  - FS monitoring with lispro coverage scale while critically ill    DERRICK on CKD  - Likely ATN due to above, possible HF as well  - monitor creatinine,   - avoid nephrotoxic agents    Prophylactic measure  - DVT proph: heparin SQ for now  - GI proph: protonix

## 2023-01-02 NOTE — CONSULT NOTE ADULT - SUBJECTIVE AND OBJECTIVE BOX
Chief Complaint:  Patient is a 62y old  Male who presents with a chief complaint of resp failure   62M CAD, Dilated cardiomyopathy, S/p AICD, Afib/flutter, h/o substance abuse, CKD baseline creat approx 1.4 last admitted to [facility] with MSSA bacteremia, and CHF.  Sent from [facility] SNF with acute hypoxic respiratory failure.  Was initially on BiPAP then became hypoxic.  Anesthesia intubated patient.  On post intubation Xray pneumothorax evident.  Patient with PEA arrest.  Chest tube placed by ED physician.  ROSC obtained.     Patient unable to give further history.         Review of Systems:  Unable to obtain due to: Altered mentation    Allergies:  No Known Allergies  pork (Other)      Medications:  albuterol    90 MICROgram(s) HFA Inhaler 2 Puff(s) Inhalation every 6 hours  albuterol/ipratropium for Nebulization. 3 milliLiter(s) Nebulizer once  aMIOdarone    Tablet 200 milliGRAM(s) Oral daily  aspirin  chewable 81 milliGRAM(s) Oral daily  chlorhexidine 0.12% Liquid 15 milliLiter(s) Oral Mucosa every 12 hours  dexMEDEtomidine Infusion 0.2 MICROgram(s)/kG/Hr IV Continuous <Continuous>  dextrose 5%. 1000 milliLiter(s) IV Continuous <Continuous>  dextrose 50% Injectable 25 Gram(s) IV Push once  dextrose 50% Injectable 12.5 Gram(s) IV Push once  dextrose 50% Injectable 25 Gram(s) IV Push once  dextrose Oral Gel 15 Gram(s) Oral once PRN  glucagon  Injectable 1 milliGRAM(s) IntraMuscular once  heparin   Injectable 5000 Unit(s) SubCutaneous every 12 hours  insulin lispro (ADMELOG) corrective regimen sliding scale   SubCutaneous every 6 hours  ipratropium 17 MICROgram(s) HFA Inhaler 1 Puff(s) Inhalation every 6 hours  lactulose Syrup 20 Gram(s) Oral two times a day  mupirocin 2% Nasal 1 Application(s) Both Nostrils two times a day  pantoprazole  Injectable 40 milliGRAM(s) IV Push daily  sodium chloride 0.45%. 1000 milliLiter(s) IV Continuous <Continuous>      PMHX/PSHX:  Heart failure, systolic    CAD (coronary artery disease)    Hypertension    Nonischemic cardiomyopathy    COPD, moderate    2019 novel coronavirus disease (COVID-19)    Substance abuse    HLD (hyperlipidemia)    Cardiac LV ejection fraction 10-20%    No significant past surgical history    AICD (automatic cardioverter/defibrillator) present    Cardiac LV ejection fraction 10-20%        Family history:  No pertinent family history in first degree relatives    FH: lung cancer        Social History:     ROS:     General:  No wt loss, fevers, chills, night sweats, fatigue,   Eyes:  Good vision, no reported pain  ENT:  No sore throat, pain, runny nose, dysphagia  CV:  No pain, palpitations, hypo/hypertension  Resp:  No dyspnea, cough, tachypnea, wheezing  GI:  No pain, No nausea, No vomiting, No diarrhea, No constipation, No weight loss, No fever, No pruritis, No rectal bleeding, No tarry stools, No dysphagia,  :  No pain, bleeding, incontinence, nocturia  Muscle:  No pain, weakness  Neuro:  No weakness, tingling, memory problems  Psych:  No fatigue, insomnia, mood problems, depression  Endocrine:  No polyuria, polydipsia, cold/heat intolerance  Heme:  No petechiae, ecchymosis, easy bruisability  Skin:  No rash, tattoos, scars, edema      PHYSICAL EXAM:   Vital Signs:  Vital Signs Last 24 Hrs  T(C): 36.7 (2023 16:09), Max: 36.8 (2023 21:00)  T(F): 98 (2023 16:09), Max: 98.2 (2023 21:00)  HR: 61 (2023 14:00) (53 - 77)  BP: 101/70 (2023 14:00) (90/68 - 117/83)  BP(mean): 80 (2023 14:00) (75 - 94)  RR: 23 (2023 14:00) (9 - 23)  SpO2: 100% (2023 14:00) (98% - 100%)    Parameters below as of 2023 14:00  Patient On (Oxygen Delivery Method): ventilator      Daily     Daily Weight in k.6 (2023 04:00)    GENERAL:  Appears stated age, well-groomed, well-nourished, no distress  HEENT:  NC/AT,  conjunctivae clear and pink, no thyromegaly, nodules, adenopathy, no JVD, sclera -anicteric  CHEST:  Full & symmetric excursion, no increased effort, breath sounds clear  HEART:  Regular rhythm, S1, S2, no murmur/rub/S3/S4, no abdominal bruit, no edema  ABDOMEN:  Soft, non-tender, non-distended, normoactive bowel sounds,  no masses ,no hepato-splenomegaly, no signs of chronic liver disease  EXTEREMITIES:  no cyanosis,clubbing or edema  SKIN:  No rash/erythema/ecchymoses/petechiae/wounds/abscess/warm/dry  NEURO:  Alert, oriented, no asterixis, no tremor, no encephalopathy    LABS:                    Imaging:

## 2023-01-02 NOTE — PROGRESS NOTE ADULT - SUBJECTIVE AND OBJECTIVE BOX
Patient is a 62y old  Male who presents with a chief complaint of resp failure (02 Jan 2023 15:55)      INTERVAL HPI/OVERNIGHT EVENTS:  events noted.     MEDICATIONS  (STANDING):  albuterol    90 MICROgram(s) HFA Inhaler 2 Puff(s) Inhalation every 6 hours  albuterol/ipratropium for Nebulization. 3 milliLiter(s) Nebulizer once  aMIOdarone    Tablet 200 milliGRAM(s) Oral daily  aspirin  chewable 81 milliGRAM(s) Oral daily  chlorhexidine 0.12% Liquid 15 milliLiter(s) Oral Mucosa every 12 hours  dexMEDEtomidine Infusion 0.2 MICROgram(s)/kG/Hr (4.08 mL/Hr) IV Continuous <Continuous>  dextrose 5%. 1000 milliLiter(s) (100 mL/Hr) IV Continuous <Continuous>  dextrose 50% Injectable 25 Gram(s) IV Push once  dextrose 50% Injectable 12.5 Gram(s) IV Push once  dextrose 50% Injectable 25 Gram(s) IV Push once  glucagon  Injectable 1 milliGRAM(s) IntraMuscular once  heparin   Injectable 5000 Unit(s) SubCutaneous every 12 hours  insulin lispro (ADMELOG) corrective regimen sliding scale   SubCutaneous every 6 hours  ipratropium 17 MICROgram(s) HFA Inhaler 1 Puff(s) Inhalation every 6 hours  lactulose Syrup 20 Gram(s) Oral two times a day  mupirocin 2% Nasal 1 Application(s) Both Nostrils two times a day  pantoprazole  Injectable 40 milliGRAM(s) IV Push daily  piperacillin/tazobactam IVPB.. 3.375 Gram(s) IV Intermittent every 12 hours  sodium chloride 0.45%. 1000 milliLiter(s) (75 mL/Hr) IV Continuous <Continuous>    MEDICATIONS  (PRN):  dextrose Oral Gel 15 Gram(s) Oral once PRN Blood Glucose LESS THAN 70 milliGRAM(s)/deciliter      Allergies  No Known Allergies    Intolerances  pork (Other)      REVIEW OF SYSTEMS:  unable to obtain    Vital Signs Last 24 Hrs  T(C): 36.7 (02 Jan 2023 16:09), Max: 36.8 (01 Jan 2023 21:00)  T(F): 98 (02 Jan 2023 16:09), Max: 98.2 (01 Jan 2023 21:00)  HR: 61 (02 Jan 2023 14:00) (53 - 77)  BP: 101/70 (02 Jan 2023 14:00) (90/68 - 117/83)  BP(mean): 80 (02 Jan 2023 14:00) (75 - 94)  RR: 23 (02 Jan 2023 14:00) (9 - 23)  SpO2: 100% (02 Jan 2023 14:00) (98% - 100%)    Parameters below as of 02 Jan 2023 14:00  Patient On (Oxygen Delivery Method): ventilator        PHYSICAL EXAM:  GENERAL: NAD  HEAD:  Atraumatic, Normocephalic  EYES: , conjunctiva and sclera clear  ENMT: intubated  NECK: Supple,   NERVOUS SYSTEM:  Awaked, moves left arm and leg.   CHEST/LUNG: Clear to auscultation bilaterally;   HEART: Regular rate and rhythm;   ABDOMEN: Soft, Nontender, Nondistended; Bowel sounds present  EXTREMITIES:  2+ Peripheral Pulses, No clubbing, cyanosis, or edema      LABS:        CAPILLARY BLOOD GLUCOSE  POCT Blood Glucose.: 115 mg/dL (02 Jan 2023 11:32)  POCT Blood Glucose.: 98 mg/dL (02 Jan 2023 05:42)  POCT Blood Glucose.: 97 mg/dL (01 Jan 2023 23:47)  POCT Blood Glucose.: 115 mg/dL (01 Jan 2023 17:07)      RADIOLOGY & ADDITIONAL TESTS:    Imaging Personally Reviewed:  [ ] YES     Consultant(s) Notes Reviewed:      Care Discussed with Consultants/Other Providers:    Advanced Directives: [ ] DNR  [ ] No feeding tube  [ ] MOLST in chart  [ ] MOLST completed today  [ ] Unknown

## 2023-01-02 NOTE — PROGRESS NOTE ADULT - SUBJECTIVE AND OBJECTIVE BOX
Patient is a 63 y/o male who presents with a chief complaint of respiratory failure (02 Jan 2023 16:27)    BRIEF HOSPITAL COURSE: ***    Events last 24 hours: ***    PAST MEDICAL & SURGICAL HISTORY:  Heart failure, systolic  CAD (coronary artery disease)  Hypertension  Nonischemic cardiomyopathy  COPD, moderate  2019 novel coronavirus disease (COVID-19)  Substance abuse  HLD (hyperlipidemia)  Cardiac LV ejection fraction 10-20%  AICD (automatic cardioverter/defibrillator) present  Cardiac LV ejection fraction 10-20%    Review of Systems:  Unable to obtain. Intubated/sedated.    Medications:  aMIOdarone    Tablet 200 milliGRAM(s) Oral daily  albuterol    90 MICROgram(s) HFA Inhaler 2 Puff(s) Inhalation every 6 hours  albuterol/ipratropium for Nebulization. 3 milliLiter(s) Nebulizer once  ipratropium 17 MICROgram(s) HFA Inhaler 1 Puff(s) Inhalation every 6 hours  dexMEDEtomidine Infusion 0.2 MICROgram(s)/kG/Hr IV Continuous <Continuous>  fentaNYL    Injectable 50 MICROGram(s) IV Push once  aspirin  chewable 81 milliGRAM(s) Oral daily  heparin   Injectable 5000 Unit(s) SubCutaneous every 12 hours  lactulose Syrup 20 Gram(s) Oral two times a day  pantoprazole  Injectable 40 milliGRAM(s) IV Push daily  dextrose 50% Injectable 25 Gram(s) IV Push once  dextrose 50% Injectable 12.5 Gram(s) IV Push once  dextrose 50% Injectable 25 Gram(s) IV Push once  dextrose Oral Gel 15 Gram(s) Oral once PRN  glucagon  Injectable 1 milliGRAM(s) IntraMuscular once  insulin lispro (ADMELOG) corrective regimen sliding scale   SubCutaneous every 6 hours  dextrose 5%. 1000 milliLiter(s) IV Continuous <Continuous>  sodium chloride 0.9%. 1000 milliLiter(s) IV Continuous <Continuous>  chlorhexidine 0.12% Liquid 15 milliLiter(s) Oral Mucosa every 12 hours    Mode: AC/ CMV (Assist Control/ Continuous Mandatory Ventilation)  RR (machine): 20  TV (machine): 400  FiO2: 30  PEEP: 5  ITime: 1  MAP: 10  PIP: 25    ICU Vital Signs Last 24 Hrs  T(C): 36.4 (02 Jan 2023 21:05), Max: 36.8 (02 Jan 2023 04:00)  T(F): 97.5 (02 Jan 2023 21:05), Max: 98.2 (02 Jan 2023 04:00)  HR: 86 (02 Jan 2023 23:00) (53 - 101)  BP: 110/79 (02 Jan 2023 23:00) (86/62 - 135/103)  BP(mean): 89 (02 Jan 2023 23:00) (71 - 112)  ABP: --  ABP(mean): --  RR: 23 (02 Jan 2023 23:00) (9 - 25)  SpO2: 97% (02 Jan 2023 23:00) (97% - 100%)    O2 Parameters below as of 02 Jan 2023 23:00  Patient On (Oxygen Delivery Method): ventilator    O2 Concentration (%): 30    I&O's Detail    01 Jan 2023 07:01  -  02 Jan 2023 07:00  --------------------------------------------------------  IN:    Dexmedetomidine: 70.9 mL    Enteral Tube Flush: 1000 mL    IV PiggyBack: 100 mL    Nepro with Carb Steady: 960 mL    sodium chloride 0.45%: 1800 mL  Total IN: 3930.9 mL    OUT:    Indwelling Catheter - Urethral (mL): 155 mL  Total OUT: 155 mL    Total NET: 3775.9 mL    02 Jan 2023 07:01  -  02 Jan 2023 23:32  --------------------------------------------------------  IN:    Dexmedetomidine: 33 mL    Enteral Tube Flush: 750 mL    Nepro with Carb Steady: 640 mL    sodium chloride 0.45%: 825 mL    sodium chloride 0.9%: 375 mL  Total IN: 2623 mL    OUT:    Indwelling Catheter - Urethral (mL): 70 mL  Total OUT: 70 mL    Total NET: 2553 mL    LABS:                        13.9   11.89 )-----------( 152      ( 02 Jan 2023 17:00 )             42.6     01-02    130<L>  |  95<L>  |  125<H>  ----------------------------<  128<H>  3.9   |  19<L>  |  5.10<H>    Ca    8.4      02 Jan 2023 17:00    TPro  6.9  /  Alb  2.3<L>  /  TBili  1.0  /  DBili  x   /  AST  59<H>  /  ALT  149<H>  /  AlkPhos  273<H>  01-02    CAPILLARY BLOOD GLUCOSE    POCT Blood Glucose.: 122 mg/dL (02 Jan 2023 17:40)    CULTURES:    Physical Examination:    General: Well appearing, lying in bed in NAD.      HEENT: Pupils equal, reactive to light. Symmetric. No scleral icterus or injection.    PULM: Clear to auscultation B/L. No wheezes, rales, or rhonchi apprecaited. No significant sputum production or increased respiratory effort.    NECK: Supple, no lymphadenopathy, trachea midline.    CVS: Regular rate and rhythm, no murmurs appreciated, +s1/s2.    ABD: Soft, nondistended, nontender, normoactive bowel sounds.    EXT: No edema, nontender.    SKIN: Warm and well perfused, no rashes noted.    NEURO: Alert, oriented, interactive, nonfocal.    RADIOLOGY:  < from: CT Chest No Cont (12.29.22 @ 13:20) >  ACC: [id number] EXAM:  CT CHEST                          PROCEDURE DATE:  12/29/2022      INTERPRETATION:  HISTORY: Admitting Dxs: J96.01 RESP DISTRESS    EXAMINATION: CT CHEST was performed without IV contrast.    COMPARISON: 12/3/2022.    FINDINGS:    AIRWAYS, LUNGS, PLEURA: Satisfactory positioning of endotracheal tube.   Mild central airways secretions. Unchanged small loculated left   pneumothorax. Small chronic loculated right pleural effusion unchanged.   Right basilar and right middlelobe atelectasis. Emphysema.    Left chest tube in place with distal tip along the medial and upper   pleural space.    MEDIASTINUM: Cardiomegaly. No pericardial effusion. Thoracic aorta normal   caliber.  No large mediastinal lymph nodes. ICD lead terminates in the   right ventricle.    IMAGED ABDOMEN: Enteric catheter terminates in the stomach.   Cholelithiasis. Decreased abdominal extraluminal gas.    SOFT TISSUES: Decreased subcutaneous soft tissue emphysema.    BONES: Unremarkable.    IMPRESSION:.    Unchanged small loculated left pneumothorax.    Unchanged small loculated and chronic right pleural effusion.    Decreased subcutaneous soft tissue emphysema and extraluminal abdominal   gas.    --- End of Report ---     MARILYN WEST MD; Attending Radiologist  This document has been electronically signed. Dec 29 2022  1:57PM    < end of copied text >    CRITICAL CARE TIME SPENT: 36 minutes Called emergently to bedside by RN for acute desaturation event (SpO2 60s). Peak pressures were in the 60s. FiO2 increased to 100%. Pt ambu bagged with improvement in SpO2. Breath sounds extremely diminished b/l. STAT CXR revealed re occurence of left-sided PTX. Chest tube was placed back on -20 suction. Pt also extremely dyssynchronous with ventilator. Precedex increased, propofol infusion initiated, and pt was administered versed 2mg IV.     CRITICAL CARE TIME SPENT: 36 MINUTES Called emergently to bedside by RN for acute desaturation event (SpO2 60s). Peak pressure was in the 60s. FiO2 increased to 100%. Pt ambu bagged with improvement in SpO2. Absent breath sounds on left, extremely diminished on right. STAT CXR revealed recurrence of left-sided PTX. Chest tube was placed back on -20 suction. Extremely dyssynchronous with ventilator. Precedex increased and propofol infusion initiated. Pt was also administered versed 2mg IV. CXR repeated, and lung did not re expand. Suspect chest tube is clogged as it has been in place for an extended period of time. Emergent pigtail catheter placed with immediate improvement in peak pressure. CXR repeated. Lung re expanded.    ASSESSMENT:  1. PTX  2. Acute hypoxic respiratory failure  3. Cardiac arrest  4. Acute renal failure  5. Transaminitis  6. Metabolic encephalopathy    CRITICAL CARE TIME SPENT: 36 MINUTES

## 2023-01-02 NOTE — PROGRESS NOTE ADULT - SUBJECTIVE AND OBJECTIVE BOX
Chief Complaint: Respiratory failure    Interval Events: No events overnight.    Review of Systems:  Unable to obtain    Physical Exam:  Vital Signs Last 24 Hrs  T(C): 36.4 (02 Jan 2023 08:25), Max: 36.8 (01 Jan 2023 21:00)  T(F): 97.5 (02 Jan 2023 08:25), Max: 98.2 (01 Jan 2023 21:00)  HR: 53 (02 Jan 2023 09:00) (53 - 94)  BP: 95/66 (02 Jan 2023 09:00) (91/79 - 125/82)  BP(mean): 76 (02 Jan 2023 09:00) (76 - 94)  RR: 14 (02 Jan 2023 09:00) (9 - 21)  SpO2: 99% (02 Jan 2023 09:00) (98% - 100%)  Parameters below as of 02 Jan 2023 09:00  Patient On (Oxygen Delivery Method): ventilator  General: Sedated  HEENT: Intubated  Neck: No JVD, no carotid bruit  Lungs: CTAB  CV: RRR, nl S1/S2, no M/R/G  Abdomen: S/NT/ND, +BS  Extremities: No LE edema, no cyanosis  Neuro: AAOx0  Skin: No rash    Labs:        ECG/Telemetry: Sinus rhythm

## 2023-01-02 NOTE — CONSULT NOTE ADULT - ASSESSMENT
resp failure  may need trach  called to see pt for peg  ngt feedings  when scheduled for trach will comsoder peg as well  ppi once a day  to follow up

## 2023-01-02 NOTE — PROVIDER CONTACT NOTE (OTHER) - BACKGROUND
intubated on vent left CT in place
Cardiac arrest x2, Dx acute respiratory failure with hypoxia, Acute kidney injury,

## 2023-01-02 NOTE — PROGRESS NOTE ADULT - SUBJECTIVE AND OBJECTIVE BOX
Patient is a 62y old  Male who presents with a chief complaint of resp failure (26 Dec 2022 09:47)    HPI: 62M CAD, Dilated cardiomyopathy, S/p AICD, Afib/flutter, h/o substance abuse, CKD baseline creat approx 1.4 last admitted to [facility] with MSSA bacteremia, and CHF.  Sent from [facility] SNF with acute hypoxic respiratory failure.  Was initially on BiPAP then became hypoxic.  Anesthesia intubated patient.  On post intubation Xray pneumothorax evident.  Patient with PEA arrest.  Chest tube placed by ED physician.  ROSC obtained.     Patient unable to give further history.      Intetrval history -     Intubated on vent.   On C pap   Opens eyes.  Makes eye contract.   Moves left hand and foot on command.    MEDICATIONS  (STANDING):    albuterol    90 MICROgram(s) HFA Inhaler 2 Puff(s) Inhalation every 6 hours  albuterol/ipratropium for Nebulization. 3 milliLiter(s) Nebulizer once  aMIOdarone    Tablet 200 milliGRAM(s) Oral daily  aspirin  chewable 81 milliGRAM(s) Oral daily  chlorhexidine 0.12% Liquid 15 milliLiter(s) Oral Mucosa every 12 hours  dexMEDEtomidine Infusion 0.2 MICROgram(s)/kG/Hr (4.08 mL/Hr) IV Continuous <Continuous>  dextrose 5%. 1000 milliLiter(s) (100 mL/Hr) IV Continuous <Continuous>  dextrose 50% Injectable 25 Gram(s) IV Push once  dextrose 50% Injectable 12.5 Gram(s) IV Push once  dextrose 50% Injectable 25 Gram(s) IV Push once  glucagon  Injectable 1 milliGRAM(s) IntraMuscular once  heparin   Injectable 5000 Unit(s) SubCutaneous every 12 hours  insulin lispro (ADMELOG) corrective regimen sliding scale   SubCutaneous every 6 hours  ipratropium 17 MICROgram(s) HFA Inhaler 1 Puff(s) Inhalation every 6 hours  lactulose Syrup 20 Gram(s) Oral two times a day  mupirocin 2% Nasal 1 Application(s) Both Nostrils two times a day  pantoprazole  Injectable 40 milliGRAM(s) IV Push daily  piperacillin/tazobactam IVPB.. 3.375 Gram(s) IV Intermittent every 12 hours  sodium chloride 0.45%. 1000 milliLiter(s) (75 mL/Hr) IV Continuous <Continuous>    MEDICATIONS  (PRN):    dextrose Oral Gel 15 Gram(s) Oral once PRN Blood Glucose LESS THAN 70 milliGRAM(s)/deciliter    Allergies    No Known Allergies    Intolerances    pork (Other)    REVIEW OF SYSTEMS: UTO    PHYSICAL EXAM:    Vital Signs Last 24 Hrs    T(C): 36.4 (31 Dec 2022 08:00), Max: 36.5 (30 Dec 2022 12:27)  T(F): 97.5 (31 Dec 2022 08:00), Max: 97.7 (30 Dec 2022 12:27)  HR: 74 (31 Dec 2022 11:00) (67 - 92)  BP: 85/64 (31 Dec 2022 11:00) (83/60 - 119/104)  BP(mean): 71 (31 Dec 2022 11:00) (69 - 111)  RR: 16 (31 Dec 2022 11:00) (13 - 23)  SpO2: 96% (31 Dec 2022 11:00) (90% - 100%)    Parameters below as of 31 Dec 2022 01:31  Patient On (Oxygen Delivery Method): ventilator,21%    Parameters below as of 30 Dec 2022 10:00  Patient On (Oxygen Delivery Method): ventilator,CPAP mode    On Neurological Examination:    Intubated on vent.  Not on any sedation.  On C pap   Pt seem to make eye contact at times.  Pupils are 3 mm, sluggishly reacting to light.  Neck is supple.  Able to move left hand and foot some what. No movements seen on right hand or foot. Right hand is swollen.   No abn body movements.    LABS:    CBC Full  -  ( 31 Dec 2022 06:32 )  WBC Count : 11.25 K/uL  RBC Count : 4.32 M/uL  Hemoglobin : 13.2 g/dL  Hematocrit : 40.4 %  Platelet Count - Automated : 102 K/uL  Mean Cell Volume : 93.5 fl  Mean Cell Hemoglobin : 30.6 pg  Mean Cell Hemoglobin Concentration : 32.7 gm/dL  Auto Neutrophil # : 9.05 K/uL  Auto Lymphocyte # : 0.61 K/uL  Auto Monocyte # : 0.87 K/uL  Auto Eosinophil # : 0.31 K/uL  Auto Basophil # : 0.04 K/uL  Auto Neutrophil % : 80.4 %  Auto Lymphocyte % : 5.4 %  Auto Monocyte % : 7.7 %  Auto Eosinophil % : 2.8 %  Auto Basophil % : 0.4 %    12-31    138  |  104  |  115<H>  ----------------------------<  165<H>  3.5   |  26  |  3.88<H>    Ca    8.4      31 Dec 2022 06:32    TPro  5.3<L>  /  Alb  1.9<L>  /  TBili  1.1  /  DBili  x   /  AST  55<H>  /  ALT  236<H>  /  AlkPhos  250<H>  12-31    RADIOLOGY & ADDITIONAL STUDIES:    r< from: CT Head No Cont (12.29.22 @ 13:20) >    IMPRESSION: No acute intracranial hemorrhage, mass effect, or shift of   the midline structures.    Similar-appearing mild chronic white matter microvascular type changes.    < end of copied text >      < from: CT Head No Cont (12.23.22 @ 21:18) >    IMPRESSION:    No large territory acute infarct, intracranial hemorrhage, or mass effect.    Slight progression of chronic microangiopathic white matter changes as compared to prior study from 2016.    < end of copied text >    < from: CT Chest No Cont (12.23.22 @ 21:19) >    IMPRESSION:    *  Left chest tube with residual small left pneumothorax. No evidence of tension.  *  Right basilar rounded atelectasis again noted with chronic small loculated right pleural effusion.  *  Small pneumomediastinum. Left chest and abdominal wall emphysema. Moderate pneumoretroperitoneum and properitoneal air. No pneumoperitoneum. Findings are compatible with barotrauma.  *  Evidence of urinary bladder cystitis.    < end of copied text >

## 2023-01-02 NOTE — PROGRESS NOTE ADULT - ASSESSMENT
REVIEW OF SYMPTOMS      Able to give (reliable) ROS  NO     PHYSICAL EXAM    HEENT Unremarkable  atraumatic   RESP Fair air entry EXP prolonged    Harsh breath sound Resp distres mild   CARDIAC S1 S2 No S3     NO JVD    ABDOMEN SOFT BS PRESENT NOT DISTENDED No hepatosplenomegaly   PEDAL EDEMA present No calf tenderness  NO rash       GENERAL DATA .   GOC.   12/23/2022 full code  ALLGY.     nka                  WT.     12/24/2022 81           BMI.       12/24/2022 26          ICU STAY. .. 12/23/2022  COVID. ..  12/23/2022 scv2 (-)   BEST PRACTICE ISSUES.    HOB ELEVATN. Yes  DVT PPLX. ..    12/23/2022 hpsc   WHITMORE PPLX. ..    12/23/2022 protonix 40   INFN PPLX. ..  12/23/2022 chlorhexidine .12%   SP SW VIVIAN.         DIET.  .. 12/26 nepro 960 ng    IV fl...1/2/2023 ns 75   PROCEDURES...   12/23/2022  l chest tube   12/23/2022 intubated   12/23/2022 reed     PAST PROCEDURES.  .  12/25/2022 glucerna 960 ng .     ABGS.  12/25/2022 ac 40% 743/44/108   12/24/2022 ac 60% 746/41/190   12/23/2022 11 a 100% 705/95/68     VS/ PO/IO/ VENT/ DRIPS.   1/2/2023 afeb 100 95/66   1/2/2023 6a dexm .02 m/k/h   1/2/2023 ac 20/4005/30     PREV ADMISSION 2/11-2/25/2022 [facility] P    AGE doa cc.  62 m doa 12/23/2022 resp distress    MAIN ISSUES.  CAC 12/23/2022 rosc 25 min   Intubation 12/23/2022  Vent mgmt 12/23/2022 -->  Weaning.  .. 12/28-1/2/2023  failed cpap    sedation.  .. dexm 12/30 -->   Pneumothorax.  .. 12/23/2022 cxr tension pntx-  .. ct 12/23 -->   Infection.  Possible aspn pneum 12/23/2022  Possible uti 12/23/2022   .. ua 12/23/2022 w 26-50   .. mrsa 12/23 (+)   .. 12/24 et pseudo  .. 12/23 uc klebsiella   .. 12/23-1/1/2023 zosyn completd   Lacticemia.  elevated lfts  .. Tredined down (anoxic hepatopathy )  DERRICK.  .. Cr 1/2/2023 Cr 5   Hypona   .. Na 1/2/2023 Na 130  Hypernatremia.  .. 12/24 - 1/2/2023 Na 147 - 130 Resolvd  Anoxic encephalo    PMH  PMH COPD  PMH COVID (+) 2/11/2022   PMH S aureus bacteremia mssa 2/11   .. Ancef 2/12/2022 Dr Phan  PMH AICD  PMH HFREF  .. ECHO 11/20/2021 ef 20%  PMH A fib (on eliquis)     HOME MEDS INCLUDE.  apixaba 5.2   sacubitril valsartan 24 26    bumetanide 2   spironolactone 25  protonix 40   amiodarone 200   .     PROBLEMS ASSESSMENT RECOMMENDATIONS.  Intubation 12/23/2022  Vent mgmnt.   .. bundle dsv dsbt ltvv pplat 30 (-) PO2 60 (+) ph 7.3 (+)  sedation.  .. 12/23/2022 fentanyl 100.4p   .. 12/26/2022 is not requiring sedation (anoxic encephalo)   .. 12/30 dexmedetomid 400 in 100   Weaning.  .. 12/28-1/2/2023  failed cpap   .. 1/2/2023 suggest trach and will continue weaning after trach  COPD.  .. 12/23/2022 combivent .4     .. 12/23/2022 solumed 40.3 -> 12/24/2022 solumed 40   .. 12/23/2022 will taper to 40 on 12/24   .. cont rx  Code 12/23/2022    .. 12/23/2022 was  restless when he came in No defib  .. 12/23/2022 coded at 12.13 p pea arrest about 25 min rosc   .. 12/23/2022 monitor neuro recovery  .. 12/24/2022 eyes open gag (+) does not follow commands   Pneumothorax.  .. 12/23/2022 cxr tension pntx  .. 12/29/2022 ct ch unchanged small loculatd l pntx   decr abd gas and sc emphysema   .. 1/1/2023 plan is to remove ct after trach or after weaning off vent   Infection.   Possible aspn pneum 12/23/2022  Possible uti 12/23/2022   .. W 12/23- 12/24-12/25-12/26-12/27-12/28/2022   w 9.6 - 21 - 20- 13 - 14 - 12   .. pr 12/24-12/25/2022 pr 31- 24     .. ua 12/23/2022 w 26-50   .. ct cap 12/23 l chest tube sm l pntx chr small loculated r pl effs mod retropneumoperitonuem cw barotrauma   .. flu ab 12/23/2022 (-)  .. rsv 12/23/2022 (-)   .. bc 12/23 (-)   .. mrsa 12/23 (+)   .. 12/24 et pseudo  .. 12/23 uc klebsiella   .. 12/23-1/1 zosyn completed    CAD.  .. Tr 12/24-12/25/2022 Tr 259 -139  .. 12/23/2022 asa 81   .. monitor   Arrhythmia.  .. 12/30 amiodarone 200  CHF.  .. bnp 12/25-12/27/2022 70k - 51k   .. ho chf   .. chf meds to be reintroduced by cardio as allowed by bp   elevated lfts.  .. LFTS 12/23-12/24-12/25-12/26-12/27-12/31/2022       - [phone]- 306- 287-250     - 0274 - 1766- 670- 212-55      - 2066 - 2405 - 1824- 1041-236   .. 12/23/2022  ct cap pneumoretroperitoneum cw barotrauma   .. 12/23/2022 check hep profile   .. 12/23/2022 follow serially  .. elevcated lfts likely sec to anoxic hepatopathy during cac   DERRICK.  .. Cr 1/2/2023 Cr 5.1  12/23-12/24-12/25-12/26-12/27-12/28-12/29-12/30-12/31/2022   Cr 1.7- 3.1- 4 - 4.5 - 4.2-4 - 3.3- 3.8 - 3.8   .. uo 12/25-12/26/2022 400  - 500    .. fluids and serial monitoring  .. 12/25/2022 renal calld   .. 12/26/2022 seen Dr Escalante feels derrick sec ATN recommended d5w  Hyponatremia.  .. Na 1/2/2023 Na 130    .. 1/2/2023 on ns   .. monitor   AMS.  .. 12/25/2022 remains unresponsive   .. 12/25/2022 Nurse tells me that no sedation is being required   .. 12/23/2022 ct head(-)   .. 12/25/2022 neuro consultd   .. 12/26/2022 pt seen by Dr Hyatt To repeat ct in 72h  .. 12/29/2022 pt is much more awake and is moving l arm    TIME SPENT   Over 39 minutes aggregate critical care time spent on encounter; activities included   direct patient care, counseling and/or coordinating care reviewing notes, lab data/ imaging , discussion with multidisciplinary team/ patient  /family and explaining in detail risks, benefits, alternatives  of the recommendations     BIPIN DE LA CRUZ 61 m 12/23/2022 1962 DR KELVIN VANESSA

## 2023-01-02 NOTE — PROGRESS NOTE ADULT - SUBJECTIVE AND OBJECTIVE BOX
DOROTHY VOSS    [facility]    Allergies    No Known Allergies    Intolerances    pork (Other)      PAST MEDICAL & SURGICAL HISTORY:  Heart failure, systolic      CAD (coronary artery disease)      Hypertension      Nonischemic cardiomyopathy      COPD, moderate      2019 novel coronavirus disease (COVID-19)      Substance abuse      HLD (hyperlipidemia)      Cardiac LV ejection fraction 10-20%      AICD (automatic cardioverter/defibrillator) present      Cardiac LV ejection fraction 10-20%          FAMILY HISTORY:  FH: lung cancer        Home Medications:  acetaminophen 325 mg oral tablet: 2 tab(s) orally every 6 hours, As needed, Temp greater or equal to 38C (100.4F), Mild Pain (1 - 3) (23 Dec 2022 10:07)  apixaban 5 mg oral tablet: 1 tab(s) orally every 12 hours (23 Dec 2022 10:07)  Aquaphor Healing topical ointment: Apply topically to affected area once a day (23 Dec 2022 10:07)  ascorbic acid 500 mg oral tablet: 1 tab(s) orally once a day (23 Dec 2022 10:07)  Bacid (LAC) oral capsule: 2 cap(s) orally once a day (23 Dec 2022 10:07)  bumetanide 2 mg oral tablet: 1 tab(s) orally 2 times a day (23 Dec 2022 10:07)  gabapentin 100 mg oral capsule: 1 cap(s) orally 3 times a day (23 Dec 2022 10:07)  melatonin 3 mg oral tablet: 1 tab(s) orally once a day (at bedtime), As needed, Insomnia (23 Dec 2022 10:07)  Multiple Vitamins with Minerals oral tablet: 1 tab(s) orally once a day (23 Dec 2022 10:07)  pantoprazole 40 mg oral delayed release tablet: 1 tab(s) orally once a day (before a meal) (23 Dec 2022 10:07)  sacubitril-valsartan 24 mg-26 mg oral tablet: 1 tab(s) orally 2 times a day (23 Dec 2022 10:07)  simethicone 80 mg oral tablet: 2 tab(s) orally every 6 hours, As Needed (23 Dec 2022 10:07)  spironolactone 25 mg oral tablet: 1 tab(s) orally once a day (23 Dec 2022 10:07)  Symbicort 160 mcg-4.5 mcg/inh inhalation aerosol: 2 puff(s) inhaled 2 times a day (23 Dec 2022 10:07)  Ventolin HFA 90 mcg/inh inhalation aerosol: 2 puff(s) inhaled every 6 hours, As Needed (23 Dec 2022 10:07)      MEDICATIONS  (STANDING):  albuterol    90 MICROgram(s) HFA Inhaler 2 Puff(s) Inhalation every 6 hours  albuterol/ipratropium for Nebulization. 3 milliLiter(s) Nebulizer once  aMIOdarone    Tablet 200 milliGRAM(s) Oral daily  aspirin  chewable 81 milliGRAM(s) Oral daily  chlorhexidine 0.12% Liquid 15 milliLiter(s) Oral Mucosa every 12 hours  dexMEDEtomidine Infusion 0.2 MICROgram(s)/kG/Hr (4.08 mL/Hr) IV Continuous <Continuous>  dextrose 5%. 1000 milliLiter(s) (100 mL/Hr) IV Continuous <Continuous>  dextrose 50% Injectable 25 Gram(s) IV Push once  dextrose 50% Injectable 12.5 Gram(s) IV Push once  dextrose 50% Injectable 25 Gram(s) IV Push once  glucagon  Injectable 1 milliGRAM(s) IntraMuscular once  heparin   Injectable 5000 Unit(s) SubCutaneous every 12 hours  insulin lispro (ADMELOG) corrective regimen sliding scale   SubCutaneous every 6 hours  ipratropium 17 MICROgram(s) HFA Inhaler 1 Puff(s) Inhalation every 6 hours  lactulose Syrup 20 Gram(s) Oral two times a day  mupirocin 2% Nasal 1 Application(s) Both Nostrils two times a day  pantoprazole  Injectable 40 milliGRAM(s) IV Push daily  piperacillin/tazobactam IVPB.. 3.375 Gram(s) IV Intermittent every 12 hours  sodium chloride 0.45%. 1000 milliLiter(s) (75 mL/Hr) IV Continuous <Continuous>    MEDICATIONS  (PRN):  dextrose Oral Gel 15 Gram(s) Oral once PRN Blood Glucose LESS THAN 70 milliGRAM(s)/deciliter      Diet, NPO with Tube Feed:   Tube Feeding Modality: Nasogastric  Nepro with Carb Steady  Total Volume for 24 Hours (mL): 960  Continuous  Starting Tube Feed Rate mL per Hour: 20  Increase Tube Feed Rate by (mL): 10     Every 4 hours  Until Goal Tube Feed Rate (mL per Hour): 40  Tube Feed Duration (in Hours): 24  Tube Feed Start Time: 13:00  Free Water Flush  Pump   Rate (mL per Hour): 30 (12-26-22 @ 23:12) [Active]          Vital Signs Last 24 Hrs  T(C): 36.4 (02 Jan 2023 08:25), Max: 36.8 (01 Jan 2023 21:00)  T(F): 97.5 (02 Jan 2023 08:25), Max: 98.2 (01 Jan 2023 21:00)  HR: 53 (02 Jan 2023 09:00) (53 - 94)  BP: 95/66 (02 Jan 2023 09:00) (91/79 - 125/82)  BP(mean): 76 (02 Jan 2023 09:00) (76 - 94)  RR: 14 (02 Jan 2023 09:00) (9 - 21)  SpO2: 99% (02 Jan 2023 09:00) (98% - 100%)    Parameters below as of 02 Jan 2023 09:00  Patient On (Oxygen Delivery Method): ventilator          01-01-23 @ 07:01  -  01-02-23 @ 07:00  --------------------------------------------------------  IN: 3930.9 mL / OUT: 155 mL / NET: 3775.9 mL        Mode: AC/ CMV (Assist Control/ Continuous Mandatory Ventilation), RR (machine): 20, TV (machine): 400, FiO2: 30, PEEP: 5, ITime: 1, MAP: 10, PIP: 24      LABS:                    WBC:  WBC Count: 11.25 K/uL (12-31 @ 06:32)  WBC Count: 13.69 K/uL (12-30 @ 16:30)  WBC Count: 11.96 K/uL (12-29 @ 12:29)      MICROBIOLOGY:  RECENT CULTURES:                  Sodium:  Sodium, Serum: 138 mmol/L (12-31 @ 06:32)  Sodium, Serum: 142 mmol/L (12-30 @ 16:30)  Sodium, Serum: 144 mmol/L (12-29 @ 12:29)      3.88 mg/dL 12-31 @ 06:32  3.85 mg/dL 12-30 @ 16:30  3.93 mg/dL 12-29 @ 12:29      Hemoglobin:  Hemoglobin: 13.2 g/dL (12-31 @ 06:32)  Hemoglobin: 13.8 g/dL (12-30 @ 16:30)  Hemoglobin: 15.5 g/dL (12-29 @ 12:29)      Platelets: Platelet Count - Automated: 102 K/uL (12-31 @ 06:32)  Platelet Count - Automated: 100 K/uL (12-30 @ 16:30)  Platelet Count - Automated: 116 K/uL (12-29 @ 12:29)              RADIOLOGY & ADDITIONAL STUDIES:      MICROBIOLOGY:  RECENT CULTURES:

## 2023-01-02 NOTE — PROGRESS NOTE ADULT - ASSESSMENT
62M CAD, Dilated cardiomyopathy, S/p AICD, Afib/flutter, h/o substance abuse, CKD baseline creat approx 1.4 last admitted to [facility] with MSSA bacteremia, and CHF.  Sent from [facility] with acute hypoxic respiratory failure/PEA arrest.  Initial CT Head - No acute pathology.  On C pap  Improvement is seen. Pt seem to make eye contact and is able to visually track at times.   Repeat CT head 12/29 - : No acute intracranial hemorrhage, mass effect, or shift of the midline structures. Similar-appearing mild chronic white matter microvascular type changes.  Able to move left hand and foot some what. No movements seen on right hand or foot. Right hand is swollen. CT Headx2 - Are neg for any acute pathology -  If weakness persists, would get MRI B rain when feasible.  D/w Dr. Awan.  Would follow.

## 2023-01-02 NOTE — PROGRESS NOTE ADULT - SUBJECTIVE AND OBJECTIVE BOX
Date/Time Patient Seen:  		  Referring MD:   Data Reviewed	       Patient is a 62y old  Male who presents with a chief complaint of resp failure (01 Jan 2023 23:18)      Subjective/HPI     PAST MEDICAL & SURGICAL HISTORY:  Heart failure, systolic    CAD (coronary artery disease)    Hypertension    Nonischemic cardiomyopathy    COPD, moderate    2019 novel coronavirus disease (COVID-19)    Substance abuse    HLD (hyperlipidemia)    Cardiac LV ejection fraction 10-20%    No significant past surgical history    AICD (automatic cardioverter/defibrillator) present    Cardiac LV ejection fraction 10-20%          Medication list         MEDICATIONS  (STANDING):  albuterol    90 MICROgram(s) HFA Inhaler 2 Puff(s) Inhalation every 6 hours  albuterol/ipratropium for Nebulization. 3 milliLiter(s) Nebulizer once  aMIOdarone    Tablet 200 milliGRAM(s) Oral daily  aspirin  chewable 81 milliGRAM(s) Oral daily  chlorhexidine 0.12% Liquid 15 milliLiter(s) Oral Mucosa every 12 hours  dexMEDEtomidine Infusion 0.2 MICROgram(s)/kG/Hr (4.08 mL/Hr) IV Continuous <Continuous>  dextrose 5%. 1000 milliLiter(s) (100 mL/Hr) IV Continuous <Continuous>  dextrose 50% Injectable 25 Gram(s) IV Push once  dextrose 50% Injectable 12.5 Gram(s) IV Push once  dextrose 50% Injectable 25 Gram(s) IV Push once  glucagon  Injectable 1 milliGRAM(s) IntraMuscular once  heparin   Injectable 5000 Unit(s) SubCutaneous every 12 hours  insulin lispro (ADMELOG) corrective regimen sliding scale   SubCutaneous every 6 hours  ipratropium 17 MICROgram(s) HFA Inhaler 1 Puff(s) Inhalation every 6 hours  lactulose Syrup 20 Gram(s) Oral two times a day  mupirocin 2% Nasal 1 Application(s) Both Nostrils two times a day  pantoprazole  Injectable 40 milliGRAM(s) IV Push daily  piperacillin/tazobactam IVPB.. 3.375 Gram(s) IV Intermittent every 12 hours  sodium chloride 0.45%. 1000 milliLiter(s) (75 mL/Hr) IV Continuous <Continuous>    MEDICATIONS  (PRN):  dextrose Oral Gel 15 Gram(s) Oral once PRN Blood Glucose LESS THAN 70 milliGRAM(s)/deciliter         Vitals log        ICU Vital Signs Last 24 Hrs  T(C): 36.8 (02 Jan 2023 04:00), Max: 36.8 (01 Jan 2023 21:00)  T(F): 98.2 (02 Jan 2023 04:00), Max: 98.2 (01 Jan 2023 21:00)  HR: 59 (02 Jan 2023 06:00) (56 - 94)  BP: 101/67 (02 Jan 2023 06:00) (91/79 - 125/82)  BP(mean): 77 (02 Jan 2023 06:00) (76 - 94)  ABP: --  ABP(mean): --  RR: 17 (02 Jan 2023 06:00) (14 - 20)  SpO2: 98% (02 Jan 2023 06:00) (98% - 100%)    O2 Parameters below as of 02 Jan 2023 06:00  Patient On (Oxygen Delivery Method): ventilator    O2 Concentration (%): 30         Mode: AC/ CMV (Assist Control/ Continuous Mandatory Ventilation)  RR (machine): 20  TV (machine): 400  FiO2: 30  PEEP: 2  ITime: 1  MAP: 11  PIP: 27      Input and Output:  I&O's Detail    31 Dec 2022 07:01  -  01 Jan 2023 07:00  --------------------------------------------------------  IN:    Dexmedetomidine: 62 mL    Enteral Tube Flush: 1000 mL    IV PiggyBack: 200 mL    Lactated Ringers Bolus: 500 mL    Nepro with Carb Steady: 960 mL    sodium chloride 0.45%: 1725 mL  Total IN: 4447 mL    OUT:    Indwelling Catheter - Urethral (mL): 520 mL  Total OUT: 520 mL    Total NET: 3927 mL      01 Jan 2023 07:01  -  02 Jan 2023 06:37  --------------------------------------------------------  IN:    Dexmedetomidine: 70.9 mL    Enteral Tube Flush: 1000 mL    IV PiggyBack: 100 mL    Nepro with Carb Steady: 960 mL    sodium chloride 0.45%: 1800 mL  Total IN: 3930.9 mL    OUT:    Indwelling Catheter - Urethral (mL): 155 mL  Total OUT: 155 mL    Total NET: 3775.9 mL          Lab Data                  Review of Systems	      Objective     Physical Examination    heart s1s2  lung dc BS  head nc      Pertinent Lab findings & Imaging      Leo:  NO   Adequate UO     I&O's Detail    31 Dec 2022 07:01  -  01 Jan 2023 07:00  --------------------------------------------------------  IN:    Dexmedetomidine: 62 mL    Enteral Tube Flush: 1000 mL    IV PiggyBack: 200 mL    Lactated Ringers Bolus: 500 mL    Nepro with Carb Steady: 960 mL    sodium chloride 0.45%: 1725 mL  Total IN: 4447 mL    OUT:    Indwelling Catheter - Urethral (mL): 520 mL  Total OUT: 520 mL    Total NET: 3927 mL      01 Jan 2023 07:01  -  02 Jan 2023 06:37  --------------------------------------------------------  IN:    Dexmedetomidine: 70.9 mL    Enteral Tube Flush: 1000 mL    IV PiggyBack: 100 mL    Nepro with Carb Steady: 960 mL    sodium chloride 0.45%: 1800 mL  Total IN: 3930.9 mL    OUT:    Indwelling Catheter - Urethral (mL): 155 mL  Total OUT: 155 mL    Total NET: 3775.9 mL               Discussed with:     Cultures:	        Radiology

## 2023-01-02 NOTE — PROGRESS NOTE ADULT - SUBJECTIVE AND OBJECTIVE BOX
[facility] DIV[facility] OF INFECTIOUS DISEASES  MARINA Mccray Y. Patel, S. Shah, G. Deni  [phone]  ([phone] - weekdays after 5pm and weekends)    Name: DOROTHY VOSS  Age/Gender: 62y Male  MRN: [id number]    Interval History:  Patient seen and examined  Remains intubated.   Notes reviewed. Afebrile   Allergies: No Known Allergies    Objective:  Vitals:   T(F): 98 (01-02-23 @ 16:09), Max: 98.2 (01-01-23 @ 21:00)  HR: 78 (01-02-23 @ 18:00) (53 - 101)  BP: 109/68 (01-02-23 @ 18:00) (90/68 - 135/103)  RR: 18 (01-02-23 @ 18:00) (9 - 25)  SpO2: 100% (01-02-23 @ 18:00) (98% - 100%)  Physical Examination:  General: no acute distress, intubated  HEENT: NC/AT, anicteric, ETT in place  Respiratory: decreased breath sounds b/l  Cardiovascular: S1 and S2 present, normal rate   Gastrointestinal: soft, nondistended  Extremities: no edema, no cyanosis  Skin: no visible rash    Laboratory Studies:  CBC:                       13.9   11.89 )-----------( 152      ( 02 Jan 2023 17:00 )             42.6     WBC Trend:  11.89 01-02-23 @ 17:00  11.25 12-31-22 @ 06:32  13.69 12-30-22 @ 16:30  11.96 12-29-22 @ 12:29  12.51 12-28-22 @ 06:40  14.34 12-27-22 @ 06:36    CMP: 01-02    130<L>  |  95<L>  |  125<H>  ----------------------------<  128<H>  3.9   |  19<L>  |  5.10<H>    Ca    8.4      02 Jan 2023 17:00    TPro  6.9  /  Alb  2.3<L>  /  TBili  1.0  /  DBili  x   /  AST  59<H>  /  ALT  149<H>  /  AlkPhos  273<H>  01-02    Creatinine, Serum: 5.10 mg/dL (01-02-23 @ 17:00)  Creatinine, Serum: 3.88 mg/dL (12-31-22 @ 06:32)  Creatinine, Serum: 3.85 mg/dL (12-30-22 @ 16:30)  Creatinine, Serum: 3.93 mg/dL (12-29-22 @ 12:29)  Creatinine, Serum: 4.04 mg/dL (12-28-22 @ 06:40)  Creatinine, Serum: 4.20 mg/dL (12-27-22 @ 06:36)    LIVER FUNCTIONS - ( 02 Jan 2023 17:00 )  Alb: 2.3 g/dL / Pro: 6.9 g/dL / ALK PHOS: 273 U/L / ALT: 149 U/L DA / AST: 59 U/L / GGT: x           Microbiology: reviewed   Culture - Sputum (collected 12-24-22 @ 17:43)  Source: ET Tube ET Tube  Gram Stain (12-25-22 @ 07:58):    Numerous polymorphonuclear leukocytes per low power field    No Squamous epithelial cells per low power field    Rare Gram Positive Rods seen per oil power field  Final Report (12-26-22 @ 16:49):    Rare Pseudomonas aeruginosa    Normal Respiratory Cathy absent  Organism: Pseudomonas aeruginosa (12-26-22 @ 16:49)  Organism: Pseudomonas aeruginosa (12-26-22 @ 16:49)      -  Amikacin: S <=16      -  Aztreonam: S 8      -  Cefepime: S <=2      -  Ceftazidime: S 4      -  Ciprofloxacin: S <=0.25      -  Gentamicin: S 4      -  Imipenem: S <=1      -  Levofloxacin: S <=0.5      -  Meropenem: S <=1      -  Piperacillin/Tazobactam: S <=8      -  Tobramycin: S <=2      Method Type: BAY    Culture - Blood (collected 12-23-22 @ 14:06)  Source: .Blood Blood-Peripheral  Final Report (12-28-22 @ 19:01):    No Growth Final    Culture - Urine (collected 12-23-22 @ 11:16)  Source: Clean Catch Clean Catch (Midstream)  Final Report (12-26-22 @ 07:02):    >100,000 CFU/ml Klebsiella oxytoca/Raoultella ornithinolytica  Organism: Klebsiella oxytoca /Raoutella ornithinolytica (12-26-22 @ 07:02)  Organism: Klebsiella oxytoca /Raoutella ornithinolytica (12-26-22 @ 07:02)      -  Amikacin: S <=16      -  Amoxicillin/Clavulanic Acid: S <=8/4      -  Ampicillin: R 16 These ampicillin results predict results for amoxicillin      -  Ampicillin/Sulbactam: S <=4/2 Enterobacter, Klebsiella aerogenes, Citrobacter, and Serratia may develop resistance during prolonged therapy (3-4 days)      -  Aztreonam: S <=4      -  Cefazolin: S <=2      -  Cefepime: S <=2      -  Cefoxitin: S <=8      -  Ceftriaxone: S <=1 Enterobacter, Klebsiella aerogenes, Citrobacter, and Serratia may develop resistance during prolonged therapy      -  Ciprofloxacin: S <=0.25      -  Ertapenem: S <=0.5      -  Gentamicin: S <=2      -  Imipenem: S <=1      -  Levofloxacin: S <=0.5      -  Meropenem: S <=1      -  Nitrofurantoin: S <=32 Should not be used to treat pyelonephritis      -  Piperacillin/Tazobactam: S <=8      -  Tobramycin: S <=2      -  Trimethoprim/Sulfamethoxazole: S <=0.5/9.5      Method Type: BAY    Culture - Blood (collected 12-23-22 @ 10:17)  Source: .Blood Blood-Peripheral  Final Report (12-28-22 @ 18:01):    No Growth Final    SARS-CoV-2 Result: NotDete (23 Dec 2022 10:16)    Radiology: reviewed     Medications:  albuterol    90 MICROgram(s) HFA Inhaler 2 Puff(s) Inhalation every 6 hours  albuterol/ipratropium for Nebulization. 3 milliLiter(s) Nebulizer once  aMIOdarone    Tablet 200 milliGRAM(s) Oral daily  aspirin  chewable 81 milliGRAM(s) Oral daily  chlorhexidine 0.12% Liquid 15 milliLiter(s) Oral Mucosa every 12 hours  dexMEDEtomidine Infusion 0.2 MICROgram(s)/kG/Hr IV Continuous <Continuous>  dextrose 5%. 1000 milliLiter(s) IV Continuous <Continuous>  dextrose 50% Injectable 25 Gram(s) IV Push once  dextrose 50% Injectable 12.5 Gram(s) IV Push once  dextrose 50% Injectable 25 Gram(s) IV Push once  dextrose Oral Gel 15 Gram(s) Oral once PRN  glucagon  Injectable 1 milliGRAM(s) IntraMuscular once  heparin   Injectable 5000 Unit(s) SubCutaneous every 12 hours  insulin lispro (ADMELOG) corrective regimen sliding scale   SubCutaneous every 6 hours  ipratropium 17 MICROgram(s) HFA Inhaler 1 Puff(s) Inhalation every 6 hours  lactulose Syrup 20 Gram(s) Oral two times a day  pantoprazole  Injectable 40 milliGRAM(s) IV Push daily  sodium chloride 0.9%. 1000 milliLiter(s) IV Continuous <Continuous>    Current Antimicrobials:    Prior/Completed Antimicrobials:  piperacillin/tazobactam IVPB..  piperacillin/tazobactam IVPB...  vancomycin  IVPB  vancomycin  IVPB  vancomycin  IVPB.

## 2023-01-03 NOTE — PROCEDURE NOTE - NSPROCDETAILS_GEN_ALL_CORE
patient pre-oxygenated, tube inserted, placement confirmed
Seldinger technique/secured in place/sterile dressing applied/percutaneous
location identified, draped/prepped, sterile technique used

## 2023-01-03 NOTE — PROGRESS NOTE ADULT - ASSESSMENT
a/p resp failure  ftt    plan  in an dout  may need peg to be placed  gerd precautions  ppi once a day    Advanced care planning was discussed with patient and family.  Advanced care planning forms were reviewed and discussed.  Risks, benefits and alternatives of gastroenterologic procedures were discussed in detail and all questions were answered.    30 minutes spent.

## 2023-01-03 NOTE — PROGRESS NOTE ADULT - SUBJECTIVE AND OBJECTIVE BOX
Date/Time Patient Seen:  		  Referring MD:   Data Reviewed	       Patient is a 62y old  Male who presents with a chief complaint of resp failure (03 Jan 2023 08:22)      Subjective/HPI     PAST MEDICAL & SURGICAL HISTORY:  Heart failure, systolic    CAD (coronary artery disease)    Hypertension    Nonischemic cardiomyopathy    COPD, moderate    2019 novel coronavirus disease (COVID-19)    Substance abuse    HLD (hyperlipidemia)    Cardiac LV ejection fraction 10-20%    No significant past surgical history    AICD (automatic cardioverter/defibrillator) present    Cardiac LV ejection fraction 10-20%          Medication list         MEDICATIONS  (STANDING):  albuterol    90 MICROgram(s) HFA Inhaler 2 Puff(s) Inhalation every 6 hours  albuterol/ipratropium for Nebulization. 3 milliLiter(s) Nebulizer once  aMIOdarone    Tablet 200 milliGRAM(s) Oral daily  aspirin  chewable 81 milliGRAM(s) Oral daily  chlorhexidine 0.12% Liquid 15 milliLiter(s) Oral Mucosa every 12 hours  dexMEDEtomidine Infusion 0.2 MICROgram(s)/kG/Hr (4.08 mL/Hr) IV Continuous <Continuous>  dextrose 5%. 1000 milliLiter(s) (100 mL/Hr) IV Continuous <Continuous>  dextrose 50% Injectable 25 Gram(s) IV Push once  dextrose 50% Injectable 12.5 Gram(s) IV Push once  dextrose 50% Injectable 25 Gram(s) IV Push once  glucagon  Injectable 1 milliGRAM(s) IntraMuscular once  heparin   Injectable 5000 Unit(s) SubCutaneous every 12 hours  insulin lispro (ADMELOG) corrective regimen sliding scale   SubCutaneous every 6 hours  ipratropium 17 MICROgram(s) HFA Inhaler 1 Puff(s) Inhalation every 6 hours  lactulose Syrup 20 Gram(s) Oral two times a day  pantoprazole  Injectable 40 milliGRAM(s) IV Push daily  sodium chloride 0.9%. 1000 milliLiter(s) (75 mL/Hr) IV Continuous <Continuous>    MEDICATIONS  (PRN):  dextrose Oral Gel 15 Gram(s) Oral once PRN Blood Glucose LESS THAN 70 milliGRAM(s)/deciliter  midazolam Injectable 2 milliGRAM(s) IV Push every 2 hours PRN Agitation         Vitals log        ICU Vital Signs Last 24 Hrs  T(C): 36.9 (03 Jan 2023 03:57), Max: 36.9 (03 Jan 2023 03:57)  T(F): 98.4 (03 Jan 2023 03:57), Max: 98.4 (03 Jan 2023 03:57)  HR: 56 (03 Jan 2023 08:21) (51 - 101)  BP: 86/63 (03 Jan 2023 07:00) (85/65 - 135/103)  BP(mean): 72 (03 Jan 2023 07:00) (71 - 112)  ABP: --  ABP(mean): --  RR: 20 (03 Jan 2023 07:00) (13 - 25)  SpO2: 98% (03 Jan 2023 08:21) (97% - 100%)    O2 Parameters below as of 03 Jan 2023 07:00  Patient On (Oxygen Delivery Method): ventilator    O2 Concentration (%): 30         Mode: AC/ CMV (Assist Control/ Continuous Mandatory Ventilation)  RR (machine): 20  TV (machine): 400  FiO2: 30  PEEP: 5  ITime: 1  MAP: 10  PIP: 24      Input and Output:  I&O's Detail    02 Jan 2023 07:01  -  03 Jan 2023 07:00  --------------------------------------------------------  IN:    Dexmedetomidine: 102.1 mL    Enteral Tube Flush: 1000 mL    IV PiggyBack: 100 mL    Nepro with Carb Steady: 920 mL    Propofol: 7.3 mL    sodium chloride 0.45%: 825 mL    sodium chloride 0.9%: 900 mL  Total IN: 3854.4 mL    OUT:    Chest Tube (mL): 30 mL    Chest Tube (mL): 40 mL    Indwelling Catheter - Urethral (mL): 120 mL  Total OUT: 190 mL    Total NET: 3664.4 mL          Lab Data                        11.2   12.21 )-----------( 141      ( 03 Jan 2023 06:00 )             34.8     01-03    132<L>  |  96  |  126<H>  ----------------------------<  129<H>  4.0   |  20<L>  |  5.75<H>    Ca    8.1<L>      03 Jan 2023 06:00    TPro  5.1<L>  /  Alb  1.9<L>  /  TBili  0.8  /  DBili  x   /  AST  44<H>  /  ALT  130<H>  /  AlkPhos  205<H>  01-03            Review of Systems	      Objective     Physical Examination    heart s1s2  lung dec BS  head nc      Pertinent Lab findings & Imaging      Leo:  NO   Adequate UO     I&O's Detail    02 Jan 2023 07:01  -  03 Jan 2023 07:00  --------------------------------------------------------  IN:    Dexmedetomidine: 102.1 mL    Enteral Tube Flush: 1000 mL    IV PiggyBack: 100 mL    Nepro with Carb Steady: 920 mL    Propofol: 7.3 mL    sodium chloride 0.45%: 825 mL    sodium chloride 0.9%: 900 mL  Total IN: 3854.4 mL    OUT:    Chest Tube (mL): 30 mL    Chest Tube (mL): 40 mL    Indwelling Catheter - Urethral (mL): 120 mL  Total OUT: 190 mL    Total NET: 3664.4 mL               Discussed with:     Cultures:	        Radiology

## 2023-01-03 NOTE — CONSULT NOTE ADULT - REASON FOR ADMISSION
resp failure

## 2023-01-03 NOTE — CONSULT NOTE ADULT - CONSULT REQUESTED DATE/TIME
23-Dec-2022 12:43
03-Jan-2023 16:47
23-Dec-2022
23-Dec-2022 14:47
23-Dec-2022 15:31
02-Jan-2023 16:27
26-Dec-2022 15:17
26-Dec-2022 15:42
30-Dec-2022 17:45

## 2023-01-03 NOTE — PROGRESS NOTE ADULT - ASSESSMENT
The patient is a 62 year old male with a history of CAD, chronic systolic heart failure s/p ICD, atrial flutter s/p DCCV, substance abuse, CKD who is admitted with respiratory failure in the setting of tension pneumothorax complicated by cardiac arrest.    Plan:  - ECG with sinus tachycardia and known LBBB  - Echo 2/22 with severely reduced LV (EF 10%) and RV function, mod MR, mod TR, mild pulm HTN  - When extubated and BP trends up, will attempt to resume metoprolol succinate. Not a candidate for ACEI/ARB/ARNI at the current time due to renal function.  - Poor renal function - hold apixaban  - Hold bumetanide and spironolactone due to DERRICK  - Continue amiodarone 200 mg daily  - Continue aspirin 81 mg daily  - BP remains on low side although MAPs >65  - Renal function continues to worsen - may need RRT - renal follow-up  - Can consider a trial of inotropes to see if it will help with renal function  - Chest tubes in place  - Mechanical ventilation  - ICU care  - Overall poor prognosis

## 2023-01-03 NOTE — CONSULT NOTE ADULT - CONSULT REQUESTED BY NAME
MD
dr tripp
julianna herrera
Dr Hawkins
Dr. Ferro
MD
Dr. Awan
Dr. Goins [facility]
Jana

## 2023-01-03 NOTE — PROGRESS NOTE ADULT - ASSESSMENT
62M CAD, Dilated cardiomyopathy, S/p AICD, Afib/flutter, h/o substance abuse,  presented with acute hypoxic and hypercarbic respiratory failure associated with pneumothorax which lled to cardiac arrest       Acute respiratory failure secondary to tension pneumothorax with leading to cardiac arrest   - continue vent management;  weaning vs. trach .  ENT aware of poss trach.   - continue chest tubes to suction for now.   - s/p Zosyn total 10 days.     hypernatremia  - improved  - cont IVF and continue to monitor    Cardiomyopathy  - current cardiac issues appear to be secondary to pulm issues at this time  - hold eliquis pending possible further procedures and hospital course    DM2  - not on meds at SNF  - FS monitoring with lispro coverage scale while critically ill    DERRICK on CKD  - Likely ATN due to above, possible HF as well  - monitor creatinine,   - avoid nephrotoxic agents  - now oliguric    Prophylactic measure  - DVT proph: heparin SQ for now  - GI proph: protonix

## 2023-01-03 NOTE — CONSULT NOTE ADULT - CONSULT REASON
patient intubated on respirator
Acute respiratory failure, chronic systolic heart failure
pntx
AHRF
GOC  resp failure
DERRICK
Nonescalation of care?
Resp failure  Hypoxic/Ischemic Encephalopathy
? peg

## 2023-01-03 NOTE — PROCEDURE NOTE - ESTIMATED BLOOD LOSS
Duramorph Follow-Up Note    1 Day Post-Op sp Procedure(s):   SECTION. /69 (BP 1 Location: Left upper arm, BP Patient Position: At rest)   Pulse 79   Temp 36.7 °C (98 °F)   Resp 16   Ht 5' 3\" (1.6 m)   Wt 78 kg (172 lb)   SpO2 98%   Breastfeeding Unknown   BMI 30.47 kg/m² . Patient is POD-1 S/P intrathecal duramorph. Pain is well controlled  Patient reports no headache, fever, weakness or numbness. Epidural/spinal tap site is clean, dry and intact. No obvious Anesthesia complications noted. Plan:    Pain management as per primary service.
None
None

## 2023-01-03 NOTE — PROVIDER CONTACT NOTE (EICU) - SITUATION
Called emergently to bedside by RN for acute desaturation event (SpO2 60s). Peak pressures were in the 60s. FiO2 increased to 100%. Pt ambu bagged with improvement in SpO2. Breath sounds extremely diminished b/l. STAT CXR revealed re occurrence of left-sided PTX. Chest tube was placed back on -20 suction. Pt also extremely dyssynchronous with ventilator. Precedex increased, propofol infusion initiated, and pt was administered versed 2mg IV.   noted tension pneumothorax.  Case discussed on camera.

## 2023-01-03 NOTE — PROGRESS NOTE ADULT - SUBJECTIVE AND OBJECTIVE BOX
D[facility]OTHY [facility]OSS    [facility]    Allergies    No Known Allergies    Intolerances    pork (Other)      PAST MEDICAL & SURGICAL HISTORY:  Heart failure, systolic      CAD (coronary artery disease)      Hypertension      Nonischemic cardiomyopathy      COPD, moderate      2019 novel coronavirus disease (COVID-19)      Substance abuse      HLD (hyperlipidemia)      Cardiac LV ejection fraction 10-20%      AICD (automatic cardioverter/defibrillator) present      Cardiac LV ejection fraction 10-20%          FAMILY HISTORY:  FH: lung cancer        Home Medications:  acetaminophen 325 mg oral tablet: 2 tab(s) orally every 6 hours, As needed, Temp greater or equal to 38C (100.4F), Mild Pain (1 - 3) (23 Dec 2022 10:07)  apixaban 5 mg oral tablet: 1 tab(s) orally every 12 hours (23 Dec 2022 10:07)  Aquaphor Healing topical ointment: Apply topically to affected area once a day (23 Dec 2022 10:07)  ascorbic acid 500 mg oral tablet: 1 tab(s) orally once a day (23 Dec 2022 10:07)  Bacid (LAC) oral capsule: 2 cap(s) orally once a day (23 Dec 2022 10:07)  bumetanide 2 mg oral tablet: 1 tab(s) orally 2 times a day (23 Dec 2022 10:07)  gabapentin 100 mg oral capsule: 1 cap(s) orally 3 times a day (23 Dec 2022 10:07)  melatonin 3 mg oral tablet: 1 tab(s) orally once a day (at bedtime), As needed, Insomnia (23 Dec 2022 10:07)  Multiple Vitamins with Minerals oral tablet: 1 tab(s) orally once a day (23 Dec 2022 10:07)  pantoprazole 40 mg oral delayed release tablet: 1 tab(s) orally once a day (before a meal) (23 Dec 2022 10:07)  sacubitril-valsartan 24 mg-26 mg oral tablet: 1 tab(s) orally 2 times a day (23 Dec 2022 10:07)  simethicone 80 mg oral tablet: 2 tab(s) orally every 6 hours, As Needed (23 Dec 2022 10:07)  spironolactone 25 mg oral tablet: 1 tab(s) orally once a day (23 Dec 2022 10:07)  Symbicort 160 mcg-4.5 mcg/inh inhalation aerosol: 2 puff(s) inhaled 2 times a day (23 Dec 2022 10:07)  Ventolin HFA 90 mcg/inh inhalation aerosol: 2 puff(s) inhaled every 6 hours, As Needed (23 Dec 2022 10:07)      MEDICATIONS  (STANDING):  albuterol    90 MICROgram(s) HFA Inhaler 2 Puff(s) Inhalation every 6 hours  albuterol/ipratropium for Nebulization. 3 milliLiter(s) Nebulizer once  aMIOdarone    Tablet 200 milliGRAM(s) Oral daily  aspirin  chewable 81 milliGRAM(s) Oral daily  chlorhexidine 0.12% Liquid 15 milliLiter(s) Oral Mucosa every 12 hours  dexMEDEtomidine Infusion 0.2 MICROgram(s)/kG/Hr (4.08 mL/Hr) IV Continuous <Continuous>  dextrose 5%. 1000 milliLiter(s) (100 mL/Hr) IV Continuous <Continuous>  dextrose 50% Injectable 25 Gram(s) IV Push once  dextrose 50% Injectable 12.5 Gram(s) IV Push once  dextrose 50% Injectable 25 Gram(s) IV Push once  glucagon  Injectable 1 milliGRAM(s) IntraMuscular once  heparin   Injectable 5000 Unit(s) SubCutaneous every 12 hours  insulin lispro (ADMELOG) corrective regimen sliding scale   SubCutaneous every 6 hours  ipratropium 17 MICROgram(s) HFA Inhaler 1 Puff(s) Inhalation every 6 hours  lactulose Syrup 20 Gram(s) Oral two times a day  pantoprazole  Injectable 40 milliGRAM(s) IV Push daily  sodium chloride 0.9%. 1000 milliLiter(s) (75 mL/Hr) IV Continuous <Continuous>    MEDICATIONS  (PRN):  dextrose Oral Gel 15 Gram(s) Oral once PRN Blood Glucose LESS THAN 70 milliGRAM(s)/deciliter  midazolam Injectable 2 milliGRAM(s) IV Push every 2 hours PRN Agitation      Diet, NPO with Tube Feed:   Tube Feeding Modality: Nasogastric  Nepro with Carb Steady  Total Volume for 24 Hours (mL): 960  Continuous  Starting Tube Feed Rate mL per Hour: 20  Increase Tube Feed Rate by (mL): 10     Every 4 hours  Until Goal Tube Feed Rate (mL per Hour): 40  Tube Feed Duration (in Hours): 24  Tube Feed Start Time: 13:00  Free Water Flush  Pump   Rate (mL per Hour): 30 (12-26-22 @ 23:12) [Active]          Vital Signs Last 24 Hrs  T(C): 36.9 (03 Jan 2023 03:57), Max: 36.9 (03 Jan 2023 03:57)  T(F): 98.4 (03 Jan 2023 03:57), Max: 98.4 (03 Jan 2023 03:57)  HR: 55 (03 Jan 2023 08:19) (51 - 101)  BP: 86/63 (03 Jan 2023 07:00) (85/65 - 135/103)  BP(mean): 72 (03 Jan 2023 07:00) (71 - 112)  RR: 20 (03 Jan 2023 07:00) (13 - 25)  SpO2: 98% (03 Jan 2023 08:19) (97% - 100%)    Parameters below as of 03 Jan 2023 07:00  Patient On (Oxygen Delivery Method): ventilator    O2 Concentration (%): 30      01-02-23 @ 07:01  -  01-03-23 @ 07:00  --------------------------------------------------------  IN: 3854.4 mL / OUT: 190 mL / NET: 3664.4 mL        Mode: AC/ CMV (Assist Control/ Continuous Mandatory Ventilation), RR (machine): 20, TV (machine): 400, FiO2: 30, PEEP: 5, ITime: 1, MAP: 10, PIP: 24      LABS:                        11.2   12.21 )-----------( 141      ( 03 Jan 2023 06:00 )             34.8     01-02    130<L>  |  95<L>  |  125<H>  ----------------------------<  128<H>  3.9   |  19<L>  |  5.10<H>    Ca    8.4      02 Jan 2023 17:00    TPro  6.9  /  Alb  2.3<L>  /  TBili  1.0  /  DBili  x   /  AST  59<H>  /  ALT  149<H>  /  AlkPhos  273<H>  01-02              WBC:  WBC Count: 12.21 K/uL (01-03 @ 06:00)  WBC Count: 11.89 K/uL (01-02 @ 17:00)  WBC Count: 11.25 K/uL (12-31 @ 06:32)  WBC Count: 13.69 K/uL (12-30 @ 16:30)      MICROBIOLOGY:  RECENT CULTURES:                  Sodium:  Sodium, Serum: 130 mmol/L (01-02 @ 17:00)  Sodium, Serum: 138 mmol/L (12-31 @ 06:32)  Sodium, Serum: 142 mmol/L (12-30 @ 16:30)      5.10 mg/dL 01-02 @ 17:00  3.88 mg/dL 12-31 @ 06:32  3.85 mg/dL 12-30 @ 16:30      Hemoglobin:  Hemoglobin: 11.2 g/dL (01-03 @ 06:00)  Hemoglobin: 13.9 g/dL (01-02 @ 17:00)  Hemoglobin: 13.2 g/dL (12-31 @ 06:32)  Hemoglobin: 13.8 g/dL (12-30 @ 16:30)      Platelets: Platelet Count - Automated: 141 K/uL (01-03 @ 06:00)  Platelet Count - Automated: 152 K/uL (01-02 @ 17:00)  Platelet Count - Automated: 102 K/uL (12-31 @ 06:32)  Platelet Count - Automated: 100 K/uL (12-30 @ 16:30)      LIVER FUNCTIONS - ( 02 Jan 2023 17:00 )  Alb: 2.3 g/dL / Pro: 6.9 g/dL / ALK PHOS: 273 U/L / ALT: 149 U/L DA / AST: 59 U/L / GGT: x                 RADIOLOGY & ADDITIONAL STUDIES:      MICROBIOLOGY:  RECENT CULTURES:

## 2023-01-03 NOTE — PROCEDURE NOTE - ADDITIONAL PROCEDURE DETAILS
procedure done independent of critical care time     Diagnosis: procedure done independent of critical care time     Diagnosis:  1. Sepsis   2. ASP PNA   3. Cardiac arrest   4. Hypoxic respiratory failure   5. L PTX   6. Metabolic encephalopathy   7. DERRICK on CKD   8. Transaminitis

## 2023-01-03 NOTE — PROGRESS NOTE ADULT - SUBJECTIVE AND OBJECTIVE BOX
DOROTHY VOSS is a 62yMale , patient examined and chart reviewed.     INTERVAL HPI/ OVERNIGHT EVENTS:   Afebrile. Vented sedated.  Left sided pneumothorax sp chest tube placement.    PAST MEDICAL & SURGICAL HISTORY:  Heart failure, systolic  CAD (coronary artery disease)  Hypertension  Nonischemic cardiomyopathy  COPD, moderate  2019 novel coronavirus disease (COVID-19)  Substance abuse  HLD (hyperlipidemia)  Cardiac LV ejection fraction 10-20%  AICD (automatic cardioverter/defibrillator) present  Cardiac LV ejection fraction 10-20%        For details regarding the patient's social history, family history, and other miscellaneous elements, please refer the initial infectious diseases consultation and/or the admitting history and physical examination for this admission.    ROS:  Unable to obtain due to : pt's condition    ALLERGIES  pork (Other)      Current inpatient medications :    ANTIBIOTICS/RELEVANT:      albuterol    90 MICROgram(s) HFA Inhaler 2 Puff(s) Inhalation every 6 hours  albuterol/ipratropium for Nebulization. 3 milliLiter(s) Nebulizer once  aMIOdarone    Tablet 200 milliGRAM(s) Oral daily  aspirin  chewable 81 milliGRAM(s) Oral daily  chlorhexidine 0.12% Liquid 15 milliLiter(s) Oral Mucosa every 12 hours  dexMEDEtomidine Infusion 0.2 MICROgram(s)/kG/Hr IV Continuous <Continuous>  dextrose 5%. 1000 milliLiter(s) IV Continuous <Continuous>  dextrose 50% Injectable 25 Gram(s) IV Push once  dextrose 50% Injectable 12.5 Gram(s) IV Push once  dextrose 50% Injectable 25 Gram(s) IV Push once  dextrose Oral Gel 15 Gram(s) Oral once PRN  DOBUTamine Infusion 5 MICROgram(s)/kG/Min IV Continuous <Continuous>  glucagon  Injectable 1 milliGRAM(s) IntraMuscular once  heparin   Injectable 5000 Unit(s) SubCutaneous every 12 hours  insulin lispro (ADMELOG) corrective regimen sliding scale   SubCutaneous every 6 hours  ipratropium 17 MICROgram(s) HFA Inhaler 1 Puff(s) Inhalation every 6 hours  lactulose Syrup 20 Gram(s) Oral two times a day  midazolam Injectable 2 milliGRAM(s) IV Push every 2 hours PRN  pantoprazole  Injectable 40 milliGRAM(s) IV Push daily  sodium chloride 0.9%. 1000 milliLiter(s) IV Continuous <Continuous>      Objective:    01-02 @ 07:01  -  01-03 @ 07:00  --------------------------------------------------------  IN: 3854.4 mL / OUT: 190 mL / NET: 3664.4 mL      T(C): 36.3 (01-03-23 @ 12:30), Max: 36.9 (01-03-23 @ 03:57)  HR: 57 (01-03-23 @ 12:21) (51 - 101)  BP: 91/66 (01-03-23 @ 08:30) (85/65 - 135/103)  RR: 16 (01-03-23 @ 08:30) (13 - 25)  SpO2: 100% (01-03-23 @ 12:21) (97% - 100%)  Mode: AC/ CMV (Assist Control/ Continuous Mandatory Ventilation), RR (machine): 20, TV (machine): 400, FiO2: 30, PEEP: 5, ITime: 1, MAP: 10, PIP: 24    Physical Exam:  General: vented sedated  Neck: supple, trachea midline  Lungs: decreased no wheeze/rhonchi Left chest tubes x 2  Cardiovascular: regular rate and rhythm, S1 S2  Abdomen: soft, nontender,  bowel sounds normal  Neurological: sedated  Skin: no rash  Extremities: +edema        LABS:                          11.2   12.21 )-----------( 141      ( 03 Jan 2023 06:00 )             34.8       01-03    132<L>  |  96  |  126<H>  ----------------------------<  129<H>  4.0   |  20<L>  |  5.75<H>    Ca    8.1<L>      03 Jan 2023 06:00    TPro  5.1<L>  /  Alb  1.9<L>  /  TBili  0.8  /  DBili  x   /  AST  44<H>  /  ALT  130<H>  /  AlkPhos  205<H>  01-03            ABG - ( 03 Jan 2023 07:30 )  pH, Arterial: 7.34  pH, Blood: x     /  pCO2: 30    /  pO2: 80    / HCO3: 16    / Base Excess: -9.6  /  SaO2: 97.3        MICROBIOLOGY:  Culture - Sputum . (12.24.22 @ 17:43)    Gram Stain:   Numerous polymorphonuclear leukocytes per low power field  No Squamous epithelial cells per low power field  Rare Gram Positive Rods seen per oil power field    -  Amikacin: S <=16    -  Aztreonam: S 8    -  Cefepime: S <=2    -  Ceftazidime: S 4    -  Ciprofloxacin: S <=0.25    -  Gentamicin: S 4    -  Imipenem: S <=1    -  Levofloxacin: S <=0.5    -  Meropenem: S <=1    -  Piperacillin/Tazobactam: S <=8    -  Tobramycin: S <=2    Specimen Source: ET Tube ET Tube    Culture Results:   Rare Pseudomonas aeruginosa  Normal Respiratory Cathy absent    Organism Identification: Pseudomonas aeruginosa    Organism: Pseudomonas aeruginosa    Method Type: BAY    Culture - Urine (12.23.22 @ 11:16)    -  Tobramycin: S <=2    -  Amoxicillin/Clavulanic Acid: S <=8/4    -  Ampicillin: R 16 These ampicillin results predict results for amoxicillin    -  Ampicillin/Sulbactam: S <=4/2 Enterobacter, Klebsiella aerogenes, Citrobacter, and Serratia may develop resistance during prolonged therapy (3-4 days)    -  Amikacin: S <=16    -  Cefazolin: S <=2    -  Cefoxitin: S <=8    -  Aztreonam: S <=4    -  Cefepime: S <=2    -  Imipenem: S <=1    -  Levofloxacin: S <=0.5    -  Ceftriaxone: S <=1 Enterobacter, Klebsiella aerogenes, Citrobacter, and Serratia may develop resistance during prolonged therapy    -  Ciprofloxacin: S <=0.25    -  Trimethoprim/Sulfamethoxazole: S <=0.5/9.5    -  Meropenem: S <=1    -  Nitrofurantoin: S <=32 Should not be used to treat pyelonephritis    -  Ertapenem: S <=0.5    -  Gentamicin: S <=2    -  Piperacillin/Tazobactam: S <=8    Specimen Source: Clean Catch Clean Catch (Midstream)    Culture Results:   >100,000 CFU/ml Klebsiella oxytoca/Raoultella ornithinolytica    Organism Identification: Klebsiella oxytoca /Raoutella ornithinolytica    Organism: Klebsiella oxytoca /Raoutella ornithinolytica    Method Type: BAY      Culture - Blood (12.23.22 @ 14:06)    Specimen Source: .Blood Blood-Peripheral    Culture Results:   No Growth Final      RADIOLOGY & ADDITIONAL STUDIES:    ACC: [id number] EXAM:  CT CHEST                          PROCEDURE DATE:  12/29/2022          INTERPRETATION:  HISTORY: Admitting Dxs: J96.01 RESP DISTRESS    EXAMINATION: CT CHEST was performed without IV contrast.    COMPARISON: 12/3/2022.    FINDINGS:    AIRWAYS, LUNGS, PLEURA: Satisfactory positioning of endotracheal tube.   Mild central airways secretions. Unchanged small loculated left   pneumothorax. Small chronic loculated right pleural effusion unchanged.   Right basilar and right middlelobe atelectasis. Emphysema.    Left chest tube in place with distal tip along the medial and upper   pleural space.    MEDIASTINUM: Cardiomegaly. No pericardial effusion. Thoracic aorta normal   caliber.  No large mediastinal lymph nodes. ICD lead terminates in the   right ventricle.    IMAGED ABDOMEN: Enteric catheter terminates in the stomach.   Cholelithiasis. Decreased abdominal extraluminal gas.    SOFT TISSUES: Decreased subcutaneous soft tissue emphysema.    BONES: Unremarkable.      IMPRESSION:.    Unchanged small loculated left pneumothorax.    Unchanged small loculated and chronic right pleural effusion.    Decreased subcutaneous soft tissue emphysema and extraluminal abdominal   gas.      Assessment :  Pt is a 62M CAD, Dilated cardiomyopathy, S/p AICD, Afib/flutter, h/o substance abuse, CKD baseline creat approx 1.4 last admitted to [facility] with MSSA bacteremia, and CHF.    Sent from [facility] with acute hypoxic respiratory failure. Was initially on BiPAP then became hypoxic.  Anesthesia intubated patient.  On post intubation Xray pneumothorax evident.  Patient with PEA arrest.  Chest tube placed by ED physician.  S/p PEA arrest  AHRF requiring intubation  Aspiration PNA   Loculated pleural effusions  - imaging reviewed  - BCx negative   - Sputum Cx -- Pseudomonas  - UCx -- Klebsiella  - WBC stable, afebrile  - s/p zosyn x10d course completed 1/1/2023  - pneumothorax    Plan:  - monitor off abx  - trend temps/WBC--stable  - maintain aspiration precautions  - vent management per ICU care  - GOC per primary team        Continue with present regiment.  Appropriate use of antibiotics and adverse effects reviewed.      > 35 minutes were spent in direct patient care reviewing notes, medications ,labs data/ imaging , discussion with multidisciplinary team.    Thank you for allowing me to participate in care of your patient .    Kelley Phan MD  Infectious Disease  [phone]

## 2023-01-03 NOTE — PROGRESS NOTE ADULT - ASSESSMENT
62M CAD, Dilated cardiomyopathy, S/p AICD, Afib/flutter, h/o substance abuse, CKD baseline creat approx 1.4 last admitted to [facility] with MSSA bacteremia, and CHF.  Sent from [facility] SNF with acute hypoxic respiratory failure/PEA arrest.  Initial CT Head - No acute pathology.  On C pap  Improvement is seen. Pt seem to make eye contact and is able to visually track at times.   Repeat CT head 12/29 - : No acute intracranial hemorrhage, mass effect, or shift of the midline structures. Similar-appearing mild chronic white matter microvascular type changes.  Able to move left hand and foot some what. No movements seen on right hand or foot. Right hand is swollen. CT Headx2 - Are neg for any acute pathology -  If weakness persists, would get MRI B rain when feasible.  Renal failure  Elevated LFTs.  Severe left ventricular systolic dysfunction. EF 10 %  Pt with multi organ failure. Overall prognosis seems very poor.  D/w palliative care attending.  Would follow.

## 2023-01-03 NOTE — PROGRESS NOTE ADULT - ASSESSMENT
REVIEW OF SYMPTOMS      Able to give (reliable) ROS  NO     PHYSICAL EXAM    HEENT Unremarkable  atraumatic   RESP Fair air entry EXP prolonged    Harsh breath sound Resp distres mild   CARDIAC S1 S2 No S3     NO JVD    ABDOMEN SOFT BS PRESENT NOT DISTENDED No hepatosplenomegaly   PEDAL EDEMA present No calf tenderness  NO rash       GENERAL DATA .   GOC.   12/23/2022 full code  ALLGY.     nka                  WT.     12/24/2022 81           BMI.       12/24/2022 26          ICU STAY. .. 12/23/2022  COVID. ..  12/23/2022 scv2 (-)   BEST PRACTICE ISSUES.    HOB ELEVATN. Yes  DVT PPLX. ..    12/23/2022 hpsc   WHITMORE PPLX. ..    12/23/2022 protonix 40   INFN PPLX. ..  12/23/2022 chlorhexidine .12%   SP SW VIVIAN.         DIET.  .. 12/26 nepro 960 ng    IV fl...1/2/2023 ns 75   PROCEDURES...   12/23/2022  l chest tube   12/23/2022 intubated   12/23/2022 reed     PAST PROCEDURES.  .  12/25/2022 glucerna 960 ng .     ABGS.  1/3/2023 30% vent 731/32/70   12/25/2022 ac 40% 743/44/108   12/24/2022 ac 60% 746/41/190   12/23/2022 11 a 100% 705/95/68     VS/ PO/IO/ VENT/ DRIPS.   1/3/2023 87 140/70   1/3/2023 4a dexm .3 m/k/h   1/3/2023 dobu   1/3/2023 ac 20/400/5/.3     PREV ADMISSION 2/11-2/25/2022 [facility] P    AGE doa cc.  62 m doa 12/23/2022 resp distress    MAIN ISSUES.  CAC 12/23/2022 rosc 25 min   Intubation 12/23/2022  Vent mgmt 12/23/2022 -->  Weaning.  .. 12/28-1/2/2023  failed cpap    sedation.  .. dexm 12/30 -->   Pneumothorax.  .. 12/23/2022 cxr tension pntx-  .. ct 12/23 -->   Hemoptysis 1/3/2023   .. 1/3/2023 brb 1 tblsp   Infection.  Possible aspn pneum 12/23/2022  Possible uti 12/23/2022   .. ua 12/23/2022 w 26-50   .. mrsa 12/23 (+)   .. 12/24 et pseudo  .. 12/23 uc klebsiella   .. 12/23-1/1/2023 zosyn completd   Lacticemia.  CHF.  ..  1/3/2023 dobu -->   elevated lfts  .. Tredined down (anoxic hepatopathy )  DERRICK.  .. Cr 1/2/2023 Cr 5   Hypona   .. Na 1/2/2023 Na 130  Hypernatremia.  .. 12/24 - 1/2/2023 Na 147 - 130 Resolvd  Anoxic encephalo    PMH  PMH COPD  PMH COVID (+) 2/11/2022   PMH S aureus bacteremia mssa 2/11   .. Ancef 2/12/2022 Dr Phan  PMH AICD  PMH HFREF  .. ECHO 11/20/2021 ef 20%  PMH A fib (on eliquis)     HOME MEDS INCLUDE.  apixaba 5.2   sacubitril valsartan 24 26    bumetanide 2   spironolactone 25  protonix 40   amiodarone 200     PROBLEMS ASSESSMENT RECOMMENDATIONS.  Intubation 12/23/2022  Vent mgmnt.   .. bundle dsv dsbt ltvv pplat 30 (-) PO2 60 (+) ph 7.3 (+)  sedation.  .. 12/23/2022 fentanyl 100.4p   .. 12/26/2022 is not requiring sedation (anoxic encephalo)   .. 12/30 dexmedetomid 400 in 100   .. 1/3/2023 midazolam 2.12p   Weaning.  .. 12/28-1/2/2023  failed cpap   .. 1/2/2023 suggest trach and will continue weaning after trach  COPD.  .. 12/23/2022 combivent .4     .. 12/23/2022 solumed 40.3 -> 12/24/2022 solumed 40   .. 12/23/2022 will taper to 40 on 12/24   .. cont rx  Hemoptysis 1/3/2023   .. 1/3/2023 brb 1 tblsp   .. 1/3/2023 7:18 PM dw Dr Awan    ........ Consider DDAVP   ........ CTS eval (she called Dr Borges)   ........ If bringing up more than 1 tbsp/h massive hemoptysis may need transfer to [facility] for [facility]   ........ Suggest culture and antibio for possible pneumonia   ........ Suggest venous duplex legs  bedside  (cannot do cta as derrick)   ........ Suggest CT ch   ........ 1/3/2023 7:35 PM dw ccpa he will check with pharmacy re correct dose of ddavp and start meropenem   Code 12/23/2022    .. 12/23/2022 was  restless when he came in No defib  .. 12/23/2022 coded at 12.13 p pea arrest about 25 min rosc   .. 12/23/2022 monitor neuro recovery  .. 12/24/2022 eyes open gag (+) does not follow commands   Pneumothorax.  .. 12/23/2022 cxr tension pntx  .. 12/29/2022 ct ch unchanged small loculatd l pntx   decr abd gas and sc emphysema   .. 1/1/2023 plan is to remove ct after trach or after weaning off vent   Infection.   Possible aspn pneum 12/23/2022  Possible uti 12/23/2022   .. W 12/23- 12/24-12/25-12/26-12/27-12/28/2022   w 9.6 - 21 - 20- 13 - 14 - 12   .. pr 12/24-12/25/2022 pr 31- 24     .. ua 12/23/2022 w 26-50   .. ct cap 12/23 l chest tube sm l pntx chr small loculated r pl effs mod retropneumoperitonuem cw barotrauma   .. flu ab 12/23/2022 (-)  .. rsv 12/23/2022 (-)   .. bc 12/23 (-)   .. mrsa 12/23 (+)   .. 12/24 et pseudo  .. 12/23 uc klebsiella   .. 12/23-1/1 zosyn completed    CAD.  .. Tr 12/24-12/25/2022 Tr 259 -139  .. 12/23/2022 asa 81   .. monitor   Arrhythmia.  .. 12/30 amiodarone 200  CHF.  .. bnp 12/25-12/27/2022 70k - 51k   .. ECHO 11/20/2021 ef 20%  .. chf meds to be reintroduced by cardio as allowed by bp  .. 1/3/2023 2p dobu 500 in 250 5 m/k/m Dr KAWN)    elevated lfts.  .. LFTS 12/23-12/24-12/25-12/26-12/27-12/31/2022       - 390-312- 306- 287-250     - 2524 - 1766- 670- 212-55      - 2066 - 2405 - 1824- 1041-236   .. 12/23/2022  ct cap pneumoretroperitoneum cw barotrauma   .. 12/23/2022 check hep profile   .. 12/23/2022 follow serially  .. elevcated lfts likely sec to anoxic hepatopathy during cac   DERIRCK.  .. Cr 1/2/2023 Cr 5.1  12/23-12/24-12/25-12/26-12/27-12/28-12/29-12/30-12/31/2022 - 1/3/2023   Cr 1.7- 3.1- 4 - 4.5 - 4.2-4 - 3.3- 3.8 - 3.8 - 5.7  .. uo 12/25-12/26/2022 400  - 500    .. fluids and serial monitoring  .. 12/25/2022 renal calld   .. 12/26/2022 seen Dr Escalante feels derrick sec ATN recommended d5w  Hyponatremia.  .. Na 1/2/2023 Na 130    .. 1/2/2023 on ns   .. monitor   AMS.  .. 12/25/2022 remains unresponsive   .. 12/25/2022 Nurse tells me that no sedation is being required   .. 12/23/2022 ct head(-)   .. 12/25/2022 neuro consultd   .. 12/26/2022 pt seen by Dr Hyatt To repeat ct in 72h  .. 12/29/2022 pt is much more awake and is moving l arm    TIME SPENT   Over 39 minutes aggregate critical care time spent on encounter; activities included   direct patient care, counseling and/or coordinating care reviewing notes, lab data/ imaging , discussion with multidisciplinary team/ patient  /family and explaining in detail risks, benefits, alternatives  of the recommendations     BIPIN DE LA CRUZ 61 m 12/23/2022 1962 DR KELVIN VANESSA

## 2023-01-03 NOTE — PROVIDER CONTACT NOTE (EICU) - BACKGROUND
62 Y M in ICU since 12/23/22 for resp failure on vent support , Left  PTX - chest tube in place.  RN concerned about  OGT position in view of difficulty flushing and aspirating. No change in O2 sat

## 2023-01-03 NOTE — PROGRESS NOTE ADULT - SUBJECTIVE AND OBJECTIVE BOX
NEPHROLOGY PROGRESS NOTE    CHIEF COMPLAINT:  DERRICK/CKD    HPI:  Required another chest tube overnight.  BP progressively lower and urine output less.  Repeat labs reflect working renal function.    EXAM:  T(F): 97.9 (01-03-23 @ 08:44)  HR: 57 (01-03-23 @ 12:21)  BP: 91/66 (01-03-23 @ 08:30)  RR: 16 (01-03-23 @ 08:30)  SpO2: 100% (01-03-23 @ 12:21)    Sedated but arouses to tactile stimuli  Normal respiratory effort, lungs clear bilaterally  Heart RRR with no murmur, generalized edema  Abdomen bladder not palpable in suprapubic fossa         LABS                             11.2   12.21 )-----------( 141      ( 03 Jan 2023 06:00 )             34.8          01-03    132<L>  |  96  |  126<H>  ----------------------------<  129<H>  4.0   |  20<L>  |  5.75<H>    Ca    8.1<L>      03 Jan 2023 06:00    TPro  5.1<L>  /  Alb  1.9<L>  /  TBili  0.8  /  DBili  x   /  AST  44<H>  /  ALT  130<H>  /  AlkPhos  205<H>  01-03    ABG 7.34 - 30 - 80 - 16           RADIOLOGY  Chest X-ray personally reviewed and shows cardiomegaly and right base infiltrate      ASSESSMENT:  1.  DERRICK in setting of progressive hypotension - ischemic and/or septic ATN - more oliguric  2.  Acute respiratory failure and PEA arrest  3.  Large left sided tension pneumothorax s/p chest tube x 2  4.  Hypernatremia -> hyponatremia, hypervolemic  5.  Trending hypotension - probably cardiogenic in nature given hx of pre existing severe systolic dysfunction and dilated CM    PLAN:  Would consider vasopressor support and r/o other causes of hypotension  Although he may need RRT in coming days his overall prognosis is quite poor and RRT very unlikely to change overall trajectory  May consider transferring to tertiary center with CRRT facility if this is within goals of care  Stop enteral water flushes

## 2023-01-03 NOTE — PROGRESS NOTE ADULT - SUBJECTIVE AND OBJECTIVE BOX
Patient is a 62y old  Male who presents with a chief complaint of resp failure (26 Dec 2022 09:47)    HPI: 62M CAD, Dilated cardiomyopathy, S/p AICD, Afib/flutter, h/o substance abuse, CKD baseline creat approx 1.4 last admitted to [facility] with MSSA bacteremia, and CHF.  Sent from [facility] with acute hypoxic respiratory failure.  Was initially on BiPAP then became hypoxic.  Anesthesia intubated patient.  On post intubation Xray pneumothorax evident.  Patient with PEA arrest.  Chest tube placed by ED physician.  ROSC obtained.     Patient unable to give further history.      Intetrval history -     Intubated on vent.   On C pap   Opens eyes.  Makes eye contract.   Moves left hand and foot on command.    MEDICATIONS  (STANDING):    albuterol    90 MICROgram(s) HFA Inhaler 2 Puff(s) Inhalation every 6 hours  albuterol/ipratropium for Nebulization. 3 milliLiter(s) Nebulizer once  aMIOdarone    Tablet 200 milliGRAM(s) Oral daily  aspirin  chewable 81 milliGRAM(s) Oral daily  chlorhexidine 0.12% Liquid 15 milliLiter(s) Oral Mucosa every 12 hours  dexMEDEtomidine Infusion 0.2 MICROgram(s)/kG/Hr (4.08 mL/Hr) IV Continuous <Continuous>  dextrose 5%. 1000 milliLiter(s) (100 mL/Hr) IV Continuous <Continuous>  dextrose 50% Injectable 25 Gram(s) IV Push once  dextrose 50% Injectable 12.5 Gram(s) IV Push once  dextrose 50% Injectable 25 Gram(s) IV Push once  DOBUTamine Infusion 5 MICROgram(s)/kG/Min (12.2 mL/Hr) IV Continuous <Continuous>  glucagon  Injectable 1 milliGRAM(s) IntraMuscular once  heparin   Injectable 5000 Unit(s) SubCutaneous every 12 hours  insulin lispro (ADMELOG) corrective regimen sliding scale   SubCutaneous every 6 hours  ipratropium 17 MICROgram(s) HFA Inhaler 1 Puff(s) Inhalation every 6 hours  lactulose Syrup 20 Gram(s) Oral two times a day  pantoprazole  Injectable 40 milliGRAM(s) IV Push daily  sodium chloride 0.9%. 1000 milliLiter(s) (75 mL/Hr) IV Continuous <Continuous>    MEDICATIONS  (PRN):    dextrose Oral Gel 15 Gram(s) Oral once PRN Blood Glucose LESS THAN 70 milliGRAM(s)/deciliter  fentaNYL    Injectable 25 MICROGram(s) IV Push every 2 hours PRN pain or vent synchrony  midazolam Injectable 2 milliGRAM(s) IV Push every 2 hours PRN Agitation    Allergies    No Known Allergies    Intolerances    pork (Other)    REVIEW OF SYSTEMS: UTO    PHYSICAL EXAM:    Vital Signs Last 24 Hrs    T(C): 36.3 (03 Jan 2023 12:30), Max: 36.9 (03 Jan 2023 03:57)  T(F): 97.3 (03 Jan 2023 12:30), Max: 98.4 (03 Jan 2023 03:57)  HR: 96 (03 Jan 2023 16:46) (51 - 96)  BP: 138/75 (03 Jan 2023 16:30) (85/65 - 157/90)  BP(mean): 94 (03 Jan 2023 16:30) (71 - 110)  RR: 17 (03 Jan 2023 16:30) (13 - 30)  SpO2: 100% (03 Jan 2023 16:46) (89% - 100%)    Parameters below as of 03 Jan 2023 16:30  Patient On (Oxygen Delivery Method): ventilator    On Neurological Examination:    Intubated on vent.  Not on any sedation.  On C pap   Pt seem to make eye contact at times.  Pupils are 3 mm, sluggishly reacting to light.  Neck is supple.  Able to move left hand and foot some what. No movements seen on right hand or foot. Right hand is swollen.   No abn body movements.    LABS:    CBC Full  -  ( 03 Jan 2023 06:00 )  WBC Count : 12.21 K/uL  RBC Count : 3.75 M/uL  Hemoglobin : 11.2 g/dL  Hematocrit : 34.8 %  Platelet Count - Automated : 141 K/uL  Mean Cell Volume : 92.8 fl  Mean Cell Hemoglobin : 29.9 pg  Mean Cell Hemoglobin Concentration : 32.2 gm/dL    01-03    132<L>  |  96  |  126<H>  ----------------------------<  129<H>  4.0   |  20<L>  |  5.75<H>    Ca    8.1<L>      03 Jan 2023 06:00    TPro  5.1<L>  /  Alb  1.9<L>  /  TBili  0.8  /  DBili  x   /  AST  44<H>  /  ALT  130<H>  /  AlkPhos  205<H>  01-03    RADIOLOGY & ADDITIONAL STUDIES:    < from: TTE Echo Complete w/o Contrast w/ Doppler (02.13.22 @ 09:00) >    1. Severe left ventricular systolic dysfunction. EF 10 %  2. Dilated left ventricle, left atrium, right atrium.  3. Moderate mitral regurgitation.  4. Moderate tricuspid regurgitation.  5. Mild pulmonary hypertension.  6. No evidence of endocarditis on this transthoracic study.    < end of copied text >    r< from: CT Head No Cont (12.29.22 @ 13:20) >    IMPRESSION: No acute intracranial hemorrhage, mass effect, or shift of the midline structures.    Similar-appearing mild chronic white matter microvascular type changes.    < end of copied text >    < from: CT Head No Cont (12.23.22 @ 21:18) >    IMPRESSION:    No large territory acute infarct, intracranial hemorrhage, or mass effect.    Slight progression of chronic microangiopathic white matter changes as compared to prior study from 2016.    < end of copied text >    < from: CT Chest No Cont (12.23.22 @ 21:19) >    IMPRESSION:    *  Left chest tube with residual small left pneumothorax. No evidence of tension.  *  Right basilar rounded atelectasis again noted with chronic small loculated right pleural effusion.  *  Small pneumomediastinum. Left chest and abdominal wall emphysema. Moderate pneumoretroperitoneum and properitoneal air. No pneumoperitoneum. Findings are compatible with barotrauma.  *  Evidence of urinary bladder cystitis.    < end of copied text >

## 2023-01-03 NOTE — CONSULT NOTE ADULT - SUBJECTIVE AND OBJECTIVE BOX
Ethics consultation:  Asked by Dr. Loo to evaluate this patient for DNR and nonescalation of care.       INTERVAL HPI/OVERNIGHT EVENTS:    62y MaleHPI:  62M CAD, Dilated cardiomyopathy, S/p AICD, Afib/flutter, h/o substance abuse, CKD baseline creat approx 1.4 last admitted to [facility] with MSSA bacteremia, and CHF.  Sent from [facility] SNF with acute hypoxic respiratory failure.  Was initially on BiPAP then became hypoxic.  Anesthesia intubated patient.  On post intubation Xray pneumothorax evident.  Patient with PEA arrest.  Chest tube placed by ED physician.  ROSC obtained.     Patient unable to give further history.     (23 Dec 2022 13:02)  Pt. apparently has little hope of meaningful recovery. Has been developing progressive renal failure, unlikely to resolve without hemodialysis.      PAST MEDICAL & SURGICAL HISTORY:  Heart failure, systolic      CAD (coronary artery disease)      Hypertension      Nonischemic cardiomyopathy      COPD, moderate      2019 novel coronavirus disease (COVID-19)      Substance abuse      HLD (hyperlipidemia)      Cardiac LV ejection fraction 10-20%      AICD (automatic cardioverter/defibrillator) present      Cardiac LV ejection fraction 10-20%            ICU Vital Signs Last 24 Hrs  T(C): 36.3 (03 Jan 2023 12:30), Max: 36.9 (03 Jan 2023 03:57)  T(F): 97.3 (03 Jan 2023 12:30), Max: 98.4 (03 Jan 2023 03:57)  HR: 84 (03 Jan 2023 16:30) (51 - 97)  BP: 138/75 (03 Jan 2023 16:30) (85/65 - 157/90)  BP(mean): 94 (03 Jan 2023 16:30) (71 - 112)  ABP: --  ABP(mean): --  RR: 17 (03 Jan 2023 16:30) (13 - 30)  SpO2: 100% (03 Jan 2023 16:30) (89% - 100%)    O2 Parameters below as of 03 Jan 2023 16:30  Patient On (Oxygen Delivery Method): ventilator          Qtts:           LABS:                        11.2   12.21 )-----------( 141      ( 03 Jan 2023 06:00 )             34.8     01-03    132<L>  |  96  |  126<H>  ----------------------------<  129<H>  4.0   |  20<L>  |  5.75<H>    Ca    8.1<L>      03 Jan 2023 06:00    TPro  5.1<L>  /  Alb  1.9<L>  /  TBili  0.8  /  DBili  x   /  AST  44<H>  /  ALT  130<H>  /  AlkPhos  205<H>  01-03        ABG - ( 03 Jan 2023 16:03 )  pH, Arterial: 7.31  pH, Blood: x     /  pCO2: 32    /  pO2: 70    / HCO3: 16    / Base Excess: -10.2 /  SaO2: 95.3              vanco through     RADIOLOGY & ADDITIONAL STUDIES:      CRITICAL CARE TIME SPENT:

## 2023-01-03 NOTE — PROVIDER CONTACT NOTE (EICU) - SITUATION
Bedside   ICu Hospitalis and RN E Leti re pt's new onset of bloody drainage via ETT. O2 sat still 100% on 40 % FiO2 CMV.

## 2023-01-03 NOTE — CONSULT NOTE ADULT - ASSESSMENT
This patient sustained anoxic encephalopathy and progressive DERRICK following arrest.   He has poor mental status with little chance of meaningful recovery.  Escalation of care including hemodialysis or CRRT would be unlikely to change his overall poor prognosis, and therefore patient should be DNR, with no escalation of care, and no hemodialysis.  Discussed with Dr. Loo in detail. This patient sustained anoxic encephalopathy and progressive DERRICK following arrest.   He has poor mental status with little chance of meaningful recovery.  Escalation of care including hemodialysis or CRRT , tracheostomy and PEG would be unlikely to change his overall poor prognosis, and therefore patient should be DNR, with no escalation of care, and no hemodialysis.  Would consider comfort care at this time.   Discussed with Dr. Loo in detail.

## 2023-01-03 NOTE — PROGRESS NOTE ADULT - SUBJECTIVE AND OBJECTIVE BOX
INTERVAL HPI/OVERNIGHT EVENTS:  No new overnight event.  No N/V/D.  Tolerating diet via tube    Allergies    No Known Allergies    Intolerances    pork (Other)    General:  No wt loss, fevers, chills, night sweats, fatigue,   Eyes:  Good vision, no reported pain  ENT:  No sore throat, pain, runny nose, dysphagia  CV:  No pain, palpitations, hypo/hypertension  Resp:  No dyspnea, cough, tachypnea, wheezing  GI:  No pain, No nausea, No vomiting, No diarrhea, No constipation, No weight loss, No fever, No pruritis, No rectal bleeding, No tarry stools, No dysphagia,  :  No pain, bleeding, incontinence, nocturia  Muscle:  No pain, weakness  Neuro:  No weakness, tingling, memory problems  Psych:  No fatigue, insomnia, mood problems, depression  Endocrine:  No polyuria, polydipsia, cold/heat intolerance  Heme:  No petechiae, ecchymosis, easy bruisability  Skin:  No rash, tattoos, scars, edema      PHYSICAL EXAM:   Vital Signs:  Vital Signs Last 24 Hrs  T(C): 36.3 (2023 12:30), Max: 36.9 (2023 03:57)  T(F): 97.3 (2023 12:30), Max: 98.4 (2023 03:57)  HR: 57 (2023 12:21) (51 - 97)  BP: 91/66 (2023 08:30) (85/65 - 135/103)  BP(mean): 75 (2023 08:30) (71 - 112)  RR: 16 (2023 08:30) (13 - 25)  SpO2: 100% (2023 12:21) (97% - 100%)    Parameters below as of 2023 08:30  Patient On (Oxygen Delivery Method): ventilator      Daily     Daily Weight in k.3 (2023 03:57)I&O's Summary    2023 07:01  -  2023 07:00  --------------------------------------------------------  IN: 3854.4 mL / OUT: 190 mL / NET: 3664.4 mL        GENERAL:  Appears stated age, well-groomed, well-nourished, no distress  HEENT:  NC/AT,  conjunctivae clear and pink, no thyromegaly, nodules, adenopathy, no JVD, sclera -anicteric  CHEST:  Full & symmetric excursion, no increased effort, breath sounds clear  HEART:  Regular rhythm, S1, S2, no murmur/rub/S3/S4, no abdominal bruit, no edema  ABDOMEN:  Soft, non-tender, non-distended, normoactive bowel sounds,  no masses ,no hepato-splenomegaly, no signs of chronic liver disease  EXTEREMITIES:  no cyanosis,clubbing or edema  SKIN:  No rash/erythema/ecchymoses/petechiae/wounds/abscess/warm/dry  NEURO:  Alert, oriented, no asterixis, no tremor, no encephalopathy      LABS:                        11.2   12.21 )-----------( 141      ( 2023 06:00 )             34.8     03    132<L>  |  96  |  126<H>  ----------------------------<  129<H>  4.0   |  20<L>  |  5.75<H>    Ca    8.1<L>      2023 06:00    TPro  5.1<L>  /  Alb  1.9<L>  /  TBili  0.8  /  DBili  x   /  AST  44<H>  /  ALT  130<H>  /  AlkPhos  205<H>  03        amylase   lipase  RADIOLOGY & ADDITIONAL TESTS:

## 2023-01-03 NOTE — PROGRESS NOTE ADULT - ASSESSMENT
Pt is a 61 y/o M pmhx of HTN, dilated cardiomyopathy s/p AICD, CKD, opoid/substance abuse, afib/aflutter who presented to  ED w/ complaints of acute hypoxic respiratory failure requiring intubation. Complicated by L sided PTX following intubation, followed by PEA cardiac arrest, chest tube placed emergently in ED w/ ROSC, admitted to SPCU.     1. Sepsis   2. ASP PNA   3. Cardiac arrest   4. Hypoxic respiratory failure   5. L PTX   6. Metabolic encephalopathy   7. DERRICK on CKD   8. Transaminitis     Plan  Neuro: precedex infusion to facilitate safe ventilation. prn versed and fentanyl as needed. suspect degrees of anoxia injury?   Cardio: dobutamine at 5. Attempting to see if any progress with urine output. Keep MAP above 65 . cont PO amio   Pulm: remains intubated on full vent support. 40% fi02 peep of 5. cxr stable with 2 L sided chest tubes to suction.   GI: NPO, PPI   Renal: ARF likely progressing to ESRD. May need dialysis in next 24 hours   ID: restart meropenum for possible loabr pna   Heme: d/c sq heparin. WIll administer DDAVP given uremic bleeding. No suctioning unless absolutely necessary   Endo: ISS q6     Dispo: Remains in SPCU, case d/w eICU. Given hemoptysis slowing, normal cbc and coags and DDAVP just given will watch pt overnight . Any worsening signs or hemodynamics will initiate transfer to [facility]. Case also d/w pulmonologist Dr. Goins

## 2023-01-03 NOTE — PROGRESS NOTE ADULT - SUBJECTIVE AND OBJECTIVE BOX
Patient is a 62y old  Male who presents with a chief complaint of resp failure (03 Jan 2023 17:29)      BRIEF HOSPITAL COURSE:   Pt is a 63 y/o M pmhx of HTN, dilated cardiomyopathy s/p AICD, CKD, opoid/substance abuse, afib/aflutter who presented to  ED w/ complaints of acute hypoxic respiratory failure requiring intubation. Complicated by L sided PTX following intubation, followed by PEA cardiac arrest, chest tube placed emergently in ED w/ ROSC, admitted to [facility]. Course further complicated by ASP PNA, shock and DERRICK on CKD. LAst night had recurent L sided PTX requiring emergent chest tube to be placed  tonight with hemoptysis   gave DDAVP  stop IV fluid   PIV placed for better IV access       PAST MEDICAL & SURGICAL HISTORY:  Heart failure, systolic      CAD (coronary artery disease)      Hypertension      Nonischemic cardiomyopathy      COPD, moderate      2019 novel coronavirus disease (COVID-19)      Substance abuse      HLD (hyperlipidemia)      Cardiac LV ejection fraction 10-20%      AICD (automatic cardioverter/defibrillator) present      Cardiac LV ejection fraction 10-20%        Allergies    No Known Allergies    Intolerances    pork (Other)    FAMILY HISTORY:  FH: lung cancer        Review of Systems:  pt intubated unable to assess       Medications:  meropenem  IVPB 500 milliGRAM(s) IV Intermittent every 12 hours    aMIOdarone    Tablet 200 milliGRAM(s) Oral daily  DOBUTamine Infusion 5 MICROgram(s)/kG/Min IV Continuous <Continuous>    albuterol    90 MICROgram(s) HFA Inhaler 2 Puff(s) Inhalation every 6 hours  albuterol/ipratropium for Nebulization. 3 milliLiter(s) Nebulizer once  ipratropium 17 MICROgram(s) HFA Inhaler 1 Puff(s) Inhalation every 6 hours    dexMEDEtomidine Infusion 0.2 MICROgram(s)/kG/Hr IV Continuous <Continuous>  fentaNYL    Injectable 25 MICROGram(s) IV Push every 2 hours PRN  midazolam Injectable 2 milliGRAM(s) IV Push every 2 hours PRN      aspirin  chewable 81 milliGRAM(s) Oral daily    lactulose Syrup 20 Gram(s) Oral two times a day  pantoprazole  Injectable 40 milliGRAM(s) IV Push daily      dextrose 50% Injectable 25 Gram(s) IV Push once  dextrose 50% Injectable 12.5 Gram(s) IV Push once  dextrose 50% Injectable 25 Gram(s) IV Push once  dextrose Oral Gel 15 Gram(s) Oral once PRN  glucagon  Injectable 1 milliGRAM(s) IntraMuscular once  insulin lispro (ADMELOG) corrective regimen sliding scale   SubCutaneous every 6 hours    dextrose 5%. 1000 milliLiter(s) IV Continuous <Continuous>      chlorhexidine 0.12% Liquid 15 milliLiter(s) Oral Mucosa every 12 hours        Mode: AC/ CMV (Assist Control/ Continuous Mandatory Ventilation)  RR (machine): 20  TV (machine): 400  FiO2: 40  PEEP: 5  ITime: 1  MAP: 12  PIP: 29      ICU Vital Signs Last 24 Hrs  T(C): 36.3 (03 Jan 2023 12:30), Max: 36.9 (03 Jan 2023 03:57)  T(F): 97.3 (03 Jan 2023 12:30), Max: 98.4 (03 Jan 2023 03:57)  HR: 81 (03 Jan 2023 21:00) (51 - 96)  BP: 148/77 (03 Jan 2023 18:00) (85/65 - 157/90)  BP(mean): 95 (03 Jan 2023 18:00) (72 - 110)  ABP: --  ABP(mean): --  RR: 22 (03 Jan 2023 18:00) (13 - 30)  SpO2: 99% (03 Jan 2023 21:00) (89% - 100%)    O2 Parameters below as of 03 Jan 2023 18:00  Patient On (Oxygen Delivery Method): ventilator          Vital Signs Last 24 Hrs  T(C): 36.3 (03 Jan 2023 12:30), Max: 36.9 (03 Jan 2023 03:57)  T(F): 97.3 (03 Jan 2023 12:30), Max: 98.4 (03 Jan 2023 03:57)  HR: 81 (03 Jan 2023 21:00) (51 - 96)  BP: 148/77 (03 Jan 2023 18:00) (85/65 - 157/90)  BP(mean): 95 (03 Jan 2023 18:00) (72 - 110)  RR: 22 (03 Jan 2023 18:00) (13 - 30)  SpO2: 99% (03 Jan 2023 21:00) (89% - 100%)    Parameters below as of 03 Jan 2023 18:00  Patient On (Oxygen Delivery Method): ventilator        ABG - ( 03 Jan 2023 16:03 )  pH, Arterial: 7.31  pH, Blood: x     /  pCO2: 32    /  pO2: 70    / HCO3: 16    / Base Excess: -10.2 /  SaO2: 95.3                I&O's Detail    02 Jan 2023 07:01  -  03 Jan 2023 07:00  --------------------------------------------------------  IN:    Dexmedetomidine: 102.1 mL    Enteral Tube Flush: 1000 mL    IV PiggyBack: 100 mL    Nepro with Carb Steady: 920 mL    Propofol: 7.3 mL    sodium chloride 0.45%: 825 mL    sodium chloride 0.9%: 900 mL  Total IN: 3854.4 mL    OUT:    Chest Tube (mL): 30 mL    Chest Tube (mL): 40 mL    Indwelling Catheter - Urethral (mL): 120 mL  Total OUT: 190 mL    Total NET: 3664.4 mL      03 Jan 2023 07:01  -  03 Jan 2023 21:56  --------------------------------------------------------  IN:    Dexmedetomidine: 42 mL    DOBUTamine: 48.8 mL    sodium chloride 0.9%: 150 mL  Total IN: 240.8 mL    OUT:    Chest Tube (mL): 0 mL    Chest Tube (mL): 0 mL  Total OUT: 0 mL    Total NET: 240.8 mL            LABS:                        11.6   13.61 )-----------( 157      ( 03 Jan 2023 18:54 )             34.5     01-03    132<L>  |  96  |  126<H>  ----------------------------<  129<H>  4.0   |  20<L>  |  5.75<H>    Ca    8.1<L>      03 Jan 2023 06:00    TPro  5.1<L>  /  Alb  1.9<L>  /  TBili  0.8  /  DBili  x   /  AST  44<H>  /  ALT  130<H>  /  AlkPhos  205<H>  01-03          CAPILLARY BLOOD GLUCOSE      POCT Blood Glucose.: 100 mg/dL (03 Jan 2023 18:45)    PT/INR - ( 03 Jan 2023 18:54 )   PT: 11.2 sec;   INR: 0.97 ratio         PTT - ( 03 Jan 2023 18:54 )  PTT:32.2 sec    CULTURES:      Physical Examination:    General: elderly male in bed, sedated     HEENT: Pupils equal, reactive to light.  Symmetric. blood clots in line suction    PULM: corse breath sounds b/l     CVS: Regular rate and rhythm, no murmurs, rubs, or gallops    ABD: Soft, nondistended, nontender, normoactive bowel sounds, no masses    EXT: +edema, nontender    SKIN: Warm     RADIOLOGY:   < from: Xray Chest 1 View- PORTABLE-Urgent (Xray Chest 1 View- PORTABLE-Urgent .) (01.03.23 @ 01:53) >  INTERPRETATION:  TIME OF EXAM: January 2, 2023 at 11:46 PM and 11:47 PM.    CLINICAL INFORMATION: Status post cardiac arrest. Respiratory distress   and abnormal chest sounds.    COMPARISON:  CT scan of the chest from December 29, 2022.    TECHNIQUE:   AP Portable chest x-ray.    INTERPRETATION:    The cardiac silhouette is enlarged. There is a left chest wall ICD with   intact lead with tip in expected region of the right ventricle. The   generator obscures part of the left lung.  The mediastinum is not accurately evaluated on these images.  ET tube tip is approximately 7.4 cm above the jennie.  Enteric tube tip is near the GE junction with side port in the distal   esophagus.  Left chest tube not significantly changed in position.  No significant change in small loculated right pleural effusion and right   mid and lower lung opacities. Question small pneumothorax associated with   the pleural effusion versus interposed aerated lung. Question tiny right   apical pneumothorax versus apical bulla.  Large left pneumothorax, increased in size. Concern for tension as there   is inversion of the left hemidiaphragm and widening of the rib   interspaces on the left compared to the right. Atelectasis of underlying   lung.  No acute bony abnormality.    AP portable chest x-ray from January 3, 2023 at 12:05 AM and 12:06 AM:    CLINICAL INFORMATION: Pneumothorax.    INTERPRETATION:    ET tube tip is 5.7 cm above the jennie. Enteric tube and left chest tube   unchanged in position.  Large left pneumothorax with concerns for tension, not significantly   changed.  Right-sided findings not significantly changed.    AP portable chest x-ray from January 3, 2023 at 12:53 AM and 12:54 AM:    CLINICAL INFORMATION: Replaced left chest tube.    INTERPRETATION:    ET tube tip is 6.2 cm above the jennie.  Enteric tube tip near GE junction with side port in the distal esophagus.  Left chest tube with tip projecting over the aortic knob.  Large left pneumothorax with concerns for tension, not significantly   changed. Continued atelectasis of underlying lung.  New left subcutaneous emphysema.  Small loculated right pleural effusion with likely associated passive   atelectasis is not significantly changed. Once again a small pneumothorax   component is not excluded. In addition, continued question of minimal   right apical pneumothorax versus apical bulla.    AP portable chest x-ray from January 3, 2023 at 1:35 AM:    CLINICAL INFORMATION: Chest tube.    INTERPRETATION:    ET tube tip approximately 4 cm above the jennie.  Enteric tube tip is likely in the stomach with the side port near the GE   junction.  Left chest wall ICD again seen.  Small loculated right pleural effusion with right mid and lower lung   opacities, not significantly changed. Previous concern for small   associated pneumothorax not seen. Question tiny right apical pneumothorax   versus apical bulla, unchanged.  Left chest tube unchanged in position. Left pneumothorax has resolved.   Left subcutaneous emphysema is slightly decreased. No left pleural   effusion.        IMPRESSION:  Left chest tube unchanged in position on the most recent   image with resolution of left pneumothorax. Slight decrease in left   subcutaneous emphysema.    Enteric tube tip likely in the stomach with side port near the GE   junction. Consider advancing the enteric tube. Discussed with Dr. Dobbins   with read back at 9:03 AM on January 3, 2023 by Dr. Sarmiento.    Small loculated right pleural effusion with right mid and lower lung   opacities, possibly atelectasis, although infection not excluded, not   significantly changed.    < end of copied text >      Critical Care time: 45 mins assessing presenting problems of acute illness that poses high probability of life threatening deterioration or end organ damage/dysfunction.  Medical decision making inculding Initiating plan of care, reviewing data, reviewing radiology,direct patient bedside evaluation and interpretation of vital signs, any necessary ventilator management , discusion with multidisciplinary team, discussing goals of care with patient/family, all non inclusive of procedures

## 2023-01-03 NOTE — CONSULT NOTE ADULT - PROVIDER SPECIALTY LIST ADULT
Nephrology
Neurology
Cardiology
Critical Care
ENT
Gastroenterology
Ethics
Infectious Disease
Palliative Care

## 2023-01-03 NOTE — PROGRESS NOTE ADULT - SUBJECTIVE AND OBJECTIVE BOX
Chief Complaint: Respiratory failure    Interval Events: No events overnight.    Review of Systems:  Unable to obtain    Physical Exam:  Vital Signs Last 24 Hrs  T(C): 36.3 (03 Jan 2023 12:30), Max: 36.9 (03 Jan 2023 03:57)  T(F): 97.3 (03 Jan 2023 12:30), Max: 98.4 (03 Jan 2023 03:57)  HR: 57 (03 Jan 2023 12:21) (51 - 101)  BP: 91/66 (03 Jan 2023 08:30) (85/65 - 135/103)  BP(mean): 75 (03 Jan 2023 08:30) (71 - 112)  RR: 16 (03 Jan 2023 08:30) (13 - 25)  SpO2: 100% (03 Jan 2023 12:21) (97% - 100%)  Parameters below as of 03 Jan 2023 08:30  Patient On (Oxygen Delivery Method): ventilator  General: Sedated  HEENT: Intubated  Neck: No JVD, no carotid bruit  Lungs: CTAB  CV: RRR, nl S1/S2, no M/R/G  Abdomen: S/NT/ND, +BS  Extremities: No LE edema, no cyanosis  Neuro: AAOx0  Skin: No rash    Labs:    01-03    132<L>  |  96  |  126<H>  ----------------------------<  129<H>  4.0   |  20<L>  |  5.75<H>    Ca    8.1<L>      03 Jan 2023 06:00    TPro  5.1<L>  /  Alb  1.9<L>  /  TBili  0.8  /  DBili  x   /  AST  44<H>  /  ALT  130<H>  /  AlkPhos  205<H>  01-03                        11.2   12.21 )-----------( 141      ( 03 Jan 2023 06:00 )             34.8         ECG/Telemetry: Sinus rhythm

## 2023-01-03 NOTE — PROGRESS NOTE ADULT - ASSESSMENT
62M CAD, Dilated cardiomyopathy, S/p AICD, Afib/flutter, h/o substance abuse,  presented with acute hypoxic and hypercarbic respiratory failure associated with pneumothorax.      ptx  resp failure  arnoldo hx  CM  AICD  AF    ct inserted for tension PTX  vs noted  labs reviewed  GI cx noted    no immediate family  friends only on listed number  no decision making capacity from friends  may need guardianship and decision from 2 MD's

## 2023-01-03 NOTE — PROGRESS NOTE ADULT - SUBJECTIVE AND OBJECTIVE BOX
Patient is a 62y old  Male who presents with a chief complaint of resp failure (03 Jan 2023 08:34)      INTERVAL HPI/OVERNIGHT EVENTS: overnight events noted.  pt now with second chest tube.     MEDICATIONS  (STANDING):  albuterol    90 MICROgram(s) HFA Inhaler 2 Puff(s) Inhalation every 6 hours  albuterol/ipratropium for Nebulization. 3 milliLiter(s) Nebulizer once  aMIOdarone    Tablet 200 milliGRAM(s) Oral daily  aspirin  chewable 81 milliGRAM(s) Oral daily  chlorhexidine 0.12% Liquid 15 milliLiter(s) Oral Mucosa every 12 hours  dexMEDEtomidine Infusion 0.2 MICROgram(s)/kG/Hr (4.08 mL/Hr) IV Continuous <Continuous>  dextrose 5%. 1000 milliLiter(s) (100 mL/Hr) IV Continuous <Continuous>  dextrose 50% Injectable 25 Gram(s) IV Push once  dextrose 50% Injectable 12.5 Gram(s) IV Push once  dextrose 50% Injectable 25 Gram(s) IV Push once  glucagon  Injectable 1 milliGRAM(s) IntraMuscular once  heparin   Injectable 5000 Unit(s) SubCutaneous every 12 hours  insulin lispro (ADMELOG) corrective regimen sliding scale   SubCutaneous every 6 hours  ipratropium 17 MICROgram(s) HFA Inhaler 1 Puff(s) Inhalation every 6 hours  lactulose Syrup 20 Gram(s) Oral two times a day  pantoprazole  Injectable 40 milliGRAM(s) IV Push daily  sodium chloride 0.9%. 1000 milliLiter(s) (75 mL/Hr) IV Continuous <Continuous>    MEDICATIONS  (PRN):  dextrose Oral Gel 15 Gram(s) Oral once PRN Blood Glucose LESS THAN 70 milliGRAM(s)/deciliter  midazolam Injectable 2 milliGRAM(s) IV Push every 2 hours PRN Agitation      Allergies  No Known Allergies    Intolerances  pork (Other)      REVIEW OF SYSTEMS:  unable to obtain    Vital Signs Last 24 Hrs  T(C): 36.6 (03 Jan 2023 08:44), Max: 36.9 (03 Jan 2023 03:57)  T(F): 97.9 (03 Jan 2023 08:44), Max: 98.4 (03 Jan 2023 03:57)  HR: 57 (03 Jan 2023 12:21) (51 - 101)  BP: 91/66 (03 Jan 2023 08:30) (85/65 - 135/103)  BP(mean): 75 (03 Jan 2023 08:30) (71 - 112)  RR: 16 (03 Jan 2023 08:30) (13 - 25)  SpO2: 100% (03 Jan 2023 12:21) (97% - 100%)    Parameters below as of 03 Jan 2023 08:30  Patient On (Oxygen Delivery Method): ventilator        PHYSICAL EXAM:  GENERAL: NAD, well-groomed, well-developed  HEAD:  Atraumatic, Normocephalic  EYES:  conjunctiva and sclera clear  ENMT: Moist mucous membranes  NECK: Supple, No JVD  NERVOUS SYSTEM:  awake, moving left side of his body  CHEST/LUNG: Clear to auscultation bilaterally;   HEART: Regular rate and rhythm; No murmurs, rubs, or gallops  ABDOMEN: Soft, Nontender, Nondistended; Bowel sounds present  EXTREMITIES:  2+ Peripheral Pulses, No clubbing, cyanosis, or edema  LYMPH: No lymphadenopathy noted  SKIN: No rashes or lesions    LABS:                        11.2   12.21 )-----------( 141      ( 03 Jan 2023 06:00 )             34.8     03 Jan 2023 06:00    132    |  96     |  126    ----------------------------<  129    4.0     |  20     |  5.75     Ca    8.1        03 Jan 2023 06:00    TPro  5.1    /  Alb  1.9    /  TBili  0.8    /  DBili  x      /  AST  44     /  ALT  130    /  AlkPhos  205    03 Jan 2023 06:00        CAPILLARY BLOOD GLUCOSE      POCT Blood Glucose.: 138 mg/dL (03 Jan 2023 11:34)  POCT Blood Glucose.: 127 mg/dL (03 Jan 2023 05:21)  POCT Blood Glucose.: 166 mg/dL (03 Jan 2023 00:00)  POCT Blood Glucose.: 122 mg/dL (02 Jan 2023 17:40)      RADIOLOGY & ADDITIONAL TESTS:    Imaging Personally Reviewed:  [ ] YES     Consultant(s) Notes Reviewed:      Care Discussed with Consultants/Other Providers:    Advanced Directives: [ ] DNR  [ ] No feeding tube  [ ] MOLST in chart  [ ] MOLST completed today  [ ] Unknown   Patient is a 62y old  Male who presents with a chief complaint of resp failure (03 Jan 2023 08:34)      INTERVAL HPI/OVERNIGHT EVENTS: overnight events noted.  pt now with second chest tube.     MEDICATIONS  (STANDING):  albuterol    90 MICROgram(s) HFA Inhaler 2 Puff(s) Inhalation every 6 hours  albuterol/ipratropium for Nebulization. 3 milliLiter(s) Nebulizer once  aMIOdarone    Tablet 200 milliGRAM(s) Oral daily  aspirin  chewable 81 milliGRAM(s) Oral daily  chlorhexidine 0.12% Liquid 15 milliLiter(s) Oral Mucosa every 12 hours  dexMEDEtomidine Infusion 0.2 MICROgram(s)/kG/Hr (4.08 mL/Hr) IV Continuous <Continuous>  dextrose 5%. 1000 milliLiter(s) (100 mL/Hr) IV Continuous <Continuous>  dextrose 50% Injectable 25 Gram(s) IV Push once  dextrose 50% Injectable 12.5 Gram(s) IV Push once  dextrose 50% Injectable 25 Gram(s) IV Push once  glucagon  Injectable 1 milliGRAM(s) IntraMuscular once  heparin   Injectable 5000 Unit(s) SubCutaneous every 12 hours  insulin lispro (ADMELOG) corrective regimen sliding scale   SubCutaneous every 6 hours  ipratropium 17 MICROgram(s) HFA Inhaler 1 Puff(s) Inhalation every 6 hours  lactulose Syrup 20 Gram(s) Oral two times a day  pantoprazole  Injectable 40 milliGRAM(s) IV Push daily  sodium chloride 0.9%. 1000 milliLiter(s) (75 mL/Hr) IV Continuous <Continuous>    MEDICATIONS  (PRN):  dextrose Oral Gel 15 Gram(s) Oral once PRN Blood Glucose LESS THAN 70 milliGRAM(s)/deciliter  midazolam Injectable 2 milliGRAM(s) IV Push every 2 hours PRN Agitation      Allergies  No Known Allergies    Intolerances  pork (Other)      REVIEW OF SYSTEMS:  unable to obtain    Vital Signs Last 24 Hrs  T(C): 36.6 (03 Jan 2023 08:44), Max: 36.9 (03 Jan 2023 03:57)  T(F): 97.9 (03 Jan 2023 08:44), Max: 98.4 (03 Jan 2023 03:57)  HR: 57 (03 Jan 2023 12:21) (51 - 101)  BP: 91/66 (03 Jan 2023 08:30) (85/65 - 135/103)  BP(mean): 75 (03 Jan 2023 08:30) (71 - 112)  RR: 16 (03 Jan 2023 08:30) (13 - 25)  SpO2: 100% (03 Jan 2023 12:21) (97% - 100%)    Parameters below as of 03 Jan 2023 08:30  Patient On (Oxygen Delivery Method): ventilator        PHYSICAL EXAM:  GENERAL: NAD, well-groomed, well-developed  HEAD:  Atraumatic, Normocephalic  EYES:  conjunctiva and sclera clear  ENMT: Moist mucous membranes  NECK: Supple, No JVD  NERVOUS SYSTEM:  awake, moving left side of his body  CHEST/LUNG: Clear to auscultation bilaterally;   HEART: Regular rate and rhythm;   ABDOMEN: Soft, Nontender, Nondistended; Bowel sounds present  EXTREMITIES:  2+ Peripheral Pulses, diffuse edema      LABS:                        11.2   12.21 )-----------( 141      ( 03 Jan 2023 06:00 )             34.8     03 Jan 2023 06:00    132    |  96     |  126    ----------------------------<  129    4.0     |  20     |  5.75     Ca    8.1        03 Jan 2023 06:00    TPro  5.1    /  Alb  1.9    /  TBili  0.8    /  DBili  x      /  AST  44     /  ALT  130    /  AlkPhos  205    03 Jan 2023 06:00        CAPILLARY BLOOD GLUCOSE  POCT Blood Glucose.: 138 mg/dL (03 Jan 2023 11:34)  POCT Blood Glucose.: 127 mg/dL (03 Jan 2023 05:21)  POCT Blood Glucose.: 166 mg/dL (03 Jan 2023 00:00)  POCT Blood Glucose.: 122 mg/dL (02 Jan 2023 17:40)      RADIOLOGY & ADDITIONAL TESTS:    Imaging Personally Reviewed:  [ ] YES     Consultant(s) Notes Reviewed:      Care Discussed with Consultants/Other Providers:    Advanced Directives: [ ] DNR  [ ] No feeding tube  [ ] MOLST in chart  [ ] MOLST completed today  [ ] Unknown

## 2023-01-04 NOTE — PROGRESS NOTE ADULT - SUBJECTIVE AND OBJECTIVE BOX
Patient is a 62y old  Male who presents with a chief complaint of resp failure (04 Jan 2023 13:26)      INTERVAL HPI/OVERNIGHT EVENTS: events noted,  no bleeding from ET Tube so far today.      MEDICATIONS  (STANDING):  albuterol    90 MICROgram(s) HFA Inhaler 2 Puff(s) Inhalation every 6 hours  albuterol/ipratropium for Nebulization. 3 milliLiter(s) Nebulizer once  aMIOdarone    Tablet 200 milliGRAM(s) Oral daily  aspirin  chewable 81 milliGRAM(s) Oral daily  chlorhexidine 0.12% Liquid 15 milliLiter(s) Oral Mucosa every 12 hours  dexMEDEtomidine Infusion 0.2 MICROgram(s)/kG/Hr (4.08 mL/Hr) IV Continuous <Continuous>  dextrose 5%. 1000 milliLiter(s) (100 mL/Hr) IV Continuous <Continuous>  dextrose 50% Injectable 12.5 Gram(s) IV Push once  dextrose 50% Injectable 25 Gram(s) IV Push once  dextrose 50% Injectable 25 Gram(s) IV Push once  DOBUTamine Infusion 5 MICROgram(s)/kG/Min (12.2 mL/Hr) IV Continuous <Continuous>  glucagon  Injectable 1 milliGRAM(s) IntraMuscular once  insulin lispro (ADMELOG) corrective regimen sliding scale   SubCutaneous every 6 hours  ipratropium 17 MICROgram(s) HFA Inhaler 1 Puff(s) Inhalation every 6 hours  lactulose Syrup 20 Gram(s) Oral two times a day  meropenem  IVPB 500 milliGRAM(s) IV Intermittent every 12 hours  pantoprazole  Injectable 40 milliGRAM(s) IV Push daily    MEDICATIONS  (PRN):  dextrose Oral Gel 15 Gram(s) Oral once PRN Blood Glucose LESS THAN 70 milliGRAM(s)/deciliter  fentaNYL    Injectable 25 MICROGram(s) IV Push every 2 hours PRN pain or vent synchrony  midazolam Injectable 2 milliGRAM(s) IV Push every 2 hours PRN Agitation      Allergies  No Known Allergies    Intolerances  pork (Other)      REVIEW OF SYSTEMS:  unable to obtain.     Vital Signs Last 24 Hrs  T(C): 35 (04 Jan 2023 08:12), Max: 36.9 (04 Jan 2023 04:00)  T(F): 95 (04 Jan 2023 08:12), Max: 98.4 (04 Jan 2023 04:00)  HR: 72 (04 Jan 2023 13:00) (61 - 96)  BP: 122/74 (04 Jan 2023 13:00) (99/67 - 157/90)  BP(mean): 89 (04 Jan 2023 13:00) (78 - 110)  RR: 17 (04 Jan 2023 13:00) (11 - 30)  SpO2: 100% (04 Jan 2023 13:00) (89% - 100%)    Parameters below as of 04 Jan 2023 12:00  Patient On (Oxygen Delivery Method): ventilator,CPAP mode      PHYSICAL EXAM:  GENERAL: intubated   HEAD:  Atraumatic, Normocephalic  EYES: conjunctiva and sclera clear  ENMT: intbated,   NECK: Supple,   NERVOUS SYSTEM:  awake at times, moves the left side of his body  CHEST/LUNG: Clear to auscultation bilaterally;   HEART: Regular rate and rhythm;   ABDOMEN: Soft, Nontender, Nondistended; Bowel sounds present  EXTREMITIES:  2+ Peripheral Pulses, right arm edema      LABS:                        11.0   14.94 )-----------( 151      ( 04 Jan 2023 06:43 )             33.4     04 Jan 2023 06:44    133    |  97     |  130    ----------------------------<  107    3.9     |  19     |  6.35     Ca    8.3        04 Jan 2023 06:44    TPro  5.6    /  Alb  2.1    /  TBili  0.8    /  DBili  x      /  AST  44     /  ALT  121    /  AlkPhos  197    04 Jan 2023 06:44    PT/INR - ( 04 Jan 2023 06:44 )   PT: 12.5 sec;   INR: 1.06 ratio    PTT - ( 03 Jan 2023 18:54 )  PTT:32.2 sec    CAPILLARY BLOOD GLUCOSE  POCT Blood Glucose.: 96 mg/dL (04 Jan 2023 11:19)  POCT Blood Glucose.: 109 mg/dL (04 Jan 2023 06:58)  POCT Blood Glucose.: 132 mg/dL (04 Jan 2023 00:24)  POCT Blood Glucose.: 100 mg/dL (03 Jan 2023 18:45)      RADIOLOGY & ADDITIONAL TESTS:    Imaging Personally Reviewed:  [ ] YES     Consultant(s) Notes Reviewed:      Care Discussed with Consultants/Other Providers:    Advanced Directives: [ ] DNR  [ ] No feeding tube  [ ] MOLST in chart  [ ] MOLST completed today  [ ] Unknown

## 2023-01-04 NOTE — PROGRESS NOTE ADULT - ASSESSMENT
62M CAD, Dilated cardiomyopathy, S/p AICD, Afib/flutter, h/o substance abuse,  presented with acute hypoxic and hypercarbic respiratory failure associated with pneumothorax.      ptx  resp failure  arnoldo hx  CM  AICD  AF    ethics consult noted - agree with conclusion -     no immediate family  friends only on listed number  no decision making capacity from friends  may need guardianship and decision from 2 MD's

## 2023-01-04 NOTE — PROGRESS NOTE ADULT - SUBJECTIVE AND OBJECTIVE BOX
Patient is a 61 y/o male who presents with a chief complaint of respiratory failure (04 Jan 2023 14:10)    BRIEF HOSPITAL COURSE: 61 y/o M with PMHx of HTN, dilated cardiomyopathy s/p AICD, CKD, opoid/substance abuse, and a fib/a flutter who presented to ER on 12/23 with shortness of breath and altered mental status from nursing home. Initially placed on NIPPV, but ultimately required intubation in the setting of worsening hypoxemia. Intubation complicated by left-sided PTX, and subsequent PEA cardiac arrest. Chest tube placed emergently by ER physician, and ROSC was achieved. Pt admitted to [facility] for continuation of care. Hospital course further complicated by aspiration PNA, shock, DERRICK, and recurrent left-sided PTX requiring emergent pigtail placement on 1/3.    Events last 24 hours: ***    PAST MEDICAL & SURGICAL HISTORY:  Heart failure, systolic  CAD (coronary artery disease)  Hypertension  Nonischemic cardiomyopathy  COPD, moderate  2019 novel coronavirus disease (COVID-19)  Substance abuse  HLD (hyperlipidemia)  Cardiac LV ejection fraction 10-20%  AICD (automatic cardioverter/defibrillator) present  Cardiac LV ejection fraction 10-20%    Review of Systems:  Unable to obtain. Intubated/sedated.    Medications:  meropenem  IVPB 500 milliGRAM(s) IV Intermittent every 12 hours  aMIOdarone    Tablet 200 milliGRAM(s) Oral daily  DOBUTamine Infusion 5 MICROgram(s)/kG/Min IV Continuous <Continuous>  albuterol    90 MICROgram(s) HFA Inhaler 2 Puff(s) Inhalation every 6 hours  albuterol/ipratropium for Nebulization. 3 milliLiter(s) Nebulizer once  ipratropium 17 MICROgram(s) HFA Inhaler 1 Puff(s) Inhalation every 6 hours  dexMEDEtomidine Infusion 0.2 MICROgram(s)/kG/Hr IV Continuous <Continuous>  fentaNYL    Injectable 25 MICROGram(s) IV Push every 2 hours PRN  midazolam Injectable 2 milliGRAM(s) IV Push every 2 hours PRN  aspirin  chewable 81 milliGRAM(s) Oral daily  lactulose Syrup 20 Gram(s) Oral two times a day  pantoprazole  Injectable 40 milliGRAM(s) IV Push daily  dextrose 50% Injectable 25 Gram(s) IV Push once  dextrose 50% Injectable 12.5 Gram(s) IV Push once  dextrose 50% Injectable 25 Gram(s) IV Push once  dextrose Oral Gel 15 Gram(s) Oral once PRN  glucagon  Injectable 1 milliGRAM(s) IntraMuscular once  insulin lispro (ADMELOG) corrective regimen sliding scale   SubCutaneous every 6 hours  dextrose 5%. 1000 milliLiter(s) IV Continuous <Continuous>  chlorhexidine 0.12% Liquid 15 milliLiter(s) Oral Mucosa every 12 hours    Mode: AC/ CMV (Assist Control/ Continuous Mandatory Ventilation)  RR (machine): 20  TV (machine): 400  PEEP: 5    ICU Vital Signs Last 24 Hrs  T(C): 36.4 (04 Jan 2023 15:41), Max: 36.9 (04 Jan 2023 04:00)  T(F): 97.5 (04 Jan 2023 15:41), Max: 98.4 (04 Jan 2023 04:00)  HR: 82 (04 Jan 2023 19:00) (67 - 93)  BP: 134/80 (04 Jan 2023 19:00) (122/74 - 146/124)  BP(mean): 97 (04 Jan 2023 19:00) (83 - 133)  ABP: --  ABP(mean): --  RR: 20 (04 Jan 2023 19:00) (11 - 30)  SpO2: 100% (04 Jan 2023 19:00) (97% - 100%)    O2 Parameters below as of 04 Jan 2023 19:00  Patient On (Oxygen Delivery Method): ventilator    O2 Concentration (%): 40    ABG - ( 04 Jan 2023 06:33 )  pH, Arterial: 7.33  pH, Blood: x     /  pCO2: 32    /  pO2: 132   / HCO3: 17    / Base Excess: -9.0  /  SaO2: 99.8      I&O's Detail    03 Jan 2023 07:01  -  04 Jan 2023 07:00  --------------------------------------------------------  IN:    Dexmedetomidine: 140.4 mL    DOBUTamine: 195.2 mL    IV PiggyBack: 150 mL    sodium chloride 0.9%: 150 mL  Total IN: 635.6 mL    OUT:    Chest Tube (mL): 5 mL    Chest Tube (mL): 25 mL    Indwelling Catheter - Urethral (mL): 250 mL    Nepro with Carb Steady: 0 mL  Total OUT: 280 mL    Total NET: 355.6 mL    04 Jan 2023 07:01  -  04 Jan 2023 20:01  --------------------------------------------------------  IN:    Dexmedetomidine: 98.4 mL    DOBUTamine: 146.4 mL    Enteral Tube Flush: 250 mL    IV PiggyBack: 50 mL    Nepro with Carb Steady: 230 mL  Total IN: 774.8 mL    OUT:    Chest Tube (mL): 15 mL    Indwelling Catheter - Urethral (mL): 400 mL  Total OUT: 415 mL    Total NET: 359.8 mL    LABS:                        11.0   14.94 )-----------( 151      ( 04 Jan 2023 06:43 )             33.4     01-04    133<L>  |  97  |  130<H>  ----------------------------<  107<H>  3.9   |  19<L>  |  6.35<H>    Ca    8.3<L>      04 Jan 2023 06:44    TPro  5.6<L>  /  Alb  2.1<L>  /  TBili  0.8  /  DBili  x   /  AST  44<H>  /  ALT  121<H>  /  AlkPhos  197<H>  01-04    CAPILLARY BLOOD GLUCOSE    POCT Blood Glucose.: 102 mg/dL (04 Jan 2023 18:00)    PT/INR - ( 04 Jan 2023 06:44 )   PT: 12.5 sec;   INR: 1.06 ratio    PTT - ( 03 Jan 2023 18:54 )  PTT:32.2 sec    CULTURES:    Physical Examination:    General: Well appearing, lying in bed in NAD.      HEENT: Pupils equal, reactive to light. Symmetric. No scleral icterus or injection.    PULM: Clear to auscultation B/L. No wheezes, rales, or rhonchi apprecaited. No significant sputum production or increased respiratory effort.    NECK: Supple, no lymphadenopathy, trachea midline.    CVS: Regular rate and rhythm, no murmurs appreciated, +s1/s2.    ABD: Soft, nondistended, nontender, normoactive bowel sounds.    EXT: No edema, nontender.    SKIN: Warm and well perfused, no rashes noted.    NEURO: Alert, oriented, interactive, nonfocal.    RADIOLOGY:  < from: Xray Chest 1 View- PORTABLE-Routine (Xray Chest 1 View- PORTABLE-Routine in AM.) (01.04.23 @ 06:31) >  ACC: [id number] EXAM:  XR CHEST PORTABLE URGENT 1V                        ACC: [id number] EXAM:  XR CHEST PORTABLE ROUTINE 1V                        ACC: [id number] EXAM:  XR CHEST PORTABLE ROUTINE 1V                          PROCEDURE DATE:  01/03/2023    INTERPRETATION:  HISTORY: Admitting Dxs: J96.01 RESP DISTRESS; ; NGT   Placement;  TECHNIQUE: Serial portable chest x-rays, 3 studies.  COMPARISON: 1:35 AM.  FINDINGS/  IMPRESSION:    First study is from 1/20/2023 9:34 AM and shows an enlarged heart. The   endotracheal tube is above the jennie. A cardiac device overlies and   obscures the left hemithorax with lead in place.. The nasogastric tube   courses below the left hemidiaphragm, tip off edge of film. There is a   right basilar loculated effusion. And basilar infiltrate. A left-sided   chest tube is seen. A pigtail catheter is seen at the left apex obscured   by the cardiac device. There is no pneumothorax. Subcutaneous emphysema   seen over the left chest.    The follow-up is from 1/3/2023 at 1:35 AM (2 films) and shows no   significant change..    The follow-up is from 1/4/2023  and shows no significant change.    --- End of Report ---    EDGARD LOWERY MD; Attending Interventional Radiologist  This document has been electronically signed. Jan 4 2023  7:38PM    < end of copied text >    CRITICAL CARE TIME SPENT: 36 minutes Patient is a 63 y/o male who presents with a chief complaint of respiratory failure (04 Jan 2023 14:10)    BRIEF HOSPITAL COURSE: 63 y/o M with PMHx of HTN, dilated cardiomyopathy s/p AICD, CKD, opoid/substance abuse, and a fib/a flutter who presented to ER on 12/23 with shortness of breath and altered mental status from nursing home. Initially placed on NIPPV, but ultimately required intubation in the setting of worsening hypoxemia. Intubation complicated by left-sided PTX, and subsequent PEA cardiac arrest. Chest tube placed emergently by ER physician, and ROSC was achieved. Pt admitted to [facility] for continuation of care. Hospital course further complicated by aspiration PNA, shock, DERRICK, and recurrent left-sided PTX requiring emergent pigtail placement on 1/3.    Events last 24 hours: On full ventilatory support. Still suctioning some blood from ET tube. Intermittently follows simple commands.    PAST MEDICAL & SURGICAL HISTORY:  Heart failure, systolic  CAD (coronary artery disease)  Hypertension  Nonischemic cardiomyopathy  COPD, moderate  2019 novel coronavirus disease (COVID-19)  Substance abuse  HLD (hyperlipidemia)  Cardiac LV ejection fraction 10-20%  AICD (automatic cardioverter/defibrillator) present  Cardiac LV ejection fraction 10-20%    Review of Systems:  Unable to obtain. Intubated/sedated.    Medications:  meropenem  IVPB 500 milliGRAM(s) IV Intermittent every 12 hours  aMIOdarone    Tablet 200 milliGRAM(s) Oral daily  DOBUTamine Infusion 5 MICROgram(s)/kG/Min IV Continuous <Continuous>  albuterol    90 MICROgram(s) HFA Inhaler 2 Puff(s) Inhalation every 6 hours  albuterol/ipratropium for Nebulization. 3 milliLiter(s) Nebulizer once  ipratropium 17 MICROgram(s) HFA Inhaler 1 Puff(s) Inhalation every 6 hours  dexMEDEtomidine Infusion 0.2 MICROgram(s)/kG/Hr IV Continuous <Continuous>  fentaNYL    Injectable 25 MICROGram(s) IV Push every 2 hours PRN  midazolam Injectable 2 milliGRAM(s) IV Push every 2 hours PRN  aspirin  chewable 81 milliGRAM(s) Oral daily  lactulose Syrup 20 Gram(s) Oral two times a day  pantoprazole  Injectable 40 milliGRAM(s) IV Push daily  dextrose 50% Injectable 25 Gram(s) IV Push once  dextrose 50% Injectable 12.5 Gram(s) IV Push once  dextrose 50% Injectable 25 Gram(s) IV Push once  dextrose Oral Gel 15 Gram(s) Oral once PRN  glucagon  Injectable 1 milliGRAM(s) IntraMuscular once  insulin lispro (ADMELOG) corrective regimen sliding scale   SubCutaneous every 6 hours  dextrose 5%. 1000 milliLiter(s) IV Continuous <Continuous>  chlorhexidine 0.12% Liquid 15 milliLiter(s) Oral Mucosa every 12 hours    Mode: AC/ CMV (Assist Control/ Continuous Mandatory Ventilation)  RR (machine): 20  TV (machine): 400  PEEP: 5    ICU Vital Signs Last 24 Hrs  T(C): 36.4 (04 Jan 2023 15:41), Max: 36.9 (04 Jan 2023 04:00)  T(F): 97.5 (04 Jan 2023 15:41), Max: 98.4 (04 Jan 2023 04:00)  HR: 82 (04 Jan 2023 19:00) (67 - 93)  BP: 134/80 (04 Jan 2023 19:00) (122/74 - 146/124)  BP(mean): 97 (04 Jan 2023 19:00) (83 - 133)  ABP: --  ABP(mean): --  RR: 20 (04 Jan 2023 19:00) (11 - 30)  SpO2: 100% (04 Jan 2023 19:00) (97% - 100%)    O2 Parameters below as of 04 Jan 2023 19:00  Patient On (Oxygen Delivery Method): ventilator    O2 Concentration (%): 40    ABG - ( 04 Jan 2023 06:33 )  pH, Arterial: 7.33  pH, Blood: x     /  pCO2: 32    /  pO2: 132   / HCO3: 17    / Base Excess: -9.0  /  SaO2: 99.8      I&O's Detail    03 Jan 2023 07:01  -  04 Jan 2023 07:00  --------------------------------------------------------  IN:    Dexmedetomidine: 140.4 mL    DOBUTamine: 195.2 mL    IV PiggyBack: 150 mL    sodium chloride 0.9%: 150 mL  Total IN: 635.6 mL    OUT:    Chest Tube (mL): 5 mL    Chest Tube (mL): 25 mL    Indwelling Catheter - Urethral (mL): 250 mL    Nepro with Carb Steady: 0 mL  Total OUT: 280 mL    Total NET: 355.6 mL    04 Jan 2023 07:01  -  04 Jan 2023 20:01  --------------------------------------------------------  IN:    Dexmedetomidine: 98.4 mL    DOBUTamine: 146.4 mL    Enteral Tube Flush: 250 mL    IV PiggyBack: 50 mL    Nepro with Carb Steady: 230 mL  Total IN: 774.8 mL    OUT:    Chest Tube (mL): 15 mL    Indwelling Catheter - Urethral (mL): 400 mL  Total OUT: 415 mL    Total NET: 359.8 mL    LABS:                        11.0   14.94 )-----------( 151      ( 04 Jan 2023 06:43 )             33.4     01-04    133<L>  |  97  |  130<H>  ----------------------------<  107<H>  3.9   |  19<L>  |  6.35<H>    Ca    8.3<L>      04 Jan 2023 06:44    TPro  5.6<L>  /  Alb  2.1<L>  /  TBili  0.8  /  DBili  x   /  AST  44<H>  /  ALT  121<H>  /  AlkPhos  197<H>  01-04    CAPILLARY BLOOD GLUCOSE    POCT Blood Glucose.: 102 mg/dL (04 Jan 2023 18:00)    PT/INR - ( 04 Jan 2023 06:44 )   PT: 12.5 sec;   INR: 1.06 ratio    PTT - ( 03 Jan 2023 18:54 )  PTT:32.2 sec    CULTURES:    Physical Examination:    General: Well appearing, lying in bed in NAD.      HEENT: Pupils equal, reactive to light. Symmetric. No scleral icterus or injection.    PULM: Diminished breath sounds b/l.    NECK: Supple, no lymphadenopathy, trachea midline.    CVS: Regular rate and rhythm, no murmurs appreciated, +s1/s2.    ABD: Soft, nondistended, nontender, normoactive bowel sounds.    EXT: No edema, nontender.    SKIN: Warm and well perfused, no rashes noted.    NEURO: Sedated.    RADIOLOGY:  < from: Xray Chest 1 View- PORTABLE-Routine (Xray Chest 1 View- PORTABLE-Routine in AM.) (01.04.23 @ 06:31) >  ACC: [id number] EXAM:  XR CHEST PORTABLE URGENT 1V                        ACC: [id number] EXAM:  XR CHEST PORTABLE ROUTINE 1V                        ACC: [id number] EXAM:  XR CHEST PORTABLE ROUTINE 1V                          PROCEDURE DATE:  01/03/2023    INTERPRETATION:  HISTORY: Admitting Dxs: J96.01 RESP DISTRESS; ; NGT   Placement;  TECHNIQUE: Serial portable chest x-rays, 3 studies.  COMPARISON: 1:35 AM.  FINDINGS/  IMPRESSION:    First study is from 1/20/2023 9:34 AM and shows an enlarged heart. The   endotracheal tube is above the jennie. A cardiac device overlies and   obscures the left hemithorax with lead in place.. The nasogastric tube   courses below the left hemidiaphragm, tip off edge of film. There is a   right basilar loculated effusion. And basilar infiltrate. A left-sided   chest tube is seen. A pigtail catheter is seen at the left apex obscured   by the cardiac device. There is no pneumothorax. Subcutaneous emphysema   seen over the left chest.    The follow-up is from 1/3/2023 at 1:35 AM (2 films) and shows no   significant change..    The follow-up is from 1/4/2023  and shows no significant change.    --- End of Report ---    EDGARD LOWERY MD; Attending Interventional Radiologist  This document has been electronically signed. Jan 4 2023  7:38PM    < end of copied text >    CRITICAL CARE TIME SPENT: 36 minutes

## 2023-01-04 NOTE — PROGRESS NOTE ADULT - ASSESSMENT
61 y/o M with PMHx of HTN, dilated cardiomyopathy s/p AICD, CKD, opoid/substance abuse, and a fib/a flutter admitted with:    Cardiac arrest, acute hypoxic respiratory failure, aspiration PNA, left-sided PTX, DERRICK, metabolic encephalopathy, transaminitis    PLAN:  - Sedated with precedex for ventilator synchrony and patient comfort.  - Fentanyl and versed PRN for agitation.  - Dobutamine initiated by cardiology in an attempt to improved urine output. EF 10%/severely reduced systolic function in 2/2022.  - Hemodynamically stable.  - Actively titrating ventilator settings to maintain SpO2 > 92% and adequate minute ventilation.  - Chest tubes remain on suction.  - GI prophylaxis with protonix.  - Tube feeds as tolerated.  - Meropenem re-started on 1/3 for pneumonia.  - Hold chemical DVT prophylaxis in the setting of bloody secretions being suctioned from ET tube.  - S/p DDAVP. 63 y/o M with PMHx of HTN, dilated cardiomyopathy s/p AICD, CKD, opoid/substance abuse, and a fib/a flutter admitted with:    Cardiac arrest, acute hypoxic respiratory failure, aspiration PNA, left-sided PTX, DERRICK, metabolic encephalopathy, transaminitis    PLAN:  - Sedated with precedex for ventilator synchrony and patient comfort.  - Fentanyl and versed PRN for agitation.  - Dobutamine initiated by cardiology in an attempt to improved urine output. EF 10%/severely reduced systolic function in 2/2022.  - Hemodynamically stable.  - Actively titrating ventilator settings to maintain SpO2 > 92% and adequate minute ventilation.  - Chest tubes remain on suction.  - GI prophylaxis with protonix.  - Tube feeds as tolerated.  - Renal function continues to worsen, although, urine output has improved. Will require HD regardless.  - Meropenem re-started on 1/3 for pneumonia.  - Hold chemical DVT prophylaxis in the setting of bloody secretions being suctioned from ET tube.  - S/p DDAVP.  - Ethics and palliative care following.

## 2023-01-04 NOTE — PROGRESS NOTE ADULT - SUBJECTIVE AND OBJECTIVE BOX
DOROTHY VOSS is a 62yMale , patient examined and chart reviewed.     INTERVAL HPI/ OVERNIGHT EVENTS:   Afebrile. Remains Vented sedated.  No events.      PAST MEDICAL & SURGICAL HISTORY:  Heart failure, systolic  CAD (coronary artery disease)  Hypertension  Nonischemic cardiomyopathy  COPD, moderate  2019 novel coronavirus disease (COVID-19)  Substance abuse  HLD (hyperlipidemia)  Cardiac LV ejection fraction 10-20%  AICD (automatic cardioverter/defibrillator) present  Cardiac LV ejection fraction 10-20%        For details regarding the patient's social history, family history, and other miscellaneous elements, please refer the initial infectious diseases consultation and/or the admitting history and physical examination for this admission.    ROS:  Unable to obtain due to : pt's condition    ALLERGIES  pork (Other)      Current inpatient medications :    ANTIBIOTICS/RELEVANT:  meropenem  IVPB 500 milliGRAM(s) IV Intermittent every 12 hours    MEDICATIONS  (STANDING):  albuterol    90 MICROgram(s) HFA Inhaler 2 Puff(s) Inhalation every 6 hours  albuterol/ipratropium for Nebulization. 3 milliLiter(s) Nebulizer once  aMIOdarone    Tablet 200 milliGRAM(s) Oral daily  aspirin  chewable 81 milliGRAM(s) Oral daily  chlorhexidine 0.12% Liquid 15 milliLiter(s) Oral Mucosa every 12 hours  dexMEDEtomidine Infusion 0.2 MICROgram(s)/kG/Hr (4.08 mL/Hr) IV Continuous <Continuous>  dextrose 5%. 1000 milliLiter(s) (100 mL/Hr) IV Continuous <Continuous>  dextrose 50% Injectable 12.5 Gram(s) IV Push once  dextrose 50% Injectable 25 Gram(s) IV Push once  dextrose 50% Injectable 25 Gram(s) IV Push once  DOBUTamine Infusion 5 MICROgram(s)/kG/Min (12.2 mL/Hr) IV Continuous <Continuous>  glucagon  Injectable 1 milliGRAM(s) IntraMuscular once  insulin lispro (ADMELOG) corrective regimen sliding scale   SubCutaneous every 6 hours  ipratropium 17 MICROgram(s) HFA Inhaler 1 Puff(s) Inhalation every 6 hours  lactulose Syrup 20 Gram(s) Oral two times a day  pantoprazole  Injectable 40 milliGRAM(s) IV Push daily    MEDICATIONS  (PRN):  dextrose Oral Gel 15 Gram(s) Oral once PRN Blood Glucose LESS THAN 70 milliGRAM(s)/deciliter  fentaNYL    Injectable 25 MICROGram(s) IV Push every 2 hours PRN pain or vent synchrony  midazolam Injectable 2 milliGRAM(s) IV Push every 2 hours PRN Agitation      Objective:  ICU Vital Signs Last 24 Hrs  T(C): 35 (04 Jan 2023 08:12), Max: 36.9 (04 Jan 2023 04:00)  T(F): 95 (04 Jan 2023 08:12), Max: 98.4 (04 Jan 2023 04:00)  HR: 72 (04 Jan 2023 13:00) (61 - 96)  BP: 122/74 (04 Jan 2023 13:00) (99/67 - 157/90)  BP(mean): 89 (04 Jan 2023 13:00) (78 - 110)  RR: 17 (04 Jan 2023 13:00) (11 - 30)  SpO2: 100% (04 Jan 2023 13:00) (89% - 100%)    O2 Parameters below as of 04 Jan 2023 12:00  Patient On (Oxygen Delivery Method): ventilator,CPAP mode    Mode: AC/ CMV (Assist Control/ Continuous Mandatory Ventilation), RR (machine): 20, TV (machine): 400, FiO2: 40, PEEP: 5, ITime: 1, MAP: 10, PIP: 15    Physical Exam:  General: vented sedated  Neck: supple, trachea midline  Lungs: decreased no wheeze/rhonchi Left chest tubes x 2  Cardiovascular: regular rate and rhythm, S1 S2  Abdomen: soft, nontender,  bowel sounds normal  Neurological: sedated  Skin: no rash  Extremities: +edema        LABS:                          11.0   14.94 )-----------( 151      ( 04 Jan 2023 06:43 )             33.4   01-04    133<L>  |  97  |  130<H>  ----------------------------<  107<H>  3.9   |  19<L>  |  6.35<H>    Ca    8.3<L>      04 Jan 2023 06:44    TPro  5.6<L>  /  Alb  2.1<L>  /  TBili  0.8  /  DBili  x   /  AST  44<H>  /  ALT  121<H>  /  AlkPhos  197<H>  01-04    ABG - ( 03 Jan 2023 07:30 )  pH, Arterial: 7.34  pH, Blood: x     /  pCO2: 30    /  pO2: 80    / HCO3: 16    / Base Excess: -9.6  /  SaO2: 97.3        MICROBIOLOGY:  Culture - Sputum . (12.24.22 @ 17:43)    Gram Stain:   Numerous polymorphonuclear leukocytes per low power field  No Squamous epithelial cells per low power field  Rare Gram Positive Rods seen per oil power field    -  Amikacin: S <=16    -  Aztreonam: S 8    -  Cefepime: S <=2    -  Ceftazidime: S 4    -  Ciprofloxacin: S <=0.25    -  Gentamicin: S 4    -  Imipenem: S <=1    -  Levofloxacin: S <=0.5    -  Meropenem: S <=1    -  Piperacillin/Tazobactam: S <=8    -  Tobramycin: S <=2    Specimen Source: ET Tube ET Tube    Culture Results:   Rare Pseudomonas aeruginosa  Normal Respiratory Cathy absent    Organism Identification: Pseudomonas aeruginosa    Organism: Pseudomonas aeruginosa    Method Type: BAY    Culture - Urine (12.23.22 @ 11:16)    -  Tobramycin: S <=2    -  Amoxicillin/Clavulanic Acid: S <=8/4    -  Ampicillin: R 16 These ampicillin results predict results for amoxicillin    -  Ampicillin/Sulbactam: S <=4/2 Enterobacter, Klebsiella aerogenes, Citrobacter, and Serratia may develop resistance during prolonged therapy (3-4 days)    -  Amikacin: S <=16    -  Cefazolin: S <=2    -  Cefoxitin: S <=8    -  Aztreonam: S <=4    -  Cefepime: S <=2    -  Imipenem: S <=1    -  Levofloxacin: S <=0.5    -  Ceftriaxone: S <=1 Enterobacter, Klebsiella aerogenes, Citrobacter, and Serratia may develop resistance during prolonged therapy    -  Ciprofloxacin: S <=0.25    -  Trimethoprim/Sulfamethoxazole: S <=0.5/9.5    -  Meropenem: S <=1    -  Nitrofurantoin: S <=32 Should not be used to treat pyelonephritis    -  Ertapenem: S <=0.5    -  Gentamicin: S <=2    -  Piperacillin/Tazobactam: S <=8    Specimen Source: Clean Catch Clean Catch (Midstream)    Culture Results:   >100,000 CFU/ml Klebsiella oxytoca/Raoultella ornithinolytica    Organism Identification: Klebsiella oxytoca /Raoutella ornithinolytica    Organism: Klebsiella oxytoca /Raoutella ornithinolytica    Method Type: [facility]      Culture - Blood (12.23.22 @ 14:06)    Specimen Source: .Blood Blood-Peripheral    Culture Results:   No Growth Final      RADIOLOGY & ADDITIONAL STUDIES:    ACC: [id number] EXAM:  CT CHEST                          PROCEDURE DATE:  12/29/2022          INTERPRETATION:  HISTORY: Admitting Dxs: J96.01 RESP DISTRESS    EXAMINATION: CT CHEST was performed without IV contrast.    COMPARISON: 12/3/2022.    FINDINGS:    AIRWAYS, LUNGS, PLEURA: Satisfactory positioning of endotracheal tube.   Mild central airways secretions. Unchanged small loculated left   pneumothorax. Small chronic loculated right pleural effusion unchanged.   Right basilar and right middlelobe atelectasis. Emphysema.    Left chest tube in place with distal tip along the medial and upper   pleural space.    MEDIASTINUM: Cardiomegaly. No pericardial effusion. Thoracic aorta normal   caliber.  No large mediastinal lymph nodes. ICD lead terminates in the   right ventricle.    IMAGED ABDOMEN: Enteric catheter terminates in the stomach.   Cholelithiasis. Decreased abdominal extraluminal gas.    SOFT TISSUES: Decreased subcutaneous soft tissue emphysema.    BONES: Unremarkable.      IMPRESSION:.    Unchanged small loculated left pneumothorax.    Unchanged small loculated and chronic right pleural effusion.    Decreased subcutaneous soft tissue emphysema and extraluminal abdominal   gas.      ACC: [id number] EXAM:  XR CHEST PORTABLE IMMED 1V                        ACC: [id number] EXAM:  XR CHEST PORTABLE URGENT 1V                        ACC: [id number] EXAM:  XR CHEST PORTABLE URGENT 1V                        ACC: [id number] EXAM:  XR CHEST PORTABLE IMMED 1V                          PROCEDURE DATE:  01/03/2023          INTERPRETATION:  TIME OF EXAM: January 2, 2023 at 11:46 PM and 11:47 PM.    CLINICAL INFORMATION: Status post cardiac arrest. Respiratory distress   and abnormal chest sounds.    COMPARISON:  CT scan of the chest from December 29, 2022.    TECHNIQUE:   AP Portable chest x-ray.    INTERPRETATION:    The cardiac silhouette is enlarged. There is a left chest wall ICD with   intact lead with tip in expected region of the right ventricle. The   generator obscures part of the left lung.  The mediastinum is not accurately evaluated on these images.  ET tube tip is approximately 7.4 cm above the jennie.  Enteric tube tip is near the GE junction with side port in the distal   esophagus.  Left chest tube not significantly changed in position.  No significant change in small loculated right pleural effusion and right   mid and lower lung opacities. Question small pneumothorax associated with   the pleural effusion versus interposed aerated lung. Question tiny right   apical pneumothorax versus apical bulla.  Large left pneumothorax, increased in size. Concern for tension as there   is inversion of the left hemidiaphragm and widening of the rib   interspaces on the left compared to the right. Atelectasis of underlying   lung.  No acute bony abnormality.    AP portable chest x-ray from January 3, 2023 at 12:05 AM and 12:06 AM:    CLINICAL INFORMATION: Pneumothorax.    INTERPRETATION:    ET tube tip is 5.7 cm above the jennie. Enteric tube and left chest tube   unchanged in position.  Large left pneumothorax with concerns for tension, not significantly   changed.  Right-sided findings not significantly changed.    AP portable chest x-ray from January 3, 2023 at 12:53 AM and 12:54 AM:    CLINICAL INFORMATION: Replaced left chest tube.    INTERPRETATION:    ET tube tip is 6.2 cm above the jennie.  Enteric tube tip near GE junction with side port in the distal esophagus.  Left chest tube with tip projecting over the aortic knob.  Large left pneumothorax with concerns for tension, not significantly   changed. Continued atelectasis of underlying lung.  New left subcutaneous emphysema.  Small loculated right pleural effusion with likely associated passive   atelectasis is not significantly changed. Once again a small pneumothorax   component is not excluded. In addition, continued question of minimal   right apical pneumothorax versus apical bulla.    AP portable chest x-ray from January 3, 2023 at 1:35 AM:    CLINICAL INFORMATION: Chest tube.    INTERPRETATION:    ET tube tip approximately 4 cm above the jennie.  Enteric tube tip is likely in the stomach with the side port near the GE   junction.  Left chest wall ICD again seen.  Small loculated right pleural effusion with right mid and lower lung   opacities, not significantly changed. Previous concern for small   associated pneumothorax not seen. Question tiny right apical pneumothorax   versus apical bulla, unchanged.  Left chest tube unchanged in position. Left pneumothorax has resolved.   Left subcutaneous emphysema is slightly decreased. No left pleural   effusion.        IMPRESSION:  Left chest tube unchanged in position on the most recent   image with resolution of left pneumothorax. Slight decrease in left   subcutaneous emphysema.    Enteric tube tip likely in the stomach with side port near the GE   junction. Consider advancing the enteric tube. Discussed with Dr. Mu   with read back at 9:03 AM on January 3, 2023 by Dr. Sarmiento.    Small loculated right pleural effusion with right mid and lower lung   opacities, possibly atelectasis, although infection not excluded, not   significantly changed.    Continued question tiny right apical pneumothorax versus apical bulla.      Assessment :  Pt is a 62M CAD, Dilated cardiomyopathy, S/p AICD, Afib/flutter, h/o substance abuse, CKD baseline creat approx 1.4 last admitted to [facility] with MSSA bacteremia, and CHF.    Sent from [facility] with acute hypoxic respiratory failure. Was initially on BiPAP then became hypoxic.  Anesthesia intubated patient.  On post intubation Xray pneumothorax evident.  Patient with PEA arrest.  Chest tube placed by ED physician.  S/p PEA arrest  AHRF requiring intubation  Aspiration PNA   Loculated pleural effusions  - imaging reviewed  - BCx negative   - Sputum Cx -- Pseudomonas  - UCx -- Klebsiella  - WBC stable, afebrile  - s/p zosyn x10d course completed 1/1/2023  - pneumothorax  - WBC trending and hypothermic- restarted on Antibiotic- Meropenam as there was a concern for recurrent pneumonia    Plan:  - cont Meropenam x 5 days  - trend temps/WBC--stable  - maintain aspiration precautions  - chest tube per pulm critical care  - vent management per ICU care  - GOC per primary team      Continue with present regiment.  Appropriate use of antibiotics and adverse effects reviewed.      > 35 minutes were spent in direct patient care reviewing notes, medications ,labs data/ imaging , discussion with multidisciplinary team.    Thank you for allowing me to participate in care of your patient .    Kelley Phan MD  Infectious Disease  [phone]

## 2023-01-04 NOTE — PROGRESS NOTE ADULT - ASSESSMENT
62M CAD, Dilated cardiomyopathy, S/p AICD, Afib/flutter, h/o substance abuse, CKD baseline creat approx 1.4 last admitted to [facility] with MSSA bacteremia, and CHF.  Sent from [facility] with acute hypoxic respiratory failure/PEA arrest.  Initial CT Head - No acute pathology.  On C pap  Improvement is seen. Pt seem to make eye contact and is able to visually track at times.   Repeat CT head 12/29 - : No acute intracranial hemorrhage, mass effect, or shift of the midline structures. Similar-appearing mild chronic white matter microvascular type changes.  Able to move left hand and foot some what. No movements seen on right hand or foot. Right hand is swollen. CT Headx2 - Are neg for any acute pathology -  If weakness persists, would get MRI B rain when feasible.  Renal failure  Elevated LFTs.  Severe left ventricular systolic dysfunction. EF 10 %  Pt with multi organ failure. Poor overall prognosis.  Would follow.

## 2023-01-04 NOTE — PROGRESS NOTE ADULT - SUBJECTIVE AND OBJECTIVE BOX
Subjective: intubated, FiO2 40%. Eyes opened. Following commands.   On Dobutamine.   UO not recorded. Currently with 300cc of urine in the bag      MEDICATIONS  (STANDING):  albuterol    90 MICROgram(s) HFA Inhaler 2 Puff(s) Inhalation every 6 hours  albuterol/ipratropium for Nebulization. 3 milliLiter(s) Nebulizer once  aMIOdarone    Tablet 200 milliGRAM(s) Oral daily  aspirin  chewable 81 milliGRAM(s) Oral daily  chlorhexidine 0.12% Liquid 15 milliLiter(s) Oral Mucosa every 12 hours  dexMEDEtomidine Infusion 0.2 MICROgram(s)/kG/Hr (4.08 mL/Hr) IV Continuous <Continuous>  dextrose 5%. 1000 milliLiter(s) (100 mL/Hr) IV Continuous <Continuous>  dextrose 50% Injectable 12.5 Gram(s) IV Push once  dextrose 50% Injectable 25 Gram(s) IV Push once  dextrose 50% Injectable 25 Gram(s) IV Push once  DOBUTamine Infusion 5 MICROgram(s)/kG/Min (12.2 mL/Hr) IV Continuous <Continuous>  glucagon  Injectable 1 milliGRAM(s) IntraMuscular once  insulin lispro (ADMELOG) corrective regimen sliding scale   SubCutaneous every 6 hours  ipratropium 17 MICROgram(s) HFA Inhaler 1 Puff(s) Inhalation every 6 hours  lactulose Syrup 20 Gram(s) Oral two times a day  meropenem  IVPB 500 milliGRAM(s) IV Intermittent every 12 hours  pantoprazole  Injectable 40 milliGRAM(s) IV Push daily    MEDICATIONS  (PRN):  dextrose Oral Gel 15 Gram(s) Oral once PRN Blood Glucose LESS THAN 70 milliGRAM(s)/deciliter  fentaNYL    Injectable 25 MICROGram(s) IV Push every 2 hours PRN pain or vent synchrony  midazolam Injectable 2 milliGRAM(s) IV Push every 2 hours PRN Agitation          T(C): 35 (01-04-23 @ 08:12), Max: 36.9 (01-04-23 @ 04:00)  HR: 72 (01-04-23 @ 13:00) (61 - 96)  BP: 122/74 (01-04-23 @ 13:00) (99/67 - 157/90)  RR: 17 (01-04-23 @ 13:00) (11 - 30)  SpO2: 100% (01-04-23 @ 13:00) (89% - 100%)  Wt(kg): --    ABG - ( 04 Jan 2023 06:33 )  pH, Arterial: 7.33  pH, Blood: x     /  pCO2: 32    /  pO2: 132   / HCO3: 17    / Base Excess: -9.0  /  SaO2: 99.8                I&O's Detail    03 Jan 2023 07:01  -  04 Jan 2023 07:00  --------------------------------------------------------  IN:    Dexmedetomidine: 140.4 mL    DOBUTamine: 195.2 mL    IV PiggyBack: 150 mL    sodium chloride 0.9%: 150 mL  Total IN: 635.6 mL    OUT:    Chest Tube (mL): 5 mL    Chest Tube (mL): 25 mL    Indwelling Catheter - Urethral (mL): 250 mL    Nepro with Carb Steady: 0 mL  Total OUT: 280 mL    Total NET: 355.6 mL      04 Jan 2023 07:01  -  04 Jan 2023 14:09  --------------------------------------------------------  IN:    Dexmedetomidine: 49.2 mL    DOBUTamine: 73.2 mL    Nepro with Carb Steady: 60 mL  Total IN: 182.4 mL    OUT:  Total OUT: 0 mL    Total NET: 182.4 mL          Mode: CPAP with PS  FiO2: 40  PEEP: 5  PS: 10  ITime: 1  MAP: 10  PIP: 15       PHYSICAL EXAM:    GENERAL: intubated.   CHEST/LUNG: coarse BS  HEART: S1S2  ABDOMEN: Soft, distended,   EXTREMITIES:  trace edema      LABS:  CBC Full  -  ( 04 Jan 2023 06:43 )  WBC Count : 14.94 K/uL  RBC Count : 3.65 M/uL  Hemoglobin : 11.0 g/dL  Hematocrit : 33.4 %  Platelet Count - Automated : 151 K/uL  Mean Cell Volume : 91.5 fl  Mean Cell Hemoglobin : 30.1 pg  Mean Cell Hemoglobin Concentration : 32.9 gm/dL  Auto Neutrophil # : x  Auto Lymphocyte # : x  Auto Monocyte # : x  Auto Eosinophil # : x  Auto Basophil # : x  Auto Neutrophil % : x  Auto Lymphocyte % : x  Auto Monocyte % : x  Auto Eosinophil % : x  Auto Basophil % : x    01-04    133<L>  |  97  |  130<H>  ----------------------------<  107<H>  3.9   |  19<L>  |  6.35<H>    Ca    8.3<L>      04 Jan 2023 06:44    TPro  5.6<L>  /  Alb  2.1<L>  /  TBili  0.8  /  DBili  x   /  AST  44<H>  /  ALT  121<H>  /  AlkPhos  197<H>  01-04    PT/INR - ( 04 Jan 2023 06:44 )   PT: 12.5 sec;   INR: 1.06 ratio           ASSESSMENT:  1.  DERRICK -- ischemic, septic ATN. Improving UO  2.  Acute respiratory failure and PEA arrest  3.  Large left sided tension pneumothorax s/p chest tube x 2  4.  Hypernatremia -> hyponatremia, hypervolemic  5.  Trending hypotension - probably cardiogenic in nature given hx of pre existing severe systolic dysfunction and dilated CM    PLAN:  Cont to trend Cr, UO  No immediate dialytic indications  May consider transferring to tertiary center with CRRT facility if this is within goals of care  Reduce enteral water to Q12h 200cc and follow repeat serum Na in 24h

## 2023-01-04 NOTE — PROGRESS NOTE ADULT - SUBJECTIVE AND OBJECTIVE BOX
INTERVAL HPI/OVERNIGHT EVENTS:  No new overnight event.  No N/V/D.  Tolerating diet via ngt  intuibated    Allergies    No Known Allergies    Intolerances    pork (Other)    General:  No wt loss, fevers, chills, night sweats, fatigue,   Eyes:  Good vision, no reported pain  ENT:  No sore throat, pain, runny nose, dysphagia  CV:  No pain, palpitations, hypo/hypertension  Resp:  No dyspnea, cough, tachypnea, wheezing  GI:  No pain, No nausea, No vomiting, No diarrhea, No constipation, No weight loss, No fever, No pruritis, No rectal bleeding, No tarry stools, No dysphagia,  :  No pain, bleeding, incontinence, nocturia  Muscle:  No pain, weakness  Neuro:  No weakness, tingling, memory problems  Psych:  No fatigue, insomnia, mood problems, depression  Endocrine:  No polyuria, polydipsia, cold/heat intolerance  Heme:  No petechiae, ecchymosis, easy bruisability  Skin:  No rash, tattoos, scars, edema      PHYSICAL EXAM:   Vital Signs:  Vital Signs Last 24 Hrs  T(C): 35 (2023 08:12), Max: 36.9 (2023 04:00)  T(F): 95 (2023 08:12), Max: 98.4 (2023 04:00)  HR: 75 (2023 09:07) (56 - 96)  BP: 135/83 (2023 08:00) (91/66 - 157/90)  BP(mean): 97 (2023 08:00) (75 - 110)  RR: 18 (2023 08:00) (14 - 30)  SpO2: 98% (2023 09:07) (89% - 100%)    Parameters below as of 2023 08:15  Patient On (Oxygen Delivery Method): ventilator      Daily     Daily Weight in k.9 (2023 06:00)I&O's Summary    2023 07:01  -  2023 07:00  --------------------------------------------------------  IN: 635.6 mL / OUT: 280 mL / NET: 355.6 mL        GENERAL:  Appears stated age, well-groomed, well-nourished, no distress  HEENT:  NC/AT,  conjunctivae clear and pink, no thyromegaly, nodules, adenopathy, no JVD, sclera -anicteric  CHEST:  Full & symmetric excursion, no increased effort, breath sounds clear  HEART:  Regular rhythm, S1, S2, no murmur/rub/S3/S4, no abdominal bruit, no edema  ABDOMEN:  Soft, non-tender, non-distended, normoactive bowel sounds,  no masses ,no hepato-splenomegaly, no signs of chronic liver disease  EXTEREMITIES:  no cyanosis,clubbing or edema  SKIN:  No rash/erythema/ecchymoses/petechiae/wounds/abscess/warm/dry  NEURO:  Alert, oriented, no asterixis, no tremor, no encephalopathy      LABS:                        11.0   14.94 )-----------( 151      ( 2023 06:43 )             33.4         133<L>  |  97  |  130<H>  ----------------------------<  107<H>  3.9   |  19<L>  |  6.35<H>    Ca    8.3<L>      2023 06:44    TPro  5.6<L>  /  Alb  2.1<L>  /  TBili  0.8  /  DBili  x   /  AST  44<H>  /  ALT  121<H>  /  AlkPhos  197<H>  -    PT/INR - ( 2023 06:44 )   PT: 12.5 sec;   INR: 1.06 ratio         PTT - ( 2023 18:54 )  PTT:32.2 sec    amylase   lipase  RADIOLOGY & ADDITIONAL TESTS:

## 2023-01-04 NOTE — PROGRESS NOTE ADULT - ASSESSMENT
REVIEW OF SYMPTOMS      Able to give (reliable) ROS  NO     PHYSICAL EXAM    HEENT Unremarkable  atraumatic   RESP Fair air entry EXP prolonged    Harsh breath sound Resp distres mild   CARDIAC S1 S2 No S3     NO JVD    ABDOMEN SOFT BS PRESENT NOT DISTENDED No hepatosplenomegaly   PEDAL EDEMA present No calf tenderness  NO rash       GENERAL DATA .   GOC.   12/23/2022 full code  ALLGY.     nka                  WT.     12/24/2022 81           BMI.       12/24/2022 26          ICU STAY. .. 12/23/2022  COVID. ..  12/23/2022 scv2 (-)   BEST PRACTICE ISSUES.    HOB ELEVATN. Yes  DVT PPLX. ..    12/23/2022 hpsc   WHITMORE PPLX. ..    12/23/2022 protonix 40   INFN PPLX. ..  12/23/2022 chlorhexidine .12%   SP SW VIVIAN.         DIET.  .. 12/26 nepro 960 ng    IV fl...  PROCEDURES...   12/23/2022  l chest tube   12/23/2022 intubated   12/23/2022 reed     PAST PROCEDURES.  .  12/25/2022 glucerna 960 ng .     ABGS.  1/3/2023 30% vent 731/32/70   12/25/2022 ac 40% 743/44/108   12/24/2022 ac 60% 746/41/190   12/23/2022 11 a 100% 705/95/68     VS/ PO/IO/ VENT/ DRIPS.   1/4/2023 afeb 80 120/70   1/4/2023 7p dexm .4 m/k/h   1/4/2023 7p dobu 5 m/k/m   1/4/2023 ac 20/400/5/.5     PREV ADMISSION 2/11-2/25/2022 NW P    AGE doa cc.  62 m doa 12/23/2022 resp distress    MAIN ISSUES.  CAC 12/23/2022 rosc 25 min   Intubation 12/23/2022  Vent mgmt 12/23/2022 -->  Weaning.  .. 12/28-1/2/2023  failed cpap    sedation.  .. dexm 12/30 -->   Pneumothorax.  .. 12/23/2022 cxr tension pntx-  .. ct 12/23 -->   Hemoptysis 1/3/2023   .. 1/3/2023 brb 1 tblsp   Infection.  Possible aspn pneum 12/23/2022  Possible uti 12/23/2022   .. ua 12/23/2022 w 26-50   .. mrsa 12/23 (+)   .. 12/24 et pseudo  .. 12/23 uc klebsiella   .. 12/23-1/1/2023 zosyn completd   Lacticemia.  CHF.  ..  1/3/2023 dobu -->   elevated lfts  .. Tredined down (anoxic hepatopathy )  DERRICK.  .. Cr 1/2/2023 Cr 5   Hypona   .. Na 1/2/2023 Na 130  Hypernatremia.  .. 12/24 - 1/2/2023 Na 147 - 130 Resolvd  Anoxic encephalo    OVERALL PLAN  VENT wean as told if fails then trach  HEMOPTUYSIS 5 d meropenem started 1/3   DERRICK HD when decided by renal   CHEST TUBE to be removed after trach or extubation which ever comes first     PMH  PMH COPD  PMH COVID (+) 2/11/2022   PMH S aureus bacteremia mssa 2/11   .. Ancef 2/12/2022 Dr Phan  PMH AICD  PMH HFREF  .. ECHO 11/20/2021 ef 20%  PMH A fib (on eliquis)         PROBLEMS ASSESSMENT RECOMMENDATIONS.  Intubation 12/23/2022  Vent mgmnt.   .. bundle dsv dsbt ltvv pplat 30 (-) PO2 60 (+) ph 7.3 (+)  sedation.  .. 12/23/2022 fentanyl 100.4p   .. 12/26/2022 is not requiring sedation (anoxic encephalo)   .. 12/30 dexmedetomid 400 in 100   .. 1/3/2023 midazolam 2.12p   Weaning.  .. 12/28-1/2/2023  failed cpap   .. 1/2/2023 suggest trach and will continue weaning after trach  COPD.  .. 12/23/2022 combivent .4     .. 12/23/2022 solumed 40.3 -> 12/24/2022 solumed 40   .. 12/23/2022 will taper to 40 on 12/24   .. cont rx  Hemoptysis 1/3/2023   .. 1/3/2023 brb 1 tblsp   .. 1/3/2023 7:18 PM dw Dr Awan    ........ Consider DDAVP   ........ CTS eval (she called Dr Borges)   ........ If bringing up more than 1 tbsp/h massive hemoptysis may need transfer to [facility] for [facility]   ........ Suggest culture and antibio for possible pneumonia   ........ Suggest venous duplex legs  bedside  (cannot do cta as derrick)   ........ Suggest CT ch   ........ 1/3/2023 7:35 PM dw ccpa he will check with pharmacy re correct dose of ddavp and start meropenem   .. 1/4/2023 hemoptysis stopped so possibly was sec local trauma   Code 12/23/2022    .. 12/23/2022 was  restless when he came in No defib  .. 12/23/2022 coded at 12.13 p pea arrest about 25 min rosc   .. 12/23/2022 monitor neuro recovery  .. 12/24/2022 eyes open gag (+) does not follow commands   Pneumothorax.  .. 12/23/2022 cxr tension pntx  .. 12/29/2022 ct ch unchanged small loculatd l pntx   decr abd gas and sc emphysema   .. 1/1/2023 plan is to remove ct after trach or after weaning off vent   Infection.   Possible aspn pneum 12/23/2022  Possible uti 12/23/2022   .. w 1/4/2023 w 14   .. W 12/23- 12/24-12/25-12/26-12/27-12/28/2022   w 9.6 - 21 - 20- 13 - 14 - 12   .. pr 12/24-12/25/2022 pr 31- 24     .. ua 12/23/2022 w 26-50   .. ct cap 12/23 l chest tube sm l pntx chr small loculated r pl effs mod retropneumoperitonuem cw barotrauma   .. flu ab 12/23/2022 (-)  .. rsv 12/23/2022 (-)   .. bc 12/23 (-)   .. mrsa 12/23 (+)   .. 12/24 et pseudo  .. 12/23 uc klebsiella   .. 12/23-1/1 zosyn completed  Hemoptysis 1/3   .. ro infection  .. 1/3 empiric meropenem started  .. 1/4/2023 will give 5 d course     CAD.  .. Tr 12/24-12/25/2022 Tr 259 -139  .. 12/23/2022 asa 81   .. monitor   Arrhythmia.  .. 12/30 amiodarone 200  CHF.  .. bnp 12/25-12/27/2022 70k - 51k   .. ECHO 11/20/2021 ef 20%  .. chf meds to be reintroduced by cardio as allowed by bp  .. 1/3/2023 2p dobu 500 in 250 5 m/k/m Dr KWAN)    elevated lfts.  .. LFTS 12/23-12/24-12/25-12/26-12/27-12/31/2022       - [phone]- 306- 287-250     - 2524 - 1766- 670- 212-55      - 2066 - 2405 - 1824- 1041-236   .. 12/23/2022  ct cap pneumoretroperitoneum cw barotrauma   .. 12/23/2022 check hep profile   .. 12/23/2022 follow serially  .. elevcated lfts likely sec to anoxic hepatopathy during cac   DERRICK.  .. Cr 1/2/2023 Cr 5.1  12/23-12/24-12/25-12/26-12/27-12/28-12/29-12/30-12/31/2022 - 1/3-1/4/2023   Cr 1.7- 3.1- 4 - 4.5 - 4.2-4 - 3.3- 3.8 - 3.8 - 5.7-6.3  .. uo 12/25-12/26/2022 400  - 500    .. fluids and serial monitoring  .. 12/25/2022 renal calld   .. 12/26/2022 seen Dr Escalante feels derrick sec ATN recommended d5w  Hyponatremia.  .. Na 1/2-1/4/2023 Na 130- 133     .. 1/2/2023 on ns   .. monitor   AMS.  .. 12/25/2022 remains unresponsive   .. 12/25/2022 Nurse tells me that no sedation is being required   .. 12/23/2022 ct head(-)   .. 12/25/2022 neuro consultd   .. 12/26/2022 pt seen by Dr Hyatt To repeat ct in 72h  .. 12/29/2022 pt is much more awake and is moving l arm    TIME SPENT   Over 39 minutes aggregate critical care time spent on encounter; activities included   direct patient care, counseling and/or coordinating care reviewing notes, lab data/ imaging , discussion with multidisciplinary team/ patient  /family and explaining in detail risks, benefits, alternatives  of the recommendations     BIPIN DE LA CRUZ 61 m 12/23/2022 1962 DR KELVIN VANESSA

## 2023-01-04 NOTE — PROGRESS NOTE ADULT - SUBJECTIVE AND OBJECTIVE BOX
[facility] VOSS    [facility]    Allergies    No Known Allergies    Intolerances    pork (Other)      PAST MEDICAL & SURGICAL HISTORY:  Heart failure, systolic      CAD (coronary artery disease)      Hypertension      Nonischemic cardiomyopathy      COPD, moderate      2019 novel coronavirus disease (COVID-19)      Substance abuse      HLD (hyperlipidemia)      Cardiac LV ejection fraction 10-20%      AICD (automatic cardioverter/defibrillator) present      Cardiac LV ejection fraction 10-20%          FAMILY HISTORY:  FH: lung cancer        Home Medications:  acetaminophen 325 mg oral tablet: 2 tab(s) orally every 6 hours, As needed, Temp greater or equal to 38C (100.4F), Mild Pain (1 - 3) (23 Dec 2022 10:07)  apixaban 5 mg oral tablet: 1 tab(s) orally every 12 hours (23 Dec 2022 10:07)  Aquaphor Healing topical ointment: Apply topically to affected area once a day (23 Dec 2022 10:07)  ascorbic acid 500 mg oral tablet: 1 tab(s) orally once a day (23 Dec 2022 10:07)  Bacid (LAC) oral capsule: 2 cap(s) orally once a day (23 Dec 2022 10:07)  bumetanide 2 mg oral tablet: 1 tab(s) orally 2 times a day (23 Dec 2022 10:07)  gabapentin 100 mg oral capsule: 1 cap(s) orally 3 times a day (23 Dec 2022 10:07)  melatonin 3 mg oral tablet: 1 tab(s) orally once a day (at bedtime), As needed, Insomnia (23 Dec 2022 10:07)  Multiple Vitamins with Minerals oral tablet: 1 tab(s) orally once a day (23 Dec 2022 10:07)  pantoprazole 40 mg oral delayed release tablet: 1 tab(s) orally once a day (before a meal) (23 Dec 2022 10:07)  sacubitril-valsartan 24 mg-26 mg oral tablet: 1 tab(s) orally 2 times a day (23 Dec 2022 10:07)  simethicone 80 mg oral tablet: 2 tab(s) orally every 6 hours, As Needed (23 Dec 2022 10:07)  spironolactone 25 mg oral tablet: 1 tab(s) orally once a day (23 Dec 2022 10:07)  Symbicort 160 mcg-4.5 mcg/inh inhalation aerosol: 2 puff(s) inhaled 2 times a day (23 Dec 2022 10:07)  Ventolin HFA 90 mcg/inh inhalation aerosol: 2 puff(s) inhaled every 6 hours, As Needed (23 Dec 2022 10:07)      MEDICATIONS  (STANDING):  albuterol    90 MICROgram(s) HFA Inhaler 2 Puff(s) Inhalation every 6 hours  albuterol/ipratropium for Nebulization. 3 milliLiter(s) Nebulizer once  aMIOdarone    Tablet 200 milliGRAM(s) Oral daily  aspirin  chewable 81 milliGRAM(s) Oral daily  chlorhexidine 0.12% Liquid 15 milliLiter(s) Oral Mucosa every 12 hours  dexMEDEtomidine Infusion 0.2 MICROgram(s)/kG/Hr (4.08 mL/Hr) IV Continuous <Continuous>  dextrose 5%. 1000 milliLiter(s) (100 mL/Hr) IV Continuous <Continuous>  dextrose 50% Injectable 25 Gram(s) IV Push once  dextrose 50% Injectable 12.5 Gram(s) IV Push once  dextrose 50% Injectable 25 Gram(s) IV Push once  DOBUTamine Infusion 5 MICROgram(s)/kG/Min (12.2 mL/Hr) IV Continuous <Continuous>  glucagon  Injectable 1 milliGRAM(s) IntraMuscular once  insulin lispro (ADMELOG) corrective regimen sliding scale   SubCutaneous every 6 hours  ipratropium 17 MICROgram(s) HFA Inhaler 1 Puff(s) Inhalation every 6 hours  lactulose Syrup 20 Gram(s) Oral two times a day  meropenem  IVPB 500 milliGRAM(s) IV Intermittent every 12 hours  pantoprazole  Injectable 40 milliGRAM(s) IV Push daily    MEDICATIONS  (PRN):  dextrose Oral Gel 15 Gram(s) Oral once PRN Blood Glucose LESS THAN 70 milliGRAM(s)/deciliter  fentaNYL    Injectable 25 MICROGram(s) IV Push every 2 hours PRN pain or vent synchrony  midazolam Injectable 2 milliGRAM(s) IV Push every 2 hours PRN Agitation      Diet, NPO with Tube Feed:   Tube Feeding Modality: Nasogastric  Nepro with Carb Steady  Total Volume for 24 Hours (mL): 960  Continuous  Starting Tube Feed Rate mL per Hour: 20  Increase Tube Feed Rate by (mL): 10     Every 4 hours  Until Goal Tube Feed Rate (mL per Hour): 40  Tube Feed Duration (in Hours): 24  Tube Feed Start Time: 13:00  Free Water Flush  Pump   Rate (mL per Hour): 30 (12-26-22 @ 23:12) [Active]          Vital Signs Last 24 Hrs  T(C): 36.9 (04 Jan 2023 04:00), Max: 36.9 (04 Jan 2023 04:00)  T(F): 98.4 (04 Jan 2023 04:00), Max: 98.4 (04 Jan 2023 04:00)  HR: 79 (04 Jan 2023 06:00) (53 - 96)  BP: 125/78 (04 Jan 2023 06:00) (87/65 - 157/90)  BP(mean): 91 (04 Jan 2023 06:00) (72 - 110)  RR: 20 (04 Jan 2023 06:00) (14 - 30)  SpO2: 98% (04 Jan 2023 06:00) (89% - 100%)    Parameters below as of 04 Jan 2023 06:00      O2 Concentration (%): 40      01-03-23 @ 07:01  -  01-04-23 @ 07:00  --------------------------------------------------------  IN: 635.6 mL / OUT: 250 mL / NET: 385.6 mL        Mode: AC/ CMV (Assist Control/ Continuous Mandatory Ventilation), RR (machine): 20, TV (machine): 400, FiO2: 40, PEEP: 5, ITime: 1, MAP: 12, PIP: 23      LABS:                        11.0   14.94 )-----------( 151      ( 04 Jan 2023 06:43 )             33.4     01-03    132<L>  |  96  |  126<H>  ----------------------------<  129<H>  4.0   |  20<L>  |  5.75<H>    Ca    8.1<L>      03 Jan 2023 06:00    TPro  5.1<L>  /  Alb  1.9<L>  /  TBili  0.8  /  DBili  x   /  AST  44<H>  /  ALT  130<H>  /  AlkPhos  205<H>  01-03    PT/INR - ( 04 Jan 2023 06:44 )   PT: 12.5 sec;   INR: 1.06 ratio         PTT - ( 03 Jan 2023 18:54 )  PTT:32.2 sec      ABG - ( 04 Jan 2023 06:33 )  pH, Arterial: 7.33  pH, Blood: x     /  pCO2: 32    /  pO2: 132   / HCO3: 17    / Base Excess: -9.0  /  SaO2: 99.8                WBC:  WBC Count: 14.94 K/uL (01-04 @ 06:43)  WBC Count: 13.61 K/uL (01-03 @ 18:54)  WBC Count: 12.21 K/uL (01-03 @ 06:00)  WBC Count: 11.89 K/uL (01-02 @ 17:00)      MICROBIOLOGY:  RECENT CULTURES:              PT/INR - ( 04 Jan 2023 06:44 )   PT: 12.5 sec;   INR: 1.06 ratio         PTT - ( 03 Jan 2023 18:54 )  PTT:32.2 sec    Sodium:  Sodium, Serum: 132 mmol/L (01-03 @ 06:00)  Sodium, Serum: 130 mmol/L (01-02 @ 17:00)      5.75 mg/dL 01-03 @ 06:00  5.10 mg/dL 01-02 @ 17:00      Hemoglobin:  Hemoglobin: 11.0 g/dL (01-04 @ 06:43)  Hemoglobin: 11.6 g/dL (01-03 @ 18:54)  Hemoglobin: 11.2 g/dL (01-03 @ 06:00)  Hemoglobin: 13.9 g/dL (01-02 @ 17:00)      Platelets: Platelet Count - Automated: 151 K/uL (01-04 @ 06:43)  Platelet Count - Automated: 157 K/uL (01-03 @ 18:54)  Platelet Count - Automated: 141 K/uL (01-03 @ 06:00)  Platelet Count - Automated: 152 K/uL (01-02 @ 17:00)      LIVER FUNCTIONS - ( 03 Jan 2023 06:00 )  Alb: 1.9 g/dL / Pro: 5.1 g/dL / ALK PHOS: 205 U/L / ALT: 130 U/L DA / AST: 44 U/L / GGT: x                 RADIOLOGY & ADDITIONAL STUDIES:      MICROBIOLOGY:  RECENT CULTURES:

## 2023-01-04 NOTE — PROGRESS NOTE ADULT - SUBJECTIVE AND OBJECTIVE BOX
Date/Time Patient Seen:  		  Referring MD:   Data Reviewed	       Patient is a 62y old  Male who presents with a chief complaint of resp failure (04 Jan 2023 09:27)      Subjective/HPI     PAST MEDICAL & SURGICAL HISTORY:  Heart failure, systolic    CAD (coronary artery disease)    Hypertension    Nonischemic cardiomyopathy    COPD, moderate    2019 novel coronavirus disease (COVID-19)    Substance abuse    HLD (hyperlipidemia)    Cardiac LV ejection fraction 10-20%    No significant past surgical history    AICD (automatic cardioverter/defibrillator) present    Cardiac LV ejection fraction 10-20%          Medication list         MEDICATIONS  (STANDING):  albuterol    90 MICROgram(s) HFA Inhaler 2 Puff(s) Inhalation every 6 hours  albuterol/ipratropium for Nebulization. 3 milliLiter(s) Nebulizer once  aMIOdarone    Tablet 200 milliGRAM(s) Oral daily  aspirin  chewable 81 milliGRAM(s) Oral daily  chlorhexidine 0.12% Liquid 15 milliLiter(s) Oral Mucosa every 12 hours  dexMEDEtomidine Infusion 0.2 MICROgram(s)/kG/Hr (4.08 mL/Hr) IV Continuous <Continuous>  dextrose 5%. 1000 milliLiter(s) (100 mL/Hr) IV Continuous <Continuous>  dextrose 50% Injectable 25 Gram(s) IV Push once  dextrose 50% Injectable 12.5 Gram(s) IV Push once  dextrose 50% Injectable 25 Gram(s) IV Push once  DOBUTamine Infusion 5 MICROgram(s)/kG/Min (12.2 mL/Hr) IV Continuous <Continuous>  glucagon  Injectable 1 milliGRAM(s) IntraMuscular once  insulin lispro (ADMELOG) corrective regimen sliding scale   SubCutaneous every 6 hours  ipratropium 17 MICROgram(s) HFA Inhaler 1 Puff(s) Inhalation every 6 hours  lactulose Syrup 20 Gram(s) Oral two times a day  meropenem  IVPB 500 milliGRAM(s) IV Intermittent every 12 hours  pantoprazole  Injectable 40 milliGRAM(s) IV Push daily    MEDICATIONS  (PRN):  dextrose Oral Gel 15 Gram(s) Oral once PRN Blood Glucose LESS THAN 70 milliGRAM(s)/deciliter  fentaNYL    Injectable 25 MICROGram(s) IV Push every 2 hours PRN pain or vent synchrony  midazolam Injectable 2 milliGRAM(s) IV Push every 2 hours PRN Agitation         Vitals log        ICU Vital Signs Last 24 Hrs  T(C): 35 (04 Jan 2023 08:12), Max: 36.9 (04 Jan 2023 04:00)  T(F): 95 (04 Jan 2023 08:12), Max: 98.4 (04 Jan 2023 04:00)  HR: 75 (04 Jan 2023 09:07) (56 - 96)  BP: 135/83 (04 Jan 2023 08:00) (91/66 - 157/90)  BP(mean): 97 (04 Jan 2023 08:00) (75 - 110)  ABP: --  ABP(mean): --  RR: 18 (04 Jan 2023 08:00) (14 - 30)  SpO2: 98% (04 Jan 2023 09:07) (89% - 100%)    O2 Parameters below as of 04 Jan 2023 08:15  Patient On (Oxygen Delivery Method): ventilator             Mode: CPAP with PS  FiO2: 40  PEEP: 5  PS: 10  ITime: 1  MAP: 10  PIP: 15      Input and Output:  I&O's Detail    03 Jan 2023 07:01  -  04 Jan 2023 07:00  --------------------------------------------------------  IN:    Dexmedetomidine: 140.4 mL    DOBUTamine: 195.2 mL    IV PiggyBack: 150 mL    sodium chloride 0.9%: 150 mL  Total IN: 635.6 mL    OUT:    Chest Tube (mL): 5 mL    Chest Tube (mL): 25 mL    Indwelling Catheter - Urethral (mL): 250 mL    Nepro with Carb Steady: 0 mL  Total OUT: 280 mL    Total NET: 355.6 mL          Lab Data                        11.0   14.94 )-----------( 151      ( 04 Jan 2023 06:43 )             33.4     01-04    133<L>  |  97  |  130<H>  ----------------------------<  107<H>  3.9   |  19<L>  |  6.35<H>    Ca    8.3<L>      04 Jan 2023 06:44    TPro  5.6<L>  /  Alb  2.1<L>  /  TBili  0.8  /  DBili  x   /  AST  44<H>  /  ALT  121<H>  /  AlkPhos  197<H>  01-04    ABG - ( 04 Jan 2023 06:33 )  pH, Arterial: 7.33  pH, Blood: x     /  pCO2: 32    /  pO2: 132   / HCO3: 17    / Base Excess: -9.0  /  SaO2: 99.8                    Review of Systems	      Objective     Physical Examination    heart s1s2  lung dec BS  head nc      Pertinent Lab findings & Imaging      Leo:  NO   Adequate UO     I&O's Detail    03 Jan 2023 07:01  -  04 Jan 2023 07:00  --------------------------------------------------------  IN:    Dexmedetomidine: 140.4 mL    DOBUTamine: 195.2 mL    IV PiggyBack: 150 mL    sodium chloride 0.9%: 150 mL  Total IN: 635.6 mL    OUT:    Chest Tube (mL): 5 mL    Chest Tube (mL): 25 mL    Indwelling Catheter - Urethral (mL): 250 mL    Nepro with Carb Steady: 0 mL  Total OUT: 280 mL    Total NET: 355.6 mL               Discussed with:     Cultures:	        Radiology

## 2023-01-04 NOTE — PROGRESS NOTE ADULT - SUBJECTIVE AND OBJECTIVE BOX
Patient is a 62y old  Male who presents with a chief complaint of resp failure (26 Dec 2022 09:47)    HPI: 62M CAD, Dilated cardiomyopathy, S/p AICD, Afib/flutter, h/o substance abuse, CKD baseline creat approx 1.4 last admitted to [facility] with MSSA bacteremia, and CHF.  Sent from [facility] with acute hypoxic respiratory failure.  Was initially on BiPAP then became hypoxic.  Anesthesia intubated patient.  On post intubation Xray pneumothorax evident.  Patient with PEA arrest.  Chest tube placed by ED physician.  ROSC obtained.     Patient unable to give further history.      Intetrval history -     Intubated on vent.   On C pap   Opens eyes.  Makes eye contract.   Moves left hand and foot on command.    MEDICATIONS  (STANDING):    albuterol    90 MICROgram(s) HFA Inhaler 2 Puff(s) Inhalation every 6 hours  albuterol/ipratropium for Nebulization. 3 milliLiter(s) Nebulizer once  aMIOdarone    Tablet 200 milliGRAM(s) Oral daily  aspirin  chewable 81 milliGRAM(s) Oral daily  chlorhexidine 0.12% Liquid 15 milliLiter(s) Oral Mucosa every 12 hours  dexMEDEtomidine Infusion 0.2 MICROgram(s)/kG/Hr (4.08 mL/Hr) IV Continuous <Continuous>  dextrose 5%. 1000 milliLiter(s) (100 mL/Hr) IV Continuous <Continuous>  dextrose 50% Injectable 12.5 Gram(s) IV Push once  dextrose 50% Injectable 25 Gram(s) IV Push once  dextrose 50% Injectable 25 Gram(s) IV Push once  DOBUTamine Infusion 5 MICROgram(s)/kG/Min (12.2 mL/Hr) IV Continuous <Continuous>  glucagon  Injectable 1 milliGRAM(s) IntraMuscular once  insulin lispro (ADMELOG) corrective regimen sliding scale   SubCutaneous every 6 hours  ipratropium 17 MICROgram(s) HFA Inhaler 1 Puff(s) Inhalation every 6 hours  lactulose Syrup 20 Gram(s) Oral two times a day  meropenem  IVPB 500 milliGRAM(s) IV Intermittent every 12 hours  pantoprazole  Injectable 40 milliGRAM(s) IV Push daily    MEDICATIONS  (PRN):    dextrose Oral Gel 15 Gram(s) Oral once PRN Blood Glucose LESS THAN 70 milliGRAM(s)/deciliter  fentaNYL    Injectable 25 MICROGram(s) IV Push every 2 hours PRN pain or vent synchrony  midazolam Injectable 2 milliGRAM(s) IV Push every 2 hours PRN Agitation    Allergies    No Known Allergies    Intolerances    pork (Other)    REVIEW OF SYSTEMS: UTO    PHYSICAL EXAM:    Vital Signs Last 24 Hrs    T(C): 35 (04 Jan 2023 08:12), Max: 36.9 (04 Jan 2023 04:00)  T(F): 95 (04 Jan 2023 08:12), Max: 98.4 (04 Jan 2023 04:00)  HR: 72 (04 Jan 2023 11:38) (56 - 96)  BP: 135/85 (04 Jan 2023 11:00) (97/66 - 157/90)  BP(mean): 100 (04 Jan 2023 11:00) (77 - 110)  RR: 11 (04 Jan 2023 11:00) (11 - 30)  SpO2: 98% (04 Jan 2023 11:38) (89% - 100%)    Parameters below as of 04 Jan 2023 08:15  Patient On (Oxygen Delivery Method): ventilator    On Neurological Examination:    Intubated on vent.  Not on any sedation.  On C pap   Pt seem to make eye contact at times.  Pupils are 3 mm, sluggishly reacting to light.  Neck is supple.  Able to move left hand and foot some what. No movements seen on right hand or foot. Right hand is swollen.   No abn body movements.    LABS:    CBC Full  -  ( 04 Jan 2023 06:43 )  WBC Count : 14.94 K/uL  RBC Count : 3.65 M/uL  Hemoglobin : 11.0 g/dL  Hematocrit : 33.4 %  Platelet Count - Automated : 151 K/uL  Mean Cell Volume : 91.5 fl  Mean Cell Hemoglobin : 30.1 pg  Mean Cell Hemoglobin Concentration : 32.9 gm/dL    01-04    133<L>  |  97  |  130<H>  ----------------------------<  107<H>  3.9   |  19<L>  |  6.35<H>    Ca    8.3<L>      04 Jan 2023 06:44    TPro  5.6<L>  /  Alb  2.1<L>  /  TBili  0.8  /  DBili  x   /  AST  44<H>  /  ALT  121<H>  /  AlkPhos  197<H>  01-04    RADIOLOGY & ADDITIONAL STUDIES:    < from: TTE Echo Complete w/o Contrast w/ Doppler (02.13.22 @ 09:00) >    1. Severe left ventricular systolic dysfunction. EF 10 %  2. Dilated left ventricle, left atrium, right atrium.  3. Moderate mitral regurgitation.  4. Moderate tricuspid regurgitation.  5. Mild pulmonary hypertension.  6. No evidence of endocarditis on this transthoracic study.    < end of copied text >    r< from: CT Head No Cont (12.29.22 @ 13:20) >    IMPRESSION: No acute intracranial hemorrhage, mass effect, or shift of the midline structures.    Similar-appearing mild chronic white matter microvascular type changes.    < end of copied text >    < from: CT Head No Cont (12.23.22 @ 21:18) >    IMPRESSION:    No large territory acute infarct, intracranial hemorrhage, or mass effect.    Slight progression of chronic microangiopathic white matter changes as compared to prior study from 2016.    < end of copied text >    < from: CT Chest No Cont (12.23.22 @ 21:19) >    IMPRESSION:    *  Left chest tube with residual small left pneumothorax. No evidence of tension.  *  Right basilar rounded atelectasis again noted with chronic small loculated right pleural effusion.  *  Small pneumomediastinum. Left chest and abdominal wall emphysema. Moderate pneumoretroperitoneum and properitoneal air. No pneumoperitoneum. Findings are compatible with barotrauma.  *  Evidence of urinary bladder cystitis.    < end of copied text >

## 2023-01-04 NOTE — PROGRESS NOTE ADULT - ASSESSMENT
The patient is a 62 year old male with a history of CAD, chronic systolic heart failure s/p ICD, atrial flutter s/p DCCV, substance abuse, CKD who is admitted with respiratory failure in the setting of tension pneumothorax complicated by cardiac arrest.    Plan:  - ECG with sinus tachycardia and known LBBB  - Echo 2/22 with severely reduced LV (EF 10%) and RV function, mod MR, mod TR, mild pulm HTN  - When extubated and BP trends up, will attempt to resume metoprolol succinate. Not a candidate for ACEI/ARB/ARNI at the current time due to renal function.  - Poor renal function - hold apixaban  - Hold bumetanide and spironolactone due to DERRICK  - Continue amiodarone 200 mg daily  - Continue aspirin 81 mg daily  - Renal function continues to worsen - may need RRT - renal follow-up  - Continue dobutamine 5 mcg/kg/min to see if increased inotropy will help with renal perfusion  - Chest tubes in place  - Mechanical ventilation  - ICU care  - Overall poor prognosis

## 2023-01-04 NOTE — PROGRESS NOTE ADULT - ASSESSMENT
62M CAD, Dilated cardiomyopathy, S/p AICD, Afib/flutter, h/o substance abuse,  presented with acute hypoxic and hypercarbic respiratory failure associated with pneumothorax which lled to cardiac arrest       Acute respiratory failure secondary to tension pneumothorax with leading to cardiac arrest   - continue vent management;  weaning vs. trach .  ENT aware of poss trach.   - continue chest tubes to suction for now.   - s/p Zosyn total 10 days.   - monitor for signs of bleeding    hypernatremia  - resolved    Cardiomyopathy  - current cardiac issues appear to be secondary to pulm issues at this time  - hold eliquis pending possible further procedures and hospital course    DM2  - not on meds at SNF  - FS monitoring with lispro coverage scale while critically ill    DERRICK on CKD  - Likely ATN due to above, possible HF as well  - monitor creatinine,   - avoid nephrotoxic agents  - now oliguric    Prophylactic measure  - DVT proph: heparin SQ for now  - GI proph: protonix

## 2023-01-04 NOTE — PROGRESS NOTE ADULT - SUBJECTIVE AND OBJECTIVE BOX
Chief Complaint: Respiratory failure    Interval Events: No events overnight.    Review of Systems:  Unable to obtain    Physical Exam:  Vital Signs Last 24 Hrs  T(C): 35 (04 Jan 2023 08:12), Max: 36.9 (04 Jan 2023 04:00)  T(F): 95 (04 Jan 2023 08:12), Max: 98.4 (04 Jan 2023 04:00)  HR: 75 (04 Jan 2023 09:07) (56 - 96)  BP: 135/81 (04 Jan 2023 09:00) (91/66 - 157/90)  BP(mean): 97 (04 Jan 2023 09:00) (75 - 110)  RR: 24 (04 Jan 2023 09:00) (14 - 30)  SpO2: 98% (04 Jan 2023 09:07) (89% - 100%)  Parameters below as of 04 Jan 2023 08:15  Patient On (Oxygen Delivery Method): ventilator  General: Sedated  HEENT: Intubated  Neck: No JVD, no carotid bruit  Lungs: CTAB  CV: RRR, nl S1/S2, no M/R/G  Abdomen: S/NT/ND, +BS  Extremities: No LE edema, no cyanosis  Neuro: AAOx0  Skin: No rash    Labs:    01-04    133<L>  |  97  |  130<H>  ----------------------------<  107<H>  3.9   |  19<L>  |  6.35<H>    Ca    8.3<L>      04 Jan 2023 06:44    TPro  5.6<L>  /  Alb  2.1<L>  /  TBili  0.8  /  DBili  x   /  AST  44<H>  /  ALT  121<H>  /  AlkPhos  197<H>  01-04                        11.0   14.94 )-----------( 151      ( 04 Jan 2023 06:43 )             33.4       ECG/Telemetry: Sinus rhythm

## 2023-01-05 NOTE — PROVIDER CONTACT NOTE (EICU) - BACKGROUND
Bruno was admitted with resp failure, chronic history of cardiac disease- cardiomyopathy, has AICD, post cardiac arrest. has chest tubes due to PTX.

## 2023-01-05 NOTE — PROGRESS NOTE ADULT - ASSESSMENT
63 y/o M with PMHx of HTN, dilated cardiomyopathy s/p AICD, CKD, opoid/substance abuse, and a fib/a flutter admitted with:    Cardiac arrest, acute hypoxic respiratory failure, aspiration PNA, left-sided PTX, DERRICK, metabolic encephalopathy, transaminitis    PLAN:  - Sedated with precedex for ventilator synchrony and patient comfort.  - Fentanyl and versed PRN for agitation.  - Dobutamine initiated by cardiology in an attempt to improved urine output. EF 10%/severely reduced systolic function in 2/2022.  - Hemodynamically stable.  - Actively titrating ventilator settings to maintain SpO2 > 92% and adequate minute ventilation.  - Tolerating pressure support.  - Chest tubes remain on suction.  - GI prophylaxis with protonix.  - Tube feeds as tolerated.  - Renal function downtrending. Urine output improving.  - Meropenem re-started on 1/3 for pneumonia.  - Plan to re-start chemical DVT prophylaxis tomorrow if no further bleeding from ET tube.  - Ethics and palliative care following. Pending 2 MD decision for comfort care?

## 2023-01-05 NOTE — PROGRESS NOTE ADULT - SUBJECTIVE AND OBJECTIVE BOX
Patient is a 61 y/o male who presents with a chief complaint of respiratory failure (05 Jan 2023 18:38)    BRIEF HOSPITAL COURSE: 61 y/o M with PMHx of HTN, dilated cardiomyopathy s/p AICD, CKD, opoid/substance abuse, and a fib/a flutter who presented to ER on 12/23 with shortness of breath and altered mental status from nursing home. Initially placed on NIPPV, but ultimately required intubation in the setting of worsening hypoxemia. Intubation complicated by left-sided PTX, and subsequent PEA cardiac arrest. Chest tube placed emergently by ER physician, and ROSC was achieved. Pt admitted to [facility] for continuation of care. Hospital course further complicated by aspiration PNA, shock, DERRICK, and recurrent left-sided PTX requiring emergent pigtail placement on 1/3.    Events last 24 hours: Agitated this evening, dyssynchronous with ventilator. Evaluated by eICU. Placed on PS 15/5, which he seemed to tolerate better. Appears comfortable. Sedated with precedex.    PAST MEDICAL & SURGICAL HISTORY:  Heart failure, systolic  CAD (coronary artery disease)  Hypertension  Nonischemic cardiomyopathy  COPD, moderate  2019 novel coronavirus disease (COVID-19)  Substance abuse  HLD (hyperlipidemia)  Cardiac LV ejection fraction 10-20%  AICD (automatic cardioverter/defibrillator) present  Cardiac LV ejection fraction 10-20%    Review of Systems:  Unable to obtain. Intubated/sedated.    Medications:  meropenem  IVPB 500 milliGRAM(s) IV Intermittent every 12 hours  aMIOdarone    Tablet 200 milliGRAM(s) Oral daily  DOBUTamine Infusion 5 MICROgram(s)/kG/Min IV Continuous <Continuous>  albuterol    90 MICROgram(s) HFA Inhaler 2 Puff(s) Inhalation every 6 hours  albuterol/ipratropium for Nebulization. 3 milliLiter(s) Nebulizer once  ipratropium 17 MICROgram(s) HFA Inhaler 1 Puff(s) Inhalation every 6 hours  dexMEDEtomidine Infusion 0.2 MICROgram(s)/kG/Hr IV Continuous <Continuous>  fentaNYL    Injectable 25 MICROGram(s) IV Push every 2 hours PRN  midazolam Injectable 2 milliGRAM(s) IV Push every 2 hours PRN  aspirin  chewable 81 milliGRAM(s) Oral daily  pantoprazole  Injectable 40 milliGRAM(s) IV Push daily  dextrose 50% Injectable 25 Gram(s) IV Push once  dextrose 50% Injectable 12.5 Gram(s) IV Push once  dextrose 50% Injectable 25 Gram(s) IV Push once  dextrose Oral Gel 15 Gram(s) Oral once PRN  glucagon  Injectable 1 milliGRAM(s) IntraMuscular once  insulin lispro (ADMELOG) corrective regimen sliding scale   SubCutaneous every 6 hours  dextrose 5%. 1000 milliLiter(s) IV Continuous <Continuous>  chlorhexidine 0.12% Liquid 15 milliLiter(s) Oral Mucosa every 12 hours    Mode: CPAP with PS  FiO2: 30  PEEP: 5  PS: 15  MAP: 11  PIP: 21    ICU Vital Signs Last 24 Hrs  T(C): 36.7 (05 Jan 2023 16:02), Max: 37 (05 Jan 2023 04:06)  T(F): 98 (05 Jan 2023 16:02), Max: 98.6 (05 Jan 2023 04:06)  HR: 85 (05 Jan 2023 19:00) (79 - 123)  BP: 142/90 (05 Jan 2023 19:00) (132/83 - 146/94)  BP(mean): 105 (05 Jan 2023 19:00) (97 - 112)  ABP: --  ABP(mean): --  RR: 15 (05 Jan 2023 19:00) (13 - 29)  SpO2: 96% (05 Jan 2023 19:00) (94% - 99%)    O2 Parameters below as of 05 Jan 2023 19:00  Patient On (Oxygen Delivery Method): ventilator    O2 Concentration (%): 30    ABG - ( 04 Jan 2023 06:33 )  pH, Arterial: 7.33  pH, Blood: x     /  pCO2: 32    /  pO2: 132   / HCO3: 17    / Base Excess: -9.0  /  SaO2: 99.8      I&O's Detail    04 Jan 2023 07:01  -  05 Jan 2023 07:00  --------------------------------------------------------  IN:    Dexmedetomidine: 205 mL    DOBUTamine: 305 mL    Enteral Tube Flush: 500 mL    IV PiggyBack: 100 mL    Nepro with Carb Steady: 710 mL  Total IN: 1820 mL    OUT:    Chest Tube (mL): 0 mL    Chest Tube (mL): 70 mL    Indwelling Catheter - Urethral (mL): 1000 mL  Total OUT: 1070 mL    Total NET: 750 mL    05 Jan 2023 07:01  -  05 Jan 2023 20:17  --------------------------------------------------------  IN:    Dexmedetomidine: 175.9 mL    DOBUTamine: 195.2 mL  Total IN: 371.1 mL    OUT:    Chest Tube (mL): 40 mL    Chest Tube (mL): 5 mL    Indwelling Catheter - Urethral (mL): 700 mL  Total OUT: 745 mL    Total NET: -373.9 mL    LABS:                        10.5   14.54 )-----------( 180      ( 05 Jan 2023 06:17 )             30.8     01-05    134<L>  |  99  |  120<H>  ----------------------------<  223<H>  3.7   |  20<L>  |  5.49<H>    Ca    7.9<L>      05 Jan 2023 06:17  Phos  7.3     01-05  Mg     2.4     01-05    TPro  5.6<L>  /  Alb  2.1<L>  /  TBili  0.8  /  DBili  x   /  AST  44<H>  /  ALT  121<H>  /  AlkPhos  197<H>  01-04    CAPILLARY BLOOD GLUCOSE    POCT Blood Glucose.: 143 mg/dL (05 Jan 2023 17:22)    PT/INR - ( 04 Jan 2023 06:44 )   PT: 12.5 sec;   INR: 1.06 ratio      CULTURES:    Physical Examination:    General: In no acute distress.    HEENT: Pupils equal, reactive to light. Symmetric. No scleral icterus or injection.    PULM: Diminished breath sounds b/l.    NECK: Supple, no lymphadenopathy, trachea midline.    CVS: Regular rate and rhythm, no murmurs appreciated, +s1/s2.    ABD: Soft, nondistended, nontender, normoactive bowel sounds.    EXT: No edema, nontender.    SKIN: Warm and well perfused, no rashes noted.    NEURO: Sedated.    RADIOLOGY:  < from: Xray Chest 1 View- PORTABLE-Routine (Xray Chest 1 View- PORTABLE-Routine in AM.) (01.04.23 @ 06:31) >    ACC: [id number] EXAM:  XR CHEST PORTABLE URGENT 1V                        ACC: [id number] EXAM:  XR CHEST PORTABLE ROUTINE 1V                        ACC: [id number] EXAM:  XR CHEST PORTABLE ROUTINE 1V                          PROCEDURE DATE:  01/03/2023    INTERPRETATION:  HISTORY: Admitting Dxs: J96.01 RESP DISTRESS; ; NGT   Placement;  TECHNIQUE: Serial portable chest x-rays, 3 studies.  COMPARISON: 1:35 AM.  FINDINGS/  IMPRESSION:    First study is from 1/20/2023 9:34 AM and shows an enlarged heart. The   endotracheal tube is above the jennie. A cardiac device overlies and   obscures the left hemithorax with lead in place.. The nasogastric tube   courses below the left hemidiaphragm, tip off edge of film. There is a   right basilar loculated effusion. And basilar infiltrate. A left-sided   chest tube is seen. A pigtail catheter is seen at the left apex obscured   by the cardiac device. There is no pneumothorax. Subcutaneous emphysema   seen over the left chest.    The follow-up is from 1/3/2023 at 1:35 AM (2 films) and shows no   significant change..    The follow-up is from 1/4/2023  and shows no significant change.    --- End of Report ---    < end of copied text >    CRITICAL CARE TIME SPENT: 36 minutes

## 2023-01-05 NOTE — PROGRESS NOTE ADULT - ASSESSMENT
62M CAD, Dilated cardiomyopathy, S/p AICD, Afib/flutter, h/o substance abuse,  presented with acute hypoxic and hypercarbic respiratory failure associated with pneumothorax.      ptx  resp failure  arnoldo hx  CM  AICD  AF    ethics consult noted - agree with conclusion - prognosis poor and 2 MD certification can designate DNR - Comfort Care    no immediate family  friends only on listed number  no decision making capacity from friends  may need guardianship and decision from 2 MD's

## 2023-01-05 NOTE — PROGRESS NOTE ADULT - ASSESSMENT
REVIEW OF SYMPTOMS      Able to give (reliable) ROS  NO     PHYSICAL EXAM    HEENT Unremarkable  atraumatic   RESP Fair air entry EXP prolonged    Harsh breath sound Resp distres mild   CARDIAC S1 S2 No S3     NO JVD    ABDOMEN SOFT BS PRESENT NOT DISTENDED No hepatosplenomegaly   PEDAL EDEMA present No calf tenderness  NO rash       GENERAL DATA .   GOC.   12/23/2022 full code  ALLGY.     nka                  WT.     12/24/2022 81           BMI.       12/24/2022 26          ICU STAY. .. 12/23/2022  COVID. ..  12/23/2022 scv2 (-)   BEST PRACTICE ISSUES.    HOB ELEVATN. Yes  DVT PPLX. ..    12/23/2022 hpsc   WHITMORE PPLX. ..    12/23/2022 protonix 40   INFN PPLX. ..  12/23/2022 chlorhexidine .12%   SP SW VIVIAN.         DIET.  .. 12/26 nepro 960 ng    IV fl...  PROCEDURES...   12/23/2022  l chest tube   12/23/2022 intubated   12/23/2022 reed     PAST PROCEDURES.  .  12/25/2022 glucerna 960 ng .     ABGS.  1/3/2023 30% vent 731/32/70   12/25/2022 ac 40% 743/44/108   12/24/2022 ac 60% 746/41/190   12/23/2022 11 a 100% 705/95/68     VS/ PO/IO/ VENT/ DRIPS.   1/5/2023 100 130/90   1/5/2023 6p dobu 5 m/k/m  1/5/2023 5p dexm .7 m/k/h    PREV ADMISSION 2/11-2/25/2022 [facility] P    AGE doa cc.  62 m doa 12/23/2022 resp distress    PMH  PMH COPD  PMH COVID (+) 2/11/2022   PMH S aureus bacteremia mssa 2/11   .. Ancef 2/12/2022 Dr Phan  PMH AICD  PMH HFREF  .. ECHO 11/20/2021 ef 20%  PMH A fib (on eliquis)     OVERALL PLAN  VENT   .. bundle dsv dsbt ltvv pplat 30 (-) PO2 60 (+) ph 7.3 (+)  .. lung protective ventilatn   .. wean as told if fails then trach  PROLONGED INTUBATN.  .. Plan trach vs GOC determination   SEDATION.  .. dexm 12/30 -->   .. target rass 0 to (-) 1   HEMOPTYSIS   .. 1/3 given ddavp 25  .. 5 d meropenem started 1/3   .. 1/5/2023 no further hemoptysis   DERRICK   .. HD when decided by renal   CHEST TUBE   .. 2nd chest tube placed on 1/3 because of worsening pntx   .. to be removed after trach or extubation which ever comes first   CHF.  .. On docu started 1/3      PROBLEMS ASSESSMENT RECOMMENDATIONS.  Intubation 12/23/2022  Vent mgmnt.   .. bundle dsv dsbt ltvv pplat 30 (-) PO2 60 (+) ph 7.3 (+)  .. lung protective ventilatn   sedation.  .. 12/23/2022 fentanyl 100.4p   .. 12/30 dexmedetomid 400 in 100   .. 1/3/2023 midazolam 2.12p   Weaning.  .. 12/28-1/2/2023  failed cpap   .. 1/2/2023 suggest trach and will continue weaning after trach  .. goc determination being done   COPD.  .. 12/23/2022 combivent .4     .. 12/23/2022 solumed 40.3 -> 12/24/2022 solumed 40   .. 12/23/2022 will taper to 40 on 12/24   .. cont rx  Hemoptysis 1/3/2023   .. 1/3/2023 brb 1 tblsp   .. 1/4/2023 hemoptysis stopped so possibly was sec local trauma   Code 12/23/2022    .. 12/23/2022 was  restless when he came in No defib  .. 12/23/2022 coded at 12.13 p pea arrest about 25 min rosc   .. 12/23/2022 monitor neuro recovery  .. 12/24/2022 eyes open gag (+) does not follow commands   Pneumothorax.  .. 12/23/2022 cxr tension pntx  .. 12/29/2022 ct ch unchanged small loculatd l pntx   decr abd gas and sc emphysema   .. 1/1/2023 plan is to remove ct after trach or after weaning off vent   .. 1/3 needed second chest tube   Infection.   Possible aspn pneum 12/23/2022  Possible uti 12/23/2022   .. flu ab 12/23/2022 (-)  .. rsv 12/23/2022 (-)   .. bc 12/23 (-)   .. mrsa 12/23 (+)   .. 12/24 et pseudo  .. 12/23 uc klebsiella   .. 12/23-1/1 zosyn completed  Hemoptysis 1/3   .. ro infection  .. 1/3 empiric meropenem started  .. 1/4/2023 will give 5 d course     CAD.  .. Tr 12/24-12/25/2022 Tr 259 -139  .. 12/23/2022 asa 81   .. monitor   Arrhythmia.  .. 12/30 amiodarone 200  CHF.  .. bnp 12/25-12/27/2022 70k - 51k   .. ECHO 11/20/2021 ef 20%  .. chf meds to be reintroduced by cardio as allowed by bp  .. 1/3/2023 2p dobu 500 in 250 5 m/k/m Dr KWAN)    elevated lfts.  .. LFTS 12/23-12/24-12/25-12/26-12/27-12/31/2022       - [phone]- 306- 287-250     - 2524 - 1766- 670- 212-55      - 2066 - 2405 - 1824- 1041-236   .. 12/23/2022  ct cap pneumoretroperitoneum cw barotrauma   .. 12/23/2022 check hep profile   .. 12/23/2022 follow serially  .. elevcated lfts likely sec to anoxic hepatopathy during cac   DERRICK.  .. Cr 1/2/2023 Cr 5.1  12/23-12/24-12/25-12/26-12/27-12/28-12/29-12/30-12/31/2022 - 1/3-1/4/2023   Cr 1.7- 3.1- 4 - 4.5 - 4.2-4 - 3.3- 3.8 - 3.8 - 5.7-6.3  .. uo 12/25-12/26/2022 400  - 500    .. fluids and serial monitoring  .. 12/25/2022 renal calld   .. 12/26/2022 seen Dr Escalante feels derrick sec ATN recommended d5w  Hyponatremia.  .. Na 1/2-1/4/2023 Na 130- 133     .. 1/2/2023 on ns   .. monitor   AMS.  .. 12/25/2022 remains unresponsive   .. 12/25/2022 Nurse tells me that no sedation is being required   .. 12/23/2022 ct head(-)   .. 12/25/2022 neuro consultd   .. 12/26/2022 pt seen by Dr Hyatt To repeat ct in 72h  .. 12/29/2022 pt is much more awake and is moving l arm    TIME SPENT   Over 39 minutes aggregate critical care time spent on encounter; activities included   direct patient care, counseling and/or coordinating care reviewing notes, lab data/ imaging , discussion with multidisciplinary team/ patient  /family and explaining in detail risks, benefits, alternatives  of the recommendations     BIPIN DE LA CRUZ 61 m 12/23/2022 1962 DR KELVIN VANESSA

## 2023-01-05 NOTE — PROGRESS NOTE ADULT - SUBJECTIVE AND OBJECTIVE BOX
DOROTHY VOSS is a 62yMale , patient examined and chart reviewed.     INTERVAL HPI/ OVERNIGHT EVENTS:   No events.  Afebrile. Remains Vented sedated.      PAST MEDICAL & SURGICAL HISTORY:  Heart failure, systolic  CAD (coronary artery disease)  Hypertension  Nonischemic cardiomyopathy  COPD, moderate  2019 novel coronavirus disease (COVID-19)  Substance abuse  HLD (hyperlipidemia)  Cardiac LV ejection fraction 10-20%  AICD (automatic cardioverter/defibrillator) present  Cardiac LV ejection fraction 10-20%        For details regarding the patient's social history, family history, and other miscellaneous elements, please refer the initial infectious diseases consultation and/or the admitting history and physical examination for this admission.    ROS:  Unable to obtain due to : pt's condition    ALLERGIES  pork (Other)      Current inpatient medications :    ANTIBIOTICS/RELEVANT:  meropenem  IVPB 500 milliGRAM(s) IV Intermittent every 12 hours    MEDICATIONS  (STANDING):  albuterol    90 MICROgram(s) HFA Inhaler 2 Puff(s) Inhalation every 6 hours  albuterol/ipratropium for Nebulization. 3 milliLiter(s) Nebulizer once  aMIOdarone    Tablet 200 milliGRAM(s) Oral daily  aspirin  chewable 81 milliGRAM(s) Oral daily  chlorhexidine 0.12% Liquid 15 milliLiter(s) Oral Mucosa every 12 hours  dexMEDEtomidine Infusion 0.2 MICROgram(s)/kG/Hr (4.08 mL/Hr) IV Continuous <Continuous>  dextrose 5%. 1000 milliLiter(s) (100 mL/Hr) IV Continuous <Continuous>  dextrose 50% Injectable 25 Gram(s) IV Push once  dextrose 50% Injectable 12.5 Gram(s) IV Push once  dextrose 50% Injectable 25 Gram(s) IV Push once  DOBUTamine Infusion 5 MICROgram(s)/kG/Min (12.2 mL/Hr) IV Continuous <Continuous>  glucagon  Injectable 1 milliGRAM(s) IntraMuscular once  insulin lispro (ADMELOG) corrective regimen sliding scale   SubCutaneous every 6 hours  ipratropium 17 MICROgram(s) HFA Inhaler 1 Puff(s) Inhalation every 6 hours  pantoprazole  Injectable 40 milliGRAM(s) IV Push daily    MEDICATIONS  (PRN):  dextrose Oral Gel 15 Gram(s) Oral once PRN Blood Glucose LESS THAN 70 milliGRAM(s)/deciliter  fentaNYL    Injectable 25 MICROGram(s) IV Push every 2 hours PRN pain or vent synchrony  midazolam Injectable 2 milliGRAM(s) IV Push every 2 hours PRN Agitation        Objective:  Vital Signs Last 24 Hrs  T(C): 36.7 (05 Jan 2023 12:32), Max: 37 (05 Jan 2023 04:06)  T(F): 98 (05 Jan 2023 12:32), Max: 98.6 (05 Jan 2023 04:06)  HR: 88 (05 Jan 2023 15:30) (71 - 93)  BP: 140/97 (05 Jan 2023 15:30) (127/77 - 146/124)  BP(mean): 109 (05 Jan 2023 15:30) (92 - 133)  RR: 19 (05 Jan 2023 15:30) (13 - 30)  SpO2: 96% (05 Jan 2023 15:30) (95% - 100%)    Parameters below as of 05 Jan 2023 13:36  Patient On (Oxygen Delivery Method): ventilator,30    Mode: AC/ CMV (Assist Control/ Continuous Mandatory Ventilation), RR (machine): 20, TV (machine): 400, FiO2: 30, PEEP: 5, ITime: 1, MAP: 10, PIP: 15    Physical Exam:  General: vented sedated  Neck: supple, trachea midline  Lungs: decreased no wheeze/rhonchi Left chest tubes x 2  Cardiovascular: regular rate and rhythm, S1 S2  Abdomen: soft, nontender,  bowel sounds normal  Neurological: sedated  Skin: no rash  Extremities: +edema        LABS:                        10.5   14.54 )-----------( 180      ( 05 Jan 2023 06:17 )             30.8   01-05    134<L>  |  99  |  120<H>  ----------------------------<  223<H>  3.7   |  20<L>  |  5.49<H>    Ca    7.9<L>      05 Jan 2023 06:17  Phos  7.3     01-05  Mg     2.4     01-05    TPro  5.6<L>  /  Alb  2.1<L>  /  TBili  0.8  /  DBili  x   /  AST  44<H>  /  ALT  121<H>  /  AlkPhos  197<H>  01-04      MICROBIOLOGY:  Culture - Sputum . (12.24.22 @ 17:43)    Gram Stain:   Numerous polymorphonuclear leukocytes per low power field  No Squamous epithelial cells per low power field  Rare Gram Positive Rods seen per oil power field    -  Amikacin: S <=16    -  Aztreonam: S 8    -  Cefepime: S <=2    -  Ceftazidime: S 4    -  Ciprofloxacin: S <=0.25    -  Gentamicin: S 4    -  Imipenem: S <=1    -  Levofloxacin: S <=0.5    -  Meropenem: S <=1    -  Piperacillin/Tazobactam: S <=8    -  Tobramycin: S <=2    Specimen Source: ET Tube ET Tube    Culture Results:   Rare Pseudomonas aeruginosa  Normal Respiratory Cathy absent    Organism Identification: Pseudomonas aeruginosa    Organism: Pseudomonas aeruginosa    Method Type: BAY    Culture - Urine (12.23.22 @ 11:16)    -  Tobramycin: S <=2    -  Amoxicillin/Clavulanic Acid: S <=8/4    -  Ampicillin: R 16 These ampicillin results predict results for amoxicillin    -  Ampicillin/Sulbactam: S <=4/2 Enterobacter, Klebsiella aerogenes, Citrobacter, and Serratia may develop resistance during prolonged therapy (3-4 days)    -  Amikacin: S <=16    -  Cefazolin: S <=2    -  Cefoxitin: S <=8    -  Aztreonam: S <=4    -  Cefepime: S <=2    -  Imipenem: S <=1    -  Levofloxacin: S <=0.5    -  Ceftriaxone: S <=1 Enterobacter, Klebsiella aerogenes, Citrobacter, and Serratia may develop resistance during prolonged therapy    -  Ciprofloxacin: S <=0.25    -  Trimethoprim/Sulfamethoxazole: S <=0.5/9.5    -  Meropenem: S <=1    -  Nitrofurantoin: S <=32 Should not be used to treat pyelonephritis    -  Ertapenem: S <=0.5    -  Gentamicin: S <=2    -  Piperacillin/Tazobactam: S <=8    Specimen Source: Clean Catch Clean Catch (Midstream)    Culture Results:   >100,000 CFU/ml Klebsiella oxytoca/Raoultella ornithinolytica    Organism Identification: Klebsiella oxytoca /Raoutella ornithinolytica    Organism: Klebsiella oxytoca /Raoutella ornithinolytica    Method Type: BAY      Culture - Blood (12.23.22 @ 14:06)    Specimen Source: .Blood Blood-Peripheral    Culture Results:   No Growth Final      RADIOLOGY & ADDITIONAL STUDIES:    ACC: [id number] EXAM:  CT CHEST                          PROCEDURE DATE:  12/29/2022          INTERPRETATION:  HISTORY: Admitting Dxs: J96.01 RESP DISTRESS    EXAMINATION: CT CHEST was performed without IV contrast.    COMPARISON: 12/3/2022.    FINDINGS:    AIRWAYS, LUNGS, PLEURA: Satisfactory positioning of endotracheal tube.   Mild central airways secretions. Unchanged small loculated left   pneumothorax. Small chronic loculated right pleural effusion unchanged.   Right basilar and right middlelobe atelectasis. Emphysema.    Left chest tube in place with distal tip along the medial and upper   pleural space.    MEDIASTINUM: Cardiomegaly. No pericardial effusion. Thoracic aorta normal   caliber.  No large mediastinal lymph nodes. ICD lead terminates in the   right ventricle.    IMAGED ABDOMEN: Enteric catheter terminates in the stomach.   Cholelithiasis. Decreased abdominal extraluminal gas.    SOFT TISSUES: Decreased subcutaneous soft tissue emphysema.    BONES: Unremarkable.      IMPRESSION:.    Unchanged small loculated left pneumothorax.    Unchanged small loculated and chronic right pleural effusion.    Decreased subcutaneous soft tissue emphysema and extraluminal abdominal   gas.      ACC: [id number] EXAM:  XR CHEST PORTABLE IMMED 1V                        ACC: [id number] EXAM:  XR CHEST PORTABLE URGENT 1V                        ACC: [id number] EXAM:  XR CHEST PORTABLE URGENT 1V                        ACC: [id number] EXAM:  XR CHEST PORTABLE IMMED 1V                          PROCEDURE DATE:  01/03/2023          INTERPRETATION:  TIME OF EXAM: January 2, 2023 at 11:46 PM and 11:47 PM.    CLINICAL INFORMATION: Status post cardiac arrest. Respiratory distress   and abnormal chest sounds.    COMPARISON:  CT scan of the chest from December 29, 2022.    TECHNIQUE:   AP Portable chest x-ray.    INTERPRETATION:    The cardiac silhouette is enlarged. There is a left chest wall ICD with   intact lead with tip in expected region of the right ventricle. The   generator obscures part of the left lung.  The mediastinum is not accurately evaluated on these images.  ET tube tip is approximately 7.4 cm above the jennie.  Enteric tube tip is near the GE junction with side port in the distal   esophagus.  Left chest tube not significantly changed in position.  No significant change in small loculated right pleural effusion and right   mid and lower lung opacities. Question small pneumothorax associated with   the pleural effusion versus interposed aerated lung. Question tiny right   apical pneumothorax versus apical bulla.  Large left pneumothorax, increased in size. Concern for tension as there   is inversion of the left hemidiaphragm and widening of the rib   interspaces on the left compared to the right. Atelectasis of underlying   lung.  No acute bony abnormality.    AP portable chest x-ray from January 3, 2023 at 12:05 AM and 12:06 AM:    CLINICAL INFORMATION: Pneumothorax.    INTERPRETATION:    ET tube tip is 5.7 cm above the jennie. Enteric tube and left chest tube   unchanged in position.  Large left pneumothorax with concerns for tension, not significantly   changed.  Right-sided findings not significantly changed.    AP portable chest x-ray from January 3, 2023 at 12:53 AM and 12:54 AM:    CLINICAL INFORMATION: Replaced left chest tube.    INTERPRETATION:    ET tube tip is 6.2 cm above the jennie.  Enteric tube tip near GE junction with side port in the distal esophagus.  Left chest tube with tip projecting over the aortic knob.  Large left pneumothorax with concerns for tension, not significantly   changed. Continued atelectasis of underlying lung.  New left subcutaneous emphysema.  Small loculated right pleural effusion with likely associated passive   atelectasis is not significantly changed. Once again a small pneumothorax   component is not excluded. In addition, continued question of minimal   right apical pneumothorax versus apical bulla.    AP portable chest x-ray from January 3, 2023 at 1:35 AM:    CLINICAL INFORMATION: Chest tube.    INTERPRETATION:    ET tube tip approximately 4 cm above the jennie.  Enteric tube tip is likely in the stomach with the side port near the GE   junction.  Left chest wall ICD again seen.  Small loculated right pleural effusion with right mid and lower lung   opacities, not significantly changed. Previous concern for small   associated pneumothorax not seen. Question tiny right apical pneumothorax   versus apical bulla, unchanged.  Left chest tube unchanged in position. Left pneumothorax has resolved.   Left subcutaneous emphysema is slightly decreased. No left pleural   effusion.        IMPRESSION:  Left chest tube unchanged in position on the most recent   image with resolution of left pneumothorax. Slight decrease in left   subcutaneous emphysema.    Enteric tube tip likely in the stomach with side port near the GE   junction. Consider advancing the enteric tube. Discussed with Dr. Dobbins   with read back at 9:03 AM on January 3, 2023 by Dr. Sarmiento.    Small loculated right pleural effusion with right mid and lower lung   opacities, possibly atelectasis, although infection not excluded, not   significantly changed.    Continued question tiny right apical pneumothorax versus apical bulla.      Assessment :  Pt is a 62M CAD, Dilated cardiomyopathy, S/p AICD, Afib/flutter, h/o substance abuse, CKD baseline creat approx 1.4 last admitted to [facility] with MSSA bacteremia, and CHF.    Sent from [facility] with acute hypoxic respiratory failure. Was initially on BiPAP then became hypoxic.  Anesthesia intubated patient.  On post intubation Xray pneumothorax evident.  Patient with PEA arrest.  Chest tube placed by ED physician.  S/p PEA arrest  AHRF requiring intubation  Aspiration PNA   Loculated pleural effusions  - imaging reviewed  - BCx negative   - Sputum Cx -- Pseudomonas  - UCx -- Klebsiella  - WBC stable, afebrile  - s/p zosyn x10d course completed 1/1/2023  - pneumothorax  - WBC trending and hypothermic- restarted on Antibiotic- Meropenam as there was a concern for recurrent pneumonia    Plan:  - cont Meropenam x 5 days  - trend temps/WBC--stable  - maintain aspiration precautions  - chest tube per pulm critical care  - vent management per ICU care  - GOC per primary team      Continue with present regiment.  Appropriate use of antibiotics and adverse effects reviewed.      > 35 minutes were spent in direct patient care reviewing notes, medications ,labs data/ imaging , discussion with multidisciplinary team.    Thank you for allowing me to participate in care of your patient .    Kelley Phan MD  Infectious Disease  [phone]

## 2023-01-05 NOTE — PROGRESS NOTE ADULT - SUBJECTIVE AND OBJECTIVE BOX
Subjective: no change in status. Vented, FiO2 30% Still on Dobutamine. UO 1000cc over 24h. Cr is down.       MEDICATIONS  (STANDING):  albuterol    90 MICROgram(s) HFA Inhaler 2 Puff(s) Inhalation every 6 hours  albuterol/ipratropium for Nebulization. 3 milliLiter(s) Nebulizer once  aMIOdarone    Tablet 200 milliGRAM(s) Oral daily  aspirin  chewable 81 milliGRAM(s) Oral daily  chlorhexidine 0.12% Liquid 15 milliLiter(s) Oral Mucosa every 12 hours  dexMEDEtomidine Infusion 0.2 MICROgram(s)/kG/Hr (4.08 mL/Hr) IV Continuous <Continuous>  dextrose 5%. 1000 milliLiter(s) (100 mL/Hr) IV Continuous <Continuous>  dextrose 50% Injectable 25 Gram(s) IV Push once  dextrose 50% Injectable 12.5 Gram(s) IV Push once  dextrose 50% Injectable 25 Gram(s) IV Push once  DOBUTamine Infusion 5 MICROgram(s)/kG/Min (12.2 mL/Hr) IV Continuous <Continuous>  glucagon  Injectable 1 milliGRAM(s) IntraMuscular once  insulin lispro (ADMELOG) corrective regimen sliding scale   SubCutaneous every 6 hours  ipratropium 17 MICROgram(s) HFA Inhaler 1 Puff(s) Inhalation every 6 hours  meropenem  IVPB 500 milliGRAM(s) IV Intermittent every 12 hours  pantoprazole  Injectable 40 milliGRAM(s) IV Push daily    MEDICATIONS  (PRN):  dextrose Oral Gel 15 Gram(s) Oral once PRN Blood Glucose LESS THAN 70 milliGRAM(s)/deciliter  fentaNYL    Injectable 25 MICROGram(s) IV Push every 2 hours PRN pain or vent synchrony  midazolam Injectable 2 milliGRAM(s) IV Push every 2 hours PRN Agitation          T(C): 36.7 (01-05-23 @ 12:32), Max: 37 (01-05-23 @ 04:06)  HR: 86 (01-05-23 @ 15:50) (71 - 93)  BP: 140/97 (01-05-23 @ 15:30) (127/77 - 146/124)  RR: 19 (01-05-23 @ 15:30) (13 - 30)  SpO2: 97% (01-05-23 @ 15:50) (95% - 100%)  Wt(kg): --    ABG - ( 04 Jan 2023 06:33 )  pH, Arterial: 7.33  pH, Blood: x     /  pCO2: 32    /  pO2: 132   / HCO3: 17    / Base Excess: -9.0  /  SaO2: 99.8                I&O's Detail    04 Jan 2023 07:01  -  05 Jan 2023 07:00  --------------------------------------------------------  IN:    Dexmedetomidine: 205 mL    DOBUTamine: 305 mL    Enteral Tube Flush: 500 mL    IV PiggyBack: 100 mL    Nepro with Carb Steady: 710 mL  Total IN: 1820 mL    OUT:    Chest Tube (mL): 0 mL    Chest Tube (mL): 70 mL    Indwelling Catheter - Urethral (mL): 1000 mL  Total OUT: 1070 mL    Total NET: 750 mL      05 Jan 2023 07:01  -  05 Jan 2023 15:54  --------------------------------------------------------  IN:    Dexmedetomidine: 104.4 mL    DOBUTamine: 146.4 mL  Total IN: 250.8 mL    OUT:  Total OUT: 0 mL    Total NET: 250.8 mL          Mode: AC/ CMV (Assist Control/ Continuous Mandatory Ventilation)  RR (machine): 20  TV (machine): 400  FiO2: 30  PEEP: 5  ITime: 1  MAP: 14  PIP: 30       PHYSICAL EXAM:      GENERAL: intubated.   CHEST/LUNG: coarse BS, dec BS on L, CTs  HEART: S1S2  ABDOMEN: Soft, distended,   EXTREMITIES:  trace edema        LABS:  CBC Full  -  ( 05 Jan 2023 06:17 )  WBC Count : 14.54 K/uL  RBC Count : 3.43 M/uL  Hemoglobin : 10.5 g/dL  Hematocrit : 30.8 %  Platelet Count - Automated : 180 K/uL  Mean Cell Volume : 89.8 fl  Mean Cell Hemoglobin : 30.6 pg  Mean Cell Hemoglobin Concentration : 34.1 gm/dL  Auto Neutrophil # : x  Auto Lymphocyte # : x  Auto Monocyte # : x  Auto Eosinophil # : x  Auto Basophil # : x  Auto Neutrophil % : x  Auto Lymphocyte % : x  Auto Monocyte % : x  Auto Eosinophil % : x  Auto Basophil % : x    01-05    134<L>  |  99  |  120<H>  ----------------------------<  223<H>  3.7   |  20<L>  |  5.49<H>    Ca    7.9<L>      05 Jan 2023 06:17  Phos  7.3     01-05  Mg     2.4     01-05    TPro  5.6<L>  /  Alb  2.1<L>  /  TBili  0.8  /  DBili  x   /  AST  44<H>  /  ALT  121<H>  /  AlkPhos  197<H>  01-04    PT/INR - ( 04 Jan 2023 06:44 )   PT: 12.5 sec;   INR: 1.06 ratio         PTT - ( 03 Jan 2023 18:54 )  PTT:32.2 sec      ASSESSMENT:  1.  DERRICK -- ischemic, septic ATN. Peak Cr 6.35. Improving renal indices, UO.   2.  Acute respiratory failure and PEA arrest  3.  Large left sided tension pneumothorax s/p chest tube x 2  4.  Hypernatremia -> hyponatremia, hypervolemic  5.  Trending hypotension - probably cardiogenic in nature given hx of pre existing severe systolic dysfunction and dilated CM    PLAN:  Cont to trend Cr, UO  No immediate dialytic indications  Reduce enteral water to Q12h 100 cc and follow repeat serum Na in 24h

## 2023-01-05 NOTE — PROGRESS NOTE ADULT - SUBJECTIVE AND OBJECTIVE BOX
Chief Complaint: Respiratory failure    Interval Events: No events overnight.    Review of Systems:  Unable to obtain    Physical Exam:  Vital Signs Last 24 Hrs  T(C): 36.7 (05 Jan 2023 08:28), Max: 37 (05 Jan 2023 04:06)  T(F): 98 (05 Jan 2023 08:28), Max: 98.6 (05 Jan 2023 04:06)  HR: 80 (05 Jan 2023 10:00) (71 - 93)  BP: 138/84 (05 Jan 2023 10:00) (122/74 - 146/124)  BP(mean): 100 (05 Jan 2023 10:00) (89 - 133)  RR: 25 (05 Jan 2023 10:00) (11 - 30)  SpO2: 98% (05 Jan 2023 10:00) (95% - 100%)  Parameters below as of 05 Jan 2023 08:01  Patient On (Oxygen Delivery Method): ventilator,30  General: Sedated  HEENT: Intubated  Neck: No JVD, no carotid bruit  Lungs: CTAB  CV: RRR, nl S1/S2, no M/R/G  Abdomen: S/NT/ND, +BS  Extremities: No LE edema, no cyanosis  Neuro: AAOx0  Skin: No rash    Labs:    01-05    134<L>  |  99  |  120<H>  ----------------------------<  223<H>  3.7   |  20<L>  |  5.49<H>    Ca    7.9<L>      05 Jan 2023 06:17  Phos  7.3     01-05  Mg     2.4     01-05    TPro  5.6<L>  /  Alb  2.1<L>  /  TBili  0.8  /  DBili  x   /  AST  44<H>  /  ALT  121<H>  /  AlkPhos  197<H>  01-04                          10.5   14.54 )-----------( 180      ( 05 Jan 2023 06:17 )             30.8     ECG/Telemetry: Sinus rhythm

## 2023-01-05 NOTE — PROGRESS NOTE ADULT - SUBJECTIVE AND OBJECTIVE BOX
DOROTHY VOSS    [facility]    Allergies    No Known Allergies    Intolerances    pork (Other)      PAST MEDICAL & SURGICAL HISTORY:  Heart failure, systolic      CAD (coronary artery disease)      Hypertension      Nonischemic cardiomyopathy      COPD, moderate      2019 novel coronavirus disease (COVID-19)      Substance abuse      HLD (hyperlipidemia)      Cardiac LV ejection fraction 10-20%      AICD (automatic cardioverter/defibrillator) present      Cardiac LV ejection fraction 10-20%          FAMILY HISTORY:  FH: lung cancer        Home Medications:  acetaminophen 325 mg oral tablet: 2 tab(s) orally every 6 hours, As needed, Temp greater or equal to 38C (100.4F), Mild Pain (1 - 3) (23 Dec 2022 10:07)  apixaban 5 mg oral tablet: 1 tab(s) orally every 12 hours (23 Dec 2022 10:07)  Aquaphor Healing topical ointment: Apply topically to affected area once a day (23 Dec 2022 10:07)  ascorbic acid 500 mg oral tablet: 1 tab(s) orally once a day (23 Dec 2022 10:07)  Bacid (LAC) oral capsule: 2 cap(s) orally once a day (23 Dec 2022 10:07)  bumetanide 2 mg oral tablet: 1 tab(s) orally 2 times a day (23 Dec 2022 10:07)  gabapentin 100 mg oral capsule: 1 cap(s) orally 3 times a day (23 Dec 2022 10:07)  melatonin 3 mg oral tablet: 1 tab(s) orally once a day (at bedtime), As needed, Insomnia (23 Dec 2022 10:07)  Multiple Vitamins with Minerals oral tablet: 1 tab(s) orally once a day (23 Dec 2022 10:07)  pantoprazole 40 mg oral delayed release tablet: 1 tab(s) orally once a day (before a meal) (23 Dec 2022 10:07)  sacubitril-valsartan 24 mg-26 mg oral tablet: 1 tab(s) orally 2 times a day (23 Dec 2022 10:07)  simethicone 80 mg oral tablet: 2 tab(s) orally every 6 hours, As Needed (23 Dec 2022 10:07)  spironolactone 25 mg oral tablet: 1 tab(s) orally once a day (23 Dec 2022 10:07)  Symbicort 160 mcg-4.5 mcg/inh inhalation aerosol: 2 puff(s) inhaled 2 times a day (23 Dec 2022 10:07)  Ventolin HFA 90 mcg/inh inhalation aerosol: 2 puff(s) inhaled every 6 hours, As Needed (23 Dec 2022 10:07)      MEDICATIONS  (STANDING):  albuterol    90 MICROgram(s) HFA Inhaler 2 Puff(s) Inhalation every 6 hours  albuterol/ipratropium for Nebulization. 3 milliLiter(s) Nebulizer once  aMIOdarone    Tablet 200 milliGRAM(s) Oral daily  aspirin  chewable 81 milliGRAM(s) Oral daily  chlorhexidine 0.12% Liquid 15 milliLiter(s) Oral Mucosa every 12 hours  dexMEDEtomidine Infusion 0.2 MICROgram(s)/kG/Hr (4.08 mL/Hr) IV Continuous <Continuous>  dextrose 5%. 1000 milliLiter(s) (100 mL/Hr) IV Continuous <Continuous>  dextrose 50% Injectable 25 Gram(s) IV Push once  dextrose 50% Injectable 12.5 Gram(s) IV Push once  dextrose 50% Injectable 25 Gram(s) IV Push once  DOBUTamine Infusion 5 MICROgram(s)/kG/Min (12.2 mL/Hr) IV Continuous <Continuous>  glucagon  Injectable 1 milliGRAM(s) IntraMuscular once  insulin lispro (ADMELOG) corrective regimen sliding scale   SubCutaneous every 6 hours  ipratropium 17 MICROgram(s) HFA Inhaler 1 Puff(s) Inhalation every 6 hours  lactulose Syrup 20 Gram(s) Oral two times a day  meropenem  IVPB 500 milliGRAM(s) IV Intermittent every 12 hours  pantoprazole  Injectable 40 milliGRAM(s) IV Push daily    MEDICATIONS  (PRN):  dextrose Oral Gel 15 Gram(s) Oral once PRN Blood Glucose LESS THAN 70 milliGRAM(s)/deciliter  fentaNYL    Injectable 25 MICROGram(s) IV Push every 2 hours PRN pain or vent synchrony  midazolam Injectable 2 milliGRAM(s) IV Push every 2 hours PRN Agitation      Diet, NPO with Tube Feed:   Tube Feeding Modality: Nasogastric  Nepro with Carb Steady  Total Volume for 24 Hours (mL): 960  Continuous  Starting Tube Feed Rate mL per Hour: 20  Increase Tube Feed Rate by (mL): 10     Every 4 hours  Until Goal Tube Feed Rate (mL per Hour): 40  Tube Feed Duration (in Hours): 24  Tube Feed Start Time: 13:00  Free Water Flush  Pump   Rate (mL per Hour): 30 (12-26-22 @ 23:12) [Active]          Vital Signs Last 24 Hrs  T(C): 36.7 (05 Jan 2023 08:28), Max: 37 (05 Jan 2023 04:06)  T(F): 98 (05 Jan 2023 08:28), Max: 98.6 (05 Jan 2023 04:06)  HR: 89 (05 Jan 2023 08:08) (67 - 93)  BP: 144/85 (05 Jan 2023 08:00) (122/74 - 146/124)  BP(mean): 103 (05 Jan 2023 08:00) (89 - 133)  RR: 14 (05 Jan 2023 08:00) (11 - 30)  SpO2: 98% (05 Jan 2023 08:08) (95% - 100%)    Parameters below as of 05 Jan 2023 08:01  Patient On (Oxygen Delivery Method): ventilator,30          01-04-23 @ 07:01  -  01-05-23 @ 07:00  --------------------------------------------------------  IN: 1820 mL / OUT: 1070 mL / NET: 750 mL        Mode: AC/ CMV (Assist Control/ Continuous Mandatory Ventilation), RR (machine): 20, TV (machine): 400, FiO2: 30, PEEP: 5, ITime: 1, MAP: 14, PIP: 22      LABS:                        10.5   14.54 )-----------( 180      ( 05 Jan 2023 06:17 )             30.8     01-05    134<L>  |  99  |  120<H>  ----------------------------<  223<H>  3.7   |  20<L>  |  5.49<H>    Ca    7.9<L>      05 Jan 2023 06:17  Phos  7.3     01-05  Mg     2.4     01-05    TPro  5.6<L>  /  Alb  2.1<L>  /  TBili  0.8  /  DBili  x   /  AST  44<H>  /  ALT  121<H>  /  AlkPhos  197<H>  01-04    PT/INR - ( 04 Jan 2023 06:44 )   PT: 12.5 sec;   INR: 1.06 ratio         PTT - ( 03 Jan 2023 18:54 )  PTT:32.2 sec      ABG - ( 04 Jan 2023 06:33 )  pH, Arterial: 7.33  pH, Blood: x     /  pCO2: 32    /  pO2: 132   / HCO3: 17    / Base Excess: -9.0  /  SaO2: 99.8                WBC:  WBC Count: 14.54 K/uL (01-05 @ 06:17)  WBC Count: 14.94 K/uL (01-04 @ 06:43)  WBC Count: 13.61 K/uL (01-03 @ 18:54)  WBC Count: 12.21 K/uL (01-03 @ 06:00)  WBC Count: 11.89 K/uL (01-02 @ 17:00)      MICROBIOLOGY:  RECENT CULTURES:              PT/INR - ( 04 Jan 2023 06:44 )   PT: 12.5 sec;   INR: 1.06 ratio         PTT - ( 03 Jan 2023 18:54 )  PTT:32.2 sec    Sodium:  Sodium, Serum: 134 mmol/L (01-05 @ 06:17)  Sodium, Serum: 133 mmol/L (01-04 @ 06:44)  Sodium, Serum: 132 mmol/L (01-03 @ 06:00)  Sodium, Serum: 130 mmol/L (01-02 @ 17:00)      5.49 mg/dL 01-05 @ 06:17  6.35 mg/dL 01-04 @ 06:44  5.75 mg/dL 01-03 @ 06:00  5.10 mg/dL 01-02 @ 17:00      Hemoglobin:  Hemoglobin: 10.5 g/dL (01-05 @ 06:17)  Hemoglobin: 11.0 g/dL (01-04 @ 06:43)  Hemoglobin: 11.6 g/dL (01-03 @ 18:54)  Hemoglobin: 11.2 g/dL (01-03 @ 06:00)  Hemoglobin: 13.9 g/dL (01-02 @ 17:00)      Platelets: Platelet Count - Automated: 180 K/uL (01-05 @ 06:17)  Platelet Count - Automated: 151 K/uL (01-04 @ 06:43)  Platelet Count - Automated: 157 K/uL (01-03 @ 18:54)  Platelet Count - Automated: 141 K/uL (01-03 @ 06:00)  Platelet Count - Automated: 152 K/uL (01-02 @ 17:00)      LIVER FUNCTIONS - ( 04 Jan 2023 06:44 )  Alb: 2.1 g/dL / Pro: 5.6 g/dL / ALK PHOS: 197 U/L / ALT: 121 U/L DA / AST: 44 U/L / GGT: x                 RADIOLOGY & ADDITIONAL STUDIES:      MICROBIOLOGY:  RECENT CULTURES:

## 2023-01-05 NOTE — PROGRESS NOTE ADULT - SUBJECTIVE AND OBJECTIVE BOX
INTERVAL HPI/OVERNIGHT EVENTS:  No new overnight event.  No N/V/D.  Tolerating diet via ngt    Allergies    No Known Allergies    Intolerances    pork (Other)    General:  No wt loss, fevers, chills, night sweats, fatigue,   Eyes:  Good vision, no reported pain  ENT:  No sore throat, pain, runny nose, dysphagia  CV:  No pain, palpitations, hypo/hypertension  Resp:  No dyspnea, cough, tachypnea, wheezing  GI:  No pain, No nausea, No vomiting, No diarrhea, No constipation, No weight loss, No fever, No pruritis, No rectal bleeding, No tarry stools, No dysphagia,  :  No pain, bleeding, incontinence, nocturia  Muscle:  No pain, weakness  Neuro:  No weakness, tingling, memory problems  Psych:  No fatigue, insomnia, mood problems, depression  Endocrine:  No polyuria, polydipsia, cold/heat intolerance  Heme:  No petechiae, ecchymosis, easy bruisability  Skin:  No rash, tattoos, scars, edema      PHYSICAL EXAM:   Vital Signs:  Vital Signs Last 24 Hrs  T(C): 36.7 (2023 08:28), Max: 37 (2023 04:06)  T(F): 98 (2023 08:28), Max: 98.6 (2023 04:06)  HR: 90 (2023 09:00) (68 - 93)  BP: 142/92 (2023 09:00) (122/74 - 146/124)  BP(mean): 106 (2023 09:00) (89 - 133)  RR: 18 (2023 09:00) (11 - 30)  SpO2: 96% (2023 09:00) (95% - 100%)    Parameters below as of 2023 08:01  Patient On (Oxygen Delivery Method): ventilator,30      Daily     Daily Weight in k.4 (2023 04:06)I&O's Summary    2023 07:01  -  2023 07:00  --------------------------------------------------------  IN: 1820 mL / OUT: 1070 mL / NET: 750 mL        GENERAL:  Appears stated age, well-groomed, well-nourished, no distress  HEENT:  NC/AT,  conjunctivae clear and pink, no thyromegaly, nodules, adenopathy, no JVD, sclera -anicteric  CHEST:  Full & symmetric excursion, no increased effort, breath sounds clear  HEART:  Regular rhythm, S1, S2, no murmur/rub/S3/S4, no abdominal bruit, no edema  ABDOMEN:  Soft, non-tender, non-distended, normoactive bowel sounds,  no masses ,no hepato-splenomegaly, no signs of chronic liver disease  EXTEREMITIES:  no cyanosis,clubbing or edema  SKIN:  No rash/erythema/ecchymoses/petechiae/wounds/abscess/warm/dry  NEURO:  Alert, oriented, no asterixis, no tremor, no encephalopathy      LABS:                        10.5   14.54 )-----------( 180      ( 2023 06:17 )             30.8     01-05    134<L>  |  99  |  120<H>  ----------------------------<  223<H>  3.7   |  20<L>  |  5.49<H>    Ca    7.9<L>      2023 06:17  Phos  7.3     -  Mg     2.4     -    TPro  5.6<L>  /  Alb  2.1<L>  /  TBili  0.8  /  DBili  x   /  AST  44<H>  /  ALT  121<H>  /  AlkPhos  197<H>  -    PT/INR - ( 2023 06:44 )   PT: 12.5 sec;   INR: 1.06 ratio         PTT - ( 2023 18:54 )  PTT:32.2 sec    amylase   lipase  RADIOLOGY & ADDITIONAL TESTS:

## 2023-01-05 NOTE — PROGRESS NOTE ADULT - SUBJECTIVE AND OBJECTIVE BOX
Patient is a 62y old  Male who presents with a chief complaint of resp failure (26 Dec 2022 09:47)    HPI: 62M CAD, Dilated cardiomyopathy, S/p AICD, Afib/flutter, h/o substance abuse, CKD baseline creat approx 1.4 last admitted to [facility] with MSSA bacteremia, and CHF.  Sent from [facility] SNF with acute hypoxic respiratory failure.  Was initially on BiPAP then became hypoxic.  Anesthesia intubated patient.  On post intubation Xray pneumothorax evident.  Patient with PEA arrest.  Chest tube placed by ED physician.  ROSC obtained.     Patient unable to give further history.      Intetrval history -     Intubated on vent.   Sedated - had breathing issues    MEDICATIONS  (STANDING):    albuterol    90 MICROgram(s) HFA Inhaler 2 Puff(s) Inhalation every 6 hours  albuterol/ipratropium for Nebulization. 3 milliLiter(s) Nebulizer once  aMIOdarone    Tablet 200 milliGRAM(s) Oral daily  aspirin  chewable 81 milliGRAM(s) Oral daily  chlorhexidine 0.12% Liquid 15 milliLiter(s) Oral Mucosa every 12 hours  dexMEDEtomidine Infusion 0.2 MICROgram(s)/kG/Hr (4.08 mL/Hr) IV Continuous <Continuous>  dextrose 5%. 1000 milliLiter(s) (100 mL/Hr) IV Continuous <Continuous>  dextrose 50% Injectable 25 Gram(s) IV Push once  dextrose 50% Injectable 12.5 Gram(s) IV Push once  dextrose 50% Injectable 25 Gram(s) IV Push once  DOBUTamine Infusion 5 MICROgram(s)/kG/Min (12.2 mL/Hr) IV Continuous <Continuous>  glucagon  Injectable 1 milliGRAM(s) IntraMuscular once  insulin lispro (ADMELOG) corrective regimen sliding scale   SubCutaneous every 6 hours  ipratropium 17 MICROgram(s) HFA Inhaler 1 Puff(s) Inhalation every 6 hours  meropenem  IVPB 500 milliGRAM(s) IV Intermittent every 12 hours  pantoprazole  Injectable 40 milliGRAM(s) IV Push daily    MEDICATIONS  (PRN):    dextrose Oral Gel 15 Gram(s) Oral once PRN Blood Glucose LESS THAN 70 milliGRAM(s)/deciliter  fentaNYL    Injectable 25 MICROGram(s) IV Push every 2 hours PRN pain or vent synchrony  midazolam Injectable 2 milliGRAM(s) IV Push every 2 hours PRN Agitation    Allergies    No Known Allergies    Intolerances    pork (Other)    REVIEW OF SYSTEMS: UTO    PHYSICAL EXAM:    Vital Signs Last 24 Hrs    T(C): 36.7 (05 Jan 2023 16:02), Max: 37 (05 Jan 2023 04:06)  T(F): 98 (05 Jan 2023 16:02), Max: 98.6 (05 Jan 2023 04:06)  HR: 86 (05 Jan 2023 18:28) (79 - 123)  BP: 140/84 (05 Jan 2023 18:00) (132/83 - 146/94)  BP(mean): 99 (05 Jan 2023 18:00) (97 - 112)  RR: 28 (05 Jan 2023 18:00) (13 - 29)  SpO2: 96% (05 Jan 2023 18:28) (94% - 100%)    Parameters below as of 05 Jan 2023 13:36  Patient On (Oxygen Delivery Method): ventilator,30    On Neurological Examination:    Intubated on vent.  On sedation.  Pupils are 3 mm, sluggishly reacting to light.  Neck is supple.  No abn body movements.    LABS:    CBC Full  -  ( 05 Jan 2023 06:17 )  WBC Count : 14.54 K/uL  RBC Count : 3.43 M/uL  Hemoglobin : 10.5 g/dL  Hematocrit : 30.8 %  Platelet Count - Automated : 180 K/uL  Mean Cell Volume : 89.8 fl  Mean Cell Hemoglobin : 30.6 pg  Mean Cell Hemoglobin Concentration : 34.1 gm/dL    01-05    134<L>  |  99  |  120<H>  ----------------------------<  223<H>  3.7   |  20<L>  |  5.49<H>    Ca    7.9<L>      05 Jan 2023 06:17  Phos  7.3     01-05  Mg     2.4     01-05    TPro  5.6<L>  /  Alb  2.1<L>  /  TBili  0.8  /  DBili  x   /  AST  44<H>  /  ALT  121<H>  /  AlkPhos  197<H>  01-04    RADIOLOGY & ADDITIONAL STUDIES:    < from: TTE Echo Complete w/o Contrast w/ Doppler (02.13.22 @ 09:00) >    1. Severe left ventricular systolic dysfunction. EF 10 %  2. Dilated left ventricle, left atrium, right atrium.  3. Moderate mitral regurgitation.  4. Moderate tricuspid regurgitation.  5. Mild pulmonary hypertension.  6. No evidence of endocarditis on this transthoracic study.    < end of copied text >    r< from: CT Head No Cont (12.29.22 @ 13:20) >    IMPRESSION: No acute intracranial hemorrhage, mass effect, or shift of the midline structures.    Similar-appearing mild chronic white matter microvascular type changes.    < end of copied text >    < from: CT Head No Cont (12.23.22 @ 21:18) >    IMPRESSION:    No large territory acute infarct, intracranial hemorrhage, or mass effect.    Slight progression of chronic microangiopathic white matter changes as compared to prior study from 2016.    < end of copied text >    < from: CT Chest No Cont (12.23.22 @ 21:19) >    IMPRESSION:    *  Left chest tube with residual small left pneumothorax. No evidence of tension.  *  Right basilar rounded atelectasis again noted with chronic small loculated right pleural effusion.  *  Small pneumomediastinum. Left chest and abdominal wall emphysema. Moderate pneumoretroperitoneum and properitoneal air. No pneumoperitoneum. Findings are compatible with barotrauma.  *  Evidence of urinary bladder cystitis.    < end of copied text >

## 2023-01-05 NOTE — PROGRESS NOTE ADULT - ASSESSMENT
The patient is a 62 year old male with a history of CAD, chronic systolic heart failure s/p ICD, atrial flutter s/p DCCV, substance abuse, CKD who is admitted with respiratory failure in the setting of tension pneumothorax complicated by cardiac arrest.    Plan:  - ECG with sinus tachycardia and known LBBB  - Echo 2/22 with severely reduced LV (EF 10%) and RV function, mod MR, mod TR, mild pulm HTN  - When extubated and BP trends up, will attempt to resume metoprolol succinate. Not a candidate for ACEI/ARB/ARNI at the current time due to renal function.  - Poor renal function - hold apixaban  - Hold bumetanide and spironolactone due to DERRICK  - Continue amiodarone 200 mg daily  - Continue aspirin 81 mg daily  - Renal function improving now  - Continue dobutamine 5 mcg/kg/min  - Chest tubes in place  - Mechanical ventilation  - ICU care  - Overall poor prognosis

## 2023-01-05 NOTE — PROGRESS NOTE ADULT - ASSESSMENT
62M CAD, Dilated cardiomyopathy, S/p AICD, Afib/flutter, h/o substance abuse,  presented with acute hypoxic and hypercarbic respiratory failure associated with pneumothorax which lled to cardiac arrest       Acute respiratory failure secondary to tension pneumothorax with leading to cardiac arrest   - continue vent management;  weaning vs. trach .  ENT aware of poss trach.   - continue chest tubes to suction for now.   - s/p Zosyn total 10 days.   - monitor for signs of bleeding    hypernatremia  - resolved    Cardiomyopathy  - current cardiac issues appear to be secondary to pulm issues at this time  - hold eliquis pending possible further procedures and hospital course    DM2  - not on meds at SNF  - FS monitoring with lispro coverage scale while critically ill    DERRICK on CKD  - Likely ATN due to above, possible HF as well  - monitor creatinine,   - avoid nephrotoxic agents  - now oliguric    Prophylactic measure  - DVT proph: heparin SQ for now  - GI proph: protonix    Ethics consult appreciated.  SW aware to evaluate for next steps in either trach/peg/possible HD vs   comfort care.      62M CAD, Dilated cardiomyopathy, S/p AICD, Afib/flutter, h/o substance abuse,  presented with acute hypoxic and hypercarbic respiratory failure associated with pneumothorax which lled to cardiac arrest       Acute respiratory failure secondary to tension pneumothorax with leading to cardiac arrest   - continue vent management;  weaning vs. trach .  ENT aware of poss trach.   - continue chest tubes to suction for now.   - s/p Zosyn total 10 days.   - monitor for signs of bleeding    hypernatremia  - resolved    Cardiomyopathy  - current cardiac issues appear to be secondary to pulm issues at this time  - hold eliquis pending possible further procedures and hospital course  - on dobutamine per cardiolgy    DM2  - not on meds at SNF  - FS monitoring with lispro coverage scale while critically ill    DERRICK on CKD  - Likely ATN due to above, possible HF as well  - monitor creatinine,   - avoid nephrotoxic agents  - now oliguric    Prophylactic measure  - DVT proph: heparin SQ has been on hold due to bleeding from ET tube.  will restart tomorrow if no further signs of bleeding  - GI proph: protonix    Ethics consult appreciated.  SW aware to evaluate for next steps in either trach/peg/possible HD vs   comfort care.

## 2023-01-05 NOTE — PROGRESS NOTE ADULT - SUBJECTIVE AND OBJECTIVE BOX
Date/Time Patient Seen:  		  Referring MD:   Data Reviewed	       Patient is a 62y old  Male who presents with a chief complaint of Respiratory failure (04 Jan 2023 20:01)      Subjective/HPI     PAST MEDICAL & SURGICAL HISTORY:  Heart failure, systolic    CAD (coronary artery disease)    Hypertension    Nonischemic cardiomyopathy    COPD, moderate    2019 novel coronavirus disease (COVID-19)    Substance abuse    HLD (hyperlipidemia)    Cardiac LV ejection fraction 10-20%    No significant past surgical history    AICD (automatic cardioverter/defibrillator) present    Cardiac LV ejection fraction 10-20%          Medication list         MEDICATIONS  (STANDING):  albuterol    90 MICROgram(s) HFA Inhaler 2 Puff(s) Inhalation every 6 hours  albuterol/ipratropium for Nebulization. 3 milliLiter(s) Nebulizer once  aMIOdarone    Tablet 200 milliGRAM(s) Oral daily  aspirin  chewable 81 milliGRAM(s) Oral daily  chlorhexidine 0.12% Liquid 15 milliLiter(s) Oral Mucosa every 12 hours  dexMEDEtomidine Infusion 0.2 MICROgram(s)/kG/Hr (4.08 mL/Hr) IV Continuous <Continuous>  dextrose 5%. 1000 milliLiter(s) (100 mL/Hr) IV Continuous <Continuous>  dextrose 50% Injectable 25 Gram(s) IV Push once  dextrose 50% Injectable 12.5 Gram(s) IV Push once  dextrose 50% Injectable 25 Gram(s) IV Push once  DOBUTamine Infusion 5 MICROgram(s)/kG/Min (12.2 mL/Hr) IV Continuous <Continuous>  glucagon  Injectable 1 milliGRAM(s) IntraMuscular once  insulin lispro (ADMELOG) corrective regimen sliding scale   SubCutaneous every 6 hours  ipratropium 17 MICROgram(s) HFA Inhaler 1 Puff(s) Inhalation every 6 hours  lactulose Syrup 20 Gram(s) Oral two times a day  meropenem  IVPB 500 milliGRAM(s) IV Intermittent every 12 hours  pantoprazole  Injectable 40 milliGRAM(s) IV Push daily    MEDICATIONS  (PRN):  dextrose Oral Gel 15 Gram(s) Oral once PRN Blood Glucose LESS THAN 70 milliGRAM(s)/deciliter  fentaNYL    Injectable 25 MICROGram(s) IV Push every 2 hours PRN pain or vent synchrony  midazolam Injectable 2 milliGRAM(s) IV Push every 2 hours PRN Agitation         Vitals log        ICU Vital Signs Last 24 Hrs  T(C): 37 (05 Jan 2023 04:06), Max: 37 (05 Jan 2023 04:06)  T(F): 98.6 (05 Jan 2023 04:06), Max: 98.6 (05 Jan 2023 04:06)  HR: 80 (05 Jan 2023 06:00) (67 - 93)  BP: 132/86 (05 Jan 2023 06:00) (122/74 - 146/124)  BP(mean): 100 (05 Jan 2023 06:00) (89 - 133)  ABP: --  ABP(mean): --  RR: 20 (05 Jan 2023 06:00) (11 - 30)  SpO2: 97% (05 Jan 2023 06:00) (95% - 100%)    O2 Parameters below as of 05 Jan 2023 06:00  Patient On (Oxygen Delivery Method): ventilator    O2 Concentration (%): 30         Mode: AC/ CMV (Assist Control/ Continuous Mandatory Ventilation)  RR (machine): 20  TV (machine): 400  FiO2: 30  PEEP: 5  ITime: 1  MAP: 14  PIP: 28      Input and Output:  I&O's Detail    03 Jan 2023 07:01  -  04 Jan 2023 07:00  --------------------------------------------------------  IN:    Dexmedetomidine: 140.4 mL    DOBUTamine: 195.2 mL    IV PiggyBack: 150 mL    sodium chloride 0.9%: 150 mL  Total IN: 635.6 mL    OUT:    Chest Tube (mL): 5 mL    Chest Tube (mL): 25 mL    Indwelling Catheter - Urethral (mL): 250 mL    Nepro with Carb Steady: 0 mL  Total OUT: 280 mL    Total NET: 355.6 mL      04 Jan 2023 07:01  -  05 Jan 2023 06:54  --------------------------------------------------------  IN:    Dexmedetomidine: 196.8 mL    DOBUTamine: 292.8 mL    Enteral Tube Flush: 500 mL    IV PiggyBack: 100 mL    Nepro with Carb Steady: 710 mL  Total IN: 1799.6 mL    OUT:    Chest Tube (mL): 0 mL    Chest Tube (mL): 70 mL    Indwelling Catheter - Urethral (mL): 1000 mL  Total OUT: 1070 mL    Total NET: 729.6 mL          Lab Data                        10.5   14.54 )-----------( 180      ( 05 Jan 2023 06:17 )             30.8     01-04    133<L>  |  97  |  130<H>  ----------------------------<  107<H>  3.9   |  19<L>  |  6.35<H>    Ca    8.3<L>      04 Jan 2023 06:44    TPro  5.6<L>  /  Alb  2.1<L>  /  TBili  0.8  /  DBili  x   /  AST  44<H>  /  ALT  121<H>  /  AlkPhos  197<H>  01-04    ABG - ( 04 Jan 2023 06:33 )  pH, Arterial: 7.33  pH, Blood: x     /  pCO2: 32    /  pO2: 132   / HCO3: 17    / Base Excess: -9.0  /  SaO2: 99.8                    Review of Systems	      Objective     Physical Examination    heart s1s2  lung dec bS  head nc      Pertinent Lab findings & Imaging      Leo:  NO   Adequate UO     I&O's Detail    03 Jan 2023 07:01  -  04 Jan 2023 07:00  --------------------------------------------------------  IN:    Dexmedetomidine: 140.4 mL    DOBUTamine: 195.2 mL    IV PiggyBack: 150 mL    sodium chloride 0.9%: 150 mL  Total IN: 635.6 mL    OUT:    Chest Tube (mL): 5 mL    Chest Tube (mL): 25 mL    Indwelling Catheter - Urethral (mL): 250 mL    Nepro with Carb Steady: 0 mL  Total OUT: 280 mL    Total NET: 355.6 mL      04 Jan 2023 07:01  -  05 Jan 2023 06:54  --------------------------------------------------------  IN:    Dexmedetomidine: 196.8 mL    DOBUTamine: 292.8 mL    Enteral Tube Flush: 500 mL    IV PiggyBack: 100 mL    Nepro with Carb Steady: 710 mL  Total IN: 1799.6 mL    OUT:    Chest Tube (mL): 0 mL    Chest Tube (mL): 70 mL    Indwelling Catheter - Urethral (mL): 1000 mL  Total OUT: 1070 mL    Total NET: 729.6 mL               Discussed with:     Cultures:	        Radiology

## 2023-01-05 NOTE — PROGRESS NOTE ADULT - ASSESSMENT
62M CAD, Dilated cardiomyopathy, S/p AICD, Afib/flutter, h/o substance abuse, CKD baseline creat approx 1.4 last admitted to [facility] with MSSA bacteremia, and CHF.  Sent from [facility] SNF with acute hypoxic respiratory failure/PEA arrest.  Initial CT Head - No acute pathology.  Repeat CT head 12/29 - : No acute intracranial hemorrhage, mass effect, or shift of the midline structures. Similar-appearing mild chronic white matter microvascular type changes.  Renal failure  Elevated LFTs.  Severe left ventricular systolic dysfunction. EF 10 %  Pt with multi organ failure. Poor overall prognosis.  Pt is DNR appropriate.  D/w Dr. Awan and the covering nurse.  Would follow.

## 2023-01-05 NOTE — PROGRESS NOTE ADULT - SUBJECTIVE AND OBJECTIVE BOX
Patient is a 62y old  Male who presents with a chief complaint of resp failure (05 Jan 2023 09:34)    INTERVAL HPI/OVERNIGHT EVENTS: events noted.     MEDICATIONS  (STANDING):  albuterol    90 MICROgram(s) HFA Inhaler 2 Puff(s) Inhalation every 6 hours  albuterol/ipratropium for Nebulization. 3 milliLiter(s) Nebulizer once  aMIOdarone    Tablet 200 milliGRAM(s) Oral daily  aspirin  chewable 81 milliGRAM(s) Oral daily  chlorhexidine 0.12% Liquid 15 milliLiter(s) Oral Mucosa every 12 hours  dexMEDEtomidine Infusion 0.2 MICROgram(s)/kG/Hr (4.08 mL/Hr) IV Continuous <Continuous>  dextrose 5%. 1000 milliLiter(s) (100 mL/Hr) IV Continuous <Continuous>  dextrose 50% Injectable 25 Gram(s) IV Push once  dextrose 50% Injectable 12.5 Gram(s) IV Push once  dextrose 50% Injectable 25 Gram(s) IV Push once  DOBUTamine Infusion 5 MICROgram(s)/kG/Min (12.2 mL/Hr) IV Continuous <Continuous>  glucagon  Injectable 1 milliGRAM(s) IntraMuscular once  insulin lispro (ADMELOG) corrective regimen sliding scale   SubCutaneous every 6 hours  ipratropium 17 MICROgram(s) HFA Inhaler 1 Puff(s) Inhalation every 6 hours  meropenem  IVPB 500 milliGRAM(s) IV Intermittent every 12 hours  pantoprazole  Injectable 40 milliGRAM(s) IV Push daily    MEDICATIONS  (PRN):  dextrose Oral Gel 15 Gram(s) Oral once PRN Blood Glucose LESS THAN 70 milliGRAM(s)/deciliter  fentaNYL    Injectable 25 MICROGram(s) IV Push every 2 hours PRN pain or vent synchrony  midazolam Injectable 2 milliGRAM(s) IV Push every 2 hours PRN Agitation      Allergies  No Known Allergies    Intolerances  pork (Other)      REVIEW OF SYSTEMS:  unable to obtain    Vital Signs Last 24 Hrs  T(C): 36.7 (05 Jan 2023 12:32), Max: 37 (05 Jan 2023 04:06)  T(F): 98 (05 Jan 2023 12:32), Max: 98.6 (05 Jan 2023 04:06)  HR: 88 (05 Jan 2023 13:36) (71 - 93)  BP: 134/87 (05 Jan 2023 13:00) (127/77 - 146/124)  BP(mean): 102 (05 Jan 2023 13:00) (92 - 133)  RR: 29 (05 Jan 2023 13:00) (13 - 30)  SpO2: 97% (05 Jan 2023 13:36) (95% - 100%)    Parameters below as of 05 Jan 2023 13:36  Patient On (Oxygen Delivery Method): ventilator,30        PHYSICAL EXAM:  GENERAL: NAD  HEAD:  Atraumatic, Normocephalic  EYES:  conjunctiva and sclera clear  ENMT: trach in place  NECK: Supple,   NERVOUS SYSTEM:  awake at times,  moves left side of body,  does follow commands a ttimes.   CHEST/LUNG: Clear to auscultation bilaterally;   HEART: Regular rate and rhythm;   ABDOMEN: Soft, Nontender, mildly distended; Bowel sounds present  EXTREMITIES:  2+ Peripheral Pulses, diffuse edema worse on right side    LABS:                        10.5   14.54 )-----------( 180      ( 05 Jan 2023 06:17 )             30.8     05 Jan 2023 06:17    134    |  99     |  120    ----------------------------<  223    3.7     |  20     |  5.49     Ca    7.9        05 Jan 2023 06:17  Phos  7.3       05 Jan 2023 06:17  Mg     2.4       05 Jan 2023 06:17      PT/INR - ( 04 Jan 2023 06:44 )   PT: 12.5 sec;   INR: 1.06 ratio    PTT - ( 03 Jan 2023 18:54 )  PTT:32.2 sec    CAPILLARY BLOOD GLUCOSE  POCT Blood Glucose.: 149 mg/dL (05 Jan 2023 11:36)  POCT Blood Glucose.: 137 mg/dL (05 Jan 2023 06:17)  POCT Blood Glucose.: 130 mg/dL (05 Jan 2023 00:42)  POCT Blood Glucose.: 102 mg/dL (04 Jan 2023 18:00)      RADIOLOGY & ADDITIONAL TESTS:    Imaging Personally Reviewed:  [ ] YES     Consultant(s) Notes Reviewed:      Care Discussed with Consultants/Other Providers:    Advanced Directives: [ ] DNR  [ ] No feeding tube  [ ] MOLST in chart  [ ] MOLST completed today  [ ] Unknown

## 2023-01-06 NOTE — PROGRESS NOTE ADULT - SUBJECTIVE AND OBJECTIVE BOX
Patient is a 62y old  Male who presents with a chief complaint of resp failure (06 Jan 2023 13:54)      BRIEF HOSPITAL COURSE: 61 y/o M with PMHx of HTN, dilated cardiomyopathy s/p AICD, CKD, opoid/substance abuse, and a fib/a flutter who presented to ER on 12/23 with shortness of breath and altered mental status from nursing home. Initially placed on NIPPV, but ultimately required intubation in the setting of worsening hypoxemia. Intubation complicated by left-sided PTX, and subsequent PEA cardiac arrest. Chest tube placed emergently by ER physician, and ROSC was achieved. Pt admitted to [facility] for continuation of care. Hospital course further complicated by aspiration PNA, shock, DERRICK, and recurrent left-sided PTX requiring emergent pigtail placement on 1/3.        Events last 24 hours: Remains on full vent support. Sedated on propofol for vent synchrony. Remains on inotropic support w/ dobutamine. CT to suction.      PAST MEDICAL & SURGICAL HISTORY:  Heart failure, systolic      CAD (coronary artery disease)      Hypertension      Nonischemic cardiomyopathy      COPD, moderate      2019 novel coronavirus disease (COVID-19)      Substance abuse      HLD (hyperlipidemia)      Cardiac LV ejection fraction 10-20%      AICD (automatic cardioverter/defibrillator) present      Cardiac LV ejection fraction 10-20%          Review of Systems:  Unable to obtain due to clinical condition     Medications:  meropenem  IVPB 500 milliGRAM(s) IV Intermittent every 12 hours  aMIOdarone    Tablet 200 milliGRAM(s) Oral daily  DOBUTamine Infusion 5 MICROgram(s)/kG/Min IV Continuous <Continuous>  albuterol    90 MICROgram(s) HFA Inhaler 2 Puff(s) Inhalation every 6 hours  albuterol/ipratropium for Nebulization. 3 milliLiter(s) Nebulizer once  ipratropium 17 MICROgram(s) HFA Inhaler 1 Puff(s) Inhalation every 6 hours  dexMEDEtomidine Infusion 0.2 MICROgram(s)/kG/Hr IV Continuous <Continuous>  fentaNYL    Injectable 25 MICROGram(s) IV Push every 2 hours PRN  midazolam Injectable 2 milliGRAM(s) IV Push every 2 hours PRN  propofol Infusion 10 MICROgram(s)/kG/Min IV Continuous <Continuous>  aspirin  chewable 81 milliGRAM(s) Oral daily  glycopyrrolate Injectable 0.1 milliGRAM(s) IV Push once  pantoprazole  Injectable 40 milliGRAM(s) IV Push daily  dextrose 50% Injectable 25 Gram(s) IV Push once  dextrose 50% Injectable 12.5 Gram(s) IV Push once  dextrose 50% Injectable 25 Gram(s) IV Push once  dextrose Oral Gel 15 Gram(s) Oral once PRN  glucagon  Injectable 1 milliGRAM(s) IntraMuscular once  insulin lispro (ADMELOG) corrective regimen sliding scale   SubCutaneous every 6 hours  dextrose 5%. 1000 milliLiter(s) IV Continuous <Continuous>  chlorhexidine 0.12% Liquid 15 milliLiter(s) Oral Mucosa every 12 hours        Mode: AC/ CMV (Assist Control/ Continuous Mandatory Ventilation)  RR (machine): 20  TV (machine): 400  FiO2: 30  PEEP: 5  ITime: 1  MAP: 13  PIP: 37      ICU Vital Signs Last 24 Hrs  T(C): 36.8 (06 Jan 2023 22:00), Max: 36.8 (06 Jan 2023 22:00)  T(F): 98.2 (06 Jan 2023 22:00), Max: 98.2 (06 Jan 2023 22:00)  HR: 85 (06 Jan 2023 19:48) (70 - 119)  BP: 126/81 (06 Jan 2023 19:00) (107/91 - 154/108)  BP(mean): 94 (06 Jan 2023 19:00) (94 - 128)  RR: 19 (06 Jan 2023 19:00) (14 - 35)  SpO2: 98% (06 Jan 2023 19:48) (91% - 100%)    O2 Parameters below as of 06 Jan 2023 19:48  Patient On (Oxygen Delivery Method): ventilator,.30            ABG - ( 06 Jan 2023 06:41 )  pH, Arterial: 7.39  pH, Blood: x     /  pCO2: 28    /  pO2: 65    / HCO3: 17    / Base Excess: -8.1  /  SaO2: 96.1                I&O's Detail    05 Jan 2023 07:01  -  06 Jan 2023 07:00  --------------------------------------------------------  IN:    Dexmedetomidine: 35.7 mL    Dexmedetomidine: 290.1 mL    DOBUTamine: 353.8 mL    Nepro with Carb Steady: 520 mL    Propofol: 85.3 mL  Total IN: 1284.9 mL    OUT:    Chest Tube (mL): 10 mL    Chest Tube (mL): 55 mL    Indwelling Catheter - Urethral (mL): 1605 mL  Total OUT: 1670 mL    Total NET: -385.1 mL      06 Jan 2023 07:01  -  06 Jan 2023 21:28  --------------------------------------------------------  IN:    DOBUTamine: 317.2 mL    Propofol: 357.4 mL  Total IN: 674.6 mL    OUT:    Chest Tube (mL): 0 mL    Chest Tube (mL): 10 mL    Dexmedetomidine: 0 mL    Indwelling Catheter - Urethral (mL): 550 mL  Total OUT: 560 mL    Total NET: 114.6 mL            LABS:                        10.9   19.58 )-----------( 218      ( 06 Jan 2023 06:01 )             32.2     01-06    139  |  104  |  110<H>  ----------------------------<  121<H>  3.7   |  18<L>  |  4.86<H>    Ca    8.9      06 Jan 2023 06:01  Phos  5.8     01-06  Mg     2.8     01-06    TPro  6.6  /  Alb  2.1<L>  /  TBili  0.8  /  DBili  x   /  AST  39  /  ALT  91<H>  /  AlkPhos  270<H>  01-06          CAPILLARY BLOOD GLUCOSE      POCT Blood Glucose.: 110 mg/dL (06 Jan 2023 17:20)        CULTURES:      Physical Examination:    General: NAD. Unresponsive     HEENT: Pupils equal, reactive to light.  Symmetric.    PULM: Clear to auscultation bilaterally, no significant sputum production    CVS: Regular rate and rhythm, no murmurs, rubs, or gallops    ABD: Soft, nondistended, nontender, normoactive bowel sounds, no masses    EXT: No edema, nontender    SKIN: Warm and well perfused, no rashes noted.    NEURO: Sedated     RADIOLOGY: < from: Xray Chest 1 View- PORTABLE-Urgent (Xray Chest 1 View- PORTABLE-Urgent .) (01.06.23 @ 16:23) >  ACC: [id number] EXAM:  XR CHEST PORTABLE URGENT 1V                          PROCEDURE DATE:  01/06/2023          INTERPRETATION:  Blank    Portable chest radiograph    CLINICAL INFORMATION: Dyspnea, shortness of breath.    TECHNIQUE:  Portable  AP chest radiograph.    COMPARISON: None. .    FINDINGS:  CATHETERS AND TUBES: LEFT chest tube tip overlies LEFT apex.  ET tube tip above tracheal bifurcation.  NG tube tip beyond GE junction.      PULMONARY: Moderate RIGHT effusion and/or basilar airspaceconsolidation   obscures RIGHT peripheral diaphragm contour.   RIGHT upper zone and LEFT lung parenchyma grossly clear.  .   No pneumothorax.    HEART/VASCULAR: Vascular heartbeat.    BONES: Visualized osseous structures are intact.    IMPRESSION: Nosignificant interval change.  Catheters and tubes in place.  Residual moderate RIGHT effusion and/or basilar airspace consolidation.  Cardiomegaly..    --- End of Report ---    < end of copied text >      CRITICAL CARE TIME SPENT: 32min   Evaluating/treating patient, reviewing data/labs/imaging, discussing case with multidisciplinary team, discussing plan/goals of care with patient/family. Non-inclusive of procedure time.

## 2023-01-06 NOTE — PROGRESS NOTE ADULT - ASSESSMENT
a/p resp failure  ftt    plan  in an dout  may need peg to be placed  gerd precautions  continue ngt   ppi once a day    Advanced care planning was discussed with patient and family.  Advanced care planning forms were reviewed and discussed.  Risks, benefits and alternatives of gastroenterologic procedures were discussed in detail and all questions were answered.    30 minutes spent.

## 2023-01-06 NOTE — PROGRESS NOTE ADULT - ASSESSMENT
61 y/o M with PMHx of HTN, dilated cardiomyopathy s/p AICD, CKD, opoid/substance abuse, and a fib/a flutter who presented to ER on 12/23 with shortness of breath and altered mental status from nursing home.    Assessment:  1. Acute hypoxic respiratory failure  2. Cardiac arrest  3. Pseudomonas pneumonia   4. Left sided PTX  5. Transaminitis   6. acute systolic heart failure     Plan:  Neuro: Sedated on propofol for vent synchrony.  PRN fentanyl for agitation  CV: Severly reduced EF <10%, on Dobutamine @ 5mcg to improve urine output. Monitoring end points of perfusion. Maintain MAP >65  Resp: Intubated on AC/VC, acitevly titrating FiO2 to maintain O2 sat >92%. Vent bundle in place. Daily SAT/ SBT.   - PTX, chest tube replaced w/ pig tail. Remains on suction.  CXR w/out PTX.   GI: PPI daily. Tolerating tube feeds   Renal: DERRICK on CKD, improved. Making good urine w/ assistance of inotrope. Trend BUN/Crt. Lytes stable   ID: On Meropenum for pseudomonas  pneumonia. blood Cultures  w/ no growth to date.

## 2023-01-06 NOTE — PROVIDER CONTACT NOTE (EICU) - BACKGROUND
The patient is a 62 year old male with a history of CAD, chronic systolic heart failure s/p ICD, atrial flutter s/p DCCV, substance abuse, CKD who is admitted with respiratory failure in the setting of tension pneumothorax complicated by cardiac arrest.

## 2023-01-06 NOTE — PROGRESS NOTE ADULT - SUBJECTIVE AND OBJECTIVE BOX
Patient is a 62y old  Male who presents with a chief complaint of resp failure (26 Dec 2022 09:47)    HPI: 62M CAD, Dilated cardiomyopathy, S/p AICD, Afib/flutter, h/o substance abuse, CKD baseline creat approx 1.4 last admitted to [facility] with MSSA bacteremia, and CHF.  Sent from [facility] with acute hypoxic respiratory failure.  Was initially on BiPAP then became hypoxic.  Anesthesia intubated patient.  On post intubation Xray pneumothorax evident.  Patient with PEA arrest.  Chest tube placed by ED physician.  ROSC obtained.     Patient unable to give further history.      Intetrval history -     Intubated on vent.   Responsive to stimuli    MEDICATIONS  (STANDING):    albuterol    90 MICROgram(s) HFA Inhaler 2 Puff(s) Inhalation every 6 hours  albuterol/ipratropium for Nebulization. 3 milliLiter(s) Nebulizer once  aMIOdarone    Tablet 200 milliGRAM(s) Oral daily  aspirin  chewable 81 milliGRAM(s) Oral daily  chlorhexidine 0.12% Liquid 15 milliLiter(s) Oral Mucosa every 12 hours  dexMEDEtomidine Infusion 0.2 MICROgram(s)/kG/Hr (4.08 mL/Hr) IV Continuous <Continuous>  dextrose 5%. 1000 milliLiter(s) (100 mL/Hr) IV Continuous <Continuous>  dextrose 50% Injectable 25 Gram(s) IV Push once  dextrose 50% Injectable 12.5 Gram(s) IV Push once  dextrose 50% Injectable 25 Gram(s) IV Push once  DOBUTamine Infusion 5 MICROgram(s)/kG/Min (12.2 mL/Hr) IV Continuous <Continuous>  glucagon  Injectable 1 milliGRAM(s) IntraMuscular once  insulin lispro (ADMELOG) corrective regimen sliding scale   SubCutaneous every 6 hours  ipratropium 17 MICROgram(s) HFA Inhaler 1 Puff(s) Inhalation every 6 hours  meropenem  IVPB 500 milliGRAM(s) IV Intermittent every 12 hours  pantoprazole  Injectable 40 milliGRAM(s) IV Push daily    MEDICATIONS  (PRN):    dextrose Oral Gel 15 Gram(s) Oral once PRN Blood Glucose LESS THAN 70 milliGRAM(s)/deciliter  fentaNYL    Injectable 25 MICROGram(s) IV Push every 2 hours PRN pain or vent synchrony  midazolam Injectable 2 milliGRAM(s) IV Push every 2 hours PRN Agitation    Allergies    No Known Allergies    Intolerances    pork (Other)    REVIEW OF SYSTEMS: UTO    PHYSICAL EXAM:    Vital Signs Last 24 Hrs    T(C): 36.7 (05 Jan 2023 16:02), Max: 37 (05 Jan 2023 04:06)  T(F): 98 (05 Jan 2023 16:02), Max: 98.6 (05 Jan 2023 04:06)  HR: 86 (05 Jan 2023 18:28) (79 - 123)  BP: 140/84 (05 Jan 2023 18:00) (132/83 - 146/94)  BP(mean): 99 (05 Jan 2023 18:00) (97 - 112)  RR: 28 (05 Jan 2023 18:00) (13 - 29)  SpO2: 96% (05 Jan 2023 18:28) (94% - 100%)    Parameters below as of 05 Jan 2023 13:36  Patient On (Oxygen Delivery Method): ventilator,30    On Neurological Examination:    Intubated on vent.  On sedation.  Pupils are 3 mm, sluggishly reacting to light.  Neck is supple.  No abn body movements.    LABS:  CBC Full  -  ( 06 Jan 2023 06:01 )  WBC Count : 19.58 K/uL  RBC Count : 3.56 M/uL  Hemoglobin : 10.9 g/dL  Hematocrit : 32.2 %  Platelet Count - Automated : 218 K/uL  Mean Cell Volume : 90.4 fl  Mean Cell Hemoglobin : 30.6 pg  Mean Cell Hemoglobin Concentration : 33.9 gm/dL  Auto Neutrophil # : 17.04 K/uL  Auto Lymphocyte # : 0.62 K/uL  Auto Monocyte # : 1.53 K/uL  Auto Eosinophil # : 0.07 K/uL  Auto Basophil # : 0.03 K/uL  Auto Neutrophil % : 86.9 %  Auto Lymphocyte % : 3.2 %  Auto Monocyte % : 7.8 %  Auto Eosinophil % : 0.4 %  Auto Basophil % : 0.2 %    01-06    139  |  104  |  110<H>  ----------------------------<  121<H>  3.7   |  18<L>  |  4.86<H>    Ca    8.9      06 Jan 2023 06:01  Phos  5.8     01-06  Mg     2.8     01-06    TPro  6.6  /  Alb  2.1<L>  /  TBili  0.8  /  DBili  x   /  AST  39  /  ALT  91<H>  /  AlkPhos  270<H>  01-06    RADIOLOGY & ADDITIONAL STUDIES:    < from: TTE Echo Complete w/o Contrast w/ Doppler (02.13.22 @ 09:00) >    1. Severe left ventricular systolic dysfunction. EF 10 %  2. Dilated left ventricle, left atrium, right atrium.  3. Moderate mitral regurgitation.  4. Moderate tricuspid regurgitation.  5. Mild pulmonary hypertension.  6. No evidence of endocarditis on this transthoracic study.    < end of copied text >    r< from: CT Head No Cont (12.29.22 @ 13:20) >    IMPRESSION: No acute intracranial hemorrhage, mass effect, or shift of the midline structures.    Similar-appearing mild chronic white matter microvascular type changes.    < end of copied text >    < from: CT Head No Cont (12.23.22 @ 21:18) >    IMPRESSION:    No large territory acute infarct, intracranial hemorrhage, or mass effect.    Slight progression of chronic microangiopathic white matter changes as compared to prior study from 2016.    < end of copied text >    < from: CT Chest No Cont (12.23.22 @ 21:19) >    IMPRESSION:    *  Left chest tube with residual small left pneumothorax. No evidence of tension.  *  Right basilar rounded atelectasis again noted with chronic small loculated right pleural effusion.  *  Small pneumomediastinum. Left chest and abdominal wall emphysema. Moderate pneumoretroperitoneum and properitoneal air. No pneumoperitoneum. Findings are compatible with barotrauma.  *  Evidence of urinary bladder cystitis.    < end of copied text >

## 2023-01-06 NOTE — PROGRESS NOTE ADULT - SUBJECTIVE AND OBJECTIVE BOX
INTERVAL HPI/OVERNIGHT EVENTS:  No new overnight event.  No N/V/D.  Tolerating diet via ngt    Allergies    No Known Allergies    Intolerances    pork (Other)    General:  No wt loss, fevers, chills, night sweats, fatigue,   Eyes:  Good vision, no reported pain  ENT:  No sore throat, pain, runny nose, dysphagia  CV:  No pain, palpitations, hypo/hypertension  Resp:  No dyspnea, cough, tachypnea, wheezing  GI:  No pain, No nausea, No vomiting, No diarrhea, No constipation, No weight loss, No fever, No pruritis, No rectal bleeding, No tarry stools, No dysphagia,  :  No pain, bleeding, incontinence, nocturia  Muscle:  No pain, weakness  Neuro:  No weakness, tingling, memory problems  Psych:  No fatigue, insomnia, mood problems, depression  Endocrine:  No polyuria, polydipsia, cold/heat intolerance  Heme:  No petechiae, ecchymosis, easy bruisability  Skin:  No rash, tattoos, scars, edema      PHYSICAL EXAM:   Vital Signs:  Vital Signs Last 24 Hrs  T(C): 36.1 (2023 08:26), Max: 36.7 (2023 12:32)  T(F): 97 (2023 08:26), Max: 98.1 (2023 20:10)  HR: 79 (2023 09:00) (79 - 123)  BP: 133/86 (2023 09:00) (130/90 - 150/88)  BP(mean): 101 (2023 09:00) (99 - 112)  RR: 15 (2023 09:00) (14 - 35)  SpO2: 97% (2023 09:00) (91% - 99%)    Parameters below as of 2023 07:44  Patient On (Oxygen Delivery Method): ventilator,40      Daily     Daily Weight in k (2023 04:06)I&O's Summary    2023 07:01  -  2023 07:00  --------------------------------------------------------  IN: 1258 mL / OUT: 1670 mL / NET: -412 mL        GENERAL:  Appears stated age, well-groomed, well-nourished, no distress  HEENT:  NC/AT,  conjunctivae clear and pink, no thyromegaly, nodules, adenopathy, no JVD, sclera -anicteric  CHEST:  Full & symmetric excursion, no increased effort, breath sounds clear  HEART:  Regular rhythm, S1, S2, no murmur/rub/S3/S4, no abdominal bruit, no edema  ABDOMEN:  Soft, non-tender, non-distended, normoactive bowel sounds,  no masses ,no hepato-splenomegaly, no signs of chronic liver disease  EXTEREMITIES:  no cyanosis,clubbing or edema  SKIN:  No rash/erythema/ecchymoses/petechiae/wounds/abscess/warm/dry  NEURO:  Alert, oriented, no asterixis, no tremor, no encephalopathy      LABS:                        10.9   19.58 )-----------( 218      ( 2023 06:01 )             32.2     01-06    139  |  104  |  110<H>  ----------------------------<  121<H>  3.7   |  18<L>  |  4.86<H>    Ca    8.9      2023 06:01  Phos  5.8     01-06  Mg     2.8     01-06    TPro  6.6  /  Alb  2.1<L>  /  TBili  0.8  /  DBili  x   /  AST  39  /  ALT  91<H>  /  AlkPhos  270<H>  01-06        amylase   lipase  RADIOLOGY & ADDITIONAL TESTS:

## 2023-01-06 NOTE — PROGRESS NOTE ADULT - SUBJECTIVE AND OBJECTIVE BOX
Patient is a 62y old  Male who presents with a chief complaint of resp failure (06 Jan 2023 09:09)      Subjective:  INTERVAL HPI/OVERNIGHT EVENTS: Patient seen and examined at bedside.  Patient has no complaints at this time.   MEDICATIONS  (STANDING):  albuterol    90 MICROgram(s) HFA Inhaler 2 Puff(s) Inhalation every 6 hours  albuterol/ipratropium for Nebulization. 3 milliLiter(s) Nebulizer once  aMIOdarone    Tablet 200 milliGRAM(s) Oral daily  aspirin  chewable 81 milliGRAM(s) Oral daily  chlorhexidine 0.12% Liquid 15 milliLiter(s) Oral Mucosa every 12 hours  dexMEDEtomidine Infusion 0.2 MICROgram(s)/kG/Hr (4.08 mL/Hr) IV Continuous <Continuous>  dextrose 5%. 1000 milliLiter(s) (100 mL/Hr) IV Continuous <Continuous>  dextrose 50% Injectable 25 Gram(s) IV Push once  dextrose 50% Injectable 12.5 Gram(s) IV Push once  dextrose 50% Injectable 25 Gram(s) IV Push once  DOBUTamine Infusion 5 MICROgram(s)/kG/Min (12.2 mL/Hr) IV Continuous <Continuous>  glucagon  Injectable 1 milliGRAM(s) IntraMuscular once  insulin lispro (ADMELOG) corrective regimen sliding scale   SubCutaneous every 6 hours  ipratropium 17 MICROgram(s) HFA Inhaler 1 Puff(s) Inhalation every 6 hours  meropenem  IVPB 500 milliGRAM(s) IV Intermittent every 12 hours  pantoprazole  Injectable 40 milliGRAM(s) IV Push daily  propofol Infusion 10 MICROgram(s)/kG/Min (4.9 mL/Hr) IV Continuous <Continuous>    MEDICATIONS  (PRN):  dextrose Oral Gel 15 Gram(s) Oral once PRN Blood Glucose LESS THAN 70 milliGRAM(s)/deciliter  fentaNYL    Injectable 25 MICROGram(s) IV Push every 2 hours PRN pain or vent synchrony  midazolam Injectable 2 milliGRAM(s) IV Push every 2 hours PRN Agitation      Allergies    No Known Allergies    Intolerances    pork (Other)      REVIEW OF SYSTEMS:  CONSTITUTIONAL: No fever or chills  HEENT:  No headache, no sore throat  RESPIRATORY: No cough or shortness of breath  CARDIOVASCULAR: No chest pain or palpitations  GASTROINTESTINAL: No abd pain, nausea, vomiting, or diarrhea      Objective:  Vital Signs Last 24 Hrs  T(C): 36.1 (06 Jan 2023 08:26), Max: 36.7 (05 Jan 2023 12:32)  T(F): 97 (06 Jan 2023 08:26), Max: 98.1 (05 Jan 2023 20:10)  HR: 79 (06 Jan 2023 09:00) (79 - 123)  BP: 133/86 (06 Jan 2023 09:00) (130/90 - 150/88)  BP(mean): 101 (06 Jan 2023 09:00) (99 - 112)  RR: 15 (06 Jan 2023 09:00) (14 - 35)  SpO2: 97% (06 Jan 2023 09:00) (91% - 99%)    Parameters below as of 06 Jan 2023 07:44  Patient On (Oxygen Delivery Method): ventilator,40        GENERAL: NAD, lying in bed comfortably  HEAD:  Normocephalic  EYES:  conjunctiva and sclera clear  ENT: Moist mucous membranes  NECK: Supple  CHEST/LUNG: Clear to auscultation bilaterally; No rales or rhonchi; no wheezing. Unlabored respirations  HEART: Regular rate and rhythm; S1S2+  ABDOMEN: Bowel sounds present; Soft, Nontender, Nondistended.   EXTREMITIES:  + distal Peripheral Pulses;  No cyanosis, or edema  NERVOUS SYSTEM:  Alert & Oriented X3;  No gross focal deficits   MSK: moves all extremities  SKIN: No rashes     LABS:                        10.9   19.58 )-----------( 218      ( 06 Jan 2023 06:01 )             32.2     06 Jan 2023 06:01    139    |  104    |  110    ----------------------------<  121    3.7     |  18     |  4.86     Ca    8.9        06 Jan 2023 06:01  Phos  5.8       06 Jan 2023 00:47  Mg     2.8       06 Jan 2023 00:47    TPro  6.6    /  Alb  2.1    /  TBili  0.8    /  DBili  x      /  AST  39     /  ALT  91     /  AlkPhos  270    06 Jan 2023 06:01        CAPILLARY BLOOD GLUCOSE      POCT Blood Glucose.: 124 mg/dL (06 Jan 2023 05:19)  POCT Blood Glucose.: 127 mg/dL (05 Jan 2023 23:33)  POCT Blood Glucose.: 143 mg/dL (05 Jan 2023 17:22)  POCT Blood Glucose.: 149 mg/dL (05 Jan 2023 11:36)          RADIOLOGY & ADDITIONAL TESTS:    Personally reviewed.     Consultant(s) Notes Reviewed:  [x] YES  [ ] NO    Plan of care discussed with patient /family; all questions answered   Patient is a 62y old  Male who presents with a chief complaint of resp failure (06 Jan 2023 09:09)      Subjective:  INTERVAL HPI/OVERNIGHT EVENTS: Patient seen and examined at bedside.  patient is sedated    MEDICATIONS  (STANDING):  albuterol    90 MICROgram(s) HFA Inhaler 2 Puff(s) Inhalation every 6 hours  albuterol/ipratropium for Nebulization. 3 milliLiter(s) Nebulizer once  aMIOdarone    Tablet 200 milliGRAM(s) Oral daily  aspirin  chewable 81 milliGRAM(s) Oral daily  chlorhexidine 0.12% Liquid 15 milliLiter(s) Oral Mucosa every 12 hours  dexMEDEtomidine Infusion 0.2 MICROgram(s)/kG/Hr (4.08 mL/Hr) IV Continuous <Continuous>  dextrose 5%. 1000 milliLiter(s) (100 mL/Hr) IV Continuous <Continuous>  dextrose 50% Injectable 25 Gram(s) IV Push once  dextrose 50% Injectable 12.5 Gram(s) IV Push once  dextrose 50% Injectable 25 Gram(s) IV Push once  DOBUTamine Infusion 5 MICROgram(s)/kG/Min (12.2 mL/Hr) IV Continuous <Continuous>  glucagon  Injectable 1 milliGRAM(s) IntraMuscular once  insulin lispro (ADMELOG) corrective regimen sliding scale   SubCutaneous every 6 hours  ipratropium 17 MICROgram(s) HFA Inhaler 1 Puff(s) Inhalation every 6 hours  meropenem  IVPB 500 milliGRAM(s) IV Intermittent every 12 hours  pantoprazole  Injectable 40 milliGRAM(s) IV Push daily  propofol Infusion 10 MICROgram(s)/kG/Min (4.9 mL/Hr) IV Continuous <Continuous>    MEDICATIONS  (PRN):  dextrose Oral Gel 15 Gram(s) Oral once PRN Blood Glucose LESS THAN 70 milliGRAM(s)/deciliter  fentaNYL    Injectable 25 MICROGram(s) IV Push every 2 hours PRN pain or vent synchrony  midazolam Injectable 2 milliGRAM(s) IV Push every 2 hours PRN Agitation      Allergies    No Known Allergies    Intolerances    pork (Other)      REVIEW OF SYSTEMS:  not able to obtain due to altered mentation       Objective:  Vital Signs Last 24 Hrs  T(C): 36.1 (06 Jan 2023 08:26), Max: 36.7 (05 Jan 2023 12:32)  T(F): 97 (06 Jan 2023 08:26), Max: 98.1 (05 Jan 2023 20:10)  HR: 79 (06 Jan 2023 09:00) (79 - 123)  BP: 133/86 (06 Jan 2023 09:00) (130/90 - 150/88)  BP(mean): 101 (06 Jan 2023 09:00) (99 - 112)  RR: 15 (06 Jan 2023 09:00) (14 - 35)  SpO2: 97% (06 Jan 2023 09:00) (91% - 99%)    Parameters below as of 06 Jan 2023 07:44  Patient On (Oxygen Delivery Method): ventilator,40        GENERAL: lying in bed.   HEAD:  Normocephalic  EYES:  conjunctiva and sclera clear  ENT: Moist mucous membranes  NECK: Supple  CHEST/LUNG: coarse BS ,  No rales or rhonchi; no wheezing. Unlabored respirations, + chest tube   HEART: Regular rate and rhythm; S1S2+  ABDOMEN: Bowel sounds present; Soft   EXTREMITIES:  No cyanosis,  NERVOUS SYSTEM:  sedated    MSK: no spontaneous limb movement noticed    SKIN: No rashes     LABS:                        10.9   19.58 )-----------( 218      ( 06 Jan 2023 06:01 )             32.2     06 Jan 2023 06:01    139    |  104    |  110    ----------------------------<  121    3.7     |  18     |  4.86     Ca    8.9        06 Jan 2023 06:01  Phos  5.8       06 Jan 2023 00:47  Mg     2.8       06 Jan 2023 00:47    TPro  6.6    /  Alb  2.1    /  TBili  0.8    /  DBili  x      /  AST  39     /  ALT  91     /  AlkPhos  270    06 Jan 2023 06:01        CAPILLARY BLOOD GLUCOSE      POCT Blood Glucose.: 124 mg/dL (06 Jan 2023 05:19)  POCT Blood Glucose.: 127 mg/dL (05 Jan 2023 23:33)  POCT Blood Glucose.: 143 mg/dL (05 Jan 2023 17:22)  POCT Blood Glucose.: 149 mg/dL (05 Jan 2023 11:36)        Consultant(s) Notes Reviewed:  [x] YES  [ ] NO       Patient is a 62y old  Male who presents with a chief complaint of resp failure (06 Jan 2023 09:09)      Subjective:  INTERVAL HPI/OVERNIGHT EVENTS: Patient seen and examined at bedside.  patient is sedated.     MEDICATIONS  (STANDING):  albuterol    90 MICROgram(s) HFA Inhaler 2 Puff(s) Inhalation every 6 hours  albuterol/ipratropium for Nebulization. 3 milliLiter(s) Nebulizer once  aMIOdarone    Tablet 200 milliGRAM(s) Oral daily  aspirin  chewable 81 milliGRAM(s) Oral daily  chlorhexidine 0.12% Liquid 15 milliLiter(s) Oral Mucosa every 12 hours  dexMEDEtomidine Infusion 0.2 MICROgram(s)/kG/Hr (4.08 mL/Hr) IV Continuous <Continuous>  dextrose 5%. 1000 milliLiter(s) (100 mL/Hr) IV Continuous <Continuous>  dextrose 50% Injectable 25 Gram(s) IV Push once  dextrose 50% Injectable 12.5 Gram(s) IV Push once  dextrose 50% Injectable 25 Gram(s) IV Push once  DOBUTamine Infusion 5 MICROgram(s)/kG/Min (12.2 mL/Hr) IV Continuous <Continuous>  glucagon  Injectable 1 milliGRAM(s) IntraMuscular once  insulin lispro (ADMELOG) corrective regimen sliding scale   SubCutaneous every 6 hours  ipratropium 17 MICROgram(s) HFA Inhaler 1 Puff(s) Inhalation every 6 hours  meropenem  IVPB 500 milliGRAM(s) IV Intermittent every 12 hours  pantoprazole  Injectable 40 milliGRAM(s) IV Push daily  propofol Infusion 10 MICROgram(s)/kG/Min (4.9 mL/Hr) IV Continuous <Continuous>    MEDICATIONS  (PRN):  dextrose Oral Gel 15 Gram(s) Oral once PRN Blood Glucose LESS THAN 70 milliGRAM(s)/deciliter  fentaNYL    Injectable 25 MICROGram(s) IV Push every 2 hours PRN pain or vent synchrony  midazolam Injectable 2 milliGRAM(s) IV Push every 2 hours PRN Agitation      Allergies    No Known Allergies    Intolerances    pork (Other)      REVIEW OF SYSTEMS:  not able to obtain due to altered mentation       Objective:  Vital Signs Last 24 Hrs  T(C): 36.1 (06 Jan 2023 08:26), Max: 36.7 (05 Jan 2023 12:32)  T(F): 97 (06 Jan 2023 08:26), Max: 98.1 (05 Jan 2023 20:10)  HR: 79 (06 Jan 2023 09:00) (79 - 123)  BP: 133/86 (06 Jan 2023 09:00) (130/90 - 150/88)  BP(mean): 101 (06 Jan 2023 09:00) (99 - 112)  RR: 15 (06 Jan 2023 09:00) (14 - 35)  SpO2: 97% (06 Jan 2023 09:00) (91% - 99%)    Parameters below as of 06 Jan 2023 07:44  Patient On (Oxygen Delivery Method): ventilator,40        GENERAL: lying in bed.   HEAD:  Normocephalic  EYES:  conjunctiva and sclera clear  ENT: Moist mucous membranes  NECK: Supple  CHEST/LUNG: coarse BS ,  No rales or rhonchi; no wheezing. Unlabored respirations, + chest tube   HEART: Regular rate and rhythm; S1S2+  ABDOMEN: Bowel sounds present; Soft   EXTREMITIES:  No cyanosis,  NERVOUS SYSTEM:  sedated,   MSK: Pt seem to Move left hand and foot on command on occasion. No movements seen on right side.   SKIN: No rashes     LABS:                        10.9   19.58 )-----------( 218      ( 06 Jan 2023 06:01 )             32.2     06 Jan 2023 06:01    139    |  104    |  110    ----------------------------<  121    3.7     |  18     |  4.86     Ca    8.9        06 Jan 2023 06:01  Phos  5.8       06 Jan 2023 00:47  Mg     2.8       06 Jan 2023 00:47    TPro  6.6    /  Alb  2.1    /  TBili  0.8    /  DBili  x      /  AST  39     /  ALT  91     /  AlkPhos  270    06 Jan 2023 06:01        CAPILLARY BLOOD GLUCOSE      POCT Blood Glucose.: 124 mg/dL (06 Jan 2023 05:19)  POCT Blood Glucose.: 127 mg/dL (05 Jan 2023 23:33)  POCT Blood Glucose.: 143 mg/dL (05 Jan 2023 17:22)  POCT Blood Glucose.: 149 mg/dL (05 Jan 2023 11:36)        Consultant(s) Notes Reviewed:  [x] YES  [ ] NO

## 2023-01-06 NOTE — PROGRESS NOTE ADULT - SUBJECTIVE AND OBJECTIVE BOX
DOROTHY VOSS is a 62yMale , patient examined and chart reviewed.     INTERVAL HPI/ OVERNIGHT EVENTS:   Afebrile. Remains Vented sedated.      PAST MEDICAL & SURGICAL HISTORY:  Heart failure, systolic  CAD (coronary artery disease)  Hypertension  Nonischemic cardiomyopathy  COPD, moderate  2019 novel coronavirus disease (COVID-19)  Substance abuse  HLD (hyperlipidemia)  Cardiac LV ejection fraction 10-20%  AICD (automatic cardioverter/defibrillator) present  Cardiac LV ejection fraction 10-20%        For details regarding the patient's social history, family history, and other miscellaneous elements, please refer the initial infectious diseases consultation and/or the admitting history and physical examination for this admission.    ROS:  Unable to obtain due to : pt's condition    ALLERGIES  pork (Other)      Current inpatient medications :    ANTIBIOTICS/RELEVANT:  meropenem  IVPB 500 milliGRAM(s) IV Intermittent every 12 hours    MEDICATIONS  (STANDING):  albuterol    90 MICROgram(s) HFA Inhaler 2 Puff(s) Inhalation every 6 hours  albuterol/ipratropium for Nebulization. 3 milliLiter(s) Nebulizer once  aMIOdarone    Tablet 200 milliGRAM(s) Oral daily  aspirin  chewable 81 milliGRAM(s) Oral daily  chlorhexidine 0.12% Liquid 15 milliLiter(s) Oral Mucosa every 12 hours  dextrose 5%. 1000 milliLiter(s) (100 mL/Hr) IV Continuous <Continuous>  dextrose 50% Injectable 25 Gram(s) IV Push once  dextrose 50% Injectable 25 Gram(s) IV Push once  dextrose 50% Injectable 12.5 Gram(s) IV Push once  DOBUTamine Infusion 5 MICROgram(s)/kG/Min (12.2 mL/Hr) IV Continuous <Continuous>  glucagon  Injectable 1 milliGRAM(s) IntraMuscular once  glycopyrrolate Injectable 0.1 milliGRAM(s) IV Push once  insulin lispro (ADMELOG) corrective regimen sliding scale   SubCutaneous every 6 hours  ipratropium 17 MICROgram(s) HFA Inhaler 1 Puff(s) Inhalation every 6 hours  pantoprazole  Injectable 40 milliGRAM(s) IV Push daily  propofol Infusion 10 MICROgram(s)/kG/Min (4.9 mL/Hr) IV Continuous <Continuous>    MEDICATIONS  (PRN):  dextrose Oral Gel 15 Gram(s) Oral once PRN Blood Glucose LESS THAN 70 milliGRAM(s)/deciliter  fentaNYL    Injectable 25 MICROGram(s) IV Push every 2 hours PRN pain or vent synchrony  midazolam Injectable 2 milliGRAM(s) IV Push every 2 hours PRN Agitation          Objective:  Vital Signs Last 24 Hrs  T(C): 36.8 (06 Jan 2023 22:00), Max: 36.8 (06 Jan 2023 22:00)  T(F): 98.2 (06 Jan 2023 22:00), Max: 98.2 (06 Jan 2023 22:00)  HR: 81 (06 Jan 2023 21:00) (70 - 119)  BP: 132/81 (06 Jan 2023 21:00) (107/91 - 154/108)  BP(mean): 97 (06 Jan 2023 21:00) (93 - 128)  RR: 16 (06 Jan 2023 21:00) (14 - 35)  SpO2: 98% (06 Jan 2023 21:00) (91% - 100%)    Parameters below as of 06 Jan 2023 21:00  Patient On (Oxygen Delivery Method): ventilator    O2 Concentration (%): 30  Mode: AC/ CMV (Assist Control/ Continuous Mandatory Ventilation), RR (machine): 20, TV (machine): 400, FiO2: 30, PEEP: 5, ITime: 1, MAP: 10, PIP: 15    Physical Exam:  General: vented sedated +NGT  Neck: supple, trachea midline  Lungs: decreased no wheeze/rhonchi Left chest tubes x 2  Cardiovascular: regular rate and rhythm, S1 S2  Abdomen: soft, nontender,  bowel sounds normal  Neurological: sedated  Skin: no rash  Extremities: +edema        LABS:                            10.9   19.58 )-----------( 218      ( 06 Jan 2023 06:01 )             32.2   01-06    139  |  104  |  110<H>  ----------------------------<  121<H>  3.7   |  18<L>  |  4.86<H>    Ca    8.9      06 Jan 2023 06:01  Phos  5.8     01-06  Mg     2.8     01-06    TPro  6.6  /  Alb  2.1<L>  /  TBili  0.8  /  DBili  x   /  AST  39  /  ALT  91<H>  /  AlkPhos  270<H>  01-06      MICROBIOLOGY:  Culture - Sputum . (12.24.22 @ 17:43)    Gram Stain:   Numerous polymorphonuclear leukocytes per low power field  No Squamous epithelial cells per low power field  Rare Gram Positive Rods seen per oil power field    -  Amikacin: S <=16    -  Aztreonam: S 8    -  Cefepime: S <=2    -  Ceftazidime: S 4    -  Ciprofloxacin: S <=0.25    -  Gentamicin: S 4    -  Imipenem: S <=1    -  Levofloxacin: S <=0.5    -  Meropenem: S <=1    -  Piperacillin/Tazobactam: S <=8    -  Tobramycin: S <=2    Specimen Source: ET Tube ET Tube    Culture Results:   Rare Pseudomonas aeruginosa  Normal Respiratory Cathy absent    Organism Identification: Pseudomonas aeruginosa    Organism: Pseudomonas aeruginosa    Method Type: BAY    Culture - Urine (12.23.22 @ 11:16)    -  Tobramycin: S <=2    -  Amoxicillin/Clavulanic Acid: S <=8/4    -  Ampicillin: R 16 These ampicillin results predict results for amoxicillin    -  Ampicillin/Sulbactam: S <=4/2 Enterobacter, Klebsiella aerogenes, Citrobacter, and Serratia may develop resistance during prolonged therapy (3-4 days)    -  Amikacin: S <=16    -  Cefazolin: S <=2    -  Cefoxitin: S <=8    -  Aztreonam: S <=4    -  Cefepime: S <=2    -  Imipenem: S <=1    -  Levofloxacin: S <=0.5    -  Ceftriaxone: S <=1 Enterobacter, Klebsiella aerogenes, Citrobacter, and Serratia may develop resistance during prolonged therapy    -  Ciprofloxacin: S <=0.25    -  Trimethoprim/Sulfamethoxazole: S <=0.5/9.5    -  Meropenem: S <=1    -  Nitrofurantoin: S <=32 Should not be used to treat pyelonephritis    -  Ertapenem: S <=0.5    -  Gentamicin: S <=2    -  Piperacillin/Tazobactam: S <=8    Specimen Source: Clean Catch Clean Catch (Midstream)    Culture Results:   >100,000 CFU/ml Klebsiella oxytoca/Raoultella ornithinolytica    Organism Identification: Klebsiella oxytoca /Raoutella ornithinolytica    Organism: Klebsiella oxytoca /Raoutella ornithinolytica    Method Type: BAY      Culture - Blood (12.23.22 @ 14:06)    Specimen Source: .Blood Blood-Peripheral    Culture Results:   No Growth Final      RADIOLOGY & ADDITIONAL STUDIES:    ACC: [id number] EXAM:  CT CHEST                          PROCEDURE DATE:  12/29/2022          INTERPRETATION:  HISTORY: Admitting Dxs: J96.01 RESP DISTRESS    EXAMINATION: CT CHEST was performed without IV contrast.    COMPARISON: 12/3/2022.    FINDINGS:    AIRWAYS, LUNGS, PLEURA: Satisfactory positioning of endotracheal tube.   Mild central airways secretions. Unchanged small loculated left   pneumothorax. Small chronic loculated right pleural effusion unchanged.   Right basilar and right middlelobe atelectasis. Emphysema.    Left chest tube in place with distal tip along the medial and upper   pleural space.    MEDIASTINUM: Cardiomegaly. No pericardial effusion. Thoracic aorta normal   caliber.  No large mediastinal lymph nodes. ICD lead terminates in the   right ventricle.    IMAGED ABDOMEN: Enteric catheter terminates in the stomach.   Cholelithiasis. Decreased abdominal extraluminal gas.    SOFT TISSUES: Decreased subcutaneous soft tissue emphysema.    BONES: Unremarkable.      IMPRESSION:.    Unchanged small loculated left pneumothorax.    Unchanged small loculated and chronic right pleural effusion.    Decreased subcutaneous soft tissue emphysema and extraluminal abdominal   gas.      ACC: [id number] EXAM:  XR CHEST PORTABLE IMMED 1V                        ACC: [id number] EXAM:  XR CHEST PORTABLE URGENT 1V                        ACC: [id number] EXAM:  XR CHEST PORTABLE URGENT 1V                        ACC: [id number] EXAM:  XR CHEST PORTABLE IMMED 1V                          PROCEDURE DATE:  01/03/2023          INTERPRETATION:  TIME OF EXAM: January 2, 2023 at 11:46 PM and 11:47 PM.    CLINICAL INFORMATION: Status post cardiac arrest. Respiratory distress   and abnormal chest sounds.    COMPARISON:  CT scan of the chest from December 29, 2022.    TECHNIQUE:   AP Portable chest x-ray.    INTERPRETATION:    The cardiac silhouette is enlarged. There is a left chest wall ICD with   intact lead with tip in expected region of the right ventricle. The   generator obscures part of the left lung.  The mediastinum is not accurately evaluated on these images.  ET tube tip is approximately 7.4 cm above the jennie.  Enteric tube tip is near the GE junction with side port in the distal   esophagus.  Left chest tube not significantly changed in position.  No significant change in small loculated right pleural effusion and right   mid and lower lung opacities. Question small pneumothorax associated with   the pleural effusion versus interposed aerated lung. Question tiny right   apical pneumothorax versus apical bulla.  Large left pneumothorax, increased in size. Concern for tension as there   is inversion of the left hemidiaphragm and widening of the rib   interspaces on the left compared to the right. Atelectasis of underlying   lung.  No acute bony abnormality.    AP portable chest x-ray from January 3, 2023 at 12:05 AM and 12:06 AM:    CLINICAL INFORMATION: Pneumothorax.    INTERPRETATION:    ET tube tip is 5.7 cm above the jennie. Enteric tube and left chest tube   unchanged in position.  Large left pneumothorax with concerns for tension, not significantly   changed.  Right-sided findings not significantly changed.    AP portable chest x-ray from January 3, 2023 at 12:53 AM and 12:54 AM:    CLINICAL INFORMATION: Replaced left chest tube.    INTERPRETATION:    ET tube tip is 6.2 cm above the jennie.  Enteric tube tip near GE junction with side port in the distal esophagus.  Left chest tube with tip projecting over the aortic knob.  Large left pneumothorax with concerns for tension, not significantly   changed. Continued atelectasis of underlying lung.  New left subcutaneous emphysema.  Small loculated right pleural effusion with likely associated passive   atelectasis is not significantly changed. Once again a small pneumothorax   component is not excluded. In addition, continued question of minimal   right apical pneumothorax versus apical bulla.    AP portable chest x-ray from January 3, 2023 at 1:35 AM:    CLINICAL INFORMATION: Chest tube.    INTERPRETATION:    ET tube tip approximately 4 cm above the jennie.  Enteric tube tip is likely in the stomach with the side port near the GE   junction.  Left chest wall ICD again seen.  Small loculated right pleural effusion with right mid and lower lung   opacities, not significantly changed. Previous concern for small   associated pneumothorax not seen. Question tiny right apical pneumothorax   versus apical bulla, unchanged.  Left chest tube unchanged in position. Left pneumothorax has resolved.   Left subcutaneous emphysema is slightly decreased. No left pleural   effusion.        IMPRESSION:  Left chest tube unchanged in position on the most recent   image with resolution of left pneumothorax. Slight decrease in left   subcutaneous emphysema.    Enteric tube tip likely in the stomach with side port near the GE   junction. Consider advancing the enteric tube. Discussed with Dr. Dobbins   with read back at 9:03 AM on January 3, 2023 by Dr. Sarmiento.    Small loculated right pleural effusion with right mid and lower lung   opacities, possibly atelectasis, although infection not excluded, not   significantly changed.    Continued question tiny right apical pneumothorax versus apical bulla.      Assessment :  Pt is a 62M CAD, Dilated cardiomyopathy, S/p AICD, Afib/flutter, h/o substance abuse, CKD baseline creat approx 1.4 last admitted to [facility] with MSSA bacteremia, and CHF.    Sent from [facility] with acute hypoxic respiratory failure. Was initially on BiPAP then became hypoxic.  Anesthesia intubated patient.  On post intubation Xray pneumothorax evident.  Patient with PEA arrest.  Chest tube placed by ED physician.  S/p PEA arrest  AHRF requiring intubation  Aspiration PNA   Loculated pleural effusions  - imaging reviewed  - BCx negative   - Sputum Cx -- Pseudomonas  - UCx -- Klebsiella  - WBC stable, afebrile  - s/p zosyn x10d course completed 1/1/2023  - pneumothorax  - WBC up trending and hypothermic- restarted on Antibiotic- Meropenam as there was a concern for recurrent pneumonia    Plan:  - cont Meropenam x 5-7 days  - trend temps/WBC--stable  - maintain aspiration precautions  - chest tube per pulm critical care  - vent management per ICU care  - GOC per primary team  - Overall prog poor      Continue with present regiment.  Appropriate use of antibiotics and adverse effects reviewed.      Critical care > 35 minutes were spent in direct patient care reviewing notes, medications ,labs data/ imaging , discussion with multidisciplinary team.    Thank you for allowing me to participate in care of your patient .    Kelley Phan MD  Infectious Disease  [phone]

## 2023-01-06 NOTE — PROGRESS NOTE ADULT - ASSESSMENT
The patient is a 62 year old male with a history of CAD, chronic systolic heart failure s/p ICD, atrial flutter s/p DCCV, substance abuse, CKD who is admitted with respiratory failure in the setting of tension pneumothorax complicated by cardiac arrest.    Plan:  - ECG with sinus tachycardia and known LBBB  - Echo 2/22 with severely reduced LV (EF 10%) and RV function, mod MR, mod TR, mild pulm HTN  - Not on beta blocker as the patient is on dobutamine. Not a candidate for ACEI/ARB/ARNI at the current time due to renal function.  - Poor renal function - hold apixaban  - Hold bumetanide and spironolactone due to DERRICK  - Continue amiodarone 200 mg daily  - Continue aspirin 81 mg daily  - Renal function improving now  - Continue dobutamine 5 mcg/kg/min  - Chest tubes in place  - Mechanical ventilation  - ICU care  - Overall poor prognosis. Comfort care would be most appropriate.

## 2023-01-06 NOTE — PROGRESS NOTE ADULT - SUBJECTIVE AND OBJECTIVE BOX
Date/Time Patient Seen:  		  Referring MD:   Data Reviewed	       Patient is a 62y old  Male who presents with a chief complaint of resp failure (06 Jan 2023 10:16)      Subjective/HPI     PAST MEDICAL & SURGICAL HISTORY:  Heart failure, systolic    CAD (coronary artery disease)    Hypertension    Nonischemic cardiomyopathy    COPD, moderate    2019 novel coronavirus disease (COVID-19)    Substance abuse    HLD (hyperlipidemia)    Cardiac LV ejection fraction 10-20%    No significant past surgical history    AICD (automatic cardioverter/defibrillator) present    Cardiac LV ejection fraction 10-20%          Medication list         MEDICATIONS  (STANDING):  albuterol    90 MICROgram(s) HFA Inhaler 2 Puff(s) Inhalation every 6 hours  albuterol/ipratropium for Nebulization. 3 milliLiter(s) Nebulizer once  aMIOdarone    Tablet 200 milliGRAM(s) Oral daily  aspirin  chewable 81 milliGRAM(s) Oral daily  chlorhexidine 0.12% Liquid 15 milliLiter(s) Oral Mucosa every 12 hours  dexMEDEtomidine Infusion 0.2 MICROgram(s)/kG/Hr (4.08 mL/Hr) IV Continuous <Continuous>  dextrose 5%. 1000 milliLiter(s) (100 mL/Hr) IV Continuous <Continuous>  dextrose 50% Injectable 25 Gram(s) IV Push once  dextrose 50% Injectable 12.5 Gram(s) IV Push once  dextrose 50% Injectable 25 Gram(s) IV Push once  DOBUTamine Infusion 5 MICROgram(s)/kG/Min (12.2 mL/Hr) IV Continuous <Continuous>  glucagon  Injectable 1 milliGRAM(s) IntraMuscular once  insulin lispro (ADMELOG) corrective regimen sliding scale   SubCutaneous every 6 hours  ipratropium 17 MICROgram(s) HFA Inhaler 1 Puff(s) Inhalation every 6 hours  meropenem  IVPB 500 milliGRAM(s) IV Intermittent every 12 hours  pantoprazole  Injectable 40 milliGRAM(s) IV Push daily  propofol Infusion 10 MICROgram(s)/kG/Min (4.9 mL/Hr) IV Continuous <Continuous>    MEDICATIONS  (PRN):  dextrose Oral Gel 15 Gram(s) Oral once PRN Blood Glucose LESS THAN 70 milliGRAM(s)/deciliter  fentaNYL    Injectable 25 MICROGram(s) IV Push every 2 hours PRN pain or vent synchrony  midazolam Injectable 2 milliGRAM(s) IV Push every 2 hours PRN Agitation         Vitals log        ICU Vital Signs Last 24 Hrs  T(C): 36.1 (06 Jan 2023 08:26), Max: 36.7 (05 Jan 2023 12:32)  T(F): 97 (06 Jan 2023 08:26), Max: 98.1 (05 Jan 2023 20:10)  HR: 82 (06 Jan 2023 10:00) (79 - 123)  BP: 133/79 (06 Jan 2023 10:00) (130/90 - 150/88)  BP(mean): 95 (06 Jan 2023 10:00) (95 - 112)  ABP: --  ABP(mean): --  RR: 14 (06 Jan 2023 10:00) (14 - 35)  SpO2: 97% (06 Jan 2023 10:00) (91% - 99%)    O2 Parameters below as of 06 Jan 2023 07:44  Patient On (Oxygen Delivery Method): ventilator,40             Mode: AC/ CMV (Assist Control/ Continuous Mandatory Ventilation)  RR (machine): 20  TV (machine): 400  FiO2: 40  PEEP: 5  ITime: 1  MAP: 11  PIP: 22      Input and Output:  I&O's Detail    05 Jan 2023 07:01  -  06 Jan 2023 07:00  --------------------------------------------------------  IN:    Dexmedetomidine: 35.7 mL    Dexmedetomidine: 290.1 mL    DOBUTamine: 353.8 mL    Nepro with Carb Steady: 520 mL    Propofol: 85.3 mL  Total IN: 1284.9 mL    OUT:    Chest Tube (mL): 10 mL    Chest Tube (mL): 55 mL    Indwelling Catheter - Urethral (mL): 1605 mL  Total OUT: 1670 mL    Total NET: -385.1 mL      06 Jan 2023 07:01  -  06 Jan 2023 10:44  --------------------------------------------------------  IN:    DOBUTamine: 24.4 mL    Propofol: 29.4 mL  Total IN: 53.8 mL    OUT:    Dexmedetomidine: 0 mL  Total OUT: 0 mL    Total NET: 53.8 mL          Lab Data                        10.9   19.58 )-----------( 218      ( 06 Jan 2023 06:01 )             32.2     01-06    139  |  104  |  110<H>  ----------------------------<  121<H>  3.7   |  18<L>  |  4.86<H>    Ca    8.9      06 Jan 2023 06:01  Phos  5.8     01-06  Mg     2.8     01-06    TPro  6.6  /  Alb  2.1<L>  /  TBili  0.8  /  DBili  x   /  AST  39  /  ALT  91<H>  /  AlkPhos  270<H>  01-06    ABG - ( 06 Jan 2023 06:41 )  pH, Arterial: 7.39  pH, Blood: x     /  pCO2: 28    /  pO2: 65    / HCO3: 17    / Base Excess: -8.1  /  SaO2: 96.1                    Review of Systems	      Objective     Physical Examination    heart s1s2  lung dec BS  head nc      Pertinent Lab findings & Imaging      Leo:  NO   Adequate UO     I&O's Detail    05 Jan 2023 07:01  -  06 Jan 2023 07:00  --------------------------------------------------------  IN:    Dexmedetomidine: 35.7 mL    Dexmedetomidine: 290.1 mL    DOBUTamine: 353.8 mL    Nepro with Carb Steady: 520 mL    Propofol: 85.3 mL  Total IN: 1284.9 mL    OUT:    Chest Tube (mL): 10 mL    Chest Tube (mL): 55 mL    Indwelling Catheter - Urethral (mL): 1605 mL  Total OUT: 1670 mL    Total NET: -385.1 mL      06 Jan 2023 07:01  -  06 Jan 2023 10:44  --------------------------------------------------------  IN:    DOBUTamine: 24.4 mL    Propofol: 29.4 mL  Total IN: 53.8 mL    OUT:    Dexmedetomidine: 0 mL  Total OUT: 0 mL    Total NET: 53.8 mL               Discussed with:     Cultures:	        Radiology

## 2023-01-06 NOTE — PROGRESS NOTE ADULT - ASSESSMENT
REVIEW OF SYMPTOMS      Able to give (reliable) ROS  NO     PHYSICAL EXAM    HEENT Unremarkable  atraumatic   RESP Fair air entry EXP prolonged    Harsh breath sound Resp distres mild   CARDIAC S1 S2 No S3     NO JVD    ABDOMEN SOFT BS PRESENT NOT DISTENDED No hepatosplenomegaly   PEDAL EDEMA present No calf tenderness  NO rash       GENERAL DATA .   GOC.   12/23/2022 full code  ALLGY.     nka                  WT.     12/24/2022 81           BMI.       12/24/2022 26          ICU STAY. .. 12/23/2022  COVID. ..  12/23/2022 scv2 (-)   BEST PRACTICE ISSUES.    HOB ELEVATN. Yes  DVT PPLX. ..    12/23/2022 hpsc   WHITMORE PPLX. ..    12/23/2022 protonix 40   INFN PPLX. ..  12/23/2022 chlorhexidine .12%   SP SW VIVIAN.         DIET.  .. 12/26 nepro 960 ng    IV fl...  FREE WATER 1/4 .2   PROCEDURES...   12/23/2022  l chest tube   12/23/2022 intubated   12/23/2022 reed       ABGS.  1/6/2023 vent 30% 739/28/65   1/3/2023 30% vent 731/32/70   12/25/2022 ac 40% 743/44/108   12/24/2022 ac 60% 746/41/190   12/23/2022 11 a 100% 705/95/68     VS/ PO/IO/ VENT/ DRIPS.   1/6/2023 afeb 80 120/70   1/6/2023 7p dobu 5 m/k/m   1/6/2023 9p prop 50 m/k/m   1/6/2023 ac 20/400/5/.4     PREV ADMISSION 2/11-2/25/2022 [facility] P    AGE doa cc.  62 m doa 12/23/2022 resp distress    PMH  PMH COPD  PMH COVID (+) 2/11/2022   PMH S aureus bacteremia mssa 2/11   .. Ancef 2/12/2022 Dr Phan  PMH AICD  PMH HFREF  .. ECHO 11/20/2021 ef 20%  PMH A fib (on eliquis)     OVERALL PLAN  VENT   .. bundle dsv dsbt ltvv pplat 30 (-) PO2 60 (+) ph 7.3 (+)  .. lung protective ventilatn   .. wean as told if fails then trach  PROLONGED INTUBATN.  .. Plan trach vs GOC determination   SEDATION.  .. dexm 12/30 -->   .. target rass 0 to (-) 1   HEMOPTYSIS   .. 1/3 given ddavp 25  .. 5 d meropenem started 1/3   .. 1/5/2023 no further hemoptysis   INFECTION.  .. 1/6/2023 Has leukocytosis   .. 1/3 meroipenem restarted by ID as leukocytosis   DERRICK   .. HD if decided by renal   .. Creat improving   CHEST TUBE   .. 2nd chest tube placed on 1/3 because of worsening pntx   .. to be removed after trach or extubation which ever comes first   CHF.  .. On dobu started 1/3-->       PROBLEMS ASSESSMENT RECOMMENDATIONS.  Intubation 12/23/2022  Vent mgmnt.   .. bundle dsv dsbt ltvv pplat 30 (-) PO2 60 (+) ph 7.3 (+)  .. lung protective ventilatn   sedation.  .. 12/23/2022 fentanyl 100.4p   .. 12/30 dexmedetomid 400 in 100   .. 1/3/2023 midazolam 2.12p   Weaning.  .. 12/28-1/2/2023  failed cpap   .. 1/2/2023 suggest trach and will continue weaning after trach  .. goc determination being done   COPD.  .. 12/23/2022 combivent .4     .. 12/23/2022 solumed 40.3 -> 12/24/2022 solumed 40   .. 12/23/2022 will taper to 40 on 12/24   .. cont rx  Hemoptysis 1/3/2023   .. 1/3/2023 brb 1 tblsp   .. 1/4/2023 hemoptysis stopped so possibly was sec local trauma   Code 12/23/2022    .. 12/23/2022 was  restless when he came in No defib  .. 12/23/2022 coded at 12.13 p pea arrest about 25 min rosc   .. 12/23/2022 monitor neuro recovery  .. 12/24/2022 eyes open gag (+) does not follow commands   Pneumothorax.  .. 12/23/2022 cxr tension pntx  .. 12/29/2022 ct ch unchanged small loculatd l pntx   decr abd gas and sc emphysema   .. 1/1/2023 plan is to remove ct after trach or after weaning off vent   .. 1/3 needed second chest tube   Infection.   Possible aspn pneum 12/23/2022  Possible uti 12/23/2022   .. w 1/6/2023 w 19    .. flu ab 12/23/2022 (-)  .. rsv 12/23/2022 (-)   .. bc 12/23 (-)   .. mrsa 12/23 (+)   .. 12/24 et pseudo  .. 12/23 uc klebsiella   .. 12/23-1/1 zosyn completed  .. 1/3 meroipenem restarted by ID as leukocytosis   Hemoptysis 1/3   .. ro infection  .. 1/3 empiric meropenem started  .. 1/4/2023 will give 5 d course     CAD.  .. Tr 12/24-12/25/2022 Tr 259 -139  .. 12/23/2022 asa 81   .. monitor   Arrhythmia.  .. 12/30 amiodarone 200  CHF.  .. bnp 12/25-12/27/2022 70k - 51k   .. ECHO 11/20/2021 ef 20%  .. chf meds to be reintroduced by cardio as allowed by bp  .. 1/3/2023 2p dobu 500 in 250 5 m/k/m Dr KWAN)    elevated lfts.  .. LFTS 12/23-12/24-12/25-12/26-12/27-12/31/2022       - [phone]- 306- 287-250     - 7324 - 1766- 670- 212-55      - 2066 - 2405 - 1824- 1041-236   .. 12/23/2022  ct cap pneumoretroperitoneum cw barotrauma   .. 12/23/2022 check hep profile   .. 12/23/2022 follow serially  .. elevcated lfts likely sec to anoxic hepatopathy during cac   DERRICK.  .. Cr 1/2/2023 Cr 5.1  12/23-12/24-12/25-12/26-12/27-12/28-12/29-12/30-12/31/2022 - 1/3-1/4-1/6/2023   Cr 1.7- 3.1- 4 - 4.5 - 4.2-4 - 3.3- 3.8 - 3.8 - 5.7-6.3- 4.8  .. uo 12/25-12/26/2022 400  - 500    .. fluids and serial monitoring  .. 12/25/2022 renal calld   .. 12/26/2022 seen Dr Escalante feels derrick sec ATN recommended d5w  Hyponatremia.  .. Na 1/2-1/4/2023 Na 130- 133     .. 1/2/2023 on ns   .. monitor   AMS.  .. 12/25/2022 remains unresponsive   .. 12/25/2022 Nurse tells me that no sedation is being required   .. 12/23/2022 ct head(-)   .. 12/25/2022 neuro consultd   .. 12/26/2022 pt seen by Dr Hyatt To repeat ct in 72h  .. 12/29/2022 pt is much more awake and is moving l arm    TIME SPENT   Over 39 minutes aggregate critical care time spent on encounter; activities included   direct patient care, counseling and/or coordinating care reviewing notes, lab data/ imaging , discussion with multidisciplinary team/ patient  /family and explaining in detail risks, benefits, alternatives  of the recommendations     BIPIN DE LA CRUZ 61 m 12/23/2022 1962 DR KELVIN VANESSA

## 2023-01-06 NOTE — PROGRESS NOTE ADULT - SUBJECTIVE AND OBJECTIVE BOX
Chief Complaint: Respiratory failure    Interval Events: No events overnight.    Review of Systems:  Unable to obtain    Physical Exam:  Vital Signs Last 24 Hrs  T(C): 36.1 (06 Jan 2023 08:26), Max: 36.7 (05 Jan 2023 12:32)  T(F): 97 (06 Jan 2023 08:26), Max: 98.1 (05 Jan 2023 20:10)  HR: 82 (06 Jan 2023 10:00) (79 - 123)  BP: 133/79 (06 Jan 2023 10:00) (130/90 - 150/88)  BP(mean): 95 (06 Jan 2023 10:00) (95 - 112)  RR: 14 (06 Jan 2023 10:00) (14 - 35)  SpO2: 97% (06 Jan 2023 10:00) (91% - 99%)  Parameters below as of 06 Jan 2023 07:44  Patient On (Oxygen Delivery Method): ventilator,40  General: Sedated  HEENT: Intubated  Neck: No JVD, no carotid bruit  Lungs: CTAB  CV: RRR, nl S1/S2, no M/R/G  Abdomen: S/NT/ND, +BS  Extremities: No LE edema, no cyanosis  Neuro: AAOx0  Skin: No rash    Labs:    01-06    139  |  104  |  110<H>  ----------------------------<  121<H>  3.7   |  18<L>  |  4.86<H>    Ca    8.9      06 Jan 2023 06:01  Phos  5.8     01-06  Mg     2.8     01-06    TPro  6.6  /  Alb  2.1<L>  /  TBili  0.8  /  DBili  x   /  AST  39  /  ALT  91<H>  /  AlkPhos  270<H>  01-06                        10.9   19.58 )-----------( 218      ( 06 Jan 2023 06:01 )             32.2       ECG/Telemetry: Sinus rhythm

## 2023-01-06 NOTE — PROGRESS NOTE ADULT - SUBJECTIVE AND OBJECTIVE BOX
[facility] VOSS    [facility]    Allergies    No Known Allergies    Intolerances    pork (Other)      PAST MEDICAL & SURGICAL HISTORY:  Heart failure, systolic      CAD (coronary artery disease)      Hypertension      Nonischemic cardiomyopathy      COPD, moderate      2019 novel coronavirus disease (COVID-19)      Substance abuse      HLD (hyperlipidemia)      Cardiac LV ejection fraction 10-20%      AICD (automatic cardioverter/defibrillator) present      Cardiac LV ejection fraction 10-20%          FAMILY HISTORY:  FH: lung cancer        Home Medications:  acetaminophen 325 mg oral tablet: 2 tab(s) orally every 6 hours, As needed, Temp greater or equal to 38C (100.4F), Mild Pain (1 - 3) (23 Dec 2022 10:07)  apixaban 5 mg oral tablet: 1 tab(s) orally every 12 hours (23 Dec 2022 10:07)  Aquaphor Healing topical ointment: Apply topically to affected area once a day (23 Dec 2022 10:07)  ascorbic acid 500 mg oral tablet: 1 tab(s) orally once a day (23 Dec 2022 10:07)  Bacid (LAC) oral capsule: 2 cap(s) orally once a day (23 Dec 2022 10:07)  bumetanide 2 mg oral tablet: 1 tab(s) orally 2 times a day (23 Dec 2022 10:07)  gabapentin 100 mg oral capsule: 1 cap(s) orally 3 times a day (23 Dec 2022 10:07)  melatonin 3 mg oral tablet: 1 tab(s) orally once a day (at bedtime), As needed, Insomnia (23 Dec 2022 10:07)  Multiple Vitamins with Minerals oral tablet: 1 tab(s) orally once a day (23 Dec 2022 10:07)  pantoprazole 40 mg oral delayed release tablet: 1 tab(s) orally once a day (before a meal) (23 Dec 2022 10:07)  sacubitril-valsartan 24 mg-26 mg oral tablet: 1 tab(s) orally 2 times a day (23 Dec 2022 10:07)  simethicone 80 mg oral tablet: 2 tab(s) orally every 6 hours, As Needed (23 Dec 2022 10:07)  spironolactone 25 mg oral tablet: 1 tab(s) orally once a day (23 Dec 2022 10:07)  Symbicort 160 mcg-4.5 mcg/inh inhalation aerosol: 2 puff(s) inhaled 2 times a day (23 Dec 2022 10:07)  Ventolin HFA 90 mcg/inh inhalation aerosol: 2 puff(s) inhaled every 6 hours, As Needed (23 Dec 2022 10:07)      MEDICATIONS  (STANDING):  albuterol    90 MICROgram(s) HFA Inhaler 2 Puff(s) Inhalation every 6 hours  albuterol/ipratropium for Nebulization. 3 milliLiter(s) Nebulizer once  aMIOdarone    Tablet 200 milliGRAM(s) Oral daily  aspirin  chewable 81 milliGRAM(s) Oral daily  chlorhexidine 0.12% Liquid 15 milliLiter(s) Oral Mucosa every 12 hours  dexMEDEtomidine Infusion 0.2 MICROgram(s)/kG/Hr (4.08 mL/Hr) IV Continuous <Continuous>  dextrose 5%. 1000 milliLiter(s) (100 mL/Hr) IV Continuous <Continuous>  dextrose 50% Injectable 25 Gram(s) IV Push once  dextrose 50% Injectable 12.5 Gram(s) IV Push once  dextrose 50% Injectable 25 Gram(s) IV Push once  DOBUTamine Infusion 5 MICROgram(s)/kG/Min (12.2 mL/Hr) IV Continuous <Continuous>  glucagon  Injectable 1 milliGRAM(s) IntraMuscular once  insulin lispro (ADMELOG) corrective regimen sliding scale   SubCutaneous every 6 hours  ipratropium 17 MICROgram(s) HFA Inhaler 1 Puff(s) Inhalation every 6 hours  meropenem  IVPB 500 milliGRAM(s) IV Intermittent every 12 hours  pantoprazole  Injectable 40 milliGRAM(s) IV Push daily  propofol Infusion 10 MICROgram(s)/kG/Min (4.9 mL/Hr) IV Continuous <Continuous>    MEDICATIONS  (PRN):  dextrose Oral Gel 15 Gram(s) Oral once PRN Blood Glucose LESS THAN 70 milliGRAM(s)/deciliter  fentaNYL    Injectable 25 MICROGram(s) IV Push every 2 hours PRN pain or vent synchrony  midazolam Injectable 2 milliGRAM(s) IV Push every 2 hours PRN Agitation      Diet, NPO with Tube Feed:   Tube Feeding Modality: Nasogastric  Nepro with Carb Steady  Total Volume for 24 Hours (mL): 960  Continuous  Starting Tube Feed Rate mL per Hour: 20  Increase Tube Feed Rate by (mL): 10     Every 4 hours  Until Goal Tube Feed Rate (mL per Hour): 40  Tube Feed Duration (in Hours): 24  Tube Feed Start Time: 13:00  Free Water Flush  Pump   Rate (mL per Hour): 30 (12-26-22 @ 23:12) [Active]          Vital Signs Last 24 Hrs  T(C): 36.1 (06 Jan 2023 08:26), Max: 36.7 (05 Jan 2023 12:32)  T(F): 97 (06 Jan 2023 08:26), Max: 98.1 (05 Jan 2023 20:10)  HR: 82 (06 Jan 2023 07:50) (80 - 123)  BP: 134/88 (06 Jan 2023 07:00) (130/90 - 150/88)  BP(mean): 102 (06 Jan 2023 07:00) (99 - 112)  RR: 16 (06 Jan 2023 07:00) (14 - 35)  SpO2: 98% (06 Jan 2023 07:50) (91% - 99%)    Parameters below as of 06 Jan 2023 07:44  Patient On (Oxygen Delivery Method): ventilator,40          01-05-23 @ 07:01  -  01-06-23 @ 07:00  --------------------------------------------------------  IN: 1258 mL / OUT: 1670 mL / NET: -412 mL        Mode: AC/ CMV (Assist Control/ Continuous Mandatory Ventilation), RR (machine): 20, TV (machine): 400, FiO2: 40, PEEP: 5, ITime: 1, MAP: 11, PIP: 22      LABS:                        10.9   19.58 )-----------( 218      ( 06 Jan 2023 06:01 )             32.2     01-06    139  |  104  |  110<H>  ----------------------------<  121<H>  3.7   |  18<L>  |  4.86<H>    Ca    8.9      06 Jan 2023 06:01  Phos  5.8     01-06  Mg     2.8     01-06    TPro  6.6  /  Alb  2.1<L>  /  TBili  0.8  /  DBili  x   /  AST  39  /  ALT  91<H>  /  AlkPhos  270<H>  01-06          ABG - ( 06 Jan 2023 06:41 )  pH, Arterial: 7.39  pH, Blood: x     /  pCO2: 28    /  pO2: 65    / HCO3: 17    / Base Excess: -8.1  /  SaO2: 96.1                WBC:  WBC Count: 19.58 K/uL (01-06 @ 06:01)  WBC Count: 14.54 K/uL (01-05 @ 06:17)  WBC Count: 14.94 K/uL (01-04 @ 06:43)  WBC Count: 13.61 K/uL (01-03 @ 18:54)  WBC Count: 12.21 K/uL (01-03 @ 06:00)  WBC Count: 11.89 K/uL (01-02 @ 17:00)      MICROBIOLOGY:  RECENT CULTURES:                  Sodium:  Sodium, Serum: 139 mmol/L (01-06 @ 06:01)  Sodium, Serum: 136 mmol/L (01-06 @ 00:47)  Sodium, Serum: 134 mmol/L (01-05 @ 06:17)  Sodium, Serum: 133 mmol/L (01-04 @ 06:44)  Sodium, Serum: 132 mmol/L (01-03 @ 06:00)  Sodium, Serum: 130 mmol/L (01-02 @ 17:00)      4.86 mg/dL 01-06 @ 06:01  4.89 mg/dL 01-06 @ 00:47  5.49 mg/dL 01-05 @ 06:17  6.35 mg/dL 01-04 @ 06:44  5.75 mg/dL 01-03 @ 06:00  5.10 mg/dL 01-02 @ 17:00      Hemoglobin:  Hemoglobin: 10.9 g/dL (01-06 @ 06:01)  Hemoglobin: 10.5 g/dL (01-05 @ 06:17)  Hemoglobin: 11.0 g/dL (01-04 @ 06:43)  Hemoglobin: 11.6 g/dL (01-03 @ 18:54)  Hemoglobin: 11.2 g/dL (01-03 @ 06:00)  Hemoglobin: 13.9 g/dL (01-02 @ 17:00)      Platelets: Platelet Count - Automated: 218 K/uL (01-06 @ 06:01)  Platelet Count - Automated: 180 K/uL (01-05 @ 06:17)  Platelet Count - Automated: 151 K/uL (01-04 @ 06:43)  Platelet Count - Automated: 157 K/uL (01-03 @ 18:54)  Platelet Count - Automated: 141 K/uL (01-03 @ 06:00)  Platelet Count - Automated: 152 K/uL (01-02 @ 17:00)      LIVER FUNCTIONS - ( 06 Jan 2023 06:01 )  Alb: 2.1 g/dL / Pro: 6.6 g/dL / ALK PHOS: 270 U/L / ALT: 91 U/L DA / AST: 39 U/L / GGT: x                 RADIOLOGY & ADDITIONAL STUDIES:      MICROBIOLOGY:  RECENT CULTURES:

## 2023-01-06 NOTE — PROGRESS NOTE ADULT - SUBJECTIVE AND OBJECTIVE BOX
NEPHROLOGY PROGRESS NOTE    CHIEF COMPLAINT:  DERRICK/CKD    HPI:  Urine output picking up and renal function improving on dobutamine,    EXAM:  T(F): 97 (01-06-23 @ 08:26)  HR: 87 (01-06-23 @ 12:45)  BP: 131/86 (01-06-23 @ 12:45)  RR: 16 (01-06-23 @ 12:45)  SpO2: 100% (01-06-23 @ 12:45)    Vented, sedated  Normal respiratory effort, lungs clear bilaterally  Heart RRR with no murmur, +peripheral edema         LABS                             10.9   19.58 )-----------( 218      ( 06 Jan 2023 06:01 )             32.2          01-06    139  |  104  |  110<H>  ----------------------------<  121<H>  3.7   |  18<L>  |  4.86<H>    Ca    8.9      06 Jan 2023 06:01  Phos  5.8     01-06  Mg     2.8     01-06    TPro  6.6  /  Alb  2.1<L>  /  TBili  0.8  /  DBili  x   /  AST  39  /  ALT  91<H>  /  AlkPhos  270<H>  01-06           Impression  1.  DERRICK -- ischemic, septic ATN. Peak Cr 6.35 starting to recover some function on inotrope  2.  Acute respiratory failure and PEA arrest  3.  Large left sided tension pneumothorax s/p chest tube x 2  4.  Severe systolic dysfunction/dilated CM,    PLAN:  Cont to trend Cr, UO  There are no acute dialytic indications nor would dialysis be expected to modify his already poor prognosis

## 2023-01-06 NOTE — PROGRESS NOTE ADULT - ASSESSMENT
62M CAD, Dilated cardiomyopathy, S/p AICD, Afib/flutter, h/o substance abuse, CKD baseline creat approx 1.4 last admitted to [facility], [place] with MSSA bacteremia, and CHF.  Sent from [facility] with acute hypoxic respiratory failure/PEA arrest.  Initial CT Head - No acute pathology.  Repeat CT head 12/29 - : No acute intracranial hemorrhage, mass effect, or shift of the midline structures. Similar-appearing mild chronic white matter microvascular type changes.  Pt seem to follow some commands when not seadated. Moes left hand and foot on command on occasion. No movements seen on right side.  MRI Brain when feasible.  On ASA 81  HbA1c - 6.4 on 12/24  Lipid panel ordered  NIHSS - can't be accessed accurately.  Renal failure. Cr 4.86  Elevated LFTs.  Severe left ventricular systolic dysfunction. EF 10 %  Pt with multi organ failure. Poor overall prognosis.  Pt is DNR appropriate.  D/w Dr. Nolen and the covering nurse.  Would follow.

## 2023-01-06 NOTE — PROGRESS NOTE ADULT - ASSESSMENT
62M CAD, Dilated cardiomyopathy, S/p AICD, Afib/flutter, h/o substance abuse,  presented with acute hypoxic and hypercarbic respiratory failure associated with pneumothorax which lled to cardiac arrest       Acute respiratory failure secondary to tension pneumothorax with leading to cardiac arrest   - continue vent management;  weaning vs. trach .  ENT aware of poss trach.   - continue chest tubes to suction for now.   - s/p Zosyn total 10 days.   - monitor for signs of bleeding    hypernatremia  - resolved    Cardiomyopathy  - current cardiac issues appear to be secondary to pulm issues at this time  - hold eliquis pending possible further procedures and hospital course  - on dobutamine per cardiolgy    DM2  - not on meds at SNF  - FS monitoring with lispro coverage scale while critically ill    DERRICK on CKD  - Likely ATN due to above, possible HF as well  - monitor creatinine,   - avoid nephrotoxic agents  - now oliguric    Prophylactic measure  - DVT proph: heparin SQ has been on hold due to bleeding from ET tube.  will restart tomorrow if no further signs of bleeding  - GI proph: protonix    Ethics consult appreciated.  SW aware to evaluate for next steps in either trach/peg/possible HD vs   comfort care.      62M CAD, Dilated cardiomyopathy, S/p AICD, Afib/flutter, h/o substance abuse,  presented with acute hypoxic and hypercarbic respiratory failure associated with pneumothorax which led to cardiac arrest       Acute respiratory failure secondary to tension pneumothorax with leading to cardiac arrest   - continue vent management;  weaning vs. trach .  ENT aware of poss trach.   - continue chest tubes to suction for now.   - s/p Zosyn total 10 days.     hypernatremia  - resolved    Cardiomyopathy  - current cardiac issues appear to be secondary to pulm issues at this time  - hold eliquis pending possible further procedures and hospital course  - on dobutamine per cardiolgy    DM2  - not on meds at [facility]  - FS monitoring with lispro coverage scale while critically ill    DERRICK on CKD  - Likely ATN/ischemic   - monitor creatinine: slight improved    - avoid nephrotoxic agents  - per renal : no need for dialysis a this point ,    Prophylactic measure  - DVT proph: heparin SQ has been on hold due to bleeding from ET tube.    - GI proph: protonix    Ethics consult appreciated.  SW aware to evaluate for next steps in either trach/peg/possible HD if renal function worsen  vs   comfort care.      61 y/o M CAD, Dilated cardiomyopathy, S/p AICD, Afib/flutter, h/o substance abuse,  presented with acute hypoxic and hypercarbic respiratory failure associated with pneumothorax which led to cardiac arrest       Acute respiratory failure secondary to tension pneumothorax with leading to cardiac arrest   - continue vent management;  weaning vs. trach .  ENT aware of poss trach.   - continue chest tubes to suction for now.   - s/p Zosyn total 10 days.     hypernatremia  - resolved    Cardiomyopathy  - current cardiac issues appear to be secondary to pulm issues at this time  - hold eliquis pending possible further procedures and hospital course  - on dobutamine per cardiolgy    DM2  - not on meds at [facility]  - FS monitoring with lispro coverage scale while critically ill    DERRICK on CKD  - Likely ATN/ischemic   - monitor creatinine: slight improved    - avoid nephrotoxic agents  - per renal : no need for dialysis a this point ,    Prophylactic measure  - DVT proph: heparin SQ has been on hold due to bleeding from ET tube.    - GI proph: protonix      Ethics consult appreciated.  SW aware to evaluate for next steps in either trach/peg/possible HD if renal function worsen  vs   comfort care.

## 2023-01-07 NOTE — PROGRESS NOTE ADULT - SUBJECTIVE AND OBJECTIVE BOX
Date/Time Patient Seen:  		  Referring MD:   Data Reviewed	       Patient is a 62y old  Male who presents with a chief complaint of resp failure (06 Jan 2023 21:28)      Subjective/HPI     PAST MEDICAL & SURGICAL HISTORY:  Heart failure, systolic    CAD (coronary artery disease)    Hypertension    Nonischemic cardiomyopathy    COPD, moderate    2019 novel coronavirus disease (COVID-19)    Substance abuse    HLD (hyperlipidemia)    Cardiac LV ejection fraction 10-20%    No significant past surgical history    AICD (automatic cardioverter/defibrillator) present    Cardiac LV ejection fraction 10-20%          Medication list         MEDICATIONS  (STANDING):  albuterol    90 MICROgram(s) HFA Inhaler 2 Puff(s) Inhalation every 6 hours  albuterol/ipratropium for Nebulization. 3 milliLiter(s) Nebulizer once  aMIOdarone    Tablet 200 milliGRAM(s) Oral daily  aspirin  chewable 81 milliGRAM(s) Oral daily  chlorhexidine 0.12% Liquid 15 milliLiter(s) Oral Mucosa every 12 hours  dextrose 5%. 1000 milliLiter(s) (100 mL/Hr) IV Continuous <Continuous>  dextrose 50% Injectable 25 Gram(s) IV Push once  dextrose 50% Injectable 25 Gram(s) IV Push once  dextrose 50% Injectable 12.5 Gram(s) IV Push once  DOBUTamine Infusion 5 MICROgram(s)/kG/Min (12.2 mL/Hr) IV Continuous <Continuous>  glucagon  Injectable 1 milliGRAM(s) IntraMuscular once  insulin lispro (ADMELOG) corrective regimen sliding scale   SubCutaneous every 6 hours  ipratropium 17 MICROgram(s) HFA Inhaler 1 Puff(s) Inhalation every 6 hours  meropenem  IVPB 500 milliGRAM(s) IV Intermittent every 12 hours  pantoprazole  Injectable 40 milliGRAM(s) IV Push daily  propofol Infusion 10 MICROgram(s)/kG/Min (4.9 mL/Hr) IV Continuous <Continuous>    MEDICATIONS  (PRN):  dextrose Oral Gel 15 Gram(s) Oral once PRN Blood Glucose LESS THAN 70 milliGRAM(s)/deciliter  fentaNYL    Injectable 25 MICROGram(s) IV Push every 2 hours PRN pain or vent synchrony  midazolam Injectable 2 milliGRAM(s) IV Push every 2 hours PRN Agitation         Vitals log        ICU Vital Signs Last 24 Hrs  T(C): 36.7 (07 Jan 2023 00:04), Max: 36.8 (06 Jan 2023 22:00)  T(F): 98 (07 Jan 2023 00:04), Max: 98.2 (06 Jan 2023 22:00)  HR: 97 (07 Jan 2023 07:00) (70 - 127)  BP: 126/80 (07 Jan 2023 07:00) (83/71 - 154/108)  BP(mean): 93 (07 Jan 2023 07:00) (77 - 128)  ABP: --  ABP(mean): --  RR: 16 (07 Jan 2023 07:00) (13 - 26)  SpO2: 99% (07 Jan 2023 07:00) (97% - 100%)    O2 Parameters below as of 07 Jan 2023 07:00  Patient On (Oxygen Delivery Method): ventilator             Mode: AC/ CMV (Assist Control/ Continuous Mandatory Ventilation)  RR (machine): 20  TV (machine): 400  FiO2: 30  PEEP: 5  ITime: 1  MAP: 14  PIP: 34      Input and Output:  I&O's Detail    06 Jan 2023 07:01  -  07 Jan 2023 07:00  --------------------------------------------------------  IN:    DOBUTamine: 463.6 mL    Enteral Tube Flush: 200 mL    IV PiggyBack: 50 mL    Nepro with Carb Steady: 480 mL    Propofol: 651.4 mL  Total IN: 1845 mL    OUT:    Chest Tube (mL): 0 mL    Chest Tube (mL): 10 mL    Dexmedetomidine: 0 mL    Indwelling Catheter - Urethral (mL): 1100 mL    Rectal Tube (mL): 500 mL  Total OUT: 1610 mL    Total NET: 235 mL          Lab Data                        10.9   19.58 )-----------( 218      ( 06 Jan 2023 06:01 )             32.2     01-06    139  |  104  |  110<H>  ----------------------------<  121<H>  3.7   |  18<L>  |  4.86<H>    Ca    8.9      06 Jan 2023 06:01  Phos  5.8     01-06  Mg     2.8     01-06    TPro  6.6  /  Alb  2.1<L>  /  TBili  0.8  /  DBili  x   /  AST  39  /  ALT  91<H>  /  AlkPhos  270<H>  01-06    ABG - ( 06 Jan 2023 06:41 )  pH, Arterial: 7.39  pH, Blood: x     /  pCO2: 28    /  pO2: 65    / HCO3: 17    / Base Excess: -8.1  /  SaO2: 96.1                    Review of Systems	      Objective     Physical Examination    heart s1s2  lung dc BS  head nc      Pertinent Lab findings & Imaging      Leo:  NO   Adequate UO     I&O's Detail    06 Jan 2023 07:01  -  07 Jan 2023 07:00  --------------------------------------------------------  IN:    DOBUTamine: 463.6 mL    Enteral Tube Flush: 200 mL    IV PiggyBack: 50 mL    Nepro with Carb Steady: 480 mL    Propofol: 651.4 mL  Total IN: 1845 mL    OUT:    Chest Tube (mL): 0 mL    Chest Tube (mL): 10 mL    Dexmedetomidine: 0 mL    Indwelling Catheter - Urethral (mL): 1100 mL    Rectal Tube (mL): 500 mL  Total OUT: 1610 mL    Total NET: 235 mL               Discussed with:     Cultures:	        Radiology

## 2023-01-07 NOTE — PROGRESS NOTE ADULT - ASSESSMENT
62M CAD, Dilated cardiomyopathy, S/p AICD, Afib/flutter, h/o substance abuse, CKD baseline creat approx 1.4 last admitted to [facility] with MSSA bacteremia, and CHF.   Sent from [facility] SNF with acute hypoxic respiratory failure. Was initially on BiPAP then became hypoxic.  Anesthesia intubated patient.  On post intubation Xray pneumothorax evident.  Patient with PEA arrest.  Chest tube placed by ED physician.    S/p PEA arrest, AHRF requiring intubation, Aspiration PNA , Loculated pleural effusions  - BCx negative   - Sputum Cx -- Pseudomonas  - UCx -- Klebsiella  - s/p zosyn x10d course completed 1/1/2023  - WBC up trending and hypothermic- restarted on Antibiotic- Meropenem (started 1/3) with plan for 7 days   - Overall prog poor    Thank you for consulting us and involving us in the management of this most interesting and challenging case.  We will follow along in the care of this patient. Please call us at [phone] or text me directly on my cell# at [phone] with any concerns.

## 2023-01-07 NOTE — PROGRESS NOTE ADULT - SUBJECTIVE AND OBJECTIVE BOX
Subjective: maintained on Dobutamine. UO 1650 cc last 24h.   BMP is not done today.       MEDICATIONS  (STANDING):  albuterol    90 MICROgram(s) HFA Inhaler 2 Puff(s) Inhalation every 6 hours  albuterol/ipratropium for Nebulization. 3 milliLiter(s) Nebulizer once  aMIOdarone    Tablet 200 milliGRAM(s) Oral daily  aspirin  chewable 81 milliGRAM(s) Oral daily  chlorhexidine 0.12% Liquid 15 milliLiter(s) Oral Mucosa every 12 hours  dextrose 5%. 1000 milliLiter(s) (100 mL/Hr) IV Continuous <Continuous>  dextrose 50% Injectable 25 Gram(s) IV Push once  dextrose 50% Injectable 12.5 Gram(s) IV Push once  dextrose 50% Injectable 25 Gram(s) IV Push once  DOBUTamine Infusion 5 MICROgram(s)/kG/Min (12.2 mL/Hr) IV Continuous <Continuous>  glucagon  Injectable 1 milliGRAM(s) IntraMuscular once  insulin lispro (ADMELOG) corrective regimen sliding scale   SubCutaneous every 6 hours  ipratropium 17 MICROgram(s) HFA Inhaler 1 Puff(s) Inhalation every 6 hours  meropenem  IVPB 500 milliGRAM(s) IV Intermittent every 12 hours  pantoprazole  Injectable 40 milliGRAM(s) IV Push daily  propofol Infusion 10 MICROgram(s)/kG/Min (4.9 mL/Hr) IV Continuous <Continuous>    MEDICATIONS  (PRN):  dextrose Oral Gel 15 Gram(s) Oral once PRN Blood Glucose LESS THAN 70 milliGRAM(s)/deciliter  fentaNYL    Injectable 25 MICROGram(s) IV Push every 2 hours PRN pain or vent synchrony  midazolam Injectable 2 milliGRAM(s) IV Push every 2 hours PRN Agitation          T(C): 37 (01-07-23 @ 07:57), Max: 37 (01-07-23 @ 07:57)  HR: 98 (01-07-23 @ 10:00) (70 - 127)  BP: 119/77 (01-07-23 @ 10:00) (83/71 - 154/108)  RR: 16 (01-07-23 @ 10:00) (13 - 26)  SpO2: 97% (01-07-23 @ 10:00) (97% - 100%)  Wt(kg): --    ABG - ( 06 Jan 2023 06:41 )  pH, Arterial: 7.39  pH, Blood: x     /  pCO2: 28    /  pO2: 65    / HCO3: 17    / Base Excess: -8.1  /  SaO2: 96.1                I&O's Detail    06 Jan 2023 07:01  -  07 Jan 2023 07:00  --------------------------------------------------------  IN:    DOBUTamine: 463.6 mL    Enteral Tube Flush: 200 mL    IV PiggyBack: 50 mL    Nepro with Carb Steady: 480 mL    Propofol: 651.4 mL  Total IN: 1845 mL    OUT:    Chest Tube (mL): 0 mL    Chest Tube (mL): 10 mL    Dexmedetomidine: 0 mL    Indwelling Catheter - Urethral (mL): 1100 mL    Rectal Tube (mL): 500 mL  Total OUT: 1610 mL    Total NET: 235 mL          Mode: CPAP with PS  FiO2: 30  PEEP: 5  PS: 10  ITime: 1  MAP: 10  PIP: 15       PHYSICAL EXAM:    GENERAL: intubated.   CHEST/LUNG: dec BS on L, CTs X2  HEART: S1S2  ABDOMEN: Soft  EXTREMITIES:  pos edema      LABS:  CBC Full  -  ( 06 Jan 2023 06:01 )  WBC Count : 19.58 K/uL  RBC Count : 3.56 M/uL  Hemoglobin : 10.9 g/dL  Hematocrit : 32.2 %  Platelet Count - Automated : 218 K/uL  Mean Cell Volume : 90.4 fl  Mean Cell Hemoglobin : 30.6 pg  Mean Cell Hemoglobin Concentration : 33.9 gm/dL  Auto Neutrophil # : 17.04 K/uL  Auto Lymphocyte # : 0.62 K/uL  Auto Monocyte # : 1.53 K/uL  Auto Eosinophil # : 0.07 K/uL  Auto Basophil # : 0.03 K/uL  Auto Neutrophil % : 86.9 %  Auto Lymphocyte % : 3.2 %  Auto Monocyte % : 7.8 %  Auto Eosinophil % : 0.4 %  Auto Basophil % : 0.2 %    01-06    139  |  104  |  110<H>  ----------------------------<  121<H>  3.7   |  18<L>  |  4.86<H>    Ca    8.9      06 Jan 2023 06:01  Phos  5.8     01-06  Mg     2.8     01-06    TPro  6.6  /  Alb  2.1<L>  /  TBili  0.8  /  DBili  x   /  AST  39  /  ALT  91<H>  /  AlkPhos  270<H>  01-06          ASSESSMENT:  1.  DERRICK -- ischemic, septic ATN. Peak Cr 6.35. Improving renal indices, UO.   2.  Acute respiratory failure and PEA arrest  3.  Large left sided tension pneumothorax s/p chest tube x 2  4.  Hypernatremia -> hyponatremia, hypervolemic  5.  Trending hypotension - probably cardiogenic in nature given hx of pre existing severe systolic dysfunction and dilated CM    PLAN:  Cont to trend Cr, UO  No immediate dialytic indications  Follow BMP daily.

## 2023-01-07 NOTE — PROGRESS NOTE ADULT - SUBJECTIVE AND OBJECTIVE BOX
Patient is a 62y old  Male who presents with a chief complaint of resp failure (26 Dec 2022 09:47)    HPI: 62M CAD, Dilated cardiomyopathy, S/p AICD, Afib/flutter, h/o substance abuse, CKD baseline creat approx 1.4 last admitted to [facility] with MSSA bacteremia, and CHF.  Sent from [facility] SNF with acute hypoxic respiratory failure.  Was initially on BiPAP then became hypoxic.  Anesthesia intubated patient.  On post intubation Xray pneumothorax evident.  Patient with PEA arrest.  Chest tube placed by ED physician.  ROSC obtained.     Patient unable to give further history.      Intetrval history -     Intubated on vent.   Responsive to stimuli    MEDICATIONS  (STANDING):    albuterol    90 MICROgram(s) HFA Inhaler 2 Puff(s) Inhalation every 6 hours  albuterol/ipratropium for Nebulization. 3 milliLiter(s) Nebulizer once  aMIOdarone    Tablet 200 milliGRAM(s) Oral daily  aspirin  chewable 81 milliGRAM(s) Oral daily  chlorhexidine 0.12% Liquid 15 milliLiter(s) Oral Mucosa every 12 hours  dextrose 5%. 1000 milliLiter(s) (100 mL/Hr) IV Continuous <Continuous>  dextrose 50% Injectable 25 Gram(s) IV Push once  dextrose 50% Injectable 12.5 Gram(s) IV Push once  dextrose 50% Injectable 25 Gram(s) IV Push once  DOBUTamine Infusion 5 MICROgram(s)/kG/Min (12.2 mL/Hr) IV Continuous <Continuous>  glucagon  Injectable 1 milliGRAM(s) IntraMuscular once  insulin lispro (ADMELOG) corrective regimen sliding scale   SubCutaneous every 6 hours  ipratropium 17 MICROgram(s) HFA Inhaler 1 Puff(s) Inhalation every 6 hours  meropenem  IVPB 500 milliGRAM(s) IV Intermittent every 12 hours  pantoprazole  Injectable 40 milliGRAM(s) IV Push daily  propofol Infusion 10 MICROgram(s)/kG/Min (4.9 mL/Hr) IV Continuous <Continuous>    MEDICATIONS  (PRN):    dextrose Oral Gel 15 Gram(s) Oral once PRN Blood Glucose LESS THAN 70 milliGRAM(s)/deciliter  fentaNYL    Injectable 25 MICROGram(s) IV Push every 2 hours PRN pain or vent synchrony  midazolam Injectable 2 milliGRAM(s) IV Push every 2 hours PRN Agitation    Allergies    No Known Allergies    Intolerances    pork (Other)    REVIEW OF SYSTEMS: UTO    PHYSICAL EXAM:    Vital Signs Last 24 Hrs    T(C): 36.7 (05 Jan 2023 16:02), Max: 37 (05 Jan 2023 04:06)  T(F): 98 (05 Jan 2023 16:02), Max: 98.6 (05 Jan 2023 04:06)  HR: 86 (05 Jan 2023 18:28) (79 - 123)  BP: 140/84 (05 Jan 2023 18:00) (132/83 - 146/94)  BP(mean): 99 (05 Jan 2023 18:00) (97 - 112)  RR: 28 (05 Jan 2023 18:00) (13 - 29)  SpO2: 96% (05 Jan 2023 18:28) (94% - 100%)    Parameters below as of 05 Jan 2023 13:36  Patient On (Oxygen Delivery Method): ventilator,30    On Neurological Examination:    Intubated on vent.  On sedation.  Pupils are 3 mm, sluggishly reacting to light.  Neck is supple.  No abn body movements.    LABS:    CBC Full  -  ( 06 Jan 2023 06:01 )  WBC Count : 19.58 K/uL  RBC Count : 3.56 M/uL  Hemoglobin : 10.9 g/dL  Hematocrit : 32.2 %  Platelet Count - Automated : 218 K/uL  Mean Cell Volume : 90.4 fl  Mean Cell Hemoglobin : 30.6 pg  Mean Cell Hemoglobin Concentration : 33.9 gm/dL  Auto Neutrophil # : 17.04 K/uL  Auto Lymphocyte # : 0.62 K/uL  Auto Monocyte # : 1.53 K/uL  Auto Eosinophil # : 0.07 K/uL  Auto Basophil # : 0.03 K/uL  Auto Neutrophil % : 86.9 %  Auto Lymphocyte % : 3.2 %  Auto Monocyte % : 7.8 %  Auto Eosinophil % : 0.4 %  Auto Basophil % : 0.2 %    01-06    139  |  104  |  110<H>  ----------------------------<  121<H>  3.7   |  18<L>  |  4.86<H>    Ca    8.9      06 Jan 2023 06:01  Phos  5.8     01-06  Mg     2.8     01-06    TPro  6.6  /  Alb  2.1<L>  /  TBili  0.8  /  DBili  x   /  AST  39  /  ALT  91<H>  /  AlkPhos  270<H>  01-06    RADIOLOGY & ADDITIONAL STUDIES:    < from: TTE Echo Complete w/o Contrast w/ Doppler (02.13.22 @ 09:00) >    1. Severe left ventricular systolic dysfunction. EF 10 %  2. Dilated left ventricle, left atrium, right atrium.  3. Moderate mitral regurgitation.  4. Moderate tricuspid regurgitation.  5. Mild pulmonary hypertension.  6. No evidence of endocarditis on this transthoracic study.    < end of copied text >    r< from: CT Head No Cont (12.29.22 @ 13:20) >    IMPRESSION: No acute intracranial hemorrhage, mass effect, or shift of the midline structures.    Similar-appearing mild chronic white matter microvascular type changes.    < end of copied text >    < from: CT Head No Cont (12.23.22 @ 21:18) >    IMPRESSION:    No large territory acute infarct, intracranial hemorrhage, or mass effect.    Slight progression of chronic microangiopathic white matter changes as compared to prior study from 2016.    < end of copied text >    < from: CT Chest No Cont (12.23.22 @ 21:19) >    IMPRESSION:    *  Left chest tube with residual small left pneumothorax. No evidence of tension.  *  Right basilar rounded atelectasis again noted with chronic small loculated right pleural effusion.  *  Small pneumomediastinum. Left chest and abdominal wall emphysema. Moderate pneumoretroperitoneum and properitoneal air. No pneumoperitoneum. Findings are compatible with barotrauma.  *  Evidence of urinary bladder cystitis.    < end of copied text >

## 2023-01-07 NOTE — PROGRESS NOTE ADULT - SUBJECTIVE AND OBJECTIVE BOX
DOROTHY VOSS    [facility]    Allergies    No Known Allergies    Intolerances    pork (Other)      PAST MEDICAL & SURGICAL HISTORY:  Heart failure, systolic      CAD (coronary artery disease)      Hypertension      Nonischemic cardiomyopathy      COPD, moderate      2019 novel coronavirus disease (COVID-19)      Substance abuse      HLD (hyperlipidemia)      Cardiac LV ejection fraction 10-20%      AICD (automatic cardioverter/defibrillator) present      Cardiac LV ejection fraction 10-20%          FAMILY HISTORY:  FH: lung cancer        Home Medications:  acetaminophen 325 mg oral tablet: 2 tab(s) orally every 6 hours, As needed, Temp greater or equal to 38C (100.4F), Mild Pain (1 - 3) (23 Dec 2022 10:07)  apixaban 5 mg oral tablet: 1 tab(s) orally every 12 hours (23 Dec 2022 10:07)  Aquaphor Healing topical ointment: Apply topically to affected area once a day (23 Dec 2022 10:07)  ascorbic acid 500 mg oral tablet: 1 tab(s) orally once a day (23 Dec 2022 10:07)  Bacid (LAC) oral capsule: 2 cap(s) orally once a day (23 Dec 2022 10:07)  bumetanide 2 mg oral tablet: 1 tab(s) orally 2 times a day (23 Dec 2022 10:07)  gabapentin 100 mg oral capsule: 1 cap(s) orally 3 times a day (23 Dec 2022 10:07)  melatonin 3 mg oral tablet: 1 tab(s) orally once a day (at bedtime), As needed, Insomnia (23 Dec 2022 10:07)  Multiple Vitamins with Minerals oral tablet: 1 tab(s) orally once a day (23 Dec 2022 10:07)  pantoprazole 40 mg oral delayed release tablet: 1 tab(s) orally once a day (before a meal) (23 Dec 2022 10:07)  sacubitril-valsartan 24 mg-26 mg oral tablet: 1 tab(s) orally 2 times a day (23 Dec 2022 10:07)  simethicone 80 mg oral tablet: 2 tab(s) orally every 6 hours, As Needed (23 Dec 2022 10:07)  spironolactone 25 mg oral tablet: 1 tab(s) orally once a day (23 Dec 2022 10:07)  Symbicort 160 mcg-4.5 mcg/inh inhalation aerosol: 2 puff(s) inhaled 2 times a day (23 Dec 2022 10:07)  Ventolin HFA 90 mcg/inh inhalation aerosol: 2 puff(s) inhaled every 6 hours, As Needed (23 Dec 2022 10:07)      MEDICATIONS  (STANDING):  albuterol    90 MICROgram(s) HFA Inhaler 2 Puff(s) Inhalation every 6 hours  albuterol/ipratropium for Nebulization. 3 milliLiter(s) Nebulizer once  aMIOdarone    Tablet 200 milliGRAM(s) Oral daily  aspirin  chewable 81 milliGRAM(s) Oral daily  chlorhexidine 0.12% Liquid 15 milliLiter(s) Oral Mucosa every 12 hours  dextrose 5%. 1000 milliLiter(s) (100 mL/Hr) IV Continuous <Continuous>  dextrose 50% Injectable 25 Gram(s) IV Push once  dextrose 50% Injectable 25 Gram(s) IV Push once  dextrose 50% Injectable 12.5 Gram(s) IV Push once  DOBUTamine Infusion 5 MICROgram(s)/kG/Min (12.2 mL/Hr) IV Continuous <Continuous>  glucagon  Injectable 1 milliGRAM(s) IntraMuscular once  insulin lispro (ADMELOG) corrective regimen sliding scale   SubCutaneous every 6 hours  ipratropium 17 MICROgram(s) HFA Inhaler 1 Puff(s) Inhalation every 6 hours  meropenem  IVPB 500 milliGRAM(s) IV Intermittent every 12 hours  pantoprazole  Injectable 40 milliGRAM(s) IV Push daily  propofol Infusion 10 MICROgram(s)/kG/Min (4.9 mL/Hr) IV Continuous <Continuous>    MEDICATIONS  (PRN):  dextrose Oral Gel 15 Gram(s) Oral once PRN Blood Glucose LESS THAN 70 milliGRAM(s)/deciliter  fentaNYL    Injectable 25 MICROGram(s) IV Push every 2 hours PRN pain or vent synchrony  midazolam Injectable 2 milliGRAM(s) IV Push every 2 hours PRN Agitation      Diet, NPO with Tube Feed:   Tube Feeding Modality: Nasogastric  Nepro with Carb Steady  Total Volume for 24 Hours (mL): 960  Continuous  Starting Tube Feed Rate mL per Hour: 20  Increase Tube Feed Rate by (mL): 10     Every 4 hours  Until Goal Tube Feed Rate (mL per Hour): 40  Tube Feed Duration (in Hours): 24  Tube Feed Start Time: 13:00  Free Water Flush  Pump   Rate (mL per Hour): 30 (12-26-22 @ 23:12) [Active]          Vital Signs Last 24 Hrs  T(C): 37 (07 Jan 2023 07:57), Max: 37 (07 Jan 2023 07:57)  T(F): 98.6 (07 Jan 2023 07:57), Max: 98.6 (07 Jan 2023 07:57)  HR: 98 (07 Jan 2023 10:00) (70 - 127)  BP: 119/77 (07 Jan 2023 10:00) (83/71 - 154/108)  BP(mean): 91 (07 Jan 2023 10:00) (77 - 128)  RR: 16 (07 Jan 2023 10:00) (13 - 26)  SpO2: 97% (07 Jan 2023 10:00) (97% - 100%)    Parameters below as of 07 Jan 2023 10:00  Patient On (Oxygen Delivery Method): ventilator          01-06-23 @ 07:01  -  01-07-23 @ 07:00  --------------------------------------------------------  IN: 1845 mL / OUT: 1610 mL / NET: 235 mL        Mode: CPAP with PS, FiO2: 30, PEEP: 5, PS: 10, ITime: 1, MAP: 10, PIP: 15      LABS:                        10.9   19.58 )-----------( 218      ( 06 Jan 2023 06:01 )             32.2     01-06    139  |  104  |  110<H>  ----------------------------<  121<H>  3.7   |  18<L>  |  4.86<H>    Ca    8.9      06 Jan 2023 06:01  Phos  5.8     01-06  Mg     2.8     01-06    TPro  6.6  /  Alb  2.1<L>  /  TBili  0.8  /  DBili  x   /  AST  39  /  ALT  91<H>  /  AlkPhos  270<H>  01-06          ABG - ( 06 Jan 2023 06:41 )  pH, Arterial: 7.39  pH, Blood: x     /  pCO2: 28    /  pO2: 65    / HCO3: 17    / Base Excess: -8.1  /  SaO2: 96.1                WBC:  WBC Count: 19.58 K/uL (01-06 @ 06:01)  WBC Count: 14.54 K/uL (01-05 @ 06:17)  WBC Count: 14.94 K/uL (01-04 @ 06:43)  WBC Count: 13.61 K/uL (01-03 @ 18:54)      MICROBIOLOGY:  RECENT CULTURES:                  Sodium:  Sodium, Serum: 139 mmol/L (01-06 @ 06:01)  Sodium, Serum: 136 mmol/L (01-06 @ 00:47)  Sodium, Serum: 134 mmol/L (01-05 @ 06:17)  Sodium, Serum: 133 mmol/L (01-04 @ 06:44)      4.86 mg/dL 01-06 @ 06:01  4.89 mg/dL 01-06 @ 00:47  5.49 mg/dL 01-05 @ 06:17  6.35 mg/dL 01-04 @ 06:44      Hemoglobin:  Hemoglobin: 10.9 g/dL (01-06 @ 06:01)  Hemoglobin: 10.5 g/dL (01-05 @ 06:17)  Hemoglobin: 11.0 g/dL (01-04 @ 06:43)  Hemoglobin: 11.6 g/dL (01-03 @ 18:54)      Platelets: Platelet Count - Automated: 218 K/uL (01-06 @ 06:01)  Platelet Count - Automated: 180 K/uL (01-05 @ 06:17)  Platelet Count - Automated: 151 K/uL (01-04 @ 06:43)  Platelet Count - Automated: 157 K/uL (01-03 @ 18:54)      LIVER FUNCTIONS - ( 06 Jan 2023 06:01 )  Alb: 2.1 g/dL / Pro: 6.6 g/dL / ALK PHOS: 270 U/L / ALT: 91 U/L DA / AST: 39 U/L / GGT: x                 RADIOLOGY & ADDITIONAL STUDIES:      MICROBIOLOGY:  RECENT CULTURES:

## 2023-01-07 NOTE — PROGRESS NOTE ADULT - ASSESSMENT
63 yo m pmhx dilated cardiomyopathy s/p AICD, CKD, HTN, afib/flutter, opiod/substance abuse presented 12/23 with sob/ams, failed NIPPV requiring intubation complicated by L-PTX and subsequent PEA cardiac arrest s/p CT admitted to [facility] with hospital course complicated by asp pna, shock, DERRICK on CKD and recurrent L ptx requiring emergent pigtail placement on 1/3.     NEURO: Sedated on propofol  CV: Dobutamine for inotropic support to promote diuresis, weaned 2/2 vt.  repeat labs ordered.   RESP: Hypoxic respiratory failure, ac/vc 4-6cc/kg tv lung protective strategies, pulm lavage/suctioning. inh bronchodilators prn. ct to suction  RENAL: DERRICK on CKD, avoid nephrotoxic meds, renally dose meds, trend urine output, bun/cr and electrolytes.  Diuresing well.  repeat labs ordered   GI: NPO with tf  ENDO: ISS for glycemic control   ID: Meropenem for coverage  HEME: Heparin for vte ppx   DISPO: Full code.      Critical Care time: 40 mins assessing presenting problems of acute illness that poses high probability of life threatening deterioration or end organ damage/dysfunction.  Medical decision making including Initiating plan of care, reviewing data, reviewing radiology, discussing with multidisciplinary team, non inclusive of procedures, discussing goals of care with patient/family

## 2023-01-07 NOTE — PROGRESS NOTE ADULT - ASSESSMENT
63 y/o M CAD, Dilated cardiomyopathy, S/p AICD, Afib/flutter, h/o substance abuse,  presented with acute hypoxic and hypercarbic respiratory failure associated with pneumothorax which led to cardiac arrest       Acute respiratory failure secondary to tension pneumothorax with leading to cardiac arrest   - continue vent management;  weaning vs. trach .  ENT aware of poss trach.   - continue chest tubes to suction for now.   -duoneb inhalation     Aspiration pneumonia /Sputum Cx psudomonous    completed zosyn x10 days , on meropenem now     hypernatremia  - resolved    Cardiomyopathy, Acute systolic CHF   - hold eliquis suggested   - on dobutamine drip per cardiolgy  - amiodarone    DM2  - not on med[facility] at [facility]  - FS monitoring with lispro coverage scale while critically ill    DERRICK on CKD  - Likely ATN/ischemic   - monitor creatinine: slight improved    - avoid nephrotoxic agents  - per renal : no need for dialysis a this point ,    Prophylactic measure  - DVT proph: heparin SQ has been on hold due to bleeding from ET tube.    - GI proph: protonix      Ethics consult appreciated.  SW aware to evaluate for next steps in either trach/peg/possible HD if renal function worsen  vs   comfort care.

## 2023-01-07 NOTE — PROGRESS NOTE ADULT - SUBJECTIVE AND OBJECTIVE BOX
Patient is a 62y Male with a known history of :    HPI:  62M CAD, Dilated cardiomyopathy, S/p AICD, Afib/flutter, h/o substance abuse, CKD baseline creat approx 1.4 last admitted to [facility] with MSSA bacteremia, and CHF.  Sent from [facility] with acute hypoxic respiratory failure.  Was initially on BiPAP then became hypoxic.  Anesthesia intubated patient.  On post intubation Xray pneumothorax evident.  Patient with PEA arrest.  Chest tube placed by ED physician.  ROSC obtained.     Patient unable to give further history.     (23 Dec 2022 13:02)      REVIEW OF SYSTEMS:    CONSTITUTIONAL: No fever, weight loss, or fatigue  EYES: No eye pain, visual disturbances, or discharge  ENMT:  No difficulty hearing, tinnitus, vertigo; No sinus or throat pain  NECK: No pain or stiffness  BREASTS: No pain, masses, or nipple discharge  RESPIRATORY: No cough, wheezing, chills or hemoptysis; No shortness of breath  CARDIOVASCULAR: No chest pain, palpitations, dizziness, or leg swelling  GASTROINTESTINAL: No abdominal or epigastric pain. No nausea, vomiting, or hematemesis; No diarrhea or constipation. No melena or hematochezia.  GENITOURINARY: No dysuria, frequency, hematuria, or incontinence  NEUROLOGICAL: No headaches, memory loss, loss of strength, numbness, or tremors  SKIN: No itching, burning, rashes, or lesions   LYMPH NODES: No enlarged glands  ENDOCRINE: No heat or cold intolerance; No hair loss  MUSCULOSKELETAL: No joint pain or swelling; No muscle, back, or extremity pain  PSYCHIATRIC: No depression, anxiety, mood swings, or difficulty sleeping  HEME/LYMPH: No easy bruising, or bleeding gums  ALLERGY AND IMMUNOLOGIC: No hives or eczema    MEDICATIONS  (STANDING):  albuterol    90 MICROgram(s) HFA Inhaler 2 Puff(s) Inhalation every 6 hours  albuterol/ipratropium for Nebulization. 3 milliLiter(s) Nebulizer once  aMIOdarone    Tablet 200 milliGRAM(s) Oral daily  aspirin  chewable 81 milliGRAM(s) Oral daily  chlorhexidine 0.12% Liquid 15 milliLiter(s) Oral Mucosa every 12 hours  dextrose 5%. 1000 milliLiter(s) (100 mL/Hr) IV Continuous <Continuous>  dextrose 50% Injectable 25 Gram(s) IV Push once  dextrose 50% Injectable 25 Gram(s) IV Push once  dextrose 50% Injectable 12.5 Gram(s) IV Push once  DOBUTamine Infusion 5 MICROgram(s)/kG/Min (12.2 mL/Hr) IV Continuous <Continuous>  glucagon  Injectable 1 milliGRAM(s) IntraMuscular once  insulin lispro (ADMELOG) corrective regimen sliding scale   SubCutaneous every 6 hours  ipratropium 17 MICROgram(s) HFA Inhaler 1 Puff(s) Inhalation every 6 hours  meropenem  IVPB 500 milliGRAM(s) IV Intermittent every 12 hours  pantoprazole  Injectable 40 milliGRAM(s) IV Push daily  propofol Infusion 10 MICROgram(s)/kG/Min (4.9 mL/Hr) IV Continuous <Continuous>    MEDICATIONS  (PRN):  dextrose Oral Gel 15 Gram(s) Oral once PRN Blood Glucose LESS THAN 70 milliGRAM(s)/deciliter  fentaNYL    Injectable 25 MICROGram(s) IV Push every 2 hours PRN pain or vent synchrony  midazolam Injectable 2 milliGRAM(s) IV Push every 2 hours PRN Agitation      ALLERGIES: No Known Allergies      FAMILY HISTORY:  FH: lung cancer        Social history:  Alochol:   Smoking:   Drug Use:   Marital Status:     PHYSICAL EXAMINATION:  -----------------------------  T(C): 37 (01-07-23 @ 07:57), Max: 37 (01-07-23 @ 07:57)  HR: 96 (01-07-23 @ 09:00) (70 - 127)  BP: 116/78 (01-07-23 @ 09:00) (83/71 - 154/108)  RR: 14 (01-07-23 @ 09:00) (13 - 26)  SpO2: 97% (01-07-23 @ 09:00) (97% - 100%)  Wt(kg): --    01-06 @ 07:01  -  01-07 @ 07:00  --------------------------------------------------------  IN:    DOBUTamine: 463.6 mL    Enteral Tube Flush: 200 mL    IV PiggyBack: 50 mL    Nepro with Carb Steady: 480 mL    Propofol: 651.4 mL  Total IN: 1845 mL    OUT:    Chest Tube (mL): 0 mL    Chest Tube (mL): 10 mL    Dexmedetomidine: 0 mL    Indwelling Catheter - Urethral (mL): 1100 mL    Rectal Tube (mL): 500 mL  Total OUT: 1610 mL    Total NET: 235 mL            Constitutional: well developed, normal appearance, well groomed, well nourished, no deformities and no acute distress.   Eyes: the conjunctiva exhibited no abnormalities and the eyelids demonstrated no xanthelasmas.   HEENT: normal oral mucosa, no oral pallor and no oral cyanosis.   Neck: normal jugular venous A waves present, normal jugular venous V waves present and no jugular venous malone A waves.   Pulmonary: no respiratory distress, normal respiratory rhythm and effort, no accessory muscle use and lungs were clear to auscultation bilaterally. Anteriorly  Cardiovascular: heart rate and rhythm were normal, normal S1 and S2 and no murmur, gallop, rub, heave or thrill are present.   Musculoskeletal: the gait could not be assessed.  Extremities: no clubbing of the fingernails, no localized cyanosis, no petechial hemorrhages and no ischemic changes.   Skin: normal skin color and pigmentation, no rash, no venous stasis, no skin lesions, no skin ulcer and no xanthoma was observed.       LABS:   --------  01-06    139  |  104  |  110<H>  ----------------------------<  121<H>  3.7   |  18<L>  |  4.86<H>    Ca    8.9      06 Jan 2023 06:01  Phos  5.8     01-06  Mg     2.8     01-06    TPro  6.6  /  Alb  2.1<L>  /  TBili  0.8  /  DBili  x   /  AST  39  /  ALT  91<H>  /  AlkPhos  270<H>  01-06                         10.9   19.58 )-----------( 218      ( 06 Jan 2023 06:01 )             32.2                   Radiology:

## 2023-01-07 NOTE — PROGRESS NOTE ADULT - SUBJECTIVE AND OBJECTIVE BOX
Patient is a 62y old  Male who presents with a chief complaint of resp failure (06 Jan 2023 21:28)      Subjective:  INTERVAL HPI/OVERNIGHT EVENTS: Patient seen and examined at bedside.  Patient noticed to have cough/increased secretion yesterday, NG tube dislodged after cough.   MEDICATIONS  (STANDING):  albuterol    90 MICROgram(s) HFA Inhaler 2 Puff(s) Inhalation every 6 hours  albuterol/ipratropium for Nebulization. 3 milliLiter(s) Nebulizer once  aMIOdarone    Tablet 200 milliGRAM(s) Oral daily  aspirin  chewable 81 milliGRAM(s) Oral daily  chlorhexidine 0.12% Liquid 15 milliLiter(s) Oral Mucosa every 12 hours  dextrose 5%. 1000 milliLiter(s) (100 mL/Hr) IV Continuous <Continuous>  dextrose 50% Injectable 25 Gram(s) IV Push once  dextrose 50% Injectable 12.5 Gram(s) IV Push once  dextrose 50% Injectable 25 Gram(s) IV Push once  DOBUTamine Infusion 5 MICROgram(s)/kG/Min (12.2 mL/Hr) IV Continuous <Continuous>  glucagon  Injectable 1 milliGRAM(s) IntraMuscular once  insulin lispro (ADMELOG) corrective regimen sliding scale   SubCutaneous every 6 hours  ipratropium 17 MICROgram(s) HFA Inhaler 1 Puff(s) Inhalation every 6 hours  meropenem  IVPB 500 milliGRAM(s) IV Intermittent every 12 hours  pantoprazole  Injectable 40 milliGRAM(s) IV Push daily  propofol Infusion 10 MICROgram(s)/kG/Min (4.9 mL/Hr) IV Continuous <Continuous>    MEDICATIONS  (PRN):  dextrose Oral Gel 15 Gram(s) Oral once PRN Blood Glucose LESS THAN 70 milliGRAM(s)/deciliter  fentaNYL    Injectable 25 MICROGram(s) IV Push every 2 hours PRN pain or vent synchrony  midazolam Injectable 2 milliGRAM(s) IV Push every 2 hours PRN Agitation      Allergies    No Known Allergies    Intolerances    pork (Other)      REVIEW OF SYSTEMS:  not able to obtain due to altered mentation       Objective:  Vital Signs Last 24 Hrs  T(C): 36.7 (07 Jan 2023 00:04), Max: 36.8 (06 Jan 2023 22:00)  T(F): 98 (07 Jan 2023 00:04), Max: 98.2 (06 Jan 2023 22:00)  HR: 97 (07 Jan 2023 07:00) (70 - 127)  BP: 126/80 (07 Jan 2023 07:00) (83/71 - 154/108)  BP(mean): 93 (07 Jan 2023 07:00) (77 - 128)  RR: 16 (07 Jan 2023 07:00) (13 - 26)  SpO2: 99% (07 Jan 2023 07:00) (97% - 100%)    Parameters below as of 07 Jan 2023 07:00  Patient On (Oxygen Delivery Method): ventilator        GENERAL: sedated ,  lying in bed, intubated   HEAD:  Normocephalic  EYES:  conjunctiva and sclera clear  ENT: Moist mucous membranes, NG tube was replaced yesterday   NECK: Supple  CHEST/LUNG: scattered rhonchi, decreased BS B/L LL ; no wheezing. chest tubes in   HEART: Regular rate and rhythm; S1S2+  ABDOMEN: Bowel sounds present; Soft, mild distended.   EXTREMITIES:  decreased distal Peripheral Pulses; trace B/L ankle edema  NERVOUS SYSTEM:  sedated   MSK: functional paraplegia   SKIN: No rashes     LABS:      Ca    8.9        06 Jan 2023 06:01          CAPILLARY BLOOD GLUCOSE      POCT Blood Glucose.: 102 mg/dL (07 Jan 2023 05:19)  POCT Blood Glucose.: 86 mg/dL (06 Jan 2023 23:32)  POCT Blood Glucose.: 110 mg/dL (06 Jan 2023 17:20)  POCT Blood Glucose.: 127 mg/dL (06 Jan 2023 11:42)          RADIOLOGY & ADDITIONAL TESTS:  < from: Xray Chest 1 View- PORTABLE-Urgent (Xray Chest 1 View- PORTABLE-Urgent .) (01.06.23 @ 16:23) >    IMPRESSION: Nosignificant interval change.  Catheters and tubes in place.  Residual moderate RIGHT effusion and/or basilar airspace consolidation.  Cardiomegaly..    < end of copied text >    Personally reviewed.     Consultant(s) Notes Reviewed:  [x] YES  [ ] NO    Plan of care discussed with patient /family; all questions answered

## 2023-01-07 NOTE — PROGRESS NOTE ADULT - SUBJECTIVE AND OBJECTIVE BOX
Patient is a 62y old  Male who presents with a chief complaint of resp failure (2023 13:56)      BRIEF HOSPITAL COURSE:   61 yo m pmhx dilated cardiomyopathy s/p AICD, CKD, HTN, afib/flutter, opiod/substance abuse presented  with sob/ams, failed NIPPV requiring intubation complicated by L-PTX and subsequent PEA cardiac arrest s/p CT admitted to [facility] with hospital course complicated by asp pna, shock, DERRICK on CKD and recurrent L ptx requiring emergent pigtail placement on 1/3.     Events last 24 hours:   Patient on  at 5mcg/kg/min, patient with PVCs this evening and then 28 beat run of vt.  Patient diuresing well,  weaned to 2.5mcg/kg/min, labs ordered.  ct remain to suction. thick clear oral and ett suctioning    PAST MEDICAL & SURGICAL HISTORY:  Heart failure, systolic      CAD (coronary artery disease)      Hypertension      Nonischemic cardiomyopathy      COPD, moderate      2019 novel coronavirus disease (COVID-19)      Substance abuse      HLD (hyperlipidemia)      Cardiac LV ejection fraction 10-20%      AICD (automatic cardioverter/defibrillator) present      Cardiac LV ejection fraction 10-20%        Allergies    No Known Allergies    Intolerances    pork (Other)    FAMILY HISTORY:  FH: lung cancer        Social History:   from [place]      Review of Systems:  unable to obtain 2/2 sedated       Physical Examination:    General: adult male, lying in bed, nad    HEENT: ett and ogt    PULM: coarse b/l    CVS: Regular rate and rhythm, +murmurs    ABD: Soft, nondistended, nontender, normoactive bowel sounds, no masses appreciated    EXT: + edema    SKIN: Warm     NEURO: Sedated      Medications:  meropenem  IVPB 500 milliGRAM(s) IV Intermittent every 12 hours    aMIOdarone    Tablet 200 milliGRAM(s) Oral daily  DOBUTamine Infusion 2.5 MICROgram(s)/kG/Min IV Continuous <Continuous>    albuterol    90 MICROgram(s) HFA Inhaler 2 Puff(s) Inhalation every 6 hours  albuterol/ipratropium for Nebulization. 3 milliLiter(s) Nebulizer once  ipratropium 17 MICROgram(s) HFA Inhaler 1 Puff(s) Inhalation every 6 hours    fentaNYL    Injectable 25 MICROGram(s) IV Push every 2 hours PRN  midazolam Injectable 2 milliGRAM(s) IV Push every 2 hours PRN  propofol Infusion 10 MICROgram(s)/kG/Min IV Continuous <Continuous>      aspirin  chewable 81 milliGRAM(s) Oral daily    pantoprazole  Injectable 40 milliGRAM(s) IV Push daily      dextrose 50% Injectable 25 Gram(s) IV Push once  dextrose 50% Injectable 25 Gram(s) IV Push once  dextrose 50% Injectable 12.5 Gram(s) IV Push once  dextrose Oral Gel 15 Gram(s) Oral once PRN  glucagon  Injectable 1 milliGRAM(s) IntraMuscular once  insulin lispro (ADMELOG) corrective regimen sliding scale   SubCutaneous every 6 hours    dextrose 5%. 1000 milliLiter(s) IV Continuous <Continuous>      chlorhexidine 0.12% Liquid 15 milliLiter(s) Oral Mucosa every 12 hours        Mode: AC/ CMV (Assist Control/ Continuous Mandatory Ventilation)  RR (machine): 20  TV (machine): 400  FiO2: 35  PEEP: 5  ITime: 1  MAP: 10  PIP: 19      ICU Vital Signs Last 24 Hrs  T(C): 37.1 (2023 00:00), Max: 37.3 (2023 20:00)  T(F): 98.7 (2023 00:00), Max: 99.2 (2023 20:00)  HR: 99 (2023 01:00) (78 - 127)  BP: 130/91 (2023 01:00) (83/71 - 136/83)  BP(mean): 102 (2023 01:00) (77 - 102)  ABP: --  ABP(mean): --  RR: 20 (2023 01:00) (13 - 28)  SpO2: 97% (2023 01:00) (95% - 100%)    O2 Parameters below as of 2023 01:00      O2 Concentration (%): 35      Vital Signs Last 24 Hrs  T(C): 37.1 (2023 00:00), Max: 37.3 (2023 20:00)  T(F): 98.7 (2023 00:00), Max: 99.2 (2023 20:00)  HR: 99 (2023 01:00) (78 - 127)  BP: 130/91 (2023 01:00) (83/71 - 136/83)  BP(mean): 102 (2023 01:00) (77 - 102)  RR: 20 (2023 01:00) (13 - 28)  SpO2: 97% (2023 01:00) (95% - 100%)    Parameters below as of 2023 01:00      O2 Concentration (%): 35    ABG - ( 2023 06:41 )  pH, Arterial: 7.39  pH, Blood: x     /  pCO2: 28    /  pO2: 65    / HCO3: 17    / Base Excess: -8.1  /  SaO2: 96.1                I&O's Detail    2023 07:01  -  2023 07:00  --------------------------------------------------------  IN:    DOBUTamine: 463.6 mL    Enteral Tube Flush: 200 mL    IV PiggyBack: 50 mL    Nepro with Carb Steady: 480 mL    Propofol: 651.4 mL  Total IN: 1845 mL    OUT:    Chest Tube (mL): 0 mL    Chest Tube (mL): 10 mL    Dexmedetomidine: 0 mL    Indwelling Catheter - Urethral (mL): 1100 mL    Rectal Tube (mL): 500 mL  Total OUT: 1610 mL    Total NET: 235 mL      2023 07:01  -  2023 01:15  --------------------------------------------------------  IN:    DOBUTamine: 207.4 mL    DOBUTamine: 6.1 mL    Enteral Tube Flush: 500 mL    IV PiggyBack: 50 mL    Nepro with Carb Steady: 720 mL    Propofol: 310.7 mL  Total IN: 1794.2 mL    OUT:    Indwelling Catheter - Urethral (mL): 700 mL  Total OUT: 700 mL    Total NET: 1094.2 mL        LABS:                        10.9   19.58 )-----------( 218      ( 2023 06:01 )             32.2     01-06    139  |  104  |  110<H>  ----------------------------<  121<H>  3.7   |  18<L>  |  4.86<H>    Ca    8.9      2023 06:01    TPro  6.6  /  Alb  2.1<L>  /  TBili  0.8  /  DBili  x   /  AST  39  /  ALT  91<H>  /  AlkPhos  270<H>            CAPILLARY BLOOD GLUCOSE      POCT Blood Glucose.: 112 mg/dL (2023 00:11)        CULTURES:        RADIOLOGY:   < from: Xray Chest 1 View- PORTABLE-Urgent (Xray Chest 1 View- PORTABLE-Urgent .) (23 @ 16:23) >  ACC: [id number] EXAM:  XR CHEST PORTABLE URGENT 1V                          PROCEDURE DATE:  2023          INTERPRETATION:  Blank    Portable chest radiograph    CLINICAL INFORMATION: Dyspnea, shortness of breath.    TECHNIQUE:  Portable  AP chest radiograph.    COMPARISON: None. .    FINDINGS:  CATHETERS AND TUBES: LEFT chest tube tip overlies LEFT apex.  ET tube tip above tracheal bifurcation.  NG tube tip beyond GE junction.      PULMONARY: Moderate RIGHT effusion and/or basilar airspaceconsolidation   obscures RIGHT peripheral diaphragm contour.   RIGHT upper zone and LEFT lung parenchyma grossly clear.  .   No pneumothorax.    HEART/VASCULAR: Vascular heartbeat.    BONES: Visualized osseous structures are intact.    IMPRESSION: Nosignificant interval change.  Catheters and tubes in place.  Residual moderate RIGHT effusion and/or basilar airspace consolidation.  Cardiomegaly..    --- End of Report ---            JAYCOB HARDY MD; Attending Radiologist  This document has been electronically signed. 2023  5:54PM    < end of copied text >        SUPPLEMENTAL O2:   LINES:  ERIC:  FABIEN:   PPx:   CONTACT:

## 2023-01-07 NOTE — PROGRESS NOTE ADULT - ASSESSMENT
62M CAD, Dilated cardiomyopathy, S/p AICD, Afib/flutter, h/o substance abuse, CKD baseline creat approx 1.4 last admitted to [facility] with MSSA bacteremia, and CHF.  Sent from [facility] with acute hypoxic respiratory failure/PEA arrest.  Initial CT Head - No acute pathology.  Repeat CT head 12/29 - : No acute intracranial hemorrhage, mass effect, or shift of the midline structures. Similar-appearing mild chronic white matter microvascular type changes.  Pt seem to follow some commands when not seadated. Moes left hand and foot on command on occasion. No movements seen on right side.  MRI Brain when feasible.  HbA1c - 6.4 on 12/24  LDL - 98  - Has Elevated LFTs - Lipitor will not be used at this point.  NIHSS - can't be accessed accurately.  On ASA 81  Renal failure. Cr 4.86  Elevated LFTs.  Severe left ventricular systolic dysfunction. EF 10 %  Pt with multi organ failure. Poor overall prognosis.  Pt is DNR appropriate.  D/w covering nurse.  Would follow.

## 2023-01-07 NOTE — PROGRESS NOTE ADULT - ASSESSMENT
The patient is a 62 year old male with a history of CAD, chronic systolic heart failure s/p ICD, atrial flutter s/p DCCV, substance abuse, CKD who is admitted with respiratory failure in the setting of tension pneumothorax complicated by cardiac arrest.    1/7/32  Seen at [facility] ICU  Lying flat, sleeping comfortably  On propofol    Plan:  - ECG with sinus tachycardia and known LBBB  - Echo 2/22 with severely reduced LV (EF 10%) and RV function, mod MR, mod TR, mild pulm HTN  - Not on beta blocker as the patient is on dobutamine. Not a candidate for ACEI/ARB/ARNI at the current time due to renal function.  - Poor renal function - hold apixaban  - Hold bumetanide and spironolactone due to DERRICK  - Continue amiodarone 200 mg daily  - Continue aspirin 81 mg daily  - Renal function improving now  - Continue dobutamine 5 mcg/kg/min  - Chest tubes in place  - Mechanical ventilation  - ICU care  - Overall poor prognosis. Comfort care would be most appropriate.

## 2023-01-07 NOTE — PROGRESS NOTE ADULT - ASSESSMENT
REVIEW OF SYMPTOMS      Able to give (reliable) ROS  NO     PHYSICAL EXAM    HEENT Unremarkable  atraumatic   RESP Fair air entry EXP prolonged    Harsh breath sound Resp distres mild   CARDIAC S1 S2 No S3     NO JVD    ABDOMEN SOFT BS PRESENT NOT DISTENDED No hepatosplenomegaly   PEDAL EDEMA present No calf tenderness  NO rash       GENERAL DATA .   GOC.   12/23/2022 full code  ALLGY.     nka                  WT.     12/24/2022 81           BMI.       12/24/2022 26          ICU STAY. .. 12/23/2022  COVID. ..  12/23/2022 scv2 (-)   BEST PRACTICE ISSUES.    HOB ELEVATN. Yes  DVT PPLX. ..    12/23/2022 hpsc   WHITMORE PPLX. ..    12/23/2022 protonix 40   INFN PPLX. ..  12/23/2022 chlorhexidine .12%   SP SW VIVIAN.         DIET.  .. 12/26 nepro 960 ng    IV fl...  FREE WATER 1/4 .2   PROCEDURES...   12/23/2022  l chest tube   12/23/2022 intubated   12/23/2022 reed     PAST PROCEDURES.  .  12/25/2022 glucerna 960 ng .     ABGS.  1/6/2023 vent 30% 739/28/65     VS/ PO/IO/ VENT/ DRIPS.   1/7/2023 99f 100 120/80   1/7/2023 8p dobu 5 m/k/m   1/7/2023 9p prop 30 m/k/m   1/7/2023 7a cpap 5/10/.3 r 18 svt 550     AGE doa cc.  62 m doa 12/23/2022 resp distress    PREV ADMISSION 2/11-2/25/2022 [facility]    PMH  PMH COPD  PMH COVID (+) 2/11/2022   PMH S aureus bacteremia mssa 2/11   .. Ancef 2/12/2022 Dr Phan  [facility] AICD  PMH HFREF  .. ECHO 11/20/2021 ef 20%  PMH A fib (on eliquis)     PROBLEMS ASSESSMENT RECOMMENDATIONS.  VENT   .. bundle dsv dsbt ltvv pplat 30 (-) PO2 60 (+) ph 7.3 (+)  .. lung protective ventilatn   .. wean as told if fails then trach  PROLONGED INTUBATN.  .. Plan trach vs GOC determination   WEANING  .. If tolerates cpap will plan extrubation 1/9a   SEDATION.  .. dexm 12/30 -->   .. target rass 0 to (-) 1   HEMOPTYSIS   .. 1/3 given ddavp 25  .. 5 d meropenem started 1/3   .. 1/5/2023 no further hemoptysis   INFECTION.  .. 1/6/2023 Has leukocytosis   .. 1/3 meroipenem started as hemoptysis leukocytosi   DERRICK   .. HD if decided by renal   .. Creat improving   CHEST TUBE   .. 2nd chest tube placed on 1/3 because of worsening pntx   .. to be removed after trach or extubation which ever comes first   CHF.  .. On dobu started 1/3-->       TIME SPENT   Over 39 minutes aggregate critical care time spent on encounter; activities included   direct patient care, counseling and/or coordinating care reviewing notes, lab data/ imaging , discussion with multidisciplinary team/ patient  /family and explaining in detail risks, benefits, alternatives  of the recommendations     BIPIN DE LA CRUZ 61 m 12/23/2022 1962 DR KELVIN VANESSA

## 2023-01-08 NOTE — PROGRESS NOTE ADULT - ASSESSMENT
The patient is a 62 year old male with a history of CAD, chronic systolic heart failure s/p ICD, atrial flutter s/p DCCV, substance abuse, CKD who is admitted with respiratory failure in the setting of tension pneumothorax complicated by cardiac arrest.    1/8/32  Seen at [facility] ICU  Lying flat, sleeping comfortably  On propofol  WBC- 14.82  BUN/Creat- 88/3.34  Alk phos- 346     SGOT- 50    SGPT- 72    Plan:  - ECG with sinus tachycardia and known LBBB  - Echo 2/22 with severely reduced LV (EF 10%) and RV function, mod MR, mod TR, mild pulm HTN  - Not on beta blocker as the patient is on dobutamine. Not a candidate for ACEI/ARB/ARNI at the current time due to renal function.  - Poor renal function - hold apixaban  - Hold bumetanide and spironolactone due to DERRICK  - Continue amiodarone 200 mg daily  - Continue aspirin 81 mg daily  - Renal function improving now  - Continue dobutamine 5 mcg/kg/min  - Chest tubes in place  - Mechanical ventilation  - ICU care  - Overall poor prognosis. Comfort care would be most appropriate.

## 2023-01-08 NOTE — PROGRESS NOTE ADULT - SUBJECTIVE AND OBJECTIVE BOX
Patient is a 62y old  Male who presents with a chief complaint of resp failure (08 Jan 2023 13:43)      Subjective:  INTERVAL HPI/OVERNIGHT EVENTS: Patient seen and examined at bedside.  Patient sedated    MEDICATIONS  (STANDING):  albuterol    90 MICROgram(s) HFA Inhaler 2 Puff(s) Inhalation every 6 hours  albuterol/ipratropium for Nebulization. 3 milliLiter(s) Nebulizer once  aMIOdarone    Tablet 200 milliGRAM(s) Oral daily  aspirin  chewable 81 milliGRAM(s) Oral daily  chlorhexidine 0.12% Liquid 15 milliLiter(s) Oral Mucosa every 12 hours  dextrose 5%. 1000 milliLiter(s) (100 mL/Hr) IV Continuous <Continuous>  dextrose 50% Injectable 25 Gram(s) IV Push once  dextrose 50% Injectable 12.5 Gram(s) IV Push once  dextrose 50% Injectable 25 Gram(s) IV Push once  DOBUTamine Infusion 2.5 MICROgram(s)/kG/Min (6.12 mL/Hr) IV Continuous <Continuous>  glucagon  Injectable 1 milliGRAM(s) IntraMuscular once  heparin   Injectable 5000 Unit(s) SubCutaneous every 8 hours  insulin lispro (ADMELOG) corrective regimen sliding scale   SubCutaneous every 6 hours  ipratropium 17 MICROgram(s) HFA Inhaler 1 Puff(s) Inhalation every 6 hours  meropenem  IVPB 500 milliGRAM(s) IV Intermittent every 12 hours  nystatin Powder 1 Application(s) Topical two times a day  pantoprazole  Injectable 40 milliGRAM(s) IV Push daily  propofol Infusion 10 MICROgram(s)/kG/Min (4.9 mL/Hr) IV Continuous <Continuous>    MEDICATIONS  (PRN):  dextrose Oral Gel 15 Gram(s) Oral once PRN Blood Glucose LESS THAN 70 milliGRAM(s)/deciliter  fentaNYL    Injectable 25 MICROGram(s) IV Push every 2 hours PRN pain or vent synchrony  midazolam Injectable 2 milliGRAM(s) IV Push every 2 hours PRN Agitation      Allergies    No Known Allergies    Intolerances    pork (Other)      REVIEW OF SYSTEMS:  not able to obtain due to altered mentation/sedation       Objective:  Vital Signs Last 24 Hrs  T(C): 37 (08 Jan 2023 15:33), Max: 37.7 (08 Jan 2023 12:01)  T(F): 98.6 (08 Jan 2023 15:33), Max: 99.8 (08 Jan 2023 12:01)  HR: 85 (08 Jan 2023 15:54) (75 - 113)  BP: 126/96 (08 Jan 2023 15:00) (108/76 - 136/83)  BP(mean): 105 (08 Jan 2023 15:00) (73 - 108)  RR: 21 (08 Jan 2023 15:00) (17 - 28)  SpO2: 98% (08 Jan 2023 15:30) (95% - 100%)    Parameters below as of 08 Jan 2023 15:00  Patient On (Oxygen Delivery Method): ventilator        GENERAL: lying in bed  HEAD:  Normocephalic  EYES:  conjunctiva and sclera clear  ENT: Moist mucous membranes  NECK: Supple  CHEST/LUNG: Clear to auscultation bilaterally;no wheezing.   HEART: Regular rate and rhythm; S1S2+  ABDOMEN: Bowel sounds present; Soft, Nontender, Nondistended.   EXTREMITIES: no limb movement noticed   NERVOUS SYSTEM:  sedated      LABS:                        10.1   14.82 )-----------( 242      ( 08 Jan 2023 06:34 )             31.0     08 Jan 2023 06:34    144    |  108    |  88     ----------------------------<  114    3.8     |  21     |  3.34     Ca    8.9        08 Jan 2023 06:34  Phos  4.9       08 Jan 2023 06:34  Mg     2.3       08 Jan 2023 00:52    TPro  6.0    /  Alb  2.1    /  TBili  0.8    /  DBili  x      /  AST  50     /  ALT  72     /  AlkPhos  346    08 Jan 2023 06:34    PT/INR - ( 08 Jan 2023 06:34 )   PT: 13.8 sec;   INR: 1.20 ratio             CAPILLARY BLOOD GLUCOSE      POCT Blood Glucose.: 146 mg/dL (08 Jan 2023 11:24)  POCT Blood Glucose.: 112 mg/dL (08 Jan 2023 06:31)  POCT Blood Glucose.: 112 mg/dL (08 Jan 2023 00:11)  POCT Blood Glucose.: 108 mg/dL (07 Jan 2023 17:17)      Consultant(s) Notes Reviewed:  [x] YES  [ ] NO

## 2023-01-08 NOTE — PROGRESS NOTE ADULT - SUBJECTIVE AND OBJECTIVE BOX
Date/Time Patient Seen:  		  Referring MD:   Data Reviewed	       Patient is a 62y old  Male who presents with a chief complaint of resp failure (07 Jan 2023 23:14)      Subjective/HPI     PAST MEDICAL & SURGICAL HISTORY:  Heart failure, systolic    CAD (coronary artery disease)    Hypertension    Nonischemic cardiomyopathy    COPD, moderate    2019 novel coronavirus disease (COVID-19)    Substance abuse    HLD (hyperlipidemia)    Cardiac LV ejection fraction 10-20%    No significant past surgical history    AICD (automatic cardioverter/defibrillator) present    Cardiac LV ejection fraction 10-20%          Medication list         MEDICATIONS  (STANDING):  albuterol    90 MICROgram(s) HFA Inhaler 2 Puff(s) Inhalation every 6 hours  albuterol/ipratropium for Nebulization. 3 milliLiter(s) Nebulizer once  aMIOdarone    Tablet 200 milliGRAM(s) Oral daily  aspirin  chewable 81 milliGRAM(s) Oral daily  chlorhexidine 0.12% Liquid 15 milliLiter(s) Oral Mucosa every 12 hours  dextrose 5%. 1000 milliLiter(s) (100 mL/Hr) IV Continuous <Continuous>  dextrose 50% Injectable 25 Gram(s) IV Push once  dextrose 50% Injectable 12.5 Gram(s) IV Push once  dextrose 50% Injectable 25 Gram(s) IV Push once  DOBUTamine Infusion 2.5 MICROgram(s)/kG/Min (6.12 mL/Hr) IV Continuous <Continuous>  glucagon  Injectable 1 milliGRAM(s) IntraMuscular once  heparin   Injectable 5000 Unit(s) SubCutaneous every 8 hours  insulin lispro (ADMELOG) corrective regimen sliding scale   SubCutaneous every 6 hours  ipratropium 17 MICROgram(s) HFA Inhaler 1 Puff(s) Inhalation every 6 hours  meropenem  IVPB 500 milliGRAM(s) IV Intermittent every 12 hours  pantoprazole  Injectable 40 milliGRAM(s) IV Push daily  propofol Infusion 10 MICROgram(s)/kG/Min (4.9 mL/Hr) IV Continuous <Continuous>    MEDICATIONS  (PRN):  dextrose Oral Gel 15 Gram(s) Oral once PRN Blood Glucose LESS THAN 70 milliGRAM(s)/deciliter  fentaNYL    Injectable 25 MICROGram(s) IV Push every 2 hours PRN pain or vent synchrony  midazolam Injectable 2 milliGRAM(s) IV Push every 2 hours PRN Agitation         Vitals log        ICU Vital Signs Last 24 Hrs  T(C): 36.7 (08 Jan 2023 03:47), Max: 37.3 (07 Jan 2023 20:00)  T(F): 98.1 (08 Jan 2023 03:47), Max: 99.2 (07 Jan 2023 20:00)  HR: 98 (08 Jan 2023 06:00) (78 - 111)  BP: 135/92 (08 Jan 2023 06:00) (108/76 - 136/83)  BP(mean): 104 (08 Jan 2023 06:00) (73 - 105)  ABP: --  ABP(mean): --  RR: 20 (08 Jan 2023 06:00) (14 - 28)  SpO2: 97% (08 Jan 2023 06:00) (95% - 100%)    O2 Parameters below as of 08 Jan 2023 06:00  Patient On (Oxygen Delivery Method): ventilator    O2 Concentration (%): 35         Mode: AC/ CMV (Assist Control/ Continuous Mandatory Ventilation)  RR (machine): 20  TV (machine): 400  FiO2: 35  PEEP: 5  ITime: 1  MAP: 13  PIP: 33      Input and Output:  I&O's Detail    07 Jan 2023 07:01  -  08 Jan 2023 07:00  --------------------------------------------------------  IN:    DOBUTamine: 207.4 mL    DOBUTamine: 36.6 mL    Enteral Tube Flush: 700 mL    IV PiggyBack: 100 mL    Nepro with Carb Steady: 960 mL    Propofol: 384.2 mL  Total IN: 2388.2 mL    OUT:    Chest Tube (mL): 40 mL    Chest Tube (mL): 30 mL    Indwelling Catheter - Urethral (mL): 1300 mL  Total OUT: 1370 mL    Total NET: 1018.2 mL          Lab Data                        10.1   14.82 )-----------( 242      ( 08 Jan 2023 06:34 )             31.0     01-08    144  |  108  |  88<H>  ----------------------------<  114<H>  3.8   |  21<L>  |  3.34<H>    Ca    8.9      08 Jan 2023 06:34  Phos  4.9     01-08  Mg     2.3     01-08    TPro  6.0  /  Alb  2.1<L>  /  TBili  0.8  /  DBili  x   /  AST  50<H>  /  ALT  72<H>  /  AlkPhos  346<H>  01-08            Review of Systems	      Objective     Physical Examination    heart s1s2  lung dec BS      Pertinent Lab findings & Imaging      Bailey:  NO   Adequate UO     I&O's Detail    07 Jan 2023 07:01  -  08 Jan 2023 07:00  --------------------------------------------------------  IN:    DOBUTamine: 207.4 mL    DOBUTamine: 36.6 mL    Enteral Tube Flush: 700 mL    IV PiggyBack: 100 mL    Nepro with Carb Steady: 960 mL    Propofol: 384.2 mL  Total IN: 2388.2 mL    OUT:    Chest Tube (mL): 40 mL    Chest Tube (mL): 30 mL    Indwelling Catheter - Urethral (mL): 1300 mL  Total OUT: 1370 mL    Total NET: 1018.2 mL               Discussed with:     Cultures:	        Radiology

## 2023-01-08 NOTE — PROGRESS NOTE ADULT - SUBJECTIVE AND OBJECTIVE BOX
DOROTHY VOSS    [facility]    Allergies    No Known Allergies    Intolerances    pork (Other)      PAST MEDICAL & SURGICAL HISTORY:  Heart failure, systolic      CAD (coronary artery disease)      Hypertension      Nonischemic cardiomyopathy      COPD, moderate      2019 novel coronavirus disease (COVID-19)      Substance abuse      HLD (hyperlipidemia)      Cardiac LV ejection fraction 10-20%      AICD (automatic cardioverter/defibrillator) present      Cardiac LV ejection fraction 10-20%          FAMILY HISTORY:  FH: lung cancer        Home Medications:  acetaminophen 325 mg oral tablet: 2 tab(s) orally every 6 hours, As needed, Temp greater or equal to 38C (100.4F), Mild Pain (1 - 3) (23 Dec 2022 10:07)  apixaban 5 mg oral tablet: 1 tab(s) orally every 12 hours (23 Dec 2022 10:07)  Aquaphor Healing topical ointment: Apply topically to affected area once a day (23 Dec 2022 10:07)  ascorbic acid 500 mg oral tablet: 1 tab(s) orally once a day (23 Dec 2022 10:07)  Bacid (LAC) oral capsule: 2 cap(s) orally once a day (23 Dec 2022 10:07)  bumetanide 2 mg oral tablet: 1 tab(s) orally 2 times a day (23 Dec 2022 10:07)  gabapentin 100 mg oral capsule: 1 cap(s) orally 3 times a day (23 Dec 2022 10:07)  melatonin 3 mg oral tablet: 1 tab(s) orally once a day (at bedtime), As needed, Insomnia (23 Dec 2022 10:07)  Multiple Vitamins with Minerals oral tablet: 1 tab(s) orally once a day (23 Dec 2022 10:07)  pantoprazole 40 mg oral delayed release tablet: 1 tab(s) orally once a day (before a meal) (23 Dec 2022 10:07)  sacubitril-valsartan 24 mg-26 mg oral tablet: 1 tab(s) orally 2 times a day (23 Dec 2022 10:07)  simethicone 80 mg oral tablet: 2 tab(s) orally every 6 hours, As Needed (23 Dec 2022 10:07)  spironolactone 25 mg oral tablet: 1 tab(s) orally once a day (23 Dec 2022 10:07)  Symbicort 160 mcg-4.5 mcg/inh inhalation aerosol: 2 puff(s) inhaled 2 times a day (23 Dec 2022 10:07)  Ventolin HFA 90 mcg/inh inhalation aerosol: 2 puff(s) inhaled every 6 hours, As Needed (23 Dec 2022 10:07)      MEDICATIONS  (STANDING):  albuterol    90 MICROgram(s) HFA Inhaler 2 Puff(s) Inhalation every 6 hours  albuterol/ipratropium for Nebulization. 3 milliLiter(s) Nebulizer once  aMIOdarone    Tablet 200 milliGRAM(s) Oral daily  aspirin  chewable 81 milliGRAM(s) Oral daily  chlorhexidine 0.12% Liquid 15 milliLiter(s) Oral Mucosa every 12 hours  dextrose 5%. 1000 milliLiter(s) (100 mL/Hr) IV Continuous <Continuous>  dextrose 50% Injectable 25 Gram(s) IV Push once  dextrose 50% Injectable 25 Gram(s) IV Push once  dextrose 50% Injectable 12.5 Gram(s) IV Push once  DOBUTamine Infusion 2.5 MICROgram(s)/kG/Min (6.12 mL/Hr) IV Continuous <Continuous>  glucagon  Injectable 1 milliGRAM(s) IntraMuscular once  heparin   Injectable 5000 Unit(s) SubCutaneous every 8 hours  insulin lispro (ADMELOG) corrective regimen sliding scale   SubCutaneous every 6 hours  ipratropium 17 MICROgram(s) HFA Inhaler 1 Puff(s) Inhalation every 6 hours  meropenem  IVPB 500 milliGRAM(s) IV Intermittent every 12 hours  pantoprazole  Injectable 40 milliGRAM(s) IV Push daily  propofol Infusion 10 MICROgram(s)/kG/Min (4.9 mL/Hr) IV Continuous <Continuous>    MEDICATIONS  (PRN):  dextrose Oral Gel 15 Gram(s) Oral once PRN Blood Glucose LESS THAN 70 milliGRAM(s)/deciliter  fentaNYL    Injectable 25 MICROGram(s) IV Push every 2 hours PRN pain or vent synchrony  midazolam Injectable 2 milliGRAM(s) IV Push every 2 hours PRN Agitation      Diet, NPO with Tube Feed:   Tube Feeding Modality: Nasogastric  Nepro with Carb Steady  Total Volume for 24 Hours (mL): 960  Continuous  Starting Tube Feed Rate mL per Hour: 20  Increase Tube Feed Rate by (mL): 10     Every 4 hours  Until Goal Tube Feed Rate (mL per Hour): 40  Tube Feed Duration (in Hours): 24  Tube Feed Start Time: 13:00  Free Water Flush  Pump   Rate (mL per Hour): 30 (12-26-22 @ 23:12) [Active]          Vital Signs Last 24 Hrs  T(C): 36.7 (08 Jan 2023 08:00), Max: 37.3 (07 Jan 2023 20:00)  T(F): 98.1 (08 Jan 2023 08:00), Max: 99.2 (07 Jan 2023 20:00)  HR: 75 (08 Jan 2023 08:30) (75 - 111)  BP: 133/89 (08 Jan 2023 08:01) (108/76 - 136/83)  BP(mean): 102 (08 Jan 2023 08:01) (73 - 105)  RR: 20 (08 Jan 2023 08:01) (15 - 28)  SpO2: 98% (08 Jan 2023 08:30) (95% - 100%)    Parameters below as of 08 Jan 2023 08:01  Patient On (Oxygen Delivery Method): ventilator    O2 Concentration (%): 35      01-07-23 @ 07:01  -  01-08-23 @ 07:00  --------------------------------------------------------  IN: 2409 mL / OUT: 1370 mL / NET: 1039 mL    01-08-23 @ 07:01  -  01-08-23 @ 10:32  --------------------------------------------------------  IN: 121.6 mL / OUT: 0 mL / NET: 121.6 mL        Mode: CPAP with PS, FiO2: 35, PEEP: 5, ITime: 1, MAP: 10, PIP: 15      LABS:                        10.1   14.82 )-----------( 242      ( 08 Jan 2023 06:34 )             31.0     01-08    144  |  108  |  88<H>  ----------------------------<  114<H>  3.8   |  21<L>  |  3.34<H>    Ca    8.9      08 Jan 2023 06:34  Phos  4.9     01-08  Mg     2.3     01-08    TPro  6.0  /  Alb  2.1<L>  /  TBili  0.8  /  DBili  x   /  AST  50<H>  /  ALT  72<H>  /  AlkPhos  346<H>  01-08    PT/INR - ( 08 Jan 2023 06:34 )   PT: 13.8 sec;   INR: 1.20 ratio                   WBC:  WBC Count: 14.82 K/uL (01-08 @ 06:34)  WBC Count: 19.58 K/uL (01-06 @ 06:01)  WBC Count: 14.54 K/uL (01-05 @ 06:17)      MICROBIOLOGY:  RECENT CULTURES:              PT/INR - ( 08 Jan 2023 06:34 )   PT: 13.8 sec;   INR: 1.20 ratio             Sodium:  Sodium, Serum: 144 mmol/L (01-08 @ 06:34)  Sodium, Serum: 141 mmol/L (01-08 @ 00:52)  Sodium, Serum: 139 mmol/L (01-06 @ 06:01)  Sodium, Serum: 136 mmol/L (01-06 @ 00:47)  Sodium, Serum: 134 mmol/L (01-05 @ 06:17)      3.34 mg/dL 01-08 @ 06:34  3.48 mg/dL 01-08 @ 00:52  4.86 mg/dL 01-06 @ 06:01  4.89 mg/dL 01-06 @ 00:47  5.49 mg/dL 01-05 @ 06:17      Hemoglobin:  Hemoglobin: 10.1 g/dL (01-08 @ 06:34)  Hemoglobin: 10.9 g/dL (01-06 @ 06:01)  Hemoglobin: 10.5 g/dL (01-05 @ 06:17)      Platelets: Platelet Count - Automated: 242 K/uL (01-08 @ 06:34)  Platelet Count - Automated: 218 K/uL (01-06 @ 06:01)  Platelet Count - Automated: 180 K/uL (01-05 @ 06:17)      LIVER FUNCTIONS - ( 08 Jan 2023 06:34 )  Alb: 2.1 g/dL / Pro: 6.0 g/dL / ALK PHOS: 346 U/L / ALT: 72 U/L DA / AST: 50 U/L / GGT: x                 RADIOLOGY & ADDITIONAL STUDIES:      MICROBIOLOGY:  RECENT CULTURES:

## 2023-01-08 NOTE — PROGRESS NOTE ADULT - ASSESSMENT
Pt is a 61 y/o M pmhx of HTN, dilated cardiomyopathy s/p AICD, CKD, opoid/substance abuse, afib/aflutter who presented to  ED w/ complaints of acute hypoxic respiratory failure requiring intubation. Complicated by L sided PTX following intubation, followed by PEA cardiac arrest, chest tube placed emergently in ED w/ ROSC, admitted to [facility]. Course further complicated by ASP PNA, shock and DERRICK on CKD.     1. Sepsis   2. ASP PNA   3. Cardiac arrest   4. Hypoxic respiratory failure   5. L PTX   6. Metabolic encephalopathy   7. DERRICK on CKD   8. Transaminitis   9. VT     Plan:     Neuro: Sedated on vent w/ prop w/ fent and versed IVP PRN     CV: Remains in shock state on dobutamine for improved inotropic support for improved diuresis. Dose decreased to 2.5 following run of VT. Continue to monitor and titrate based off U/O.     Pulm: Hypoxic respiratory failure secondary to cardiac arrest, Low TV ventilation using 4-6cc/kg of IBW for lung protective strategies, will titrate FiO2 to maintain SpO2>90%.     GI: Diet: NPO w/ tube feeds.     Renal: DERRICK in setting of shock state, on dobutamine gtt for improved CO and U/O, continue to monitor and maintain >0.5cc/kg/hr.     Endo: Glucose <180 w/ ISS, Mag>2, K>4 for arrythmia suppression.     Heme: Heparin for DVT PPX     ID: + Sputum for pseudomonas and + UCx for Klebsiella, completed course of zosyn on 1/1/23, started on meropenem following increase in WBC and hypothermia. Continue to trend markers of infection.

## 2023-01-08 NOTE — PROGRESS NOTE ADULT - SUBJECTIVE AND OBJECTIVE BOX
Patient is a 62y Male with a known history of :    HPI:  62M CAD, Dilated cardiomyopathy, S/p AICD, Afib/flutter, h/o substance abuse, CKD baseline creat approx 1.4 last admitted to [facility] with MSSA bacteremia, and CHF.  Sent from [facility] SNF with acute hypoxic respiratory failure.  Was initially on BiPAP then became hypoxic.  Anesthesia intubated patient.  On post intubation Xray pneumothorax evident.  Patient with PEA arrest.  Chest tube placed by ED physician.  ROSC obtained.     Patient unable to give further history.     (23 Dec 2022 13:02)      REVIEW OF SYSTEMS:    CONSTITUTIONAL: No fever, weight loss, or fatigue  EYES: No eye pain, visual disturbances, or discharge  ENMT:  No difficulty hearing, tinnitus, vertigo; No sinus or throat pain  NECK: No pain or stiffness  BREASTS: No pain, masses, or nipple discharge  RESPIRATORY: No cough, wheezing, chills or hemoptysis; No shortness of breath  CARDIOVASCULAR: No chest pain, palpitations, dizziness, or leg swelling  GASTROINTESTINAL: No abdominal or epigastric pain. No nausea, vomiting, or hematemesis; No diarrhea or constipation. No melena or hematochezia.  GENITOURINARY: No dysuria, frequency, hematuria, or incontinence  NEUROLOGICAL: No headaches, memory loss, loss of strength, numbness, or tremors  SKIN: No itching, burning, rashes, or lesions   LYMPH NODES: No enlarged glands  ENDOCRINE: No heat or cold intolerance; No hair loss  MUSCULOSKELETAL: No joint pain or swelling; No muscle, back, or extremity pain  PSYCHIATRIC: No depression, anxiety, mood swings, or difficulty sleeping  HEME/LYMPH: No easy bruising, or bleeding gums  ALLERGY AND IMMUNOLOGIC: No hives or eczema    MEDICATIONS  (STANDING):  albuterol    90 MICROgram(s) HFA Inhaler 2 Puff(s) Inhalation every 6 hours  albuterol/ipratropium for Nebulization. 3 milliLiter(s) Nebulizer once  aMIOdarone    Tablet 200 milliGRAM(s) Oral daily  aspirin  chewable 81 milliGRAM(s) Oral daily  chlorhexidine 0.12% Liquid 15 milliLiter(s) Oral Mucosa every 12 hours  dextrose 5%. 1000 milliLiter(s) (100 mL/Hr) IV Continuous <Continuous>  dextrose 50% Injectable 25 Gram(s) IV Push once  dextrose 50% Injectable 12.5 Gram(s) IV Push once  dextrose 50% Injectable 25 Gram(s) IV Push once  DOBUTamine Infusion 2.5 MICROgram(s)/kG/Min (6.12 mL/Hr) IV Continuous <Continuous>  glucagon  Injectable 1 milliGRAM(s) IntraMuscular once  heparin   Injectable 5000 Unit(s) SubCutaneous every 8 hours  insulin lispro (ADMELOG) corrective regimen sliding scale   SubCutaneous every 6 hours  ipratropium 17 MICROgram(s) HFA Inhaler 1 Puff(s) Inhalation every 6 hours  meropenem  IVPB 500 milliGRAM(s) IV Intermittent every 12 hours  pantoprazole  Injectable 40 milliGRAM(s) IV Push daily  propofol Infusion 10 MICROgram(s)/kG/Min (4.9 mL/Hr) IV Continuous <Continuous>    MEDICATIONS  (PRN):  dextrose Oral Gel 15 Gram(s) Oral once PRN Blood Glucose LESS THAN 70 milliGRAM(s)/deciliter  fentaNYL    Injectable 25 MICROGram(s) IV Push every 2 hours PRN pain or vent synchrony  midazolam Injectable 2 milliGRAM(s) IV Push every 2 hours PRN Agitation      ALLERGIES: No Known Allergies      FAMILY HISTORY:  FH: lung cancer        Social history:  Alochol:   Smoking:   Drug Use:   Marital Status:     PHYSICAL EXAMINATION:  -----------------------------  T(C): 36.7 (01-08-23 @ 08:00), Max: 37.3 (01-07-23 @ 20:00)  HR: 75 (01-08-23 @ 08:30) (75 - 111)  BP: 133/89 (01-08-23 @ 08:01) (108/76 - 136/83)  RR: 20 (01-08-23 @ 08:01) (15 - 28)  SpO2: 98% (01-08-23 @ 08:30) (95% - 100%)  Wt(kg): --    01-07 @ 07:01  -  01-08 @ 07:00  --------------------------------------------------------  IN:    DOBUTamine: 207.4 mL    DOBUTamine: 42.7 mL    Enteral Tube Flush: 700 mL    IV PiggyBack: 100 mL    Nepro with Carb Steady: 960 mL    Propofol: 398.9 mL  Total IN: 2409 mL    OUT:    Chest Tube (mL): 40 mL    Chest Tube (mL): 30 mL    Indwelling Catheter - Urethral (mL): 1300 mL  Total OUT: 1370 mL    Total NET: 1039 mL      01-08 @ 07:01  -  01-08 @ 09:50  --------------------------------------------------------  IN:    DOBUTamine: 12.2 mL    Nepro with Carb Steady: 80 mL    Propofol: 29.4 mL  Total IN: 121.6 mL    OUT:  Total OUT: 0 mL    Total NET: 121.6 mL            Constitutional: well developed, normal appearance, well groomed, well nourished, no deformities and no acute distress.   Eyes: the conjunctiva exhibited no abnormalities and the eyelids demonstrated no xanthelasmas.   HEENT: normal oral mucosa, no oral pallor and no oral cyanosis.   Neck: normal jugular venous A waves present, normal jugular venous V waves present and no jugular venous malone A waves.   Pulmonary: no respiratory distress, normal respiratory rhythm and effort, no accessory muscle use and lungs were clear to auscultation bilaterally. Anteriorly  Cardiovascular: heart rate and rhythm were normal, normal S1 and S2 and no murmur, gallop, rub, heave or thrill are present.    Musculoskeletal: the gait could not be assessed..   Extremities: no clubbing of the fingernails, no localized cyanosis, no petechial hemorrhages and no ischemic changes.   Skin: normal skin color and pigmentation, no rash, no venous stasis, no skin lesions, no skin ulcer and no xanthoma was observed.       LABS:   --------  01-08    144  |  108  |  88<H>  ----------------------------<  114<H>  3.8   |  21<L>  |  3.34<H>    Ca    8.9      08 Jan 2023 06:34  Phos  4.9     01-08  Mg     2.3     01-08    TPro  6.0  /  Alb  2.1<L>  /  TBili  0.8  /  DBili  x   /  AST  50<H>  /  ALT  72<H>  /  AlkPhos  346<H>  01-08                         10.1   14.82 )-----------( 242      ( 08 Jan 2023 06:34 )             31.0     PT/INR - ( 08 Jan 2023 06:34 )   PT: 13.8 sec;   INR: 1.20 ratio               160 mg/dL, --, 36 mg/dL<L>, 133 mg/dL          Radiology:

## 2023-01-08 NOTE — PROGRESS NOTE ADULT - ASSESSMENT
REVIEW OF SYMPTOMS      Able to give (reliable) ROS  NO     PHYSICAL EXAM    HEENT Unremarkable  atraumatic   RESP Fair air entry EXP prolonged    Harsh breath sound Resp distres mild   CARDIAC S1 S2 No S3     NO JVD    ABDOMEN SOFT BS PRESENT NOT DISTENDED No hepatosplenomegaly   PEDAL EDEMA present No calf tenderness  NO rash       GENERAL DATA .   GOC.   12/23/2022 full code  ALLGY.     nka                  WT.     12/24/2022 81           BMI.       12/24/2022 26          ICU STAY. .. 12/23/2022  COVID. ..  12/23/2022 scv2 (-)   BEST PRACTICE ISSUES.    HOB ELEVATN. Yes  DVT PPLX. ..    12/23/2022 hpsc   WHITMORE PPLX. ..    12/23/2022 protonix 40   INFN PPLX. ..  12/23/2022 chlorhexidine .12%   SP SW VIVIAN.         DIET.  .. 12/26 nepro 960 ng    IV fl...  FREE WATER 1/4 .2   PROCEDURES...   12/23/2022  l chest tube   12/23/2022 intubated   12/23/2022 reed     PAST PROCEDURES.  .  12/25/2022 glucerna 960 ng .     ABGS.  1/6/2023 vent 30% 739/28/65     VS/ PO/IO/ VENT/ DRIPS.   1/8/2023 115 130/100   1/8/2023 cpap 5/10/.35 18/450 rsbi 40     AGE doa cc.  62 m doa 12/23/2022 resp distress    PREV ADMISSION 2/11-2/25/2022 [facility]    PMH  PMH COPD  PMH COVID (+) 2/11/2022   PMH S aureus bacteremia mssa 2/11   .. Ancef 2/12/2022 Dr Phan  PMH AICD  PMH HFREF  .. ECHO 11/20/2021 ef 20%  PMH A fib (on eliquis)     PROBLEMS ASSESSMENT RECOMMENDATIONS.  Intubation 12/23/2022  VENT   .. bundle dsv dsbt ltvv pplat 30 (-) PO2 60 (+) ph 7.3 (+)  .. lung protective ventilatn   .. wean as told if fails then trach  PROLONGED INTUBATN.  .. Plan trach vs GOC determination   WEANING.  .. 1/8/2023 dw resp pt has lot of secretions   SEDATION.  .. dexm 12/30-1/6  .. prop 1/6 -->   .. target rass 0 to (-) 1   HEMOPTYSIS   .. 1/3 given ddavp 25  .. 5 d meropenem started 1/3   .. 1/5/2023 no further hemoptysis   INFECTION.  .. w 1/6-1/8/2023 w 19  - 14  .. 1/6/2023 Has leukocytosis   .. 1/3 meroipenem restarted as hemoptysis leukocytosis   Anemia.  .. Hb 1/8/2023 Hb 10   .. target hb 7 (+)   DERRICK   .. Cr 1/2-1/8/2023 Cr 5.1 - 3.3   .. Creat improving   CHEST TUBE   .. 2nd chest tube placed on 1/3 because of worsening pntx   .. to be removed after trach or extubation which ever comes first   CHF.  .. On dobu started 1/3-->   OVERALL.  .. Trach if unweanable   .. Taper off dobu as guided by Dr Zapien   .. renal failure improving   .. complete meropenem       TIME SPENT   Over 39 minutes aggregate critical care time spent on encounter; activities included   direct patient care, counseling and/or coordinating care reviewing notes, lab data/ imaging , discussion with multidisciplinary team/ patient  /family and explaining in detail risks, benefits, alternatives  of the recommendations     BIPIN DE LA CRUZ 61 m 12/23/2022 1962 DR KELVIN VANESAS

## 2023-01-08 NOTE — PROGRESS NOTE ADULT - ASSESSMENT
63 y/o M CAD, Dilated cardiomyopathy, S/p AICD, Afib/flutter, h/o substance abuse,  presented with acute hypoxic and hypercarbic respiratory failure associated with pneumothorax which led to cardiac arrest       Acute respiratory failure secondary to tension pneumothorax with leading to cardiac arrest   - continue vent management;  weaning vs. trach .  ENT aware of poss trach.   - continue chest tubes to suction for now.   -duoneb inhalation     Aspiration pneumonia /Sputum Cx psudomonous    completed zosyn x10 days , on meropenem now     hypernatremia  - resolved    Cardiomyopathy, Acute systolic CHF   - hold eliquis suggested   - on dobutamine drip per cardiolgy  - amiodarone    DM2  - not on meds at [facility]  - FS monitoring with lispro coverage scale while critically ill    DERRICK on CKD  - Likely ATN/ischemic   - monitor creatinine: slight improved    - avoid nephrotoxic agents  - per renal : no need for dialysis a this point ,    Prophylactic measure  - DVT proph: heparin SQ has been on hold due to bleeding from ET tube.    - GI proph: protonix      Ethics consult appreciated.  SW aware to evaluate for next steps in either trach/peg/possible HD if renal function worsen  vs   comfort care.

## 2023-01-08 NOTE — PROGRESS NOTE ADULT - ASSESSMENT
62M CAD, Dilated cardiomyopathy, S/p AICD, Afib/flutter, h/o substance abuse, CKD baseline creat approx 1.4 last admitted to [facility] with MSSA bacteremia, and CHF.   Sent from [facility] SNF with acute hypoxic respiratory failure. Was initially on BiPAP then became hypoxic.  Anesthesia intubated patient.  On post intubation Xray pneumothorax evident.  Patient with PEA arrest.  Chest tube placed by ED physician.    S/p PEA arrest, AHRF requiring intubation, Aspiration PNA , Loculated pleural effusions  - BCx negative   - Sputum Cx -- Pseudomonas  - UCx -- Klebsiella  - s/p zosyn x10d course completed 1/1/2023  - WBC up trending and hypothermic- restarted on Antibiotic- Meropenem (started 1/3) with plan for 7 days   - Overall prog poor    Tinea cruris started topical antifungal 1/8/2023    Thank you for consulting us and involving us in the management of this most interesting and challenging case.  We will follow along in the care of this patient. Please call us at [phone] or text me directly on my cell# at [phone] with any concerns.   62M CAD, Dilated cardiomyopathy, S/p AICD, Afib/flutter, h/o substance abuse, CKD baseline creat approx 1.4 last admitted to [facility] with MSSA bacteremia, and CHF.   Sent from [facility] SNF with acute hypoxic respiratory failure. Was initially on BiPAP then became hypoxic.  Anesthesia intubated patient.  On post intubation Xray pneumothorax evident.  Patient with PEA arrest.  Chest tube placed by ED physician.    S/p PEA arrest, AHRF requiring intubation, Aspiration PNA , Loculated pleural effusions  - BCx negative   - Sputum Cx -- Pseudomonas  - UCx -- Klebsiella  - s/p zosyn x10d course completed 1/1/2023  - WBC up trending and hypothermic- restarted on Antibiotic- Meropenem (started 1/3) with plan for 7 days   - Overall prog poor    Tinea cruris started topical antifungal 1/8/2023    Starting tomorrow Dr Phan will be assuming care of this patient so please contact him with any questions, concerns or new micro data.

## 2023-01-08 NOTE — PROGRESS NOTE ADULT - SUBJECTIVE AND OBJECTIVE BOX
Patient is a 62y old  Male who presents with a chief complaint of resp failure (26 Dec 2022 09:47)    HPI: 62M CAD, Dilated cardiomyopathy, S/p AICD, Afib/flutter, h/o substance abuse, CKD baseline creat approx 1.4 last admitted to [facility] with MSSA bacteremia, and CHF.  Sent from [facility] with acute hypoxic respiratory failure.  Was initially on BiPAP then became hypoxic.  Anesthesia intubated patient.  On post intubation Xray pneumothorax evident.  Patient with PEA arrest.  Chest tube placed by ED physician.  ROSC obtained.     Patient unable to give further history.      Intetrval history -     Intubated on vent.   Responsive to stimuli    MEDICATIONS  (STANDING):    albuterol    90 MICROgram(s) HFA Inhaler 2 Puff(s) Inhalation every 6 hours  albuterol/ipratropium for Nebulization. 3 milliLiter(s) Nebulizer once  aMIOdarone    Tablet 200 milliGRAM(s) Oral daily  aspirin  chewable 81 milliGRAM(s) Oral daily  chlorhexidine 0.12% Liquid 15 milliLiter(s) Oral Mucosa every 12 hours  dextrose 5%. 1000 milliLiter(s) (100 mL/Hr) IV Continuous <Continuous>  dextrose 50% Injectable 25 Gram(s) IV Push once  dextrose 50% Injectable 12.5 Gram(s) IV Push once  dextrose 50% Injectable 25 Gram(s) IV Push once  DOBUTamine Infusion 5 MICROgram(s)/kG/Min (12.2 mL/Hr) IV Continuous <Continuous>  glucagon  Injectable 1 milliGRAM(s) IntraMuscular once  insulin lispro (ADMELOG) corrective regimen sliding scale   SubCutaneous every 6 hours  ipratropium 17 MICROgram(s) HFA Inhaler 1 Puff(s) Inhalation every 6 hours  meropenem  IVPB 500 milliGRAM(s) IV Intermittent every 12 hours  pantoprazole  Injectable 40 milliGRAM(s) IV Push daily  propofol Infusion 10 MICROgram(s)/kG/Min (4.9 mL/Hr) IV Continuous <Continuous>    MEDICATIONS  (PRN):    dextrose Oral Gel 15 Gram(s) Oral once PRN Blood Glucose LESS THAN 70 milliGRAM(s)/deciliter  fentaNYL    Injectable 25 MICROGram(s) IV Push every 2 hours PRN pain or vent synchrony  midazolam Injectable 2 milliGRAM(s) IV Push every 2 hours PRN Agitation    Allergies    No Known Allergies    Intolerances    pork (Other)    REVIEW OF SYSTEMS: UTO    PHYSICAL EXAM:    Vital Signs Last 24 Hrs    T(C): 37.7 (08 Jan 2023 12:01), Max: 37.7 (08 Jan 2023 12:01)  T(F): 99.8 (08 Jan 2023 12:01), Max: 99.8 (08 Jan 2023 12:01)  HR: 107 (08 Jan 2023 12:00) (75 - 111)  BP: 129/95 (08 Jan 2023 12:00) (108/76 - 136/83)  BP(mean): 106 (08 Jan 2023 12:00) (73 - 108)  RR: 19 (08 Jan 2023 12:00) (15 - 28)  SpO2: 95% (08 Jan 2023 12:00) (95% - 100%)    Parameters below as of 08 Jan 2023 12:00  Patient On (Oxygen Delivery Method): ventilator      Parameters below as of 05 Jan 2023 13:36  Patient On (Oxygen Delivery Method): ventilator,30    On Neurological Examination:    Intubated on vent.  On sedation.  Pupils are 3 mm, sluggishly reacting to light.  Neck is supple.  No abn body movements.    LABS:    CBC Full  -  ( 08 Jan 2023 06:34 )  WBC Count : 14.82 K/uL  RBC Count : 3.35 M/uL  Hemoglobin : 10.1 g/dL  Hematocrit : 31.0 %  Platelet Count - Automated : 242 K/uL  Mean Cell Volume : 92.5 fl  Mean Cell Hemoglobin : 30.1 pg  Mean Cell Hemoglobin Concentration : 32.6 gm/dL  Auto Neutrophil # : 12.82 K/uL  Auto Lymphocyte # : 0.53 K/uL  Auto Monocyte # : 1.02 K/uL  Auto Eosinophil # : 0.22 K/uL  Auto Basophil # : 0.04 K/uL  Auto Neutrophil % : 86.4 %  Auto Lymphocyte % : 3.6 %  Auto Monocyte % : 6.9 %  Auto Eosinophil % : 1.5 %  Auto Basophil % : 0.3 %    01-08    144  |  108  |  88<H>  ----------------------------<  114<H>  3.8   |  21<L>  |  3.34<H>    Ca    8.9      08 Jan 2023 06:34  Phos  4.9     01-08  Mg     2.3     01-08    TPro  6.0  /  Alb  2.1<L>  /  TBili  0.8  /  DBili  x   /  AST  50<H>  /  ALT  72<H>  /  AlkPhos  346<H>  01-08    RADIOLOGY & ADDITIONAL STUDIES:    < from: TTE Echo Complete w/o Contrast w/ Doppler (02.13.22 @ 09:00) >    1. Severe left ventricular systolic dysfunction. EF 10 %  2. Dilated left ventricle, left atrium, right atrium.  3. Moderate mitral regurgitation.  4. Moderate tricuspid regurgitation.  5. Mild pulmonary hypertension.  6. No evidence of endocarditis on this transthoracic study.    < end of copied text >    r< from: CT Head No Cont (12.29.22 @ 13:20) >    IMPRESSION: No acute intracranial hemorrhage, mass effect, or shift of the midline structures.    Similar-appearing mild chronic white matter microvascular type changes.    < end of copied text >    < from: CT Head No Cont (12.23.22 @ 21:18) >    IMPRESSION:    No large territory acute infarct, intracranial hemorrhage, or mass effect.    Slight progression of chronic microangiopathic white matter changes as compared to prior study from 2016.    < end of copied text >    < from: CT Chest No Cont (12.23.22 @ 21:19) >    IMPRESSION:    *  Left chest tube with residual small left pneumothorax. No evidence of tension.  *  Right basilar rounded atelectasis again noted with chronic small loculated right pleural effusion.  *  Small pneumomediastinum. Left chest and abdominal wall emphysema. Moderate pneumoretroperitoneum and properitoneal air. No pneumoperitoneum. Findings are compatible with barotrauma.  *  Evidence of urinary bladder cystitis.    < end of copied text >

## 2023-01-08 NOTE — PROGRESS NOTE ADULT - ASSESSMENT
62M CAD, Dilated cardiomyopathy, S/p AICD, Afib/flutter, h/o substance abuse, CKD baseline creat approx 1.4 last admitted to [facility] with MSSA bacteremia, and CHF.  Sent from [facility]F with acute hypoxic respiratory failure/PEA arrest.  Initial CT Head - No acute pathology.  Repeat CT head 12/29 - : No acute intracranial hemorrhage, mass effect, or shift of the midline structures. Similar-appearing mild chronic white matter microvascular type changes.  Pt seem to follow some commands when not seadated. Moes left hand and foot on command on occasion. No movements seen on right side.  MRI Brain when feasible.  HbA1c - 6.4 on 12/24  LDL - 98  - Has Elevated LFTs - Lipitor will not be used at this point.  NIHSS - can't be accessed accurately.  On ASA 81  Renal failure. Cr 4.86  Elevated LFTs.  Severe left ventricular systolic dysfunction. EF 10 %  Pt with multi organ failure. Poor overall prognosis.  Pt is DNR appropriate.  D/w Dr. Nolen.  Would follow.

## 2023-01-08 NOTE — PROGRESS NOTE ADULT - SUBJECTIVE AND OBJECTIVE BOX
[facility] NEPHROLOGY SERVICES, [facility]  NEPHROLOGY AND HYPERTENSION  [address]  [address]  [place] [address]  [phone]    MD FREDY RAWLS MD ANDREY GONCHARUK, MD MADHU KORRAPATI, MD YELENA ROSENBERG, MD BINNY KOSHY, MD CHRISTOPHER CAPUTO, MD EDWARD BOVER, MD          Patient events noted    MEDICATIONS  (STANDING):  albuterol    90 MICROgram(s) HFA Inhaler 2 Puff(s) Inhalation every 6 hours  albuterol/ipratropium for Nebulization. 3 milliLiter(s) Nebulizer once  aMIOdarone    Tablet 200 milliGRAM(s) Oral daily  aspirin  chewable 81 milliGRAM(s) Oral daily  chlorhexidine 0.12% Liquid 15 milliLiter(s) Oral Mucosa every 12 hours  dextrose 5%. 1000 milliLiter(s) (100 mL/Hr) IV Continuous <Continuous>  dextrose 50% Injectable 25 Gram(s) IV Push once  dextrose 50% Injectable 12.5 Gram(s) IV Push once  dextrose 50% Injectable 25 Gram(s) IV Push once  DOBUTamine Infusion 2.5 MICROgram(s)/kG/Min (6.12 mL/Hr) IV Continuous <Continuous>  glucagon  Injectable 1 milliGRAM(s) IntraMuscular once  heparin   Injectable 5000 Unit(s) SubCutaneous every 8 hours  insulin lispro (ADMELOG) corrective regimen sliding scale   SubCutaneous every 6 hours  ipratropium 17 MICROgram(s) HFA Inhaler 1 Puff(s) Inhalation every 6 hours  meropenem  IVPB 500 milliGRAM(s) IV Intermittent every 12 hours  nystatin Powder 1 Application(s) Topical two times a day  pantoprazole  Injectable 40 milliGRAM(s) IV Push daily  propofol Infusion 10 MICROgram(s)/kG/Min (4.9 mL/Hr) IV Continuous <Continuous>    MEDICATIONS  (PRN):  dextrose Oral Gel 15 Gram(s) Oral once PRN Blood Glucose LESS THAN 70 milliGRAM(s)/deciliter  fentaNYL    Injectable 25 MICROGram(s) IV Push every 2 hours PRN pain or vent synchrony  midazolam Injectable 2 milliGRAM(s) IV Push every 2 hours PRN Agitation      01-07-23 @ 07:01  -  01-08-23 @ 07:00  --------------------------------------------------------  IN: 2409 mL / OUT: 1370 mL / NET: 1039 mL    01-08-23 @ 07:01  -  01-08-23 @ 22:25  --------------------------------------------------------  IN: 952.6 mL / OUT: 600 mL / NET: 352.6 mL      PHYSICAL EXAM:      T(C): 36.4 (01-08-23 @ 19:00), Max: 37.7 (01-08-23 @ 12:01)  HR: 109 (01-08-23 @ 21:09) (75 - 117)  BP: 128/98 (01-08-23 @ 21:00) (118/56 - 137/98)  RR: 20 (01-08-23 @ 21:00) (17 - 24)  SpO2: 92% (01-08-23 @ 21:09) (92% - 100%)  Wt(kg): --  Lungs clear  Heart S1S2  Abd soft NT ND  Extremities:   tr edema                                    10.1   14.82 )-----------( 242      ( 08 Jan 2023 06:34 )             31.0     01-08    144  |  108  |  88<H>  ----------------------------<  114<H>  3.8   |  21<L>  |  3.34<H>    Ca    8.9      08 Jan 2023 06:34  Phos  4.9     01-08  Mg     2.3     01-08    TPro  6.0  /  Alb  2.1<L>  /  TBili  0.8  /  DBili  x   /  AST  50<H>  /  ALT  72<H>  /  AlkPhos  346<H>  01-08      LIVER FUNCTIONS - ( 08 Jan 2023 06:34 )  Alb: 2.1 g/dL / Pro: 6.0 g/dL / ALK PHOS: 346 U/L / ALT: 72 U/L DA / AST: 50 U/L / GGT: x           Creatinine Trend: 3.34<--, 3.48<--, 4.86<--, 4.89<--, 5.49<--, 6.35<--  Mode: AC/ CMV (Assist Control/ Continuous Mandatory Ventilation)  RR (machine): 20  TV (machine): 400  FiO2: 35  PEEP: 5  ITime: 1  MAP: 9  PIP: 17      ASSESSMENT:  1.  DERRICK -- ischemic, septic ATN. Peak Cr 6.35. Improving renal indices, UO.   2.  Acute respiratory failure and PEA arrest  3.  Large left sided tension pneumothorax s/p chest tube x 2  4.  Hypernatremia -> hyponatremia, hypervolemic  5.  Trending hypotension - probably cardiogenic in nature given hx of pre existing severe systolic dysfunction and dilated CM    PLAN:  Cont to trend Cr, UO  No immediate dialytic indications  Follow BMP daily.         Paddy Lockhart MD

## 2023-01-08 NOTE — PROGRESS NOTE ADULT - SUBJECTIVE AND OBJECTIVE BOX
Patient is a 62y old  Male who presents with a chief complaint of resp failure (08 Jan 2023 16:42)      BRIEF HOSPITAL COURSE: Pt is a 61 y/o M pmhx of HTN, dilated cardiomyopathy s/p AICD, CKD, opoid/substance abuse, afib/aflutter who presented to  ED w/ complaints of acute hypoxic respiratory failure requiring intubation. Complicated by L sided PTX following intubation, followed by PEA cardiac arrest, chest tube placed emergently in ED w/ ROSC, admitted to SPCU. Course further complicated by ASP PNA, shock and DERRICK on CKD.     Events last 24 hours: Remains in vasopressor dependent shock state on full vent support.     PAST MEDICAL & SURGICAL HISTORY:  Heart failure, systolic      CAD (coronary artery disease)      Hypertension      Nonischemic cardiomyopathy      COPD, moderate      2019 novel coronavirus disease (COVID-19)      Substance abuse      HLD (hyperlipidemia)      Cardiac LV ejection fraction 10-20%      AICD (automatic cardioverter/defibrillator) present      Cardiac LV ejection fraction 10-20%          Review of Systems:  Unable to assess secondary to mentation.     Medications:  meropenem  IVPB 500 milliGRAM(s) IV Intermittent every 12 hours    aMIOdarone    Tablet 200 milliGRAM(s) Oral daily  DOBUTamine Infusion 2.5 MICROgram(s)/kG/Min IV Continuous <Continuous>    albuterol    90 MICROgram(s) HFA Inhaler 2 Puff(s) Inhalation every 6 hours  albuterol/ipratropium for Nebulization. 3 milliLiter(s) Nebulizer once  ipratropium 17 MICROgram(s) HFA Inhaler 1 Puff(s) Inhalation every 6 hours    fentaNYL    Injectable 25 MICROGram(s) IV Push every 2 hours PRN  midazolam Injectable 2 milliGRAM(s) IV Push every 2 hours PRN  propofol Infusion 10 MICROgram(s)/kG/Min IV Continuous <Continuous>      aspirin  chewable 81 milliGRAM(s) Oral daily  heparin   Injectable 5000 Unit(s) SubCutaneous every 8 hours    pantoprazole  Injectable 40 milliGRAM(s) IV Push daily      dextrose 50% Injectable 25 Gram(s) IV Push once  dextrose 50% Injectable 12.5 Gram(s) IV Push once  dextrose 50% Injectable 25 Gram(s) IV Push once  dextrose Oral Gel 15 Gram(s) Oral once PRN  glucagon  Injectable 1 milliGRAM(s) IntraMuscular once  insulin lispro (ADMELOG) corrective regimen sliding scale   SubCutaneous every 6 hours    dextrose 5%. 1000 milliLiter(s) IV Continuous <Continuous>      chlorhexidine 0.12% Liquid 15 milliLiter(s) Oral Mucosa every 12 hours  nystatin Powder 1 Application(s) Topical two times a day        Mode: AC/ CMV (Assist Control/ Continuous Mandatory Ventilation)  RR (machine): 20  TV (machine): 400  FiO2: 35  PEEP: 5  ITime: 1  MAP: 9  PIP: 17      ICU Vital Signs Last 24 Hrs  T(C): 36.4 (08 Jan 2023 19:00), Max: 37.7 (08 Jan 2023 12:01)  T(F): 97.6 (08 Jan 2023 19:00), Max: 99.8 (08 Jan 2023 12:01)  HR: 109 (08 Jan 2023 21:09) (75 - 117)  BP: 128/98 (08 Jan 2023 21:00) (118/56 - 137/98)  BP(mean): 108 (08 Jan 2023 21:00) (73 - 111)  ABP: --  ABP(mean): --  RR: 20 (08 Jan 2023 21:00) (17 - 28)  SpO2: 92% (08 Jan 2023 21:09) (92% - 100%)    O2 Parameters below as of 08 Jan 2023 21:00  Patient On (Oxygen Delivery Method): ventilator    O2 Concentration (%): 35            I&O's Detail    07 Jan 2023 07:01  -  08 Jan 2023 07:00  --------------------------------------------------------  IN:    DOBUTamine: 207.4 mL    DOBUTamine: 42.7 mL    Enteral Tube Flush: 700 mL    IV PiggyBack: 100 mL    Nepro with Carb Steady: 960 mL    Propofol: 398.9 mL  Total IN: 2409 mL    OUT:    Chest Tube (mL): 40 mL    Chest Tube (mL): 30 mL    Indwelling Catheter - Urethral (mL): 1300 mL  Total OUT: 1370 mL    Total NET: 1039 mL      08 Jan 2023 07:01  -  08 Jan 2023 21:20  --------------------------------------------------------  IN:    DOBUTamine: 85.4 mL    Enteral Tube Flush: 200 mL    Nepro with Carb Steady: 560 mL    Propofol: 107.2 mL  Total IN: 952.6 mL    OUT:    Indwelling Catheter - Urethral (mL): 600 mL  Total OUT: 600 mL    Total NET: 352.6 mL            LABS:                        10.1   14.82 )-----------( 242      ( 08 Jan 2023 06:34 )             31.0     01-08    144  |  108  |  88<H>  ----------------------------<  114<H>  3.8   |  21<L>  |  3.34<H>    Ca    8.9      08 Jan 2023 06:34  Phos  4.9     01-08  Mg     2.3     01-08    TPro  6.0  /  Alb  2.1<L>  /  TBili  0.8  /  DBili  x   /  AST  50<H>  /  ALT  72<H>  /  AlkPhos  346<H>  01-08          CAPILLARY BLOOD GLUCOSE      POCT Blood Glucose.: 154 mg/dL (08 Jan 2023 17:42)    PT/INR - ( 08 Jan 2023 06:34 )   PT: 13.8 sec;   INR: 1.20 ratio             CULTURES:      Physical Examination:    General: Pt laying in hospital bed in no acute distress. Sedated on vent w/ precedex.     HEENT: Pupils equal, reactive to light.  Symmetric.    PULM: Clear to auscultation bilaterally, no significant sputum production    CVS: Regular rate and rhythm, no murmurs, rubs, or gallops    ABD: Soft, nondistended, nontender, normoactive bowel sounds, no masses    EXT: No edema, nontender    SKIN: Warm and well perfused.         RADIOLOGY:   < from: Xray Chest 1 View- PORTABLE-Urgent (Xray Chest 1 View- PORTABLE-Urgent .) (01.06.23 @ 16:23) >  ACC: [id number] EXAM:  XR CHEST PORTABLE URGENT 1V                          PROCEDURE DATE:  01/06/2023          INTERPRETATION:  Blank    Portable chest radiograph    CLINICAL INFORMATION: Dyspnea, shortness of breath.    TECHNIQUE:  Portable  AP chest radiograph.    COMPARISON: None. .    FINDINGS:  CATHETERS AND TUBES: LEFT chest tube tip overlies LEFT apex.  ET tube tip above tracheal bifurcation.  NG tube tip beyond GE junction.      PULMONARY: Moderate RIGHT effusion and/or basilar airspaceconsolidation   obscures RIGHT peripheral diaphragm contour.   RIGHT upper zone and LEFT lung parenchyma grossly clear.  .   No pneumothorax.    HEART/VASCULAR: Vascular heartbeat.    BONES: Visualized osseous structures are intact.    IMPRESSION: Nosignificant interval change.  Catheters and tubes in place.  Residual moderate RIGHT effusion and/or basilar airspace consolidation.  Cardiomegaly..    --- End of Report ---            JAYCOB HARDY MD; Attending Radiologist  This document has been electronically signed. Jan 6 2023  5:54PM        CRITICAL CARE TIME SPENT: 41 minutes assessing presenting problems of acute illness, which pose high probability of life threatening deterioration or end organ damage/dysfunction, as well as medical decision making including initiating plan of care, reviewing data, reviewing radiologic exams, discussing with multidisciplinary team,  discussing goals of care with patient/family, and writing this note.  Non-inclusive of procedures performed,

## 2023-01-08 NOTE — PROGRESS NOTE ADULT - SUBJECTIVE AND OBJECTIVE BOX
[facility] DIVISION of INFECTIOUS DISEASE  Amadou Mendez MD PhD, Regina Preston MD, Shila Frazier MD, Indu Rivas MD, Janusz Cheema MD  and providing coverage with Kelley Phan MD  Providing Infectious Disease Consultations at [facility], [facility], [facility], [facility]'s    Office# [phone] to schedule follow up appointments  Answering Service for urgent calls or New Consults [phone]  Cell# to text for urgent issues Amadou Mendez [phone]     infectious diseases progress note:    DOROTHY VOSS is a 62y y. o. Male patient    Overnight and events of the last 24hrs reviewed    Allergies    No Known Allergies    Intolerances    pork (Other)      ANTIBIOTICS/RELEVANT:  antimicrobials  meropenem  IVPB 500 milliGRAM(s) IV Intermittent every 12 hours    immunologic:    OTHER:  albuterol    90 MICROgram(s) HFA Inhaler 2 Puff(s) Inhalation every 6 hours  albuterol/ipratropium for Nebulization. 3 milliLiter(s) Nebulizer once  aMIOdarone    Tablet 200 milliGRAM(s) Oral daily  aspirin  chewable 81 milliGRAM(s) Oral daily  chlorhexidine 0.12% Liquid 15 milliLiter(s) Oral Mucosa every 12 hours  dextrose 5%. 1000 milliLiter(s) IV Continuous <Continuous>  dextrose 50% Injectable 25 Gram(s) IV Push once  dextrose 50% Injectable 12.5 Gram(s) IV Push once  dextrose 50% Injectable 25 Gram(s) IV Push once  dextrose Oral Gel 15 Gram(s) Oral once PRN  DOBUTamine Infusion 2.5 MICROgram(s)/kG/Min IV Continuous <Continuous>  fentaNYL    Injectable 25 MICROGram(s) IV Push every 2 hours PRN  glucagon  Injectable 1 milliGRAM(s) IntraMuscular once  heparin   Injectable 5000 Unit(s) SubCutaneous every 8 hours  insulin lispro (ADMELOG) corrective regimen sliding scale   SubCutaneous every 6 hours  ipratropium 17 MICROgram(s) HFA Inhaler 1 Puff(s) Inhalation every 6 hours  midazolam Injectable 2 milliGRAM(s) IV Push every 2 hours PRN  nystatin Powder 1 Application(s) Topical two times a day  pantoprazole  Injectable 40 milliGRAM(s) IV Push daily  propofol Infusion 10 MICROgram(s)/kG/Min IV Continuous <Continuous>      Objective:  Vital Signs Last 24 Hrs  T(C): 37.7 (08 Jan 2023 12:01), Max: 37.7 (08 Jan 2023 12:01)  T(F): 99.8 (08 Jan 2023 12:01), Max: 99.8 (08 Jan 2023 12:01)  HR: 88 (08 Jan 2023 11:20) (75 - 111)  BP: 133/89 (08 Jan 2023 08:01) (108/76 - 136/83)  BP(mean): 102 (08 Jan 2023 08:01) (73 - 105)  RR: 20 (08 Jan 2023 08:01) (15 - 28)  SpO2: 98% (08 Jan 2023 11:20) (95% - 100%)    Parameters below as of 08 Jan 2023 08:01  Patient On (Oxygen Delivery Method): ventilator    O2 Concentration (%): 35    T(C): 37.7 (01-08-23 @ 12:01), Max: 37.7 (01-08-23 @ 12:01)  T(C): 37.7 (01-08-23 @ 12:01), Max: 37.7 (01-08-23 @ 12:01)  T(C): 37.7 (01-08-23 @ 12:01), Max: 37.7 (01-08-23 @ 12:01)    PHYSICAL EXAM: intubated  HEENT: NC atraumatic  Neck: supple  Respiratory: no accessory muscle use, breathing comfortably  Cardiovascular: distant  Gastrointestinal: normal appearing, nondistended  Extremities: no clubbing, no cyanosis,  Skin: fungal rash in groin    Mode: CPAP with PS, FiO2: 35, PEEP: 5, PS: 10, ITime: 1, MAP: 10, PIP: 16    LABS:                          10.1   14.82 )-----------( 242      ( 08 Jan 2023 06:34 )             31.0       WBC  14.82 01-08 @ 06:34  19.58 01-06 @ 06:01  14.54 01-05 @ 06:17  14.94 01-04 @ 06:43  13.61 01-03 @ 18:54  12.21 01-03 @ 06:00  11.89 01-02 @ 17:00      01-08    144  |  108  |  88<H>  ----------------------------<  114<H>  3.8   |  21<L>  |  3.34<H>    Ca    8.9      08 Jan 2023 06:34  Phos  4.9     01-08  Mg     2.3     01-08    TPro  6.0  /  Alb  2.1<L>  /  TBili  0.8  /  DBili  x   /  AST  50<H>  /  ALT  72<H>  /  AlkPhos  346<H>  01-08      Creatinine, Serum: 3.34 mg/dL (01-08-23 @ 06:34)  Creatinine, Serum: 3.48 mg/dL (01-08-23 @ 00:52)  Creatinine, Serum: 4.86 mg/dL (01-06-23 @ 06:01)  Creatinine, Serum: 4.89 mg/dL (01-06-23 @ 00:47)  Creatinine, Serum: 5.49 mg/dL (01-05-23 @ 06:17)  Creatinine, Serum: 6.35 mg/dL (01-04-23 @ 06:44)  Creatinine, Serum: 5.75 mg/dL (01-03-23 @ 06:00)  Creatinine, Serum: 5.10 mg/dL (01-02-23 @ 17:00)      PT/INR - ( 08 Jan 2023 06:34 )   PT: 13.8 sec;   INR: 1.20 ratio                   INFLAMMATORY MARKERS      MICROBIOLOGY:              RADIOLOGY & ADDITIONAL STUDIES:

## 2023-01-09 NOTE — PROGRESS NOTE ADULT - ASSESSMENT
61 y/o M CAD, Dilated cardiomyopathy, S/p AICD, Afib/flutter, h/o substance abuse,  presented with acute hypoxic and hypercarbic respiratory failure associated with pneumothorax which led to cardiac arrest       Acute respiratory failure secondary to tension pneumothorax with leading to cardiac arrest   - continue vent management;  weaning vs. trach .  ENT aware of poss trach.   - continue chest tubes to suction for now.   -duoneb inhalation     Aspiration pneumonia /Sputum Cx psudomonous    completed zosyn x10 days , on meropenem now     hypernatremia  - resolved    Cardiomyopathy, Acute systolic CHF   - hold eliquis suggested   - on dobutamine drip per cardiolgy  - amiodarone    DM2  - FS monitoring with lispro coverage scale while critically ill    DERRICK on CKD  - Likely ATN/ischemic   - monitor creatinine: slight improved    - avoid nephrotoxic agents  - per renal : no need for dialysis a this point ,    Prophylactic measure  - DVT proph: heparin SQ has been on hold due to bleeding from ET tube.    - GI proph: protonix      Ethics consult appreciated.  SW aware to evaluate for next steps in either trach/peg/possible HD if renal function worsen  vs   comfort care.

## 2023-01-09 NOTE — PROGRESS NOTE ADULT - ASSESSMENT
Pt is a 61 y/o M pmhx of HTN, dilated cardiomyopathy s/p AICD, CKD, opoid/substance abuse, afib/aflutter who presented to  ED w/ complaints of acute hypoxic respiratory failure requiring intubation. Complicated by L sided PTX following intubation, followed by PEA cardiac arrest, chest tube placed emergently in ED w/ ROSC, admitted to SPCU. Course further complicated by ASP PNA, shock and DERRICK on CKD.     1. Sepsis   2. ASP PNA   3. Cardiac arrest   4. Hypoxic respiratory failure   5. L PTX   6. Metabolic encephalopathy   7. DERRICK on CKD   8. Transaminitis   9. VT     Plan:     Neuro: Sedated on vent w/ prop overbreathing vent, PRN fent pushed for compliance.     CV: Remains in shock state on dobutamine for improved inotropic support for improved diuresis. Continue to monitor and titrate based off U/O.     Pulm: Hypoxic respiratory failure secondary to cardiac arrest, Low TV ventilation using 4-6cc/kg of IBW for lung protective strategies, will titrate FiO2 to maintain SpO2>90%. Titrated down to 35%.     GI: Diet: NPO w/ tube feeds.     Renal: DERRICK in setting of shock state, on dobutamine gtt for improved CO and U/O, continue to monitor and maintain >0.5cc/kg/hr.     Endo: Glucose <180 w/ ISS, Mag>2, K>4 for arrythmia suppression.     Heme: Heparin for DVT PPX     ID: Remains on meropenem following increase in WBC and hypothermia. Continue to trend markers of infection.

## 2023-01-09 NOTE — PROGRESS NOTE ADULT - ASSESSMENT
62M CAD, Dilated cardiomyopathy, S/p AICD, Afib/flutter, h/o substance abuse,  presented with acute hypoxic and hypercarbic respiratory failure associated with pneumothorax.      ptx  resp failure  arnoldo hx  CM  AICD  AF    on ABX  ventilated  vs noted  labs reviewed    ethics consult noted - agree with conclusion - prognosis poor and 2 MD certification can designate DNR - Comfort [facility]    no immediate family  friends only on listed number  no decision making capacity from friends  may need guardianship and decision from 2 MD's

## 2023-01-09 NOTE — PROGRESS NOTE ADULT - SUBJECTIVE AND OBJECTIVE BOX
Patient is a 62y old  Male who presents with a chief complaint of resp failure (09 Jan 2023 11:45)      Subjective:  INTERVAL HPI/OVERNIGHT EVENTS: Patient seen and examined at bedside.  Patient sedated  MEDICATIONS  (STANDING):  albuterol    90 MICROgram(s) HFA Inhaler 2 Puff(s) Inhalation every 6 hours  albuterol/ipratropium for Nebulization. 3 milliLiter(s) Nebulizer once  aMIOdarone    Tablet 200 milliGRAM(s) Oral daily  aspirin  chewable 81 milliGRAM(s) Oral daily  chlorhexidine 0.12% Liquid 15 milliLiter(s) Oral Mucosa every 12 hours  dextrose 5%. 1000 milliLiter(s) (100 mL/Hr) IV Continuous <Continuous>  dextrose 50% Injectable 25 Gram(s) IV Push once  dextrose 50% Injectable 12.5 Gram(s) IV Push once  dextrose 50% Injectable 25 Gram(s) IV Push once  DOBUTamine Infusion 5 MICROgram(s)/kG/Min (12.2 mL/Hr) IV Continuous <Continuous>  glucagon  Injectable 1 milliGRAM(s) IntraMuscular once  heparin   Injectable 5000 Unit(s) SubCutaneous every 8 hours  insulin lispro (ADMELOG) corrective regimen sliding scale   SubCutaneous every 6 hours  ipratropium 17 MICROgram(s) HFA Inhaler 1 Puff(s) Inhalation every 6 hours  meropenem  IVPB 500 milliGRAM(s) IV Intermittent every 12 hours  nystatin Powder 1 Application(s) Topical two times a day  pantoprazole  Injectable 40 milliGRAM(s) IV Push daily  propofol Infusion 10 MICROgram(s)/kG/Min (4.9 mL/Hr) IV Continuous <Continuous>    MEDICATIONS  (PRN):  dextrose Oral Gel 15 Gram(s) Oral once PRN Blood Glucose LESS THAN 70 milliGRAM(s)/deciliter  fentaNYL    Injectable 25 MICROGram(s) IV Push every 2 hours PRN pain or vent synchrony  midazolam Injectable 2 milliGRAM(s) IV Push every 2 hours PRN Agitation      Allergies    No Known Allergies    Intolerances    pork (Other)      REVIEW OF SYSTEMS:  not be able to obtain due to sedation and altered mentation       Objective:  Vital Signs Last 24 Hrs  T(C): 36.7 (09 Jan 2023 12:33), Max: 37 (08 Jan 2023 15:33)  T(F): 98 (09 Jan 2023 12:33), Max: 98.6 (08 Jan 2023 15:33)  HR: 97 (09 Jan 2023 11:03) (85 - 118)  BP: 120/87 (09 Jan 2023 08:00) (120/87 - 137/98)  BP(mean): 98 (09 Jan 2023 08:00) (98 - 111)  RR: 15 (09 Jan 2023 08:00) (15 - 24)  SpO2: 97% (09 Jan 2023 11:03) (92% - 100%)    Parameters below as of 09 Jan 2023 07:09  Patient On (Oxygen Delivery Method): ventilator,35        GENERAL: NAD, intubated, sedated   HEAD:  Normocephalic  EYES:  conjunctiva and sclera clear  ENT: Moist mucous membranes  NECK: Supple  CHEST/LUNG: Clear to auscultation bilaterally; +  chest tube   HEART: Regular rate and rhythm; S1S2+  ABDOMEN: Bowel sounds present; Soft  EXTREMITIES:  no limb movement noticed   NERVOUS SYSTEM:  sedated   SKIN: No rashes     LABS:                        11.7   16.61 )-----------( 291      ( 09 Jan 2023 06:46 )             36.7     09 Jan 2023 06:46    142    |  106    |  93     ----------------------------<  202    4.4     |  21     |  3.46     Ca    10.0       09 Jan 2023 06:46  Phos  7.0       09 Jan 2023 06:46  Mg     2.6       09 Jan 2023 06:46      PT/INR - ( 08 Jan 2023 06:34 )   PT: 13.8 sec;   INR: 1.20 ratio             CAPILLARY BLOOD GLUCOSE      POCT Blood Glucose.: 119 mg/dL (09 Jan 2023 11:46)  POCT Blood Glucose.: 135 mg/dL (09 Jan 2023 05:14)  POCT Blood Glucose.: 138 mg/dL (08 Jan 2023 23:40)  POCT Blood Glucose.: 154 mg/dL (08 Jan 2023 17:42)      Consultant(s) Notes Reviewed:  [x] YES  [ ] NO

## 2023-01-09 NOTE — PROGRESS NOTE ADULT - SUBJECTIVE AND OBJECTIVE BOX
DOROTHY VOSS    [facility]    Allergies    No Known Allergies    Intolerances    pork (Other)      PAST MEDICAL & SURGICAL HISTORY:  Heart failure, systolic      CAD (coronary artery disease)      Hypertension      Nonischemic cardiomyopathy      COPD, moderate      2019 novel coronavirus disease (COVID-19)      Substance abuse      HLD (hyperlipidemia)      Cardiac LV ejection fraction 10-20%      AICD (automatic cardioverter/defibrillator) present      Cardiac LV ejection fraction 10-20%          FAMILY HISTORY:  FH: lung cancer        Home Medications:  acetaminophen 325 mg oral tablet: 2 tab(s) orally every 6 hours, As needed, Temp greater or equal to 38C (100.4F), Mild Pain (1 - 3) (23 Dec 2022 10:07)  apixaban 5 mg oral tablet: 1 tab(s) orally every 12 hours (23 Dec 2022 10:07)  Aquaphor Healing topical ointment: Apply topically to affected area once a day (23 Dec 2022 10:07)  ascorbic acid 500 mg oral tablet: 1 tab(s) orally once a day (23 Dec 2022 10:07)  Bacid (LAC) oral capsule: 2 cap(s) orally once a day (23 Dec 2022 10:07)  bumetanide 2 mg oral tablet: 1 tab(s) orally 2 times a day (23 Dec 2022 10:07)  gabapentin 100 mg oral capsule: 1 cap(s) orally 3 times a day (23 Dec 2022 10:07)  melatonin 3 mg oral tablet: 1 tab(s) orally once a day (at bedtime), As needed, Insomnia (23 Dec 2022 10:07)  Multiple Vitamins with Minerals oral tablet: 1 tab(s) orally once a day (23 Dec 2022 10:07)  pantoprazole 40 mg oral delayed release tablet: 1 tab(s) orally once a day (before a meal) (23 Dec 2022 10:07)  sacubitril-valsartan 24 mg-26 mg oral tablet: 1 tab(s) orally 2 times a day (23 Dec 2022 10:07)  simethicone 80 mg oral tablet: 2 tab(s) orally every 6 hours, As Needed (23 Dec 2022 10:07)  spironolactone 25 mg oral tablet: 1 tab(s) orally once a day (23 Dec 2022 10:07)  Symbicort 160 mcg-4.5 mcg/inh inhalation aerosol: 2 puff(s) inhaled 2 times a day (23 Dec 2022 10:07)  Ventolin HFA 90 mcg/inh inhalation aerosol: 2 puff(s) inhaled every 6 hours, As Needed (23 Dec 2022 10:07)      MEDICATIONS  (STANDING):  albuterol    90 MICROgram(s) HFA Inhaler 2 Puff(s) Inhalation every 6 hours  albuterol/ipratropium for Nebulization. 3 milliLiter(s) Nebulizer once  aMIOdarone    Tablet 200 milliGRAM(s) Oral daily  aspirin  chewable 81 milliGRAM(s) Oral daily  chlorhexidine 0.12% Liquid 15 milliLiter(s) Oral Mucosa every 12 hours  dextrose 5%. 1000 milliLiter(s) (100 mL/Hr) IV Continuous <Continuous>  dextrose 50% Injectable 25 Gram(s) IV Push once  dextrose 50% Injectable 12.5 Gram(s) IV Push once  dextrose 50% Injectable 25 Gram(s) IV Push once  DOBUTamine Infusion 5 MICROgram(s)/kG/Min (12.2 mL/Hr) IV Continuous <Continuous>  glucagon  Injectable 1 milliGRAM(s) IntraMuscular once  heparin   Injectable 5000 Unit(s) SubCutaneous every 8 hours  insulin lispro (ADMELOG) corrective regimen sliding scale   SubCutaneous every 6 hours  ipratropium 17 MICROgram(s) HFA Inhaler 1 Puff(s) Inhalation every 6 hours  meropenem  IVPB 500 milliGRAM(s) IV Intermittent every 12 hours  nystatin Powder 1 Application(s) Topical two times a day  pantoprazole  Injectable 40 milliGRAM(s) IV Push daily  propofol Infusion 10 MICROgram(s)/kG/Min (4.9 mL/Hr) IV Continuous <Continuous>    MEDICATIONS  (PRN):  dextrose Oral Gel 15 Gram(s) Oral once PRN Blood Glucose LESS THAN 70 milliGRAM(s)/deciliter  fentaNYL    Injectable 25 MICROGram(s) IV Push every 2 hours PRN pain or vent synchrony  midazolam Injectable 2 milliGRAM(s) IV Push every 2 hours PRN Agitation      Diet, NPO with Tube Feed:   Tube Feeding Modality: Nasogastric  Nepro with Carb Steady  Total Volume for 24 Hours (mL): 960  Continuous  Starting Tube Feed Rate mL per Hour: 20  Increase Tube Feed Rate by (mL): 10     Every 4 hours  Until Goal Tube Feed Rate (mL per Hour): 40  Tube Feed Duration (in Hours): 24  Tube Feed Start Time: 13:00  Free Water Flush  Pump   Rate (mL per Hour): 30 (12-26-22 @ 23:12) [Active]          Vital Signs Last 24 Hrs  T(C): 37.8 (09 Jan 2023 15:00), Max: 37.8 (09 Jan 2023 15:00)  T(F): 100.1 (09 Jan 2023 15:00), Max: 100.1 (09 Jan 2023 15:00)  HR: 99 (09 Jan 2023 17:27) (97 - 118)  BP: 115/80 (09 Jan 2023 16:00) (114/81 - 133/101)  BP(mean): 90 (09 Jan 2023 16:00) (89 - 111)  RR: 17 (09 Jan 2023 16:00) (15 - 24)  SpO2: 100% (09 Jan 2023 17:27) (92% - 100%)    Parameters below as of 09 Jan 2023 13:35  Patient On (Oxygen Delivery Method): ventilator          01-08-23 @ 07:01  -  01-09-23 @ 07:00  --------------------------------------------------------  IN: 1683.3 mL / OUT: 1070 mL / NET: 613.3 mL    01-09-23 @ 07:01  -  01-09-23 @ 17:54  --------------------------------------------------------  IN: 116 mL / OUT: 300 mL / NET: -184 mL        Mode: AC/ CMV (Assist Control/ Continuous Mandatory Ventilation), RR (machine): 20, TV (machine): 400, FiO2: 35, PEEP: 5, ITime: 1, MAP: 11, PIP: 20      LABS:                        11.7   16.61 )-----------( 291      ( 09 Jan 2023 06:46 )             36.7     01-09    142  |  106  |  93<H>  ----------------------------<  202<H>  4.4   |  21<L>  |  3.46<H>    Ca    10.0      09 Jan 2023 06:46  Phos  7.0     01-09  Mg     2.6     01-09    TPro  6.0  /  Alb  2.1<L>  /  TBili  0.8  /  DBili  x   /  AST  50<H>  /  ALT  72<H>  /  AlkPhos  346<H>  01-08    PT/INR - ( 08 Jan 2023 06:34 )   PT: 13.8 sec;   INR: 1.20 ratio                   WBC:  WBC Count: 16.61 K/uL (01-09 @ 06:46)  WBC Count: 14.82 K/uL (01-08 @ 06:34)  WBC Count: 19.58 K/uL (01-06 @ 06:01)      MICROBIOLOGY:  RECENT CULTURES:              PT/INR - ( 08 Jan 2023 06:34 )   PT: 13.8 sec;   INR: 1.20 ratio             Sodium:  Sodium, Serum: 142 mmol/L (01-09 @ 06:46)  Sodium, Serum: 144 mmol/L (01-08 @ 06:34)  Sodium, Serum: 141 mmol/L (01-08 @ 00:52)  Sodium, Serum: 139 mmol/L (01-06 @ 06:01)  Sodium, Serum: 136 mmol/L (01-06 @ 00:47)      3.46 mg/dL 01-09 @ 06:46  3.34 mg/dL 01-08 @ 06:34  3.48 mg/dL 01-08 @ 00:52  4.86 mg/dL 01-06 @ 06:01  4.89 mg/dL 01-06 @ 00:47      Hemoglobin:  Hemoglobin: 11.7 g/dL (01-09 @ 06:46)  Hemoglobin: 10.1 g/dL (01-08 @ 06:34)  Hemoglobin: 10.9 g/dL (01-06 @ 06:01)      Platelets: Platelet Count - Automated: 291 K/uL (01-09 @ 06:46)  Platelet Count - Automated: 242 K/uL (01-08 @ 06:34)  Platelet Count - Automated: 218 K/uL (01-06 @ 06:01)      LIVER FUNCTIONS - ( 08 Jan 2023 06:34 )  Alb: 2.1 g/dL / Pro: 6.0 g/dL / ALK PHOS: 346 U/L / ALT: 72 U/L DA / AST: 50 U/L / GGT: x                 RADIOLOGY & ADDITIONAL STUDIES:      MICROBIOLOGY:  RECENT CULTURES:

## 2023-01-09 NOTE — PROGRESS NOTE ADULT - ASSESSMENT
The patient is a 62 year old male with a history of CAD, chronic systolic heart failure s/p ICD, atrial flutter s/p DCCV, substance abuse, CKD who is admitted with respiratory failure in the setting of tension pneumothorax complicated by cardiac arrest.    Plan:  - ECG with sinus tachycardia and known LBBB  - Echo 2/22 with severely reduced LV (EF 10%) and RV function, mod MR, mod TR, mild pulm HTN  - Not on beta blocker as the patient is on dobutamine. Not a candidate for ACEI/ARB/ARNI at the current time due to renal function.  - Poor renal function - hold apixaban  - Hold bumetanide and spironolactone due to DERRICK  - Continue amiodarone 200 mg daily  - Continue aspirin 81 mg daily  - Renal function improving now  - Increase dobutamine back to 5 mcg/kg/min - will continue at this dose until plans for trach, etc. and attempt to wean after  - Chest tubes in place  - Mechanical ventilation  - ICU care  - Overall poor prognosis. Comfort care would be most appropriate. However, if plan is for continued aggressive care, would evaluate for trach.

## 2023-01-09 NOTE — PROGRESS NOTE ADULT - SUBJECTIVE AND OBJECTIVE BOX
INTERVAL HPI/OVERNIGHT EVENTS:  No new overnight event.  No N/V/D.    MEDICATIONS  (STANDING):  albuterol    90 MICROgram(s) HFA Inhaler 2 Puff(s) Inhalation every 6 hours  albuterol/ipratropium for Nebulization. 3 milliLiter(s) Nebulizer once  aMIOdarone    Tablet 200 milliGRAM(s) Oral daily  aspirin  chewable 81 milliGRAM(s) Oral daily  chlorhexidine 0.12% Liquid 15 milliLiter(s) Oral Mucosa every 12 hours  dextrose 5%. 1000 milliLiter(s) (100 mL/Hr) IV Continuous <Continuous>  dextrose 50% Injectable 25 Gram(s) IV Push once  dextrose 50% Injectable 12.5 Gram(s) IV Push once  dextrose 50% Injectable 25 Gram(s) IV Push once  DOBUTamine Infusion 5 MICROgram(s)/kG/Min (12.2 mL/Hr) IV Continuous <Continuous>  glucagon  Injectable 1 milliGRAM(s) IntraMuscular once  heparin   Injectable 5000 Unit(s) SubCutaneous every 8 hours  insulin lispro (ADMELOG) corrective regimen sliding scale   SubCutaneous every 6 hours  ipratropium 17 MICROgram(s) HFA Inhaler 1 Puff(s) Inhalation every 6 hours  meropenem  IVPB 500 milliGRAM(s) IV Intermittent every 12 hours  nystatin Powder 1 Application(s) Topical two times a day  pantoprazole  Injectable 40 milliGRAM(s) IV Push daily  propofol Infusion 10 MICROgram(s)/kG/Min (4.9 mL/Hr) IV Continuous <Continuous>    MEDICATIONS  (PRN):  dextrose Oral Gel 15 Gram(s) Oral once PRN Blood Glucose LESS THAN 70 milliGRAM(s)/deciliter  fentaNYL    Injectable 25 MICROGram(s) IV Push every 2 hours PRN pain or vent synchrony  midazolam Injectable 2 milliGRAM(s) IV Push every 2 hours PRN Agitation      Allergies    No Known Allergies    Intolerances    pork (Other)      Review of Systems:    General:  No wt loss, fevers, chills, night sweats,fatigue,   Eyes:  Good vision, no reported pain  ENT:  No sore throat, pain, runny nose, dysphagia  CV:  No pain, palpitatioins, hypo/hypertension  Resp:  No dyspnea, cough, tachypnea, wheezing  GI:  No pain, No nausea, No vomiting, No diarrhea, No constipatiion, No weight loss, No fever, No pruritis, No rectal bleeding, No tarry stools, No dysphagia,  :  No pain, bleeding, incontinence, nocturia  Muscle:  No pain, weakness  Neuro:  No weakness, tingling, memory problems  Psych:  No fatigue, insomnia, mood problems, depression  Endocrine:  No polyuria, polydypsia, cold/heat intolerance  Heme:  No petechiae, ecchymosis, easy bruisability  Skin:  No rash, tattoos, scars, edema      Vital Signs Last 24 Hrs  T(C): 36.7 (09 Jan 2023 12:33), Max: 37 (08 Jan 2023 15:33)  T(F): 98 (09 Jan 2023 12:33), Max: 98.6 (08 Jan 2023 15:33)  HR: 97 (09 Jan 2023 11:03) (85 - 118)  BP: 120/87 (09 Jan 2023 08:00) (120/87 - 137/98)  BP(mean): 98 (09 Jan 2023 08:00) (98 - 111)  RR: 15 (09 Jan 2023 08:00) (15 - 24)  SpO2: 97% (09 Jan 2023 11:03) (92% - 100%)    Parameters below as of 09 Jan 2023 07:09  Patient On (Oxygen Delivery Method): ventilator,35        PHYSICAL EXAM:    Constitutional: NAD, well-developed  HEENT: EOMI, throat clear  Neck: No LAD, supple  Respiratory: CTA and P  Cardiovascular: S1 and S2, RRR, no M  Gastrointestinal: BS+, soft, NT/ND, neg HSM,  Extremities: No peripheral edema, neg clubing, cyanosis  Vascular: 2+ peripheral pulses  Neurological: A/O x 3, no focal deficits  Psychiatric: Normal mood, normal affect  Skin: No rashes      LABS:                        11.7   16.61 )-----------( 291      ( 09 Jan 2023 06:46 )             36.7     01-09    142  |  106  |  93<H>  ----------------------------<  202<H>  4.4   |  21<L>  |  3.46<H>    Ca    10.0      09 Jan 2023 06:46  Phos  7.0     01-09  Mg     2.6     01-09    TPro  6.0  /  Alb  2.1<L>  /  TBili  0.8  /  DBili  x   /  AST  50<H>  /  ALT  72<H>  /  AlkPhos  346<H>  01-08    PT/INR - ( 08 Jan 2023 06:34 )   PT: 13.8 sec;   INR: 1.20 ratio               RADIOLOGY & ADDITIONAL TESTS:

## 2023-01-09 NOTE — PROGRESS NOTE ADULT - ASSESSMENT
1.  DERRICK on CKd 3 (Baseline creatinine 1.3-1.5): Ischemic ATN  2.  Acute respiratory failure and PEA arrest  3.  Mild hypernatremia  4.  S/p large left sided tension pneumothorax  5.  Diabetes  6. Cardiomyopathy    Improving renal indices. On IV dobutamine. Continue vent support. To continue current meds. Avoid nephrotoxic meds as possible.   Monitor blood sugar levels. Insulin coverage as needed. Free water with tube feeds. Will follow electrolytes and renal function trend. Overall prognosis poor. Supportive care.

## 2023-01-09 NOTE — PROGRESS NOTE ADULT - SUBJECTIVE AND OBJECTIVE BOX
Date/Time Patient Seen:  		  Referring MD:   Data Reviewed	       Patient is a 62y old  Male who presents with a chief complaint of resp failure (08 Jan 2023 22:25)      Subjective/HPI     PAST MEDICAL & SURGICAL HISTORY:  Heart failure, systolic    CAD (coronary artery disease)    Hypertension    Nonischemic cardiomyopathy    COPD, moderate    2019 novel coronavirus disease (COVID-19)    Substance abuse    HLD (hyperlipidemia)    Cardiac LV ejection fraction 10-20%    No significant past surgical history    AICD (automatic cardioverter/defibrillator) present    Cardiac LV ejection fraction 10-20%          Medication list         MEDICATIONS  (STANDING):  albuterol    90 MICROgram(s) HFA Inhaler 2 Puff(s) Inhalation every 6 hours  albuterol/ipratropium for Nebulization. 3 milliLiter(s) Nebulizer once  aMIOdarone    Tablet 200 milliGRAM(s) Oral daily  aspirin  chewable 81 milliGRAM(s) Oral daily  chlorhexidine 0.12% Liquid 15 milliLiter(s) Oral Mucosa every 12 hours  dextrose 5%. 1000 milliLiter(s) (100 mL/Hr) IV Continuous <Continuous>  dextrose 50% Injectable 25 Gram(s) IV Push once  dextrose 50% Injectable 12.5 Gram(s) IV Push once  dextrose 50% Injectable 25 Gram(s) IV Push once  DOBUTamine Infusion 2.5 MICROgram(s)/kG/Min (6.12 mL/Hr) IV Continuous <Continuous>  glucagon  Injectable 1 milliGRAM(s) IntraMuscular once  heparin   Injectable 5000 Unit(s) SubCutaneous every 8 hours  insulin lispro (ADMELOG) corrective regimen sliding scale   SubCutaneous every 6 hours  ipratropium 17 MICROgram(s) HFA Inhaler 1 Puff(s) Inhalation every 6 hours  meropenem  IVPB 500 milliGRAM(s) IV Intermittent every 12 hours  nystatin Powder 1 Application(s) Topical two times a day  pantoprazole  Injectable 40 milliGRAM(s) IV Push daily  propofol Infusion 10 MICROgram(s)/kG/Min (4.9 mL/Hr) IV Continuous <Continuous>    MEDICATIONS  (PRN):  dextrose Oral Gel 15 Gram(s) Oral once PRN Blood Glucose LESS THAN 70 milliGRAM(s)/deciliter  fentaNYL    Injectable 25 MICROGram(s) IV Push every 2 hours PRN pain or vent synchrony  midazolam Injectable 2 milliGRAM(s) IV Push every 2 hours PRN Agitation         Vitals log        ICU Vital Signs Last 24 Hrs  T(C): 36.9 (09 Jan 2023 03:45), Max: 37.7 (08 Jan 2023 12:01)  T(F): 98.4 (09 Jan 2023 03:45), Max: 99.8 (08 Jan 2023 12:01)  HR: 107 (09 Jan 2023 05:30) (75 - 118)  BP: 128/99 (09 Jan 2023 05:00) (123/96 - 137/98)  BP(mean): 109 (09 Jan 2023 05:00) (98 - 111)  ABP: --  ABP(mean): --  RR: 24 (09 Jan 2023 05:00) (17 - 24)  SpO2: 92% (09 Jan 2023 05:30) (92% - 100%)    O2 Parameters below as of 09 Jan 2023 05:00  Patient On (Oxygen Delivery Method): ventilator    O2 Concentration (%): 35         Mode: AC/ CMV (Assist Control/ Continuous Mandatory Ventilation)  RR (machine): 20  TV (machine): 400  FiO2: 35  PEEP: 5  ITime: 1  MAP: 8  PIP: 17      Input and Output:  I&O's Detail    07 Jan 2023 07:01  -  08 Jan 2023 07:00  --------------------------------------------------------  IN:    DOBUTamine: 207.4 mL    DOBUTamine: 42.7 mL    Enteral Tube Flush: 700 mL    IV PiggyBack: 100 mL    Nepro with Carb Steady: 960 mL    Propofol: 398.9 mL  Total IN: 2409 mL    OUT:    Chest Tube (mL): 40 mL    Chest Tube (mL): 30 mL    Indwelling Catheter - Urethral (mL): 1300 mL  Total OUT: 1370 mL    Total NET: 1039 mL      08 Jan 2023 07:01  -  09 Jan 2023 06:21  --------------------------------------------------------  IN:    DOBUTamine: 134.2 mL    Enteral Tube Flush: 200 mL    IV PiggyBack: 50 mL    Nepro with Carb Steady: 880 mL    Propofol: 158.3 mL  Total IN: 1422.5 mL    OUT:    Chest Tube (mL): 20 mL    Chest Tube (mL): 0 mL    Indwelling Catheter - Urethral (mL): 1050 mL  Total OUT: 1070 mL    Total NET: 352.5 mL          Lab Data                        10.1   14.82 )-----------( 242      ( 08 Jan 2023 06:34 )             31.0     01-08    144  |  108  |  88<H>  ----------------------------<  114<H>  3.8   |  21<L>  |  3.34<H>    Ca    8.9      08 Jan 2023 06:34  Phos  4.9     01-08  Mg     2.3     01-08    TPro  6.0  /  Alb  2.1<L>  /  TBili  0.8  /  DBili  x   /  AST  50<H>  /  ALT  72<H>  /  AlkPhos  346<H>  01-08            Review of Systems	      Objective     Physical Examination    heart s1s2  lung dec bS      Pertinent Lab findings & Imaging      Leo:  NO   Adequate UO     I&O's Detail    07 Jan 2023 07:01  -  08 Jan 2023 07:00  --------------------------------------------------------  IN:    DOBUTamine: 207.4 mL    DOBUTamine: 42.7 mL    Enteral Tube Flush: 700 mL    IV PiggyBack: 100 mL    Nepro with Carb Steady: 960 mL    Propofol: 398.9 mL  Total IN: 2409 mL    OUT:    Chest Tube (mL): 40 mL    Chest Tube (mL): 30 mL    Indwelling Catheter - Urethral (mL): 1300 mL  Total OUT: 1370 mL    Total NET: 1039 mL      08 Jan 2023 07:01  -  09 Jan 2023 06:21  --------------------------------------------------------  IN:    DOBUTamine: 134.2 mL    Enteral Tube Flush: 200 mL    IV PiggyBack: 50 mL    Nepro with Carb Steady: 880 mL    Propofol: 158.3 mL  Total IN: 1422.5 mL    OUT:    Chest Tube (mL): 20 mL    Chest Tube (mL): 0 mL    Indwelling Catheter - Urethral (mL): 1050 mL  Total OUT: 1070 mL    Total NET: 352.5 mL               Discussed with:     Cultures:	        Radiology

## 2023-01-09 NOTE — PROGRESS NOTE ADULT - SUBJECTIVE AND OBJECTIVE BOX
Patient is a 62y old  Male who presents with a chief complaint of resp failure (09 Jan 2023 17:54)      BRIEF HOSPITAL COURSE: Pt is a 61 y/o M pmhx of HTN, dilated cardiomyopathy s/p AICD, CKD, opoid/substance abuse, afib/aflutter who presented to  ED w/ complaints of acute hypoxic respiratory failure requiring intubation. Complicated by L sided PTX following intubation, followed by PEA cardiac arrest, chest tube placed emergently in ED w/ ROSC, admitted to [facility]. Course further complicated by ASP PNA, shock and DERRICK on CKD.     Events last 24 hours: Remains in vasopressor dependent shock state, on full vent support.     PAST MEDICAL & SURGICAL HISTORY:  Heart failure, systolic      CAD (coronary artery disease)      Hypertension      Nonischemic cardiomyopathy      COPD, moderate      2019 novel coronavirus disease (COVID-19)      Substance abuse      HLD (hyperlipidemia)      Cardiac LV ejection fraction 10-20%      AICD (automatic cardioverter/defibrillator) present      Cardiac LV ejection fraction 10-20%          Review of Systems:  Unable to assess secondary to sedation on vent.       Medications:  meropenem  IVPB 500 milliGRAM(s) IV Intermittent every 12 hours    aMIOdarone    Tablet 200 milliGRAM(s) Oral daily  DOBUTamine Infusion 5 MICROgram(s)/kG/Min IV Continuous <Continuous>    albuterol    90 MICROgram(s) HFA Inhaler 2 Puff(s) Inhalation every 6 hours  albuterol/ipratropium for Nebulization. 3 milliLiter(s) Nebulizer once  ipratropium 17 MICROgram(s) HFA Inhaler 1 Puff(s) Inhalation every 6 hours    fentaNYL    Injectable 25 MICROGram(s) IV Push every 2 hours PRN  midazolam Injectable 2 milliGRAM(s) IV Push every 2 hours PRN  propofol Infusion 10 MICROgram(s)/kG/Min IV Continuous <Continuous>      aspirin  chewable 81 milliGRAM(s) Oral daily  heparin   Injectable 5000 Unit(s) SubCutaneous every 8 hours    pantoprazole  Injectable 40 milliGRAM(s) IV Push daily      dextrose 50% Injectable 25 Gram(s) IV Push once  dextrose 50% Injectable 12.5 Gram(s) IV Push once  dextrose 50% Injectable 25 Gram(s) IV Push once  dextrose Oral Gel 15 Gram(s) Oral once PRN  glucagon  Injectable 1 milliGRAM(s) IntraMuscular once  insulin lispro (ADMELOG) corrective regimen sliding scale   SubCutaneous every 6 hours    dextrose 5%. 1000 milliLiter(s) IV Continuous <Continuous>      chlorhexidine 0.12% Liquid 15 milliLiter(s) Oral Mucosa every 12 hours  nystatin Powder 1 Application(s) Topical two times a day        Mode: AC/ CMV (Assist Control/ Continuous Mandatory Ventilation)  RR (machine): 20  TV (machine): 400  FiO2: 35  PEEP: 5  ITime: 1  MAP: 11  PIP: 28      ICU Vital Signs Last 24 Hrs  T(C): 37 (09 Jan 2023 20:00), Max: 37.8 (09 Jan 2023 15:00)  T(F): 98.6 (09 Jan 2023 20:00), Max: 100.1 (09 Jan 2023 15:00)  HR: 95 (09 Jan 2023 21:09) (94 - 118)  BP: 111/77 (09 Jan 2023 20:00) (110/85 - 132/99)  BP(mean): 88 (09 Jan 2023 20:00) (88 - 110)  ABP: --  ABP(mean): --  RR: 27 (09 Jan 2023 20:00) (15 - 27)  SpO2: 98% (09 Jan 2023 21:09) (92% - 100%)    O2 Parameters below as of 09 Jan 2023 20:00  Patient On (Oxygen Delivery Method): ventilator    O2 Concentration (%): 35            I&O's Detail    08 Jan 2023 07:01  -  09 Jan 2023 07:00  --------------------------------------------------------  IN:    DOBUTamine: 140.3 mL    Enteral Tube Flush: 400 mL    IV PiggyBack: 50 mL    Nepro with Carb Steady: 920 mL    Propofol: 173 mL  Total IN: 1683.3 mL    OUT:    Chest Tube (mL): 20 mL    Chest Tube (mL): 0 mL    Indwelling Catheter - Urethral (mL): 1050 mL  Total OUT: 1070 mL    Total NET: 613.3 mL      09 Jan 2023 07:01  -  09 Jan 2023 21:27  --------------------------------------------------------  IN:    DOBUTamine: 74.4 mL    Nepro with Carb Steady: 80 mL    Propofol: 100.4 mL  Total IN: 254.8 mL    OUT:    Chest Tube (mL): 0 mL    Chest Tube (mL): 0 mL    Indwelling Catheter - Urethral (mL): 300 mL  Total OUT: 300 mL    Total NET: -45.2 mL            LABS:                        11.7   16.61 )-----------( 291      ( 09 Jan 2023 06:46 )             36.7     01-09    142  |  106  |  93<H>  ----------------------------<  202<H>  4.4   |  21<L>  |  3.46<H>    Ca    10.0      09 Jan 2023 06:46  Phos  7.0     01-09  Mg     2.6     01-09    TPro  6.0  /  Alb  2.1<L>  /  TBili  0.8  /  DBili  x   /  AST  50<H>  /  ALT  72<H>  /  AlkPhos  346<H>  01-08          CAPILLARY BLOOD GLUCOSE      POCT Blood Glucose.: 115 mg/dL (09 Jan 2023 17:37)    PT/INR - ( 08 Jan 2023 06:34 )   PT: 13.8 sec;   INR: 1.20 ratio             CULTURES:      Physical Examination:    General: Pt laying in hospital bed in no acute distress.  Sedated on vent.     HEENT: Pupils equal, reactive to light.  Symmetric.    PULM: Clear to auscultation bilaterally, no significant sputum production    CVS: Regular rate and rhythm, no murmurs, rubs, or gallops    ABD: Soft, nondistended, nontender, normoactive bowel sounds, no masses    EXT: No edema, nontender    SKIN: Warm and well perfused.        RADIOLOGY:   < from: Xray Chest 1 View- PORTABLE-Routine (Xray Chest 1 View- PORTABLE-Routine in AM.) (01.08.23 @ 07:28) >  ACC: [id number] EXAM:  XR CHEST PORTABLE ROUTINE 1V                          PROCEDURE DATE:  01/08/2023          INTERPRETATION:  INDICATION: Respiratory distress.    COMPARISON: 1/6/23    Technique: AP radiograph of the chest    FINDINGS:  ET tube tip above jennie.  Enteric tube courses below diaphragm -tip is off the film.  Left chest tube overlies left apex.  Left-sided AICD with single electrode overlying right ventricle.  Heart/Vascular: Stable cardiomegaly.  Pulmonary: Mild decrease in right pleural effusion. Persistent right   basilar atelectasis and/or airspace consolidation. Grossly clear left   lung. No pneumothorax.  Bones: No acute bony finding.    Impression:  Catheters and tubes in place.  Mild decrease in right pleural effusion.        --- End of Report ---            ARI WONG MD; Attending Radiologist  This document has been electronically signed. Jan 9 2023  4:56PM          CRITICAL CARE TIME SPENT: 54 minutes assessing presenting problems of acute illness, which pose high probability of life threatening deterioration or end organ damage/dysfunction, as well as medical decision making including initiating plan of care, reviewing data, reviewing radiologic exams, discussing with multidisciplinary team,  discussing goals of care with patient/family, and writing this note.  Non-inclusive of procedures performed,

## 2023-01-09 NOTE — PROGRESS NOTE ADULT - SUBJECTIVE AND OBJECTIVE BOX
DOROTHY VOSS is a 62yMale , patient examined and chart reviewed.     INTERVAL HPI/ OVERNIGHT EVENTS:   Afebrile. Remains Vented sedated.  No events.      PAST MEDICAL & SURGICAL HISTORY:  Heart failure, systolic  CAD (coronary artery disease)  Hypertension  Nonischemic cardiomyopathy  COPD, moderate  2019 novel coronavirus disease (COVID-19)  Substance abuse  HLD (hyperlipidemia)  Cardiac LV ejection fraction 10-20%  AICD (automatic cardioverter/defibrillator) present  Cardiac LV ejection fraction 10-20%        For details regarding the patient's social history, family history, and other miscellaneous elements, please refer the initial infectious diseases consultation and/or the admitting history and physical examination for this admission.    ROS:  Unable to obtain due to : pt's condition    ALLERGIES  pork (Other)      Current inpatient medications :    ANTIBIOTICS/RELEVANT:  meropenem  IVPB 500 milliGRAM(s) IV Intermittent every 12 hours    MEDICATIONS  (STANDING):  albuterol    90 MICROgram(s) HFA Inhaler 2 Puff(s) Inhalation every 6 hours  albuterol/ipratropium for Nebulization. 3 milliLiter(s) Nebulizer once  aMIOdarone    Tablet 200 milliGRAM(s) Oral daily  aspirin  chewable 81 milliGRAM(s) Oral daily  chlorhexidine 0.12% Liquid 15 milliLiter(s) Oral Mucosa every 12 hours  dextrose 5%. 1000 milliLiter(s) (100 mL/Hr) IV Continuous <Continuous>  dextrose 50% Injectable 25 Gram(s) IV Push once  dextrose 50% Injectable 12.5 Gram(s) IV Push once  dextrose 50% Injectable 25 Gram(s) IV Push once  DOBUTamine Infusion 5 MICROgram(s)/kG/Min (12.2 mL/Hr) IV Continuous <Continuous>  glucagon  Injectable 1 milliGRAM(s) IntraMuscular once  heparin   Injectable 5000 Unit(s) SubCutaneous every 8 hours  insulin lispro (ADMELOG) corrective regimen sliding scale   SubCutaneous every 6 hours  ipratropium 17 MICROgram(s) HFA Inhaler 1 Puff(s) Inhalation every 6 hours  nystatin Powder 1 Application(s) Topical two times a day  pantoprazole  Injectable 40 milliGRAM(s) IV Push daily  propofol Infusion 10 MICROgram(s)/kG/Min (4.9 mL/Hr) IV Continuous <Continuous>    MEDICATIONS  (PRN):  dextrose Oral Gel 15 Gram(s) Oral once PRN Blood Glucose LESS THAN 70 milliGRAM(s)/deciliter  fentaNYL    Injectable 25 MICROGram(s) IV Push every 2 hours PRN pain or vent synchrony  midazolam Injectable 2 milliGRAM(s) IV Push every 2 hours PRN Agitation        Objective:  Vital Signs Last 24 Hrs  T(C): 36.8 (06 Jan 2023 22:00), Max: 36.8 (06 Jan 2023 22:00)  T(F): 98.2 (06 Jan 2023 22:00), Max: 98.2 (06 Jan 2023 22:00)  HR: 81 (06 Jan 2023 21:00) (70 - 119)  BP: 132/81 (06 Jan 2023 21:00) (107/91 - 154/108)  BP(mean): 97 (06 Jan 2023 21:00) (93 - 128)  RR: 16 (06 Jan 2023 21:00) (14 - 35)  SpO2: 98% (06 Jan 2023 21:00) (91% - 100%)    Parameters below as of 06 Jan 2023 21:00  Patient On (Oxygen Delivery Method): ventilator    O2 Concentration (%): 30  Mode: AC/ CMV (Assist Control/ Continuous Mandatory Ventilation), RR (machine): 20, TV (machine): 400, FiO2: 30, PEEP: 5, ITime: 1, MAP: 10, PIP: 15    Physical Exam:  General: vented sedated +NGT  Neck: supple, trachea midline  Lungs: decreased no wheeze/rhonchi Left chest tubes x 2  Cardiovascular: regular rate and rhythm, S1 S2  Abdomen: soft, nontender,  bowel sounds normal  Neurological: sedated  Skin: no rash  Extremities: +edema        LABS:                                     11.7   16.61 )-----------( 291      ( 09 Jan 2023 06:46 )             36.7   01-09    142  |  106  |  93<H>  ----------------------------<  202<H>  4.4   |  21<L>  |  3.46<H>    Ca    10.0      09 Jan 2023 06:46  Phos  7.0     01-09  Mg     2.6     01-09    TPro  6.0  /  Alb  2.1<L>  /  TBili  0.8  /  DBili  x   /  AST  50<H>  /  ALT  72<H>  /  AlkPhos  346<H>  01-08        MICROBIOLOGY:  Culture - Sputum . (12.24.22 @ 17:43)    Gram Stain:   Numerous polymorphonuclear leukocytes per low power field  No Squamous epithelial cells per low power field  Rare Gram Positive Rods seen per oil power field    -  Amikacin: S <=16    -  Aztreonam: S 8    -  Cefepime: S <=2    -  Ceftazidime: S 4    -  Ciprofloxacin: S <=0.25    -  Gentamicin: S 4    -  Imipenem: S <=1    -  Levofloxacin: S <=0.5    -  Meropenem: S <=1    -  Piperacillin/Tazobactam: S <=8    -  Tobramycin: S <=2    Specimen Source: ET Tube ET Tube    Culture Results:   Rare Pseudomonas aeruginosa  Normal Respiratory Cathy absent    Organism Identification: Pseudomonas aeruginosa    Organism: Pseudomonas aeruginosa    Method Type: BAY    Culture - Urine (12.23.22 @ 11:16)    -  Tobramycin: S <=2    -  Amoxicillin/Clavulanic Acid: S <=8/4    -  Ampicillin: R 16 These ampicillin results predict results for amoxicillin    -  Ampicillin/Sulbactam: S <=4/2 Enterobacter, Klebsiella aerogenes, Citrobacter, and Serratia may develop resistance during prolonged therapy (3-4 days)    -  Amikacin: S <=16    -  Cefazolin: S <=2    -  Cefoxitin: S <=8    -  Aztreonam: S <=4    -  Cefepime: S <=2    -  Imipenem: S <=1    -  Levofloxacin: S <=0.5    -  Ceftriaxone: S <=1 Enterobacter, Klebsiella aerogenes, Citrobacter, and Serratia may develop resistance during prolonged therapy    -  Ciprofloxacin: S <=0.25    -  Trimethoprim/Sulfamethoxazole: S <=0.5/9.5    -  Meropenem: S <=1    -  Nitrofurantoin: S <=32 Should not be used to treat pyelonephritis    -  Ertapenem: S <=0.5    -  Gentamicin: S <=2    -  Piperacillin/Tazobactam: S <=8    Specimen Source: Clean Catch Clean Catch (Midstream)    Culture Results:   >100,000 CFU/ml Klebsiella oxytoca/Raoultella ornithinolytica    Organism Identification: Klebsiella oxytoca /Raoutella ornithinolytica    Organism: Klebsiella oxytoca /Raoutella ornithinolytica    Method Type: BAY      Culture - Blood (12.23.22 @ 14:06)    Specimen Source: .Blood Blood-Peripheral    Culture Results:   No Growth Final      RADIOLOGY & ADDITIONAL STUDIES:    ACC: [id number] EXAM:  CT CHEST                          PROCEDURE DATE:  12/29/2022          INTERPRETATION:  HISTORY: Admitting Dxs: J96.01 RESP DISTRESS    EXAMINATION: CT CHEST was performed without IV contrast.    COMPARISON: 12/3/2022.    FINDINGS:    AIRWAYS, LUNGS, PLEURA: Satisfactory positioning of endotracheal tube.   Mild central airways secretions. Unchanged small loculated left   pneumothorax. Small chronic loculated right pleural effusion unchanged.   Right basilar and right middlelobe atelectasis. Emphysema.    Left chest tube in place with distal tip along the medial and upper   pleural space.    MEDIASTINUM: Cardiomegaly. No pericardial effusion. Thoracic aorta normal   caliber.  No large mediastinal lymph nodes. ICD lead terminates in the   right ventricle.    IMAGED ABDOMEN: Enteric catheter terminates in the stomach.   Cholelithiasis. Decreased abdominal extraluminal gas.    SOFT TISSUES: Decreased subcutaneous soft tissue emphysema.    BONES: Unremarkable.      IMPRESSION:.    Unchanged small loculated left pneumothorax.    Unchanged small loculated and chronic right pleural effusion.    Decreased subcutaneous soft tissue emphysema and extraluminal abdominal   gas.      ACC: [id number] EXAM:  XR CHEST PORTABLE IMMED 1V                        ACC: [id number] EXAM:  XR CHEST PORTABLE URGENT 1V                        ACC: [id number] EXAM:  XR CHEST PORTABLE URGENT 1V                        ACC: [id number] EXAM:  XR CHEST PORTABLE IMMED 1V                          PROCEDURE DATE:  01/03/2023          INTERPRETATION:  TIME OF EXAM: January 2, 2023 at 11:46 PM and 11:47 PM.    CLINICAL INFORMATION: Status post cardiac arrest. Respiratory distress   and abnormal chest sounds.    COMPARISON:  CT scan of the chest from December 29, 2022.    TECHNIQUE:   AP Portable chest x-ray.    INTERPRETATION:    The cardiac silhouette is enlarged. There is a left chest wall ICD with   intact lead with tip in expected region of the right ventricle. The   generator obscures part of the left lung.  The mediastinum is not accurately evaluated on these images.  ET tube tip is approximately 7.4 cm above the jennie.  Enteric tube tip is near the GE junction with side port in the distal   esophagus.  Left chest tube not significantly changed in position.  No significant change in small loculated right pleural effusion and right   mid and lower lung opacities. Question small pneumothorax associated with   the pleural effusion versus interposed aerated lung. Question tiny right   apical pneumothorax versus apical bulla.  Large left pneumothorax, increased in size. Concern for tension as there   is inversion of the left hemidiaphragm and widening of the rib   interspaces on the left compared to the right. Atelectasis of underlying   lung.  No acute bony abnormality.    AP portable chest x-ray from January 3, 2023 at 12:05 AM and 12:06 AM:    CLINICAL INFORMATION: Pneumothorax.    INTERPRETATION:    ET tube tip is 5.7 cm above the jennie. Enteric tube and left chest tube   unchanged in position.  Large left pneumothorax with concerns for tension, not significantly   changed.  Right-sided findings not significantly changed.    AP portable chest x-ray from January 3, 2023 at 12:53 AM and 12:54 AM:    CLINICAL INFORMATION: Replaced left chest tube.    INTERPRETATION:    ET tube tip is 6.2 cm above the jennie.  Enteric tube tip near GE junction with side port in the distal esophagus.  Left chest tube with tip projecting over the aortic knob.  Large left pneumothorax with concerns for tension, not significantly   changed. Continued atelectasis of underlying lung.  New left subcutaneous emphysema.  Small loculated right pleural effusion with likely associated passive   atelectasis is not significantly changed. Once again a small pneumothorax   component is not excluded. In addition, continued question of minimal   right apical pneumothorax versus apical bulla.    AP portable chest x-ray from January 3, 2023 at 1:35 AM:    CLINICAL INFORMATION: Chest tube.    INTERPRETATION:    ET tube tip approximately 4 cm above the jennie.  Enteric tube tip is likely in the stomach with the side port near the GE   junction.  Left chest wall ICD again seen.  Small loculated right pleural effusion with right mid and lower lung   opacities, not significantly changed. Previous concern for small   associated pneumothorax not seen. Question tiny right apical pneumothorax   versus apical bulla, unchanged.  Left chest tube unchanged in position. Left pneumothorax has resolved.   Left subcutaneous emphysema is slightly decreased. No left pleural   effusion.        IMPRESSION:  Left chest tube unchanged in position on the most recent   image with resolution of left pneumothorax. Slight decrease in left   subcutaneous emphysema.    Enteric tube tip likely in the stomach with side port near the GE   junction. Consider advancing the enteric tube. Discussed with Dr. Dobbins   with read back at 9:03 AM on January 3, 2023 by Dr. Sarmiento.    Small loculated right pleural effusion with right mid and lower lung   opacities, possibly atelectasis, although infection not excluded, not   significantly changed.    Continued question tiny right apical pneumothorax versus apical bulla.      Assessment :  Pt is a 62M CAD, Dilated cardiomyopathy, S/p AICD, Afib/flutter, h/o substance abuse, CKD baseline creat approx 1.4 last admitted to [facility] with MSSA bacteremia, and CHF.    Sent from [facility] with acute hypoxic respiratory failure. Was initially on BiPAP then became hypoxic.  Anesthesia intubated patient.  On post intubation Xray pneumothorax evident.  Patient with PEA arrest.  Chest tube placed by ED physician.  S/p PEA arrest  AHRF requiring intubation  Aspiration PNA   Loculated pleural effusions  - imaging reviewed  - BCx negative   - Sputum Cx -- Pseudomonas  - UCx -- Klebsiella  - WBC stable, afebrile  - s/p zosyn x10d course completed 1/1/2023  - pneumothorax  - WBC up trending and hypothermic- restarted on Antibiotic- Meropenam as there was a concern for recurrent pneumonia    Plan:  - cont Meropenam x 7 days till 1/10/23  - trend temps/WBC--stable  - maintain aspiration precautions  - chest tubes per pulm critical care  - vent management per ICU care  - GOC per primary team  - Overall prog poor      Continue with present regiment.  Appropriate use of antibiotics and adverse effects reviewed.      Critical care > 35 minutes were spent in direct patient care reviewing notes, medications ,labs data/ imaging , discussion with multidisciplinary team.    Thank you for allowing me to participate in care of your patient .    Kelley Phan MD  Infectious Disease  [phone]

## 2023-01-09 NOTE — PROGRESS NOTE ADULT - ASSESSMENT
BIPIN DE LA CRUZ 61 m 2/11/2022 1960 DR KELVIN VANESSA     REVIEW OF SYMPTOMS      Able to give (reliable) ROS  NO     PHYSICAL EXAM    HEENT Unremarkable  atraumatic   RESP Fair air entry EXP prolonged    Harsh breath sound Resp distres mild   CARDIAC S1 S2 No S3     NO JVD    ABDOMEN SOFT BS PRESENT NOT DISTENDED No hepatosplenomegaly   PEDAL EDEMA present No calf tenderness  NO rash       GENERAL DATA .   GOC.   12/23/2022 full code  ALLGY.     nka                  WT.     12/24/2022 81           BMI.       12/24/2022 26          ICU STAY. .. 12/23/2022  COVID. ..  12/23/2022 scv2 (-)   BEST PRACTICE ISSUES.    HOB ELEVATN. Yes  DVT PPLX. ..    12/23/2022 hpsc   WHITMORE PPLX. ..    12/23/2022 protonix 40   INFN PPLX. ..  12/23/2022 chlorhexidine .12%   SP SW VIVIAN.         DIET.  .. 12/26 nepro 960 ng    IV fl...  FREE WATER 1/9/2023 free water 250.3   PROCEDURES...   12/23/2022  l chest tube   12/23/2022 intubated   12/23/2022 reed     PAST PROCEDURES.  .  12/25/2022 glucerna 960 ng .     ABGS.  1/6/2023 vent 30% 739/28/65     VS/ PO/IO/ VENT/ DRIPS.  1/9/2023 afeb 90 110/70   1/9/2023 7p dobu 5 m/k/m   1/9/2023 7p prop 20 m/k/m   1/9/2023 ac 20/400/5/.35      AGE doa cc.  62 m doa 12/23/2022 resp distress    PREV ADMISSION 2/11-2/25/2022 [facility]    PMH  PMH COPD  PMH COVID (+) 2/11/2022   PMH S aureus bacteremia mssa 2/11   .. Ancef 2/12/2022 Dr Phan  PMH AICD  PMH HFREF  .. ECHO 11/20/2021 ef 20%  PMH A fib (on eliquis)     PROBLEMS ASSESSMENT RECOMMENDATIONS.  Intubation 12/23/2022  VENT   .. bundle dsv dsbt ltvv pplat 30 (-) PO2 60 (+) ph 7.3 (+)  .. lung protective ventilatn   .. wean as told if fails then trach  .. failing cpap so far  PROLONGED INTUBATN.  .. Plan trach vs GOC determination   WEANING.  .. 1/8/2023 dw resp pt has lot of secretions   SEDATION.  .. dexm 12/30-1/6  .. prop 1/6 -->   .. target rass 0 to (-) 1   HEMOPTYSIS   .. 1/3 given ddavp 25  .. 5 d meropenem started 1/3   .. 1/5/2023 no further hemoptysis   INFECTION.  .. w 1/6-1/8-1/9/2023 w 19  - 14- 16  .. 1/6/2023 Has leukocytosis   .. 1/3 meroipenem restarted as hemoptysis leukocytosis   Anemia.  .. Hb 1/8/2023 Hb 10   .. target hb 7 (+)   DERRICK   .. Cr 1/2-1/8-1/9/2023 Cr 5.1 - 3.3- 3.4  .. Creat improving   CHEST TUBE   .. 2nd chest tube placed on 1/3 because of worsening pntx   .. to be removed after trach or extubation which ever comes first   CHF.  .. On dobu started 1/3-->   OVERALL.  .. Trach if unweanable   .. Taper off dobu as guided by Dr Zapien   .. renal failure improving   .. complete meropenem   .. goc determination in progress     .     TIME SPENT   Over 39 minutes aggregate critical care time spent on encounter; activities included   direct patient care, counseling and/or coordinating care reviewing notes, lab data/ imaging , discussion with multidisciplinary team/ patient  /family and explaining in detail risks, benefits, alternatives  of the recommendations     BIPIN DE LA CRUZ 61 m 12/23/2022 1960 DR KELVIN VANESSA

## 2023-01-09 NOTE — PROGRESS NOTE ADULT - SUBJECTIVE AND OBJECTIVE BOX
Patient is a 62y old  Male who presents with a chief complaint of resp failure (09 Jan 2023 06:21)    Patient seen in follow up for DERRICK on CKD 4.        PAST MEDICAL HISTORY:  Heart failure, systolic    CAD (coronary artery disease)    Hypertension    Nonischemic cardiomyopathy    COPD, moderate    2019 novel coronavirus disease (COVID-19)    Substance abuse    HLD (hyperlipidemia)    Cardiac LV ejection fraction 10-20%      MEDICATIONS  (STANDING):  albuterol    90 MICROgram(s) HFA Inhaler 2 Puff(s) Inhalation every 6 hours  albuterol/ipratropium for Nebulization. 3 milliLiter(s) Nebulizer once  aMIOdarone    Tablet 200 milliGRAM(s) Oral daily  aspirin  chewable 81 milliGRAM(s) Oral daily  chlorhexidine 0.12% Liquid 15 milliLiter(s) Oral Mucosa every 12 hours  dextrose 5%. 1000 milliLiter(s) (100 mL/Hr) IV Continuous <Continuous>  dextrose 50% Injectable 25 Gram(s) IV Push once  dextrose 50% Injectable 12.5 Gram(s) IV Push once  dextrose 50% Injectable 25 Gram(s) IV Push once  DOBUTamine Infusion 5 MICROgram(s)/kG/Min (12.2 mL/Hr) IV Continuous <Continuous>  glucagon  Injectable 1 milliGRAM(s) IntraMuscular once  heparin   Injectable 5000 Unit(s) SubCutaneous every 8 hours  insulin lispro (ADMELOG) corrective regimen sliding scale   SubCutaneous every 6 hours  ipratropium 17 MICROgram(s) HFA Inhaler 1 Puff(s) Inhalation every 6 hours  meropenem  IVPB 500 milliGRAM(s) IV Intermittent every 12 hours  nystatin Powder 1 Application(s) Topical two times a day  pantoprazole  Injectable 40 milliGRAM(s) IV Push daily  propofol Infusion 10 MICROgram(s)/kG/Min (4.9 mL/Hr) IV Continuous <Continuous>    MEDICATIONS  (PRN):  dextrose Oral Gel 15 Gram(s) Oral once PRN Blood Glucose LESS THAN 70 milliGRAM(s)/deciliter  fentaNYL    Injectable 25 MICROGram(s) IV Push every 2 hours PRN pain or vent synchrony  midazolam Injectable 2 milliGRAM(s) IV Push every 2 hours PRN Agitation    T(C): 36.6 (01-09-23 @ 07:37), Max: 37.7 (01-08-23 @ 12:01)  HR: 97 (01-09-23 @ 11:03) (75 - 118)  BP: 120/87 (01-09-23 @ 08:00) (118/56 - 137/98)  RR: 15 (01-09-23 @ 08:00) (15 - 24)  SpO2: 97% (01-09-23 @ 11:03) (92% - 100%)  Wt(kg): --  I&O's Detail    08 Jan 2023 07:01  -  09 Jan 2023 07:00  --------------------------------------------------------  IN:    DOBUTamine: 140.3 mL    Enteral Tube Flush: 400 mL    IV PiggyBack: 50 mL    Nepro with Carb Steady: 920 mL    Propofol: 168.1 mL  Total IN: 1678.4 mL    OUT:    Chest Tube (mL): 20 mL    Chest Tube (mL): 0 mL    Indwelling Catheter - Urethral (mL): 1050 mL  Total OUT: 1070 mL    Total NET: 608.4 mL          PHYSICAL EXAM:  General: on vent  Respiratory: b/l air entry  Cardiovascular: S1 S2  Gastrointestinal: soft  Extremities:  edema    Mode: AC/ CMV (Assist Control/ Continuous Mandatory Ventilation), RR (machine): 20, TV (machine): 400, FiO2: 35, PEEP: 5, ITime: 1, MAP: 12, PIP: 32                          11.7   16.61 )-----------( 291      ( 09 Jan 2023 06:46 )             36.7     01-09    142  |  106  |  93<H>  ----------------------------<  202<H>  4.4   |  21<L>  |  3.46<H>    Ca    10.0      09 Jan 2023 06:46  Phos  7.0     01-09  Mg     2.6     01-09    TPro  6.0  /  Alb  2.1<L>  /  TBili  0.8  /  DBili  x   /  AST  50<H>  /  ALT  72<H>  /  AlkPhos  346<H>  01-08        LIVER FUNCTIONS - ( 08 Jan 2023 06:34 )  Alb: 2.1 g/dL / Pro: 6.0 g/dL / ALK PHOS: 346 U/L / ALT: 72 U/L DA / AST: 50 U/L / GGT: x               Sodium, Serum: 142 (01-09 @ 06:46)  Sodium, Serum: 144 (01-08 @ 06:34)  Sodium, Serum: 141 (01-08 @ 00:52)  Sodium, Serum: 139 (01-06 @ 06:01)    Creatinine, Serum: 3.46 (01-09 @ 06:46)  Creatinine, Serum: 3.34 (01-08 @ 06:34)  Creatinine, Serum: 3.48 (01-08 @ 00:52)  Creatinine, Serum: 4.86 (01-06 @ 06:01)  Creatinine, Serum: 4.89 (01-06 @ 00:47)    Potassium, Serum: 4.4 (01-09 @ 06:46)  Potassium, Serum: 3.8 (01-08 @ 06:34)  Potassium, Serum: 3.6 (01-08 @ 00:52)  Potassium, Serum: 3.7 (01-06 @ 06:01)    Hemoglobin: 11.7 (01-09 @ 06:46)  Hemoglobin: 10.1 (01-08 @ 06:34)  Hemoglobin: 10.9 (01-06 @ 06:01)

## 2023-01-10 NOTE — PROGRESS NOTE ADULT - SUBJECTIVE AND OBJECTIVE BOX
DOROTHY VOSS    [facility]    Allergies    No Known Allergies    Intolerances    pork (Other)      PAST MEDICAL & SURGICAL HISTORY:  Heart failure, systolic      CAD (coronary artery disease)      Hypertension      Nonischemic cardiomyopathy      COPD, moderate      2019 novel coronavirus disease (COVID-19)      Substance abuse      HLD (hyperlipidemia)      Cardiac LV ejection fraction 10-20%      AICD (automatic cardioverter/defibrillator) present      Cardiac LV ejection fraction 10-20%          FAMILY HISTORY:  FH: lung cancer        Home Medications:  acetaminophen 325 mg oral tablet: 2 tab(s) orally every 6 hours, As needed, Temp greater or equal to 38C (100.4F), Mild Pain (1 - 3) (23 Dec 2022 10:07)  apixaban 5 mg oral tablet: 1 tab(s) orally every 12 hours (23 Dec 2022 10:07)  Aquaphor Healing topical ointment: Apply topically to affected area once a day (23 Dec 2022 10:07)  ascorbic acid 500 mg oral tablet: 1 tab(s) orally once a day (23 Dec 2022 10:07)  Bacid (LAC) oral capsule: 2 cap(s) orally once a day (23 Dec 2022 10:07)  bumetanide 2 mg oral tablet: 1 tab(s) orally 2 times a day (23 Dec 2022 10:07)  gabapentin 100 mg oral capsule: 1 cap(s) orally 3 times a day (23 Dec 2022 10:07)  melatonin 3 mg oral tablet: 1 tab(s) orally once a day (at bedtime), As needed, Insomnia (23 Dec 2022 10:07)  Multiple Vitamins with Minerals oral tablet: 1 tab(s) orally once a day (23 Dec 2022 10:07)  pantoprazole 40 mg oral delayed release tablet: 1 tab(s) orally once a day (before a meal) (23 Dec 2022 10:07)  sacubitril-valsartan 24 mg-26 mg oral tablet: 1 tab(s) orally 2 times a day (23 Dec 2022 10:07)  simethicone 80 mg oral tablet: 2 tab(s) orally every 6 hours, As Needed (23 Dec 2022 10:07)  spironolactone 25 mg oral tablet: 1 tab(s) orally once a day (23 Dec 2022 10:07)  Symbicort 160 mcg-4.5 mcg/inh inhalation aerosol: 2 puff(s) inhaled 2 times a day (23 Dec 2022 10:07)  Ventolin HFA 90 mcg/inh inhalation aerosol: 2 puff(s) inhaled every 6 hours, As Needed (23 Dec 2022 10:07)      MEDICATIONS  (STANDING):  albuterol    90 MICROgram(s) HFA Inhaler 2 Puff(s) Inhalation every 6 hours  albuterol/ipratropium for Nebulization. 3 milliLiter(s) Nebulizer once  aMIOdarone    Tablet 200 milliGRAM(s) Oral daily  aspirin  chewable 81 milliGRAM(s) Oral daily  chlorhexidine 0.12% Liquid 15 milliLiter(s) Oral Mucosa every 12 hours  dextrose 5%. 1000 milliLiter(s) (100 mL/Hr) IV Continuous <Continuous>  dextrose 50% Injectable 25 Gram(s) IV Push once  dextrose 50% Injectable 12.5 Gram(s) IV Push once  dextrose 50% Injectable 25 Gram(s) IV Push once  DOBUTamine Infusion 5 MICROgram(s)/kG/Min (12.2 mL/Hr) IV Continuous <Continuous>  glucagon  Injectable 1 milliGRAM(s) IntraMuscular once  heparin   Injectable 5000 Unit(s) SubCutaneous every 8 hours  insulin lispro (ADMELOG) corrective regimen sliding scale   SubCutaneous every 6 hours  ipratropium 17 MICROgram(s) HFA Inhaler 1 Puff(s) Inhalation every 6 hours  meropenem  IVPB 500 milliGRAM(s) IV Intermittent every 12 hours  nystatin Powder 1 Application(s) Topical two times a day  pantoprazole  Injectable 40 milliGRAM(s) IV Push daily  propofol Infusion 10 MICROgram(s)/kG/Min (4.9 mL/Hr) IV Continuous <Continuous>    MEDICATIONS  (PRN):  dextrose Oral Gel 15 Gram(s) Oral once PRN Blood Glucose LESS THAN 70 milliGRAM(s)/deciliter  fentaNYL    Injectable 25 MICROGram(s) IV Push every 2 hours PRN pain or vent synchrony  midazolam Injectable 2 milliGRAM(s) IV Push every 2 hours PRN Agitation      Diet, NPO with Tube Feed:   Tube Feeding Modality: Nasogastric  Nepro with Carb Steady  Total Volume for 24 Hours (mL): 960  Continuous  Starting Tube Feed Rate mL per Hour: 20  Increase Tube Feed Rate by (mL): 10     Every 4 hours  Until Goal Tube Feed Rate (mL per Hour): 40  Tube Feed Duration (in Hours): 24  Tube Feed Start Time: 13:00  Free Water Flush  Pump   Rate (mL per Hour): 30 (12-26-22 @ 23:12) [Active]          Vital Signs Last 24 Hrs  T(C): 36.9 (10 David 2023 08:00), Max: 37.8 (09 Jan 2023 15:00)  T(F): 98.5 (10 David 2023 08:00), Max: 100.1 (09 Jan 2023 15:00)  HR: 91 (10 David 2023 09:00) (89 - 116)  BP: 116/80 (10 David 2023 09:00) (110/85 - 125/88)  BP(mean): 90 (10 David 2023 09:00) (88 - 101)  RR: 26 (10 David 2023 09:00) (17 - 28)  SpO2: 99% (10 David 2023 09:00) (94% - 100%)    Parameters below as of 10 David 2023 08:00  Patient On (Oxygen Delivery Method): ventilator    O2 Concentration (%): 35      01-09-23 @ 07:01  -  01-10-23 @ 07:00  --------------------------------------------------------  IN: 1077 mL / OUT: 600 mL / NET: 477 mL    01-10-23 @ 07:01  -  01-10-23 @ 10:50  --------------------------------------------------------  IN: 364.2 mL / OUT: 0 mL / NET: 364.2 mL        Mode: AC/ CMV (Assist Control/ Continuous Mandatory Ventilation), RR (machine): 20, TV (machine): 400, FiO2: 35, PEEP: 5, ITime: 1, MAP: 15, PIP: 34      LABS:                        11.7   16.61 )-----------( 291      ( 09 Jan 2023 06:46 )             36.7     01-09    142  |  106  |  93<H>  ----------------------------<  202<H>  4.4   |  21<L>  |  3.46<H>    Ca    10.0      09 Jan 2023 06:46  Phos  7.0     01-09  Mg     2.6     01-09                WBC:  WBC Count: 16.61 K/uL (01-09 @ 06:46)  WBC Count: 14.82 K/uL (01-08 @ 06:34)      MICROBIOLOGY:  RECENT CULTURES:                  Sodium:  Sodium, Serum: 142 mmol/L (01-09 @ 06:46)  Sodium, Serum: 144 mmol/L (01-08 @ 06:34)  Sodium, Serum: 141 mmol/L (01-08 @ 00:52)      3.46 mg/dL 01-09 @ 06:46  3.34 mg/dL 01-08 @ 06:34  3.48 mg/dL 01-08 @ 00:52      Hemoglobin:  Hemoglobin: 11.7 g/dL (01-09 @ 06:46)  Hemoglobin: 10.1 g/dL (01-08 @ 06:34)      Platelets: Platelet Count - Automated: 291 K/uL (01-09 @ 06:46)  Platelet Count - Automated: 242 K/uL (01-08 @ 06:34)              RADIOLOGY & ADDITIONAL STUDIES:      MICROBIOLOGY:  RECENT CULTURES:

## 2023-01-10 NOTE — PROGRESS NOTE ADULT - SUBJECTIVE AND OBJECTIVE BOX
DOROTHY VOSS is a 62yMale , patient examined and chart reviewed.     INTERVAL HPI/ OVERNIGHT EVENTS:   Afebrile. Remains Vented sedated.  Remains critically ill.      PAST MEDICAL & SURGICAL HISTORY:  Heart failure, systolic  CAD (coronary artery disease)  Hypertension  Nonischemic cardiomyopathy  COPD, moderate  2019 novel coronavirus disease (COVID-19)  Substance abuse  HLD (hyperlipidemia)  Cardiac LV ejection fraction 10-20%  AICD (automatic cardioverter/defibrillator) present  Cardiac LV ejection fraction 10-20%        For details regarding the patient's social history, family history, and other miscellaneous elements, please refer the initial infectious diseases consultation and/or the admitting history and physical examination for this admission.    ROS:  Unable to obtain due to : pt's condition    ALLERGIES  pork (Other)      Current inpatient medications :    ANTIBIOTICS/RELEVANT:  meropenem  IVPB 500 milliGRAM(s) IV Intermittent every 12 hours    MEDICATIONS  (STANDING):  albuterol    90 MICROgram(s) HFA Inhaler 2 Puff(s) Inhalation every 6 hours  albuterol/ipratropium for Nebulization. 3 milliLiter(s) Nebulizer once  aMIOdarone    Tablet 200 milliGRAM(s) Oral daily  aspirin  chewable 81 milliGRAM(s) Oral daily  chlorhexidine 0.12% Liquid 15 milliLiter(s) Oral Mucosa every 12 hours  dextrose 5%. 1000 milliLiter(s) (100 mL/Hr) IV Continuous <Continuous>  dextrose 50% Injectable 25 Gram(s) IV Push once  dextrose 50% Injectable 12.5 Gram(s) IV Push once  dextrose 50% Injectable 25 Gram(s) IV Push once  DOBUTamine Infusion 5 MICROgram(s)/kG/Min (12.2 mL/Hr) IV Continuous <Continuous>  glucagon  Injectable 1 milliGRAM(s) IntraMuscular once  heparin   Injectable 5000 Unit(s) SubCutaneous every 8 hours  insulin lispro (ADMELOG) corrective regimen sliding scale   SubCutaneous every 6 hours  ipratropium 17 MICROgram(s) HFA Inhaler 1 Puff(s) Inhalation every 6 hours  nystatin Powder 1 Application(s) Topical two times a day  pantoprazole  Injectable 40 milliGRAM(s) IV Push daily  propofol Infusion 10 MICROgram(s)/kG/Min (4.9 mL/Hr) IV Continuous <Continuous>    MEDICATIONS  (PRN):  dextrose Oral Gel 15 Gram(s) Oral once PRN Blood Glucose LESS THAN 70 milliGRAM(s)/deciliter  fentaNYL    Injectable 25 MICROGram(s) IV Push every 2 hours PRN pain or vent synchrony  midazolam Injectable 2 milliGRAM(s) IV Push every 2 hours PRN Agitation          Objective:  Vital Signs Last 24 Hrs  T(C): 37 (10 David 2023 21:14), Max: 37.1 (10 David 2023 12:00)  T(F): 98.6 (10 David 2023 21:14), Max: 98.8 (10 David 2023 12:00)  HR: 104 (10 David 2023 20:00) (90 - 104)  BP: 118/90 (10 David 2023 20:00) (116/78 - 133/96)  BP(mean): 98 (10 David 2023 20:00) (90 - 106)  RR: 20 (10 David 2023 20:00) (16 - 30)  SpO2: 96% (10 David 2023 20:00) (91% - 100%)    Parameters below as of 10 David 2023 20:00  Patient On (Oxygen Delivery Method): ventilator    O2 Concentration (%): 35  Mode: AC/ CMV (Assist Control/ Continuous Mandatory Ventilation), RR (machine): 20, TV (machine): 400, FiO2: 35, PEEP: 5, ITime: 1, MAP: 10, PIP: 15    Physical Exam:  General: vented sedated +NGT  Neck: supple, trachea midline  Lungs: decreased no wheeze/rhonchi Left chest tubes x 2  Cardiovascular: regular rate and rhythm, S1 S2  Abdomen: soft, nontender,  bowel sounds normal  Neurological: sedated  Skin: no rash  Extremities: +edema        LABS:                                     11.7   16.61 )-----------( 291      ( 09 Jan 2023 06:46 )             36.7   01-09    142  |  106  |  93<H>  ----------------------------<  202<H>  4.4   |  21<L>  |  3.46<H>    Ca    10.0      09 Jan 2023 06:46  Phos  7.0     01-09  Mg     2.6     01-09    TPro  6.0  /  Alb  2.1<L>  /  TBili  0.8  /  DBili  x   /  AST  50<H>  /  ALT  72<H>  /  AlkPhos  346<H>  01-08        MICROBIOLOGY:  Culture - Sputum . (12.24.22 @ 17:43)    Gram Stain:   Numerous polymorphonuclear leukocytes per low power field  No Squamous epithelial cells per low power field  Rare Gram Positive Rods seen per oil power field    -  Amikacin: S <=16    -  Aztreonam: S 8    -  Cefepime: S <=2    -  Ceftazidime: S 4    -  Ciprofloxacin: S <=0.25    -  Gentamicin: S 4    -  Imipenem: S <=1    -  Levofloxacin: S <=0.5    -  Meropenem: S <=1    -  Piperacillin/Tazobactam: S <=8    -  Tobramycin: S <=2    Specimen Source: ET Tube ET Tube    Culture Results:   Rare Pseudomonas aeruginosa  Normal Respiratory Cathy absent    Organism Identification: Pseudomonas aeruginosa    Organism: Pseudomonas aeruginosa    Method Type: BAY    Culture - Urine (12.23.22 @ 11:16)    -  Tobramycin: S <=2    -  Amoxicillin/Clavulanic Acid: S <=8/4    -  Ampicillin: R 16 These ampicillin results predict results for amoxicillin    -  Ampicillin/Sulbactam: S <=4/2 Enterobacter, Klebsiella aerogenes, Citrobacter, and Serratia may develop resistance during prolonged therapy (3-4 days)    -  Amikacin: S <=16    -  Cefazolin: S <=2    -  Cefoxitin: S <=8    -  Aztreonam: S <=4    -  Cefepime: S <=2    -  Imipenem: S <=1    -  Levofloxacin: S <=0.5    -  Ceftriaxone: S <=1 Enterobacter, Klebsiella aerogenes, Citrobacter, and Serratia may develop resistance during prolonged therapy    -  Ciprofloxacin: S <=0.25    -  Trimethoprim/Sulfamethoxazole: S <=0.5/9.5    -  Meropenem: S <=1    -  Nitrofurantoin: S <=32 Should not be used to treat pyelonephritis    -  Ertapenem: S <=0.5    -  Gentamicin: S <=2    -  Piperacillin/Tazobactam: S <=8    Specimen Source: Clean Catch Clean Catch (Midstream)    Culture Results:   >100,000 CFU/ml Klebsiella oxytoca/Raoultella ornithinolytica    Organism Identification: Klebsiella oxytoca /Raoutella ornithinolytica    Organism: Klebsiella oxytoca /Raoutella ornithinolytica    Method Type: BAY      Culture - Blood (12.23.22 @ 14:06)    Specimen Source: .Blood Blood-Peripheral    Culture Results:   No Growth Final      RADIOLOGY & ADDITIONAL STUDIES:    ACC: [id number] EXAM:  CT CHEST                          PROCEDURE DATE:  12/29/2022          INTERPRETATION:  HISTORY: Admitting Dxs: J96.01 RESP DISTRESS    EXAMINATION: CT CHEST was performed without IV contrast.    COMPARISON: 12/3/2022.    FINDINGS:    AIRWAYS, LUNGS, PLEURA: Satisfactory positioning of endotracheal tube.   Mild central airways secretions. Unchanged small loculated left   pneumothorax. Small chronic loculated right pleural effusion unchanged.   Right basilar and right middlelobe atelectasis. Emphysema.    Left chest tube in place with distal tip along the medial and upper   pleural space.    MEDIASTINUM: Cardiomegaly. No pericardial effusion. Thoracic aorta normal   caliber.  No large mediastinal lymph nodes. ICD lead terminates in the   right ventricle.    IMAGED ABDOMEN: Enteric catheter terminates in the stomach.   Cholelithiasis. Decreased abdominal extraluminal gas.    SOFT TISSUES: Decreased subcutaneous soft tissue emphysema.    BONES: Unremarkable.      IMPRESSION:.    Unchanged small loculated left pneumothorax.    Unchanged small loculated and chronic right pleural effusion.    Decreased subcutaneous soft tissue emphysema and extraluminal abdominal   gas.      ACC: [id number] EXAM:  XR CHEST PORTABLE IMMED 1V                        ACC: [id number] EXAM:  XR CHEST PORTABLE URGENT 1V                        ACC: [id number] EXAM:  XR CHEST PORTABLE URGENT 1V                        ACC: [id number] EXAM:  XR CHEST PORTABLE IMMED 1V                          PROCEDURE DATE:  01/03/2023          INTERPRETATION:  TIME OF EXAM: January 2, 2023 at 11:46 PM and 11:47 PM.    CLINICAL INFORMATION: Status post cardiac arrest. Respiratory distress   and abnormal chest sounds.    COMPARISON:  CT scan of the chest from December 29, 2022.    TECHNIQUE:   AP Portable chest x-ray.    INTERPRETATION:    The cardiac silhouette is enlarged. There is a left chest wall ICD with   intact lead with tip in expected region of the right ventricle. The   generator obscures part of the left lung.  The mediastinum is not accurately evaluated on these images.  ET tube tip is approximately 7.4 cm above the jennie.  Enteric tube tip is near the GE junction with side port in the distal   esophagus.  Left chest tube not significantly changed in position.  No significant change in small loculated right pleural effusion and right   mid and lower lung opacities. Question small pneumothorax associated with   the pleural effusion versus interposed aerated lung. Question tiny right   apical pneumothorax versus apical bulla.  Large left pneumothorax, increased in size. Concern for tension as there   is inversion of the left hemidiaphragm and widening of the rib   interspaces on the left compared to the right. Atelectasis of underlying   lung.  No acute bony abnormality.    AP portable chest x-ray from January 3, 2023 at 12:05 AM and 12:06 AM:    CLINICAL INFORMATION: Pneumothorax.    INTERPRETATION:    ET tube tip is 5.7 cm above the jennie. Enteric tube and left chest tube   unchanged in position.  Large left pneumothorax with concerns for tension, not significantly   changed.  Right-sided findings not significantly changed.    AP portable chest x-ray from January 3, 2023 at 12:53 AM and 12:54 AM:    CLINICAL INFORMATION: Replaced left chest tube.    INTERPRETATION:    ET tube tip is 6.2 cm above the jennie.  Enteric tube tip near GE junction with side port in the distal esophagus.  Left chest tube with tip projecting over the aortic knob.  Large left pneumothorax with concerns for tension, not significantly   changed. Continued atelectasis of underlying lung.  New left subcutaneous emphysema.  Small loculated right pleural effusion with likely associated passive   atelectasis is not significantly changed. Once again a small pneumothorax   component is not excluded. In addition, continued question of minimal   right apical pneumothorax versus apical bulla.    AP portable chest x-ray from January 3, 2023 at 1:35 AM:    CLINICAL INFORMATION: Chest tube.    INTERPRETATION:    ET tube tip approximately 4 cm above the jennie.  Enteric tube tip is likely in the stomach with the side port near the GE   junction.  Left chest wall ICD again seen.  Small loculated right pleural effusion with right mid and lower lung   opacities, not significantly changed. Previous concern for small   associated pneumothorax not seen. Question tiny right apical pneumothorax   versus apical bulla, unchanged.  Left chest tube unchanged in position. Left pneumothorax has resolved.   Left subcutaneous emphysema is slightly decreased. No left pleural   effusion.        IMPRESSION:  Left chest tube unchanged in position on the most recent   image with resolution of left pneumothorax. Slight decrease in left   subcutaneous emphysema.    Enteric tube tip likely in the stomach with side port near the GE   junction. Consider advancing the enteric tube. Discussed with Dr. Dobbins   with read back at 9:03 AM on January 3, 2023 by Dr. Sarmiento.    Small loculated right pleural effusion with right mid and lower lung   opacities, possibly atelectasis, although infection not excluded, not   significantly changed.    Continued question tiny right apical pneumothorax versus apical bulla.      Assessment :  Pt is a 62M CAD, Dilated cardiomyopathy, S/p AICD, Afib/flutter, h/o substance abuse, CKD baseline creat approx 1.4 last admitted to [facility] with MSSA bacteremia, and CHF.    Sent from [facility] with acute hypoxic respiratory failure. Was initially on BiPAP then became hypoxic.  Anesthesia intubated patient.  On post intubation Xray pneumothorax evident.  Patient with PEA arrest.  Chest tube placed by ED physician.  S/p PEA arrest  AHRF requiring intubation  Aspiration PNA   Loculated pleural effusions  - imaging reviewed  - BCx negative   - Sputum Cx -- Pseudomonas  - UCx -- Klebsiella  - WBC stable, afebrile  - s/p zosyn x10d course completed 1/1/2023  - pneumothorax  - WBC up trending and hypothermic- restarted on Antibiotic- Meropenam as there was a concern for recurrent pneumonia    Plan:  - cont Meropenam x 7 days till 1/10/23  - trend temps/WBC--stable  - maintain aspiration precautions  - chest tubes per pulm critical care  - vent management per ICU care  - GOC per primary team  - Overall prog poor      Continue with present regiment.  Appropriate use of antibiotics and adverse effects reviewed.      Critical care > 35 minutes were spent in direct patient care reviewing notes, medications ,labs data/ imaging , discussion with multidisciplinary team.    Thank you for allowing me to participate in care of your patient .    Kelley Phan MD  Infectious Disease  [phone]

## 2023-01-10 NOTE — PROGRESS NOTE ADULT - ASSESSMENT
REVIEW OF SYMPTOMS      Able to give (reliable) ROS  NO     PHYSICAL EXAM    HEENT Unremarkable  atraumatic   RESP Fair air entry EXP prolonged    Harsh breath sound Resp distres mild   CARDIAC S1 S2 No S3     NO JVD    ABDOMEN SOFT BS PRESENT NOT DISTENDED No hepatosplenomegaly   PEDAL EDEMA present No calf tenderness  NO rash       GENERAL DATA .   GOC.   12/23/2022 full code  ALLGY.     nka                  WT.     12/24/2022 81           BMI.       12/24/2022 26          ICU STAY. .. 12/23/2022  COVID. ..  12/23/2022 scv2 (-)   BEST PRACTICE ISSUES.    HOB ELEVATN. Yes  DVT PPLX. ..    12/23/2022 hpsc   WHITMORE PPLX. ..    12/23/2022 protonix 40   INFN PPLX. ..  12/23/2022 chlorhexidine .12%   SP SW VIVIAN.         DIET.  .. 12/26 nepro 960 ng    IV fl...  FREE WATER 1/9/2023 free water 250.3   PROCEDURES...   12/23/2022  l chest tube   12/23/2022 intubated   12/23/2022 reed       ABGS.  1/6/2023 vent 30% 739/28/65     VS/ PO/IO/ VENT/ DRIPS.  1/10/2023 98 118/80   1/10/2023 4p dobu 5 m/k/m   1/10/2023 4p prop 10 m/k/m  1/10/2023 ac 20/400/5/.35      AGE doa cc.  62 m doa 12/23/2022 resp distress    PREV ADMISSION 2/11-2/25/2022 [facility]    PMH  PMH COPD  PMH COVID (+) 2/11/2022   PMH S aureus bacteremia mssa 2/11   .. Ancef 2/12/2022 Dr Phan  PM AICD  PMH HFREF  .. ECHO 11/20/2021 ef 20%  PMH A fib (on eliquis)     PROBLEMS ASSESSMENT RECOMMENDATIONS.  VENT   .. bundle dsv dsbt ltvv pplat 30 (-) PO2 60 (+) ph 7.3 (+)  .. lung protective ventilatn   .. wean as told if fails then trach  PROLONGED INTUBATN.  .. Plan trach vs GOC determination   WEANING  .. If tolerates cpap will plan extrubation 1/9a   SEDATION.  .. dexm 12/30-1/6  .. prop 1/6 -->   .. target rass 0 to (-) 1   HEMOPTYSIS   .. 1/3 given ddavp 25  .. 5 d meropenem started 1/3   .. 1/5/2023 no further hemoptysis   INFECTION.  .. w 1/6-1/9/2023 w 19  - 16  .. 1/6/2023 Has leukocytosis   MICRO  .. mrsa 12/23 (+)   .. 12/24 et pseudo  .. 12/23  klebsiella   ANTIBIO.  .. 12/23-1/1 zosyn completed  .. 1/3 meroipenem restarted by ID as leukocytosis   .. 1/3 meroipenem started as hemoptysis leukocytosi   DERRICK   .. Cr 1/2-1/9/2023 Cr 5.1 - 3.4  .. HD if decided by renal   .. Creat improving   CHEST TUBE   .. 2nd chest tube placed on 1/3 because of worsening pntx   .. to be removed after trach or extubation which ever comes first   CHF.  .. ECHO 11/20/2021 ef 20%  .. On dobu started 1/3-->   NEURO  .. 1/10/2023 Not much progress Opens eyes some movement in lue   OVERALL.  .. GOC determination       TIME SPENT   Over 39 minutes aggregate critical care time spent on encounter; activities included   direct patient care, counseling and/or coordinating care reviewing notes, lab data/ imaging , discussion with multidisciplinary team/ patient  /family and explaining in detail risks, benefits, alternatives  of the recommendations     BIPIN DE LA CRUZ 61 m 12/23/2022 1962 DR KELVIN VANESSA

## 2023-01-10 NOTE — PROGRESS NOTE ADULT - SUBJECTIVE AND OBJECTIVE BOX
Chief Complaint: Respiratory failure    Interval Events: No events overnight.    Review of Systems:  Unable to obtain    Physical Exam:  Vital Signs Last 24 Hrs  T(C): 36.9 (10 David 2023 08:00), Max: 37.8 (09 Jan 2023 15:00)  T(F): 98.5 (10 David 2023 08:00), Max: 100.1 (09 Jan 2023 15:00)  HR: 91 (10 David 2023 09:00) (89 - 116)  BP: 116/80 (10 David 2023 09:00) (110/85 - 123/89)  BP(mean): 90 (10 David 2023 09:00) (88 - 101)  RR: 26 (10 David 2023 09:00) (17 - 28)  SpO2: 99% (10 David 2023 09:00) (94% - 100%)  Parameters below as of 10 David 2023 08:00  Patient On (Oxygen Delivery Method): ventilator  O2 Concentration (%): 35  General: Sedated  HEENT: Intubated  Neck: No JVD, no carotid bruit  Lungs: CTAB  CV: RRR, nl S1/S2, no M/R/G  Abdomen: S/NT/ND, +BS  Extremities: No LE edema, no cyanosis  Neuro: AAOx0  Skin: No rash    Labs:    01-09    142  |  106  |  93<H>  ----------------------------<  202<H>  4.4   |  21<L>  |  3.46<H>    Ca    10.0      09 Jan 2023 06:46  Phos  7.0     01-09  Mg     2.6     01-09                          11.7   16.61 )-----------( 291      ( 09 Jan 2023 06:46 )             36.7     ECG/Telemetry: Sinus rhythm

## 2023-01-10 NOTE — PROGRESS NOTE ADULT - ASSESSMENT
62M CAD, Dilated cardiomyopathy, S/p AICD, Afib/flutter, h/o substance abuse, CKD baseline creat approx 1.4 last admitted to [facility] with MSSA bacteremia, and CHF.  Sent from [facility] with acute hypoxic respiratory failure/PEA arrest.  Initial CT Head - No acute pathology.  Repeat CT head 12/29 - : No acute intracranial hemorrhage, mass effect, or shift of the midline structures. Similar-appearing mild chronic white matter microvascular type changes.  Pt seem to follow some commands when not seadated. Moes left hand and foot on command on occasion. No movements seen on right side.  MRI Brain when feasible.  HbA1c - 6.4 on 12/24  LDL - 98  - Has Elevated LFTs - Lipitor will not be used at this point.  NIHSS - can't be accessed accurately.  On ASA 81  Renal failure. Cr 3.46 now  Elevated LFTs.  Severe left ventricular systolic dysfunction. EF 10 %  Pt with multi organ failure. Poor overall prognosis.  Would follow.

## 2023-01-10 NOTE — PROGRESS NOTE ADULT - SUBJECTIVE AND OBJECTIVE BOX
Patient is a 62y old  Male who presents with a chief complaint of resp failure (26 Dec 2022 09:47)    HPI: 62M CAD, Dilated cardiomyopathy, S/p AICD, Afib/flutter, h/o substance abuse, CKD baseline creat approx 1.4 last admitted to [facility] with MSSA bacteremia, and CHF.  Sent from [facility] with acute hypoxic respiratory failure.  Was initially on BiPAP then became hypoxic.  Anesthesia intubated patient.  On post intubation Xray pneumothorax evident.  Patient with PEA arrest.  Chest tube placed by ED physician.  ROSC obtained.     Patient unable to give further history.      Intetrval history -     Intubated on vent.   Responsive to stimuli  Doesn't make eye contact.    MEDICATIONS  (STANDING):    albuterol    90 MICROgram(s) HFA Inhaler 2 Puff(s) Inhalation every 6 hours  albuterol/ipratropium for Nebulization. 3 milliLiter(s) Nebulizer once  aMIOdarone    Tablet 200 milliGRAM(s) Oral daily  aspirin  chewable 81 milliGRAM(s) Oral daily  chlorhexidine 0.12% Liquid 15 milliLiter(s) Oral Mucosa every 12 hours  dextrose 5%. 1000 milliLiter(s) (100 mL/Hr) IV Continuous <Continuous>  dextrose 50% Injectable 25 Gram(s) IV Push once  dextrose 50% Injectable 12.5 Gram(s) IV Push once  dextrose 50% Injectable 25 Gram(s) IV Push once  DOBUTamine Infusion 5 MICROgram(s)/kG/Min (12.2 mL/Hr) IV Continuous <Continuous>  glucagon  Injectable 1 milliGRAM(s) IntraMuscular once  heparin   Injectable 5000 Unit(s) SubCutaneous every 8 hours  insulin lispro (ADMELOG) corrective regimen sliding scale   SubCutaneous every 6 hours  ipratropium 17 MICROgram(s) HFA Inhaler 1 Puff(s) Inhalation every 6 hours  nystatin Powder 1 Application(s) Topical two times a day  pantoprazole  Injectable 40 milliGRAM(s) IV Push daily  propofol Infusion 10 MICROgram(s)/kG/Min (4.9 mL/Hr) IV Continuous <Continuous>    MEDICATIONS  (PRN):    dextrose Oral Gel 15 Gram(s) Oral once PRN Blood Glucose LESS THAN 70 milliGRAM(s)/deciliter  fentaNYL    Injectable 25 MICROGram(s) IV Push every 2 hours PRN pain or vent synchrony  midazolam Injectable 2 milliGRAM(s) IV Push every 2 hours PRN Agitation    Allergies    No Known Allergies    Intolerances    pork (Other)    REVIEW OF SYSTEMS: UTO    PHYSICAL EXAM:    Vital Signs Last 24 Hrs    T(C): 36.9 (01-10-23 @ 15:50), Max: 37.1 (01-10-23 @ 12:00)  T(F): 98.4 (01-10-23 @ 15:50), Max: 98.8 (01-10-23 @ 12:00)  HR: 97 (01-10-23 @ 18:00) (89 - 100)  BP: 118/85 (01-10-23 @ 18:00) (111/77 - 127/89)  BP(mean): 95 (01-10-23 @ 18:00) (88 - 100)  RR: 20 (01-10-23 @ 18:00) (16 - 28)  SpO2: 98% (01-10-23 @ 18:00) (97% - 100%)    On Neurological Examination:    Intubated on vent.  On sedation.  Opens eyes on stimulation. Doesn't make eye contact.   Pupils are 3 mm, sluggishly reacting to light.  Neck is supple.  No abn body movements.    LABS:    CBC Full  -  ( 09 Jan 2023 06:46 )  WBC Count : 16.61 K/uL  RBC Count : 3.87 M/uL  Hemoglobin : 11.7 g/dL  Hematocrit : 36.7 %  Platelet Count - Automated : 291 K/uL  Mean Cell Volume : 94.8 fl  Mean Cell Hemoglobin : 30.2 pg  Mean Cell Hemoglobin Concentration : 31.9 gm/dL    01-09    142  |  106  |  93<H>  ----------------------------<  202<H>  4.4   |  21<L>  |  3.46<H>    Ca    10.0      09 Jan 2023 06:46  Phos  7.0     01-09  Mg     2.6     01-09    RADIOLOGY & ADDITIONAL STUDIES:    < from: TTE Echo Complete w/o Contrast w/ Doppler (02.13.22 @ 09:00) >    1. Severe left ventricular systolic dysfunction. EF 10 %  2. Dilated left ventricle, left atrium, right atrium.  3. Moderate mitral regurgitation.  4. Moderate tricuspid regurgitation.  5. Mild pulmonary hypertension.  6. No evidence of endocarditis on this transthoracic study.    < end of copied text >    r< from: CT Head No Cont (12.29.22 @ 13:20) >    IMPRESSION: No acute intracranial hemorrhage, mass effect, or shift of the midline structures.    Similar-appearing mild chronic white matter microvascular type changes.    < end of copied text >    < from: CT Head No Cont (12.23.22 @ 21:18) >    IMPRESSION:    No large territory acute infarct, intracranial hemorrhage, or mass effect.    Slight progression of chronic microangiopathic white matter changes as compared to prior study from 2016.    < end of copied text >    < from: CT Chest No Cont (12.23.22 @ 21:19) >    IMPRESSION:    *  Left chest tube with residual small left pneumothorax. No evidence of tension.  *  Right basilar rounded atelectasis again noted with chronic small loculated right pleural effusion.  *  Small pneumomediastinum. Left chest and abdominal wall emphysema. Moderate pneumoretroperitoneum and properitoneal air. No pneumoperitoneum. Findings are compatible with barotrauma.  *  Evidence of urinary bladder cystitis.    < end of copied text >

## 2023-01-10 NOTE — PROGRESS NOTE ADULT - SUBJECTIVE AND OBJECTIVE BOX
NEPHROLOGY PROGRESS NOTE    CHIEF COMPLAINT:  DERRICK/CKD    HPI:  Renal function stable,   mL.  Vented, on dobutamine, stable hemodynamics    EXAM:  T(F): 98.8 (01-10-23 @ 12:00)  HR: 90 (01-10-23 @ 13:19)  BP: 121/87 (01-10-23 @ 12:00)  RR: 19 (01-10-23 @ 12:00)  SpO2: 99% (01-10-23 @ 13:19)    Unresponsive  Normal respiratory effort, lungs clear bilaterally  Heart RRR with no murmur, no peripheral edema         LABS                             11.7   16.61 )-----------( 291      ( 09 Jan 2023 06:46 )             36.7          01-09    142  |  106  |  93<H>  ----------------------------<  202<H>  4.4   |  21<L>  |  3.46<H>    Ca    10.0      09 Jan 2023 06:46  Phos  7.0     01-09  Mg     2.6     01-09             Impression  1.  DERRICK on CKD-3 (Baseline creatinine 1.3-1.5): Ischemic ATN, slowly recovering   2.  Acute respiratory failure and PEA arrest  3.  S/p large left sided tension pneumothorax  4.  Chronic systolic heart failure    Recommend  Continue supportive care  No dialytic indications

## 2023-01-10 NOTE — PROGRESS NOTE ADULT - SUBJECTIVE AND OBJECTIVE BOX
Patient is a 62y old  Male who presents with a chief complaint of resp failure (10 David 2023 11:37)      Subjective:  INTERVAL HPI/OVERNIGHT EVENTS: Patient seen and examined at bedside.  Patient sedated   MEDICATIONS  (STANDING):  albuterol    90 MICROgram(s) HFA Inhaler 2 Puff(s) Inhalation every 6 hours  albuterol/ipratropium for Nebulization. 3 milliLiter(s) Nebulizer once  aMIOdarone    Tablet 200 milliGRAM(s) Oral daily  aspirin  chewable 81 milliGRAM(s) Oral daily  chlorhexidine 0.12% Liquid 15 milliLiter(s) Oral Mucosa every 12 hours  dextrose 5%. 1000 milliLiter(s) (100 mL/Hr) IV Continuous <Continuous>  dextrose 50% Injectable 25 Gram(s) IV Push once  dextrose 50% Injectable 12.5 Gram(s) IV Push once  dextrose 50% Injectable 25 Gram(s) IV Push once  DOBUTamine Infusion 5 MICROgram(s)/kG/Min (12.2 mL/Hr) IV Continuous <Continuous>  glucagon  Injectable 1 milliGRAM(s) IntraMuscular once  heparin   Injectable 5000 Unit(s) SubCutaneous every 8 hours  insulin lispro (ADMELOG) corrective regimen sliding scale   SubCutaneous every 6 hours  ipratropium 17 MICROgram(s) HFA Inhaler 1 Puff(s) Inhalation every 6 hours  meropenem  IVPB 500 milliGRAM(s) IV Intermittent every 12 hours  nystatin Powder 1 Application(s) Topical two times a day  pantoprazole  Injectable 40 milliGRAM(s) IV Push daily  propofol Infusion 10 MICROgram(s)/kG/Min (4.9 mL/Hr) IV Continuous <Continuous>    MEDICATIONS  (PRN):  dextrose Oral Gel 15 Gram(s) Oral once PRN Blood Glucose LESS THAN 70 milliGRAM(s)/deciliter  fentaNYL    Injectable 25 MICROGram(s) IV Push every 2 hours PRN pain or vent synchrony  midazolam Injectable 2 milliGRAM(s) IV Push every 2 hours PRN Agitation      Allergies    No Known Allergies    Intolerances    pork (Other)      REVIEW OF SYSTEMS:  not able to obtain due to sedation/alteredmentation       Objective:  Vital Signs Last 24 Hrs  T(C): 37.1 (10 David 2023 12:00), Max: 37.8 (09 Jan 2023 15:00)  T(F): 98.8 (10 David 2023 12:00), Max: 100.1 (09 Jan 2023 15:00)  HR: 92 (10 David 2023 12:00) (89 - 116)  BP: 121/87 (10 David 2023 12:00) (110/85 - 127/89)  BP(mean): 98 (10 David 2023 12:00) (88 - 100)  RR: 19 (10 David 2023 12:00) (17 - 28)  SpO2: 98% (10 David 2023 12:00) (94% - 100%)    Parameters below as of 10 David 2023 12:00  Patient On (Oxygen Delivery Method): ventilator    O2 Concentration (%): 50    GENERAL: NAD, lying in bed, intubated, sedated   HEAD:  Normocephalic  EYES:  conjunctiva and sclera clear  ENT: Moist mucous membranes  NECK: Supple  CHEST/LUNG: Clear to auscultation bilaterally, left chest tube in   HEART: Regular rate and rhythm; S1S2+  ABDOMEN: Bowel sounds present; Soft  EXTREMITIES:  + distal Peripheral Pulses;  NERVOUS SYSTEM: sedated   MSK: no limb movements noticed   SKIN: No rashes     LABS:      Ca    10.0       09 Jan 2023 06:46          CAPILLARY BLOOD GLUCOSE      POCT Blood Glucose.: 130 mg/dL (10 David 2023 11:58)  POCT Blood Glucose.: 119 mg/dL (10 David 2023 05:31)  POCT Blood Glucose.: 105 mg/dL (09 Jan 2023 23:58)  POCT Blood Glucose.: 115 mg/dL (09 Jan 2023 17:37)          RADIOLOGY & ADDITIONAL TESTS:  xray : 1/8 Catheters and tubes in place.  Mild decrease in right pleural effusion.  Personally reviewed.     Consultant(s) Notes Reviewed:  [x] YES  [ ] NO    Plan of care discussed with SPCU team

## 2023-01-10 NOTE — PROGRESS NOTE ADULT - ASSESSMENT
The patient is a 62 year old male with a history of CAD, chronic systolic heart failure s/p ICD, atrial flutter s/p DCCV, substance abuse, CKD who is admitted with respiratory failure in the setting of tension pneumothorax complicated by cardiac arrest.    Plan:  - ECG with sinus tachycardia and known LBBB  - Echo 2/22 with severely reduced LV (EF 10%) and RV function, mod MR, mod TR, mild pulm HTN  - Not on beta blocker as the patient is on dobutamine. Not a candidate for ACEI/ARB/ARNI at the current time due to renal function.  - Poor renal function - hold apixaban  - Hold bumetanide and spironolactone due to DERRICK  - Continue amiodarone 200 mg daily  - Continue aspirin 81 mg daily  - Renal function improving now  - Increase dobutamine back to 5 mcg/kg/min - will continue at this dose until plans for trach, etc. and attempt to wean after  - Dobutamine dose should not be lowered for NSVT  - Chest tubes in place  - Mechanical ventilation  - ICU care  - Overall poor prognosis. Comfort care would be most appropriate. However, if plan is for continued aggressive care, would evaluate for trach.

## 2023-01-10 NOTE — PROGRESS NOTE ADULT - SUBJECTIVE AND OBJECTIVE BOX
INTERVAL HPI/OVERNIGHT EVENTS:  No new overnight event.  No N/V/D.    MEDICATIONS  (STANDING):  albuterol    90 MICROgram(s) HFA Inhaler 2 Puff(s) Inhalation every 6 hours  albuterol/ipratropium for Nebulization. 3 milliLiter(s) Nebulizer once  aMIOdarone    Tablet 200 milliGRAM(s) Oral daily  aspirin  chewable 81 milliGRAM(s) Oral daily  chlorhexidine 0.12% Liquid 15 milliLiter(s) Oral Mucosa every 12 hours  dextrose 5%. 1000 milliLiter(s) (100 mL/Hr) IV Continuous <Continuous>  dextrose 50% Injectable 25 Gram(s) IV Push once  dextrose 50% Injectable 12.5 Gram(s) IV Push once  dextrose 50% Injectable 25 Gram(s) IV Push once  DOBUTamine Infusion 5 MICROgram(s)/kG/Min (12.2 mL/Hr) IV Continuous <Continuous>  glucagon  Injectable 1 milliGRAM(s) IntraMuscular once  heparin   Injectable 5000 Unit(s) SubCutaneous every 8 hours  insulin lispro (ADMELOG) corrective regimen sliding scale   SubCutaneous every 6 hours  ipratropium 17 MICROgram(s) HFA Inhaler 1 Puff(s) Inhalation every 6 hours  meropenem  IVPB 500 milliGRAM(s) IV Intermittent every 12 hours  nystatin Powder 1 Application(s) Topical two times a day  pantoprazole  Injectable 40 milliGRAM(s) IV Push daily  propofol Infusion 10 MICROgram(s)/kG/Min (4.9 mL/Hr) IV Continuous <Continuous>    MEDICATIONS  (PRN):  dextrose Oral Gel 15 Gram(s) Oral once PRN Blood Glucose LESS THAN 70 milliGRAM(s)/deciliter  fentaNYL    Injectable 25 MICROGram(s) IV Push every 2 hours PRN pain or vent synchrony  midazolam Injectable 2 milliGRAM(s) IV Push every 2 hours PRN Agitation      Allergies    No Known Allergies    Intolerances    pork (Other)      Review of Systems:    General:  No wt loss, fevers, chills, night sweats,fatigue,   Eyes:  Good vision, no reported pain  ENT:  No sore throat, pain, runny nose, dysphagia  CV:  No pain, palpitatioins, hypo/hypertension  Resp:  No dyspnea, cough, tachypnea, wheezing  GI:  No pain, No nausea, No vomiting, No diarrhea, No constipatiion, No weight loss, No fever, No pruritis, No rectal bleeding, No tarry stools, No dysphagia,  :  No pain, bleeding, incontinence, nocturia  Muscle:  No pain, weakness  Neuro:  No weakness, tingling, memory problems  Psych:  No fatigue, insomnia, mood problems, depression  Endocrine:  No polyuria, polydypsia, cold/heat intolerance  Heme:  No petechiae, ecchymosis, easy bruisability  Skin:  No rash, tattoos, scars, edema      Vital Signs Last 24 Hrs  T(C): 36.9 (10 David 2023 08:00), Max: 37.8 (09 Jan 2023 15:00)  T(F): 98.5 (10 David 2023 08:00), Max: 100.1 (09 Jan 2023 15:00)  HR: 91 (10 David 2023 09:00) (89 - 116)  BP: 116/80 (10 David 2023 09:00) (110/85 - 123/89)  BP(mean): 90 (10 David 2023 09:00) (88 - 101)  RR: 26 (10 David 2023 09:00) (17 - 28)  SpO2: 99% (10 David 2023 09:00) (94% - 100%)    Parameters below as of 10 David 2023 08:00  Patient On (Oxygen Delivery Method): ventilator    O2 Concentration (%): 35    PHYSICAL EXAM:    Constitutional: NAD, well-developed  HEENT: EOMI, throat clear  Neck: No LAD, supple  Respiratory: CTA and P  Cardiovascular: S1 and S2, RRR, no M  Gastrointestinal: BS+, soft, NT/ND, neg HSM,  Extremities: No peripheral edema, neg clubing, cyanosis  Vascular: 2+ peripheral pulses  Neurological: A/O x 3, no focal deficits  Psychiatric: Normal mood, normal affect  Skin: No rashes      LABS:                        11.7   16.61 )-----------( 291      ( 09 Jan 2023 06:46 )             36.7     01-09    142  |  106  |  93<H>  ----------------------------<  202<H>  4.4   |  21<L>  |  3.46<H>    Ca    10.0      09 Jan 2023 06:46  Phos  7.0     01-09  Mg     2.6     01-09            RADIOLOGY & ADDITIONAL TESTS:

## 2023-01-10 NOTE — PROGRESS NOTE ADULT - ASSESSMENT
62M with HTN, dilated CM s/p AICD, CKD, opioid / substance abuse, AFib/Flutter admitted with acute hypoxic respiratory failure requiring intubation. Course complicated by L PTX s/p intubation and PEA arrest with chest tube emergently placed by ED and subsequent ROSC. Admitted to SPCU with course further complicated with Aspiration PNA, shock, and DERRICK on CKD.    Acute Hypoxic Respiratory Failure s/t L tension PTX with subsequent cardiac arrest  Aspiration PNA  Acute Systolic CHF / Dilated Cardiomyopathy  DERRICK on CKD    Plan:  NEURO: Sedated with propofol and PRN fentanyl  CARDIAC: Remains in shock on dobutamine. Dobutamine increased to 5 mcg/kg/min.   RESPIRATORY: Remains on Full vent support. Titrating settings to maintain SpO2 >92%  GI: NPO with tube feeds  : DERRICK likely attributable to ongoing shock state. Improved UOP on dobutamine, continue to trend Cr and monitor I&Os. Goal UOP >0.5 cc/kg/hr  ENDO: ISS, q6h FS while on TFs  ID: Afebrile, WBC uptrended to 16k. S/p full course of zosyn and meropenem.   HEME: [facility] for DVT ppx    DISPO: SPCU   62M with HTN, dilated CM s/p AICD, CKD, opioid / substance abuse, AFib/Flutter admitted with acute hypoxic respiratory failure requiring intubation. Course complicated by L PTX s/p intubation and PEA arrest with chest tube emergently placed by ED and subsequent ROSC. Admitted to SPCU with course further complicated with Aspiration PNA, shock, and DERRICK on CKD.    Acute Hypoxic Respiratory Failure s/t L tension PTX with subsequent cardiac arrest  Aspiration PNA  Acute Systolic CHF / Dilated Cardiomyopathy  DERRICK on CKD    Plan:  NEURO: Sedated with propofol and PRN fentanyl  CARDIAC: Remains in shock on dobutamine. Dobutamine increased to 5 mcg/kg/min. Continue Amiodarone 200mg daily  RESPIRATORY: Remains on Full vent support. Titrating settings to maintain SpO2 >92%. Chest tube remains to suction  GI: NPO with tube feeds  : DERRICK likely attributable to ongoing shock state. Improved UOP on dobutamine, continue to trend Cr and monitor I&Os. Goal UOP >0.5 cc/kg/hr  ENDO: ISS, q6h FS while on TFs  ID: Afebrile, WBC uptrended to 16k. S/p full course of zosyn and meropenem.   HEME: [facility] for DVT ppx    DISPO: SPCU

## 2023-01-10 NOTE — PROGRESS NOTE ADULT - SUBJECTIVE AND OBJECTIVE BOX
Date/Time Patient Seen:  		  Referring MD:   Data Reviewed	       Patient is a 62y old  Male who presents with a chief complaint of resp failure (09 Jan 2023 21:26)      Subjective/HPI     PAST MEDICAL & SURGICAL HISTORY:  Heart failure, systolic    CAD (coronary artery disease)    Hypertension    Nonischemic cardiomyopathy    COPD, moderate    2019 novel coronavirus disease (COVID-19)    Substance abuse    HLD (hyperlipidemia)    Cardiac LV ejection fraction 10-20%    No significant past surgical history    AICD (automatic cardioverter/defibrillator) present    Cardiac LV ejection fraction 10-20%          Medication list         MEDICATIONS  (STANDING):  albuterol    90 MICROgram(s) HFA Inhaler 2 Puff(s) Inhalation every 6 hours  albuterol/ipratropium for Nebulization. 3 milliLiter(s) Nebulizer once  aMIOdarone    Tablet 200 milliGRAM(s) Oral daily  aspirin  chewable 81 milliGRAM(s) Oral daily  chlorhexidine 0.12% Liquid 15 milliLiter(s) Oral Mucosa every 12 hours  dextrose 5%. 1000 milliLiter(s) (100 mL/Hr) IV Continuous <Continuous>  dextrose 50% Injectable 25 Gram(s) IV Push once  dextrose 50% Injectable 12.5 Gram(s) IV Push once  dextrose 50% Injectable 25 Gram(s) IV Push once  DOBUTamine Infusion 5 MICROgram(s)/kG/Min (12.2 mL/Hr) IV Continuous <Continuous>  glucagon  Injectable 1 milliGRAM(s) IntraMuscular once  heparin   Injectable 5000 Unit(s) SubCutaneous every 8 hours  insulin lispro (ADMELOG) corrective regimen sliding scale   SubCutaneous every 6 hours  ipratropium 17 MICROgram(s) HFA Inhaler 1 Puff(s) Inhalation every 6 hours  meropenem  IVPB 500 milliGRAM(s) IV Intermittent every 12 hours  nystatin Powder 1 Application(s) Topical two times a day  pantoprazole  Injectable 40 milliGRAM(s) IV Push daily  propofol Infusion 10 MICROgram(s)/kG/Min (4.9 mL/Hr) IV Continuous <Continuous>    MEDICATIONS  (PRN):  dextrose Oral Gel 15 Gram(s) Oral once PRN Blood Glucose LESS THAN 70 milliGRAM(s)/deciliter  fentaNYL    Injectable 25 MICROGram(s) IV Push every 2 hours PRN pain or vent synchrony  midazolam Injectable 2 milliGRAM(s) IV Push every 2 hours PRN Agitation         Vitals log        ICU Vital Signs Last 24 Hrs  T(C): 36.7 (10 David 2023 04:23), Max: 37.8 (09 Jan 2023 15:00)  T(F): 98.1 (10 David 2023 04:23), Max: 100.1 (09 Jan 2023 15:00)  HR: 92 (10 David 2023 05:45) (89 - 116)  BP: 122/82 (10 David 2023 05:00) (110/85 - 129/94)  BP(mean): 93 (10 David 2023 05:00) (88 - 105)  ABP: --  ABP(mean): --  RR: 24 (10 David 2023 05:00) (15 - 28)  SpO2: 99% (10 David 2023 05:45) (92% - 100%)    O2 Parameters below as of 10 David 2023 05:00  Patient On (Oxygen Delivery Method): ventilator    O2 Concentration (%): 35         Mode: AC/ CMV (Assist Control/ Continuous Mandatory Ventilation)  RR (machine): 20  TV (machine): 400  FiO2: 35  PEEP: 5  ITime: 1  MAP: 13  PIP: 28      Input and Output:  I&O's Detail    08 Jan 2023 07:01  -  09 Jan 2023 07:00  --------------------------------------------------------  IN:    DOBUTamine: 140.3 mL    Enteral Tube Flush: 400 mL    IV PiggyBack: 50 mL    Nepro with Carb Steady: 920 mL    Propofol: 173 mL  Total IN: 1683.3 mL    OUT:    Chest Tube (mL): 20 mL    Chest Tube (mL): 0 mL    Indwelling Catheter - Urethral (mL): 1050 mL  Total OUT: 1070 mL    Total NET: 613.3 mL      09 Jan 2023 07:01  -  10 David 2023 06:36  --------------------------------------------------------  IN:    DOBUTamine: 147.6 mL    Enteral Tube Flush: 250 mL    Nepro with Carb Steady: 440 mL    Propofol: 188.4 mL  Total IN: 1026 mL    OUT:    Chest Tube (mL): 0 mL    Chest Tube (mL): 0 mL    Indwelling Catheter - Urethral (mL): 600 mL  Total OUT: 600 mL    Total NET: 426 mL          Lab Data                        11.7   16.61 )-----------( 291      ( 09 Jan 2023 06:46 )             36.7     01-09    142  |  106  |  93<H>  ----------------------------<  202<H>  4.4   |  21<L>  |  3.46<H>    Ca    10.0      09 Jan 2023 06:46  Phos  7.0     01-09  Mg     2.6     01-09              Review of Systems	      Objective     Physical Examination    heart s1s2  lung dc BS  head nc      Pertinent Lab findings & Imaging      Leo:  NO   Adequate UO     I&O's Detail    08 Jan 2023 07:01  -  09 Jan 2023 07:00  --------------------------------------------------------  IN:    DOBUTamine: 140.3 mL    Enteral Tube Flush: 400 mL    IV PiggyBack: 50 mL    Nepro with Carb Steady: 920 mL    Propofol: 173 mL  Total IN: 1683.3 mL    OUT:    Chest Tube (mL): 20 mL    Chest Tube (mL): 0 mL    Indwelling Catheter - Urethral (mL): 1050 mL  Total OUT: 1070 mL    Total NET: 613.3 mL      09 Jan 2023 07:01  -  10 David 2023 06:36  --------------------------------------------------------  IN:    DOBUTamine: 147.6 mL    Enteral Tube Flush: 250 mL    Nepro with Carb Steady: 440 mL    Propofol: 188.4 mL  Total IN: 1026 mL    OUT:    Chest Tube (mL): 0 mL    Chest Tube (mL): 0 mL    Indwelling Catheter - Urethral (mL): 600 mL  Total OUT: 600 mL    Total NET: 426 mL               Discussed with:     Cultures:	        Radiology

## 2023-01-11 NOTE — PROGRESS NOTE ADULT - SUBJECTIVE AND OBJECTIVE BOX
CC:  Patient is a 62y old  Male who presents with a chief complaint of resp failure (11 Jan 2023 15:20)      HPI/BRIEF HOSPITAL COURSE:   62M with HTN, dilated CM s/p AICD, CKD, opioid / substance abuse, AFib/Flutter admitted with acute hypoxic respiratory failure requiring intubation. Course complicated by L PTX s/p intubation and PEA arrest with chest tube emergently placed by ED and subsequent ROSC. Admitted to [facility] with course further complicated with Aspiration PNA, shock, and DERRICK on CKD 2/2 ATN. Course further complicated by tension pneumo and 2nd CT placement.       Events last 24 hours:   Remains on 5 mcg/kg/min dobutamine. Scr continues to rise.     PAST MEDICAL & SURGICAL HISTORY    Heart failure, systolic      CAD (coronary artery disease)      Hypertension      Nonischemic cardiomyopathy      COPD, moderate      2019 novel coronavirus disease (COVID-19)      Substance abuse      HLD (hyperlipidemia)      Cardiac LV ejection fraction 10-20%      AICD (automatic cardioverter/defibrillator) present      Cardiac LV ejection fraction 10-20%        Allergies    No Known Allergies    Intolerances    pork (Other)    FAMILY HISTORY:  FH: lung cancer        Review of Systems:  Negative 2/2 intubated/sedated      Medications:    aMIOdarone    Tablet 200 milliGRAM(s) Oral daily  DOBUTamine Infusion 5 MICROgram(s)/kG/Min IV Continuous <Continuous>    albuterol    90 MICROgram(s) HFA Inhaler 2 Puff(s) Inhalation every 6 hours  albuterol/ipratropium for Nebulization. 3 milliLiter(s) Nebulizer once  ipratropium 17 MICROgram(s) HFA Inhaler 1 Puff(s) Inhalation every 6 hours    propofol Infusion 10 MICROgram(s)/kG/Min IV Continuous <Continuous>      aspirin  chewable 81 milliGRAM(s) Oral daily  heparin   Injectable 5000 Unit(s) SubCutaneous every 8 hours    pantoprazole  Injectable 40 milliGRAM(s) IV Push daily      dextrose 50% Injectable 25 Gram(s) IV Push once  dextrose 50% Injectable 12.5 Gram(s) IV Push once  dextrose 50% Injectable 25 Gram(s) IV Push once  dextrose Oral Gel 15 Gram(s) Oral once PRN  glucagon  Injectable 1 milliGRAM(s) IntraMuscular once  insulin lispro (ADMELOG) corrective regimen sliding scale   SubCutaneous every 6 hours    dextrose 5%. 1000 milliLiter(s) IV Continuous <Continuous>      chlorhexidine 0.12% Liquid 15 milliLiter(s) Oral Mucosa every 12 hours  nystatin Powder 1 Application(s) Topical two times a day        Mode: AC/ CMV (Assist Control/ Continuous Mandatory Ventilation)  RR (machine): 20  TV (machine): 400  FiO2: 35  PEEP: 5  ITime: 1  MAP: 14  PIP: 33      ICU Vital Signs Last 24 Hrs  T(C): 36.3 (11 Jan 2023 15:30), Max: 37 (10 David 2023 21:14)  T(F): 97.4 (11 Jan 2023 15:30), Max: 98.6 (10 David 2023 21:14)  HR: 89 (11 Jan 2023 18:00) (86 - 109)  BP: 115/79 (11 Jan 2023 18:00) (111/77 - 135/100)  BP(mean): 90 (11 Jan 2023 18:00) (87 - 112)  ABP: --  ABP(mean): --  RR: 25 (11 Jan 2023 18:00) (16 - 25)  SpO2: 94% (11 Jan 2023 18:00) (94% - 100%)    O2 Parameters below as of 11 Jan 2023 16:00  Patient On (Oxygen Delivery Method): nasal cannula  O2 Flow (L/min): 4        Vital Signs Last 24 Hrs  T(C): 36.3 (11 Jan 2023 15:30), Max: 37 (10 David 2023 21:14)  T(F): 97.4 (11 Jan 2023 15:30), Max: 98.6 (10 David 2023 21:14)  HR: 89 (11 Jan 2023 18:00) (86 - 109)  BP: 115/79 (11 Jan 2023 18:00) (111/77 - 135/100)  BP(mean): 90 (11 Jan 2023 18:00) (87 - 112)  RR: 25 (11 Jan 2023 18:00) (16 - 25)  SpO2: 94% (11 Jan 2023 18:00) (94% - 100%)    Parameters below as of 11 Jan 2023 16:00  Patient On (Oxygen Delivery Method): nasal cannula  O2 Flow (L/min): 4          I&O's Detail    10 David 2023 07:01  -  11 Jan 2023 07:00  --------------------------------------------------------  IN:    DOBUTamine: 280.6 mL    Enteral Tube Flush: 1000 mL    Nepro with Carb Steady: 920 mL    Propofol: 112.7 mL  Total IN: 2313.3 mL    OUT:    Chest Tube (mL): 0 mL    Chest Tube (mL): 0 mL    Indwelling Catheter - Urethral (mL): 700 mL    Rectal Tube (mL): 75 mL  Total OUT: 775 mL    Total NET: 1538.3 mL      11 Jan 2023 07:01  -  11 Jan 2023 19:11  --------------------------------------------------------  IN:    DOBUTamine: 146.4 mL    Enteral Tube Flush: 250 mL    Nepro with Carb Steady: 440 mL    Propofol: 58.8 mL  Total IN: 895.2 mL    OUT:    Chest Tube (mL): 0 mL    Chest Tube (mL): 0 mL    Indwelling Catheter - Urethral (mL): 250 mL    Rectal Tube (mL): 75 mL  Total OUT: 325 mL    Total NET: 570.2 mL            LABS:                        11.4   11.92 )-----------( 252      ( 11 Jan 2023 06:21 )             36.1     01-11    142  |  105  |  113<H>  ----------------------------<  174<H>  4.4   |  23  |  3.85<H>    Ca    9.7      11 Jan 2023 06:21  Phos  7.1     01-11  Mg     2.8     01-11    TPro  7.5  /  Alb  2.3<L>  /  TBili  0.8  /  DBili  x   /  AST  62<H>  /  ALT  67<H>  /  AlkPhos  374<H>  01-11          CAPILLARY BLOOD GLUCOSE      POCT Blood Glucose.: 117 mg/dL (11 Jan 2023 17:17)        CULTURES:        Physical Examination:  General: Int/Sedated  NEURO: Int/sedated. CN2-12 intact.   HEENT: Pupils equal, reactive to light.  Symmetric.  PULM: CTA BL, no significant sputum production, no wheezes, rales, rhonchi  CVS: Regular rate and rhythm, no murmurs, rubs, or gallops  ABD: Soft, nondistended, nontender, normoactive bowel sounds, no masses  EXT: No edema, nontender  SKIN: Warm and well perfused, no rashes noted

## 2023-01-11 NOTE — PROGRESS NOTE ADULT - ASSESSMENT
Assessment/Plan  62M with HTN, dilated CM s/p AICD, CKD, opioid / substance abuse, AFib/Flutter remains in [facility]PCU for active treatment of   1. S/p Cardiac arrest  2. Cardiomyopathy   3. Acute hypoxic RF  4. ATN     Neuro: Sedated with propofol.   Cardio: Continue dobutamine at 5 mcg/kg/min for CMO with EF < 10 % Will not d/c even in setting of NSVT as per cardio. Will avoid negative inotropes.  Continue PO amio  Pulm: Increasing oxygen requirements, activley titrating Fio2 to maintain Spo2 > 90 %. Lung protective TV 6-8 cc/kg/IBW. Keep Plateau pressure < 30. Vent bundle in place. 2 CT remain in place to suction. Continue nebs.   GI: Tube feeds. IV PPI prophylaxis  Renal: Rising Scr now to 3.85, likely ATN from ischemia. COntinue to trend Scr and lytes and replete as needed. Remains oligouric, I/O's. No indication for urgent HD at this time, nephrology following, continue supportive care.   ID: Finished course of meropenam 2 days ago for aspiration PNA. Downtrending WBC. Will monitor off abx at this time.   Heme: Hep Sub Q  Endo: ISS. Goal -180  Dispo: Poor prognosis, remains in SPCU.

## 2023-01-11 NOTE — PROGRESS NOTE ADULT - SUBJECTIVE AND OBJECTIVE BOX
Patient is a 62y old  Male who presents with a chief complaint of resp failure (11 Jan 2023 10:43)      Subjective:  INTERVAL HPI/OVERNIGHT EVENTS: Patient seen and examined at bedside.  Patient is sedated   MEDICATIONS  (STANDING):  albuterol    90 MICROgram(s) HFA Inhaler 2 Puff(s) Inhalation every 6 hours  albuterol/ipratropium for Nebulization. 3 milliLiter(s) Nebulizer once  aMIOdarone    Tablet 200 milliGRAM(s) Oral daily  aspirin  chewable 81 milliGRAM(s) Oral daily  chlorhexidine 0.12% Liquid 15 milliLiter(s) Oral Mucosa every 12 hours  dextrose 5%. 1000 milliLiter(s) (100 mL/Hr) IV Continuous <Continuous>  dextrose 50% Injectable 25 Gram(s) IV Push once  dextrose 50% Injectable 12.5 Gram(s) IV Push once  dextrose 50% Injectable 25 Gram(s) IV Push once  DOBUTamine Infusion 5 MICROgram(s)/kG/Min (12.2 mL/Hr) IV Continuous <Continuous>  glucagon  Injectable 1 milliGRAM(s) IntraMuscular once  heparin   Injectable 5000 Unit(s) SubCutaneous every 8 hours  insulin lispro (ADMELOG) corrective regimen sliding scale   SubCutaneous every 6 hours  ipratropium 17 MICROgram(s) HFA Inhaler 1 Puff(s) Inhalation every 6 hours  nystatin Powder 1 Application(s) Topical two times a day  pantoprazole  Injectable 40 milliGRAM(s) IV Push daily  propofol Infusion 10 MICROgram(s)/kG/Min (4.9 mL/Hr) IV Continuous <Continuous>    MEDICATIONS  (PRN):  dextrose Oral Gel 15 Gram(s) Oral once PRN Blood Glucose LESS THAN 70 milliGRAM(s)/deciliter      Allergies    No Known Allergies    Intolerances    pork (Other)      REVIEW OF SYSTEMS:  not able to obtain due to altered mental status       Objective:  Vital Signs Last 24 Hrs  T(C): 36.6 (11 Jan 2023 12:24), Max: 37 (10 David 2023 21:14)  T(F): 97.9 (11 Jan 2023 12:24), Max: 98.6 (10 David 2023 21:14)  HR: 91 (11 Jan 2023 12:18) (88 - 109)  BP: 113/79 (11 Jan 2023 12:00) (113/79 - 135/100)  BP(mean): 89 (11 Jan 2023 12:00) (89 - 112)  RR: 16 (11 Jan 2023 12:00) (16 - 30)  SpO2: 99% (11 Jan 2023 12:18) (91% - 100%)    Parameters below as of 11 Jan 2023 12:00  Patient On (Oxygen Delivery Method): ventilator    O2 Concentration (%): 35    GENERAL: NAD, lying in bed, intubated, sedated   HEAD:  Normocephalic  EYES:  conjunctiva and sclera clear  ENT: Moist mucous membranes  NECK: Supple  CHEST/LUNG: Clear to auscultation bilaterally, left chest tube in   HEART: Regular rate and rhythm; S1S2+  ABDOMEN: Bowel sounds present; Soft  EXTREMITIES:  + distal Peripheral Pulses;  NERVOUS SYSTEM: sedated   MSK: no limb movements noticed   SKIN: No rashes     LABS:                        11.4   11.92 )-----------( 252      ( 11 Jan 2023 06:21 )             36.1     11 Jan 2023 06:21    142    |  105    |  113    ----------------------------<  174    4.4     |  23     |  3.85     Ca    9.7        11 Jan 2023 06:21  Phos  7.1       11 Jan 2023 06:21  Mg     2.8       11 Jan 2023 06:21    TPro  7.5    /  Alb  2.3    /  TBili  0.8    /  DBili  x      /  AST  62     /  ALT  67     /  AlkPhos  374    11 Jan 2023 06:21        CAPILLARY BLOOD GLUCOSE      POCT Blood Glucose.: 103 mg/dL (11 Jan 2023 11:31)  POCT Blood Glucose.: 181 mg/dL (11 Jan 2023 06:30)  POCT Blood Glucose.: 134 mg/dL (10 David 2023 23:42)  POCT Blood Glucose.: 123 mg/dL (10 David 2023 18:03)        Consultant(s) Notes Reviewed:  [x] YES  [ ] NO

## 2023-01-11 NOTE — PROGRESS NOTE ADULT - SUBJECTIVE AND OBJECTIVE BOX
Date/Time Patient Seen:  		  Referring MD:   Data Reviewed	       Patient is a 62y old  Male who presents with a chief complaint of resp failure (10 David 2023 22:09)      Subjective/HPI     PAST MEDICAL & SURGICAL HISTORY:  Heart failure, systolic    CAD (coronary artery disease)    Hypertension    Nonischemic cardiomyopathy    COPD, moderate    2019 novel coronavirus disease (COVID-19)    Substance abuse    HLD (hyperlipidemia)    Cardiac LV ejection fraction 10-20%    No significant past surgical history    AICD (automatic cardioverter/defibrillator) present    Cardiac LV ejection fraction 10-20%          Medication list         MEDICATIONS  (STANDING):  albuterol    90 MICROgram(s) HFA Inhaler 2 Puff(s) Inhalation every 6 hours  albuterol/ipratropium for Nebulization. 3 milliLiter(s) Nebulizer once  aMIOdarone    Tablet 200 milliGRAM(s) Oral daily  aspirin  chewable 81 milliGRAM(s) Oral daily  chlorhexidine 0.12% Liquid 15 milliLiter(s) Oral Mucosa every 12 hours  dextrose 5%. 1000 milliLiter(s) (100 mL/Hr) IV Continuous <Continuous>  dextrose 50% Injectable 25 Gram(s) IV Push once  dextrose 50% Injectable 12.5 Gram(s) IV Push once  dextrose 50% Injectable 25 Gram(s) IV Push once  DOBUTamine Infusion 5 MICROgram(s)/kG/Min (12.2 mL/Hr) IV Continuous <Continuous>  glucagon  Injectable 1 milliGRAM(s) IntraMuscular once  heparin   Injectable 5000 Unit(s) SubCutaneous every 8 hours  insulin lispro (ADMELOG) corrective regimen sliding scale   SubCutaneous every 6 hours  ipratropium 17 MICROgram(s) HFA Inhaler 1 Puff(s) Inhalation every 6 hours  nystatin Powder 1 Application(s) Topical two times a day  pantoprazole  Injectable 40 milliGRAM(s) IV Push daily  propofol Infusion 10 MICROgram(s)/kG/Min (4.9 mL/Hr) IV Continuous <Continuous>    MEDICATIONS  (PRN):  dextrose Oral Gel 15 Gram(s) Oral once PRN Blood Glucose LESS THAN 70 milliGRAM(s)/deciliter         Vitals log        ICU Vital Signs Last 24 Hrs  T(C): 36.7 (11 Jan 2023 03:52), Max: 37.1 (10 David 2023 12:00)  T(F): 98.1 (11 Jan 2023 03:52), Max: 98.8 (10 David 2023 12:00)  HR: 95 (11 Jan 2023 04:40) (90 - 109)  BP: 117/84 (11 Jan 2023 04:00) (113/84 - 135/100)  BP(mean): 94 (11 Jan 2023 04:00) (90 - 112)  ABP: --  ABP(mean): --  RR: 20 (11 Jan 2023 04:00) (16 - 30)  SpO2: 99% (11 Jan 2023 04:40) (91% - 100%)    O2 Parameters below as of 11 Jan 2023 04:00  Patient On (Oxygen Delivery Method): ventilator    O2 Concentration (%): 35         Mode: AC/ CMV (Assist Control/ Continuous Mandatory Ventilation)  RR (machine): 20  TV (machine): 400  FiO2: 35  PEEP: 5  ITime: 1  MAP: 16  PIP: 46      Input and Output:  I&O's Detail    09 Jan 2023 07:01  -  10 David 2023 07:00  --------------------------------------------------------  IN:    DOBUTamine: 153.7 mL    Enteral Tube Flush: 250 mL    Nepro with Carb Steady: 480 mL    Propofol: 193.3 mL  Total IN: 1077 mL    OUT:    Chest Tube (mL): 0 mL    Chest Tube (mL): 0 mL    Indwelling Catheter - Urethral (mL): 600 mL  Total OUT: 600 mL    Total NET: 477 mL      10 David 2023 07:01  -  11 Jan 2023 06:32  --------------------------------------------------------  IN:    DOBUTamine: 256.2 mL    Enteral Tube Flush: 750 mL    Nepro with Carb Steady: 840 mL    Propofol: 102.9 mL  Total IN: 1949.1 mL    OUT:    Chest Tube (mL): 0 mL    Chest Tube (mL): 0 mL    Indwelling Catheter - Urethral (mL): 700 mL    Rectal Tube (mL): 75 mL  Total OUT: 775 mL    Total NET: 1174.1 mL          Lab Data                        11.4   11.92 )-----------( 252      ( 11 Jan 2023 06:21 )             36.1     01-09    142  |  106  |  93<H>  ----------------------------<  202<H>  4.4   |  21<L>  |  3.46<H>    Ca    10.0      09 Jan 2023 06:46  Phos  7.0     01-09  Mg     2.6     01-09              Review of Systems	      Objective     Physical Examination    heart s1s2  lung dec BS      Pertinent Lab findings & Imaging      Leo:  NO   Adequate UO     I&O's Detail    09 Jan 2023 07:01  -  10 David 2023 07:00  --------------------------------------------------------  IN:    DOBUTamine: 153.7 mL    Enteral Tube Flush: 250 mL    Nepro with Carb Steady: 480 mL    Propofol: 193.3 mL  Total IN: 1077 mL    OUT:    Chest Tube (mL): 0 mL    Chest Tube (mL): 0 mL    Indwelling Catheter - Urethral (mL): 600 mL  Total OUT: 600 mL    Total NET: 477 mL      10 David 2023 07:01  -  11 Jan 2023 06:32  --------------------------------------------------------  IN:    DOBUTamine: 256.2 mL    Enteral Tube Flush: 750 mL    Nepro with Carb Steady: 840 mL    Propofol: 102.9 mL  Total IN: 1949.1 mL    OUT:    Chest Tube (mL): 0 mL    Chest Tube (mL): 0 mL    Indwelling Catheter - Urethral (mL): 700 mL    Rectal Tube (mL): 75 mL  Total OUT: 775 mL    Total NET: 1174.1 mL               Discussed with:     Cultures:	        Radiology

## 2023-01-11 NOTE — PROGRESS NOTE ADULT - ASSESSMENT
63 y/o M CAD, Dilated cardiomyopathy, S/p AICD, Afib/flutter, h/o substance abuse,  presented with acute hypoxic and hypercarbic respiratory failure associated with pneumothorax which led to cardiac arrest       Acute respiratory failure secondary to tension pneumothorax with leading to cardiac arrest   - continue vent management;    - continue chest tubes to suction for now.   -duoneb inhalation     Aspiration pneumonia /Sputum Cx psudomonous    completed zosyn x10 days , on meropenem now     hypernatremia  - resolved    Cardiomyopathy, Acute systolic CHF   - hold eliquis suggested   - on dobutamine drip per cardiolgy  - amiodarone    DM2  - FS monitoring with lispro coverage scale while critically ill    DERRICK on CKD  - Likely ATN/ischemic   - monitor creatinine: slight improved    - avoid nephrotoxic agents  - per renal : no need for dialysis a this point ,    Prophylactic measure  - DVT proph: heparin SQ has been on hold due to bleeding from ET tube.    - GI proph: protonix      Ethics consult appreciated.  SW aware to evaluate for next steps in either trach/peg/possible HD if renal function worsen  vs   comfort care.

## 2023-01-11 NOTE — PROGRESS NOTE ADULT - ASSESSMENT
REVIEW OF SYMPTOMS      Able to give (reliable) ROS  NO     PHYSICAL EXAM    HEENT Unremarkable  atraumatic   RESP Fair air entry EXP prolonged    Harsh breath sound Resp distres mild   CARDIAC S1 S2 No S3     NO JVD    ABDOMEN SOFT BS PRESENT NOT DISTENDED No hepatosplenomegaly   PEDAL EDEMA present No calf tenderness  NO rash       GENERAL DATA .   GOC.   12/23/2022 full code  ALLGY.     nka                  WT.     12/24/2022 81           BMI.       12/24/2022 26          ICU STAY. .. 12/23/2022  COVID. ..  12/23/2022 scv2 (-)   BEST PRACTICE ISSUES.    HOB ELEVATN. Yes  DVT PPLX. ..    12/23/2022 hpsc   WHITMORE PPLX. ..    12/23/2022 protonix 40   INFN PPLX. ..  12/23/2022 chlorhexidine .12%   SP SW VIVIAN.         DIET.  .. 12/26 nepro 960 ng    IV fl...  FREE WATER 1/9/2023 free water 250.3   PROCEDURES...   12/23/2022  l chest tube   12/23/2022 intubated   12/23/2022 reed       ABGS.  1/6/2023 vent 30% 739/28/65     VS/ PO/IO/ VENT/ DRIPS.  1/11/2023 afeb 90 110/70   1/11/2023 8a dobu 5 m/k/m   1/11/2023 8a prop 10 m/k/m  1/11/2023 ac 20/400/5/.35     AGE doa cc.  62 m doa 12/23/2022 resp distress    PREV ADMISSION 2/11-2/25/2022 [facility] P    PMH  PMH COPD  PMH COVID (+) 2/11/2022   PMH S aureus bacteremia mssa 2/11   .. Ancef 2/12/2022 Dr Phan  PM AICD  PMH HFREF  .. ECHO 11/20/2021 ef 20%  PMH A fib (on eliquis)     PROBLEMS ASSESSMENT RECOMMENDATIONS.  Intubation 12/23/2022  VENT   .. bundle dsv dsbt ltvv pplat 30 (-) PO2 60 (+) ph 7.3 (+)  .. lung protective ventilatn   .. wean as told if fails then trach  .. failing cpap so far  PROLONGED INTUBATN.  .. Plan trach vs GOC determination   WEANING.  .. 1/8/2023 dw resp pt has lot of secretions   SEDATION.  .. dexm 12/30-1/6  .. prop 1/6 -->   .. target rass 0 to (-) 1   HEMOPTYSIS   .. 1/3 given ddavp 25  .. 5 d meropenem started 1/3   .. 1/5/2023 no further hemoptysis   INFECTION.  .. w 1/6-1/8-1/9-1/11/2023 w 19  - 14- 16- 11.9  .. 1/6/2023 Has leukocytosis   .. 1/3 meroipenem restarted as hemoptysis leukocytosis   Anemia.  .. Hb 1/8/2023 Hb 10   .. target hb 7 (+)   DERRICK   .. Cr 1/2-1/8-1/9-1/11/2023 Cr 5.1 - 3.3- 3.4-3.8  .. Creat improving   CHEST TUBE   .. 2nd chest tube placed on 1/3 because of worsening pntx   .. to be removed after trach or extubation which ever comes first   CHF.  .. On dobu started 1/3-->   OVERALL.  .. Trach if unweanable   .. Taper off dobu as guided by Dr Zapien   .. renal failure improving   .. complete meropenem   .. goc determination in progress       TIME SPENT   Over 39 minutes aggregate critical care time spent on encounter; activities included   direct patient care, counseling and/or coordinating care reviewing notes, lab data/ imaging , discussion with multidisciplinary team/ patient  /family and explaining in detail risks, benefits, alternatives  of the recommendations     BIPIN DE LA CRUZ 61 m 12/23/2022 1962 DR KELVIN VANESSA

## 2023-01-11 NOTE — PROGRESS NOTE ADULT - SUBJECTIVE AND OBJECTIVE BOX
INTERVAL HPI/OVERNIGHT EVENTS:  events noted    MEDICATIONS  (STANDING):  albuterol    90 MICROgram(s) HFA Inhaler 2 Puff(s) Inhalation every 6 hours  albuterol/ipratropium for Nebulization. 3 milliLiter(s) Nebulizer once  aMIOdarone    Tablet 200 milliGRAM(s) Oral daily  aspirin  chewable 81 milliGRAM(s) Oral daily  chlorhexidine 0.12% Liquid 15 milliLiter(s) Oral Mucosa every 12 hours  dextrose 5%. 1000 milliLiter(s) (100 mL/Hr) IV Continuous <Continuous>  dextrose 50% Injectable 25 Gram(s) IV Push once  dextrose 50% Injectable 12.5 Gram(s) IV Push once  dextrose 50% Injectable 25 Gram(s) IV Push once  DOBUTamine Infusion 5 MICROgram(s)/kG/Min (12.2 mL/Hr) IV Continuous <Continuous>  glucagon  Injectable 1 milliGRAM(s) IntraMuscular once  heparin   Injectable 5000 Unit(s) SubCutaneous every 8 hours  insulin lispro (ADMELOG) corrective regimen sliding scale   SubCutaneous every 6 hours  ipratropium 17 MICROgram(s) HFA Inhaler 1 Puff(s) Inhalation every 6 hours  nystatin Powder 1 Application(s) Topical two times a day  pantoprazole  Injectable 40 milliGRAM(s) IV Push daily  propofol Infusion 10 MICROgram(s)/kG/Min (4.9 mL/Hr) IV Continuous <Continuous>    MEDICATIONS  (PRN):  dextrose Oral Gel 15 Gram(s) Oral once PRN Blood Glucose LESS THAN 70 milliGRAM(s)/deciliter      Allergies    No Known Allergies    Intolerances    pork (Other)      Review of Systems:    General:  No wt loss, fevers, chills, night sweats,fatigue,   Eyes:  Good vision, no reported pain  ENT:  No sore throat, pain, runny nose, dysphagia  CV:  No pain, palpitatioins, hypo/hypertension  Resp:  No dyspnea, cough, tachypnea, wheezing  GI:  No pain, No nausea, No vomiting, No diarrhea, No constipatiion, No weight loss, No fever, No pruritis, No rectal bleeding, No tarry stools, No dysphagia,  :  No pain, bleeding, incontinence, nocturia  Muscle:  No pain, weakness  Neuro:  No weakness, tingling, memory problems  Psych:  No fatigue, insomnia, mood problems, depression  Endocrine:  No polyuria, polydypsia, cold/heat intolerance  Heme:  No petechiae, ecchymosis, easy bruisability  Skin:  No rash, tattoos, scars, edema      Vital Signs Last 24 Hrs  T(C): 36.9 (11 Jan 2023 08:52), Max: 37.1 (10 David 2023 12:00)  T(F): 98.4 (11 Jan 2023 08:52), Max: 98.8 (10 David 2023 12:00)  HR: 89 (11 Jan 2023 10:00) (88 - 109)  BP: 113/81 (11 Jan 2023 10:00) (113/81 - 135/100)  BP(mean): 91 (11 Jan 2023 10:00) (90 - 112)  RR: 20 (11 Jan 2023 10:00) (16 - 30)  SpO2: 100% (11 Jan 2023 10:00) (91% - 100%)    Parameters below as of 11 Jan 2023 08:00  Patient On (Oxygen Delivery Method): ventilator    O2 Concentration (%): 35    PHYSICAL EXAM:    Constitutional: NAD, well-developed  HEENT: EOMI, throat clear  Neck: No LAD, supple  Respiratory: CTA and P  Cardiovascular: S1 and S2, RRR, no M  Gastrointestinal: BS+, soft, NT/ND, neg HSM,  Extremities: No peripheral edema, neg clubing, cyanosis  Vascular: 2+ peripheral pulses  Neurological: A/O x 3, no focal deficits  Psychiatric: Normal mood, normal affect  Skin: No rashes      LABS:                        11.4   11.92 )-----------( 252      ( 11 Jan 2023 06:21 )             36.1     01-11    142  |  105  |  113<H>  ----------------------------<  174<H>  4.4   |  23  |  3.85<H>    Ca    9.7      11 Jan 2023 06:21  Phos  7.1     01-11  Mg     2.8     01-11    TPro  7.5  /  Alb  2.3<L>  /  TBili  0.8  /  DBili  x   /  AST  62<H>  /  ALT  67<H>  /  AlkPhos  374<H>  01-11          RADIOLOGY & ADDITIONAL TESTS:

## 2023-01-11 NOTE — PROGRESS NOTE ADULT - SUBJECTIVE AND OBJECTIVE BOX
[facility]    Allergies    No Known Allergies    Intolerances    pork (Other)      PAST MEDICAL & SURGICAL HISTORY:  Heart failure, systolic      CAD (coronary artery disease)      Hypertension      Nonischemic cardiomyopathy      COPD, moderate      2019 novel coronavirus disease (COVID-19)      Substance abuse      HLD (hyperlipidemia)      Cardiac LV ejection fraction 10-20%      AICD (automatic cardioverter/defibrillator) present      Cardiac LV ejection fraction 10-20%          FAMILY HISTORY:  FH: lung cancer        Home Medications:  acetaminophen 325 mg oral tablet: 2 tab(s) orally every 6 hours, As needed, Temp greater or equal to 38C (100.4F), Mild Pain (1 - 3) (23 Dec 2022 10:07)  apixaban 5 mg oral tablet: 1 tab(s) orally every 12 hours (23 Dec 2022 10:07)  Aquaphor Healing topical ointment: Apply topically to affected area once a day (23 Dec 2022 10:07)  ascorbic acid 500 mg oral tablet: 1 tab(s) orally once a day (23 Dec 2022 10:07)  Bacid (LAC) oral capsule: 2 cap(s) orally once a day (23 Dec 2022 10:07)  bumetanide 2 mg oral tablet: 1 tab(s) orally 2 times a day (23 Dec 2022 10:07)  gabapentin 100 mg oral capsule: 1 cap(s) orally 3 times a day (23 Dec 2022 10:07)  melatonin 3 mg oral tablet: 1 tab(s) orally once a day (at bedtime), As needed, Insomnia (23 Dec 2022 10:07)  Multiple Vitamins with Minerals oral tablet: 1 tab(s) orally once a day (23 Dec 2022 10:07)  pantoprazole 40 mg oral delayed release tablet: 1 tab(s) orally once a day (before a meal) (23 Dec 2022 10:07)  sacubitril-valsartan 24 mg-26 mg oral tablet: 1 tab(s) orally 2 times a day (23 Dec 2022 10:07)  simethicone 80 mg oral tablet: 2 tab(s) orally every 6 hours, As Needed (23 Dec 2022 10:07)  spironolactone 25 mg oral tablet: 1 tab(s) orally once a day (23 Dec 2022 10:07)  Symbicort 160 mcg-4.5 mcg/inh inhalation aerosol: 2 puff(s) inhaled 2 times a day (23 Dec 2022 10:07)  Ventolin HFA 90 mcg/inh inhalation aerosol: 2 puff(s) inhaled every 6 hours, As Needed (23 Dec 2022 10:07)      MEDICATIONS  (STANDING):  albuterol    90 MICROgram(s) HFA Inhaler 2 Puff(s) Inhalation every 6 hours  albuterol/ipratropium for Nebulization. 3 milliLiter(s) Nebulizer once  aMIOdarone    Tablet 200 milliGRAM(s) Oral daily  aspirin  chewable 81 milliGRAM(s) Oral daily  chlorhexidine 0.12% Liquid 15 milliLiter(s) Oral Mucosa every 12 hours  dextrose 5%. 1000 milliLiter(s) (100 mL/Hr) IV Continuous <Continuous>  dextrose 50% Injectable 25 Gram(s) IV Push once  dextrose 50% Injectable 12.5 Gram(s) IV Push once  dextrose 50% Injectable 25 Gram(s) IV Push once  DOBUTamine Infusion 5 MICROgram(s)/kG/Min (12.2 mL/Hr) IV Continuous <Continuous>  glucagon  Injectable 1 milliGRAM(s) IntraMuscular once  heparin   Injectable 5000 Unit(s) SubCutaneous every 8 hours  insulin lispro (ADMELOG) corrective regimen sliding scale   SubCutaneous every 6 hours  ipratropium 17 MICROgram(s) HFA Inhaler 1 Puff(s) Inhalation every 6 hours  nystatin Powder 1 Application(s) Topical two times a day  pantoprazole  Injectable 40 milliGRAM(s) IV Push daily  propofol Infusion 10 MICROgram(s)/kG/Min (4.9 mL/Hr) IV Continuous <Continuous>    MEDICATIONS  (PRN):  dextrose Oral Gel 15 Gram(s) Oral once PRN Blood Glucose LESS THAN 70 milliGRAM(s)/deciliter      Diet, NPO with Tube Feed:   Tube Feeding Modality: Nasogastric  Nepro with Carb Steady  Total Volume for 24 Hours (mL): 960  Continuous  Starting Tube Feed Rate mL per Hour: 20  Increase Tube Feed Rate by (mL): 10     Every 4 hours  Until Goal Tube Feed Rate (mL per Hour): 40  Tube Feed Duration (in Hours): 24  Tube Feed Start Time: 13:00  Free Water Flush  Pump   Rate (mL per Hour): 30 (12-26-22 @ 23:12) [Active]          Vital Signs Last 24 Hrs  T(C): 36.7 (11 Jan 2023 03:52), Max: 37.1 (10 David 2023 12:00)  T(F): 98.1 (11 Jan 2023 03:52), Max: 98.8 (10 David 2023 12:00)  HR: 98 (11 Jan 2023 06:00) (90 - 109)  BP: 117/86 (11 Jan 2023 06:00) (113/84 - 135/100)  BP(mean): 94 (11 Jan 2023 06:00) (90 - 112)  RR: 23 (11 Jan 2023 06:00) (16 - 30)  SpO2: 94% (11 Jan 2023 06:00) (91% - 100%)    Parameters below as of 11 Jan 2023 06:00  Patient On (Oxygen Delivery Method): ventilator    O2 Concentration (%): 35      01-10-23 @ 07:01  -  01-11-23 @ 07:00  --------------------------------------------------------  IN: 2313.3 mL / OUT: 775 mL / NET: 1538.3 mL        Mode: AC/ CMV (Assist Control/ Continuous Mandatory Ventilation), RR (machine): 20, TV (machine): 400, FiO2: 35, PEEP: 5, ITime: 1, MAP: 16, PIP: 46      LABS:                        11.4   11.92 )-----------( 252      ( 11 Jan 2023 06:21 )             36.1     01-11    142  |  105  |  113<H>  ----------------------------<  174<H>  4.4   |  23  |  3.85<H>    Ca    9.7      11 Jan 2023 06:21  Phos  7.1     01-11  Mg     2.8     01-11    TPro  7.5  /  Alb  2.3<L>  /  TBili  0.8  /  DBili  x   /  AST  62<H>  /  ALT  67<H>  /  AlkPhos  374<H>  01-11              WBC:  WBC Count: 11.92 K/uL (01-11 @ 06:21)  WBC Count: 16.61 K/uL (01-09 @ 06:46)  WBC Count: 14.82 K/uL (01-08 @ 06:34)      MICROBIOLOGY:  RECENT CULTURES:                  Sodium:  Sodium, Serum: 142 mmol/L (01-11 @ 06:21)  Sodium, Serum: 142 mmol/L (01-09 @ 06:46)  Sodium, Serum: 144 mmol/L (01-08 @ 06:34)  Sodium, Serum: 141 mmol/L (01-08 @ 00:52)      3.85 mg/dL 01-11 @ 06:21  3.46 mg/dL 01-09 @ 06:46  3.34 mg/dL 01-08 @ 06:34  3.48 mg/dL 01-08 @ 00:52      Hemoglobin:  Hemoglobin: 11.4 g/dL (01-11 @ 06:21)  Hemoglobin: 11.7 g/dL (01-09 @ 06:46)  Hemoglobin: 10.1 g/dL (01-08 @ 06:34)      Platelets: Platelet Count - Automated: 252 K/uL (01-11 @ 06:21)  Platelet Count - Automated: 291 K/uL (01-09 @ 06:46)  Platelet Count - Automated: 242 K/uL (01-08 @ 06:34)      LIVER FUNCTIONS - ( 11 Jan 2023 06:21 )  Alb: 2.3 g/dL / Pro: 7.5 g/dL / ALK PHOS: 374 U/L / ALT: 67 U/L DA / AST: 62 U/L / GGT: x                 RADIOLOGY & ADDITIONAL STUDIES:      MICROBIOLOGY:  RECENT CULTURES:

## 2023-01-11 NOTE — PROGRESS NOTE ADULT - ASSESSMENT
62M CAD, Dilated cardiomyopathy, S/p AICD, Afib/flutter, h/o substance abuse,  presented with acute hypoxic and hypercarbic respiratory failure associated with pneumothorax.      ptx  resp failure  arnoldo hx  CM  AICD  AF      ventilated  vs noted  labs reviewed    ethics consult noted - agree with conclusion - prognosis poor and 2 MD certification can designate DNR - [facility]    no immediate family  friends only on listed number  no decision making capacity from friends  may need guardianship and decision from 2 MD's

## 2023-01-11 NOTE — PROGRESS NOTE ADULT - SUBJECTIVE AND OBJECTIVE BOX
DOROTHY VOSS is a 62yMale , patient examined and chart reviewed.     INTERVAL HPI/ OVERNIGHT EVENTS:   Afebrile. Remains Vented sedated.  No sig change in status.      PAST MEDICAL & SURGICAL HISTORY:  Heart failure, systolic  CAD (coronary artery disease)  Hypertension  Nonischemic cardiomyopathy  COPD, moderate  2019 novel coronavirus disease (COVID-19)  Substance abuse  HLD (hyperlipidemia)  Cardiac LV ejection fraction 10-20%  AICD (automatic cardioverter/defibrillator) present  Cardiac LV ejection fraction 10-20%        For details regarding the patient's social history, family history, and other miscellaneous elements, please refer the initial infectious diseases consultation and/or the admitting history and physical examination for this admission.    ROS:  Unable to obtain due to : pt's condition    ALLERGIES  pork (Other)      Current inpatient medications :    ANTIBIOTICS/RELEVANT:      MEDICATIONS  (STANDING):  albuterol    90 MICROgram(s) HFA Inhaler 2 Puff(s) Inhalation every 6 hours  albuterol/ipratropium for Nebulization. 3 milliLiter(s) Nebulizer once  aMIOdarone    Tablet 200 milliGRAM(s) Oral daily  aspirin  chewable 81 milliGRAM(s) Oral daily  chlorhexidine 0.12% Liquid 15 milliLiter(s) Oral Mucosa every 12 hours  dextrose 5%. 1000 milliLiter(s) (100 mL/Hr) IV Continuous <Continuous>  dextrose 50% Injectable 25 Gram(s) IV Push once  dextrose 50% Injectable 12.5 Gram(s) IV Push once  dextrose 50% Injectable 25 Gram(s) IV Push once  DOBUTamine Infusion 5 MICROgram(s)/kG/Min (12.2 mL/Hr) IV Continuous <Continuous>  glucagon  Injectable 1 milliGRAM(s) IntraMuscular once  heparin   Injectable 5000 Unit(s) SubCutaneous every 8 hours  insulin lispro (ADMELOG) corrective regimen sliding scale   SubCutaneous every 6 hours  ipratropium 17 MICROgram(s) HFA Inhaler 1 Puff(s) Inhalation every 6 hours  nystatin Powder 1 Application(s) Topical two times a day  pantoprazole  Injectable 40 milliGRAM(s) IV Push daily  propofol Infusion 10 MICROgram(s)/kG/Min (4.9 mL/Hr) IV Continuous <Continuous>    MEDICATIONS  (PRN):  dextrose Oral Gel 15 Gram(s) Oral once PRN Blood Glucose LESS THAN 70 milliGRAM(s)/deciliter        Objective:  ICU Vital Signs Last 24 Hrs  T(C): 37.2 (11 Jan 2023 22:00), Max: 37.2 (11 Jan 2023 22:00)  T(F): 99 (11 Jan 2023 22:00), Max: 99 (11 Jan 2023 22:00)  HR: 84 (11 Jan 2023 20:24) (84 - 109)  BP: 90/67 (11 Jan 2023 20:00) (90/67 - 135/100)  BP(mean): 75 (11 Jan 2023 20:00) (75 - 112)  RR: 20 (11 Jan 2023 20:00) (16 - 25)  SpO2: 99% (11 Jan 2023 20:24) (94% - 100%)    O2 Parameters below as of 11 Jan 2023 20:24  Patient On (Oxygen Delivery Method): ventilator      O2 Concentration (%): 35  Mode: AC/ CMV (Assist Control/ Continuous Mandatory Ventilation), RR (machine): 20, TV (machine): 400, FiO2: 35, PEEP: 5, ITime: 1, MAP: 10, PIP: 15    Physical Exam:  General: vented sedated +NGT  Neck: supple, trachea midline  Lungs: decreased no wheeze/rhonchi Left chest tubes x 2  Cardiovascular: regular rate and rhythm, S1 S2  Abdomen: soft, nontender,  bowel sounds normal  Neurological: sedated  Skin: no rash  Extremities: +edema        LABS:                        11.4   11.92 )-----------( 252      ( 11 Jan 2023 06:21 )             36.1   01-11    142  |  105  |  113<H>  ----------------------------<  174<H>  4.4   |  23  |  3.85<H>    Ca    9.7      11 Jan 2023 06:21  Phos  7.1     01-11  Mg     2.8     01-11    TPro  7.5  /  Alb  2.3<L>  /  TBili  0.8  /  DBili  x   /  AST  62<H>  /  ALT  67<H>  /  AlkPhos  374<H>  01-11        MICROBIOLOGY:  Culture - Sputum . (12.24.22 @ 17:43)    Gram Stain:   Numerous polymorphonuclear leukocytes per low power field  No Squamous epithelial cells per low power field  Rare Gram Positive Rods seen per oil power field    -  Amikacin: S <=16    -  Aztreonam: S 8    -  Cefepime: S <=2    -  Ceftazidime: S 4    -  Ciprofloxacin: S <=0.25    -  Gentamicin: S 4    -  Imipenem: S <=1    -  Levofloxacin: S <=0.5    -  Meropenem: S <=1    -  Piperacillin/Tazobactam: S <=8    -  Tobramycin: S <=2    Specimen Source: ET Tube ET Tube    Culture Results:   Rare Pseudomonas aeruginosa  Normal Respiratory Cathy absent    Organism Identification: Pseudomonas aeruginosa    Organism: Pseudomonas aeruginosa    Method Type: BAY    Culture - Urine (12.23.22 @ 11:16)    -  Tobramycin: S <=2    -  Amoxicillin/Clavulanic Acid: S <=8/4    -  Ampicillin: R 16 These ampicillin results predict results for amoxicillin    -  Ampicillin/Sulbactam: S <=4/2 Enterobacter, Klebsiella aerogenes, Citrobacter, and Serratia may develop resistance during prolonged therapy (3-4 days)    -  Amikacin: S <=16    -  Cefazolin: S <=2    -  Cefoxitin: S <=8    -  Aztreonam: S <=4    -  Cefepime: S <=2    -  Imipenem: S <=1    -  Levofloxacin: S <=0.5    -  Ceftriaxone: S <=1 Enterobacter, Klebsiella aerogenes, Citrobacter, and Serratia may develop resistance during prolonged therapy    -  Ciprofloxacin: S <=0.25    -  Trimethoprim/Sulfamethoxazole: S <=0.5/9.5    -  Meropenem: S <=1    -  Nitrofurantoin: S <=32 Should not be used to treat pyelonephritis    -  Ertapenem: S <=0.5    -  Gentamicin: S <=2    -  Piperacillin/Tazobactam: S <=8    Specimen Source: Clean Catch Clean Catch (Midstream)    Culture Results:   >100,000 CFU/ml Klebsiella oxytoca/Raoultella ornithinolytica    Organism Identification: Klebsiella oxytoca /Raoutella ornithinolytica    Organism: Klebsiella oxytoca /Raoutella ornithinolytica    Method Type: BAY      Culture - Blood (12.23.22 @ 14:06)    Specimen Source: .Blood Blood-Peripheral    Culture Results:   No Growth Final      RADIOLOGY & ADDITIONAL STUDIES:    ACC: [id number] EXAM:  CT CHEST                          PROCEDURE DATE:  12/29/2022          INTERPRETATION:  HISTORY: Admitting Dxs: J96.01 RESP DISTRESS    EXAMINATION: CT CHEST was performed without IV contrast.    COMPARISON: 12/3/2022.    FINDINGS:    AIRWAYS, LUNGS, PLEURA: Satisfactory positioning of endotracheal tube.   Mild central airways secretions. Unchanged small loculated left   pneumothorax. Small chronic loculated right pleural effusion unchanged.   Right basilar and right middlelobe atelectasis. Emphysema.    Left chest tube in place with distal tip along the medial and upper   pleural space.    MEDIASTINUM: Cardiomegaly. No pericardial effusion. Thoracic aorta normal   caliber.  No large mediastinal lymph nodes. ICD lead terminates in the   right ventricle.    IMAGED ABDOMEN: Enteric catheter terminates in the stomach.   Cholelithiasis. Decreased abdominal extraluminal gas.    SOFT TISSUES: Decreased subcutaneous soft tissue emphysema.    BONES: Unremarkable.      IMPRESSION:.    Unchanged small loculated left pneumothorax.    Unchanged small loculated and chronic right pleural effusion.    Decreased subcutaneous soft tissue emphysema and extraluminal abdominal   gas.      ACC: [id number] EXAM:  XR CHEST PORTABLE IMMED 1V                        ACC: [id number] EXAM:  XR CHEST PORTABLE URGENT 1V                        ACC: [id number] EXAM:  XR CHEST PORTABLE URGENT 1V                        ACC: [id number] EXAM:  XR CHEST PORTABLE IMMED 1V                          PROCEDURE DATE:  01/03/2023          INTERPRETATION:  TIME OF EXAM: January 2, 2023 at 11:46 PM and 11:47 PM.    CLINICAL INFORMATION: Status post cardiac arrest. Respiratory distress   and abnormal chest sounds.    COMPARISON:  CT scan of the chest from December 29, 2022.    TECHNIQUE:   AP Portable chest x-ray.    INTERPRETATION:    The cardiac silhouette is enlarged. There is a left chest wall ICD with   intact lead with tip in expected region of the right ventricle. The   generator obscures part of the left lung.  The mediastinum is not accurately evaluated on these images.  ET tube tip is approximately 7.4 cm above the jennie.  Enteric tube tip is near the GE junction with side port in the distal   esophagus.  Left chest tube not significantly changed in position.  No significant change in small loculated right pleural effusion and right   mid and lower lung opacities. Question small pneumothorax associated with   the pleural effusion versus interposed aerated lung. Question tiny right   apical pneumothorax versus apical bulla.  Large left pneumothorax, increased in size. Concern for tension as there   is inversion of the left hemidiaphragm and widening of the rib   interspaces on the left compared to the right. Atelectasis of underlying   lung.  No acute bony abnormality.    AP portable chest x-ray from January 3, 2023 at 12:05 AM and 12:06 AM:    CLINICAL INFORMATION: Pneumothorax.    INTERPRETATION:    ET tube tip is 5.7 cm above the jennie. Enteric tube and left chest tube   unchanged in position.  Large left pneumothorax with concerns for tension, not significantly   changed.  Right-sided findings not significantly changed.    AP portable chest x-ray from January 3, 2023 at 12:53 AM and 12:54 AM:    CLINICAL INFORMATION: Replaced left chest tube.    INTERPRETATION:    ET tube tip is 6.2 cm above the jennie.  Enteric tube tip near GE junction with side port in the distal esophagus.  Left chest tube with tip projecting over the aortic knob.  Large left pneumothorax with concerns for tension, not significantly   changed. Continued atelectasis of underlying lung.  New left subcutaneous emphysema.  Small loculated right pleural effusion with likely associated passive   atelectasis is not significantly changed. Once again a small pneumothorax   component is not excluded. In addition, continued question of minimal   right apical pneumothorax versus apical bulla.    AP portable chest x-ray from January 3, 2023 at 1:35 AM:    CLINICAL INFORMATION: Chest tube.    INTERPRETATION:    ET tube tip approximately 4 cm above the jennie.  Enteric tube tip is likely in the stomach with the side port near the GE   junction.  Left chest wall ICD again seen.  Small loculated right pleural effusion with right mid and lower lung   opacities, not significantly changed. Previous concern for small   associated pneumothorax not seen. Question tiny right apical pneumothorax   versus apical bulla, unchanged.  Left chest tube unchanged in position. Left pneumothorax has resolved.   Left subcutaneous emphysema is slightly decreased. No left pleural   effusion.        IMPRESSION:  Left chest tube unchanged in position on the most recent   image with resolution of left pneumothorax. Slight decrease in left   subcutaneous emphysema.    Enteric tube tip likely in the stomach with side port near the GE   junction. Consider advancing the enteric tube. Discussed with Dr. Dobbins   with read back at 9:03 AM on January 3, 2023 by Dr. Sarmiento.    Small loculated right pleural effusion with right mid and lower lung   opacities, possibly atelectasis, although infection not excluded, not   significantly changed.    Continued question tiny right apical pneumothorax versus apical bulla.      Assessment :  Pt is a 62M CAD, Dilated cardiomyopathy, S/p AICD, Afib/flutter, h/o substance abuse, CKD baseline creat approx 1.4 last admitted to [facility] with MSSA bacteremia, and CHF.    Sent from [facility] with acute hypoxic respiratory failure. Was initially on BiPAP then became hypoxic.  Anesthesia intubated patient.  On post intubation Xray pneumothorax evident.  Patient with PEA arrest.  Chest tube placed by ED physician.  S/p PEA arrest  AHRF requiring intubation  Aspiration PNA   Loculated pleural effusions  - imaging reviewed  - BCx negative   - Sputum Cx -- Pseudomonas  - UCx -- Klebsiella  - WBC stable, afebrile  - s/p zosyn x10d course completed 1/1/2023  - pneumothorax  with Left CT x 2  -1/3 WBC up trending and hypothermic- restarted on Antibiotic- Meropenam as there was a concern for recurrent pneumonia  Off Meropenam on 1/10  WBC downtrending    Plan:  - Monitor off antibiotics  - Sp Meropenam x 7 days ended 1/10/23  - trend temps/WBC--stable  - maintain aspiration precautions  - chest tubes per pulm critical care  - vent management per ICU care  - GOC per primary team  - Overall prog poor      Continue with present regiment.  Appropriate use of antibiotics and adverse effects reviewed.      Critical care > 35 minutes were spent in direct patient care reviewing notes, medications ,labs data/ imaging , discussion with multidisciplinary team.    Thank you for allowing me to participate in care of your patient .    Kelley Phan MD  Infectious Disease  [phone]

## 2023-01-11 NOTE — PROGRESS NOTE ADULT - SUBJECTIVE AND OBJECTIVE BOX
Subjective: no change in status. Remains on Dobutamine gtt. FiO2 35%.   UO not recorded.   Stable BP  Scant drainage per CTs.       MEDICATIONS  (STANDING):  albuterol    90 MICROgram(s) HFA Inhaler 2 Puff(s) Inhalation every 6 hours  albuterol/ipratropium for Nebulization. 3 milliLiter(s) Nebulizer once  aMIOdarone    Tablet 200 milliGRAM(s) Oral daily  aspirin  chewable 81 milliGRAM(s) Oral daily  chlorhexidine 0.12% Liquid 15 milliLiter(s) Oral Mucosa every 12 hours  dextrose 5%. 1000 milliLiter(s) (100 mL/Hr) IV Continuous <Continuous>  dextrose 50% Injectable 25 Gram(s) IV Push once  dextrose 50% Injectable 12.5 Gram(s) IV Push once  dextrose 50% Injectable 25 Gram(s) IV Push once  DOBUTamine Infusion 5 MICROgram(s)/kG/Min (12.2 mL/Hr) IV Continuous <Continuous>  glucagon  Injectable 1 milliGRAM(s) IntraMuscular once  heparin   Injectable 5000 Unit(s) SubCutaneous every 8 hours  insulin lispro (ADMELOG) corrective regimen sliding scale   SubCutaneous every 6 hours  ipratropium 17 MICROgram(s) HFA Inhaler 1 Puff(s) Inhalation every 6 hours  nystatin Powder 1 Application(s) Topical two times a day  pantoprazole  Injectable 40 milliGRAM(s) IV Push daily  propofol Infusion 10 MICROgram(s)/kG/Min (4.9 mL/Hr) IV Continuous <Continuous>    MEDICATIONS  (PRN):  dextrose Oral Gel 15 Gram(s) Oral once PRN Blood Glucose LESS THAN 70 milliGRAM(s)/deciliter          T(C): 36.6 (01-11-23 @ 12:24), Max: 37 (01-10-23 @ 21:14)  HR: 89 (01-11-23 @ 13:34) (88 - 109)  BP: 113/79 (01-11-23 @ 12:00) (113/79 - 135/100)  RR: 16 (01-11-23 @ 12:00) (16 - 30)  SpO2: 100% (01-11-23 @ 13:34) (91% - 100%)  Wt(kg): --        I&O's Detail    10 David 2023 07:01  -  11 Jan 2023 07:00  --------------------------------------------------------  IN:    DOBUTamine: 280.6 mL    Enteral Tube Flush: 1000 mL    Nepro with Carb Steady: 920 mL    Propofol: 112.7 mL  Total IN: 2313.3 mL    OUT:    Chest Tube (mL): 0 mL    Chest Tube (mL): 0 mL    Indwelling Catheter - Urethral (mL): 700 mL    Rectal Tube (mL): 75 mL  Total OUT: 775 mL    Total NET: 1538.3 mL      11 Jan 2023 07:01  -  11 Jan 2023 15:20  --------------------------------------------------------  IN:    DOBUTamine: 73.2 mL    Nepro with Carb Steady: 240 mL    Propofol: 29.4 mL  Total IN: 342.6 mL    OUT:  Total OUT: 0 mL    Total NET: 342.6 mL          Mode: AC/ CMV (Assist Control/ Continuous Mandatory Ventilation)  RR (machine): 20  TV (machine): 400  FiO2: 35  PEEP: 5  ITime: 1  MAP: 13  PIP: 17       PHYSICAL EXAM:    GENERAL: vented, FiO2 35%.   CHEST/LUNG: dec BS on L  HEART: S1S2  ABDOMEN: Soft, distended,   EXTREMITIES:  mild pedal edema.       LABS:  CBC Full  -  ( 11 Jan 2023 06:21 )  WBC Count : 11.92 K/uL  RBC Count : 3.75 M/uL  Hemoglobin : 11.4 g/dL  Hematocrit : 36.1 %  Platelet Count - Automated : 252 K/uL  Mean Cell Volume : 96.3 fl  Mean Cell Hemoglobin : 30.4 pg  Mean Cell Hemoglobin Concentration : 31.6 gm/dL  Auto Neutrophil # : x  Auto Lymphocyte # : x  Auto Monocyte # : x  Auto Eosinophil # : x  Auto Basophil # : x  Auto Neutrophil % : x  Auto Lymphocyte % : x  Auto Monocyte % : x  Auto Eosinophil % : x  Auto Basophil % : x    01-11    142  |  105  |  113<H>  ----------------------------<  174<H>  4.4   |  23  |  3.85<H>    Ca    9.7      11 Jan 2023 06:21  Phos  7.1     01-11  Mg     2.8     01-11    TPro  7.5  /  Alb  2.3<L>  /  TBili  0.8  /  DBili  x   /  AST  62<H>  /  ALT  67<H>  /  AlkPhos  374<H>  01-11        Impression:  1.  DERRICK on CKD-3 (Baseline creatinine 1.3-1.5): Ischemic ATN, slowly recovering   2.  Acute respiratory failure and PEA arrest  3.  S/p large left sided tension pneumothorax  4.  Chronic systolic heart failure    Recommend:  Continue supportive care  No dialytic indications

## 2023-01-11 NOTE — PROGRESS NOTE ADULT - SUBJECTIVE AND OBJECTIVE BOX
Chief Complaint: Respiratory failure    Interval Events: No events overnight.    Review of Systems:  Unable to obtain    Physical Exam:  Vital Signs Last 24 Hrs  T(C): 36.9 (11 Jan 2023 08:52), Max: 37.1 (10 David 2023 12:00)  T(F): 98.4 (11 Jan 2023 08:52), Max: 98.8 (10 David 2023 12:00)  HR: 91 (11 Jan 2023 09:00) (88 - 109)  BP: 114/83 (11 Jan 2023 09:00) (113/84 - 135/100)  BP(mean): 92 (11 Jan 2023 09:00) (90 - 112)  RR: 19 (11 Jan 2023 09:00) (16 - 30)  SpO2: 100% (11 Jan 2023 09:00) (91% - 100%)  Parameters below as of 11 Jan 2023 08:00  Patient On (Oxygen Delivery Method): ventilator  O2 Concentration (%): 35  General: Sedated  HEENT: Intubated  Neck: No JVD, no carotid bruit  Lungs: CTAB  CV: RRR, nl S1/S2, no M/R/G  Abdomen: S/NT/ND, +BS  Extremities: No LE edema, no cyanosis  Neuro: AAOx0  Skin: No rash    Labs:    01-11    142  |  105  |  113<H>  ----------------------------<  174<H>  4.4   |  23  |  3.85<H>    Ca    9.7      11 Jan 2023 06:21  Phos  7.1     01-11  Mg     2.8     01-11    TPro  7.5  /  Alb  2.3<L>  /  TBili  0.8  /  DBili  x   /  AST  62<H>  /  ALT  67<H>  /  AlkPhos  374<H>  01-11                        11.4   11.92 )-----------( 252      ( 11 Jan 2023 06:21 )             36.1       ECG/Telemetry: Sinus rhythm negative...

## 2023-01-11 NOTE — PROGRESS NOTE ADULT - ASSESSMENT
The patient is a 62 year old male with a history of CAD, chronic systolic heart failure s/p ICD, atrial flutter s/p DCCV, substance abuse, CKD who is admitted with respiratory failure in the setting of tension pneumothorax complicated by cardiac arrest.    Plan:  - ECG with sinus tachycardia and known LBBB  - Echo 2/22 with severely reduced LV (EF 10%) and RV function, mod MR, mod TR, mild pulm HTN  - Not on beta blocker as the patient is on dobutamine. Not a candidate for ACEI/ARB/ARNI at the current time due to renal function.  - Poor renal function - hold apixaban  - Hold bumetanide and spironolactone due to DERRICK  - Continue amiodarone 200 mg daily  - Continue aspirin 81 mg daily  - Continue dobutamine 5 mcg/kg/min - will continue at this dose until plans for trach, etc. and attempt to wean after  - Dobutamine dose should not be lowered for NSVT  - Mechanical ventilation  - ICU care  - Overall poor prognosis. Comfort care would be most appropriate. However, if plan is for continued aggressive care, would evaluate for trach.

## 2023-01-12 NOTE — PROGRESS NOTE ADULT - ASSESSMENT
62M CAD, Dilated cardiomyopathy, S/p AICD, Afib/flutter, h/o substance abuse, CKD baseline creat approx 1.4 last admitted to [facility] with MSSA bacteremia, and CHF.  Sent from [facility] with acute hypoxic respiratory failure/PEA arrest.  Initial CT Head - No acute pathology.  Repeat CT head 12/29 - : No acute intracranial hemorrhage, mass effect, or shift of the midline structures. Similar-appearing mild chronic white matter microvascular type changes.  Pt seem to follow some commands when not seadated. Moes left hand and foot on command on occasion. No movements seen on right side.  MRI Brain when feasible.  HbA1c - 6.4 on 12/24  LDL - 98  - Has Elevated LFTs - Lipitor will not be used at this point.  NIHSS - can't be accessed accurately.  On ASA 81  Renal failure. Cr 4.35 now  Elevated LFTs.  Severe left ventricular systolic dysfunction. EF 10 %  Pt with multi organ failure. Poor overall prognosis.  Would follow.

## 2023-01-12 NOTE — PROGRESS NOTE ADULT - SUBJECTIVE AND OBJECTIVE BOX
DOROTHY VOSS is a 62yMale , patient examined and chart reviewed.     INTERVAL HPI/ OVERNIGHT EVENTS:   Afebrile. Remains Vented sedated.  No sig change in status.  Critically ill.      PAST MEDICAL & SURGICAL HISTORY:  Heart failure, systolic  CAD (coronary artery disease)  Hypertension  Nonischemic cardiomyopathy  COPD, moderate  2019 novel coronavirus disease (COVID-19)  Substance abuse  HLD (hyperlipidemia)  Cardiac LV ejection fraction 10-20%  AICD (automatic cardioverter/defibrillator) present  Cardiac LV ejection fraction 10-20%        For details regarding the patient's social history, family history, and other miscellaneous elements, please refer the initial infectious diseases consultation and/or the admitting history and physical examination for this admission.    ROS:  Unable to obtain due to : pt's condition    ALLERGIES  pork (Other)      Current inpatient medications :    ANTIBIOTICS/RELEVANT:    MEDICATIONS  (STANDING):  albuterol    90 MICROgram(s) HFA Inhaler 2 Puff(s) Inhalation every 6 hours  albuterol/ipratropium for Nebulization. 3 milliLiter(s) Nebulizer once  aMIOdarone    Tablet 200 milliGRAM(s) Oral daily  aspirin  chewable 81 milliGRAM(s) Oral daily  chlorhexidine 0.12% Liquid 15 milliLiter(s) Oral Mucosa every 12 hours  dextrose 5%. 1000 milliLiter(s) (100 mL/Hr) IV Continuous <Continuous>  dextrose 50% Injectable 25 Gram(s) IV Push once  dextrose 50% Injectable 12.5 Gram(s) IV Push once  dextrose 50% Injectable 25 Gram(s) IV Push once  DOBUTamine Infusion 5 MICROgram(s)/kG/Min (12.2 mL/Hr) IV Continuous <Continuous>  glucagon  Injectable 1 milliGRAM(s) IntraMuscular once  heparin   Injectable 5000 Unit(s) SubCutaneous every 8 hours  insulin lispro (ADMELOG) corrective regimen sliding scale   SubCutaneous every 6 hours  ipratropium 17 MICROgram(s) HFA Inhaler 1 Puff(s) Inhalation every 6 hours  nystatin Powder 1 Application(s) Topical two times a day  pantoprazole  Injectable 40 milliGRAM(s) IV Push daily  propofol Infusion 10 MICROgram(s)/kG/Min (4.9 mL/Hr) IV Continuous <Continuous>    MEDICATIONS  (PRN):  dextrose Oral Gel 15 Gram(s) Oral once PRN Blood Glucose LESS THAN 70 milliGRAM(s)/deciliter      Objective:  ICU Vital Signs Last 24 Hrs  T(C): 36.8 (12 Jan 2023 12:40), Max: 37.2 (11 Jan 2023 22:00)  T(F): 98.2 (12 Jan 2023 12:40), Max: 99 (11 Jan 2023 22:00)  HR: 85 (12 Jan 2023 13:00) (75 - 99)  BP: 127/85 (12 Jan 2023 13:00) (90/67 - 127/85)  BP(mean): 99 (12 Jan 2023 13:00) (75 - 100)  RR: 21 (12 Jan 2023 13:00) (16 - 25)  SpO2: 100% (12 Jan 2023 13:00) (94% - 100%)    O2 Parameters below as of 12 Jan 2023 12:52  Patient On (Oxygen Delivery Method): ventilator,35        O2 Concentration (%): 35  Mode: AC/ CMV (Assist Control/ Continuous Mandatory Ventilation), RR (machine): 20, TV (machine): 400, FiO2: 35, PEEP: 5, ITime: 1, MAP: 10, PIP: 15    Physical Exam:  General: vented sedated +NGT  Neck: supple, trachea midline  Lungs: decreased no wheeze/rhonchi Left chest tubes x 2  Cardiovascular: regular rate and rhythm, S1 S2  Abdomen: soft, nontender,  bowel sounds normal  Neurological: sedated  Skin: no rash  Extremities: +edema        LABS:                        10.2   11.80 )-----------( 211      ( 12 Jan 2023 06:02 )             32.3   01-12    141  |  104  |  127<H>  ----------------------------<  125<H>  4.5   |  22  |  4.35<H>    Ca    8.9      12 Jan 2023 06:02  Phos  7.4     01-12  Mg     2.1     01-12    TPro  8.3  /  Alb  2.1<L>  /  TBili  1.0  /  DBili  x   /  AST  51<H>  /  ALT  48  /  AlkPhos  301<H>  01-12      MICROBIOLOGY:  Culture - Sputum . (12.24.22 @ 17:43)    Gram Stain:   Numerous polymorphonuclear leukocytes per low power field  No Squamous epithelial cells per low power field  Rare Gram Positive Rods seen per oil power field    -  Amikacin: S <=16    -  Aztreonam: S 8    -  Cefepime: S <=2    -  Ceftazidime: S 4    -  Ciprofloxacin: S <=0.25    -  Gentamicin: S 4    -  Imipenem: S <=1    -  Levofloxacin: S <=0.5    -  Meropenem: S <=1    -  Piperacillin/Tazobactam: S <=8    -  Tobramycin: S <=2    Specimen Source: ET Tube ET Tube    Culture Results:   Rare Pseudomonas aeruginosa  Normal Respiratory Cathy absent    Organism Identification: Pseudomonas aeruginosa    Organism: Pseudomonas aeruginosa    Method Type: BAY    Culture - Urine (12.23.22 @ 11:16)    -  Tobramycin: S <=2    -  Amoxicillin/Clavulanic Acid: S <=8/4    -  Ampicillin: R 16 These ampicillin results predict results for amoxicillin    -  Ampicillin/Sulbactam: S <=4/2 Enterobacter, Klebsiella aerogenes, Citrobacter, and Serratia may develop resistance during prolonged therapy (3-4 days)    -  Amikacin: S <=16    -  Cefazolin: S <=2    -  Cefoxitin: S <=8    -  Aztreonam: S <=4    -  Cefepime: S <=2    -  Imipenem: S <=1    -  Levofloxacin: S <=0.5    -  Ceftriaxone: S <=1 Enterobacter, Klebsiella aerogenes, Citrobacter, and Serratia may develop resistance during prolonged therapy    -  Ciprofloxacin: S <=0.25    -  Trimethoprim/Sulfamethoxazole: S <=0.5/9.5    -  Meropenem: S <=1    -  Nitrofurantoin: S <=32 Should not be used to treat pyelonephritis    -  Ertapenem: S <=0.5    -  Gentamicin: S <=2    -  Piperacillin/Tazobactam: S <=8    Specimen Source: Clean Catch Clean Catch (Midstream)    Culture Results:   >100,000 CFU/ml Klebsiella oxytoca/Raoultella ornithinolytica    Organism Identification: Klebsiella oxytoca /Raoutella ornithinolytica    Organism: Klebsiella oxytoca /Raoutella ornithinolytica    Method Type: BAY      Culture - Blood (12.23.22 @ 14:06)    Specimen Source: .Blood Blood-Peripheral    Culture Results:   No Growth Final      RADIOLOGY & ADDITIONAL STUDIES:    ACC: [id number] EXAM:  CT CHEST                          PROCEDURE DATE:  12/29/2022          INTERPRETATION:  HISTORY: Admitting Dxs: J96.01 RESP DISTRESS    EXAMINATION: CT CHEST was performed without IV contrast.    COMPARISON: 12/3/2022.    FINDINGS:    AIRWAYS, LUNGS, PLEURA: Satisfactory positioning of endotracheal tube.   Mild central airways secretions. Unchanged small loculated left   pneumothorax. Small chronic loculated right pleural effusion unchanged.   Right basilar and right middlelobe atelectasis. Emphysema.    Left chest tube in place with distal tip along the medial and upper   pleural space.    MEDIASTINUM: Cardiomegaly. No pericardial effusion. Thoracic aorta normal   caliber.  No large mediastinal lymph nodes. ICD lead terminates in the   right ventricle.    IMAGED ABDOMEN: Enteric catheter terminates in the stomach.   Cholelithiasis. Decreased abdominal extraluminal gas.    SOFT TISSUES: Decreased subcutaneous soft tissue emphysema.    BONES: Unremarkable.      IMPRESSION:.    Unchanged small loculated left pneumothorax.    Unchanged small loculated and chronic right pleural effusion.    Decreased subcutaneous soft tissue emphysema and extraluminal abdominal   gas.      ACC: [id number] EXAM:  XR CHEST PORTABLE IMMED 1V                        ACC: [id number] EXAM:  XR CHEST PORTABLE URGENT 1V                        ACC: [id number] EXAM:  XR CHEST PORTABLE URGENT 1V                        ACC: [id number] EXAM:  XR CHEST PORTABLE IMMED 1V                          PROCEDURE DATE:  01/03/2023          INTERPRETATION:  TIME OF EXAM: January 2, 2023 at 11:46 PM and 11:47 PM.    CLINICAL INFORMATION: Status post cardiac arrest. Respiratory distress   and abnormal chest sounds.    COMPARISON:  CT scan of the chest from December 29, 2022.    TECHNIQUE:   AP Portable chest x-ray.    INTERPRETATION:    The cardiac silhouette is enlarged. There is a left chest wall ICD with   intact lead with tip in expected region of the right ventricle. The   generator obscures part of the left lung.  The mediastinum is not accurately evaluated on these images.  ET tube tip is approximately 7.4 cm above the jennie.  Enteric tube tip is near the GE junction with side port in the distal   esophagus.  Left chest tube not significantly changed in position.  No significant change in small loculated right pleural effusion and right   mid and lower lung opacities. Question small pneumothorax associated with   the pleural effusion versus interposed aerated lung. Question tiny right   apical pneumothorax versus apical bulla.  Large left pneumothorax, increased in size. Concern for tension as there   is inversion of the left hemidiaphragm and widening of the rib   interspaces on the left compared to the right. Atelectasis of underlying   lung.  No acute bony abnormality.    AP portable chest x-ray from January 3, 2023 at 12:05 AM and 12:06 AM:    CLINICAL INFORMATION: Pneumothorax.    INTERPRETATION:    ET tube tip is 5.7 cm above the jennie. Enteric tube and left chest tube   unchanged in position.  Large left pneumothorax with concerns for tension, not significantly   changed.  Right-sided findings not significantly changed.    AP portable chest x-ray from January 3, 2023 at 12:53 AM and 12:54 AM:    CLINICAL INFORMATION: Replaced left chest tube.    INTERPRETATION:    ET tube tip is 6.2 cm above the jennie.  Enteric tube tip near GE junction with side port in the distal esophagus.  Left chest tube with tip projecting over the aortic knob.  Large left pneumothorax with concerns for tension, not significantly   changed. Continued atelectasis of underlying lung.  New left subcutaneous emphysema.  Small loculated right pleural effusion with likely associated passive   atelectasis is not significantly changed. Once again a small pneumothorax   component is not excluded. In addition, continued question of minimal   right apical pneumothorax versus apical bulla.    AP portable chest x-ray from January 3, 2023 at 1:35 AM:    CLINICAL INFORMATION: Chest tube.    INTERPRETATION:    ET tube tip approximately 4 cm above the jennie.  Enteric tube tip is likely in the stomach with the side port near the GE   junction.  Left chest wall ICD again seen.  Small loculated right pleural effusion with right mid and lower lung   opacities, not significantly changed. Previous concern for small   associated pneumothorax not seen. Question tiny right apical pneumothorax   versus apical bulla, unchanged.  Left chest tube unchanged in position. Left pneumothorax has resolved.   Left subcutaneous emphysema is slightly decreased. No left pleural   effusion.        IMPRESSION:  Left chest tube unchanged in position on the most recent   image with resolution of left pneumothorax. Slight decrease in left   subcutaneous emphysema.    Enteric tube tip likely in the stomach with side port near the GE   junction. Consider advancing the enteric tube. Discussed with Dr. Dobbins   with read back at 9:03 AM on January 3, 2023 by Dr. Sarmiento.    Small loculated right pleural effusion with right mid and lower lung   opacities, possibly atelectasis, although infection not excluded, not   significantly changed.    Continued question tiny right apical pneumothorax versus apical bulla.      Assessment :  Pt is a 62M CAD, Dilated cardiomyopathy, S/p AICD, Afib/flutter, h/o substance abuse, CKD baseline creat approx 1.4 last admitted to [facility] with MSSA bacteremia, and CHF.    Sent from [facility] with acute hypoxic respiratory failure. Was initially on BiPAP then became hypoxic.  Anesthesia intubated patient.  On post intubation Xray pneumothorax evident.  Patient with PEA arrest.  Chest tube placed by ED physician.  S/p PEA arrest  AHRF requiring intubation  Aspiration PNA   Loculated pleural effusions  - imaging reviewed  - BCx negative   - Sputum Cx -- Pseudomonas  - UCx -- Klebsiella  - WBC stable, afebrile  - s/p zosyn x10d course completed 1/1/2023  - pneumothorax  with Left CT x 2  -1/3 WBC up trending and hypothermic- restarted on Antibiotic- Meropenam as there was a concern for recurrent pneumonia  Off Meropenam since 1/10  WBC downtrending    Plan:  - Monitor off antibiotics  - Sp Meropenam x 7 days ended 1/10/23  - trend temps/WBC--stable  - maintain aspiration precautions  - chest tubes per pulm critical care  - vent management per ICU care  - GOC per primary team  - Overall prog poor      Continue with present regiment.  Appropriate use of antibiotics and adverse effects reviewed.      Critical care > 35 minutes were spent in direct patient care reviewing notes, medications ,labs data/ imaging , discussion with multidisciplinary team.    Thank you for allowing me to participate in care of your patient .    Kelley Phan MD  Infectious Disease  [phone]

## 2023-01-12 NOTE — PROGRESS NOTE ADULT - SUBJECTIVE AND OBJECTIVE BOX
Patient is a 62y old  Male who presents with a chief complaint of resp failure (12 Jan 2023 14:50)      BRIEF HOSPITAL COURSE: 62M with HTN, dilated CM s/p AICD, CKD, opioid / substance abuse, AFib/Flutter admitted with acute hypoxic respiratory failure requiring intubation. Course complicated by L PTX s/p intubation and PEA arrest with chest tube emergently placed by ED and subsequent ROSC. Admitted to [facility] with course further complicated with Aspiration PNA, shock, and DERRICK on CKD.    Events last 24 hours: Remains on Dobutamine 5 mcg/kg/min. DERRICK continues to worsen, not a candidate for HD.    PAST MEDICAL & SURGICAL HISTORY:  Heart failure, systolic      CAD (coronary artery disease)      Hypertension      Nonischemic cardiomyopathy      COPD, moderate      2019 novel coronavirus disease (COVID-19)      Substance abuse      HLD (hyperlipidemia)      Cardiac LV ejection fraction 10-20%      AICD (automatic cardioverter/defibrillator) present      Cardiac LV ejection fraction 10-20%          Review of Systems: Intubated and sedated      Medications:    aMIOdarone    Tablet 200 milliGRAM(s) Oral daily  DOBUTamine Infusion 5 MICROgram(s)/kG/Min IV Continuous <Continuous>    albuterol    90 MICROgram(s) HFA Inhaler 2 Puff(s) Inhalation every 6 hours  albuterol/ipratropium for Nebulization. 3 milliLiter(s) Nebulizer once  ipratropium 17 MICROgram(s) HFA Inhaler 1 Puff(s) Inhalation every 6 hours    propofol Infusion 10 MICROgram(s)/kG/Min IV Continuous <Continuous>      aspirin  chewable 81 milliGRAM(s) Oral daily  heparin   Injectable 5000 Unit(s) SubCutaneous every 8 hours    pantoprazole  Injectable 40 milliGRAM(s) IV Push daily      dextrose 50% Injectable 25 Gram(s) IV Push once  dextrose 50% Injectable 12.5 Gram(s) IV Push once  dextrose 50% Injectable 25 Gram(s) IV Push once  dextrose Oral Gel 15 Gram(s) Oral once PRN  glucagon  Injectable 1 milliGRAM(s) IntraMuscular once  insulin lispro (ADMELOG) corrective regimen sliding scale   SubCutaneous every 6 hours    dextrose 5%. 1000 milliLiter(s) IV Continuous <Continuous>      chlorhexidine 0.12% Liquid 15 milliLiter(s) Oral Mucosa every 12 hours  nystatin Powder 1 Application(s) Topical two times a day        Mode: AC/ CMV (Assist Control/ Continuous Mandatory Ventilation)  RR (machine): 20  TV (machine): 400  FiO2: 35  PEEP: 5  ITime: 1  MAP: 11  PIP: 26      ICU Vital Signs Last 24 Hrs  T(C): 36.1 (12 Jan 2023 16:50), Max: 37.2 (11 Jan 2023 22:00)  T(F): 97 (12 Jan 2023 16:50), Max: 99 (11 Jan 2023 22:00)  HR: 80 (12 Jan 2023 18:00) (75 - 99)  BP: 121/87 (12 Jan 2023 18:00) (90/67 - 131/90)  BP(mean): 98 (12 Jan 2023 18:00) (75 - 103)  ABP: --  ABP(mean): --  RR: 17 (12 Jan 2023 18:00) (15 - 25)  SpO2: 98% (12 Jan 2023 18:00) (95% - 100%)    O2 Parameters below as of 12 Jan 2023 16:00  Patient On (Oxygen Delivery Method): ventilator    O2 Concentration (%): 35            I&O's Detail    11 Jan 2023 07:01  -  12 Jan 2023 07:00  --------------------------------------------------------  IN:    DOBUTamine: 268.4 mL    Enteral Tube Flush: 250 mL    Nepro with Carb Steady: 840 mL    Propofol: 107.8 mL  Total IN: 1466.2 mL    OUT:    Chest Tube (mL): 0 mL    Chest Tube (mL): 0 mL    Indwelling Catheter - Urethral (mL): 250 mL    Rectal Tube (mL): 275 mL  Total OUT: 525 mL    Total NET: 941.2 mL      12 Jan 2023 07:01  -  12 Jan 2023 19:16  --------------------------------------------------------  IN:    DOBUTamine: 146.4 mL    Enteral Tube Flush: 250 mL    Nepro with Carb Steady: 480 mL    Propofol: 53.9 mL  Total IN: 930.3 mL    OUT:    Chest Tube (mL): 0 mL    Chest Tube (mL): 0 mL    Indwelling Catheter - Urethral (mL): 150 mL    Rectal Tube (mL): 100 mL  Total OUT: 250 mL    Total NET: 680.3 mL            LABS:                        10.2   11.80 )-----------( 211      ( 12 Jan 2023 06:02 )             32.3     01-12    141  |  104  |  127<H>  ----------------------------<  125<H>  4.5   |  22  |  4.35<H>    Ca    8.9      12 Jan 2023 06:02  Phos  7.4     01-12  Mg     2.1     01-12    TPro  8.3  /  Alb  2.1<L>  /  TBili  1.0  /  DBili  x   /  AST  51<H>  /  ALT  48  /  AlkPhos  301<H>  01-12          CAPILLARY BLOOD GLUCOSE      POCT Blood Glucose.: 128 mg/dL (12 Jan 2023 17:15)        CULTURES:      Physical Examination:    General: No acute distress. Sedated    HEENT: Pupils equal, reactive to light.  Symmetric.    PULM: Breathing comfortably on ventilator, good BS B/L with no Rales or Rhonchi, no significant sputum production    CVS: Regular rate and rhythm, no murmurs, rubs, or gallops    ABD: BS+ Soft, nondistended, nontender, no masses    EXT: No edema, nontender    SKIN: Warm and well perfused, no rashes noted.

## 2023-01-12 NOTE — PROGRESS NOTE ADULT - ASSESSMENT
The patient is a 62 year old male with a history of CAD, chronic systolic heart failure s/p ICD, atrial flutter s/p DCCV, substance abuse, CKD who is admitted with respiratory failure in the setting of tension pneumothorax complicated by cardiac arrest.    Plan:  - ECG with sinus tachycardia and known LBBB  - Echo 2/22 with severely reduced LV (EF 10%) and RV function, mod MR, mod TR, mild pulm HTN  - Not on beta blocker as the patient is on dobutamine. Not a candidate for ACEI/ARB/ARNI at the current time due to renal function.  - Poor renal function - hold apixaban  - Hold bumetanide and spironolactone due to DERRICK  - Continue amiodarone 200 mg daily  - Continue aspirin 81 mg daily  - Continue dobutamine 5 mcg/kg/min - will continue at this dose until plans for trach, etc. and attempt to wean after  - Dobutamine dose should not be lowered for NSVT  - Mechanical ventilation  - ICU care  - Overall poor prognosis. Comfort care would be most appropriate. Awaiting next steps from palliative care et al. as there is no NOK.

## 2023-01-12 NOTE — PROGRESS NOTE ADULT - SUBJECTIVE AND OBJECTIVE BOX
INTERVAL HPI/OVERNIGHT EVENTS:  No new overnight event.  No N/V/D.  MEDICATIONS  (STANDING):  albuterol    90 MICROgram(s) HFA Inhaler 2 Puff(s) Inhalation every 6 hours  albuterol/ipratropium for Nebulization. 3 milliLiter(s) Nebulizer once  aMIOdarone    Tablet 200 milliGRAM(s) Oral daily  aspirin  chewable 81 milliGRAM(s) Oral daily  chlorhexidine 0.12% Liquid 15 milliLiter(s) Oral Mucosa every 12 hours  dextrose 5%. 1000 milliLiter(s) (100 mL/Hr) IV Continuous <Continuous>  dextrose 50% Injectable 25 Gram(s) IV Push once  dextrose 50% Injectable 12.5 Gram(s) IV Push once  dextrose 50% Injectable 25 Gram(s) IV Push once  DOBUTamine Infusion 5 MICROgram(s)/kG/Min (12.2 mL/Hr) IV Continuous <Continuous>  glucagon  Injectable 1 milliGRAM(s) IntraMuscular once  heparin   Injectable 5000 Unit(s) SubCutaneous every 8 hours  insulin lispro (ADMELOG) corrective regimen sliding scale   SubCutaneous every 6 hours  ipratropium 17 MICROgram(s) HFA Inhaler 1 Puff(s) Inhalation every 6 hours  nystatin Powder 1 Application(s) Topical two times a day  pantoprazole  Injectable 40 milliGRAM(s) IV Push daily  propofol Infusion 10 MICROgram(s)/kG/Min (4.9 mL/Hr) IV Continuous <Continuous>    MEDICATIONS  (PRN):  dextrose Oral Gel 15 Gram(s) Oral once PRN Blood Glucose LESS THAN 70 milliGRAM(s)/deciliter      Allergies    No Known Allergies    Intolerances    pork (Other)      Review of Systems:    General:  No wt loss, fevers, chills, night sweats,fatigue,   Eyes:  Good vision, no reported pain  ENT:  No sore throat, pain, runny nose, dysphagia  CV:  No pain, palpitatioins, hypo/hypertension  Resp:  No dyspnea, cough, tachypnea, wheezing  GI:  No pain, No nausea, No vomiting, No diarrhea, No constipatiion, No weight loss, No fever, No pruritis, No rectal bleeding, No tarry stools, No dysphagia,  :  No pain, bleeding, incontinence, nocturia  Muscle:  No pain, weakness  Neuro:  No weakness, tingling, memory problems  Psych:  No fatigue, insomnia, mood problems, depression  Endocrine:  No polyuria, polydypsia, cold/heat intolerance  Heme:  No petechiae, ecchymosis, easy bruisability  Skin:  No rash, tattoos, scars, edema      Vital Signs Last 24 Hrs  T(C): 36.6 (12 Jan 2023 08:45), Max: 37.2 (11 Jan 2023 22:00)  T(F): 97.8 (12 Jan 2023 08:45), Max: 99 (11 Jan 2023 22:00)  HR: 75 (12 Jan 2023 10:00) (75 - 99)  BP: 111/75 (12 Jan 2023 10:00) (90/67 - 125/89)  BP(mean): 87 (12 Jan 2023 10:00) (75 - 100)  RR: 20 (12 Jan 2023 10:00) (16 - 25)  SpO2: 100% (12 Jan 2023 10:00) (94% - 100%)    Parameters below as of 12 Jan 2023 08:00  Patient On (Oxygen Delivery Method): ventilator    O2 Concentration (%): 35    PHYSICAL EXAM:    Constitutional: NAD, well-developed  HEENT: EOMI, throat clear  Neck: No LAD, supple  Respiratory: CTA and P  Cardiovascular: S1 and S2, RRR, no M  Gastrointestinal: BS+, soft, NT/ND, neg HSM,  Extremities: No peripheral edema, neg clubing, cyanosis  Vascular: 2+ peripheral pulses  Neurological: A/O x 3, no focal deficits  Psychiatric: Normal mood, normal affect  Skin: No rashes      LABS:                        10.2   11.80 )-----------( 211      ( 12 Jan 2023 06:02 )             32.3     01-12    141  |  104  |  127<H>  ----------------------------<  125<H>  4.5   |  22  |  4.35<H>    Ca    8.9      12 Jan 2023 06:02  Phos  7.4     01-12  Mg     2.1     01-12    TPro  8.3  /  Alb  2.1<L>  /  TBili  1.0  /  DBili  x   /  AST  51<H>  /  ALT  48  /  AlkPhos  301<H>  01-12          RADIOLOGY & ADDITIONAL TESTS:

## 2023-01-12 NOTE — CHART NOTE - NSCHARTNOTESELECT_GEN_ALL_CORE
Event Note
Nutrition Services

## 2023-01-12 NOTE — CHART NOTE - NSCHARTNOTEFT_GEN_A_CORE
Assessment:  As per H&P "The pt is a History of Present Illness: 62M CAD, Dilated cardiomyopathy, S/p AICD, Afib/flutter, h/o substance abuse, CKD baseline creat approx 1.4 last admitted to [facility] with MSSA bacteremia, and CHF.  Sent from [facility]F with acute hypoxic respiratory failure.  Was initially on BiPAP then became hypoxic.  Anesthesia intubated patient.  On post intubation Xray pneumothorax evident.  Patient with PEA arrest.  Chest tube placed by ED physician. ROSC obtained. Patient unable to give further history."      Pt seen for nutrition follow-up at bedside in SPCU, remains intubated. Pt ordered for NGT; Pulmocare upon admission, then changed to Nepro on , now feeds at goal rate of 40 ml/hr (24hrs) providing 960 ml of formula, 1728 kcal, 78 gm of protein and 698 ml of free water, 30 ml free water flush qhr= 720 ml additional water. Current regimen is meeting pt's EER. Per RN, pt is tolerating current tube feed regimen, last BM today 10/31. Pertinent medications/nutrition labs reviewed; noted elevated BUN, Cr, receiving IVF and dexmedetomidine in house; also noted -175 x24hr, pt no hx of DM, A1c 6.4% indicating fair-poor glycemic control, in house ordered for lispro sliding scale to aid in glucose management. Plan to continue with current tube feed regimen. Per chart review, "maintain respiratory support  to consider tracheostomy if unweanable." Monitor GI tolerance to feeds, RD to remain available to adjust EN formulary, volume/rate as needed/upon request.       Factors impacting intake: [ ] none [ ] nausea  [ ] vomiting [ ] diarrhea [ ] constipation  [ ]chewing problems [ ] swallowing issues  [ x] other: NPO with Tube feed    Diet Prescription: Diet, NPO with Tube Feed:   Tube Feeding Modality: Nasogastric  Nepro with Carb Steady  Total Volume for 24 Hours (mL): 960  Continuous  Starting Tube Feed Rate {mL per Hour}: 20  Increase Tube Feed Rate by (mL): 10     Every 4 hours  Until Goal Tube Feed Rate (mL per Hour): 40  Tube Feed Duration (in Hours): 24  Tube Feed Start Time: 13:00  Free Water Flush  Pump   Rate (mL per Hour): 30 (22 @ 23:12)    Intake: feeds at goal    Daily Weight in k.9 (30 Dec 2022 04:06)  Weight in k.6 (29 Dec 2022 04:00)  Weight in k.6 (28 Dec 2022 04:00)  Weight in k.8 (27 Dec 2022 04:06)  Weight in k.7 (26 Dec 2022 04:06)    -- wt trending upward likely related to fluid shift vs adequate intake, will continue to monitor weight trends as able    Pertinent Medications: MEDICATIONS  (STANDING):  albuterol    90 MICROgram(s) HFA Inhaler 2 Puff(s) Inhalation every 6 hours  albuterol/ipratropium for Nebulization. 3 milliLiter(s) Nebulizer once  aMIOdarone    Tablet 200 milliGRAM(s) Oral daily  aspirin  chewable 81 milliGRAM(s) Oral daily  chlorhexidine 0.12% Liquid 15 milliLiter(s) Oral Mucosa every 12 hours  dexMEDEtomidine Infusion 0.2 MICROgram(s)/kG/Hr (4.08 mL/Hr) IV Continuous <Continuous>  dextrose 5%. 1000 milliLiter(s) (100 mL/Hr) IV Continuous <Continuous>  dextrose 50% Injectable 25 Gram(s) IV Push once  dextrose 50% Injectable 12.5 Gram(s) IV Push once  dextrose 50% Injectable 25 Gram(s) IV Push once  glucagon  Injectable 1 milliGRAM(s) IntraMuscular once  heparin   Injectable 5000 Unit(s) SubCutaneous every 12 hours  insulin lispro (ADMELOG) corrective regimen sliding scale   SubCutaneous every 6 hours  ipratropium 17 MICROgram(s) HFA Inhaler 1 Puff(s) Inhalation every 6 hours  lactulose Syrup 20 Gram(s) Oral two times a day  mupirocin 2% Nasal 1 Application(s) Both Nostrils two times a day  pantoprazole  Injectable 40 milliGRAM(s) IV Push daily  piperacillin/tazobactam IVPB.. 3.375 Gram(s) IV Intermittent every 12 hours  sodium chloride 0.45%. 1000 milliLiter(s) (75 mL/Hr) IV Continuous <Continuous>    MEDICATIONS  (PRN):  dextrose Oral Gel 15 Gram(s) Oral once PRN Blood Glucose LESS THAN 70 milliGRAM(s)/deciliter    Pertinent Labs:  Na138 mmol/L Glu 165 mg/dL<H> K+ 3.5 mmol/L Cr  3.88 mg/dL<H>  mg/dL<H> 12- Phos 4.4 mg/dL  Alb 1.9 g/dL<L>     CAPILLARY BLOOD GLUCOSE      POCT Blood Glucose.: 175 mg/dL (31 Dec 2022 06:35)  POCT Blood Glucose.: 121 mg/dL (31 Dec 2022 00:17)  POCT Blood Glucose.: 126 mg/dL (30 Dec 2022 17:26)      Edema per flowsheets: +2 right arm  Skin: pressure injury stage 2 to sacrum      Estimated Needs:   [x ] no change since previous assessment  [ ] recalculated:     Previous Nutrition Diagnosis:   [ ] Inadequate Energy Intake [ ]Inadequate Oral Intake [ ] Excessive Energy Intake   [ ] Underweight [ x] Increased Nutrient Needs [ ] Overweight/Obesity [ ] Altered Nutrition related lab values  [ ] Altered GI Function [ ] Unintended Weight Loss [ ] Food & Nutrition Related Knowledge Deficit [ ] Malnutrition     Nutrition Diagnosis is [ x] ongoing  [ ] resolved [ ] not applicable     New Nutrition Diagnosis: [x ] not applicable       Interventions:  Plan to continue with current tube feed regimen.  Recommend  [ ] Change Diet To:  [ ] Nutrition Supplement  [ ] Nutrition Support  [ ] Other:     Monitoring and Evaluation:   [X ] Intake [ x ] Tolerance to diet prescription [ x ] weights [ x ] labs[ x ] follow up per protocol  [ ] other:
Assessment: As per H&P "The pt is a History of Present Illness: 62M CAD, Dilated cardiomyopathy, S/p AICD, Afib/flutter, h/o substance abuse, CKD baseline creat approx 1.4 last admitted to [facility] with MSSA bacteremia, and CHF.  Sent from [facility]F with acute hypoxic respiratory failure.  Was initially on BiPAP then became hypoxic.  Anesthesia intubated patient.  On post intubation Xray pneumothorax evident.  Patient with PEA arrest.  Chest tube placed by ED physician. ROSC obtained. Patient unable to give further history."      Pt seen for nutrition follow-up at bedside in SPCU, remains intubated. Pt ordered for NGT; Pulmocare upon admission, then changed to Nepro on , now feeds at goal rate of 40 ml/hr (24hrs) providing 960 ml of formula, 1728 kcal, 78 gm of protein and 698 ml of free water, 30 ml free water flush qhr= 720 ml additional water. Current regimen is meeting pt's EER. Pertinent medications/nutrition labs reviewed; noted elevated BUN, Cr, hypernatremia, receiving LR in house; also noted -185 x24hr, pt no hx of DM, A1c 6.4% indicating fair-poor glycemic control, in house ordered for lispro sliding scale to aid in glucose management. Plan to continue with current tube feed regimen. Monitor GI tolerance to feeds, RD to remain available to adjust EN formulary, volume/rate as needed/upon request.     Factors impacting intake: [ ] none [ ] nausea  [ ] vomiting [ ] diarrhea [ ] constipation  [ ]chewing problems [ ] swallowing issues  [x ] other: NPO with NGT feed    Diet Prescription: Diet, NPO with Tube Feed:   Tube Feeding Modality: Nasogastric  Nepro with Carb Steady  Total Volume for 24 Hours (mL): 960  Continuous  Starting Tube Feed Rate {mL per Hour}: 20  Increase Tube Feed Rate by (mL): 10     Every 4 hours  Until Goal Tube Feed Rate (mL per Hour): 40  Tube Feed Duration (in Hours): 24  Tube Feed Start Time: 13:00  Free Water Flush  Pump   Rate (mL per Hour): 30 (22 @ 23:12)    Intake: feeds at goal    Daily Weight in k.8 (27 Dec 2022 04:06)  Weight in k.7 (26 Dec 2022 04:06)  Weight in k.3 (24 Dec 2022 03:00)  Weight in k.8 (23 Dec 2022 17:01)    -- wt is trending downward, likely r/t fluid shift, will continue to monitor wt trends as able    Pertinent Medications: MEDICATIONS  (STANDING):  albuterol    90 MICROgram(s) HFA Inhaler 2 Puff(s) Inhalation every 6 hours  albuterol/ipratropium for Nebulization. 3 milliLiter(s) Nebulizer once  aspirin  chewable 81 milliGRAM(s) Oral daily  chlorhexidine 0.12% Liquid 15 milliLiter(s) Oral Mucosa every 12 hours  dextrose 5%. 1000 milliLiter(s) (100 mL/Hr) IV Continuous <Continuous>  dextrose 50% Injectable 25 Gram(s) IV Push once  dextrose 50% Injectable 12.5 Gram(s) IV Push once  dextrose 50% Injectable 25 Gram(s) IV Push once  glucagon  Injectable 1 milliGRAM(s) IntraMuscular once  heparin   Injectable 5000 Unit(s) SubCutaneous every 12 hours  insulin lispro (ADMELOG) corrective regimen sliding scale   SubCutaneous every 6 hours  ipratropium 17 MICROgram(s) HFA Inhaler 1 Puff(s) Inhalation every 6 hours  lactated ringers. 1000 milliLiter(s) (100 mL/Hr) IV Continuous <Continuous>  pantoprazole  Injectable 40 milliGRAM(s) IV Push daily  piperacillin/tazobactam IVPB.. 3.375 Gram(s) IV Intermittent every 8 hours    MEDICATIONS  (PRN):  dextrose Oral Gel 15 Gram(s) Oral once PRN Blood Glucose LESS THAN 70 milliGRAM(s)/deciliter  fentaNYL    Injectable 100 MICROGram(s) IV Push every 4 hours PRN MV synchr    Pertinent Labs:  Na147 mmol/L<H> Glu 198 mg/dL<H> K+ 4.7 mmol/L Cr  4.20 mg/dL<H>  mg/dL<H>  Phos 4.4 mg/dL  Alb 2.7 g/dL<L>     CAPILLARY BLOOD GLUCOSE      POCT Blood Glucose.: 175 mg/dL (27 Dec 2022 11:18)  POCT Blood Glucose.: 159 mg/dL (27 Dec 2022 05:54)  POCT Blood Glucose.: 163 mg/dL (26 Dec 2022 23:08)  POCT Blood Glucose.: 155 mg/dL (26 Dec 2022 17:14)      Edema per flowsheets: +2 bl arms, bl hands  Skin: Pressure Injury 1: scrotum, Stage II      Estimated Needs:   [x ] no change since previous assessment  [ ] recalculated:     Previous Nutrition Diagnosis:   [ ] Inadequate Energy Intake [ ]Inadequate Oral Intake [ ] Excessive Energy Intake   [ ] Underweight [ x] Increased Nutrient Needs [ ] Overweight/Obesity [ ] Altered Nutrition related lab values  [ ] Altered GI Function [ ] Unintended Weight Loss [ ] Food & Nutrition Related Knowledge Deficit [ ] Malnutrition     Nutrition Diagnosis is [x ] ongoing  [ ] resolved [ ] not applicable     New Nutrition Diagnosis: [ x] not applicable       Interventions: Plan to continue with current tube feed regimen.  Recommend  [ ] Change Diet To:  [ ] Nutrition Supplement  [ ] Nutrition Support  [ ] Other:     Monitoring and Evaluation:   [X ] Intake [ x ] Tolerance to diet prescription [ x ] weights [ x ] labs[ x ] follow up per protocol  [ ] other:
Nutrition Follow Up Note  Patient seen for: Nutrition follow up     Chart reviewed, events noted: As per chart "The pt is a 63 yo M CAD, Dilated cardiomyopathy, S/p AICD, Afib/flutter, h/o substance abuse, CKD baseline creat approx 1.4 last admitted to [facility] with MSSA bacteremia, and CHF.  Sent from [facility]F with acute hypoxic respiratory failure.  Was initially on BiPAP then became hypoxic.  Anesthesia intubated patient.  On post intubation Xray pneumothorax evident.  Patient with PEA arrest.  Chest tube placed by ED physician. ROSC obtained. Patient unable to give further history."      Pt seen at bedside in SPCU, remains intubated and sedated on propofol. Pt receiving EN via NGT ordered for Nepro with Carb Steady at goal rate of 40ml/hr (x24hrs), provides 960 ml of formula, 1728 kcal, 78 gm of protein and 698 ml of free water with free water flush of 30ml/hr. At time of visit feeding pump running at goal rate of 40 ml/hr, per RN noted with good tolerance and no acute GI distress reported at this time. Per hospitalist "SW to evaluate for next steps in either trach/peg/possible HD if renal function worsen vs comfort care." Pertinent medications/nutrition labs reviewed; propofol infusing at 5.6 ml/hr (if infused x24hrs propofol will provide 147.8kcal), POCT blood glucose ranges x24hrs (103-181) on admelog in-house. Continue to monitor renal indices and electrolytes, continue with current tube feed regimen. Monitor GI tolerance to feeds, RD to follow up to continue to monitor pt's nutrition status and will remain available to adjust EN formulary, volume/rate as needed/upon request.     Source: [] Patient       [x] EMR        [x] RN        [] Family at bedside       [] Other:    -If unable to interview patient: [x] Trach/Vent/BiPAP  [] Disoriented/confused/inappropriate to interview    Diet Order:   Diet, NPO with Tube Feed:   Tube Feeding Modality: Nasogastric  Nepro with Carb Steady  Total Volume for 24 Hours (mL): 960  Continuous  Starting Tube Feed Rate {mL per Hour}: 20  Increase Tube Feed Rate by (mL): 10     Every 4 hours  Until Goal Tube Feed Rate (mL per Hour): 40  Tube Feed Duration (in Hours): 24  Tube Feed Start Time: 13:00  Free Water Flush  Pump   Rate (mL per Hour): 30 (22)    - Is current order appropriate/adequate? [x] Yes  []  No:     Weights:   Daily Weight in k.1 (), Weight in k.6 (), Weight in k.6 (01-10), Weight in k.3 (), Weight in k.8 (), Weight in k ()- Will continue to monitor weights and trend as available.     Nutritionally Pertinent MEDICATIONS  (STANDING):  aMIOdarone    Tablet  dextrose 5%.  dextrose 50% Injectable  dextrose 50% Injectable  dextrose 50% Injectable  DOBUTamine Infusion  glucagon  Injectable  insulin lispro (ADMELOG) corrective regimen sliding scale  pantoprazole  Injectable    Pertinent Labs:  @ 06:02: Na 141, <H>, Cr 4.35<H>, <H>, K+ 4.5, Phos 7.4<H>, Mg 2.1, Alk Phos 301<H>, ALT/SGPT 48, AST/SGOT 51<H>, HbA1c --    A1C with Estimated Average Glucose Result: 6.4 % (22 @ 07:03)    Finger Sticks:  POCT Blood Glucose.: 105 mg/dL ( @ 11:54)  POCT Blood Glucose.: 119 mg/dL ( @ 05:04)  POCT Blood Glucose.: 150 mg/dL ( @ 23:04)  POCT Blood Glucose.: 117 mg/dL ( @ 17:17)      Skin per nursing documentation: Sacrum Stage 2   Edema: +2 left/right arm edema on ()    Estimated Needs:   [x] no change since previous assessment  [] recalculated:     Previous Nutrition Diagnosis:  Increased nutrients needs   Nutrition Diagnosis is: [x] ongoing  [] resolved [] not applicable     New Nutrition Diagnosis: [x] Not applicable    Nutrition Care Plan:  [x] In Progress  [] Achieved  [] Not applicable    Nutrition Interventions:     Education Provided:       [] Yes:  [x] No:        Recommendations:         [x] Continue current TF regimen and monitor GI tolerance to feeds      [x] RD to remain available to adjust EN formulary, volume/rate as needed/upon request.            Monitoring and Evaluation:   Continue to monitor nutritional intake, tolerance to diet prescription, weights, labs, skin integrity    RD remains available upon request and will follow up per protocol
Nutrition Follow Up Note  Patient seen for: Nutrition follow up     Chart reviewed, events noted: As per chart "The pt is a 63 yo M CAD, Dilated cardiomyopathy, S/p AICD, Afib/flutter, h/o substance abuse, CKD baseline creat approx 1.4 last admitted to [facility] with MSSA bacteremia, and CHF.  Sent from [facility] with acute hypoxic respiratory failure.  Was initially on BiPAP then became hypoxic.  Anesthesia intubated patient.  On post intubation Xray pneumothorax evident.  Patient with PEA arrest.  Chest tube placed by ED physician. ROSC obtained. Patient unable to give further history."    1/8  Pt seen at bedside in SPCU, remains intubate and sedated on propofol. Pt receiving EN via NGT ordered for Nepro with Carb Steady at goal rate of 40ml/hr (x24hrs), provides 960 ml of formula, 1728 kcal, 78 gm of protein and 698 ml of free water with free water flush of 30ml/hr. Per documents pt noticed to have cough/increased secretion on (1/6), NG tube dislodged after cough, GI following.At time of visit, feeding pump running at goal rate of 40ml/hr with no noted acute GI distress. As per flow sheets last BM on (1/7). Of note, palliative care following  "ethics consult noted - agree with conclusion - prognosis poor and 2 MD certification can designate DNR - Comfort Care", refer to documents for further details. Pertinent medications/nutrition labs reviewed; propofol infusing at 7.3 ml/hr (if infused x24hrs propofol will provide 192.7kcal), POCT blood glucose ranges x24hrs (102-146) on admelog in-house. Continue to monitor renal indices and electrolytes, continue with current tube feed regimen. Monitor GI tolerance to feeds, RD to follow up to continue to monitor pt's nutrition status and will remain available to adjust EN formulary, volume/rate as needed/upon request.     Source: [] Patient       [x] EMR        [] RN        [] Family at bedside       [] Other:    -If unable to interview patient: [x] Trach/Vent/BiPAP  [] Disoriented/confused/inappropriate to interview    Diet Order:   Diet, NPO with Tube Feed:   Tube Feeding Modality: Nasogastric  Nepro with Carb Steady  Total Volume for 24 Hours (mL): 960  Continuous  Starting Tube Feed Rate {mL per Hour}: 20  Increase Tube Feed Rate by (mL): 10     Every 4 hours  Until Goal Tube Feed Rate (mL per Hour): 40  Tube Feed Duration (in Hours): 24  Tube Feed Start Time: 13:00  Free Water Flush  Pump   Rate (mL per Hour): 30 (12-26-22)    - Is current order appropriate/adequate? [x] Yes  []  No:     Weights: 200.1 lbs (1/8), 200.6 lbs (1/6), 205.9 (1/5), 207 lbs (1/4), 199 lbs (1/3)- Will continue to monitor weights and trend as available.     Nutritionally Pertinent MEDICATIONS  (STANDING):  aMIOdarone    Tablet  dextrose 5%.  dextrose 50% Injectable  dextrose 50% Injectable  dextrose 50% Injectable  DOBUTamine Infusion  glucagon  Injectable  insulin lispro (ADMELOG) corrective regimen sliding scale  meropenem  IVPB  pantoprazole  Injectable    Pertinent Labs: 01-08 @ 06:34: Na 144, BUN 88<H>, Cr 3.34<H>, <H>, K+ 3.8, Phos 4.9<H>, Mg --, Alk Phos 346<H>, ALT/SGPT 72<H>, AST/SGOT 50<H>, HbA1c --  01-08 @ 00:52: Na 141, BUN 90<H>, Cr 3.48<H>, <H>, K+ 3.6, Phos 4.8<H>, Mg 2.3, Alk Phos --, ALT/SGPT --, AST/SGOT --, HbA1c --    A1C with Estimated Average Glucose Result: 6.4 % (12-24-22 @ 07:03)    Finger Sticks:  POCT Blood Glucose.: 146 mg/dL (01-08 @ 11:24)  POCT Blood Glucose.: 112 mg/dL (01-08 @ 06:31)  POCT Blood Glucose.: 112 mg/dL (01-08 @ 00:11)  POCT Blood Glucose.: 108 mg/dL (01-07 @ 17:17)      Skin per nursing documentation: Sacrum stage 2   Edema: as per flow sheets pt with +2 generalized and +3 left arm; right arm edema on (1/8)    Estimated Needs:   [x] no change since previous assessment  [] recalculated:     Previous Nutrition Diagnosis: Increased nutrients needs   Nutrition Diagnosis is: [x] ongoing  [] resolved [] not applicable     New Nutrition Diagnosis: [x] Not applicable    Nutrition Care Plan:  [x] In Progress  [] Achieved  [] Not applicable    Nutrition Interventions:     Education Provided:       [] Yes:  [x] No:        Recommendations:         [x] Monitor GI tolerance to feeds, RD to remain available to adjust EN formulary, volume/rate as needed/upon request.      [x] Monitor provision of propofol      [x] RD to remain available    Monitoring and Evaluation:   Continue to monitor nutritional intake, tolerance to diet prescription, weights, labs, skin integrity      RD remains available upon request and will follow up per protocol
Nutrition Follow Up Chart Note  Patient seen for: TF recommendations     Pt seen at bedside in SPCU intubated, pt presents from [facility] with acute respiratory failure. Pt ordered for NGT; Pulmocare at goal rate of 30ml/hr x24 hrs provides 760 ml of formula 1080 kcal, 45 gm of protein and 569 ml free water. Recommend changing formula to Glucerna 1.5 initiate at 20 ml/hr and advance 10 ml/hr q4 hrs until goal rate of 40 ml/hr (24hrs) to provide 960 ml of formula, 1440 kcal, 79 gm of protein and 729 ml of free water, additional free water per MD discretion. Monitor GI tolerance to feeds, RD to remain available to adjust EN formulary, volume/rate as needed/upon request.     Current Diet Order:   Diet, NPO with Tube Feed:   Tube Feeding Modality: Nasogastric  Pulmocare  Total Volume for 24 Hours (mL): 720  Continuous  Starting Tube Feed Rate {mL per Hour}: 30  Until Goal Tube Feed Rate (mL per Hour): 30  Tube Feed Duration (in Hours): 24  Tube Feed Start Time: 18:00 (12-24-22)    - Is current order appropriate/adequate? [] Yes  [x]  No: See recommendations below:     Recommendations:         [x] Recommend changing formula to Glucerna 1.5 initiate at 20 ml/hr and advance 10 ml/hr q4 hrs until goal rate of 40 ml/hr (24hrs) to provide 960 ml of formula, 1440 kcal, 79 gm of protein and 729 ml of free water, additional free water per MD discretion.     [x] RD to remain available to adjust EN formulary, volume/rate as needed/upon request.            Monitoring and Evaluation:   Continue to monitor nutritional intake, tolerance to diet prescription, weights, labs, skin integrity    RD remains available upon request and will follow up per protocol
Nutrition Follow Up Note  Patient seen for: Nutrition follow up     Chart reviewed, events noted: As per chart "The pt is a 61 yo M CAD, Dilated cardiomyopathy, S/p AICD, Afib/flutter, h/o substance abuse, CKD baseline creat approx 1.4 last admitted to [facility] with MSSA bacteremia, and CHF.  Sent from [facility] with acute hypoxic respiratory failure.  Was initially on BiPAP then became hypoxic.  Anesthesia intubated patient.  On post intubation Xray pneumothorax evident.  Patient with PEA arrest.  Chest tube placed by ED physician. ROSC obtained. Patient unable to give further history."    Interim event:  -Per documents pt with large left sided tension pneumothorax s/p chest tube x 2 on (1/3)      Pt seen at bedside in SPCU, remains intubated presently on NGT. Pt ordered for Nepro with goal rate of 40ml/hr (x24hrs), provides 960 ml of formula, 1728 kcal, 78 gm of protein and 698 ml of free water with free water flush of 30ml/hr. Per observation feeding pump running at 20ml/hr, per RN TF recently initiated and noted with good tolerance. No acute GI distress reported at this time; per flow sheets last BM on (). Per documents pt may need PEG placement, GI following. Pertinent medications/nutrition labs reviewed; noted elevated BUN, Cr worsening of renal indices, per documents "ARF likely progressing to ESRD. May need dialysis in next 24 hours", nephro following. Continue to monitor renal indices and electrolytes, continue with current tube feed regimen. Monitor GI tolerance to feeds, RD to follow up to continue to monitor pt's nutrition status and will remain available to adjust EN formulary, volume/rate as needed/upon request.     Source: [] Patient       [x] EMR        [x] RN        [] Family at bedside       [] Other:    -If unable to interview patient: [x] Intubated/Vent/BiPAP  [] Disoriented/confused/inappropriate to interview    Diet Order:   Diet, NPO with Tube Feed:   Tube Feeding Modality: Nasogastric  Nepro with Carb Steady  Total Volume for 24 Hours (mL): 960  Continuous  Starting Tube Feed Rate {mL per Hour}: 20  Increase Tube Feed Rate by (mL): 10     Every 4 hours  Until Goal Tube Feed Rate (mL per Hour): 40  Tube Feed Duration (in Hours): 24  Tube Feed Start Time: 13:00  Free Water Flush  Pump   Rate (mL per Hour): 30 (22)    Weights:   Weight in k.9 (2023 06:00)  Weight in k.3 (2023 03:57)  Weight in k.6 (2023 04:00)  Weight in k.1 (2023 05:00)  Weight in k.9 (30 Dec 2022 04:06)  Weight in k.6 (29 Dec 2022 04:00)   - Will continue to monitor weights and trend as available.     Nutritionally Pertinent MEDICATIONS  (STANDING):  aMIOdarone    Tablet  dextrose 5%.  dextrose 50% Injectable  dextrose 50% Injectable  dextrose 50% Injectable  DOBUTamine Infusion  glucagon  Injectable  insulin lispro (ADMELOG) corrective regimen sliding scale  lactulose Syrup  meropenem  IVPB  pantoprazole  Injectable    Pertinent Labs:  @ 06:44: Na 133<L>, <H>, Cr 6.35<H>, <H>, K+ 3.9, Phos --, Mg --, Alk Phos 197<H>, ALT/SGPT 121<H>, AST/SGOT 44<H>, HbA1c --    A1C with Estimated Average Glucose Result: 6.4 % (22 @ 07:03)    Finger Sticks:  POCT Blood Glucose.: 96 mg/dL ( @ 11:19)  POCT Blood Glucose.: 109 mg/dL ( @ 06:58)  POCT Blood Glucose.: 132 mg/dL ( @ 00:24)  POCT Blood Glucose.: 100 mg/dL ( @ 18:45)      Skin per nursing documentation: Sacrum Stage 2   Edema: +1 generalized edema on () per nursing flow sheets     Estimated Needs:   [x] no change since previous assessment  [] recalculated:     Previous Nutrition Diagnosis: Increased nutrients needs   Nutrition Diagnosis is: [x] ongoing  [] resolved [] not applicable     New Nutrition Diagnosis: [x] Not applicable    Nutrition Care Plan:  [x] In Progress  [] Achieved  [] Not applicable    Nutrition Interventions:     Education Provided:       [] Yes:  [x] No:        Recommendations:         [x] Continue current tube feed regimen      [x] Monitor GI tolerance to feeds, RD to remain available to adjust EN formulary, volume/rate as needed/upon request.      [x] RD to remain available             Monitoring and Evaluation:   Continue to monitor nutritional intake, tolerance to diet prescription, weights, labs, skin integrity    RD remains available upon request and will follow up per protocol
Run of VT over night   22 beats total  Dose of Dobutamine dropped to 2.5   Continue to monitor

## 2023-01-12 NOTE — PROGRESS NOTE ADULT - SUBJECTIVE AND OBJECTIVE BOX
Patient is a 62y old  Male who presents with a chief complaint of resp failure (12 Jan 2023 11:54)      INTERVAL HPI/OVERNIGHT EVENTS:  events noted.     MEDICATIONS  (STANDING):  albuterol    90 MICROgram(s) HFA Inhaler 2 Puff(s) Inhalation every 6 hours  albuterol/ipratropium for Nebulization. 3 milliLiter(s) Nebulizer once  aMIOdarone    Tablet 200 milliGRAM(s) Oral daily  aspirin  chewable 81 milliGRAM(s) Oral daily  chlorhexidine 0.12% Liquid 15 milliLiter(s) Oral Mucosa every 12 hours  dextrose 5%. 1000 milliLiter(s) (100 mL/Hr) IV Continuous <Continuous>  dextrose 50% Injectable 25 Gram(s) IV Push once  dextrose 50% Injectable 12.5 Gram(s) IV Push once  dextrose 50% Injectable 25 Gram(s) IV Push once  DOBUTamine Infusion 5 MICROgram(s)/kG/Min (12.2 mL/Hr) IV Continuous <Continuous>  glucagon  Injectable 1 milliGRAM(s) IntraMuscular once  heparin   Injectable 5000 Unit(s) SubCutaneous every 8 hours  insulin lispro (ADMELOG) corrective regimen sliding scale   SubCutaneous every 6 hours  ipratropium 17 MICROgram(s) HFA Inhaler 1 Puff(s) Inhalation every 6 hours  nystatin Powder 1 Application(s) Topical two times a day  pantoprazole  Injectable 40 milliGRAM(s) IV Push daily  propofol Infusion 10 MICROgram(s)/kG/Min (4.9 mL/Hr) IV Continuous <Continuous>    MEDICATIONS  (PRN):  dextrose Oral Gel 15 Gram(s) Oral once PRN Blood Glucose LESS THAN 70 milliGRAM(s)/deciliter      Allergies  No Known Allergies    Intolerances  pork (Other)      REVIEW OF SYSTEMS:  unable to obtain    Vital Signs Last 24 Hrs  T(C): 36.8 (12 Jan 2023 12:40), Max: 37.2 (11 Jan 2023 22:00)  T(F): 98.2 (12 Jan 2023 12:40), Max: 99 (11 Jan 2023 22:00)  HR: 85 (12 Jan 2023 13:00) (75 - 99)  BP: 127/85 (12 Jan 2023 13:00) (90/67 - 127/85)  BP(mean): 99 (12 Jan 2023 13:00) (75 - 100)  RR: 21 (12 Jan 2023 13:00) (16 - 25)  SpO2: 100% (12 Jan 2023 13:00) (94% - 100%)    Parameters below as of 12 Jan 2023 12:52  Patient On (Oxygen Delivery Method): ventilator,35        PHYSICAL EXAM:  GENERAL: NAD, well-groomed, well-developed  HEAD:  Atraumatic, Normocephalic  EYES: conjunctiva and sclera clear  ENMT: Moist mucous membranes  NECK: Supple,  NERVOUS SYSTEM:  opens eyes, not following commands.   CHEST/LUNG: Clear to auscultation bilaterally;  HEART: Regular rate and rhythm;   ABDOMEN: Soft, Nontender, Nondistended; Bowel sounds present  EXTREMITIES:  2+ Peripheral Pulses,   lesions    LABS:                        10.2   11.80 )-----------( 211      ( 12 Jan 2023 06:02 )             32.3     12 Jan 2023 06:02    141    |  104    |  127    ----------------------------<  125    4.5     |  22     |  4.35     Ca    8.9        12 Jan 2023 06:02  Phos  7.4       12 Jan 2023 06:02  Mg     2.1       12 Jan 2023 06:02    TPro  8.3    /  Alb  2.1    /  TBili  1.0    /  DBili  x      /  AST  51     /  ALT  48     /  AlkPhos  301    12 Jan 2023 06:02        CAPILLARY BLOOD GLUCOSE  POCT Blood Glucose.: 105 mg/dL (12 Jan 2023 11:54)  POCT Blood Glucose.: 119 mg/dL (12 Jan 2023 05:04)  POCT Blood Glucose.: 150 mg/dL (11 Jan 2023 23:04)  POCT Blood Glucose.: 117 mg/dL (11 Jan 2023 17:17)      RADIOLOGY & ADDITIONAL TESTS:    Imaging Personally Reviewed:  [ ] YES     Consultant(s) Notes Reviewed:      Care Discussed with Consultants/Other Providers:    Advanced Directives: [ ] DNR  [ ] No feeding tube  [ ] MOLST in chart  [ ] MOLST completed today  [ ] Unknown

## 2023-01-12 NOTE — PROGRESS NOTE ADULT - ASSESSMENT
62M with HTN, dilated CM s/p AICD, CKD, opioid / substance abuse, AFib/Flutter admitted with acute hypoxic respiratory failure requiring intubation. Course complicated by L PTX s/p intubation and PEA arrest with chest tube emergently placed by ED and subsequent ROSC. Admitted to [facility] with course further complicated with Aspiration PNA, shock, and DERRICK on CKD.    Acute Hypoxic Respiratory Failure s/t L tension PTX with subsequent cardiac arrest  Aspiration PNA  Acute Systolic CHF / Dilated Cardiomyopathy  DERRICK on CKD likely iso ATN    Plan:  NEURO: Sedated with propofol and PRN fentanyl  CARDIAC: Remains in shock on dobutamine 5 mcg/kg/min. Continue Amiodarone 200mg daily  RESPIRATORY: Remains on Full vent support. Titrating settings to maintain SpO2 >92%. Chest tube remains to suction  GI: NPO with tube feeds. PPI for stress ulcer ppx  : DERRICK likely attributable to ongoing shock state. Cr and UOP worsening, Nephrology following, deemed patient not candidate for HD, continue to trend Cr and monitor I&Os. Goal UOP >0.5 cc/kg/hr  ENDO: ISS, q6h FS while on TFs  ID: Afebrile, WBC 11k. S/p full course of zosyn and meropenem. Continue to monitor off abx  HEME: SQH for DVT ppx    DISPO: SPCU

## 2023-01-12 NOTE — PROGRESS NOTE ADULT - ASSESSMENT
61 y/o M CAD, Dilated cardiomyopathy, S/p AICD, Afib/flutter, h/o substance abuse,  presented with acute hypoxic and hypercarbic respiratory failure associated with pneumothorax which led to cardiac arrest       Acute respiratory failure secondary to tension pneumothorax with leading to cardiac arrest   - continue vent management;    - continue chest tubes to suction for now.   - duoneb inhalation     Aspiration pneumonia /Sputum Cx psudomonous    completed zosyn x10 days , now off abx    hypernatremia  - resolved    Cardiomyopathy, Acute systolic CHF   - hold eliquis   - on dobutamine drip per cardiolgy  - amiodarone    DM2  - FS monitoring with lispro coverage scale while critically ill    DERRICK on CKD  - Likely ATN/ischemic   - monitor creatinine: slight improved    - avoid nephrotoxic agents  - per renal : no need for dialysis a this point ,    Prophylactic measure  - DVT proph: heparin SQ has been on hold due to bleeding from ET tube.    - GI proph: protonix      Ethics consult appreciated.  SW aware to evaluate for next steps in either trach/peg/possible HD if renal function worsen  vs   comfort care.      63 y/o M CAD, Dilated cardiomyopathy, S/p AICD, Afib/flutter, h/o substance abuse,  presented with acute hypoxic and hypercarbic respiratory failure associated with pneumothorax which led to cardiac arrest       Acute respiratory failure secondary to tension pneumothorax with leading to cardiac arrest   - continue vent management;    - continue chest tubes to suction for now.   - duoneb inhalation     Aspiration pneumonia /Sputum Cx psudomonous    completed zosyn x10 days , now off abx    hypernatremia  - resolved    Cardiomyopathy, Acute on chronic systolic CHF   - hold eliquis   - on dobutamine drip per cardiolgy  - amiodarone    DM2  - FS monitoring with lispro coverage scale while critically ill    DERRICK on CKD  - Likely ATN/ischemic   - monitor creatinine: improved but now increasing again.   - avoid nephrotoxic agents  - per renal : dialysis would likely cause harm, not provide for any meaningful recovery.     Prophylactic measure  - DVT proph: heparin SQ   - GI proph: protonix      Ethics consult appreciated.  Patient is in multiorgan system failure with no overall improvement in prognosis.

## 2023-01-12 NOTE — PROGRESS NOTE ADULT - SUBJECTIVE AND OBJECTIVE BOX
Chief Complaint: Respiratory failure    Interval Events: No events overnight.    Review of Systems:  Unable to obtain    Physical Exam:  Vital Signs Last 24 Hrs  T(C): 36.6 (12 Jan 2023 08:45), Max: 37.2 (11 Jan 2023 22:00)  T(F): 97.8 (12 Jan 2023 08:45), Max: 99 (11 Jan 2023 22:00)  HR: 80 (12 Jan 2023 08:00) (80 - 99)  BP: 116/78 (12 Jan 2023 08:00) (90/67 - 125/89)  BP(mean): 90 (12 Jan 2023 08:00) (75 - 100)  RR: 20 (12 Jan 2023 08:00) (16 - 25)  SpO2: 100% (12 Jan 2023 08:00) (94% - 100%)  Parameters below as of 12 Jan 2023 08:00  Patient On (Oxygen Delivery Method): ventilator  O2 Concentration (%): 35  General: Sedated  HEENT: Intubated  Neck: No JVD, no carotid bruit  Lungs: CTAB  CV: RRR, nl S1/S2, no M/R/G  Abdomen: S/NT/ND, +BS  Extremities: No LE edema, no cyanosis  Neuro: AAOx0  Skin: No rash    Labs:    01-12    141  |  104  |  127<H>  ----------------------------<  125<H>  4.5   |  22  |  4.35<H>    Ca    8.9      12 Jan 2023 06:02  Phos  7.4     01-12  Mg     2.1     01-12    TPro  8.3  /  Alb  2.1<L>  /  TBili  1.0  /  DBili  x   /  AST  51<H>  /  ALT  48  /  AlkPhos  301<H>  01-12                        10.2   11.80 )-----------( 211      ( 12 Jan 2023 06:02 )             32.3       ECG/Telemetry: Sinus rhythm

## 2023-01-12 NOTE — PROGRESS NOTE ADULT - ASSESSMENT
REVIEW OF SYMPTOMS      Able to give (reliable) ROS  NO     PHYSICAL EXAM    HEENT Unremarkable  atraumatic   RESP Fair air entry EXP prolonged    Harsh breath sound Resp distres mild   CARDIAC S1 S2 No S3     NO JVD    ABDOMEN SOFT BS PRESENT NOT DISTENDED No hepatosplenomegaly   PEDAL EDEMA present No calf tenderness  NO rash       GENERAL DATA .   GOC.   12/23/2022 full code  ALLGY.     nka                  WT.     12/24/2022 81           BMI.       12/24/2022 26          ICU STAY. .. 12/23/2022  COVID. ..  12/23/2022 scv2 (-)   BEST PRACTICE ISSUES.    HOB ELEVATN. Yes  DVT PPLX. ..    12/23/2022 hpsc   WHITMORE PPLX. ..    12/23/2022 protonix 40   INFN PPLX. ..  12/23/2022 chlorhexidine .12%   SP SW VIVIAN.         DIET.  .. 12/26 nepro 960 ng    IV fl...  FREE WATER 1/9/2023 free water 250.3   PROCEDURES...   12/23/2022  l chest tube   12/23/2022 intubated   12/23/2022 reed       ABGS.  1/6/2023 vent 30% 739/28/65     VS/ PO/IO/ VENT/ DRIPS.  1/12/2023 afeb 80 116/70   1/12/2023 8a dobu 5 m/k/m   1/12/2023 8a prop 10 m/k/m   1/12/2023 ac 20/400/5/.35     AGE doa cc.  62 m doa 12/23/2022 resp distress    PREV ADMISSION 2/11-2/25/2022 [facility]    PMH  PMH COPD  PMH COVID (+) 2/11/2022   PMH S aureus bacteremia mssa 2/11   .. Ancef 2/12/2022 Dr Phan  PM AICD  PMH HFREF  .. ECHO 11/20/2021 ef 20%  PMH A fib (on eliquis)     PROBLEMS ASSESSMENT RECOMMENDATIONS.  VENT   .. bundle dsv dsbt ltvv pplat 30 (-) PO2 60 (+) ph 7.3 (+)  .. lung protective ventilatn   .. wean as told if fails then trach  PROLONGED INTUBATN.  .. Plan trach vs GOC determination   WEANING  .. If tolerates cpap will plan extrubation 1/9a   SEDATION.  .. dexm 12/30-1/6  .. prop 1/6 -->   .. target rass 0 to (-) 1   HEMOPTYSIS   .. 1/3 given ddavp 25  .. 5 d meropenem started 1/3   .. 1/5/2023 no further hemoptysis   INFECTION.  .. w 1/6-1/9-1/12/2023 w 19  - 16- 11.8  .. 1/6/2023 Has leukocytosis   MICRO  .. mrsa 12/23 (+)   .. 12/24 et pseudo  .. 12/23  klebsiella   ANTIBIO.  .. 12/23-1/1 zosyn completed  .. 1/3 meroipenem restarted by ID as leukocytosis   .. 1/3 meroipenem started as hemoptysis leukocytosi   DERRICK   .. Cr 1/2-1/9-1/12/2023 Cr 5.1 - 3.4- 4.3  .. HD if decided by renal   .. Creat improving   CHEST TUBE   .. 2nd chest tube placed on 1/3 because of worsening pntx   .. to be removed after trach or extubation which ever comes first   CHF.  .. ECHO 11/20/2021 ef 20%  .. On dobu started 1/3-->   NEURO  .. 1/10/2023 Not much progress Opens eyes some movement in lue   OVERALL.  .. GOC determination         TIME SPENT   Over 39 minutes aggregate critical care time spent on encounter; activities included   direct patient care, counseling and/or coordinating care reviewing notes, lab data/ imaging , discussion with multidisciplinary team/ patient  /family and explaining in detail risks, benefits, alternatives  of the recommendations     BIPIN DE LA CRUZ 61 m 12/23/2022 1962 DR KELVIN VANESSA

## 2023-01-12 NOTE — PROGRESS NOTE ADULT - SUBJECTIVE AND OBJECTIVE BOX
Patient is a 62y old  Male who presents with a chief complaint of resp failure (26 Dec 2022 09:47)    HPI: 62M CAD, Dilated cardiomyopathy, S/p AICD, Afib/flutter, h/o substance abuse, CKD baseline creat approx 1.4 last admitted to [facility] with MSSA bacteremia, and CHF.  Sent from [facility] with acute hypoxic respiratory failure.  Was initially on BiPAP then became hypoxic.  Anesthesia intubated patient.  On post intubation Xray pneumothorax evident.  Patient with PEA arrest.  Chest tube placed by ED physician.  ROSC obtained.     Patient unable to give further history.      Intetrval history -     Intubated on vent.   Responsive to stimuli  Doesn't make eye contact.    MEDICATIONS  (STANDING):    albuterol    90 MICROgram(s) HFA Inhaler 2 Puff(s) Inhalation every 6 hours  albuterol/ipratropium for Nebulization. 3 milliLiter(s) Nebulizer once  aMIOdarone    Tablet 200 milliGRAM(s) Oral daily  aspirin  chewable 81 milliGRAM(s) Oral daily  chlorhexidine 0.12% Liquid 15 milliLiter(s) Oral Mucosa every 12 hours  dextrose 5%. 1000 milliLiter(s) (100 mL/Hr) IV Continuous <Continuous>  dextrose 50% Injectable 25 Gram(s) IV Push once  dextrose 50% Injectable 12.5 Gram(s) IV Push once  dextrose 50% Injectable 25 Gram(s) IV Push once  DOBUTamine Infusion 5 MICROgram(s)/kG/Min (12.2 mL/Hr) IV Continuous <Continuous>  glucagon  Injectable 1 milliGRAM(s) IntraMuscular once  heparin   Injectable 5000 Unit(s) SubCutaneous every 8 hours  insulin lispro (ADMELOG) corrective regimen sliding scale   SubCutaneous every 6 hours  ipratropium 17 MICROgram(s) HFA Inhaler 1 Puff(s) Inhalation every 6 hours  nystatin Powder 1 Application(s) Topical two times a day  pantoprazole  Injectable 40 milliGRAM(s) IV Push daily  propofol Infusion 10 MICROgram(s)/kG/Min (4.9 mL/Hr) IV Continuous <Continuous>    MEDICATIONS  (PRN):    dextrose Oral Gel 15 Gram(s) Oral once PRN Blood Glucose LESS THAN 70 milliGRAM(s)/deciliter    Allergies    No Known Allergies    Intolerances    pork (Other)    REVIEW OF SYSTEMS: UTO    PHYSICAL EXAM:    Vital Signs Last 24 Hrs    T(C): 36.6 (12 Jan 2023 08:45), Max: 37.2 (11 Jan 2023 22:00)  T(F): 97.8 (12 Jan 2023 08:45), Max: 99 (11 Jan 2023 22:00)  HR: 75 (12 Jan 2023 10:00) (75 - 99)  BP: 111/75 (12 Jan 2023 10:00) (90/67 - 125/89)  BP(mean): 87 (12 Jan 2023 10:00) (75 - 100)  RR: 20 (12 Jan 2023 10:00) (16 - 25)  SpO2: 100% (12 Jan 2023 10:00) (94% - 100%)    Parameters below as of 12 Jan 2023 08:00  Patient On (Oxygen Delivery Method): ventilator    O2 Concentration (%): 35    On Neurological Examination:    Intubated on vent.  On sedation.  Opens eyes on stimulation. Doesn't make eye contact.   Pupils are 3 mm, sluggishly reacting to light.  Neck is supple.  No abn body movements.    LABS:    CBC Full  -  ( 12 Jan 2023 06:02 )  WBC Count : 11.80 K/uL  RBC Count : 3.38 M/uL  Hemoglobin : 10.2 g/dL  Hematocrit : 32.3 %  Platelet Count - Automated : 211 K/uL  Mean Cell Volume : 95.6 fl  Mean Cell Hemoglobin : 30.2 pg  Mean Cell Hemoglobin Concentration : 31.6 gm/dL    01-12    141  |  104  |  127<H>  ----------------------------<  125<H>  4.5   |  22  |  4.35<H>    Ca    8.9      12 Jan 2023 06:02  Phos  7.4     01-12  Mg     2.1     01-12    TPro  8.3  /  Alb  2.1<L>  /  TBili  1.0  /  DBili  x   /  AST  51<H>  /  ALT  48  /  AlkPhos  301<H>  01-12    RADIOLOGY & ADDITIONAL STUDIES:    < from: TTE Echo Complete w/o Contrast w/ Doppler (02.13.22 @ 09:00) >    1. Severe left ventricular systolic dysfunction. EF 10 %  2. Dilated left ventricle, left atrium, right atrium.  3. Moderate mitral regurgitation.  4. Moderate tricuspid regurgitation.  5. Mild pulmonary hypertension.  6. No evidence of endocarditis on this transthoracic study.    < end of copied text >    r< from: CT Head No Cont (12.29.22 @ 13:20) >    IMPRESSION: No acute intracranial hemorrhage, mass effect, or shift of the midline structures.    Similar-appearing mild chronic white matter microvascular type changes.    < end of copied text >    < from: CT Head No Cont (12.23.22 @ 21:18) >    IMPRESSION:    No large territory acute infarct, intracranial hemorrhage, or mass effect.    Slight progression of chronic microangiopathic white matter changes as compared to prior study from 2016.    < end of copied text >    < from: CT Chest No Cont (12.23.22 @ 21:19) >    IMPRESSION:    *  Left chest tube with residual small left pneumothorax. No evidence of tension.  *  Right basilar rounded atelectasis again noted with chronic small loculated right pleural effusion.  *  Small pneumomediastinum. Left chest and abdominal wall emphysema. Moderate pneumoretroperitoneum and properitoneal air. No pneumoperitoneum. Findings are compatible with barotrauma.  *  Evidence of urinary bladder cystitis.    < end of copied text >

## 2023-01-12 NOTE — PROGRESS NOTE ADULT - SUBJECTIVE AND OBJECTIVE BOX
DOR[facility]HY VOSS    [facility]    Allergies    No Known Allergies    Intolerances    pork (Other)      PAST MEDICAL & SURGICAL HISTORY:  Heart failure, systolic      CAD (coronary artery disease)      Hypertension      Nonischemic cardiomyopathy      COPD, moderate      2019 novel coronavirus disease (COVID-19)      Substance abuse      HLD (hyperlipidemia)      Cardiac LV ejection fraction 10-20%      AICD (automatic cardioverter/defibrillator) present      Cardiac LV ejection fraction 10-20%          FAMILY HISTORY:  FH: lung cancer        Home Medications:  acetaminophen 325 mg oral tablet: 2 tab(s) orally every 6 hours, As needed, Temp greater or equal to 38C (100.4F), Mild Pain (1 - 3) (23 Dec 2022 10:07)  apixaban 5 mg oral tablet: 1 tab(s) orally every 12 hours (23 Dec 2022 10:07)  Aquaphor Healing topical ointment: Apply topically to affected area once a day (23 Dec 2022 10:07)  ascorbic acid 500 mg oral tablet: 1 tab(s) orally once a day (23 Dec 2022 10:07)  Bacid (LAC) oral capsule: 2 cap(s) orally once a day (23 Dec 2022 10:07)  bumetanide 2 mg oral tablet: 1 tab(s) orally 2 times a day (23 Dec 2022 10:07)  gabapentin 100 mg oral capsule: 1 cap(s) orally 3 times a day (23 Dec 2022 10:07)  melatonin 3 mg oral tablet: 1 tab(s) orally once a day (at bedtime), As needed, Insomnia (23 Dec 2022 10:07)  Multiple Vitamins with Minerals oral tablet: 1 tab(s) orally once a day (23 Dec 2022 10:07)  pantoprazole 40 mg oral delayed release tablet: 1 tab(s) orally once a day (before a meal) (23 Dec 2022 10:07)  sacubitril-valsartan 24 mg-26 mg oral tablet: 1 tab(s) orally 2 times a day (23 Dec 2022 10:07)  simethicone 80 mg oral tablet: 2 tab(s) orally every 6 hours, As Needed (23 Dec 2022 10:07)  spironolactone 25 mg oral tablet: 1 tab(s) orally once a day (23 Dec 2022 10:07)  Symbicort 160 mcg-4.5 mcg/inh inhalation aerosol: 2 puff(s) inhaled 2 times a day (23 Dec 2022 10:07)  Ventolin HFA 90 mcg/inh inhalation aerosol: 2 puff(s) inhaled every 6 hours, As Needed (23 Dec 2022 10:07)      MEDICATIONS  (STANDING):  albuterol    90 MICROgram(s) HFA Inhaler 2 Puff(s) Inhalation every 6 hours  albuterol/ipratropium for Nebulization. 3 milliLiter(s) Nebulizer once  aMIOdarone    Tablet 200 milliGRAM(s) Oral daily  aspirin  chewable 81 milliGRAM(s) Oral daily  chlorhexidine 0.12% Liquid 15 milliLiter(s) Oral Mucosa every 12 hours  dextrose 5%. 1000 milliLiter(s) (100 mL/Hr) IV Continuous <Continuous>  dextrose 50% Injectable 25 Gram(s) IV Push once  dextrose 50% Injectable 12.5 Gram(s) IV Push once  dextrose 50% Injectable 25 Gram(s) IV Push once  DOBUTamine Infusion 5 MICROgram(s)/kG/Min (12.2 mL/Hr) IV Continuous <Continuous>  glucagon  Injectable 1 milliGRAM(s) IntraMuscular once  heparin   Injectable 5000 Unit(s) SubCutaneous every 8 hours  insulin lispro (ADMELOG) corrective regimen sliding scale   SubCutaneous every 6 hours  ipratropium 17 MICROgram(s) HFA Inhaler 1 Puff(s) Inhalation every 6 hours  nystatin Powder 1 Application(s) Topical two times a day  pantoprazole  Injectable 40 milliGRAM(s) IV Push daily  propofol Infusion 10 MICROgram(s)/kG/Min (4.9 mL/Hr) IV Continuous <Continuous>    MEDICATIONS  (PRN):  dextrose Oral Gel 15 Gram(s) Oral once PRN Blood Glucose LESS THAN 70 milliGRAM(s)/deciliter      Diet, NPO with Tube Feed:   Tube Feeding Modality: Nasogastric  Nepro with Carb Steady  Total Volume for 24 Hours (mL): 960  Continuous  Starting Tube Feed Rate mL per Hour: 20  Increase Tube Feed Rate by (mL): 10     Every 4 hours  Until Goal Tube Feed Rate (mL per Hour): 40  Tube Feed Duration (in Hours): 24  Tube Feed Start Time: 13:00  Free Water Flush  Pump   Rate (mL per Hour): 30 (12-26-22 @ 23:12) [Active]          Vital Signs Last 24 Hrs  T(C): 36.6 (12 Jan 2023 04:00), Max: 37.2 (11 Jan 2023 22:00)  T(F): 97.9 (12 Jan 2023 04:00), Max: 99 (11 Jan 2023 22:00)  HR: 80 (12 Jan 2023 08:00) (80 - 99)  BP: 116/78 (12 Jan 2023 08:00) (90/67 - 125/89)  BP(mean): 90 (12 Jan 2023 08:00) (75 - 100)  RR: 20 (12 Jan 2023 08:00) (16 - 25)  SpO2: 100% (12 Jan 2023 08:00) (94% - 100%)    Parameters below as of 12 Jan 2023 08:00  Patient On (Oxygen Delivery Method): ventilator    O2 Concentration (%): 35      01-11-23 @ 07:01  -  01-12-23 @ 07:00  --------------------------------------------------------  IN: 1466.2 mL / OUT: 525 mL / NET: 941.2 mL    01-12-23 @ 07:01  -  01-12-23 @ 08:21  --------------------------------------------------------  IN: 114.2 mL / OUT: 0 mL / NET: 114.2 mL        Mode: AC/ CMV (Assist Control/ Continuous Mandatory Ventilation), RR (machine): 20, TV (machine): 400, FiO2: 35, PEEP: 5, ITime: 1, MAP: 14, PIP: 29      LABS:                        10.2   11.80 )-----------( 211      ( 12 Jan 2023 06:02 )             32.3     01-12    141  |  104  |  127<H>  ----------------------------<  125<H>  4.5   |  22  |  4.35<H>    Ca    8.9      12 Jan 2023 06:02  Phos  7.4     01-12  Mg     2.1     01-12    TPro  8.3  /  Alb  2.1<L>  /  TBili  1.0  /  DBili  x   /  AST  51<H>  /  ALT  48  /  AlkPhos  301<H>  01-12              WBC:  WBC Count: 11.80 K/uL (01-12 @ 06:02)  WBC Count: 11.92 K/uL (01-11 @ 06:21)  WBC Count: 16.61 K/uL (01-09 @ 06:46)      MICROBIOLOGY:  RECENT CULTURES:                  Sodium:  Sodium, Serum: 141 mmol/L (01-12 @ 06:02)  Sodium, Serum: 142 mmol/L (01-11 @ 06:21)  Sodium, Serum: 142 mmol/L (01-09 @ 06:46)      4.35 mg/dL 01-12 @ 06:02  3.85 mg/dL 01-11 @ 06:21  3.46 mg/dL 01-09 @ 06:46      Hemoglobin:  Hemoglobin: 10.2 g/dL (01-12 @ 06:02)  Hemoglobin: 11.4 g/dL (01-11 @ 06:21)  Hemoglobin: 11.7 g/dL (01-09 @ 06:46)      Platelets: Platelet Count - Automated: 211 K/uL (01-12 @ 06:02)  Platelet Count - Automated: 252 K/uL (01-11 @ 06:21)  Platelet Count - Automated: 291 K/uL (01-09 @ 06:46)      LIVER FUNCTIONS - ( 12 Jan 2023 06:02 )  Alb: 2.1 g/dL / Pro: 8.3 g/dL / ALK PHOS: 301 U/L / ALT: 48 U/L DA / AST: 51 U/L / GGT: x                 RADIOLOGY & ADDITIONAL STUDIES:      MICROBIOLOGY:  RECENT CULTURES:

## 2023-01-12 NOTE — PROGRESS NOTE ADULT - SUBJECTIVE AND OBJECTIVE BOX
Subjective: no change in status. Renal fx is worse again. UO not recorded.   Remains on Dobutamine.       MEDICATIONS  (STANDING):  albuterol    90 MICROgram(s) HFA Inhaler 2 Puff(s) Inhalation every 6 hours  albuterol/ipratropium for Nebulization. 3 milliLiter(s) Nebulizer once  aMIOdarone    Tablet 200 milliGRAM(s) Oral daily  aspirin  chewable 81 milliGRAM(s) Oral daily  chlorhexidine 0.12% Liquid 15 milliLiter(s) Oral Mucosa every 12 hours  dextrose 5%. 1000 milliLiter(s) (100 mL/Hr) IV Continuous <Continuous>  dextrose 50% Injectable 25 Gram(s) IV Push once  dextrose 50% Injectable 12.5 Gram(s) IV Push once  dextrose 50% Injectable 25 Gram(s) IV Push once  DOBUTamine Infusion 5 MICROgram(s)/kG/Min (12.2 mL/Hr) IV Continuous <Continuous>  glucagon  Injectable 1 milliGRAM(s) IntraMuscular once  heparin   Injectable 5000 Unit(s) SubCutaneous every 8 hours  insulin lispro (ADMELOG) corrective regimen sliding scale   SubCutaneous every 6 hours  ipratropium 17 MICROgram(s) HFA Inhaler 1 Puff(s) Inhalation every 6 hours  nystatin Powder 1 Application(s) Topical two times a day  pantoprazole  Injectable 40 milliGRAM(s) IV Push daily  propofol Infusion 10 MICROgram(s)/kG/Min (4.9 mL/Hr) IV Continuous <Continuous>    MEDICATIONS  (PRN):  dextrose Oral Gel 15 Gram(s) Oral once PRN Blood Glucose LESS THAN 70 milliGRAM(s)/deciliter          T(C): 36.8 (01-12-23 @ 12:40), Max: 37.2 (01-11-23 @ 22:00)  HR: 85 (01-12-23 @ 13:00) (75 - 99)  BP: 127/85 (01-12-23 @ 13:00) (90/67 - 127/85)  RR: 21 (01-12-23 @ 13:00) (16 - 25)  SpO2: 100% (01-12-23 @ 13:00) (94% - 100%)  Wt(kg): --        I&O's Detail    11 Jan 2023 07:01  -  12 Jan 2023 07:00  --------------------------------------------------------  IN:    DOBUTamine: 268.4 mL    Enteral Tube Flush: 250 mL    Nepro with Carb Steady: 840 mL    Propofol: 107.8 mL  Total IN: 1466.2 mL    OUT:    Chest Tube (mL): 0 mL    Chest Tube (mL): 0 mL    Indwelling Catheter - Urethral (mL): 250 mL    Rectal Tube (mL): 275 mL  Total OUT: 525 mL    Total NET: 941.2 mL      12 Jan 2023 07:01  -  12 Jan 2023 14:23  --------------------------------------------------------  IN:    DOBUTamine: 85.4 mL    Nepro with Carb Steady: 280 mL    Propofol: 24.5 mL  Total IN: 389.9 mL    OUT:  Total OUT: 0 mL    Total NET: 389.9 mL          Mode: AC/ CMV (Assist Control/ Continuous Mandatory Ventilation)  RR (machine): 20  TV (machine): 400  FiO2: 35  PEEP: 5  ITime: 1  MAP: 16  PIP: 32       PHYSICAL EXAM:    GENERAL: minimally responsive  CHEST/LUNG: dec BS  HEART: S1S2  ABDOMEN: distended.   EXTREMITIES:  pos hip edema.         LABS:  CBC Full  -  ( 12 Jan 2023 06:02 )  WBC Count : 11.80 K/uL  RBC Count : 3.38 M/uL  Hemoglobin : 10.2 g/dL  Hematocrit : 32.3 %  Platelet Count - Automated : 211 K/uL  Mean Cell Volume : 95.6 fl  Mean Cell Hemoglobin : 30.2 pg  Mean Cell Hemoglobin Concentration : 31.6 gm/dL  Auto Neutrophil # : x  Auto Lymphocyte # : x  Auto Monocyte # : x  Auto Eosinophil # : x  Auto Basophil # : x  Auto Neutrophil % : x  Auto Lymphocyte % : x  Auto Monocyte % : x  Auto Eosinophil % : x  Auto Basophil % : x    01-12    141  |  104  |  127<H>  ----------------------------<  125<H>  4.5   |  22  |  4.35<H>    Ca    8.9      12 Jan 2023 06:02  Phos  7.4     01-12  Mg     2.1     01-12    TPro  8.3  /  Alb  2.1<L>  /  TBili  1.0  /  DBili  x   /  AST  51<H>  /  ALT  48  /  AlkPhos  301<H>  01-12        Impression:  1.  DERRICK on CKD-3 (Baseline creatinine 1.3-1.5): Ischemic ATN. Waxing, waning.   2.  Acute respiratory failure and PEA arrest  3.  S/p large left sided tension pneumothorax  4.  Chronic systolic heart failure    Recommend:  Continue supportive care  Not a dialytic candidate due to severe CM, numerous comorbidities. Short or long term HD will likely be futile and fraught with hemodynamic instability.

## 2023-01-12 NOTE — PROGRESS NOTE ADULT - SUBJECTIVE AND OBJECTIVE BOX
Date/Time Patient Seen:  		  Referring MD:   Data Reviewed	       Patient is a 62y old  Male who presents with a chief complaint of resp failure (11 Jan 2023 19:10)      Subjective/HPI     PAST MEDICAL & SURGICAL HISTORY:  Heart failure, systolic    CAD (coronary artery disease)    Hypertension    Nonischemic cardiomyopathy    COPD, moderate    2019 novel coronavirus disease (COVID-19)    Substance abuse    HLD (hyperlipidemia)    Cardiac LV ejection fraction 10-20%    No significant past surgical history    AICD (automatic cardioverter/defibrillator) present    Cardiac LV ejection fraction 10-20%          Medication list         MEDICATIONS  (STANDING):  albuterol    90 MICROgram(s) HFA Inhaler 2 Puff(s) Inhalation every 6 hours  albuterol/ipratropium for Nebulization. 3 milliLiter(s) Nebulizer once  aMIOdarone    Tablet 200 milliGRAM(s) Oral daily  aspirin  chewable 81 milliGRAM(s) Oral daily  chlorhexidine 0.12% Liquid 15 milliLiter(s) Oral Mucosa every 12 hours  dextrose 5%. 1000 milliLiter(s) (100 mL/Hr) IV Continuous <Continuous>  dextrose 50% Injectable 25 Gram(s) IV Push once  dextrose 50% Injectable 12.5 Gram(s) IV Push once  dextrose 50% Injectable 25 Gram(s) IV Push once  DOBUTamine Infusion 5 MICROgram(s)/kG/Min (12.2 mL/Hr) IV Continuous <Continuous>  glucagon  Injectable 1 milliGRAM(s) IntraMuscular once  heparin   Injectable 5000 Unit(s) SubCutaneous every 8 hours  insulin lispro (ADMELOG) corrective regimen sliding scale   SubCutaneous every 6 hours  ipratropium 17 MICROgram(s) HFA Inhaler 1 Puff(s) Inhalation every 6 hours  nystatin Powder 1 Application(s) Topical two times a day  pantoprazole  Injectable 40 milliGRAM(s) IV Push daily  propofol Infusion 10 MICROgram(s)/kG/Min (4.9 mL/Hr) IV Continuous <Continuous>    MEDICATIONS  (PRN):  dextrose Oral Gel 15 Gram(s) Oral once PRN Blood Glucose LESS THAN 70 milliGRAM(s)/deciliter         Vitals log        ICU Vital Signs Last 24 Hrs  T(C): 36.6 (12 Jan 2023 04:00), Max: 37.2 (11 Jan 2023 22:00)  T(F): 97.9 (12 Jan 2023 04:00), Max: 99 (11 Jan 2023 22:00)  HR: 90 (12 Jan 2023 06:00) (82 - 99)  BP: 124/90 (12 Jan 2023 06:00) (90/67 - 125/89)  BP(mean): 100 (12 Jan 2023 06:00) (75 - 100)  ABP: --  ABP(mean): --  RR: 19 (12 Jan 2023 06:00) (16 - 25)  SpO2: 100% (12 Jan 2023 06:00) (94% - 100%)    O2 Parameters below as of 12 Jan 2023 00:44  Patient On (Oxygen Delivery Method): ventilator             Mode: AC/ CMV (Assist Control/ Continuous Mandatory Ventilation)  RR (machine): 20  TV (machine): 400  FiO2: 35  PEEP: 5  ITime: 1  MAP: 12  PIP: 28      Input and Output:  I&O's Detail    10 David 2023 07:01  -  11 Jan 2023 07:00  --------------------------------------------------------  IN:    DOBUTamine: 280.6 mL    Enteral Tube Flush: 1000 mL    Nepro with Carb Steady: 920 mL    Propofol: 112.7 mL  Total IN: 2313.3 mL    OUT:    Chest Tube (mL): 0 mL    Chest Tube (mL): 0 mL    Indwelling Catheter - Urethral (mL): 700 mL    Rectal Tube (mL): 75 mL  Total OUT: 775 mL    Total NET: 1538.3 mL      11 Jan 2023 07:01  -  12 Jan 2023 06:43  --------------------------------------------------------  IN:    DOBUTamine: 268.4 mL    Enteral Tube Flush: 250 mL    Nepro with Carb Steady: 840 mL    Propofol: 107.8 mL  Total IN: 1466.2 mL    OUT:    Chest Tube (mL): 0 mL    Chest Tube (mL): 0 mL    Indwelling Catheter - Urethral (mL): 250 mL    Rectal Tube (mL): 275 mL  Total OUT: 525 mL    Total NET: 941.2 mL          Lab Data                        10.2   11.80 )-----------( 211      ( 12 Jan 2023 06:02 )             32.3     01-12    141  |  104  |  127<H>  ----------------------------<  125<H>  4.5   |  22  |  4.35<H>    Ca    8.9      12 Jan 2023 06:02  Phos  7.4     01-12  Mg     2.1     01-12    TPro  8.3  /  Alb  2.1<L>  /  TBili  1.0  /  DBili  x   /  AST  51<H>  /  ALT  48  /  AlkPhos  301<H>  01-12            Review of Systems	      Objective     Physical Examination  heart s1s2  lung dec BS  head nc        Pertinent Lab findings & Imaging      Leo:  NO   Adequate UO     I&O's Detail    10 David 2023 07:01  -  11 Jan 2023 07:00  --------------------------------------------------------  IN:    DOBUTamine: 280.6 mL    Enteral Tube Flush: 1000 mL    Nepro with Carb Steady: 920 mL    Propofol: 112.7 mL  Total IN: 2313.3 mL    OUT:    Chest Tube (mL): 0 mL    Chest Tube (mL): 0 mL    Indwelling Catheter - Urethral (mL): 700 mL    Rectal Tube (mL): 75 mL  Total OUT: 775 mL    Total NET: 1538.3 mL      11 Jan 2023 07:01  -  12 Jan 2023 06:43  --------------------------------------------------------  IN:    DOBUTamine: 268.4 mL    Enteral Tube Flush: 250 mL    Nepro with Carb Steady: 840 mL    Propofol: 107.8 mL  Total IN: 1466.2 mL    OUT:    Chest Tube (mL): 0 mL    Chest Tube (mL): 0 mL    Indwelling Catheter - Urethral (mL): 250 mL    Rectal Tube (mL): 275 mL  Total OUT: 525 mL    Total NET: 941.2 mL               Discussed with:     Cultures:	        Radiology

## 2023-01-13 NOTE — PROGRESS NOTE ADULT - SUBJECTIVE AND OBJECTIVE BOX
Patient is a 62y old  Male who presents with a chief complaint of resp failure (13 Jan 2023 11:56)      INTERVAL HPI/OVERNIGHT EVENTS: events noted.     MEDICATIONS  (STANDING):  albuterol    90 MICROgram(s) HFA Inhaler 2 Puff(s) Inhalation every 6 hours  albuterol/ipratropium for Nebulization. 3 milliLiter(s) Nebulizer once  aMIOdarone    Tablet 200 milliGRAM(s) Oral daily  aspirin  chewable 81 milliGRAM(s) Oral daily  chlorhexidine 0.12% Liquid 15 milliLiter(s) Oral Mucosa every 12 hours  dextrose 5%. 1000 milliLiter(s) (100 mL/Hr) IV Continuous <Continuous>  dextrose 50% Injectable 25 Gram(s) IV Push once  dextrose 50% Injectable 12.5 Gram(s) IV Push once  dextrose 50% Injectable 25 Gram(s) IV Push once  DOBUTamine Infusion 5 MICROgram(s)/kG/Min (12.2 mL/Hr) IV Continuous <Continuous>  glucagon  Injectable 1 milliGRAM(s) IntraMuscular once  heparin   Injectable 5000 Unit(s) SubCutaneous every 8 hours  insulin lispro (ADMELOG) corrective regimen sliding scale   SubCutaneous every 6 hours  ipratropium 17 MICROgram(s) HFA Inhaler 1 Puff(s) Inhalation every 6 hours  nystatin Powder 1 Application(s) Topical two times a day  pantoprazole  Injectable 40 milliGRAM(s) IV Push daily  propofol Infusion 10 MICROgram(s)/kG/Min (4.9 mL/Hr) IV Continuous <Continuous>    MEDICATIONS  (PRN):  dextrose Oral Gel 15 Gram(s) Oral once PRN Blood Glucose LESS THAN 70 milliGRAM(s)/deciliter      Allergies  No Known Allergies    Intolerances  pork (Other)      REVIEW OF SYSTEMS:  unable to obtain.     Vital Signs Last 24 Hrs  T(C): 36.8 (13 Jan 2023 12:16), Max: 37.2 (13 Jan 2023 07:51)  T(F): 98.3 (13 Jan 2023 12:16), Max: 99 (13 Jan 2023 07:51)  HR: 84 (13 Jan 2023 12:01) (78 - 96)  BP: 135/85 (13 Jan 2023 12:00) (111/78 - 135/85)  BP(mean): 99 (13 Jan 2023 12:00) (88 - 105)  RR: 21 (13 Jan 2023 12:00) (14 - 25)  SpO2: 98% (13 Jan 2023 12:01) (95% - 100%)    Parameters below as of 13 Jan 2023 12:00  Patient On (Oxygen Delivery Method): ventilator    O2 Concentration (%): 30    PHYSICAL EXAM:  GENERAL: intubated, ngt in place  HEAD:  Atraumatic, Normocephalic  EYES:  conjunctiva and sclera clear  ENMT: Moist mucous membranes  NECK: Supple, No JVD  NERVOUS SYSTEM:  occ opens eyes and moves left hand/foot  CHEST/LUNG: Clear to auscultation bilaterally;   HEART: Regular rate and rhythm;   ABDOMEN: Soft, Nontender, mildly distended; Bowel sounds present  EXTREMITIES:  2+ Peripheral Pulses, diffuse edema      LABS:                        9.7    11.43 )-----------( 219      ( 13 Jan 2023 06:00 )             30.6     13 Jan 2023 06:00    142    |  103    |  141    ----------------------------<  120    4.1     |  22     |  4.85     Ca    9.3        13 Jan 2023 06:00  Phos  7.3       13 Jan 2023 06:00  Mg     2.9       13 Jan 2023 06:00      CAPILLARY BLOOD GLUCOSE  POCT Blood Glucose.: 114 mg/dL (13 Jan 2023 11:41)  POCT Blood Glucose.: 115 mg/dL (13 Jan 2023 05:12)  POCT Blood Glucose.: 122 mg/dL (12 Jan 2023 23:05)  POCT Blood Glucose.: 128 mg/dL (12 Jan 2023 17:15)      RADIOLOGY & ADDITIONAL TESTS:    Imaging Personally Reviewed:  [ ] YES     Consultant(s) Notes Reviewed:      Care Discussed with Consultants/Other Providers:    Advanced Directives: [ ] DNR  [ ] No feeding tube  [ ] MOLST in chart  [ ] MOLST completed today  [ ] Unknown

## 2023-01-13 NOTE — PROGRESS NOTE ADULT - ASSESSMENT
REVIEW OF SYMPTOMS      Able to give (reliable) ROS  NO     PHYSICAL EXAM    HEENT Unremarkable  atraumatic   RESP Fair air entry EXP prolonged    Harsh breath sound Resp distres mild   CARDIAC S1 S2 No S3     NO JVD    ABDOMEN SOFT BS PRESENT NOT DISTENDED No hepatosplenomegaly   PEDAL EDEMA present No calf tenderness  NO rash       GENERAL DATA .   GOC.   12/23/2022 full code  ALLGY.     nka                  WT.     12/24/2022 81           BMI.       12/24/2022 26          ICU STAY. .. 12/23/2022  COVID. ..  12/23/2022 scv2 (-)   BEST PRACTICE ISSUES.    HOB ELEVATN. Yes  DVT PPLX. ..    12/23/2022 hpsc   WHITMORE PPLX. ..    12/23/2022 protonix 40   INFN PPLX. ..  12/23/2022 chlorhexidine .12%   SP SW VIVIAN.         DIET.  .. 12/26 nepro 960 ng    IV fl...  FREE WATER 1/9/2023 free water 250.3   PROCEDURES...   12/23/2022  l chest tube   12/23/2022 intubated     ABGS.  1/6/2023 vent 30% 739/28/65     VS/ PO/IO/ VENT/ DRIPS.  1/13/2023 96 120/80   1/13/2023 11p dobu 5 m/k/m   1/13/2023 11p prop 10 m/k/m  1/13/2023 ac 20/400/5/.3     AGE doa cc.  62 m doa 12/23/2022 resp distress    PREV ADMISSION 2/11-2/25/2022 [facility]    PMH  PMH COPD  PMH COVID (+) 2/11/2022   PMH S aureus bacteremia mssa 2/11   .. Ancef 2/12/2022 Dr Phan  PMH AICD  PMH HFREF  .. ECHO 11/20/2021 ef 20%  PMH A fib (on eliquis)     PROBLEMS ASSESSMENT RECOMMENDATIONS.  VENT   .. bundle dsv dsbt ltvv pplat 30 (-) PO2 60 (+) ph 7.3 (+)  .. lung protective ventilatn   .. wean as told if fails then trach  PROLONGED INTUBATN.  .. Plan trach vs GOC determination   WEANING  .. If tolerates cpap will plan extrubation 1/9a   SEDATION.  .. dexm 12/30-1/6  .. prop 1/6 -->   .. target rass 0 to (-) 1   HEMOPTYSIS   .. 1/3 given ddavp 25  .. 5 d meropenem started 1/3   .. 1/5/2023 no further hemoptysis   INFECTION.  .. w 1/6-1/9-1/12-1/13/2023 w 19  - 16- 11.8- 11.4  .. 1/6/2023 Has leukocytosis   MICRO  .. mrsa 12/23 (+)   .. 12/24 et pseudo  .. 12/23  klebsiella   ANTIBIO.  .. 12/23-1/1 zosyn completed  .. 1/3-1/10 meroipenem  PLAN  .. 1/3 meroipenem started as hemoptysis leukocytosi   .. 1/13/2023 off abio   DERRICK   .. Cr 1/2-1/9-1/12-1/13/2023 Cr 5.1 - 3.4- 4.3- 4.8   .. HD if decided by renal   .. Creat improving   CHEST TUBE   .. 2nd chest tube placed on 1/3 because of worsening pntx   .. to be removed after trach or extubation which ever comes first   CHF.  .. ECHO 11/20/2021 ef 20%  .. On dobu started 1/3-->   ANEMIA.  .. 1/13/2023 Hb 9.7   NEURO  .. 1/10/2023 Not much progress Opens eyes some movement in lue   OVERALL.  .. GOC determination   .. 1/12/2023 4:39 PM Multidisciplinary meeting was held    TIME SPENT   Over 39 minutes aggregate critical care time spent on encounter; activities included   direct patient care, counseling and/or coordinating care reviewing notes, lab data/ imaging , discussion with multidisciplinary team/ patient  /family and explaining in detail risks, benefits, alternatives  of the recommendations     BIPIN DE LA CRUZ 61 m 12/23/2022 1962 DR KELVIN VANESSA

## 2023-01-13 NOTE — PROGRESS NOTE ADULT - SUBJECTIVE AND OBJECTIVE BOX
Date/Time Patient Seen:  		  Referring MD:   Data Reviewed	       Patient is a 62y old  Male who presents with a chief complaint of resp failure (12 Jan 2023 19:16)      Subjective/HPI     PAST MEDICAL & SURGICAL HISTORY:  Heart failure, systolic    CAD (coronary artery disease)    Hypertension    Nonischemic cardiomyopathy    COPD, moderate    2019 novel coronavirus disease (COVID-19)    Substance abuse    HLD (hyperlipidemia)    Cardiac LV ejection fraction 10-20%    No significant past surgical history    AICD (automatic cardioverter/defibrillator) present    Cardiac LV ejection fraction 10-20%          Medication list         MEDICATIONS  (STANDING):  albuterol    90 MICROgram(s) HFA Inhaler 2 Puff(s) Inhalation every 6 hours  albuterol/ipratropium for Nebulization. 3 milliLiter(s) Nebulizer once  aMIOdarone    Tablet 200 milliGRAM(s) Oral daily  aspirin  chewable 81 milliGRAM(s) Oral daily  chlorhexidine 0.12% Liquid 15 milliLiter(s) Oral Mucosa every 12 hours  dextrose 5%. 1000 milliLiter(s) (100 mL/Hr) IV Continuous <Continuous>  dextrose 50% Injectable 25 Gram(s) IV Push once  dextrose 50% Injectable 12.5 Gram(s) IV Push once  dextrose 50% Injectable 25 Gram(s) IV Push once  DOBUTamine Infusion 5 MICROgram(s)/kG/Min (12.2 mL/Hr) IV Continuous <Continuous>  glucagon  Injectable 1 milliGRAM(s) IntraMuscular once  heparin   Injectable 5000 Unit(s) SubCutaneous every 8 hours  insulin lispro (ADMELOG) corrective regimen sliding scale   SubCutaneous every 6 hours  ipratropium 17 MICROgram(s) HFA Inhaler 1 Puff(s) Inhalation every 6 hours  nystatin Powder 1 Application(s) Topical two times a day  pantoprazole  Injectable 40 milliGRAM(s) IV Push daily  propofol Infusion 10 MICROgram(s)/kG/Min (4.9 mL/Hr) IV Continuous <Continuous>    MEDICATIONS  (PRN):  dextrose Oral Gel 15 Gram(s) Oral once PRN Blood Glucose LESS THAN 70 milliGRAM(s)/deciliter         Vitals log        ICU Vital Signs Last 24 Hrs  T(C): 36.4 (13 Jan 2023 04:05), Max: 36.9 (12 Jan 2023 16:00)  T(F): 97.6 (13 Jan 2023 04:05), Max: 98.5 (12 Jan 2023 16:00)  HR: 81 (13 Jan 2023 06:00) (75 - 96)  BP: 120/76 (13 Jan 2023 06:00) (108/76 - 133/93)  BP(mean): 90 (13 Jan 2023 06:00) (87 - 105)  ABP: --  ABP(mean): --  RR: 16 (13 Jan 2023 06:00) (14 - 25)  SpO2: 97% (13 Jan 2023 06:00) (95% - 100%)    O2 Parameters below as of 13 Jan 2023 00:49  Patient On (Oxygen Delivery Method): ventilator,.30             Mode: AC/ CMV (Assist Control/ Continuous Mandatory Ventilation)  RR (machine): 20  TV (machine): 400  FiO2: 30  PEEP: 5  ITime: 1  MAP: 13  PIP: 30      Input and Output:  I&O's Detail    11 Jan 2023 07:01  -  12 Jan 2023 07:00  --------------------------------------------------------  IN:    DOBUTamine: 268.4 mL    Enteral Tube Flush: 250 mL    Nepro with Carb Steady: 840 mL    Propofol: 107.8 mL  Total IN: 1466.2 mL    OUT:    Chest Tube (mL): 0 mL    Chest Tube (mL): 0 mL    Indwelling Catheter - Urethral (mL): 375 mL    Rectal Tube (mL): 275 mL  Total OUT: 650 mL    Total NET: 816.2 mL      12 Jan 2023 07:01  -  13 Jan 2023 06:25  --------------------------------------------------------  IN:    DOBUTamine: 244 mL    Enteral Tube Flush: 750 mL    Nepro with Carb Steady: 800 mL    Propofol: 93.2 mL  Total IN: 1887.2 mL    OUT:    Chest Tube (mL): 0 mL    Chest Tube (mL): 0 mL    Indwelling Catheter - Urethral (mL): 400 mL    Rectal Tube (mL): 350 mL  Total OUT: 750 mL    Total NET: 1137.2 mL          Lab Data                        10.2   11.80 )-----------( 211      ( 12 Jan 2023 06:02 )             32.3     01-12    141  |  104  |  127<H>  ----------------------------<  125<H>  4.5   |  22  |  4.35<H>    Ca    8.9      12 Jan 2023 06:02  Phos  7.4     01-12  Mg     2.1     01-12    TPro  8.3  /  Alb  2.1<L>  /  TBili  1.0  /  DBili  x   /  AST  51<H>  /  ALT  48  /  AlkPhos  301<H>  01-12            Review of Systems	      Objective     Physical Examination    heart s1s2  lung dec BS  head nc      Pertinent Lab findings & Imaging      Leo:  NO   Adequate UO     I&O's Detail    11 Jan 2023 07:01  -  12 Jan 2023 07:00  --------------------------------------------------------  IN:    DOBUTamine: 268.4 mL    Enteral Tube Flush: 250 mL    Nepro with Carb Steady: 840 mL    Propofol: 107.8 mL  Total IN: 1466.2 mL    OUT:    Chest Tube (mL): 0 mL    Chest Tube (mL): 0 mL    Indwelling Catheter - Urethral (mL): 375 mL    Rectal Tube (mL): 275 mL  Total OUT: 650 mL    Total NET: 816.2 mL      12 Jan 2023 07:01  -  13 Jan 2023 06:25  --------------------------------------------------------  IN:    DOBUTamine: 244 mL    Enteral Tube Flush: 750 mL    Nepro with Carb Steady: 800 mL    Propofol: 93.2 mL  Total IN: 1887.2 mL    OUT:    Chest Tube (mL): 0 mL    Chest Tube (mL): 0 mL    Indwelling Catheter - Urethral (mL): 400 mL    Rectal Tube (mL): 350 mL  Total OUT: 750 mL    Total NET: 1137.2 mL               Discussed with:     Cultures:	        Radiology

## 2023-01-13 NOTE — PROGRESS NOTE ADULT - SUBJECTIVE AND OBJECTIVE BOX
DOROTHY VOSS is a 62yMale , patient examined and chart reviewed.     INTERVAL HPI/ OVERNIGHT EVENTS:   Afebrile. Remains Vented sedated.    PAST MEDICAL & SURGICAL HISTORY:  Heart failure, systolic  CAD (coronary artery disease)  Hypertension  Nonischemic cardiomyopathy  COPD, moderate  2019 novel coronavirus disease (COVID-19)  Substance abuse  HLD (hyperlipidemia)  Cardiac LV ejection fraction 10-20%  AICD (automatic cardioverter/defibrillator) present  Cardiac LV ejection fraction 10-20%        For details regarding the patient's social history, family history, and other miscellaneous elements, please refer the initial infectious diseases consultation and/or the admitting history and physical examination for this admission.    ROS:  Unable to obtain due to : pt's condition    ALLERGIES  pork (Other)      Current inpatient medications :    ANTIBIOTICS/RELEVANT:    MEDICATIONS  (STANDING):  albuterol    90 MICROgram(s) HFA Inhaler 2 Puff(s) Inhalation every 6 hours  albuterol/ipratropium for Nebulization. 3 milliLiter(s) Nebulizer once  aMIOdarone    Tablet 200 milliGRAM(s) Oral daily  aspirin  chewable 81 milliGRAM(s) Oral daily  chlorhexidine 0.12% Liquid 15 milliLiter(s) Oral Mucosa every 12 hours  dextrose 5%. 1000 milliLiter(s) (100 mL/Hr) IV Continuous <Continuous>  dextrose 50% Injectable 25 Gram(s) IV Push once  dextrose 50% Injectable 12.5 Gram(s) IV Push once  dextrose 50% Injectable 25 Gram(s) IV Push once  DOBUTamine Infusion 5 MICROgram(s)/kG/Min (12.2 mL/Hr) IV Continuous <Continuous>  glucagon  Injectable 1 milliGRAM(s) IntraMuscular once  heparin   Injectable 5000 Unit(s) SubCutaneous every 8 hours  insulin lispro (ADMELOG) corrective regimen sliding scale   SubCutaneous every 6 hours  ipratropium 17 MICROgram(s) HFA Inhaler 1 Puff(s) Inhalation every 6 hours  nystatin Powder 1 Application(s) Topical two times a day  pantoprazole  Injectable 40 milliGRAM(s) IV Push daily  propofol Infusion 10 MICROgram(s)/kG/Min (4.9 mL/Hr) IV Continuous <Continuous>    MEDICATIONS  (PRN):  dextrose Oral Gel 15 Gram(s) Oral once PRN Blood Glucose LESS THAN 70 milliGRAM(s)/deciliter        Objective:  Vital Signs Last 24 Hrs  T(C): 36.6 (13 Jan 2023 16:00), Max: 37.2 (13 Jan 2023 07:51)  T(F): 97.8 (13 Jan 2023 16:00), Max: 99 (13 Jan 2023 07:51)  HR: 79 (13 Jan 2023 18:00) (79 - 96)  BP: 123/77 (13 Jan 2023 18:00) (111/78 - 135/85)  BP(mean): 91 (13 Jan 2023 18:00) (88 - 105)  RR: 18 (13 Jan 2023 18:00) (14 - 25)  SpO2: 97% (13 Jan 2023 18:00) (95% - 100%)    Parameters below as of 13 Jan 2023 18:00  Patient On (Oxygen Delivery Method): ventilator    O2 Concentration (%): 30      O2 Concentration (%): 35  Mode: AC/ CMV (Assist Control/ Continuous Mandatory Ventilation), RR (machine): 20, TV (machine): 400, FiO2: 30, PEEP: 5, ITime: 1, MAP: 10, PIP: 15    Physical Exam:  General: vented sedated +NGT  Neck: supple, trachea midline  Lungs: decreased no wheeze/rhonchi Left chest tubes x 2  Cardiovascular: regular rate and rhythm, S1 S2  Abdomen: soft, nontender,  bowel sounds normal  Neurological: sedated  Skin: no rash  Extremities: +edema        LABS:                                   9.7    11.43 )-----------( 219      ( 13 Jan 2023 06:00 )             30.6   01-13    142  |  103  |  141<H>  ----------------------------<  120<H>  4.1   |  22  |  4.85<H>    Ca    9.3      13 Jan 2023 06:00  Phos  7.3     01-13  Mg     2.9     01-13    TPro  8.3  /  Alb  2.1<L>  /  TBili  1.0  /  DBili  x   /  AST  51<H>  /  ALT  48  /  AlkPhos  301<H>  01-12        MICROBIOLOGY:  Culture - Sputum . (12.24.22 @ 17:43)    Gram Stain:   Numerous polymorphonuclear leukocytes per low power field  No Squamous epithelial cells per low power field  Rare Gram Positive Rods seen per oil power field    -  Amikacin: S <=16    -  Aztreonam: S 8    -  Cefepime: S <=2    -  Ceftazidime: S 4    -  Ciprofloxacin: S <=0.25    -  Gentamicin: S 4    -  Imipenem: S <=1    -  Levofloxacin: S <=0.5    -  Meropenem: S <=1    -  Piperacillin/Tazobactam: S <=8    -  Tobramycin: S <=2    Specimen Source: ET Tube ET Tube    Culture Results:   Rare Pseudomonas aeruginosa  Normal Respiratory Cathy absent    Organism Identification: Pseudomonas aeruginosa    Organism: Pseudomonas aeruginosa    Method Type: BAY    Culture - Urine (12.23.22 @ 11:16)    -  Tobramycin: S <=2    -  Amoxicillin/Clavulanic Acid: S <=8/4    -  Ampicillin: R 16 These ampicillin results predict results for amoxicillin    -  Ampicillin/Sulbactam: S <=4/2 Enterobacter, Klebsiella aerogenes, Citrobacter, and Serratia may develop resistance during prolonged therapy (3-4 days)    -  Amikacin: S <=16    -  Cefazolin: S <=2    -  Cefoxitin: S <=8    -  Aztreonam: S <=4    -  Cefepime: S <=2    -  Imipenem: S <=1    -  Levofloxacin: S <=0.5    -  Ceftriaxone: S <=1 Enterobacter, Klebsiella aerogenes, Citrobacter, and Serratia may develop resistance during prolonged therapy    -  Ciprofloxacin: S <=0.25    -  Trimethoprim/Sulfamethoxazole: S <=0.5/9.5    -  Meropenem: S <=1    -  Nitrofurantoin: S <=32 Should not be used to treat pyelonephritis    -  Ertapenem: S <=0.5    -  Gentamicin: S <=2    -  Piperacillin/Tazobactam: S <=8    Specimen Source: Clean Catch Clean Catch (Midstream)    Culture Results:   >100,000 CFU/ml Klebsiella oxytoca/Raoultella ornithinolytica    Organism Identification: Klebsiella oxytoca /Raoutella ornithinolytica    Organism: Klebsiella oxytoca /Raoutella ornithinolytica    Method Type: BAY      Culture - Blood (12.23.22 @ 14:06)    Specimen Source: .Blood Blood-Peripheral    Culture Results:   No Growth Final      RADIOLOGY & ADDITIONAL STUDIES:    ACC: [id number] EXAM:  CT CHEST                          PROCEDURE DATE:  12/29/2022          INTERPRETATION:  HISTORY: Admitting Dxs: J96.01 RESP DISTRESS    EXAMINATION: CT CHEST was performed without IV contrast.    COMPARISON: 12/3/2022.    FINDINGS:    AIRWAYS, LUNGS, PLEURA: Satisfactory positioning of endotracheal tube.   Mild central airways secretions. Unchanged small loculated left   pneumothorax. Small chronic loculated right pleural effusion unchanged.   Right basilar and right middlelobe atelectasis. Emphysema.    Left chest tube in place with distal tip along the medial and upper   pleural space.    MEDIASTINUM: Cardiomegaly. No pericardial effusion. Thoracic aorta normal   caliber.  No large mediastinal lymph nodes. ICD lead terminates in the   right ventricle.    IMAGED ABDOMEN: Enteric catheter terminates in the stomach.   Cholelithiasis. Decreased abdominal extraluminal gas.    SOFT TISSUES: Decreased subcutaneous soft tissue emphysema.    BONES: Unremarkable.      IMPRESSION:.    Unchanged small loculated left pneumothorax.    Unchanged small loculated and chronic right pleural effusion.    Decreased subcutaneous soft tissue emphysema and extraluminal abdominal   gas.      ACC: [id number] EXAM:  XR CHEST PORTABLE IMMED 1V                        ACC: [id number] EXAM:  XR CHEST PORTABLE URGENT 1V                        ACC: [id number] EXAM:  XR CHEST PORTABLE URGENT 1V                        ACC: [id number] EXAM:  XR CHEST PORTABLE IMMED 1V                          PROCEDURE DATE:  01/03/2023          INTERPRETATION:  TIME OF EXAM: January 2, 2023 at 11:46 PM and 11:47 PM.    CLINICAL INFORMATION: Status post cardiac arrest. Respiratory distress   and abnormal chest sounds.    COMPARISON:  CT scan of the chest from December 29, 2022.    TECHNIQUE:   AP Portable chest x-ray.    INTERPRETATION:    The cardiac silhouette is enlarged. There is a left chest wall ICD with   intact lead with tip in expected region of the right ventricle. The   generator obscures part of the left lung.  The mediastinum is not accurately evaluated on these images.  ET tube tip is approximately 7.4 cm above the jennie.  Enteric tube tip is near the GE junction with side port in the distal   esophagus.  Left chest tube not significantly changed in position.  No significant change in small loculated right pleural effusion and right   mid and lower lung opacities. Question small pneumothorax associated with   the pleural effusion versus interposed aerated lung. Question tiny right   apical pneumothorax versus apical bulla.  Large left pneumothorax, increased in size. Concern for tension as there   is inversion of the left hemidiaphragm and widening of the rib   interspaces on the left compared to the right. Atelectasis of underlying   lung.  No acute bony abnormality.    AP portable chest x-ray from January 3, 2023 at 12:05 AM and 12:06 AM:    CLINICAL INFORMATION: Pneumothorax.    INTERPRETATION:    ET tube tip is 5.7 cm above the jennie. Enteric tube and left chest tube   unchanged in position.  Large left pneumothorax with concerns for tension, not significantly   changed.  Right-sided findings not significantly changed.    AP portable chest x-ray from January 3, 2023 at 12:53 AM and 12:54 AM:    CLINICAL INFORMATION: Replaced left chest tube.    INTERPRETATION:    ET tube tip is 6.2 cm above the jennie.  Enteric tube tip near GE junction with side port in the distal esophagus.  Left chest tube with tip projecting over the aortic knob.  Large left pneumothorax with concerns for tension, not significantly   changed. Continued atelectasis of underlying lung.  New left subcutaneous emphysema.  Small loculated right pleural effusion with likely associated passive   atelectasis is not significantly changed. Once again a small pneumothorax   component is not excluded. In addition, continued question of minimal   right apical pneumothorax versus apical bulla.    AP portable chest x-ray from January 3, 2023 at 1:35 AM:    CLINICAL INFORMATION: Chest tube.    INTERPRETATION:    ET tube tip approximately 4 cm above the jennie.  Enteric tube tip is likely in the stomach with the side port near the GE   junction.  Left chest wall ICD again seen.  Small loculated right pleural effusion with right mid and lower lung   opacities, not significantly changed. Previous concern for small   associated pneumothorax not seen. Question tiny right apical pneumothorax   versus apical bulla, unchanged.  Left chest tube unchanged in position. Left pneumothorax has resolved.   Left subcutaneous emphysema is slightly decreased. No left pleural   effusion.        IMPRESSION:  Left chest tube unchanged in position on the most recent   image with resolution of left pneumothorax. Slight decrease in left   subcutaneous emphysema.    Enteric tube tip likely in the stomach with side port near the GE   junction. Consider advancing the enteric tube. Discussed with Dr. Dobbins   with read back at 9:03 AM on January 3, 2023 by Dr. Sarmiento.    Small loculated right pleural effusion with right mid and lower lung   opacities, possibly atelectasis, although infection not excluded, not   significantly changed.    Continued question tiny right apical pneumothorax versus apical bulla.      Assessment :  Pt is a 62M CAD, Dilated cardiomyopathy, S/p AICD, Afib/flutter, h/o substance abuse, CKD baseline creat approx 1.4 last admitted to [facility] with MSSA bacteremia, and CHF.    Sent from [facility] with acute hypoxic respiratory failure. Was initially on BiPAP then became hypoxic.  Anesthesia intubated patient.  On post intubation Xray pneumothorax evident.  Patient with PEA arrest.  Chest tube placed by ED physician.  S/p PEA arrest  AHRF requiring intubation  Aspiration PNA   Loculated pleural effusions  - imaging reviewed  - BCx negative   - Sputum Cx -- Pseudomonas  - UCx -- Klebsiella  - WBC stable, afebrile  - s/p zosyn x10d course completed 1/1/2023  - pneumothorax  with Left CT x 2  -1/3 WBC up trending and hypothermic- restarted on Antibiotic- Meropenam as there was a concern for recurrent pneumonia  Off Meropenam since 1/10  WBC downtrending    Plan:  - Monitor off antibiotics  - Sp Meropenam x 7 days ended 1/10/23  - trend temps/WBC--stable  - maintain aspiration precautions  - chest tubes per pulm critical care  - vent management per ICU care  - GOC per primary team  - Overall prog poor      Continue with present regiment.  Appropriate use of antibiotics and adverse effects reviewed.      Critical care > 35 minutes were spent in direct patient care reviewing notes, medications ,labs data/ imaging , discussion with multidisciplinary team.    Thank you for allowing me to participate in care of your patient .    Kelley Phan MD  Infectious Disease  [phone]

## 2023-01-13 NOTE — PROGRESS NOTE ADULT - SUBJECTIVE AND OBJECTIVE BOX
Patient is a 62y old  Male who presents with a chief complaint of resp failure (13 Jan 2023 18:43)      BRIEF HOSPITAL COURSE: 62M with HTN, dilated CM s/p AICD, CKD, opioid / substance abuse, AFib/Flutter admitted with acute hypoxic respiratory failure requiring intubation. Course complicated by L PTX s/p intubation and PEA arrest with chest tube emergently placed by ED and subsequent ROSC. Admitted to SPCU with course further complicated with Aspiration PNA, shock, and DERRICK on CKD.    Events last 24 hours: Dobutamine remains at 5mcg/kg/min. DERRICK continues to worsen. Ethics had GOC with family, decided on no escalation of current care.      PAST MEDICAL & SURGICAL HISTORY:  Heart failure, systolic      CAD (coronary artery disease)      Hypertension      Nonischemic cardiomyopathy      COPD, moderate      2019 novel coronavirus disease (COVID-19)      Substance abuse      HLD (hyperlipidemia)      Cardiac LV ejection fraction 10-20%      AICD (automatic cardioverter/defibrillator) present      Cardiac LV ejection fraction 10-20%          Review of Systems: Intubated and sedated      Medications:    aMIOdarone    Tablet 200 milliGRAM(s) Oral daily  DOBUTamine Infusion 5 MICROgram(s)/kG/Min IV Continuous <Continuous>    albuterol    90 MICROgram(s) HFA Inhaler 2 Puff(s) Inhalation every 6 hours  albuterol/ipratropium for Nebulization. 3 milliLiter(s) Nebulizer once  ipratropium 17 MICROgram(s) HFA Inhaler 1 Puff(s) Inhalation every 6 hours    propofol Infusion 10 MICROgram(s)/kG/Min IV Continuous <Continuous>      aspirin  chewable 81 milliGRAM(s) Oral daily  heparin   Injectable 5000 Unit(s) SubCutaneous every 8 hours    pantoprazole  Injectable 40 milliGRAM(s) IV Push daily      dextrose 50% Injectable 25 Gram(s) IV Push once  dextrose 50% Injectable 12.5 Gram(s) IV Push once  dextrose 50% Injectable 25 Gram(s) IV Push once  dextrose Oral Gel 15 Gram(s) Oral once PRN  glucagon  Injectable 1 milliGRAM(s) IntraMuscular once  insulin lispro (ADMELOG) corrective regimen sliding scale   SubCutaneous every 6 hours    dextrose 5%. 1000 milliLiter(s) IV Continuous <Continuous>      chlorhexidine 0.12% Liquid 15 milliLiter(s) Oral Mucosa every 12 hours  nystatin Powder 1 Application(s) Topical two times a day        Mode: AC/ CMV (Assist Control/ Continuous Mandatory Ventilation)  RR (machine): 20  TV (machine): 400  FiO2: 30  PEEP: 5  ITime: 1  MAP: 14  PIP: 21      ICU Vital Signs Last 24 Hrs  T(C): 36.6 (13 Jan 2023 16:00), Max: 37.2 (13 Jan 2023 07:51)  T(F): 97.8 (13 Jan 2023 16:00), Max: 99 (13 Jan 2023 07:51)  HR: 79 (13 Jan 2023 18:00) (79 - 96)  BP: 123/77 (13 Jan 2023 18:00) (111/78 - 135/85)  BP(mean): 91 (13 Jan 2023 18:00) (88 - 105)  ABP: --  ABP(mean): --  RR: 18 (13 Jan 2023 18:00) (14 - 25)  SpO2: 97% (13 Jan 2023 18:00) (95% - 100%)    O2 Parameters below as of 13 Jan 2023 18:00  Patient On (Oxygen Delivery Method): ventilator    O2 Concentration (%): 30            I&O's Detail    12 Jan 2023 07:01  -  13 Jan 2023 07:00  --------------------------------------------------------  IN:    DOBUTamine: 244 mL    Enteral Tube Flush: 750 mL    Nepro with Carb Steady: 800 mL    Propofol: 93.2 mL  Total IN: 1887.2 mL    OUT:    Chest Tube (mL): 0 mL    Chest Tube (mL): 0 mL    Indwelling Catheter - Urethral (mL): 400 mL    Rectal Tube (mL): 350 mL  Total OUT: 750 mL    Total NET: 1137.2 mL      13 Jan 2023 07:01  -  13 Jan 2023 18:51  --------------------------------------------------------  IN:    DOBUTamine: 146.4 mL    Enteral Tube Flush: 250 mL    Nepro with Carb Steady: 480 mL    Propofol: 58.8 mL  Total IN: 935.2 mL    OUT:    Chest Tube (mL): 0 mL    Chest Tube (mL): 0 mL    Indwelling Catheter - Urethral (mL): 400 mL    Rectal Tube (mL): 100 mL  Total OUT: 500 mL    Total NET: 435.2 mL            LABS:                        9.7    11.43 )-----------( 219      ( 13 Jan 2023 06:00 )             30.6     01-13    142  |  103  |  141<H>  ----------------------------<  120<H>  4.1   |  22  |  4.85<H>    Ca    9.3      13 Jan 2023 06:00  Phos  7.3     01-13  Mg     2.9     01-13    TPro  8.3  /  Alb  2.1<L>  /  TBili  1.0  /  DBili  x   /  AST  51<H>  /  ALT  48  /  AlkPhos  301<H>  01-12          CAPILLARY BLOOD GLUCOSE      POCT Blood Glucose.: 115 mg/dL (13 Jan 2023 18:07)        CULTURES:      Physical Examination:    General: No acute distress. Sedated    HEENT: Pupils equal, reactive to light.  Symmetric.    PULM: Breathing comfortably on ventilator, good BS B/L with no Rales or Rhonchi, no significant sputum production    CVS: Regular rate and rhythm, no murmurs, rubs, or gallops    ABD: BS+ Soft, nondistended, nontender, no masses    EXT: No edema, nontender    SKIN: Warm and well perfused, no rashes noted.

## 2023-01-13 NOTE — PROGRESS NOTE ADULT - ASSESSMENT
62M CAD, Dilated cardiomyopathy, S/p AICD, Afib/flutter, h/o substance abuse,  presented with acute hypoxic and hypercarbic respiratory failure associated with pneumothorax.      ptx  resp failure  arnoldo hx  CM  AICD  AF    multidisciplinary meeting held on 1/12 -   decisions to be made in the near future for discussion and GOC and care planning

## 2023-01-13 NOTE — PROGRESS NOTE ADULT - ASSESSMENT
The patient is a 62 year old male with a history of CAD, chronic systolic heart failure s/p ICD, atrial flutter s/p DCCV, substance abuse, CKD who is admitted with respiratory failure in the setting of tension pneumothorax complicated by cardiac arrest.    Plan:  - ECG with sinus tachycardia and known LBBB  - Echo 2/22 with severely reduced LV (EF 10%) and RV function, mod MR, mod TR, mild pulm HTN  - Not on beta blocker as the patient is on dobutamine. Not a candidate for ACEI/ARB/ARNI at the current time due to renal function.  - Poor renal function - hold apixaban  - Hold bumetanide and spironolactone due to DERRICK  - Continue amiodarone 200 mg daily  - Continue aspirin 81 mg daily  - Continue dobutamine 5 mcg/kg/min - will continue at this dose until plans for trach, etc. and attempt to wean after  - Dobutamine dose should not be lowered for NSVT  - Worsening renal function despite inotropic therapy  - Mechanical ventilation  - ICU care  - Overall poor prognosis. Comfort care would be most appropriate. Awaiting next steps from palliative care et al. as there is no NOK.

## 2023-01-13 NOTE — PROGRESS NOTE ADULT - SUBJECTIVE AND OBJECTIVE BOX
NEPHROLOGY PROGRESS NOTE    CHIEF COMPLAINT:  DERRICK/CKD    HPI:  Remains vented, low FiO2, on dobutamine, renal function continues to worsen, UO only 400 mL    EXAM:  T(F): 98.3 (01-13-23 @ 12:16)  HR: 80 (01-13-23 @ 14:29)  BP: 119/80 (01-13-23 @ 14:00)  RR: 16 (01-13-23 @ 14:00)  SpO2: 96% (01-13-23 @ 14:29)    Unresponsive, vented  Normal respiratory effort, lungs clear bilaterally  Heart RRR with no murmur, presacral edema present         LABS                             9.7    11.43 )-----------( 219      ( 13 Jan 2023 06:00 )             30.6          01-13    142  |  103  |  141<H>  ----------------------------<  120<H>  4.1   |  22  |  4.85<H>    Ca    9.3      13 Jan 2023 06:00  Phos  7.3     01-13  Mg     2.9     01-13    TPro  8.3  /  Alb  2.1<L>  /  TBili  1.0  /  DBili  x   /  AST  51<H>  /  ALT  48  /  AlkPhos  301<H>  01-12           Impression  1.  DERRICK on CKD-3 (Baseline creatinine 1.3-1.5): Ischemic ATN, worsening again, oliguric  2.  Acute respiratory failure and PEA arrest  3.  S/p large left sided tension pneumothorax  4.  Chronic systolic heart failure    Recommend  Continue supportive care  Not a dialytic candidate due to severe CM, numerous comorbidities. Short or long term HD will carry more risk than benefit

## 2023-01-13 NOTE — PROGRESS NOTE ADULT - ASSESSMENT
63 y/o M CAD, Dilated cardiomyopathy, S/p AICD, Afib/flutter, h/o substance abuse,  presented with acute hypoxic and hypercarbic respiratory failure associated with pneumothorax which led to cardiac arrest       Acute respiratory failure secondary to tension pneumothorax with leading to cardiac arrest   - continue vent management;    - continue chest tubes to suction for now.   - duoneb inhalation   - serial xrays    Aspiration pneumonia /Sputum Cx pseudomonas    completed zosyn x10 days , now off abx    hypernatremia  - resolved    Cardiomyopathy, Acute on chronic systolic CHF   - hold eliquis   - on dobutamine drip per cardiology  - amiodarone    DM2  - FS monitoring with lispro coverage scale while critically ill    DERRICK on CKD  - Likely ATN/ischemic   - monitor creatinine: improved but now increasing again.   - avoid nephrotoxic agents  - per renal : dialysis would likely cause harm, not provide for any meaningful recovery.     Prophylactic measure  - DVT proph: heparin SQ   - GI proph: protonix      Ethics consult appreciated.  Patient is in multiorgan system failure with no overall improvement in prognosis.  no further escalation in care would be appropriate

## 2023-01-13 NOTE — PROGRESS NOTE ADULT - ASSESSMENT
62M with HTN, dilated CM s/p AICD, CKD, opioid / substance abuse, AFib/Flutter admitted with acute hypoxic respiratory failure requiring intubation. Course complicated by L PTX s/p intubation and PEA arrest with chest tube emergently placed by ED and subsequent ROSC. Admitted to SPCU with course further complicated with Aspiration PNA, shock, and DERRICK on CKD.    Acute Hypoxic Respiratory Failure s/t L tension PTX with subsequent cardiac arrest  Aspiration PNA  Acute Systolic CHF / Shock iso Dilated Cardiomyopathy  DERRICK on CKD likely iso ATN    Plan:  NEURO: Sedated with propofol and PRN fentanyl  CARDIAC: Remains in shock on dobutamine 5 mcg/kg/min. Continue Amiodarone 200mg daily  RESPIRATORY: Remains on Full vent support. Titrating settings to maintain SpO2 >92%. Chest tube remains to suction  GI: NPO with tube feeds. PPI for stress ulcer ppx  : DERRICK iso ATN. Cr and UOP worsening, Nephrology following, deemed patient not candidate for HD, continue to trend Cr and monitor I&Os. Goal UOP >0.5 cc/kg/hr  ENDO: ISS, q6h FS while on TFs  ID: Afebrile, WBC stable at 11k. S/p full course of zosyn and meropenem. Continue to monitor off abx  HEME: SQH for DVT ppx    DISPO: SPCU  Ethics had GOC with family, decided upon no escalation of current care.

## 2023-01-13 NOTE — PROGRESS NOTE ADULT - SUBJECTIVE AND OBJECTIVE BOX
Chief Complaint: Respiratory failure    Interval Events: No events overnight.    Review of Systems:  Unable to obtain    Physical Exam:  Vital Signs Last 24 Hrs  T(C): 37.2 (13 Jan 2023 07:51), Max: 37.2 (13 Jan 2023 07:51)  T(F): 99 (13 Jan 2023 07:51), Max: 99 (13 Jan 2023 07:51)  HR: 84 (13 Jan 2023 08:00) (78 - 96)  BP: 114/75 (13 Jan 2023 08:00) (111/78 - 133/93)  BP(mean): 88 (13 Jan 2023 08:00) (88 - 105)  RR: 17 (13 Jan 2023 08:00) (14 - 25)  SpO2: 97% (13 Jan 2023 08:00) (95% - 100%)  Parameters below as of 13 Jan 2023 08:00  Patient On (Oxygen Delivery Method): ventilator  O2 Concentration (%): 30  General: Sedated  HEENT: Intubated  Neck: No JVD, no carotid bruit  Lungs: CTAB  CV: RRR, nl S1/S2, no M/R/G  Abdomen: S/NT/ND, +BS  Extremities: No LE edema, no cyanosis  Neuro: AAOx0  Skin: No rash    Labs:    01-13    142  |  103  |  141<H>  ----------------------------<  120<H>  4.1   |  22  |  4.85<H>    Ca    9.3      13 Jan 2023 06:00  Phos  7.3     01-13  Mg     2.9     01-13    TPro  8.3  /  Alb  2.1<L>  /  TBili  1.0  /  DBili  x   /  AST  51<H>  /  ALT  48  /  AlkPhos  301<H>  01-12                        9.7    11.43 )-----------( 219      ( 13 Jan 2023 06:00 )             30.6         ECG/Telemetry: Sinus rhythm

## 2023-01-13 NOTE — PROGRESS NOTE ADULT - SUBJECTIVE AND OBJECTIVE BOX
INTERVAL HPI/OVERNIGHT EVENTS:    MEDICATIONS  (STANDING):  albuterol    90 MICROgram(s) HFA Inhaler 2 Puff(s) Inhalation every 6 hours  albuterol/ipratropium for Nebulization. 3 milliLiter(s) Nebulizer once  aMIOdarone    Tablet 200 milliGRAM(s) Oral daily  aspirin  chewable 81 milliGRAM(s) Oral daily  chlorhexidine 0.12% Liquid 15 milliLiter(s) Oral Mucosa every 12 hours  dextrose 5%. 1000 milliLiter(s) (100 mL/Hr) IV Continuous <Continuous>  dextrose 50% Injectable 25 Gram(s) IV Push once  dextrose 50% Injectable 12.5 Gram(s) IV Push once  dextrose 50% Injectable 25 Gram(s) IV Push once  DOBUTamine Infusion 5 MICROgram(s)/kG/Min (12.2 mL/Hr) IV Continuous <Continuous>  glucagon  Injectable 1 milliGRAM(s) IntraMuscular once  heparin   Injectable 5000 Unit(s) SubCutaneous every 8 hours  insulin lispro (ADMELOG) corrective regimen sliding scale   SubCutaneous every 6 hours  ipratropium 17 MICROgram(s) HFA Inhaler 1 Puff(s) Inhalation every 6 hours  nystatin Powder 1 Application(s) Topical two times a day  pantoprazole  Injectable 40 milliGRAM(s) IV Push daily  propofol Infusion 10 MICROgram(s)/kG/Min (4.9 mL/Hr) IV Continuous <Continuous>    MEDICATIONS  (PRN):  dextrose Oral Gel 15 Gram(s) Oral once PRN Blood Glucose LESS THAN 70 milliGRAM(s)/deciliter      Allergies    No Known Allergies    Intolerances    pork (Other)      Review of Systems:    General:  No wt loss, fevers, chills, night sweats,fatigue,   Eyes:  Good vision, no reported pain  ENT:  No sore throat, pain, runny nose, dysphagia  CV:  No pain, palpitatioins, hypo/hypertension  Resp:  No dyspnea, cough, tachypnea, wheezing  GI:  No pain, No nausea, No vomiting, No diarrhea, No constipatiion, No weight loss, No fever, No pruritis, No rectal bleeding, No tarry stools, No dysphagia,  :  No pain, bleeding, incontinence, nocturia  Muscle:  No pain, weakness  Neuro:  No weakness, tingling, memory problems  Psych:  No fatigue, insomnia, mood problems, depression  Endocrine:  No polyuria, polydypsia, cold/heat intolerance  Heme:  No petechiae, ecchymosis, easy bruisability  Skin:  No rash, tattoos, scars, edema      Vital Signs Last 24 Hrs  T(C): 37.2 (13 Jan 2023 07:51), Max: 37.2 (13 Jan 2023 07:51)  T(F): 99 (13 Jan 2023 07:51), Max: 99 (13 Jan 2023 07:51)  HR: 84 (13 Jan 2023 08:00) (78 - 96)  BP: 114/75 (13 Jan 2023 08:00) (111/78 - 133/93)  BP(mean): 88 (13 Jan 2023 08:00) (88 - 105)  RR: 17 (13 Jan 2023 08:00) (14 - 25)  SpO2: 97% (13 Jan 2023 08:00) (95% - 100%)    Parameters below as of 13 Jan 2023 08:00  Patient On (Oxygen Delivery Method): ventilator    O2 Concentration (%): 30    PHYSICAL EXAM:    Constitutional: NAD, well-developed  HEENT: EOMI, throat clear  Neck: No LAD, supple  Respiratory: CTA and P  Cardiovascular: S1 and S2, RRR, no M  Gastrointestinal: BS+, soft, NT/ND, neg HSM,  Extremities: No peripheral edema, neg clubing, cyanosis  Vascular: 2+ peripheral pulses  Neurological: A/O x 3, no focal deficits  Psychiatric: Normal mood, normal affect  Skin: No rashes      LABS:                        9.7    11.43 )-----------( 219      ( 13 Jan 2023 06:00 )             30.6     01-13    142  |  103  |  141<H>  ----------------------------<  120<H>  4.1   |  22  |  4.85<H>    Ca    9.3      13 Jan 2023 06:00  Phos  7.3     01-13  Mg     2.9     01-13    TPro  8.3  /  Alb  2.1<L>  /  TBili  1.0  /  DBili  x   /  AST  51<H>  /  ALT  48  /  AlkPhos  301<H>  01-12          RADIOLOGY & ADDITIONAL TESTS:

## 2023-01-13 NOTE — PROGRESS NOTE ADULT - SUBJECTIVE AND OBJECTIVE BOX
DOROTHY VOSS    [facility]    Allergies    No Known Allergies    Intolerances    pork (Other)      PAST MEDICAL & SURGICAL HISTORY:  Heart failure, systolic      CAD (coronary artery disease)      Hypertension      Nonischemic cardiomyopathy      COPD, moderate      2019 novel coronavirus disease (COVID-19)      Substance abuse      HLD (hyperlipidemia)      Cardiac LV ejection fraction 10-20%      AICD (automatic cardioverter/defibrillator) present      Cardiac LV ejection fraction 10-20%          FAMILY HISTORY:  FH: lung cancer        Home Medications:  acetaminophen 325 mg oral tablet: 2 tab(s) orally every 6 hours, As needed, Temp greater or equal to 38C (100.4F), Mild Pain (1 - 3) (23 Dec 2022 10:07)  apixaban 5 mg oral tablet: 1 tab(s) orally every 12 hours (23 Dec 2022 10:07)  Aquaphor Healing topical ointment: Apply topically to affected area once a day (23 Dec 2022 10:07)  ascorbic acid 500 mg oral tablet: 1 tab(s) orally once a day (23 Dec 2022 10:07)  Bacid (LAC) oral capsule: 2 cap(s) orally once a day (23 Dec 2022 10:07)  bumetanide 2 mg oral tablet: 1 tab(s) orally 2 times a day (23 Dec 2022 10:07)  gabapentin 100 mg oral capsule: 1 cap(s) orally 3 times a day (23 Dec 2022 10:07)  melatonin 3 mg oral tablet: 1 tab(s) orally once a day (at bedtime), As needed, Insomnia (23 Dec 2022 10:07)  Multiple Vitamins with Minerals oral tablet: 1 tab(s) orally once a day (23 Dec 2022 10:07)  pantoprazole 40 mg oral delayed release tablet: 1 tab(s) orally once a day (before a meal) (23 Dec 2022 10:07)  sacubitril-valsartan 24 mg-26 mg oral tablet: 1 tab(s) orally 2 times a day (23 Dec 2022 10:07)  simethicone 80 mg oral tablet: 2 tab(s) orally every 6 hours, As Needed (23 Dec 2022 10:07)  spironolactone 25 mg oral tablet: 1 tab(s) orally once a day (23 Dec 2022 10:07)  Symbicort 160 mcg-4.5 mcg/inh inhalation aerosol: 2 puff(s) inhaled 2 times a day (23 Dec 2022 10:07)  Ventolin HFA 90 mcg/inh inhalation aerosol: 2 puff(s) inhaled every 6 hours, As Needed (23 Dec 2022 10:07)      MEDICATIONS  (STANDING):  albuterol    90 MICROgram(s) HFA Inhaler 2 Puff(s) Inhalation every 6 hours  albuterol/ipratropium for Nebulization. 3 milliLiter(s) Nebulizer once  aMIOdarone    Tablet 200 milliGRAM(s) Oral daily  aspirin  chewable 81 milliGRAM(s) Oral daily  chlorhexidine 0.12% Liquid 15 milliLiter(s) Oral Mucosa every 12 hours  dextrose 5%. 1000 milliLiter(s) (100 mL/Hr) IV Continuous <Continuous>  dextrose 50% Injectable 25 Gram(s) IV Push once  dextrose 50% Injectable 12.5 Gram(s) IV Push once  dextrose 50% Injectable 25 Gram(s) IV Push once  DOBUTamine Infusion 5 MICROgram(s)/kG/Min (12.2 mL/Hr) IV Continuous <Continuous>  glucagon  Injectable 1 milliGRAM(s) IntraMuscular once  heparin   Injectable 5000 Unit(s) SubCutaneous every 8 hours  insulin lispro (ADMELOG) corrective regimen sliding scale   SubCutaneous every 6 hours  ipratropium 17 MICROgram(s) HFA Inhaler 1 Puff(s) Inhalation every 6 hours  nystatin Powder 1 Application(s) Topical two times a day  pantoprazole  Injectable 40 milliGRAM(s) IV Push daily  propofol Infusion 10 MICROgram(s)/kG/Min (4.9 mL/Hr) IV Continuous <Continuous>    MEDICATIONS  (PRN):  dextrose Oral Gel 15 Gram(s) Oral once PRN Blood Glucose LESS THAN 70 milliGRAM(s)/deciliter      Diet, NPO with Tube Feed:   Tube Feeding Modality: Nasogastric  Nepro with Carb Steady  Total Volume for 24 Hours (mL): 960  Continuous  Starting Tube Feed Rate mL per Hour: 20  Increase Tube Feed Rate by (mL): 10     Every 4 hours  Until Goal Tube Feed Rate (mL per Hour): 40  Tube Feed Duration (in Hours): 24  Tube Feed Start Time: 13:00  Free Water Flush  Pump   Rate (mL per Hour): 30 (12-26-22 @ 23:12) [Active]          Vital Signs Last 24 Hrs  T(C): 36.4 (13 Jan 2023 04:05), Max: 36.9 (12 Jan 2023 16:00)  T(F): 97.6 (13 Jan 2023 04:05), Max: 98.5 (12 Jan 2023 16:00)  HR: 83 (13 Jan 2023 06:41) (75 - 96)  BP: 120/76 (13 Jan 2023 06:00) (108/76 - 133/93)  BP(mean): 90 (13 Jan 2023 06:00) (87 - 105)  RR: 16 (13 Jan 2023 06:00) (14 - 25)  SpO2: 97% (13 Jan 2023 06:41) (95% - 100%)    Parameters below as of 13 Jan 2023 06:41  Patient On (Oxygen Delivery Method): ventilator,.30          01-11-23 @ 07:01  -  01-12-23 @ 07:00  --------------------------------------------------------  IN: 1466.2 mL / OUT: 650 mL / NET: 816.2 mL    01-12-23 @ 07:01  -  01-13-23 @ 06:45  --------------------------------------------------------  IN: 1887.2 mL / OUT: 750 mL / NET: 1137.2 mL        Mode: AC/ CMV (Assist Control/ Continuous Mandatory Ventilation), RR (machine): 20, TV (machine): 400, FiO2: 30, PEEP: 5, ITime: 1, MAP: 13, PIP: 30      LABS:                        10.2   11.80 )-----------( 211      ( 12 Jan 2023 06:02 )             32.3     01-12    141  |  104  |  127<H>  ----------------------------<  125<H>  4.5   |  22  |  4.35<H>    Ca    8.9      12 Jan 2023 06:02  Phos  7.4     01-12  Mg     2.1     01-12    TPro  8.3  /  Alb  2.1<L>  /  TBili  1.0  /  DBili  x   /  AST  51<H>  /  ALT  48  /  AlkPhos  301<H>  01-12              WBC:  WBC Count: 11.80 K/uL (01-12 @ 06:02)  WBC Count: 11.92 K/uL (01-11 @ 06:21)  WBC Count: 16.61 K/uL (01-09 @ 06:46)      MICROBIOLOGY:  RECENT CULTURES:                  Sodium:  Sodium, Serum: 141 mmol/L (01-12 @ 06:02)  Sodium, Serum: 142 mmol/L (01-11 @ 06:21)  Sodium, Serum: 142 mmol/L (01-09 @ 06:46)      4.35 mg/dL 01-12 @ 06:02  3.85 mg/dL 01-11 @ 06:21  3.46 mg/dL 01-09 @ 06:46      Hemoglobin:  Hemoglobin: 10.2 g/dL (01-12 @ 06:02)  Hemoglobin: 11.4 g/dL (01-11 @ 06:21)  Hemoglobin: 11.7 g/dL (01-09 @ 06:46)      Platelets: Platelet Count - Automated: 211 K/uL (01-12 @ 06:02)  Platelet Count - Automated: 252 K/uL (01-11 @ 06:21)  Platelet Count - Automated: 291 K/uL (01-09 @ 06:46)      LIVER FUNCTIONS - ( 12 Jan 2023 06:02 )  Alb: 2.1 g/dL / Pro: 8.3 g/dL / ALK PHOS: 301 U/L / ALT: 48 U/L DA / AST: 51 U/L / GGT: x                 RADIOLOGY & ADDITIONAL STUDIES:      MICROBIOLOGY:  RECENT CULTURES:

## 2023-01-14 NOTE — PROGRESS NOTE ADULT - SUBJECTIVE AND OBJECTIVE BOX
Date/Time Patient Seen:  		  Referring MD:   Data Reviewed	       Patient is a 62y old  Male who presents with a chief complaint of resp failure (14 Jan 2023 16:09)      Subjective/HPI     PAST MEDICAL & SURGICAL HISTORY:  Heart failure, systolic    CAD (coronary artery disease)    Hypertension    Nonischemic cardiomyopathy    COPD, moderate    2019 novel coronavirus disease (COVID-19)    Substance abuse    HLD (hyperlipidemia)    Cardiac LV ejection fraction 10-20%    No significant past surgical history    AICD (automatic cardioverter/defibrillator) present    Cardiac LV ejection fraction 10-20%          Medication list         MEDICATIONS  (STANDING):  albuterol    90 MICROgram(s) HFA Inhaler 2 Puff(s) Inhalation every 6 hours  albuterol/ipratropium for Nebulization. 3 milliLiter(s) Nebulizer once  aMIOdarone    Tablet 200 milliGRAM(s) Oral daily  aspirin  chewable 81 milliGRAM(s) Oral daily  chlorhexidine 0.12% Liquid 15 milliLiter(s) Oral Mucosa every 12 hours  dextrose 5%. 1000 milliLiter(s) (100 mL/Hr) IV Continuous <Continuous>  dextrose 50% Injectable 25 Gram(s) IV Push once  dextrose 50% Injectable 12.5 Gram(s) IV Push once  dextrose 50% Injectable 25 Gram(s) IV Push once  DOBUTamine Infusion 5 MICROgram(s)/kG/Min (12.2 mL/Hr) IV Continuous <Continuous>  glucagon  Injectable 1 milliGRAM(s) IntraMuscular once  heparin   Injectable 5000 Unit(s) SubCutaneous every 8 hours  insulin lispro (ADMELOG) corrective regimen sliding scale   SubCutaneous every 6 hours  ipratropium 17 MICROgram(s) HFA Inhaler 1 Puff(s) Inhalation every 6 hours  nystatin Powder 1 Application(s) Topical two times a day  pantoprazole  Injectable 40 milliGRAM(s) IV Push daily  propofol Infusion 10 MICROgram(s)/kG/Min (4.9 mL/Hr) IV Continuous <Continuous>    MEDICATIONS  (PRN):  dextrose Oral Gel 15 Gram(s) Oral once PRN Blood Glucose LESS THAN 70 milliGRAM(s)/deciliter  glycopyrrolate Injectable 0.2 milliGRAM(s) IV Push every 6 hours PRN for secretions         Vitals log        ICU Vital Signs Last 24 Hrs  T(C): 36.2 (14 Jan 2023 08:00), Max: 36.2 (13 Jan 2023 20:00)  T(F): 97.1 (14 Jan 2023 08:00), Max: 97.1 (13 Jan 2023 20:00)  HR: 79 (14 Jan 2023 13:45) (79 - 101)  BP: 119/83 (14 Jan 2023 12:00) (110/74 - 129/83)  BP(mean): 93 (14 Jan 2023 12:00) (85 - 104)  ABP: --  ABP(mean): --  RR: 19 (14 Jan 2023 12:00) (15 - 27)  SpO2: 98% (14 Jan 2023 13:45) (86% - 100%)    O2 Parameters below as of 14 Jan 2023 13:42  Patient On (Oxygen Delivery Method): ventilator,30             Mode: AC/ CMV (Assist Control/ Continuous Mandatory Ventilation)  RR (machine): 20  TV (machine): 400  FiO2: 30  PEEP: 5  ITime: 1  MAP: 15  PIP: 38      Input and Output:  I&O's Detail    13 Jan 2023 07:01  -  14 Jan 2023 07:00  --------------------------------------------------------  IN:    DOBUTamine: 292.8 mL    Enteral Tube Flush: 750 mL    Nepro with Carb Steady: 960 mL    Propofol: 137.1 mL  Total IN: 2139.9 mL    OUT:    Chest Tube (mL): 0 mL    Chest Tube (mL): 0 mL    Indwelling Catheter - Urethral (mL): 750 mL    Rectal Tube (mL): 100 mL  Total OUT: 850 mL    Total NET: 1289.9 mL          Lab Data                        10.8   11.05 )-----------( 221      ( 14 Jan 2023 06:00 )             34.5     01-14    142  |  103  |  144<H>  ----------------------------<  111<H>  4.9   |  18<L>  |  4.69<H>    Ca    8.7      14 Jan 2023 06:00  Phos  8.1     01-14  Mg     3.0     01-14              Review of Systems	      Objective     Physical Examination    heart s1s2  lung dec BS      Pertinent Lab findings & Imaging      Leo:  NO   Adequate UO     I&O's Detail    13 Jan 2023 07:01  -  14 Jan 2023 07:00  --------------------------------------------------------  IN:    DOBUTamine: 292.8 mL    Enteral Tube Flush: 750 mL    Nepro with Carb Steady: 960 mL    Propofol: 137.1 mL  Total IN: 2139.9 mL    OUT:    Chest Tube (mL): 0 mL    Chest Tube (mL): 0 mL    Indwelling Catheter - Urethral (mL): 750 mL    Rectal Tube (mL): 100 mL  Total OUT: 850 mL    Total NET: 1289.9 mL               Discussed with:     Cultures:	        Radiology

## 2023-01-14 NOTE — PROGRESS NOTE ADULT - SUBJECTIVE AND OBJECTIVE BOX
NEPHROLOGY PROGRESS NOTE    CHIEF COMPLAINT:  DERRICK/CKD    HPI:  Condition unchanged.  Renal function with little change.   mL.    EXAM:  T(F): 97.1 (01-14-23 @ 16:00)  HR: 84 (01-14-23 @ 17:36)  BP: 120/85 (01-14-23 @ 16:00)  RR: 19 (01-14-23 @ 16:00)  SpO2: 99% (01-14-23 @ 17:36)    Vented, sedated  Normal respiratory effort, lungs clear bilaterally  Heart RRR with no murmur, no peripheral edema         LABS                             10.8   11.05 )-----------( 221      ( 14 Jan 2023 06:00 )             34.5          01-14    142  |  103  |  144<H>  ----------------------------<  111<H>  4.9   |  18<L>  |  4.69<H>    Ca    8.7      14 Jan 2023 06:00  Phos  8.1     01-14  Mg     3.0     01-14      Impression  1.  DERRICK on CKD-3 (Baseline creatinine 1.3-1.5): Ischemic ATN, borderline oliguric  2.  Acute respiratory failure and PEA arrest  3.  S/p large left sided tension pneumothorax  4.  Chronic systolic heart failure    Recommend  Continue supportive care  Not a dialytic candidate due to severe CM, numerous comorbidities. Short or long term HD will carry more risk than benefit

## 2023-01-14 NOTE — PROGRESS NOTE ADULT - ASSESSMENT
REVIEW OF SYMPTOMS      Able to give (reliable) ROS  NO     PHYSICAL EXAM    HEENT Unremarkable  atraumatic   RESP Fair air entry EXP prolonged    Harsh breath sound Resp distres mild   CARDIAC S1 S2 No S3     NO JVD    ABDOMEN SOFT BS PRESENT NOT DISTENDED No hepatosplenomegaly   PEDAL EDEMA present No calf tenderness  NO rash       GENERAL DATA .   GOC.   12/23/2022 full code  ALLGY.     nka                  WT.     12/24/2022 81           BMI.       12/24/2022 26          ICU STAY. .. 12/23/2022  COVID. ..  12/23/2022 scv2 (-)   BEST PRACTICE ISSUES.    HOB ELEVATN. Yes  DVT PPLX. ..    12/23/2022 hpsc   WHITMORE PPLX. ..    12/23/2022 protonix 40   INFN PPLX. ..  12/23/2022 chlorhexidine .12%   SP SW VIVIAN.         DIET.  .. 12/26 nepro 960 ng    IV fl...  FREE WATER 1/9/2023 free water 250.3   PROCEDURES...   12/23/2022  l chest tube   12/23/2022 intubated   12/23/2022 reed       ABGS.  1/6/2023 vent 30% 739/28/65     VS/ PO/IO/ VENT/ DRIPS.  1/14/2023 100 110/80   1/14/2023 12p dobu 5 m/k/m   1/14/2023 12p prop 10 m/k/m   1/14/2023 20/400/5/.3     AGE doa cc.  62 m doa 12/23/2022 resp distress    PREV ADMISSION 2/11-2/25/2022 [facility] P    PMH  PMH COPD  PMH COVID (+) 2/11/2022   PMH S aureus bacteremia mssa 2/11   .. Ancef 2/12/2022 Dr Phan  [facility] AICD  PMH HFREF  .. ECHO 11/20/2021 ef 20%  PMH A fib (on eliquis)     PROBLEMS ASSESSMENT RECOMMENDATIONS.  VENT   .. bundle dsv dsbt ltvv pplat 30 (-) PO2 60 (+) ph 7.3 (+)  .. lung protective ventilatn   .. wean as told if fails then trach  PROLONGED INTUBATN.  .. Plan trach vs GOC determination   WEANING  .. If tolerates cpap will plan extrubation   SECRETIONS.  .. 1/14/2023 glycopyrrolate 0.2 4/dp   BRONCHOSPASM.  .. 12/25 albuterol hfa  .. 12/25 atrovent hfa   SEDATION.  .. dexm 12/30-1/6  .. prop 1/6 -->   .. target rass 0 to (-) 1   HEMOPTYSIS   .. 1/3 given ddavp 25  .. 5 d meropenem started 1/3   .. 1/5/2023 no further hemoptysis   INFECTION.  .. w 1/6-1/9-1/12-1/13-1/14/2023 w 19  - 16- 11.8- 11.4- 11  .. 1/6/2023 Has leukocytosis   MICRO  .. mrsa 12/23 (+)   .. 12/24 et pseudo  .. 12/23  klebsiella   ANTIBIO.  .. 12/23-1/1 zosyn completed  .. 1/3-1/10 meroipenem  PLAN  .. 1/3 meroipenem started as hemoptysis leukocytosi   .. 1/13/2023 off abio   A fib chronic   .. 12/30 amiodarone 200   DERRICK   .. Cr 1/2-1/9-1/12-1/13-1/14/2023 Cr 5.1 - 3.4- 4.3- 4.8- 4.6    .. HD if decided by renal   .. Creat improving   CHEST TUBE   .. 2nd chest tube placed on 1/3 because of worsening pntx   .. to be removed after trach or extubation which ever comes first   CHF.  .. ECHO 11/20/2021 ef 20%  .. On dobu started 1/3-->   ANEMIA.  .. 1/13/2023 Hb 9.7   NEURO  .. 1/10/2023 Not much progress Opens eyes some movement in lue   OVERALL.  .. GOC determination   .. 1/12/2023 4:39 PM Multidisciplinary meeting was held    TIME SPENT   Over 39 minutes aggregate critical care time spent on encounter; activities included   direct patient care, counseling and/or coordinating care reviewing notes, lab data/ imaging , discussion with multidisciplinary team/ patient  /family and explaining in detail risks, benefits, alternatives  of the recommendations     BIPIN DE LA CRUZ 61 m 12/23/2022 1962 DR KELVIN VANESSA

## 2023-01-14 NOTE — PROGRESS NOTE ADULT - SUBJECTIVE AND OBJECTIVE BOX
DOROTHY VOSS is a 62yMale , patient examined and chart reviewed.     INTERVAL HPI/ OVERNIGHT EVENTS:   Afebrile. Remains Vented sedated.  No change in status.     PAST MEDICAL & SURGICAL HISTORY:  Heart failure, systolic  CAD (coronary artery disease)  Hypertension  Nonischemic cardiomyopathy  COPD, moderate  2019 novel coronavirus disease (COVID-19)  Substance abuse  HLD (hyperlipidemia)  Cardiac LV ejection fraction 10-20%  AICD (automatic cardioverter/defibrillator) present  Cardiac LV ejection fraction 10-20%        For details regarding the patient's social history, family history, and other miscellaneous elements, please refer the initial infectious diseases consultation and/or the admitting history and physical examination for this admission.    ROS:  Unable to obtain due to : pt's condition    ALLERGIES  pork (Other)      Current inpatient medications :    ANTIBIOTICS/RELEVANT:    MEDICATIONS  (STANDING):  albuterol    90 MICROgram(s) HFA Inhaler 2 Puff(s) Inhalation every 6 hours  albuterol/ipratropium for Nebulization. 3 milliLiter(s) Nebulizer once  aMIOdarone    Tablet 200 milliGRAM(s) Oral daily  aspirin  chewable 81 milliGRAM(s) Oral daily  chlorhexidine 0.12% Liquid 15 milliLiter(s) Oral Mucosa every 12 hours  dextrose 5%. 1000 milliLiter(s) (100 mL/Hr) IV Continuous <Continuous>  dextrose 50% Injectable 25 Gram(s) IV Push once  dextrose 50% Injectable 12.5 Gram(s) IV Push once  dextrose 50% Injectable 25 Gram(s) IV Push once  DOBUTamine Infusion 5 MICROgram(s)/kG/Min (12.2 mL/Hr) IV Continuous <Continuous>  glucagon  Injectable 1 milliGRAM(s) IntraMuscular once  heparin   Injectable 5000 Unit(s) SubCutaneous every 8 hours  insulin lispro (ADMELOG) corrective regimen sliding scale   SubCutaneous every 6 hours  ipratropium 17 MICROgram(s) HFA Inhaler 1 Puff(s) Inhalation every 6 hours  nystatin Powder 1 Application(s) Topical two times a day  pantoprazole  Injectable 40 milliGRAM(s) IV Push daily  propofol Infusion 10 MICROgram(s)/kG/Min (4.9 mL/Hr) IV Continuous <Continuous>    MEDICATIONS  (PRN):  dextrose Oral Gel 15 Gram(s) Oral once PRN Blood Glucose LESS THAN 70 milliGRAM(s)/deciliter  glycopyrrolate Injectable 0.2 milliGRAM(s) IV Push every 6 hours PRN for secretions      Objective:  Vital Signs Last 24 Hrs  T(C): 36.2 (14 Jan 2023 16:00), Max: 36.3 (14 Jan 2023 12:00)  T(F): 97.1 (14 Jan 2023 16:00), Max: 97.3 (14 Jan 2023 12:00)  HR: 82 (14 Jan 2023 20:20) (79 - 101)  BP: 122/82 (14 Jan 2023 18:00) (113/72 - 128/97)  BP(mean): 95 (14 Jan 2023 18:00) (85 - 104)  RR: 15 (14 Jan 2023 18:00) (15 - 25)  SpO2: 100% (14 Jan 2023 20:20) (96% - 100%)    Parameters below as of 14 Jan 2023 20:20  Patient On (Oxygen Delivery Method): ventilator    Mode: AC/ CMV (Assist Control/ Continuous Mandatory Ventilation), RR (machine): 20, TV (machine): 400, FiO2: 30, PEEP: 5, ITime: 1, MAP: 10, PIP: 15    Physical Exam:  General: vented sedated +NGT  Neck: supple, trachea midline  Lungs: decreased no wheeze/rhonchi Left chest tubes x 2  Cardiovascular: regular rate and rhythm, S1 S2  Abdomen: soft, nontender,  bowel sounds normal  Neurological: sedated  Skin: no rash  Extremities: +edema        LABS:                                 10.8   11.05 )-----------( 221      ( 14 Jan 2023 06:00 )             34.5   01-14    142  |  103  |  144<H>  ----------------------------<  111<H>  4.9   |  18<L>  |  4.69<H>    Ca    8.7      14 Jan 2023 06:00  Phos  8.1     01-14  Mg     3.0     01-14      MICROBIOLOGY:  Culture - Sputum . (12.24.22 @ 17:43)    Gram Stain:   Numerous polymorphonuclear leukocytes per low power field  No Squamous epithelial cells per low power field  Rare Gram Positive Rods seen per oil power field    -  Amikacin: S <=16    -  Aztreonam: S 8    -  Cefepime: S <=2    -  Ceftazidime: S 4    -  Ciprofloxacin: S <=0.25    -  Gentamicin: S 4    -  Imipenem: S <=1    -  Levofloxacin: S <=0.5    -  Meropenem: S <=1    -  Piperacillin/Tazobactam: S <=8    -  Tobramycin: S <=2    Specimen Source: ET Tube ET Tube    Culture Results:   Rare Pseudomonas aeruginosa  Normal Respiratory Cathy absent    Organism Identification: Pseudomonas aeruginosa    Organism: Pseudomonas aeruginosa    Method Type: BAY    Culture - Urine (12.23.22 @ 11:16)    -  Tobramycin: S <=2    -  Amoxicillin/Clavulanic Acid: S <=8/4    -  Ampicillin: R 16 These ampicillin results predict results for amoxicillin    -  Ampicillin/Sulbactam: S <=4/2 Enterobacter, Klebsiella aerogenes, Citrobacter, and Serratia may develop resistance during prolonged therapy (3-4 days)    -  Amikacin: S <=16    -  Cefazolin: S <=2    -  Cefoxitin: S <=8    -  Aztreonam: S <=4    -  Cefepime: S <=2    -  Imipenem: S <=1    -  Levofloxacin: S <=0.5    -  Ceftriaxone: S <=1 Enterobacter, Klebsiella aerogenes, Citrobacter, and Serratia may develop resistance during prolonged therapy    -  Ciprofloxacin: S <=0.25    -  Trimethoprim/Sulfamethoxazole: S <=0.5/9.5    -  Meropenem: S <=1    -  Nitrofurantoin: S <=32 Should not be used to treat pyelonephritis    -  Ertapenem: S <=0.5    -  Gentamicin: S <=2    -  Piperacillin/Tazobactam: S <=8    Specimen Source: Clean Catch Clean Catch (Midstream)    Culture Results:   >100,000 CFU/ml Klebsiella oxytoca/Raoultella ornithinolytica    Organism Identification: Klebsiella oxytoca /Raoutella ornithinolytica    Organism: Klebsiella oxytoca /Raoutella ornithinolytica    Method Type: BAY      Culture - Blood (12.23.22 @ 14:06)    Specimen Source: .Blood Blood-Peripheral    Culture Results:   No Growth Final      RADIOLOGY & ADDITIONAL STUDIES:    ACC: [id number] EXAM:  CT CHEST                          PROCEDURE DATE:  12/29/2022          INTERPRETATION:  HISTORY: Admitting Dxs: J96.01 RESP DISTRESS    EXAMINATION: CT CHEST was performed without IV contrast.    COMPARISON: 12/3/2022.    FINDINGS:    AIRWAYS, LUNGS, PLEURA: Satisfactory positioning of endotracheal tube.   Mild central airways secretions. Unchanged small loculated left   pneumothorax. Small chronic loculated right pleural effusion unchanged.   Right basilar and right middlelobe atelectasis. Emphysema.    Left chest tube in place with distal tip along the medial and upper   pleural space.    MEDIASTINUM: Cardiomegaly. No pericardial effusion. Thoracic aorta normal   caliber.  No large mediastinal lymph nodes. ICD lead terminates in the   right ventricle.    IMAGED ABDOMEN: Enteric catheter terminates in the stomach.   Cholelithiasis. Decreased abdominal extraluminal gas.    SOFT TISSUES: Decreased subcutaneous soft tissue emphysema.    BONES: Unremarkable.      IMPRESSION:.    Unchanged small loculated left pneumothorax.    Unchanged small loculated and chronic right pleural effusion.    Decreased subcutaneous soft tissue emphysema and extraluminal abdominal   gas.      ACC: [id number] EXAM:  XR CHEST PORTABLE IMMED 1V                        ACC: [id number] EXAM:  XR CHEST PORTABLE URGENT 1V                        ACC: [id number] EXAM:  XR CHEST PORTABLE URGENT 1V                        ACC: [id number] EXAM:  XR CHEST PORTABLE IMMED 1V                          PROCEDURE DATE:  01/03/2023          INTERPRETATION:  TIME OF EXAM: January 2, 2023 at 11:46 PM and 11:47 PM.    CLINICAL INFORMATION: Status post cardiac arrest. Respiratory distress   and abnormal chest sounds.    COMPARISON:  CT scan of the chest from December 29, 2022.    TECHNIQUE:   AP Portable chest x-ray.    INTERPRETATION:    The cardiac silhouette is enlarged. There is a left chest wall ICD with   intact lead with tip in expected region of the right ventricle. The   generator obscures part of the left lung.  The mediastinum is not accurately evaluated on these images.  ET tube tip is approximately 7.4 cm above the jennie.  Enteric tube tip is near the GE junction with side port in the distal   esophagus.  Left chest tube not significantly changed in position.  No significant change in small loculated right pleural effusion and right   mid and lower lung opacities. Question small pneumothorax associated with   the pleural effusion versus interposed aerated lung. Question tiny right   apical pneumothorax versus apical bulla.  Large left pneumothorax, increased in size. Concern for tension as there   is inversion of the left hemidiaphragm and widening of the rib   interspaces on the left compared to the right. Atelectasis of underlying   lung.  No acute bony abnormality.    AP portable chest x-ray from January 3, 2023 at 12:05 AM and 12:06 AM:    CLINICAL INFORMATION: Pneumothorax.    INTERPRETATION:    ET tube tip is 5.7 cm above the jennie. Enteric tube and left chest tube   unchanged in position.  Large left pneumothorax with concerns for tension, not significantly   changed.  Right-sided findings not significantly changed.    AP portable chest x-ray from January 3, 2023 at 12:53 AM and 12:54 AM:    CLINICAL INFORMATION: Replaced left chest tube.    INTERPRETATION:    ET tube tip is 6.2 cm above the jennie.  Enteric tube tip near GE junction with side port in the distal esophagus.  Left chest tube with tip projecting over the aortic knob.  Large left pneumothorax with concerns for tension, not significantly   changed. Continued atelectasis of underlying lung.  New left subcutaneous emphysema.  Small loculated right pleural effusion with likely associated passive   atelectasis is not significantly changed. Once again a small pneumothorax   component is not excluded. In addition, continued question of minimal   right apical pneumothorax versus apical bulla.    AP portable chest x-ray from January 3, 2023 at 1:35 AM:    CLINICAL INFORMATION: Chest tube.    INTERPRETATION:    ET tube tip approximately 4 cm above the jennie.  Enteric tube tip is likely in the stomach with the side port near the GE   junction.  Left chest wall ICD again seen.  Small loculated right pleural effusion with right mid and lower lung   opacities, not significantly changed. Previous concern for small   associated pneumothorax not seen. Question tiny right apical pneumothorax   versus apical bulla, unchanged.  Left chest tube unchanged in position. Left pneumothorax has resolved.   Left subcutaneous emphysema is slightly decreased. No left pleural   effusion.        IMPRESSION:  Left chest tube unchanged in position on the most recent   image with resolution of left pneumothorax. Slight decrease in left   subcutaneous emphysema.    Enteric tube tip likely in the stomach with side port near the GE   junction. Consider advancing the enteric tube. Discussed with Dr. Dobbins   with read back at 9:03 AM on January 3, 2023 by Dr. Sarmiento.    Small loculated right pleural effusion with right mid and lower lung   opacities, possibly atelectasis, although infection not excluded, not   significantly changed.    Continued question tiny right apical pneumothorax versus apical bulla.      Assessment :  Pt is a 62M CAD, Dilated cardiomyopathy, S/p AICD, Afib/flutter, h/o substance abuse, CKD baseline creat approx 1.4 last admitted to [facility] with MSSA bacteremia, and CHF.    Sent from [facility] with acute hypoxic respiratory failure. Was initially on BiPAP then became hypoxic.  Anesthesia intubated patient.  On post intubation Xray pneumothorax evident.  Patient with PEA arrest.  Chest tube placed by ED physician.  S/p PEA arrest  AHRF requiring intubation  Aspiration PNA   Loculated pleural effusions  - imaging reviewed  - BCx negative   - Sputum Cx -- Pseudomonas  - UCx -- Klebsiella  - WBC stable, afebrile  - s/p zosyn x10d course completed 1/1/2023  - pneumothorax  with Left CT x 2  -1/3 WBC up trending and hypothermic- restarted on Antibiotic- Meropenam as there was a concern for recurrent pneumonia  Off Meropenam since 1/10  WBC downtrending    Plan:  - Monitor off antibiotics  - Sp Meropenam x 7 days ended 1/10/23  - trend temps/WBC--stable  - maintain aspiration precautions  - chest tubes per pulm critical care  - vent management per ICU care  - GOC per primary team  - Overall prog poor      Continue with present regiment.  Appropriate use of antibiotics and adverse effects reviewed.      Critical care > 35 minutes were spent in direct patient care reviewing notes, medications ,labs data/ imaging , discussion with multidisciplinary team.    Thank you for allowing me to participate in care of your patient .    Kelley Phan MD  Infectious Disease  [phone]

## 2023-01-14 NOTE — PROGRESS NOTE ADULT - ASSESSMENT
Impression - 62M with HTN, dilated CM s/p AICD, CKD, opioid / substance abuse, AFib/Flutter admitted with acute hypoxic respiratory failure requiring intubation. Course complicated by L PTX s/p intubation and PEA arrest with chest tube emergently placed by ED and subsequent ROSC. Admitted to SPCU with course further complicated with Aspiration PNA, shock, and DERRICK on CKD. Dobutamine remains at 5mcg/kg/min. DERRICK continues to worsen. Ethics had GOC with family, decided on no escalation of current care. Patient remains on full vent support, dobutamine gtt at 5mcg/kg, CTN lateral. Reed repositioned and irrigated with immediate output of 600cc of dark urine noted.     Acute hypoxic respiratory failure   Cardiac arrest  LT Tension PTX s/p pigtail  Aspiration PNA  DERRICK on CKD     Neuro - Propofol gtt for sedation, titrate to RASS 0/-1, most recent CTH unremarkable for acute pathology   CV - On Dobutamine gtt, remains on 5mcg/kg, remains MAP>65, continue to monitor Q1, prior TTE EF<10%, cardiology following and recs appreciated   Pulm - Vent on AC, maintain 6cc/kg of IBW, actively titrating FIO2 for SPO2>90%, currently at 30%, trend ABG, SAT/SBT as clinically warranted, continue albuterol, duonebs, and atrovent   GI - Continue TF w/Nepro   Renal - CTN lateral, reed cath for strict I/Os, reed irrigated with immediate output of 600cc of dark urine, trend lytes, replete as needed, nephro following - not a candidate for HD  Heme - On ASA, DVT PPx w/UFH  Endocrine - ISS protocol, strict glycemic control w/BG<180   ID - Abx course completed, leukocytosis downtrending, afebrile, continue to monitor for signs of active infection

## 2023-01-14 NOTE — PROGRESS NOTE ADULT - SUBJECTIVE AND OBJECTIVE BOX
Patient is a 62y old  Male who presents with a chief complaint of resp failure (14 Jan 2023 17:45)      BRIEF HOSPITAL COURSE: 62M with HTN, dilated CM s/p AICD, CKD, opioid / substance abuse, AFib/Flutter admitted with acute hypoxic respiratory failure requiring intubation. Course complicated by L PTX s/p intubation and PEA arrest with chest tube emergently placed by ED and subsequent ROSC. Admitted to [facility] with course further complicated with Aspiration PNA, shock, and DERRICK on CKD. Dobutamine remains at 5mcg/kg/min. DERRICK continues to worsen. Ethics had GOC with family, decided on no escalation of current care.    Events last 24 hours: Patient remains on full vent support, dobutamine gtt at 5mcg/kg, CTN lateral. Reed repositioned and irrigated with immediate output of 600cc of dark urine noted.     Unable to perform ROS 2/2 intubated/sedated      PAST MEDICAL & SURGICAL HISTORY:  Heart failure, systolic      CAD (coronary artery disease)      Hypertension      Nonischemic cardiomyopathy      COPD, moderate      2019 novel coronavirus disease (COVID-19)      Substance abuse      HLD (hyperlipidemia)      Cardiac LV ejection fraction 10-20%      AICD (automatic cardioverter/defibrillator) present      Cardiac LV ejection fraction 10-20%            Medications:    aMIOdarone    Tablet 200 milliGRAM(s) Oral daily  DOBUTamine Infusion 5 MICROgram(s)/kG/Min IV Continuous <Continuous>    albuterol    90 MICROgram(s) HFA Inhaler 2 Puff(s) Inhalation every 6 hours  albuterol/ipratropium for Nebulization. 3 milliLiter(s) Nebulizer once  ipratropium 17 MICROgram(s) HFA Inhaler 1 Puff(s) Inhalation every 6 hours    propofol Infusion 10 MICROgram(s)/kG/Min IV Continuous <Continuous>      aspirin  chewable 81 milliGRAM(s) Oral daily  heparin   Injectable 5000 Unit(s) SubCutaneous every 8 hours    glycopyrrolate Injectable 0.2 milliGRAM(s) IV Push every 6 hours PRN  pantoprazole  Injectable 40 milliGRAM(s) IV Push daily      dextrose 50% Injectable 25 Gram(s) IV Push once  dextrose 50% Injectable 12.5 Gram(s) IV Push once  dextrose 50% Injectable 25 Gram(s) IV Push once  dextrose Oral Gel 15 Gram(s) Oral once PRN  glucagon  Injectable 1 milliGRAM(s) IntraMuscular once  insulin lispro (ADMELOG) corrective regimen sliding scale   SubCutaneous every 6 hours    dextrose 5%. 1000 milliLiter(s) IV Continuous <Continuous>      chlorhexidine 0.12% Liquid 15 milliLiter(s) Oral Mucosa every 12 hours  nystatin Powder 1 Application(s) Topical two times a day        Mode: AC/ CMV (Assist Control/ Continuous Mandatory Ventilation)  RR (machine): 20  TV (machine): 400  FiO2: 30  PEEP: 5  ITime: 1  MAP: 12  PIP: 27      ICU Vital Signs Last 24 Hrs  T(C): 36.2 (14 Jan 2023 16:00), Max: 36.3 (14 Jan 2023 12:00)  T(F): 97.1 (14 Jan 2023 16:00), Max: 97.3 (14 Jan 2023 12:00)  HR: 82 (14 Jan 2023 20:20) (79 - 101)  BP: 122/82 (14 Jan 2023 18:00) (110/74 - 128/97)  BP(mean): 95 (14 Jan 2023 18:00) (85 - 104)  ABP: --  ABP(mean): --  RR: 15 (14 Jan 2023 18:00) (15 - 25)  SpO2: 100% (14 Jan 2023 20:20) (96% - 100%)    O2 Parameters below as of 14 Jan 2023 20:20  Patient On (Oxygen Delivery Method): ventilator                I&O's Detail    13 Jan 2023 07:01  -  14 Jan 2023 07:00  --------------------------------------------------------  IN:    DOBUTamine: 292.8 mL    Enteral Tube Flush: 750 mL    Nepro with Carb Steady: 960 mL    Propofol: 137.1 mL  Total IN: 2139.9 mL    OUT:    Chest Tube (mL): 0 mL    Chest Tube (mL): 0 mL    Indwelling Catheter - Urethral (mL): 750 mL    Rectal Tube (mL): 100 mL  Total OUT: 850 mL    Total NET: 1289.9 mL      14 Jan 2023 07:01  -  14 Jan 2023 22:24  --------------------------------------------------------  IN:    DOBUTamine: 146.4 mL    Enteral Tube Flush: 250 mL    Nepro with Carb Steady: 440 mL    Propofol: 58.8 mL  Total IN: 895.2 mL    OUT:    Chest Tube (mL): 0 mL    Chest Tube (mL): 0 mL    Indwelling Catheter - Urethral (mL): 700 mL    Rectal Tube (mL): 300 mL  Total OUT: 1000 mL    Total NET: -104.8 mL            LABS:                        10.8   11.05 )-----------( 221      ( 14 Jan 2023 06:00 )             34.5     01-14    142  |  103  |  144<H>  ----------------------------<  111<H>  4.9   |  18<L>  |  4.69<H>    Ca    8.7      14 Jan 2023 06:00  Phos  8.1     01-14  Mg     3.0     01-14            CAPILLARY BLOOD GLUCOSE      POCT Blood Glucose.: 114 mg/dL (14 Jan 2023 18:09)        CULTURES:      Physical Examination:    General: Intubated / sedated, +reed, rectal tube     HEENT: Pupils equal, reactive to light.  Symmetric.    PULM: Clear to auscultation bilaterally, no significant sputum production    NECK: Supple, no lymphadenopathy, trachea midline    CVS: Regular rate and rhythm, no murmurs, rubs, or gallops    ABD: Soft, nondistended, nontender, normoactive bowel sounds, no masses    EXT: No edema, nontender    SKIN: Warm and well perfused, no rashes noted.    NEURO: Sedated     DEVICES:     RADIOLOGY:    Critical Care time: 35 mins assessing presenting problems of acute illness that poses high probability of life threatening deterioration or end organ damage/dysfunction.  Medical decision making including Initiating plan of care, reviewing data, reviewing radiology, direct patient bedside evaluation and interpretation of vital signs, any necessary ventilator management, discussion with multidisciplinary team, discussing goals of care with patient/family, all non inclusive of procedures

## 2023-01-14 NOTE — PROGRESS NOTE ADULT - SUBJECTIVE AND OBJECTIVE BOX
Patient is a 62y old  Male who presents with a chief complaint of resp failure (14 Jan 2023 11:01)        HPI:  62M CAD, Dilated cardiomyopathy, S/p AICD, Afib/flutter, h/o substance abuse, CKD baseline creat approx 1.4 last admitted to [facility] with MSSA bacteremia, and CHF.  Sent from [facility] with acute hypoxic respiratory failure.  Was initially on BiPAP then became hypoxic.  Anesthesia intubated patient.  On post intubation Xray pneumothorax evident.  Patient with PEA arrest.  Chest tube placed by ED physician.  ROSC obtained.     Patient unable to give further history.     (23 Dec 2022 13:02)      SUBJECTIVE & OBJECTIVE: Pt seen and examined at bedside.     PHYSICAL EXAM:  T(C): 36.2 (01-14-23 @ 08:00), Max: 36.6 (01-13-23 @ 16:00)  HR: 100 (01-14-23 @ 12:00) (79 - 101)  BP: 119/83 (01-14-23 @ 12:00) (110/74 - 129/83)  RR: 19 (01-14-23 @ 12:00) (15 - 27)  SpO2: 98% (01-14-23 @ 12:00) (86% - 100%)  Wt(kg): --   GENERAL: NAD, intubated on vent   NECK: Supple, No JVD    CHEST/LUNG:decrease air entry at the bases   HEART: Regular rate and rhythm; No murmurs, rubs, or gallops  ABDOMEN: Soft, Nontender, Nondistended; Bowel sounds present  EXTREMITIES:  2+ Peripheral Pulses, No clubbing, cyanosis, or edema        MEDICATIONS  (STANDING):  albuterol    90 MICROgram(s) HFA Inhaler 2 Puff(s) Inhalation every 6 hours  albuterol/ipratropium for Nebulization. 3 milliLiter(s) Nebulizer once  aMIOdarone    Tablet 200 milliGRAM(s) Oral daily  aspirin  chewable 81 milliGRAM(s) Oral daily  chlorhexidine 0.12% Liquid 15 milliLiter(s) Oral Mucosa every 12 hours  dextrose 5%. 1000 milliLiter(s) (100 mL/Hr) IV Continuous <Continuous>  dextrose 50% Injectable 25 Gram(s) IV Push once  dextrose 50% Injectable 12.5 Gram(s) IV Push once  dextrose 50% Injectable 25 Gram(s) IV Push once  DOBUTamine Infusion 5 MICROgram(s)/kG/Min (12.2 mL/Hr) IV Continuous <Continuous>  glucagon  Injectable 1 milliGRAM(s) IntraMuscular once  heparin   Injectable 5000 Unit(s) SubCutaneous every 8 hours  insulin lispro (ADMELOG) corrective regimen sliding scale   SubCutaneous every 6 hours  ipratropium 17 MICROgram(s) HFA Inhaler 1 Puff(s) Inhalation every 6 hours  nystatin Powder 1 Application(s) Topical two times a day  pantoprazole  Injectable 40 milliGRAM(s) IV Push daily  propofol Infusion 10 MICROgram(s)/kG/Min (4.9 mL/Hr) IV Continuous <Continuous>    MEDICATIONS  (PRN):  dextrose Oral Gel 15 Gram(s) Oral once PRN Blood Glucose LESS THAN 70 milliGRAM(s)/deciliter  glycopyrrolate Injectable 0.2 milliGRAM(s) IV Push every 6 hours PRN for secretions      LABS:                        10.8   11.05 )-----------( 221      ( 14 Jan 2023 06:00 )             34.5     01-14    142  |  103  |  144<H>  ----------------------------<  111<H>  4.9   |  18<L>  |  4.69<H>    Ca    8.7      14 Jan 2023 06:00  Phos  8.1     01-14  Mg     3.0     01-14          Magnesium, Serum: 3.0 mg/dL (01-14 @ 06:00)    CAPILLARY BLOOD GLUCOSE      POCT Blood Glucose.: 100 mg/dL (14 Jan 2023 11:11)  POCT Blood Glucose.: 124 mg/dL (14 Jan 2023 05:20)  POCT Blood Glucose.: 110 mg/dL (13 Jan 2023 23:20)  POCT Blood Glucose.: 115 mg/dL (13 Jan 2023 18:07)      CAPILLARY BLOOD GLUCOSE      POCT Blood Glucose.: 100 mg/dL (14 Jan 2023 11:11)  POCT Blood Glucose.: 124 mg/dL (14 Jan 2023 05:20)  POCT Blood Glucose.: 110 mg/dL (13 Jan 2023 23:20)  POCT Blood Glucose.: 115 mg/dL (13 Jan 2023 18:07)    CAPILLARY BLOOD GLUCOSE      POCT Blood Glucose.: 100 mg/dL (14 Jan 2023 11:11)            RECENT CULTURES:      RADIOLOGY & ADDITIONAL TESTS:                        DVT/GI ppx  Discussed with pt @ bedside

## 2023-01-14 NOTE — PROGRESS NOTE ADULT - SUBJECTIVE AND OBJECTIVE BOX
DOROTHY VOSS    [facility]    Allergies    No Known Allergies    Intolerances    pork (Other)      PAST MEDICAL & SURGICAL HISTORY:  Heart failure, systolic      CAD (coronary artery disease)      Hypertension      Nonischemic cardiomyopathy      COPD, moderate      2019 novel coronavirus disease (COVID-19)      Substance abuse      HLD (hyperlipidemia)      Cardiac LV ejection fraction 10-20%      AICD (automatic cardioverter/defibrillator) present      Cardiac LV ejection fraction 10-20%          FAMILY HISTORY:  FH: lung cancer        Home Medications:  acetaminophen 325 mg oral tablet: 2 tab(s) orally every 6 hours, As needed, Temp greater or equal to 38C (100.4F), Mild Pain (1 - 3) (23 Dec 2022 10:07)  apixaban 5 mg oral tablet: 1 tab(s) orally every 12 hours (23 Dec 2022 10:07)  Aquaphor Healing topical ointment: Apply topically to affected area once a day (23 Dec 2022 10:07)  ascorbic acid 500 mg oral tablet: 1 tab(s) orally once a day (23 Dec 2022 10:07)  Bacid (LAC) oral capsule: 2 cap(s) orally once a day (23 Dec 2022 10:07)  bumetanide 2 mg oral tablet: 1 tab(s) orally 2 times a day (23 Dec 2022 10:07)  gabapentin 100 mg oral capsule: 1 cap(s) orally 3 times a day (23 Dec 2022 10:07)  melatonin 3 mg oral tablet: 1 tab(s) orally once a day (at bedtime), As needed, Insomnia (23 Dec 2022 10:07)  Multiple Vitamins with Minerals oral tablet: 1 tab(s) orally once a day (23 Dec 2022 10:07)  pantoprazole 40 mg oral delayed release tablet: 1 tab(s) orally once a day (before a meal) (23 Dec 2022 10:07)  sacubitril-valsartan 24 mg-26 mg oral tablet: 1 tab(s) orally 2 times a day (23 Dec 2022 10:07)  simethicone 80 mg oral tablet: 2 tab(s) orally every 6 hours, As Needed (23 Dec 2022 10:07)  spironolactone 25 mg oral tablet: 1 tab(s) orally once a day (23 Dec 2022 10:07)  Symbicort 160 mcg-4.5 mcg/inh inhalation aerosol: 2 puff(s) inhaled 2 times a day (23 Dec 2022 10:07)  Ventolin HFA 90 mcg/inh inhalation aerosol: 2 puff(s) inhaled every 6 hours, As Needed (23 Dec 2022 10:07)      MEDICATIONS  (STANDING):  albuterol    90 MICROgram(s) HFA Inhaler 2 Puff(s) Inhalation every 6 hours  albuterol/ipratropium for Nebulization. 3 milliLiter(s) Nebulizer once  aMIOdarone    Tablet 200 milliGRAM(s) Oral daily  aspirin  chewable 81 milliGRAM(s) Oral daily  chlorhexidine 0.12% Liquid 15 milliLiter(s) Oral Mucosa every 12 hours  dextrose 5%. 1000 milliLiter(s) (100 mL/Hr) IV Continuous <Continuous>  dextrose 50% Injectable 25 Gram(s) IV Push once  dextrose 50% Injectable 12.5 Gram(s) IV Push once  dextrose 50% Injectable 25 Gram(s) IV Push once  DOBUTamine Infusion 5 MICROgram(s)/kG/Min (12.2 mL/Hr) IV Continuous <Continuous>  glucagon  Injectable 1 milliGRAM(s) IntraMuscular once  heparin   Injectable 5000 Unit(s) SubCutaneous every 8 hours  insulin lispro (ADMELOG) corrective regimen sliding scale   SubCutaneous every 6 hours  ipratropium 17 MICROgram(s) HFA Inhaler 1 Puff(s) Inhalation every 6 hours  nystatin Powder 1 Application(s) Topical two times a day  pantoprazole  Injectable 40 milliGRAM(s) IV Push daily  propofol Infusion 10 MICROgram(s)/kG/Min (4.9 mL/Hr) IV Continuous <Continuous>    MEDICATIONS  (PRN):  dextrose Oral Gel 15 Gram(s) Oral once PRN Blood Glucose LESS THAN 70 milliGRAM(s)/deciliter  glycopyrrolate Injectable 0.2 milliGRAM(s) IV Push every 6 hours PRN for secretions      Diet, NPO with Tube Feed:   Tube Feeding Modality: Nasogastric  Nepro with Carb Steady  Total Volume for 24 Hours (mL): 960  Continuous  Starting Tube Feed Rate mL per Hour: 20  Increase Tube Feed Rate by (mL): 10     Every 4 hours  Until Goal Tube Feed Rate (mL per Hour): 40  Tube Feed Duration (in Hours): 24  Tube Feed Start Time: 13:00  Free Water Flush  Pump   Rate (mL per Hour): 30 (12-26-22 @ 23:12) [Active]          Vital Signs Last 24 Hrs  T(C): 36.2 (14 Jan 2023 08:00), Max: 36.8 (13 Jan 2023 12:16)  T(F): 97.1 (14 Jan 2023 08:00), Max: 98.3 (13 Jan 2023 12:16)  HR: 83 (14 Jan 2023 10:00) (79 - 101)  BP: 121/81 (14 Jan 2023 10:00) (110/74 - 135/85)  BP(mean): 93 (14 Jan 2023 10:00) (85 - 104)  RR: 18 (14 Jan 2023 10:00) (15 - 27)  SpO2: 99% (14 Jan 2023 10:00) (86% - 100%)    Parameters below as of 14 Jan 2023 10:00  Patient On (Oxygen Delivery Method): ventilator    O2 Concentration (%): 30      01-13-23 @ 07:01  -  01-14-23 @ 07:00  --------------------------------------------------------  IN: 2139.9 mL / OUT: 850 mL / NET: 1289.9 mL        Mode: AC/ CMV (Assist Control/ Continuous Mandatory Ventilation), RR (machine): 20, TV (machine): 400, FiO2: 30, PEEP: 5, ITime: 1, MAP: 13, PIP: 26      LABS:                        10.8   11.05 )-----------( 221      ( 14 Jan 2023 06:00 )             34.5     01-14    142  |  103  |  144<H>  ----------------------------<  111<H>  4.9   |  18<L>  |  4.69<H>    Ca    8.7      14 Jan 2023 06:00  Phos  8.1     01-14  Mg     3.0     01-14                WBC:  WBC Count: 11.05 K/uL (01-14 @ 06:00)  WBC Count: 11.43 K/uL (01-13 @ 06:00)  WBC Count: 11.80 K/uL (01-12 @ 06:02)  WBC Count: 11.92 K/uL (01-11 @ 06:21)      MICROBIOLOGY:  RECENT CULTURES:                  Sodium:  Sodium, Serum: 142 mmol/L (01-14 @ 06:00)  Sodium, Serum: 142 mmol/L (01-13 @ 06:00)  Sodium, Serum: 141 mmol/L (01-12 @ 06:02)  Sodium, Serum: 142 mmol/L (01-11 @ 06:21)      4.69 mg/dL 01-14 @ 06:00  4.85 mg/dL 01-13 @ 06:00  4.35 mg/dL 01-12 @ 06:02  3.85 mg/dL 01-11 @ 06:21      Hemoglobin:  Hemoglobin: 10.8 g/dL (01-14 @ 06:00)  Hemoglobin: 9.7 g/dL (01-13 @ 06:00)  Hemoglobin: 10.2 g/dL (01-12 @ 06:02)  Hemoglobin: 11.4 g/dL (01-11 @ 06:21)      Platelets: Platelet Count - Automated: 221 K/uL (01-14 @ 06:00)  Platelet Count - Automated: 219 K/uL (01-13 @ 06:00)  Platelet Count - Automated: 211 K/uL (01-12 @ 06:02)  Platelet Count - Automated: 252 K/uL (01-11 @ 06:21)              RADIOLOGY & ADDITIONAL STUDIES:      MICROBIOLOGY:  RECENT CULTURES:

## 2023-01-14 NOTE — PROGRESS NOTE ADULT - SUBJECTIVE AND OBJECTIVE BOX
INTERVAL HPI/OVERNIGHT EVENTS:  No new overnight event.  No N/V/D.  Tolerating diet via ngt    Allergies    No Known Allergies    Intolerances    pork (Other)    General:  No wt loss, fevers, chills, night sweats, fatigue,   Eyes:  Good vision, no reported pain  ENT:  No sore throat, pain, runny nose, dysphagia  CV:  No pain, palpitations, hypo/hypertension  Resp:  No dyspnea, cough, tachypnea, wheezing  GI:  No pain, No nausea, No vomiting, No diarrhea, No constipation, No weight loss, No fever, No pruritis, No rectal bleeding, No tarry stools, No dysphagia,  :  No pain, bleeding, incontinence, nocturia  Muscle:  No pain, weakness  Neuro:  No weakness, tingling, memory problems  Psych:  No fatigue, insomnia, mood problems, depression  Endocrine:  No polyuria, polydipsia, cold/heat intolerance  Heme:  No petechiae, ecchymosis, easy bruisability  Skin:  No rash, tattoos, scars, edema      PHYSICAL EXAM:   Vital Signs:  Vital Signs Last 24 Hrs  T(C): 36.2 (2023 08:00), Max: 36.2 (2023 20:00)  T(F): 97.1 (2023 08:00), Max: 97.1 (2023 20:00)  HR: 79 (2023 13:45) (79 - 101)  BP: 119/83 (2023 12:00) (110/74 - 129/83)  BP(mean): 93 (2023 12:00) (85 - 104)  RR: 19 (2023 12:00) (15 - 27)  SpO2: 98% (2023 13:45) (86% - 100%)    Parameters below as of 2023 13:42  Patient On (Oxygen Delivery Method): ventilator,30      Daily     Daily Weight in k (2023 05:00)I&O's Summary    2023 07:01  -  2023 07:00  --------------------------------------------------------  IN: 2139.9 mL / OUT: 850 mL / NET: 1289.9 mL        GENERAL:  Appears stated age, well-groomed, well-nourished, no distress  HEENT:  NC/AT,  conjunctivae clear and pink, no thyromegaly, nodules, adenopathy, no JVD, sclera -anicteric  CHEST:  Full & symmetric excursion, no increased effort, breath sounds clear  HEART:  Regular rhythm, S1, S2, no murmur/rub/S3/S4, no abdominal bruit, no edema  ABDOMEN:  Soft, non-tender, non-distended, normoactive bowel sounds,  no masses ,no hepato-splenomegaly, no signs of chronic liver disease  EXTEREMITIES:  no cyanosis,clubbing or edema  SKIN:  No rash/erythema/ecchymoses/petechiae/wounds/abscess/warm/dry  NEURO:  Alert, oriented, no asterixis, no tremor, no encephalopathy      LABS:                        10.8   11.05 )-----------( 221      ( 2023 06:00 )             34.5     01-14    142  |  103  |  144<H>  ----------------------------<  111<H>  4.9   |  18<L>  |  4.69<H>    Ca    8.7      2023 06:00  Phos  8.1     01-14  Mg     3.0     01-14          amylase   lipase  RADIOLOGY & ADDITIONAL TESTS:

## 2023-01-14 NOTE — PROGRESS NOTE ADULT - SUBJECTIVE AND OBJECTIVE BOX
Chief Complaint: Respiratory failure    Interval Events: No events overnight.    Review of Systems:  Unable to obtain    Physical Exam:  Vital Signs Last 24 Hrs  T(C): 36.2 (14 Jan 2023 08:00), Max: 36.8 (13 Jan 2023 12:16)  T(F): 97.1 (14 Jan 2023 08:00), Max: 98.3 (13 Jan 2023 12:16)  HR: 89 (14 Jan 2023 08:00) (79 - 101)  BP: 126/88 (14 Jan 2023 08:00) (110/74 - 135/85)  BP(mean): 99 (14 Jan 2023 08:00) (85 - 104)  RR: 16 (14 Jan 2023 08:00) (15 - 27)  SpO2: 100% (14 Jan 2023 08:00) (86% - 100%)  Parameters below as of 14 Jan 2023 08:00  Patient On (Oxygen Delivery Method): ventilator  O2 Concentration (%): 30  General: Sedated  HEENT: Intubated  Neck: No JVD, no carotid bruit  Lungs: CTAB  CV: RRR, nl S1/S2, no M/R/G  Abdomen: S/NT/ND, +BS  Extremities: No LE edema, no cyanosis  Neuro: AAOx0  Skin: No rash    Labs:    01-14    142  |  103  |  144<H>  ----------------------------<  111<H>  4.9   |  18<L>  |  4.69<H>    Ca    8.7      14 Jan 2023 06:00  Phos  8.1     01-14  Mg     3.0     01-14                          10.8   11.05 )-----------( 221      ( 14 Jan 2023 06:00 )             34.5         ECG/Telemetry: Sinus rhythm

## 2023-01-15 NOTE — CONSULT NOTE ADULT - SUBJECTIVE AND OBJECTIVE BOX
CHIEF COMPLAINT:  Patient is a 61y old  Male who presents with a chief complaint of Shortness of breath    HPI:  62 y/o male with a PMHx of CAD, Nonischemic Cardiomyopathy - CHF (EF in 2016 15-20%) s/p ICD (reports placed about 5 months ago by Dr. Shaikh), nonobstructive CAD, HTN presents to the ED c/o worsening SOB. His symptoms progressed over the last few days. Pt unable to walk short distances without feeling SOB, +orthopnea, +productive cough and abd bloating. Pt lives at the shelter where they have a high sodium diet. No chest pain, palp, fever, Gi issues, urinary issues.  Cardio - [facility]     Patient dx with CHF and started on lasix and admitted to tele.      Patient seen and at bedside. His SOB is improving but he does have some nausea, which he has been having since the SOB has been worsening with abd bloating, but again all is improving.    PMHx:  PAST MEDICAL & SURGICAL HISTORY:  Heart failure, systolic s/p ICD    CAD (coronary artery disease)    Hypertension    Nonischemic cardiomyopathy    Social History:  Living Situation: Shelter in [place]  Tobacco Use: Cigarettes, 3 per days x 35 years - used to be more  Alcohol Use: occasionally  Drug Use: THC occasionally    COVID VACCINE > Moderna x1, will get second dose in 2 weeks      FAMILY HISTORY:   FAMILY HISTORY:  No pertinent family history in first degree relatives    ALLERGIES:  Allergies  No Known Allergies    Intolerances  pork (Other)    REVIEW OF SYSTEMS:  10 system ROS was obtained, all pertinent positives and negatives are in HPI otherwise they were negative.    Daily Weight in k.4 (28 Oct 2021 07:07)    Vital Signs Last 24 Hrs  T(C): 36.5 (28 Oct 2021 07:43), Max: 37.2 (27 Oct 2021 20:45)  T(F): 97.7 (28 Oct 2021 07:43), Max: 99 (27 Oct 2021 20:45)  HR: 91 (28 Oct 2021 07:43) (90 - 112)  BP: 111/80 (28 Oct 2021 07:43) (111/80 - 120/88)  BP(mean): 97 (27 Oct 2021 20:45) (97 - 102)  RR: 18 (28 Oct 2021 07:43) (17 - 22)  SpO2: 91% (28 Oct 2021 07:43) (91% - 100%)    I&O's Summary    28 Oct 2021 07:01  -  28 Oct 2021 10:08  --------------------------------------------------------  IN: 240 mL / OUT: 0 mL / NET: 240 mL  -Not accurate      PHYSICAL EXAM:   Constitutional: awake and alert in NAD  HEENT: EOMI, Normal Hearing, MMM  Neck: Soft and supple, No LAD, + JVD, no carotid bruit  Respiratory: Breath sounds are clear bilaterally, No wheezing, rales or rhonchi, good air movement  Cardiovascular: S1 and S2, regular rate and rhythm, soft systolic murmur at LSB  Gastrointestinal: Bowel Sounds present, distended but soft, nontender, no guarding, no rebound  Extremities: Warm and well perfused, no peripheral edema  Neurological: A/O x 3, no focal deficits appreciated  Skin: No rashes appreciated    MEDICATIONS:  MEDICATIONS  (STANDING):  aspirin enteric coated 81 milliGRAM(s) Oral daily  atorvastatin 40 milliGRAM(s) Oral at bedtime  carvedilol 3.125 milliGRAM(s) Oral every 12 hours  furosemide   Injectable 40 milliGRAM(s) IV Push daily  heparin   Injectable 5000 Unit(s) SubCutaneous every 12 hours  losartan 25 milliGRAM(s) Oral daily  nicotine - 21 mG/24Hr(s) Patch 1 patch Transdermal daily      LABS: All Labs Reviewed:                        14.1   3.78  )-----------( 148      ( 28 Oct 2021 06:58 )             43.4     10-28    140  |  107  |  28<H>  ----------------------------<  90  4.3   |  28  |  1.20    Ca    8.4<L>      28 Oct 2021 06:58  Phos  3.4     10-27  Mg     2.3     10-28    TPro  6.9  /  Alb  3.6  /  TBili  1.3<H>  /  DBili  x   /  AST  39<H>  /  ALT  34  /  AlkPhos  194<H>  10-27    Serum Pro-Brain Natriuretic Peptide: 37734 pg/mL (10-27 @ 11:07)    RADIOLOGY:    Reviewed in EMR    EKG:    10/27/21, my interpretation: NSR at 92bpm, LAE, LAD, PRWP    TELEMETRY:    Reviewed. Remains in sinus mostly 90-100s but some episodes up to the 120s    ECHO:    Ordered and pending Yes...

## 2023-01-15 NOTE — PROGRESS NOTE ADULT - SUBJECTIVE AND OBJECTIVE BOX
[facility]    Allergies    No Known Allergies    Intolerances    pork (Other)      PAST MEDICAL & SURGICAL HISTORY:  Heart failure, systolic      CAD (coronary artery disease)      Hypertension      Nonischemic cardiomyopathy      COPD, moderate      2019 novel coronavirus disease (COVID-19)      Substance abuse      HLD (hyperlipidemia)      Cardiac LV ejection fraction 10-20%      AICD (automatic cardioverter/defibrillator) present      Cardiac LV ejection fraction 10-20%          FAMILY HISTORY:  FH: lung cancer        Home Medications:  acetaminophen 325 mg oral tablet: 2 tab(s) orally every 6 hours, As needed, Temp greater or equal to 38C (100.4F), Mild Pain (1 - 3) (23 Dec 2022 10:07)  apixaban 5 mg oral tablet: 1 tab(s) orally every 12 hours (23 Dec 2022 10:07)  Aquaphor Healing topical ointment: Apply topically to affected area once a day (23 Dec 2022 10:07)  ascorbic acid 500 mg oral tablet: 1 tab(s) orally once a day (23 Dec 2022 10:07)  Bacid (LAC) oral capsule: 2 cap(s) orally once a day (23 Dec 2022 10:07)  bumetanide 2 mg oral tablet: 1 tab(s) orally 2 times a day (23 Dec 2022 10:07)  gabapentin 100 mg oral capsule: 1 cap(s) orally 3 times a day (23 Dec 2022 10:07)  melatonin 3 mg oral tablet: 1 tab(s) orally once a day (at bedtime), As needed, Insomnia (23 Dec 2022 10:07)  Multiple Vitamins with Minerals oral tablet: 1 tab(s) orally once a day (23 Dec 2022 10:07)  pantoprazole 40 mg oral delayed release tablet: 1 tab(s) orally once a day (before a meal) (23 Dec 2022 10:07)  sacubitril-valsartan 24 mg-26 mg oral tablet: 1 tab(s) orally 2 times a day (23 Dec 2022 10:07)  simethicone 80 mg oral tablet: 2 tab(s) orally every 6 hours, As Needed (23 Dec 2022 10:07)  spironolactone 25 mg oral tablet: 1 tab(s) orally once a day (23 Dec 2022 10:07)  Symbicort 160 mcg-4.5 mcg/inh inhalation aerosol: 2 puff(s) inhaled 2 times a day (23 Dec 2022 10:07)  Ventolin HFA 90 mcg/inh inhalation aerosol: 2 puff(s) inhaled every 6 hours, As Needed (23 Dec 2022 10:07)      MEDICATIONS  (STANDING):  albuterol    90 MICROgram(s) HFA Inhaler 2 Puff(s) Inhalation every 6 hours  albuterol/ipratropium for Nebulization. 3 milliLiter(s) Nebulizer once  aMIOdarone    Tablet 200 milliGRAM(s) Oral daily  aspirin  chewable 81 milliGRAM(s) Oral daily  chlorhexidine 0.12% Liquid 15 milliLiter(s) Oral Mucosa every 12 hours  dextrose 5%. 1000 milliLiter(s) (100 mL/Hr) IV Continuous <Continuous>  dextrose 50% Injectable 25 Gram(s) IV Push once  dextrose 50% Injectable 12.5 Gram(s) IV Push once  dextrose 50% Injectable 25 Gram(s) IV Push once  DOBUTamine Infusion 5 MICROgram(s)/kG/Min (12.2 mL/Hr) IV Continuous <Continuous>  glucagon  Injectable 1 milliGRAM(s) IntraMuscular once  heparin   Injectable 5000 Unit(s) SubCutaneous every 8 hours  insulin lispro (ADMELOG) corrective regimen sliding scale   SubCutaneous every 6 hours  ipratropium 17 MICROgram(s) HFA Inhaler 1 Puff(s) Inhalation every 6 hours  nystatin Powder 1 Application(s) Topical two times a day  pantoprazole  Injectable 40 milliGRAM(s) IV Push daily  propofol Infusion 10 MICROgram(s)/kG/Min (4.9 mL/Hr) IV Continuous <Continuous>    MEDICATIONS  (PRN):  dextrose Oral Gel 15 Gram(s) Oral once PRN Blood Glucose LESS THAN 70 milliGRAM(s)/deciliter  glycopyrrolate Injectable 0.2 milliGRAM(s) IV Push every 6 hours PRN for secretions      Diet, NPO with Tube Feed:   Tube Feeding Modality: Nasogastric  Nepro with Carb Steady  Total Volume for 24 Hours (mL): 960  Continuous  Starting Tube Feed Rate mL per Hour: 20  Increase Tube Feed Rate by (mL): 10     Every 4 hours  Until Goal Tube Feed Rate (mL per Hour): 40  Tube Feed Duration (in Hours): 24  Tube Feed Start Time: 13:00  Free Water Flush  Pump   Rate (mL per Hour): 30 (12-26-22 @ 23:12) [Active]          Vital Signs Last 24 Hrs  T(C): 37 (15 David 2023 05:00), Max: 37 (15 David 2023 05:00)  T(F): 98.6 (15 David 2023 05:00), Max: 98.6 (15 David 2023 05:00)  HR: 88 (15 David 2023 09:00) (79 - 100)  BP: 129/81 (15 David 2023 09:00) (113/72 - 134/92)  BP(mean): 96 (15 David 2023 09:00) (85 - 103)  RR: 16 (15 David 2023 09:00) (15 - 25)  SpO2: 97% (15 David 2023 09:00) (95% - 100%)    Parameters below as of 15 David 2023 06:00  Patient On (Oxygen Delivery Method): ventilator    O2 Concentration (%): 30      01-14-23 @ 07:01  -  01-15-23 @ 07:00  --------------------------------------------------------  IN: 2120.4 mL / OUT: 1600 mL / NET: 520.4 mL        Mode: AC/ CMV (Assist Control/ Continuous Mandatory Ventilation), RR (machine): 20, TV (machine): 400, FiO2: 35, PEEP: 5, ITime: 1, MAP: 13, PIP: 30      LABS:                        10.8   11.05 )-----------( 221      ( 14 Jan 2023 06:00 )             34.5     01-14    142  |  103  |  144<H>  ----------------------------<  111<H>  4.9   |  18<L>  |  4.69<H>    Ca    8.7      14 Jan 2023 06:00  Phos  8.1     01-14  Mg     3.0     01-14                WBC:  WBC Count: 11.05 K/uL (01-14 @ 06:00)  WBC Count: 11.43 K/uL (01-13 @ 06:00)  WBC Count: 11.80 K/uL (01-12 @ 06:02)      MICROBIOLOGY:  RECENT CULTURES:                  Sodium:  Sodium, Serum: 142 mmol/L (01-14 @ 06:00)  Sodium, Serum: 142 mmol/L (01-13 @ 06:00)  Sodium, Serum: 141 mmol/L (01-12 @ 06:02)      4.69 mg/dL 01-14 @ 06:00  4.85 mg/dL 01-13 @ 06:00  4.35 mg/dL 01-12 @ 06:02      Hemoglobin:  Hemoglobin: 10.8 g/dL (01-14 @ 06:00)  Hemoglobin: 9.7 g/dL (01-13 @ 06:00)  Hemoglobin: 10.2 g/dL (01-12 @ 06:02)      Platelets: Platelet Count - Automated: 221 K/uL (01-14 @ 06:00)  Platelet Count - Automated: 219 K/uL (01-13 @ 06:00)  Platelet Count - Automated: 211 K/uL (01-12 @ 06:02)              RADIOLOGY & ADDITIONAL STUDIES:      MICROBIOLOGY:  RECENT CULTURES:

## 2023-01-15 NOTE — PROGRESS NOTE ADULT - ASSESSMENT
62M CAD, Dilated cardiomyopathy, S/p AICD, Afib/flutter, h/o substance abuse,  presented with acute hypoxic and hypercarbic respiratory failure associated with pneumothorax.      ptx  resp failure  arnoldo hx  CM  AICD  AF    eICU note reviewed -sedation issues - secretions - tachypnea -     multidisciplinary meeting held on 1/12 -   decisions to be made in the near future for discussion and GOC and care planning

## 2023-01-15 NOTE — PROVIDER CONTACT NOTE (EICU) - SITUATION
[facility]   Patient Location: [facility] ICU

## 2023-01-15 NOTE — PROGRESS NOTE ADULT - SUBJECTIVE AND OBJECTIVE BOX
Intubated in ICU    Vital Signs Last 24 Hrs  T(C): 37 (01-15-23 @ 05:00), Max: 37 (01-15-23 @ 05:00)  T(F): 98.6 (01-15-23 @ 05:00), Max: 98.6 (01-15-23 @ 05:00)  HR: 85 (01-15-23 @ 20:21) (79 - 130)  BP: 114/76 (01-15-23 @ 19:00) (114/76 - 134/92)  BP(mean): 88 (01-15-23 @ 19:00) (87 - 103)  RR: 18 (01-15-23 @ 19:00) (14 - 39)  SpO2: 96% (01-15-23 @ 20:21) (95% - 100%)    I&O's Detail    14 Jan 2023 07:01  -  15 David 2023 07:00  --------------------------------------------------------  IN:    DOBUTamine: 292.8 mL    Enteral Tube Flush: 750 mL    Nepro with Carb Steady: 960 mL    Propofol: 117.6 mL  Total IN: 2120.4 mL    OUT:    Chest Tube (mL): 0 mL    Chest Tube (mL): 0 mL    Indwelling Catheter - Urethral (mL): 1200 mL    Rectal Tube (mL): 400 mL  Total OUT: 1600 mL    Total NET: 520.4 mL    15 David 2023 07:01  -  15 David 2023 22:35  --------------------------------------------------------  IN:    DOBUTamine: 231.8 mL    Glucerna 1.5: 40 mL    Nepro with Carb Steady: 480 mL    Propofol: 166.3 mL  Total IN: 918.1 mL    OUT:    Chest Tube (mL): 0 mL    Chest Tube (mL): 0 mL    Indwelling Catheter - Urethral (mL): 1100 mL  Total OUT: 1100 mL    Total NET: -181.9 mL    Mode: AC/ CMV (Assist Control/ Continuous Mandatory Ventilation)  RR (machine): 20  TV (machine): 400  FiO2: 30  PEEP: 5  ITime: 1  MAP: 11  PIP: 26    s1s2  b/l air entry  soft, ND  + edema                        10.3   9.83  )-----------( 264      ( 15 David 2023 12:28 )             32.8     15 Jan 2023 12:35    141    |  104    |  131    ----------------------------<  124    3.9     |  24     |  4.19     Ca    9.3        15 Jan 2023 12:35  Phos  8.1       14 Jan 2023 06:00  Mg     3.0       14 Jan 2023 06:00    albuterol    90 MICROgram(s) HFA Inhaler 2 Puff(s) Inhalation every 6 hours  albuterol/ipratropium for Nebulization. 3 milliLiter(s) Nebulizer once  aMIOdarone    Tablet 200 milliGRAM(s) Oral daily  aspirin  chewable 81 milliGRAM(s) Oral daily  chlorhexidine 0.12% Liquid 15 milliLiter(s) Oral Mucosa every 12 hours  dextrose 5%. 1000 milliLiter(s) IV Continuous <Continuous>  dextrose 50% Injectable 25 Gram(s) IV Push once  dextrose 50% Injectable 12.5 Gram(s) IV Push once  dextrose 50% Injectable 25 Gram(s) IV Push once  dextrose Oral Gel 15 Gram(s) Oral once PRN  DOBUTamine Infusion 5 MICROgram(s)/kG/Min IV Continuous <Continuous>  glucagon  Injectable 1 milliGRAM(s) IntraMuscular once  glycopyrrolate Injectable 0.2 milliGRAM(s) IV Push every 6 hours PRN  heparin   Injectable 5000 Unit(s) SubCutaneous every 8 hours  insulin lispro (ADMELOG) corrective regimen sliding scale   SubCutaneous every 6 hours  ipratropium 17 MICROgram(s) HFA Inhaler 1 Puff(s) Inhalation every 6 hours  nystatin Powder 1 Application(s) Topical two times a day  pantoprazole  Injectable 40 milliGRAM(s) IV Push daily  propofol Infusion 10 MICROgram(s)/kG/Min IV Continuous <Continuous>    Impression/Plan:    DERRICK/CKD 3 (baseline creatinine 1.3-1.4)  Hemodynamic/ischemic ATN  Acute respiratory failure, s/p PEA arrest  Intubated in ICU  S/p large L tension pneumothorax  CM, chronic systolic heart failure  Lytes are stable  Good UO  Cr is improving  Continue ICU, supportive care  Avoid nephrotoxins  Overall prognosis is poor     [phone]

## 2023-01-15 NOTE — PROVIDER CONTACT NOTE (EICU) - ASSESSMENT
- reviewed CXR 1/4 and compare to 1/5- in my opinion as a non-radiologist, all lines and tubes appear to be in place relatively to prior xray. chest tubes are in place.   - discussed with bedside RN sedatives and anlagesics. reviewed with RT the ventilator settings. in my opinion the patient appeared to be 'air hungry' and was double triggering the ventilator. changed to PS 15/5 which Bruno appeared to tolerate better (for now) and was 'more comfortable appearing' per the bedside team.
On propofol 50  128/87 99%, 91, 24
Sinus tach
No recent coags but last INR on 12/24/22 was 3.12.  Recommend stat CBC, coags, type and screen for possible transfusion.  If H/H and /or INR  abnormal -- will need FFP , PRBC transfusion  If labs are not abnormal - > pt will need ENT / Pulm for bronch
malfunction of chest tube  tension pneumothorax

## 2023-01-15 NOTE — PROGRESS NOTE ADULT - SUBJECTIVE AND OBJECTIVE BOX
Patient is a 62y old  Male who presents with a chief complaint of resp failure (15 David 2023 19:17)      BRIEF HOSPITAL COURSE: ***    Events last 24 hours: ***    Review of Systems:  CONSTITUTIONAL: No fever, chills, or fatigue  EYES: No eye pain, visual disturbances, or discharge  ENMT:  No difficulty hearing, tinnitus, vertigo; No sinus or throat pain  NECK: No pain or stiffness  RESPIRATORY: No cough, wheezing, chills or hemoptysis; No shortness of breath  CARDIOVASCULAR: No chest pain, palpitations, dizziness, or leg swelling  GASTROINTESTINAL: No abdominal or epigastric pain. No nausea, vomiting, or hematemesis; No diarrhea or constipation. No melena or hematochezia.  GENITOURINARY: No dysuria, frequency, hematuria, or incontinence  NEUROLOGICAL: No headaches, memory loss, loss of strength, numbness, or tremors  SKIN: No itching, burning, rashes, or lesions   MUSCULOSKELETAL: No joint pain or swelling; No muscle, back, or extremity pain  PSYCHIATRIC: No depression, anxiety, mood swings, or difficulty sleeping    PAST MEDICAL & SURGICAL HISTORY:  Heart failure, systolic      CAD (coronary artery disease)      Hypertension      Nonischemic cardiomyopathy      COPD, moderate      2019 novel coronavirus disease (COVID-19)      Substance abuse      HLD (hyperlipidemia)      Cardiac LV ejection fraction 10-20%      AICD (automatic cardioverter/defibrillator) present      Cardiac LV ejection fraction 10-20%          Medications:    aMIOdarone    Tablet 200 milliGRAM(s) Oral daily  DOBUTamine Infusion 5 MICROgram(s)/kG/Min IV Continuous <Continuous>    albuterol    90 MICROgram(s) HFA Inhaler 2 Puff(s) Inhalation every 6 hours  albuterol/ipratropium for Nebulization. 3 milliLiter(s) Nebulizer once  ipratropium 17 MICROgram(s) HFA Inhaler 1 Puff(s) Inhalation every 6 hours    propofol Infusion 10 MICROgram(s)/kG/Min IV Continuous <Continuous>      aspirin  chewable 81 milliGRAM(s) Oral daily  heparin   Injectable 5000 Unit(s) SubCutaneous every 8 hours    glycopyrrolate Injectable 0.2 milliGRAM(s) IV Push every 6 hours PRN  pantoprazole  Injectable 40 milliGRAM(s) IV Push daily      dextrose 50% Injectable 25 Gram(s) IV Push once  dextrose 50% Injectable 12.5 Gram(s) IV Push once  dextrose 50% Injectable 25 Gram(s) IV Push once  dextrose Oral Gel 15 Gram(s) Oral once PRN  glucagon  Injectable 1 milliGRAM(s) IntraMuscular once  insulin lispro (ADMELOG) corrective regimen sliding scale   SubCutaneous every 6 hours    dextrose 5%. 1000 milliLiter(s) IV Continuous <Continuous>      chlorhexidine 0.12% Liquid 15 milliLiter(s) Oral Mucosa every 12 hours  nystatin Powder 1 Application(s) Topical two times a day        Mode: AC/ CMV (Assist Control/ Continuous Mandatory Ventilation)  RR (machine): 20  TV (machine): 400  FiO2: 35  PEEP: 5  ITime: 1  MAP: 11  PIP: 25      ICU Vital Signs Last 24 Hrs  T(C): 37 (15 David 2023 05:00), Max: 37 (15 David 2023 05:00)  T(F): 98.6 (15 David 2023 05:00), Max: 98.6 (15 David 2023 05:00)  HR: 111 (15 David 2023 19:00) (79 - 130)  BP: 114/76 (15 David 2023 19:00) (114/76 - 134/92)  BP(mean): 88 (15 David 2023 19:00) (87 - 103)  ABP: --  ABP(mean): --  RR: 18 (15 David 2023 19:00) (14 - 39)  SpO2: 97% (15 David 2023 19:00) (95% - 100%)    O2 Parameters below as of 15 David 2023 19:00  Patient On (Oxygen Delivery Method): ventilator    O2 Concentration (%): 35            I&O's Detail    14 Jan 2023 07:01  -  15 David 2023 07:00  --------------------------------------------------------  IN:    DOBUTamine: 292.8 mL    Enteral Tube Flush: 750 mL    Nepro with Carb Steady: 960 mL    Propofol: 117.6 mL  Total IN: 2120.4 mL    OUT:    Chest Tube (mL): 0 mL    Chest Tube (mL): 0 mL    Indwelling Catheter - Urethral (mL): 1200 mL    Rectal Tube (mL): 400 mL  Total OUT: 1600 mL    Total NET: 520.4 mL      15 David 2023 07:01  -  15 David 2023 19:56  --------------------------------------------------------  IN:    DOBUTamine: 231.8 mL    Glucerna 1.5: 40 mL    Nepro with Carb Steady: 480 mL    Propofol: 166.3 mL  Total IN: 918.1 mL    OUT:    Chest Tube (mL): 0 mL    Chest Tube (mL): 0 mL    Indwelling Catheter - Urethral (mL): 1100 mL  Total OUT: 1100 mL    Total NET: -181.9 mL          LABS:                        10.3   9.83  )-----------( 264      ( 15 David 2023 12:28 )             32.8     01-15    141  |  104  |  131<H>  ----------------------------<  124<H>  3.9   |  24  |  4.19<H>    Ca    9.3      15 David 2023 12:35  Phos  8.1     01-14  Mg     3.0     01-14            CAPILLARY BLOOD GLUCOSE      POCT Blood Glucose.: 112 mg/dL (15 David 2023 18:03)        CULTURES:      Physical Examination:  General: No acute distress.    HEENT: Pupils equal, reactive to light.  Symmetric.  PULM: Clear to auscultation bilaterally, no significant sputum production  NECK: Supple, no lymphadenopathy, trachea midline  CVS: Regular rate and rhythm, no murmurs, rubs, or gallops  ABD: Soft, nondistended, nontender, normoactive bowel sounds, no masses  EXT: No edema, nontender  SKIN: Warm and well perfused, no rashes noted.  NEURO: Alert, oriented, interactive, nonfocal  DEVICES:     RADIOLOGY: ***    CRITICAL CARE TIME SPENT: ** minutes assessing presenting problems of acute illness, which pose high probability of life threatening deterioration or end organ damage/dysfunction, as well as medical decision making including initiating plan of care, reviewing data, reviewing radiologic exams, discussing with multidisciplinary team,  discussing goals of care with patient/family, and writing this note.  Non-inclusive of procedures performed,   Patient is a 62y old  Male who presents with a chief complaint of resp failure (15 David 2023 19:17)      BRIEF HOSPITAL COURSE-  63 y/o Male with PMH of HTN, Dilated CM (s/p AICD), CKD, Opioid/ Substance Abuse, AFib/Flutter presents to  ED complaining of shortness of breath. Admitted with acute hypoxic respiratory failure requiring intubation. Course complicated by L PTX s/p intubation and PEA arrest with chest tube emergently placed by ED and subsequent ROSC. Transferred to [facility] with course further complicated with Aspiration PNA, Shock, and DERRICK on CKD. Ethics had GOC with family, decided on no escalation of current care.    Events last 24 hours:   Remains on full ventilatory support. No escalation of care per multidisciplinary meeting on 1/12, continue with supportive measures. Patient with acute episode of tachycardia and tachypnea, however receiving adequate TV and maintaining saturation. Likely secondary to sedation, administered 50 Fentanyl with significant improvement.       Review of Systems:  CONSTITUTIONAL: No fever, chills, or fatigue.  EYES: No eye pain, visual disturbances, or discharge.  ENMT:  No difficulty hearing, tinnitus, vertigo. No sinus or throat pain.  NECK: No pain or stiffness.  RESPIRATORY: No cough, wheezing, chills or hemoptysis. No shortness of breath.  CARDIOVASCULAR: No chest pain, palpitations, dizziness, or leg swelling.  GASTROINTESTINAL: No abdominal or epigastric pain. No nausea, vomiting, or hematemesis. No diarrhea or constipation. No melena or hematochezia.  GENITOURINARY: No dysuria, frequency, hematuria, or incontinence.  NEUROLOGICAL: No headaches, memory loss, loss of strength, numbness, or tremors.  SKIN: No itching, burning, rashes, or lesions.  MUSCULOSKELETAL: No joint pain or swelling. No muscle, back, or extremity pain.  PSYCHIATRIC: No depression, anxiety, mood swings, or difficulty sleeping.    [X] Unable to obtain due to patients current mental status/ medical condition.       PAST MEDICAL & SURGICAL HISTORY-  Heart failure, systolic      CAD (coronary artery disease)      Hypertension      Nonischemic cardiomyopathy      COPD, moderate      2019 novel coronavirus disease (COVID-19)      Substance abuse      HLD (hyperlipidemia)      Cardiac LV ejection fraction 10-20%      AICD (automatic cardioverter/defibrillator) present      Cardiac LV ejection fraction 10-20%          Medications:    aMIOdarone    Tablet 200 milliGRAM(s) Oral daily  DOBUTamine Infusion 5 MICROgram(s)/kG/Min IV Continuous <Continuous>    albuterol    90 MICROgram(s) HFA Inhaler 2 Puff(s) Inhalation every 6 hours  albuterol/ipratropium for Nebulization. 3 milliLiter(s) Nebulizer once  ipratropium 17 MICROgram(s) HFA Inhaler 1 Puff(s) Inhalation every 6 hours    propofol Infusion 10 MICROgram(s)/kG/Min IV Continuous <Continuous>      aspirin  chewable 81 milliGRAM(s) Oral daily  heparin   Injectable 5000 Unit(s) SubCutaneous every 8 hours    glycopyrrolate Injectable 0.2 milliGRAM(s) IV Push every 6 hours PRN  pantoprazole  Injectable 40 milliGRAM(s) IV Push daily      dextrose 50% Injectable 25 Gram(s) IV Push once  dextrose 50% Injectable 12.5 Gram(s) IV Push once  dextrose 50% Injectable 25 Gram(s) IV Push once  dextrose Oral Gel 15 Gram(s) Oral once PRN  glucagon  Injectable 1 milliGRAM(s) IntraMuscular once  insulin lispro (ADMELOG) corrective regimen sliding scale   SubCutaneous every 6 hours    dextrose 5%. 1000 milliLiter(s) IV Continuous <Continuous>      chlorhexidine 0.12% Liquid 15 milliLiter(s) Oral Mucosa every 12 hours  nystatin Powder 1 Application(s) Topical two times a day        Mode: AC/ CMV (Assist Control/ Continuous Mandatory Ventilation)  RR (machine): 20  TV (machine): 400  FiO2: 35  PEEP: 5  ITime: 1  MAP: 11  PIP: 25        ICU Vital Signs Last 24 Hrs  T(C): 37 (15 David 2023 05:00), Max: 37 (15 David 2023 05:00)  T(F): 98.6 (15 David 2023 05:00), Max: 98.6 (15 David 2023 05:00)  HR: 111 (15 David 2023 19:00) (79 - 130)  BP: 114/76 (15 David 2023 19:00) (114/76 - 134/92)  BP(mean): 88 (15 David 2023 19:00) (87 - 103)  ABP: --  ABP(mean): --  RR: 18 (15 David 2023 19:00) (14 - 39)  SpO2: 97% (15 David 2023 19:00) (95% - 100%)    O2 Parameters below as of 15 David 2023 19:00  Patient On (Oxygen Delivery Method): ventilator    O2 Concentration (%): 35          I&O's Detail    14 Jan 2023 07:01  -  15 David 2023 07:00  --------------------------------------------------------  IN:    DOBUTamine: 292.8 mL    Enteral Tube Flush: 750 mL    Nepro with Carb Steady: 960 mL    Propofol: 117.6 mL  Total IN: 2120.4 mL    OUT:    Chest Tube (mL): 0 mL    Chest Tube (mL): 0 mL    Indwelling Catheter - Urethral (mL): 1200 mL    Rectal Tube (mL): 400 mL  Total OUT: 1600 mL    Total NET: 520.4 mL      15 David 2023 07:01  -  15 David 2023 19:56  --------------------------------------------------------  IN:    DOBUTamine: 231.8 mL    Glucerna 1.5: 40 mL    Nepro with Carb Steady: 480 mL    Propofol: 166.3 mL  Total IN: 918.1 mL    OUT:    Chest Tube (mL): 0 mL    Chest Tube (mL): 0 mL    Indwelling Catheter - Urethral (mL): 1100 mL  Total OUT: 1100 mL    Total NET: -181.9 mL          LABS:                        10.3   9.83  )-----------( 264      ( 15 David 2023 12:28 )             32.8     01-15    141  |  104  |  131<H>  ----------------------------<  124<H>  3.9   |  24  |  4.19<H>    Ca    9.3      15 David 2023 12:35  Phos  8.1     01-14  Mg     3.0     01-14            CAPILLARY BLOOD GLUCOSE  POCT Blood Glucose.: 112 mg/dL (15 David 2023 18:03)        CULTURES:        Physical Examination:  General: Elderly male, cathectic, in no acute distress. Intubated and sedated.   HEENT: Pupils equal, reactive to light. Symmetric.  PULM: Clear to auscultation bilaterally, no adventitious sounds. No significant sputum production.  NECK: Supple, no lymphadenopathy, trachea midline.  CVS: Regular rate and rhythm. No murmurs, rubs, or gallops. S1, S2 intact.   ABD: Soft, nondistended, nontender, normoactive bowel sounds. No masses palpable.   EXT: No edema, nontender.  SKIN: Warm and well perfused, no rashes noted.  NEURO: Intubated and sedated,   DEVICES:     RADIOLOGY-    < from: CT Head No Cont (12.23.22 @ 21:18) >  ACC: [id number] EXAM:  CT BRAIN                          PROCEDURE DATE:  12/23/2022      INTERPRETATION:  CT HEAD    INDICATIONS: ro ic bleed, Admitting Dxs: J96.01 RESP DISTRESS, SCT    TECHNIQUE:  Serial axial images were obtained from the skull base to the vertex   without the use of intravenous contrast.    COMPARISON EXAMINATION: 11/23/2016    FINDINGS:  Brain parenchyma: Mild progression of bilateral periventricular and deep   white matter hypoattenuation. There is no mass effect or intracranial   hemorrhage.    Extra-axial compartments: No extra-axial fluid collections are present.   The ventricles and sulci are normal in size and configuration for   patient's stated age. The basal cisterns are patent. Craniocervical   junction and sella turcica are within normal limits.    Calvarium, paranasal sinuses, and orbits: The calvarium is intact.   Air-fluid levels present in the pneumatized portion of the right   pterygoid recess. The mastoid air cells are clear. The orbits are within   normal limits.    IMPRESSION:  No large territory acute infarct, intracranial hemorrhage, or mass effect.    Slight progression of chronic microangiopathic white matter changes as   compared to prior study from 2016.    --- End of Report ---       ROBBIE HOLDER MD; Attending Radiologist  This document has been electronically signed. Dec 24 2022  4:28AM    < end of copied text >        < from: CT Chest No Cont (12.23.22 @ 21:19) >  ACC: [id number] EXAM:  CT ABDOMEN AND PELVIS                        ACC: [id number] EXAM:  CT CHEST                          PROCEDURE DATE:  12/23/2022      INTERPRETATION:  CLINICAL INFORMATION: Follow-up pneumothorax, elevated   LFTs, respiratory distress, acute kidney injury    COMPARISON: CT chest/abdomen/pelvis 11/22/2021    CONTRAST/COMPLICATIONS:  IV Contrast: NONE  Oral Contrast: NONE  Complications: None reported at time of study completion    PROCEDURE:  CT of the Chest, Abdomen and Pelvis was performed.  Sagittal and coronal reformats were performed.    FINDINGS:  CHEST:  LUNGS AND LARGE AIRWAYS: The tip of the endotracheal tube is above the   jennie. The central airways are patent. Background emphysema. Right   basilar rounded atelectasis again noted.  PLEURA: Decreased small loculated right pleural effusion. Left chest tube   with residual small left pneumothorax.  VESSELS: Normal caliber aorta.  HEART: Severe cardiomegaly. No pericardial effusion. Coronary artery   calcifications are present.  MEDIASTINUM AND AIDA: No adenopathy. Small pneumomediastinum.  CHEST WALL AND LOWER NECK: Left chest wall emphysema.    ABDOMEN AND PELVIS:  LIVER: Normal.  BILE DUCTS: Nondilated.  GALLBLADDER: Gallstones.  SPLEEN: Normal.  PANCREAS: Diffuse atrophy.  ADRENALS: Normal.  KIDNEYS/URETERS: No hydronephrosis or urinary tract calculi.    BLADDER: Diffuse thickening and stranding despite collapsed around a   Bailey catheter.  REPRODUCTIVE ORGANS: Nonenlarged.    BOWEL: No bowel-related abnormality. Specifically, no bowel obstruction.   The NG tube is in the stomach.  PERITONEUM: Trace ascites. No free air.  VESSELS: Normal caliber aorta.  RETROPERITONEUM/LYMPH NODES: No adenopathy. Moderate   pneumoretroperitoneum and properitoneal air.  ABDOMINAL WALL: Left-sided abdominal wall emphysema.  BONES: No aggressive lesion.    IMPRESSION:  *  Left chest tube with residual small left pneumothorax. No evidence of   tension.  *  Right basilar rounded atelectasis again noted with chronic small   loculated right pleural effusion.  *  Small pneumomediastinum. Left chest and abdominal wall emphysema.   Moderate pneumoretroperitoneum and properitoneal air. No   pneumoperitoneum. Findings are compatible with barotrauma.  *  Evidence of urinary bladder cystitis.      --- End of Report ---      KURT SHAH MD; Attending Radiologist  This document has been electronically signed. Dec 24 2022  6:57PM    < end of copied text >      < from: CT Head No Cont (12.29.22 @ 13:20) >  ACC: [id number] EXAM:  CT BRAIN                          PROCEDURE DATE:  12/29/2022      INTERPRETATION:  .    CLINICAL INFORMATION: Anoxic encephalopathy.    TECHNIQUE: Multiple axial CT images of the head were obtained without   contrast. Sagittal and coronal reconstructed images were acquired from   the source data.    COMPARISON: Prior CT examination of the head performed on 12/23/2022.    FINDINGS: There is no acute intracranial hemorrhage, mass effect, shift   of the midline structures, herniation, extra-axial fluid collection, or   hydrocephalus.    There is diffuse cerebral volume loss with prominence of the sulci,   fissures, and cisternal spaces which is normal for the patient's age.   There is mild deep and periventricular white matter hypoattenuation   statistically compatible with microvascular changes given calcific   atherosclerotic disease of the intracranial arteries.    The paranasal sinuses and mastoid air cells are clear. The calvarium is   intact. The imaged orbits are unremarkable.    IMPRESSION: No acute intracranial hemorrhage, mass effect, or shift of   the midline structures.    Similar-appearing mild chronic white matter microvascular type changes.    --- End of Report ---      EDEN GERBER MD; Attending Radiologist  This document has been electronically signed. Dec 29 2022  2:05PM    < end of copied text >      < from: CT Chest No Cont (12.29.22 @ 13:20) >  ACC: [id number] EXAM:  CT CHEST                          PROCEDURE DATE:  12/29/2022      INTERPRETATION:  HISTORY: Admitting Dxs: J96.01 RESP DISTRESS    EXAMINATION: CT CHEST was performed without IV contrast.    COMPARISON: 12/3/2022.    FINDINGS:    AIRWAYS, LUNGS, PLEURA: Satisfactory positioning of endotracheal tube.   Mild central airways secretions. Unchanged small loculated left   pneumothorax. Small chronic loculated right pleural effusion unchanged.   Right basilar and right middlelobe atelectasis. Emphysema.    Left chest tube in place with distal tip along the medial and upper   pleural space.    MEDIASTINUM: Cardiomegaly. No pericardial effusion. Thoracic aorta normal   caliber.  No large mediastinal lymph nodes. ICD lead terminates in the   right ventricle.    IMAGED ABDOMEN: Enteric catheter terminates in the stomach.   Cholelithiasis. Decreased abdominal extraluminal gas.    SOFT TISSUES: Decreased subcutaneous soft tissue emphysema.    BONES: Unremarkable.      IMPRESSION:.    Unchanged small loculated left pneumothorax.    Unchanged small loculated and chronic right pleural effusion.    Decreased subcutaneous soft tissue emphysema and extraluminal abdominal   gas.    --- End of Report ---       MARILYN WEST MD; Attending Radiologist  This document has been electronically signed. Dec 29 2022  1:57PM    < end of copied text >      < from: Xray Chest 1 View- PORTABLE-Routine (Xray Chest 1 View- PORTABLE-Routine in AM.) (01.08.23 @ 07:28) >  ACC: [id number] EXAM:  XR CHEST PORTABLE ROUTINE 1V                          PROCEDURE DATE:  01/08/2023      INTERPRETATION:  INDICATION: Respiratory distress.    COMPARISON: 1/6/23    Technique: AP radiograph of the chest    FINDINGS:  ET tube tip above jennie.  Enteric tube courses below diaphragm -tip is off the film.  Left chest tube overlies left apex.  Left-sided AICD with single electrode overlying right ventricle.  Heart/Vascular: Stable cardiomegaly.  Pulmonary: Mild decrease in right pleural effusion. Persistent right   basilar atelectasis and/or airspace consolidation. Grossly clear left   lung. No pneumothorax.  Bones: No acute bony finding.    Impression:  Catheters and tubes in place.  Mild decrease in right pleural effusion.      --- End of Report ---    ARI WONG MD; Attending Radiologist  This document has been electronically signed. Jan 9 2023  4:56PM    < end of copied text >      CRITICAL CARE TIME SPENT: 60 minutes assessing presenting problems of acute illness, which pose high probability of life threatening deterioration or end organ damage/dysfunction, as well as medical decision making including initiating plan of care, reviewing data, reviewing radiologic exams, discussing with multidisciplinary team,  discussing goals of care with patient/family, and writing this note.  Non-inclusive of procedures performed,

## 2023-01-15 NOTE — PROGRESS NOTE ADULT - SUBJECTIVE AND OBJECTIVE BOX
Date/Time Patient Seen:  		  Referring MD:   Data Reviewed	       Patient is a 62y old  Male who presents with a chief complaint of resp failure (15 David 2023 14:20)      Subjective/HPI     PAST MEDICAL & SURGICAL HISTORY:  Heart failure, systolic    CAD (coronary artery disease)    Hypertension    Nonischemic cardiomyopathy    COPD, moderate    2019 novel coronavirus disease (COVID-19)    Substance abuse    HLD (hyperlipidemia)    Cardiac LV ejection fraction 10-20%    No significant past surgical history    AICD (automatic cardioverter/defibrillator) present    Cardiac LV ejection fraction 10-20%          Medication list         MEDICATIONS  (STANDING):  albuterol    90 MICROgram(s) HFA Inhaler 2 Puff(s) Inhalation every 6 hours  albuterol/ipratropium for Nebulization. 3 milliLiter(s) Nebulizer once  aMIOdarone    Tablet 200 milliGRAM(s) Oral daily  aspirin  chewable 81 milliGRAM(s) Oral daily  chlorhexidine 0.12% Liquid 15 milliLiter(s) Oral Mucosa every 12 hours  dextrose 5%. 1000 milliLiter(s) (100 mL/Hr) IV Continuous <Continuous>  dextrose 50% Injectable 25 Gram(s) IV Push once  dextrose 50% Injectable 12.5 Gram(s) IV Push once  dextrose 50% Injectable 25 Gram(s) IV Push once  DOBUTamine Infusion 5 MICROgram(s)/kG/Min (12.2 mL/Hr) IV Continuous <Continuous>  glucagon  Injectable 1 milliGRAM(s) IntraMuscular once  heparin   Injectable 5000 Unit(s) SubCutaneous every 8 hours  insulin lispro (ADMELOG) corrective regimen sliding scale   SubCutaneous every 6 hours  ipratropium 17 MICROgram(s) HFA Inhaler 1 Puff(s) Inhalation every 6 hours  nystatin Powder 1 Application(s) Topical two times a day  pantoprazole  Injectable 40 milliGRAM(s) IV Push daily  propofol Infusion 10 MICROgram(s)/kG/Min (4.9 mL/Hr) IV Continuous <Continuous>    MEDICATIONS  (PRN):  dextrose Oral Gel 15 Gram(s) Oral once PRN Blood Glucose LESS THAN 70 milliGRAM(s)/deciliter  glycopyrrolate Injectable 0.2 milliGRAM(s) IV Push every 6 hours PRN for secretions         Vitals log        ICU Vital Signs Last 24 Hrs  T(C): 37 (15 David 2023 05:00), Max: 37 (15 David 2023 05:00)  T(F): 98.6 (15 David 2023 05:00), Max: 98.6 (15 David 2023 05:00)  HR: 111 (15 David 2023 19:00) (79 - 130)  BP: 114/76 (15 David 2023 19:00) (114/76 - 134/92)  BP(mean): 88 (15 David 2023 19:00) (87 - 103)  ABP: --  ABP(mean): --  RR: 18 (15 David 2023 19:00) (14 - 39)  SpO2: 97% (15 David 2023 19:00) (95% - 100%)    O2 Parameters below as of 15 David 2023 19:00  Patient On (Oxygen Delivery Method): ventilator    O2 Concentration (%): 35         Mode: AC/ CMV (Assist Control/ Continuous Mandatory Ventilation)  RR (machine): 20  TV (machine): 400  FiO2: 35  PEEP: 5  ITime: 1  MAP: 11  PIP: 25      Input and Output:  I&O's Detail    14 Jan 2023 07:01  -  15 David 2023 07:00  --------------------------------------------------------  IN:    DOBUTamine: 292.8 mL    Enteral Tube Flush: 750 mL    Nepro with Carb Steady: 960 mL    Propofol: 117.6 mL  Total IN: 2120.4 mL    OUT:    Chest Tube (mL): 0 mL    Chest Tube (mL): 0 mL    Indwelling Catheter - Urethral (mL): 1200 mL    Rectal Tube (mL): 400 mL  Total OUT: 1600 mL    Total NET: 520.4 mL      15 David 2023 07:01  -  15 David 2023 19:17  --------------------------------------------------------  IN:    DOBUTamine: 170.8 mL    Glucerna 1.5: 40 mL    Nepro with Carb Steady: 440 mL    Propofol: 83.2 mL  Total IN: 734 mL    OUT:    Chest Tube (mL): 0 mL    Chest Tube (mL): 0 mL    Indwelling Catheter - Urethral (mL): 1100 mL  Total OUT: 1100 mL    Total NET: -366 mL          Lab Data                        10.3   9.83  )-----------( 264      ( 15 David 2023 12:28 )             32.8     01-15    141  |  104  |  131<H>  ----------------------------<  124<H>  3.9   |  24  |  4.19<H>    Ca    9.3      15 David 2023 12:35  Phos  8.1     01-14  Mg     3.0     01-14              Review of Systems	      Objective     Physical Examination    heart s1s2  lung dec BS      Pertinent Lab findings & Imaging      Leo:  NO   Adequate UO     I&O's Detail    14 Jan 2023 07:01  -  15 David 2023 07:00  --------------------------------------------------------  IN:    DOBUTamine: 292.8 mL    Enteral Tube Flush: 750 mL    Nepro with Carb Steady: 960 mL    Propofol: 117.6 mL  Total IN: 2120.4 mL    OUT:    Chest Tube (mL): 0 mL    Chest Tube (mL): 0 mL    Indwelling Catheter - Urethral (mL): 1200 mL    Rectal Tube (mL): 400 mL  Total OUT: 1600 mL    Total NET: 520.4 mL      15 David 2023 07:01  -  15 David 2023 19:17  --------------------------------------------------------  IN:    DOBUTamine: 170.8 mL    Glucerna 1.5: 40 mL    Nepro with Carb Steady: 440 mL    Propofol: 83.2 mL  Total IN: 734 mL    OUT:    Chest Tube (mL): 0 mL    Chest Tube (mL): 0 mL    Indwelling Catheter - Urethral (mL): 1100 mL  Total OUT: 1100 mL    Total NET: -366 mL               Discussed with:     Cultures:	        Radiology

## 2023-01-15 NOTE — PROGRESS NOTE ADULT - SUBJECTIVE AND OBJECTIVE BOX
DOROTHY VOSS is a 62yMale , patient examined and chart reviewed.     INTERVAL HPI/ OVERNIGHT EVENTS:   Afebrile. Remains Vented sedated.  No events.    PAST MEDICAL & SURGICAL HISTORY:  Heart failure, systolic  CAD (coronary artery disease)  Hypertension  Nonischemic cardiomyopathy  COPD, moderate  2019 novel coronavirus disease (COVID-19)  Substance abuse  HLD (hyperlipidemia)  Cardiac LV ejection fraction 10-20%  AICD (automatic cardioverter/defibrillator) present  Cardiac LV ejection fraction 10-20%        For details regarding the patient's social history, family history, and other miscellaneous elements, please refer the initial infectious diseases consultation and/or the admitting history and physical examination for this admission.    ROS:  Unable to obtain due to : pt's condition    ALLERGIES  pork (Other)      Current inpatient medications :    ANTIBIOTICS/RELEVANT:    MEDICATIONS  (STANDING):  albuterol    90 MICROgram(s) HFA Inhaler 2 Puff(s) Inhalation every 6 hours  albuterol/ipratropium for Nebulization. 3 milliLiter(s) Nebulizer once  aMIOdarone    Tablet 200 milliGRAM(s) Oral daily  aspirin  chewable 81 milliGRAM(s) Oral daily  chlorhexidine 0.12% Liquid 15 milliLiter(s) Oral Mucosa every 12 hours  dextrose 5%. 1000 milliLiter(s) (100 mL/Hr) IV Continuous <Continuous>  dextrose 50% Injectable 25 Gram(s) IV Push once  dextrose 50% Injectable 12.5 Gram(s) IV Push once  dextrose 50% Injectable 25 Gram(s) IV Push once  DOBUTamine Infusion 5 MICROgram(s)/kG/Min (12.2 mL/Hr) IV Continuous <Continuous>  glucagon  Injectable 1 milliGRAM(s) IntraMuscular once  heparin   Injectable 5000 Unit(s) SubCutaneous every 8 hours  insulin lispro (ADMELOG) corrective regimen sliding scale   SubCutaneous every 6 hours  ipratropium 17 MICROgram(s) HFA Inhaler 1 Puff(s) Inhalation every 6 hours  nystatin Powder 1 Application(s) Topical two times a day  pantoprazole  Injectable 40 milliGRAM(s) IV Push daily  propofol Infusion 10 MICROgram(s)/kG/Min (4.9 mL/Hr) IV Continuous <Continuous>    MEDICATIONS  (PRN):  dextrose Oral Gel 15 Gram(s) Oral once PRN Blood Glucose LESS THAN 70 milliGRAM(s)/deciliter  glycopyrrolate Injectable 0.2 milliGRAM(s) IV Push every 6 hours PRN for secretions    Objective:  Vital Signs Last 24 Hrs  T(C): 37 (15 David 2023 05:00), Max: 37 (15 David 2023 05:00)  T(F): 98.6 (15 David 2023 05:00), Max: 98.6 (15 David 2023 05:00)  HR: 111 (15 David 2023 19:00) (79 - 130)  BP: 114/76 (15 David 2023 19:00) (114/76 - 134/92)  BP(mean): 88 (15 David 2023 19:00) (87 - 103)  RR: 18 (15 David 2023 19:00) (14 - 39)  SpO2: 97% (15 David 2023 19:00) (95% - 100%)    Parameters below as of 15 David 2023 19:00  Patient On (Oxygen Delivery Method): ventilator    O2 Concentration (%): 35  Mode: AC/ CMV (Assist Control/ Continuous Mandatory Ventilation), RR (machine): 20, TV (machine): 400, FiO2: 35, PEEP: 5, ITime: 1, MAP: 10, PIP: 15    Physical Exam:  General: vented sedated +NGT  Neck: supple, trachea midline  Lungs: decreased no wheeze/rhonchi Left chest tubes x 2  Cardiovascular: regular rate and rhythm, S1 S2  Abdomen: soft, nontender,  bowel sounds normal  Neurological: sedated  Skin: no rash  Extremities: +edema        LABS:                        10.3   9.83  )-----------( 264      ( 15 David 2023 12:28 )             32.8   01-15    141  |  104  |  131<H>  ----------------------------<  124<H>  3.9   |  24  |  4.19<H>    Ca    9.3      15 David 2023 12:35  Phos  8.1     01-14  Mg     3.0     01-14      MICROBIOLOGY:  Culture - Sputum . (12.24.22 @ 17:43)    Gram Stain:   Numerous polymorphonuclear leukocytes per low power field  No Squamous epithelial cells per low power field  Rare Gram Positive Rods seen per oil power field    -  Amikacin: S <=16    -  Aztreonam: S 8    -  Cefepime: S <=2    -  Ceftazidime: S 4    -  Ciprofloxacin: S <=0.25    -  Gentamicin: S 4    -  Imipenem: S <=1    -  Levofloxacin: S <=0.5    -  Meropenem: S <=1    -  Piperacillin/Tazobactam: S <=8    -  Tobramycin: S <=2    Specimen Source: ET Tube ET Tube    Culture Results:   Rare Pseudomonas aeruginosa  Normal Respiratory Cathy absent    Organism Identification: Pseudomonas aeruginosa    Organism: Pseudomonas aeruginosa    Method Type: BAY    Culture - Urine (12.23.22 @ 11:16)    -  Tobramycin: S <=2    -  Amoxicillin/Clavulanic Acid: S <=8/4    -  Ampicillin: R 16 These ampicillin results predict results for amoxicillin    -  Ampicillin/Sulbactam: S <=4/2 Enterobacter, Klebsiella aerogenes, Citrobacter, and Serratia may develop resistance during prolonged therapy (3-4 days)    -  Amikacin: S <=16    -  Cefazolin: S <=2    -  Cefoxitin: S <=8    -  Aztreonam: S <=4    -  Cefepime: S <=2    -  Imipenem: S <=1    -  Levofloxacin: S <=0.5    -  Ceftriaxone: S <=1 Enterobacter, Klebsiella aerogenes, Citrobacter, and Serratia may develop resistance during prolonged therapy    -  Ciprofloxacin: S <=0.25    -  Trimethoprim/Sulfamethoxazole: S <=0.5/9.5    -  Meropenem: S <=1    -  Nitrofurantoin: S <=32 Should not be used to treat pyelonephritis    -  Ertapenem: S <=0.5    -  Gentamicin: S <=2    -  Piperacillin/Tazobactam: S <=8    Specimen Source: Clean Catch Clean Catch (Midstream)    Culture Results:   >100,000 CFU/ml Klebsiella oxytoca/Raoultella ornithinolytica    Organism Identification: Klebsiella oxytoca /Raoutella ornithinolytica    Organism: Klebsiella oxytoca /Raoutella ornithinolytica    Method Type: BAY      Culture - Blood (12.23.22 @ 14:06)    Specimen Source: .Blood Blood-Peripheral    Culture Results:   No Growth Final      RADIOLOGY & ADDITIONAL STUDIES:    ACC: [id number] EXAM:  CT CHEST                          PROCEDURE DATE:  12/29/2022          INTERPRETATION:  HISTORY: Admitting Dxs: J96.01 RESP DISTRESS    EXAMINATION: CT CHEST was performed without IV contrast.    COMPARISON: 12/3/2022.    FINDINGS:    AIRWAYS, LUNGS, PLEURA: Satisfactory positioning of endotracheal tube.   Mild central airways secretions. Unchanged small loculated left   pneumothorax. Small chronic loculated right pleural effusion unchanged.   Right basilar and right middlelobe atelectasis. Emphysema.    Left chest tube in place with distal tip along the medial and upper   pleural space.    MEDIASTINUM: Cardiomegaly. No pericardial effusion. Thoracic aorta normal   caliber.  No large mediastinal lymph nodes. ICD lead terminates in the   right ventricle.    IMAGED ABDOMEN: Enteric catheter terminates in the stomach.   Cholelithiasis. Decreased abdominal extraluminal gas.    SOFT TISSUES: Decreased subcutaneous soft tissue emphysema.    BONES: Unremarkable.      IMPRESSION:.    Unchanged small loculated left pneumothorax.    Unchanged small loculated and chronic right pleural effusion.    Decreased subcutaneous soft tissue emphysema and extraluminal abdominal   gas.      ACC: [id number] EXAM:  XR CHEST PORTABLE IMMED 1V                        ACC: [id number] EXAM:  XR CHEST PORTABLE URGENT 1V                        ACC: [id number] EXAM:  XR CHEST PORTABLE URGENT 1V                        ACC: [id number] EXAM:  XR CHEST PORTABLE IMMED 1V                          PROCEDURE DATE:  01/03/2023          INTERPRETATION:  TIME OF EXAM: January 2, 2023 at 11:46 PM and 11:47 PM.    CLINICAL INFORMATION: Status post cardiac arrest. Respiratory distress   and abnormal chest sounds.    COMPARISON:  CT scan of the chest from December 29, 2022.    TECHNIQUE:   AP Portable chest x-ray.    INTERPRETATION:    The cardiac silhouette is enlarged. There is a left chest wall ICD with   intact lead with tip in expected region of the right ventricle. The   generator obscures part of the left lung.  The mediastinum is not accurately evaluated on these images.  ET tube tip is approximately 7.4 cm above the jennie.  Enteric tube tip is near the GE junction with side port in the distal   esophagus.  Left chest tube not significantly changed in position.  No significant change in small loculated right pleural effusion and right   mid and lower lung opacities. Question small pneumothorax associated with   the pleural effusion versus interposed aerated lung. Question tiny right   apical pneumothorax versus apical bulla.  Large left pneumothorax, increased in size. Concern for tension as there   is inversion of the left hemidiaphragm and widening of the rib   interspaces on the left compared to the right. Atelectasis of underlying   lung.  No acute bony abnormality.    AP portable chest x-ray from January 3, 2023 at 12:05 AM and 12:06 AM:    CLINICAL INFORMATION: Pneumothorax.    INTERPRETATION:    ET tube tip is 5.7 cm above the jennie. Enteric tube and left chest tube   unchanged in position.  Large left pneumothorax with concerns for tension, not significantly   changed.  Right-sided findings not significantly changed.    AP portable chest x-ray from January 3, 2023 at 12:53 AM and 12:54 AM:    CLINICAL INFORMATION: Replaced left chest tube.    INTERPRETATION:    ET tube tip is 6.2 cm above the jennie.  Enteric tube tip near GE junction with side port in the distal esophagus.  Left chest tube with tip projecting over the aortic knob.  Large left pneumothorax with concerns for tension, not significantly   changed. Continued atelectasis of underlying lung.  New left subcutaneous emphysema.  Small loculated right pleural effusion with likely associated passive   atelectasis is not significantly changed. Once again a small pneumothorax   component is not excluded. In addition, continued question of minimal   right apical pneumothorax versus apical bulla.    AP portable chest x-ray from January 3, 2023 at 1:35 AM:    CLINICAL INFORMATION: Chest tube.    INTERPRETATION:    ET tube tip approximately 4 cm above the jennie.  Enteric tube tip is likely in the stomach with the side port near the GE   junction.  Left chest wall ICD again seen.  Small loculated right pleural effusion with right mid and lower lung   opacities, not significantly changed. Previous concern for small   associated pneumothorax not seen. Question tiny right apical pneumothorax   versus apical bulla, unchanged.  Left chest tube unchanged in position. Left pneumothorax has resolved.   Left subcutaneous emphysema is slightly decreased. No left pleural   effusion.        IMPRESSION:  Left chest tube unchanged in position on the most recent   image with resolution of left pneumothorax. Slight decrease in left   subcutaneous emphysema.    Enteric tube tip likely in the stomach with side port near the GE   junction. Consider advancing the enteric tube. Discussed with Dr. Dobbins   with read back at 9:03 AM on January 3, 2023 by Dr. Sarmiento.    Small loculated right pleural effusion with right mid and lower lung   opacities, possibly atelectasis, although infection not excluded, not   significantly changed.    Continued question tiny right apical pneumothorax versus apical bulla.      Assessment :  Pt is a 62M CAD, Dilated cardiomyopathy, S/p AICD, Afib/flutter, h/o substance abuse, CKD baseline creat approx 1.4 last admitted to [facility] with MSSA bacteremia, and CHF.    Sent from [facility] with acute hypoxic respiratory failure. Was initially on BiPAP then became hypoxic.  Anesthesia intubated patient.  On post intubation Xray pneumothorax evident.  Patient with PEA arrest.  Chest tube placed by ED physician.  S/p PEA arrest  AHRF requiring intubation  Aspiration PNA   Loculated pleural effusions  - imaging reviewed  - BCx negative   - Sputum Cx -- Pseudomonas  - UCx -- Klebsiella  - WBC stable, afebrile  - s/p zosyn x10d course completed 1/1/2023  - pneumothorax  with Left CT x 2  -1/3 WBC up trending and hypothermic- restarted on Antibiotic- Meropenam as there was a concern for recurrent pneumonia  Off Meropenam since 1/10  WBC downtrending    Plan:  - Monitor off antibiotics  - Sp Meropenam x 7 days ended 1/10/23  - trend temps/WBC--stable  - maintain aspiration precautions  - chest tubes per pulm critical care  - vent management per ICU care  - GOC per primary team  - Overall prog poor      Continue with present regiment.  Appropriate use of antibiotics and adverse effects reviewed.      Critical care > 35 minutes were spent in direct patient care reviewing notes, medications ,labs data/ imaging , discussion with multidisciplinary team.    Thank you for allowing me to participate in care of your patient .    Kelley Phan MD  Infectious Disease  [phone]

## 2023-01-15 NOTE — PROGRESS NOTE ADULT - ASSESSMENT
63 y/o M CAD, Dilated cardiomyopathy, S/p AICD, Afib/flutter, h/o substance abuse,  presented with acute hypoxic and hypercarbic respiratory failure associated with pneumothorax which led to cardiac arrest       Acute respiratory failure secondary to tension pneumothorax with leading to cardiac arrest   - continue vent management;    - continue chest tubes to suction for now.   - duoneb inhalation   - serial xrays  no escalation of care, continue supporitve care     Aspiration pneumonia /Sputum Cx pseudomonas    completed zosyn x10 days , now off abx    hypernatremia  - resolved    Cardiomyopathy, Acute on chronic systolic CHF   - hold eliquis   - on dobutamine drip per cardiology  - amiodarone    DM2  - FS monitoring with lispro coverage scale while critically ill    DERRICK on CKD  - Likely ATN/ischemic   - monitor creatinine: improved but now increasing again.   - avoid nephrotoxic agents  - per renal : dialysis would likely cause harm, not provide for any meaningful recovery.     Prophylactic measure  - DVT proph: heparin SQ   - GI proph: protonix      Ethics consult appreciated.  Patient is in multiorgan system failure with no overall improvement in prognosis.  no further escalation in care would be appropriate

## 2023-01-15 NOTE — PROGRESS NOTE ADULT - ASSESSMENT
61 y/o Male with PMH of HTN, Dilated CM (s/p AICD), CKD, Opioid/ Substance Abuse, AFib/Flutter presents to  ED complaining of shortness of breath with:      1. Acute Hypoxic Respiratory Failure  2. L Tension PTX  3. PEA Cardiac Arrest   4. Acute Decompensated HF V Cardiogenic Shock   5. DERRICK on CKD      Neuro: Sedated on Propofol, titrate to RASS of 0 to -1. SAT. CT Head on admission reviewed, with no acute pathology. Repeat CT Head post arrest reviewed, with no acute pathology. Fentanyl PRN for agitation/ vent synchrony.   Resp: Intubated and mechanically ventilated. Goal TV 0.5cc/kg. Actively titrate FiO2 to maintain O2>93%. Currently on minimal vent settings. Vent bundle. SBT as tolerated. Aspiration precautions. L PTX stable, CT x2 in place. C/W Albuterol, Duonebs, and Atrovent.   CV: Currently on Dobutamine gtt, titrate as needed to maintain MAP >65. Remains on fixed dose at 5mcg/kg. Shock state likely secondary to PEA cardiac arrest. TTE reviewed with EF <10%. C/W Amiodarone and ASA. Cardiology following.   GI: NPO with Tube feed. NGT/ Rectal tube in place. CT AP on admission reviewed, with Evidence of urinary bladder cystitis.  Renal: DERRICK on CKD, (BUN/Cr 131/4.19). Likely ATN secondary to cardiac arrest and significantly reduced EF. Renal function remains poor, with little improvement. Bailey catheter in place. Continue to hold Bumex, Spironolactone and Eliquis in setting of DERRICK. Trend lytes, replete as needed to maintain K>4, Mg>3, and Phos >2. Avoid nephrotoxic agents. Renally dose medications. Nephro following.   ID: Leukocytosis downtrending, afebrile, s/p course of Zosyn and Meropenem. BCx, UA, UCx, SCx ordered, UCx with Klebsiella and SCx with Pseudomonas. Monitor off abx, trend WC and fever.   Heme: H/H stable, (). Trend H/H, transfuse if necessary for hgb <7. DVT prophylaxis with SubQ Heparin, held x2 days for bloody secretions. Restart this evening, likely secondary to suctioning trauma. SCDs in place.       Patient remains critically ill, in multiorgan failure unable to tolerate any further cardiac intervention. Overall poor prognosis at this time, likely will benefit from comfort care given low likelihood of meaningful recovery. Palliative care and Ethics following.  61 y/o Male with PMH of HTN, Dilated CM (s/p AICD), CKD, Opioid/ Substance Abuse, AFib/Flutter presents to  ED complaining of shortness of breath with:      1. Acute Hypoxic Respiratory Failure  2. L Tension PTX  3. PEA Cardiac Arrest   4. Acute Decompensated HF V Cardiogenic Shock   5. DERRICK on CKD      Neuro: Sedated on Propofol, titrate to RASS of 0 to -1. SAT. CT Head on admission reviewed, with no acute pathology. Repeat CT Head post arrest reviewed, with no acute pathology. Fentanyl PRN for agitation/ vent synchrony.   Resp: Intubated and mechanically ventilated. Goal TV 0.5cc/kg. Actively titrate FiO2 to maintain O2>93%. Currently on minimal vent settings. Vent bundle. SBT as tolerated. Aspiration precautions. L PTX stable, CT x2 in place. C/W Albuterol, Duonebs, and Atrovent.   CV: Currently on Dobutamine gtt, titrate as needed to maintain MAP >65. Remains on fixed dose at 5mcg/kg. Shock state likely secondary to PEA cardiac arrest. TTE reviewed with EF <10%. C/W Amiodarone and ASA. Cardiology following.   GI: NPO with Tube feed. NGT/ Rectal tube in place. CT AP on admission reviewed, with Evidence of urinary bladder cystitis. GI prophylaxis with Protonix.   Renal: DERRICK on CKD, (BUN/Cr 131/4.19). Likely ATN secondary to cardiac arrest and significantly reduced EF. Renal function remains poor, with little improvement. Bailey catheter in place. Continue to hold Bumex, Spironolactone and Eliquis in setting of DERRICK. Trend lytes, replete as needed to maintain K>4, Mg>3, and Phos >2. Avoid nephrotoxic agents. Renally dose medications. Nephro following.   ID: Leukocytosis downtrending, afebrile, s/p course of Zosyn and Meropenem. BCx, UA, UCx, SCx ordered, UCx with Klebsiella and SCx with Pseudomonas. Monitor off abx, trend WC and fever.   Heme: H/H stable, (). Trend H/H, transfuse if necessary for hgb <7. DVT prophylaxis with SubQ Heparin, held x2 days for bloody secretions. Restart this evening, likely secondary to suctioning trauma. SCDs in place.       Patient remains critically ill, in multiorgan failure unable to tolerate any further cardiac intervention. Overall poor prognosis at this time, likely will benefit from comfort care given low likelihood of meaningful recovery. Palliative care and Ethics following.

## 2023-01-15 NOTE — PROGRESS NOTE ADULT - SUBJECTIVE AND OBJECTIVE BOX
Patient is a 62y old  Male who presents with a chief complaint of resp failure (14 Jan 2023 23:35)        HPI:  62M CAD, Dilated cardiomyopathy, S/p AICD, Afib/flutter, h/o substance abuse, CKD baseline creat approx 1.4 last admitted to [facility] with MSSA bacteremia, and CHF.  Sent from [facility] with acute hypoxic respiratory failure.  Was initially on BiPAP then became hypoxic.  Anesthesia intubated patient.  On post intubation Xray pneumothorax evident.  Patient with PEA arrest.  Chest tube placed by ED physician.  ROSC obtained.     Patient unable to give further history.     (23 Dec 2022 13:02)      SUBJECTIVE & OBJECTIVE: Pt seen and examined at bedside. chest tube, vent /peged     PHYSICAL EXAM:  T(C): 37 (01-15-23 @ 05:00), Max: 37 (01-15-23 @ 05:00)  HR: 89 (01-15-23 @ 07:51) (79 - 100)  BP: 126/77 (01-15-23 @ 06:00) (113/72 - 134/92)  RR: 18 (01-15-23 @ 06:00) (15 - 25)  SpO2: 98% (01-15-23 @ 07:51) (95% - 100%)  Wt(kg): --   GENERAL: respiratory failure +   HEAD:  Atraumatic, Normocephalic  NERVOUS SYSTEM:  Alert   CHEST/LUNG: decrease air entry at the bases . chest tube   HEART: Regular rate and rhythm; No murmurs, rubs, or gallops  ABDOMEN: Soft, Nontender, Nondistended; Bowel sounds present  EXTREMITIES:  2+ Peripheral Pulses, No clubbing, cyanosis, or edema        MEDICATIONS  (STANDING):  albuterol    90 MICROgram(s) HFA Inhaler 2 Puff(s) Inhalation every 6 hours  albuterol/ipratropium for Nebulization. 3 milliLiter(s) Nebulizer once  aMIOdarone    Tablet 200 milliGRAM(s) Oral daily  aspirin  chewable 81 milliGRAM(s) Oral daily  chlorhexidine 0.12% Liquid 15 milliLiter(s) Oral Mucosa every 12 hours  dextrose 5%. 1000 milliLiter(s) (100 mL/Hr) IV Continuous <Continuous>  dextrose 50% Injectable 25 Gram(s) IV Push once  dextrose 50% Injectable 12.5 Gram(s) IV Push once  dextrose 50% Injectable 25 Gram(s) IV Push once  DOBUTamine Infusion 5 MICROgram(s)/kG/Min (12.2 mL/Hr) IV Continuous <Continuous>  glucagon  Injectable 1 milliGRAM(s) IntraMuscular once  heparin   Injectable 5000 Unit(s) SubCutaneous every 8 hours  insulin lispro (ADMELOG) corrective regimen sliding scale   SubCutaneous every 6 hours  ipratropium 17 MICROgram(s) HFA Inhaler 1 Puff(s) Inhalation every 6 hours  nystatin Powder 1 Application(s) Topical two times a day  pantoprazole  Injectable 40 milliGRAM(s) IV Push daily  propofol Infusion 10 MICROgram(s)/kG/Min (4.9 mL/Hr) IV Continuous <Continuous>    MEDICATIONS  (PRN):  dextrose Oral Gel 15 Gram(s) Oral once PRN Blood Glucose LESS THAN 70 milliGRAM(s)/deciliter  glycopyrrolate Injectable 0.2 milliGRAM(s) IV Push every 6 hours PRN for secretions      LABS:                        10.8   11.05 )-----------( 221      ( 14 Jan 2023 06:00 )             34.5     01-14    142  |  103  |  144<H>  ----------------------------<  111<H>  4.9   |  18<L>  |  4.69<H>    Ca    8.7      14 Jan 2023 06:00  Phos  8.1     01-14  Mg     3.0     01-14            CAPILLARY BLOOD GLUCOSE      POCT Blood Glucose.: 116 mg/dL (15 David 2023 05:31)  POCT Blood Glucose.: 115 mg/dL (14 Jan 2023 23:27)  POCT Blood Glucose.: 114 mg/dL (14 Jan 2023 18:09)  POCT Blood Glucose.: 100 mg/dL (14 Jan 2023 11:11)      CAPILLARY BLOOD GLUCOSE      POCT Blood Glucose.: 116 mg/dL (15 David 2023 05:31)  POCT Blood Glucose.: 115 mg/dL (14 Jan 2023 23:27)  POCT Blood Glucose.: 114 mg/dL (14 Jan 2023 18:09)  POCT Blood Glucose.: 100 mg/dL (14 Jan 2023 11:11)    CAPILLARY BLOOD GLUCOSE      POCT Blood Glucose.: 116 mg/dL (15 David 2023 05:31)            RECENT CULTURES:      RADIOLOGY & ADDITIONAL TESTS:                        DVT/GI ppx  Discussed with pt @ bedside

## 2023-01-15 NOTE — PROGRESS NOTE ADULT - ASSESSMENT
REVIEW OF SYMPTOMS      Able to give (reliable) ROS  NO     PHYSICAL EXAM    HEENT Unremarkable  atraumatic   RESP Fair air entry EXP prolonged    Harsh breath sound Resp distres mild   CARDIAC S1 S2 No S3     NO JVD    ABDOMEN SOFT BS PRESENT NOT DISTENDED No hepatosplenomegaly   PEDAL EDEMA present No calf tenderness  NO rash       GENERAL DATA .   GOC.   12/23/2022 full code  ALLGY.     nka                  WT.     12/24/2022 81           BMI.       12/24/2022 26          ICU STAY. .. 12/23/2022  COVID. ..  12/23/2022 scv2 (-)   BEST PRACTICE ISSUES.    HOB ELEVATN. Yes  DVT PPLX. ..    12/23/2022 hpsc   WHITMORE PPLX. ..    12/23/2022 protonix 40   INFN PPLX. ..  12/23/2022 chlorhexidine .12%   SP SW VIVIAN.         DIET.  .. 12/26 nepro 960 ng    IV fl...  FREE WATER 1/9/2023 free water 250.3   PROCEDURES...   12/23/2022  l chest tube   12/23/2022 intubated   12/23/2022 reed     ABGS.  1/6/2023 vent 30% 739/28/65     VS/ PO/IO/ VENT/ DRIPS.  1/15/2023 110 110/70   1/15/2023 6p dobu 5 m/k/m   1/15/2023 6p prop 35 m/k/m     AGE doa cc.  62 m doa 12/23/2022 resp distress    PREV ADMISSION 2/11-2/25/2022 [facility]    PMH  PMH COPD  PMH COVID (+) 2/11/2022   PMH S aureus bacteremia mssa 2/11   .. Ancef 2/12/2022 Dr Phan  PMH AICD  PMH HFREF  .. ECHO 11/20/2021 ef 20%  PMH A fib (on eliquis)     PROBLEMS ASSESSMENT RECOMMENDATIONS.  VENT   .. bundle dsv dsbt ltvv pplat 30 (-) PO2 60 (+) ph 7.3 (+)  .. lung protective ventilatn   .. wean as told if fails then trach  PROLONGED INTUBATN.  .. Plan trach vs GOC determination   WEANING  .. If tolerates cpap will plan extrubation   SECRETIONS.  .. 1/14/2023 glycopyrrolate 0.2 4/dp   BRONCHOSPASM.  .. 12/25 albuterol hfa  .. 12/25 atrovent hfa   SEDATION.  .. dexm 12/30-1/6  .. prop 1/6 -->   .. target rass 0 to (-) 1   HEMOPTYSIS   .. 1/3 given ddavp 25  .. 5 d meropenem started 1/3   .. 1/5/2023 no further hemoptysis   INFECTION.  .. w 1/6-1/9-1/12-1/13-1/14-1/15/2023       w 19  - 16- 11.8- 11.4- 11  .. 1/6/2023 Has leukocytosis - 9.8   MICRO  .. mrsa 12/23 (+)   .. 12/24 et pseudo  .. 12/23  klebsiella   ANTIBIO.  .. 12/23-1/1 zosyn completed  .. 1/3-1/10 meroipenem  PLAN  .. 1/3 meroipenem started as hemoptysis leukocytosi   .. 1/13/2023 off abio   A fib chronic   .. 12/30 amiodarone 200   DERRICK   .. Cr 1/2-1/9-1/12-1/13-1/14-1/15/2023       Cr 5.1 - 3.4- 4.3- 4.8- 4.6 - 4.1     .. HD if decided by renal   .. Creat improving   CHEST TUBE   .. 2nd chest tube placed on 1/3 because of worsening pntx   .. to be removed after trach or extubation which ever comes first   CHF.  .. ECHO 11/20/2021 ef 20%  .. On dobu started 1/3-->   ANEMIA.  .. 1/13-1/15/2023 Hb 9.7- 10.3    NEURO  .. 1/10/2023 Not much progress Opens eyes some movement in lue   OVERALL.  .. GOC determination   .. 1/12/2023 4:39 PM Multidisciplinary meeting was held    TIME SPENT   Over 39 minutes aggregate critical care time spent on encounter; activities included   direct patient care, counseling and/or coordinating care reviewing notes, lab data/ imaging , discussion with multidisciplinary team/ patient  /family and explaining in detail risks, benefits, alternatives  of the recommendations     BIPIN DE LA CRUZ 61 m 12/23/2022 1960 DR KELVIN VANESSA

## 2023-01-15 NOTE — PROGRESS NOTE ADULT - SUBJECTIVE AND OBJECTIVE BOX
INTERVAL HPI/OVERNIGHT EVENTS:  No new overnight event.  No N/V/D.  Tolerating diet.    Allergies    No Known Allergies    Intolerances    pork (Other)    General:  No wt loss, fevers, chills, night sweats, fatigue,   Eyes:  Good vision, no reported pain  ENT:  No sore throat, pain, runny nose, dysphagia  CV:  No pain, palpitations, hypo/hypertension  Resp:  No dyspnea, cough, tachypnea, wheezing  GI:  No pain, No nausea, No vomiting, No diarrhea, No constipation, No weight loss, No fever, No pruritis, No rectal bleeding, No tarry stools, No dysphagia,  :  No pain, bleeding, incontinence, nocturia  Muscle:  No pain, weakness  Neuro:  No weakness, tingling, memory problems  Psych:  No fatigue, insomnia, mood problems, depression  Endocrine:  No polyuria, polydipsia, cold/heat intolerance  Heme:  No petechiae, ecchymosis, easy bruisability  Skin:  No rash, tattoos, scars, edema      PHYSICAL EXAM:   Vital Signs:  Vital Signs Last 24 Hrs  T(C): 37 (15 David 2023 05:00), Max: 37 (15 David 2023 05:00)  T(F): 98.6 (15 David 2023 05:00), Max: 98.6 (15 David 2023 05:00)  HR: 86 (15 David 2023 14:02) (79 - 99)  BP: 120/79 (15 David 2023 13:00) (118/77 - 134/92)  BP(mean): 92 (15 David 2023 13:00) (90 - 103)  RR: 16 (15 David 2023 13:00) (15 - 25)  SpO2: 97% (15 David 2023 14:02) (95% - 100%)    Parameters below as of 15 David 2023 13:51  Patient On (Oxygen Delivery Method): ventilator,35      Daily     Daily Weight in k.2 (15 David 2023 06:00)I&O's Summary    2023 07:01  -  15 David 2023 07:00  --------------------------------------------------------  IN: .4 mL / OUT: 1600 mL / NET: 520.4 mL    15 David 2023 07:01  -  15 2023 14:20  --------------------------------------------------------  IN: 85.5 mL / OUT: 0 mL / NET: 85.5 mL        GENERAL:  Appears stated age, well-groomed, well-nourished, no distress  HEENT:  NC/AT,  conjunctivae clear and pink, no thyromegaly, nodules, adenopathy, no JVD, sclera -anicteric  CHEST:  Full & symmetric excursion, no increased effort, breath sounds clear  HEART:  Regular rhythm, S1, S2, no murmur/rub/S3/S4, no abdominal bruit, no edema  ABDOMEN:  Soft, non-tender, non-distended, normoactive bowel sounds,  no masses ,no hepato-splenomegaly, no signs of chronic liver disease  EXTEREMITIES:  no cyanosis,clubbing or edema  SKIN:  No rash/erythema/ecchymoses/petechiae/wounds/abscess/warm/dry  NEURO:  Alert, oriented, no asterixis, no tremor, no encephalopathy      LABS:                        10.3   9.83  )-----------( 264      ( 15 David 2023 12:28 )             32.8     01-15    141  |  104  |  131<H>  ----------------------------<  124<H>  3.9   |  24  |  4.19<H>    Ca    9.3      15 David 2023 12:35  Phos  8.1     -14  Mg     3.0     -14          amylase   lipase  RADIOLOGY & ADDITIONAL TESTS:

## 2023-01-15 NOTE — PROVIDER CONTACT NOTE (EICU) - BACKGROUND
Was called by RN as patient has sinus tach and increased secretions   mild tachypnea on vent  radiology and history reviewed

## 2023-01-15 NOTE — PROGRESS NOTE ADULT - ASSESSMENT
The patient is a 62 year old male with a history of CAD, chronic systolic heart failure s/p ICD, atrial flutter s/p DCCV, substance abuse, CKD who is admitted with respiratory failure in the setting of tension pneumothorax complicated by cardiac arrest.    Plan:  - ECG with sinus tachycardia and known LBBB  - Echo 2/22 with severely reduced LV (EF 10%) and RV function, mod MR, mod TR, mild pulm HTN  - Not on beta blocker as the patient is on dobutamine. Not a candidate for ACEI/ARB/ARNI at the current time due to renal function.  - Poor renal function - hold apixaban  - Hold bumetanide and spironolactone due to DERRICK  - Continue amiodarone 200 mg daily  - Continue aspirin 81 mg daily  - Continue dobutamine 5 mcg/kg/min - will wean either after trach or if transition to comfort care  - Mechanical ventilation  - ICU care  - Overall poor prognosis. Comfort care would be most appropriate. The patient is not a candidate for further cardiac intervention.

## 2023-01-16 NOTE — PROGRESS NOTE ADULT - SUBJECTIVE AND OBJECTIVE BOX
Patient is a 62y old  Male who presents with a chief complaint of resp failure (16 Jan 2023 13:00)    Patient seen in follow up for DERRICK       PAST MEDICAL HISTORY:  Heart failure, systolic    CAD (coronary artery disease)    Hypertension    Nonischemic cardiomyopathy    COPD, moderate    2019 novel coronavirus disease (COVID-19)    Substance abuse    HLD (hyperlipidemia)    Cardiac LV ejection fraction 10-20%      MEDICATIONS  (STANDING):  albuterol    90 MICROgram(s) HFA Inhaler 2 Puff(s) Inhalation every 6 hours  aMIOdarone    Tablet 200 milliGRAM(s) Oral daily  aspirin  chewable 81 milliGRAM(s) Oral daily  chlorhexidine 0.12% Liquid 15 milliLiter(s) Oral Mucosa every 12 hours  dextrose 5%. 1000 milliLiter(s) (100 mL/Hr) IV Continuous <Continuous>  dextrose 50% Injectable 25 Gram(s) IV Push once  dextrose 50% Injectable 12.5 Gram(s) IV Push once  dextrose 50% Injectable 25 Gram(s) IV Push once  DOBUTamine Infusion 2.5 MICROgram(s)/kG/Min (6.12 mL/Hr) IV Continuous <Continuous>  glucagon  Injectable 1 milliGRAM(s) IntraMuscular once  heparin   Injectable 5000 Unit(s) SubCutaneous every 8 hours  insulin lispro (ADMELOG) corrective regimen sliding scale   SubCutaneous every 6 hours  ipratropium 17 MICROgram(s) HFA Inhaler 1 Puff(s) Inhalation every 6 hours  nystatin Powder 1 Application(s) Topical two times a day  pantoprazole  Injectable 40 milliGRAM(s) IV Push daily  propofol Infusion 10 MICROgram(s)/kG/Min (4.9 mL/Hr) IV Continuous <Continuous>    MEDICATIONS  (PRN):  dextrose Oral Gel 15 Gram(s) Oral once PRN Blood Glucose LESS THAN 70 milliGRAM(s)/deciliter  glycopyrrolate Injectable 0.2 milliGRAM(s) IV Push every 6 hours PRN for secretions    T(C): 36.4 (01-16-23 @ 08:39), Max: 37.1 (01-16-23 @ 02:00)  HR: 112 (01-16-23 @ 11:00) (79 - 130)  BP: 113/81 (01-16-23 @ 11:00) (103/67 - 134/92)  RR: 18 (01-16-23 @ 11:00) (14 - 39)  SpO2: 100% (01-16-23 @ 11:00) (94% - 100%)  Wt(kg): --  I&O's Detail    15 David 2023 07:01  -  16 Jan 2023 07:00  --------------------------------------------------------  IN:    DOBUTamine: 378.2 mL    Enteral Tube Flush: 750 mL    Glucerna 1.5: 40 mL    Nepro with Carb Steady: 960 mL    Propofol: 239.7 mL  Total IN: 2367.9 mL    OUT:    Chest Tube (mL): 0 mL    Chest Tube (mL): 0 mL    Indwelling Catheter - Urethral (mL): 1700 mL  Total OUT: 1700 mL    Total NET: 667.9 mL          PHYSICAL EXAM:  General: on vent  Respiratory: b/l air entry  Cardiovascular: S1 S2  Gastrointestinal: soft  Extremities:  edema    Mode: AC/ CMV (Assist Control/ Continuous Mandatory Ventilation), RR (machine): 20, TV (machine): 400, FiO2: 35, PEEP: 5, ITime: 1, MAP: 9, PIP: 17                          10.3   9.83  )-----------( 264      ( 15 David 2023 12:28 )             32.8     01-16    141  |  106  |  129<H>  ----------------------------<  108<H>  4.0   |  22  |  3.88<H>    Ca    8.8      16 Jan 2023 06:00  Phos  5.5     01-16  Mg     3.6     01-16    TPro  8.1  /  Alb  1.9<L>  /  TBili  0.7  /  DBili  x   /  AST  63<H>  /  ALT  36  /  AlkPhos  273<H>  01-16        LIVER FUNCTIONS - ( 16 Jan 2023 06:00 )  Alb: 1.9 g/dL / Pro: 8.1 g/dL / ALK PHOS: 273 U/L / ALT: 36 U/L DA / AST: 63 U/L / GGT: x               Sodium, Serum: 141 (01-16 @ 06:00)  Sodium, Serum: 141 (01-15 @ 12:35)  Sodium, Serum: 142 (01-14 @ 06:00)  Sodium, Serum: 142 (01-13 @ 06:00)    Creatinine, Serum: 3.88 (01-16 @ 06:00)  Creatinine, Serum: 4.19 (01-15 @ 12:35)  Creatinine, Serum: 4.69 (01-14 @ 06:00)  Creatinine, Serum: 4.85 (01-13 @ 06:00)    Potassium, Serum: 4.0 (01-16 @ 06:00)  Potassium, Serum: 3.9 (01-15 @ 12:35)  Potassium, Serum: 4.9 (01-14 @ 06:00)  Potassium, Serum: 4.1 (01-13 @ 06:00)    Hemoglobin: 10.3 (01-15 @ 12:28)  Hemoglobin: 10.8 (01-14 @ 06:00)  Hemoglobin: 9.7 (01-13 @ 06:00)

## 2023-01-16 NOTE — PROGRESS NOTE ADULT - SUBJECTIVE AND OBJECTIVE BOX
Chief Complaint: Respiratory failure    Interval Events: No events overnight.    Review of Systems:  Unable to obtain    Physical Exam:  Vital Signs Last 24 Hrs  T(C): 36.4 (16 Jan 2023 08:39), Max: 37.1 (16 Jan 2023 02:00)  T(F): 97.5 (16 Jan 2023 08:39), Max: 98.8 (16 Jan 2023 02:00)  HR: 96 (16 Jan 2023 09:10) (80 - 130)  BP: 116/75 (16 Jan 2023 09:00) (103/67 - 126/86)  BP(mean): 87 (16 Jan 2023 09:00) (78 - 100)  RR: 22 (16 Jan 2023 09:00) (14 - 39)  SpO2: 96% (16 Jan 2023 09:10) (94% - 100%)  Parameters below as of 16 Jan 2023 09:10  Patient On (Oxygen Delivery Method): ventilator  General: Sedated  HEENT: Intubated  Neck: No JVD, no carotid bruit  Lungs: CTAB  CV: RRR, nl S1/S2, no M/R/G  Abdomen: S/NT/ND, +BS  Extremities: No LE edema, no cyanosis  Neuro: AAOx0  Skin: No rash    Labs:    01-16    141  |  106  |  129<H>  ----------------------------<  108<H>  4.0   |  22  |  3.88<H>    Ca    8.8      16 Jan 2023 06:00  Phos  5.5     01-16  Mg     3.6     01-16    TPro  8.1  /  Alb  1.9<L>  /  TBili  0.7  /  DBili  x   /  AST  63<H>  /  ALT  36  /  AlkPhos  273<H>  01-16                        10.3   9.83  )-----------( 264      ( 15 David 2023 12:28 )             32.8       ECG/Telemetry: Sinus rhythm

## 2023-01-16 NOTE — PROGRESS NOTE ADULT - SUBJECTIVE AND OBJECTIVE BOX
DOROTHY VOSS is a 62yMale , patient examined and chart reviewed.     INTERVAL HPI/ OVERNIGHT EVENTS:   Afebrile. Remains Vented sedated.  No events.    PAST MEDICAL & SURGICAL HISTORY:  Heart failure, systolic  CAD (coronary artery disease)  Hypertension  Nonischemic cardiomyopathy  COPD, moderate  2019 novel coronavirus disease (COVID-19)  Substance abuse  HLD (hyperlipidemia)  Cardiac LV ejection fraction 10-20%  AICD (automatic cardioverter/defibrillator) present  Cardiac LV ejection fraction 10-20%        For details regarding the patient's social history, family history, and other miscellaneous elements, please refer the initial infectious diseases consultation and/or the admitting history and physical examination for this admission.    ROS:  Unable to obtain due to : pt's condition    ALLERGIES  pork (Other)      Current inpatient medications :    ANTIBIOTICS/RELEVANT:    MEDICATIONS  (STANDING):  albuterol    90 MICROgram(s) HFA Inhaler 2 Puff(s) Inhalation every 6 hours  aMIOdarone    Tablet 200 milliGRAM(s) Oral daily  aspirin  chewable 81 milliGRAM(s) Oral daily  chlorhexidine 0.12% Liquid 15 milliLiter(s) Oral Mucosa every 12 hours  dextrose 5%. 1000 milliLiter(s) (100 mL/Hr) IV Continuous <Continuous>  dextrose 50% Injectable 25 Gram(s) IV Push once  dextrose 50% Injectable 12.5 Gram(s) IV Push once  dextrose 50% Injectable 25 Gram(s) IV Push once  DOBUTamine Infusion 2.5 MICROgram(s)/kG/Min (6.12 mL/Hr) IV Continuous <Continuous>  glucagon  Injectable 1 milliGRAM(s) IntraMuscular once  heparin   Injectable 5000 Unit(s) SubCutaneous every 8 hours  insulin lispro (ADMELOG) corrective regimen sliding scale   SubCutaneous every 6 hours  ipratropium 17 MICROgram(s) HFA Inhaler 1 Puff(s) Inhalation every 6 hours  nystatin Powder 1 Application(s) Topical two times a day  pantoprazole  Injectable 40 milliGRAM(s) IV Push daily  propofol Infusion 10 MICROgram(s)/kG/Min (4.9 mL/Hr) IV Continuous <Continuous>    MEDICATIONS  (PRN):  dextrose Oral Gel 15 Gram(s) Oral once PRN Blood Glucose LESS THAN 70 milliGRAM(s)/deciliter  glycopyrrolate Injectable 0.2 milliGRAM(s) IV Push every 6 hours PRN for secretions      Objective:  Vital Signs Last 24 Hrs  T(C): 36.6 (16 Jan 2023 12:30), Max: 37.1 (16 Jan 2023 02:00)  T(F): 97.9 (16 Jan 2023 12:30), Max: 98.8 (16 Jan 2023 02:00)  HR: 109 (16 Jan 2023 16:00) (85 - 130)  BP: 121/85 (16 Jan 2023 16:00) (103/67 - 126/84)  BP(mean): 96 (16 Jan 2023 16:00) (78 - 100)  RR: 25 (16 Jan 2023 16:00) (14 - 39)  SpO2: 94% (16 Jan 2023 16:00) (94% - 100%)    Parameters below as of 16 Jan 2023 16:00  Patient On (Oxygen Delivery Method): ventilator    O2 Concentration (%): 35  Mode: AC/ CMV (Assist Control/ Continuous Mandatory Ventilation), RR (machine): 20, TV (machine): 400, FiO2: 35, PEEP: 5, ITime: 1, MAP: 10, PIP: 15    Physical Exam:  General: vented sedated +NGT  Neck: supple, trachea midline  Lungs: decreased no wheeze/rhonchi Left chest tubes x 2  Cardiovascular: regular rate and rhythm, S1 S2  Abdomen: soft, nontender,  bowel sounds normal  Neurological: sedated  Skin: no rash  Extremities: +edema        LABS:                                 10.3   9.83  )-----------( 264      ( 15 David 2023 12:28 )             32.8   01-16    141  |  106  |  129<H>  ----------------------------<  108<H>  4.0   |  22  |  3.88<H>    Ca    8.8      16 Jan 2023 06:00  Phos  5.5     01-16  Mg     3.6     01-16    TPro  8.1  /  Alb  1.9<L>  /  TBili  0.7  /  DBili  x   /  AST  63<H>  /  ALT  36  /  AlkPhos  273<H>  01-16      MICROBIOLOGY:  Culture - Sputum . (12.24.22 @ 17:43)    Gram Stain:   Numerous polymorphonuclear leukocytes per low power field  No Squamous epithelial cells per low power field  Rare Gram Positive Rods seen per oil power field    -  Amikacin: S <=16    -  Aztreonam: S 8    -  Cefepime: S <=2    -  Ceftazidime: S 4    -  Ciprofloxacin: S <=0.25    -  Gentamicin: S 4    -  Imipenem: S <=1    -  Levofloxacin: S <=0.5    -  Meropenem: S <=1    -  Piperacillin/Tazobactam: S <=8    -  Tobramycin: S <=2    Specimen Source: ET Tube ET Tube    Culture Results:   Rare Pseudomonas aeruginosa  Normal Respiratory Cathy absent    Organism Identification: Pseudomonas aeruginosa    Organism: Pseudomonas aeruginosa    Method Type: BAY    Culture - Urine (12.23.22 @ 11:16)    -  Tobramycin: S <=2    -  Amoxicillin/Clavulanic Acid: S <=8/4    -  Ampicillin: R 16 These ampicillin results predict results for amoxicillin    -  Ampicillin/Sulbactam: S <=4/2 Enterobacter, Klebsiella aerogenes, Citrobacter, and Serratia may develop resistance during prolonged therapy (3-4 days)    -  Amikacin: S <=16    -  Cefazolin: S <=2    -  Cefoxitin: S <=8    -  Aztreonam: S <=4    -  Cefepime: S <=2    -  Imipenem: S <=1    -  Levofloxacin: S <=0.5    -  Ceftriaxone: S <=1 Enterobacter, Klebsiella aerogenes, Citrobacter, and Serratia may develop resistance during prolonged therapy    -  Ciprofloxacin: S <=0.25    -  Trimethoprim/Sulfamethoxazole: S <=0.5/9.5    -  Meropenem: S <=1    -  Nitrofurantoin: S <=32 Should not be used to treat pyelonephritis    -  Ertapenem: S <=0.5    -  Gentamicin: S <=2    -  Piperacillin/Tazobactam: S <=8    Specimen Source: Clean Catch Clean Catch (Midstream)    Culture Results:   >100,000 CFU/ml Klebsiella oxytoca/Raoultella ornithinolytica    Organism Identification: Klebsiella oxytoca /Raoutella ornithinolytica    Organism: Klebsiella oxytoca /Raoutella ornithinolytica    Method Type: BAY      Culture - Blood (12.23.22 @ 14:06)    Specimen Source: .Blood Blood-Peripheral    Culture Results:   No Growth Final      RADIOLOGY & ADDITIONAL STUDIES:    ACC: [id number] EXAM:  CT CHEST                          PROCEDURE DATE:  12/29/2022          INTERPRETATION:  HISTORY: Admitting Dxs: J96.01 RESP DISTRESS    EXAMINATION: CT CHEST was performed without IV contrast.    COMPARISON: 12/3/2022.    FINDINGS:    AIRWAYS, LUNGS, PLEURA: Satisfactory positioning of endotracheal tube.   Mild central airways secretions. Unchanged small loculated left   pneumothorax. Small chronic loculated right pleural effusion unchanged.   Right basilar and right middlelobe atelectasis. Emphysema.    Left chest tube in place with distal tip along the medial and upper   pleural space.    MEDIASTINUM: Cardiomegaly. No pericardial effusion. Thoracic aorta normal   caliber.  No large mediastinal lymph nodes. ICD lead terminates in the   right ventricle.    IMAGED ABDOMEN: Enteric catheter terminates in the stomach.   Cholelithiasis. Decreased abdominal extraluminal gas.    SOFT TISSUES: Decreased subcutaneous soft tissue emphysema.    BONES: Unremarkable.      IMPRESSION:.    Unchanged small loculated left pneumothorax.    Unchanged small loculated and chronic right pleural effusion.    Decreased subcutaneous soft tissue emphysema and extraluminal abdominal   gas.      ACC: [id number] EXAM:  XR CHEST PORTABLE IMMED 1V                        ACC: [id number] EXAM:  XR CHEST PORTABLE URGENT 1V                        ACC: [id number] EXAM:  XR CHEST PORTABLE URGENT 1V                        ACC: [id number] EXAM:  XR CHEST PORTABLE IMMED 1V                          PROCEDURE DATE:  01/03/2023          INTERPRETATION:  TIME OF EXAM: January 2, 2023 at 11:46 PM and 11:47 PM.    CLINICAL INFORMATION: Status post cardiac arrest. Respiratory distress   and abnormal chest sounds.    COMPARISON:  CT scan of the chest from December 29, 2022.    TECHNIQUE:   AP Portable chest x-ray.    INTERPRETATION:    The cardiac silhouette is enlarged. There is a left chest wall ICD with   intact lead with tip in expected region of the right ventricle. The   generator obscures part of the left lung.  The mediastinum is not accurately evaluated on these images.  ET tube tip is approximately 7.4 cm above the jennie.  Enteric tube tip is near the GE junction with side port in the distal   esophagus.  Left chest tube not significantly changed in position.  No significant change in small loculated right pleural effusion and right   mid and lower lung opacities. Question small pneumothorax associated with   the pleural effusion versus interposed aerated lung. Question tiny right   apical pneumothorax versus apical bulla.  Large left pneumothorax, increased in size. Concern for tension as there   is inversion of the left hemidiaphragm and widening of the rib   interspaces on the left compared to the right. Atelectasis of underlying   lung.  No acute bony abnormality.    AP portable chest x-ray from January 3, 2023 at 12:05 AM and 12:06 AM:    CLINICAL INFORMATION: Pneumothorax.    INTERPRETATION:    ET tube tip is 5.7 cm above the jennie. Enteric tube and left chest tube   unchanged in position.  Large left pneumothorax with concerns for tension, not significantly   changed.  Right-sided findings not significantly changed.    AP portable chest x-ray from January 3, 2023 at 12:53 AM and 12:54 AM:    CLINICAL INFORMATION: Replaced left chest tube.    INTERPRETATION:    ET tube tip is 6.2 cm above the jennie.  Enteric tube tip near GE junction with side port in the distal esophagus.  Left chest tube with tip projecting over the aortic knob.  Large left pneumothorax with concerns for tension, not significantly   changed. Continued atelectasis of underlying lung.  New left subcutaneous emphysema.  Small loculated right pleural effusion with likely associated passive   atelectasis is not significantly changed. Once again a small pneumothorax   component is not excluded. In addition, continued question of minimal   right apical pneumothorax versus apical bulla.    AP portable chest x-ray from January 3, 2023 at 1:35 AM:    CLINICAL INFORMATION: Chest tube.    INTERPRETATION:    ET tube tip approximately 4 cm above the jennie.  Enteric tube tip is likely in the stomach with the side port near the GE   junction.  Left chest wall ICD again seen.  Small loculated right pleural effusion with right mid and lower lung   opacities, not significantly changed. Previous concern for small   associated pneumothorax not seen. Question tiny right apical pneumothorax   versus apical bulla, unchanged.  Left chest tube unchanged in position. Left pneumothorax has resolved.   Left subcutaneous emphysema is slightly decreased. No left pleural   effusion.        IMPRESSION:  Left chest tube unchanged in position on the most recent   image with resolution of left pneumothorax. Slight decrease in left   subcutaneous emphysema.    Enteric tube tip likely in the stomach with side port near the GE   junction. Consider advancing the enteric tube. Discussed with Dr. Dobbins   with read back at 9:03 AM on January 3, 2023 by Dr. Sarmiento.    Small loculated right pleural effusion with right mid and lower lung   opacities, possibly atelectasis, although infection not excluded, not   significantly changed.    Continued question tiny right apical pneumothorax versus apical bulla.      Assessment :  Pt is a 62M CAD, Dilated cardiomyopathy, S/p AICD, Afib/flutter, h/o substance abuse, CKD baseline creat approx 1.4 last admitted to [facility] with MSSA bacteremia, and CHF.    Sent from [facility] with acute hypoxic respiratory failure. Was initially on BiPAP then became hypoxic.  Anesthesia intubated patient.  On post intubation Xray pneumothorax evident.  Patient with PEA arrest.  Chest tube placed by ED physician.  S/p PEA arrest  AHRF requiring intubation  Aspiration PNA   Loculated pleural effusions  - imaging reviewed  - BCx negative   - Sputum Cx -- Pseudomonas  - UCx -- Klebsiella  - WBC stable, afebrile  - s/p zosyn x10d course completed 1/1/2023  - pneumothorax  with Left CT x 2  -1/3 WBC up trending and hypothermic- restarted on Antibiotic- Meropenam as there was a concern for recurrent pneumonia  Off Meropenam since 1/10  WBC downtrending    Plan:  - Monitor off antibiotics  - Sp Meropenam x 7 days ended 1/10/23  - trend temps/WBC--stable  - maintain aspiration precautions  - chest tubes per pulm critical care  - vent management per ICU care  - GOC per primary team  - Overall prog poor      Continue with present regiment.  Appropriate use of antibiotics and adverse effects reviewed.      Critical care > 35 minutes were spent in direct patient care reviewing notes, medications ,labs data/ imaging , discussion with multidisciplinary team.    Thank you for allowing me to participate in care of your patient .    Kelley Phan MD  Infectious Disease  [phone]

## 2023-01-16 NOTE — PROGRESS NOTE ADULT - SUBJECTIVE AND OBJECTIVE BOX
Date/Time Patient Seen:  		  Referring MD:   Data Reviewed	       Patient is a 62y old  Male who presents with a chief complaint of resp failure (16 Jan 2023 03:51)      Subjective/HPI     PAST MEDICAL & SURGICAL HISTORY:  Heart failure, systolic    CAD (coronary artery disease)    Hypertension    Nonischemic cardiomyopathy    COPD, moderate    2019 novel coronavirus disease (COVID-19)    Substance abuse    HLD (hyperlipidemia)    Cardiac LV ejection fraction 10-20%    No significant past surgical history    AICD (automatic cardioverter/defibrillator) present    Cardiac LV ejection fraction 10-20%          Medication list         MEDICATIONS  (STANDING):  albuterol    90 MICROgram(s) HFA Inhaler 2 Puff(s) Inhalation every 6 hours  albuterol/ipratropium for Nebulization. 3 milliLiter(s) Nebulizer once  aMIOdarone    Tablet 200 milliGRAM(s) Oral daily  aspirin  chewable 81 milliGRAM(s) Oral daily  chlorhexidine 0.12% Liquid 15 milliLiter(s) Oral Mucosa every 12 hours  dextrose 5%. 1000 milliLiter(s) (100 mL/Hr) IV Continuous <Continuous>  dextrose 50% Injectable 25 Gram(s) IV Push once  dextrose 50% Injectable 12.5 Gram(s) IV Push once  dextrose 50% Injectable 25 Gram(s) IV Push once  DOBUTamine Infusion 5 MICROgram(s)/kG/Min (12.2 mL/Hr) IV Continuous <Continuous>  glucagon  Injectable 1 milliGRAM(s) IntraMuscular once  heparin   Injectable 5000 Unit(s) SubCutaneous every 8 hours  insulin lispro (ADMELOG) corrective regimen sliding scale   SubCutaneous every 6 hours  ipratropium 17 MICROgram(s) HFA Inhaler 1 Puff(s) Inhalation every 6 hours  nystatin Powder 1 Application(s) Topical two times a day  pantoprazole  Injectable 40 milliGRAM(s) IV Push daily  propofol Infusion 10 MICROgram(s)/kG/Min (4.9 mL/Hr) IV Continuous <Continuous>    MEDICATIONS  (PRN):  dextrose Oral Gel 15 Gram(s) Oral once PRN Blood Glucose LESS THAN 70 milliGRAM(s)/deciliter  glycopyrrolate Injectable 0.2 milliGRAM(s) IV Push every 6 hours PRN for secretions         Vitals log        ICU Vital Signs Last 24 Hrs  T(C): 37.1 (16 Jan 2023 02:00), Max: 37.1 (16 Jan 2023 02:00)  T(F): 98.8 (16 Jan 2023 02:00), Max: 98.8 (16 Jan 2023 02:00)  HR: 104 (16 Jan 2023 05:53) (80 - 130)  BP: 126/84 (16 Jan 2023 05:00) (103/67 - 129/87)  BP(mean): 95 (16 Jan 2023 05:00) (78 - 100)  ABP: --  ABP(mean): --  RR: 18 (16 Jan 2023 05:00) (14 - 39)  SpO2: 96% (16 Jan 2023 05:53) (94% - 100%)    O2 Parameters below as of 16 Jan 2023 00:55  Patient On (Oxygen Delivery Method): ventilator             Mode: AC/ CMV (Assist Control/ Continuous Mandatory Ventilation)  RR (machine): 20  TV (machine): 400  FiO2: 35  PEEP: 5  ITime: 1  MAP: 9  PIP: 17      Input and Output:  I&O's Detail    14 Jan 2023 07:01  -  15 David 2023 07:00  --------------------------------------------------------  IN:    DOBUTamine: 292.8 mL    Enteral Tube Flush: 750 mL    Nepro with Carb Steady: 960 mL    Propofol: 117.6 mL  Total IN: 2120.4 mL    OUT:    Chest Tube (mL): 0 mL    Chest Tube (mL): 0 mL    Indwelling Catheter - Urethral (mL): 1200 mL    Rectal Tube (mL): 400 mL  Total OUT: 1600 mL    Total NET: 520.4 mL      15 David 2023 07:01  -  16 Jan 2023 06:20  --------------------------------------------------------  IN:    DOBUTamine: 353.8 mL    Enteral Tube Flush: 750 mL    Glucerna 1.5: 40 mL    Nepro with Carb Steady: 880 mL    Propofol: 225 mL  Total IN: 2248.8 mL    OUT:    Chest Tube (mL): 0 mL    Chest Tube (mL): 0 mL    Indwelling Catheter - Urethral (mL): 1100 mL  Total OUT: 1100 mL    Total NET: 1148.8 mL          Lab Data                        10.3   9.83  )-----------( 264      ( 15 David 2023 12:28 )             32.8     01-15    141  |  104  |  131<H>  ----------------------------<  124<H>  3.9   |  24  |  4.19<H>    Ca    9.3      15 David 2023 12:35              Review of Systems	      Objective     Physical Examination    heart s1s2  lung dec BS  head nc      Pertinent Lab findings & Imaging      Leo:  NO   Adequate UO     I&O's Detail    14 Jan 2023 07:01  -  15 David 2023 07:00  --------------------------------------------------------  IN:    DOBUTamine: 292.8 mL    Enteral Tube Flush: 750 mL    Nepro with Carb Steady: 960 mL    Propofol: 117.6 mL  Total IN: 2120.4 mL    OUT:    Chest Tube (mL): 0 mL    Chest Tube (mL): 0 mL    Indwelling Catheter - Urethral (mL): 1200 mL    Rectal Tube (mL): 400 mL  Total OUT: 1600 mL    Total NET: 520.4 mL      15 David 2023 07:01  -  16 Jan 2023 06:20  --------------------------------------------------------  IN:    DOBUTamine: 353.8 mL    Enteral Tube Flush: 750 mL    Glucerna 1.5: 40 mL    Nepro with Carb Steady: 880 mL    Propofol: 225 mL  Total IN: 2248.8 mL    OUT:    Chest Tube (mL): 0 mL    Chest Tube (mL): 0 mL    Indwelling Catheter - Urethral (mL): 1100 mL  Total OUT: 1100 mL    Total NET: 1148.8 mL               Discussed with:     Cultures:	        Radiology

## 2023-01-16 NOTE — PROGRESS NOTE ADULT - SUBJECTIVE AND OBJECTIVE BOX
Patient is a 62y old  Male who presents with a chief complaint of resp failure (26 Dec 2022 09:47)    HPI: 62M CAD, Dilated cardiomyopathy, S/p AICD, Afib/flutter, h/o substance abuse, CKD baseline creat approx 1.4 last admitted to [facility] with MSSA bacteremia, and CHF.  Sent from [facility] with acute hypoxic respiratory failure.  Was initially on BiPAP then became hypoxic.  Anesthesia intubated patient.  On post intubation Xray pneumothorax evident.  Patient with PEA arrest.  Chest tube placed by ED physician.  ROSC obtained.     Patient unable to give further history.      Intetrval history -     Intubated on vent.   Responsive to stimuli  Doesn't make eye contact.    MEDICATIONS  (STANDING):    albuterol    90 MICROgram(s) HFA Inhaler 2 Puff(s) Inhalation every 6 hours  aMIOdarone    Tablet 200 milliGRAM(s) Oral daily  aspirin  chewable 81 milliGRAM(s) Oral daily  chlorhexidine 0.12% Liquid 15 milliLiter(s) Oral Mucosa every 12 hours  dextrose 5%. 1000 milliLiter(s) (100 mL/Hr) IV Continuous <Continuous>  dextrose 50% Injectable 25 Gram(s) IV Push once  dextrose 50% Injectable 12.5 Gram(s) IV Push once  dextrose 50% Injectable 25 Gram(s) IV Push once  DOBUTamine Infusion 2.5 MICROgram(s)/kG/Min (6.12 mL/Hr) IV Continuous <Continuous>  glucagon  Injectable 1 milliGRAM(s) IntraMuscular once  heparin   Injectable 5000 Unit(s) SubCutaneous every 8 hours  insulin lispro (ADMELOG) corrective regimen sliding scale   SubCutaneous every 6 hours  ipratropium 17 MICROgram(s) HFA Inhaler 1 Puff(s) Inhalation every 6 hours  nystatin Powder 1 Application(s) Topical two times a day  pantoprazole  Injectable 40 milliGRAM(s) IV Push daily  propofol Infusion 10 MICROgram(s)/kG/Min (4.9 mL/Hr) IV Continuous <Continuous>    MEDICATIONS  (PRN):  dextrose Oral Gel 15 Gram(s) Oral once PRN Blood Glucose LESS THAN 70 milliGRAM(s)/deciliter  glycopyrrolate Injectable 0.2 milliGRAM(s) IV Push every 6 hours PRN for secretions    Allergies    No Known Allergies    Intolerances    pork (Other)    REVIEW OF SYSTEMS: UTO    PHYSICAL EXAM:    Vital Signs Last 24 Hrs    T(C): 36.4 (16 Jan 2023 08:39), Max: 37.1 (16 Jan 2023 02:00)  T(F): 97.5 (16 Jan 2023 08:39), Max: 98.8 (16 Jan 2023 02:00)  HR: 112 (16 Jan 2023 11:00) (80 - 130)  BP: 113/81 (16 Jan 2023 11:00) (103/67 - 126/84)  BP(mean): 91 (16 Jan 2023 11:00) (78 - 100)  RR: 18 (16 Jan 2023 11:00) (14 - 39)  SpO2: 100% (16 Jan 2023 11:00) (94% - 100%)    Parameters below as of 16 Jan 2023 11:00  Patient On (Oxygen Delivery Method): ventilator    O2 Concentration (%): 35    On Neurological Examination:    Intubated on vent.  On sedation.  Opens eyes on stimulation. Doesn't make eye contact. Not able to visually track.  Pupils are 3 mm, sluggishly reacting to light.  Neck is supple.  No abn body movements.    LABS:    CBC Full  -  ( 15 David 2023 12:28 )  WBC Count : 9.83 K/uL  RBC Count : 3.48 M/uL  Hemoglobin : 10.3 g/dL  Hematocrit : 32.8 %  Platelet Count - Automated : 264 K/uL  Mean Cell Volume : 94.3 fl  Mean Cell Hemoglobin : 29.6 pg  Mean Cell Hemoglobin Concentration : 31.4 gm/dL    01-16    141  |  106  |  129<H>  ----------------------------<  108<H>  4.0   |  22  |  3.88<H>    Ca    8.8      16 Jan 2023 06:00  Phos  5.5     01-16  Mg     3.6     01-16    TPro  8.1  /  Alb  1.9<L>  /  TBili  0.7  /  DBili  x   /  AST  63<H>  /  ALT  36  /  AlkPhos  273<H>  01-16    RADIOLOGY & ADDITIONAL STUDIES:    < from: TTE Echo Complete w/o Contrast w/ Doppler (02.13.22 @ 09:00) >    1. Severe left ventricular systolic dysfunction. EF 10 %  2. Dilated left ventricle, left atrium, right atrium.  3. Moderate mitral regurgitation.  4. Moderate tricuspid regurgitation.  5. Mild pulmonary hypertension.  6. No evidence of endocarditis on this transthoracic study.    < end of copied text >    r< from: CT Head No Cont (12.29.22 @ 13:20) >    IMPRESSION: No acute intracranial hemorrhage, mass effect, or shift of the midline structures.    Similar-appearing mild chronic white matter microvascular type changes.    < end of copied text >    < from: CT Head No Cont (12.23.22 @ 21:18) >    IMPRESSION:    No large territory acute infarct, intracranial hemorrhage, or mass effect.    Slight progression of chronic microangiopathic white matter changes as compared to prior study from 2016.    < end of copied text >    < from: CT Chest No Cont (12.23.22 @ 21:19) >    IMPRESSION:    *  Left chest tube with residual small left pneumothorax. No evidence of tension.  *  Right basilar rounded atelectasis again noted with chronic small loculated right pleural effusion.  *  Small pneumomediastinum. Left chest and abdominal wall emphysema. Moderate pneumoretroperitoneum and properitoneal air. No pneumoperitoneum. Findings are compatible with barotrauma.  *  Evidence of urinary bladder cystitis.    < end of copied text >

## 2023-01-16 NOTE — PROGRESS NOTE ADULT - SUBJECTIVE AND OBJECTIVE BOX
62M hx  HTN, DCM with ICD , CKD, Opioid/ Substance Abuse, AFib/Flutter      Here since 12/23 with resp distress and a rashmi-intubation CPA with tension PTX s/p 2 chest tubes.     DERRICK on CKD which is non oliguric.      On low dose inotrope.    Off ABX spiked temp tonight.    Low EF < 20%   rx'ing for LCOS with dobutamine.      Taper dobutamine off as able by BP and UOP.    His MV on AC is high doubt he will be able tolerate a SBT or be extubateable.     He is on some sedation with propofol and he is uremic,  so he is not responsive.  Neuro notes state that the patient was following some commands earlier     If he is able to come off the inotrope, then MDM changes and care needs to shift on ethical principles mainly beneficence.       If he spikes a temp again, will culture  re-instate  ABX.                     I    62M hx  HTN, DCM with ICD , CKD, Opioid/ Substance Abuse, AFib/Flutter      Here since 12/23 with resp distress and a rashmi-intubation CPA with tension PTX s/p 2 chest tubes.     DERRICK on CKD which is non oliguric.      On low dose inotrope.    Off ABX spiked temp tonight.    Low EF < 20%   rx'ing for LCOS with dobutamine.      Taper dobutamine off as able by BP and UOP.    His MV on AC is high doubt he will be able tolerate a SBT or be extubateable.     He is on some sedation with propofol and he is uremic,  so he is not responsive.  Neuro notes state that the patient was following some commands earlier     If he is able to come off the inotrope, then MDM changes and care needs to shift on ethical principles mainly beneficence.       If he spikes a temp again, will culture  re-instate  ABX.             Addendum Now temp 101.8.  D/W Dr Phan, will resume ABX and pan culture.        I

## 2023-01-16 NOTE — PROGRESS NOTE ADULT - SUBJECTIVE AND OBJECTIVE BOX
DOROTHY VOSS    [facility]    Allergies    No Known Allergies    Intolerances    pork (Other)      PAST MEDICAL & SURGICAL HISTORY:  Heart failure, systolic      CAD (coronary artery disease)      Hypertension      Nonischemic cardiomyopathy      COPD, moderate      2019 novel coronavirus disease (COVID-19)      Substance abuse      HLD (hyperlipidemia)      Cardiac LV ejection fraction 10-20%      AICD (automatic cardioverter/defibrillator) present      Cardiac LV ejection fraction 10-20%          FAMILY HISTORY:  FH: lung cancer        Home Medications:  acetaminophen 325 mg oral tablet: 2 tab(s) orally every 6 hours, As needed, Temp greater or equal to 38C (100.4F), Mild Pain (1 - 3) (23 Dec 2022 10:07)  apixaban 5 mg oral tablet: 1 tab(s) orally every 12 hours (23 Dec 2022 10:07)  Aquaphor Healing topical ointment: Apply topically to affected area once a day (23 Dec 2022 10:07)  ascorbic acid 500 mg oral tablet: 1 tab(s) orally once a day (23 Dec 2022 10:07)  Bacid (LAC) oral capsule: 2 cap(s) orally once a day (23 Dec 2022 10:07)  bumetanide 2 mg oral tablet: 1 tab(s) orally 2 times a day (23 Dec 2022 10:07)  gabapentin 100 mg oral capsule: 1 cap(s) orally 3 times a day (23 Dec 2022 10:07)  melatonin 3 mg oral tablet: 1 tab(s) orally once a day (at bedtime), As needed, Insomnia (23 Dec 2022 10:07)  Multiple Vitamins with Minerals oral tablet: 1 tab(s) orally once a day (23 Dec 2022 10:07)  pantoprazole 40 mg oral delayed release tablet: 1 tab(s) orally once a day (before a meal) (23 Dec 2022 10:07)  sacubitril-valsartan 24 mg-26 mg oral tablet: 1 tab(s) orally 2 times a day (23 Dec 2022 10:07)  simethicone 80 mg oral tablet: 2 tab(s) orally every 6 hours, As Needed (23 Dec 2022 10:07)  spironolactone 25 mg oral tablet: 1 tab(s) orally once a day (23 Dec 2022 10:07)  Symbicort 160 mcg-4.5 mcg/inh inhalation aerosol: 2 puff(s) inhaled 2 times a day (23 Dec 2022 10:07)  Ventolin HFA 90 mcg/inh inhalation aerosol: 2 puff(s) inhaled every 6 hours, As Needed (23 Dec 2022 10:07)      MEDICATIONS  (STANDING):  albuterol    90 MICROgram(s) HFA Inhaler 2 Puff(s) Inhalation every 6 hours  aMIOdarone    Tablet 200 milliGRAM(s) Oral daily  aspirin  chewable 81 milliGRAM(s) Oral daily  chlorhexidine 0.12% Liquid 15 milliLiter(s) Oral Mucosa every 12 hours  dextrose 5%. 1000 milliLiter(s) (100 mL/Hr) IV Continuous <Continuous>  dextrose 50% Injectable 25 Gram(s) IV Push once  dextrose 50% Injectable 12.5 Gram(s) IV Push once  dextrose 50% Injectable 25 Gram(s) IV Push once  DOBUTamine Infusion 2.5 MICROgram(s)/kG/Min (6.12 mL/Hr) IV Continuous <Continuous>  glucagon  Injectable 1 milliGRAM(s) IntraMuscular once  heparin   Injectable 5000 Unit(s) SubCutaneous every 8 hours  insulin lispro (ADMELOG) corrective regimen sliding scale   SubCutaneous every 6 hours  ipratropium 17 MICROgram(s) HFA Inhaler 1 Puff(s) Inhalation every 6 hours  nystatin Powder 1 Application(s) Topical two times a day  pantoprazole  Injectable 40 milliGRAM(s) IV Push daily  propofol Infusion 10 MICROgram(s)/kG/Min (4.9 mL/Hr) IV Continuous <Continuous>    MEDICATIONS  (PRN):  dextrose Oral Gel 15 Gram(s) Oral once PRN Blood Glucose LESS THAN 70 milliGRAM(s)/deciliter  glycopyrrolate Injectable 0.2 milliGRAM(s) IV Push every 6 hours PRN for secretions      Diet, NPO with Tube Feed:   Tube Feeding Modality: Nasogastric  Nepro with Carb Steady  Total Volume for 24 Hours (mL): 960  Continuous  Starting Tube Feed Rate mL per Hour: 20  Increase Tube Feed Rate by (mL): 10     Every 4 hours  Until Goal Tube Feed Rate (mL per Hour): 40  Tube Feed Duration (in Hours): 24  Tube Feed Start Time: 13:00  Free Water Flush  Pump   Rate (mL per Hour): 30 (12-26-22 @ 23:12) [Active]          Vital Signs Last 24 Hrs  T(C): 36.6 (16 Jan 2023 12:30), Max: 37.1 (16 Jan 2023 02:00)  T(F): 97.9 (16 Jan 2023 12:30), Max: 98.8 (16 Jan 2023 02:00)  HR: 112 (16 Jan 2023 11:00) (85 - 130)  BP: 113/81 (16 Jan 2023 11:00) (103/67 - 126/84)  BP(mean): 91 (16 Jan 2023 11:00) (78 - 100)  RR: 18 (16 Jan 2023 11:00) (14 - 39)  SpO2: 100% (16 Jan 2023 11:00) (94% - 100%)    Parameters below as of 16 Jan 2023 11:00  Patient On (Oxygen Delivery Method): ventilator    O2 Concentration (%): 35      01-15-23 @ 07:01  -  01-16-23 @ 07:00  --------------------------------------------------------  IN: 2367.9 mL / OUT: 1700 mL / NET: 667.9 mL        Mode: AC/ CMV (Assist Control/ Continuous Mandatory Ventilation), RR (machine): 20, TV (machine): 400, FiO2: 35, PEEP: 5, ITime: 1, MAP: 10, PIP: 19      LABS:                        10.3   9.83  )-----------( 264      ( 15 David 2023 12:28 )             32.8     01-16    141  |  106  |  129<H>  ----------------------------<  108<H>  4.0   |  22  |  3.88<H>    Ca    8.8      16 Jan 2023 06:00  Phos  5.5     01-16  Mg     3.6     01-16    TPro  8.1  /  Alb  1.9<L>  /  TBili  0.7  /  DBili  x   /  AST  63<H>  /  ALT  36  /  AlkPhos  273<H>  01-16              WBC:  WBC Count: 9.83 K/uL (01-15 @ 12:28)  WBC Count: 11.05 K/uL (01-14 @ 06:00)  WBC Count: 11.43 K/uL (01-13 @ 06:00)      MICROBIOLOGY:  RECENT CULTURES:                  Sodium:  Sodium, Serum: 141 mmol/L (01-16 @ 06:00)  Sodium, Serum: 141 mmol/L (01-15 @ 12:35)  Sodium, Serum: 142 mmol/L (01-14 @ 06:00)  Sodium, Serum: 142 mmol/L (01-13 @ 06:00)      3.88 mg/dL 01-16 @ 06:00  4.19 mg/dL 01-15 @ 12:35  4.69 mg/dL 01-14 @ 06:00  4.85 mg/dL 01-13 @ 06:00      Hemoglobin:  Hemoglobin: 10.3 g/dL (01-15 @ 12:28)  Hemoglobin: 10.8 g/dL (01-14 @ 06:00)  Hemoglobin: 9.7 g/dL (01-13 @ 06:00)      Platelets: Platelet Count - Automated: 264 K/uL (01-15 @ 12:28)  Platelet Count - Automated: 221 K/uL (01-14 @ 06:00)  Platelet Count - Automated: 219 K/uL (01-13 @ 06:00)      LIVER FUNCTIONS - ( 16 Jan 2023 06:00 )  Alb: 1.9 g/dL / Pro: 8.1 g/dL / ALK PHOS: 273 U/L / ALT: 36 U/L DA / AST: 63 U/L / GGT: x                 RADIOLOGY & ADDITIONAL STUDIES:      MICROBIOLOGY:  RECENT CULTURES:

## 2023-01-16 NOTE — PROGRESS NOTE ADULT - ASSESSMENT
a/p resp failure  ftt    plan  in an dout  may need peg to be placed  gerd precautions  continue ngt   check residuals  ppi once a day    Advanced care planning was discussed with patient and family.  Advanced care planning forms were reviewed and discussed.  Risks, benefits and alternatives of gastroenterologic procedures were discussed in detail and all questions were answered.    30 minutes spent.

## 2023-01-16 NOTE — PROGRESS NOTE ADULT - ASSESSMENT
REVIEW OF SYMPTOMS      Able to give (reliable) ROS  NO     PHYSICAL EXAM    HEENT Unremarkable  atraumatic   RESP Fair air entry EXP prolonged    Harsh breath sound Resp distres mild   CARDIAC S1 S2 No S3     NO JVD    ABDOMEN SOFT BS PRESENT NOT DISTENDED No hepatosplenomegaly   PEDAL EDEMA present No calf tenderness  NO rash       GENERAL DATA .   GOC.   12/23/2022 full code  ALLGY.     nka                  WT.     12/24/2022 81           BMI.       12/24/2022 26          ICU STAY. .. 12/23/2022  COVID. ..  12/23/2022 scv2 (-)   BEST PRACTICE ISSUES.    HOB ELEVATN. Yes  DVT PPLX. ..    12/23/2022 hpsc   WHITMORE PPLX. ..    12/23/2022 protonix 40   INFN PPLX. ..  12/23/2022 chlorhexidine .12%   SP SW VIVIAN.         DIET.  .. 12/26 nepro 960 ng    IV fl...  FREE WATER 1/9/2023 free water 250.3   PROCEDURES...   12/23/2022  l chest tube   12/23/2022 intubated     ABGS.  1/6/2023 vent 30% 739/28/65     VS/ PO/IO/ VENT/ DRIPS.  1/16/2023 101f 110 120/80   1/16/2023 7p dobu 2.5 m/k/m   1/16/2023 7p prop 10 m/k/m   1/16/2023 ac 20/400/5/.35     AGE doa cc.  62 m doa 12/23/2022 resp distress    PREV ADMISSION 2/11-2/25/2022 [facility]    PMH  PMH COPD  PMH COVID (+) 2/11/2022   PMH S aureus bacteremia mssa 2/11   .. Ancef 2/12/2022 Dr Phan  PM AICD  PMH HFREF  .. ECHO 11/20/2021 ef 20%  PMH A fib (on eliquis)     PROBLEMS ASSESSMENT RECOMMENDATIONS.  Intubation 12/23/2022  VENT   .. bundle dsv dsbt ltvv pplat 30 (-) PO2 60 (+) ph 7.3 (+)  .. lung protective ventilatn   .. wean as told if fails then trach  .. failing cpap so far  PROLONGED INTUBATN.  .. Plan trach vs GOC determination   WEANING.  .. 1/8/2023 dw resp pt has lot of secretions   SEDATION.  .. dexm 12/30-1/6  .. prop 1/6 -->   .. target rass 0 to (-) 1   HEMOPTYSIS   .. 1/3 given ddavp 25  .. 5 d meropenem started 1/3   .. 1/5/2023 no further hemoptysis   INFECTION.  .. w 1/6-1/8-1/9-1/11/2023 w 19  - 14- 16- 11.9  .. 1/6/2023 Has leukocytosis   .. 1/3 meroipenem restarted as hemoptysis leukocytosis   FEVER 1/16/2023   .. w 1/15/2023 w 9.8   .. 1/16/2023 ID on case   Anemia.  .. Hb 1/8-1/15/2023 Hb 10 - 10   .. target hb 7 (+)   DERRICK   .. Cr 1/2-1/8-1/9-1/11-1/15/2023 Cr 5.1 - 3.3- 3.4-3.8- 4   .. Creat improving   CHEST TUBE   .. 2nd chest tube placed on 1/3 because of worsening pntx   .. to be removed after trach or extubation which ever comes first   CHF.  .. On dobu started 1/3-->   OVERALL.  .. Trach if unweanable   .. Taper off dobu as guided by Dr Zapien   .. renal failure improving   .. complete meropenem   .. goc determination in progress   .. Fever 1/16/2023 to follow with id        TIME SPENT   Over 39 minutes aggregate critical care time spent on encounter; activities included   direct patient care, counseling and/or coordinating care reviewing notes, lab data/ imaging , discussion with multidisciplinary team/ patient  /family and explaining in detail risks, benefits, alternatives  of the recommendations     BIPIN DE LA CRUZ 61 m 12/23/2022 1962 DR KELVIN VANESSA

## 2023-01-16 NOTE — PROGRESS NOTE ADULT - ASSESSMENT
62M CAD, Dilated cardiomyopathy, S/p AICD, Afib/flutter, h/o substance abuse,  presented with acute hypoxic and hypercarbic respiratory failure associated with pneumothorax.      ptx  resp failure  arnoldo hx  CM  AICD  AF    eICU note reviewed -sedation issues - secretions - tachypnea - robinul added  gi note reviewed    multidisciplinary meeting held on 1/12 -   decisions to be made in the near future for discussion and GOC and care planning

## 2023-01-16 NOTE — PROGRESS NOTE ADULT - ASSESSMENT
61 y/o M CAD, Dilated cardiomyopathy, S/p AICD, Afib/flutter, h/o substance abuse,  presented with acute hypoxic and hypercarbic respiratory failure associated with pneumothorax which led to cardiac arrest       Acute respiratory failure secondary to tension pneumothorax with leading to cardiac arrest   - continue vent management;    - continue chest tubes to suction for now.   - duoneb inhalation   - serial xrays  no escalation of care, continue supporitve care     Aspiration pneumonia /Sputum Cx pseudomonas    completed zosyn x10 days , now off abx    hypernatremia  - resolved    Cardiomyopathy, Acute on chronic systolic CHF   - hold eliquis   - on dobutamine drip per cardiology  - amiodarone    DM2  - FS monitoring with lispro coverage scale while critically ill    DERRICK on CKD  - Likely ATN/ischemic   - monitor creatinine: improved but now increasing again.   - avoid nephrotoxic agents  - per renal : dialysis would likely cause harm, not provide for any meaningful recovery.     Prophylactic measure  - DVT proph: heparin SQ   - GI proph: protonix      Ethics consult appreciated.  Patient is in multiorgan system failure with no overall improvement in prognosis.  no further escalation in care would be appropriate

## 2023-01-16 NOTE — PROGRESS NOTE ADULT - SUBJECTIVE AND OBJECTIVE BOX
Patient is a 62y old  Male who presents with a chief complaint of resp failure (14 Jan 2023 23:35)        HPI:  62M CAD, Dilated cardiomyopathy, S/p AICD, Afib/flutter, h/o substance abuse, CKD baseline creat approx 1.4 last admitted to [facility] with MSSA bacteremia, and CHF.  Sent from [facility] with acute hypoxic respiratory failure.  Was initially on BiPAP then became hypoxic.  Anesthesia intubated patient.  On post intubation Xray pneumothorax evident.  Patient with PEA arrest.  Chest tube placed by ED physician.  ROSC obtained.     Patient unable to give further history.     (23 Dec 2022 13:02)      SUBJECTIVE & OBJECTIVE: Pt seen and examined at bedside.   no overnight events     ROS: unable to examine due to [x ] Encephalopathy  [ ] Advanced Dementia  [ ] Expressive Aphasia  [ ] Non-verbal patient [x ] vent    PHYSICAL EXAM:  Vital Signs Last 24 Hrs  T(C): 36.6 (16 Jan 2023 12:30), Max: 37.1 (16 Jan 2023 02:00)  T(F): 97.9 (16 Jan 2023 12:30), Max: 98.8 (16 Jan 2023 02:00)  HR: 85 (16 Jan 2023 14:59) (85 - 130)  BP: 113/81 (16 Jan 2023 11:00) (103/67 - 126/84)  BP(mean): 91 (16 Jan 2023 11:00) (78 - 100)  RR: 18 (16 Jan 2023 11:00) (14 - 39)  SpO2: 98% (16 Jan 2023 14:59) (94% - 100%)    Parameters below as of 16 Jan 2023 14:59  Patient On (Oxygen Delivery Method): ventilator      GENERAL: NAD  NERVOUS SYSTEM:  vent/sedated ,  CHEST/LUNG: good b/l air entry   HEART:  +S1/S1   ABDOMEN: Soft, Nontender, Nondistended; Bowel sounds present  EXTREMITIES: diffuse edema , good pulses   +NGT, rectal tube, reed cath, chest tube         MEDICATIONS  (STANDING):  albuterol    90 MICROgram(s) HFA Inhaler 2 Puff(s) Inhalation every 6 hours  albuterol/ipratropium for Nebulization. 3 milliLiter(s) Nebulizer once  aMIOdarone    Tablet 200 milliGRAM(s) Oral daily  aspirin  chewable 81 milliGRAM(s) Oral daily  chlorhexidine 0.12% Liquid 15 milliLiter(s) Oral Mucosa every 12 hours  dextrose 5%. 1000 milliLiter(s) (100 mL/Hr) IV Continuous <Continuous>  dextrose 50% Injectable 25 Gram(s) IV Push once  dextrose 50% Injectable 12.5 Gram(s) IV Push once  dextrose 50% Injectable 25 Gram(s) IV Push once  DOBUTamine Infusion 5 MICROgram(s)/kG/Min (12.2 mL/Hr) IV Continuous <Continuous>  glucagon  Injectable 1 milliGRAM(s) IntraMuscular once  heparin   Injectable 5000 Unit(s) SubCutaneous every 8 hours  insulin lispro (ADMELOG) corrective regimen sliding scale   SubCutaneous every 6 hours  ipratropium 17 MICROgram(s) HFA Inhaler 1 Puff(s) Inhalation every 6 hours  nystatin Powder 1 Application(s) Topical two times a day  pantoprazole  Injectable 40 milliGRAM(s) IV Push daily  propofol Infusion 10 MICROgram(s)/kG/Min (4.9 mL/Hr) IV Continuous <Continuous>    MEDICATIONS  (PRN):  dextrose Oral Gel 15 Gram(s) Oral once PRN Blood Glucose LESS THAN 70 milliGRAM(s)/deciliter  glycopyrrolate Injectable 0.2 milliGRAM(s) IV Push every 6 hours PRN for secretions      LABS:                                   10.3   9.83  )-----------( 264      ( 15 David 2023 12:28 )             32.8   01-16    141  |  106  |  129<H>  ----------------------------<  108<H>  4.0   |  22  |  3.88<H>    Ca    8.8      16 Jan 2023 06:00  Phos  5.5     01-16  Mg     3.6     01-16    TPro  8.1  /  Alb  1.9<L>  /  TBili  0.7  /  DBili  x   /  AST  63<H>  /  ALT  36  /  AlkPhos  273<H>  01-16              Consultant(s) Notes Reveiwed [ x] Yes     Care Discussed with [x ] Consultants  [x ] Patient  [ ] Family  [ x] /   [x ] Other; RN

## 2023-01-16 NOTE — PROGRESS NOTE ADULT - ASSESSMENT
62M CAD, Dilated cardiomyopathy, S/p AICD, Afib/flutter, h/o substance abuse, CKD baseline creat approx 1.4 last admitted to [facility] with MSSA bacteremia, and CHF.  Sent from [facility] with acute hypoxic respiratory failure/PEA arrest.  Initial CT Head - No acute pathology.  Repeat CT head 12/29 - : No acute intracranial hemorrhage, mass effect, or shift of the midline structures. Similar-appearing mild chronic white matter microvascular type changes.  Pt seem to follow some commands when not seadated. Moes left hand and foot on command on occasion. No movements seen on right side.  MRI Brain when feasible.  HbA1c - 6.4 on 12/24  LDL - 98  - Has Elevated LFTs - Lipitor will not be used at this point.  NIHSS - can't be accessed accurately.  On ASA 81  Renal failure. BUN/Cr - 129/3.88 now  Elevated LFTs.  Severe left ventricular systolic dysfunction. EF 10 %  Pt with multi organ failure. Poor overall prognosis.  Would follow.

## 2023-01-16 NOTE — PROGRESS NOTE ADULT - ASSESSMENT
The patient is a 62 year old male with a history of CAD, chronic systolic heart failure s/p ICD, atrial flutter s/p DCCV, substance abuse, CKD who is admitted with respiratory failure in the setting of tension pneumothorax complicated by cardiac arrest.    Plan:  - ECG with sinus tachycardia and known LBBB  - Echo 2/22 with severely reduced LV (EF 10%) and RV function, mod MR, mod TR, mild pulm HTN  - Not on beta blocker as the patient is on dobutamine. Not a candidate for ACEI/ARB/ARNI at the current time due to renal function.  - Poor renal function - hold apixaban  - Hold bumetanide and spironolactone due to DERRICK  - Continue amiodarone 200 mg daily  - Continue aspirin 81 mg daily  - Mechanical ventilation  - ICU care  - Overall poor prognosis. Comfort care would be most appropriate. GOC remain undecided at this time. Will lower dobutamine to 2.5 mcg/kg/min with plans to turn off tomorrow if renal function remains stable.

## 2023-01-16 NOTE — PROGRESS NOTE ADULT - ASSESSMENT
DERRICK/CKD 3 (baseline creatinine 1.3-1.4)  Hemodynamic/ischemic ATN  Acute respiratory failure, s/p PEA arrest  Intubated in ICU  S/p large L tension pneumothorax  CM, chronic systolic heart failure    Improving renal function. Good UO Continue ICU, supportive care. Avoid nephrotoxic meds as possible.   Avoid ACEI, ARB, NSAIDs and IV contrast. Overall prognosis is poor

## 2023-01-17 NOTE — PROGRESS NOTE ADULT - SUBJECTIVE AND OBJECTIVE BOX
INTERVAL HPI/OVERNIGHT EVENTS:  No new overnight event.  No N/V/D.  Tolerating diet.    Allergies    No Known Allergies    Intolerances    pork (Other)    General:  No wt loss, fevers, chills, night sweats, fatigue,   Eyes:  Good vision, no reported pain  ENT:  No sore throat, pain, runny nose, dysphagia  CV:  No pain, palpitations, hypo/hypertension  Resp:  No dyspnea, cough, tachypnea, wheezing  GI:  No pain, No nausea, No vomiting, No diarrhea, No constipation, No weight loss, No fever, No pruritis, No rectal bleeding, No tarry stools, No dysphagia,  :  No pain, bleeding, incontinence, nocturia  Muscle:  No pain, weakness  Neuro:  No weakness, tingling, memory problems  Psych:  No fatigue, insomnia, mood problems, depression  Endocrine:  No polyuria, polydipsia, cold/heat intolerance  Heme:  No petechiae, ecchymosis, easy bruisability  Skin:  No rash, tattoos, scars, edema      PHYSICAL EXAM:   Vital Signs:  Vital Signs Last 24 Hrs  T(C): 39.6 (2023 06:00), Max: 39.6 (2023 06:00)  T(F): 103.2 (2023 06:00), Max: 103.2 (2023 06:00)  HR: 90 (2023 07:02) (85 - 116)  BP: 88/66 (2023 07:00) (88/66 - 123/88)  BP(mean): 75 (2023 07:00) (75 - 98)  RR: 27 (2023 07:00) (18 - 29)  SpO2: 95% (2023 07:02) (92% - 100%)    Parameters below as of 2023 07:02  Patient On (Oxygen Delivery Method): ventilator      Daily     Daily Weight in k (2023 06:00)I&O's Summary    2023 07:01  -  2023 07:00  --------------------------------------------------------  IN: 2515.9 mL / OUT: 810 mL / NET: 1705.9 mL        GENERAL:  Appears stated age, well-groomed, well-nourished, no distress  HEENT:  NC/AT,  conjunctivae clear and pink, no thyromegaly, nodules, adenopathy, no JVD, sclera -anicteric  CHEST:  Full & symmetric excursion, no increased effort, breath sounds clear  HEART:  Regular rhythm, S1, S2, no murmur/rub/S3/S4, no abdominal bruit, no edema  ABDOMEN:  Soft, non-tender, non-distended, normoactive bowel sounds,  no masses ,no hepato-splenomegaly, no signs of chronic liver disease  EXTEREMITIES:  no cyanosis,clubbing or edema  SKIN:  No rash/erythema/ecchymoses/petechiae/wounds/abscess/warm/dry  NEURO:  Alert, oriented, no asterixis, no tremor, no encephalopathy      LABS:                        10.3   9.83  )-----------( 264      ( 15 David 2023 12:28 )             32.8         136  |  101  |  142<H>  ----------------------------<  176<H>  5.3   |  20<L>  |  4.46<H>    Ca    9.1      2023 06:19  Phos  7.1       Mg     2.8         TPro  8.1  /  Alb  1.9<L>  /  TBili  0.7  /  DBili  x   /  AST  63<H>  /  ALT  36  /  AlkPhos  273<H>        Urinalysis Basic - ( 2023 22:50 )    Color: Yellow / Appearance: Clear / S.015 / pH: x  Gluc: x / Ketone: Negative  / Bili: Negative / Urobili: Negative mg/dL   Blood: x / Protein: 100 mg/dL / Nitrite: Negative   Leuk Esterase: Negative / RBC: 6-10 /HPF / WBC 3-5   Sq Epi: x / Non Sq Epi: Occasional / Bacteria: Occasional      amylase   lipase  RADIOLOGY & ADDITIONAL TESTS:

## 2023-01-17 NOTE — PROVIDER CONTACT NOTE (CHANGE IN STATUS NOTIFICATION) - ACTION/TREATMENT ORDERED:
Pt reevaluated - ET placed by Dr Liao @ 11:39 - mech vent  NGT F 14 inserted - Tele ICU activated - and evaluated the pt
2L normal saline initiated and midodrine given as prescribed. Levophed ordered.

## 2023-01-17 NOTE — PROVIDER CONTACT NOTE (EICU) - REASON
tachypnea
Bedside team requesting order for Robinul
elert to review CXR
question about management
LAI Landeros
Tension pneumothorax

## 2023-01-17 NOTE — PROGRESS NOTE ADULT - SUBJECTIVE AND OBJECTIVE BOX
CONSULT PURPOSE:    [  ] Counseling regarding advanced directives - Dx code Z71.89  [  ] Counseling regarding goals of care - Dx code Z71.89  [  ] Counseling regarding end of life decision making - Dx code Z51.5  [  ] Decisional conflict – Dx Code Z51.5: Encounter for palliative care  [  ] Encounter for evaluation of ability to make decisions – Dx code Z13.6  [  ] Patient incapable of making informed decisions – Dx code Z53.20  [  ] Procedure not carried out because of patient’s decision Z53.29  [  ] Z66 Do not resuscitate  [  ] Z55 Problems related to education and literacy  [  ] Z91.1 Patient’s noncompliance with medical treatment regimen    CLINICAL SUMMARY:        PROGNOSIS ESTIMATE (survival in days, wks, mos, yrs):    PATIENT DECISION-MAKING CAPACITY:    [  ] Patient has capacity    [  ] Lacks capacity because:    PATIENT AWARE OF Dx:    [  ] Yes    [  ] No    [  ] Unknown    PATIENT AWARE OF PROGNOSIS    [  ] Yes    [  ] No    [  ] Unknown    NAME OF MEDICAL DECISION-MAKER SHOULD PATIENT LOSE CAPACITY:            RELATIONSHIP:                  ROLE:           [  ] Health Care Agent           [  ] Legal Surrogate           CONTACT NUMBERS:           OTHER 'STAKEHOLDERS':    OTHER DECISION-MAKER (i.ei [facility] or Surrogate) Aware of:            Diagnosis:              [  ] Yes              [  ] No             Prognosis:              [  ] Yes              [  ] No    EVIDENCE OF PATIENT"S PREFERENCE of LIFE-SUSTAINING TREATMENT (WRITTEN OR ORAL):    RESUSCITATION STATUS:           DNR:              [  ] Yes              [  ] No                  DNI:              [  ] Yes              [  ] No    DISCUSSION (Date and Timeframe, Patient, HCAs, Providers, Staff)    BIOETHICS ANALYSIS:    CONCLUSION:               CONSULT PURPOSE:    [  ] Counseling regarding advanced directives - Dx code Z71.89  [ x ] Counseling regarding goals of care - Dx code Z71.89  [  ] Counseling regarding end of life decision making - Dx code Z51.5  [ x ] Decisional conflict – Dx Code Z51.5: Encounter for palliative care  [  ] Encounter for evaluation of ability to make decisions – Dx code Z13.6  [ x ] Patient incapable of making informed decisions – Dx code Z53.20  [  ] Procedure not carried out because of patient’s decision Z53.29  [  ] Z66 Do not resuscitate  [  ] Z55 Problems related to education and literacy  [  ] Z91.1 Patient’s noncompliance with medical treatment regimen    SUBJECTIVE AND OBJECTIVE:  INTERVAL HPI/OVERNIGHT EVENTS:    DNR on chart:   Allergies    No Known Allergies    Intolerances    pork (Other)  MEDICATIONS  (STANDING):    MEDICATIONS  (PRN):      ITEMS UNCHECKED ARE NOT PRESENT    PRESENT SYMPTOMS: [ ]Unable to obtain due to poor mentation   Source if other than patient:  [ ]Family   [ ]Team     Pain:  [ ]yes [ ]no  QOL impact -   Location -                    Aggravating factors -  Quality -  Radiation -  Timing-  Severity (0-10 scale):  Minimal acceptable level (0-10 scale):     Dyspnea:                           [ ]Mild [ ]Moderate [ ]Severe  Anxiety:                             [ ]Mild [ ]Moderate [ ]Severe  Fatigue:                             [ ]Mild [ ]Moderate [ ]Severe  Nausea:                             [ ]Mild [ ]Moderate [ ]Severe  Loss of appetite:              [ ]Mild [ ]Moderate [ ]Severe  Constipation:                    [ ]Mild [ ]Moderate [ ]Severe    CPOT:    https://www.[facility].org/getattachment/[email or web address]/Critical-Care-Pain-Observation-Tool-(CPOT)    PAIN AD Score:	  http://geriatrictoolkit.[place].[facility]/cog/painad.pdf (Ctrl + left click to view)    Other Symptoms:  [ ]All other review of systems negative     Palliative Performance Status Version 2:         %      http://npcrc.org/files/news/palliative_performance_scale_ppsv2.pdf  PHYSICAL EXAM:  Vital Signs Last 24 Hrs  T(C): --  T(F): --  HR: 0 (2023 12:55) (0 - 0)  BP: --  BP(mean): --  RR: 20 (2023 12:55) (20 - 20)  SpO2: 77% (2023 12:55) (77% - 77%)    GENERAL: NAD  NERVOUS SYSTEM:  vent/sedated ,  CHEST/LUNG: good b/l air entry   HEART:  +S1/S1   ABDOMEN: Soft, Nontender, Nondistended; Bowel sounds present  EXTREMITIES: diffuse edema , good pulses   +NGT, rectal tube, reed cath, chest tube        I&O's Summary    2023 07:01  -  2023 07:00  --------------------------------------------------------  IN: 80 mL / OUT: 0 mL / NET: 80 mL       LABS:      136  |  101  |  142<H>  ----------------------------<  176<H>  5.3   |  20<L>  |  4.46<H>    Ca    9.1      2023 06:19  Phos  7.1       Mg     2.8             Urinalysis Basic - ( 2023 22:50 )    Color: Yellow / Appearance: Clear / S.015 / pH: x  Gluc: x / Ketone: Negative  / Bili: Negative / Urobili: Negative mg/dL   Blood: x / Protein: 100 mg/dL / Nitrite: Negative   Leuk Esterase: Negative / RBC: 6-10 /HPF / WBC 3-5   Sq Epi: x / Non Sq Epi: Occasional / Bacteria: Occasional      RADIOLOGY & ADDITIONAL STUDIES: reviewed    Protein Calorie Malnutrition Present: [ ]mild [ ]moderate [ ]severe [ ]underweight [ ]morbid obesity  https://www.andeal.org/vault/2213/web/files/ONC/Table_Clinical%20Characteristics%20to%20Document%20Malnutrition-White%20JV%20et%20al%2020.pdf    Height (cm): 175.3 (22 @ 19:04), 182.9 (22 @ 09:20)  Weight (kg): 81.6 (22 @ 19:04), 71.6 (22 @ 21:23)  BMI (kg/m2): 26.6 (22 @ 19:04), 21.4 (22 @ 21:23)    [ ]PPSV2 < or = 30%  [ ]significant weight loss [ ]poor nutritional intake [ ]anasarca    [ ]Artificial Nutrition    REFERRALS:   [ ]Chaplaincy  [ ]Hospice  [ ]Child Life  [ ]Social Work  [ ]Case management [ ]Holistic Therapy     Goals of Care Document:

## 2023-01-17 NOTE — PROGRESS NOTE ADULT - SUBJECTIVE AND OBJECTIVE BOX
Chief Complaint: Respiratory failure    Interval Events: Hypotensive overnight.    Review of Systems:  Unable to obtain    Physical Exam:  Vital Signs Last 24 Hrs  T(C): 39.6 (17 Jan 2023 06:00), Max: 39.6 (17 Jan 2023 06:00)  T(F): 103.2 (17 Jan 2023 06:00), Max: 103.2 (17 Jan 2023 06:00)  HR: 90 (17 Jan 2023 07:02) (85 - 116)  BP: 88/66 (17 Jan 2023 07:00) (88/66 - 123/88)  BP(mean): 75 (17 Jan 2023 07:00) (75 - 98)  RR: 27 (17 Jan 2023 07:00) (18 - 29)  SpO2: 95% (17 Jan 2023 07:02) (92% - 100%)  Parameters below as of 17 Jan 2023 07:02  Patient On (Oxygen Delivery Method): ventilator  General: Sedated  HEENT: Intubated  Neck: No JVD, no carotid bruit  Lungs: CTAB  CV: RRR, nl S1/S2, no M/R/G  Abdomen: S/NT/ND, +BS  Extremities: No LE edema, no cyanosis  Neuro: AAOx0  Skin: No rash    Labs:    01-17    136  |  101  |  142<H>  ----------------------------<  176<H>  5.3   |  20<L>  |  4.46<H>    Ca    9.1      17 Jan 2023 06:19  Phos  7.1     01-17  Mg     2.8     01-17    TPro  8.1  /  Alb  1.9<L>  /  TBili  0.7  /  DBili  x   /  AST  63<H>  /  ALT  36  /  AlkPhos  273<H>  01-16                        10.3   9.83  )-----------( 264      ( 15 David 2023 12:28 )             32.8         ECG/Telemetry: Sinus rhythm

## 2023-01-17 NOTE — PROVIDER CONTACT NOTE (EICU) - RECOMMENDATIONS
consider resuming Levo if no response to fluid bolus ,   IV ABX  continue GOC discussions  possibility of trach and PEG  may need to be addressed consider resuming Levo if no response to fluid bolus ,   IV ABX  continue GOC discussions with Ethics, Pulm and Palliative care  possibility of trach and PEG  may need to be addressed

## 2023-01-17 NOTE — DISCHARGE NOTE FOR THE EXPIRED PATIENT - HOSPITAL COURSE
HPI:  62M CAD, Dilated cardiomyopathy, S/p AICD, Afib/flutter, h/o substance abuse, CKD baseline creat approx 1.4 last admitted to [facility] with MSSA bacteremia, and CHF.  Sent from [facility] SN[facility] with acute hypoxic respiratory failure.  Was initially on BiPAP then became hypoxic.  Anesthesia intubated patient.  On post intubation Xray pneumothorax evident.  Patient with PEA arrest.  Chest tube placed by ED physician.  ROSC obtained.     Patient unable to give further history.     (23 Dec 2022 13:02)    61 y/o M CAD, Dilated cardiomyopathy EF <20%, S/p AICD, Afib/flutter, h/o polysubstance abuse,  presented with acute hypoxic and hypercarbic respiratory failure associated with tension pneumothorax which led to PEA cardiac arrest, s/p chest tube placement in ER.     Acute respiratory failure secondary to tension pneumothorax with leading to cardiac arrest   - \vent management;  high AC on MV and multiple failed SBT trials.   - chest tubes to suction   - duoneb inhalation   - serial xrays    Pt became hypotensive  2 Liter of bolus given, pressor was started    Code blue was called when pulse was lost.  3 rounds of epi given, unable to achieve ROSC  Time of death was called at 12:52 pm     Detail Level: Simple Initiate Treatment: Impoyz 0.025% cream daily to spot treat skin lesions Samples Given: Impoyz Plan: Patient reports she has anxiety due to her being diagnosed with adrenal cancer three years ago. Patient is currently being prescribed anxiety medication given by her primary care. Discussed with patient anxiety could be causing her to pick at her skin. Advised her to be aware and to discontinue picking.\\nRTC 6 weeks.

## 2023-01-17 NOTE — PROGRESS NOTE ADULT - SUBJECTIVE AND OBJECTIVE BOX
Patient is a 62y old  Male who presents with a chief complaint of resp failure (14 Jan 2023 23:35)        HPI:  62M CAD, Dilated cardiomyopathy, S/p AICD, Afib/flutter, h/o substance abuse, CKD baseline creat approx 1.4 last admitted to [facility] with MSSA bacteremia, and CHF.  Sent from [facility]F with acute hypoxic respiratory failure.  Was initially on BiPAP then became hypoxic.  Anesthesia intubated patient.  On post intubation Xray pneumothorax evident.  Patient with PEA arrest.  Chest tube placed by ED physician.  ROSC obtained.     Patient unable to give further history.     (23 Dec 2022 13:02)      SUBJECTIVE & OBJECTIVE: Pt seen and examined at bedside.   patient spiked 101.8 F yesterday evening ,cultures sent and started cefepime by ICU PA     ROS: unable to examine due to [x ] Encephalopathy  [ ] Advanced Dementia  [ ] Expressive Aphasia  [ ] Non-verbal patient [x ] vent    PHYSICAL EXAM:  Vital Signs Last 24 Hrs  T(C): 39.6 (17 Jan 2023 06:00), Max: 39.6 (17 Jan 2023 06:00)  T(F): 103.2 (17 Jan 2023 06:00), Max: 103.2 (17 Jan 2023 06:00)  HR: 90 (17 Jan 2023 07:02) (85 - 116)  BP: 88/66 (17 Jan 2023 07:00) (88/66 - 123/88)  BP(mean): 75 (17 Jan 2023 07:00) (75 - 98)  RR: 27 (17 Jan 2023 07:00) (18 - 29)  SpO2: 95% (17 Jan 2023 07:02) (92% - 100%)    Parameters below as of 17 Jan 2023 07:02  Patient On (Oxygen Delivery Method): ventilator        GENERAL: NAD  NERVOUS SYSTEM:  vent/sedated ,  CHEST/LUNG: good b/l air entry   HEART:  +S1/S1   ABDOMEN: Soft, Nontender, Nondistended; Bowel sounds present  EXTREMITIES: diffuse edema , good pulses   +NGT, rectal tube, reed cath, chest tube         MEDICATIONS  (STANDING):  albuterol    90 MICROgram(s) HFA Inhaler 2 Puff(s) Inhalation every 6 hours  albuterol/ipratropium for Nebulization. 3 milliLiter(s) Nebulizer once  aMIOdarone    Tablet 200 milliGRAM(s) Oral daily  aspirin  chewable 81 milliGRAM(s) Oral daily  chlorhexidine 0.12% Liquid 15 milliLiter(s) Oral Mucosa every 12 hours  dextrose 5%. 1000 milliLiter(s) (100 mL/Hr) IV Continuous <Continuous>  dextrose 50% Injectable 25 Gram(s) IV Push once  dextrose 50% Injectable 12.5 Gram(s) IV Push once  dextrose 50% Injectable 25 Gram(s) IV Push once  DOBUTamine Infusion 5 MICROgram(s)/kG/Min (12.2 mL/Hr) IV Continuous <Continuous>  glucagon  Injectable 1 milliGRAM(s) IntraMuscular once  heparin   Injectable 5000 Unit(s) SubCutaneous every 8 hours  insulin lispro (ADMELOG) corrective regimen sliding scale   SubCutaneous every 6 hours  ipratropium 17 MICROgram(s) HFA Inhaler 1 Puff(s) Inhalation every 6 hours  nystatin Powder 1 Application(s) Topical two times a day  pantoprazole  Injectable 40 milliGRAM(s) IV Push daily  propofol Infusion 10 MICROgram(s)/kG/Min (4.9 mL/Hr) IV Continuous <Continuous>    MEDICATIONS  (PRN):  dextrose Oral Gel 15 Gram(s) Oral once PRN Blood Glucose LESS THAN 70 milliGRAM(s)/deciliter  glycopyrrolate Injectable 0.2 milliGRAM(s) IV Push every 6 hours PRN for secretions      LABS:                                              10.3   9.83  )-----------( 264      ( 15 David 2023 12:28 )             32.8   01-17    136  |  101  |  142<H>  ----------------------------<  176<H>  5.3   |  20<L>  |  4.46<H>    Ca    9.1      17 Jan 2023 06:19  Phos  7.1     01-17  Mg     2.8     01-17    TPro  8.1  /  Alb  1.9<L>  /  TBili  0.7  /  DBili  x   /  AST  63<H>  /  ALT  36  /  AlkPhos  273<H>  01-16          Consultant(s) Notes Reviewed [ x] Yes     Care Discussed with [x ] Consultants  [x ] Patient  [ ] Family  [ x] /   [x ] Other; RN

## 2023-01-17 NOTE — PROGRESS NOTE ADULT - SUBJECTIVE AND OBJECTIVE BOX
[facility]    Allergies    No Known Allergies    Intolerances    pork (Other)      PAST MEDICAL & SURGICAL HISTORY:  Heart failure, systolic      CAD (coronary artery disease)      Hypertension      Nonischemic cardiomyopathy      COPD, moderate      2019 novel coronavirus disease (COVID-19)      Substance abuse      HLD (hyperlipidemia)      Cardiac LV ejection fraction 10-20%      AICD (automatic cardioverter/defibrillator) present      Cardiac LV ejection fraction 10-20%          FAMILY HISTORY:  FH: lung cancer        Home Medications:  acetaminophen 325 mg oral tablet: 2 tab(s) orally every 6 hours, As needed, Temp greater or equal to 38C (100.4F), Mild Pain (1 - 3) (23 Dec 2022 10:07)  apixaban 5 mg oral tablet: 1 tab(s) orally every 12 hours (23 Dec 2022 10:07)  Aquaphor Healing topical ointment: Apply topically to affected area once a day (23 Dec 2022 10:07)  ascorbic acid 500 mg oral tablet: 1 tab(s) orally once a day (23 Dec 2022 10:07)  Bacid (LAC) oral capsule: 2 cap(s) orally once a day (23 Dec 2022 10:07)  bumetanide 2 mg oral tablet: 1 tab(s) orally 2 times a day (23 Dec 2022 10:07)  gabapentin 100 mg oral capsule: 1 cap(s) orally 3 times a day (23 Dec 2022 10:07)  melatonin 3 mg oral tablet: 1 tab(s) orally once a day (at bedtime), As needed, Insomnia (23 Dec 2022 10:07)  Multiple Vitamins with Minerals oral tablet: 1 tab(s) orally once a day (23 Dec 2022 10:07)  pantoprazole 40 mg oral delayed release tablet: 1 tab(s) orally once a day (before a meal) (23 Dec 2022 10:07)  sacubitril-valsartan 24 mg-26 mg oral tablet: 1 tab(s) orally 2 times a day (23 Dec 2022 10:07)  simethicone 80 mg oral tablet: 2 tab(s) orally every 6 hours, As Needed (23 Dec 2022 10:07)  spironolactone 25 mg oral tablet: 1 tab(s) orally once a day (23 Dec 2022 10:07)  Symbicort 160 mcg-4.5 mcg/inh inhalation aerosol: 2 puff(s) inhaled 2 times a day (23 Dec 2022 10:07)  Ventolin HFA 90 mcg/inh inhalation aerosol: 2 puff(s) inhaled every 6 hours, As Needed (23 Dec 2022 10:07)      MEDICATIONS  (STANDING):  albuterol    90 MICROgram(s) HFA Inhaler 2 Puff(s) Inhalation every 6 hours  aMIOdarone    Tablet 200 milliGRAM(s) Oral daily  aspirin  chewable 81 milliGRAM(s) Oral daily  cefepime   IVPB      cefepime   IVPB 1000 milliGRAM(s) IV Intermittent every 24 hours  chlorhexidine 0.12% Liquid 15 milliLiter(s) Oral Mucosa every 12 hours  dextrose 5%. 1000 milliLiter(s) (100 mL/Hr) IV Continuous <Continuous>  dextrose 50% Injectable 25 Gram(s) IV Push once  dextrose 50% Injectable 12.5 Gram(s) IV Push once  dextrose 50% Injectable 25 Gram(s) IV Push once  DOBUTamine Infusion 2.5 MICROgram(s)/kG/Min (6.12 mL/Hr) IV Continuous <Continuous>  glucagon  Injectable 1 milliGRAM(s) IntraMuscular once  heparin   Injectable 5000 Unit(s) SubCutaneous every 8 hours  insulin lispro (ADMELOG) corrective regimen sliding scale   SubCutaneous every 6 hours  ipratropium 17 MICROgram(s) HFA Inhaler 1 Puff(s) Inhalation every 6 hours  midodrine 10 milliGRAM(s) Oral every 8 hours  nystatin Powder 1 Application(s) Topical two times a day  pantoprazole  Injectable 40 milliGRAM(s) IV Push daily  propofol Infusion 10 MICROgram(s)/kG/Min (4.9 mL/Hr) IV Continuous <Continuous>    MEDICATIONS  (PRN):  acetaminophen    Suspension .. 650 milliGRAM(s) Enteral Tube every 6 hours PRN Temp greater or equal to 38C (100.4F)  dextrose Oral Gel 15 Gram(s) Oral once PRN Blood Glucose LESS THAN 70 milliGRAM(s)/deciliter  glycopyrrolate Injectable 0.2 milliGRAM(s) IV Push every 6 hours PRN for secretions      Diet, NPO with Tube Feed:   Tube Feeding Modality: Nasogastric  Nepro with Carb Steady  Total Volume for 24 Hours (mL): 960  Continuous  Starting Tube Feed Rate mL per Hour: 20  Increase Tube Feed Rate by (mL): 10     Every 4 hours  Until Goal Tube Feed Rate (mL per Hour): 40  Tube Feed Duration (in Hours): 24  Tube Feed Start Time: 13:00  Free Water Flush  Pump   Rate (mL per Hour): 30 (22 @ 23:12) [Active]          Vital Signs Last 24 Hrs  T(C): 39.6 (2023 06:00), Max: 39.6 (2023 06:00)  T(F): 103.2 (2023 06:00), Max: 103.2 (2023 06:00)  HR: 89 (2023 09:00) (85 - 116)  BP: 83/66 (2023 09:00) (83/66 - 123/88)  BP(mean): 73 (2023 09:00) (72 - 98)  RR: 27 (2023 09:00) (18 - 29)  SpO2: 95% (2023 09:00) (92% - 100%)    Parameters below as of 2023 07:02  Patient On (Oxygen Delivery Method): ventilator          23 @ 07:01  -  23 @ 07:00  --------------------------------------------------------  IN: 2515.9 mL / OUT: 810 mL / NET: 1705.9 mL        Mode: AC/ CMV (Assist Control/ Continuous Mandatory Ventilation), RR (machine): 20, TV (machine): 400, FiO2: 35, PEEP: 5, ITime: 1, MAP: 10, PIP: 26      LABS:                        10.3   9.83  )-----------( 264      ( 15 David 2023 12:28 )             32.8     17    136  |  101  |  142<H>  ----------------------------<  176<H>  5.3   |  20<L>  |  4.46<H>    Ca    9.1      2023 06:19  Phos  7.1       Mg     2.8     01-17    TPro  8.1  /  Alb  1.9<L>  /  TBili  0.7  /  DBili  x   /  AST  63<H>  /  ALT  36  /  AlkPhos  273<H>        Urinalysis Basic - ( 2023 22:50 )    Color: Yellow / Appearance: Clear / S.015 / pH: x  Gluc: x / Ketone: Negative  / Bili: Negative / Urobili: Negative mg/dL   Blood: x / Protein: 100 mg/dL / Nitrite: Negative   Leuk Esterase: Negative / RBC: 6-10 /HPF / WBC 3-5   Sq Epi: x / Non Sq Epi: Occasional / Bacteria: Occasional            WBC:  WBC Count: 9.83 K/uL (01-15 @ 12:28)  WBC Count: 11.05 K/uL ( @ 06:00)      MICROBIOLOGY:  RECENT CULTURES:                  Sodium:  Sodium, Serum: 136 mmol/L ( @ 06:19)  Sodium, Serum: 141 mmol/L ( @ 06:00)  Sodium, Serum: 141 mmol/L (01-15 @ 12:35)  Sodium, Serum: 142 mmol/L ( @ 06:00)      4.46 mg/dL  @ 06:19  3.88 mg/dL  @ 06:00  4.19 mg/dL 01-15 @ 12:35  4.69 mg/dL  @ 06:00      Hemoglobin:  Hemoglobin: 10.3 g/dL (01-15 @ 12:28)  Hemoglobin: 10.8 g/dL ( @ 06:00)      Platelets: Platelet Count - Automated: 264 K/uL (01-15 @ 12:28)  Platelet Count - Automated: 221 K/uL ( @ 06:00)      LIVER FUNCTIONS - ( 2023 06:00 )  Alb: 1.9 g/dL / Pro: 8.1 g/dL / ALK PHOS: 273 U/L / ALT: 36 U/L DA / AST: 63 U/L / GGT: x             Urinalysis Basic - ( 2023 22:50 )    Color: Yellow / Appearance: Clear / S.015 / pH: x  Gluc: x / Ketone: Negative  / Bili: Negative / Urobili: Negative mg/dL   Blood: x / Protein: 100 mg/dL / Nitrite: Negative   Leuk Esterase: Negative / RBC: 6-10 /HPF / WBC 3-5   Sq Epi: x / Non Sq Epi: Occasional / Bacteria: Occasional        RADIOLOGY & ADDITIONAL STUDIES:      MICROBIOLOGY:  RECENT CULTURES:

## 2023-01-17 NOTE — PROVIDER CONTACT NOTE (EICU) - BACKGROUND
62 y M with Hx HTN, DCM with ICD, substance abuse - now in ICU since 12/23/2022 after  cardiopulmonary  arrest with large left tension PTX, s/p 2 chest tubes, prolonged resp failure on vent  ( today /20/35 + 5 PEEP),Pseudomonas infection, DERRICK on CRI. 62 y M with Hx HTN, DCM with ICD, substance abuse - now in ICU since 12/23/2022 after  cardiopulmonary  arrest with large left tension PTX, s/p 2 chest tubes  in place, prolonged resp failure on vent  via ETT ( /20/35 + 5 PEEP),Pseudomonas infection, DERRICK on CRI.

## 2023-01-17 NOTE — PROGRESS NOTE ADULT - SUBJECTIVE AND OBJECTIVE BOX
Date/Time Patient Seen:  		  Referring MD:   Data Reviewed	       Patient is a 62y old  Male who presents with a chief complaint of resp failure (16 Jan 2023 20:43)      Subjective/HPI     PAST MEDICAL & SURGICAL HISTORY:  Heart failure, systolic    CAD (coronary artery disease)    Hypertension    Nonischemic cardiomyopathy    COPD, moderate    2019 novel coronavirus disease (COVID-19)    Substance abuse    HLD (hyperlipidemia)    Cardiac LV ejection fraction 10-20%    No significant past surgical history    AICD (automatic cardioverter/defibrillator) present    Cardiac LV ejection fraction 10-20%          Medication list         MEDICATIONS  (STANDING):  albuterol    90 MICROgram(s) HFA Inhaler 2 Puff(s) Inhalation every 6 hours  aMIOdarone    Tablet 200 milliGRAM(s) Oral daily  aspirin  chewable 81 milliGRAM(s) Oral daily  cefepime   IVPB      cefepime   IVPB 1000 milliGRAM(s) IV Intermittent every 24 hours  chlorhexidine 0.12% Liquid 15 milliLiter(s) Oral Mucosa every 12 hours  dextrose 5%. 1000 milliLiter(s) (100 mL/Hr) IV Continuous <Continuous>  dextrose 50% Injectable 25 Gram(s) IV Push once  dextrose 50% Injectable 12.5 Gram(s) IV Push once  dextrose 50% Injectable 25 Gram(s) IV Push once  DOBUTamine Infusion 1.25 MICROgram(s)/kG/Min (3.06 mL/Hr) IV Continuous <Continuous>  glucagon  Injectable 1 milliGRAM(s) IntraMuscular once  heparin   Injectable 5000 Unit(s) SubCutaneous every 8 hours  insulin lispro (ADMELOG) corrective regimen sliding scale   SubCutaneous every 6 hours  ipratropium 17 MICROgram(s) HFA Inhaler 1 Puff(s) Inhalation every 6 hours  nystatin Powder 1 Application(s) Topical two times a day  pantoprazole  Injectable 40 milliGRAM(s) IV Push daily  propofol Infusion 10 MICROgram(s)/kG/Min (4.9 mL/Hr) IV Continuous <Continuous>    MEDICATIONS  (PRN):  acetaminophen    Suspension .. 650 milliGRAM(s) Enteral Tube every 6 hours PRN Temp greater or equal to 38C (100.4F)  dextrose Oral Gel 15 Gram(s) Oral once PRN Blood Glucose LESS THAN 70 milliGRAM(s)/deciliter  glycopyrrolate Injectable 0.2 milliGRAM(s) IV Push every 6 hours PRN for secretions         Vitals log        ICU Vital Signs Last 24 Hrs  T(C): 38.8 (16 Jan 2023 21:00), Max: 38.8 (16 Jan 2023 21:00)  T(F): 101.8 (16 Jan 2023 21:00), Max: 101.8 (16 Jan 2023 21:00)  HR: 89 (17 Jan 2023 05:30) (85 - 116)  BP: 99/68 (17 Jan 2023 05:00) (99/68 - 123/88)  BP(mean): 79 (17 Jan 2023 05:00) (79 - 98)  ABP: --  ABP(mean): --  RR: 28 (17 Jan 2023 05:00) (18 - 29)  SpO2: 92% (17 Jan 2023 05:30) (92% - 100%)    O2 Parameters below as of 17 Jan 2023 01:30  Patient On (Oxygen Delivery Method): ventilator,35%             Mode: AC/ CMV (Assist Control/ Continuous Mandatory Ventilation)  RR (machine): 20  TV (machine): 400  FiO2: 35  PEEP: 5  ITime: 1  MAP: 10  PIP: 28      Input and Output:  I&O's Detail    15 David 2023 07:01  -  16 Jan 2023 07:00  --------------------------------------------------------  IN:    DOBUTamine: 378.2 mL    Enteral Tube Flush: 750 mL    Glucerna 1.5: 40 mL    Nepro with Carb Steady: 960 mL    Propofol: 239.7 mL  Total IN: 2367.9 mL    OUT:    Chest Tube (mL): 0 mL    Chest Tube (mL): 0 mL    Indwelling Catheter - Urethral (mL): 1700 mL  Total OUT: 1700 mL    Total NET: 667.9 mL      16 Jan 2023 07:01  -  17 Jan 2023 06:28  --------------------------------------------------------  IN:    DOBUTamine: 73.4 mL    DOBUTamine: 12.2 mL    DOBUTamine: 15.3 mL    Enteral Tube Flush: 270 mL    IV PiggyBack: 550 mL    Nepro with Carb Steady: 480 mL    Propofol: 88.2 mL  Total IN: 1489.1 mL    OUT:    Chest Tube (mL): 0 mL    Chest Tube (mL): 0 mL    Indwelling Catheter - Urethral (mL): 650 mL  Total OUT: 650 mL    Total NET: 839.1 mL          Lab Data                        10.3   9.83  )-----------( 264      ( 15 David 2023 12:28 )             32.8     01-16    141  |  106  |  129<H>  ----------------------------<  108<H>  4.0   |  22  |  3.88<H>    Ca    8.8      16 Jan 2023 06:00  Phos  5.5     01-16  Mg     3.6     01-16    TPro  8.1  /  Alb  1.9<L>  /  TBili  0.7  /  DBili  x   /  AST  63<H>  /  ALT  36  /  AlkPhos  273<H>  01-16            Review of Systems	      Objective     Physical Examination    heart s1s2  lung dec BS      Pertinent Lab findings & Imaging      Leo:  NO   Adequate UO     I&O's Detail    15 David 2023 07:01  -  16 Jan 2023 07:00  --------------------------------------------------------  IN:    DOBUTamine: 378.2 mL    Enteral Tube Flush: 750 mL    Glucerna 1.5: 40 mL    Nepro with Carb Steady: 960 mL    Propofol: 239.7 mL  Total IN: 2367.9 mL    OUT:    Chest Tube (mL): 0 mL    Chest Tube (mL): 0 mL    Indwelling Catheter - Urethral (mL): 1700 mL  Total OUT: 1700 mL    Total NET: 667.9 mL      16 Jan 2023 07:01  -  17 Jan 2023 06:28  --------------------------------------------------------  IN:    DOBUTamine: 73.4 mL    DOBUTamine: 12.2 mL    DOBUTamine: 15.3 mL    Enteral Tube Flush: 270 mL    IV PiggyBack: 550 mL    Nepro with Carb Steady: 480 mL    Propofol: 88.2 mL  Total IN: 1489.1 mL    OUT:    Chest Tube (mL): 0 mL    Chest Tube (mL): 0 mL    Indwelling Catheter - Urethral (mL): 650 mL  Total OUT: 650 mL    Total NET: 839.1 mL               Discussed with:     Cultures:	        Radiology

## 2023-01-17 NOTE — PROGRESS NOTE ADULT - ASSESSMENT
The patient is a 62 year old male with a history of CAD, chronic systolic heart failure s/p ICD, atrial flutter s/p DCCV, substance abuse, CKD who is admitted with respiratory failure in the setting of tension pneumothorax complicated by cardiac arrest.    Plan:  - ECG with sinus tachycardia and known LBBB  - Echo 2/22 with severely reduced LV (EF 10%) and RV function, mod MR, mod TR, mild pulm HTN  - Not on beta blocker as the patient is on dobutamine. Not a candidate for ACEI/ARB/ARNI at the current time due to renal function.  - Poor renal function - hold apixaban  - Hold bumetanide and spironolactone due to DERRICK  - Continue amiodarone 200 mg daily  - Continue aspirin 81 mg daily  - Continue dobutamine 2.5 mcg/kg/min  - Add midodrine 10 mg tid  - Mechanical ventilation  - ICU care  - Overall poor prognosis. Comfort care would be most appropriate. GOC remain undecided at this time.

## 2023-01-17 NOTE — PROGRESS NOTE ADULT - ASSESSMENT
62M CAD, Dilated cardiomyopathy, S/p AICD, Afib/flutter, h/o substance abuse,  presented with acute hypoxic and hypercarbic respiratory failure associated with pneumothorax.      ptx  resp failure  arnoldo hx  CM  AICD  AF    fever  abx  pan cx  ID follow up  monitor VS and HD    multidisciplinary meeting held on 1/12 -   decisions to be made in the near future for discussion and GOC and care planning

## 2023-01-17 NOTE — PROGRESS NOTE ADULT - TIME BILLING
direct patient care including but not limited to reviewing chart, medications ,laboratory data, imaging reports, discussion of plan of care with consultants on the case, coordination of care with multidisciplinary team involved in the case.
Time spent on total encounter assessing patient, examination, chart review, counseling and coordinating care by the attending physician/nurse/care manager as well as GOC discussion.
direct patient care including but not limited to reviewing chart, medications ,laboratory data, imaging reports, discussion of plan of care with consultants on the case, coordination of care with multidisciplinary team involved in the case.
direct patient care including but not limited to reviewing chart, medications ,laboratory data, imaging reports, discussion of plan of care with consultants on the case, coordination of care with multidisciplinary team involved in the case.
direct patient care including but not limited to reviewing chart, medications ,laboratory data, imaging reports, discussion of plan of care with consultants on the case, coordination of care with multidisciplinary team involved in the case

## 2023-01-17 NOTE — PROVIDER CONTACT NOTE (EICU) - SITUATION
[facility]ed [facility] Dr Rea with question about options in management of progressive hypotension since early am. Pt  reportedly had a fever 101.8F overnight, cultured by ICU team and restarted ABX - Cefepime. Called [facility] - Dr Rea with question about options in management of progressive hypotension since early am ( 83/66). Pt  reportedly had a fever 101.8F overnight, cultured by ICU team and restarted ABX - Cefepime.

## 2023-01-17 NOTE — PROVIDER CONTACT NOTE (CHANGE IN STATUS NOTIFICATION) - BACKGROUND
62y M adm for respiratory failure. Intubated on AC. Dobutamine 2.5mcg drip and Propofol 10 mcg. Midodrine 10mg given this morning. Next dose due at 1400 hrs.

## 2023-01-17 NOTE — PROGRESS NOTE ADULT - ASSESSMENT
REVIEW OF SYMPTOMS      Able to give (reliable) ROS  NO     PHYSICAL EXAM    HEENT Unremarkable  atraumatic   RESP Fair air entry EXP prolonged    Harsh breath sound Resp distres mild   CARDIAC S1 S2 No S3     NO JVD    ABDOMEN SOFT BS PRESENT NOT DISTENDED No hepatosplenomegaly   PEDAL EDEMA present No calf tenderness  NO rash       GENERAL DATA .   GOC.   12/23/2022 full code  ALLGY.     nka                  WT.     12/24/2022 81           BMI.       12/24/2022 26          ICU STAY. .. 12/23/2022  COVID. ..  12/23/2022 scv2 (-)   BEST PRACTICE ISSUES.    HOB ELEVATN. Yes  DVT PPLX. ..    12/23/2022 hpsc   WHITMORE PPLX. ..    12/23/2022 protonix 40   INFN PPLX. ..  12/23/2022 chlorhexidine .12%   SP SW VIVIAN.         DIET.  .. 12/26 nepro 960 ng    IV fl...  FREE WATER 1/9/2023 free water 250.3   PROCEDURES...   12/23/2022  l chest tube   12/23/2022 intubated   12/23/2022 reed       ABGS.  1/6/2023 vent 30% 739/28/65     VS/ PO/IO/ VENT/ DRIPS.  1/17/2023 70 59/45   1/17/2023 Dubu  1/17/2023 Nore   1/17/2023 Prop   1/17/2023 ac 20/400/5/.35     AGE doa cc.  62 m doa 12/23/2022 resp distress    PREV ADMISSION 2/11-2/25/2022 [facility]    PMH  PMH COPD  PMH COVID (+) 2/11/2022   PMH S aureus bacteremia mssa 2/11   .. Ancef 2/12/2022 Dr Phan  PMH AICD  PMH HFREF  .. ECHO 11/20/2021 ef 20%  PMH A fib (on eliquis)     PROBLEMS ASSESSMENT RECOMMENDATIONS.  VENT   .. bundle dsv dsbt ltvv pplat 30 (-) PO2 60 (+) ph 7.3 (+)  .. lung protective ventilatn   .. wean as told if fails then trach  PROLONGED INTUBATN.  .. Plan trach vs GOC determination   WEANING  .. If tolerates cpap will plan extrubation   SECRETIONS.  .. 1/14/2023 glycopyrrolate 0.2 4/dp   BRONCHOSPASM.  .. 12/25 albuterol hfa  .. 12/25 atrovent hfa   SEDATION.  .. dexm 12/30-1/6  .. prop 1/6 -->   .. target rass 0 to (-) 1   HEMOPTYSIS   .. 1/3 given ddavp 25  .. 5 d meropenem started 1/3   .. 1/5/2023 no further hemoptysis   INFECTION.  .. w 1/6-1/9-1/12-1/13-1/14-1/15/2023       w 19  - 16- 11.8- 11.4- 11  .. 1/6/2023 Has leukocytosis - 9.8   .. 1/17/2023 cxr some improvment in basal lung finds   MICRO  .. mrsa 12/23 (+)   .. 12/24 et pseudo  .. 12/23  klebsiella   ANTIBIO.  .. 12/23-1/1 zosyn completed  .. 1/3-1/10 meroipenem  .. 1/16 cefepime   PLAN  .. 1/3 meroipenem started as hemoptysis leukocytosi   .. 1/13/2023 off abio   .. 1/16 cefepime started ccpa   SHOCK 1/17/2023   .. 1/17/2023 midodrine 10.3   .. 1/17/2023 norepi  A fib chronic   .. 12/30 amiodarone 200   DERRICK   .. Cr 1/2-1/9-1/12-1/13-1/14-1/15-1/17/2023       Cr 5.1 - 3.4- 4.3- 4.8- 4.6 - 4.1- 4.4     .. HD if decided by renal   .. Creat improving   CHEST TUBE   .. 2nd chest tube placed on 1/3 because of worsening pntx   .. to be removed after trach or extubation which ever comes first   CHF.  .. ECHO 11/20/2021 ef 20%  .. On dobu started 1/3-->   ANEMIA.  .. 1/13-1/15/2023 Hb 9.7- 10.3    NEURO  .. 1/10/2023 Not much progress Opens eyes some movement in lue   CAC  .. 1/17/2023 Pt coded and despite resuscitative efforts inclusing epi injectns succumbed   PROGNOSIS   .. Seems medically futile   OVERALL.  .. GOC determination   .. 1/12/2023 4:39 PM Multidisciplinary meeting was held  .. 1/17/2023 gemma Palliative care team     TIME SPENT   Over 39 minutes aggregate critical care time spent on encounter; activities included   direct patient care, counseling and/or coordinating care reviewing notes, lab data/ imaging , discussion with multidisciplinary team/ patient  /family and explaining in detail risks, benefits, alternatives  of the recommendations     BIPIN DE LA CRUZ 61 m 12/23/2022 1962 DR KELVIN VANESSA

## 2023-01-17 NOTE — PROGRESS NOTE ADULT - ASSESSMENT
63 y/o M CAD, Dilated cardiomyopathy EF <20%, S/p AICD, Afib/flutter, h/o polysubstance abuse,  presented with acute hypoxic and hypercarbic respiratory failure associated with tension pneumothorax which led to PEA cardiac arrest, s/p chest tube placement in ER.       1/17 spike 101F overnight, and 103F this AM, cultures were obtained and started cefepime by ICU PA, awaiting ID follow-up   --obtain RVP       Acute respiratory failure secondary to tension pneumothorax with leading to cardiac arrest   - continue vent management;  high AC on MV and multiple failed SBT trials.   - continue chest tubes to suction for now.   - duoneb inhalation   - serial xrays  no escalation of care, continue supporitve care     Aspiration pneumonia /Sputum Cx pseudomonas    completed zosyn x10 days      hypernatremia  - resolved    Cardiomyopathy, Acute on chronic systolic CHF ; EF <20%   - hold eliquis   - on dobutamine drip per cardiology  - amiodarone    DM2  A1c 6.4%   - FS monitoring with lispro coverage scale      DERRICK on CKD   - Likely ATN/ischemic   - monitor creatinine: improved but now increasing again.   - avoid nephrotoxic agents  - per renal : dialysis would likely cause harm, not provide for any meaningful recovery.     Prophylactic measure  - DVT proph: heparin SQ   fall, aspiration precautions, HOBE     Ethics consult appreciated.  Patient is in multiorgan system failure with no overall improvement in prognosis.  no further escalation in care would be appropriate. Prognosis extremely poor.

## 2023-01-17 NOTE — PROVIDER CONTACT NOTE (EICU) - DATE AND TIME:
03-Jan-2023 00:26
17-Jan-2023 12:00
05-Jan-2023 18:25
06-Jan-2023 18:50
03-Jan-2023
03-Jan-2023 15:51

## 2023-01-17 NOTE — PROGRESS NOTE ADULT - ASSESSMENT
continue no escalation of care  appropriate for comfort care at this point given imminent death This patient sustained anoxic encephalopathy and progressive DERRICK following arrest. He has poor mental status with no chance of meaningful recovery. Escalation of care including hemodialysis or CRRT , tracheostomy and PEG would be unlikely to change his overall poor prognosis. This case meets criteria for Do Not Escalate in Treatment as suggested by Zoe & Brant (2015). Interventions which cause distress, limit privacy, and impose burdens do not cobform to the principle of non-malfisence should be closely reviewed by a risk-benefit analysis, Recommend to continue no escalation of care. Pt is appropriate for comfort care at this point given imminent death.    Appreciate consult. Discussed with the primary team, ICU, pulm, palliative care, and system ethics.    Marc Claros MD, [facility]

## 2023-01-19 LAB
-  AMIKACIN: SIGNIFICANT CHANGE UP
-  AZTREONAM: SIGNIFICANT CHANGE UP
-  CEFEPIME: SIGNIFICANT CHANGE UP
-  CEFTAZIDIME/AVIBACTAM: SIGNIFICANT CHANGE UP
-  CEFTAZIDIME: SIGNIFICANT CHANGE UP
-  CEFTOLOZANE/TAZOBACTAM: SIGNIFICANT CHANGE UP
-  CIPROFLOXACIN: SIGNIFICANT CHANGE UP
-  GENTAMICIN: SIGNIFICANT CHANGE UP
-  IMIPENEM: SIGNIFICANT CHANGE UP
-  LEVOFLOXACIN: SIGNIFICANT CHANGE UP
-  MEROPENEM: SIGNIFICANT CHANGE UP
-  PIPERACILLIN/TAZOBACTAM: SIGNIFICANT CHANGE UP
-  TOBRAMYCIN: SIGNIFICANT CHANGE UP
BLANDM BLD POS QL PROBE: SIGNIFICANT CHANGE UP
CULTURE RESULTS: SIGNIFICANT CHANGE UP
METHOD TYPE: SIGNIFICANT CHANGE UP
METHOD TYPE: SIGNIFICANT CHANGE UP
ORGANISM # SPEC MICROSCOPIC CNT: SIGNIFICANT CHANGE UP
SPECIMEN SOURCE: SIGNIFICANT CHANGE UP

## 2023-01-22 LAB
CULTURE RESULTS: SIGNIFICANT CHANGE UP
CULTURE RESULTS: SIGNIFICANT CHANGE UP
SPECIMEN SOURCE: SIGNIFICANT CHANGE UP
SPECIMEN SOURCE: SIGNIFICANT CHANGE UP

## 2023-03-23 NOTE — PROVIDER CONTACT NOTE (OTHER) - REASON
ATTEMPT TO CALL DR DIAZ TO CANCEL CONSULT. MAILBOX FULL. COULD NOT REACH HIM
vtach on cardiac monitor
consult
consult
notified pcp
[email or web address]
[Initial Visit ___] : [unfilled] is here today for an initial visit  for [unfilled]

## 2023-04-05 NOTE — ED ADULT NURSE NOTE - OBJECTIVE STATEMENT
How Severe Are Your Spot(S)?: mild What Is The Reason For Today's Visit?: Full Body Skin Examination What Is The Reason For Today's Visit? (Being Monitored For X): the development of new lesions Pt presents to er with complaints of SOB and feeling like he is having a CHF exacerbation at this time, states he eats sodium filled sandwhiches at the shelter with LE swelling, pt declines to take gown off, safety maintained, will contiue to monitor.

## 2024-04-04 NOTE — PROVIDER CONTACT NOTE (MEDICATION) - ACTION/TREATMENT ORDERED:
MD verbalized understanding. Patient receive one time dose of midodrine subsequent dose to be managed by specialist, pending notification
Awake

## 2024-05-03 NOTE — PATIENT PROFILE ADULT - NSPROMEDSBROUGHTTOHOSP_GEN_A_NUR
Faxed signed forms to [phone]  [phone]    MSW  Effective 01/28/24  1wk1    Copied into HIMS / Filed in [place] / Gisela GOSS  
For Dr. Soto.              Reba Martino, FNP-BC   
Home Health Care      Reason for call:  [facility]  - Order #[id number] - Cert Period: 03/31/24 - 05/29/24 -     Orders are needed for this patient.  MSW Effective 04/28/24 1wk1    Pt Provider: Dr. Charles    Phone Number Homecare Nurse can be reached at: Fax       Could we send this information to you [facility] or would you prefer to receive a phone call?:   na    Put in providers in box    Gisela K    
Patient's form signed, dated, and placed in TC basket.    Willie Soto DO  5/3/2024 12:31 AM    
no

## 2024-12-27 NOTE — ED ADULT NURSE NOTE - HIV OFFER
Physical Therapy    Patient not seen in therapy.     Unavailable due to medical tests/procedures.      Pt out of room at MRI. WB status not yet updated by ortho. Again, contacted Dr. Caty Núñez at 1236 and she reports she entered order earlier today, but it does not appear in orders section. MD to follow up and re-enter, if needed. Will await order.       OBJECTIVE                          Therapy procedure time and total treatment time can be found documented on the Time Entry flowsheet   Previously Declined (within the last year)

## 2025-01-13 NOTE — ED ADULT TRIAGE NOTE - ESI TRIAGE ACUITY LEVEL, MLM
Done.   
Refill request: traMADol (ULTRAM) 50 MG tablet   Last refill: 12/2/24, #30 Refills - 0.     Last office visit/physical:  12/17/24  Last follow up/disposition: Return in about 4 months (around 4/17/2025) for as scheduled.   Appointment scheduled (FP)?  Yes - date: 4/3/25    BP Readings from Last 3 Encounters:   12/17/24 112/68   12/02/24 118/72   10/01/24 132/76     Routed to MD to advise on refill.   
2

## 2025-05-11 NOTE — ED PROVIDER NOTE - CADM POA CENTRAL LINE
Manuel Joseph  Urology  [address] [place] [id number]  Phone: [phone]  Fax: [phone]  Follow Up Time: Routine  
No